# Patient Record
Sex: MALE | Race: WHITE | NOT HISPANIC OR LATINO
[De-identification: names, ages, dates, MRNs, and addresses within clinical notes are randomized per-mention and may not be internally consistent; named-entity substitution may affect disease eponyms.]

---

## 2017-01-03 ENCOUNTER — RECORD ABSTRACTING (OUTPATIENT)
Age: 47
End: 2017-01-03

## 2017-01-03 ENCOUNTER — APPOINTMENT (OUTPATIENT)
Dept: CARDIOTHORACIC SURGERY | Facility: CLINIC | Age: 47
End: 2017-01-03

## 2017-01-03 VITALS
HEART RATE: 104 BPM | OXYGEN SATURATION: 98 % | RESPIRATION RATE: 14 BRPM | DIASTOLIC BLOOD PRESSURE: 90 MMHG | TEMPERATURE: 98.1 F | BODY MASS INDEX: 27.2 KG/M2 | HEIGHT: 70 IN | SYSTOLIC BLOOD PRESSURE: 168 MMHG | WEIGHT: 190 LBS

## 2017-01-31 ENCOUNTER — APPOINTMENT (OUTPATIENT)
Dept: CARDIOTHORACIC SURGERY | Facility: CLINIC | Age: 47
End: 2017-01-31

## 2017-02-07 ENCOUNTER — APPOINTMENT (OUTPATIENT)
Dept: CARDIOTHORACIC SURGERY | Facility: CLINIC | Age: 47
End: 2017-02-07

## 2017-02-07 VITALS — WEIGHT: 230 LBS | BODY MASS INDEX: 33 KG/M2

## 2017-02-07 VITALS — BODY MASS INDEX: 33.59 KG/M2 | WEIGHT: 234.13 LBS

## 2017-02-07 VITALS
HEIGHT: 70 IN | HEART RATE: 93 BPM | DIASTOLIC BLOOD PRESSURE: 112 MMHG | RESPIRATION RATE: 15 BRPM | TEMPERATURE: 97.5 F | OXYGEN SATURATION: 100 % | SYSTOLIC BLOOD PRESSURE: 176 MMHG

## 2017-02-07 DIAGNOSIS — Z09 ENCOUNTER FOR FOLLOW-UP EXAMINATION AFTER COMPLETED TREATMENT FOR CONDITIONS OTHER THAN MALIGNANT NEOPLASM: ICD-10-CM

## 2017-02-07 RX ORDER — FUROSEMIDE 40 MG/1
40 TABLET ORAL DAILY
Qty: 30 | Refills: 5 | Status: COMPLETED | COMMUNITY
Start: 2017-01-03 | End: 2017-02-07

## 2017-02-07 RX ORDER — LOSARTAN POTASSIUM 25 MG/1
25 TABLET, FILM COATED ORAL DAILY
Refills: 0 | Status: COMPLETED | COMMUNITY
End: 2017-02-07

## 2017-04-26 ENCOUNTER — INPATIENT (INPATIENT)
Facility: HOSPITAL | Age: 47
LOS: 6 days | Discharge: HOME | DRG: 226 | End: 2017-05-03
Attending: INTERNAL MEDICINE | Admitting: INTERNAL MEDICINE
Payer: MEDICAID

## 2017-04-26 VITALS
HEART RATE: 90 BPM | RESPIRATION RATE: 20 BRPM | SYSTOLIC BLOOD PRESSURE: 177 MMHG | OXYGEN SATURATION: 98 % | TEMPERATURE: 98 F | DIASTOLIC BLOOD PRESSURE: 109 MMHG

## 2017-04-26 DIAGNOSIS — R07.9 CHEST PAIN, UNSPECIFIED: ICD-10-CM

## 2017-04-26 DIAGNOSIS — Z89.422 ACQUIRED ABSENCE OF OTHER LEFT TOE(S): Chronic | ICD-10-CM

## 2017-04-26 DIAGNOSIS — Z95.1 PRESENCE OF AORTOCORONARY BYPASS GRAFT: Chronic | ICD-10-CM

## 2017-04-26 DIAGNOSIS — N17.9 ACUTE KIDNEY FAILURE, UNSPECIFIED: ICD-10-CM

## 2017-04-26 DIAGNOSIS — E11.9 TYPE 2 DIABETES MELLITUS WITHOUT COMPLICATIONS: ICD-10-CM

## 2017-04-26 DIAGNOSIS — I50.9 HEART FAILURE, UNSPECIFIED: ICD-10-CM

## 2017-04-26 LAB
ALBUMIN SERPL ELPH-MCNC: 3.5 G/DL — SIGNIFICANT CHANGE UP (ref 3.3–5)
ALP SERPL-CCNC: 123 U/L — HIGH (ref 40–120)
ALT FLD-CCNC: 17 U/L RC — SIGNIFICANT CHANGE UP (ref 10–45)
ANION GAP SERPL CALC-SCNC: 18 MMOL/L — HIGH (ref 5–17)
APPEARANCE UR: ABNORMAL
AST SERPL-CCNC: 17 U/L — SIGNIFICANT CHANGE UP (ref 10–40)
BASOPHILS # BLD AUTO: 0 K/UL — SIGNIFICANT CHANGE UP (ref 0–0.2)
BASOPHILS NFR BLD AUTO: 0.5 % — SIGNIFICANT CHANGE UP (ref 0–2)
BILIRUB SERPL-MCNC: 0.5 MG/DL — SIGNIFICANT CHANGE UP (ref 0.2–1.2)
BILIRUB UR-MCNC: NEGATIVE — SIGNIFICANT CHANGE UP
BUN SERPL-MCNC: 94 MG/DL — HIGH (ref 7–23)
CALCIUM SERPL-MCNC: 8.9 MG/DL — SIGNIFICANT CHANGE UP (ref 8.4–10.5)
CHLORIDE SERPL-SCNC: 103 MMOL/L — SIGNIFICANT CHANGE UP (ref 96–108)
CK MB BLD-MCNC: 6.7 % — HIGH (ref 0–3.5)
CK MB CFR SERPL CALC: 6.9 NG/ML — HIGH (ref 0–6.7)
CK SERPL-CCNC: 103 U/L — SIGNIFICANT CHANGE UP (ref 30–200)
CO2 SERPL-SCNC: 18 MMOL/L — LOW (ref 22–31)
COLOR SPEC: YELLOW — SIGNIFICANT CHANGE UP
COMMENT - URINE: SIGNIFICANT CHANGE UP
CREAT SERPL-MCNC: 4.39 MG/DL — HIGH (ref 0.5–1.3)
DIFF PNL FLD: ABNORMAL
EOSINOPHIL # BLD AUTO: 0 K/UL — SIGNIFICANT CHANGE UP (ref 0–0.5)
EOSINOPHIL NFR BLD AUTO: 0.6 % — SIGNIFICANT CHANGE UP (ref 0–6)
EPI CELLS # UR: SIGNIFICANT CHANGE UP /HPF
GLUCOSE SERPL-MCNC: 88 MG/DL — SIGNIFICANT CHANGE UP (ref 70–99)
GLUCOSE UR QL: 50
HCT VFR BLD CALC: 35.8 % — LOW (ref 39–50)
HGB BLD-MCNC: 12.1 G/DL — LOW (ref 13–17)
HYALINE CASTS # UR AUTO: ABNORMAL
INR BLD: 1.21 RATIO — HIGH (ref 0.88–1.16)
KETONES UR-MCNC: NEGATIVE — SIGNIFICANT CHANGE UP
LEUKOCYTE ESTERASE UR-ACNC: ABNORMAL
LYMPHOCYTES # BLD AUTO: 1 K/UL — SIGNIFICANT CHANGE UP (ref 1–3.3)
LYMPHOCYTES # BLD AUTO: 17.2 % — SIGNIFICANT CHANGE UP (ref 13–44)
MCHC RBC-ENTMCNC: 28.3 PG — SIGNIFICANT CHANGE UP (ref 27–34)
MCHC RBC-ENTMCNC: 33.9 GM/DL — SIGNIFICANT CHANGE UP (ref 32–36)
MCV RBC AUTO: 83.5 FL — SIGNIFICANT CHANGE UP (ref 80–100)
MONOCYTES # BLD AUTO: 0.6 K/UL — SIGNIFICANT CHANGE UP (ref 0–0.9)
MONOCYTES NFR BLD AUTO: 10.8 % — SIGNIFICANT CHANGE UP (ref 2–14)
NEUTROPHILS # BLD AUTO: 4.1 K/UL — SIGNIFICANT CHANGE UP (ref 1.8–7.4)
NEUTROPHILS NFR BLD AUTO: 70.9 % — SIGNIFICANT CHANGE UP (ref 43–77)
NITRITE UR-MCNC: NEGATIVE — SIGNIFICANT CHANGE UP
NT-PROBNP SERPL-SCNC: HIGH PG/ML (ref 0–300)
PH UR: 6 — SIGNIFICANT CHANGE UP (ref 5–8)
PLATELET # BLD AUTO: 194 K/UL — SIGNIFICANT CHANGE UP (ref 150–400)
POTASSIUM SERPL-MCNC: 4.2 MMOL/L — SIGNIFICANT CHANGE UP (ref 3.5–5.3)
POTASSIUM SERPL-SCNC: 4.2 MMOL/L — SIGNIFICANT CHANGE UP (ref 3.5–5.3)
PROT SERPL-MCNC: 7.3 G/DL — SIGNIFICANT CHANGE UP (ref 6–8.3)
PROT UR-MCNC: 500 MG/DL
PROTHROM AB SERPL-ACNC: 13.2 SEC — HIGH (ref 9.8–12.7)
RBC # BLD: 4.29 M/UL — SIGNIFICANT CHANGE UP (ref 4.2–5.8)
RBC # FLD: 15.7 % — HIGH (ref 10.3–14.5)
RBC CASTS # UR COMP ASSIST: ABNORMAL /HPF (ref 0–2)
SODIUM SERPL-SCNC: 139 MMOL/L — SIGNIFICANT CHANGE UP (ref 135–145)
SP GR SPEC: 1.02 — SIGNIFICANT CHANGE UP (ref 1.01–1.02)
TROPONIN T SERPL-MCNC: 0.23 NG/ML — HIGH (ref 0–0.06)
TROPONIN T SERPL-MCNC: 0.25 NG/ML — HIGH (ref 0–0.06)
TROPONIN T SERPL-MCNC: 0.25 NG/ML — HIGH (ref 0–0.06)
UROBILINOGEN FLD QL: NEGATIVE — SIGNIFICANT CHANGE UP
WBC # BLD: 5.7 K/UL — SIGNIFICANT CHANGE UP (ref 3.8–10.5)
WBC # FLD AUTO: 5.7 K/UL — SIGNIFICANT CHANGE UP (ref 3.8–10.5)
WBC UR QL: >50 /HPF (ref 0–5)

## 2017-04-26 PROCEDURE — 99285 EMERGENCY DEPT VISIT HI MDM: CPT | Mod: 25

## 2017-04-26 PROCEDURE — 71020: CPT | Mod: 26

## 2017-04-26 PROCEDURE — 99223 1ST HOSP IP/OBS HIGH 75: CPT

## 2017-04-26 PROCEDURE — 93306 TTE W/DOPPLER COMPLETE: CPT | Mod: 26

## 2017-04-26 PROCEDURE — 93010 ELECTROCARDIOGRAM REPORT: CPT

## 2017-04-26 PROCEDURE — 99223 1ST HOSP IP/OBS HIGH 75: CPT | Mod: GC

## 2017-04-26 RX ORDER — SODIUM CHLORIDE 9 MG/ML
500 INJECTION INTRAMUSCULAR; INTRAVENOUS; SUBCUTANEOUS ONCE
Qty: 0 | Refills: 0 | Status: COMPLETED | OUTPATIENT
Start: 2017-04-26 | End: 2017-04-26

## 2017-04-26 RX ORDER — CARVEDILOL PHOSPHATE 80 MG/1
3.12 CAPSULE, EXTENDED RELEASE ORAL EVERY 12 HOURS
Qty: 0 | Refills: 0 | Status: DISCONTINUED | OUTPATIENT
Start: 2017-04-26 | End: 2017-05-03

## 2017-04-26 RX ORDER — DEXTROSE 50 % IN WATER 50 %
12.5 SYRINGE (ML) INTRAVENOUS ONCE
Qty: 0 | Refills: 0 | Status: DISCONTINUED | OUTPATIENT
Start: 2017-04-26 | End: 2017-05-03

## 2017-04-26 RX ORDER — SPIRONOLACTONE 25 MG/1
25 TABLET, FILM COATED ORAL DAILY
Qty: 0 | Refills: 0 | Status: DISCONTINUED | OUTPATIENT
Start: 2017-04-26 | End: 2017-04-27

## 2017-04-26 RX ORDER — SODIUM CHLORIDE 9 MG/ML
1000 INJECTION, SOLUTION INTRAVENOUS
Qty: 0 | Refills: 0 | Status: DISCONTINUED | OUTPATIENT
Start: 2017-04-26 | End: 2017-05-03

## 2017-04-26 RX ORDER — SODIUM CHLORIDE 9 MG/ML
3 INJECTION INTRAMUSCULAR; INTRAVENOUS; SUBCUTANEOUS ONCE
Qty: 0 | Refills: 0 | Status: COMPLETED | OUTPATIENT
Start: 2017-04-26 | End: 2017-04-26

## 2017-04-26 RX ORDER — CEFAZOLIN SODIUM 1 G
1000 VIAL (EA) INJECTION ONCE
Qty: 0 | Refills: 0 | Status: COMPLETED | OUTPATIENT
Start: 2017-04-26 | End: 2017-04-26

## 2017-04-26 RX ORDER — DEXTROSE 50 % IN WATER 50 %
25 SYRINGE (ML) INTRAVENOUS ONCE
Qty: 0 | Refills: 0 | Status: DISCONTINUED | OUTPATIENT
Start: 2017-04-26 | End: 2017-05-03

## 2017-04-26 RX ORDER — ATORVASTATIN CALCIUM 80 MG/1
40 TABLET, FILM COATED ORAL AT BEDTIME
Qty: 0 | Refills: 0 | Status: DISCONTINUED | OUTPATIENT
Start: 2017-04-26 | End: 2017-05-03

## 2017-04-26 RX ORDER — INSULIN LISPRO 100/ML
VIAL (ML) SUBCUTANEOUS AT BEDTIME
Qty: 0 | Refills: 0 | Status: DISCONTINUED | OUTPATIENT
Start: 2017-04-26 | End: 2017-04-27

## 2017-04-26 RX ORDER — CEFAZOLIN SODIUM 1 G
VIAL (EA) INJECTION
Qty: 0 | Refills: 0 | Status: DISCONTINUED | OUTPATIENT
Start: 2017-04-26 | End: 2017-04-29

## 2017-04-26 RX ORDER — ASPIRIN/CALCIUM CARB/MAGNESIUM 324 MG
81 TABLET ORAL DAILY
Qty: 0 | Refills: 0 | Status: DISCONTINUED | OUTPATIENT
Start: 2017-04-26 | End: 2017-05-03

## 2017-04-26 RX ORDER — FUROSEMIDE 40 MG
40 TABLET ORAL DAILY
Qty: 0 | Refills: 0 | Status: DISCONTINUED | OUTPATIENT
Start: 2017-04-26 | End: 2017-04-27

## 2017-04-26 RX ORDER — GLUCAGON INJECTION, SOLUTION 0.5 MG/.1ML
1 INJECTION, SOLUTION SUBCUTANEOUS ONCE
Qty: 0 | Refills: 0 | Status: DISCONTINUED | OUTPATIENT
Start: 2017-04-26 | End: 2017-05-03

## 2017-04-26 RX ORDER — HEPARIN SODIUM 5000 [USP'U]/ML
5000 INJECTION INTRAVENOUS; SUBCUTANEOUS EVERY 12 HOURS
Qty: 0 | Refills: 0 | Status: DISCONTINUED | OUTPATIENT
Start: 2017-04-26 | End: 2017-05-03

## 2017-04-26 RX ORDER — CEFAZOLIN SODIUM 1 G
1000 VIAL (EA) INJECTION EVERY 12 HOURS
Qty: 0 | Refills: 0 | Status: DISCONTINUED | OUTPATIENT
Start: 2017-04-27 | End: 2017-04-29

## 2017-04-26 RX ORDER — INSULIN LISPRO 100/ML
VIAL (ML) SUBCUTANEOUS
Qty: 0 | Refills: 0 | Status: DISCONTINUED | OUTPATIENT
Start: 2017-04-26 | End: 2017-04-27

## 2017-04-26 RX ORDER — DEXTROSE 50 % IN WATER 50 %
1 SYRINGE (ML) INTRAVENOUS ONCE
Qty: 0 | Refills: 0 | Status: DISCONTINUED | OUTPATIENT
Start: 2017-04-26 | End: 2017-05-03

## 2017-04-26 RX ADMIN — Medication 40 MILLIGRAM(S): at 21:30

## 2017-04-26 RX ADMIN — SODIUM CHLORIDE 500 MILLILITER(S): 9 INJECTION INTRAMUSCULAR; INTRAVENOUS; SUBCUTANEOUS at 14:16

## 2017-04-26 RX ADMIN — ATORVASTATIN CALCIUM 40 MILLIGRAM(S): 80 TABLET, FILM COATED ORAL at 21:31

## 2017-04-26 RX ADMIN — SODIUM CHLORIDE 3 MILLILITER(S): 9 INJECTION INTRAMUSCULAR; INTRAVENOUS; SUBCUTANEOUS at 12:35

## 2017-04-26 RX ADMIN — CARVEDILOL PHOSPHATE 3.12 MILLIGRAM(S): 80 CAPSULE, EXTENDED RELEASE ORAL at 19:04

## 2017-04-26 RX ADMIN — Medication 100 MILLIGRAM(S): at 18:46

## 2017-04-26 RX ADMIN — Medication 81 MILLIGRAM(S): at 14:15

## 2017-04-26 NOTE — ED ADULT NURSE REASSESSMENT NOTE - NS ED NURSE REASSESS COMMENT FT1
Recvd pt awake, alert and responsive to all stimuli.  no sob or respiratory distress noted at this time.  pt denies any pain at this time and is resting comfortably in bed with cardiac monitor in place in NSR.  pt was hypertensive with kh=932/118; coreg po 3.125mg administered, as per md's orders.  recheck ft=273/106.  Will continue to monitor.

## 2017-04-26 NOTE — ED PROVIDER NOTE - SKIN, MLM
Skin normal color for race, warm, dry and intact. No evidence of rash. Sternotomy scar. Open wound to the right leg.

## 2017-04-26 NOTE — ED PROVIDER NOTE - NS ED MD SCRIBE ATTENDING SCRIBE SECTIONS
PAST MEDICAL/SURGICAL/SOCIAL HISTORY/HISTORY OF PRESENT ILLNESS/PHYSICAL EXAM/PROGRESS NOTE/REVIEW OF SYSTEMS/DISPOSITION/HIV

## 2017-04-26 NOTE — H&P ADULT. - HISTORY OF PRESENT ILLNESS
46 year old male with PMHx Diabetes Type II with foot ulcers and CAD and presents to ED c/o chest pain onset yesterday. Had an EKG done today. Denies fever or abdominal pain.

## 2017-04-26 NOTE — ED PROVIDER NOTE - OBJECTIVE STATEMENT
46 year old male with PMHx Diabetes Type II with foot ulcers and CAD and presents to ED c/o chest pain onset yesterday. Had an EKG done today. Denies fever or abdominal pain.      Cardiologist- Dr. Mckeon   PCP- Dr. Alexandr Olson

## 2017-04-26 NOTE — ED ADULT NURSE NOTE - OBJECTIVE STATEMENT
46y male arrived to ED (ambulatory) complaining of chest pain and SOB. Per patient, pain began yesterday and is located in the mid-sternal region, patient went to cardiologist today and was sent to the ED for further evaluation. Patient Hx quadruple bypass (Nov 2016), HTN, DM2. Patient lower extremities appear to have weeping edema - swollen, red (began Thanksgiving 2016), wound on the right shin for 2 weeks, sensory on all extremities intact. Patient denies nausea, vomiting, and abdominal pain. 46y male arrived to ED (ambulatory) complaining of chest pain and SOB while at rest and exertion. Per patient, pain began yesterday and is located in the mid-sternal region- nonradiating, patient went to cardiologist today and was sent to the ED for further evaluation. Patient Hx "quadruple bypass" (Nov 2016), HTN, DM2. Patient lower extremities appear to have weeping edema with pitting - swollen, red (began Thanksgiving 2016), wound/ulcer with blisters on the right shin for 2 weeks, sensory on all extremities intact. Patient denies nausea, vomiting, and abdominal pain.

## 2017-04-26 NOTE — ED PROVIDER NOTE - PROGRESS NOTE DETAILS
Contacted PCP and cardiologist, admissions to [facility] Contacted Dr. Weiner for nephrology Edinson Pablo MD (attending)

## 2017-04-26 NOTE — ED PROVIDER NOTE - DETAILS:
intermittent chest pain at rest, referred to emergency department by cardiology for admission, on my exam:  clear lungs, soft abdomen, edema of legs

## 2017-04-26 NOTE — ED ADULT NURSE NOTE - CHPI ED SYMPTOMS NEG
no vomiting/no fever/no nausea/no back pain/no syncope/no cough/no dizziness/no chills/no diaphoresis

## 2017-04-27 LAB
ANION GAP SERPL CALC-SCNC: 16 MMOL/L — SIGNIFICANT CHANGE UP (ref 5–17)
BUN SERPL-MCNC: 92 MG/DL — HIGH (ref 7–23)
CALCIUM SERPL-MCNC: 8.7 MG/DL — SIGNIFICANT CHANGE UP (ref 8.4–10.5)
CHLORIDE SERPL-SCNC: 105 MMOL/L — SIGNIFICANT CHANGE UP (ref 96–108)
CO2 SERPL-SCNC: 17 MMOL/L — LOW (ref 22–31)
CREAT SERPL-MCNC: 4.32 MG/DL — HIGH (ref 0.5–1.3)
GLUCOSE SERPL-MCNC: 62 MG/DL — LOW (ref 70–99)
HBA1C BLD-MCNC: 6.4 % — HIGH (ref 4–5.6)
HCT VFR BLD CALC: 30.4 % — LOW (ref 39–50)
HGB BLD-MCNC: 10.3 G/DL — LOW (ref 13–17)
MAGNESIUM SERPL-MCNC: 2.1 MG/DL — SIGNIFICANT CHANGE UP (ref 1.6–2.6)
MCHC RBC-ENTMCNC: 27.3 PG — SIGNIFICANT CHANGE UP (ref 27–34)
MCHC RBC-ENTMCNC: 33.9 GM/DL — SIGNIFICANT CHANGE UP (ref 32–36)
MCV RBC AUTO: 80.6 FL — SIGNIFICANT CHANGE UP (ref 80–100)
PHOSPHATE SERPL-MCNC: 4.6 MG/DL — HIGH (ref 2.5–4.5)
PLATELET # BLD AUTO: 184 K/UL — SIGNIFICANT CHANGE UP (ref 150–400)
POTASSIUM SERPL-MCNC: 4 MMOL/L — SIGNIFICANT CHANGE UP (ref 3.5–5.3)
POTASSIUM SERPL-SCNC: 4 MMOL/L — SIGNIFICANT CHANGE UP (ref 3.5–5.3)
RBC # BLD: 3.77 M/UL — LOW (ref 4.2–5.8)
RBC # FLD: 16.4 % — HIGH (ref 10.3–14.5)
SODIUM SERPL-SCNC: 138 MMOL/L — SIGNIFICANT CHANGE UP (ref 135–145)
TROPONIN T SERPL-MCNC: 0.24 NG/ML — HIGH (ref 0–0.06)
WBC # BLD: 4.34 K/UL — SIGNIFICANT CHANGE UP (ref 3.8–10.5)
WBC # FLD AUTO: 4.34 K/UL — SIGNIFICANT CHANGE UP (ref 3.8–10.5)

## 2017-04-27 PROCEDURE — 99232 SBSQ HOSP IP/OBS MODERATE 35: CPT

## 2017-04-27 PROCEDURE — 99233 SBSQ HOSP IP/OBS HIGH 50: CPT | Mod: GC

## 2017-04-27 RX ORDER — INSULIN LISPRO 100/ML
VIAL (ML) SUBCUTANEOUS
Qty: 0 | Refills: 0 | Status: DISCONTINUED | OUTPATIENT
Start: 2017-04-27 | End: 2017-04-28

## 2017-04-27 RX ORDER — HYDRALAZINE HCL 50 MG
25 TABLET ORAL ONCE
Qty: 0 | Refills: 0 | Status: COMPLETED | OUTPATIENT
Start: 2017-04-27 | End: 2017-04-27

## 2017-04-27 RX ORDER — INSULIN LISPRO 100/ML
VIAL (ML) SUBCUTANEOUS
Qty: 0 | Refills: 0 | Status: DISCONTINUED | OUTPATIENT
Start: 2017-04-27 | End: 2017-04-27

## 2017-04-27 RX ORDER — HYDRALAZINE HCL 50 MG
25 TABLET ORAL THREE TIMES A DAY
Qty: 0 | Refills: 0 | Status: DISCONTINUED | OUTPATIENT
Start: 2017-04-27 | End: 2017-04-28

## 2017-04-27 RX ORDER — ISOSORBIDE DINITRATE 5 MG/1
10 TABLET ORAL THREE TIMES A DAY
Qty: 0 | Refills: 0 | Status: DISCONTINUED | OUTPATIENT
Start: 2017-04-27 | End: 2017-05-03

## 2017-04-27 RX ORDER — FUROSEMIDE 40 MG
80 TABLET ORAL EVERY 12 HOURS
Qty: 0 | Refills: 0 | Status: DISCONTINUED | OUTPATIENT
Start: 2017-04-27 | End: 2017-05-01

## 2017-04-27 RX ORDER — INSULIN LISPRO 100/ML
VIAL (ML) SUBCUTANEOUS AT BEDTIME
Qty: 0 | Refills: 0 | Status: DISCONTINUED | OUTPATIENT
Start: 2017-04-27 | End: 2017-04-28

## 2017-04-27 RX ADMIN — SPIRONOLACTONE 25 MILLIGRAM(S): 25 TABLET, FILM COATED ORAL at 05:34

## 2017-04-27 RX ADMIN — Medication 25 MILLIGRAM(S): at 00:42

## 2017-04-27 RX ADMIN — Medication 80 MILLIGRAM(S): at 18:41

## 2017-04-27 RX ADMIN — Medication 200 MILLIGRAM(S): at 18:42

## 2017-04-27 RX ADMIN — Medication 6: at 12:04

## 2017-04-27 RX ADMIN — ISOSORBIDE DINITRATE 10 MILLIGRAM(S): 5 TABLET ORAL at 22:16

## 2017-04-27 RX ADMIN — HEPARIN SODIUM 5000 UNIT(S): 5000 INJECTION INTRAVENOUS; SUBCUTANEOUS at 17:38

## 2017-04-27 RX ADMIN — Medication 25 MILLIGRAM(S): at 21:06

## 2017-04-27 RX ADMIN — Medication 100 MILLIGRAM(S): at 17:37

## 2017-04-27 RX ADMIN — Medication 81 MILLIGRAM(S): at 12:05

## 2017-04-27 RX ADMIN — CARVEDILOL PHOSPHATE 3.12 MILLIGRAM(S): 80 CAPSULE, EXTENDED RELEASE ORAL at 05:34

## 2017-04-27 RX ADMIN — ATORVASTATIN CALCIUM 40 MILLIGRAM(S): 80 TABLET, FILM COATED ORAL at 21:06

## 2017-04-27 RX ADMIN — HEPARIN SODIUM 5000 UNIT(S): 5000 INJECTION INTRAVENOUS; SUBCUTANEOUS at 05:35

## 2017-04-27 RX ADMIN — Medication 40 MILLIGRAM(S): at 05:35

## 2017-04-27 RX ADMIN — Medication 100 MILLIGRAM(S): at 05:35

## 2017-04-27 RX ADMIN — CARVEDILOL PHOSPHATE 3.12 MILLIGRAM(S): 80 CAPSULE, EXTENDED RELEASE ORAL at 17:38

## 2017-04-27 NOTE — PROVIDER CONTACT NOTE (OTHER) - ACTION/TREATMENT ORDERED:
PA made aware. Pt to assess pt at the bedside. Lasix 40mg IV push given. Repeat BP was 167/111, hydralazine 25mg given. Will continue to monitor, and reassess BP.

## 2017-04-27 NOTE — PROVIDER CONTACT NOTE (OTHER) - ACTION/TREATMENT ORDERED:
give carbs/juice repeat FS--FS after 2 portions of carbs/juice and dinner now 106, hold sliding as per parameters

## 2017-04-27 NOTE — PROVIDER CONTACT NOTE (OTHER) - ACTION/TREATMENT ORDERED:
PA made aware. Hypoglycemic protocol initiated, repeat FS will be done every 15 minutes until FS is 100 or greater

## 2017-04-28 LAB
ANION GAP SERPL CALC-SCNC: 18 MMOL/L — HIGH (ref 5–17)
BUN SERPL-MCNC: 88 MG/DL — HIGH (ref 7–23)
CALCIUM SERPL-MCNC: 8.6 MG/DL — SIGNIFICANT CHANGE UP (ref 8.4–10.5)
CHLORIDE SERPL-SCNC: 101 MMOL/L — SIGNIFICANT CHANGE UP (ref 96–108)
CO2 SERPL-SCNC: 18 MMOL/L — LOW (ref 22–31)
CREAT SERPL-MCNC: 4.56 MG/DL — HIGH (ref 0.5–1.3)
GLUCOSE SERPL-MCNC: 93 MG/DL — SIGNIFICANT CHANGE UP (ref 70–99)
HCT VFR BLD CALC: 31 % — LOW (ref 39–50)
HGB BLD-MCNC: 10.6 G/DL — LOW (ref 13–17)
MCHC RBC-ENTMCNC: 27.4 PG — SIGNIFICANT CHANGE UP (ref 27–34)
MCHC RBC-ENTMCNC: 34.2 GM/DL — SIGNIFICANT CHANGE UP (ref 32–36)
MCV RBC AUTO: 80.1 FL — SIGNIFICANT CHANGE UP (ref 80–100)
PLATELET # BLD AUTO: 193 K/UL — SIGNIFICANT CHANGE UP (ref 150–400)
POTASSIUM SERPL-MCNC: 4 MMOL/L — SIGNIFICANT CHANGE UP (ref 3.5–5.3)
POTASSIUM SERPL-SCNC: 4 MMOL/L — SIGNIFICANT CHANGE UP (ref 3.5–5.3)
RBC # BLD: 3.87 M/UL — LOW (ref 4.2–5.8)
RBC # FLD: 16.4 % — HIGH (ref 10.3–14.5)
SODIUM SERPL-SCNC: 137 MMOL/L — SIGNIFICANT CHANGE UP (ref 135–145)
WBC # BLD: 4.53 K/UL — SIGNIFICANT CHANGE UP (ref 3.8–10.5)
WBC # FLD AUTO: 4.53 K/UL — SIGNIFICANT CHANGE UP (ref 3.8–10.5)

## 2017-04-28 PROCEDURE — 99233 SBSQ HOSP IP/OBS HIGH 50: CPT | Mod: GC

## 2017-04-28 PROCEDURE — 99223 1ST HOSP IP/OBS HIGH 75: CPT

## 2017-04-28 RX ORDER — HYDRALAZINE HCL 50 MG
50 TABLET ORAL THREE TIMES A DAY
Qty: 0 | Refills: 0 | Status: DISCONTINUED | OUTPATIENT
Start: 2017-04-28 | End: 2017-04-29

## 2017-04-28 RX ORDER — AMLODIPINE BESYLATE 2.5 MG/1
5 TABLET ORAL DAILY
Qty: 0 | Refills: 0 | Status: DISCONTINUED | OUTPATIENT
Start: 2017-04-28 | End: 2017-04-30

## 2017-04-28 RX ORDER — INSULIN LISPRO 100/ML
VIAL (ML) SUBCUTANEOUS
Qty: 0 | Refills: 0 | Status: DISCONTINUED | OUTPATIENT
Start: 2017-04-28 | End: 2017-05-03

## 2017-04-28 RX ADMIN — ISOSORBIDE DINITRATE 10 MILLIGRAM(S): 5 TABLET ORAL at 21:02

## 2017-04-28 RX ADMIN — Medication 25 MILLIGRAM(S): at 14:53

## 2017-04-28 RX ADMIN — AMLODIPINE BESYLATE 5 MILLIGRAM(S): 2.5 TABLET ORAL at 12:19

## 2017-04-28 RX ADMIN — Medication 81 MILLIGRAM(S): at 12:19

## 2017-04-28 RX ADMIN — HEPARIN SODIUM 5000 UNIT(S): 5000 INJECTION INTRAVENOUS; SUBCUTANEOUS at 17:18

## 2017-04-28 RX ADMIN — Medication 25 MILLIGRAM(S): at 06:02

## 2017-04-28 RX ADMIN — HEPARIN SODIUM 5000 UNIT(S): 5000 INJECTION INTRAVENOUS; SUBCUTANEOUS at 06:05

## 2017-04-28 RX ADMIN — CARVEDILOL PHOSPHATE 3.12 MILLIGRAM(S): 80 CAPSULE, EXTENDED RELEASE ORAL at 17:18

## 2017-04-28 RX ADMIN — Medication 50 MILLIGRAM(S): at 21:02

## 2017-04-28 RX ADMIN — ISOSORBIDE DINITRATE 10 MILLIGRAM(S): 5 TABLET ORAL at 06:02

## 2017-04-28 RX ADMIN — Medication 100 MILLIGRAM(S): at 06:02

## 2017-04-28 RX ADMIN — ISOSORBIDE DINITRATE 10 MILLIGRAM(S): 5 TABLET ORAL at 14:53

## 2017-04-28 RX ADMIN — CARVEDILOL PHOSPHATE 3.12 MILLIGRAM(S): 80 CAPSULE, EXTENDED RELEASE ORAL at 06:02

## 2017-04-28 RX ADMIN — Medication 100 MILLIGRAM(S): at 17:18

## 2017-04-28 RX ADMIN — Medication 80 MILLIGRAM(S): at 17:18

## 2017-04-28 RX ADMIN — Medication 80 MILLIGRAM(S): at 06:02

## 2017-04-28 RX ADMIN — ATORVASTATIN CALCIUM 40 MILLIGRAM(S): 80 TABLET, FILM COATED ORAL at 21:01

## 2017-04-28 NOTE — ADVANCED PRACTICE NURSE CONSULT - REASON FOR CONSULT
Requested by nursing staff to assess right shin for treatment options.  Patient is a 46 year old male with PMHx Diabetes Type II with foot ulcers and CAD and presents to ED c/o chest pain onset yesterday. Had an EKG done today. Denies fever or abdominal pain.    Past Medical History:  Coronary artery disease    Diabetes mellitus  type 2  Foot ulcer due to secondary DM.    Past Surgical History:  Breast Reduction  at age 18  S/P CABG (coronary artery bypass graft)    Toe amputation status, left.  Patient is currently on 4 Dale.

## 2017-04-28 NOTE — ADVANCED PRACTICE NURSE CONSULT - ASSESSMENT
Patient is on a [employer] P 500 low airloss surface and is able to turn and position.    Patient has been seen by Infectious Disease and Cardiology .  As per ID notes wounds appear to be improving with current treatment of antibiotics.  On assessment patient has four areas of ruptured bullae that have developed a crusting over the wound bed.  One wound has a small open wound bed that appears to be clean and viable.  No odor is noted with any of the wounds.  Will recommend covering wounds with Adaptic to provide a nonadherent interface between the wounds and the gauze dressing.  Secured with a julius wrap.  Small amount of plaque areas noted on lower right and left legs.  Discussed gentle exfoliation at home each day with patient to keep bacteria levels down.

## 2017-04-28 NOTE — ADVANCED PRACTICE NURSE CONSULT - RECOMMEDATIONS
1.  Adaptic non adherent silicone sheet to blisters on right shin and cover with ABD pads and julius wrap.  May change daily or every other day and prn  Discussed with patient gentle exfoliation each day remove plaques that are forming on his lower extremities.    Remain available as requested by staff

## 2017-04-29 LAB
HCT VFR BLD CALC: 32.1 % — LOW (ref 39–50)
HGB BLD-MCNC: 10.8 G/DL — LOW (ref 13–17)
MCHC RBC-ENTMCNC: 26.9 PG — LOW (ref 27–34)
MCHC RBC-ENTMCNC: 33.6 GM/DL — SIGNIFICANT CHANGE UP (ref 32–36)
MCV RBC AUTO: 79.9 FL — LOW (ref 80–100)
PLATELET # BLD AUTO: 210 K/UL — SIGNIFICANT CHANGE UP (ref 150–400)
RBC # BLD: 4.02 M/UL — LOW (ref 4.2–5.8)
RBC # FLD: 16.1 % — HIGH (ref 10.3–14.5)
WBC # BLD: 5.26 K/UL — SIGNIFICANT CHANGE UP (ref 3.8–10.5)
WBC # FLD AUTO: 5.26 K/UL — SIGNIFICANT CHANGE UP (ref 3.8–10.5)

## 2017-04-29 PROCEDURE — 99233 SBSQ HOSP IP/OBS HIGH 50: CPT | Mod: GC

## 2017-04-29 RX ORDER — INSULIN LISPRO 100/ML
VIAL (ML) SUBCUTANEOUS AT BEDTIME
Qty: 0 | Refills: 0 | Status: DISCONTINUED | OUTPATIENT
Start: 2017-04-29 | End: 2017-05-03

## 2017-04-29 RX ORDER — HYDRALAZINE HCL 50 MG
75 TABLET ORAL THREE TIMES A DAY
Qty: 0 | Refills: 0 | Status: DISCONTINUED | OUTPATIENT
Start: 2017-04-29 | End: 2017-05-03

## 2017-04-29 RX ORDER — CEPHALEXIN 500 MG
250 CAPSULE ORAL EVERY 12 HOURS
Qty: 0 | Refills: 0 | Status: DISCONTINUED | OUTPATIENT
Start: 2017-04-29 | End: 2017-05-03

## 2017-04-29 RX ADMIN — ISOSORBIDE DINITRATE 10 MILLIGRAM(S): 5 TABLET ORAL at 22:33

## 2017-04-29 RX ADMIN — Medication 50 MILLIGRAM(S): at 05:01

## 2017-04-29 RX ADMIN — Medication 80 MILLIGRAM(S): at 18:50

## 2017-04-29 RX ADMIN — Medication 80 MILLIGRAM(S): at 05:00

## 2017-04-29 RX ADMIN — ATORVASTATIN CALCIUM 40 MILLIGRAM(S): 80 TABLET, FILM COATED ORAL at 22:33

## 2017-04-29 RX ADMIN — ISOSORBIDE DINITRATE 10 MILLIGRAM(S): 5 TABLET ORAL at 14:41

## 2017-04-29 RX ADMIN — HEPARIN SODIUM 5000 UNIT(S): 5000 INJECTION INTRAVENOUS; SUBCUTANEOUS at 18:50

## 2017-04-29 RX ADMIN — Medication 100 MILLIGRAM(S): at 05:09

## 2017-04-29 RX ADMIN — CARVEDILOL PHOSPHATE 3.12 MILLIGRAM(S): 80 CAPSULE, EXTENDED RELEASE ORAL at 05:01

## 2017-04-29 RX ADMIN — ISOSORBIDE DINITRATE 10 MILLIGRAM(S): 5 TABLET ORAL at 05:01

## 2017-04-29 RX ADMIN — AMLODIPINE BESYLATE 5 MILLIGRAM(S): 2.5 TABLET ORAL at 05:09

## 2017-04-29 RX ADMIN — Medication 81 MILLIGRAM(S): at 12:13

## 2017-04-29 RX ADMIN — Medication 1: at 12:13

## 2017-04-29 RX ADMIN — CARVEDILOL PHOSPHATE 3.12 MILLIGRAM(S): 80 CAPSULE, EXTENDED RELEASE ORAL at 18:50

## 2017-04-29 RX ADMIN — HEPARIN SODIUM 5000 UNIT(S): 5000 INJECTION INTRAVENOUS; SUBCUTANEOUS at 05:09

## 2017-04-29 RX ADMIN — Medication 75 MILLIGRAM(S): at 22:33

## 2017-04-29 RX ADMIN — Medication 75 MILLIGRAM(S): at 14:41

## 2017-04-29 RX ADMIN — Medication 250 MILLIGRAM(S): at 18:50

## 2017-04-30 LAB
HCT VFR BLD CALC: 29.5 % — LOW (ref 39–50)
HGB BLD-MCNC: 10.1 G/DL — LOW (ref 13–17)
MCHC RBC-ENTMCNC: 27 PG — SIGNIFICANT CHANGE UP (ref 27–34)
MCHC RBC-ENTMCNC: 34.2 GM/DL — SIGNIFICANT CHANGE UP (ref 32–36)
MCV RBC AUTO: 78.9 FL — LOW (ref 80–100)
PLATELET # BLD AUTO: 200 K/UL — SIGNIFICANT CHANGE UP (ref 150–400)
RBC # BLD: 3.74 M/UL — LOW (ref 4.2–5.8)
RBC # FLD: 15.9 % — HIGH (ref 10.3–14.5)
WBC # BLD: 5.33 K/UL — SIGNIFICANT CHANGE UP (ref 3.8–10.5)
WBC # FLD AUTO: 5.33 K/UL — SIGNIFICANT CHANGE UP (ref 3.8–10.5)

## 2017-04-30 PROCEDURE — 99233 SBSQ HOSP IP/OBS HIGH 50: CPT | Mod: GC

## 2017-04-30 RX ORDER — AMLODIPINE BESYLATE 2.5 MG/1
10 TABLET ORAL DAILY
Qty: 0 | Refills: 0 | Status: DISCONTINUED | OUTPATIENT
Start: 2017-04-30 | End: 2017-05-03

## 2017-04-30 RX ADMIN — ISOSORBIDE DINITRATE 10 MILLIGRAM(S): 5 TABLET ORAL at 14:20

## 2017-04-30 RX ADMIN — CARVEDILOL PHOSPHATE 3.12 MILLIGRAM(S): 80 CAPSULE, EXTENDED RELEASE ORAL at 06:05

## 2017-04-30 RX ADMIN — ISOSORBIDE DINITRATE 10 MILLIGRAM(S): 5 TABLET ORAL at 21:18

## 2017-04-30 RX ADMIN — ISOSORBIDE DINITRATE 10 MILLIGRAM(S): 5 TABLET ORAL at 06:06

## 2017-04-30 RX ADMIN — HEPARIN SODIUM 5000 UNIT(S): 5000 INJECTION INTRAVENOUS; SUBCUTANEOUS at 17:04

## 2017-04-30 RX ADMIN — Medication 80 MILLIGRAM(S): at 06:05

## 2017-04-30 RX ADMIN — Medication 75 MILLIGRAM(S): at 21:18

## 2017-04-30 RX ADMIN — Medication 1: at 17:03

## 2017-04-30 RX ADMIN — Medication 75 MILLIGRAM(S): at 06:05

## 2017-04-30 RX ADMIN — AMLODIPINE BESYLATE 10 MILLIGRAM(S): 2.5 TABLET ORAL at 12:04

## 2017-04-30 RX ADMIN — Medication 80 MILLIGRAM(S): at 17:04

## 2017-04-30 RX ADMIN — Medication 250 MILLIGRAM(S): at 06:05

## 2017-04-30 RX ADMIN — Medication 75 MILLIGRAM(S): at 14:20

## 2017-04-30 RX ADMIN — Medication 81 MILLIGRAM(S): at 12:04

## 2017-04-30 RX ADMIN — HEPARIN SODIUM 5000 UNIT(S): 5000 INJECTION INTRAVENOUS; SUBCUTANEOUS at 06:06

## 2017-04-30 RX ADMIN — CARVEDILOL PHOSPHATE 3.12 MILLIGRAM(S): 80 CAPSULE, EXTENDED RELEASE ORAL at 17:04

## 2017-04-30 RX ADMIN — Medication 250 MILLIGRAM(S): at 17:03

## 2017-04-30 RX ADMIN — ATORVASTATIN CALCIUM 40 MILLIGRAM(S): 80 TABLET, FILM COATED ORAL at 21:18

## 2017-05-01 ENCOUNTER — OUTPATIENT (OUTPATIENT)
Dept: OUTPATIENT SERVICES | Facility: HOSPITAL | Age: 47
LOS: 1 days | End: 2017-05-01
Payer: MEDICAID

## 2017-05-01 DIAGNOSIS — Z89.422 ACQUIRED ABSENCE OF OTHER LEFT TOE(S): Chronic | ICD-10-CM

## 2017-05-01 DIAGNOSIS — Z95.1 PRESENCE OF AORTOCORONARY BYPASS GRAFT: Chronic | ICD-10-CM

## 2017-05-01 LAB
CULTURE RESULTS: SIGNIFICANT CHANGE UP
CULTURE RESULTS: SIGNIFICANT CHANGE UP
SPECIMEN SOURCE: SIGNIFICANT CHANGE UP
SPECIMEN SOURCE: SIGNIFICANT CHANGE UP

## 2017-05-01 PROCEDURE — 99232 SBSQ HOSP IP/OBS MODERATE 35: CPT

## 2017-05-01 PROCEDURE — 99233 SBSQ HOSP IP/OBS HIGH 50: CPT | Mod: GC

## 2017-05-01 RX ORDER — OXYMETAZOLINE HYDROCHLORIDE 0.5 MG/ML
1 SPRAY NASAL EVERY 12 HOURS
Qty: 0 | Refills: 0 | Status: DISCONTINUED | OUTPATIENT
Start: 2017-05-01 | End: 2017-05-01

## 2017-05-01 RX ORDER — OXYMETAZOLINE HYDROCHLORIDE 0.5 MG/ML
1 SPRAY NASAL EVERY 12 HOURS
Qty: 0 | Refills: 0 | Status: DISCONTINUED | OUTPATIENT
Start: 2017-05-01 | End: 2017-05-03

## 2017-05-01 RX ORDER — OXYMETAZOLINE HYDROCHLORIDE 0.5 MG/ML
1 SPRAY NASAL EVERY 6 HOURS
Qty: 0 | Refills: 0 | Status: DISCONTINUED | OUTPATIENT
Start: 2017-05-01 | End: 2017-05-01

## 2017-05-01 RX ADMIN — CARVEDILOL PHOSPHATE 3.12 MILLIGRAM(S): 80 CAPSULE, EXTENDED RELEASE ORAL at 18:16

## 2017-05-01 RX ADMIN — Medication 75 MILLIGRAM(S): at 13:46

## 2017-05-01 RX ADMIN — ATORVASTATIN CALCIUM 40 MILLIGRAM(S): 80 TABLET, FILM COATED ORAL at 21:33

## 2017-05-01 RX ADMIN — CARVEDILOL PHOSPHATE 3.12 MILLIGRAM(S): 80 CAPSULE, EXTENDED RELEASE ORAL at 06:01

## 2017-05-01 RX ADMIN — Medication 80 MILLIGRAM(S): at 06:01

## 2017-05-01 RX ADMIN — Medication 250 MILLIGRAM(S): at 18:16

## 2017-05-01 RX ADMIN — Medication: at 13:38

## 2017-05-01 RX ADMIN — ISOSORBIDE DINITRATE 10 MILLIGRAM(S): 5 TABLET ORAL at 06:01

## 2017-05-01 RX ADMIN — Medication 81 MILLIGRAM(S): at 13:46

## 2017-05-01 RX ADMIN — ISOSORBIDE DINITRATE 10 MILLIGRAM(S): 5 TABLET ORAL at 21:33

## 2017-05-01 RX ADMIN — HEPARIN SODIUM 5000 UNIT(S): 5000 INJECTION INTRAVENOUS; SUBCUTANEOUS at 18:16

## 2017-05-01 RX ADMIN — Medication 250 MILLIGRAM(S): at 06:01

## 2017-05-01 RX ADMIN — Medication 75 MILLIGRAM(S): at 21:33

## 2017-05-01 RX ADMIN — Medication 75 MILLIGRAM(S): at 06:01

## 2017-05-01 RX ADMIN — AMLODIPINE BESYLATE 10 MILLIGRAM(S): 2.5 TABLET ORAL at 06:01

## 2017-05-01 RX ADMIN — ISOSORBIDE DINITRATE 10 MILLIGRAM(S): 5 TABLET ORAL at 18:15

## 2017-05-02 LAB
ANION GAP SERPL CALC-SCNC: 20 MMOL/L — HIGH (ref 5–17)
BUN SERPL-MCNC: 101 MG/DL — HIGH (ref 7–23)
CALCIUM SERPL-MCNC: 8.6 MG/DL — SIGNIFICANT CHANGE UP (ref 8.4–10.5)
CHLORIDE SERPL-SCNC: 96 MMOL/L — SIGNIFICANT CHANGE UP (ref 96–108)
CHLORIDE UR-SCNC: 28 MMOL/L — SIGNIFICANT CHANGE UP
CO2 SERPL-SCNC: 20 MMOL/L — LOW (ref 22–31)
CREAT SERPL-MCNC: 5.39 MG/DL — HIGH (ref 0.5–1.3)
GLUCOSE SERPL-MCNC: 141 MG/DL — HIGH (ref 70–99)
HCT VFR BLD CALC: 30.7 % — LOW (ref 39–50)
HCT VFR BLD CALC: 34.7 % — LOW (ref 39–50)
HGB BLD-MCNC: 10.2 G/DL — LOW (ref 13–17)
HGB BLD-MCNC: 11.6 G/DL — LOW (ref 13–17)
MCHC RBC-ENTMCNC: 26.8 PG — LOW (ref 27–34)
MCHC RBC-ENTMCNC: 28 PG — SIGNIFICANT CHANGE UP (ref 27–34)
MCHC RBC-ENTMCNC: 33.2 GM/DL — SIGNIFICANT CHANGE UP (ref 32–36)
MCHC RBC-ENTMCNC: 33.3 GM/DL — SIGNIFICANT CHANGE UP (ref 32–36)
MCV RBC AUTO: 80.8 FL — SIGNIFICANT CHANGE UP (ref 80–100)
MCV RBC AUTO: 84.3 FL — SIGNIFICANT CHANGE UP (ref 80–100)
PLATELET # BLD AUTO: 177 K/UL — SIGNIFICANT CHANGE UP (ref 150–400)
PLATELET # BLD AUTO: 190 K/UL — SIGNIFICANT CHANGE UP (ref 150–400)
POTASSIUM SERPL-MCNC: 3.4 MMOL/L — LOW (ref 3.5–5.3)
POTASSIUM SERPL-SCNC: 3.4 MMOL/L — LOW (ref 3.5–5.3)
RBC # BLD: 3.8 M/UL — LOW (ref 4.2–5.8)
RBC # BLD: 4.12 M/UL — LOW (ref 4.2–5.8)
RBC # FLD: 15.6 % — HIGH (ref 10.3–14.5)
RBC # FLD: 16.4 % — HIGH (ref 10.3–14.5)
SODIUM SERPL-SCNC: 136 MMOL/L — SIGNIFICANT CHANGE UP (ref 135–145)
SODIUM UR-SCNC: 29 MMOL/L — SIGNIFICANT CHANGE UP
WBC # BLD: 7.23 K/UL — SIGNIFICANT CHANGE UP (ref 3.8–10.5)
WBC # BLD: 8.6 K/UL — SIGNIFICANT CHANGE UP (ref 3.8–10.5)
WBC # FLD AUTO: 7.23 K/UL — SIGNIFICANT CHANGE UP (ref 3.8–10.5)
WBC # FLD AUTO: 8.6 K/UL — SIGNIFICANT CHANGE UP (ref 3.8–10.5)

## 2017-05-02 PROCEDURE — 99233 SBSQ HOSP IP/OBS HIGH 50: CPT | Mod: GC

## 2017-05-02 PROCEDURE — 33249 INSJ/RPLCMT DEFIB W/LEAD(S): CPT

## 2017-05-02 PROCEDURE — 99152 MOD SED SAME PHYS/QHP 5/>YRS: CPT

## 2017-05-02 PROCEDURE — 71010: CPT | Mod: 26

## 2017-05-02 RX ORDER — ACETAMINOPHEN 500 MG
650 TABLET ORAL EVERY 6 HOURS
Qty: 0 | Refills: 0 | Status: DISCONTINUED | OUTPATIENT
Start: 2017-05-02 | End: 2017-05-03

## 2017-05-02 RX ORDER — POTASSIUM CHLORIDE 20 MEQ
20 PACKET (EA) ORAL ONCE
Qty: 0 | Refills: 0 | Status: COMPLETED | OUTPATIENT
Start: 2017-05-02 | End: 2017-05-02

## 2017-05-02 RX ORDER — VANCOMYCIN HCL 1 G
1000 VIAL (EA) INTRAVENOUS ONCE
Qty: 0 | Refills: 0 | Status: COMPLETED | OUTPATIENT
Start: 2017-05-03 | End: 2017-05-03

## 2017-05-02 RX ADMIN — ISOSORBIDE DINITRATE 10 MILLIGRAM(S): 5 TABLET ORAL at 21:50

## 2017-05-02 RX ADMIN — Medication 75 MILLIGRAM(S): at 21:50

## 2017-05-02 RX ADMIN — Medication 250 MILLIGRAM(S): at 17:09

## 2017-05-02 RX ADMIN — ATORVASTATIN CALCIUM 40 MILLIGRAM(S): 80 TABLET, FILM COATED ORAL at 21:50

## 2017-05-02 RX ADMIN — Medication 250 MILLIGRAM(S): at 05:32

## 2017-05-02 RX ADMIN — Medication 20 MILLIEQUIVALENT(S): at 09:11

## 2017-05-02 RX ADMIN — Medication 81 MILLIGRAM(S): at 17:10

## 2017-05-02 RX ADMIN — Medication 650 MILLIGRAM(S): at 23:30

## 2017-05-02 RX ADMIN — Medication 75 MILLIGRAM(S): at 05:32

## 2017-05-02 RX ADMIN — ISOSORBIDE DINITRATE 10 MILLIGRAM(S): 5 TABLET ORAL at 05:32

## 2017-05-02 RX ADMIN — AMLODIPINE BESYLATE 10 MILLIGRAM(S): 2.5 TABLET ORAL at 05:32

## 2017-05-02 RX ADMIN — CARVEDILOL PHOSPHATE 3.12 MILLIGRAM(S): 80 CAPSULE, EXTENDED RELEASE ORAL at 17:09

## 2017-05-02 RX ADMIN — CARVEDILOL PHOSPHATE 3.12 MILLIGRAM(S): 80 CAPSULE, EXTENDED RELEASE ORAL at 05:32

## 2017-05-02 RX ADMIN — Medication 1: at 17:10

## 2017-05-02 RX ADMIN — Medication 650 MILLIGRAM(S): at 22:48

## 2017-05-02 RX ADMIN — HEPARIN SODIUM 5000 UNIT(S): 5000 INJECTION INTRAVENOUS; SUBCUTANEOUS at 17:10

## 2017-05-02 NOTE — DIETITIAN INITIAL EVALUATION ADULT. - NS AS NUTRI INTERV MEALS SNACK
When medically feasible, would recommend to change diet to Consistent CHO and DASH diet, 60 gm protein diet. Trend renal indices./General/healthful diet

## 2017-05-02 NOTE — DIETITIAN INITIAL EVALUATION ADULT. - ENERGY NEEDS
Ht:5'10",  Wt: 204lbs, BMI: 29.2kg/m2, IBW: 166lbs(+/-10%), 122%IBW  Pertinent information: Pt admitted for Chest pain. Pt with known CAD, CABG history, T2MD and HF. Per Chart with Right lower extremity cellulitis, acute on chronic HF with pulmonary edema and volume overload. Plan for AICD today.   +2 callie leg Edema, Right foot ulcer.

## 2017-05-02 NOTE — DIETITIAN INITIAL EVALUATION ADULT. - ORAL INTAKE PTA
Pt reports a good PO intake PTA. Breakfast: large coffee, and sometimes eggs. Lunch and Dinner and both some variations of protein and vegetables. Pt drinks water and seltzer./good

## 2017-05-02 NOTE — DIETITIAN INITIAL EVALUATION ADULT. - NS AS NUTRI INTERV ED CONTENT
einforced T2DM and HF nutrition therapy diet guidelines. Encouraged Pt to continue monitoring BG levels and preform daily Wt's. RD encouraged Pt to continue diet compliance. RD remains available to monitor PO intake, wt, labs and diet education review./Recommended modifications/Other (specify)/Nutrition relationship to health/disease/Purpose of the nutrition education

## 2017-05-02 NOTE — DIETITIAN INITIAL EVALUATION ADULT. - NS AS NUTRI INTERV FEED ASSISTANCE
1. Provide food preferences as requested by Pt/family within diet restrictions  2. Encourage PO intake during meal times./Other (specify)

## 2017-05-02 NOTE — DIETITIAN INITIAL EVALUATION ADULT. - OTHER INFO
Pt arcelia for LOS. Pt reports a good PO intake during admission, has been consuming 100% of all meals. Pt states he monitors BG levels 2-3x daily with usual results between 80-120mg/d and takes Januvia for BG control. Current HgbA1c is 6.4%. Pt states he monitoring his Wt weekly, not daily as previously recommended to him. RD reviewed HF nutrition therapy diet and daily Wt guidelines. Pt verbalized understanding and denies need for written education materials. Pt denies micronutrient supplementation. Pt denies chewing/swallowing difficulty. Pt denies GI distress. NKFA

## 2017-05-02 NOTE — DIETITIAN INITIAL EVALUATION ADULT. - SOURCE
patient/other (specify)/family/significant other/Pt's mother at bedside, RN, medical record, Previous RD note.

## 2017-05-02 NOTE — DIETITIAN INITIAL EVALUATION ADULT. - DIET TYPE
consistent carbohydrate (no snacks)/DASH/TLC (sodium and cholesterol restricted diet)/NPO after midnight

## 2017-05-02 NOTE — PROVIDER CONTACT NOTE (OTHER) - RECOMMENDATIONS
Follow hypoglycemic protocol.
Pt woken up and assessed.
cookies provided, repeat , hold sliding humalog as per parameters continue to monitor for s/s of hypoglycemia
give carbs/juice repeat FS--FS after 2 portions of carbs/juice and dinner now 106, hold sliding as per parameters
notify np
will draw stat cbc as ordered and continue to monitor
Pt already took losartan at home. Pt received coreg in ED. Additional BP med.

## 2017-05-03 ENCOUNTER — TRANSCRIPTION ENCOUNTER (OUTPATIENT)
Age: 47
End: 2017-05-03

## 2017-05-03 VITALS — WEIGHT: 203.05 LBS

## 2017-05-03 LAB
ANION GAP SERPL CALC-SCNC: 17 MMOL/L — SIGNIFICANT CHANGE UP (ref 5–17)
BASOPHILS # BLD AUTO: 0 K/UL — SIGNIFICANT CHANGE UP (ref 0–0.2)
BASOPHILS NFR BLD AUTO: 0.4 % — SIGNIFICANT CHANGE UP (ref 0–2)
BUN SERPL-MCNC: 100 MG/DL — HIGH (ref 7–23)
CALCIUM SERPL-MCNC: 8.7 MG/DL — SIGNIFICANT CHANGE UP (ref 8.4–10.5)
CHLORIDE SERPL-SCNC: 97 MMOL/L — SIGNIFICANT CHANGE UP (ref 96–108)
CO2 SERPL-SCNC: 22 MMOL/L — SIGNIFICANT CHANGE UP (ref 22–31)
CREAT SERPL-MCNC: 5.35 MG/DL — HIGH (ref 0.5–1.3)
EOSINOPHIL # BLD AUTO: 0 K/UL — SIGNIFICANT CHANGE UP (ref 0–0.5)
EOSINOPHIL NFR BLD AUTO: 0.3 % — SIGNIFICANT CHANGE UP (ref 0–6)
GLUCOSE SERPL-MCNC: 158 MG/DL — HIGH (ref 70–99)
HCT VFR BLD CALC: 31.7 % — LOW (ref 39–50)
HCT VFR BLD CALC: 32 % — LOW (ref 39–50)
HGB BLD-MCNC: 10.5 G/DL — LOW (ref 13–17)
HGB BLD-MCNC: 10.6 G/DL — LOW (ref 13–17)
LYMPHOCYTES # BLD AUTO: 0.8 K/UL — LOW (ref 1–3.3)
LYMPHOCYTES # BLD AUTO: 9.2 % — LOW (ref 13–44)
MCHC RBC-ENTMCNC: 27.7 PG — SIGNIFICANT CHANGE UP (ref 27–34)
MCHC RBC-ENTMCNC: 27.9 PG — SIGNIFICANT CHANGE UP (ref 27–34)
MCHC RBC-ENTMCNC: 33 GM/DL — SIGNIFICANT CHANGE UP (ref 32–36)
MCHC RBC-ENTMCNC: 33.2 GM/DL — SIGNIFICANT CHANGE UP (ref 32–36)
MCV RBC AUTO: 83.9 FL — SIGNIFICANT CHANGE UP (ref 80–100)
MCV RBC AUTO: 84.1 FL — SIGNIFICANT CHANGE UP (ref 80–100)
MONOCYTES # BLD AUTO: 1 K/UL — HIGH (ref 0–0.9)
MONOCYTES NFR BLD AUTO: 11.9 % — SIGNIFICANT CHANGE UP (ref 2–14)
NEUTROPHILS # BLD AUTO: 6.5 K/UL — SIGNIFICANT CHANGE UP (ref 1.8–7.4)
NEUTROPHILS NFR BLD AUTO: 78.3 % — HIGH (ref 43–77)
PLATELET # BLD AUTO: 162 K/UL — SIGNIFICANT CHANGE UP (ref 150–400)
PLATELET # BLD AUTO: 162 K/UL — SIGNIFICANT CHANGE UP (ref 150–400)
POTASSIUM SERPL-MCNC: 3.9 MMOL/L — SIGNIFICANT CHANGE UP (ref 3.5–5.3)
POTASSIUM SERPL-SCNC: 3.9 MMOL/L — SIGNIFICANT CHANGE UP (ref 3.5–5.3)
RBC # BLD: 3.77 M/UL — LOW (ref 4.2–5.8)
RBC # BLD: 3.81 M/UL — LOW (ref 4.2–5.8)
RBC # FLD: 15.3 % — HIGH (ref 10.3–14.5)
RBC # FLD: 15.4 % — HIGH (ref 10.3–14.5)
SODIUM SERPL-SCNC: 136 MMOL/L — SIGNIFICANT CHANGE UP (ref 135–145)
VANCOMYCIN TROUGH SERPL-MCNC: 10.1 UG/ML — SIGNIFICANT CHANGE UP (ref 10–20)
WBC # BLD: 8 K/UL — SIGNIFICANT CHANGE UP (ref 3.8–10.5)
WBC # BLD: 8.2 K/UL — SIGNIFICANT CHANGE UP (ref 3.8–10.5)
WBC # FLD AUTO: 8 K/UL — SIGNIFICANT CHANGE UP (ref 3.8–10.5)
WBC # FLD AUTO: 8.2 K/UL — SIGNIFICANT CHANGE UP (ref 3.8–10.5)

## 2017-05-03 PROCEDURE — 87040 BLOOD CULTURE FOR BACTERIA: CPT

## 2017-05-03 PROCEDURE — 80202 ASSAY OF VANCOMYCIN: CPT

## 2017-05-03 PROCEDURE — 82553 CREATINE MB FRACTION: CPT

## 2017-05-03 PROCEDURE — 93005 ELECTROCARDIOGRAM TRACING: CPT

## 2017-05-03 PROCEDURE — 99233 SBSQ HOSP IP/OBS HIGH 50: CPT | Mod: GC

## 2017-05-03 PROCEDURE — 82550 ASSAY OF CK (CPK): CPT

## 2017-05-03 PROCEDURE — 84300 ASSAY OF URINE SODIUM: CPT

## 2017-05-03 PROCEDURE — 83880 ASSAY OF NATRIURETIC PEPTIDE: CPT

## 2017-05-03 PROCEDURE — 99153 MOD SED SAME PHYS/QHP EA: CPT

## 2017-05-03 PROCEDURE — 99285 EMERGENCY DEPT VISIT HI MDM: CPT | Mod: 25

## 2017-05-03 PROCEDURE — C1722: CPT

## 2017-05-03 PROCEDURE — 80053 COMPREHEN METABOLIC PANEL: CPT

## 2017-05-03 PROCEDURE — 80048 BASIC METABOLIC PNL TOTAL CA: CPT

## 2017-05-03 PROCEDURE — 84100 ASSAY OF PHOSPHORUS: CPT

## 2017-05-03 PROCEDURE — 71046 X-RAY EXAM CHEST 2 VIEWS: CPT

## 2017-05-03 PROCEDURE — 81001 URINALYSIS AUTO W/SCOPE: CPT

## 2017-05-03 PROCEDURE — 82436 ASSAY OF URINE CHLORIDE: CPT

## 2017-05-03 PROCEDURE — 33249 INSJ/RPLCMT DEFIB W/LEAD(S): CPT | Mod: Q0

## 2017-05-03 PROCEDURE — 83735 ASSAY OF MAGNESIUM: CPT

## 2017-05-03 PROCEDURE — 85610 PROTHROMBIN TIME: CPT

## 2017-05-03 PROCEDURE — C1777: CPT

## 2017-05-03 PROCEDURE — 84484 ASSAY OF TROPONIN QUANT: CPT

## 2017-05-03 PROCEDURE — 83036 HEMOGLOBIN GLYCOSYLATED A1C: CPT

## 2017-05-03 PROCEDURE — 71045 X-RAY EXAM CHEST 1 VIEW: CPT

## 2017-05-03 PROCEDURE — C8929: CPT

## 2017-05-03 PROCEDURE — 99152 MOD SED SAME PHYS/QHP 5/>YRS: CPT

## 2017-05-03 PROCEDURE — 85027 COMPLETE CBC AUTOMATED: CPT

## 2017-05-03 PROCEDURE — C1892: CPT

## 2017-05-03 PROCEDURE — 93010 ELECTROCARDIOGRAM REPORT: CPT

## 2017-05-03 PROCEDURE — 71010: CPT | Mod: 26

## 2017-05-03 RX ORDER — CEPHALEXIN 500 MG
1 CAPSULE ORAL
Qty: 5 | Refills: 0 | OUTPATIENT
Start: 2017-05-03 | End: 2017-05-06

## 2017-05-03 RX ORDER — AMLODIPINE BESYLATE 2.5 MG/1
1 TABLET ORAL
Qty: 30 | Refills: 0 | OUTPATIENT
Start: 2017-05-03 | End: 2017-06-02

## 2017-05-03 RX ORDER — HYDRALAZINE HCL 50 MG
3 TABLET ORAL
Qty: 0 | Refills: 0 | COMMUNITY
Start: 2017-05-03

## 2017-05-03 RX ORDER — HYDRALAZINE HCL 50 MG
3 TABLET ORAL
Qty: 270 | Refills: 0 | OUTPATIENT
Start: 2017-05-03 | End: 2017-06-02

## 2017-05-03 RX ORDER — ASPIRIN/CALCIUM CARB/MAGNESIUM 324 MG
1 TABLET ORAL
Qty: 0 | Refills: 0 | COMMUNITY
Start: 2017-05-03

## 2017-05-03 RX ORDER — LOSARTAN POTASSIUM 100 MG/1
1 TABLET, FILM COATED ORAL
Qty: 0 | Refills: 0 | COMMUNITY

## 2017-05-03 RX ORDER — CARVEDILOL PHOSPHATE 80 MG/1
1 CAPSULE, EXTENDED RELEASE ORAL
Qty: 60 | Refills: 0 | OUTPATIENT
Start: 2017-05-03 | End: 2017-06-02

## 2017-05-03 RX ORDER — ASPIRIN/CALCIUM CARB/MAGNESIUM 324 MG
1 TABLET ORAL
Qty: 0 | Refills: 0 | COMMUNITY

## 2017-05-03 RX ORDER — SITAGLIPTIN 50 MG/1
1 TABLET, FILM COATED ORAL
Qty: 30 | Refills: 0 | OUTPATIENT
Start: 2017-05-03 | End: 2017-06-02

## 2017-05-03 RX ORDER — ISOSORBIDE DINITRATE 5 MG/1
1 TABLET ORAL
Qty: 0 | Refills: 0 | COMMUNITY
Start: 2017-05-03

## 2017-05-03 RX ORDER — FERROUS SULFATE 325(65) MG
1 TABLET ORAL
Qty: 0 | Refills: 0 | COMMUNITY

## 2017-05-03 RX ORDER — ATORVASTATIN CALCIUM 80 MG/1
1 TABLET, FILM COATED ORAL
Qty: 30 | Refills: 0
Start: 2017-05-03 | End: 2017-06-02

## 2017-05-03 RX ORDER — ASPIRIN/CALCIUM CARB/MAGNESIUM 324 MG
1 TABLET ORAL
Qty: 30 | Refills: 0
Start: 2017-05-03 | End: 2017-06-02

## 2017-05-03 RX ORDER — CEPHALEXIN 500 MG
1 CAPSULE ORAL
Qty: 0 | Refills: 0 | COMMUNITY
Start: 2017-05-03

## 2017-05-03 RX ORDER — AMLODIPINE BESYLATE 2.5 MG/1
1 TABLET ORAL
Qty: 0 | Refills: 0 | COMMUNITY
Start: 2017-05-03

## 2017-05-03 RX ORDER — CARVEDILOL PHOSPHATE 80 MG/1
1 CAPSULE, EXTENDED RELEASE ORAL
Qty: 0 | Refills: 0 | COMMUNITY
Start: 2017-05-03

## 2017-05-03 RX ORDER — SITAGLIPTIN 50 MG/1
1 TABLET, FILM COATED ORAL
Qty: 0 | Refills: 0 | COMMUNITY

## 2017-05-03 RX ORDER — FUROSEMIDE 40 MG
1 TABLET ORAL
Qty: 0 | Refills: 0 | COMMUNITY

## 2017-05-03 RX ORDER — ISOSORBIDE DINITRATE 5 MG/1
1 TABLET ORAL
Qty: 90 | Refills: 0 | OUTPATIENT
Start: 2017-05-03 | End: 2017-06-02

## 2017-05-03 RX ORDER — ASPIRIN/CALCIUM CARB/MAGNESIUM 324 MG
1 TABLET ORAL
Qty: 0 | Refills: 0 | DISCHARGE
Start: 2017-05-03 | End: 2017-06-02

## 2017-05-03 RX ORDER — ATORVASTATIN CALCIUM 80 MG/1
1 TABLET, FILM COATED ORAL
Qty: 0 | Refills: 0 | COMMUNITY
Start: 2017-05-03

## 2017-05-03 RX ORDER — FERROUS SULFATE 325(65) MG
1 TABLET ORAL
Qty: 60 | Refills: 0 | OUTPATIENT
Start: 2017-05-03 | End: 2017-06-02

## 2017-05-03 RX ADMIN — Medication 75 MILLIGRAM(S): at 13:29

## 2017-05-03 RX ADMIN — CARVEDILOL PHOSPHATE 3.12 MILLIGRAM(S): 80 CAPSULE, EXTENDED RELEASE ORAL at 05:19

## 2017-05-03 RX ADMIN — AMLODIPINE BESYLATE 10 MILLIGRAM(S): 2.5 TABLET ORAL at 05:24

## 2017-05-03 RX ADMIN — ISOSORBIDE DINITRATE 10 MILLIGRAM(S): 5 TABLET ORAL at 13:29

## 2017-05-03 RX ADMIN — Medication 250 MILLIGRAM(S): at 17:09

## 2017-05-03 RX ADMIN — Medication 1: at 12:21

## 2017-05-03 RX ADMIN — Medication 650 MILLIGRAM(S): at 06:18

## 2017-05-03 RX ADMIN — CARVEDILOL PHOSPHATE 3.12 MILLIGRAM(S): 80 CAPSULE, EXTENDED RELEASE ORAL at 17:09

## 2017-05-03 RX ADMIN — ISOSORBIDE DINITRATE 10 MILLIGRAM(S): 5 TABLET ORAL at 05:19

## 2017-05-03 RX ADMIN — Medication 650 MILLIGRAM(S): at 17:11

## 2017-05-03 RX ADMIN — Medication 650 MILLIGRAM(S): at 05:24

## 2017-05-03 RX ADMIN — Medication 81 MILLIGRAM(S): at 12:21

## 2017-05-03 RX ADMIN — Medication 250 MILLIGRAM(S): at 13:29

## 2017-05-03 RX ADMIN — Medication 2: at 17:09

## 2017-05-03 RX ADMIN — Medication 75 MILLIGRAM(S): at 05:19

## 2017-05-03 RX ADMIN — Medication 250 MILLIGRAM(S): at 05:19

## 2017-05-03 NOTE — DISCHARGE NOTE ADULT - HOSPITAL COURSE
to be completed by attending 46 year old male with PMHx Diabetes Type II with foot Ulcers and CAD.  P/w to ED c/o chest pain onset yesterday.  Denies fever or abdominal pain.  4/26	cxray: Left pleural effusion. Left lower lobe pneumonia and/or atelectasis. The 	right lung is clear.    	-Trop 0.25 x2 likely 2/2 chf / gin  4/26 Night: FS 57 & 63 asymptomatic - after hypoglycemic protocol 108. D/W Dr. Castro pt. DMT2 - no lantus for tonight will f/u in AM  	- pt. remains hypotensive will give hydralazine 25 mg PO x 1 now as recommended by CHF team note - D/W dr. Castro   	- 4 Beats WCT - asymptomatic, electrolytes added to AM labs   4/27     Cardio: Lasix IV 80 mg BID                          Hydralazinr 25 mg TID                          Isodil 10 mg TID                          ICD pending               ID:Continue Ancef IV 4/28 and then complete 7 days po abx w keflex 500 	mg bid  4/27 Night: Endocrine paged back at 7:42pm ? consult called - advised to fix sliding scale to pt. specific as done - team will F/U with pt in AM     4/28 Continue IV Lasix for CHF  .     04/28 Night PA: Cardio: - Hydralazine 25--->50mg tid to reduce afterload.   - c/w Amlodipine ,Isordil  and Coreg 3.125mg bid.   - Monitor BP.                     04/30 Night PA: EP: NPO@[place] for possible AICD on Monday.   	- Hold SC Heparin in am.   5/1: Creat 5.32 /  - renal - likely from diuretics - hold today  	- AICD: cancelled today by EP (? fluid overload)  5/2: AICD placed today - anticipate discharge tomorrow  5/3 swelling of AICD insertion site. Seen by CCU NP        follow up with cbc and chest x-ray.    Dx--	CHF : lasix iv  	CP :  	GIN    	r/o Cellulitis LE on Ancef IV  	Elev Trop likely 2/2 chf/ gin

## 2017-05-03 NOTE — DISCHARGE NOTE ADULT - PATIENT PORTAL LINK FT
“You can access the [employer] Patient Portal, offered by [facility], by registering with the following website: [email or web address]”

## 2017-05-03 NOTE — DISCHARGE NOTE ADULT - MEDICATION SUMMARY - MEDICATIONS TO TAKE
I will START or STAY ON the medications listed below when I get home from the hospital:    aspirin 81 mg oral tablet, chewable  -- 1 tab(s) by mouth once a day  -- Indication: For Coronary artery disease    isosorbide dinitrate 10 mg oral tablet  -- 1 tab(s) by mouth 3 times a day  -- Indication: For Coronary artery disease    Januvia 100 mg oral tablet  -- 1 tab(s) by mouth once a day  -- Indication: For Diabetes mellitus    atorvastatin 40 mg oral tablet  -- 1 tab(s) by mouth once a day (at bedtime)  -- Indication: For Cholesterol management    carvedilol 3.125 mg oral tablet  -- 1 tab(s) by mouth every 12 hours  -- Indication: For Acute on chronic heart failure    amLODIPine 10 mg oral tablet  -- 1 tab(s) by mouth once a day  -- Indication: For hypertension    Keflex 250 mg oral capsule  -- 1 cap(s) by mouth every 12 hours stop after last dose on May 5th  -- Indication: For Cellulitis of extremity    ferrous sulfate 325 mg (65 mg elemental iron) oral tablet  -- 1 tab(s) by mouth 2 times a day  -- Indication: For suppliment    hydrALAZINE 25 mg oral tablet  -- 3 tab(s) by mouth 3 times a day  -- Indication: For hypertension

## 2017-05-03 NOTE — PROVIDER CONTACT NOTE (OTHER) - ASSESSMENT
Pt AOx4. No c/o pain/discomfort. BP is 184/98 manually. Pt is asymptomatic.
AAOx4. VSS exc /87. Pain 6/10. Increased swelling since AICD placed on 5/2.
Pt A+Ox4, No c/o of pain or discomfort. Pt states had just spoken to MD and had gotten excited but is much calmer now.
alert, vss, site is NOT ecchymotic but is swollen. extremity  with no numbness or tingling
pt denies any fatigue/weakness, VSS, about to eat dinner
pt denies fatigue/weakness says he just needs to eat something aside from juice, VSS
pt is aox4, is having small nose bleed not on AC
Pt AOx4. No c/o pain/discomfort. BP is elevated. Pt is asymptomatic. Pt states he only ate a piece of chicken today.
Pt AOx4. No c/o pain/discomfort. VSS. Pt is asymptomatic.

## 2017-05-03 NOTE — DISCHARGE NOTE ADULT - FINDINGS/TREATMENT
4/26/17: Transthoracic Echocardiogram: Observations:  Mitral Valve: Normal mitral valve. Moderate mitral regurgitation.  Aortic Valve/Aorta: Normal trileaflet aortic valve. Minimal aortic regurgitation. Aortic Root: 3.2 cm.  Left Atrium: Mildly dilated left atrium.  LA volume index = 39 cc/m2.  Left Ventricle: Severe global left ventricular systolic dysfunction. Endocardial visualization enhanced with  intravenous injection of echo contrast (Definity). No left ventricular thrombus. Mild concentric left ventricular  hypertrophy. Severe  diastolic dysfunction (Stage III).  Right Heart: Normal right atrium. Decreased right ventricular systolic function. Normal tricuspid valve. Mild  tricuspid regurgitation. Normal pulmonic valve. Mild pulmonic regurgitation.  Pericardium/Pleura: Normal pericardium with no pericardial effusion.  Left pleural effusion.  Hemodynamic: Estimated right atrial pressure is 8 mm Hg.  Estimated right ventricular systolic pressure equals 37 mmHg, assuming right atrial pressure equals 8 mm Hg, consistent with borderline pulmonary hypertension

## 2017-05-03 NOTE — PROVIDER CONTACT NOTE (OTHER) - NAME OF MD/NP/PA/DO NOTIFIED:
Crow Pablo NP
Arianne Dawson NP
Arianne Dawson NP
SHERIE Chris
SHERIE Lora
SHERIE Lora
lobito fonseca np
michel fonseca np
SHERIE Lora

## 2017-05-03 NOTE — PROVIDER CONTACT NOTE (OTHER) - ACTION/TREATMENT ORDERED:
Samy BREAUX aware. Tylenol given for pain with partial relief. LCW site seen by Cardiology NP; site demarcated for size. STAT CBC and urgent portable CXR ordered. Will continue to monitor pt. Samy BREAUX aware. Tylenol given for pain with partial relief. LCW site seen by Ricki BREAUX (cardio NP); site demarcated for size. STAT CBC and urgent portable CXR ordered. Will continue to monitor pt.

## 2017-05-03 NOTE — DISCHARGE NOTE ADULT - PLAN OF CARE
symptom management Weigh yourself daily.  If you gain 3lbs in 3 days, or 5lbs in a week call your Health Care Provider.  Do not eat or drink foods containing more than 2000mg of salt (sodium) in your diet every day.  Call your Health Care Provider if you have any swelling or increased swelling in your feet, ankles, and/or stomach.  Take all of your medication as directed.  If you become dizzy call your Health Care Provider. Avoid taking (NSAIDs) - (ex: Ibuprofen, Advil, Celebrex, Naprosyn)  Avoid taking any nephrotoxic agents (can harm kidneys) - Intravenous contrast for diagnostic testing, combination cold medications.  Have all medications adjusted for your renal function by your Health Care Provider.  Blood pressure control is important.  Take all medication as prescribed. disease management Your AICD can sense and treat certain abnormal heart beats  If your AICD gives you a shock (you will probably feel it), let your doctor know so he/she can check the device & make changes in the device as needed or change your medication.  Check your device as directed on a regular basis  Talk to your doctor about driving permission  Avoid electric or magnetic equipment   If you are not able to use metal detectors at airports, ask for hand security search  Tell any doctors or nurses that you have an AICD  You cannot have an MRI  You may want to get a medical alert bracelet indicating that you have an AICD  Follow directions your doctor gave you regarding arm movement & exercises, lifting, sling use.  Always carry your AICD card in your wallet.  It is important to know what brand of AICD you have in case of emergency appropriate functioning resolved complete antibiotic course

## 2017-05-03 NOTE — DISCHARGE NOTE ADULT - CARE PLAN
Principal Discharge DX:	Acute on chronic heart failure  Goal:	symptom management  Instructions for follow-up, activity and diet:	Weigh yourself daily.  If you gain 3lbs in 3 days, or 5lbs in a week call your Health Care Provider.  Do not eat or drink foods containing more than 2000mg of salt (sodium) in your diet every day.  Call your Health Care Provider if you have any swelling or increased swelling in your feet, ankles, and/or stomach.  Take all of your medication as directed.  If you become dizzy call your Health Care Provider.  Secondary Diagnosis:	Acute on chronic kidney failure  Goal:	disease management  Instructions for follow-up, activity and diet:	Avoid taking (NSAIDs) - (ex: Ibuprofen, Advil, Celebrex, Naprosyn)  Avoid taking any nephrotoxic agents (can harm kidneys) - Intravenous contrast for diagnostic testing, combination cold medications.  Have all medications adjusted for your renal function by your Health Care Provider.  Blood pressure control is important.  Take all medication as prescribed.  Secondary Diagnosis:	AICD (automatic cardioverter/defibrillator) present  Goal:	appropriate functioning  Instructions for follow-up, activity and diet:	Your AICD can sense and treat certain abnormal heart beats  If your AICD gives you a shock (you will probably feel it), let your doctor know so he/she can check the device & make changes in the device as needed or change your medication.  Check your device as directed on a regular basis  Talk to your doctor about driving permission  Avoid electric or magnetic equipment   If you are not able to use metal detectors at airports, ask for hand security search  Tell any doctors or nurses that you have an AICD  You cannot have an MRI  You may want to get a medical alert bracelet indicating that you have an AICD  Follow directions your doctor gave you regarding arm movement & exercises, lifting, sling use.  Always carry your AICD card in your wallet.  It is important to know what brand of AICD you have in case of emergency  Secondary Diagnosis:	Cellulitis of extremity Principal Discharge DX:	Acute on chronic heart failure  Goal:	symptom management  Instructions for follow-up, activity and diet:	Weigh yourself daily.  If you gain 3lbs in 3 days, or 5lbs in a week call your Health Care Provider.  Do not eat or drink foods containing more than 2000mg of salt (sodium) in your diet every day.  Call your Health Care Provider if you have any swelling or increased swelling in your feet, ankles, and/or stomach.  Take all of your medication as directed.  If you become dizzy call your Health Care Provider.  Secondary Diagnosis:	Acute on chronic kidney failure  Goal:	disease management  Instructions for follow-up, activity and diet:	Avoid taking (NSAIDs) - (ex: Ibuprofen, Advil, Celebrex, Naprosyn)  Avoid taking any nephrotoxic agents (can harm kidneys) - Intravenous contrast for diagnostic testing, combination cold medications.  Have all medications adjusted for your renal function by your Health Care Provider.  Blood pressure control is important.  Take all medication as prescribed.  Secondary Diagnosis:	AICD (automatic cardioverter/defibrillator) present  Goal:	appropriate functioning  Instructions for follow-up, activity and diet:	Your AICD can sense and treat certain abnormal heart beats  If your AICD gives you a shock (you will probably feel it), let your doctor know so he/she can check the device & make changes in the device as needed or change your medication.  Check your device as directed on a regular basis  Talk to your doctor about driving permission  Avoid electric or magnetic equipment   If you are not able to use metal detectors at airports, ask for hand security search  Tell any doctors or nurses that you have an AICD  You cannot have an MRI  You may want to get a medical alert bracelet indicating that you have an AICD  Follow directions your doctor gave you regarding arm movement & exercises, lifting, sling use.  Always carry your AICD card in your wallet.  It is important to know what brand of AICD you have in case of emergency  Secondary Diagnosis:	Cellulitis of extremity  Goal:	resolved  Instructions for follow-up, activity and diet:	complete antibiotic course

## 2017-05-03 NOTE — PROVIDER CONTACT NOTE (OTHER) - BACKGROUND
Pt admitted for chest pain. Pt is being treated for CHF, GIN and cellulitis.
Pt admitted with chest pain. Pt is being treated for CHF, GIN and cellulitis.
Pt adm for chest pain.
Pt admitted with chest pain, elevated Trops 2/2 chf, joe
adm for chest pain
pt admitted for cellulitis/CHF
pt admitted for chest pain
pt has hz of DM2, has been hypoglycemic during this admission, A1C 6.4
Pt admitted for chest pain. Pt being treated for CHF, GIN and cellulitis.

## 2017-05-03 NOTE — DISCHARGE NOTE ADULT - ADDITIONAL INSTRUCTIONS
You have an appointment with the electrophysiologist (for AICD check) on 5/10/17 at 3:15pm  Make appointments to follow up with all other physicians including [facility] (Dr Lakhani)  Bring all discharge paperwork including discharge medication list to follow up appointments You have an appointment with the electrophysiologist (for AICD check) on 5/10/17 at 3:15pm  Make appointments to follow up with all other physicians including [facility] (Dr Lakhani)  and your renal (kidney) physician  Bring all discharge paperwork including discharge medication list to follow up appointments You have an appointment in the [facility] Failure Clinic at [facility] on Monday, May 8 - call to confirm the appointment  You have an appointment with the electrophysiologist (for AICD check) on 5/10/17 at 3:15pm  Make appointments to follow up with all other physicians including [facility] (Dr Lakhani)  and your renal (kidney) physician  Bring all discharge paperwork including discharge medication list to follow up appointments

## 2017-05-03 NOTE — DISCHARGE NOTE ADULT - MEDICATION SUMMARY - MEDICATIONS TO STOP TAKING
I will STOP taking the medications listed below when I get home from the hospital:    furosemide 40 mg oral tablet  -- 1 tab(s) by mouth 2 times a day    spironolactone 25 mg oral tablet  -- 1 tab(s) by mouth once a day    furosemide 40 mg oral tablet  -- 1 tab(s) by mouth once a day    losartan 25 mg oral tablet  -- 1 tab(s) by mouth once a day

## 2017-05-03 NOTE — PROVIDER CONTACT NOTE (OTHER) - DATE AND TIME:
26-Apr-2017 20:30
27-Apr-2017 21:28
03-May-2017 00:02
01-May-2017 16:38
02-May-2017 18:30
27-Apr-2017 03:20
27-Apr-2017 08:30
27-Apr-2017 17:00
28-Apr-2017 19:45

## 2017-05-03 NOTE — DISCHARGE NOTE ADULT - SECONDARY DIAGNOSIS.
Acute on chronic kidney failure AICD (automatic cardioverter/defibrillator) present Cellulitis of extremity

## 2017-05-03 NOTE — PROVIDER CONTACT NOTE (OTHER) - SITUATION
FS is 57, repeat is 63
Pt c/o "6/10 pain at left chest wall site and increased swelling" s/p AICD placement (5/2).
Pt had four beats of wide complex.
Pt hypertensive
Pt is complaining of nose bleed
pt FS 60
pt hypoglycemic this AM 69
pt with AICD placed today, site looks swollen
Pt is hypertensive.

## 2017-05-03 NOTE — DISCHARGE NOTE ADULT - CARE PROVIDER_API CALL
Jose Braga), Cardiovascular Disease; Internal Medicine  [address] [place] [address]  Phone: [phone]  Fax: [phone]    Edinson Lakhani (MD; MPH), Adv Heart Fail Trnsplnt Cardio; Cardiology; Internal Medicine  [address] [place] [address]  Phone: [phone]  Fax: [phone]    Junior Castro), Internal Medicine  [address] [place] [address]  Phone: [phone]  Fax: [phone]    Bharath Romo), Internal Medicine; Nephrology  [address]  [place] [address]  Phone: [phone]  Fax: [phone]

## 2017-05-03 NOTE — PROVIDER CONTACT NOTE (OTHER) - REASON
[facility] 61
Pt had cardiac arrhythmia
Pt hypertensive
Pt is complaining of nose bleed
pt hypoglycemic this AM 70
Pt is hypoglycemic
Pain and swelling at AICD site
Pt is hypertensive

## 2017-05-08 ENCOUNTER — APPOINTMENT (OUTPATIENT)
Dept: CARDIOLOGY | Facility: CLINIC | Age: 47
End: 2017-05-08

## 2017-05-08 VITALS
WEIGHT: 207 LBS | HEART RATE: 82 BPM | OXYGEN SATURATION: 95 % | HEIGHT: 70 IN | DIASTOLIC BLOOD PRESSURE: 78 MMHG | SYSTOLIC BLOOD PRESSURE: 152 MMHG | RESPIRATION RATE: 15 BRPM | BODY MASS INDEX: 29.63 KG/M2

## 2017-05-08 DIAGNOSIS — Z82.49 FAMILY HISTORY OF ISCHEMIC HEART DISEASE AND OTHER DISEASES OF THE CIRCULATORY SYSTEM: ICD-10-CM

## 2017-05-08 DIAGNOSIS — Z87.898 PERSONAL HISTORY OF OTHER SPECIFIED CONDITIONS: ICD-10-CM

## 2017-05-08 LAB
ALBUMIN SERPL ELPH-MCNC: 3.6 G/DL
ALP BLD-CCNC: 117 U/L
ALT SERPL-CCNC: 9 U/L RC
ANION GAP SERPL CALC-SCNC: 22 MMOL/L
AST SERPL-CCNC: 17 U/L
BILIRUB SERPL-MCNC: 0.8 MG/DL
BUN SERPL-MCNC: 140 MG/DL
CALCIUM SERPL-MCNC: 8.7 MG/DL
CHLORIDE SERPL-SCNC: 88 MMOL/L
CO2 SERPL-SCNC: 17 MMOL/L
CREAT SERPL-MCNC: 6.8 MG/DL
GLUCOSE SERPL-MCNC: 131 MG/DL
POTASSIUM SERPL-SCNC: 4.6 MMOL/L
PROT SERPL-MCNC: 7.8 G/DL
SODIUM SERPL-SCNC: 127 MMOL/L

## 2017-05-08 RX ORDER — LINAGLIPTIN 5 MG/1
5 TABLET, FILM COATED ORAL DAILY
Refills: 0 | Status: DISCONTINUED | COMMUNITY
End: 2017-05-08

## 2017-05-08 RX ORDER — FUROSEMIDE 40 MG/1
40 TABLET ORAL TWICE DAILY
Refills: 0 | Status: DISCONTINUED | COMMUNITY
End: 2017-05-08

## 2017-05-08 RX ORDER — SITAGLIPTIN 100 MG/1
100 TABLET, FILM COATED ORAL DAILY
Refills: 0 | Status: DISCONTINUED | COMMUNITY
Start: 2017-05-08 | End: 2017-05-08

## 2017-05-10 ENCOUNTER — APPOINTMENT (OUTPATIENT)
Dept: ELECTROPHYSIOLOGY | Facility: CLINIC | Age: 47
End: 2017-05-10

## 2017-05-10 VITALS
SYSTOLIC BLOOD PRESSURE: 149 MMHG | WEIGHT: 208 LBS | HEIGHT: 70 IN | HEART RATE: 83 BPM | DIASTOLIC BLOOD PRESSURE: 67 MMHG | BODY MASS INDEX: 29.78 KG/M2 | OXYGEN SATURATION: 96 %

## 2017-05-24 ENCOUNTER — APPOINTMENT (OUTPATIENT)
Dept: ELECTROPHYSIOLOGY | Facility: CLINIC | Age: 47
End: 2017-05-24

## 2017-05-29 ENCOUNTER — INPATIENT (INPATIENT)
Facility: HOSPITAL | Age: 47
LOS: 28 days | Discharge: HOME | DRG: 264 | End: 2017-06-27
Attending: INTERNAL MEDICINE | Admitting: INTERNAL MEDICINE
Payer: MEDICAID

## 2017-05-29 VITALS
DIASTOLIC BLOOD PRESSURE: 78 MMHG | RESPIRATION RATE: 14 BRPM | OXYGEN SATURATION: 98 % | HEART RATE: 50 BPM | SYSTOLIC BLOOD PRESSURE: 146 MMHG

## 2017-05-29 DIAGNOSIS — Z89.422 ACQUIRED ABSENCE OF OTHER LEFT TOE(S): Chronic | ICD-10-CM

## 2017-05-29 DIAGNOSIS — I50.23 ACUTE ON CHRONIC SYSTOLIC (CONGESTIVE) HEART FAILURE: ICD-10-CM

## 2017-05-29 DIAGNOSIS — Z95.1 PRESENCE OF AORTOCORONARY BYPASS GRAFT: Chronic | ICD-10-CM

## 2017-05-29 DIAGNOSIS — I10 ESSENTIAL (PRIMARY) HYPERTENSION: ICD-10-CM

## 2017-05-29 DIAGNOSIS — I25.810 ATHEROSCLEROSIS OF CORONARY ARTERY BYPASS GRAFT(S) WITHOUT ANGINA PECTORIS: ICD-10-CM

## 2017-05-29 DIAGNOSIS — N19 UNSPECIFIED KIDNEY FAILURE: ICD-10-CM

## 2017-05-29 DIAGNOSIS — N17.9 ACUTE KIDNEY FAILURE, UNSPECIFIED: ICD-10-CM

## 2017-05-29 DIAGNOSIS — E11.8 TYPE 2 DIABETES MELLITUS WITH UNSPECIFIED COMPLICATIONS: ICD-10-CM

## 2017-05-29 DIAGNOSIS — Z95.810 PRESENCE OF AUTOMATIC (IMPLANTABLE) CARDIAC DEFIBRILLATOR: Chronic | ICD-10-CM

## 2017-05-29 LAB
ALBUMIN SERPL ELPH-MCNC: 3.5 G/DL — SIGNIFICANT CHANGE UP (ref 3.3–5)
ALP SERPL-CCNC: 89 U/L — SIGNIFICANT CHANGE UP (ref 40–120)
ALT FLD-CCNC: 16 U/L RC — SIGNIFICANT CHANGE UP (ref 10–45)
ANION GAP SERPL CALC-SCNC: 28 MMOL/L — HIGH (ref 5–17)
ANION GAP SERPL CALC-SCNC: 29 MMOL/L — HIGH (ref 5–17)
APPEARANCE UR: CLEAR — SIGNIFICANT CHANGE UP
APTT BLD: 31.3 SEC — SIGNIFICANT CHANGE UP (ref 27.5–37.4)
AST SERPL-CCNC: 19 U/L — SIGNIFICANT CHANGE UP (ref 10–40)
B-OH-BUTYR SERPL-SCNC: 0.2 MMOL/L — SIGNIFICANT CHANGE UP
BILIRUB SERPL-MCNC: 0.6 MG/DL — SIGNIFICANT CHANGE UP (ref 0.2–1.2)
BILIRUB UR-MCNC: NEGATIVE — SIGNIFICANT CHANGE UP
BLD GP AB SCN SERPL QL: NEGATIVE — SIGNIFICANT CHANGE UP
BUN SERPL-MCNC: 222 MG/DL — HIGH (ref 7–23)
BUN SERPL-MCNC: 232 MG/DL — HIGH (ref 7–23)
CALCIUM SERPL-MCNC: 7.9 MG/DL — LOW (ref 8.4–10.5)
CALCIUM SERPL-MCNC: 8.4 MG/DL — SIGNIFICANT CHANGE UP (ref 8.4–10.5)
CHLORIDE SERPL-SCNC: 84 MMOL/L — LOW (ref 96–108)
CHLORIDE SERPL-SCNC: 84 MMOL/L — LOW (ref 96–108)
CK MB BLD-MCNC: 14.4 % — HIGH (ref 0–3.5)
CK MB CFR SERPL CALC: 15.7 NG/ML — HIGH (ref 0–6.7)
CK SERPL-CCNC: 109 U/L — SIGNIFICANT CHANGE UP (ref 30–200)
CO2 SERPL-SCNC: 10 MMOL/L — CRITICAL LOW (ref 22–31)
CO2 SERPL-SCNC: 9 MMOL/L — CRITICAL LOW (ref 22–31)
COLOR SPEC: YELLOW — SIGNIFICANT CHANGE UP
COMMENT - URINE: SIGNIFICANT CHANGE UP
CREAT SERPL-MCNC: 9.05 MG/DL — HIGH (ref 0.5–1.3)
CREAT SERPL-MCNC: 9.08 MG/DL — HIGH (ref 0.5–1.3)
DIFF PNL FLD: NEGATIVE — SIGNIFICANT CHANGE UP
EPI CELLS # UR: SIGNIFICANT CHANGE UP /HPF
GAS PNL BLDV: SIGNIFICANT CHANGE UP
GAS PNL BLDV: SIGNIFICANT CHANGE UP
GLUCOSE SERPL-MCNC: 81 MG/DL — SIGNIFICANT CHANGE UP (ref 70–99)
GLUCOSE SERPL-MCNC: 83 MG/DL — SIGNIFICANT CHANGE UP (ref 70–99)
GLUCOSE UR QL: NEGATIVE — SIGNIFICANT CHANGE UP
INR BLD: 1.16 RATIO — SIGNIFICANT CHANGE UP (ref 0.88–1.16)
KETONES UR-MCNC: NEGATIVE — SIGNIFICANT CHANGE UP
LEUKOCYTE ESTERASE UR-ACNC: ABNORMAL
MAGNESIUM SERPL-MCNC: 2.8 MG/DL — HIGH (ref 1.6–2.6)
NITRITE UR-MCNC: NEGATIVE — SIGNIFICANT CHANGE UP
NT-PROBNP SERPL-SCNC: HIGH PG/ML (ref 0–300)
PH UR: 5.5 — SIGNIFICANT CHANGE UP (ref 5–8)
PHOSPHATE SERPL-MCNC: 10.8 MG/DL — HIGH (ref 2.5–4.5)
POTASSIUM SERPL-MCNC: 6.3 MMOL/L — CRITICAL HIGH (ref 3.5–5.3)
POTASSIUM SERPL-MCNC: 6.3 MMOL/L — CRITICAL HIGH (ref 3.5–5.3)
POTASSIUM SERPL-SCNC: 6.3 MMOL/L — CRITICAL HIGH (ref 3.5–5.3)
POTASSIUM SERPL-SCNC: 6.3 MMOL/L — CRITICAL HIGH (ref 3.5–5.3)
PROT SERPL-MCNC: 7.4 G/DL — SIGNIFICANT CHANGE UP (ref 6–8.3)
PROT UR-MCNC: 300 MG/DL
PROTHROM AB SERPL-ACNC: 12.6 SEC — SIGNIFICANT CHANGE UP (ref 9.8–12.7)
RBC CASTS # UR COMP ASSIST: SIGNIFICANT CHANGE UP /HPF (ref 0–2)
RH IG SCN BLD-IMP: POSITIVE — SIGNIFICANT CHANGE UP
SODIUM SERPL-SCNC: 122 MMOL/L — LOW (ref 135–145)
SODIUM SERPL-SCNC: 122 MMOL/L — LOW (ref 135–145)
SP GR SPEC: 1.02 — SIGNIFICANT CHANGE UP (ref 1.01–1.02)
TROPONIN T SERPL-MCNC: 0.24 NG/ML — HIGH (ref 0–0.06)
TSH SERPL-MCNC: 6.86 UIU/ML — HIGH (ref 0.27–4.2)
UROBILINOGEN FLD QL: NEGATIVE — SIGNIFICANT CHANGE UP
WBC UR QL: SIGNIFICANT CHANGE UP /HPF (ref 0–5)

## 2017-05-29 PROCEDURE — 99291 CRITICAL CARE FIRST HOUR: CPT | Mod: 25

## 2017-05-29 PROCEDURE — 93010 ELECTROCARDIOGRAM REPORT: CPT

## 2017-05-29 PROCEDURE — 71010: CPT | Mod: 26

## 2017-05-29 PROCEDURE — 71010: CPT | Mod: 26,77

## 2017-05-29 PROCEDURE — 36556 INSERT NON-TUNNEL CV CATH: CPT

## 2017-05-29 RX ORDER — INSULIN LISPRO 100/ML
VIAL (ML) SUBCUTANEOUS
Qty: 0 | Refills: 0 | Status: DISCONTINUED | OUTPATIENT
Start: 2017-05-29 | End: 2017-05-29

## 2017-05-29 RX ORDER — INSULIN LISPRO 100/ML
VIAL (ML) SUBCUTANEOUS AT BEDTIME
Qty: 0 | Refills: 0 | Status: DISCONTINUED | OUTPATIENT
Start: 2017-05-29 | End: 2017-05-29

## 2017-05-29 RX ORDER — AMLODIPINE BESYLATE 2.5 MG/1
10 TABLET ORAL DAILY
Qty: 0 | Refills: 0 | Status: DISCONTINUED | OUTPATIENT
Start: 2017-05-29 | End: 2017-06-05

## 2017-05-29 RX ORDER — ASPIRIN/CALCIUM CARB/MAGNESIUM 324 MG
81 TABLET ORAL DAILY
Qty: 0 | Refills: 0 | Status: DISCONTINUED | OUTPATIENT
Start: 2017-05-30 | End: 2017-06-05

## 2017-05-29 RX ORDER — ACETAMINOPHEN 500 MG
650 TABLET ORAL EVERY 6 HOURS
Qty: 0 | Refills: 0 | Status: DISCONTINUED | OUTPATIENT
Start: 2017-05-29 | End: 2017-06-05

## 2017-05-29 RX ORDER — DEXTROSE 50 % IN WATER 50 %
25 SYRINGE (ML) INTRAVENOUS ONCE
Qty: 0 | Refills: 0 | Status: DISCONTINUED | OUTPATIENT
Start: 2017-05-29 | End: 2017-06-05

## 2017-05-29 RX ORDER — CALCIUM GLUCONATE 100 MG/ML
2 VIAL (ML) INTRAVENOUS ONCE
Qty: 0 | Refills: 0 | Status: COMPLETED | OUTPATIENT
Start: 2017-05-29 | End: 2017-05-29

## 2017-05-29 RX ORDER — DEXTROSE 50 % IN WATER 50 %
12.5 SYRINGE (ML) INTRAVENOUS ONCE
Qty: 0 | Refills: 0 | Status: DISCONTINUED | OUTPATIENT
Start: 2017-05-29 | End: 2017-06-05

## 2017-05-29 RX ORDER — FUROSEMIDE 40 MG
60 TABLET ORAL ONCE
Qty: 0 | Refills: 0 | Status: COMPLETED | OUTPATIENT
Start: 2017-05-29 | End: 2017-05-29

## 2017-05-29 RX ORDER — ATORVASTATIN CALCIUM 80 MG/1
40 TABLET, FILM COATED ORAL AT BEDTIME
Qty: 0 | Refills: 0 | Status: DISCONTINUED | OUTPATIENT
Start: 2017-05-29 | End: 2017-06-05

## 2017-05-29 RX ORDER — INSULIN LISPRO 100/ML
VIAL (ML) SUBCUTANEOUS EVERY 4 HOURS
Qty: 0 | Refills: 0 | Status: DISCONTINUED | OUTPATIENT
Start: 2017-05-29 | End: 2017-05-31

## 2017-05-29 RX ORDER — DESMOPRESSIN ACETATE 0.1 MG/1
20 TABLET ORAL ONCE
Qty: 0 | Refills: 0 | Status: COMPLETED | OUTPATIENT
Start: 2017-05-29 | End: 2017-05-29

## 2017-05-29 RX ORDER — DEXTROSE 50 % IN WATER 50 %
50 SYRINGE (ML) INTRAVENOUS ONCE
Qty: 0 | Refills: 0 | Status: COMPLETED | OUTPATIENT
Start: 2017-05-29 | End: 2017-05-29

## 2017-05-29 RX ORDER — SODIUM CHLORIDE 9 MG/ML
1000 INJECTION, SOLUTION INTRAVENOUS
Qty: 0 | Refills: 0 | Status: DISCONTINUED | OUTPATIENT
Start: 2017-05-29 | End: 2017-05-29

## 2017-05-29 RX ORDER — INSULIN HUMAN 100 [IU]/ML
10 INJECTION, SOLUTION SUBCUTANEOUS ONCE
Qty: 0 | Refills: 0 | Status: COMPLETED | OUTPATIENT
Start: 2017-05-29 | End: 2017-05-29

## 2017-05-29 RX ORDER — CARVEDILOL PHOSPHATE 80 MG/1
3.12 CAPSULE, EXTENDED RELEASE ORAL EVERY 12 HOURS
Qty: 0 | Refills: 0 | Status: DISCONTINUED | OUTPATIENT
Start: 2017-05-29 | End: 2017-06-05

## 2017-05-29 RX ORDER — DEXTROSE 50 % IN WATER 50 %
50 SYRINGE (ML) INTRAVENOUS ONCE
Qty: 0 | Refills: 0 | Status: DISCONTINUED | OUTPATIENT
Start: 2017-05-29 | End: 2017-06-05

## 2017-05-29 RX ORDER — ALBUTEROL 90 UG/1
2.5 AEROSOL, METERED ORAL ONCE
Qty: 0 | Refills: 0 | Status: DISCONTINUED | OUTPATIENT
Start: 2017-05-29 | End: 2017-05-29

## 2017-05-29 RX ORDER — HYDRALAZINE HCL 50 MG
75 TABLET ORAL THREE TIMES A DAY
Qty: 0 | Refills: 0 | Status: DISCONTINUED | OUTPATIENT
Start: 2017-05-29 | End: 2017-05-30

## 2017-05-29 RX ORDER — DEXTROSE 50 % IN WATER 50 %
1 SYRINGE (ML) INTRAVENOUS ONCE
Qty: 0 | Refills: 0 | Status: DISCONTINUED | OUTPATIENT
Start: 2017-05-29 | End: 2017-06-05

## 2017-05-29 RX ORDER — FERROUS SULFATE 325(65) MG
325 TABLET ORAL
Qty: 0 | Refills: 0 | Status: DISCONTINUED | OUTPATIENT
Start: 2017-05-29 | End: 2017-06-05

## 2017-05-29 RX ORDER — CARVEDILOL PHOSPHATE 80 MG/1
3.12 CAPSULE, EXTENDED RELEASE ORAL EVERY 12 HOURS
Qty: 0 | Refills: 0 | Status: DISCONTINUED | OUTPATIENT
Start: 2017-05-29 | End: 2017-05-29

## 2017-05-29 RX ORDER — ISOSORBIDE DINITRATE 5 MG/1
10 TABLET ORAL THREE TIMES A DAY
Qty: 0 | Refills: 0 | Status: DISCONTINUED | OUTPATIENT
Start: 2017-05-29 | End: 2017-05-31

## 2017-05-29 RX ORDER — DOPAMINE HYDROCHLORIDE 40 MG/ML
10 INJECTION, SOLUTION, CONCENTRATE INTRAVENOUS
Qty: 400 | Refills: 0 | Status: DISCONTINUED | OUTPATIENT
Start: 2017-05-29 | End: 2017-05-29

## 2017-05-29 RX ORDER — DESMOPRESSIN ACETATE 0.1 MG/1
20 TABLET ORAL ONCE
Qty: 0 | Refills: 0 | Status: DISCONTINUED | OUTPATIENT
Start: 2017-05-29 | End: 2017-05-29

## 2017-05-29 RX ORDER — SODIUM CHLORIDE 9 MG/ML
1000 INJECTION, SOLUTION INTRAVENOUS
Qty: 0 | Refills: 0 | Status: DISCONTINUED | OUTPATIENT
Start: 2017-05-29 | End: 2017-06-05

## 2017-05-29 RX ORDER — GLUCAGON INJECTION, SOLUTION 0.5 MG/.1ML
1 INJECTION, SOLUTION SUBCUTANEOUS ONCE
Qty: 0 | Refills: 0 | Status: DISCONTINUED | OUTPATIENT
Start: 2017-05-29 | End: 2017-06-05

## 2017-05-29 RX ORDER — HEPARIN SODIUM 5000 [USP'U]/ML
5000 INJECTION INTRAVENOUS; SUBCUTANEOUS EVERY 8 HOURS
Qty: 0 | Refills: 0 | Status: DISCONTINUED | OUTPATIENT
Start: 2017-05-29 | End: 2017-06-05

## 2017-05-29 RX ORDER — ALBUTEROL 90 UG/1
7.5 AEROSOL, METERED ORAL ONCE
Qty: 0 | Refills: 0 | Status: COMPLETED | OUTPATIENT
Start: 2017-05-29 | End: 2017-05-29

## 2017-05-29 RX ADMIN — ISOSORBIDE DINITRATE 10 MILLIGRAM(S): 5 TABLET ORAL at 23:48

## 2017-05-29 RX ADMIN — Medication 60 MILLIGRAM(S): at 14:34

## 2017-05-29 RX ADMIN — ALBUTEROL 7.5 MILLIGRAM(S): 90 AEROSOL, METERED ORAL at 18:50

## 2017-05-29 RX ADMIN — ATORVASTATIN CALCIUM 40 MILLIGRAM(S): 80 TABLET, FILM COATED ORAL at 21:30

## 2017-05-29 RX ADMIN — SODIUM CHLORIDE 40 MILLILITER(S): 9 INJECTION, SOLUTION INTRAVENOUS at 21:40

## 2017-05-29 RX ADMIN — HEPARIN SODIUM 5000 UNIT(S): 5000 INJECTION INTRAVENOUS; SUBCUTANEOUS at 21:29

## 2017-05-29 RX ADMIN — Medication 50 MILLILITER(S): at 20:41

## 2017-05-29 RX ADMIN — INSULIN HUMAN 10 UNIT(S): 100 INJECTION, SOLUTION SUBCUTANEOUS at 14:42

## 2017-05-29 RX ADMIN — Medication 50 MILLILITER(S): at 14:33

## 2017-05-29 RX ADMIN — Medication 75 MILLIGRAM(S): at 21:37

## 2017-05-29 RX ADMIN — INSULIN HUMAN 10 UNIT(S): 100 INJECTION, SOLUTION SUBCUTANEOUS at 20:41

## 2017-05-29 RX ADMIN — DESMOPRESSIN ACETATE 220 MICROGRAM(S): 0.1 TABLET ORAL at 18:53

## 2017-05-29 RX ADMIN — Medication 200 GRAM(S): at 14:34

## 2017-05-29 RX ADMIN — Medication 50 MILLILITER(S): at 21:52

## 2017-05-29 RX ADMIN — Medication 200 GRAM(S): at 20:40

## 2017-05-29 NOTE — ED PROVIDER NOTE - CARE PLAN
Principal Discharge DX:	Uremic syndrome  Secondary Diagnosis:	Acute on chronic systolic heart failure  Secondary Diagnosis:	Hyperkalemia

## 2017-05-29 NOTE — ED PROVIDER NOTE - MEDICAL DECISION MAKING DETAILS
46M with acute on chronic HFrEF complicated by Uremia with hyperkalemia. Patient is aware and will be admitted to ICU for urgent dialysis.

## 2017-05-29 NOTE — H&P ADULT. - PROBLEM SELECTOR PLAN 3
C/b sensory neuropathy, had been on januvia reports he recently stopped taking it. A1C controlled at 6.4 during previous admission  -DM diet, HISS  -A1C in AM

## 2017-05-29 NOTE — ED PROVIDER NOTE - CRITICAL CARE PROVIDED
additional history taking/documentation/consultation with other physicians/interpretation of diagnostic studies/direct patient care (not related to procedure)

## 2017-05-29 NOTE — ED ADULT NURSE NOTE - OBJECTIVE STATEMENT
Pt BIBA for evaluation of increasing fatigue and SOB.  Pt AXOX4, gross neuro intact.  Pt denies dizziness, N/V, F/C, pain/burning on urination, bleeding, dark stools, injury/falls.  Pt had AICD placed 1 month ago.  Pt 50s Sinus Oliverio on CM.  ECG completed and given to Attending.  Pt states decreased PO intake.  Pt also states increasing fatigue make walking more difficult. Pt Oliverio down to 40s, MD aware.  Pt has pronounced edema in b/l LE with redness.  Will continue to monitor.

## 2017-05-29 NOTE — H&P ADULT. - PROBLEM SELECTOR PLAN 2
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

## 2017-05-29 NOTE — H&P ADULT. - HISTORY OF PRESENT ILLNESS
Pt is a 45yo male w/ PMH HFrEF (17% 4/26/17), CAD s/p CABG x4 2016, ICM s/p recent AICD 5/2017, DM2 c/b toe ulcers presents with 6 days of progressive swelling, orthopnea, dyspnea in setting of 20lb weight gain since being discharged from the hospital 3 weeks prior. He had GIN at last admission so his diuretics were discontinued on discharge. He also is no longer taking Januvia. Last A1c was 6.4 at prior admission. He denies chest pain, palpitations, diarrhea/dysuria. Does make urine 4x per day. No cough, runny nose.  Cardiologist at last visit was Omi, follows oksana/ Jhonathan as outpatient. Pt is a 47yo male w/ PMH HFrEF (17% 4/26/17), CAD s/p CABG x4 2016, ICM s/p recent AICD 5/2017, DM2 c/b peripheral neuropathy w/ toe ulcers presents with 6 days of progressive swelling, orthopnea, dyspnea in setting of 20lb weight gain since being discharged from the hospital 3 weeks ago. Off diuretics since that admission as developed GIN during his hospital course. He also is no longer taking Januvia. Last A1c was 6.4 at prior admission. He denies chest pain, palpitations, diarrhea/dysuria. Does make urine 4x per day. No cough, runny nose. Cardiologist at last visit was Omi, follows w/ Jhonathan as outpatient.    In ED, VSS w/ bradycardia to high 40s. Na 122, K 6.3, HCO3 9 w/ AG 29. , Cr 9.05 from recent b/l 100/5.35 on 5/3.  INR 1.16, PTT 31.3. CBC remarkable only for mild anemia to 10.5 (@ baseline). Metabolic acidosis on VBG pH 7.25, pCO2 27, HCO3 11.  protein, small LE. CXR small L pleural effusion. Received Ca gluconate 2g, 10U regular insulin w/ 2 amps D50, lasix 60mg x1.

## 2017-05-29 NOTE — H&P ADULT. - PROBLEM SELECTOR PLAN 1
Pt w/ severe HFrEF and HFpEF w/ ICM s/p CABG x4 in 11/2016 w/ EF 17% and severe diastolic dysfunction on TTE (4/26)  -HD to optimize fluid status  -c/w medical management lipitor, coreg, isordil  -afterload reduction w/ amlodipine, hydralazine  -EP c/s to interrogate ICD

## 2017-05-29 NOTE — ED PROVIDER NOTE - ATTENDING CONTRIBUTION TO CARE
45yo M with recent AICD placement, CABG, CHF, DM present with sob and anasarca.  NAD, NCAT, PERRL, CN grossly intact, S1S2, CTAB, abd tense, nontender with no rebound/guarding/masses, anasarca, AICD site appears well with healing incision.  EKG, CXR, labs, reassess.

## 2017-05-29 NOTE — H&P ADULT. - RS GEN PE MLT RESP DETAILS PC
respirations non-labored/diminished breath sounds, L/breath sounds equal/diminished breath sounds, R

## 2017-05-29 NOTE — ED ADULT NURSE NOTE - PSH
AICD (automatic cardioverter/defibrillator) present    Breast Reduction  at age 18  S/P CABG (coronary artery bypass graft)    Toe amputation status, left

## 2017-05-29 NOTE — ED PROVIDER NOTE - OBJECTIVE STATEMENT
46M HFrEF 15%, CAD s/p CABG x4 '16, ICM s/p recent AICD 5/2017, DM2 with h/o toe ulcers presents with 6 days of progressive swelling, orthopnea, dysnea. He has gained 20lbs since being discharged from the hospital 3 weeks prior. He had GIN at last admission so his diuretics were discontinued on discharge. He also is no longer taking Januvia. Last A1c was 6.4 at prior admission. He denies chest pain, palpitations, diarrhea/dysuria. He makes urine 4x per day. No cough, runny nose.  Cardiologist at last visit was Omi.   [employer] implant date:5/2/17, Model # [id number]

## 2017-05-29 NOTE — H&P ADULT. - PROBLEM SELECTOR PROBLEM 4
Coronary artery disease involving coronary bypass graft of native heart, angina presence unspecified

## 2017-05-29 NOTE — H&P ADULT. - ASSESSMENT
45yo male w/ PMH HFrEF (17% 4/26/17), CAD s/p CABG x4 2016, ICM s/p recent AICD 5/2017, DM2 c/b toe ulcers presents with decompensated heart failure d/t volume overload in setting GIN on CKD 47yo male w/ PMH HFrEF (17% 4/26/17), CAD s/p CABG x4 2016, ICM s/p recent AICD 5/2017, DM2 c/b toe ulcers presents with decompensated heart failure and acute renal failure in setting weight gain and recently stopped diuretics for GIN

## 2017-05-29 NOTE — H&P ADULT. - PMH
Acute on chronic systolic heart failure    Coronary artery disease    Diabetes mellitus  type 2  Foot ulcer due to secondary DM

## 2017-05-30 LAB
ALBUMIN SERPL ELPH-MCNC: 3.4 G/DL — SIGNIFICANT CHANGE UP (ref 3.3–5)
ALP SERPL-CCNC: 85 U/L — SIGNIFICANT CHANGE UP (ref 40–120)
ALT FLD-CCNC: 17 U/L RC — SIGNIFICANT CHANGE UP (ref 10–45)
ANION GAP SERPL CALC-SCNC: 27 MMOL/L — HIGH (ref 5–17)
ANION GAP SERPL CALC-SCNC: 27 MMOL/L — HIGH (ref 5–17)
APTT BLD: 28.4 SEC — SIGNIFICANT CHANGE UP (ref 27.5–37.4)
APTT BLD: > 200 SEC (ref 27.5–37.4)
APTT BLD: > 200 SEC (ref 27.5–37.4)
AST SERPL-CCNC: 21 U/L — SIGNIFICANT CHANGE UP (ref 10–40)
BASOPHILS # BLD AUTO: 0.1 K/UL — SIGNIFICANT CHANGE UP (ref 0–0.2)
BASOPHILS NFR BLD AUTO: 0.7 % — SIGNIFICANT CHANGE UP (ref 0–2)
BILIRUB SERPL-MCNC: 0.8 MG/DL — SIGNIFICANT CHANGE UP (ref 0.2–1.2)
BUN SERPL-MCNC: 172 MG/DL — HIGH (ref 7–23)
BUN SERPL-MCNC: 183 MG/DL — HIGH (ref 7–23)
CALCIUM SERPL-MCNC: 7.9 MG/DL — LOW (ref 8.4–10.5)
CALCIUM SERPL-MCNC: 8.4 MG/DL — SIGNIFICANT CHANGE UP (ref 8.4–10.5)
CHLORIDE SERPL-SCNC: 85 MMOL/L — LOW (ref 96–108)
CHLORIDE SERPL-SCNC: 86 MMOL/L — LOW (ref 96–108)
CO2 SERPL-SCNC: 13 MMOL/L — LOW (ref 22–31)
CO2 SERPL-SCNC: 14 MMOL/L — LOW (ref 22–31)
CREAT SERPL-MCNC: 7.05 MG/DL — HIGH (ref 0.5–1.3)
CREAT SERPL-MCNC: 7.67 MG/DL — HIGH (ref 0.5–1.3)
EOSINOPHIL # BLD AUTO: 0 K/UL — SIGNIFICANT CHANGE UP (ref 0–0.5)
EOSINOPHIL NFR BLD AUTO: 0.1 % — SIGNIFICANT CHANGE UP (ref 0–6)
GLUCOSE SERPL-MCNC: 112 MG/DL — HIGH (ref 70–99)
GLUCOSE SERPL-MCNC: 117 MG/DL — HIGH (ref 70–99)
HBV CORE IGM SER-ACNC: SIGNIFICANT CHANGE UP
HBV SURFACE AB SER-ACNC: <3 MIU/ML — LOW
HBV SURFACE AG SER-ACNC: SIGNIFICANT CHANGE UP
HCT VFR BLD CALC: 29.2 % — LOW (ref 39–50)
HCV AB S/CO SERPL IA: 0.36 S/CO — SIGNIFICANT CHANGE UP
HCV AB SERPL-IMP: SIGNIFICANT CHANGE UP
HGB BLD-MCNC: 10.1 G/DL — LOW (ref 13–17)
INR BLD: 1.16 RATIO — SIGNIFICANT CHANGE UP (ref 0.88–1.16)
INR BLD: 1.31 RATIO — HIGH (ref 0.88–1.16)
INR BLD: 2.07 RATIO — HIGH (ref 0.88–1.16)
LYMPHOCYTES # BLD AUTO: 0.2 K/UL — LOW (ref 1–3.3)
LYMPHOCYTES # BLD AUTO: 3 % — LOW (ref 13–44)
MAGNESIUM SERPL-MCNC: 2.7 MG/DL — HIGH (ref 1.6–2.6)
MCHC RBC-ENTMCNC: 29.7 PG — SIGNIFICANT CHANGE UP (ref 27–34)
MCHC RBC-ENTMCNC: 34.6 GM/DL — SIGNIFICANT CHANGE UP (ref 32–36)
MCV RBC AUTO: 85.8 FL — SIGNIFICANT CHANGE UP (ref 80–100)
MONOCYTES # BLD AUTO: 0.4 K/UL — SIGNIFICANT CHANGE UP (ref 0–0.9)
MONOCYTES NFR BLD AUTO: 5.4 % — SIGNIFICANT CHANGE UP (ref 2–14)
NEUTROPHILS # BLD AUTO: 6.6 K/UL — SIGNIFICANT CHANGE UP (ref 1.8–7.4)
NEUTROPHILS NFR BLD AUTO: 90.7 % — HIGH (ref 43–77)
PHOSPHATE SERPL-MCNC: 8.3 MG/DL — HIGH (ref 2.5–4.5)
PLATELET # BLD AUTO: 143 K/UL — LOW (ref 150–400)
POTASSIUM SERPL-MCNC: 4.9 MMOL/L — SIGNIFICANT CHANGE UP (ref 3.5–5.3)
POTASSIUM SERPL-MCNC: 5 MMOL/L — SIGNIFICANT CHANGE UP (ref 3.5–5.3)
POTASSIUM SERPL-SCNC: 4.9 MMOL/L — SIGNIFICANT CHANGE UP (ref 3.5–5.3)
POTASSIUM SERPL-SCNC: 5 MMOL/L — SIGNIFICANT CHANGE UP (ref 3.5–5.3)
PROT SERPL-MCNC: 7.2 G/DL — SIGNIFICANT CHANGE UP (ref 6–8.3)
PROTHROM AB SERPL-ACNC: 12.7 SEC — SIGNIFICANT CHANGE UP (ref 9.8–12.7)
PROTHROM AB SERPL-ACNC: 14.2 SEC — HIGH (ref 9.8–12.7)
PROTHROM AB SERPL-ACNC: 22.9 SEC — HIGH (ref 9.8–12.7)
RBC # BLD: 3.4 M/UL — LOW (ref 4.2–5.8)
RBC # FLD: 15.1 % — HIGH (ref 10.3–14.5)
SODIUM SERPL-SCNC: 125 MMOL/L — LOW (ref 135–145)
SODIUM SERPL-SCNC: 127 MMOL/L — LOW (ref 135–145)
T3 SERPL-MCNC: 47 NG/DL — LOW (ref 80–200)
T4 AB SER-ACNC: 5.8 UG/DL — SIGNIFICANT CHANGE UP (ref 4.6–12)
TSH SERPL-MCNC: 4.71 UIU/ML — HIGH (ref 0.27–4.2)
WBC # BLD: 7.3 K/UL — SIGNIFICANT CHANGE UP (ref 3.8–10.5)
WBC # FLD AUTO: 7.3 K/UL — SIGNIFICANT CHANGE UP (ref 3.8–10.5)

## 2017-05-30 PROCEDURE — 99223 1ST HOSP IP/OBS HIGH 75: CPT | Mod: GC

## 2017-05-30 PROCEDURE — 99291 CRITICAL CARE FIRST HOUR: CPT

## 2017-05-30 RX ORDER — HYDRALAZINE HCL 50 MG
100 TABLET ORAL THREE TIMES A DAY
Qty: 0 | Refills: 0 | Status: DISCONTINUED | OUTPATIENT
Start: 2017-05-30 | End: 2017-06-05

## 2017-05-30 RX ADMIN — ISOSORBIDE DINITRATE 10 MILLIGRAM(S): 5 TABLET ORAL at 21:54

## 2017-05-30 RX ADMIN — ISOSORBIDE DINITRATE 10 MILLIGRAM(S): 5 TABLET ORAL at 14:36

## 2017-05-30 RX ADMIN — Medication 81 MILLIGRAM(S): at 11:58

## 2017-05-30 RX ADMIN — HEPARIN SODIUM 5000 UNIT(S): 5000 INJECTION INTRAVENOUS; SUBCUTANEOUS at 05:01

## 2017-05-30 RX ADMIN — HEPARIN SODIUM 5000 UNIT(S): 5000 INJECTION INTRAVENOUS; SUBCUTANEOUS at 14:56

## 2017-05-30 RX ADMIN — Medication 75 MILLIGRAM(S): at 05:01

## 2017-05-30 RX ADMIN — CARVEDILOL PHOSPHATE 3.12 MILLIGRAM(S): 80 CAPSULE, EXTENDED RELEASE ORAL at 17:21

## 2017-05-30 RX ADMIN — AMLODIPINE BESYLATE 10 MILLIGRAM(S): 2.5 TABLET ORAL at 11:59

## 2017-05-30 RX ADMIN — ISOSORBIDE DINITRATE 10 MILLIGRAM(S): 5 TABLET ORAL at 05:02

## 2017-05-30 RX ADMIN — Medication 100 MILLIGRAM(S): at 21:54

## 2017-05-30 RX ADMIN — CARVEDILOL PHOSPHATE 3.12 MILLIGRAM(S): 80 CAPSULE, EXTENDED RELEASE ORAL at 05:02

## 2017-05-30 RX ADMIN — HEPARIN SODIUM 5000 UNIT(S): 5000 INJECTION INTRAVENOUS; SUBCUTANEOUS at 21:54

## 2017-05-30 RX ADMIN — Medication 100 MILLIGRAM(S): at 14:37

## 2017-05-30 RX ADMIN — Medication 325 MILLIGRAM(S): at 08:09

## 2017-05-30 RX ADMIN — ATORVASTATIN CALCIUM 40 MILLIGRAM(S): 80 TABLET, FILM COATED ORAL at 21:54

## 2017-05-30 RX ADMIN — Medication 325 MILLIGRAM(S): at 17:21

## 2017-05-30 RX ADMIN — Medication 1: at 21:54

## 2017-05-31 ENCOUNTER — TRANSCRIPTION ENCOUNTER (OUTPATIENT)
Age: 47
End: 2017-05-31

## 2017-05-31 DIAGNOSIS — R69 ILLNESS, UNSPECIFIED: ICD-10-CM

## 2017-05-31 LAB
ALBUMIN SERPL ELPH-MCNC: 3.3 G/DL — SIGNIFICANT CHANGE UP (ref 3.3–5)
ALP SERPL-CCNC: 82 U/L — SIGNIFICANT CHANGE UP (ref 40–120)
ALT FLD-CCNC: 12 U/L — SIGNIFICANT CHANGE UP (ref 10–45)
ANION GAP SERPL CALC-SCNC: 20 MMOL/L — HIGH (ref 5–17)
APTT BLD: 30.5 SEC — SIGNIFICANT CHANGE UP (ref 27.5–37.4)
AST SERPL-CCNC: 22 U/L — SIGNIFICANT CHANGE UP (ref 10–40)
BASOPHILS # BLD AUTO: 0.01 K/UL — SIGNIFICANT CHANGE UP (ref 0–0.2)
BASOPHILS NFR BLD AUTO: 0.2 % — SIGNIFICANT CHANGE UP (ref 0–2)
BILIRUB SERPL-MCNC: 0.7 MG/DL — SIGNIFICANT CHANGE UP (ref 0.2–1.2)
BUN SERPL-MCNC: 114 MG/DL — HIGH (ref 7–23)
CALCIUM SERPL-MCNC: 7.6 MG/DL — LOW (ref 8.4–10.5)
CHLORIDE SERPL-SCNC: 89 MMOL/L — LOW (ref 96–108)
CO2 SERPL-SCNC: 20 MMOL/L — LOW (ref 22–31)
CREAT SERPL-MCNC: 5.65 MG/DL — HIGH (ref 0.5–1.3)
EOSINOPHIL # BLD AUTO: 0.02 K/UL — SIGNIFICANT CHANGE UP (ref 0–0.5)
EOSINOPHIL NFR BLD AUTO: 0.3 % — SIGNIFICANT CHANGE UP (ref 0–6)
GLUCOSE SERPL-MCNC: 112 MG/DL — HIGH (ref 70–99)
HCT VFR BLD CALC: 26.4 % — LOW (ref 39–50)
HGB BLD-MCNC: 9 G/DL — LOW (ref 13–17)
IMM GRANULOCYTES NFR BLD AUTO: 0.5 % — SIGNIFICANT CHANGE UP (ref 0–1.5)
INR BLD: 1.08 RATIO — SIGNIFICANT CHANGE UP (ref 0.88–1.16)
LYMPHOCYTES # BLD AUTO: 1.02 K/UL — SIGNIFICANT CHANGE UP (ref 1–3.3)
LYMPHOCYTES # BLD AUTO: 15.9 % — SIGNIFICANT CHANGE UP (ref 13–44)
MAGNESIUM SERPL-MCNC: 2.4 MG/DL — SIGNIFICANT CHANGE UP (ref 1.6–2.6)
MCHC RBC-ENTMCNC: 28 PG — SIGNIFICANT CHANGE UP (ref 27–34)
MCHC RBC-ENTMCNC: 34.1 GM/DL — SIGNIFICANT CHANGE UP (ref 32–36)
MCV RBC AUTO: 82 FL — SIGNIFICANT CHANGE UP (ref 80–100)
MONOCYTES # BLD AUTO: 0.33 K/UL — SIGNIFICANT CHANGE UP (ref 0–0.9)
MONOCYTES NFR BLD AUTO: 5.1 % — SIGNIFICANT CHANGE UP (ref 2–14)
NEUTROPHILS # BLD AUTO: 5.02 K/UL — SIGNIFICANT CHANGE UP (ref 1.8–7.4)
NEUTROPHILS NFR BLD AUTO: 78 % — HIGH (ref 43–77)
PHOSPHATE SERPL-MCNC: 7.3 MG/DL — HIGH (ref 2.5–4.5)
PLATELET # BLD AUTO: 145 K/UL — LOW (ref 150–400)
POTASSIUM SERPL-MCNC: 4.5 MMOL/L — SIGNIFICANT CHANGE UP (ref 3.5–5.3)
POTASSIUM SERPL-SCNC: 4.5 MMOL/L — SIGNIFICANT CHANGE UP (ref 3.5–5.3)
PROT SERPL-MCNC: 6.8 G/DL — SIGNIFICANT CHANGE UP (ref 6–8.3)
PROTHROM AB SERPL-ACNC: 12.2 SEC — SIGNIFICANT CHANGE UP (ref 10–13.1)
RBC # BLD: 3.22 M/UL — LOW (ref 4.2–5.8)
RBC # FLD: 17 % — HIGH (ref 10.3–14.5)
SODIUM SERPL-SCNC: 129 MMOL/L — LOW (ref 135–145)
WBC # BLD: 6.43 K/UL — SIGNIFICANT CHANGE UP (ref 3.8–10.5)
WBC # FLD AUTO: 6.43 K/UL — SIGNIFICANT CHANGE UP (ref 3.8–10.5)

## 2017-05-31 RX ORDER — INSULIN LISPRO 100/ML
VIAL (ML) SUBCUTANEOUS AT BEDTIME
Qty: 0 | Refills: 0 | Status: DISCONTINUED | OUTPATIENT
Start: 2017-05-31 | End: 2017-06-05

## 2017-05-31 RX ORDER — ISOSORBIDE DINITRATE 5 MG/1
20 TABLET ORAL THREE TIMES A DAY
Qty: 0 | Refills: 0 | Status: DISCONTINUED | OUTPATIENT
Start: 2017-05-31 | End: 2017-06-05

## 2017-05-31 RX ORDER — INSULIN LISPRO 100/ML
VIAL (ML) SUBCUTANEOUS
Qty: 0 | Refills: 0 | Status: DISCONTINUED | OUTPATIENT
Start: 2017-05-31 | End: 2017-06-05

## 2017-05-31 RX ADMIN — ISOSORBIDE DINITRATE 10 MILLIGRAM(S): 5 TABLET ORAL at 06:16

## 2017-05-31 RX ADMIN — AMLODIPINE BESYLATE 10 MILLIGRAM(S): 2.5 TABLET ORAL at 09:04

## 2017-05-31 RX ADMIN — CARVEDILOL PHOSPHATE 3.12 MILLIGRAM(S): 80 CAPSULE, EXTENDED RELEASE ORAL at 17:13

## 2017-05-31 RX ADMIN — HEPARIN SODIUM 5000 UNIT(S): 5000 INJECTION INTRAVENOUS; SUBCUTANEOUS at 06:16

## 2017-05-31 RX ADMIN — Medication 325 MILLIGRAM(S): at 09:04

## 2017-05-31 RX ADMIN — HEPARIN SODIUM 5000 UNIT(S): 5000 INJECTION INTRAVENOUS; SUBCUTANEOUS at 22:40

## 2017-05-31 RX ADMIN — Medication 81 MILLIGRAM(S): at 17:14

## 2017-05-31 RX ADMIN — Medication 100 MILLIGRAM(S): at 22:40

## 2017-05-31 RX ADMIN — ATORVASTATIN CALCIUM 40 MILLIGRAM(S): 80 TABLET, FILM COATED ORAL at 22:40

## 2017-05-31 RX ADMIN — Medication 325 MILLIGRAM(S): at 17:14

## 2017-05-31 RX ADMIN — Medication 2: at 12:39

## 2017-05-31 RX ADMIN — ISOSORBIDE DINITRATE 20 MILLIGRAM(S): 5 TABLET ORAL at 22:40

## 2017-05-31 NOTE — DISCHARGE NOTE ADULT - PATIENT PORTAL LINK FT
“You can access the FollowHealth Patient Portal, offered by [facility], by registering with the following website: [email or web address]”

## 2017-05-31 NOTE — DISCHARGE NOTE ADULT - NS AS DC HF EDUCATION INSTRUCTIONS
Report weight gain of 2 or more pounds in one day or 3 or more pounds in one week, worsening shortness of breath, fatigue, weakness, increased swelling of hands and feet to primary care provider/Call Primary Care Provider for follow-up after discharge/Low salt diet/Activities as tolerated/Monitor Weight Daily

## 2017-05-31 NOTE — DISCHARGE NOTE ADULT - SECONDARY DIAGNOSIS.
Anemia ESRD (end stage renal disease) on dialysis Essential hypertension Type 2 diabetes mellitus with chronic kidney disease on chronic dialysis, unspecified long term insulin use status

## 2017-05-31 NOTE — DISCHARGE NOTE ADULT - MEDICATION SUMMARY - MEDICATIONS TO TAKE
I will START or STAY ON the medications listed below when I get home from the hospital:    aspirin 81 mg oral tablet, chewable  -- 1 tab(s) by mouth once a day  -- Indication: For Coronary artery disease involving coronary bypass graft of native heart, angina presence unspecified    valsartan 160 mg oral tablet  -- 1 tab(s) by mouth every 12 hours  -- Indication: For Essential hypertension    isosorbide dinitrate 20 mg oral tablet  -- 1 tab(s) by mouth 3 times a day  -- Indication: For Coronary artery disease involving coronary bypass graft of native heart, angina presence unspecified    Januvia 100 mg oral tablet  -- 1 tab(s) by mouth once a day  -- Indication: For Diabetes mellitus    atorvastatin 40 mg oral tablet  -- 1 tab(s) by mouth once a day (at bedtime)  -- Indication: For Hyperlipidemia    carvedilol 3.125 mg oral tablet  -- 1 tab(s) by mouth every 12 hours  -- Indication: For Essential hypertension    petrolatum topical ointment  -- 1 application on skin 2 times a day  -- Indication: For xerosis    hydrALAZINE 100 mg oral tablet  -- 1 tab(s) by mouth 3 times a day  -- Indication: For Essential hypertension    Nephro-Dharmesh oral tablet  -- 1 tab(s) by mouth once a day  -- Indication: For Supplement I will START or STAY ON the medications listed below when I get home from the hospital:    aspirin 81 mg oral tablet, chewable  -- 1 tab(s) by mouth once a day  -- Indication: For Coronary artery disease involving coronary bypass graft of native heart, angina presence unspecified    valsartan 160 mg oral tablet  -- 1 tab(s) by mouth every 12 hours  -- Indication: For Essential hypertension    isosorbide dinitrate 20 mg oral tablet  -- 1 tab(s) by mouth 3 times a day  -- Indication: For Coronary artery disease involving coronary bypass graft of native heart, angina presence unspecified    Januvia 100 mg oral tablet  -- 1 tab(s) by mouth once a day  -- Indication: For Diabetes mellitus    atorvastatin 40 mg oral tablet  -- 1 tab(s) by mouth once a day (at bedtime)  -- Indication: For hyperlipidemia    carvedilol 3.125 mg oral tablet  -- 1 tab(s) by mouth every 12 hours  -- Indication: For Essential hypertension    petrolatum topical ointment  -- 1 application on skin 2 times a day  -- Indication: For xerosis    hydrALAZINE 100 mg oral tablet  -- 1 tab(s) by mouth 3 times a day  -- Indication: For Essential hypertension    Nephro-Dharmesh oral tablet  -- 1 tab(s) by mouth once a day  -- Indication: For Supplement

## 2017-05-31 NOTE — DISCHARGE NOTE ADULT - HOSPITAL COURSE
by attending Pt summary: 45yo male w/ PMH HFrEF (17% 4/26/17), CAD s/p CABG x4 2016, ICM s/p recent AICD 5/2017, DM2 c/b toe ulcers presents with decompensated heart failure and acute renal failure in setting weight gain and recently stopped diuretics for AKI5/31	HD today  6/1	started hydralazine/bumex  6/2	HD today; IR to place permacath on 6/5   	[facility] evlauting for AVF this admisison  6/5	HD completed; permacath today  6/7-	fluid overload, c/w dialysis  6/12:	Pt s/p Hemodialysis today.    06/12 night NP patient with bleeding from rt SC permacath, surgicel ,pressure dressing applied, cbc gradually trending down in last 10 days, stable at >8.0, pt/ptt in range,   if active bleed, vascular consult, heparin sq site on arm with continous oozing of blood, hep sq d/cd, sangita to [facility], spoke to Attending  6/13:	Pt to receive Ultrafiltration today.  During hemodialysis pt is to receive 1 unit of   	PRBC for H/H: 8.9/27.  6/14:	Pt schedulded to receive daily dialysis (Ultrafiltration) until achieve goal   	of euvolemia.   6/15 acute on chronic CHF-ESRD for daily dialysis to removed an additional 20 pound to          fluid. Renal Dr Seth, HF team also following, possible d/c Saturday 6/16:	Seen by Heart Failure Service.  Discontinue toresemide.    	Pt possible discharge to outpatient dialysis on Tuesday.   6/17:	Seen by HF service.  Aggressive diurese to take place.    6/19       Acute on chronic CHF- Pt at dialysis now.                Renal : volume overload improving with increased HD/ UF             - for dialysis again today/ UF as tolerated             -Anemia 8.1/ 25.8- epogen with HD  6/20    -Acute on chronic CHF, fluid overload- will probable need about 4 more daily HD               sessions. Daily dialysis for now.    6/21   CHF/ fluid overload- dialysis today at 4 pm  6/22   acute on chronic CHF-ESRD for daily dialysis to remove  additional fluid            Left AV fistular site- vascular removed sutures  6/23   - acute on chronic CHF EF 17%/AICD-ESRD for daily dialysis continued,                  possible d/c tomorrow, [place] [facility] is set up outpatient,                 Heart failure team: Valsartan increased           -Anemia 10/29.8 stable  6/24 As per Renal, patient should be ready for discharge early next week  	Lower extremity edema improved.  6/24 Night: consider psych consult for traumatic event in hospital while in dialysis other night - ambien x 1 ordered   6/26  CHF/ fluid overload- dialysis today , discharge planning  6/27  ESRD/ HD, CAD/ cardiomyopathy- volume overload significantly improved w/ UF                 discharge today w/ outpatient HD tonight , continue epogen with HD      Dx: Decompensated HF         GIN

## 2017-05-31 NOTE — DISCHARGE NOTE ADULT - NS AS DC FOLLOWUP STROKE INST
Influenza vaccination (VIS Pub Date: August 19, 2014)/Smoking Cessation/Heart Failure Heart Failure/Smoking Cessation

## 2017-05-31 NOTE — DISCHARGE NOTE ADULT - MEDICATION SUMMARY - MEDICATIONS TO STOP TAKING
I will STOP taking the medications listed below when I get home from the hospital:    Keflex 250 mg oral capsule  -- 1 cap(s) by mouth every 12 hours stop after last dose on May 5th    isosorbide dinitrate 10 mg oral tablet  -- 1 tab(s) by mouth 3 times a day    amLODIPine 10 mg oral tablet  -- 1 tab(s) by mouth once a day    Keflex 250 mg oral capsule  -- 1 cap(s) by mouth every 12 hours stop after last dose on May 5th    ferrous sulfate 325 mg (65 mg elemental iron) oral tablet  -- 1 tab(s) by mouth 2 times a day    hydrALAZINE 25 mg oral tablet  -- 3 tab(s) by mouth 3 times a day

## 2017-05-31 NOTE — DISCHARGE NOTE ADULT - PLAN OF CARE
no fluid overload To follow up with heart failure team in am  Daily weight, if increase by 3 pounds, then call cardiologist  return to emergency for difficulty breathing, chest pain, dizziness no decrease in hemoglobin To follow with Nephrologist   and transfuse during dialysis as needed  Epogen and Iron IV during dialysis [facility]  Plan as above /80 continue the above medications HA1C< 7 continue medications as ordered  Follow up with your physician To follow with Nephrologist   and transfuse during dialysis as needed  Epogen and Iron IV during dialysis  strick low salt diet continue medications as ordered  Follow up with your physician  diabetic diet

## 2017-05-31 NOTE — DISCHARGE NOTE ADULT - CARE PROVIDER_API CALL
Steve Seth), Internal Medicine; Nephrology  [address]  [place] [address]  Phone: [phone]  Fax: [phone]    Edinson Lakhani (MD; MPH), Adv Heart Fail Trnsplnt Cardio; Cardiology; Internal Medicine  [address] [place] [id number]  Phone: [phone]  Fax: [phone]

## 2017-05-31 NOTE — DISCHARGE NOTE ADULT - CARE PLAN
Principal Discharge DX:	Acute on chronic combined systolic and diastolic heart failure  Goal:	no fluid overload  Instructions for follow-up, activity and diet:	To follow up with heart failure team in am  Daily weight, if increase by 3 pounds, then call cardiologist  return to emergency for difficulty breathing, chest pain, dizziness  Secondary Diagnosis:	Anemia  Goal:	no decrease in hemoglobin  Instructions for follow-up, activity and diet:	To follow with Nephrologist   and transfuse during dialysis as needed  Epogen and Iron IV during dialysis  Secondary Diagnosis:	ESRD (end stage renal disease) on dialysis  Goal:	no fluid overload  Instructions for follow-up, activity and diet:	[facility]  Plan as above  Secondary Diagnosis:	Essential hypertension  Goal:	/80  Instructions for follow-up, activity and diet:	continue the above medications  Secondary Diagnosis:	Type 2 diabetes mellitus with chronic kidney disease on chronic dialysis, unspecified long term insulin use status  Goal:	HA1C< 7  Instructions for follow-up, activity and diet:	continue medications as ordered  Follow up with your physician Principal Discharge DX:	Acute on chronic combined systolic and diastolic heart failure  Goal:	no fluid overload  Instructions for follow-up, activity and diet:	To follow up with heart failure team in am  Daily weight, if increase by 3 pounds, then call cardiologist  return to emergency for difficulty breathing, chest pain, dizziness  Secondary Diagnosis:	Anemia  Goal:	no decrease in hemoglobin  Instructions for follow-up, activity and diet:	To follow with Nephrologist   and transfuse during dialysis as needed  Epogen and Iron IV during dialysis  strick low salt diet  Secondary Diagnosis:	ESRD (end stage renal disease) on dialysis  Goal:	no fluid overload  Instructions for follow-up, activity and diet:	[facility]  Plan as above  Secondary Diagnosis:	Essential hypertension  Goal:	/80  Instructions for follow-up, activity and diet:	continue the above medications  Secondary Diagnosis:	Type 2 diabetes mellitus with chronic kidney disease on chronic dialysis, unspecified long term insulin use status  Goal:	HA1C< 7  Instructions for follow-up, activity and diet:	continue medications as ordered  Follow up with your physician  diabetic diet Principal Discharge DX:	Acute on chronic combined systolic and diastolic heart failure  Goal:	no fluid overload  Instructions for follow-up, activity and diet:	To follow up with heart failure team in am  Daily weight, if increase by 3 pounds, then call cardiologist  return to emergency for difficulty breathing, chest pain, dizziness  Secondary Diagnosis:	Anemia  Goal:	no decrease in hemoglobin  Instructions for follow-up, activity and diet:	To follow with Nephrologist   and transfuse during dialysis as needed  Epogen and Iron IV during dialysis  strick low salt diet  Secondary Diagnosis:	ESRD (end stage renal disease) on dialysis  Goal:	no fluid overload  Instructions for follow-up, activity and diet:	[facility]  Plan as above  Secondary Diagnosis:	Essential hypertension  Goal:	/80  Instructions for follow-up, activity and diet:	continue the above medications  Secondary Diagnosis:	Type 2 diabetes mellitus with chronic kidney disease on chronic dialysis, unspecified long term insulin use status  Goal:	HA1C< 7  Instructions for follow-up, activity and diet:	continue medications as ordered  Follow up with your physician  diabetic diet Principal Discharge DX:	Acute on chronic combined systolic and diastolic heart failure  Goal:	no fluid overload  Instructions for follow-up, activity and diet:	To follow up with heart failure team in am  Daily weight, if increase by 3 pounds, then call cardiologist  return to emergency for difficulty breathing, chest pain, dizziness  Secondary Diagnosis:	Anemia  Goal:	no decrease in hemoglobin  Instructions for follow-up, activity and diet:	To follow with Nephrologist   and transfuse during dialysis as needed  Epogen and Iron IV during dialysis  strick low salt diet  Secondary Diagnosis:	ESRD (end stage renal disease) on dialysis  Goal:	no fluid overload  Instructions for follow-up, activity and diet:	[facility]  Plan as above  Secondary Diagnosis:	Essential hypertension  Goal:	/80  Instructions for follow-up, activity and diet:	continue the above medications  Secondary Diagnosis:	Type 2 diabetes mellitus with chronic kidney disease on chronic dialysis, unspecified long term insulin use status  Goal:	HA1C< 7  Instructions for follow-up, activity and diet:	continue medications as ordered  Follow up with your physician  diabetic diet Principal Discharge DX:	Acute on chronic combined systolic and diastolic heart failure  Goal:	no fluid overload  Instructions for follow-up, activity and diet:	To follow up with heart failure team in am  Daily weight, if increase by 3 pounds, then call cardiologist  return to emergency for difficulty breathing, chest pain, dizziness  Secondary Diagnosis:	Anemia  Goal:	no decrease in hemoglobin  Instructions for follow-up, activity and diet:	To follow with Nephrologist   and transfuse during dialysis as needed  Epogen and Iron IV during dialysis  strick low salt diet  Secondary Diagnosis:	ESRD (end stage renal disease) on dialysis  Goal:	no fluid overload  Instructions for follow-up, activity and diet:	[facility]  Plan as above  Secondary Diagnosis:	Essential hypertension  Goal:	/80  Instructions for follow-up, activity and diet:	continue the above medications  Secondary Diagnosis:	Type 2 diabetes mellitus with chronic kidney disease on chronic dialysis, unspecified long term insulin use status  Goal:	HA1C< 7  Instructions for follow-up, activity and diet:	continue medications as ordered  Follow up with your physician  diabetic diet Principal Discharge DX:	Acute on chronic combined systolic and diastolic heart failure  Goal:	no fluid overload  Instructions for follow-up, activity and diet:	To follow up with heart failure team in am  Daily weight, if increase by 3 pounds, then call cardiologist  return to emergency for difficulty breathing, chest pain, dizziness  Secondary Diagnosis:	Anemia  Goal:	no decrease in hemoglobin  Instructions for follow-up, activity and diet:	To follow with Nephrologist   and transfuse during dialysis as needed  Epogen and Iron IV during dialysis  strick low salt diet  Secondary Diagnosis:	ESRD (end stage renal disease) on dialysis  Goal:	no fluid overload  Instructions for follow-up, activity and diet:	[facility]  Plan as above  Secondary Diagnosis:	Essential hypertension  Goal:	/80  Instructions for follow-up, activity and diet:	continue the above medications  Secondary Diagnosis:	Type 2 diabetes mellitus with chronic kidney disease on chronic dialysis, unspecified long term insulin use status  Goal:	HA1C< 7  Instructions for follow-up, activity and diet:	continue medications as ordered  Follow up with your physician  diabetic diet Principal Discharge DX:	Acute on chronic combined systolic and diastolic heart failure  Goal:	no fluid overload  Instructions for follow-up, activity and diet:	To follow up with heart failure team in am  Daily weight, if increase by 3 pounds, then call cardiologist  return to emergency for difficulty breathing, chest pain, dizziness  Secondary Diagnosis:	Anemia  Goal:	no decrease in hemoglobin  Instructions for follow-up, activity and diet:	To follow with Nephrologist   and transfuse during dialysis as needed  Epogen and Iron IV during dialysis  strick low salt diet  Secondary Diagnosis:	ESRD (end stage renal disease) on dialysis  Goal:	no fluid overload  Instructions for follow-up, activity and diet:	[facility]  Plan as above  Secondary Diagnosis:	Essential hypertension  Goal:	/80  Instructions for follow-up, activity and diet:	continue the above medications  Secondary Diagnosis:	Type 2 diabetes mellitus with chronic kidney disease on chronic dialysis, unspecified long term insulin use status  Goal:	HA1C< 7  Instructions for follow-up, activity and diet:	continue medications as ordered  Follow up with your physician  diabetic diet

## 2017-06-01 LAB
ANION GAP SERPL CALC-SCNC: 19 MMOL/L — HIGH (ref 5–17)
BUN SERPL-MCNC: 82 MG/DL — HIGH (ref 7–23)
CALCIUM SERPL-MCNC: 7.6 MG/DL — LOW (ref 8.4–10.5)
CHLORIDE SERPL-SCNC: 90 MMOL/L — LOW (ref 96–108)
CO2 SERPL-SCNC: 21 MMOL/L — LOW (ref 22–31)
CREAT SERPL-MCNC: 4.63 MG/DL — HIGH (ref 0.5–1.3)
GLUCOSE SERPL-MCNC: 109 MG/DL — HIGH (ref 70–99)
HCT VFR BLD CALC: 26.4 % — LOW (ref 39–50)
HGB BLD-MCNC: 9.1 G/DL — LOW (ref 13–17)
MCHC RBC-ENTMCNC: 28.4 PG — SIGNIFICANT CHANGE UP (ref 27–34)
MCHC RBC-ENTMCNC: 34.5 GM/DL — SIGNIFICANT CHANGE UP (ref 32–36)
MCV RBC AUTO: 82.5 FL — SIGNIFICANT CHANGE UP (ref 80–100)
PLATELET # BLD AUTO: 140 K/UL — LOW (ref 150–400)
POTASSIUM SERPL-MCNC: 3.8 MMOL/L — SIGNIFICANT CHANGE UP (ref 3.5–5.3)
POTASSIUM SERPL-SCNC: 3.8 MMOL/L — SIGNIFICANT CHANGE UP (ref 3.5–5.3)
RBC # BLD: 3.2 M/UL — LOW (ref 4.2–5.8)
RBC # FLD: 16.9 % — HIGH (ref 10.3–14.5)
SODIUM SERPL-SCNC: 130 MMOL/L — LOW (ref 135–145)
WBC # BLD: 7.41 K/UL — SIGNIFICANT CHANGE UP (ref 3.8–10.5)
WBC # FLD AUTO: 7.41 K/UL — SIGNIFICANT CHANGE UP (ref 3.8–10.5)

## 2017-06-01 PROCEDURE — 99221 1ST HOSP IP/OBS SF/LOW 40: CPT | Mod: GC

## 2017-06-01 PROCEDURE — G9001: CPT

## 2017-06-01 PROCEDURE — 99233 SBSQ HOSP IP/OBS HIGH 50: CPT | Mod: GC

## 2017-06-01 RX ORDER — VALSARTAN 80 MG/1
40 TABLET ORAL
Qty: 0 | Refills: 0 | Status: DISCONTINUED | OUTPATIENT
Start: 2017-06-01 | End: 2017-06-05

## 2017-06-01 RX ADMIN — AMLODIPINE BESYLATE 10 MILLIGRAM(S): 2.5 TABLET ORAL at 12:10

## 2017-06-01 RX ADMIN — ATORVASTATIN CALCIUM 40 MILLIGRAM(S): 80 TABLET, FILM COATED ORAL at 22:31

## 2017-06-01 RX ADMIN — HEPARIN SODIUM 5000 UNIT(S): 5000 INJECTION INTRAVENOUS; SUBCUTANEOUS at 05:29

## 2017-06-01 RX ADMIN — CARVEDILOL PHOSPHATE 3.12 MILLIGRAM(S): 80 CAPSULE, EXTENDED RELEASE ORAL at 17:21

## 2017-06-01 RX ADMIN — Medication 1: at 12:09

## 2017-06-01 RX ADMIN — ISOSORBIDE DINITRATE 20 MILLIGRAM(S): 5 TABLET ORAL at 15:47

## 2017-06-01 RX ADMIN — Medication 100 MILLIGRAM(S): at 15:47

## 2017-06-01 RX ADMIN — ISOSORBIDE DINITRATE 20 MILLIGRAM(S): 5 TABLET ORAL at 07:03

## 2017-06-01 RX ADMIN — ISOSORBIDE DINITRATE 20 MILLIGRAM(S): 5 TABLET ORAL at 22:31

## 2017-06-01 RX ADMIN — HEPARIN SODIUM 5000 UNIT(S): 5000 INJECTION INTRAVENOUS; SUBCUTANEOUS at 22:31

## 2017-06-01 RX ADMIN — HEPARIN SODIUM 5000 UNIT(S): 5000 INJECTION INTRAVENOUS; SUBCUTANEOUS at 13:42

## 2017-06-01 RX ADMIN — CARVEDILOL PHOSPHATE 3.12 MILLIGRAM(S): 80 CAPSULE, EXTENDED RELEASE ORAL at 05:29

## 2017-06-01 RX ADMIN — Medication 100 MILLIGRAM(S): at 22:46

## 2017-06-01 RX ADMIN — VALSARTAN 40 MILLIGRAM(S): 80 TABLET ORAL at 17:21

## 2017-06-01 RX ADMIN — Medication 100 MILLIGRAM(S): at 05:29

## 2017-06-01 RX ADMIN — Medication 325 MILLIGRAM(S): at 17:21

## 2017-06-01 RX ADMIN — Medication 1: at 17:21

## 2017-06-01 RX ADMIN — Medication 325 MILLIGRAM(S): at 08:30

## 2017-06-01 RX ADMIN — Medication 81 MILLIGRAM(S): at 12:10

## 2017-06-01 NOTE — DIETITIAN INITIAL EVALUATION ADULT. - NS AS NUTRI INTERV ED CONTENT3
Educated pt on Renal HD diet. Identified food sources high in sodium, potassium and phosphorus. Encouraged pt to consume adequate amount of high biological value proteins, healthy balanced meals, nutrition label reading techniques, not skipping meals especially when going for HD, healthy snack options and cooking techniques discussed.

## 2017-06-01 NOTE — DIETITIAN INITIAL EVALUATION ADULT. - SOURCE
patient/Medical records, mother at bedside, previous RD notes, pt discussed during rounds this AM; Per chart, pt speaks Urdu,  phone not necessary as pt speaks English well

## 2017-06-01 NOTE — DIETITIAN INITIAL EVALUATION ADULT. - ORAL INTAKE PTA
good/Pt reports appetite and po intakes 'have been the same', declined to provide usual diet recall and states 'I eat my usual everyday'

## 2017-06-01 NOTE — DIETITIAN INITIAL EVALUATION ADULT. - OTHER INFO
Nutrition consult received for HgbA1c 6.4%, heart failure and ESRD on HD. Pt with limited interest in interview at time of visit, acknowledge unintended wt gain but not elaborating on how much wt has increased in what time frame. Per H&P, pt reported 20 pounds wt gain in 3 weeks. However, noted per previous RD note pt wt was 204 pounds and current dosing wt is 254 pounds. Suspect fluid for wt increase as pt denies any changes in po intakes PTA. Pt reports poor po intakes and appetite, flowsheet reviewed. Pt denies any GI distress, +BM 2 days ago. Pt denies chewing/swallowing difficulties. NKFA. PTA takes iron supplements. Pt with T2DM, used to take Januvia but has stopped recently. Pt monitors fingersticks regularly and had multiple exposure to Consistent Carbohydrate Heart Failure diet education in the past. Pt with ESRD on HD for the first time this admission, request RD to provide HD diet education.

## 2017-06-01 NOTE — DIETITIAN INITIAL EVALUATION ADULT. - PERTINENT LABORATORY DATA
(6/1) Sodium 1309, BUN 82, Creatinine 4.63, Glucose 109, GFR 14, Phos 7.3; HgbA1c 6.4% (4/27); Fingersticks (5/30-6/1)

## 2017-06-01 NOTE — DIETITIAN INITIAL EVALUATION ADULT. - ENERGY NEEDS
Height: 69 inches, Weight: 254 pounds  BMI: 37.5 kg/m2 IBW: 160 pounds (+/-10%), %IBW:159%  Pertinent Info: Per chart, 47 y/o male admitted with acute heart failures and renal failure requiring HD, plan for AV fistula and permacath placement, s/p HD yesterday. +2 generalized and +3 bilateral legs edema, no pressure ulcers noted at this time.

## 2017-06-02 LAB
ANION GAP SERPL CALC-SCNC: 18 MMOL/L — HIGH (ref 5–17)
BUN SERPL-MCNC: 95 MG/DL — HIGH (ref 7–23)
CALCIUM SERPL-MCNC: 7.4 MG/DL — LOW (ref 8.4–10.5)
CHLORIDE SERPL-SCNC: 91 MMOL/L — LOW (ref 96–108)
CO2 SERPL-SCNC: 21 MMOL/L — LOW (ref 22–31)
CREAT SERPL-MCNC: 4.86 MG/DL — HIGH (ref 0.5–1.3)
GLUCOSE SERPL-MCNC: 95 MG/DL — SIGNIFICANT CHANGE UP (ref 70–99)
HCT VFR BLD CALC: 25.1 % — LOW (ref 39–50)
HGB BLD-MCNC: 8.7 G/DL — LOW (ref 13–17)
MCHC RBC-ENTMCNC: 28.8 PG — SIGNIFICANT CHANGE UP (ref 27–34)
MCHC RBC-ENTMCNC: 34.7 GM/DL — SIGNIFICANT CHANGE UP (ref 32–36)
MCV RBC AUTO: 83.1 FL — SIGNIFICANT CHANGE UP (ref 80–100)
PLATELET # BLD AUTO: 134 K/UL — LOW (ref 150–400)
POTASSIUM SERPL-MCNC: 4.1 MMOL/L — SIGNIFICANT CHANGE UP (ref 3.5–5.3)
POTASSIUM SERPL-SCNC: 4.1 MMOL/L — SIGNIFICANT CHANGE UP (ref 3.5–5.3)
RBC # BLD: 3.02 M/UL — LOW (ref 4.2–5.8)
RBC # FLD: 16.8 % — HIGH (ref 10.3–14.5)
SODIUM SERPL-SCNC: 130 MMOL/L — LOW (ref 135–145)
WBC # BLD: 8.54 K/UL — SIGNIFICANT CHANGE UP (ref 3.8–10.5)
WBC # FLD AUTO: 8.54 K/UL — SIGNIFICANT CHANGE UP (ref 3.8–10.5)

## 2017-06-02 PROCEDURE — 99232 SBSQ HOSP IP/OBS MODERATE 35: CPT

## 2017-06-02 RX ADMIN — Medication 100 MILLIGRAM(S): at 05:37

## 2017-06-02 RX ADMIN — Medication 60 MILLIGRAM(S): at 05:38

## 2017-06-02 RX ADMIN — Medication 1: at 17:13

## 2017-06-02 RX ADMIN — HEPARIN SODIUM 5000 UNIT(S): 5000 INJECTION INTRAVENOUS; SUBCUTANEOUS at 14:36

## 2017-06-02 RX ADMIN — AMLODIPINE BESYLATE 10 MILLIGRAM(S): 2.5 TABLET ORAL at 11:47

## 2017-06-02 RX ADMIN — ISOSORBIDE DINITRATE 20 MILLIGRAM(S): 5 TABLET ORAL at 05:38

## 2017-06-02 RX ADMIN — Medication 81 MILLIGRAM(S): at 11:47

## 2017-06-02 RX ADMIN — Medication 325 MILLIGRAM(S): at 17:15

## 2017-06-02 RX ADMIN — ISOSORBIDE DINITRATE 20 MILLIGRAM(S): 5 TABLET ORAL at 23:39

## 2017-06-02 RX ADMIN — CARVEDILOL PHOSPHATE 3.12 MILLIGRAM(S): 80 CAPSULE, EXTENDED RELEASE ORAL at 05:38

## 2017-06-02 RX ADMIN — VALSARTAN 40 MILLIGRAM(S): 80 TABLET ORAL at 05:38

## 2017-06-02 RX ADMIN — HEPARIN SODIUM 5000 UNIT(S): 5000 INJECTION INTRAVENOUS; SUBCUTANEOUS at 23:40

## 2017-06-02 RX ADMIN — HEPARIN SODIUM 5000 UNIT(S): 5000 INJECTION INTRAVENOUS; SUBCUTANEOUS at 05:37

## 2017-06-02 RX ADMIN — Medication 100 MILLIGRAM(S): at 23:40

## 2017-06-02 RX ADMIN — CARVEDILOL PHOSPHATE 3.12 MILLIGRAM(S): 80 CAPSULE, EXTENDED RELEASE ORAL at 23:39

## 2017-06-02 RX ADMIN — Medication 325 MILLIGRAM(S): at 08:14

## 2017-06-02 RX ADMIN — ATORVASTATIN CALCIUM 40 MILLIGRAM(S): 80 TABLET, FILM COATED ORAL at 23:40

## 2017-06-03 LAB
ANION GAP SERPL CALC-SCNC: 17 MMOL/L — SIGNIFICANT CHANGE UP (ref 5–17)
BUN SERPL-MCNC: 69 MG/DL — HIGH (ref 7–23)
CALCIUM SERPL-MCNC: 7.5 MG/DL — LOW (ref 8.4–10.5)
CHLORIDE SERPL-SCNC: 93 MMOL/L — LOW (ref 96–108)
CO2 SERPL-SCNC: 23 MMOL/L — SIGNIFICANT CHANGE UP (ref 22–31)
CREAT SERPL-MCNC: 4.03 MG/DL — HIGH (ref 0.5–1.3)
CULTURE RESULTS: SIGNIFICANT CHANGE UP
GLUCOSE SERPL-MCNC: 96 MG/DL — SIGNIFICANT CHANGE UP (ref 70–99)
HCT VFR BLD CALC: 26.3 % — LOW (ref 39–50)
HGB BLD-MCNC: 8.7 G/DL — LOW (ref 13–17)
MCHC RBC-ENTMCNC: 28 PG — SIGNIFICANT CHANGE UP (ref 27–34)
MCHC RBC-ENTMCNC: 33.1 GM/DL — SIGNIFICANT CHANGE UP (ref 32–36)
MCV RBC AUTO: 84.6 FL — SIGNIFICANT CHANGE UP (ref 80–100)
PLATELET # BLD AUTO: 151 K/UL — SIGNIFICANT CHANGE UP (ref 150–400)
POTASSIUM SERPL-MCNC: 3.6 MMOL/L — SIGNIFICANT CHANGE UP (ref 3.5–5.3)
POTASSIUM SERPL-SCNC: 3.6 MMOL/L — SIGNIFICANT CHANGE UP (ref 3.5–5.3)
RBC # BLD: 3.11 M/UL — LOW (ref 4.2–5.8)
RBC # FLD: 16.8 % — HIGH (ref 10.3–14.5)
SODIUM SERPL-SCNC: 133 MMOL/L — LOW (ref 135–145)
SPECIMEN SOURCE: SIGNIFICANT CHANGE UP
WBC # BLD: 7 K/UL — SIGNIFICANT CHANGE UP (ref 3.8–10.5)
WBC # FLD AUTO: 7 K/UL — SIGNIFICANT CHANGE UP (ref 3.8–10.5)

## 2017-06-03 RX ORDER — PETROLATUM,WHITE
1 JELLY (GRAM) TOPICAL
Qty: 0 | Refills: 0 | Status: DISCONTINUED | OUTPATIENT
Start: 2017-06-03 | End: 2017-06-05

## 2017-06-03 RX ORDER — CALCIUM ACETATE 667 MG
667 TABLET ORAL
Qty: 0 | Refills: 0 | Status: DISCONTINUED | OUTPATIENT
Start: 2017-06-03 | End: 2017-06-05

## 2017-06-03 RX ADMIN — CARVEDILOL PHOSPHATE 3.12 MILLIGRAM(S): 80 CAPSULE, EXTENDED RELEASE ORAL at 08:24

## 2017-06-03 RX ADMIN — Medication 100 MILLIGRAM(S): at 15:03

## 2017-06-03 RX ADMIN — AMLODIPINE BESYLATE 10 MILLIGRAM(S): 2.5 TABLET ORAL at 12:41

## 2017-06-03 RX ADMIN — VALSARTAN 40 MILLIGRAM(S): 80 TABLET ORAL at 00:37

## 2017-06-03 RX ADMIN — Medication 100 MILLIGRAM(S): at 22:37

## 2017-06-03 RX ADMIN — Medication 325 MILLIGRAM(S): at 08:24

## 2017-06-03 RX ADMIN — ISOSORBIDE DINITRATE 20 MILLIGRAM(S): 5 TABLET ORAL at 16:04

## 2017-06-03 RX ADMIN — Medication 667 MILLIGRAM(S): at 08:27

## 2017-06-03 RX ADMIN — HEPARIN SODIUM 5000 UNIT(S): 5000 INJECTION INTRAVENOUS; SUBCUTANEOUS at 22:38

## 2017-06-03 RX ADMIN — Medication 667 MILLIGRAM(S): at 12:42

## 2017-06-03 RX ADMIN — HEPARIN SODIUM 5000 UNIT(S): 5000 INJECTION INTRAVENOUS; SUBCUTANEOUS at 15:02

## 2017-06-03 RX ADMIN — Medication 1 APPLICATION(S): at 23:21

## 2017-06-03 RX ADMIN — Medication 2: at 18:38

## 2017-06-03 RX ADMIN — Medication 667 MILLIGRAM(S): at 18:38

## 2017-06-03 RX ADMIN — CARVEDILOL PHOSPHATE 3.12 MILLIGRAM(S): 80 CAPSULE, EXTENDED RELEASE ORAL at 20:53

## 2017-06-03 RX ADMIN — ISOSORBIDE DINITRATE 20 MILLIGRAM(S): 5 TABLET ORAL at 22:37

## 2017-06-03 RX ADMIN — HEPARIN SODIUM 5000 UNIT(S): 5000 INJECTION INTRAVENOUS; SUBCUTANEOUS at 06:07

## 2017-06-03 RX ADMIN — Medication 81 MILLIGRAM(S): at 12:41

## 2017-06-03 RX ADMIN — VALSARTAN 40 MILLIGRAM(S): 80 TABLET ORAL at 10:01

## 2017-06-03 RX ADMIN — VALSARTAN 40 MILLIGRAM(S): 80 TABLET ORAL at 23:21

## 2017-06-03 RX ADMIN — Medication 100 MILLIGRAM(S): at 06:08

## 2017-06-03 RX ADMIN — Medication 325 MILLIGRAM(S): at 18:40

## 2017-06-03 RX ADMIN — ATORVASTATIN CALCIUM 40 MILLIGRAM(S): 80 TABLET, FILM COATED ORAL at 21:42

## 2017-06-03 RX ADMIN — ISOSORBIDE DINITRATE 20 MILLIGRAM(S): 5 TABLET ORAL at 06:08

## 2017-06-03 RX ADMIN — Medication 1 CAPSULE(S): at 12:42

## 2017-06-03 RX ADMIN — Medication 60 MILLIGRAM(S): at 06:08

## 2017-06-04 LAB
ANION GAP SERPL CALC-SCNC: 20 MMOL/L — HIGH (ref 5–17)
BUN SERPL-MCNC: 77 MG/DL — HIGH (ref 7–23)
CALCIUM SERPL-MCNC: 8 MG/DL — LOW (ref 8.4–10.5)
CHLORIDE SERPL-SCNC: 93 MMOL/L — LOW (ref 96–108)
CO2 SERPL-SCNC: 19 MMOL/L — LOW (ref 22–31)
CREAT SERPL-MCNC: 4.66 MG/DL — HIGH (ref 0.5–1.3)
FERRITIN SERPL-MCNC: 239 NG/ML — SIGNIFICANT CHANGE UP (ref 30–400)
GLUCOSE SERPL-MCNC: 96 MG/DL — SIGNIFICANT CHANGE UP (ref 70–99)
HCT VFR BLD CALC: 26.9 % — LOW (ref 39–50)
HGB BLD-MCNC: 8.6 G/DL — LOW (ref 13–17)
IRON SATN MFR SERPL: 28 % — SIGNIFICANT CHANGE UP (ref 16–55)
IRON SATN MFR SERPL: 62 UG/DL — SIGNIFICANT CHANGE UP (ref 45–165)
MCHC RBC-ENTMCNC: 27.8 PG — SIGNIFICANT CHANGE UP (ref 27–34)
MCHC RBC-ENTMCNC: 32 GM/DL — SIGNIFICANT CHANGE UP (ref 32–36)
MCV RBC AUTO: 87.1 FL — SIGNIFICANT CHANGE UP (ref 80–100)
PLATELET # BLD AUTO: 159 K/UL — SIGNIFICANT CHANGE UP (ref 150–400)
POTASSIUM SERPL-MCNC: 4.2 MMOL/L — SIGNIFICANT CHANGE UP (ref 3.5–5.3)
POTASSIUM SERPL-SCNC: 4.2 MMOL/L — SIGNIFICANT CHANGE UP (ref 3.5–5.3)
RBC # BLD: 3.09 M/UL — LOW (ref 4.2–5.8)
RBC # FLD: 16.8 % — HIGH (ref 10.3–14.5)
SODIUM SERPL-SCNC: 132 MMOL/L — LOW (ref 135–145)
TIBC SERPL-MCNC: 219 UG/DL — LOW (ref 220–430)
UIBC SERPL-MCNC: 157 UG/DL — SIGNIFICANT CHANGE UP (ref 110–370)
WBC # BLD: 7.02 K/UL — SIGNIFICANT CHANGE UP (ref 3.8–10.5)
WBC # FLD AUTO: 7.02 K/UL — SIGNIFICANT CHANGE UP (ref 3.8–10.5)

## 2017-06-04 RX ADMIN — HEPARIN SODIUM 5000 UNIT(S): 5000 INJECTION INTRAVENOUS; SUBCUTANEOUS at 22:12

## 2017-06-04 RX ADMIN — Medication 60 MILLIGRAM(S): at 06:08

## 2017-06-04 RX ADMIN — Medication 325 MILLIGRAM(S): at 17:23

## 2017-06-04 RX ADMIN — HEPARIN SODIUM 5000 UNIT(S): 5000 INJECTION INTRAVENOUS; SUBCUTANEOUS at 06:08

## 2017-06-04 RX ADMIN — Medication 100 MILLIGRAM(S): at 08:32

## 2017-06-04 RX ADMIN — ISOSORBIDE DINITRATE 20 MILLIGRAM(S): 5 TABLET ORAL at 22:13

## 2017-06-04 RX ADMIN — Medication 1 APPLICATION(S): at 12:13

## 2017-06-04 RX ADMIN — HEPARIN SODIUM 5000 UNIT(S): 5000 INJECTION INTRAVENOUS; SUBCUTANEOUS at 13:07

## 2017-06-04 RX ADMIN — VALSARTAN 40 MILLIGRAM(S): 80 TABLET ORAL at 17:23

## 2017-06-04 RX ADMIN — ISOSORBIDE DINITRATE 20 MILLIGRAM(S): 5 TABLET ORAL at 06:09

## 2017-06-04 RX ADMIN — Medication 667 MILLIGRAM(S): at 17:23

## 2017-06-04 RX ADMIN — Medication 325 MILLIGRAM(S): at 08:31

## 2017-06-04 RX ADMIN — Medication 100 MILLIGRAM(S): at 13:06

## 2017-06-04 RX ADMIN — Medication 1 CAPSULE(S): at 12:13

## 2017-06-04 RX ADMIN — ATORVASTATIN CALCIUM 40 MILLIGRAM(S): 80 TABLET, FILM COATED ORAL at 22:12

## 2017-06-04 RX ADMIN — Medication 81 MILLIGRAM(S): at 12:13

## 2017-06-04 RX ADMIN — ISOSORBIDE DINITRATE 20 MILLIGRAM(S): 5 TABLET ORAL at 13:06

## 2017-06-04 RX ADMIN — Medication 667 MILLIGRAM(S): at 08:32

## 2017-06-04 RX ADMIN — Medication 100 MILLIGRAM(S): at 22:13

## 2017-06-04 RX ADMIN — Medication 1 APPLICATION(S): at 22:12

## 2017-06-04 RX ADMIN — CARVEDILOL PHOSPHATE 3.12 MILLIGRAM(S): 80 CAPSULE, EXTENDED RELEASE ORAL at 17:23

## 2017-06-04 RX ADMIN — CARVEDILOL PHOSPHATE 3.12 MILLIGRAM(S): 80 CAPSULE, EXTENDED RELEASE ORAL at 08:32

## 2017-06-04 RX ADMIN — AMLODIPINE BESYLATE 10 MILLIGRAM(S): 2.5 TABLET ORAL at 12:13

## 2017-06-04 RX ADMIN — Medication 667 MILLIGRAM(S): at 12:13

## 2017-06-04 RX ADMIN — VALSARTAN 40 MILLIGRAM(S): 80 TABLET ORAL at 06:10

## 2017-06-05 LAB
ANION GAP SERPL CALC-SCNC: 20 MMOL/L — HIGH (ref 5–17)
BUN SERPL-MCNC: 85 MG/DL — HIGH (ref 7–23)
CALCIUM SERPL-MCNC: 7.9 MG/DL — LOW (ref 8.4–10.5)
CHLORIDE SERPL-SCNC: 89 MMOL/L — LOW (ref 96–108)
CO2 SERPL-SCNC: 20 MMOL/L — LOW (ref 22–31)
CREAT SERPL-MCNC: 5.1 MG/DL — HIGH (ref 0.5–1.3)
GLUCOSE SERPL-MCNC: 101 MG/DL — HIGH (ref 70–99)
HCT VFR BLD CALC: 25.2 % — LOW (ref 39–50)
HGB BLD-MCNC: 8.4 G/DL — LOW (ref 13–17)
INR BLD: 1.07 RATIO — SIGNIFICANT CHANGE UP (ref 0.88–1.16)
MCHC RBC-ENTMCNC: 28.7 PG — SIGNIFICANT CHANGE UP (ref 27–34)
MCHC RBC-ENTMCNC: 33.3 GM/DL — SIGNIFICANT CHANGE UP (ref 32–36)
MCV RBC AUTO: 86 FL — SIGNIFICANT CHANGE UP (ref 80–100)
PLATELET # BLD AUTO: 162 K/UL — SIGNIFICANT CHANGE UP (ref 150–400)
POTASSIUM SERPL-MCNC: 4.2 MMOL/L — SIGNIFICANT CHANGE UP (ref 3.5–5.3)
POTASSIUM SERPL-SCNC: 4.2 MMOL/L — SIGNIFICANT CHANGE UP (ref 3.5–5.3)
PROTHROM AB SERPL-ACNC: 11.6 SEC — SIGNIFICANT CHANGE UP (ref 9.8–12.7)
RBC # BLD: 2.93 M/UL — LOW (ref 4.2–5.8)
RBC # FLD: 16.9 % — HIGH (ref 10.3–14.5)
SODIUM SERPL-SCNC: 129 MMOL/L — LOW (ref 135–145)
WBC # BLD: 7.68 K/UL — SIGNIFICANT CHANGE UP (ref 3.8–10.5)
WBC # FLD AUTO: 7.68 K/UL — SIGNIFICANT CHANGE UP (ref 3.8–10.5)

## 2017-06-05 PROCEDURE — 36831 OPEN THROMBECT AV FISTULA: CPT

## 2017-06-05 PROCEDURE — 36556 INSERT NON-TUNNEL CV CATH: CPT

## 2017-06-05 PROCEDURE — 36821 AV FUSION DIRECT ANY SITE: CPT | Mod: 59

## 2017-06-05 PROCEDURE — 76937 US GUIDE VASCULAR ACCESS: CPT | Mod: 26

## 2017-06-05 PROCEDURE — 77001 FLUOROGUIDE FOR VEIN DEVICE: CPT | Mod: 26

## 2017-06-05 RX ORDER — ONDANSETRON 8 MG/1
4 TABLET, FILM COATED ORAL ONCE
Qty: 0 | Refills: 0 | Status: DISCONTINUED | OUTPATIENT
Start: 2017-06-05 | End: 2017-06-05

## 2017-06-05 RX ORDER — DEXTROSE 50 % IN WATER 50 %
25 SYRINGE (ML) INTRAVENOUS ONCE
Qty: 0 | Refills: 0 | Status: DISCONTINUED | OUTPATIENT
Start: 2017-06-05 | End: 2017-06-27

## 2017-06-05 RX ORDER — HEPARIN SODIUM 5000 [USP'U]/ML
5000 INJECTION INTRAVENOUS; SUBCUTANEOUS EVERY 8 HOURS
Qty: 0 | Refills: 0 | Status: DISCONTINUED | OUTPATIENT
Start: 2017-06-05 | End: 2017-06-12

## 2017-06-05 RX ORDER — PETROLATUM,WHITE
1 JELLY (GRAM) TOPICAL
Qty: 0 | Refills: 0 | Status: DISCONTINUED | OUTPATIENT
Start: 2017-06-05 | End: 2017-06-27

## 2017-06-05 RX ORDER — VALSARTAN 80 MG/1
40 TABLET ORAL
Qty: 0 | Refills: 0 | Status: DISCONTINUED | OUTPATIENT
Start: 2017-06-05 | End: 2017-06-22

## 2017-06-05 RX ORDER — ATORVASTATIN CALCIUM 80 MG/1
40 TABLET, FILM COATED ORAL AT BEDTIME
Qty: 0 | Refills: 0 | Status: DISCONTINUED | OUTPATIENT
Start: 2017-06-05 | End: 2017-06-27

## 2017-06-05 RX ORDER — HYDROMORPHONE HYDROCHLORIDE 2 MG/ML
0.5 INJECTION INTRAMUSCULAR; INTRAVENOUS; SUBCUTANEOUS
Qty: 0 | Refills: 0 | Status: DISCONTINUED | OUTPATIENT
Start: 2017-06-05 | End: 2017-06-05

## 2017-06-05 RX ORDER — FERROUS SULFATE 325(65) MG
325 TABLET ORAL
Qty: 0 | Refills: 0 | Status: DISCONTINUED | OUTPATIENT
Start: 2017-06-05 | End: 2017-06-27

## 2017-06-05 RX ORDER — AMLODIPINE BESYLATE 2.5 MG/1
10 TABLET ORAL DAILY
Qty: 0 | Refills: 0 | Status: DISCONTINUED | OUTPATIENT
Start: 2017-06-05 | End: 2017-06-08

## 2017-06-05 RX ORDER — ASPIRIN/CALCIUM CARB/MAGNESIUM 324 MG
81 TABLET ORAL DAILY
Qty: 0 | Refills: 0 | Status: DISCONTINUED | OUTPATIENT
Start: 2017-06-05 | End: 2017-06-27

## 2017-06-05 RX ORDER — CALCIUM ACETATE 667 MG
667 TABLET ORAL
Qty: 0 | Refills: 0 | Status: DISCONTINUED | OUTPATIENT
Start: 2017-06-05 | End: 2017-06-26

## 2017-06-05 RX ORDER — GLUCAGON INJECTION, SOLUTION 0.5 MG/.1ML
1 INJECTION, SOLUTION SUBCUTANEOUS ONCE
Qty: 0 | Refills: 0 | Status: DISCONTINUED | OUTPATIENT
Start: 2017-06-05 | End: 2017-06-27

## 2017-06-05 RX ORDER — ACETAMINOPHEN 500 MG
650 TABLET ORAL EVERY 6 HOURS
Qty: 0 | Refills: 0 | Status: DISCONTINUED | OUTPATIENT
Start: 2017-06-05 | End: 2017-06-27

## 2017-06-05 RX ORDER — ISOSORBIDE DINITRATE 5 MG/1
20 TABLET ORAL THREE TIMES A DAY
Qty: 0 | Refills: 0 | Status: DISCONTINUED | OUTPATIENT
Start: 2017-06-05 | End: 2017-06-27

## 2017-06-05 RX ORDER — INSULIN LISPRO 100/ML
VIAL (ML) SUBCUTANEOUS AT BEDTIME
Qty: 0 | Refills: 0 | Status: DISCONTINUED | OUTPATIENT
Start: 2017-06-05 | End: 2017-06-27

## 2017-06-05 RX ORDER — INSULIN LISPRO 100/ML
VIAL (ML) SUBCUTANEOUS
Qty: 0 | Refills: 0 | Status: DISCONTINUED | OUTPATIENT
Start: 2017-06-05 | End: 2017-06-27

## 2017-06-05 RX ORDER — DEXTROSE 50 % IN WATER 50 %
12.5 SYRINGE (ML) INTRAVENOUS ONCE
Qty: 0 | Refills: 0 | Status: DISCONTINUED | OUTPATIENT
Start: 2017-06-05 | End: 2017-06-27

## 2017-06-05 RX ORDER — DEXTROSE 50 % IN WATER 50 %
50 SYRINGE (ML) INTRAVENOUS ONCE
Qty: 0 | Refills: 0 | Status: DISCONTINUED | OUTPATIENT
Start: 2017-06-05 | End: 2017-06-27

## 2017-06-05 RX ORDER — CARVEDILOL PHOSPHATE 80 MG/1
3.12 CAPSULE, EXTENDED RELEASE ORAL EVERY 12 HOURS
Qty: 0 | Refills: 0 | Status: DISCONTINUED | OUTPATIENT
Start: 2017-06-05 | End: 2017-06-27

## 2017-06-05 RX ORDER — HYDRALAZINE HCL 50 MG
100 TABLET ORAL THREE TIMES A DAY
Qty: 0 | Refills: 0 | Status: DISCONTINUED | OUTPATIENT
Start: 2017-06-05 | End: 2017-06-27

## 2017-06-05 RX ORDER — DEXTROSE 50 % IN WATER 50 %
1 SYRINGE (ML) INTRAVENOUS ONCE
Qty: 0 | Refills: 0 | Status: DISCONTINUED | OUTPATIENT
Start: 2017-06-05 | End: 2017-06-27

## 2017-06-05 RX ADMIN — CARVEDILOL PHOSPHATE 3.12 MILLIGRAM(S): 80 CAPSULE, EXTENDED RELEASE ORAL at 05:33

## 2017-06-05 RX ADMIN — HEPARIN SODIUM 5000 UNIT(S): 5000 INJECTION INTRAVENOUS; SUBCUTANEOUS at 22:10

## 2017-06-05 RX ADMIN — ISOSORBIDE DINITRATE 20 MILLIGRAM(S): 5 TABLET ORAL at 05:34

## 2017-06-05 RX ADMIN — Medication 60 MILLIGRAM(S): at 05:33

## 2017-06-05 RX ADMIN — VALSARTAN 40 MILLIGRAM(S): 80 TABLET ORAL at 05:33

## 2017-06-05 RX ADMIN — Medication 100 MILLIGRAM(S): at 05:33

## 2017-06-06 ENCOUNTER — TRANSCRIPTION ENCOUNTER (OUTPATIENT)
Age: 47
End: 2017-06-06

## 2017-06-06 LAB
ANION GAP SERPL CALC-SCNC: 18 MMOL/L — HIGH (ref 5–17)
BUN SERPL-MCNC: 70 MG/DL — HIGH (ref 7–23)
CALCIUM SERPL-MCNC: 8.3 MG/DL — LOW (ref 8.4–10.5)
CHLORIDE SERPL-SCNC: 95 MMOL/L — LOW (ref 96–108)
CO2 SERPL-SCNC: 19 MMOL/L — LOW (ref 22–31)
CREAT SERPL-MCNC: 4.67 MG/DL — HIGH (ref 0.5–1.3)
GLUCOSE SERPL-MCNC: 104 MG/DL — HIGH (ref 70–99)
HCT VFR BLD CALC: 25 % — LOW (ref 39–50)
HGB BLD-MCNC: 8.1 G/DL — LOW (ref 13–17)
MCHC RBC-ENTMCNC: 28.6 PG — SIGNIFICANT CHANGE UP (ref 27–34)
MCHC RBC-ENTMCNC: 32.4 GM/DL — SIGNIFICANT CHANGE UP (ref 32–36)
MCV RBC AUTO: 88.3 FL — SIGNIFICANT CHANGE UP (ref 80–100)
PLATELET # BLD AUTO: 175 K/UL — SIGNIFICANT CHANGE UP (ref 150–400)
POTASSIUM SERPL-MCNC: 4.4 MMOL/L — SIGNIFICANT CHANGE UP (ref 3.5–5.3)
POTASSIUM SERPL-SCNC: 4.4 MMOL/L — SIGNIFICANT CHANGE UP (ref 3.5–5.3)
RBC # BLD: 2.83 M/UL — LOW (ref 4.2–5.8)
RBC # FLD: 16.8 % — HIGH (ref 10.3–14.5)
SODIUM SERPL-SCNC: 132 MMOL/L — LOW (ref 135–145)
WBC # BLD: 6.8 K/UL — SIGNIFICANT CHANGE UP (ref 3.8–10.5)
WBC # FLD AUTO: 6.8 K/UL — SIGNIFICANT CHANGE UP (ref 3.8–10.5)

## 2017-06-06 PROCEDURE — 93970 EXTREMITY STUDY: CPT | Mod: 26

## 2017-06-06 RX ORDER — ERYTHROPOIETIN 10000 [IU]/ML
10000 INJECTION, SOLUTION INTRAVENOUS; SUBCUTANEOUS
Qty: 0 | Refills: 0 | Status: DISCONTINUED | OUTPATIENT
Start: 2017-06-06 | End: 2017-06-27

## 2017-06-06 RX ADMIN — AMLODIPINE BESYLATE 10 MILLIGRAM(S): 2.5 TABLET ORAL at 05:40

## 2017-06-06 RX ADMIN — HEPARIN SODIUM 5000 UNIT(S): 5000 INJECTION INTRAVENOUS; SUBCUTANEOUS at 21:26

## 2017-06-06 RX ADMIN — ISOSORBIDE DINITRATE 20 MILLIGRAM(S): 5 TABLET ORAL at 05:40

## 2017-06-06 RX ADMIN — Medication 667 MILLIGRAM(S): at 12:25

## 2017-06-06 RX ADMIN — Medication 1 APPLICATION(S): at 16:40

## 2017-06-06 RX ADMIN — Medication 1: at 12:25

## 2017-06-06 RX ADMIN — Medication 325 MILLIGRAM(S): at 17:18

## 2017-06-06 RX ADMIN — VALSARTAN 40 MILLIGRAM(S): 80 TABLET ORAL at 05:40

## 2017-06-06 RX ADMIN — VALSARTAN 40 MILLIGRAM(S): 80 TABLET ORAL at 17:18

## 2017-06-06 RX ADMIN — Medication 1 CAPSULE(S): at 12:24

## 2017-06-06 RX ADMIN — Medication 100 MILLIGRAM(S): at 05:40

## 2017-06-06 RX ADMIN — Medication 325 MILLIGRAM(S): at 08:16

## 2017-06-06 RX ADMIN — Medication 1 APPLICATION(S): at 05:40

## 2017-06-06 RX ADMIN — ATORVASTATIN CALCIUM 40 MILLIGRAM(S): 80 TABLET, FILM COATED ORAL at 21:25

## 2017-06-06 RX ADMIN — Medication 100 MILLIGRAM(S): at 21:25

## 2017-06-06 RX ADMIN — HEPARIN SODIUM 5000 UNIT(S): 5000 INJECTION INTRAVENOUS; SUBCUTANEOUS at 05:40

## 2017-06-06 RX ADMIN — Medication 81 MILLIGRAM(S): at 12:24

## 2017-06-06 RX ADMIN — Medication 100 MILLIGRAM(S): at 13:34

## 2017-06-06 RX ADMIN — Medication 667 MILLIGRAM(S): at 08:16

## 2017-06-06 RX ADMIN — CARVEDILOL PHOSPHATE 3.12 MILLIGRAM(S): 80 CAPSULE, EXTENDED RELEASE ORAL at 05:40

## 2017-06-06 RX ADMIN — ISOSORBIDE DINITRATE 20 MILLIGRAM(S): 5 TABLET ORAL at 21:26

## 2017-06-06 RX ADMIN — HEPARIN SODIUM 5000 UNIT(S): 5000 INJECTION INTRAVENOUS; SUBCUTANEOUS at 13:34

## 2017-06-06 RX ADMIN — Medication 60 MILLIGRAM(S): at 05:40

## 2017-06-06 RX ADMIN — ISOSORBIDE DINITRATE 20 MILLIGRAM(S): 5 TABLET ORAL at 13:36

## 2017-06-06 RX ADMIN — Medication 667 MILLIGRAM(S): at 17:18

## 2017-06-06 RX ADMIN — CARVEDILOL PHOSPHATE 3.12 MILLIGRAM(S): 80 CAPSULE, EXTENDED RELEASE ORAL at 17:18

## 2017-06-06 NOTE — PHYSICAL THERAPY INITIAL EVALUATION ADULT - PERTINENT HX OF CURRENT PROBLEM, REHAB EVAL
46yoM, pmhx below. p/w 6 days of progressive swelling, orthopnea, dyspnea, 20lb weight gain since discharged 3 wks ago. Pt is off diuretics since developing GIN last admit. BLE duplex (-). CXR The heart is enlarged. Left pleural effusion. Left lower lobe pneumonia and/or atelectasis. Mild pulmonary vascular congestion. A pacer is in good position. A central line was placed on the right and tip is in superior vena cava. No pneumothorax. CXR 5/29 Small left pleural effusion, unchanged.

## 2017-06-06 NOTE — PHYSICAL THERAPY INITIAL EVALUATION ADULT - DISCHARGE DISPOSITION, PT EVAL
Home with PT for functional/safety assessment, gait/endurance training, general strengthening and fall risk prevention./home w/ home PT

## 2017-06-06 NOTE — PHYSICAL THERAPY INITIAL EVALUATION ADULT - ADDITIONAL COMMENTS
Pt lives with mother in a private house. Pt stays in basement, 7 steps to enter, B handrails present. PTA, pt independent with all ADLs and functional activities with no AD.

## 2017-06-07 DIAGNOSIS — N18.5 CHRONIC KIDNEY DISEASE, STAGE 5: ICD-10-CM

## 2017-06-07 DIAGNOSIS — E11.9 TYPE 2 DIABETES MELLITUS WITHOUT COMPLICATIONS: ICD-10-CM

## 2017-06-07 DIAGNOSIS — I50.43 ACUTE ON CHRONIC COMBINED SYSTOLIC (CONGESTIVE) AND DIASTOLIC (CONGESTIVE) HEART FAILURE: ICD-10-CM

## 2017-06-07 LAB
ANION GAP SERPL CALC-SCNC: 18 MMOL/L — HIGH (ref 5–17)
BUN SERPL-MCNC: 82 MG/DL — HIGH (ref 7–23)
CALCIUM SERPL-MCNC: 8.3 MG/DL — LOW (ref 8.4–10.5)
CHLORIDE SERPL-SCNC: 92 MMOL/L — LOW (ref 96–108)
CO2 SERPL-SCNC: 22 MMOL/L — SIGNIFICANT CHANGE UP (ref 22–31)
CREAT SERPL-MCNC: 5.45 MG/DL — HIGH (ref 0.5–1.3)
GLUCOSE SERPL-MCNC: 116 MG/DL — HIGH (ref 70–99)
HCT VFR BLD CALC: 27.6 % — LOW (ref 39–50)
HGB BLD-MCNC: 8.6 G/DL — LOW (ref 13–17)
MCHC RBC-ENTMCNC: 28.6 PG — SIGNIFICANT CHANGE UP (ref 27–34)
MCHC RBC-ENTMCNC: 31.3 GM/DL — LOW (ref 32–36)
MCV RBC AUTO: 91.5 FL — SIGNIFICANT CHANGE UP (ref 80–100)
PLATELET # BLD AUTO: 157 K/UL — SIGNIFICANT CHANGE UP (ref 150–400)
POTASSIUM SERPL-MCNC: 4.3 MMOL/L — SIGNIFICANT CHANGE UP (ref 3.5–5.3)
POTASSIUM SERPL-SCNC: 4.3 MMOL/L — SIGNIFICANT CHANGE UP (ref 3.5–5.3)
RBC # BLD: 3.01 M/UL — LOW (ref 4.2–5.8)
RBC # FLD: 15.3 % — HIGH (ref 10.3–14.5)
SODIUM SERPL-SCNC: 132 MMOL/L — LOW (ref 135–145)
WBC # BLD: 7.3 K/UL — SIGNIFICANT CHANGE UP (ref 3.8–10.5)
WBC # FLD AUTO: 7.3 K/UL — SIGNIFICANT CHANGE UP (ref 3.8–10.5)

## 2017-06-07 PROCEDURE — 93306 TTE W/DOPPLER COMPLETE: CPT | Mod: 26

## 2017-06-07 RX ADMIN — Medication 1 APPLICATION(S): at 13:15

## 2017-06-07 RX ADMIN — ATORVASTATIN CALCIUM 40 MILLIGRAM(S): 80 TABLET, FILM COATED ORAL at 21:10

## 2017-06-07 RX ADMIN — Medication 667 MILLIGRAM(S): at 09:32

## 2017-06-07 RX ADMIN — Medication 1 APPLICATION(S): at 17:52

## 2017-06-07 RX ADMIN — ISOSORBIDE DINITRATE 20 MILLIGRAM(S): 5 TABLET ORAL at 13:42

## 2017-06-07 RX ADMIN — Medication 100 MILLIGRAM(S): at 13:42

## 2017-06-07 RX ADMIN — Medication 1: at 18:39

## 2017-06-07 RX ADMIN — AMLODIPINE BESYLATE 10 MILLIGRAM(S): 2.5 TABLET ORAL at 13:15

## 2017-06-07 RX ADMIN — ERYTHROPOIETIN 10000 UNIT(S): 10000 INJECTION, SOLUTION INTRAVENOUS; SUBCUTANEOUS at 09:59

## 2017-06-07 RX ADMIN — HEPARIN SODIUM 5000 UNIT(S): 5000 INJECTION INTRAVENOUS; SUBCUTANEOUS at 21:10

## 2017-06-07 RX ADMIN — Medication 1 APPLICATION(S): at 05:37

## 2017-06-07 RX ADMIN — Medication 1 CAPSULE(S): at 17:52

## 2017-06-07 RX ADMIN — VALSARTAN 40 MILLIGRAM(S): 80 TABLET ORAL at 13:16

## 2017-06-07 RX ADMIN — CARVEDILOL PHOSPHATE 3.12 MILLIGRAM(S): 80 CAPSULE, EXTENDED RELEASE ORAL at 13:15

## 2017-06-07 RX ADMIN — Medication 100 MILLIGRAM(S): at 21:10

## 2017-06-07 RX ADMIN — Medication 81 MILLIGRAM(S): at 13:15

## 2017-06-07 RX ADMIN — Medication 60 MILLIGRAM(S): at 17:56

## 2017-06-07 RX ADMIN — HEPARIN SODIUM 5000 UNIT(S): 5000 INJECTION INTRAVENOUS; SUBCUTANEOUS at 05:37

## 2017-06-07 RX ADMIN — Medication 325 MILLIGRAM(S): at 17:52

## 2017-06-07 RX ADMIN — Medication 1: at 13:16

## 2017-06-07 RX ADMIN — ISOSORBIDE DINITRATE 20 MILLIGRAM(S): 5 TABLET ORAL at 21:10

## 2017-06-07 RX ADMIN — Medication 667 MILLIGRAM(S): at 17:52

## 2017-06-07 RX ADMIN — VALSARTAN 40 MILLIGRAM(S): 80 TABLET ORAL at 22:24

## 2017-06-07 RX ADMIN — Medication 667 MILLIGRAM(S): at 13:42

## 2017-06-07 RX ADMIN — CARVEDILOL PHOSPHATE 3.12 MILLIGRAM(S): 80 CAPSULE, EXTENDED RELEASE ORAL at 21:11

## 2017-06-07 NOTE — PROGRESS NOTE ADULT - SUBJECTIVE AND OBJECTIVE BOX
Patient is a 47y old  Male who presents with a chief complaint of acute on chronic heart failure, acute renal failure (31 May 2017 09:48)      SUBJECTIVE / OVERNIGHT EVENTS:  Review of Systems  chest pain no  palpitations no  sob no  nausea no  headache no    MEDICATIONS  (STANDING):  atorvastatin 40milliGRAM(s) Oral at bedtime  amLODIPine   Tablet 10milliGRAM(s) Oral daily  ferrous    sulfate 325milliGRAM(s) Oral two times a day with meals  heparin  Injectable 5000Unit(s) SubCutaneous every 8 hours  dextrose 50% Injectable 12.5Gram(s) IV Push once  dextrose 50% Injectable 25Gram(s) IV Push once  dextrose 50% Injectable 25Gram(s) IV Push once  carvedilol 3.125milliGRAM(s) Oral every 12 hours  dextrose 50% Injectable 50milliLiter(s) IV Push once  aspirin enteric coated 81milliGRAM(s) Oral daily  hydrALAZINE 100milliGRAM(s) Oral three times a day  isosorbide   dinitrate Tablet (ISORDIL) 20milliGRAM(s) Oral three times a day  insulin lispro (HumaLOG) corrective regimen sliding scale  SubCutaneous three times a day before meals  insulin lispro (HumaLOG) corrective regimen sliding scale  SubCutaneous at bedtime  valsartan 40milliGRAM(s) Oral two times a day  torsemide 60milliGRAM(s) Oral daily  calcium acetate 667milliGRAM(s) Oral three times a day with meals  Nephrocaps 1Capsule(s) Oral daily  AQUAPHOR (petrolatum Ointment) 1Application(s) Topical two times a day  epoetin arian Injectable [id number](s) IV Push <User Schedule>    MEDICATIONS  (PRN):  acetaminophen   Tablet 650milliGRAM(s) Oral every 6 hours PRN For Temp greater than 38.5 C (101.3 F)  acetaminophen   Tablet. 650milliGRAM(s) Oral every 6 hours PRN mild-moderate pain  dextrose Gel 1Dose(s) Oral once PRN Blood Glucose LESS THAN 70 milliGRAM(s)/deciliter  glucagon  Injectable 1milliGRAM(s) IntraMuscular once PRN Glucose LESS THAN 70 milligrams/deciliter      Vital Signs Last 24 Hrs  T(C): 37.1, Max: 37.1 (06-07 @ 12:53)  HR: 86 (86 - 98)  BP: 128/69 (117/72 - 141/70)  RR: 18 (18 - 19)  SpO2: 93% (92% - 96%)  Wt(kg): --  CAPILLARY BLOOD GLUCOSE  173 (07 Jun 2017 18:09)  165 (07 Jun 2017 12:53)  105 (07 Jun 2017 09:10)  181 (06 Jun 2017 21:33)    I&O's Summary  I & Os for 24h ending 07 Jun 2017 07:00  =============================================  IN: 1100 ml / OUT: 650 ml / NET: 450 ml    I & Os for current day (as of 07 Jun 2017 19:26)  =============================================  IN: 240 ml / OUT: 2200 ml / NET: -1960 ml      PHYSICAL EXAM:  GENERAL: NAD, well-developed  HEAD:  Atraumatic, Normocephalic  EYES: EOMI, PERRLA, conjunctiva and sclera clear  NECK: Supple, No JVD  CHEST/LUNG: Clear to auscultation bilaterally; No wheeze  HEART: Regular rate and rhythm; No murmurs, rubs, or gallops  ABDOMEN: Soft, Nontender, Nondistended; Bowel sounds present  EXTREMITIES:  2+ Peripheral Pulses, No clubbing, cyanosis, or edema  PSYCH: AAOx3  NEUROLOGY: non-focal  SKIN: No rashes or lesions    LABS:                        8.6    7.3   )-----------( 157      ( 07 Jun 2017 06:46 )             27.6     06-07    132<L>  |  92<L>  |  82<H>  ----------------------------<  116<H>  4.3   |  22  |  5.45<H>    Ca    8.3<L>      07 Jun 2017 06:46                RADIOLOGY & ADDITIONAL TESTS:    Imaging Personally Reviewed:    Consultant(s) Notes Reviewed:      Care Discussed with Consultants/Other Providers:

## 2017-06-07 NOTE — PROGRESS NOTE ADULT - SUBJECTIVE AND OBJECTIVE BOX
24 hour events:  tolerated AV fistula well, no acute events    Review Of Systems:  Constitutional: [ ] Fever [ ] Chills [ ] Fatigue [ ] Weight change   HEENT: [ ] Blurred vision [ ] Eye Pain [ ] Headache [ ] Runny nose [ ] Sore Throat   Respiratory: [ ] Cough [ ] Wheezing [ ] Shortness of breath  Cardiovascular: [ ] Chest Pain [ ] Palpitations [ ] BRUCE [ ] PND [ ] Orthopnea  Gastrointestinal: [ ] Abdominal Pain [ ] Diarrhea [ ] Constipation [ ] Hemorrhoids [ ] Nausea [ ] Vomiting  Genitourinary: [ ] Nocturia [ ] Dysuria [ ] Incontinence  Extremities: [ ] Swelling [ ] Joint Pain  Neurologic: [ ] Focal deficit [ ] Paresthesias [ ] Syncope  Lymphatic: [ ] Swelling [ ] Lymphadenopathy   Skin: [ ] Rash [ ] Ecchymoses [ ] Wounds [ ] Lesions  Psychiatry: [ ] Depression [ ] Suicidal/Homicidal Ideation [ ] Anxiety [ ] Sleep Disturbances  [x 10 point review of systems is otherwise negative except as mentioned above            [ ]Unable to obtain    Medications:  atorvastatin 40milliGRAM(s) Oral at bedtime  amLODIPine   Tablet 10milliGRAM(s) Oral daily  ferrous    sulfate 325milliGRAM(s) Oral two times a day with meals  heparin  Injectable 5000Unit(s) SubCutaneous every 8 hours  acetaminophen   Tablet 650milliGRAM(s) Oral every 6 hours PRN  acetaminophen   Tablet. 650milliGRAM(s) Oral every 6 hours PRN  dextrose Gel 1Dose(s) Oral once PRN  dextrose 50% Injectable 12.5Gram(s) IV Push once  dextrose 50% Injectable 25Gram(s) IV Push once  dextrose 50% Injectable 25Gram(s) IV Push once  glucagon  Injectable 1milliGRAM(s) IntraMuscular once PRN  carvedilol 3.125milliGRAM(s) Oral every 12 hours  dextrose 50% Injectable 50milliLiter(s) IV Push once  aspirin enteric coated 81milliGRAM(s) Oral daily  hydrALAZINE 100milliGRAM(s) Oral three times a day  isosorbide   dinitrate Tablet (ISORDIL) 20milliGRAM(s) Oral three times a day  insulin lispro (HumaLOG) corrective regimen sliding scale  SubCutaneous three times a day before meals  insulin lispro (HumaLOG) corrective regimen sliding scale  SubCutaneous at bedtime  valsartan 40milliGRAM(s) Oral two times a day  torsemide 60milliGRAM(s) Oral daily  calcium acetate 667milliGRAM(s) Oral three times a day with meals  Nephrocaps 1Capsule(s) Oral daily  AQUAPHOR (petrolatum Ointment) 1Application(s) Topical two times a day  epoetin arian Injectable 84222Kttk(s) IV Push <User Schedule>    Vitals:  T(C): 36.4, Max: 36.7 ( @ 04:00)  HR: 90 (83 - 92)  BP: 140/74 (115/70 - 141/70)  BP(mean): --  ABP: --  ABP(mean): --  RR: 18 (16 - 19)  SpO2: 95% (91% - 96%)  Wt(kg): --        Daily     Daily Weight in k.2 (2017 08:10)    I&O's Summary    I & Os for current day (as of 2017 12:15)  =============================================  IN: 1100 ml / OUT: 650 ml / NET: 450 ml      Physical Exam:  Appearance: [x ] Normal [x ] NAD  Eyes: [x ] PERRL [x ] EOMI  HENT: [x ] Normal oral muscosa [x ]NC/AT  Cardiovascular: [x ] S1 [x ] S2 [ x] RRR   Respiratory: [x ] Clear to auscultation bilaterally  Gastrointestinal: [ x Soft [ ] Non-tender [ x] Non-distended [ ] BS+  Musculoskeletal: 3+ B/L pitting edema  Neurologic: [ ] Non-focal  Lymphatic: [ ] No lymphadenopathy  Psychiatry: [x ] AAOx3 [ ] Mood & affect appropriate  Skin: [ ] No rashes [ ] No ecchymoses [ x] No cyanosis      Labs:                        8.6    7.3   )-----------( 157      ( 2017 06:46 )             27.6     06-07    132<L>  |  92<L>  |  82<H>  ----------------------------<  116<H>  4.3   |  22  |  5.45<H>    Ca    8.3<L>      2017 06:46                        -----------------------------------------------------------------  [ ] Congestive Heart Failure                  [ ] Acute                                                [ ] Acute on Chronic     [ ] Chronic  [ ] Diastolic (HFpEF)  [ ] Systolic (HFrEF)  [ ] Combined Systolic & Diastolic      [ ] ACC/AHA Stage: _____     [ ] NYHA Class: _____  -----------------------------------------------------------------  [ ] GIN                                                    [ ] CKD I  [ ] ATN                                                  [ ] CKD II  [ ] Other: _____                                  [ ] CKD III                                                                [ ] CKD IV                                                                [ ] CKD V                                                                [ ] ESRD  -----------------------------------------------------------------  Abnormal Nutritional Status:  [ ] Malnutrtion (see nutrition note)  [ ] Underweight: BMI < 19  [ ] Morbid Obeisty: BMI >/ 40  [ ] Cachexia  [ ] Other

## 2017-06-07 NOTE — PROGRESS NOTE ADULT - PROBLEM SELECTOR PLAN 1
HD for fluid removal  cont losartan for afterload reduction  cont hydralazine, isordil  cont coreg  cont torsemide for maintaining urine output

## 2017-06-07 NOTE — PROGRESS NOTE ADULT - SUBJECTIVE AND OBJECTIVE BOX
Patient is a 47y Male     CC: esrd    SUBJECTIVE:   feels ok  s/p permcath and avf creation      REVIEW OF SYSTEMS:  CONSTITUTIONAL: No weakness, fevers or chills  RESPIRATORY: No cough, wheezing, hemoptysis; No shortness of breath  CARDIOVASCULAR: No chest pain or palpitations  GI : no abd pains, nausea or vomiting  GENITOURINARY: No dysuria, frequency or hematuria  All other review of systems is negative unless indicated above  PHYSICAL EXAM:  Vitals:  T(F): 98, Max: 98.4 (06-06 @ 10:18)  HR: 90  BP: 141/70  BP(mean): --  RR: 18  SpO2: 96%  Wt(kg): --  Constitutional: no acute distress  HEENT:  permcath site clean and dry  Respiratory: cta/p bilat, no wheezes, rales, nl effort  Cardiovascular: old scar s1s2  Abd: : BS+, soft, NT , no rebound or guarding, no bruits  Extremities: bilat le edema  Neurological: A/O x 3, non focal    I and O's:    I & Os for current day (as of 06-07 @ 08:03)  =============================================  IN: 1100 ml / OUT: 650 ml / NET: 450 ml          .    Allergies    No Known Allergies    Intolerances        MEDICATIONS  (STANDING):  atorvastatin 40milliGRAM(s) Oral at bedtime  amLODIPine   Tablet 10milliGRAM(s) Oral daily  ferrous    sulfate 325milliGRAM(s) Oral two times a day with meals  heparin  Injectable 5000Unit(s) SubCutaneous every 8 hours  dextrose 50% Injectable 12.5Gram(s) IV Push once  dextrose 50% Injectable 25Gram(s) IV Push once  dextrose 50% Injectable 25Gram(s) IV Push once  carvedilol 3.125milliGRAM(s) Oral every 12 hours  dextrose 50% Injectable 50milliLiter(s) IV Push once  aspirin enteric coated 81milliGRAM(s) Oral daily  hydrALAZINE 100milliGRAM(s) Oral three times a day  isosorbide   dinitrate Tablet (ISORDIL) 20milliGRAM(s) Oral three times a day  insulin lispro (HumaLOG) corrective regimen sliding scale  SubCutaneous three times a day before meals  insulin lispro (HumaLOG) corrective regimen sliding scale  SubCutaneous at bedtime  valsartan 40milliGRAM(s) Oral two times a day  torsemide 60milliGRAM(s) Oral daily  calcium acetate 667milliGRAM(s) Oral three times a day with meals  Nephrocaps 1Capsule(s) Oral daily  AQUAPHOR (petrolatum Ointment) 1Application(s) Topical two times a day  epoetin arian Injectable [id number](s) IV Push <User Schedule>      LABS:  CBC Full  -  ( 07 Jun 2017 06:46 )  WBC Count : 7.3 K/uL  Hemoglobin : 8.6 g/dL  Hematocrit : 27.6 %  Platelet Count - Automated : 157 K/uL  Mean Cell Volume : 91.5 fl  Mean Cell Hemoglobin : 28.6 pg  Mean Cell Hemoglobin Concentration : 31.3 gm/dL  Auto Neutrophil # : x  Auto Lymphocyte # : x  Auto Monocyte # : x  Auto Eosinophil # : x  Auto Basophil # : x  Auto Neutrophil % : x  Auto Lymphocyte % : x  Auto Monocyte % : x  Auto Eosinophil % : x  Auto Basophil % : x    06-07    132<L>  |  92<L>  |  82<H>  ----------------------------<  116<H>  4.3   |  22  |  5.45<H>    Ca    8.3<L>      07 Jun 2017 06:46        Urine Studies:            Assessment/Plan/Suggestions:    esrd  continue hd as scheduled  outpt hd plans in progress  compliance stressed to pt        Bharath Lutz MD  Nephrology and Hypertension  [address] [place] [address]  [phone]

## 2017-06-07 NOTE — PROGRESS NOTE ADULT - ASSESSMENT
47yo male w/ PMH HFrEF (17% 4/26/17), CAD s/p CABG x4 2016, ICM s/p recent AICD 5/2017, DM2 c/b peripheral neuropathy w/ toe ulcers presents with 6 days of progressive swelling, orthopnea, dyspnea w GIN on CKD5 requiring initiation of HD and control of HTN

## 2017-06-07 NOTE — CHART NOTE - NSCHARTNOTEFT_GEN_A_CORE
Source: Patient [ x]    Family [ ]     other [ ]    Diet : RENAL, pt states understanding of RENAL diet, he is new to HD this admission, therefore expect pt to need continued reinforcement of diet.      Patient reports [ ] nausea  [ ] vomiting [ ] diarrhea [ ] constipation  [ ]chewing problems [ ] swallowing issues  [ ] other:      PO intake:  < 50% [ ] 50-75% [ x]   % [ ]  other :     Source for PO intake [ x] Patient [ ] family [ ] chart [ ] staff [ ] other     Enteral /Parenteral Nutrition:       Current Weight: 267.2pounds/121.2kg  % Weight Change 5% gain since 5/29    Pertinent Medications: MEDICATIONS   insulin lispro (HumaLOG) corrective regimen sliding scale  SubCutaneous three times a day before meals  insulin lispro (HumaLOG) corrective regimen sliding scale  SubCutaneous at bedtime  lipitor  PHOSLO  ferrous sulfate  nephrocaps  calcium acetate 667milliGRAM(s) Oral three times a day with meals  Nephrocaps 1Capsule(s) Oral daily    MEDICATIONS  (PRN):  dextrose Gel 1Dose(s) Oral once PRN Blood Glucose LESS THAN 70 milliGRAM(s)/deciliter  glucagon  Injectable 1milliGRAM(s) IntraMuscular once PRN Glucose LESS THAN 70 milligrams/deciliter    Pertinent Labs:  06-07 Na132 mmol/L<L> Glu 116 mg/dL<H> K+ 4.3 mmol/L Cr  5.45 mg/dL<H> BUN 82 mg/dL<H>       Skin: +4 right/left leg, weeping edema LE    Estimated Needs:   [ x] no change since previous assessment  [ ] recalculated:       Previous Nutrition Diagnosis:     [ ] Inadequate Energy Intake [ ]Inadequate Oral Intake [ ] Excessive Energy Intake     [ ] Underweight [ ] Increased Nutrient Needs [ ] Overweight/Obesity     [ ] Altered GI Function [ ] Unintended Weight Loss [x ] Food & Nutrition Related Knowledge Deficit [ ] Malnutrition          Nutrition Diagnosis is [ x] ongoing  [ ] resolved [ ] not applicable          New Nutrition Diagnosis: [ x] not applicable    [ ] Inadequate Protein Energy Intake [ ]Inadequate Oral Intake [ ] Excessive Energy Intake     [ ] Underweight [ ] Increased Nutrient Needs [ ] Overweight/Obesity     [ ] Altered GI Function [ ] Unintended Weight Loss [x ] Food & Nutrition Related Knowledge Deficit[ ] Limited Adherence to nutrition related recommendations [ ] Malnutrition  [ ] other: Free text       Related to:      As evidenced by:      Interventions:     Recommend    [ ] Change Diet To:    [x ] Nutrition Supplement: increase Nepro to 2 x daily as per pt request    [ ] Nutrition Support    [ ] Other:        Monitoring and Evaluation:     [x ] PO intake [ ] Tolerance to diet prescription [ ] weights [ ] follow up per protocol    [ ] other: Source: Patient [ x]    Family [ ]     other [ ]    Diet : CSTCHOSN/RENAL, pt states understanding of RENAL diet, he is new to HD this admission, therefore expect pt to need continued reinforcement of diet.      Patient reports [ ] nausea  [ ] vomiting [ ] diarrhea [ ] constipation  [ ]chewing problems [ ] swallowing issues  [ ] other:      PO intake:  < 50% [ ] 50-75% [ x]   % [ ]  other :     Source for PO intake [ x] Patient [ ] family [ ] chart [ ] staff [ ] other     Enteral /Parenteral Nutrition:       Current Weight: 267.2pounds/121.2kg  % Weight Change 5% gain since 5/29    Pertinent Medications: MEDICATIONS   insulin lispro (HumaLOG) corrective regimen sliding scale  SubCutaneous three times a day before meals  insulin lispro (HumaLOG) corrective regimen sliding scale  SubCutaneous at bedtime  lipitor  PHOSLO  ferrous sulfate  nephrocaps  calcium acetate 667milliGRAM(s) Oral three times a day with meals  Nephrocaps 1Capsule(s) Oral daily    MEDICATIONS  (PRN):  dextrose Gel 1Dose(s) Oral once PRN Blood Glucose LESS THAN 70 milliGRAM(s)/deciliter  glucagon  Injectable 1milliGRAM(s) IntraMuscular once PRN Glucose LESS THAN 70 milligrams/deciliter    Pertinent Labs:  06-07 Na132 mmol/L<L> Glu 116 mg/dL<H> K+ 4.3 mmol/L Cr  5.45 mg/dL<H> BUN 82 mg/dL<H>       Skin: +4 right/left leg, weeping edema LE    Estimated Needs:   [ x] no change since previous assessment  [ ] recalculated:       Previous Nutrition Diagnosis:     [ ] Inadequate Energy Intake [ ]Inadequate Oral Intake [ ] Excessive Energy Intake     [ ] Underweight [ ] Increased Nutrient Needs [ ] Overweight/Obesity     [ ] Altered GI Function [ ] Unintended Weight Loss [x ] Food & Nutrition Related Knowledge Deficit [ ] Malnutrition          Nutrition Diagnosis is [ x] ongoing  [ ] resolved [ ] not applicable          New Nutrition Diagnosis: [ x] not applicable    [ ] Inadequate Protein Energy Intake [ ]Inadequate Oral Intake [ ] Excessive Energy Intake     [ ] Underweight [ ] Increased Nutrient Needs [ ] Overweight/Obesity     [ ] Altered GI Function [ ] Unintended Weight Loss [x ] Food & Nutrition Related Knowledge Deficit[ ] Limited Adherence to nutrition related recommendations [ ] Malnutrition  [ ] other: Free text       Related to:      As evidenced by:      Interventions:     Recommend    [ ] Change Diet To:    [x ] Nutrition Supplement: increase Nepro to 2 x daily as per pt request    [ ] Nutrition Support    [ ] Other:        Monitoring and Evaluation:     [x ] PO intake [ ] Tolerance to diet prescription [ ] weights [ ] follow up per protocol    [ ] other:

## 2017-06-08 DIAGNOSIS — R42 DIZZINESS AND GIDDINESS: ICD-10-CM

## 2017-06-08 LAB
ANION GAP SERPL CALC-SCNC: 15 MMOL/L — SIGNIFICANT CHANGE UP (ref 5–17)
BUN SERPL-MCNC: 63 MG/DL — HIGH (ref 7–23)
CALCIUM SERPL-MCNC: 8.1 MG/DL — LOW (ref 8.4–10.5)
CHLORIDE SERPL-SCNC: 94 MMOL/L — LOW (ref 96–108)
CO2 SERPL-SCNC: 24 MMOL/L — SIGNIFICANT CHANGE UP (ref 22–31)
CREAT SERPL-MCNC: 4.84 MG/DL — HIGH (ref 0.5–1.3)
GLUCOSE SERPL-MCNC: 120 MG/DL — HIGH (ref 70–99)
HCT VFR BLD CALC: 24.3 % — LOW (ref 39–50)
HGB BLD-MCNC: 8.2 G/DL — LOW (ref 13–17)
MCHC RBC-ENTMCNC: 31.1 PG — SIGNIFICANT CHANGE UP (ref 27–34)
MCHC RBC-ENTMCNC: 33.6 GM/DL — SIGNIFICANT CHANGE UP (ref 32–36)
MCV RBC AUTO: 92.5 FL — SIGNIFICANT CHANGE UP (ref 80–100)
PLATELET # BLD AUTO: 154 K/UL — SIGNIFICANT CHANGE UP (ref 150–400)
POTASSIUM SERPL-MCNC: 4.4 MMOL/L — SIGNIFICANT CHANGE UP (ref 3.5–5.3)
POTASSIUM SERPL-SCNC: 4.4 MMOL/L — SIGNIFICANT CHANGE UP (ref 3.5–5.3)
RBC # BLD: 2.63 M/UL — LOW (ref 4.2–5.8)
RBC # FLD: 15.4 % — HIGH (ref 10.3–14.5)
SODIUM SERPL-SCNC: 133 MMOL/L — LOW (ref 135–145)
WBC # BLD: 5.4 K/UL — SIGNIFICANT CHANGE UP (ref 3.8–10.5)
WBC # FLD AUTO: 5.4 K/UL — SIGNIFICANT CHANGE UP (ref 3.8–10.5)

## 2017-06-08 RX ADMIN — VALSARTAN 40 MILLIGRAM(S): 80 TABLET ORAL at 05:51

## 2017-06-08 RX ADMIN — Medication 1: at 18:16

## 2017-06-08 RX ADMIN — Medication 325 MILLIGRAM(S): at 18:16

## 2017-06-08 RX ADMIN — AMLODIPINE BESYLATE 10 MILLIGRAM(S): 2.5 TABLET ORAL at 05:51

## 2017-06-08 RX ADMIN — ATORVASTATIN CALCIUM 40 MILLIGRAM(S): 80 TABLET, FILM COATED ORAL at 21:16

## 2017-06-08 RX ADMIN — Medication 60 MILLIGRAM(S): at 05:51

## 2017-06-08 RX ADMIN — Medication 1 APPLICATION(S): at 05:51

## 2017-06-08 RX ADMIN — Medication 100 MILLIGRAM(S): at 21:16

## 2017-06-08 RX ADMIN — CARVEDILOL PHOSPHATE 3.12 MILLIGRAM(S): 80 CAPSULE, EXTENDED RELEASE ORAL at 09:16

## 2017-06-08 RX ADMIN — Medication 1: at 13:57

## 2017-06-08 RX ADMIN — Medication 100 MILLIGRAM(S): at 13:57

## 2017-06-08 RX ADMIN — ISOSORBIDE DINITRATE 20 MILLIGRAM(S): 5 TABLET ORAL at 05:51

## 2017-06-08 RX ADMIN — HEPARIN SODIUM 5000 UNIT(S): 5000 INJECTION INTRAVENOUS; SUBCUTANEOUS at 05:51

## 2017-06-08 RX ADMIN — Medication 1 APPLICATION(S): at 18:16

## 2017-06-08 RX ADMIN — Medication 1 CAPSULE(S): at 13:57

## 2017-06-08 RX ADMIN — ISOSORBIDE DINITRATE 20 MILLIGRAM(S): 5 TABLET ORAL at 21:16

## 2017-06-08 RX ADMIN — Medication 81 MILLIGRAM(S): at 13:58

## 2017-06-08 RX ADMIN — HEPARIN SODIUM 5000 UNIT(S): 5000 INJECTION INTRAVENOUS; SUBCUTANEOUS at 13:58

## 2017-06-08 RX ADMIN — Medication 667 MILLIGRAM(S): at 13:58

## 2017-06-08 RX ADMIN — Medication 325 MILLIGRAM(S): at 09:16

## 2017-06-08 RX ADMIN — Medication 667 MILLIGRAM(S): at 09:15

## 2017-06-08 RX ADMIN — Medication 100 MILLIGRAM(S): at 05:51

## 2017-06-08 RX ADMIN — Medication 667 MILLIGRAM(S): at 18:16

## 2017-06-08 RX ADMIN — VALSARTAN 40 MILLIGRAM(S): 80 TABLET ORAL at 18:17

## 2017-06-08 RX ADMIN — CARVEDILOL PHOSPHATE 3.12 MILLIGRAM(S): 80 CAPSULE, EXTENDED RELEASE ORAL at 21:16

## 2017-06-08 RX ADMIN — HEPARIN SODIUM 5000 UNIT(S): 5000 INJECTION INTRAVENOUS; SUBCUTANEOUS at 21:16

## 2017-06-08 RX ADMIN — ISOSORBIDE DINITRATE 20 MILLIGRAM(S): 5 TABLET ORAL at 14:37

## 2017-06-08 NOTE — PROGRESS NOTE ADULT - ASSESSMENT
Imp-  47y Male with ESRD now on HD  systolic CMP  fluid overload  lightheaded with  this am  will stop amlodipine  cont phoslo  anemia of CKD - adequate iron stores  procrit with HD  next HD planned Fri

## 2017-06-08 NOTE — PROGRESS NOTE ADULT - PROBLEM SELECTOR PLAN 1
HD for fluid removal  cont losartan for afterload reduction, would not up titrate afterload Rx today until better understanding of why Pt is dizzy today  cont hydralazine, isordil  cont coreg  cont torsemide for maintaining urine output

## 2017-06-08 NOTE — PROGRESS NOTE ADULT - SUBJECTIVE AND OBJECTIVE BOX
HPI:  Feeling dizzy today, still volume overloaded    Review Of Systems:  See above, otherwise, complete 10 point review of systems negative.    [ ]Unable to obtain    Medications:  atorvastatin 40milliGRAM(s) Oral at bedtime  ferrous    sulfate 325milliGRAM(s) Oral two times a day with meals  heparin  Injectable 5000Unit(s) SubCutaneous every 8 hours  acetaminophen   Tablet 650milliGRAM(s) Oral every 6 hours PRN  acetaminophen   Tablet. 650milliGRAM(s) Oral every 6 hours PRN  dextrose Gel 1Dose(s) Oral once PRN  dextrose 50% Injectable 12.5Gram(s) IV Push once  dextrose 50% Injectable 25Gram(s) IV Push once  dextrose 50% Injectable 25Gram(s) IV Push once  glucagon  Injectable 1milliGRAM(s) IntraMuscular once PRN  carvedilol 3.125milliGRAM(s) Oral every 12 hours  dextrose 50% Injectable 50milliLiter(s) IV Push once  aspirin enteric coated 81milliGRAM(s) Oral daily  hydrALAZINE 100milliGRAM(s) Oral three times a day  isosorbide   dinitrate Tablet (ISORDIL) 20milliGRAM(s) Oral three times a day  insulin lispro (HumaLOG) corrective regimen sliding scale  SubCutaneous three times a day before meals  insulin lispro (HumaLOG) corrective regimen sliding scale  SubCutaneous at bedtime  valsartan 40milliGRAM(s) Oral two times a day  torsemide 60milliGRAM(s) Oral daily  calcium acetate 667milliGRAM(s) Oral three times a day with meals  Nephrocaps 1Capsule(s) Oral daily  AQUAPHOR (petrolatum Ointment) 1Application(s) Topical two times a day  epoetin arian Injectable 01907Swyg(s) IV Push <User Schedule>    Vitals:  T(C): 36.8, Max: 37.1 (06-07 @ 12:53)  HR: 90 (86 - 91)  BP: 130/66 (109/64 - 149/73)  BP(mean): --  ABP: --  ABP(mean): --  RR: 18 (18 - 18)  SpO2: 94% (92% - 94%)  Wt(kg): --  CVP(cm H2O): --  CO: --  CI: --  PA: --  PA(mean): --  PCWP: --  SVR: --  PVR: --    Daily     Daily Weight in k (2017 04:12)    I&O's Summary  I & Os for 24h ending 2017 07:00  =============================================  IN: 480 ml / OUT: 2200 ml / NET: -1720 ml    I & Os for current day (as of 2017 11:42)  =============================================  IN: 240 ml / OUT: 0 ml / NET: 240 ml      Physical Exam:  Appearance: No Acute Distress  HEENT:   Normal oral mucosa, PERRL, EOMI	  Cardiovascular: Normal S1 S2, No JVD, No murmurs/rubs/gallops  Respiratory: Clear to auscultation bilaterally  Gastrointestinal:  Soft, Non-tender	  Skin: No cyanosis	  Neurologic: Non-focal  Extremities: No clubbing, cyanosis or edema  Vascular: 2+ DP/PT pulses bilateral  Psychiatry: A & O x 3, Mood & affect appropriate    LVAD Interrogation:  Pump Flow:  Pump Speed:  Pulse Index:  Pump Power:  VAD Events:   Driveline evaluation:    Programming Changes: [ ] No changes made [ ] Changes made:  Labs:                        8.2    5.4   )-----------( 154      ( 2017 05:59 )             24.3     06-08    133<L>  |  94<L>  |  63<H>  ----------------------------<  120<H>  4.4   |  24  |  4.84<H>    Ca    8.1<L>      2017 05:59        TELEMETRY: 	    ECG:      [ ] Echocardiogram:    -----------------------------------------------------------------  [ ] Congestive Heart Failure                  [ ] Acute                                                [ ] Acute on Chronic     [ ] Chronic  [ ] Diastolic (HFpEF)  [ ] Systolic (HFrEF)  [ ] Combined Systolic & Diastolic      [ ] ACC/AHA Stage: _____     [ ] NYHA Class: _____  -----------------------------------------------------------------  [ ] GIN                                                    [ ] CKD I  [ ] ATN                                                  [ ] CKD II  [ ] Other: _____                                  [ ] CKD III                                                                [ ] CKD IV                                                                [ ] CKD V                                                                [ ] ESRD  -----------------------------------------------------------------  Abnormal Nutritional Status:  [ ] Malnutrtion (see nutrition note)  [ ] Underweight: BMI < 19  [ ] Morbid Obeisty: BMI >/ 40  [ ] Cachexia  [ ] Other

## 2017-06-08 NOTE — PROGRESS NOTE ADULT - ASSESSMENT
45yo male w/ PMH HFrEF (17% 4/26/17), CAD s/p CABG x4 2016, ICM s/p recent AICD 5/2017, DM2 c/b peripheral neuropathy w/ toe ulcers presents with 6 days of progressive swelling, orthopnea, dyspnea w GIN on CKD5 requiring initiation of HD and control of HTN

## 2017-06-08 NOTE — PROGRESS NOTE ADULT - SUBJECTIVE AND OBJECTIVE BOX
Patient is a 47y old  Male who presents with a chief complaint of acute on chronic heart failure, acute renal failure (31 May 2017 09:48)      SUBJECTIVE / OVERNIGHT EVENTS: c/o lightheadness, dizzy  Review of Systems  chest pain no  palpitations no  sob no  nausea no  headache no    MEDICATIONS  (STANDING):  atorvastatin 40milliGRAM(s) Oral at bedtime  ferrous    sulfate 325milliGRAM(s) Oral two times a day with meals  heparin  Injectable 5000Unit(s) SubCutaneous every 8 hours  dextrose 50% Injectable 12.5Gram(s) IV Push once  dextrose 50% Injectable 25Gram(s) IV Push once  dextrose 50% Injectable 25Gram(s) IV Push once  carvedilol 3.125milliGRAM(s) Oral every 12 hours  dextrose 50% Injectable 50milliLiter(s) IV Push once  aspirin enteric coated 81milliGRAM(s) Oral daily  hydrALAZINE 100milliGRAM(s) Oral three times a day  isosorbide   dinitrate Tablet (ISORDIL) 20milliGRAM(s) Oral three times a day  insulin lispro (HumaLOG) corrective regimen sliding scale  SubCutaneous three times a day before meals  insulin lispro (HumaLOG) corrective regimen sliding scale  SubCutaneous at bedtime  valsartan 40milliGRAM(s) Oral two times a day  torsemide 60milliGRAM(s) Oral daily  calcium acetate 667milliGRAM(s) Oral three times a day with meals  Nephrocaps 1Capsule(s) Oral daily  AQUAPHOR (petrolatum Ointment) 1Application(s) Topical two times a day  epoetin arian Injectable 44662Tass(s) IV Push <User Schedule>    MEDICATIONS  (PRN):  acetaminophen   Tablet 650milliGRAM(s) Oral every 6 hours PRN For Temp greater than 38.5 C (101.3 F)  acetaminophen   Tablet. 650milliGRAM(s) Oral every 6 hours PRN mild-moderate pain  dextrose Gel 1Dose(s) Oral once PRN Blood Glucose LESS THAN 70 milliGRAM(s)/deciliter  glucagon  Injectable 1milliGRAM(s) IntraMuscular once PRN Glucose LESS THAN 70 milligrams/deciliter      Vital Signs Last 24 Hrs  T(C): 36.9, Max: 36.9 (06-08 @ 13:55)  HR: 93 (88 - 93)  BP: 135/74 (109/64 - 149/73)  RR: 20 (18 - 20)  SpO2: 92% (92% - 94%)  Wt(kg): --  CAPILLARY BLOOD GLUCOSE  189 (08 Jun 2017 13:00)  107 (08 Jun 2017 08:29)  158 (07 Jun 2017 21:13)  173 (07 Jun 2017 18:09)    I&O's Summary  I & Os for 24h ending 08 Jun 2017 07:00  =============================================  IN: 480 ml / OUT: 2200 ml / NET: -1720 ml    I & Os for current day (as of 08 Jun 2017 15:40)  =============================================  IN: 720 ml / OUT: 0 ml / NET: 720 ml      PHYSICAL EXAM:  GENERAL: NAD, well-developed  HEAD:  Atraumatic, Normocephalic  EYES: EOMI, PERRLA, conjunctiva and sclera clear  NECK: Supple, No JVD  CHEST/LUNG: Clear to auscultation bilaterally; No wheeze  HEART: Regular rate and rhythm; No murmurs, rubs, or gallops  ABDOMEN: Soft, Nontender, Nondistended; Bowel sounds present  EXTREMITIES:  2+ Peripheral Pulses, No clubbing, cyanosis,3+ edema bilateral  AOx3  NEUROLOGY: non-focal  SKIN: No rashes or lesions    LABS:                        8.2    5.4   )-----------( 154      ( 08 Jun 2017 05:59 )             24.3     06-08    133<L>  |  94<L>  |  63<H>  ----------------------------<  120<H>  4.4   |  24  |  4.84<H>    Ca    8.1<L>      08 Jun 2017 05:59                RADIOLOGY & ADDITIONAL TESTS:    Imaging Personally Reviewed:    Consultant(s) Notes Reviewed:      Care Discussed with Consultants/Other Providers:

## 2017-06-08 NOTE — PROGRESS NOTE ADULT - SUBJECTIVE AND OBJECTIVE BOX
Patient is a 47y Male  being evaluated for   ESRD who reports feeling lightheaded this am.    PHYSICAL EXAM:  Vitals:T(C): 36.8, Max: 37.1 (06-07 @ 12:53)  HR: 90 (86 - 98)  BP: 109/64 (109/64 - 149/73)  RR: 18 (18 - 18)  SpO2: 94% (92% - 94%)  Wt(kg): --    Constitutional: no acute distress  HEENT:  NC, ext ears nl, oropharynx clear,  nose nl  Neck: No JVD, bruit, adenopathy or thyromegaly  Eyes: PERRL, no discharge, no icterus  Respiratory: cta/p bilat, no wheezes, rales, nl effort  Cardiovascular: regular rate, S1 and S2 no rub or gallop  Abd: : BS+, soft, NT , no rebound or guarding, no bruits  Extremities: + Rt permcath with some oozing from site -2+ edema,no  cyanosis, palpable pulses  Neurological: A/O x 3, nl coordination and tone    I and O's:  I & Os for current day (as of 06-08 @ 09:53)  =============================================  IN: 480 ml / OUT: 2200 ml / NET: -1720 ml            REVIEW OF SYSTEMS:  CONSTITUTIONAL: No weakness, fevers or chills  RESPIRATORY: No cough, wheezing, hemoptysis; No shortness of breath  CARDIOVASCULAR: No chest pain or palpitations  GI : no abd pain, nausea or vomiting  GENITOURINARY: No dysuria, frequency or hematuria  All other review of systems is negative unless indicated above.    Allergies    No Known Allergies    Intolerances          MEDICATIONS  (STANDING):  atorvastatin 40milliGRAM(s) Oral at bedtime  ferrous    sulfate 325milliGRAM(s) Oral two times a day with meals  heparin  Injectable 5000Unit(s) SubCutaneous every 8 hours  dextrose 50% Injectable 12.5Gram(s) IV Push once  dextrose 50% Injectable 25Gram(s) IV Push once  dextrose 50% Injectable 25Gram(s) IV Push once  carvedilol 3.125milliGRAM(s) Oral every 12 hours  dextrose 50% Injectable 50milliLiter(s) IV Push once  aspirin enteric coated 81milliGRAM(s) Oral daily  hydrALAZINE 100milliGRAM(s) Oral three times a day  isosorbide   dinitrate Tablet (ISORDIL) 20milliGRAM(s) Oral three times a day  insulin lispro (HumaLOG) corrective regimen sliding scale  SubCutaneous three times a day before meals  insulin lispro (HumaLOG) corrective regimen sliding scale  SubCutaneous at bedtime  valsartan 40milliGRAM(s) Oral two times a day  torsemide 60milliGRAM(s) Oral daily  calcium acetate 667milliGRAM(s) Oral three times a day with meals  Nephrocaps 1Capsule(s) Oral daily  AQUAPHOR (petrolatum Ointment) 1Application(s) Topical two times a day  epoetin arian Injectable 86268Zctr(s) IV Push <User Schedule>      Sodium,Serum 133    06-08 @ 05:59  Sodium,Serum 132    06-07 @ 06:46  Sodium,Serum 132    06-06 @ 07:40  Sodium,Serum 129    06-05 @ 07:18    Potassium,Serum 4.4    06-08 @ 05:59  Potassium,Serum 4.3    06-07 @ 06:46  Potassium,Serum 4.4    06-06 @ 07:40  Potassium,Serum 4.2    06-05 @ 07:18    Chloride,Serum 94    06-08 @ 05:59  Chloride,Serum 92    06-07 @ 06:46  Chloride,Serum 95    06-06 @ 07:40  Chloride,Serum 89    06-05 @ 07:18    CO2, Serum 24    06-08 @ 05:59  CO2, Serum 22    06-07 @ 06:46  CO2, Serum 19    06-06 @ 07:40  CO2, Serum 20    06-05 @ 07:18    BUN 63    06-08 @ 05:59  BUN 82    06-07 @ 06:46  BUN 70    06-06 @ 07:40  BUN 85    06-05 @ 07:18    Creatinine, Serum 4.84    06-08 @ 05:59  Creatinine, Serum 5.45    06-07 @ 06:46  Creatinine, Serum 4.67    06-06 @ 07:40  Creatinine, Serum 5.10    06-05 @ 07:18      06-08    133<L>  |  94<L>  |  63<H>  ----------------------------<  120<H>  4.4   |  24  |  4.84<H>    Ca    8.1<L>      08 Jun 2017 05:59                              8.2    5.4   )-----------( 154      ( 08 Jun 2017 05:59 )             24.3

## 2017-06-08 NOTE — PROVIDER CONTACT NOTE (OTHER) - ASSESSMENT
Patient alert and oriented x4. Patient complaining of dizziness since about 0800.  1111 vitals: HR = 90; BP = 130/66

## 2017-06-09 LAB
ANION GAP SERPL CALC-SCNC: 13 MMOL/L — SIGNIFICANT CHANGE UP (ref 5–17)
BUN SERPL-MCNC: 79 MG/DL — HIGH (ref 7–23)
CALCIUM SERPL-MCNC: 8.5 MG/DL — SIGNIFICANT CHANGE UP (ref 8.4–10.5)
CHLORIDE SERPL-SCNC: 96 MMOL/L — SIGNIFICANT CHANGE UP (ref 96–108)
CO2 SERPL-SCNC: 25 MMOL/L — SIGNIFICANT CHANGE UP (ref 22–31)
CREAT SERPL-MCNC: 5.89 MG/DL — HIGH (ref 0.5–1.3)
GLUCOSE SERPL-MCNC: 118 MG/DL — HIGH (ref 70–99)
HCT VFR BLD CALC: 26.2 % — LOW (ref 39–50)
HGB BLD-MCNC: 8.6 G/DL — LOW (ref 13–17)
MCHC RBC-ENTMCNC: 30.5 PG — SIGNIFICANT CHANGE UP (ref 27–34)
MCHC RBC-ENTMCNC: 32.9 GM/DL — SIGNIFICANT CHANGE UP (ref 32–36)
MCV RBC AUTO: 92.6 FL — SIGNIFICANT CHANGE UP (ref 80–100)
PHOSPHATE SERPL-MCNC: 2.8 MG/DL — SIGNIFICANT CHANGE UP (ref 2.5–4.5)
PLATELET # BLD AUTO: 175 K/UL — SIGNIFICANT CHANGE UP (ref 150–400)
POTASSIUM SERPL-MCNC: 4.5 MMOL/L — SIGNIFICANT CHANGE UP (ref 3.5–5.3)
POTASSIUM SERPL-SCNC: 4.5 MMOL/L — SIGNIFICANT CHANGE UP (ref 3.5–5.3)
RBC # BLD: 2.83 M/UL — LOW (ref 4.2–5.8)
RBC # FLD: 15.4 % — HIGH (ref 10.3–14.5)
SODIUM SERPL-SCNC: 134 MMOL/L — LOW (ref 135–145)
WBC # BLD: 5.5 K/UL — SIGNIFICANT CHANGE UP (ref 3.8–10.5)
WBC # FLD AUTO: 5.5 K/UL — SIGNIFICANT CHANGE UP (ref 3.8–10.5)

## 2017-06-09 RX ADMIN — Medication 1 APPLICATION(S): at 05:28

## 2017-06-09 RX ADMIN — Medication 667 MILLIGRAM(S): at 08:11

## 2017-06-09 RX ADMIN — Medication 60 MILLIGRAM(S): at 13:44

## 2017-06-09 RX ADMIN — Medication 1 APPLICATION(S): at 18:10

## 2017-06-09 RX ADMIN — HEPARIN SODIUM 5000 UNIT(S): 5000 INJECTION INTRAVENOUS; SUBCUTANEOUS at 05:28

## 2017-06-09 RX ADMIN — Medication 1: at 13:44

## 2017-06-09 RX ADMIN — Medication 1 CAPSULE(S): at 13:44

## 2017-06-09 RX ADMIN — Medication 667 MILLIGRAM(S): at 18:10

## 2017-06-09 RX ADMIN — Medication 325 MILLIGRAM(S): at 08:11

## 2017-06-09 RX ADMIN — HEPARIN SODIUM 5000 UNIT(S): 5000 INJECTION INTRAVENOUS; SUBCUTANEOUS at 13:44

## 2017-06-09 RX ADMIN — Medication 100 MILLIGRAM(S): at 13:44

## 2017-06-09 RX ADMIN — ISOSORBIDE DINITRATE 20 MILLIGRAM(S): 5 TABLET ORAL at 13:44

## 2017-06-09 RX ADMIN — Medication 1: at 18:10

## 2017-06-09 RX ADMIN — Medication 325 MILLIGRAM(S): at 18:10

## 2017-06-09 RX ADMIN — HEPARIN SODIUM 5000 UNIT(S): 5000 INJECTION INTRAVENOUS; SUBCUTANEOUS at 22:16

## 2017-06-09 RX ADMIN — ISOSORBIDE DINITRATE 20 MILLIGRAM(S): 5 TABLET ORAL at 22:16

## 2017-06-09 RX ADMIN — Medication 667 MILLIGRAM(S): at 13:45

## 2017-06-09 RX ADMIN — Medication 100 MILLIGRAM(S): at 22:16

## 2017-06-09 RX ADMIN — ATORVASTATIN CALCIUM 40 MILLIGRAM(S): 80 TABLET, FILM COATED ORAL at 22:16

## 2017-06-09 RX ADMIN — Medication 81 MILLIGRAM(S): at 13:45

## 2017-06-09 RX ADMIN — CARVEDILOL PHOSPHATE 3.12 MILLIGRAM(S): 80 CAPSULE, EXTENDED RELEASE ORAL at 18:11

## 2017-06-09 RX ADMIN — VALSARTAN 40 MILLIGRAM(S): 80 TABLET ORAL at 18:10

## 2017-06-09 RX ADMIN — ERYTHROPOIETIN 10000 UNIT(S): 10000 INJECTION, SOLUTION INTRAVENOUS; SUBCUTANEOUS at 10:45

## 2017-06-09 NOTE — PROGRESS NOTE ADULT - ASSESSMENT
Acute on chronic systolic HF with reduced Ef 17   Plan : PENDING View by HF Attending   continue present medications Acute on chronic systolic HF with reduced Ef 17   Plan : PENDING View by HF Attending   continue present medications   Daily weights

## 2017-06-09 NOTE — PROGRESS NOTE ADULT - SUBJECTIVE AND OBJECTIVE BOX
Patient is a 47y old  Male who presents with a chief complaint of acute on chronic heart failure, acute renal failure (31 May 2017 09:48)      SUBJECTIVE / OVERNIGHT EVENTS: Feels better with imroving sob.  Review of Systems  chest pain no  palpitations no  sob yes  nausea no  headache no    MEDICATIONS  (STANDING):  atorvastatin 40milliGRAM(s) Oral at bedtime  ferrous    sulfate 325milliGRAM(s) Oral two times a day with meals  heparin  Injectable 5000Unit(s) SubCutaneous every 8 hours  dextrose 50% Injectable 12.5Gram(s) IV Push once  dextrose 50% Injectable 25Gram(s) IV Push once  dextrose 50% Injectable 25Gram(s) IV Push once  carvedilol 3.125milliGRAM(s) Oral every 12 hours  dextrose 50% Injectable 50milliLiter(s) IV Push once  aspirin enteric coated 81milliGRAM(s) Oral daily  hydrALAZINE 100milliGRAM(s) Oral three times a day  isosorbide   dinitrate Tablet (ISORDIL) 20milliGRAM(s) Oral three times a day  insulin lispro (HumaLOG) corrective regimen sliding scale  SubCutaneous three times a day before meals  insulin lispro (HumaLOG) corrective regimen sliding scale  SubCutaneous at bedtime  valsartan 40milliGRAM(s) Oral two times a day  torsemide 60milliGRAM(s) Oral daily  calcium acetate 667milliGRAM(s) Oral three times a day with meals  Nephrocaps 1Capsule(s) Oral daily  AQUAPHOR (petrolatum Ointment) 1Application(s) Topical two times a day  epoetin arian Injectable 99171Zxax(s) IV Push <User Schedule>    MEDICATIONS  (PRN):  acetaminophen   Tablet 650milliGRAM(s) Oral every 6 hours PRN For Temp greater than 38.5 C (101.3 F)  acetaminophen   Tablet. 650milliGRAM(s) Oral every 6 hours PRN mild-moderate pain  dextrose Gel 1Dose(s) Oral once PRN Blood Glucose LESS THAN 70 milliGRAM(s)/deciliter  glucagon  Injectable 1milliGRAM(s) IntraMuscular once PRN Glucose LESS THAN 70 milligrams/deciliter      Vital Signs Last 24 Hrs  T(C): 36.4, Max: 37.1 (06-09 @ 04:47)  HR: 98 (71 - 100)  BP: 138/69 (121/74 - 154/80)  RR: 18 (18 - 19)  SpO2: 93% (91% - 94%)  Wt(kg): --  CAPILLARY BLOOD GLUCOSE  158 (09 Jun 2017 17:59)  155 (09 Jun 2017 13:39)  121 (09 Jun 2017 08:06)  179 (08 Jun 2017 21:16)    I&O's Summary  I & Os for 24h ending 09 Jun 2017 07:00  =============================================  IN: 1420 ml / OUT: 0 ml / NET: 1420 ml    I & Os for current day (as of 09 Jun 2017 19:26)  =============================================  IN: 780 ml / OUT: 0 ml / NET: 780 ml      PHYSICAL EXAM:  GENERAL: NAD, well-developed  HEAD:  Atraumatic, Normocephalic  EYES: EOMI, PERRLA, conjunctiva and sclera clear  NECK: Supple, No JVD  CHEST/LUNG:decreased basilar BS  HEART: Regular rate and rhythm; No murmurs, rubs, or gallops  ABDOMEN: Soft, Nontender, Nondistended; Bowel sounds present  EXTREMITIES:  2+ Peripheral Pulses, 3+edema bilateral  PSYCH: AAOx3  NEUROLOGY: non-focal  SKIN: No rashes or lesions    LABS:                        8.6    5.5   )-----------( 175      ( 09 Jun 2017 06:58 )             26.2     06-09    134<L>  |  96  |  79<H>  ----------------------------<  118<H>  4.5   |  25  |  5.89<H>    Ca    8.5      09 Jun 2017 06:58  Phos  2.8     06-09                RADIOLOGY & ADDITIONAL TESTS:    Imaging Personally Reviewed:    Consultant(s) Notes Reviewed:      Care Discussed with Consultants/Other Providers:

## 2017-06-09 NOTE — PROGRESS NOTE ADULT - ASSESSMENT
Imp-  47y Male with ESRD now on HD  systolic CMP  fluid overload  cont phoslo  anemia of CKD - adequate iron stores  procrit with HD  next HD planned today  outpd hd plans in progess

## 2017-06-09 NOTE — PROGRESS NOTE ADULT - SUBJECTIVE AND OBJECTIVE BOX
47yo male w/ PMH HFrEF (17% 17), CAD s/p CABG x4 2016, ICM s/p recent AICD 2017, DM2 c/b peripheral neuropathy w/ toe ulcers presents with 6 days of progressive swelling, orthopnea, dyspnea w GIN on CKD5 requiring initiation of HD and control of HTN      PAST MEDICAL & SURGICAL HISTORY:  Acute on chronic systolic heart failure  Coronary artery disease  Foot ulcer due to secondary DM  Diabetes mellitus: type 2  AICD (automatic cardioverter/defibrillator) present  S/P CABG (coronary artery bypass graft)  Toe amputation status, left  Breast Reduction: at age 18      MEDICATIONS  (STANDING):  atorvastatin 40milliGRAM(s) Oral at bedtime  ferrous    sulfate 325milliGRAM(s) Oral two times a day with meals  heparin  Injectable 5000Unit(s) SubCutaneous every 8 hours  dextrose 50% Injectable 12.5Gram(s) IV Push once  dextrose 50% Injectable 25Gram(s) IV Push once  dextrose 50% Injectable 25Gram(s) IV Push once  carvedilol 3.125milliGRAM(s) Oral every 12 hours  dextrose 50% Injectable 50milliLiter(s) IV Push once  aspirin enteric coated 81milliGRAM(s) Oral daily  hydrALAZINE 100milliGRAM(s) Oral three times a day  isosorbide   dinitrate Tablet (ISORDIL) 20milliGRAM(s) Oral three times a day  insulin lispro (HumaLOG) corrective regimen sliding scale  SubCutaneous three times a day before meals  insulin lispro (HumaLOG) corrective regimen sliding scale  SubCutaneous at bedtime  valsartan 40milliGRAM(s) Oral two times a day  torsemide 60milliGRAM(s) Oral daily  calcium acetate 667milliGRAM(s) Oral three times a day with meals  Nephrocaps 1Capsule(s) Oral daily  AQUAPHOR (petrolatum Ointment) 1Application(s) Topical two times a day  epoetin arian Injectable 29552Khmi(s) IV Push <User Schedule>      REVIEW OF SYSTEMS:    Vital Signs Last 24 Hrs  T(C): 37.1, Max: 37.1 ( @ 04:47)  T(F): 98.7, Max: 98.7 ( @ 04:47)  HR: 89 (89 - 96)  BP: 129/69 (129/69 - 145/75)  BP(mean): --  RR: 18 (18 - 20)  SpO2: 94% (91% - 94%)    PHYSICAL EXAM:  GENERAL: NAD, well-groomed, well-developed  HEAD:  Atraumatic, Normocephalic  EYES: EOMI, PERRLA, conjunctiva and sclera clear  ENMT: No tonsillar erythema, exudates, or enlargement; Moist mucous membranes, Good dentition, No lesions  NECK: Supple, No JVD, Normal thyroid  NERVOUS SYSTEM:  Alert & Oriented X3, Good concentration; Motor Strength 5/5 B/L upper and lower extremities; DTRs 2+ intact and symmetric  CHEST/LUNG: Clear to percussion bilaterally; No rales, rhonchi, wheezing, or rubs  HEART: Regular rate and rhythm; No murmurs, rubs, or gallops  ABDOMEN: Soft, Nontender, Nondistended; Bowel sounds present  EXTREMITIES:  2+ Peripheral Pulses, No clubbing, cyanosis, or edema  LYMPH: No lymphadenopathy noted  SKIN: No rashes or lesions    Daily     Daily Weight in k.6 (2017 04:47)    LABS:                        8.6    5.5   )-----------( 175      ( 2017 06:58 )             26.2         134<L>  |  96  |  79<H>  ----------------------------<  118<H>  4.5   |  25  |  5.89<H>    Ca    8.5      2017 06:58  Phos  2.8         I&O's Detail  I & Os for 24h ending 2017 07:00  =============================================  IN:    Oral Fluid: 1420 ml    Total IN: 1420 ml  ---------------------------------------------  OUT:    Total OUT: 0 ml  ---------------------------------------------  Total NET: 1420 ml    I & Os for current day (as of 2017 09:38)  =============================================  IN:    Oral Fluid: 100 ml    Total IN: 100 ml  ---------------------------------------------  OUT:    Total OUT: 0 ml  ---------------------------------------------  Total NET: 100 ml        Care Discussed with Consultants/Other Providers [ ] YES  [ ] NO 45yo male w/ PMH HFrEF (17% 17), CAD s/p CABG x4 2016, ICM s/p recent AICD 2017, DM2 c/b peripheral neuropathy w/ toe ulcers presents with 6 days of progressive swelling, orthopnea, dyspnea w GIN on CKD5 requiring initiation of HD and control of HTN  Presenting today post dialysis sitting in chair . Pt stated I feel 100% better pt mildly breathless with talking . remains vol overloaded       PAST MEDICAL & SURGICAL HISTORY:  Acute on chronic systolic heart failure  Coronary artery disease  Foot ulcer due to secondary DM  Diabetes mellitus: type 2  AICD (automatic cardioverter/defibrillator) present  S/P CABG (coronary artery bypass graft)  Toe amputation status, left  Breast Reduction: at age 18      MEDICATIONS  (STANDING):  atorvastatin 40milliGRAM(s) Oral at bedtime  ferrous    sulfate 325milliGRAM(s) Oral two times a day with meals  heparin  Injectable 5000Unit(s) SubCutaneous every 8 hours  dextrose 50% Injectable 12.5Gram(s) IV Push once  dextrose 50% Injectable 25Gram(s) IV Push once  dextrose 50% Injectable 25Gram(s) IV Push once  carvedilol 3.125milliGRAM(s) Oral every 12 hours  dextrose 50% Injectable 50milliLiter(s) IV Push once  aspirin enteric coated 81milliGRAM(s) Oral daily  hydrALAZINE 100milliGRAM(s) Oral three times a day  isosorbide   dinitrate Tablet (ISORDIL) 20milliGRAM(s) Oral three times a day  insulin lispro (HumaLOG) corrective regimen sliding scale  SubCutaneous three times a day before meals  insulin lispro (HumaLOG) corrective regimen sliding scale  SubCutaneous at bedtime  valsartan 40milliGRAM(s) Oral two times a day  torsemide 60milliGRAM(s) Oral daily  calcium acetate 667milliGRAM(s) Oral three times a day with meals  Nephrocaps 1Capsule(s) Oral daily  AQUAPHOR (petrolatum Ointment) 1Application(s) Topical two times a day  epoetin arian Injectable [id number](s) IV Push <User Schedule>      REVIEW OF SYSTEMS:    Vital Signs Last 24 Hrs  T(C): 37.1, Max: 37.1 ( @ 04:47)  T(F): 98.7, Max: 98.7 ( @ 04:47)  HR: 89 (89 - 96)  BP: 129/69 (129/69 - 145/75)  BP(mean): --  RR: 18 (18 - 20)  SpO2: 94% (91% - 94%)    PHYSICAL EXAM:  GENERAL: NAD, well-groomed, well-developed  HEAD:  Atraumatic, Normocephalic  EYES: EOMI, PERRLA, conjunctiva and sclera clear  ENMT: No tonsillar erythema, exudates, or enlargement; Moist mucous membranes, Good dentition, No lesions  NECK: Supple, moderate  JVD, Normal thyroid  NERVOUS SYSTEM:  Alert & Oriented X3, Good concentration; Motor Strength 5/5 B/L upper and lower extremities; DTRs 2+ intact and symmetric  CHEST/LUNG: Clear to percussion bilaterally; No rales, rhonchi, wheezing, or rubs  HEART: Regular rate and rhythm; No murmurs, rubs, or gallops  ABDOMEN: Soft, Nontender, + bm distended; Bowel sounds present  EXTREMITIES:  2+ Peripheral Pulses, 2+ pitting  Edema to thighs bilat   LYMPH: No lymphadenopathy noted  SKIN: No rashes or lesions    Daily     Daily Weight in k (2017 04:47) weight 6/8= 120 admit weight 115    LABS:                        8.6    5.5   )-----------( 175      ( 2017 06:58 )             26.2         134<L>  |  96  |  79<H>  ----------------------------<  118<H>  4.5   |  25  |  5.89<H>    Ca    8.5      2017 06:58  Phos  2.8     0609    I&O's Detail  I & Os for 24h ending 2017 07:00  =============================================  IN:    Oral Fluid: 1420 ml    Total IN: 1420 ml  ---------------------------------------------  OUT:    Total OUT: 0 ml  ---------------------------------------------  Total NET: 1420 ml    I & Os for current day (as of 2017 09:38)  =============================================  IN:    Oral Fluid: 100 ml    Total IN: 100 ml  ---------------------------------------------  OUT:    Total OUT: 0 ml  ---------------------------------------------  Total NET: 100 ml            Care Discussed with Consultants/Other Providers x 47yo male w/ PMH HFrEF (17% 17), CAD s/p CABG x4 2016, ICM s/p recent AICD 2017, DM2 c/b peripheral neuropathy w/ toe ulcers presents with 6 days of progressive swelling, orthopnea, dyspnea w GIN on CKD5 requiring initiation of HD and control of HTN  Presenting today post dialysis sitting in chair . Pt stated I feel 100% better pt mildly breathless with talking . remains vol overloaded       PAST MEDICAL & SURGICAL HISTORY:  Acute on chronic systolic heart failure  Coronary artery disease  Foot ulcer due to secondary DM  Diabetes mellitus: type 2  AICD (automatic cardioverter/defibrillator) present  S/P CABG (coronary artery bypass graft)  Toe amputation status, left  Breast Reduction: at age 18      MEDICATIONS  (STANDING):  atorvastatin 40milliGRAM(s) Oral at bedtime  ferrous    sulfate 325milliGRAM(s) Oral two times a day with meals  heparin  Injectable 5000Unit(s) SubCutaneous every 8 hours  dextrose 50% Injectable 12.5Gram(s) IV Push once  dextrose 50% Injectable 25Gram(s) IV Push once  dextrose 50% Injectable 25Gram(s) IV Push once  carvedilol 3.125milliGRAM(s) Oral every 12 hours  dextrose 50% Injectable 50milliLiter(s) IV Push once  aspirin enteric coated 81milliGRAM(s) Oral daily  hydrALAZINE 100milliGRAM(s) Oral three times a day  isosorbide   dinitrate Tablet (ISORDIL) 20milliGRAM(s) Oral three times a day  insulin lispro (HumaLOG) corrective regimen sliding scale  SubCutaneous three times a day before meals  insulin lispro (HumaLOG) corrective regimen sliding scale  SubCutaneous at bedtime  valsartan 40milliGRAM(s) Oral two times a day  torsemide 60milliGRAM(s) Oral daily  calcium acetate 667milliGRAM(s) Oral three times a day with meals  Nephrocaps 1Capsule(s) Oral daily  AQUAPHOR (petrolatum Ointment) 1Application(s) Topical two times a day  epoetin arian Injectable [id number](s) IV Push <User Schedule>    ome Medications:   * Patient Currently Takes Medications as of 03-May-2017 17:29 documented in Prescription Writer  · 	ferrous sulfate 325 mg (65 mg elemental iron) oral tablet: 1 tab(s) orally 2 times a day  · 	Januvia 100 mg oral tablet: 1 tab(s) orally once a day  · 	Keflex 250 mg oral capsule: 1 cap(s) orally every 12 hours stop after last dose on May 5th  · 	Keflex 250 mg oral capsule: 1 cap(s) orally every 12 hours stop after last dose on May 5th  · 	hydrALAZINE 25 mg oral tablet: 3 tab(s) orally 3 times a day  · 	isosorbide dinitrate 10 mg oral tablet: 1 tab(s) orally 3 times a day  · 	atorvastatin 40 mg oral tablet: 1 tab(s) orally once a day (at bedtime)  · 	aspirin 81 mg oral tablet, chewable: 1 tab(s) orally once a day  · 	carvedilol 3.125 mg oral tablet: 1 tab(s) orally every 12 hours  · 	amLODIPine 10 mg oral tablet: 1 tab(s) orally once a day        REVIEW OF SYSTEMS:    Vital Signs Last 24 Hrs  T(C): 37.1, Max: 37.1 ( @ 04:47)  T(F): 98.7, Max: 98.7 ( @ 04:47)  HR: 89 (89 - 96)  BP: 129/69 (129/69 - 145/75)  BP(mean): --  RR: 18 (18 - 20)  SpO2: 94% (91% - 94%)    PHYSICAL EXAM:  GENERAL: NAD, well-groomed, well-developed  HEAD:  Atraumatic, Normocephalic  EYES: EOMI, PERRLA, conjunctiva and sclera clear  ENMT: No tonsillar erythema, exudates, or enlargement; Moist mucous membranes, Good dentition, No lesions  NECK: Supple, moderate  JVD, Normal thyroid  NERVOUS SYSTEM:  Alert & Oriented X3, Good concentration; Motor Strength 5/5 B/L upper and lower extremities; DTRs 2+ intact and symmetric  CHEST/LUNG: Clear to percussion bilaterally; No rales, rhonchi, wheezing, or rubs  HEART: Regular rate and rhythm; No murmurs, rubs, or gallops  ABDOMEN: Soft, Nontender, + bm distended; Bowel sounds present  EXTREMITIES:  2+ Peripheral Pulses, 2+ pitting  Edema to thighs bilat   LYMPH: No lymphadenopathy noted  SKIN: No rashes or lesions    Daily     Daily Weight in k (2017 04:47) weight 6/8= 120 admit weight 115    LABS:                        8.6    5.5   )-----------( 175      ( 2017 06:58 )             26.2     -    134<L>  |  96  |  79<H>  ----------------------------<  118<H>  4.5   |  25  |  5.89<H>    Ca    8.5      2017 06:58  Phos  2.8     06-09    I&O's Detail  I & Os for 24h ending 2017 07:00  =============================================  IN:    Oral Fluid: 1420 ml    Total IN: 1420 ml  ---------------------------------------------  OUT:    Total OUT: 0 ml  ---------------------------------------------  Total NET: 1420 ml    I & Os for current day (as of 2017 09:38)  =============================================  IN:    Oral Fluid: 100 ml    Total IN: 100 ml  ---------------------------------------------  OUT:    Total OUT: 0 ml  ---------------------------------------------  Total NET: 100 ml            Care Discussed with Consultants/Other Providers x

## 2017-06-09 NOTE — PROGRESS NOTE ADULT - SUBJECTIVE AND OBJECTIVE BOX
Patient is a 47y Male  being evaluated for   ESRD  feels better  no more lightheadedness    PHYSICAL EXAM:  Vital Signs Last 24 Hrs  T(C): 37.1, Max: 37.1 (06-09 @ 04:47)  T(F): 98.7, Max: 98.7 (06-09 @ 04:47)  HR: 89 (89 - 96)  BP: 129/69 (109/64 - 145/75)  BP(mean): --  RR: 18 (18 - 20)  SpO2: 94% (91% - 94%)  Constitutional: no acute distress  HEENT:  NC, ext ears nl, oropharynx clear,  nose nl  Neck: No JVD, bruit, adenopathy or thyromegaly  Eyes: PERRL, no discharge, no icterus  Respiratory: cta/p bilat, no wheezes, rales, nl effort  Cardiovascular: regular rate, S1 and S2 no rub or gallop  Abd: : BS+, soft, NT , no rebound or guarding, no bruits  Extremities: + Rt permcath site clean and dry-2+ edema,no  cyanosis, palpable pulses  Neurological: A/O x 3, nl coordination and tone    I and O's:  I & Os for current day (as of 06-08 @ 09:53)  =============================================  IN: 480 ml / OUT: 2200 ml / NET: -1720 ml            REVIEW OF SYSTEMS:  CONSTITUTIONAL: No weakness, fevers or chills  RESPIRATORY: No cough, wheezing, hemoptysis; No shortness of breath  CARDIOVASCULAR: No chest pain or palpitations  GI : no abd pain, nausea or vomiting  GENITOURINARY: No dysuria, frequency or hematuria  All other review of systems is negative unless indicated above.    Allergies    No Known Allergies    Intolerances          MEDICATIONS  (STANDING):  atorvastatin 40milliGRAM(s) Oral at bedtime  ferrous    sulfate 325milliGRAM(s) Oral two times a day with meals  heparin  Injectable 5000Unit(s) SubCutaneous every 8 hours  dextrose 50% Injectable 12.5Gram(s) IV Push once  dextrose 50% Injectable 25Gram(s) IV Push once  dextrose 50% Injectable 25Gram(s) IV Push once  carvedilol 3.125milliGRAM(s) Oral every 12 hours  dextrose 50% Injectable 50milliLiter(s) IV Push once  aspirin enteric coated 81milliGRAM(s) Oral daily  hydrALAZINE 100milliGRAM(s) Oral three times a day  isosorbide   dinitrate Tablet (ISORDIL) 20milliGRAM(s) Oral three times a day  insulin lispro (HumaLOG) corrective regimen sliding scale  SubCutaneous three times a day before meals  insulin lispro (HumaLOG) corrective regimen sliding scale  SubCutaneous at bedtime  valsartan 40milliGRAM(s) Oral two times a day  torsemide 60milliGRAM(s) Oral daily  calcium acetate 667milliGRAM(s) Oral three times a day with meals  Nephrocaps 1Capsule(s) Oral daily  AQUAPHOR (petrolatum Ointment) 1Application(s) Topical two times a day  epoetin arian Injectable [id number](s) IV Push <User Schedule>      Sodium,Serum 133    06-08 @ 05:59  Sodium,Serum 132    06-07 @ 06:46  Sodium,Serum 132    06-06 @ 07:40  Sodium,Serum 129    06-05 @ 07:18    Potassium,Serum 4.4    06-08 @ 05:59  Potassium,Serum 4.3    06-07 @ 06:46  Potassium,Serum 4.4    06-06 @ 07:40  Potassium,Serum 4.2    06-05 @ 07:18    Chloride,Serum 94    06-08 @ 05:59  Chloride,Serum 92    06-07 @ 06:46  Chloride,Serum 95    06-06 @ 07:40  Chloride,Serum 89    06-05 @ 07:18    CO2, Serum 24    06-08 @ 05:59  CO2, Serum 22    06-07 @ 06:46  CO2, Serum 19    06-06 @ 07:40  CO2, Serum 20    06-05 @ 07:18    BUN 63    06-08 @ 05:59  BUN 82    06-07 @ 06:46  BUN 70    06-06 @ 07:40  BUN 85    06-05 @ 07:18    Creatinine, Serum 4.84    06-08 @ 05:59  Creatinine, Serum 5.45    06-07 @ 06:46  Creatinine, Serum 4.67    06-06 @ 07:40  Creatinine, Serum 5.10    06-05 @ 07:18      06-08    133<L>  |  94<L>  |  63<H>  ----------------------------<  120<H>  4.4   |  24  |  4.84<H>    Ca    8.1<L>      08 Jun 2017 05:59                              8.2    5.4   )-----------( 154      ( 08 Jun 2017 05:59 )             24.3

## 2017-06-10 LAB
ANION GAP SERPL CALC-SCNC: 13 MMOL/L — SIGNIFICANT CHANGE UP (ref 5–17)
BUN SERPL-MCNC: 57 MG/DL — HIGH (ref 7–23)
CALCIUM SERPL-MCNC: 8.4 MG/DL — SIGNIFICANT CHANGE UP (ref 8.4–10.5)
CHLORIDE SERPL-SCNC: 95 MMOL/L — LOW (ref 96–108)
CO2 SERPL-SCNC: 27 MMOL/L — SIGNIFICANT CHANGE UP (ref 22–31)
CREAT SERPL-MCNC: 4.84 MG/DL — HIGH (ref 0.5–1.3)
GLUCOSE SERPL-MCNC: 110 MG/DL — HIGH (ref 70–99)
HCT VFR BLD CALC: 25.5 % — LOW (ref 39–50)
HGB BLD-MCNC: 8.2 G/DL — LOW (ref 13–17)
MCHC RBC-ENTMCNC: 30.2 PG — SIGNIFICANT CHANGE UP (ref 27–34)
MCHC RBC-ENTMCNC: 32.2 GM/DL — SIGNIFICANT CHANGE UP (ref 32–36)
MCV RBC AUTO: 93.8 FL — SIGNIFICANT CHANGE UP (ref 80–100)
PLATELET # BLD AUTO: 182 K/UL — SIGNIFICANT CHANGE UP (ref 150–400)
POTASSIUM SERPL-MCNC: 4.2 MMOL/L — SIGNIFICANT CHANGE UP (ref 3.5–5.3)
POTASSIUM SERPL-SCNC: 4.2 MMOL/L — SIGNIFICANT CHANGE UP (ref 3.5–5.3)
RBC # BLD: 2.72 M/UL — LOW (ref 4.2–5.8)
RBC # FLD: 15.7 % — HIGH (ref 10.3–14.5)
SODIUM SERPL-SCNC: 135 MMOL/L — SIGNIFICANT CHANGE UP (ref 135–145)
WBC # BLD: 5.1 K/UL — SIGNIFICANT CHANGE UP (ref 3.8–10.5)
WBC # FLD AUTO: 5.1 K/UL — SIGNIFICANT CHANGE UP (ref 3.8–10.5)

## 2017-06-10 RX ADMIN — Medication 1: at 13:24

## 2017-06-10 RX ADMIN — Medication 60 MILLIGRAM(S): at 06:12

## 2017-06-10 RX ADMIN — Medication 325 MILLIGRAM(S): at 20:20

## 2017-06-10 RX ADMIN — VALSARTAN 40 MILLIGRAM(S): 80 TABLET ORAL at 06:11

## 2017-06-10 RX ADMIN — HEPARIN SODIUM 5000 UNIT(S): 5000 INJECTION INTRAVENOUS; SUBCUTANEOUS at 06:12

## 2017-06-10 RX ADMIN — Medication 1: at 20:21

## 2017-06-10 RX ADMIN — Medication 100 MILLIGRAM(S): at 13:24

## 2017-06-10 RX ADMIN — HEPARIN SODIUM 5000 UNIT(S): 5000 INJECTION INTRAVENOUS; SUBCUTANEOUS at 21:58

## 2017-06-10 RX ADMIN — Medication 81 MILLIGRAM(S): at 13:23

## 2017-06-10 RX ADMIN — ISOSORBIDE DINITRATE 20 MILLIGRAM(S): 5 TABLET ORAL at 21:58

## 2017-06-10 RX ADMIN — ISOSORBIDE DINITRATE 20 MILLIGRAM(S): 5 TABLET ORAL at 13:24

## 2017-06-10 RX ADMIN — VALSARTAN 40 MILLIGRAM(S): 80 TABLET ORAL at 20:20

## 2017-06-10 RX ADMIN — Medication 1 APPLICATION(S): at 06:12

## 2017-06-10 RX ADMIN — ATORVASTATIN CALCIUM 40 MILLIGRAM(S): 80 TABLET, FILM COATED ORAL at 21:58

## 2017-06-10 RX ADMIN — Medication 667 MILLIGRAM(S): at 13:23

## 2017-06-10 RX ADMIN — Medication 1 APPLICATION(S): at 20:20

## 2017-06-10 RX ADMIN — ISOSORBIDE DINITRATE 20 MILLIGRAM(S): 5 TABLET ORAL at 06:12

## 2017-06-10 RX ADMIN — Medication 1 CAPSULE(S): at 13:24

## 2017-06-10 RX ADMIN — Medication 667 MILLIGRAM(S): at 20:20

## 2017-06-10 RX ADMIN — Medication 667 MILLIGRAM(S): at 09:30

## 2017-06-10 RX ADMIN — Medication 100 MILLIGRAM(S): at 06:12

## 2017-06-10 RX ADMIN — HEPARIN SODIUM 5000 UNIT(S): 5000 INJECTION INTRAVENOUS; SUBCUTANEOUS at 13:24

## 2017-06-10 RX ADMIN — CARVEDILOL PHOSPHATE 3.12 MILLIGRAM(S): 80 CAPSULE, EXTENDED RELEASE ORAL at 06:12

## 2017-06-10 RX ADMIN — Medication 100 MILLIGRAM(S): at 21:57

## 2017-06-10 RX ADMIN — CARVEDILOL PHOSPHATE 3.12 MILLIGRAM(S): 80 CAPSULE, EXTENDED RELEASE ORAL at 20:20

## 2017-06-10 RX ADMIN — Medication 325 MILLIGRAM(S): at 09:30

## 2017-06-10 NOTE — PROGRESS NOTE ADULT - PROBLEM SELECTOR PLAN 1
-remains grossly volume up- minimal urine output, would discontinue torsemide. Would need coordination with nephrology for more aggressive HD/UF for fluid removal.   -cont' hydralazine 100 TID and Isordil 20 TID with valsartan 40mg  -cont' carvediolol 3.125mg BID- not in low flow state, but would not uptitrate betablocker for now as would leave room for adequate BP for aggressive fluid removal.   -cont' ASA 81mg and atorvastatin 40mg for secondary prevention  -patient with CKD, now ESRD, would need both heart + kidney transplant evaluation, blood type A. -remains grossly volume up- minimal urine output, would discontinue torsemide. Would need coordination with nephrology for more aggressive HD/UF for fluid removal, goal at least >2L net negative daily.   -cont' hydralazine 100 TID and Isordil 20 TID with valsartan 40mg  -cont' carvediolol 3.125mg BID- not in low flow state, but would not uptitrate betablocker for now as would leave room for adequate BP for aggressive fluid removal.   -cont' ASA 81mg and atorvastatin 40mg for secondary prevention  -patient with CKD, now ESRD, would need both heart + kidney transplant evaluation, blood type A.

## 2017-06-10 NOTE — PROGRESS NOTE ADULT - PROBLEM SELECTOR PLAN 1
continue Rx as per heart failure team  telemetry  emphasized need for continuation of treatment/more aggressive fluid removal

## 2017-06-10 NOTE — PROGRESS NOTE ADULT - ASSESSMENT
46M CAD/CABG x4 (2016), ICM/HFrEF (17%) s/p AICD, DM2, CKD p/w ADHF and worsening renal function on HD.

## 2017-06-10 NOTE — PROGRESS NOTE ADULT - SUBJECTIVE AND OBJECTIVE BOX
Patient is a 47y old  Male who presents with a chief complaint of acute on chronic heart failure, acute renal failure (31 May 2017 09:48)      SUBJECTIVE / OVERNIGHT EVENTS: Still with sob, fatigue. Anxious to be DC.  Review of Systems  chest pain no  palpitations no  sob yes  nausea no  headache no    MEDICATIONS  (STANDING):  atorvastatin 40milliGRAM(s) Oral at bedtime  ferrous    sulfate 325milliGRAM(s) Oral two times a day with meals  heparin  Injectable 5000Unit(s) SubCutaneous every 8 hours  dextrose 50% Injectable 12.5Gram(s) IV Push once  dextrose 50% Injectable 25Gram(s) IV Push once  dextrose 50% Injectable 25Gram(s) IV Push once  carvedilol 3.125milliGRAM(s) Oral every 12 hours  dextrose 50% Injectable 50milliLiter(s) IV Push once  aspirin enteric coated 81milliGRAM(s) Oral daily  hydrALAZINE 100milliGRAM(s) Oral three times a day  isosorbide   dinitrate Tablet (ISORDIL) 20milliGRAM(s) Oral three times a day  insulin lispro (HumaLOG) corrective regimen sliding scale  SubCutaneous three times a day before meals  insulin lispro (HumaLOG) corrective regimen sliding scale  SubCutaneous at bedtime  valsartan 40milliGRAM(s) Oral two times a day  torsemide 60milliGRAM(s) Oral daily  calcium acetate 667milliGRAM(s) Oral three times a day with meals  Nephrocaps 1Capsule(s) Oral daily  AQUAPHOR (petrolatum Ointment) 1Application(s) Topical two times a day  epoetin arian Injectable [id number](s) IV Push <User Schedule>    MEDICATIONS  (PRN):  acetaminophen   Tablet 650milliGRAM(s) Oral every 6 hours PRN For Temp greater than 38.5 C (101.3 F)  acetaminophen   Tablet. 650milliGRAM(s) Oral every 6 hours PRN mild-moderate pain  dextrose Gel 1Dose(s) Oral once PRN Blood Glucose LESS THAN 70 milliGRAM(s)/deciliter  glucagon  Injectable 1milliGRAM(s) IntraMuscular once PRN Glucose LESS THAN 70 milligrams/deciliter      Vital Signs Last 24 Hrs  T(C): 36.9, Max: 36.9 (06-10 @ 05:49)  HR: 89 (71 - 98)  BP: 122/72 (118/64 - 138/69)  RR: 18 (18 - 18)  SpO2: 98% (91% - 98%)  Wt(kg): --  CAPILLARY BLOOD GLUCOSE  115 (10 Thomas 2017 09:00)  160 (09 Jun 2017 21:57)  158 (09 Jun 2017 17:59)  155 (09 Jun 2017 13:39)    I&O's Summary    I & Os for current day (as of 10 Thomas 2017 10:19)  =============================================  IN: 1140 ml / OUT: 2500 ml / NET: -1360 ml      PHYSICAL EXAM:  GENERAL: NAD, well-developed  HEAD:  Atraumatic, Normocephalic  EYES: EOMI, PERRLA, conjunctiva and sclera clear  NECK: Supple, No JVD  CHEST/LUNG: Clear to auscultation bilaterally; No wheeze  HEART: Regular rate and rhythm; No murmurs, rubs, or gallops  ABDOMEN: Soft, Nontender, Nondistended; Bowel sounds present  EXTREMITIES:  2+ Peripheral Pulses, No clubbing, cyanosis, or edema  PSYCH: AAOx3  NEUROLOGY: non-focal  SKIN: No rashes or lesions    LABS:                        8.2    5.1   )-----------( 182      ( 10 Thomas 2017 08:29 )             25.5     06-10    135  |  95<L>  |  57<H>  ----------------------------<  110<H>  4.2   |  27  |  4.84<H>    Ca    8.4      10 Thomas 2017 08:29  Phos  2.8     06-09                RADIOLOGY & ADDITIONAL TESTS:    Imaging Personally Reviewed:    Consultant(s) Notes Reviewed:      Care Discussed with Consultants/Other Providers:

## 2017-06-10 NOTE — PROGRESS NOTE ADULT - ATTENDING COMMENTS
47ys HFrEF EF 17%, ESRD recently started on HD.   Currently receiving QOD HD but still grossly overloaded.   118kg (no significant weight loss)   MEDS: HDZN 100 tid, ISDN 20 TID, Torsemide 60, Coreg 3.125  -140 SR 80 47ys HFrEF EF 17%, ESRD recently started on HD.   Currently receiving QOD HD but still grossly overloaded.   118kg (no significant weight loss)   MEDS: HDZN 100 tid, ISDN 20 TID, Torsemide 60, Coreg 3.125  -140 SR 80 gross volume overload  No clear progress.  Needs daily HD for volume removal.  Please consider moving patient to CCU for CVHHD or other renal replacement strategy.  Patients family has reached out to [facility] for elective heart kidney tranplant evaluation.  Fuad Carrera

## 2017-06-10 NOTE — PROGRESS NOTE ADULT - SUBJECTIVE AND OBJECTIVE BOX
Cardiology fellow weekend coverage: Cash Rivera ([phone]), after hours please call [phone]     Interval history:    ROS:    Tele: sinus 80-90s      Review Of Systems:  Constitutional: [ ] Fever [ ] Chills [ ] Fatigue [ ] Weight change   HEENT: [ ] Blurred vision [ ] Eye Pain [ ] Headache [ ] Runny nose [ ] Sore Throat   Respiratory: [ ] Cough [ ] Wheezing [ ] Shortness of breath  Cardiovascular: [ ] Chest Pain [ ] Palpitations [ ] BRUCE [ ] PND [ ] Orthopnea  Gastrointestinal: [ ] Abdominal Pain [ ] Diarrhea [ ] Constipation [ ] Hemorrhoids [ ] Nausea [ ] Vomiting  Genitourinary: [ ] Nocturia [ ] Dysuria [ ] Incontinence  Extremities: [ ] Swelling [ ] Joint Pain  Neurologic: [ ] Focal deficit [ ] Paresthesias [ ] Syncope  Lymphatic: [ ] Swelling [ ] Lymphadenopathy   Skin: [ ] Rash [ ] Ecchymoses [ ] Wounds [ ] Lesions  Psychiatry: [ ] Depression [ ] Suicidal/Homicidal Ideation [ ] Anxiety [ ] Sleep Disturbances  [ ] 10 point review of systems is otherwise negative except as mentioned above            [ ]Unable to obtain    Medications:  atorvastatin 40milliGRAM(s) Oral at bedtime  ferrous    sulfate 325milliGRAM(s) Oral two times a day with meals  heparin  Injectable 5000Unit(s) SubCutaneous every 8 hours  acetaminophen   Tablet 650milliGRAM(s) Oral every 6 hours PRN  acetaminophen   Tablet. 650milliGRAM(s) Oral every 6 hours PRN  dextrose Gel 1Dose(s) Oral once PRN  dextrose 50% Injectable 12.5Gram(s) IV Push once  dextrose 50% Injectable 25Gram(s) IV Push once  dextrose 50% Injectable 25Gram(s) IV Push once  glucagon  Injectable 1milliGRAM(s) IntraMuscular once PRN  carvedilol 3.125milliGRAM(s) Oral every 12 hours  dextrose 50% Injectable 50milliLiter(s) IV Push once  aspirin enteric coated 81milliGRAM(s) Oral daily  hydrALAZINE 100milliGRAM(s) Oral three times a day  isosorbide   dinitrate Tablet (ISORDIL) 20milliGRAM(s) Oral three times a day  insulin lispro (HumaLOG) corrective regimen sliding scale  SubCutaneous three times a day before meals  insulin lispro (HumaLOG) corrective regimen sliding scale  SubCutaneous at bedtime  valsartan 40milliGRAM(s) Oral two times a day  torsemide 60milliGRAM(s) Oral daily  calcium acetate 667milliGRAM(s) Oral three times a day with meals  Nephrocaps 1Capsule(s) Oral daily  AQUAPHOR (petrolatum Ointment) 1Application(s) Topical two times a day  epoetin arian Injectable 35491Doxd(s) IV Push <User Schedule>    Vitals:  T(C): 36.9, Max: 36.9 (06-10 @ 05:49)  HR: 89 (71 - 98)  BP: 122/72 (118/64 - 138/69)    Daily     Daily Weight in k.4 (10 Thomas 2017 05:49)    I&O's Summary    I & Os for current day (as of 10 Thomas 2017 09:52)  =============================================  IN: 1140 ml / OUT: 2500 ml / NET: -1360 ml      Physical Exam:  Appearance: [ ] Normal [ ] NAD  Eyes: [ ] PERRL [ ] EOMI  HENT: [ ] Normal oral muscosa [ ]NC/AT  Cardiovascular: [ ] S1 [ ] S2 [ ] RRR [ ] No m/r/g [ ]No edema [ ] JVP  Procedural Access Site: [ ] No hematoma [ ] Non-tender to palpation [ ] 2+ pulse [ ] No bruit [ ] No Ecchymosis  Respiratory: [ ] Clear to auscultation bilaterally  Gastrointestinal: [ ] Soft [ ] Non-tender [ ] Non-distended [ ] BS+  Musculoskeletal: [ ] No clubbing [ ] No joint deformity   Neurologic: [ ] Non-focal  Lymphatic: [ ] No lymphadenopathy  Psychiatry: [ ] AAOx3 [ ] Mood & affect appropriate  Skin: [ ] No rashes [ ] No ecchymoses [ ] No cyanosis                          8.2    5.1   )-----------( 182      ( 10 Thomas 2017 08:29 )             25.5     06-10    135  |  95<L>  |  57<H>  ----------------------------<  110<H>  4.2   |  27  |  4.84<H>    Ca    8.4      10 Thomas 2017 08:29  Phos  2.8     06-09                        -----------------------------------------------------------------  [ ] Congestive Heart Failure                  [ ] Acute                                                [X] Acute on Chronic     [ ] Chronic  [ ] Diastolic (HFpEF)  [ ] Systolic (HFrEF)  [ ] Combined Systolic & Diastolic      [X] ACC/AHA Stage: __C___     [X] NYHA Class: __III___  -----------------------------------------------------------------  [ ] GIN                                                    [ ] CKD I  [ ] ATN                                                  [ ] CKD II  [ ] Other: _____                                  [ ] CKD III                                                                [ ] CKD IV                                                                [ ] CKD V                                                                [X] ESRD  -----------------------------------------------------------------  Abnormal Nutritional Status:  [ ] Malnutrtion (see nutrition note)  [ ] Underweight: BMI < 19  [ ] Morbid Obeisty: BMI >/ 40  [ ] Cachexia  [ ] Other Cardiology fellow weekend coverage: Cash Rivera ([phone]), after hours please call [phone]     Interval history: had 7th session of HD yesterday with 2500ml removal, last prior HD was , patient feeling frustrated for slow process in adequate fluid removal, ambulated earlier with walker with shortness of breath. Makes minimal amount of urine throughout the day.     ROS: dyspnea on exertion, no chest pain, denies dizzinesss    Tele: sinus 80-90s        Medications:  atorvastatin 40milliGRAM(s) Oral at bedtime  ferrous    sulfate 325milliGRAM(s) Oral two times a day with meals  heparin  Injectable 5000Unit(s) SubCutaneous every 8 hours  acetaminophen   Tablet 650milliGRAM(s) Oral every 6 hours PRN  acetaminophen   Tablet. 650milliGRAM(s) Oral every 6 hours PRN  dextrose Gel 1Dose(s) Oral once PRN  dextrose 50% Injectable 12.5Gram(s) IV Push once  dextrose 50% Injectable 25Gram(s) IV Push once  dextrose 50% Injectable 25Gram(s) IV Push once  glucagon  Injectable 1milliGRAM(s) IntraMuscular once PRN  carvedilol 3.125milliGRAM(s) Oral every 12 hours  dextrose 50% Injectable 50milliLiter(s) IV Push once  aspirin enteric coated 81milliGRAM(s) Oral daily  hydrALAZINE 100milliGRAM(s) Oral three times a day  isosorbide   dinitrate Tablet (ISORDIL) 20milliGRAM(s) Oral three times a day  insulin lispro (HumaLOG) corrective regimen sliding scale  SubCutaneous three times a day before meals  insulin lispro (HumaLOG) corrective regimen sliding scale  SubCutaneous at bedtime  valsartan 40milliGRAM(s) Oral two times a day  torsemide 60milliGRAM(s) Oral daily  calcium acetate 667milliGRAM(s) Oral three times a day with meals  Nephrocaps 1Capsule(s) Oral daily  AQUAPHOR (petrolatum Ointment) 1Application(s) Topical two times a day  epoetin arian Injectable 45542Lzqf(s) IV Push <User Schedule>    Vitals:  T(C): 36.9, Max: 36.9 (06-10 @ 05:49)  HR: 89 (71 - 98)  BP: 122/72 (118/64 - 138/69)    Daily     Daily Weight in k.4 (10 Thomas 2017 05:49)    I&O's Summary    I & Os for current day (as of 10 Thomas 2017 09:52)  =============================================  IN: 1140 ml / OUT: 2500 ml / NET: -1360 ml      Physical Exam:  Appearance: [ ] Normal [X] NAD  Eyes: [ ] PERRL [ ] EOMI  HENT: [ ] Normal oral muscosa [X]NC/AT  Cardiovascular: [X] S1 [X] S2 [X] RRR [ ] No m/r/g +3 pitting edema to thigh, +JVD >10cm  Procedural Access Site: [ ] No hematoma [ ] Non-tender to palpation [ ] 2+ pulse [ ] No bruit [ ] No Ecchymosis  Respiratory: [ ] Clear to auscultation bilaterally  Gastrointestinal: [ ] Soft [ ] Non-tender [ ] Non-distended [ ] BS+  Musculoskeletal:                             8.2    5.1   )-----------( 182      ( 10 Thomas 2017 08:29 )             25.5     06-10    135  |  95<L>  |  57<H>  ----------------------------<  110<H>  4.2   |  27  |  4.84<H>    Ca    8.4      10 Thomas 2017 08:29  Phos  2.8     06-09                        -----------------------------------------------------------------  [ ] Congestive Heart Failure                  [ ] Acute                                                [X] Acute on Chronic     [ ] Chronic  [ ] Diastolic (HFpEF)  [ ] Systolic (HFrEF)  [ ] Combined Systolic & Diastolic      [X] ACC/AHA Stage: __C___     [X] NYHA Class: __III___  -----------------------------------------------------------------  [ ] GIN                                                    [ ] CKD I  [ ] ATN                                                  [ ] CKD II  [ ] Other: _____                                  [ ] CKD III                                                                [ ] CKD IV                                                                [ ] CKD V                                                                [X] ESRD  -----------------------------------------------------------------  Abnormal Nutritional Status:  [ ] Malnutrtion (see nutrition note)  [ ] Underweight: BMI < 19  [ ] Morbid Obeisty: BMI >/ 40  [ ] Cachexia  [ ] Other

## 2017-06-10 NOTE — PROGRESS NOTE ADULT - SUBJECTIVE AND OBJECTIVE BOX
Subjective  Feels better but still with LE edema  Tolerated HD yesterday    PHYSICAL EXAM:  Vitals:  T(F): 98.4, Max: 98.4 (06-10 @ 05:49)  HR: 89  BP: 122/72  BP(mean): --  RR: 18  SpO2: 98%  Wt(kg): --  Constitutional: no acute distress  HEENT:  NC, ext ears nl, oropharynx clear,  nose nl  Neck: No JVD, bruit, adenopathy or thyromegaly  Eyes: PERRL, no discharge, no icterus  Respiratory: cta/p bilat, no wheezes, rales, nl effort  Cardiovascular: regular rate, S1 and S2 no rub or gallop  Abd: : BS+, soft, NT , no rebound or guarding, no bruits  Extremities: +2 edema or cyanosis, palpable pulses-+bruit AVF  Neurological: A/O x 3, nl coordination and tone    I and O's:    I & Os for current day (as of 06-10 @ 10:17)  =============================================  IN: 1140 ml / OUT: 2500 ml / NET: -1360 ml          REVIEW OF SYSTEMS:  CONSTITUTIONAL: No weakness, fevers or chills  RESPIRATORY: No cough, wheezing, hemoptysis; No shortness of breath  CARDIOVASCULAR: No chest pain or palpitations  GI : no abd pains, nausea or vomiting  GENITOURINARY: No dysuria, frequency or hematuria  All other review of systems is negative unless indicated above.    Allergies    No Known Allergies    Intolerances        MEDICATIONS  (STANDING):  atorvastatin 40milliGRAM(s) Oral at bedtime  ferrous    sulfate 325milliGRAM(s) Oral two times a day with meals  heparin  Injectable 5000Unit(s) SubCutaneous every 8 hours  dextrose 50% Injectable 12.5Gram(s) IV Push once  dextrose 50% Injectable 25Gram(s) IV Push once  dextrose 50% Injectable 25Gram(s) IV Push once  carvedilol 3.125milliGRAM(s) Oral every 12 hours  dextrose 50% Injectable 50milliLiter(s) IV Push once  aspirin enteric coated 81milliGRAM(s) Oral daily  hydrALAZINE 100milliGRAM(s) Oral three times a day  isosorbide   dinitrate Tablet (ISORDIL) 20milliGRAM(s) Oral three times a day  insulin lispro (HumaLOG) corrective regimen sliding scale  SubCutaneous three times a day before meals  insulin lispro (HumaLOG) corrective regimen sliding scale  SubCutaneous at bedtime  valsartan 40milliGRAM(s) Oral two times a day  torsemide 60milliGRAM(s) Oral daily  calcium acetate 667milliGRAM(s) Oral three times a day with meals  Nephrocaps 1Capsule(s) Oral daily  AQUAPHOR (petrolatum Ointment) 1Application(s) Topical two times a day  epoetin arian Injectable 90149Qkek(s) IV Push <User Schedule>      Sodium,Serum 135    06-10 @ 08:29  Sodium,Serum 134    06-09 @ 06:58  Sodium,Serum 133    06-08 @ 05:59  Sodium,Serum 132    06-07 @ 06:46    Potassium,Serum 4.2    06-10 @ 08:29  Potassium,Serum 4.5    06-09 @ 06:58  Potassium,Serum 4.4    06-08 @ 05:59  Potassium,Serum 4.3    06-07 @ 06:46    Chloride,Serum 95    06-10 @ 08:29  Chloride,Serum 96    06-09 @ 06:58  Chloride,Serum 94    06-08 @ 05:59  Chloride,Serum 92    06-07 @ 06:46    CO2, Serum 27    06-10 @ 08:29  CO2, Serum 25    06-09 @ 06:58  CO2, Serum 24    06-08 @ 05:59  CO2, Serum 22    06-07 @ 06:46    BUN 57    06-10 @ 08:29  BUN 79    06-09 @ 06:58  BUN 63    06-08 @ 05:59  BUN 82    06-07 @ 06:46    Creatinine, Serum 4.84    06-10 @ 08:29  Creatinine, Serum 5.89    06-09 @ 06:58  Creatinine, Serum 4.84    06-08 @ 05:59  Creatinine, Serum 5.45    06-07 @ 06:46      06-10    135  |  95<L>  |  57<H>  ----------------------------<  110<H>  4.2   |  27  |  4.84<H>    Ca    8.4      10 Jun 2017 08:29  Phos  2.8     06-09        Urine Studies:      Urine chemistry:   Urine Na:   Urine Creatinine:   Urine Protein/Cr ratio:  Urine K:   Urine Osm:   24 Hr urine studies:

## 2017-06-10 NOTE — PROGRESS NOTE ADULT - ASSESSMENT
ESRD-still total body fluid overloaded  PO fluid restriction stressed  HD Monday  Looking for outpatient HD slot

## 2017-06-11 LAB
ANION GAP SERPL CALC-SCNC: 16 MMOL/L — SIGNIFICANT CHANGE UP (ref 5–17)
BUN SERPL-MCNC: 71 MG/DL — HIGH (ref 7–23)
CALCIUM SERPL-MCNC: 8.2 MG/DL — LOW (ref 8.4–10.5)
CHLORIDE SERPL-SCNC: 95 MMOL/L — LOW (ref 96–108)
CO2 SERPL-SCNC: 24 MMOL/L — SIGNIFICANT CHANGE UP (ref 22–31)
CREAT SERPL-MCNC: 5.61 MG/DL — HIGH (ref 0.5–1.3)
GLUCOSE SERPL-MCNC: 133 MG/DL — HIGH (ref 70–99)
HCT VFR BLD CALC: 25.3 % — LOW (ref 39–50)
HGB BLD-MCNC: 8.2 G/DL — LOW (ref 13–17)
MCHC RBC-ENTMCNC: 30.2 PG — SIGNIFICANT CHANGE UP (ref 27–34)
MCHC RBC-ENTMCNC: 32.3 GM/DL — SIGNIFICANT CHANGE UP (ref 32–36)
MCV RBC AUTO: 93.6 FL — SIGNIFICANT CHANGE UP (ref 80–100)
PLATELET # BLD AUTO: 167 K/UL — SIGNIFICANT CHANGE UP (ref 150–400)
POTASSIUM SERPL-MCNC: 4.7 MMOL/L — SIGNIFICANT CHANGE UP (ref 3.5–5.3)
POTASSIUM SERPL-SCNC: 4.7 MMOL/L — SIGNIFICANT CHANGE UP (ref 3.5–5.3)
RBC # BLD: 2.71 M/UL — LOW (ref 4.2–5.8)
RBC # FLD: 15.8 % — HIGH (ref 10.3–14.5)
SODIUM SERPL-SCNC: 135 MMOL/L — SIGNIFICANT CHANGE UP (ref 135–145)
WBC # BLD: 5.7 K/UL — SIGNIFICANT CHANGE UP (ref 3.8–10.5)
WBC # FLD AUTO: 5.7 K/UL — SIGNIFICANT CHANGE UP (ref 3.8–10.5)

## 2017-06-11 RX ADMIN — HEPARIN SODIUM 5000 UNIT(S): 5000 INJECTION INTRAVENOUS; SUBCUTANEOUS at 05:58

## 2017-06-11 RX ADMIN — ATORVASTATIN CALCIUM 40 MILLIGRAM(S): 80 TABLET, FILM COATED ORAL at 22:29

## 2017-06-11 RX ADMIN — HEPARIN SODIUM 5000 UNIT(S): 5000 INJECTION INTRAVENOUS; SUBCUTANEOUS at 22:29

## 2017-06-11 RX ADMIN — Medication 100 MILLIGRAM(S): at 22:29

## 2017-06-11 RX ADMIN — CARVEDILOL PHOSPHATE 3.12 MILLIGRAM(S): 80 CAPSULE, EXTENDED RELEASE ORAL at 05:57

## 2017-06-11 RX ADMIN — Medication 1 APPLICATION(S): at 18:07

## 2017-06-11 RX ADMIN — Medication 325 MILLIGRAM(S): at 18:07

## 2017-06-11 RX ADMIN — Medication 325 MILLIGRAM(S): at 09:14

## 2017-06-11 RX ADMIN — VALSARTAN 40 MILLIGRAM(S): 80 TABLET ORAL at 05:58

## 2017-06-11 RX ADMIN — HEPARIN SODIUM 5000 UNIT(S): 5000 INJECTION INTRAVENOUS; SUBCUTANEOUS at 13:19

## 2017-06-11 RX ADMIN — Medication 667 MILLIGRAM(S): at 13:19

## 2017-06-11 RX ADMIN — Medication 667 MILLIGRAM(S): at 09:14

## 2017-06-11 RX ADMIN — Medication 2: at 13:26

## 2017-06-11 RX ADMIN — ISOSORBIDE DINITRATE 20 MILLIGRAM(S): 5 TABLET ORAL at 22:29

## 2017-06-11 RX ADMIN — VALSARTAN 40 MILLIGRAM(S): 80 TABLET ORAL at 18:09

## 2017-06-11 RX ADMIN — Medication 100 MILLIGRAM(S): at 13:19

## 2017-06-11 RX ADMIN — Medication 667 MILLIGRAM(S): at 18:08

## 2017-06-11 RX ADMIN — ISOSORBIDE DINITRATE 20 MILLIGRAM(S): 5 TABLET ORAL at 13:20

## 2017-06-11 RX ADMIN — ISOSORBIDE DINITRATE 20 MILLIGRAM(S): 5 TABLET ORAL at 05:57

## 2017-06-11 RX ADMIN — Medication 1 APPLICATION(S): at 06:02

## 2017-06-11 RX ADMIN — Medication 81 MILLIGRAM(S): at 11:36

## 2017-06-11 RX ADMIN — Medication 60 MILLIGRAM(S): at 05:58

## 2017-06-11 RX ADMIN — CARVEDILOL PHOSPHATE 3.12 MILLIGRAM(S): 80 CAPSULE, EXTENDED RELEASE ORAL at 18:08

## 2017-06-11 RX ADMIN — Medication 1 CAPSULE(S): at 11:36

## 2017-06-11 RX ADMIN — Medication 100 MILLIGRAM(S): at 05:57

## 2017-06-11 NOTE — PROGRESS NOTE ADULT - SUBJECTIVE AND OBJECTIVE BOX
Patient is a 47y old  Male who presents with a chief complaint of acute on chronic heart failure, acute renal failure (31 May 2017 09:48)      SUBJECTIVE / OVERNIGHT EVENTS: No new complaints.  Review of Systems  chest pain no  palpitations no  sob yes  nausea no  headache no    MEDICATIONS  (STANDING):  atorvastatin 40milliGRAM(s) Oral at bedtime  ferrous    sulfate 325milliGRAM(s) Oral two times a day with meals  heparin  Injectable 5000Unit(s) SubCutaneous every 8 hours  dextrose 50% Injectable 12.5Gram(s) IV Push once  dextrose 50% Injectable 25Gram(s) IV Push once  dextrose 50% Injectable 25Gram(s) IV Push once  carvedilol 3.125milliGRAM(s) Oral every 12 hours  dextrose 50% Injectable 50milliLiter(s) IV Push once  aspirin enteric coated 81milliGRAM(s) Oral daily  hydrALAZINE 100milliGRAM(s) Oral three times a day  isosorbide   dinitrate Tablet (ISORDIL) 20milliGRAM(s) Oral three times a day  insulin lispro (HumaLOG) corrective regimen sliding scale  SubCutaneous three times a day before meals  insulin lispro (HumaLOG) corrective regimen sliding scale  SubCutaneous at bedtime  valsartan 40milliGRAM(s) Oral two times a day  torsemide 60milliGRAM(s) Oral daily  calcium acetate 667milliGRAM(s) Oral three times a day with meals  Nephrocaps 1Capsule(s) Oral daily  AQUAPHOR (petrolatum Ointment) 1Application(s) Topical two times a day  epoetin arian Injectable 08000Fwml(s) IV Push <User Schedule>    MEDICATIONS  (PRN):  acetaminophen   Tablet 650milliGRAM(s) Oral every 6 hours PRN For Temp greater than 38.5 C (101.3 F)  acetaminophen   Tablet. 650milliGRAM(s) Oral every 6 hours PRN mild-moderate pain  dextrose Gel 1Dose(s) Oral once PRN Blood Glucose LESS THAN 70 milliGRAM(s)/deciliter  glucagon  Injectable 1milliGRAM(s) IntraMuscular once PRN Glucose LESS THAN 70 milligrams/deciliter      Vital Signs Last 24 Hrs  T(C): 36.8, Max: 36.8 (06-10 @ 17:30)  HR: 82 (82 - 100)  BP: 132/71 (118/69 - 138/76)  RR: 18 (18 - 18)  SpO2: 94% (94% - 98%)  Wt(kg): --  CAPILLARY BLOOD GLUCOSE  122 (11 Jun 2017 08:25)  174 (10 Thomas 2017 21:46)  160 (10 Thomas 2017 19:40)  192 (10 Thomas 2017 12:48)    I&O's Summary    I & Os for current day (as of 11 Jun 2017 12:18)  =============================================  IN: 1000 ml / OUT: 3400 ml / NET: -2400 ml      PHYSICAL EXAM:  GENERAL: NAD, well-developed  HEAD:  Atraumatic, Normocephalic  EYES: EOMI, PERRLA, conjunctiva and sclera clear  NECK: Supple, No JVD  CHEST/LUNG: Clear to auscultation bilaterally; No wheeze  HEART: Regular rate and rhythm; No murmurs, rubs, or gallops  ABDOMEN: Soft, Nontender, Nondistended; Bowel sounds present  EXTREMITIES:  2+ Peripheral Pulses, No clubbing, cyanosis, or edema  PSYCH: AAOx3  NEUROLOGY: non-focal  SKIN: No rashes or lesions    LABS:                        8.2    5.7   )-----------( 167      ( 11 Jun 2017 06:56 )             25.3     06-11    135  |  95<L>  |  71<H>  ----------------------------<  133<H>  4.7   |  24  |  5.61<H>    Ca    8.2<L>      11 Jun 2017 06:56                RADIOLOGY & ADDITIONAL TESTS:    Imaging Personally Reviewed:    Consultant(s) Notes Reviewed:      Care Discussed with Consultants/Other Providers:

## 2017-06-11 NOTE — PROGRESS NOTE ADULT - ASSESSMENT
ESRD-improved but still total body fluid overloaded  Dietary fluid restriction stressed  HD tomorrow- D/C planning in progress

## 2017-06-11 NOTE — PROGRESS NOTE ADULT - ASSESSMENT
47yo male w/ PMH HFrEF (17% 4/26/17), CAD s/p CABG x4 2016, ICM s/p recent AICD 5/2017, DM2 c/b peripheral neuropathy w/ toe ulcers presents with 6 days of progressive swelling, orthopnea, dyspnea w GIN on CKD5 requiring initiation of HD and control of HTN  Discharge planning in progress with possible follow in [facility] .

## 2017-06-11 NOTE — PROGRESS NOTE ADULT - SUBJECTIVE AND OBJECTIVE BOX
Subjective  3 liters of fluid removed with HD yesterday      PHYSICAL EXAM:  Vitals:  T(F): 98.3, Max: 98.3 (06-10 @ 17:30)  HR: 82  BP: 132/71  BP(mean): --  RR: 18  SpO2: 94%  Wt(kg): --  Constitutional: no acute distress  HEENT:  NC, ext ears nl, oropharynx clear,  nose nl  Neck: No JVD, bruit, adenopathy or thyromegaly  Eyes: PERRL, no discharge, no icterus  Respiratory: cta/p bilat, no wheezes, rales, nl effort  Cardiovascular: regular rate, S1 and S2 no rub or gallop  Abd: : BS+, soft, NT , no rebound or guarding, no bruits  Extremities: +3 edema or cyanosis, palpable pulses-+bruit  Neurological: A/O x 3, nl coordination and tone    I and O's:    I & Os for current day (as of 06-11 @ 10:35)  =============================================  IN: 1000 ml / OUT: 3400 ml / NET: -2400 ml          REVIEW OF SYSTEMS:  CONSTITUTIONAL: No weakness, fevers or chills  RESPIRATORY: No cough, wheezing, hemoptysis; No shortness of breath  CARDIOVASCULAR: No chest pain or palpitations  GI : no abd pains, nausea or vomiting  GENITOURINARY: No dysuria, frequency or hematuria  All other review of systems is negative unless indicated above.    Allergies    No Known Allergies    Intolerances        MEDICATIONS  (STANDING):  atorvastatin 40milliGRAM(s) Oral at bedtime  ferrous    sulfate 325milliGRAM(s) Oral two times a day with meals  heparin  Injectable 5000Unit(s) SubCutaneous every 8 hours  dextrose 50% Injectable 12.5Gram(s) IV Push once  dextrose 50% Injectable 25Gram(s) IV Push once  dextrose 50% Injectable 25Gram(s) IV Push once  carvedilol 3.125milliGRAM(s) Oral every 12 hours  dextrose 50% Injectable 50milliLiter(s) IV Push once  aspirin enteric coated 81milliGRAM(s) Oral daily  hydrALAZINE 100milliGRAM(s) Oral three times a day  isosorbide   dinitrate Tablet (ISORDIL) 20milliGRAM(s) Oral three times a day  insulin lispro (HumaLOG) corrective regimen sliding scale  SubCutaneous three times a day before meals  insulin lispro (HumaLOG) corrective regimen sliding scale  SubCutaneous at bedtime  valsartan 40milliGRAM(s) Oral two times a day  torsemide 60milliGRAM(s) Oral daily  calcium acetate 667milliGRAM(s) Oral three times a day with meals  Nephrocaps 1Capsule(s) Oral daily  AQUAPHOR (petrolatum Ointment) 1Application(s) Topical two times a day  epoetin arian Injectable 77679Xope(s) IV Push <User Schedule>      Sodium,Serum 135    06-11 @ 06:56  Sodium,Serum 135    06-10 @ 08:29  Sodium,Serum 134    06-09 @ 06:58  Sodium,Serum 133    06-08 @ 05:59    Potassium,Serum 4.7    06-11 @ 06:56  Potassium,Serum 4.2    06-10 @ 08:29  Potassium,Serum 4.5    06-09 @ 06:58  Potassium,Serum 4.4    06-08 @ 05:59    Chloride,Serum 95    06-11 @ 06:56  Chloride,Serum 95    06-10 @ 08:29  Chloride,Serum 96    06-09 @ 06:58  Chloride,Serum 94    06-08 @ 05:59    CO2, Serum 24    06-11 @ 06:56  CO2, Serum 27    06-10 @ 08:29  CO2, Serum 25    06-09 @ 06:58  CO2, Serum 24    06-08 @ 05:59    BUN 71    06-11 @ 06:56  BUN 57    06-10 @ 08:29  BUN 79    06-09 @ 06:58  BUN 63    06-08 @ 05:59    Creatinine, Serum 5.61    06-11 @ 06:56  Creatinine, Serum 4.84    06-10 @ 08:29  Creatinine, Serum 5.89    06-09 @ 06:58  Creatinine, Serum 4.84    06-08 @ 05:59      06-11    135  |  95<L>  |  71<H>  ----------------------------<  133<H>  4.7   |  24  |  5.61<H>    Ca    8.2<L>      11 Jun 2017 06:56        Urine Studies:      Urine chemistry:   Urine Na:   Urine Creatinine:   Urine Protein/Cr ratio:  Urine K:   Urine Osm:   24 Hr urine studies:

## 2017-06-12 DIAGNOSIS — N18.6 END STAGE RENAL DISEASE: ICD-10-CM

## 2017-06-12 DIAGNOSIS — I25.5 ISCHEMIC CARDIOMYOPATHY: ICD-10-CM

## 2017-06-12 LAB
ANION GAP SERPL CALC-SCNC: 18 MMOL/L — HIGH (ref 5–17)
APTT BLD: 29.1 SEC — SIGNIFICANT CHANGE UP (ref 27.5–37.4)
BUN SERPL-MCNC: 89 MG/DL — HIGH (ref 7–23)
CALCIUM SERPL-MCNC: 8.2 MG/DL — LOW (ref 8.4–10.5)
CHLORIDE SERPL-SCNC: 94 MMOL/L — LOW (ref 96–108)
CO2 SERPL-SCNC: 26 MMOL/L — SIGNIFICANT CHANGE UP (ref 22–31)
CREAT SERPL-MCNC: 6.5 MG/DL — HIGH (ref 0.5–1.3)
GLUCOSE SERPL-MCNC: 148 MG/DL — HIGH (ref 70–99)
HCT VFR BLD CALC: 24 % — LOW (ref 39–50)
HCT VFR BLD CALC: 25.2 % — LOW (ref 39–50)
HGB BLD-MCNC: 8 G/DL — LOW (ref 13–17)
HGB BLD-MCNC: 8.1 G/DL — LOW (ref 13–17)
INR BLD: 1.16 RATIO — SIGNIFICANT CHANGE UP (ref 0.88–1.16)
MAGNESIUM SERPL-MCNC: 2.4 MG/DL — SIGNIFICANT CHANGE UP (ref 1.6–2.6)
MCHC RBC-ENTMCNC: 30.5 PG — SIGNIFICANT CHANGE UP (ref 27–34)
MCHC RBC-ENTMCNC: 31.5 PG — SIGNIFICANT CHANGE UP (ref 27–34)
MCHC RBC-ENTMCNC: 32.2 GM/DL — SIGNIFICANT CHANGE UP (ref 32–36)
MCHC RBC-ENTMCNC: 33.4 GM/DL — SIGNIFICANT CHANGE UP (ref 32–36)
MCV RBC AUTO: 94.4 FL — SIGNIFICANT CHANGE UP (ref 80–100)
MCV RBC AUTO: 94.7 FL — SIGNIFICANT CHANGE UP (ref 80–100)
PHOSPHATE SERPL-MCNC: 2.8 MG/DL — SIGNIFICANT CHANGE UP (ref 2.5–4.5)
PLATELET # BLD AUTO: 152 K/UL — SIGNIFICANT CHANGE UP (ref 150–400)
PLATELET # BLD AUTO: 179 K/UL — SIGNIFICANT CHANGE UP (ref 150–400)
POTASSIUM SERPL-MCNC: 4.8 MMOL/L — SIGNIFICANT CHANGE UP (ref 3.5–5.3)
POTASSIUM SERPL-SCNC: 4.8 MMOL/L — SIGNIFICANT CHANGE UP (ref 3.5–5.3)
PROTHROM AB SERPL-ACNC: 12.7 SEC — SIGNIFICANT CHANGE UP (ref 9.8–12.7)
RBC # BLD: 2.55 M/UL — LOW (ref 4.2–5.8)
RBC # BLD: 2.66 M/UL — LOW (ref 4.2–5.8)
RBC # FLD: 16.4 % — HIGH (ref 10.3–14.5)
RBC # FLD: 16.4 % — HIGH (ref 10.3–14.5)
SODIUM SERPL-SCNC: 138 MMOL/L — SIGNIFICANT CHANGE UP (ref 135–145)
WBC # BLD: 5.1 K/UL — SIGNIFICANT CHANGE UP (ref 3.8–10.5)
WBC # BLD: 5.7 K/UL — SIGNIFICANT CHANGE UP (ref 3.8–10.5)
WBC # FLD AUTO: 5.1 K/UL — SIGNIFICANT CHANGE UP (ref 3.8–10.5)
WBC # FLD AUTO: 5.7 K/UL — SIGNIFICANT CHANGE UP (ref 3.8–10.5)

## 2017-06-12 PROCEDURE — 99233 SBSQ HOSP IP/OBS HIGH 50: CPT

## 2017-06-12 RX ORDER — IRON SUCROSE 20 MG/ML
100 INJECTION, SOLUTION INTRAVENOUS
Qty: 0 | Refills: 0 | Status: DISCONTINUED | OUTPATIENT
Start: 2017-06-12 | End: 2017-06-27

## 2017-06-12 RX ADMIN — HEPARIN SODIUM 5000 UNIT(S): 5000 INJECTION INTRAVENOUS; SUBCUTANEOUS at 13:06

## 2017-06-12 RX ADMIN — Medication 325 MILLIGRAM(S): at 07:39

## 2017-06-12 RX ADMIN — Medication 667 MILLIGRAM(S): at 13:06

## 2017-06-12 RX ADMIN — ERYTHROPOIETIN 10000 UNIT(S): 10000 INJECTION, SOLUTION INTRAVENOUS; SUBCUTANEOUS at 10:34

## 2017-06-12 RX ADMIN — ISOSORBIDE DINITRATE 20 MILLIGRAM(S): 5 TABLET ORAL at 13:06

## 2017-06-12 RX ADMIN — Medication 1 CAPSULE(S): at 13:06

## 2017-06-12 RX ADMIN — ATORVASTATIN CALCIUM 40 MILLIGRAM(S): 80 TABLET, FILM COATED ORAL at 21:39

## 2017-06-12 RX ADMIN — Medication 100 MILLIGRAM(S): at 21:39

## 2017-06-12 RX ADMIN — Medication 1 APPLICATION(S): at 18:27

## 2017-06-12 RX ADMIN — CARVEDILOL PHOSPHATE 3.12 MILLIGRAM(S): 80 CAPSULE, EXTENDED RELEASE ORAL at 18:28

## 2017-06-12 RX ADMIN — Medication 325 MILLIGRAM(S): at 18:28

## 2017-06-12 RX ADMIN — IRON SUCROSE 210 MILLIGRAM(S): 20 INJECTION, SOLUTION INTRAVENOUS at 10:35

## 2017-06-12 RX ADMIN — Medication 2: at 18:28

## 2017-06-12 RX ADMIN — Medication 81 MILLIGRAM(S): at 13:05

## 2017-06-12 RX ADMIN — VALSARTAN 40 MILLIGRAM(S): 80 TABLET ORAL at 18:28

## 2017-06-12 RX ADMIN — Medication 100 MILLIGRAM(S): at 13:06

## 2017-06-12 RX ADMIN — Medication 667 MILLIGRAM(S): at 18:28

## 2017-06-12 RX ADMIN — Medication 1 APPLICATION(S): at 06:32

## 2017-06-12 RX ADMIN — Medication 667 MILLIGRAM(S): at 07:39

## 2017-06-12 RX ADMIN — ISOSORBIDE DINITRATE 20 MILLIGRAM(S): 5 TABLET ORAL at 21:39

## 2017-06-12 RX ADMIN — HEPARIN SODIUM 5000 UNIT(S): 5000 INJECTION INTRAVENOUS; SUBCUTANEOUS at 06:22

## 2017-06-12 RX ADMIN — Medication 60 MILLIGRAM(S): at 13:05

## 2017-06-12 NOTE — PROGRESS NOTE ADULT - ASSESSMENT
47 year old man with ischemic cardiomyopathy in the context of poorly controlled hypertension and poorly controlled diabetes mellitus. He developed progressive acute on chronic stage V CKD leading to severe volume overload. He is hemodynamically stable, but continues to be hypertensive.

## 2017-06-12 NOTE — PROGRESS NOTE ADULT - PROBLEM SELECTOR PLAN 1
As he is not urinating to any significant degree, discontinue the torsemide.  Continue current vasodilators. If these are posing an issue with hypotension in dialysis, we can hold temporarily or reduce dose prior to HD.

## 2017-06-12 NOTE — PROGRESS NOTE ADULT - SUBJECTIVE AND OBJECTIVE BOX
45yo male w/ PMH HFrEF (17% 17), CAD s/p CABG x4 , ICM s/p recent AICD 2017, DM2 p/w ADHF and worsening renal function on HD. Presenting today remains serve vol overload . Pt stated "I still feel very bloated "       PAST MEDICAL & SURGICAL HISTORY:  Acute on chronic systolic heart failure  Coronary artery disease  Foot ulcer due to secondary DM  Diabetes mellitus: type 2  AICD (automatic cardioverter/defibrillator) present  S/P CABG (coronary artery bypass graft)  Toe amputation status, left  Breast Reduction: at age 18      MEDICATIONS  (STANDING):  atorvastatin 40milliGRAM(s) Oral at bedtime  ferrous    sulfate 325milliGRAM(s) Oral two times a day with meals  heparin  Injectable 5000Unit(s) SubCutaneous every 8 hours  dextrose 50% Injectable 12.5Gram(s) IV Push once  dextrose 50% Injectable 25Gram(s) IV Push once  dextrose 50% Injectable 25Gram(s) IV Push once  carvedilol 3.125milliGRAM(s) Oral every 12 hours  dextrose 50% Injectable 50milliLiter(s) IV Push once  aspirin enteric coated 81milliGRAM(s) Oral daily  hydrALAZINE 100milliGRAM(s) Oral three times a day  isosorbide   dinitrate Tablet (ISORDIL) 20milliGRAM(s) Oral three times a day  insulin lispro (HumaLOG) corrective regimen sliding scale  SubCutaneous three times a day before meals  insulin lispro (HumaLOG) corrective regimen sliding scale  SubCutaneous at bedtime  valsartan 40milliGRAM(s) Oral two times a day  torsemide 60milliGRAM(s) Oral daily  calcium acetate 667milliGRAM(s) Oral three times a day with meals  Nephrocaps 1Capsule(s) Oral daily  AQUAPHOR (petrolatum Ointment) 1Application(s) Topical two times a day  epoetin arian Injectable 34655Cick(s) IV Push <User Schedule>  iron sucrose IVPB 100milliGRAM(s) IV Intermittent <User Schedule>    MEDICATIONS  (PRN):  acetaminophen   Tablet 650milliGRAM(s) Oral every 6 hours PRN For Temp greater than 38.5 C (101.3 F)  acetaminophen   Tablet. 650milliGRAM(s) Oral every 6 hours PRN mild-moderate pain  dextrose Gel 1Dose(s) Oral once PRN Blood Glucose LESS THAN 70 milliGRAM(s)/deciliter  glucagon  Injectable 1milliGRAM(s) IntraMuscular once PRN Glucose LESS THAN 70 milligrams/deciliter      REVIEW OF SYSTEMS:    Vital Signs Last 24 Hrs  T(C): 36.6, Max: 37 ( @ 16:10)  T(F): 97.9, Max: 98.6 ( @ 16:10)  HR: 70 (70 - 99)  BP: 136/76 (126/76 - 136/76)  BP(mean): --  RR: 18 (18 - 18)  SpO2: 92% (92% - 97%)    PHYSICAL EXAM:  GENERAL: NAD, well-groomed, well-developed  HEAD:  Atraumatic, Normocephalic  EYES: EOMI, PERRLA, conjunctiva and sclera clear  ENMT: No tonsillar erythema, exudates, or enlargement; Moist mucous membranes, Good dentition, No lesions  NECK: Supple ,mod-. serve  JVD,   NERVOUS SYSTEM:  Alert & Oriented X3, Good concentration; Motor Strength 5/5 B/L upper and lower extremities; DTRs 2+ intact and symmetric  CHEST/LUNG: Clear to percussion bilaterally; No rales, rhonchi, wheezing, or rubs  HEART: Regular rate and rhythm; No murmurs, rubs, or gallops  ABDOMEN: Soft, Nontender,distended; Bowel sounds present  EXTREMITIES:  2+ Peripheral Pulses, 3 + pitting edema  bilateral extremities to thighs SKIN: No rashes or lesions      I & Os for current day (as of  @ 10:28)  =============================================  IN: 890 ml / OUT: 0 ml / NET: 890 ml      LABS:                          8.1    5.7   )-----------( 179      ( 2017 09:09 )             25.2     06-12    138  |  94<L>  |  89<H>  ----------------------------<  148<H>  4.8   |  26  |  6.50<H>    Ca    8.2<L>      2017 09:09  Phos  2.8     06-12  Mg     2.4     06-12    Daily     Daily Weight in k.2 (2017 08:30)    weight 6/10 121       RADIOLOGY & ADDITIONAL TESTS:    Assessment: 47ys HFrEF EF 17%, ESRD recently started on HD.    PENDING ATTENDING REVIEW    Plan: Dialysis daily if possible   increase isordil to 30 mg TID   continue present med regime       Care Discussed with Consultants/Other Providers [ ] YES  [ ] NO He remains volume overload. He does feel that he is making some progress.        PAST MEDICAL & SURGICAL HISTORY:  Acute on chronic systolic heart failure  Coronary artery disease  Foot ulcer due to secondary DM  Diabetes mellitus: type 2  AICD (automatic cardioverter/defibrillator) present  S/P CABG (coronary artery bypass graft)  Toe amputation status, left  Breast Reduction: at age 18      MEDICATIONS  (STANDING):  atorvastatin 40milliGRAM(s) Oral at bedtime  ferrous    sulfate 325milliGRAM(s) Oral two times a day with meals  heparin  Injectable 5000Unit(s) SubCutaneous every 8 hours  dextrose 50% Injectable 12.5Gram(s) IV Push once  dextrose 50% Injectable 25Gram(s) IV Push once  dextrose 50% Injectable 25Gram(s) IV Push once  carvedilol 3.125milliGRAM(s) Oral every 12 hours  dextrose 50% Injectable 50milliLiter(s) IV Push once  aspirin enteric coated 81milliGRAM(s) Oral daily  hydrALAZINE 100milliGRAM(s) Oral three times a day  isosorbide   dinitrate Tablet (ISORDIL) 20milliGRAM(s) Oral three times a day  insulin lispro (HumaLOG) corrective regimen sliding scale  SubCutaneous three times a day before meals  insulin lispro (HumaLOG) corrective regimen sliding scale  SubCutaneous at bedtime  valsartan 40milliGRAM(s) Oral two times a day  torsemide 60milliGRAM(s) Oral daily  calcium acetate 667milliGRAM(s) Oral three times a day with meals  Nephrocaps 1Capsule(s) Oral daily  AQUAPHOR (petrolatum Ointment) 1Application(s) Topical two times a day  epoetin arian Injectable [id number](s) IV Push <User Schedule>  iron sucrose IVPB 100milliGRAM(s) IV Intermittent <User Schedule>    MEDICATIONS  (PRN):  acetaminophen   Tablet 650milliGRAM(s) Oral every 6 hours PRN For Temp greater than 38.5 C (101.3 F)  acetaminophen   Tablet. 650milliGRAM(s) Oral every 6 hours PRN mild-moderate pain  dextrose Gel 1Dose(s) Oral once PRN Blood Glucose LESS THAN 70 milliGRAM(s)/deciliter  glucagon  Injectable 1milliGRAM(s) IntraMuscular once PRN Glucose LESS THAN 70 milligrams/deciliter      REVIEW OF SYSTEMS:    Vital Signs Last 24 Hrs  T(C): 36.6, Max: 37 ( @ 16:10)  T(F): 97.9, Max: 98.6 ( @ 16:10)  HR: 70 (70 - 99)  BP: 136/76 (126/76 - 136/76)  BP(mean): --  RR: 18 (18 - 18)  SpO2: 92% (92% - 97%)    PHYSICAL EXAM:  GENERAL: NAD, well-groomed, well-developed  HEAD:  Atraumatic, Normocephalic  EYES: EOMI, PERRLA, conjunctiva and sclera clear  ENMT: No tonsillar erythema, exudates, or enlargement; Moist mucous membranes, Good dentition, No lesions  NECK: Supple ,mod-. serve  JVD,   NERVOUS SYSTEM:  Alert & Oriented X3, Good concentration; Motor Strength 5/5 B/L upper and lower extremities; DTRs 2+ intact and symmetric  CHEST/LUNG: Clear to percussion bilaterally; No rales, rhonchi, wheezing, or rubs  HEART: Regular rate and rhythm; No murmurs, rubs, or gallops  ABDOMEN: Soft, Nontender,distended; Bowel sounds present  EXTREMITIES:  2+ Peripheral Pulses, 3 + pitting edema  bilateral extremities to thighs SKIN: No rashes or lesions      I & Os for current day (as of  @ 10:28)  =============================================  IN: 890 ml / OUT: 0 ml / NET: 890 ml      LABS:                          8.1    5.7   )-----------( 179      ( 2017 09:09 )             25.2     06-12    138  |  94<L>  |  89<H>  ----------------------------<  148<H>  4.8   |  26  |  6.50<H>    Ca    8.2<L>      2017 09:09  Phos  2.8     06-12  Mg     2.4     06-12    Daily     Daily Weight in k.2 (2017 08:30)    weight 6/10 121

## 2017-06-12 NOTE — PROGRESS NOTE ADULT - PROBLEM SELECTOR PLAN 3
He needs daily volume removal with ultrafiltration in order to make significant progress. We will discuss with his nephrologist.

## 2017-06-12 NOTE — PROGRESS NOTE ADULT - ASSESSMENT
Imp-  47y Male with ESRD  CMP  fluid overload  anemia of CKD - cont procrit  hyperphosphatemia improved  HD today with UF maximized as bp allows

## 2017-06-12 NOTE — PROGRESS NOTE ADULT - ASSESSMENT
47yo male w/ PMH HFrEF (17% 4/26/17), CAD s/p CABG x4 2016, ICM s/p recent AICD 5/2017, DM2 c/b peripheral neuropathy w/ toe ulcers presents with 6 days of progressive swelling, orthopnea, dyspnea w GIN on CKD5 requiring initiation of HD and control of HTN  Discharge planning in progress with possible follow in [facility] .

## 2017-06-12 NOTE — PROGRESS NOTE ADULT - SUBJECTIVE AND OBJECTIVE BOX
Patient is a 47y old  Male who presents with a chief complaint of acute on chronic heart failure, acute renal failure (31 May 2017 09:48)      INTERVAL HPI/OVERNIGHT EVENTS: weak, tired after HD  T(C): 36.3, Max: 36.8 (06-12 @ 04:05)  HR: 92 (70 - 92)  BP: 141/83 (124/79 - 141/83)  RR: 18 (16 - 18)  SpO2: 96% (92% - 97%)  Wt(kg): --  I&O's Summary  I & Os for 24h ending 12 Jun 2017 07:00  =============================================  IN: 890 ml / OUT: 0 ml / NET: 890 ml    I & Os for current day (as of 12 Jun 2017 17:23)  =============================================  IN: 540 ml / OUT: 3000 ml / NET: -2460 ml      PAST MEDICAL & SURGICAL HISTORY:  Acute on chronic systolic heart failure  Coronary artery disease  Foot ulcer due to secondary DM  Diabetes mellitus: type 2  AICD (automatic cardioverter/defibrillator) present  S/P CABG (coronary artery bypass graft)  Toe amputation status, left  Breast Reduction: at age 18      SOCIAL HISTORY  Alcohol:  Tobacco:  Illicit substance use:      FAMILY HISTORY:      LABS:                        8.1    5.7   )-----------( 179      ( 12 Jun 2017 09:09 )             25.2     06-12    138  |  94<L>  |  89<H>  ----------------------------<  148<H>  4.8   |  26  |  6.50<H>    Ca    8.2<L>      12 Jun 2017 09:09  Phos  2.8     06-12  Mg     2.4     06-12          CAPILLARY BLOOD GLUCOSE  124 (12 Jun 2017 12:49)  121 (12 Jun 2017 07:13)  131 (11 Jun 2017 22:20)  144 (11 Jun 2017 18:13)            MEDICATIONS  (STANDING):  atorvastatin 40milliGRAM(s) Oral at bedtime  ferrous    sulfate 325milliGRAM(s) Oral two times a day with meals  heparin  Injectable 5000Unit(s) SubCutaneous every 8 hours  dextrose 50% Injectable 12.5Gram(s) IV Push once  dextrose 50% Injectable 25Gram(s) IV Push once  dextrose 50% Injectable 25Gram(s) IV Push once  carvedilol 3.125milliGRAM(s) Oral every 12 hours  dextrose 50% Injectable 50milliLiter(s) IV Push once  aspirin enteric coated 81milliGRAM(s) Oral daily  hydrALAZINE 100milliGRAM(s) Oral three times a day  isosorbide   dinitrate Tablet (ISORDIL) 20milliGRAM(s) Oral three times a day  insulin lispro (HumaLOG) corrective regimen sliding scale  SubCutaneous three times a day before meals  insulin lispro (HumaLOG) corrective regimen sliding scale  SubCutaneous at bedtime  valsartan 40milliGRAM(s) Oral two times a day  torsemide 60milliGRAM(s) Oral daily  calcium acetate 667milliGRAM(s) Oral three times a day with meals  Nephrocaps 1Capsule(s) Oral daily  AQUAPHOR (petrolatum Ointment) 1Application(s) Topical two times a day  epoetin arian Injectable [id number](s) IV Push <User Schedule>  iron sucrose IVPB 100milliGRAM(s) IV Intermittent <User Schedule>    MEDICATIONS  (PRN):  acetaminophen   Tablet 650milliGRAM(s) Oral every 6 hours PRN For Temp greater than 38.5 C (101.3 F)  acetaminophen   Tablet. 650milliGRAM(s) Oral every 6 hours PRN mild-moderate pain  dextrose Gel 1Dose(s) Oral once PRN Blood Glucose LESS THAN 70 milliGRAM(s)/deciliter  glucagon  Injectable 1milliGRAM(s) IntraMuscular once PRN Glucose LESS THAN 70 milligrams/deciliter      REVIEW OF SYSTEMS:  CONSTITUTIONAL: No fever, weight loss, or fatigue  EYES: No eye pain, visual disturbances, or discharge  ENMT:  No difficulty hearing, tinnitus, vertigo; No sinus or throat pain  NECK: No pain or stiffness  RESPIRATORY: No cough, wheezing, chills or hemoptysis; No shortness of breath  CARDIOVASCULAR: No chest pain, palpitations, dizziness, or leg swelling  GASTROINTESTINAL: No abdominal or epigastric pain. No nausea, vomiting, or hematemesis; No diarrhea or constipation. No melena or hematochezia.  GENITOURINARY: No dysuria, frequency, hematuria, or incontinence  NEUROLOGICAL: No headaches, memory loss, loss of strength, numbness, or tremors  SKIN: No itching, burning, rashes, or lesions   LYMPH NODES: No enlarged glands  ENDOCRINE: No heat or cold intolerance; No hair loss  MUSCULOSKELETAL: No joint pain or swelling; No muscle, back, or extremity pain  PSYCHIATRIC: No depression, anxiety, mood swings, or difficulty sleeping  HEME/LYMPH: No easy bruising, or bleeding gums  ALLERY AND IMMUNOLOGIC: No hives or eczema    RADIOLOGY & ADDITIONAL TESTS:    Imaging Personally Reviewed:  [ ] YES  [ ] NO    Consultant(s) Notes Reviewed:  [ ] YES  [ ] NO    PHYSICAL EXAM:  GENERAL: NAD, well-groomed, well-developed  HEAD:  Atraumatic, Normocephalic  EYES: EOMI, PERRLA, conjunctiva and sclera clear  ENMT: No tonsillar erythema, exudates, or enlargement; Moist mucous membranes, Good dentition, No lesions  NECK: Supple, No JVD, Normal thyroid  NERVOUS SYSTEM:  Alert & Oriented X3, Good concentration; Motor Strength 5/5 B/L upper and lower extremities; DTRs 2+ intact and symmetric  CHEST/LUNG: Clear to percussion bilaterally; No rales, rhonchi, wheezing, or rubs  HEART: Regular rate and rhythm; No murmurs, rubs, or gallops  ABDOMEN: Soft, Nontender, Nondistended; Bowel sounds present  EXTREMITIES:  2+ Peripheral Pulses, No clubbing, cyanosis, or edema  LYMPH: No lymphadenopathy noted  SKIN: No rashes or lesions    Care Discussed with Consultants/Other Providers [ ] YES  [ ] NO

## 2017-06-12 NOTE — PROGRESS NOTE ADULT - SUBJECTIVE AND OBJECTIVE BOX
Patient is a 47y Male  being evaluated for   ESRD  who reports fatigue.  Mild sob  no dizziness . No n/v        PHYSICAL EXAM:  Vitals:T(C): 36.6, Max: 37 (06-11 @ 16:10)  HR: 70 (70 - 99)  BP: 136/76 (126/76 - 136/76)  RR: 18 (18 - 18)  SpO2: 92% (92% - 97%)  Wt(kg): --    General: no acute distress  HEENT:  NC, ext ears nl, oropharynx clear,  nose nl  Neck: No JVD, bruit, adenopathy or thyromegaly  Eyes: PERRL, no discharge, no icterus  Respiratory: dec bs bases, no wheezes, rales, nl effort  Cardiovascular: regular rate, S1 and S2 no rub or gallop  Abd: : BS+, soft, NT , no rebound or guarding, no bruits  Extremities: 2+ edema, no cyanosis, palpable pulses  AV fistula with good bruit and thrill  permcath site clean      I and O's:  I & Os for current day (as of 06-12 @ 09:05)  =============================================  IN: 790 ml / OUT: 0 ml / NET: 790 ml            REVIEW OF SYSTEMS:  CONSTITUTIONAL: No  fevers or chills  RESPIRATORY: No cough, wheezing, hemoptysis  CARDIOVASCULAR: No chest pain or palpitations  GI : no abd pain, nausea or vomiting  GENITOURINARY: No dysuria, frequency or hematuria  All other review of systems is negative unless indicated above.    Allergies    No Known Allergies    Intolerances          MEDICATIONS  (STANDING):  atorvastatin 40milliGRAM(s) Oral at bedtime  ferrous    sulfate 325milliGRAM(s) Oral two times a day with meals  heparin  Injectable 5000Unit(s) SubCutaneous every 8 hours  dextrose 50% Injectable 12.5Gram(s) IV Push once  dextrose 50% Injectable 25Gram(s) IV Push once  dextrose 50% Injectable 25Gram(s) IV Push once  carvedilol 3.125milliGRAM(s) Oral every 12 hours  dextrose 50% Injectable 50milliLiter(s) IV Push once  aspirin enteric coated 81milliGRAM(s) Oral daily  hydrALAZINE 100milliGRAM(s) Oral three times a day  isosorbide   dinitrate Tablet (ISORDIL) 20milliGRAM(s) Oral three times a day  insulin lispro (HumaLOG) corrective regimen sliding scale  SubCutaneous three times a day before meals  insulin lispro (HumaLOG) corrective regimen sliding scale  SubCutaneous at bedtime  valsartan 40milliGRAM(s) Oral two times a day  torsemide 60milliGRAM(s) Oral daily  calcium acetate 667milliGRAM(s) Oral three times a day with meals  Nephrocaps 1Capsule(s) Oral daily  AQUAPHOR (petrolatum Ointment) 1Application(s) Topical two times a day  epoetin arian Injectable 07683Vopy(s) IV Push <User Schedule>      Sodium,Serum 135    06-11 @ 06:56  Sodium,Serum 135    06-10 @ 08:29  Sodium,Serum 134    06-09 @ 06:58    Potassium,Serum 4.7    06-11 @ 06:56  Potassium,Serum 4.2    06-10 @ 08:29  Potassium,Serum 4.5    06-09 @ 06:58    Chloride,Serum 95    06-11 @ 06:56  Chloride,Serum 95    06-10 @ 08:29  Chloride,Serum 96    06-09 @ 06:58    CO2, Serum 24    06-11 @ 06:56  CO2, Serum 27    06-10 @ 08:29  CO2, Serum 25    06-09 @ 06:58    BUN 71    06-11 @ 06:56  BUN 57    06-10 @ 08:29  BUN 79    06-09 @ 06:58    Creatinine, Serum 5.61    06-11 @ 06:56  Creatinine, Serum 4.84    06-10 @ 08:29  Creatinine, Serum 5.89    06-09 @ 06:58      06-11    135  |  95<L>  |  71<H>  ----------------------------<  133<H>  4.7   |  24  |  5.61<H>    Ca    8.2<L>      11 Jun 2017 06:56                              8.2    5.7   )-----------( 167      ( 11 Jun 2017 06:56 )             25.3

## 2017-06-13 DIAGNOSIS — D64.9 ANEMIA, UNSPECIFIED: ICD-10-CM

## 2017-06-13 LAB
ANION GAP SERPL CALC-SCNC: 16 MMOL/L — SIGNIFICANT CHANGE UP (ref 5–17)
BLD GP AB SCN SERPL QL: NEGATIVE — SIGNIFICANT CHANGE UP
BUN SERPL-MCNC: 69 MG/DL — HIGH (ref 7–23)
CALCIUM SERPL-MCNC: 8.6 MG/DL — SIGNIFICANT CHANGE UP (ref 8.4–10.5)
CHLORIDE SERPL-SCNC: 95 MMOL/L — LOW (ref 96–108)
CO2 SERPL-SCNC: 26 MMOL/L — SIGNIFICANT CHANGE UP (ref 22–31)
CREAT SERPL-MCNC: 5.38 MG/DL — HIGH (ref 0.5–1.3)
GLUCOSE SERPL-MCNC: 153 MG/DL — HIGH (ref 70–99)
HCT VFR BLD CALC: 27 % — LOW (ref 39–50)
HGB BLD-MCNC: 8.9 G/DL — LOW (ref 13–17)
MCHC RBC-ENTMCNC: 31.3 PG — SIGNIFICANT CHANGE UP (ref 27–34)
MCHC RBC-ENTMCNC: 33 GM/DL — SIGNIFICANT CHANGE UP (ref 32–36)
MCV RBC AUTO: 95.1 FL — SIGNIFICANT CHANGE UP (ref 80–100)
PLATELET # BLD AUTO: 177 K/UL — SIGNIFICANT CHANGE UP (ref 150–400)
POTASSIUM SERPL-MCNC: 4.7 MMOL/L — SIGNIFICANT CHANGE UP (ref 3.5–5.3)
POTASSIUM SERPL-SCNC: 4.7 MMOL/L — SIGNIFICANT CHANGE UP (ref 3.5–5.3)
RBC # BLD: 2.84 M/UL — LOW (ref 4.2–5.8)
RBC # FLD: 16.8 % — HIGH (ref 10.3–14.5)
RH IG SCN BLD-IMP: POSITIVE — SIGNIFICANT CHANGE UP
SODIUM SERPL-SCNC: 137 MMOL/L — SIGNIFICANT CHANGE UP (ref 135–145)
WBC # BLD: 5.1 K/UL — SIGNIFICANT CHANGE UP (ref 3.8–10.5)
WBC # FLD AUTO: 5.1 K/UL — SIGNIFICANT CHANGE UP (ref 3.8–10.5)

## 2017-06-13 PROCEDURE — 99233 SBSQ HOSP IP/OBS HIGH 50: CPT

## 2017-06-13 RX ADMIN — Medication 667 MILLIGRAM(S): at 17:23

## 2017-06-13 RX ADMIN — Medication 667 MILLIGRAM(S): at 07:26

## 2017-06-13 RX ADMIN — Medication 325 MILLIGRAM(S): at 07:26

## 2017-06-13 RX ADMIN — Medication 100 MILLIGRAM(S): at 22:09

## 2017-06-13 RX ADMIN — ISOSORBIDE DINITRATE 20 MILLIGRAM(S): 5 TABLET ORAL at 22:10

## 2017-06-13 RX ADMIN — Medication 1 APPLICATION(S): at 17:24

## 2017-06-13 RX ADMIN — Medication 667 MILLIGRAM(S): at 13:13

## 2017-06-13 RX ADMIN — CARVEDILOL PHOSPHATE 3.12 MILLIGRAM(S): 80 CAPSULE, EXTENDED RELEASE ORAL at 10:32

## 2017-06-13 RX ADMIN — ATORVASTATIN CALCIUM 40 MILLIGRAM(S): 80 TABLET, FILM COATED ORAL at 22:09

## 2017-06-13 RX ADMIN — ISOSORBIDE DINITRATE 20 MILLIGRAM(S): 5 TABLET ORAL at 05:20

## 2017-06-13 RX ADMIN — Medication 325 MILLIGRAM(S): at 17:23

## 2017-06-13 RX ADMIN — Medication 1 CAPSULE(S): at 13:13

## 2017-06-13 RX ADMIN — Medication 1 APPLICATION(S): at 05:20

## 2017-06-13 RX ADMIN — Medication 100 MILLIGRAM(S): at 05:20

## 2017-06-13 RX ADMIN — CARVEDILOL PHOSPHATE 3.12 MILLIGRAM(S): 80 CAPSULE, EXTENDED RELEASE ORAL at 22:09

## 2017-06-13 RX ADMIN — Medication 81 MILLIGRAM(S): at 13:13

## 2017-06-13 RX ADMIN — Medication 60 MILLIGRAM(S): at 22:10

## 2017-06-13 RX ADMIN — VALSARTAN 40 MILLIGRAM(S): 80 TABLET ORAL at 22:09

## 2017-06-13 RX ADMIN — Medication 2: at 17:23

## 2017-06-13 NOTE — PROGRESS NOTE ADULT - SUBJECTIVE AND OBJECTIVE BOX
Patient is a 47y old  Male who presents with a chief complaint of acute on chronic heart failure, acute renal failure (31 May 2017 09:48)      SUBJECTIVE / OVERNIGHT EVENTS: Cofortable, improvinng sob.  Review of Systems  chest pain no  palpitations no  sob yes  nausea no  headache no    MEDICATIONS  (STANDING):  atorvastatin 40milliGRAM(s) Oral at bedtime  ferrous    sulfate 325milliGRAM(s) Oral two times a day with meals  dextrose 50% Injectable 12.5Gram(s) IV Push once  dextrose 50% Injectable 25Gram(s) IV Push once  dextrose 50% Injectable 25Gram(s) IV Push once  carvedilol 3.125milliGRAM(s) Oral every 12 hours  dextrose 50% Injectable 50milliLiter(s) IV Push once  aspirin enteric coated 81milliGRAM(s) Oral daily  hydrALAZINE 100milliGRAM(s) Oral three times a day  isosorbide   dinitrate Tablet (ISORDIL) 20milliGRAM(s) Oral three times a day  insulin lispro (HumaLOG) corrective regimen sliding scale  SubCutaneous three times a day before meals  insulin lispro (HumaLOG) corrective regimen sliding scale  SubCutaneous at bedtime  valsartan 40milliGRAM(s) Oral two times a day  torsemide 60milliGRAM(s) Oral daily  calcium acetate 667milliGRAM(s) Oral three times a day with meals  Nephrocaps 1Capsule(s) Oral daily  AQUAPHOR (petrolatum Ointment) 1Application(s) Topical two times a day  epoetin arian Injectable [id number](s) IV Push <User Schedule>  iron sucrose IVPB 100milliGRAM(s) IV Intermittent <User Schedule>    MEDICATIONS  (PRN):  acetaminophen   Tablet 650milliGRAM(s) Oral every 6 hours PRN For Temp greater than 38.5 C (101.3 F)  acetaminophen   Tablet. 650milliGRAM(s) Oral every 6 hours PRN mild-moderate pain  dextrose Gel 1Dose(s) Oral once PRN Blood Glucose LESS THAN 70 milliGRAM(s)/deciliter  glucagon  Injectable 1milliGRAM(s) IntraMuscular once PRN Glucose LESS THAN 70 milligrams/deciliter      Vital Signs Last 24 Hrs  T(C): 36.9, Max: 36.9 (06-13 @ 04:12)  HR: 92 (85 - 99)  BP: 157/76 (124/79 - 157/76)  RR: 19 (16 - 19)  SpO2: 98% (93% - 98%)  Wt(kg): --  CAPILLARY BLOOD GLUCOSE  109 (13 Jun 2017 07:24)  151 (12 Jun 2017 21:41)  228 (12 Jun 2017 18:07)  124 (12 Jun 2017 12:49)    I&O's Summary  I & Os for 24h ending 13 Jun 2017 07:00  =============================================  IN: 760 ml / OUT: 3000 ml / NET: -2240 ml    I & Os for current day (as of 13 Jun 2017 11:23)  =============================================  IN: 240 ml / OUT: 0 ml / NET: 240 ml      PHYSICAL EXAM:  GENERAL: NAD, well-developed  HEAD:  Atraumatic, Normocephalic  EYES: EOMI, PERRLA, conjunctiva and sclera clear  NECK: Supple, + JVD  CHEST/LUNG: Clear to auscultation bilaterally; No wheeze  HEART: Regular rate and rhythm; No murmurs, rubs, or gallops  ABDOMEN: Soft, Nontender, Nondistended; Bowel sounds present  EXTREMITIES:  2+ Peripheral Pulses, 3+ edema  PSYCH: AAOx3  NEUROLOGY: non-focal  SKIN: No rashes or lesions    LABS:                        8.0    5.1   )-----------( 152      ( 12 Jun 2017 22:36 )             24.0     06-12    138  |  94<L>  |  89<H>  ----------------------------<  148<H>  4.8   |  26  |  6.50<H>    Ca    8.2<L>      12 Jun 2017 09:09  Phos  2.8     06-12  Mg     2.4     06-12      PT/INR - ( 12 Jun 2017 22:36 )   PT: 12.7 sec;   INR: 1.16 ratio         PTT - ( 12 Jun 2017 22:36 )  PTT:29.1 sec          RADIOLOGY & ADDITIONAL TESTS:    Imaging Personally Reviewed:    Consultant(s) Notes Reviewed:      Care Discussed with Consultants/Other Providers:

## 2017-06-13 NOTE — PROGRESS NOTE ADULT - SUBJECTIVE AND OBJECTIVE BOX
Patient is a 47y Male  being evaluated for                 who reports no complaints overnight.        PHYSICAL EXAM:  Vitals:  T(F): 98.5, Max: 98.5 (06-13 @ 04:12)  HR: 85  BP: 134/74  BP(mean): --  RR: 19  SpO2: 98%  Wt(kg): --  Constitutional: no acute distress  HEENT:  NC, ext ears nl, oropharynx clear,  nose nl  Neck: No JVD, bruit, adenopathy or thyromegaly  Eyes: PERRL, no discharge, no icterus  Respiratory: cta/p bilat, no wheezes, rales, nl effort  Cardiovascular: regular rate, S1 and S2 no rub or gallop  Abd: : BS+, soft, NT , no rebound or guarding, no bruits  Extremities: no edema or cyanosis, palpable pulses  Neurological: A/O x 3, nl coordination and tone    I and O's:    I & Os for current day (as of 06-13 @ 08:23)  =============================================  IN: 760 ml / OUT: 3000 ml / NET: -2240 ml          REVIEW OF SYSTEMS:  CONSTITUTIONAL: No weakness, fevers or chills  RESPIRATORY: No cough, wheezing, hemoptysis; No shortness of breath  CARDIOVASCULAR: No chest pain or palpitations  GI : no abd pains, nausea or vomiting  GENITOURINARY: No dysuria, frequency or hematuria  All other review of systems is negative unless indicated above.    Allergies    No Known Allergies    Intolerances        MEDICATIONS  (STANDING):  atorvastatin 40milliGRAM(s) Oral at bedtime  ferrous    sulfate 325milliGRAM(s) Oral two times a day with meals  dextrose 50% Injectable 12.5Gram(s) IV Push once  dextrose 50% Injectable 25Gram(s) IV Push once  dextrose 50% Injectable 25Gram(s) IV Push once  carvedilol 3.125milliGRAM(s) Oral every 12 hours  dextrose 50% Injectable 50milliLiter(s) IV Push once  aspirin enteric coated 81milliGRAM(s) Oral daily  hydrALAZINE 100milliGRAM(s) Oral three times a day  isosorbide   dinitrate Tablet (ISORDIL) 20milliGRAM(s) Oral three times a day  insulin lispro (HumaLOG) corrective regimen sliding scale  SubCutaneous three times a day before meals  insulin lispro (HumaLOG) corrective regimen sliding scale  SubCutaneous at bedtime  valsartan 40milliGRAM(s) Oral two times a day  torsemide 60milliGRAM(s) Oral daily  calcium acetate 667milliGRAM(s) Oral three times a day with meals  Nephrocaps 1Capsule(s) Oral daily  AQUAPHOR (petrolatum Ointment) 1Application(s) Topical two times a day  epoetin arian Injectable [id number](s) IV Push <User Schedule>  iron sucrose IVPB 100milliGRAM(s) IV Intermittent <User Schedule>      LABS:  CBC Full  -  ( 12 Jun 2017 22:36 )  WBC Count : 5.1 K/uL  Hemoglobin : 8.0 g/dL  Hematocrit : 24.0 %  Platelet Count - Automated : 152 K/uL  Mean Cell Volume : 94.4 fl  Mean Cell Hemoglobin : 31.5 pg  Mean Cell Hemoglobin Concentration : 33.4 gm/dL  Auto Neutrophil # : x  Auto Lymphocyte # : x  Auto Monocyte # : x  Auto Eosinophil # : x  Auto Basophil # : x  Auto Neutrophil % : x  Auto Lymphocyte % : x  Auto Monocyte % : x  Auto Eosinophil % : x  Auto Basophil % : x    06-12    138  |  94<L>  |  89<H>  ----------------------------<  148<H>  4.8   |  26  |  6.50<H>    Ca    8.2<L>      12 Jun 2017 09:09  Phos  2.8     06-12  Mg     2.4     06-12        Urine Studies:      Urine chemistry:   Urine Na:   Urine Creatinine:   Urine Protein/Cr ratio:  Urine K:   Urine Osm:   24 Hr urine studies:       Imp:  47y Male Patient is a 47y Male  being evaluated for    ESRD          who reports no complaints overnight.  felt very tired after HD yesterday  not short of breath at rest        PHYSICAL EXAM:  Vitals:  T(F): 98.5, Max: 98.5 (06-13 @ 04:12)  HR: 85  BP: 134/74  BP(mean): --  RR: 19  SpO2: 98%  Wt(kg): --  Constitutional: no acute distress  HEENT:  NC, ext ears nl, oropharynx clear,  nose nl  Neck: No JVD, bruit, adenopathy or thyromegaly  Eyes: PERRL, no discharge, no icterus  Respiratory: cta/p bilat, no wheezes, rales, nl effort  Cardiovascular: regular rate, S1 and S2 no rub or gallop  Abd: : BS+, soft, NT , no rebound or guarding, no bruits  Extremities: ++ bilateral edema / chronic changes , + thrill AVf  Neurological: A/O x 3, nl coordination and tone    I and O's:    I & Os for current day (as of 06-13 @ 08:23)  =============================================  IN: 760 ml / OUT: 3000 ml / NET: -2240 ml          REVIEW OF SYSTEMS:  CONSTITUTIONAL: No weakness, fevers or chills  RESPIRATORY: No cough, wheezing, hemoptysis; No shortness of breath  CARDIOVASCULAR: No chest pain or palpitations  GI : no abd pains, nausea or vomiting  GENITOURINARY: No dysuria, frequency or hematuria  All other review of systems is negative unless indicated above.    Allergies    No Known Allergies    Intolerances        MEDICATIONS  (STANDING):  atorvastatin 40milliGRAM(s) Oral at bedtime  ferrous    sulfate 325milliGRAM(s) Oral two times a day with meals  dextrose 50% Injectable 12.5Gram(s) IV Push once  dextrose 50% Injectable 25Gram(s) IV Push once  dextrose 50% Injectable 25Gram(s) IV Push once  carvedilol 3.125milliGRAM(s) Oral every 12 hours  dextrose 50% Injectable 50milliLiter(s) IV Push once  aspirin enteric coated 81milliGRAM(s) Oral daily  hydrALAZINE 100milliGRAM(s) Oral three times a day  isosorbide   dinitrate Tablet (ISORDIL) 20milliGRAM(s) Oral three times a day  insulin lispro (HumaLOG) corrective regimen sliding scale  SubCutaneous three times a day before meals  insulin lispro (HumaLOG) corrective regimen sliding scale  SubCutaneous at bedtime  valsartan 40milliGRAM(s) Oral two times a day  torsemide 60milliGRAM(s) Oral daily  calcium acetate 667milliGRAM(s) Oral three times a day with meals  Nephrocaps 1Capsule(s) Oral daily  AQUAPHOR (petrolatum Ointment) 1Application(s) Topical two times a day  epoetin arian Injectable 44859Svfz(s) IV Push <User Schedule>  iron sucrose IVPB 100milliGRAM(s) IV Intermittent <User Schedule>      LABS:  CBC Full  -  ( 12 Jun 2017 22:36 )  WBC Count : 5.1 K/uL  Hemoglobin : 8.0 g/dL  Hematocrit : 24.0 %  Platelet Count - Automated : 152 K/uL  Mean Cell Volume : 94.4 fl  Mean Cell Hemoglobin : 31.5 pg  Mean Cell Hemoglobin Concentration : 33.4 gm/dL  Auto Neutrophil # : x  Auto Lymphocyte # : x  Auto Monocyte # : x  Auto Eosinophil # : x  Auto Basophil # : x  Auto Neutrophil % : x  Auto Lymphocyte % : x  Auto Monocyte % : x  Auto Eosinophil % : x  Auto Basophil % : x    06-12    138  |  94<L>  |  89<H>  ----------------------------<  148<H>  4.8   |  26  |  6.50<H>    Ca    8.2<L>      12 Jun 2017 09:09  Phos  2.8     06-12  Mg     2.4     06-12        Urine Studies:      Urine chemistry:   Urine Na:   Urine Creatinine:   Urine Protein/Cr ratio:  Urine K:   Urine Osm:   24 Hr urine studies:       Imp:  47y Male

## 2017-06-13 NOTE — PROGRESS NOTE ADULT - PROBLEM SELECTOR PLAN 1
Problem: Acute on chronic combined systolic and diastolic heart failure.  Plan: dialysis, daily We will continue daily UF per nephrology to achieve euvolemia  He needs improved vasodilation. If BP is an issue in HD, we can decrease the dose of vasodilators.

## 2017-06-13 NOTE — PROGRESS NOTE ADULT - SUBJECTIVE AND OBJECTIVE BOX
HPI:  Pt is a 47yo male w/ PMH HFrEF (17% 4/26/17), CAD s/p CABG x4 2016, ICM s/p recent AICD 5/2017, DM2 c/b peripheral neuropathy w/ toe ulcers presents with 6 days of progressive swelling, orthopnea, dyspnea in setting of 20lb weight gain since being discharged from the hospital 3 weeks ago. Off diuretics since that admission as developed GIN during his hospital course. He also is no longer taking Januvia. Last A1c was 6.4 at prior admission. He denies chest pain, palpitations, diarrhea/dysuria. Does make urine 4x per day. No cough, runny nose. Cardiologist at last visit was Omi, follows w/ Jhonathan as outpatient.    In ED, VSS w/ bradycardia to high 40s. Na 122, K 6.3, HCO3 9 w/ AG 29. , Cr 9.05 from recent b/l 100/5.35 on 5/3.  INR 1.16, PTT 31.3. CBC remarkable only for mild anemia to 10.5 (@ baseline). Metabolic acidosis on VBG pH 7.25, pCO2 27, HCO3 11.  protein, small LE. CXR small L pleural effusion. Received Ca gluconate 2g, 10U regular insulin w/ 2 amps D50, lasix 60mg x1. (29 May 2017 16:51)      PAST MEDICAL & SURGICAL HISTORY:  Acute on chronic systolic heart failure  Coronary artery disease  Foot ulcer due to secondary DM  Diabetes mellitus: type 2  AICD (automatic cardioverter/defibrillator) present  S/P CABG (coronary artery bypass graft)  Toe amputation status, left  Breast Reduction: at age 18      MEDICATIONS  (STANDING):  atorvastatin 40milliGRAM(s) Oral at bedtime  ferrous    sulfate 325milliGRAM(s) Oral two times a day with meals  dextrose 50% Injectable 12.5Gram(s) IV Push once  dextrose 50% Injectable 25Gram(s) IV Push once  dextrose 50% Injectable 25Gram(s) IV Push once  carvedilol 3.125milliGRAM(s) Oral every 12 hours  dextrose 50% Injectable 50milliLiter(s) IV Push once  aspirin enteric coated 81milliGRAM(s) Oral daily  hydrALAZINE 100milliGRAM(s) Oral three times a day  isosorbide   dinitrate Tablet (ISORDIL) 20milliGRAM(s) Oral three times a day  insulin lispro (HumaLOG) corrective regimen sliding scale  SubCutaneous three times a day before meals  insulin lispro (HumaLOG) corrective regimen sliding scale  SubCutaneous at bedtime  valsartan 40milliGRAM(s) Oral two times a day  torsemide 60milliGRAM(s) Oral daily  calcium acetate 667milliGRAM(s) Oral three times a day with meals  Nephrocaps 1Capsule(s) Oral daily  AQUAPHOR (petrolatum Ointment) 1Application(s) Topical two times a day  epoetin arian Injectable [id number](s) IV Push <User Schedule>  iron sucrose IVPB 100milliGRAM(s) IV Intermittent <User Schedule>    MEDICATIONS  (PRN):  acetaminophen   Tablet 650milliGRAM(s) Oral every 6 hours PRN For Temp greater than 38.5 C (101.3 F)  acetaminophen   Tablet. 650milliGRAM(s) Oral every 6 hours PRN mild-moderate pain  dextrose Gel 1Dose(s) Oral once PRN Blood Glucose LESS THAN 70 milliGRAM(s)/deciliter  glucagon  Injectable 1milliGRAM(s) IntraMuscular once PRN Glucose LESS THAN 70 milligrams/deciliter      REVIEW OF SYSTEMS:  Vital Signs Last 24 Hrs  T(C): 36.9, Max: 36.9 (06-13 @ 04:12)  T(F): 98.5, Max: 98.5 (06-13 @ 04:12)  HR: 92 (85 - 99)  BP: 157/76 (134/74 - 157/76)  BP(mean): --  RR: 19 (18 - 19)  SpO2: 98% (96% - 98%)    PHYSICAL EXAM:    Constitutional: NAD, well-groomed, well-developed  Neuro: Alert and oriented x 3 Gait steady Speech clear No focal deficits  Neck: No JVD No bruit  Respiratory: CTAB  Cardiovascular: S1 and S2, RRR, no M/G/R  Gastrointestinal: BS+, soft, NT/ND  Extremities: No clubbing cyanosis or edema No varicosities  Vascular: 2+ peripheral pulses  Psychiatric: Normal mood, normal affect  Musculoskeletal: 5/5 strength b/l upper and lower extremities    LABS:  cret                        8.0    5.1   )-----------( 152      ( 12 Jun 2017 22:36 )             24.0     06-12    138  |  94<L>  |  89<H>  ----------------------------<  148<H>  4.8   |  26  |  6.50<H>    Ca    8.2<L>      12 Jun 2017 09:09  Phos  2.8     06-12  Mg     2.4     06-12      PT/INR - ( 12 Jun 2017 22:36 )   PT: 12.7 sec;   INR: 1.16 ratio         PTT - ( 12 Jun 2017 22:36 )  PTT:29.1 sec    I & Os for 24h ending 06-13 @ 07:00  =============================================  IN: 760 ml / OUT: 3000 ml / NET: -2240 ml    Daily     Daily 47 year old man with ischemic cardiomyopathy in the context of poorly controlled hypertension and poorly controlled diabetes mellitus. He developed progressive acute on chronic stage V CKD leading to severe volume overload. He is hemodynamically stable, but continues to be         PAST MEDICAL & SURGICAL HISTORY:  Acute on chronic systolic heart failure  Coronary artery disease  Foot ulcer due to secondary DM  Diabetes mellitus: type 2  AICD (automatic cardioverter/defibrillator) present  S/P CABG (coronary artery bypass graft)  Toe amputation status, left  Breast Reduction: at age 18      MEDICATIONS  (STANDING):  atorvastatin 40milliGRAM(s) Oral at bedtime  ferrous    sulfate 325milliGRAM(s) Oral two times a day with meals  dextrose 50% Injectable 12.5Gram(s) IV Push once  dextrose 50% Injectable 25Gram(s) IV Push once  dextrose 50% Injectable 25Gram(s) IV Push once  carvedilol 3.125milliGRAM(s) Oral every 12 hours  dextrose 50% Injectable 50milliLiter(s) IV Push once  aspirin enteric coated 81milliGRAM(s) Oral daily  hydrALAZINE 100milliGRAM(s) Oral three times a day  isosorbide   dinitrate Tablet (ISORDIL) 20milliGRAM(s) Oral three times a day  insulin lispro (HumaLOG) corrective regimen sliding scale  SubCutaneous three times a day before meals  insulin lispro (HumaLOG) corrective regimen sliding scale  SubCutaneous at bedtime  valsartan 40milliGRAM(s) Oral two times a day  torsemide 60milliGRAM(s) Oral daily  calcium acetate 667milliGRAM(s) Oral three times a day with meals  Nephrocaps 1Capsule(s) Oral daily  AQUAPHOR (petrolatum Ointment) 1Application(s) Topical two times a day  epoetin arian Injectable [id number](s) IV Push <User Schedule>  iron sucrose IVPB 100milliGRAM(s) IV Intermittent <User Schedule>    MEDICATIONS  (PRN):  acetaminophen   Tablet 650milliGRAM(s) Oral every 6 hours PRN For Temp greater than 38.5 C (101.3 F)  acetaminophen   Tablet. 650milliGRAM(s) Oral every 6 hours PRN mild-moderate pain  dextrose Gel 1Dose(s) Oral once PRN Blood Glucose LESS THAN 70 milliGRAM(s)/deciliter  glucagon  Injectable 1milliGRAM(s) IntraMuscular once PRN Glucose LESS THAN 70 milligrams/deciliter      REVIEW OF SYSTEMS:  Vital Signs Last 24 Hrs  T(C): 36.9, Max: 36.9 (06-13 @ 04:12)  T(F): 98.5, Max: 98.5 (06-13 @ 04:12)  HR: 92 (85 - 99)  BP: 157/76 (134/74 - 157/76)  BP(mean): --  RR: 19 (18 - 19)  SpO2: 98% (96% - 98%)    PHYSICAL EXAM:    Constitutional: NAD, well-groomed, well-developed  Neuro: Alert and oriented x 3 Gait steady Speech clear No focal deficits  Neck Moderate JVD No bruit  Respiratory: CTAB  Cardiovascular: S1 and S2, RRR, no M/G/R  Gastrointestinal: BS+, soft, NT/ND   Extremities: 3 + bilateral edema of extremities   Vascular: 2+ peripheral pulses  Psychiatric: Normal mood, normal affect  Musculoskeletal: 5/5 strength b/l upper and lower extremities    LABS:  cret                        8.0    5.1   )-----------( 152      ( 12 Jun 2017 22:36 )             24.0     06-12    138  |  94<L>  |  89<H>  ----------------------------<  148<H>  4.8   |  26  |  6.50<H>    Ca    8.2<L>      12 Jun 2017 09:09  Phos  2.8     06-12  Mg     2.4     06-12      PT/INR - ( 12 Jun 2017 22:36 )   PT: 12.7 sec;   INR: 1.16 ratio         PTT - ( 12 Jun 2017 22:36 )  PTT:29.1 sec    I & Os for 24h ending 06-13 @ 07:00  =============================================  IN: 760 ml / OUT: 3000 ml / NET: -2240 ml      Daily Underwent UF today. Feels better today with less dyspnea.         PAST MEDICAL & SURGICAL HISTORY:  Acute on chronic systolic heart failure  Coronary artery disease  Foot ulcer due to secondary DM  Diabetes mellitus: type 2  AICD (automatic cardioverter/defibrillator) present  S/P CABG (coronary artery bypass graft)  Toe amputation status, left  Breast Reduction: at age 18      MEDICATIONS  (STANDING):  atorvastatin 40milliGRAM(s) Oral at bedtime  ferrous    sulfate 325milliGRAM(s) Oral two times a day with meals  dextrose 50% Injectable 12.5Gram(s) IV Push once  dextrose 50% Injectable 25Gram(s) IV Push once  dextrose 50% Injectable 25Gram(s) IV Push once  carvedilol 3.125milliGRAM(s) Oral every 12 hours  dextrose 50% Injectable 50milliLiter(s) IV Push once  aspirin enteric coated 81milliGRAM(s) Oral daily  hydrALAZINE 100milliGRAM(s) Oral three times a day  isosorbide   dinitrate Tablet (ISORDIL) 20milliGRAM(s) Oral three times a day  insulin lispro (HumaLOG) corrective regimen sliding scale  SubCutaneous three times a day before meals  insulin lispro (HumaLOG) corrective regimen sliding scale  SubCutaneous at bedtime  valsartan 40milliGRAM(s) Oral two times a day  torsemide 60milliGRAM(s) Oral daily  calcium acetate 667milliGRAM(s) Oral three times a day with meals  Nephrocaps 1Capsule(s) Oral daily  AQUAPHOR (petrolatum Ointment) 1Application(s) Topical two times a day  epoetin arian Injectable [id number](s) IV Push <User Schedule>  iron sucrose IVPB 100milliGRAM(s) IV Intermittent <User Schedule>    MEDICATIONS  (PRN):  acetaminophen   Tablet 650milliGRAM(s) Oral every 6 hours PRN For Temp greater than 38.5 C (101.3 F)  acetaminophen   Tablet. 650milliGRAM(s) Oral every 6 hours PRN mild-moderate pain  dextrose Gel 1Dose(s) Oral once PRN Blood Glucose LESS THAN 70 milliGRAM(s)/deciliter  glucagon  Injectable 1milliGRAM(s) IntraMuscular once PRN Glucose LESS THAN 70 milligrams/deciliter      REVIEW OF SYSTEMS:  Vital Signs Last 24 Hrs  T(C): 36.9, Max: 36.9 (06-13 @ 04:12)  T(F): 98.5, Max: 98.5 (06-13 @ 04:12)  HR: 92 (85 - 99)  BP: 157/76 (134/74 - 157/76)  BP(mean): --  RR: 19 (18 - 19)  SpO2: 98% (96% - 98%)    PHYSICAL EXAM:    Constitutional: NAD, well-groomed, well-developed  Neuro: Alert and oriented x 3. Speech clear No focal deficits  Neck Moderate JVD  Respiratory: CTAB  Cardiovascular: S1 and S2, RRR, no M/G/R  Gastrointestinal: BS+, soft, NT/ND   Extremities: 3 + bilateral edema of extremities   Vascular: 2+ peripheral pulses  Psychiatric: Normal mood, normal affect    LABS:  creat                        8.0    5.1   )-----------( 152      ( 12 Jun 2017 22:36 )             24.0     06-12    138  |  94<L>  |  89<H>  ----------------------------<  148<H>  4.8   |  26  |  6.50<H>    Ca    8.2<L>      12 Jun 2017 09:09  Phos  2.8     06-12  Mg     2.4     06-12      PT/INR - ( 12 Jun 2017 22:36 )   PT: 12.7 sec;   INR: 1.16 ratio         PTT - ( 12 Jun 2017 22:36 )  PTT:29.1 sec    I & Os for 24h ending 06-13 @ 07:00  =============================================  IN: 760 ml / OUT: 3000 ml / NET: -2240 ml      Daily

## 2017-06-13 NOTE — CHART NOTE - NSCHARTNOTEFT_GEN_A_CORE
Source: Patient [x ]    Family [ ]     other [x ]chart    Diet : CSTCHOSN/RENAL, 2 x Nepro daily  receives HD      Patient reports [ ] nausea  [ ] vomiting [ ] diarrhea [ ] constipation  [ ]chewing problems [ ] swallowing issues  [ ] other:      PO intake:  < 50% [ ] 50-75% [ X]   % [ ]  other :including 2 Nepro daily     Source for PO intake [X ] Patient [ ] family [ ] chart [ ] staff [ ] other     Enteral /Parenteral Nutrition:       Current Weight: 253.9pounds/117.2kg  % Weight Change 2% loss since 5/29    Pertinent Medications: atorvastatin 40  ferrous    sulfate 325  acetaminophen   Tablet 650 PRN  acetaminophen   Tablet. 650 PRN  dextrose Gel 1 PRN  dextrose 50% Injectable 12.5  dextrose 50% Injectable 25  dextrose 50% Injectable 25  glucagon  Injectable 1 PRN  carvedilol 3.125  dextrose 50% Injectable 50  aspirin enteric coated 81  hydrALAZINE 100  isosorbide   dinitrate Tablet (ISORDIL) 20  insulin lispro (HumaLOG) corrective regimen sliding scale   insulin lispro (HumaLOG) corrective regimen sliding scale   valsartan 40  torsemide 60  calcium acetate 667  Nephrocaps 1  AQUAPHOR (petrolatum Ointment) 1  epoetin arian Injectable 69005  iron sucrose IVPB 100        Pertinent Labs:  BUN 89,creatinine 6.50, Glucose 148  CAPILLARY BLOOD GLUCOSE  109 (13 Jun 2017 07:24)  151 (12 Jun 2017 21:41)  228 (12 Jun 2017 18:07)  124 (12 Jun 2017 12:49)    Hemoglobin A1C, Whole Blood: 6.4 % (04-27 @ 07:23)          Skin: +3 left/right arm,+4 left/right leg, bruised ecchymotic    Estimated Needs:   [x ] no change since previous assessment  [ ] recalculated:       Previous Nutrition Diagnosis:     [ ] Inadequate Energy Intake [ ]Inadequate Oral Intake [ ] Excessive Energy Intake     [ ] Underweight [ ] Increased Nutrient Needs [ ] Overweight/Obesity     [ ] Altered GI Function [ ] Unintended Weight Loss [ x] Food & Nutrition Related Knowledge Deficit [ ] Malnutrition          Nutrition Diagnosis is [ x] ongoing  [ ] resolved [ ] not applicable          New Nutrition Diagnosis: [ ] not applicable    [ ] Inadequate Protein Energy Intake [ ]Inadequate Oral Intake [ ] Excessive Energy Intake     [ ] Underweight [ ] Increased Nutrient Needs [ ] Overweight/Obesity     [ ] Altered GI Function [ ] Unintended Weight Loss [ ] Food & Nutrition Related Knowledge Deficit[ ] Limited Adherence to nutrition related recommendations [ ] Malnutrition  [ ] other: Free text       Related to:      As evidenced by:      Interventions:     Recommend    [ ] Change Diet To:    [ ] Nutrition Supplement    [ ] Nutrition Support    [ ] Other:        Monitoring and Evaluation:     [x ] PO intake [x ] Tolerance to diet prescription [x ] weights [ x] follow up per protocol    [ ] other:

## 2017-06-13 NOTE — CHART NOTE - NSCHARTNOTEFT_GEN_A_CORE
Pt scheduled for Ultrafiltration (Dialysis) this afternoon.  Pt received Coreg 3.125 mg at 1100 this morning.  Hydralazine, Valsartan and Isordil has been held.  BP: at 1400:  136/66 mmhg.  Pt will receive medications after Ultrafiltration.  Discussed with Dr. Castro.

## 2017-06-13 NOTE — PROGRESS NOTE ADULT - ASSESSMENT
ESRD/ HD  systolic/ diastolic CHF/ volume overload  discussed with CHF service today  will attempt additional UF as BP tolerates today  may need to decrease anti- hypertensive medications if BP low  usual HD tomorrow/ procrit with HD/ monitor H/H  discharge plans per medicine

## 2017-06-13 NOTE — PROGRESS NOTE ADULT - ASSESSMENT
45yo male w/ PMH HFrEF (17% 4/26/17), CAD s/p CABG x4 2016, ICM s/p recent AICD 5/2017, DM2 c/b peripheral neuropathy w/ toe ulcers presents with 6 days of progressive swelling, orthopnea, dyspnea w GIN on CKD5 requiring initiation of HD and control of HTN  Discharge planning in progress with possible follow in [facility] [place] .

## 2017-06-13 NOTE — PROGRESS NOTE ADULT - ASSESSMENT
47 year old man with ischemic cardiomyopathy in the context of poorly controlled hypertension and poorly controlled diabetes mellitus. He developed progressive acute on chronic stage V CKD leading to severe volume overload. He is hemodynamically stable, but continues to be 47 year old man with ischemic cardiomyopathy in the context of poorly controlled hypertension and poorly controlled diabetes mellitus. He developed progressive acute on chronic stage V CKD leading to severe volume overload. He is hemodynamically stable, but continues to be volume overloaded.

## 2017-06-14 LAB
ANION GAP SERPL CALC-SCNC: 13 MMOL/L — SIGNIFICANT CHANGE UP (ref 5–17)
BUN SERPL-MCNC: 78 MG/DL — HIGH (ref 7–23)
CALCIUM SERPL-MCNC: 8.6 MG/DL — SIGNIFICANT CHANGE UP (ref 8.4–10.5)
CHLORIDE SERPL-SCNC: 97 MMOL/L — SIGNIFICANT CHANGE UP (ref 96–108)
CO2 SERPL-SCNC: 26 MMOL/L — SIGNIFICANT CHANGE UP (ref 22–31)
CREAT SERPL-MCNC: 6.22 MG/DL — HIGH (ref 0.5–1.3)
GLUCOSE SERPL-MCNC: 125 MG/DL — HIGH (ref 70–99)
HCT VFR BLD CALC: 25.8 % — LOW (ref 39–50)
HGB BLD-MCNC: 8.5 G/DL — LOW (ref 13–17)
MCHC RBC-ENTMCNC: 31.4 PG — SIGNIFICANT CHANGE UP (ref 27–34)
MCHC RBC-ENTMCNC: 33.1 GM/DL — SIGNIFICANT CHANGE UP (ref 32–36)
MCV RBC AUTO: 94.8 FL — SIGNIFICANT CHANGE UP (ref 80–100)
PLATELET # BLD AUTO: 156 K/UL — SIGNIFICANT CHANGE UP (ref 150–400)
POTASSIUM SERPL-MCNC: 4.8 MMOL/L — SIGNIFICANT CHANGE UP (ref 3.5–5.3)
POTASSIUM SERPL-SCNC: 4.8 MMOL/L — SIGNIFICANT CHANGE UP (ref 3.5–5.3)
RBC # BLD: 2.72 M/UL — LOW (ref 4.2–5.8)
RBC # FLD: 16.4 % — HIGH (ref 10.3–14.5)
SODIUM SERPL-SCNC: 136 MMOL/L — SIGNIFICANT CHANGE UP (ref 135–145)
WBC # BLD: 4.8 K/UL — SIGNIFICANT CHANGE UP (ref 3.8–10.5)
WBC # FLD AUTO: 4.8 K/UL — SIGNIFICANT CHANGE UP (ref 3.8–10.5)

## 2017-06-14 RX ADMIN — VALSARTAN 40 MILLIGRAM(S): 80 TABLET ORAL at 17:07

## 2017-06-14 RX ADMIN — Medication 100 MILLIGRAM(S): at 05:55

## 2017-06-14 RX ADMIN — Medication 325 MILLIGRAM(S): at 09:32

## 2017-06-14 RX ADMIN — Medication 1 CAPSULE(S): at 11:45

## 2017-06-14 RX ADMIN — CARVEDILOL PHOSPHATE 3.12 MILLIGRAM(S): 80 CAPSULE, EXTENDED RELEASE ORAL at 09:32

## 2017-06-14 RX ADMIN — ERYTHROPOIETIN 10000 UNIT(S): 10000 INJECTION, SOLUTION INTRAVENOUS; SUBCUTANEOUS at 13:13

## 2017-06-14 RX ADMIN — Medication 100 MILLIGRAM(S): at 21:50

## 2017-06-14 RX ADMIN — Medication 325 MILLIGRAM(S): at 17:58

## 2017-06-14 RX ADMIN — CARVEDILOL PHOSPHATE 3.12 MILLIGRAM(S): 80 CAPSULE, EXTENDED RELEASE ORAL at 17:07

## 2017-06-14 RX ADMIN — VALSARTAN 40 MILLIGRAM(S): 80 TABLET ORAL at 05:55

## 2017-06-14 RX ADMIN — ATORVASTATIN CALCIUM 40 MILLIGRAM(S): 80 TABLET, FILM COATED ORAL at 21:50

## 2017-06-14 RX ADMIN — ISOSORBIDE DINITRATE 20 MILLIGRAM(S): 5 TABLET ORAL at 05:55

## 2017-06-14 RX ADMIN — ISOSORBIDE DINITRATE 20 MILLIGRAM(S): 5 TABLET ORAL at 21:50

## 2017-06-14 RX ADMIN — Medication 667 MILLIGRAM(S): at 17:58

## 2017-06-14 RX ADMIN — Medication 1: at 11:45

## 2017-06-14 RX ADMIN — Medication 60 MILLIGRAM(S): at 21:50

## 2017-06-14 RX ADMIN — Medication 1 APPLICATION(S): at 19:25

## 2017-06-14 RX ADMIN — IRON SUCROSE 210 MILLIGRAM(S): 20 INJECTION, SOLUTION INTRAVENOUS at 13:14

## 2017-06-14 RX ADMIN — Medication 667 MILLIGRAM(S): at 09:32

## 2017-06-14 RX ADMIN — Medication 667 MILLIGRAM(S): at 11:45

## 2017-06-14 RX ADMIN — Medication 1 APPLICATION(S): at 05:55

## 2017-06-14 RX ADMIN — Medication 81 MILLIGRAM(S): at 11:45

## 2017-06-14 NOTE — PROGRESS NOTE ADULT - ASSESSMENT
Imp-  47y Male with ESRD  CMP  fluid overload  anemia of CKD - cont procrit  daily hd with fluid removal as tolerated  dw pt  hyperphosphatemia improved  HD today with UF maximized as bp allows

## 2017-06-14 NOTE — PROGRESS NOTE ADULT - ASSESSMENT
45yo male w/ PMH HFrEF (17% 4/26/17), CAD s/p CABG x4 2016, ICM s/p recent AICD 5/2017, DM2 c/b peripheral neuropathy w/ toe ulcers presents with 6 days of progressive swelling, orthopnea, dyspnea w GIN on CKD5 requiring initiation of HD and control of HTN  Discharge planning in progress with possible follow in [facility] .

## 2017-06-14 NOTE — PROGRESS NOTE ADULT - PROBLEM SELECTOR PLAN 1
We will continue daily UF per nephrology to achieve euvolemia  He needs improved vasodilation. If BP is an issue in HD, we can decrease the dose of vasodilators.

## 2017-06-14 NOTE — PROGRESS NOTE ADULT - ASSESSMENT
47 year old man with ischemic cardiomyopathy in the context of poorly controlled hypertension and poorly controlled diabetes mellitus. He developed progressive acute on chronic stage V CKD leading to severe volume overload. He is hemodynamically stable, but continues to be volume overloaded.

## 2017-06-14 NOTE — PROGRESS NOTE ADULT - SUBJECTIVE AND OBJECTIVE BOX
Subjective:    PAST MEDICAL & SURGICAL HISTORY:  Acute on chronic systolic heart failure  Coronary artery disease  Foot ulcer due to secondary DM  Diabetes mellitus: type 2  AICD (automatic cardioverter/defibrillator) present  S/P CABG (coronary artery bypass graft)  Toe amputation status, left  Breast Reduction: at age 18      MEDICATIONS  (STANDING):  atorvastatin 40milliGRAM(s) Oral at bedtime  ferrous    sulfate 325milliGRAM(s) Oral two times a day with meals  dextrose 50% Injectable 12.5Gram(s) IV Push once  dextrose 50% Injectable 25Gram(s) IV Push once  dextrose 50% Injectable 25Gram(s) IV Push once  carvedilol 3.125milliGRAM(s) Oral every 12 hours  dextrose 50% Injectable 50milliLiter(s) IV Push once  aspirin enteric coated 81milliGRAM(s) Oral daily  hydrALAZINE 100milliGRAM(s) Oral three times a day  isosorbide   dinitrate Tablet (ISORDIL) 20milliGRAM(s) Oral three times a day  insulin lispro (HumaLOG) corrective regimen sliding scale  SubCutaneous three times a day before meals  insulin lispro (HumaLOG) corrective regimen sliding scale  SubCutaneous at bedtime  valsartan 40milliGRAM(s) Oral two times a day  torsemide 60milliGRAM(s) Oral daily  calcium acetate 667milliGRAM(s) Oral three times a day with meals  Nephrocaps 1Capsule(s) Oral daily  AQUAPHOR (petrolatum Ointment) 1Application(s) Topical two times a day  epoetin arian Injectable 82828Owew(s) IV Push <User Schedule>  iron sucrose IVPB 100milliGRAM(s) IV Intermittent <User Schedule>    MEDICATIONS  (PRN):  acetaminophen   Tablet 650milliGRAM(s) Oral every 6 hours PRN For Temp greater than 38.5 C (101.3 F)  acetaminophen   Tablet. 650milliGRAM(s) Oral every 6 hours PRN mild-moderate pain  dextrose Gel 1Dose(s) Oral once PRN Blood Glucose LESS THAN 70 milliGRAM(s)/deciliter  glucagon  Injectable 1milliGRAM(s) IntraMuscular once PRN Glucose LESS THAN 70 milligrams/deciliter      REVIEW OF SYSTEMS:  Vital Signs Last 24 Hrs  T(C): 36.8, Max: 37.1 (06-13 @ 21:39)  T(F): 98.2, Max: 98.7 ( @ 21:39)  HR: 83 (78 - 94)  BP: 128/69 (128/69 - 157/76)  BP(mean): --  RR: 19 (18 - 20)  SpO2: 98% (96% - 99%)    PHYSICAL EXAM:  General: A/ox 3, No acute Distress  Neck: Supple, JVD  Cardiac: S1 S2, No M/R/G  Pulmonary: CTAB, Breathing unlabored, No Rhonchi/Rales/Wheezing  Abdomen: Soft, Non -tender, +BS   Extremities: No Rashes, +  edema  Neuro: A/o x 3, No focal deficits  Psch: normal mood , normal affect  LABS:  cret                        8.5    4.8   )-----------( 156      ( 2017 06:39 )             25.8     06-14    136  |  97  |  78<H>  ----------------------------<  125<H>  4.8   |  26  |  6.22<H>    Ca    8.6      2017 06:39  Phos  2.8     06-12  Mg     2.4     06-12      PT/INR - ( 2017 22:36 )   PT: 12.7 sec;   INR: 1.16 ratio         PTT - ( 2017 22:36 )  PTT:29.1 sec  Daily     Daily Weight in k.6 (2017 21:39)  I&O's Detail    I & Os for current day (as of 2017 07:53)  =============================================  IN:    Oral Fluid: 830 ml    Total IN: 830 ml  ---------------------------------------------  OUT:    Total OUT: 0 ml  ---------------------------------------------  Total NET: 830 ml

## 2017-06-14 NOTE — PROGRESS NOTE ADULT - SUBJECTIVE AND OBJECTIVE BOX
Patient is a 47y Male  being evaluated for   ESRD    still with edema        PHYSICAL EXAM:  Vital Signs Last 24 Hrs  T(C): 36.8, Max: 37.1 (06-13 @ 21:39)  T(F): 98.2, Max: 98.7 (06-13 @ 21:39)  HR: 83 (78 - 94)  BP: 128/69 (128/69 - 157/76)  BP(mean): --  RR: 19 (18 - 20)  SpO2: 98% (96% - 99%)  General: no acute distress  HEENT:  NC, ext ears nl, oropharynx clear,  nose nl  Neck: No JVD, bruit, adenopathy or thyromegaly  Eyes: PERRL, no discharge, no icterus  Respiratory: dec bs bases, no wheezes, rales, nl effort  Cardiovascular: regular rate, S1 and S2 no rub or gallop  Abd: : BS+, soft, NT , no rebound or guarding, no bruits  Extremities: 2+ edema, no cyanosis, palpable pulses  AV fistula with good bruit and thrill  permcath site clean      I and O's:  I & Os for current day (as of 06-12 @ 09:05)  =============================================  IN: 790 ml / OUT: 0 ml / NET: 790 ml            REVIEW OF SYSTEMS:  CONSTITUTIONAL: No  fevers or chills  RESPIRATORY: No cough, wheezing, hemoptysis  CARDIOVASCULAR: No chest pain or palpitations  GI : no abd pain, nausea or vomiting  GENITOURINARY: No dysuria, frequency or hematuria  All other review of systems is negative unless indicated above.    Allergies    No Known Allergies    Intolerances          MEDICATIONS  (STANDING):  atorvastatin 40milliGRAM(s) Oral at bedtime  ferrous    sulfate 325milliGRAM(s) Oral two times a day with meals  heparin  Injectable 5000Unit(s) SubCutaneous every 8 hours  dextrose 50% Injectable 12.5Gram(s) IV Push once  dextrose 50% Injectable 25Gram(s) IV Push once  dextrose 50% Injectable 25Gram(s) IV Push once  carvedilol 3.125milliGRAM(s) Oral every 12 hours  dextrose 50% Injectable 50milliLiter(s) IV Push once  aspirin enteric coated 81milliGRAM(s) Oral daily  hydrALAZINE 100milliGRAM(s) Oral three times a day  isosorbide   dinitrate Tablet (ISORDIL) 20milliGRAM(s) Oral three times a day  insulin lispro (HumaLOG) corrective regimen sliding scale  SubCutaneous three times a day before meals  insulin lispro (HumaLOG) corrective regimen sliding scale  SubCutaneous at bedtime  valsartan 40milliGRAM(s) Oral two times a day  torsemide 60milliGRAM(s) Oral daily  calcium acetate 667milliGRAM(s) Oral three times a day with meals  Nephrocaps 1Capsule(s) Oral daily  AQUAPHOR (petrolatum Ointment) 1Application(s) Topical two times a day  epoetin arian Injectable 33442Stgp(s) IV Push <User Schedule>      Sodium,Serum 135    06-11 @ 06:56  Sodium,Serum 135    06-10 @ 08:29  Sodium,Serum 134    06-09 @ 06:58    Potassium,Serum 4.7    06-11 @ 06:56  Potassium,Serum 4.2    06-10 @ 08:29  Potassium,Serum 4.5    06-09 @ 06:58    Chloride,Serum 95    06-11 @ 06:56  Chloride,Serum 95    06-10 @ 08:29  Chloride,Serum 96    06-09 @ 06:58    CO2, Serum 24    06-11 @ 06:56  CO2, Serum 27    06-10 @ 08:29  CO2, Serum 25    06-09 @ 06:58    BUN 71    06-11 @ 06:56  BUN 57    06-10 @ 08:29  BUN 79    06-09 @ 06:58    Creatinine, Serum 5.61    06-11 @ 06:56  Creatinine, Serum 4.84    06-10 @ 08:29  Creatinine, Serum 5.89    06-09 @ 06:58      06-11    135  |  95<L>  |  71<H>  ----------------------------<  133<H>  4.7   |  24  |  5.61<H>    Ca    8.2<L>      11 Jun 2017 06:56                              8.2    5.7   )-----------( 167      ( 11 Jun 2017 06:56 )             25.3

## 2017-06-14 NOTE — PROVIDER CONTACT NOTE (MEDICATION) - ASSESSMENT
Patient off unit for dialysis, returned at approximately 1745. Patient was due for 100mg hydralazine and 20mg isordil at 1400. Due for valsartan and carvedilol at 1800.

## 2017-06-14 NOTE — PROGRESS NOTE ADULT - SUBJECTIVE AND OBJECTIVE BOX
Patient is a 47y old  Male who presents with a chief complaint of acute on chronic heart failure, acute renal failure (31 May 2017 09:48)      SUBJECTIVE / OVERNIGHT EVENTS: Feels better, asking for shower.  Review of Systems  chest pain no  palpitations no  sob no  nausea no  headache no    MEDICATIONS  (STANDING):  atorvastatin 40milliGRAM(s) Oral at bedtime  ferrous    sulfate 325milliGRAM(s) Oral two times a day with meals  dextrose 50% Injectable 12.5Gram(s) IV Push once  dextrose 50% Injectable 25Gram(s) IV Push once  dextrose 50% Injectable 25Gram(s) IV Push once  carvedilol 3.125milliGRAM(s) Oral every 12 hours  dextrose 50% Injectable 50milliLiter(s) IV Push once  aspirin enteric coated 81milliGRAM(s) Oral daily  hydrALAZINE 100milliGRAM(s) Oral three times a day  isosorbide   dinitrate Tablet (ISORDIL) 20milliGRAM(s) Oral three times a day  insulin lispro (HumaLOG) corrective regimen sliding scale  SubCutaneous three times a day before meals  insulin lispro (HumaLOG) corrective regimen sliding scale  SubCutaneous at bedtime  valsartan 40milliGRAM(s) Oral two times a day  torsemide 60milliGRAM(s) Oral daily  calcium acetate 667milliGRAM(s) Oral three times a day with meals  Nephrocaps 1Capsule(s) Oral daily  AQUAPHOR (petrolatum Ointment) 1Application(s) Topical two times a day  epoetin arian Injectable 00696Hxyr(s) IV Push <User Schedule>  iron sucrose IVPB 100milliGRAM(s) IV Intermittent <User Schedule>    MEDICATIONS  (PRN):  acetaminophen   Tablet 650milliGRAM(s) Oral every 6 hours PRN For Temp greater than 38.5 C (101.3 F)  acetaminophen   Tablet. 650milliGRAM(s) Oral every 6 hours PRN mild-moderate pain  dextrose Gel 1Dose(s) Oral once PRN Blood Glucose LESS THAN 70 milliGRAM(s)/deciliter  glucagon  Injectable 1milliGRAM(s) IntraMuscular once PRN Glucose LESS THAN 70 milligrams/deciliter      Vital Signs Last 24 Hrs  T(C): 36.6, Max: 37.1 (06-13 @ 21:39)  HR: 93 (78 - 93)  BP: 150/81 (115/64 - 154/82)  RR: 18 (18 - 20)  SpO2: 98% (96% - 99%)  Wt(kg): --  CAPILLARY BLOOD GLUCOSE  198 (14 Jun 2017 11:25)  114 (14 Jun 2017 08:57)  158 (13 Jun 2017 21:52)  241 (13 Jun 2017 18:06)  241 (13 Jun 2017 16:55)    I&O's Summary  I & Os for 24h ending 14 Jun 2017 07:00  =============================================  IN: 830 ml / OUT: 0 ml / NET: 830 ml    I & Os for current day (as of 14 Jun 2017 15:27)  =============================================  IN: 200 ml / OUT: 0 ml / NET: 200 ml      PHYSICAL EXAM:  GENERAL: NAD, well-developed  HEAD:  Atraumatic, Normocephalic  EYES: EOMI, PERRLA, conjunctiva and sclera clear  NECK: Supple, No JVD  CHEST/LUNG: Clear to auscultation bilaterally; No wheeze  HEART: Regular rate and rhythm; No murmurs, rubs, or gallops  ABDOMEN: Soft, Nontender, Nondistended; Bowel sounds present  EXTREMITIES:  2+ Peripheral Pulses, No clubbing, cyanosis, or edema  PSYCH: AAOx3  NEUROLOGY: non-focal  SKIN: No rashes or lesions    LABS:                        8.5    4.8   )-----------( 156      ( 14 Jun 2017 06:39 )             25.8     06-14    136  |  97  |  78<H>  ----------------------------<  125<H>  4.8   |  26  |  6.22<H>    Ca    8.6      14 Jun 2017 06:39      PT/INR - ( 12 Jun 2017 22:36 )   PT: 12.7 sec;   INR: 1.16 ratio         PTT - ( 12 Jun 2017 22:36 )  PTT:29.1 sec          RADIOLOGY & ADDITIONAL TESTS:    Imaging Personally Reviewed:    Consultant(s) Notes Reviewed:      Care Discussed with Consultants/Other Providers:

## 2017-06-15 LAB
ANION GAP SERPL CALC-SCNC: 14 MMOL/L — SIGNIFICANT CHANGE UP (ref 5–17)
BUN SERPL-MCNC: 54 MG/DL — HIGH (ref 7–23)
CALCIUM SERPL-MCNC: 8.5 MG/DL — SIGNIFICANT CHANGE UP (ref 8.4–10.5)
CHLORIDE SERPL-SCNC: 97 MMOL/L — SIGNIFICANT CHANGE UP (ref 96–108)
CO2 SERPL-SCNC: 27 MMOL/L — SIGNIFICANT CHANGE UP (ref 22–31)
CREAT SERPL-MCNC: 4.83 MG/DL — HIGH (ref 0.5–1.3)
GLUCOSE SERPL-MCNC: 125 MG/DL — HIGH (ref 70–99)
POTASSIUM SERPL-MCNC: 4.5 MMOL/L — SIGNIFICANT CHANGE UP (ref 3.5–5.3)
POTASSIUM SERPL-SCNC: 4.5 MMOL/L — SIGNIFICANT CHANGE UP (ref 3.5–5.3)
SODIUM SERPL-SCNC: 138 MMOL/L — SIGNIFICANT CHANGE UP (ref 135–145)

## 2017-06-15 PROCEDURE — 99233 SBSQ HOSP IP/OBS HIGH 50: CPT

## 2017-06-15 RX ADMIN — Medication 60 MILLIGRAM(S): at 22:01

## 2017-06-15 RX ADMIN — Medication 667 MILLIGRAM(S): at 09:10

## 2017-06-15 RX ADMIN — Medication 81 MILLIGRAM(S): at 18:12

## 2017-06-15 RX ADMIN — Medication 1 APPLICATION(S): at 18:12

## 2017-06-15 RX ADMIN — ERYTHROPOIETIN 10000 UNIT(S): 10000 INJECTION, SOLUTION INTRAVENOUS; SUBCUTANEOUS at 14:52

## 2017-06-15 RX ADMIN — Medication 100 MILLIGRAM(S): at 05:55

## 2017-06-15 RX ADMIN — Medication 667 MILLIGRAM(S): at 18:12

## 2017-06-15 RX ADMIN — VALSARTAN 40 MILLIGRAM(S): 80 TABLET ORAL at 05:55

## 2017-06-15 RX ADMIN — VALSARTAN 40 MILLIGRAM(S): 80 TABLET ORAL at 18:12

## 2017-06-15 RX ADMIN — ATORVASTATIN CALCIUM 40 MILLIGRAM(S): 80 TABLET, FILM COATED ORAL at 22:01

## 2017-06-15 RX ADMIN — ISOSORBIDE DINITRATE 20 MILLIGRAM(S): 5 TABLET ORAL at 05:55

## 2017-06-15 RX ADMIN — CARVEDILOL PHOSPHATE 3.12 MILLIGRAM(S): 80 CAPSULE, EXTENDED RELEASE ORAL at 05:55

## 2017-06-15 RX ADMIN — Medication 100 MILLIGRAM(S): at 22:01

## 2017-06-15 RX ADMIN — Medication 325 MILLIGRAM(S): at 09:10

## 2017-06-15 RX ADMIN — Medication 1 APPLICATION(S): at 05:57

## 2017-06-15 RX ADMIN — ISOSORBIDE DINITRATE 20 MILLIGRAM(S): 5 TABLET ORAL at 22:02

## 2017-06-15 RX ADMIN — Medication 325 MILLIGRAM(S): at 18:13

## 2017-06-15 RX ADMIN — Medication 1 CAPSULE(S): at 18:13

## 2017-06-15 RX ADMIN — CARVEDILOL PHOSPHATE 3.12 MILLIGRAM(S): 80 CAPSULE, EXTENDED RELEASE ORAL at 18:12

## 2017-06-15 NOTE — PROGRESS NOTE ADULT - SUBJECTIVE AND OBJECTIVE BOX
Patient is a 47y Male  being evaluated for   ESRD   who reports no new complaints overnight.  sob improving. Voiding sm amounts      PHYSICAL EXAM:  Vitals:T(C): 36.8, Max: 37.3 (06-14 @ 15:55)  HR: 91 (91 - 96)  BP: 149/65 (145/82 - 152/82)  RR: 19 (17 - 20)  SpO2: 95% (95% - 98%)  Wt(kg): --    General: no acute distress  HEENT:  NC, ext ears nl, oropharynx clear,  nose nl  Neck: No JVD, bruit, adenopathy or thyromegaly  Eyes: PERRL, no discharge, no icterus  Respiratory: dec bas bases bilat, no wheezes, rales, nl effort  Cardiovascular: regular rate, S1 and S2 no rub or gallop  Abd: : BS+, soft, NT , no rebound or guarding, no bruits  +permcath, AV fistula with good bruit and thrill  Extremities: 3+ edema, no cyanosis, palpable pulses      I and O's:  I & Os for current day (as of 06-15 @ 10:52)  =============================================  IN: 540 ml / OUT: 3500 ml / NET: -2960 ml            REVIEW OF SYSTEMS:  CONSTITUTIONAL: No weakness, fevers or chills  RESPIRATORY: No cough, wheezing, hemoptysis; No shortness of breath  CARDIOVASCULAR: No chest pain or palpitations  GI : no abd pain, nausea or vomiting  GENITOURINARY: No dysuria, frequency or hematuria  All other review of systems is negative unless indicated above.    Allergies    No Known Allergies    Intolerances          MEDICATIONS  (STANDING):  atorvastatin 40milliGRAM(s) Oral at bedtime  ferrous    sulfate 325milliGRAM(s) Oral two times a day with meals  dextrose 50% Injectable 12.5Gram(s) IV Push once  dextrose 50% Injectable 25Gram(s) IV Push once  dextrose 50% Injectable 25Gram(s) IV Push once  carvedilol 3.125milliGRAM(s) Oral every 12 hours  dextrose 50% Injectable 50milliLiter(s) IV Push once  aspirin enteric coated 81milliGRAM(s) Oral daily  hydrALAZINE 100milliGRAM(s) Oral three times a day  isosorbide   dinitrate Tablet (ISORDIL) 20milliGRAM(s) Oral three times a day  insulin lispro (HumaLOG) corrective regimen sliding scale  SubCutaneous three times a day before meals  insulin lispro (HumaLOG) corrective regimen sliding scale  SubCutaneous at bedtime  valsartan 40milliGRAM(s) Oral two times a day  torsemide 60milliGRAM(s) Oral daily  calcium acetate 667milliGRAM(s) Oral three times a day with meals  Nephrocaps 1Capsule(s) Oral daily  AQUAPHOR (petrolatum Ointment) 1Application(s) Topical two times a day  epoetin arian Injectable 13045Kfik(s) IV Push <User Schedule>  iron sucrose IVPB 100milliGRAM(s) IV Intermittent <User Schedule>      Sodium,Serum 138    06-15 @ 09:36  Sodium,Serum 136    06-14 @ 06:39  Sodium,Serum 137    06-13 @ 13:42  Sodium,Serum 138    06-12 @ 09:09    Potassium,Serum 4.5    06-15 @ 09:36  Potassium,Serum 4.8    06-14 @ 06:39  Potassium,Serum 4.7    06-13 @ 13:42  Potassium,Serum 4.8    06-12 @ 09:09    Chloride,Serum 97    06-15 @ 09:36  Chloride,Serum 97    06-14 @ 06:39  Chloride,Serum 95    06-13 @ 13:42  Chloride,Serum 94    06-12 @ 09:09    CO2, Serum 27    06-15 @ 09:36  CO2, Serum 26    06-14 @ 06:39  CO2, Serum 26    06-13 @ 13:42  CO2, Serum 26    06-12 @ 09:09    BUN 54    06-15 @ 09:36  BUN 78    06-14 @ 06:39  BUN 69    06-13 @ 13:42  BUN 89    06-12 @ 09:09    Creatinine, Serum 4.83    06-15 @ 09:36  Creatinine, Serum 6.22    06-14 @ 06:39  Creatinine, Serum 5.38    06-13 @ 13:42  Creatinine, Serum 6.50    06-12 @ 09:09      06-15    138  |  97  |  54<H>  ----------------------------<  125<H>  4.5   |  27  |  4.83<H>    Ca    8.5      15 Thomas 2017 09:36                              8.5    4.8   )-----------( 156      ( 14 Jun 2017 06:39 )             25.8

## 2017-06-15 NOTE — PROGRESS NOTE ADULT - ASSESSMENT
Imp-  47y Male with ESRD now on HD  systolic CMP  fluid overload improving slowly  cont phoslo  anemia of CKD   procrit with HD  HD again today for fluid removal  awaiting outpt HD arrangements for dc

## 2017-06-15 NOTE — PROGRESS NOTE ADULT - SUBJECTIVE AND OBJECTIVE BOX
Interval History:    Medications:  atorvastatin 40milliGRAM(s) Oral at bedtime  ferrous    sulfate 325milliGRAM(s) Oral two times a day with meals  acetaminophen   Tablet 650milliGRAM(s) Oral every 6 hours PRN  acetaminophen   Tablet. 650milliGRAM(s) Oral every 6 hours PRN  dextrose Gel 1Dose(s) Oral once PRN  dextrose 50% Injectable 12.5Gram(s) IV Push once  dextrose 50% Injectable 25Gram(s) IV Push once  dextrose 50% Injectable 25Gram(s) IV Push once  glucagon  Injectable 1milliGRAM(s) IntraMuscular once PRN  carvedilol 3.125milliGRAM(s) Oral every 12 hours  dextrose 50% Injectable 50milliLiter(s) IV Push once  aspirin enteric coated 81milliGRAM(s) Oral daily  hydrALAZINE 100milliGRAM(s) Oral three times a day  isosorbide   dinitrate Tablet (ISORDIL) 20milliGRAM(s) Oral three times a day  insulin lispro (HumaLOG) corrective regimen sliding scale  SubCutaneous three times a day before meals  insulin lispro (HumaLOG) corrective regimen sliding scale  SubCutaneous at bedtime  valsartan 40milliGRAM(s) Oral two times a day  torsemide 60milliGRAM(s) Oral daily  calcium acetate 667milliGRAM(s) Oral three times a day with meals  Nephrocaps 1Capsule(s) Oral daily  AQUAPHOR (petrolatum Ointment) 1Application(s) Topical two times a day  epoetin arian Injectable [id number](s) IV Push <User Schedule>  iron sucrose IVPB 100milliGRAM(s) IV Intermittent <User Schedule>    Vitals:  T(C): 36.8, Max: 37.3 (06-14 @ 15:55)  HR: 91 (91 - 96)  BP: 149/65 (145/82 - 152/82)  BP(mean): --  ABP: --  ABP(mean): --  RR: 19 (17 - 20)  SpO2: 95% (95% - 98%)  Wt(kg): --  CVP(cm H2O): --  CO: --  CI: --  PA: --  PA(mean): --  PCWP: --  SVR: --  PVR: --    Daily     Daily Weight in k.5 (2017 15:55)    I&O's Summary    I & Os for current day (as of 15 Thomas 2017 11:13)  =============================================  IN: 540 ml / OUT: 3500 ml / NET: -2960 ml      Physical Exam:  Appearance: No Acute Distress  HEENT:   Normal oral mucosa, PERRL, EOMI	  Cardiovascular: Normal S1 S2, No JVD, No murmurs/rubs/gallops  Respiratory: Clear to auscultation bilaterally  Gastrointestinal:  Soft, Non-tender	  Skin: No cyanosis	  Neurologic: Non-focal  Extremities: No clubbing, cyanosis or edema  Vascular: 2+ DP/PT pulses bilateral  Psychiatry: A & O x 3, Mood & affect appropriate    LVAD Interrogation:  Pump Flow:  Pump Speed:  Pulse Index:  Pump Power:  VAD Events:   Driveline evaluation:    Programming Changes: [ ] No changes made [ ] Changes made:  Labs:                        8.5    4.8   )-----------( 156      ( 2017 06:39 )             25.8     06-15    138  |  97  |  54<H>  ----------------------------<  125<H>  4.5   |  27  |  4.83<H>    Ca    8.5      15 Thomas 2017 09:36                        TELEMETRY: 	    ECG:      [ ] Echocardiogram: Interval History:    Medications:  atorvastatin 40milliGRAM(s) Oral at bedtime  ferrous    sulfate 325milliGRAM(s) Oral two times a day with meals  acetaminophen   Tablet 650milliGRAM(s) Oral every 6 hours PRN  acetaminophen   Tablet. 650milliGRAM(s) Oral every 6 hours PRN  dextrose Gel 1Dose(s) Oral once PRN  dextrose 50% Injectable 12.5Gram(s) IV Push once  dextrose 50% Injectable 25Gram(s) IV Push once  dextrose 50% Injectable 25Gram(s) IV Push once  glucagon  Injectable 1milliGRAM(s) IntraMuscular once PRN  carvedilol 3.125milliGRAM(s) Oral every 12 hours  dextrose 50% Injectable 50milliLiter(s) IV Push once  aspirin enteric coated 81milliGRAM(s) Oral daily  hydrALAZINE 100milliGRAM(s) Oral three times a day  isosorbide   dinitrate Tablet (ISORDIL) 20milliGRAM(s) Oral three times a day  insulin lispro (HumaLOG) corrective regimen sliding scale  SubCutaneous three times a day before meals  insulin lispro (HumaLOG) corrective regimen sliding scale  SubCutaneous at bedtime  valsartan 40milliGRAM(s) Oral two times a day  torsemide 60milliGRAM(s) Oral daily  calcium acetate 667milliGRAM(s) Oral three times a day with meals  Nephrocaps 1Capsule(s) Oral daily  AQUAPHOR (petrolatum Ointment) 1Application(s) Topical two times a day  epoetin arian Injectable [id number](s) IV Push <User Schedule>  iron sucrose IVPB 100milliGRAM(s) IV Intermittent <User Schedule>    Vitals:  T(C): 36.8, Max: 37.3 (-14 @ 15:55)  HR: 91 (91 - 96)  BP: 149/65 (145/82 - 152/82)  BP(mean): --  ABP: --  ABP(mean): --  RR: 19 (17 - 20)  SpO2: 95% (95% - 98%)  Daily Weight in k.5 (2017 15:55)    I&O's Summary    I & Os for current day (as of 15 Thomas 2017 11:13)  =============================================  IN: 540 ml / OUT: 3500 ml / NET: -2960 ml      Physical Exam:  Appearance: No Acute Distress  HEENT:   No oral cyanosis	  Cardiovascular: Normal S1 S2, mod JVD,   Respiratory: Clear to auscultation bilaterally  Gastrointestinal:  Soft, Non-tender	  Skin: No cyanosis	  Neurologic: Non-focal  Extremities: 3+  B/L LLE ankle through  thighs  Psychiatry: A & O x 3, Mood & affect appropriate    Labs:                        8.5    4.8   )-----------( 156      ( 2017 06:39 )             25.8     06-15    138  |  97  |  54<H>  ----------------------------<  125<H>  4.5   |  27  |  4.83<H>    Ca    8.5      15 Thomas 2017 09:36      TELEMETRY:  sinus 70's 80's Interval History:I'm doing ok.  My legs are a little better.    Medications:  atorvastatin 40milliGRAM(s) Oral at bedtime  ferrous    sulfate 325milliGRAM(s) Oral two times a day with meals  acetaminophen   Tablet 650milliGRAM(s) Oral every 6 hours PRN  acetaminophen   Tablet. 650milliGRAM(s) Oral every 6 hours PRN  dextrose Gel 1Dose(s) Oral once PRN  dextrose 50% Injectable 12.5Gram(s) IV Push once  dextrose 50% Injectable 25Gram(s) IV Push once  dextrose 50% Injectable 25Gram(s) IV Push once  glucagon  Injectable 1milliGRAM(s) IntraMuscular once PRN  carvedilol 3.125milliGRAM(s) Oral every 12 hours  dextrose 50% Injectable 50milliLiter(s) IV Push once  aspirin enteric coated 81milliGRAM(s) Oral daily  hydrALAZINE 100milliGRAM(s) Oral three times a day  isosorbide   dinitrate Tablet (ISORDIL) 20milliGRAM(s) Oral three times a day  insulin lispro (HumaLOG) corrective regimen sliding scale  SubCutaneous three times a day before meals  insulin lispro (HumaLOG) corrective regimen sliding scale  SubCutaneous at bedtime  valsartan 40milliGRAM(s) Oral two times a day  torsemide 60milliGRAM(s) Oral daily  calcium acetate 667milliGRAM(s) Oral three times a day with meals  Nephrocaps 1Capsule(s) Oral daily  AQUAPHOR (petrolatum Ointment) 1Application(s) Topical two times a day  epoetin arian Injectable [id number](s) IV Push <User Schedule>  iron sucrose IVPB 100milliGRAM(s) IV Intermittent <User Schedule>    Vitals:  T(C): 36.8, Max: 37.3 (-14 @ 15:55)  HR: 91 (91 - 96)  BP: 149/65 (145/82 - 152/82)  BP(mean): --  ABP: --  ABP(mean): --  RR: 19 (17 - 20)  SpO2: 95% (95% - 98%)  Daily Weight in k.5 (2017 15:55)    I&O's Summary    I & Os for current day (as of 15 Thomas 2017 11:13)  =============================================  IN: 540 ml / OUT: 3500 ml / NET: -2960 ml      Physical Exam:  Appearance: No Acute Distress  HEENT:   No oral cyanosis	  Cardiovascular: Normal S1 S2, mod JVD,   Respiratory: Clear to auscultation bilaterally  Gastrointestinal:  Soft, Non-tender	  Skin: No cyanosis	  Neurologic: Non-focal  Extremities: 3+  B/L LLE ankle through  thighs  Psychiatry: A & O x 3, Mood & affect appropriate    Labs:                        8.5    4.8   )-----------( 156      ( 2017 06:39 )             25.8     06-15    138  |  97  |  54<H>  ----------------------------<  125<H>  4.5   |  27  |  4.83<H>    Ca    8.5      15 Thomas 2017 09:36      TELEMETRY:  sinus 70's 80's Interval History:    He is slowly improving, but remains massively volume overloaded.    Medications:  atorvastatin 40milliGRAM(s) Oral at bedtime  ferrous    sulfate 325milliGRAM(s) Oral two times a day with meals  acetaminophen   Tablet 650milliGRAM(s) Oral every 6 hours PRN  acetaminophen   Tablet. 650milliGRAM(s) Oral every 6 hours PRN  dextrose Gel 1Dose(s) Oral once PRN  dextrose 50% Injectable 12.5Gram(s) IV Push once  dextrose 50% Injectable 25Gram(s) IV Push once  dextrose 50% Injectable 25Gram(s) IV Push once  glucagon  Injectable 1milliGRAM(s) IntraMuscular once PRN  carvedilol 3.125milliGRAM(s) Oral every 12 hours  dextrose 50% Injectable 50milliLiter(s) IV Push once  aspirin enteric coated 81milliGRAM(s) Oral daily  hydrALAZINE 100milliGRAM(s) Oral three times a day  isosorbide   dinitrate Tablet (ISORDIL) 20milliGRAM(s) Oral three times a day  insulin lispro (HumaLOG) corrective regimen sliding scale  SubCutaneous three times a day before meals  insulin lispro (HumaLOG) corrective regimen sliding scale  SubCutaneous at bedtime  valsartan 40milliGRAM(s) Oral two times a day  torsemide 60milliGRAM(s) Oral daily  calcium acetate 667milliGRAM(s) Oral three times a day with meals  Nephrocaps 1Capsule(s) Oral daily  AQUAPHOR (petrolatum Ointment) 1Application(s) Topical two times a day  epoetin arian Injectable 17583Lynz(s) IV Push <User Schedule>  iron sucrose IVPB 100milliGRAM(s) IV Intermittent <User Schedule>    Vitals:  T(C): 36.8, Max: 37.3 (06-14 @ 15:55)  HR: 91 (91 - 96)  BP: 149/65 (145/82 - 152/82)  BP(mean): --  ABP: --  ABP(mean): --  RR: 19 (17 - 20)  SpO2: 95% (95% - 98%)  Daily Weight in k.5 (2017 15:55)    I&O's Summary    I & Os for current day (as of 15 Thomas 2017 11:13)  =============================================  IN: 540 ml / OUT: 3500 ml / NET: -2960 ml      Physical Exam:  Appearance: No Acute Distress  HEENT:   No oral cyanosis	  Cardiovascular: Normal S1 S2, mod JVD,   Respiratory: Clear to auscultation bilaterally  Gastrointestinal:  Soft, Non-tender	  Skin: No cyanosis	  Neurologic: Non-focal  Extremities: 3+  B/L LLE ankle through  thighs  Psychiatry: A & O x 3, Mood & affect appropriate    Labs:                        8.5    4.8   )-----------( 156      ( 2017 06:39 )             25.8     06-15    138  |  97  |  54<H>  ----------------------------<  125<H>  4.5   |  27  |  4.83<H>    Ca    8.5      15 Thomas 2017 09:36      TELEMETRY:  sinus 70's 80's

## 2017-06-15 NOTE — PROGRESS NOTE ADULT - ATTENDING COMMENTS
He remains severely volume overloaded. He will need several more days of ultrafiltration prior to discharge.

## 2017-06-15 NOTE — PROGRESS NOTE ADULT - PROBLEM SELECTOR PLAN 1
We will continue daily UF per nephrology to achieve euvolemia  He needs improved vasodilation. If BP is an issue in HD, we can decrease the dose of vasodilators. We will continue daily UF per nephrology to achieve euvolemia

## 2017-06-15 NOTE — PROGRESS NOTE ADULT - ASSESSMENT
45yo male w/ PMH HFrEF (17% 4/26/17), CAD s/p CABG x4 2016, ICM s/p recent AICD 5/2017, DM2 c/b peripheral neuropathy w/ toe ulcers presents with 6 days of progressive swelling, orthopnea, dyspnea w GIN on CKD5 requiring initiation of HD and control of HTN  Discharge planning in progress with possible follow in [facility] .

## 2017-06-15 NOTE — PROCEDURE NOTE - ADDITIONAL PROCEDURE DETAILS
Battery 11.4 years  Pacing Mode: VVI at 40bpm   V Paced <0.1%   AF 0%   Optivol fluid index below threshold indicating euvolemia   Normal sensing and pacing thresholds. Normal lead impedance  Review of stored data did not reveal any episodes for review

## 2017-06-15 NOTE — PROGRESS NOTE ADULT - SUBJECTIVE AND OBJECTIVE BOX
Patient is a 47y old  Male who presents with a chief complaint of acute on chronic heart failure, acute renal failure (31 May 2017 09:48)      SUBJECTIVE / OVERNIGHT EVENTS: Comfortable  Review of Systems  chest pain no  palpitations no  sob yes but imroving  nausea no  headache no    MEDICATIONS  (STANDING):  atorvastatin 40milliGRAM(s) Oral at bedtime  ferrous    sulfate 325milliGRAM(s) Oral two times a day with meals  dextrose 50% Injectable 12.5Gram(s) IV Push once  dextrose 50% Injectable 25Gram(s) IV Push once  dextrose 50% Injectable 25Gram(s) IV Push once  carvedilol 3.125milliGRAM(s) Oral every 12 hours  dextrose 50% Injectable 50milliLiter(s) IV Push once  aspirin enteric coated 81milliGRAM(s) Oral daily  hydrALAZINE 100milliGRAM(s) Oral three times a day  isosorbide   dinitrate Tablet (ISORDIL) 20milliGRAM(s) Oral three times a day  insulin lispro (HumaLOG) corrective regimen sliding scale  SubCutaneous three times a day before meals  insulin lispro (HumaLOG) corrective regimen sliding scale  SubCutaneous at bedtime  valsartan 40milliGRAM(s) Oral two times a day  torsemide 60milliGRAM(s) Oral daily  calcium acetate 667milliGRAM(s) Oral three times a day with meals  Nephrocaps 1Capsule(s) Oral daily  AQUAPHOR (petrolatum Ointment) 1Application(s) Topical two times a day  epoetin arian Injectable 18100Vjil(s) IV Push <User Schedule>  iron sucrose IVPB 100milliGRAM(s) IV Intermittent <User Schedule>    MEDICATIONS  (PRN):  acetaminophen   Tablet 650milliGRAM(s) Oral every 6 hours PRN For Temp greater than 38.5 C (101.3 F)  acetaminophen   Tablet. 650milliGRAM(s) Oral every 6 hours PRN mild-moderate pain  dextrose Gel 1Dose(s) Oral once PRN Blood Glucose LESS THAN 70 milliGRAM(s)/deciliter  glucagon  Injectable 1milliGRAM(s) IntraMuscular once PRN Glucose LESS THAN 70 milligrams/deciliter      Vital Signs Last 24 Hrs  T(C): 36.8, Max: 37.1 (06-15 @ 12:10)  HR: 92 (91 - 95)  BP: 155/90 (148/85 - 155/90)  RR: 18 (18 - 20)  SpO2: 98% (95% - 98%)  Wt(kg): --  CAPILLARY BLOOD GLUCOSE  127 (15 Thomas 2017 08:30)  223 (14 Jun 2017 22:38)    I&O's Summary  I & Os for 24h ending 15 Thomas 2017 07:00  =============================================  IN: 540 ml / OUT: 3500 ml / NET: -2960 ml    I & Os for current day (as of 15 Thomas 2017 17:57)  =============================================  IN: 240 ml / OUT: 3400 ml / NET: -3160 ml      PHYSICAL EXAM:  GENERAL: NAD, well-developed  HEAD:  Atraumatic, Normocephalic  EYES: EOMI, PERRLA, conjunctiva and sclera clear  NECK: Supple, No JVD  CHEST/LUNG: Clear to auscultation bilaterally; No wheeze  HEART: Regular rate and rhythm; No murmurs, rubs, or gallops  ABDOMEN: Soft, Nontender, Nondistended; Bowel sounds present  EXTREMITIES:  2+ Peripheral Pulses, No clubbing, cyanosis 3+ edema bilateral  PSYCH: AAOx3  NEUROLOGY: non-focal  SKIN: No rashes or lesions    LABS:                        8.5    4.8   )-----------( 156      ( 14 Jun 2017 06:39 )             25.8     06-15    138  |  97  |  54<H>  ----------------------------<  125<H>  4.5   |  27  |  4.83<H>    Ca    8.5      15 Thomas 2017 09:36                RADIOLOGY & ADDITIONAL TESTS:    Imaging Personally Reviewed:    Consultant(s) Notes Reviewed:      Care Discussed with Consultants/Other Providers:

## 2017-06-16 PROCEDURE — 99233 SBSQ HOSP IP/OBS HIGH 50: CPT

## 2017-06-16 RX ADMIN — Medication 1 CAPSULE(S): at 13:39

## 2017-06-16 RX ADMIN — Medication 1 APPLICATION(S): at 17:38

## 2017-06-16 RX ADMIN — IRON SUCROSE 210 MILLIGRAM(S): 20 INJECTION, SOLUTION INTRAVENOUS at 22:30

## 2017-06-16 RX ADMIN — VALSARTAN 40 MILLIGRAM(S): 80 TABLET ORAL at 17:37

## 2017-06-16 RX ADMIN — Medication 81 MILLIGRAM(S): at 13:37

## 2017-06-16 RX ADMIN — Medication 100 MILLIGRAM(S): at 05:15

## 2017-06-16 RX ADMIN — Medication 1 APPLICATION(S): at 05:16

## 2017-06-16 RX ADMIN — Medication 100 MILLIGRAM(S): at 13:37

## 2017-06-16 RX ADMIN — ATORVASTATIN CALCIUM 40 MILLIGRAM(S): 80 TABLET, FILM COATED ORAL at 23:52

## 2017-06-16 RX ADMIN — CARVEDILOL PHOSPHATE 3.12 MILLIGRAM(S): 80 CAPSULE, EXTENDED RELEASE ORAL at 05:15

## 2017-06-16 RX ADMIN — Medication 325 MILLIGRAM(S): at 17:37

## 2017-06-16 RX ADMIN — ISOSORBIDE DINITRATE 20 MILLIGRAM(S): 5 TABLET ORAL at 13:37

## 2017-06-16 RX ADMIN — Medication 667 MILLIGRAM(S): at 17:37

## 2017-06-16 RX ADMIN — CARVEDILOL PHOSPHATE 3.12 MILLIGRAM(S): 80 CAPSULE, EXTENDED RELEASE ORAL at 17:37

## 2017-06-16 RX ADMIN — VALSARTAN 40 MILLIGRAM(S): 80 TABLET ORAL at 05:15

## 2017-06-16 RX ADMIN — Medication 667 MILLIGRAM(S): at 09:22

## 2017-06-16 RX ADMIN — Medication 667 MILLIGRAM(S): at 13:37

## 2017-06-16 RX ADMIN — Medication 325 MILLIGRAM(S): at 09:22

## 2017-06-16 RX ADMIN — Medication 60 MILLIGRAM(S): at 05:16

## 2017-06-16 RX ADMIN — Medication 2: at 13:37

## 2017-06-16 RX ADMIN — ISOSORBIDE DINITRATE 20 MILLIGRAM(S): 5 TABLET ORAL at 05:15

## 2017-06-16 NOTE — PROGRESS NOTE ADULT - PROBLEM SELECTOR PLAN 1
We will continue daily UF per nephrology to achieve euvolemia We will continue daily UF per nephrology to achieve euvolemia.  Discontinue torsemide as patient is anuric

## 2017-06-16 NOTE — PROGRESS NOTE ADULT - SUBJECTIVE AND OBJECTIVE BOX
Patient is a 47y Male  being evaluated for   ESRD   who reports no new complaints overnight.  sob improved      PHYSICAL EXAM:  Vital Signs Last 24 Hrs  T(C): 37.1, Max: 37.1 (06-15 @ 12:10)  T(F): 98.8, Max: 98.8 (06-15 @ 12:10)  HR: 86 (86 - 97)  BP: 133/70 (133/70 - 156/82)  BP(mean): --  RR: 18 (18 - 18)  SpO2: 98% (95% - 98%)  General: no acute distress  HEENT:  NC, ext ears nl, oropharynx clear,  nose nl  Neck: No JVD, bruit, adenopathy or thyromegaly  Eyes: PERRL, no discharge, no icterus  Respiratory: dec bas bases bilat, no wheezes, rales, nl effort  Cardiovascular: regular rate, S1 and S2 no rub or gallop  Abd: : BS+, soft, NT , no rebound or guarding, no bruits  +permcath, AV fistula with good bruit and thrill  Extremities: 3+ edema, no cyanosis, palpable pulses      I and O's:  I & Os for current day (as of 06-15 @ 10:52)  =============================================  IN: 540 ml / OUT: 3500 ml / NET: -2960 ml            REVIEW OF SYSTEMS:  CONSTITUTIONAL: No weakness, fevers or chills  RESPIRATORY: No cough, wheezing, hemoptysis; No shortness of breath  CARDIOVASCULAR: No chest pain or palpitations  GI : no abd pain, nausea or vomiting  GENITOURINARY: No dysuria, frequency or hematuria  All other review of systems is negative unless indicated above.    Allergies    No Known Allergies    Intolerances          MEDICATIONS  (STANDING):  atorvastatin 40milliGRAM(s) Oral at bedtime  ferrous    sulfate 325milliGRAM(s) Oral two times a day with meals  dextrose 50% Injectable 12.5Gram(s) IV Push once  dextrose 50% Injectable 25Gram(s) IV Push once  dextrose 50% Injectable 25Gram(s) IV Push once  carvedilol 3.125milliGRAM(s) Oral every 12 hours  dextrose 50% Injectable 50milliLiter(s) IV Push once  aspirin enteric coated 81milliGRAM(s) Oral daily  hydrALAZINE 100milliGRAM(s) Oral three times a day  isosorbide   dinitrate Tablet (ISORDIL) 20milliGRAM(s) Oral three times a day  insulin lispro (HumaLOG) corrective regimen sliding scale  SubCutaneous three times a day before meals  insulin lispro (HumaLOG) corrective regimen sliding scale  SubCutaneous at bedtime  valsartan 40milliGRAM(s) Oral two times a day  torsemide 60milliGRAM(s) Oral daily  calcium acetate 667milliGRAM(s) Oral three times a day with meals  Nephrocaps 1Capsule(s) Oral daily  AQUAPHOR (petrolatum Ointment) 1Application(s) Topical two times a day  epoetin arian Injectable [id number](s) IV Push <User Schedule>  iron sucrose IVPB 100milliGRAM(s) IV Intermittent <User Schedule>      Sodium,Serum 138    06-15 @ 09:36  Sodium,Serum 136    06-14 @ 06:39  Sodium,Serum 137    06-13 @ 13:42  Sodium,Serum 138    06-12 @ 09:09    Potassium,Serum 4.5    06-15 @ 09:36  Potassium,Serum 4.8    06-14 @ 06:39  Potassium,Serum 4.7    06-13 @ 13:42  Potassium,Serum 4.8    06-12 @ 09:09    Chloride,Serum 97    06-15 @ 09:36  Chloride,Serum 97    06-14 @ 06:39  Chloride,Serum 95    06-13 @ 13:42  Chloride,Serum 94    06-12 @ 09:09    CO2, Serum 27    06-15 @ 09:36  CO2, Serum 26    06-14 @ 06:39  CO2, Serum 26    06-13 @ 13:42  CO2, Serum 26    06-12 @ 09:09    BUN 54    06-15 @ 09:36  BUN 78    06-14 @ 06:39  BUN 69    06-13 @ 13:42  BUN 89    06-12 @ 09:09    Creatinine, Serum 4.83    06-15 @ 09:36  Creatinine, Serum 6.22    06-14 @ 06:39  Creatinine, Serum 5.38    06-13 @ 13:42  Creatinine, Serum 6.50    06-12 @ 09:09      06-15    138  |  97  |  54<H>  ----------------------------<  125<H>  4.5   |  27  |  4.83<H>    Ca    8.5      15 Thomas 2017 09:36                              8.5    4.8   )-----------( 156      ( 14 Jun 2017 06:39 )             25.8

## 2017-06-16 NOTE — PROGRESS NOTE ADULT - SUBJECTIVE AND OBJECTIVE BOX
Patient is a 47y old  Male who presents with a chief complaint of acute on chronic heart failure, acute renal failure (31 May 2017 09:48)      SUBJECTIVE / OVERNIGHT EVENTS: Weak, tired.  Review of Systems  chest pain no  palpitations no  sob no  nausea no  headache no    MEDICATIONS  (STANDING):  atorvastatin 40milliGRAM(s) Oral at bedtime  ferrous    sulfate 325milliGRAM(s) Oral two times a day with meals  dextrose 50% Injectable 12.5Gram(s) IV Push once  dextrose 50% Injectable 25Gram(s) IV Push once  dextrose 50% Injectable 25Gram(s) IV Push once  carvedilol 3.125milliGRAM(s) Oral every 12 hours  dextrose 50% Injectable 50milliLiter(s) IV Push once  aspirin enteric coated 81milliGRAM(s) Oral daily  hydrALAZINE 100milliGRAM(s) Oral three times a day  isosorbide   dinitrate Tablet (ISORDIL) 20milliGRAM(s) Oral three times a day  insulin lispro (HumaLOG) corrective regimen sliding scale  SubCutaneous three times a day before meals  insulin lispro (HumaLOG) corrective regimen sliding scale  SubCutaneous at bedtime  valsartan 40milliGRAM(s) Oral two times a day  calcium acetate 667milliGRAM(s) Oral three times a day with meals  Nephrocaps 1Capsule(s) Oral daily  AQUAPHOR (petrolatum Ointment) 1Application(s) Topical two times a day  epoetin arian Injectable 69015Guld(s) IV Push <User Schedule>  iron sucrose IVPB 100milliGRAM(s) IV Intermittent <User Schedule>    MEDICATIONS  (PRN):  acetaminophen   Tablet 650milliGRAM(s) Oral every 6 hours PRN For Temp greater than 38.5 C (101.3 F)  acetaminophen   Tablet. 650milliGRAM(s) Oral every 6 hours PRN mild-moderate pain  dextrose Gel 1Dose(s) Oral once PRN Blood Glucose LESS THAN 70 milliGRAM(s)/deciliter  glucagon  Injectable 1milliGRAM(s) IntraMuscular once PRN Glucose LESS THAN 70 milligrams/deciliter      Vital Signs Last 24 Hrs  T(C): 36.8, Max: 37.1 (06-16 @ 04:05)  HR: 77 (77 - 97)  BP: 157/76 (133/70 - 157/76)  RR: 18 (18 - 18)  SpO2: 95% (95% - 98%)  Wt(kg): --  CAPILLARY BLOOD GLUCOSE  201 (16 Jun 2017 13:00)  130 (16 Jun 2017 08:48)  210 (15 Thomas 2017 21:33)  137 (15 Thomas 2017 18:14)    I&O's Summary    I & Os for current day (as of 16 Jun 2017 17:36)  =============================================  IN: 940 ml / OUT: 3400 ml / NET: -2460 ml      PHYSICAL EXAM:  GENERAL: NAD, well-developed  HEAD:  Atraumatic, Normocephalic  EYES: EOMI, PERRLA, conjunctiva and sclera clear  NECK: Supple, No JVD  CHEST/LUNG: Clear to auscultation bilaterally; No wheeze  HEART: Regular rate and rhythm; No murmurs, rubs, or gallops  ABDOMEN: Soft, Nontender, Nondistended; Bowel sounds present Ascites  EXTREMITIES:  2+ Peripheral Pulses, No clubbing, cyanosis3+edema  PSYCH: AAOx3  NEUROLOGY: non-focal  SKIN: No rashes or lesions    LABS:    06-15    138  |  97  |  54<H>  ----------------------------<  125<H>  4.5   |  27  |  4.83<H>    Ca    8.5      15 Thomas 2017 09:36                RADIOLOGY & ADDITIONAL TESTS:    Imaging Personally Reviewed:    Consultant(s) Notes Reviewed:      Care Discussed with Consultants/Other Providers:

## 2017-06-16 NOTE — PROGRESS NOTE ADULT - ASSESSMENT
47yo male w/ PMH HFrEF (17% 4/26/17), CAD s/p CABG x4 2016, ICM s/p recent AICD 5/2017, DM2 c/b peripheral neuropathy w/ toe ulcers presents with 6 days of progressive swelling, orthopnea, dyspnea w GIN on CKD5 requiring initiation of HD and control of HTN  Discharge planning in progress with possible follow in [facility] .

## 2017-06-16 NOTE — PROGRESS NOTE ADULT - SUBJECTIVE AND OBJECTIVE BOX
Interval History:    Medications:  atorvastatin 40milliGRAM(s) Oral at bedtime  ferrous    sulfate 325milliGRAM(s) Oral two times a day with meals  acetaminophen   Tablet 650milliGRAM(s) Oral every 6 hours PRN  acetaminophen   Tablet. 650milliGRAM(s) Oral every 6 hours PRN  dextrose Gel 1Dose(s) Oral once PRN  dextrose 50% Injectable 12.5Gram(s) IV Push once  dextrose 50% Injectable 25Gram(s) IV Push once  dextrose 50% Injectable 25Gram(s) IV Push once  glucagon  Injectable 1milliGRAM(s) IntraMuscular once PRN  carvedilol 3.125milliGRAM(s) Oral every 12 hours  dextrose 50% Injectable 50milliLiter(s) IV Push once  aspirin enteric coated 81milliGRAM(s) Oral daily  hydrALAZINE 100milliGRAM(s) Oral three times a day  isosorbide   dinitrate Tablet (ISORDIL) 20milliGRAM(s) Oral three times a day  insulin lispro (HumaLOG) corrective regimen sliding scale  SubCutaneous three times a day before meals  insulin lispro (HumaLOG) corrective regimen sliding scale  SubCutaneous at bedtime  valsartan 40milliGRAM(s) Oral two times a day  torsemide 60milliGRAM(s) Oral daily  calcium acetate 667milliGRAM(s) Oral three times a day with meals  Nephrocaps 1Capsule(s) Oral daily  AQUAPHOR (petrolatum Ointment) 1Application(s) Topical two times a day  epoetin arian Injectable [id number](s) IV Push <User Schedule>  iron sucrose IVPB 100milliGRAM(s) IV Intermittent <User Schedule>    Vitals:  T(C): 37.1, Max: 37.1 (06-15 @ 12:10)  HR: 86 (86 - 97)  BP: 133/70 (133/70 - 156/82)  BP(mean): --  ABP: --  ABP(mean): --  RR: 18 (18 - 18)  SpO2: 98% (95% - 98%)  Wt(kg): --  CVP(cm H2O): --  CO: --  CI: --  PA: --  PA(mean): --  PCWP: --  SVR: --  PVR: --    Daily     Daily Weight in k.3 (2017 04:05)    I&O's Summary    I & Os for current day (as of 2017 11:52)  =============================================  IN: 940 ml / OUT: 3400 ml / NET: -2460 ml      Physical Exam:  Appearance: No Acute Distress  HEENT:   Normal oral mucosa, PERRL, EOMI	  Cardiovascular: Normal S1 S2, No JVD, No murmurs/rubs/gallops  Respiratory: Clear to auscultation bilaterally  Gastrointestinal:  Soft, Non-tender	  Skin: No cyanosis	  Neurologic: Non-focal  Extremities: No clubbing, cyanosis or edema  Vascular: 2+ DP/PT pulses bilateral  Psychiatry: A & O x 3, Mood & affect appropriate    LVAD Interrogation:  Pump Flow:  Pump Speed:  Pulse Index:  Pump Power:  VAD Events:   Driveline evaluation:    Programming Changes: [ ] No changes made [ ] Changes made:  Labs:    06-15    138  |  97  |  54<H>  ----------------------------<  125<H>  4.5   |  27  |  4.83<H>    Ca    8.5      15 Thomas 2017 09:36                        TELEMETRY: 	    ECG:      [ ] Echocardiogram: Interval History :'I'm feeling lighter" Ambulating in hallway using walker.    Medications:  atorvastatin 40milliGRAM(s) Oral at bedtime  ferrous    sulfate 325milliGRAM(s) Oral two times a day with meals  acetaminophen   Tablet 650milliGRAM(s) Oral every 6 hours PRN  acetaminophen   Tablet. 650milliGRAM(s) Oral every 6 hours PRN  dextrose Gel 1Dose(s) Oral once PRN  dextrose 50% Injectable 12.5Gram(s) IV Push once  dextrose 50% Injectable 25Gram(s) IV Push once  dextrose 50% Injectable 25Gram(s) IV Push once  glucagon  Injectable 1milliGRAM(s) IntraMuscular once PRN  carvedilol 3.125milliGRAM(s) Oral every 12 hours  dextrose 50% Injectable 50milliLiter(s) IV Push once  aspirin enteric coated 81milliGRAM(s) Oral daily  hydrALAZINE 100milliGRAM(s) Oral three times a day  isosorbide   dinitrate Tablet (ISORDIL) 20milliGRAM(s) Oral three times a day  insulin lispro (HumaLOG) corrective regimen sliding scale  SubCutaneous three times a day before meals  insulin lispro (HumaLOG) corrective regimen sliding scale  SubCutaneous at bedtime  valsartan 40milliGRAM(s) Oral two times a day  torsemide 60milliGRAM(s) Oral daily  calcium acetate 667milliGRAM(s) Oral three times a day with meals  Nephrocaps 1Capsule(s) Oral daily  AQUAPHOR (petrolatum Ointment) 1Application(s) Topical two times a day  epoetin arian Injectable 93494Oxhl(s) IV Push <User Schedule>  iron sucrose IVPB 100milliGRAM(s) IV Intermittent <User Schedule>    Vitals:  T(C): 37.1, Max: 37.1 (06-15 @ 12:10)  HR: 86 (86 - 97)  BP: 133/70 (133/70 - 156/82)  RR: 18 (18 - 18)  SpO2: 98% (95% - 98%     Daily Weight in k.3 243.1 lbs (2017 04:05)    I&O's Summary    I & Os for current day (as of 2017 11:52)  =============================================  IN: 940 ml / OUT: 3400 ml / NET: -2460 ml      Physical Exam:  Appearance: No Acute Distress  HEENT:   No cyanosis	  Cardiovascular: Normal S1 S2, Moderate  JVD  Respiratory: Clear to auscultation bilaterally  Gastrointestinal:  Soft, Non-tender	  Skin: No cyanosis	  Neurologic: Non-focal  Extremities: 3+ pitting/nonpitting edema with blistering ankles through thighs.     Vascular: 2+ DP/PT pulses bilateral  Psychiatry: A & O x 3, Mood & affect appropriate    Labs:    06-15    138  |  97  |  54<H>  ----------------------------<  125<H>  4.5   |  27  |  4.83<H>    Ca    8.5      15 Thomas 2017 09:36      TELEMETRY: Sinus 80's -90's , occ PVC's	  :

## 2017-06-16 NOTE — PROGRESS NOTE ADULT - ASSESSMENT
Imp-  47y Male with ESRD now on HD  systolic CMP  fluid overload improving slowly  cont phoslo  anemia of CKD   procrit with HD  HD again today for fluid removal  out pt hd plans in progress  dw pt

## 2017-06-17 LAB
ANION GAP SERPL CALC-SCNC: 12 MMOL/L — SIGNIFICANT CHANGE UP (ref 5–17)
BUN SERPL-MCNC: 32 MG/DL — HIGH (ref 7–23)
CALCIUM SERPL-MCNC: 8.2 MG/DL — LOW (ref 8.4–10.5)
CHLORIDE SERPL-SCNC: 99 MMOL/L — SIGNIFICANT CHANGE UP (ref 96–108)
CO2 SERPL-SCNC: 28 MMOL/L — SIGNIFICANT CHANGE UP (ref 22–31)
CREAT SERPL-MCNC: 3.38 MG/DL — HIGH (ref 0.5–1.3)
GLUCOSE SERPL-MCNC: 116 MG/DL — HIGH (ref 70–99)
POTASSIUM SERPL-MCNC: 4 MMOL/L — SIGNIFICANT CHANGE UP (ref 3.5–5.3)
POTASSIUM SERPL-SCNC: 4 MMOL/L — SIGNIFICANT CHANGE UP (ref 3.5–5.3)
SODIUM SERPL-SCNC: 139 MMOL/L — SIGNIFICANT CHANGE UP (ref 135–145)

## 2017-06-17 RX ADMIN — ISOSORBIDE DINITRATE 20 MILLIGRAM(S): 5 TABLET ORAL at 06:17

## 2017-06-17 RX ADMIN — Medication 667 MILLIGRAM(S): at 18:28

## 2017-06-17 RX ADMIN — Medication 1 CAPSULE(S): at 13:50

## 2017-06-17 RX ADMIN — Medication 667 MILLIGRAM(S): at 13:48

## 2017-06-17 RX ADMIN — Medication 1 APPLICATION(S): at 06:17

## 2017-06-17 RX ADMIN — Medication 100 MILLIGRAM(S): at 13:49

## 2017-06-17 RX ADMIN — Medication: at 18:31

## 2017-06-17 RX ADMIN — Medication 325 MILLIGRAM(S): at 18:28

## 2017-06-17 RX ADMIN — VALSARTAN 40 MILLIGRAM(S): 80 TABLET ORAL at 18:31

## 2017-06-17 RX ADMIN — CARVEDILOL PHOSPHATE 3.12 MILLIGRAM(S): 80 CAPSULE, EXTENDED RELEASE ORAL at 06:17

## 2017-06-17 RX ADMIN — Medication 667 MILLIGRAM(S): at 09:28

## 2017-06-17 RX ADMIN — CARVEDILOL PHOSPHATE 3.12 MILLIGRAM(S): 80 CAPSULE, EXTENDED RELEASE ORAL at 18:28

## 2017-06-17 RX ADMIN — Medication: at 13:51

## 2017-06-17 RX ADMIN — ISOSORBIDE DINITRATE 20 MILLIGRAM(S): 5 TABLET ORAL at 13:50

## 2017-06-17 RX ADMIN — Medication 100 MILLIGRAM(S): at 06:17

## 2017-06-17 RX ADMIN — Medication 325 MILLIGRAM(S): at 09:28

## 2017-06-17 RX ADMIN — Medication 1 APPLICATION(S): at 18:29

## 2017-06-17 RX ADMIN — VALSARTAN 40 MILLIGRAM(S): 80 TABLET ORAL at 06:17

## 2017-06-17 RX ADMIN — Medication 81 MILLIGRAM(S): at 13:49

## 2017-06-17 NOTE — PROGRESS NOTE ADULT - ASSESSMENT
Imp-  47y Male with ESRD now on HD  systolic CMP  fluid overload improving slowly  cont phoslo  hd today as scheduled   dw pt  anemia of CKD   procrit with HD  HD again today for fluid removal  out pt hd plans in progress  dw pt

## 2017-06-17 NOTE — PROGRESS NOTE ADULT - SUBJECTIVE AND OBJECTIVE BOX
Patient is a 47y old  Male who presents with a chief complaint of acute on chronic heart failure, acute renal failure (31 May 2017 09:48)      SUBJECTIVE / OVERNIGHT EVENTS:  Review of Systems  chest pain no  palpitations no  sob no  nausea no  headache no    MEDICATIONS  (STANDING):  atorvastatin 40milliGRAM(s) Oral at bedtime  ferrous    sulfate 325milliGRAM(s) Oral two times a day with meals  dextrose 50% Injectable 12.5Gram(s) IV Push once  dextrose 50% Injectable 25Gram(s) IV Push once  dextrose 50% Injectable 25Gram(s) IV Push once  carvedilol 3.125milliGRAM(s) Oral every 12 hours  dextrose 50% Injectable 50milliLiter(s) IV Push once  aspirin enteric coated 81milliGRAM(s) Oral daily  hydrALAZINE 100milliGRAM(s) Oral three times a day  isosorbide   dinitrate Tablet (ISORDIL) 20milliGRAM(s) Oral three times a day  insulin lispro (HumaLOG) corrective regimen sliding scale  SubCutaneous three times a day before meals  insulin lispro (HumaLOG) corrective regimen sliding scale  SubCutaneous at bedtime  valsartan 40milliGRAM(s) Oral two times a day  calcium acetate 667milliGRAM(s) Oral three times a day with meals  Nephrocaps 1Capsule(s) Oral daily  AQUAPHOR (petrolatum Ointment) 1Application(s) Topical two times a day  epoetin arian Injectable 97412Unlg(s) IV Push <User Schedule>  iron sucrose IVPB 100milliGRAM(s) IV Intermittent <User Schedule>    MEDICATIONS  (PRN):  acetaminophen   Tablet 650milliGRAM(s) Oral every 6 hours PRN For Temp greater than 38.5 C (101.3 F)  acetaminophen   Tablet. 650milliGRAM(s) Oral every 6 hours PRN mild-moderate pain  dextrose Gel 1Dose(s) Oral once PRN Blood Glucose LESS THAN 70 milliGRAM(s)/deciliter  glucagon  Injectable 1milliGRAM(s) IntraMuscular once PRN Glucose LESS THAN 70 milligrams/deciliter      Vital Signs Last 24 Hrs  T(C): 36.7, Max: 36.9 (06-16 @ 23:15)  HR: 85 (77 - 96)  BP: 127/73 (127/73 - 157/76)  RR: 18 (18 - 18)  SpO2: 100% (95% - 100%)  Wt(kg): --  CAPILLARY BLOOD GLUCOSE  132 (17 Jun 2017 08:51)  128 (16 Jun 2017 23:43)  155 (16 Jun 2017 18:00)  201 (16 Jun 2017 13:00)    I&O's Summary    I & Os for current day (as of 17 Jun 2017 10:52)  =============================================  IN: 700 ml / OUT: 4100 ml / NET: -3400 ml      PHYSICAL EXAM:  GENERAL: NAD, well-developed  HEAD:  Atraumatic, Normocephalic  EYES: EOMI, PERRLA, conjunctiva and sclera clear  NECK: Supple, No JVD  CHEST/LUNG: Clear to auscultation bilaterally; No wheeze  HEART: Regular rate and rhythm; No murmurs, rubs, or gallops  ABDOMEN: Soft, Nontender, Nondistended; Bowel sounds present  EXTREMITIES:  2+ Peripheral Pulses, No clubbing, cyanosis 3+bipedal edema  PSYCH: AAOx3  NEUROLOGY: non-focal  SKIN: No rashes or lesions    LABS:    06-17    139  |  99  |  32<H>  ----------------------------<  116<H>  4.0   |  28  |  3.38<H>    Ca    8.2<L>      17 Jun 2017 06:56                RADIOLOGY & ADDITIONAL TESTS:    Imaging Personally Reviewed:    Consultant(s) Notes Reviewed:      Care Discussed with Consultants/Other Providers: Patient is a 47y old  Male who presents with a chief complaint of acute on chronic heart failure, acute renal failure (31 May 2017 09:48)      SUBJECTIVE / OVERNIGHT EVENTS: comfortable, improving SOB  Review of Systems  chest pain no  palpitations no  sob yes  nausea no  headache no    MEDICATIONS  (STANDING):  atorvastatin 40milliGRAM(s) Oral at bedtime  ferrous    sulfate 325milliGRAM(s) Oral two times a day with meals  dextrose 50% Injectable 12.5Gram(s) IV Push once  dextrose 50% Injectable 25Gram(s) IV Push once  dextrose 50% Injectable 25Gram(s) IV Push once  carvedilol 3.125milliGRAM(s) Oral every 12 hours  dextrose 50% Injectable 50milliLiter(s) IV Push once  aspirin enteric coated 81milliGRAM(s) Oral daily  hydrALAZINE 100milliGRAM(s) Oral three times a day  isosorbide   dinitrate Tablet (ISORDIL) 20milliGRAM(s) Oral three times a day  insulin lispro (HumaLOG) corrective regimen sliding scale  SubCutaneous three times a day before meals  insulin lispro (HumaLOG) corrective regimen sliding scale  SubCutaneous at bedtime  valsartan 40milliGRAM(s) Oral two times a day  calcium acetate 667milliGRAM(s) Oral three times a day with meals  Nephrocaps 1Capsule(s) Oral daily  AQUAPHOR (petrolatum Ointment) 1Application(s) Topical two times a day  epoetin arian Injectable 61502Tasu(s) IV Push <User Schedule>  iron sucrose IVPB 100milliGRAM(s) IV Intermittent <User Schedule>    MEDICATIONS  (PRN):  acetaminophen   Tablet 650milliGRAM(s) Oral every 6 hours PRN For Temp greater than 38.5 C (101.3 F)  acetaminophen   Tablet. 650milliGRAM(s) Oral every 6 hours PRN mild-moderate pain  dextrose Gel 1Dose(s) Oral once PRN Blood Glucose LESS THAN 70 milliGRAM(s)/deciliter  glucagon  Injectable 1milliGRAM(s) IntraMuscular once PRN Glucose LESS THAN 70 milligrams/deciliter      Vital Signs Last 24 Hrs  T(C): 36.7, Max: 36.9 (06-16 @ 23:15)  HR: 85 (77 - 96)  BP: 127/73 (127/73 - 157/76)  RR: 18 (18 - 18)  SpO2: 100% (95% - 100%)  Wt(kg): --  CAPILLARY BLOOD GLUCOSE  132 (17 Jun 2017 08:51)  128 (16 Jun 2017 23:43)  155 (16 Jun 2017 18:00)  201 (16 Jun 2017 13:00)    I&O's Summary    I & Os for current day (as of 17 Jun 2017 10:52)  =============================================  IN: 700 ml / OUT: 4100 ml / NET: -3400 ml      PHYSICAL EXAM:  GENERAL: NAD, well-developed  HEAD:  Atraumatic, Normocephalic  EYES: EOMI, PERRLA, conjunctiva and sclera clear  NECK: Supple, No JVD  CHEST/LUNG: Clear to auscultation bilaterally; No wheeze  HEART: Regular rate and rhythm; No murmurs, rubs, or gallops  ABDOMEN: Soft, Nontender, Nondistended; Bowel sounds present  EXTREMITIES:  2+ Peripheral Pulses, No clubbing, cyanosis 3+bipedal edema  PSYCH: AAOx3  NEUROLOGY: non-focal  SKIN: No rashes or lesions    LABS:    06-17    139  |  99  |  32<H>  ----------------------------<  116<H>  4.0   |  28  |  3.38<H>    Ca    8.2<L>      17 Jun 2017 06:56                RADIOLOGY & ADDITIONAL TESTS:    Imaging Personally Reviewed:    Consultant(s) Notes Reviewed:      Care Discussed with Consultants/Other Providers:

## 2017-06-17 NOTE — PROGRESS NOTE ADULT - SUBJECTIVE AND OBJECTIVE BOX
Patient is a 47y Male  being evaluated for   ESRD   who reports no new complaints overnight.  sob improved      PHYSICAL EXAM:  Vital Signs Last 24 Hrs  T(C): 36.7, Max: 36.9 (06-16 @ 23:15)  T(F): 98.1, Max: 98.5 (06-16 @ 23:15)  HR: 85 (77 - 96)  BP: 127/73 (127/73 - 157/76)  BP(mean): --  RR: 18 (18 - 18)  SpO2: 100% (95% - 100%)  General: no acute distress  HEENT:  NC, ext ears nl, oropharynx clear,  nose nl  Neck: No JVD, bruit, adenopathy or thyromegaly  Eyes: PERRL, no discharge, no icterus  Respiratory: dec bas bases bilat, no wheezes, rales, nl effort  Cardiovascular: regular rate, S1 and S2 no rub or gallop  Abd: : BS+, soft, NT , no rebound or guarding, no bruits  +permcath, AV fistula with good bruit and thrill  Extremities: 3+ edema, no cyanosis, palpable pulses      I and O's:  I & Os for current day (as of 06-15 @ 10:52)  =============================================  IN: 540 ml / OUT: 3500 ml / NET: -2960 ml            REVIEW OF SYSTEMS:  CONSTITUTIONAL: No weakness, fevers or chills  RESPIRATORY: No cough, wheezing, hemoptysis; No shortness of breath  CARDIOVASCULAR: No chest pain or palpitations  GI : no abd pain, nausea or vomiting  GENITOURINARY: No dysuria, frequency or hematuria  All other review of systems is negative unless indicated above.    Allergies    No Known Allergies    Intolerances          MEDICATIONS  (STANDING):  atorvastatin 40milliGRAM(s) Oral at bedtime  ferrous    sulfate 325milliGRAM(s) Oral two times a day with meals  dextrose 50% Injectable 12.5Gram(s) IV Push once  dextrose 50% Injectable 25Gram(s) IV Push once  dextrose 50% Injectable 25Gram(s) IV Push once  carvedilol 3.125milliGRAM(s) Oral every 12 hours  dextrose 50% Injectable 50milliLiter(s) IV Push once  aspirin enteric coated 81milliGRAM(s) Oral daily  hydrALAZINE 100milliGRAM(s) Oral three times a day  isosorbide   dinitrate Tablet (ISORDIL) 20milliGRAM(s) Oral three times a day  insulin lispro (HumaLOG) corrective regimen sliding scale  SubCutaneous three times a day before meals  insulin lispro (HumaLOG) corrective regimen sliding scale  SubCutaneous at bedtime  valsartan 40milliGRAM(s) Oral two times a day  torsemide 60milliGRAM(s) Oral daily  calcium acetate 667milliGRAM(s) Oral three times a day with meals  Nephrocaps 1Capsule(s) Oral daily  AQUAPHOR (petrolatum Ointment) 1Application(s) Topical two times a day  epoetin arian Injectable 77336Hgzm(s) IV Push <User Schedule>  iron sucrose IVPB 100milliGRAM(s) IV Intermittent <User Schedule>      Sodium,Serum 138    06-15 @ 09:36  Sodium,Serum 136    06-14 @ 06:39  Sodium,Serum 137    06-13 @ 13:42  Sodium,Serum 138    06-12 @ 09:09    Potassium,Serum 4.5    06-15 @ 09:36  Potassium,Serum 4.8    06-14 @ 06:39  Potassium,Serum 4.7    06-13 @ 13:42  Potassium,Serum 4.8    06-12 @ 09:09    Chloride,Serum 97    06-15 @ 09:36  Chloride,Serum 97    06-14 @ 06:39  Chloride,Serum 95    06-13 @ 13:42  Chloride,Serum 94    06-12 @ 09:09    CO2, Serum 27    06-15 @ 09:36  CO2, Serum 26    06-14 @ 06:39  CO2, Serum 26    06-13 @ 13:42  CO2, Serum 26    06-12 @ 09:09    BUN 54    06-15 @ 09:36  BUN 78    06-14 @ 06:39  BUN 69    06-13 @ 13:42  BUN 89    06-12 @ 09:09    Creatinine, Serum 4.83    06-15 @ 09:36  Creatinine, Serum 6.22    06-14 @ 06:39  Creatinine, Serum 5.38    06-13 @ 13:42  Creatinine, Serum 6.50    06-12 @ 09:09      06-15    138  |  97  |  54<H>  ----------------------------<  125<H>  4.5   |  27  |  4.83<H>    Ca    8.5      15 Thomas 2017 09:36                              8.5    4.8   )-----------( 156      ( 14 Jun 2017 06:39 )             25.8

## 2017-06-18 LAB
ANION GAP SERPL CALC-SCNC: 11 MMOL/L — SIGNIFICANT CHANGE UP (ref 5–17)
BUN SERPL-MCNC: 29 MG/DL — HIGH (ref 7–23)
CALCIUM SERPL-MCNC: 8.2 MG/DL — LOW (ref 8.4–10.5)
CHLORIDE SERPL-SCNC: 100 MMOL/L — SIGNIFICANT CHANGE UP (ref 96–108)
CO2 SERPL-SCNC: 29 MMOL/L — SIGNIFICANT CHANGE UP (ref 22–31)
CREAT SERPL-MCNC: 3.24 MG/DL — HIGH (ref 0.5–1.3)
GLUCOSE SERPL-MCNC: 112 MG/DL — HIGH (ref 70–99)
POTASSIUM SERPL-MCNC: 3.9 MMOL/L — SIGNIFICANT CHANGE UP (ref 3.5–5.3)
POTASSIUM SERPL-SCNC: 3.9 MMOL/L — SIGNIFICANT CHANGE UP (ref 3.5–5.3)
SODIUM SERPL-SCNC: 140 MMOL/L — SIGNIFICANT CHANGE UP (ref 135–145)

## 2017-06-18 RX ADMIN — Medication 100 MILLIGRAM(S): at 13:29

## 2017-06-18 RX ADMIN — Medication 1 APPLICATION(S): at 05:16

## 2017-06-18 RX ADMIN — ATORVASTATIN CALCIUM 40 MILLIGRAM(S): 80 TABLET, FILM COATED ORAL at 01:20

## 2017-06-18 RX ADMIN — CARVEDILOL PHOSPHATE 3.12 MILLIGRAM(S): 80 CAPSULE, EXTENDED RELEASE ORAL at 18:14

## 2017-06-18 RX ADMIN — Medication: at 18:13

## 2017-06-18 RX ADMIN — Medication 100 MILLIGRAM(S): at 21:40

## 2017-06-18 RX ADMIN — Medication 1 APPLICATION(S): at 17:25

## 2017-06-18 RX ADMIN — Medication 100 MILLIGRAM(S): at 05:15

## 2017-06-18 RX ADMIN — Medication: at 13:29

## 2017-06-18 RX ADMIN — ISOSORBIDE DINITRATE 20 MILLIGRAM(S): 5 TABLET ORAL at 05:15

## 2017-06-18 RX ADMIN — Medication 325 MILLIGRAM(S): at 09:27

## 2017-06-18 RX ADMIN — ATORVASTATIN CALCIUM 40 MILLIGRAM(S): 80 TABLET, FILM COATED ORAL at 21:40

## 2017-06-18 RX ADMIN — ISOSORBIDE DINITRATE 20 MILLIGRAM(S): 5 TABLET ORAL at 21:40

## 2017-06-18 RX ADMIN — Medication 667 MILLIGRAM(S): at 09:27

## 2017-06-18 RX ADMIN — Medication 81 MILLIGRAM(S): at 13:26

## 2017-06-18 RX ADMIN — Medication 667 MILLIGRAM(S): at 18:14

## 2017-06-18 RX ADMIN — ISOSORBIDE DINITRATE 20 MILLIGRAM(S): 5 TABLET ORAL at 01:20

## 2017-06-18 RX ADMIN — ISOSORBIDE DINITRATE 20 MILLIGRAM(S): 5 TABLET ORAL at 13:28

## 2017-06-18 RX ADMIN — CARVEDILOL PHOSPHATE 3.12 MILLIGRAM(S): 80 CAPSULE, EXTENDED RELEASE ORAL at 05:15

## 2017-06-18 RX ADMIN — Medication 325 MILLIGRAM(S): at 18:15

## 2017-06-18 RX ADMIN — Medication 667 MILLIGRAM(S): at 13:27

## 2017-06-18 RX ADMIN — VALSARTAN 40 MILLIGRAM(S): 80 TABLET ORAL at 18:13

## 2017-06-18 RX ADMIN — VALSARTAN 40 MILLIGRAM(S): 80 TABLET ORAL at 05:15

## 2017-06-18 RX ADMIN — Medication 1 CAPSULE(S): at 13:28

## 2017-06-18 NOTE — PROGRESS NOTE ADULT - ASSESSMENT
Imp-  47y Male with ESRD now on HD  systolic CMP  fluid overload improving slowly  cont phoslo  hd tomorrow as scheduled   dw pt  anemia of CKD   procrit with HD  HD again today for fluid removal  out pt hd plans in progress  dw pt

## 2017-06-18 NOTE — PROGRESS NOTE ADULT - SUBJECTIVE AND OBJECTIVE BOX
Patient is a 47y old  Male who presents with a chief complaint of acute on chronic heart failure, acute renal failure (31 May 2017 09:48)      SUBJECTIVE / OVERNIGHT EVENTS: comfortable, improving  Review of Systems  chest pain no  palpitations no  sob no  nausea no  headache no    MEDICATIONS  (STANDING):  atorvastatin 40milliGRAM(s) Oral at bedtime  ferrous    sulfate 325milliGRAM(s) Oral two times a day with meals  dextrose 50% Injectable 12.5Gram(s) IV Push once  dextrose 50% Injectable 25Gram(s) IV Push once  dextrose 50% Injectable 25Gram(s) IV Push once  carvedilol 3.125milliGRAM(s) Oral every 12 hours  dextrose 50% Injectable 50milliLiter(s) IV Push once  aspirin enteric coated 81milliGRAM(s) Oral daily  hydrALAZINE 100milliGRAM(s) Oral three times a day  isosorbide   dinitrate Tablet (ISORDIL) 20milliGRAM(s) Oral three times a day  insulin lispro (HumaLOG) corrective regimen sliding scale  SubCutaneous three times a day before meals  insulin lispro (HumaLOG) corrective regimen sliding scale  SubCutaneous at bedtime  valsartan 40milliGRAM(s) Oral two times a day  calcium acetate 667milliGRAM(s) Oral three times a day with meals  Nephrocaps 1Capsule(s) Oral daily  AQUAPHOR (petrolatum Ointment) 1Application(s) Topical two times a day  epoetin arian Injectable 72748Tzrl(s) IV Push <User Schedule>  iron sucrose IVPB 100milliGRAM(s) IV Intermittent <User Schedule>    MEDICATIONS  (PRN):  acetaminophen   Tablet 650milliGRAM(s) Oral every 6 hours PRN For Temp greater than 38.5 C (101.3 F)  acetaminophen   Tablet. 650milliGRAM(s) Oral every 6 hours PRN mild-moderate pain  dextrose Gel 1Dose(s) Oral once PRN Blood Glucose LESS THAN 70 milliGRAM(s)/deciliter  glucagon  Injectable 1milliGRAM(s) IntraMuscular once PRN Glucose LESS THAN 70 milligrams/deciliter      Vital Signs Last 24 Hrs  T(C): 36.7, Max: 37.2 (06-17 @ 21:05)  HR: 92 (85 - 104)  BP: 149/75 (135/74 - 168/94)  RR: 18 (17 - 18)  SpO2: 96% (96% - 98%)  Wt(kg): --  CAPILLARY BLOOD GLUCOSE  218 (18 Jun 2017 12:57)  123 (18 Jun 2017 09:07)  142 (18 Jun 2017 01:08)  204 (17 Jun 2017 18:02)    I&O's Summary  I & Os for 24h ending 18 Jun 2017 07:00  =============================================  IN: 720 ml / OUT: 3000 ml / NET: -2280 ml    I & Os for current day (as of 18 Jun 2017 13:44)  =============================================  IN: 280 ml / OUT: 0 ml / NET: 280 ml      PHYSICAL EXAM:  GENERAL: NAD, well-developed  HEAD:  Atraumatic, Normocephalic  EYES: EOMI, PERRLA, conjunctiva and sclera clear  NECK: Supple, No JVD  CHEST/LUNG: Clear to auscultation bilaterally; No wheeze  HEART: Regular rate and rhythm; No murmurs, rubs, or gallops  ABDOMEN: Soft, Nontender, Nondistended; Bowel sounds present  EXTREMITIES: 3+edema  PSYCH: AAOx3  NEUROLOGY: non-focal  SKIN: No rashes or lesions    LABS:    06-18    140  |  100  |  29<H>  ----------------------------<  112<H>  3.9   |  29  |  3.24<H>    Ca    8.2<L>      18 Jun 2017 07:02                RADIOLOGY & ADDITIONAL TESTS:    Imaging Personally Reviewed:    Consultant(s) Notes Reviewed:      Care Discussed with Consultants/Other Providers:

## 2017-06-18 NOTE — PROGRESS NOTE ADULT - ASSESSMENT
45yo male w/ PMH HFrEF (17% 4/26/17), CAD s/p CABG x4 2016, ICM s/p recent AICD 5/2017, DM2 c/b peripheral neuropathy w/ toe ulcers presents with 6 days of progressive swelling, orthopnea, dyspnea w GIN on CKD5 requiring initiation of HD and control of HTN  Discharge planning in progress with possible follow in [facility] .

## 2017-06-18 NOTE — PROGRESS NOTE ADULT - SUBJECTIVE AND OBJECTIVE BOX
Patient is a 47y Male  being evaluated for   ESRD   who reports no new complaints overnight.  tired this am due to late hd last night      PHYSICAL EXAM:  Vital Signs Last 24 Hrs  T(C): 36.7, Max: 36.9 (06-16 @ 23:15)  T(F): 98.1, Max: 98.5 (06-16 @ 23:15)  HR: 85 (77 - 96)  BP: 127/73 (127/73 - 157/76)  BP(mean): --  RR: 18 (18 - 18)  SpO2: 100% (95% - 100%)  General: no acute distress  +permcath, AV fistula with good bruit and thrill  Extremities: 3+ edema, no cyanosis, palpable pulses      I and O's:  I & Os for current day (as of 06-15 @ 10:52)  =============================================  IN: 540 ml / OUT: 3500 ml / NET: -2960 ml            REVIEW OF SYSTEMS:  CONSTITUTIONAL: No weakness, fevers or chills  RESPIRATORY: No cough, wheezing, hemoptysis; No shortness of breath  CARDIOVASCULAR: No chest pain or palpitations  GI : no abd pain, nausea or vomiting  GENITOURINARY: No dysuria, frequency or hematuria  All other review of systems is negative unless indicated above.    Allergies    No Known Allergies    Intolerances          MEDICATIONS  (STANDING):  atorvastatin 40milliGRAM(s) Oral at bedtime  ferrous    sulfate 325milliGRAM(s) Oral two times a day with meals  dextrose 50% Injectable 12.5Gram(s) IV Push once  dextrose 50% Injectable 25Gram(s) IV Push once  dextrose 50% Injectable 25Gram(s) IV Push once  carvedilol 3.125milliGRAM(s) Oral every 12 hours  dextrose 50% Injectable 50milliLiter(s) IV Push once  aspirin enteric coated 81milliGRAM(s) Oral daily  hydrALAZINE 100milliGRAM(s) Oral three times a day  isosorbide   dinitrate Tablet (ISORDIL) 20milliGRAM(s) Oral three times a day  insulin lispro (HumaLOG) corrective regimen sliding scale  SubCutaneous three times a day before meals  insulin lispro (HumaLOG) corrective regimen sliding scale  SubCutaneous at bedtime  valsartan 40milliGRAM(s) Oral two times a day  torsemide 60milliGRAM(s) Oral daily  calcium acetate 667milliGRAM(s) Oral three times a day with meals  Nephrocaps 1Capsule(s) Oral daily  AQUAPHOR (petrolatum Ointment) 1Application(s) Topical two times a day  epoetin arian Injectable 62206Ixre(s) IV Push <User Schedule>  iron sucrose IVPB 100milliGRAM(s) IV Intermittent <User Schedule>      Sodium,Serum 138    06-15 @ 09:36  Sodium,Serum 136    06-14 @ 06:39  Sodium,Serum 137    06-13 @ 13:42  Sodium,Serum 138    06-12 @ 09:09    Potassium,Serum 4.5    06-15 @ 09:36  Potassium,Serum 4.8    06-14 @ 06:39  Potassium,Serum 4.7    06-13 @ 13:42  Potassium,Serum 4.8    06-12 @ 09:09    Chloride,Serum 97    06-15 @ 09:36  Chloride,Serum 97    06-14 @ 06:39  Chloride,Serum 95    06-13 @ 13:42  Chloride,Serum 94    06-12 @ 09:09    CO2, Serum 27    06-15 @ 09:36  CO2, Serum 26    06-14 @ 06:39  CO2, Serum 26    06-13 @ 13:42  CO2, Serum 26    06-12 @ 09:09    BUN 54    06-15 @ 09:36  BUN 78    06-14 @ 06:39  BUN 69    06-13 @ 13:42  BUN 89    06-12 @ 09:09    Creatinine, Serum 4.83    06-15 @ 09:36  Creatinine, Serum 6.22    06-14 @ 06:39  Creatinine, Serum 5.38    06-13 @ 13:42  Creatinine, Serum 6.50    06-12 @ 09:09      06-15    138  |  97  |  54<H>  ----------------------------<  125<H>  4.5   |  27  |  4.83<H>    Ca    8.5      15 Thomas 2017 09:36                              8.5    4.8   )-----------( 156      ( 14 Jun 2017 06:39 )             25.8

## 2017-06-19 LAB
ANION GAP SERPL CALC-SCNC: 13 MMOL/L — SIGNIFICANT CHANGE UP (ref 5–17)
BUN SERPL-MCNC: 54 MG/DL — HIGH (ref 7–23)
CALCIUM SERPL-MCNC: 8.2 MG/DL — LOW (ref 8.4–10.5)
CHLORIDE SERPL-SCNC: 99 MMOL/L — SIGNIFICANT CHANGE UP (ref 96–108)
CO2 SERPL-SCNC: 29 MMOL/L — SIGNIFICANT CHANGE UP (ref 22–31)
CREAT SERPL-MCNC: 4.93 MG/DL — HIGH (ref 0.5–1.3)
GLUCOSE SERPL-MCNC: 125 MG/DL — HIGH (ref 70–99)
HCT VFR BLD CALC: 25.8 % — LOW (ref 39–50)
HGB BLD-MCNC: 8.1 G/DL — LOW (ref 13–17)
MCHC RBC-ENTMCNC: 30.6 PG — SIGNIFICANT CHANGE UP (ref 27–34)
MCHC RBC-ENTMCNC: 31.4 GM/DL — LOW (ref 32–36)
MCV RBC AUTO: 97.5 FL — SIGNIFICANT CHANGE UP (ref 80–100)
PLATELET # BLD AUTO: 212 K/UL — SIGNIFICANT CHANGE UP (ref 150–400)
POTASSIUM SERPL-MCNC: 4.1 MMOL/L — SIGNIFICANT CHANGE UP (ref 3.5–5.3)
POTASSIUM SERPL-SCNC: 4.1 MMOL/L — SIGNIFICANT CHANGE UP (ref 3.5–5.3)
RBC # BLD: 2.65 M/UL — LOW (ref 4.2–5.8)
RBC # FLD: 17.1 % — HIGH (ref 10.3–14.5)
SODIUM SERPL-SCNC: 141 MMOL/L — SIGNIFICANT CHANGE UP (ref 135–145)
WBC # BLD: 4.7 K/UL — SIGNIFICANT CHANGE UP (ref 3.8–10.5)
WBC # FLD AUTO: 4.7 K/UL — SIGNIFICANT CHANGE UP (ref 3.8–10.5)

## 2017-06-19 PROCEDURE — 99233 SBSQ HOSP IP/OBS HIGH 50: CPT

## 2017-06-19 RX ADMIN — Medication 1 CAPSULE(S): at 09:20

## 2017-06-19 RX ADMIN — ISOSORBIDE DINITRATE 20 MILLIGRAM(S): 5 TABLET ORAL at 13:17

## 2017-06-19 RX ADMIN — Medication 325 MILLIGRAM(S): at 09:20

## 2017-06-19 RX ADMIN — ERYTHROPOIETIN 10000 UNIT(S): 10000 INJECTION, SOLUTION INTRAVENOUS; SUBCUTANEOUS at 18:27

## 2017-06-19 RX ADMIN — Medication: at 17:42

## 2017-06-19 RX ADMIN — CARVEDILOL PHOSPHATE 3.12 MILLIGRAM(S): 80 CAPSULE, EXTENDED RELEASE ORAL at 17:21

## 2017-06-19 RX ADMIN — Medication 325 MILLIGRAM(S): at 17:21

## 2017-06-19 RX ADMIN — Medication 667 MILLIGRAM(S): at 09:20

## 2017-06-19 RX ADMIN — Medication 100 MILLIGRAM(S): at 22:16

## 2017-06-19 RX ADMIN — Medication 100 MILLIGRAM(S): at 05:22

## 2017-06-19 RX ADMIN — Medication 667 MILLIGRAM(S): at 17:21

## 2017-06-19 RX ADMIN — VALSARTAN 40 MILLIGRAM(S): 80 TABLET ORAL at 17:21

## 2017-06-19 RX ADMIN — ATORVASTATIN CALCIUM 40 MILLIGRAM(S): 80 TABLET, FILM COATED ORAL at 22:16

## 2017-06-19 RX ADMIN — Medication 100 MILLIGRAM(S): at 13:17

## 2017-06-19 RX ADMIN — ISOSORBIDE DINITRATE 20 MILLIGRAM(S): 5 TABLET ORAL at 22:16

## 2017-06-19 RX ADMIN — VALSARTAN 40 MILLIGRAM(S): 80 TABLET ORAL at 05:22

## 2017-06-19 RX ADMIN — Medication 1: at 13:21

## 2017-06-19 RX ADMIN — Medication 667 MILLIGRAM(S): at 13:17

## 2017-06-19 RX ADMIN — CARVEDILOL PHOSPHATE 3.12 MILLIGRAM(S): 80 CAPSULE, EXTENDED RELEASE ORAL at 05:22

## 2017-06-19 RX ADMIN — Medication 1 APPLICATION(S): at 17:21

## 2017-06-19 RX ADMIN — Medication 1 APPLICATION(S): at 05:22

## 2017-06-19 RX ADMIN — Medication 81 MILLIGRAM(S): at 09:20

## 2017-06-19 RX ADMIN — IRON SUCROSE 210 MILLIGRAM(S): 20 INJECTION, SOLUTION INTRAVENOUS at 19:10

## 2017-06-19 RX ADMIN — ISOSORBIDE DINITRATE 20 MILLIGRAM(S): 5 TABLET ORAL at 05:22

## 2017-06-19 NOTE — PROGRESS NOTE ADULT - ATTENDING COMMENTS
He remains significantly volume overloaded. Recommend continuing daily UF. He remains significantly volume overloaded. Recommend continuing daily UF. When he is euvolemic, we will further augment vasodilators to promote LV recovery.

## 2017-06-19 NOTE — PROGRESS NOTE ADULT - ASSESSMENT
ESRD/ Hd  CAD/ cardiomyopathy  volume overload improving with increased HD/ UF  - for dialysis again today/ UF as tolerated  - epogen with HD  - discharge plans with outpatient HD

## 2017-06-19 NOTE — PROGRESS NOTE ADULT - PROBLEM SELECTOR PLAN 1
continue Rx as per heart failure team  telemetry  emphasized need for continuation of treatment/more aggressive fluid removal daily

## 2017-06-19 NOTE — PROGRESS NOTE ADULT - SUBJECTIVE AND OBJECTIVE BOX
Pt sitting on edge of bed off  o2  with noc/o  Reports  walking  in halls with no SOB or chest pain Pt remains with bilateral leg swelling and mild  to mod JVD greatly improved with less abdominal distention     PAST MEDICAL & SURGICAL HISTORY:  Acute on chronic systolic heart failure  Coronary artery disease  Foot ulcer due to secondary DM  Diabetes mellitus: type 2  AICD (automatic cardioverter/defibrillator) present  S/P CABG (coronary artery bypass graft)  Toe amputation status, left  Breast Reduction: at age 18      MEDICATIONS  (STANDING):  atorvastatin 40milliGRAM(s) Oral at bedtime  ferrous    sulfate 325milliGRAM(s) Oral two times a day with meals  dextrose 50% Injectable 12.5Gram(s) IV Push once  dextrose 50% Injectable 25Gram(s) IV Push once  dextrose 50% Injectable 25Gram(s) IV Push once  carvedilol 3.125milliGRAM(s) Oral every 12 hours  dextrose 50% Injectable 50milliLiter(s) IV Push once  aspirin enteric coated 81milliGRAM(s) Oral daily  hydrALAZINE 100milliGRAM(s) Oral three times a day  isosorbide   dinitrate Tablet (ISORDIL) 20milliGRAM(s) Oral three times a day  insulin lispro (HumaLOG) corrective regimen sliding scale  SubCutaneous three times a day before meals  insulin lispro (HumaLOG) corrective regimen sliding scale  SubCutaneous at bedtime  valsartan 40milliGRAM(s) Oral two times a day  calcium acetate 667milliGRAM(s) Oral three times a day with meals  Nephrocaps 1Capsule(s) Oral daily  AQUAPHOR (petrolatum Ointment) 1Application(s) Topical two times a day  epoetin arian Injectable [id number](s) IV Push <User Schedule>  iron sucrose IVPB 100milliGRAM(s) IV Intermittent <User Schedule>      REVIEW OF SYSTEMS:  Vital Signs Last 24 Hrs  T(C): 36.8, Max: 36.8 (- @ 05:00)  T(F): 98.3, Max: 98.3 (-19 @ 05:00)  HR: 83 (83 - 94)  BP: 129/74 (129/74 - 149/78)  BP(mean): --  RR: 18 (16 - 18)  SpO2: 99% (96% - 99%)    PHYSICAL EXAM:  General: A/ox 3, No acute Distress  Neck: Supple, mod  JVD  Cardiac: S1 S2, No M/R/G  Pulmonary: CTAB, Breathing unlabored, No Rhonchi/Rales/Wheezing  Abdomen: Soft, Non -tender, +BS   Extremities: No Rashes, 2 +  edema of lower extremities   Neuro: A/o x 3, No focal deficits  Psch: normal mood , normal affect    LABS:  cret                        8.1    4.7   )-----------( 212      ( 2017 06:52 )             25.8     06-19    141  |  99  |  54<H>  ----------------------------<  125<H>  4.1   |  29  |  4.93<H>    Ca    8.2<L>      2017 06:52        Daily     Daily Weight in k.8 (2017 05:00)  I&O's Detail  I & Os for 24h ending 2017 07:00  =============================================  IN:    Oral Fluid: 740 ml    Total IN: 740 ml  ---------------------------------------------  OUT:    Total OUT: 0 ml  ---------------------------------------------  Total NET: 740 ml    I & Os for current day (as of 2017 10:43)  =============================================  IN:    Oral Fluid: 180 ml    Total IN: 180 ml  ---------------------------------------------  OUT:    Total OUT: 0 ml  ---------------------------------------------  Total NET: 180 ml Pt sitting on edge of bed off o2 with no complaints. He reports walking in halls with no SOB or chest pain, although he is using a walker. Pt remains with bilateral leg swelling and mild  to mod JVD greatly improved with less abdominal distention     PAST MEDICAL & SURGICAL HISTORY:  Acute on chronic systolic heart failure  Coronary artery disease  Foot ulcer due to secondary DM  Diabetes mellitus: type 2  AICD (automatic cardioverter/defibrillator) present  S/P CABG (coronary artery bypass graft)  Toe amputation status, left  Breast Reduction: at age 18      MEDICATIONS  (STANDING):  atorvastatin 40milliGRAM(s) Oral at bedtime  ferrous    sulfate 325milliGRAM(s) Oral two times a day with meals  carvedilol 3.125milliGRAM(s) Oral every 12 hours  dextrose 50% Injectable 50milliLiter(s) IV Push once  aspirin enteric coated 81milliGRAM(s) Oral daily  hydrALAZINE 100milliGRAM(s) Oral three times a day  isosorbide   dinitrate Tablet (ISORDIL) 20milliGRAM(s) Oral three times a day  insulin lispro (HumaLOG) corrective regimen sliding scale  SubCutaneous three times a day before meals  insulin lispro (HumaLOG) corrective regimen sliding scale  SubCutaneous at bedtime  valsartan 40milliGRAM(s) Oral two times a day  calcium acetate 667milliGRAM(s) Oral three times a day with meals  Nephrocaps 1Capsule(s) Oral daily  AQUAPHOR (petrolatum Ointment) 1Application(s) Topical two times a day  epoetin arian Injectable [id number](s) IV Push <User Schedule>  iron sucrose IVPB 100milliGRAM(s) IV Intermittent <User Schedule>      REVIEW OF SYSTEMS:  Vital Signs Last 24 Hrs  T(C): 36.8, Max: 36.8 (-19 @ 05:00)  T(F): 98.3, Max: 98.3 (-19 @ 05:00)  HR: 83 (83 - 94)  BP: 129/74 (129/74 - 149/78)  BP(mean): --  RR: 18 (16 - 18)  SpO2: 99% (96% - 99%)    PHYSICAL EXAM:  General: A/ox 3, No acute Distress  Neck: Supple, mod JVD  Cardiac: S1 S2, No M/R/G  Pulmonary: CTAB, Breathing unlabored, No Rhonchi/Rales/Wheezing  Abdomen: Soft, Non -tender, +BS   Extremities: No Rashes, 2 +  edema of lower extremities to thighs  Neuro: A/o x 3, No focal deficits  Psch: normal mood , normal affect    LABS:  cret                        8.1    4.7   )-----------( 212      ( 2017 06:52 )             25.8     06-19    141  |  99  |  54<H>  ----------------------------<  125<H>  4.1   |  29  |  4.93<H>    Ca    8.2<L>      2017 06:52        Daily     Daily Weight in k.8 (2017 05:00)  I&O's Detail  I & Os for 24h ending 2017 07:00  =============================================  IN:    Oral Fluid: 740 ml    Total IN: 740 ml  ---------------------------------------------  OUT:    Total OUT: 0 ml  ---------------------------------------------  Total NET: 740 ml    I & Os for current day (as of 2017 10:43)  =============================================  IN:    Oral Fluid: 180 ml    Total IN: 180 ml  ---------------------------------------------  OUT:    Total OUT: 0 ml  ---------------------------------------------  Total NET: 180 ml

## 2017-06-19 NOTE — PROGRESS NOTE ADULT - SUBJECTIVE AND OBJECTIVE BOX
Patient is a 47y old  Male who presents with a chief complaint of acute on chronic heart failure, acute renal failure (31 May 2017 09:48)      SUBJECTIVE / OVERNIGHT EVENTS: no new complaints  Review of Systems  chest pain no  palpitations no  sob no  nausea no  headache no    MEDICATIONS  (STANDING):  atorvastatin 40milliGRAM(s) Oral at bedtime  ferrous    sulfate 325milliGRAM(s) Oral two times a day with meals  dextrose 50% Injectable 12.5Gram(s) IV Push once  dextrose 50% Injectable 25Gram(s) IV Push once  dextrose 50% Injectable 25Gram(s) IV Push once  carvedilol 3.125milliGRAM(s) Oral every 12 hours  dextrose 50% Injectable 50milliLiter(s) IV Push once  aspirin enteric coated 81milliGRAM(s) Oral daily  hydrALAZINE 100milliGRAM(s) Oral three times a day  isosorbide   dinitrate Tablet (ISORDIL) 20milliGRAM(s) Oral three times a day  insulin lispro (HumaLOG) corrective regimen sliding scale  SubCutaneous three times a day before meals  insulin lispro (HumaLOG) corrective regimen sliding scale  SubCutaneous at bedtime  valsartan 40milliGRAM(s) Oral two times a day  calcium acetate 667milliGRAM(s) Oral three times a day with meals  Nephrocaps 1Capsule(s) Oral daily  AQUAPHOR (petrolatum Ointment) 1Application(s) Topical two times a day  epoetin arian Injectable 59465Iipn(s) IV Push <User Schedule>  iron sucrose IVPB 100milliGRAM(s) IV Intermittent <User Schedule>    MEDICATIONS  (PRN):  acetaminophen   Tablet 650milliGRAM(s) Oral every 6 hours PRN For Temp greater than 38.5 C (101.3 F)  acetaminophen   Tablet. 650milliGRAM(s) Oral every 6 hours PRN mild-moderate pain  dextrose Gel 1Dose(s) Oral once PRN Blood Glucose LESS THAN 70 milliGRAM(s)/deciliter  glucagon  Injectable 1milliGRAM(s) IntraMuscular once PRN Glucose LESS THAN 70 milligrams/deciliter      Vital Signs Last 24 Hrs  T(C): 37, Max: 37 (06-19 @ 18:00)  HR: 102 (83 - 102)  BP: 170/94 (129/74 - 170/94)  RR: 18 (16 - 18)  SpO2: 95% (94% - 99%)  Wt(kg): --  CAPILLARY BLOOD GLUCOSE  145 (19 Jun 2017 18:40)  197 (19 Jun 2017 17:43)  196 (19 Jun 2017 13:22)  128 (19 Jun 2017 08:48)  141 (18 Jun 2017 21:36)    I&O's Summary  I & Os for 24h ending 19 Jun 2017 07:00  =============================================  IN: 740 ml / OUT: 0 ml / NET: 740 ml    I & Os for current day (as of 19 Jun 2017 18:57)  =============================================  IN: 360 ml / OUT: 95 ml / NET: 265 ml      PHYSICAL EXAM:  GENERAL: NAD, well-developed  HEAD:  Atraumatic, Normocephalic  EYES: EOMI, PERRLA, conjunctiva and sclera clear  NECK: Supple, No JVD  CHEST/LUNG: Clear to auscultation bilaterally; No wheeze  HEART: Regular rate and rhythm; No murmurs, rubs, or gallops  ABDOMEN: Soft, Nontender, Nondistended; Bowel sounds present  EXTREMITIES:  2+ Peripheral Pulses, No clubbing, cyanosis, or edema  PSYCH: AAOx3  NEUROLOGY: non-focal  SKIN: No rashes or lesions    LABS:                        8.1    4.7   )-----------( 212      ( 19 Jun 2017 06:52 )             25.8     06-19    141  |  99  |  54<H>  ----------------------------<  125<H>  4.1   |  29  |  4.93<H>    Ca    8.2<L>      19 Jun 2017 06:52                RADIOLOGY & ADDITIONAL TESTS:    Imaging Personally Reviewed:    Consultant(s) Notes Reviewed:      Care Discussed with Consultants/Other Providers:

## 2017-06-19 NOTE — PROGRESS NOTE ADULT - ASSESSMENT
45yo male w/ PMH HFrEF (17% 4/26/17), CAD s/p CABG x4 2016, ICM s/p recent AICD 5/2017, DM2 c/b peripheral neuropathy w/ toe ulcers presents with 6 days of progressive swelling, orthopnea, dyspnea w GIN on CKD5 requiring initiation of HD and control of HTN  Discharge planning in progress with possible follow in [facility] .

## 2017-06-19 NOTE — PROGRESS NOTE ADULT - PROBLEM SELECTOR PLAN 1
We will continue daily UF per nephrology to achieve euvolemia.  Discontinue torsemide as patient is anuric We will continue daily UF per nephrology to achieve euvolemia.

## 2017-06-19 NOTE — PROGRESS NOTE ADULT - ASSESSMENT
47 year old man with ischemic cardiomyopathy in the context of poorly controlled hypertension and poorly controlled diabetes mellitus. He developed progressive acute on chronic stage V CKD leading to severe volume overload. He is hemodynamically stable, but continues to be volume overloaded. 47 year old man with ischemic cardiomyopathy in the context of poorly controlled hypertension and poorly controlled diabetes mellitus. He developed progressive acute on chronic stage V CKD leading to severe volume overload. He is hemodynamically stable, but continues to be volume overloaded. He is undergoing daily UF given ongoing massive volume overload. The goal will be to normalize his filling pressures which will give his heart the best opportunity to recover. When he is euvolemic, we will continue to uptitrate his neurohormonal antagonists.

## 2017-06-19 NOTE — PROGRESS NOTE ADULT - SUBJECTIVE AND OBJECTIVE BOX
Patient is a 47y Male  being evaluated for ESRD               feels better, no shortness of breath         PHYSICAL EXAM:  Vitals:  T(F): 98.3, Max: 98.3 (06-19 @ 05:00)  HR: 83  BP: 129/74  BP(mean): --  RR: 18  SpO2: 99%  Wt(kg): --  Constitutional: no acute distress  HEENT:  NC, ext ears nl, oropharynx clear,  nose nl  Neck: No JVD, bruit, adenopathy or thyromegaly  Eyes: PERRL, no discharge, no icterus  Respiratory: cta/p bilat, no wheezes, rales, nl effort  Cardiovascular: regular rate, S1 and S2 no rub or gallop  Abd: : BS+, soft, NT , no rebound or guarding, no bruits  Extremities: + edema or cyanosis, palpable pulses  Neurological: A/O x 3, nl coordination and tone    I and O's:    I & Os for current day (as of 06-19 @ 08:09)  =============================================  IN: 740 ml / OUT: 0 ml / NET: 740 ml          REVIEW OF SYSTEMS:  CONSTITUTIONAL: No weakness, fevers or chills  RESPIRATORY: No cough, wheezing, hemoptysis; No shortness of breath  CARDIOVASCULAR: No chest pain or palpitations  GI : no abd pains, nausea or vomiting  GENITOURINARY: No dysuria, frequency or hematuria  All other review of systems is negative unless indicated above.    Allergies    No Known Allergies    Intolerances        MEDICATIONS  (STANDING):  atorvastatin 40milliGRAM(s) Oral at bedtime  ferrous    sulfate 325milliGRAM(s) Oral two times a day with meals  dextrose 50% Injectable 12.5Gram(s) IV Push once  dextrose 50% Injectable 25Gram(s) IV Push once  dextrose 50% Injectable 25Gram(s) IV Push once  carvedilol 3.125milliGRAM(s) Oral every 12 hours  dextrose 50% Injectable 50milliLiter(s) IV Push once  aspirin enteric coated 81milliGRAM(s) Oral daily  hydrALAZINE 100milliGRAM(s) Oral three times a day  isosorbide   dinitrate Tablet (ISORDIL) 20milliGRAM(s) Oral three times a day  insulin lispro (HumaLOG) corrective regimen sliding scale  SubCutaneous three times a day before meals  insulin lispro (HumaLOG) corrective regimen sliding scale  SubCutaneous at bedtime  valsartan 40milliGRAM(s) Oral two times a day  calcium acetate 667milliGRAM(s) Oral three times a day with meals  Nephrocaps 1Capsule(s) Oral daily  AQUAPHOR (petrolatum Ointment) 1Application(s) Topical two times a day  epoetin arian Injectable [id number](s) IV Push <User Schedule>  iron sucrose IVPB 100milliGRAM(s) IV Intermittent <User Schedule>      LABS:  CBC Full  -  ( 19 Jun 2017 06:52 )  WBC Count : 4.7 K/uL  Hemoglobin : 8.1 g/dL  Hematocrit : 25.8 %  Platelet Count - Automated : 212 K/uL  Mean Cell Volume : 97.5 fl  Mean Cell Hemoglobin : 30.6 pg  Mean Cell Hemoglobin Concentration : 31.4 gm/dL  Auto Neutrophil # : x  Auto Lymphocyte # : x  Auto Monocyte # : x  Auto Eosinophil # : x  Auto Basophil # : x  Auto Neutrophil % : x  Auto Lymphocyte % : x  Auto Monocyte % : x  Auto Eosinophil % : x  Auto Basophil % : x    06-19    141  |  99  |  54<H>  ----------------------------<  125<H>  4.1   |  29  |  4.93<H>    Ca    8.2<L>      19 Jun 2017 06:52        Urine Studies:      Urine chemistry:   Urine Na:   Urine Creatinine:   Urine Protein/Cr ratio:  Urine K:   Urine Osm:   24 Hr urine studies:       Imp:  47y Male

## 2017-06-20 LAB
ANION GAP SERPL CALC-SCNC: 14 MMOL/L — SIGNIFICANT CHANGE UP (ref 5–17)
BUN SERPL-MCNC: 42 MG/DL — HIGH (ref 7–23)
CALCIUM SERPL-MCNC: 8.2 MG/DL — LOW (ref 8.4–10.5)
CHLORIDE SERPL-SCNC: 98 MMOL/L — SIGNIFICANT CHANGE UP (ref 96–108)
CO2 SERPL-SCNC: 27 MMOL/L — SIGNIFICANT CHANGE UP (ref 22–31)
CREAT SERPL-MCNC: 4 MG/DL — HIGH (ref 0.5–1.3)
GLUCOSE SERPL-MCNC: 120 MG/DL — HIGH (ref 70–99)
HCT VFR BLD CALC: 24.3 % — LOW (ref 39–50)
HGB BLD-MCNC: 8.1 G/DL — LOW (ref 13–17)
MCHC RBC-ENTMCNC: 32.2 PG — SIGNIFICANT CHANGE UP (ref 27–34)
MCHC RBC-ENTMCNC: 33.3 GM/DL — SIGNIFICANT CHANGE UP (ref 32–36)
MCV RBC AUTO: 96.6 FL — SIGNIFICANT CHANGE UP (ref 80–100)
PLATELET # BLD AUTO: 205 K/UL — SIGNIFICANT CHANGE UP (ref 150–400)
POTASSIUM SERPL-MCNC: 3.6 MMOL/L — SIGNIFICANT CHANGE UP (ref 3.5–5.3)
POTASSIUM SERPL-SCNC: 3.6 MMOL/L — SIGNIFICANT CHANGE UP (ref 3.5–5.3)
RBC # BLD: 2.51 M/UL — LOW (ref 4.2–5.8)
RBC # FLD: 16.4 % — HIGH (ref 10.3–14.5)
SODIUM SERPL-SCNC: 139 MMOL/L — SIGNIFICANT CHANGE UP (ref 135–145)
WBC # BLD: 4.5 K/UL — SIGNIFICANT CHANGE UP (ref 3.8–10.5)
WBC # FLD AUTO: 4.5 K/UL — SIGNIFICANT CHANGE UP (ref 3.8–10.5)

## 2017-06-20 RX ADMIN — Medication 100 MILLIGRAM(S): at 21:34

## 2017-06-20 RX ADMIN — CARVEDILOL PHOSPHATE 3.12 MILLIGRAM(S): 80 CAPSULE, EXTENDED RELEASE ORAL at 05:26

## 2017-06-20 RX ADMIN — ISOSORBIDE DINITRATE 20 MILLIGRAM(S): 5 TABLET ORAL at 14:00

## 2017-06-20 RX ADMIN — Medication 100 MILLIGRAM(S): at 05:26

## 2017-06-20 RX ADMIN — Medication 1 APPLICATION(S): at 21:33

## 2017-06-20 RX ADMIN — Medication 325 MILLIGRAM(S): at 08:56

## 2017-06-20 RX ADMIN — ISOSORBIDE DINITRATE 20 MILLIGRAM(S): 5 TABLET ORAL at 05:26

## 2017-06-20 RX ADMIN — VALSARTAN 40 MILLIGRAM(S): 80 TABLET ORAL at 21:34

## 2017-06-20 RX ADMIN — ATORVASTATIN CALCIUM 40 MILLIGRAM(S): 80 TABLET, FILM COATED ORAL at 21:36

## 2017-06-20 RX ADMIN — Medication 100 MILLIGRAM(S): at 14:00

## 2017-06-20 RX ADMIN — Medication 1 CAPSULE(S): at 14:01

## 2017-06-20 RX ADMIN — Medication 2: at 14:00

## 2017-06-20 RX ADMIN — Medication 1 APPLICATION(S): at 05:28

## 2017-06-20 RX ADMIN — Medication 81 MILLIGRAM(S): at 14:00

## 2017-06-20 RX ADMIN — VALSARTAN 40 MILLIGRAM(S): 80 TABLET ORAL at 05:26

## 2017-06-20 RX ADMIN — Medication 667 MILLIGRAM(S): at 14:00

## 2017-06-20 RX ADMIN — CARVEDILOL PHOSPHATE 3.12 MILLIGRAM(S): 80 CAPSULE, EXTENDED RELEASE ORAL at 21:34

## 2017-06-20 RX ADMIN — ISOSORBIDE DINITRATE 20 MILLIGRAM(S): 5 TABLET ORAL at 21:35

## 2017-06-20 RX ADMIN — Medication 667 MILLIGRAM(S): at 08:56

## 2017-06-20 NOTE — PROGRESS NOTE ADULT - SUBJECTIVE AND OBJECTIVE BOX
Patient is a 47y old  Male who presents with a chief complaint of acute on chronic heart failure, acute renal failure (31 May 2017 09:48)      SUBJECTIVE / OVERNIGHT EVENTS: no new complaints  Review of Systems  chest pain no  palpitations no  sob no  nausea no  headache no    MEDICATIONS  (STANDING):  atorvastatin 40milliGRAM(s) Oral at bedtime  ferrous    sulfate 325milliGRAM(s) Oral two times a day with meals  dextrose 50% Injectable 12.5Gram(s) IV Push once  dextrose 50% Injectable 25Gram(s) IV Push once  dextrose 50% Injectable 25Gram(s) IV Push once  carvedilol 3.125milliGRAM(s) Oral every 12 hours  dextrose 50% Injectable 50milliLiter(s) IV Push once  aspirin enteric coated 81milliGRAM(s) Oral daily  hydrALAZINE 100milliGRAM(s) Oral three times a day  isosorbide   dinitrate Tablet (ISORDIL) 20milliGRAM(s) Oral three times a day  insulin lispro (HumaLOG) corrective regimen sliding scale  SubCutaneous three times a day before meals  insulin lispro (HumaLOG) corrective regimen sliding scale  SubCutaneous at bedtime  valsartan 40milliGRAM(s) Oral two times a day  calcium acetate 667milliGRAM(s) Oral three times a day with meals  Nephrocaps 1Capsule(s) Oral daily  AQUAPHOR (petrolatum Ointment) 1Application(s) Topical two times a day  epoetin arian Injectable [id number](s) IV Push <User Schedule>  iron sucrose IVPB 100milliGRAM(s) IV Intermittent <User Schedule>    MEDICATIONS  (PRN):  acetaminophen   Tablet 650milliGRAM(s) Oral every 6 hours PRN For Temp greater than 38.5 C (101.3 F)  acetaminophen   Tablet. 650milliGRAM(s) Oral every 6 hours PRN mild-moderate pain  dextrose Gel 1Dose(s) Oral once PRN Blood Glucose LESS THAN 70 milliGRAM(s)/deciliter  glucagon  Injectable 1milliGRAM(s) IntraMuscular once PRN Glucose LESS THAN 70 milligrams/deciliter      Vital Signs Last 24 Hrs  T(C): 36.3, Max: 36.8 (06-20 @ 04:53)  HR: 96 (89 - 99)  BP: 165/89 (141/75 - 168/98)  RR: 18 (18 - 18)  SpO2: 95% (95% - 99%)  Wt(kg): --  CAPILLARY BLOOD GLUCOSE  136 (20 Jun 2017 18:10)  228 (20 Jun 2017 13:19)  128 (20 Jun 2017 08:55)  129 (19 Jun 2017 22:18)  145 (19 Jun 2017 18:40)    I&O's Summary  I & Os for 24h ending 20 Jun 2017 07:00  =============================================  IN: 360 ml / OUT: 3595 ml / NET: -3235 ml    I & Os for current day (as of 20 Jun 2017 18:38)  =============================================  IN: 760 ml / OUT: 0 ml / NET: 760 ml      PHYSICAL EXAM:  GENERAL: NAD, well-developed  HEAD:  Atraumatic, Normocephalic  EYES: EOMI, PERRLA, conjunctiva and sclera clear  NECK: Supple, No JVD  CHEST/LUNG: Clear to auscultation bilaterally; No wheeze  HEART: Regular rate and rhythm; No murmurs, rubs, or gallops  ABDOMEN: Soft, Nontender, Nondistended; Bowel sounds present  EXTREMITIES: 3+ edema  PSYCH: AAOx3  NEUROLOGY: non-focal  SKIN: No rashes or lesions    LABS:                        8.1    4.5   )-----------( 205      ( 20 Jun 2017 06:58 )             24.3     06-20    139  |  98  |  42<H>  ----------------------------<  120<H>  3.6   |  27  |  4.00<H>    Ca    8.2<L>      20 Jun 2017 06:58                RADIOLOGY & ADDITIONAL TESTS:    Imaging Personally Reviewed:    Consultant(s) Notes Reviewed:      Care Discussed with Consultants/Other Providers:

## 2017-06-20 NOTE — PROGRESS NOTE ADULT - ASSESSMENT
ESRD/ Hd  CAD/ cardiomyopathy  volume overload improving with increased HD/ UF  - will attempt UF again today  - usual HD tomorrow   - epogen with HD  - discharge plans with outpatient HD / no renal objection to discharge

## 2017-06-20 NOTE — PROGRESS NOTE ADULT - SUBJECTIVE AND OBJECTIVE BOX
Patient is a 47y Male  being evaluated for  ESRD  seated in chair, feels better  less swelling/ no SOB at rest        PHYSICAL EXAM:  Vitals:  T(F): 98.2, Max: 98.6 (06-19 @ 18:00)  HR: 89  BP: 141/75  BP(mean): --  RR: 18  SpO2: 99%  Wt(kg): --  Constitutional: no acute distress  HEENT:  NC, ext ears nl, oropharynx clear,  nose nl  Neck: No JVD, bruit, adenopathy or thyromegaly  Eyes: PERRL, no discharge, no icterus  Respiratory: decreased at bases, no wheezes, rales, nl effort  Cardiovascular: regular rate, S1 and S2 no rub or gallop  Abd: : BS+, soft, NT , no rebound or guarding, no bruits  Extremities:++ edema or cyanosis, palpable pulses, + thrill left arm AVF  Neurological: A/O x 3, nl coordination and tone    I and O's:    I & Os for current day (as of 06-20 @ 10:14)  =============================================  IN: 360 ml / OUT: 3595 ml / NET: -3235 ml          REVIEW OF SYSTEMS:  CONSTITUTIONAL: No weakness, fevers or chills  RESPIRATORY: No cough, wheezing, hemoptysis; No shortness of breath  CARDIOVASCULAR: No chest pain or palpitations  GI : no abd pains, nausea or vomiting  GENITOURINARY: No dysuria, frequency or hematuria  All other review of systems is negative unless indicated above.    Allergies    No Known Allergies    Intolerances        MEDICATIONS  (STANDING):  atorvastatin 40milliGRAM(s) Oral at bedtime  ferrous    sulfate 325milliGRAM(s) Oral two times a day with meals  dextrose 50% Injectable 12.5Gram(s) IV Push once  dextrose 50% Injectable 25Gram(s) IV Push once  dextrose 50% Injectable 25Gram(s) IV Push once  carvedilol 3.125milliGRAM(s) Oral every 12 hours  dextrose 50% Injectable 50milliLiter(s) IV Push once  aspirin enteric coated 81milliGRAM(s) Oral daily  hydrALAZINE 100milliGRAM(s) Oral three times a day  isosorbide   dinitrate Tablet (ISORDIL) 20milliGRAM(s) Oral three times a day  insulin lispro (HumaLOG) corrective regimen sliding scale  SubCutaneous three times a day before meals  insulin lispro (HumaLOG) corrective regimen sliding scale  SubCutaneous at bedtime  valsartan 40milliGRAM(s) Oral two times a day  calcium acetate 667milliGRAM(s) Oral three times a day with meals  Nephrocaps 1Capsule(s) Oral daily  AQUAPHOR (petrolatum Ointment) 1Application(s) Topical two times a day  epoetin arian Injectable 35756Lrhj(s) IV Push <User Schedule>  iron sucrose IVPB 100milliGRAM(s) IV Intermittent <User Schedule>      LABS:  CBC Full  -  ( 20 Jun 2017 06:58 )  WBC Count : 4.5 K/uL  Hemoglobin : 8.1 g/dL  Hematocrit : 24.3 %  Platelet Count - Automated : 205 K/uL  Mean Cell Volume : 96.6 fl  Mean Cell Hemoglobin : 32.2 pg  Mean Cell Hemoglobin Concentration : 33.3 gm/dL  Auto Neutrophil # : x  Auto Lymphocyte # : x  Auto Monocyte # : x  Auto Eosinophil # : x  Auto Basophil # : x  Auto Neutrophil % : x  Auto Lymphocyte % : x  Auto Monocyte % : x  Auto Eosinophil % : x  Auto Basophil % : x    06-20    139  |  98  |  42<H>  ----------------------------<  120<H>  3.6   |  27  |  4.00<H>    Ca    8.2<L>      20 Jun 2017 06:58        Urine Studies:      Urine chemistry:   Urine Na:   Urine Creatinine:   Urine Protein/Cr ratio:  Urine K:   Urine Osm:   24 Hr urine studies:       Imp:  47y Male

## 2017-06-20 NOTE — PROGRESS NOTE ADULT - ASSESSMENT
45yo male w/ PMH HFrEF (17% 4/26/17), CAD s/p CABG x4 2016, ICM s/p recent AICD 5/2017, DM2 c/b peripheral neuropathy w/ toe ulcers presents with 6 days of progressive swelling, orthopnea, dyspnea w GIN on CKD5 requiring initiation of HD and control of HTN  Discharge planning in progress with possible follow in [facility] .

## 2017-06-21 ENCOUNTER — APPOINTMENT (OUTPATIENT)
Dept: CARDIOLOGY | Facility: CLINIC | Age: 47
End: 2017-06-21

## 2017-06-21 LAB
ANION GAP SERPL CALC-SCNC: 15 MMOL/L — SIGNIFICANT CHANGE UP (ref 5–17)
BUN SERPL-MCNC: 61 MG/DL — HIGH (ref 7–23)
CALCIUM SERPL-MCNC: 8.4 MG/DL — SIGNIFICANT CHANGE UP (ref 8.4–10.5)
CHLORIDE SERPL-SCNC: 98 MMOL/L — SIGNIFICANT CHANGE UP (ref 96–108)
CO2 SERPL-SCNC: 25 MMOL/L — SIGNIFICANT CHANGE UP (ref 22–31)
CREAT SERPL-MCNC: 5.55 MG/DL — HIGH (ref 0.5–1.3)
GLUCOSE SERPL-MCNC: 126 MG/DL — HIGH (ref 70–99)
HCT VFR BLD CALC: 26.2 % — LOW (ref 39–50)
HGB BLD-MCNC: 8.6 G/DL — LOW (ref 13–17)
MCHC RBC-ENTMCNC: 31.6 PG — SIGNIFICANT CHANGE UP (ref 27–34)
MCHC RBC-ENTMCNC: 32.8 GM/DL — SIGNIFICANT CHANGE UP (ref 32–36)
MCV RBC AUTO: 96.3 FL — SIGNIFICANT CHANGE UP (ref 80–100)
PLATELET # BLD AUTO: 210 K/UL — SIGNIFICANT CHANGE UP (ref 150–400)
POTASSIUM SERPL-MCNC: 4.2 MMOL/L — SIGNIFICANT CHANGE UP (ref 3.5–5.3)
POTASSIUM SERPL-SCNC: 4.2 MMOL/L — SIGNIFICANT CHANGE UP (ref 3.5–5.3)
RBC # BLD: 2.72 M/UL — LOW (ref 4.2–5.8)
RBC # FLD: 16.3 % — HIGH (ref 10.3–14.5)
SODIUM SERPL-SCNC: 138 MMOL/L — SIGNIFICANT CHANGE UP (ref 135–145)
WBC # BLD: 4.6 K/UL — SIGNIFICANT CHANGE UP (ref 3.8–10.5)
WBC # FLD AUTO: 4.6 K/UL — SIGNIFICANT CHANGE UP (ref 3.8–10.5)

## 2017-06-21 RX ADMIN — Medication 325 MILLIGRAM(S): at 17:28

## 2017-06-21 RX ADMIN — ISOSORBIDE DINITRATE 20 MILLIGRAM(S): 5 TABLET ORAL at 05:27

## 2017-06-21 RX ADMIN — Medication 325 MILLIGRAM(S): at 08:06

## 2017-06-21 RX ADMIN — ISOSORBIDE DINITRATE 20 MILLIGRAM(S): 5 TABLET ORAL at 13:14

## 2017-06-21 RX ADMIN — Medication 667 MILLIGRAM(S): at 17:28

## 2017-06-21 RX ADMIN — Medication 100 MILLIGRAM(S): at 05:27

## 2017-06-21 RX ADMIN — Medication 2: at 13:13

## 2017-06-21 RX ADMIN — Medication 1: at 18:12

## 2017-06-21 RX ADMIN — VALSARTAN 40 MILLIGRAM(S): 80 TABLET ORAL at 17:28

## 2017-06-21 RX ADMIN — Medication 667 MILLIGRAM(S): at 13:15

## 2017-06-21 RX ADMIN — Medication 1 APPLICATION(S): at 05:27

## 2017-06-21 RX ADMIN — Medication 1 CAPSULE(S): at 13:14

## 2017-06-21 RX ADMIN — Medication 1 APPLICATION(S): at 17:22

## 2017-06-21 RX ADMIN — VALSARTAN 40 MILLIGRAM(S): 80 TABLET ORAL at 05:27

## 2017-06-21 RX ADMIN — Medication 81 MILLIGRAM(S): at 13:14

## 2017-06-21 RX ADMIN — Medication 100 MILLIGRAM(S): at 13:14

## 2017-06-21 RX ADMIN — CARVEDILOL PHOSPHATE 3.12 MILLIGRAM(S): 80 CAPSULE, EXTENDED RELEASE ORAL at 17:28

## 2017-06-21 RX ADMIN — Medication 667 MILLIGRAM(S): at 08:06

## 2017-06-21 RX ADMIN — IRON SUCROSE 210 MILLIGRAM(S): 20 INJECTION, SOLUTION INTRAVENOUS at 09:23

## 2017-06-21 RX ADMIN — ERYTHROPOIETIN 10000 UNIT(S): 10000 INJECTION, SOLUTION INTRAVENOUS; SUBCUTANEOUS at 09:22

## 2017-06-21 RX ADMIN — CARVEDILOL PHOSPHATE 3.12 MILLIGRAM(S): 80 CAPSULE, EXTENDED RELEASE ORAL at 05:27

## 2017-06-21 NOTE — PROGRESS NOTE ADULT - ASSESSMENT
ESRD/ Hd  CAD/ cardiomyopathy  volume overload improving with increased HD/ UF  - hd today  - epogen with HD  - discharge plans with outpatient HD / no renal objection to discharge

## 2017-06-21 NOTE — PROGRESS NOTE ADULT - SUBJECTIVE AND OBJECTIVE BOX
Patient is a 47y old  Male who presents with a chief complaint of acute on chronic heart failure, acute renal failure (31 May 2017 09:48)      SUBJECTIVE / OVERNIGHT EVENTS:  Review of Systems  chest pain no  palpitations no  sob no  nausea no  headache no    MEDICATIONS  (STANDING):  atorvastatin 40milliGRAM(s) Oral at bedtime  ferrous    sulfate 325milliGRAM(s) Oral two times a day with meals  dextrose 50% Injectable 12.5Gram(s) IV Push once  dextrose 50% Injectable 25Gram(s) IV Push once  dextrose 50% Injectable 25Gram(s) IV Push once  carvedilol 3.125milliGRAM(s) Oral every 12 hours  dextrose 50% Injectable 50milliLiter(s) IV Push once  aspirin enteric coated 81milliGRAM(s) Oral daily  hydrALAZINE 100milliGRAM(s) Oral three times a day  isosorbide   dinitrate Tablet (ISORDIL) 20milliGRAM(s) Oral three times a day  insulin lispro (HumaLOG) corrective regimen sliding scale  SubCutaneous three times a day before meals  insulin lispro (HumaLOG) corrective regimen sliding scale  SubCutaneous at bedtime  valsartan 40milliGRAM(s) Oral two times a day  calcium acetate 667milliGRAM(s) Oral three times a day with meals  Nephrocaps 1Capsule(s) Oral daily  AQUAPHOR (petrolatum Ointment) 1Application(s) Topical two times a day  epoetin arian Injectable 47164Pqgp(s) IV Push <User Schedule>  iron sucrose IVPB 100milliGRAM(s) IV Intermittent <User Schedule>    MEDICATIONS  (PRN):  acetaminophen   Tablet 650milliGRAM(s) Oral every 6 hours PRN For Temp greater than 38.5 C (101.3 F)  acetaminophen   Tablet. 650milliGRAM(s) Oral every 6 hours PRN mild-moderate pain  dextrose Gel 1Dose(s) Oral once PRN Blood Glucose LESS THAN 70 milliGRAM(s)/deciliter  glucagon  Injectable 1milliGRAM(s) IntraMuscular once PRN Glucose LESS THAN 70 milligrams/deciliter      Vital Signs Last 24 Hrs  T(C): 36.7, Max: 37.2 (06-20 @ 20:30)  HR: 97 (88 - 98)  BP: 147/79 (142/81 - 170/91)  RR: 18 (17 - 18)  SpO2: 98% (95% - 98%)  Wt(kg): --  CAPILLARY BLOOD GLUCOSE  164 (21 Jun 2017 18:13)  213 (21 Jun 2017 13:13)  139 (21 Jun 2017 08:30)  141 (20 Jun 2017 21:18)    I&O's Summary  I & Os for 24h ending 21 Jun 2017 07:00  =============================================  IN: 760 ml / OUT: 3000 ml / NET: -2240 ml    I & Os for current day (as of 21 Jun 2017 18:58)  =============================================  IN: 780 ml / OUT: 0 ml / NET: 780 ml      PHYSICAL EXAM:  GENERAL: NAD, well-developed  HEAD:  Atraumatic, Normocephalic  EYES: EOMI, PERRLA, conjunctiva and sclera clear  NECK: Supple, No JVD  CHEST/LUNG: Clear to auscultation bilaterally; No wheeze  HEART: Regular rate and rhythm; No murmurs, rubs, or gallops  ABDOMEN: Soft, Nontender, Nondistended; Bowel sounds present  EXTREMITIES:  2+ Peripheral Pulses, No clubbing, cyanosis, or edema AVF Left arm+ sutures  PSYCH: AAOx3  NEUROLOGY: non-focal  SKIN: No rashes or lesions    LABS:                        8.6    4.6   )-----------( 210      ( 21 Jun 2017 06:39 )             26.2     06-21    138  |  98  |  61<H>  ----------------------------<  126<H>  4.2   |  25  |  5.55<H>    Ca    8.4      21 Jun 2017 06:39                RADIOLOGY & ADDITIONAL TESTS:    Imaging Personally Reviewed:    Consultant(s) Notes Reviewed:      Care Discussed with Consultants/Other Providers: Patient is a 47y old  Male who presents with a chief complaint of acute on chronic heart failure, acute renal failure (31 May 2017 09:48)      SUBJECTIVE / OVERNIGHT EVENTS: feels weak  Review of Systems  chest pain no  palpitations no  sob no  nausea no  headache no    MEDICATIONS  (STANDING):  atorvastatin 40milliGRAM(s) Oral at bedtime  ferrous    sulfate 325milliGRAM(s) Oral two times a day with meals  dextrose 50% Injectable 12.5Gram(s) IV Push once  dextrose 50% Injectable 25Gram(s) IV Push once  dextrose 50% Injectable 25Gram(s) IV Push once  carvedilol 3.125milliGRAM(s) Oral every 12 hours  dextrose 50% Injectable 50milliLiter(s) IV Push once  aspirin enteric coated 81milliGRAM(s) Oral daily  hydrALAZINE 100milliGRAM(s) Oral three times a day  isosorbide   dinitrate Tablet (ISORDIL) 20milliGRAM(s) Oral three times a day  insulin lispro (HumaLOG) corrective regimen sliding scale  SubCutaneous three times a day before meals  insulin lispro (HumaLOG) corrective regimen sliding scale  SubCutaneous at bedtime  valsartan 40milliGRAM(s) Oral two times a day  calcium acetate 667milliGRAM(s) Oral three times a day with meals  Nephrocaps 1Capsule(s) Oral daily  AQUAPHOR (petrolatum Ointment) 1Application(s) Topical two times a day  epoetin arian Injectable 15614Rubo(s) IV Push <User Schedule>  iron sucrose IVPB 100milliGRAM(s) IV Intermittent <User Schedule>    MEDICATIONS  (PRN):  acetaminophen   Tablet 650milliGRAM(s) Oral every 6 hours PRN For Temp greater than 38.5 C (101.3 F)  acetaminophen   Tablet. 650milliGRAM(s) Oral every 6 hours PRN mild-moderate pain  dextrose Gel 1Dose(s) Oral once PRN Blood Glucose LESS THAN 70 milliGRAM(s)/deciliter  glucagon  Injectable 1milliGRAM(s) IntraMuscular once PRN Glucose LESS THAN 70 milligrams/deciliter      Vital Signs Last 24 Hrs  T(C): 36.7, Max: 37.2 (06-20 @ 20:30)  HR: 97 (88 - 98)  BP: 147/79 (142/81 - 170/91)  RR: 18 (17 - 18)  SpO2: 98% (95% - 98%)  Wt(kg): --  CAPILLARY BLOOD GLUCOSE  164 (21 Jun 2017 18:13)  213 (21 Jun 2017 13:13)  139 (21 Jun 2017 08:30)  141 (20 Jun 2017 21:18)    I&O's Summary  I & Os for 24h ending 21 Jun 2017 07:00  =============================================  IN: 760 ml / OUT: 3000 ml / NET: -2240 ml    I & Os for current day (as of 21 Jun 2017 18:58)  =============================================  IN: 780 ml / OUT: 0 ml / NET: 780 ml      PHYSICAL EXAM:  GENERAL: NAD, well-developed  HEAD:  Atraumatic, Normocephalic  EYES: EOMI, PERRLA, conjunctiva and sclera clear  NECK: Supple, No JVD  CHEST/LUNG: Clear to auscultation bilaterally; No wheeze  HEART: Regular rate and rhythm; No murmurs, rubs, or gallops  ABDOMEN: Soft, Nontender, Nondistended; Bowel sounds present  EXTREMITIES:  2+ Peripheral Pulses, No clubbing, cyanosis, or edema AVF Left arm+ sutures  PSYCH: AAOx3  NEUROLOGY: non-focal  SKIN: No rashes or lesions    LABS:                        8.6    4.6   )-----------( 210      ( 21 Jun 2017 06:39 )             26.2     06-21    138  |  98  |  61<H>  ----------------------------<  126<H>  4.2   |  25  |  5.55<H>    Ca    8.4      21 Jun 2017 06:39                RADIOLOGY & ADDITIONAL TESTS:    Imaging Personally Reviewed:    Consultant(s) Notes Reviewed:      Care Discussed with Consultants/Other Providers:

## 2017-06-21 NOTE — PROGRESS NOTE ADULT - SUBJECTIVE AND OBJECTIVE BOX
Patient is a 47y Male  being evaluated for  ESRD  notes reviewed  sleeping comfortably      PHYSICAL EXAM:  Vital Signs Last 24 Hrs  T(C): 36.8, Max: 37.2 (06-20 @ 20:30)  T(F): 98.2, Max: 99 (06-20 @ 20:30)  HR: 88 (88 - 98)  BP: 147/79 (142/81 - 168/98)  BP(mean): --  RR: 18 (17 - 18)  SpO2: 98% (95% - 98%)  Wt(kg): --  C  I and O's:    I & Os for current day (as of 06-20 @ 10:14)  =============================================  IN: 360 ml / OUT: 3595 ml / NET: -3235 ml          No Known Allergies    Intolerances        MEDICATIONS  (STANDING):  atorvastatin 40milliGRAM(s) Oral at bedtime  ferrous    sulfate 325milliGRAM(s) Oral two times a day with meals  dextrose 50% Injectable 12.5Gram(s) IV Push once  dextrose 50% Injectable 25Gram(s) IV Push once  dextrose 50% Injectable 25Gram(s) IV Push once  carvedilol 3.125milliGRAM(s) Oral every 12 hours  dextrose 50% Injectable 50milliLiter(s) IV Push once  aspirin enteric coated 81milliGRAM(s) Oral daily  hydrALAZINE 100milliGRAM(s) Oral three times a day  isosorbide   dinitrate Tablet (ISORDIL) 20milliGRAM(s) Oral three times a day  insulin lispro (HumaLOG) corrective regimen sliding scale  SubCutaneous three times a day before meals  insulin lispro (HumaLOG) corrective regimen sliding scale  SubCutaneous at bedtime  valsartan 40milliGRAM(s) Oral two times a day  calcium acetate 667milliGRAM(s) Oral three times a day with meals  Nephrocaps 1Capsule(s) Oral daily  AQUAPHOR (petrolatum Ointment) 1Application(s) Topical two times a day  epoetin arian Injectable 83126Kmom(s) IV Push <User Schedule>  iron sucrose IVPB 100milliGRAM(s) IV Intermittent <User Schedule>      LABS:  CBC Full  -  ( 20 Jun 2017 06:58 )  WBC Count : 4.5 K/uL  Hemoglobin : 8.1 g/dL  Hematocrit : 24.3 %  Platelet Count - Automated : 205 K/uL  Mean Cell Volume : 96.6 fl  Mean Cell Hemoglobin : 32.2 pg  Mean Cell Hemoglobin Concentration : 33.3 gm/dL  Auto Neutrophil # : x  Auto Lymphocyte # : x  Auto Monocyte # : x  Auto Eosinophil # : x  Auto Basophil # : x  Auto Neutrophil % : x  Auto Lymphocyte % : x  Auto Monocyte % : x  Auto Eosinophil % : x  Auto Basophil % : x    06-20    139  |  98  |  42<H>  ----------------------------<  120<H>  3.6   |  27  |  4.00<H>    Ca    8.2<L>      20 Jun 2017 06:58        Urine Studies:      Urine chemistry:   Urine Na:   Urine Creatinine:   Urine Protein/Cr ratio:  Urine K:   Urine Osm:   24 Hr urine studies:                         8.6    4.6   )-----------( 210      ( 21 Jun 2017 06:39 )             26.2       Imp:  47y Male

## 2017-06-22 DIAGNOSIS — F32.9 MAJOR DEPRESSIVE DISORDER, SINGLE EPISODE, UNSPECIFIED: ICD-10-CM

## 2017-06-22 LAB
ANION GAP SERPL CALC-SCNC: 10 MMOL/L — SIGNIFICANT CHANGE UP (ref 5–17)
BUN SERPL-MCNC: 38 MG/DL — HIGH (ref 7–23)
CALCIUM SERPL-MCNC: 8.6 MG/DL — SIGNIFICANT CHANGE UP (ref 8.4–10.5)
CHLORIDE SERPL-SCNC: 100 MMOL/L — SIGNIFICANT CHANGE UP (ref 96–108)
CO2 SERPL-SCNC: 30 MMOL/L — SIGNIFICANT CHANGE UP (ref 22–31)
CREAT SERPL-MCNC: 4.12 MG/DL — HIGH (ref 0.5–1.3)
GLUCOSE SERPL-MCNC: 103 MG/DL — HIGH (ref 70–99)
HCT VFR BLD CALC: 26.3 % — LOW (ref 39–50)
HGB BLD-MCNC: 8.9 G/DL — LOW (ref 13–17)
MCHC RBC-ENTMCNC: 32.5 PG — SIGNIFICANT CHANGE UP (ref 27–34)
MCHC RBC-ENTMCNC: 33.9 GM/DL — SIGNIFICANT CHANGE UP (ref 32–36)
MCV RBC AUTO: 95.9 FL — SIGNIFICANT CHANGE UP (ref 80–100)
PLATELET # BLD AUTO: 221 K/UL — SIGNIFICANT CHANGE UP (ref 150–400)
POTASSIUM SERPL-MCNC: 3.6 MMOL/L — SIGNIFICANT CHANGE UP (ref 3.5–5.3)
POTASSIUM SERPL-SCNC: 3.6 MMOL/L — SIGNIFICANT CHANGE UP (ref 3.5–5.3)
RBC # BLD: 2.74 M/UL — LOW (ref 4.2–5.8)
RBC # FLD: 16 % — HIGH (ref 10.3–14.5)
SODIUM SERPL-SCNC: 140 MMOL/L — SIGNIFICANT CHANGE UP (ref 135–145)
WBC # BLD: 4.4 K/UL — SIGNIFICANT CHANGE UP (ref 3.8–10.5)
WBC # FLD AUTO: 4.4 K/UL — SIGNIFICANT CHANGE UP (ref 3.8–10.5)

## 2017-06-22 PROCEDURE — 99233 SBSQ HOSP IP/OBS HIGH 50: CPT

## 2017-06-22 RX ORDER — VALSARTAN 80 MG/1
80 TABLET ORAL DAILY
Qty: 0 | Refills: 0 | Status: DISCONTINUED | OUTPATIENT
Start: 2017-06-22 | End: 2017-06-23

## 2017-06-22 RX ADMIN — ATORVASTATIN CALCIUM 40 MILLIGRAM(S): 80 TABLET, FILM COATED ORAL at 21:11

## 2017-06-22 RX ADMIN — ISOSORBIDE DINITRATE 20 MILLIGRAM(S): 5 TABLET ORAL at 17:06

## 2017-06-22 RX ADMIN — Medication 667 MILLIGRAM(S): at 09:50

## 2017-06-22 RX ADMIN — VALSARTAN 40 MILLIGRAM(S): 80 TABLET ORAL at 05:42

## 2017-06-22 RX ADMIN — Medication 325 MILLIGRAM(S): at 09:50

## 2017-06-22 RX ADMIN — Medication 1 CAPSULE(S): at 17:06

## 2017-06-22 RX ADMIN — Medication 100 MILLIGRAM(S): at 05:43

## 2017-06-22 RX ADMIN — ISOSORBIDE DINITRATE 20 MILLIGRAM(S): 5 TABLET ORAL at 05:43

## 2017-06-22 RX ADMIN — Medication 81 MILLIGRAM(S): at 17:06

## 2017-06-22 RX ADMIN — Medication 100 MILLIGRAM(S): at 17:06

## 2017-06-22 RX ADMIN — Medication 100 MILLIGRAM(S): at 00:57

## 2017-06-22 RX ADMIN — CARVEDILOL PHOSPHATE 3.12 MILLIGRAM(S): 80 CAPSULE, EXTENDED RELEASE ORAL at 05:43

## 2017-06-22 RX ADMIN — ISOSORBIDE DINITRATE 20 MILLIGRAM(S): 5 TABLET ORAL at 00:57

## 2017-06-22 RX ADMIN — Medication 1 APPLICATION(S): at 05:43

## 2017-06-22 RX ADMIN — CARVEDILOL PHOSPHATE 3.12 MILLIGRAM(S): 80 CAPSULE, EXTENDED RELEASE ORAL at 17:06

## 2017-06-22 RX ADMIN — Medication 325 MILLIGRAM(S): at 18:12

## 2017-06-22 RX ADMIN — Medication 100 MILLIGRAM(S): at 21:11

## 2017-06-22 RX ADMIN — ATORVASTATIN CALCIUM 40 MILLIGRAM(S): 80 TABLET, FILM COATED ORAL at 00:57

## 2017-06-22 RX ADMIN — Medication 667 MILLIGRAM(S): at 18:11

## 2017-06-22 RX ADMIN — Medication 1 APPLICATION(S): at 17:07

## 2017-06-22 NOTE — PROGRESS NOTE ADULT - SUBJECTIVE AND OBJECTIVE BOX
PROCEDURE NOTE:    Procedure: Suture removal    Risks: Minimal     Location: LUE (left forearm)    Indication: S/p AVF    Description: Confirmed appropriate patient/encounter. Pt agreeable to suture removal. Area prepped and draped with sterile technique. Tolerated procedure well. No blood loss. Area was clean, dry and intact. Area was cleansed and covered with Cavilon, Steri-Strips. AV fistula with good thrill.    Thank you for this consult PROCEDURE NOTE:    Procedure: Suture removal    Risks: Minimal     Location: LUE (left forearm)    Indication: S/p AVF    Description: Confirmed appropriate patient/encounter. Pt agreeable to suture removal. Area prepped and draped with sterile technique. Tolerated procedure well. No blood loss. Area was clean, dry and intact. Area was cleansed and covered with Cavilon, Steri-Strips. Left radial pulse palpable. AV fistula with good thrill.    Thank you for this consult PROCEDURE NOTE:    Procedure: Suture removal    Risks: Minimal     Location: LUE (left forearm)    Indication: S/p AVF    Description: Confirmed appropriate patient/encounter. Pt agreeable to suture removal. Area prepped and draped with sterile technique. Tolerated procedure well. No blood loss. Area was clean, dry and intact. Area was cleansed and covered with Cavilon, Steri-Strips. Left radial pulse palpable. AV fistula with good thrill.    Thank you for this consult        Addendum: Patient seen at bedside with PA student. Agree with above note and physical exam findings; in addition strong bruit auscultated.    Clarissa Zacarias PA-C

## 2017-06-22 NOTE — PROGRESS NOTE ADULT - ASSESSMENT
47 year old man with ischemic cardiomyopathy in the context of poorly controlled hypertension and poorly controlled diabetes mellitus. He developed progressive acute on chronic stage V CKD leading to severe volume overload. He is hemodynamically stable, but continues to be volume overloaded. He is undergoing daily UF given ongoing massive volume overload. The goal will be to normalize his filling pressures which will give his heart the best opportunity to recover. When he is euvolemic, we will continue to uptitrate his neurohormonal antagonists. 47 year old man with ischemic cardiomyopathy in the context of poorly controlled hypertension and poorly controlled diabetes mellitus. He developed progressive acute on chronic stage V CKD leading to severe volume overload. He is hemodynamically stable, but continues to be volume overloaded. He is undergoing daily UF given ongoing massive volume overload. The goal will be to normalize his filling pressures which will give his heart the best opportunity to recover. When he is euvolemic, we will continue to uptitrate his neurohormonal antagonists. Pt feeling better JVD and edema improved 47 year old man with ischemic cardiomyopathy in the context of poorly controlled hypertension and poorly controlled diabetes mellitus. He developed progressive acute on chronic stage V CKD leading to severe volume overload. He is hemodynamically stable, but continues to be volume overloaded. He is undergoing daily UF given ongoing massive volume overload. The goal will be to normalize his filling pressures which will give his heart the best opportunity to recover. When he is euvolemic, we will continue to uptitrate his neurohormonal antagonists. Pt feeling better JVD and edema improved  but remain HTN 47 year old man with ischemic cardiomyopathy in the context of poorly controlled hypertension and poorly controlled diabetes mellitus. He developed progressive acute on chronic stage V CKD leading to severe volume overload. He is hemodynamically stable, but continues to be volume overloaded. He is undergoing daily UF. When he is euvolemic, we will continue to uptitrate his neurohormonal antagonists.

## 2017-06-22 NOTE — PROGRESS NOTE ADULT - ATTENDING COMMENTS
Addendum: Patient seen at bedside with PA student. Agree with above note and physical exam findings; in addition strong bruit auscultated.    Clarissa Zacarias PA-C

## 2017-06-22 NOTE — PROGRESS NOTE ADULT - ASSESSMENT
47 M with ESRD  CHF  anemia  fluid overload slowly improving with daily HD  HD again today  cont procrit and Fe with HD  ambulate

## 2017-06-22 NOTE — PROGRESS NOTE ADULT - SUBJECTIVE AND OBJECTIVE BOX
PAST MEDICAL & SURGICAL HISTORY:  Acute on chronic systolic heart failure  Coronary artery disease  Foot ulcer due to secondary DM  Diabetes mellitus: type 2  AICD (automatic cardioverter/defibrillator) present  S/P CABG (coronary artery bypass graft)  Toe amputation status, left  Breast Reduction: at age 18      MEDICATIONS  (STANDING):  atorvastatin 40milliGRAM(s) Oral at bedtime  ferrous    sulfate 325milliGRAM(s) Oral two times a day with meals  dextrose 50% Injectable 12.5Gram(s) IV Push once  dextrose 50% Injectable 25Gram(s) IV Push once  dextrose 50% Injectable 25Gram(s) IV Push once  carvedilol 3.125milliGRAM(s) Oral every 12 hours  dextrose 50% Injectable 50milliLiter(s) IV Push once  aspirin enteric coated 81milliGRAM(s) Oral daily  hydrALAZINE 100milliGRAM(s) Oral three times a day  isosorbide   dinitrate Tablet (ISORDIL) 20milliGRAM(s) Oral three times a day  insulin lispro (HumaLOG) corrective regimen sliding scale  SubCutaneous three times a day before meals  insulin lispro (HumaLOG) corrective regimen sliding scale  SubCutaneous at bedtime  valsartan 40milliGRAM(s) Oral two times a day  calcium acetate 667milliGRAM(s) Oral three times a day with meals  Nephrocaps 1Capsule(s) Oral daily  AQUAPHOR (petrolatum Ointment) 1Application(s) Topical two times a day  epoetin arian Injectable 32052Lhlr(s) IV Push <User Schedule>  iron sucrose IVPB 100milliGRAM(s) IV Intermittent <User Schedule>    MEDICATIONS  (PRN):  acetaminophen   Tablet 650milliGRAM(s) Oral every 6 hours PRN For Temp greater than 38.5 C (101.3 F)  acetaminophen   Tablet. 650milliGRAM(s) Oral every 6 hours PRN mild-moderate pain  dextrose Gel 1Dose(s) Oral once PRN Blood Glucose LESS THAN 70 milliGRAM(s)/deciliter  glucagon  Injectable 1milliGRAM(s) IntraMuscular once PRN Glucose LESS THAN 70 milligrams/deciliter      REVIEW OF SYSTEMS:  Vital Signs Last 24 Hrs  T(C): 36.9, Max: 37 ( @ 12:31)  T(F): 98.5, Max: 98.6 ( @ 12:31)  HR: 96 (93 - 97)  BP: 150/80 (147/79 - 170/91)  BP(mean): --  RR: 18 (17 - 18)  SpO2: 94% (93% - 98%)    PHYSICAL EXAM:  General: A/ox 3, No acute Distress  Neck: Supple, NO JVD  Cardiac: S1 S2, No M/R/G  Pulmonary: CTAB, Breathing unlabored, No Rhonchi/Rales/Wheezing  Abdomen: Soft, Non -tender, +BS   Extremities: No Rashes, No edema  Neuro: A/o x 3, No focal deficits  Psch: normal mood , normal affect    LABS:  cret                        8.9    4.4   )-----------( 221      ( 2017 06:57 )             26.3     -    140  |  100  |  38<H>  ----------------------------<  103<H>  3.6   |  30  |  4.12<H>    Ca    8.6      2017 06:57        Daily Weight in k.2 (2017 04:31)  Wt6 =98  I&O's Detail    I & Os for current day (as of 2017 08:11)  =============================================  IN:    Oral Fluid: 1060 ml    Other: 800 ml    Total IN: 1860 ml  ---------------------------------------------  OUT:    Other: 3800 ml    Total OUT: 3800 ml  ---------------------------------------------  Total NET: -1940 ml Pt sitting in chair denies SOB pt feeling physically better but feels emotionally traumatized b/c he witnessed a cardiac arrest in dialysis     PAST MEDICAL & SURGICAL HISTORY:  Acute on chronic systolic heart failure  Coronary artery disease  Foot ulcer due to secondary DM  Diabetes mellitus: type 2  AICD (automatic cardioverter/defibrillator) present  S/P CABG (coronary artery bypass graft)  Toe amputation status, left  Breast Reduction: at age 18      MEDICATIONS  (STANDING):  atorvastatin 40milliGRAM(s) Oral at bedtime  ferrous    sulfate 325milliGRAM(s) Oral two times a day with meals  dextrose 50% Injectable 12.5Gram(s) IV Push once  dextrose 50% Injectable 25Gram(s) IV Push once  dextrose 50% Injectable 25Gram(s) IV Push once  carvedilol 3.125milliGRAM(s) Oral every 12 hours  dextrose 50% Injectable 50milliLiter(s) IV Push once  aspirin enteric coated 81milliGRAM(s) Oral daily  hydrALAZINE 100milliGRAM(s) Oral three times a day  isosorbide   dinitrate Tablet (ISORDIL) 20milliGRAM(s) Oral three times a day  insulin lispro (HumaLOG) corrective regimen sliding scale  SubCutaneous three times a day before meals  insulin lispro (HumaLOG) corrective regimen sliding scale  SubCutaneous at bedtime  valsartan 40milliGRAM(s) Oral two times a day  calcium acetate 667milliGRAM(s) Oral three times a day with meals  Nephrocaps 1Capsule(s) Oral daily  AQUAPHOR (petrolatum Ointment) 1Application(s) Topical two times a day  epoetin arian Injectable 86640Wquh(s) IV Push <User Schedule>  iron sucrose IVPB 100milliGRAM(s) IV Intermittent <User Schedule>    MEDICATIONS  (PRN):  acetaminophen   Tablet 650milliGRAM(s) Oral every 6 hours PRN For Temp greater than 38.5 C (101.3 F)  acetaminophen   Tablet. 650milliGRAM(s) Oral every 6 hours PRN mild-moderate pain  dextrose Gel 1Dose(s) Oral once PRN Blood Glucose LESS THAN 70 milliGRAM(s)/deciliter  glucagon  Injectable 1milliGRAM(s) IntraMuscular once PRN Glucose LESS THAN 70 milligrams/deciliter      REVIEW OF SYSTEMS:  Vital Signs Last 24 Hrs  T(C): 36.9, Max: 37 ( @ 12:31)  T(F): 98.5, Max: 98.6 ( @ 12:31)  HR: 96 (93 - 97)  BP: 150/80 (147/79 - 170/91)  BP(mean): --  RR: 18 (17 - 18)  SpO2: 94% (93% - 98%)    PHYSICAL EXAM:  General: A/ox 3, No acute Distress  Neck: Supple, mod JVD  Cardiac: S1 S2, No M/R/G  Pulmonary: CTAB, Breathing unlabored, No Rhonchi/Rales/Wheezing  Abdomen: Soft, Non -tender, +BS   Extremities: No Rashes1+  edema bilateral   Neuro: A/o x 3, No focal deficits  Psch: normal mood , normal affect    LABS:  cret                        8.9    4.4   )-----------( 221      ( 2017 06:57 )             26.3     06-22    140  |  100  |  38<H>  ----------------------------<  103<H>  3.6   |  30  |  4.12<H>    Ca    8.6      2017 06:57        Daily Weight in k.2 (2017 04:31)  Wt621 =98  I&O's Detail    I & Os for current day (as of 2017 08:11)  =============================================  IN:    Oral Fluid: 1060 ml    Other: 800 ml    Total IN: 1860 ml  ---------------------------------------------  OUT:    Other: 3800 ml    Total OUT: 3800 ml  ---------------------------------------------  Total NET: -1940 ml Pt sitting in chair denies SOB pt feeling physically better but feels emotionally traumatized b/c he witnessed a cardiac arrest in dialysis       MEDICATIONS  (STANDING):  atorvastatin 40milliGRAM(s) Oral at bedtime  ferrous    sulfate 325milliGRAM(s) Oral two times a day with meals  aspirin enteric coated 81milliGRAM(s) Oral daily  hydrALAZINE 100milliGRAM(s) Oral three times a day  isosorbide   dinitrate Tablet (ISORDIL) 20milliGRAM(s) Oral three times a day  insulin lispro (HumaLOG) corrective regimen sliding scale  SubCutaneous three times a day before meals  insulin lispro (HumaLOG) corrective regimen sliding scale  SubCutaneous at bedtime  valsartan 40milliGRAM(s) Oral two times a day  calcium acetate 667milliGRAM(s) Oral three times a day with meals  Nephrocaps 1Capsule(s) Oral daily  AQUAPHOR (petrolatum Ointment) 1Application(s) Topical two times a day  epoetin arian Injectable 44350Tzbh(s) IV Push <User Schedule>  iron sucrose IVPB 100milliGRAM(s) IV Intermittent <User Schedule>      Vital Signs Last 24 Hrs  T(C): 36.9, Max: 37 (-21 @ 12:31)  T(F): 98.5, Max: 98.6 (- @ 12:31)  HR: 96 (93 - 97)  BP: 150/80 (147/79 - 170/91)  RR: 18 (17 - 18)  SpO2: 94% (93% - 98%)    PHYSICAL EXAM:  General: A/ox 3, No acute Distress  Neck: Supple, mod JVD  Cardiac: S1 S2, No M/R/G  Pulmonary: CTAB, Breathing unlabored, No Rhonchi/Rales/Wheezing  Abdomen: Soft, Non -tender, +BS   Extremities: No Rashes1+  edema bilateral   Neuro: A/o x 3, No focal deficits  Psch: normal mood , normal affect    LABS:                       8.9    4.4   )-----------( 221      ( 2017 06:57 )             26.3     06-22    140  |  100  |  38<H>  ----------------------------<  103<H>  3.6   |  30  |  4.12<H>    Ca    8.6      2017 06:57      Daily Weight in k.2 (2017 04:31)  Wt6/21 =98  I&O's Detail    I & Os for current day (as of 2017 08:11)  =============================================  IN:    Oral Fluid: 1060 ml    Other: 800 ml    Total IN: 1860 ml  ---------------------------------------------  OUT:    Other: 3800 ml    Total OUT: 3800 ml  ---------------------------------------------  Total NET: -1940 ml

## 2017-06-22 NOTE — PROGRESS NOTE ADULT - ATTENDING COMMENTS
He remains significantly volume overloaded. Recommend continuing daily UF. When he is euvolemic, we will further augment vasodilators to promote LV recovery.

## 2017-06-22 NOTE — PROGRESS NOTE ADULT - SUBJECTIVE AND OBJECTIVE BOX
Patient is a 47y Male  being evaluated for    ESRD   who reports no complaints overnight.  no sob at rest      PHYSICAL EXAM:  Vitals:T(C): 36.9, Max: 37 (06-21 @ 12:31)  HR: 96 (93 - 97)  BP: 150/80 (147/79 - 170/91)  RR: 18 (17 - 18)  SpO2: 94% (93% - 98%)  Wt(kg): --    General: no acute distress  HEENT:  NC, ext ears nl, oropharynx clear,  nose nl  Neck: No JVD, bruit, adenopathy or thyromegaly  Eyes: PERRL, no discharge, no icterus  Respiratory: cta/p bilat, no wheezes, rales, nl effort  Cardiovascular: regular rate, S1 and S2 no rub or gallop  Abd: : BS+, soft, NT , no rebound or guarding, no bruits  Extremities: 2+ edema, no cyanosis, palpable pulses  AV fistula with good bruit and thrill  Neurological: A/O x 3, nl coordination and tone    I and O's:  I & Os for current day (as of 06-22 @ 09:28)  =============================================  IN: 1860 ml / OUT: 3800 ml / NET: -1940 ml            REVIEW OF SYSTEMS:  CONSTITUTIONAL: No weakness, fevers or chills  RESPIRATORY: No cough, wheezing, hemoptysis; No shortness of breath  CARDIOVASCULAR: No chest pain or palpitations  GI : no abd pain, nausea or vomiting  GENITOURINARY: No dysuria, frequency or hematuria  All other review of systems is negative unless indicated above.    Allergies    No Known Allergies    Intolerances          MEDICATIONS  (STANDING):  atorvastatin 40milliGRAM(s) Oral at bedtime  ferrous    sulfate 325milliGRAM(s) Oral two times a day with meals  dextrose 50% Injectable 12.5Gram(s) IV Push once  dextrose 50% Injectable 25Gram(s) IV Push once  dextrose 50% Injectable 25Gram(s) IV Push once  carvedilol 3.125milliGRAM(s) Oral every 12 hours  dextrose 50% Injectable 50milliLiter(s) IV Push once  aspirin enteric coated 81milliGRAM(s) Oral daily  hydrALAZINE 100milliGRAM(s) Oral three times a day  isosorbide   dinitrate Tablet (ISORDIL) 20milliGRAM(s) Oral three times a day  insulin lispro (HumaLOG) corrective regimen sliding scale  SubCutaneous three times a day before meals  insulin lispro (HumaLOG) corrective regimen sliding scale  SubCutaneous at bedtime  valsartan 40milliGRAM(s) Oral two times a day  calcium acetate 667milliGRAM(s) Oral three times a day with meals  Nephrocaps 1Capsule(s) Oral daily  AQUAPHOR (petrolatum Ointment) 1Application(s) Topical two times a day  epoetin arian Injectable 16858Cfry(s) IV Push <User Schedule>  iron sucrose IVPB 100milliGRAM(s) IV Intermittent <User Schedule>      Sodium,Serum 140    06-22 @ 06:57  Sodium,Serum 138    06-21 @ 06:39  Sodium,Serum 139    06-20 @ 06:58  Sodium,Serum 141    06-19 @ 06:52    Potassium,Serum 3.6    06-22 @ 06:57  Potassium,Serum 4.2    06-21 @ 06:39  Potassium,Serum 3.6    06-20 @ 06:58  Potassium,Serum 4.1    06-19 @ 06:52    Chloride,Serum 100    06-22 @ 06:57  Chloride,Serum 98    06-21 @ 06:39  Chloride,Serum 98    06-20 @ 06:58  Chloride,Serum 99    06-19 @ 06:52    CO2, Serum 30    06-22 @ 06:57  CO2, Serum 25    06-21 @ 06:39  CO2, Serum 27    06-20 @ 06:58  CO2, Serum 29    06-19 @ 06:52    BUN 38    06-22 @ 06:57  BUN 61    06-21 @ 06:39  BUN 42    06-20 @ 06:58  BUN 54    06-19 @ 06:52    Creatinine, Serum 4.12    06-22 @ 06:57  Creatinine, Serum 5.55    06-21 @ 06:39  Creatinine, Serum 4.00    06-20 @ 06:58  Creatinine, Serum 4.93    06-19 @ 06:52      06-22    140  |  100  |  38<H>  ----------------------------<  103<H>  3.6   |  30  |  4.12<H>    Ca    8.6      22 Jun 2017 06:57                              8.9    4.4   )-----------( 221      ( 22 Jun 2017 06:57 )             26.3

## 2017-06-22 NOTE — PROGRESS NOTE ADULT - SUBJECTIVE AND OBJECTIVE BOX
Patient is a 47y old  Male who presents with a chief complaint of acute on chronic heart failure, acute renal failure (31 May 2017 09:48)      SUBJECTIVE / OVERNIGHT EVENTS: comfortable  Review of Systems  chest pain no  palpitations no  sob no  nausea no  headache no    MEDICATIONS  (STANDING):  atorvastatin 40milliGRAM(s) Oral at bedtime  ferrous    sulfate 325milliGRAM(s) Oral two times a day with meals  dextrose 50% Injectable 12.5Gram(s) IV Push once  dextrose 50% Injectable 25Gram(s) IV Push once  dextrose 50% Injectable 25Gram(s) IV Push once  carvedilol 3.125milliGRAM(s) Oral every 12 hours  dextrose 50% Injectable 50milliLiter(s) IV Push once  aspirin enteric coated 81milliGRAM(s) Oral daily  hydrALAZINE 100milliGRAM(s) Oral three times a day  isosorbide   dinitrate Tablet (ISORDIL) 20milliGRAM(s) Oral three times a day  insulin lispro (HumaLOG) corrective regimen sliding scale  SubCutaneous three times a day before meals  insulin lispro (HumaLOG) corrective regimen sliding scale  SubCutaneous at bedtime  calcium acetate 667milliGRAM(s) Oral three times a day with meals  Nephrocaps 1Capsule(s) Oral daily  AQUAPHOR (petrolatum Ointment) 1Application(s) Topical two times a day  epoetin arian Injectable 71557Ofvx(s) IV Push <User Schedule>  iron sucrose IVPB 100milliGRAM(s) IV Intermittent <User Schedule>  valsartan 80milliGRAM(s) Oral daily    MEDICATIONS  (PRN):  acetaminophen   Tablet 650milliGRAM(s) Oral every 6 hours PRN For Temp greater than 38.5 C (101.3 F)  acetaminophen   Tablet. 650milliGRAM(s) Oral every 6 hours PRN mild-moderate pain  dextrose Gel 1Dose(s) Oral once PRN Blood Glucose LESS THAN 70 milliGRAM(s)/deciliter  glucagon  Injectable 1milliGRAM(s) IntraMuscular once PRN Glucose LESS THAN 70 milligrams/deciliter      Vital Signs Last 24 Hrs  T(C): 36.8, Max: 36.9 (06-22 @ 00:58)  HR: 98 (90 - 98)  BP: 143/77 (143/77 - 167/83)  RR: 18 (17 - 18)  SpO2: 93% (93% - 96%)  Wt(kg): --  CAPILLARY BLOOD GLUCOSE  141 (22 Jun 2017 18:06)  214 (22 Jun 2017 12:21)  116 (22 Jun 2017 08:50)  102 (22 Jun 2017 01:01)    I&O's Summary  I & Os for 24h ending 22 Jun 2017 07:00  =============================================  IN: 1860 ml / OUT: 3800 ml / NET: -1940 ml    I & Os for current day (as of 22 Jun 2017 19:22)  =============================================  IN: 540 ml / OUT: 3500 ml / NET: -2960 ml      PHYSICAL EXAM:  GENERAL: NAD, well-developed  HEAD:  Atraumatic, Normocephalic  EYES: EOMI, PERRLA, conjunctiva and sclera clear  NECK: Supple, No JVD  CHEST/LUNG: Clear to auscultation bilaterally; No wheeze  HEART: Regular rate and rhythm; No murmurs, rubs, or gallops  ABDOMEN: Soft, Nontender, Nondistended; Bowel sounds present  EXTREMITIES:  2+ Peripheral Pulses, No clubbing, cyanosis,3+edema bilateral but improving L arm AVF with thrill  PSYCH: AAOx3  NEUROLOGY: non-focal  SKIN: No rashes or lesions    LABS:                        8.9    4.4   )-----------( 221      ( 22 Jun 2017 06:57 )             26.3     06-22    140  |  100  |  38<H>  ----------------------------<  103<H>  3.6   |  30  |  4.12<H>    Ca    8.6      22 Jun 2017 06:57                RADIOLOGY & ADDITIONAL TESTS:    Imaging Personally Reviewed:    Consultant(s) Notes Reviewed:      Care Discussed with Consultants/Other Providers:

## 2017-06-22 NOTE — PROGRESS NOTE ADULT - PROBLEM SELECTOR PLAN 1
We will continue daily UF per nephrology to achieve euvolemia. We will continue daily UF per nephrology to achieve euvolemia.  reviewed with NP on floor increase Valsartan to 80 mg BID We will continue daily UF per nephrology to achieve euvolemia.  Increase Valsartan to 80 mg BID

## 2017-06-23 LAB
ANION GAP SERPL CALC-SCNC: 11 MMOL/L — SIGNIFICANT CHANGE UP (ref 5–17)
BUN SERPL-MCNC: 36 MG/DL — HIGH (ref 7–23)
CALCIUM SERPL-MCNC: 8.9 MG/DL — SIGNIFICANT CHANGE UP (ref 8.4–10.5)
CHLORIDE SERPL-SCNC: 97 MMOL/L — SIGNIFICANT CHANGE UP (ref 96–108)
CO2 SERPL-SCNC: 30 MMOL/L — SIGNIFICANT CHANGE UP (ref 22–31)
CREAT SERPL-MCNC: 4.14 MG/DL — HIGH (ref 0.5–1.3)
GLUCOSE SERPL-MCNC: 161 MG/DL — HIGH (ref 70–99)
HCT VFR BLD CALC: 29.8 % — LOW (ref 39–50)
HGB BLD-MCNC: 10 G/DL — LOW (ref 13–17)
MCHC RBC-ENTMCNC: 32.1 PG — SIGNIFICANT CHANGE UP (ref 27–34)
MCHC RBC-ENTMCNC: 33.4 GM/DL — SIGNIFICANT CHANGE UP (ref 32–36)
MCV RBC AUTO: 96.2 FL — SIGNIFICANT CHANGE UP (ref 80–100)
PLATELET # BLD AUTO: 242 K/UL — SIGNIFICANT CHANGE UP (ref 150–400)
POTASSIUM SERPL-MCNC: 4.2 MMOL/L — SIGNIFICANT CHANGE UP (ref 3.5–5.3)
POTASSIUM SERPL-SCNC: 4.2 MMOL/L — SIGNIFICANT CHANGE UP (ref 3.5–5.3)
RBC # BLD: 3.1 M/UL — LOW (ref 4.2–5.8)
RBC # FLD: 16.1 % — HIGH (ref 10.3–14.5)
SODIUM SERPL-SCNC: 138 MMOL/L — SIGNIFICANT CHANGE UP (ref 135–145)
WBC # BLD: 5.2 K/UL — SIGNIFICANT CHANGE UP (ref 3.8–10.5)
WBC # FLD AUTO: 5.2 K/UL — SIGNIFICANT CHANGE UP (ref 3.8–10.5)

## 2017-06-23 PROCEDURE — 99233 SBSQ HOSP IP/OBS HIGH 50: CPT

## 2017-06-23 RX ORDER — VALSARTAN 80 MG/1
80 TABLET ORAL
Qty: 0 | Refills: 0 | Status: DISCONTINUED | OUTPATIENT
Start: 2017-06-23 | End: 2017-06-26

## 2017-06-23 RX ORDER — ZOLPIDEM TARTRATE 10 MG/1
5 TABLET ORAL ONCE
Qty: 0 | Refills: 0 | Status: DISCONTINUED | OUTPATIENT
Start: 2017-06-23 | End: 2017-06-23

## 2017-06-23 RX ADMIN — Medication 1 APPLICATION(S): at 21:57

## 2017-06-23 RX ADMIN — Medication 325 MILLIGRAM(S): at 09:41

## 2017-06-23 RX ADMIN — CARVEDILOL PHOSPHATE 3.12 MILLIGRAM(S): 80 CAPSULE, EXTENDED RELEASE ORAL at 21:51

## 2017-06-23 RX ADMIN — Medication 100 MILLIGRAM(S): at 21:52

## 2017-06-23 RX ADMIN — Medication 667 MILLIGRAM(S): at 09:40

## 2017-06-23 RX ADMIN — Medication 1 APPLICATION(S): at 05:24

## 2017-06-23 RX ADMIN — VALSARTAN 80 MILLIGRAM(S): 80 TABLET ORAL at 05:24

## 2017-06-23 RX ADMIN — IRON SUCROSE 210 MILLIGRAM(S): 20 INJECTION, SOLUTION INTRAVENOUS at 17:26

## 2017-06-23 RX ADMIN — ZOLPIDEM TARTRATE 5 MILLIGRAM(S): 10 TABLET ORAL at 01:08

## 2017-06-23 RX ADMIN — ERYTHROPOIETIN 10000 UNIT(S): 10000 INJECTION, SOLUTION INTRAVENOUS; SUBCUTANEOUS at 17:25

## 2017-06-23 RX ADMIN — Medication 100 MILLIGRAM(S): at 05:24

## 2017-06-23 RX ADMIN — Medication 667 MILLIGRAM(S): at 13:34

## 2017-06-23 RX ADMIN — VALSARTAN 80 MILLIGRAM(S): 80 TABLET ORAL at 21:53

## 2017-06-23 RX ADMIN — Medication 2: at 13:32

## 2017-06-23 RX ADMIN — ISOSORBIDE DINITRATE 20 MILLIGRAM(S): 5 TABLET ORAL at 05:24

## 2017-06-23 RX ADMIN — Medication 100 MILLIGRAM(S): at 13:34

## 2017-06-23 RX ADMIN — CARVEDILOL PHOSPHATE 3.12 MILLIGRAM(S): 80 CAPSULE, EXTENDED RELEASE ORAL at 05:24

## 2017-06-23 RX ADMIN — ISOSORBIDE DINITRATE 20 MILLIGRAM(S): 5 TABLET ORAL at 00:07

## 2017-06-23 RX ADMIN — ATORVASTATIN CALCIUM 40 MILLIGRAM(S): 80 TABLET, FILM COATED ORAL at 21:50

## 2017-06-23 RX ADMIN — Medication 1 CAPSULE(S): at 13:33

## 2017-06-23 RX ADMIN — Medication 81 MILLIGRAM(S): at 13:34

## 2017-06-23 RX ADMIN — ISOSORBIDE DINITRATE 20 MILLIGRAM(S): 5 TABLET ORAL at 13:34

## 2017-06-23 RX ADMIN — ISOSORBIDE DINITRATE 20 MILLIGRAM(S): 5 TABLET ORAL at 21:52

## 2017-06-23 NOTE — PROGRESS NOTE ADULT - ASSESSMENT
47yo male w/ PMH HFrEF (17% 4/26/17), CAD s/p CABG x4 2016, ICM s/p recent AICD 5/2017, DM2 c/b peripheral neuropathy w/ toe ulcers presents with 6 days of progressive swelling, orthopnea, dyspnea w GIN on CKD5 requiring initiation of HD and control of HTN  Discharge planning in progress.

## 2017-06-23 NOTE — PROGRESS NOTE ADULT - SUBJECTIVE AND OBJECTIVE BOX
Patient is a 47y Male  being evaluated for    ESRD   who reports no complaints overnight.  no sob at rest      PHYSICAL EXAM:  Vital Signs Last 24 Hrs  T(C): 36.8, Max: 36.9 (06-22 @ 16:18)  T(F): 98.2, Max: 98.4 (06-22 @ 16:18)  HR: 85 (85 - 98)  BP: 146/75 (143/77 - 162/78)  BP(mean): --  RR: 18 (17 - 18)  SpO2: 96% (93% - 96%)  General: no acute distress  HEENT:  NC, ext ears nl, oropharynx clear,  nose nl  Neck: No JVD, bruit, adenopathy or thyromegaly  Eyes: PERRL, no discharge, no icterus  Respiratory: cta/p bilat, no wheezes, rales, nl effort  Cardiovascular: regular rate, S1 and S2 no rub or gallop  Abd: : BS+, soft, NT , no rebound or guarding, no bruits  Extremities: 2+ edema, no cyanosis, palpable pulses  AV fistula with good bruit and thrill  Neurological: A/O x 3, nl coordination and tone    I and O's:  I & Os for current day (as of 06-22 @ 09:28)  =============================================  IN: 1860 ml / OUT: 3800 ml / NET: -1940 ml            REVIEW OF SYSTEMS:  CONSTITUTIONAL: No weakness, fevers or chills  RESPIRATORY: No cough, wheezing, hemoptysis; No shortness of breath  CARDIOVASCULAR: No chest pain or palpitations  GI : no abd pain, nausea or vomiting  GENITOURINARY: No dysuria, frequency or hematuria  All other review of systems is negative unless indicated above.    Allergies    No Known Allergies    Intolerances          MEDICATIONS  (STANDING):  atorvastatin 40milliGRAM(s) Oral at bedtime  ferrous    sulfate 325milliGRAM(s) Oral two times a day with meals  dextrose 50% Injectable 12.5Gram(s) IV Push once  dextrose 50% Injectable 25Gram(s) IV Push once  dextrose 50% Injectable 25Gram(s) IV Push once  carvedilol 3.125milliGRAM(s) Oral every 12 hours  dextrose 50% Injectable 50milliLiter(s) IV Push once  aspirin enteric coated 81milliGRAM(s) Oral daily  hydrALAZINE 100milliGRAM(s) Oral three times a day  isosorbide   dinitrate Tablet (ISORDIL) 20milliGRAM(s) Oral three times a day  insulin lispro (HumaLOG) corrective regimen sliding scale  SubCutaneous three times a day before meals  insulin lispro (HumaLOG) corrective regimen sliding scale  SubCutaneous at bedtime  valsartan 40milliGRAM(s) Oral two times a day  calcium acetate 667milliGRAM(s) Oral three times a day with meals  Nephrocaps 1Capsule(s) Oral daily  AQUAPHOR (petrolatum Ointment) 1Application(s) Topical two times a day  epoetin arian Injectable 77596Midk(s) IV Push <User Schedule>  iron sucrose IVPB 100milliGRAM(s) IV Intermittent <User Schedule>      Sodium,Serum 140    06-22 @ 06:57  Sodium,Serum 138    06-21 @ 06:39  Sodium,Serum 139    06-20 @ 06:58  Sodium,Serum 141    06-19 @ 06:52    Potassium,Serum 3.6    06-22 @ 06:57  Potassium,Serum 4.2    06-21 @ 06:39  Potassium,Serum 3.6    06-20 @ 06:58  Potassium,Serum 4.1    06-19 @ 06:52    Chloride,Serum 100    06-22 @ 06:57  Chloride,Serum 98    06-21 @ 06:39  Chloride,Serum 98    06-20 @ 06:58  Chloride,Serum 99    06-19 @ 06:52    CO2, Serum 30    06-22 @ 06:57  CO2, Serum 25    06-21 @ 06:39  CO2, Serum 27    06-20 @ 06:58  CO2, Serum 29    06-19 @ 06:52    BUN 38    06-22 @ 06:57  BUN 61    06-21 @ 06:39  BUN 42    06-20 @ 06:58  BUN 54    06-19 @ 06:52    Creatinine, Serum 4.12    06-22 @ 06:57  Creatinine, Serum 5.55    06-21 @ 06:39  Creatinine, Serum 4.00    06-20 @ 06:58  Creatinine, Serum 4.93    06-19 @ 06:52      06-22    140  |  100  |  38<H>  ----------------------------<  103<H>  3.6   |  30  |  4.12<H>    Ca    8.6      22 Jun 2017 06:57                              8.9    4.4   )-----------( 221      ( 22 Jun 2017 06:57 )             26.3

## 2017-06-23 NOTE — PROGRESS NOTE ADULT - PROBLEM SELECTOR PLAN 3
reviewed with NP on floor need for emotional support   prn anxiety medications and sleep aids Reviewed with NP on floor need for emotional support   prn anxiety medications and sleep aids

## 2017-06-23 NOTE — PROGRESS NOTE ADULT - SUBJECTIVE AND OBJECTIVE BOX
PAST MEDICAL & SURGICAL HISTORY:  Acute on chronic systolic heart failure  Coronary artery disease  Foot ulcer due to secondary DM  Diabetes mellitus: type 2  AICD (automatic cardioverter/defibrillator) present  S/P CABG (coronary artery bypass graft)  Toe amputation status, left  Breast Reduction: at age 18      MEDICATIONS  (STANDING):  atorvastatin 40milliGRAM(s) Oral at bedtime  ferrous    sulfate 325milliGRAM(s) Oral two times a day with meals  dextrose 50% Injectable 12.5Gram(s) IV Push once  dextrose 50% Injectable 25Gram(s) IV Push once  dextrose 50% Injectable 25Gram(s) IV Push once  carvedilol 3.125milliGRAM(s) Oral every 12 hours  dextrose 50% Injectable 50milliLiter(s) IV Push once  aspirin enteric coated 81milliGRAM(s) Oral daily  hydrALAZINE 100milliGRAM(s) Oral three times a day  isosorbide   dinitrate Tablet (ISORDIL) 20milliGRAM(s) Oral three times a day  insulin lispro (HumaLOG) corrective regimen sliding scale  SubCutaneous three times a day before meals  insulin lispro (HumaLOG) corrective regimen sliding scale  SubCutaneous at bedtime  calcium acetate 667milliGRAM(s) Oral three times a day with meals  Nephrocaps 1Capsule(s) Oral daily  AQUAPHOR (petrolatum Ointment) 1Application(s) Topical two times a day  epoetin arian Injectable 65227Atpf(s) IV Push <User Schedule>  iron sucrose IVPB 100milliGRAM(s) IV Intermittent <User Schedule>  valsartan 80milliGRAM(s) Oral two times a day    MEDICATIONS  (PRN):  acetaminophen   Tablet 650milliGRAM(s) Oral every 6 hours PRN For Temp greater than 38.5 C (101.3 F)  acetaminophen   Tablet. 650milliGRAM(s) Oral every 6 hours PRN mild-moderate pain  dextrose Gel 1Dose(s) Oral once PRN Blood Glucose LESS THAN 70 milliGRAM(s)/deciliter  glucagon  Injectable 1milliGRAM(s) IntraMuscular once PRN Glucose LESS THAN 70 milligrams/deciliter      REVIEW OF SYSTEMS:  Vital Signs Last 24 Hrs  T(C): 36.8, Max: 36.9 ( @ 16:18)  T(F): 98.2, Max: 98.4 ( @ 16:18)  HR: 85 (85 - 98)  BP: 146/75 (143/77 - 162/78)  BP(mean): --  RR: 18 (17 - 18)  SpO2: 96% (93% - 96%)    PHYSICAL EXAM:  General: A/ox 3, No acute Distress  Neck: Supple, JVD  Cardiac: S1 S2, No M/R/G  Pulmonary: CTAB, Breathing unlabored, No Rhonchi/Rales/Wheezing  Abdomen: Soft, Non -tender, +BS   Extremities: No Rashes, edema  Neuro: A/o x 3, No focal deficits  Psch: normal mood , normal affect    LABS:  cret                4.12<H>    Ca    8.6      2017 06:57        Daily     Daily Weight in k.5 (2017 16:18)  I&O's Detail    I & Os for current day (as of 2017 07:19)  =============================================  IN:    Oral Fluid: 540 ml    Total IN: 540 ml  ---------------------------------------------  OUT:    Other: 3500 ml    Total OUT: 3500 ml  ---------------------------------------------  Total NET: -2960 ml Pt sitting in bed feeling better less emotional today with  mod JVD improving bilateral leg rosa improving with weight decreasing , Pt off O2 and walking  in halls     PAST MEDICAL & SURGICAL HISTORY:  Acute on chronic systolic heart failure  Coronary artery disease  Foot ulcer due to secondary DM  Diabetes mellitus: type 2  AICD (automatic cardioverter/defibrillator) present  S/P CABG (coronary artery bypass graft)  Toe amputation status, left  Breast Reduction: at age 18      MEDICATIONS  (STANDING):  atorvastatin 40milliGRAM(s) Oral at bedtime  ferrous    sulfate 325milliGRAM(s) Oral two times a day with meals  dextrose 50% Injectable 12.5Gram(s) IV Push once  dextrose 50% Injectable 25Gram(s) IV Push once  dextrose 50% Injectable 25Gram(s) IV Push once  carvedilol 3.125milliGRAM(s) Oral every 12 hours  dextrose 50% Injectable 50milliLiter(s) IV Push once  aspirin enteric coated 81milliGRAM(s) Oral daily  hydrALAZINE 100milliGRAM(s) Oral three times a day  isosorbide   dinitrate Tablet (ISORDIL) 20milliGRAM(s) Oral three times a day  insulin lispro (HumaLOG) corrective regimen sliding scale  SubCutaneous three times a day before meals  insulin lispro (HumaLOG) corrective regimen sliding scale  SubCutaneous at bedtime  calcium acetate 667milliGRAM(s) Oral three times a day with meals  Nephrocaps 1Capsule(s) Oral daily  AQUAPHOR (petrolatum Ointment) 1Application(s) Topical two times a day  epoetin arian Injectable 11701Asgr(s) IV Push <User Schedule>  iron sucrose IVPB 100milliGRAM(s) IV Intermittent <User Schedule>  valsartan 80milliGRAM(s) Oral two times a day      REVIEW OF SYSTEMS:  Vital Signs Last 24 Hrs  T(C): 36.8, Max: 36.9 (- @ 16:18)  T(F): 98.2, Max: 98.4 (- @ 16:18)  HR: 85 (85 - 98)  BP: 146/75 (143/77 - 162/78)  BP(mean): --  RR: 18 (17 - 18)  SpO2: 96% (93% - 96%)    PHYSICAL EXAM:  General: A/ox 3, No acute Distress  Neck: Supple,  mod JVD  Cardiac: S1 S2, No M/R/G  Pulmonary: CTAB, Breathing unlabored, No Rhonchi/Rales/Wheezing  Abdomen: Soft, Non -tender, +BS   Extremities: No Rashes,  bilateral leg +1 edema  Neuro: A/o x 3, No focal deficits  Psch: normal mood , normal affect    LABS:                             10.0   5.2   )-----------( 242      ( 2017 10:02 )             29.8         -    138  |  97  |  36<H>  ----------------------------<  161<H>  4.2   |  30  |  4.14<H>    Ca    8.9      2017 10:02  WT  = 93 .5  Daily Weight in k.5 (2017 16:18)  I&O's Detail  I & Os for current day (as of 2017 07:19)  =============================================  IN:    Oral Fluid: 540 ml    Total IN: 540 ml  ---------------------------------------------  OUT:    Other: 3500 ml    Total OUT: 3500 ml  ---------------------------------------------  Total NET: -2960 ml Pt sitting in bed feeling better less emotional today with improving bilateral leg edema. Pt off O2 and walking in halls     MEDICATIONS  (STANDING):  atorvastatin 40milliGRAM(s) Oral at bedtime  ferrous    sulfate 325milliGRAM(s) Oral two times a day with meals  carvedilol 3.125milliGRAM(s) Oral every 12 hours  aspirin enteric coated 81milliGRAM(s) Oral daily  hydrALAZINE 100milliGRAM(s) Oral three times a day  isosorbide   dinitrate Tablet (ISORDIL) 20milliGRAM(s) Oral three times a day  insulin lispro (HumaLOG) corrective regimen sliding scale  SubCutaneous three times a day before meals  insulin lispro (HumaLOG) corrective regimen sliding scale  SubCutaneous at bedtime  calcium acetate 667milliGRAM(s) Oral three times a day with meals  Nephrocaps 1Capsule(s) Oral daily  AQUAPHOR (petrolatum Ointment) 1Application(s) Topical two times a day  epoetin arian Injectable 73172Innq(s) IV Push <User Schedule>  iron sucrose IVPB 100milliGRAM(s) IV Intermittent <User Schedule>  valsartan 80milliGRAM(s) Oral two times a day    REVIEW OF SYSTEMS:  Vital Signs Last 24 Hrs  T(C): 36.8, Max: 36.9 (- @ 16:18)  T(F): 98.2, Max: 98.4 (- @ 16:18)  HR: 85 (85 - 98)  BP: 146/75 (143/77 - 162/78)  RR: 18 (17 - 18)  SpO2: 96% (93% - 96%)    PHYSICAL EXAM:  General: A/ox 3, No acute Distress  Neck: Supple,  mod JVD  Cardiac: S1 S2, No M/R/G  Pulmonary: CTAB, Breathing unlabored, No Rhonchi/Rales/Wheezing  Abdomen: Soft, Non -tender, +BS   Extremities: No Rashes,  bilateral leg +1 edema  Neuro: A/o x 3, No focal deficits  Psch: normal mood , normal affect    LABS:                             10.0   5.2   )-----------( 242      ( 2017 10:02 )             29.8         -    138  |  97  |  36<H>  ----------------------------<  161<H>  4.2   |  30  |  4.14<H>    Ca    8.9      2017 10:02  WT  = 93 .5  Daily Weight in k.5 (2017 16:18)  I&O's Detail  I & Os for current day (as of 2017 07:19)  =============================================  IN:    Oral Fluid: 540 ml    Total IN: 540 ml  ---------------------------------------------  OUT:    Other: 3500 ml    Total OUT: 3500 ml  ---------------------------------------------  Total NET: -2960 ml

## 2017-06-23 NOTE — PROGRESS NOTE ADULT - PROBLEM SELECTOR PLAN 1
We will continue daily UF per nephrology to achieve euvolemia. We will continue daily UF per nephrology to achieve euvolemia.  Valsartan increased to 80mg BID   Daily standing weights

## 2017-06-23 NOTE — PROGRESS NOTE ADULT - ASSESSMENT
47 year old man with ischemic cardiomyopathy in the context of poorly controlled hypertension and poorly controlled diabetes mellitus. He developed progressive acute on chronic stage V CKD leading to severe volume overload. He is hemodynamically stable, but continues to be volume overloaded. He is undergoing daily UF. When he is euvolemic, we will continue to uptitrate his neurohormonal antagonists. 47 year old man with ischemic cardiomyopathy in the context of poorly controlled hypertension and poorly controlled diabetes mellitus. He developed progressive acute on chronic stage V CKD leading to severe volume overload. He is hemodynamically stable, but continues to be volume overloaded. He is undergoing daily UF. When he is euvolemic, we will continue to uptitrate his neurohormonal antagonists. Pt progressing and doing well 47 year old man with ischemic cardiomyopathy in the context of poorly controlled hypertension and poorly controlled diabetes mellitus. He developed progressive acute on chronic stage V CKD leading to severe volume overload. He is hemodynamically stable, but continues to be volume overloaded, although significantly improved and approaching euvolemia. He remains hypertensive.

## 2017-06-23 NOTE — PROGRESS NOTE ADULT - SUBJECTIVE AND OBJECTIVE BOX
Patient is a 47y old  Male who presents with a chief complaint of acute on chronic heart failure, acute renal failure (31 May 2017 09:48)      SUBJECTIVE / OVERNIGHT EVENTS: comfortable, no  new complaints  Review of Systems  chest pain no  palpitations no  sob no  nausea no  headache no    MEDICATIONS  (STANDING):  atorvastatin 40milliGRAM(s) Oral at bedtime  ferrous    sulfate 325milliGRAM(s) Oral two times a day with meals  dextrose 50% Injectable 12.5Gram(s) IV Push once  dextrose 50% Injectable 25Gram(s) IV Push once  dextrose 50% Injectable 25Gram(s) IV Push once  carvedilol 3.125milliGRAM(s) Oral every 12 hours  dextrose 50% Injectable 50milliLiter(s) IV Push once  aspirin enteric coated 81milliGRAM(s) Oral daily  hydrALAZINE 100milliGRAM(s) Oral three times a day  isosorbide   dinitrate Tablet (ISORDIL) 20milliGRAM(s) Oral three times a day  insulin lispro (HumaLOG) corrective regimen sliding scale  SubCutaneous three times a day before meals  insulin lispro (HumaLOG) corrective regimen sliding scale  SubCutaneous at bedtime  calcium acetate 667milliGRAM(s) Oral three times a day with meals  Nephrocaps 1Capsule(s) Oral daily  AQUAPHOR (petrolatum Ointment) 1Application(s) Topical two times a day  epoetin arian Injectable 79783Qull(s) IV Push <User Schedule>  iron sucrose IVPB 100milliGRAM(s) IV Intermittent <User Schedule>  valsartan 80milliGRAM(s) Oral two times a day    MEDICATIONS  (PRN):  acetaminophen   Tablet 650milliGRAM(s) Oral every 6 hours PRN For Temp greater than 38.5 C (101.3 F)  acetaminophen   Tablet. 650milliGRAM(s) Oral every 6 hours PRN mild-moderate pain  dextrose Gel 1Dose(s) Oral once PRN Blood Glucose LESS THAN 70 milliGRAM(s)/deciliter  glucagon  Injectable 1milliGRAM(s) IntraMuscular once PRN Glucose LESS THAN 70 milligrams/deciliter      Vital Signs Last 24 Hrs  T(C): 36.7, Max: 36.9 (06-22 @ 21:02)  HR: 97 (85 - 99)  BP: 156/88 (143/77 - 165/87)  RR: 18 (16 - 18)  SpO2: 96% (95% - 96%)  Wt(kg): --  CAPILLARY BLOOD GLUCOSE  201 (23 Jun 2017 12:34)  132 (23 Jun 2017 09:14)  184 (22 Jun 2017 21:30)    I&O's Summary  I & Os for 24h ending 23 Jun 2017 07:00  =============================================  IN: 540 ml / OUT: 3500 ml / NET: -2960 ml    I & Os for current day (as of 23 Jun 2017 18:27)  =============================================  IN: 600 ml / OUT: 0 ml / NET: 600 ml      PHYSICAL EXAM:  GENERAL: NAD, well-developed  HEAD:  Atraumatic, Normocephalic  EYES: EOMI, PERRLA, conjunctiva and sclera clear  NECK: Supple, No JVD  CHEST/LUNG: Clear to auscultation bilaterally; No wheeze  HEART: Regular rate and rhythm; No murmurs, rubs, or gallops  ABDOMEN: Soft, Nontender, Nondistended; Bowel sounds present  EXTREMITIES:  2+ Peripheral Pulses, No clubbing, cyanosis,3+edema  PSYCH: AAOx3  NEUROLOGY: non-focal  SKIN: No rashes or lesions    LABS:                        10.0   5.2   )-----------( 242      ( 23 Jun 2017 10:02 )             29.8     06-23    138  |  97  |  36<H>  ----------------------------<  161<H>  4.2   |  30  |  4.14<H>    Ca    8.9      23 Jun 2017 10:02                RADIOLOGY & ADDITIONAL TESTS:    Imaging Personally Reviewed:    Consultant(s) Notes Reviewed:      Care Discussed with Consultants/Other Providers:

## 2017-06-23 NOTE — CHART NOTE - NSCHARTNOTEFT_GEN_A_CORE
Source: Patient [x ]    Family [ ]     other [x ]chart    Diet : CSTCHOSN/RENAL  Nepro x 2 daily  pt requesting Renal diet ed reinforcement         PO intake:  < 50% [ ] 50-75% [ x]   % [ ]  other :100% of Nepro     Source for PO intake [ x] Patient [ ] family [ ] chart [ ] staff [ ] other          Current Weight: 93.5pounds/206.1pounds  % Weight Change-19% loss since 5/29-some likely related to fluid    Pertinent Medications: MEDICATIONS  (STANDING):  atorvastatin 40milliGRAM(s) Oral at bedtime  ferrous    sulfate 325milliGRAM(s) Oral two times a day with meals  dextrose 50% Injectable 12.5Gram(s) IV Push once  dextrose 50% Injectable 25Gram(s) IV Push once  dextrose 50% Injectable 25Gram(s) IV Push once  carvedilol 3.125milliGRAM(s) Oral every 12 hours  dextrose 50% Injectable 50milliLiter(s) IV Push once  aspirin enteric coated 81milliGRAM(s) Oral daily  hydrALAZINE 100milliGRAM(s) Oral three times a day  isosorbide   dinitrate Tablet (ISORDIL) 20milliGRAM(s) Oral three times a day  insulin lispro (HumaLOG) corrective regimen sliding scale  SubCutaneous three times a day before meals  insulin lispro (HumaLOG) corrective regimen sliding scale  SubCutaneous at bedtime  calcium acetate 667milliGRAM(s) Oral three times a day with meals  Nephrocaps 1Capsule(s) Oral daily  AQUAPHOR (petrolatum Ointment) 1Application(s) Topical two times a day  epoetin arian Injectable 52050Bioa(s) IV Push <User Schedule>  iron sucrose IVPB 100milliGRAM(s) IV Intermittent <User Schedule>  valsartan 80milliGRAM(s) Oral two times a day    MEDICATIONS  (PRN):  acetaminophen   Tablet 650milliGRAM(s) Oral every 6 hours PRN For Temp greater than 38.5 C (101.3 F)  acetaminophen   Tablet. 650milliGRAM(s) Oral every 6 hours PRN mild-moderate pain  dextrose Gel 1Dose(s) Oral once PRN Blood Glucose LESS THAN 70 milliGRAM(s)/deciliter  glucagon  Injectable 1milliGRAM(s) IntraMuscular once PRN Glucose LESS THAN 70 milligrams/deciliter    Pertinent Labs:  06-23 Na138 mmol/L Glu 161 mg/dL<H> K+ 4.2 mmol/L Cr  4.14 mg/dL<H> BUN 36 mg/dL<H> Phos n/a   Alb n/a   PAB n/a         Skin: +2 left/right ankle, surgical incision    Estimated Needs:   [ x] no change since previous assessment         Previous Nutrition Diagnosis:    [x ] Food & Nutrition Related Knowledge Deficit          Nutrition Diagnosis is [x ] ongoing           New Nutrition Diagnosis: [x ] not applicable             Monitoring and Evaluation:     [x ] PO intake [x ] Tolerance to diet prescription [ x] weights

## 2017-06-24 LAB
ANION GAP SERPL CALC-SCNC: 13 MMOL/L — SIGNIFICANT CHANGE UP (ref 5–17)
BUN SERPL-MCNC: 29 MG/DL — HIGH (ref 7–23)
CALCIUM SERPL-MCNC: 8.8 MG/DL — SIGNIFICANT CHANGE UP (ref 8.4–10.5)
CHLORIDE SERPL-SCNC: 99 MMOL/L — SIGNIFICANT CHANGE UP (ref 96–108)
CO2 SERPL-SCNC: 28 MMOL/L — SIGNIFICANT CHANGE UP (ref 22–31)
CREAT SERPL-MCNC: 3.63 MG/DL — HIGH (ref 0.5–1.3)
GLUCOSE SERPL-MCNC: 127 MG/DL — HIGH (ref 70–99)
HCT VFR BLD CALC: 30.7 % — LOW (ref 39–50)
HGB BLD-MCNC: 9.5 G/DL — LOW (ref 13–17)
MCHC RBC-ENTMCNC: 30.2 PG — SIGNIFICANT CHANGE UP (ref 27–34)
MCHC RBC-ENTMCNC: 31 GM/DL — LOW (ref 32–36)
MCV RBC AUTO: 97.3 FL — SIGNIFICANT CHANGE UP (ref 80–100)
PLATELET # BLD AUTO: 255 K/UL — SIGNIFICANT CHANGE UP (ref 150–400)
POTASSIUM SERPL-MCNC: 4.1 MMOL/L — SIGNIFICANT CHANGE UP (ref 3.5–5.3)
POTASSIUM SERPL-SCNC: 4.1 MMOL/L — SIGNIFICANT CHANGE UP (ref 3.5–5.3)
RBC # BLD: 3.15 M/UL — LOW (ref 4.2–5.8)
RBC # FLD: 16.6 % — HIGH (ref 10.3–14.5)
SODIUM SERPL-SCNC: 140 MMOL/L — SIGNIFICANT CHANGE UP (ref 135–145)
WBC # BLD: 4.7 K/UL — SIGNIFICANT CHANGE UP (ref 3.8–10.5)
WBC # FLD AUTO: 4.7 K/UL — SIGNIFICANT CHANGE UP (ref 3.8–10.5)

## 2017-06-24 RX ADMIN — VALSARTAN 80 MILLIGRAM(S): 80 TABLET ORAL at 05:25

## 2017-06-24 RX ADMIN — Medication 1 CAPSULE(S): at 13:17

## 2017-06-24 RX ADMIN — Medication 100 MILLIGRAM(S): at 05:29

## 2017-06-24 RX ADMIN — ISOSORBIDE DINITRATE 20 MILLIGRAM(S): 5 TABLET ORAL at 05:26

## 2017-06-24 RX ADMIN — CARVEDILOL PHOSPHATE 3.12 MILLIGRAM(S): 80 CAPSULE, EXTENDED RELEASE ORAL at 21:06

## 2017-06-24 RX ADMIN — Medication 667 MILLIGRAM(S): at 09:41

## 2017-06-24 RX ADMIN — Medication 100 MILLIGRAM(S): at 17:39

## 2017-06-24 RX ADMIN — ISOSORBIDE DINITRATE 20 MILLIGRAM(S): 5 TABLET ORAL at 17:40

## 2017-06-24 RX ADMIN — Medication 1 APPLICATION(S): at 17:41

## 2017-06-24 RX ADMIN — ATORVASTATIN CALCIUM 40 MILLIGRAM(S): 80 TABLET, FILM COATED ORAL at 21:06

## 2017-06-24 RX ADMIN — ISOSORBIDE DINITRATE 20 MILLIGRAM(S): 5 TABLET ORAL at 21:06

## 2017-06-24 RX ADMIN — Medication 667 MILLIGRAM(S): at 13:16

## 2017-06-24 RX ADMIN — Medication 667 MILLIGRAM(S): at 17:42

## 2017-06-24 RX ADMIN — Medication 3: at 13:18

## 2017-06-24 RX ADMIN — Medication 1 APPLICATION(S): at 05:25

## 2017-06-24 RX ADMIN — Medication 325 MILLIGRAM(S): at 17:42

## 2017-06-24 RX ADMIN — Medication 100 MILLIGRAM(S): at 21:06

## 2017-06-24 RX ADMIN — VALSARTAN 80 MILLIGRAM(S): 80 TABLET ORAL at 21:06

## 2017-06-24 RX ADMIN — Medication 81 MILLIGRAM(S): at 17:39

## 2017-06-24 RX ADMIN — CARVEDILOL PHOSPHATE 3.12 MILLIGRAM(S): 80 CAPSULE, EXTENDED RELEASE ORAL at 05:25

## 2017-06-24 RX ADMIN — Medication 325 MILLIGRAM(S): at 09:42

## 2017-06-24 NOTE — PROGRESS NOTE ADULT - SUBJECTIVE AND OBJECTIVE BOX
Patient is a 47y Male  being evaluated for   ESRD/ Hd              who reports no complaints overnight.  swelling in legs better ,no SOB      PHYSICAL EXAM:  Vitals:  T(F): 98.1, Max: 99 (06-23 @ 21:57)  HR: 91  BP: 141/69  BP(mean): --  RR: 18  SpO2: 97%  Wt(kg): --  Constitutional: no acute distress  HEENT:  NC, ext ears nl, oropharynx clear,  nose nl  Neck: No JVD, bruit, adenopathy or thyromegaly  Eyes: PERRL, no discharge, no icterus  Respiratory: cta/p bilat, no wheezes, rales, nl effort  Cardiovascular: regular rate, S1 and S2 no rub or gallop  Abd: : BS+, soft, NT , no rebound or guarding, no bruits  Extremities: + edema or cyanosis, palpable pulses, + thill avf left arm   Neurological: A/O x 3, nl coordination and tone    I and O's:    I & Os for current day (as of 06-24 @ 07:00)  =============================================  IN: 1500 ml / OUT: 4400 ml / NET: -2900 ml          REVIEW OF SYSTEMS:  CONSTITUTIONAL: No weakness, fevers or chills  RESPIRATORY: No cough, wheezing, hemoptysis; No shortness of breath  CARDIOVASCULAR: No chest pain or palpitations  GI : no abd pains, nausea or vomiting  GENITOURINARY: No dysuria, frequency or hematuria  All other review of systems is negative unless indicated above.    Allergies    No Known Allergies    Intolerances        MEDICATIONS  (STANDING):  atorvastatin 40milliGRAM(s) Oral at bedtime  ferrous    sulfate 325milliGRAM(s) Oral two times a day with meals  dextrose 50% Injectable 12.5Gram(s) IV Push once  dextrose 50% Injectable 25Gram(s) IV Push once  dextrose 50% Injectable 25Gram(s) IV Push once  carvedilol 3.125milliGRAM(s) Oral every 12 hours  dextrose 50% Injectable 50milliLiter(s) IV Push once  aspirin enteric coated 81milliGRAM(s) Oral daily  hydrALAZINE 100milliGRAM(s) Oral three times a day  isosorbide   dinitrate Tablet (ISORDIL) 20milliGRAM(s) Oral three times a day  insulin lispro (HumaLOG) corrective regimen sliding scale  SubCutaneous three times a day before meals  insulin lispro (HumaLOG) corrective regimen sliding scale  SubCutaneous at bedtime  calcium acetate 667milliGRAM(s) Oral three times a day with meals  Nephrocaps 1Capsule(s) Oral daily  AQUAPHOR (petrolatum Ointment) 1Application(s) Topical two times a day  epoetin arian Injectable 91150Djal(s) IV Push <User Schedule>  iron sucrose IVPB 100milliGRAM(s) IV Intermittent <User Schedule>  valsartan 80milliGRAM(s) Oral two times a day      LABS:  CBC Full  -  ( 23 Jun 2017 10:02 )  WBC Count : 5.2 K/uL  Hemoglobin : 10.0 g/dL  Hematocrit : 29.8 %  Platelet Count - Automated : 242 K/uL  Mean Cell Volume : 96.2 fl  Mean Cell Hemoglobin : 32.1 pg  Mean Cell Hemoglobin Concentration : 33.4 gm/dL  Auto Neutrophil # : x  Auto Lymphocyte # : x  Auto Monocyte # : x  Auto Eosinophil # : x  Auto Basophil # : x  Auto Neutrophil % : x  Auto Lymphocyte % : x  Auto Monocyte % : x  Auto Eosinophil % : x  Auto Basophil % : x    06-23    138  |  97  |  36<H>  ----------------------------<  161<H>  4.2   |  30  |  4.14<H>    Ca    8.9      23 Jun 2017 10:02        Urine Studies:      Urine chemistry:   Urine Na:   Urine Creatinine:   Urine Protein/Cr ratio:  Urine K:   Urine Osm:   24 Hr urine studies:       Imp:  47y Male

## 2017-06-24 NOTE — PROGRESS NOTE ADULT - SUBJECTIVE AND OBJECTIVE BOX
Patient is a 47y old  Male who presents with a chief complaint of acute on chronic heart failure, acute renal failure (31 May 2017 09:48)      SUBJECTIVE / OVERNIGHT EVENTS: comfortable  Review of Systems  chest pain no  palpitations no  sob no  nausea no  headache no    MEDICATIONS  (STANDING):  atorvastatin 40milliGRAM(s) Oral at bedtime  ferrous    sulfate 325milliGRAM(s) Oral two times a day with meals  dextrose 50% Injectable 12.5Gram(s) IV Push once  dextrose 50% Injectable 25Gram(s) IV Push once  dextrose 50% Injectable 25Gram(s) IV Push once  carvedilol 3.125milliGRAM(s) Oral every 12 hours  dextrose 50% Injectable 50milliLiter(s) IV Push once  aspirin enteric coated 81milliGRAM(s) Oral daily  hydrALAZINE 100milliGRAM(s) Oral three times a day  isosorbide   dinitrate Tablet (ISORDIL) 20milliGRAM(s) Oral three times a day  insulin lispro (HumaLOG) corrective regimen sliding scale  SubCutaneous three times a day before meals  insulin lispro (HumaLOG) corrective regimen sliding scale  SubCutaneous at bedtime  calcium acetate 667milliGRAM(s) Oral three times a day with meals  Nephrocaps 1Capsule(s) Oral daily  AQUAPHOR (petrolatum Ointment) 1Application(s) Topical two times a day  epoetin arian Injectable 15559Yfnz(s) IV Push <User Schedule>  iron sucrose IVPB 100milliGRAM(s) IV Intermittent <User Schedule>  valsartan 80milliGRAM(s) Oral two times a day    MEDICATIONS  (PRN):  acetaminophen   Tablet 650milliGRAM(s) Oral every 6 hours PRN For Temp greater than 38.5 C (101.3 F)  acetaminophen   Tablet. 650milliGRAM(s) Oral every 6 hours PRN mild-moderate pain  dextrose Gel 1Dose(s) Oral once PRN Blood Glucose LESS THAN 70 milliGRAM(s)/deciliter  glucagon  Injectable 1milliGRAM(s) IntraMuscular once PRN Glucose LESS THAN 70 milligrams/deciliter      Vital Signs Last 24 Hrs  T(C): 36.8, Max: 37.2 (06-23 @ 21:57)  HR: 98 (91 - 109)  BP: 157/89 (141/69 - 163/93)  RR: 16 (16 - 18)  SpO2: 97% (93% - 97%)  Wt(kg): --  CAPILLARY BLOOD GLUCOSE  253 (24 Jun 2017 12:38)  145 (24 Jun 2017 08:40)  206 (23 Jun 2017 21:57)  135 (23 Jun 2017 18:45)    I&O's Summary  I & Os for 24h ending 24 Jun 2017 07:00  =============================================  IN: 1500 ml / OUT: 4400 ml / NET: -2900 ml    I & Os for current day (as of 24 Jun 2017 17:33)  =============================================  IN: 360 ml / OUT: 3500 ml / NET: -3140 ml      PHYSICAL EXAM:  GENERAL: NAD, well-developed  HEAD:  Atraumatic, Normocephalic  EYES: EOMI, PERRLA, conjunctiva and sclera clear  NECK: Supple, No JVD  CHEST/LUNG: Clear to auscultation bilaterally; No wheeze  HEART: Regular rate and rhythm; No murmurs, rubs, or gallops  ABDOMEN: Soft, Nontender, Nondistended; Bowel sounds present   EXTREMITIES:  2+ Peripheral Pulses, No clubbing, cyanosis, 3+edema  PSYCH: AAOx3  NEUROLOGY: non-focal  SKIN: No rashes or lesions    LABS:                        9.5    4.7   )-----------( 255      ( 24 Jun 2017 06:50 )             30.7     06-24    140  |  99  |  29<H>  ----------------------------<  127<H>  4.1   |  28  |  3.63<H>    Ca    8.8      24 Jun 2017 06:50                RADIOLOGY & ADDITIONAL TESTS:    Imaging Personally Reviewed:    Consultant(s) Notes Reviewed:      Care Discussed with Consultants/Other Providers:

## 2017-06-24 NOTE — PROGRESS NOTE ADULT - ASSESSMENT
ESRD/ Hd  CAD/ cardiomyopathy  volume overload improving with increased HD/ UF  -will attempt UF today   - Hd again on monday   - epogen with HD  - should be ready for discharge from renal perspective early next week     Electronic Signatures:

## 2017-06-25 LAB
ANION GAP SERPL CALC-SCNC: 14 MMOL/L — SIGNIFICANT CHANGE UP (ref 5–17)
BUN SERPL-MCNC: 53 MG/DL — HIGH (ref 7–23)
CALCIUM SERPL-MCNC: 9 MG/DL — SIGNIFICANT CHANGE UP (ref 8.4–10.5)
CHLORIDE SERPL-SCNC: 98 MMOL/L — SIGNIFICANT CHANGE UP (ref 96–108)
CO2 SERPL-SCNC: 26 MMOL/L — SIGNIFICANT CHANGE UP (ref 22–31)
CREAT SERPL-MCNC: 5.41 MG/DL — HIGH (ref 0.5–1.3)
GLUCOSE SERPL-MCNC: 126 MG/DL — HIGH (ref 70–99)
HCT VFR BLD CALC: 28.6 % — LOW (ref 39–50)
HGB BLD-MCNC: 9.6 G/DL — LOW (ref 13–17)
MAGNESIUM SERPL-MCNC: 2.4 MG/DL — SIGNIFICANT CHANGE UP (ref 1.6–2.6)
MCHC RBC-ENTMCNC: 32.5 PG — SIGNIFICANT CHANGE UP (ref 27–34)
MCHC RBC-ENTMCNC: 33.5 GM/DL — SIGNIFICANT CHANGE UP (ref 32–36)
MCV RBC AUTO: 97.2 FL — SIGNIFICANT CHANGE UP (ref 80–100)
PHOSPHATE SERPL-MCNC: 1.8 MG/DL — LOW (ref 2.5–4.5)
PLATELET # BLD AUTO: 226 K/UL — SIGNIFICANT CHANGE UP (ref 150–400)
POTASSIUM SERPL-MCNC: 4.1 MMOL/L — SIGNIFICANT CHANGE UP (ref 3.5–5.3)
POTASSIUM SERPL-SCNC: 4.1 MMOL/L — SIGNIFICANT CHANGE UP (ref 3.5–5.3)
RBC # BLD: 2.95 M/UL — LOW (ref 4.2–5.8)
RBC # FLD: 16.2 % — HIGH (ref 10.3–14.5)
SODIUM SERPL-SCNC: 138 MMOL/L — SIGNIFICANT CHANGE UP (ref 135–145)
WBC # BLD: 5.7 K/UL — SIGNIFICANT CHANGE UP (ref 3.8–10.5)
WBC # FLD AUTO: 5.7 K/UL — SIGNIFICANT CHANGE UP (ref 3.8–10.5)

## 2017-06-25 PROCEDURE — 99233 SBSQ HOSP IP/OBS HIGH 50: CPT

## 2017-06-25 RX ORDER — ZOLPIDEM TARTRATE 10 MG/1
5 TABLET ORAL ONCE
Qty: 0 | Refills: 0 | Status: DISCONTINUED | OUTPATIENT
Start: 2017-06-25 | End: 2017-06-25

## 2017-06-25 RX ADMIN — Medication 1 CAPSULE(S): at 13:33

## 2017-06-25 RX ADMIN — ISOSORBIDE DINITRATE 20 MILLIGRAM(S): 5 TABLET ORAL at 13:32

## 2017-06-25 RX ADMIN — ISOSORBIDE DINITRATE 20 MILLIGRAM(S): 5 TABLET ORAL at 21:09

## 2017-06-25 RX ADMIN — Medication 2: at 13:33

## 2017-06-25 RX ADMIN — Medication 81 MILLIGRAM(S): at 13:31

## 2017-06-25 RX ADMIN — Medication 667 MILLIGRAM(S): at 13:31

## 2017-06-25 RX ADMIN — ATORVASTATIN CALCIUM 40 MILLIGRAM(S): 80 TABLET, FILM COATED ORAL at 21:09

## 2017-06-25 RX ADMIN — Medication 667 MILLIGRAM(S): at 10:01

## 2017-06-25 RX ADMIN — VALSARTAN 80 MILLIGRAM(S): 80 TABLET ORAL at 06:35

## 2017-06-25 RX ADMIN — Medication 1 APPLICATION(S): at 18:34

## 2017-06-25 RX ADMIN — Medication 100 MILLIGRAM(S): at 21:09

## 2017-06-25 RX ADMIN — Medication 100 MILLIGRAM(S): at 06:35

## 2017-06-25 RX ADMIN — Medication 667 MILLIGRAM(S): at 18:35

## 2017-06-25 RX ADMIN — Medication 325 MILLIGRAM(S): at 18:35

## 2017-06-25 RX ADMIN — ISOSORBIDE DINITRATE 20 MILLIGRAM(S): 5 TABLET ORAL at 06:35

## 2017-06-25 RX ADMIN — Medication 100 MILLIGRAM(S): at 13:32

## 2017-06-25 RX ADMIN — ZOLPIDEM TARTRATE 5 MILLIGRAM(S): 10 TABLET ORAL at 01:08

## 2017-06-25 RX ADMIN — VALSARTAN 80 MILLIGRAM(S): 80 TABLET ORAL at 18:37

## 2017-06-25 RX ADMIN — CARVEDILOL PHOSPHATE 3.12 MILLIGRAM(S): 80 CAPSULE, EXTENDED RELEASE ORAL at 18:35

## 2017-06-25 RX ADMIN — Medication 1 APPLICATION(S): at 06:35

## 2017-06-25 RX ADMIN — CARVEDILOL PHOSPHATE 3.12 MILLIGRAM(S): 80 CAPSULE, EXTENDED RELEASE ORAL at 06:35

## 2017-06-25 RX ADMIN — Medication 325 MILLIGRAM(S): at 10:02

## 2017-06-25 NOTE — PROGRESS NOTE ADULT - ASSESSMENT
47 year old man with ischemic cardiomyopathy in the context of poorly controlled hypertension and poorly controlled diabetes mellitus. He developed progressive acute on chronic stage V CKD leading to severe volume overload. He is hemodynamically stable, but continues to be volume overloaded, although significantly improved and approaching euvolemia. He remains hypertensive.

## 2017-06-25 NOTE — PROGRESS NOTE ADULT - SUBJECTIVE AND OBJECTIVE BOX
Patient is a 47y old  Male who presents with a chief complaint of acute on chronic heart failure, acute renal failure (31 May 2017 09:48)      SUBJECTIVE / OVERNIGHT EVENTS: comfortable  Review of Systems  chest pain no  palpitations no  sob no  nausea no  headache no    MEDICATIONS  (STANDING):  atorvastatin 40milliGRAM(s) Oral at bedtime  ferrous    sulfate 325milliGRAM(s) Oral two times a day with meals  dextrose 50% Injectable 12.5Gram(s) IV Push once  dextrose 50% Injectable 25Gram(s) IV Push once  dextrose 50% Injectable 25Gram(s) IV Push once  carvedilol 3.125milliGRAM(s) Oral every 12 hours  dextrose 50% Injectable 50milliLiter(s) IV Push once  aspirin enteric coated 81milliGRAM(s) Oral daily  hydrALAZINE 100milliGRAM(s) Oral three times a day  isosorbide   dinitrate Tablet (ISORDIL) 20milliGRAM(s) Oral three times a day  insulin lispro (HumaLOG) corrective regimen sliding scale  SubCutaneous three times a day before meals  insulin lispro (HumaLOG) corrective regimen sliding scale  SubCutaneous at bedtime  calcium acetate 667milliGRAM(s) Oral three times a day with meals  Nephrocaps 1Capsule(s) Oral daily  AQUAPHOR (petrolatum Ointment) 1Application(s) Topical two times a day  epoetin arian Injectable 43289Cadm(s) IV Push <User Schedule>  iron sucrose IVPB 100milliGRAM(s) IV Intermittent <User Schedule>  valsartan 80milliGRAM(s) Oral two times a day    MEDICATIONS  (PRN):  acetaminophen   Tablet 650milliGRAM(s) Oral every 6 hours PRN For Temp greater than 38.5 C (101.3 F)  acetaminophen   Tablet. 650milliGRAM(s) Oral every 6 hours PRN mild-moderate pain  dextrose Gel 1Dose(s) Oral once PRN Blood Glucose LESS THAN 70 milliGRAM(s)/deciliter  glucagon  Injectable 1milliGRAM(s) IntraMuscular once PRN Glucose LESS THAN 70 milligrams/deciliter      Vital Signs Last 24 Hrs  T(C): 36.4, Max: 36.7 (06-24 @ 21:06)  HR: 92 (92 - 101)  BP: 138/75 (138/75 - 150/77)  RR: 18 (17 - 18)  SpO2: 96% (96% - 99%)  Wt(kg): --  CAPILLARY BLOOD GLUCOSE  225 (25 Jun 2017 13:27)  124 (25 Jun 2017 08:36)  209 (24 Jun 2017 21:06)  140 (24 Jun 2017 18:18)    I&O's Summary  I & Os for 24h ending 25 Jun 2017 07:00  =============================================  IN: 660 ml / OUT: 3500 ml / NET: -2840 ml    I & Os for current day (as of 25 Jun 2017 17:17)  =============================================  IN: 240 ml / OUT: 0 ml / NET: 240 ml      PHYSICAL EXAM:  GENERAL: NAD, well-developed  HEAD:  Atraumatic, Normocephalic  EYES: EOMI, PERRLA, conjunctiva and sclera clear  NECK: Supple, No JVD  CHEST/LUNG: Clear to auscultation bilaterally; No wheeze  HEART: Regular rate and rhythm; No murmurs, rubs, or gallops  ABDOMEN: Soft, Nontender, Nondistended; Bowel sounds present  EXTREMITIES:  2+ Peripheral Pulses, No clubbing, cyanosis, 3+ edema  PSYCH: AAOx3  NEUROLOGY: non-focal  SKIN: No rashes or lesions    LABS:                        9.6    5.7   )-----------( 226      ( 25 Jun 2017 06:51 )             28.6     06-25    138  |  98  |  53<H>  ----------------------------<  126<H>  4.1   |  26  |  5.41<H>    Ca    9.0      25 Jun 2017 06:51  Phos  1.8     06-25  Mg     2.4     06-25                RADIOLOGY & ADDITIONAL TESTS:    Imaging Personally Reviewed:    Consultant(s) Notes Reviewed:      Care Discussed with Consultants/Other Providers:

## 2017-06-25 NOTE — PROGRESS NOTE ADULT - SUBJECTIVE AND OBJECTIVE BOX
Patient is a 47y Male  being evaluated for  ESRD                 no complaints  had UF yesterday         PHYSICAL EXAM:  Vitals:  T(F): 98, Max: 98.5 (06-24 @ 13:30)  HR: 101  BP: 150/77  BP(mean): --  RR: 17  SpO2: 99%  Wt(kg): --  Constitutional: no acute distress  HEENT:  NC, ext ears nl, oropharynx clear,  nose nl  Neck: No JVD, bruit, adenopathy or thyromegaly  Eyes: PERRL, no discharge, no icterus  Respiratory: cta/p bilat, no wheezes, rales, nl effort  Cardiovascular: regular rate, S1 and S2 no rub or gallop  Abd: : BS+, soft, NT , no rebound or guarding, no bruits  Extremities: + edema no cyanosis, palpable pulses  Neurological: A/O x 3, nl coordination and tone    I and O's:    I & Os for current day (as of 06-25 @ 07:55)  =============================================  IN: 660 ml / OUT: 3500 ml / NET: -2840 ml          REVIEW OF SYSTEMS:  CONSTITUTIONAL: No weakness, fevers or chills  RESPIRATORY: No cough, wheezing, hemoptysis; No shortness of breath  CARDIOVASCULAR: No chest pain or palpitations  GI : no abd pains, nausea or vomiting  GENITOURINARY: No dysuria, frequency or hematuria  All other review of systems is negative unless indicated above.    Allergies    No Known Allergies    Intolerances        MEDICATIONS  (STANDING):  atorvastatin 40milliGRAM(s) Oral at bedtime  ferrous    sulfate 325milliGRAM(s) Oral two times a day with meals  dextrose 50% Injectable 12.5Gram(s) IV Push once  dextrose 50% Injectable 25Gram(s) IV Push once  dextrose 50% Injectable 25Gram(s) IV Push once  carvedilol 3.125milliGRAM(s) Oral every 12 hours  dextrose 50% Injectable 50milliLiter(s) IV Push once  aspirin enteric coated 81milliGRAM(s) Oral daily  hydrALAZINE 100milliGRAM(s) Oral three times a day  isosorbide   dinitrate Tablet (ISORDIL) 20milliGRAM(s) Oral three times a day  insulin lispro (HumaLOG) corrective regimen sliding scale  SubCutaneous three times a day before meals  insulin lispro (HumaLOG) corrective regimen sliding scale  SubCutaneous at bedtime  calcium acetate 667milliGRAM(s) Oral three times a day with meals  Nephrocaps 1Capsule(s) Oral daily  AQUAPHOR (petrolatum Ointment) 1Application(s) Topical two times a day  epoetin arian Injectable 03477Eduq(s) IV Push <User Schedule>  iron sucrose IVPB 100milliGRAM(s) IV Intermittent <User Schedule>  valsartan 80milliGRAM(s) Oral two times a day      LABS:  CBC Full  -  ( 25 Jun 2017 06:51 )  WBC Count : 5.7 K/uL  Hemoglobin : 9.6 g/dL  Hematocrit : 28.6 %  Platelet Count - Automated : 226 K/uL  Mean Cell Volume : 97.2 fl  Mean Cell Hemoglobin : 32.5 pg  Mean Cell Hemoglobin Concentration : 33.5 gm/dL  Auto Neutrophil # : x  Auto Lymphocyte # : x  Auto Monocyte # : x  Auto Eosinophil # : x  Auto Basophil # : x  Auto Neutrophil % : x  Auto Lymphocyte % : x  Auto Monocyte % : x  Auto Eosinophil % : x  Auto Basophil % : x    06-25    138  |  98  |  53<H>  ----------------------------<  126<H>  4.1   |  26  |  5.41<H>    Ca    9.0      25 Jun 2017 06:51  Phos  1.8     06-25  Mg     2.4     06-25        Urine Studies:      Urine chemistry:   Urine Na:   Urine Creatinine:   Urine Protein/Cr ratio:  Urine K:   Urine Osm:   24 Hr urine studies:       Imp:  47y Male

## 2017-06-25 NOTE — PROVIDER CONTACT NOTE (OTHER) - BACKGROUND
Pt was admitted w/ ARF and decompensated HF; Pt c/o of insomnia after witnessing an expiration during his dialysis procedure on 06/22/2017. Pt had Ambien on 6/23/17 for insomnia.

## 2017-06-25 NOTE — PROGRESS NOTE ADULT - ASSESSMENT
ESRD/ Hd  CAD/ cardiomyopathy  volume overload improving with increased HD/ UF  tolerated UF yesterday   - Hd again Tomorrow  - epogen with HD  - no renal objection to discharge if HD arranged as outpatient

## 2017-06-25 NOTE — PROGRESS NOTE ADULT - SUBJECTIVE AND OBJECTIVE BOX
He feels well today with more energy. Walking with assistance. He has ongoing edema in his legs, but improved. No chest pressure or dyspnea at rest. No dizziness.       Medications:  atorvastatin 40milliGRAM(s) Oral at bedtime  ferrous    sulfate 325milliGRAM(s) Oral two times a day with meals  acetaminophen   Tablet 650milliGRAM(s) Oral every 6 hours PRN  acetaminophen   Tablet. 650milliGRAM(s) Oral every 6 hours PRN  carvedilol 3.125milliGRAM(s) Oral every 12 hours  dextrose 50% Injectable 50milliLiter(s) IV Push once  aspirin enteric coated 81milliGRAM(s) Oral daily  hydrALAZINE 100milliGRAM(s) Oral three times a day  isosorbide   dinitrate Tablet (ISORDIL) 20milliGRAM(s) Oral three times a day  insulin lispro (HumaLOG) corrective regimen sliding scale  SubCutaneous three times a day before meals  insulin lispro (HumaLOG) corrective regimen sliding scale  SubCutaneous at bedtime  calcium acetate 667milliGRAM(s) Oral three times a day with meals  Nephrocaps 1Capsule(s) Oral daily  AQUAPHOR (petrolatum Ointment) 1Application(s) Topical two times a day  epoetin arian Injectable 04029Apul(s) IV Push <User Schedule>  iron sucrose IVPB 100milliGRAM(s) IV Intermittent <User Schedule>  valsartan 80milliGRAM(s) Oral two times a day      Vital Signs Last 24 Hrs  T(C): 36.7, Max: 36.9 (-24 @ 13:30)  T(F): 98, Max: 98.5 (-24 @ 13:30)  HR: 101 (96 - 102)  BP: 150/77 (144/77 - 158/82)  RR: 17 (16 - 17)  SpO2: 99% (95% - 99%)       Daily Weight in k.9 (2017 08:36)    I&O's Summary    I & Os for current day (as of 2017 10:30)  =============================================  IN: 660 ml / OUT: 3500 ml / NET: -2840 ml    I&O's Detail    I & Os for current day (as of 2017 10:30)  =============================================  IN:    Oral Fluid: 660 ml    Total IN: 660 ml  ---------------------------------------------  OUT:    Other: 3500 ml    Total OUT: 3500 ml  ---------------------------------------------  Total NET: -2840 ml      Physical Exam:     General: No distress. Comfortable.  HEENT: EOM intact.  Neck: Neck supple. JVP mildly elevated. No masses  Chest: Clear to auscultation bilaterally  CV: Normal S1 and S2. No murmurs, rub, or gallops. Radial pulses normal. 1+ edema in lower legs  Abdomen: Soft, non-distended, non-tender  Skin: Chronic venous stasis changes in legs bilaterally  Neurology: Alert and oriented times three. Sensation intact  Psych: Affect normal    Labs:                        9.6    5.7   )-----------( 226      ( 2017 06:51 )             28.6         138  |  98  |  53<H>  ----------------------------<  126<H>  4.1   |  26  |  5.41<H>    Ca    9.0      2017 06:51  Phos  1.8       Mg     2.4

## 2017-06-25 NOTE — PROVIDER CONTACT NOTE (OTHER) - ACTION/TREATMENT ORDERED:
SHERIE Lora agreed with above recommendation and ordered Ambien 5mg x1 dose PRN; RN administered Ambien 5mg as ordered. Will continue to monitor pt.

## 2017-06-25 NOTE — PROVIDER CONTACT NOTE (OTHER) - ASSESSMENT
A&&Ox4, VSS. Pt states " I am unable to sleep since the day I witnessed a patient who  during dialysis treatment".

## 2017-06-25 NOTE — PROGRESS NOTE ADULT - PROBLEM SELECTOR PLAN 1
We will continue daily UF per nephrology to achieve euvolemia.  Increase valsartan to 160mg BID  Continue other neurohormonal antagonists at current dose   Daily standing weights

## 2017-06-25 NOTE — PROVIDER CONTACT NOTE (OTHER) - REASON
B/L LE redness
Patient with complaint of dizziness
Pt asked for sleeping pill due to Insomnia
refusing orthostatics at this time

## 2017-06-26 LAB
ANION GAP SERPL CALC-SCNC: 16 MMOL/L — SIGNIFICANT CHANGE UP (ref 5–17)
BUN SERPL-MCNC: 81 MG/DL — HIGH (ref 7–23)
CALCIUM SERPL-MCNC: 8.9 MG/DL — SIGNIFICANT CHANGE UP (ref 8.4–10.5)
CHLORIDE SERPL-SCNC: 96 MMOL/L — SIGNIFICANT CHANGE UP (ref 96–108)
CO2 SERPL-SCNC: 24 MMOL/L — SIGNIFICANT CHANGE UP (ref 22–31)
CREAT SERPL-MCNC: 6.91 MG/DL — HIGH (ref 0.5–1.3)
GLUCOSE SERPL-MCNC: 132 MG/DL — HIGH (ref 70–99)
HCT VFR BLD CALC: 28.9 % — LOW (ref 39–50)
HGB BLD-MCNC: 9.5 G/DL — LOW (ref 13–17)
MAGNESIUM SERPL-MCNC: 2.6 MG/DL — SIGNIFICANT CHANGE UP (ref 1.6–2.6)
MCHC RBC-ENTMCNC: 31.9 PG — SIGNIFICANT CHANGE UP (ref 27–34)
MCHC RBC-ENTMCNC: 33 GM/DL — SIGNIFICANT CHANGE UP (ref 32–36)
MCV RBC AUTO: 96.6 FL — SIGNIFICANT CHANGE UP (ref 80–100)
PHOSPHATE SERPL-MCNC: 2.1 MG/DL — LOW (ref 2.5–4.5)
PLATELET # BLD AUTO: 228 K/UL — SIGNIFICANT CHANGE UP (ref 150–400)
POTASSIUM SERPL-MCNC: 4.5 MMOL/L — SIGNIFICANT CHANGE UP (ref 3.5–5.3)
POTASSIUM SERPL-SCNC: 4.5 MMOL/L — SIGNIFICANT CHANGE UP (ref 3.5–5.3)
RBC # BLD: 2.99 M/UL — LOW (ref 4.2–5.8)
RBC # FLD: 16 % — HIGH (ref 10.3–14.5)
SODIUM SERPL-SCNC: 136 MMOL/L — SIGNIFICANT CHANGE UP (ref 135–145)
WBC # BLD: 7.1 K/UL — SIGNIFICANT CHANGE UP (ref 3.8–10.5)
WBC # FLD AUTO: 7.1 K/UL — SIGNIFICANT CHANGE UP (ref 3.8–10.5)

## 2017-06-26 PROCEDURE — 99233 SBSQ HOSP IP/OBS HIGH 50: CPT

## 2017-06-26 RX ORDER — CARVEDILOL PHOSPHATE 80 MG/1
1 CAPSULE, EXTENDED RELEASE ORAL
Qty: 60 | Refills: 0
Start: 2017-06-26 | End: 2017-07-26

## 2017-06-26 RX ORDER — VALSARTAN 80 MG/1
1 TABLET ORAL
Qty: 60 | Refills: 0
Start: 2017-06-26 | End: 2017-07-26

## 2017-06-26 RX ORDER — PETROLATUM,WHITE
1 JELLY (GRAM) TOPICAL
Qty: 1 | Refills: 0
Start: 2017-06-26 | End: 2017-07-26

## 2017-06-26 RX ORDER — ISOSORBIDE DINITRATE 5 MG/1
1 TABLET ORAL
Qty: 90 | Refills: 0
Start: 2017-06-26 | End: 2017-07-26

## 2017-06-26 RX ORDER — HYDRALAZINE HCL 50 MG
1 TABLET ORAL
Qty: 90 | Refills: 0
Start: 2017-06-26 | End: 2017-07-26

## 2017-06-26 RX ORDER — VALSARTAN 80 MG/1
160 TABLET ORAL EVERY 12 HOURS
Qty: 0 | Refills: 0 | Status: DISCONTINUED | OUTPATIENT
Start: 2017-06-26 | End: 2017-06-27

## 2017-06-26 RX ORDER — VALSARTAN 80 MG/1
160 TABLET ORAL ONCE
Qty: 0 | Refills: 0 | Status: COMPLETED | OUTPATIENT
Start: 2017-06-26 | End: 2017-06-26

## 2017-06-26 RX ORDER — VALSARTAN 80 MG/1
1 TABLET ORAL
Qty: 60 | Refills: 0 | OUTPATIENT
Start: 2017-06-26 | End: 2017-07-26

## 2017-06-26 RX ADMIN — Medication 81 MILLIGRAM(S): at 13:28

## 2017-06-26 RX ADMIN — CARVEDILOL PHOSPHATE 3.12 MILLIGRAM(S): 80 CAPSULE, EXTENDED RELEASE ORAL at 06:45

## 2017-06-26 RX ADMIN — VALSARTAN 80 MILLIGRAM(S): 80 TABLET ORAL at 06:45

## 2017-06-26 RX ADMIN — Medication 325 MILLIGRAM(S): at 22:41

## 2017-06-26 RX ADMIN — Medication 1 APPLICATION(S): at 18:04

## 2017-06-26 RX ADMIN — ISOSORBIDE DINITRATE 20 MILLIGRAM(S): 5 TABLET ORAL at 22:30

## 2017-06-26 RX ADMIN — ATORVASTATIN CALCIUM 40 MILLIGRAM(S): 80 TABLET, FILM COATED ORAL at 22:28

## 2017-06-26 RX ADMIN — ERYTHROPOIETIN 10000 UNIT(S): 10000 INJECTION, SOLUTION INTRAVENOUS; SUBCUTANEOUS at 19:36

## 2017-06-26 RX ADMIN — VALSARTAN 160 MILLIGRAM(S): 80 TABLET ORAL at 22:38

## 2017-06-26 RX ADMIN — Medication 1 APPLICATION(S): at 06:44

## 2017-06-26 RX ADMIN — ISOSORBIDE DINITRATE 20 MILLIGRAM(S): 5 TABLET ORAL at 06:45

## 2017-06-26 RX ADMIN — CARVEDILOL PHOSPHATE 3.12 MILLIGRAM(S): 80 CAPSULE, EXTENDED RELEASE ORAL at 22:29

## 2017-06-26 RX ADMIN — Medication 100 MILLIGRAM(S): at 06:45

## 2017-06-26 RX ADMIN — Medication 325 MILLIGRAM(S): at 09:05

## 2017-06-26 RX ADMIN — Medication 1 CAPSULE(S): at 13:28

## 2017-06-26 RX ADMIN — Medication 100 MILLIGRAM(S): at 22:29

## 2017-06-26 RX ADMIN — IRON SUCROSE 210 MILLIGRAM(S): 20 INJECTION, SOLUTION INTRAVENOUS at 19:37

## 2017-06-26 NOTE — PROGRESS NOTE ADULT - NSHPATTENDINGPLANDISCUSS_GEN_ALL_CORE
3 [facility] NP team
Nephrology team.
3 [facility] floor NP Team
3 [facility] NP team
Nephrology team.
3 [facility] floor NP Team

## 2017-06-26 NOTE — PROGRESS NOTE ADULT - ASSESSMENT
47M   ESRD  CMP  fluid overload improving  anemia of CKD  cont procrit  HD again today  msg left for care mgt/SW to confirm outpt HD at [place] available for Tuesday - if so, pt can be dc after HD today

## 2017-06-26 NOTE — PROGRESS NOTE ADULT - SUBJECTIVE AND OBJECTIVE BOX
Patient is a 47y Male  being evaluated for ESRD   who reports no complaints overnight.  no sob  ambulating      PHYSICAL EXAM:  Vitals:T(C): 36.7, Max: 36.9 (06-25 @ 21:12)  HR: 94 (92 - 101)  BP: 151/84 (138/75 - 170/90)  RR: 18 (17 - 18)  SpO2: 99% (96% - 99%)  Wt(kg): --    General: no acute distress  HEENT:  NC, ext ears nl, oropharynx clear,  nose nl  Neck: No JVD, bruit, adenopathy or thyromegaly  Eyes: PERRL, no discharge, no icterus  Respiratory: cta/p bilat, no wheezes, rales, nl effort  Cardiovascular: regular rate, S1 and S2 no rub or gallop  Abd: : BS+, soft, NT , no rebound or guarding, no bruits  Extremities: 2+ edema, no cyanosis, palpable pulses  AV fistula with good bruit and thrill  Neurological: A/O x 3, nl coordination and tone    I and O's:  I & Os for current day (as of 06-26 @ 10:38)  =============================================  IN: 480 ml / OUT: 0 ml / NET: 480 ml            REVIEW OF SYSTEMS:  CONSTITUTIONAL: No weakness, fevers or chills  RESPIRATORY: No cough, wheezing, hemoptysis; No shortness of breath  CARDIOVASCULAR: No chest pain or palpitations  GI : no abd pain, nausea or vomiting  GENITOURINARY: No dysuria, frequency or hematuria  All other review of systems is negative unless indicated above.    Allergies    No Known Allergies    Intolerances          MEDICATIONS  (STANDING):  atorvastatin 40milliGRAM(s) Oral at bedtime  ferrous    sulfate 325milliGRAM(s) Oral two times a day with meals  dextrose 50% Injectable 12.5Gram(s) IV Push once  dextrose 50% Injectable 25Gram(s) IV Push once  dextrose 50% Injectable 25Gram(s) IV Push once  carvedilol 3.125milliGRAM(s) Oral every 12 hours  dextrose 50% Injectable 50milliLiter(s) IV Push once  aspirin enteric coated 81milliGRAM(s) Oral daily  hydrALAZINE 100milliGRAM(s) Oral three times a day  isosorbide   dinitrate Tablet (ISORDIL) 20milliGRAM(s) Oral three times a day  insulin lispro (HumaLOG) corrective regimen sliding scale  SubCutaneous three times a day before meals  insulin lispro (HumaLOG) corrective regimen sliding scale  SubCutaneous at bedtime  Nephrocaps 1Capsule(s) Oral daily  AQUAPHOR (petrolatum Ointment) 1Application(s) Topical two times a day  epoetin arian Injectable 48944Lnca(s) IV Push <User Schedule>  iron sucrose IVPB 100milliGRAM(s) IV Intermittent <User Schedule>  valsartan 80milliGRAM(s) Oral two times a day      Sodium,Serum 136    06-26 @ 07:09  Sodium,Serum 138    06-25 @ 06:51  Sodium,Serum 140    06-24 @ 06:50  Sodium,Serum 138    06-23 @ 10:02    Potassium,Serum 4.5    06-26 @ 07:09  Potassium,Serum 4.1    06-25 @ 06:51  Potassium,Serum 4.1    06-24 @ 06:50  Potassium,Serum 4.2    06-23 @ 10:02    Chloride,Serum 96    06-26 @ 07:09  Chloride,Serum 98    06-25 @ 06:51  Chloride,Serum 99    06-24 @ 06:50  Chloride,Serum 97    06-23 @ 10:02    CO2, Serum 24    06-26 @ 07:09  CO2, Serum 26    06-25 @ 06:51  CO2, Serum 28    06-24 @ 06:50  CO2, Serum 30    06-23 @ 10:02    BUN 81    06-26 @ 07:09  BUN 53    06-25 @ 06:51  BUN 29    06-24 @ 06:50  BUN 36    06-23 @ 10:02    Creatinine, Serum 6.91    06-26 @ 07:09  Creatinine, Serum 5.41    06-25 @ 06:51  Creatinine, Serum 3.63    06-24 @ 06:50  Creatinine, Serum 4.14    06-23 @ 10:02      06-26    136  |  96  |  81<H>  ----------------------------<  132<H>  4.5   |  24  |  6.91<H>    Ca    8.9      26 Jun 2017 07:09  Phos  2.1     06-26  Mg     2.6     06-26                              9.5    7.1   )-----------( 228      ( 26 Jun 2017 07:09 )             28.9

## 2017-06-26 NOTE — PROGRESS NOTE ADULT - SUBJECTIVE AND OBJECTIVE BOX
Patient is a 47y old  Male who presents with a chief complaint of acute on chronic heart failure, acute renal failure (31 May 2017 09:48)      SUBJECTIVE / OVERNIGHT EVENTS:feels better. Ambulating .  chest pain no  palpitations no  sob no  nausea no  headache no    MEDICATIONS  (STANDING):  atorvastatin 40milliGRAM(s) Oral at bedtime  ferrous    sulfate 325milliGRAM(s) Oral two times a day with meals  dextrose 50% Injectable 12.5Gram(s) IV Push once  dextrose 50% Injectable 25Gram(s) IV Push once  dextrose 50% Injectable 25Gram(s) IV Push once  carvedilol 3.125milliGRAM(s) Oral every 12 hours  dextrose 50% Injectable 50milliLiter(s) IV Push once  aspirin enteric coated 81milliGRAM(s) Oral daily  hydrALAZINE 100milliGRAM(s) Oral three times a day  isosorbide   dinitrate Tablet (ISORDIL) 20milliGRAM(s) Oral three times a day  insulin lispro (HumaLOG) corrective regimen sliding scale  SubCutaneous three times a day before meals  insulin lispro (HumaLOG) corrective regimen sliding scale  SubCutaneous at bedtime  Nephrocaps 1Capsule(s) Oral daily  AQUAPHOR (petrolatum Ointment) 1Application(s) Topical two times a day  epoetin arian Injectable [id number](s) IV Push <User Schedule>  iron sucrose IVPB 100milliGRAM(s) IV Intermittent <User Schedule>  valsartan 80milliGRAM(s) Oral two times a day    MEDICATIONS  (PRN):  acetaminophen   Tablet 650milliGRAM(s) Oral every 6 hours PRN For Temp greater than 38.5 C (101.3 F)  acetaminophen   Tablet. 650milliGRAM(s) Oral every 6 hours PRN mild-moderate pain  dextrose Gel 1Dose(s) Oral once PRN Blood Glucose LESS THAN 70 milliGRAM(s)/deciliter  glucagon  Injectable 1milliGRAM(s) IntraMuscular once PRN Glucose LESS THAN 70 milligrams/deciliter      Vital Signs Last 24 Hrs  T(C): 36.5, Max: 36.9 (06-25 @ 21:12)  HR: 93 (93 - 101)  BP: 161/86 (151/84 - 170/90)  RR: 18 (17 - 18)  SpO2: 97% (96% - 99%)  Wt(kg): --  CAPILLARY BLOOD GLUCOSE  137 (26 Jun 2017 07:48)  183 (25 Jun 2017 21:57)  148 (25 Jun 2017 18:25)    I&O's Summary  I & Os for 24h ending 26 Jun 2017 07:00  =============================================  IN: 480 ml / OUT: 0 ml / NET: 480 ml    I & Os for current day (as of 26 Jun 2017 16:02)  =============================================  IN: 480 ml / OUT: 0 ml / NET: 480 ml      PHYSICAL EXAM:  GENERAL: NAD, well-developed  HEAD:  Atraumatic, Normocephalic  EYES: EOMI, PERRLA, conjunctiva and sclera clear  NECK: Supple, No JVD  CHEST/LUNG: Clear to auscultation bilaterally; No wheeze  HEART: Regular rate and rhythm; No murmurs, rubs, or gallops  ABDOMEN: Soft, Nontender, Nondistended; Bowel sounds present  EXTREMITIES:  2+ Peripheral Pulses, No clubbing, cyanosis,3+ edema  PSYCH: AAOx3  NEUROLOGY: non-focal  SKIN: No rashes or lesions    LABS:                        9.5    7.1   )-----------( 228      ( 26 Jun 2017 07:09 )             28.9     06-26    136  |  96  |  81<H>  ----------------------------<  132<H>  4.5   |  24  |  6.91<H>    Ca    8.9      26 Jun 2017 07:09  Phos  2.1     06-26  Mg     2.6     06-26                RADIOLOGY & ADDITIONAL TESTS:    Imaging Personally Reviewed:    Consultant(s) Notes Reviewed:      Care Discussed with Consultants/Other Providers:

## 2017-06-26 NOTE — PROGRESS NOTE ADULT - SUBJECTIVE AND OBJECTIVE BOX
I feel good  walking in [place] . Pt remains with some bilateral leg edema and BP remains  elevated     PAST MEDICAL & SURGICAL HISTORY:  Acute on chronic systolic heart failure  Coronary artery disease  Foot ulcer due to secondary DM  Diabetes mellitus: type 2  AICD (automatic cardioverter/defibrillator) present  S/P CABG (coronary artery bypass graft)  Toe amputation status, left  Breast Reduction: at age 18      MEDICATIONS  (STANDING):  atorvastatin 40milliGRAM(s) Oral at bedtime  ferrous    sulfate 325milliGRAM(s) Oral two times a day with meals  dextrose 50% Injectable 12.5Gram(s) IV Push once  dextrose 50% Injectable 25Gram(s) IV Push once  dextrose 50% Injectable 25Gram(s) IV Push once  carvedilol 3.125milliGRAM(s) Oral every 12 hours  dextrose 50% Injectable 50milliLiter(s) IV Push once  aspirin enteric coated 81milliGRAM(s) Oral daily  hydrALAZINE 100milliGRAM(s) Oral three times a day  isosorbide   dinitrate Tablet (ISORDIL) 20milliGRAM(s) Oral three times a day  insulin lispro (HumaLOG) corrective regimen sliding scale  SubCutaneous three times a day before meals  insulin lispro (HumaLOG) corrective regimen sliding scale  SubCutaneous at bedtime  Nephrocaps 1Capsule(s) Oral daily  AQUAPHOR (petrolatum Ointment) 1Application(s) Topical two times a day  epoetin arian Injectable 97760Igta(s) IV Push <User Schedule>  iron sucrose IVPB 100milliGRAM(s) IV Intermittent <User Schedule>  valsartan 80milliGRAM(s) Oral two times a day    MEDICATIONS  (PRN):  acetaminophen   Tablet 650milliGRAM(s) Oral every 6 hours PRN For Temp greater than 38.5 C (101.3 F)  acetaminophen   Tablet. 650milliGRAM(s) Oral every 6 hours PRN mild-moderate pain  dextrose Gel 1Dose(s) Oral once PRN Blood Glucose LESS THAN 70 milliGRAM(s)/deciliter  glucagon  Injectable 1milliGRAM(s) IntraMuscular once PRN Glucose LESS THAN 70 milligrams/deciliter      REVIEW OF SYSTEMS:  Vital Signs Last 24 Hrs  T(C): 36.7, Max: 36.9 (06-25 @ 21:12)  T(F): 98.1, Max: 98.5 (06-25 @ 21:12)  HR: 94 (92 - 101)  BP: 151/84 (138/75 - 170/90)  BP(mean): --  RR: 18 (17 - 18)  SpO2: 99% (96% - 99%)    PHYSICAL EXAM:  General: A/ox 3, No acute Distress  Neck: Supple, MILD  JVD  Cardiac: S1 S2, No M/R/G  Pulmonary: CTAB, Breathing unlabored, No Rhonchi/Rales/Wheezing  Abdomen: Soft, Non -tender, +BS   Extremities: No Rashes,1+ bilateral leg  edema  Neuro: A/o x 3, No focal deficits  Psch: normal mood , normal affect    LABS:  cret                        9.5    7.1   )-----------( 228      ( 26 Jun 2017 07:09 )             28.9     06-26    136  |  96  |  81<H>  ----------------------------<  132<H>  4.5   |  24  |  6.91<H>    Ca    8.9      26 Jun 2017 07:09  Phos  2.1     06-26  Mg     2.6     06-26        Daily     Daily   I&O's Detail    I & Os for current day (as of 26 Jun 2017 11:42)  =============================================  IN:    Oral Fluid: 480 ml    Total IN: 480 ml  ---------------------------------------------  OUT:    Total OUT: 0 ml  ---------------------------------------------  Total NET: 480 ml

## 2017-06-26 NOTE — PROGRESS NOTE ADULT - ASSESSMENT
45yo male w/ PMH HFrEF (17% 4/26/17), CAD s/p CABG x4 2016, ICM s/p recent AICD 5/2017, DM2 c/b peripheral neuropathy w/ toe ulcers presents with 6 days of progressive swelling, orthopnea, dyspnea w GIN on CKD5 requiring initiation of HD and control of HTN  DC home today with dyalisis as OTP. Follow with cardiology and nephrology in 3 days. QA

## 2017-06-26 NOTE — PROGRESS NOTE ADULT - PROBLEM SELECTOR PLAN 1
We will continue daily UF per nephrology to achieve euvolemia.  Increase valsartan to 160mg BID      Daily standing weights

## 2017-06-27 VITALS
SYSTOLIC BLOOD PRESSURE: 123 MMHG | OXYGEN SATURATION: 97 % | TEMPERATURE: 98 F | HEART RATE: 87 BPM | DIASTOLIC BLOOD PRESSURE: 62 MMHG | RESPIRATION RATE: 18 BRPM

## 2017-06-27 RX ADMIN — ISOSORBIDE DINITRATE 20 MILLIGRAM(S): 5 TABLET ORAL at 06:14

## 2017-06-27 RX ADMIN — VALSARTAN 160 MILLIGRAM(S): 80 TABLET ORAL at 06:16

## 2017-06-27 RX ADMIN — Medication 325 MILLIGRAM(S): at 10:00

## 2017-06-27 RX ADMIN — Medication 1 APPLICATION(S): at 06:14

## 2017-06-27 RX ADMIN — Medication 100 MILLIGRAM(S): at 06:14

## 2017-06-27 RX ADMIN — CARVEDILOL PHOSPHATE 3.12 MILLIGRAM(S): 80 CAPSULE, EXTENDED RELEASE ORAL at 06:14

## 2017-06-27 NOTE — PROGRESS NOTE ADULT - PROBLEM SELECTOR PLAN 5
BS control

## 2017-06-27 NOTE — PROGRESS NOTE ADULT - PROBLEM SELECTOR PLAN 7
discussed with patient re transfusion/ will think about it  follow Hct
follow Hct
discussed with patient re transfusion/ will think about it

## 2017-06-27 NOTE — PROGRESS NOTE ADULT - PROBLEM SELECTOR PROBLEM 2
ESRD (end stage renal disease) on dialysis
Essential hypertension
Stage 5 chronic kidney disease
ESRD (end stage renal disease) on dialysis
Essential hypertension
Ischemic cardiomyopathy
Stage 5 chronic kidney disease

## 2017-06-27 NOTE — PROGRESS NOTE ADULT - PROBLEM SELECTOR PROBLEM 5
Diabetes mellitus

## 2017-06-27 NOTE — PROGRESS NOTE ADULT - PROBLEM SELECTOR PROBLEM 4
Acute renal failure, unspecified acute renal failure type

## 2017-06-27 NOTE — PROGRESS NOTE ADULT - PROBLEM SELECTOR PROBLEM 3
Coronary artery disease involving coronary bypass graft of native heart, angina presence unspecified
Depression (emotion)
Coronary artery disease involving coronary bypass graft of native heart, angina presence unspecified
ESRD (end stage renal disease) on dialysis
Depression (emotion)

## 2017-06-27 NOTE — PROGRESS NOTE ADULT - PROBLEM SELECTOR PROBLEM 1
Acute on chronic combined systolic and diastolic heart failure
Acute on chronic systolic heart failure

## 2017-06-27 NOTE — PROGRESS NOTE ADULT - PROBLEM SELECTOR PROBLEM 7
Anemia

## 2017-06-27 NOTE — PROGRESS NOTE ADULT - ASSESSMENT
ESRD/ HD  CAD/ cardiomyopathy  volume overload significantly improved w/ UF  will plan UF today  - epogen with HD  - no renal objection to discharge if HD arranged as outpatient ESRD/ HD  CAD/ cardiomyopathy  volume overload significantly improved w/ UF  no renal objection to discharge today w/ outpatient HD tonight   -  continue epogen with HD  - enforced fluid/ sodium restriction as outpatient with patient

## 2017-06-27 NOTE — PROGRESS NOTE ADULT - ASSESSMENT
47yo male w/ PMH HFrEF (17% 4/26/17), CAD s/p CABG x4 2016, ICM s/p recent AICD 5/2017, DM2 c/b peripheral neuropathy w/ toe ulcers presents with 6 days of progressive swelling, orthopnea, dyspnea w GIN on CKD5 requiring initiation of HD and control of HTN  DC home today. Will have dyalisis as OTP. Follow with cardiology and nephrology in 3 days. QA

## 2017-06-27 NOTE — PROGRESS NOTE ADULT - PROBLEM SELECTOR PLAN 4
HD
HD  arrangements for HD OTP done.  s/p avf  nephrology eval noted
HD  arrangements for HD OTP done.  s/p avf  nephrology eval noted
HD  arrangements for HD OTP in progress
HD  arrangements for HD OTP in progress  s/p avf
HD  arrangements for HD OTP in progress  s/p avf  nephrology eval noted
HD  arrangements for HD OTP in progress  s/p avf  nephrology eval noted
HD  arrangements for HD OTP in progress  s/p avf - vascular reeval  noted
HD  arrangements for HD OTP in progress  s/p avf - vascular reeval called re sutures removal
HD

## 2017-06-27 NOTE — PROGRESS NOTE ADULT - SUBJECTIVE AND OBJECTIVE BOX
Patient is a 47y Male  being evaluated for                 who reports no complaints overnight.        PHYSICAL EXAM:  Vitals:  T(F): 98.3 (06-27-17 @ 04:08), Max: 99.6 (06-26-17 @ 22:08)  HR: 81 (06-27-17 @ 04:08)  BP: 122/71 (06-27-17 @ 04:08)  BP(mean): --  RR: 16 (06-27-17 @ 04:08)  SpO2: 97% (06-27-17 @ 04:08)  Wt(kg): --  Constitutional: no acute distress  HEENT:  NC, ext ears nl, oropharynx clear,  nose nl  Neck: No JVD, bruit, adenopathy or thyromegaly  Eyes: PERRL, no discharge, no icterus  Respiratory: cta/p bilat, no wheezes, rales, nl effort  Cardiovascular: regular rate, S1 and S2 no rub or gallop  Abd: : BS+, soft, NT , no rebound or guarding, no bruits  Extremities: no edema or cyanosis, palpable pulses  Neurological: A/O x 3, nl coordination and tone    I and O's:    06-26 @ 07:01  -  06-27 @ 07:00  --------------------------------------------------------  IN: 480 mL / OUT: 3500 mL / NET: -3020 mL            REVIEW OF SYSTEMS:  CONSTITUTIONAL: No weakness, fevers or chills  RESPIRATORY: No cough, wheezing, hemoptysis; No shortness of breath  CARDIOVASCULAR: No chest pain or palpitations  GI : no abd pains, nausea or vomiting  GENITOURINARY: No dysuria, frequency or hematuria  All other review of systems is negative unless indicated above.    Allergies    No Known Allergies    Intolerances        MEDICATIONS  (STANDING):  atorvastatin 40 milliGRAM(s) Oral at bedtime  ferrous    sulfate 325 milliGRAM(s) Oral two times a day with meals  dextrose 50% Injectable 12.5 Gram(s) IV Push once  dextrose 50% Injectable 25 Gram(s) IV Push once  dextrose 50% Injectable 25 Gram(s) IV Push once  carvedilol 3.125 milliGRAM(s) Oral every 12 hours  dextrose 50% Injectable 50 milliLiter(s) IV Push once  aspirin enteric coated 81 milliGRAM(s) Oral daily  hydrALAZINE 100 milliGRAM(s) Oral three times a day  isosorbide   dinitrate Tablet (ISORDIL) 20 milliGRAM(s) Oral three times a day  insulin lispro (HumaLOG) corrective regimen sliding scale   SubCutaneous three times a day before meals  insulin lispro (HumaLOG) corrective regimen sliding scale   SubCutaneous at bedtime  Nephrocaps 1 Capsule(s) Oral daily  AQUAPHOR (petrolatum Ointment) 1 Application(s) Topical two times a day  epoetin arian Injectable 39028 Unit(s) IV Push <User Schedule>  iron sucrose IVPB 100 milliGRAM(s) IV Intermittent <User Schedule>  valsartan 160 milliGRAM(s) Oral every 12 hours      LABS:  CBC Full  -  ( 26 Jun 2017 07:09 )  WBC Count : 7.1 K/uL  Hemoglobin : 9.5 g/dL  Hematocrit : 28.9 %  Platelet Count - Automated : 228 K/uL  Mean Cell Volume : 96.6 fl  Mean Cell Hemoglobin : 31.9 pg  Mean Cell Hemoglobin Concentration : 33.0 gm/dL  Auto Neutrophil # : x  Auto Lymphocyte # : x  Auto Monocyte # : x  Auto Eosinophil # : x  Auto Basophil # : x  Auto Neutrophil % : x  Auto Lymphocyte % : x  Auto Monocyte % : x  Auto Eosinophil % : x  Auto Basophil % : x    06-26    136  |  96  |  81<H>  ----------------------------<  132<H>  4.5   |  24  |  6.91<H>    Ca    8.9      26 Jun 2017 07:09  Phos  2.1     06-26  Mg     2.6     06-26        Urine Studies:      Urine chemistry:   Urine Na:   Urine Creatinine:   Urine Protein/Cr ratio:  Urine K:   Urine Osm:   24 Hr urine studies:       Imp:  47y Male Patient is a 47y Male  being evaluated for  ESRD                 who reports no complaints overnight.        PHYSICAL EXAM:  Vitals:  T(F): 98.3 (06-27-17 @ 04:08), Max: 99.6 (06-26-17 @ 22:08)  HR: 81 (06-27-17 @ 04:08)  BP: 122/71 (06-27-17 @ 04:08)  BP(mean): --  RR: 16 (06-27-17 @ 04:08)  SpO2: 97% (06-27-17 @ 04:08)  Wt(kg): --  Constitutional: no acute distress  HEENT:  NC, ext ears nl, oropharynx clear,  nose nl  Neck: No JVD, bruit, adenopathy or thyromegaly  Eyes: PERRL, no discharge, no icterus  Respiratory: cta/p bilat, no wheezes, rales, nl effort  Cardiovascular: regular rate, S1 and S2 no rub or gallop  Abd: : BS+, soft, NT , no rebound or guarding, no bruits  Extremities:+ edema / no cyanosis, palpable pulses, + thrill AVF   Neurological: A/O x 3, nl coordination and tone    I and O's:    06-26 @ 07:01  -  06-27 @ 07:00  --------------------------------------------------------  IN: 480 mL / OUT: 3500 mL / NET: -3020 mL            REVIEW OF SYSTEMS:  CONSTITUTIONAL: No weakness, fevers or chills  RESPIRATORY: No cough, wheezing, hemoptysis; No shortness of breath  CARDIOVASCULAR: No chest pain or palpitations  GI : no abd pains, nausea or vomiting  GENITOURINARY: No dysuria, frequency or hematuria  All other review of systems is negative unless indicated above.    Allergies    No Known Allergies    Intolerances        MEDICATIONS  (STANDING):  atorvastatin 40 milliGRAM(s) Oral at bedtime  ferrous    sulfate 325 milliGRAM(s) Oral two times a day with meals  dextrose 50% Injectable 12.5 Gram(s) IV Push once  dextrose 50% Injectable 25 Gram(s) IV Push once  dextrose 50% Injectable 25 Gram(s) IV Push once  carvedilol 3.125 milliGRAM(s) Oral every 12 hours  dextrose 50% Injectable 50 milliLiter(s) IV Push once  aspirin enteric coated 81 milliGRAM(s) Oral daily  hydrALAZINE 100 milliGRAM(s) Oral three times a day  isosorbide   dinitrate Tablet (ISORDIL) 20 milliGRAM(s) Oral three times a day  insulin lispro (HumaLOG) corrective regimen sliding scale   SubCutaneous three times a day before meals  insulin lispro (HumaLOG) corrective regimen sliding scale   SubCutaneous at bedtime  Nephrocaps 1 Capsule(s) Oral daily  AQUAPHOR (petrolatum Ointment) 1 Application(s) Topical two times a day  epoetin arian Injectable 48284 Unit(s) IV Push <User Schedule>  iron sucrose IVPB 100 milliGRAM(s) IV Intermittent <User Schedule>  valsartan 160 milliGRAM(s) Oral every 12 hours      LABS:  CBC Full  -  ( 26 Jun 2017 07:09 )  WBC Count : 7.1 K/uL  Hemoglobin : 9.5 g/dL  Hematocrit : 28.9 %  Platelet Count - Automated : 228 K/uL  Mean Cell Volume : 96.6 fl  Mean Cell Hemoglobin : 31.9 pg  Mean Cell Hemoglobin Concentration : 33.0 gm/dL  Auto Neutrophil # : x  Auto Lymphocyte # : x  Auto Monocyte # : x  Auto Eosinophil # : x  Auto Basophil # : x  Auto Neutrophil % : x  Auto Lymphocyte % : x  Auto Monocyte % : x  Auto Eosinophil % : x  Auto Basophil % : x    06-26    136  |  96  |  81<H>  ----------------------------<  132<H>  4.5   |  24  |  6.91<H>    Ca    8.9      26 Jun 2017 07:09  Phos  2.1     06-26  Mg     2.6     06-26        Urine Studies:      Urine chemistry:   Urine Na:   Urine Creatinine:   Urine Protein/Cr ratio:  Urine K:   Urine Osm:   24 Hr urine studies:       Imp:  47y Male

## 2017-06-27 NOTE — PROGRESS NOTE ADULT - SUBJECTIVE AND OBJECTIVE BOX
Patient is a 47y old  Male who presents with a chief complaint of acute on chronic heart failure, acute renal failure (31 May 2017 09:48)      SUBJECTIVE / OVERNIGHT EVENTS: comfortable  Review of Systems  chest pain no  palpitations no  sob no  nausea no  headache no    MEDICATIONS  (STANDING):  atorvastatin 40 milliGRAM(s) Oral at bedtime  ferrous    sulfate 325 milliGRAM(s) Oral two times a day with meals  dextrose 50% Injectable 12.5 Gram(s) IV Push once  dextrose 50% Injectable 25 Gram(s) IV Push once  dextrose 50% Injectable 25 Gram(s) IV Push once  carvedilol 3.125 milliGRAM(s) Oral every 12 hours  dextrose 50% Injectable 50 milliLiter(s) IV Push once  aspirin enteric coated 81 milliGRAM(s) Oral daily  hydrALAZINE 100 milliGRAM(s) Oral three times a day  isosorbide   dinitrate Tablet (ISORDIL) 20 milliGRAM(s) Oral three times a day  insulin lispro (HumaLOG) corrective regimen sliding scale   SubCutaneous three times a day before meals  insulin lispro (HumaLOG) corrective regimen sliding scale   SubCutaneous at bedtime  Nephrocaps 1 Capsule(s) Oral daily  AQUAPHOR (petrolatum Ointment) 1 Application(s) Topical two times a day  epoetin arian Injectable 34533 Unit(s) IV Push <User Schedule>  iron sucrose IVPB 100 milliGRAM(s) IV Intermittent <User Schedule>  valsartan 160 milliGRAM(s) Oral every 12 hours    MEDICATIONS  (PRN):  acetaminophen   Tablet 650 milliGRAM(s) Oral every 6 hours PRN For Temp greater than 38.5 C (101.3 F)  acetaminophen   Tablet. 650 milliGRAM(s) Oral every 6 hours PRN mild-moderate pain  dextrose Gel 1 Dose(s) Oral once PRN Blood Glucose LESS THAN 70 milliGRAM(s)/deciliter  glucagon  Injectable 1 milliGRAM(s) IntraMuscular once PRN Glucose LESS THAN 70 milligrams/deciliter      Vital Signs Last 24 Hrs  T(C): 36.8 (06-27-17 @ 04:08), Max: 37.6 (06-26-17 @ 22:08)  HR: 81 (06-27-17 @ 04:08) (81 - 100)  BP: 122/71 (06-27-17 @ 04:08) (122/71 - 164/93)  RR: 16 (06-27-17 @ 04:08) (16 - 18)  SpO2: 97% (06-27-17 @ 04:08) (95% - 97%)  CAPILLARY BLOOD GLUCOSE  112 (27 Jun 2017 08:39)  139 (26 Jun 2017 22:33)  123 (26 Jun 2017 18:03)        I&O's Summary    26 Jun 2017 07:01  -  27 Jun 2017 07:00  --------------------------------------------------------  IN: 480 mL / OUT: 3500 mL / NET: -3020 mL        PHYSICAL EXAM:  GENERAL: NAD, well-developed  HEAD:  Atraumatic, Normocephalic  EYES: EOMI, PERRLA, conjunctiva and sclera clear  NECK: Supple, No JVD  CHEST/LUNG: Clear to auscultation bilaterally; No wheeze  HEART: Regular rate and rhythm; No murmurs, rubs, or gallops  ABDOMEN: Soft, Nontender, Nondistended; Bowel sounds present  EXTREMITIES:  2+ Peripheral Pulses, No clubbing, cyanosis,3+edema  PSYCH: AAOx3  NEUROLOGY: non-focal  SKIN: No rashes or lesions    LABS:                        9.5    7.1   )-----------( 228      ( 26 Jun 2017 07:09 )             28.9     06-26    136  |  96  |  81<H>  ----------------------------<  132<H>  4.5   |  24  |  6.91<H>    Ca    8.9      26 Jun 2017 07:09  Phos  2.1     06-26  Mg     2.6     06-26                RADIOLOGY & ADDITIONAL TESTS:    Imaging Personally Reviewed:    Consultant(s) Notes Reviewed:      Care Discussed with Consultants/Other Providers:

## 2017-06-27 NOTE — PROGRESS NOTE ADULT - PROBLEM SELECTOR PLAN 2
Daily UF until euvolemic
Daily UF until euvolemic. Hopefully will achieve this goal this week
Daily UF until euvolemic
Daily UF until euvolemic. Hopefully will achieve this goal this week
bp control
Continue atorvastatin and aspirin.
bp control
Daily UF until euvolemic

## 2017-06-27 NOTE — PROGRESS NOTE ADULT - PROBLEM SELECTOR PLAN 6
observe  orthostatics  dc amlodipine
observe  orthostatics  dc amlodipine  discontinue telemetry
observe  orthostatics  dc amlodipine

## 2017-07-27 ENCOUNTER — APPOINTMENT (OUTPATIENT)
Dept: VASCULAR SURGERY | Facility: CLINIC | Age: 47
End: 2017-07-27

## 2017-08-07 ENCOUNTER — APPOINTMENT (OUTPATIENT)
Dept: CARDIOLOGY | Facility: CLINIC | Age: 47
End: 2017-08-07

## 2017-12-09 ENCOUNTER — EMERGENCY (EMERGENCY)
Facility: HOSPITAL | Age: 47
LOS: 1 days | Discharge: HOME | End: 2017-12-09
Attending: EMERGENCY MEDICINE | Admitting: EMERGENCY MEDICINE
Payer: MEDICARE

## 2017-12-09 VITALS
WEIGHT: 189.6 LBS | HEART RATE: 106 BPM | SYSTOLIC BLOOD PRESSURE: 212 MMHG | TEMPERATURE: 98 F | OXYGEN SATURATION: 99 % | DIASTOLIC BLOOD PRESSURE: 124 MMHG | RESPIRATION RATE: 18 BRPM

## 2017-12-09 VITALS — RESPIRATION RATE: 20 BRPM | TEMPERATURE: 98 F | OXYGEN SATURATION: 98 % | HEART RATE: 106 BPM

## 2017-12-09 DIAGNOSIS — Z95.810 PRESENCE OF AUTOMATIC (IMPLANTABLE) CARDIAC DEFIBRILLATOR: Chronic | ICD-10-CM

## 2017-12-09 DIAGNOSIS — Z95.1 PRESENCE OF AORTOCORONARY BYPASS GRAFT: Chronic | ICD-10-CM

## 2017-12-09 DIAGNOSIS — Z89.422 ACQUIRED ABSENCE OF OTHER LEFT TOE(S): Chronic | ICD-10-CM

## 2017-12-09 LAB
ALBUMIN SERPL ELPH-MCNC: 3.9 G/DL — SIGNIFICANT CHANGE UP (ref 3.3–5)
ALP SERPL-CCNC: 125 U/L — HIGH (ref 40–120)
ALT FLD-CCNC: 26 U/L RC — SIGNIFICANT CHANGE UP (ref 10–45)
ANION GAP SERPL CALC-SCNC: 17 MMOL/L — SIGNIFICANT CHANGE UP (ref 5–17)
APTT BLD: 33 SEC — SIGNIFICANT CHANGE UP (ref 27.5–37.4)
AST SERPL-CCNC: 16 U/L — SIGNIFICANT CHANGE UP (ref 10–40)
BASE EXCESS BLDV CALC-SCNC: 0.4 MMOL/L — SIGNIFICANT CHANGE UP (ref -2–2)
BASOPHILS # BLD AUTO: 0 K/UL — SIGNIFICANT CHANGE UP (ref 0–0.2)
BASOPHILS NFR BLD AUTO: 0.5 % — SIGNIFICANT CHANGE UP (ref 0–2)
BILIRUB SERPL-MCNC: 0.4 MG/DL — SIGNIFICANT CHANGE UP (ref 0.2–1.2)
BLD GP AB SCN SERPL QL: NEGATIVE — SIGNIFICANT CHANGE UP
BUN SERPL-MCNC: 73 MG/DL — HIGH (ref 7–23)
CA-I SERPL-SCNC: 1.21 MMOL/L — SIGNIFICANT CHANGE UP (ref 1.12–1.3)
CALCIUM SERPL-MCNC: 9.4 MG/DL — SIGNIFICANT CHANGE UP (ref 8.4–10.5)
CHLORIDE BLDV-SCNC: 95 MMOL/L — LOW (ref 96–108)
CHLORIDE SERPL-SCNC: 92 MMOL/L — LOW (ref 96–108)
CO2 BLDV-SCNC: 28 MMOL/L — SIGNIFICANT CHANGE UP (ref 22–30)
CO2 SERPL-SCNC: 23 MMOL/L — SIGNIFICANT CHANGE UP (ref 22–31)
CREAT SERPL-MCNC: 6.85 MG/DL — HIGH (ref 0.5–1.3)
EOSINOPHIL # BLD AUTO: 0 K/UL — SIGNIFICANT CHANGE UP (ref 0–0.5)
EOSINOPHIL NFR BLD AUTO: 0.1 % — SIGNIFICANT CHANGE UP (ref 0–6)
GAS PNL BLDV: 135 MMOL/L — LOW (ref 136–145)
GAS PNL BLDV: SIGNIFICANT CHANGE UP
GAS PNL BLDV: SIGNIFICANT CHANGE UP
GLUCOSE BLDV-MCNC: 196 MG/DL — HIGH (ref 70–99)
GLUCOSE SERPL-MCNC: 192 MG/DL — HIGH (ref 70–99)
HCO3 BLDV-SCNC: 27 MMOL/L — SIGNIFICANT CHANGE UP (ref 21–29)
HCT VFR BLD CALC: 32.4 % — LOW (ref 39–50)
HCT VFR BLDA CALC: 36 % — LOW (ref 39–50)
HGB BLD CALC-MCNC: 11.8 G/DL — LOW (ref 13–17)
HGB BLD-MCNC: 11.5 G/DL — LOW (ref 13–17)
INR BLD: 1.07 RATIO — SIGNIFICANT CHANGE UP (ref 0.88–1.16)
LACTATE BLDV-MCNC: 1.7 MMOL/L — SIGNIFICANT CHANGE UP (ref 0.7–2)
LYMPHOCYTES # BLD AUTO: 1.6 K/UL — SIGNIFICANT CHANGE UP (ref 1–3.3)
LYMPHOCYTES # BLD AUTO: 17.8 % — SIGNIFICANT CHANGE UP (ref 13–44)
MCHC RBC-ENTMCNC: 33.6 PG — SIGNIFICANT CHANGE UP (ref 27–34)
MCHC RBC-ENTMCNC: 35.3 GM/DL — SIGNIFICANT CHANGE UP (ref 32–36)
MCV RBC AUTO: 95.3 FL — SIGNIFICANT CHANGE UP (ref 80–100)
MONOCYTES # BLD AUTO: 1.2 K/UL — HIGH (ref 0–0.9)
MONOCYTES NFR BLD AUTO: 13.2 % — SIGNIFICANT CHANGE UP (ref 2–14)
NEUTROPHILS # BLD AUTO: 6 K/UL — SIGNIFICANT CHANGE UP (ref 1.8–7.4)
NEUTROPHILS NFR BLD AUTO: 68.5 % — SIGNIFICANT CHANGE UP (ref 43–77)
PCO2 BLDV: 53 MMHG — HIGH (ref 35–50)
PH BLDV: 7.32 — LOW (ref 7.35–7.45)
PLATELET # BLD AUTO: 214 K/UL — SIGNIFICANT CHANGE UP (ref 150–400)
PO2 BLDV: 26 MMHG — SIGNIFICANT CHANGE UP (ref 25–45)
POTASSIUM BLDV-SCNC: 4.5 MMOL/L — SIGNIFICANT CHANGE UP (ref 3.5–5)
POTASSIUM SERPL-MCNC: 4.3 MMOL/L — SIGNIFICANT CHANGE UP (ref 3.5–5.3)
POTASSIUM SERPL-SCNC: 4.3 MMOL/L — SIGNIFICANT CHANGE UP (ref 3.5–5.3)
PROT SERPL-MCNC: 8.4 G/DL — HIGH (ref 6–8.3)
PROTHROM AB SERPL-ACNC: 11.7 SEC — SIGNIFICANT CHANGE UP (ref 9.8–12.7)
RBC # BLD: 3.41 M/UL — LOW (ref 4.2–5.8)
RBC # FLD: 13.6 % — SIGNIFICANT CHANGE UP (ref 10.3–14.5)
RH IG SCN BLD-IMP: POSITIVE — SIGNIFICANT CHANGE UP
SAO2 % BLDV: 38 % — LOW (ref 67–88)
SODIUM SERPL-SCNC: 132 MMOL/L — LOW (ref 135–145)
WBC # BLD: 8.7 K/UL — SIGNIFICANT CHANGE UP (ref 3.8–10.5)
WBC # FLD AUTO: 8.7 K/UL — SIGNIFICANT CHANGE UP (ref 3.8–10.5)

## 2017-12-09 PROCEDURE — 99284 EMERGENCY DEPT VISIT MOD MDM: CPT | Mod: 25

## 2017-12-09 PROCEDURE — 86850 RBC ANTIBODY SCREEN: CPT

## 2017-12-09 PROCEDURE — 82435 ASSAY OF BLOOD CHLORIDE: CPT

## 2017-12-09 PROCEDURE — 93005 ELECTROCARDIOGRAM TRACING: CPT

## 2017-12-09 PROCEDURE — 86900 BLOOD TYPING SEROLOGIC ABO: CPT

## 2017-12-09 PROCEDURE — 80053 COMPREHEN METABOLIC PANEL: CPT

## 2017-12-09 PROCEDURE — 85014 HEMATOCRIT: CPT

## 2017-12-09 PROCEDURE — 82947 ASSAY GLUCOSE BLOOD QUANT: CPT

## 2017-12-09 PROCEDURE — 85610 PROTHROMBIN TIME: CPT

## 2017-12-09 PROCEDURE — 84295 ASSAY OF SERUM SODIUM: CPT

## 2017-12-09 PROCEDURE — 84132 ASSAY OF SERUM POTASSIUM: CPT

## 2017-12-09 PROCEDURE — 93010 ELECTROCARDIOGRAM REPORT: CPT

## 2017-12-09 PROCEDURE — 85027 COMPLETE CBC AUTOMATED: CPT

## 2017-12-09 PROCEDURE — 82330 ASSAY OF CALCIUM: CPT

## 2017-12-09 PROCEDURE — 82803 BLOOD GASES ANY COMBINATION: CPT

## 2017-12-09 PROCEDURE — 83605 ASSAY OF LACTIC ACID: CPT

## 2017-12-09 PROCEDURE — 86901 BLOOD TYPING SEROLOGIC RH(D): CPT

## 2017-12-09 PROCEDURE — 85730 THROMBOPLASTIN TIME PARTIAL: CPT

## 2017-12-09 NOTE — ED ADULT NURSE REASSESSMENT NOTE - NS ED NURSE REASSESS COMMENT FT1
MD Bubba Valerio present in room at time of pt discharge states do not take blood pressure, pt needs to go directly to dialysis, pt aware

## 2017-12-09 NOTE — ED PROVIDER NOTE - OBJECTIVE STATEMENT
45yo male w/ PMH HFrEF (17% 4/26/17), CAD s/p CABG x4 2016, ICM s/p recent AICD 5/2017, DM2 c/b peripheral neuropathy who presents to the ED for low h/h 47yo male w/ PMH HFrEF (17% 4/26/17), CAD s/p CABG x4 2016, ICM s/p recent AICD 5/2017, DM2 c/b peripheral neuropathy who presents to the ED for low h/h,. scheduled for dialysis  today and states he is feeling fine but was told his h/h was to low to proceed. no chest pain/sob/le edema.

## 2017-12-09 NOTE — ED ADULT NURSE NOTE - OBJECTIVE STATEMENT
Pt is an ambulatory 47 yr old male a/oX 3 sent from dialysis for low H/H prior to dialysis.  Perrl wnl, benitez with equal strength.  LUNGS CTA no chest pain or sob.  NSR on cm at 89 bpm.  Denies syncope, weakness, dizziness.  Normal skin tone.  No Fevers, chills or N/V.  Abdomen NT Nd witH BS+ 4q.  SKIN WDI.   No signs of bleeding.

## 2017-12-09 NOTE — ED PROVIDER NOTE - CARE PLAN
Principal Discharge DX:	Anemia of chronic disease  Instructions for follow-up, activity and diet:	Follow up with your Primary Care Physician within the next 2-3 days  Bring a copy of your test results with you to your appointment  Continue your current medication regimen  Return to the Emergency Room if you experience new or worsening symptoms  GO STRAIGHT TO DIALYSIS  bring results so they are aware of your h/h Principal Discharge DX:	Hypertension  Instructions for follow-up, activity and diet:	Follow up with your Primary Care Physician within the next 2-3 days  Bring a copy of your test results with you to your appointment  Continue your current medication regimen  Return to the Emergency Room if you experience new or worsening symptoms  GO STRAIGHT TO DIALYSIS  bring results so they are aware of your h/h  Secondary Diagnosis:	End stage renal disease

## 2017-12-09 NOTE — ED PROVIDER NOTE - ATTENDING CONTRIBUTION TO CARE
Patient sent by dialysis center for anemia on labs done at last session.  Patient offering no complaints at this time.  Planned for dialysis later this afternoon.  Hypertensive in ED (but did not take antihypertensives today and usually is hypertensive prior to dialysis per patient), otherwise unremarkable exam.  Will repeat labs, discuss with his nephrologist, possible discharge for outpatient dialysis today.

## 2017-12-09 NOTE — ED PROVIDER NOTE - PROGRESS NOTE DETAILS
spoke with Dr. Romo who is patients nephrologist regarding being sent for transfusion. patient with stable h/h. went over vitals and he states that the patient is always 200's/120s prior to the dialysis and he recommends that unless his potassium is critically high that we discharge straight to dialysis. - Eve King PA-C

## 2017-12-09 NOTE — ED PROVIDER NOTE - PLAN OF CARE
Follow up with your Primary Care Physician within the next 2-3 days  Bring a copy of your test results with you to your appointment  Continue your current medication regimen  Return to the Emergency Room if you experience new or worsening symptoms  GO STRAIGHT TO DIALYSIS  bring results so they are aware of your h/h

## 2017-12-18 PROCEDURE — 93005 ELECTROCARDIOGRAM TRACING: CPT

## 2017-12-18 PROCEDURE — 83880 ASSAY OF NATRIURETIC PEPTIDE: CPT

## 2017-12-18 PROCEDURE — 83735 ASSAY OF MAGNESIUM: CPT

## 2017-12-18 PROCEDURE — 84480 ASSAY TRIIODOTHYRONINE (T3): CPT

## 2017-12-18 PROCEDURE — 97116 GAIT TRAINING THERAPY: CPT

## 2017-12-18 PROCEDURE — C1750: CPT

## 2017-12-18 PROCEDURE — 36556 INSERT NON-TUNNEL CV CATH: CPT

## 2017-12-18 PROCEDURE — 80053 COMPREHEN METABOLIC PANEL: CPT

## 2017-12-18 PROCEDURE — 86706 HEP B SURFACE ANTIBODY: CPT

## 2017-12-18 PROCEDURE — 80048 BASIC METABOLIC PNL TOTAL CA: CPT

## 2017-12-18 PROCEDURE — 86901 BLOOD TYPING SEROLOGIC RH(D): CPT

## 2017-12-18 PROCEDURE — 85610 PROTHROMBIN TIME: CPT

## 2017-12-18 PROCEDURE — 82947 ASSAY GLUCOSE BLOOD QUANT: CPT

## 2017-12-18 PROCEDURE — 97110 THERAPEUTIC EXERCISES: CPT

## 2017-12-18 PROCEDURE — 82550 ASSAY OF CK (CPK): CPT

## 2017-12-18 PROCEDURE — 87340 HEPATITIS B SURFACE AG IA: CPT

## 2017-12-18 PROCEDURE — 82435 ASSAY OF BLOOD CHLORIDE: CPT

## 2017-12-18 PROCEDURE — 86900 BLOOD TYPING SEROLOGIC ABO: CPT

## 2017-12-18 PROCEDURE — 83605 ASSAY OF LACTIC ACID: CPT

## 2017-12-18 PROCEDURE — 84443 ASSAY THYROID STIM HORMONE: CPT

## 2017-12-18 PROCEDURE — 85730 THROMBOPLASTIN TIME PARTIAL: CPT

## 2017-12-18 PROCEDURE — 83550 IRON BINDING TEST: CPT

## 2017-12-18 PROCEDURE — C1769: CPT

## 2017-12-18 PROCEDURE — 96374 THER/PROPH/DIAG INJ IV PUSH: CPT

## 2017-12-18 PROCEDURE — 99285 EMERGENCY DEPT VISIT HI MDM: CPT | Mod: 25

## 2017-12-18 PROCEDURE — 85027 COMPLETE CBC AUTOMATED: CPT

## 2017-12-18 PROCEDURE — 82553 CREATINE MB FRACTION: CPT

## 2017-12-18 PROCEDURE — 94640 AIRWAY INHALATION TREATMENT: CPT

## 2017-12-18 PROCEDURE — 84484 ASSAY OF TROPONIN QUANT: CPT

## 2017-12-18 PROCEDURE — 97163 PT EVAL HIGH COMPLEX 45 MIN: CPT

## 2017-12-18 PROCEDURE — 77001 FLUOROGUIDE FOR VEIN DEVICE: CPT

## 2017-12-18 PROCEDURE — 84100 ASSAY OF PHOSPHORUS: CPT

## 2017-12-18 PROCEDURE — 86803 HEPATITIS C AB TEST: CPT

## 2017-12-18 PROCEDURE — 84132 ASSAY OF SERUM POTASSIUM: CPT

## 2017-12-18 PROCEDURE — 84436 ASSAY OF TOTAL THYROXINE: CPT

## 2017-12-18 PROCEDURE — C1894: CPT

## 2017-12-18 PROCEDURE — 71045 X-RAY EXAM CHEST 1 VIEW: CPT

## 2017-12-18 PROCEDURE — 86705 HEP B CORE ANTIBODY IGM: CPT

## 2017-12-18 PROCEDURE — 82010 KETONE BODYS QUAN: CPT

## 2017-12-18 PROCEDURE — 93306 TTE W/DOPPLER COMPLETE: CPT

## 2017-12-18 PROCEDURE — 84295 ASSAY OF SERUM SODIUM: CPT

## 2017-12-18 PROCEDURE — 82728 ASSAY OF FERRITIN: CPT

## 2017-12-18 PROCEDURE — 99261: CPT

## 2017-12-18 PROCEDURE — 82330 ASSAY OF CALCIUM: CPT

## 2017-12-18 PROCEDURE — 76937 US GUIDE VASCULAR ACCESS: CPT

## 2017-12-18 PROCEDURE — 87040 BLOOD CULTURE FOR BACTERIA: CPT

## 2017-12-18 PROCEDURE — 85014 HEMATOCRIT: CPT

## 2017-12-18 PROCEDURE — C1757: CPT

## 2017-12-18 PROCEDURE — 96375 TX/PRO/DX INJ NEW DRUG ADDON: CPT

## 2017-12-18 PROCEDURE — 97530 THERAPEUTIC ACTIVITIES: CPT

## 2017-12-18 PROCEDURE — 86850 RBC ANTIBODY SCREEN: CPT

## 2017-12-18 PROCEDURE — 93970 EXTREMITY STUDY: CPT

## 2017-12-18 PROCEDURE — 82803 BLOOD GASES ANY COMBINATION: CPT

## 2017-12-18 PROCEDURE — 81001 URINALYSIS AUTO W/SCOPE: CPT

## 2018-02-01 ENCOUNTER — MEDICATION RENEWAL (OUTPATIENT)
Age: 48
End: 2018-02-01

## 2019-01-01 NOTE — PROCEDURE NOTE - INTERROGATION NOTE: RIGHT VENTRICULAR LEAD
Problem: PAIN -   Goal: Displays adequate comfort level or baseline comfort level  Description  INTERVENTIONS:  - Perform pain scoring using age-appropriate tool with hands-on care as needed  Notify physician/AP of high pain scores not responsive to comfort measures  - Administer analgesics based on type and severity of pain and evaluate response  - Sucrose analgesia per protocol for brief minor painful procedures  - Teach parents interventions for comforting infant  Outcome: Progressing     Problem: THERMOREGULATION - /PEDIATRICS  Goal: Maintains normal body temperature  Description  Interventions:  - Monitor temperature (axillary for Newborns) as ordered  - Monitor for signs of hypothermia or hyperthermia  - Provide thermal support measures  - Wean to open crib when appropriate  Outcome: Progressing     Problem: INFECTION -   Goal: No evidence of infection  Description  INTERVENTIONS:  - Instruct family/visitors to use good hand hygiene technique  - Identify and instruct in appropriate isolation precautions for identified infection/condition  - Change incubator every 2 weeks or as needed  - Monitor for symptoms of infection  - Monitor surgical sites and insertion sites for all indwelling lines, tubes, and drains for drainage, redness, or edema   - Monitor endotracheal and nasal secretions for changes in amount and color  - Monitor culture and CBC results  - Administer antibiotics as ordered  Monitor drug levels  Outcome: Progressing     Problem: RISK FOR INFECTION (RISK FACTORS FOR MATERNAL CHORIOAMNIOITIS - )  Goal: No evidence of infection  Description  INTERVENTIONS:  - Instruct family/visitors to use good hand hygiene technique  - Monitor for symptoms of infection  - Monitor culture and CBC results  - Administer antibiotics as ordered    Monitor drug levels  Outcome: Progressing     Problem: SAFETY -   Goal: Patient will remain free from falls  Description  INTERVENTIONS:  - Instruct family/caregiver on patient safety  - Keep incubator doors and portholes closed when unattended  - Keep radiant warmer side rails and crib rails up when unattended  - Based on caregiver fall risk screen, instruct family/caregiver to ask for assistance with transferring infant if caregiver noted to have fall risk factors  Outcome: Progressing     Problem: Knowledge Deficit  Goal: Patient/family/caregiver demonstrates understanding of disease process, treatment plan, medications, and discharge instructions  Description  Complete learning assessment and assess knowledge base    Interventions:  - Provide teaching at level of understanding  - Provide teaching via preferred learning methods  Outcome: Progressing  Goal: Infant caregiver verbalizes understanding of benefits of skin-to-skin with healthy   Description  Prior to delivery, educate patient regarding skin-to-skin practice and its benefits  Initiate immediate and uninterrupted skin-to-skin contact after birth until breastfeeding is initiated or a minimum of one hour  Encourage continued skin-to-skin contact throughout the post partum stay    Outcome: Progressing  Goal: Infant caregiver verbalizes understanding of benefits and management of breastfeeding their healthy   Description  Help initiate breastfeeding within one hour of birth  Educate/assist with breastfeeding positioning and latch  Educate on safe positioning and to monitor their  for safety  Educate on how to maintain lactation even if they are  from their   Educate/initiate pumping for a mom with a baby in the NICU within 6 hours after birth  Give infants no food or drink other than breast milk unless medically indicated  Educate on feeding cues and encourage breastfeeding on demand    Outcome: Progressing  Goal: Infant caregiver verbalizes understanding of benefits to rooming-in with their healthy   Description  Promote rooming in 21 out of 24 hours per day  Educate on benefits to rooming-in  Provide  care in room with parents as long as infant and mother condition allow    Outcome: Progressing  Goal: Provide formula feeding instructions and preparation information to caregivers who do not wish to breastfeed their   Description  Provide one on one information on frequency, amount, and burping for formula feeding caregivers throughout their stay and at discharge  Provide written information/video on formula preparation  Outcome: Progressing  Goal: Infant caregiver verbalizes understanding of support and resources for follow up after discharge  Description  Provide individual discharge education on when to call the doctor  Provide resources and contact information for post-discharge support      Outcome: Progressing     Problem: DISCHARGE PLANNING  Goal: Discharge to home or other facility with appropriate resources  Description  INTERVENTIONS:  - Identify barriers to discharge w/patient and caregiver  - Arrange for needed discharge resources and transportation as appropriate  - Identify discharge learning needs (meds, wound care, etc )  - Arrange for interpretive services to assist at discharge as needed  - Refer to Case Management Department for coordinating discharge planning if the patient needs post-hospital services based on physician/advanced practitioner order or complex needs related to functional status, cognitive ability, or social support system  Outcome: Progressing Yes

## 2020-02-17 NOTE — DISCHARGE NOTE ADULT - NS AS DC FU [facility] LV FUNCTION ASSESSMENT
Patient ID:  Diego Antunez  [id number]  1960 59 year old    Admit date: 2/14/2020    Discharge date and time:  2/16/2020     Admitting Physician: Vasyl Mancini MD     Discharge Physician: Augustin Xiong MD    Consultants:   IP Consult Orders (From admission, onward)     Start     Ordered    02/15/20 0826  Inpatient consult to General Surgery  ONE TIME     Provider:  Elizabeth Teresa MD    02/15/20 0826                Discharge Diagnoses:   Perineal abscess  Status post I&D on February 15, 2020.     Chronic Diagnoses:  Patient Active Problem List   Diagnosis   • Anxiety state, unspecified   • Essential hypertension   • Type II or unspecified type diabetes mellitus without mention of complication, not stated as uncontrolled   • Pure hypercholesterolemia   • Diverticulitis of colon   • Essential hypertension, benign   • Chronic low back pain   • Chronic back pain greater than 3 months duration   • Anxiety and depression   • Carpal tunnel syndrome of right wrist   • Pain of right lower extremity   • Blind left eye   • Corneal scar, left eye   • Anterior synechiae (iris), left eye   • Iridodialysis of left eye   • Insomnia   • Nonhealing surgical wound, subsequent encounter   • Perianal abscess         Hospital Course:   59-year-old male who was recently admitted to [facility] with perineal abscess status post I and D in the ER.  CT scan during previous visit did not show any fluid collection.  Patient was discharged home with p.o. Augmentin.  At home patient symptoms continued to worsen and continued to have worsening perineal pain.  Decided to come to ER due to worsening pain and inflammation.  During this visit patient did not have any fever chills or night sweats but had generalized weakness.  He had a repeat CT scan of the abdomen pelvis which showed complex fluid collection.  He was re-evaluated by surgical team during this hospital course and underwent through incision and drainage in the OR.   He was evaluated by surgical team on February 16, 2020 and cleared to be discharged home.  Patient will be discharged home with p.o. Augmentin for 7 more days with plan to outpatient follow-up with surgery[facility].  On the day of discharge patient was comfortable to be discharged home with family.      Discharge Medications:     Summary of your Discharge Medications      Take these Medications      Details   ALPRAZolam 1 MG tablet  Commonly known as:  XANAX   Take 1 tablet by mouth 2 times daily as needed for Anxiety.     amoxicillin-clavulanate 875-125 MG per tablet  Commonly known as:  AUGMENTIN   Take 1 tablet by mouth every 12 hours for 10 days. Take with food.     blood glucose test strip   Indications: diabetes Test blood sugar 1 time daily as directed. Diagnosis:E11.9 Meter: One touch ultra  Comment:  One Touch Ultra Blue test strips. Keep on file until needed please     carisoprodol 350 MG tablet  Commonly known as:  SOMA   Take 1 tablet by mouth 3 times daily as needed for Muscle spasms.     carvedilol 12.5 MG tablet  Commonly known as:  COREG   Take 1 tablet by mouth 2 times daily.     gabapentin 300 MG capsule  Commonly known as:  NEURONTIN   Take 2 capsules by mouth 3 times daily.     glipiZIDE 10 MG 24 hr tablet  Commonly known as:  GLUCOTROL ER   Take 1 tablet by mouth daily.     hydrochlorothiazide 25 MG tablet  Commonly known as:  HYDRODIURIL   Take 1 tablet by mouth daily.     ibuprofen 200 MG tablet  Commonly known as:  MOTRIN   Take 800 mg by mouth every 6 hours as needed for Pain.     losartan 100 MG tablet  Commonly known as:  COZAAR   Take 1 tablet by mouth daily.     metFORMIN 500 MG 24 hr tablet  Commonly known as:  GLUCOPHAGE-XR  Notes to patient:  Do not take metformin before 2/17.    Take 2 tablets by mouth 2 times daily.     onetouch ultrasoft Misc   Use to check blood glucose 3 times each day.  Diagnosis:  Diabetes mellitus (250.00)     pravastatin 40  MG tablet  Commonly known as:  PRAVACHOL   Take 1 tablet by mouth daily.     PROAIR  (90 Base) MCG/ACT inhaler   Generic drug:  albuterol  Inhale 2 puffs into the lungs every 4 hours as needed for Shortness of Breath or Wheezing.     traMADol 50 MG tablet  Commonly known as:  ULTRAM   Take 1-2 tablets by mouth every 6 hours as needed for Pain.     trazodone 150 MG tablet  Commonly known as:  DESYREL   Take 1 tablet by mouth nightly.     TYLENOL 325 MG tablet   Generic drug:  acetaminophen  Take 650 mg by mouth every 4 hours as needed.     vitamin D 50 mcg (2,000 units) capsule   Take 2,000 Units by mouth daily.              Discharge Labs:  Recent Labs   Lab 02/16/20  0532 02/15/20  2027 02/15/20  0421 02/14/20  2310   SODIUM 141  --  141 139   POTASSIUM 3.8 4.4 3.5 3.8   CHLORIDE 105  --  104 102   CO2 30  --  30 31   BUN 19  --  26* 23*   CREATININE 0.92  --  0.96 1.01   GLUCOSE 170*  --  152* 188*   WBC 10.4  --  14.0* 14.7*   HGB 10.8*  --  11.2* 12.5*   HCT 32.3*  --  33.8* 36.8*     --  211 222         Discharge Exam:   Blood pressure (!) 157/82, pulse 83, temperature 98 °F (36.7 °C), temperature source Temporal, resp. rate 16, height 5' 11\" (1.803 m), weight 113.3 kg, SpO2 96 %.  GEN: No acute distress.  HEART: S1, S2 auscultated.  LUNGS: Clear to ausculation bilaterally.  ABD: Soft, non-tender, active bowel sounds.  EXT: No edema.    Disposition: Home    Patient Instructions:   Activity: activity as tolerated  Diet: resume prior diet  Wound Care: keep wound clean and dry    Code status: Pt in hospital opted for Prior    Follow up/  Instructions:   Follow-up with general surgery in 1-2 days  Primary care physician in 1 week  Infectious Disease in 1 week  Wound care clinic as previously scheduled.    The above plan was discussed in detail with the patient.    More than 30 minutes spent on the care, counselling and discharge planning of the patient.    Signed:  Augustin Xiong MD  2/16/2020  6:49  PM       yes

## 2020-08-27 ENCOUNTER — EMERGENCY (EMERGENCY)
Facility: HOSPITAL | Age: 50
LOS: 1 days | Discharge: HOME | End: 2020-08-27
Attending: STUDENT IN AN ORGANIZED HEALTH CARE EDUCATION/TRAINING PROGRAM
Payer: MEDICARE

## 2020-08-27 VITALS
WEIGHT: 207.01 LBS | HEIGHT: 69 IN | TEMPERATURE: 98 F | DIASTOLIC BLOOD PRESSURE: 73 MMHG | OXYGEN SATURATION: 97 % | HEART RATE: 97 BPM | SYSTOLIC BLOOD PRESSURE: 128 MMHG | RESPIRATION RATE: 18 BRPM

## 2020-08-27 DIAGNOSIS — Z89.422 ACQUIRED ABSENCE OF OTHER LEFT TOE(S): Chronic | ICD-10-CM

## 2020-08-27 DIAGNOSIS — Z95.1 PRESENCE OF AORTOCORONARY BYPASS GRAFT: Chronic | ICD-10-CM

## 2020-08-27 DIAGNOSIS — Z95.810 PRESENCE OF AUTOMATIC (IMPLANTABLE) CARDIAC DEFIBRILLATOR: Chronic | ICD-10-CM

## 2020-08-27 PROCEDURE — 99282 EMERGENCY DEPT VISIT SF MDM: CPT

## 2020-08-27 NOTE — ED PROVIDER NOTE - OBJECTIVE STATEMENT
50 M w/ PMH of ESRD TThuSat, presents to the ED w/ pain in the LLE, w/ swelling of the lower limb along the tibia fibula no focal abrasions, unknown what caused incident reports that he was cleaning in basement today. Pt reports he has been ambulating and in the lower limb but became afraid because of the swelling

## 2020-08-27 NOTE — ED PROVIDER NOTE - PATIENT PORTAL LINK FT
You can access the FollowMyHealth Patient Portal offered by [facility] by registering at the following website: [email or web address]. By joining [facility] [employer]’s FollowMyHealth portal, you will also be able to view your health information using other applications (apps) compatible with our system.

## 2020-08-27 NOTE — ED ADULT NURSE NOTE - OBJECTIVE STATEMENT
51y/o male history of CAD, DM, CHF presents to the ED from home c/o of left lower leg swelling after hitting in on something, brusing/swelling noted but no active bleeding. Pt stated having dialysis this morning. Patient is Aox4, patent airway, clear lung sounds, soft non tender abdomen, strong peripheral pulses, no changes in bowel/bladder patterns. Patient denies fever, chills, n/v, weakness, abd pain, diarrhea/constipation, numbness/tingling, urinary s/s, in no respiratory distress, no chest pain, Patient safety provided with call bell within reach and bed in the lowest position.

## 2020-08-27 NOTE — ED PROVIDER NOTE - CLINICAL SUMMARY MEDICAL DECISION MAKING FREE TEXT BOX
brusing to the lateral aspect of the lower limb concerning for possible hematoma recommending ice, compression and dc home w/ outpt follow up.

## 2020-08-27 NOTE — ED ADULT NURSE NOTE - NSIMPLEMENTINTERV_GEN_ALL_ED
Implemented All Universal Safety Interventions:  [place] [employer] to call system. Call bell, personal items and telephone within reach. Instruct patient to call for assistance. Room bathroom lighting operational. Non-slip footwear when patient is off stretcher. Physically safe environment: no spills, clutter or unnecessary equipment. Stretcher in lowest position, wheels locked, appropriate side rails in place.

## 2020-08-27 NOTE — ED PROVIDER NOTE - NSFOLLOWUPINSTRUCTIONS_ED_ALL_ED_FT
We evaluated you in the emergency room and it appears that you have a hematoma    We recommend that you rest, ice and take tylenol(650 mg up to three times a day) for your symptoms.     After 48 hours please use heat on the area that is hurting.     If you still have persistent pain after 5-7 days after the injury please be re-evaluated as there could be a more severe diagnosis than just a hematoma. If you develop numbness, weakness, change in color of your extremities (turn red, blue or white), fevers, vomiting, worsening pain please seek immediate medical care for repeat evaluation.

## 2020-08-27 NOTE — ED PROVIDER NOTE - PHYSICAL EXAMINATION
I have reviewed the triage vital signs.  Const: Well-nourished, Well-developed, appearing stated age  ENMT: Atraumatic external nose and ears, Moist mucus membranes  Neck: Symmetric, trachea midline,  CVS: +S1/S2, dorsalis pedis 2+ bilaterally  RESP: Unlabored respiratory effort, Clear to auscultation bilaterally  GI: Nontender/Nondistended, soft abdomen  MSK: brusing on the lateral aspect of the LLE, and fistula in the LUE, intact flexion and extension in the bilateral ankles and knees  Psych: Awake, Alert, & Orientedx3;  Appropriate mood and affect, cooperative

## 2020-08-27 NOTE — ED PROVIDER NOTE - NS ED ROS FT
Constitutional: no fevers no chills.   CV: no chest pain, no palpitations   Respiratory: no shortness of breath, no cough   GI: no abdominal pain, no nausea no vomiting   Neuro: no headache, no weakness, no numbness

## 2020-08-28 PROBLEM — I50.23 ACUTE ON CHRONIC SYSTOLIC (CONGESTIVE) HEART FAILURE: Chronic | Status: ACTIVE | Noted: 2017-05-29

## 2021-01-21 NOTE — ED PROVIDER NOTE - CRTICAL CARE TIME SPENT (MIN)
Was seen on L&D 2 days ago for bleeding, has stopped except brown discharge with wiping  Good fetal movement  Discuss Tdap next visit   labor precautions  
30

## 2022-01-13 ENCOUNTER — NON-APPOINTMENT (OUTPATIENT)
Age: 52
End: 2022-01-13

## 2022-01-13 ENCOUNTER — INPATIENT (INPATIENT)
Facility: HOSPITAL | Age: 52
LOS: 5 days | Discharge: HOME | DRG: 616 | End: 2022-01-19
Attending: SURGERY | Admitting: INTERNAL MEDICINE
Payer: MEDICARE

## 2022-01-13 VITALS
DIASTOLIC BLOOD PRESSURE: 80 MMHG | SYSTOLIC BLOOD PRESSURE: 136 MMHG | HEIGHT: 69 IN | HEART RATE: 94 BPM | OXYGEN SATURATION: 98 % | RESPIRATION RATE: 16 BRPM | TEMPERATURE: 99 F | WEIGHT: 199.96 LBS

## 2022-01-13 DIAGNOSIS — Z89.422 ACQUIRED ABSENCE OF OTHER LEFT TOE(S): Chronic | ICD-10-CM

## 2022-01-13 DIAGNOSIS — E11.621 TYPE 2 DIABETES MELLITUS WITH FOOT ULCER: ICD-10-CM

## 2022-01-13 DIAGNOSIS — Z95.810 PRESENCE OF AUTOMATIC (IMPLANTABLE) CARDIAC DEFIBRILLATOR: Chronic | ICD-10-CM

## 2022-01-13 DIAGNOSIS — Z95.1 PRESENCE OF AORTOCORONARY BYPASS GRAFT: Chronic | ICD-10-CM

## 2022-01-13 LAB
ALBUMIN SERPL ELPH-MCNC: 4.4 G/DL — SIGNIFICANT CHANGE UP (ref 3.3–5)
ALP SERPL-CCNC: 112 U/L — SIGNIFICANT CHANGE UP (ref 40–120)
ALT FLD-CCNC: 12 U/L — SIGNIFICANT CHANGE UP (ref 10–45)
ANION GAP SERPL CALC-SCNC: 14 MMOL/L — SIGNIFICANT CHANGE UP (ref 5–17)
APTT BLD: 34.1 SEC — SIGNIFICANT CHANGE UP (ref 27.5–35.5)
AST SERPL-CCNC: 11 U/L — SIGNIFICANT CHANGE UP (ref 10–40)
BASOPHILS # BLD AUTO: 0.03 K/UL — SIGNIFICANT CHANGE UP (ref 0–0.2)
BASOPHILS NFR BLD AUTO: 0.5 % — SIGNIFICANT CHANGE UP (ref 0–2)
BILIRUB SERPL-MCNC: 0.4 MG/DL — SIGNIFICANT CHANGE UP (ref 0.2–1.2)
BUN SERPL-MCNC: 24 MG/DL — HIGH (ref 7–23)
CALCIUM SERPL-MCNC: 9.9 MG/DL — SIGNIFICANT CHANGE UP (ref 8.4–10.5)
CHLORIDE SERPL-SCNC: 93 MMOL/L — LOW (ref 96–108)
CK SERPL-CCNC: 39 U/L — SIGNIFICANT CHANGE UP (ref 30–200)
CO2 SERPL-SCNC: 30 MMOL/L — SIGNIFICANT CHANGE UP (ref 22–31)
CREAT SERPL-MCNC: 4.74 MG/DL — HIGH (ref 0.5–1.3)
EOSINOPHIL # BLD AUTO: 0 K/UL — SIGNIFICANT CHANGE UP (ref 0–0.5)
EOSINOPHIL NFR BLD AUTO: 0 % — SIGNIFICANT CHANGE UP (ref 0–6)
GAS PNL BLDV: SIGNIFICANT CHANGE UP
GLUCOSE BLDC GLUCOMTR-MCNC: 140 MG/DL — HIGH (ref 70–99)
GLUCOSE BLDC GLUCOMTR-MCNC: 212 MG/DL — HIGH (ref 70–99)
GLUCOSE SERPL-MCNC: 208 MG/DL — HIGH (ref 70–99)
HCT VFR BLD CALC: 36.8 % — LOW (ref 39–50)
HGB BLD-MCNC: 11.2 G/DL — LOW (ref 13–17)
IMM GRANULOCYTES NFR BLD AUTO: 0.5 % — SIGNIFICANT CHANGE UP (ref 0–1.5)
INR BLD: 1.12 RATIO — SIGNIFICANT CHANGE UP (ref 0.88–1.16)
LYMPHOCYTES # BLD AUTO: 0.95 K/UL — LOW (ref 1–3.3)
LYMPHOCYTES # BLD AUTO: 15.9 % — SIGNIFICANT CHANGE UP (ref 13–44)
MCHC RBC-ENTMCNC: 29.2 PG — SIGNIFICANT CHANGE UP (ref 27–34)
MCHC RBC-ENTMCNC: 30.4 GM/DL — LOW (ref 32–36)
MCV RBC AUTO: 95.8 FL — SIGNIFICANT CHANGE UP (ref 80–100)
MONOCYTES # BLD AUTO: 0.58 K/UL — SIGNIFICANT CHANGE UP (ref 0–0.9)
MONOCYTES NFR BLD AUTO: 9.7 % — SIGNIFICANT CHANGE UP (ref 2–14)
NEUTROPHILS # BLD AUTO: 4.39 K/UL — SIGNIFICANT CHANGE UP (ref 1.8–7.4)
NEUTROPHILS NFR BLD AUTO: 73.4 % — SIGNIFICANT CHANGE UP (ref 43–77)
NRBC # BLD: 0 /100 WBCS — SIGNIFICANT CHANGE UP (ref 0–0)
PLATELET # BLD AUTO: 209 K/UL — SIGNIFICANT CHANGE UP (ref 150–400)
POTASSIUM SERPL-MCNC: 3.9 MMOL/L — SIGNIFICANT CHANGE UP (ref 3.5–5.3)
POTASSIUM SERPL-SCNC: 3.9 MMOL/L — SIGNIFICANT CHANGE UP (ref 3.5–5.3)
PROT SERPL-MCNC: 8.4 G/DL — HIGH (ref 6–8.3)
PROTHROM AB SERPL-ACNC: 13.4 SEC — SIGNIFICANT CHANGE UP (ref 10.6–13.6)
RBC # BLD: 3.84 M/UL — LOW (ref 4.2–5.8)
RBC # FLD: 18.6 % — HIGH (ref 10.3–14.5)
SODIUM SERPL-SCNC: 137 MMOL/L — SIGNIFICANT CHANGE UP (ref 135–145)
WBC # BLD: 5.98 K/UL — SIGNIFICANT CHANGE UP (ref 3.8–10.5)
WBC # FLD AUTO: 5.98 K/UL — SIGNIFICANT CHANGE UP (ref 3.8–10.5)

## 2022-01-13 PROCEDURE — 73630 X-RAY EXAM OF FOOT: CPT | Mod: 26,RT

## 2022-01-13 PROCEDURE — 93010 ELECTROCARDIOGRAM REPORT: CPT | Mod: GC

## 2022-01-13 PROCEDURE — 99222 1ST HOSP IP/OBS MODERATE 55: CPT

## 2022-01-13 PROCEDURE — 99285 EMERGENCY DEPT VISIT HI MDM: CPT | Mod: 25,GC

## 2022-01-13 RX ORDER — INSULIN LISPRO 100/ML
VIAL (ML) SUBCUTANEOUS
Refills: 0 | Status: DISCONTINUED | OUTPATIENT
Start: 2022-01-13 | End: 2022-01-17

## 2022-01-13 RX ORDER — PIPERACILLIN AND TAZOBACTAM 4; .5 G/20ML; G/20ML
3.38 INJECTION, POWDER, LYOPHILIZED, FOR SOLUTION INTRAVENOUS EVERY 12 HOURS
Refills: 0 | Status: DISCONTINUED | OUTPATIENT
Start: 2022-01-14 | End: 2022-01-17

## 2022-01-13 RX ORDER — DEXTROSE 50 % IN WATER 50 %
25 SYRINGE (ML) INTRAVENOUS ONCE
Refills: 0 | Status: DISCONTINUED | OUTPATIENT
Start: 2022-01-13 | End: 2022-01-17

## 2022-01-13 RX ORDER — MIDODRINE HYDROCHLORIDE 2.5 MG/1
5 TABLET ORAL
Refills: 0 | Status: DISCONTINUED | OUTPATIENT
Start: 2022-01-13 | End: 2022-01-17

## 2022-01-13 RX ORDER — DEXTROSE 50 % IN WATER 50 %
15 SYRINGE (ML) INTRAVENOUS ONCE
Refills: 0 | Status: DISCONTINUED | OUTPATIENT
Start: 2022-01-13 | End: 2022-01-17

## 2022-01-13 RX ORDER — HEPARIN SODIUM 5000 [USP'U]/ML
5000 INJECTION INTRAVENOUS; SUBCUTANEOUS EVERY 8 HOURS
Refills: 0 | Status: DISCONTINUED | OUTPATIENT
Start: 2022-01-13 | End: 2022-01-17

## 2022-01-13 RX ORDER — VANCOMYCIN HCL 1 G
1000 VIAL (EA) INTRAVENOUS ONCE
Refills: 0 | Status: COMPLETED | OUTPATIENT
Start: 2022-01-13 | End: 2022-01-14

## 2022-01-13 RX ORDER — ATORVASTATIN CALCIUM 80 MG/1
40 TABLET, FILM COATED ORAL AT BEDTIME
Refills: 0 | Status: DISCONTINUED | OUTPATIENT
Start: 2022-01-13 | End: 2022-01-17

## 2022-01-13 RX ORDER — SODIUM CHLORIDE 9 MG/ML
1000 INJECTION, SOLUTION INTRAVENOUS
Refills: 0 | Status: DISCONTINUED | OUTPATIENT
Start: 2022-01-13 | End: 2022-01-17

## 2022-01-13 RX ORDER — LISINOPRIL 2.5 MG/1
2.5 TABLET ORAL DAILY
Refills: 0 | Status: DISCONTINUED | OUTPATIENT
Start: 2022-01-13 | End: 2022-01-17

## 2022-01-13 RX ORDER — GLUCAGON INJECTION, SOLUTION 0.5 MG/.1ML
1 INJECTION, SOLUTION SUBCUTANEOUS ONCE
Refills: 0 | Status: DISCONTINUED | OUTPATIENT
Start: 2022-01-13 | End: 2022-01-17

## 2022-01-13 RX ORDER — ASPIRIN/CALCIUM CARB/MAGNESIUM 324 MG
81 TABLET ORAL DAILY
Refills: 0 | Status: DISCONTINUED | OUTPATIENT
Start: 2022-01-13 | End: 2022-01-14

## 2022-01-13 RX ORDER — CARVEDILOL PHOSPHATE 80 MG/1
6.25 CAPSULE, EXTENDED RELEASE ORAL EVERY 12 HOURS
Refills: 0 | Status: DISCONTINUED | OUTPATIENT
Start: 2022-01-13 | End: 2022-01-17

## 2022-01-13 RX ORDER — INSULIN GLARGINE 100 [IU]/ML
10 INJECTION, SOLUTION SUBCUTANEOUS AT BEDTIME
Refills: 0 | Status: DISCONTINUED | OUTPATIENT
Start: 2022-01-13 | End: 2022-01-17

## 2022-01-13 RX ORDER — PIPERACILLIN AND TAZOBACTAM 4; .5 G/20ML; G/20ML
3.38 INJECTION, POWDER, LYOPHILIZED, FOR SOLUTION INTRAVENOUS ONCE
Refills: 0 | Status: COMPLETED | OUTPATIENT
Start: 2022-01-13 | End: 2022-01-13

## 2022-01-13 RX ORDER — DEXTROSE 50 % IN WATER 50 %
12.5 SYRINGE (ML) INTRAVENOUS ONCE
Refills: 0 | Status: DISCONTINUED | OUTPATIENT
Start: 2022-01-13 | End: 2022-01-17

## 2022-01-13 RX ADMIN — ATORVASTATIN CALCIUM 40 MILLIGRAM(S): 80 TABLET, FILM COATED ORAL at 22:25

## 2022-01-13 RX ADMIN — Medication 81 MILLIGRAM(S): at 22:25

## 2022-01-13 RX ADMIN — PIPERACILLIN AND TAZOBACTAM 200 GRAM(S): 4; .5 INJECTION, POWDER, LYOPHILIZED, FOR SOLUTION INTRAVENOUS at 23:25

## 2022-01-13 RX ADMIN — HEPARIN SODIUM 5000 UNIT(S): 5000 INJECTION INTRAVENOUS; SUBCUTANEOUS at 22:27

## 2022-01-13 NOTE — ED PROVIDER NOTE - NS ED ROS FT
Constitutional:  See HPI  ENMT: No neck pain or stiffness  Cardiac:  No chest pain  Respiratory:  No cough or respiratory distress.   GI:  No nausea, vomiting, diarrhea or abdominal pain.  MS:  R 1st toe ulcer ; no pain as pt has no sensation; no other ulcers  Neuro:  No headache   Skin:  No skin rash  Except as documented in the HPI,  all other systems are negative

## 2022-01-13 NOTE — CONSULT NOTE ADULT - EXTREMITIES COMMENTS
mod art insuff w mod trophic skin changes  rt toe 1 wound  probing to bone  left toe 5 met head resection wound well healed

## 2022-01-13 NOTE — H&P ADULT - HISTORY OF PRESENT ILLNESS
50yo M with PMH of DM, ESRD TTS presents to ED from Dr Myers podiatry office for eval of R 1st toe gangrene.   Started with ulcer 3 wks ago, initially placed on abx for last 10d which he completed. Today went to podiatry and found that ulcer was still open and when probed, went to bone. Painless as pt has no sensation in toes due to diabetes. No fever but did have chills. No streaking up leg. Had previous bone removal in L 5th toe 2/2 OM. Otherwise feels well. No CP, SOB, cough, abd pain,fever, chills

## 2022-01-13 NOTE — ED PROVIDER NOTE - NSICDXPASTMEDICALHX_GEN_ALL_CORE_FT
PAST MEDICAL HISTORY:  Acute on chronic systolic heart failure     Coronary artery disease     Diabetes mellitus type 2    Foot ulcer due to secondary DM

## 2022-01-13 NOTE — CONSULT NOTE ADULT - SUBJECTIVE AND OBJECTIVE BOX
VASCULAR SURGERY CONSULT NOTE  ROSA CONDE  |  [id number]  |  01-13-22 @ 18:32    Patient was seen and examined at: 19:00    CC: Patient is a 51y old  Male who presents with a chief complaint of R foot hallux wound to bone  HPI:  50yo M with PMH of DM, ESRD TTS presents to ED from Dr Myers podiatry office for eval of R 1st toe gangrene.   Started with ulcer 3 wks ago, initially placed on abx for last 10d which he completed. Today went to podiatry and found that ulcer was still open and when probed, went to bone. Painless as pt has no sensation in toes due to diabetes. No fever but did have chills. No streaking up leg. Had previous bone removal in L 5th toe 2/2 OM. Otherwise feels well. No CP, SOB, cough, abd pain,fever, chills (13 Jan 2022 20:10)    Vascular Surgery consulted for non-healing wound of R hallux that probes to bone. Patient reports acute onset of swelling first noticed a few weeks ago, no associated pain, paresthesia, weakness. He presented to Podiatrist who took XR that were negative, placed on oral antibiotics. He continued to have symptoms and presented again to podiatry today who found wound was open, draining pus and probing to bone, thus sent to ED. Patient denies trauma to foot, unclear why swelling happened in the beginning. Has h/o L 5th toe OM that led to bone removal. Ambulates at baseline without assistance. Endorses neuropathic pain. Denies f/c, CP/SOB.     REVIEW OF SYSTEMS:  General: denies weight change, fever or fatigue  HEENT: denies sore throat, hoarseness  Respiratory: denies cough, shortness of breath at rest and on exertion, wheezing  Cardiovascular: denies chest pain, abnormal heart rhythm, PND, palpitations  Gastrointestinal: denies nausea, vomiting, diarrhea, bloody or black bowel movements  Genitourinary: denies frequent urination, painful urination, kidney disease  Neurological: denies seizures, headaches  Muscoloskeletal: denies any joint pains  Psychiatric: denies depression, anxiety    PAST MEDICAL & SURGICAL HISTORY:  Diabetes mellitus  type 2    Foot ulcer due to secondary DM    Coronary artery disease    Acute on chronic systolic heart failure    Breast Reduction  at age 18    Toe amputation status, left    S/P CABG (coronary artery bypass graft)    AICD (automatic cardioverter/defibrillator) present      MEDICATIONS  (STANDING):  aspirin  chewable 81 milliGRAM(s) Oral daily  atorvastatin 40 milliGRAM(s) Oral at bedtime  carvedilol 6.25 milliGRAM(s) Oral every 12 hours  dextrose 40% Gel 15 Gram(s) Oral once  dextrose 5%. 1000 milliLiter(s) (50 mL/Hr) IV Continuous <Continuous>  dextrose 5%. 1000 milliLiter(s) (100 mL/Hr) IV Continuous <Continuous>  dextrose 50% Injectable 25 Gram(s) IV Push once  dextrose 50% Injectable 12.5 Gram(s) IV Push once  dextrose 50% Injectable 25 Gram(s) IV Push once  glucagon  Injectable 1 milliGRAM(s) IntraMuscular once  heparin   Injectable 5000 Unit(s) SubCutaneous every 8 hours  insulin glargine Injectable (LANTUS) 10 Unit(s) SubCutaneous at bedtime  insulin lispro (ADMELOG) corrective regimen sliding scale   SubCutaneous three times a day before meals  lisinopril 2.5 milliGRAM(s) Oral daily  midodrine. 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    Occupation:  Smoking Hx: denies  Etoh Hx: denies  IVDA Hx: denies    FAMILY HISTORY:  Family history of diabetes mellitus (Father)  - Unless noted, no significant family hx with Mother, Father, Siblings    Objective:   Vital Signs Last 24 Hrs  T(C): 36.4 (13 Jan 2022 16:56), Max: 37.1 (13 Jan 2022 12:54)  T(F): 97.5 (13 Jan 2022 16:56), Max: 98.7 (13 Jan 2022 12:54)  HR: 80 (13 Jan 2022 16:56) (80 - 94)  BP: 121/73 (13 Jan 2022 16:56) (121/73 - 136/80)  BP(mean): --  RR: 16 (13 Jan 2022 16:56) (16 - 16)  SpO2: 97% (13 Jan 2022 16:56) (97% - 98%)  CAPILLARY BLOOD GLUCOSE      POCT Blood Glucose.: 212 mg/dL (13 Jan 2022 19:35)      Physical Exam:  General: alert and cooperative, and appears to be in no acute distress.  Chest: non-labored breathing  Cardiac: Regular rhythm, rate  Extremities: All 4 extremities warm. B/l palpable fem and pop. B/l dopplerable DP/PT  Neurological: no focal deficits, strength and sensation symmetric and intact throughout  Skin: Right foot hallux dressing c/d/i    LABS:                        11.2   5.98  )-----------( 209      ( 13 Jan 2022 18:45 )             36.8     01-13    137  |  93<L>  |  24<H>  ----------------------------<  208<H>  3.9   |  30  |  4.74<H>    Ca    9.9      13 Jan 2022 18:45    TPro  8.4<H>  /  Alb  4.4  /  TBili  0.4  /  DBili  x   /  AST  11  /  ALT  12  /  AlkPhos  112  01-13    PT/INR - ( 13 Jan 2022 18:45 )   PT: 13.4 sec;   INR: 1.12 ratio         PTT - ( 13 Jan 2022 18:45 )  PTT:34.1 sec  LIVER FUNCTIONS - ( 13 Jan 2022 18:45 )  Alb: 4.4 g/dL / Pro: 8.4 g/dL / ALK PHOS: 112 U/L / ALT: 12 U/L / AST: 11 U/L / GGT: x               CARDIAC MARKERS ( 13 Jan 2022 18:45 )  x     / x     / 39 U/L / x     / 2.8 ng/mL  High Sensitivity Troponin:x      Serum Pro-BNP: x      ----------    RADIOLOGY & ADDITIONAL STUDIES:     VASCULAR SURGERY CONSULT NOTE  ROSA CONDE  |  [id number]  |  01-13-22 @ 18:32    Patient was seen and examined at: 19:00    CC: Patient is a 51y old  Male who presents with a chief complaint of R foot hallux wound to bone  HPI: 50yo M with PMH of DM, ESRD TTS presents to ED from Dr Myers podiatry office for eval of R 1st toe gangrene.   Started with ulcer 3 wks ago, initially placed on abx for last 10d which he completed. Today went to podiatry and found that ulcer was still open and when probed, went to bone. Painless as pt has no sensation in toes due to diabetes. No fever but did have chills. No streaking up leg. Had previous bone removal in L 5th toe 2/2 OM. Otherwise feels well. No CP, SOB, cough, abd pain,fever, chills (13 Jan 2022 20:10)    Vascular Surgery consulted for non-healing wound of R hallux that probes to bone. Patient reports acute onset of swelling first noticed a few weeks ago, no associated pain, paresthesia, weakness. He presented to Podiatrist who took XR that were negative, placed on oral antibiotics. He continued to have symptoms and presented again to podiatry today who found wound was open, draining pus and probing to bone, thus sent to ED. Patient denies trauma to foot, unclear why swelling happened in the beginning. Has h/o L 5th toe OM that led to bone removal. Ambulates at baseline without assistance. Endorses neuropathic pain. Underwent HD session earlier today. Denies f/c, CP/SOB.     REVIEW OF SYSTEMS:  General: denies weight change, fever or fatigue  HEENT: denies sore throat, hoarseness  Respiratory: denies cough, shortness of breath at rest and on exertion, wheezing  Cardiovascular: denies chest pain, abnormal heart rhythm, PND, palpitations  Gastrointestinal: denies nausea, vomiting, diarrhea, bloody or black bowel movements  Genitourinary: denies frequent urination, painful urination, kidney disease  Neurological: denies seizures, headaches  Muscoloskeletal: denies any joint pains  Psychiatric: denies depression, anxiety    PAST MEDICAL & SURGICAL HISTORY:  Diabetes mellitus  type 2    Foot ulcer due to secondary DM    Coronary artery disease    Acute on chronic systolic heart failure    Breast Reduction  at age 18    Toe amputation status, left    S/P CABG (coronary artery bypass graft)    AICD (automatic cardioverter/defibrillator) present      MEDICATIONS  (STANDING):  aspirin  chewable 81 milliGRAM(s) Oral daily  atorvastatin 40 milliGRAM(s) Oral at bedtime  carvedilol 6.25 milliGRAM(s) Oral every 12 hours  dextrose 40% Gel 15 Gram(s) Oral once  dextrose 5%. 1000 milliLiter(s) (50 mL/Hr) IV Continuous <Continuous>  dextrose 5%. 1000 milliLiter(s) (100 mL/Hr) IV Continuous <Continuous>  dextrose 50% Injectable 25 Gram(s) IV Push once  dextrose 50% Injectable 12.5 Gram(s) IV Push once  dextrose 50% Injectable 25 Gram(s) IV Push once  glucagon  Injectable 1 milliGRAM(s) IntraMuscular once  heparin   Injectable 5000 Unit(s) SubCutaneous every 8 hours  insulin glargine Injectable (LANTUS) 10 Unit(s) SubCutaneous at bedtime  insulin lispro (ADMELOG) corrective regimen sliding scale   SubCutaneous three times a day before meals  lisinopril 2.5 milliGRAM(s) Oral daily  midodrine. 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    Occupation:  Smoking Hx: denies  Etoh Hx: denies  IVDA Hx: denies    FAMILY HISTORY:  Family history of diabetes mellitus (Father)  - Unless noted, no significant family hx with Mother, Father, Siblings    Objective:   Vital Signs Last 24 Hrs  T(C): 36.4 (13 Jan 2022 16:56), Max: 37.1 (13 Jan 2022 12:54)  T(F): 97.5 (13 Jan 2022 16:56), Max: 98.7 (13 Jan 2022 12:54)  HR: 80 (13 Jan 2022 16:56) (80 - 94)  BP: 121/73 (13 Jan 2022 16:56) (121/73 - 136/80)  BP(mean): --  RR: 16 (13 Jan 2022 16:56) (16 - 16)  SpO2: 97% (13 Jan 2022 16:56) (97% - 98%)  CAPILLARY BLOOD GLUCOSE      POCT Blood Glucose.: 212 mg/dL (13 Jan 2022 19:35)      Physical Exam:  General: alert and cooperative, and appears to be in no acute distress.  Chest: non-labored breathing  Cardiac: Regular rhythm, rate  Extremities: All 4 extremities warm. B/l palpable fem and pop. B/l dopplerable DP/PT  Neurological: no focal deficits, strength and sensation symmetric and intact throughout  Skin: Right foot hallux dressing c/d/i    LABS:                        11.2   5.98  )-----------( 209      ( 13 Jan 2022 18:45 )             36.8     01-13    137  |  93<L>  |  24<H>  ----------------------------<  208<H>  3.9   |  30  |  4.74<H>    Ca    9.9      13 Jan 2022 18:45    TPro  8.4<H>  /  Alb  4.4  /  TBili  0.4  /  DBili  x   /  AST  11  /  ALT  12  /  AlkPhos  112  01-13    PT/INR - ( 13 Jan 2022 18:45 )   PT: 13.4 sec;   INR: 1.12 ratio         PTT - ( 13 Jan 2022 18:45 )  PTT:34.1 sec  LIVER FUNCTIONS - ( 13 Jan 2022 18:45 )  Alb: 4.4 g/dL / Pro: 8.4 g/dL / ALK PHOS: 112 U/L / ALT: 12 U/L / AST: 11 U/L / GGT: x               CARDIAC MARKERS ( 13 Jan 2022 18:45 )  x     / x     / 39 U/L / x     / 2.8 ng/mL  High Sensitivity Troponin:x      Serum Pro-BNP: x      ----------    RADIOLOGY & ADDITIONAL STUDIES:     VASCULAR SURGERY CONSULT NOTE  ROSA CONDE  |  [id number]  |  01-13-22 @ 18:32    Patient was seen and examined at: 19:00    CC: Patient is a 51y old  Male who presents with a chief complaint of R foot hallux wound to bone  HPI: 50yo M with PMH of DM, ESRD TTS presents to ED from Dr Myers podiatry office for eval of R 1st toe gangrene.   Started with ulcer 3 wks ago, initially placed on abx for last 10d which he completed. Today went to podiatry and found that ulcer was still open and when probed, went to bone. Painless as pt has no sensation in toes due to diabetes. No fever but did have chills. No streaking up leg. Had previous bone removal in L 5th toe 2/2 OM. Otherwise feels well. No CP, SOB, cough, abd pain,fever, chills (13 Jan 2022 20:10)    Vascular Surgery consulted for non-healing wound of R hallux that probes to bone. Patient reports acute onset of swelling first noticed a few weeks ago, no associated pain, paresthesia, weakness. He presented to Podiatrist who took XR that were negative, placed on oral antibiotics. He continued to have symptoms and presented again to podiatry today who found wound was open, draining pus and probing to bone, thus sent to ED. Patient denies trauma to foot, unclear why swelling happened in the beginning. Has h/o L 5th toe OM that led to bone removal. Ambulates at baseline without assistance. Endorses neuropathic pain. Underwent HD session earlier today. Denies f/c, CP/SOB.     VASCULAR SURG ATT ADDENDUM  Pt denies intermittent claudication or nocturnal leg or foot cramps     REVIEW OF SYSTEMS:  General: denies weight change, fever or fatigue  HEENT: denies sore throat, hoarseness  Respiratory: denies cough, shortness of breath at rest and on exertion, wheezing  Cardiovascular: denies chest pain, abnormal heart rhythm, PND, palpitations  Gastrointestinal: denies nausea, vomiting, diarrhea, bloody or black bowel movements  Genitourinary: denies frequent urination, painful urination, kidney disease  Neurological: denies seizures, headaches  Muscoloskeletal: denies any joint pains  Psychiatric: denies depression, anxiety    PAST MEDICAL & SURGICAL HISTORY:  Diabetes mellitus  type 2    Foot ulcer due to secondary DM    Coronary artery disease    Acute on chronic systolic heart failure    Breast Reduction  at age 18    Toe amputation status, left    S/P CABG (coronary artery bypass graft)    AICD (automatic cardioverter/defibrillator) present      MEDICATIONS  (STANDING):  aspirin  chewable 81 milliGRAM(s) Oral daily  atorvastatin 40 milliGRAM(s) Oral at bedtime  carvedilol 6.25 milliGRAM(s) Oral every 12 hours  dextrose 40% Gel 15 Gram(s) Oral once  dextrose 5%. 1000 milliLiter(s) (50 mL/Hr) IV Continuous <Continuous>  dextrose 5%. 1000 milliLiter(s) (100 mL/Hr) IV Continuous <Continuous>  dextrose 50% Injectable 25 Gram(s) IV Push once  dextrose 50% Injectable 12.5 Gram(s) IV Push once  dextrose 50% Injectable 25 Gram(s) IV Push once  glucagon  Injectable 1 milliGRAM(s) IntraMuscular once  heparin   Injectable 5000 Unit(s) SubCutaneous every 8 hours  insulin glargine Injectable (LANTUS) 10 Unit(s) SubCutaneous at bedtime  insulin lispro (ADMELOG) corrective regimen sliding scale   SubCutaneous three times a day before meals  lisinopril 2.5 milliGRAM(s) Oral daily  midodrine. 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    Occupation:  Smoking Hx: denies  Etoh Hx: denies  IVDA Hx: denies    FAMILY HISTORY:  Family history of diabetes mellitus (Father)  - Unless noted, no significant family hx with Mother, Father, Siblings    Objective:   Vital Signs Last 24 Hrs  T(C): 36.4 (13 Jan 2022 16:56), Max: 37.1 (13 Jan 2022 12:54)  T(F): 97.5 (13 Jan 2022 16:56), Max: 98.7 (13 Jan 2022 12:54)  HR: 80 (13 Jan 2022 16:56) (80 - 94)  BP: 121/73 (13 Jan 2022 16:56) (121/73 - 136/80)  BP(mean): --  RR: 16 (13 Jan 2022 16:56) (16 - 16)  SpO2: 97% (13 Jan 2022 16:56) (97% - 98%)  CAPILLARY BLOOD GLUCOSE      POCT Blood Glucose.: 212 mg/dL (13 Jan 2022 19:35)      Physical Exam:  General: alert and cooperative, and appears to be in no acute distress.  Chest: non-labored breathing  Cardiac: Regular rhythm, rate  Extremities: All 4 extremities warm. B/l palpable fem and pop. B/l dopplerable DP/PT  Neurological: no focal deficits, strength and sensation symmetric and intact throughout  Skin: Right foot hallux dressing c/d/i    LABS:                        11.2   5.98  )-----------( 209      ( 13 Jan 2022 18:45 )             36.8     01-13    137  |  93<L>  |  24<H>  ----------------------------<  208<H>  3.9   |  30  |  4.74<H>    Ca    9.9      13 Jan 2022 18:45    TPro  8.4<H>  /  Alb  4.4  /  TBili  0.4  /  DBili  x   /  AST  11  /  ALT  12  /  AlkPhos  112  01-13    PT/INR - ( 13 Jan 2022 18:45 )   PT: 13.4 sec;   INR: 1.12 ratio         PTT - ( 13 Jan 2022 18:45 )  PTT:34.1 sec  LIVER FUNCTIONS - ( 13 Jan 2022 18:45 )  Alb: 4.4 g/dL / Pro: 8.4 g/dL / ALK PHOS: 112 U/L / ALT: 12 U/L / AST: 11 U/L / GGT: x               CARDIAC MARKERS ( 13 Jan 2022 18:45 )  x     / x     / 39 U/L / x     / 2.8 ng/mL  High Sensitivity Troponin:x      Serum Pro-BNP: x      ----------

## 2022-01-13 NOTE — ED ADULT TRIAGE NOTE - CHIEF COMPLAINT QUOTE
right big toe infection  sent in by Podiatry for possible toe amputation  "infection goes to the bone"

## 2022-01-13 NOTE — CHART NOTE - NSCHARTNOTEFT_GEN_A_CORE
Chart reviewed.  Full ID consult to follow tomorrow.  Pt seen by podiatry.  Wound (+) probe to bone, cultures takes. Planned for OR.  Recommend coverage to with broad spectrum abx pending culture data.  Start vanco/zosyn.  Discussed case with Medical attending.    Symone Freire  [phone]

## 2022-01-13 NOTE — ED PROVIDER NOTE - CLINICAL SUMMARY MEDICAL DECISION MAKING FREE TEXT BOX
joel pgy1: 50yo M with DM ESRD presents for R 1st toe infection likely OM possibly needing amputation without signs/sxs initially of sepsis. Check labs, cultures, ESR CRP. Patient EKG shows new TWI but last EKG in system 2017, no chest pain. Check trop. need admit. Pod /vasc/ID to see. 52yo M with DM ESRD presents for R 1st toe infection likely OM possibly needing amputation without signs/sxs initially of sepsis. Check labs, cultures, ESR CRP. Patient EKG shows new TWI but last EKG in system 2017, no chest pain. Check trop. need admit. Pod /vasc/ID to see. ZR

## 2022-01-13 NOTE — CONSULT NOTE ADULT - ASSESSMENT
52yo M w/ right foot hallux wound to bone  - pt seen and evaluated  - afebrile, WBC 5.98, ESR pending, CRP 1.1  - Right foot hallux nailbed wound probing to bone, erythema to MPJ, dactylitis of hallux, no purulence, no malodor, no tracking, no fluctuance   - Right foot xray ordered r/o OM   - Right foot wound culture taken  - Pt seen w/ Dr. Malone at bedside, [facility] recs appreciated  - Recommend nephro consult  - Recommend admission to medicine   - Pod plan for right foot partial 1st ray resection vs. partial hallux amputation pending imaging results  - please document medical optimization for surgery under light sedation w/ local anesthesia   - discussed w/ attending

## 2022-01-13 NOTE — ED ADULT NURSE NOTE - OBJECTIVE STATEMENT
patient referred to ED by podiatrist for R toe infection, patient reports no pain to toe, denies any fevers chills.

## 2022-01-13 NOTE — ED ADULT NURSE NOTE - NSFALLRSKUNASSIST_ED_ALL_ED
Summary:    Patient is due/failing the following:   PAP and PHQ9    Action needed:   Patient needs office visit for PAP. and Patient needs to do PHQ9.    Type of outreach:    Sent letter.  Phone number listed no longer in service   Questions for provider review:    None                                                                                                                                    Tresa Kelly       Chart routed to Care Team .          Panel Management Review      Patient has the following on her problem list:     Depression / Dysthymia review  PHQ-9 SCORE 4/3/2014 10/30/2014 12/15/2014   Total Score 10 15 5      Patient is due for:  PHQ9 and DAP      Composite cancer screening  Chart review shows that this patient is due/due soon for the following Pap Smear     no

## 2022-01-13 NOTE — ED PROVIDER NOTE - NSICDXPASTSURGICALHX_GEN_ALL_CORE_FT
PAST SURGICAL HISTORY:  AICD (automatic cardioverter/defibrillator) present     Breast Reduction at age 18    S/P CABG (coronary artery bypass graft)     Toe amputation status, left

## 2022-01-13 NOTE — H&P ADULT - ASSESSMENT
52yo M w/ h/o DM, ESRD on HD, DIabetic neuropathy, doming in w diabetic foot ulcer with OM of right foot hallux wound to bone  - pt seen and evaluated by podiatry  - ptn is WBC 5.98, ESR pending, CRP 1.1  - Right foot hallux nailbed wound probing to bone, erythema to MPJ, dactylitis of hallux, no purulence, no malodor, no tracking, no fluctuance   - Right foot xray ordered r/o OM   - Right foot wound culture taken by podiatry  - Pt seen by vascular as well:  Dr. Malone at bedside  - house renal called  - card consult, may need clearance for OR  - Pod plan for right foot partial 1st ray resection vs. partial hallux amputation pending imaging results  - ptn is medically cleared  for surgery under light sedation w/ local anesthesia   - dvt ppx w [facility]  - cont outptn meds

## 2022-01-13 NOTE — ED PROVIDER NOTE - PROGRESS NOTE DETAILS
joel pgy1: podiatry paged and are aware and will see pt.    Vascular Dr Malone and ID Dr Boyd - teams already aware. ID saw in WR and called [facility] sx resident and they will see pt.

## 2022-01-13 NOTE — ED PROVIDER NOTE - PHYSICAL EXAMINATION
CONSTITUTIONAL: NAD, well appearing M in stretcher  HEAD: NCAT  EYES: NL inspection  ENT: MMM  NECK: Supple; non tender.  CARD: RRR  RESP: CTAB  ABD: S/NT no R/G  EXT: R 1st toe - unwrapped and visualized, nail missing, ulcer at medial edge of nail bed , no expressed pus but has surrounding erythema and swelling, skin tear on plantar surface  L 5th toe only tissue maintained, distal phalanx removed   NEURO: Grossly unremarkable  PSYCH: Cooperative, appropriate.

## 2022-01-13 NOTE — H&P ADULT - NSHPPHYSICALEXAM_GEN_ALL_CORE
T(F): 97.5 (01-13-22 @ 16:56), Max: 98.7 (01-13-22 @ 12:54)  HR: 80 (01-13-22 @ 16:56) (80 - 94)  BP: 121/73 (01-13-22 @ 16:56) (121/73 - 136/80)  RR: 16 (01-13-22 @ 16:56) (16 - 16)  SpO2: 97% (01-13-22 @ 16:56) (97% - 98%)    PHYSICAL EXAM:  GENERAL: NAD, well-developed  HEAD:  Atraumatic, Normocephalic  EYES: EOMI, PERRLA, conjunctiva and sclera clear  NECK: Supple, No JVD  CHEST/LUNG: Clear to auscultation bilaterally; No wheeze  HEART: Regular rate and rhythm; No murmurs, rubs, or gallops  ABDOMEN: Soft, Nontender, Nondistended; Bowel sounds present  EXTREMITIES:  2+ Peripheral Pulses, No clubbing, cyanosis, or edema  PSYCH: AAOx3  NEUROLOGY: non-focal  SKIN: No rashes or lesions

## 2022-01-13 NOTE — ED PROVIDER NOTE - RAPID ASSESSMENT
51 M with PMHx of DM, ESRD on HD (TTHS) presents with worsening R big toe infection. Was on a 10 day abx course which he finished last week. Was sent by podiatrist concerned for possible amputation. Pt is well appearing, nontoxic, wearing shoes, speaking in full sentences.     Scribe Statement: Irish MDADOX Tiffany, attest that this documentation has been prepared under the direction and in the presence of Manuel Reddy) 51 M with PMHx of DM, ESRD on HD (TTHS) presents with worsening R big toe infection. Was on a 10 day abx course which he finished last week. Was sent by podiatrist concerned for possible amputation. Pt is well appearing, nontoxic, wearing shoes, speaking in full sentences.     Scribe Statement: I, Aleksandra Coburn, attest that this documentation has been prepared under the direction and in the presence of Manuel Reddy)    Barry REEVES: The scribe's documentation has been prepared under my direction and personally reviewed by me in its entirety. I confirm that the note above accurately reflects all work, treatment, procedures, and medical decision making performed by me (Dr. Reddy).  Attending Note --     I saw the patient waiting area via televideo connection; a brief history was taken and a thorough physical exam was not performed as there is no physical exam room available.  The patient will be seen and further worked up in the main emergency department and their care will be completed by the main emergency department team.  I was not involved in this patient's care during the QDOC process, and unless otherwise noted in the ED provider note, was not involved in their care during their ED course.  --ALLEGRA

## 2022-01-13 NOTE — CONSULT NOTE ADULT - ASSESSMENT
51M with PMH of DM, ESRD TTS presents with right foot hallux wound to bone. Vascular Surgery consulted for revascularization work-up.    Recommendations:  - Plan for angiogram (cath lab) tomorrow, 1/14 with Dr. Malone  - NPO after midnight  - COVID neg  - Pre-op labs at 4am  - No A/c indicated   - Consented in ED  - Rest of care per primary team    Discussed and seen with Dr. Malone  Vascular Surgery team  [id number] 51M with PMH of DM, ESRD TTS presents with right foot hallux wound to bone. Vascular Surgery consulted for revascularization work-up.    Recommendations:  - Plan for angiogram (cath lab) tomorrow, 1/14 with Dr. Malone  - NPO after midnight  - COVID neg  - Pre-op labs at 4am  - No A/c indicated   - Consented in ED  - Medicine and cardiology clearance  - Nephrology c/s for ESRD and HD management   - Rest of care per primary team    Discussed and seen with Dr. Malone  Vascular Surgery team  [id number] 51M with PMH of DM, ESRD TTS (s/p L AVF by Dr. Malone in 2017) presents with right foot hallux wound to bone. Vascular Surgery consulted for revascularization work-up.    Recommendations:  - Plan for angiogram (cath lab) tomorrow, 1/14 with Dr. Malone  - NPO after midnight  - COVID neg  - Pre-op labs at 4am  - No A/c indicated   - Consented in ED  - Medicine and cardiology clearance  - Nephrology c/s for ESRD and HD management (Dr. Bharath Romo)  - Rest of care per primary team    Discussed and seen with Dr. Malone  Vascular Surgery team  [id number]

## 2022-01-13 NOTE — ED ADULT NURSE NOTE - NSIMPLEMENTINTERV_GEN_ALL_ED
Implemented All Universal Safety Interventions:  [place] to call system. Call bell, personal items and telephone within reach. Instruct patient to call for assistance. Room bathroom lighting operational. Non-slip footwear when patient is off stretcher. Physically safe environment: no spills, clutter or unnecessary equipment. Stretcher in lowest position, wheels locked, appropriate side rails in place.

## 2022-01-13 NOTE — ED PROVIDER NOTE - OBJECTIVE STATEMENT
50yo M with PMH of DM, ESRD TTS presents to ED from Dr Myers podiatry office for eval of R 1st toe. Started with ulcer 3 wks ago, initially placed on abbx for last 10d which he completed. Today went to podiatry and found that ulcer was still open and when probed, went to bone. Painless as pt has no sensation in toes due to diabetes. No fever but did have chills. No streaking up leg. Had previous bone removal in L 5th toe for OM. Otherwise feels well. No CP, SOB, cough, abd pain, NVDC.

## 2022-01-13 NOTE — CHART NOTE - NSCHARTNOTEFT_GEN_A_CORE
Vascular Surgery Pre-Op Checklist    Patient is a 51y old  Male who presents with a chief complaint of R hallux wound   Diagnosis: Peripheral vascular disease   Procedure: Right lower extremity angiogram   Surgeon: Dr. Malone    Labs:                         11.2   5.98  )-----------( 209      ( 13 Jan 2022 18:45 )             36.8     01-13    137  |  93<L>  |  24<H>  ----------------------------<  208<H>  3.9   |  30  |  4.74<H>    Ca    9.9      13 Jan 2022 18:45    TPro  8.4<H>  /  Alb  4.4  /  TBili  0.4  /  DBili  x   /  AST  11  /  ALT  12  /  AlkPhos  112  01-13    PT/INR - ( 13 Jan 2022 18:45 )   PT: 13.4 sec;   INR: 1.12 ratio         PTT - ( 13 Jan 2022 18:45 )  PTT:34.1 sec         Medications:  MEDICATIONS  (STANDING):  aspirin  chewable 81 milliGRAM(s) Oral daily  atorvastatin 40 milliGRAM(s) Oral at bedtime  carvedilol 6.25 milliGRAM(s) Oral every 12 hours  dextrose 40% Gel 15 Gram(s) Oral once  dextrose 5%. 1000 milliLiter(s) (50 mL/Hr) IV Continuous <Continuous>  dextrose 5%. 1000 milliLiter(s) (100 mL/Hr) IV Continuous <Continuous>  dextrose 50% Injectable 25 Gram(s) IV Push once  dextrose 50% Injectable 12.5 Gram(s) IV Push once  dextrose 50% Injectable 25 Gram(s) IV Push once  glucagon  Injectable 1 milliGRAM(s) IntraMuscular once  heparin   Injectable 5000 Unit(s) SubCutaneous every 8 hours  insulin glargine Injectable (LANTUS) 10 Unit(s) SubCutaneous at bedtime  insulin lispro (ADMELOG) corrective regimen sliding scale   SubCutaneous three times a day before meals  lisinopril 2.5 milliGRAM(s) Oral daily  midodrine. 5 milliGRAM(s) Oral <User Schedule>  Nephro-bridger 1 Tablet(s) Oral daily    MEDICATIONS  (PRN):      Pre-Op Checklist  [x] NPO after midnight  [ ] IVF -- per Nephrology  [ ] CXR -- done   [ ] EKG -- done   [x] T&S -- 2 in system, done   [x] AM CBC -- ordered  [x] AM BMP -- ordered  [x] AM PT, PTT, INR -- ordered  [x] COVID -- negative   [ ] Documentation of medical optimization -- IM note   [ ] Documentation of cardiac optimization -- cardiology note   [x] Consent     Vascular Surgery  [id number]

## 2022-01-13 NOTE — CONSULT NOTE ADULT - SUBJECTIVE AND OBJECTIVE BOX
Podiatry pager #: [phone] ([place])/ [phone] ([facility])    Patient is a 51y old  Male who presents with a chief complaint of right foot hallux wound to bone     HPI:  50yo M with PMH of DM, ESRD TTS presents to ED from Dr Myers podiatry office for eval of R 1st toe. Started with ulcer 3 wks ago, initially placed on abbx for last 10d which he completed. Today went to podiatry and found that ulcer was still open and when probed, went to bone. Painless as pt has no sensation in toes due to diabetes. No fever but did have chills. No streaking up leg. Had previous bone removal in L 5th toe for OM. Otherwise feels well. No CP, SOB, cough, abd pain, NVDC.    PAST MEDICAL & SURGICAL HISTORY:  Diabetes mellitus  type 2    Foot ulcer due to secondary DM    Coronary artery disease    Acute on chronic systolic heart failure    Breast Reduction  at age 18    Toe amputation status, left    S/P CABG (coronary artery bypass graft)    AICD (automatic cardioverter/defibrillator) present        MEDICATIONS  (STANDING):    MEDICATIONS  (PRN):      Allergies    No Known Allergies    Intolerances        VITALS:    Vital Signs Last 24 Hrs  T(C): 36.4 (13 Jan 2022 16:56), Max: 37.1 (13 Jan 2022 12:54)  T(F): 97.5 (13 Jan 2022 16:56), Max: 98.7 (13 Jan 2022 12:54)  HR: 80 (13 Jan 2022 16:56) (80 - 94)  BP: 121/73 (13 Jan 2022 16:56) (121/73 - 136/80)  BP(mean): --  RR: 16 (13 Jan 2022 16:56) (16 - 16)  SpO2: 97% (13 Jan 2022 16:56) (97% - 98%)    LABS:                          11.2   5.98  )-----------( 209      ( 13 Jan 2022 18:45 )             36.8       01-13    137  |  93<L>  |  24<H>  ----------------------------<  208<H>  3.9   |  30  |  4.74<H>    Ca    9.9      13 Jan 2022 18:45    TPro  8.4<H>  /  Alb  4.4  /  TBili  0.4  /  DBili  x   /  AST  11  /  ALT  12  /  AlkPhos  112  01-13      CAPILLARY BLOOD GLUCOSE          PT/INR - ( 13 Jan 2022 18:45 )   PT: 13.4 sec;   INR: 1.12 ratio         PTT - ( 13 Jan 2022 18:45 )  PTT:34.1 sec    LOWER EXTREMITY PHYSICAL EXAM:    Vascular: DP0/4, PT 1/4 B/L, CFT <3 seconds B/L, Temperature gradient warm to cool B/L  Neuro: Epicritic sensation absent to the level of digits B/L  Musculoskeletal/Ortho: unremarkable   Skin: Right foot hallux nailbed wound probing to bone, erythema to MPJ, dactylitis of hallux, no purulence, no malodor, no tracking, no fluctuance     RADIOLOGY & ADDITIONAL STUDIES:

## 2022-01-14 DIAGNOSIS — E88.9 METABOLIC DISORDER, UNSPECIFIED: ICD-10-CM

## 2022-01-14 DIAGNOSIS — I77.1 STRICTURE OF ARTERY: ICD-10-CM

## 2022-01-14 DIAGNOSIS — N18.9 CHRONIC KIDNEY DISEASE, UNSPECIFIED: ICD-10-CM

## 2022-01-14 DIAGNOSIS — E11.9 TYPE 2 DIABETES MELLITUS WITHOUT COMPLICATIONS: ICD-10-CM

## 2022-01-14 DIAGNOSIS — I73.9 PERIPHERAL VASCULAR DISEASE, UNSPECIFIED: ICD-10-CM

## 2022-01-14 DIAGNOSIS — I10 ESSENTIAL (PRIMARY) HYPERTENSION: ICD-10-CM

## 2022-01-14 DIAGNOSIS — N18.6 END STAGE RENAL DISEASE: ICD-10-CM

## 2022-01-14 LAB
A1C WITH ESTIMATED AVERAGE GLUCOSE RESULT: 5.9 % — HIGH (ref 4–5.6)
ANION GAP SERPL CALC-SCNC: 14 MMOL/L — SIGNIFICANT CHANGE UP (ref 5–17)
APTT BLD: 34.5 SEC — SIGNIFICANT CHANGE UP (ref 27.5–35.5)
BLD GP AB SCN SERPL QL: NEGATIVE — SIGNIFICANT CHANGE UP
BUN SERPL-MCNC: 30 MG/DL — HIGH (ref 7–23)
CALCIUM SERPL-MCNC: 9.4 MG/DL — SIGNIFICANT CHANGE UP (ref 8.4–10.5)
CHLORIDE SERPL-SCNC: 96 MMOL/L — SIGNIFICANT CHANGE UP (ref 96–108)
CO2 SERPL-SCNC: 29 MMOL/L — SIGNIFICANT CHANGE UP (ref 22–31)
CREAT SERPL-MCNC: 5.59 MG/DL — HIGH (ref 0.5–1.3)
ERYTHROCYTE [SEDIMENTATION RATE] IN BLOOD: 75 MM/HR — HIGH (ref 0–20)
ESTIMATED AVERAGE GLUCOSE: 123 MG/DL — HIGH (ref 68–114)
GLUCOSE BLDC GLUCOMTR-MCNC: 104 MG/DL — HIGH (ref 70–99)
GLUCOSE BLDC GLUCOMTR-MCNC: 107 MG/DL — HIGH (ref 70–99)
GLUCOSE BLDC GLUCOMTR-MCNC: 109 MG/DL — HIGH (ref 70–99)
GLUCOSE BLDC GLUCOMTR-MCNC: 119 MG/DL — HIGH (ref 70–99)
GLUCOSE BLDC GLUCOMTR-MCNC: 121 MG/DL — HIGH (ref 70–99)
GLUCOSE BLDC GLUCOMTR-MCNC: 95 MG/DL — SIGNIFICANT CHANGE UP (ref 70–99)
GLUCOSE SERPL-MCNC: 131 MG/DL — HIGH (ref 70–99)
HCT VFR BLD CALC: 31.1 % — LOW (ref 39–50)
HGB BLD-MCNC: 9.7 G/DL — LOW (ref 13–17)
INR BLD: 1.17 RATIO — HIGH (ref 0.88–1.16)
MCHC RBC-ENTMCNC: 29.6 PG — SIGNIFICANT CHANGE UP (ref 27–34)
MCHC RBC-ENTMCNC: 31.2 GM/DL — LOW (ref 32–36)
MCV RBC AUTO: 94.8 FL — SIGNIFICANT CHANGE UP (ref 80–100)
NRBC # BLD: 0 /100 WBCS — SIGNIFICANT CHANGE UP (ref 0–0)
PLATELET # BLD AUTO: 182 K/UL — SIGNIFICANT CHANGE UP (ref 150–400)
POTASSIUM SERPL-MCNC: 3.7 MMOL/L — SIGNIFICANT CHANGE UP (ref 3.5–5.3)
POTASSIUM SERPL-SCNC: 3.7 MMOL/L — SIGNIFICANT CHANGE UP (ref 3.5–5.3)
PROTHROM AB SERPL-ACNC: 13.9 SEC — HIGH (ref 10.6–13.6)
RBC # BLD: 3.28 M/UL — LOW (ref 4.2–5.8)
RBC # FLD: 18.2 % — HIGH (ref 10.3–14.5)
RH IG SCN BLD-IMP: POSITIVE — SIGNIFICANT CHANGE UP
SARS-COV-2 RNA SPEC QL NAA+PROBE: SIGNIFICANT CHANGE UP
SODIUM SERPL-SCNC: 139 MMOL/L — SIGNIFICANT CHANGE UP (ref 135–145)
WBC # BLD: 5.91 K/UL — SIGNIFICANT CHANGE UP (ref 3.8–10.5)
WBC # FLD AUTO: 5.91 K/UL — SIGNIFICANT CHANGE UP (ref 3.8–10.5)

## 2022-01-14 PROCEDURE — 99223 1ST HOSP IP/OBS HIGH 75: CPT | Mod: GC

## 2022-01-14 PROCEDURE — 93306 TTE W/DOPPLER COMPLETE: CPT | Mod: 26

## 2022-01-14 PROCEDURE — 36247 INS CATH ABD/L-EXT ART 3RD: CPT | Mod: RT,59

## 2022-01-14 PROCEDURE — 75625 CONTRAST EXAM ABDOMINL AORTA: CPT | Mod: 26

## 2022-01-14 PROCEDURE — 75710 ARTERY X-RAYS ARM/LEG: CPT | Mod: 26,59

## 2022-01-14 PROCEDURE — 37226: CPT | Mod: RT

## 2022-01-14 RX ORDER — MUPIROCIN 20 MG/G
1 OINTMENT TOPICAL
Refills: 0 | Status: DISCONTINUED | OUTPATIENT
Start: 2022-01-14 | End: 2022-01-17

## 2022-01-14 RX ORDER — ACETAMINOPHEN 500 MG
650 TABLET ORAL EVERY 6 HOURS
Refills: 0 | Status: DISCONTINUED | OUTPATIENT
Start: 2022-01-14 | End: 2022-01-17

## 2022-01-14 RX ORDER — OXYCODONE HYDROCHLORIDE 5 MG/1
5 TABLET ORAL EVERY 6 HOURS
Refills: 0 | Status: DISCONTINUED | OUTPATIENT
Start: 2022-01-14 | End: 2022-01-17

## 2022-01-14 RX ORDER — CILOSTAZOL 100 MG/1
50 TABLET ORAL EVERY 12 HOURS
Refills: 0 | Status: DISCONTINUED | OUTPATIENT
Start: 2022-01-14 | End: 2022-01-17

## 2022-01-14 RX ORDER — CLOPIDOGREL BISULFATE 75 MG/1
75 TABLET, FILM COATED ORAL DAILY
Refills: 0 | Status: DISCONTINUED | OUTPATIENT
Start: 2022-01-14 | End: 2022-01-17

## 2022-01-14 RX ADMIN — HEPARIN SODIUM 5000 UNIT(S): 5000 INJECTION INTRAVENOUS; SUBCUTANEOUS at 05:36

## 2022-01-14 RX ADMIN — LISINOPRIL 2.5 MILLIGRAM(S): 2.5 TABLET ORAL at 05:35

## 2022-01-14 RX ADMIN — ATORVASTATIN CALCIUM 40 MILLIGRAM(S): 80 TABLET, FILM COATED ORAL at 22:51

## 2022-01-14 RX ADMIN — PIPERACILLIN AND TAZOBACTAM 25 GRAM(S): 4; .5 INJECTION, POWDER, LYOPHILIZED, FOR SOLUTION INTRAVENOUS at 11:00

## 2022-01-14 RX ADMIN — HEPARIN SODIUM 5000 UNIT(S): 5000 INJECTION INTRAVENOUS; SUBCUTANEOUS at 22:51

## 2022-01-14 RX ADMIN — CARVEDILOL PHOSPHATE 6.25 MILLIGRAM(S): 80 CAPSULE, EXTENDED RELEASE ORAL at 05:35

## 2022-01-14 RX ADMIN — MUPIROCIN 1 APPLICATION(S): 20 OINTMENT TOPICAL at 22:51

## 2022-01-14 RX ADMIN — Medication 250 MILLIGRAM(S): at 03:34

## 2022-01-14 RX ADMIN — PIPERACILLIN AND TAZOBACTAM 25 GRAM(S): 4; .5 INJECTION, POWDER, LYOPHILIZED, FOR SOLUTION INTRAVENOUS at 22:50

## 2022-01-14 RX ADMIN — CARVEDILOL PHOSPHATE 6.25 MILLIGRAM(S): 80 CAPSULE, EXTENDED RELEASE ORAL at 22:51

## 2022-01-14 NOTE — CONSULT NOTE ADULT - SUBJECTIVE AND OBJECTIVE BOX
CHIEF COMPLAINT:Patient is a 51y old  Male who presents with a chief complaint of     HISTORY OF PRESENT ILLNESS:    51 year old male with HTN, DM II, ESRD on HD, CAD s/p CABG, Ischemic CM with improved systolic function   presents with right foot ulcer planned for lower ext angio  pt denies any chest pain, sob, palpitation, dizziness or syncope.     PAST MEDICAL & SURGICAL HISTORY:  Diabetes mellitus  type 2    Foot ulcer due to secondary DM    Coronary artery disease    Acute on chronic systolic heart failure    Breast Reduction  at age 18    Toe amputation status, left    S/P CABG (coronary artery bypass graft)    AICD (automatic cardioverter/defibrillator) present            MEDICATIONS:  aspirin  chewable 81 milliGRAM(s) Oral daily  carvedilol 6.25 milliGRAM(s) Oral every 12 hours  heparin   Injectable 5000 Unit(s) SubCutaneous every 8 hours  lisinopril 2.5 milliGRAM(s) Oral daily  midodrine. 5 milliGRAM(s) Oral <User Schedule>    piperacillin/tazobactam IVPB.. 3.375 Gram(s) IV Intermittent every 12 hours          atorvastatin 40 milliGRAM(s) Oral at bedtime  dextrose 40% Gel 15 Gram(s) Oral once  dextrose 50% Injectable 25 Gram(s) IV Push once  dextrose 50% Injectable 12.5 Gram(s) IV Push once  dextrose 50% Injectable 25 Gram(s) IV Push once  glucagon  Injectable 1 milliGRAM(s) IntraMuscular once  insulin glargine Injectable (LANTUS) 10 Unit(s) SubCutaneous at bedtime  insulin lispro (ADMELOG) corrective regimen sliding scale   SubCutaneous three times a day before meals    dextrose 5%. 1000 milliLiter(s) IV Continuous <Continuous>  dextrose 5%. 1000 milliLiter(s) IV Continuous <Continuous>  Nephro-bridger 1 Tablet(s) Oral daily      FAMILY HISTORY:  Family history of diabetes mellitus (Father)        Non-contributory    SOCIAL HISTORY:    No tobacco, drugs or etoh    Allergies    No Known Allergies    Intolerances    	    REVIEW OF SYSTEMS:  as above  The rest of the 14 points ROS reviewed and except above they are unremarkable.        PHYSICAL EXAM:  T(C): 36.8 (01-14-22 @ 13:55), Max: 36.8 (01-14-22 @ 12:02)  HR: 91 (01-14-22 @ 13:55) (65 - 91)  BP: 116/62 (01-14-22 @ 13:55) (110/60 - 137/68)  RR: 18 (01-14-22 @ 13:55) (16 - 19)  SpO2: 98% (01-14-22 @ 13:55) (97% - 99%)  Wt(kg): --  I&O's Summary      JVP: Normal  Neck: supple  Lung: clear   CV: S1 S2 , Murmur:  Abd: soft  Ext: No edema  neuro: Awake / alert  Psych: flat affect  Skin: right foot ulcer       LABS/DATA:    TELEMETRY: 	    ECG:  	   	< from: 12 Lead ECG (01.13.22 @ 18:27) >  Diagnosis Line NORMAL SINUS RHYTHM  LEFT AXIS DEVIATION  INCOMPLETE RIGHT BUNDLE BRANCH BLOCK  ANTEROSEPTAL INFARCT (CITED ON OR BEFORE 15-NOV-2016)  T WAVE ABNORMALITY, CONSIDER LATERAL ISCHEMIA -new  ABNORMAL ECG  WHEN COMPARED WITH ECG OF 09-DEC-2017 12:23,  SIGNIFICANT CHANGES HAVE OCCURRED  Confirmed by ANGIE BRANNON MD ([id number]) on 1/14/2022 8:44:34 AM    < end of copied text >    CARDIAC MARKERS:                        116 <<== 01-13-22 @ 18:45                              9.7    5.91  )-----------( 182      ( 14 Jan 2022 04:17 )             31.1     01-14    139  |  96  |  30<H>  ----------------------------<  131<H>  3.7   |  29  |  5.59<H>    Ca    9.4      14 Jan 2022 04:17    TPro  8.4<H>  /  Alb  4.4  /  TBili  0.4  /  DBili  x   /  AST  11  /  ALT  12  /  AlkPhos  112  01-13    proBNP:   Lipid Profile:   HgA1c:   TSH:

## 2022-01-14 NOTE — CONSULT NOTE ADULT - ASSESSMENT
left foot ulcer   on anbx  plan for angio  fu with podiatry and vascular surgery     CAD s/p cabg  stable   asa, statin    chronic systolic chf  stable  improved LV function   cont BB / ace    ESRD  On HD. follow up with renal. Monitor electrolytes, K, ca. Avoid significant nephrotoxic medications.    DM II  Monitor finger stick. Insulin coverage. Diabetic education and Diabetic diet. Consider nutrition consultation.    Pre-Operative Cardiac Risk Stratification and Optimization  Based on patient history and physical exam, the patient is considered to have elevated risk   recent echo in my office last week showed EF 50 percent   pt has no active or symptomatic cardiac issues to preclude lower ext angio  can proceed with acceptable elevated risk for this urgent procedure

## 2022-01-14 NOTE — PROGRESS NOTE ADULT - ASSESSMENT
50yo M w/ h/o DM, ESRD on HD, DIabetic neuropathy, doming in w diabetic foot ulcer with OM of right foot hallux wound to bone  - pt seen and evaluated by podiatry and vascular  -  ESR elevated   - Right foot hallux nailbed wound probing to bone, erythema to MPJ, dactylitis of hallux, no purulence, no malodor, no tracking, no fluctuance   - Right foot wound culture taken by podiatry  - Pt seen by vascular as well:  Dr. Malone at bedside, awaiting angiogram today  - house renal called, next HD due 1/15  - card consult called, TTE ordered, needs clearance for angiogram  - Pod plan for right foot partial 1st ray resection vs. partial hallux amputation pending imaging results  - ptn is medically cleared  for angiogram and  surgery under light sedation w/ local anesthesia   - dvt ppx w [facility]  - cont outptn meds

## 2022-01-14 NOTE — ED ADULT NURSE REASSESSMENT NOTE - NS ED NURSE REASSESS COMMENT FT1
Received report from JARROD Lau. Pt A&OX4, VSS, NAD noted. No complaints offered at this time. Respirations non-labored and easy. Abdomen soft, non-distended, non-tender. Skin warm, dry, color normal for race. Pt remains NPO. Plan of care discussed. Safety maintained.

## 2022-01-14 NOTE — CONSULT NOTE ADULT - PROBLEM SELECTOR RECOMMENDATION 4
Obtain serum phosphorus.    If any questions, please feel free to contact me     Sean Flores  Nephrology Fellow  [facility] Pager: [phone]  [facility] Pager: [phone]

## 2022-01-14 NOTE — CHART NOTE - NSCHARTNOTEFT_GEN_A_CORE
POST-OPERATIVE NOTE    Subjective:  Patient is s/p RLE angio via L CFA access, stenting of mid and distal SFA as well as popliteal and PT trunk, with mynx closure. Recovering appropriately.     Vital Signs Last 24 Hrs  T(C): 36.6 (14 Jan 2022 20:31), Max: 36.8 (14 Jan 2022 12:02)  T(F): 97.8 (14 Jan 2022 20:31), Max: 98.2 (14 Jan 2022 12:02)  HR: 82 (14 Jan 2022 21:30) (65 - 91)  BP: 142/85 (14 Jan 2022 21:30) (113/63 - 167/77)  BP(mean): 80 (14 Jan 2022 04:15) (80 - 80)  RR: 16 (14 Jan 2022 21:30) (15 - 19)  SpO2: 98% (14 Jan 2022 21:30) (96% - 99%)  I&O's Detail    piperacillin/tazobactam IVPB.. 3.375  carvedilol 6.25  cilostazol 50  clopidogrel Tablet 75  heparin   Injectable 5000  lisinopril 2.5  midodrine. 5  piperacillin/tazobactam IVPB.. 3.375    PAST MEDICAL & SURGICAL HISTORY:  Diabetes mellitus  type 2    Foot ulcer due to secondary DM    Coronary artery disease    Acute on chronic systolic heart failure    Breast Reduction  at age 18    Toe amputation status, left    S/P CABG (coronary artery bypass graft)    AICD (automatic cardioverter/defibrillator) present          Physical Exam:  General: NAD, resting comfortably in bed  Pulmonary: Nonlabored breathing, no respiratory distress  Abdominal: soft, NT/ND, L groin site soft, no collections or ecchymoses   Extremities: RLE:     LABS:                        9.7    5.91  )-----------( 182      ( 14 Jan 2022 04:17 )             31.1     01-14    139  |  96  |  30<H>  ----------------------------<  131<H>  3.7   |  29  |  5.59<H>    Ca    9.4      14 Jan 2022 04:17    TPro  8.4<H>  /  Alb  4.4  /  TBili  0.4  /  DBili  x   /  AST  11  /  ALT  12  /  AlkPhos  112  01-13    PT/INR - ( 14 Jan 2022 04:17 )   PT: 13.9 sec;   INR: 1.17 ratio         PTT - ( 14 Jan 2022 04:17 )  PTT:34.5 sec  CAPILLARY BLOOD GLUCOSE      POCT Blood Glucose.: 95 mg/dL (14 Jan 2022 21:15)  POCT Blood Glucose.: 104 mg/dL (14 Jan 2022 18:04)  POCT Blood Glucose.: 121 mg/dL (14 Jan 2022 14:13)  POCT Blood Glucose.: 119 mg/dL (14 Jan 2022 10:58)  POCT Blood Glucose.: 109 mg/dL (14 Jan 2022 07:55)      Radiology and Additional Studies:    Assessment:  The patient is a 51y Male who is now several hours post-op from a RLE angio via L CFA access, stenting of mid and distal SFA as well as popliteal and PT trunk, with mynx closure.     Plan:  - Pain control as needed  - DVT ppx, started on pletal and plavix  - Scheduled for hemodialysis tomorrow   - Podiatry for R foot partial 1st ray resection tentatively for Monday at 3pm   - F/u AM labs    Vascular Surgery  [id number] POST-OPERATIVE NOTE    Subjective:  Patient is s/p RLE angio via L CFA access, stenting of mid and distal SFA as well as popliteal and PT trunk, with mynx closure. Recovering appropriately. Denies chest pain, SOB, palpitations, nausea. Endorses pain in groin.     Vital Signs Last 24 Hrs  T(C): 36.6 (14 Jan 2022 20:31), Max: 36.8 (14 Jan 2022 12:02)  T(F): 97.8 (14 Jan 2022 20:31), Max: 98.2 (14 Jan 2022 12:02)  HR: 82 (14 Jan 2022 21:30) (65 - 91)  BP: 142/85 (14 Jan 2022 21:30) (113/63 - 167/77)  BP(mean): 80 (14 Jan 2022 04:15) (80 - 80)  RR: 16 (14 Jan 2022 21:30) (15 - 19)  SpO2: 98% (14 Jan 2022 21:30) (96% - 99%)  I&O's Detail    piperacillin/tazobactam IVPB.. 3.375  carvedilol 6.25  cilostazol 50  clopidogrel Tablet 75  heparin   Injectable 5000  lisinopril 2.5  midodrine. 5  piperacillin/tazobactam IVPB.. 3.375    PAST MEDICAL & SURGICAL HISTORY:  Diabetes mellitus  type 2    Foot ulcer due to secondary DM    Coronary artery disease    Acute on chronic systolic heart failure    Breast Reduction  at age 18    Toe amputation status, left    S/P CABG (coronary artery bypass graft)    AICD (automatic cardioverter/defibrillator) present          Physical Exam:  General: NAD, resting comfortably in bed  Pulmonary: Nonlabored breathing, no respiratory distress  Abdominal: soft, NT/ND, L groin site soft, no collections or ecchymoses, pressure dressing in place   Extremities: RLE: WWP, dopplerable DP/PT     LABS:                        9.7    5.91  )-----------( 182      ( 14 Jan 2022 04:17 )             31.1     01-14    139  |  96  |  30<H>  ----------------------------<  131<H>  3.7   |  29  |  5.59<H>    Ca    9.4      14 Jan 2022 04:17    TPro  8.4<H>  /  Alb  4.4  /  TBili  0.4  /  DBili  x   /  AST  11  /  ALT  12  /  AlkPhos  112  01-13    PT/INR - ( 14 Jan 2022 04:17 )   PT: 13.9 sec;   INR: 1.17 ratio         PTT - ( 14 Jan 2022 04:17 )  PTT:34.5 sec  CAPILLARY BLOOD GLUCOSE      POCT Blood Glucose.: 95 mg/dL (14 Jan 2022 21:15)  POCT Blood Glucose.: 104 mg/dL (14 Jan 2022 18:04)  POCT Blood Glucose.: 121 mg/dL (14 Jan 2022 14:13)  POCT Blood Glucose.: 119 mg/dL (14 Jan 2022 10:58)  POCT Blood Glucose.: 109 mg/dL (14 Jan 2022 07:55)      Radiology and Additional Studies:    Assessment:  The patient is a 51y Male who is now several hours post-op from a RLE angio via L CFA access, stenting of mid and distal SFA as well as popliteal and PT trunk, with mynx closure.     Plan:  - Pain control as needed  - DVT ppx, started on pletal and plavix  - Scheduled for hemodialysis tomorrow   - Podiatry for R foot partial 1st ray resection tentatively for Monday at 3pm   - F/u AM labs    Vascular Surgery  [id number] POST-OPERATIVE NOTE    Subjective:  Patient is s/p RLE angio via L CFA access, stenting of mid and distal SFA as well as popliteal and PT trunk, with mynx closure. Recovering appropriately. Denies chest pain, SOB, palpitations, nausea. Endorses pain in groin.     Vital Signs Last 24 Hrs  T(C): 36.6 (14 Jan 2022 20:31), Max: 36.8 (14 Jan 2022 12:02)  T(F): 97.8 (14 Jan 2022 20:31), Max: 98.2 (14 Jan 2022 12:02)  HR: 82 (14 Jan 2022 21:30) (65 - 91)  BP: 142/85 (14 Jan 2022 21:30) (113/63 - 167/77)  BP(mean): 80 (14 Jan 2022 04:15) (80 - 80)  RR: 16 (14 Jan 2022 21:30) (15 - 19)  SpO2: 98% (14 Jan 2022 21:30) (96% - 99%)  I&O's Detail    piperacillin/tazobactam IVPB.. 3.375  carvedilol 6.25  cilostazol 50  clopidogrel Tablet 75  heparin   Injectable 5000  lisinopril 2.5  midodrine. 5  piperacillin/tazobactam IVPB.. 3.375    PAST MEDICAL & SURGICAL HISTORY:  Diabetes mellitus  type 2    Foot ulcer due to secondary DM    Coronary artery disease    Acute on chronic systolic heart failure    Breast Reduction  at age 18    Toe amputation status, left    S/P CABG (coronary artery bypass graft)    AICD (automatic cardioverter/defibrillator) present          Physical Exam:  General: NAD, resting comfortably in bed  Pulmonary: Nonlabored breathing, no respiratory distress  Abdominal: soft, NT/ND, L groin site soft, no collections or ecchymoses, pressure dressing in place   Extremities: RLE: WWP, dopplerable DP/PT     LABS:                        9.7    5.91  )-----------( 182      ( 14 Jan 2022 04:17 )             31.1     01-14    139  |  96  |  30<H>  ----------------------------<  131<H>  3.7   |  29  |  5.59<H>    Ca    9.4      14 Jan 2022 04:17    TPro  8.4<H>  /  Alb  4.4  /  TBili  0.4  /  DBili  x   /  AST  11  /  ALT  12  /  AlkPhos  112  01-13    PT/INR - ( 14 Jan 2022 04:17 )   PT: 13.9 sec;   INR: 1.17 ratio         PTT - ( 14 Jan 2022 04:17 )  PTT:34.5 sec  CAPILLARY BLOOD GLUCOSE      POCT Blood Glucose.: 95 mg/dL (14 Jan 2022 21:15)  POCT Blood Glucose.: 104 mg/dL (14 Jan 2022 18:04)  POCT Blood Glucose.: 121 mg/dL (14 Jan 2022 14:13)  POCT Blood Glucose.: 119 mg/dL (14 Jan 2022 10:58)  POCT Blood Glucose.: 109 mg/dL (14 Jan 2022 07:55)      Radiology and Additional Studies:    Assessment:  The patient is a 51y Male who is now several hours post-op from a RLE angio via L CFA access, stenting of mid and distal SFA as well as popliteal and PT trunk, with mynx closure.     Plan:  - Pain control as needed  - DVT ppx, started on pletal and plavix  - Scheduled for hemodialysis tomorrow   - Podiatry for R foot partial 1st ray resection tentatively for Monday at 3pm   - F/u AM labs    Vascular Surgery  [id number] POST-OPERATIVE NOTE    Subjective:  Patient is s/p RLE angio via L CFA access, stenting of mid and distal SFA as well as popliteal and PT trunk, with mynx closure. Recovering appropriately. Denies chest pain, SOB, palpitations, nausea. Endorses pain in groin.     Vital Signs Last 24 Hrs  T(C): 36.6 (14 Jan 2022 20:31), Max: 36.8 (14 Jan 2022 12:02)  T(F): 97.8 (14 Jan 2022 20:31), Max: 98.2 (14 Jan 2022 12:02)  HR: 82 (14 Jan 2022 21:30) (65 - 91)  BP: 142/85 (14 Jan 2022 21:30) (113/63 - 167/77)  BP(mean): 80 (14 Jan 2022 04:15) (80 - 80)  RR: 16 (14 Jan 2022 21:30) (15 - 19)  SpO2: 98% (14 Jan 2022 21:30) (96% - 99%)  I&O's Detail    piperacillin/tazobactam IVPB.. 3.375  carvedilol 6.25  cilostazol 50  clopidogrel Tablet 75  heparin   Injectable 5000  lisinopril 2.5  midodrine. 5  piperacillin/tazobactam IVPB.. 3.375    PAST MEDICAL & SURGICAL HISTORY:  Diabetes mellitus  type 2    Foot ulcer due to secondary DM    Coronary artery disease    Acute on chronic systolic heart failure    Breast Reduction  at age 18    Toe amputation status, left    S/P CABG (coronary artery bypass graft)    AICD (automatic cardioverter/defibrillator) present          Physical Exam:  General: NAD, resting comfortably in bed  Pulmonary: Nonlabored breathing, no respiratory distress  Abdominal: soft, NT/ND, L groin site soft, no collections or ecchymoses, pressure dressing in place   Extremities: RLE: R 1st toe wound, dopplerable DP/PT     LABS:                        9.7    5.91  )-----------( 182      ( 14 Jan 2022 04:17 )             31.1     01-14    139  |  96  |  30<H>  ----------------------------<  131<H>  3.7   |  29  |  5.59<H>    Ca    9.4      14 Jan 2022 04:17    TPro  8.4<H>  /  Alb  4.4  /  TBili  0.4  /  DBili  x   /  AST  11  /  ALT  12  /  AlkPhos  112  01-13    PT/INR - ( 14 Jan 2022 04:17 )   PT: 13.9 sec;   INR: 1.17 ratio         PTT - ( 14 Jan 2022 04:17 )  PTT:34.5 sec  CAPILLARY BLOOD GLUCOSE      POCT Blood Glucose.: 95 mg/dL (14 Jan 2022 21:15)  POCT Blood Glucose.: 104 mg/dL (14 Jan 2022 18:04)  POCT Blood Glucose.: 121 mg/dL (14 Jan 2022 14:13)  POCT Blood Glucose.: 119 mg/dL (14 Jan 2022 10:58)  POCT Blood Glucose.: 109 mg/dL (14 Jan 2022 07:55)      Radiology and Additional Studies:    Assessment:  The patient is a 51y Male who is now several hours post-op from a RLE angio via L CFA access, stenting of mid and distal SFA as well as popliteal and PT trunk, with mynx closure.     Plan:  - Pain control as needed  - DVT ppx, started on pletal and plavix  - Scheduled for hemodialysis tomorrow   - Podiatry for R foot partial 1st ray resection tentatively for Monday at 3pm   - F/u AM labs    Vascular Surgery  [id number]

## 2022-01-14 NOTE — PROGRESS NOTE ADULT - ASSESSMENT
51M with PMH of DM, ESRD TTS (s/p L AVF by Dr. Malone in 2017) presents with right foot hallux wound to bone. Plan for RLE angiogram today.     PLAN:   - Cath lab today for RLE angio  - NPO for OR  - Nephro c/s for HD/ESRD mgmt  - IM clearance noted and appreciated  - Please optimize patient from CARDIAC perspective for angiogram and document clearance/optimization when appropriate       Minnie Crabtree, PGY-2  Vascular Surgery  #[id number]

## 2022-01-14 NOTE — CONSULT NOTE ADULT - ASSESSMENT
51M with PMH of DM, ESRD TTS (s/p L AVF by Dr. Malone in 2017) presents with right foot hallux wound to bone. Plan for RLE angiogram today.

## 2022-01-14 NOTE — CONSULT NOTE ADULT - SUBJECTIVE AND OBJECTIVE BOX
CHIEF COMPLAINT:    HISTORY OF PRESENT ILLNESS:  52yo M w/ h/o DM, ESRD on HD, DIabetic neuropathy, doming in w diabetic foot ulcer with OM of right foot hallux wound to bone  No chest pain or shortness of breath       PAST MEDICAL & SURGICAL HISTORY:  Diabetes mellitus  type 2    Foot ulcer due to secondary DM    Coronary artery disease    Acute on chronic systolic heart failure    Breast Reduction  at age 18    Toe amputation status, left    S/P CABG (coronary artery bypass graft)    AICD (automatic cardioverter/defibrillator) present            MEDICATIONS:  aspirin  chewable 81 milliGRAM(s) Oral daily  carvedilol 6.25 milliGRAM(s) Oral every 12 hours  heparin   Injectable 5000 Unit(s) SubCutaneous every 8 hours  lisinopril 2.5 milliGRAM(s) Oral daily  midodrine. 5 milliGRAM(s) Oral <User Schedule>    piperacillin/tazobactam IVPB.. 3.375 Gram(s) IV Intermittent every 12 hours          atorvastatin 40 milliGRAM(s) Oral at bedtime  dextrose 40% Gel 15 Gram(s) Oral once  dextrose 50% Injectable 25 Gram(s) IV Push once  dextrose 50% Injectable 12.5 Gram(s) IV Push once  dextrose 50% Injectable 25 Gram(s) IV Push once  glucagon  Injectable 1 milliGRAM(s) IntraMuscular once  insulin glargine Injectable (LANTUS) 10 Unit(s) SubCutaneous at bedtime  insulin lispro (ADMELOG) corrective regimen sliding scale   SubCutaneous three times a day before meals    dextrose 5%. 1000 milliLiter(s) IV Continuous <Continuous>  dextrose 5%. 1000 milliLiter(s) IV Continuous <Continuous>  Nephro-bridger 1 Tablet(s) Oral daily      FAMILY HISTORY:  Family history of diabetes mellitus (Father)        SOCIAL HISTORY:    [ ] Non-smoker  [ ] Smoker  [ ] Alcohol    Allergies    No Known Allergies    Intolerances    	    REVIEW OF SYSTEMS:  CONSTITUTIONAL: No fever, weight loss, + fatigue  EYES: No eye pain, visual disturbances, or discharge  ENMT:  No difficulty hearing, tinnitus, vertigo; No sinus or throat pain  NECK: No pain or stiffness  RESPIRATORY: No cough, wheezing, chills or hemoptysis; No Shortness of Breath  CARDIOVASCULAR: No chest pain, palpitations, passing out, dizziness, or leg swelling  GASTROINTESTINAL: No abdominal or epigastric pain. No nausea, vomiting, or hematemesis; No diarrhea or constipation. No melena or hematochezia.  GENITOURINARY: No dysuria, frequency, hematuria, or incontinence  NEUROLOGICAL: No headaches, memory loss, loss of strength, numbness, or tremors  SKIN: No itching, burning, rashes, or lesions   LYMPH Nodes: No enlarged glands  ENDOCRINE: No heat or cold intolerance; No hair loss  MUSCULOSKELETAL: + joint pain or swelling; No muscle, back, or extremity pain  PSYCHIATRIC: No depression, anxiety, mood swings, or difficulty sleeping  HEME/LYMPH: No easy bruising, or bleeding gums  ALLERY AND IMMUNOLOGIC: No hives or eczema	    [ ] All others negative	  [ ] Unable to obtain    PHYSICAL EXAM:  T(C): 36.6 (22 @ 07:49), Max: 37.1 (22 @ 12:54)  HR: 67 (22 @ 07:49) (65 - 94)  BP: 119/67 (22 @ 07:49) (110/60 - 136/80)  RR: 16 (22 @ 07:49) (16 - 18)  SpO2: 97% (22 @ 07:49) (97% - 99%)  Wt(kg): --  I&O's Summary      Appearance: Normal	  HEENT:   Normal oral mucosa, PERRL, EOMI	  Lymphatic: No lymphadenopathy  Cardiovascular: Normal S1 S2, No JVD, No murmurs, No edema  Respiratory: Lungs clear to auscultation	  Psychiatry: A & O x 3, Mood & affect appropriate  Gastrointestinal:  Soft, Non-tender, + BS	  Skin: No rashes, No ecchymoses, No cyanosis	  Neurologic: Non-focal  Extremities: R 1st toe with gangrenous wound on distal aspect of toe, no malodor or purulence, no s/s of wet conversion  Vascular: Peripheral pulses palpable 2+ bilaterally    TELEMETRY: 	    ECG:  	NSR Anteroseptal infarct, LAFB, Lateral TWI  RADIOLOGY:  < from: Xray Foot AP + Lateral + Oblique, Right (22 @ 20:30) >    ACC: [id number] EXAM:  XR FOOT COMP MIN 3 VIEWS RT                          PROCEDURE DATE:  2022          INTERPRETATION:  CLINICAL INDICATION: right foot hallux wound; evaluate   for osteomyelitis    EXAM:  Frontal, oblique, and lateral rightfoot from 2022 at 2030. Compared   to prior study from 2011.    IMPRESSION:  Hallux soft tissue swelling with tip ulceration. Eroded indistinctly   marginated underlying hallux distal phalanx tuft to mid shaft level   consistent with changesof osteomyelitis. No tracking gas collections or   additional areas of osteomyelitis.    No fractures or dislocations.    Tarsometatarsal alignment maintained without evidence for a Lisfranc   injury.    Preserved visualized joint spaces and no jointmargin erosions.    No lytic or blastic lesions.    Vascular calcifications.    --- End of Report ---      < end of copied text >    OTHER: 	  	  LABS:	 	    CARDIAC MARKERS:                                  9.7    5.91  )-----------( 182      ( 2022 04:17 )             31.1     -14    139  |  96  |  30<H>  ----------------------------<  131<H>  3.7   |  29  |  5.59<H>    Ca    9.4      2022 04:17    TPro  8.4<H>  /  Alb  4.4  /  TBili  0.4  /  DBili  x   /  AST  11  /  ALT  12  /  AlkPhos  112  -    proBNP:   Lipid Profile:   HgA1c:   TSH:   < from: Transthoracic Echocardiogram (17 @ 16:26) >    Patient name: ROSA CONDE  YOB: 1970   Age: 47 (M)   MR#: [id number]  Study Date: 2017  Location: [address] [place], [place] [id number]: Matilde [place] RDCS  Study quality: Technically difficult  Referring Physician: EVANGELIST GREEN MD  Blood Pressure: 129/71 mmHg  Height: 178 cm  Weight: 115 kg  BSA: 2.3 m2  ------------------------------------------------------------------------  PROCEDURE: Transthoracic echocardiogram with 2-D, M-Mode  and complete spectral and color flow Doppler.  INDICATION: Dyspnea, unspecified (R06.00)  ------------------------------------------------------------------------  Dimensions:    Normal Values:  LA:     5.7    2.0 - 4.0 cm  Ao:     3.6    2.0 - 3.8 cm  SEPTUM: 0.9    0.6 - 1.2 cm  PWT:    0.8 0.6 - 1.1 cm  LVIDd:  5.4    3.0 - 5.6 cm  LVIDs:  4.1    1.8 - 4.0 cm  Derived variables:  LVMI: 72 g/m2  RWT: 0.29  ------------------------------------------------------------------------  Observations:  Mitral Valve: Normal mitral valve. Mild mitral  regurgitation.  Aortic Valve/Aorta: Aortic valve not well visualized'  appears trileaflet with normal opening. No aortic valve  regurgitation seen.  Aortic Root: 3.6 cm.  Left Atrium: Left atrium not well visualized, probably  normal. Mobile interatrial septum.  Left Ventricle: Endocardium not well visualized; grossly  moderate left ventricular systolic dysfunction. Abnormal  septal motion consistent with conduction defect/paced  rhythm.  Right Heart: Right atrium not well visualized, probably  normal. The right ventricle is not well visualized; grossly  preserved right ventricular systolic function. A device  wire is noted in the right heart. Normal tricuspid valve.  Mild tricuspid regurgitation. Normal pulmonic valve.  Minimal pulmonicregurgitation.  Pericardium/Pleura: Echo-free space seen adjacent to the  apex and posterior to the LV may represent small-moderate  pericardial effusion vs. extension of left pleural  effusion. No echocardiographic evidence of pericardial  tamponade. Consider CT scan of the chest for further  evaluation of the pleural and pericardial spaces if  clinically indicated.  Bilateral pleural effusions.  Hemodynamic: Estimated right atrial pressure is 15 mm Hg.  Estimated right ventricular systolic pressure equals 49 mm  Hg, assuming right atrial pressure equals 15 mm Hg,  consistent with mild pulmonary hypertension.  ------------------------------------------------------------------------  Conclusions:  1. Normal mitral valve. Mild mitral regurgitation.  2. Aortic valve not well visualized' appears trileaflet  with normal opening. No aortic valve regurgitation seen.  3. Endocardium not well visualized; grossly moderate left  ventricular systolic dysfunction. Abnormal septal motion  consistent with conduction defect/paced rhythm.  4. The right ventricle is not well visualized; grossly  preserved right ventricular systolic function. A device  wire is noted in the right heart.  5. Echo-free space seen adjacent to the apex and posterior  to theLV may represent small-moderate pericardial effusion  vs. extension of left pleural effusion. No  echocardiographic evidence of pericardial tamponade.  Consider CT scan of the chest for further evaluation of the  pleural and pericardial spaces if clinically indicated.  6. Bilateral pleural effusions.  7. Ascites seen.  ------------------------------------------------------------------------  Confirmed on  2017 - 18:37:15 by Idania Guillaume M.D.  ------------------------------------------------------------------------    < end of copied text >  < from: Cardiac Cath Lab - Adult (16 @ 09:47) >    [facility]  Department of Cardiology  [address] [place] [address] [phone]  Cath Lab Report -- Comprehensive Report  Patient: ROSA CONDE  Study date: 2016  Account number: [id number]  MR number: [id number]  : 1970  Gender: Male  Race: W  Case Physician(s):  Edinson Fontenot M.D.  Referring Physician:  Alexandr Cuellar M.D.  INDICATIONS: Severe cardiomyopathy with congestive heart failure and severe  volume overload.  HISTORY: There was no prior cardiac history. The patient has hypertension,  oral hypoglycemic-treated diabetes, renal failure (not requiring  dialysis), medication-treated dyslipidemia, and a family history of  coronary artery disease. PRIOR CARDIOVASCULAR PROCEDURES: None.  PROCEDURE:  --  Right heart catheterization.  --  Left heart catheterization.  --  Left coronary angiography.  --  Right coronary angiography.  TECHNIQUE: The risks and alternatives of the procedures and conscious  sedation were explained to the patient and informed consent was obtained.  Cardiac catheterization performed electively.  Local anesthetic given. Right femoral artery access. Right femoral vein  access. Right heart catheterization. The procedure was performed utilizing  a catheter.Left heart catheterization. Left coronary artery angiography.  The vessel was injected utilizing a catheter. Right coronary artery  angiography. The vessel was injected utilizing a catheter. RADIATION  EXPOSURE: 6.1 min.  CONTRAST GIVEN: Omnipaque 47ml.  MEDICATIONS GIVEN: Midazolam, 1 mg, IV. Fentanyl, 25 mcg, IV.  HEMODYNAMICS: Hemodynamic assessment demonstrates moderately elevated  LVEDP, normal cardiac output, and mildly elevated pulmonary capillary  wedge pressure. There is moderate to severe pulmonary hypertension.  VENTRICLES: No left ventriculogram was performed.  CORONARY VESSELS: The coronary circulation is co-dominant.  LM:   --  LM: Normal.  LAD:   --  Ostial LAD: There was a 100 % stenosis. Distal vessel is filled  via right toleft collaterals.  --  D1: There was a tubular 70 % stenosis.  CX:   --  Circumflex: Angiography showed mild atherosclerosis with no flow  limiting lesions.  --  OM1: Angiography showed minor luminal irregularities with no flow  limiting lesions.  -- LPL1: Angiography showed minor luminal irregularities with no flow  limiting lesions.  --  LPDA: Angiography showed minor luminal irregularities with no flow  limiting lesions.  RI:   --  Ostial ramus intermedius: There was a tubular 70 % stenosis.  RCA:   --  Proximal RCA: There was a 85 % stenosis.  --  Distal RCA: There was a 70 % stenosis.  --  RPDA: Angiography showed minor luminal irregularities with no flow  limiting lesions.  --  RPL1: Angiography showed minor luminal irregularities withno flow  limiting lesions.  COMPLICATIONS: There were no complications.  SUMMARY:  HEMODYNAMICS: Hemodynamic assessment demonstrates moderately elevated  LVEDP, normal cardiac output, and mildly elevated pulmonary capillary  wedge pressure.  DIAGNOSTIC IMPRESSIONS: Severe cardiomyopathy with severe volume overload.  Cath with severe 4 vessel disease with ostial  of LAD and severe  proximal Ramus, Diagonal, and RCA lesions. EF known to be 30-35% on recent  TTE.  DIAGNOSTIC RECOMMENDATIONS: CTA evaluation for CABG in light of multivessel  disease, ostial LAD  at a trifurcation of a large ramus and diagonal,  diabetes, severe cardiomyopathy, and CKD.  INTERVENTIONAL IMPRESSIONS: Severe cardiomyopathy with severe volume  overload. Cath withsevere 4 vessel disease with ostial  of LAD and  severe proximal Ramus, Diagonal, and RCA lesions. EF known to be 30-35% on  recent TTE.  INTERVENTIONAL RECOMMENDATIONS: CTA evaluation for CABG in light of  multivessel disease, ostial LAD  at atrifurcation of a large ramus and  diagonal, diabetes, severe cardiomyopathy, and CKD.  Prepared and signed by  Edinson Fontenot M.D.  Signed 2016 11:13:27  HEMODYNAMIC TABLES  Pressures:  Baseline/ Room Air  Pressures:  - HR: 76  Pressures:  -Rhythm:  Pressures:  -- Aortic Pressure (S/D/M): 154/83/113  Pressures:  -- Left Ventricle (s/edp): 150/35/--  Pressures:  -- Pulmonary Artery (S/D/M): 66/27/42  Pressures:  -- Pulmonary Capillary Wedge: 21/23/18  Pressures:  -- Right Atrium (a/v/M):22/19/15  Pressures:  -- Right Ventricle (s/edp): 64/21/--  O2 Sats:  Baseline/ Room Air  O2 Sats:  - HR: 76  O2 Sats:  - Rhythm:  O2 Sats:  -- AO: 12.4/86.8/14.64  O2 Sats:  -- AO: 12.5/91.7/15.59  O2 Sats:  -- PA: 12.4/62.4/10.52    < end of copied text >

## 2022-01-14 NOTE — CONSULT NOTE ADULT - ASSESSMENT
52yo M with PMH of DM, ESRD TTS presents to ED from Dr Myers podiatry office for eval of R 1st toe gangrene.   Started with ulcer 3 wks ago, initially placed on abx for last 10d which he completed. Today went to podiatry and found that ulcer was still open and when probed, went to bone. Painless as pt has no sensation in toes due to diabetes. No fever but did have chills. No streaking up leg. Had previous bone removal in L 5th toe 2/2 OM. Otherwise feels well. No CP, SOB, cough, abd pain,fever, chills (13 Jan 2022 20:10)  Pt at cardiac angiogram at time of bedside visit.     R ft OM:    - Pt afebrile.  Xray shows ulcerated tip of R hallux.  Pt seen by Podiatry, found to have PTB.  ESR 75, CRP 11.   No purulence/tracking/malodor/fluctuance reported.    - f/u wound cx.  Pt started on vanco x 1 and zosyn.  Check next vanco level on 1/16 AM and redose if <20    - Planned for partial hallux amputation.      - Vascular following, getting RLE angiogram. (1/14)      ESRD on dialysis:    - Renally dose meds.  Vanco dosed by level  - Nephrology f/u appreciated.  On HD      Hypertension:    - Cont home BP meds with hold parameters    DM II:    - HgA1c, monitor FS, Cont insulin regimen    Dr. Carolina Pittman covering 1/15 and 1/16.  [phone]

## 2022-01-14 NOTE — CONSULT NOTE ADULT - CONSULT REQUESTED DATE/TIME
13-Jan-2022 19:21
14-Jan-2022 13:31
14-Jan-2022 14:12
14-Jan-2022 17:00
13-Jan-2022 18:32
14-Jan-2022 12:01

## 2022-01-14 NOTE — CONSULT NOTE ADULT - ATTENDING COMMENTS
a/w right foot OM  #ESKD TThSat  -last hd yesterday at [facility]  -no urgent indication for HD today; plan HD 1/15/22  #access: lavf  #anemia in ckd- confirm outpt LUX dosing; monitor hb level  #hyperphosphatemia- check phos; phos binders  #HTN-cont BP meds  #PVD- FOr angiogram by vascular; podiatry follow up
VARSHA Malone MD performed a history and physical exam of the patient and discussed  the findings and plan with the house officer. I reviewed the resident note and agree with the findings and plan   I Leonardo Malone MD have personally seen and examined the patient at bedside today at  7 pm

## 2022-01-14 NOTE — PROGRESS NOTE ADULT - SUBJECTIVE AND OBJECTIVE BOX
Subjective:  Patient seen at bedside this AM. Reports feeling well, without complaints. Denies chest pain, SOB.      24h Events:   - Overnight, no acute events    Objective:  Vital Signs  T(C): 36.6 (01-14 @ 07:49), Max: 37.1 (01-13 @ 12:54)  HR: 67 (01-14 @ 07:49) (65 - 94)  BP: 119/67 (01-14 @ 07:49) (110/60 - 136/80)  RR: 16 (01-14 @ 07:49) (16 - 18)  SpO2: 97% (01-14 @ 07:49) (97% - 99%)    Physical Exam:  GEN: resting in bed comfortably in NAD  NEURO: awake, alert  RESP: no increased WOB  EXTR: R 1st toe with gangrenous wound on distal aspect of toe, no malodor or purulence, no s/s of wet conversion      Labs:             9.7    5.91  )-----------( 182      ( 14 Jan 2022 04:17 )             31.1     139  |  96  |  30<H>  ----------------------------<  131<H>  3.7   |  29  |  5.59<H>    Ca    9.4      14 Jan 2022 04:17    TPro  8.4<H>  /  Alb  4.4  /  TBili  0.4  /  DBili  x   /  AST  11  /  ALT  12  /  AlkPhos  112  01-13    POCT Blood Glucose.: 109 mg/dL (14 Jan 2022 07:55)  POCT Blood Glucose.: 140 mg/dL (13 Jan 2022 22:21)  POCT Blood Glucose.: 212 mg/dL (13 Jan 2022 19:35)      Medications:   MEDICATIONS  (STANDING):  aspirin  chewable 81 milliGRAM(s) Oral daily  atorvastatin 40 milliGRAM(s) Oral at bedtime  carvedilol 6.25 milliGRAM(s) Oral every 12 hours  dextrose 40% Gel 15 Gram(s) Oral once  dextrose 5%. 1000 milliLiter(s) (50 mL/Hr) IV Continuous <Continuous>  dextrose 5%. 1000 milliLiter(s) (100 mL/Hr) IV Continuous <Continuous>  dextrose 50% Injectable 25 Gram(s) IV Push once  dextrose 50% Injectable 12.5 Gram(s) IV Push once  dextrose 50% Injectable 25 Gram(s) IV Push once  glucagon  Injectable 1 milliGRAM(s) IntraMuscular once  heparin   Injectable 5000 Unit(s) SubCutaneous every 8 hours  insulin glargine Injectable (LANTUS) 10 Unit(s) SubCutaneous at bedtime  insulin lispro (ADMELOG) corrective regimen sliding scale   SubCutaneous three times a day before meals  lisinopril 2.5 milliGRAM(s) Oral daily  midodrine. 5 milliGRAM(s) Oral <User Schedule>  Nephro-bridger 1 Tablet(s) Oral daily  piperacillin/tazobactam IVPB.. 3.375 Gram(s) IV Intermittent every 12 hours

## 2022-01-14 NOTE — CONSULT NOTE ADULT - PROBLEM SELECTOR RECOMMENDATION 9
Pt. with ESRD on HD three times a week (TTS) presented to [facility] for R toe gangrene. Last HD was on Tuesday via LUE AVF. Pt. clinically stable. No CP, SOB or palpitations during evaluation. HD consent obtained from pt, placed in chart. Labs reviewed. Will arrange for HD tomorrow. Dose meds as per HD.
VARSHA Malone MD performed a history and physical exam of the patient and discussed  the findings and plan with the house officer. I reviewed the resident note and agree with the findings and plan   I Leonardo Malone MD have personally seen and examined the patient at bedside today at  7 pm

## 2022-01-14 NOTE — CONSULT NOTE ADULT - PROBLEM SELECTOR RECOMMENDATION 3
Patient with anemia in the setting of ESRD. Hemoglobin mildly below target range (9.7). Will need to determine if patient receives LUX. Monitor hemoglobin.

## 2022-01-14 NOTE — PROGRESS NOTE ADULT - SUBJECTIVE AND OBJECTIVE BOX
Patient is a 51y old  Male who presents with a chief complaint of     SUBJECTIVE / OVERNIGHT EVENTS: day 2 ZOSYN, awaiting angiogram in cath lab today    MEDICATIONS  (STANDING):  aspirin  chewable 81 milliGRAM(s) Oral daily  atorvastatin 40 milliGRAM(s) Oral at bedtime  carvedilol 6.25 milliGRAM(s) Oral every 12 hours  dextrose 40% Gel 15 Gram(s) Oral once  dextrose 5%. 1000 milliLiter(s) (50 mL/Hr) IV Continuous <Continuous>  dextrose 5%. 1000 milliLiter(s) (100 mL/Hr) IV Continuous <Continuous>  dextrose 50% Injectable 25 Gram(s) IV Push once  dextrose 50% Injectable 12.5 Gram(s) IV Push once  dextrose 50% Injectable 25 Gram(s) IV Push once  glucagon  Injectable 1 milliGRAM(s) IntraMuscular once  heparin   Injectable 5000 Unit(s) SubCutaneous every 8 hours  insulin glargine Injectable (LANTUS) 10 Unit(s) SubCutaneous at bedtime  insulin lispro (ADMELOG) corrective regimen sliding scale   SubCutaneous three times a day before meals  lisinopril 2.5 milliGRAM(s) Oral daily  midodrine. 5 milliGRAM(s) Oral <User Schedule>  Nephro-bridger 1 Tablet(s) Oral daily  piperacillin/tazobactam IVPB.. 3.375 Gram(s) IV Intermittent every 12 hours    MEDICATIONS  (PRN):      Vital Signs Last 24 Hrs  T(F): 97.9 (01-14-22 @ 07:49), Max: 98.7 (01-13-22 @ 12:54)  HR: 67 (01-14-22 @ 07:49) (65 - 94)  BP: 119/67 (01-14-22 @ 07:49) (110/60 - 136/80)  RR: 16 (01-14-22 @ 07:49) (16 - 18)  SpO2: 97% (01-14-22 @ 07:49) (97% - 99%)  Telemetry:   CAPILLARY BLOOD GLUCOSE      POCT Blood Glucose.: 119 mg/dL (14 Jan 2022 10:58)  POCT Blood Glucose.: 109 mg/dL (14 Jan 2022 07:55)  POCT Blood Glucose.: 140 mg/dL (13 Jan 2022 22:21)  POCT Blood Glucose.: 212 mg/dL (13 Jan 2022 19:35)    I&O's Summary      PHYSICAL EXAM:  GENERAL: NAD, well-developed  HEAD:  Atraumatic, Normocephalic  EYES: EOMI, PERRLA, conjunctiva and sclera clear  NECK: Supple, No JVD  CHEST/LUNG: Clear to auscultation bilaterally; No wheeze  HEART: Regular rate and rhythm; No murmurs, rubs, or gallops  ABDOMEN: Soft, Nontender, Nondistended; Bowel sounds present  EXTREMITIES:  2+ Peripheral Pulses, No clubbing, cyanosis, or edema  PSYCH: AAOx3  NEUROLOGY: non-focal  SKIN: No rashes or lesions    LABS:                        9.7    5.91  )-----------( 182      ( 14 Jan 2022 04:17 )             31.1     01-14    139  |  96  |  30<H>  ----------------------------<  131<H>  3.7   |  29  |  5.59<H>    Ca    9.4      14 Jan 2022 04:17    TPro  8.4<H>  /  Alb  4.4  /  TBili  0.4  /  DBili  x   /  AST  11  /  ALT  12  /  AlkPhos  112  01-13    PT/INR - ( 14 Jan 2022 04:17 )   PT: 13.9 sec;   INR: 1.17 ratio         PTT - ( 14 Jan 2022 04:17 )  PTT:34.5 sec  CARDIAC MARKERS ( 13 Jan 2022 18:45 )  x     / x     / 39 U/L / x     / 2.8 ng/mL          RADIOLOGY & ADDITIONAL TESTS:    Imaging Personally Reviewed:    Consultant(s) Notes Reviewed:      Care Discussed with Consultants/Other Providers:

## 2022-01-14 NOTE — PROGRESS NOTE ADULT - ASSESSMENT
52yo M w/ right foot hallux wound to bone  - pt seen and evaluated  - afebrile, WBC 5.98, ESR 75, CRP 1.1  - Right foot hallux nailbed wound probing to bone, improved erythema to MPJ, dactylitis of hallux, no purulence, no malodor, no tracking, no fluctuance   - Right foot xray showing hallux distal phalanx OM, no gas   - Right foot wound culture taken  - Vasc planning RLE angio today, recs appreciated   - Recommend nephro consult  - Pod plan for right foot partial 1st ray resection tentatively booked for Monday 1/17 at 3pm w/ Dr. Nixon pending [facility] recs   - please document medical optimization for surgery under light sedation w/ local anesthesia   - discussed w/ attending

## 2022-01-14 NOTE — CONSULT NOTE ADULT - SUBJECTIVE AND OBJECTIVE BOX
2017      RE: Jerica Blas  [address] [facility] [address]       Diagnosis/Summary/Recommendations:    PATIENT: Jerica Blas    : 1945    PANKAJ: May 30, 2017    Parkinsonism  Sinemet 50/200 1/2 tab at midnight  Sinemet 25/100  1.5 TABS @9-10AM, 1.5 TABS @1-2PM AND 1.5 TABS @5-6PM AND 1 TAB @ 9-10PM   Selegiline 5MG - 2 TABS @ 9-10AM   mirapex 1mg WEANED OFF THIS.     Cognitive impairment  Donepezil 10mg  AT 9-10PM    Orthostatic hypotension  Proamatine/midodrine 2.5mg 3/day @ 9AM, 5PM AND 12 MIDNIGHT.     Hallucinations  seroquel 25mg 1 /2 TAB @ 1-2PM AND 1 TAB @ MIDNIGHT.     Cholesterol  Simvastatin 1/2 TAB AT BEDTIME    MERCAPTOPURINE 50MG IN THE MORNING.     She has had spells where she stops with swallowing beverages.     Plan  Change the midodrine at midnight to 1-2pm so it will be 1 tab @ 9-10am, 1 tab @ 1-2pm and 1 tab @ 5-6pm    Secondly change the selegiline from 2 tabs @ 9-10am to 1 tab @ 9-10am and 1 tab @ 1-2pm    No other changes  Remain off the mirapex  Continue the other medications as written.     Return back in 6 months.    Probably has a lipoma on the back of her neck.     Ray garcia MD     ______________________________________    Cc: 71 year old female   Issues discussed today May 30, 2017       -     Answers for HPI/ROS submitted by the patient on 2017   General Symptoms: No  Skin Symptoms: No  HENT Symptoms: No  EYE SYMPTOMS: No  HEART SYMPTOMS: No  LUNG SYMPTOMS: No  INTESTINAL SYMPTOMS: No  URINARY SYMPTOMS: No  GYNECOLOGIC SYMPTOMS: No  BREAST SYMPTOMS: No  SKELETAL SYMPTOMS: No  BLOOD SYMPTOMS: No  NERVOUS SYSTEM SYMPTOMS: No  MENTAL HEALTH SYMPTOMS: No          Over 50% of this visit was spent in patient care and care coordination.     History obtained from patient and family    Total visit time was 25 minutes      Ray Garcia MD     ______________________________________    Last visit date and details:      Parkinsonism  Sinemet : 1.5 tbs @ 7a,  11a, 4pm, and 1-1.5 tabs @7pm  Sinemet 50/200:  1/2 at bedtime   mirapex 1mg 1/2 tab @ 7a, 11 and 4pm  Selegiline 5mg in am and noon.     Cognitive disorder  Donepezil 10mg /day     Hallucinations  seroquel 25mg 1/2 tab @ 11a and 1 tab @ 10pm     On proamatine/midodrine  She is on 2.5mg three times per day   She had a very low  bp today when seated  She ha had orthostatic drops when seen by Dr. Villagomez  122/70 seated and 70/50 standing.                 Fabby Logan, [facility] at 10/31/2016  2:26 PM       Status: Signed              Expand All Collapse All    Social Work:  D/I: Request to contact Pt's dtr Lily re home care services. Lily indicated that her father was paying for [facility] home care ([facility]) to provide respite care while he went fishing or golfing during the summer. It is too costly for him to continue it thru the winter. She wondered if Medicare would pay for this. Discussed skilled home care criteria under Medicare coverage. She has not had this before and may benefit from it at some time, [facility] home PT/OT. Discussed the limitations of Medicare coverage home care and that most people who need [facility] care (bathing dressing grooming, mobility, etc) either need to pay for it from their own savings or apply to services thru their county to have it funded thru the county. Discussed getting a Long Term Care consultation visit at home. Discussed [facility] and they are over assets for that program. She inquired about whether I could contact her father to discuss this. Dtr reported that she and her sibling both have young children and it's difficult for them to help in the care of Jerica.  I contacted Reinaldo. Acknowledged the challenges in 24/7 caregiving. We discussed home care funding and he was aware of not qualifying for funding and is trying to preserve their finances for the future. They have some services from [facility] home care that he used this summer and it came with a nurse who visited and did check  Jerica's blood pressure. Discussed the LTC assessment thru [facility] to get the asset assessment done and clarity on qualifications for any programs. He plans to wait for now. Discussed Medicare home care and that they could use it when needed in the future for skilled visits.  A/P: Caregiver fatigue. He has to determine how best to use their funds.  He has my contact info and encouraged him to contact me as needed in the future.                SUMMARY  She continues to be relatively stable in regards to her mobility     Her blood pressure seems to be better managed with her proamatine 2.5mg 3/day  Discussed other options for blood pressure if needed  A. Fluid bolus of 8oz or 16oz before activities  B. Extra proamatine if needed in the morning or mid day  C. Mestinon add on  D. Florinef/fludrocortisone  Her  will decide if he will see nephrologist Dr. Yuan Mcdonald about her BP/renal issues.      At this point she and her  seem okay with the present medication regimen and is not wanting to change thins at this poin     Her regards to mobility she will remain on her sinemet, sinemet CR, mirapex and selegiline  If there are significant changes in function we may consider backing down off the mirapex in the future. And consider having her go off the selegiline. But we both agreed to keep the regimen the same     In regards to her hallucinations these continue but are not troubling and she is on seroquel and not needing to have a dose increase. We discussed nuplazid/pimavanserin as a recently approved medication for these issues.      There is some respite one day a week during the summer so he can go fishing.     Return back in 6 months.      Ray Bravo MD        ______________________________________      Patient was asked about 14 Review of systems including changes in vision (dry eyes, double vision), hearing, heart, lungs, musculoskeletal, depression, anxiety, snoring, RBD, insomnia, urinary  frequency, urinary urgency, constipation, swallowing problems, hematological, ID, allergies, skin problems: seborrhea, endocrinological: thyroid, diabetes, cholesterol; balance, weight changes, and other neurological problems and these were not significant at this time except for   Patient Active Problem List   Diagnosis     Parkinsonism (H)     Dementia     Crohn disease (H)     Fistula     Nocturia     REM sleep behavior disorder     Over weight     Vitamin D deficiency     Wears glasses     Glaucoma     Hypercholesteremia     Constipation     Finger fracture     Foot fracture     Confusion     Bunion     Cracked skin     Hand pain     Macrocytosis     Chronic renal disease          Allergies   Allergen Reactions     Demerol      Hay Fever & [A.R.M.]      Namenda [Memantine Hydrochloride]      No benefit       Requip [Ropinirole Hydrochloride]      ? confusion     Past Surgical History:   Procedure Laterality Date     BIOPSY OF SKIN LESION  october 2014    seborrheic keratosis of left face     EYE SURGERY  jan 2012    left cataract     EYE SURGERY  march 2012    right cataract     fistula repair?       Past Medical History:   Diagnosis Date     Bunion 3/27/2012     Chronic renal disease 9/24/2013     Confusion 9/6/2011     Constipation     1/week     Constipation 9/6/2011     Cracked skin 3/27/2012     Crohn disease (H) 9/2/2011     Crohn's     mercaptopurine     Disorientation     denies hallucinations. Has memory problems     Entero-colonic fistula 2000    related to crohns; [facility]; had a prior one      Falls     once every 3 months     Finger fracture 9/6/2011     Finger fracture, right      Fistula 9/2/2011     Foot fracture      Freezing      Glaucoma     uses travatan and timolol     Glaucoma 9/6/2011     Hand pain 3/27/2012     Hypercholesteremia 9/6/2011     Hypercholesterolaemia     on simvastatin     Macrocytosis 9/24/2013     Nocturia     1-3/noc     Over weight     with pd has gained 55 lbs      Parkinson's disease 1998    left side onset; left handed. dyskinesias     REM sleep behavior disorder     not frequent     Screening colonoscopy      Vitamin D deficiency      Wears glasses      Social History     Social History     Marital status:      Spouse name: N/A     Number of children: N/A     Years of education: N/A     Occupational History     Not on file.     Social History Main Topics     Smoking status: Never Smoker     Smokeless tobacco: Never Used     Alcohol use No     Drug use: Not on file     Sexual activity: Not on file     Other Topics Concern     Not on file     Social History Narrative    Living in [place]    Not exercising    No tobacco    No alcohol        FAMILY HISTORY:  Her father  in his 70s from alcoholism.  Her mother is alive and age 94.  She has two sisters and one brother who are healthy.  She has two daughters who are 33 and 35.  She is of [place] background.        SOCIAL HISTORY:  She was born in [place] and grew up in the [place] and then [place].  She came to [place] after college.  She has been  since , i.e., 39 years.  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       Drug and lactation database from the [place] National Library of Medicine:  http://toxnet.nlm.nih.gov/cgi-bin/sis/htmlgen?LACT      B/P: Data Unavailable, T: Data Unavailable, P: Data Unavailable, R: Data Unavailable 0 lbs 0 oz  There were no vitals taken for this visit., There is no height or weight on file to calculate BMI.  Medications and Vitals not listed are documented in the cart and reviewed by me.     Current Outpatient Prescriptions   Medication Sig Dispense Refill     pramipexole (MIRAPEX) 1 MG tablet Take 1/2 tab at 9 am and 1 pm for 5 days, then 1/2 tab at 9 am for 5 days, then stop. 1 tablet 0     midodrine (PROAMATINE) 2.5 MG tablet 1 tab @ 9am, 1pm and 5pm 270 tablet 3     melatonin 3 MG tablet 1 tablet  at 9pm 90 tablet 3     carbidopa-levodopa (SINEMET CR)  MG per tablet Take 1/2 tab at midnight 45 tablet 3     carbidopa-levodopa (SINEMET)  MG per tablet 1.5 TAB at 9AM, 1pm, and 5PM and  Take 1 to 1.5 tablets @ 9pm 540 tablet 3     QUEtiapine (SEROQUEL) 25 MG tablet 1/2 tab @ 1pm and 1 tablet at 12 midnight 135 tablet 3     donepezil (ARICEPT) 10 MG tablet Take  by mouth. 1 tab at 9pm 90 tablet 11     selegiline (ELDEPRYL) 5 MG capsule 1 tab @ 7am, 1 tab @ 1pm 180 capsule 3     Urea (CARMOL 40) 40 % CREA Externally apply  topically. Apply on cracked skin as directed - 1 Tube 11     sennosides (SENNA) 8.6 MG tablet Take 1 tablet by mouth daily as needed for constipation. 100 tablet 3     Calcium Carb-Cholecalciferol (CALCIUM 500 +D PO) Take 1 tablet by mouth daily.       simvastatin (ZOCOR) 20 MG tablet Take 0.5 tablets by mouth At Bedtime.       cholecalciferol (VITAMIN D) 1000 UNIT tablet Take 3 tablets by mouth daily.       mercaptopurine (PURINETHOL) 50 MG tablet Take 1 tablet by mouth daily. 90 tablet 3         Ray Bravo MD       [facility] DIVISION OF KIDNEY DISEASES AND HYPERTENSION -- [phone]  -- INITIAL CONSULT NOTE  --------------------------------------------------------------------------------  HPI: Patient is a 51 year old female with past medical history of ESRD on HD TTS, DM2, CAD, CHF, foot ulcer, CABG, AICD presented to [facility] from podiatry office for R 1st toe gangrene eval. Admitted for further management. Nephrology consulted for ESRD management. Pt. receives his outpatient maintenance HD treatments via LUE AVF at [facility] and [facility], and his outpatient nephrologist is Tom. Last HD was Tuesday.     Patient was seen and examined at bedside. Reported feeling well. Denies CP or SOB.     PAST HISTORY  --------------------------------------------------------------------------------  PAST MEDICAL & SURGICAL HISTORY:  Diabetes mellitus  type 2    Foot ulcer due to secondary DM    Coronary artery disease    Acute on chronic systolic heart failure    Breast Reduction  at age 18    Toe amputation status, left    S/P CABG (coronary artery bypass graft)    AICD (automatic cardioverter/defibrillator) present    FAMILY HISTORY:  Family history of diabetes mellitus (Father)      PAST SOCIAL HISTORY:    ALLERGIES & MEDICATIONS  --------------------------------------------------------------------------------  Allergies    No Known Allergies    Intolerances    Standing Inpatient Medications  aspirin  chewable 81 milliGRAM(s) Oral daily  atorvastatin 40 milliGRAM(s) Oral at bedtime  carvedilol 6.25 milliGRAM(s) Oral every 12 hours  dextrose 40% Gel 15 Gram(s) Oral once  dextrose 5%. 1000 milliLiter(s) IV Continuous <Continuous>  dextrose 5%. 1000 milliLiter(s) IV Continuous <Continuous>  dextrose 50% Injectable 25 Gram(s) IV Push once  dextrose 50% Injectable 12.5 Gram(s) IV Push once  dextrose 50% Injectable 25 Gram(s) IV Push once  glucagon  Injectable 1 milliGRAM(s) IntraMuscular once  heparin   Injectable 5000 Unit(s) SubCutaneous every 8 hours  insulin glargine Injectable (LANTUS) 10 Unit(s) SubCutaneous at bedtime  insulin lispro (ADMELOG) corrective regimen sliding scale   SubCutaneous three times a day before meals  lisinopril 2.5 milliGRAM(s) Oral daily  midodrine. 5 milliGRAM(s) Oral <User Schedule>  Nephro-bridger 1 Tablet(s) Oral daily  piperacillin/tazobactam IVPB.. 3.375 Gram(s) IV Intermittent every 12 hours    PRN Inpatient Medications    REVIEW OF SYSTEMS  --------------------------------------------------------------------------------  Gen: No fevers/chills  Respiratory: No dyspnea, cough  CV: No chest pain  GI: No abdominal pain, diarrhea  MSK: No  edema    All other systems were reviewed and are negative, except as noted.    VITALS/PHYSICAL EXAM  --------------------------------------------------------------------------------  T(C): 36.7 (01-14-22 @ 12:12), Max: 36.8 (01-14-22 @ 12:02)  HR: 70 (01-14-22 @ 12:12) (65 - 80)  BP: 137/68 (01-14-22 @ 12:12) (110/60 - 137/68)  RR: 19 (01-14-22 @ 12:12) (16 - 19)  SpO2: 98% (01-14-22 @ 12:12) (97% - 99%)  Wt(kg): --  Height (cm): 175.3 (01-13-22 @ 12:54)  Weight (kg): 90.7 (01-13-22 @ 12:54)  BMI (kg/m2): 29.5 (01-13-22 @ 12:54)  BSA (m2): 2.07 (01-13-22 @ 12:54)    Physical Exam:  	Gen: NAD  	HEENT: MMM  	Pulm: CTA B/L, no crackles   	CV: S1S2  	Abd: Soft, +BS   	Ext: No LE edema B/L  	Neuro: Awake  	Skin: Warm and dry  	Vascular access: LUE AVF with good bruit and palpable thrills     LABS/STUDIES  --------------------------------------------------------------------------------              9.7    5.91  >-----------<  182      [01-14-22 @ 04:17]              31.1     139  |  96  |  30  ----------------------------<  131      [01-14-22 @ 04:17]  3.7   |  29  |  5.59        Ca     9.4     [01-14-22 @ 04:17]    TPro  8.4  /  Alb  4.4  /  TBili  0.4  /  DBili  x   /  AST  11  /  ALT  12  /  AlkPhos  112  [01-13-22 @ 18:45]    PT/INR: PT 13.9 , INR 1.17       [01-14-22 @ 04:17]  PTT: 34.5       [01-14-22 @ 04:17]    CK 39      [01-13-22 @ 18:45]    Creatinine Trend:  SCr 5.59 [01-14 @ 04:17]  SCr 4.74 [01-13 @ 18:45]    Urinalysis - [05-29-17 @ 15:30]      Color Yellow / Appearance Clear / SG 1.016 / pH 5.5      Gluc Negative / Ketone Negative  / Bili Negative / Urobili Negative       Blood Negative / Protein 300 / Leuk Est Small / Nitrite Negative      RBC 0-2 / WBC 3-5 / Hyaline  / Gran  / Sq Epi  / Non Sq Epi OCC / Bacteria       HbA1c 6.4      [04-27-17 @ 07:23]    HBsAb <3.0      [05-30-17 @ 08:37]  HBsAg Nonreact      [05-30-17 @ 08:37]  HCV 0.36, Nonreact      [05-30-17 @ 08:37]

## 2022-01-14 NOTE — PROGRESS NOTE ADULT - SUBJECTIVE AND OBJECTIVE BOX
Podiatry pager #: [phone] ([place])/ [phone] ([facility])    Patient is a 51y old  Male who presents with a chief complaint of      INTERVAL HPI/OVERNIGHT EVENTS:  Patient seen and evaluated at bedside.  Pt is resting comfortable in NAD. Denies N/V/F/C.     Allergies    No Known Allergies    Intolerances        Vital Signs Last 24 Hrs  T(C): 36.7 (14 Jan 2022 12:12), Max: 36.8 (14 Jan 2022 12:02)  T(F): 98 (14 Jan 2022 12:12), Max: 98.2 (14 Jan 2022 12:02)  HR: 70 (14 Jan 2022 12:12) (65 - 80)  BP: 137/68 (14 Jan 2022 12:12) (110/60 - 137/68)  BP(mean): 80 (14 Jan 2022 04:15) (80 - 80)  RR: 19 (14 Jan 2022 12:12) (16 - 19)  SpO2: 98% (14 Jan 2022 12:12) (97% - 99%)    LABS:                        9.7    5.91  )-----------( 182      ( 14 Jan 2022 04:17 )             31.1     01-14    139  |  96  |  30<H>  ----------------------------<  131<H>  3.7   |  29  |  5.59<H>    Ca    9.4      14 Jan 2022 04:17    TPro  8.4<H>  /  Alb  4.4  /  TBili  0.4  /  DBili  x   /  AST  11  /  ALT  12  /  AlkPhos  112  01-13    PT/INR - ( 14 Jan 2022 04:17 )   PT: 13.9 sec;   INR: 1.17 ratio         PTT - ( 14 Jan 2022 04:17 )  PTT:34.5 sec    CAPILLARY BLOOD GLUCOSE      POCT Blood Glucose.: 119 mg/dL (14 Jan 2022 10:58)  POCT Blood Glucose.: 109 mg/dL (14 Jan 2022 07:55)  POCT Blood Glucose.: 140 mg/dL (13 Jan 2022 22:21)  POCT Blood Glucose.: 212 mg/dL (13 Jan 2022 19:35)      Lower Extremity Physical Exam:    Vascular: DP0/4, PT 1/4 B/L, CFT <3 seconds B/L, Temperature gradient warm to cool B/L  Neuro: Epicritic sensation absent to the level of digits B/L  Musculoskeletal/Ortho: unremarkable   Skin: Right foot hallux nailbed wound probing to bone, improved erythema to MPJ, dactylitis of hallux, no purulence, no malodor, no tracking, no fluctuance     RADIOLOGY & ADDITIONAL TESTS:  < from: Xray Foot AP + Lateral + Oblique, Right (01.13.22 @ 20:30) >    ACC: [id number] EXAM:  XR FOOT COMP MIN 3 VIEWS RT                          PROCEDURE DATE:  01/13/2022          INTERPRETATION:  CLINICAL INDICATION: right foot hallux wound; evaluate   for osteomyelitis    EXAM:  Frontal, oblique, and lateral rightfoot from 1/13/2022 at 2030. Compared   to prior study from 6/1/2011.    IMPRESSION:  Hallux soft tissue swelling with tip ulceration. Eroded indistinctly   marginated underlying hallux distal phalanx tuft to mid shaft level   consistent with changesof osteomyelitis. No tracking gas collections or   additional areas of osteomyelitis.    No fractures or dislocations.    Tarsometatarsal alignment maintained without evidence for a Lisfranc   injury.    Preserved visualized joint spaces and no jointmargin erosions.    No lytic or blastic lesions.    Vascular calcifications.    --- End of Report ---            BLAKE RUST MD; Attending Radiologist  This document has been electronically signed. Jan 14 2022  8:56AM    < end of copied text >

## 2022-01-14 NOTE — CONSULT NOTE ADULT - SUBJECTIVE AND OBJECTIVE BOX
Patient is a 51y old  Male who presents with a chief complaint of     HPI:  52yo M with PMH of DM, ESRD TTS presents to ED from Dr Myers podiatry office for eval of R 1st toe gangrene.   Started with ulcer 3 wks ago, initially placed on abx for last 10d which he completed. Today went to podiatry and found that ulcer was still open and when probed, went to bone. Painless as pt has no sensation in toes due to diabetes. No fever but did have chills. No streaking up leg. Had previous bone removal in L 5th toe 2/2 OM. Otherwise feels well. No CP, SOB, cough, abd pain,fever, chills (13 Jan 2022 20:10)  Pt at cardiac angiogram at time of bedside visit.       REVIEW OF SYSTEMS:    u/a      PAST MEDICAL & SURGICAL HISTORY:  Diabetes mellitus  type 2    Foot ulcer due to secondary DM    Coronary artery disease    Acute on chronic systolic heart failure    Breast Reduction  at age 18    Toe amputation status, left    S/P CABG (coronary artery bypass graft)    AICD (automatic cardioverter/defibrillator) present        Allergies    No Known Allergies    Intolerances        FAMILY HISTORY:  Family history of diabetes mellitus (Father)        SOCIAL HISTORY:    u/a    MEDICATIONS  (STANDING):  aspirin  chewable 81 milliGRAM(s) Oral daily  atorvastatin 40 milliGRAM(s) Oral at bedtime  carvedilol 6.25 milliGRAM(s) Oral every 12 hours  dextrose 40% Gel 15 Gram(s) Oral once  dextrose 5%. 1000 milliLiter(s) (50 mL/Hr) IV Continuous <Continuous>  dextrose 5%. 1000 milliLiter(s) (100 mL/Hr) IV Continuous <Continuous>  dextrose 50% Injectable 25 Gram(s) IV Push once  dextrose 50% Injectable 12.5 Gram(s) IV Push once  dextrose 50% Injectable 25 Gram(s) IV Push once  glucagon  Injectable 1 milliGRAM(s) IntraMuscular once  heparin   Injectable 5000 Unit(s) SubCutaneous every 8 hours  insulin glargine Injectable (LANTUS) 10 Unit(s) SubCutaneous at bedtime  insulin lispro (ADMELOG) corrective regimen sliding scale   SubCutaneous three times a day before meals  lisinopril 2.5 milliGRAM(s) Oral daily  midodrine. 5 milliGRAM(s) Oral <User Schedule>  mupirocin 2% Ointment 1 Application(s) Topical two times a day  Nephro-bridger 1 Tablet(s) Oral daily  piperacillin/tazobactam IVPB.. 3.375 Gram(s) IV Intermittent every 12 hours    MEDICATIONS  (PRN):      Vital Signs Last 24 Hrs  T(C): 36.8 (14 Jan 2022 14:35), Max: 36.8 (14 Jan 2022 12:02)  T(F): 98.2 (14 Jan 2022 14:35), Max: 98.2 (14 Jan 2022 12:02)  HR: 66 (14 Jan 2022 14:35) (65 - 91)  BP: 144/60 (14 Jan 2022 14:35) (110/60 - 144/60)  BP(mean): 80 (14 Jan 2022 04:15) (80 - 80)  RR: 16 (14 Jan 2022 14:35) (16 - 19)  SpO2: 99% (14 Jan 2022 14:35) (97% - 99%)    PHYSICAL EXAM:    u/a    LABS:  CBC Full  -  ( 14 Jan 2022 04:17 )  WBC Count : 5.91 K/uL  RBC Count : 3.28 M/uL  Hemoglobin : 9.7 g/dL  Hematocrit : 31.1 %  Platelet Count - Automated : 182 K/uL  Mean Cell Volume : 94.8 fl  Mean Cell Hemoglobin : 29.6 pg  Mean Cell Hemoglobin Concentration : 31.2 gm/dL  Auto Neutrophil # : x  Auto Lymphocyte # : x  Auto Monocyte # : x  Auto Eosinophil # : x  Auto Basophil # : x  Auto Neutrophil % : x  Auto Lymphocyte % : x  Auto Monocyte % : x  Auto Eosinophil % : x  Auto Basophil % : x      01-14    139  |  96  |  30<H>  ----------------------------<  131<H>  3.7   |  29  |  5.59<H>    Ca    9.4      14 Jan 2022 04:17    TPro  8.4<H>  /  Alb  4.4  /  TBili  0.4  /  DBili  x   /  AST  11  /  ALT  12  /  AlkPhos  112  01-13      LIVER FUNCTIONS - ( 13 Jan 2022 18:45 )  Alb: 4.4 g/dL / Pro: 8.4 g/dL / ALK PHOS: 112 U/L / ALT: 12 U/L / AST: 11 U/L / GGT: x             MICROBIOLOGY:    COVID-19 PCR (01.13.22 @ 19:41)   COVID-19 PCR: NotDetec: You can help in the fight against COVID-19. [facility] may contact   you to see if you are interested in voluntarily participating in one of   our clinical trials.     RADIOLOGY:    < from: Xray Foot AP + Lateral + Oblique, Right (01.13.22 @ 20:30) >    IMPRESSION:  Hallux soft tissue swelling with tip ulceration. Eroded indistinctly   marginated underlying hallux distal phalanx tuft to mid shaft level   consistent with changesof osteomyelitis. No tracking gas collections or   additional areas of osteomyelitis.    No fractures or dislocations.    Tarsometatarsal alignment maintained without evidence for a Lisfranc   injury.    Preserved visualized joint spaces and no jointmargin erosions.    No lytic or blastic lesions.    Vascular calcifications.    < end of copied text >

## 2022-01-14 NOTE — CONSULT NOTE ADULT - PROBLEM SELECTOR RECOMMENDATION 2
BP at target range. Monitor BP on current BP medication. Low salt diet.
VARSHA Malone MD performed a history and physical exam of the patient and discussed  the findings and plan with the house officer. I reviewed the resident note and agree with the findings and plan   I Leonardo Malone MD have personally seen and examined the patient at bedside today at  7 pm

## 2022-01-14 NOTE — CONSULT NOTE ADULT - CONSULT REASON
Abx managment
ESRD management
R foot hallux wound to bone
right foot wound
Cardiac Clearance
Pre-Operative Cardiac Risk Stratification and Optimization

## 2022-01-15 LAB
ANION GAP SERPL CALC-SCNC: 19 MMOL/L — HIGH (ref 5–17)
BUN SERPL-MCNC: 42 MG/DL — HIGH (ref 7–23)
CALCIUM SERPL-MCNC: 9.9 MG/DL — SIGNIFICANT CHANGE UP (ref 8.4–10.5)
CHLORIDE SERPL-SCNC: 94 MMOL/L — LOW (ref 96–108)
CO2 SERPL-SCNC: 24 MMOL/L — SIGNIFICANT CHANGE UP (ref 22–31)
CREAT SERPL-MCNC: 7.63 MG/DL — HIGH (ref 0.5–1.3)
GLUCOSE BLDC GLUCOMTR-MCNC: 103 MG/DL — HIGH (ref 70–99)
GLUCOSE BLDC GLUCOMTR-MCNC: 108 MG/DL — HIGH (ref 70–99)
GLUCOSE BLDC GLUCOMTR-MCNC: 136 MG/DL — HIGH (ref 70–99)
GLUCOSE BLDC GLUCOMTR-MCNC: 138 MG/DL — HIGH (ref 70–99)
GLUCOSE BLDC GLUCOMTR-MCNC: 177 MG/DL — HIGH (ref 70–99)
GLUCOSE SERPL-MCNC: 107 MG/DL — HIGH (ref 70–99)
HCT VFR BLD CALC: 35.1 % — LOW (ref 39–50)
HGB BLD-MCNC: 10.9 G/DL — LOW (ref 13–17)
MAGNESIUM SERPL-MCNC: 2.5 MG/DL — SIGNIFICANT CHANGE UP (ref 1.6–2.6)
MCHC RBC-ENTMCNC: 29.7 PG — SIGNIFICANT CHANGE UP (ref 27–34)
MCHC RBC-ENTMCNC: 31.1 GM/DL — LOW (ref 32–36)
MCV RBC AUTO: 95.6 FL — SIGNIFICANT CHANGE UP (ref 80–100)
NRBC # BLD: 0 /100 WBCS — SIGNIFICANT CHANGE UP (ref 0–0)
PHOSPHATE SERPL-MCNC: 7.3 MG/DL — HIGH (ref 2.5–4.5)
PLATELET # BLD AUTO: 207 K/UL — SIGNIFICANT CHANGE UP (ref 150–400)
POTASSIUM SERPL-MCNC: 5 MMOL/L — SIGNIFICANT CHANGE UP (ref 3.5–5.3)
POTASSIUM SERPL-SCNC: 5 MMOL/L — SIGNIFICANT CHANGE UP (ref 3.5–5.3)
RBC # BLD: 3.67 M/UL — LOW (ref 4.2–5.8)
RBC # FLD: 17.8 % — HIGH (ref 10.3–14.5)
SODIUM SERPL-SCNC: 137 MMOL/L — SIGNIFICANT CHANGE UP (ref 135–145)
VANCOMYCIN FLD-MCNC: 13.8 UG/ML — SIGNIFICANT CHANGE UP
WBC # BLD: 7.72 K/UL — SIGNIFICANT CHANGE UP (ref 3.8–10.5)
WBC # FLD AUTO: 7.72 K/UL — SIGNIFICANT CHANGE UP (ref 3.8–10.5)

## 2022-01-15 RX ADMIN — CLOPIDOGREL BISULFATE 75 MILLIGRAM(S): 75 TABLET, FILM COATED ORAL at 12:26

## 2022-01-15 RX ADMIN — HEPARIN SODIUM 5000 UNIT(S): 5000 INJECTION INTRAVENOUS; SUBCUTANEOUS at 22:29

## 2022-01-15 RX ADMIN — MIDODRINE HYDROCHLORIDE 5 MILLIGRAM(S): 2.5 TABLET ORAL at 22:28

## 2022-01-15 RX ADMIN — ATORVASTATIN CALCIUM 40 MILLIGRAM(S): 80 TABLET, FILM COATED ORAL at 22:28

## 2022-01-15 RX ADMIN — LISINOPRIL 2.5 MILLIGRAM(S): 2.5 TABLET ORAL at 06:13

## 2022-01-15 RX ADMIN — HEPARIN SODIUM 5000 UNIT(S): 5000 INJECTION INTRAVENOUS; SUBCUTANEOUS at 13:54

## 2022-01-15 RX ADMIN — Medication 1 TABLET(S): at 12:26

## 2022-01-15 RX ADMIN — HEPARIN SODIUM 5000 UNIT(S): 5000 INJECTION INTRAVENOUS; SUBCUTANEOUS at 06:13

## 2022-01-15 RX ADMIN — PIPERACILLIN AND TAZOBACTAM 25 GRAM(S): 4; .5 INJECTION, POWDER, LYOPHILIZED, FOR SOLUTION INTRAVENOUS at 22:29

## 2022-01-15 RX ADMIN — CILOSTAZOL 50 MILLIGRAM(S): 100 TABLET ORAL at 22:29

## 2022-01-15 RX ADMIN — INSULIN GLARGINE 10 UNIT(S): 100 INJECTION, SOLUTION SUBCUTANEOUS at 22:28

## 2022-01-15 RX ADMIN — CARVEDILOL PHOSPHATE 6.25 MILLIGRAM(S): 80 CAPSULE, EXTENDED RELEASE ORAL at 22:29

## 2022-01-15 RX ADMIN — CILOSTAZOL 50 MILLIGRAM(S): 100 TABLET ORAL at 06:12

## 2022-01-15 RX ADMIN — PIPERACILLIN AND TAZOBACTAM 25 GRAM(S): 4; .5 INJECTION, POWDER, LYOPHILIZED, FOR SOLUTION INTRAVENOUS at 09:26

## 2022-01-15 NOTE — PROGRESS NOTE ADULT - ASSESSMENT
left foot ulcer   on anbx  RLE angio via L CFA access, stenting of mid and distal SFA as well as popliteal and PT trunk, with mynx closure.  plan for partial foot amp by podiatry     CAD s/p cabg  stable   asa, statin    chronic systolic chf  stable  improved LV function   cont BB / ace    ESRD  On HD. follow up with renal. Monitor electrolytes, K, ca. Avoid significant nephrotoxic medications.    DM II  Monitor finger stick. Insulin coverage. Diabetic education and Diabetic diet. Consider nutrition consultation.    Pre-Operative Cardiac Risk Stratification and Optimization  Based on patient history and physical exam, the patient is considered to have elevated risk   recent echo in my office last week showed EF 50 percent   pt has no active or symptomatic cardiac issues to preclude planned podiatry surgery   can proceed with acceptable elevated risk for this low risk procedure

## 2022-01-15 NOTE — PROGRESS NOTE ADULT - SUBJECTIVE AND OBJECTIVE BOX
SUBJECTIVE/ROS:  Pt seen and examined early this am       MEDICATIONS:  MEDICATIONS  (STANDING):  atorvastatin 40 milliGRAM(s) Oral at bedtime  carvedilol 6.25 milliGRAM(s) Oral every 12 hours  cilostazol 50 milliGRAM(s) Oral every 12 hours  clopidogrel Tablet 75 milliGRAM(s) Oral daily  dextrose 40% Gel 15 Gram(s) Oral once  dextrose 5%. 1000 milliLiter(s) (50 mL/Hr) IV Continuous <Continuous>  dextrose 5%. 1000 milliLiter(s) (100 mL/Hr) IV Continuous <Continuous>  dextrose 50% Injectable 25 Gram(s) IV Push once  dextrose 50% Injectable 12.5 Gram(s) IV Push once  dextrose 50% Injectable 25 Gram(s) IV Push once  glucagon  Injectable 1 milliGRAM(s) IntraMuscular once  heparin   Injectable 5000 Unit(s) SubCutaneous every 8 hours  insulin glargine Injectable (LANTUS) 10 Unit(s) SubCutaneous at bedtime  insulin lispro (ADMELOG) corrective regimen sliding scale   SubCutaneous three times a day before meals  lisinopril 2.5 milliGRAM(s) Oral daily  midodrine. 5 milliGRAM(s) Oral <User Schedule>  mupirocin 2% Ointment 1 Application(s) Topical two times a day  Nephro-bridger 1 Tablet(s) Oral daily  piperacillin/tazobactam IVPB.. 3.375 Gram(s) IV Intermittent every 12 hours      PHYSICAL EXAM:  T(C): 37 (01-15-22 @ 06:09), Max: 37 (01-15-22 @ 06:09)  HR: 88 (01-15-22 @ 06:09) (66 - 89)  BP: 132/80 (01-15-22 @ 06:09) (122/72 - 167/77)  RR: 18 (01-15-22 @ 06:09) (15 - 19)  SpO2: 99% (01-15-22 @ 06:09) (95% - 99%)  Wt(kg): --  I&O's Summary    Height (cm): 175.3 (01-14 @ 14:35)  Weight (kg): 90.7 (01-14 @ 14:35)  BMI (kg/m2): 29.5 (01-14 @ 14:35)  BSA (m2): 2.07 (01-14 @ 14:35)      JVP: Normal  Neck: supple  Lung: clear   CV: S1 S2 , Murmur:  Abd: soft  Ext: No edema  neuro: Awake / alert  Psych: flat affect                              10.9   7.72  )-----------( 207      ( 15 Arnold 2022 07:02 )             35.1     01-15    137  |  94<L>  |  42<H>  ----------------------------<  107<H>  5.0   |  24  |  7.63<H>    Ca    9.9      15 Arnold 2022 07:02  Phos  7.3     01-15  Mg     2.5     01-15    TPro  8.4<H>  /  Alb  4.4  /  TBili  0.4  /  DBili  x   /  AST  11  /  ALT  12  /  AlkPhos  112  01-13

## 2022-01-15 NOTE — PATIENT PROFILE ADULT - FALL HARM RISK - RISK INTERVENTIONS
Assistance OOB with selected safe patient handling equipment/Assistance with ambulation/Communicate Fall Risk and Risk Factors to all staff, patient, and family/Monitor gait and stability/Reinforce activity limits and safety measures with patient and family/Sit up slowly, dangle for a short time, stand at bedside before walking/Use of alarms - bed, chair and/or voice tab/Visual Cue: Yellow wristband/Bed in lowest position, wheels locked, appropriate side rails in place/Call bell, personal items and telephone in reach/Instruct patient to call for assistance before getting out of bed or chair/Non-slip footwear when patient is out of bed/[place] to call system/Physically safe environment - no spills, clutter or unnecessary equipment/Purposeful Proactive Rounding/Room/bathroom lighting operational, light cord in reach

## 2022-01-15 NOTE — PROGRESS NOTE ADULT - ASSESSMENT
51M with PMH of DM, ESRD TTS (s/p L AVF by Dr. Malone in 2017) presents with right foot hallux wound to bone, s/p RLE angio via L CFA access, stenting of mid and distal SFA as well as popliteal and PT trunk, with mynx closure.    Plan:  - Pain control as needed  - DVT ppx, started on pletal and plavix  - Scheduled for hemodialysis tomorrow   - Podiatry for R foot partial 1st ray resection tentatively for Monday at 3pm   - F/u AM labs    Vascular Surgery  #[id number]    51M with PMH of DM, ESRD TTS (s/p L AVF by Dr. Malone in 2017) presents with right foot hallux wound to bone, s/p RLE angio via L CFA access, stenting of mid and distal SFA as well as popliteal and PT trunk, with mynx closure.    Plan:  - Pain control as needed  - DVT ppx, started on pletal and plavix  - Scheduled for hemodialysis tomorrow   - Podiatry for R foot partial 1st ray resection tentatively for Monday at 3pm.   - F/u AM labs    Vascular Surgery  #[id number]    51M with PMH of DM, ESRD TTS (s/p L AVF by Dr. Malone in 2017) presents with right foot hallux wound to bone, s/p RLE angio via L CFA access, stenting of mid and distal SFA as well as popliteal and PT trunk, with mynx closure.    Plan:  - Pain control as needed  - DVT ppx, started on pletal and plavix  - Scheduled for hemodialysis today  - Podiatry for R foot partial 1st ray resection tentatively for Monday at 3pm.   - F/u AM labs    Vascular Surgery  #[id number]

## 2022-01-15 NOTE — PROGRESS NOTE ADULT - ASSESSMENT
52yo M w/ h/o DM, CAD s/p CABG, h/o chronic systolic CHF but latest outptn EF improved to 50% here its 40% on this admission,  ESRD on HD, DIabetic neuropathy, doming in w diabetic foot ulcer with OM of right foot hallux wound to bone  - pt seen and evaluated by podiatry and vascular  -  ESR elevated   - Right foot hallux nailbed wound probing to bone, erythema to MPJ, dactylitis of hallux, no purulence, no malodor, no tracking, no fluctuance   - Right foot wound culture taken by podiatry. but not resulted in sunrise, blood Cx NTD. ID following, on Zosyn, s/p Vanco  - Pt seen by vascular as well:  Dr. Malone at bedside, s/p RLE angio via L CFA access, stenting of mid and distal SFA as well as popliteal and PT trunk, with mynx closure. placed on Pletal, Plavix, off ASA  - plan for partial foot amp by podiatry   - HD as per renal  - CAD/ systolic CHF stable, card following, on BB, ACE-I, ASA  - TTE reviewed EF 40%  - Pod plan for right foot partial 1st ray resection vs. partial hallux amputation   - DM, on Insulin on a sliding scale,numbers are stable  - dvt ppx w HSC  - cont outptn meds

## 2022-01-15 NOTE — PROGRESS NOTE ADULT - SUBJECTIVE AND OBJECTIVE BOX
SURGERY  Pager: [phone]    STATUS POST: RLE angio via L CFA access, stenting of mid and distal SFA as well as popliteal and PT trunk, with mynx closure    POST OPERATIVE DAY #1    INTERVAL EVENTS/SUBJECTIVE: No acute events overnight, recovering appropriately from procedure.     ______________________________________________  OBJECTIVE:   T(C): 36.8 (01-15-22 @ 01:00), Max: 36.9 (01-15-22 @ 00:00)  HR: 80 (01-15-22 @ 01:00) (65 - 91)  BP: 122/- (01-15-22 @ 01:00) (113/63 - 167/77)  RR: 18 (01-15-22 @ 01:00) (15 - 19)  SpO2: 97% (01-15-22 @ 01:00) (95% - 99%)  Wt(kg): --  CAPILLARY BLOOD GLUCOSE      POCT Blood Glucose.: 107 mg/dL (14 Jan 2022 23:25)  POCT Blood Glucose.: 95 mg/dL (14 Jan 2022 21:15)  POCT Blood Glucose.: 104 mg/dL (14 Jan 2022 18:04)  POCT Blood Glucose.: 121 mg/dL (14 Jan 2022 14:13)  POCT Blood Glucose.: 119 mg/dL (14 Jan 2022 10:58)  POCT Blood Glucose.: 109 mg/dL (14 Jan 2022 07:55)    I&O's Detail      Physical Exam:  GEN: resting in bed comfortably in NAD  NEURO: awake, alert  RESP: no increased WOB  EXTR: R 1st toe with gangrenous wound on distal aspect of toe, no malodor or purulence, no s/s of wet conversion  ______________________________________________  LABS:  CBC Full  -  ( 14 Jan 2022 04:17 )  WBC Count : 5.91 K/uL  RBC Count : 3.28 M/uL  Hemoglobin : 9.7 g/dL  Hematocrit : 31.1 %  Platelet Count - Automated : 182 K/uL  Mean Cell Volume : 94.8 fl  Mean Cell Hemoglobin : 29.6 pg  Mean Cell Hemoglobin Concentration : 31.2 gm/dL  Auto Neutrophil # : x  Auto Lymphocyte # : x  Auto Monocyte # : x  Auto Eosinophil # : x  Auto Basophil # : x  Auto Neutrophil % : x  Auto Lymphocyte % : x  Auto Monocyte % : x  Auto Eosinophil % : x  Auto Basophil % : x    01-14    139  |  96  |  30<H>  ----------------------------<  131<H>  3.7   |  29  |  5.59<H>    Ca    9.4      14 Jan 2022 04:17    TPro  8.4<H>  /  Alb  4.4  /  TBili  0.4  /  DBili  x   /  AST  11  /  ALT  12  /  AlkPhos  112  01-13    _____________________________________________  RADIOLOGY:     SURGERY  Pager: [phone]    STATUS POST: RLE angio via L CFA access, stenting of mid and distal SFA as well as popliteal and PT trunk, with mynx closure    POST OPERATIVE DAY #1    INTERVAL EVENTS/SUBJECTIVE: No acute events overnight, recovering appropriately from procedure. Patient denies pain. Denies CP/SOB.     ______________________________________________  OBJECTIVE:   T(C): 36.8 (01-15-22 @ 01:00), Max: 36.9 (01-15-22 @ 00:00)  HR: 80 (01-15-22 @ 01:00) (65 - 91)  BP: 122/- (01-15-22 @ 01:00) (113/63 - 167/77)  RR: 18 (01-15-22 @ 01:00) (15 - 19)  SpO2: 97% (01-15-22 @ 01:00) (95% - 99%)  Wt(kg): --    CAPILLARY BLOOD GLUCOSE      POCT Blood Glucose.: 107 mg/dL (14 Jan 2022 23:25)  POCT Blood Glucose.: 95 mg/dL (14 Jan 2022 21:15)  POCT Blood Glucose.: 104 mg/dL (14 Jan 2022 18:04)  POCT Blood Glucose.: 121 mg/dL (14 Jan 2022 14:13)  POCT Blood Glucose.: 119 mg/dL (14 Jan 2022 10:58)  POCT Blood Glucose.: 109 mg/dL (14 Jan 2022 07:55)    I&O's Detail      Physical Exam:  GEN: resting in bed comfortably in NAD  NEURO: awake, alert  RESP: no increased WOB  EXTR: R 1st toe with gangrenous wound on distal aspect of toe, no malodor or purulence, no s/s of wet conversion  Left groin dressing dry, mildly tender, no hematoma/PSA, no thrill appreciated.   Right DP and PT doppler+.     ______________________________________________                        10.9   7.72  )-----------( 207      ( 15 Arnold 2022 07:02 )             35.1       01-15    137  |  94<L>  |  42<H>  ----------------------------<  107<H>  5.0   |  24  |  7.63<H>    Ca    9.9      15 Arnold 2022 07:02  Phos  7.3     01-15  Mg     2.5     01-15    TPro  8.4<H>  /  Alb  4.4  /  TBili  0.4  /  DBili  x   /  AST  11  /  ALT  12  /  AlkPhos  112  01-13          PT/INR - ( 14 Jan 2022 04:17 )   PT: 13.9 sec;   INR: 1.17 ratio         PTT - ( 14 Jan 2022 04:17 )  PTT:34.5 sec    CARDIAC MARKERS ( 13 Jan 2022 18:45 )  x     / x     / 39 U/L / x     / 2.8 ng/mL      CAPILLARY BLOOD GLUCOSE  POCT Blood Glucose.: 103 mg/dL (15 Arnold 2022 08:40)        01-14    139  |  96  |  30<H>  ----------------------------<  131<H>  3.7   |  29  |  5.59<H>    Ca    9.4      14 Jan 2022 04:17    TPro  8.4<H>  /  Alb  4.4  /  TBili  0.4  /  DBili  x   /  AST  11  /  ALT  12  /  AlkPhos  112  01-13    _____________________________________________  RADIOLOGY:

## 2022-01-15 NOTE — PROGRESS NOTE ADULT - SUBJECTIVE AND OBJECTIVE BOX
DATE OF SERVICE: 01-15-22 @ 14:06    Subjective: Patient seen and examined. No new events except as noted.     SUBJECTIVE/ROS:  Pt seen and examined early this am       MEDICATIONS:  MEDICATIONS  (STANDING):  atorvastatin 40 milliGRAM(s) Oral at bedtime  carvedilol 6.25 milliGRAM(s) Oral every 12 hours  cilostazol 50 milliGRAM(s) Oral every 12 hours  clopidogrel Tablet 75 milliGRAM(s) Oral daily  dextrose 40% Gel 15 Gram(s) Oral once  dextrose 5%. 1000 milliLiter(s) (50 mL/Hr) IV Continuous <Continuous>  dextrose 5%. 1000 milliLiter(s) (100 mL/Hr) IV Continuous <Continuous>  dextrose 50% Injectable 25 Gram(s) IV Push once  dextrose 50% Injectable 12.5 Gram(s) IV Push once  dextrose 50% Injectable 25 Gram(s) IV Push once  glucagon  Injectable 1 milliGRAM(s) IntraMuscular once  heparin   Injectable 5000 Unit(s) SubCutaneous every 8 hours  insulin glargine Injectable (LANTUS) 10 Unit(s) SubCutaneous at bedtime  insulin lispro (ADMELOG) corrective regimen sliding scale   SubCutaneous three times a day before meals  lisinopril 2.5 milliGRAM(s) Oral daily  midodrine. 5 milliGRAM(s) Oral <User Schedule>  mupirocin 2% Ointment 1 Application(s) Topical two times a day  Nephro-bridger 1 Tablet(s) Oral daily  piperacillin/tazobactam IVPB.. 3.375 Gram(s) IV Intermittent every 12 hours      PHYSICAL EXAM:  T(C): 37 (01-15-22 @ 06:09), Max: 37 (01-15-22 @ 06:09)  HR: 88 (01-15-22 @ 06:09) (66 - 89)  BP: 132/80 (01-15-22 @ 06:09) (122/72 - 167/77)  RR: 18 (01-15-22 @ 06:09) (15 - 19)  SpO2: 99% (01-15-22 @ 06:09) (95% - 99%)  Wt(kg): --  I&O's Summary    Height (cm): 175.3 (01-14 @ 14:35)  Weight (kg): 90.7 (01-14 @ 14:35)  BMI (kg/m2): 29.5 (01-14 @ 14:35)  BSA (m2): 2.07 (01-14 @ 14:35)      JVP: Normal  Neck: supple  Lung: clear   CV: S1 S2 , Murmur:  Abd: soft  Ext: No edema  neuro: Awake / alert  Psych: flat affect  =    LABS/DATA:    CARDIAC MARKERS:  CARDIAC MARKERS ( 13 Jan 2022 18:45 )  x     / x     / 39 U/L / x     / 2.8 ng/mL                                10.9   7.72  )-----------( 207      ( 15 Arnold 2022 07:02 )             35.1     01-15    137  |  94<L>  |  42<H>  ----------------------------<  107<H>  5.0   |  24  |  7.63<H>    Ca    9.9      15 Arnold 2022 07:02  Phos  7.3     01-15  Mg     2.5     01-15    TPro  8.4<H>  /  Alb  4.4  /  TBili  0.4  /  DBili  x   /  AST  11  /  ALT  12  /  AlkPhos  112  01-13    proBNP:   Lipid Profile:   HgA1c:   TSH:     TELE:  EKG:

## 2022-01-16 ENCOUNTER — TRANSCRIPTION ENCOUNTER (OUTPATIENT)
Age: 52
End: 2022-01-16

## 2022-01-16 LAB
ANION GAP SERPL CALC-SCNC: 17 MMOL/L — SIGNIFICANT CHANGE UP (ref 5–17)
BUN SERPL-MCNC: 27 MG/DL — HIGH (ref 7–23)
CALCIUM SERPL-MCNC: 9.8 MG/DL — SIGNIFICANT CHANGE UP (ref 8.4–10.5)
CHLORIDE SERPL-SCNC: 95 MMOL/L — LOW (ref 96–108)
CO2 SERPL-SCNC: 27 MMOL/L — SIGNIFICANT CHANGE UP (ref 22–31)
CREAT SERPL-MCNC: 6.11 MG/DL — HIGH (ref 0.5–1.3)
GLUCOSE BLDC GLUCOMTR-MCNC: 122 MG/DL — HIGH (ref 70–99)
GLUCOSE BLDC GLUCOMTR-MCNC: 135 MG/DL — HIGH (ref 70–99)
GLUCOSE BLDC GLUCOMTR-MCNC: 145 MG/DL — HIGH (ref 70–99)
GLUCOSE BLDC GLUCOMTR-MCNC: 157 MG/DL — HIGH (ref 70–99)
GLUCOSE SERPL-MCNC: 106 MG/DL — HIGH (ref 70–99)
HAV IGM SER-ACNC: SIGNIFICANT CHANGE UP
HBV CORE IGM SER-ACNC: SIGNIFICANT CHANGE UP
HBV SURFACE AG SER-ACNC: SIGNIFICANT CHANGE UP
HCT VFR BLD CALC: 38.2 % — LOW (ref 39–50)
HCV AB S/CO SERPL IA: 0.25 S/CO — SIGNIFICANT CHANGE UP (ref 0–0.99)
HCV AB SERPL-IMP: SIGNIFICANT CHANGE UP
HGB BLD-MCNC: 11.7 G/DL — LOW (ref 13–17)
MAGNESIUM SERPL-MCNC: 2.3 MG/DL — SIGNIFICANT CHANGE UP (ref 1.6–2.6)
MCHC RBC-ENTMCNC: 29.1 PG — SIGNIFICANT CHANGE UP (ref 27–34)
MCHC RBC-ENTMCNC: 30.6 GM/DL — LOW (ref 32–36)
MCV RBC AUTO: 95 FL — SIGNIFICANT CHANGE UP (ref 80–100)
NRBC # BLD: 0 /100 WBCS — SIGNIFICANT CHANGE UP (ref 0–0)
PHOSPHATE SERPL-MCNC: 5.1 MG/DL — HIGH (ref 2.5–4.5)
PLATELET # BLD AUTO: 175 K/UL — SIGNIFICANT CHANGE UP (ref 150–400)
POTASSIUM SERPL-MCNC: 4.2 MMOL/L — SIGNIFICANT CHANGE UP (ref 3.5–5.3)
POTASSIUM SERPL-SCNC: 4.2 MMOL/L — SIGNIFICANT CHANGE UP (ref 3.5–5.3)
RBC # BLD: 4.02 M/UL — LOW (ref 4.2–5.8)
RBC # FLD: 18 % — HIGH (ref 10.3–14.5)
SARS-COV-2 RNA SPEC QL NAA+PROBE: DETECTED
SARS-COV-2 RNA SPEC QL NAA+PROBE: DETECTED
SODIUM SERPL-SCNC: 139 MMOL/L — SIGNIFICANT CHANGE UP (ref 135–145)
WBC # BLD: 4.62 K/UL — SIGNIFICANT CHANGE UP (ref 3.8–10.5)
WBC # FLD AUTO: 4.62 K/UL — SIGNIFICANT CHANGE UP (ref 3.8–10.5)

## 2022-01-16 PROCEDURE — 71045 X-RAY EXAM CHEST 1 VIEW: CPT | Mod: 26

## 2022-01-16 RX ADMIN — CLOPIDOGREL BISULFATE 75 MILLIGRAM(S): 75 TABLET, FILM COATED ORAL at 11:08

## 2022-01-16 RX ADMIN — LISINOPRIL 2.5 MILLIGRAM(S): 2.5 TABLET ORAL at 06:11

## 2022-01-16 RX ADMIN — MUPIROCIN 1 APPLICATION(S): 20 OINTMENT TOPICAL at 09:06

## 2022-01-16 RX ADMIN — Medication 1 TABLET(S): at 11:08

## 2022-01-16 RX ADMIN — Medication 1: at 13:09

## 2022-01-16 RX ADMIN — CILOSTAZOL 50 MILLIGRAM(S): 100 TABLET ORAL at 19:07

## 2022-01-16 RX ADMIN — CARVEDILOL PHOSPHATE 6.25 MILLIGRAM(S): 80 CAPSULE, EXTENDED RELEASE ORAL at 10:56

## 2022-01-16 RX ADMIN — HEPARIN SODIUM 5000 UNIT(S): 5000 INJECTION INTRAVENOUS; SUBCUTANEOUS at 13:09

## 2022-01-16 RX ADMIN — HEPARIN SODIUM 5000 UNIT(S): 5000 INJECTION INTRAVENOUS; SUBCUTANEOUS at 23:28

## 2022-01-16 RX ADMIN — CILOSTAZOL 50 MILLIGRAM(S): 100 TABLET ORAL at 10:56

## 2022-01-16 RX ADMIN — INSULIN GLARGINE 10 UNIT(S): 100 INJECTION, SOLUTION SUBCUTANEOUS at 23:28

## 2022-01-16 RX ADMIN — MUPIROCIN 1 APPLICATION(S): 20 OINTMENT TOPICAL at 23:29

## 2022-01-16 RX ADMIN — PIPERACILLIN AND TAZOBACTAM 25 GRAM(S): 4; .5 INJECTION, POWDER, LYOPHILIZED, FOR SOLUTION INTRAVENOUS at 10:56

## 2022-01-16 RX ADMIN — PIPERACILLIN AND TAZOBACTAM 25 GRAM(S): 4; .5 INJECTION, POWDER, LYOPHILIZED, FOR SOLUTION INTRAVENOUS at 23:32

## 2022-01-16 RX ADMIN — ATORVASTATIN CALCIUM 40 MILLIGRAM(S): 80 TABLET, FILM COATED ORAL at 23:28

## 2022-01-16 RX ADMIN — HEPARIN SODIUM 5000 UNIT(S): 5000 INJECTION INTRAVENOUS; SUBCUTANEOUS at 06:12

## 2022-01-16 NOTE — PROGRESS NOTE ADULT - ASSESSMENT
50yo M w/ right foot hallux wound to bone  - pt seen and evaluated  - afebrile, WBC 4.6  - Right foot hallux nailbed wound probing to bone, improved erythema to MPJ, dactylitis of hallux, no purulence, no malodor, no tracking, no fluctuance   - Right foot xray showing hallux distal phalanx OM, no gas   - Right foot wound culture taken  - s/p 1/14 RLE Angio , vasc recs appreciated  - Recommend nephro consult  - Pod plan for right foot partial 1st ray resection tentatively booked for Monday 1/17 at 3pm   - medical/ cardiac clearance documented and appreciated  - please document recent pacemaker interrogation prior to procedure  - please repeat COVID prior to procedure  - NPO after midnight   - discussed w/ attending  52yo M w/ right foot hallux wound to bone  - pt seen and evaluated  - afebrile, WBC 4.6  - Right foot hallux nailbed wound probing to bone, improved erythema to MPJ, dactylitis of hallux, no purulence, no malodor, no tracking, no fluctuance   - Right foot xray showing hallux distal phalanx OM, no gas   - Right foot wound culture taken  - s/p 1/14 RLE Angio , vasc recs appreciated  - Recommend nephro consult  - Pod plan for right foot partial 1st ray resection tentatively booked for Monday 1/17 at 3pm   - medical/ cardiac clearance documented and appreciated  - please document recent pacemaker interrogation prior to procedure  - please repeat COVID prior to procedure  - NPO after 6 am  - discussed w/ attending

## 2022-01-16 NOTE — PROGRESS NOTE ADULT - SUBJECTIVE AND OBJECTIVE BOX
DATE OF SERVICE: 01-16-22 @ 06:53    Subjective: Patient seen and examined. No new events except as noted.     SUBJECTIVE/ROS:  No chest pain, dyspnea, palpitation, or dizziness.       MEDICATIONS:  MEDICATIONS  (STANDING):  atorvastatin 40 milliGRAM(s) Oral at bedtime  carvedilol 6.25 milliGRAM(s) Oral every 12 hours  cilostazol 50 milliGRAM(s) Oral every 12 hours  clopidogrel Tablet 75 milliGRAM(s) Oral daily  dextrose 40% Gel 15 Gram(s) Oral once  dextrose 5%. 1000 milliLiter(s) (50 mL/Hr) IV Continuous <Continuous>  dextrose 5%. 1000 milliLiter(s) (100 mL/Hr) IV Continuous <Continuous>  dextrose 50% Injectable 25 Gram(s) IV Push once  dextrose 50% Injectable 12.5 Gram(s) IV Push once  dextrose 50% Injectable 25 Gram(s) IV Push once  glucagon  Injectable 1 milliGRAM(s) IntraMuscular once  heparin   Injectable 5000 Unit(s) SubCutaneous every 8 hours  insulin glargine Injectable (LANTUS) 10 Unit(s) SubCutaneous at bedtime  insulin lispro (ADMELOG) corrective regimen sliding scale   SubCutaneous three times a day before meals  lisinopril 2.5 milliGRAM(s) Oral daily  midodrine. 5 milliGRAM(s) Oral <User Schedule>  mupirocin 2% Ointment 1 Application(s) Topical two times a day  Nephro-bridger 1 Tablet(s) Oral daily  piperacillin/tazobactam IVPB.. 3.375 Gram(s) IV Intermittent every 12 hours      PHYSICAL EXAM:  T(C): 36.9 (01-16-22 @ 05:08), Max: 37.1 (01-15-22 @ 22:11)  HR: 84 (01-16-22 @ 05:08) (84 - 99)  BP: 112/67 (01-16-22 @ 05:08) (106/56 - 152/71)  RR: 18 (01-16-22 @ 05:08) (17 - 18)  SpO2: 97% (01-16-22 @ 05:08) (96% - 100%)  Wt(kg): --  I&O's Summary    15 Arnold 2022 07:01  -  16 Jan 2022 06:53  --------------------------------------------------------  IN: 360 mL / OUT: 2500 mL / NET: -2140 mL            JVP: Normal  Neck: supple  Lung: clear   CV: S1 S2 , Murmur:  Abd: soft  Ext: No edema  neuro: Awake / alert  Psych: flat affect      LABS/DATA:    CARDIAC MARKERS:  CARDIAC MARKERS ( 13 Jan 2022 18:45 )  x     / x     / 39 U/L / x     / 2.8 ng/mL                                10.9   7.72  )-----------( 207      ( 15 Arnold 2022 07:02 )             35.1     01-15    137  |  94<L>  |  42<H>  ----------------------------<  107<H>  5.0   |  24  |  7.63<H>    Ca    9.9      15 Arnold 2022 07:02  Phos  7.3     01-15  Mg     2.5     01-15      proBNP:   Lipid Profile:   HgA1c:   TSH:     TELE:  EKG:

## 2022-01-16 NOTE — PROGRESS NOTE ADULT - ASSESSMENT
51M with PMH of DM, ESRD TTS (s/p L AVF by Dr. Malone in 2017) presents with right foot hallux wound to bone, s/p RLE angio via L CFA access, stenting of mid and distal SFA as well as popliteal and PT trunk, with mynx closure.    Plan:  - Pain control as needed  - DVT ppx, started on pletal and plavix  - OR tomorrow ~3pm with Podiatry for R foot partial 1st ray resection   - F/u AM labs  - NPO tomorrow AM      Vascular Surgery  #[id number]    51M with PMH of DM, ESRD TTS (s/p L AVF by Dr. Malone in 2017) presents with right foot hallux wound to bone, s/p RLE angio via L CFA access, stenting of mid and distal SFA as well as popliteal and PT trunk, with mynx closure.    Plan:  - Pain control as needed  - DVT ppx, started on pletal and plavix  - OR tomorrow ~3pm with Podiatry for R foot partial 1st ray resection   - F/u AM labs  - NPO tomorrow AM  - Pre op Labs ordered + Covid [facility]      Vascular Surgery  #[id number]

## 2022-01-16 NOTE — PROGRESS NOTE ADULT - SUBJECTIVE AND OBJECTIVE BOX
Podiatry pager #: [phone] ([place])/ [phone] ([facility])    Patient is a 51y old  Male who presents with a chief complaint of      INTERVAL HPI/OVERNIGHT EVENTS:  Patient seen and evaluated at bedside.  Pt is resting comfortable in NAD. Denies N/V/F/C.     Allergies    No Known Allergies    Intolerances        Vital Signs Last 24 Hrs  T(C): 36.9 (16 Jan 2022 05:08), Max: 37.1 (15 Arnold 2022 22:11)  T(F): 98.4 (16 Jan 2022 05:08), Max: 98.7 (15 Arnold 2022 22:11)  HR: 84 (16 Jan 2022 05:08) (84 - 99)  BP: 112/67 (16 Jan 2022 05:08) (106/56 - 152/71)  BP(mean): --  RR: 18 (16 Jan 2022 05:08) (17 - 18)  SpO2: 97% (16 Jan 2022 05:08) (96% - 100%)    LABS:                        11.7   4.62  )-----------( 175      ( 16 Jan 2022 07:13 )             38.2     01-16    139  |  95<L>  |  27<H>  ----------------------------<  106<H>  4.2   |  27  |  6.11<H>    Ca    9.8      16 Jan 2022 07:17  Phos  5.1     01-16  Mg     2.3     01-16          CAPILLARY BLOOD GLUCOSE      POCT Blood Glucose.: 122 mg/dL (16 Jan 2022 08:36)  POCT Blood Glucose.: 136 mg/dL (15 Arnold 2022 22:07)  POCT Blood Glucose.: 108 mg/dL (15 Arnold 2022 17:25)  POCT Blood Glucose.: 138 mg/dL (15 Arnold 2022 14:05)  POCT Blood Glucose.: 177 mg/dL (15 Arnold 2022 12:36)      Lower Extremity Physical Exam:  Vascular: DP 0/4, PT 1/4 B/L, CFT <3 seconds B/L, Temperature gradient warm to cool B/L  Neuro: Epicritic sensation absent to the level of digits B/L  Musculoskeletal/Ortho: unremarkable   Skin: Right foot hallux nailbed wound probing to bone, erythema resolved, dactylitis of hallux, no purulence, no malodor, no tracking, no fluctuance     RADIOLOGY & ADDITIONAL TESTS:

## 2022-01-16 NOTE — PROGRESS NOTE ADULT - SUBJECTIVE AND OBJECTIVE BOX
Patient is a 51y old  Male who presents with a chief complaint of     SUBJECTIVE / OVERNIGHT EVENTS: ptn is coughing and has the sniffles since yesterday, covid PCR is positive.     MEDICATIONS  (STANDING):  atorvastatin 40 milliGRAM(s) Oral at bedtime  carvedilol 6.25 milliGRAM(s) Oral every 12 hours  cilostazol 50 milliGRAM(s) Oral every 12 hours  clopidogrel Tablet 75 milliGRAM(s) Oral daily  dextrose 40% Gel 15 Gram(s) Oral once  dextrose 5%. 1000 milliLiter(s) (50 mL/Hr) IV Continuous <Continuous>  dextrose 5%. 1000 milliLiter(s) (100 mL/Hr) IV Continuous <Continuous>  dextrose 50% Injectable 25 Gram(s) IV Push once  dextrose 50% Injectable 12.5 Gram(s) IV Push once  dextrose 50% Injectable 25 Gram(s) IV Push once  glucagon  Injectable 1 milliGRAM(s) IntraMuscular once  heparin   Injectable 5000 Unit(s) SubCutaneous every 8 hours  insulin glargine Injectable (LANTUS) 10 Unit(s) SubCutaneous at bedtime  insulin lispro (ADMELOG) corrective regimen sliding scale   SubCutaneous three times a day before meals  lisinopril 2.5 milliGRAM(s) Oral daily  midodrine. 5 milliGRAM(s) Oral <User Schedule>  mupirocin 2% Ointment 1 Application(s) Topical two times a day  Nephro-bridger 1 Tablet(s) Oral daily  piperacillin/tazobactam IVPB.. 3.375 Gram(s) IV Intermittent every 12 hours    MEDICATIONS  (PRN):  acetaminophen     Tablet .. 650 milliGRAM(s) Oral every 6 hours PRN Mild Pain (1 - 3)  oxyCODONE    IR 5 milliGRAM(s) Oral every 6 hours PRN Moderate Pain (4 - 6)      Vital Signs Last 24 Hrs  T(F): 98.4 (01-16-22 @ 05:08), Max: 98.7 (01-15-22 @ 22:11)  HR: 84 (01-16-22 @ 05:08) (84 - 99)  BP: 112/67 (01-16-22 @ 05:08) (106/56 - 122/64)  RR: 18 (01-16-22 @ 05:08) (17 - 18)  SpO2: 97% (01-16-22 @ 05:08) (97% - 100%)  Telemetry:   CAPILLARY BLOOD GLUCOSE      POCT Blood Glucose.: 145 mg/dL (16 Jan 2022 17:35)  POCT Blood Glucose.: 157 mg/dL (16 Jan 2022 12:31)  POCT Blood Glucose.: 122 mg/dL (16 Jan 2022 08:36)  POCT Blood Glucose.: 136 mg/dL (15 Arnold 2022 22:07)    I&O's Summary    15 Arnold 2022 07:01  -  16 Jan 2022 07:00  --------------------------------------------------------  IN: 360 mL / OUT: 2500 mL / NET: -2140 mL    16 Jan 2022 07:01  -  16 Jan 2022 17:42  --------------------------------------------------------  IN: 480 mL / OUT: 0 mL / NET: 480 mL        PHYSICAL EXAM:  GENERAL: NAD, well-developed  HEAD:  Atraumatic, Normocephalic  EYES: EOMI, PERRLA, conjunctiva and sclera clear  NECK: Supple, No JVD  CHEST/LUNG: Clear to auscultation bilaterally; No wheeze  HEART: Regular rate and rhythm; No murmurs, rubs, or gallops  ABDOMEN: Soft, Nontender, Nondistended; Bowel sounds present  EXTREMITIES:  2+ Peripheral Pulses, No clubbing, cyanosis, or edema  PSYCH: AAOx3  NEUROLOGY: non-focal  SKIN: No rashes or lesions    LABS:                        11.7   4.62  )-----------( 175      ( 16 Jan 2022 07:13 )             38.2     01-16    139  |  95<L>  |  27<H>  ----------------------------<  106<H>  4.2   |  27  |  6.11<H>    Ca    9.8      16 Jan 2022 07:17  Phos  5.1     01-16  Mg     2.3     01-16                RADIOLOGY & ADDITIONAL TESTS:    Imaging Personally Reviewed:    Consultant(s) Notes Reviewed:      Care Discussed with Consultants/Other Providers:

## 2022-01-16 NOTE — CHART NOTE - NSCHARTNOTEFT_GEN_A_CORE
Discussion held with EP w.r.t ICD and indication for interrogation pre-op.  As per EP, no need for interrogation as planned procedure is below naval.     Tatiana James  Vascular Surgery [phone]

## 2022-01-16 NOTE — PROGRESS NOTE ADULT - ASSESSMENT
50yo M w/ h/o DM, CAD s/p CABG, h/o chronic systolic CHF but latest outptn EF improved to 50% here its 40% on this admission,  ESRD on HD, DIabetic neuropathy, doming in w diabetic foot ulcer with OM of right foot hallux wound to bone  - pt seen and evaluated by podiatry and vascular  -  ESR elevated   - Right foot hallux nailbed wound probing to bone, erythema to MPJ, dactylitis of hallux, no purulence, no malodor, no tracking, no fluctuance   - Right foot wound culture taken by podiatry. but not resulted in sunrise, blood Cx NTD. ID following, on Zosyn, s/p Vanco  - Pt seen by vascular as well:  Dr. Malone at bedside, s/p RLE angio via L CFA access, stenting of mid and distal SFA as well as popliteal and PT trunk, with mynx closure. placed on Pletal, Plavix, off ASA  - plan for partial foot amp by podiatry on 1/17, but due to POS COVID PCR may need to be delayed. ID made aware, will INQUIRE RE GETTING MONOCLONAL Abx treatment  - HD as per renal  - CAD/ systolic CHF stable, card following, on BB, ACE-I, on pletal and Plavix, taken off ASA  - TTE reviewed EF 40%  - Pod plan for right foot partial 1st ray resection vs. partial hallux amputation for 1/17, may need to be delayed 2/2 pos covid PCR on 1/16, family members at home also positive. on admission 1/13 ptn was negative  - DM, on Insulin on a sliding scale,numbers are stable  - dvt ppx w HSC  - cont outptn meds

## 2022-01-16 NOTE — PROGRESS NOTE ADULT - SUBJECTIVE AND OBJECTIVE BOX
VASCULAR SURGERY  Pager: [phone]    STATUS POST: RLE angio via L CFA access, stenting of mid and distal SFA as well as popliteal and PT trunk, with mynx closure (1/14)    POST OPERATIVE DAY #2    INTERVAL EVENTS/SUBJECTIVE: No acute events overnight, recovering appropriately from procedure. Patient denies pain. Denies CP/SOB. Tolerating his diet.     ______________________________________________  OBJECTIVE:   Vital Signs Last 24 Hrs  T(C): 36.9 (16 Jan 2022 05:08), Max: 37.1 (15 Arnold 2022 22:11)  T(F): 98.4 (16 Jan 2022 05:08), Max: 98.7 (15 Arnold 2022 22:11)  HR: 84 (16 Jan 2022 05:08) (84 - 99)  BP: 112/67 (16 Jan 2022 05:08) (106/56 - 152/71)  BP(mean): --  RR: 18 (16 Jan 2022 05:08) (17 - 18)  SpO2: 97% (16 Jan 2022 05:08) (96% - 100%)  I&O's Detail    15 Arnold 2022 07:01  -  16 Jan 2022 07:00  --------------------------------------------------------  IN:    Oral Fluid: 360 mL  Total IN: 360 mL    OUT:    Other (mL): 2500 mL  Total OUT: 2500 mL    Total NET: -2140 mL        Physical Exam:  GEN: resting in bed comfortably in NAD  NEURO: awake, alert  RESP: no increased WOB  EXTR: R 1st toe with gangrenous wound on distal aspect of toe, no malodor or purulence, no s/s of wet conversion  Left groin dressing dry, mildly tender, no hematoma/PSA, no thrill appreciated.   Right DP and PT doppler+.   ______________________________________________                             11.7   4.62  )-----------( 175      ( 16 Jan 2022 07:13 )             38.2     01-16    139  |  95<L>  |  27<H>  ----------------------------<  106<H>  4.2   |  27  |  6.11<H>    Ca    9.8      16 Jan 2022 07:17  Phos  5.1     01-16  Mg     2.3     01-16              CAPILLARY BLOOD GLUCOSE      POCT Blood Glucose.: 122 mg/dL (16 Jan 2022 08:36)

## 2022-01-17 ENCOUNTER — TRANSCRIPTION ENCOUNTER (OUTPATIENT)
Age: 52
End: 2022-01-17

## 2022-01-17 ENCOUNTER — RESULT REVIEW (OUTPATIENT)
Age: 52
End: 2022-01-17

## 2022-01-17 LAB
ANION GAP SERPL CALC-SCNC: 18 MMOL/L — HIGH (ref 5–17)
ANION GAP SERPL CALC-SCNC: 18 MMOL/L — HIGH (ref 5–17)
APTT BLD: 36 SEC — HIGH (ref 27.5–35.5)
BLD GP AB SCN SERPL QL: NEGATIVE — SIGNIFICANT CHANGE UP
BUN SERPL-MCNC: 43 MG/DL — HIGH (ref 7–23)
BUN SERPL-MCNC: 48 MG/DL — HIGH (ref 7–23)
CALCIUM SERPL-MCNC: 9.3 MG/DL — SIGNIFICANT CHANGE UP (ref 8.4–10.5)
CALCIUM SERPL-MCNC: 9.4 MG/DL — SIGNIFICANT CHANGE UP (ref 8.4–10.5)
CHLORIDE SERPL-SCNC: 94 MMOL/L — LOW (ref 96–108)
CHLORIDE SERPL-SCNC: 95 MMOL/L — LOW (ref 96–108)
CK MB CFR SERPL CALC: 1.4 NG/ML — SIGNIFICANT CHANGE UP (ref 0–6.7)
CK SERPL-CCNC: 31 U/L — SIGNIFICANT CHANGE UP (ref 30–200)
CO2 SERPL-SCNC: 22 MMOL/L — SIGNIFICANT CHANGE UP (ref 22–31)
CO2 SERPL-SCNC: 23 MMOL/L — SIGNIFICANT CHANGE UP (ref 22–31)
CREAT SERPL-MCNC: 8.36 MG/DL — HIGH (ref 0.5–1.3)
CREAT SERPL-MCNC: 8.87 MG/DL — HIGH (ref 0.5–1.3)
GLUCOSE BLDC GLUCOMTR-MCNC: 128 MG/DL — HIGH (ref 70–99)
GLUCOSE BLDC GLUCOMTR-MCNC: 135 MG/DL — HIGH (ref 70–99)
GLUCOSE BLDC GLUCOMTR-MCNC: 97 MG/DL — SIGNIFICANT CHANGE UP (ref 70–99)
GLUCOSE SERPL-MCNC: 104 MG/DL — HIGH (ref 70–99)
GLUCOSE SERPL-MCNC: 137 MG/DL — HIGH (ref 70–99)
GRAM STN FLD: SIGNIFICANT CHANGE UP
GRAM STN FLD: SIGNIFICANT CHANGE UP
HCT VFR BLD CALC: 31.1 % — LOW (ref 39–50)
HCT VFR BLD CALC: 32.4 % — LOW (ref 39–50)
HGB BLD-MCNC: 10 G/DL — LOW (ref 13–17)
HGB BLD-MCNC: 9.8 G/DL — LOW (ref 13–17)
INR BLD: 1.15 RATIO — SIGNIFICANT CHANGE UP (ref 0.88–1.16)
MAGNESIUM SERPL-MCNC: 2.3 MG/DL — SIGNIFICANT CHANGE UP (ref 1.6–2.6)
MAGNESIUM SERPL-MCNC: 2.4 MG/DL — SIGNIFICANT CHANGE UP (ref 1.6–2.6)
MCHC RBC-ENTMCNC: 29.4 PG — SIGNIFICANT CHANGE UP (ref 27–34)
MCHC RBC-ENTMCNC: 30 PG — SIGNIFICANT CHANGE UP (ref 27–34)
MCHC RBC-ENTMCNC: 30.9 GM/DL — LOW (ref 32–36)
MCHC RBC-ENTMCNC: 31.5 GM/DL — LOW (ref 32–36)
MCV RBC AUTO: 95.1 FL — SIGNIFICANT CHANGE UP (ref 80–100)
MCV RBC AUTO: 95.3 FL — SIGNIFICANT CHANGE UP (ref 80–100)
NRBC # BLD: 0 /100 WBCS — SIGNIFICANT CHANGE UP (ref 0–0)
NRBC # BLD: 0 /100 WBCS — SIGNIFICANT CHANGE UP (ref 0–0)
PHOSPHATE SERPL-MCNC: 6.1 MG/DL — HIGH (ref 2.5–4.5)
PHOSPHATE SERPL-MCNC: 7 MG/DL — HIGH (ref 2.5–4.5)
PLATELET # BLD AUTO: 151 K/UL — SIGNIFICANT CHANGE UP (ref 150–400)
PLATELET # BLD AUTO: 159 K/UL — SIGNIFICANT CHANGE UP (ref 150–400)
POTASSIUM SERPL-MCNC: 4.1 MMOL/L — SIGNIFICANT CHANGE UP (ref 3.5–5.3)
POTASSIUM SERPL-MCNC: 4.2 MMOL/L — SIGNIFICANT CHANGE UP (ref 3.5–5.3)
POTASSIUM SERPL-SCNC: 4.1 MMOL/L — SIGNIFICANT CHANGE UP (ref 3.5–5.3)
POTASSIUM SERPL-SCNC: 4.2 MMOL/L — SIGNIFICANT CHANGE UP (ref 3.5–5.3)
PROTHROM AB SERPL-ACNC: 13.7 SEC — HIGH (ref 10.6–13.6)
RBC # BLD: 3.27 M/UL — LOW (ref 4.2–5.8)
RBC # BLD: 3.4 M/UL — LOW (ref 4.2–5.8)
RBC # FLD: 17.9 % — HIGH (ref 10.3–14.5)
RBC # FLD: 18.1 % — HIGH (ref 10.3–14.5)
RH IG SCN BLD-IMP: POSITIVE — SIGNIFICANT CHANGE UP
SODIUM SERPL-SCNC: 135 MMOL/L — SIGNIFICANT CHANGE UP (ref 135–145)
SODIUM SERPL-SCNC: 135 MMOL/L — SIGNIFICANT CHANGE UP (ref 135–145)
SPECIMEN SOURCE: SIGNIFICANT CHANGE UP
SPECIMEN SOURCE: SIGNIFICANT CHANGE UP
TROPONIN T, HIGH SENSITIVITY RESULT: 146 NG/L — HIGH (ref 0–51)
TROPONIN T, HIGH SENSITIVITY RESULT: 148 NG/L — HIGH (ref 0–51)
WBC # BLD: 6.11 K/UL — SIGNIFICANT CHANGE UP (ref 3.8–10.5)
WBC # BLD: 7.04 K/UL — SIGNIFICANT CHANGE UP (ref 3.8–10.5)
WBC # FLD AUTO: 6.11 K/UL — SIGNIFICANT CHANGE UP (ref 3.8–10.5)
WBC # FLD AUTO: 7.04 K/UL — SIGNIFICANT CHANGE UP (ref 3.8–10.5)

## 2022-01-17 PROCEDURE — 88304 TISSUE EXAM BY PATHOLOGIST: CPT | Mod: 26

## 2022-01-17 PROCEDURE — 88311 DECALCIFY TISSUE: CPT | Mod: 26

## 2022-01-17 PROCEDURE — 73630 X-RAY EXAM OF FOOT: CPT | Mod: 26,RT

## 2022-01-17 RX ORDER — ATORVASTATIN CALCIUM 80 MG/1
40 TABLET, FILM COATED ORAL AT BEDTIME
Refills: 0 | Status: DISCONTINUED | OUTPATIENT
Start: 2022-01-17 | End: 2022-01-19

## 2022-01-17 RX ORDER — LISINOPRIL 2.5 MG/1
2.5 TABLET ORAL DAILY
Refills: 0 | Status: DISCONTINUED | OUTPATIENT
Start: 2022-01-17 | End: 2022-01-19

## 2022-01-17 RX ORDER — CLOPIDOGREL BISULFATE 75 MG/1
75 TABLET, FILM COATED ORAL DAILY
Refills: 0 | Status: DISCONTINUED | OUTPATIENT
Start: 2022-01-17 | End: 2022-01-19

## 2022-01-17 RX ORDER — DEXTROSE 50 % IN WATER 50 %
12.5 SYRINGE (ML) INTRAVENOUS ONCE
Refills: 0 | Status: DISCONTINUED | OUTPATIENT
Start: 2022-01-17 | End: 2022-01-19

## 2022-01-17 RX ORDER — MIDODRINE HYDROCHLORIDE 2.5 MG/1
5 TABLET ORAL
Refills: 0 | Status: DISCONTINUED | OUTPATIENT
Start: 2022-01-17 | End: 2022-01-19

## 2022-01-17 RX ORDER — INSULIN LISPRO 100/ML
VIAL (ML) SUBCUTANEOUS
Refills: 0 | Status: DISCONTINUED | OUTPATIENT
Start: 2022-01-17 | End: 2022-01-19

## 2022-01-17 RX ORDER — CILOSTAZOL 100 MG/1
50 TABLET ORAL EVERY 12 HOURS
Refills: 0 | Status: DISCONTINUED | OUTPATIENT
Start: 2022-01-17 | End: 2022-01-19

## 2022-01-17 RX ORDER — SODIUM CHLORIDE 9 MG/ML
1000 INJECTION, SOLUTION INTRAVENOUS
Refills: 0 | Status: DISCONTINUED | OUTPATIENT
Start: 2022-01-17 | End: 2022-01-19

## 2022-01-17 RX ORDER — INSULIN LISPRO 100/ML
VIAL (ML) SUBCUTANEOUS AT BEDTIME
Refills: 0 | Status: DISCONTINUED | OUTPATIENT
Start: 2022-01-17 | End: 2022-01-19

## 2022-01-17 RX ORDER — INSULIN GLARGINE 100 [IU]/ML
10 INJECTION, SOLUTION SUBCUTANEOUS AT BEDTIME
Refills: 0 | Status: DISCONTINUED | OUTPATIENT
Start: 2022-01-17 | End: 2022-01-19

## 2022-01-17 RX ORDER — DEXTROSE 50 % IN WATER 50 %
25 SYRINGE (ML) INTRAVENOUS ONCE
Refills: 0 | Status: DISCONTINUED | OUTPATIENT
Start: 2022-01-17 | End: 2022-01-19

## 2022-01-17 RX ORDER — HEPARIN SODIUM 5000 [USP'U]/ML
5000 INJECTION INTRAVENOUS; SUBCUTANEOUS EVERY 8 HOURS
Refills: 0 | Status: DISCONTINUED | OUTPATIENT
Start: 2022-01-17 | End: 2022-01-19

## 2022-01-17 RX ORDER — PIPERACILLIN AND TAZOBACTAM 4; .5 G/20ML; G/20ML
3.38 INJECTION, POWDER, LYOPHILIZED, FOR SOLUTION INTRAVENOUS EVERY 12 HOURS
Refills: 0 | Status: DISCONTINUED | OUTPATIENT
Start: 2022-01-17 | End: 2022-01-19

## 2022-01-17 RX ORDER — GLUCAGON INJECTION, SOLUTION 0.5 MG/.1ML
1 INJECTION, SOLUTION SUBCUTANEOUS ONCE
Refills: 0 | Status: DISCONTINUED | OUTPATIENT
Start: 2022-01-17 | End: 2022-01-19

## 2022-01-17 RX ORDER — CARVEDILOL PHOSPHATE 80 MG/1
6.25 CAPSULE, EXTENDED RELEASE ORAL EVERY 12 HOURS
Refills: 0 | Status: DISCONTINUED | OUTPATIENT
Start: 2022-01-17 | End: 2022-01-19

## 2022-01-17 RX ORDER — HYDROMORPHONE HYDROCHLORIDE 2 MG/ML
2 INJECTION INTRAMUSCULAR; INTRAVENOUS; SUBCUTANEOUS EVERY 4 HOURS
Refills: 0 | Status: DISCONTINUED | OUTPATIENT
Start: 2022-01-17 | End: 2022-01-19

## 2022-01-17 RX ORDER — OXYCODONE AND ACETAMINOPHEN 5; 325 MG/1; MG/1
1 TABLET ORAL EVERY 4 HOURS
Refills: 0 | Status: DISCONTINUED | OUTPATIENT
Start: 2022-01-17 | End: 2022-01-19

## 2022-01-17 RX ORDER — DEXTROSE 50 % IN WATER 50 %
15 SYRINGE (ML) INTRAVENOUS ONCE
Refills: 0 | Status: DISCONTINUED | OUTPATIENT
Start: 2022-01-17 | End: 2022-01-19

## 2022-01-17 RX ORDER — SODIUM CHLORIDE 9 MG/ML
500 INJECTION INTRAMUSCULAR; INTRAVENOUS; SUBCUTANEOUS ONCE
Refills: 0 | Status: COMPLETED | OUTPATIENT
Start: 2022-01-17 | End: 2022-01-17

## 2022-01-17 RX ADMIN — HEPARIN SODIUM 5000 UNIT(S): 5000 INJECTION INTRAVENOUS; SUBCUTANEOUS at 22:45

## 2022-01-17 RX ADMIN — MUPIROCIN 1 APPLICATION(S): 20 OINTMENT TOPICAL at 08:22

## 2022-01-17 RX ADMIN — CARVEDILOL PHOSPHATE 6.25 MILLIGRAM(S): 80 CAPSULE, EXTENDED RELEASE ORAL at 10:38

## 2022-01-17 RX ADMIN — ATORVASTATIN CALCIUM 40 MILLIGRAM(S): 80 TABLET, FILM COATED ORAL at 22:45

## 2022-01-17 RX ADMIN — CLOPIDOGREL BISULFATE 75 MILLIGRAM(S): 75 TABLET, FILM COATED ORAL at 10:38

## 2022-01-17 RX ADMIN — HEPARIN SODIUM 5000 UNIT(S): 5000 INJECTION INTRAVENOUS; SUBCUTANEOUS at 15:05

## 2022-01-17 RX ADMIN — CILOSTAZOL 50 MILLIGRAM(S): 100 TABLET ORAL at 05:28

## 2022-01-17 RX ADMIN — PIPERACILLIN AND TAZOBACTAM 25 GRAM(S): 4; .5 INJECTION, POWDER, LYOPHILIZED, FOR SOLUTION INTRAVENOUS at 10:38

## 2022-01-17 RX ADMIN — Medication 1 TABLET(S): at 10:38

## 2022-01-17 RX ADMIN — LISINOPRIL 2.5 MILLIGRAM(S): 2.5 TABLET ORAL at 05:28

## 2022-01-17 RX ADMIN — SODIUM CHLORIDE 1000 MILLILITER(S): 9 INJECTION INTRAMUSCULAR; INTRAVENOUS; SUBCUTANEOUS at 02:44

## 2022-01-17 RX ADMIN — HEPARIN SODIUM 5000 UNIT(S): 5000 INJECTION INTRAVENOUS; SUBCUTANEOUS at 05:28

## 2022-01-17 RX ADMIN — INSULIN GLARGINE 10 UNIT(S): 100 INJECTION, SOLUTION SUBCUTANEOUS at 22:44

## 2022-01-17 NOTE — BRIEF OPERATIVE NOTE - NSICDXBRIEFPOSTOP_GEN_ALL_CORE_FT
POST-OP DIAGNOSIS:  PAD (peripheral artery disease) 14-Jan-2022 18:09:48  Hasmukh Santos  
POST-OP DIAGNOSIS:  Foot osteomyelitis, right 17-Jan-2022 18:13:21  Yvonne Peres

## 2022-01-17 NOTE — PROGRESS NOTE ADULT - ASSESSMENT
Plan:   To OR today at 3pm with Dr. Myers for R foot partial 1st ray resection.   CXR on sunrise.  EKG on sunrise.  Medical/Cardiac clearance since 1/15 and documented in chart.  No interrogation of pacemaker needed for procedure per EP  Consent signed and in chart.  Procedure was explained to patient in detail. All alternatives, risks and complications were discussed. All questions answered.

## 2022-01-17 NOTE — PROGRESS NOTE ADULT - SUBJECTIVE AND OBJECTIVE BOX
Patient is a 51y old  Male who presents with a chief complaint of LLE angiogram (17 Jan 2022 02:59)      INTERVAL HPI/OVERNIGHT EVENTS:   Pt is scheduled for R foot partial 1st ray resection with Dr. Myers at 3pm. Patient is aware of procedure and is NPO since midnight.    MEDICATIONS  (STANDING):  atorvastatin 40 milliGRAM(s) Oral at bedtime  carvedilol 6.25 milliGRAM(s) Oral every 12 hours  cilostazol 50 milliGRAM(s) Oral every 12 hours  clopidogrel Tablet 75 milliGRAM(s) Oral daily  dextrose 40% Gel 15 Gram(s) Oral once  dextrose 5%. 1000 milliLiter(s) (50 mL/Hr) IV Continuous <Continuous>  dextrose 5%. 1000 milliLiter(s) (100 mL/Hr) IV Continuous <Continuous>  dextrose 50% Injectable 25 Gram(s) IV Push once  dextrose 50% Injectable 12.5 Gram(s) IV Push once  dextrose 50% Injectable 25 Gram(s) IV Push once  glucagon  Injectable 1 milliGRAM(s) IntraMuscular once  heparin   Injectable 5000 Unit(s) SubCutaneous every 8 hours  insulin glargine Injectable (LANTUS) 10 Unit(s) SubCutaneous at bedtime  insulin lispro (ADMELOG) corrective regimen sliding scale   SubCutaneous three times a day before meals  lisinopril 2.5 milliGRAM(s) Oral daily  midodrine. 5 milliGRAM(s) Oral <User Schedule>  mupirocin 2% Ointment 1 Application(s) Topical two times a day  Nephro-bridger 1 Tablet(s) Oral daily  piperacillin/tazobactam IVPB.. 3.375 Gram(s) IV Intermittent every 12 hours    MEDICATIONS  (PRN):  acetaminophen     Tablet .. 650 milliGRAM(s) Oral every 6 hours PRN Mild Pain (1 - 3)  oxyCODONE    IR 5 milliGRAM(s) Oral every 6 hours PRN Moderate Pain (4 - 6)      Allergies    No Known Allergies    Intolerances        Vital Signs Last 24 Hrs  T(C): 36.9 (17 Jan 2022 05:45), Max: 37.1 (16 Jan 2022 21:22)  T(F): 98.5 (17 Jan 2022 05:45), Max: 98.7 (16 Jan 2022 21:22)  HR: 100 (17 Jan 2022 05:45) (85 - 110)  BP: 100/62 (17 Jan 2022 05:45) (86/52 - 117/75)  BP(mean): --  RR: 18 (17 Jan 2022 05:45) (18 - 18)  SpO2: 98% (17 Jan 2022 05:45) (95% - 98%)    LABS:                        9.8    6.11  )-----------( 151      ( 17 Jan 2022 06:53 )             31.1     01-17    135  |  95<L>  |  48<H>  ----------------------------<  104<H>  4.1   |  22  |  8.87<H>    Ca    9.3      17 Jan 2022 06:53  Phos  7.0     01-17  Mg     2.4     01-17      PT/INR - ( 17 Jan 2022 06:53 )   PT: 13.7 sec;   INR: 1.15 ratio         PTT - ( 17 Jan 2022 06:53 )  PTT:36.0 sec    CAPILLARY BLOOD GLUCOSE      POCT Blood Glucose.: 135 mg/dL (16 Jan 2022 21:49)  POCT Blood Glucose.: 145 mg/dL (16 Jan 2022 17:35)  POCT Blood Glucose.: 157 mg/dL (16 Jan 2022 12:31)      RADIOLOGY & ADDITIONAL TESTS:

## 2022-01-17 NOTE — DISCHARGE NOTE PROVIDER - CARE PROVIDERS DIRECT ADDRESSES
[email or web address]

## 2022-01-17 NOTE — PROGRESS NOTE ADULT - SUBJECTIVE AND OBJECTIVE BOX
ACS DAILY PROGRESS NOTE:       SUBJECTIVE/ROS: Hypotensive and tachycardic overnight. asymptomatic. denies dizziness, SOB, CP or palpitations         MEDICATIONS  (STANDING):  atorvastatin 40 milliGRAM(s) Oral at bedtime  carvedilol 6.25 milliGRAM(s) Oral every 12 hours  cilostazol 50 milliGRAM(s) Oral every 12 hours  clopidogrel Tablet 75 milliGRAM(s) Oral daily  dextrose 40% Gel 15 Gram(s) Oral once  dextrose 5%. 1000 milliLiter(s) (50 mL/Hr) IV Continuous <Continuous>  dextrose 5%. 1000 milliLiter(s) (100 mL/Hr) IV Continuous <Continuous>  dextrose 50% Injectable 25 Gram(s) IV Push once  dextrose 50% Injectable 12.5 Gram(s) IV Push once  dextrose 50% Injectable 25 Gram(s) IV Push once  glucagon  Injectable 1 milliGRAM(s) IntraMuscular once  heparin   Injectable 5000 Unit(s) SubCutaneous every 8 hours  insulin glargine Injectable (LANTUS) 10 Unit(s) SubCutaneous at bedtime  insulin lispro (ADMELOG) corrective regimen sliding scale   SubCutaneous three times a day before meals  lisinopril 2.5 milliGRAM(s) Oral daily  midodrine. 5 milliGRAM(s) Oral <User Schedule>  mupirocin 2% Ointment 1 Application(s) Topical two times a day  Nephro-bridger 1 Tablet(s) Oral daily  piperacillin/tazobactam IVPB.. 3.375 Gram(s) IV Intermittent every 12 hours    MEDICATIONS  (PRN):  acetaminophen     Tablet .. 650 milliGRAM(s) Oral every 6 hours PRN Mild Pain (1 - 3)  oxyCODONE    IR 5 milliGRAM(s) Oral every 6 hours PRN Moderate Pain (4 - 6)      OBJECTIVE:    Vital Signs Last 24 Hrs  T(C): 37 (17 Jan 2022 02:03), Max: 37.1 (16 Jan 2022 21:22)  T(F): 98.6 (17 Jan 2022 02:03), Max: 98.7 (16 Jan 2022 21:22)  HR: 100 (17 Jan 2022 02:03) (84 - 110)  BP: 86/52 (17 Jan 2022 02:03) (86/52 - 112/67)  BP(mean): --  RR: 18 (17 Jan 2022 02:03) (18 - 18)  SpO2: 97% (17 Jan 2022 02:03) (95% - 97%)    Physical Exam:  GEN: resting in bed comfortably in NAD  NEURO: awake, alert  RESP: no increased WOB  EXTR: R 1st toe with gangrenous wound on distal aspect of toe, no malodor or purulence, no s/s of wet conversion  Left groin dressing dry, mildly tender, no hematoma/PSA, no thrill appreciated.   Right DP and PT doppler+.       I&O's Detail    15 Arnold 2022 07:01  -  16 Jan 2022 07:00  --------------------------------------------------------  IN:    Oral Fluid: 360 mL  Total IN: 360 mL    OUT:    Other (mL): 2500 mL  Total OUT: 2500 mL    Total NET: -2140 mL      16 Jan 2022 07:01  -  17 Jan 2022 03:00  --------------------------------------------------------  IN:    Oral Fluid: 480 mL  Total IN: 480 mL    OUT:    Voided (mL): 0 mL  Total OUT: 0 mL    Total NET: 480 mL          Daily     Daily     LABS:                        10.0   7.04  )-----------( 159      ( 17 Jan 2022 00:06 )             32.4     01-17    135  |  94<L>  |  43<H>  ----------------------------<  137<H>  4.2   |  23  |  8.36<H>    Ca    9.4      17 Jan 2022 00:06  Phos  6.1     01-17  Mg     2.3     01-17                         VASCULAR PROGRESS NOTE:     SUBJECTIVE/ROS: Hypotensive and tachycardic overnight. Asymptomatic. Denies dizziness, SOB, CP or palpitations. Potentially from HD. Responded 500cc bolus.       MEDICATIONS  (STANDING):  atorvastatin 40 milliGRAM(s) Oral at bedtime  carvedilol 6.25 milliGRAM(s) Oral every 12 hours  cilostazol 50 milliGRAM(s) Oral every 12 hours  clopidogrel Tablet 75 milliGRAM(s) Oral daily  dextrose 40% Gel 15 Gram(s) Oral once  dextrose 5%. 1000 milliLiter(s) (50 mL/Hr) IV Continuous <Continuous>  dextrose 5%. 1000 milliLiter(s) (100 mL/Hr) IV Continuous <Continuous>  dextrose 50% Injectable 25 Gram(s) IV Push once  dextrose 50% Injectable 12.5 Gram(s) IV Push once  dextrose 50% Injectable 25 Gram(s) IV Push once  glucagon  Injectable 1 milliGRAM(s) IntraMuscular once  heparin   Injectable 5000 Unit(s) SubCutaneous every 8 hours  insulin glargine Injectable (LANTUS) 10 Unit(s) SubCutaneous at bedtime  insulin lispro (ADMELOG) corrective regimen sliding scale   SubCutaneous three times a day before meals  lisinopril 2.5 milliGRAM(s) Oral daily  midodrine. 5 milliGRAM(s) Oral <User Schedule>  mupirocin 2% Ointment 1 Application(s) Topical two times a day  Nephro-bridger 1 Tablet(s) Oral daily  piperacillin/tazobactam IVPB.. 3.375 Gram(s) IV Intermittent every 12 hours    MEDICATIONS  (PRN):  acetaminophen     Tablet .. 650 milliGRAM(s) Oral every 6 hours PRN Mild Pain (1 - 3)  oxyCODONE    IR 5 milliGRAM(s) Oral every 6 hours PRN Moderate Pain (4 - 6)      OBJECTIVE:    Vital Signs Last 24 Hrs  T(C): 37 (17 Jan 2022 02:03), Max: 37.1 (16 Jan 2022 21:22)  T(F): 98.6 (17 Jan 2022 02:03), Max: 98.7 (16 Jan 2022 21:22)  HR: 100 (17 Jan 2022 02:03) (84 - 110)  BP: 86/52 (17 Jan 2022 02:03) (86/52 - 112/67)  BP(mean): --  RR: 18 (17 Jan 2022 02:03) (18 - 18)  SpO2: 97% (17 Jan 2022 02:03) (95% - 97%)    Physical Exam:  GEN: resting in bed comfortably in NAD  NEURO: awake, alert  RESP: no increased WOB  EXTR: R 1st toe with gangrenous wound on distal aspect of toe, no malodor or purulence, no s/s of wet conversion  Left groin dressing dry, mildly tender, no hematoma/PSA, no thrill appreciated.   Right DP and PT doppler+.       I&O's Detail    15 Arnold 2022 07:01  -  16 Jan 2022 07:00  --------------------------------------------------------  IN:    Oral Fluid: 360 mL  Total IN: 360 mL    OUT:    Other (mL): 2500 mL  Total OUT: 2500 mL    Total NET: -2140 mL      16 Jan 2022 07:01  -  17 Jan 2022 03:00  --------------------------------------------------------  IN:    Oral Fluid: 480 mL  Total IN: 480 mL    OUT:    Voided (mL): 0 mL  Total OUT: 0 mL    Total NET: 480 mL          Daily     Daily     LABS:                        10.0   7.04  )-----------( 159      ( 17 Jan 2022 00:06 )             32.4     01-17    135  |  94<L>  |  43<H>  ----------------------------<  137<H>  4.2   |  23  |  8.36<H>    Ca    9.4      17 Jan 2022 00:06  Phos  6.1     01-17  Mg     2.3     01-17

## 2022-01-17 NOTE — PROGRESS NOTE ADULT - SUBJECTIVE AND OBJECTIVE BOX
DATE OF SERVICE: 01-17-22 @ 07:13    Subjective: Patient seen and examined. No new events except as noted.     SUBJECTIVE/ROS:  feels ok   had covid   No chest pain, dyspnea, palpitation, or dizziness.       MEDICATIONS:  MEDICATIONS  (STANDING):  atorvastatin 40 milliGRAM(s) Oral at bedtime  carvedilol 6.25 milliGRAM(s) Oral every 12 hours  cilostazol 50 milliGRAM(s) Oral every 12 hours  clopidogrel Tablet 75 milliGRAM(s) Oral daily  dextrose 40% Gel 15 Gram(s) Oral once  dextrose 5%. 1000 milliLiter(s) (50 mL/Hr) IV Continuous <Continuous>  dextrose 5%. 1000 milliLiter(s) (100 mL/Hr) IV Continuous <Continuous>  dextrose 50% Injectable 25 Gram(s) IV Push once  dextrose 50% Injectable 12.5 Gram(s) IV Push once  dextrose 50% Injectable 25 Gram(s) IV Push once  glucagon  Injectable 1 milliGRAM(s) IntraMuscular once  heparin   Injectable 5000 Unit(s) SubCutaneous every 8 hours  insulin glargine Injectable (LANTUS) 10 Unit(s) SubCutaneous at bedtime  insulin lispro (ADMELOG) corrective regimen sliding scale   SubCutaneous three times a day before meals  lisinopril 2.5 milliGRAM(s) Oral daily  midodrine. 5 milliGRAM(s) Oral <User Schedule>  mupirocin 2% Ointment 1 Application(s) Topical two times a day  Nephro-bridger 1 Tablet(s) Oral daily  piperacillin/tazobactam IVPB.. 3.375 Gram(s) IV Intermittent every 12 hours      PHYSICAL EXAM:  T(C): 36.9 (01-17-22 @ 05:45), Max: 37.1 (01-16-22 @ 21:22)  HR: 100 (01-17-22 @ 05:45) (85 - 110)  BP: 100/62 (01-17-22 @ 05:45) (86/52 - 117/75)  RR: 18 (01-17-22 @ 05:45) (18 - 18)  SpO2: 98% (01-17-22 @ 05:45) (95% - 98%)  Wt(kg): --  I&O's Summary    16 Jan 2022 07:01  -  17 Jan 2022 07:00  --------------------------------------------------------  IN: 600 mL / OUT: 0 mL / NET: 600 mL            JVP: Normal  Neck: supple  Lung: clear   CV: S1 S2 , Murmur:  Abd: soft  Ext: No edema  neuro: Awake / alert  Psych: flat affect  Skin: normal``    LABS/DATA:    CARDIAC MARKERS:  CARDIAC MARKERS ( 17 Jan 2022 00:06 )  x     / x     / 31 U/L / x     / 1.4 ng/mL                                10.0   7.04  )-----------( 159      ( 17 Jan 2022 00:06 )             32.4     01-17    135  |  94<L>  |  43<H>  ----------------------------<  137<H>  4.2   |  23  |  8.36<H>    Ca    9.4      17 Jan 2022 00:06  Phos  6.1     01-17  Mg     2.3     01-17      proBNP:   Lipid Profile:   HgA1c:   TSH:     TELE:  EKG:

## 2022-01-17 NOTE — PROGRESS NOTE ADULT - ASSESSMENT
50yo M w/ h/o DM, CAD s/p CABG, h/o chronic systolic CHF but latest outptn EF improved to 50% here its 40% on this admission,  ESRD on HD, DIabetic neuropathy, doming in w diabetic foot ulcer with OM of right foot hallux wound to bone  - pt seen and evaluated by podiatry and vascular  -  ESR elevated   - Right foot hallux nailbed wound probing to bone, erythema to MPJ, dactylitis of hallux, no purulence, no malodor, no tracking, no fluctuance   - Right foot wound culture taken by podiatry. but not resulted in sunrise, blood Cx NTD. ID following, on Zosyn, s/p Vanco  - Pt seen by vascular as well:  Dr. Malone at bedside, s/p RLE angio via L CFA access, stenting of mid and distal SFA as well as popliteal and PT trunk, with mynx closure. placed on Pletal, Plavix, off ASA  - covid positive on 1/16  - plan for partial foot amp by podiatry today, medically cleared, not a candidate for monoclonal Abx for treatment of acute covid viral illness  - HD as per renal  - CAD/ systolic CHF stable, card following, on BB, ACE-I, on pletal and Plavix, taken off ASA  - TTE reviewed EF 40%  - Pod plan for right foot partial 1st ray resection today,  - DM, on Insulin on a sliding scale, numbers are stable  - dvt ppx w HSC  - cont outptn meds

## 2022-01-17 NOTE — BRIEF OPERATIVE NOTE - COMMENTS
s/p right foot resection of osteomyelitis hallux, good intraop bleeding, gone bone quality at the level of resection, low concern for viability, low concern for soft tissue/bone infection, discharge pending clean bone no growth x 2 days

## 2022-01-17 NOTE — PROGRESS NOTE ADULT - SUBJECTIVE AND OBJECTIVE BOX
Infectious Diseases progress note:    Subjective: Events noted.  Denies fever/cough/sob/cp.   Tested covid (+) on repeat pcr 1/16.  Pt satting well on RA.  Pt states he has been fully vaccinated and boosted.     ROS:  CONSTITUTIONAL:  No fever, chills, rigors  CARDIOVASCULAR:  No chest pain or palpitations  RESPIRATORY:   No SOB, cough, dyspnea on exertion.  No wheezing  GASTROINTESTINAL:  No abd pain, N/V, diarrhea/constipation  EXTREMITIES:  No swelling or joint pain  GENITOURINARY:  No burning on urination, increased frequency or urgency.  No flank pain  NEUROLOGIC:  No HA, visual disturbances  SKIN: No rashes    Allergies    No Known Allergies    Intolerances        ANTIBIOTICS/RELEVANT:  antimicrobials  piperacillin/tazobactam IVPB.. 3.375 Gram(s) IV Intermittent every 12 hours    immunologic:    OTHER:  acetaminophen     Tablet .. 650 milliGRAM(s) Oral every 6 hours PRN  atorvastatin 40 milliGRAM(s) Oral at bedtime  carvedilol 6.25 milliGRAM(s) Oral every 12 hours  cilostazol 50 milliGRAM(s) Oral every 12 hours  clopidogrel Tablet 75 milliGRAM(s) Oral daily  dextrose 40% Gel 15 Gram(s) Oral once  dextrose 5%. 1000 milliLiter(s) IV Continuous <Continuous>  dextrose 5%. 1000 milliLiter(s) IV Continuous <Continuous>  dextrose 50% Injectable 25 Gram(s) IV Push once  dextrose 50% Injectable 12.5 Gram(s) IV Push once  dextrose 50% Injectable 25 Gram(s) IV Push once  glucagon  Injectable 1 milliGRAM(s) IntraMuscular once  heparin   Injectable 5000 Unit(s) SubCutaneous every 8 hours  insulin glargine Injectable (LANTUS) 10 Unit(s) SubCutaneous at bedtime  insulin lispro (ADMELOG) corrective regimen sliding scale   SubCutaneous three times a day before meals  lisinopril 2.5 milliGRAM(s) Oral daily  midodrine. 5 milliGRAM(s) Oral <User Schedule>  mupirocin 2% Ointment 1 Application(s) Topical two times a day  Nephro-bridger 1 Tablet(s) Oral daily  oxyCODONE    IR 5 milliGRAM(s) Oral every 6 hours PRN      Objective:  Vital Signs Last 24 Hrs  T(C): 36.8 (17 Jan 2022 15:37), Max: 37.1 (16 Jan 2022 21:22)  T(F): 98.2 (17 Jan 2022 12:43), Max: 98.7 (16 Jan 2022 21:22)  HR: 92 (17 Jan 2022 15:37) (85 - 110)  BP: 123/71 (17 Jan 2022 15:37) (86/52 - 123/71)  BP(mean): --  RR: 18 (17 Jan 2022 15:37) (18 - 18)  SpO2: 99% (17 Jan 2022 15:37) (95% - 99%)    PHYSICAL EXAM:  Constitutional:NAD  Eyes:THOM, EOMI  Ear/Nose/Throat: no thrush, mucositis.  Moist mucous membranes	  Neck:no JVD, no lymphadenopathy, supple  Respiratory: CTA callie  Cardiovascular: S1S2 RRR, no murmurs  Gastrointestinal:soft, nontender,  nondistended (+) BS  Extremities:no e/e/c  Skin:  rt ft dsg c/d/i        LABS:                        9.8    6.11  )-----------( 151      ( 17 Jan 2022 06:53 )             31.1     01-17    135  |  95<L>  |  48<H>  ----------------------------<  104<H>  4.1   |  22  |  8.87<H>    Ca    9.3      17 Jan 2022 06:53  Phos  7.0     01-17  Mg     2.4     01-17      PT/INR - ( 17 Jan 2022 06:53 )   PT: 13.7 sec;   INR: 1.15 ratio         PTT - ( 17 Jan 2022 06:53 )  PTT:36.0 sec        Vancomycin Level, Random:  ug/mL (01-15 @ 08:31)                  MICROBIOLOGY:    Culture - Blood (01.13.22 @ 22:53)   Specimen Source: .Blood Blood   Culture Results:   No growth to date.     Culture - Blood (01.13.22 @ 22:53)   Specimen Source: .Blood Blood   Culture Results:   No growth to date.     COVID-19 PCR . (01.16.22 @ 18:34)   COVID-19 PCR: Detected: You can help in the fight against COVID-19. [facility] may contact   you to see if you are interested in voluntarily participating in one of   our clinical trials.     COVID-19 PCR . (01.16.22 @ 11:41)   COVID-19 PCR: Detected: You can help in the fight against COVID-19. [facility] may contact   you to see if you are interested in voluntarily participating in one of   our clinical trials.       RADIOLOGY & ADDITIONAL STUDIES:    < from: Xray Chest 1 View AP/PA (01.16.22 @ 11:59) >   XR CHEST AP OR PA 1V                          PROCEDURE DATE:  01/16/2022          INTERPRETATION:  Clinical Information: Dyspnea    Technique: AP chest x-ray(s).    Comparison: None    Findings/  Impression: Status post CABG. Status post defibrillator. Cardiomegaly.   Small left pleural effusion.    < end of copied text >

## 2022-01-17 NOTE — PROGRESS NOTE ADULT - ASSESSMENT
left foot ulcer   on anbx  RLE angio via L CFA access, stenting of mid and distal SFA as well as popliteal and PT trunk, with mynx closure.  plan for partial foot amp by podiatry     CAD s/p cabg  stable   asa, statin    chronic systolic chf  stable  improved LV function   cont BB / ace    ESRD  On HD. follow up with renal. Monitor electrolytes, K, ca. Avoid significant nephrotoxic medications.    DM II  Monitor finger stick. Insulin coverage. Diabetic education and Diabetic diet. Consider nutrition consultation.    mild tachycardia   asymptomatic  likely from Covid    Pre-Operative Cardiac Risk Stratification and Optimization  Based on patient history and physical exam, the patient is considered to have elevated risk   recent echo in my office last week showed EF 50 percent   pt has no active or symptomatic cardiac issues to preclude planned podiatry surgery   can proceed with acceptable elevated risk for this low risk procedure

## 2022-01-17 NOTE — DISCHARGE NOTE PROVIDER - HOSPITAL COURSE
52y/o Male who presented on 1/13 with a chief complaint of R foot hallux wound to bone  Started with ulcer 3 wks ago, initially placed on abx for last 10d which he completed. Pt finished antibx and presented to Podiatrist who found wound was open, draining pus and probing to bone, thus sent to ED. Patient denies trauma to foot, unclear why swelling happened in the beginning. Has h/o L 5th toe OM that led to bone removal. Ambulates at baseline without assistance. Patient started on IV antibx, admitted to vascular surgery and podiatry was consulted. Clearance was obtained for an angiogram by medicine. On 1/14 patient underwent RLE angio via L CFA access. Stenosis of mid and distal SFA as well as Pop and PT trunk. All 4 lesions stented. Single vessel run off to foot via peroneal. Post stent angio sig for improved flow to foot. No distal embolization. Mynx closure. Patient was cleared for podiatry procedure after and underwent right foot resection of osteomyelitis hallux on 1/17. He tolerated the procedure and was seen by Pt.   Infectious disease was consulted during stay and all recs were followed. Dialysis was reinstated. Patient clear for d/c from Podiatry standpoint with 10 days of PO Augmentin 875mg and OP follow up.  On day of discharge, the patient was tolerating diet, ambulating well and pain controlled. All questions were answered and patient safely discharged home.    52y/o Male who presented on 1/13 with a chief complaint of R foot hallux wound to bone  Started with ulcer 3 wks ago, initially placed on abx for last 10d which he completed. Pt finished antibx and presented to Podiatrist who found wound was open, draining pus and probing to bone, thus sent to ED. Patient denies trauma to foot, unclear why swelling happened in the beginning. Has h/o L 5th toe OM that led to bone removal. Ambulates at baseline without assistance. Patient started on IV antibx, admitted to vascular surgery and podiatry was consulted. Clearance was obtained for an angiogram by medicine. On 1/14 patient underwent RLE angio via L CFA access. Stenosis of mid and distal SFA as well as Pop and PT trunk. All 4 lesions stented. Single vessel run off to foot via peroneal. Post stent angio sig for improved flow to foot. No distal embolization. Mynx closure. Patient was cleared for podiatry procedure after and underwent right foot resection of osteomyelitis hallux on 1/17. He tolerated the procedure and was seen by Pt.   Infectious disease was consulted during stay and all recs were followed. Dialysis was reinstated. Patient clear for d/c from Podiatry standpoint with 10 days of PO Augmentin 875mg and OP follow up.  On day of discharge, the patient was tolerating diet, ambulating well and pain controlled. All questions were answered and patient safely discharged home. 50y/o Male who presented on 1/13 with a chief complaint of R foot hallux wound to bone.  Started with ulcer 3 wks ago, initially placed on abx for last 10d which he completed. Pt finished antibx and presented to Podiatrist who found wound was open, draining pus and probing to bone, thus sent to ED. Patient denies trauma to foot, unclear why swelling happened in the beginning. Has h/o L 5th toe OM that led to bone removal. Ambulates at baseline without assistance. Patient started on IV antibx, admitted to vascular surgery and podiatry was consulted. Clearance was obtained for an angiogram by medicine. On 1/14 patient underwent RLE angio via L CFA access. Stenosis of mid and distal SFA as well as Pop and PT trunk. All 4 lesions stented. Single vessel run off to foot via peroneal. Post stent angio sig for improved flow to foot. No distal embolization. Mynx closure. Patient was cleared for podiatry procedure after and underwent right foot resection of osteomyelitis hallux on 1/17. He tolerated the procedure and was seen by PT, who recommended outpatient PT.    Infectious disease was consulted during stay and all recs were followed. Dialysis was reinstated. Patient clear for d/c from Podiatry standpoint with 10 days of PO Augmentin 875mg and OP follow up.  On day of discharge, the patient was tolerating diet, ambulating well and pain controlled. All questions were answered and patient safely discharged home.

## 2022-01-17 NOTE — PROGRESS NOTE ADULT - ASSESSMENT
50yo M with PMH of DM, ESRD TTS presents to ED from Dr Myers podiatry office for eval of R 1st toe gangrene.   Started with ulcer 3 wks ago, initially placed on abx for last 10d which he completed. Today went to podiatry and found that ulcer was still open and when probed, went to bone. Painless as pt has no sensation in toes due to diabetes. No fever but did have chills. No streaking up leg. Had previous bone removal in L 5th toe 2/2 OM. Otherwise feels well. No CP, SOB, cough, abd pain,fever, chills (13 Jan 2022 20:10)  Pt at cardiac angiogram at time of bedside visit.     R ft OM:    - Pt afebrile.  Xray shows ulcerated tip of R hallux.  Pt seen by Podiatry, found to have PTB.  ESR 75, CRP 11.   No purulence/tracking/malodor/fluctuance reported.    - f/u wound cx.  Pt started on vanco x 1 and zosyn.  Check next vanco level on 1/16 AM and redose if <20    - Planned for partial hallux amputation.  For OR on 1/17     - Vascular following, s/p RLE angiogram. (1/14) s/p stenting of mid and distal SFA as well as popliteal and PT trunk      ESRD on dialysis:    - Renally dose meds.  Vanco dosed by level.    - Nephrology f/u appreciated.  On HD      Hypertension:    - Cont home BP meds with hold parameters    DM II:    - HgA1c, monitor FS, Cont insulin regimen    Covid (+):    - Repeat covid (+) on 1/16.  d/w Pharmacy yesterday, pt not eligible for Sotrovimab given pt under 65 yrs and has been fully vaccinated, despite h/o ESRD and DM.      - Currently not hypoxic.  No role to start remdesivir/dexamethasone.      * f/u OR cultures, will adjust abx according.  Currently on zosyn.  Can given vanco 1gm IV x 1 after OR.       Symone Freire  [phone]

## 2022-01-17 NOTE — PRE-ANESTHESIA EVALUATION ADULT - NSANTHPMHFT_GEN_ALL_CORE
50 yo m.  w/ left foot ulcer. currently on abx, s/p rle angio via L CFA access, stenting of mid and distal SFA as well as popliteal and PT trunk, with mynx closure. Now planned for partial foot amp by podiatry   Pmhx CAD s/p cabg, schf ef 50% per cardiology note last echo and cleared by cardiology, ESRD on hd, DM II, covid +

## 2022-01-17 NOTE — DISCHARGE NOTE PROVIDER - PROVIDER TOKENS
PROVIDER:[TOKEN:[[id number]],FOLLOWUP:[1 week]],PROVIDER:[TOKEN:[[id number]],FOLLOWUP:[2 weeks]]

## 2022-01-17 NOTE — BRIEF OPERATIVE NOTE - OPERATION/FINDINGS
s/p right foot resection of osteomyelitis hallux, good intraop bleeding, gone bone quality at the level of resection, low concern for viability, low concern for soft tissue/bone infection
RLE angio via L CFA access. Stenosis of mid and distal SFA as well as Pop and PT trunk. All 4 lesions stented. Single vessel run off to foot via peroneal. Post stent angio sig for improved flow to foot. No distal embolization. Mynx closure. Pressure held for 25 min.

## 2022-01-17 NOTE — DISCHARGE NOTE PROVIDER - NSDCMRMEDTOKEN_GEN_ALL_CORE_FT
aspirin 81 mg oral tablet, chewable: 1 tab(s) orally once a day  atorvastatin 40 mg oral tablet: 1 tab(s) orally once a day (at bedtime)  carvedilol 6.25 mg oral tablet: 1 tab(s) orally 2 times a day  Januvia 100 mg oral tablet: 1 tab(s) orally once a day  lisinopril 2.5 mg oral tablet: 1 tab(s) orally once a day  midodrine 5 mg oral tablet: 1 tab(s) orally 3 times a week on dialysis days ( Tuesday, Thursday, Saturday)   aspirin 81 mg oral tablet, chewable: 1 tab(s) orally once a day  atorvastatin 40 mg oral tablet: 1 tab(s) orally once a day (at bedtime)  carvedilol 6.25 mg oral tablet: 1 tab(s) orally every 12 hours  cilostazol 50 mg oral tablet: 1 tab(s) orally every 12 hours  clopidogrel 75 mg oral tablet: 1 tab(s) orally once a day  Januvia 100 mg oral tablet: 1 tab(s) orally once a day  lisinopril 2.5 mg oral tablet: 1 tab(s) orally once a day  midodrine 5 mg oral tablet: 1 tab(s) orally   multivitamin: 1  Outpatient Physical Therapy :    amoxicillin-clavulanate 875 mg-125 mg oral tablet: 875 milligram(s) orally 2 times a day   aspirin 81 mg oral tablet, chewable: 1 tab(s) orally once a day  atorvastatin 40 mg oral tablet: 1 tab(s) orally once a day (at bedtime)  carvedilol 6.25 mg oral tablet: 1 tab(s) orally every 12 hours  cilostazol 50 mg oral tablet: 1 tab(s) orally every 12 hours  clopidogrel 75 mg oral tablet: 1 tab(s) orally once a day  Januvia 100 mg oral tablet: 1 tab(s) orally once a day  lisinopril 2.5 mg oral tablet: 1 tab(s) orally once a day  midodrine 5 mg oral tablet: 1 tab(s) orally 3 times a week  WITH DIALYSIS*  multivitamin: Nephrovite 1    tab(s) orally once a day  Outpatient Physical Therapy :   oxycodone-acetaminophen 5 mg-325 mg oral tablet: 1 tab(s) orally every 6 hours, As Needed -Moderate Pain (4 - 6) MDD:4

## 2022-01-17 NOTE — DISCHARGE NOTE PROVIDER - NSDCCPCAREPLAN_GEN_ALL_CORE_FT
PRINCIPAL DISCHARGE DIAGNOSIS  Diagnosis: Diabetic foot ulcer  Assessment and Plan of Treatment: You have had a procedure done by Podiatry in the hospital.  Please follow up in one week with podiatry.  Please continue Augmentin 875mg once daily for 10 days.  Wound Care: Please leave your dressing clean dry intact until your follow up appointment.  Weight bearing: Please weight bear as tolerated to the right heel in a surgical shoe.

## 2022-01-17 NOTE — BRIEF OPERATIVE NOTE - SPECIMENS
Micro 1 - right hallux dirty bone culture, Micro 2- right hallux clean bone culture, Path 1 - right hallux dirty bone and soft tissue, Path 2- right hallux clean bone margin
None

## 2022-01-17 NOTE — BRIEF OPERATIVE NOTE - NSICDXBRIEFPROCEDURE_GEN_ALL_CORE_FT
PROCEDURES:  Angio fluoro arteries of extremity lower right 14-Jan-2022 18:09:29  Hasmukh Santos  
PROCEDURES:  Excision, bone, foot, for osteomyelitis 17-Jan-2022 18:12:49  Yvonne Peres

## 2022-01-17 NOTE — DISCHARGE NOTE PROVIDER - CARE PROVIDER_API CALL
Gene Myers (DPMIKHAIL)  Podiatric Medicine and Surgery  [address], [place] [address]  Phone: [phone]  Fax: [phone]  Follow Up Time: 1 week    Leonardo Malone)  Vascular Surgery  [address], [address] [address]  Phone: [phone]  Fax: [phone]  Follow Up Time: 2 weeks

## 2022-01-17 NOTE — DISCHARGE NOTE PROVIDER - NSDCFUADDINST_GEN_ALL_CORE_FT
Podiatry Discharge Instructions:  Follow up: Please follow up with Dr. Myers within 1 week of discharge from the hospital, please call [phone] for appointment and discuss that you recently were seen in the hospital.  Wound Care: Please leave your dressing clean dry intact until your follow up appointment.  Weight bearing: Please weight bear as tolerated to the right heel in a surgical shoe.  Antibiotics: Please continue as instructed.

## 2022-01-17 NOTE — BRIEF OPERATIVE NOTE - NSICDXBRIEFPREOP_GEN_ALL_CORE_FT
PRE-OP DIAGNOSIS:  Foot osteomyelitis, right 17-Jan-2022 18:13:10  Yvonne Peres  
PRE-OP DIAGNOSIS:  PAD (peripheral artery disease) 14-Jan-2022 18:09:38  Hasmukh Santos

## 2022-01-17 NOTE — PROGRESS NOTE ADULT - SUBJECTIVE AND OBJECTIVE BOX
Patient is a 51y old  Male who presents with a chief complaint of LLE angiogram (17 Jan 2022 02:59)      SUBJECTIVE / OVERNIGHT EVENTS: awaiting partial foot amputation today    MEDICATIONS  (STANDING):  atorvastatin 40 milliGRAM(s) Oral at bedtime  carvedilol 6.25 milliGRAM(s) Oral every 12 hours  cilostazol 50 milliGRAM(s) Oral every 12 hours  clopidogrel Tablet 75 milliGRAM(s) Oral daily  dextrose 40% Gel 15 Gram(s) Oral once  dextrose 5%. 1000 milliLiter(s) (50 mL/Hr) IV Continuous <Continuous>  dextrose 5%. 1000 milliLiter(s) (100 mL/Hr) IV Continuous <Continuous>  dextrose 50% Injectable 25 Gram(s) IV Push once  dextrose 50% Injectable 12.5 Gram(s) IV Push once  dextrose 50% Injectable 25 Gram(s) IV Push once  glucagon  Injectable 1 milliGRAM(s) IntraMuscular once  heparin   Injectable 5000 Unit(s) SubCutaneous every 8 hours  insulin glargine Injectable (LANTUS) 10 Unit(s) SubCutaneous at bedtime  insulin lispro (ADMELOG) corrective regimen sliding scale   SubCutaneous three times a day before meals  lisinopril 2.5 milliGRAM(s) Oral daily  midodrine. 5 milliGRAM(s) Oral <User Schedule>  mupirocin 2% Ointment 1 Application(s) Topical two times a day  Nephro-bridger 1 Tablet(s) Oral daily  piperacillin/tazobactam IVPB.. 3.375 Gram(s) IV Intermittent every 12 hours    MEDICATIONS  (PRN):  acetaminophen     Tablet .. 650 milliGRAM(s) Oral every 6 hours PRN Mild Pain (1 - 3)  oxyCODONE    IR 5 milliGRAM(s) Oral every 6 hours PRN Moderate Pain (4 - 6)      Vital Signs Last 24 Hrs  T(F): 98.2 (01-17-22 @ 12:43), Max: 98.7 (01-16-22 @ 21:22)  HR: 92 (01-17-22 @ 15:37) (85 - 110)  BP: 123/71 (01-17-22 @ 15:37) (86/52 - 123/71)  RR: 18 (01-17-22 @ 15:37) (18 - 18)  SpO2: 99% (01-17-22 @ 15:37) (95% - 99%)  Telemetry:   CAPILLARY BLOOD GLUCOSE      POCT Blood Glucose.: 135 mg/dL (17 Jan 2022 12:08)  POCT Blood Glucose.: 135 mg/dL (16 Jan 2022 21:49)  POCT Blood Glucose.: 145 mg/dL (16 Jan 2022 17:35)    I&O's Summary    16 Jan 2022 07:01  -  17 Jan 2022 07:00  --------------------------------------------------------  IN: 600 mL / OUT: 0 mL / NET: 600 mL    17 Jan 2022 07:01  -  17 Jan 2022 16:49  --------------------------------------------------------  IN: 0 mL / OUT: 0 mL / NET: 0 mL        PHYSICAL EXAM:  GENERAL: NAD, well-developed  HEAD:  Atraumatic, Normocephalic  EYES: EOMI, PERRLA, conjunctiva and sclera clear  NECK: Supple, No JVD  CHEST/LUNG: Clear to auscultation bilaterally; No wheeze  HEART: Regular rate and rhythm; No murmurs, rubs, or gallops  ABDOMEN: Soft, Nontender, Nondistended; Bowel sounds present  EXTREMITIES:  2+ Peripheral Pulses, No clubbing, cyanosis, or edema  PSYCH: AAOx3  NEUROLOGY: non-focal  SKIN: No rashes or lesions    LABS:                        9.8    6.11  )-----------( 151      ( 17 Jan 2022 06:53 )             31.1     01-17    135  |  95<L>  |  48<H>  ----------------------------<  104<H>  4.1   |  22  |  8.87<H>    Ca    9.3      17 Jan 2022 06:53  Phos  7.0     01-17  Mg     2.4     01-17      PT/INR - ( 17 Jan 2022 06:53 )   PT: 13.7 sec;   INR: 1.15 ratio         PTT - ( 17 Jan 2022 06:53 )  PTT:36.0 sec  CARDIAC MARKERS ( 17 Jan 2022 00:06 )  x     / x     / 31 U/L / x     / 1.4 ng/mL          RADIOLOGY & ADDITIONAL TESTS:    Imaging Personally Reviewed:    Consultant(s) Notes Reviewed:      Care Discussed with Consultants/Other Providers:

## 2022-01-17 NOTE — DISCHARGE NOTE PROVIDER - NSDCCPTREATMENT_GEN_ALL_CORE_FT
PRINCIPAL PROCEDURE  Procedure: Angio fluoro arteries of extremity lower right  Findings and Treatment:       SECONDARY PROCEDURE  Procedure: Excision, bone, foot, for osteomyelitis  Findings and Treatment:

## 2022-01-17 NOTE — PROGRESS NOTE ADULT - ASSESSMENT
a/p 51M with PMH of DM, ESRD TTS (s/p L AVF by Dr. Malone in 2017) presents with right foot hallux wound to bone, s/p RLE angio via L CFA access, stenting of mid and distal SFA as well as popliteal and PT trunk, with mynx closure (1/14/22)    - H/H 10/32.4. low suspicion for bleeding. will give  cc bolus. repeat labs in 4 hours  - ECG showed sinus tachycardia with ?septal infart, similar to previous ECG. troponin 148, CK31, CKMB 1.4. will repeat trop in 4 hours  - NPO after 6AM  - OR for right partial 1st ray amputation with Podiatry 3 PM today  - Pain control  - DVT ppx    Vascular Surgery   [id number]   51M with PMH of DM, ESRD TTS (s/p L AVF by Dr. Malone in 2017) presents with right foot hallux wound to bone, s/p RLE angio via L CFA access, stenting of mid and distal SFA as well as popliteal and PT trunk, with mynx closure (1/14/22)    PLAN  - ECG showed sinus tachycardia with ?septal infart, similar to previous ECG. troponin 148, CK31, CKMB 1.4  - Low suspicion of cardiac etiology - Trop trend flat  - NPO after 6AM  - OR for right partial 1st ray amputation with Podiatry 3PM today  - Pain control  - DVT ppx    Vascular Surgery   [id number]

## 2022-01-18 LAB
ANION GAP SERPL CALC-SCNC: 20 MMOL/L — HIGH (ref 5–17)
BUN SERPL-MCNC: 61 MG/DL — HIGH (ref 7–23)
CALCIUM SERPL-MCNC: 9.2 MG/DL — SIGNIFICANT CHANGE UP (ref 8.4–10.5)
CHLORIDE SERPL-SCNC: 96 MMOL/L — SIGNIFICANT CHANGE UP (ref 96–108)
CO2 SERPL-SCNC: 22 MMOL/L — SIGNIFICANT CHANGE UP (ref 22–31)
CREAT SERPL-MCNC: 11.31 MG/DL — HIGH (ref 0.5–1.3)
CULTURE RESULTS: SIGNIFICANT CHANGE UP
CULTURE RESULTS: SIGNIFICANT CHANGE UP
GLUCOSE BLDC GLUCOMTR-MCNC: 175 MG/DL — HIGH (ref 70–99)
GLUCOSE BLDC GLUCOMTR-MCNC: 82 MG/DL — SIGNIFICANT CHANGE UP (ref 70–99)
GLUCOSE BLDC GLUCOMTR-MCNC: 92 MG/DL — SIGNIFICANT CHANGE UP (ref 70–99)
GLUCOSE SERPL-MCNC: 68 MG/DL — LOW (ref 70–99)
HCT VFR BLD CALC: 30.3 % — LOW (ref 39–50)
HGB BLD-MCNC: 9.4 G/DL — LOW (ref 13–17)
MAGNESIUM SERPL-MCNC: 2.5 MG/DL — SIGNIFICANT CHANGE UP (ref 1.6–2.6)
MCHC RBC-ENTMCNC: 29.5 PG — SIGNIFICANT CHANGE UP (ref 27–34)
MCHC RBC-ENTMCNC: 31 GM/DL — LOW (ref 32–36)
MCV RBC AUTO: 95 FL — SIGNIFICANT CHANGE UP (ref 80–100)
NRBC # BLD: 0 /100 WBCS — SIGNIFICANT CHANGE UP (ref 0–0)
PHOSPHATE SERPL-MCNC: 7.9 MG/DL — HIGH (ref 2.5–4.5)
PLATELET # BLD AUTO: 155 K/UL — SIGNIFICANT CHANGE UP (ref 150–400)
POTASSIUM SERPL-MCNC: 4 MMOL/L — SIGNIFICANT CHANGE UP (ref 3.5–5.3)
POTASSIUM SERPL-SCNC: 4 MMOL/L — SIGNIFICANT CHANGE UP (ref 3.5–5.3)
RBC # BLD: 3.19 M/UL — LOW (ref 4.2–5.8)
RBC # FLD: 17.3 % — HIGH (ref 10.3–14.5)
SODIUM SERPL-SCNC: 138 MMOL/L — SIGNIFICANT CHANGE UP (ref 135–145)
SPECIMEN SOURCE: SIGNIFICANT CHANGE UP
SPECIMEN SOURCE: SIGNIFICANT CHANGE UP
WBC # BLD: 7.06 K/UL — SIGNIFICANT CHANGE UP (ref 3.8–10.5)
WBC # FLD AUTO: 7.06 K/UL — SIGNIFICANT CHANGE UP (ref 3.8–10.5)

## 2022-01-18 PROCEDURE — 99232 SBSQ HOSP IP/OBS MODERATE 35: CPT

## 2022-01-18 PROCEDURE — 99232 SBSQ HOSP IP/OBS MODERATE 35: CPT | Mod: GC

## 2022-01-18 RX ADMIN — MIDODRINE HYDROCHLORIDE 5 MILLIGRAM(S): 2.5 TABLET ORAL at 11:23

## 2022-01-18 RX ADMIN — CILOSTAZOL 50 MILLIGRAM(S): 100 TABLET ORAL at 17:46

## 2022-01-18 RX ADMIN — CILOSTAZOL 50 MILLIGRAM(S): 100 TABLET ORAL at 06:53

## 2022-01-18 RX ADMIN — ATORVASTATIN CALCIUM 40 MILLIGRAM(S): 80 TABLET, FILM COATED ORAL at 21:53

## 2022-01-18 RX ADMIN — HEPARIN SODIUM 5000 UNIT(S): 5000 INJECTION INTRAVENOUS; SUBCUTANEOUS at 06:28

## 2022-01-18 RX ADMIN — Medication 1 TABLET(S): at 11:23

## 2022-01-18 RX ADMIN — PIPERACILLIN AND TAZOBACTAM 25 GRAM(S): 4; .5 INJECTION, POWDER, LYOPHILIZED, FOR SOLUTION INTRAVENOUS at 17:46

## 2022-01-18 RX ADMIN — LISINOPRIL 2.5 MILLIGRAM(S): 2.5 TABLET ORAL at 06:29

## 2022-01-18 RX ADMIN — INSULIN GLARGINE 10 UNIT(S): 100 INJECTION, SOLUTION SUBCUTANEOUS at 21:53

## 2022-01-18 RX ADMIN — CARVEDILOL PHOSPHATE 6.25 MILLIGRAM(S): 80 CAPSULE, EXTENDED RELEASE ORAL at 06:29

## 2022-01-18 RX ADMIN — HEPARIN SODIUM 5000 UNIT(S): 5000 INJECTION INTRAVENOUS; SUBCUTANEOUS at 21:53

## 2022-01-18 RX ADMIN — PIPERACILLIN AND TAZOBACTAM 25 GRAM(S): 4; .5 INJECTION, POWDER, LYOPHILIZED, FOR SOLUTION INTRAVENOUS at 06:29

## 2022-01-18 RX ADMIN — CLOPIDOGREL BISULFATE 75 MILLIGRAM(S): 75 TABLET, FILM COATED ORAL at 11:23

## 2022-01-18 NOTE — PROGRESS NOTE ADULT - PROBLEM SELECTOR PLAN 2
Patient with anemia in the setting of ESRD.   Hemoglobin mildly below target range (9.7).   Monitor hemoglobin.

## 2022-01-18 NOTE — PROGRESS NOTE ADULT - ASSESSMENT
left foot ulcer   on anbx  RLE angio via L CFA access, stenting of mid and distal SFA as well as popliteal and PT trunk, with mynx closure.    CAD s/p cabg  stable   on plavix     chronic systolic chf  stable  improved LV function   cont BB / ace    ESRD  On HD. follow up with renal. Monitor electrolytes, K, ca. Avoid significant nephrotoxic medications.    DM II  Monitor finger stick. Insulin coverage. Diabetic education and Diabetic diet. Consider nutrition consultation.

## 2022-01-18 NOTE — PROGRESS NOTE ADULT - PROBLEM SELECTOR PLAN 1
Pt. with ESRD on HD three times a week (TTS) presented to [facility] for R toe gangrene.   Last outpatient HD was on Tuesday via [facility].   Will arrange for HD today.   Dose meds as per HD.

## 2022-01-18 NOTE — PHYSICAL THERAPY INITIAL EVALUATION ADULT - PERTINENT HX OF CURRENT PROBLEM, REHAB EVAL
50yo M with PMHx of DM, ESRD TTS presents to ED from Dr Myers podiatry office for eval of R 1st toe gangrene. CONTINUED:

## 2022-01-18 NOTE — PROGRESS NOTE ADULT - PROBLEM SELECTOR PLAN 3
phos above goal at 7.9   not on any phso binders   please start Renvela 800 TID with meals   monitor phos levels    If any questions, please feel free to contact me     Kris Vail  Nephrology Fellow  [facility] Pager: [phone]

## 2022-01-18 NOTE — PROGRESS NOTE ADULT - SUBJECTIVE AND OBJECTIVE BOX
Infectious Diseases progress note:    Subjective: POD #1 Rt ft 1st ray amputation.  Afebrile.  No acute o/n events.  Satting well on RA    ROS:  CONSTITUTIONAL:  No fever, chills, rigors  CARDIOVASCULAR:  No chest pain or palpitations  RESPIRATORY:   No SOB, cough, dyspnea on exertion.  No wheezing  GASTROINTESTINAL:  No abd pain, N/V, diarrhea/constipation  EXTREMITIES:  No swelling or joint pain  GENITOURINARY:  No burning on urination, increased frequency or urgency.  No flank pain  NEUROLOGIC:  No HA, visual disturbances  SKIN: No rashes    Allergies    No Known Allergies    Intolerances        ANTIBIOTICS/RELEVANT:  antimicrobials  piperacillin/tazobactam IVPB.. 3.375 Gram(s) IV Intermittent every 12 hours    immunologic:    OTHER:  atorvastatin 40 milliGRAM(s) Oral at bedtime  carvedilol 6.25 milliGRAM(s) Oral every 12 hours  cilostazol 50 milliGRAM(s) Oral every 12 hours  clopidogrel Tablet 75 milliGRAM(s) Oral daily  dextrose 40% Gel 15 Gram(s) Oral once  dextrose 5%. 1000 milliLiter(s) IV Continuous <Continuous>  dextrose 5%. 1000 milliLiter(s) IV Continuous <Continuous>  dextrose 50% Injectable 25 Gram(s) IV Push once  dextrose 50% Injectable 12.5 Gram(s) IV Push once  dextrose 50% Injectable 25 Gram(s) IV Push once  glucagon  Injectable 1 milliGRAM(s) IntraMuscular once  heparin   Injectable 5000 Unit(s) SubCutaneous every 8 hours  HYDROmorphone  Injectable 2 milliGRAM(s) IV Push every 4 hours PRN  insulin glargine Injectable (LANTUS) 10 Unit(s) SubCutaneous at bedtime  insulin lispro (ADMELOG) corrective regimen sliding scale   SubCutaneous three times a day before meals  insulin lispro (ADMELOG) corrective regimen sliding scale   SubCutaneous at bedtime  lisinopril 2.5 milliGRAM(s) Oral daily  midodrine. 5 milliGRAM(s) Oral <User Schedule>  Nephro-bridger 1 Tablet(s) Oral daily  oxycodone    5 mG/acetaminophen 325 mG 1 Tablet(s) Oral every 4 hours PRN      Objective:  Vital Signs Last 24 Hrs  T(C): 37.2 (18 Jan 2022 21:48), Max: 37.6 (18 Jan 2022 01:14)  T(F): 99 (18 Jan 2022 21:48), Max: 99.6 (18 Jan 2022 01:14)  HR: 100 (18 Jan 2022 21:48) (92 - 106)  BP: 126/69 (18 Jan 2022 21:48) (95/54 - 138/72)  BP(mean): --  RR: 16 (18 Jan 2022 21:48) (16 - 16)  SpO2: 94% (18 Jan 2022 21:48) (94% - 98%)    PHYSICAL EXAM:  Constitutional:NAD  Eyes:THOM, EOMI  Ear/Nose/Throat: no thrush, mucositis.  Moist mucous membranes	  Neck:no JVD, no lymphadenopathy, supple  Respiratory: CTA callie  Cardiovascular: S1S2 RRR, no murmurs  Gastrointestinal:soft, nontender,  nondistended (+) BS  Extremities:no e/e/c  Skin:  rt ft dsg c/d/i        LABS:                        9.4    7.06  )-----------( 155      ( 18 Jan 2022 07:58 )             30.3     01-18    138  |  96  |  61<H>  ----------------------------<  68<L>  4.0   |  22  |  11.31<H>    Ca    9.2      18 Jan 2022 07:58  Phos  7.9     01-18  Mg     2.5     01-18      PT/INR - ( 17 Jan 2022 06:53 )   PT: 13.7 sec;   INR: 1.15 ratio         PTT - ( 17 Jan 2022 06:53 )  PTT:36.0 sec        Vancomycin Level, Random:  ug/mL (01-15 @ 08:31)      MICROBIOLOGY:      Culture - Tissue with Gram Stain (01.17.22 @ 21:55)   Gram Stain:   No polymorphonuclear cells seen per low power field   No organisms seen per oil power field   Specimen Source: .Tissue Other   Culture Results:   No growth     Culture - Tissue with Gram Stain (01.17.22 @ 21:55)   Gram Stain:   No polymorphonuclear cells seen per low power field   No organisms seen per oil power field   Specimen Source: .Tissue Other   Culture Results:   No growth     RADIOLOGY & ADDITIONAL STUDIES:    < from: Xray Foot AP + Lateral + Oblique, Right (01.17.22 @ 19:57) >  IMPRESSION:  Status post hallux amputation through MTP joint level with post surgical   changes in the overlying soft tissues.    No proximally tracking gas collections beyond the amputation margins and   no focal areas of osteomyelitis.    Remainder of foot unchanged.    < end of copied text >        < from: Xray Chest 1 View AP/PA (01.16.22 @ 11:59) >  Findings/  Impression: Status post CABG. Status post defibrillator. Cardiomegaly.   Small left pleural effusion.    < end of copied text >

## 2022-01-18 NOTE — PROGRESS NOTE ADULT - ASSESSMENT
a/p  51M with PMH of DM, ESRD TTS (s/p L AVF by Dr. Malone in 2017) presents with right foot hallux wound to bone, s/p RLE angio via L CFA access, stenting of mid and distal SFA as well as popliteal and PT trunk, with mynx closure (1/14/22). POD #1 s/p resection of right hallux by Podiatry (1/17)      - Diet: diabetic diet  - Activity: OOB as tolerated, NWB RLE   - Labs: f/u AM labs   - Pain medication: tylenol, oxycodone prn   - DVT ppx: [facility]    Vascular Surgery   [id number] a/p  51M with PMH of DM, ESRD TTS (s/p L AVF by Dr. Malone in 2017) presents with right foot hallux wound to bone, s/p RLE angio via L CFA access, stenting of mid and distal SFA as well as popliteal and PT trunk, with mynx closure (1/14/22). POD #1 s/p resection of right hallux by Podiatry (1/17)      - Diet: diabetic diet  - Activity: OOB as tolerated, weight bearing as tolerated with surgical shoe on right heel  - Labs: f/u AM labs   - Pain medication: tylenol, oxycodone prn   - DVT ppx: [facility]    Vascular Surgery   [id number] a/p  51M with PMH of DM, ESRD TTS (s/p L AVF by Dr. Malone in 2017) presents with right foot hallux wound to bone, s/p RLE angio via L CFA access, stenting of mid and distal SFA as well as popliteal and PT trunk, with mynx closure (1/14/22). POD #1 s/p resection of right hallux by Podiatry (1/17)      - Diet: diabetic diet  - Activity: OOB as tolerated, weight bearing as tolerated with surgical shoe on right heel  - Labs: f/u AM labs   - Pain medication: tylenol, oxycodone prn   - DVT ppx: [facility]  - f/u OR bone cultures     Vascular Surgery   [id number] a/p  51M with PMH of DM, ESRD TTS (s/p L AVF by Dr. Malone in 2017) presents with right foot hallux wound to bone, s/p RLE angio via L CFA access, stenting of mid and distal SFA as well as popliteal and PT trunk, with mynx closure (1/14/22). POD #1 s/p resection of right hallux by Podiatry (1/17)      - Diet: diabetic diet  - Activity: OOB as tolerated, weight bearing as tolerated with surgical shoe on right heel  continue plavix 75 daily  continue pletal 50 bid  - Labs: f/u AM labs   - Pain medication: tylenol, oxycodone prn   - DVT ppx: [facility]  - f/u OR bone cultures     Vascular Surgery   [id number]

## 2022-01-18 NOTE — PHYSICAL THERAPY INITIAL EVALUATION ADULT - DID THE PATIENT HAVE SURGERY?
yes s/p RLE angio via L CFA access, stenting of mid and distal SFA as well as popliteal and PT trunk, with mynx closure (1/14/22). Pt s/p R foot resection of osteomyelitis hallux on 1/17/22/yes

## 2022-01-18 NOTE — PHYSICAL THERAPY INITIAL EVALUATION ADULT - PRECAUTIONS/LIMITATIONS, REHAB EVAL
Painless as pt has no sensation in toes due to diabetes. No fever but did have chills. No streaking up leg. Had previous bone removal in L 5th toe 2/2 OM. Otherwise feels well. No CP, SOB, cough, abd pain,fever, chills. Pt now presents s/p RLE angio via L CFA access, stenting of mid and distal SFA as well as popliteal and PT trunk, with mynx closure (1/14/22). Pt s/p R foot resection of osteomyelitis hallux on 1/17/22; WBAT through R heel in surgical shoe per MD orders./fall precautions

## 2022-01-18 NOTE — PROGRESS NOTE ADULT - SUBJECTIVE AND OBJECTIVE BOX
Podiatry pager #: [phone] ([facility])/ [phone] ([facility])    Patient is a 51y old  Male who presents with a chief complaint of Diabetic foot ulcer (18 Jan 2022 00:39)       INTERVAL HPI/OVERNIGHT EVENTS:  Patient seen and evaluated at bedside.  Pt is resting comfortable in NAD. Denies N/V/F/C.     Allergies    No Known Allergies    Intolerances        Vital Signs Last 24 Hrs  T(C): 36.8 (18 Jan 2022 09:22), Max: 37.6 (18 Jan 2022 01:14)  T(F): 98.3 (18 Jan 2022 09:22), Max: 99.6 (18 Jan 2022 01:14)  HR: 94 (18 Jan 2022 09:22) (86 - 106)  BP: 104/67 (18 Jan 2022 09:22) (102/54 - 144/72)  BP(mean): --  RR: 16 (18 Jan 2022 09:22) (16 - 18)  SpO2: 97% (18 Jan 2022 09:22) (95% - 99%)    LABS:                        9.4    7.06  )-----------( 155      ( 18 Jan 2022 07:58 )             30.3     01-18    138  |  96  |  61<H>  ----------------------------<  68<L>  4.0   |  22  |  11.31<H>    Ca    9.2      18 Jan 2022 07:58  Phos  7.9     01-18  Mg     2.5     01-18      PT/INR - ( 17 Jan 2022 06:53 )   PT: 13.7 sec;   INR: 1.15 ratio         PTT - ( 17 Jan 2022 06:53 )  PTT:36.0 sec    CAPILLARY BLOOD GLUCOSE      POCT Blood Glucose.: 92 mg/dL (18 Jan 2022 08:42)  POCT Blood Glucose.: 128 mg/dL (17 Jan 2022 22:38)  POCT Blood Glucose.: 97 mg/dL (17 Jan 2022 20:20)  POCT Blood Glucose.: 135 mg/dL (17 Jan 2022 12:08)      Lower Extremity Physical Exam:  Vascular: DP 0/4, PT 1/4 B/L, CFT <3 seconds B/L, Temperature gradient warm to cool B/L  Neuro: Epicritic sensation absent to the level of digits B/L  Musculoskeletal/Ortho: unremarkable   Skin:     s/p 1/17 R foot partial first ray resection closed, sutures intact, no acute dehiscence, no acute SOI, flaps warm and viable    RADIOLOGY & ADDITIONAL TESTS:

## 2022-01-18 NOTE — PROGRESS NOTE ADULT - ASSESSMENT
52yo M w/ h/o DM, CAD s/p CABG, h/o chronic systolic CHF but latest outptn EF improved to 50% here its 40% on this admission,  ESRD on HD, DIabetic neuropathy, doming in w diabetic foot ulcer with OM of right foot hallux wound to bone  - pt seen and evaluated by podiatry and vascular  -  ESR elevated   - Right foot hallux nailbed wound probing to bone, erythema to MPJ, dactylitis of hallux, no purulence, no malodor, no tracking, no fluctuance   - Right foot wound culture taken by podiatry. but not resulted in sunrise, blood Cx NTD. ID following, on Zosyn, s/p Vanco  - Pt seen by vascular as well:  Dr. Malone at bedside, s/p RLE angio via L CFA access, stenting of mid and distal SFA as well as popliteal and PT trunk, with mynx closure. placed on Pletal, Plavix, off ASA  - covid positive on 1/16  - POD1, post partial foot amp by podiatry   - HD as per renal  - CAD/ systolic CHF stable, card following, on BB, ACE-I, on pletal and Plavix, taken off ASA  - TTE reviewed EF 40%  - DM, on Insulin on a sliding scale, numbers are stable  - dvt ppx w [facility]  - cont outptn meds  - dc PLANNING

## 2022-01-18 NOTE — PHYSICAL THERAPY INITIAL EVALUATION ADULT - LIVES WITH, PROFILE
Pt resides with sister and elderly mother in apartment with 5-6 steps to enter, +10 steps inside to basement with handrail assist. Pt reports being functionally independent in all aspects of functional mobility and self care PTA without DME/AD.

## 2022-01-18 NOTE — PROGRESS NOTE ADULT - SUBJECTIVE AND OBJECTIVE BOX
DATE OF SERVICE: 01-18-22 @ 11:46    Subjective: Patient seen and examined. No new events except as noted.     SUBJECTIVE/ROS:  No chest pain, dyspnea, palpitation, or dizziness.   feels well       MEDICATIONS:  MEDICATIONS  (STANDING):  atorvastatin 40 milliGRAM(s) Oral at bedtime  carvedilol 6.25 milliGRAM(s) Oral every 12 hours  cilostazol 50 milliGRAM(s) Oral every 12 hours  clopidogrel Tablet 75 milliGRAM(s) Oral daily  dextrose 40% Gel 15 Gram(s) Oral once  dextrose 5%. 1000 milliLiter(s) (50 mL/Hr) IV Continuous <Continuous>  dextrose 5%. 1000 milliLiter(s) (100 mL/Hr) IV Continuous <Continuous>  dextrose 50% Injectable 25 Gram(s) IV Push once  dextrose 50% Injectable 12.5 Gram(s) IV Push once  dextrose 50% Injectable 25 Gram(s) IV Push once  glucagon  Injectable 1 milliGRAM(s) IntraMuscular once  heparin   Injectable 5000 Unit(s) SubCutaneous every 8 hours  insulin glargine Injectable (LANTUS) 10 Unit(s) SubCutaneous at bedtime  insulin lispro (ADMELOG) corrective regimen sliding scale   SubCutaneous three times a day before meals  insulin lispro (ADMELOG) corrective regimen sliding scale   SubCutaneous at bedtime  lisinopril 2.5 milliGRAM(s) Oral daily  midodrine. 5 milliGRAM(s) Oral <User Schedule>  Nephro-bridger 1 Tablet(s) Oral daily  piperacillin/tazobactam IVPB.. 3.375 Gram(s) IV Intermittent every 12 hours      PHYSICAL EXAM:  T(C): 36.8 (01-18-22 @ 09:22), Max: 37.6 (01-18-22 @ 01:14)  HR: 94 (01-18-22 @ 09:22) (86 - 106)  BP: 104/67 (01-18-22 @ 09:22) (102/54 - 144/72)  RR: 16 (01-18-22 @ 09:22) (16 - 18)  SpO2: 97% (01-18-22 @ 09:22) (95% - 99%)  Wt(kg): --  I&O's Summary    17 Jan 2022 07:01  -  18 Jan 2022 07:00  --------------------------------------------------------  IN: 720 mL / OUT: 300 mL / NET: 420 mL      Height (cm): 175.3 (01-17 @ 15:37)  Weight (kg): 90.7 (01-17 @ 15:37)  BMI (kg/m2): 29.5 (01-17 @ 15:37)  BSA (m2): 2.07 (01-17 @ 15:37)      JVP: Normal  Neck: supple  Lung: clear   CV: S1 S2 , Murmur:  Abd: soft  Ext: No edema  neuro: Awake / alert  Psych: flat affect      LABS/DATA:    CARDIAC MARKERS:  CARDIAC MARKERS ( 17 Jan 2022 00:06 )  x     / x     / 31 U/L / x     / 1.4 ng/mL                                9.4    7.06  )-----------( 155      ( 18 Jan 2022 07:58 )             30.3     01-18    138  |  96  |  61<H>  ----------------------------<  68<L>  4.0   |  22  |  11.31<H>    Ca    9.2      18 Jan 2022 07:58  Phos  7.9     01-18  Mg     2.5     01-18      proBNP:   Lipid Profile:   HgA1c:   TSH:     TELE:  EKG:

## 2022-01-18 NOTE — PROGRESS NOTE ADULT - ASSESSMENT
50yo M with PMH of DM, ESRD TTS presents to ED from Dr Myers podiatry office for eval of R 1st toe gangrene.   Started with ulcer 3 wks ago, initially placed on abx for last 10d which he completed. Today went to podiatry and found that ulcer was still open and when probed, went to bone. Painless as pt has no sensation in toes due to diabetes. No fever but did have chills. No streaking up leg. Had previous bone removal in L 5th toe 2/2 OM. Otherwise feels well. No CP, SOB, cough, abd pain,fever, chills (13 Jan 2022 20:10)  Pt at cardiac angiogram at time of bedside visit.     R ft OM:    - Pt afebrile.  Xray shows ulcerated tip of R hallux.  Pt seen by Podiatry, found to have PTB.  ESR 75, CRP 11.   No purulence/tracking/malodor/fluctuance reported.    - f/u wound cx.  Pt started on vanco x 1 and zosyn.  Check next vanco level on 1/16 AM and redose if <20    - Planned for partial hallux amputation.  For OR on 1/17     - Vascular following, s/p RLE angiogram. (1/14) s/p stenting of mid and distal SFA as well as popliteal and PT trunk      ESRD on dialysis:    - Renally dose meds.  Vanco dosed by level.    - Nephrology f/u appreciated.  On HD      Hypertension:    - Cont home BP meds with hold parameters    DM II:    - HgA1c, monitor FS, Cont insulin regimen    Covid (+):    - Repeat covid (+) on 1/16.  d/w Pharmacy yesterday, pt not eligible for Sotrovimab given pt under 65 yrs and has been fully vaccinated, despite h/o ESRD and DM.      - Currently not hypoxic.  No role to start remdesivir/dexamethasone.      * f/u OR cultures, will adjust abx according.  Currently on zosyn.  Surg cx ngtd.  f/u bone cx.  Podiatry f/u noted, low concern for residual infection/OM.  Hope to de-escalate to PO if bone cx neg.     Symone [facility]  [phone]

## 2022-01-18 NOTE — PHYSICAL THERAPY INITIAL EVALUATION ADULT - DIAGNOSIS, PT EVAL
Pt presents with mild impairments in balance and endurance impacting ability to perform ADLs and functional mobiltiy

## 2022-01-18 NOTE — PROGRESS NOTE ADULT - SUBJECTIVE AND OBJECTIVE BOX
STATUS POST: right foot resection of osteomyelitis hallux,  POST OPERATIVE DAY #: 0    Patient seen and examined at bedside. Pain controlled. denies dizziness, SOB, CP or palpitations    Vital Signs Last 24 Hrs  T(C): 37.6 (18 Jan 2022 01:14), Max: 37.6 (18 Jan 2022 01:14)  T(F): 99.6 (18 Jan 2022 01:14), Max: 99.6 (18 Jan 2022 01:14)  HR: 100 (18 Jan 2022 01:14) (86 - 104)  BP: 126/67 (18 Jan 2022 01:14) (86/52 - 144/72)  BP(mean): --  RR: 16 (18 Jan 2022 01:14) (16 - 18)  SpO2: 97% (18 Jan 2022 01:14) (95% - 99%)  I&O's Summary    16 Jan 2022 07:01  -  17 Jan 2022 07:00  --------------------------------------------------------  IN: 600 mL / OUT: 0 mL / NET: 600 mL    17 Jan 2022 07:01  -  18 Jan 2022 01:48  --------------------------------------------------------  IN: 600 mL / OUT: 300 mL / NET: 300 mL      I&O's Detail    16 Jan 2022 07:01  -  17 Jan 2022 07:00  --------------------------------------------------------  IN:    Oral Fluid: 600 mL  Total IN: 600 mL    OUT:    Voided (mL): 0 mL  Total OUT: 0 mL    Total NET: 600 mL      17 Jan 2022 07:01  -  18 Jan 2022 01:48  --------------------------------------------------------  IN:    Oral Fluid: 600 mL  Total IN: 600 mL    OUT:    Voided (mL): 300 mL  Total OUT: 300 mL    Total NET: 300 mL          MEDICATIONS  (STANDING):  atorvastatin 40 milliGRAM(s) Oral at bedtime  carvedilol 6.25 milliGRAM(s) Oral every 12 hours  cilostazol 50 milliGRAM(s) Oral every 12 hours  clopidogrel Tablet 75 milliGRAM(s) Oral daily  dextrose 40% Gel 15 Gram(s) Oral once  dextrose 5%. 1000 milliLiter(s) (50 mL/Hr) IV Continuous <Continuous>  dextrose 5%. 1000 milliLiter(s) (100 mL/Hr) IV Continuous <Continuous>  dextrose 50% Injectable 25 Gram(s) IV Push once  dextrose 50% Injectable 12.5 Gram(s) IV Push once  dextrose 50% Injectable 25 Gram(s) IV Push once  glucagon  Injectable 1 milliGRAM(s) IntraMuscular once  heparin   Injectable 5000 Unit(s) SubCutaneous every 8 hours  insulin glargine Injectable (LANTUS) 10 Unit(s) SubCutaneous at bedtime  insulin lispro (ADMELOG) corrective regimen sliding scale   SubCutaneous three times a day before meals  insulin lispro (ADMELOG) corrective regimen sliding scale   SubCutaneous at bedtime  lisinopril 2.5 milliGRAM(s) Oral daily  midodrine. 5 milliGRAM(s) Oral <User Schedule>  Nephro-bridger 1 Tablet(s) Oral daily  piperacillin/tazobactam IVPB.. 3.375 Gram(s) IV Intermittent every 12 hours    MEDICATIONS  (PRN):  HYDROmorphone  Injectable 2 milliGRAM(s) IV Push every 4 hours PRN Severe Pain (7 - 10)  oxycodone    5 mG/acetaminophen 325 mG 1 Tablet(s) Oral every 4 hours PRN Moderate Pain (4 - 6)      LABS:                        9.8    6.11  )-----------( 151      ( 17 Jan 2022 06:53 )             31.1     01-17    135  |  95<L>  |  48<H>  ----------------------------<  104<H>  4.1   |  22  |  8.87<H>    Ca    9.3      17 Jan 2022 06:53  Phos  7.0     01-17  Mg     2.4     01-17      PT/INR - ( 17 Jan 2022 06:53 )   PT: 13.7 sec;   INR: 1.15 ratio         PTT - ( 17 Jan 2022 06:53 )  PTT:36.0 sec      RADIOLOGY & ADDITIONAL STUDIES:    PHYSICAL EXAM:      Constitutional: NAD, A&O x 4  Cardiovascular: RRR  Gastrointestinal: abd soft, ND/NT  Extremities: right foot with dressing in place, no strikethrough bleeding               STATUS POST: right foot resection of osteomyelitis hallux,  POST OPERATIVE DAY #: 0    Patient seen and examined at bedside. Pain controlled. denies dizziness, SOB, CP or palpitations    Vital Signs Last 24 Hrs  T(C): 37.6 (18 Jan 2022 01:14), Max: 37.6 (18 Jan 2022 01:14)  T(F): 99.6 (18 Jan 2022 01:14), Max: 99.6 (18 Jan 2022 01:14)  HR: 100 (18 Jan 2022 01:14) (86 - 104)  BP: 126/67 (18 Jan 2022 01:14) (86/52 - 144/72)  BP(mean): --  RR: 16 (18 Jan 2022 01:14) (16 - 18)  SpO2: 97% (18 Jan 2022 01:14) (95% - 99%)  I&O's Summary    16 Jan 2022 07:01  -  17 Jan 2022 07:00  --------------------------------------------------------  IN: 600 mL / OUT: 0 mL / NET: 600 mL    17 Jan 2022 07:01  -  18 Jan 2022 01:48  --------------------------------------------------------  IN: 600 mL / OUT: 300 mL / NET: 300 mL      I&O's Detail    16 Jan 2022 07:01  -  17 Jan 2022 07:00  --------------------------------------------------------  IN:    Oral Fluid: 600 mL  Total IN: 600 mL    OUT:    Voided (mL): 0 mL  Total OUT: 0 mL    Total NET: 600 mL      17 Jan 2022 07:01  -  18 Jan 2022 01:48  --------------------------------------------------------  IN:    Oral Fluid: 600 mL  Total IN: 600 mL    OUT:    Voided (mL): 300 mL  Total OUT: 300 mL    Total NET: 300 mL          MEDICATIONS  (STANDING):  atorvastatin 40 milliGRAM(s) Oral at bedtime  carvedilol 6.25 milliGRAM(s) Oral every 12 hours  cilostazol 50 milliGRAM(s) Oral every 12 hours  clopidogrel Tablet 75 milliGRAM(s) Oral daily  dextrose 40% Gel 15 Gram(s) Oral once  dextrose 5%. 1000 milliLiter(s) (50 mL/Hr) IV Continuous <Continuous>  dextrose 5%. 1000 milliLiter(s) (100 mL/Hr) IV Continuous <Continuous>  dextrose 50% Injectable 25 Gram(s) IV Push once  dextrose 50% Injectable 12.5 Gram(s) IV Push once  dextrose 50% Injectable 25 Gram(s) IV Push once  glucagon  Injectable 1 milliGRAM(s) IntraMuscular once  heparin   Injectable 5000 Unit(s) SubCutaneous every 8 hours  insulin glargine Injectable (LANTUS) 10 Unit(s) SubCutaneous at bedtime  insulin lispro (ADMELOG) corrective regimen sliding scale   SubCutaneous three times a day before meals  insulin lispro (ADMELOG) corrective regimen sliding scale   SubCutaneous at bedtime  lisinopril 2.5 milliGRAM(s) Oral daily  midodrine. 5 milliGRAM(s) Oral <User Schedule>  Nephro-bridger 1 Tablet(s) Oral daily  piperacillin/tazobactam IVPB.. 3.375 Gram(s) IV Intermittent every 12 hours    MEDICATIONS  (PRN):  HYDROmorphone  Injectable 2 milliGRAM(s) IV Push every 4 hours PRN Severe Pain (7 - 10)  oxycodone    5 mG/acetaminophen 325 mG 1 Tablet(s) Oral every 4 hours PRN Moderate Pain (4 - 6)      LABS:                        9.8    6.11  )-----------( 151      ( 17 Jan 2022 06:53 )             31.1     01-17    135  |  95<L>  |  48<H>  ----------------------------<  104<H>  4.1   |  22  |  8.87<H>    Ca    9.3      17 Jan 2022 06:53  Phos  7.0     01-17  Mg     2.4     01-17      PT/INR - ( 17 Jan 2022 06:53 )   PT: 13.7 sec;   INR: 1.15 ratio         PTT - ( 17 Jan 2022 06:53 )  PTT:36.0 sec      RADIOLOGY & ADDITIONAL STUDIES:    PHYSICAL EXAM:      Constitutional: NAD, A&O x 4  Cardiovascular: RRR  Gastrointestinal: abd soft, ND/NT  Extremities: right foot with dressing in place, no strikethrough bleeding, left groin soft, no palpable hematoma                STATUS POST: right foot resection of osteomyelitis hallux,  POST OPERATIVE DAY #: 0    Patient seen and examined at bedside. Pain controlled. denies dizziness, SOB, CP or palpitations    Vital Signs Last 24 Hrs  T(C): 37.6 (18 Jan 2022 01:14), Max: 37.6 (18 Jan 2022 01:14)  T(F): 99.6 (18 Jan 2022 01:14), Max: 99.6 (18 Jan 2022 01:14)  HR: 100 (18 Jan 2022 01:14) (86 - 104)  BP: 126/67 (18 Jan 2022 01:14) (86/52 - 144/72)  BP(mean): --  RR: 16 (18 Jan 2022 01:14) (16 - 18)  SpO2: 97% (18 Jan 2022 01:14) (95% - 99%)  I&O's Summary    16 Jan 2022 07:01  -  17 Jan 2022 07:00  --------------------------------------------------------  IN: 600 mL / OUT: 0 mL / NET: 600 mL    17 Jan 2022 07:01  -  18 Jan 2022 01:48  --------------------------------------------------------  IN: 600 mL / OUT: 300 mL / NET: 300 mL      I&O's Detail    16 Jan 2022 07:01  -  17 Jan 2022 07:00  --------------------------------------------------------  IN:    Oral Fluid: 600 mL  Total IN: 600 mL    OUT:    Voided (mL): 0 mL  Total OUT: 0 mL    Total NET: 600 mL      17 Jan 2022 07:01  -  18 Jan 2022 01:48  --------------------------------------------------------  IN:    Oral Fluid: 600 mL  Total IN: 600 mL    OUT:    Voided (mL): 300 mL  Total OUT: 300 mL    Total NET: 300 mL      MEDICATIONS  (STANDING):  atorvastatin 40 milliGRAM(s) Oral at bedtime  carvedilol 6.25 milliGRAM(s) Oral every 12 hours  cilostazol 50 milliGRAM(s) Oral every 12 hours  clopidogrel Tablet 75 milliGRAM(s) Oral daily  dextrose 40% Gel 15 Gram(s) Oral once  dextrose 5%. 1000 milliLiter(s) (50 mL/Hr) IV Continuous <Continuous>  dextrose 5%. 1000 milliLiter(s) (100 mL/Hr) IV Continuous <Continuous>  dextrose 50% Injectable 25 Gram(s) IV Push once  dextrose 50% Injectable 12.5 Gram(s) IV Push once  dextrose 50% Injectable 25 Gram(s) IV Push once  glucagon  Injectable 1 milliGRAM(s) IntraMuscular once  heparin   Injectable 5000 Unit(s) SubCutaneous every 8 hours  insulin glargine Injectable (LANTUS) 10 Unit(s) SubCutaneous at bedtime  insulin lispro (ADMELOG) corrective regimen sliding scale   SubCutaneous three times a day before meals  insulin lispro (ADMELOG) corrective regimen sliding scale   SubCutaneous at bedtime  lisinopril 2.5 milliGRAM(s) Oral daily  midodrine. 5 milliGRAM(s) Oral <User Schedule>  Nephro-bridger 1 Tablet(s) Oral daily  piperacillin/tazobactam IVPB.. 3.375 Gram(s) IV Intermittent every 12 hours    MEDICATIONS  (PRN):  HYDROmorphone  Injectable 2 milliGRAM(s) IV Push every 4 hours PRN Severe Pain (7 - 10)  oxycodone    5 mG/acetaminophen 325 mG 1 Tablet(s) Oral every 4 hours PRN Moderate Pain (4 - 6)      LABS:                        9.8    6.11  )-----------( 151      ( 17 Jan 2022 06:53 )             31.1     01-17    135  |  95<L>  |  48<H>  ----------------------------<  104<H>  4.1   |  22  |  8.87<H>    Ca    9.3      17 Jan 2022 06:53  Phos  7.0     01-17  Mg     2.4     01-17      PT/INR - ( 17 Jan 2022 06:53 )   PT: 13.7 sec;   INR: 1.15 ratio         PTT - ( 17 Jan 2022 06:53 )  PTT:36.0 sec      RADIOLOGY & ADDITIONAL STUDIES:    PHYSICAL EXAM:      Constitutional: NAD, A&O x 4  Cardiovascular: RRR  Gastrointestinal: abd soft, ND/NT  Extremities: right foot with dressing in place, no strikethrough bleeding, left groin soft, no palpable hematoma  warm well perfused

## 2022-01-18 NOTE — PHYSICAL THERAPY INITIAL EVALUATION ADULT - IMPAIRED TRANSFERS: SIT/STAND, REHAB EVAL
Assessment    1  Sciatica of right side (724 3) (M54 31)   2  Abnormal neurological exam (781 99) (R29 90)    Plan  Abnormal neurological exam, Low back pain    · * MRI LUMBAR SPINE WO CONTRAST; Status:Need Information - Financial  Authorization; Requested for:[id number]; Abnormal neurological exam, Sciatica of right side    · Meloxicam 7 5 MG Oral Tablet; Take one after morning meal daily, may take one  after evening meal as needed  Sciatica of right side    · Follow Up After Tests Complete Evaluation and Treatment  Follow-up  Status: Hold For -  Scheduling  Requested for: 14LVR3511    Discussion/Summary  Impression: low back pain  Currently, the condition is unchanged  The diagnostic plan includes lumbar spine MRI  Medication changes are as documented in orders  Patient discussion: discussed with the patient  Chief Complaint  Sub acute LBP consult from PCP  History of Present Illness  55 yo female with non-traumatic onset of LBP about mid December afetr riding trip to [place]  Noticed next day   pattern is diffuse LBP, right buttock, and lateral leg to ankle not foot  Usually better with siting, worse with standing  Had tried muscle relaxant  Throbbing  No bowel or bladder incontinence, but increased pain with BM  Had prior episode in July 2016 and resolved with PTx  This is worse episode   missed some work  Some thoracic soreness and neck soreness with long procedures at work (dental assistant)  Had PT at [facility] AT THE [facility] INTO August 2016  Jesus Soliz  (last sih=gn off by Dr Delvis España 7/29)  Review of Systems    Gastrointestinal: GERD  Genitourinary: no complaints of dysuria, no incontinence  Psychiatric: No suicidal thoughts, no anxiety, no feelings of depression  Active Problems    1  Colonoscopy (Fiberoptic) Screening   2  Constipation (564 00) (K59 00)   3  Encounter for routine gynecological examination (V72 31) (Z01 419)   4  Encounter for screening colonoscopy (V76 51) (Z12 11)   5   Encounter for screening mammogram for malignant neoplasm of breast (V76 12)   (Z12 31)   6  Esophageal reflux (530 81) (K21 9)   7  Hyperlipidemia (272 4) (E78 5)   8  Internal hemorrhoids (455 0) (K64 8)   9  Low back pain (724 2) (M54 5)   10  Need for prophylactic vaccination and inoculation against influenza (V04 81) (Z23)   11  Never a smoker   12  Screening examination for pulmonary tuberculosis (V74 1) (Z11 1)   13  Strain of thoracic region (847 1) (S29 019A)   14  Vitamin D deficiency (268 9) (E55 9)    Past Medical History    1  History of Abdominal pain, RUQ (789 01) (R10 11)   2  History of Encounter for routine gynecological examination (V72 31) (Z01 419)   3  History of abdominal pain (V13 89) (Z87 898)   4  History of asthma (V12 69) (Z87 09)   5  History of constipation (V12 79) (Z87 19)   6  History of esophageal reflux (V12 79) (Z87 19)   7  History of neoplasm of uncertain behavior of skin (V13 3) (Z86 03)   8  Need for prophylactic vaccination and inoculation against influenza (V04 81) (Z23)   9  History of Well adult on routine health check (V70 0) (Z00 00)    The active problems and past medical history were reviewed and updated today  Surgical History    1  History of Complete Colonoscopy   2  History of Dental Surgery   3  History of Dilation And Curettage   4  History of Tonsillectomy   5  History of Tubal Ligation    The surgical history was reviewed and updated today  Family History  Mother    1  Family history of Heart Disease (V17 49)   2  Family history of Mother  At Age 62  Father    3  Family history of Heart Disease (V17 49)  Maternal Grandfather    4  Family history of Prostate Cancer (V16 42)  Paternal Grandfather    5  Family history of Gastric Cancer (V16 0)  Maternal Uncle    6  Family history of colon cancer (V16 0) (Z80 0)   7  Family history of malignant neoplasm of stomach (V16 0) (Z80 0)    The family history was reviewed and updated today         Social History    · Denied: History of Alcohol Use (History)   · Always uses seat belt   · Caffeine Use   · Denied: History of Drug Use   · Never a smoker  The social history was reviewed and is unchanged  Current Meds   1  Fiber CAPS; USE AS DIRECTED; Therapy: (Crow Kruse) to Recorded   2  Ibuprofen 200 MG Oral Tablet; Therapy: (Recorded:02Feb2017) to Recorded   3  Milk of Magnesia 400 MG/5ML Oral Suspension; 15 ml by mouth x 1 dose, may repeat in   4 hours if no BM; Therapy: 41FLU1596 to (Last Rx:21Oct2016) Ordered   4  Omeprazole 20 MG Oral Capsule Delayed Release; Take one capsule once daily before   eating  Requested for: 09FGF2982; Last Rx:[id number] Ordered   5  Orphenadrine Citrate  MG Oral Tablet Extended Release 12 Hour; TAKE 1   TABLET TWICE DAILY AS NEEDED; Therapy: 25Xqh2729 to (Evaluate:18Jan2017)  Requested for: 80HSZ2720; Last   Rx:[id number] Ordered   6  Polyethylene Glycol 3350 Oral Powder (MiraLax); MIX 17 GRAMS IN 8 OUNCES OF   WATER AND DRINK ONCE DAILY as neded for constipation; Therapy: 14VBF0154 to (Last Charmaine Gain)  Requested for: 21Oct2016 Ordered   7  Tylenol 500 MG CAPS; Therapy: (Recorded:02Feb2017) to Recorded   8  Ventolin  (90 Base) MCG/ACT Inhalation Aerosol Solution; INHALE 2 PUFFS   EVERY 4-6 HOURS AS NEEDED  Requested for: 23BRM0420; Last RJ:[id number]   Ordered   9  Zantac 150 MG Oral Tablet (RaNITidine HCl); TAKE 1 TABLET DAILY AS NEEDED; Therapy: (Recorded:20Jun2016) to Recorded    The medication list was reviewed and updated today  Allergies    1  No Known Drug Allergies    2  Other    Vitals  Signs   Recorded: 02Feb2017 03:46PM   Heart Rate: 68  Systolic: 759  Diastolic: 78  Height: 5 ft   Weight: 146 lb   BMI Calculated: 28 51  BSA Calculated: 1 63    Physical Exam  unable to reproduce pain with palpation over lumbarm SIJ, piriformis or ITB / GTB     Lumbosacral Spine: Special Tests: positive Straight Leg Raise and + root tension signs on the right , but negative Loki test    Neurologic - Reflexes: Normal  Deep tendon reflexes: 1+ right biceps, 1+ left biceps, 1+ right triceps, 1+ left triceps, 1+ right brachioradialis, 1+ left brachioradialis, 1+ right patella, 1+ left patella, 0 right ankle jerk, 1+ left ankle jerk and required facilitationno ankle clonus on the right and no ankle clonus on the left  Future Appointments    Date/Time Provider Specialty Site   06/23/2017 10:00 AM VISHAL Bentley [facility]     Signatures   Electronically signed by : Carri Danielle DO; Feb 2 2017  4:16PM EST                       (Author) impaired balance

## 2022-01-18 NOTE — PHYSICAL THERAPY INITIAL EVALUATION ADULT - GENERAL OBSERVATIONS, REHAB EVAL
Pt received sitting EOB in NAD, +IVF, +julius bandages R foot, VSS, agreeable to participate in therapy at this time

## 2022-01-18 NOTE — PROGRESS NOTE ADULT - SUBJECTIVE AND OBJECTIVE BOX
Patient is a 51y old  Male who presents with a chief complaint of Diabetic foot ulcer (18 Jan 2022 00:39)      SUBJECTIVE / OVERNIGHT EVENTS: POD1 post R foot 1st ray amputation    MEDICATIONS  (STANDING):  atorvastatin 40 milliGRAM(s) Oral at bedtime  carvedilol 6.25 milliGRAM(s) Oral every 12 hours  cilostazol 50 milliGRAM(s) Oral every 12 hours  clopidogrel Tablet 75 milliGRAM(s) Oral daily  dextrose 40% Gel 15 Gram(s) Oral once  dextrose 5%. 1000 milliLiter(s) (50 mL/Hr) IV Continuous <Continuous>  dextrose 5%. 1000 milliLiter(s) (100 mL/Hr) IV Continuous <Continuous>  dextrose 50% Injectable 25 Gram(s) IV Push once  dextrose 50% Injectable 12.5 Gram(s) IV Push once  dextrose 50% Injectable 25 Gram(s) IV Push once  glucagon  Injectable 1 milliGRAM(s) IntraMuscular once  heparin   Injectable 5000 Unit(s) SubCutaneous every 8 hours  insulin glargine Injectable (LANTUS) 10 Unit(s) SubCutaneous at bedtime  insulin lispro (ADMELOG) corrective regimen sliding scale   SubCutaneous three times a day before meals  insulin lispro (ADMELOG) corrective regimen sliding scale   SubCutaneous at bedtime  lisinopril 2.5 milliGRAM(s) Oral daily  midodrine. 5 milliGRAM(s) Oral <User Schedule>  Nephro-bridger 1 Tablet(s) Oral daily  piperacillin/tazobactam IVPB.. 3.375 Gram(s) IV Intermittent every 12 hours    MEDICATIONS  (PRN):  HYDROmorphone  Injectable 2 milliGRAM(s) IV Push every 4 hours PRN Severe Pain (7 - 10)  oxycodone    5 mG/acetaminophen 325 mG 1 Tablet(s) Oral every 4 hours PRN Moderate Pain (4 - 6)      Vital Signs Last 24 Hrs  T(F): 98.6 (01-18-22 @ 16:39), Max: 99.6 (01-18-22 @ 01:14)  HR: 101 (01-18-22 @ 16:39) (86 - 106)  BP: 95/54 (01-18-22 @ 16:39) (95/54 - 144/72)  RR: 16 (01-18-22 @ 16:39) (16 - 18)  SpO2: 98% (01-18-22 @ 16:39) (95% - 99%)  Telemetry:   CAPILLARY BLOOD GLUCOSE      POCT Blood Glucose.: 82 mg/dL (18 Jan 2022 16:36)  POCT Blood Glucose.: 92 mg/dL (18 Jan 2022 08:42)  POCT Blood Glucose.: 128 mg/dL (17 Jan 2022 22:38)  POCT Blood Glucose.: 97 mg/dL (17 Jan 2022 20:20)    I&O's Summary    17 Jan 2022 07:01  -  18 Jan 2022 07:00  --------------------------------------------------------  IN: 720 mL / OUT: 300 mL / NET: 420 mL    18 Jan 2022 07:01  -  18 Jan 2022 18:46  --------------------------------------------------------  IN: 0 mL / OUT: 3000 mL / NET: -3000 mL        PHYSICAL EXAM:  GENERAL: NAD, well-developed  HEAD:  Atraumatic, Normocephalic  EYES: EOMI, PERRLA, conjunctiva and sclera clear  NECK: Supple, No JVD  CHEST/LUNG: Clear to auscultation bilaterally; No wheeze  HEART: Regular rate and rhythm; No murmurs, rubs, or gallops  ABDOMEN: Soft, Nontender, Nondistended; Bowel sounds present  EXTREMITIES:  2+ Peripheral Pulses, No clubbing, cyanosis, or edema  PSYCH: AAOx3  NEUROLOGY: non-focal  SKIN: No rashes or lesions    LABS:                        9.4    7.06  )-----------( 155      ( 18 Jan 2022 07:58 )             30.3     01-18    138  |  96  |  61<H>  ----------------------------<  68<L>  4.0   |  22  |  11.31<H>    Ca    9.2      18 Jan 2022 07:58  Phos  7.9     01-18  Mg     2.5     01-18      PT/INR - ( 17 Jan 2022 06:53 )   PT: 13.7 sec;   INR: 1.15 ratio         PTT - ( 17 Jan 2022 06:53 )  PTT:36.0 sec  CARDIAC MARKERS ( 17 Jan 2022 00:06 )  x     / x     / 31 U/L / x     / 1.4 ng/mL          RADIOLOGY & ADDITIONAL TESTS:    Imaging Personally Reviewed:    Consultant(s) Notes Reviewed:      Care Discussed with Consultants/Other Providers:

## 2022-01-18 NOTE — PROGRESS NOTE ADULT - PROBLEM SELECTOR PLAN 2
I Leonardo Malone MD have seen and examined the patient today and agree with  the  evaluation, assessment and plan of the surgical house officer  VARSHA Malone MD have personally seen and examined the patient at bedside today at  8am

## 2022-01-18 NOTE — PHYSICAL THERAPY INITIAL EVALUATION ADULT - ADDITIONAL COMMENTS
X-Ray R Foot 1/17/22: Status post hallux amputation through MTP joint level with post surgical changes in the overlying soft tissues.  Chest X-Ray 1/16/22 Impression: Status post CABG. Status post defibrillator. Cardiomegaly.   Small left pleural effusion.

## 2022-01-18 NOTE — PROGRESS NOTE ADULT - ASSESSMENT
51 M s/p R foot partial 1st ray resection closed  - pt seen and evaluated   - afebrile, no leukocytosis  - s/p 1/17 R foot partial first ray resection closed, sutures intact, no acute dehiscence, no acute SOI, flaps warm and viable  - low concern for residual infection/viability  - podiatry stable for d/c pending two days of clean bone margin   - follow up information & instructions in the fu section of the provider d/c note  - seen w/ attending    51 M s/p R foot partial 1st ray resection closed  - pt seen and evaluated   - afebrile, no leukocytosis  - s/p 1/17 R foot partial first ray resection closed, sutures intact, no acute dehiscence, no acute SOI, flaps warm and viable  - low concern for residual infection/viability  - podiatry stable for d/c pending two days of clean bone margin   - follow up information & instructions in the f/u section of the provider d/c note  - seen w/ attending

## 2022-01-18 NOTE — PHYSICAL THERAPY INITIAL EVALUATION ADULT - PLANNED THERAPY INTERVENTIONS, PT EVAL
STAIR GOAL: Pt will negotiate 1 FOS independently with HR assist as needed within 2 weeks./balance training/gait training

## 2022-01-18 NOTE — PROGRESS NOTE ADULT - SUBJECTIVE AND OBJECTIVE BOX
[facility] DIVISION OF KIDNEY DISEASES AND HYPERTENSION -- FOLLOW UP NOTE  --------------------------------------------------------------------------------  Chief Complaint: ESRD on HD    24 hour events/subjective:  seen and examined this morning  reports feeling okay but did complain of mild discomfort in his right foot   pain well controlled   no acute events noted overnight      PAST HISTORY  --------------------------------------------------------------------------------  No significant changes to PMH, PSH, FHx, SHx, unless otherwise noted    ALLERGIES & MEDICATIONS  --------------------------------------------------------------------------------  Allergies    No Known Allergies    Intolerances      Standing Inpatient Medications  atorvastatin 40 milliGRAM(s) Oral at bedtime  carvedilol 6.25 milliGRAM(s) Oral every 12 hours  cilostazol 50 milliGRAM(s) Oral every 12 hours  clopidogrel Tablet 75 milliGRAM(s) Oral daily  dextrose 40% Gel 15 Gram(s) Oral once  dextrose 5%. 1000 milliLiter(s) IV Continuous <Continuous>  dextrose 5%. 1000 milliLiter(s) IV Continuous <Continuous>  dextrose 50% Injectable 25 Gram(s) IV Push once  dextrose 50% Injectable 12.5 Gram(s) IV Push once  dextrose 50% Injectable 25 Gram(s) IV Push once  glucagon  Injectable 1 milliGRAM(s) IntraMuscular once  heparin   Injectable 5000 Unit(s) SubCutaneous every 8 hours  insulin glargine Injectable (LANTUS) 10 Unit(s) SubCutaneous at bedtime  insulin lispro (ADMELOG) corrective regimen sliding scale   SubCutaneous three times a day before meals  insulin lispro (ADMELOG) corrective regimen sliding scale   SubCutaneous at bedtime  lisinopril 2.5 milliGRAM(s) Oral daily  midodrine. 5 milliGRAM(s) Oral <User Schedule>  Nephro-bridger 1 Tablet(s) Oral daily  piperacillin/tazobactam IVPB.. 3.375 Gram(s) IV Intermittent every 12 hours    PRN Inpatient Medications  HYDROmorphone  Injectable 2 milliGRAM(s) IV Push every 4 hours PRN  oxycodone    5 mG/acetaminophen 325 mG 1 Tablet(s) Oral every 4 hours PRN      REVIEW OF SYSTEMS      All other systems were reviewed and are negative, except as noted.    VITALS/PHYSICAL EXAM  --------------------------------------------------------------------------------  T(C): 36.8 (01-18-22 @ 09:22), Max: 37.6 (01-18-22 @ 01:14)  HR: 94 (01-18-22 @ 09:22) (86 - 106)  BP: 104/67 (01-18-22 @ 09:22) (102/54 - 144/72)  RR: 16 (01-18-22 @ 09:22) (16 - 18)  SpO2: 97% (01-18-22 @ 09:22) (95% - 99%)  Wt(kg): --  Height (cm): 175.3 (01-17-22 @ 15:37)  Weight (kg): 90.7 (01-17-22 @ 15:37)  BMI (kg/m2): 29.5 (01-17-22 @ 15:37)  BSA (m2): 2.07 (01-17-22 @ 15:37)      01-17-22 @ 07:01  -  01-18-22 @ 07:00  --------------------------------------------------------  IN: 720 mL / OUT: 300 mL / NET: 420 mL        Physical Exam:  	Gen: NAD  	HEENT: MMM  	Pulm: CTA B/L, no crackles   	CV: S1S2  	Abd: Soft, +BS   	Ext: No LE edema B/L, r foot dressing in place  	Neuro: Awake  	Skin: Warm and dry  	Vascular access: LUE AVF with good bruit and palpable thrills     LABS/STUDIES  --------------------------------------------------------------------------------              9.4    7.06  >-----------<  155      [01-18-22 @ 07:58]              30.3     138  |  96  |  61  ----------------------------<  68      [01-18-22 @ 07:58]  4.0   |  22  |  11.31        Ca     9.2     [01-18-22 @ 07:58]      Mg     2.5     [01-18-22 @ 07:58]      Phos  7.9     [01-18-22 @ 07:58]      PT/INR: PT 13.7 , INR 1.15       [01-17-22 @ 06:53]  PTT: 36.0       [01-17-22 @ 06:53]    CK 31      [01-17-22 @ 00:06]    Creatinine Trend:  SCr 11.31 [01-18 @ 07:58]  SCr 8.87 [01-17 @ 06:53]  SCr 8.36 [01-17 @ 00:06]  SCr 6.11 [01-16 @ 07:17]  SCr 7.63 [01-15 @ 07:02]        HbA1c 6.4      [04-27-17 @ 07:23]    HBsAg Nonreact      [01-16-22 @ 01:31]  HCV 0.25, Nonreact      [01-16-22 @ 01:31]

## 2022-01-19 ENCOUNTER — TRANSCRIPTION ENCOUNTER (OUTPATIENT)
Age: 52
End: 2022-01-19

## 2022-01-19 VITALS
DIASTOLIC BLOOD PRESSURE: 63 MMHG | OXYGEN SATURATION: 98 % | RESPIRATION RATE: 18 BRPM | TEMPERATURE: 99 F | HEART RATE: 100 BPM | SYSTOLIC BLOOD PRESSURE: 98 MMHG

## 2022-01-19 LAB
ANION GAP SERPL CALC-SCNC: 17 MMOL/L — SIGNIFICANT CHANGE UP (ref 5–17)
BUN SERPL-MCNC: 39 MG/DL — HIGH (ref 7–23)
CALCIUM SERPL-MCNC: 9.6 MG/DL — SIGNIFICANT CHANGE UP (ref 8.4–10.5)
CHLORIDE SERPL-SCNC: 96 MMOL/L — SIGNIFICANT CHANGE UP (ref 96–108)
CO2 SERPL-SCNC: 25 MMOL/L — SIGNIFICANT CHANGE UP (ref 22–31)
CREAT SERPL-MCNC: 8.13 MG/DL — HIGH (ref 0.5–1.3)
GLUCOSE BLDC GLUCOMTR-MCNC: 130 MG/DL — HIGH (ref 70–99)
GLUCOSE BLDC GLUCOMTR-MCNC: 178 MG/DL — HIGH (ref 70–99)
GLUCOSE SERPL-MCNC: 118 MG/DL — HIGH (ref 70–99)
HCT VFR BLD CALC: 30.6 % — LOW (ref 39–50)
HGB BLD-MCNC: 9.5 G/DL — LOW (ref 13–17)
MAGNESIUM SERPL-MCNC: 2.3 MG/DL — SIGNIFICANT CHANGE UP (ref 1.6–2.6)
MCHC RBC-ENTMCNC: 29.3 PG — SIGNIFICANT CHANGE UP (ref 27–34)
MCHC RBC-ENTMCNC: 31 GM/DL — LOW (ref 32–36)
MCV RBC AUTO: 94.4 FL — SIGNIFICANT CHANGE UP (ref 80–100)
NRBC # BLD: 0 /100 WBCS — SIGNIFICANT CHANGE UP (ref 0–0)
PHOSPHATE SERPL-MCNC: 5.8 MG/DL — HIGH (ref 2.5–4.5)
PLATELET # BLD AUTO: 165 K/UL — SIGNIFICANT CHANGE UP (ref 150–400)
POTASSIUM SERPL-MCNC: 4.1 MMOL/L — SIGNIFICANT CHANGE UP (ref 3.5–5.3)
POTASSIUM SERPL-SCNC: 4.1 MMOL/L — SIGNIFICANT CHANGE UP (ref 3.5–5.3)
RBC # BLD: 3.24 M/UL — LOW (ref 4.2–5.8)
RBC # FLD: 17.2 % — HIGH (ref 10.3–14.5)
SARS-COV-2 RNA SPEC QL NAA+PROBE: DETECTED
SODIUM SERPL-SCNC: 138 MMOL/L — SIGNIFICANT CHANGE UP (ref 135–145)
WBC # BLD: 5.9 K/UL — SIGNIFICANT CHANGE UP (ref 3.8–10.5)
WBC # FLD AUTO: 5.9 K/UL — SIGNIFICANT CHANGE UP (ref 3.8–10.5)

## 2022-01-19 PROCEDURE — 99232 SBSQ HOSP IP/OBS MODERATE 35: CPT

## 2022-01-19 PROCEDURE — 80202 ASSAY OF VANCOMYCIN: CPT

## 2022-01-19 PROCEDURE — 83735 ASSAY OF MAGNESIUM: CPT

## 2022-01-19 PROCEDURE — 88304 TISSUE EXAM BY PATHOLOGIST: CPT

## 2022-01-19 PROCEDURE — 86901 BLOOD TYPING SEROLOGIC RH(D): CPT

## 2022-01-19 PROCEDURE — 84295 ASSAY OF SERUM SODIUM: CPT

## 2022-01-19 PROCEDURE — 36415 COLL VENOUS BLD VENIPUNCTURE: CPT

## 2022-01-19 PROCEDURE — 73630 X-RAY EXAM OF FOOT: CPT

## 2022-01-19 PROCEDURE — 82553 CREATINE MB FRACTION: CPT

## 2022-01-19 PROCEDURE — 93005 ELECTROCARDIOGRAM TRACING: CPT

## 2022-01-19 PROCEDURE — 86850 RBC ANTIBODY SCREEN: CPT

## 2022-01-19 PROCEDURE — 85025 COMPLETE CBC W/AUTO DIFF WBC: CPT

## 2022-01-19 PROCEDURE — 82962 GLUCOSE BLOOD TEST: CPT

## 2022-01-19 PROCEDURE — 84484 ASSAY OF TROPONIN QUANT: CPT

## 2022-01-19 PROCEDURE — 84132 ASSAY OF SERUM POTASSIUM: CPT

## 2022-01-19 PROCEDURE — 87040 BLOOD CULTURE FOR BACTERIA: CPT

## 2022-01-19 PROCEDURE — 80048 BASIC METABOLIC PNL TOTAL CA: CPT

## 2022-01-19 PROCEDURE — 86900 BLOOD TYPING SEROLOGIC ABO: CPT

## 2022-01-19 PROCEDURE — 85730 THROMBOPLASTIN TIME PARTIAL: CPT

## 2022-01-19 PROCEDURE — 86140 C-REACTIVE PROTEIN: CPT

## 2022-01-19 PROCEDURE — C1769: CPT

## 2022-01-19 PROCEDURE — 75710 ARTERY X-RAYS ARM/LEG: CPT | Mod: 59

## 2022-01-19 PROCEDURE — 85610 PROTHROMBIN TIME: CPT

## 2022-01-19 PROCEDURE — 99261: CPT

## 2022-01-19 PROCEDURE — 85027 COMPLETE CBC AUTOMATED: CPT

## 2022-01-19 PROCEDURE — 85018 HEMOGLOBIN: CPT

## 2022-01-19 PROCEDURE — 85014 HEMATOCRIT: CPT

## 2022-01-19 PROCEDURE — 82435 ASSAY OF BLOOD CHLORIDE: CPT

## 2022-01-19 PROCEDURE — 97161 PT EVAL LOW COMPLEX 20 MIN: CPT

## 2022-01-19 PROCEDURE — C1887: CPT

## 2022-01-19 PROCEDURE — 87075 CULTR BACTERIA EXCEPT BLOOD: CPT

## 2022-01-19 PROCEDURE — U0003: CPT

## 2022-01-19 PROCEDURE — C1876: CPT

## 2022-01-19 PROCEDURE — C8929: CPT

## 2022-01-19 PROCEDURE — 80053 COMPREHEN METABOLIC PANEL: CPT

## 2022-01-19 PROCEDURE — 85652 RBC SED RATE AUTOMATED: CPT

## 2022-01-19 PROCEDURE — C1894: CPT

## 2022-01-19 PROCEDURE — 82565 ASSAY OF CREATININE: CPT

## 2022-01-19 PROCEDURE — 87070 CULTURE OTHR SPECIMN AEROBIC: CPT

## 2022-01-19 PROCEDURE — 88311 DECALCIFY TISSUE: CPT

## 2022-01-19 PROCEDURE — 99153 MOD SED SAME PHYS/QHP EA: CPT

## 2022-01-19 PROCEDURE — 71045 X-RAY EXAM CHEST 1 VIEW: CPT

## 2022-01-19 PROCEDURE — 83605 ASSAY OF LACTIC ACID: CPT

## 2022-01-19 PROCEDURE — 82330 ASSAY OF CALCIUM: CPT

## 2022-01-19 PROCEDURE — C1760: CPT

## 2022-01-19 PROCEDURE — 80074 ACUTE HEPATITIS PANEL: CPT

## 2022-01-19 PROCEDURE — 82550 ASSAY OF CK (CPK): CPT

## 2022-01-19 PROCEDURE — 84100 ASSAY OF PHOSPHORUS: CPT

## 2022-01-19 PROCEDURE — 37230: CPT

## 2022-01-19 PROCEDURE — 82947 ASSAY GLUCOSE BLOOD QUANT: CPT

## 2022-01-19 PROCEDURE — 37226: CPT

## 2022-01-19 PROCEDURE — U0005: CPT

## 2022-01-19 PROCEDURE — 99152 MOD SED SAME PHYS/QHP 5/>YRS: CPT

## 2022-01-19 PROCEDURE — 83036 HEMOGLOBIN GLYCOSYLATED A1C: CPT

## 2022-01-19 PROCEDURE — 99285 EMERGENCY DEPT VISIT HI MDM: CPT | Mod: 25

## 2022-01-19 PROCEDURE — 82803 BLOOD GASES ANY COMBINATION: CPT

## 2022-01-19 RX ORDER — ATORVASTATIN CALCIUM 80 MG/1
1 TABLET, FILM COATED ORAL
Qty: 0 | Refills: 0 | DISCHARGE
Start: 2022-01-19

## 2022-01-19 RX ORDER — MIDODRINE HYDROCHLORIDE 2.5 MG/1
1 TABLET ORAL
Qty: 0 | Refills: 0 | DISCHARGE
Start: 2022-01-19

## 2022-01-19 RX ORDER — CARVEDILOL PHOSPHATE 80 MG/1
1 CAPSULE, EXTENDED RELEASE ORAL
Qty: 0 | Refills: 0 | DISCHARGE

## 2022-01-19 RX ORDER — CILOSTAZOL 100 MG/1
1 TABLET ORAL
Qty: 60 | Refills: 0
Start: 2022-01-19 | End: 2022-02-17

## 2022-01-19 RX ORDER — LISINOPRIL 2.5 MG/1
1 TABLET ORAL
Qty: 0 | Refills: 0 | DISCHARGE

## 2022-01-19 RX ORDER — MIDODRINE HYDROCHLORIDE 2.5 MG/1
1 TABLET ORAL
Qty: 0 | Refills: 0 | DISCHARGE

## 2022-01-19 RX ADMIN — HEPARIN SODIUM 5000 UNIT(S): 5000 INJECTION INTRAVENOUS; SUBCUTANEOUS at 06:05

## 2022-01-19 RX ADMIN — Medication 1: at 13:24

## 2022-01-19 RX ADMIN — HEPARIN SODIUM 5000 UNIT(S): 5000 INJECTION INTRAVENOUS; SUBCUTANEOUS at 13:28

## 2022-01-19 RX ADMIN — CILOSTAZOL 50 MILLIGRAM(S): 100 TABLET ORAL at 06:05

## 2022-01-19 RX ADMIN — LISINOPRIL 2.5 MILLIGRAM(S): 2.5 TABLET ORAL at 06:05

## 2022-01-19 RX ADMIN — PIPERACILLIN AND TAZOBACTAM 25 GRAM(S): 4; .5 INJECTION, POWDER, LYOPHILIZED, FOR SOLUTION INTRAVENOUS at 06:06

## 2022-01-19 RX ADMIN — Medication 1 TABLET(S): at 11:25

## 2022-01-19 RX ADMIN — CARVEDILOL PHOSPHATE 6.25 MILLIGRAM(S): 80 CAPSULE, EXTENDED RELEASE ORAL at 06:05

## 2022-01-19 RX ADMIN — CLOPIDOGREL BISULFATE 75 MILLIGRAM(S): 75 TABLET, FILM COATED ORAL at 11:25

## 2022-01-19 NOTE — DISCHARGE NOTE NURSING/CASE MANAGEMENT/SOCIAL WORK - PATIENT PORTAL LINK FT
You can access the FollowMyHealth Patient Portal offered by [facility] by registering at the following website: [email or web address]. By joining [facility]’s FollowMyHealth portal, you will also be able to view your health information using other applications (apps) compatible with our system.

## 2022-01-19 NOTE — PROGRESS NOTE ADULT - PROVIDER SPECIALTY LIST ADULT
Internal Medicine
Internal Medicine
Vascular Surgery
Cardiology
Infectious Disease
Internal Medicine
Internal Medicine
Podiatry
Vascular Surgery
Vascular Surgery
Infectious Disease
Internal Medicine
Internal Medicine
Podiatry
Vascular Surgery
Nephrology

## 2022-01-19 NOTE — PROGRESS NOTE ADULT - SUBJECTIVE AND OBJECTIVE BOX
DATE OF SERVICE: 01-19-22 @ 10:52    Subjective: Patient seen and examined. No new events except as noted.     SUBJECTIVE/ROS:  feels ok   No chest pain, dyspnea, palpitation, or dizziness.       MEDICATIONS:  MEDICATIONS  (STANDING):  atorvastatin 40 milliGRAM(s) Oral at bedtime  carvedilol 6.25 milliGRAM(s) Oral every 12 hours  cilostazol 50 milliGRAM(s) Oral every 12 hours  clopidogrel Tablet 75 milliGRAM(s) Oral daily  dextrose 40% Gel 15 Gram(s) Oral once  dextrose 5%. 1000 milliLiter(s) (50 mL/Hr) IV Continuous <Continuous>  dextrose 5%. 1000 milliLiter(s) (100 mL/Hr) IV Continuous <Continuous>  dextrose 50% Injectable 25 Gram(s) IV Push once  dextrose 50% Injectable 12.5 Gram(s) IV Push once  dextrose 50% Injectable 25 Gram(s) IV Push once  glucagon  Injectable 1 milliGRAM(s) IntraMuscular once  heparin   Injectable 5000 Unit(s) SubCutaneous every 8 hours  insulin glargine Injectable (LANTUS) 10 Unit(s) SubCutaneous at bedtime  insulin lispro (ADMELOG) corrective regimen sliding scale   SubCutaneous three times a day before meals  insulin lispro (ADMELOG) corrective regimen sliding scale   SubCutaneous at bedtime  lisinopril 2.5 milliGRAM(s) Oral daily  midodrine. 5 milliGRAM(s) Oral <User Schedule>  Nephro-bridger 1 Tablet(s) Oral daily  piperacillin/tazobactam IVPB.. 3.375 Gram(s) IV Intermittent every 12 hours      PHYSICAL EXAM:  T(C): 36.8 (01-19-22 @ 10:03), Max: 37.2 (01-18-22 @ 21:48)  HR: 95 (01-19-22 @ 10:03) (92 - 101)  BP: 97/61 (01-19-22 @ 10:03) (93/58 - 126/69)  RR: 18 (01-19-22 @ 10:03) (16 - 18)  SpO2: 97% (01-19-22 @ 10:03) (94% - 98%)  Wt(kg): --  I&O's Summary    18 Jan 2022 07:01  -  19 Jan 2022 07:00  --------------------------------------------------------  IN: 480 mL / OUT: 3000 mL / NET: -2520 mL    19 Jan 2022 07:01  -  19 Jan 2022 10:52  --------------------------------------------------------  IN: 320 mL / OUT: 0 mL / NET: 320 mL            JVP: Normal  Neck: supple  Lung: clear   CV: S1 S2 , Murmur:  Abd: soft  Ext: No edema  neuro: Awake / alert  Psych: flat affect  Skin: normal``    LABS/DATA:    CARDIAC MARKERS:  CARDIAC MARKERS ( 17 Jan 2022 00:06 )  x     / x     / 31 U/L / x     / 1.4 ng/mL                                9.5    5.90  )-----------( 165      ( 19 Jan 2022 07:16 )             30.6     01-19    138  |  96  |  39<H>  ----------------------------<  118<H>  4.1   |  25  |  8.13<H>    Ca    9.6      19 Jan 2022 07:16  Phos  5.8     01-19  Mg     2.3     01-19      proBNP:   Lipid Profile:   HgA1c:   TSH:     TELE:  EKG:

## 2022-01-19 NOTE — PROGRESS NOTE ADULT - ASSESSMENT
51M with PMH of DM, ESRD TTS (s/p L AVF by Dr. Malone in 2017) presents with right foot hallux wound to bone, s/p RLE angio via L CFA access, stenting of mid and distal SFA as well as popliteal and PT trunk, with mynx closure (1/14/22). POD #1 s/p resection of right hallux by Podiatry (1/17).     - Diet: diabetic diet  - Activity: OOB as tolerated, weight bearing as tolerated with surgical shoe on right heel  - continue plavix 75 daily  - continue pletal 50 bid  - Labs: f/u AM labs   - Pain medication: tylenol, oxycodone prn   - DVT ppx: SQH  - f/u OR bone cultures   - Patient clear for d/c from Podiatry standpoint with 10 days of PO Augmentin 875mg and OP follow up.     Vascular Surgery   [id number]

## 2022-01-19 NOTE — PROGRESS NOTE ADULT - ASSESSMENT
51 M s/p R foot partial 1st ray resection closed  - pt seen and evaluated   - afebrile, no leukocytosis  - s/p 1/17 R foot partial first ray resection closed, sutures intact, no acute dehiscence, no acute SOI, flaps warm and viable  - low concern for residual infection/viability  - preliminary clean bone culture margin currently no growth  - podiatry stable for d/c, rec d/c on 10 days of PO Augmentin 875mg  - stable for discharge from podiatry standpoint  - follow up information & instructions in the f/u section of the provider d/c note  - discussed w/ attending  51 M s/p R foot partial 1st ray resection closed  - pt seen and evaluated   - afebrile, no leukocytosis  - s/p 1/17 R foot partial first ray resection closed, sutures intact, no acute dehiscence, no acute SOI, flaps warm and viable  - low concern for residual infection/viability  - preliminary clean bone culture margin currently no growth  - podiatry stable for d/c, rec d/c on 10 days of PO Augmentin 500 mg once a day  - stable for discharge from podiatry standpoint  - follow up information & instructions in the f/u section of the provider d/c note  - discussed w/ attending

## 2022-01-19 NOTE — PROGRESS NOTE ADULT - ATTENDING COMMENTS
I Leonardo Malone MD have seen and examined the patient today and agree with  the  evaluation, assessment and plan of the surgical house officer  VARSHA Malone MD have personally seen and examined the patient at bedside today at  8am
I Leonardo Malone MD agree with  the  evaluation, assessment and plan of the surgical house officer
I have seen this patient with the fellow and agree with their assessment and plan. I was physically present for significant portions of the evaluation and management (E/M) service provided.  I agree with the above history, physical, and plan which I have reviewed and edited where appropriate.    Plan for HD today  Overall stable  Asymptomatic BRITANY Hyde MD  Cell   Pager   Office   (After 5 pm or on weekends please page the on-call fellow/attending, can check [email or web address] for schedule. Login is lacey [email or web address], schedule under [facility] medicine, psych, derm)

## 2022-01-19 NOTE — PROGRESS NOTE ADULT - SUBJECTIVE AND OBJECTIVE BOX
Vascular Surgery Progress Note      STATUS POST:   1/14/22: RLE angio via L CFA access, stenting of mid and distal SFA as well as popliteal and PT trunk, with mynx closure  1/17/22: R foot partial first ray resection with Podiatry.     Patient seen and examined at bedside. Pain controlled. denies dizziness, SOB, CP or palpitations.     Vital Signs Last 24 Hrs  T(C): 36.7 (19 Jan 2022 06:18), Max: 37.2 (18 Jan 2022 21:48)  T(F): 98 (19 Jan 2022 06:18), Max: 99 (18 Jan 2022 21:48)  HR: 98 (19 Jan 2022 06:18) (92 - 106)  BP: 108/68 (19 Jan 2022 06:18) (93/58 - 126/69)  RR: 18 (19 Jan 2022 06:18) (16 - 18)  SpO2: 96% (19 Jan 2022 06:18) (94% - 98%)      I&O's Detail    18 Jan 2022 07:01  -  19 Jan 2022 07:00  --------------------------------------------------------  IN:    Oral Fluid: 480 mL  Total IN: 480 mL    OUT:    Other (mL): 3000 mL  Total OUT: 3000 mL    Total NET: -2520 mL                              9.5    5.90  )-----------( 165      ( 19 Jan 2022 07:16 )             30.6       01-19    138  |  96  |  39<H>  ----------------------------<  118<H>  4.1   |  25  |  8.13<H>    Ca    9.6      19 Jan 2022 07:16  Phos  5.8     01-19  Mg     2.3     01-19          CAPILLARY BLOOD GLUCOSE  POCT Blood Glucose.: 130 mg/dL (19 Jan 2022 08:44)        PHYSICAL EXAM:  Constitutional: NAD, A&O x 4  Cardiovascular: RRR  Gastrointestinal: abd soft, ND/NT  Extremities: right foot with dressing in place, no strikethrough bleeding, left groin soft, no palpable hematoma, warm well perfused.                Vascular Surgery Progress Note      STATUS POST:   1/14/22: RLE angio via L CFA access, stenting of mid and distal SFA as well as popliteal and PT trunk, with mynx closure  1/17/22: R foot partial first ray resection with Podiatry.     Patient seen and examined at bedside. Pain controlled. denies dizziness, SOB, CP or palpitations.     Vital Signs Last 24 Hrs  T(C): 36.7 (19 Jan 2022 06:18), Max: 37.2 (18 Jan 2022 21:48)  T(F): 98 (19 Jan 2022 06:18), Max: 99 (18 Jan 2022 21:48)  HR: 98 (19 Jan 2022 06:18) (92 - 106)  BP: 108/68 (19 Jan 2022 06:18) (93/58 - 126/69)  RR: 18 (19 Jan 2022 06:18) (16 - 18)  SpO2: 96% (19 Jan 2022 06:18) (94% - 98%)      I&O's Detail    18 Jan 2022 07:01  -  19 Jan 2022 07:00  --------------------------------------------------------  IN:    Oral Fluid: 480 mL  Total IN: 480 mL    OUT:    Other (mL): 3000 mL  Total OUT: 3000 mL    Total NET: -2520 mL                              9.5    5.90  )-----------( 165      ( 19 Jan 2022 07:16 )             30.6       01-19    138  |  96  |  39<H>  ----------------------------<  118<H>  4.1   |  25  |  8.13<H>    Ca    9.6      19 Jan 2022 07:16  Phos  5.8     01-19  Mg     2.3     01-19          CAPILLARY BLOOD GLUCOSE  POCT Blood Glucose.: 130 mg/dL (19 Jan 2022 08:44)        PHYSICAL EXAM:  Constitutional: NAD, A&O x 4  Cardiovascular: RRR  Gastrointestinal: abd soft, ND/NT  Extremities: right foot with dressing in place, no strikethrough bleeding, left groin soft, no palpable hematoma, warm well perfused.   incision healing well

## 2022-01-19 NOTE — PROGRESS NOTE ADULT - PROBLEM SELECTOR PLAN 2
I Leonardo Malone MD have seen and examined the patient today and agree with  the  evaluation, assessment and plan of the surgical house officer  VARSHA Malone MD have personally seen and examined the patient at bedside today at  8am I Leonardo Malone MD agree with  the  evaluation, assessment and plan of the surgical house officer

## 2022-01-19 NOTE — PROGRESS NOTE ADULT - ASSESSMENT
left foot ulcer   on anbx  RLE angio via L CFA access, stenting of mid and distal SFA as well as popliteal and PT trunk, with mynx closure.    CAD s/p cabg  stable   on plavix     chronic systolic chf  stable  improved LV function   cont BB / ace    ESRD  On HD. follow up with renal. Monitor electrolytes, K, ca. Avoid significant nephrotoxic medications.    DM II  Monitor finger stick. Insulin coverage.

## 2022-01-19 NOTE — PROGRESS NOTE ADULT - SUBJECTIVE AND OBJECTIVE BOX
Podiatry pager #: [phone] ([place])/ [phone] ([facility])    Patient is a 51y old  Male who presents with a chief complaint of Diabetic foot ulcer (18 Jan 2022 00:39)       INTERVAL HPI/OVERNIGHT EVENTS:  Patient seen and evaluated at bedside.  Pt is resting comfortable in NAD. Denies N/V/F/C.     Allergies    No Known Allergies    Intolerances        Vital Signs Last 24 Hrs  T(C): 36.7 (19 Jan 2022 06:18), Max: 37.2 (18 Jan 2022 21:48)  T(F): 98 (19 Jan 2022 06:18), Max: 99 (18 Jan 2022 21:48)  HR: 98 (19 Jan 2022 06:18) (92 - 106)  BP: 108/68 (19 Jan 2022 06:18) (93/58 - 126/69)  BP(mean): --  RR: 18 (19 Jan 2022 06:18) (16 - 18)  SpO2: 96% (19 Jan 2022 06:18) (94% - 98%)    LABS:                        9.5    5.90  )-----------( 165      ( 19 Jan 2022 07:16 )             30.6     01-18    138  |  96  |  61<H>  ----------------------------<  68<L>  4.0   |  22  |  11.31<H>    Ca    9.2      18 Jan 2022 07:58  Phos  7.9     01-18  Mg     2.5     01-18          CAPILLARY BLOOD GLUCOSE      POCT Blood Glucose.: 175 mg/dL (18 Jan 2022 21:44)  POCT Blood Glucose.: 82 mg/dL (18 Jan 2022 16:36)  POCT Blood Glucose.: 92 mg/dL (18 Jan 2022 08:42)      Lower Extremity Physical Exam:  Vascular: DP 0/4, PT 1/4 B/L, CFT <3 seconds B/L, Temperature gradient warm to cool B/L  Neuro: Epicritic sensation absent to the level of digits B/L  Musculoskeletal/Ortho: unremarkable   Skin:     s/p 1/17 R foot partial first ray resection closed, sutures intact, no acute dehiscence, no acute SOI, flaps warm and viable    RADIOLOGY & ADDITIONAL TESTS:

## 2022-01-19 NOTE — DISCHARGE NOTE NURSING/CASE MANAGEMENT/SOCIAL WORK - NSDCPEFALRISK_GEN_ALL_CORE
For information on Fall & Injury Prevention, visit: [email or web address] OR  [email or web address] OR  https://www.cdc.gov/steadi/patient.html

## 2022-01-19 NOTE — PROGRESS NOTE ADULT - PROBLEM SELECTOR PROBLEM 2
Arterial insufficiency with ischemic ulcer
Anemia secondary to renal failure
Arterial insufficiency with ischemic ulcer

## 2022-01-19 NOTE — PROGRESS NOTE ADULT - SUBJECTIVE AND OBJECTIVE BOX
Patient is a 51y old  Male who presents with a chief complaint of Diabetic foot ulcer (19 Jan 2022 08:51)      SUBJECTIVE / OVERNIGHT EVENTS: no new events    MEDICATIONS  (STANDING):  atorvastatin 40 milliGRAM(s) Oral at bedtime  carvedilol 6.25 milliGRAM(s) Oral every 12 hours  cilostazol 50 milliGRAM(s) Oral every 12 hours  clopidogrel Tablet 75 milliGRAM(s) Oral daily  dextrose 40% Gel 15 Gram(s) Oral once  dextrose 5%. 1000 milliLiter(s) (50 mL/Hr) IV Continuous <Continuous>  dextrose 5%. 1000 milliLiter(s) (100 mL/Hr) IV Continuous <Continuous>  dextrose 50% Injectable 25 Gram(s) IV Push once  dextrose 50% Injectable 12.5 Gram(s) IV Push once  dextrose 50% Injectable 25 Gram(s) IV Push once  glucagon  Injectable 1 milliGRAM(s) IntraMuscular once  heparin   Injectable 5000 Unit(s) SubCutaneous every 8 hours  insulin glargine Injectable (LANTUS) 10 Unit(s) SubCutaneous at bedtime  insulin lispro (ADMELOG) corrective regimen sliding scale   SubCutaneous three times a day before meals  insulin lispro (ADMELOG) corrective regimen sliding scale   SubCutaneous at bedtime  lisinopril 2.5 milliGRAM(s) Oral daily  midodrine. 5 milliGRAM(s) Oral <User Schedule>  Nephro-bridger 1 Tablet(s) Oral daily  piperacillin/tazobactam IVPB.. 3.375 Gram(s) IV Intermittent every 12 hours    MEDICATIONS  (PRN):  HYDROmorphone  Injectable 2 milliGRAM(s) IV Push every 4 hours PRN Severe Pain (7 - 10)  oxycodone    5 mG/acetaminophen 325 mG 1 Tablet(s) Oral every 4 hours PRN Moderate Pain (4 - 6)      Vital Signs Last 24 Hrs  T(F): 98.8 (01-19-22 @ 13:31), Max: 98.8 (01-19-22 @ 13:31)  HR: 100 (01-19-22 @ 13:31) (95 - 100)  BP: 98/63 (01-19-22 @ 13:31) (93/58 - 108/68)  RR: 18 (01-19-22 @ 13:31) (17 - 18)  SpO2: 98% (01-19-22 @ 13:31) (96% - 98%)  Telemetry:   CAPILLARY BLOOD GLUCOSE      POCT Blood Glucose.: 178 mg/dL (19 Jan 2022 12:50)  POCT Blood Glucose.: 130 mg/dL (19 Jan 2022 08:44)    I&O's Summary    18 Jan 2022 07:01  -  19 Jan 2022 07:00  --------------------------------------------------------  IN: 480 mL / OUT: 3000 mL / NET: -2520 mL    19 Jan 2022 07:01  -  19 Jan 2022 22:39  --------------------------------------------------------  IN: 560 mL / OUT: 0 mL / NET: 560 mL        PHYSICAL EXAM:  GENERAL: NAD, well-developed  HEAD:  Atraumatic, Normocephalic  EYES: EOMI, PERRLA, conjunctiva and sclera clear  NECK: Supple, No JVD  CHEST/LUNG: Clear to auscultation bilaterally; No wheeze  HEART: Regular rate and rhythm; No murmurs, rubs, or gallops  ABDOMEN: Soft, Nontender, Nondistended; Bowel sounds present  EXTREMITIES:  2+ Peripheral Pulses, No clubbing, cyanosis, or edema  PSYCH: AAOx3  NEUROLOGY: non-focal  SKIN: No rashes or lesions    LABS:                        9.5    5.90  )-----------( 165      ( 19 Jan 2022 07:16 )             30.6     01-19    138  |  96  |  39<H>  ----------------------------<  118<H>  4.1   |  25  |  8.13<H>    Ca    9.6      19 Jan 2022 07:16  Phos  5.8     01-19  Mg     2.3     01-19                RADIOLOGY & ADDITIONAL TESTS:    Imaging Personally Reviewed:    Consultant(s) Notes Reviewed:      Care Discussed with Consultants/Other Providers:

## 2022-01-21 ENCOUNTER — TRANSCRIPTION ENCOUNTER (OUTPATIENT)
Age: 52
End: 2022-01-21

## 2022-01-22 LAB
CULTURE RESULTS: NO GROWTH — SIGNIFICANT CHANGE UP
CULTURE RESULTS: NO GROWTH — SIGNIFICANT CHANGE UP
SPECIMEN SOURCE: SIGNIFICANT CHANGE UP
SPECIMEN SOURCE: SIGNIFICANT CHANGE UP

## 2022-02-08 ENCOUNTER — APPOINTMENT (OUTPATIENT)
Dept: VASCULAR SURGERY | Facility: CLINIC | Age: 52
End: 2022-02-08

## 2022-02-14 LAB — SURGICAL PATHOLOGY STUDY: SIGNIFICANT CHANGE UP

## 2022-02-17 ENCOUNTER — APPOINTMENT (OUTPATIENT)
Dept: VASCULAR SURGERY | Facility: CLINIC | Age: 52
End: 2022-02-17
Payer: MEDICARE

## 2022-02-17 VITALS
DIASTOLIC BLOOD PRESSURE: 69 MMHG | BODY MASS INDEX: 29.33 KG/M2 | WEIGHT: 198 LBS | SYSTOLIC BLOOD PRESSURE: 124 MMHG | HEART RATE: 94 BPM | HEIGHT: 69 IN

## 2022-02-17 VITALS — TEMPERATURE: 97.16 F

## 2022-02-17 PROCEDURE — 93922 UPR/L XTREMITY ART 2 LEVELS: CPT

## 2022-02-17 PROCEDURE — 99213 OFFICE O/P EST LOW 20 MIN: CPT

## 2022-02-17 NOTE — DATA REVIEWED
[FreeTextEntry1] : 2/17/2022 JAN: Right JAN 1.22 Left JAN 0.71 consistent w/ moderate arterial disease on the left

## 2022-02-17 NOTE — PHYSICAL EXAM
[2+] : left 2+ [Alert] : alert [Oriented to Person] : oriented to person [Oriented to Place] : oriented to place [Oriented to Time] : oriented to time [Calm] : calm [1+] : left 1+ [Ankle Swelling (On Exam)] : not present [de-identified] : well  [FreeTextEntry1] : Difficult to appreciate distal pulses\par Bilateral lower extremities appear warm and perfused w/ moderate arterial insufficiency and moderate trophic skin changes.\par Right foot hallux incision healing, no s/s of infection. LLE no open wounds\par LUE AVF good bruit and thrill  [de-identified] : soft, NT,ND, No palpable mass [de-identified] : VALDEMARL

## 2022-02-17 NOTE — ASSESSMENT
[Arterial/Venous Disease] : arterial/venous disease [Medication Management] : medication management [Foot care/Footwear] : foot care/footwear [FreeTextEntry1] : Impression arterial insufficiency stable s/p right leg stents x 4, right foot incision healing well, ESRD on HD LUE AVF working well \par \par Medical Conservative Management skin/foot protection measures, walking/exercise as tolerated\par Continue ASA, Pletal and Plavix\par Pt cleared by cards for Pletal, spoke w/ Dr Braga \par f/u w/ pods Dr Myers \par RTO in 1-2 months for f/u visit and right leg arterial duplex s/o stent stenosis. Pt prefers in office visit w/ MD

## 2022-02-17 NOTE — HISTORY OF PRESENT ILLNESS
[FreeTextEntry1] : 52 y/o m w/ multiple comorbidities including but not limited to CHF, AICD, CAD, CABG, DM and ESRD on HD via LUE AVF (by Dr Malone in 2017) seen in hospital for right foot hallux OM. He is s/p right leg angio w/ Dr Malone on 1/14/22 and stents were placed to the mid and distal SFA, Pop and PT Trunk. Then underwent right foot hallux resection w/ Dr Myers on 1/17/22. He denies any new complaints. Denies claudication or rest pain. Denies fever or chills. He completed course of ABx. He is taking ASA 81, Statin, Plavix and Pletal 50mg BID.

## 2022-02-23 ENCOUNTER — APPOINTMENT (OUTPATIENT)
Dept: TRANSPLANT | Facility: CLINIC | Age: 52
End: 2022-02-23
Payer: COMMERCIAL

## 2022-02-23 ENCOUNTER — NON-APPOINTMENT (OUTPATIENT)
Age: 52
End: 2022-02-23

## 2022-02-23 ENCOUNTER — APPOINTMENT (OUTPATIENT)
Dept: NEPHROLOGY | Facility: CLINIC | Age: 52
End: 2022-02-23
Payer: COMMERCIAL

## 2022-02-23 VITALS
OXYGEN SATURATION: 100 % | WEIGHT: 198 LBS | HEIGHT: 69 IN | HEART RATE: 93 BPM | RESPIRATION RATE: 15 BRPM | TEMPERATURE: 208.4 F | DIASTOLIC BLOOD PRESSURE: 76 MMHG | BODY MASS INDEX: 29.33 KG/M2 | SYSTOLIC BLOOD PRESSURE: 134 MMHG

## 2022-02-23 DIAGNOSIS — Z95.1 PRESENCE OF AORTOCORONARY BYPASS GRAFT: ICD-10-CM

## 2022-02-23 PROCEDURE — 99072 ADDL SUPL MATRL&STAF TM PHE: CPT

## 2022-02-23 PROCEDURE — 99204 OFFICE O/P NEW MOD 45 MIN: CPT

## 2022-02-23 PROCEDURE — 99205 OFFICE O/P NEW HI 60 MIN: CPT

## 2022-02-23 RX ORDER — CARVEDILOL 3.12 MG/1
3.12 TABLET, FILM COATED ORAL
Qty: 60 | Refills: 3 | Status: DISCONTINUED | COMMUNITY
Start: 2017-05-08 | End: 2022-02-23

## 2022-02-23 RX ORDER — CHLORHEXIDINE GLUCONATE 4 %
325 (65 FE) LIQUID (ML) TOPICAL TWICE DAILY
Refills: 0 | Status: DISCONTINUED | COMMUNITY
End: 2022-02-23

## 2022-02-23 RX ORDER — AMLODIPINE BESYLATE 10 MG/1
10 TABLET ORAL DAILY
Refills: 0 | Status: DISCONTINUED | COMMUNITY
Start: 2017-05-08 | End: 2022-02-23

## 2022-02-23 RX ORDER — HYDRALAZINE HYDROCHLORIDE 25 MG/1
25 TABLET ORAL 3 TIMES DAILY
Qty: 90 | Refills: 3 | Status: DISCONTINUED | COMMUNITY
Start: 2017-05-08 | End: 2022-02-23

## 2022-02-23 RX ORDER — ISOSORBIDE DINITRATE 10 MG/1
10 TABLET ORAL
Qty: 90 | Refills: 3 | Status: DISCONTINUED | COMMUNITY
Start: 2017-05-08 | End: 2022-02-23

## 2022-02-23 NOTE — ASSESSMENT
[FreeTextEntry1] : 50yo man with esrd for kidney transplant evaluation\par borderline candidate\par 1) cardiac evaluation and clearance: 40% EF, aicd\par 2) ct chest abdo pelvis with run-offs\par 3) questions about compliance - pt is a month from right leg angio and 4 arterial stents, not taking plavix because "maybe ran out" and "maybe needs to get another script from Dr Malone"\par 4) need to determine how long he is on plavix for \par 5) colonoscopy\par

## 2022-02-23 NOTE — PHYSICAL EXAM
[Normal] : normal [Soft] : soft [Non-tender] : non-tender [] : right dorsalis pedis non-palpable [TextBox_16] : sternotomy scar, palpable defibrillator [TextBox_34] : no edema, right hallux amputation, skin intact

## 2022-02-23 NOTE — PLAN
[FreeTextEntry1] : 1.  ESRD on dialysis - Mr. Salvador is a fair candidate for kidney transplantation.  Though young he has several macrovascular complications of diabetes and ESRD including PVD, CAD s/p CABG and right hallux resection.  He has no living donors but does have 5 years of dialysis time.  \par 2.  Diabetes - multiple complications.  Check A1c\par 3.  CAD - pt s/p CABG, has AICD and EF 40%.  Will need cardiology clearance\par 4.  PVD - s/p stents in right leg on plavix, aspirin and pletal.  Will need to hold plavix before surgery.  Need to discuss with vascular and will need CTA of iliac arteries with run off.  Foot needs to be healed before he can be transplanted. \par 5.  Cancer screening - will need colonoscopy.\par \par I have personally discussed the risks and benefits of transplantation and patient attended transplant education class where the following was disclosed:\par  \par Reviewed factors affecting survival and morbidity while on dialysis, the transplant wait list and reviewed carmen-operative and long-term risk factors affecting outcome in kidney transplantation.  \par  \par One year SRTR outcomes for national and [facility] were discussed in regards to patient survival and graft survival after transplantation.  \par  \par Details of transplant surgery, including complications were discussed.\par Immunosuppression and complications including infection including life threatening sepsis and opportunistic infections, malignancy and new onset diabetes were discussed.  \par  \par Benefits of live donor transplantation as well as variability in wait times across regions and multiple listing were discussed. \par KDPI >85% and PHS high risk criteria donors were discussed. \par HCV kidney transplantation was discussed.\par  \par Will proceed with completing/ updating work up and listing for transplant/ live donor transplant once work up is reviewed and found to be acceptable by multidisciplinary listing committee.\par \par

## 2022-02-23 NOTE — CONSULT LETTER
[Dear  ___] : Dear  [unfilled], [Consult Letter:] : I had the pleasure of evaluating your patient, [unfilled]. [Please see my note below.] : Please see my note below. [Consult Closing:] : Thank you very much for allowing me to participate in the care of this patient.  If you have any questions, please do not hesitate to contact me. [Sincerely,] : Sincerely, [FreeTextEntry3] : Charlie Corado, DO

## 2022-02-23 NOTE — REASON FOR VISIT
[Initial] : an initial visit for [End-Stage Renal Disease] : end-stage renal disease [Kidney Transplant Evaluation] : kidney transplant evaluation [FreeTextEntry2] : Sadaf

## 2022-02-23 NOTE — HISTORY OF PRESENT ILLNESS
[Diabetes Mellitus] : Diabetes Mellitus [Other: ___] : [unfilled] [Cardiac History] : cardiac history [Claudication/Angina] : claudication/angina [Diabetes] : diabetes [Lower Extremity Ulcers] : lower extremity ulcers [Not Working] : Not working [70: Cares for self; unalbe to carry on normal activity or do active work.] : 70: Cares for self; unable to carry on normal activity or do active work. [Previous Kidney Transplant] : no previous kidney transplant [Blood Transfusion] : no prior blood transfusion [Hx of DVT/Thrombosis/Miscarriage] : no history of dvt/thrombosis/miscarriage [Retinopathy] : no retinopathy [Neuropathy] : no neuropathy [Insulin] : no insulin treatment [TextBox_16] : 01/16 [TextBox_28] : 01/10 [TextBox_30] : 1/2 mile [TextBox_42] : 52yo with ESRD for kidney transplant evaluation\par IHD via left forearm avf TTS\par minimal urine output\par \par PMHx\par cardiac - CHF, CABGx4 and ICD 2016 - no chest pain\par Echo 1/14/22 - EF 40%, normal pulmonary pressures\par T2DM - januvia controlled\par denies respiratory, GI, psychiatric issues\par denies TB, hepatitis, hiv\par covid infection - mild infection, vaccinated\par \par PSurg Hx\par PVD - 1/14/22 - 4 stents in right leg: mid and distal right superficial femoral artery, right popliteal x2\par right hallux resection - osteomyelitis 1/17/22\par \par colonoscopy - no prev\par \par Medications reviewed\par includes plavix and aspirin\par \par SHx\par lives with sister (suffers from MS) and mother (79yo)\par self-caring, does own cooking, cleaning\par drives\par ex-smoker stopped 5y ago\par stopped etoh 5y ago\par

## 2022-02-23 NOTE — HISTORY OF PRESENT ILLNESS
[Diabetes Mellitus] : Diabetes Mellitus [TextBox_42] : Pt is a 51 y.o male with ESRD on HD who presents for kidney transplant evaluation.  His nephrologist is Dr. Bharath Romo.  \par HD via left forearm avf TTS since 2016.  Developed ESRD about 6 months after CABGx4 (surgery in [facility]).  \par He has minimal urine output.  \par ESRD is secondary to diabetes.  Pt has had diabetes diagnosed in 2016 but was told he had it much longer.  He is currently on Januvia, never insulin.  He has mild neuropathy.  Denies retinopathy.  He had a right hallux resection - osteomyelitis 1/17/22.  He has PVD and is s/p 4 stents in right leg: mid and distal right superficial femoral artery, right popliteal x2 on  1/14/22.\par covid infection - mild infection, vaccinated\par Passed a kidney stone once. \par No cancer history.  \par He has never had a transplant evaluation.  \par \par PMHx\par cardiac - CHF, CABGx4 and ICD 2016 - no chest pain\par Echo 1/14/22 - EF 40%, normal pulmonary pressures\par \par PSurg Hx\par PVD - 1/14/22 - 4 stents in right leg: mid and distal right superficial femoral artery, right popliteal x2\par right hallux resection - osteomyelitis 1/17/22.  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  \par \par colonoscopy - no prev\par \par Medications reviewed\par includes plavix and aspirin\par \par SHx\par lives with sister (suffers from MS) and mother (81yo)\par self-caring, does own cooking, cleaning\par drives\par ex-smoker stopped 5y ago\par stopped etoh 5y ago

## 2022-02-23 NOTE — REVIEW OF SYSTEMS
[Joint Pain] : joint pain [Negative] : Heme/Lymph [FreeTextEntry5] : stents in right leg [FreeTextEntry8] : does not urinate [de-identified] : neuropathy of feet [FreeTextEntry9] : chronic back pain. right foot amputation.

## 2022-02-23 NOTE — PHYSICAL EXAM
[General Appearance - Alert] : alert [General Appearance - In No Acute Distress] : in no acute distress [General Appearance - Well Nourished] : well nourished [Extraocular Movements] : extraocular movements were intact [Sclera] : the sclera and conjunctiva were normal [Outer Ear] : the ears and nose were normal in appearance [Neck Appearance] : the appearance of the neck was normal [Neck Cervical Mass (___cm)] : no neck mass was observed [Respiration, Rhythm And Depth] : normal respiratory rhythm and effort [Exaggerated Use Of Accessory Muscles For Inspiration] : no accessory muscle use [Auscultation Breath Sounds / Voice Sounds] : lungs were clear to auscultation bilaterally [Heart Rate And Rhythm] : heart rate was normal and rhythm regular [Heart Sounds] : normal S1 and S2 [Edema] : there was no peripheral edema [Bowel Sounds] : normal bowel sounds [Abdomen Soft] : soft [Abdomen Tenderness] : non-tender [Cervical Lymph Nodes Enlarged Posterior Bilaterally] : posterior cervical [Cervical Lymph Nodes Enlarged Anterior Bilaterally] : anterior cervical [Supraclavicular Lymph Nodes Enlarged Bilaterally] : supraclavicular [Musculoskeletal - Swelling] : no joint swelling seen [FreeTextEntry1] : walks stooped with limp.  Right foot amputation in boot.  [Skin Color & Pigmentation] : normal skin color and pigmentation [Skin Turgor] : normal skin turgor [] : no rash [Oriented To Time, Place, And Person] : oriented to person, place, and time [Impaired Insight] : insight and judgment were intact [Affect] : the affect was normal

## 2022-03-03 LAB
ABO + RH PNL BLD: NORMAL
ABO + RH PNL BLD: NORMAL
ALBUMIN SERPL ELPH-MCNC: 4.1 G/DL
ALP BLD-CCNC: 109 U/L
ALT SERPL-CCNC: 11 U/L
ANION GAP SERPL CALC-SCNC: 21 MMOL/L
AST SERPL-CCNC: 11 U/L
BASOPHILS # BLD AUTO: 0.03 K/UL
BASOPHILS NFR BLD AUTO: 0.4 %
BILIRUB SERPL-MCNC: 0.2 MG/DL
BUN SERPL-MCNC: 69 MG/DL
C PEPTIDE SERPL-MCNC: 14 NG/ML
CALCIUM SERPL-MCNC: 9.2 MG/DL
CHLORIDE SERPL-SCNC: 99 MMOL/L
CHOLEST SERPL-MCNC: 100 MG/DL
CMV IGG SERPL QL: >10 U/ML
CMV IGG SERPL-IMP: POSITIVE
CO2 SERPL-SCNC: 22 MMOL/L
COVID-19 NUCLEOCAPSID  GAM ANTIBODY INTERPRETATION: POSITIVE
COVID-19 SPIKE DOMAIN ANTIBODY INTERPRETATION: POSITIVE
CREAT SERPL-MCNC: 10.86 MG/DL
EBV DNA SERPL NAA+PROBE-ACNC: NOT DETECTED IU/ML
EBV EA AB SER IA-ACNC: <5 U/ML
EBV EA AB TITR SER IF: POSITIVE
EBV EA IGG SER QL IA: >600 U/ML
EBV EA IGG SER-ACNC: NEGATIVE
EBV EA IGM SER IA-ACNC: NEGATIVE
EBV PATRN SPEC IB-IMP: NORMAL
EBV VCA IGG SER IA-ACNC: >750 U/ML
EBV VCA IGM SER QL IA: 31.1 U/ML
EOSINOPHIL # BLD AUTO: 0 K/UL
EOSINOPHIL NFR BLD AUTO: 0 %
EPSTEIN-BARR VIRUS CAPSID ANTIGEN IGG: POSITIVE
ESTIMATED AVERAGE GLUCOSE: 111 MG/DL
GLUCOSE SERPL-MCNC: 139 MG/DL
HAV IGM SER QL: NONREACTIVE
HBA1C MFR BLD HPLC: 5.5 %
HBV CORE IGG+IGM SER QL: NONREACTIVE
HBV SURFACE AB SER QL: NONREACTIVE
HBV SURFACE AB SERPL IA-ACNC: 4.4 MIU/ML
HBV SURFACE AG SER QL: NONREACTIVE
HCT VFR BLD CALC: 39.3 %
HCV AB SER QL: NONREACTIVE
HCV S/CO RATIO: 0.2 S/CO
HDLC SERPL-MCNC: 40 MG/DL
HEPATITIS A IGG ANTIBODY: NONREACTIVE
HGB BLD-MCNC: 11.5 G/DL
HIV1+2 AB SPEC QL IA.RAPID: NONREACTIVE
HSV 1+2 IGG SER IA-IMP: NEGATIVE
HSV 1+2 IGG SER IA-IMP: POSITIVE
HSV1 IGG SER QL: >62.2 INDEX
HSV2 IGG SER QL: 0.13 INDEX
IMM GRANULOCYTES NFR BLD AUTO: 0.4 %
LDLC SERPL CALC-MCNC: 49 MG/DL
LYMPHOCYTES # BLD AUTO: 0.92 K/UL
LYMPHOCYTES NFR BLD AUTO: 13.6 %
M TB IFN-G BLD-IMP: NEGATIVE
MAGNESIUM SERPL-MCNC: 2.6 MG/DL
MAN DIFF?: NORMAL
MCHC RBC-ENTMCNC: 29.3 GM/DL
MCHC RBC-ENTMCNC: 29.4 PG
MCV RBC AUTO: 100.5 FL
MONOCYTES # BLD AUTO: 0.48 K/UL
MONOCYTES NFR BLD AUTO: 7.1 %
NEUTROPHILS # BLD AUTO: 5.31 K/UL
NEUTROPHILS NFR BLD AUTO: 78.5 %
NONHDLC SERPL-MCNC: 60 MG/DL
PHOSPHATE SERPL-MCNC: 7.3 MG/DL
PLATELET # BLD AUTO: 225 K/UL
POTASSIUM SERPL-SCNC: 5.5 MMOL/L
PROT SERPL-MCNC: 7.2 G/DL
PSA SERPL-MCNC: 0.65 NG/ML
QUANTIFERON TB PLUS MITOGEN MINUS NIL: 9.98 IU/ML
QUANTIFERON TB PLUS NIL: 0.02 IU/ML
QUANTIFERON TB PLUS TB1 MINUS NIL: 0.01 IU/ML
QUANTIFERON TB PLUS TB2 MINUS NIL: 0.01 IU/ML
RBC # BLD: 3.91 M/UL
RBC # FLD: 18.7 %
RUBV IGG FLD-ACNC: 5.4 INDEX
RUBV IGG SER-IMP: POSITIVE
SARS-COV-2 AB SERPL IA-ACNC: >250 U/ML
SARS-COV-2 AB SERPL QL IA: 2.46 INDEX
SODIUM SERPL-SCNC: 142 MMOL/L
T GONDII AB SER-IMP: NEGATIVE
T GONDII IGG SER QL: <3 IU/ML
T PALLIDUM AB SER QL IA: NEGATIVE
TRIGL SERPL-MCNC: 58 MG/DL
VZV AB TITR SER: POSITIVE
VZV IGG SER IF-ACNC: 2501 INDEX
WBC # FLD AUTO: 6.77 K/UL

## 2022-04-11 NOTE — ED ADULT NURSE NOTE - NS ED NURSE LEVEL OF CONSCIOUSNESS MENTAL STATUS
I have examined and spoken with the patient this morning. She has no new medical issues or complaints. She reports no new medicines since H&P. She again understands procedure, risks/benefits and agrees to proceed.   Gabrielle Wall MD  7:46 AM  04/11/22
Cooperative/Awake/Alert

## 2022-04-21 ENCOUNTER — INPATIENT (INPATIENT)
Facility: HOSPITAL | Age: 52
LOS: 4 days | Discharge: HOME | DRG: 617 | End: 2022-04-26
Attending: INTERNAL MEDICINE | Admitting: INTERNAL MEDICINE
Payer: MEDICARE

## 2022-04-21 VITALS
DIASTOLIC BLOOD PRESSURE: 79 MMHG | HEIGHT: 69 IN | OXYGEN SATURATION: 98 % | HEART RATE: 96 BPM | TEMPERATURE: 98 F | WEIGHT: 199.96 LBS | RESPIRATION RATE: 18 BRPM | SYSTOLIC BLOOD PRESSURE: 144 MMHG

## 2022-04-21 DIAGNOSIS — M86.9 OSTEOMYELITIS, UNSPECIFIED: ICD-10-CM

## 2022-04-21 DIAGNOSIS — Z89.422 ACQUIRED ABSENCE OF OTHER LEFT TOE(S): Chronic | ICD-10-CM

## 2022-04-21 DIAGNOSIS — Z95.1 PRESENCE OF AORTOCORONARY BYPASS GRAFT: Chronic | ICD-10-CM

## 2022-04-21 DIAGNOSIS — N18.6 END STAGE RENAL DISEASE: ICD-10-CM

## 2022-04-21 DIAGNOSIS — N18.9 CHRONIC KIDNEY DISEASE, UNSPECIFIED: ICD-10-CM

## 2022-04-21 DIAGNOSIS — Z95.810 PRESENCE OF AUTOMATIC (IMPLANTABLE) CARDIAC DEFIBRILLATOR: Chronic | ICD-10-CM

## 2022-04-21 LAB
ALBUMIN SERPL ELPH-MCNC: 3.8 G/DL — SIGNIFICANT CHANGE UP (ref 3.3–5)
ALP SERPL-CCNC: 97 U/L — SIGNIFICANT CHANGE UP (ref 40–120)
ALT FLD-CCNC: 8 U/L — LOW (ref 10–45)
ANION GAP SERPL CALC-SCNC: 18 MMOL/L — HIGH (ref 5–17)
APTT BLD: 31.8 SEC — SIGNIFICANT CHANGE UP (ref 27.5–35.5)
AST SERPL-CCNC: 11 U/L — SIGNIFICANT CHANGE UP (ref 10–40)
BASOPHILS # BLD AUTO: 0.03 K/UL — SIGNIFICANT CHANGE UP (ref 0–0.2)
BASOPHILS NFR BLD AUTO: 0.3 % — SIGNIFICANT CHANGE UP (ref 0–2)
BILIRUB SERPL-MCNC: 0.3 MG/DL — SIGNIFICANT CHANGE UP (ref 0.2–1.2)
BUN SERPL-MCNC: 66 MG/DL — HIGH (ref 7–23)
CALCIUM SERPL-MCNC: 9.3 MG/DL — SIGNIFICANT CHANGE UP (ref 8.4–10.5)
CHLORIDE SERPL-SCNC: 95 MMOL/L — LOW (ref 96–108)
CO2 SERPL-SCNC: 24 MMOL/L — SIGNIFICANT CHANGE UP (ref 22–31)
CREAT SERPL-MCNC: 7.86 MG/DL — HIGH (ref 0.5–1.3)
CRP SERPL-MCNC: 50 MG/L — HIGH (ref 0–4)
EGFR: 8 ML/MIN/1.73M2 — LOW
EOSINOPHIL # BLD AUTO: 0 K/UL — SIGNIFICANT CHANGE UP (ref 0–0.5)
EOSINOPHIL NFR BLD AUTO: 0 % — SIGNIFICANT CHANGE UP (ref 0–6)
ERYTHROCYTE [SEDIMENTATION RATE] IN BLOOD: 69 MM/HR — HIGH (ref 0–20)
GLUCOSE BLDC GLUCOMTR-MCNC: 94 MG/DL — SIGNIFICANT CHANGE UP (ref 70–99)
GLUCOSE SERPL-MCNC: 144 MG/DL — HIGH (ref 70–99)
HCT VFR BLD CALC: 29.3 % — LOW (ref 39–50)
HGB BLD-MCNC: 9.4 G/DL — LOW (ref 13–17)
IMM GRANULOCYTES NFR BLD AUTO: 0.6 % — SIGNIFICANT CHANGE UP (ref 0–1.5)
INR BLD: 1.18 RATIO — HIGH (ref 0.88–1.16)
LYMPHOCYTES # BLD AUTO: 0.64 K/UL — LOW (ref 1–3.3)
LYMPHOCYTES # BLD AUTO: 6.5 % — LOW (ref 13–44)
MCHC RBC-ENTMCNC: 30.5 PG — SIGNIFICANT CHANGE UP (ref 27–34)
MCHC RBC-ENTMCNC: 32.1 GM/DL — SIGNIFICANT CHANGE UP (ref 32–36)
MCV RBC AUTO: 95.1 FL — SIGNIFICANT CHANGE UP (ref 80–100)
MONOCYTES # BLD AUTO: 0.78 K/UL — SIGNIFICANT CHANGE UP (ref 0–0.9)
MONOCYTES NFR BLD AUTO: 7.9 % — SIGNIFICANT CHANGE UP (ref 2–14)
MRSA PCR RESULT.: SIGNIFICANT CHANGE UP
NEUTROPHILS # BLD AUTO: 8.39 K/UL — HIGH (ref 1.8–7.4)
NEUTROPHILS NFR BLD AUTO: 84.7 % — HIGH (ref 43–77)
NRBC # BLD: 0 /100 WBCS — SIGNIFICANT CHANGE UP (ref 0–0)
PLATELET # BLD AUTO: 176 K/UL — SIGNIFICANT CHANGE UP (ref 150–400)
POTASSIUM SERPL-MCNC: 5.3 MMOL/L — SIGNIFICANT CHANGE UP (ref 3.5–5.3)
POTASSIUM SERPL-SCNC: 5.3 MMOL/L — SIGNIFICANT CHANGE UP (ref 3.5–5.3)
PROT SERPL-MCNC: 7.1 G/DL — SIGNIFICANT CHANGE UP (ref 6–8.3)
PROTHROM AB SERPL-ACNC: 13.7 SEC — HIGH (ref 10.5–13.4)
RBC # BLD: 3.08 M/UL — LOW (ref 4.2–5.8)
RBC # FLD: 15.3 % — HIGH (ref 10.3–14.5)
S AUREUS DNA NOSE QL NAA+PROBE: SIGNIFICANT CHANGE UP
SARS-COV-2 RNA SPEC QL NAA+PROBE: SIGNIFICANT CHANGE UP
SODIUM SERPL-SCNC: 137 MMOL/L — SIGNIFICANT CHANGE UP (ref 135–145)
WBC # BLD: 9.9 K/UL — SIGNIFICANT CHANGE UP (ref 3.8–10.5)
WBC # FLD AUTO: 9.9 K/UL — SIGNIFICANT CHANGE UP (ref 3.8–10.5)

## 2022-04-21 PROCEDURE — 73620 X-RAY EXAM OF FOOT: CPT | Mod: 26,RT

## 2022-04-21 PROCEDURE — 99221 1ST HOSP IP/OBS SF/LOW 40: CPT

## 2022-04-21 PROCEDURE — 99284 EMERGENCY DEPT VISIT MOD MDM: CPT

## 2022-04-21 PROCEDURE — 99223 1ST HOSP IP/OBS HIGH 75: CPT | Mod: GC

## 2022-04-21 RX ORDER — ERTAPENEM SODIUM 1 G/1
500 INJECTION, POWDER, LYOPHILIZED, FOR SOLUTION INTRAMUSCULAR; INTRAVENOUS ONCE
Refills: 0 | Status: COMPLETED | OUTPATIENT
Start: 2022-04-21 | End: 2022-04-21

## 2022-04-21 RX ORDER — DEXTROSE 50 % IN WATER 50 %
25 SYRINGE (ML) INTRAVENOUS ONCE
Refills: 0 | Status: DISCONTINUED | OUTPATIENT
Start: 2022-04-21 | End: 2022-04-26

## 2022-04-21 RX ORDER — PIPERACILLIN AND TAZOBACTAM 4; .5 G/20ML; G/20ML
3.38 INJECTION, POWDER, LYOPHILIZED, FOR SOLUTION INTRAVENOUS ONCE
Refills: 0 | Status: DISCONTINUED | OUTPATIENT
Start: 2022-04-21 | End: 2022-04-21

## 2022-04-21 RX ORDER — INSULIN LISPRO 100/ML
VIAL (ML) SUBCUTANEOUS
Refills: 0 | Status: DISCONTINUED | OUTPATIENT
Start: 2022-04-21 | End: 2022-04-26

## 2022-04-21 RX ORDER — ATORVASTATIN CALCIUM 80 MG/1
40 TABLET, FILM COATED ORAL AT BEDTIME
Refills: 0 | Status: DISCONTINUED | OUTPATIENT
Start: 2022-04-21 | End: 2022-04-26

## 2022-04-21 RX ORDER — SODIUM CHLORIDE 9 MG/ML
1000 INJECTION, SOLUTION INTRAVENOUS
Refills: 0 | Status: DISCONTINUED | OUTPATIENT
Start: 2022-04-21 | End: 2022-04-26

## 2022-04-21 RX ORDER — HEPARIN SODIUM 5000 [USP'U]/ML
5000 INJECTION INTRAVENOUS; SUBCUTANEOUS EVERY 12 HOURS
Refills: 0 | Status: DISCONTINUED | OUTPATIENT
Start: 2022-04-21 | End: 2022-04-26

## 2022-04-21 RX ORDER — ASPIRIN/CALCIUM CARB/MAGNESIUM 324 MG
81 TABLET ORAL DAILY
Refills: 0 | Status: DISCONTINUED | OUTPATIENT
Start: 2022-04-21 | End: 2022-04-26

## 2022-04-21 RX ORDER — ERTAPENEM SODIUM 1 G/1
500 INJECTION, POWDER, LYOPHILIZED, FOR SOLUTION INTRAMUSCULAR; INTRAVENOUS EVERY 24 HOURS
Refills: 0 | Status: DISCONTINUED | OUTPATIENT
Start: 2022-04-22 | End: 2022-04-26

## 2022-04-21 RX ORDER — ERTAPENEM SODIUM 1 G/1
INJECTION, POWDER, LYOPHILIZED, FOR SOLUTION INTRAMUSCULAR; INTRAVENOUS
Refills: 0 | Status: DISCONTINUED | OUTPATIENT
Start: 2022-04-21 | End: 2022-04-26

## 2022-04-21 RX ORDER — GLUCAGON INJECTION, SOLUTION 0.5 MG/.1ML
1 INJECTION, SOLUTION SUBCUTANEOUS ONCE
Refills: 0 | Status: DISCONTINUED | OUTPATIENT
Start: 2022-04-21 | End: 2022-04-26

## 2022-04-21 RX ORDER — CILOSTAZOL 100 MG/1
50 TABLET ORAL EVERY 12 HOURS
Refills: 0 | Status: DISCONTINUED | OUTPATIENT
Start: 2022-04-21 | End: 2022-04-26

## 2022-04-21 RX ORDER — DEXTROSE 50 % IN WATER 50 %
12.5 SYRINGE (ML) INTRAVENOUS ONCE
Refills: 0 | Status: DISCONTINUED | OUTPATIENT
Start: 2022-04-21 | End: 2022-04-26

## 2022-04-21 RX ORDER — VANCOMYCIN HCL 1 G
1000 VIAL (EA) INTRAVENOUS ONCE
Refills: 0 | Status: COMPLETED | OUTPATIENT
Start: 2022-04-21 | End: 2022-04-21

## 2022-04-21 RX ORDER — CLOPIDOGREL BISULFATE 75 MG/1
75 TABLET, FILM COATED ORAL DAILY
Refills: 0 | Status: DISCONTINUED | OUTPATIENT
Start: 2022-04-21 | End: 2022-04-26

## 2022-04-21 RX ORDER — CARVEDILOL PHOSPHATE 80 MG/1
6.25 CAPSULE, EXTENDED RELEASE ORAL EVERY 12 HOURS
Refills: 0 | Status: DISCONTINUED | OUTPATIENT
Start: 2022-04-21 | End: 2022-04-26

## 2022-04-21 RX ORDER — DEXTROSE 50 % IN WATER 50 %
15 SYRINGE (ML) INTRAVENOUS ONCE
Refills: 0 | Status: DISCONTINUED | OUTPATIENT
Start: 2022-04-21 | End: 2022-04-26

## 2022-04-21 RX ORDER — LISINOPRIL 2.5 MG/1
2.5 TABLET ORAL DAILY
Refills: 0 | Status: DISCONTINUED | OUTPATIENT
Start: 2022-04-21 | End: 2022-04-26

## 2022-04-21 RX ADMIN — CILOSTAZOL 50 MILLIGRAM(S): 100 TABLET ORAL at 21:31

## 2022-04-21 RX ADMIN — ATORVASTATIN CALCIUM 40 MILLIGRAM(S): 80 TABLET, FILM COATED ORAL at 21:13

## 2022-04-21 RX ADMIN — CARVEDILOL PHOSPHATE 6.25 MILLIGRAM(S): 80 CAPSULE, EXTENDED RELEASE ORAL at 21:13

## 2022-04-21 RX ADMIN — ERTAPENEM SODIUM 100 MILLIGRAM(S): 1 INJECTION, POWDER, LYOPHILIZED, FOR SOLUTION INTRAMUSCULAR; INTRAVENOUS at 14:24

## 2022-04-21 RX ADMIN — HEPARIN SODIUM 5000 UNIT(S): 5000 INJECTION INTRAVENOUS; SUBCUTANEOUS at 21:12

## 2022-04-21 RX ADMIN — Medication 250 MILLIGRAM(S): at 11:28

## 2022-04-21 NOTE — H&P ADULT - HISTORY OF PRESENT ILLNESS
50yo M with PMH of DM, ESRD on hd  cad/ cabg  chf, , presents to ED from Dr Myers podiatry office for eval of R 2nd toe.   Patient states has developed a wound and pain at the second toe for past week.   Patient denies fever, chills. saw Dr. Myers today who noted bon exposed at 2nd toe distal phalanx and referred to ER for vascular/ID clearance for toe amputation.

## 2022-04-21 NOTE — H&P ADULT - NSHPLABSRESULTS_GEN_ALL_CORE
9.4    9.90  )-----------( 176      ( 21 Apr 2022 10:53 )             29.3       04-21    137  |  95<L>  |  66<H>  ----------------------------<  144<H>  5.3   |  24  |  7.86<H>    Ca    9.3      21 Apr 2022 10:53    TPro  7.1  /  Alb  3.8  /  TBili  0.3  /  DBili  x   /  AST  11  /  ALT  8<L>  /  AlkPhos  97  04-21                  PT/INR - ( 21 Apr 2022 10:53 )   PT: 13.7 sec;   INR: 1.18 ratio         PTT - ( 21 Apr 2022 10:53 )  PTT:31.8 sec    Lactate Trend            CAPILLARY BLOOD GLUCOSE

## 2022-04-21 NOTE — ED PROVIDER NOTE - OBJECTIVE STATEMENT
50yo M with PMH of DM, ESRD TTS, chf, aicd, presents to ED from Dr Myers podiatry office for eval of R 2nd toe. Patient states has developed a wound and pain at the second toe for past week. Patient denies fever, chills. saw Dr. Myers today who noted bon exposed at 2nd toe distal phalanx and referred to ER for vascular/ID clearance for toe amputation.

## 2022-04-21 NOTE — H&P ADULT - NEUROLOGICAL
negative Statement Selected Alert & oriented; no sensory, motor or coordination deficits, normal reflexes

## 2022-04-21 NOTE — PATIENT PROFILE ADULT - FALL HARM RISK - HARM RISK INTERVENTIONS

## 2022-04-21 NOTE — ED PROVIDER NOTE - CLINICAL SUMMARY MEDICAL DECISION MAKING FREE TEXT BOX
Mckayla: Patient with right second toe wound with distal bone exposed and gangrenous. no sensation. + DM. history of 1st toe amputation on right foot. will get imaging, podiatry consult, labs, admit for amputation.

## 2022-04-21 NOTE — ED PROVIDER NOTE - PHYSICAL EXAMINATION
RIGHT: warm foot, 1st toe amputation.   2nd toe wound, erythematous with gangrene at distal tip and exposed bone with no sensation. RIGHT: warm foot, 1st toe amputation.   2nd toe wound, erythematous with gangrene at distal tip and exposed bone with no sensation, + malodorous. RIGHT: warm foot, 1st toe amputation.   2nd toe wound, erythematous with gangrene at distal tip and exposed bone with no sensation, + malodorous.  dp pulse not palpable. < 3 sec cap refill.

## 2022-04-21 NOTE — CONSULT NOTE ADULT - ASSESSMENT
pt know to me;   6o years old with DM, CAD, PVD, CABG, ESRD, history of left great toe amputation (1/22)  presents with right toe ulcer present for about a week. Might be related to him peeling some skin off the tow.  no fever or chills  no systemic signs  the wd is clean but does probe down to bone     needs vascular revaluation  as I cant barely feel pulses.  Await cultures but will  start Invanz

## 2022-04-21 NOTE — CONSULT NOTE ADULT - PROBLEM SELECTOR RECOMMENDATION 2
Hb currently within goal. Will need to verify outpatient LUX prescription and resume with HD today. Monitor Hb    Torey Rosas  Nephrology Fellow    (After 5pm or on weekends please page the on-call fellow)
VARSHA Malone MD performed a history and physical exam of the patient and discussed  the findings and plan with the house officer. I reviewed the resident note and agree with the findings and plan   I Leonardo Malone MD have personally seen and examined the patient at bedside today 4/21/2022 at  4 pm

## 2022-04-21 NOTE — ED ADULT NURSE NOTE - OBJECTIVE STATEMENT
1037 pt 51ym aox4 on dialysis tues thurs sat/states sent from dr hameed of[facility] for poss amputations of rt toes, noted pt has hx toe amputations, rt fistula for hd use/pt in no acute distress pending eval/dispo admission

## 2022-04-21 NOTE — CONSULT NOTE ADULT - ATTENDING COMMENTS
Selina Millan MD  Office : [phone]
VARSHA Malone MD performed a history and physical exam of the patient and discussed  the findings and plan with the house officer. I reviewed the resident note and agree with the findings and plan   I Leonardo Malone MD have personally seen and examined the patient at bedside today 4/21/2022 at  4 pm

## 2022-04-21 NOTE — CONSULT NOTE ADULT - ASSESSMENT
51yM w/ PAD and R 2nd toe wound    - Recommend Arterial duplex of LLE to assess stent patency.  - C/W ASA 81 and Pletal 50 BID for PAD  - Pt discussed w/ Dr. Malone  - Please page [phone] w/ any questions    MILES Santos PGY-3 51yM w/ PAD and R 2nd toe wound s/p rle angio and vasc endo intervention s/p rt leg  4 ba/stents  w single rle runoff via peroneal artery into the foot     - Recommend Arterial duplex of RLE to assess stent patency.  - C/W ASA 81 and Pletal 50 BID for PAD  - rt toe 2  care and planning as per podiatry   will follow

## 2022-04-21 NOTE — CONSULT NOTE ADULT - ASSESSMENT
50yo M w/ right foot 2nd digit wound to bone   - pt seen and evaluated  - afebrile, WBC 9.9, ESR/CRP pending   - Right foot distal 2nd digit wound w/ bone exposed, necrotic tissue bed, malodorous, no purulence, no tracking proximal  - Excisional debridement of necrotic tissue from distal 2nd toe to level of bone using sterile suture removal kit & #15 blade   - Right foot wound culture taken  - Recommend admission to medicine under Dr. Alexandr Cuellar  - Recommend vascular consult, pt of Dr. Malone  - Recommend ID consult w/ Dr. Boyd   - Recommend IV Vanco, Zosyn  - Please interrogate AICD for pre-op clearance   - Pod plan for Right foot partial 2nd ray resection on Monday 4/25 at 1230pm w/ Dr. Golden  - Please document medical optimization for surgery under light sedation w/ local anesthesia   - discussed w/ attending  50yo M w/ right foot 2nd digit wound to bone   - pt seen and evaluated  - afebrile, WBC 9.9, ESR/CRP pending   - Right foot distal 2nd digit wound w/ bone exposed, necrotic tissue bed, malodorous, no purulence, no tracking proximal  - Excisional debridement of necrotic tissue from distal 2nd toe to level of bone using sterile suture removal kit & #15 blade   - Right foot wound culture taken  - Recommend admission to medicine under Dr. Alexandr Cuellar  - Recommend vascular consult, pt of Dr. Malone  - Recommend ID consult w/ Dr. Boyd   - Recommend IV Vanco, Zosyn  - Please interrogate AICD for pre-op clearance   - Recommend R foot MR w/ and w/o contrast if AICD compatible   - Pod plan for Right foot partial 2nd ray resection on Monday 4/25 at 1230pm w/ Dr. Golden  - Please document medical optimization for surgery under light sedation w/ local anesthesia   - discussed w/ attending

## 2022-04-21 NOTE — CONSULT NOTE ADULT - PROBLEM SELECTOR RECOMMENDATION 9
Pt. with ESRD on HD three times a week (TTS). Last HD was on 4/19/22 via [facility]. Pt. clinically stable. Labs reviewed. Will arrange for maintenance HD today. Monitor labs. Consent for HD obtained and placed in chart.
VARSHA Malone MD performed a history and physical exam of the patient and discussed  the findings and plan with the house officer. I reviewed the resident note and agree with the findings and plan   I Leonardo Malone MD have personally seen and examined the patient at bedside today 4/21/2022 at  4 pm

## 2022-04-21 NOTE — H&P ADULT - ASSESSMENT
pt w/ infected r second toe likely needing amputation  vasc studies  vasc/ pod eval  cards clearance  hd as per renal  dm  fsg riss  c/w outp meds  gi dvt proph

## 2022-04-21 NOTE — CONSULT NOTE ADULT - EXTREMITIES COMMENTS
moderate art insuff w moderate trophic skin changes  rt toe 2 tip  wound ischemic ulcer w exposed bone

## 2022-04-22 DIAGNOSIS — I77.1 STRICTURE OF ARTERY: ICD-10-CM

## 2022-04-22 LAB
A1C WITH ESTIMATED AVERAGE GLUCOSE RESULT: 6.2 % — HIGH (ref 4–5.6)
ALBUMIN SERPL ELPH-MCNC: 3.6 G/DL — SIGNIFICANT CHANGE UP (ref 3.3–5)
ALP SERPL-CCNC: 92 U/L — SIGNIFICANT CHANGE UP (ref 40–120)
ALT FLD-CCNC: 10 U/L — SIGNIFICANT CHANGE UP (ref 10–45)
ANION GAP SERPL CALC-SCNC: 14 MMOL/L — SIGNIFICANT CHANGE UP (ref 5–17)
AST SERPL-CCNC: 10 U/L — SIGNIFICANT CHANGE UP (ref 10–40)
BILIRUB SERPL-MCNC: 0.3 MG/DL — SIGNIFICANT CHANGE UP (ref 0.2–1.2)
BUN SERPL-MCNC: 34 MG/DL — HIGH (ref 7–23)
CALCIUM SERPL-MCNC: 9.4 MG/DL — SIGNIFICANT CHANGE UP (ref 8.4–10.5)
CHLORIDE SERPL-SCNC: 99 MMOL/L — SIGNIFICANT CHANGE UP (ref 96–108)
CO2 SERPL-SCNC: 26 MMOL/L — SIGNIFICANT CHANGE UP (ref 22–31)
CREAT SERPL-MCNC: 5.25 MG/DL — HIGH (ref 0.5–1.3)
EGFR: 13 ML/MIN/1.73M2 — LOW
ESTIMATED AVERAGE GLUCOSE: 131 MG/DL — HIGH (ref 68–114)
GLUCOSE BLDC GLUCOMTR-MCNC: 109 MG/DL — HIGH (ref 70–99)
GLUCOSE BLDC GLUCOMTR-MCNC: 121 MG/DL — HIGH (ref 70–99)
GLUCOSE BLDC GLUCOMTR-MCNC: 133 MG/DL — HIGH (ref 70–99)
GLUCOSE BLDC GLUCOMTR-MCNC: 138 MG/DL — HIGH (ref 70–99)
GLUCOSE SERPL-MCNC: 119 MG/DL — HIGH (ref 70–99)
HCT VFR BLD CALC: 29.8 % — LOW (ref 39–50)
HGB BLD-MCNC: 9.3 G/DL — LOW (ref 13–17)
MCHC RBC-ENTMCNC: 29.8 PG — SIGNIFICANT CHANGE UP (ref 27–34)
MCHC RBC-ENTMCNC: 31.2 GM/DL — LOW (ref 32–36)
MCV RBC AUTO: 95.5 FL — SIGNIFICANT CHANGE UP (ref 80–100)
NRBC # BLD: 0 /100 WBCS — SIGNIFICANT CHANGE UP (ref 0–0)
PLATELET # BLD AUTO: 197 K/UL — SIGNIFICANT CHANGE UP (ref 150–400)
POTASSIUM SERPL-MCNC: 4.5 MMOL/L — SIGNIFICANT CHANGE UP (ref 3.5–5.3)
POTASSIUM SERPL-SCNC: 4.5 MMOL/L — SIGNIFICANT CHANGE UP (ref 3.5–5.3)
PROT SERPL-MCNC: 7.3 G/DL — SIGNIFICANT CHANGE UP (ref 6–8.3)
RBC # BLD: 3.12 M/UL — LOW (ref 4.2–5.8)
RBC # FLD: 15.3 % — HIGH (ref 10.3–14.5)
SODIUM SERPL-SCNC: 139 MMOL/L — SIGNIFICANT CHANGE UP (ref 135–145)
WBC # BLD: 6.7 K/UL — SIGNIFICANT CHANGE UP (ref 3.8–10.5)
WBC # FLD AUTO: 6.7 K/UL — SIGNIFICANT CHANGE UP (ref 3.8–10.5)

## 2022-04-22 PROCEDURE — 93971 EXTREMITY STUDY: CPT | Mod: 26,RT

## 2022-04-22 PROCEDURE — 99232 SBSQ HOSP IP/OBS MODERATE 35: CPT

## 2022-04-22 PROCEDURE — 93926 LOWER EXTREMITY STUDY: CPT | Mod: 26,RT

## 2022-04-22 PROCEDURE — 93282 PRGRMG EVAL IMPLANTABLE DFB: CPT | Mod: 26

## 2022-04-22 PROCEDURE — 93923 UPR/LXTR ART STDY 3+ LVLS: CPT | Mod: 26

## 2022-04-22 RX ADMIN — HEPARIN SODIUM 5000 UNIT(S): 5000 INJECTION INTRAVENOUS; SUBCUTANEOUS at 21:29

## 2022-04-22 RX ADMIN — Medication 81 MILLIGRAM(S): at 13:47

## 2022-04-22 RX ADMIN — CLOPIDOGREL BISULFATE 75 MILLIGRAM(S): 75 TABLET, FILM COATED ORAL at 13:47

## 2022-04-22 RX ADMIN — CARVEDILOL PHOSPHATE 6.25 MILLIGRAM(S): 80 CAPSULE, EXTENDED RELEASE ORAL at 21:29

## 2022-04-22 RX ADMIN — CILOSTAZOL 50 MILLIGRAM(S): 100 TABLET ORAL at 10:22

## 2022-04-22 RX ADMIN — CILOSTAZOL 50 MILLIGRAM(S): 100 TABLET ORAL at 21:30

## 2022-04-22 RX ADMIN — ATORVASTATIN CALCIUM 40 MILLIGRAM(S): 80 TABLET, FILM COATED ORAL at 21:29

## 2022-04-22 RX ADMIN — CARVEDILOL PHOSPHATE 6.25 MILLIGRAM(S): 80 CAPSULE, EXTENDED RELEASE ORAL at 10:22

## 2022-04-22 RX ADMIN — LISINOPRIL 2.5 MILLIGRAM(S): 2.5 TABLET ORAL at 05:22

## 2022-04-22 RX ADMIN — ERTAPENEM SODIUM 100 MILLIGRAM(S): 1 INJECTION, POWDER, LYOPHILIZED, FOR SOLUTION INTRAMUSCULAR; INTRAVENOUS at 14:45

## 2022-04-22 RX ADMIN — HEPARIN SODIUM 5000 UNIT(S): 5000 INJECTION INTRAVENOUS; SUBCUTANEOUS at 10:23

## 2022-04-22 NOTE — PROGRESS NOTE ADULT - ASSESSMENT
pt w/ infected r second toe likely needing amputation  vasc studies  vasc/ pod eval appreciated   cards clearance noted  MR foot if compatible  HD as per renal    DM  continue finger sticks with short acting insulin sliding scale  no oral meds    gi dvt proph

## 2022-04-22 NOTE — CONSULT NOTE ADULT - ASSESSMENT
left foot ulcer   on anbx  plan for toe amputation   fu with podiatry and vascular surgery     CAD s/p cabg  stable   asa, statin    chronic systolic chf  stable  improving LV function   cont BB / ace    ESRD  On HD. follow up with renal. Monitor electrolytes, K, ca. Avoid significant nephrotoxic medications.    DM II  Monitor finger stick. Insulin coverage. Diabetic education and Diabetic diet. Consider nutrition consultation.    Pre-Operative Cardiac Risk Stratification and Optimization  Based on patient history and physical exam, the patient is considered to have elevated risk   recent echo in my office last week showed EF 50 percent   pt has no active or symptomatic cardiac issues to preclude low risk procedures   can proceed with acceptable elevated risk for this urgent procedure

## 2022-04-22 NOTE — PROGRESS NOTE ADULT - SUBJECTIVE AND OBJECTIVE BOX
Patient is a 51y old  Male who presents with a chief complaint of R 2nd toe wound (21 Apr 2022 18:11)      DATE OF SERVICE: 04-22-22 @ 16:09    SUBJECTIVE / OVERNIGHT EVENTS: overnight events noted    ROS:  Resp: No cough no sputum production  CVS: No chest pain no palpitations no orthopnea  GI: no N/V/D  : no dysuria, no hematuria  Neuro: no weakness no paresthesias  Heme: No petechiae no easy bruising  Msk: No joint pain no swelling  Skin: No rash no itching        MEDICATIONS  (STANDING):  aspirin  chewable 81 milliGRAM(s) Oral daily  atorvastatin 40 milliGRAM(s) Oral at bedtime  carvedilol 6.25 milliGRAM(s) Oral every 12 hours  cilostazol 50 milliGRAM(s) Oral every 12 hours  clopidogrel Tablet 75 milliGRAM(s) Oral daily  dextrose 5%. 1000 milliLiter(s) (50 mL/Hr) IV Continuous <Continuous>  dextrose 5%. 1000 milliLiter(s) (100 mL/Hr) IV Continuous <Continuous>  dextrose 50% Injectable 25 Gram(s) IV Push once  dextrose 50% Injectable 12.5 Gram(s) IV Push once  dextrose 50% Injectable 25 Gram(s) IV Push once  ertapenem  IVPB      ertapenem  IVPB 500 milliGRAM(s) IV Intermittent every 24 hours  glucagon  Injectable 1 milliGRAM(s) IntraMuscular once  heparin   Injectable 5000 Unit(s) SubCutaneous every 12 hours  insulin lispro (ADMELOG) corrective regimen sliding scale   SubCutaneous three times a day before meals  lisinopril 2.5 milliGRAM(s) Oral daily    MEDICATIONS  (PRN):  dextrose Oral Gel 15 Gram(s) Oral once PRN Blood Glucose LESS THAN 70 milliGRAM(s)/deciliter        CAPILLARY BLOOD GLUCOSE      POCT Blood Glucose.: 133 mg/dL (22 Apr 2022 13:13)  POCT Blood Glucose.: 121 mg/dL (22 Apr 2022 08:14)  POCT Blood Glucose.: 94 mg/dL (21 Apr 2022 21:06)    I&O's Summary    21 Apr 2022 07:01  -  22 Apr 2022 07:00  --------------------------------------------------------  IN: 0 mL / OUT: 3000 mL / NET: -3000 mL        Vital Signs Last 24 Hrs  T(C): 37.1 (22 Apr 2022 13:53), Max: 37.1 (22 Apr 2022 13:53)  T(F): 98.8 (22 Apr 2022 13:53), Max: 98.8 (22 Apr 2022 13:53)  HR: 88 (22 Apr 2022 13:53) (60 - 94)  BP: 122/68 (22 Apr 2022 13:53) (121/62 - 150/60)  BP(mean): --  RR: 18 (22 Apr 2022 13:53) (18 - 20)  SpO2: 97% (22 Apr 2022 13:53) (96% - 99%)    PHYSICAL EXAM:  GENERAL: in no apparent distress  HEAD:  Atraumatic, Normocephalic  EYES: EOMI, PERRLA, sclera clear  NECK: Supple, No JVD  CHEST/LUNG: clear b/l, no wheeze  HEART: S1 S2; no murmurs appreciated  ABDOMEN: Soft, Nontender, Bowel sounds present  EXTREMITIES:  No clubbing or cyanosis,  no edema  NEUROLOGY: AO x 3 non-focal  SKIN: No rashes or lesions    LABS:                        9.3    6.70  )-----------( 197      ( 22 Apr 2022 07:02 )             29.8     04-22    139  |  99  |  34<H>  ----------------------------<  119<H>  4.5   |  26  |  5.25<H>    Ca    9.4      22 Apr 2022 06:58    TPro  7.3  /  Alb  3.6  /  TBili  0.3  /  DBili  x   /  AST  10  /  ALT  10  /  AlkPhos  92  04-22    PT/INR - ( 21 Apr 2022 10:53 )   PT: 13.7 sec;   INR: 1.18 ratio         PTT - ( 21 Apr 2022 10:53 )  PTT:31.8 sec            All consultant(s) notes reviewed and care discussed with other providers        Contact Number, Dr Murphy [phone]

## 2022-04-22 NOTE — PROCEDURE NOTE - ADDITIONAL PROCEDURE DETAILS
Indication for interrogation: Possible MRI/Going to OR  Presenting rhythm: SR 90s  Measured data WNL, normal ICD function, Pt is NOT pacemaker dependent  Stored data revealed episodes recorded as VT/NSVT consistent with T wave oversensing, no therapies delivered  RV sense polarity changed to Tip to Coil from Bipolar and RV sensitivity increased to 0.6 mV from 0.3 mV to avoid oversensing  Patients device is MRI conditional and form left in chart    JANETTE Silveira PA-C  [id number] Indication for interrogation: Possible MRI/Going to OR  Presenting rhythm: SR 90s  Measured data WNL, normal ICD function, Pt is NOT pacemaker dependent  Optivol elevated 3/16/22-4/8/22, within alex limits now but trending upward  Stored data revealed episodes recorded as VT/NSVT consistent with T wave oversensing, no therapies delivered  RV sense polarity changed to Tip to Coil from Bipolar and RV sensitivity increased to 0.6 mV from 0.3 mV to avoid oversensing  Patients device is MRI conditional and form left in chart    JANETTE Silveira PA-C  [id number]

## 2022-04-22 NOTE — PROGRESS NOTE ADULT - SUBJECTIVE AND OBJECTIVE BOX
Patient is a 51y old  Male who presents with a chief complaint of R 2nd toe wound (21 Apr 2022 18:11)       INTERVAL HPI/OVERNIGHT EVENTS:  Patient seen and evaluated at bedside.  Pt is resting comfortable in NAD. Denies N/V/F/C.     Allergies    No Known Allergies    Intolerances        Vital Signs Last 24 Hrs  T(C): 36.7 (22 Apr 2022 05:17), Max: 36.9 (21 Apr 2022 20:14)  T(F): 98.1 (22 Apr 2022 05:17), Max: 98.5 (21 Apr 2022 20:14)  HR: 92 (22 Apr 2022 05:17) (60 - 96)  BP: 129/61 (22 Apr 2022 05:17) (121/62 - 150/60)  BP(mean): --  RR: 18 (22 Apr 2022 05:17) (18 - 20)  SpO2: 96% (22 Apr 2022 05:17) (96% - 99%)    LABS:                        9.3    6.70  )-----------( 197      ( 22 Apr 2022 07:02 )             29.8     04-22    139  |  99  |  34<H>  ----------------------------<  119<H>  4.5   |  26  |  5.25<H>    Ca    9.4      22 Apr 2022 06:58    TPro  7.3  /  Alb  3.6  /  TBili  0.3  /  DBili  x   /  AST  10  /  ALT  10  /  AlkPhos  92  04-22    PT/INR - ( 21 Apr 2022 10:53 )   PT: 13.7 sec;   INR: 1.18 ratio         PTT - ( 21 Apr 2022 10:53 )  PTT:31.8 sec    CAPILLARY BLOOD GLUCOSE      POCT Blood Glucose.: 121 mg/dL (22 Apr 2022 08:14)  POCT Blood Glucose.: 94 mg/dL (21 Apr 2022 21:06)      Lower Extremity Physical Exam:  Vascular: DP/PT 0/4 B/L, CFT <3 seconds B/L, Temperature gradient warm to cool b/l  Neuro: Epicritic sensation absent to the level of digits B/L  Musculoskeletal/Ortho: s/p right foot partial 1st ray resection closed   Derm: Right foot distal 2nd digit wound w/ bone exposed, necrotic tissue bed, malodorous, no purulence, no tracking proximal

## 2022-04-22 NOTE — CONSULT NOTE ADULT - SUBJECTIVE AND OBJECTIVE BOX
CHIEF COMPLAINT:Patient is a 51y old  Male who presents with a chief complaint of R 2nd toe wound (21 Apr 2022 18:11)      HISTORY OF PRESENT ILLNESS:  51 year old male with HTN, DM II, ESRD on HD, CAD s/p CABG, Ischemic CM with improved systolic function   presents with right foot ulcer   pt denies any chest pain, sob, palpitation, dizziness or syncope.       PAST MEDICAL & SURGICAL HISTORY:  Diabetes mellitus  type 2    Foot ulcer due to secondary DM    Coronary artery disease    Acute on chronic systolic heart failure    Breast Reduction  at age 18    Toe amputation status, left    S/P CABG (coronary artery bypass graft)    AICD (automatic cardioverter/defibrillator) present            MEDICATIONS:  aspirin  chewable 81 milliGRAM(s) Oral daily  carvedilol 6.25 milliGRAM(s) Oral every 12 hours  cilostazol 50 milliGRAM(s) Oral every 12 hours  clopidogrel Tablet 75 milliGRAM(s) Oral daily  heparin   Injectable 5000 Unit(s) SubCutaneous every 12 hours  lisinopril 2.5 milliGRAM(s) Oral daily    ertapenem  IVPB      ertapenem  IVPB 500 milliGRAM(s) IV Intermittent every 24 hours          atorvastatin 40 milliGRAM(s) Oral at bedtime  dextrose 50% Injectable 25 Gram(s) IV Push once  dextrose 50% Injectable 12.5 Gram(s) IV Push once  dextrose 50% Injectable 25 Gram(s) IV Push once  dextrose Oral Gel 15 Gram(s) Oral once PRN  glucagon  Injectable 1 milliGRAM(s) IntraMuscular once  insulin lispro (ADMELOG) corrective regimen sliding scale   SubCutaneous three times a day before meals    dextrose 5%. 1000 milliLiter(s) IV Continuous <Continuous>  dextrose 5%. 1000 milliLiter(s) IV Continuous <Continuous>      FAMILY HISTORY:  Family history of diabetes mellitus (Father)        Non-contributory    SOCIAL HISTORY:    No tobacco, drugs or etoh    Allergies    No Known Allergies    Intolerances    	    REVIEW OF SYSTEMS:  as above  The rest of the 14 points ROS reviewed and except above they are unremarkable.        PHYSICAL EXAM:  T(C): 36.7 (04-22-22 @ 05:17), Max: 36.9 (04-21-22 @ 20:14)  HR: 92 (04-22-22 @ 05:17) (60 - 96)  BP: 129/61 (04-22-22 @ 05:17) (121/62 - 150/60)  RR: 18 (04-22-22 @ 05:17) (18 - 20)  SpO2: 96% (04-22-22 @ 05:17) (96% - 99%)  Wt(kg): --  I&O's Summary    21 Apr 2022 07:01  -  22 Apr 2022 06:57  --------------------------------------------------------  IN: 0 mL / OUT: 3000 mL / NET: -3000 mL        JVP: Normal  Neck: supple  Lung: clear   CV: S1 S2 , Murmur:  Abd: soft  Ext: No edema  neuro: Awake / alert  Psych: flat affect  Skin: left foot ulcer     LABS/DATA:    TELEMETRY: 	    ECG:  	   	< from: TTE with Doppler (w/Cont) (01.14.22 @ 08:10) >  Conclusions:  1. Endocardial visualization enhanced with intravenous  injection of echo contrast (Definity).  Despite the use of  echo contrast the visualization endocardium was still  suboptimal.  Moderate global left ventricular systolic  dysfunction.  2. Mild diastolic dysfunction (Stage I).  3. A device wire is noted in the right heart.  4. The right ventricle is not well visualized; grossly  reduced  right ventricular systolic function.  *** Compared with echocardiogram of 6/7/2017, EF similiar.  ------------------------------------------------------------------------  Confirmed on  1/14/2022 - 16:52:33 by JINNY Timmons  ------------------------------------------------------------------------    < end of copied text >    CARDIAC MARKERS:                                      9.4    9.90  )-----------( 176      ( 21 Apr 2022 10:53 )             29.3     04-21    137  |  95<L>  |  66<H>  ----------------------------<  144<H>  5.3   |  24  |  7.86<H>    Ca    9.3      21 Apr 2022 10:53    TPro  7.1  /  Alb  3.8  /  TBili  0.3  /  DBili  x   /  AST  11  /  ALT  8<L>  /  AlkPhos  97  04-21    proBNP:   Lipid Profile:   HgA1c:   TSH:           
Patient is a 51y old  Male who presents with a chief complaint of   HPI:      PAST MEDICAL & SURGICAL HISTORY:  Diabetes mellitus  type 2    Foot ulcer due to secondary DM    Coronary artery disease    Acute on chronic systolic heart failure    Breast Reduction  at age 18    Toe amputation status, left    S/P CABG (coronary artery bypass graft)    AICD (automatic cardioverter/defibrillator) present        Social history:    FAMILY HISTORY:  Family history of diabetes mellitus (Father)      REVIEW OF SYSTEMS  General:	Denies any malaise fatigue or chills. Fevers absent    Skin:No rash  	  Ophthalmologic:Denies any visual complaints,discharge redness or photophobia   nce    Allergic/Immunologic:	No hives or rash   Allergies    No Known Allergies    Intolerances        Antimicrobials:    piperacillin/tazobactam IVPB... 3.375 Gram(s) IV Intermittent once        Vital Signs Last 24 Hrs  T(C): 36.8 (21 Apr 2022 09:51), Max: 36.8 (21 Apr 2022 09:51)  T(F): 98.3 (21 Apr 2022 09:51), Max: 98.3 (21 Apr 2022 09:51)  HR: 96 (21 Apr 2022 09:51) (96 - 96)  BP: 144/79 (21 Apr 2022 09:51) (144/79 - 144/79)  BP(mean): --  RR: 18 (21 Apr 2022 09:51) (18 - 18)  SpO2: 98% (21 Apr 2022 09:51) (98% - 98%)    PHYSICAL EXAM:Pleasant patient in no acute distress.      Constitutional:Comfortable.Awake and alert  No cachexia     Eyes:PERRL EOMI.NO discharge or conjunctival injection    ENMT:No sinus tenderness.No thrush.No pharyngeal exudate or erythema.Fair dental hygiene    Neck:Supple,No LN,no JVD      Respiratory:Good air entry bilaterally,CTA    Cardiovascular:S1 S2 wnl, No murmurs,rub or gallops    Gastrointestinal:Soft BS(+) no tenderness no masses ,No rebound or guarding    Genitourinary:No CVA tendereness     Rectal:    Extremities: open ulcer on right with wound down to bone   Vascular no :peripheral pulses felt                                   9.4    9.90  )-----------( 176      ( 21 Apr 2022 10:53 )             29.3         04-21    137  |  95<L>  |  66<H>  ----------------------------<  144<H>  5.3   |  24  |  7.86<H>    Ca    9.3      21 Apr 2022 10:53    TPro  7.1  /  Alb  3.8  /  TBili  0.3  /  DBili  x   /  AST  11  /  ALT  8<L>  /  AlkPhos  97  04-21      RECENT CULTURES:      MICROBIOLOGY:          Radiology:      Assessment:        Recommendations and Plan:    Pager [phone]  After 5 pm/weekends or if no response :[phone]      
Podiatry pager #: [phone] ([facility])/ [phone] ([facility])    Patient is a 51y old  Male who presents with a chief complaint of right foot 2nd toe wound     HPI:   50yo M with PMH of DM, ESRD TTS, chf, aicd, presents to ED from Dr Myers podiatry office for eval of R 2nd toe. Patient states has developed a wound and pain at the second toe for past week. Patient denies fever, chills. saw Dr. Myers today who noted bon exposed at 2nd toe distal phalanx and referred to ER for vascular/ID clearance for toe amputation.      PAST MEDICAL & SURGICAL HISTORY:  Diabetes mellitus  type 2    Foot ulcer due to secondary DM    Coronary artery disease    Acute on chronic systolic heart failure    Breast Reduction  at age 18    Toe amputation status, left    S/P CABG (coronary artery bypass graft)    AICD (automatic cardioverter/defibrillator) present        MEDICATIONS  (STANDING):  piperacillin/tazobactam IVPB... 3.375 Gram(s) IV Intermittent once  vancomycin  IVPB. 1000 milliGRAM(s) IV Intermittent once    MEDICATIONS  (PRN):      Allergies    No Known Allergies    Intolerances        VITALS:    Vital Signs Last 24 Hrs  T(C): 36.8 (21 Apr 2022 09:51), Max: 36.8 (21 Apr 2022 09:51)  T(F): 98.3 (21 Apr 2022 09:51), Max: 98.3 (21 Apr 2022 09:51)  HR: 96 (21 Apr 2022 09:51) (96 - 96)  BP: 144/79 (21 Apr 2022 09:51) (144/79 - 144/79)  BP(mean): --  RR: 18 (21 Apr 2022 09:51) (18 - 18)  SpO2: 98% (21 Apr 2022 09:51) (98% - 98%)    LABS:                          9.4    9.90  )-----------( 176      ( 21 Apr 2022 10:53 )             29.3               CAPILLARY BLOOD GLUCOSE              LOWER EXTREMITY PHYSICAL EXAM:    Vascular: DP/PT 0/4 B/L, CFT <3 seconds B/L, Temperature gradient warm to cool b/l  Neuro: Epicritic sensation absent to the level of digits B/L  Musculoskeletal/Ortho: s/p right foot partial 1st ray resection closed   Skin: Right foot distal 2nd digit wound w/ bone exposed, necrotic tissue bed, malodorous, no purulence, no tracking proximal    RADIOLOGY & ADDITIONAL STUDIES:    
                                                                                           Vascular Surgery Consult  Consulting surgical team: Vascular Surgery (Pager [phone])  Consulting attending: Dr. Malone    HPI:  52yo M with PMH of DM, ESRD on hd  cad/ cabg  chf, , presents to ED from Dr Myers podiatry office for eval of R 2nd toe.   Patient states has developed a wound and pain at the second toe for past week.   Patient denies fever, chills. saw Dr. Myers today who noted bon exposed at 2nd toe distal phalanx and referred to ER for vascular/ID clearance for toe amputation. (21 Apr 2022 12:58)    Vascular consulted for R 2nd toe wound. Pt had wound on toe for about 1 month, but unroofed scab from wound in shower yesterday and went to see his podiatrist, Dr. Myers, t/d. His podiatrist instructed him to come to the ED for evaluation. He denies fevers or chills. He is ambulatory w/o assistance. He has chronically decreased sensation on the soles of b/l feet. He had an RLE angio January 2022 that was sig for stenosis of the mid and distal SFA as well as the Pop and TP trunk. All 4 lesions were stented. Pt reports improvement in RLE pain after stents. He is currently taking ASA 81mg QD and Pletal 50mg BID.      PAST MEDICAL HISTORY:  Diabetes mellitus    Foot ulcer due to secondary DM    Coronary artery disease    Acute on chronic systolic heart failure        PAST SURGICAL HISTORY:  Breast Reduction    Toe amputation status, left    S/P CABG (coronary artery bypass graft)    AICD (automatic cardioverter/defibrillator) present        MEDICATIONS:  aspirin  chewable 81 milliGRAM(s) Oral daily  atorvastatin 40 milliGRAM(s) Oral at bedtime  carvedilol 6.25 milliGRAM(s) Oral every 12 hours  cilostazol 50 milliGRAM(s) Oral every 12 hours  clopidogrel Tablet 75 milliGRAM(s) Oral daily  dextrose 5%. 1000 milliLiter(s) IV Continuous <Continuous>  dextrose 5%. 1000 milliLiter(s) IV Continuous <Continuous>  dextrose 50% Injectable 25 Gram(s) IV Push once  dextrose 50% Injectable 12.5 Gram(s) IV Push once  dextrose 50% Injectable 25 Gram(s) IV Push once  dextrose Oral Gel 15 Gram(s) Oral once PRN  ertapenem  IVPB      glucagon  Injectable 1 milliGRAM(s) IntraMuscular once  heparin   Injectable 5000 Unit(s) SubCutaneous every 12 hours  insulin lispro (ADMELOG) corrective regimen sliding scale   SubCutaneous three times a day before meals  lisinopril 2.5 milliGRAM(s) Oral daily      ALLERGIES:  No Known Allergies      VITALS & I/Os:  Vital Signs Last 24 Hrs  T(C): 36.3 (21 Apr 2022 16:35), Max: 36.8 (21 Apr 2022 09:51)  T(F): 97.3 (21 Apr 2022 16:35), Max: 98.3 (21 Apr 2022 09:51)  HR: 66 (21 Apr 2022 16:35) (66 - 96)  BP: 131/71 (21 Apr 2022 16:35) (131/71 - 144/79)  BP(mean): --  RR: 18 (21 Apr 2022 16:35) (18 - 18)  SpO2: 96% (21 Apr 2022 16:35) (96% - 98%)    I&O's Summary      PHYSICAL EXAM:  General: No acute distress  Respiratory: Nonlabored  Cardiovascular: RRR  Abdominal: Soft, nondistended, nontender. No rebound or guarding. No organomegaly, no palpable mass.  Extremities: s/p R 1st Hallux amp. R 2nd to w/ exposed bone at tip and some necrotic tissue at wound edges. DP and PT signals in R foot.    LABS:                        9.4    9.90  )-----------( 176      ( 21 Apr 2022 10:53 )             29.3     04-21    137  |  95<L>  |  66<H>  ----------------------------<  144<H>  5.3   |  24  |  7.86<H>    Ca    9.3      21 Apr 2022 10:53    TPro  7.1  /  Alb  3.8  /  TBili  0.3  /  DBili  x   /  AST  11  /  ALT  8<L>  /  AlkPhos  97  04-21    Lactate:    PT/INR - ( 21 Apr 2022 10:53 )   PT: 13.7 sec;   INR: 1.18 ratio         PTT - ( 21 Apr 2022 10:53 )  PTT:31.8 sec              IMAGING:  < from: Xray Foot AP + Lateral, Right (04.21.22 @ 13:10) >  IMPRESSION:  Redemonstrated hallux amputation defect with stable intact 1st metatarsal   stump remnant.  C/  New ulcerated 2nd toe soft tissues with exposed eroded underlying 2nd   distal phalanx to base level consistent with osteomyelitis. No tracking   gas collections beyond this region and no additional focal areas of   osteomyelitis.    No acute fractures or dislocations.    Tarsometatarsal alignment maintained without evidence for a Lisfranc   injury. Preserved remaining joint spaces and no joint margin erosions.    Slightly osteopenic appearing osseous structures for age otherwise no   discrete suspicious lytic or blastic lesions.    Vascular calcifications again noted.    < end of copied text >  
[facility] DIVISION OF KIDNEY DISEASES AND HYPERTENSION -- [phone]  -- INITIAL CONSULT NOTE  --------------------------------------------------------------------------------  HPI: 51 year old male with ESRD on HD, CAD s/p CABG, CHF s/p AICD admitted to [facility] from Podiatrist's office for evaluation of chronic right foot wound. Nephrology consultation requested for ESRD management. Pt. recently moved to a new HD unit and does not know his outpatient Nephrologist. Pt undergoes maintenance HD on a TTS schedule with last HD on 4/19/22. Pt. usually tolerates 3L UF.     Pt. currently feels well and denied any chest pain, fever, chills, N/V or diarrhea.    PAST HISTORY  --------------------------------------------------------------------------------  PAST MEDICAL & SURGICAL HISTORY:  Diabetes mellitus  Foot ulcer due to secondary DM  Coronary artery disease  Acute on chronic systolic heart failure  Breast Reduction  Toe amputation status, left  S/P CABG (coronary artery bypass graft)  AICD (automatic cardioverter/defibrillator) present    FAMILY HISTORY:  Family history of diabetes mellitus (Father)    PAST SOCIAL HISTORY: Denies recreational drug use    ALLERGIES & MEDICATIONS  --------------------------------------------------------------------------------  Allergies    No Known Allergies    Intolerances    Standing Inpatient Medications  aspirin  chewable 81 milliGRAM(s) Oral daily  atorvastatin 40 milliGRAM(s) Oral at bedtime  carvedilol 6.25 milliGRAM(s) Oral every 12 hours  cilostazol 50 milliGRAM(s) Oral every 12 hours  clopidogrel Tablet 75 milliGRAM(s) Oral daily  dextrose 5%. 1000 milliLiter(s) IV Continuous <Continuous>  dextrose 5%. 1000 milliLiter(s) IV Continuous <Continuous>  dextrose 50% Injectable 25 Gram(s) IV Push once  dextrose 50% Injectable 12.5 Gram(s) IV Push once  dextrose 50% Injectable 25 Gram(s) IV Push once  ertapenem  IVPB      glucagon  Injectable 1 milliGRAM(s) IntraMuscular once  heparin   Injectable 5000 Unit(s) SubCutaneous every 12 hours  insulin lispro (ADMELOG) corrective regimen sliding scale   SubCutaneous three times a day before meals  lisinopril 2.5 milliGRAM(s) Oral daily    REVIEW OF SYSTEMS  --------------------------------------------------------------------------------  Gen: Normal appetite  Respiratory: No dyspnea  CV: No chest pain  GI: No abdominal pain  MSK: See HPI  Neuro: No dizziness    VITALS/PHYSICAL EXAM  --------------------------------------------------------------------------------  T(C): 36.8 (04-21-22 @ 09:51), Max: 36.8 (04-21-22 @ 09:51)  HR: 96 (04-21-22 @ 09:51) (96 - 96)  BP: 144/79 (04-21-22 @ 09:51) (144/79 - 144/79)  RR: 18 (04-21-22 @ 09:51) (18 - 18)  SpO2: 98% (04-21-22 @ 09:51) (98% - 98%)  Wt(kg): --  Height (cm): 175.3 (04-21-22 @ 09:51)  Weight (kg): 90.7 (04-21-22 @ 09:51)  BMI (kg/m2): 29.5 (04-21-22 @ 09:51)  BSA (m2): 2.07 (04-21-22 @ 09:51)    Physical Exam:  	Gen: Well appearing male in NAD  	HEENT: Anicteric  	Pulm: CTA B/L  	CV: S1S2  	Abd: Soft, +BS   	Ext: No LE edema B/L, Left foot in clean dressing noted  	Neuro: Awake  	Skin: Warm and dry  	Vascular access: LUE AVF with palpable thrill    LABS/STUDIES  --------------------------------------------------------------------------------              9.4    9.90  >-----------<  176      [04-21-22 @ 10:53]              29.3     137  |  95  |  66  ----------------------------<  144      [04-21-22 @ 10:53]  5.3   |  24  |  7.86        Ca     9.3     [04-21-22 @ 10:53]    TPro  7.1  /  Alb  3.8  /  TBili  0.3  /  DBili  x   /  AST  11  /  ALT  8   /  AlkPhos  97  [04-21-22 @ 10:53]    Creatinine Trend:  SCr 7.86 [04-21 @ 10:53]    Urinalysis - [05-29-17 @ 15:30]      Color Yellow / Appearance Clear / SG 1.016 / pH 5.5      Gluc Negative / Ketone Negative  / Bili Negative / Urobili Negative       Blood Negative / Protein 300 / Leuk Est Small / Nitrite Negative      RBC 0-2 / WBC 3-5 / Hyaline  / Gran  / Sq Epi  / Non Sq Epi OCC / Bacteria     HbA1c 6.4      [04-27-17 @ 07:23]    HBsAb <3.0      [05-30-17 @ 08:37]  HBsAg Nonreact      [01-16-22 @ 01:31]  HCV 0.25, Nonreact      [01-16-22 @ 01:31]

## 2022-04-22 NOTE — PROGRESS NOTE ADULT - ASSESSMENT
51yM w/ PAD and R 2nd toe wound    - Recommend Arterial duplex of RLE to assess stent patency.  - C/W ASA 81 and Pletal 50 BID for PAD  - will follow

## 2022-04-22 NOTE — PROGRESS NOTE ADULT - SUBJECTIVE AND OBJECTIVE BOX
Patient is a 51y old  Male who presents with a chief complaint of R 2nd toe wound (21 Apr 2022 18:11)      Vascular Surgery Attending Progress Note    Interval HPI: pt w/o new c/o     Medications:  aspirin  chewable 81 milliGRAM(s) Oral daily  atorvastatin 40 milliGRAM(s) Oral at bedtime  carvedilol 6.25 milliGRAM(s) Oral every 12 hours  cilostazol 50 milliGRAM(s) Oral every 12 hours  clopidogrel Tablet 75 milliGRAM(s) Oral daily  dextrose 5%. 1000 milliLiter(s) IV Continuous <Continuous>  dextrose 5%. 1000 milliLiter(s) IV Continuous <Continuous>  dextrose 50% Injectable 25 Gram(s) IV Push once  dextrose 50% Injectable 12.5 Gram(s) IV Push once  dextrose 50% Injectable 25 Gram(s) IV Push once  dextrose Oral Gel 15 Gram(s) Oral once PRN  ertapenem  IVPB      ertapenem  IVPB 500 milliGRAM(s) IV Intermittent every 24 hours  glucagon  Injectable 1 milliGRAM(s) IntraMuscular once  heparin   Injectable 5000 Unit(s) SubCutaneous every 12 hours  insulin lispro (ADMELOG) corrective regimen sliding scale   SubCutaneous three times a day before meals  lisinopril 2.5 milliGRAM(s) Oral daily      Vital Signs Last 24 Hrs  T(C): 36.7 (22 Apr 2022 05:17), Max: 36.9 (21 Apr 2022 20:14)  T(F): 98.1 (22 Apr 2022 05:17), Max: 98.5 (21 Apr 2022 20:14)  HR: 92 (22 Apr 2022 05:17) (60 - 96)  BP: 129/61 (22 Apr 2022 05:17) (121/62 - 150/60)  BP(mean): --  RR: 18 (22 Apr 2022 05:17) (18 - 20)  SpO2: 96% (22 Apr 2022 05:17) (96% - 99%)  I&O's Summary    21 Apr 2022 07:01  -  22 Apr 2022 07:00  --------------------------------------------------------  IN: 0 mL / OUT: 3000 mL / NET: -3000 mL        Physical Exam:  Neuro  A&Ox3 VSS  Vascular:  rle  calf and foot warm well perfused  rt toe 2 wound stable  dressing c/d/i    LABS:                        9.3    6.70  )-----------( 197      ( 22 Apr 2022 07:02 )             29.8     04-22    139  |  99  |  34<H>  ----------------------------<  119<H>  4.5   |  26  |  5.25<H>    Ca    9.4      22 Apr 2022 06:58    TPro  7.3  /  Alb  3.6  /  TBili  0.3  /  DBili  x   /  AST  10  /  ALT  10  /  AlkPhos  92  04-22    PT/INR - ( 21 Apr 2022 10:53 )   PT: 13.7 sec;   INR: 1.18 ratio         PTT - ( 21 Apr 2022 10:53 )  PTT:31.8 sec    STEVE DYER MD  [phone] Cell [phone]

## 2022-04-22 NOTE — PROGRESS NOTE ADULT - ASSESSMENT
52yo M w/ right foot 2nd digit wound to bone     - Pt seen and evaluated  - Afebrile, WBC 9.9, ESR 69, CRP 50  - Right foot distal 2nd digit wound w/ bone exposed to level of IPJ, necrotic wound bed w/ no wet conversion   - Ordered R foot MR w/ and w/o contrast for surgical planning- if AICD compatible   - Right foot wound culture pending, cont IV abx, ID recs appreciated   - Vasc appreciated, pending RLE arterial duplex to assess stent patency   - Pod plan for Right foot partial 2nd ray resection on Monday 4/25 at 1230pm w/ Dr. Golden pending final vasc recs/ clearance  - Cardiac clearance documented, appreciated  - Please interrogate AICD for pre-op clearance   - Seen w/ attending

## 2022-04-23 LAB
-  AMIKACIN: SIGNIFICANT CHANGE UP
-  AMOXICILLIN/CLAVULANIC ACID: SIGNIFICANT CHANGE UP
-  AMPICILLIN/SULBACTAM: SIGNIFICANT CHANGE UP
-  AMPICILLIN: SIGNIFICANT CHANGE UP
-  AMPICILLIN: SIGNIFICANT CHANGE UP
-  AZTREONAM: SIGNIFICANT CHANGE UP
-  CEFAZOLIN: SIGNIFICANT CHANGE UP
-  CEFEPIME: SIGNIFICANT CHANGE UP
-  CEFOXITIN: SIGNIFICANT CHANGE UP
-  CEFTRIAXONE: SIGNIFICANT CHANGE UP
-  CIPROFLOXACIN: SIGNIFICANT CHANGE UP
-  ERTAPENEM: SIGNIFICANT CHANGE UP
-  GENTAMICIN: SIGNIFICANT CHANGE UP
-  LEVOFLOXACIN: SIGNIFICANT CHANGE UP
-  MEROPENEM: SIGNIFICANT CHANGE UP
-  PIPERACILLIN/TAZOBACTAM: SIGNIFICANT CHANGE UP
-  TETRACYCLINE: SIGNIFICANT CHANGE UP
-  TOBRAMYCIN: SIGNIFICANT CHANGE UP
-  TRIMETHOPRIM/SULFAMETHOXAZOLE: SIGNIFICANT CHANGE UP
-  VANCOMYCIN: SIGNIFICANT CHANGE UP
ANION GAP SERPL CALC-SCNC: 20 MMOL/L — HIGH (ref 5–17)
BUN SERPL-MCNC: 59 MG/DL — HIGH (ref 7–23)
CALCIUM SERPL-MCNC: 9.4 MG/DL — SIGNIFICANT CHANGE UP (ref 8.4–10.5)
CHLORIDE SERPL-SCNC: 96 MMOL/L — SIGNIFICANT CHANGE UP (ref 96–108)
CO2 SERPL-SCNC: 22 MMOL/L — SIGNIFICANT CHANGE UP (ref 22–31)
CREAT SERPL-MCNC: 7.12 MG/DL — HIGH (ref 0.5–1.3)
EGFR: 9 ML/MIN/1.73M2 — LOW
GLUCOSE BLDC GLUCOMTR-MCNC: 108 MG/DL — HIGH (ref 70–99)
GLUCOSE BLDC GLUCOMTR-MCNC: 126 MG/DL — HIGH (ref 70–99)
GLUCOSE BLDC GLUCOMTR-MCNC: 149 MG/DL — HIGH (ref 70–99)
GLUCOSE BLDC GLUCOMTR-MCNC: 187 MG/DL — HIGH (ref 70–99)
GLUCOSE SERPL-MCNC: 97 MG/DL — SIGNIFICANT CHANGE UP (ref 70–99)
HCT VFR BLD CALC: 30 % — LOW (ref 39–50)
HGB BLD-MCNC: 9.4 G/DL — LOW (ref 13–17)
MCHC RBC-ENTMCNC: 30 PG — SIGNIFICANT CHANGE UP (ref 27–34)
MCHC RBC-ENTMCNC: 31.3 GM/DL — LOW (ref 32–36)
MCV RBC AUTO: 95.8 FL — SIGNIFICANT CHANGE UP (ref 80–100)
METHOD TYPE: SIGNIFICANT CHANGE UP
METHOD TYPE: SIGNIFICANT CHANGE UP
NRBC # BLD: 0 /100 WBCS — SIGNIFICANT CHANGE UP (ref 0–0)
PLATELET # BLD AUTO: 199 K/UL — SIGNIFICANT CHANGE UP (ref 150–400)
POTASSIUM SERPL-MCNC: 4.7 MMOL/L — SIGNIFICANT CHANGE UP (ref 3.5–5.3)
POTASSIUM SERPL-SCNC: 4.7 MMOL/L — SIGNIFICANT CHANGE UP (ref 3.5–5.3)
RBC # BLD: 3.13 M/UL — LOW (ref 4.2–5.8)
RBC # FLD: 14.9 % — HIGH (ref 10.3–14.5)
SODIUM SERPL-SCNC: 138 MMOL/L — SIGNIFICANT CHANGE UP (ref 135–145)
WBC # BLD: 5.92 K/UL — SIGNIFICANT CHANGE UP (ref 3.8–10.5)
WBC # FLD AUTO: 5.92 K/UL — SIGNIFICANT CHANGE UP (ref 3.8–10.5)

## 2022-04-23 PROCEDURE — 99232 SBSQ HOSP IP/OBS MODERATE 35: CPT | Mod: GC

## 2022-04-23 RX ORDER — ERYTHROPOIETIN 10000 [IU]/ML
3000 INJECTION, SOLUTION INTRAVENOUS; SUBCUTANEOUS
Refills: 0 | Status: DISCONTINUED | OUTPATIENT
Start: 2022-04-23 | End: 2022-04-26

## 2022-04-23 RX ADMIN — CLOPIDOGREL BISULFATE 75 MILLIGRAM(S): 75 TABLET, FILM COATED ORAL at 11:43

## 2022-04-23 RX ADMIN — LISINOPRIL 2.5 MILLIGRAM(S): 2.5 TABLET ORAL at 05:14

## 2022-04-23 RX ADMIN — ATORVASTATIN CALCIUM 40 MILLIGRAM(S): 80 TABLET, FILM COATED ORAL at 21:48

## 2022-04-23 RX ADMIN — HEPARIN SODIUM 5000 UNIT(S): 5000 INJECTION INTRAVENOUS; SUBCUTANEOUS at 21:50

## 2022-04-23 RX ADMIN — HEPARIN SODIUM 5000 UNIT(S): 5000 INJECTION INTRAVENOUS; SUBCUTANEOUS at 08:59

## 2022-04-23 RX ADMIN — CILOSTAZOL 50 MILLIGRAM(S): 100 TABLET ORAL at 08:58

## 2022-04-23 RX ADMIN — ERTAPENEM SODIUM 100 MILLIGRAM(S): 1 INJECTION, POWDER, LYOPHILIZED, FOR SOLUTION INTRAMUSCULAR; INTRAVENOUS at 11:44

## 2022-04-23 RX ADMIN — ERYTHROPOIETIN 3000 UNIT(S): 10000 INJECTION, SOLUTION INTRAVENOUS; SUBCUTANEOUS at 14:19

## 2022-04-23 RX ADMIN — CARVEDILOL PHOSPHATE 6.25 MILLIGRAM(S): 80 CAPSULE, EXTENDED RELEASE ORAL at 08:58

## 2022-04-23 RX ADMIN — Medication 81 MILLIGRAM(S): at 11:44

## 2022-04-23 RX ADMIN — CARVEDILOL PHOSPHATE 6.25 MILLIGRAM(S): 80 CAPSULE, EXTENDED RELEASE ORAL at 21:48

## 2022-04-23 RX ADMIN — CILOSTAZOL 50 MILLIGRAM(S): 100 TABLET ORAL at 21:48

## 2022-04-23 NOTE — PROGRESS NOTE ADULT - ASSESSMENT
50yo M w/ right foot 2nd digit wound to bone   - Pt seen and evaluated  - Afebrile, WBC 9.9, ESR 69, CRP 50  - Right foot distal 2nd digit wound w/ bone exposed to level of IPJ, necrotic wound bed w/ no wet conversion   - Ordered R foot MR w/ and w/o contrast for surgical planning- if AICD compatible   - Right foot wound culture pending, cont IV abx, ID recs appreciated   - Vasc appreciated, RLE arterial duplex performed to assess stent patency , awaiting recs  - Pod plan for Right foot partial 2nd ray resection on Monday 4/25 at 1230pm w/ Dr. Golden pending final vasc recs/ clearance  - Cardiac clearance documented, appreciated  -  AICD interrogated, appreciated  - discussed w/ attending

## 2022-04-23 NOTE — PROGRESS NOTE ADULT - ASSESSMENT
pt w/ infected r second toe likely needing amputation on monday  vasc studies noted  vasc/ pod eval appreciated   cards clearance noted    HD as per renal    DM  continue finger sticks with short acting insulin sliding scale  no oral meds    gi dvt proph

## 2022-04-23 NOTE — PROGRESS NOTE ADULT - ASSESSMENT
left foot ulcer   on anbx  plan for toe amputation   fu with podiatry and vascular surgery     CAD s/p cabg  stable   asa, statin    chronic systolic chf  stable  improving LV function   cont BB / ace  s/p ICD  interrogation noted     ESRD  On HD. follow up with renal. Monitor electrolytes, K, ca. Avoid significant nephrotoxic medications.    DM II  Monitor finger stick. Insulin coverage. Diabetic education and Diabetic diet. Consider nutrition consultation.    Pre-Operative Cardiac Risk Stratification and Optimization  Based on patient history and physical exam, the patient is considered to have elevated risk   recent echo in my office last week showed EF 50 percent   pt has no active or symptomatic cardiac issues to preclude low risk procedures   can proceed with acceptable elevated risk for this urgent procedure   ICD is MRI compatible

## 2022-04-23 NOTE — PROGRESS NOTE ADULT - ATTENDING COMMENTS
Lola Madrid MD  Off: [phone]  contact me on teams    (After 5 pm or on weekends please page the on-call fellow/attending, can check [email or web address] for schedule. Login is lacey cancino, schedule under [facility] medicine, psych, derm)

## 2022-04-23 NOTE — PROGRESS NOTE ADULT - SUBJECTIVE AND OBJECTIVE BOX
DATE OF SERVICE: 04-23-22 @ 13:06  CHIEF COMPLAINT:Patient is a 51y old  Male who presents with a chief complaint of R 2nd toe wound (21 Apr 2022 18:11)    	        PAST MEDICAL & SURGICAL HISTORY:  Diabetes mellitus  type 2    Foot ulcer due to secondary DM    Coronary artery disease    Acute on chronic systolic heart failure    Breast Reduction  at age 18    Toe amputation status, left    S/P CABG (coronary artery bypass graft)    AICD (automatic cardioverter/defibrillator) present            REVIEW OF SYSTEMS:    RESPIRATORY: No cough, wheezing, chills or hemoptysis; No Shortness of Breath  CARDIOVASCULAR: No chest pain, palpitations, passing out, dizziness, or leg swelling  GASTROINTESTINAL: No abdominal or epigastric pain. No nausea, vomiting, or hematemesis; No diarrhea or constipation. No melena or hematochezia.  GENITOURINARY: No dysuria, frequency, hematuria, or incontinence  NEUROLOGICAL: No headaches, memory loss, loss of strength, numbness, or tremors    MUSCULOSKELETAL: No joint pain or swelling; No muscle, back, or extremity pain    Medications:  MEDICATIONS  (STANDING):  aspirin  chewable 81 milliGRAM(s) Oral daily  atorvastatin 40 milliGRAM(s) Oral at bedtime  carvedilol 6.25 milliGRAM(s) Oral every 12 hours  cilostazol 50 milliGRAM(s) Oral every 12 hours  clopidogrel Tablet 75 milliGRAM(s) Oral daily  dextrose 5%. 1000 milliLiter(s) (50 mL/Hr) IV Continuous <Continuous>  dextrose 5%. 1000 milliLiter(s) (100 mL/Hr) IV Continuous <Continuous>  dextrose 50% Injectable 25 Gram(s) IV Push once  dextrose 50% Injectable 12.5 Gram(s) IV Push once  dextrose 50% Injectable 25 Gram(s) IV Push once  epoetin arian-epbx (RETACRIT) Injectable 3000 Unit(s) IV Push <User Schedule>  ertapenem  IVPB      ertapenem  IVPB 500 milliGRAM(s) IV Intermittent every 24 hours  glucagon  Injectable 1 milliGRAM(s) IntraMuscular once  heparin   Injectable 5000 Unit(s) SubCutaneous every 12 hours  insulin lispro (ADMELOG) corrective regimen sliding scale   SubCutaneous three times a day before meals  lisinopril 2.5 milliGRAM(s) Oral daily    MEDICATIONS  (PRN):  dextrose Oral Gel 15 Gram(s) Oral once PRN Blood Glucose LESS THAN 70 milliGRAM(s)/deciliter    	    PHYSICAL EXAM:  T(C): 36.3 (04-23-22 @ 12:39), Max: 37.1 (04-22-22 @ 13:53)  HR: 79 (04-23-22 @ 12:39) (79 - 93)  BP: 119/58 (04-23-22 @ 12:39) (101/59 - 122/68)  RR: 18 (04-23-22 @ 12:39) (18 - 18)  SpO2: 98% (04-23-22 @ 12:39) (97% - 98%)  Wt(kg): --  I&O's Summary    22 Apr 2022 07:01  -  23 Apr 2022 07:00  --------------------------------------------------------  IN: 650 mL / OUT: 0 mL / NET: 650 mL        Appearance: Normal	  HEENT:   Normal oral mucosa, PERRL, EOMI	  Lymphatic: No lymphadenopathy  Cardiovascular: Normal S1 S2, No JVD,  Respiratory: Lungs clear to auscultation	  Psychiatry: A & O x 3, Mood & affect appropriate  Gastrointestinal:  Soft, Non-tender, + BS	    Neurologic: Non-focal  Extremities: foot dressed    TELEMETRY: 	    ECG:  	  RADIOLOGY:  OTHER: 	  	  LABS:	 	    CARDIAC MARKERS:                                9.4    5.92  )-----------( 199      ( 23 Apr 2022 07:15 )             30.0     04-23    138  |  96  |  59<H>  ----------------------------<  97  4.7   |  22  |  7.12<H>    Ca    9.4      23 Apr 2022 07:05    TPro  7.3  /  Alb  3.6  /  TBili  0.3  /  DBili  x   /  AST  10  /  ALT  10  /  AlkPhos  92  04-22    proBNP:   Lipid Profile:   HgA1c:   TSH:

## 2022-04-23 NOTE — PROGRESS NOTE ADULT - SUBJECTIVE AND OBJECTIVE BOX
DATE OF SERVICE: 04-23-22 @ 08:39    Subjective: Patient seen and examined. No new events except as noted.     SUBJECTIVE/ROS:  No chest pain, dyspnea, palpitation, or dizziness.       MEDICATIONS:  MEDICATIONS  (STANDING):  aspirin  chewable 81 milliGRAM(s) Oral daily  atorvastatin 40 milliGRAM(s) Oral at bedtime  carvedilol 6.25 milliGRAM(s) Oral every 12 hours  cilostazol 50 milliGRAM(s) Oral every 12 hours  clopidogrel Tablet 75 milliGRAM(s) Oral daily  dextrose 5%. 1000 milliLiter(s) (50 mL/Hr) IV Continuous <Continuous>  dextrose 5%. 1000 milliLiter(s) (100 mL/Hr) IV Continuous <Continuous>  dextrose 50% Injectable 25 Gram(s) IV Push once  dextrose 50% Injectable 12.5 Gram(s) IV Push once  dextrose 50% Injectable 25 Gram(s) IV Push once  ertapenem  IVPB      ertapenem  IVPB 500 milliGRAM(s) IV Intermittent every 24 hours  glucagon  Injectable 1 milliGRAM(s) IntraMuscular once  heparin   Injectable 5000 Unit(s) SubCutaneous every 12 hours  insulin lispro (ADMELOG) corrective regimen sliding scale   SubCutaneous three times a day before meals  lisinopril 2.5 milliGRAM(s) Oral daily      PHYSICAL EXAM:  T(C): 36.6 (04-23-22 @ 04:46), Max: 37.1 (04-22-22 @ 13:53)  HR: 85 (04-23-22 @ 04:46) (85 - 93)  BP: 101/59 (04-23-22 @ 04:46) (101/59 - 122/68)  RR: 18 (04-23-22 @ 04:46) (18 - 18)  SpO2: 97% (04-23-22 @ 04:46) (97% - 97%)  Wt(kg): --  I&O's Summary    22 Apr 2022 07:01  -  23 Apr 2022 07:00  --------------------------------------------------------  IN: 650 mL / OUT: 0 mL / NET: 650 mL            JVP: Normal  Neck: supple  Lung: clear   CV: S1 S2 , Murmur:  Abd: soft  Ext: No edema  neuro: Awake / alert  Psych: flat affect  Skin: normal``    LABS/DATA:    CARDIAC MARKERS:                                9.4    5.92  )-----------( 199      ( 23 Apr 2022 07:15 )             30.0     04-23    138  |  96  |  59<H>  ----------------------------<  97  4.7   |  22  |  7.12<H>    Ca    9.4      23 Apr 2022 07:05    TPro  7.3  /  Alb  3.6  /  TBili  0.3  /  DBili  x   /  AST  10  /  ALT  10  /  AlkPhos  92  04-22    proBNP:   Lipid Profile:   HgA1c:   TSH:     TELE:  EKG:

## 2022-04-23 NOTE — PROGRESS NOTE ADULT - SUBJECTIVE AND OBJECTIVE BOX
[facility] Division of Kidney Diseases & Hypertension  HEMODIALYSIS NOTE  --------------------------------------------------------------------------------  Chief Complaint: ESRD/Ongoing hemodialysis requirement    24 hour events/subjective:    pt denies new complaints    PAST HISTORY  --------------------------------------------------------------------------------  No significant changes to PMH, PSH, FHx, SHx, unless otherwise noted    ALLERGIES & MEDICATIONS  --------------------------------------------------------------------------------  Allergies    No Known Allergies    Intolerances      Standing Inpatient Medications  aspirin  chewable 81 milliGRAM(s) Oral daily  atorvastatin 40 milliGRAM(s) Oral at bedtime  carvedilol 6.25 milliGRAM(s) Oral every 12 hours  cilostazol 50 milliGRAM(s) Oral every 12 hours  clopidogrel Tablet 75 milliGRAM(s) Oral daily  dextrose 5%. 1000 milliLiter(s) IV Continuous <Continuous>  dextrose 5%. 1000 milliLiter(s) IV Continuous <Continuous>  dextrose 50% Injectable 25 Gram(s) IV Push once  dextrose 50% Injectable 12.5 Gram(s) IV Push once  dextrose 50% Injectable 25 Gram(s) IV Push once  ertapenem  IVPB      ertapenem  IVPB 500 milliGRAM(s) IV Intermittent every 24 hours  glucagon  Injectable 1 milliGRAM(s) IntraMuscular once  heparin   Injectable 5000 Unit(s) SubCutaneous every 12 hours  insulin lispro (ADMELOG) corrective regimen sliding scale   SubCutaneous three times a day before meals  lisinopril 2.5 milliGRAM(s) Oral daily    PRN Inpatient Medications  dextrose Oral Gel 15 Gram(s) Oral once PRN      REVIEW OF SYSTEMS  --------------------------------------------------------------------------------    All other systems were reviewed and are negative, except as noted.    VITALS/PHYSICAL EXAM  --------------------------------------------------------------------------------  T(C): 36.6 (04-23-22 @ 04:46), Max: 37.1 (04-22-22 @ 13:53)  HR: 85 (04-23-22 @ 04:46) (85 - 93)  BP: 101/59 (04-23-22 @ 04:46) (101/59 - 122/68)  RR: 18 (04-23-22 @ 04:46) (18 - 18)  SpO2: 97% (04-23-22 @ 04:46) (97% - 97%)  Wt(kg): --  Height (cm): 175.3 (04-21-22 @ 20:45)      04-22-22 @ 07:01  -  04-23-22 @ 07:00  --------------------------------------------------------  IN: 650 mL / OUT: 0 mL / NET: 650 mL      Physical Exam:  	Gen: NAD  	Pulm: CTA B/L  	CV: RRR, S1S2  	Abd: +BS, soft,  	UE: Warm  	LE: Warm, edema  	Neuro: No focal deficits,   	Psych: Normal affect and mood  	Skin: Warm, without rashes  	Vascular access: left avf     LABS/STUDIES  --------------------------------------------------------------------------------              9.4    5.92  >-----------<  199      [04-23-22 @ 07:15]              30.0     138  |  96  |  59  ----------------------------<  97      [04-23-22 @ 07:05]  4.7   |  22  |  7.12        Ca     9.4     [04-23-22 @ 07:05]    TPro  7.3  /  Alb  3.6  /  TBili  0.3  /  DBili  x   /  AST  10  /  ALT  10  /  AlkPhos  92  [04-22-22 @ 06:58]

## 2022-04-24 ENCOUNTER — TRANSCRIPTION ENCOUNTER (OUTPATIENT)
Age: 52
End: 2022-04-24

## 2022-04-24 LAB
ANION GAP SERPL CALC-SCNC: 17 MMOL/L — SIGNIFICANT CHANGE UP (ref 5–17)
BUN SERPL-MCNC: 39 MG/DL — HIGH (ref 7–23)
CALCIUM SERPL-MCNC: 9.3 MG/DL — SIGNIFICANT CHANGE UP (ref 8.4–10.5)
CHLORIDE SERPL-SCNC: 97 MMOL/L — SIGNIFICANT CHANGE UP (ref 96–108)
CO2 SERPL-SCNC: 25 MMOL/L — SIGNIFICANT CHANGE UP (ref 22–31)
CREAT SERPL-MCNC: 5.36 MG/DL — HIGH (ref 0.5–1.3)
EGFR: 12 ML/MIN/1.73M2 — LOW
GLUCOSE BLDC GLUCOMTR-MCNC: 124 MG/DL — HIGH (ref 70–99)
GLUCOSE BLDC GLUCOMTR-MCNC: 137 MG/DL — HIGH (ref 70–99)
GLUCOSE BLDC GLUCOMTR-MCNC: 137 MG/DL — HIGH (ref 70–99)
GLUCOSE BLDC GLUCOMTR-MCNC: 139 MG/DL — HIGH (ref 70–99)
GLUCOSE SERPL-MCNC: 149 MG/DL — HIGH (ref 70–99)
HCT VFR BLD CALC: 29.7 % — LOW (ref 39–50)
HGB BLD-MCNC: 9.4 G/DL — LOW (ref 13–17)
MCHC RBC-ENTMCNC: 29.8 PG — SIGNIFICANT CHANGE UP (ref 27–34)
MCHC RBC-ENTMCNC: 31.6 GM/DL — LOW (ref 32–36)
MCV RBC AUTO: 94.3 FL — SIGNIFICANT CHANGE UP (ref 80–100)
NRBC # BLD: 0 /100 WBCS — SIGNIFICANT CHANGE UP (ref 0–0)
PLATELET # BLD AUTO: 201 K/UL — SIGNIFICANT CHANGE UP (ref 150–400)
POTASSIUM SERPL-MCNC: 4.3 MMOL/L — SIGNIFICANT CHANGE UP (ref 3.5–5.3)
POTASSIUM SERPL-SCNC: 4.3 MMOL/L — SIGNIFICANT CHANGE UP (ref 3.5–5.3)
RAPID RVP RESULT: SIGNIFICANT CHANGE UP
RBC # BLD: 3.15 M/UL — LOW (ref 4.2–5.8)
RBC # FLD: 15.2 % — HIGH (ref 10.3–14.5)
SARS-COV-2 RNA SPEC QL NAA+PROBE: SIGNIFICANT CHANGE UP
SODIUM SERPL-SCNC: 139 MMOL/L — SIGNIFICANT CHANGE UP (ref 135–145)
WBC # BLD: 5.5 K/UL — SIGNIFICANT CHANGE UP (ref 3.8–10.5)
WBC # FLD AUTO: 5.5 K/UL — SIGNIFICANT CHANGE UP (ref 3.8–10.5)

## 2022-04-24 PROCEDURE — 71046 X-RAY EXAM CHEST 2 VIEWS: CPT | Mod: 26

## 2022-04-24 PROCEDURE — 99232 SBSQ HOSP IP/OBS MODERATE 35: CPT

## 2022-04-24 RX ORDER — VANCOMYCIN HCL 1 G
500 VIAL (EA) INTRAVENOUS ONCE
Refills: 0 | Status: COMPLETED | OUTPATIENT
Start: 2022-04-24 | End: 2022-04-24

## 2022-04-24 RX ADMIN — CILOSTAZOL 50 MILLIGRAM(S): 100 TABLET ORAL at 09:59

## 2022-04-24 RX ADMIN — LISINOPRIL 2.5 MILLIGRAM(S): 2.5 TABLET ORAL at 09:59

## 2022-04-24 RX ADMIN — CILOSTAZOL 50 MILLIGRAM(S): 100 TABLET ORAL at 21:23

## 2022-04-24 RX ADMIN — Medication 81 MILLIGRAM(S): at 13:17

## 2022-04-24 RX ADMIN — ATORVASTATIN CALCIUM 40 MILLIGRAM(S): 80 TABLET, FILM COATED ORAL at 21:22

## 2022-04-24 RX ADMIN — CLOPIDOGREL BISULFATE 75 MILLIGRAM(S): 75 TABLET, FILM COATED ORAL at 13:16

## 2022-04-24 RX ADMIN — Medication 100 MILLIGRAM(S): at 13:17

## 2022-04-24 RX ADMIN — CARVEDILOL PHOSPHATE 6.25 MILLIGRAM(S): 80 CAPSULE, EXTENDED RELEASE ORAL at 09:59

## 2022-04-24 RX ADMIN — HEPARIN SODIUM 5000 UNIT(S): 5000 INJECTION INTRAVENOUS; SUBCUTANEOUS at 10:00

## 2022-04-24 RX ADMIN — HEPARIN SODIUM 5000 UNIT(S): 5000 INJECTION INTRAVENOUS; SUBCUTANEOUS at 21:23

## 2022-04-24 RX ADMIN — ERTAPENEM SODIUM 100 MILLIGRAM(S): 1 INJECTION, POWDER, LYOPHILIZED, FOR SOLUTION INTRAMUSCULAR; INTRAVENOUS at 13:17

## 2022-04-24 NOTE — PROGRESS NOTE ADULT - SUBJECTIVE AND OBJECTIVE BOX
DATE OF SERVICE: 04-24-22 @ 09:42    Subjective: Patient seen and examined. No new events except as noted.     SUBJECTIVE/ROS:  feels ok     MEDICATIONS:  MEDICATIONS  (STANDING):  aspirin  chewable 81 milliGRAM(s) Oral daily  atorvastatin 40 milliGRAM(s) Oral at bedtime  carvedilol 6.25 milliGRAM(s) Oral every 12 hours  cilostazol 50 milliGRAM(s) Oral every 12 hours  clopidogrel Tablet 75 milliGRAM(s) Oral daily  dextrose 5%. 1000 milliLiter(s) (50 mL/Hr) IV Continuous <Continuous>  dextrose 5%. 1000 milliLiter(s) (100 mL/Hr) IV Continuous <Continuous>  dextrose 50% Injectable 25 Gram(s) IV Push once  dextrose 50% Injectable 12.5 Gram(s) IV Push once  dextrose 50% Injectable 25 Gram(s) IV Push once  epoetin arian-epbx (RETACRIT) Injectable 3000 Unit(s) IV Push <User Schedule>  ertapenem  IVPB      ertapenem  IVPB 500 milliGRAM(s) IV Intermittent every 24 hours  glucagon  Injectable 1 milliGRAM(s) IntraMuscular once  heparin   Injectable 5000 Unit(s) SubCutaneous every 12 hours  insulin lispro (ADMELOG) corrective regimen sliding scale   SubCutaneous three times a day before meals  lisinopril 2.5 milliGRAM(s) Oral daily      PHYSICAL EXAM:  T(C): 36.7 (04-24-22 @ 07:56), Max: 37.2 (04-23-22 @ 23:57)  HR: 61 (04-24-22 @ 07:56) (61 - 98)  BP: 109/66 (04-24-22 @ 07:56) (92/54 - 119/58)  RR: 18 (04-24-22 @ 07:56) (18 - 18)  SpO2: 96% (04-24-22 @ 07:56) (96% - 99%)  Wt(kg): --  I&O's Summary    23 Apr 2022 07:01  -  24 Apr 2022 07:00  --------------------------------------------------------  IN: 0 mL / OUT: 3000 mL / NET: -3000 mL            JVP: Normal  Neck: supple  Lung: clear   CV: S1 S2 , Murmur:  Abd: soft  Ext: No edema  neuro: Awake  Psych: flat affect  Skin: normal``    LABS/DATA:    CARDIAC MARKERS:                                9.4    5.50  )-----------( 201      ( 24 Apr 2022 07:13 )             29.7     04-24    139  |  97  |  39<H>  ----------------------------<  149<H>  4.3   |  25  |  5.36<H>    Ca    9.3      24 Apr 2022 07:09      proBNP:   Lipid Profile:   HgA1c:   TSH:     TELE:  EKG:

## 2022-04-24 NOTE — PROGRESS NOTE ADULT - ASSESSMENT
50yo M w/ right foot 2nd digit wound to bone   - Pt seen and evaluated  - Afebrile, WBC 5.5  - Right foot distal 2nd digit wound w/ bone exposed to level of IPJ, necrotic wound bed w/ no wet conversion   - Ordered R foot MR w/ and w/o contrast for surgical planning- if AICD compatible   - Right foot wound culture pending, cont IV abx, ID recs appreciated   - Pod plan for Right foot partial 2nd ray resection on Monday 4/25 at 1230pm w/ Dr. Golden  - NPO after midnight  - please repeat COVID swab prior to procedure   - Vasc recs appreciated, no intervention planned  - Cardiac/Med clearance documented, appreciated  -  AICD interrogated, appreciated  - discussed w/ attending

## 2022-04-24 NOTE — PROGRESS NOTE ADULT - SUBJECTIVE AND OBJECTIVE BOX
Patient is a 51y old  Male who presents with a chief complaint of R 2nd toe wound (21 Apr 2022 18:11)    Being followed by ID for        Interval history:  No other acute events      ROS:  No cough,SOB,CP  No N/V/D  No abd pain  No urinary complaints  No HA  No joint or limb pain  No other complaints    PAST MEDICAL & SURGICAL HISTORY:  Diabetes mellitus  type 2    Foot ulcer due to secondary DM    Coronary artery disease    Acute on chronic systolic heart failure    Breast Reduction  at age 18    Toe amputation status, left    S/P CABG (coronary artery bypass graft)    AICD (automatic cardioverter/defibrillator) present      Allergies    No Known Allergies    Intolerances      Antimicrobials:    ertapenem  IVPB      ertapenem  IVPB 500 milliGRAM(s) IV Intermittent every 24 hours    MEDICATIONS  (STANDING):  aspirin  chewable 81 milliGRAM(s) Oral daily  atorvastatin 40 milliGRAM(s) Oral at bedtime  carvedilol 6.25 milliGRAM(s) Oral every 12 hours  cilostazol 50 milliGRAM(s) Oral every 12 hours  clopidogrel Tablet 75 milliGRAM(s) Oral daily  dextrose 5%. 1000 milliLiter(s) (50 mL/Hr) IV Continuous <Continuous>  dextrose 5%. 1000 milliLiter(s) (100 mL/Hr) IV Continuous <Continuous>  dextrose 50% Injectable 25 Gram(s) IV Push once  dextrose 50% Injectable 12.5 Gram(s) IV Push once  dextrose 50% Injectable 25 Gram(s) IV Push once  epoetin arian-epbx (RETACRIT) Injectable 3000 Unit(s) IV Push <User Schedule>  ertapenem  IVPB      ertapenem  IVPB 500 milliGRAM(s) IV Intermittent every 24 hours  glucagon  Injectable 1 milliGRAM(s) IntraMuscular once  heparin   Injectable 5000 Unit(s) SubCutaneous every 12 hours  insulin lispro (ADMELOG) corrective regimen sliding scale   SubCutaneous three times a day before meals  lisinopril 2.5 milliGRAM(s) Oral daily      Vital Signs Last 24 Hrs  T(C): 36.7 (04-24-22 @ 07:56), Max: 37.2 (04-23-22 @ 23:57)  T(F): 98 (04-24-22 @ 07:56), Max: 98.9 (04-23-22 @ 23:57)  HR: 61 (04-24-22 @ 07:56) (61 - 98)  BP: 109/66 (04-24-22 @ 07:56) (92/54 - 119/58)  BP(mean): --  RR: 18 (04-24-22 @ 07:56) (18 - 18)  SpO2: 96% (04-24-22 @ 07:56) (96% - 99%)    Physical Exam:    Constitutional well preserved,comfortable,pleasant    HEENT PERRLA EOMI,No pallor or icterus    No oral exudate or erythema    Neck supple no JVD or LN    Chest Good AE,CTA    CVS RRR S1 S2 WNl No murmur or rub or gallop    Abd soft BS normal No tenderness no masses    Ext No cyanosis clubbing or edema    IV site no erythema tenderness or discharge    Joints no swelling or LOM    CNS AAO X 3 no focal    Lab Data:                          9.4    5.50  )-----------( 201      ( 24 Apr 2022 07:13 )             29.7       04-24    139  |  97  |  39<H>  ----------------------------<  149<H>  4.3   |  25  |  5.36<H>    Ca    9.3      24 Apr 2022 07:09            .Abscess R foot wound  04-21-22   Few Leclercia adecarboxylata  Few Enterococcus faecalis  Few Corynebacterium species "Susceptibilities not performed"  --  Leclercia adecarboxylata  Enterococcus faecalis    Culture - Abscess with Gram Stain (04.21.22 @ 16:34)    -  Amikacin: S <=16    -  Amoxicillin/Clavulanic Acid: S <=8/4    -  Ampicillin: S <=8 These ampicillin results predict results for amoxicillin    -  Ampicillin: S <=2 Predicts results to ampicillin/sulbactam, amoxacillin-clavulanate and  piperacillin-tazobactam.    -  Ampicillin/Sulbactam: S <=4/2 Enterobacter, Klebsiella aerogenes, Citrobacter, and Serratia may develop resistance during prolonged therapy (3-4 days)    -  Aztreonam: S <=4    -  Cefazolin: S <=2 Enterobacter, Klebsiella aerogenes, Citrobacter, and Serratia may develop resistance during prolonged therapy (3-4 days)    -  Cefepime: S <=2    -  Cefoxitin: S <=8    -  Ceftriaxone: S <=1 Enterobacter, Klebsiella aerogenes, Citrobacter, and Serratia may develop resistance during prolonged therapy    -  Ciprofloxacin: S <=0.25    -  Ertapenem: S <=0.5    -  Gentamicin: S <=2    -  Levofloxacin: S <=0.5    -  Meropenem: S <=1    -  Piperacillin/Tazobactam: S <=8    -  Tetra/Doxy: S <=1    -  Tobramycin: S <=2    -  Trimethoprim/Sulfamethoxazole: S <=0.5/9.5    -  Vancomycin: S 2    Specimen Source: .Abscess R foot wound    Culture Results:   Few Leclercia adeandreaylata  Few Enterococcus faecalis  Few Corynebacterium species "Susceptibilities not performed"    Organism Identification: Arina howard  Enterococcus faecalis    Organism: Enterococcus faecalis    Organism: Arina howard    Method Type: RUBY    Method Type: RUBY                    WBC Count: 5.50 (04-24-22 @ 07:13)  WBC Count: 5.92 (04-23-22 @ 07:15)  WBC Count: 6.70 (04-22-22 @ 07:02)  WBC Count: 9.90 (04-21-22 @ 10:53)             Patient is a 51y old  Male who presents with a chief complaint of R 2nd toe wound (21 Apr 2022 18:11)    Being followed by ID for right second toe wound        Interval history:  pt resting quietly  tolerating the antibiotics  No other acute events        PAST MEDICAL & SURGICAL HISTORY:  Diabetes mellitus  type 2    Foot ulcer due to secondary DM    Coronary artery disease    Acute on chronic systolic heart failure    Breast Reduction  at age 18    Toe amputation status, left    S/P CABG (coronary artery bypass graft)    AICD (automatic cardioverter/defibrillator) present      Allergies    No Known Allergies    Intolerances      Antimicrobials:    ertapenem  IVPB      ertapenem  IVPB 500 milliGRAM(s) IV Intermittent every 24 hours    MEDICATIONS  (STANDING):  aspirin  chewable 81 milliGRAM(s) Oral daily  atorvastatin 40 milliGRAM(s) Oral at bedtime  carvedilol 6.25 milliGRAM(s) Oral every 12 hours  cilostazol 50 milliGRAM(s) Oral every 12 hours  clopidogrel Tablet 75 milliGRAM(s) Oral daily  dextrose 5%. 1000 milliLiter(s) (50 mL/Hr) IV Continuous <Continuous>  dextrose 5%. 1000 milliLiter(s) (100 mL/Hr) IV Continuous <Continuous>  dextrose 50% Injectable 25 Gram(s) IV Push once  dextrose 50% Injectable 12.5 Gram(s) IV Push once  dextrose 50% Injectable 25 Gram(s) IV Push once  epoetin arian-epbx (RETACRIT) Injectable 3000 Unit(s) IV Push <User Schedule>  ertapenem  IVPB      ertapenem  IVPB 500 milliGRAM(s) IV Intermittent every 24 hours  glucagon  Injectable 1 milliGRAM(s) IntraMuscular once  heparin   Injectable 5000 Unit(s) SubCutaneous every 12 hours  insulin lispro (ADMELOG) corrective regimen sliding scale   SubCutaneous three times a day before meals  lisinopril 2.5 milliGRAM(s) Oral daily      Vital Signs Last 24 Hrs  T(C): 36.7 (04-24-22 @ 07:56), Max: 37.2 (04-23-22 @ 23:57)  T(F): 98 (04-24-22 @ 07:56), Max: 98.9 (04-23-22 @ 23:57)  HR: 61 (04-24-22 @ 07:56) (61 - 98)  BP: 109/66 (04-24-22 @ 07:56) (92/54 - 119/58)  BP(mean): --  RR: 18 (04-24-22 @ 07:56) (18 - 18)  SpO2: 96% (04-24-22 @ 07:56) (96% - 99%)    Physical Exam:    Constitutional well preserved,comfortable,pleasant    HEENT PERRLA EOMI,No pallor or icterus    No oral exudate or erythema    Neck supple no JVD or LN    Chest Good AE,CTA    CVS  S1 S2     Abd soft BS normal No tenderness     Ext right foot dressed     IV site no erythema tenderness or discharge    Joints no swelling or LOM    CNS AAO X 3 no focal    Lab Data:                          9.4    5.50  )-----------( 201      ( 24 Apr 2022 07:13 )             29.7       04-24    139  |  97  |  39<H>  ----------------------------<  149<H>  4.3   |  25  |  5.36<H>    Ca    9.3      24 Apr 2022 07:09            .Abscess R foot wound  04-21-22   Few Leclercia adecarboxylata  Few Enterococcus faecalis  Few Corynebacterium species "Susceptibilities not performed"  --  Leclercia adecarboxylata  Enterococcus faecalis    Culture - Abscess with Gram Stain (04.21.22 @ 16:34)    -  Amikacin: S <=16    -  Amoxicillin/Clavulanic Acid: S <=8/4    -  Ampicillin: S <=8 These ampicillin results predict results for amoxicillin    -  Ampicillin: S <=2 Predicts results to ampicillin/sulbactam, amoxacillin-clavulanate and  piperacillin-tazobactam.    -  Ampicillin/Sulbactam: S <=4/2 Enterobacter, Klebsiella aerogenes, Citrobacter, and Serratia may develop resistance during prolonged therapy (3-4 days)    -  Aztreonam: S <=4    -  Cefazolin: S <=2 Enterobacter, Klebsiella aerogenes, Citrobacter, and Serratia may develop resistance during prolonged therapy (3-4 days)    -  Cefepime: S <=2    -  Cefoxitin: S <=8    -  Ceftriaxone: S <=1 Enterobacter, Klebsiella aerogenes, Citrobacter, and Serratia may develop resistance during prolonged therapy    -  Ciprofloxacin: S <=0.25    -  Ertapenem: S <=0.5    -  Gentamicin: S <=2    -  Levofloxacin: S <=0.5    -  Meropenem: S <=1    -  Piperacillin/Tazobactam: S <=8    -  Tetra/Doxy: S <=1    -  Tobramycin: S <=2    -  Trimethoprim/Sulfamethoxazole: S <=0.5/9.5    -  Vancomycin: S 2    Specimen Source: .Abscess R foot wound    Culture Results:   Few Jeromecia kevinylata  Few Enterococcus faecalis  Few Corynebacterium species "Susceptibilities not performed"    Organism Identification: Arina howard  Enterococcus faecalis    Organism: Enterococcus faecalis    Organism: Arina howard    Method Type: RUBY    Method Type: RUBY                    WBC Count: 5.50 (04-24-22 @ 07:13)  WBC Count: 5.92 (04-23-22 @ 07:15)  WBC Count: 6.70 (04-22-22 @ 07:02)  WBC Count: 9.90 (04-21-22 @ 10:53)

## 2022-04-24 NOTE — PROGRESS NOTE ADULT - SUBJECTIVE AND OBJECTIVE BOX
Podiatry pager #: [phone] ([place])/ [phone] ([facility]    Patient is a 51y old  Male who presents with a chief complaint of R 2nd toe wound (21 Apr 2022 18:11)       INTERVAL HPI/OVERNIGHT EVENTS:  Patient seen and evaluated at bedside.  Pt is resting comfortable in NAD. Denies N/V/F/C.     Allergies    No Known Allergies    Intolerances        Vital Signs Last 24 Hrs  T(C): 36.7 (24 Apr 2022 07:56), Max: 37.2 (23 Apr 2022 23:57)  T(F): 98 (24 Apr 2022 07:56), Max: 98.9 (23 Apr 2022 23:57)  HR: 61 (24 Apr 2022 07:56) (61 - 98)  BP: 109/66 (24 Apr 2022 07:56) (92/54 - 119/58)  BP(mean): --  RR: 18 (24 Apr 2022 07:56) (18 - 18)  SpO2: 96% (24 Apr 2022 07:56) (96% - 99%)    LABS:                        9.4    5.50  )-----------( 201      ( 24 Apr 2022 07:13 )             29.7     04-24    139  |  97  |  39<H>  ----------------------------<  149<H>  4.3   |  25  |  5.36<H>    Ca    9.3      24 Apr 2022 07:09          CAPILLARY BLOOD GLUCOSE      POCT Blood Glucose.: 187 mg/dL (23 Apr 2022 21:42)  POCT Blood Glucose.: 126 mg/dL (23 Apr 2022 17:41)  POCT Blood Glucose.: 149 mg/dL (23 Apr 2022 12:52)      Lower Extremity Physical Exam:  Vascular: DP/PT 0/4 B/L, CFT <3 seconds B/L, Temperature gradient warm to cool b/l  Neuro: Epicritic sensation absent to the level of digits B/L  Musculoskeletal/Ortho: s/p right foot partial 1st ray resection closed   Derm: Right foot distal 2nd digit wound w/ bone exposed, necrotic tissue bed, malodorous, no purulence, no tracking proximal    RADIOLOGY & ADDITIONAL TESTS:

## 2022-04-24 NOTE — PROVIDER CONTACT NOTE (MEDICATION) - ASSESSMENT
Pt A&Ox4, not complaining of dizziness or faintness, pt having surgery tomorrow
Pt has no complaints of dizziness or faintness. Pt bp is 99/58, has been trending low

## 2022-04-24 NOTE — PROGRESS NOTE ADULT - SUBJECTIVE AND OBJECTIVE BOX
DATE OF SERVICE: 04-24-22 @ 11:27  CHIEF COMPLAINT:Patient is a 51y old  Male who presents with a chief complaint of R 2nd toe wound (21 Apr 2022 18:11)    	        PAST MEDICAL & SURGICAL HISTORY:  Diabetes mellitus  type 2    Foot ulcer due to secondary DM    Coronary artery disease    Acute on chronic systolic heart failure    Breast Reduction  at age 18    Toe amputation status, left    S/P CABG (coronary artery bypass graft)    AICD (automatic cardioverter/defibrillator) present            REVIEW OF SYSTEMS:  CONSTITUTIONAL: No fever, weight loss, or fatigue  EYES: No eye pain, visual disturbances, or discharge  NECK: No pain or stiffness  RESPIRATORY: No cough, wheezing, chills or hemoptysis; No Shortness of Breath  CARDIOVASCULAR: No chest pain, palpitations, passing out,   GASTROINTESTINAL: No abdominal or epigastric pain. No nausea, vomiting, or hematemesis; No diarrhea or constipation. No melena or hematochezia.  GENITOURINARY: No dysuria, frequency, hematuria, or incontinence  NEUROLOGICAL: No headaches,     Medications:  MEDICATIONS  (STANDING):  aspirin  chewable 81 milliGRAM(s) Oral daily  atorvastatin 40 milliGRAM(s) Oral at bedtime  carvedilol 6.25 milliGRAM(s) Oral every 12 hours  cilostazol 50 milliGRAM(s) Oral every 12 hours  clopidogrel Tablet 75 milliGRAM(s) Oral daily  dextrose 5%. 1000 milliLiter(s) (50 mL/Hr) IV Continuous <Continuous>  dextrose 5%. 1000 milliLiter(s) (100 mL/Hr) IV Continuous <Continuous>  dextrose 50% Injectable 25 Gram(s) IV Push once  dextrose 50% Injectable 12.5 Gram(s) IV Push once  dextrose 50% Injectable 25 Gram(s) IV Push once  epoetin arian-epbx (RETACRIT) Injectable 3000 Unit(s) IV Push <User Schedule>  ertapenem  IVPB      ertapenem  IVPB 500 milliGRAM(s) IV Intermittent every 24 hours  glucagon  Injectable 1 milliGRAM(s) IntraMuscular once  heparin   Injectable 5000 Unit(s) SubCutaneous every 12 hours  insulin lispro (ADMELOG) corrective regimen sliding scale   SubCutaneous three times a day before meals  lisinopril 2.5 milliGRAM(s) Oral daily  vancomycin  IVPB 500 milliGRAM(s) IV Intermittent once    MEDICATIONS  (PRN):  dextrose Oral Gel 15 Gram(s) Oral once PRN Blood Glucose LESS THAN 70 milliGRAM(s)/deciliter    	    PHYSICAL EXAM:  T(C): 36.7 (04-24-22 @ 07:56), Max: 37.2 (04-23-22 @ 23:57)  HR: 61 (04-24-22 @ 07:56) (61 - 98)  BP: 109/66 (04-24-22 @ 07:56) (92/54 - 119/58)  RR: 18 (04-24-22 @ 07:56) (18 - 18)  SpO2: 96% (04-24-22 @ 07:56) (96% - 99%)  Wt(kg): --  I&O's Summary    23 Apr 2022 07:01  -  24 Apr 2022 07:00  --------------------------------------------------------  IN: 0 mL / OUT: 3000 mL / NET: -3000 mL        Appearance: Normal	  HEENT:   Normal oral mucosa, PERRL, EOMI	  Lymphatic: No lymphadenopathy  Cardiovascular: Normal S1 S2, No JVD,  Respiratory: Lungs clear to auscultation	  Psychiatry: A & O x 3,  Gastrointestinal:  Soft, Non-tender, + BS	    Neurologic: Non-focal  Extremities: foot dressed  pvd  TELEMETRY: 	    ECG:  	  RADIOLOGY:  OTHER: 	  	  LABS:	 	    CARDIAC MARKERS:                                9.4    5.50  )-----------( 201      ( 24 Apr 2022 07:13 )             29.7     04-24    139  |  97  |  39<H>  ----------------------------<  149<H>  4.3   |  25  |  5.36<H>    Ca    9.3      24 Apr 2022 07:09      proBNP:   Lipid Profile:   HgA1c:   TSH:

## 2022-04-24 NOTE — PROGRESS NOTE ADULT - ASSESSMENT
6o years old with DM, CAD, PVD, CABG, ESRD, history of left great toe amputation (1/22)  presents with right toe ulcer present for about a week. Might be related to him peeling some skin off the toe.  no fever or chills  no systemic signs  the wd is clean but does probe down to bone     pt is growing Few Leclercia adecarboxylata, Few Enterococcus faecalis and  Few Corynebacterium species        6o years old with DM, CAD, PVD, CABG, ESRD, history of left great toe amputation (1/22)  presents with right toe ulcer present for about a week. Might be related to him peeling some skin off the toe.  no fever or chills  no systemic signs  the wd is clean but does probe down to bone     pt is growing Few Leclercia adecarboxylata, Few Enterococcus faecalis and  Few Corynebacterium species   pt is on ertapenem. Will add vancomycin for now and streamline the antibiotics after the surgery     Margarita Gomez M.D. ,   please reach via teams   If no answer, or after 5PM/ weekends,  then please call  [phone]    Assessment and plan discussed with the primary team .

## 2022-04-24 NOTE — PROGRESS NOTE ADULT - ASSESSMENT
pt w/ infected r second toe likely needing amputation on monday  vasc studies noted  vasc/ pod eval appreciated   cards clearance noted    HD as per renal    DM  continue finger sticks with short acting insulin sliding scale  no oral meds    gi dvt proph   medically stable for sx   mri today

## 2022-04-25 ENCOUNTER — RESULT REVIEW (OUTPATIENT)
Age: 52
End: 2022-04-25

## 2022-04-25 ENCOUNTER — TRANSCRIPTION ENCOUNTER (OUTPATIENT)
Age: 52
End: 2022-04-25

## 2022-04-25 LAB
ANION GAP SERPL CALC-SCNC: 19 MMOL/L — HIGH (ref 5–17)
APTT BLD: 33.1 SEC — SIGNIFICANT CHANGE UP (ref 27.5–35.5)
BLD GP AB SCN SERPL QL: NEGATIVE — SIGNIFICANT CHANGE UP
BUN SERPL-MCNC: 66 MG/DL — HIGH (ref 7–23)
CALCIUM SERPL-MCNC: 9.2 MG/DL — SIGNIFICANT CHANGE UP (ref 8.4–10.5)
CHLORIDE SERPL-SCNC: 96 MMOL/L — SIGNIFICANT CHANGE UP (ref 96–108)
CO2 SERPL-SCNC: 23 MMOL/L — SIGNIFICANT CHANGE UP (ref 22–31)
CREAT SERPL-MCNC: 8.44 MG/DL — HIGH (ref 0.5–1.3)
EGFR: 7 ML/MIN/1.73M2 — LOW
GLUCOSE BLDC GLUCOMTR-MCNC: 103 MG/DL — HIGH (ref 70–99)
GLUCOSE BLDC GLUCOMTR-MCNC: 123 MG/DL — HIGH (ref 70–99)
GLUCOSE BLDC GLUCOMTR-MCNC: 131 MG/DL — HIGH (ref 70–99)
GLUCOSE BLDC GLUCOMTR-MCNC: 139 MG/DL — HIGH (ref 70–99)
GLUCOSE BLDC GLUCOMTR-MCNC: 154 MG/DL — HIGH (ref 70–99)
GLUCOSE BLDC GLUCOMTR-MCNC: 154 MG/DL — HIGH (ref 70–99)
GLUCOSE SERPL-MCNC: 125 MG/DL — HIGH (ref 70–99)
GRAM STN FLD: SIGNIFICANT CHANGE UP
HCT VFR BLD CALC: 28 % — LOW (ref 39–50)
HGB BLD-MCNC: 8.8 G/DL — LOW (ref 13–17)
INR BLD: 1.21 RATIO — HIGH (ref 0.88–1.16)
MCHC RBC-ENTMCNC: 29.7 PG — SIGNIFICANT CHANGE UP (ref 27–34)
MCHC RBC-ENTMCNC: 31.4 GM/DL — LOW (ref 32–36)
MCV RBC AUTO: 94.6 FL — SIGNIFICANT CHANGE UP (ref 80–100)
NRBC # BLD: 0 /100 WBCS — SIGNIFICANT CHANGE UP (ref 0–0)
PLATELET # BLD AUTO: 188 K/UL — SIGNIFICANT CHANGE UP (ref 150–400)
POTASSIUM SERPL-MCNC: 4.7 MMOL/L — SIGNIFICANT CHANGE UP (ref 3.5–5.3)
POTASSIUM SERPL-SCNC: 4.7 MMOL/L — SIGNIFICANT CHANGE UP (ref 3.5–5.3)
PROTHROM AB SERPL-ACNC: 13.9 SEC — HIGH (ref 10.5–13.4)
RBC # BLD: 2.96 M/UL — LOW (ref 4.2–5.8)
RBC # FLD: 14.7 % — HIGH (ref 10.3–14.5)
RH IG SCN BLD-IMP: POSITIVE — SIGNIFICANT CHANGE UP
SODIUM SERPL-SCNC: 138 MMOL/L — SIGNIFICANT CHANGE UP (ref 135–145)
SPECIMEN SOURCE: SIGNIFICANT CHANGE UP
WBC # BLD: 5.93 K/UL — SIGNIFICANT CHANGE UP (ref 3.8–10.5)
WBC # FLD AUTO: 5.93 K/UL — SIGNIFICANT CHANGE UP (ref 3.8–10.5)

## 2022-04-25 PROCEDURE — 88305 TISSUE EXAM BY PATHOLOGIST: CPT | Mod: 26

## 2022-04-25 PROCEDURE — 99232 SBSQ HOSP IP/OBS MODERATE 35: CPT

## 2022-04-25 PROCEDURE — 73630 X-RAY EXAM OF FOOT: CPT | Mod: 26,RT

## 2022-04-25 PROCEDURE — 88304 TISSUE EXAM BY PATHOLOGIST: CPT | Mod: 26

## 2022-04-25 PROCEDURE — 88311 DECALCIFY TISSUE: CPT | Mod: 26

## 2022-04-25 RX ORDER — ONDANSETRON 8 MG/1
4 TABLET, FILM COATED ORAL ONCE
Refills: 0 | Status: DISCONTINUED | OUTPATIENT
Start: 2022-04-25 | End: 2022-04-25

## 2022-04-25 RX ORDER — SODIUM CHLORIDE 9 MG/ML
1000 INJECTION, SOLUTION INTRAVENOUS
Refills: 0 | Status: DISCONTINUED | OUTPATIENT
Start: 2022-04-25 | End: 2022-04-25

## 2022-04-25 RX ORDER — OXYCODONE AND ACETAMINOPHEN 5; 325 MG/1; MG/1
1 TABLET ORAL EVERY 4 HOURS
Refills: 0 | Status: DISCONTINUED | OUTPATIENT
Start: 2022-04-25 | End: 2022-04-26

## 2022-04-25 RX ORDER — ACETAMINOPHEN 500 MG
650 TABLET ORAL EVERY 6 HOURS
Refills: 0 | Status: DISCONTINUED | OUTPATIENT
Start: 2022-04-25 | End: 2022-04-26

## 2022-04-25 RX ORDER — HYDROMORPHONE HYDROCHLORIDE 2 MG/ML
0.5 INJECTION INTRAMUSCULAR; INTRAVENOUS; SUBCUTANEOUS
Refills: 0 | Status: DISCONTINUED | OUTPATIENT
Start: 2022-04-25 | End: 2022-04-25

## 2022-04-25 RX ORDER — CHLORHEXIDINE GLUCONATE 213 G/1000ML
1 SOLUTION TOPICAL
Refills: 0 | Status: DISCONTINUED | OUTPATIENT
Start: 2022-04-25 | End: 2022-04-26

## 2022-04-25 RX ADMIN — SODIUM CHLORIDE 75 MILLILITER(S): 9 INJECTION, SOLUTION INTRAVENOUS at 18:07

## 2022-04-25 RX ADMIN — ERTAPENEM SODIUM 100 MILLIGRAM(S): 1 INJECTION, POWDER, LYOPHILIZED, FOR SOLUTION INTRAMUSCULAR; INTRAVENOUS at 17:42

## 2022-04-25 RX ADMIN — HEPARIN SODIUM 5000 UNIT(S): 5000 INJECTION INTRAVENOUS; SUBCUTANEOUS at 21:24

## 2022-04-25 RX ADMIN — CHLORHEXIDINE GLUCONATE 1 APPLICATION(S): 213 SOLUTION TOPICAL at 14:37

## 2022-04-25 RX ADMIN — CILOSTAZOL 50 MILLIGRAM(S): 100 TABLET ORAL at 21:26

## 2022-04-25 RX ADMIN — LISINOPRIL 2.5 MILLIGRAM(S): 2.5 TABLET ORAL at 05:13

## 2022-04-25 RX ADMIN — CARVEDILOL PHOSPHATE 6.25 MILLIGRAM(S): 80 CAPSULE, EXTENDED RELEASE ORAL at 21:25

## 2022-04-25 RX ADMIN — CARVEDILOL PHOSPHATE 6.25 MILLIGRAM(S): 80 CAPSULE, EXTENDED RELEASE ORAL at 08:43

## 2022-04-25 RX ADMIN — ATORVASTATIN CALCIUM 40 MILLIGRAM(S): 80 TABLET, FILM COATED ORAL at 21:24

## 2022-04-25 RX ADMIN — Medication 2: at 12:35

## 2022-04-25 NOTE — PROGRESS NOTE ADULT - SUBJECTIVE AND OBJECTIVE BOX
infectious diseases progress note:    Patient is a 51y old  Male who presents with a chief complaint of R 2nd toe wound (21 Apr 2022 18:11)        Osteomyelitis           s    Allergies    No Known Allergies    Intolerances        ANTIBIOTICS/RELEVANT:  antimicrobials  ertapenem  IVPB      ertapenem  IVPB 500 milliGRAM(s) IV Intermittent every 24 hours    immunologic:  epoetin arian-epbx (RETACRIT) Injectable 3000 Unit(s) IV Push <User Schedule>    OTHER:  aspirin  chewable 81 milliGRAM(s) Oral daily  atorvastatin 40 milliGRAM(s) Oral at bedtime  carvedilol 6.25 milliGRAM(s) Oral every 12 hours  cilostazol 50 milliGRAM(s) Oral every 12 hours  clopidogrel Tablet 75 milliGRAM(s) Oral daily  dextrose 5%. 1000 milliLiter(s) IV Continuous <Continuous>  dextrose 5%. 1000 milliLiter(s) IV Continuous <Continuous>  dextrose 50% Injectable 25 Gram(s) IV Push once  dextrose 50% Injectable 12.5 Gram(s) IV Push once  dextrose 50% Injectable 25 Gram(s) IV Push once  dextrose Oral Gel 15 Gram(s) Oral once PRN  glucagon  Injectable 1 milliGRAM(s) IntraMuscular once  heparin   Injectable 5000 Unit(s) SubCutaneous every 12 hours  insulin lispro (ADMELOG) corrective regimen sliding scale   SubCutaneous three times a day before meals  lisinopril 2.5 milliGRAM(s) Oral daily      Objective:  Vital Signs Last 24 Hrs  T(C): 36.7 (25 Apr 2022 05:18), Max: 36.7 (24 Apr 2022 20:59)  T(F): 98.1 (25 Apr 2022 05:18), Max: 98.1 (24 Apr 2022 20:59)  HR: 94 (25 Apr 2022 08:41) (75 - 94)  BP: 129/69 (25 Apr 2022 08:41) (94/56 - 129/69)  BP(mean): --  RR: 18 (25 Apr 2022 05:18) (18 - 18)  SpO2: 97% (25 Apr 2022 05:18) (97% - 98%)       Eyes:THOM, EOMI  Ear/Nose/Throat: no oral lesion, no sinus tenderness on percussion	  Neck:no JVD, no lymphadenopathy, supple  Respiratory: CTA callie  Cardiovascular: S1S2 RRR, no murmurs  Gastrointestinal:soft, (+) BS, no HSM  Extremities:no e/e/c        LABS:                        8.8    5.93  )-----------( 188      ( 25 Apr 2022 05:27 )             28.0     04-25    138  |  96  |  66<H>  ----------------------------<  125<H>  4.7   |  23  |  8.44<H>    Ca    9.2      25 Apr 2022 05:27      PT/INR - ( 25 Apr 2022 05:27 )   PT: 13.9 sec;   INR: 1.21 ratio         PTT - ( 25 Apr 2022 05:27 )  PTT:33.1 sec        MICROBIOLOGY:    RECENT CULTURES:  04-21 @ 16:34 .Abscess R foot wound   RUBY      Leclercia adecarboxylata  Enterococcus faecalis  Enterococcus faecalis     Few Leclercia adecarboxylata  Few Enterococcus faecalis  Few Corynebacterium species "Susceptibilities not performed"          RESPIRATORY CULTURES:              RADIOLOGY & ADDITIONAL STUDIES:        Pager [phone]  After 5 pm/weekends or if no response :[phone]

## 2022-04-25 NOTE — PROGRESS NOTE ADULT - SUBJECTIVE AND OBJECTIVE BOX
Patient is a 51y old  Male who presents with a chief complaint of R 2nd toe wound (21 Apr 2022 18:11)      INTERVAL HPI/OVERNIGHT EVENTS:   Pt is scheduled for R foot 2nd ray resection with Dr. Golden at 1230pm. Patient is aware of procedure and is NPO since midnight.    MEDICATIONS  (STANDING):  aspirin  chewable 81 milliGRAM(s) Oral daily  atorvastatin 40 milliGRAM(s) Oral at bedtime  carvedilol 6.25 milliGRAM(s) Oral every 12 hours  cilostazol 50 milliGRAM(s) Oral every 12 hours  clopidogrel Tablet 75 milliGRAM(s) Oral daily  dextrose 5%. 1000 milliLiter(s) (100 mL/Hr) IV Continuous <Continuous>  dextrose 5%. 1000 milliLiter(s) (50 mL/Hr) IV Continuous <Continuous>  dextrose 50% Injectable 25 Gram(s) IV Push once  dextrose 50% Injectable 12.5 Gram(s) IV Push once  dextrose 50% Injectable 25 Gram(s) IV Push once  epoetin arian-epbx (RETACRIT) Injectable 3000 Unit(s) IV Push <User Schedule>  ertapenem  IVPB      ertapenem  IVPB 500 milliGRAM(s) IV Intermittent every 24 hours  glucagon  Injectable 1 milliGRAM(s) IntraMuscular once  heparin   Injectable 5000 Unit(s) SubCutaneous every 12 hours  insulin lispro (ADMELOG) corrective regimen sliding scale   SubCutaneous three times a day before meals  lisinopril 2.5 milliGRAM(s) Oral daily    MEDICATIONS  (PRN):  dextrose Oral Gel 15 Gram(s) Oral once PRN Blood Glucose LESS THAN 70 milliGRAM(s)/deciliter      Allergies    No Known Allergies    Intolerances        Vital Signs Last 24 Hrs  T(C): 36.7 (25 Apr 2022 05:18), Max: 36.7 (24 Apr 2022 07:56)  T(F): 98.1 (25 Apr 2022 05:18), Max: 98.1 (24 Apr 2022 20:59)  HR: 85 (25 Apr 2022 05:18) (61 - 85)  BP: 110/65 (25 Apr 2022 05:18) (94/56 - 117/69)  BP(mean): --  RR: 18 (25 Apr 2022 05:18) (18 - 18)  SpO2: 97% (25 Apr 2022 05:18) (96% - 98%)    LABS:                        8.8    5.93  )-----------( 188      ( 25 Apr 2022 05:27 )             28.0     04-25    138  |  96  |  66<H>  ----------------------------<  125<H>  4.7   |  23  |  8.44<H>    Ca    9.2      25 Apr 2022 05:27      PT/INR - ( 25 Apr 2022 05:27 )   PT: 13.9 sec;   INR: 1.21 ratio         PTT - ( 25 Apr 2022 05:27 )  PTT:33.1 sec    CAPILLARY BLOOD GLUCOSE      POCT Blood Glucose.: 131 mg/dL (25 Apr 2022 05:09)  POCT Blood Glucose.: 137 mg/dL (24 Apr 2022 21:54)  POCT Blood Glucose.: 139 mg/dL (24 Apr 2022 17:25)  POCT Blood Glucose.: 124 mg/dL (24 Apr 2022 12:36)  POCT Blood Glucose.: 137 mg/dL (24 Apr 2022 08:33)      RADIOLOGY & ADDITIONAL TESTS:

## 2022-04-25 NOTE — PROGRESS NOTE ADULT - SUBJECTIVE AND OBJECTIVE BOX
Patient is a 51y old  Male who presents with a chief complaint of toe (25 Apr 2022 09:48)      Vascular Surgery Attending Progress Note    Interval HPI: pt w/o new c/o     Medications:  acetaminophen     Tablet .. 650 milliGRAM(s) Oral every 6 hours PRN  aspirin  chewable 81 milliGRAM(s) Oral daily  atorvastatin 40 milliGRAM(s) Oral at bedtime  carvedilol 6.25 milliGRAM(s) Oral every 12 hours  chlorhexidine 2% Cloths 1 Application(s) Topical <User Schedule>  cilostazol 50 milliGRAM(s) Oral every 12 hours  clopidogrel Tablet 75 milliGRAM(s) Oral daily  dextrose 5%. 1000 milliLiter(s) IV Continuous <Continuous>  dextrose 5%. 1000 milliLiter(s) IV Continuous <Continuous>  dextrose 50% Injectable 25 Gram(s) IV Push once  dextrose 50% Injectable 12.5 Gram(s) IV Push once  dextrose 50% Injectable 25 Gram(s) IV Push once  dextrose Oral Gel 15 Gram(s) Oral once PRN  epoetin arian-epbx (RETACRIT) Injectable 3000 Unit(s) IV Push <User Schedule>  ertapenem  IVPB      ertapenem  IVPB 500 milliGRAM(s) IV Intermittent every 24 hours  glucagon  Injectable 1 milliGRAM(s) IntraMuscular once  heparin   Injectable 5000 Unit(s) SubCutaneous every 12 hours  HYDROmorphone  Injectable 0.5 milliGRAM(s) IV Push every 10 minutes PRN  insulin lispro (ADMELOG) corrective regimen sliding scale   SubCutaneous three times a day before meals  lactated ringers. 1000 milliLiter(s) IV Continuous <Continuous>  lisinopril 2.5 milliGRAM(s) Oral daily  ondansetron Injectable 4 milliGRAM(s) IV Push once PRN  oxycodone    5 mG/acetaminophen 325 mG 1 Tablet(s) Oral every 4 hours PRN      Vital Signs Last 24 Hrs  T(C): 36.7 (25 Apr 2022 21:12), Max: 36.7 (24 Apr 2022 23:58)  T(F): 98 (25 Apr 2022 21:12), Max: 98.1 (24 Apr 2022 23:58)  HR: 74 (25 Apr 2022 21:12) (58 - 94)  BP: 112/70 (25 Apr 2022 21:12) (107/55 - 138/60)  BP(mean): 83 (25 Apr 2022 15:30) (79 - 87)  RR: 17 (25 Apr 2022 21:12) (15 - 18)  SpO2: 98% (25 Apr 2022 21:12) (97% - 100%)  I&O's Summary    24 Apr 2022 07:01  -  25 Apr 2022 07:00  --------------------------------------------------------  IN: 360 mL / OUT: 0 mL / NET: 360 mL    25 Apr 2022 07:01  -  25 Apr 2022 21:34  --------------------------------------------------------  IN: 200 mL / OUT: 0 mL / NET: 200 mL        Physical Exam:  Neuro  A&Ox3 VSS  Vascular:  rle warm well perfused   dressing c/d/i    LABS:                        8.8    5.93  )-----------( 188      ( 25 Apr 2022 05:27 )             28.0     04-25    138  |  96  |  66<H>  ----------------------------<  125<H>  4.7   |  23  |  8.44<H>    Ca    9.2      25 Apr 2022 05:27      PT/INR - ( 25 Apr 2022 05:27 )   PT: 13.9 sec;   INR: 1.21 ratio         PTT - ( 25 Apr 2022 05:27 )  PTT:33.1 sec    STEVE DYER MD  [phone] [phone]

## 2022-04-25 NOTE — DISCHARGE NOTE PROVIDER - NSDCCPGOAL_GEN_ALL_CORE_FT
04/02/20 0859   Discharge Reassessment   Assessment Type Discharge Planning Reassessment   Provided patient/caregiver education on the expected discharge date and the discharge plan Yes   Do you have any problems affording any of your prescribed medications? No   Discharge Plan A Home   Discharge Plan B Home   DME Needed Upon Discharge  medication pump   Anticipated Discharge Disposition Home     Julie Haase RN  Case Management [phone]     To get better and follow your care plan as instructed.

## 2022-04-25 NOTE — BRIEF OPERATIVE NOTE - OPERATION/FINDINGS
s/p right foot partial 2nd digit amputation, good Intraop bleeding, good bone quality at the level of resection, low concern for residual infection or viability

## 2022-04-25 NOTE — DISCHARGE NOTE PROVIDER - NSDCCPCAREPLAN_GEN_ALL_CORE_FT
PRINCIPAL DISCHARGE DIAGNOSIS  Diagnosis: Infection of phalanx of toe  Assessment and Plan of Treatment: a/p amputaion  f/u w/ podiatry as outpt for care      SECONDARY DISCHARGE DIAGNOSES  Diagnosis: ESRD on dialysis  Assessment and Plan of Treatment: hd as per renal

## 2022-04-25 NOTE — DISCHARGE NOTE PROVIDER - NSDCMRMEDTOKEN_GEN_ALL_CORE_FT
aspirin 81 mg oral tablet, chewable: 1 tab(s) orally 2 times a day    Note:Pt is also unsure of most meds n directions.  atorvastatin 40 mg oral tablet: 1 tab(s) orally once a day (at bedtime)  carvedilol 6.25 mg oral tablet: 1 tab(s) orally every 12 hours    Note:Pt unsure of directions. Pt thinks 1 tab once a day.  cilostazol 50 mg oral tablet: 1 tab(s) orally every 12 hours    Note:Pt unsure of directions  clopidogrel 75 mg oral tablet: 1 tab(s) orally once a day  Januvia 100 mg oral tablet: 1 tab(s) orally once a day  lisinopril 2.5 mg oral tablet: 1 tab(s) orally once a day  midodrine 5 mg oral tablet: 1 tab(s) orally 3 times a week  WITH DIALYSIS*  Nanette-Dharmesh oral tablet: 1 tab(s) orally once a day   amoxicillin-clavulanate 875 mg-125 mg oral tablet: 1 tab(s) orally every 12 hours   aspirin 81 mg oral tablet, chewable: 1 tab(s) orally 2 times a day    Note:Pt is also unsure of most meds n directions.  atorvastatin 40 mg oral tablet: 1 tab(s) orally once a day (at bedtime)  carvedilol 6.25 mg oral tablet: 1 tab(s) orally every 12 hours    Note:Pt unsure of directions. Pt thinks 1 tab once a day.  cilostazol 50 mg oral tablet: 1 tab(s) orally every 12 hours    Note:Pt unsure of directions  clopidogrel 75 mg oral tablet: 1 tab(s) orally once a day  Januvia 100 mg oral tablet: 1 tab(s) orally once a day  lisinopril 2.5 mg oral tablet: 1 tab(s) orally once a day  midodrine 5 mg oral tablet: 1 tab(s) orally 3 times a week  WITH DIALYSIS*  Nanette-Dharmesh oral tablet: 1 tab(s) orally once a day

## 2022-04-25 NOTE — PROGRESS NOTE ADULT - SUBJECTIVE AND OBJECTIVE BOX
DATE OF SERVICE: 04-25-22 @ 13:47  CHIEF COMPLAINT:Patient is a 51y old  Male who presents with a chief complaint of toe (25 Apr 2022 09:48)    	        PAST MEDICAL & SURGICAL HISTORY:  Diabetes mellitus  type 2    Foot ulcer due to secondary DM    Coronary artery disease    Acute on chronic systolic heart failure    Breast Reduction  at age 18    Toe amputation status, left    S/P CABG (coronary artery bypass graft)    AICD (automatic cardioverter/defibrillator) present            REVIEW OF SYSTEMS:    EYES: No eye pain, visual disturbances, or discharge  NECK: No pain or stiffness  RESPIRATORY: No cough, wheezing, chills or hemoptysis; No Shortness of Breath  CARDIOVASCULAR: No chest pain, palpitations, passing out,  GASTROINTESTINAL: No abdominal or epigastric pain. No nausea, vomiting, or hematemesis; No diarrhea or constipation. No melena or hematochezia.  GENITOURINARY: No dysuria, frequency, hematuria, or incontinence  NEUROLOGICAL: No headaches,   Medications:  MEDICATIONS  (STANDING):  aspirin  chewable 81 milliGRAM(s) Oral daily  atorvastatin 40 milliGRAM(s) Oral at bedtime  carvedilol 6.25 milliGRAM(s) Oral every 12 hours  chlorhexidine 2% Cloths 1 Application(s) Topical <User Schedule>  cilostazol 50 milliGRAM(s) Oral every 12 hours  clopidogrel Tablet 75 milliGRAM(s) Oral daily  dextrose 5%. 1000 milliLiter(s) (50 mL/Hr) IV Continuous <Continuous>  dextrose 5%. 1000 milliLiter(s) (100 mL/Hr) IV Continuous <Continuous>  dextrose 50% Injectable 25 Gram(s) IV Push once  dextrose 50% Injectable 12.5 Gram(s) IV Push once  dextrose 50% Injectable 25 Gram(s) IV Push once  epoetin arian-epbx (RETACRIT) Injectable 3000 Unit(s) IV Push <User Schedule>  ertapenem  IVPB      ertapenem  IVPB 500 milliGRAM(s) IV Intermittent every 24 hours  glucagon  Injectable 1 milliGRAM(s) IntraMuscular once  heparin   Injectable 5000 Unit(s) SubCutaneous every 12 hours  insulin lispro (ADMELOG) corrective regimen sliding scale   SubCutaneous three times a day before meals  lisinopril 2.5 milliGRAM(s) Oral daily    MEDICATIONS  (PRN):  dextrose Oral Gel 15 Gram(s) Oral once PRN Blood Glucose LESS THAN 70 milliGRAM(s)/deciliter    	    PHYSICAL EXAM:  T(C): 36.3 (04-25-22 @ 13:07), Max: 36.7 (04-24-22 @ 20:59)  HR: 79 (04-25-22 @ 13:07) (75 - 94)  BP: 135/79 (04-25-22 @ 13:07) (94/56 - 135/79)  RR: 16 (04-25-22 @ 13:07) (16 - 18)  SpO2: 97% (04-25-22 @ 13:07) (97% - 98%)  Wt(kg): --  I&O's Summary    24 Apr 2022 07:01  -  25 Apr 2022 07:00  --------------------------------------------------------  IN: 360 mL / OUT: 0 mL / NET: 360 mL        Appearance: Normal	  HEENT:   Normal oral mucosa, PERRL, EOMI	    Cardiovascular: Normal S1 S2, No JVD,  Respiratory: Lungs clear to auscultation	  Psychiatry: A & O   Gastrointestinal:  Soft, Non-tender, + BS	    Neurologic: Non-focal  Extremities: foot dressed  pvd     TELEMETRY: 	    ECG:  	  RADIOLOGY:  OTHER: 	  	  LABS:	 	    CARDIAC MARKERS:                                8.8    5.93  )-----------( 188      ( 25 Apr 2022 05:27 )             28.0     04-25    138  |  96  |  66<H>  ----------------------------<  125<H>  4.7   |  23  |  8.44<H>    Ca    9.2      25 Apr 2022 05:27      proBNP:   Lipid Profile:   HgA1c:   TSH:

## 2022-04-25 NOTE — BRIEF OPERATIVE NOTE - SPECIMENS
Micro 1 - right foot clean bone margin, Path 1 - right foot dirty bone and soft tissue, Path 2 - right foot clean bone margin

## 2022-04-25 NOTE — PROGRESS NOTE ADULT - SUBJECTIVE AND OBJECTIVE BOX
DATE OF SERVICE: 04-25-22 @ 09:30    Subjective: Patient seen and examined. No new events except as noted.     SUBJECTIVE/ROS:  feels ok       MEDICATIONS:  MEDICATIONS  (STANDING):  aspirin  chewable 81 milliGRAM(s) Oral daily  atorvastatin 40 milliGRAM(s) Oral at bedtime  carvedilol 6.25 milliGRAM(s) Oral every 12 hours  cilostazol 50 milliGRAM(s) Oral every 12 hours  clopidogrel Tablet 75 milliGRAM(s) Oral daily  dextrose 5%. 1000 milliLiter(s) (50 mL/Hr) IV Continuous <Continuous>  dextrose 5%. 1000 milliLiter(s) (100 mL/Hr) IV Continuous <Continuous>  dextrose 50% Injectable 25 Gram(s) IV Push once  dextrose 50% Injectable 12.5 Gram(s) IV Push once  dextrose 50% Injectable 25 Gram(s) IV Push once  epoetin arian-epbx (RETACRIT) Injectable 3000 Unit(s) IV Push <User Schedule>  ertapenem  IVPB      ertapenem  IVPB 500 milliGRAM(s) IV Intermittent every 24 hours  glucagon  Injectable 1 milliGRAM(s) IntraMuscular once  heparin   Injectable 5000 Unit(s) SubCutaneous every 12 hours  insulin lispro (ADMELOG) corrective regimen sliding scale   SubCutaneous three times a day before meals  lisinopril 2.5 milliGRAM(s) Oral daily      PHYSICAL EXAM:  T(C): 36.7 (04-25-22 @ 05:18), Max: 36.7 (04-24-22 @ 20:59)  HR: 94 (04-25-22 @ 08:41) (75 - 94)  BP: 129/69 (04-25-22 @ 08:41) (94/56 - 129/69)  RR: 18 (04-25-22 @ 05:18) (18 - 18)  SpO2: 97% (04-25-22 @ 05:18) (97% - 98%)  Wt(kg): --  I&O's Summary    24 Apr 2022 07:01  -  25 Apr 2022 07:00  --------------------------------------------------------  IN: 360 mL / OUT: 0 mL / NET: 360 mL      Height (cm): 175.3 (04-25 @ 05:52)  Weight (kg): 90.7 (04-25 @ 05:52)  BMI (kg/m2): 29.5 (04-25 @ 05:52)  BSA (m2): 2.07 (04-25 @ 05:52)      JVP: Normal  Neck: supple  Lung: clear   CV: S1 S2 , Murmur:  Abd: soft  Ext: No edema  neuro: Awake / alert  Psych: flat affect  Skin: normal``    LABS/DATA:    CARDIAC MARKERS:                                8.8    5.93  )-----------( 188      ( 25 Apr 2022 05:27 )             28.0     04-25    138  |  96  |  66<H>  ----------------------------<  125<H>  4.7   |  23  |  8.44<H>    Ca    9.2      25 Apr 2022 05:27      proBNP:   Lipid Profile:   HgA1c:   TSH:     TELE:  EKG:         From: Sb Packer  To: Dori Conrad MD  Sent: 2019 6:55 PM CDT  Subject: Non-Urgent Medical Question    This message is being sent by Radha Packer on behalf of Sb Suggs,    Sb has developed a fever lasting from Saturday night and into  evening. It hasn't gone above 102 degrees, he definetely seems more tired, sleeps much longer (almost a 5hour nap) but otherwise seems okay. Is drinking and eating well. Do I need to bring him in if he's not better by tomorrow ()?    My other concern is that I'm being induced with a  on  (if she doesn't come early), do I need to worry about his fever impacting a ? If he's feeling better by then, so I still need to keep them apart?

## 2022-04-25 NOTE — PROGRESS NOTE ADULT - ASSESSMENT
Plan:   To OR today at 1230pm with Dr. Golden for R foot partial 2nd ray resection.   CXR on sunrise.  EKG on sunrise.  Medical/Cardiac clearance since 4/22 and documented in chart.  Consent signed and in chart.  Procedure was explained to patient in detail. All alternatives, risks and complications were discussed. All questions answered.

## 2022-04-25 NOTE — PROGRESS NOTE ADULT - ASSESSMENT
6o years old with DM, CAD, PVD, CABG, ESRD, history of left great toe amputation (1/22)  presents with right toe ulcer present for about a week. Might be related to him peeling some skin off the toe.  no fever or chills  no systemic signs  the wd is clean but does probe down to bone     pt is growing Few Leclercia adecarboxylata, Few Enterococcus faecalis and  Few Corynebacterium species   pt is on ertapenem.    i dont thinkk that cultures are significant and will stop the vanco  length of post op therapy will be dependent upon the margins and wound

## 2022-04-25 NOTE — PROGRESS NOTE ADULT - ASSESSMENT
51yM w/ PAD and R 2nd toe wound    - Recommend Arterial duplex of RLE to assess stent patency.  - C/W ASA 81 and Pletal 50 BID for PAD  - f/u as outpt   reconsult prn

## 2022-04-25 NOTE — PROGRESS NOTE ADULT - ASSESSMENT
pt w/ infected r second toe /for amputation today  abs as per id   vasc studies noted  vasc/ pod eval appreciated   cards clearance noted    HD as per renal    DM  continue finger sticks with short acting insulin sliding scale  no oral meds    gi dvt proph   medically stable for sx

## 2022-04-25 NOTE — DISCHARGE NOTE PROVIDER - HOSPITAL COURSE
DATE OF SERVICE: 04-26-22 @ 12:27        PAST MEDICAL & SURGICAL HISTORY:  reviewed      REVIEW OF SYSTEMS:  CONSTITUTIONAL: No fever, weight loss, or fatigue  EYES: No eye pain, visual disturbances, or discharge  NECK: No pain or stiffness  RESPIRATORY: No cough, wheezing, chills or hemoptysis; No Shortness of Breath  CARDIOVASCULAR: No chest pain, palpitations, passing out,   GASTROINTESTINAL: No abdominal or epigastric pain. No nausea, vomiting, or hematemesis; No diarrhea or constipation. No melena or hematochezia.  GENITOURINARY: No dysuria, frequency, hematuria, or incontinence  NEUROLOGICAL: No headaches,     Medications:  MEDICATIONS  (STANDING):  aspirin  chewable 81 milliGRAM(s) Oral daily  atorvastatin 40 milliGRAM(s) Oral at bedtime  carvedilol 6.25 milliGRAM(s) Oral every 12 hours  chlorhexidine 2% Cloths 1 Application(s) Topical <User Schedule>  cilostazol 50 milliGRAM(s) Oral every 12 hours  clopidogrel Tablet 75 milliGRAM(s) Oral daily  dextrose 5%. 1000 milliLiter(s) (50 mL/Hr) IV Continuous <Continuous>  dextrose 5%. 1000 milliLiter(s) (100 mL/Hr) IV Continuous <Continuous>  dextrose 50% Injectable 25 Gram(s) IV Push once  dextrose 50% Injectable 12.5 Gram(s) IV Push once  dextrose 50% Injectable 25 Gram(s) IV Push once  epoetin arian-epbx (RETACRIT) Injectable 3000 Unit(s) IV Push <User Schedule>  ertapenem  IVPB      ertapenem  IVPB 500 milliGRAM(s) IV Intermittent every 24 hours  glucagon  Injectable 1 milliGRAM(s) IntraMuscular once  heparin   Injectable 5000 Unit(s) SubCutaneous every 12 hours  insulin lispro (ADMELOG) corrective regimen sliding scale   SubCutaneous three times a day before meals  lisinopril 2.5 milliGRAM(s) Oral daily    MEDICATIONS  (PRN):  acetaminophen     Tablet .. 650 milliGRAM(s) Oral every 6 hours PRN Mild Pain (1 - 3)  dextrose Oral Gel 15 Gram(s) Oral once PRN Blood Glucose LESS THAN 70 milliGRAM(s)/deciliter  oxycodone    5 mG/acetaminophen 325 mG 1 Tablet(s) Oral every 4 hours PRN Moderate Pain (4 - 6)    	  vss        Appearance: Normal	  HEENT:   Normal oral mucosa, PERRL, EOMI	    Cardiovascular: Normal S1 S2, No JVD,   Respiratory: Lungs clear to auscultation	  Psychiatry: A & O x 3, Mood & affect appropriate  Gastrointestinal:  Soft, Non-tender, + BS	  r foot dressed	    pt w/ infected r second toe /  s/p amputation  abs as per id /oral x 4 days  vasc studies noted  vasc/ pod eval appreciated   cards clearance noted    HD as per renal    DM  continue finger sticks with short acting insulin sliding scale  no oral meds    gi dvt proph   medically stable for sx for d/c

## 2022-04-25 NOTE — PROGRESS NOTE ADULT - PROBLEM SELECTOR PLAN 1
I Leonardo Malone MD have personally  seen and examined the patient today and have noted the findings and formulated the plan of care.  I Leonardo Malone MD have personally seen and examined the patient at bedside today at  8am
Plan for HD today  Maintenance TTS  Goes to [facility]
I Leonardo Malone MD have personally  seen and examined the patient today and have noted the findings and formulated the plan of care.  I Leonardo Malone MD have personally seen and examined the patient at bedside today at  8pm

## 2022-04-25 NOTE — DISCHARGE NOTE PROVIDER - NSDCFUADDINST_GEN_ALL_CORE_FT
Podiatry Discharge Instructions:  Follow up: Please follow up with Dr. Golden within 1 week of discharge from the hospital, please call [phone] for appointment and discuss that you recently were seen in the hospital.  Wound Care: Please leave your dressing clean dry intact until your follow up appointment.  Weight bearing: Please weight bear as tolerated to the right heel in a surgical shoe.  Antibiotics: Please continue as instructed.

## 2022-04-25 NOTE — BRIEF OPERATIVE NOTE - COMMENTS
s/p right foot partial 2nd digit amputation, good Intraop bleeding, good bone quality at the level of resection, low concern for residual infection or viability, discharge pending appearance

## 2022-04-25 NOTE — PROGRESS NOTE ADULT - PROBLEM SELECTOR PLAN 2
I Leonardo Malone MD have personally  seen and examined the patient today and have noted the findings and formulated the plan of care.  I Leonardo Malone MD have personally seen and examined the patient at bedside today at  8am
I Leonardo Malone MD have personally  seen and examined the patient today and have noted the findings and formulated the plan of care.  I Leonardo Malone MD have personally seen and examined the patient at bedside today at  8pm
Start epo 3000 with each hd  monitor h/h

## 2022-04-25 NOTE — DISCHARGE NOTE PROVIDER - CARE PROVIDER_API CALL
Gene Myers (DPM)  Podiatric Medicine and Surgery  [address] [place] [address]  Phone: [phone]  Fax: [phone]  Follow Up Time:

## 2022-04-26 ENCOUNTER — TRANSCRIPTION ENCOUNTER (OUTPATIENT)
Age: 52
End: 2022-04-26

## 2022-04-26 ENCOUNTER — APPOINTMENT (OUTPATIENT)
Dept: VASCULAR SURGERY | Facility: CLINIC | Age: 52
End: 2022-04-26

## 2022-04-26 VITALS
RESPIRATION RATE: 18 BRPM | SYSTOLIC BLOOD PRESSURE: 116 MMHG | HEART RATE: 78 BPM | TEMPERATURE: 98 F | OXYGEN SATURATION: 99 %

## 2022-04-26 LAB
ANION GAP SERPL CALC-SCNC: 24 MMOL/L — HIGH (ref 5–17)
BUN SERPL-MCNC: 89 MG/DL — HIGH (ref 7–23)
CALCIUM SERPL-MCNC: 9.3 MG/DL — SIGNIFICANT CHANGE UP (ref 8.4–10.5)
CHLORIDE SERPL-SCNC: 95 MMOL/L — LOW (ref 96–108)
CO2 SERPL-SCNC: 20 MMOL/L — LOW (ref 22–31)
CREAT SERPL-MCNC: 9.92 MG/DL — HIGH (ref 0.5–1.3)
CULTURE RESULTS: SIGNIFICANT CHANGE UP
EGFR: 6 ML/MIN/1.73M2 — LOW
GLUCOSE BLDC GLUCOMTR-MCNC: 123 MG/DL — HIGH (ref 70–99)
GLUCOSE BLDC GLUCOMTR-MCNC: 138 MG/DL — HIGH (ref 70–99)
GLUCOSE BLDC GLUCOMTR-MCNC: 149 MG/DL — HIGH (ref 70–99)
GLUCOSE SERPL-MCNC: 99 MG/DL — SIGNIFICANT CHANGE UP (ref 70–99)
HCT VFR BLD CALC: 26.7 % — LOW (ref 39–50)
HGB BLD-MCNC: 9 G/DL — LOW (ref 13–17)
MCHC RBC-ENTMCNC: 30.5 PG — SIGNIFICANT CHANGE UP (ref 27–34)
MCHC RBC-ENTMCNC: 33.7 GM/DL — SIGNIFICANT CHANGE UP (ref 32–36)
MCV RBC AUTO: 90.5 FL — SIGNIFICANT CHANGE UP (ref 80–100)
NRBC # BLD: 0 /100 WBCS — SIGNIFICANT CHANGE UP (ref 0–0)
ORGANISM # SPEC MICROSCOPIC CNT: SIGNIFICANT CHANGE UP
PLATELET # BLD AUTO: 194 K/UL — SIGNIFICANT CHANGE UP (ref 150–400)
POTASSIUM SERPL-MCNC: 4.9 MMOL/L — SIGNIFICANT CHANGE UP (ref 3.5–5.3)
POTASSIUM SERPL-SCNC: 4.9 MMOL/L — SIGNIFICANT CHANGE UP (ref 3.5–5.3)
RBC # BLD: 2.95 M/UL — LOW (ref 4.2–5.8)
RBC # FLD: 14.8 % — HIGH (ref 10.3–14.5)
SODIUM SERPL-SCNC: 139 MMOL/L — SIGNIFICANT CHANGE UP (ref 135–145)
SPECIMEN SOURCE: SIGNIFICANT CHANGE UP
WBC # BLD: 5.57 K/UL — SIGNIFICANT CHANGE UP (ref 3.8–10.5)
WBC # FLD AUTO: 5.57 K/UL — SIGNIFICANT CHANGE UP (ref 3.8–10.5)

## 2022-04-26 PROCEDURE — 86901 BLOOD TYPING SEROLOGIC RH(D): CPT

## 2022-04-26 PROCEDURE — 83036 HEMOGLOBIN GLYCOSYLATED A1C: CPT

## 2022-04-26 PROCEDURE — 73630 X-RAY EXAM OF FOOT: CPT

## 2022-04-26 PROCEDURE — 71046 X-RAY EXAM CHEST 2 VIEWS: CPT

## 2022-04-26 PROCEDURE — 86900 BLOOD TYPING SEROLOGIC ABO: CPT

## 2022-04-26 PROCEDURE — 93971 EXTREMITY STUDY: CPT

## 2022-04-26 PROCEDURE — U0003: CPT

## 2022-04-26 PROCEDURE — 73620 X-RAY EXAM OF FOOT: CPT

## 2022-04-26 PROCEDURE — 87640 STAPH A DNA AMP PROBE: CPT

## 2022-04-26 PROCEDURE — 36415 COLL VENOUS BLD VENIPUNCTURE: CPT

## 2022-04-26 PROCEDURE — 93926 LOWER EXTREMITY STUDY: CPT

## 2022-04-26 PROCEDURE — 85730 THROMBOPLASTIN TIME PARTIAL: CPT

## 2022-04-26 PROCEDURE — 85025 COMPLETE CBC W/AUTO DIFF WBC: CPT

## 2022-04-26 PROCEDURE — 99233 SBSQ HOSP IP/OBS HIGH 50: CPT

## 2022-04-26 PROCEDURE — 82962 GLUCOSE BLOOD TEST: CPT

## 2022-04-26 PROCEDURE — 88305 TISSUE EXAM BY PATHOLOGIST: CPT

## 2022-04-26 PROCEDURE — 88304 TISSUE EXAM BY PATHOLOGIST: CPT

## 2022-04-26 PROCEDURE — 85027 COMPLETE CBC AUTOMATED: CPT

## 2022-04-26 PROCEDURE — 0225U NFCT DS DNA&RNA 21 SARSCOV2: CPT

## 2022-04-26 PROCEDURE — 80053 COMPREHEN METABOLIC PANEL: CPT

## 2022-04-26 PROCEDURE — U0005: CPT

## 2022-04-26 PROCEDURE — 87077 CULTURE AEROBIC IDENTIFY: CPT

## 2022-04-26 PROCEDURE — 93923 UPR/LXTR ART STDY 3+ LVLS: CPT

## 2022-04-26 PROCEDURE — 99232 SBSQ HOSP IP/OBS MODERATE 35: CPT

## 2022-04-26 PROCEDURE — 86850 RBC ANTIBODY SCREEN: CPT

## 2022-04-26 PROCEDURE — 97161 PT EVAL LOW COMPLEX 20 MIN: CPT

## 2022-04-26 PROCEDURE — 87070 CULTURE OTHR SPECIMN AEROBIC: CPT

## 2022-04-26 PROCEDURE — 85610 PROTHROMBIN TIME: CPT

## 2022-04-26 PROCEDURE — 87641 MR-STAPH DNA AMP PROBE: CPT

## 2022-04-26 PROCEDURE — 87186 SC STD MICRODIL/AGAR DIL: CPT

## 2022-04-26 PROCEDURE — 88311 DECALCIFY TISSUE: CPT

## 2022-04-26 PROCEDURE — 99285 EMERGENCY DEPT VISIT HI MDM: CPT | Mod: 25

## 2022-04-26 PROCEDURE — 86140 C-REACTIVE PROTEIN: CPT

## 2022-04-26 PROCEDURE — 96374 THER/PROPH/DIAG INJ IV PUSH: CPT

## 2022-04-26 PROCEDURE — 87075 CULTR BACTERIA EXCEPT BLOOD: CPT

## 2022-04-26 PROCEDURE — 80048 BASIC METABOLIC PNL TOTAL CA: CPT

## 2022-04-26 PROCEDURE — 85652 RBC SED RATE AUTOMATED: CPT

## 2022-04-26 PROCEDURE — 96375 TX/PRO/DX INJ NEW DRUG ADDON: CPT

## 2022-04-26 PROCEDURE — 87205 SMEAR GRAM STAIN: CPT

## 2022-04-26 PROCEDURE — 99261: CPT

## 2022-04-26 RX ORDER — ATORVASTATIN CALCIUM 80 MG/1
1 TABLET, FILM COATED ORAL
Qty: 0 | Refills: 0 | DISCHARGE
Start: 2022-04-26

## 2022-04-26 RX ADMIN — CILOSTAZOL 50 MILLIGRAM(S): 100 TABLET ORAL at 09:10

## 2022-04-26 RX ADMIN — ERYTHROPOIETIN 3000 UNIT(S): 10000 INJECTION, SOLUTION INTRAVENOUS; SUBCUTANEOUS at 14:28

## 2022-04-26 RX ADMIN — HEPARIN SODIUM 5000 UNIT(S): 5000 INJECTION INTRAVENOUS; SUBCUTANEOUS at 09:10

## 2022-04-26 RX ADMIN — ERTAPENEM SODIUM 100 MILLIGRAM(S): 1 INJECTION, POWDER, LYOPHILIZED, FOR SOLUTION INTRAMUSCULAR; INTRAVENOUS at 17:05

## 2022-04-26 RX ADMIN — Medication 81 MILLIGRAM(S): at 11:52

## 2022-04-26 RX ADMIN — CLOPIDOGREL BISULFATE 75 MILLIGRAM(S): 75 TABLET, FILM COATED ORAL at 11:52

## 2022-04-26 RX ADMIN — CHLORHEXIDINE GLUCONATE 1 APPLICATION(S): 213 SOLUTION TOPICAL at 06:09

## 2022-04-26 NOTE — PROGRESS NOTE ADULT - SUBJECTIVE AND OBJECTIVE BOX
infectious diseases progress note:    Patient is a 51y old  Male who presents with a chief complaint of toe (25 Apr 2022 09:48)        Osteomyelitis           Allergies    No Known Allergies    Intolerances        ANTIBIOTICS/RELEVANT:  antimicrobials  ertapenem  IVPB      ertapenem  IVPB 500 milliGRAM(s) IV Intermittent every 24 hours    immunologic:  epoetin arian-epbx (RETACRIT) Injectable 3000 Unit(s) IV Push <User Schedule>    OTHER:  acetaminophen     Tablet .. 650 milliGRAM(s) Oral every 6 hours PRN  aspirin  chewable 81 milliGRAM(s) Oral daily  atorvastatin 40 milliGRAM(s) Oral at bedtime  carvedilol 6.25 milliGRAM(s) Oral every 12 hours  chlorhexidine 2% Cloths 1 Application(s) Topical <User Schedule>  cilostazol 50 milliGRAM(s) Oral every 12 hours  clopidogrel Tablet 75 milliGRAM(s) Oral daily  dextrose 5%. 1000 milliLiter(s) IV Continuous <Continuous>  dextrose 5%. 1000 milliLiter(s) IV Continuous <Continuous>  dextrose 50% Injectable 25 Gram(s) IV Push once  dextrose 50% Injectable 12.5 Gram(s) IV Push once  dextrose 50% Injectable 25 Gram(s) IV Push once  dextrose Oral Gel 15 Gram(s) Oral once PRN  glucagon  Injectable 1 milliGRAM(s) IntraMuscular once  heparin   Injectable 5000 Unit(s) SubCutaneous every 12 hours  insulin lispro (ADMELOG) corrective regimen sliding scale   SubCutaneous three times a day before meals  lisinopril 2.5 milliGRAM(s) Oral daily  oxycodone    5 mG/acetaminophen 325 mG 1 Tablet(s) Oral every 4 hours PRN      Objective:  Vital Signs Last 24 Hrs  T(C): 36.6 (26 Apr 2022 05:30), Max: 36.7 (25 Apr 2022 21:12)  T(F): 97.9 (26 Apr 2022 05:30), Max: 98 (25 Apr 2022 21:12)  HR: 78 (26 Apr 2022 05:30) (58 - 94)  BP: 144/71 (26 Apr 2022 05:30) (107/55 - 144/71)  BP(mean): 83 (25 Apr 2022 15:30) (79 - 87)  RR: 17 (26 Apr 2022 05:30) (15 - 18)  SpO2: 97% (26 Apr 2022 05:30) (97% - 100%)       Eyes:THOM, EOMI  Ear/Nose/Throat: no oral lesion, no sinus tenderness on percussion	  Neck:no JVD, no lymphadenopathy, supple  Respiratory: CTA callie  Cardiovascular: S1S2 RRR, no murmurs  Gastrointestinal:soft, (+) BS, no HSM  Extremities:no e/e/c        LABS:                        9.0    5.57  )-----------( 194      ( 26 Apr 2022 07:20 )             26.7     04-26    139  |  95<L>  |  89<H>  ----------------------------<  99  4.9   |  20<L>  |  9.92<H>    Ca    9.3      26 Apr 2022 07:18      PT/INR - ( 25 Apr 2022 05:27 )   PT: 13.9 sec;   INR: 1.21 ratio         PTT - ( 25 Apr 2022 05:27 )  PTT:33.1 sec        MICROBIOLOGY:    RECENT CULTURES:  04-25 @ 19:44 .Tissue Other       No polymorphonuclear leukocytes per low power field  No organisms seen per oil power field             04-21 @ 16:34 .Abscess R foot wound   RUBY      Leclercia adecarboxylata  Enterococcus faecalis  Enterococcus faecalis     Few Leclercia adecarboxylata  Few Enterococcus faecalis  Few Corynebacterium species "Susceptibilities not performed"          RESPIRATORY CULTURES:              RADIOLOGY & ADDITIONAL STUDIES:        Pager [phone]  After 5 pm/weekends or if no response :[phone]

## 2022-04-26 NOTE — PROGRESS NOTE ADULT - SUBJECTIVE AND OBJECTIVE BOX
DATE OF SERVICE: 04-26-22 @ 07:53    Subjective: Patient seen and examined. No new events except as noted.     SUBJECTIVE/ROS:  feels ok   No chest pain, dyspnea, palpitation, or dizziness.       MEDICATIONS:  MEDICATIONS  (STANDING):  aspirin  chewable 81 milliGRAM(s) Oral daily  atorvastatin 40 milliGRAM(s) Oral at bedtime  carvedilol 6.25 milliGRAM(s) Oral every 12 hours  chlorhexidine 2% Cloths 1 Application(s) Topical <User Schedule>  cilostazol 50 milliGRAM(s) Oral every 12 hours  clopidogrel Tablet 75 milliGRAM(s) Oral daily  dextrose 5%. 1000 milliLiter(s) (50 mL/Hr) IV Continuous <Continuous>  dextrose 5%. 1000 milliLiter(s) (100 mL/Hr) IV Continuous <Continuous>  dextrose 50% Injectable 25 Gram(s) IV Push once  dextrose 50% Injectable 12.5 Gram(s) IV Push once  dextrose 50% Injectable 25 Gram(s) IV Push once  epoetin arian-epbx (RETACRIT) Injectable 3000 Unit(s) IV Push <User Schedule>  ertapenem  IVPB      ertapenem  IVPB 500 milliGRAM(s) IV Intermittent every 24 hours  glucagon  Injectable 1 milliGRAM(s) IntraMuscular once  heparin   Injectable 5000 Unit(s) SubCutaneous every 12 hours  insulin lispro (ADMELOG) corrective regimen sliding scale   SubCutaneous three times a day before meals  lisinopril 2.5 milliGRAM(s) Oral daily      PHYSICAL EXAM:  T(C): 36.6 (04-26-22 @ 05:30), Max: 36.7 (04-25-22 @ 21:12)  HR: 78 (04-26-22 @ 05:30) (58 - 94)  BP: 144/71 (04-26-22 @ 05:30) (107/55 - 144/71)  RR: 17 (04-26-22 @ 05:30) (15 - 18)  SpO2: 97% (04-26-22 @ 05:30) (97% - 100%)  Wt(kg): --  I&O's Summary    25 Apr 2022 07:01  -  26 Apr 2022 07:00  --------------------------------------------------------  IN: 200 mL / OUT: 0 mL / NET: 200 mL      Height (cm): 175.3 (04-25 @ 13:02)  Weight (kg): 90.7 (04-25 @ 13:02)  BMI (kg/m2): 29.5 (04-25 @ 13:02)  BSA (m2): 2.07 (04-25 @ 13:02)      JVP: Normal  Neck: supple  Lung: clear   CV: S1 S2 , Murmur:  Abd: soft  Ext: No edema  neuro: Awake / alert  Psych: flat affect      LABS/DATA:    CARDIAC MARKERS:                                9.0    5.57  )-----------( 194      ( 26 Apr 2022 07:20 )             26.7     04-25    138  |  96  |  66<H>  ----------------------------<  125<H>  4.7   |  23  |  8.44<H>    Ca    9.2      25 Apr 2022 05:27      proBNP:   Lipid Profile:   HgA1c:   TSH:     TELE:  EKG:

## 2022-04-26 NOTE — DISCHARGE NOTE NURSING/CASE MANAGEMENT/SOCIAL WORK - NSDCPEFALRISK_GEN_ALL_CORE
For information on Fall & Injury Prevention, visit: [email or web address] OR  [email or web address] OR  https://www.cdc.gov/steadi/patient.html

## 2022-04-26 NOTE — PROGRESS NOTE ADULT - ASSESSMENT
6o years old with DM, CAD, PVD, CABG, ESRD, history of left great toe amputation (1/22)  presents with right toe ulcer present for about a week. Might be related to him peeling some skin off the toe.  no fever or chills  no systemic signs  the wd is clean but does probe down to bone     pt is growing Few Leclercia adecarboxylata, Few Enterococcus faecalis and  Few Corynebacterium species   pt is on ertapenem.    i dont think that the preop  cultures are significant and will stop the vanco    OR cultures are negative  if wd looks good, we will only need about 5 days of post op ab  pathology pending

## 2022-04-26 NOTE — PROGRESS NOTE ADULT - ASSESSMENT
pt w/ infected r second toe /s/p amputation  abs as per id /changed to po for 4 more days  vasc studies noted  vasc/ pod eval appreciated       HD as per renal    DM  continue finger sticks with short acting insulin sliding scale  no oral meds    gi dvt proph   medically stable for d/c

## 2022-04-26 NOTE — DISCHARGE NOTE NURSING/CASE MANAGEMENT/SOCIAL WORK - NURSING SECTION COMPLETE
Patient/Caregiver provided printed discharge information.
Epi Fuller  CRITICAL CARE MEDICINE  [address] [facility], [address]  [place] [address]  Phone: [phone]  Fax: [phone]  Follow Up Time: 1 week    Venkatesh Kern  FAMILY MEDICINE  [address] [place] [address]  Phone: [phone]  Fax: [phone]  Follow Up Time: 1 week    Yoni Rodarte  INFECTIOUS DISEASE  [address] [facility], [address]  [place] [address]  Phone: [phone]  Fax: [phone]  Follow Up Time: 1 week    Hakeem Saxena  CARDIOVASCULAR DISEASE  [address] [place] [address]  Phone: [phone]  Fax: [phone]  Follow Up Time: 1 week

## 2022-04-26 NOTE — PHYSICAL THERAPY INITIAL EVALUATION ADULT - ADDITIONAL COMMENTS
Pt lives in private home, steps +hand rail to reach his apt. Prior to admission, pt was I with all functional mobility and ADLs without AD. Pt does own a rolling walker.

## 2022-04-26 NOTE — PROGRESS NOTE ADULT - PROVIDER SPECIALTY LIST ADULT
Internal Medicine
Podiatry
Cardiology
Infectious Disease
Internal Medicine
Podiatry
Cardiology
Internal Medicine
Nephrology
Podiatry
Vascular Surgery
Nephrology
Vascular Surgery

## 2022-04-26 NOTE — PROGRESS NOTE ADULT - ASSESSMENT
Pt. with ESRD on HD, admitted with right toe infection, s/p toe amputation.     ESRD on HD: Pt. has been tolerating HD well, AVF functioning well. Clinically stable, plan for maintenance HD today. Monitor BMP.   Anemia: in the setting of ESRD. Continue LUX with HD. Monitor CBC.   HTN: BP stable, Plan for HD with UF today, monitor on current BP meds.

## 2022-04-26 NOTE — PROGRESS NOTE ADULT - SUBJECTIVE AND OBJECTIVE BOX
Podiatry pager #: [phone] ([place])/ [phone] ([facility])    Patient is a 51y old  Male who presents with a chief complaint of wd (26 Apr 2022 08:23)       INTERVAL HPI/OVERNIGHT EVENTS:  Patient seen and evaluated at bedside.  Pt is resting comfortable in NAD. Denies N/V/F/C.     Allergies    No Known Allergies    Intolerances        Vital Signs Last 24 Hrs  T(C): 36.6 (26 Apr 2022 05:30), Max: 36.7 (25 Apr 2022 21:12)  T(F): 97.9 (26 Apr 2022 05:30), Max: 98 (25 Apr 2022 21:12)  HR: 78 (26 Apr 2022 05:30) (58 - 80)  BP: 144/71 (26 Apr 2022 05:30) (107/55 - 144/71)  BP(mean): 83 (25 Apr 2022 15:30) (79 - 87)  RR: 17 (26 Apr 2022 05:30) (15 - 18)  SpO2: 97% (26 Apr 2022 05:30) (97% - 100%)    LABS:                        9.0    5.57  )-----------( 194      ( 26 Apr 2022 07:20 )             26.7     04-26    139  |  95<L>  |  89<H>  ----------------------------<  99  4.9   |  20<L>  |  9.92<H>    Ca    9.3      26 Apr 2022 07:18      PT/INR - ( 25 Apr 2022 05:27 )   PT: 13.9 sec;   INR: 1.21 ratio         PTT - ( 25 Apr 2022 05:27 )  PTT:33.1 sec    CAPILLARY BLOOD GLUCOSE      POCT Blood Glucose.: 149 mg/dL (26 Apr 2022 09:01)  POCT Blood Glucose.: 154 mg/dL (25 Apr 2022 21:47)  POCT Blood Glucose.: 103 mg/dL (25 Apr 2022 18:04)  POCT Blood Glucose.: 123 mg/dL (25 Apr 2022 14:47)  POCT Blood Glucose.: 154 mg/dL (25 Apr 2022 12:09)      Lower Extremity Physical Exam:  Vascular: DP/PT 0/4 B/L, CFT <3 seconds B/L, Temperature gradient warm to cool b/l  Neuro: Epicritic sensation absent to the level of digits B/L  Musculoskeletal/Ortho: s/p right foot partial 1st ray resection closed   Derm: s/p 4/25 R foot partial 2nd toe amputation, closed, sutures intact, no necrosis, no acute SOI    RADIOLOGY & ADDITIONAL TESTS:

## 2022-04-26 NOTE — PROGRESS NOTE ADULT - SUBJECTIVE AND OBJECTIVE BOX
DATE OF SERVICE: 04-26-22 @ 13:03  CHIEF COMPLAINT:Patient is a 51y old  Male who presents with a chief complaint of wd (26 Apr 2022 08:23)    	        PAST MEDICAL & SURGICAL HISTORY:  Diabetes mellitus  type 2    Foot ulcer due to secondary DM    Coronary artery disease    Acute on chronic systolic heart failure    Breast Reduction  at age 18    Toe amputation status, left    S/P CABG (coronary artery bypass graft)    AICD (automatic cardioverter/defibrillator) present            REVIEW OF SYSTEMS:  CONSTITUTIONAL: No fever, weight loss, or fatigue  EYES: No eye pain, visual disturbances, or discharge  NECK: No pain or stiffness  RESPIRATORY: No cough, wheezing, chills or hemoptysis; No Shortness of Breath  CARDIOVASCULAR: No chest pain, palpitations, passing out, dizziness, or leg swelling  GASTROINTESTINAL: No abdominal or epigastric pain. No nausea, vomiting, or hematemesis; No diarrhea or constipation. No melena or hematochezia.  GENITOURINARY: No dysuria, frequency, hematuria, or incontinence  NEUROLOGICAL: No headaches,     Medications:  MEDICATIONS  (STANDING):  aspirin  chewable 81 milliGRAM(s) Oral daily  atorvastatin 40 milliGRAM(s) Oral at bedtime  carvedilol 6.25 milliGRAM(s) Oral every 12 hours  chlorhexidine 2% Cloths 1 Application(s) Topical <User Schedule>  cilostazol 50 milliGRAM(s) Oral every 12 hours  clopidogrel Tablet 75 milliGRAM(s) Oral daily  dextrose 5%. 1000 milliLiter(s) (50 mL/Hr) IV Continuous <Continuous>  dextrose 5%. 1000 milliLiter(s) (100 mL/Hr) IV Continuous <Continuous>  dextrose 50% Injectable 25 Gram(s) IV Push once  dextrose 50% Injectable 12.5 Gram(s) IV Push once  dextrose 50% Injectable 25 Gram(s) IV Push once  epoetin arian-epbx (RETACRIT) Injectable 3000 Unit(s) IV Push <User Schedule>  ertapenem  IVPB      ertapenem  IVPB 500 milliGRAM(s) IV Intermittent every 24 hours  glucagon  Injectable 1 milliGRAM(s) IntraMuscular once  heparin   Injectable 5000 Unit(s) SubCutaneous every 12 hours  insulin lispro (ADMELOG) corrective regimen sliding scale   SubCutaneous three times a day before meals  lisinopril 2.5 milliGRAM(s) Oral daily    MEDICATIONS  (PRN):  acetaminophen     Tablet .. 650 milliGRAM(s) Oral every 6 hours PRN Mild Pain (1 - 3)  dextrose Oral Gel 15 Gram(s) Oral once PRN Blood Glucose LESS THAN 70 milliGRAM(s)/deciliter  oxycodone    5 mG/acetaminophen 325 mG 1 Tablet(s) Oral every 4 hours PRN Moderate Pain (4 - 6)    	    PHYSICAL EXAM:  T(C): 36.4 (04-26-22 @ 12:27), Max: 36.7 (04-25-22 @ 21:12)  HR: 71 (04-26-22 @ 12:27) (58 - 80)  BP: 110/65 (04-26-22 @ 12:27) (107/55 - 144/71)  RR: 18 (04-26-22 @ 12:27) (15 - 18)  SpO2: 98% (04-26-22 @ 12:27) (97% - 100%)  Wt(kg): --  I&O's Summary    25 Apr 2022 07:01  -  26 Apr 2022 07:00  --------------------------------------------------------  IN: 200 mL / OUT: 0 mL / NET: 200 mL        Appearance: Normal	  HEENT:   Normal oral mucosa, PERRL, EOMI	  Lymphatic: No lymphadenopathy  Cardiovascular: Normal S1 S2, No JVD, No murmurs, No edema  Respiratory: Lungs clear to auscultation	  Psychiatry: A & O x 3, Mood & affect appropriate  Gastrointestinal:  Soft, Non-tender, + BS	    Neurologic: Non-focal  Extremities: foot dressed    TELEMETRY: 	    ECG:  	  RADIOLOGY:  OTHER: 	  	  LABS:	 	    CARDIAC MARKERS:                                9.0    5.57  )-----------( 194      ( 26 Apr 2022 07:20 )             26.7     04-26    139  |  95<L>  |  89<H>  ----------------------------<  99  4.9   |  20<L>  |  9.92<H>    Ca    9.3      26 Apr 2022 07:18      proBNP:   Lipid Profile:   HgA1c:   TSH:

## 2022-04-26 NOTE — PHYSICAL THERAPY INITIAL EVALUATION ADULT - GAIT DEVIATIONS NOTED, PT EVAL
decreased girish/increased time in double stance/decreased velocity of limb motion/decreased stride length/decreased weight-shifting ability

## 2022-04-26 NOTE — PROGRESS NOTE ADULT - ASSESSMENT
51 > s/p R foot partial 2nd toe amp  - pt seen and evaluated  - afebrile, no leukocytosis  -  s/p 4/25 R foot partial 2nd toe amputation, closed, sutures intact, no necrosis, no acute SOI  - low concern for residual infection, low concern for viability  - stable for discharge from podiatry standpoint  - follow up information and instructions can be found in the follow up section of the provider d/c note   - seen w/ attending

## 2022-04-26 NOTE — DISCHARGE NOTE NURSING/CASE MANAGEMENT/SOCIAL WORK - NSDCPNINST_GEN_ALL_CORE
call md for any discomforts felt-- cast shoe on right foot-- keep dressing on surgical site clean and dry

## 2022-04-26 NOTE — PHYSICAL THERAPY INITIAL EVALUATION ADULT - PRECAUTIONS/LIMITATIONS, REHAB EVAL
CONT: XRay R foot 4/21: Redemonstrated hallux amputation defect with stable intact 1st metatarsal stump remnant.New ulcerated 2nd toe soft tissues with exposed eroded underlying 2nd distal phalanx to base level consistent with osteomyelitis. No tracking gas collections beyond this region and no additional focal areas of osteomyelitis. No acute fractures or dislocations. Tarsometatarsal alignment maintained without evidence for a Lisfranc injury. Preserved remaining joint spaces and no joint margin erosions. Slightly osteopenic appearing osseous structures for age otherwise no discrete suspicious lytic or blastic lesions. Vascular calcifications again noted.

## 2022-04-26 NOTE — DISCHARGE NOTE NURSING/CASE MANAGEMENT/SOCIAL WORK - PATIENT PORTAL LINK FT
You can access the FollowMyHealth Patient Portal offered by [facility] by registering at the following website: [email or web address]. By joining [facility]’s FollowMyHealth portal, you will also be able to view your health information using other applications (apps) compatible with our system.

## 2022-04-26 NOTE — PROGRESS NOTE ADULT - ASSESSMENT
left foot ulcer   on anbx  s/p toe amputation   fu with podiatry , ID     CAD s/p cabg  stable   asa, statin    chronic systolic chf  stable  improving LV function   cont BB / ace  s/p ICD  interrogation noted     ESRD  On HD. follow up with renal. Monitor electrolytes, K, ca. Avoid significant nephrotoxic medications.    DM II  Monitor finger stick. Insulin coverage. Diabetic education and Diabetic diet. Consider nutrition consultation.

## 2022-04-26 NOTE — PROGRESS NOTE ADULT - SUBJECTIVE AND OBJECTIVE BOX
[facility] DIVISION OF KIDNEY DISEASES AND HYPERTENSION -- HEMODIALYSIS NOTE  --------------------------------------------------------------------------------  Chief Complaint: ESRD/Ongoing hemodialysis requirement    24 hour events/subjective: had right 2nd toe amputation yesterday        PAST HISTORY  --------------------------------------------------------------------------------  No significant changes to PMH, PSH, FHx, SHx, unless otherwise noted    ALLERGIES & MEDICATIONS  --------------------------------------------------------------------------------  Allergies    No Known Allergies    Intolerances      Standing Inpatient Medications  aspirin  chewable 81 milliGRAM(s) Oral daily  atorvastatin 40 milliGRAM(s) Oral at bedtime  carvedilol 6.25 milliGRAM(s) Oral every 12 hours  chlorhexidine 2% Cloths 1 Application(s) Topical <User Schedule>  cilostazol 50 milliGRAM(s) Oral every 12 hours  clopidogrel Tablet 75 milliGRAM(s) Oral daily  dextrose 5%. 1000 milliLiter(s) IV Continuous <Continuous>  dextrose 5%. 1000 milliLiter(s) IV Continuous <Continuous>  dextrose 50% Injectable 25 Gram(s) IV Push once  dextrose 50% Injectable 12.5 Gram(s) IV Push once  dextrose 50% Injectable 25 Gram(s) IV Push once  epoetin arian-epbx (RETACRIT) Injectable 3000 Unit(s) IV Push <User Schedule>  ertapenem  IVPB      ertapenem  IVPB 500 milliGRAM(s) IV Intermittent every 24 hours  glucagon  Injectable 1 milliGRAM(s) IntraMuscular once  heparin   Injectable 5000 Unit(s) SubCutaneous every 12 hours  insulin lispro (ADMELOG) corrective regimen sliding scale   SubCutaneous three times a day before meals  lisinopril 2.5 milliGRAM(s) Oral daily    PRN Inpatient Medications  acetaminophen     Tablet .. 650 milliGRAM(s) Oral every 6 hours PRN  dextrose Oral Gel 15 Gram(s) Oral once PRN  oxycodone    5 mG/acetaminophen 325 mG 1 Tablet(s) Oral every 4 hours PRN      REVIEW OF SYSTEMS  --------------------------------------------------------------------------------  Constitutional: [ ] Fever [ ] Chills [ ] Fatigue [ ] Weight change   HEENT: [ ] Blurred vision [ ] Eye Pain [ ] Headache [ ] Runny nose [ ] Sore Throat   Respiratory: [ ] Cough [ ] Wheezing [ ] Shortness of breath  Cardiovascular: [ ] Chest Pain [ ] Palpitations [ ] BRUCE [ ] PND [ ] Orthopnea  Gastrointestinal: [ ] Abdominal Pain [ ] Diarrhea [ ] Constipation [ ] Hemorrhoids [ ] Nausea [ ] Vomiting  Genitourinary: [ ] Nocturia [ ] Dysuria [ ] Incontinence  Extremities: [ ] Swelling [ ] Joint Pain  Neurologic: [ ] Focal deficit [ ] Paresthesias [ ] Syncope  Lymphatic: [ ] Swelling [ ] Lymphadenopathy   Skin: [ ] Rash [ ] Ecchymoses [ ] Wounds [ ] Lesions  Psychiatry: [ ] Depression [ ] Suicidal/Homicidal Ideation [ ] Anxiety [ ] Sleep Disturbances  [x ] 10 point review of systems is otherwise negative except as mentioned above              [ ]Unable to obtain due to   All other systems were reviewed and are negative, except as noted.      VITALS/PHYSICAL EXAM  --------------------------------------------------------------------------------  T(C): 36.4 (04-26-22 @ 12:27), Max: 36.7 (04-25-22 @ 21:12)  HR: 71 (04-26-22 @ 12:27) (58 - 80)  BP: 110/65 (04-26-22 @ 12:27) (107/55 - 144/71)  RR: 18 (04-26-22 @ 12:27) (15 - 18)  SpO2: 98% (04-26-22 @ 12:27) (97% - 100%)  Wt(kg): --  Height (cm): 175.3 (04-25-22 @ 13:02)  Weight (kg): 90.7 (04-25-22 @ 13:02)  BMI (kg/m2): 29.5 (04-25-22 @ 13:02)  BSA (m2): 2.07 (04-25-22 @ 13:02)      04-25-22 @ 07:01  -  04-26-22 @ 07:00  --------------------------------------------------------  IN: 200 mL / OUT: 0 mL / NET: 200 mL      Physical Exam:  	Gen: NAD, well-appearing  	HEENT: on room air  	Pulm: CTA B/L  	CV: normal S1S2; no rub  	Abd: soft                      Back : No sacral edema  	: No kalee  	LE: no edema  	Skin: Warm, without rashes  	Vascular access: CEDRICK JENNINGS in situ    LABS/STUDIES  --------------------------------------------------------------------------------              9.0    5.57  >-----------<  194      [04-26-22 @ 07:20]              26.7     139  |  95  |  89  ----------------------------<  99      [04-26-22 @ 07:18]  4.9   |  20  |  9.92        Ca     9.3     [04-26-22 @ 07:18]      PT/INR: PT 13.9 , INR 1.21       [04-25-22 @ 05:27]  PTT: 33.1       [04-25-22 @ 05:27]

## 2022-04-26 NOTE — PHYSICAL THERAPY INITIAL EVALUATION ADULT - PERTINENT HX OF CURRENT PROBLEM, REHAB EVAL
50 yo M  PMH of DM, ESRD on hd  cad/ cabg  chf, , presents to ED from podiatry office for eval of R 2nd toe. Pt states has developed a wound and pain at the second toe x1 week. Denies fever, chills. saw Dr. Myers today who noted bon exposed at 2nd toe distal phalanx and referred to ER for vascular/ID clearance for toe amputation. Now s/p R foot partial 2nd digit amputation 4/25, good Intraop bleeding, good bone quality at the level of resection, low concern for residual infection or viability

## 2022-04-30 LAB
-  AMPICILLIN: SIGNIFICANT CHANGE UP
-  TETRACYCLINE: SIGNIFICANT CHANGE UP
-  VANCOMYCIN: SIGNIFICANT CHANGE UP
CULTURE RESULTS: SIGNIFICANT CHANGE UP
METHOD TYPE: SIGNIFICANT CHANGE UP
ORGANISM # SPEC MICROSCOPIC CNT: SIGNIFICANT CHANGE UP
ORGANISM # SPEC MICROSCOPIC CNT: SIGNIFICANT CHANGE UP
SPECIMEN SOURCE: SIGNIFICANT CHANGE UP

## 2022-05-02 LAB — SURGICAL PATHOLOGY STUDY: SIGNIFICANT CHANGE UP

## 2022-06-20 ENCOUNTER — APPOINTMENT (OUTPATIENT)
Dept: TRANSPLANT | Facility: CLINIC | Age: 52
End: 2022-06-20
Payer: MEDICARE

## 2022-06-20 ENCOUNTER — NON-APPOINTMENT (OUTPATIENT)
Age: 52
End: 2022-06-20

## 2022-06-20 ENCOUNTER — APPOINTMENT (OUTPATIENT)
Dept: NEPHROLOGY | Facility: CLINIC | Age: 52
End: 2022-06-20
Payer: COMMERCIAL

## 2022-06-20 VITALS
HEART RATE: 82 BPM | SYSTOLIC BLOOD PRESSURE: 127 MMHG | TEMPERATURE: 206.6 F | WEIGHT: 196 LBS | RESPIRATION RATE: 15 BRPM | DIASTOLIC BLOOD PRESSURE: 74 MMHG | OXYGEN SATURATION: 98 % | BODY MASS INDEX: 29.03 KG/M2 | HEIGHT: 69 IN

## 2022-06-20 PROCEDURE — 99072 ADDL SUPL MATRL&STAF TM PHE: CPT

## 2022-06-20 PROCEDURE — 99215 OFFICE O/P EST HI 40 MIN: CPT

## 2022-06-20 PROCEDURE — 99204 OFFICE O/P NEW MOD 45 MIN: CPT

## 2022-06-20 NOTE — PHYSICAL EXAM
[General Appearance - Alert] : alert [General Appearance - In No Acute Distress] : in no acute distress [General Appearance - Well Nourished] : well nourished [Sclera] : the sclera and conjunctiva were normal [Extraocular Movements] : extraocular movements were intact [Outer Ear] : the ears and nose were normal in appearance [Neck Appearance] : the appearance of the neck was normal [Neck Cervical Mass (___cm)] : no neck mass was observed [Respiration, Rhythm And Depth] : normal respiratory rhythm and effort [Exaggerated Use Of Accessory Muscles For Inspiration] : no accessory muscle use [Auscultation Breath Sounds / Voice Sounds] : lungs were clear to auscultation bilaterally [Heart Rate And Rhythm] : heart rate was normal and rhythm regular [Heart Sounds] : normal S1 and S2 [Edema] : there was no peripheral edema [Bowel Sounds] : normal bowel sounds [Abdomen Soft] : soft [Abdomen Tenderness] : non-tender [Cervical Lymph Nodes Enlarged Posterior Bilaterally] : posterior cervical [Cervical Lymph Nodes Enlarged Anterior Bilaterally] : anterior cervical [Supraclavicular Lymph Nodes Enlarged Bilaterally] : supraclavicular [Musculoskeletal - Swelling] : no joint swelling seen [FreeTextEntry1] : walks stooped with limp.  Right foot amputation in boot.  [Skin Color & Pigmentation] : normal skin color and pigmentation [Skin Turgor] : normal skin turgor [] : no rash [Oriented To Time, Place, And Person] : oriented to person, place, and time [Impaired Insight] : insight and judgment were intact [Affect] : the affect was normal

## 2022-06-20 NOTE — PLAN
[FreeTextEntry1] : 1.  ESRD on dialysis - Mr. Salvador is a fair candidate for kidney transplantation.  Though young he has several macrovascular complications of diabetes and ESRD including PVD, CAD s/p CABG and right hallux and right second toe partial resection.  He has no living donors but does have 5 years of dialysis time.  \par 2.  Diabetes - multiple complications.  Check A1c\par 3.  CAD - pt s/p CABG, has AICD and EF 40%.  Will need cardiology clearance\par 4.  PVD - s/p stents in right leg on plavix, aspirin and pletal.  Will need to hold plavix before surgery.  Need to discuss with vascular and will need CTA of iliac arteries with run off. \par 5.  Cancer screening - will need colonoscopy.\par \par I have personally discussed the risks and benefits of transplantation and patient attended transplant education class where the following was disclosed:\par  \par Reviewed factors affecting survival and morbidity while on dialysis, the transplant wait list and reviewed carmen-operative and long-term risk factors affecting outcome in kidney transplantation.  \par  \par One year SRTR outcomes for national and [facility] were discussed in regards to patient survival and graft survival after transplantation.  \par  \par Details of transplant surgery, including complications were discussed.\par Immunosuppression and complications including infection including life threatening sepsis and opportunistic infections, malignancy and new onset diabetes were discussed.  \par  \par Benefits of live donor transplantation as well as variability in wait times across regions and multiple listing were discussed. \par KDPI >85% and PHS high risk criteria donors were discussed. \par HCV kidney transplantation was discussed.\par  \par Will proceed with completing/ updating work up and listing for transplant/ live donor transplant once work up is reviewed and found to be acceptable by multidisciplinary listing committee.\par \par

## 2022-06-20 NOTE — HISTORY OF PRESENT ILLNESS
[Diabetes Mellitus] : Diabetes Mellitus [TextBox_42] : 52 year old  male with ESRD on HD who presents for kidney transplant evaluation.  His nephrologist is Dr. Bharath Romo.  \par HD via left forearm avf TTS since 2016.  Developed ESRD about 6 months after CABGx4 (surgery in [facility]).  \par He has minimal urine output.  \par ESRD is secondary to diabetes.  Pt has had diabetes diagnosed in 2016 but was told he had it much longer.  He is currently on Januvia, never insulin.  He has mild neuropathy.  Denies retinopathy.  He had a right hallux resection - osteomyelitis 1/17/22.  He has PVD and is s/p 4 stents in right leg: mid and distal right superficial femoral artery, right popliteal x2 on  1/14/22.\par covid infection - mild infection, vaccinated\par Passed a kidney stone once. \par No cancer history.  \par He has never had a transplant evaluation.  \par \par PMHx\par cardiac - CHF, CABGx4 and ICD 2016 - no chest pain\par Echo 1/14/22 - EF 40%, normal pulmonary pressures\par \par PSH\par PVD - 1/14/22 - 4 stents in right leg: mid and distal right superficial femoral artery, right popliteal x2\par Right Hallux resection - osteomyelitis 1/17/22.  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  \par \par Medications reviewed\par includes plavix and aspirin\par \par SHx\par lives with sister (suffers from MS) and mother (81yo)\par self-caring, does own cooking, cleaning\par drives\par ex-smoker stopped 5y ago\par stopped etoh 5y ago

## 2022-06-20 NOTE — REVIEW OF SYSTEMS
[Joint Pain] : joint pain [Negative] : Heme/Lymph [FreeTextEntry5] : stents in right leg [FreeTextEntry8] : does not urinate [FreeTextEntry9] : chronic back pain. right foot amputation.  [de-identified] : neuropathy of feet

## 2022-06-21 ENCOUNTER — NON-APPOINTMENT (OUTPATIENT)
Age: 52
End: 2022-06-21

## 2022-06-21 LAB
ABO + RH PNL BLD: NORMAL
ABO + RH PNL BLD: NORMAL
ALBUMIN SERPL ELPH-MCNC: 4.5 G/DL
ALP BLD-CCNC: 93 U/L
ALT SERPL-CCNC: 8 U/L
ANION GAP SERPL CALC-SCNC: 18 MMOL/L
AST SERPL-CCNC: 12 U/L
BASOPHILS # BLD AUTO: 0.03 K/UL
BASOPHILS NFR BLD AUTO: 0.5 %
BILIRUB SERPL-MCNC: 0.2 MG/DL
BUN SERPL-MCNC: 70 MG/DL
CALCIUM SERPL-MCNC: 9.6 MG/DL
CHLORIDE SERPL-SCNC: 97 MMOL/L
CHOLEST SERPL-MCNC: 111 MG/DL
CMV IGG SERPL QL: 7.8 U/ML
CMV IGG SERPL-IMP: POSITIVE
CO2 SERPL-SCNC: 27 MMOL/L
COVID-19 NUCLEOCAPSID  GAM ANTIBODY INTERPRETATION: POSITIVE
COVID-19 SPIKE DOMAIN ANTIBODY INTERPRETATION: POSITIVE
CREAT SERPL-MCNC: 8.73 MG/DL
EBV EA AB SER IA-ACNC: <5 U/ML
EBV EA AB TITR SER IF: POSITIVE
EBV EA IGG SER QL IA: >600 U/ML
EBV EA IGG SER-ACNC: NEGATIVE
EBV EA IGM SER IA-ACNC: NEGATIVE
EBV PATRN SPEC IB-IMP: NORMAL
EBV VCA IGG SER IA-ACNC: >750 U/ML
EBV VCA IGM SER QL IA: <10 U/ML
EGFR: 7 ML/MIN/1.73M2
EOSINOPHIL # BLD AUTO: 0.19 K/UL
EOSINOPHIL NFR BLD AUTO: 3.1 %
EPSTEIN-BARR VIRUS CAPSID ANTIGEN IGG: POSITIVE
ESTIMATED AVERAGE GLUCOSE: 100 MG/DL
GLUCOSE SERPL-MCNC: 95 MG/DL
HAV IGM SER QL: NONREACTIVE
HBA1C MFR BLD HPLC: 5.1 %
HBV CORE IGG+IGM SER QL: NONREACTIVE
HBV SURFACE AB SER QL: REACTIVE
HBV SURFACE AB SERPL IA-ACNC: 34.7 MIU/ML
HBV SURFACE AG SER QL: NONREACTIVE
HCT VFR BLD CALC: 30.4 %
HCV AB SER QL: NONREACTIVE
HCV S/CO RATIO: 0.2 S/CO
HDLC SERPL-MCNC: 43 MG/DL
HEPATITIS A IGG ANTIBODY: NONREACTIVE
HGB BLD-MCNC: 9.5 G/DL
HIV1+2 AB SPEC QL IA.RAPID: NONREACTIVE
HSV 1+2 IGG SER IA-IMP: NEGATIVE
HSV 1+2 IGG SER IA-IMP: POSITIVE
HSV1 IGG SER QL: 47.8 INDEX
HSV2 IGG SER QL: 0.11 INDEX
IMM GRANULOCYTES NFR BLD AUTO: 0.5 %
LDLC SERPL CALC-MCNC: 53 MG/DL
LYMPHOCYTES # BLD AUTO: 0.67 K/UL
LYMPHOCYTES NFR BLD AUTO: 10.8 %
MAGNESIUM SERPL-MCNC: 2.7 MG/DL
MAN DIFF?: NORMAL
MCHC RBC-ENTMCNC: 31.3 GM/DL
MCHC RBC-ENTMCNC: 31.4 PG
MCV RBC AUTO: 100.3 FL
MONOCYTES # BLD AUTO: 0.57 K/UL
MONOCYTES NFR BLD AUTO: 9.2 %
NEUTROPHILS # BLD AUTO: 4.7 K/UL
NEUTROPHILS NFR BLD AUTO: 75.9 %
NONHDLC SERPL-MCNC: 68 MG/DL
PHOSPHATE SERPL-MCNC: 6.7 MG/DL
PLATELET # BLD AUTO: 217 K/UL
POTASSIUM SERPL-SCNC: 4.5 MMOL/L
PROT SERPL-MCNC: 7.3 G/DL
PSA SERPL-MCNC: 0.77 NG/ML
RBC # BLD: 3.03 M/UL
RBC # FLD: 14.2 %
RUBV IGG FLD-ACNC: 4.5 INDEX
RUBV IGG SER-IMP: POSITIVE
SARS-COV-2 AB SERPL IA-ACNC: >250 U/ML
SARS-COV-2 AB SERPL QL IA: 1.46 INDEX
SARS-COV-2 N GENE NPH QL NAA+PROBE: NOT DETECTED
SODIUM SERPL-SCNC: 142 MMOL/L
T GONDII AB SER-IMP: NEGATIVE
T GONDII IGG SER QL: <3 IU/ML
T PALLIDUM AB SER QL IA: NEGATIVE
TRIGL SERPL-MCNC: 73 MG/DL
VZV AB TITR SER: POSITIVE
VZV IGG SER IF-ACNC: 2361 INDEX
WBC # FLD AUTO: 6.19 K/UL

## 2022-06-22 LAB
CMV DNA SPEC QL NAA+PROBE: NOT DETECTED IU/ML
CMVPCR LOG: NOT DETECTED LOG10IU/ML

## 2022-06-22 NOTE — PHYSICAL EXAM
[No Acute Distress] : no acute distress [Sclera Anicteric] : sclera anicteric [Clear to Auscultation] : clear to auscultation [Breathing Comfortably on RA] : breathing comfortably on room air [Normal Rate] : normal rate [Soft] : soft [Non-tender] : non-tender [In Left Arm] : fistula/graft in left arm [] : right dorsalis pedis non-palpable [Normal] : normal [FreeTextEntry1] : No oral thrush [TextBox_34] : Right 1st and 2nd toe amputations, No ulceration or wound breakdown. Healing well. Left 5th toe amputaiton [TextBox_86] : No lymphadenopathy

## 2022-06-22 NOTE — END OF VISIT
[Time Spent: ___ minutes] : I have spent [unfilled] minutes of time on the encounter. [>50% of the face to face encounter time was spent on counseling and/or coordination of care for ___] : Greater than 50% of the face to face encounter time was spent on counseling and/or coordination of care for [unfilled] [Attestation] : We have discussed with the patient at length the risks and benefits of transplantation. The patient is aware that transplantation is a process that requires a life-long commitment, and that it is a personal choice to proceed with it rather than to remain with alternative treatments such as dialysis.  We discussed the differences in quality of life and patient survival between remaining on dialysis versus receiving a kidney transplant. We also discussed increased benefits of receiving a live donor kidney transplant versus a  donor kidney transplant. We discussed the risks of kidney transplantation in depth. Surgical risks included but were not limited to bleeding, infection, graft thrombosis, ureteral and vascular strictures, delayed graft function, urine leak, the development of fluid collections, cardiac events, damage to native blood vessels and potential need for re operation postoperatively. We also discussed the risks of postoperative immunosuppression including but not limited to increased risk of infection, cancer, weight gain, new onset or worsening of diabetes or hypertension on a temporary or permanent basis, water retention, back pain, constipation, diarrhea, dizziness, headache, joint pain, loss of appetite, nausea, stomach pain or upset, trouble sleeping, vomiting, and/or mental or mood changes were.  The patient also understands that there is a risk of recurrent primary kidney disease in the transplanted organ. We also discussed the risks and benefits of receiving a kidney from a donor with a Kidney Donor Profile Index (KDPI) score greater than 85% including a greater risk of delayed graft function, increased need for dialysis within the first 2-3 weeks after transplant, and statistically lower graft survival at 3 years. We discussed the one-year observed and expected patient and graft survival rates in comparison to the national one-year averages as described in the evaluation consent forms. Patient consent is in the chart. Patient is aware that they may withdraw their consent for transplantation at any time. I have answered all of the patient's questions as well as all questions of those present with the patient.  At the culmination of the patient’s transplant candidacy workup, the patient will be presented to the Multidisciplinary Transplant Selection Committee for a decision whether to proceed with listing and transplantation.

## 2022-06-22 NOTE — HISTORY OF PRESENT ILLNESS
[Diabetes Mellitus] : Diabetes Mellitus [Hypertension] : Hypertension [Cardiac History] : cardiac history [Diabetes] : diabetes [Previous Kidney Transplant] : no previous kidney transplant [Hx of DVT/Thrombosis/Miscarriage] : no history of dvt/thrombosis/miscarriage [Lower Extremity Ulcers] : no lower extremity ulcers [TextBox_30] : 1/2 block [de-identified] : 52 year old male with ESRD on HD, previously seen in February for transplant evaluation but not listed; here for followup. Nephrologist - Dr. Bharath Romo. \par HD via left forearm AVF TTS at [facility] since 2016. Developed ESRD about 6 months after CABGx4 \par He had angio of LUE AVF last week and had an allergic reaction to the contrast; currently improving\par \par ESRD is secondary to diabetes. Pt has had diabetes diagnosed in 2016 but was told he had it much longer. He is currently on Januvia, never insulin. He has mild neuropathy. Denies retinopathy. He had a right hallux resection for osteomyelitis 1/17/22. He has PVD and is s/p 4 stents in right leg, most recently a few months ago: mid and distal right superficial femoral artery, right popliteal x2 on 1/14/22.\par \par \par PMHx\par DM\par cardiac - CHF, CABGx4 and ICD 2016 - no chest pain\par Echo 1/14/22 - EF 40%, normal pulmonary pressures\par \par PSH\par PVD - 1/14/22 - 4 stents in right leg: mid and distal right superficial femoral artery, right popliteal x2\par Right Hallux resection - osteomyelitis 1/17/22. 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 \par \par Medications reviewed\par includes plavix and aspirin\par \par SHx\par lives with sister (suffers from MS) and mother (79yo)\par self-caring, does own cooking, cleaning\par drives\par ex-smoker stopped 5y ago\par stopped etoh 5y ago \par \par Makes minimal amount of urine. Passed kidney stone several years ago, none recent

## 2022-06-22 NOTE — ASSESSMENT
[Fair candidate] : a fair candidate. We should proceed with our protocol for evaluation for kidney transplantation. [FreeTextEntry1] : PAD on ASA and plavix. 4 stents in RLE starting in mid-femoral artery\par Concern about bilateral LE blood flow and risks with placing kidney  causing steal\par Needs CT angio b/l LE\par cardiac eval

## 2022-06-23 LAB
EBV DNA SERPL NAA+PROBE-ACNC: NOT DETECTED IU/ML
EBVPCR LOG: NOT DETECTED LOG10IU/ML

## 2022-06-27 ENCOUNTER — APPOINTMENT (OUTPATIENT)
Dept: CT IMAGING | Facility: IMAGING CENTER | Age: 52
End: 2022-06-27

## 2022-07-06 ENCOUNTER — NON-APPOINTMENT (OUTPATIENT)
Age: 52
End: 2022-07-06

## 2022-07-07 ENCOUNTER — NON-APPOINTMENT (OUTPATIENT)
Age: 52
End: 2022-07-07

## 2022-07-15 ENCOUNTER — APPOINTMENT (OUTPATIENT)
Dept: CT IMAGING | Facility: IMAGING CENTER | Age: 52
End: 2022-07-15

## 2022-07-15 ENCOUNTER — OUTPATIENT (OUTPATIENT)
Dept: OUTPATIENT SERVICES | Facility: HOSPITAL | Age: 52
LOS: 1 days | End: 2022-07-15
Payer: COMMERCIAL

## 2022-07-15 DIAGNOSIS — Z95.810 PRESENCE OF AUTOMATIC (IMPLANTABLE) CARDIAC DEFIBRILLATOR: Chronic | ICD-10-CM

## 2022-07-15 DIAGNOSIS — Z01.818 ENCOUNTER FOR OTHER PREPROCEDURAL EXAMINATION: ICD-10-CM

## 2022-07-15 DIAGNOSIS — Z89.422 ACQUIRED ABSENCE OF OTHER LEFT TOE(S): Chronic | ICD-10-CM

## 2022-07-15 DIAGNOSIS — Z95.1 PRESENCE OF AORTOCORONARY BYPASS GRAFT: Chronic | ICD-10-CM

## 2022-07-15 PROCEDURE — 74176 CT ABD & PELVIS W/O CONTRAST: CPT | Mod: 26

## 2022-07-15 PROCEDURE — 71250 CT THORAX DX C-: CPT

## 2022-07-15 PROCEDURE — 71250 CT THORAX DX C-: CPT | Mod: 26

## 2022-07-15 PROCEDURE — 74176 CT ABD & PELVIS W/O CONTRAST: CPT

## 2022-07-20 ENCOUNTER — APPOINTMENT (OUTPATIENT)
Dept: CARDIOLOGY | Facility: CLINIC | Age: 52
End: 2022-07-20

## 2022-07-20 ENCOUNTER — NON-APPOINTMENT (OUTPATIENT)
Age: 52
End: 2022-07-20

## 2022-07-20 VITALS
HEART RATE: 99 BPM | WEIGHT: 202 LBS | OXYGEN SATURATION: 96 % | BODY MASS INDEX: 29.92 KG/M2 | SYSTOLIC BLOOD PRESSURE: 120 MMHG | HEIGHT: 69 IN | DIASTOLIC BLOOD PRESSURE: 70 MMHG

## 2022-07-20 PROCEDURE — 93000 ELECTROCARDIOGRAM COMPLETE: CPT

## 2022-07-20 PROCEDURE — 99072 ADDL SUPL MATRL&STAF TM PHE: CPT

## 2022-07-20 PROCEDURE — 99205 OFFICE O/P NEW HI 60 MIN: CPT

## 2022-07-20 RX ORDER — BETAMETHASONE DIPROPIONATE 0.5 MG/G
0.05 CREAM TOPICAL
Qty: 45 | Refills: 0 | Status: COMPLETED | COMMUNITY
Start: 2022-07-13

## 2022-07-20 RX ORDER — PREDNISONE 50 MG/1
50 TABLET ORAL
Qty: 3 | Refills: 0 | Status: COMPLETED | COMMUNITY
Start: 2022-06-21

## 2022-07-20 RX ORDER — MUPIROCIN 20 MG/G
2 OINTMENT TOPICAL
Qty: 22 | Refills: 0 | Status: COMPLETED | COMMUNITY
Start: 2022-05-26

## 2022-07-20 RX ORDER — AMOXICILLIN AND CLAVULANATE POTASSIUM 875; 125 MG/1; MG/1
875-125 TABLET, COATED ORAL
Qty: 8 | Refills: 0 | Status: COMPLETED | COMMUNITY
Start: 2022-04-26

## 2022-07-20 RX ORDER — TRIAMCINOLONE ACETONIDE 1 MG/G
0.1 CREAM TOPICAL
Qty: 30 | Refills: 0 | Status: COMPLETED | COMMUNITY
Start: 2022-07-02

## 2022-07-20 RX ORDER — CLOTRIMAZOLE 10 MG/G
1 CREAM TOPICAL
Qty: 30 | Refills: 0 | Status: COMPLETED | COMMUNITY
Start: 2022-07-13

## 2022-07-20 NOTE — REASON FOR VISIT
[Coronary Artery Disease] : coronary artery disease [Carotid, Aortic and Peripheral Vascular Disease] : carotid, aortic and peripheral vascular disease [Other: ____] : [unfilled]

## 2022-07-20 NOTE — PHYSICAL EXAM
[Not Palpable] : not palpable [Normal Rate] : normal [Rhythm Regular] : regular [Normal S1] : normal S1 [Normal S2] : normal S2 [No Murmur] : no murmurs heard [No Pitting Edema] : no pitting edema present [2+] : left 2+ [0] : left 0 [Rash] : rash [Normal] : alert and oriented, normal memory [Right Carotid Bruit] : no bruit heard over the right carotid [Left Carotid Bruit] : no bruit heard over the left carotid [de-identified] : right infraclavicular ICD pocket and midline sternotomy with cheloid [de-identified] : right first toe amputation [de-identified] : chronic stasis changes of lower legs

## 2022-07-20 NOTE — HISTORY OF PRESENT ILLNESS
[FreeTextEntry1] : Mr. Tee Salvador is a 52 year old man presenting for cardiovascular evaluation in anticipation of kidney transplant. End stage renal disease is attributed to diabetes. He had coronary revascularization surgyer in 2016 and required dialysis shortly thereafter. He also has peripheral vascular disease with peripheral arterial stents. He has a defibrillator that was placed around time of heart surgery. Can walk few blocks before stopping for fatigue and can climb flights of stairs. His cardiologist is Dr. Jose Braga.

## 2022-07-20 NOTE — CARDIOLOGY SUMMARY
[de-identified] : 7/20/2022 sinus rhythm, LAHB, old anterior wall MI. [de-identified] : 1/14/2022 moderate global left ventricular systolic dysfunction, EF 40%. Normal valves and normal right heart. [de-identified] : 4/26/2017 ICD [de-identified] : 11/23/2016 CARVAJAL to LAD, right ANÍBAL to RCA, SVGs to diagonal and ramus.

## 2022-07-21 ENCOUNTER — INPATIENT (INPATIENT)
Facility: HOSPITAL | Age: 52
LOS: 1 days | Discharge: HOME | DRG: 673 | End: 2022-07-23
Attending: INTERNAL MEDICINE | Admitting: INTERNAL MEDICINE
Payer: MEDICARE

## 2022-07-21 VITALS
HEIGHT: 69 IN | SYSTOLIC BLOOD PRESSURE: 117 MMHG | TEMPERATURE: 98 F | OXYGEN SATURATION: 99 % | HEART RATE: 75 BPM | RESPIRATION RATE: 18 BRPM | DIASTOLIC BLOOD PRESSURE: 68 MMHG | WEIGHT: 199.96 LBS

## 2022-07-21 DIAGNOSIS — Z95.810 PRESENCE OF AUTOMATIC (IMPLANTABLE) CARDIAC DEFIBRILLATOR: Chronic | ICD-10-CM

## 2022-07-21 DIAGNOSIS — I50.23 ACUTE ON CHRONIC SYSTOLIC (CONGESTIVE) HEART FAILURE: ICD-10-CM

## 2022-07-21 DIAGNOSIS — Z95.1 PRESENCE OF AORTOCORONARY BYPASS GRAFT: Chronic | ICD-10-CM

## 2022-07-21 DIAGNOSIS — N18.6 END STAGE RENAL DISEASE: ICD-10-CM

## 2022-07-21 DIAGNOSIS — I25.10 ATHEROSCLEROTIC HEART DISEASE OF NATIVE CORONARY ARTERY WITHOUT ANGINA PECTORIS: ICD-10-CM

## 2022-07-21 DIAGNOSIS — E87.5 HYPERKALEMIA: ICD-10-CM

## 2022-07-21 DIAGNOSIS — E11.9 TYPE 2 DIABETES MELLITUS WITHOUT COMPLICATIONS: ICD-10-CM

## 2022-07-21 DIAGNOSIS — I73.9 PERIPHERAL VASCULAR DISEASE, UNSPECIFIED: ICD-10-CM

## 2022-07-21 DIAGNOSIS — Z89.422 ACQUIRED ABSENCE OF OTHER LEFT TOE(S): Chronic | ICD-10-CM

## 2022-07-21 DIAGNOSIS — T82.590A OTHER MECHANICAL COMPLICATION OF SURGICALLY CREATED ARTERIOVENOUS FISTULA, INITIAL ENCOUNTER: ICD-10-CM

## 2022-07-21 LAB
ALBUMIN SERPL ELPH-MCNC: 4.3 G/DL — SIGNIFICANT CHANGE UP (ref 3.3–5)
ALP SERPL-CCNC: 96 U/L — SIGNIFICANT CHANGE UP (ref 40–120)
ALT FLD-CCNC: 8 U/L — LOW (ref 10–45)
ANION GAP SERPL CALC-SCNC: 18 MMOL/L — HIGH (ref 5–17)
ANION GAP SERPL CALC-SCNC: 20 MMOL/L — HIGH (ref 5–17)
APTT BLD: 30.8 SEC — SIGNIFICANT CHANGE UP (ref 27.5–35.5)
AST SERPL-CCNC: 10 U/L — SIGNIFICANT CHANGE UP (ref 10–40)
BASE EXCESS BLDV CALC-SCNC: -2 MMOL/L — SIGNIFICANT CHANGE UP (ref -2–2)
BASOPHILS # BLD AUTO: 0.03 K/UL — SIGNIFICANT CHANGE UP (ref 0–0.2)
BASOPHILS NFR BLD AUTO: 0.4 % — SIGNIFICANT CHANGE UP (ref 0–2)
BILIRUB SERPL-MCNC: 0.3 MG/DL — SIGNIFICANT CHANGE UP (ref 0.2–1.2)
BLD GP AB SCN SERPL QL: NEGATIVE — SIGNIFICANT CHANGE UP
BUN SERPL-MCNC: 102 MG/DL — HIGH (ref 7–23)
BUN SERPL-MCNC: 99 MG/DL — HIGH (ref 7–23)
CA-I SERPL-SCNC: 1.13 MMOL/L — LOW (ref 1.15–1.33)
CALCIUM SERPL-MCNC: 8.3 MG/DL — LOW (ref 8.4–10.5)
CALCIUM SERPL-MCNC: 9.4 MG/DL — SIGNIFICANT CHANGE UP (ref 8.4–10.5)
CHLORIDE BLDV-SCNC: 101 MMOL/L — SIGNIFICANT CHANGE UP (ref 96–108)
CHLORIDE SERPL-SCNC: 100 MMOL/L — SIGNIFICANT CHANGE UP (ref 96–108)
CHLORIDE SERPL-SCNC: 98 MMOL/L — SIGNIFICANT CHANGE UP (ref 96–108)
CO2 BLDV-SCNC: 27 MMOL/L — HIGH (ref 22–26)
CO2 SERPL-SCNC: 22 MMOL/L — SIGNIFICANT CHANGE UP (ref 22–31)
CO2 SERPL-SCNC: 23 MMOL/L — SIGNIFICANT CHANGE UP (ref 22–31)
CREAT SERPL-MCNC: 11.68 MG/DL — HIGH (ref 0.5–1.3)
CREAT SERPL-MCNC: 11.72 MG/DL — HIGH (ref 0.5–1.3)
EGFR: 5 ML/MIN/1.73M2 — LOW
EGFR: 5 ML/MIN/1.73M2 — LOW
EOSINOPHIL # BLD AUTO: 0 K/UL — SIGNIFICANT CHANGE UP (ref 0–0.5)
EOSINOPHIL NFR BLD AUTO: 0 % — SIGNIFICANT CHANGE UP (ref 0–6)
FLUAV AG NPH QL: SIGNIFICANT CHANGE UP
FLUBV AG NPH QL: SIGNIFICANT CHANGE UP
GAS PNL BLDV: 137 MMOL/L — SIGNIFICANT CHANGE UP (ref 136–145)
GAS PNL BLDV: SIGNIFICANT CHANGE UP
GAS PNL BLDV: SIGNIFICANT CHANGE UP
GLUCOSE BLDC GLUCOMTR-MCNC: 119 MG/DL — HIGH (ref 70–99)
GLUCOSE BLDC GLUCOMTR-MCNC: 159 MG/DL — HIGH (ref 70–99)
GLUCOSE BLDV-MCNC: 115 MG/DL — HIGH (ref 70–99)
GLUCOSE SERPL-MCNC: 105 MG/DL — HIGH (ref 70–99)
GLUCOSE SERPL-MCNC: 129 MG/DL — HIGH (ref 70–99)
HCO3 BLDV-SCNC: 25 MMOL/L — SIGNIFICANT CHANGE UP (ref 22–29)
HCT VFR BLD CALC: 33.5 % — LOW (ref 39–50)
HCT VFR BLDA CALC: 32 % — LOW (ref 39–51)
HGB BLD CALC-MCNC: 10.7 G/DL — LOW (ref 12.6–17.4)
HGB BLD-MCNC: 10.6 G/DL — LOW (ref 13–17)
IMM GRANULOCYTES NFR BLD AUTO: 0.8 % — SIGNIFICANT CHANGE UP (ref 0–1.5)
INR BLD: 1.05 RATIO — SIGNIFICANT CHANGE UP (ref 0.88–1.16)
LACTATE BLDV-MCNC: 1.3 MMOL/L — SIGNIFICANT CHANGE UP (ref 0.7–2)
LYMPHOCYTES # BLD AUTO: 0.81 K/UL — LOW (ref 1–3.3)
LYMPHOCYTES # BLD AUTO: 11.1 % — LOW (ref 13–44)
MCHC RBC-ENTMCNC: 31.2 PG — SIGNIFICANT CHANGE UP (ref 27–34)
MCHC RBC-ENTMCNC: 31.6 GM/DL — LOW (ref 32–36)
MCV RBC AUTO: 98.5 FL — SIGNIFICANT CHANGE UP (ref 80–100)
MONOCYTES # BLD AUTO: 0.53 K/UL — SIGNIFICANT CHANGE UP (ref 0–0.9)
MONOCYTES NFR BLD AUTO: 7.3 % — SIGNIFICANT CHANGE UP (ref 2–14)
NEUTROPHILS # BLD AUTO: 5.85 K/UL — SIGNIFICANT CHANGE UP (ref 1.8–7.4)
NEUTROPHILS NFR BLD AUTO: 80.4 % — HIGH (ref 43–77)
NRBC # BLD: 0 /100 WBCS — SIGNIFICANT CHANGE UP (ref 0–0)
PCO2 BLDV: 52 MMHG — SIGNIFICANT CHANGE UP (ref 42–55)
PH BLDV: 7.29 — LOW (ref 7.32–7.43)
PLATELET # BLD AUTO: 174 K/UL — SIGNIFICANT CHANGE UP (ref 150–400)
PO2 BLDV: 24 MMHG — LOW (ref 25–45)
POTASSIUM BLDV-SCNC: 5.8 MMOL/L — HIGH (ref 3.5–5.1)
POTASSIUM SERPL-MCNC: 5.1 MMOL/L — SIGNIFICANT CHANGE UP (ref 3.5–5.3)
POTASSIUM SERPL-MCNC: 5.9 MMOL/L — HIGH (ref 3.5–5.3)
POTASSIUM SERPL-SCNC: 5.1 MMOL/L — SIGNIFICANT CHANGE UP (ref 3.5–5.3)
POTASSIUM SERPL-SCNC: 5.9 MMOL/L — HIGH (ref 3.5–5.3)
PROT SERPL-MCNC: 7.4 G/DL — SIGNIFICANT CHANGE UP (ref 6–8.3)
PROTHROM AB SERPL-ACNC: 12.2 SEC — SIGNIFICANT CHANGE UP (ref 10.5–13.4)
RBC # BLD: 3.4 M/UL — LOW (ref 4.2–5.8)
RBC # FLD: 13.9 % — SIGNIFICANT CHANGE UP (ref 10.3–14.5)
RH IG SCN BLD-IMP: POSITIVE — SIGNIFICANT CHANGE UP
RSV RNA NPH QL NAA+NON-PROBE: SIGNIFICANT CHANGE UP
SAO2 % BLDV: 24.4 % — LOW (ref 67–88)
SARS-COV-2 RNA SPEC QL NAA+PROBE: SIGNIFICANT CHANGE UP
SODIUM SERPL-SCNC: 140 MMOL/L — SIGNIFICANT CHANGE UP (ref 135–145)
SODIUM SERPL-SCNC: 141 MMOL/L — SIGNIFICANT CHANGE UP (ref 135–145)
WBC # BLD: 7.28 K/UL — SIGNIFICANT CHANGE UP (ref 3.8–10.5)
WBC # FLD AUTO: 7.28 K/UL — SIGNIFICANT CHANGE UP (ref 3.8–10.5)

## 2022-07-21 PROCEDURE — 99285 EMERGENCY DEPT VISIT HI MDM: CPT

## 2022-07-21 RX ORDER — GLUCAGON INJECTION, SOLUTION 0.5 MG/.1ML
1 INJECTION, SOLUTION SUBCUTANEOUS ONCE
Refills: 0 | Status: DISCONTINUED | OUTPATIENT
Start: 2022-07-21 | End: 2022-07-23

## 2022-07-21 RX ORDER — SODIUM ZIRCONIUM CYCLOSILICATE 10 G/10G
15 POWDER, FOR SUSPENSION ORAL ONCE
Refills: 0 | Status: COMPLETED | OUTPATIENT
Start: 2022-07-21 | End: 2022-07-21

## 2022-07-21 RX ORDER — SODIUM CHLORIDE 9 MG/ML
1000 INJECTION, SOLUTION INTRAVENOUS
Refills: 0 | Status: DISCONTINUED | OUTPATIENT
Start: 2022-07-21 | End: 2022-07-23

## 2022-07-21 RX ORDER — INSULIN LISPRO 100/ML
VIAL (ML) SUBCUTANEOUS
Refills: 0 | Status: DISCONTINUED | OUTPATIENT
Start: 2022-07-21 | End: 2022-07-23

## 2022-07-21 RX ORDER — INSULIN HUMAN 100 [IU]/ML
5 INJECTION, SOLUTION SUBCUTANEOUS ONCE
Refills: 0 | Status: COMPLETED | OUTPATIENT
Start: 2022-07-21 | End: 2022-07-21

## 2022-07-21 RX ORDER — DEXTROSE 50 % IN WATER 50 %
12.5 SYRINGE (ML) INTRAVENOUS ONCE
Refills: 0 | Status: DISCONTINUED | OUTPATIENT
Start: 2022-07-21 | End: 2022-07-23

## 2022-07-21 RX ORDER — DEXTROSE 50 % IN WATER 50 %
25 SYRINGE (ML) INTRAVENOUS ONCE
Refills: 0 | Status: DISCONTINUED | OUTPATIENT
Start: 2022-07-21 | End: 2022-07-23

## 2022-07-21 RX ORDER — DEXTROSE 50 % IN WATER 50 %
15 SYRINGE (ML) INTRAVENOUS ONCE
Refills: 0 | Status: DISCONTINUED | OUTPATIENT
Start: 2022-07-21 | End: 2022-07-23

## 2022-07-21 RX ORDER — DEXTROSE 50 % IN WATER 50 %
50 SYRINGE (ML) INTRAVENOUS ONCE
Refills: 0 | Status: COMPLETED | OUTPATIENT
Start: 2022-07-21 | End: 2022-07-21

## 2022-07-21 RX ADMIN — INSULIN HUMAN 5 UNIT(S): 100 INJECTION, SOLUTION SUBCUTANEOUS at 18:52

## 2022-07-21 RX ADMIN — SODIUM ZIRCONIUM CYCLOSILICATE 15 GRAM(S): 10 POWDER, FOR SUSPENSION ORAL at 18:52

## 2022-07-21 RX ADMIN — Medication 50 MILLILITER(S): at 18:51

## 2022-07-21 NOTE — CONSULT NOTE ADULT - SUBJECTIVE AND OBJECTIVE BOX
Interventional Radiology    Evaluate for Procedure:  urgent shiley for hd . avf not working . missed last 3 hd sessions    HPI: 52M with hx of PMH of DM, ESRD on HD T/Th/Sa here for missed dialysis x 2 associated with generalized malaise. IR consulted for urgent shiley placement 7/21      Allergies:  nkda  Medications (Abx/Cardiac/Anticoagulation/Blood Products)      Data:  175.3  90.7  T(C): 36.7  HR: 75  BP: 117/68  RR: 18  SpO2: 99%    -WBC 5.57 / HgB 9.0 / Hct 26.7 / Plt 194  -Na 139 / Cl 95 / BUN 89 / Glucose 99  -K 4.9 / CO2 20 / Cr 9.92  -ALT -- / Alk Phos -- / T.Bili --  -INR 1.21 / PTT 33.1    Radiology:  reviewed     Assessment/Plan:    52M with hx of PMH of DM, ESRD on HD T/Th/Sa here for missed dialysis x 2 associated with generalized malaise. IR consulted for urgent shiley placement 7/21    - regarding acuity of urgent shiley placement, IR recommendation for urgent placement w/vascular at bedside  - d/w above w/ ED Dr. Cardenas   - IR will sign off, reconsult prn  - case d/w IR Attending Dr. Medina

## 2022-07-21 NOTE — H&P ADULT - PROBLEM SELECTOR PLAN 4
missed   see above  plan for access tomorrow HbA1C  recent HbA1C 6.2  finger sticks with short acting insulin sliding scale  no oral meds

## 2022-07-21 NOTE — H&P ADULT - NSHPPHYSICALEXAM_GEN_ALL_CORE
PHYSICAL EXAM: vital signs noted on Sunrise  in no apparent distress  HEENT: THOM EOMI  Neck: Supple, no JVD, no thyromegaly  Lungs: no wheeze, no crackles  CVS: S1 S2 ejection systolic murmur +   Abdomen: no tenderness, no organomegaly, BS present  Neuro: AO x 3 no focal weakness, no sensory abnormalities  Psych: appropriate affect  Skin: warm, dry  Ext: no cyanosis or clubbing, 1 + edema LE  right foot second digit partial amputation   left arm AVF with oval circumscribed papules with some excoriation, no foul smell or discharge   Msk: no joint swelling or deformities  Back: no CVA tenderness, no kyphosis/scoliosis

## 2022-07-21 NOTE — H&P ADULT - PROBLEM SELECTOR PLAN 6
s/p recent right foot digit amputation  no intervention at this time  outpatient follow up with vascular

## 2022-07-21 NOTE — ED PROVIDER NOTE - CLINICAL SUMMARY MEDICAL DECISION MAKING FREE TEXT BOX
Norma Fairbanks, Attending Physician: 52M here for missed dialysis (2 sessions contrary to triage note) relatively asymptomatic here fistula issue warranting admission for dialysis. Low clinical suspicion for need for emergent dialysis warranting emergent access. Nephro saw and evaluated patient requesting IR access however IR unable to perform tonight - labs will determine disposition and procedural need for level of care.

## 2022-07-21 NOTE — ED ADULT TRIAGE NOTE - WEIGHT IN LBS
[FreeTextEntry1] : Patient here for follow up for Botox injection, and interested in lower eyelid surgery
199.9

## 2022-07-21 NOTE — ED ADULT NURSE NOTE - OBJECTIVE STATEMENT
52 year old male presenting ambulatory to ED complaining of fistula not working. PMH of dialysis, ESRD, DM, and triple bypass surgery. Patient receives hemodialysis on Tuesday Thursday and Saturdays. Patient last hemodialysis was on Saturday 7/16 and has missed two treatments before presenting today. Patient states his fistula has been irritated for the past 1-2 months. Patient has been using topicals prescribed by dialysis clinic that have not been helpful. Patient also had a procedure last Wednesday to "widen" the vein around his fistula last Wednesday. Patient is complaining of generalized feelings of weakness and heaviness over the past few days since not having hemodialysis. Patient denies chest pain, SOB, palpitations, abdominal pain, N/V/D. Fistula on the left forearm appears red, swollen and inflamed. Stiches from last weeks procedure in place surrounding the vein near fistula. Heart rate and rhythm regular. Cardiac surgery scars noted on the chest. Lungs are clear and equal bilaterally. Abdomen is soft, nontender and nondistended. Peripheral pulses are strong and equal bilaterally. No edema noted in the lower extremities. IV placed and labs drawn. Dressing surrounding the fistula and procedure site changed. Comfort and safety measures maintained.

## 2022-07-21 NOTE — ED PROVIDER NOTE - NS ED ROS FT
Constitutional: No fever   Eyes: No visual changes  HEENT: No throat pain  CV: No chest pain  : No dysuria  MSK: +LE swelling  Skin: No rash  Neuro: No headache     Otherwise see HPI Patient

## 2022-07-21 NOTE — H&P ADULT - PROBLEM SELECTOR PLAN 2
mild  likely secondary to missed hemodialysis   no emergent need for ultrafiltration  discussed with renal  likely hemodialysis after Luis vs Farida tomorrow

## 2022-07-21 NOTE — ED PROVIDER NOTE - OBJECTIVE STATEMENT
Norma Fairbanks, Attending Physician: 51M with hx of PMH of DM, ESRD on HD T/Th/Sa here for missed dialysis x 2 associated with generalized malaise. No fevers, cough, SOB, abdominal pain or distention, nausea/vomiting, diarrhea.     PCP: Dr. Weiner

## 2022-07-21 NOTE — H&P ADULT - ASSESSMENT
This is a 53 yo M with hx of DM CAD, s/p CABG,  AICD on hemodialysis for approximately 5 years who has had AVF malfunction for the past few treatments admitted for management and evaluation

## 2022-07-21 NOTE — CONSULT NOTE ADULT - SUBJECTIVE AND OBJECTIVE BOX
[facility]  [phone]  NEPHROLOGY      INITIAL CONSULT NOTE  --------------------------------------------------------------------------------  HPI:    asked to consult on this pt by family request for ESRD. This is 53 yo M with hx of DM  cad  cabg AICD on hd for approx 5 years who has had avf malfunction for the past few treatments. he has had a rash over avf site. attempt by [facility] was made to angioplasty the proximal access site for use however, at the hd center has not been able to access avf for hd. pt last hd was 5 days PTA. renal consult called     PAST HISTORY  --------------------------------------------------------------------------------  PAST MEDICAL & SURGICAL HISTORY:  Diabetes mellitus  type 2      Foot ulcer due to secondary DM      Coronary artery disease      Acute on chronic systolic heart failure      Breast Reduction  at age 18      Toe amputation status, left      S/P CABG (coronary artery bypass graft)      AICD (automatic cardioverter/defibrillator) present        FAMILY HISTORY:  Family history of diabetes mellitus (Father)      PAST SOCIAL HISTORY:    ALLERGIES & MEDICATIONS  --------------------------------------------------------------------------------  Allergies    No Known Allergies    Intolerances      Standing Inpatient Medications    PRN Inpatient Medications      REVIEW OF SYSTEMS  --------------------------------------------------------------------------------  Gen: No  fevers/chills   Skin:  + rash   Head/Eyes/Ears/Mouth: No headache; Normal hearing;  No sinus pain/discomfort, sore throat  Respiratory: No dyspnea, cough, wheezing  CV: No chest pain, orthopnea  GI: No abdominal pain, diarrhea, nausea, vomiting, melena,   : No dysuria  hematuria, nocturia  MSK: No joint pain/swelling; no back pain  Neuro: No dizziness/lightheadedness  also with no edema       VITALS/PHYSICAL EXAM  --------------------------------------------------------------------------------  T(C): 36.3 (07-21-22 @ 16:45), Max: 36.7 (07-21-22 @ 13:56)  HR: 73 (07-21-22 @ 16:45) (73 - 75)  BP: 126/70 (07-21-22 @ 16:45) (117/68 - 126/70)  RR: 18 (07-21-22 @ 16:45) (18 - 18)  SpO2: 99% (07-21-22 @ 16:45) (99% - 99%)  Wt(kg): --  Height (cm): 175.3 (07-21-22 @ 13:56)  Weight (kg): 90.7 (07-21-22 @ 13:56)  BMI (kg/m2): 29.5 (07-21-22 @ 13:56)  BSA (m2): 2.07 (07-21-22 @ 13:56)      Physical Exam:  	Gen: Non toxic comfortable appearing   	no jvd  	Pulm: decrease bs  no rales or ronchi or wheezing  	CV: RRR, S1S2; no rub  	Back: No CVA tenderness  	Abd: +BS, soft, nontender/nondistended  	: No suprapubic tenderness  	UE: Warm, no cyanosis  no clubbing,  no edema; no asterixis  	LE: Warm, no edema  	Neuro: alert and oriented. speech coherent   	Psych: Normal affect and mood  	Skin: over avf site with excoriated skin more medial to avf and some over the avf site   	Vascular access: L avf with proximal site near elbow with infiltration as well     LABS/STUDIES  --------------------------------------------------------------------------------              10.6   7.28  >-----------<  174      [07-21-22 @ 17:23]              33.5                 Creatinine Trend:          HBsAg Nonreact      [01-16-22 @ 01:31]  HCV 0.25, Nonreact      [01-16-22 @ 01:31]

## 2022-07-21 NOTE — ED PROVIDER NOTE - PHYSICAL EXAMINATION
PE:  Gen: NAD  Head: NCAT  ENT: MMM, Normal conjunctiva  Chest: RRR, normal perfusion, 1+ edema bilaterally - pitting  Lungs: Symmetrical chest rise, lungs CTAB  Abdomen: soft, NTND, No rebound/guarding  Ext: No gross deformities, +palpable thrill of LUE fistula, 4 stitches on radial aspect of fistula site c/d/i  Neuro: awake and alert, Moving all extremities equally  Skin: no rashes

## 2022-07-21 NOTE — H&P ADULT - PROBLEM SELECTOR PLAN 5
s/p recent right foot digit amputation  no intervention at this time  outpatient follow up with vascular missed   see above  plan for access tomorrow

## 2022-07-21 NOTE — H&P ADULT - PROBLEM SELECTOR PLAN 1
detailed discussed with renal   IR note appreciated  will likely need permcath and definitive intervention at this time on left AVF  vascular Dr Malone to see detailed discussed with renal   IR note appreciated  will likely need permcath and definitive intervention at this time on left AVF  vascular Dr Malone to see  hyperkalemia 5.9  Lokelma 15 grams x 1   repeat in AM  no EKG abnormality

## 2022-07-21 NOTE — CONSULT NOTE ADULT - SUBJECTIVE AND OBJECTIVE BOX
Vascular Surgery Consult  Consulting surgical team: Vascular surgery  Consulting attending: Kira REEVES    HPI:  52M with hx of DM2 CAD, s/p CABG,  AICD on hemodialysis for approximately 5 years who has had AVF malfunction for the past few treatments. He states that he has had a rash over the AVF site. The last attempt by access was made to angioplasty the proximal access site for use however, at the hemodialysis center has not been able to access the AVF for hemodialysis. The last hemodialysis was 5 days prior and the patient is concerned that he is getting volume overloaded with some swelling in his legs. No chest pain or shortness of breath or abdominal swelling . No fever or chills     PAST MEDICAL HISTORY:  Diabetes mellitus    Foot ulcer due to secondary DM    Coronary artery disease    Acute on chronic systolic heart failure        PAST SURGICAL HISTORY:  Breast Reduction    Toe amputation status, left    S/P CABG (coronary artery bypass graft)    AICD (automatic cardioverter/defibrillator) present        MEDICATIONS:  dextrose 5%. 1000 milliLiter(s) IV Continuous <Continuous>  dextrose 5%. 1000 milliLiter(s) IV Continuous <Continuous>  dextrose 50% Injectable 25 Gram(s) IV Push once  dextrose 50% Injectable 12.5 Gram(s) IV Push once  dextrose 50% Injectable 25 Gram(s) IV Push once  dextrose Oral Gel 15 Gram(s) Oral once PRN  glucagon  Injectable 1 milliGRAM(s) IntraMuscular once  insulin lispro (ADMELOG) corrective regimen sliding scale   SubCutaneous three times a day before meals      ALLERGIES:  No Known Allergies      VITALS & I/Os:  Vital Signs Last 24 Hrs  T(C): 36.4 (21 Jul 2022 22:30), Max: 36.7 (21 Jul 2022 13:56)  T(F): 97.5 (21 Jul 2022 22:30), Max: 98 (21 Jul 2022 13:56)  HR: 72 (21 Jul 2022 22:30) (71 - 76)  BP: 147/73 (21 Jul 2022 22:30) (116/66 - 147/73)  BP(mean): --  RR: 18 (21 Jul 2022 22:30) (16 - 18)  SpO2: 97% (21 Jul 2022 22:30) (97% - 99%)    Parameters below as of 21 Jul 2022 22:30  Patient On (Oxygen Delivery Method): room air        I&O's Summary      PHYSICAL EXAM:  General: No acute distress  Respiratory: Nonlabored  Cardiovascular: RRR  Abdominal: Soft, nondistended, nontender.   Extremities: left arm AVF with round circumscribed raised lesion at previous access site; no thrill or bruit appreciated; palpable radial and ulnar pulses b/l UE    LABS:                        10.6   7.28  )-----------( 174      ( 21 Jul 2022 17:23 )             33.5     07-21    141  |  100  |  99<H>  ----------------------------<  105<H>  5.1   |  23  |  11.72<H>    Ca    8.3<L>      21 Jul 2022 20:09  Phos  7.9     07-21    TPro  7.4  /  Alb  4.3  /  TBili  0.3  /  DBili  x   /  AST  10  /  ALT  8<L>  /  AlkPhos  96  07-21    Lactate:  07-21 @ 16:40  1.3    PT/INR - ( 21 Jul 2022 17:23 )   PT: 12.2 sec;   INR: 1.05 ratio         PTT - ( 21 Jul 2022 17:23 )  PTT:30.8 sec              IMAGING:

## 2022-07-21 NOTE — H&P ADULT - HISTORY OF PRESENT ILLNESS
This is a 51 yo M with hx of DM CAD, s/p CABG,  AICD on hemodialysis for approximately 5 years who has had AVF malfunction for the past few treatments. He states that he has had a rash over the AVF site. The last attempt by access was made to angioplasty the proximal access site for use however, at the hemodialysis center has not been able to access the AVF for hemodialysis. The last hemodialysis was 5 days prior and the patient is concerned that he is getting volume overloaded with some swelling in his legs. No chest pain or shortness of breath or abdominal swelling . No fever or chills This is a 53 yo M with hx of DM2 CAD, s/p CABG,  AICD on hemodialysis for approximately 5 years who has had AVF malfunction for the past few treatments. He states that he has had a rash over the AVF site. The last attempt by access was made to angioplasty the proximal access site for use however, at the hemodialysis center has not been able to access the AVF for hemodialysis. The last hemodialysis was 5 days prior and the patient is concerned that he is getting volume overloaded with some swelling in his legs. No chest pain or shortness of breath or abdominal swelling . No fever or chills

## 2022-07-22 ENCOUNTER — NON-APPOINTMENT (OUTPATIENT)
Age: 52
End: 2022-07-22

## 2022-07-22 LAB
A1C WITH ESTIMATED AVERAGE GLUCOSE RESULT: 5.8 % — HIGH (ref 4–5.6)
ANION GAP SERPL CALC-SCNC: 18 MMOL/L — HIGH (ref 5–17)
BUN SERPL-MCNC: 100 MG/DL — HIGH (ref 7–23)
CALCIUM SERPL-MCNC: 8.3 MG/DL — LOW (ref 8.4–10.5)
CHLORIDE SERPL-SCNC: 100 MMOL/L — SIGNIFICANT CHANGE UP (ref 96–108)
CO2 SERPL-SCNC: 22 MMOL/L — SIGNIFICANT CHANGE UP (ref 22–31)
CREAT SERPL-MCNC: 11.59 MG/DL — HIGH (ref 0.5–1.3)
EGFR: 5 ML/MIN/1.73M2 — LOW
ESTIMATED AVERAGE GLUCOSE: 120 MG/DL — HIGH (ref 68–114)
GLUCOSE BLDC GLUCOMTR-MCNC: 109 MG/DL — HIGH (ref 70–99)
GLUCOSE BLDC GLUCOMTR-MCNC: 99 MG/DL — SIGNIFICANT CHANGE UP (ref 70–99)
GLUCOSE BLDC GLUCOMTR-MCNC: 99 MG/DL — SIGNIFICANT CHANGE UP (ref 70–99)
GLUCOSE SERPL-MCNC: 172 MG/DL — HIGH (ref 70–99)
HAV IGM SER-ACNC: SIGNIFICANT CHANGE UP
HBV CORE AB SER-ACNC: SIGNIFICANT CHANGE UP
HBV CORE IGM SER-ACNC: SIGNIFICANT CHANGE UP
HBV SURFACE AB SER-ACNC: 23.8 MIU/ML — SIGNIFICANT CHANGE UP
HBV SURFACE AB SER-ACNC: REACTIVE
HBV SURFACE AG SER-ACNC: SIGNIFICANT CHANGE UP
HBV SURFACE AG SER-ACNC: SIGNIFICANT CHANGE UP
HCT VFR BLD CALC: 30.3 % — LOW (ref 39–50)
HCV AB S/CO SERPL IA: 0.17 S/CO — SIGNIFICANT CHANGE UP (ref 0–0.99)
HCV AB S/CO SERPL IA: 0.18 S/CO — SIGNIFICANT CHANGE UP (ref 0–0.99)
HCV AB SERPL-IMP: SIGNIFICANT CHANGE UP
HCV AB SERPL-IMP: SIGNIFICANT CHANGE UP
HGB BLD-MCNC: 9.7 G/DL — LOW (ref 13–17)
INR BLD: 1.06 RATIO — SIGNIFICANT CHANGE UP (ref 0.88–1.16)
MCHC RBC-ENTMCNC: 31.1 PG — SIGNIFICANT CHANGE UP (ref 27–34)
MCHC RBC-ENTMCNC: 32 GM/DL — SIGNIFICANT CHANGE UP (ref 32–36)
MCV RBC AUTO: 97.1 FL — SIGNIFICANT CHANGE UP (ref 80–100)
NRBC # BLD: 0 /100 WBCS — SIGNIFICANT CHANGE UP (ref 0–0)
PLATELET # BLD AUTO: 153 K/UL — SIGNIFICANT CHANGE UP (ref 150–400)
POTASSIUM SERPL-MCNC: 5.1 MMOL/L — SIGNIFICANT CHANGE UP (ref 3.5–5.3)
POTASSIUM SERPL-SCNC: 5.1 MMOL/L — SIGNIFICANT CHANGE UP (ref 3.5–5.3)
PROTHROM AB SERPL-ACNC: 12.3 SEC — SIGNIFICANT CHANGE UP (ref 10.5–13.4)
RBC # BLD: 3.12 M/UL — LOW (ref 4.2–5.8)
RBC # FLD: 13.7 % — SIGNIFICANT CHANGE UP (ref 10.3–14.5)
SODIUM SERPL-SCNC: 140 MMOL/L — SIGNIFICANT CHANGE UP (ref 135–145)
TSH SERPL-MCNC: 5.16 UIU/ML — HIGH (ref 0.27–4.2)
WBC # BLD: 6.28 K/UL — SIGNIFICANT CHANGE UP (ref 3.8–10.5)
WBC # FLD AUTO: 6.28 K/UL — SIGNIFICANT CHANGE UP (ref 3.8–10.5)

## 2022-07-22 PROCEDURE — 77001 FLUOROGUIDE FOR VEIN DEVICE: CPT | Mod: 26

## 2022-07-22 PROCEDURE — 76937 US GUIDE VASCULAR ACCESS: CPT | Mod: 26

## 2022-07-22 PROCEDURE — 93990 DOPPLER FLOW TESTING: CPT | Mod: 26

## 2022-07-22 PROCEDURE — 99232 SBSQ HOSP IP/OBS MODERATE 35: CPT

## 2022-07-22 PROCEDURE — 99222 1ST HOSP IP/OBS MODERATE 55: CPT

## 2022-07-22 PROCEDURE — 36558 INSERT TUNNELED CV CATH: CPT

## 2022-07-22 RX ORDER — CHLORHEXIDINE GLUCONATE 213 G/1000ML
1 SOLUTION TOPICAL
Refills: 0 | Status: DISCONTINUED | OUTPATIENT
Start: 2022-07-22 | End: 2022-07-23

## 2022-07-22 RX ORDER — SODIUM CHLORIDE 9 MG/ML
1000 INJECTION, SOLUTION INTRAVENOUS
Refills: 0 | Status: DISCONTINUED | OUTPATIENT
Start: 2022-07-22 | End: 2022-07-23

## 2022-07-22 RX ORDER — CLOPIDOGREL BISULFATE 75 MG/1
75 TABLET, FILM COATED ORAL DAILY
Refills: 0 | Status: DISCONTINUED | OUTPATIENT
Start: 2022-07-22 | End: 2022-07-23

## 2022-07-22 RX ORDER — CARVEDILOL PHOSPHATE 80 MG/1
6.25 CAPSULE, EXTENDED RELEASE ORAL EVERY 12 HOURS
Refills: 0 | Status: DISCONTINUED | OUTPATIENT
Start: 2022-07-22 | End: 2022-07-23

## 2022-07-22 RX ORDER — LISINOPRIL 2.5 MG/1
2.5 TABLET ORAL DAILY
Refills: 0 | Status: DISCONTINUED | OUTPATIENT
Start: 2022-07-22 | End: 2022-07-23

## 2022-07-22 RX ORDER — ASCORBIC ACID 60 MG
500 TABLET,CHEWABLE ORAL DAILY
Refills: 0 | Status: DISCONTINUED | OUTPATIENT
Start: 2022-07-22 | End: 2022-07-23

## 2022-07-22 RX ORDER — ATORVASTATIN CALCIUM 80 MG/1
40 TABLET, FILM COATED ORAL AT BEDTIME
Refills: 0 | Status: DISCONTINUED | OUTPATIENT
Start: 2022-07-22 | End: 2022-07-23

## 2022-07-22 RX ORDER — SODIUM CHLORIDE 9 MG/ML
10 INJECTION INTRAMUSCULAR; INTRAVENOUS; SUBCUTANEOUS
Refills: 0 | Status: DISCONTINUED | OUTPATIENT
Start: 2022-07-22 | End: 2022-07-23

## 2022-07-22 RX ORDER — ASPIRIN/CALCIUM CARB/MAGNESIUM 324 MG
81 TABLET ORAL DAILY
Refills: 0 | Status: DISCONTINUED | OUTPATIENT
Start: 2022-07-23 | End: 2022-07-23

## 2022-07-22 RX ADMIN — ATORVASTATIN CALCIUM 40 MILLIGRAM(S): 80 TABLET, FILM COATED ORAL at 21:42

## 2022-07-22 RX ADMIN — CARVEDILOL PHOSPHATE 6.25 MILLIGRAM(S): 80 CAPSULE, EXTENDED RELEASE ORAL at 21:43

## 2022-07-22 RX ADMIN — SODIUM CHLORIDE 75 MILLILITER(S): 9 INJECTION, SOLUTION INTRAVENOUS at 20:39

## 2022-07-22 NOTE — CONSULT NOTE ADULT - CONSULT REQUESTED DATE/TIME
21-Jul-2022 17:58
21-Jul-2022 17:23
22-Jul-2022 09:00
22-Jul-2022 09:52
22-Jul-2022
21-Jul-2022 20:57
22-Jul-2022 13:44

## 2022-07-22 NOTE — CONSULT NOTE ADULT - ASSESSMENT
52 year old with DM, CAD, CABG, AICD has had difficultly with AVF for 5 days prior to admission  Denies fever chills, sweats , also had chronic foot ulcer.    His AVF site is slightly discolored and there are multiple bruises on the surrounding area from failed attempts at placing  catheters.    I don't think there is evidence of a fungal infection at present. 
52M with hx of DM2 CAD, s/p CABG,  AICD on hemodialysis for approximately 5 years who has had AVF malfunction for the past few treatments    Plan:   Admitted to medicine  agree with primary team, no urgent need for ultrafiltration  re-consult IR for shiley placement tomorrow  hemodialysis tomorrow after shiley placement     Plan discussed with fellow on behalf of attending  Vascular Surgery  [id number]
Assessment and Plan  Eczematous dermatitis, favor contact dermatitis, possibly 2/2 adhesives  At this time recommend:  -triamcinolone acetonide 0.1% BID. Avoid prolonged use (>2 weeks without breaks) to prevent unwanted side effects such as atrophy, striae and telangiectasias  -Recommend gentle soaps to cleanse the area (cetaphil, cerave, would avoid palmolive) and daily use of creams (cetaphil, cerave) or ointments (aquaphor, plain vaseline) for routine moisturization  -dermatology can follow if patient admitted over the weekend.    The patient's chart was reviewed in addition to being seen and examined at bedside with the dermatology attending Dr. Busch. Recommendations were communicated with the primary team.  Please page [phone] w/10 digit call back number for further related questions.    Lucretia Caba MD  Resident Physician, PGY2  [facility]  Pager: [phone]  Office: [phone]  
This is 53 yo M with hx of DM  cad  cabg AICD on hd for approx 5 years who has had avf malfunction for the past few treatments. he has had a rash over avf site. attempt by [facility] IR was made to angioplasty the proximal access site for use however, at the hd center has not been able to access avf for hd. pt last hd was 5 days PTA    1- ESRD  2- DM   3- avf site rash   4- cad    hd consent obtained witnessed and placed in chart   to have blood work done if k is high then vascular to place temp hd cath   if k in range and lytes in range then permcath placement by IR in am and keep pt npo after MN   ID to evaluate pt avf for his ? fungal rash    d/w ER team 
Cardiac Cath Lab - Adult (11.21.16 @ 09:47) >  INTERVENTIONAL RECOMMENDATIONS: CTA evaluation for CABG in light of  multivessel disease, ostial LAD  at atrifurcation of a large ramus and  diagonal, diabetes, severe cardiomyopathy, and CKD.  Prepared and signed by  Edinson Fontenot M.D.  TTE with Doppler (w/Cont) (01.14.22 @ 08:10) >  EF (Visual Estimate): 40 % Mild-moderate mitral regurgitation.  Moderate global left ventricular systolic dysfunction. Normal left ventricular internal dimensions and wall thicknesses. Mild diastolic dysfunction (Stage I).; grossly reduced right ventricular systolic function. Normal tricuspid valve.     a/p   51 yo M with hx of DM2 CAD, s/p CABG,  SYS CHF sp  AICD , ESRD on hemodialysis   presents with co of missed hemodialysis, malfunctioning AVF     #malfx AVF  -plan for  IR procedure order for Tunneled HD catheter placement- pt can proceed from a cv standpoint   -vascular fu     #chronic Systolic CHF sp AICD  -volume status appears stable  -fluid removal with HD   -resume BB. acei     #CAD sp CABG  -cv stable, resume asa post IR procedure   -resume statin, BB

## 2022-07-22 NOTE — PROCEDURE NOTE - PROCEDURE FINDINGS AND DETAILS
Successful Tunneled Hemodialysis Catheter Placement with placement of a 14.5 F 19 cm tunneled hemodialysis catheter via the right IJ.

## 2022-07-22 NOTE — CONSULT NOTE ADULT - SUBJECTIVE AND OBJECTIVE BOX
CARDIOLOGY CONSULT - Dr. Fontenot         HPI:  This is a 53 yo M with hx of DM2 CAD, s/p CABG,  AICD on hemodialysis for approximately 5 years who has had AVF malfunction for the past few treatments. He states that he has had a rash over the AVF site. The last attempt by access was made to angioplasty the proximal access site for use however, at the hemodialysis center has not been able to access the AVF for hemodialysis. The last hemodialysis was 5 days prior and the patient is concerned that he is getting volume overloaded with some swelling in his legs. No chest pain or shortness of breath or abdominal swelling . No fever or chills (21 Jul 2022 18:42)      PAST MEDICAL & SURGICAL HISTORY:  Diabetes mellitus  type 2      Foot ulcer due to secondary DM      Coronary artery disease      Acute on chronic systolic heart failure      Breast Reduction  at age 18      Toe amputation status, left      S/P CABG (coronary artery bypass graft)      AICD (automatic cardioverter/defibrillator) present              PREVIOUS DIAGNOSTIC TESTING:    [ ] Echocardiogram:  [ ]  Catheterization:  [ ] Stress Test:  	    MEDICATIONS:  Home Medications:  aspirin 81 mg oral delayed release tablet: 1 tab(s) orally once a day (21 Jul 2022 19:07)  atorvastatin 40 mg oral tablet: 1 tab(s) orally once a day (at bedtime) (21 Jul 2022 19:07)  betamethasone dipropionate 0.05% topical cream: Apply topically to affected area 2 times a day **Non-dialysis days** (21 Jul 2022 19:07)  carvedilol 6.25 mg oral tablet: 1 tab(s) orally every 12 hours (21 Jul 2022 19:07)  cilostazol 50 mg oral tablet: 1 tab(s) orally 2 times a day (21 Jul 2022 19:07)  clotrimazole 1% topical cream: Apply topically to affected area 2 times a day.**Non-dialysis days** (21 Jul 2022 19:07)  lisinopril 2.5 mg oral tablet: 1 tab(s) orally once a day (21 Jul 2022 19:07)  Nanette-Dharmesh oral tablet: 1 tab(s) orally once a day (21 Jul 2022 19:07)  Vitamin C 500 mg oral tablet: 1 tab(s) orally once a day (21 Jul 2022 19:07)      MEDICATIONS  (STANDING):  dextrose 5%. 1000 milliLiter(s) (50 mL/Hr) IV Continuous <Continuous>  dextrose 5%. 1000 milliLiter(s) (100 mL/Hr) IV Continuous <Continuous>  dextrose 50% Injectable 25 Gram(s) IV Push once  dextrose 50% Injectable 12.5 Gram(s) IV Push once  dextrose 50% Injectable 25 Gram(s) IV Push once  glucagon  Injectable 1 milliGRAM(s) IntraMuscular once  insulin lispro (ADMELOG) corrective regimen sliding scale   SubCutaneous three times a day before meals      FAMILY HISTORY:  Family history of diabetes mellitus (Father)        SOCIAL HISTORY:    [ ] Non-smoker  [ ] Smoker  [ ] Alcohol    Allergies    No Known Allergies    Intolerances    	    REVIEW OF SYSTEMS:  CONSTITUTIONAL: No fever, weight loss, or fatigue  EYES: No eye pain, visual disturbances, or discharge  ENMT:  No difficulty hearing, tinnitus, vertigo; No sinus or throat pain  NECK: No pain or stiffness  RESPIRATORY: No cough, wheezing, chills or hemoptysis; No Shortness of Breath  CARDIOVASCULAR: No chest pain, palpitations, passing out, dizziness, or leg swelling  GASTROINTESTINAL: No abdominal or epigastric pain. No nausea, vomiting, or hematemesis; No diarrhea or constipation. No melena or hematochezia.  GENITOURINARY: No dysuria, frequency, hematuria, or incontinence  NEUROLOGICAL: No headaches, memory loss, loss of strength, numbness, or tremors  SKIN: No itching, burning, rashes, or lesions   	    [ ] All others negative	  [ ] Unable to obtain    PHYSICAL EXAM:  T(C): 36.7 (07-22-22 @ 04:25), Max: 36.7 (07-21-22 @ 13:56)  HR: 73 (07-22-22 @ 04:25) (71 - 76)  BP: 134/73 (07-22-22 @ 04:25) (116/66 - 147/73)  RR: 18 (07-22-22 @ 04:25) (16 - 18)  SpO2: 97% (07-22-22 @ 04:25) (97% - 99%)  Wt(kg): --  I&O's Summary      Appearance: Normal	  Psychiatry: A & O x 3, Mood & affect appropriate  HEENT:   Normal oral mucosa, PERRL, EOMI	  Lymphatic: No lymphadenopathy  Cardiovascular: Normal S1 S2,RRR, No JVD, No murmurs  Respiratory: Lungs clear to auscultation	  Gastrointestinal:  Soft, Non-tender, + BS	  Skin: No rashes, No ecchymoses, No cyanosis	  Neurologic: Non-focal  Extremities: Normal range of motion, No clubbing, cyanosis or edema  Vascular: Peripheral pulses palpable 2+ bilaterally    TELEMETRY: 	    ECG:  	  RADIOLOGY:  OTHER: 	  	  LABS:	 	    CARDIAC MARKERS:                                  9.7    6.28  )-----------( 153      ( 22 Jul 2022 04:43 )             30.3     07-22    140  |  100  |  100<H>  ----------------------------<  172<H>  5.1   |  22  |  11.59<H>    Ca    8.3<L>      22 Jul 2022 04:43  Phos  7.9     07-21    TPro  7.4  /  Alb  4.3  /  TBili  0.3  /  DBili  x   /  AST  10  /  ALT  8<L>  /  AlkPhos  96  07-21    PT/INR - ( 22 Jul 2022 04:43 )   PT: 12.3 sec;   INR: 1.06 ratio         PTT - ( 21 Jul 2022 17:23 )  PTT:30.8 sec  proBNP:   Lipid Profile:   HgA1c:   TSH: Thyroid Stimulating Hormone, Serum: 5.16 uIU/mL (07-22 @ 04:43)         CARDIOLOGY CONSULT - Dr. Fontenot -- COVERAGE FOR DR FERNANDEZ         HPI:  This is a 53 yo M with hx of DM2 CAD, s/p CABG,  SYS CHF sp  AICD on hemodialysis for approximately 5 years who has had AVF malfunction for the past few treatments. He states that he has had a rash over the AVF site. The last attempt by access was made to angioplasty the proximal access site for use however, at the hemodialysis center has not been able to access the AVF for hemodialysis. The last hemodialysis was 5 days prior and the patient is concerned that he is getting volume overloaded with some swelling in his legs. No chest pain or shortness of breath or abdominal swelling . pt is known to Dr fernandez. he denies any cp sob. last echo from jan 2022 with EF (Visual Estimate): 40 % Mild-moderate mitral regurgitation.  Moderate global left ventricular systolic dysfunction. Normal left ventricular internal dimensions and wall thicknesses. Mild diastolic dysfunction (Stage I).; grossly reduced right ventricular systolic function. Normal tricuspid valve.     ROS otherwise negative       PAST MEDICAL & SURGICAL HISTORY:  Diabetes mellitus  type 2      Foot ulcer due to secondary DM      Coronary artery disease      Acute on chronic systolic heart failure      Breast Reduction  at age 18      Toe amputation status, left      S/P CABG (coronary artery bypass graft)      AICD (automatic cardioverter/defibrillator) present              PREVIOUS DIAGNOSTIC TESTING:    Cardiac Cath Lab - Adult (11.21.16 @ 09:47) >  INTERVENTIONAL RECOMMENDATIONS: CTA evaluation for CABG in light of  multivessel disease, ostial LAD  at atrifurcation of a large ramus and  diagonal, diabetes, severe cardiomyopathy, and CKD.  Prepared and signed by  Edinson Fontenot M.D.  TTE with Doppler (w/Cont) (01.14.22 @ 08:10) >  EF (Visual Estimate): 40 % Mild-moderate mitral regurgitation.  Moderate global left ventricular systolic dysfunction. Normal left ventricular internal dimensions and wall thicknesses. Mild diastolic dysfunction (Stage I).; grossly reduced right ventricular systolic function. Normal tricuspid valve.       MEDICATIONS:  Home Medications:  aspirin 81 mg oral delayed release tablet: 1 tab(s) orally once a day (21 Jul 2022 19:07)  atorvastatin 40 mg oral tablet: 1 tab(s) orally once a day (at bedtime) (21 Jul 2022 19:07)  betamethasone dipropionate 0.05% topical cream: Apply topically to affected area 2 times a day **Non-dialysis days** (21 Jul 2022 19:07)  carvedilol 6.25 mg oral tablet: 1 tab(s) orally every 12 hours (21 Jul 2022 19:07)  cilostazol 50 mg oral tablet: 1 tab(s) orally 2 times a day (21 Jul 2022 19:07)  clotrimazole 1% topical cream: Apply topically to affected area 2 times a day.**Non-dialysis days** (21 Jul 2022 19:07)  lisinopril 2.5 mg oral tablet: 1 tab(s) orally once a day (21 Jul 2022 19:07)  Nanette-Dharmesh oral tablet: 1 tab(s) orally once a day (21 Jul 2022 19:07)  Vitamin C 500 mg oral tablet: 1 tab(s) orally once a day (21 Jul 2022 19:07)      MEDICATIONS  (STANDING):  dextrose 5%. 1000 milliLiter(s) (50 mL/Hr) IV Continuous <Continuous>  dextrose 5%. 1000 milliLiter(s) (100 mL/Hr) IV Continuous <Continuous>  dextrose 50% Injectable 25 Gram(s) IV Push once  dextrose 50% Injectable 12.5 Gram(s) IV Push once  dextrose 50% Injectable 25 Gram(s) IV Push once  glucagon  Injectable 1 milliGRAM(s) IntraMuscular once  insulin lispro (ADMELOG) corrective regimen sliding scale   SubCutaneous three times a day before meals      FAMILY HISTORY:  Family history of diabetes mellitus (Father)        SOCIAL HISTORY:    [x ] Non-smoker  [ ] Smoker  [ ] Alcohol    Allergies    No Known Allergies    Intolerances    	    REVIEW OF SYSTEMS:  CONSTITUTIONAL: No fever, weight loss, or fatigue  EYES: No eye pain, visual disturbances, or discharge  ENMT:  No difficulty hearing, tinnitus, vertigo; No sinus or throat pain  NECK: No pain or stiffness  RESPIRATORY: No cough, wheezing, chills or hemoptysis; No Shortness of Breath  CARDIOVASCULAR: No chest pain, palpitations, passing out, dizziness, or leg swelling  GASTROINTESTINAL: No abdominal or epigastric pain. No nausea, vomiting, or hematemesis; No diarrhea or constipation. No melena or hematochezia.  GENITOURINARY: No dysuria, frequency, hematuria, or incontinence  NEUROLOGICAL: No headaches, memory loss, loss of strength, numbness, or tremors  SKIN: No itching, burning, rashes, or lesions   	    [x ] All others negative	  [ ] Unable to obtain    PHYSICAL EXAM:  T(C): 36.7 (07-22-22 @ 04:25), Max: 36.7 (07-21-22 @ 13:56)  HR: 73 (07-22-22 @ 04:25) (71 - 76)  BP: 134/73 (07-22-22 @ 04:25) (116/66 - 147/73)  RR: 18 (07-22-22 @ 04:25) (16 - 18)  SpO2: 97% (07-22-22 @ 04:25) (97% - 99%)  Wt(kg): --  I&O's Summary      Appearance: Normal	  Psychiatry: A & O x 3, Mood & affect appropriate  HEENT:   Normal oral mucosa, PERRL, EOMI	  Lymphatic: No lymphadenopathy  Cardiovascular: Normal S1 S2,RRR, No JVD, No murmurs  Respiratory: Lungs clear to auscultation	  Gastrointestinal:  Soft, Non-tender, + BS	  Skin: No rashes, No ecchymoses, No cyanosis	  Neurologic: Non-focal  Extremities: Normal range of motion, No clubbing, cyanosis or edema  Vascular: Peripheral pulses palpable 2+ bilaterally    TELEMETRY: 	    ECG:  	nsr with old anterolateral infarct   RADIOLOGY:  < from: CT Chest No Cont (07.15.22 @ 13:08) >  FINDINGS:    Evaluation of the abdominal and pelvic viscera is limited without   intravenous contrast.    CHEST:  LUNGS AND LARGE AIRWAYS: Patent central airways. Left subpleural mass   within the left lower lobe measuring approximately 5.9 x 1.9 and by 4.9   cm with internal calcifications and consistent with round atelectasis.   Additional areas of linear atelectasis and/or scarring in the left lung.  PLEURA: Small loculated left pleural effusion. Parietal and visceral   pleural calcifications noted.  VESSELS: Aortic calcifications.  HEART: Heart size is normal. ICD leads in place. Coronary artery   calcifications. No pericardial effusion.  MEDIASTINUM AND AG: No lymphadenopathy.  CHEST WALL AND LOWER NECK: Bilateral gynecomastia. ICD device in place   within the anterior left subcutaneous tissues.    ABDOMEN AND PELVIS:  LIVER: Within normal limits.  BILE DUCTS: Normal caliber.  GALLBLADDER: Within normal limits.  SPLEEN: Enlarged, measuring 14.0 cm in craniocaudal diameter.  PANCREAS: Within normal limits.  ADRENALS: Within normal limits.  KIDNEYS/URETERS: Bilateral atrophic kidneys. No hydronephrosis. Bilateral   renal hypodensities, likely cysts.    BLADDER: Within normal limits.  REPRODUCTIVE ORGANS: Prostate within normal limits.    BOWEL: Colonic diverticulosis without diverticulitis. No bowel   obstruction. Appendix is normal.  PERITONEUM: No ascites.  VESSELS: Extensive atherosclerotic calcifications with calcification of   the aorta, celiac axis, SMA, and bilateral renal arteries. Moderate   atherosclerotic calcification in the bilateral common iliac artery. Dense   calcifications in the bilateral internal iliac arteries. Minimal   calcifications in the bilateral external iliac arteries  RETROPERITONEUM/LYMPH NODES: No lymphadenopathy.  ABDOMINAL WALL: Fat-containing umbilical hernia.  BONES: Degenerative changes of the spine.    IMPRESSION:    CHEST:  Left lower lobe round atelectasis.  Small loculated left pleural effusion with left pleural calcifications.    ABDOMEN and PELVIS:  Bilateral renal atrophy and low-attenuation renal lesions, likely cysts.  Extensive atherosclerotic changes, as described.    --- End of Report ---      < end of copied text >    OTHER: 	  	  LABS:	 	    CARDIAC MARKERS:                                  9.7    6.28  )-----------( 153      ( 22 Jul 2022 04:43 )             30.3     07-22    140  |  100  |  100<H>  ----------------------------<  172<H>  5.1   |  22  |  11.59<H>    Ca    8.3<L>      22 Jul 2022 04:43  Phos  7.9     07-21    TPro  7.4  /  Alb  4.3  /  TBili  0.3  /  DBili  x   /  AST  10  /  ALT  8<L>  /  AlkPhos  96  07-21    PT/INR - ( 22 Jul 2022 04:43 )   PT: 12.3 sec;   INR: 1.06 ratio         PTT - ( 21 Jul 2022 17:23 )  PTT:30.8 sec  proBNP:   Lipid Profile:   HgA1c:   TSH: Thyroid Stimulating Hormone, Serum: 5.16 uIU/mL (07-22 @ 04:43)

## 2022-07-22 NOTE — CONSULT NOTE ADULT - SUBJECTIVE AND OBJECTIVE BOX
Patient is a 52y old  Male who presents with a chief complaint of missed hemodialysis, malfunctioning AVF (21 Jul 2022 20:53)    HPI:  This is a 53 yo M with hx of DM2 CAD, s/p CABG,  AICD on hemodialysis for approximately 5 years who has had AVF malfunction for the past few treatments. He states that he has had a rash over the AVF site. The last attempt by access was made to angioplasty the proximal access site for use however, at the hemodialysis center has not been able to access the AVF for hemodialysis. The last hemodialysis was 5 days prior and the patient is concerned that he is getting volume overloaded with some swelling in his legs. No chest pain or shortness of breath or abdominal swelling . No fever or chills (21 Jul 2022 18:42)      PAST MEDICAL & SURGICAL HISTORY:  Diabetes mellitus  type 2      Foot ulcer due to secondary DM      Coronary artery disease      Acute on chronic systolic heart failure      Breast Reduction  at age 18      Toe amputation status, left      S/P CABG (coronary artery bypass graft)      AICD (automatic cardioverter/defibrillator) present          Social history:    FAMILY HISTORY:  Family history of diabetes mellitus (Father)                Allergic/Immunologic:	No hives or rash   Allergies    No Known Allergies    Intolerances        Antimicrobials:          Vital Signs Last 24 Hrs  T(C): 36.7 (22 Jul 2022 04:25), Max: 36.7 (21 Jul 2022 13:56)  T(F): 98.1 (22 Jul 2022 04:25), Max: 98.1 (22 Jul 2022 04:25)  HR: 73 (22 Jul 2022 04:25) (71 - 76)  BP: 134/73 (22 Jul 2022 04:25) (116/66 - 147/73)  BP(mean): --  RR: 18 (22 Jul 2022 04:25) (16 - 18)  SpO2: 97% (22 Jul 2022 04:25) (97% - 99%)    Parameters below as of 22 Jul 2022 04:25  Patient On (Oxygen Delivery Method): room air                No cachexia     Eyes:PERRL EOMI.NO discharge or conjunctival injection    ENMT:No sinus tenderness.No thrush.No pharyngeal exudate or erythema.Fair dental hygiene    Neck:Supple,No LN,no JVD      Respiratory:Good air entry bilaterally,CTA    Cardiovascular:S1 S2 wnl, No murmurs,rub or gallops    Gastrointestinal:Soft BS(+) no tenderness no masses ,No rebound or guarding    Genitourinary:No CVA tendereness     Rectal:    Extremities: avd site eccymotic  no open wd                                    9.7    6.28  )-----------( 153      ( 22 Jul 2022 04:43 )             30.3         07-22    140  |  100  |  100<H>  ----------------------------<  172<H>  5.1   |  22  |  11.59<H>    Ca    8.3<L>      22 Jul 2022 04:43  Phos  7.9     07-21    TPro  7.4  /  Alb  4.3  /  TBili  0.3  /  DBili  x   /  AST  10  /  ALT  8<L>  /  AlkPhos  96  07-21      RECENT CULTURES:      MICROBIOLOGY:          Radiology:      Assessment:        Recommendations and Plan:    Pager [phone]  After 5 pm/weekends or if no response :[phone]

## 2022-07-22 NOTE — PRE PROCEDURE NOTE - PRE PROCEDURE EVALUATION
Procedure: Tunneled Hemodialysis Catheter Placement    Indication: ESRD      Clinical History: 52M with ESRD requiring long term dialysis. Now planned for Tunneled Hemodialysis Catheter Placement.      Meds:ascorbic acid 500 milliGRAM(s) Oral daily  atorvastatin 40 milliGRAM(s) Oral at bedtime  carvedilol 6.25 milliGRAM(s) Oral every 12 hours  clopidogrel Tablet 75 milliGRAM(s) Oral daily  dextrose 5%. 1000 milliLiter(s) IV Continuous <Continuous>  dextrose 5%. 1000 milliLiter(s) IV Continuous <Continuous>  dextrose 50% Injectable 25 Gram(s) IV Push once  dextrose 50% Injectable 12.5 Gram(s) IV Push once  dextrose 50% Injectable 25 Gram(s) IV Push once  dextrose Oral Gel 15 Gram(s) Oral once PRN  glucagon  Injectable 1 milliGRAM(s) IntraMuscular once  insulin lispro (ADMELOG) corrective regimen sliding scale   SubCutaneous three times a day before meals  lisinopril 2.5 milliGRAM(s) Oral daily      Allergies:No Known Allergies        Labs:                           9.7    6.28  )-----------( 153      ( 22 Jul 2022 04:43 )             30.3     PT/INR - ( 22 Jul 2022 04:43 )   PT: 12.3 sec;   INR: 1.06 ratio         PTT - ( 21 Jul 2022 17:23 )  PTT:30.8 sec  07-22    140  |  100  |  100<H>  ----------------------------<  172<H>  5.1   |  22  |  11.59<H>    Ca    8.3<L>      22 Jul 2022 04:43  Phos  7.9     07-21    TPro  7.4  /  Alb  4.3  /  TBili  0.3  /  DBili  x   /  AST  10  /  ALT  8<L>  /  AlkPhos  96  07-21        Physical Exam: NAD      Anesthesia: Sedation by Anesthesiologist

## 2022-07-22 NOTE — CONSULT NOTE ADULT - SUBJECTIVE AND OBJECTIVE BOX
Interventional Radiology    Evaluate for Procedure: Tunneled HD catheter    HPI: 52 year old male with a PMH of DM2 CAD, s/p CABG,  AICD on hemodialysis for approximately 5 years who has had AVF malfunction for the past few treatments. He states that he has had a rash over the AVF site. The last attempt by access was made to angioplasty the proximal access site for use however, at the hemodialysis center has not been able to access the AVF for hemodialysis. The last hemodialysis was 5 days prior and the patient is concerned that he is getting volume overloaded with some swelling in his legs.    Allergies:   Medications (Abx/Cardiac/Anticoagulation/Blood Products)      Data:  175.3  90.7  T(C): 36.7  HR: 73  BP: 134/73  RR: 18  SpO2: 97%    -WBC 6.28 / HgB 9.7 / Hct 30.3 / Plt 153  -Na 140 / Cl 100 /  / Glucose 172  -K 5.1 / CO2 22 / Cr 11.59  -ALT -- / Alk Phos -- / T.Bili --  -INR 1.06 / PTT --      Assessment/Plan: 52 year old male with a PMH of DM2 CAD, s/p CABG, AICD on hemodialysis for approximately 5 years who has had AVF malfunction for the past few treatments. IR consulted for tunneled HD Catheter placement.    - Place IR procedure order for Tunneled HD catheter placement. Will add patient on schedule for today  - Keep patient NPO for procedure, patient will need anesthesia  - Hold anticoagulation for procedure

## 2022-07-22 NOTE — PATIENT PROFILE ADULT - FALL HARM RISK - HARM RISK INTERVENTIONS

## 2022-07-22 NOTE — CONSULT NOTE ADULT - PROVIDER SPECIALTY LIST ADULT
Vascular Surgery
Cardiology
Nephrology
Intervent Radiology
Intervent Radiology
Dermatology
Infectious Disease

## 2022-07-22 NOTE — CONSULT NOTE ADULT - CONSULT REASON
avf
ESRD
ganesh for urgent HD
rash on arm over AVF
cards eval
Malfunction of AVF
Tunneled HD catheter

## 2022-07-22 NOTE — PROGRESS NOTE ADULT - NS ATTEND AMEND GEN_ALL_CORE FT
Seen, examined with, formulated plan with and  agree with above as scribed by NP Finesse [William]     hyperkalemia overnight treated   low k diet   arranged with IR to place permcath   will need to rest and re evaluate avf use in near future   for now to have permcath and hd  today as well as am   d/w primary team

## 2022-07-22 NOTE — CONSULT NOTE ADULT - REASON FOR ADMISSION
missed hemodialysis, malfunctioning AVF

## 2022-07-22 NOTE — CONSULT NOTE ADULT - TIME BILLING
Patient seen and examined.  Agree with above NP note.  a/p   53 yo M with hx of DM2 CAD, s/p CABG,  SYS CHF sp  AICD , ESRD on hemodialysis   presents with co of missed hemodialysis, malfunctioning AVF     #malfxn of AVF  -await IR procedure order for Tunneled HD catheter placement  -pt can proceed from a cv standpoint   -vascular fu     #chronic Systolic CHF sp AICD  -volume status appears stable  -fluid removal with HD   -BB/acei     #CAD sp CABG  -cv stable, resume asa post IR procedure   -resume statin, BB

## 2022-07-22 NOTE — CONSULT NOTE ADULT - SUBJECTIVE AND OBJECTIVE BOX
HPI:  52-year-old male with a history of DM2, CAD s/p CABG and AICD, on hemodialysis for approximately five years with AVF malfunction over the last few months. Dermatology consulted for a rash over his AVF site for the last 2 to 3 weeks. It is very itchy. He denies having a prosthetic graft for his AVF. He was given a topical steroid cream by his access center with some improvement and that he thinks it gets worse with the different tapes and dressings that have been applied to the area. Washes area with palmolive soap He denies rash elsewhere. He said that in the past when he got contrast he had a rash all over his body, however this has improved. Denies fevers, chills, pain, abdominal sx    PAST MEDICAL & SURGICAL HISTORY:  Diabetes mellitus  type 2  Foot ulcer due to secondary DM  Coronary artery disease  Acute on chronic systolic heart failure  Breast Reduction  at age 18  Toe amputation status, left  S/P CABG (coronary artery bypass graft)  AICD (automatic cardioverter/defibrillator) present    REVIEW OF SYSTEMS  per HPI    MEDICATIONS  (STANDING):  ascorbic acid 500 milliGRAM(s) Oral daily  atorvastatin 40 milliGRAM(s) Oral at bedtime  carvedilol 6.25 milliGRAM(s) Oral every 12 hours  clopidogrel Tablet 75 milliGRAM(s) Oral daily  dextrose 5%. 1000 milliLiter(s) (100 mL/Hr) IV Continuous <Continuous>  dextrose 5%. 1000 milliLiter(s) (50 mL/Hr) IV Continuous <Continuous>  dextrose 50% Injectable 25 Gram(s) IV Push once  dextrose 50% Injectable 12.5 Gram(s) IV Push once  dextrose 50% Injectable 25 Gram(s) IV Push once  glucagon  Injectable 1 milliGRAM(s) IntraMuscular once  insulin lispro (ADMELOG) corrective regimen sliding scale   SubCutaneous three times a day before meals  lisinopril 2.5 milliGRAM(s) Oral daily    MEDICATIONS  (PRN):  dextrose Oral Gel 15 Gram(s) Oral once PRN Blood Glucose LESS THAN 70 milliGRAM(s)/deciliter      Allergies  No Known Allergies  Intolerances    SOCIAL HISTORY:  non smoker  denies IVDA or etoh abuse  lives with family    FAMILY HISTORY:  Family history of diabetes mellitus (Father)    Vital Signs Last 24 Hrs  T(C): 36.6 (22 Jul 2022 12:20), Max: 36.7 (22 Jul 2022 04:25)  T(F): 97.9 (22 Jul 2022 12:20), Max: 98.1 (22 Jul 2022 04:25)  HR: 76 (22 Jul 2022 12:20) (71 - 76)  BP: 137/79 (22 Jul 2022 12:20) (116/66 - 147/73)  BP(mean): --  RR: 17 (22 Jul 2022 12:20) (16 - 18)  SpO2: 97% (22 Jul 2022 12:20) (97% - 99%)    Parameters below as of 22 Jul 2022 12:20  Patient On (Oxygen Delivery Method): room air    PHYSICAL EXAM:  The patient was alert and oriented X 3, well nourished, and in no apparent distress. Oropharynx showed no ulcerations. There was no visible lymphadenopathy. Conjunctiva were non-injected. There was no clubbing or edema of extremities. The scalp, hair, face, eyebrows, lips, oropharynx , neck, chest, back, buttocks, extremities X 4, hands, feet, nails were examined. There was no hyperhidrosis or bromhidrosis.   The following lesions are noted:  ill-defined thin pink scaly plaques on the L forearm with pin pinpoint excoriations  scattered ecchymoses    LABS:                        9.7    6.28  )-----------( 153      ( 22 Jul 2022 04:43 )             30.3     07-22    140  |  100  |  100<H>  ----------------------------<  172<H>  5.1   |  22  |  11.59<H>    Ca    8.3<L>      22 Jul 2022 04:43  Phos  7.9     07-21    TPro  7.4  /  Alb  4.3  /  TBili  0.3  /  DBili  x   /  AST  10  /  ALT  8<L>  /  AlkPhos  96  07-21    PT/INR - ( 22 Jul 2022 04:43 )   PT: 12.3 sec;   INR: 1.06 ratio         PTT - ( 21 Jul 2022 17:23 )  PTT:30.8 sec    IMAGING:  Duplex US on radiocephalic AVF showed thrombosed branch of cephalic outflow vein. HPI:  52-year-old male with a history of DM2, CAD s/p CABG and AICD, on hemodialysis for approximately five years with AVF malfunction over the last few months. Dermatology consulted for a rash over his AVF site for the last 2 to 3 weeks. It is very itchy. He denies having a prosthetic graft for his AVF. He was given a topical steroid cream by his access center with some improvement and that he thinks it gets worse with the different tapes and dressings that have been applied to the area. Washes area with palmolive soap He denies rash elsewhere. He said that in the past when he got contrast he had a rash all over his body, however this has improved. Denies fevers, chills, pain, abdominal sx    PAST MEDICAL & SURGICAL HISTORY:  Diabetes mellitus  type 2  Foot ulcer due to secondary DM  Coronary artery disease  Acute on chronic systolic heart failure  Breast Reduction  at age 18  Toe amputation status, left  S/P CABG (coronary artery bypass graft)  AICD (automatic cardioverter/defibrillator) present    REVIEW OF SYSTEMS  per HPI    MEDICATIONS  (STANDING):  ascorbic acid 500 milliGRAM(s) Oral daily  atorvastatin 40 milliGRAM(s) Oral at bedtime  carvedilol 6.25 milliGRAM(s) Oral every 12 hours  clopidogrel Tablet 75 milliGRAM(s) Oral daily  dextrose 5%. 1000 milliLiter(s) (100 mL/Hr) IV Continuous <Continuous>  dextrose 5%. 1000 milliLiter(s) (50 mL/Hr) IV Continuous <Continuous>  dextrose 50% Injectable 25 Gram(s) IV Push once  dextrose 50% Injectable 12.5 Gram(s) IV Push once  dextrose 50% Injectable 25 Gram(s) IV Push once  glucagon  Injectable 1 milliGRAM(s) IntraMuscular once  insulin lispro (ADMELOG) corrective regimen sliding scale   SubCutaneous three times a day before meals  lisinopril 2.5 milliGRAM(s) Oral daily    MEDICATIONS  (PRN):  dextrose Oral Gel 15 Gram(s) Oral once PRN Blood Glucose LESS THAN 70 milliGRAM(s)/deciliter      Allergies  No Known Allergies  Intolerances    SOCIAL HISTORY:  non smoker  denies IVDA or etoh abuse  lives with family    FAMILY HISTORY:  Family history of diabetes mellitus (Father)    Vital Signs Last 24 Hrs  T(C): 36.6 (22 Jul 2022 12:20), Max: 36.7 (22 Jul 2022 04:25)  T(F): 97.9 (22 Jul 2022 12:20), Max: 98.1 (22 Jul 2022 04:25)  HR: 76 (22 Jul 2022 12:20) (71 - 76)  BP: 137/79 (22 Jul 2022 12:20) (116/66 - 147/73)  BP(mean): --  RR: 17 (22 Jul 2022 12:20) (16 - 18)  SpO2: 97% (22 Jul 2022 12:20) (97% - 99%)    Parameters below as of 22 Jul 2022 12:20  Patient On (Oxygen Delivery Method): room air    PHYSICAL EXAM:  The patient was alert and oriented X 3, well nourished, and in no apparent distress. Oropharynx showed no ulcerations. There was no visible lymphadenopathy. Conjunctiva were non-injected. There was no clubbing or edema of extremities. The scalp, hair, face, eyebrows, lips, oropharynx , neck, chest, back,  extremities X 4, hands, feet, nails were examined. There was no hyperhidrosis or bromhidrosis.   The following lesions are noted:  ill-defined thin pink scaly plaques on the L forearm with pin pinpoint excoriations  scattered ecchymoses    LABS:                        9.7    6.28  )-----------( 153      ( 22 Jul 2022 04:43 )             30.3     07-22    140  |  100  |  100<H>  ----------------------------<  172<H>  5.1   |  22  |  11.59<H>    Ca    8.3<L>      22 Jul 2022 04:43  Phos  7.9     07-21    TPro  7.4  /  Alb  4.3  /  TBili  0.3  /  DBili  x   /  AST  10  /  ALT  8<L>  /  AlkPhos  96  07-21    PT/INR - ( 22 Jul 2022 04:43 )   PT: 12.3 sec;   INR: 1.06 ratio         PTT - ( 21 Jul 2022 17:23 )  PTT:30.8 sec    IMAGING:  Duplex US on radiocephalic AVF showed thrombosed branch of cephalic outflow vein.

## 2022-07-23 ENCOUNTER — TRANSCRIPTION ENCOUNTER (OUTPATIENT)
Age: 52
End: 2022-07-23

## 2022-07-23 VITALS
HEART RATE: 78 BPM | RESPIRATION RATE: 18 BRPM | TEMPERATURE: 98 F | OXYGEN SATURATION: 99 % | SYSTOLIC BLOOD PRESSURE: 137 MMHG | DIASTOLIC BLOOD PRESSURE: 84 MMHG

## 2022-07-23 LAB
ANION GAP SERPL CALC-SCNC: 15 MMOL/L — SIGNIFICANT CHANGE UP (ref 5–17)
BUN SERPL-MCNC: 38 MG/DL — HIGH (ref 7–23)
CALCIUM SERPL-MCNC: 8.9 MG/DL — SIGNIFICANT CHANGE UP (ref 8.4–10.5)
CHLORIDE SERPL-SCNC: 96 MMOL/L — SIGNIFICANT CHANGE UP (ref 96–108)
CO2 SERPL-SCNC: 25 MMOL/L — SIGNIFICANT CHANGE UP (ref 22–31)
CREAT SERPL-MCNC: 6.21 MG/DL — HIGH (ref 0.5–1.3)
EGFR: 10 ML/MIN/1.73M2 — LOW
GLUCOSE BLDC GLUCOMTR-MCNC: 117 MG/DL — HIGH (ref 70–99)
GLUCOSE BLDC GLUCOMTR-MCNC: 165 MG/DL — HIGH (ref 70–99)
GLUCOSE SERPL-MCNC: 101 MG/DL — HIGH (ref 70–99)
MAGNESIUM SERPL-MCNC: 2.3 MG/DL — SIGNIFICANT CHANGE UP (ref 1.6–2.6)
POTASSIUM SERPL-MCNC: 4 MMOL/L — SIGNIFICANT CHANGE UP (ref 3.5–5.3)
POTASSIUM SERPL-SCNC: 4 MMOL/L — SIGNIFICANT CHANGE UP (ref 3.5–5.3)
SODIUM SERPL-SCNC: 136 MMOL/L — SIGNIFICANT CHANGE UP (ref 135–145)

## 2022-07-23 PROCEDURE — 82435 ASSAY OF BLOOD CHLORIDE: CPT

## 2022-07-23 PROCEDURE — 82962 GLUCOSE BLOOD TEST: CPT

## 2022-07-23 PROCEDURE — 84443 ASSAY THYROID STIM HORMONE: CPT

## 2022-07-23 PROCEDURE — 36415 COLL VENOUS BLD VENIPUNCTURE: CPT

## 2022-07-23 PROCEDURE — 85610 PROTHROMBIN TIME: CPT

## 2022-07-23 PROCEDURE — 80074 ACUTE HEPATITIS PANEL: CPT

## 2022-07-23 PROCEDURE — 83735 ASSAY OF MAGNESIUM: CPT

## 2022-07-23 PROCEDURE — 83605 ASSAY OF LACTIC ACID: CPT

## 2022-07-23 PROCEDURE — 99285 EMERGENCY DEPT VISIT HI MDM: CPT | Mod: 25

## 2022-07-23 PROCEDURE — 87340 HEPATITIS B SURFACE AG IA: CPT

## 2022-07-23 PROCEDURE — 86803 HEPATITIS C AB TEST: CPT

## 2022-07-23 PROCEDURE — 86706 HEP B SURFACE ANTIBODY: CPT

## 2022-07-23 PROCEDURE — 84100 ASSAY OF PHOSPHORUS: CPT

## 2022-07-23 PROCEDURE — 84295 ASSAY OF SERUM SODIUM: CPT

## 2022-07-23 PROCEDURE — C1750: CPT

## 2022-07-23 PROCEDURE — 80048 BASIC METABOLIC PNL TOTAL CA: CPT

## 2022-07-23 PROCEDURE — 83036 HEMOGLOBIN GLYCOSYLATED A1C: CPT

## 2022-07-23 PROCEDURE — 87637 SARSCOV2&INF A&B&RSV AMP PRB: CPT

## 2022-07-23 PROCEDURE — 85018 HEMOGLOBIN: CPT

## 2022-07-23 PROCEDURE — 82947 ASSAY GLUCOSE BLOOD QUANT: CPT

## 2022-07-23 PROCEDURE — C1894: CPT

## 2022-07-23 PROCEDURE — 77001 FLUOROGUIDE FOR VEIN DEVICE: CPT

## 2022-07-23 PROCEDURE — 99232 SBSQ HOSP IP/OBS MODERATE 35: CPT

## 2022-07-23 PROCEDURE — 85014 HEMATOCRIT: CPT

## 2022-07-23 PROCEDURE — 82330 ASSAY OF CALCIUM: CPT

## 2022-07-23 PROCEDURE — 86901 BLOOD TYPING SEROLOGIC RH(D): CPT

## 2022-07-23 PROCEDURE — 99261: CPT

## 2022-07-23 PROCEDURE — 80053 COMPREHEN METABOLIC PANEL: CPT

## 2022-07-23 PROCEDURE — 82803 BLOOD GASES ANY COMBINATION: CPT

## 2022-07-23 PROCEDURE — C1769: CPT

## 2022-07-23 PROCEDURE — 85027 COMPLETE CBC AUTOMATED: CPT

## 2022-07-23 PROCEDURE — 86704 HEP B CORE ANTIBODY TOTAL: CPT

## 2022-07-23 PROCEDURE — 85025 COMPLETE CBC W/AUTO DIFF WBC: CPT

## 2022-07-23 PROCEDURE — 84132 ASSAY OF SERUM POTASSIUM: CPT

## 2022-07-23 PROCEDURE — 85730 THROMBOPLASTIN TIME PARTIAL: CPT

## 2022-07-23 PROCEDURE — 36558 INSERT TUNNELED CV CATH: CPT

## 2022-07-23 PROCEDURE — 86900 BLOOD TYPING SEROLOGIC ABO: CPT

## 2022-07-23 PROCEDURE — C1887: CPT

## 2022-07-23 PROCEDURE — 76937 US GUIDE VASCULAR ACCESS: CPT

## 2022-07-23 PROCEDURE — 93990 DOPPLER FLOW TESTING: CPT

## 2022-07-23 PROCEDURE — 86850 RBC ANTIBODY SCREEN: CPT

## 2022-07-23 RX ORDER — ACETAMINOPHEN 500 MG
1000 TABLET ORAL ONCE
Refills: 0 | Status: COMPLETED | OUTPATIENT
Start: 2022-07-23 | End: 2022-07-23

## 2022-07-23 RX ORDER — CILOSTAZOL 100 MG/1
1 TABLET ORAL
Qty: 0 | Refills: 0 | DISCHARGE

## 2022-07-23 RX ADMIN — Medication 500 MILLIGRAM(S): at 17:02

## 2022-07-23 RX ADMIN — Medication 1: at 12:25

## 2022-07-23 RX ADMIN — Medication 81 MILLIGRAM(S): at 17:02

## 2022-07-23 RX ADMIN — Medication 1 APPLICATION(S): at 17:02

## 2022-07-23 RX ADMIN — CARVEDILOL PHOSPHATE 6.25 MILLIGRAM(S): 80 CAPSULE, EXTENDED RELEASE ORAL at 17:02

## 2022-07-23 RX ADMIN — LISINOPRIL 2.5 MILLIGRAM(S): 2.5 TABLET ORAL at 05:24

## 2022-07-23 RX ADMIN — SODIUM CHLORIDE 75 MILLILITER(S): 9 INJECTION, SOLUTION INTRAVENOUS at 07:24

## 2022-07-23 RX ADMIN — Medication 1000 MILLIGRAM(S): at 10:00

## 2022-07-23 RX ADMIN — CARVEDILOL PHOSPHATE 6.25 MILLIGRAM(S): 80 CAPSULE, EXTENDED RELEASE ORAL at 05:25

## 2022-07-23 RX ADMIN — CLOPIDOGREL BISULFATE 75 MILLIGRAM(S): 75 TABLET, FILM COATED ORAL at 17:02

## 2022-07-23 RX ADMIN — Medication 400 MILLIGRAM(S): at 09:11

## 2022-07-23 NOTE — DISCHARGE NOTE PROVIDER - NSDCMRMEDTOKEN_GEN_ALL_CORE_FT
aspirin 81 mg oral delayed release tablet: 1 tab(s) orally once a day  atorvastatin 40 mg oral tablet: 1 tab(s) orally once a day (at bedtime)  betamethasone dipropionate 0.05% topical cream: Apply topically to affected area 2 times a day **Non-dialysis days**  carvedilol 6.25 mg oral tablet: 1 tab(s) orally every 12 hours  cilostazol 50 mg oral tablet: 1 tab(s) orally 2 times a day  clopidogrel 75 mg oral tablet: 1 tab(s) orally once a day  clotrimazole 1% topical cream: Apply topically to affected area 2 times a day.**Non-dialysis days**  Januvia 100 mg oral tablet: 1 tab(s) orally once a day  lisinopril 2.5 mg oral tablet: 1 tab(s) orally once a day  Nanette-Dharmesh oral tablet: 1 tab(s) orally once a day  Vitamin C 500 mg oral tablet: 1 tab(s) orally once a day   aspirin 81 mg oral delayed release tablet: 1 tab(s) orally once a day  atorvastatin 40 mg oral tablet: 1 tab(s) orally once a day (at bedtime)  betamethasone dipropionate 0.05% topical cream: Apply topically to affected area 2 times a day **Non-dialysis days**  carvedilol 6.25 mg oral tablet: 1 tab(s) orally every 12 hours  clopidogrel 75 mg oral tablet: 1 tab(s) orally once a day  clotrimazole 1% topical cream: Apply topically to affected area 2 times a day.**Non-dialysis days**  Januvia 100 mg oral tablet: 1 tab(s) orally once a day  lisinopril 2.5 mg oral tablet: 1 tab(s) orally once a day  Nanette-Dharmesh oral tablet: 1 tab(s) orally once a day  Vitamin C 500 mg oral tablet: 1 tab(s) orally once a day

## 2022-07-23 NOTE — PROGRESS NOTE ADULT - PROBLEM SELECTOR PLAN 4
HbA1C 5.8    finger sticks with short acting insulin sliding scale  no oral meds
HbA1C 5.8  finger sticks with short acting insulin sliding scale  no oral meds

## 2022-07-23 NOTE — PROGRESS NOTE ADULT - NSPROGADDITIONALINFOA_GEN_ALL_CORE
discharge planning after hemodialysis today
discussed with patient in detail, expresses understanding of treatment plans.  discussed with renal  likely discharge tomorrow after hemodialysis session

## 2022-07-23 NOTE — DISCHARGE NOTE PROVIDER - NSDCFUSCHEDAPPT_GEN_ALL_CORE_FT
[facility]ician [facility]  CARDIOLOGY [address] [facility]   Scheduled Appointment: 08/17/2022

## 2022-07-23 NOTE — PROGRESS NOTE ADULT - SUBJECTIVE AND OBJECTIVE BOX
CARDIOLOGY FOLLOW UP - Dr. Fontenot  Date of Service: 7/23/2022  CC: s/p permacath, no events    Review of Systems:  Constitutional: No fever, weight loss, or fatigue  Respiratory: No cough, wheezing, or hemoptysis, no shortness of breath  Cardiovascular: No chest pain, palpitations, passing out, dizziness, or leg swelling  Gastrointestinal: No abd or epigastric pain. No nausea, vomiting, or hematemesis; no diarrhea or consiptaiton, no melena or hematochezia  Vascular: No edema     TELEMETRY:    PHYSICAL EXAM:  T(C): 36.8 (07-23-22 @ 05:02), Max: 36.8 (07-23-22 @ 05:02)  HR: 73 (07-23-22 @ 07:25) (64 - 79)  BP: 124/55 (07-23-22 @ 07:25) (104/68 - 175/85)  RR: 19 (07-23-22 @ 07:25) (14 - 19)  SpO2: 96% (07-23-22 @ 07:25) (94% - 99%)  Wt(kg): --  I&O's Summary    22 Jul 2022 07:01  -  23 Jul 2022 07:00  --------------------------------------------------------  IN: 1220 mL / OUT: 2650 mL / NET: -1430 mL        Appearance: Normal	  Cardiovascular: Normal S1 S2,RRR, No JVD, No murmurs  Respiratory: Lungs clear to auscultation	  Gastrointestinal:  Soft, Non-tender, + BS	  Extremities: Normal range of motion, No clubbing, cyanosis or edema  Vascular: Peripheral pulses palpable 2+ bilaterally       Home Medications:  aspirin 81 mg oral delayed release tablet: 1 tab(s) orally once a day (21 Jul 2022 19:07)  atorvastatin 40 mg oral tablet: 1 tab(s) orally once a day (at bedtime) (21 Jul 2022 19:07)  betamethasone dipropionate 0.05% topical cream: Apply topically to affected area 2 times a day **Non-dialysis days** (21 Jul 2022 19:07)  carvedilol 6.25 mg oral tablet: 1 tab(s) orally every 12 hours (21 Jul 2022 19:07)  cilostazol 50 mg oral tablet: 1 tab(s) orally 2 times a day (21 Jul 2022 19:07)  clotrimazole 1% topical cream: Apply topically to affected area 2 times a day.**Non-dialysis days** (21 Jul 2022 19:07)  lisinopril 2.5 mg oral tablet: 1 tab(s) orally once a day (21 Jul 2022 19:07)  Annette-Dharmesh oral tablet: 1 tab(s) orally once a day (21 Jul 2022 19:07)  Vitamin C 500 mg oral tablet: 1 tab(s) orally once a day (21 Jul 2022 19:07)        MEDICATIONS  (STANDING):  ascorbic acid 500 milliGRAM(s) Oral daily  aspirin enteric coated 81 milliGRAM(s) Oral daily  atorvastatin 40 milliGRAM(s) Oral at bedtime  carvedilol 6.25 milliGRAM(s) Oral every 12 hours  chlorhexidine 4% Liquid 1 Application(s) Topical <User Schedule>  clopidogrel Tablet 75 milliGRAM(s) Oral daily  dextrose 5%. 1000 milliLiter(s) (100 mL/Hr) IV Continuous <Continuous>  dextrose 5%. 1000 milliLiter(s) (50 mL/Hr) IV Continuous <Continuous>  dextrose 50% Injectable 25 Gram(s) IV Push once  dextrose 50% Injectable 12.5 Gram(s) IV Push once  dextrose 50% Injectable 25 Gram(s) IV Push once  glucagon  Injectable 1 milliGRAM(s) IntraMuscular once  insulin lispro (ADMELOG) corrective regimen sliding scale   SubCutaneous three times a day before meals  lactated ringers. 1000 milliLiter(s) (75 mL/Hr) IV Continuous <Continuous>  lisinopril 2.5 milliGRAM(s) Oral daily  triamcinolone 0.1% Ointment 1 Application(s) Topical two times a day        EKG:  RADIOLOGY:  DIAGNOSTIC TESTING:  [ ] Echocardiogram:  [ ] Catherterization:  [ ] Stress Test:  OTHER:     LABS:	 	                          9.7    6.28  )-----------( 153      ( 22 Jul 2022 04:43 )             30.3     07-23    136  |  96  |  38<H>  ----------------------------<  101<H>  4.0   |  25  |  6.21<H>    Ca    8.9      23 Jul 2022 06:43  Phos  7.9     07-21  Mg     2.3     07-23    TPro  7.4  /  Alb  4.3  /  TBili  0.3  /  DBili  x   /  AST  10  /  ALT  8<L>  /  AlkPhos  96  07-21      PT/INR - ( 22 Jul 2022 04:43 )   PT: 12.3 sec;   INR: 1.06 ratio         PTT - ( 21 Jul 2022 17:23 )  PTT:30.8 sec    CARDIAC MARKERS:                  
[facility]   [phone]  RENAL FOLLOW UP NOTE  --------------------------------------------------------------------------------  Chief Complaint:    24 hour events/subjective:    seen earlier   states feels better   o/n had hd cath site bleed none when seen     PAST HISTORY  --------------------------------------------------------------------------------  No significant changes to PMH, PSH, FHx, SHx, unless otherwise noted    ALLERGIES & MEDICATIONS  --------------------------------------------------------------------------------  Allergies    No Known Allergies    Intolerances      Standing Inpatient Medications  ascorbic acid 500 milliGRAM(s) Oral daily  aspirin enteric coated 81 milliGRAM(s) Oral daily  atorvastatin 40 milliGRAM(s) Oral at bedtime  carvedilol 6.25 milliGRAM(s) Oral every 12 hours  chlorhexidine 4% Liquid 1 Application(s) Topical <User Schedule>  clopidogrel Tablet 75 milliGRAM(s) Oral daily  dextrose 5%. 1000 milliLiter(s) IV Continuous <Continuous>  dextrose 5%. 1000 milliLiter(s) IV Continuous <Continuous>  dextrose 50% Injectable 25 Gram(s) IV Push once  dextrose 50% Injectable 12.5 Gram(s) IV Push once  dextrose 50% Injectable 25 Gram(s) IV Push once  glucagon  Injectable 1 milliGRAM(s) IntraMuscular once  insulin lispro (ADMELOG) corrective regimen sliding scale   SubCutaneous three times a day before meals  lisinopril 2.5 milliGRAM(s) Oral daily  triamcinolone 0.1% Ointment 1 Application(s) Topical two times a day    PRN Inpatient Medications  dextrose Oral Gel 15 Gram(s) Oral once PRN  sodium chloride 0.9% lock flush 10 milliLiter(s) IV Push every 1 hour PRN      REVIEW OF SYSTEMS  --------------------------------------------------------------------------------    Gen: denies  fevers/chills,  CVS: denies chest pain/palpitations  Resp: denies SOB/Cough  GI: Denies N/V/Abd pain  : Denies dysuria      VITALS/PHYSICAL EXAM  --------------------------------------------------------------------------------  T(C): 36.7 (07-23-22 @ 17:00), Max: 36.8 (07-23-22 @ 05:02)  HR: 78 (07-23-22 @ 17:00) (73 - 79)  BP: 137/84 (07-23-22 @ 17:00) (114/59 - 153/75)  RR: 18 (07-23-22 @ 17:00) (17 - 19)  SpO2: 99% (07-23-22 @ 17:00) (96% - 99%)  Wt(kg): --  Height (cm): 175.3 (07-22-22 @ 17:30)  Weight (kg): 90.7 (07-22-22 @ 17:30)  BMI (kg/m2): 29.5 (07-22-22 @ 17:30)  BSA (m2): 2.07 (07-22-22 @ 17:30)      07-22-22 @ 07:01  -  07-23-22 @ 07:00  --------------------------------------------------------  IN: 1220 mL / OUT: 2650 mL / NET: -1430 mL    07-23-22 @ 07:01  -  07-23-22 @ 23:23  --------------------------------------------------------  IN: 0 mL / OUT: 2500 mL / NET: -2500 mL      Physical Exam:  	    Physical Exam:  	Gen: alert oriented place person and date  IJ cath site dry blood   	Pulm: Decreased breath sounds b/l bases. no rales or ronchi or wheezing  	CV: RRR, S1/S2. no rub  	Back: No CVA tenderness  	Abd: +BS, soft, nontender/nondistended  	: No suprapubic tenderness.               Extremity: No cyanosis, no edema no clubbing  AVF site skin eczema lesion         LABS/STUDIES  --------------------------------------------------------------------------------              9.7    6.28  >-----------<  153      [07-22-22 @ 04:43]              30.3     136  |  96  |  38  ----------------------------<  101      [07-23-22 @ 06:43]  4.0   |  25  |  6.21        Ca     8.9     [07-23-22 @ 06:43]      Mg     2.3     [07-23-22 @ 06:43]      PT/INR: PT 12.3 , INR 1.06       [07-22-22 @ 04:43]      Creatinine Trend:  SCr 6.21 [07-23 @ 06:43]  SCr 11.59 [07-22 @ 04:43]  SCr 11.72 [07-21 @ 20:09]  SCr 11.68 [07-21 @ 17:23]                  TSH 5.16      [07-22-22 @ 04:43]      
INTERVAL HPI/OVERNIGHT EVENTS:  - responding well to TAC ointment; pt states rash is less red and irritated  - no new lesions/rashes on body  - no new occular symptoms or dysuria    MEDICATIONS  (STANDING):  ascorbic acid 500 milliGRAM(s) Oral daily  aspirin enteric coated 81 milliGRAM(s) Oral daily  atorvastatin 40 milliGRAM(s) Oral at bedtime  carvedilol 6.25 milliGRAM(s) Oral every 12 hours  chlorhexidine 4% Liquid 1 Application(s) Topical <User Schedule>  clopidogrel Tablet 75 milliGRAM(s) Oral daily  dextrose 5%. 1000 milliLiter(s) (100 mL/Hr) IV Continuous <Continuous>  dextrose 5%. 1000 milliLiter(s) (50 mL/Hr) IV Continuous <Continuous>  dextrose 50% Injectable 25 Gram(s) IV Push once  dextrose 50% Injectable 12.5 Gram(s) IV Push once  dextrose 50% Injectable 25 Gram(s) IV Push once  glucagon  Injectable 1 milliGRAM(s) IntraMuscular once  insulin lispro (ADMELOG) corrective regimen sliding scale   SubCutaneous three times a day before meals  lisinopril 2.5 milliGRAM(s) Oral daily  triamcinolone 0.1% Ointment 1 Application(s) Topical two times a day    MEDICATIONS  (PRN):  dextrose Oral Gel 15 Gram(s) Oral once PRN Blood Glucose LESS THAN 70 milliGRAM(s)/deciliter  sodium chloride 0.9% lock flush 10 milliLiter(s) IV Push every 1 hour PRN Pre/post blood products, medications, blood draw, and to maintain line patency      Allergies    No Known Allergies    Intolerances        REVIEW OF SYSTEMS  General: no fevers/chills, no lethargy  Skin/Breast: see HPI	  Ophthalmologic: no eye pain or change in vision	  ENMT: no dysphagia or change in hearing  Respiratory and Thorax: no SOB or cough  Cardiovascular: no palpitations or chest pain  Gastrointestinal: no abdominal pain or blood in stool   Genitourinary: no dysuria or frequency  Musculoskeletal: no joint pains or weakness	  Neurological: no weakness, numbness, or tingling      Vital Signs Last 24 Hrs  T(C): 36.7 (23 Jul 2022 17:00), Max: 36.8 (23 Jul 2022 05:02)  T(F): 98 (23 Jul 2022 17:00), Max: 98.3 (23 Jul 2022 05:02)  HR: 78 (23 Jul 2022 17:00) (73 - 79)  BP: 137/84 (23 Jul 2022 17:00) (114/59 - 164/95)  BP(mean): --  RR: 18 (23 Jul 2022 17:00) (17 - 19)  SpO2: 99% (23 Jul 2022 17:00) (94% - 99%)    Parameters below as of 23 Jul 2022 17:00  Patient On (Oxygen Delivery Method): room air          PHYSICAL EXAM:    General: Well appearing, well nourished, in no apparent distress  HEENT: Normocephalic, thyroid not visibly enlarged, conjunctiva not injected, oropharynx clear without ulcerations  CV: No lower extremity edema, extremities warm and well perfused, +dorsalis pedis pulses  Resp: Non labored breathing, no clubbing of extremities  GI: Non distended abdomen, no palpable hepatosplenomegaly  Lymph: No visible lymphadenopathy  Neuro: Alert and oriented x3  Skin: The scalp/hair, head/face, conjunctivae/lids, lips/teeth/gums, neck, digits/nails, right and left axilla, right and left upper and lower extremities, chest, abdomen, back, buttocks and groin area. No bromhidrosis or hyperhidrosis.    Of note on skin exam:  Poorly defined pink to red plaques on L forearm w overlying excoriations    LABS:                        9.7    6.28  )-----------( 153      ( 22 Jul 2022 04:43 )             30.3     07-23    136  |  96  |  38<H>  ----------------------------<  101<H>  4.0   |  25  |  6.21<H>    Ca    8.9      23 Jul 2022 06:43  Mg     2.3     07-23      PT/INR - ( 22 Jul 2022 04:43 )   PT: 12.3 sec;   INR: 1.06 ratio               RADIOLOGY & ADDITIONAL TESTS:
[facility]   [phone]  RENAL FOLLOW UP NOTE  --------------------------------------------------------------------------------  Chief Complaint:    24 hour events/subjective:    patient seen and examined with Dr. Muñoz  appears comfortable    PAST HISTORY  --------------------------------------------------------------------------------  No significant changes to PMH, PSH, FHx, SHx, unless otherwise noted    ALLERGIES & MEDICATIONS  --------------------------------------------------------------------------------  Allergies    No Known Allergies    Intolerances      Standing Inpatient Medications  dextrose 5%. 1000 milliLiter(s) IV Continuous <Continuous>  dextrose 5%. 1000 milliLiter(s) IV Continuous <Continuous>  dextrose 50% Injectable 25 Gram(s) IV Push once  dextrose 50% Injectable 12.5 Gram(s) IV Push once  dextrose 50% Injectable 25 Gram(s) IV Push once  glucagon  Injectable 1 milliGRAM(s) IntraMuscular once  insulin lispro (ADMELOG) corrective regimen sliding scale   SubCutaneous three times a day before meals    PRN Inpatient Medications  dextrose Oral Gel 15 Gram(s) Oral once PRN      REVIEW OF SYSTEMS  --------------------------------------------------------------------------------    Gen: +fatigue, denies fevers/chills,  CVS: denies chest pain/palpitations  Resp: denies SOB/Cough  GI: Denies N/V/Abd pain  : Denies dysuria    VITALS/PHYSICAL EXAM  --------------------------------------------------------------------------------  T(C): 36.7 (07-22-22 @ 04:25), Max: 36.7 (07-21-22 @ 13:56)  HR: 73 (07-22-22 @ 04:25) (71 - 76)  BP: 134/73 (07-22-22 @ 04:25) (116/66 - 147/73)  RR: 18 (07-22-22 @ 04:25) (16 - 18)  SpO2: 97% (07-22-22 @ 04:25) (97% - 99%)  Wt(kg): --  Height (cm): 175.3 (07-21-22 @ 13:56)  Weight (kg): 90.7 (07-21-22 @ 13:56)  BMI (kg/m2): 29.5 (07-21-22 @ 13:56)  BSA (m2): 2.07 (07-21-22 @ 13:56)      Physical Exam:  	  	Gen: appears comfortable, alert  	Pulm: decrease bs  no rales or ronchi or wheezing  	CV: RRR, S1S2; no rub  	Abd: +BS, soft, nontender/nondistended  	: No suprapubic tenderness  	UE: Warm, no cyanosis  no clubbing,  no edema;  	LE: Warm, no edema  	Skin: over avf site with excoriated skin more medial to avf and some over the avf site,   	Vascular access: L avf with proximal site near elbow with infiltration as well +bruit/thrill    LABS/STUDIES  --------------------------------------------------------------------------------              9.7    6.28  >-----------<  153      [07-22-22 @ 04:43]              30.3     140  |  100  |  100  ----------------------------<  172      [07-22-22 @ 04:43]  5.1   |  22  |  11.59        Ca     8.3     [07-22-22 @ 04:43]      Phos  7.9     [07-21-22 @ 17:23]    TPro  7.4  /  Alb  4.3  /  TBili  0.3  /  DBili  x   /  AST  10  /  ALT  8   /  AlkPhos  96  [07-21-22 @ 17:23]    PT/INR: PT 12.3 , INR 1.06       [07-22-22 @ 04:43]  PTT: 30.8       [07-21-22 @ 17:23]      Creatinine Trend:  SCr 11.59 [07-22 @ 04:43]  SCr 11.72 [07-21 @ 20:09]  SCr 11.68 [07-21 @ 17:23]                      
Patient is a 52y old  Male who presents with a chief complaint of missed hemodialysis, malfunctioning AVF (23 Jul 2022 09:35)      DATE OF SERVICE: 07-23-22 @ 12:14    SUBJECTIVE / OVERNIGHT EVENTS: overnight events noted  "I feel fine'     ROS:  Resp: No cough no sputum production  CVS: No chest pain no palpitations no orthopnea  GI: no N/V/D  : no dysuria, no hematuria  Neuro: no weakness no paresthesias  Heme: No petechiae no easy bruising  Msk: No joint pain no swelling  Skin: No rash no itching        MEDICATIONS  (STANDING):  ascorbic acid 500 milliGRAM(s) Oral daily  aspirin enteric coated 81 milliGRAM(s) Oral daily  atorvastatin 40 milliGRAM(s) Oral at bedtime  carvedilol 6.25 milliGRAM(s) Oral every 12 hours  chlorhexidine 4% Liquid 1 Application(s) Topical <User Schedule>  clopidogrel Tablet 75 milliGRAM(s) Oral daily  dextrose 5%. 1000 milliLiter(s) (100 mL/Hr) IV Continuous <Continuous>  dextrose 5%. 1000 milliLiter(s) (50 mL/Hr) IV Continuous <Continuous>  dextrose 50% Injectable 25 Gram(s) IV Push once  dextrose 50% Injectable 12.5 Gram(s) IV Push once  dextrose 50% Injectable 25 Gram(s) IV Push once  glucagon  Injectable 1 milliGRAM(s) IntraMuscular once  insulin lispro (ADMELOG) corrective regimen sliding scale   SubCutaneous three times a day before meals  lisinopril 2.5 milliGRAM(s) Oral daily  triamcinolone 0.1% Ointment 1 Application(s) Topical two times a day    MEDICATIONS  (PRN):  dextrose Oral Gel 15 Gram(s) Oral once PRN Blood Glucose LESS THAN 70 milliGRAM(s)/deciliter  sodium chloride 0.9% lock flush 10 milliLiter(s) IV Push every 1 hour PRN Pre/post blood products, medications, blood draw, and to maintain line patency        CAPILLARY BLOOD GLUCOSE      POCT Blood Glucose.: 117 mg/dL (23 Jul 2022 08:53)  POCT Blood Glucose.: 109 mg/dL (22 Jul 2022 20:55)  POCT Blood Glucose.: 99 mg/dL (22 Jul 2022 13:15)    I&O's Summary    22 Jul 2022 07:01  -  23 Jul 2022 07:00  --------------------------------------------------------  IN: 1220 mL / OUT: 2650 mL / NET: -1430 mL        Vital Signs Last 24 Hrs  T(C): 36.8 (23 Jul 2022 05:02), Max: 36.8 (23 Jul 2022 05:02)  T(F): 98.3 (23 Jul 2022 05:02), Max: 98.3 (23 Jul 2022 05:02)  HR: 73 (23 Jul 2022 07:25) (64 - 79)  BP: 124/55 (23 Jul 2022 07:25) (104/68 - 175/85)  BP(mean): 80 (22 Jul 2022 19:05) (80 - 80)  RR: 19 (23 Jul 2022 07:25) (14 - 19)  SpO2: 96% (23 Jul 2022 07:25) (94% - 99%)    PHYSICAL EXAM:   Lungs: no wheeze, no crackles  CVS: S1 S2 systolic murmur +   Abdomen: no tenderness  Neuro: AO x 3 nonfocal  Skin: warm, dry  Ext:  no edema LE  left arm AVF with oval circumscribed papules crusting over  Msk: no joint swelling or deformities  Back: no CVA tenderness    LABS:                        9.7    6.28  )-----------( 153      ( 22 Jul 2022 04:43 )             30.3     07-23    136  |  96  |  38<H>  ----------------------------<  101<H>  4.0   |  25  |  6.21<H>    Ca    8.9      23 Jul 2022 06:43  Phos  7.9     07-21  Mg     2.3     07-23    TPro  7.4  /  Alb  4.3  /  TBili  0.3  /  DBili  x   /  AST  10  /  ALT  8<L>  /  AlkPhos  96  07-21    PT/INR - ( 22 Jul 2022 04:43 )   PT: 12.3 sec;   INR: 1.06 ratio         PTT - ( 21 Jul 2022 17:23 )  PTT:30.8 sec            All consultant(s) notes reviewed and care discussed with other providers        Contact Number, Dr Murphy [phone]
Patient is a 52y old  Male who presents with a chief complaint of missed hemodialysis, malfunctioning AVF (22 Jul 2022 12:34)      DATE OF SERVICE: 07-22-22 @ 12:54    SUBJECTIVE / OVERNIGHT EVENTS: overnight events noted  "I feel better"     ROS:  Resp: No cough no sputum production  CVS: No chest pain no palpitations no orthopnea  GI: no N/V/D  : no dysuria, no hematuria  Neuro: no weakness no paresthesias  Heme: No petechiae no easy bruising  Msk: No joint pain no swelling  Skin: No rash no itching        MEDICATIONS  (STANDING):  dextrose 5%. 1000 milliLiter(s) (50 mL/Hr) IV Continuous <Continuous>  dextrose 5%. 1000 milliLiter(s) (100 mL/Hr) IV Continuous <Continuous>  dextrose 50% Injectable 25 Gram(s) IV Push once  dextrose 50% Injectable 12.5 Gram(s) IV Push once  dextrose 50% Injectable 25 Gram(s) IV Push once  glucagon  Injectable 1 milliGRAM(s) IntraMuscular once  insulin lispro (ADMELOG) corrective regimen sliding scale   SubCutaneous three times a day before meals    MEDICATIONS  (PRN):  dextrose Oral Gel 15 Gram(s) Oral once PRN Blood Glucose LESS THAN 70 milliGRAM(s)/deciliter        CAPILLARY BLOOD GLUCOSE      POCT Blood Glucose.: 99 mg/dL (22 Jul 2022 08:50)  POCT Blood Glucose.: 159 mg/dL (21 Jul 2022 19:26)  POCT Blood Glucose.: 119 mg/dL (21 Jul 2022 18:49)    I&O's Summary      Vital Signs Last 24 Hrs  T(C): 36.7 (22 Jul 2022 04:25), Max: 36.7 (21 Jul 2022 13:56)  T(F): 98.1 (22 Jul 2022 04:25), Max: 98.1 (22 Jul 2022 04:25)  HR: 73 (22 Jul 2022 04:25) (71 - 76)  BP: 134/73 (22 Jul 2022 04:25) (116/66 - 147/73)  BP(mean): --  RR: 18 (22 Jul 2022 04:25) (16 - 18)  SpO2: 97% (22 Jul 2022 04:25) (97% - 99%)    PHYSICAL EXAM:   HEENT: THOM EOMI  Neck: Supple  Lungs: no wheeze, no crackles  CVS: S1 S2 ejection systolic murmur +   Abdomen: no tenderness  Neuro: AO x 3 nonfocal  Skin: warm, dry  Ext: no cyanosis or clubbing, no edema LE  right foot second digit partial amputation   left arm AVF with oval circumscribed papules no change   Msk: no joint swelling or deformities  Back: no CVA tenderness, no kyphosis/scoliosis    LABS:                        9.7    6.28  )-----------( 153      ( 22 Jul 2022 04:43 )             30.3     07-22    140  |  100  |  100<H>  ----------------------------<  172<H>  5.1   |  22  |  11.59<H>    Ca    8.3<L>      22 Jul 2022 04:43  Phos  7.9     07-21    TPro  7.4  /  Alb  4.3  /  TBili  0.3  /  DBili  x   /  AST  10  /  ALT  8<L>  /  AlkPhos  96  07-21    PT/INR - ( 22 Jul 2022 04:43 )   PT: 12.3 sec;   INR: 1.06 ratio         PTT - ( 21 Jul 2022 17:23 )  PTT:30.8 sec            All consultant(s) notes reviewed and care discussed with other providers        Contact Number, Dr Murphy [phone]

## 2022-07-23 NOTE — DISCHARGE NOTE PROVIDER - HOSPITAL COURSE
This is a 51 yo M with hx of DM CAD, s/p CABG,  AICD on hemodialysis for approximately 5 years who has had AVF malfunction for the past few treatments admitted for management and evaluation      Malfunction of arteriovenous dialysis fistula.   s/p PermCath 7/22 followed by hemodialysis   hyperkalemia resolved s/p Lokelma 15 grams x 1.     Acute on chronic systolic heart failure.   -mild  -likely secondary to missed hemodialysis   -resolved with hemodialysis today.    Coronary artery disease.   - chest pain free  -continue home meds.     Diabetes mellitus.   -HbA1C 5.8  -finger sticks with short acting insulin sliding scale       ESRD on dialysis.   -s/p access   -Continue with HD.    PVD (peripheral vascular disease).   -s/p recent right foot digit amputation  no intervention at this time  outpatient follow up with vascular.     This is a 53 yo M with hx of DM CAD, s/p CABG,  AICD on hemodialysis for approximately 5 years who has had AVF malfunction for the past few treatments admitted for management and evaluation      Malfunction of arteriovenous dialysis fistula.   s/p PermCath 7/22 followed by hemodialysis   hyperkalemia resolved s/p Lokelma 15 grams x 1  -outpatient follow up with vascular for left arm AVF.     Acute on chronic systolic heart failure.   -mild  -likely secondary to missed hemodialysis   -resolved with hemodialysis today.    Coronary artery disease.   - chest pain free  -continue home meds.     Diabetes mellitus.   -HbA1C 5.8  -finger sticks with short acting insulin sliding scale       ESRD on dialysis.   -s/p access   -Continue with HD.    PVD (peripheral vascular disease).   -s/p recent right foot digit amputation  no intervention at this time  outpatient follow up with vascular.     This is a 53 yo M with hx of DM CAD, s/p CABG,  AICD on hemodialysis for approximately 5 years who has had AVF malfunction for the past few treatments admitted for management and evaluation      Malfunction of arteriovenous dialysis fistula.   s/p PermCath 7/22 followed by hemodialysis   hyperkalemia resolved s/p Lokelma 15 grams x 1  -outpatient follow up with vascular for left arm AVF.     Acute on chronic systolic heart failure.   -mild  -likely secondary to missed hemodialysis   -resolved with hemodialysis today.    Coronary artery disease.   - chest pain free  -continue home meds.     Diabetes mellitus.   -HbA1C 5.8  -finger sticks with short acting insulin sliding scale       ESRD on dialysis.   -s/p access   -Continue with HD.    PVD (peripheral vascular disease).   -s/p recent right foot digit amputation  no intervention at this time  outpatient follow up with vascular.    Medically cleared for d/c home.

## 2022-07-23 NOTE — DISCHARGE NOTE NURSING/CASE MANAGEMENT/SOCIAL WORK - [id number]_ALL_CORE
For information on Fall & Injury Prevention, visit: [email or web address] OR  [email or web address] OR  https://www.cdc.gov/steadi/patient.html

## 2022-07-23 NOTE — PROGRESS NOTE ADULT - PROBLEM SELECTOR PLAN 6
outpatient follow up with vascular
s/p recent right foot digit amputation  no intervention at this time  outpatient follow up with vascular

## 2022-07-23 NOTE — DISCHARGE NOTE PROVIDER - NSDCCPCAREPLAN_GEN_ALL_CORE_FT
PRINCIPAL DISCHARGE DIAGNOSIS  Diagnosis: Hyperkalemia  Assessment and Plan of Treatment: Resolved.  Due to malfunctioning AVF.      SECONDARY DISCHARGE DIAGNOSES  Diagnosis: Malfunction of arteriovenous dialysis fistula  Assessment and Plan of Treatment: Follow up as outpatient.    Diagnosis: Coronary artery disease  Assessment and Plan of Treatment: Continue to take your home medications as prescribed.    Diagnosis: Diabetes mellitus  Assessment and Plan of Treatment: Continue to take your home medications as prescribed.    Diagnosis: Acute on chronic systolic heart failure  Assessment and Plan of Treatment: Resolved.     PRINCIPAL DISCHARGE DIAGNOSIS  Diagnosis: Hyperkalemia  Assessment and Plan of Treatment: Resolved.  Due to malfunctioning AVF.      SECONDARY DISCHARGE DIAGNOSES  Diagnosis: Malfunction of arteriovenous dialysis fistula  Assessment and Plan of Treatment: Follow up with vascular as outpatient.    Diagnosis: Coronary artery disease  Assessment and Plan of Treatment: Continue to take your home medications as prescribed.    Diagnosis: Diabetes mellitus  Assessment and Plan of Treatment: Continue to take your home medications as prescribed.    Diagnosis: Acute on chronic systolic heart failure  Assessment and Plan of Treatment: Resolved.     PRINCIPAL DISCHARGE DIAGNOSIS  Diagnosis: Hyperkalemia  Assessment and Plan of Treatment: Resolved.  Due to malfunctioning AVF.      SECONDARY DISCHARGE DIAGNOSES  Diagnosis: Malfunction of arteriovenous dialysis fistula  Assessment and Plan of Treatment: Follow up with vascular as outpatient.    Diagnosis: Coronary artery disease  Assessment and Plan of Treatment: Continue to take your home medications as prescribed.    Diagnosis: Diabetes mellitus  Assessment and Plan of Treatment: Continue to take your home medications as prescribed.    Diagnosis: ESRD on dialysis  Assessment and Plan of Treatment: Continue dialysis as scheduled via permcath.    Diagnosis: Acute on chronic systolic heart failure  Assessment and Plan of Treatment: Resolved.     PRINCIPAL DISCHARGE DIAGNOSIS  Diagnosis: Hyperkalemia  Assessment and Plan of Treatment: Resolved.  Due to malfunctioning AVF.      SECONDARY DISCHARGE DIAGNOSES  Diagnosis: Malfunction of arteriovenous dialysis fistula  Assessment and Plan of Treatment: Follow up with vascular as outpatient.  Please make an appointment to see Dr. Brasher (vasc surg) within one week of discharge for further work up of fistula.    Diagnosis: Coronary artery disease  Assessment and Plan of Treatment: Continue to take your home medications as prescribed.    Diagnosis: Diabetes mellitus  Assessment and Plan of Treatment: Continue to take your home medications as prescribed.    Diagnosis: ESRD on dialysis  Assessment and Plan of Treatment: Continue dialysis as scheduled via permcat.    Diagnosis: Acute on chronic systolic heart failure  Assessment and Plan of Treatment: Resolved.

## 2022-07-23 NOTE — PROGRESS NOTE ADULT - PROBLEM SELECTOR PLAN 1
PermCath today followed by hemodialysis   hyperkalemia resolved  s/p Lokelma 15 grams x 1
s/p Perm Cath and hemodialysis yesterday  another session today  set up for outpatient hemodialysis as before   outpatient follow up vascular for left arm AVF

## 2022-07-23 NOTE — DISCHARGE NOTE NURSING/CASE MANAGEMENT/SOCIAL WORK - PATIENT PORTAL LINK FT
You can access the FollowMyHealth Patient Portal offered by [facility] by registering at the following website: [email or web address]. By joining [employer]’s FollowMyHealth portal, you will also be able to view your health information using other applications (apps) compatible with our system.

## 2022-07-23 NOTE — PROGRESS NOTE ADULT - PROBLEM SELECTOR PLAN 2
mild  likely secondary to missed hemodialysis   no emergent need for ultrafiltration  discussed with renal  will resolve with hemodialysis today
mild on admission  now euvolemic

## 2022-07-23 NOTE — DISCHARGE NOTE PROVIDER - CARE PROVIDERS DIRECT ADDRESSES
,DirectAddress_Unknown ,DirectAddress_Unknown,[email or web address]

## 2022-07-23 NOTE — DISCHARGE NOTE PROVIDER - CARE PROVIDER_API CALL
Bharath Romo  INTERNAL MEDICINE  [address], [address]  [place] [address]  Phone: [phone]  Fax: [phone]  Follow Up Time: 1 week   Bharath Romo  INTERNAL MEDICINE  [address], [address]  [place] [address]  Phone: [phone]  Fax: [phone]  Follow Up Time: 1 week    Eli Brasher)  Surgery  [address], [address]  [place] [address]  Phone: [phone]  Fax: [phone]  Follow Up Time: 1 week

## 2022-07-23 NOTE — PROGRESS NOTE ADULT - PROBLEM SELECTOR PLAN 3
chest pain free  continue home meds with hold parameters
chest pain free  continue home meds with hold parameters

## 2022-07-23 NOTE — CHART NOTE - NSCHARTNOTEFT_GEN_A_CORE
was notified that primary team is discharging patient  patient likely needs fistulogram of AVF d/t malfunction  please have patient follow up with Dr. Malone or Dr. Brasher next week    VASCULAR SURGERY  [id number]
Called regarding bleeding around permcath site. Patient seen and examined at bedside. No bleeding noted today. Dry dressing.

## 2022-07-23 NOTE — PROGRESS NOTE ADULT - REASON FOR ADMISSION
missed hemodialysis, malfunctioning AVF

## 2022-07-23 NOTE — PROGRESS NOTE ADULT - ASSESSMENT
Assessment and Plan  Eczematous dermatitis, favor contact dermatitis, possibly 2/2 adhesives  At this time recommend:  -c/w triamcinolone acetonide 0.1% BID. Avoid prolonged use (>2 weeks without breaks) to prevent unwanted side effects such as atrophy, striae and telangiectasias  -Recommend gentle soaps to cleanse the area (cetaphil, cerave, would avoid palmolive) and daily use of creams (cetaphil, cerave) or ointments (aquaphor, plain vaseline) for routine moisturization      The patient's chart was reviewed in addition to being seen and examined at bedside with the dermatology attending Dr. Ezio Busch  Please reconsult dermatology as needed at pager [phone] w/10 digit call back number for further related questions.    Slava Byrd MD  Resident Physician, PGY2  [facility]  Pager: [phone]
This is 51 yo M with hx of DM  cad  cabg AICD on hd for approx 5 years who has had avf malfunction for the past few treatments. he has had a rash over avf site. attempt by [facility] was made to angioplasty the proximal access site for use however, at the hd center has not been able to access avf for hd. pt last hd was 5 days PTA    1- ESRD  2- DM   3- avf site rash   4- cad      IR to place tunneled HD catheter today  HD today  F160, 210 min, , , 2L fluid removal  see HD flowsheet  K+ in range today  Low K diet  duplex of LUE avf  ID and derm to evaluate pt avf for his ? fungal rash   d/w IR
This is 53 yo M with hx of DM  cad  cabg AICD on hd for approx 5 years who has had avf malfunction for the past few treatments. he has had a rash over avf site. attempt by [facility] was made to angioplasty the proximal access site for use however, at the hd center has not been able to access avf for hd. pt last hd was 5 days PTA    1- ESRD  2- DM   3- avf site rash   4- cad        HD today  F160, 210 min, , , 2L fluid removal  see HD flowsheet  to follow up with vascular outpt   
Cardiac Cath Lab - Adult (11.21.16 @ 09:47) >  INTERVENTIONAL RECOMMENDATIONS: CTA evaluation for CABG in light of  multivessel disease, ostial LAD  at atrifurcation of a large ramus and  diagonal, diabetes, severe cardiomyopathy, and CKD.  Prepared and signed by  Edinson Fontenot M.D.  TTE with Doppler (w/Cont) (01.14.22 @ 08:10) >  EF (Visual Estimate): 40 % Mild-moderate mitral regurgitation.  Moderate global left ventricular systolic dysfunction. Normal left ventricular internal dimensions and wall thicknesses. Mild diastolic dysfunction (Stage I).; grossly reduced right ventricular systolic function. Normal tricuspid valve.     a/p   51 yo M with hx of DM2 CAD, s/p CABG,  SYS CHF sp  AICD , ESRD on hemodialysis   presents with co of missed hemodialysis, malfunctioning AVF     #malfx AVF  -s/p permacath   -vascular fu     #chronic Systolic CHF sp AICD  -volume status appears stable  -fluid removal with HD   -cont BB. acei     #CAD sp CABG  -cv stable, resume asa post IR procedure   -cont statin, BB       35 minutes spent on total encounter; more than 50% of the visit was spent counseling and/or coordinating care by the attending physician.    
This is a 51 yo M with hx of DM CAD, s/p CABG,  AICD on hemodialysis for approximately 5 years who has had AVF malfunction for the past few treatments admitted for management and evaluation 
This is a 51 yo M with hx of DM CAD, s/p CABG,  AICD on hemodialysis for approximately 5 years who has had AVF malfunction for the past few treatments admitted for management and evaluation

## 2022-07-23 NOTE — DISCHARGE NOTE PROVIDER - PROVIDER TOKENS
PROVIDER:[TOKEN:[[id number]],FOLLOWUP:[1 week]] PROVIDER:[TOKEN:[[id number]],FOLLOWUP:[1 week]],PROVIDER:[TOKEN:[[id number]],FOLLOWUP:[1 week]]

## 2022-07-29 ENCOUNTER — NON-APPOINTMENT (OUTPATIENT)
Age: 52
End: 2022-07-29

## 2022-07-29 NOTE — PHYSICAL THERAPY INITIAL EVALUATION ADULT - THERAPY FREQUENCY, PT EVAL
1-2x/week Dupixent Pregnancy And Lactation Text: This medication likely crosses the placenta but the risk for the fetus is uncertain. This medication is excreted in breast milk.

## 2022-08-01 DIAGNOSIS — Z01.812 ENCOUNTER FOR PREPROCEDURAL LABORATORY EXAMINATION: ICD-10-CM

## 2022-08-16 NOTE — PROGRESS NOTE ADULT - PROBLEM/PLAN-5
DISPLAY PLAN FREE TEXT
[id number]

## 2022-08-17 ENCOUNTER — APPOINTMENT (OUTPATIENT)
Dept: CARDIOLOGY | Facility: CLINIC | Age: 52
End: 2022-08-17

## 2022-08-17 PROCEDURE — 99072 ADDL SUPL MATRL&STAF TM PHE: CPT

## 2022-08-17 PROCEDURE — 93015 CV STRESS TEST SUPVJ I&R: CPT

## 2022-08-17 PROCEDURE — 78452 HT MUSCLE IMAGE SPECT MULT: CPT

## 2022-08-17 PROCEDURE — A9500: CPT

## 2022-08-30 ENCOUNTER — APPOINTMENT (OUTPATIENT)
Dept: VASCULAR SURGERY | Facility: CLINIC | Age: 52
End: 2022-08-30

## 2022-09-22 ENCOUNTER — APPOINTMENT (OUTPATIENT)
Dept: GASTROENTEROLOGY | Facility: CLINIC | Age: 52
End: 2022-09-22

## 2022-09-22 VITALS
DIASTOLIC BLOOD PRESSURE: 72 MMHG | TEMPERATURE: 206.96 F | WEIGHT: 205 LBS | BODY MASS INDEX: 30.36 KG/M2 | OXYGEN SATURATION: 98 % | RESPIRATION RATE: 12 BRPM | SYSTOLIC BLOOD PRESSURE: 120 MMHG | HEIGHT: 68.98 IN | HEART RATE: 88 BPM

## 2022-09-22 PROCEDURE — 99204 OFFICE O/P NEW MOD 45 MIN: CPT

## 2022-09-22 PROCEDURE — 99072 ADDL SUPL MATRL&STAF TM PHE: CPT

## 2022-09-22 NOTE — ASSESSMENT
[FreeTextEntry1] : ROSA CONDE was advised to undergo colonoscopy to which he agreed. The procedure will be performed in [facility] with the assistance of an anesthesiologist. The patient was given a Golytely preparation prescription and understood the \par procedure as it was explained to his. He was given a booklet distributed by the [employer] Society of Gastrointestinal\par  Endoscopy explaining the procedure in detail and he understood the risks of the procedure not limited to infection, bleeding,\par perforation or non- diagnosis of colorectal cancer. He was advised that he could not drive home, if he chooses to\par  receive sedation.\par \par Further diagnostic and treatment recommendations will be based upon the procedure and any biopsies, if they are taken.\par \par Thank you for allowing me to participate in this [facility] health care.\par \par , Best personal regards -- Don\par

## 2022-09-22 NOTE — CONSULT LETTER
[Dear  ___] : Dear  [unfilled], [Consult Letter:] : I had the pleasure of evaluating your patient, [unfilled]. [( Thank you for referring [unfilled] for consultation for _____ )] : Thank you for referring [unfilled] for consultation for [unfilled] [Please see my note below.] : Please see my note below. [Consult Closing:] : Thank you very much for allowing me to participate in the care of this patient.  If you have any questions, please do not hesitate to contact me. [Sincerely,] : Sincerely, [FreeTextEntry3] : Don\par \par Nguyễn Kearns MD\par \par Gastroenterology\par [facility]\par [facility]\par \par

## 2022-09-22 NOTE — HISTORY OF PRESENT ILLNESS
[FreeTextEntry1] : He is a 52-year-old asymptomatic male referred for a prekidney transplant evaluation of his colon.  He has never had a screening colonoscopy. He denies a family history of colon cancer.  He is on dialysis Monday, Wednesday and Friday

## 2022-10-06 ENCOUNTER — APPOINTMENT (OUTPATIENT)
Dept: PULMONOLOGY | Facility: CLINIC | Age: 52
End: 2022-10-06

## 2022-10-06 VITALS
TEMPERATURE: 98.4 F | WEIGHT: 210 LBS | BODY MASS INDEX: 31.1 KG/M2 | HEIGHT: 69 IN | SYSTOLIC BLOOD PRESSURE: 150 MMHG | OXYGEN SATURATION: 99 % | HEART RATE: 80 BPM | DIASTOLIC BLOOD PRESSURE: 89 MMHG

## 2022-10-06 DIAGNOSIS — Z81.8 FAMILY HISTORY OF OTHER MENTAL AND BEHAVIORAL DISORDERS: ICD-10-CM

## 2022-10-06 DIAGNOSIS — N18.4 CHRONIC KIDNEY DISEASE, STAGE 4 (SEVERE): ICD-10-CM

## 2022-10-06 DIAGNOSIS — I50.22 CHRONIC SYSTOLIC (CONGESTIVE) HEART FAILURE: ICD-10-CM

## 2022-10-06 PROCEDURE — ZZZZZ: CPT

## 2022-10-06 PROCEDURE — 94729 DIFFUSING CAPACITY: CPT

## 2022-10-06 PROCEDURE — 94010 BREATHING CAPACITY TEST: CPT

## 2022-10-06 PROCEDURE — 99204 OFFICE O/P NEW MOD 45 MIN: CPT | Mod: 25

## 2022-10-06 PROCEDURE — 94726 PLETHYSMOGRAPHY LUNG VOLUMES: CPT

## 2022-10-06 RX ORDER — CILOSTAZOL 50 MG/1
50 TABLET ORAL
Qty: 60 | Refills: 6 | Status: COMPLETED | COMMUNITY
Start: 2022-02-23 | End: 2022-10-06

## 2022-10-06 RX ORDER — CLOPIDOGREL BISULFATE 75 MG/1
75 TABLET, FILM COATED ORAL DAILY
Refills: 6 | Status: COMPLETED | COMMUNITY
Start: 2022-02-17 | End: 2022-10-06

## 2022-10-07 PROBLEM — N18.4 CKD (CHRONIC KIDNEY DISEASE), STAGE IV: Status: ACTIVE | Noted: 2017-05-09

## 2022-10-07 PROBLEM — I50.22 CHRONIC SYSTOLIC CHF (CONGESTIVE HEART FAILURE), NYHA CLASS 3: Status: ACTIVE | Noted: 2017-05-08

## 2022-10-07 PROBLEM — Z81.8 FAMILY HISTORY OF DEMENTIA: Status: ACTIVE | Noted: 2022-10-06

## 2022-10-07 NOTE — ASSESSMENT
[FreeTextEntry1] : Mr. Salvador is a 52 year-old male with a history of HTN, HLD, DM2, CAD s/p CABG, ischemic cardiomyopathy with chronic systolic heart failure s/p AICD, ESRD on dialysis, PVD s/p stents (mid and distal SFA, pop and PT trunk), s/p R foot hallux amputation, and left lower lobe rounded atelectasis with pleural effusion who presents for pulmonary evaluation prior to kidney transplant. Of note, he was recently hospitalized at [facility] in July for AVF malfunction. S/p permacath placement by IR. He denies any chest pain, cough, wheezing, or dyspnea at rest. Has mild dyspnea/fatigue with exertion, but denies limitation in his daily activities. No fevers, chills, sputum production. Has rounded atelectasis and chronic left pleural effusions since his CABG surgery Nov 2016.\par \par CT Chest (July 2022) with left subpleural mass within the left lower lobe measuring approximately 5.9 x 1.9 and by 4.9 cm with internal calcifications and consistent with round atelectasis. Additional areas of linear atelectasis and/or scarring in the left lung. Small loculated left pleural effusion. Parietal and visceral pleural calcifications noted. No thoracic lymphadenopathy. Bilateral atrophic kidneys. No hydronephrosis. Extensive atherosclerotic calcifications with calcification of the aorta, celiac axis, SMA, and bilateral renal arteries. Moderate atherosclerotic calcification in the bilateral common iliac artery. Dense calcifications in the bilateral internal iliac arteries. Minimal calcifications in the bilateral external iliac arteries.\par \par PFTs (Oct 2022) with moderate restriction, low lung volumes, and mild loss of functioning alveolar units (corrected DLCO after adjusting for his anemia with HGB 9.5 is 62%).\par \par During exam, patient received call from home that mother was sick. He left urgently, but I checked on him later in the day. Mother is stable, although chronically sick. He will schedule follow-up soon.\par \par I asked him to repeat CT chest in December to assess for stability of the left pleural effusion and rounded atelectasis, which appears to be chronic since cardiac surgery in Nov 2016 (based on previous CXR's). He denies any history of asbestos exposure. Quantiferon Gold TB was checked in July 2022 and is negative. \par \par He has moderate restriction on PFTs today with mildly reduced diffusing capacity (after correcting for anemia). The restriction is likely due to pleural effusion + atelectasis + kyphosis. No parenchymal lung disease. I asked him to repeat PFTs in 6 months to assess for stability of pulmonary restriction.

## 2022-10-07 NOTE — HISTORY OF PRESENT ILLNESS
[TextBox_4] : Mr. Salvador is a 52 year-old male with a history of HTN, HLD, DM2, CAD s/p CABG, ischemic cardiomyopathy with chronic systolic heart failure s/p AICD, ESRD on dialysis, PVD s/p stents (mid and distal SFA, pop and PT trunk), s/p R foot hallux amputation, and left lower lobe rounded atelectasis with pleural effusion who presents for pulmonary evaluation prior to kidney transplant. Of note, he was recently hospitalized at [facility] in July for AVF malfunction. S/p permacath placement by IR. \par \par He denies any chest pain, cough, wheezing, or dyspnea at rest. Has mild dyspnea/fatigue with exertion, but denies limitation in his daily activities. No fevers, chills, sputum production. Has rounded atelectasis and chronic left pleural effusions since his CABG surgery Nov 2016.\par \par CT Chest (July 2022) with left subpleural mass within the left lower lobe measuring approximately 5.9 x 1.9 and by 4.9 cm with internal calcifications and consistent with round atelectasis. Additional areas of linear atelectasis and/or scarring in the left lung. Small loculated left pleural effusion. Parietal and visceral pleural calcifications noted. No thoracic lymphadenopathy. Bilateral atrophic kidneys. No hydronephrosis. Extensive atherosclerotic calcifications with calcification of the aorta, celiac axis, SMA, and bilateral renal arteries. Moderate atherosclerotic calcification in the bilateral common iliac artery. Dense calcifications in the bilateral internal iliac arteries. Minimal calcifications in the bilateral external iliac arteries.\par \par PFTs (Oct 2022) with moderate restriction, low lung volumes, and mildly reduced diffusing capacity (corrected DLCO after adjusting for his anemia with HGB 9.5 is 62%).\par \par During exam, patient received call from home that mother was sick. He left urgently, but I checked on him later in the day. Mother is stable, although chronically sick. He will schedule follow-up soon.

## 2022-10-07 NOTE — PHYSICAL EXAM
[No Acute Distress] : no acute distress [Well Nourished] : well nourished [Well Developed] : well developed [Normal Oropharynx] : normal oropharynx [Normal Appearance] : normal appearance [Supple] : supple [No Neck Mass] : no neck mass [No JVD] : no jvd [Normal Rate/Rhythm] : normal rate/rhythm [Normal Pulses] : normal pulses [Normal S1, S2] : normal s1, s2 [No Murmurs] : no murmurs [No Resp Distress] : no resp distress [No Acc Muscle Use] : no acc muscle use [Clear to Auscultation Bilaterally] : clear to auscultation bilaterally [Kyphosis] : kyphosis [Benign] : benign [Soft] : soft [No HSM] : no hsm [Normal Gait] : normal gait [No Clubbing] : no clubbing [No Cyanosis] : no cyanosis [No Edema] : no edema [FROM] : FROM [Normal Color/ Pigmentation] : normal color/ pigmentation [No Focal Deficits] : no focal deficits [Cranial Nerves Intact] : cranial nerves intact [No Motor Deficits] : no motor deficits [Oriented x3] : oriented x3 [Normal Affect] : normal affect [TextBox_105] : CEDRICK JENNINGS

## 2022-10-07 NOTE — CONSULT LETTER
[Dear  ___] : Dear  [unfilled], [Consult Letter:] : I had the pleasure of evaluating your patient, [unfilled]. [Please see my note below.] : Please see my note below. [Consult Closing:] : Thank you very much for allowing me to participate in the care of this patient.  If you have any questions, please do not hesitate to contact me. [Sincerely,] : Sincerely, [DrLenin  ___] : Dr. ALMANZA [FreeTextEntry2] : Dr. Joanne Corado MD [FreeTextEntry3] : Kenton Hackett MD\par Attending Physician in Pulmonary & Critical Care Medicine\par  of Medicine\par [employer] [facility]

## 2022-11-03 ENCOUNTER — NON-APPOINTMENT (OUTPATIENT)
Age: 52
End: 2022-11-03

## 2022-12-19 ENCOUNTER — RESULT REVIEW (OUTPATIENT)
Age: 52
End: 2022-12-19

## 2022-12-20 ENCOUNTER — APPOINTMENT (OUTPATIENT)
Dept: GASTROENTEROLOGY | Facility: AMBULATORY SURGERY CENTER | Age: 52
End: 2022-12-20

## 2022-12-20 DIAGNOSIS — Z12.11 ENCOUNTER FOR SCREENING FOR MALIGNANT NEOPLASM OF COLON: ICD-10-CM

## 2022-12-20 PROCEDURE — 45385 COLONOSCOPY W/LESION REMOVAL: CPT

## 2023-01-17 ENCOUNTER — NON-APPOINTMENT (OUTPATIENT)
Age: 53
End: 2023-01-17

## 2023-02-04 NOTE — PRE-OP CHECKLIST - SELECT TESTS ORDERED
Results in MD note
131/BMP/CBC/PT/PTT/INR/Type and Screen/POCT Blood Glucose/COVID-19
oxygen/IV pump

## 2023-02-17 ENCOUNTER — NON-APPOINTMENT (OUTPATIENT)
Age: 53
End: 2023-02-17

## 2023-04-04 ENCOUNTER — APPOINTMENT (OUTPATIENT)
Dept: CT IMAGING | Facility: IMAGING CENTER | Age: 53
End: 2023-04-04

## 2023-04-06 NOTE — PROGRESS NOTE ADULT - SUBJECTIVE AND OBJECTIVE BOX
Podiatry pager #: [phone] ([place])/ [phone] ([facility])    Patient is a 51y old  Male who presents with a chief complaint of R 2nd toe wound (21 Apr 2022 18:11)       INTERVAL HPI/OVERNIGHT EVENTS:  Patient seen and evaluated at bedside.  Pt is resting comfortable in NAD. Denies N/V/F/C.     Allergies    No Known Allergies    Intolerances        Vital Signs Last 24 Hrs  T(C): 36.6 (23 Apr 2022 04:46), Max: 37.1 (22 Apr 2022 13:53)  T(F): 97.9 (23 Apr 2022 04:46), Max: 98.8 (22 Apr 2022 13:53)  HR: 85 (23 Apr 2022 04:46) (85 - 93)  BP: 101/59 (23 Apr 2022 04:46) (101/59 - 122/68)  BP(mean): --  RR: 18 (23 Apr 2022 04:46) (18 - 18)  SpO2: 97% (23 Apr 2022 04:46) (97% - 97%)    LABS:                        9.4    5.92  )-----------( 199      ( 23 Apr 2022 07:15 )             30.0     04-23    138  |  96  |  59<H>  ----------------------------<  97  4.7   |  22  |  7.12<H>    Ca    9.4      23 Apr 2022 07:05    TPro  7.3  /  Alb  3.6  /  TBili  0.3  /  DBili  x   /  AST  10  /  ALT  10  /  AlkPhos  92  04-22    PT/INR - ( 21 Apr 2022 10:53 )   PT: 13.7 sec;   INR: 1.18 ratio         PTT - ( 21 Apr 2022 10:53 )  PTT:31.8 sec    CAPILLARY BLOOD GLUCOSE      POCT Blood Glucose.: 108 mg/dL (23 Apr 2022 08:14)  POCT Blood Glucose.: 109 mg/dL (22 Apr 2022 21:42)  POCT Blood Glucose.: 138 mg/dL (22 Apr 2022 17:27)  POCT Blood Glucose.: 133 mg/dL (22 Apr 2022 13:13)      Lower Extremity Physical Exam:  Vascular: DP/PT 0/4 B/L, CFT <3 seconds B/L, Temperature gradient warm to cool b/l  Neuro: Epicritic sensation absent to the level of digits B/L  Musculoskeletal/Ortho: s/p right foot partial 1st ray resection closed   Derm: Right foot distal 2nd digit wound w/ bone exposed, necrotic tissue bed, malodorous, no purulence, no tracking proximal    RADIOLOGY & ADDITIONAL TESTS:   START taking these medications      ibuprofen 800 MG tablet  Commonly known as: ADVIL;MOTRIN  Take 1 tablet by mouth every 8 hours as needed for Pain     oxyCODONE 5 MG immediate release tablet  Commonly known as: ROXICODONE  Take 1 tablet by mouth every 4 hours as needed for Pain for up to 5 days. Max Daily Amount: 30 mg            CONTINUE taking these medications      blood glucose monitor kit and supplies  Dispense 1 monitor for daily monitoring of blood glucose; supply brand as covered by insurance. famotidine 20 MG tablet  Commonly known as: PEPCID  Take 1 tablet by mouth 2 times daily     Lancets Misc  Test 5 times a day; Dispense sufficient amount as indicated for testing plus brand as covered by insurance.      PRENATAL PO            STOP taking these medications      promethazine 25 MG tablet  Commonly known as: PHENERGAN               Where to Get Your Medications        These medications were sent to [facility]  [address] [phone]  [address], [address] [facility] [address]      Phone: [phone]   ibuprofen 800 MG tablet  oxyCODONE 5 MG immediate release tablet         * Follow-up Care/Patient Instructions:    Dennis Reyes MD  [facility] [address]  [phone]    Follow up in 2 week(s)  Routine Post-op Check       Activity: no lifting, Driving, or Strenuous exercise for at least 2-3 weeks and no sex for at least 6 weeks  Diet: regular diet  Wound Care: keep wound clean and dry Not applicable as gestational age is greater than or equal to 34 weeks.

## 2023-04-11 ENCOUNTER — APPOINTMENT (OUTPATIENT)
Dept: VASCULAR SURGERY | Facility: CLINIC | Age: 53
End: 2023-04-11
Payer: MEDICARE

## 2023-04-11 VITALS
TEMPERATURE: 97.7 F | DIASTOLIC BLOOD PRESSURE: 71 MMHG | SYSTOLIC BLOOD PRESSURE: 126 MMHG | WEIGHT: 210 LBS | HEIGHT: 68 IN | BODY MASS INDEX: 31.83 KG/M2 | HEART RATE: 76 BPM

## 2023-04-11 PROCEDURE — 99214 OFFICE O/P EST MOD 30 MIN: CPT

## 2023-04-11 PROCEDURE — 93978 VASCULAR STUDY: CPT

## 2023-04-11 PROCEDURE — 93923 UPR/LXTR ART STDY 3+ LVLS: CPT

## 2023-04-11 NOTE — HISTORY OF PRESENT ILLNESS
[FreeTextEntry1] : 50 y/o m w/ multiple comorbidities including but not limited to CHF, AICD, CAD, CABG, DM and ESRD on HD via LUE AVF (by Dr Malone in 2017) seen in hospital for right foot hallux OM. He is s/p right leg angio w/ Dr Malone on 1/14/22 and stents were placed to the mid and distal SFA, Pop and PT Trunk. Then underwent right foot hallux resection w/ Dr Myers on 1/17/22. He denies any new complaints. Denies claudication or rest pain. Denies fever or chills. He completed course of ABx. He is taking ASA 81, Statin, Plavix and Pletal 50mg BID.  [de-identified] : pt is on pletal 50 bid\par pt  needs vasc cl for renal transplant

## 2023-04-11 NOTE — PHYSICAL EXAM
[1+] : left 1+ [Alert] : alert [Oriented to Person] : oriented to person [Oriented to Place] : oriented to place [Oriented to Time] : oriented to time [Calm] : calm [2+] : left 2+ [Right Carotid Bruit] : right carotid bruit heard [Left Carotid Bruit] : left carotid bruit heard [de-identified] : nad [de-identified] : wnl [de-identified] : no resp distress [FreeTextEntry1] : Difficult to appreciate distal pulses\par Bilateral lower extremities appear warm and perfused w/ moderate arterial insufficiency and moderate trophic skin changes.\par Right foot hallux incision healing, no s/s of infection. LLE no open wounds\par LUE AVF good bruit and thrill  [de-identified] : soft, NT,ND, No palpable mass [de-identified] : VALDEMARL [de-identified] : Luis Cranial nerves 2-12 luis grossly intact [de-identified] : cooperative

## 2023-04-11 NOTE — DATA REVIEWED
[FreeTextEntry1] : 2/17/2022 JAN: Right JAN 1.22 Left JAN 0.71 consistent w/ moderate arterial disease on the left\par \par 4/11/2023 JAN/PVR RLE mod infra geniculate and LLE mod  infra geniculate arterial insuff \par                                   w vessel calcification\par                                  Rt JAN  nc Lt JAN  NC\par                              \par 4/11/2023 Aorto Iliac Duplex  AA patent,   patent callie iliac arterial system \par                               \par

## 2023-04-11 NOTE — REASON FOR VISIT
[Follow-Up: _____] : a [unfilled] follow-up visit [FreeTextEntry1] : i need vascular clearance for renal transplant

## 2023-04-11 NOTE — ASSESSMENT
[Arterial/Venous Disease] : arterial/venous disease [Medication Management] : medication management [Foot care/Footwear] : foot care/footwear [FreeTextEntry1] : Impression arterial insufficiency stable s/p right leg stents x 4, ESRD on HD LUE AVF working well \par \par Medical Conservative Management skin/foot protection measures, walking/exercise as tolerated\par Continue ASA, Pletal and Plavix\par Pt cleared by cards for Pletal, spoke w/ Dr Braga \par f/u w/ pods Dr Myers \par rto next avail  for carotid duplex s/o stenosis  to complete vascular clearance for  renal transplant \par ov w natalia/pvr s/o art insuff 12 mo april 2024

## 2023-04-18 ENCOUNTER — APPOINTMENT (OUTPATIENT)
Dept: VASCULAR SURGERY | Facility: CLINIC | Age: 53
End: 2023-04-18
Payer: MEDICARE

## 2023-04-18 PROCEDURE — 93880 EXTRACRANIAL BILAT STUDY: CPT

## 2023-05-03 ENCOUNTER — APPOINTMENT (OUTPATIENT)
Dept: VASCULAR SURGERY | Facility: CLINIC | Age: 53
End: 2023-05-03
Payer: MEDICARE

## 2023-05-03 DIAGNOSIS — I65.23 OCCLUSION AND STENOSIS OF BILATERAL CAROTID ARTERIES: ICD-10-CM

## 2023-05-03 PROCEDURE — 99441: CPT | Mod: 95

## 2023-05-03 NOTE — PHYSICAL EXAM
[Alert] : alert [Oriented to Person] : oriented to person [Oriented to Place] : oriented to place [Oriented to Time] : oriented to time [Calm] : calm [de-identified] : no resp distress [FreeTextEntry1] : Physical exam findings via telephonic review with patient \par \par  [de-identified] : cooperative

## 2023-05-03 NOTE — ASSESSMENT
[Arterial/Venous Disease] : arterial/venous disease [Medication Management] : medication management [Foot care/Footwear] : foot care/footwear [FreeTextEntry1] : Impression arterial insufficiency stable s/p right leg stents x 4, ESRD on HD LUE AVF working well \par carotid stenosis stable \par \par Medical Conservative Management skin/foot protection measures, walking/exercise as tolerated\par Continue ASA, Pletal and Plavix\par Pt cleared by cards for Pletal, spoke w/ Dr Braga \par f/u w/ pods Dr Myers \par pt is cleared from vasc surg standpoint for renal transplant\par may d/c pletal  pre transplant and restart post surgery as per transplant service \par ov w natalia/pvr s/o art insuff 12 mo april 2024\par Telephonic visit  time duration 7 min\par \par

## 2023-05-03 NOTE — DATA REVIEWED
[FreeTextEntry1] : 2/17/2022 JAN: Right JAN 1.22 Left JAN 0.71 consistent w/ moderate arterial disease on the left\par \par 4/11/2023 JAN/PVR RLE mod infra geniculate and LLE mod  infra geniculate arterial insuff \par                                   w vessel calcification\par                                  Rt JAN  nc Lt JAN  NC\par                              \par 4/11/2023 Aorto Iliac Duplex  AA patent,   patent luis iliac arterial system \par                               \par 4/18/2023 Carotid Duplex  Rt ICA  less 50% stenosis  by velocity criteria\par                          Lt  ICA  less 50% stenosis  by velocity criteria\par                          Luis Ant Vertebral Arterial Flow \par \par

## 2023-05-03 NOTE — HISTORY OF PRESENT ILLNESS
[FreeTextEntry1] : 50 y/o m w/ multiple comorbidities including but not limited to CHF, AICD, CAD, CABG, DM and ESRD on HD via LUE AVF (by Dr Malone in 2017) seen in hospital for right foot hallux OM. He is s/p right leg angio w/ Dr Malone on 1/14/22 and stents were placed to the mid and distal SFA, Pop and PT Trunk. Then underwent right foot hallux resection w/ Dr Myers on 1/17/22. He denies any new complaints. Denies claudication or rest pain. Denies fever or chills. He completed course of ABx. He is taking ASA 81, Statin, Plavix and Pletal 50mg BID.  [de-identified] : pt is on pletal 50 bid\par pt  needs vasc cl for renal transplant \par pt states no cerebrovasc or   le sx

## 2023-05-09 NOTE — PROGRESS NOTE ADULT - ASSESSMENT
50yo M w/ h/o DM, CAD s/p CABG, h/o chronic systolic CHF but latest outptn EF improved to 50% here its 40% on this admission,  ESRD on HD, DIabetic neuropathy, doming in w diabetic foot ulcer with OM of right foot hallux wound to bone  - pt seen and evaluated by podiatry and vascular  -  ESR elevated   - Right foot hallux nailbed wound probing to bone, erythema to MPJ, dactylitis of hallux, no purulence, no malodor, no tracking, no fluctuance   - Right foot wound culture taken by podiatry. but not resulted in sunrise, blood Cx NTD. ID following, on Zosyn, s/p Vanco  - Pt seen by vascular as well:  Dr. Malone at bedside, s/p RLE angio via L CFA access, stenting of mid and distal SFA as well as popliteal and PT trunk, with mynx closure. placed on Pletal, Plavix, off ASA  - covid positive on 1/16  - POD2, post partial foot amp by podiatry   - HD as per renal  - CAD/ systolic CHF stable, card following, on BB, ACE-I, on pletal and Plavix, taken off ASA  - TTE reviewed EF 40%  - DM, on Insulin on a sliding scale, numbers are stable  - dvt ppx w [facility]  - cont outptn meds  - dc home on 10 days po Augmentin w outptn F/U             See Flowsheet for immunotherapy administration. Patient waited in clinic 30 min for observation. Pt had epi pen on hand.

## 2023-05-16 ENCOUNTER — OUTPATIENT (OUTPATIENT)
Dept: OUTPATIENT SERVICES | Facility: HOSPITAL | Age: 53
LOS: 1 days | End: 2023-05-16
Payer: MEDICARE

## 2023-05-16 ENCOUNTER — APPOINTMENT (OUTPATIENT)
Dept: CT IMAGING | Facility: IMAGING CENTER | Age: 53
End: 2023-05-16
Payer: MEDICARE

## 2023-05-16 DIAGNOSIS — Z01.818 ENCOUNTER FOR OTHER PREPROCEDURAL EXAMINATION: ICD-10-CM

## 2023-05-16 DIAGNOSIS — Z95.810 PRESENCE OF AUTOMATIC (IMPLANTABLE) CARDIAC DEFIBRILLATOR: Chronic | ICD-10-CM

## 2023-05-16 DIAGNOSIS — Z95.1 PRESENCE OF AORTOCORONARY BYPASS GRAFT: Chronic | ICD-10-CM

## 2023-05-16 DIAGNOSIS — Z89.422 ACQUIRED ABSENCE OF OTHER LEFT TOE(S): Chronic | ICD-10-CM

## 2023-05-16 PROCEDURE — 71250 CT THORAX DX C-: CPT

## 2023-05-16 PROCEDURE — 71250 CT THORAX DX C-: CPT | Mod: 26,MH

## 2023-05-23 ENCOUNTER — NON-APPOINTMENT (OUTPATIENT)
Age: 53
End: 2023-05-23

## 2023-05-25 ENCOUNTER — APPOINTMENT (OUTPATIENT)
Dept: PULMONOLOGY | Facility: CLINIC | Age: 53
End: 2023-05-25
Payer: MEDICARE

## 2023-05-25 ENCOUNTER — NON-APPOINTMENT (OUTPATIENT)
Age: 53
End: 2023-05-25

## 2023-05-25 VITALS
DIASTOLIC BLOOD PRESSURE: 73 MMHG | SYSTOLIC BLOOD PRESSURE: 129 MMHG | HEIGHT: 68 IN | HEART RATE: 76 BPM | WEIGHT: 210 LBS | OXYGEN SATURATION: 100 % | BODY MASS INDEX: 31.83 KG/M2

## 2023-05-25 DIAGNOSIS — N18.6 END STAGE RENAL DISEASE: ICD-10-CM

## 2023-05-25 DIAGNOSIS — J98.4 OTHER DISORDERS OF LUNG: ICD-10-CM

## 2023-05-25 DIAGNOSIS — J90 PLEURAL EFFUSION, NOT ELSEWHERE CLASSIFIED: ICD-10-CM

## 2023-05-25 DIAGNOSIS — Z99.2 END STAGE RENAL DISEASE: ICD-10-CM

## 2023-05-25 PROCEDURE — 99215 OFFICE O/P EST HI 40 MIN: CPT | Mod: 25

## 2023-05-25 PROCEDURE — 94729 DIFFUSING CAPACITY: CPT

## 2023-05-25 PROCEDURE — ZZZZZ: CPT

## 2023-05-25 PROCEDURE — 94726 PLETHYSMOGRAPHY LUNG VOLUMES: CPT

## 2023-05-25 PROCEDURE — 94010 BREATHING CAPACITY TEST: CPT

## 2023-05-30 PROBLEM — J90 PLEURAL EFFUSION, LEFT: Status: ACTIVE | Noted: 2022-10-06

## 2023-05-30 PROBLEM — J98.4 RESTRICTIVE LUNG DISEASE: Status: ACTIVE | Noted: 2022-10-07

## 2023-05-30 PROBLEM — N18.6 ESRD ON HEMODIALYSIS: Status: ACTIVE | Noted: 2022-02-17

## 2023-05-30 NOTE — HISTORY OF PRESENT ILLNESS
[TextBox_4] : Mr. Salvador is a 52 year-old male with a history of HTN, HLD, DM2, CAD s/p CABG, ischemic cardiomyopathy with chronic systolic heart failure s/p AICD, ESRD on dialysis, PVD s/p stents (mid and distal SFA, pop and PT trunk), s/p R foot hallux amputation, and left lower lobe rounded atelectasis with pleural effusion who presents for pulmonary evaluation prior to kidney transplant. \par \par He denies any chest pain, cough, wheezing, or dyspnea at rest or with regular exertion. He can develop dyspnea after going up 2-3 flights of stairs. He has no limitation in his daily activities. No fevers, chills, sputum production. Has rounded atelectasis and chronic left pleural effusions since his CABG surgery Nov 2016. Otherwise he reports doing well. Unfortunately his mother passed away recently, but he is coping well.\par \par CT Chest (May 2023) has not been read by radiology yet. My preliminary read shows that the subpleural mass within the left lower lobe is stable in size and contains internal calcifications with appearance consistent with rounded atelectasis. Additional areas of linear atelectasis and/or scarring in the AVA. Small unchanged loculated left pleural effusion. No significant thoracic lymphadenopathy.\par \par PFTs (May 2023) with mild restriction, reduced TLC with normal RV, and moderately reduced diffusing capacity. However, note that the DLCO may be falsely low given that the IVC during DLCO maneuver is less than 85% of the SVC during body plethysmography.\par \par Up to date with influenza, pneumococcal, and COVID-19 vaccinations.

## 2023-05-30 NOTE — ASSESSMENT
[FreeTextEntry1] : Mr. Salvador is a 52 year-old male with a history of HTN, HLD, DM2, CAD s/p CABG, ischemic cardiomyopathy with chronic systolic heart failure s/p AICD, ESRD on dialysis, PVD s/p stents (mid and distal SFA, pop and PT trunk), s/p R foot hallux amputation, and left lower lobe rounded atelectasis with pleural effusion who presents for pulmonary evaluation prior to kidney transplant. He denies any chest pain, cough, wheezing, or dyspnea at rest or with regular exertion. He can develop dyspnea after going up 2-3 flights of stairs. He has no limitation in his daily activities. No fevers, chills, sputum production. He has rounded atelectasis and chronic left pleural effusions since his CABG surgery Nov 2016. \par \par CT Chest (May 2023) with small chronic loculated left pleural effusion with pleural thickening calcification with adjacent left lower lobe volume loss from rounded atelectasis. Additional areas of linear atelectasis and/or scarring in the AVA, unchanged. No significant thoracic lymphadenopathy.\par \par PFTs (May 2023) with mild restriction, reduced TLC with normal RV, and moderately reduced diffusing capacity. However, note that the DLCO may be falsely low given that the IVC during DLCO maneuver is less than 85% of the SVC during body plethysmography.\par \par He denies any history of asbestos exposure. Quantiferon Gold TB in July 2022 was negative. The chronic loculated small left pleural effusion with adjacent rounded atelectasis is likely related to prior sternotomy and CABG in 2016. \par \par He has mild restriction and low lung volumes on PFTs with moderately reduced diffusing capacity, although this may be underestimated. He is asymptomatic from a pulmonary perspective. The mild restriction and low lung volumes is likely due to his obesity as well as the left lower lobe rounded atelectasis. Given that he is asymptomatic and findings are stable, he does not require further workup or intervention at this time.\par \par There are no contraindications from a pulmonary perspective to proceed with renal transplant. Needs close cardiac and respiratory monitoring during and after surgery. Consider CPAP therapy post-procedure if he develops respiratory distress. No contraindications to proceeding with procedure from pulmonary perspective with above precautions. Please call pulmonary if any issues ([phone]).\par \par Up to date with influenza, pneumococcal, and COVID-19 vaccinations.

## 2023-05-31 ENCOUNTER — APPOINTMENT (OUTPATIENT)
Dept: NEPHROLOGY | Facility: CLINIC | Age: 53
End: 2023-05-31
Payer: COMMERCIAL

## 2023-05-31 PROCEDURE — 99215 OFFICE O/P EST HI 40 MIN: CPT

## 2023-06-01 NOTE — PHYSICAL EXAM
[General Appearance - Alert] : alert [General Appearance - In No Acute Distress] : in no acute distress [General Appearance - Well Nourished] : well nourished [Sclera] : the sclera and conjunctiva were normal [Extraocular Movements] : extraocular movements were intact [Outer Ear] : the ears and nose were normal in appearance [Neck Appearance] : the appearance of the neck was normal [Neck Cervical Mass (___cm)] : no neck mass was observed [Respiration, Rhythm And Depth] : normal respiratory rhythm and effort [Exaggerated Use Of Accessory Muscles For Inspiration] : no accessory muscle use [Auscultation Breath Sounds / Voice Sounds] : lungs were clear to auscultation bilaterally [Heart Rate And Rhythm] : heart rate was normal and rhythm regular [Heart Sounds] : normal S1 and S2 [Edema] : there was no peripheral edema [Bowel Sounds] : normal bowel sounds [Abdomen Soft] : soft [Abdomen Tenderness] : non-tender [Cervical Lymph Nodes Enlarged Posterior Bilaterally] : posterior cervical [Cervical Lymph Nodes Enlarged Anterior Bilaterally] : anterior cervical [Supraclavicular Lymph Nodes Enlarged Bilaterally] : supraclavicular [Musculoskeletal - Swelling] : no joint swelling seen [Skin Color & Pigmentation] : normal skin color and pigmentation [Skin Turgor] : normal skin turgor [] : no rash [Oriented To Time, Place, And Person] : oriented to person, place, and time [Impaired Insight] : insight and judgment were intact [Affect] : the affect was normal [FreeTextEntry1] : walks stooped with limp.  Right foot amputation in boot.

## 2023-06-01 NOTE — ASSESSMENT
[FreeTextEntry1] : 1. CAD s/p CABG in 2016, s/p AICD in 2017 - seen and cleared by cardiology in June 2022. stress test is from August 2022 and was wnl. Echo: 1/14/2022 moderate global left ventricular systolic dysfunction, EF 40%. Normal valves and normal right heart. \par would obtain a repeat echo and f/u with cardiology. \par \par 2.CT Chest (May 2023) with small chronic loculated left pleural effusion with pleural thickening calcification with adjacent left lower lobe volume loss from rounded atelectasis. Additional areas of linear atelectasis and/or scarring in the AVA, unchanged. No significant thoracic lymphadenopathy.\par \par PFTs (May 2023) with mild restriction, reduced TLC with normal RV, and moderately reduced diffusing capacity. However, note that the DLCO may be falsely low given that the IVC during DLCO maneuver is less than 85% of the SVC during body plethysmography.\par \par He was seen and cleared by pulmonary on May 25th , 2023. "There are no contraindications from a pulmonary perspective to proceed with renal transplant. Needs close cardiac and respiratory monitoring during and after surgery. Consider CPAP therapy post-procedure if he develops respiratory distress. No contraindications to proceeding with procedure from pulmonary perspective with above precautions."\par \par \par 3. Arterial insufficiency stable s/p right leg stents x 4- Seen and cleared by Vascular\par \par

## 2023-06-01 NOTE — PLAN
[FreeTextEntry1] : 1.  ESRD on dialysis - Mr. Salvador is  an increased risk recipient given his comorbidities .  Though young he has several macrovascular complications of diabetes and ESRD including PVD, CAD s/p CABG and right hallux and right second toe partial resection.  He has no living donors but does have 5 years of dialysis time.  \par 2.  CAD s/p CABG, and has an AICD - seen and cleared by cardiology in June 2022. stress test is from August 2022 and was wnl. Echo is from Jan 2022 , EF was 40%. would obtain a repeat echo and f/u with cardiology. \par 3. Arterial insufficiency stable s/p right leg stents x 4- Seen and cleared by Vascular. On Plavix , aspirin and pletal.\par "may d/c pletal pre transplant and restart post surgery as per transplant service"  \par 4.CT Chest (May 2023) with small chronic loculated left pleural effusion with pleural thickening calcification with adjacent left lower lobe volume loss from rounded atelectasis. Additional areas of linear atelectasis and/or scarring in the AVA, unchanged. No significant thoracic lymphadenopathy.\par PFTs (May 2023) with mild restriction, reduced TLC with normal RV, and moderately reduced diffusing capacity. However, note that the DLCO may be falsely low given that the IVC during DLCO maneuver is less than 85% of the SVC during body plethysmography. \par He was seen and cleared by pulmonary on May 25th , 2023. "There are no contraindications from a pulmonary perspective to proceed with renal transplant. Needs close cardiac and respiratory monitoring during and after surgery. Consider CPAP therapy post-procedure if he develops respiratory distress. No contraindications to proceeding with procedure from pulmonary perspective with above precautions."\par \par \par \par \par \par \par I have personally discussed the risks and benefits of transplantation and patient attended transplant education class where the following was disclosed:\par  \par Reviewed factors affecting survival and morbidity while on dialysis, the transplant wait list and reviewed carmen-operative and long-term risk factors affecting outcome in kidney transplantation.  \par  \par One year SRTR outcomes for national and [facility] were discussed in regards to patient survival and graft survival after transplantation.  \par  \par Details of transplant surgery, including complications were discussed.\par Immunosuppression and complications including infection including life threatening sepsis and opportunistic infections, malignancy and new onset diabetes were discussed.  \par  \par Benefits of live donor transplantation as well as variability in wait times across regions and multiple listing were discussed. \par KDPI >85% and PHS high risk criteria donors were discussed. \par HCV kidney transplantation was discussed.\par  \par Will proceed with completing/ updating work up and listing for transplant/ live donor transplant once work up is reviewed and found to be acceptable by multidisciplinary listing committee.\par \par

## 2023-06-01 NOTE — REVIEW OF SYSTEMS
[Joint Pain] : joint pain [Negative] : Heme/Lymph [FreeTextEntry5] : stents in right leg [FreeTextEntry8] : does not urinate [FreeTextEntry9] : chronic back pain. right foot amputation.  [de-identified] : neuropathy of feet

## 2023-06-01 NOTE — HISTORY OF PRESENT ILLNESS
[Diabetes Mellitus] : Diabetes Mellitus [TextBox_42] : 52 year old  male with ESRD on HD since who presents for follow prior to kidney transplant listing \par \par Last seen 6/20/2022 \par INTERVAL EVENTS - none \par No change in baseline functional status- Independent for ADLs, ambulates independently, can walk 3-4 blocks without stopping.Drives himself to dialysis. lives alone, has his sister nearby if he needs any help. \par \par \par His nephrologist is Dr. Bharath Romo.  \par HD via left forearm avf TTS since 2016.  Developed ESRD about 6 months after CABGx4 (surgery in [facility]).  \par He has minimal urine output.  \par ESRD is secondary to diabetes.  Pt has had diabetes diagnosed in 2016 but was told he had it much longer.  He is currently on Januvia, never insulin.  He has mild neuropathy.  Denies retinopathy.  He had a right hallux resection - osteomyelitis 1/17/22.  He has PVD and is s/p 4 stents in right leg: mid and distal right superficial femoral artery, right popliteal x2 on  1/14/22.\par covid infection - mild infection, vaccinated\par Passed a kidney stone once. \par No cancer history.  \par He has never had a transplant evaluation.  \par \par PMHx\par cardiac - CHF, CABGx4 and ICD 2016 - no chest pain\par Echo 1/14/22 - EF 40%, normal pulmonary pressures\par \par PSH\par PVD - 1/14/22 - 4 stents in right leg: mid and distal right superficial femoral artery, right popliteal x2\par Right Hallux resection - osteomyelitis 1/17/22.  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  \par \par Medications reviewed\par includes plavix and aspirin\par \par SHx\par lives with sister (suffers from MS) and mother (79yo)\par self-caring, does own cooking, cleaning\par drives\par ex-smoker stopped 5y ago\par stopped etoh 5y ago\par \par \par

## 2023-06-22 ENCOUNTER — NON-APPOINTMENT (OUTPATIENT)
Age: 53
End: 2023-06-22

## 2023-07-05 NOTE — DIETITIAN INITIAL EVALUATION ADULT. - NS AS NUTRI INTERV MEALS SNACK
Encouraged pt to increase po intake of meals as tolerated and discussed importance of adequate nutrition intake. Reviewed alternate menu options and menu ordering procedure. Provide food preferences within therapeutic diet when requested. Estlander Flap (Upper To Lower Lip) Text: The defect of the lower lip was assessed and measured.  Given the location and size of the defect, an Estlander flap was deemed most appropriate. Using a sterile surgical marker, an appropriate Estlander flap was measured and drawn on the upper lip. Local anesthesia was then infiltrated. A scalpel was then used to incise the lateral aspect of the flap, through skin, muscle and mucosa, leaving the flap pedicled medially.  The flap was then rotated and positioned to fill the lower lip defect.  The flap was then sutured into place with a three layer technique, closing the orbicularis oris muscle layer with subcutaneous buried sutures, followed by a mucosal layer and an epidermal layer.

## 2023-07-09 ENCOUNTER — NON-APPOINTMENT (OUTPATIENT)
Age: 53
End: 2023-07-09

## 2023-07-10 ENCOUNTER — APPOINTMENT (OUTPATIENT)
Dept: TRANSPLANT | Facility: CLINIC | Age: 53
End: 2023-07-10

## 2023-07-10 DIAGNOSIS — Z01.818 ENCOUNTER FOR OTHER PREPROCEDURAL EXAMINATION: ICD-10-CM

## 2023-07-15 ENCOUNTER — APPOINTMENT (OUTPATIENT)
Dept: TRANSPLANT | Facility: HOSPITAL | Age: 53
End: 2023-07-15

## 2023-07-15 ENCOUNTER — TRANSCRIPTION ENCOUNTER (OUTPATIENT)
Age: 53
End: 2023-07-15

## 2023-07-15 ENCOUNTER — INPATIENT (INPATIENT)
Facility: HOSPITAL | Age: 53
LOS: 12 days | Discharge: HOME HEALTH | DRG: 650 | End: 2023-07-28
Attending: SURGERY | Admitting: SURGERY
Payer: MEDICARE

## 2023-07-15 VITALS
TEMPERATURE: 99 F | OXYGEN SATURATION: 99 % | HEART RATE: 70 BPM | DIASTOLIC BLOOD PRESSURE: 75 MMHG | SYSTOLIC BLOOD PRESSURE: 172 MMHG | WEIGHT: 212.97 LBS | RESPIRATION RATE: 18 BRPM | HEIGHT: 69 IN

## 2023-07-15 DIAGNOSIS — Z95.1 PRESENCE OF AORTOCORONARY BYPASS GRAFT: Chronic | ICD-10-CM

## 2023-07-15 DIAGNOSIS — Z94.0 KIDNEY TRANSPLANT STATUS: ICD-10-CM

## 2023-07-15 DIAGNOSIS — Z89.422 ACQUIRED ABSENCE OF OTHER LEFT TOE(S): Chronic | ICD-10-CM

## 2023-07-15 DIAGNOSIS — Z95.810 PRESENCE OF AUTOMATIC (IMPLANTABLE) CARDIAC DEFIBRILLATOR: Chronic | ICD-10-CM

## 2023-07-15 LAB
ALBUMIN SERPL ELPH-MCNC: 4.3 G/DL — SIGNIFICANT CHANGE UP (ref 3.3–5)
ALP SERPL-CCNC: 69 U/L — SIGNIFICANT CHANGE UP (ref 40–120)
ALT FLD-CCNC: 9 U/L — LOW (ref 10–45)
ANION GAP SERPL CALC-SCNC: 18 MMOL/L — HIGH (ref 5–17)
APTT BLD: 34.8 SEC — SIGNIFICANT CHANGE UP (ref 27.5–35.5)
AST SERPL-CCNC: 15 U/L — SIGNIFICANT CHANGE UP (ref 10–40)
BASE EXCESS BLDV CALC-SCNC: 5.8 MMOL/L — HIGH (ref -2–3)
BASOPHILS # BLD AUTO: 0.02 K/UL — SIGNIFICANT CHANGE UP (ref 0–0.2)
BASOPHILS NFR BLD AUTO: 0.3 % — SIGNIFICANT CHANGE UP (ref 0–2)
BILIRUB SERPL-MCNC: 0.3 MG/DL — SIGNIFICANT CHANGE UP (ref 0.2–1.2)
BLD GP AB SCN SERPL QL: NEGATIVE — SIGNIFICANT CHANGE UP
BUN SERPL-MCNC: 46 MG/DL — HIGH (ref 7–23)
CA-I SERPL-SCNC: 1.22 MMOL/L — SIGNIFICANT CHANGE UP (ref 1.15–1.33)
CALCIUM SERPL-MCNC: 10.2 MG/DL — SIGNIFICANT CHANGE UP (ref 8.4–10.5)
CHLORIDE BLDV-SCNC: 97 MMOL/L — SIGNIFICANT CHANGE UP (ref 96–108)
CHLORIDE SERPL-SCNC: 94 MMOL/L — LOW (ref 96–108)
CO2 BLDV-SCNC: 33 MMOL/L — HIGH (ref 22–26)
CO2 SERPL-SCNC: 28 MMOL/L — SIGNIFICANT CHANGE UP (ref 22–31)
CREAT SERPL-MCNC: 7.14 MG/DL — HIGH (ref 0.5–1.3)
EGFR: 9 ML/MIN/1.73M2 — LOW
EOSINOPHIL # BLD AUTO: 0.18 K/UL — SIGNIFICANT CHANGE UP (ref 0–0.5)
EOSINOPHIL NFR BLD AUTO: 2.8 % — SIGNIFICANT CHANGE UP (ref 0–6)
GAS PNL BLDV: 131 MMOL/L — LOW (ref 136–145)
GAS PNL BLDV: SIGNIFICANT CHANGE UP
GLUCOSE BLDC GLUCOMTR-MCNC: 108 MG/DL — HIGH (ref 70–99)
GLUCOSE BLDV-MCNC: 97 MG/DL — SIGNIFICANT CHANGE UP (ref 70–99)
GLUCOSE SERPL-MCNC: 103 MG/DL — HIGH (ref 70–99)
HBV SURFACE AG SER-ACNC: SIGNIFICANT CHANGE UP
HCO3 BLDV-SCNC: 31 MMOL/L — HIGH (ref 22–29)
HCT VFR BLD CALC: 31.2 % — LOW (ref 39–50)
HCT VFR BLDA CALC: 31 % — LOW (ref 39–51)
HGB BLD CALC-MCNC: 10.4 G/DL — LOW (ref 12.6–17.4)
HGB BLD-MCNC: 10 G/DL — LOW (ref 13–17)
IMM GRANULOCYTES NFR BLD AUTO: 0.6 % — SIGNIFICANT CHANGE UP (ref 0–0.9)
INR BLD: 1.09 RATIO — SIGNIFICANT CHANGE UP (ref 0.88–1.16)
LACTATE BLDV-MCNC: 1.7 MMOL/L — SIGNIFICANT CHANGE UP (ref 0.5–2)
LYMPHOCYTES # BLD AUTO: 1 K/UL — SIGNIFICANT CHANGE UP (ref 1–3.3)
LYMPHOCYTES # BLD AUTO: 15.4 % — SIGNIFICANT CHANGE UP (ref 13–44)
MAGNESIUM SERPL-MCNC: 2.6 MG/DL — SIGNIFICANT CHANGE UP (ref 1.6–2.6)
MCHC RBC-ENTMCNC: 31.7 PG — SIGNIFICANT CHANGE UP (ref 27–34)
MCHC RBC-ENTMCNC: 32.1 GM/DL — SIGNIFICANT CHANGE UP (ref 32–36)
MCV RBC AUTO: 99 FL — SIGNIFICANT CHANGE UP (ref 80–100)
MONOCYTES # BLD AUTO: 0.84 K/UL — SIGNIFICANT CHANGE UP (ref 0–0.9)
MONOCYTES NFR BLD AUTO: 12.9 % — SIGNIFICANT CHANGE UP (ref 2–14)
NEUTROPHILS # BLD AUTO: 4.42 K/UL — SIGNIFICANT CHANGE UP (ref 1.8–7.4)
NEUTROPHILS NFR BLD AUTO: 68 % — SIGNIFICANT CHANGE UP (ref 43–77)
NRBC # BLD: 0 /100 WBCS — SIGNIFICANT CHANGE UP (ref 0–0)
PCO2 BLDV: 48 MMHG — SIGNIFICANT CHANGE UP (ref 42–55)
PH BLDV: 7.42 — SIGNIFICANT CHANGE UP (ref 7.32–7.43)
PHOSPHATE SERPL-MCNC: 5.5 MG/DL — HIGH (ref 2.5–4.5)
PLATELET # BLD AUTO: 213 K/UL — SIGNIFICANT CHANGE UP (ref 150–400)
PO2 BLDV: 27 MMHG — SIGNIFICANT CHANGE UP (ref 25–45)
POTASSIUM BLDV-SCNC: 6.2 MMOL/L — CRITICAL HIGH (ref 3.5–5.1)
POTASSIUM SERPL-MCNC: 4.8 MMOL/L — SIGNIFICANT CHANGE UP (ref 3.5–5.3)
POTASSIUM SERPL-SCNC: 4.8 MMOL/L — SIGNIFICANT CHANGE UP (ref 3.5–5.3)
PROT SERPL-MCNC: 7.4 G/DL — SIGNIFICANT CHANGE UP (ref 6–8.3)
PROTHROM AB SERPL-ACNC: 12.7 SEC — SIGNIFICANT CHANGE UP (ref 10.5–13.4)
RBC # BLD: 3.15 M/UL — LOW (ref 4.2–5.8)
RBC # FLD: 15.3 % — HIGH (ref 10.3–14.5)
RH IG SCN BLD-IMP: POSITIVE — SIGNIFICANT CHANGE UP
SAO2 % BLDV: 44.8 % — LOW (ref 67–88)
SARS-COV-2 RNA SPEC QL NAA+PROBE: SIGNIFICANT CHANGE UP
SODIUM SERPL-SCNC: 140 MMOL/L — SIGNIFICANT CHANGE UP (ref 135–145)
WBC # BLD: 6.5 K/UL — SIGNIFICANT CHANGE UP (ref 3.8–10.5)
WBC # FLD AUTO: 6.5 K/UL — SIGNIFICANT CHANGE UP (ref 3.8–10.5)

## 2023-07-15 PROCEDURE — 93010 ELECTROCARDIOGRAM REPORT: CPT

## 2023-07-15 PROCEDURE — 74018 RADEX ABDOMEN 1 VIEW: CPT | Mod: 26

## 2023-07-15 PROCEDURE — 71045 X-RAY EXAM CHEST 1 VIEW: CPT | Mod: 26

## 2023-07-15 PROCEDURE — 93289 INTERROG DEVICE EVAL HEART: CPT | Mod: 26,GC

## 2023-07-15 RX ORDER — BASILIXIMAB 20 MG/5ML
20 INJECTION, POWDER, FOR SOLUTION INTRAVENOUS ONCE
Refills: 0 | Status: DISCONTINUED | OUTPATIENT
Start: 2023-07-15 | End: 2023-07-16

## 2023-07-15 RX ORDER — CEFAZOLIN SODIUM 1 G
2000 VIAL (EA) INJECTION ONCE
Refills: 0 | Status: COMPLETED | OUTPATIENT
Start: 2023-07-15 | End: 2023-07-15

## 2023-07-15 NOTE — H&P ADULT - ASSESSMENT
This is a 52 yo M with hx of DM2 CAD, CHF, s/p CABG 2015, AICD, on hemodialysis for approximately 6 years via L AVF , S/P RT big toe and second toe amputation 2021, on asa and plavix for severe PVD (per pt). Now here for possible DDRT.      Plan:  Admit to transplant surgery  NPO  Labs including PHS and T&S  PIVL  OR meds: Ancef/medrol/simulect  Consents   This is a 54 yo M with hx of DM2 CAD, CHF, s/p CABG 2015, AICD, on hemodialysis for approximately 6 years via L AVF , S/P RT big toe and second toe amputation 2021, on asa and plavix for severe PVD (per pt). Now here for possible DDRT.      Plan:  -Admit to transplant surgery  -NPO  -Labs including PHS and T&S  -EP consulted for AICD interrogation   -OR meds: Ancef/medrol/simulect ordered  -Will obtain consent

## 2023-07-15 NOTE — PRE-ANESTHESIA EVALUATION ADULT - NSANTHPMHFT_GEN_ALL_CORE
54 y/o male; ESRD on hemodialysis x6 yrs---last 7/14/23 ; left AVF; CAD; CABG x4 2015; ischemic cardiomyopathy with improved function;  AICD 2016; PVD  on asa and plavix; s/p 4 stents right leg (mid and distal right superficial femoral artery, right popliteal x2 on 1/14/22); RT big toe and second toe amputation 2021; h/o right tunneled hemodialysis catheter 7/2022; chest CT may 2023: small chronic left pleural effusion  K 6.2 today....to be hemodialyzed prior to transplant 52 y/o male; ESRD on hemodialysis x6 yrs---last 7/14/23 ; left AVF; CAD; CABG x4 2015; ischemic cardiomyopathy with improved function;  AICD 2016; PVD  on asa and plavix; s/p 4 stents right leg (mid and distal right superficial femoral artery, right popliteal x2 on 1/14/22); RT big toe and second toe amputation 2021; h/o right tunneled hemodialysis catheter 7/2022; chest CT may 2023: small chronic left pleural effusion  K 4.8 today preop

## 2023-07-15 NOTE — H&P ADULT - HISTORY OF PRESENT ILLNESS
This is a 54 yo M with hx of DM2 CAD, CHF, s/p CABG 2015, AICD, on hemodialysis for approximately 6 years via L AVF , S/P RT big toe and second toe amputation 2021, on asa and plavix for severe PVD (per pt). Now here for possible DDRT. Pt reports dose not make urine only few drops occasionally. Pt denies N/V/D. Pt reports last ate at 4pm today and had HD yesterday 7/14/23.   54 yo M with hx of DM2 CAD, CHF, s/p CABG 2015, AICD (2016), on hemodialysis for approximately 6 years via L AVF (nephrologist Dr. Bharath Romo), He has hx of PVD, is s/p 4 stents in R leg (mid and distal right superficial femoral artery, right popliteal x2 on 1/14/22).; is sp RT big toe and second toe amputation 2021, on asa and plavix for severe PVD (per pt).     Now here for possible DDRT. Pt reports dose not make urine only few drops occasionally. Pt denies N/V/D, fevers/chills, CP, SOB. Pt reports last ate at 4pm today and had HD yesterday 7/14/23.     52 yo M with hx of DM2 CAD, CHF, s/p CABG 2015, AICD (2016), on hemodialysis for approximately 6 years via L AVF (nephrologist Dr. Bharath Romo), He has hx of PVD, is s/p 4 stents in R leg (mid and distal right superficial femoral artery, right popliteal x2 on 1/14/22).; is sp RT big toe and second toe amputation 2021, on asa and plavix for severe PVD (per pt).     Now here for possible DDRT. Pt reports dose not make urine only few drops occasionally. Pt denies N/V/D, fevers/chills, CP, SOB. Pt reports last ate at 4pm today and had HD yesterday 7/14/23.        Home meds:  -Atorvastatin 40mg QD  -[id number]  -Januvia 100mg QD  -Coreg 6.25 BID  -Lisinopril 2.5 QD  -Nanette-bridger 1 tab QD  -Plavix 75 mg QD

## 2023-07-15 NOTE — H&P ADULT - NSHPOUTPATIENTPROVIDERS_GEN_ALL_CORE
Dr Romo PCP/Renal. Cardiologist Dr. Braga  Transplant Renal [facility] [place], Podiatry Dr. Christy, Vascular Dr. Fallon

## 2023-07-15 NOTE — PROCEDURE NOTE - ADDITIONAL PROCEDURE DETAILS
In addition to above:  - device interrogation requested by primary team as pt is going to OR at midnight for kidney transplant  - Presenting rhythm: V-sense rates 80's. Pt is not dependent on pacemaker.  - Tachy therapy parameters: VF On 222bpm ATP during charging, 35J x6. VT On 188bpm Burst (2), 35J x5  - Events: since last session 4/22/2022, 2 AF events in 8/11/2022 longest lasting 1hr 16min, no events since 8/2022. No VT/VF episodes recorded.  - Overall device/leads working well. Discussed with primary team. Recommend placing magnet over device while in OR if surgery involving above umbilicus to prevent inappropriate tachyarrhythmia therapies.

## 2023-07-15 NOTE — H&P ADULT - NSHPLABSRESULTS_GEN_ALL_CORE
Vital Signs Last 24 Hrs  T(C): 37 (15 Jul 2023 18:32), Max: 37 (15 Jul 2023 18:32)  T(F): 98.6 (15 Jul 2023 18:32), Max: 98.6 (15 Jul 2023 18:32)  HR: 70 (15 Jul 2023 18:32) (70 - 70)  BP: 172/75 (15 Jul 2023 18:32) (172/75 - 172/75)  BP(mean): --  RR: 18 (15 Jul 2023 18:32) (18 - 18)  SpO2: 99% (15 Jul 2023 18:32) (99% - 99%)        I&O's Summary Labs pending

## 2023-07-15 NOTE — PRE-ANESTHESIA EVALUATION ADULT - NSRADCARDRESULTSFT_GEN_ALL_CORE
[place] has been awake, alert, and visible intermittently out in the milieu  Pt went out on deck with staff and peers for fresh air  Pt ate 100% supper  Pt denies any depression, anxiety, a/v hallucinations, and has not verbalized any delusions  Pleasant on approach, polite, and cooperative  Pt sits in dining room and walks around unit at intervals  Pt attended and participated in evening nursing group, wrap up group, and had evening snack with peers  Pt compliant with scheduled meds  Pt requested prn Artificial tears for eye dryness and 1 gtt to each eye at [id number]  Continue to monitor/assess for any changes  TTE 1/2022 : EF (Visual Estimate): 40 %  ------------------------------------------------------------------------  Observations:  Mitral Valve: Normal mitral valve. Mild-moderate mitral  regurgitation.  Aortic Valve/Aorta: Normal trileaflet aortic valve.  Aortic Root: 3 cm.  Left Atrium: Normal left atrium.  LA volume index = 28  cc/m2.  Left Ventricle: Endocardial visualization enhanced with  intravenous injection of echo contrast (Definity).  Despite  the use of echo contrast the visualization endocardium was  still suboptimal.  Moderate global left ventricular  systolic dysfunction. Normal left ventricular internal  dimensions and wall thicknesses. Mild diastolic dysfunction  (Stage I).  Right Heart: A device wire is noted in the right heart. The  right ventricle is not well visualized; grossly reduced  right ventricular systolic function. Normal tricuspid  valve. Minimal tricuspid regurgitation. Normal pulmonic  valve.  Pericardium/Pleura: Normal pericardium with no pericardial  effusion.  Hemodynamic: Estimated right atrial pressure is 8 mm Hg.  Estimated right ventricular systolic pressure equals 24 mm  Hg, assuming right atrial pressure equals 8 mm Hg,  consistent with normal pulmonary pressures.  ------------------------------------------------------------------------  Conclusions:  1. Endocardial visualization enhanced with intravenous  injection of echo contrast (Definity).  Despite the use of  echo contrast the visualization endocardium was still  suboptimal.  Moderate global left ventricular systolic  dysfunction.  2. Mild diastolic dysfunction (Stage I).  3. A device wire is noted in the right heart.  4. The right ventricle is not well visualized; grossly  reduced  right ventricular systolic function.  *** Compared with echocardiogram of 6/7/2017, EF similiar.  ------------------------------------------------------------------------  Confirmed on  1/14/2022 - 16:52:33 by JINNY Timmons

## 2023-07-15 NOTE — H&P ADULT - NS ATTEND AMEND GEN_ALL_CORE FT
53M with CAD, DM, ESRD on HD x 6 years for DDRT  UNOS   Donor 59 ICH, ABO A  Recipient ABO A  Donor CMV +, EBV +  Recipient CMV +, EBV +  KDPI 83%  Terminal Cr 0.8  xmatch negative    Patient examined at bedside and ha palpable femoral pulses b/l and dopplerable signals for b/l DP    Details of surgery discussed including risks of graft nonfunction, delayed function requiring dialysis, rejection, bleeding, infection, and anastomotic leak

## 2023-07-16 ENCOUNTER — TRANSCRIPTION ENCOUNTER (OUTPATIENT)
Age: 53
End: 2023-07-16

## 2023-07-16 DIAGNOSIS — D84.9 IMMUNODEFICIENCY, UNSPECIFIED: ICD-10-CM

## 2023-07-16 DIAGNOSIS — N17.9 ACUTE KIDNEY FAILURE, UNSPECIFIED: ICD-10-CM

## 2023-07-16 DIAGNOSIS — Z94.0 KIDNEY TRANSPLANT STATUS: ICD-10-CM

## 2023-07-16 DIAGNOSIS — I95.9 HYPOTENSION, UNSPECIFIED: ICD-10-CM

## 2023-07-16 LAB
ALBUMIN SERPL ELPH-MCNC: 3 G/DL — LOW (ref 3.3–5)
ALP SERPL-CCNC: 56 U/L — SIGNIFICANT CHANGE UP (ref 40–120)
ALT FLD-CCNC: 12 U/L — SIGNIFICANT CHANGE UP (ref 10–45)
ANION GAP SERPL CALC-SCNC: 16 MMOL/L — SIGNIFICANT CHANGE UP (ref 5–17)
ANION GAP SERPL CALC-SCNC: 17 MMOL/L — SIGNIFICANT CHANGE UP (ref 5–17)
ANION GAP SERPL CALC-SCNC: 18 MMOL/L — HIGH (ref 5–17)
ANION GAP SERPL CALC-SCNC: 18 MMOL/L — HIGH (ref 5–17)
APTT BLD: 26.1 SEC — LOW (ref 27.5–35.5)
APTT BLD: 26.8 SEC — LOW (ref 27.5–35.5)
AST SERPL-CCNC: 30 U/L — SIGNIFICANT CHANGE UP (ref 10–40)
BILIRUB SERPL-MCNC: 0.6 MG/DL — SIGNIFICANT CHANGE UP (ref 0.2–1.2)
BUN SERPL-MCNC: 45 MG/DL — HIGH (ref 7–23)
BUN SERPL-MCNC: 48 MG/DL — HIGH (ref 7–23)
BUN SERPL-MCNC: 50 MG/DL — HIGH (ref 7–23)
BUN SERPL-MCNC: 55 MG/DL — HIGH (ref 7–23)
CALCIUM SERPL-MCNC: 10.1 MG/DL — SIGNIFICANT CHANGE UP (ref 8.4–10.5)
CALCIUM SERPL-MCNC: 10.3 MG/DL — SIGNIFICANT CHANGE UP (ref 8.4–10.5)
CALCIUM SERPL-MCNC: 10.4 MG/DL — SIGNIFICANT CHANGE UP (ref 8.4–10.5)
CALCIUM SERPL-MCNC: 10.6 MG/DL — HIGH (ref 8.4–10.5)
CHLORIDE SERPL-SCNC: 100 MMOL/L — SIGNIFICANT CHANGE UP (ref 96–108)
CHLORIDE SERPL-SCNC: 98 MMOL/L — SIGNIFICANT CHANGE UP (ref 96–108)
CHLORIDE SERPL-SCNC: 98 MMOL/L — SIGNIFICANT CHANGE UP (ref 96–108)
CHLORIDE SERPL-SCNC: 99 MMOL/L — SIGNIFICANT CHANGE UP (ref 96–108)
CO2 SERPL-SCNC: 19 MMOL/L — LOW (ref 22–31)
CO2 SERPL-SCNC: 20 MMOL/L — LOW (ref 22–31)
CO2 SERPL-SCNC: 20 MMOL/L — LOW (ref 22–31)
CO2 SERPL-SCNC: 22 MMOL/L — SIGNIFICANT CHANGE UP (ref 22–31)
CREAT SERPL-MCNC: 6.55 MG/DL — HIGH (ref 0.5–1.3)
CREAT SERPL-MCNC: 6.97 MG/DL — HIGH (ref 0.5–1.3)
CREAT SERPL-MCNC: 7.03 MG/DL — HIGH (ref 0.5–1.3)
CREAT SERPL-MCNC: 7.73 MG/DL — HIGH (ref 0.5–1.3)
EGFR: 8 ML/MIN/1.73M2 — LOW
EGFR: 9 ML/MIN/1.73M2 — LOW
GAS PNL BLDA: SIGNIFICANT CHANGE UP
GLUCOSE BLDC GLUCOMTR-MCNC: 114 MG/DL — HIGH (ref 70–99)
GLUCOSE BLDC GLUCOMTR-MCNC: 136 MG/DL — HIGH (ref 70–99)
GLUCOSE BLDC GLUCOMTR-MCNC: 139 MG/DL — HIGH (ref 70–99)
GLUCOSE BLDC GLUCOMTR-MCNC: 145 MG/DL — HIGH (ref 70–99)
GLUCOSE BLDC GLUCOMTR-MCNC: 159 MG/DL — HIGH (ref 70–99)
GLUCOSE BLDC GLUCOMTR-MCNC: 159 MG/DL — HIGH (ref 70–99)
GLUCOSE BLDC GLUCOMTR-MCNC: 163 MG/DL — HIGH (ref 70–99)
GLUCOSE BLDC GLUCOMTR-MCNC: 170 MG/DL — HIGH (ref 70–99)
GLUCOSE BLDC GLUCOMTR-MCNC: 175 MG/DL — HIGH (ref 70–99)
GLUCOSE BLDC GLUCOMTR-MCNC: 187 MG/DL — HIGH (ref 70–99)
GLUCOSE BLDC GLUCOMTR-MCNC: 191 MG/DL — HIGH (ref 70–99)
GLUCOSE BLDC GLUCOMTR-MCNC: 192 MG/DL — HIGH (ref 70–99)
GLUCOSE BLDC GLUCOMTR-MCNC: 200 MG/DL — HIGH (ref 70–99)
GLUCOSE BLDC GLUCOMTR-MCNC: 204 MG/DL — HIGH (ref 70–99)
GLUCOSE BLDC GLUCOMTR-MCNC: 218 MG/DL — HIGH (ref 70–99)
GLUCOSE SERPL-MCNC: 143 MG/DL — HIGH (ref 70–99)
GLUCOSE SERPL-MCNC: 158 MG/DL — HIGH (ref 70–99)
GLUCOSE SERPL-MCNC: 189 MG/DL — HIGH (ref 70–99)
GLUCOSE SERPL-MCNC: 231 MG/DL — HIGH (ref 70–99)
HBV CORE AB SER-ACNC: SIGNIFICANT CHANGE UP
HBV SURFACE AB SER-ACNC: 5.7 MIU/ML — LOW
HCT VFR BLD CALC: 26.7 % — LOW (ref 39–50)
HCT VFR BLD CALC: 31.8 % — LOW (ref 39–50)
HCT VFR BLD CALC: 31.9 % — LOW (ref 39–50)
HCV AB S/CO SERPL IA: 0.16 S/CO — SIGNIFICANT CHANGE UP (ref 0–0.99)
HCV AB SERPL-IMP: SIGNIFICANT CHANGE UP
HGB BLD-MCNC: 10.3 G/DL — LOW (ref 13–17)
HGB BLD-MCNC: 10.7 G/DL — LOW (ref 13–17)
HGB BLD-MCNC: 9 G/DL — LOW (ref 13–17)
HIV 1+2 AB+HIV1 P24 AG SERPL QL IA: SIGNIFICANT CHANGE UP
INR BLD: 1.21 RATIO — HIGH (ref 0.88–1.16)
INR BLD: 1.28 RATIO — HIGH (ref 0.88–1.16)
MAGNESIUM SERPL-MCNC: 2.1 MG/DL — SIGNIFICANT CHANGE UP (ref 1.6–2.6)
MAGNESIUM SERPL-MCNC: 2.3 MG/DL — SIGNIFICANT CHANGE UP (ref 1.6–2.6)
MCHC RBC-ENTMCNC: 30.2 PG — SIGNIFICANT CHANGE UP (ref 27–34)
MCHC RBC-ENTMCNC: 30.6 PG — SIGNIFICANT CHANGE UP (ref 27–34)
MCHC RBC-ENTMCNC: 30.7 PG — SIGNIFICANT CHANGE UP (ref 27–34)
MCHC RBC-ENTMCNC: 32.4 GM/DL — SIGNIFICANT CHANGE UP (ref 32–36)
MCHC RBC-ENTMCNC: 33.5 GM/DL — SIGNIFICANT CHANGE UP (ref 32–36)
MCHC RBC-ENTMCNC: 33.7 GM/DL — SIGNIFICANT CHANGE UP (ref 32–36)
MCV RBC AUTO: 91.1 FL — SIGNIFICANT CHANGE UP (ref 80–100)
MCV RBC AUTO: 91.1 FL — SIGNIFICANT CHANGE UP (ref 80–100)
MCV RBC AUTO: 93.3 FL — SIGNIFICANT CHANGE UP (ref 80–100)
NRBC # BLD: 0 /100 WBCS — SIGNIFICANT CHANGE UP (ref 0–0)
PHOSPHATE SERPL-MCNC: 4.5 MG/DL — SIGNIFICANT CHANGE UP (ref 2.5–4.5)
PHOSPHATE SERPL-MCNC: 5.7 MG/DL — HIGH (ref 2.5–4.5)
PHOSPHATE SERPL-MCNC: 5.7 MG/DL — HIGH (ref 2.5–4.5)
PHOSPHATE SERPL-MCNC: 5.8 MG/DL — HIGH (ref 2.5–4.5)
PLATELET # BLD AUTO: 214 K/UL — SIGNIFICANT CHANGE UP (ref 150–400)
PLATELET # BLD AUTO: 286 K/UL — SIGNIFICANT CHANGE UP (ref 150–400)
PLATELET # BLD AUTO: 325 K/UL — SIGNIFICANT CHANGE UP (ref 150–400)
POTASSIUM SERPL-MCNC: 4.7 MMOL/L — SIGNIFICANT CHANGE UP (ref 3.5–5.3)
POTASSIUM SERPL-MCNC: 5.2 MMOL/L — SIGNIFICANT CHANGE UP (ref 3.5–5.3)
POTASSIUM SERPL-MCNC: 5.9 MMOL/L — HIGH (ref 3.5–5.3)
POTASSIUM SERPL-MCNC: 6 MMOL/L — HIGH (ref 3.5–5.3)
POTASSIUM SERPL-SCNC: 4.7 MMOL/L — SIGNIFICANT CHANGE UP (ref 3.5–5.3)
POTASSIUM SERPL-SCNC: 5.2 MMOL/L — SIGNIFICANT CHANGE UP (ref 3.5–5.3)
POTASSIUM SERPL-SCNC: 5.9 MMOL/L — HIGH (ref 3.5–5.3)
POTASSIUM SERPL-SCNC: 6 MMOL/L — HIGH (ref 3.5–5.3)
PROT SERPL-MCNC: 5.5 G/DL — LOW (ref 6–8.3)
PROTHROM AB SERPL-ACNC: 14 SEC — HIGH (ref 10.5–13.4)
PROTHROM AB SERPL-ACNC: 14.8 SEC — HIGH (ref 10.5–13.4)
RBC # BLD: 2.93 M/UL — LOW (ref 4.2–5.8)
RBC # BLD: 3.41 M/UL — LOW (ref 4.2–5.8)
RBC # BLD: 3.5 M/UL — LOW (ref 4.2–5.8)
RBC # FLD: 17.2 % — HIGH (ref 10.3–14.5)
RBC # FLD: 17.9 % — HIGH (ref 10.3–14.5)
RBC # FLD: 18.6 % — HIGH (ref 10.3–14.5)
SODIUM SERPL-SCNC: 136 MMOL/L — SIGNIFICANT CHANGE UP (ref 135–145)
SODIUM SERPL-SCNC: 137 MMOL/L — SIGNIFICANT CHANGE UP (ref 135–145)
WBC # BLD: 15.34 K/UL — HIGH (ref 3.8–10.5)
WBC # BLD: 15.78 K/UL — HIGH (ref 3.8–10.5)
WBC # BLD: 28.88 K/UL — HIGH (ref 3.8–10.5)
WBC # FLD AUTO: 15.34 K/UL — HIGH (ref 3.8–10.5)
WBC # FLD AUTO: 15.78 K/UL — HIGH (ref 3.8–10.5)
WBC # FLD AUTO: 28.88 K/UL — HIGH (ref 3.8–10.5)

## 2023-07-16 PROCEDURE — 93306 TTE W/DOPPLER COMPLETE: CPT | Mod: 26

## 2023-07-16 PROCEDURE — 71045 X-RAY EXAM CHEST 1 VIEW: CPT | Mod: 26

## 2023-07-16 PROCEDURE — 99223 1ST HOSP IP/OBS HIGH 75: CPT

## 2023-07-16 PROCEDURE — 50360 RNL ALTRNSPLJ W/O RCP NFRCT: CPT | Mod: GC

## 2023-07-16 PROCEDURE — 99222 1ST HOSP IP/OBS MODERATE 55: CPT

## 2023-07-16 PROCEDURE — 50605 INSERT URETERAL SUPPORT: CPT | Mod: LT

## 2023-07-16 PROCEDURE — 76776 US EXAM K TRANSPL W/DOPPLER: CPT | Mod: 26,LT

## 2023-07-16 PROCEDURE — 76998 US GUIDE INTRAOP: CPT | Mod: 26

## 2023-07-16 DEVICE — GUIDEWIRE SENSOR DUAL-FLEX NITINOL STRAIGHT .035" X 150CM: Type: IMPLANTABLE DEVICE | Site: RIGHT | Status: FUNCTIONAL

## 2023-07-16 DEVICE — SURGICEL 2 X 14": Type: IMPLANTABLE DEVICE | Site: RIGHT | Status: FUNCTIONAL

## 2023-07-16 DEVICE — STENT URET LUBRIFLEX 4.5X12CM: Type: IMPLANTABLE DEVICE | Site: RIGHT | Status: FUNCTIONAL

## 2023-07-16 DEVICE — VISTASEAL FIBRIN HUMAN 4ML: Type: IMPLANTABLE DEVICE | Site: RIGHT | Status: FUNCTIONAL

## 2023-07-16 DEVICE — KIT CVC 2LUM 7FRX16CM BLU FLX TIP: Type: IMPLANTABLE DEVICE | Site: RIGHT | Status: FUNCTIONAL

## 2023-07-16 DEVICE — KIT A-LINE 1LUM 20G X 12CM SAFE KIT: Type: IMPLANTABLE DEVICE | Site: RIGHT | Status: FUNCTIONAL

## 2023-07-16 RX ORDER — NYSTATIN 500MM UNIT
500000 POWDER (EA) MISCELLANEOUS
Refills: 0 | Status: DISCONTINUED | OUTPATIENT
Start: 2023-07-17 | End: 2023-07-24

## 2023-07-16 RX ORDER — SODIUM CHLORIDE 9 MG/ML
10 INJECTION INTRAMUSCULAR; INTRAVENOUS; SUBCUTANEOUS
Refills: 0 | Status: DISCONTINUED | OUTPATIENT
Start: 2023-07-16 | End: 2023-07-24

## 2023-07-16 RX ORDER — INSULIN HUMAN 100 [IU]/ML
10 INJECTION, SOLUTION SUBCUTANEOUS ONCE
Refills: 0 | Status: DISCONTINUED | OUTPATIENT
Start: 2023-07-16 | End: 2023-07-16

## 2023-07-16 RX ORDER — INSULIN HUMAN 100 [IU]/ML
5 INJECTION, SOLUTION SUBCUTANEOUS ONCE
Refills: 0 | Status: COMPLETED | OUTPATIENT
Start: 2023-07-16 | End: 2023-07-16

## 2023-07-16 RX ORDER — MYCOPHENOLATE MOFETIL 250 MG/1
1 CAPSULE ORAL
Refills: 0 | Status: DISCONTINUED | OUTPATIENT
Start: 2023-07-16 | End: 2023-07-24

## 2023-07-16 RX ORDER — INSULIN HUMAN 100 [IU]/ML
6 INJECTION, SOLUTION SUBCUTANEOUS
Qty: 100 | Refills: 0 | Status: DISCONTINUED | OUTPATIENT
Start: 2023-07-16 | End: 2023-07-17

## 2023-07-16 RX ORDER — DEXTROSE 50 % IN WATER 50 %
50 SYRINGE (ML) INTRAVENOUS ONCE
Refills: 0 | Status: COMPLETED | OUTPATIENT
Start: 2023-07-16 | End: 2023-07-16

## 2023-07-16 RX ORDER — CHLORHEXIDINE GLUCONATE 213 G/1000ML
1 SOLUTION TOPICAL
Refills: 0 | Status: DISCONTINUED | OUTPATIENT
Start: 2023-07-16 | End: 2023-07-24

## 2023-07-16 RX ORDER — SODIUM ZIRCONIUM CYCLOSILICATE 10 G/10G
10 POWDER, FOR SUSPENSION ORAL ONCE
Refills: 0 | Status: COMPLETED | OUTPATIENT
Start: 2023-07-16 | End: 2023-07-16

## 2023-07-16 RX ORDER — HYDROMORPHONE HYDROCHLORIDE 2 MG/ML
0.5 INJECTION INTRAMUSCULAR; INTRAVENOUS; SUBCUTANEOUS
Refills: 0 | Status: DISCONTINUED | OUTPATIENT
Start: 2023-07-16 | End: 2023-07-21

## 2023-07-16 RX ORDER — OXYCODONE HYDROCHLORIDE 5 MG/1
2.5 TABLET ORAL EVERY 4 HOURS
Refills: 0 | Status: DISCONTINUED | OUTPATIENT
Start: 2023-07-16 | End: 2023-07-17

## 2023-07-16 RX ORDER — SODIUM BICARBONATE 1 MEQ/ML
50 SYRINGE (ML) INTRAVENOUS ONCE
Refills: 0 | Status: COMPLETED | OUTPATIENT
Start: 2023-07-16 | End: 2023-07-16

## 2023-07-16 RX ORDER — MIDODRINE HYDROCHLORIDE 2.5 MG/1
10 TABLET ORAL EVERY 8 HOURS
Refills: 0 | Status: DISCONTINUED | OUTPATIENT
Start: 2023-07-16 | End: 2023-07-18

## 2023-07-16 RX ORDER — OXYCODONE HYDROCHLORIDE 5 MG/1
5 TABLET ORAL EVERY 4 HOURS
Refills: 0 | Status: DISCONTINUED | OUTPATIENT
Start: 2023-07-16 | End: 2023-07-17

## 2023-07-16 RX ORDER — DEXTROSE 50 % IN WATER 50 %
25 SYRINGE (ML) INTRAVENOUS ONCE
Refills: 0 | Status: COMPLETED | OUTPATIENT
Start: 2023-07-16 | End: 2023-07-16

## 2023-07-16 RX ORDER — VALGANCICLOVIR 450 MG/1
450 TABLET, FILM COATED ORAL DAILY
Refills: 0 | Status: DISCONTINUED | OUTPATIENT
Start: 2023-07-17 | End: 2023-07-19

## 2023-07-16 RX ORDER — ACETAMINOPHEN 500 MG
975 TABLET ORAL EVERY 6 HOURS
Refills: 0 | Status: COMPLETED | OUTPATIENT
Start: 2023-07-16 | End: 2023-07-18

## 2023-07-16 RX ORDER — NOREPINEPHRINE BITARTRATE/D5W 8 MG/250ML
0.05 PLASTIC BAG, INJECTION (ML) INTRAVENOUS
Qty: 8 | Refills: 0 | Status: DISCONTINUED | OUTPATIENT
Start: 2023-07-16 | End: 2023-07-16

## 2023-07-16 RX ORDER — FAMOTIDINE 10 MG/ML
20 INJECTION INTRAVENOUS DAILY
Refills: 0 | Status: DISCONTINUED | OUTPATIENT
Start: 2023-07-17 | End: 2023-07-24

## 2023-07-16 RX ORDER — SODIUM CHLORIDE 9 MG/ML
500 INJECTION, SOLUTION INTRAVENOUS
Refills: 0 | Status: DISCONTINUED | OUTPATIENT
Start: 2023-07-16 | End: 2023-07-17

## 2023-07-16 RX ORDER — DEXTROSE 50 % IN WATER 50 %
5 SYRINGE (ML) INTRAVENOUS ONCE
Refills: 0 | Status: DISCONTINUED | OUTPATIENT
Start: 2023-07-16 | End: 2023-07-16

## 2023-07-16 RX ORDER — TACROLIMUS 5 MG/1
8 CAPSULE ORAL ONCE
Refills: 0 | Status: COMPLETED | OUTPATIENT
Start: 2023-07-16 | End: 2023-07-16

## 2023-07-16 RX ORDER — INSULIN LISPRO 100/ML
5 VIAL (ML) SUBCUTANEOUS ONCE
Refills: 0 | Status: DISCONTINUED | OUTPATIENT
Start: 2023-07-16 | End: 2023-07-16

## 2023-07-16 RX ORDER — CALCIUM GLUCONATE 100 MG/ML
2 VIAL (ML) INTRAVENOUS ONCE
Refills: 0 | Status: COMPLETED | OUTPATIENT
Start: 2023-07-16 | End: 2023-07-16

## 2023-07-16 RX ORDER — SODIUM CHLORIDE 9 MG/ML
1000 INJECTION INTRAMUSCULAR; INTRAVENOUS; SUBCUTANEOUS
Refills: 0 | Status: DISCONTINUED | OUTPATIENT
Start: 2023-07-16 | End: 2023-07-17

## 2023-07-16 RX ADMIN — INSULIN HUMAN 5 UNIT(S): 100 INJECTION, SOLUTION SUBCUTANEOUS at 23:20

## 2023-07-16 RX ADMIN — OXYCODONE HYDROCHLORIDE 5 MILLIGRAM(S): 5 TABLET ORAL at 20:30

## 2023-07-16 RX ADMIN — Medication 200 GRAM(S): at 15:38

## 2023-07-16 RX ADMIN — MIDODRINE HYDROCHLORIDE 10 MILLIGRAM(S): 2.5 TABLET ORAL at 13:20

## 2023-07-16 RX ADMIN — OXYCODONE HYDROCHLORIDE 5 MILLIGRAM(S): 5 TABLET ORAL at 19:49

## 2023-07-16 RX ADMIN — Medication 9.06 MICROGRAM(S)/KG/MIN: at 13:20

## 2023-07-16 RX ADMIN — Medication 975 MILLIGRAM(S): at 23:21

## 2023-07-16 RX ADMIN — MIDODRINE HYDROCHLORIDE 10 MILLIGRAM(S): 2.5 TABLET ORAL at 21:08

## 2023-07-16 RX ADMIN — INSULIN HUMAN 5 UNIT(S): 100 INJECTION, SOLUTION SUBCUTANEOUS at 16:06

## 2023-07-16 RX ADMIN — CHLORHEXIDINE GLUCONATE 1 APPLICATION(S): 213 SOLUTION TOPICAL at 13:21

## 2023-07-16 RX ADMIN — SODIUM CHLORIDE 70 MILLILITER(S): 9 INJECTION INTRAMUSCULAR; INTRAVENOUS; SUBCUTANEOUS at 09:32

## 2023-07-16 RX ADMIN — TACROLIMUS 8 MILLIGRAM(S): 5 CAPSULE ORAL at 09:32

## 2023-07-16 RX ADMIN — Medication 975 MILLIGRAM(S): at 13:19

## 2023-07-16 RX ADMIN — Medication 975 MILLIGRAM(S): at 18:50

## 2023-07-16 RX ADMIN — Medication 500000 UNIT(S): at 23:21

## 2023-07-16 RX ADMIN — Medication 975 MILLIGRAM(S): at 14:04

## 2023-07-16 RX ADMIN — SODIUM CHLORIDE 70 MILLILITER(S): 9 INJECTION INTRAMUSCULAR; INTRAVENOUS; SUBCUTANEOUS at 13:20

## 2023-07-16 RX ADMIN — SODIUM ZIRCONIUM CYCLOSILICATE 10 GRAM(S): 10 POWDER, FOR SUSPENSION ORAL at 23:20

## 2023-07-16 RX ADMIN — SODIUM ZIRCONIUM CYCLOSILICATE 10 GRAM(S): 10 POWDER, FOR SUSPENSION ORAL at 17:33

## 2023-07-16 RX ADMIN — MYCOPHENOLATE MOFETIL 1 GRAM(S): 250 CAPSULE ORAL at 09:32

## 2023-07-16 RX ADMIN — Medication 25 MILLILITER(S): at 23:20

## 2023-07-16 RX ADMIN — Medication 975 MILLIGRAM(S): at 17:32

## 2023-07-16 RX ADMIN — INSULIN HUMAN 6 UNIT(S)/HR: 100 INJECTION, SOLUTION SUBCUTANEOUS at 13:20

## 2023-07-16 RX ADMIN — SODIUM CHLORIDE 70 MILLILITER(S): 9 INJECTION INTRAMUSCULAR; INTRAVENOUS; SUBCUTANEOUS at 19:49

## 2023-07-16 RX ADMIN — MYCOPHENOLATE MOFETIL 1 GRAM(S): 250 CAPSULE ORAL at 19:49

## 2023-07-16 RX ADMIN — Medication 50 MILLILITER(S): at 15:39

## 2023-07-16 RX ADMIN — Medication 50 MILLIEQUIVALENT(S): at 15:39

## 2023-07-16 NOTE — OCCUPATIONAL THERAPY INITIAL EVALUATION ADULT - PERTINENT HX OF CURRENT PROBLEM, REHAB EVAL
Pt is a 54 y/o M with hx of DM2 CAD, CHF, s/p CABG 2015, AICD (2016), on hemodialysis for approximately 6 years via L AVF (nephrologist Dr. Bharath Romo). He has hx of PVD, is s/p 4 stents in R leg (mid and distal right superficial femoral artery, right popliteal x2 on 1/14/22). Pt is s/p R big toe and second toe amputation 2021, on asa and Plavix for severe PVD (per pt). Pt admit to [facility] for possible DDRT. Pt reports dose not make urine only few drops occasionally. Pt denies N/V/D, fevers/chills, CP, SOB. Pt now s/p L DDRT on 7/16.

## 2023-07-16 NOTE — CONSULT NOTE ADULT - SUBJECTIVE AND OBJECTIVE BOX
HISTORY OF PRESENT ILLNESS:  ROSA CONDE is a 53y Male     PAST MEDICAL HISTORY: Diabetes mellitus    Foot ulcer due to secondary DM    Coronary artery disease    Acute on chronic systolic heart failure        PAST SURGICAL HISTORY: Breast Reduction    Toe amputation status, left    S/P CABG (coronary artery bypass graft)    AICD (automatic cardioverter/defibrillator) present        FAMILY HISTORY: Family history of diabetes mellitus (Father)        SOCIAL HISTORY:    CODE STATUS:     HOME MEDICATIONS:    ALLERGIES: Contrast. (Unknown)      VITAL SIGNS:  ICU Vital Signs Last 24 Hrs  T(C): 36.8 (16 Jul 2023 07:10), Max: 37 (15 Jul 2023 18:32)  T(F): 98.2 (16 Jul 2023 07:10), Max: 98.6 (15 Jul 2023 18:32)  HR: 86 (16 Jul 2023 07:30) (70 - 96)  BP: 129/62 (16 Jul 2023 07:15) (129/62 - 172/75)  BP(mean): 89 (16 Jul 2023 07:15) (89 - 89)  ABP: 139/68 (16 Jul 2023 07:30) (138/53 - 153/54)  ABP(mean): 89 (16 Jul 2023 07:30) (78 - 89)  RR: 22 (16 Jul 2023 07:30) (18 - 23)  SpO2: 100% (16 Jul 2023 07:30) (99% - 100%)    O2 Parameters below as of 16 Jul 2023 07:10  Patient On (Oxygen Delivery Method): nasal cannula  O2 Flow (L/min): 2          NEURO  Exam:      RESPIRATORY  Mechanical Ventilation:   ABG - ( 16 Jul 2023 07:14 )  pH: 7.31  /  pCO2: 34    /  pO2: 128   / HCO3: 17    / Base Excess: -8.3  /  SaO2: 97.9    Lactate: x                Exam:      CARDIOVASCULAR  VBG - ( 15 Jul 2023 21:20 )  pH: 7.42  /  pCO2: 48    /  pO2: 27    / HCO3: 31    / Base Excess: 5.8   /  SaO2: 44.8   Lactate: 1.7              Exam:  Cardiac Rhythm:      GI/NUTRITION  Exam:  Diet:      GENITOURINARY/RENAL  sodium chloride 0.45%. 500 milliLiter(s) IV Continuous <Continuous>  sodium chloride 0.9%. 1000 milliLiter(s) IV Continuous <Continuous>      Weight (kg): 96.6 (07-15 @ 22:39)  07-15    140  |  94<L>  |  46<H>  ----------------------------<  103<H>  4.8   |  28  |  7.14<H>    Ca    10.2      15 Jul 2023 21:22  Phos  5.5     07-15  Mg     2.6     07-15    TPro  7.4  /  Alb  4.3  /  TBili  0.3  /  DBili  x   /  AST  15  /  ALT  9<L>  /  AlkPhos  69  07-15    [ ] Samuels catheter, indication: urine output monitoring in critically ill patient    HEMATOLOGIC  [ ] VTE Prophylaxis:                          10.0   6.50  )-----------( 213      ( 15 Jul 2023 21:21 )             31.2     PT/INR - ( 15 Jul 2023 21:22 )   PT: 12.7 sec;   INR: 1.09 ratio         PTT - ( 15 Jul 2023 21:22 )  PTT:34.8 sec  Transfusion: [ ] PRBC	[ ] Platelets	[ ] FFP	[ ] Cryoprecipitate      INFECTIOUS DISEASES  mycophenolate mofetil 1 Gram(s) Oral <User Schedule>  tacrolimus ER Tablet (ENVARSUS XR) 8 milliGRAM(s) Oral once    RECENT CULTURES:      ENDOCRINE    CAPILLARY BLOOD GLUCOSE      POCT Blood Glucose.: 108 mg/dL (15 Jul 2023 21:15)      PATIENT CARE ACCESS DEVICES:  [ ] Peripheral IV  [ ] Central Venous Line	[ ] R	[ ] L	[ ] IJ	[ ] Fem	[ ] SC	Placed:   [ ] Arterial Line		[ ] R	[ ] L	[ ] Fem	[ ] Rad	[ ] Ax	Placed:   [ ] PICC:					[ ] Mediport  [ ] Urinary Catheter, Date Placed:   [x] Necessity of urinary, arterial, and venous catheters discussed    OTHER MEDICATIONS:     IMAGING STUDIES: HISTORY OF PRESENT ILLNESS:  Patient is a 53y Male  with hx of DM2 CAD, CHF, s/p CABG 2015, AICD (2016), on hemodialysis for approximately 6 years via L AVF (nephrologist Dr. Bharath Romo), He has hx of PVD, is s/p 4 stents in R leg (mid and distal right superficial femoral artery, right popliteal x2 on 1/14/22).  s/p  RT big toe and second toe amputation 2021, on asa and plavix for severe PVD (per pt).   Patient is s/p DDRT 7/16, requiring levophed drip and insulin drip post op with hyperkalemia, transferred to SICU for further management.  Patient received total of 2 units of blood intra op, with 3.5L fluids, and 25g mannitol, with 80mg of lasix and 500mg of solumedrol along with simulect. POst op extubated and transferred to SICU.     PAST MEDICAL HISTORY:   Diabetes mellitus  Foot ulcer due to secondary DM  Coronary artery disease  Acute on chronic systolic heart failure        PAST SURGICAL HISTORY:   Breast Reduction  Toe amputation status, left  S/P CABG (coronary artery bypass graft)  AICD (automatic cardioverter/defibrillator) present      FAMILY HISTORY:  Family history of diabetes mellitus (Father)    SOCIAL HISTORY:    CODE STATUS:     HOME MEDICATIONS:    ALLERGIES: Contrast. (Unknown)      VITAL SIGNS:  T(C): 36.8 (16 Jul 2023 07:10), Max: 37 (15 Jul 2023 18:32)  T(F): 98.2 (16 Jul 2023 07:10), Max: 98.6 (15 Jul 2023 18:32)  HR: 86 (16 Jul 2023 07:30) (70 - 96)  BP: 129/62 (16 Jul 2023 07:15) (129/62 - 172/75)  BP(mean): 89 (16 Jul 2023 07:15) (89 - 89)  ABP: 139/68 (16 Jul 2023 07:30) (138/53 - 153/54)  ABP(mean): 89 (16 Jul 2023 07:30) (78 - 89)  RR: 22 (16 Jul 2023 07:30) (18 - 23)  SpO2: 100% (16 Jul 2023 07:30) (99% - 100%)    O2 Parameters below as of 16 Jul 2023 07:10  Patient On (Oxygen Delivery Method): nasal cannula  O2 Flow (L/min): 2      NEURO  Exam: awake and alert x3, no focal deficit      RESPIRATORY  ABG - ( 16 Jul 2023 07:14 )  pH: 7.31  /  pCO2: 34    /  pO2: 128   / HCO3: 17    / Base Excess: -8.3  /  SaO2: 97.9    Exam: no audible wheezing      CARDIOVASCULAR  VBG - ( 15 Jul 2023 21:20 )  pH: 7.42  /  pCO2: 48    /  pO2: 27    / HCO3: 31    / Base Excess: 5.8   /  SaO2: 44.8   Lactate: 1.7   Exam: S1S2 RRR  Cardiac Rhythm: sinus      GI/NUTRITION  Exam: incicion intact with LIANA drain +sanguinous fluids  Diet: as tolerated      GENITOURINARY/RENAL  sodium chloride 0.45%. 500 milliLiter(s) IV Continuous <Continuous>  sodium chloride 0.9%. 1000 milliLiter(s) IV Continuous <Continuous>      Weight (kg): 96.6 (07-15 @ 22:39)  07-15    140  |  94<L>  |  46<H>  ----------------------------<  103<H>  4.8   |  28  |  7.14<H>    Ca    10.2      15 Jul 2023 21:22  Phos  5.5     07-15  Mg     2.6     07-15    TPro  7.4  /  Alb  4.3  /  TBili  0.3  /  DBili  x   /  AST  15  /  ALT  9<L>  /  AlkPhos  69  07-15    [ ] Samuels catheter, indication: urine output monitoring in critically ill patient    HEMATOLOGIC  [ ] VTE Prophylaxis:                          10.0   6.50  )-----------( 213      ( 15 Jul 2023 21:21 )             31.2     PT/INR - ( 15 Jul 2023 21:22 )   PT: 12.7 sec;   INR: 1.09 ratio         PTT - ( 15 Jul 2023 21:22 )  PTT:34.8 sec  Transfusion: [ ] PRBC	[ ] Platelets	[ ] FFP	[ ] Cryoprecipitate      INFECTIOUS DISEASES  mycophenolate mofetil 1 Gram(s) Oral <User Schedule>  tacrolimus ER Tablet (ENVARSUS XR) 8 milliGRAM(s) Oral once          ENDOCRINE    CAPILLARY BLOOD GLUCOSE      POCT Blood Glucose.: 108 mg/dL (15 Jul 2023 21:15)      PATIENT CARE ACCESS DEVICES:  [ ] Peripheral IV  [ ] Central Venous Line	[ ] R	[ ] L	[ ] IJ	[ ] Fem	[ ] SC	Placed:   [ ] Arterial Line		[ ] R	[ ] L	[ ] Fem	[ ] Rad	[ ] Ax	Placed:   [ ] PICC:					[ ] Mediport  [ ] Urinary Catheter, Date Placed:   [x] Necessity of urinary, arterial, and venous catheters discussed    OTHER MEDICATIONS:     IMAGING STUDIES:

## 2023-07-16 NOTE — CONSULT NOTE ADULT - SUBJECTIVE AND OBJECTIVE BOX
Patient is a 53y old  Male who presents with a chief complaint of DDRT (16 Jul 2023 12:58)      INTERVAL HPI/OVERNIGHT EVENTS:    Medications:MEDICATIONS  (STANDING):  acetaminophen     Tablet .. 975 milliGRAM(s) Oral every 6 hours  chlorhexidine 4% Liquid 1 Application(s) Topical <User Schedule>  insulin regular Infusion 6 Unit(s)/Hr (6 mL/Hr) IV Continuous <Continuous>  midodrine. 10 milliGRAM(s) Oral every 8 hours  mycophenolate mofetil 1 Gram(s) Oral <User Schedule>  norepinephrine Infusion 0.05 MICROgram(s)/kG/Min (9.06 mL/Hr) IV Continuous <Continuous>  sodium chloride 0.45%. 500 milliLiter(s) (50 mL/Hr) IV Continuous <Continuous>  sodium chloride 0.9%. 1000 milliLiter(s) (70 mL/Hr) IV Continuous <Continuous>    MEDICATIONS  (PRN):  HYDROmorphone  Injectable 0.5 milliGRAM(s) IV Push every 3 hours PRN breakthrough pain  oxyCODONE    IR 2.5 milliGRAM(s) Oral every 4 hours PRN Moderate Pain (4 - 6)  oxyCODONE    IR 5 milliGRAM(s) Oral every 4 hours PRN Severe Pain (7 - 10)  sodium chloride 0.9% lock flush 10 milliLiter(s) IV Push every 1 hour PRN Pre/post blood products, medications, blood draw, and to maintain line patency      Allergies: Allergies    Contrast. (Unknown)    Intolerances          FAMILY HISTORY:  Family history of diabetes mellitus (Father)          PAST MEDICAL & SURGICAL HISTORY:  Diabetes mellitus  type 2      Foot ulcer due to secondary DM      Coronary artery disease      Acute on chronic systolic heart failure      Breast Reduction  at age 18      Toe amputation status, left      S/P CABG (coronary artery bypass graft)      AICD (automatic cardioverter/defibrillator) present          REVIEW OF SYSTEMS:  CONSTITUTIONAL: No fever, weight loss, or fatigue  EYES: No eye pain, visual disturbances, or discharge  ENMT:  No difficulty hearing, tinnitus, vertigo; No sinus or throat pain  NECK: No pain or stiffness  BREASTS: No pain, masses, or nipple discharge  RESPIRATORY: No cough, wheezing, chills or hemoptysis; No shortness of breath  CARDIOVASCULAR: No chest pain, palpitations, dizziness, or leg swelling  GASTROINTESTINAL: No abdominal or epigastric pain. No nausea, vomiting, or hematemesis; No diarrhea or constipation. No melena or hematochezia.  GENITOURINARY: No dysuria, frequency, hematuria, or incontinence  NEUROLOGICAL: No headaches, memory loss, loss of strength, numbness, or tremors  SKIN: No itching, burning, rashes, or lesions   LYMPH NODES: No enlarged glands  ENDOCRINE: No heat or cold intolerance; No hair loss  MUSCULOSKELETAL: No joint pain or swelling; No muscle, back, or extremity pain  PSYCHIATRIC: No depression, anxiety, mood swings, or difficulty sleeping  HEME/LYMPH: No easy bruising, or bleeding gums  ALLERY AND IMMUNOLOGIC: No hives or eczema    T(C): 36.8 (07-16-23 @ 11:00), Max: 37 (07-15-23 @ 18:32)  HR: 85 (07-16-23 @ 13:00) (70 - 100)  BP: 153/70 (07-16-23 @ 13:00) (107/49 - 172/75)  RR: 13 (07-16-23 @ 13:00) (13 - 39)  SpO2: 97% (07-16-23 @ 13:00) (94% - 100%)  Wt(kg): --Vital Signs Last 24 Hrs  T(C): 36.8 (16 Jul 2023 11:00), Max: 37 (15 Jul 2023 18:32)  T(F): 98.2 (16 Jul 2023 11:00), Max: 98.6 (15 Jul 2023 18:32)  HR: 85 (16 Jul 2023 13:00) (70 - 100)  BP: 153/70 (16 Jul 2023 13:00) (107/49 - 172/75)  BP(mean): 100 (16 Jul 2023 13:00) (71 - 100)  RR: 13 (16 Jul 2023 13:00) (13 - 39)  SpO2: 97% (16 Jul 2023 13:00) (94% - 100%)    Parameters below as of 16 Jul 2023 11:00  Patient On (Oxygen Delivery Method): nasal cannula  O2 Flow (L/min): 2    I&O's Summary    15 Jul 2023 07:01  -  16 Jul 2023 07:00  --------------------------------------------------------  IN: 94.1 mL / OUT: 10 mL / NET: 84.1 mL    16 Jul 2023 07:01  -  16 Jul 2023 13:28  --------------------------------------------------------  IN: 716.7 mL / OUT: 180 mL / NET: 536.7 mL        PHYSICAL EXAM:  GENERAL: NAD, well-groomed, well-developed  HEAD:  Atraumatic, Normocephalic  EYES: EOMI, PERRLA, conjunctiva and sclera clear  ENMT: No tonsillar erythema, exudates, or enlargement; Moist mucous membranes, Good dentition, No lesions  NECK: Supple, No JVD, Normal thyroid  NERVOUS SYSTEM:  Alert & Oriented X3, Good concentration; Motor Strength 5/5 B/L upper and lower extremities; DTRs 2+ intact and symmetric  CHEST/LUNG: Clear to percussion bilaterally; No rales, rhonchi, wheezing, or rubs  HEART: Regular rate and rhythm; No murmurs, rubs, or gallops  ABDOMEN: Soft, Nontender, Nondistended; Bowel sounds present  EXTREMITIES:  2+ Peripheral Pulses, No clubbing, cyanosis, or edema  LYMPH: No lymphadenopathy noted  SKIN: No rashes or lesions    Consultant(s) Notes Reviewed:  [x ] YES  [ ] NO  Care Discussed with Consultants/Other Providerscpk [ x] YES  [ ] NO    LABS:                    CBC Full  -  ( 16 Jul 2023 10:05 )  WBC Count : 28.88 K/uL  RBC Count : 3.50 M/uL  Hemoglobin : 10.7 g/dL  Hematocrit : 31.9 %  Platelet Count - Automated : 325 K/uL  Mean Cell Volume : 91.1 fl  Mean Cell Hemoglobin : 30.6 pg  Mean Cell Hemoglobin Concentration : 33.5 gm/dL  Auto Neutrophil # : x  Auto Lymphocyte # : x  Auto Monocyte # : x  Auto Eosinophil # : x  Auto Basophil # : x  Auto Neutrophil % : x  Auto Lymphocyte % : x  Auto Monocyte % : x  Auto Eosinophil % : x  Auto Basophil % : x      07-16    136  |  98  |  48<H>  ----------------------------<  158<H>  5.2   |  20<L>  |  7.03<H>    Ca    10.4      16 Jul 2023 10:05  Phos  5.7     07-16  Mg     2.3     07-16    TPro  5.5<L>  /  Alb  3.0<L>  /  TBili  0.6  /  DBili  x   /  AST  30  /  ALT  12  /  AlkPhos  56  07-16      Urinalysis Basic - ( 16 Jul 2023 10:05 )    Color: x / Appearance: x / SG: x / pH: x  Gluc: 158 mg/dL / Ketone: x  / Bili: x / Urobili: x   Blood: x / Protein: x / Nitrite: x   Leuk Esterase: x / RBC: x / WBC x   Sq Epi: x / Non Sq Epi: x / Bacteria: x        PT/INR - ( 16 Jul 2023 10:05 )   PT: 14.0 sec;   INR: 1.21 ratio         PTT - ( 16 Jul 2023 10:05 )  PTT:26.1 sec  RADIOLOGY & ADDITIONAL TESTS:    Imaging Personally Reviewed:  [ ] YES  [ ] NO   Patient is a 53y old  Male who presents with a chief complaint of DDRT (16 Jul 2023 12:58)  hpi reviewed    INTERVAL HPI/OVERNIGHT EVENTS:    Medications:MEDICATIONS  (STANDING):  acetaminophen     Tablet .. 975 milliGRAM(s) Oral every 6 hours  chlorhexidine 4% Liquid 1 Application(s) Topical <User Schedule>  insulin regular Infusion 6 Unit(s)/Hr (6 mL/Hr) IV Continuous <Continuous>  midodrine. 10 milliGRAM(s) Oral every 8 hours  mycophenolate mofetil 1 Gram(s) Oral <User Schedule>  norepinephrine Infusion 0.05 MICROgram(s)/kG/Min (9.06 mL/Hr) IV Continuous <Continuous>  sodium chloride 0.45%. 500 milliLiter(s) (50 mL/Hr) IV Continuous <Continuous>  sodium chloride 0.9%. 1000 milliLiter(s) (70 mL/Hr) IV Continuous <Continuous>    MEDICATIONS  (PRN):  HYDROmorphone  Injectable 0.5 milliGRAM(s) IV Push every 3 hours PRN breakthrough pain  oxyCODONE    IR 2.5 milliGRAM(s) Oral every 4 hours PRN Moderate Pain (4 - 6)  oxyCODONE    IR 5 milliGRAM(s) Oral every 4 hours PRN Severe Pain (7 - 10)  sodium chloride 0.9% lock flush 10 milliLiter(s) IV Push every 1 hour PRN Pre/post blood products, medications, blood draw, and to maintain line patency      Allergies: Allergies    Contrast. (Unknown)    Intolerances          FAMILY HISTORY:  Family history of diabetes mellitus (Father)          PAST MEDICAL & SURGICAL HISTORY:  Diabetes mellitus  type 2      Foot ulcer due to secondary DM      Coronary artery disease      Acute on chronic systolic heart failure      Breast Reduction  at age 18      Toe amputation status, left      S/P CABG (coronary artery bypass graft)      AICD (automatic cardioverter/defibrillator) present          REVIEW OF SYSTEMS:  CONSTITUTIONAL: No fever, weight loss, or fatigue  EYES: No eye pain, visual disturbances, or discharge    RESPIRATORY: No cough, wheezing, chills or hemoptysis;  CARDIOVASCULAR: No chest pain, palpitations, dizziness,   GASTROINTESTINAL: abd pain   G  NEUROLOGICAL: No headaches,    T(C): 36.8 (07-16-23 @ 11:00), Max: 37 (07-15-23 @ 18:32)  HR: 85 (07-16-23 @ 13:00) (70 - 100)  BP: 153/70 (07-16-23 @ 13:00) (107/49 - 172/75)  RR: 13 (07-16-23 @ 13:00) (13 - 39)  SpO2: 97% (07-16-23 @ 13:00) (94% - 100%)  Wt(kg): --Vital Signs Last 24 Hrs  T(C): 36.8 (16 Jul 2023 11:00), Max: 37 (15 Jul 2023 18:32)  T(F): 98.2 (16 Jul 2023 11:00), Max: 98.6 (15 Jul 2023 18:32)  HR: 85 (16 Jul 2023 13:00) (70 - 100)  BP: 153/70 (16 Jul 2023 13:00) (107/49 - 172/75)  BP(mean): 100 (16 Jul 2023 13:00) (71 - 100)  RR: 13 (16 Jul 2023 13:00) (13 - 39)  SpO2: 97% (16 Jul 2023 13:00) (94% - 100%)    Parameters below as of 16 Jul 2023 11:00  Patient On (Oxygen Delivery Method): nasal cannula  O2 Flow (L/min): 2    I&O's Summary    15 Jul 2023 07:01  -  16 Jul 2023 07:00  --------------------------------------------------------  IN: 94.1 mL / OUT: 10 mL / NET: 84.1 mL    16 Jul 2023 07:01  -  16 Jul 2023 13:28  --------------------------------------------------------  IN: 716.7 mL / OUT: 180 mL / NET: 536.7 mL        PHYSICAL EXAM:  GENERAL: NAD  HEAD:  Atraumatic, Normocephalic  EYES: EOMI, PERRLA, conjunctiva and sclera clear  ENMT: No tonsillar erythema, exudates, or enlargement;   NECK: Supple, No JVD, Normal thyroid  NERVOUS SYSTEM:  Alert & Oriented    non focal  CHEST/LUNG: Clear to percussion bilaterally; No rales, rhonchi, wheezing, or rubs  HEART: Regular rate and rhythm; No murmurs, rubs, or gallops  ABDOMEN: Soft, tender        Consultant(s) Notes Reviewed:  [x ] YES  [ ] NO  Care Discussed with Consultants/Other Providerscpk [ x] YES  [ ] NO    LABS:                    CBC Full  -  ( 16 Jul 2023 10:05 )  WBC Count : 28.88 K/uL  RBC Count : 3.50 M/uL  Hemoglobin : 10.7 g/dL  Hematocrit : 31.9 %  Platelet Count - Automated : 325 K/uL  Mean Cell Volume : 91.1 fl  Mean Cell Hemoglobin : 30.6 pg  Mean Cell Hemoglobin Concentration : 33.5 gm/dL  Auto Neutrophil # : x  Auto Lymphocyte # : x  Auto Monocyte # : x  Auto Eosinophil # : x  Auto Basophil # : x  Auto Neutrophil % : x  Auto Lymphocyte % : x  Auto Monocyte % : x  Auto Eosinophil % : x  Auto Basophil % : x      07-16    136  |  98  |  48<H>  ----------------------------<  158<H>  5.2   |  20<L>  |  7.03<H>    Ca    10.4      16 Jul 2023 10:05  Phos  5.7     07-16  Mg     2.3     07-16    TPro  5.5<L>  /  Alb  3.0<L>  /  TBili  0.6  /  DBili  x   /  AST  30  /  ALT  12  /  AlkPhos  56  07-16      Urinalysis Basic - ( 16 Jul 2023 10:05 )    Color: x / Appearance: x / SG: x / pH: x  Gluc: 158 mg/dL / Ketone: x  / Bili: x / Urobili: x   Blood: x / Protein: x / Nitrite: x   Leuk Esterase: x / RBC: x / WBC x   Sq Epi: x / Non Sq Epi: x / Bacteria: x        PT/INR - ( 16 Jul 2023 10:05 )   PT: 14.0 sec;   INR: 1.21 ratio         PTT - ( 16 Jul 2023 10:05 )  PTT:26.1 sec  RADIOLOGY & ADDITIONAL TESTS:    Imaging Personally Reviewed:  [ ] YES  [ ] NO

## 2023-07-16 NOTE — BRIEF OPERATIVE NOTE - OPERATION/FINDINGS
Left DDRT to Left iliac fossa.  Donor kidney noted to be large and fatty with 1 vein, 1 artery, and 1 ureter.  Induction with 500mg Solumedrol and Simulect.   Spermatic cord identified and preserved  End to side renal vein to Left common iliac vein anastomosis without leak.  End to side renal artery to left common iliac artery anastomosis without leak. Cold ischemia time 16 hours.   Reperfusion with some oozing from hilum and kidney fat, controlled.  Left inferior epigastric artery and vein identified, ligated and transected  End to side ureteroneocystostomy over stent. Ureter under cord structures.  19 fr soraya drain placed behind the kidney.  Hemostasis achieved  Closed in layers.

## 2023-07-16 NOTE — CONSULT NOTE ADULT - SUBJECTIVE AND OBJECTIVE BOX
CONSULT NOTE  --------------------------------------------------------------------------------  HPI:        PAST HISTORY  --------------------------------------------------------------------------------  PAST MEDICAL & SURGICAL HISTORY:  Diabetes mellitus  type 2      Foot ulcer due to secondary DM      Coronary artery disease      Acute on chronic systolic heart failure      Breast Reduction  at age 18      Toe amputation status, left      S/P CABG (coronary artery bypass graft)      AICD (automatic cardioverter/defibrillator) present        FAMILY HISTORY:  Family history of diabetes mellitus (Father)      PAST SOCIAL HISTORY:    ALLERGIES & MEDICATIONS  --------------------------------------------------------------------------------  Allergies    Contrast. (Unknown)    Intolerances      Standing Inpatient Medications  acetaminophen     Tablet .. 975 milliGRAM(s) Oral every 6 hours  chlorhexidine 4% Liquid 1 Application(s) Topical <User Schedule>  insulin regular Infusion 6 Unit(s)/Hr IV Continuous <Continuous>  midodrine. 10 milliGRAM(s) Oral every 8 hours  mycophenolate mofetil 1 Gram(s) Oral <User Schedule>  norepinephrine Infusion 0.05 MICROgram(s)/kG/Min IV Continuous <Continuous>  sodium chloride 0.45%. 500 milliLiter(s) IV Continuous <Continuous>  sodium chloride 0.9%. 1000 milliLiter(s) IV Continuous <Continuous>    PRN Inpatient Medications  HYDROmorphone  Injectable 0.5 milliGRAM(s) IV Push every 3 hours PRN  oxyCODONE    IR 2.5 milliGRAM(s) Oral every 4 hours PRN  oxyCODONE    IR 5 milliGRAM(s) Oral every 4 hours PRN  sodium chloride 0.9% lock flush 10 milliLiter(s) IV Push every 1 hour PRN      REVIEW OF SYSTEMS  --------------------------------------------------------------------------------  Gen: No weight changes, fatigue, fevers/chills, weakness  Skin: No rashes  Head/Eyes/Ears/Mouth: No headache; Normal hearing; Normal vision w/o blurriness; No sinus pain/discomfort, sore throat  Respiratory: No dyspnea, cough, wheezing, hemoptysis  CV: No chest pain, PND, orthopnea  GI: No abdominal pain, diarrhea, constipation, nausea, vomiting, melena, hematochezia  : No increased frequency, dysuria, hematuria, nocturia  MSK: No joint pain/swelling; no back pain; no edema  Neuro: No dizziness/lightheadedness, weakness, seizures, numbness, tingling  Heme: No easy bruising or bleeding  Endo: No heat/cold intolerance  Psych: No significant nervousness, anxiety, stress, depression    All other systems were reviewed and are negative, except as noted.    VITALS/PHYSICAL EXAM  --------------------------------------------------------------------------------  T(C): 36.8 (07-16-23 @ 11:00), Max: 37 (07-15-23 @ 18:32)  HR: 102 (07-16-23 @ 14:00) (70 - 102)  BP: 135/60 (07-16-23 @ 14:00) (107/49 - 172/75)  RR: 17 (07-16-23 @ 14:00) (12 - 39)  SpO2: 99% (07-16-23 @ 14:00) (94% - 100%)  Wt(kg): --  Height (cm): 175.3 (07-15-23 @ 22:39)  Weight (kg): 96.6 (07-15-23 @ 22:39)  BMI (kg/m2): 31.4 (07-15-23 @ 22:39)  BSA (m2): 2.12 (07-15-23 @ 22:39)      07-15-23 @ 07:01  -  07-16-23 @ 07:00  --------------------------------------------------------  IN: 94.1 mL / OUT: 10 mL / NET: 84.1 mL    07-16-23 @ 07:01  -  07-16-23 @ 14:05  --------------------------------------------------------  IN: 793.3 mL / OUT: 190 mL / NET: 603.3 mL      Physical Exam:  	Gen: no distress  	HEENT: no acute signs  	Pulm: CTA B/L  	CV:  no rub  	Abd:  nondistended                      Transplant: LLQ, noted drain  	:   Samuels+  	UE: no acute signs  	LE: no acute signs, Toe amputations noted  	Neuro: No focal deficits, A, O X3, No asterixis  	Psych: Normal affect and mood  	Skin: surgical incision  	Vascular access: Left UE AVF +    LABS/STUDIES  --------------------------------------------------------------------------------              10.7   28.88 >-----------<  325      [07-16-23 @ 10:05]              31.9     136  |  98  |  48  ----------------------------<  158      [07-16-23 @ 10:05]  5.2   |  20  |  7.03        Ca     10.4     [07-16-23 @ 10:05]      Mg     2.3     [07-16-23 @ 10:05]      Phos  5.7     [07-16-23 @ 10:05]    TPro  5.5  /  Alb  3.0  /  TBili  0.6  /  DBili  x   /  AST  30  /  ALT  12  /  AlkPhos  56  [07-16-23 @ 07:29]    PT/INR: PT 14.0 , INR 1.21       [07-16-23 @ 10:05]  PTT: 26.1       [07-16-23 @ 10:05]      Creatinine Trend:  SCr 7.03 [07-16 @ 10:05]  SCr 6.55 [07-16 @ 07:29]  SCr 7.14 [07-15 @ 21:22]    Urinalysis - [07-16-23 @ 10:05]      Color  / Appearance  / SG  / pH       Gluc 158 / Ketone   / Bili  / Urobili        Blood  / Protein  / Leuk Est  / Nitrite       RBC  / WBC  / Hyaline  / Gran  / Sq Epi  / Non Sq Epi  / Bacteria       TSH 5.16      [07-22-22 @ 04:43]    HBsAb 5.7      [07-15-23 @ 21:22]  HBsAb Reactive      [07-21-22 @ 17:23]  HBsAg Nonreact      [07-15-23 @ 21:21]  HBcAb Nonreact      [07-15-23 @ 21:22]  HCV 0.16, Nonreact      [07-15-23 @ 21:22]  HIV Nonreact      [07-15-23 @ 21:21]

## 2023-07-16 NOTE — PROGRESS NOTE ADULT - ASSESSMENT
52 yo M with hx of DM2 CAD, CHF, s/p CABG 2015, AICD (2016), on hemodialysis for approximately 6 years via L AVF (nephrologist Dr. Bharath Romo), He has hx of PVD, is s/p 4 stents in R leg (mid and distal right superficial femoral artery, right popliteal x2 on 1/14/22).; is sp RT big toe and second toe amputation 2021, on asa and plavix for severe PVD (per pt).     Now here for DDRT. Pt reports dose not make urine only few drops occasionally. Pt denies N/V/D, fevers/chills, CP, SOB. Pt reports last ate at 4pm today and had HD yesterday 7/14/23.      Underwent L DDRT with CIT 16hrs, 1A,1V,1U. Started on insulin drip intraoperatively for hyperkalemia after reperfusion. He was started on pressors (Levo - 0.004) intraoperatively and received 2U PRBC. Got Simulect and solumedrol for induction.     Plan:    POD#0 - L DDRT  Maintain Blood pressure with pressor support  Monitor urine output, creatinine  F/u final read on US transplanted kidney  Monitor potassium levels  Repeat labs in 6hrs  Monitor LIANA output, keep bulb suction     Immunosuppression:   Simulect, solumedrol given   MMF, Methylpred taper, Env   Switch Valcyte to every other day (MWF)     Cardiovascular:  F/u EKG,   ECHOcardiogram performed, follow up report  Pressors for cardiovascular support    DM:   Insulin drip , monitor Glucose per protocol    PAD:   Aspirin and plavix held

## 2023-07-16 NOTE — OCCUPATIONAL THERAPY INITIAL EVALUATION ADULT - ADL RETRAINING, OT EVAL
Pt will perform LB dressing with independence within 2 weeks. Pt will perform toileting with independence within 2 weeks.

## 2023-07-16 NOTE — OCCUPATIONAL THERAPY INITIAL EVALUATION ADULT - LEVEL OF INDEPENDENCE: SIT/STAND, REHAB EVAL
pt unable, dizzy and low BP while EOB pt unable, dizzy and low BP while EOB/contact guard/minimum assist (75% patients effort)

## 2023-07-16 NOTE — OCCUPATIONAL THERAPY INITIAL EVALUATION ADULT - GENERAL OBSERVATIONS, REHAB EVAL
Pt received supine in bed with +LIANA drain, +cardiac monitor, +NC, +Samuels, +PIV, +A line, +IJ Pt received supine in bed with +LIANA drain, +cardiac monitor, +NC, +Gradna, +PIV, +A line, +IJ / pt received sitting in chair +vitals monitoring, LIANA, granda, IV

## 2023-07-16 NOTE — PHYSICAL THERAPY INITIAL EVALUATION ADULT - PERTINENT HX OF CURRENT PROBLEM, REHAB EVAL
Pt is a 54 y/o M with hx of DM2 CAD, CHF, s/p CABG 2015, AICD (2016), on hemodialysis for approximately 6 years via L AVF (nephrologist Dr. Bharath Romo). He has hx of PVD, is s/p 4 stents in R leg (mid and distal right superficial femoral artery, right popliteal x2 on 1/14/22). Pt is s/p R big toe and second toe amputation 2021, on asa and plavix for severe PVD (per pt). Pt admtit to [facility] for possible DDRT. Pt reports dose not make urine only few drops occasionally. Pt denies N/V/D, fevers/chills, CP, SOB. Pt now s/p L DDRT on 7/16.   Preliminary report of XR abd/chest: Nonobstructive bowel gas pattern. No free air on this   single supine radiograph.   Decreased small left pleural effusion.

## 2023-07-16 NOTE — BRIEF OPERATIVE NOTE - COMMENTS
Wound class 2  Patient required Levophed intraop  After reperfusion, patient had hyperkalemia requiring insulin gtt. UNOS [id number]  Donor 59 COD CVA  Donor ABO A  Recipient ABO A  Donor CMV +, EBV +  Recipient CMV +, EBV +  KDPI 83%  Terminal Cr 0.8   Anatomy: single artery, single vein, single ureter. Standard anastomoses  Left kidney to leftt external iliac vessels.   Total ischemia time:  16 hours  Anastomosis time: 71 min  Induction: Simulect and Solumedrol   +ureteral stent 19

## 2023-07-16 NOTE — PHYSICAL THERAPY INITIAL EVALUATION ADULT - GENERAL OBSERVATIONS, REHAB EVAL
Pt received supine in NAD, (+)ICU monitoring, tele/pulse ox, L LIANA, R IJ CVC (noted bleeding but stable, per RN Beryl RN/team is aware okay to mobilize pt) granda, PIV, dressings intact, A&Ox4, VS screened and stable.

## 2023-07-16 NOTE — PROGRESS NOTE ADULT - SUBJECTIVE AND OBJECTIVE BOX
54 yo M with hx of DM2 CAD, CHF, s/p CABG 2015, AICD (2016), on hemodialysis for approximately 6 years via L AVF (nephrologist Dr. Bharath Romo), He has hx of PVD, is s/p 4 stents in R leg (mid and distal right superficial femoral artery, right popliteal x2 on 1/14/22).; is sp RT big toe and second toe amputation 2021, on asa and plavix for severe PVD (per pt).     Now here for DDRT. Pt reports dose not make urine only few drops occasionally. Pt denies N/V/D, fevers/chills, CP, SOB. Pt reports last ate at 4pm today and had HD yesterday 7/14/23.      Underwent L DDRT with CIT 16hrs, 1A,1V,1U. Started on insulin drip intraoperatively for hyperkalemia after reperfusion. He was started on pressors (Levo - 0.004) intraoperatively and received 2U PRBC. Got Simulect and solumedrol for induction.     Interval events:   POD#0, brought to SICU extubated for further management.  Remains on pressors - 0.08 levophed for maintaining MAP  Urine output ~40cc/hr, mild blood tinged urine  LIANA output remains serosanguinous   Graft ultrasound - well perfused kidney with no obvious renal artery stenosis. High normal velocities.   ECHOcardiogram performed, pending report.   Continued on insulin drip.   Hb remains stable, K elevated to 5.2 from 4.7.     I&O's Summary    15 Jul 2023 07:01  -  16 Jul 2023 07:00  --------------------------------------------------------  IN: 94.1 mL / OUT: 0 mL / NET: 94.1 mL    16 Jul 2023 07:01  -  16 Jul 2023 13:04  --------------------------------------------------------  IN: 456.2 mL / OUT: 140 mL / NET: 316.2 mL    ICU Vital Signs Last 24 Hrs  T(C): 36.8 (16 Jul 2023 11:00), Max: 37 (15 Jul 2023 18:32)  T(F): 98.2 (16 Jul 2023 11:00), Max: 98.6 (15 Jul 2023 18:32)  HR: 94 (16 Jul 2023 11:45) (70 - 98)  BP: 151/68 (16 Jul 2023 11:00) (107/49 - 172/75)  BP(mean): 98 (16 Jul 2023 11:00) (71 - 98)  ABP: 156/59 (16 Jul 2023 11:45) (89/47 - 156/59)  ABP(mean): 86 (16 Jul 2023 11:45) (60 - 89)  RR: 22 (16 Jul 2023 11:45) (13 - 39)  SpO2: 96% (16 Jul 2023 11:45) (94% - 100%)    O2 Parameters below as of 16 Jul 2023 11:00  Patient On (Oxygen Delivery Method): nasal cannula  O2 Flow (L/min): 2      CBC Full  -  ( 16 Jul 2023 10:05 )  WBC Count : 28.88 K/uL  RBC Count : 3.50 M/uL  Hemoglobin : 10.7 g/dL  Hematocrit : 31.9 %  Platelet Count - Automated : 325 K/uL  Mean Cell Volume : 91.1 fl  Mean Cell Hemoglobin : 30.6 pg  Mean Cell Hemoglobin Concentration : 33.5 gm/dL  Auto Neutrophil # : x  Auto Lymphocyte # : x  Auto Monocyte # : x  Auto Eosinophil # : x  Auto Basophil # : x  Auto Neutrophil % : x  Auto Lymphocyte % : x  Auto Monocyte % : x  Auto Eosinophil % : x  Auto Basophil % : x    BMI: BMI (kg/m2): 31.4 (07-15-23 @ 22:39)  HbA1c: A1C with Estimated Average Glucose Result: 5.8 % (07-22-22 @ 04:43)    Glucose: POCT Blood Glucose.: 114 mg/dL (07-16-23 @ 12:01)    BP: 151/68 (07-16-23 @ 11:00) (107/49 - 172/75)

## 2023-07-16 NOTE — CONSULT NOTE ADULT - NS ATTEND AMEND GEN_ALL_CORE FT
Patient is a 53y Male  s/p DDRT PODX 0. On vasopressor support with Levophed and Insulin drip gtt  PMX:  DM2 CAD, CHF, s/p CABG 2015, AICD (2016), ESRD,  PVD s/p 4 stents in R leg, s/p  RT big toe and second toe amputation 2021, on asa and plavix for severe PVD   Awake and alert, main control IC Tylenol and opoids  Saturating well, CXR minimal pul vascular congestion  Wean off Levophed, requirement decreasing. Add Midodrine, pt was on Midodrine preop  Clears  IVF NS as per protocol, hyperkalemia shifted. Will shift again for K 5.2.Urine output 10cc per hour  range . HD as needed  PPX abx: Bactrim, Valcyte, Nystatin susp  Anti rej: Pred, Cellcept, Simulect  Pt was oozy in OR, HCT > 10, will continue to monitor. On [facility] DVT ppx    Discussed with transplant team and nephrology Patient is a 53y Male  s/p DDRT PODX 0. On vasopressor support with Levophed and Insulin drip gtt  PMX:  DM2 CAD, CHF, s/p CABG 2015, AICD (2016), ESRD,  PVD s/p 4 stents in R leg, s/p  RT big toe and second toe amputation 2021, on asa and plavix for severe PVD   Awake and alert, main control IC Tylenol and opoids  Saturating well, CXR minimal pul vascular congestion  Wean off Levophed, requirement decreasing. Add Midodrine, pt was on Midodrine preop  Clears  IVF NS as per protocol, hyperkalemia shifted. Will shift again for K 5.2.Urine output 10cc per hour  range . HD as needed  Insulin drp and weaning as per protocol    PPX abx: Bactrim, Valcyte, Nystatin susp  Anti rej: Pred, Cellcept, Simulect  Pt was oozy in OR, HCT > 10, will continue to monitor. On [facility] DVT ppx    Discussed with transplant team and nephrology Patient is a 53y Male  s/p DDRT PODX 0. On vasopressor support with Levophed and Insulin drip gtt  PMX:  DM2 CAD, CHF, s/p CABG 2015, AICD (2016), ESRD,  PVD s/p 4 stents in R leg, s/p  RT big toe and second toe amputation 2021, on asa and plavix for severe PVD     Awake and alert, main control IC Tylenol and opoids  Saturating well, CXR minimal pul vascular congestion  Etiology of hypotension not clear to me. Wean off Levophed, requirement decreasing. Add Midodrine, pt was on Midodrine preop  Clears  IVF NS as per protocol, hyperkalemia shifted. Will shift again for K 5.2.Urine output 10cc per hour  range . HD as needed  Insulin drp and weaning as per protocol    PPX abx: Bactrim, Valcyte, Nystatin susp  Anti rej: Pred, Cellcept, Simulect  Pt was oozy in OR, HCT > 10, will continue to monitor. On [facility] DVT ppx    Discussed with transplant team and nephrology

## 2023-07-16 NOTE — CONSULT NOTE ADULT - TIME BILLING
Kidney Transplant recipient with GIN, oliguric, low blood pressure  Creatinine trend noted  Comorbidities reviewed. DM, CAD, low blood pressure on Midodrine  Patient seen, examined and reviewed available clinical and lab data including history,  progress notes and consult notes.  Reviewed immunosuppression and allograft function including urine out put and allograft   imaging.  Reviewed medication regimen for glycemic control and hemodynamic support  Suggestions:  Tacrolimus target 8-10 ng/ml  Consider starting Midodrine 5 mg/d  Monitor urine output, K, creatinine  Will follow and monitor for dialysis need  I was present during and reviewed clinical and lab data as well as assessment and plan as documented . Please contact if any additional questions with any change in clinical condition or on availability of any additional information or reports.

## 2023-07-16 NOTE — PHYSICAL THERAPY INITIAL EVALUATION ADULT - ADDITIONAL COMMENTS
Pt lives in private house with his sister, 4 steps to enter +handrail, 1/2 bath downstairs, 1 flight of stairs to bedroom and full bath.  Pt independently performs ADLs and ambulates without AD.

## 2023-07-16 NOTE — CONSULT NOTE ADULT - ASSESSMENT
Patient is a 53y Male  with hx of DM2 CAD, CHF, s/p CABG 2015, AICD (2016), on hemodialysis for approximately 6 years via L AVF (nephrologist Dr. Bharath Romo), He has hx of PVD, is s/p 4 stents in R leg (mid and distal right superficial femoral artery, right popliteal x2 on 1/14/22).  s/p  RT big toe and second toe amputation 2021, on asa and plavix for severe PVD (per pt).   Patient is s/p DDRT 7/16, requiring levophed drip and insulin drip post op with hyperkalemia, transferred to SICU for further management.      Neuro: post op acute pain  -MS: awake and alert, no focal deficit  - Multimodal pain control w/ tylenol ,dilaudid and oxy prn    Resp:  - Out of bed to chair, ambulate as tolerated, and incentive spirometry to prevent atelectasis      CVS: Post op hypotension  - Will wean vasopressor support w/ goal SBP >100 for perfusion    GI:   - diet as tolerated  - Bowel regimen with senna & Miralax    Renal: s/p DDRT for ESRD 2/2 DM  -has left arm fistula, recent HD  -post op hyperkalemia, shift, monitor  -follow up urine output strictly, NS @70cc/hr with 1/2NS for repletement UOP  - care per renal transplant regarding: cellcept, and tacro  - Monitor I&Os and electrolytes  - replete electrolytes as needed     Heme: stable h/h  - Monitor CBC and coags  -  VTE prophylaxis: hold  -SCD's    ID:   - Monitor for clinical evidence of active infection  - Monitor WBC, temperature, and procalcitonin  - Antibiotcis: ppx for renal tx    Endo: hx of DM,   - Hyperglycemia requring insulin drip and fs q1h  -        Lines/Tubes:  -right IJ and right radial A line, CXR to confirm  case d/w Dr James

## 2023-07-16 NOTE — OCCUPATIONAL THERAPY INITIAL EVALUATION ADULT - THERAPY FREQUENCY, OT EVAL
[FreeTextEntry1] : Impression: \par \par 11. Sick sinus syndrome: EKG performed today to assess for presence of conduction disease and reveals NSR. Review of past hospitalization with junctional bradycardia in setting of hyperkalemia. Has prior history of symptomatic bradycardia as well. Remains off AV nodals without symptomatic bradycardia/symptoms. Now off losartan with hopes to avoid recurrent hyperkalemia. Recommend 30 day CardioNet to assess for presence of conduction disease. If continues to have recurrent symptomatic bradycardia, consider PPM placement at that time. \par \par 2. Paroxysmal afib: not on AV nodals, symptoms of palpitations may be secondary to afib. Recommend 30 day CardioNet to assess for presence of recurrent afib. If symptomatic afib noted, consider ablation in future given inability to tolerate rate control management secondary to TBS unless PPM pursued. For now,  resume Eliquis for thromboembolic prophylaxis. \par \par 3. HTN: resume oral antihypertensives as prescribed. Encouraged heart healthy diet, sodium restriction, and weight loss. Continue regular f/u with Cardiologist for further HTN management.\par \par 4. HLD: resume statin therapy as prescribed and regular f/u with Cardiologist for routine lipid monitoring and management.\par \par 5. Hypothyroid: resume levothyroxine as prescribed and regular f/u with PCP for routine TFT monitoring and management.\par \par Plan for 30 day CardioNet.  1-2x/week

## 2023-07-16 NOTE — CONSULT NOTE ADULT - ASSESSMENT
Patient is a 53y Male  with hx of DM2 CAD, CHF, s/p CABG 2015, AICD (2016), on hemodialysis for approximately 6 years via L AVF (nephrologist Dr. Bharath Romo), He has hx of PVD, is s/p 4 stents in R leg (mid and distal right superficial femoral artery, right popliteal x2 on 1/14/22).  s/p  RT big toe and second toe amputation 2021, on asa and plavix for severe PVD (per pt).   Patient is s/p DDRT 7/16, requiring levophed drip and insulin drip post op with hyperkalemia, transferred to SICU         CVS: Post op hypotension  l wean vasopressor support w/ goal SBP >100 for perfusion    GI:   - diet as tolerated  - Bowel regimen w    Renal: s/p DDRT for ESRD 2/2 DM  -has left arm fistula, recent HD  -post op hyperkalemia,   fluids as per sicu  - care per renal transplant regarding: cellcept, and tacro  - Monitor I&Os and electrolytes  - replete electrolytes as needed     Heme: stable h/h  - Monitor CBC and coags  -  VTE prophylaxis: hold  -SCD's        Endo: hx of DM,   - Hyperglycemia requring insulin drip  fsg as per sicu  -

## 2023-07-17 DIAGNOSIS — E87.5 HYPERKALEMIA: ICD-10-CM

## 2023-07-17 LAB
ANION GAP SERPL CALC-SCNC: 16 MMOL/L — SIGNIFICANT CHANGE UP (ref 5–17)
ANION GAP SERPL CALC-SCNC: 17 MMOL/L — SIGNIFICANT CHANGE UP (ref 5–17)
ANION GAP SERPL CALC-SCNC: 19 MMOL/L — HIGH (ref 5–17)
ANION GAP SERPL CALC-SCNC: 19 MMOL/L — HIGH (ref 5–17)
ANION GAP SERPL CALC-SCNC: 20 MMOL/L — HIGH (ref 5–17)
ANION GAP SERPL CALC-SCNC: 21 MMOL/L — HIGH (ref 5–17)
APTT BLD: 27.3 SEC — LOW (ref 27.5–35.5)
BUN SERPL-MCNC: 33 MG/DL — HIGH (ref 7–23)
BUN SERPL-MCNC: 57 MG/DL — HIGH (ref 7–23)
BUN SERPL-MCNC: 58 MG/DL — HIGH (ref 7–23)
BUN SERPL-MCNC: 58 MG/DL — HIGH (ref 7–23)
BUN SERPL-MCNC: 61 MG/DL — HIGH (ref 7–23)
BUN SERPL-MCNC: 63 MG/DL — HIGH (ref 7–23)
CALCIUM SERPL-MCNC: 10 MG/DL — SIGNIFICANT CHANGE UP (ref 8.4–10.5)
CALCIUM SERPL-MCNC: 10.1 MG/DL — SIGNIFICANT CHANGE UP (ref 8.4–10.5)
CALCIUM SERPL-MCNC: 10.2 MG/DL — SIGNIFICANT CHANGE UP (ref 8.4–10.5)
CALCIUM SERPL-MCNC: 9.9 MG/DL — SIGNIFICANT CHANGE UP (ref 8.4–10.5)
CHLORIDE SERPL-SCNC: 100 MMOL/L — SIGNIFICANT CHANGE UP (ref 96–108)
CHLORIDE SERPL-SCNC: 102 MMOL/L — SIGNIFICANT CHANGE UP (ref 96–108)
CHLORIDE SERPL-SCNC: 97 MMOL/L — SIGNIFICANT CHANGE UP (ref 96–108)
CHLORIDE SERPL-SCNC: 99 MMOL/L — SIGNIFICANT CHANGE UP (ref 96–108)
CO2 SERPL-SCNC: 18 MMOL/L — LOW (ref 22–31)
CO2 SERPL-SCNC: 18 MMOL/L — LOW (ref 22–31)
CO2 SERPL-SCNC: 19 MMOL/L — LOW (ref 22–31)
CO2 SERPL-SCNC: 20 MMOL/L — LOW (ref 22–31)
CO2 SERPL-SCNC: 23 MMOL/L — SIGNIFICANT CHANGE UP (ref 22–31)
CO2 SERPL-SCNC: 23 MMOL/L — SIGNIFICANT CHANGE UP (ref 22–31)
CREAT SERPL-MCNC: 4.87 MG/DL — HIGH (ref 0.5–1.3)
CREAT SERPL-MCNC: 7.87 MG/DL — HIGH (ref 0.5–1.3)
CREAT SERPL-MCNC: 7.92 MG/DL — HIGH (ref 0.5–1.3)
CREAT SERPL-MCNC: 8.05 MG/DL — HIGH (ref 0.5–1.3)
CREAT SERPL-MCNC: 8.18 MG/DL — HIGH (ref 0.5–1.3)
CREAT SERPL-MCNC: 8.23 MG/DL — HIGH (ref 0.5–1.3)
EGFR: 14 ML/MIN/1.73M2 — LOW
EGFR: 7 ML/MIN/1.73M2 — LOW
EGFR: 8 ML/MIN/1.73M2 — LOW
EGFR: 8 ML/MIN/1.73M2 — LOW
GAS PNL BLDA: SIGNIFICANT CHANGE UP
GLUCOSE BLDC GLUCOMTR-MCNC: 109 MG/DL — HIGH (ref 70–99)
GLUCOSE BLDC GLUCOMTR-MCNC: 112 MG/DL — HIGH (ref 70–99)
GLUCOSE BLDC GLUCOMTR-MCNC: 113 MG/DL — HIGH (ref 70–99)
GLUCOSE BLDC GLUCOMTR-MCNC: 126 MG/DL — HIGH (ref 70–99)
GLUCOSE BLDC GLUCOMTR-MCNC: 129 MG/DL — HIGH (ref 70–99)
GLUCOSE BLDC GLUCOMTR-MCNC: 135 MG/DL — HIGH (ref 70–99)
GLUCOSE BLDC GLUCOMTR-MCNC: 148 MG/DL — HIGH (ref 70–99)
GLUCOSE BLDC GLUCOMTR-MCNC: 160 MG/DL — HIGH (ref 70–99)
GLUCOSE BLDC GLUCOMTR-MCNC: 182 MG/DL — HIGH (ref 70–99)
GLUCOSE BLDC GLUCOMTR-MCNC: 205 MG/DL — HIGH (ref 70–99)
GLUCOSE BLDC GLUCOMTR-MCNC: 210 MG/DL — HIGH (ref 70–99)
GLUCOSE BLDC GLUCOMTR-MCNC: 46 MG/DL — CRITICAL LOW (ref 70–99)
GLUCOSE BLDC GLUCOMTR-MCNC: 92 MG/DL — SIGNIFICANT CHANGE UP (ref 70–99)
GLUCOSE BLDC GLUCOMTR-MCNC: 92 MG/DL — SIGNIFICANT CHANGE UP (ref 70–99)
GLUCOSE BLDC GLUCOMTR-MCNC: 99 MG/DL — SIGNIFICANT CHANGE UP (ref 70–99)
GLUCOSE SERPL-MCNC: 100 MG/DL — HIGH (ref 70–99)
GLUCOSE SERPL-MCNC: 105 MG/DL — HIGH (ref 70–99)
GLUCOSE SERPL-MCNC: 105 MG/DL — HIGH (ref 70–99)
GLUCOSE SERPL-MCNC: 106 MG/DL — HIGH (ref 70–99)
GLUCOSE SERPL-MCNC: 130 MG/DL — HIGH (ref 70–99)
GLUCOSE SERPL-MCNC: 171 MG/DL — HIGH (ref 70–99)
HCT VFR BLD CALC: 25.2 % — LOW (ref 39–50)
HCT VFR BLD CALC: 27.4 % — LOW (ref 39–50)
HCT VFR BLD CALC: 30.1 % — LOW (ref 39–50)
HCV RNA SPEC NAA+PROBE-LOG IU: SIGNIFICANT CHANGE UP
HCV RNA SPEC NAA+PROBE-LOG IU: SIGNIFICANT CHANGE UP LOGIU/ML
HGB BLD-MCNC: 8.3 G/DL — LOW (ref 13–17)
HGB BLD-MCNC: 9.3 G/DL — LOW (ref 13–17)
HGB BLD-MCNC: 9.8 G/DL — LOW (ref 13–17)
INR BLD: 1.29 RATIO — HIGH (ref 0.88–1.16)
MAGNESIUM SERPL-MCNC: 2.2 MG/DL — SIGNIFICANT CHANGE UP (ref 1.6–2.6)
MAGNESIUM SERPL-MCNC: 2.4 MG/DL — SIGNIFICANT CHANGE UP (ref 1.6–2.6)
MAGNESIUM SERPL-MCNC: 2.4 MG/DL — SIGNIFICANT CHANGE UP (ref 1.6–2.6)
MAGNESIUM SERPL-MCNC: 2.5 MG/DL — SIGNIFICANT CHANGE UP (ref 1.6–2.6)
MCHC RBC-ENTMCNC: 30 PG — SIGNIFICANT CHANGE UP (ref 27–34)
MCHC RBC-ENTMCNC: 30.1 PG — SIGNIFICANT CHANGE UP (ref 27–34)
MCHC RBC-ENTMCNC: 30.2 PG — SIGNIFICANT CHANGE UP (ref 27–34)
MCHC RBC-ENTMCNC: 32.6 GM/DL — SIGNIFICANT CHANGE UP (ref 32–36)
MCHC RBC-ENTMCNC: 32.9 GM/DL — SIGNIFICANT CHANGE UP (ref 32–36)
MCHC RBC-ENTMCNC: 33.9 GM/DL — SIGNIFICANT CHANGE UP (ref 32–36)
MCV RBC AUTO: 89 FL — SIGNIFICANT CHANGE UP (ref 80–100)
MCV RBC AUTO: 91.3 FL — SIGNIFICANT CHANGE UP (ref 80–100)
MCV RBC AUTO: 92 FL — SIGNIFICANT CHANGE UP (ref 80–100)
NRBC # BLD: 0 /100 WBCS — SIGNIFICANT CHANGE UP (ref 0–0)
PHOSPHATE SERPL-MCNC: 5.5 MG/DL — HIGH (ref 2.5–4.5)
PHOSPHATE SERPL-MCNC: 6.8 MG/DL — HIGH (ref 2.5–4.5)
PHOSPHATE SERPL-MCNC: 7.2 MG/DL — HIGH (ref 2.5–4.5)
PHOSPHATE SERPL-MCNC: 7.2 MG/DL — HIGH (ref 2.5–4.5)
PHOSPHATE SERPL-MCNC: 7.3 MG/DL — HIGH (ref 2.5–4.5)
PHOSPHATE SERPL-MCNC: 7.7 MG/DL — HIGH (ref 2.5–4.5)
PLATELET # BLD AUTO: 214 K/UL — SIGNIFICANT CHANGE UP (ref 150–400)
PLATELET # BLD AUTO: 214 K/UL — SIGNIFICANT CHANGE UP (ref 150–400)
PLATELET # BLD AUTO: 220 K/UL — SIGNIFICANT CHANGE UP (ref 150–400)
POTASSIUM SERPL-MCNC: 4.5 MMOL/L — SIGNIFICANT CHANGE UP (ref 3.5–5.3)
POTASSIUM SERPL-MCNC: 5.1 MMOL/L — SIGNIFICANT CHANGE UP (ref 3.5–5.3)
POTASSIUM SERPL-MCNC: 5.4 MMOL/L — HIGH (ref 3.5–5.3)
POTASSIUM SERPL-MCNC: 5.4 MMOL/L — HIGH (ref 3.5–5.3)
POTASSIUM SERPL-MCNC: 5.6 MMOL/L — HIGH (ref 3.5–5.3)
POTASSIUM SERPL-MCNC: 5.8 MMOL/L — HIGH (ref 3.5–5.3)
POTASSIUM SERPL-SCNC: 4.5 MMOL/L — SIGNIFICANT CHANGE UP (ref 3.5–5.3)
POTASSIUM SERPL-SCNC: 5.1 MMOL/L — SIGNIFICANT CHANGE UP (ref 3.5–5.3)
POTASSIUM SERPL-SCNC: 5.4 MMOL/L — HIGH (ref 3.5–5.3)
POTASSIUM SERPL-SCNC: 5.4 MMOL/L — HIGH (ref 3.5–5.3)
POTASSIUM SERPL-SCNC: 5.6 MMOL/L — HIGH (ref 3.5–5.3)
POTASSIUM SERPL-SCNC: 5.8 MMOL/L — HIGH (ref 3.5–5.3)
PROTHROM AB SERPL-ACNC: 14.9 SEC — HIGH (ref 10.5–13.4)
RBC # BLD: 2.76 M/UL — LOW (ref 4.2–5.8)
RBC # BLD: 3.08 M/UL — LOW (ref 4.2–5.8)
RBC # BLD: 3.27 M/UL — LOW (ref 4.2–5.8)
RBC # FLD: 18.6 % — HIGH (ref 10.3–14.5)
RBC # FLD: 18.8 % — HIGH (ref 10.3–14.5)
RBC # FLD: 19 % — HIGH (ref 10.3–14.5)
SODIUM SERPL-SCNC: 137 MMOL/L — SIGNIFICANT CHANGE UP (ref 135–145)
SODIUM SERPL-SCNC: 137 MMOL/L — SIGNIFICANT CHANGE UP (ref 135–145)
SODIUM SERPL-SCNC: 138 MMOL/L — SIGNIFICANT CHANGE UP (ref 135–145)
SODIUM SERPL-SCNC: 138 MMOL/L — SIGNIFICANT CHANGE UP (ref 135–145)
SODIUM SERPL-SCNC: 139 MMOL/L — SIGNIFICANT CHANGE UP (ref 135–145)
SODIUM SERPL-SCNC: 140 MMOL/L — SIGNIFICANT CHANGE UP (ref 135–145)
TACROLIMUS SERPL-MCNC: 5.4 NG/ML — SIGNIFICANT CHANGE UP
WBC # BLD: 16.43 K/UL — HIGH (ref 3.8–10.5)
WBC # BLD: 17.32 K/UL — HIGH (ref 3.8–10.5)
WBC # BLD: 18.41 K/UL — HIGH (ref 3.8–10.5)
WBC # FLD AUTO: 16.43 K/UL — HIGH (ref 3.8–10.5)
WBC # FLD AUTO: 17.32 K/UL — HIGH (ref 3.8–10.5)
WBC # FLD AUTO: 18.41 K/UL — HIGH (ref 3.8–10.5)

## 2023-07-17 PROCEDURE — 76776 US EXAM K TRANSPL W/DOPPLER: CPT | Mod: 26,LT

## 2023-07-17 PROCEDURE — 99232 SBSQ HOSP IP/OBS MODERATE 35: CPT | Mod: GC,24

## 2023-07-17 PROCEDURE — 90935 HEMODIALYSIS ONE EVALUATION: CPT | Mod: GC

## 2023-07-17 RX ORDER — MIDODRINE HYDROCHLORIDE 2.5 MG/1
10 TABLET ORAL ONCE
Refills: 0 | Status: COMPLETED | OUTPATIENT
Start: 2023-07-17 | End: 2023-07-17

## 2023-07-17 RX ORDER — CALCIUM ACETATE 667 MG
667 TABLET ORAL
Refills: 0 | Status: DISCONTINUED | OUTPATIENT
Start: 2023-07-17 | End: 2023-07-18

## 2023-07-17 RX ORDER — ISOPROPYL ALCOHOL 70 ML/100ML
SWAB TOPICAL
Qty: 30 | Refills: 0 | Status: ACTIVE | COMMUNITY
Start: 2023-07-17 | End: 1900-01-01

## 2023-07-17 RX ORDER — INSULIN HUMAN 100 [IU]/ML
5 INJECTION, SOLUTION SUBCUTANEOUS ONCE
Refills: 0 | Status: COMPLETED | OUTPATIENT
Start: 2023-07-17 | End: 2023-07-17

## 2023-07-17 RX ORDER — BLOOD SUGAR DIAGNOSTIC
STRIP MISCELLANEOUS 4 TIMES DAILY
Qty: 360 | Refills: 3 | Status: ACTIVE | COMMUNITY
Start: 2023-07-17 | End: 1900-01-01

## 2023-07-17 RX ORDER — PEN NEEDLE, DIABETIC 29 G X1/2"
32G X 4 MM NEEDLE, DISPOSABLE MISCELLANEOUS
Qty: 2 | Refills: 3 | Status: ACTIVE | COMMUNITY
Start: 2023-07-17 | End: 1900-01-01

## 2023-07-17 RX ORDER — NOREPINEPHRINE BITARTRATE/D5W 8 MG/250ML
0.05 PLASTIC BAG, INJECTION (ML) INTRAVENOUS
Qty: 8 | Refills: 0 | Status: DISCONTINUED | OUTPATIENT
Start: 2023-07-17 | End: 2023-07-17

## 2023-07-17 RX ORDER — TRAMADOL HYDROCHLORIDE 50 MG/1
50 TABLET ORAL EVERY 6 HOURS
Refills: 0 | Status: DISCONTINUED | OUTPATIENT
Start: 2023-07-17 | End: 2023-07-24

## 2023-07-17 RX ORDER — FUROSEMIDE 40 MG
80 TABLET ORAL ONCE
Refills: 0 | Status: COMPLETED | OUTPATIENT
Start: 2023-07-17 | End: 2023-07-17

## 2023-07-17 RX ORDER — INSULIN LISPRO 100/ML
VIAL (ML) SUBCUTANEOUS
Refills: 0 | Status: DISCONTINUED | OUTPATIENT
Start: 2023-07-17 | End: 2023-07-24

## 2023-07-17 RX ORDER — BLOOD-GLUCOSE METER
W/DEVICE KIT MISCELLANEOUS
Qty: 1 | Refills: 0 | Status: ACTIVE | COMMUNITY
Start: 2023-07-17 | End: 1900-01-01

## 2023-07-17 RX ORDER — TRAMADOL HYDROCHLORIDE 50 MG/1
25 TABLET ORAL EVERY 6 HOURS
Refills: 0 | Status: DISCONTINUED | OUTPATIENT
Start: 2023-07-17 | End: 2023-07-24

## 2023-07-17 RX ORDER — ATORVASTATIN CALCIUM 80 MG/1
40 TABLET, FILM COATED ORAL AT BEDTIME
Refills: 0 | Status: DISCONTINUED | OUTPATIENT
Start: 2023-07-17 | End: 2023-07-24

## 2023-07-17 RX ORDER — TACROLIMUS 5 MG/1
8 CAPSULE ORAL ONCE
Refills: 0 | Status: COMPLETED | OUTPATIENT
Start: 2023-07-17 | End: 2023-07-17

## 2023-07-17 RX ORDER — POLYETHYLENE GLYCOL 3350 17 G/17G
17 POWDER, FOR SOLUTION ORAL DAILY
Refills: 0 | Status: DISCONTINUED | OUTPATIENT
Start: 2023-07-17 | End: 2023-07-18

## 2023-07-17 RX ORDER — TACROLIMUS 5 MG/1
8 CAPSULE ORAL
Refills: 0 | Status: DISCONTINUED | OUTPATIENT
Start: 2023-07-18 | End: 2023-07-18

## 2023-07-17 RX ORDER — SENNA PLUS 8.6 MG/1
2 TABLET ORAL AT BEDTIME
Refills: 0 | Status: DISCONTINUED | OUTPATIENT
Start: 2023-07-17 | End: 2023-07-24

## 2023-07-17 RX ORDER — ASPIRIN/CALCIUM CARB/MAGNESIUM 324 MG
81 TABLET ORAL DAILY
Refills: 0 | Status: DISCONTINUED | OUTPATIENT
Start: 2023-07-17 | End: 2023-07-24

## 2023-07-17 RX ORDER — DEXTROSE 50 % IN WATER 50 %
25 SYRINGE (ML) INTRAVENOUS ONCE
Refills: 0 | Status: COMPLETED | OUTPATIENT
Start: 2023-07-17 | End: 2023-07-17

## 2023-07-17 RX ADMIN — Medication 81 MILLIGRAM(S): at 09:03

## 2023-07-17 RX ADMIN — MIDODRINE HYDROCHLORIDE 10 MILLIGRAM(S): 2.5 TABLET ORAL at 22:13

## 2023-07-17 RX ADMIN — Medication 667 MILLIGRAM(S): at 22:13

## 2023-07-17 RX ADMIN — Medication 80 MILLIGRAM(S): at 09:11

## 2023-07-17 RX ADMIN — Medication 500000 UNIT(S): at 06:05

## 2023-07-17 RX ADMIN — SODIUM CHLORIDE 70 MILLILITER(S): 9 INJECTION INTRAMUSCULAR; INTRAVENOUS; SUBCUTANEOUS at 08:32

## 2023-07-17 RX ADMIN — MIDODRINE HYDROCHLORIDE 10 MILLIGRAM(S): 2.5 TABLET ORAL at 06:05

## 2023-07-17 RX ADMIN — Medication 975 MILLIGRAM(S): at 13:29

## 2023-07-17 RX ADMIN — FAMOTIDINE 20 MILLIGRAM(S): 10 INJECTION INTRAVENOUS at 12:57

## 2023-07-17 RX ADMIN — MIDODRINE HYDROCHLORIDE 10 MILLIGRAM(S): 2.5 TABLET ORAL at 17:10

## 2023-07-17 RX ADMIN — MYCOPHENOLATE MOFETIL 1 GRAM(S): 250 CAPSULE ORAL at 08:33

## 2023-07-17 RX ADMIN — INSULIN HUMAN 6 UNIT(S)/HR: 100 INJECTION, SOLUTION SUBCUTANEOUS at 08:33

## 2023-07-17 RX ADMIN — Medication 975 MILLIGRAM(S): at 12:59

## 2023-07-17 RX ADMIN — CHLORHEXIDINE GLUCONATE 1 APPLICATION(S): 213 SOLUTION TOPICAL at 06:06

## 2023-07-17 RX ADMIN — Medication 975 MILLIGRAM(S): at 00:21

## 2023-07-17 RX ADMIN — Medication 1 TABLET(S): at 12:58

## 2023-07-17 RX ADMIN — Medication 667 MILLIGRAM(S): at 12:57

## 2023-07-17 RX ADMIN — SODIUM CHLORIDE 70 MILLILITER(S): 9 INJECTION INTRAMUSCULAR; INTRAVENOUS; SUBCUTANEOUS at 06:00

## 2023-07-17 RX ADMIN — Medication 975 MILLIGRAM(S): at 23:19

## 2023-07-17 RX ADMIN — Medication 667 MILLIGRAM(S): at 16:35

## 2023-07-17 RX ADMIN — Medication 975 MILLIGRAM(S): at 07:00

## 2023-07-17 RX ADMIN — TACROLIMUS 8 MILLIGRAM(S): 5 CAPSULE ORAL at 12:58

## 2023-07-17 RX ADMIN — Medication 500000 UNIT(S): at 12:57

## 2023-07-17 RX ADMIN — Medication 9.06 MICROGRAM(S)/KG/MIN: at 05:59

## 2023-07-17 RX ADMIN — MIDODRINE HYDROCHLORIDE 10 MILLIGRAM(S): 2.5 TABLET ORAL at 15:06

## 2023-07-17 RX ADMIN — SENNA PLUS 2 TABLET(S): 8.6 TABLET ORAL at 22:13

## 2023-07-17 RX ADMIN — INSULIN HUMAN 5 UNIT(S): 100 INJECTION, SOLUTION SUBCUTANEOUS at 02:35

## 2023-07-17 RX ADMIN — Medication 500000 UNIT(S): at 17:21

## 2023-07-17 RX ADMIN — Medication 500000 UNIT(S): at 23:21

## 2023-07-17 RX ADMIN — Medication 125 MILLIGRAM(S): at 17:21

## 2023-07-17 RX ADMIN — MYCOPHENOLATE MOFETIL 1 GRAM(S): 250 CAPSULE ORAL at 20:58

## 2023-07-17 RX ADMIN — INSULIN HUMAN 6 UNIT(S)/HR: 100 INJECTION, SOLUTION SUBCUTANEOUS at 05:59

## 2023-07-17 RX ADMIN — Medication 125 MILLIGRAM(S): at 06:05

## 2023-07-17 RX ADMIN — Medication 25 MILLILITER(S): at 02:35

## 2023-07-17 RX ADMIN — VALGANCICLOVIR 450 MILLIGRAM(S): 450 TABLET, FILM COATED ORAL at 12:59

## 2023-07-17 RX ADMIN — ATORVASTATIN CALCIUM 40 MILLIGRAM(S): 80 TABLET, FILM COATED ORAL at 22:13

## 2023-07-17 RX ADMIN — Medication 975 MILLIGRAM(S): at 06:00

## 2023-07-17 NOTE — CONSULT NOTE ADULT - ASSESSMENT
52 yo M with hx of DM2 CAD, CHF, s/p CABG 2015, AICD (2016), on hemodialysis for approximately 6 years via L AVF (nephrologist Dr. Bharath Romo), He has hx of PVD, is s/p 4 stents in R leg (mid and distal right superficial femoral artery, right popliteal x2 on 1/14/22).; is sp RT big toe and second toe amputation 2021, on asa and plavix for severe PVD (per pt). Now here for possible DDRT. Pt reports does not make urine only few drops occasionally. Pt denies N/V/D, fevers/chills, CP, SOB. Pt reports last ate at 4pm today and had HD yesterday 7/14/23.  Nephrology consulted for ESRD s/p DDRT.      ESRD on HD [facility]  Nephrologist : Dr. William Chávez [facility]   Last HD 7/14/2023   s/p DDRT 7/16   HD consent in chart.   HD per Transplant team  Renal diet   Monitor     s/p DDRT @ [facility] 7/16/2023   Underwent L DDRT 7/13/2023 s/p  Simulect and solumedrol for induction.   Started on insulin drip intraoperatively for hyperkalemia after reperfusion and transferred to SICU   Monitor BMP, urine output   Pt oliguric ~ 180 cc urine output over 24 hrs   s/p Lasix 80 mg IVP today per Transplant   Management per Transplant team - no urgent indication for HD Currently.   Continue immunosuppressants per transplant : Envarsus (by trough), Cellcept 1 gm BID, and prednisone taper. Tacrolimus trough is below goal at 5.4 today. Recommend to continue with current regimen.  Check tacrolimus trough 30 minutes prior to AM dose, goal: 8-11.    Hyperkalemia   in setting of ESRD and recent DDRT with sub-optimally functioning allograft.   Serum potassium is mildly elevated at 5.5 today.  Pt. received  IV insulin/D50 and IV Lasix 80 mg   Mgmt per Transplant/ICU  Monitor     HTN:   BP optimal   Monitor     Hyperphosphatemia  Low PO4 diet  Continue Phoslo   Monitor PO4 daily 54 yo M with hx of DM2 CAD, CHF, s/p CABG 2015, AICD (2016), on hemodialysis for approximately 6 years via L AVF (nephrologist Dr. Bharath Romo), He has hx of PVD, is s/p 4 stents in R leg (mid and distal right superficial femoral artery, right popliteal x2 on 1/14/22).; is sp RT big toe and second toe amputation 2021, on asa and plavix for severe PVD (per pt). Now here for possible DDRT. Pt reports does not make urine only few drops occasionally. Pt denies N/V/D, fevers/chills, CP, SOB. Pt reports last ate at 4pm today and had HD yesterday 7/14/23.  Nephrology consulted for ESRD s/p DDRT.      ESRD on HD [facility]  Nephrologist : Dr. Thomas / DR Hooper at   [facility]   Last HD 7/14/2023   s/p DDRT 7/16   HD consent in chart.   HD per Transplant team  Renal diet   Monitor     s/p DDRT @ [facility] 7/16/2023   Underwent L DDRT 7/13/2023 s/p  Simulect and solumedrol for induction.   Started on insulin drip intraoperatively for hyperkalemia after reperfusion and transferred to SICU   Monitor BMP, urine output   Pt oliguric ~ 180 cc urine output over 24 hrs   s/p Lasix 80 mg IVP today per Transplant   Management per Transplant team - no urgent indication for HD Currently.   Continue immunosuppressants per transplant : Envarsus (by trough), Cellcept 1 gm BID, and prednisone taper. Tacrolimus trough is below goal at 5.4 today. Recommend to continue with current regimen.  Check tacrolimus trough 30 minutes prior to AM dose, goal: 8-11.    Hyperkalemia   in setting of ESRD and recent DDRT with sub-optimally functioning allograft.   Serum potassium is mildly elevated at 5.5 today.  Pt. received  IV insulin/D50 and IV Lasix 80 mg   Mgmt per Transplant/ICU  Monitor     HTN:   BP optimal   Monitor     Hyperphosphatemia  Low PO4 diet  Continue Phoslo   Monitor PO4 daily

## 2023-07-17 NOTE — DIETITIAN INITIAL EVALUATION ADULT - ADD RECOMMEND
1) Continue consistent carbohydrate liquid diet  2)  Medical team to advance diet when medically feasible via tolerated route. Consider advancing to Consistent Carbohydrate Renal  3) Reinforce post-transplant nutrition therapy and food safety guidelines in-house and prior to discharge.   4) Discharge diet: Continue as above. Recommend follow up visit with Transplant MD and outpatient RD for dietary modifications as warranted.  5) Monitor PO intake,  Urine Output, GI tolerance, skin integrity,and labs. RD remains available if needed, pt is aware.

## 2023-07-17 NOTE — CHART NOTE - NSCHARTNOTEFT_GEN_A_CORE
I have seen the patient and reviewed dialysis prescription and flow sheet.   Vital Signs Last 24 Hrs  T(C): 36.7 (07-17-23 @ 17:35)  T(F): 98 (07-17-23 @ 17:35), Max: 98.3 (07-16-23 @ 19:00)  HR: 76 (07-17-23 @ 18:00) (75 - 101)  BP: 147/65 (07-17-23 @ 18:00)  BP(mean): 94 (07-17-23 @ 18:00) (70 - 106)  RR: 17 (07-17-23 @ 18:00) (11 - 24)  SpO2: 99% (07-17-23 @ 18:00) (93% - 100%)  Wt(kg): --    07-16 @ 07:01  -  07-17 @ 07:00  --------------------------------------------------------  IN: 3259.2 mL / OUT: 545 mL / NET: 2714.2 mL    07-17 @ 07:01  -  07-17 @ 17:58  --------------------------------------------------------  IN: 1033 mL / OUT: 200 mL / NET: 833 mL    Dialysis access is functioning well.  Dialysis prescription has been adjusted for optimized control of volume status, uremia and electrolytes. Management of additional metabolic abnormalities/anemia will continue to be addressed on follow up.

## 2023-07-17 NOTE — CONSULT NOTE ADULT - ASSESSMENT
CAD s/p cabg  stable   asa, statin    chronic systolic chf  stable  improving LV function   resume BB once deemed safe   off ace/arb/arni for now   s/p ICD  interrogation noted     ESRD  s/p transplant   fu with transplant team

## 2023-07-17 NOTE — DIETITIAN INITIAL EVALUATION ADULT - PERTINENT MEDS FT
MEDICATIONS  (STANDING):  acetaminophen     Tablet .. 975 milliGRAM(s) Oral every 6 hours  aspirin  chewable 81 milliGRAM(s) Oral daily  atorvastatin 40 milliGRAM(s) Oral at bedtime  calcium acetate 667 milliGRAM(s) Oral four times a day with meals  chlorhexidine 4% Liquid 1 Application(s) Topical <User Schedule>  famotidine    Tablet 20 milliGRAM(s) Oral daily  insulin lispro (ADMELOG) corrective regimen sliding scale   SubCutaneous Before meals and at bedtime  methylPREDNISolone sodium succinate Injectable   IV Push   midodrine. 10 milliGRAM(s) Oral every 8 hours  mycophenolate mofetil 1 Gram(s) Oral <User Schedule>  nystatin    Suspension 304779 Unit(s) Swish and Swallow four times a day  polyethylene glycol 3350 17 Gram(s) Oral daily  senna 2 Tablet(s) Oral at bedtime  trimethoprim   80 mG/sulfamethoxazole 400 mG 1 Tablet(s) Oral daily  valGANciclovir 450 milliGRAM(s) Oral daily    MEDICATIONS  (PRN):  HYDROmorphone  Injectable 0.5 milliGRAM(s) IV Push every 3 hours PRN breakthrough pain  sodium chloride 0.9% lock flush 10 milliLiter(s) IV Push every 1 hour PRN Pre/post blood products, medications, blood draw, and to maintain line patency  traMADol 25 milliGRAM(s) Oral every 6 hours PRN Moderate Pain (4 - 6)  traMADol 50 milliGRAM(s) Oral every 6 hours PRN Severe Pain (7 - 10)

## 2023-07-17 NOTE — DIETITIAN INITIAL EVALUATION ADULT - ORAL INTAKE PTA/DIET HISTORY
Pt confirms no known food allergies/intolerances. Reports having a good appetite and PO intakes at home. Reports following a renal diet. Pt denies nausea, vomiting, diarrhea, or constipation. Denies difficulty chewing/swallowing. Reports taking a Multivitamin at home, and would receive either Nepro Shakes or Protein Bars at dialysis

## 2023-07-17 NOTE — DIETITIAN INITIAL EVALUATION ADULT - REASON INDICATOR FOR ASSESSMENT
Pt seen for post kidney transplant recipient nutrition evaluation per department protocol.   Information obtained from: Review of pt's current medical record, interview with pt in his assigned room on SICU

## 2023-07-17 NOTE — DIETITIAN INITIAL EVALUATION ADULT - OTHER CALCULATIONS
-- Defer fluid needs to medical team  -- Estimated calorie/protein needs are based on IBW of 160 pounds

## 2023-07-17 NOTE — PROGRESS NOTE ADULT - ASSESSMENT
Patient is a 53y Male  with hx of DM2 CAD, CHF, s/p CABG 2015, AICD (2016), on hemodialysis for approximately 6 years via L AVF (nephrologist Dr. Bharath Romo), He has hx of PVD, is s/p 4 stents in R leg (mid and distal right superficial femoral artery, right popliteal x2 on 1/14/22).  s/p  RT big toe and second toe amputation 2021, on asa and plavix for severe PVD (per pt).   Patient is s/p DDRT 7/16, requiring levophed drip and insulin drip post op with hyperkalemia, transferred to SICU         CVS: Post op hypotension  improved    - diet as tolerated  - Bowel regimen     Renal: s/p DDRT for ESRD 2/2 DM  -has left arm fistula,   -post op hyperkalemia,   fluids as per sicu/renal  - care per renal transplant regarding: cellcept, and tacro  - Monitor I&Os and electrolytes  - replete electrolytes as needed     Heme: stable h/h  - Monitor CBC and coags  -  VTE prophylaxis: hold  -SCD's        Endo: hx of DM,   - Hyperglycemia requring insulin drip  better  now fsg riss    steroid taper     -

## 2023-07-17 NOTE — DIETITIAN INITIAL EVALUATION ADULT - PERTINENT LABORATORY DATA
07-17    137  |  99  |  63<H>  ----------------------------<  106<H>  5.8<H>   |  18<L>  |  8.23<H>    Ca    10.1      17 Jul 2023 15:34  Phos  7.7     07-17  Mg     2.5     07-17    TPro  5.5<L>  /  Alb  3.0<L>  /  TBili  0.6  /  DBili  x   /  AST  30  /  ALT  12  /  AlkPhos  56  07-16    POCT Blood Glucose.: 113 mg/dL (07-17-23 @ 15:10)  POCT Blood Glucose.: 92 mg/dL (07-17-23 @ 11:04)  POCT Blood Glucose.: 92 mg/dL (07-17-23 @ 09:55)  POCT Blood Glucose.: 46 mg/dL (07-17-23 @ 09:54)  POCT Blood Glucose.: 99 mg/dL (07-17-23 @ 09:04)  POCT Blood Glucose.: 109 mg/dL (07-17-23 @ 08:20)  POCT Blood Glucose.: 129 mg/dL (07-17-23 @ 07:01)  POCT Blood Glucose.: 126 mg/dL (07-17-23 @ 06:04)  POCT Blood Glucose.: 135 mg/dL (07-17-23 @ 05:02)  POCT Blood Glucose.: 160 mg/dL (07-17-23 @ 04:05)  POCT Blood Glucose.: 205 mg/dL (07-17-23 @ 02:54)  POCT Blood Glucose.: 148 mg/dL (07-17-23 @ 02:06)  POCT Blood Glucose.: 182 mg/dL (07-17-23 @ 01:04)  POCT Blood Glucose.: 210 mg/dL (07-17-23 @ 00:02)  POCT Blood Glucose.: 170 mg/dL (07-16-23 @ 23:07)  POCT Blood Glucose.: 175 mg/dL (07-16-23 @ 21:56)  POCT Blood Glucose.: 191 mg/dL (07-16-23 @ 21:04)  POCT Blood Glucose.: 204 mg/dL (07-16-23 @ 19:52)  POCT Blood Glucose.: 187 mg/dL (07-16-23 @ 19:00)  POCT Blood Glucose.: 192 mg/dL (07-16-23 @ 18:21)    A1C with Estimated Average Glucose Result: 5.8 % (07-22-22 @ 04:43)

## 2023-07-17 NOTE — PROGRESS NOTE ADULT - SUBJECTIVE AND OBJECTIVE BOX
ROSA CONDE is a 53y Male s/p DDRT on 7/16/23. PMH is significant for DM2, CAD, CHF s/p CABG, AICD    Allergies    Contrast. (Unknown)    CMV +/+    Transplant Medications  Induction  -Basiliximab 20 mg POD 0 (given in OR) and POD 4  -Methyprednisolone taper (switch to PO prednisone on POD 4)            POD 0: 500 mg IV in OR            POD 1: 125 mg IV Q12H            POD 2: 60 mg IV Q12H            POD 3: 30 mg IV Q12H        Maintenance Immunosuppression  -Tacrolimus (Envarsus) 0.14 mg/kg daily (Adjust for goal trough: 8-10)  -Mycophenolate 1,000 mg PO Q12H  -Prednisone             POD 4: 20 mg PO Q12H            POD 5: 10 mg PO Q12H            POD 6: 5 mg PO Q12H            POD 7-: 5 mg daily     Anti-infection   -Bactrim SS tablet (frequency based on renal function)  -Valganciclovir (dose based on CMV serostatus and frequency based on renal function)  -Nystatin swish and swallow 5 mL four times daily    Surgical prophylaxis pre- and intra-operative dosing  -**Cefazolin  -**Vancomycin (PCN allergy)    Prophylaxis  -GI ppx: famotidine 20 mg daily  -Bowel ppx: senna  -DVT: sequential compression device  -Pain:            Mild: Acetaminophen 650 mg every 6 hours PRN           Moderate: Tramadol 25 mg every 4 hours PRN (adjust for renal function)           Severe: Tramadol 50 mg every 4 hours PRN (adjust for renal function)    Home Medications:  aspirin 81 mg oral delayed release tablet: 1 tab(s) orally once a day (16 Jul 2023 14:35)  atorvastatin 40 mg oral tablet: 1 tab(s) orally once a day (at bedtime) (16 Jul 2023 14:35)  betamethasone dipropionate 0.05% topical cream: Apply topically to affected area 2 times a day **Non-dialysis days** (16 Jul 2023 14:35)  carvedilol 6.25 mg oral tablet: 1 tab(s) orally every 12 hours (16 Jul 2023 14:35)  clotrimazole 1% topical cream: Apply topically to affected area 2 times a day.**Non-dialysis days** (16 Jul 2023 14:35)  lisinopril 2.5 mg oral tablet: 1 tab(s) orally once a day (16 Jul 2023 14:35)  Nanette-Dharmesh oral tablet: 1 tab(s) orally once a day (16 Jul 2023 14:35)  Vitamin C 500 mg oral tablet: 1 tab(s) orally once a day (16 Jul 2023 14:35)      Outpatient medication reconciliation reviewed and will be re-started appropriately.  Plan discussed with multidisciplinary team.

## 2023-07-17 NOTE — DIETITIAN INITIAL EVALUATION ADULT - EDUCATION DIETARY MODIFICATIONS
Provided education on post transplant nutrition therapy and food safety guidelines for transplant recipients. Discussed importance of thoroughly washing all fresh fruits/vegetables, importance of avoiding uncooked/raw/unpasteurized foods, avoiding pre-made deli/buffet/salad bar meals. Foods recommended as healthy well balanced diet and importance of adequate protein intakes for proper post-surgical healing discussed. Reviewed recommendations to avoid grapefruit, pomegranate and star fruit while taking immunosuppressant medication. Reviewed recommendations for moderate intake of sodium and carbohydrates with transplant medications. Reviewed effect of steroids on BG levels and importance of limiting concentrated sweets. Pt was receptive and expressed understanding.All questions answered./(1) partially meets; needs review/practice/verbalization

## 2023-07-17 NOTE — DIETITIAN INITIAL EVALUATION ADULT - LITERATURE/VIDEOS GIVEN
Provided nutrition package including: USDA Food Safety for Transplant Recipients booklet; Type 2 Diabetes Nutrition Therapy handout;   food safety and BG control handouts, fridge magnet with cooking temperatures, and RD information card.

## 2023-07-17 NOTE — PROGRESS NOTE ADULT - PROBLEM SELECTOR PLAN 3
Pt. with hyperkalemia in the setting of ESRD and recent DDRT with sub-optimally finction ing allograft. Serum potassium is mildly elevated at 5.5 today. Pt. received IV insulin/D50. Recommend IV lasix, as above. Monitor serum potassium.    If you have any questions, please feel free to contact me  Enrique Nagel  Nephrology Fellow  [phone] / [employer] Teams(Preferred)  (After 5pm or on weekends please page the on-call fellow)

## 2023-07-17 NOTE — DIETITIAN INITIAL EVALUATION ADULT - REASON FOR ADMISSION
Chart Reviewed, Events Noted  "53y Male  with hx of DM2 CAD, CHF, s/p CABG 2015, AICD (2016), on hemodialysis for approximately 6 years via L AVF (nephrologist Dr. Bharath Romo), He has hx of PVD, is s/p 4 stents in R leg (mid and distal right superficial femoral artery, right popliteal x2 on 1/14/22).  s/p  RT big toe and second toe amputation 2021, on asa and plavix for severe PVD (per pt). Patient is s/p DDRT 7/16, requiring levophed drip and insulin drip post op with hyperkalemia, transferred to SICU."

## 2023-07-17 NOTE — CONSULT NOTE ADULT - SUBJECTIVE AND OBJECTIVE BOX
[facility]   MD DANIEL WALDEN MD BRIANA PETITO, NP    TEL:  FROM 9 AM to 5 PM ---OFFICE: [phone]  FROM 5 PM - 9 AM PLEASE CALL ANSWERING SERVICE: [phone]     RENAL INITIAL CONSULT NOTE: DATE OF SERVICE 07-17-23 @ 16:43    HPI:  52 yo M with hx of DM2 CAD, CHF, s/p CABG 2015, AICD (2016), on hemodialysis for approximately 6 years via L AVF (nephrologist Dr. Bharath Romo), He has hx of PVD, is s/p 4 stents in R leg (mid and distal right superficial femoral artery, right popliteal x2 on 1/14/22).; is sp RT big toe and second toe amputation 2021, on asa and plavix for severe PVD (per pt). Now here for possible DDRT. Pt reports does not make urine only few drops occasionally. Pt denies N/V/D, fevers/chills, CP, SOB. Pt reports last ate at 4pm today and had HD yesterday 7/14/23.  Nephrology consulted for ESRD s/p DDRT.        Home meds:  -Atorvastatin 40mg QD  -ASA81  -Januvia 100mg QD  -Coreg 6.25 BID  -Lisinopril 2.5 QD  -Nanette-bridger 1 tab QD  -Plavix 75 mg QD   (15 Jul 2023 19:08)      Allergies:  Contrast. (Unknown)      PAST MEDICAL & SURGICAL HISTORY:  Diabetes mellitus  type 2      Foot ulcer due to secondary DM      Coronary artery disease      Acute on chronic systolic heart failure      Breast Reduction  at age 18      Toe amputation status, left      S/P CABG (coronary artery bypass graft)      AICD (automatic cardioverter/defibrillator) present          Home Medications Reviewed    Hospital Medications:   MEDICATIONS  (STANDING):  acetaminophen     Tablet .. 975 milliGRAM(s) Oral every 6 hours  aspirin  chewable 81 milliGRAM(s) Oral daily  atorvastatin 40 milliGRAM(s) Oral at bedtime  calcium acetate 667 milliGRAM(s) Oral four times a day with meals  chlorhexidine 4% Liquid 1 Application(s) Topical <User Schedule>  famotidine    Tablet 20 milliGRAM(s) Oral daily  insulin lispro (ADMELOG) corrective regimen sliding scale   SubCutaneous Before meals and at bedtime  methylPREDNISolone sodium succinate Injectable 125 milliGRAM(s) IV Push two times a day  methylPREDNISolone sodium succinate Injectable   IV Push   midodrine. 10 milliGRAM(s) Oral every 8 hours  midodrine. 10 milliGRAM(s) Oral once  mycophenolate mofetil 1 Gram(s) Oral <User Schedule>  nystatin    Suspension 318782 Unit(s) Swish and Swallow four times a day  polyethylene glycol 3350 17 Gram(s) Oral daily  senna 2 Tablet(s) Oral at bedtime  trimethoprim   80 mG/sulfamethoxazole 400 mG 1 Tablet(s) Oral daily  valGANciclovir 450 milliGRAM(s) Oral daily      SOCIAL HISTORY:  Denies ETOh, Smoking,     FAMILY HISTORY:  Family history of diabetes mellitus (Father)        REVIEW OF SYSTEMS:  CONSTITUTIONAL: No weakness, fevers or chills  EYES/ENT: No visual changes;  No vertigo or throat pain   NECK: No pain or stiffness  RESPIRATORY: No cough, wheezing, hemoptysis; No shortness of breath  CARDIOVASCULAR: No chest pain or palpitations.  GASTROINTESTINAL: No abdominal or epigastric pain. No nausea, vomiting, or hematemesis; No diarrhea or constipation. No melena or hematochezia.  GENITOURINARY: SEE HPI   NEUROLOGICAL: No numbness or weakness  SKIN: No itching, burning, rashes, or lesions   VASCULAR: No bilateral lower extremity edema.   All other review of systems is negative unless indicated above.    VITALS:  T(F): 97.3 (07-17-23 @ 15:00), Max: 98.3 (07-16-23 @ 19:00)  HR: 80 (07-17-23 @ 15:00)  BP: 124/59 (07-17-23 @ 15:00)  RR: 19 (07-17-23 @ 15:00)  SpO2: 98% (07-17-23 @ 15:00)  Wt(kg): --    07-16 @ 07:01  -  07-17 @ 07:00  --------------------------------------------------------  IN: 3259.2 mL / OUT: 545 mL / NET: 2714.2 mL    07-17 @ 07:01 - 07-17 @ 16:43  --------------------------------------------------------  IN: 933 mL / OUT: 140 mL / NET: 793 mL      Pt seen and examined at bedside in ICU.    PHYSICAL EXAM:  Constitutional: NAD  HEENT: anicteric sclera, oropharynx clear, MMM  Neck: No JVD  Respiratory: CTAB, no wheezes, rales or rhonchi  Cardiovascular: S1, S2, RRR  Gastrointestinal: BS+, soft, NT/ ND. LIANA drain in place. s/p Surgical incision dressing c/d/i.   Genitourinary Granda   Extremities: No cyanosis or clubbing. No peripheral edema  Neurological: A/O x 3, no focal deficits  Psychiatric: Normal mood, normal affect  : No CVA tenderness. No granda.   Skin: No rashes  Vascular Access: L AVF     LABS:  07-17    137  |  99  |  63<H>  ----------------------------<  106<H>  5.8<H>   |  18<L>  |  8.23<H>    Ca    10.1      17 Jul 2023 15:34  Phos  7.7     07-17  Mg     2.5     07-17    TPro  5.5<L>  /  Alb  3.0<L>  /  TBili  0.6  /  DBili      /  AST  30  /  ALT  12  /  AlkPhos  56  07-16    Creatinine Trend: 8.23 <--, 8.18 <--, 7.87 <--, 7.92 <--, 8.05 <--, 7.73 <--, 6.97 <--, 7.03 <--, 6.55 <--, 7.14 <--                        9.8    17.32 )-----------( 220      ( 17 Jul 2023 08:32 )             30.1     Urine Studies:  Urinalysis Basic - ( 17 Jul 2023 15:34 )    Color:  / Appearance:  / SG:  / pH:   Gluc: 106 mg/dL / Ketone:   / Bili:  / Urobili:    Blood:  / Protein:  / Nitrite:    Leuk Esterase:  / RBC:  / WBC    Sq Epi:  / Non Sq Epi:  / Bacteria:           RADIOLOGY & ADDITIONAL STUDIES:

## 2023-07-17 NOTE — PROGRESS NOTE ADULT - SUBJECTIVE AND OBJECTIVE BOX
DATE OF SERVICE: 07-17-23 @ 14:28  CHIEF COMPLAINT:Patient is a 53y old  Male who presents with a chief complaint of Possible DDRT (17 Jul 2023 11:46)    	        PAST MEDICAL & SURGICAL HISTORY:  Diabetes mellitus  type 2      Foot ulcer due to secondary DM      Coronary artery disease      Acute on chronic systolic heart failure      Breast Reduction  at age 18      Toe amputation status, left      S/P CABG (coronary artery bypass graft)      AICD (automatic cardioverter/defibrillator) present              REVIEW OF SYSTEMS:    RESPIRATORY: No cough, wheezing, chills or hemoptysis; No Shortness of Breath  CARDIOVASCULAR: No chest pain, palpitations, passing out,   GASTROINTESTINAL:less abd pain    NEUROLOGICAL: No headaches,     Medications:  MEDICATIONS  (STANDING):  acetaminophen     Tablet .. 975 milliGRAM(s) Oral every 6 hours  aspirin  chewable 81 milliGRAM(s) Oral daily  atorvastatin 40 milliGRAM(s) Oral at bedtime  calcium acetate 667 milliGRAM(s) Oral four times a day with meals  chlorhexidine 4% Liquid 1 Application(s) Topical <User Schedule>  famotidine    Tablet 20 milliGRAM(s) Oral daily  insulin lispro (ADMELOG) corrective regimen sliding scale   SubCutaneous Before meals and at bedtime  methylPREDNISolone sodium succinate Injectable 125 milliGRAM(s) IV Push two times a day  methylPREDNISolone sodium succinate Injectable   IV Push   midodrine. 10 milliGRAM(s) Oral every 8 hours  midodrine. 10 milliGRAM(s) Oral once  mycophenolate mofetil 1 Gram(s) Oral <User Schedule>  nystatin    Suspension 177790 Unit(s) Swish and Swallow four times a day  polyethylene glycol 3350 17 Gram(s) Oral daily  senna 2 Tablet(s) Oral at bedtime  trimethoprim   80 mG/sulfamethoxazole 400 mG 1 Tablet(s) Oral daily  valGANciclovir 450 milliGRAM(s) Oral daily    MEDICATIONS  (PRN):  HYDROmorphone  Injectable 0.5 milliGRAM(s) IV Push every 3 hours PRN breakthrough pain  sodium chloride 0.9% lock flush 10 milliLiter(s) IV Push every 1 hour PRN Pre/post blood products, medications, blood draw, and to maintain line patency  traMADol 25 milliGRAM(s) Oral every 6 hours PRN Moderate Pain (4 - 6)  traMADol 50 milliGRAM(s) Oral every 6 hours PRN Severe Pain (7 - 10)    	    PHYSICAL EXAM:  T(C): 36.3 (07-17-23 @ 11:00), Max: 36.9 (07-16-23 @ 15:00)  HR: 81 (07-17-23 @ 13:00) (75 - 101)  BP: 120/53 (07-17-23 @ 13:00) (100/51 - 166/74)  RR: 19 (07-17-23 @ 13:00) (11 - 26)  SpO2: 98% (07-17-23 @ 13:00) (93% - 100%)  Wt(kg): --  I&O's Summary    16 Jul 2023 07:01  -  17 Jul 2023 07:00  --------------------------------------------------------  IN: 3259.2 mL / OUT: 545 mL / NET: 2714.2 mL    17 Jul 2023 07:01  -  17 Jul 2023 14:28  --------------------------------------------------------  IN: 833 mL / OUT: 130 mL / NET: 703 mL        HEENT:   Normal oral mucosa,   Cardiovascular: Normal S1 S2, No JVD,  Respiratory: Lungs clear to auscultation	  Psychiatry: A & O   Gastrointestinal:  Soft, Non-tender, + BS	    Neurologic: Non-focal  Extremities: Normal range of motion,     TELEMETRY: 	    ECG:  	  RADIOLOGY:  OTHER: 	  	  LABS:	 	    CARDIAC MARKERS:                                9.8    17.32 )-----------( 220      ( 17 Jul 2023 08:32 )             30.1     07-17    138  |  99  |  61<H>  ----------------------------<  100<H>  5.4<H>   |  18<L>  |  8.18<H>    Ca    10.0      17 Jul 2023 10:38  Phos  7.2     07-17  Mg     2.4     07-17    TPro  5.5<L>  /  Alb  3.0<L>  /  TBili  0.6  /  DBili  x   /  AST  30  /  ALT  12  /  AlkPhos  56  07-16    proBNP:   Lipid Profile:   HgA1c:   TSH:

## 2023-07-17 NOTE — CONSULT NOTE ADULT - SUBJECTIVE AND OBJECTIVE BOX
CHIEF COMPLAINT:Patient is a 53y old  Male who presents with a chief complaint of Possible DDRT (17 Jul 2023 01:49)      HISTORY OF PRESENT ILLNESS:    53 male with history as below known to me from office is now s/p kidney transplant  denies any chest pain, sob, palpitation, dizziness or syncope.     PAST MEDICAL & SURGICAL HISTORY:  Diabetes mellitus  type 2      Foot ulcer due to secondary DM      Coronary artery disease      Acute on chronic systolic heart failure      Breast Reduction  at age 18      Toe amputation status, left      S/P CABG (coronary artery bypass graft)      AICD (automatic cardioverter/defibrillator) present              MEDICATIONS:  aspirin  chewable 81 milliGRAM(s) Oral daily  midodrine. 10 milliGRAM(s) Oral every 8 hours    nystatin    Suspension 094675 Unit(s) Swish and Swallow four times a day  trimethoprim   80 mG/sulfamethoxazole 400 mG 1 Tablet(s) Oral daily  valGANciclovir 450 milliGRAM(s) Oral daily      acetaminophen     Tablet .. 975 milliGRAM(s) Oral every 6 hours  HYDROmorphone  Injectable 0.5 milliGRAM(s) IV Push every 3 hours PRN  oxyCODONE    IR 2.5 milliGRAM(s) Oral every 4 hours PRN  oxyCODONE    IR 5 milliGRAM(s) Oral every 4 hours PRN    famotidine    Tablet 20 milliGRAM(s) Oral daily    atorvastatin 40 milliGRAM(s) Oral at bedtime  insulin lispro (ADMELOG) corrective regimen sliding scale   SubCutaneous Before meals and at bedtime  methylPREDNISolone sodium succinate Injectable 125 milliGRAM(s) IV Push two times a day  methylPREDNISolone sodium succinate Injectable   IV Push     calcium acetate 667 milliGRAM(s) Oral four times a day with meals  chlorhexidine 4% Liquid 1 Application(s) Topical <User Schedule>  mycophenolate mofetil 1 Gram(s) Oral <User Schedule>  sodium chloride 0.9% lock flush 10 milliLiter(s) IV Push every 1 hour PRN  sodium chloride 0.9%. 1000 milliLiter(s) IV Continuous <Continuous>      FAMILY HISTORY:  Family history of diabetes mellitus (Father)        Non-contributory    SOCIAL HISTORY:    No tobacco, drugs or etoh    Allergies    Contrast. (Unknown)    Intolerances    	    REVIEW OF SYSTEMS:  as above  The rest of the 14 points ROS reviewed and except above they are unremarkable.        PHYSICAL EXAM:  T(C): 36.7 (07-17-23 @ 03:00), Max: 36.9 (07-16-23 @ 15:00)  HR: 82 (07-17-23 @ 07:00) (76 - 108)  BP: 139/62 (07-17-23 @ 07:00) (100/51 - 166/74)  RR: 21 (07-17-23 @ 07:00) (11 - 39)  SpO2: 97% (07-17-23 @ 07:00) (93% - 100%)  Wt(kg): --  I&O's Summary    16 Jul 2023 07:01  -  17 Jul 2023 07:00  --------------------------------------------------------  IN: 3259.2 mL / OUT: 540 mL / NET: 2719.2 mL      JVP: Normal  Neck: supple  Lung: clear   CV: S1 S2 , Murmur:  Abd: soft  Ext: No edema  neuro: Awake / alert  Psych: flat affect  Skin: normal      LABS/DATA:    TELEMETRY: 	    ECG:  	   	  CARDIAC MARKERS:                                      9.8    17.32 )-----------( 220      ( 17 Jul 2023 08:32 )             30.1     07-17    140  |  102  |  58<H>  ----------------------------<  105<H>  5.4<H>   |  19<L>  |  7.87<H>    Ca    9.9      17 Jul 2023 08:32  Phos  7.2     07-17  Mg     2.5     07-17    TPro  5.5<L>  /  Alb  3.0<L>  /  TBili  0.6  /  DBili  x   /  AST  30  /  ALT  12  /  AlkPhos  56  07-16    proBNP:   Lipid Profile:   HgA1c:   TSH:

## 2023-07-17 NOTE — PROGRESS NOTE ADULT - PROBLEM SELECTOR PLAN 1
Pt. with h/o ESRD on HD. Underwent DDRT on 7/16/23. Scr increased to 8.18 today. Pt. is oliguric, documented urine output is 180 cc over the past 24 hours. Pt. clinically stable. Labs reviewed. No urgent indication for HD currently. Recommend IV Lasix 80 mg BID today as pt. remains oliguric. Continue with immunosuppression, as outlined below. Monitor labs and urine output. Avoid nephrotoxins. Dose medications as per eGFR.

## 2023-07-17 NOTE — PROGRESS NOTE ADULT - SUBJECTIVE AND OBJECTIVE BOX
HISTORY  Patient is a 53y Male  with hx of DM2 CAD, CHF, s/p CABG 2015, AICD (2016), on hemodialysis for approximately 6 years via L AVF (nephrologist Dr. Bharath Romo), He has hx of PVD, is s/p 4 stents in R leg (mid and distal right superficial femoral artery, right popliteal x2 on 1/14/22).  s/p  RT big toe and second toe amputation 2021, on asa and plavix for severe PVD (per pt).  Patient is s/p DDRT 7/16, requiring levophed drip and insulin drip post op with hyperkalemia, transferred to SICU for further management.    24 HOUR EVENTS:  -Hyperkalemic, requiring potassium shifts and lokelma    Meds: acetaminophen     Tablet .. 975 milliGRAM(s) Oral every 6 hours  HYDROmorphone  Injectable 0.5 milliGRAM(s) IV Push every 3 hours PRN breakthrough pain  oxyCODONE    IR 2.5 milliGRAM(s) Oral every 4 hours PRN Moderate Pain (4 - 6)  oxyCODONE    IR 5 milliGRAM(s) Oral every 4 hours PRN Severe Pain (7 - 10)    RESPIRATORY  RR: 14 (07-17-23 @ 01:00) (12 - 39)  SpO2: 97% (07-17-23 @ 01:00) (93% - 100%)  Exam: unlabored, clear to auscultation bilaterally  Mechanical Ventilation:   ABG - ( 17 Jul 2023 01:31 )  pH: 7.38  /  pCO2: 40    /  pO2: 94    / HCO3: 24    / Base Excess: -1.3  /  SaO2: 97.1      CARDIOVASCULAR  HR: 77 (07-17-23 @ 01:00) (77 - 108)  BP: 124/60 (07-17-23 @ 01:00) (100/51 - 166/74)  BP(mean): 86 (07-17-23 @ 01:00) (70 - 106)  ABP: 129/49 (07-17-23 @ 00:00) (89/47 - 162/62)  ABP(mean): 75 (07-17-23 @ 00:00) (57 - 93)  VBG - ( 15 Jul 2023 21:20 )  pH: 7.42  /  pCO2: 48    /  pO2: 27    / HCO3: 31    / Base Excess: 5.8   /  SaO2: 44.8   Lactate: 1.7      Meds: midodrine. 10 milliGRAM(s) Oral every 8 hours    I&O's Digna    07-15 @ 07:01  -  07-16 @ 07:00  --------------------------------------------------------  IN:    Insulin: 6 mL    Norepinephrine: 18.1 mL    sodium chloride 0.9%: 70 mL  Total IN: 94.1 mL    OUT:    Indwelling Catheter - Urethral (mL): 10 mL  Total OUT: 10 mL    Total NET: 84.1 mL    07-16 @ 07:01  -  07-17 @ 01:49  --------------------------------------------------------  IN:    Insulin: 92 mL    IV PiggyBack: 100 mL    Norepinephrine: 74.3 mL    Oral Fluid: 500 mL    sodium chloride 0.9%: 1330 mL  Total IN: 2096.3 mL    OUT:    Bulb (mL): 320 mL    Indwelling Catheter - Urethral (mL): 170 mL  Total OUT: 490 mL    Total NET: 1606.3 mL    07-16    137  |  98  |  55<H>  ----------------------------<  189<H>  6.0<H>   |  22  |  7.73<H>    Ca    10.1      16 Jul 2023 21:17  Phos  5.8     07-16  Mg     2.3     07-16    TPro  5.5<L>  /  Alb  3.0<L>  /  TBili  0.6  /  DBili  x   /  AST  30  /  ALT  12  /  AlkPhos  56  07-16    Meds: sodium chloride 0.45%. 500 milliLiter(s) IV Continuous <Continuous>  sodium chloride 0.9% lock flush 10 milliLiter(s) IV Push every 1 hour PRN Pre/post blood products, medications, blood draw, and to maintain line patency  sodium chloride 0.9%. 1000 milliLiter(s) IV Continuous <Continuous>    HEMATOLOGIC  Meds:                         9.0    15.34 )-----------( 214      ( 16 Jul 2023 21:17 )             26.7     PT/INR - ( 16 Jul 2023 10:05 )   PT: 14.0 sec;   INR: 1.21 ratio         PTT - ( 16 Jul 2023 10:05 )  PTT:26.1 sec  Transfusion     [ ] PRBC   [ ] Platelets   [ ] FFP   [ ] Cryoprecipitate      INFECTIOUS DISEASES  WBC Count: 15.34 K/uL (07-16 @ 21:17)  WBC Count: 28.88 K/uL (07-16 @ 10:05)  WBC Count: 15.78 K/uL (07-16 @ 07:28)    RECENT CULTURES:    Meds: mycophenolate mofetil 1 Gram(s) Oral <User Schedule>  nystatin    Suspension 862276 Unit(s) Swish and Swallow four times a day  trimethoprim   80 mG/sulfamethoxazole 400 mG 1 Tablet(s) Oral daily  valGANciclovir 450 milliGRAM(s) Oral daily    ENDOCRINE  CAPILLARY BLOOD GLUCOSE    POCT Blood Glucose.: 182 mg/dL (17 Jul 2023 01:04)  POCT Blood Glucose.: 210 mg/dL (17 Jul 2023 00:02)  POCT Blood Glucose.: 170 mg/dL (16 Jul 2023 23:07)  POCT Blood Glucose.: 175 mg/dL (16 Jul 2023 21:56)  POCT Blood Glucose.: 191 mg/dL (16 Jul 2023 21:04)  POCT Blood Glucose.: 204 mg/dL (16 Jul 2023 19:52)  POCT Blood Glucose.: 187 mg/dL (16 Jul 2023 19:00)  POCT Blood Glucose.: 192 mg/dL (16 Jul 2023 18:21)  POCT Blood Glucose.: 159 mg/dL (16 Jul 2023 17:20)  POCT Blood Glucose.: 218 mg/dL (16 Jul 2023 16:18)  POCT Blood Glucose.: 139 mg/dL (16 Jul 2023 15:15)  POCT Blood Glucose.: 145 mg/dL (16 Jul 2023 13:58)  POCT Blood Glucose.: 136 mg/dL (16 Jul 2023 13:05)  POCT Blood Glucose.: 114 mg/dL (16 Jul 2023 12:01)  POCT Blood Glucose.: 163 mg/dL (16 Jul 2023 09:56)  POCT Blood Glucose.: 159 mg/dL (16 Jul 2023 08:58)  POCT Blood Glucose.: 200 mg/dL (16 Jul 2023 08:09)    Meds: insulin regular Infusion 6 Unit(s)/Hr IV Continuous <Continuous>  methylPREDNISolone sodium succinate Injectable 125 milliGRAM(s) IV Push two times a day  methylPREDNISolone sodium succinate Injectable   IV Push     OTHER MEDICATIONS:  chlorhexidine 4% Liquid 1 Application(s) Topical <User Schedule>

## 2023-07-17 NOTE — PROGRESS NOTE ADULT - SUBJECTIVE AND OBJECTIVE BOX
52 yo M with hx of DM2 CAD, CHF, s/p CABG 2015, AICD (2016), on hemodialysis for approximately 6 years via L AVF (nephrologist Dr. Bharath Romo), He has hx of PVD, is s/p 4 stents in R leg (mid and distal right superficial femoral artery, right popliteal x2 on 1/14/22).; is sp RT big toe and second toe amputation 2021, on asa and plavix for severe PVD (per pt).     Now here for DDRT. Pt reports dose not make urine only few drops occasionally. Pt denies N/V/D, fevers/chills, CP, SOB. Pt reports last ate at 4pm today and had HD yesterday 7/14/23.      Underwent L DDRT with CIT 16hrs, 1A,1V,1U. Started on insulin drip intraoperatively for hyperkalemia after reperfusion. He was started on pressors (Levo - 0.04) intraoperatively and received 2U PRBC. Got Simulect and solumedrol for induction.     Interval events:   POD#1, remains in SICU with improved BP and weaned off pressors since 4 am.   Got 1 unit of PRBC this morning with good response from 8.3 to 9.8  Remains on pressors - 0.08 levophed for maintaining MAP  Urine output 0-10cc hr. 175cc in 24 hrs.   LIANA output remains serosanguinous, 365 cc in 24 hrs  Graft ultrasound - well perfused kidney with no obvious renal artery stenosis. High normal velocities. Repeat US this morning with lower resistive index and small perinephric collection, no hydronephrosis.   ECHOcardiogram without major abnormalities.   Liberated from insulin gtt, on ISS   Potassium this morning 5.1 and repeat 5.4  No need for HD at this moment.   Aspirin restarted      Vital Signs Last 24 Hrs  T(C): 36.1 (17 Jul 2023 08:00), Max: 36.9 (16 Jul 2023 15:00)  T(F): 97 (17 Jul 2023 08:00), Max: 98.4 (16 Jul 2023 15:00)  HR: 88 (17 Jul 2023 10:00) (76 - 108)  BP: 164/73 (17 Jul 2023 10:00) (100/51 - 166/74)  BP(mean): 105 (17 Jul 2023 10:00) (70 - 106)  RR: 19 (17 Jul 2023 10:00) (11 - 39)  SpO2: 99% (17 Jul 2023 10:00) (93% - 100%)    Parameters below as of 17 Jul 2023 08:00  Patient On (Oxygen Delivery Method): room air      I&O's Detail    16 Jul 2023 07:01  -  17 Jul 2023 07:00  --------------------------------------------------------  IN:    Insulin: 121.3 mL    IV PiggyBack: 100 mL    Norepinephrine: 74.3 mL    Norepinephrine: 3.6 mL    Oral Fluid: 980 mL    PRBCs (Packed Red Blood Cells): 300 mL    sodium chloride 0.9%: 1680 mL  Total IN: 3259.2 mL    OUT:    Bulb (mL): 365 mL    Indwelling Catheter - Urethral (mL): 180 mL  Total OUT: 545 mL    Total NET: 2714.2 mL      17 Jul 2023 07:01  -  17 Jul 2023 10:05  --------------------------------------------------------  IN:    Insulin: 3 mL    Oral Fluid: 250 mL    sodium chloride 0.9%: 210 mL  Total IN: 463 mL    OUT:    Bulb (mL): 40 mL    Indwelling Catheter - Urethral (mL): 20 mL  Total OUT: 60 mL    Total NET: 403 mL                            9.8    17.32 )-----------( 220      ( 17 Jul 2023 08:32 )             30.1     07-17    140  |  102  |  58<H>  ----------------------------<  105<H>  5.4<H>   |  19<L>  |  7.87<H>    Ca    9.9      17 Jul 2023 08:32  Phos  7.2     07-17  Mg     2.5     07-17    TPro  5.5<L>  /  Alb  3.0<L>  /  TBili  0.6  /  DBili  x   /  AST  30  /  ALT  12  /  AlkPhos  56  07-16

## 2023-07-17 NOTE — PROGRESS NOTE ADULT - ASSESSMENT
52 yo M with hx of DM2 CAD, CHF, s/p CABG 2015, AICD (2016), on hemodialysis for approximately 6 years via L AVF (nephrologist Dr. Bharath Romo), He has hx of PVD, is s/p 4 stents in R leg (mid and distal right superficial femoral artery, right popliteal x2 on 1/14/22).; is sp RT big toe and second toe amputation 2021, on asa and plavix for severe PVD (per pt).     Now here for DDRT. Pt reports dose not make urine only few drops occasionally. Pt denies N/V/D, fevers/chills, CP, SOB. Pt reports last ate at 4pm today and had HD yesterday 7/14/23.      Underwent L DDRT with CIT 16hrs, 1A,1V,1U. Started on insulin drip intraoperatively for hyperkalemia after reperfusion. He was started on pressors (Levo - 0.004) intraoperatively and received 2U PRBC. Got Simulect and solumedrol for induction.     Plan:    POD#1- L DDRT  Maintain Blood pressure with SBP goal >110, Liberated off pressors now, a-line likely can come out  Monitor urine output, creatinine  Renal US without major changes, will continue to monitor urine output. Recommend 80mg of lasix this morning.  Monitor potassium levels  Monitor LIANA output, keep bulb suction, currently serosanguineous  Restarted on home aspirin, would hold of on restarting plavix for now  Continue diabetic diet      Immunosuppression:   Simulect, solumedrol given   MMF, Methylpred taper, Env   Valcyte to every other day (MWF)     Cardiovascular:  TTE without major changes  Cardiology note appreciated    DM:   Monitor FS, patient now on ISS  Get A1C, patient was not on insulin as outpatient and might need to start insulin on DC    PAD:   Aspirin restarted  Plavix held

## 2023-07-17 NOTE — DIETITIAN INITIAL EVALUATION ADULT - PHYSCIAL ASSESSMENT
Weights:  --Drug Dosing Weight: 96.6 kg/ 213 pounds  (7/15/23)  -- Daily Weights: 104.7 kg/230.8 pounds  (7/17/23)  -- Pt reports his UBW/ Dry Weight was 96 kg  -- Weight History per [facility]: 95.3 kg/210.1 pounds (4/11/23)  -- Weight fluctuations in setting of fluid shifts (IVF, intraoperative fluids). Will continue to monitor weight trends as available/able.   -- IBW: 160 pounds %IBW: 133 %

## 2023-07-17 NOTE — PROGRESS NOTE ADULT - SUBJECTIVE AND OBJECTIVE BOX
[facility] Division of Kidney Diseases & Hypertension  FOLLOW UP NOTE  [phone]--------------------------------------------------------------------------------    HPI: 54 yo M with h/o ESRD on HD x6 years via LUE AVF (outpatient nephrologist: Dr. Bharath Romo), DM2 CAD, CHF, underwent CABG 2015, AICD (2016), and severe PVD (underwent R big toe and second toe amputation in 2021) initially presented for kidney transplant. Pt. underwent DDRT on 7/16. Pt. was transferred to SICU for IV vasopressor support.     24 hour events/subjective: Pt. was seen and evaluated at bedside this morning. Pt. reports pain at incision site. Otherwise feels well. Denies any headaches, fevers/chills, chest pain, SOB, and LE edema.    PAST HISTORY  --------------------------------------------------------------------------------  No significant changes to PMH, PSH, FHx, SHx, unless otherwise noted    ALLERGIES & MEDICATIONS  --------------------------------------------------------------------------------  Allergies    Contrast. (Unknown)    Intolerances      Standing Inpatient Medications  acetaminophen     Tablet .. 975 milliGRAM(s) Oral every 6 hours  aspirin  chewable 81 milliGRAM(s) Oral daily  atorvastatin 40 milliGRAM(s) Oral at bedtime  calcium acetate 667 milliGRAM(s) Oral four times a day with meals  chlorhexidine 4% Liquid 1 Application(s) Topical <User Schedule>  famotidine    Tablet 20 milliGRAM(s) Oral daily  insulin lispro (ADMELOG) corrective regimen sliding scale   SubCutaneous Before meals and at bedtime  methylPREDNISolone sodium succinate Injectable 125 milliGRAM(s) IV Push two times a day  methylPREDNISolone sodium succinate Injectable   IV Push   midodrine. 10 milliGRAM(s) Oral every 8 hours  mycophenolate mofetil 1 Gram(s) Oral <User Schedule>  nystatin    Suspension [id number] Unit(s) Swish and Swallow four times a day  polyethylene glycol 3350 17 Gram(s) Oral daily  senna 2 Tablet(s) Oral at bedtime  trimethoprim   80 mG/sulfamethoxazole 400 mG 1 Tablet(s) Oral daily  valGANciclovir 450 milliGRAM(s) Oral daily    PRN Inpatient Medications  HYDROmorphone  Injectable 0.5 milliGRAM(s) IV Push every 3 hours PRN  sodium chloride 0.9% lock flush 10 milliLiter(s) IV Push every 1 hour PRN  traMADol 25 milliGRAM(s) Oral every 6 hours PRN  traMADol 50 milliGRAM(s) Oral every 6 hours PRN      REVIEW OF SYSTEMS  --------------------------------------------------------------------------------  See HPI    VITALS/PHYSICAL EXAM  --------------------------------------------------------------------------------  T(C): 36.3 (07-17-23 @ 11:00), Max: 36.9 (07-16-23 @ 15:00)  HR: 75 (07-17-23 @ 11:00) (75 - 102)  BP: 112/54 (07-17-23 @ 11:00) (100/51 - 166/74)  RR: 14 (07-17-23 @ 11:00) (11 - 31)  SpO2: 98% (07-17-23 @ 11:00) (93% - 100%)  Wt(kg): --  Height (cm): 175.3 (07-15-23 @ 22:39)  Weight (kg): 96.6 (07-15-23 @ 22:39)  BMI (kg/m2): 31.4 (07-15-23 @ 22:39)  BSA (m2): 2.12 (07-15-23 @ 22:39)      07-16-23 @ 07:01  -  07-17-23 @ 07:00  --------------------------------------------------------  IN: 3259.2 mL / OUT: 545 mL / NET: 2714.2 mL    07-17-23 @ 07:01  -  07-17-23 @ 11:46  --------------------------------------------------------  IN: 633 mL / OUT: 115 mL / NET: 518 mL    Physical Exam:  Gen: NAD  HEENT: PERRL, supple neck, clear oropharynx  Pulm: CTA B/L  CV: RRR, S1S2;  Abd: +BS, soft, nontender/nondistended  : No suprapubic tenderness  Transplant: Mild incisional tenderness. Surgical site is c/d/i  Extremities: no bilateral LE edema noted.   Skin: Warm, without rashes  Vascular access: +LUE AVF with thrill and bruit appreciated    LABS/STUDIES  --------------------------------------------------------------------------------              9.8    17.32 >-----------<  220      [07-17-23 @ 08:32]              30.1     138  |  99  |  61  ----------------------------<  100      [07-17-23 @ 10:38]  5.4   |  18  |  8.18        Ca     10.0     [07-17-23 @ 10:38]      Mg     2.4     [07-17-23 @ 10:38]      Phos  7.2     [07-17-23 @ 10:38]    TPro  5.5  /  Alb  3.0  /  TBili  0.6  /  DBili  x   /  AST  30  /  ALT  12  /  AlkPhos  56  [07-16-23 @ 07:29]    PT/INR: PT 14.9 , INR 1.29       [07-17-23 @ 01:34]  PTT: 27.3       [07-17-23 @ 01:34]    Creatinine Trend:  SCr 8.18 [07-17 @ 10:38]  SCr 7.87 [07-17 @ 08:32]  SCr 7.92 [07-17 @ 05:11]  SCr 8.05 [07-17 @ 01:34]  SCr 7.73 [07-16 @ 21:17]    HBsAb 5.7      [07-15-23 @ 21:22]  HBsAg Nonreact      [07-15-23 @ 21:21]  HBcAb Nonreact      [07-15-23 @ 21:22]  HCV 0.16, Nonreact      [07-15-23 @ 21:22]  HIV Nonreact      [07-15-23 @ 21:21]

## 2023-07-17 NOTE — DIETITIAN INITIAL EVALUATION ADULT - OTHER INFO
Pt is s/p DDRT 7/16/23 POD1:  -- Deltasone,  Solu-medrol, Tacrolimus, Cellcept transplant meds ordered  -- BG management: SSI  -- Urine output per flow sheets 55 ml thus far (7/17), 180 ml (7/16), 10 ml (7/15)   -- Hyperkalemia post transplant; receiving iHD on 7/17 during RD visit  -- Hyperphosphatemia; ordered for Phoslo 3x/day with meals.

## 2023-07-17 NOTE — PROGRESS NOTE ADULT - ASSESSMENT
Patient is a 53y Male  with hx of DM2 CAD, CHF, s/p CABG 2015, AICD (2016), on hemodialysis for approximately 6 years via L AVF (nephrologist Dr. Bharath Romo), He has hx of PVD, is s/p 4 stents in R leg (mid and distal right superficial femoral artery, right popliteal x2 on 1/14/22).  s/p  RT big toe and second toe amputation 2021, on asa and plavix for severe PVD (per pt).   Patient is s/p DDRT 7/16, requiring levophed drip and insulin drip post op with hyperkalemia, transferred to SICU for further management.      Neuro: post op acute pain  -MS: awake and alert, no focal deficit  - Multimodal pain control w/ tylenol ,dilaudid and oxy prn    Resp:  - Out of bed to chair, ambulate as tolerated, and incentive spirometry to prevent atelectasis      CVS: Post op hypotension  - Will wean vasopressor support w/ goal SBP >100 for perfusion    GI:   - diet as tolerated  - Bowel regimen with senna & Miralax    Renal: s/p DDRT for ESRD 2/2 DM  -has left arm fistula, recent HD  -post op hyperkalemia, shift, monitor  -follow up urine output strictly  - care per renal transplant regarding: cellcept, and tacro  - Monitor I&Os and electrolytes  - replete electrolytes as needed     Heme: stable h/h  - Monitor CBC and coags  - VTE prophylaxis: hold  -SCD's    ID:   - Monitor for clinical evidence of active infection  - Monitor WBC, temperature, and procalcitonin  - Antibiotcis: ppx for renal tx    Endo: hx of DM,   - Hyperglycemia requring insulin drip and fs q1h

## 2023-07-17 NOTE — DIETITIAN INITIAL EVALUATION ADULT - NSFNSADHERENCEPTAFT_GEN_A_CORE
-- Pt confirms history of diabetes, Most recent A1c=5.8% (7/22/22), indicates good glycemic control. Pt took Januvia at home for medication management.

## 2023-07-17 NOTE — PROGRESS NOTE ADULT - TIME BILLING
Kidney Transplant recipient with delayed functioning allograft  DDRT post op day 1   DM, Low blood pressure, CAD, PAD, HLD  Creatinine trend noted GIN, likely ATN oliguric, elevated  creatinine, phosphorus  Comorbidities reviewed.    Patient seen, examined and reviewed available clinical and lab data including history,  progress notes and consult notes.  Reviewed immunosuppression - On    Reviewed medication regimen for comorbidities  Suggestions:  No response to diuretic challenge.  Hemodialysis  I have seen the patient and reviewed dialysis prescription  . Dialysis access is noted and is functioning well.  Dialysis prescription has been adjusted for optimized control of volume status, uremia and electrolytes. Management of additional metabolic abnormalities/anemia will continue to be addressed on follow up.  D/w Transplant team  I was present during and reviewed clinical and lab data as well as assessment and plan as documented . Please contact if any additional questions with any change in clinical condition or on availability of any additional information or reports.

## 2023-07-17 NOTE — PROGRESS NOTE ADULT - ASSESSMENT
52 yo M with h/o ESRD on HD x6 years via LUE AVF (outpatient nephrologist: Dr. Bharath Romo), DM2 CAD, CHF, underwent CABG 2015, AICD (2016), and severe PVD (underwent R big toe and second toe amputation in 2021) initially presented for kidney transplant. Pt. underwent DDRT on 7/16. Pt. was transferred to SICU for IV vasopressor support.

## 2023-07-18 LAB
ANION GAP SERPL CALC-SCNC: 16 MMOL/L — SIGNIFICANT CHANGE UP (ref 5–17)
ANION GAP SERPL CALC-SCNC: 17 MMOL/L — SIGNIFICANT CHANGE UP (ref 5–17)
ANION GAP SERPL CALC-SCNC: 18 MMOL/L — HIGH (ref 5–17)
ANION GAP SERPL CALC-SCNC: 18 MMOL/L — HIGH (ref 5–17)
APTT BLD: 24.7 SEC — LOW (ref 27.5–35.5)
APTT BLD: 25.1 SEC — LOW (ref 27.5–35.5)
BASOPHILS # BLD AUTO: 0.02 K/UL — SIGNIFICANT CHANGE UP (ref 0–0.2)
BASOPHILS NFR BLD AUTO: 0.1 % — SIGNIFICANT CHANGE UP (ref 0–2)
BUN SERPL-MCNC: 38 MG/DL — HIGH (ref 7–23)
BUN SERPL-MCNC: 40 MG/DL — HIGH (ref 7–23)
BUN SERPL-MCNC: 48 MG/DL — HIGH (ref 7–23)
BUN SERPL-MCNC: 56 MG/DL — HIGH (ref 7–23)
CALCIUM SERPL-MCNC: 10.1 MG/DL — SIGNIFICANT CHANGE UP (ref 8.4–10.5)
CALCIUM SERPL-MCNC: 9.8 MG/DL — SIGNIFICANT CHANGE UP (ref 8.4–10.5)
CALCIUM SERPL-MCNC: 9.9 MG/DL — SIGNIFICANT CHANGE UP (ref 8.4–10.5)
CALCIUM SERPL-MCNC: 9.9 MG/DL — SIGNIFICANT CHANGE UP (ref 8.4–10.5)
CHLORIDE SERPL-SCNC: 100 MMOL/L — SIGNIFICANT CHANGE UP (ref 96–108)
CHLORIDE SERPL-SCNC: 95 MMOL/L — LOW (ref 96–108)
CHLORIDE SERPL-SCNC: 96 MMOL/L — SIGNIFICANT CHANGE UP (ref 96–108)
CHLORIDE SERPL-SCNC: 97 MMOL/L — SIGNIFICANT CHANGE UP (ref 96–108)
CO2 SERPL-SCNC: 23 MMOL/L — SIGNIFICANT CHANGE UP (ref 22–31)
CO2 SERPL-SCNC: 24 MMOL/L — SIGNIFICANT CHANGE UP (ref 22–31)
CREAT FLD-MCNC: 5.72 MG/DL — SIGNIFICANT CHANGE UP
CREAT SERPL-MCNC: 5.33 MG/DL — HIGH (ref 0.5–1.3)
CREAT SERPL-MCNC: 5.64 MG/DL — HIGH (ref 0.5–1.3)
CREAT SERPL-MCNC: 5.92 MG/DL — HIGH (ref 0.5–1.3)
CREAT SERPL-MCNC: 6.45 MG/DL — HIGH (ref 0.5–1.3)
EGFR: 10 ML/MIN/1.73M2 — LOW
EGFR: 11 ML/MIN/1.73M2 — LOW
EGFR: 11 ML/MIN/1.73M2 — LOW
EGFR: 12 ML/MIN/1.73M2 — LOW
EOSINOPHIL # BLD AUTO: 0 K/UL — SIGNIFICANT CHANGE UP (ref 0–0.5)
EOSINOPHIL NFR BLD AUTO: 0 % — SIGNIFICANT CHANGE UP (ref 0–6)
GLUCOSE BLDC GLUCOMTR-MCNC: 106 MG/DL — HIGH (ref 70–99)
GLUCOSE BLDC GLUCOMTR-MCNC: 142 MG/DL — HIGH (ref 70–99)
GLUCOSE BLDC GLUCOMTR-MCNC: 260 MG/DL — HIGH (ref 70–99)
GLUCOSE BLDC GLUCOMTR-MCNC: 266 MG/DL — HIGH (ref 70–99)
GLUCOSE SERPL-MCNC: 101 MG/DL — HIGH (ref 70–99)
GLUCOSE SERPL-MCNC: 101 MG/DL — HIGH (ref 70–99)
GLUCOSE SERPL-MCNC: 160 MG/DL — HIGH (ref 70–99)
GLUCOSE SERPL-MCNC: 258 MG/DL — HIGH (ref 70–99)
HCT VFR BLD CALC: 27.1 % — LOW (ref 39–50)
HCT VFR BLD CALC: 28.6 % — LOW (ref 39–50)
HGB BLD-MCNC: 8.8 G/DL — LOW (ref 13–17)
HGB BLD-MCNC: 9.3 G/DL — LOW (ref 13–17)
IMM GRANULOCYTES NFR BLD AUTO: 1.1 % — HIGH (ref 0–0.9)
INR BLD: 1.27 RATIO — HIGH (ref 0.88–1.16)
INR BLD: 1.3 RATIO — HIGH (ref 0.88–1.16)
LYMPHOCYTES # BLD AUTO: 0.46 K/UL — LOW (ref 1–3.3)
LYMPHOCYTES # BLD AUTO: 2.8 % — LOW (ref 13–44)
MAGNESIUM SERPL-MCNC: 2.2 MG/DL — SIGNIFICANT CHANGE UP (ref 1.6–2.6)
MAGNESIUM SERPL-MCNC: 2.4 MG/DL — SIGNIFICANT CHANGE UP (ref 1.6–2.6)
MCHC RBC-ENTMCNC: 29.8 PG — SIGNIFICANT CHANGE UP (ref 27–34)
MCHC RBC-ENTMCNC: 30.3 PG — SIGNIFICANT CHANGE UP (ref 27–34)
MCHC RBC-ENTMCNC: 32.5 GM/DL — SIGNIFICANT CHANGE UP (ref 32–36)
MCHC RBC-ENTMCNC: 32.5 GM/DL — SIGNIFICANT CHANGE UP (ref 32–36)
MCV RBC AUTO: 91.9 FL — SIGNIFICANT CHANGE UP (ref 80–100)
MCV RBC AUTO: 93.2 FL — SIGNIFICANT CHANGE UP (ref 80–100)
MONOCYTES # BLD AUTO: 0.45 K/UL — SIGNIFICANT CHANGE UP (ref 0–0.9)
MONOCYTES NFR BLD AUTO: 2.7 % — SIGNIFICANT CHANGE UP (ref 2–14)
NEUTROPHILS # BLD AUTO: 15.6 K/UL — HIGH (ref 1.8–7.4)
NEUTROPHILS NFR BLD AUTO: 93.3 % — HIGH (ref 43–77)
NRBC # BLD: 0 /100 WBCS — SIGNIFICANT CHANGE UP (ref 0–0)
NRBC # BLD: 0 /100 WBCS — SIGNIFICANT CHANGE UP (ref 0–0)
PHOSPHATE SERPL-MCNC: 6.7 MG/DL — HIGH (ref 2.5–4.5)
PHOSPHATE SERPL-MCNC: 7.3 MG/DL — HIGH (ref 2.5–4.5)
PHOSPHATE SERPL-MCNC: 8 MG/DL — HIGH (ref 2.5–4.5)
PHOSPHATE SERPL-MCNC: 8.2 MG/DL — HIGH (ref 2.5–4.5)
PLATELET # BLD AUTO: 198 K/UL — SIGNIFICANT CHANGE UP (ref 150–400)
PLATELET # BLD AUTO: 201 K/UL — SIGNIFICANT CHANGE UP (ref 150–400)
POTASSIUM SERPL-MCNC: 5 MMOL/L — SIGNIFICANT CHANGE UP (ref 3.5–5.3)
POTASSIUM SERPL-MCNC: 5.1 MMOL/L — SIGNIFICANT CHANGE UP (ref 3.5–5.3)
POTASSIUM SERPL-MCNC: 5.4 MMOL/L — HIGH (ref 3.5–5.3)
POTASSIUM SERPL-MCNC: 5.4 MMOL/L — HIGH (ref 3.5–5.3)
POTASSIUM SERPL-SCNC: 5 MMOL/L — SIGNIFICANT CHANGE UP (ref 3.5–5.3)
POTASSIUM SERPL-SCNC: 5.1 MMOL/L — SIGNIFICANT CHANGE UP (ref 3.5–5.3)
POTASSIUM SERPL-SCNC: 5.4 MMOL/L — HIGH (ref 3.5–5.3)
POTASSIUM SERPL-SCNC: 5.4 MMOL/L — HIGH (ref 3.5–5.3)
PROTHROM AB SERPL-ACNC: 14.8 SEC — HIGH (ref 10.5–13.4)
PROTHROM AB SERPL-ACNC: 15 SEC — HIGH (ref 10.5–13.4)
RBC # BLD: 2.95 M/UL — LOW (ref 4.2–5.8)
RBC # BLD: 3.07 M/UL — LOW (ref 4.2–5.8)
RBC # FLD: 18.6 % — HIGH (ref 10.3–14.5)
RBC # FLD: 18.6 % — HIGH (ref 10.3–14.5)
SODIUM SERPL-SCNC: 136 MMOL/L — SIGNIFICANT CHANGE UP (ref 135–145)
SODIUM SERPL-SCNC: 137 MMOL/L — SIGNIFICANT CHANGE UP (ref 135–145)
SODIUM SERPL-SCNC: 137 MMOL/L — SIGNIFICANT CHANGE UP (ref 135–145)
SODIUM SERPL-SCNC: 140 MMOL/L — SIGNIFICANT CHANGE UP (ref 135–145)
TACROLIMUS SERPL-MCNC: 13.6 NG/ML — SIGNIFICANT CHANGE UP
WBC # BLD: 16.16 K/UL — HIGH (ref 3.8–10.5)
WBC # BLD: 16.72 K/UL — HIGH (ref 3.8–10.5)
WBC # FLD AUTO: 16.16 K/UL — HIGH (ref 3.8–10.5)
WBC # FLD AUTO: 16.72 K/UL — HIGH (ref 3.8–10.5)

## 2023-07-18 PROCEDURE — 99231 SBSQ HOSP IP/OBS SF/LOW 25: CPT

## 2023-07-18 PROCEDURE — 99232 SBSQ HOSP IP/OBS MODERATE 35: CPT | Mod: GC,24

## 2023-07-18 PROCEDURE — 93990 DOPPLER FLOW TESTING: CPT | Mod: 26

## 2023-07-18 PROCEDURE — 99233 SBSQ HOSP IP/OBS HIGH 50: CPT | Mod: GC

## 2023-07-18 RX ORDER — CARVEDILOL PHOSPHATE 80 MG/1
6.25 CAPSULE, EXTENDED RELEASE ORAL ONCE
Refills: 0 | Status: COMPLETED | OUTPATIENT
Start: 2023-07-18 | End: 2023-07-18

## 2023-07-18 RX ORDER — CARVEDILOL PHOSPHATE 80 MG/1
6.25 CAPSULE, EXTENDED RELEASE ORAL EVERY 12 HOURS
Refills: 0 | Status: DISCONTINUED | OUTPATIENT
Start: 2023-07-18 | End: 2023-07-20

## 2023-07-18 RX ORDER — SEVELAMER CARBONATE 2400 MG/1
1600 POWDER, FOR SUSPENSION ORAL
Refills: 0 | Status: DISCONTINUED | OUTPATIENT
Start: 2023-07-18 | End: 2023-07-18

## 2023-07-18 RX ORDER — POLYETHYLENE GLYCOL 3350 17 G/17G
17 POWDER, FOR SOLUTION ORAL
Refills: 0 | Status: DISCONTINUED | OUTPATIENT
Start: 2023-07-18 | End: 2023-07-24

## 2023-07-18 RX ORDER — INSULIN GLARGINE 100 [IU]/ML
12 INJECTION, SOLUTION SUBCUTANEOUS AT BEDTIME
Refills: 0 | Status: DISCONTINUED | OUTPATIENT
Start: 2023-07-18 | End: 2023-07-19

## 2023-07-18 RX ORDER — SEVELAMER CARBONATE 2400 MG/1
1600 POWDER, FOR SUSPENSION ORAL
Refills: 0 | Status: DISCONTINUED | OUTPATIENT
Start: 2023-07-18 | End: 2023-07-24

## 2023-07-18 RX ORDER — SODIUM ZIRCONIUM CYCLOSILICATE 10 G/10G
10 POWDER, FOR SUSPENSION ORAL ONCE
Refills: 0 | Status: COMPLETED | OUTPATIENT
Start: 2023-07-18 | End: 2023-07-18

## 2023-07-18 RX ADMIN — ATORVASTATIN CALCIUM 40 MILLIGRAM(S): 80 TABLET, FILM COATED ORAL at 21:14

## 2023-07-18 RX ADMIN — Medication 500000 UNIT(S): at 05:51

## 2023-07-18 RX ADMIN — MIDODRINE HYDROCHLORIDE 10 MILLIGRAM(S): 2.5 TABLET ORAL at 16:52

## 2023-07-18 RX ADMIN — MYCOPHENOLATE MOFETIL 1 GRAM(S): 250 CAPSULE ORAL at 08:08

## 2023-07-18 RX ADMIN — VALGANCICLOVIR 450 MILLIGRAM(S): 450 TABLET, FILM COATED ORAL at 11:46

## 2023-07-18 RX ADMIN — Medication 60 MILLIGRAM(S): at 17:17

## 2023-07-18 RX ADMIN — SODIUM ZIRCONIUM CYCLOSILICATE 10 GRAM(S): 10 POWDER, FOR SUSPENSION ORAL at 16:52

## 2023-07-18 RX ADMIN — Medication 6: at 21:18

## 2023-07-18 RX ADMIN — SENNA PLUS 2 TABLET(S): 8.6 TABLET ORAL at 21:15

## 2023-07-18 RX ADMIN — CARVEDILOL PHOSPHATE 6.25 MILLIGRAM(S): 80 CAPSULE, EXTENDED RELEASE ORAL at 17:17

## 2023-07-18 RX ADMIN — Medication 6: at 17:15

## 2023-07-18 RX ADMIN — SEVELAMER CARBONATE 1600 MILLIGRAM(S): 2400 POWDER, FOR SUSPENSION ORAL at 16:52

## 2023-07-18 RX ADMIN — Medication 1 TABLET(S): at 11:46

## 2023-07-18 RX ADMIN — POLYETHYLENE GLYCOL 3350 17 GRAM(S): 17 POWDER, FOR SOLUTION ORAL at 17:17

## 2023-07-18 RX ADMIN — TACROLIMUS 8 MILLIGRAM(S): 5 CAPSULE ORAL at 08:08

## 2023-07-18 RX ADMIN — Medication 975 MILLIGRAM(S): at 00:19

## 2023-07-18 RX ADMIN — FAMOTIDINE 20 MILLIGRAM(S): 10 INJECTION INTRAVENOUS at 11:45

## 2023-07-18 RX ADMIN — Medication 500000 UNIT(S): at 17:17

## 2023-07-18 RX ADMIN — Medication 81 MILLIGRAM(S): at 11:46

## 2023-07-18 RX ADMIN — MIDODRINE HYDROCHLORIDE 10 MILLIGRAM(S): 2.5 TABLET ORAL at 05:52

## 2023-07-18 RX ADMIN — CARVEDILOL PHOSPHATE 6.25 MILLIGRAM(S): 80 CAPSULE, EXTENDED RELEASE ORAL at 11:45

## 2023-07-18 RX ADMIN — CHLORHEXIDINE GLUCONATE 1 APPLICATION(S): 213 SOLUTION TOPICAL at 05:52

## 2023-07-18 RX ADMIN — Medication 975 MILLIGRAM(S): at 05:51

## 2023-07-18 RX ADMIN — SEVELAMER CARBONATE 1600 MILLIGRAM(S): 2400 POWDER, FOR SUSPENSION ORAL at 12:04

## 2023-07-18 RX ADMIN — Medication 975 MILLIGRAM(S): at 06:52

## 2023-07-18 RX ADMIN — Medication 667 MILLIGRAM(S): at 08:08

## 2023-07-18 RX ADMIN — Medication 60 MILLIGRAM(S): at 05:51

## 2023-07-18 RX ADMIN — MYCOPHENOLATE MOFETIL 1 GRAM(S): 250 CAPSULE ORAL at 21:14

## 2023-07-18 RX ADMIN — Medication 500000 UNIT(S): at 11:46

## 2023-07-18 NOTE — PROGRESS NOTE ADULT - SUBJECTIVE AND OBJECTIVE BOX
[facility] DIVISION OF KIDNEY DISEASES AND HYPERTENSION -- FOLLOW UP NOTE  --------------------------------------------------------------------------------    HPI: 52 yo M with h/o ESRD on HD x6 years via LUE AVF (outpatient nephrologist: Dr. Bharath Romo), DM2 CAD, CHF, underwent CABG 2015, AICD (2016), and severe PVD (underwent R big toe and second toe amputation in 2021) initially presented for kidney transplant. Pt. underwent DDRT on 7/16. Pt. was transferred to SICU for IV vasopressor support, now off. Post-transplant course significant for DGF. Pt. received first HD treatment yesterday (7/17).     24 hour events/subjective: Pt. was seen and evaluated at bedside this morning. Pt. reports mild pain at incision site. Also endorses LUE swelling following HD treatment. Otherwise feels well. Denies any headaches, fevers/chills, chest pain, SOB, and LE edema.      PAST HISTORY  --------------------------------------------------------------------------------  No significant changes to PMH, PSH, FHx, SHx, unless otherwise noted    ALLERGIES & MEDICATIONS  --------------------------------------------------------------------------------  Allergies    Contrast. (Unknown)    Intolerances    Standing Inpatient Medications  aspirin  chewable 81 milliGRAM(s) Oral daily  atorvastatin 40 milliGRAM(s) Oral at bedtime  calcium acetate 667 milliGRAM(s) Oral four times a day with meals  chlorhexidine 4% Liquid 1 Application(s) Topical <User Schedule>  famotidine    Tablet 20 milliGRAM(s) Oral daily  insulin lispro (ADMELOG) corrective regimen sliding scale   SubCutaneous Before meals and at bedtime  methylPREDNISolone sodium succinate Injectable 60 milliGRAM(s) IV Push two times a day  methylPREDNISolone sodium succinate Injectable   IV Push   midodrine. 10 milliGRAM(s) Oral every 8 hours  mycophenolate mofetil 1 Gram(s) Oral <User Schedule>  nystatin    Suspension 590572 Unit(s) Swish and Swallow four times a day  polyethylene glycol 3350 17 Gram(s) Oral daily  senna 2 Tablet(s) Oral at bedtime  tacrolimus ER Tablet (ENVARSUS XR) 8 milliGRAM(s) Oral <User Schedule>  trimethoprim   80 mG/sulfamethoxazole 400 mG 1 Tablet(s) Oral daily  valGANciclovir 450 milliGRAM(s) Oral daily    PRN Inpatient Medications  HYDROmorphone  Injectable 0.5 milliGRAM(s) IV Push every 3 hours PRN  sodium chloride 0.9% lock flush 10 milliLiter(s) IV Push every 1 hour PRN  traMADol 25 milliGRAM(s) Oral every 6 hours PRN  traMADol 50 milliGRAM(s) Oral every 6 hours PRN      REVIEW OF SYSTEMS  --------------------------------------------------------------------------------  See HPI    VITALS/PHYSICAL EXAM  --------------------------------------------------------------------------------  T(C): 36.8 (07-18-23 @ 03:00), Max: 37.1 (07-17-23 @ 19:00)  HR: 81 (07-18-23 @ 07:00) (67 - 95)  BP: 142/65 (07-18-23 @ 07:00) (112/54 - 164/73)  RR: 17 (07-18-23 @ 07:00) (13 - 22)  SpO2: 100% (07-18-23 @ 07:00) (97% - 100%)  Wt(kg): --    07-17-23 @ 07:01  -  07-18-23 @ 07:00  --------------------------------------------------------  IN: 2073 mL / OUT: 1145 mL / NET: 928 mL    Physical Exam:  Gen: NAD  HEENT: PERRL, supple neck, clear oropharynx  Pulm: CTA B/L  CV: RRR, S1S2;  Abd: +BS, soft, nontender/nondistended  : No suprapubic tenderness  Transplant: Mild incisional tenderness. Surgical site is c/d/i  Extremities: no bilateral LE edema noted. +LUE swelling  Skin: Warm, without rashes  Vascular access: +LUE AVF with thrill and bruit appreciated    LABS/STUDIES  --------------------------------------------------------------------------------              9.3    18.41 >-----------<  214      [07-17-23 @ 23:17]              27.4     137  |  97  |  40  ----------------------------<  101      [07-18-23 @ 05:48]  5.1   |  23  |  5.64        Ca     9.9     [07-18-23 @ 05:48]      Mg     2.4     [07-18-23 @ 05:48]      Phos  7.3     [07-18-23 @ 05:48]      PT/INR: PT 14.8 , INR 1.27       [07-17-23 @ 23:17]  PTT: 24.7       [07-17-23 @ 23:17]    Creatinine Trend:  SCr 5.64 [07-18 @ 05:48]  SCr 5.33 [07-18 @ 03:05]  SCr 4.87 [07-17 @ 23:17]  SCr 8.23 [07-17 @ 15:34]  SCr 8.18 [07-17 @ 10:38]    Tacrolimus (), Serum: 5.4 ng/mL (07-17 @ 05:11)    HBsAb 5.7      [07-15-23 @ 21:22]  HBsAg Nonreact      [07-15-23 @ 21:21]  HBcAb Nonreact      [07-15-23 @ 21:22]  HCV 0.16, Nonreact      [07-15-23 @ 21:22]  HIV Nonreact      [07-15-23 @ 21:21]

## 2023-07-18 NOTE — PROGRESS NOTE ADULT - ASSESSMENT
54 yo M with hx of DM2 CAD, CHF, s/p CABG 2015, AICD (2016), on hemodialysis for approximately 6 years via L AVF (nephrologist Dr. Bharath Romo), He has hx of PVD, is s/p 4 stents in R leg (mid and distal right superficial femoral artery, right popliteal x2 on 1/14/22).; is sp RT big toe and second toe amputation 2021, on asa and plavix for severe PVD (per pt). Now here for possible DDRT. Pt reports does not make urine only few drops occasionally. Pt denies N/V/D, fevers/chills, CP, SOB. Pt reports last ate at 4pm today and had HD yesterday 7/14/23.  Nephrology consulted for ESRD s/p DDRT.      ESRD on HD [facility]  Nephrologist : Dr. Thomas / DR Hooper at   [facility]   s/p DDRT 7/16   s/p HD 7/17   HD consent in chart.   HD per Transplant team  Renal diet   Monitor     s/p DDRT @ [facility] 7/16/2023   Underwent L DDRT 7/13/2023 s/p  Simulect and solumedrol for induction.   Started on insulin drip intraoperatively for hyperkalemia after reperfusion and transferred to SICU   Monitor BMP, urine output   Pt oliguric ~ 115 cc urine output over 24 hrs   s/p Lasix 80 mg IVP today per Transplant   s/p HD 7/17   Management per Transplant team - no urgent indication for HD Currently.   Continue immunosuppressants per transplant : Envarsus 8 mg qd, Cellcept 1 gm BID, and prednisone taper. FK level 13.6  Check tacrolimus trough 30 minutes prior to AM dose, goal: 8-11.      Hyperkalemia   in setting of ESRD and recent DDRT with sub-optimally functioning allograft.   s/p HD 7/17   Mgmt per Transplant/ICU  Monitor     HTN:   BP acceptable    Monitor     Hyperphosphatemia  Low PO4 diet  Continue Phoslo   Monitor PO4 daily

## 2023-07-18 NOTE — PROGRESS NOTE ADULT - SUBJECTIVE AND OBJECTIVE BOX
DATE OF SERVICE: 07-18-23 @ 12:24  CHIEF COMPLAINT:Patient is a 53y old  Male who presents with a chief complaint of Possible DDRT (18 Jul 2023 09:43)    	        PAST MEDICAL & SURGICAL HISTORY:  Diabetes mellitus  type 2      Foot ulcer due to secondary DM      Coronary artery disease      Acute on chronic systolic heart failure      Breast Reduction  at age 18      Toe amputation status, left      S/P CABG (coronary artery bypass graft)      AICD (automatic cardioverter/defibrillator) present              REVIEW OF SYSTEMS:    RESPIRATORY: No cough, wheezing, chills or hemoptysis; No Shortness of Breath  CARDIOVASCULAR: No chest pain, palpitations,   GASTROINTESTINAL: No abdominal or epigastric pain. No nausea, vomiting,  GENITOURINARY: No dysuria, frequency, hematuria, or incontinence  NEUROLOGICAL: No headaches,   some l hand / arm swelling    Medications:  MEDICATIONS  (STANDING):  aspirin  chewable 81 milliGRAM(s) Oral daily  atorvastatin 40 milliGRAM(s) Oral at bedtime  carvedilol 6.25 milliGRAM(s) Oral every 12 hours  chlorhexidine 4% Liquid 1 Application(s) Topical <User Schedule>  famotidine    Tablet 20 milliGRAM(s) Oral daily  insulin lispro (ADMELOG) corrective regimen sliding scale   SubCutaneous Before meals and at bedtime  methylPREDNISolone sodium succinate Injectable 60 milliGRAM(s) IV Push two times a day  methylPREDNISolone sodium succinate Injectable   IV Push   midodrine. 10 milliGRAM(s) Oral every 8 hours  mycophenolate mofetil 1 Gram(s) Oral <User Schedule>  nystatin    Suspension 825195 Unit(s) Swish and Swallow four times a day  polyethylene glycol 3350 17 Gram(s) Oral two times a day  senna 2 Tablet(s) Oral at bedtime  sevelamer carbonate 1600 milliGRAM(s) Oral three times a day with meals  trimethoprim   80 mG/sulfamethoxazole 400 mG 1 Tablet(s) Oral daily  valGANciclovir 450 milliGRAM(s) Oral daily    MEDICATIONS  (PRN):  HYDROmorphone  Injectable 0.5 milliGRAM(s) IV Push every 3 hours PRN breakthrough pain  sodium chloride 0.9% lock flush 10 milliLiter(s) IV Push every 1 hour PRN Pre/post blood products, medications, blood draw, and to maintain line patency  traMADol 25 milliGRAM(s) Oral every 6 hours PRN Moderate Pain (4 - 6)  traMADol 50 milliGRAM(s) Oral every 6 hours PRN Severe Pain (7 - 10)    	    PHYSICAL EXAM:  T(C): 36.5 (07-18-23 @ 11:00), Max: 37.1 (07-17-23 @ 19:00)  HR: 79 (07-18-23 @ 12:00) (67 - 95)  BP: 159/75 (07-18-23 @ 12:00) (120/53 - 164/72)  RR: 33 (07-18-23 @ 12:00) (13 - 33)  SpO2: 99% (07-18-23 @ 12:00) (98% - 100%)  Wt(kg): --  I&O's Summary    17 Jul 2023 07:01  -  18 Jul 2023 07:00  --------------------------------------------------------  IN: 2073 mL / OUT: 1145 mL / NET: 928 mL    18 Jul 2023 07:01  -  18 Jul 2023 12:24  --------------------------------------------------------  IN: 350 mL / OUT: 55 mL / NET: 295 mL        Appearance: Normal	  HEENT:   Normal oral mucosa, PERRL, EOMI	  Cardiovascular: Normal S1 S2, No JVD,   Respiratory: Lungs clear to auscultation	  Psychiatry: A & O   Gastrointestinal:  Soft, Non-tender, + BS	    Neurologic: Non-focal  Extremities: Normal range of motion,  l arm swelling   TELEMETRY: 	    ECG:  	  RADIOLOGY:  OTHER: 	  	  LABS:	 	    CARDIAC MARKERS:                                8.8    16.72 )-----------( 201      ( 18 Jul 2023 12:10 )             27.1     07-18    137  |  97  |  40<H>  ----------------------------<  101<H>  5.1   |  23  |  5.64<H>    Ca    9.9      18 Jul 2023 05:48  Phos  7.3     07-18  Mg     2.4     07-18      proBNP:   Lipid Profile:   HgA1c:   TSH:

## 2023-07-18 NOTE — PROGRESS NOTE ADULT - PROBLEM SELECTOR PLAN 1
Pt. with h/o ESRD on HD. Underwent DDRT on 7/16/23. Post-transplant course significant for DGF, pt. received first HD treatment yesterday (7/17). Pt. is oliguric, documented urine output is 115 cc over the past 24 hours. Pt. clinically stable. Labs reviewed. No urgent indication for HD currently. Recommend IV Lasix 80 mg BID today as pt. remains oliguric. Continue with immunosuppression, as outlined below. Monitor labs and urine output. Avoid nephrotoxins. Dose medications as per eGFR.

## 2023-07-18 NOTE — PROGRESS NOTE ADULT - SUBJECTIVE AND OBJECTIVE BOX
54 yo M with hx of DM2 CAD, CHF, s/p CABG 2015, AICD (2016), on hemodialysis for approximately 6 years via L AVF (nephrologist Dr. Bharath Romo), He has hx of PVD, is s/p 4 stents in R leg (mid and distal right superficial femoral artery, right popliteal x2 on 1/14/22).; is sp RT big toe and second toe amputation 2021, on asa and plavix for severe PVD (per pt).     Now here for DDRT. Pt reports dose not make urine only few drops occasionally. Pt denies N/V/D, fevers/chills, CP, SOB. Pt reports last ate at 4pm today and had HD yesterday 7/14/23.      Underwent L DDRT with CIT 16hrs, 1A,1V,1U. Started on insulin drip intraoperatively for hyperkalemia after reperfusion. He was started on pressors (Levo - 0.04) intraoperatively and received 2U PRBC. Got Simulect and solumedrol for induction.     Interval events:   POD#2, remains in SICU, HDN stable without pressor requirements. Off insulin gtt.   Got 1 unit of PRBC yesterday 7/17 with good response.  Urine output still minimal hematuria  LIANA output remains serosanguinous  Graft ultrasound - well perfused kidney with no obvious renal artery stenosis. High normal velocities. Repeat US this morning with lower resistive index and small perinephric collection, no hydronephrosis.   ECHOcardiogram without major abnormalities.   S/P sesion of hemodialisis 7/17 net 0. Left hand edema after HD without numbness/tingling.  Aspirin restarted    Vital Signs Last 24 Hrs  T(C): 36.6 (18 Jul 2023 07:00), Max: 37.1 (17 Jul 2023 19:00)  T(F): 97.9 (18 Jul 2023 07:00), Max: 98.8 (17 Jul 2023 19:00)  HR: 74 (18 Jul 2023 09:00) (67 - 95)  BP: 144/64 (18 Jul 2023 09:00) (112/54 - 164/73)  BP(mean): 92 (18 Jul 2023 09:00) (76 - 106)  RR: 16 (18 Jul 2023 09:00) (13 - 22)  SpO2: 100% (18 Jul 2023 09:00) (98% - 100%)    Parameters below as of 18 Jul 2023 07:00  Patient On (Oxygen Delivery Method): room air    I&O's Detail    17 Jul 2023 07:01  -  18 Jul 2023 07:00  --------------------------------------------------------  IN:    Insulin: 3 mL    Oral Fluid: 990 mL    Other (mL): 800 mL    sodium chloride 0.9%: 280 mL  Total IN: 2073 mL    OUT:    Bulb (mL): 230 mL    Indwelling Catheter - Urethral (mL): 115 mL    Other (mL): 800 mL  Total OUT: 1145 mL    Total NET: 928 mL      18 Jul 2023 07:01  -  18 Jul 2023 09:47  --------------------------------------------------------  IN:    Oral Fluid: 150 mL  Total IN: 150 mL    OUT:    Indwelling Catheter - Urethral (mL): 0 mL  Total OUT: 0 mL    Total NET: 150 mL                            9.3    18.41 )-----------( 214      ( 17 Jul 2023 23:17 )             27.4     07-18    137  |  97  |  40<H>  ----------------------------<  101<H>  5.1   |  23  |  5.64<H>    Ca    9.9      18 Jul 2023 05:48  Phos  7.3     07-18  Mg     2.4     07-18        PHYSICAL EXAM:  GENERAL: NAD, well-groomed, well-developed  HEAD:  Atraumatic, Normocephalic  EYES: EOMI, PERRLA, conjunctiva and sclera clear  NECK: Supple, No JVD, L IJ TLC CDI  CHEST/LUNG: non-labored breathing on room air.   HEART: Regular rate and rhythm; S1/S2  ABDOMEN: Soft, Non-distended, LLQ incision with staples in place healing well. Miles drain with bulb suction SS output, no signs of infection.  : granda in place with hematuria.  VASCULAR: Normal pulses, Normal capillary refill  EXTREMITIES:  2+ Peripheral Pulses, No cyanosis, L hand edema  SKIN: Warm, Intact

## 2023-07-18 NOTE — PROGRESS NOTE ADULT - PROBLEM SELECTOR PLAN 3
Pt. with hyperkalemia in the setting of ESRD and recent DDRT with sub-optimally functioning allograft. Serum potassium improved to 5.1 today following medical management and HD yesterday. Monitor serum potassium.    If you have any questions, please feel free to contact me  Enrique Nagel  Nephrology Fellow  [phone] / [employer] Teams(Preferred)  (After 5pm or on weekends please page the on-call fellow)

## 2023-07-18 NOTE — PROGRESS NOTE ADULT - SUBJECTIVE AND OBJECTIVE BOX
Subjective: Patient seen and examined. No new events except as noted.     SUBJECTIVE/ROS:  No chest pain, dyspnea, palpitation, or dizziness.       MEDICATIONS:  MEDICATIONS  (STANDING):  aspirin  chewable 81 milliGRAM(s) Oral daily  atorvastatin 40 milliGRAM(s) Oral at bedtime  calcium acetate 667 milliGRAM(s) Oral four times a day with meals  chlorhexidine 4% Liquid 1 Application(s) Topical <User Schedule>  famotidine    Tablet 20 milliGRAM(s) Oral daily  insulin lispro (ADMELOG) corrective regimen sliding scale   SubCutaneous Before meals and at bedtime  methylPREDNISolone sodium succinate Injectable 60 milliGRAM(s) IV Push two times a day  methylPREDNISolone sodium succinate Injectable   IV Push   midodrine. 10 milliGRAM(s) Oral every 8 hours  mycophenolate mofetil 1 Gram(s) Oral <User Schedule>  nystatin    Suspension 245460 Unit(s) Swish and Swallow four times a day  polyethylene glycol 3350 17 Gram(s) Oral daily  senna 2 Tablet(s) Oral at bedtime  tacrolimus ER Tablet (ENVARSUS XR) 8 milliGRAM(s) Oral <User Schedule>  trimethoprim   80 mG/sulfamethoxazole 400 mG 1 Tablet(s) Oral daily  valGANciclovir 450 milliGRAM(s) Oral daily      PHYSICAL EXAM:  T(C): 36.8 (07-18-23 @ 03:00), Max: 37.1 (07-17-23 @ 19:00)  HR: 81 (07-18-23 @ 07:00) (67 - 95)  BP: 142/65 (07-18-23 @ 07:00) (112/54 - 164/73)  RR: 17 (07-18-23 @ 07:00) (13 - 22)  SpO2: 100% (07-18-23 @ 07:00) (97% - 100%)  Wt(kg): --  I&O's Summary    17 Jul 2023 07:01  -  18 Jul 2023 07:00  --------------------------------------------------------  IN: 2073 mL / OUT: 1145 mL / NET: 928 mL            JVP: Normal  Neck: supple  Lung: clear   CV: S1 S2 , Murmur:  Abd: soft  Ext: No edema  neuro: Awake / alert  Psych: flat affect  Skin: normal``    LABS/DATA:    CARDIAC MARKERS:                                9.3    18.41 )-----------( 214      ( 17 Jul 2023 23:17 )             27.4     07-18    137  |  97  |  40<H>  ----------------------------<  101<H>  5.1   |  23  |  5.64<H>    Ca    9.9      18 Jul 2023 05:48  Phos  7.3     07-18  Mg     2.4     07-18      proBNP:   Lipid Profile:   HgA1c:   TSH:     TELE:  EKG:

## 2023-07-18 NOTE — PROGRESS NOTE ADULT - SUBJECTIVE AND OBJECTIVE BOX
[facility]   MD DANIEL WALDEN MD BRIANA PETITO, NP    TEL:  FROM 9 AM to 5 PM ---OFFICE: [phone]  FROM 5 PM - 9 AM PLEASE CALL ANSWERING SERVICE: [phone]     RENAL PROGRESS NOTE: DATE OF SERVICE 07-18-23 @ 15:43    Patient is a 53y old  Male who presents with a chief complaint of Possible DDRT     Patient seen and examined at bedside in ICU. No chest pain/sob    VITALS:  T(F): 97.7 (07-18-23 @ 11:00), Max: 98.8 (07-17-23 @ 19:00)  HR: 76 (07-18-23 @ 13:00)  BP: 153/67 (07-18-23 @ 13:00)  RR: 15 (07-18-23 @ 13:00)  SpO2: 97% (07-18-23 @ 13:00)  Wt(kg): --    07-17 @ 07:01  -  07-18 @ 07:00  --------------------------------------------------------  IN: 2073 mL / OUT: 1145 mL / NET: 928 mL    07-18 @ 07:01  -  07-18 @ 15:43  --------------------------------------------------------  IN: 350 mL / OUT: 55 mL / NET: 295 mL      PHYSICAL EXAM:  Constitutional: NAD  Neck: No JVD  Respiratory: CTAB, no wheezes, rales or rhonchi  Cardiovascular: S1, S2, RRR  Gastrointestinal: BS+, soft, NT/ND. Surgical incision c/d/i, LIANA drain in place   Extremities: No peripheral edema  Access: L AVF thrill palpable,  + bruit     Hospital Medications:   MEDICATIONS  (STANDING):  aspirin  chewable 81 milliGRAM(s) Oral daily  atorvastatin 40 milliGRAM(s) Oral at bedtime  carvedilol 6.25 milliGRAM(s) Oral every 12 hours  chlorhexidine 4% Liquid 1 Application(s) Topical <User Schedule>  famotidine    Tablet 20 milliGRAM(s) Oral daily  insulin lispro (ADMELOG) corrective regimen sliding scale   SubCutaneous Before meals and at bedtime  methylPREDNISolone sodium succinate Injectable   IV Push   methylPREDNISolone sodium succinate Injectable 60 milliGRAM(s) IV Push two times a day  midodrine. 10 milliGRAM(s) Oral every 8 hours  mycophenolate mofetil 1 Gram(s) Oral <User Schedule>  nystatin    Suspension 928405 Unit(s) Swish and Swallow four times a day  polyethylene glycol 3350 17 Gram(s) Oral two times a day  senna 2 Tablet(s) Oral at bedtime  sevelamer carbonate 1600 milliGRAM(s) Oral three times a day with meals  sodium zirconium cyclosilicate 10 Gram(s) Oral once  trimethoprim   80 mG/sulfamethoxazole 400 mG 1 Tablet(s) Oral daily  valGANciclovir 450 milliGRAM(s) Oral daily    Tacrolimus (), Serum: 13.6 ng/mL (07-18 @ 05:48)    LABS:  07-18    137  |  96  |  48<H>  ----------------------------<  160<H>  5.4<H>   |  23  |  5.92<H>    Ca    10.1      18 Jul 2023 12:10  Phos  8.0     07-18  Mg     2.4     07-18      Creatinine Trend: 5.92 <--, 5.64 <--, 5.33 <--, 4.87 <--, 8.23 <--, 8.18 <--, 7.87 <--, 7.92 <--, 8.05 <--, 7.73 <--, 6.97 <--, 7.03 <--, 6.55 <--, 7.14 <--    Phosphorus: 8.0 mg/dL (07-18 @ 12:10)  Phosphorus: 7.3 mg/dL (07-18 @ 05:48)  Phosphorus: 6.7 mg/dL (07-18 @ 03:05)  Phosphorus: 5.5 mg/dL (07-17 @ 23:17)                              8.8    16.72 )-----------( 201      ( 18 Jul 2023 12:10 )             27.1     Urine Studies:  Urinalysis - [07-18-23 @ 12:10]      Color  / Appearance  / SG  / pH       Gluc 160 / Ketone   / Bili  / Urobili        Blood  / Protein  / Leuk Est  / Nitrite       RBC  / WBC  / Hyaline  / Gran  / Sq Epi  / Non Sq Epi  / Bacteria       TSH 5.16      [07-22-22 @ 04:43]    HBsAb 5.7      [07-15-23 @ 21:22]  HBsAg Nonreact      [07-15-23 @ 21:21]  HBcAb Nonreact      [07-15-23 @ 21:22]  HCV 0.16, Nonreact      [07-15-23 @ 21:22]  HIV Nonreact      [07-15-23 @ 21:21]      RADIOLOGY & ADDITIONAL STUDIES:

## 2023-07-18 NOTE — PROGRESS NOTE ADULT - ASSESSMENT
52 yo M with h/o ESRD on HD x6 years via LUE AVF (outpatient nephrologist: Dr. Bharath Romo), DM2 CAD, CHF, underwent CABG 2015, AICD (2016), and severe PVD (underwent R big toe and second toe amputation in 2021) initially presented for kidney transplant. Pt. underwent DDRT on 7/16. Pt. was transferred to SICU for IV vasopressor support. Post-transplant course significant for DGF. Pt. received first HD treatment yesterday (7/17).

## 2023-07-18 NOTE — PROGRESS NOTE ADULT - ASSESSMENT
Patient is a 53y Male  with hx of DM2 CAD, CHF, s/p CABG 2015, AICD (2016), on hemodialysis for approximately 6 years via L AVF (nephrologist Dr. Bharath Romo), He has hx of PVD, is s/p 4 stents in R leg (mid and distal right superficial femoral artery, right popliteal x2 on 1/14/22).  s/p  RT big toe and second toe amputation 2021, on asa and plavix for severe PVD (per pt).   Patient is s/p DDRT 7/16, requiring levophed drip and insulin drip post op with hyperkalemia, transferred to SICU         CVS: Post op hypotension  improved  no pressors     - diet as tolerated  - Bowel regimen     Renal: s/p DDRT for ESRD 2/2 DM  -has left arm fistula,   -post op hyperkalemia better  fluids as per sicu/renal  - care per renal transplant regarding: cellcept, and tacro  - Monitor I&Os and electrolytes  - replete electrolytes as needed   s/p hd on 7/17   may need a few hd treatments     Heme: stable h/h  - Monitor CBC and coags  -  VTE prophylaxis: hold  -SCD's        Endo: hx of DM,   - Hyperglycemia requring insulin drip d/c d    now fsg riss    steroid taper     -oob

## 2023-07-18 NOTE — PROGRESS NOTE ADULT - ASSESSMENT
52 yo M with hx of DM2 CAD, CHF, s/p CABG 2015, AICD (2016), on hemodialysis for approximately 6 years via L AVF (nephrologist Dr. Bharath Romo), He has hx of PVD, is s/p 4 stents in R leg (mid and distal right superficial femoral artery, right popliteal x2 on 1/14/22).; is sp RT big toe and second toe amputation 2021, on asa and plavix for severe PVD (per pt).     Now here for DDRT. Pt reports dose not make urine only few drops occasionally. Pt denies N/V/D, fevers/chills, CP, SOB. Pt reports last ate at 4pm today and had HD yesterday 7/14/23.      Underwent L DDRT with CIT 16hrs, 1A,1V,1U. Started on insulin drip intraoperatively for hyperkalemia after reperfusion. He was started on pressors (Levo - 0.004) intraoperatively and received 2U PRBC. Got Simulect and solumedrol for induction.     Plan:    POD#2- L DDRT  Remains HDN stable, A line can be dc  Monitor urine output, creatinine  Renal US without major changes, will continue to monitor urine output.   Monitor potassium levels  Monitor LIANA output, keep bulb suction, currently serosanguineous  Restarted on home aspirin, would hold of on restarting plavix for now until 24 hr of stable hgb. Last transfusion 7/17  Continue diabetic diet, can advance to solids.  F/U L UE US  Can be downgraded to 6 carolee      Immunosuppression:   Simulect, solumedrol given   MMF, Methylpred taper, Env by level  Valcyte to every other day (MWF)     Cardiovascular:  TTE without major changes  Cardiology note appreciated    DM:   Monitor FS, patient now on ISS  Get A1C, patient was not on insulin as outpatient and might need to start insulin on DC    PAD:   Aspirin restarted  Plavix held

## 2023-07-18 NOTE — PROGRESS NOTE ADULT - ASSESSMENT
Patient is a 53y Male  with hx of DM2 CAD, CHF, s/p CABG 2015, AICD (2016), on hemodialysis for approximately 6 years via L AVF (nephrologist Dr. Bharath Romo), He has hx of PVD, is s/p 4 stents in R leg (mid and distal right superficial femoral artery, right popliteal x2 on 1/14/22).  s/p RT big toe and second toe amputation 2021, on asa and plavix for severe PVD (per pt).   Patient is s/p DDRT 7/16, requiring levophed drip and insulin drip post op with hyperkalemia, transferred to SICU for further management.      Neuro: post op acute pain  -MS: awake and alert, no focal deficit  - Multimodal pain control w/ tylenol ,dilaudid, and oxy prn    Resp:  - Out of bed to chair, ambulate as tolerated, and incentive spirometry to prevent atelectasis      CVS: Post op hypotension  -Resolved  - Goal SBP >100 for perfusion    GI:   - Diet as tolerated  - Bowel regimen with senna & miralax    Renal: s/p DDRT for ESRD 2/2 DM  -Has left arm fistula, recent HD  -Continue to trend potassium  -Follow up urine output strictly  -Care per renal transplant regarding: cellcept, and tacro  -Monitor I&Os and electrolytes  -Replete electrolytes as needed     Heme: stable h/h  -monitor CBC and coags  -VTE prophylaxis: hold  -SCD's    ID:   - Monitor for clinical evidence of active infection  - Monitor WBC, temperature, and procalcitonin  - Antibiotics ppx for renal tx    Endo: hx of DM  -Insulin sliding scale  Patient is a 53y Male  with hx of DM2 CAD, CHF, s/p CABG 2015, AICD (2016), on hemodialysis for approximately 6 years via L AVF (nephrologist Dr. Bharath Romo), He has hx of PVD, is s/p 4 stents in R leg (mid and distal right superficial femoral artery, right popliteal x2 on 1/14/22).  s/p RT big toe and second toe amputation 2021, on asa and Plavix for severe PVD (per pt).   Patient is s/p DDRT 7/16, requiring levophed drip and insulin drip post op with hyperkalemia, transferred to SICU for further management.      Neuro: post op acute pain  -MS: awake and alert, no focal deficit  - Multimodal pain control w/ tylenol, dilaudid, and oxy prn    Resp:  - Out of bed to chair, ambulate as tolerated, and incentive spirometry to prevent atelectasis      CVS: Post op hypotension  -Resolved  - Goal SBP >100 for perfusion    GI:   - Diet as tolerated  - Bowel regimen with senna & miralax    Renal: s/p DDRT for ESRD 2/2 DM  -Has left arm fistula, recent HD  -Continue to trend potassium  -Follow up urine output strictly  -Care per renal transplant regarding: cellcept, and tacro  -Monitor I&Os and electrolytes  -Replete electrolytes as needed     Heme: stable h/h  -monitor CBC and coags  -VTE prophylaxis: hold  -SCD's    ID:   - Monitor for clinical evidence of active infection  - Monitor WBC, temperature, and procalcitonin  - Antibiotics ppx for renal tx    Endo: hx of DM  -Insulin sliding scale

## 2023-07-18 NOTE — PROGRESS NOTE ADULT - SUBJECTIVE AND OBJECTIVE BOX
HISTORY    Patient is a 53y Male  with hx of DM2 CAD, CHF, s/p CABG 2015, AICD (2016), on hemodialysis for approximately 6 years via L AVF (nephrologist Dr. Bharath Romo), He has hx of PVD, is s/p 4 stents in R leg (mid and distal right superficial femoral artery, right popliteal x2 on 1/14/22).  s/p  RT big toe and second toe amputation 2021, on asa and plavix for severe PVD (per pt).  Patient is s/p DDRT 7/16, requiring levophed drip and insulin drip post op with hyperkalemia, transferred to SICU for further management.    24 HOUR EVENTS:  -Pt transitioned off insulin drip and started on a insulin sliding scale  -Pt received 80 mg furosemide IVP  -Pt had a HD session 7/17    Meds: acetaminophen Tablet .. 975 milliGRAM(s) Oral every 6 hours  HYDROmorphone  Injectable 0.5 milliGRAM(s) IV Push every 3 hours PRN breakthrough pain  traMADol 25 milliGRAM(s) Oral every 6 hours PRN Moderate Pain (4 - 6)  traMADol 50 milliGRAM(s) Oral every 6 hours PRN Severe Pain (7 - 10)    RESPIRATORY  RR: 20 (07-17-23 @ 23:00) (11 - 21)  SpO2: 100% (07-17-23 @ 23:00) (97% - 100%)  ABG - ( 17 Jul 2023 01:31 )  pH: 7.38  /  pCO2: 40    /  pO2: 94    / HCO3: 24    / Base Excess: -1.3  /  SaO2: 97.1      CARDIOVASCULAR  HR: 83 (07-17-23 @ 23:00) (75 - 95)  BP: 144/70 (07-17-23 @ 23:00) (104/51 - 164/73)  BP(mean): 101 (07-17-23 @ 23:00) (74 - 106)  ABP: 152/61 (07-17-23 @ 23:00) (87/37 - 152/61)  ABP(mean): 91 (07-17-23 @ 23:00) (52 - 95)    GI/NUTRITION  Meds: famotidine    Tablet 20 milliGRAM(s) Oral daily  polyethylene glycol 3350 17 Gram(s) Oral daily  senna 2 Tablet(s) Oral at bedtime    GENITOURINARY  I&O's Detail    07-16 @ 07:01 - 07-17 @ 07:00  --------------------------------------------------------  IN:    Insulin: 121.3 mL    IV PiggyBack: 100 mL    Norepinephrine: 74.3 mL    Norepinephrine: 3.6 mL    Oral Fluid: 980 mL    PRBCs (Packed Red Blood Cells): 300 mL    sodium chloride 0.9%: 1680 mL    Total IN: 3259.2 mL    OUT:    Bulb (mL): 365 mL    Indwelling Catheter - Urethral (mL): 180 mL  Total OUT: 545 mL    Total NET: 2714.2 mL    07-17 @ 07:01 - 07-18 @ 00:41  --------------------------------------------------------  IN:    Insulin: 3 mL    Oral Fluid: 750 mL    Other (mL): 800 mL    sodium chloride 0.9%: 280 mL  Total IN: 1833 mL    OUT:    Bulb (mL): 190 mL    Indwelling Catheter - Urethral (mL): 85 mL    Other (mL): 800 mL  Total OUT: 1075 mL    Total NET: 758 mL    07-17    137  |  97  |  33<H>  ----------------------------<  105<H>  4.5   |  23  |  4.87<H>    Ca    9.9      17 Jul 2023 23:17  Phos  5.5     07-17  Mg     2.2     07-17    TPro  5.5<L>  /  Alb  3.0<L>  /  TBili  0.6  /  DBili  x   /  AST  30  /  ALT  12  /  AlkPhos  56  07-16    Meds: calcium acetate 667 milliGRAM(s) Oral four times a day with meals  sodium chloride 0.9% lock flush 10 milliLiter(s) IV Push every 1 hour PRN Pre/post blood products, medications, blood draw, and to maintain line patency    HEMATOLOGIC  Meds: aspirin  chewable 81 milliGRAM(s) Oral daily    [x] VTE Prophylaxis                        9.3    18.41 )-----------( 214      ( 17 Jul 2023 23:17 )             27.4     PT/INR - ( 17 Jul 2023 23:17 )   PT: 14.8 sec;   INR: 1.27 ratio         PTT - ( 17 Jul 2023 23:17 )  PTT:24.7 sec  Transfusion     [ ] PRBC   [ ] Platelets   [ ] FFP   [ ] Cryoprecipitate    INFECTIOUS DISEASES  WBC Count: 18.41 K/uL (07-17 @ 23:17)  WBC Count: 17.32 K/uL (07-17 @ 08:32)  WBC Count: 16.43 K/uL (07-17 @ 05:11)    RECENT CULTURES:    Meds: mycophenolate mofetil 1 Gram(s) Oral <User Schedule>  nystatin    Suspension 623807 Unit(s) Swish and Swallow four times a day  tacrolimus ER Tablet (ENVARSUS XR) 8 milliGRAM(s) Oral <User Schedule>  trimethoprim   80 mG/sulfamethoxazole 400 mG 1 Tablet(s) Oral daily  valGANciclovir 450 milliGRAM(s) Oral daily    ENDOCRINE  CAPILLARY BLOOD GLUCOSE    POCT Blood Glucose.: 112 mg/dL (17 Jul 2023 22:18)  POCT Blood Glucose.: 113 mg/dL (17 Jul 2023 15:10)  POCT Blood Glucose.: 92 mg/dL (17 Jul 2023 11:04)  POCT Blood Glucose.: 92 mg/dL (17 Jul 2023 09:55)  POCT Blood Glucose.: 46 mg/dL (17 Jul 2023 09:54)  POCT Blood Glucose.: 99 mg/dL (17 Jul 2023 09:04)  POCT Blood Glucose.: 109 mg/dL (17 Jul 2023 08:20)  POCT Blood Glucose.: 129 mg/dL (17 Jul 2023 07:01)  POCT Blood Glucose.: 126 mg/dL (17 Jul 2023 06:04)  POCT Blood Glucose.: 135 mg/dL (17 Jul 2023 05:02)  POCT Blood Glucose.: 160 mg/dL (17 Jul 2023 04:05)  POCT Blood Glucose.: 205 mg/dL (17 Jul 2023 02:54)  POCT Blood Glucose.: 148 mg/dL (17 Jul 2023 02:06)  POCT Blood Glucose.: 182 mg/dL (17 Jul 2023 01:04)    Meds: atorvastatin 40 milliGRAM(s) Oral at bedtime  insulin lispro (ADMELOG) corrective regimen sliding scale   SubCutaneous Before meals and at bedtime  methylPREDNISolone sodium succinate Injectable   IV Push   methylPREDNISolone sodium succinate Injectable 60 milliGRAM(s) IV Push two times a day    OTHER MEDICATIONS:  chlorhexidine 4% Liquid 1 Application(s) Topical <User Schedule>

## 2023-07-19 DIAGNOSIS — D64.9 ANEMIA, UNSPECIFIED: ICD-10-CM

## 2023-07-19 LAB
ANION GAP SERPL CALC-SCNC: 17 MMOL/L — SIGNIFICANT CHANGE UP (ref 5–17)
ANION GAP SERPL CALC-SCNC: 19 MMOL/L — HIGH (ref 5–17)
APTT BLD: 27.2 SEC — LOW (ref 27.5–35.5)
BUN SERPL-MCNC: 62 MG/DL — HIGH (ref 7–23)
BUN SERPL-MCNC: 66 MG/DL — HIGH (ref 7–23)
CALCIUM SERPL-MCNC: 10.1 MG/DL — SIGNIFICANT CHANGE UP (ref 8.4–10.5)
CALCIUM SERPL-MCNC: 10.1 MG/DL — SIGNIFICANT CHANGE UP (ref 8.4–10.5)
CHLORIDE SERPL-SCNC: 95 MMOL/L — LOW (ref 96–108)
CHLORIDE SERPL-SCNC: 96 MMOL/L — SIGNIFICANT CHANGE UP (ref 96–108)
CO2 SERPL-SCNC: 22 MMOL/L — SIGNIFICANT CHANGE UP (ref 22–31)
CO2 SERPL-SCNC: 26 MMOL/L — SIGNIFICANT CHANGE UP (ref 22–31)
CREAT SERPL-MCNC: 6.8 MG/DL — HIGH (ref 0.5–1.3)
CREAT SERPL-MCNC: 7.32 MG/DL — HIGH (ref 0.5–1.3)
EGFR: 8 ML/MIN/1.73M2 — LOW
EGFR: 9 ML/MIN/1.73M2 — LOW
GLUCOSE BLDC GLUCOMTR-MCNC: 132 MG/DL — HIGH (ref 70–99)
GLUCOSE BLDC GLUCOMTR-MCNC: 213 MG/DL — HIGH (ref 70–99)
GLUCOSE BLDC GLUCOMTR-MCNC: 230 MG/DL — HIGH (ref 70–99)
GLUCOSE BLDC GLUCOMTR-MCNC: 259 MG/DL — HIGH (ref 70–99)
GLUCOSE SERPL-MCNC: 196 MG/DL — HIGH (ref 70–99)
GLUCOSE SERPL-MCNC: 237 MG/DL — HIGH (ref 70–99)
HCT VFR BLD CALC: 27.4 % — LOW (ref 39–50)
HGB BLD-MCNC: 8.8 G/DL — LOW (ref 13–17)
INR BLD: 1.09 RATIO — SIGNIFICANT CHANGE UP (ref 0.88–1.16)
MAGNESIUM SERPL-MCNC: 2.4 MG/DL — SIGNIFICANT CHANGE UP (ref 1.6–2.6)
MAGNESIUM SERPL-MCNC: 2.6 MG/DL — SIGNIFICANT CHANGE UP (ref 1.6–2.6)
MCHC RBC-ENTMCNC: 30.2 PG — SIGNIFICANT CHANGE UP (ref 27–34)
MCHC RBC-ENTMCNC: 32.1 GM/DL — SIGNIFICANT CHANGE UP (ref 32–36)
MCV RBC AUTO: 94.2 FL — SIGNIFICANT CHANGE UP (ref 80–100)
NRBC # BLD: 0 /100 WBCS — SIGNIFICANT CHANGE UP (ref 0–0)
PHOSPHATE SERPL-MCNC: 8.2 MG/DL — HIGH (ref 2.5–4.5)
PHOSPHATE SERPL-MCNC: 8.7 MG/DL — HIGH (ref 2.5–4.5)
PLATELET # BLD AUTO: 182 K/UL — SIGNIFICANT CHANGE UP (ref 150–400)
POTASSIUM SERPL-MCNC: 5.1 MMOL/L — SIGNIFICANT CHANGE UP (ref 3.5–5.3)
POTASSIUM SERPL-MCNC: 5.8 MMOL/L — HIGH (ref 3.5–5.3)
POTASSIUM SERPL-SCNC: 5.1 MMOL/L — SIGNIFICANT CHANGE UP (ref 3.5–5.3)
POTASSIUM SERPL-SCNC: 5.8 MMOL/L — HIGH (ref 3.5–5.3)
PROTHROM AB SERPL-ACNC: 12.7 SEC — SIGNIFICANT CHANGE UP (ref 10.5–13.4)
RBC # BLD: 2.91 M/UL — LOW (ref 4.2–5.8)
RBC # FLD: 18 % — HIGH (ref 10.3–14.5)
SODIUM SERPL-SCNC: 137 MMOL/L — SIGNIFICANT CHANGE UP (ref 135–145)
SODIUM SERPL-SCNC: 138 MMOL/L — SIGNIFICANT CHANGE UP (ref 135–145)
TACROLIMUS SERPL-MCNC: 14.9 NG/ML — SIGNIFICANT CHANGE UP
WBC # BLD: 13.22 K/UL — HIGH (ref 3.8–10.5)
WBC # FLD AUTO: 13.22 K/UL — HIGH (ref 3.8–10.5)

## 2023-07-19 PROCEDURE — 76776 US EXAM K TRANSPL W/DOPPLER: CPT | Mod: 26,LT

## 2023-07-19 PROCEDURE — 99223 1ST HOSP IP/OBS HIGH 75: CPT

## 2023-07-19 PROCEDURE — 99232 SBSQ HOSP IP/OBS MODERATE 35: CPT | Mod: 24

## 2023-07-19 PROCEDURE — 99233 SBSQ HOSP IP/OBS HIGH 50: CPT | Mod: GC

## 2023-07-19 RX ORDER — DIPHENHYDRAMINE HCL 50 MG
50 CAPSULE ORAL ONCE
Refills: 0 | Status: COMPLETED | OUTPATIENT
Start: 2023-07-19 | End: 2023-07-19

## 2023-07-19 RX ORDER — VALGANCICLOVIR 450 MG/1
450 TABLET, FILM COATED ORAL
Refills: 0 | Status: DISCONTINUED | OUTPATIENT
Start: 2023-07-19 | End: 2023-07-24

## 2023-07-19 RX ORDER — ERYTHROPOIETIN 10000 [IU]/ML
10000 INJECTION, SOLUTION INTRAVENOUS; SUBCUTANEOUS ONCE
Refills: 0 | Status: COMPLETED | OUTPATIENT
Start: 2023-07-19 | End: 2023-07-19

## 2023-07-19 RX ORDER — ACETAMINOPHEN 500 MG
650 TABLET ORAL ONCE
Refills: 0 | Status: COMPLETED | OUTPATIENT
Start: 2023-07-19 | End: 2023-07-19

## 2023-07-19 RX ORDER — LACTULOSE 10 G/15ML
20 SOLUTION ORAL ONCE
Refills: 0 | Status: COMPLETED | OUTPATIENT
Start: 2023-07-19 | End: 2023-07-19

## 2023-07-19 RX ORDER — INSULIN LISPRO 100/ML
5 VIAL (ML) SUBCUTANEOUS
Refills: 0 | Status: DISCONTINUED | OUTPATIENT
Start: 2023-07-19 | End: 2023-07-21

## 2023-07-19 RX ORDER — INSULIN GLARGINE 100 [IU]/ML
15 INJECTION, SOLUTION SUBCUTANEOUS AT BEDTIME
Refills: 0 | Status: DISCONTINUED | OUTPATIENT
Start: 2023-07-19 | End: 2023-07-21

## 2023-07-19 RX ADMIN — ERYTHROPOIETIN 10000 UNIT(S): 10000 INJECTION, SOLUTION INTRAVENOUS; SUBCUTANEOUS at 19:29

## 2023-07-19 RX ADMIN — POLYETHYLENE GLYCOL 3350 17 GRAM(S): 17 POWDER, FOR SOLUTION ORAL at 05:11

## 2023-07-19 RX ADMIN — Medication 5 UNIT(S): at 18:25

## 2023-07-19 RX ADMIN — Medication 650 MILLIGRAM(S): at 14:54

## 2023-07-19 RX ADMIN — Medication 500000 UNIT(S): at 00:27

## 2023-07-19 RX ADMIN — Medication 125 MILLIGRAM(S): at 13:54

## 2023-07-19 RX ADMIN — Medication 650 MILLIGRAM(S): at 13:54

## 2023-07-19 RX ADMIN — Medication 30 MILLIGRAM(S): at 05:11

## 2023-07-19 RX ADMIN — SEVELAMER CARBONATE 1600 MILLIGRAM(S): 2400 POWDER, FOR SUSPENSION ORAL at 12:53

## 2023-07-19 RX ADMIN — VALGANCICLOVIR 450 MILLIGRAM(S): 450 TABLET, FILM COATED ORAL at 12:52

## 2023-07-19 RX ADMIN — MYCOPHENOLATE MOFETIL 1 GRAM(S): 250 CAPSULE ORAL at 20:25

## 2023-07-19 RX ADMIN — Medication 4: at 18:25

## 2023-07-19 RX ADMIN — Medication 1 TABLET(S): at 12:52

## 2023-07-19 RX ADMIN — ATORVASTATIN CALCIUM 40 MILLIGRAM(S): 80 TABLET, FILM COATED ORAL at 22:32

## 2023-07-19 RX ADMIN — CARVEDILOL PHOSPHATE 6.25 MILLIGRAM(S): 80 CAPSULE, EXTENDED RELEASE ORAL at 05:10

## 2023-07-19 RX ADMIN — Medication 81 MILLIGRAM(S): at 12:52

## 2023-07-19 RX ADMIN — Medication 50 MILLIGRAM(S): at 13:54

## 2023-07-19 RX ADMIN — CARVEDILOL PHOSPHATE 6.25 MILLIGRAM(S): 80 CAPSULE, EXTENDED RELEASE ORAL at 17:24

## 2023-07-19 RX ADMIN — Medication 6: at 13:20

## 2023-07-19 RX ADMIN — Medication 500000 UNIT(S): at 05:10

## 2023-07-19 RX ADMIN — SEVELAMER CARBONATE 1600 MILLIGRAM(S): 2400 POWDER, FOR SUSPENSION ORAL at 17:24

## 2023-07-19 RX ADMIN — SENNA PLUS 2 TABLET(S): 8.6 TABLET ORAL at 22:32

## 2023-07-19 RX ADMIN — INSULIN GLARGINE 15 UNIT(S): 100 INJECTION, SOLUTION SUBCUTANEOUS at 22:32

## 2023-07-19 RX ADMIN — Medication 500000 UNIT(S): at 17:24

## 2023-07-19 RX ADMIN — Medication 4: at 08:21

## 2023-07-19 RX ADMIN — SEVELAMER CARBONATE 1600 MILLIGRAM(S): 2400 POWDER, FOR SUSPENSION ORAL at 08:11

## 2023-07-19 RX ADMIN — Medication 500000 UNIT(S): at 22:32

## 2023-07-19 RX ADMIN — Medication 500000 UNIT(S): at 12:51

## 2023-07-19 RX ADMIN — MYCOPHENOLATE MOFETIL 1 GRAM(S): 250 CAPSULE ORAL at 08:11

## 2023-07-19 RX ADMIN — FAMOTIDINE 20 MILLIGRAM(S): 10 INJECTION INTRAVENOUS at 12:52

## 2023-07-19 NOTE — PROGRESS NOTE ADULT - PROBLEM SELECTOR PLAN 4
Pt. with anemia in the setting of ESRD/DGF. Hgb is low at 8.8 today. Recommend EPO qweekly. Monitor hgb.    If you have any questions, please feel free to contact me  Enrique Nagel  Nephrology Fellow  [phone] / [employer] Teams(Preferred)  (After 5pm or on weekends please page the on-call fellow)

## 2023-07-19 NOTE — PROGRESS NOTE ADULT - SUBJECTIVE AND OBJECTIVE BOX
Subjective: Patient seen and examined. No new events except as noted.     SUBJECTIVE/ROS:    No chest pain, dyspnea, palpitation, or dizziness.     MEDICATIONS:  MEDICATIONS  (STANDING):  aspirin  chewable 81 milliGRAM(s) Oral daily  atorvastatin 40 milliGRAM(s) Oral at bedtime  carvedilol 6.25 milliGRAM(s) Oral every 12 hours  chlorhexidine 4% Liquid 1 Application(s) Topical <User Schedule>  epoetin arian (EPOGEN) Injectable 67139 Unit(s) SubCutaneous once  famotidine    Tablet 20 milliGRAM(s) Oral daily  insulin glargine Injectable (LANTUS) 12 Unit(s) SubCutaneous at bedtime  insulin lispro (ADMELOG) corrective regimen sliding scale   SubCutaneous Before meals and at bedtime  methylPREDNISolone sodium succinate Injectable   IV Push   methylPREDNISolone sodium succinate Injectable 30 milliGRAM(s) IV Push two times a day  mycophenolate mofetil 1 Gram(s) Oral <User Schedule>  nystatin    Suspension 921855 Unit(s) Swish and Swallow four times a day  polyethylene glycol 3350 17 Gram(s) Oral two times a day  senna 2 Tablet(s) Oral at bedtime  sevelamer carbonate 1600 milliGRAM(s) Oral three times a day with meals  trimethoprim   80 mG/sulfamethoxazole 400 mG 1 Tablet(s) Oral daily  valGANciclovir 450 milliGRAM(s) Oral <User Schedule>      PHYSICAL EXAM:  T(C): 36.9 (07-19-23 @ 05:00), Max: 36.9 (07-19-23 @ 05:00)  HR: 94 (07-19-23 @ 05:00) (76 - 99)  BP: 145/70 (07-19-23 @ 05:00) (128/60 - 186/78)  RR: 18 (07-19-23 @ 05:00) (13 - 43)  SpO2: 98% (07-19-23 @ 05:00) (96% - 100%)  Wt(kg): --  I&O's Summary    18 Jul 2023 07:01  -  19 Jul 2023 07:00  --------------------------------------------------------  IN: 1360 mL / OUT: 240 mL / NET: 1120 mL            JVP: Normal  Neck: supple  Lung: clear   CV: S1 S2 , Murmur:  Abd: soft  Ext: No edema  neuro: Awake / alert  Psych: flat affect  Skin: normal``    LABS/DATA:    CARDIAC MARKERS:                                8.8    13.22 )-----------( 182      ( 19 Jul 2023 07:06 )             27.4     07-19    138  |  95<L>  |  66<H>  ----------------------------<  196<H>  5.8<H>   |  26  |  7.32<H>    Ca    10.1      19 Jul 2023 07:03  Phos  8.7     07-19  Mg     2.6     07-19      proBNP:   Lipid Profile:   HgA1c:   TSH:     TELE:  EKG:

## 2023-07-19 NOTE — PROGRESS NOTE ADULT - ASSESSMENT
52 yo M with h/o ESRD on HD x6 years via LUE AVF (outpatient nephrologist: Dr. Bharath Romo), DM2 CAD, CHF, underwent CABG 2015, AICD (2016), and severe PVD (underwent R big toe and second toe amputation in 2021) initially presented for kidney transplant. Pt. underwent DDRT on 7/16. Pt. was transferred to SICU for IV vasopressor support, now off and transferred to medical floor. Post-transplant course significant for DGF. Pt. received first HD treatment on 7/17.

## 2023-07-19 NOTE — PROGRESS NOTE ADULT - SUBJECTIVE AND OBJECTIVE BOX
54 yo M with hx of DM2 CAD, CHF, s/p CABG 2015, AICD (2016), on hemodialysis for approximately 6 years via L AVF (nephrologist Dr. Bharath Romo), He has hx of PVD, is s/p 4 stents in R leg (mid and distal right superficial femoral artery, right popliteal x2 on 1/14/22).; is sp RT big toe and second toe amputation 2021, on asa and plavix for severe PVD (per pt).   Now here for DDRT. Pt reports dose not make urine only few drops occasionally. Pt denies N/V/D, fevers/chills, CP, SOB. Pt reports last ate at 4pm today and had HD yesterday 7/14/23.    Underwent L DDRT with CIT 16hrs, 1A,1V,1U. Started on insulin drip intraoperatively for hyperkalemia after reperfusion. He was started on pressors (Levo - 0.04) intraoperatively and received 2U PRBC. Got Simulect and solumedrol for induction.     Interval events:   POD#2, remains in SICU, HDN stable without pressor requirements. Off insulin gtt.   Got 1 unit of PRBC yesterday 7/17 with good response.  Urine output still minimal hematuria  LIANA output remains serosanguinous  Graft ultrasound - well perfused kidney with no obvious renal artery stenosis. High normal velocities. Repeat US this morning with lower resistive index and small perinephric collection, no hydronephrosis.   ECHOcardiogram without major abnormalities.   S/P sesion of hemodialisis 7/17 net 0. Left hand edema after HD without numbness/tingling.  Aspirin restarted      MEDICATIONS  (STANDING):  acetaminophen     Tablet .. 650 milliGRAM(s) Oral once  antithymocyte globulin rabbit (peripheral) IVPB w/additives 150 milliGRAM(s) IV Intermittent once  aspirin  chewable 81 milliGRAM(s) Oral daily  atorvastatin 40 milliGRAM(s) Oral at bedtime  carvedilol 6.25 milliGRAM(s) Oral every 12 hours  chlorhexidine 4% Liquid 1 Application(s) Topical <User Schedule>  diphenhydrAMINE 50 milliGRAM(s) Oral once  epoetin arian (EPOGEN) Injectable 16284 Unit(s) SubCutaneous once  famotidine    Tablet 20 milliGRAM(s) Oral daily  insulin glargine Injectable (LANTUS) 15 Unit(s) SubCutaneous at bedtime  insulin lispro (ADMELOG) corrective regimen sliding scale   SubCutaneous Before meals and at bedtime  insulin lispro Injectable (ADMELOG) 5 Unit(s) SubCutaneous three times a day before meals  methylPREDNISolone sodium succinate Injectable 125 milliGRAM(s) IV Push once  mycophenolate mofetil 1 Gram(s) Oral <User Schedule>  nystatin    Suspension 242763 Unit(s) Swish and Swallow four times a day  polyethylene glycol 3350 17 Gram(s) Oral two times a day  senna 2 Tablet(s) Oral at bedtime  sevelamer carbonate 1600 milliGRAM(s) Oral three times a day with meals  trimethoprim   80 mG/sulfamethoxazole 400 mG 1 Tablet(s) Oral daily  valGANciclovir 450 milliGRAM(s) Oral <User Schedule>    MEDICATIONS  (PRN):  HYDROmorphone  Injectable 0.5 milliGRAM(s) IV Push every 3 hours PRN breakthrough pain  sodium chloride 0.9% lock flush 10 milliLiter(s) IV Push every 1 hour PRN Pre/post blood products, medications, blood draw, and to maintain line patency  traMADol 25 milliGRAM(s) Oral every 6 hours PRN Moderate Pain (4 - 6)  traMADol 50 milliGRAM(s) Oral every 6 hours PRN Severe Pain (7 - 10)    PAST MEDICAL & SURGICAL HISTORY:  Diabetes mellitus  type 2  Foot ulcer due to secondary DM  Coronary artery disease  Acute on chronic systolic heart failure  Breast Reduction  at age 18  Toe amputation status, left  S/P CABG (coronary artery bypass graft)  AICD (automatic cardioverter/defibrillator) present    Vital Signs Last 24 Hrs  T(C): 36.4 (19 Jul 2023 09:45), Max: 36.9 (19 Jul 2023 05:00)  T(F): 97.6 (19 Jul 2023 09:45), Max: 98.5 (19 Jul 2023 05:00)  HR: 87 (19 Jul 2023 09:45) (76 - 99)  BP: 143/79 (19 Jul 2023 09:45) (128/60 - 186/78)  BP(mean): 100 (19 Jul 2023 05:00) (82 - 112)  RR: 20 (19 Jul 2023 09:45) (13 - 43)  SpO2: 97% (19 Jul 2023 09:45) (96% - 100%)  Parameters below as of 19 Jul 2023 09:45  Patient On (Oxygen Delivery Method): room air    I&O's Summary  18 Jul 2023 07:01  -  19 Jul 2023 07:00  --------------------------------------------------------  IN: 1360 mL / OUT: 240 mL / NET: 1120 mL    19 Jul 2023 07:01  -  19 Jul 2023 13:29  --------------------------------------------------------  IN: 0 mL / OUT: 5 mL / NET: -5 mL                        8.8    13.22 )-----------( 182      ( 19 Jul 2023 07:06 )             27.4     07-19    138  |  95<L>  |  66<H>  ----------------------------<  196<H>  5.8<H>   |  26  |  7.32<H>    Ca    10.1      19 Jul 2023 07:03  Phos  8.7     07-19  Mg     2.6     07-19    Tacrolimus (), Serum: 14.9 ng/mL (07-19 @ 07:06)    PHYSICAL EXAM:  GENERAL: NAD, well-groomed, well-developed  HEAD:  Atraumatic, Normocephalic  EYES: EOMI, PERRLA, conjunctiva and sclera clear  NECK: Supple, No JVD, L IJ TLC CDI  CHEST/LUNG: non-labored breathing on room air.   HEART: Regular rate and rhythm; S1/S2  ABDOMEN: Soft, Non-distended, LLQ incision with staples in place healing well. Miles drain with bulb suction SS output, no signs of infection.  : granda in place with hematuria.  VASCULAR: Normal pulses, Normal capillary refill  EXTREMITIES:  2+ Peripheral Pulses, No cyanosis, L hand edema  SKIN: Warm, Intact 52 yo M with hx of DM2 CAD, CHF, s/p CABG 2015, AICD (2016), on hemodialysis for approximately 6 years via L AVF (nephrologist Dr. Bharath Romo), He has hx of PVD, is s/p 4 stents in R leg (mid and distal right superficial femoral artery, right popliteal x2 on 1/14/22).; is sp RT big toe and second toe amputation 2021, on asa and plavix for severe PVD (per pt).   Now here for DDRT. Pt reports dose not make urine only few drops occasionally. Pt denies N/V/D, fevers/chills, CP, SOB. Pt reports last ate at 4pm today and had HD yesterday 7/14/23.    Underwent L DDRT with CIT 16hrs, 1A,1V,1U. Started on insulin drip intraoperatively for hyperkalemia after reperfusion. He was started on pressors (Levo - 0.04) intraoperatively and received 2U PRBC. Got Simulect and solumedrol for induction.     Interval events:   - LUE swelling. Vascular evaluation pending,.   - Pt. is oliguric, documented urine output is 80 cc over the past 24 hours.  - Plan for HD.   - Hb 8.8 on am labs.   - VSS stable, Afebrile.   - LIANA output remains serosanguinous  - Graft ultrasound - well perfused kidney with no obvious renal artery stenosis. High normal velocities. Repeat US this morning with lower resistive index and small perinephric collection, no hydronephrosis.     MEDICATIONS  (STANDING):  acetaminophen     Tablet .. 650 milliGRAM(s) Oral once  antithymocyte globulin rabbit (peripheral) IVPB w/additives 150 milliGRAM(s) IV Intermittent once  aspirin  chewable 81 milliGRAM(s) Oral daily  atorvastatin 40 milliGRAM(s) Oral at bedtime  carvedilol 6.25 milliGRAM(s) Oral every 12 hours  chlorhexidine 4% Liquid 1 Application(s) Topical <User Schedule>  diphenhydrAMINE 50 milliGRAM(s) Oral once  epoetin arian (EPOGEN) Injectable 45736 Unit(s) SubCutaneous once  famotidine    Tablet 20 milliGRAM(s) Oral daily  insulin glargine Injectable (LANTUS) 15 Unit(s) SubCutaneous at bedtime  insulin lispro (ADMELOG) corrective regimen sliding scale   SubCutaneous Before meals and at bedtime  insulin lispro Injectable (ADMELOG) 5 Unit(s) SubCutaneous three times a day before meals  methylPREDNISolone sodium succinate Injectable 125 milliGRAM(s) IV Push once  mycophenolate mofetil 1 Gram(s) Oral <User Schedule>  nystatin    Suspension 318042 Unit(s) Swish and Swallow four times a day  polyethylene glycol 3350 17 Gram(s) Oral two times a day  senna 2 Tablet(s) Oral at bedtime  sevelamer carbonate 1600 milliGRAM(s) Oral three times a day with meals  trimethoprim   80 mG/sulfamethoxazole 400 mG 1 Tablet(s) Oral daily  valGANciclovir 450 milliGRAM(s) Oral <User Schedule>    MEDICATIONS  (PRN):  HYDROmorphone  Injectable 0.5 milliGRAM(s) IV Push every 3 hours PRN breakthrough pain  sodium chloride 0.9% lock flush 10 milliLiter(s) IV Push every 1 hour PRN Pre/post blood products, medications, blood draw, and to maintain line patency  traMADol 25 milliGRAM(s) Oral every 6 hours PRN Moderate Pain (4 - 6)  traMADol 50 milliGRAM(s) Oral every 6 hours PRN Severe Pain (7 - 10)    PAST MEDICAL & SURGICAL HISTORY:  Diabetes mellitus  type 2  Foot ulcer due to secondary DM  Coronary artery disease  Acute on chronic systolic heart failure  Breast Reduction  at age 18  Toe amputation status, left  S/P CABG (coronary artery bypass graft)  AICD (automatic cardioverter/defibrillator) present    Vital Signs Last 24 Hrs  T(C): 36.4 (19 Jul 2023 09:45), Max: 36.9 (19 Jul 2023 05:00)  T(F): 97.6 (19 Jul 2023 09:45), Max: 98.5 (19 Jul 2023 05:00)  HR: 87 (19 Jul 2023 09:45) (76 - 99)  BP: 143/79 (19 Jul 2023 09:45) (128/60 - 186/78)  BP(mean): 100 (19 Jul 2023 05:00) (82 - 112)  RR: 20 (19 Jul 2023 09:45) (13 - 43)  SpO2: 97% (19 Jul 2023 09:45) (96% - 100%)  Parameters below as of 19 Jul 2023 09:45  Patient On (Oxygen Delivery Method): room air    I&O's Summary  18 Jul 2023 07:01  -  19 Jul 2023 07:00  --------------------------------------------------------  IN: 1360 mL / OUT: 240 mL / NET: 1120 mL    19 Jul 2023 07:01  -  19 Jul 2023 13:29  --------------------------------------------------------  IN: 0 mL / OUT: 5 mL / NET: -5 mL                        8.8    13.22 )-----------( 182      ( 19 Jul 2023 07:06 )             27.4     07-19    138  |  95<L>  |  66<H>  ----------------------------<  196<H>  5.8<H>   |  26  |  7.32<H>    Ca    10.1      19 Jul 2023 07:03  Phos  8.7     07-19  Mg     2.6     07-19    Tacrolimus (), Serum: 14.9 ng/mL (07-19 @ 07:06)    PHYSICAL EXAM:  GENERAL: NAD, well-groomed, well-developed  HEAD:  Atraumatic, Normocephalic  EYES: EOMI, PERRLA, conjunctiva and sclera clear  NECK: Supple, No JVD, L IJ TLC CDI  CHEST/LUNG: non-labored breathing on room air.   HEART: Regular rate and rhythm; S1/S2  ABDOMEN: Soft, Non-distended, LLQ incision with staples in place healing well. Miles drain with bulb suction SS output, no signs of infection.  : granda in place with hematuria.  VASCULAR: Normal pulses, Normal capillary refill  EXTREMITIES:  2+ Peripheral Pulses, No cyanosis, L hand edema  SKIN: Warm, Intact Transplant Surgery - Multidisciplinary Progress Note  --------------------------------------------------------------  DDRT 7/16/23 POD#3    Pt seen and examined by the multidisciplinary team: Surgery: Dr. Muñoz   and Nephrology: Dr. Aguilar and  SHERIE Chris/Vinod/Julissa unit RN. Rest of disciplines not in attendance to be notified of plan    52 yo M with hx of DM2 CAD, CHF, s/p CABG 2015, AICD (2016), on hemodialysis for approximately 6 years via L AVF (nephrologist Dr. Bharath Romo), He has hx of PVD, is s/p 4 stents in R leg (mid and distal right superficial femoral artery, right popliteal x2 on 1/14/22).; is sp RT big toe and second toe amputation 2021, on asa and plavix for severe PVD (per pt).   Now here for DDRT. Pt reports dose not make urine only few drops occasionally. Pt denies N/V/D, fevers/chills, CP, SOB. Pt reports last ate at 4pm today and had HD yesterday 7/14/23.    Underwent L DDRT with CIT 16hrs, 1A,1V,1U. Started on insulin drip intraoperatively for hyperkalemia after reperfusion. He was started on pressors (Levo - 0.04) intraoperatively and received 2U PRBC. Got Simulect and solumedrol for induction.     Interval events:   - LUE swelling. Vascular evaluation pending,.   - Pt. is oliguric, documented urine output is 80 cc over the past 24 hours.  - Plan for HD.   - VSS stable, Afebrile.   - LIANA output remains serosanguinous  - Graft ultrasound pending.     MEDICATIONS  (STANDING):  acetaminophen     Tablet .. 650 milliGRAM(s) Oral once  antithymocyte globulin rabbit (peripheral) IVPB w/additives 150 milliGRAM(s) IV Intermittent once  aspirin  chewable 81 milliGRAM(s) Oral daily  atorvastatin 40 milliGRAM(s) Oral at bedtime  carvedilol 6.25 milliGRAM(s) Oral every 12 hours  chlorhexidine 4% Liquid 1 Application(s) Topical <User Schedule>  diphenhydrAMINE 50 milliGRAM(s) Oral once  epoetin arian (EPOGEN) Injectable 75119 Unit(s) SubCutaneous once  famotidine    Tablet 20 milliGRAM(s) Oral daily  insulin glargine Injectable (LANTUS) 15 Unit(s) SubCutaneous at bedtime  insulin lispro (ADMELOG) corrective regimen sliding scale   SubCutaneous Before meals and at bedtime  insulin lispro Injectable (ADMELOG) 5 Unit(s) SubCutaneous three times a day before meals  methylPREDNISolone sodium succinate Injectable 125 milliGRAM(s) IV Push once  mycophenolate mofetil 1 Gram(s) Oral <User Schedule>  nystatin    Suspension 258322 Unit(s) Swish and Swallow four times a day  polyethylene glycol 3350 17 Gram(s) Oral two times a day  senna 2 Tablet(s) Oral at bedtime  sevelamer carbonate 1600 milliGRAM(s) Oral three times a day with meals  trimethoprim   80 mG/sulfamethoxazole 400 mG 1 Tablet(s) Oral daily  valGANciclovir 450 milliGRAM(s) Oral <User Schedule>    MEDICATIONS  (PRN):  HYDROmorphone  Injectable 0.5 milliGRAM(s) IV Push every 3 hours PRN breakthrough pain  sodium chloride 0.9% lock flush 10 milliLiter(s) IV Push every 1 hour PRN Pre/post blood products, medications, blood draw, and to maintain line patency  traMADol 25 milliGRAM(s) Oral every 6 hours PRN Moderate Pain (4 - 6)  traMADol 50 milliGRAM(s) Oral every 6 hours PRN Severe Pain (7 - 10)    PAST MEDICAL & SURGICAL HISTORY:  Diabetes mellitus  type 2  Foot ulcer due to secondary DM  Coronary artery disease  Acute on chronic systolic heart failure  Breast Reduction  at age 18  Toe amputation status, left  S/P CABG (coronary artery bypass graft)  AICD (automatic cardioverter/defibrillator) present    Vital Signs Last 24 Hrs  T(C): 36.4 (19 Jul 2023 09:45), Max: 36.9 (19 Jul 2023 05:00)  T(F): 97.6 (19 Jul 2023 09:45), Max: 98.5 (19 Jul 2023 05:00)  HR: 87 (19 Jul 2023 09:45) (76 - 99)  BP: 143/79 (19 Jul 2023 09:45) (128/60 - 186/78)  BP(mean): 100 (19 Jul 2023 05:00) (82 - 112)  RR: 20 (19 Jul 2023 09:45) (13 - 43)  SpO2: 97% (19 Jul 2023 09:45) (96% - 100%)  Parameters below as of 19 Jul 2023 09:45  Patient On (Oxygen Delivery Method): room air    I&O's Summary  18 Jul 2023 07:01  -  19 Jul 2023 07:00  --------------------------------------------------------  IN: 1360 mL / OUT: 240 mL / NET: 1120 mL    19 Jul 2023 07:01  -  19 Jul 2023 13:29  --------------------------------------------------------  IN: 0 mL / OUT: 5 mL / NET: -5 mL                        8.8    13.22 )-----------( 182      ( 19 Jul 2023 07:06 )             27.4     07-19    138  |  95<L>  |  66<H>  ----------------------------<  196<H>  5.8<H>   |  26  |  7.32<H>    Ca    10.1      19 Jul 2023 07:03  Phos  8.7     07-19  Mg     2.6     07-19    Tacrolimus (), Serum: 14.9 ng/mL (07-19 @ 07:06)    PHYSICAL EXAM:  GENERAL: NAD, well-groomed, well-developed  HEAD:  Atraumatic, Normocephalic  EYES: EOMI, PERRLA, conjunctiva and sclera clear  NECK: Supple, No JVD, L IJ TLC CDI  CHEST/LUNG: non-labored breathing on room air.   HEART: Regular rate and rhythm; S1/S2  ABDOMEN: Soft, Non-distended, LLQ incision with staples in place healing well. Miles drain with bulb suction SS output, no signs of infection.  : granda in place.   VASCULAR: Normal pulses, Normal capillary refill  EXTREMITIES:  2+ Peripheral Pulses, No cyanosis, L hand edema, LE edema b/l. AVF with + thrill.   SKIN: Warm, Intact

## 2023-07-19 NOTE — CONSULT NOTE ADULT - ASSESSMENT
A: Patient now s/p renal transplant on 7/16. Patient reports LUE swelling since monday 7/17. Recent duplex showed LUE subclavian thombosis in the setting of this new LUE swelling. Patient with distal LUE fistula. Vascular Surgery consulted for management of subclavian thrombosis.     Plan:   -Recommend elevation  -Recommend therapeutic AC  -patient may need CTV to further eval swelling, but in setting of recent kidney transplant, would trial AC and elevation. patient does not need compression as it may affect fistula. Will reconsider/weigh risks/benefits if patient fails to improve    Patient and plan discussed with fellow on behalf of attending    Whatley Donaldson  Vascular Surgery  [id number]

## 2023-07-19 NOTE — PROGRESS NOTE ADULT - ASSESSMENT
54 yo M with hx of DM2 CAD, CHF, s/p CABG 2015, AICD (2016), on hemodialysis for approximately 6 years via L AVF (nephrologist Dr. Bharath Romo), He has hx of PVD, is s/p 4 stents in R leg (mid and distal right superficial femoral artery, right popliteal x2 on 1/14/22).; is sp RT big toe and second toe amputation 2021, on asa and plavix for severe PVD (per pt).   Now here for DDRT. Pt reports dose not make urine only few drops occasionally. Pt denies N/V/D, fevers/chills, CP, SOB. Pt reports last ate at 4pm today and had HD yesterday 7/14/23.    Underwent L DDRT with CIT 16hrs, 1A,1V,1U. Started on insulin drip intraoperatively for hyperkalemia after reperfusion. He was started on pressors (Levo - 0.004) intraoperatively and received 2U PRBC. Got Simulect and solumedrol for induction.     Plan:    POD#3- L DDRT  Remains HDN stable,   Monitor urine output, creatinine  Renal US without major changes, will continue to monitor urine output.   Monitor LIANA output, keep bulb suction, currently serosanguineous  Restarted on home aspirin, would hold of on restarting plavix for now until 24 hr of stable hgb. Last transfusion 7/17  Continue diabetic diet.   L UE US:   Pt. is oliguric, documented urine output is 80 cc over the past 24 hours.  Plan for HD treatment today with 2.5L UF  Hgb is low at 8.8 today. Recommend EPO qweekly    Immunosuppression:   - Switched from Simulect to Thymoglobulin.   - With SoluMedrol Taper.   - MMF, Methylpred taper, Env by level  - Valcyte to every other day (MWF)     Cardiovascular:  - TTE without major changes  - ardiology note appreciated    DM:   - Monitor FS, patient now on ISS  - Get A1C, patient was not on insulin as outpatient and might need to start insulin on DC    PAD:   - Aspirin restarted  - Plavix held     LUE swelling.   - LUE US --> oozed from LUE AVF HD puncture site- dressing applied, prelime with SC stenosis vs thrombus (known per patient)   - Vascular evaluation of AVF.               54 yo M with hx of DM2 CAD, CHF, s/p CABG 2015, AICD (2016), on hemodialysis for approximately 6 years via L AVF (nephrologist Dr. Bharath Romo), He has hx of PVD, is s/p 4 stents in R leg (mid and distal right superficial femoral artery, right popliteal x2 on 1/14/22).; is sp RT big toe and second toe amputation 2021, on asa and plavix for severe PVD (per pt).   Now here for DDRT. Pt reports dose not make urine only few drops occasionally. Pt denies N/V/D, fevers/chills, CP, SOB. Pt reports last ate at 4pm today and had HD yesterday 7/14/23.    Underwent L DDRT with CIT 16hrs, 1A,1V,1U. Started on insulin drip intraoperatively for hyperkalemia after reperfusion. He was started on pressors (Levo - 0.004) intraoperatively and received 2U PRBC. Got Simulect and solumedrol for induction.     Plan:    POD#3- L DDRT  DGF---> Plan for HD treatment today with 2.5L UF+  Check repeat graft US.   Monitor urine output, creatinine, LIANA.   Restarted on home aspirin, would hold of on restarting plavix for now until 24 hr of stable hgb. Last transfusion 7/17, Start EPO.  Continue diabetic diet.    Immunosuppression:   - Switched from Simulect to Thymoglobulin.   - MMF, Methylpred taper, Env by level  - Valcyte to every other day (MWF)     DM:   - Monitor FS, patient now on ISS  - Started pre meal and Lantus.     PAD:   - Aspirin restarted  - Plavix held     LUE swelling.   - LUE US : prelim with SC stenosis vs thrombus (known per patient)   - Vascular evaluation of AVF.

## 2023-07-19 NOTE — PROGRESS NOTE ADULT - PROBLEM SELECTOR PLAN 3
Pt. with hyperkalemia in the setting of ESRD and recent DDRT with DGF. Serum potassium increased to 5.8 today. Recommend Lokelma 10 gm once. Plan for HD today with 2K bath. Monitor serum potassium.    If you have any questions, please feel free to contact me  Enrique Nagel  Nephrology Fellow  [phone] / [employer] Teams(Preferred)  (After 5pm or on weekends please page the on-call fellow) Pt. with hyperkalemia in the setting of ESRD and recent DDRT with DGF. Serum potassium increased to 5.8 today. Recommend Lokelma 10 gm once. Plan for HD today with 2K bath. Monitor serum potassium.

## 2023-07-19 NOTE — PROGRESS NOTE ADULT - ASSESSMENT
52 yo M with hx of DM2 CAD, CHF, s/p CABG 2015, AICD (2016), on hemodialysis for approximately 6 years via L AVF (nephrologist Dr. Bharath Romo), He has hx of PVD, is s/p 4 stents in R leg (mid and distal right superficial femoral artery, right popliteal x2 on 1/14/22).; is sp RT big toe and second toe amputation 2021, on asa and plavix for severe PVD (per pt). Now here for possible DDRT. Pt reports does not make urine only few drops occasionally. Pt denies N/V/D, fevers/chills, CP, SOB. Pt reports last ate at 4pm today and had HD yesterday 7/14/23.  Nephrology consulted for ESRD s/p DDRT.      ESRD on HD [facility]  Nephrologist : Dr. Thomas / DR Brooke  at [facility]   s/p DDRT 7/16   s/p HD 7/17   HD again today per Transplant team   HD consent in chart.   Renal diet   Monitor     s/p DDRT @ [facility] 7/16/2023   Underwent L DDRT 7/13/2023 s/p  Simulect and solumedrol for induction.   Started on insulin drip intraoperatively for hyperkalemia after reperfusion and transferred to SICU   Monitor BMP, urine output   Pt oliguric ~ 25 cc urine output over 24 hrs   s/p Lasix 80 mg IVP today per Transplant   s/p HD 7/17   Management per Transplant  HD again today sec to DGF, plan for 2.5 L UF   repeat graft US pending   Continue immunosuppressants per transplant : Envarsus by level, Cellcept 1 gm BID, and prednisone taper. FK level 14.9 7/19  Valcyte to every other day (MWF)   Check tacrolimus trough 30 minutes prior to AM dose    Hyperkalemia   in setting of ESRD and recent DDRT with sub-optimally functioning allograft.   s/p HD 7/17   K elevated today, low K diet   HD today   Mgmt per Transplant  Monitor     HTN:   BP fluctuates   Monitor     Hyperphosphatemia  Low PO4 diet  Continue Phoslo   Monitor PO4 daily

## 2023-07-19 NOTE — PROGRESS NOTE ADULT - ASSESSMENT
CAD s/p cabg  stable   asa, statin    chronic systolic chf  stable  improving LV function   cont coreg   off ace/arb/arni for now   s/p ICD  interrogation noted     ESRD  s/p transplant   fu with transplant team

## 2023-07-19 NOTE — CONSULT NOTE ADULT - ATTENDING COMMENTS
Patient seen/examined. S/p renal transplant with history of ESRD on HD via left radiocephalic AVF. Patient reports history of prior LUE fistulograms at [facility]. Patient developed new LUE edema on 7/17/23. Duplex showed patent fistula with left SCV thrombosis and collateral chest outflow. On exam, LUE edematous, fistula with thrill and palpable radial and brachial arteries.   -Patient in need of HD access at this time as transplant kidney not yet functional  -D/w Dr. Muñoz and patient may receive contrast at this time for fistulogram  -Will require pre-medication for contrast allergy  -Patient scheduled for fistulogram on 7/24/23 with Dr. Daly

## 2023-07-19 NOTE — PROGRESS NOTE ADULT - ASSESSMENT
Patient is a 53y Male  with hx of DM2 CAD, CHF, s/p CABG 2015, AICD (2016), on hemodialysis for approximately 6 years via L AVF (nephrologist Dr. Bharath Romo), He has hx of PVD, is s/p 4 stents in R leg (mid and distal right superficial femoral artery, right popliteal x2 on 1/14/22).  s/p  RT big toe and second toe amputation 2021, on asa and plavix for severe PVD (per pt).   Patient is s/p DDRT 7/16, requiring levophed drip and insulin drip post op with hyperkalemia, transferred to SICU         CVS: Post op hypotension  improved  no pressors   on regular floor    - diet as tolerated  - Bowel regimen     Renal: s/p DDRT for ESRD 2/2 DM  -has left arm fistula,   hyperkalemia  hd as per renal  - care per renal transplant regarding: tc / meds  - Monitor I&Os and electrolytes  - replete electrolytes as needed   s/p hd on 7/17 /for hd today       Heme: stable h/h  - Monitor CBC and coags  -  VTE prophylaxis: hold  -SCD's        Endo: hx of DM,   - Hyperglycemia requring insulin drip d/c d    now fsg riss    steroid taper     -oob

## 2023-07-19 NOTE — PROGRESS NOTE ADULT - SUBJECTIVE AND OBJECTIVE BOX
[facility] [place] [facility]PHROLOGY PRACT[facility]   MD DANIEL WALDEN MD BRIANA PETITO, NP    TEL:  FROM 9 AM to 5 PM ---OFFICE: [phone]  FROM 5 PM - 9 AM PLEASE CALL ANSWERING SERVICE: [phone]      RENAL PROGRESS NOTE: DATE OF SERVICE 07-19-23 @ 15:29  Patient is a 53y old  Male who presents with a chief complaint of Possible DDRT     Patient seen and examined at bedside. No chest pain/sob    VITALS:  T(F): 98 (07-19-23 @ 14:30), Max: 98.5 (07-19-23 @ 05:00)  HR: 80 (07-19-23 @ 14:30)  BP: 152/70 (07-19-23 @ 14:30)  RR: 20 (07-19-23 @ 14:30)  SpO2: 98% (07-19-23 @ 14:30)  Wt(kg): --    07-18 @ 07:01  -  07-19 @ 07:00  --------------------------------------------------------  IN: 1360 mL / OUT: 240 mL / NET: 1120 mL    07-19 @ 07:01  -  07-19 @ 15:29  --------------------------------------------------------  IN: 0 mL / OUT: 25 mL / NET: -25 mL        PHYSICAL EXAM:  Constitutional: NAD  Neck: No JVD  Respiratory: CTAB, no wheezes, rales or rhonchi  Cardiovascular: S1, S2, RRR  Gastrointestinal: BS+, soft, NT/ND. LIANA drain. surgical incision c/d/i   Extremities: No peripheral edema  Access: AVF    Hospital Medications:   MEDICATIONS  (STANDING):  aspirin  chewable 81 milliGRAM(s) Oral daily  atorvastatin 40 milliGRAM(s) Oral at bedtime  carvedilol 6.25 milliGRAM(s) Oral every 12 hours  chlorhexidine 4% Liquid 1 Application(s) Topical <User Schedule>  epoetin arian (EPOGEN) Injectable 63228 Unit(s) SubCutaneous once  famotidine    Tablet 20 milliGRAM(s) Oral daily  insulin glargine Injectable (LANTUS) 15 Unit(s) SubCutaneous at bedtime  insulin lispro (ADMELOG) corrective regimen sliding scale   SubCutaneous Before meals and at bedtime  insulin lispro Injectable (ADMELOG) 5 Unit(s) SubCutaneous three times a day before meals  mycophenolate mofetil 1 Gram(s) Oral <User Schedule>  nystatin    Suspension 425744 Unit(s) Swish and Swallow four times a day  polyethylene glycol 3350 17 Gram(s) Oral two times a day  senna 2 Tablet(s) Oral at bedtime  sevelamer carbonate 1600 milliGRAM(s) Oral three times a day with meals  trimethoprim   80 mG/sulfamethoxazole 400 mG 1 Tablet(s) Oral daily  valGANciclovir 450 milliGRAM(s) Oral <User Schedule>    Tacrolimus (), Serum: 14.9 ng/mL (07-19 @ 07:06)    LABS:  07-19    138  |  95<L>  |  66<H>  ----------------------------<  196<H>  5.8<H>   |  26  |  7.32<H>    Ca    10.1      19 Jul 2023 07:03  Phos  8.7     07-19  Mg     2.6     07-19      Creatinine Trend: 7.32 <--, 6.80 <--, 6.45 <--, 5.92 <--, 5.64 <--, 5.33 <--, 4.87 <--, 8.23 <--, 8.18 <--, 7.87 <--, 7.92 <--, 8.05 <--, 7.73 <--, 6.97 <--, 7.03 <--, 6.55 <--, 7.14 <--    Phosphorus: 8.7 mg/dL (07-19 @ 07:03)  Phosphorus: 8.2 mg/dL (07-19 @ 00:09)  Phosphorus: 8.2 mg/dL (07-18 @ 18:35)                              8.8    13.22 )-----------( 182       19 Jul 2023 07:06 )             27.4     Urine Studies:  Urinalysis - [07-19-23 @ 07:03]      Color  / Appearance  / SG  / pH       Gluc 196 / Ketone   / Bili  / Urobili        Blood  / Protein  / Leuk Est  / Nitrite       RBC  / WBC  / Hyaline  / Gran  / Sq Epi  / Non Sq Epi  / Bacteria       TSH 5.16      [07-22-22 @ 04:43]    HBsAb 5.7      [07-15-23 @ 21:22]  HBsAg Nonreact      [07-15-23 @ 21:21]  HBcAb Nonreact      [07-15-23 @ 21:22]  HCV 0.16, Nonreact      [07-15-23 @ 21:22]  HIV Nonreact      [07-15-23 @ 21:21]      RADIOLOGY & ADDITIONAL STUDIES:   [facility] [place] [facility]ACT[facility]   MD DANIEL WALDEN MD BRIANA PETITO, NP    TEL:  FROM 9 AM to 5 PM ---OFFICE: [phone]  FROM 5 PM - 9 AM PLEASE CALL ANSWERING SERVICE: [phone]      RENAL PROGRESS NOTE: DATE OF SERVICE 07-19-23 @ 15:29  Patient is a 53y old  Male who presents with a chief complaint of Possible DDRT     Patient seen and examined at bedside. No chest pain/sob    VITALS:  T(F): 98 (07-19-23 @ 14:30), Max: 98.5 (07-19-23 @ 05:00)  HR: 80 (07-19-23 @ 14:30)  BP: 152/70 (07-19-23 @ 14:30)  RR: 20 (07-19-23 @ 14:30)  SpO2: 98% (07-19-23 @ 14:30)  Wt(kg): --    07-18 @ 07:01  -  07-19 @ 07:00  --------------------------------------------------------  IN: 1360 mL / OUT: 240 mL / NET: 1120 mL    07-19 @ 07:01  -  07-19 @ 15:29  --------------------------------------------------------  IN: 0 mL / OUT: 25 mL / NET: -25 mL        PHYSICAL EXAM:  Constitutional: NAD  Neck: No JVD  Respiratory: CTAB, no wheezes, rales or rhonchi  Cardiovascular: S1, S2, RRR  Gastrointestinal: BS+, soft, NT/ND. LIANA drain. surgical incision c/d/i   Genitourinary: Samuels   Extremities: No peripheral edema  Access: [facility] Medications:   MEDICATIONS  (STANDING):  aspirin  chewable 81 milliGRAM(s) Oral daily  atorvastatin 40 milliGRAM(s) Oral at bedtime  carvedilol 6.25 milliGRAM(s) Oral every 12 hours  chlorhexidine 4% Liquid 1 Application(s) Topical <User Schedule>  epoetin arian (EPOGEN) Injectable 57231 Unit(s) SubCutaneous once  famotidine    Tablet 20 milliGRAM(s) Oral daily  insulin glargine Injectable (LANTUS) 15 Unit(s) SubCutaneous at bedtime  insulin lispro (ADMELOG) corrective regimen sliding scale   SubCutaneous Before meals and at bedtime  insulin lispro Injectable (ADMELOG) 5 Unit(s) SubCutaneous three times a day before meals  mycophenolate mofetil 1 Gram(s) Oral <User Schedule>  nystatin    Suspension 498767 Unit(s) Swish and Swallow four times a day  polyethylene glycol 3350 17 Gram(s) Oral two times a day  senna 2 Tablet(s) Oral at bedtime  sevelamer carbonate 1600 milliGRAM(s) Oral three times a day with meals  trimethoprim   80 mG/sulfamethoxazole 400 mG 1 Tablet(s) Oral daily  valGANciclovir 450 milliGRAM(s) Oral <User Schedule>    Tacrolimus (), Serum: 14.9 ng/mL (07-19 @ 07:06)    LABS:  07-19    138  |  95<L>  |  66<H>  ----------------------------<  196<H>  5.8<H>   |  26  |  7.32<H>    Ca    10.1      19 Jul 2023 07:03  Phos  8.7     07-19  Mg     2.6     07-19      Creatinine Trend: 7.32 <--, 6.80 <--, 6.45 <--, 5.92 <--, 5.64 <--, 5.33 <--, 4.87 <--, 8.23 <--, 8.18 <--, 7.87 <--, 7.92 <--, 8.05 <--, 7.73 <--, 6.97 <--, 7.03 <--, 6.55 <--, 7.14 <--    Phosphorus: 8.7 mg/dL (07-19 @ 07:03)  Phosphorus: 8.2 mg/dL (07-19 @ 00:09)  Phosphorus: 8.2 mg/dL (07-18 @ 18:35)                              8.8    13.22 )-----------( 182      ( 19 Jul 2023 07:06 )             27.4     Urine Studies:  Urinalysis - [07-19-23 @ 07:03]      Color  / Appearance  / SG  / pH       Gluc 196 / Ketone   / Bili  / Urobili        Blood  / Protein  / Leuk Est  / Nitrite       RBC  / WBC  / Hyaline  / Gran  / Sq Epi  / Non Sq Epi  / Bacteria       TSH 5.16      [07-22-22 @ 04:43]    HBsAb 5.7      [07-15-23 @ 21:22]  HBsAg Nonreact      [07-15-23 @ 21:21]  HBcAb Nonreact      [07-15-23 @ 21:22]  HCV 0.16, Nonreact      [07-15-23 @ 21:22]  HIV Nonreact      [07-15-23 @ 21:21]      RADIOLOGY & ADDITIONAL STUDIES:   no

## 2023-07-19 NOTE — PROGRESS NOTE ADULT - PROBLEM SELECTOR PLAN 1
Pt. with h/o ESRD on HD. Underwent DDRT on 7/16/23. Post-transplant course significant for DGF, pt. received first HD treatment on 7/17. Pt. is oliguric, documented urine output is 80 cc over the past 24 hours. Pt. clinically stable. Labs reviewed. Plan for HD treatment today with 2.5L UF. Continue with immunosuppression, as outlined below. Monitor labs and urine output. Avoid nephrotoxins. Dose medications as per eGFR. Pt. with h/o ESRD on HD. Underwent DDRT on 7/16/23. Post-transplant course significant for DGF, pt. received first HD treatment on 7/17. Pt. is oliguric, documented urine output is 80 cc over the past 24 hours. Pt. clinically stable. Labs reviewed. Plan for HD treatment today with 2.5L UF. Vascular evaluation of AVF. Continue with immunosuppression, as outlined below. Monitor labs and urine output. Avoid nephrotoxins. Dose medications as per eGFR.

## 2023-07-19 NOTE — PROGRESS NOTE ADULT - SUBJECTIVE AND OBJECTIVE BOX
DATE OF SERVICE: 07-19-23 @ 16:25  CHIEF COMPLAINT:Patient is a 53y old  Male who presents with a chief complaint of Possible DDRT (19 Jul 2023 15:29)    	        PAST MEDICAL & SURGICAL HISTORY:  Diabetes mellitus  type 2      Foot ulcer due to secondary DM      Coronary artery disease      Acute on chronic systolic heart failure      Breast Reduction  at age 18      Toe amputation status, left      S/P CABG (coronary artery bypass graft)      AICD (automatic cardioverter/defibrillator) present              NECK: No pain or stiffness  RESPIRATORY: No cough, wheezing, chills or hemoptysis; No Shortness of Breath  CARDIOVASCULAR: No chest pain, palpitations, passing out, dizziness,  GASTROINTESTINAL: No abdominal or epigastric pain. No nausea, vomiting, or hematemesis;    NEUROLOGICAL: No headaches,    Medications:  MEDICATIONS  (STANDING):  aspirin  chewable 81 milliGRAM(s) Oral daily  atorvastatin 40 milliGRAM(s) Oral at bedtime  carvedilol 6.25 milliGRAM(s) Oral every 12 hours  chlorhexidine 4% Liquid 1 Application(s) Topical <User Schedule>  epoetin arian (EPOGEN) Injectable 56459 Unit(s) SubCutaneous once  famotidine    Tablet 20 milliGRAM(s) Oral daily  insulin glargine Injectable (LANTUS) 15 Unit(s) SubCutaneous at bedtime  insulin lispro (ADMELOG) corrective regimen sliding scale   SubCutaneous Before meals and at bedtime  insulin lispro Injectable (ADMELOG) 5 Unit(s) SubCutaneous three times a day before meals  mycophenolate mofetil 1 Gram(s) Oral <User Schedule>  nystatin    Suspension 045545 Unit(s) Swish and Swallow four times a day  polyethylene glycol 3350 17 Gram(s) Oral two times a day  senna 2 Tablet(s) Oral at bedtime  sevelamer carbonate 1600 milliGRAM(s) Oral three times a day with meals  trimethoprim   80 mG/sulfamethoxazole 400 mG 1 Tablet(s) Oral daily  valGANciclovir 450 milliGRAM(s) Oral <User Schedule>    MEDICATIONS  (PRN):  HYDROmorphone  Injectable 0.5 milliGRAM(s) IV Push every 3 hours PRN breakthrough pain  sodium chloride 0.9% lock flush 10 milliLiter(s) IV Push every 1 hour PRN Pre/post blood products, medications, blood draw, and to maintain line patency  traMADol 25 milliGRAM(s) Oral every 6 hours PRN Moderate Pain (4 - 6)  traMADol 50 milliGRAM(s) Oral every 6 hours PRN Severe Pain (7 - 10)    	    PHYSICAL EXAM:  T(C): 36.7 (07-19-23 @ 14:30), Max: 36.9 (07-19-23 @ 05:00)  HR: 80 (07-19-23 @ 14:30) (80 - 99)  BP: 152/70 (07-19-23 @ 14:30) (128/60 - 186/78)  RR: 20 (07-19-23 @ 14:30) (16 - 36)  SpO2: 98% (07-19-23 @ 14:30) (96% - 100%)  Wt(kg): --  I&O's Summary    18 Jul 2023 07:01  -  19 Jul 2023 07:00  --------------------------------------------------------  IN: 1360 mL / OUT: 240 mL / NET: 1120 mL    19 Jul 2023 07:01  -  19 Jul 2023 16:25  --------------------------------------------------------  IN: 0 mL / OUT: 25 mL / NET: -25 mL        Appearance: Normal	  HEENT:   Normal oral mucosa, PERRL, EOMI	  Lymphatic: No lymphadenopathy  Cardiovascular: Normal S1 S2, No JVD,  Respiratory: Lungs clear to auscultation	  Psychiatry: A & O x 3, Mood & affect appropriate  Gastrointestinal:  Soft, Non-tender, + BS	  	  Neurologic: Non-focal  Extremities: Normal range of motion,     TELEMETRY: 	    ECG:  	  RADIOLOGY:  OTHER: 	  	  LABS:	 	    CARDIAC MARKERS:                                8.8    13.22 )-----------( 182      ( 19 Jul 2023 07:06 )             27.4     07-19    138  |  95<L>  |  66<H>  ----------------------------<  196<H>  5.8<H>   |  26  |  7.32<H>    Ca    10.1      19 Jul 2023 07:03  Phos  8.7     07-19  Mg     2.6     07-19      proBNP:   Lipid Profile:   HgA1c:   TSH:

## 2023-07-19 NOTE — CONSULT NOTE ADULT - SUBJECTIVE AND OBJECTIVE BOX
Maryjane Arguello  : 1977  Primary: Elissa Norris  Secondary:  [address] @ [address] [place] [id number]  Phone: [phone]  Fax: [phone] Plan Frequency: 2x a week  Plan of Care/Certification Expiration Date: 23    PT Visit Info:  1        OUTPATIENT PHYSICAL THERAPY:OP NOTE TYPE: Treatment Note 2022       Episode  }Appt Desk              ICD-10: Treatment Diagnosis: Pain in Left Shoulder (M25.512)  Strain of muscle(s) and tendon(s) of the rotator cuff of Left Shoulder, sequela (S46.012S)   Medical/Referring Diagnosis:  No admission diagnoses are documented for this encounter. Referring Physician:  Gerardo Rosado MD MD Orders:  PT Eval and Treat   Date of Onset:  Onset Date: 22   Allergies:   Patient has no known allergies. Restrictions/Precautions:  Restrictions/Precautions: None  No data recorded   Interventions Planned (Treatment may consist of any combination of the following):    Current Treatment Recommendations: Strengthening; ROM; Functional mobility training; Endurance training; Neuromuscular re-education; Manual Therapy - Soft Tissue Mobilization; Return to work related activity; Home exercise program; Safety education & training; Modalities; Therapeutic activities     Subjective Comments:   See Eval  Initial:}    2/10Post Session:       2/10  Medications Last Reviewed:  2022  Updated Objective Findings:  See evaluation note from today  Treatment       THERAPEUTIC EXERCISE: (23 minutes):  Exercises per grid below to improve mobility, strength and balance. Required minimal visual, verbal and manual cues to promote proper body alignment and promote proper body posture. Progressed resistance and complexity of movement as indicated.      Date:  2022 Date:   Date:     Activity/Exercise Parameters Parameters Parameters   Education HEP, POC, PT goals, anatomy/pathology     Manual PROM 8min     UBE      Rings      PCS [id number]     IR stretch [id number] cane     Wand FE ER 3min     SR 15x     Bilateral ER 15x orange                                               THERAPEUTIC ACTIVITY: ( 0 minutes): Activities per gid below to improve functional movement related mobility, strength and balance to improve neuro-muscular carryover to daily functional activities for improving patient's quality of life. Required visual, verbal and manual cues to promote proper body alignment and promote proper body posture/mechanics. Progressed resistance and complexity of movement as indicated. Date:  11/8/2022 Date:   Date:     Activity/Exercise Parameters Parameters Parameters                                                   MANUAL THERAPY: (0 minutes): Joint mobilization, Soft tissue mobilization was utilized and necessary because of the patient's restricted joint motion and restricted motion of soft tissue mobility. Date  11/8/2022    Technique Used Grade  Level # Time(s) Effect while being performed                                                                 HEP Log Date    11/8/2022   2.     3.    4.    5.              Treatment/Session Summary:    Treatment Assessment:   See Eval  Communication/Consultation:   Instruction in correct posture  Equipment provided today:  None  Recommendations/Intent for next treatment session: Next visit will focus on progression of ROM and strength.     Total Treatment Billable Duration:  23 minutes   Time In: 6002  Time Out: [address] [place] [address], PT       Charge Capture  }Post Session Pain  Med[facility] Portal  MD Guidelines  Scanned Media  Benefits  MyChart    Future Appointments   Date Time Provider Keshia Lutz   11/15/2022  2:45 PM Dalia Slater, PT [facility]   11/22/2022  2:45 PM Dalia Slater, PT [facility]   11/29/2022  3:15 PM Dalia Slater PT [facility]   12/6/2022  2:45 PM Dalia Slater, PT [facility]   12/12/2022  8:30 AM Navya Kessler MD [facility]   12/13/2022 2:45 PM Lois Holloway, PT [facility]   1/16/2023  2:20 PM SAMSON Craig - [facility] VASCULAR SURGERY CONSULT NOTE    Patient is a 53y old  Male who presents with a chief complaint of Possible DDRT (2023 16:25)      HPI:  52 yo M with hx of DM2 CAD, CHF, s/p CABG , AICD (), on hemodialysis for approximately 6 years via L AVF (nephrologist Dr. Bharath Romo), He has hx of PVD, is s/p 4 stents in R leg (mid and distal right superficial femoral artery, right popliteal x2 on 22).; is sp RT big toe and second toe amputation , on asa and plavix for severe PVD (per pt).     Patient now s/p renal transplant on . Patient reports LUE swelling since . Recent duplex showed LUE subclavian thombosis in the setting of this new LUE swelling. Patient with distal LUE fistula. Vascular Surgery consulted for management of subclavian thrombosis.         PAST MEDICAL & SURGICAL HISTORY:  Diabetes mellitus type 2    Foot ulcer due to secondary DM    Coronary artery disease    Acute on chronic systolic heart failure    Breast Reduction at age 18    Toe amputation status, left    S/P CABG (coronary artery bypass graft)    AICD (automatic cardioverter/defibrillator) present      [  ] No significant past history as reviewed with the patient and family    FAMILY HISTORY:  Family history of diabetes mellitus (Father)      [  ] Family history not pertinent as reviewed with the patient and family    SOCIAL HISTORY:    MEDICATIONS  (STANDING):  aspirin  chewable 81 milliGRAM(s) Oral daily  atorvastatin 40 milliGRAM(s) Oral at bedtime  carvedilol 6.25 milliGRAM(s) Oral every 12 hours  chlorhexidine 4% Liquid 1 Application(s) Topical <User Schedule>  epoetin arian (EPOGEN) Injectable 02496 Unit(s) SubCutaneous once  famotidine    Tablet 20 milliGRAM(s) Oral daily  insulin glargine Injectable (LANTUS) 15 Unit(s) SubCutaneous at bedtime  insulin lispro (ADMELOG) corrective regimen sliding scale   SubCutaneous Before meals and at bedtime  insulin lispro Injectable (ADMELOG) 5 Unit(s) SubCutaneous three times a day before meals  mycophenolate mofetil 1 Gram(s) Oral <User Schedule>  nystatin    Suspension 626951 Unit(s) Swish and Swallow four times a day  polyethylene glycol 3350 17 Gram(s) Oral two times a day  senna 2 Tablet(s) Oral at bedtime  sevelamer carbonate 1600 milliGRAM(s) Oral three times a day with meals  trimethoprim   80 mG/sulfamethoxazole 400 mG 1 Tablet(s) Oral daily  valGANciclovir 450 milliGRAM(s) Oral <User Schedule>    MEDICATIONS  (PRN):  HYDROmorphone  Injectable 0.5 milliGRAM(s) IV Push every 3 hours PRN breakthrough pain  sodium chloride 0.9% lock flush 10 milliLiter(s) IV Push every 1 hour PRN Pre/post blood products, medications, blood draw, and to maintain line patency  traMADol 25 milliGRAM(s) Oral every 6 hours PRN Moderate Pain (4 - 6)  traMADol 50 milliGRAM(s) Oral every 6 hours PRN Severe Pain (7 - 10)    Allergies    Contrast. (Unknown)    Intolerances        Vital Signs Last 24 Hrs  T(C): 37.2 (2023 17:00), Max: 37.2 (2023 17:00)  T(F): 98.9 (2023 17:00), Max: 98.9 (2023 17:00)  HR: 86 (2023 17:00) (80 - 103)  BP: 141/64 (2023 17:00) (120/56 - 186/78)  BP(mean): 92 (2023 17:00) (81 - 112)  RR: 18 (2023 17:00) (16 - 36)  SpO2: 98% (2023 17:00) (96% - 100%)    Parameters below as of 2023 17:00  Patient On (Oxygen Delivery Method): room air      Physical Exam:  General: In NAD, laying in bed  Resp: Bilateral chest rise on RA  LUE:   Swelling up to level of axilla. Palpable thrill at fistula. Radial/brachial pulses palpable.     Daily     Daily Weight in k.1 (2023 05:00)      Labs:                        8.8    13.22 )-----------( 182      ( 2023 07:06 )             27.4     07-19    138  |  95<L>  |  66<H>  ----------------------------<  196<H>  5.8<H>   |  26  |  7.32<H>    Ca    10.1      2023 07:03  Phos  8.7     07-  Mg     2.6     07-      PT/INR - ( 2023 07:06 )   PT: 12.7 sec;   INR: 1.09 ratio         PTT - ( 2023 07:06 )  PTT:27.2 sec  Urinalysis Basic - ( 2023 07:03 )    Color: x / Appearance: x / SG: x / pH: x  Gluc: 196 mg/dL / Ketone: x  / Bili: x / Urobili: x   Blood: x / Protein: x / Nitrite: x   Leuk Esterase: x / RBC: x / WBC x   Sq Epi: x / Non Sq Epi: x / Bacteria: x        IMAGING STUDIES:    < from: [facility] Duplex Hemodialysis Access, Left (23 @ 23:12) >      INTERPRETATION:  History: End-stage kidney disease, left upper extremity   swelling, evaluate dialysis access left upper extremity.    Edema is present in the superficial soft tissues of the left forearm.    A dialysis access fistula was created by the surgical anastomosis of the   cephalic vein to the radial artery in the distal left forearm.    Calcified atheromatous plaque is present along the course of the left   radial artery.    There is a brisk low resistance pattern of antegrade flow through the   left radial artery proximal to the surgical anastomosis.    There is aneurysmal formation and arterialization of the egress vein.    Elevated velocities measuring 433/146 cm/s in the egress cephalic vein   suggest a mild flow-limiting stenosis at this location.    There is redirection of egress flow from the cephalic vein to the basilic   vein in the left upper arm.    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 This is associated with chest   wall varices.    The flow volume in the egress cephalic vein measured 1100 mL/m.    Impression:    The thrombosis of the left subclavian vein is likely responsible for the   swelling of the left upper extremity.    --- End of Report ---      < end of copied text >           VASCULAR SURGERY CONSULT NOTE    Patient is a 53y old  Male who presents with a chief complaint of Possible DDRT (2023 16:25)      HPI:  52 yo M with hx of DM2 CAD, CHF, s/p CABG , AICD (2016), on hemodialysis for approximately 6 years via L AVF (nephrologist Dr. Bharath Romo), He has hx of PVD, is s/p 4 stents in R leg (mid and distal right superficial femoral artery, right popliteal x2 on 22).; is sp RT big toe and second toe amputation , on asa and plavix for severe PVD (per pt).     Patient now s/p renal transplant on . Patient reports LUE swelling since . Recent duplex showed LUE subclavian thombosis in the setting of this new LUE swelling. Patient with distal LUE fistula. Patient with history of swelling of LUE and previous fistulagram/stenting. Vascular Surgery consulted for management of subclavian thrombosis.         PAST MEDICAL & SURGICAL HISTORY:  Diabetes mellitus type 2    Foot ulcer due to secondary DM    Coronary artery disease    Acute on chronic systolic heart failure    Breast Reduction at age 18    Toe amputation status, left    S/P CABG (coronary artery bypass graft)    AICD (automatic cardioverter/defibrillator) present      [  ] No significant past history as reviewed with the patient and family    FAMILY HISTORY:  Family history of diabetes mellitus (Father)      [  ] Family history not pertinent as reviewed with the patient and family    SOCIAL HISTORY:    MEDICATIONS  (STANDING):  aspirin  chewable 81 milliGRAM(s) Oral daily  atorvastatin 40 milliGRAM(s) Oral at bedtime  carvedilol 6.25 milliGRAM(s) Oral every 12 hours  chlorhexidine 4% Liquid 1 Application(s) Topical <User Schedule>  epoetin arian (EPOGEN) Injectable 35861 Unit(s) SubCutaneous once  famotidine    Tablet 20 milliGRAM(s) Oral daily  insulin glargine Injectable (LANTUS) 15 Unit(s) SubCutaneous at bedtime  insulin lispro (ADMELOG) corrective regimen sliding scale   SubCutaneous Before meals and at bedtime  insulin lispro Injectable (ADMELOG) 5 Unit(s) SubCutaneous three times a day before meals  mycophenolate mofetil 1 Gram(s) Oral <User Schedule>  nystatin    Suspension 633014 Unit(s) Swish and Swallow four times a day  polyethylene glycol 3350 17 Gram(s) Oral two times a day  senna 2 Tablet(s) Oral at bedtime  sevelamer carbonate 1600 milliGRAM(s) Oral three times a day with meals  trimethoprim   80 mG/sulfamethoxazole 400 mG 1 Tablet(s) Oral daily  valGANciclovir 450 milliGRAM(s) Oral <User Schedule>    MEDICATIONS  (PRN):  HYDROmorphone  Injectable 0.5 milliGRAM(s) IV Push every 3 hours PRN breakthrough pain  sodium chloride 0.9% lock flush 10 milliLiter(s) IV Push every 1 hour PRN Pre/post blood products, medications, blood draw, and to maintain line patency  traMADol 25 milliGRAM(s) Oral every 6 hours PRN Moderate Pain (4 - 6)  traMADol 50 milliGRAM(s) Oral every 6 hours PRN Severe Pain (7 - 10)    Allergies    Contrast. (Unknown)    Intolerances        Vital Signs Last 24 Hrs  T(C): 37.2 (2023 17:00), Max: 37.2 (2023 17:00)  T(F): 98.9 (2023 17:00), Max: 98.9 (2023 17:00)  HR: 86 (2023 17:00) (80 - 103)  BP: 141/64 (2023 17:00) (120/56 - 186/78)  BP(mean): 92 (2023 17:00) (81 - 112)  RR: 18 (2023 17:00) (16 - 36)  SpO2: 98% (2023 17:00) (96% - 100%)    Parameters below as of 2023 17:00  Patient On (Oxygen Delivery Method): room air      Physical Exam:  General: In NAD, laying in bed  Resp: Bilateral chest rise on RA  LUE:   Swelling up to level of axilla. Palpable thrill at fistula. Radial/brachial pulses palpable.     Daily     Daily Weight in k.1 (2023 05:00)      Labs:                        8.8    13.22 )-----------( 182      ( 2023 07:06 )             27.4     07-19    138  |  95<L>  |  66<H>  ----------------------------<  196<H>  5.8<H>   |  26  |  7.32<H>    Ca    10.1      2023 07:03  Phos  8.7     07-  Mg     2.6     07-19      PT/INR - ( 2023 07:06 )   PT: 12.7 sec;   INR: 1.09 ratio         PTT - ( 2023 07:06 )  PTT:27.2 sec  Urinalysis Basic - ( 2023 07:03 )    Color: x / Appearance: x / SG: x / pH: x  Gluc: 196 mg/dL / Ketone: x  / Bili: x / Urobili: x   Blood: x / Protein: x / Nitrite: x   Leuk Esterase: x / RBC: x / WBC x   Sq Epi: x / Non Sq Epi: x / Bacteria: x        IMAGING STUDIES:    < from: [facility] Duplex Hemodialysis Access, Left (23 @ 23:12) >      INTERPRETATION:  History: End-stage kidney disease, left upper extremity   swelling, evaluate dialysis access left upper extremity.    Edema is present in the superficial soft tissues of the left forearm.    A dialysis access fistula was created by the surgical anastomosis of the   cephalic vein to the radial artery in the distal left forearm.    Calcified atheromatous plaque is present along the course of the left   radial artery.    There is a brisk low resistance pattern of antegrade flow through the   left radial artery proximal to the surgical anastomosis.    There is aneurysmal formation and arterialization of the egress vein.    Elevated velocities measuring 433/146 cm/s in the egress cephalic vein   suggest a mild flow-limiting stenosis at this location.    There is redirection of egress flow from the cephalic vein to the basilic   vein in the left upper arm.    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 This is associated with chest   wall varices.    The flow volume in the egress cephalic vein measured 1100 mL/m.    Impression:    The thrombosis of the left subclavian vein is likely responsible for the   swelling of the left upper extremity.    --- End of Report ---      < end of copied text >

## 2023-07-19 NOTE — PROGRESS NOTE ADULT - SUBJECTIVE AND OBJECTIVE BOX
[facility] DIVISION OF KIDNEY DISEASES AND HYPERTENSION -- FOLLOW UP NOTE  --------------------------------------------------------------------------------    HPI: 52 yo M with h/o ESRD on HD x6 years via LUE AVF (outpatient nephrologist: Dr. Bharath Romo), DM2 CAD, CHF, underwent CABG 2015, AICD (2016), and severe PVD (underwent R big toe and second toe amputation in 2021) initially presented for kidney transplant. Pt. underwent DDRT on 7/16. Pt. was transferred to SICU for IV vasopressor support, now off and transferred to medical floor. Post-transplant course significant for DGF. Pt. received first HD treatment on 7/17.     24 hour events/subjective: Pt. was seen and evaluated at bedside this morning. Pt. reports significant BL UE and LE edema. Continues to have LUE swelling around AVF site. Denies any headaches, fevers/chills, chest pain, abdominal pain, and SOB.    PAST HISTORY  --------------------------------------------------------------------------------  No significant changes to PMH, PSH, FHx, SHx, unless otherwise noted    ALLERGIES & MEDICATIONS  --------------------------------------------------------------------------------  Allergies    Contrast. (Unknown)    Intolerances      Standing Inpatient Medications  acetaminophen     Tablet .. 650 milliGRAM(s) Oral once  antithymocyte globulin rabbit (peripheral) IVPB w/additives 150 milliGRAM(s) IV Intermittent once  aspirin  chewable 81 milliGRAM(s) Oral daily  atorvastatin 40 milliGRAM(s) Oral at bedtime  carvedilol 6.25 milliGRAM(s) Oral every 12 hours  chlorhexidine 4% Liquid 1 Application(s) Topical <User Schedule>  diphenhydrAMINE 50 milliGRAM(s) Oral once  epoetin arian (EPOGEN) Injectable 86162 Unit(s) SubCutaneous once  famotidine    Tablet 20 milliGRAM(s) Oral daily  insulin glargine Injectable (LANTUS) 12 Unit(s) SubCutaneous at bedtime  insulin lispro (ADMELOG) corrective regimen sliding scale   SubCutaneous Before meals and at bedtime  methylPREDNISolone sodium succinate Injectable 125 milliGRAM(s) IV Push once  mycophenolate mofetil 1 Gram(s) Oral <User Schedule>  nystatin    Suspension 922171 Unit(s) Swish and Swallow four times a day  polyethylene glycol 3350 17 Gram(s) Oral two times a day  senna 2 Tablet(s) Oral at bedtime  sevelamer carbonate 1600 milliGRAM(s) Oral three times a day with meals  trimethoprim   80 mG/sulfamethoxazole 400 mG 1 Tablet(s) Oral daily  valGANciclovir 450 milliGRAM(s) Oral <User Schedule>    PRN Inpatient Medications  HYDROmorphone  Injectable 0.5 milliGRAM(s) IV Push every 3 hours PRN  sodium chloride 0.9% lock flush 10 milliLiter(s) IV Push every 1 hour PRN  traMADol 25 milliGRAM(s) Oral every 6 hours PRN  traMADol 50 milliGRAM(s) Oral every 6 hours PRN      REVIEW OF SYSTEMS  --------------------------------------------------------------------------------  See HPI    VITALS/PHYSICAL EXAM  --------------------------------------------------------------------------------  T(C): 36.4 (07-19-23 @ 09:45), Max: 36.9 (07-19-23 @ 05:00)  HR: 87 (07-19-23 @ 09:45) (76 - 99)  BP: 143/79 (07-19-23 @ 09:45) (128/60 - 186/78)  RR: 20 (07-19-23 @ 09:45) (13 - 43)  SpO2: 97% (07-19-23 @ 09:45) (96% - 100%)  Wt(kg): --    07-18-23 @ 07:01  -  07-19-23 @ 07:00  --------------------------------------------------------  IN: 1360 mL / OUT: 240 mL / NET: 1120 mL    Physical Exam:  Gen: NAD  HEENT: PERRL, supple neck, clear oropharynx  Pulm: CTA B/L  CV: RRR, S1S2;  Abd: +BS, soft, nontender/nondistended  : No suprapubic tenderness  Transplant: Mild incisional tenderness. Surgical site is c/d/i  Extremities: +BL UE and LE edema  Skin: Warm, without rashes  Vascular access: +LUE AVF with thrill and bruit appreciated, +swelling around AVF site    LABS/STUDIES  --------------------------------------------------------------------------------              8.8    13.22 >-----------<  182      [07-19-23 @ 07:06]              27.4     138  |  95  |  66  ----------------------------<  196      [07-19-23 @ 07:03]  5.8   |  26  |  7.32        Ca     10.1     [07-19-23 @ 07:03]      Mg     2.6     [07-19-23 @ 07:03]      Phos  8.7     [07-19-23 @ 07:03]      PT/INR: PT 12.7 , INR 1.09       [07-19-23 @ 07:06]  PTT: 27.2       [07-19-23 @ 07:06]      Creatinine Trend:  SCr 7.32 [07-19 @ 07:03]  SCr 6.80 [07-19 @ 00:09]  SCr 6.45 [07-18 @ 18:35]  SCr 5.92 [07-18 @ 12:10]  SCr 5.64 [07-18 @ 05:48]    Tacrolimus (), Serum: 14.9 ng/mL (07-19 @ 07:06)  Tacrolimus (), Serum: 13.6 ng/mL (07-18 @ 05:48)  Tacrolimus (), Serum: 5.4 ng/mL (07-17 @ 05:11)    TSH 5.16      [07-22-22 @ 04:43]    HBsAb 5.7      [07-15-23 @ 21:22]  HBsAg Nonreact      [07-15-23 @ 21:21]  HBcAb Nonreact      [07-15-23 @ 21:22]  HCV 0.16, Nonreact      [07-15-23 @ 21:22]  HIV Nonreact      [07-15-23 @ 21:21]

## 2023-07-20 LAB
ANION GAP SERPL CALC-SCNC: 15 MMOL/L — SIGNIFICANT CHANGE UP (ref 5–17)
APTT BLD: 25 SEC — LOW (ref 27.5–35.5)
APTT BLD: 25.5 SEC — LOW (ref 27.5–35.5)
BLD GP AB SCN SERPL QL: NEGATIVE — SIGNIFICANT CHANGE UP
BUN SERPL-MCNC: 50 MG/DL — HIGH (ref 7–23)
CALCIUM SERPL-MCNC: 9.3 MG/DL — SIGNIFICANT CHANGE UP (ref 8.4–10.5)
CHLORIDE SERPL-SCNC: 97 MMOL/L — SIGNIFICANT CHANGE UP (ref 96–108)
CO2 SERPL-SCNC: 25 MMOL/L — SIGNIFICANT CHANGE UP (ref 22–31)
CREAT SERPL-MCNC: 5.24 MG/DL — HIGH (ref 0.5–1.3)
EGFR: 12 ML/MIN/1.73M2 — LOW
GLUCOSE BLDC GLUCOMTR-MCNC: 189 MG/DL — HIGH (ref 70–99)
GLUCOSE BLDC GLUCOMTR-MCNC: 201 MG/DL — HIGH (ref 70–99)
GLUCOSE BLDC GLUCOMTR-MCNC: 245 MG/DL — HIGH (ref 70–99)
GLUCOSE BLDC GLUCOMTR-MCNC: 288 MG/DL — HIGH (ref 70–99)
GLUCOSE SERPL-MCNC: 206 MG/DL — HIGH (ref 70–99)
HCT VFR BLD CALC: 26.1 % — LOW (ref 39–50)
HCT VFR BLD CALC: 32.1 % — LOW (ref 39–50)
HGB BLD-MCNC: 10.4 G/DL — LOW (ref 13–17)
HGB BLD-MCNC: 8.4 G/DL — LOW (ref 13–17)
MAGNESIUM SERPL-MCNC: 2.4 MG/DL — SIGNIFICANT CHANGE UP (ref 1.6–2.6)
MCHC RBC-ENTMCNC: 30.4 PG — SIGNIFICANT CHANGE UP (ref 27–34)
MCHC RBC-ENTMCNC: 30.9 PG — SIGNIFICANT CHANGE UP (ref 27–34)
MCHC RBC-ENTMCNC: 32.2 GM/DL — SIGNIFICANT CHANGE UP (ref 32–36)
MCHC RBC-ENTMCNC: 32.4 GM/DL — SIGNIFICANT CHANGE UP (ref 32–36)
MCV RBC AUTO: 94.6 FL — SIGNIFICANT CHANGE UP (ref 80–100)
MCV RBC AUTO: 95.3 FL — SIGNIFICANT CHANGE UP (ref 80–100)
NRBC # BLD: 0 /100 WBCS — SIGNIFICANT CHANGE UP (ref 0–0)
NRBC # BLD: 0 /100 WBCS — SIGNIFICANT CHANGE UP (ref 0–0)
PHOSPHATE SERPL-MCNC: 7.3 MG/DL — HIGH (ref 2.5–4.5)
PLATELET # BLD AUTO: 111 K/UL — LOW (ref 150–400)
PLATELET # BLD AUTO: 88 K/UL — LOW (ref 150–400)
POTASSIUM SERPL-MCNC: 5.2 MMOL/L — SIGNIFICANT CHANGE UP (ref 3.5–5.3)
POTASSIUM SERPL-SCNC: 5.2 MMOL/L — SIGNIFICANT CHANGE UP (ref 3.5–5.3)
RBC # BLD: 2.76 M/UL — LOW (ref 4.2–5.8)
RBC # BLD: 3.37 M/UL — LOW (ref 4.2–5.8)
RBC # FLD: 17.3 % — HIGH (ref 10.3–14.5)
RBC # FLD: 17.9 % — HIGH (ref 10.3–14.5)
RH IG SCN BLD-IMP: POSITIVE — SIGNIFICANT CHANGE UP
SODIUM SERPL-SCNC: 137 MMOL/L — SIGNIFICANT CHANGE UP (ref 135–145)
TACROLIMUS SERPL-MCNC: 9.1 NG/ML — SIGNIFICANT CHANGE UP
WBC # BLD: 6.34 K/UL — SIGNIFICANT CHANGE UP (ref 3.8–10.5)
WBC # BLD: 7.9 K/UL — SIGNIFICANT CHANGE UP (ref 3.8–10.5)
WBC # FLD AUTO: 6.34 K/UL — SIGNIFICANT CHANGE UP (ref 3.8–10.5)
WBC # FLD AUTO: 7.9 K/UL — SIGNIFICANT CHANGE UP (ref 3.8–10.5)

## 2023-07-20 PROCEDURE — 99233 SBSQ HOSP IP/OBS HIGH 50: CPT | Mod: GC

## 2023-07-20 PROCEDURE — 99232 SBSQ HOSP IP/OBS MODERATE 35: CPT | Mod: GC,24

## 2023-07-20 RX ORDER — ACETAMINOPHEN 500 MG
650 TABLET ORAL ONCE
Refills: 0 | Status: COMPLETED | OUTPATIENT
Start: 2023-07-20 | End: 2023-07-20

## 2023-07-20 RX ORDER — TACROLIMUS 5 MG/1
6 CAPSULE ORAL DAILY
Refills: 0 | Status: DISCONTINUED | OUTPATIENT
Start: 2023-07-20 | End: 2023-07-20

## 2023-07-20 RX ORDER — TACROLIMUS 5 MG/1
6 CAPSULE ORAL
Refills: 0 | Status: DISCONTINUED | OUTPATIENT
Start: 2023-07-20 | End: 2023-07-22

## 2023-07-20 RX ORDER — HEPARIN SODIUM 5000 [USP'U]/ML
14 INJECTION INTRAVENOUS; SUBCUTANEOUS
Qty: 25000 | Refills: 0 | Status: DISCONTINUED | OUTPATIENT
Start: 2023-07-20 | End: 2023-07-20

## 2023-07-20 RX ORDER — CARVEDILOL PHOSPHATE 80 MG/1
3.12 CAPSULE, EXTENDED RELEASE ORAL EVERY 12 HOURS
Refills: 0 | Status: DISCONTINUED | OUTPATIENT
Start: 2023-07-20 | End: 2023-07-20

## 2023-07-20 RX ORDER — TACROLIMUS 5 MG/1
6 CAPSULE ORAL ONCE
Refills: 0 | Status: COMPLETED | OUTPATIENT
Start: 2023-07-20 | End: 2023-07-20

## 2023-07-20 RX ORDER — DIPHENHYDRAMINE HCL 50 MG
50 CAPSULE ORAL ONCE
Refills: 0 | Status: COMPLETED | OUTPATIENT
Start: 2023-07-20 | End: 2023-07-20

## 2023-07-20 RX ORDER — HEPARIN SODIUM 5000 [USP'U]/ML
1600 INJECTION INTRAVENOUS; SUBCUTANEOUS
Qty: 25000 | Refills: 0 | Status: DISCONTINUED | OUTPATIENT
Start: 2023-07-20 | End: 2023-07-21

## 2023-07-20 RX ADMIN — Medication 4: at 08:39

## 2023-07-20 RX ADMIN — FAMOTIDINE 20 MILLIGRAM(S): 10 INJECTION INTRAVENOUS at 11:44

## 2023-07-20 RX ADMIN — Medication 5 UNIT(S): at 08:39

## 2023-07-20 RX ADMIN — HEPARIN SODIUM 16 UNIT(S)/HR: 5000 INJECTION INTRAVENOUS; SUBCUTANEOUS at 23:31

## 2023-07-20 RX ADMIN — MYCOPHENOLATE MOFETIL 1 GRAM(S): 250 CAPSULE ORAL at 08:40

## 2023-07-20 RX ADMIN — Medication 500000 UNIT(S): at 18:43

## 2023-07-20 RX ADMIN — Medication 6: at 22:17

## 2023-07-20 RX ADMIN — Medication 5 UNIT(S): at 18:42

## 2023-07-20 RX ADMIN — Medication 500000 UNIT(S): at 05:57

## 2023-07-20 RX ADMIN — CARVEDILOL PHOSPHATE 6.25 MILLIGRAM(S): 80 CAPSULE, EXTENDED RELEASE ORAL at 05:56

## 2023-07-20 RX ADMIN — Medication 650 MILLIGRAM(S): at 11:44

## 2023-07-20 RX ADMIN — INSULIN GLARGINE 15 UNIT(S): 100 INJECTION, SOLUTION SUBCUTANEOUS at 22:16

## 2023-07-20 RX ADMIN — Medication 81 MILLIGRAM(S): at 11:44

## 2023-07-20 RX ADMIN — SEVELAMER CARBONATE 1600 MILLIGRAM(S): 2400 POWDER, FOR SUSPENSION ORAL at 08:40

## 2023-07-20 RX ADMIN — Medication 50 MILLIGRAM(S): at 11:44

## 2023-07-20 RX ADMIN — MYCOPHENOLATE MOFETIL 1 GRAM(S): 250 CAPSULE ORAL at 20:57

## 2023-07-20 RX ADMIN — Medication 500000 UNIT(S): at 11:45

## 2023-07-20 RX ADMIN — Medication 500000 UNIT(S): at 22:16

## 2023-07-20 RX ADMIN — POLYETHYLENE GLYCOL 3350 17 GRAM(S): 17 POWDER, FOR SOLUTION ORAL at 05:57

## 2023-07-20 RX ADMIN — ATORVASTATIN CALCIUM 40 MILLIGRAM(S): 80 TABLET, FILM COATED ORAL at 22:16

## 2023-07-20 RX ADMIN — Medication 5 UNIT(S): at 12:22

## 2023-07-20 RX ADMIN — HEPARIN SODIUM 0.14 UNIT(S)/HR: 5000 INJECTION INTRAVENOUS; SUBCUTANEOUS at 12:09

## 2023-07-20 RX ADMIN — TACROLIMUS 6 MILLIGRAM(S): 5 CAPSULE ORAL at 12:22

## 2023-07-20 RX ADMIN — SEVELAMER CARBONATE 1600 MILLIGRAM(S): 2400 POWDER, FOR SUSPENSION ORAL at 18:42

## 2023-07-20 RX ADMIN — Medication 1 TABLET(S): at 11:45

## 2023-07-20 RX ADMIN — Medication 60 MILLIGRAM(S): at 11:45

## 2023-07-20 RX ADMIN — SEVELAMER CARBONATE 1600 MILLIGRAM(S): 2400 POWDER, FOR SUSPENSION ORAL at 11:44

## 2023-07-20 RX ADMIN — Medication 2: at 12:22

## 2023-07-20 RX ADMIN — Medication 4: at 18:42

## 2023-07-20 NOTE — PROGRESS NOTE ADULT - PROBLEM SELECTOR PLAN 3
Pt. with hyperkalemia in the setting of ESRD and recent DDRT with DGF. Serum potassium improved to 5.1 today following medical management and HD treatment. Monitor serum potassium.

## 2023-07-20 NOTE — PROGRESS NOTE ADULT - SUBJECTIVE AND OBJECTIVE BOX
[facility] DIVISION OF KIDNEY DISEASES AND HYPERTENSION -- FOLLOW UP NOTE  --------------------------------------------------------------------------------    HPI: 54 yo M with h/o ESRD on HD x6 years via LUE AVF (outpatient nephrologist: Dr. Bharath Romo), DM2 CAD, CHF, underwent CABG 2015, AICD (2016), and severe PVD (underwent R big toe and second toe amputation in 2021) initially presented for kidney transplant. Pt. underwent DDRT on 7/16. Pt. was transferred to SICU for IV vasopressor support, now off and transferred to medical floor. Post-transplant course significant for DGF. Pt. received first HD treatment on 7/17, last on 7/19.    24 hour events/subjective: Pt. was seen and evaluated at bedside this morning. Pt. reports improvement in BL UE and LE edema following HD treatment yesterday. LUE swelling around AVF site is improving. Feels well overall. Denies any headaches, fevers/chills, chest pain, abdominal pain, and SOB.    PAST HISTORY  --------------------------------------------------------------------------------  No significant changes to PMH, PSH, FHx, SHx, unless otherwise noted    ALLERGIES & MEDICATIONS  --------------------------------------------------------------------------------  Allergies    Contrast. (Unknown)    Intolerances      Standing Inpatient Medications  acetaminophen     Tablet .. 650 milliGRAM(s) Oral once  antithymocyte globulin rabbit (peripheral) IVPB w/additives 150 milliGRAM(s) IV Intermittent once  aspirin  chewable 81 milliGRAM(s) Oral daily  atorvastatin 40 milliGRAM(s) Oral at bedtime  carvedilol 3.125 milliGRAM(s) Oral every 12 hours  chlorhexidine 4% Liquid 1 Application(s) Topical <User Schedule>  diphenhydrAMINE 50 milliGRAM(s) Oral once  famotidine    Tablet 20 milliGRAM(s) Oral daily  heparin  Infusion 14 Unit(s)/Hr IV Continuous <Continuous>  insulin glargine Injectable (LANTUS) 15 Unit(s) SubCutaneous at bedtime  insulin lispro (ADMELOG) corrective regimen sliding scale   SubCutaneous Before meals and at bedtime  insulin lispro Injectable (ADMELOG) 5 Unit(s) SubCutaneous three times a day before meals  methylPREDNISolone sodium succinate Injectable 60 milliGRAM(s) IV Push once  mycophenolate mofetil 1 Gram(s) Oral <User Schedule>  nystatin    Suspension 845766 Unit(s) Swish and Swallow four times a day  polyethylene glycol 3350 17 Gram(s) Oral two times a day  senna 2 Tablet(s) Oral at bedtime  sevelamer carbonate 1600 milliGRAM(s) Oral three times a day with meals  trimethoprim   80 mG/sulfamethoxazole 400 mG 1 Tablet(s) Oral daily  valGANciclovir 450 milliGRAM(s) Oral <User Schedule>    PRN Inpatient Medications  HYDROmorphone  Injectable 0.5 milliGRAM(s) IV Push every 3 hours PRN  sodium chloride 0.9% lock flush 10 milliLiter(s) IV Push every 1 hour PRN  traMADol 25 milliGRAM(s) Oral every 6 hours PRN  traMADol 50 milliGRAM(s) Oral every 6 hours PRN    REVIEW OF SYSTEMS  --------------------------------------------------------------------------------  See HPI    VITALS/PHYSICAL EXAM  --------------------------------------------------------------------------------  T(C): 36.8 (07-20-23 @ 09:09), Max: 37.2 (07-19-23 @ 17:00)  HR: 86 (07-20-23 @ 09:09) (80 - 103)  BP: 140/72 (07-20-23 @ 09:09) (110/54 - 154/65)  RR: 18 (07-20-23 @ 09:09) (18 - 20)  SpO2: 98% (07-20-23 @ 09:09) (97% - 100%)  Wt(kg): --    07-19-23 @ 07:01  -  07-20-23 @ 07:00  --------------------------------------------------------  IN: 680 mL / OUT: 2540 mL / NET: -1860 mL    07-20-23 @ 07:01  -  07-20-23 @ 11:45  --------------------------------------------------------  IN: 240 mL / OUT: 45 mL / NET: 195 mL    Physical Exam:  Gen: NAD  HEENT: PERRL, supple neck, clear oropharynx  Pulm: CTA B/L  CV: RRR, S1S2;  Abd: +BS, soft, nontender/nondistended  : No suprapubic tenderness  Transplant: Surgical site is c/d/i  Extremities: +BL UE and LE edema (improving)  Skin: Warm, without rashes  Vascular access: +LUE AVF with thrill and bruit appreciated, +swelling around AVF site (improving)    LABS/STUDIES  --------------------------------------------------------------------------------              8.4    7.90  >-----------<  111      [07-20-23 @ 06:35]              26.1     137  |  97  |  50  ----------------------------<  206      [07-20-23 @ 06:35]  5.2   |  25  |  5.24        Ca     9.3     [07-20-23 @ 06:35]      Mg     2.4     [07-20-23 @ 06:35]      Phos  7.3     [07-20-23 @ 06:35]    PT/INR: PT 12.7 , INR 1.09       [07-19-23 @ 07:06]  PTT: 27.2       [07-19-23 @ 07:06]    Creatinine Trend:  SCr 5.24 [07-20 @ 06:35]  SCr 7.32 [07-19 @ 07:03]  SCr 6.80 [07-19 @ 00:09]  SCr 6.45 [07-18 @ 18:35]  SCr 5.92 [07-18 @ 12:10]    Tacrolimus (), Serum: 9.1 ng/mL (07-20 @ 06:35)  Tacrolimus (), Serum: 14.9 ng/mL (07-19 @ 07:06)  Tacrolimus (), Serum: 13.6 ng/mL (07-18 @ 05:48)  Tacrolimus (), Serum: 5.4 ng/mL (07-17 @ 05:11)    TSH 5.16      [07-22-22 @ 04:43]    HBsAb 5.7      [07-15-23 @ 21:22]  HBsAg Nonreact      [07-15-23 @ 21:21]  HBcAb Nonreact      [07-15-23 @ 21:22]  HCV 0.16, Nonreact      [07-15-23 @ 21:22]  HIV Nonreact      [07-15-23 @ 21:21]

## 2023-07-20 NOTE — PROGRESS NOTE ADULT - ASSESSMENT
Patient is a 53y Male  with hx of DM2 CAD, CHF, s/p CABG 2015, AICD (2016), on hemodialysis for approximately 6 years via L AVF (nephrologist Dr. Bharath Romo), He has hx of PVD, is s/p 4 stents in R leg (mid and distal right superficial femoral artery, right popliteal x2 on 1/14/22).  s/p  RT big toe and second toe amputation 2021, on asa and plavix for severe PVD (per pt).   Patient is s/p DDRT 7/16, requiring levophed drip and insulin drip post op with hyperkalemia, transferred to SICU         CVS: Post op hypotension  improved  on regular floor    - diet as tolerated  - Bowel regimen     Renal: s/p DDRT for ESRD 2/2 DM  -has left arm fistula,   hyperkalemia  hd as per renal  - care per renal transplant regarding: tc / meds  - Monitor I&Os and electrolytes  - replete electrolytes as needed   s/p hd on 7/17 / 19 /for hd today       Heme: stable h/h  - Monitor CBC and coags  -  VTE prophylaxis: hold  -SCD's        Endo: hx of DM,   - Hyperglycemia requring insulin drip d/c d    now fsg riss    steroid taper     -oob

## 2023-07-20 NOTE — PROGRESS NOTE ADULT - PROBLEM SELECTOR PLAN 4
Pt. with anemia in the setting of ESRD/DGF. Hgb is low at 8.4 today. Recommend EPO qweekly. Monitor hgb.    If you have any questions, please feel free to contact me  Enrique Nagel  Nephrology Fellow  [phone] / [employer] Teams(Preferred)  (After 5pm or on weekends please page the on-call fellow)

## 2023-07-20 NOTE — CHART NOTE - NSCHARTNOTEFT_GEN_A_CORE
Nutrition Follow Up Note  Patient seen for: Nutrition Department protocol  for post kidney transplant recipient follow up   Pt is s/p DDRT 23 POD#4    Chart reviewed, events noted. "54 yo M with hx of DM2 CAD, CHF, s/p CABG , AICD (), on hemodialysis for approximately 6 years via L AVF (nephrologist Dr. Bharath Romo), He has hx of PVD, is s/p 4 stents in R leg (mid and distal right superficial femoral artery, right popliteal x2 on 22).; is sp RT big toe and second toe amputation , on asa and plavix for severe PVD (per pt).   Now here for DDRT. Pt reports dose not make urine only few drops occasionally. Pt denies N/V/D, fevers/chills, CP, SOB. Pt reports last ate at 4pm today and had HD yesterday 23.  Underwent L DDRT with CIT 16hrs, 1A,1V,1U. Started on insulin drip intraoperatively for hyperkalemia after reperfusion. He was started on pressors (Levo - 0.04) intraoperatively and received 2U PRBC. Got Simulect and solumedrol for induction. "    Source: [X] Patient       [x] Current Medical Record        [] RN        [] Family at bedside       [] Other:    -If unable to interview patient: [] Trach/Vent/BiPAP  [] Disoriented/confused/inappropriate to interview    Diet Order:   Diet, Renal Restrictions:   For patients receiving Renal Replacement - No Protein Restr, No Conc K, No Conc Phos, Low Sodium  Consistent Carbohydrate {No Snacks} (CSTCHO) (23)    - Is current order appropriate/adequate? [X] Yes  []  No:     - PO intake :   [] >75%  Adequate    [X] 50-75%  Fair       [] <50%  Poor    - Nutrition-related concerns:      - Ordered for transplant meds: Thymoglobulin,  Solu-medrol, Cellcept       -  BG Management:  Lantus 15 U at bedtime, Lispro 5 u 3x/day before meals, and SSI      - S/P iHD treatments on  & ; Hyperphosphatemia noted ordered for Renvela 3x/day with meals        - Pt provided with post-transplant education on initial assessment, able to teach back 1-2 points at time of RD visit     GI:  Last BM 23  Bowel Regimen? [X] Yes- Miralax and Senna   [] No    UOP: 15 ml thus far (), 150 ml (), 80 m ()  - Low urine out put noted, likely related to delayed graft function (DGF), will monitor urine output during present admission.     Weights: Daily Weight in k.5 (), Weight in k.4 (), Weight in k.7 (), Weight in k.1 (), Weight in k.6 (), Weight in k.7 ()  -- Weight fluctuations in setting of fluid shifts (IVF, intraoperative fluids, hemodialysis treatments, and edema). Will continue to monitor weight trends as available/able.     Nutritionally Pertinent MEDICATIONS  (STANDING):  atorvastatin  carvedilol  famotidine    Tablet  insulin glargine Injectable (LANTUS)  insulin lispro (ADMELOG) corrective regimen sliding scale  insulin lispro Injectable (ADMELOG)  methylPREDNISolone sodium succinate Injectable  nystatin    Suspension  polyethylene glycol 3350  senna  trimethoprim   80 mG/sulfamethoxazole 400 mG  valGANciclovir    Pertinent Labs:  @ 06:35: Na 137, BUN 50<H>, Cr 5.24<H>, <H>, K+ 5.2, Phos 7.3<H>, Mg 2.4, Alk Phos --, ALT/SGPT --, AST/SGOT --, HbA1c --      Finger Sticks:  POCT Blood Glucose.: 245 mg/dL ( @ 07:46)  POCT Blood Glucose.: 132 mg/dL ( @ 22:26)  POCT Blood Glucose.: 213 mg/dL ( @ 18:13)  POCT Blood Glucose.: 259 mg/dL ( @ 12:56)      Skin per nursing documentation:  -- No pressure injuries noted per flow sheets   Edema: -- +1 generalized edema, +2 edema to left wrist, arm, and hand     Estimated Needs:   [X] no change since previous assessment  [] recalculated:     Previous Nutrition Diagnosis:   1. Increased Nutrient Needs  2. Food and Nutrition related knowledge deficit   Nutrition Diagnosis is: [x] ongoing  [] resolved [] not applicable     New Nutrition Diagnosis: [X] Not applicable    Nutrition Care Plan:  [X] In Progress  [] Achieved  [] Not applicable    Nutrition Interventions:     Education Provided:       [X] Yes: Reviewed post-transplant nutrition therapy and food safety guidelines for transplant recipients.  Reviewed recommendations to avoid grapefruit, pomegranate and star fruit while taking immunosuppressant medication.  Reviewed recommendations for moderate intake of sodium with transplant medications.  Reviewed Consistent Carbohydrate diet, including carbohydrate content of foods and portion sizes. Pt is advised that BG management may be more challenging after transplant. [] No:        Recommendations:         1. Continue diet as ordered/tolerated upon discharge: Consistent Carbohydrate Renal      2. Recommend follow up visit with Transplant MD & outpatient RD as warranted      3. Provide encouragement with PO intakes, menu selections and assistance with meals as needed      4. Provided education on post-transplant nutrition therapy & food safety guidelines for transplant recipients with nutrition package before discharge- made aware RD remains available      Monitoring and Evaluation:   Continue to monitor patient's weights, labs, PO intakes, urine output, blood glucose levels, and bowel movements.     RD remains available upon request and will follow up per protocol  Kasey Gibbons MS,RDN, CDN, Pager # [phone] or TEAMS

## 2023-07-20 NOTE — PROGRESS NOTE ADULT - SUBJECTIVE AND OBJECTIVE BOX
Subjective: Patient seen and examined. No new events except as noted.     SUBJECTIVE/ROS:  on a/c for subclavian thrombosis       MEDICATIONS:  MEDICATIONS  (STANDING):  acetaminophen     Tablet .. 650 milliGRAM(s) Oral once  antithymocyte globulin rabbit (peripheral) IVPB w/additives 150 milliGRAM(s) IV Intermittent once  aspirin  chewable 81 milliGRAM(s) Oral daily  atorvastatin 40 milliGRAM(s) Oral at bedtime  carvedilol 3.125 milliGRAM(s) Oral every 12 hours  chlorhexidine 4% Liquid 1 Application(s) Topical <User Schedule>  diphenhydrAMINE 50 milliGRAM(s) Oral once  famotidine    Tablet 20 milliGRAM(s) Oral daily  heparin  Infusion 14 Unit(s)/Hr (0.14 mL/Hr) IV Continuous <Continuous>  insulin glargine Injectable (LANTUS) 15 Unit(s) SubCutaneous at bedtime  insulin lispro (ADMELOG) corrective regimen sliding scale   SubCutaneous Before meals and at bedtime  insulin lispro Injectable (ADMELOG) 5 Unit(s) SubCutaneous three times a day before meals  methylPREDNISolone sodium succinate Injectable 60 milliGRAM(s) IV Push once  mycophenolate mofetil 1 Gram(s) Oral <User Schedule>  nystatin    Suspension 780248 Unit(s) Swish and Swallow four times a day  polyethylene glycol 3350 17 Gram(s) Oral two times a day  senna 2 Tablet(s) Oral at bedtime  sevelamer carbonate 1600 milliGRAM(s) Oral three times a day with meals  trimethoprim   80 mG/sulfamethoxazole 400 mG 1 Tablet(s) Oral daily  valGANciclovir 450 milliGRAM(s) Oral <User Schedule>      PHYSICAL EXAM:  T(C): 36.8 (07-20-23 @ 09:09), Max: 37.2 (07-19-23 @ 17:00)  HR: 86 (07-20-23 @ 09:09) (80 - 103)  BP: 140/72 (07-20-23 @ 09:09) (110/54 - 154/65)  RR: 18 (07-20-23 @ 09:09) (18 - 20)  SpO2: 98% (07-20-23 @ 09:09) (97% - 100%)  Wt(kg): --  I&O's Summary    19 Jul 2023 07:01  -  20 Jul 2023 07:00  --------------------------------------------------------  IN: 680 mL / OUT: 2540 mL / NET: -1860 mL    20 Jul 2023 07:01  -  20 Jul 2023 11:03  --------------------------------------------------------  IN: 240 mL / OUT: 45 mL / NET: 195 mL            JVP: Normal  Neck: supple  Lung: clear   CV: S1 S2 , Murmur:  Abd: soft  Ext: No edema  neuro: Awake / alert  Psych: flat affect  Skin: normal``    LABS/DATA:    CARDIAC MARKERS:                                8.4    7.90  )-----------( 111      ( 20 Jul 2023 06:35 )             26.1     07-20    137  |  97  |  50<H>  ----------------------------<  206<H>  5.2   |  25  |  5.24<H>    Ca    9.3      20 Jul 2023 06:35  Phos  7.3     07-20  Mg     2.4     07-20      proBNP:   Lipid Profile:   HgA1c:   TSH:     TELE:  EKG:

## 2023-07-20 NOTE — PROGRESS NOTE ADULT - SUBJECTIVE AND OBJECTIVE BOX
Transplant Surgery - Multidisciplinary Progress Note  --------------------------------------------------------------  DDRT 7/16/23 POD#3    Pt seen and examined by the multidisciplinary team: Surgery: Dr. Muñoz   and Nephrology: Dr. Aguilar and  SHERIE Chris/Vinod/Julissa unit RN. Rest of disciplines not in attendance to be notified of plan    54 yo M with hx of DM2 CAD, CHF, s/p CABG 2015, AICD (2016), on hemodialysis for approximately 6 years via L AVF (nephrologist Dr. Bharath Romo), He has hx of PVD, is s/p 4 stents in R leg (mid and distal right superficial femoral artery, right popliteal x2 on 1/14/22).; is sp RT big toe and second toe amputation 2021, on asa and plavix for severe PVD (per pt).   Now here for DDRT. Pt reports dose not make urine only few drops occasionally. Pt denies N/V/D, fevers/chills, CP, SOB. Pt reports last ate at 4pm today and had HD yesterday 7/14/23.    Underwent L DDRT with [facility] 16hrs, 1A,1V,1U. Started on insulin drip intraoperatively for hyperkalemia after reperfusion. He was started on pressors (Levo - 0.04) intraoperatively and received 2U PRBC. Got Simulect and solumedrol for induction.     Interval events:   - LUE swelling. Vascular evaluation recommending AC, discussed this morning, will obtain CTV and eval possible intervrention,.   - Pt. is oliguric, documented urine output is 150 cc over the past 24 hours.  - Granda to be removed this am  - HD yesterday, 2.3L removed with hypotension at the end  - Coreg decreased to 3.125  - VSS stable, Afebrile.   - LIANA output remains serosanguinous  - Continues on steroid taper without complaints.      MEDICATIONS  (STANDING):  acetaminophen     Tablet .. 650 milliGRAM(s) Oral once  antithymocyte globulin rabbit (peripheral) IVPB w/additives 150 milliGRAM(s) IV Intermittent once  aspirin  chewable 81 milliGRAM(s) Oral daily  atorvastatin 40 milliGRAM(s) Oral at bedtime  carvedilol 6.25 milliGRAM(s) Oral every 12 hours  chlorhexidine 4% Liquid 1 Application(s) Topical <User Schedule>  diphenhydrAMINE 50 milliGRAM(s) Oral once  epoetin arian (EPOGEN) Injectable 81969 Unit(s) SubCutaneous once  famotidine    Tablet 20 milliGRAM(s) Oral daily  insulin glargine Injectable (LANTUS) 15 Unit(s) SubCutaneous at bedtime  insulin lispro (ADMELOG) corrective regimen sliding scale   SubCutaneous Before meals and at bedtime  insulin lispro Injectable (ADMELOG) 5 Unit(s) SubCutaneous three times a day before meals  methylPREDNISolone sodium succinate Injectable 125 milliGRAM(s) IV Push once  mycophenolate mofetil 1 Gram(s) Oral <User Schedule>  nystatin    Suspension 430811 Unit(s) Swish and Swallow four times a day  polyethylene glycol 3350 17 Gram(s) Oral two times a day  senna 2 Tablet(s) Oral at bedtime  sevelamer carbonate 1600 milliGRAM(s) Oral three times a day with meals  trimethoprim   80 mG/sulfamethoxazole 400 mG 1 Tablet(s) Oral daily  valGANciclovir 450 milliGRAM(s) Oral <User Schedule>    MEDICATIONS  (PRN):  HYDROmorphone  Injectable 0.5 milliGRAM(s) IV Push every 3 hours PRN breakthrough pain  sodium chloride 0.9% lock flush 10 milliLiter(s) IV Push every 1 hour PRN Pre/post blood products, medications, blood draw, and to maintain line patency  traMADol 25 milliGRAM(s) Oral every 6 hours PRN Moderate Pain (4 - 6)  traMADol 50 milliGRAM(s) Oral every 6 hours PRN Severe Pain (7 - 10)    PAST MEDICAL & SURGICAL HISTORY:  Diabetes mellitus  type 2  Foot ulcer due to secondary DM  Coronary artery disease  Acute on chronic systolic heart failure  Breast Reduction  at age 18  Toe amputation status, left  S/P CABG (coronary artery bypass graft)  AICD (automatic cardioverter/defibrillator) present    Vital Signs Last 24 Hrs  T(C): 36.4 (19 Jul 2023 09:45), Max: 36.9 (19 Jul 2023 05:00)  T(F): 97.6 (19 Jul 2023 09:45), Max: 98.5 (19 Jul 2023 05:00)  HR: 87 (19 Jul 2023 09:45) (76 - 99)  BP: 143/79 (19 Jul 2023 09:45) (128/60 - 186/78)  BP(mean): 100 (19 Jul 2023 05:00) (82 - 112)  RR: 20 (19 Jul 2023 09:45) (13 - 43)  SpO2: 97% (19 Jul 2023 09:45) (96% - 100%)  Parameters below as of 19 Jul 2023 09:45  Patient On (Oxygen Delivery Method): room air    I&O's Summary  18 Jul 2023 07:01  -  19 Jul 2023 07:00  --------------------------------------------------------  IN: 1360 mL / OUT: 240 mL / NET: 1120 mL    19 Jul 2023 07:01  -  19 Jul 2023 13:29  --------------------------------------------------------  IN: 0 mL / OUT: 5 mL / NET: -5 mL                        8.8    13.22 )-----------( 182      ( 19 Jul 2023 07:06 )             27.4     07-19    138  |  95<L>  |  66<H>  ----------------------------<  196<H>  5.8<H>   |  26  |  7.32<H>    Ca    10.1      19 Jul 2023 07:03  Phos  8.7     07-19  Mg     2.6     07-19    Tacrolimus (), Serum: 14.9 ng/mL (07-19 @ 07:06)    PHYSICAL EXAM:  GENERAL: NAD, well-groomed, well-developed  HEAD:  Atraumatic, Normocephalic  EYES: EOMI, PERRLA, conjunctiva and sclera clear  NECK: Supple, No JVD, L IJ TLC CDI  CHEST/LUNG: non-labored breathing on room air.   HEART: Regular rate and rhythm; S1/S2  ABDOMEN: Soft, Non-distended, LLQ incision with staples in place healing well. Miles drain with bulb suction SS output, no signs of infection.  : granda in place.   VASCULAR: Normal pulses, Normal capillary refill  EXTREMITIES:  2+ Peripheral Pulses, No cyanosis, L hand edema, LE edema improving b/l. AVF with + thrill.   SKIN: Warm, Intact

## 2023-07-20 NOTE — PROGRESS NOTE ADULT - ASSESSMENT
52 yo M with hx of DM2 CAD, CHF, s/p CABG 2015, AICD (2016), on hemodialysis for approximately 6 years via L AVF (nephrologist Dr. Bharath Romo), He has hx of PVD, is s/p 4 stents in R leg (mid and distal right superficial femoral artery, right popliteal x2 on 1/14/22).; is sp RT big toe and second toe amputation 2021, on asa and plavix for severe PVD (per pt).   Now here for DDRT. Pt reports dose not make urine only few drops occasionally. Pt denies N/V/D, fevers/chills, CP, SOB. Pt reports last ate at 4pm today and had HD yesterday 7/14/23.    Underwent L DDRT with [facility] 16hrs, 1A,1V,1U. Started on insulin drip intraoperatively for hyperkalemia after reperfusion. He was started on pressors (Levo - 0.004) intraoperatively and received 2U PRBC. Got Simulect and solumedrol for induction.     Plan:    POD#4- L DDRT  DGF---> Continues on HD for now for fluid removal. K 5.2 today and P 7.3  Repeat graft US with patent vessels  Monitor urine output, creatinine, LIANA.   Restarted on home aspirin. Holding plavix as the patient was started on heparin gtt.  Continue diabetic diet.    Immunosuppression:   - Switched from Simulect to Thymoglobulin.   - MMF, Methylpred taper, Env by level  - Valcyte to every other day (MWF)     DM:   - Monitor FS, patient now on ISS  - Started pre meal and Lantus.     PAD:   - Aspirin restarted  - Plavix held     LUE swelling.   - LUE US : prelim with SC stenosis vs thrombus (known per patient)   - Vascular evaluation of AVF recommending CTV and started on heparin gtt.   - No contraindication for CTV or fistulogram from transplant surgery perspective.   - Will follow result of CTV today and evaluate for further intervention by vascular surgery

## 2023-07-20 NOTE — PROGRESS NOTE ADULT - ASSESSMENT
54 yo M with hx of DM2 CAD, CHF, s/p CABG 2015, AICD (2016), on hemodialysis for approximately 6 years via L AVF (nephrologist Dr. Bharath Romo), He has hx of PVD, is s/p 4 stents in R leg (mid and distal right superficial femoral artery, right popliteal x2 on 1/14/22).; is sp RT big toe and second toe amputation 2021, on asa and plavix for severe PVD (per pt). Now here for possible DDRT. Pt reports does not make urine only few drops occasionally. Pt denies N/V/D, fevers/chills, CP, SOB. Pt reports last ate at 4pm today and had HD yesterday 7/14/23.  Nephrology consulted for ESRD s/p DDRT.      ESRD on HD [facility]  Nephrologist : Dr. Thomas / Dr. Brooke at [facility] [place]   s/p DDRT 7/16   s/p HD 7/19  HD again today per Transplant team   HD consent in chart.   Renal diet   Monitor     s/p DDRT [facility] 7/16/2023   Underwent L DDRT 7/13/2023 s/p Simulect and solumedrol for induction.   Started on insulin drip intraoperatively for hyperkalemia after reperfusion and transferred to SICU   Monitor BMP, urine output   Pt oliguric ~ 25 cc urine output over 24 hrs   s/p Lasix 80 mg IVP today per Transplant   s/p HD 7/19, plan for HD again today with 2L UF   Repeat graft US with patent vessels  Management per Transplant  LUE swelling US with left sub clavian vein stenosis and thrombosis. Vascular following & recommending CTV and started on heparin gtt.   Continue immunosuppressants per transplant : Envarsus by level (goal 8-10), Cellcept 1 gm BID, and prednisone taper.   Initially received Simulect induction - Changed to Thymoglobulin given delayed graft function. Dose #2 today   Valcyte to every other day (MWF) Nystatin, Bactrim renal dose  FK level 9.1 on 7/20   Check tacrolimus trough 30 minutes prior to AM dose  Monitor urine output, creatinine, LIANA.         Hyperkalemia   in setting of ESRD and recent DDRT with sub-optimally functioning allograft.   s/p HD 7/19  Low K diet   HD today   Mgmt per Transplant  Monitor     HTN:   BP fluctuates   Coreg reduced to 3.125mg po bid due to low BP.   Monitor     Hyperphosphatemia  Low PO4 diet  Phoslo switched to sevelamer 1600 mg tid   Monitor PO4 daily

## 2023-07-20 NOTE — PROGRESS NOTE ADULT - SUBJECTIVE AND OBJECTIVE BOX
DATE OF SERVICE: 07-20-23 @ 17:16  CHIEF COMPLAINT:Patient is a 53y old  Male who presents with a chief complaint of Possible DDRT (20 Jul 2023 14:22)    	        PAST MEDICAL & SURGICAL HISTORY:  Diabetes mellitus  type 2      Foot ulcer due to secondary DM      Coronary artery disease      Acute on chronic systolic heart failure      Breast Reduction  at age 18      Toe amputation status, left      S/P CABG (coronary artery bypass graft)      AICD (automatic cardioverter/defibrillator) present                  RESPIRATORY: No cough, wheezing, chills or hemoptysis; No Shortness of Breath  CARDIOVASCULAR: No chest pain, palpitations, passing out, dizziness,   GASTROINTESTINAL: No abdominal or epigastric pain. No nausea, vomiting, or hematemesis; No diarrhea or constipation. No melena or hematochezia.  swelling better  NEUROLOGICAL: No headaches,     Medications:  MEDICATIONS  (STANDING):  aspirin  chewable 81 milliGRAM(s) Oral daily  atorvastatin 40 milliGRAM(s) Oral at bedtime  carvedilol 3.125 milliGRAM(s) Oral every 12 hours  chlorhexidine 4% Liquid 1 Application(s) Topical <User Schedule>  famotidine    Tablet 20 milliGRAM(s) Oral daily  heparin  Infusion 14 Unit(s)/Hr (0.14 mL/Hr) IV Continuous <Continuous>  insulin glargine Injectable (LANTUS) 15 Unit(s) SubCutaneous at bedtime  insulin lispro (ADMELOG) corrective regimen sliding scale   SubCutaneous Before meals and at bedtime  insulin lispro Injectable (ADMELOG) 5 Unit(s) SubCutaneous three times a day before meals  mycophenolate mofetil 1 Gram(s) Oral <User Schedule>  nystatin    Suspension 514469 Unit(s) Swish and Swallow four times a day  polyethylene glycol 3350 17 Gram(s) Oral two times a day  senna 2 Tablet(s) Oral at bedtime  sevelamer carbonate 1600 milliGRAM(s) Oral three times a day with meals  tacrolimus ER Tablet (ENVARSUS XR) 6 milliGRAM(s) Oral <User Schedule>  trimethoprim   80 mG/sulfamethoxazole 400 mG 1 Tablet(s) Oral daily  valGANciclovir 450 milliGRAM(s) Oral <User Schedule>    MEDICATIONS  (PRN):  HYDROmorphone  Injectable 0.5 milliGRAM(s) IV Push every 3 hours PRN breakthrough pain  sodium chloride 0.9% lock flush 10 milliLiter(s) IV Push every 1 hour PRN Pre/post blood products, medications, blood draw, and to maintain line patency  traMADol 25 milliGRAM(s) Oral every 6 hours PRN Moderate Pain (4 - 6)  traMADol 50 milliGRAM(s) Oral every 6 hours PRN Severe Pain (7 - 10)    	    PHYSICAL EXAM:  T(C): 36.7 (07-20-23 @ 15:50), Max: 37.1 (07-20-23 @ 01:15)  HR: 86 (07-20-23 @ 15:50) (74 - 99)  BP: 117/83 (07-20-23 @ 15:50) (103/57 - 154/65)  RR: 16 (07-20-23 @ 15:50) (16 - 20)  SpO2: 99% (07-20-23 @ 15:50) (97% - 100%)  Wt(kg): --  I&O's Summary    19 Jul 2023 07:01  -  20 Jul 2023 07:00  --------------------------------------------------------  IN: 680 mL / OUT: 2540 mL / NET: -1860 mL    20 Jul 2023 07:01  -  20 Jul 2023 17:16  --------------------------------------------------------  IN: 813.9 mL / OUT: 95 mL / NET: 718.9 mL        Appearance: Normal	  HEENT:   Normal oral mucosa, PERRL, EOMI	  Lymphatic: No lymphadenopathy  Cardiovascular: Normal S1 S2, No JVD,  Respiratory: dec  bs   Gastrointestinal:  Soft, Non-tender, + BS	  Skin: No rashes, No ecchymoses, No cyanosis	  Neurologic: Non-focal  Extremities: edema better    TELEMETRY: 	    ECG:  	  RADIOLOGY:  OTHER: 	  	  LABS:	 	    CARDIAC MARKERS:                                8.4    7.90  )-----------( 111      ( 20 Jul 2023 06:35 )             26.1     07-20    137  |  97  |  50<H>  ----------------------------<  206<H>  5.2   |  25  |  5.24<H>    Ca    9.3      20 Jul 2023 06:35  Phos  7.3     07-20  Mg     2.4     07-20      proBNP:   Lipid Profile:   HgA1c:   TSH:

## 2023-07-20 NOTE — PROGRESS NOTE ADULT - ASSESSMENT
54 yo M with h/o ESRD on HD x6 years via LUE AVF (outpatient nephrologist: Dr. Bharath Romo), DM2 CAD, CHF, underwent CABG 2015, AICD (2016), and severe PVD (underwent R big toe and second toe amputation in 2021) initially presented for kidney transplant. Pt. underwent DDRT on 7/16. Pt. was transferred to SICU for IV vasopressor support, now off and transferred to medical floor. Post-transplant course significant for DGF. Pt. received first HD treatment on 7/17, last on 7/19.

## 2023-07-20 NOTE — PROGRESS NOTE ADULT - SUBJECTIVE AND OBJECTIVE BOX
[facility]   MD DANIEL WALDEN MD BRIANA PETITO, NP    TEL:  FROM 9 AM to 5 PM ---OFFICE: [phone]  FROM 5 PM - 9 AM PLEASE CALL ANSWERING SERVICE: [phone]     RENAL PROGRESS NOTE: DATE OF SERVICE 07-20-23 @ 14:23    Patient is a 53y old  Male who presents with a chief complaint of Possible DDRT    Patient seen and examined at bedside. No chest pain/sob    VITALS:  T(F): 97.9 (07-20-23 @ 13:50), Max: 98.9 (07-19-23 @ 17:00)  HR: 74 (07-20-23 @ 13:50)  BP: 135/71 (07-20-23 @ 13:50)  RR: 16 (07-20-23 @ 13:50)  SpO2: 98% (07-20-23 @ 13:50)  Wt(kg): --    07-19 @ 07:01  -  07-20 @ 07:00  --------------------------------------------------------  IN: 680 mL / OUT: 2540 mL / NET: -1860 mL    07-20 @ 07:01  -  07-20 @ 14:23  --------------------------------------------------------  IN: 647 mL / OUT: 45 mL / NET: 602 mL      PHYSICAL EXAM:  Constitutional: NAD  Neck: No JVD  Respiratory: CTAB, no wheezes, rales or rhonchi  Cardiovascular: S1, S2, RRR  Gastrointestinal: BS+, soft, NT/ND  Transplant: Surgical site is c/d/i. LIANA drain   Extremities: + b/l UE and LE edema (improving)  Skin: Warm, without rashes  Vascular access: +LUE AVF with thrill and bruit, + edema around AVF site (improving)    Hospital Medications:   MEDICATIONS  (STANDING):  aspirin  chewable 81 milliGRAM(s) Oral daily  atorvastatin 40 milliGRAM(s) Oral at bedtime  carvedilol 3.125 milliGRAM(s) Oral every 12 hours  chlorhexidine 4% Liquid 1 Application(s) Topical <User Schedule>  famotidine    Tablet 20 milliGRAM(s) Oral daily  heparin  Infusion 14 Unit(s)/Hr (0.14 mL/Hr) IV Continuous <Continuous>  insulin glargine Injectable (LANTUS) 15 Unit(s) SubCutaneous at bedtime  insulin lispro (ADMELOG) corrective regimen sliding scale   SubCutaneous Before meals and at bedtime  insulin lispro Injectable (ADMELOG) 5 Unit(s) SubCutaneous three times a day before meals  mycophenolate mofetil 1 Gram(s) Oral <User Schedule>  nystatin    Suspension 939841 Unit(s) Swish and Swallow four times a day  polyethylene glycol 3350 17 Gram(s) Oral two times a day  senna 2 Tablet(s) Oral at bedtime  sevelamer carbonate 1600 milliGRAM(s) Oral three times a day with meals  tacrolimus ER Tablet (ENVARSUS XR) 6 milliGRAM(s) Oral <User Schedule>  trimethoprim   80 mG/sulfamethoxazole 400 mG 1 Tablet(s) Oral daily  valGANciclovir 450 milliGRAM(s) Oral <User Schedule>    Tacrolimus (), Serum: 9.1 ng/mL (07-20 @ 06:35)    LABS:  07-20    137  |  97  |  50<H>  ----------------------------<  206<H>  5.2   |  25  |  5.24<H>    Ca    9.3      20 Jul 2023 06:35  Phos  7.3     07-20  Mg     2.4     07-20      Creatinine Trend: 5.24 <--, 7.32 <--, 6.80 <--, 6.45 <--, 5.92 <--, 5.64 <--, 5.33 <--, 4.87 <--, 8.23 <--, 8.18 <--, 7.87 <--, 7.92 <--, 8.05 <--, 7.73 <--, 6.97 <--, 7.03 <--, 6.55 <--, 7.14 <--    Phosphorus: 7.3 mg/dL (07-20 @ 06:35)                              8.4    7.90  )-----------( 111 ( 20 Jul 2023 06:35 )             26.1     Urine Studies:  Urinalysis - [07-20-23 @ 06:35]      Color  / Appearance  / SG  / pH       Gluc 206 / Ketone   / Bili  / Urobili        Blood  / Protein  / Leuk Est  / Nitrite       RBC  / WBC  / Hyaline  / Gran  / Sq Epi  / Non Sq Epi  / Bacteria       TSH 5.16      [07-22-22 @ 04:43]    HBsAb 5.7      [07-15-23 @ 21:22]  HBsAg Nonreact      [07-15-23 @ 21:21]  HBcAb Nonreact      [07-15-23 @ 21:22]  HCV 0.16, Nonreact      [07-15-23 @ 21:22]  HIV Nonreact      [07-15-23 @ 21:21]      RADIOLOGY & ADDITIONAL STUDIES:

## 2023-07-20 NOTE — PROGRESS NOTE ADULT - PROBLEM SELECTOR PLAN 1
Pt. with h/o ESRD on HD. Underwent DDRT on 7/16/23. Post-transplant course significant for DGF, pt. received first HD treatment on 7/17, last on 7/19. Pt. is oliguric, documented urine output is 90 cc over the past 24 hours. Pt. clinically stable. Labs reviewed. Plan for HD treatment today with 2L UF. Vascular consult note reviewed: recommending therapeutic AC and elevation. Continue with immunosuppression, as outlined below. Pt. will likely require HD following discharge. Monitor labs and urine output. Avoid nephrotoxins. Dose medications as per eGFR.

## 2023-07-20 NOTE — PROGRESS NOTE ADULT - ASSESSMENT
CAD s/p cabg  stable   asa, statin    chronic systolic chf  stable  improving LV function   cont coreg   off ace/arb/arni for now   s/p ICD  interrogation noted     ESRD  s/p transplant   fu with transplant team     subclavian thrombosis  on a/c

## 2023-07-21 LAB
ANION GAP SERPL CALC-SCNC: 15 MMOL/L — SIGNIFICANT CHANGE UP (ref 5–17)
APTT BLD: 21.9 SEC — LOW (ref 27.5–35.5)
APTT BLD: 32.7 SEC — SIGNIFICANT CHANGE UP (ref 27.5–35.5)
APTT BLD: 95.6 SEC — HIGH (ref 27.5–35.5)
BUN SERPL-MCNC: 52 MG/DL — HIGH (ref 7–23)
CALCIUM SERPL-MCNC: 9.4 MG/DL — SIGNIFICANT CHANGE UP (ref 8.4–10.5)
CHLORIDE SERPL-SCNC: 97 MMOL/L — SIGNIFICANT CHANGE UP (ref 96–108)
CO2 SERPL-SCNC: 27 MMOL/L — SIGNIFICANT CHANGE UP (ref 22–31)
CREAT SERPL-MCNC: 4.88 MG/DL — HIGH (ref 0.5–1.3)
EGFR: 14 ML/MIN/1.73M2 — LOW
GLUCOSE BLDC GLUCOMTR-MCNC: 188 MG/DL — HIGH (ref 70–99)
GLUCOSE BLDC GLUCOMTR-MCNC: 226 MG/DL — HIGH (ref 70–99)
GLUCOSE BLDC GLUCOMTR-MCNC: 229 MG/DL — HIGH (ref 70–99)
GLUCOSE BLDC GLUCOMTR-MCNC: 284 MG/DL — HIGH (ref 70–99)
GLUCOSE SERPL-MCNC: 290 MG/DL — HIGH (ref 70–99)
HCT VFR BLD CALC: 25.4 % — LOW (ref 39–50)
HCT VFR BLD CALC: 26.3 % — LOW (ref 39–50)
HCT VFR BLD CALC: 27.1 % — LOW (ref 39–50)
HGB BLD-MCNC: 8.2 G/DL — LOW (ref 13–17)
HGB BLD-MCNC: 8.4 G/DL — LOW (ref 13–17)
HGB BLD-MCNC: 8.6 G/DL — LOW (ref 13–17)
MAGNESIUM SERPL-MCNC: 2.4 MG/DL — SIGNIFICANT CHANGE UP (ref 1.6–2.6)
MCHC RBC-ENTMCNC: 29.8 PG — SIGNIFICANT CHANGE UP (ref 27–34)
MCHC RBC-ENTMCNC: 30.2 PG — SIGNIFICANT CHANGE UP (ref 27–34)
MCHC RBC-ENTMCNC: 30.4 PG — SIGNIFICANT CHANGE UP (ref 27–34)
MCHC RBC-ENTMCNC: 31.7 GM/DL — LOW (ref 32–36)
MCHC RBC-ENTMCNC: 31.9 GM/DL — LOW (ref 32–36)
MCHC RBC-ENTMCNC: 32.3 GM/DL — SIGNIFICANT CHANGE UP (ref 32–36)
MCV RBC AUTO: 93.8 FL — SIGNIFICANT CHANGE UP (ref 80–100)
MCV RBC AUTO: 94.1 FL — SIGNIFICANT CHANGE UP (ref 80–100)
MCV RBC AUTO: 94.6 FL — SIGNIFICANT CHANGE UP (ref 80–100)
NRBC # BLD: 0 /100 WBCS — SIGNIFICANT CHANGE UP (ref 0–0)
PHOSPHATE SERPL-MCNC: 6.4 MG/DL — HIGH (ref 2.5–4.5)
PLATELET # BLD AUTO: 62 K/UL — LOW (ref 150–400)
PLATELET # BLD AUTO: 68 K/UL — LOW (ref 150–400)
PLATELET # BLD AUTO: 79 K/UL — LOW (ref 150–400)
POTASSIUM SERPL-MCNC: 4 MMOL/L — SIGNIFICANT CHANGE UP (ref 3.5–5.3)
POTASSIUM SERPL-SCNC: 4 MMOL/L — SIGNIFICANT CHANGE UP (ref 3.5–5.3)
RBC # BLD: 2.7 M/UL — LOW (ref 4.2–5.8)
RBC # BLD: 2.78 M/UL — LOW (ref 4.2–5.8)
RBC # BLD: 2.89 M/UL — LOW (ref 4.2–5.8)
RBC # FLD: 16.7 % — HIGH (ref 10.3–14.5)
RBC # FLD: 16.8 % — HIGH (ref 10.3–14.5)
RBC # FLD: 16.9 % — HIGH (ref 10.3–14.5)
SODIUM SERPL-SCNC: 139 MMOL/L — SIGNIFICANT CHANGE UP (ref 135–145)
TACROLIMUS SERPL-MCNC: 9.2 NG/ML — SIGNIFICANT CHANGE UP
UFH PPP CHRO-ACNC: 0.25 IU/ML — LOW (ref 0.3–0.7)
WBC # BLD: 3.63 K/UL — LOW (ref 3.8–10.5)
WBC # BLD: 5.03 K/UL — SIGNIFICANT CHANGE UP (ref 3.8–10.5)
WBC # BLD: 6.19 K/UL — SIGNIFICANT CHANGE UP (ref 3.8–10.5)
WBC # FLD AUTO: 3.63 K/UL — LOW (ref 3.8–10.5)
WBC # FLD AUTO: 5.03 K/UL — SIGNIFICANT CHANGE UP (ref 3.8–10.5)
WBC # FLD AUTO: 6.19 K/UL — SIGNIFICANT CHANGE UP (ref 3.8–10.5)

## 2023-07-21 PROCEDURE — 99233 SBSQ HOSP IP/OBS HIGH 50: CPT | Mod: GC

## 2023-07-21 PROCEDURE — 76776 US EXAM K TRANSPL W/DOPPLER: CPT | Mod: 26,LT

## 2023-07-21 PROCEDURE — 99232 SBSQ HOSP IP/OBS MODERATE 35: CPT | Mod: GC,24

## 2023-07-21 RX ORDER — INSULIN GLARGINE 100 [IU]/ML
20 INJECTION, SOLUTION SUBCUTANEOUS AT BEDTIME
Refills: 0 | Status: DISCONTINUED | OUTPATIENT
Start: 2023-07-21 | End: 2023-07-23

## 2023-07-21 RX ORDER — HEPARIN SODIUM 5000 [USP'U]/ML
1000 INJECTION INTRAVENOUS; SUBCUTANEOUS
Qty: 25000 | Refills: 0 | Status: DISCONTINUED | OUTPATIENT
Start: 2023-07-21 | End: 2023-07-22

## 2023-07-21 RX ORDER — ERYTHROPOIETIN 10000 [IU]/ML
10000 INJECTION, SOLUTION INTRAVENOUS; SUBCUTANEOUS ONCE
Refills: 0 | Status: COMPLETED | OUTPATIENT
Start: 2023-07-21 | End: 2023-07-21

## 2023-07-21 RX ORDER — ACETAMINOPHEN 500 MG
650 TABLET ORAL ONCE
Refills: 0 | Status: COMPLETED | OUTPATIENT
Start: 2023-07-21 | End: 2023-07-21

## 2023-07-21 RX ORDER — HEPARIN SODIUM 5000 [USP'U]/ML
900 INJECTION INTRAVENOUS; SUBCUTANEOUS
Qty: 25000 | Refills: 0 | Status: DISCONTINUED | OUTPATIENT
Start: 2023-07-21 | End: 2023-07-21

## 2023-07-21 RX ORDER — INSULIN LISPRO 100/ML
8 VIAL (ML) SUBCUTANEOUS
Refills: 0 | Status: DISCONTINUED | OUTPATIENT
Start: 2023-07-21 | End: 2023-07-24

## 2023-07-21 RX ORDER — HEPARIN SODIUM 5000 [USP'U]/ML
800 INJECTION INTRAVENOUS; SUBCUTANEOUS
Qty: 25000 | Refills: 0 | Status: DISCONTINUED | OUTPATIENT
Start: 2023-07-21 | End: 2023-07-21

## 2023-07-21 RX ORDER — DIPHENHYDRAMINE HCL 50 MG
50 CAPSULE ORAL ONCE
Refills: 0 | Status: COMPLETED | OUTPATIENT
Start: 2023-07-21 | End: 2023-07-21

## 2023-07-21 RX ADMIN — HEPARIN SODIUM 9 UNIT(S)/HR: 5000 INJECTION INTRAVENOUS; SUBCUTANEOUS at 15:32

## 2023-07-21 RX ADMIN — Medication 30 MILLIGRAM(S): at 12:12

## 2023-07-21 RX ADMIN — FAMOTIDINE 20 MILLIGRAM(S): 10 INJECTION INTRAVENOUS at 12:13

## 2023-07-21 RX ADMIN — Medication 50 MILLIGRAM(S): at 12:12

## 2023-07-21 RX ADMIN — ATORVASTATIN CALCIUM 40 MILLIGRAM(S): 80 TABLET, FILM COATED ORAL at 21:56

## 2023-07-21 RX ADMIN — SEVELAMER CARBONATE 1600 MILLIGRAM(S): 2400 POWDER, FOR SUSPENSION ORAL at 17:39

## 2023-07-21 RX ADMIN — Medication 8 UNIT(S): at 17:40

## 2023-07-21 RX ADMIN — TRAMADOL HYDROCHLORIDE 50 MILLIGRAM(S): 50 TABLET ORAL at 19:32

## 2023-07-21 RX ADMIN — TRAMADOL HYDROCHLORIDE 50 MILLIGRAM(S): 50 TABLET ORAL at 06:25

## 2023-07-21 RX ADMIN — Medication 5 UNIT(S): at 08:09

## 2023-07-21 RX ADMIN — TRAMADOL HYDROCHLORIDE 50 MILLIGRAM(S): 50 TABLET ORAL at 07:05

## 2023-07-21 RX ADMIN — MYCOPHENOLATE MOFETIL 1 GRAM(S): 250 CAPSULE ORAL at 08:08

## 2023-07-21 RX ADMIN — Medication 4: at 12:13

## 2023-07-21 RX ADMIN — TACROLIMUS 6 MILLIGRAM(S): 5 CAPSULE ORAL at 08:12

## 2023-07-21 RX ADMIN — Medication 8 UNIT(S): at 12:13

## 2023-07-21 RX ADMIN — Medication 500000 UNIT(S): at 05:36

## 2023-07-21 RX ADMIN — SEVELAMER CARBONATE 1600 MILLIGRAM(S): 2400 POWDER, FOR SUSPENSION ORAL at 08:09

## 2023-07-21 RX ADMIN — HEPARIN SODIUM 10 UNIT(S)/HR: 5000 INJECTION INTRAVENOUS; SUBCUTANEOUS at 23:10

## 2023-07-21 RX ADMIN — Medication 6: at 08:09

## 2023-07-21 RX ADMIN — Medication 650 MILLIGRAM(S): at 12:12

## 2023-07-21 RX ADMIN — MYCOPHENOLATE MOFETIL 1 GRAM(S): 250 CAPSULE ORAL at 19:33

## 2023-07-21 RX ADMIN — Medication 4: at 21:57

## 2023-07-21 RX ADMIN — TRAMADOL HYDROCHLORIDE 50 MILLIGRAM(S): 50 TABLET ORAL at 20:15

## 2023-07-21 RX ADMIN — Medication 500000 UNIT(S): at 17:38

## 2023-07-21 RX ADMIN — Medication 81 MILLIGRAM(S): at 12:13

## 2023-07-21 RX ADMIN — Medication 1 TABLET(S): at 12:13

## 2023-07-21 RX ADMIN — Medication 500000 UNIT(S): at 12:12

## 2023-07-21 RX ADMIN — Medication 2: at 17:39

## 2023-07-21 RX ADMIN — ERYTHROPOIETIN 10000 UNIT(S): 10000 INJECTION, SOLUTION INTRAVENOUS; SUBCUTANEOUS at 14:05

## 2023-07-21 RX ADMIN — HEPARIN SODIUM 8 UNIT(S)/HR: 5000 INJECTION INTRAVENOUS; SUBCUTANEOUS at 08:07

## 2023-07-21 RX ADMIN — SEVELAMER CARBONATE 1600 MILLIGRAM(S): 2400 POWDER, FOR SUSPENSION ORAL at 12:12

## 2023-07-21 RX ADMIN — INSULIN GLARGINE 20 UNIT(S): 100 INJECTION, SOLUTION SUBCUTANEOUS at 21:57

## 2023-07-21 RX ADMIN — VALGANCICLOVIR 450 MILLIGRAM(S): 450 TABLET, FILM COATED ORAL at 05:36

## 2023-07-21 NOTE — PROGRESS NOTE ADULT - SUBJECTIVE AND OBJECTIVE BOX
Transplant Surgery - Multidisciplinary Progress Note  --------------------------------------------------------------  DDRT 7/16/23 POD#5    Pt seen and examined by the multidisciplinary team: Surgery: Dr. Muñoz   and Nephrology: Dr. Aguilar and Residents/fellows, PAs, and unit RN. Rest of disciplines not in attendance to be notified of plan    54 yo M with hx of DM2 CAD, CHF, s/p CABG 2015, AICD (2016), on hemodialysis for approximately 6 years via L AVF (nephrologist Dr. Bharath Romo), He has hx of PVD, is s/p 4 stents in R leg (mid and distal right superficial femoral artery, right popliteal x2 on 1/14/22).; is sp RT big toe and second toe amputation 2021, on asa and plavix for severe PVD (per pt).   Now here for DDRT. Pt reports dose not make urine only few drops occasionally. Pt denies N/V/D, fevers/chills, CP, SOB. Pt reports last ate at 4pm today and had HD yesterday 7/14/23.    Underwent L DDRT with CIT 16hrs, 1A,1V,1U. Started on insulin drip intraoperatively for hyperkalemia after reperfusion. He was started on pressors (Levo - 0.04) intraoperatively and received 2U PRBC. Got Simulect and solumedrol for induction.     Interval events:   -HD -2L, plan to repeat today   -Hep gtt low ptt goal 40-60  -granda removed       MEDICATIONS  (STANDING):  aspirin  chewable 81 milliGRAM(s) Oral daily  atorvastatin 40 milliGRAM(s) Oral at bedtime  chlorhexidine 4% Liquid 1 Application(s) Topical <User Schedule>  famotidine    Tablet 20 milliGRAM(s) Oral daily  heparin  Infusion 800 Unit(s)/Hr (8 mL/Hr) IV Continuous <Continuous>  insulin glargine Injectable (LANTUS) 20 Unit(s) SubCutaneous at bedtime  insulin lispro (ADMELOG) corrective regimen sliding scale   SubCutaneous Before meals and at bedtime  insulin lispro Injectable (ADMELOG) 8 Unit(s) SubCutaneous three times a day before meals  mycophenolate mofetil 1 Gram(s) Oral <User Schedule>  nystatin    Suspension 293276 Unit(s) Swish and Swallow four times a day  polyethylene glycol 3350 17 Gram(s) Oral two times a day  senna 2 Tablet(s) Oral at bedtime  sevelamer carbonate 1600 milliGRAM(s) Oral three times a day with meals  tacrolimus ER Tablet (ENVARSUS XR) 6 milliGRAM(s) Oral <User Schedule>  trimethoprim   80 mG/sulfamethoxazole 400 mG 1 Tablet(s) Oral daily  valGANciclovir 450 milliGRAM(s) Oral <User Schedule>    MEDICATIONS  (PRN):  sodium chloride 0.9% lock flush 10 milliLiter(s) IV Push every 1 hour PRN Pre/post blood products, medications, blood draw, and to maintain line patency  traMADol 25 milliGRAM(s) Oral every 6 hours PRN Moderate Pain (4 - 6)  traMADol 50 milliGRAM(s) Oral every 6 hours PRN Severe Pain (7 - 10)      PAST MEDICAL & SURGICAL HISTORY:  Diabetes mellitus  type 2      Foot ulcer due to secondary DM      Coronary artery disease      Acute on chronic systolic heart failure      Breast Reduction  at age 18      Toe amputation status, left      S/P CABG (coronary artery bypass graft)      AICD (automatic cardioverter/defibrillator) present          Vital Signs Last 24 Hrs  T(C): 36.7 (21 Jul 2023 04:53), Max: 36.9 (20 Jul 2023 12:49)  T(F): 98 (21 Jul 2023 04:53), Max: 98.4 (20 Jul 2023 12:49)  HR: 81 (21 Jul 2023 04:53) (74 - 97)  BP: 150/81 (21 Jul 2023 04:53) (103/57 - 151/67)  BP(mean): 96 (21 Jul 2023 01:30) (75 - 97)  RR: 18 (21 Jul 2023 04:53) (16 - 20)  SpO2: 98% (21 Jul 2023 04:53) (95% - 100%)    Parameters below as of 21 Jul 2023 04:53  Patient On (Oxygen Delivery Method): room air        I&O's Summary    20 Jul 2023 07:01  -  21 Jul 2023 07:00  --------------------------------------------------------  IN: 2209.3 mL / OUT: 2202.5 mL / NET: 6.8 mL    21 Jul 2023 07:01  -  21 Jul 2023 10:22  --------------------------------------------------------  IN: 256 mL / OUT: 30 mL / NET: 226 mL                              8.6    5.03  )-----------( 79       ( 21 Jul 2023 05:26 )             27.1     07-21    139  |  97  |  52<H>  ----------------------------<  290<H>  4.0   |  27  |  4.88<H>    Ca    9.4      21 Jul 2023 05:26  Phos  6.4     07-21  Mg     2.4     07-21      Tacrolimus (), Serum: 9.2 ng/mL (07-21 @ 05:26)    PHYSICAL EXAM:  GENERAL: NAD, well-groomed, well-developed  HEAD:  Atraumatic, Normocephalic  EYES: EOMI, PERRLA, conjunctiva and sclera clear  NECK: Supple, No JVD, L IJ TLC CDI  CHEST/LUNG: non-labored breathing on room air.   HEART: Regular rate and rhythm; S1/S2  ABDOMEN: Soft, Non-distended, LLQ incision with staples in place healing well. Miles drain with bulb suction SS output, no signs of infection.  : no granda, no uop  VASCULAR: Normal pulses, Normal capillary refill  EXTREMITIES:  2+ Peripheral Pulses, No cyanosis, L hand edema, LE edema improving b/l. AVF with + thrill.   SKIN: Warm, Intact

## 2023-07-21 NOTE — PROVIDER CONTACT NOTE (MEDICATION) - ASSESSMENT
is heparin order going to be changed? level was 32.7, just assuring if any changes are going to made to patients medication.
patient pTT 32.7, any changes to heparin gtt?

## 2023-07-21 NOTE — PROGRESS NOTE ADULT - PROBLEM SELECTOR PLAN 3
Pt. with anemia in the setting of ESRD/DGF. Hgb is low at 8.6 today. Recommend EPO qweekly. Monitor hgb.    If you have any questions, please feel free to contact me  Enrique Nagel  Nephrology Fellow  [phone] / [employer] Teams(Preferred)  (After 5pm or on weekends please page the on-call fellow)

## 2023-07-21 NOTE — PROGRESS NOTE ADULT - ASSESSMENT
52 yo M with h/o ESRD on HD x6 years via LUE AVF (outpatient nephrologist: Dr. Bharath Romo), DM2 CAD, CHF, underwent CABG 2015, AICD (2016), and severe PVD (underwent R big toe and second toe amputation in 2021) initially presented for kidney transplant. Pt. underwent DDRT on 7/16. Pt. was transferred to SICU for IV vasopressor support, now off and transferred to medical floor. Post-transplant course significant for DGF. Pt. received first HD treatment on 7/17, last on 7/20.

## 2023-07-21 NOTE — PROGRESS NOTE ADULT - ASSESSMENT
54 yo M with hx of DM2 CAD, CHF, s/p CABG 2015, AICD (2016), on hemodialysis for approximately 6 years via L AVF (nephrologist Dr. Bharath Romo), He has hx of PVD, is s/p 4 stents in R leg (mid and distal right superficial femoral artery, right popliteal x2 on 1/14/22).; is sp RT big toe and second toe amputation 2021, on asa and plavix for severe PVD (per pt). Now here for possible DDRT. Pt reports does not make urine only few drops occasionally. Pt denies N/V/D, fevers/chills, CP, SOB. Pt reports last ate at 4pm today and had HD yesterday 7/14/23.  Nephrology consulted for ESRD s/p DDRT.      ESRD on HD [facility]  Nephrologist : Dr. Thomas / Dr. Brooke at [facility] [place]   s/p DDRT 7/16   s/p HD 7/19, 7/20  HD per transplant team   HD again today   HD consent in chart.   Renal diet   Monitor     s/p DDRT @ [facility] 7/16/2023   Underwent L DDRT 7/13/2023 s/p Simulect and solumedrol for induction.   Started on insulin drip intraoperatively for hyperkalemia after reperfusion and transferred to SICU   Monitor BMP, urine output   Pt anuric at present, likely will require HD as present   Repeat graft US with patent vessels  Management per Transplant  LUE swelling US with left sub clavian vein stenosis and thrombosis. Vascular following & recommending CTV and started on heparin gtt.   Continue immunosuppressants per transplant : Envarsus 6 mg qd, Cellcept 1 gm BID, and prednisone taper.   Initially received Simulect induction - Changed to Thymoglobulin given delayed graft function. Dose #2 today   Valcyte to every other day (MWF) Nystatin, Bactrim renal dose  FK level 9.2 on 7/21  Check tacrolimus trough 30 minutes prior to AM dose  Monitor urine output, creatinine, LIANA.       Hyperkalemia   in setting of ESRD and recent DDRT with sub-optimally functioning allograft.   s/p HD 7/19 and 7/20   Low K diet   HD today   Mgmt per Transplant  Monitor     HTN:   BP fluctuates   Coreg reduced to 3.125mg po bid due to low BP.   Monitor     Hyperphosphatemia  Low PO4 diet  Phoslo switched to sevelamer 1600 mg tid -- continue at present   Monitor PO4 daily

## 2023-07-21 NOTE — PROGRESS NOTE ADULT - ASSESSMENT
54 yo M with hx of DM2 CAD, CHF, s/p CABG 2015, AICD (2016), on hemodialysis for approximately 6 years via L AVF (nephrologist Dr. Bharath Romo), He has hx of PVD, is s/p 4 stents in R leg (mid and distal right superficial femoral artery, right popliteal x2 on 1/14/22).; is sp RT big toe and second toe amputation 2021, on asa and plavix for severe PVD (per pt).   Now here for DDRT. Pt reports dose not make urine only few drops occasionally. Pt denies N/V/D, fevers/chills, CP, SOB. Pt reports last ate at 4pm today and had HD yesterday 7/14/23.    Underwent L DDRT with CIT 16hrs, 1A,1V,1U. Started on insulin drip intraoperatively for hyperkalemia after reperfusion. He was started on pressors (Levo - 0.004) intraoperatively and received 2U PRBC. Got Simulect and solumedrol for induction.     Plan:    POD#5- L DDRT  DGF---> Continues on HD for now for fluid removal.  Will repeat sono today 7/21  Monitor urine output, creatinine, LIANA.   Restarted on home aspirin. Holding plavix as the patient was started on heparin gtt.  Continue diabetic diet.    Immunosuppression:   - Switched from Simulect to Thymoglobulin.   - MMF, Methylpred taper, Env by level  - Valcyte to every other day (MWF)     DM:   - Monitor FS, patient now on ISS  - Started pre meal and Lantus, will adjust to goal     PAD:   - Aspirin restarted  - Plavix held     LUE swelling.   - LUE US : prelim with SC stenosis vs thrombus (known per patient)   - Vascular evaluation of AV,  started on heparin gtt ptt goal 40-60  - vascular planning Monday fistulogram

## 2023-07-21 NOTE — PROGRESS NOTE ADULT - SUBJECTIVE AND OBJECTIVE BOX
DATE OF SERVICE: 07-21-23 @ 13:52  CHIEF COMPLAINT:Patient is a 53y old  Male who presents with a chief complaint of Possible DDRT (21 Jul 2023 10:58)    	        PAST MEDICAL & SURGICAL HISTORY:  Diabetes mellitus  type 2      Foot ulcer due to secondary DM      Coronary artery disease      Acute on chronic systolic heart failure      Breast Reduction  at age 18      Toe amputation status, left      S/P CABG (coronary artery bypass graft)      AICD (automatic cardioverter/defibrillator) present              REVIEW OF SYSTEMS:  less swelling  NECK: No pain or stiffness  RESPIRATORY: No cough, wheezing, chills or hemoptysis; No Shortness of Breath  CARDIOVASCULAR: No chest pain, palpitations, passing out,   GASTROINTESTINAL: No abdominal or epigastric pain. No nausea, vomiting,   GENITOURINARY: No dysuria, frequency, hematuria, or incontinence  NEUROLOGICAL: No headaches,    Medications:  MEDICATIONS  (STANDING):  aspirin  chewable 81 milliGRAM(s) Oral daily  atorvastatin 40 milliGRAM(s) Oral at bedtime  chlorhexidine 4% Liquid 1 Application(s) Topical <User Schedule>  epoetin arian (EPOGEN) Injectable 48234 Unit(s) SubCutaneous once  famotidine    Tablet 20 milliGRAM(s) Oral daily  heparin  Infusion 800 Unit(s)/Hr (8 mL/Hr) IV Continuous <Continuous>  insulin glargine Injectable (LANTUS) 20 Unit(s) SubCutaneous at bedtime  insulin lispro (ADMELOG) corrective regimen sliding scale   SubCutaneous Before meals and at bedtime  insulin lispro Injectable (ADMELOG) 8 Unit(s) SubCutaneous three times a day before meals  mycophenolate mofetil 1 Gram(s) Oral <User Schedule>  nystatin    Suspension 161432 Unit(s) Swish and Swallow four times a day  polyethylene glycol 3350 17 Gram(s) Oral two times a day  senna 2 Tablet(s) Oral at bedtime  sevelamer carbonate 1600 milliGRAM(s) Oral three times a day with meals  tacrolimus ER Tablet (ENVARSUS XR) 6 milliGRAM(s) Oral <User Schedule>  trimethoprim   80 mG/sulfamethoxazole 400 mG 1 Tablet(s) Oral daily  valGANciclovir 450 milliGRAM(s) Oral <User Schedule>    MEDICATIONS  (PRN):  sodium chloride 0.9% lock flush 10 milliLiter(s) IV Push every 1 hour PRN Pre/post blood products, medications, blood draw, and to maintain line patency  traMADol 25 milliGRAM(s) Oral every 6 hours PRN Moderate Pain (4 - 6)  traMADol 50 milliGRAM(s) Oral every 6 hours PRN Severe Pain (7 - 10)    	    PHYSICAL EXAM:  T(C): 36.6 (07-21-23 @ 10:23), Max: 36.8 (07-21-23 @ 01:30)  HR: 85 (07-21-23 @ 10:23) (81 - 97)  BP: 115/59 (07-21-23 @ 10:23) (103/57 - 151/67)  RR: 20 (07-21-23 @ 10:23) (16 - 20)  SpO2: 99% (07-21-23 @ 10:23) (95% - 100%)  Wt(kg): --  I&O's Summary    20 Jul 2023 07:01  -  21 Jul 2023 07:00  --------------------------------------------------------  IN: 2209.3 mL / OUT: 2202.5 mL / NET: 6.8 mL    21 Jul 2023 07:01  -  21 Jul 2023 13:52  --------------------------------------------------------  IN: 520 mL / OUT: 55 mL / NET: 465 mL        Appearance: Normal	  HEENT:   Normal oral mucosa, PERRL, EOMI	  Lymphatic: No lymphadenopathy  Cardiovascular: Normal S1 S2, No JVD, N  Respiratory: Lungs clear to auscultation	  l arm less swollen  Gastrointestinal:  Soft, Non-tender, + BS	  	  Neurologic: Non-focal  Extremities: Normal range of motion,   TELEMETRY: 	    ECG:  	  RADIOLOGY:  OTHER: 	  	  LABS:	 	    CARDIAC MARKERS:                                8.6    5.03  )-----------( 79       ( 21 Jul 2023 05:26 )             27.1     07-21    139  |  97  |  52<H>  ----------------------------<  290<H>  4.0   |  27  |  4.88<H>    Ca    9.4      21 Jul 2023 05:26  Phos  6.4     07-21  Mg     2.4     07-21      proBNP:   Lipid Profile:   HgA1c:   TSH:

## 2023-07-21 NOTE — PROVIDER CONTACT NOTE (MEDICATION) - RECOMMENDATIONS
please follow up with appropriate interventions
if changes made to heparin gtt please reorder for patient specific nomogram

## 2023-07-21 NOTE — PROGRESS NOTE ADULT - SUBJECTIVE AND OBJECTIVE BOX
Subjective: Patient seen and examined. No new events except as noted.     SUBJECTIVE/ROS:  nad      MEDICATIONS:  MEDICATIONS  (STANDING):  acetaminophen     Tablet .. 650 milliGRAM(s) Oral once  antithymocyte globulin rabbit (peripheral) IVPB w/additives 150 milliGRAM(s) IV Intermittent once  aspirin  chewable 81 milliGRAM(s) Oral daily  atorvastatin 40 milliGRAM(s) Oral at bedtime  chlorhexidine 4% Liquid 1 Application(s) Topical <User Schedule>  diphenhydrAMINE 50 milliGRAM(s) Oral once  famotidine    Tablet 20 milliGRAM(s) Oral daily  heparin  Infusion 800 Unit(s)/Hr (8 mL/Hr) IV Continuous <Continuous>  insulin glargine Injectable (LANTUS) 20 Unit(s) SubCutaneous at bedtime  insulin lispro (ADMELOG) corrective regimen sliding scale   SubCutaneous Before meals and at bedtime  insulin lispro Injectable (ADMELOG) 8 Unit(s) SubCutaneous three times a day before meals  methylPREDNISolone sodium succinate Injectable 30 milliGRAM(s) IV Push once  mycophenolate mofetil 1 Gram(s) Oral <User Schedule>  nystatin    Suspension 364256 Unit(s) Swish and Swallow four times a day  polyethylene glycol 3350 17 Gram(s) Oral two times a day  senna 2 Tablet(s) Oral at bedtime  sevelamer carbonate 1600 milliGRAM(s) Oral three times a day with meals  tacrolimus ER Tablet (ENVARSUS XR) 6 milliGRAM(s) Oral <User Schedule>  trimethoprim   80 mG/sulfamethoxazole 400 mG 1 Tablet(s) Oral daily  valGANciclovir 450 milliGRAM(s) Oral <User Schedule>      PHYSICAL EXAM:  T(C): 36.6 (07-21-23 @ 10:23), Max: 36.9 (07-20-23 @ 12:49)  HR: 85 (07-21-23 @ 10:23) (74 - 97)  BP: 115/59 (07-21-23 @ 10:23) (103/57 - 151/67)  RR: 20 (07-21-23 @ 10:23) (16 - 20)  SpO2: 99% (07-21-23 @ 10:23) (95% - 100%)  Wt(kg): --  I&O's Summary    20 Jul 2023 07:01  -  21 Jul 2023 07:00  --------------------------------------------------------  IN: 2209.3 mL / OUT: 2202.5 mL / NET: 6.8 mL    21 Jul 2023 07:01  -  21 Jul 2023 10:37  --------------------------------------------------------  IN: 256 mL / OUT: 30 mL / NET: 226 mL            JVP: Normal  Neck: supple  Lung: clear   CV: S1 S2 , Murmur:  Abd: soft  Ext: No edema  neuro: Awake / alert  Psych: flat affect  Skin: normal``    LABS/DATA:    CARDIAC MARKERS:                                8.6    5.03  )-----------( 79       ( 21 Jul 2023 05:26 )             27.1     07-21    139  |  97  |  52<H>  ----------------------------<  290<H>  4.0   |  27  |  4.88<H>    Ca    9.4      21 Jul 2023 05:26  Phos  6.4     07-21  Mg     2.4     07-21      proBNP:   Lipid Profile:   HgA1c:   TSH:     TELE:  EKG:

## 2023-07-21 NOTE — PROGRESS NOTE ADULT - PROBLEM SELECTOR PLAN 1
Pt. with h/o ESRD on HD. Underwent DDRT on 7/16/23. Post-transplant course significant for DGF, pt. received first HD treatment on 7/17, last on 7/19. Pt. is anuric, documented urine output is 25 cc over the past 24 hours. Pt. clinically stable. Labs reviewed. Plan for HD treatment today with 2L UF. Vascular consult note reviewed: recommending therapeutic AC and elevation. Continue with immunosuppression, as outlined below. Pt. will likely require HD following discharge. Monitor labs and urine output. Avoid nephrotoxins. Dose medications as per eGFR.

## 2023-07-21 NOTE — PROVIDER CONTACT NOTE (MEDICATION) - ACTION/TREATMENT ORDERED:
will follow up with appropriate orders, continue nomogram protocol
changing dose, please continue monitoring per protocol

## 2023-07-21 NOTE — PROGRESS NOTE ADULT - SUBJECTIVE AND OBJECTIVE BOX
[facility]   MD DANIEL WALDEN MD BRIANA PETITO, NP    TEL:  FROM 9 AM to 5 PM ---OFFICE: [phone]  FROM 5 PM - 9 AM PLEASE CALL ANSWERING SERVICE: [phone]     RENAL PROGRESS NOTE: DATE OF SERVICE 07-21-23 @ 16:10  Patient is a 53y old  Male who presents with a chief complaint of Possible DDRT     Patient seen and examined at bedside. No chest pain/sob. c/o soreness near surgical incision     VITALS:  T(F): 97.6 (07-21-23 @ 15:20), Max: 98.2 (07-21-23 @ 01:30)  HR: 85 (07-21-23 @ 15:20)  BP: 106/41 (07-21-23 @ 15:20)  RR: 17 (07-21-23 @ 15:20)  SpO2: 100% (07-21-23 @ 15:20)  Wt(kg): --    07-20 @ 07:01  -  07-21 @ 07:00  --------------------------------------------------------  IN: 2209.3 mL / OUT: 2202.5 mL / NET: 6.8 mL    07-21 @ 07:01  -  07-21 @ 16:10  --------------------------------------------------------  IN: 536 mL / OUT: 55 mL / NET: 481 mL      PHYSICAL EXAM:  Constitutional: NAD  Neck: No JVD  Respiratory: CTAB, no wheezes, rales or rhonchi  Cardiovascular: S1, S2, RRR  Gastrointestinal: BS+, soft, NT/ND. Surgical incision c/d/i, LIANA drain in place.   Extremities: No peripheral edema  Access: L AVF       Hospital Medications:   MEDICATIONS  (STANDING):  aspirin  chewable 81 milliGRAM(s) Oral daily  atorvastatin 40 milliGRAM(s) Oral at bedtime  chlorhexidine 4% Liquid 1 Application(s) Topical <User Schedule>  famotidine    Tablet 20 milliGRAM(s) Oral daily  heparin  Infusion 900 Unit(s)/Hr (9 mL/Hr) IV Continuous <Continuous>  insulin glargine Injectable (LANTUS) 20 Unit(s) SubCutaneous at bedtime  insulin lispro (ADMELOG) corrective regimen sliding scale   SubCutaneous Before meals and at bedtime  insulin lispro Injectable (ADMELOG) 8 Unit(s) SubCutaneous three times a day before meals  mycophenolate mofetil 1 Gram(s) Oral <User Schedule>  nystatin    Suspension 311690 Unit(s) Swish and Swallow four times a day  polyethylene glycol 3350 17 Gram(s) Oral two times a day  senna 2 Tablet(s) Oral at bedtime  sevelamer carbonate 1600 milliGRAM(s) Oral three times a day with meals  tacrolimus ER Tablet (ENVARSUS XR) 6 milliGRAM(s) Oral <User Schedule>  trimethoprim   80 mG/sulfamethoxazole 400 mG 1 Tablet(s) Oral daily  valGANciclovir 450 milliGRAM(s) Oral <User Schedule>    Tacrolimus (), Serum: 9.2 ng/mL (07-21 @ 05:26)    LABS:  07-21    139  |  97  |  52<H>  ----------------------------<  290<H>  4.0   |  27  |  4.88<H>    Ca    9.4      21 Jul 2023 05:26  Phos  6.4     07-21  Mg     2.4     07-21      Creatinine Trend: 4.88 <--, 5.24 <--, 7.32 <--, 6.80 <--, 6.45 <--, 5.92 <--, 5.64 <--, 5.33 <--, 4.87 <--, 8.23 <--, 8.18 <--, 7.87 <--, 7.92 <--, 8.05 <--, 7.73 <--, 6.97 <--, 7.03 <--, 6.55 <--, 7.14 <--    Phosphorus: 6.4 mg/dL (07-21 @ 05:26)                              8.2    6.19  )-----------( 68 ( 21 Jul 2023 14:01 )             25.4     Urine Studies:  Urinalysis - [07-21-23 @ 05:26]      Color  / Appearance  / SG  / pH       Gluc 290 / Ketone   / Bili  / Urobili        Blood  / Protein  / Leuk Est  / Nitrite       RBC  / WBC  / Hyaline  / Gran  / Sq Epi  / Non Sq Epi  / Bacteria       TSH 5.16      [07-22-22 @ 04:43]    HBsAb 5.7      [07-15-23 @ 21:22]  HBsAg Nonreact      [07-15-23 @ 21:21]  HBcAb Nonreact      [07-15-23 @ 21:22]  HCV 0.16, Nonreact      [07-15-23 @ 21:22]  HIV Nonreact      [07-15-23 @ 21:21]      RADIOLOGY & ADDITIONAL STUDIES:

## 2023-07-21 NOTE — PROGRESS NOTE ADULT - ASSESSMENT
Patient is a 53y Male  with hx of DM2 CAD, CHF, s/p CABG 2015, AICD (2016), on hemodialysis for approximately 6 years via L AVF (nephrologist Dr. Bharath Romo), He has hx of PVD, is s/p 4 stents in R leg (mid and distal right superficial femoral artery, right popliteal x2 on 1/14/22).  s/p  RT big toe and second toe amputation 2021, on asa and plavix for severe PVD (per pt).   Patient is s/p DDRT 7/16, requiring levophed drip and insulin drip post op with hyperkalemia, transferred to SICU         CVS: Post op hypotension  improved  on regular floor    - diet as tolerated  - Bowel regimen     Renal: s/p DDRT for ESRD 2/2 DM  -has left arm fistula,   hyperkalemia  hd as per renal  - care per renal transplant regarding: tc / meds  - Monitor I&Os and electrolytes  - replete electrolytes as needed   hd as per renal      Heme: stable h/h  - Monitor CBC and coags  -  VTE prophylaxis: hold  -SCD's        Endo: hx of DM,   - Hyperglycemia requring insulin drip d/c d    now fsg riss    steroid taper \  for l subclavian procedure on monday     -oob

## 2023-07-21 NOTE — PROGRESS NOTE ADULT - SUBJECTIVE AND OBJECTIVE BOX
[facility] DIVISION OF KIDNEY DISEASES AND HYPERTENSION -- FOLLOW UP NOTE  --------------------------------------------------------------------------------    HPI: 54 yo M with h/o ESRD on HD x6 years via LUE AVF (outpatient nephrologist: Dr. Bharath Romo), DM2 CAD, CHF, underwent CABG 2015, AICD (2016), and severe PVD (underwent R big toe and second toe amputation in 2021) initially presented for kidney transplant. Pt. underwent DDRT on 7/16. Pt. was transferred to SICU for IV vasopressor support, now off and transferred to medical floor. Post-transplant course significant for DGF. Pt. received first HD treatment on 7/17, last on 7/20.    24 hour events/subjective: Pt. was seen and evaluated at bedside this morning. Pt. reports improvement in BL UE and LE edema following HD treatment yesterday. LUE swelling around AVF site is improving. Feels well overall. Denies any headaches, fevers/chills, chest pain, abdominal pain, and SOB.    PAST HISTORY  --------------------------------------------------------------------------------  No significant changes to PMH, PSH, FHx, SHx, unless otherwise noted    ALLERGIES & MEDICATIONS  --------------------------------------------------------------------------------  Allergies    Contrast. (Unknown)    Intolerances      Standing Inpatient Medications  acetaminophen     Tablet .. 650 milliGRAM(s) Oral once  antithymocyte globulin rabbit (peripheral) IVPB w/additives 150 milliGRAM(s) IV Intermittent once  aspirin  chewable 81 milliGRAM(s) Oral daily  atorvastatin 40 milliGRAM(s) Oral at bedtime  chlorhexidine 4% Liquid 1 Application(s) Topical <User Schedule>  diphenhydrAMINE 50 milliGRAM(s) Oral once  famotidine    Tablet 20 milliGRAM(s) Oral daily  heparin  Infusion 800 Unit(s)/Hr IV Continuous <Continuous>  insulin glargine Injectable (LANTUS) 20 Unit(s) SubCutaneous at bedtime  insulin lispro (ADMELOG) corrective regimen sliding scale   SubCutaneous Before meals and at bedtime  insulin lispro Injectable (ADMELOG) 8 Unit(s) SubCutaneous three times a day before meals  methylPREDNISolone sodium succinate Injectable 30 milliGRAM(s) IV Push once  mycophenolate mofetil 1 Gram(s) Oral <User Schedule>  nystatin    Suspension 006404 Unit(s) Swish and Swallow four times a day  polyethylene glycol 3350 17 Gram(s) Oral two times a day  senna 2 Tablet(s) Oral at bedtime  sevelamer carbonate 1600 milliGRAM(s) Oral three times a day with meals  tacrolimus ER Tablet (ENVARSUS XR) 6 milliGRAM(s) Oral <User Schedule>  trimethoprim   80 mG/sulfamethoxazole 400 mG 1 Tablet(s) Oral daily  valGANciclovir 450 milliGRAM(s) Oral <User Schedule>    PRN Inpatient Medications  sodium chloride 0.9% lock flush 10 milliLiter(s) IV Push every 1 hour PRN  traMADol 25 milliGRAM(s) Oral every 6 hours PRN  traMADol 50 milliGRAM(s) Oral every 6 hours PRN      REVIEW OF SYSTEMS  --------------------------------------------------------------------------------  See HPI    VITALS/PHYSICAL EXAM  --------------------------------------------------------------------------------  T(C): 36.6 (07-21-23 @ 10:23), Max: 36.9 (07-20-23 @ 12:49)  HR: 85 (07-21-23 @ 10:23) (74 - 97)  BP: 115/59 (07-21-23 @ 10:23) (103/57 - 151/67)  RR: 20 (07-21-23 @ 10:23) (16 - 20)  SpO2: 99% (07-21-23 @ 10:23) (95% - 100%)  Wt(kg): --    07-20-23 @ 07:01  -  07-21-23 @ 07:00  --------------------------------------------------------  IN: 2209.3 mL / OUT: 2202.5 mL / NET: 6.8 mL    07-21-23 @ 07:01  -  07-21-23 @ 10:58  --------------------------------------------------------  IN: 256 mL / OUT: 30 mL / NET: 226 mL    Physical Exam:  Gen: NAD  HEENT: PERRL, supple neck, clear oropharynx  Pulm: CTA B/L  CV: RRR, S1S2;  Abd: +BS, soft, nontender/nondistended  : No suprapubic tenderness  Transplant: Surgical site is c/d/i  Extremities: +BL UE and LE edema (improving)  Skin: Warm, without rashes  Vascular access: +LUE AVF with thrill and bruit appreciated, +swelling around AVF site (improving)    LABS/STUDIES  --------------------------------------------------------------------------------              8.6    5.03  >-----------<  79       [07-21-23 @ 05:26]              27.1     139  |  97  |  52  ----------------------------<  290      [07-21-23 @ 05:26]  4.0   |  27  |  4.88        Ca     9.4     [07-21-23 @ 05:26]      Mg     2.4     [07-21-23 @ 05:26]      Phos  6.4     [07-21-23 @ 05:26]    PTT: 95.6       [07-21-23 @ 05:26]    Creatinine Trend:  SCr 4.88 [07-21 @ 05:26]  SCr 5.24 [07-20 @ 06:35]  SCr 7.32 [07-19 @ 07:03]  SCr 6.80 [07-19 @ 00:09]  SCr 6.45 [07-18 @ 18:35]    Tacrolimus (), Serum: 9.2 ng/mL (07-21 @ 05:26)  Tacrolimus (), Serum: 9.1 ng/mL (07-20 @ 06:35)  Tacrolimus (), Serum: 14.9 ng/mL (07-19 @ 07:06)  Tacrolimus (), Serum: 13.6 ng/mL (07-18 @ 05:48)    TSH 5.16      [07-22-22 @ 04:43]    HBsAb 5.7      [07-15-23 @ 21:22]  HBsAg Nonreact      [07-15-23 @ 21:21]  HBcAb Nonreact      [07-15-23 @ 21:22]  HCV 0.16, Nonreact      [07-15-23 @ 21:22]  HIV Nonreact      [07-15-23 @ 21:21]

## 2023-07-22 LAB
ANION GAP SERPL CALC-SCNC: 15 MMOL/L — SIGNIFICANT CHANGE UP (ref 5–17)
APTT BLD: 25.2 SEC — LOW (ref 27.5–35.5)
APTT BLD: 47.1 SEC — HIGH (ref 27.5–35.5)
APTT BLD: 51.4 SEC — HIGH (ref 27.5–35.5)
BUN SERPL-MCNC: 50 MG/DL — HIGH (ref 7–23)
CALCIUM SERPL-MCNC: 9.1 MG/DL — SIGNIFICANT CHANGE UP (ref 8.4–10.5)
CHLORIDE SERPL-SCNC: 93 MMOL/L — LOW (ref 96–108)
CO2 SERPL-SCNC: 28 MMOL/L — SIGNIFICANT CHANGE UP (ref 22–31)
CREAT SERPL-MCNC: 4.45 MG/DL — HIGH (ref 0.5–1.3)
EGFR: 15 ML/MIN/1.73M2 — LOW
GLUCOSE BLDC GLUCOMTR-MCNC: 166 MG/DL — HIGH (ref 70–99)
GLUCOSE BLDC GLUCOMTR-MCNC: 210 MG/DL — HIGH (ref 70–99)
GLUCOSE BLDC GLUCOMTR-MCNC: 221 MG/DL — HIGH (ref 70–99)
GLUCOSE BLDC GLUCOMTR-MCNC: 224 MG/DL — HIGH (ref 70–99)
GLUCOSE SERPL-MCNC: 205 MG/DL — HIGH (ref 70–99)
HCT VFR BLD CALC: 26.8 % — LOW (ref 39–50)
HCT VFR BLD CALC: 26.9 % — LOW (ref 39–50)
HGB BLD-MCNC: 8.6 G/DL — LOW (ref 13–17)
HGB BLD-MCNC: 8.6 G/DL — LOW (ref 13–17)
MAGNESIUM SERPL-MCNC: 2.3 MG/DL — SIGNIFICANT CHANGE UP (ref 1.6–2.6)
MCHC RBC-ENTMCNC: 30.4 PG — SIGNIFICANT CHANGE UP (ref 27–34)
MCHC RBC-ENTMCNC: 30.5 PG — SIGNIFICANT CHANGE UP (ref 27–34)
MCHC RBC-ENTMCNC: 32 GM/DL — SIGNIFICANT CHANGE UP (ref 32–36)
MCHC RBC-ENTMCNC: 32.1 GM/DL — SIGNIFICANT CHANGE UP (ref 32–36)
MCV RBC AUTO: 94.7 FL — SIGNIFICANT CHANGE UP (ref 80–100)
MCV RBC AUTO: 95.4 FL — SIGNIFICANT CHANGE UP (ref 80–100)
NRBC # BLD: 0 /100 WBCS — SIGNIFICANT CHANGE UP (ref 0–0)
NRBC # BLD: 0 /100 WBCS — SIGNIFICANT CHANGE UP (ref 0–0)
PHOSPHATE SERPL-MCNC: 5 MG/DL — HIGH (ref 2.5–4.5)
PLATELET # BLD AUTO: 60 K/UL — LOW (ref 150–400)
PLATELET # BLD AUTO: 65 K/UL — LOW (ref 150–400)
POTASSIUM SERPL-MCNC: 3.6 MMOL/L — SIGNIFICANT CHANGE UP (ref 3.5–5.3)
POTASSIUM SERPL-SCNC: 3.6 MMOL/L — SIGNIFICANT CHANGE UP (ref 3.5–5.3)
RBC # BLD: 2.82 M/UL — LOW (ref 4.2–5.8)
RBC # BLD: 2.83 M/UL — LOW (ref 4.2–5.8)
RBC # FLD: 16.7 % — HIGH (ref 10.3–14.5)
RBC # FLD: 16.7 % — HIGH (ref 10.3–14.5)
SODIUM SERPL-SCNC: 136 MMOL/L — SIGNIFICANT CHANGE UP (ref 135–145)
TACROLIMUS SERPL-MCNC: 7.1 NG/ML — SIGNIFICANT CHANGE UP
UFH PPP CHRO-ACNC: 0.3 IU/ML — SIGNIFICANT CHANGE UP (ref 0.3–0.7)
WBC # BLD: 3.62 K/UL — LOW (ref 3.8–10.5)
WBC # BLD: 4.64 K/UL — SIGNIFICANT CHANGE UP (ref 3.8–10.5)
WBC # FLD AUTO: 3.62 K/UL — LOW (ref 3.8–10.5)
WBC # FLD AUTO: 4.64 K/UL — SIGNIFICANT CHANGE UP (ref 3.8–10.5)

## 2023-07-22 PROCEDURE — 99232 SBSQ HOSP IP/OBS MODERATE 35: CPT | Mod: 24

## 2023-07-22 PROCEDURE — 99233 SBSQ HOSP IP/OBS HIGH 50: CPT

## 2023-07-22 RX ORDER — CHLORHEXIDINE GLUCONATE 213 G/1000ML
1 SOLUTION TOPICAL DAILY
Refills: 0 | Status: DISCONTINUED | OUTPATIENT
Start: 2023-07-22 | End: 2023-07-24

## 2023-07-22 RX ORDER — ACETAMINOPHEN 500 MG
650 TABLET ORAL ONCE
Refills: 0 | Status: COMPLETED | OUTPATIENT
Start: 2023-07-22 | End: 2023-07-22

## 2023-07-22 RX ORDER — TACROLIMUS 5 MG/1
6 CAPSULE ORAL
Refills: 0 | Status: DISCONTINUED | OUTPATIENT
Start: 2023-07-22 | End: 2023-07-24

## 2023-07-22 RX ORDER — FUROSEMIDE 40 MG
80 TABLET ORAL
Refills: 0 | Status: COMPLETED | OUTPATIENT
Start: 2023-07-22 | End: 2023-07-22

## 2023-07-22 RX ORDER — DIPHENHYDRAMINE HCL 50 MG
50 CAPSULE ORAL ONCE
Refills: 0 | Status: COMPLETED | OUTPATIENT
Start: 2023-07-22 | End: 2023-07-22

## 2023-07-22 RX ORDER — HEPARIN SODIUM 5000 [USP'U]/ML
11 INJECTION INTRAVENOUS; SUBCUTANEOUS
Qty: 25000 | Refills: 0 | Status: DISCONTINUED | OUTPATIENT
Start: 2023-07-22 | End: 2023-07-23

## 2023-07-22 RX ADMIN — Medication 80 MILLIGRAM(S): at 13:09

## 2023-07-22 RX ADMIN — POLYETHYLENE GLYCOL 3350 17 GRAM(S): 17 POWDER, FOR SOLUTION ORAL at 17:19

## 2023-07-22 RX ADMIN — CHLORHEXIDINE GLUCONATE 1 APPLICATION(S): 213 SOLUTION TOPICAL at 13:13

## 2023-07-22 RX ADMIN — Medication 81 MILLIGRAM(S): at 13:01

## 2023-07-22 RX ADMIN — HEPARIN SODIUM 11 UNIT(S)/HR: 5000 INJECTION INTRAVENOUS; SUBCUTANEOUS at 19:58

## 2023-07-22 RX ADMIN — Medication 500000 UNIT(S): at 00:50

## 2023-07-22 RX ADMIN — SEVELAMER CARBONATE 1600 MILLIGRAM(S): 2400 POWDER, FOR SUSPENSION ORAL at 13:00

## 2023-07-22 RX ADMIN — Medication 4: at 22:35

## 2023-07-22 RX ADMIN — SEVELAMER CARBONATE 1600 MILLIGRAM(S): 2400 POWDER, FOR SUSPENSION ORAL at 08:42

## 2023-07-22 RX ADMIN — FAMOTIDINE 20 MILLIGRAM(S): 10 INJECTION INTRAVENOUS at 13:01

## 2023-07-22 RX ADMIN — TACROLIMUS 6 MILLIGRAM(S): 5 CAPSULE ORAL at 09:11

## 2023-07-22 RX ADMIN — Medication 80 MILLIGRAM(S): at 17:18

## 2023-07-22 RX ADMIN — HEPARIN SODIUM 11 UNIT(S)/HR: 5000 INJECTION INTRAVENOUS; SUBCUTANEOUS at 09:21

## 2023-07-22 RX ADMIN — INSULIN GLARGINE 20 UNIT(S): 100 INJECTION, SOLUTION SUBCUTANEOUS at 22:34

## 2023-07-22 RX ADMIN — Medication 4: at 17:19

## 2023-07-22 RX ADMIN — Medication 4: at 08:41

## 2023-07-22 RX ADMIN — Medication 2: at 13:00

## 2023-07-22 RX ADMIN — Medication 8 UNIT(S): at 08:42

## 2023-07-22 RX ADMIN — Medication 500000 UNIT(S): at 17:18

## 2023-07-22 RX ADMIN — Medication 8 UNIT(S): at 17:19

## 2023-07-22 RX ADMIN — SEVELAMER CARBONATE 1600 MILLIGRAM(S): 2400 POWDER, FOR SUSPENSION ORAL at 17:19

## 2023-07-22 RX ADMIN — MYCOPHENOLATE MOFETIL 1 GRAM(S): 250 CAPSULE ORAL at 19:56

## 2023-07-22 RX ADMIN — Medication 50 MILLIGRAM(S): at 12:59

## 2023-07-22 RX ADMIN — Medication 1 TABLET(S): at 13:00

## 2023-07-22 RX ADMIN — Medication 30 MILLIGRAM(S): at 12:59

## 2023-07-22 RX ADMIN — ATORVASTATIN CALCIUM 40 MILLIGRAM(S): 80 TABLET, FILM COATED ORAL at 21:38

## 2023-07-22 RX ADMIN — Medication 500000 UNIT(S): at 13:00

## 2023-07-22 RX ADMIN — Medication 8 UNIT(S): at 13:01

## 2023-07-22 RX ADMIN — Medication 500000 UNIT(S): at 22:36

## 2023-07-22 RX ADMIN — Medication 650 MILLIGRAM(S): at 13:00

## 2023-07-22 RX ADMIN — MYCOPHENOLATE MOFETIL 1 GRAM(S): 250 CAPSULE ORAL at 08:42

## 2023-07-22 NOTE — PROGRESS NOTE ADULT - ASSESSMENT
53 yr old man with ESRD on HD for 6 years via left AVF. Nephrologist Dr. Bharath Romo.   PMH: DM2, CAD, CHF, s/p CABG 2015, AICD (2016),  PVD, is s/p 4 stents in R leg (mid and distal right superficial femoral artery, right popliteal x2 on 1/14/22).; is sp RT big toe and second toe amputation 2021, on asa and plavix for severe PVD.     S/p DDRT on 7/14/23 on the left side.   Donor 59 yrs old, KDPI 83%, Terminal Cr 0.8. Donor CMV +, EBV +. Single vessels and ureter stented. CIT 16 hours. Required 2 units PRBC intraop. Transferred to SICU post op due to hypotension requiring vasopressors.     HD done yesterday, tolerated 2 liters UF.  Still oliguric. Edema improving   Started heparin drip for thrombus in left subclavian. Planned for thrombectomy and fistuloplasty on Monday 7/24/23 by vascular     1. 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  Follow up Tx US done yesterday unremarkable.   S/p HD yesterday. Tolerated UF 2Liters.   Voiding more urine. No need for dialysis today. Will hold off on dialysis and give IV lasix.   Continue sevelamer 1600mg po TID for hyperphosphatemia     2. Immunosuppression   Initially received Simulect induction - Changed to Thymoglobulin given delayed graft function. Dose #4 today. CBC reviewed, has anemia and thrombocytopenia but not severe. Will give full dose.   Steroid taper per protocol  MMF 1 gram po BID   Envarsus 6mg daily, trough goal 8-10     3. Infection prophylaxis   Nystatin, Bactrim, Valcyte renal dose     4. LUE swelling US with left subclavian vein stenosis and thrombus. Continue heparin drip -conservative PTT target. Planned for thrombectomy and fistulogram/fistulopasty on Monday     5. CAD s/p CABG/PVD - Continue Aspirin. Holding plavix while on anticoagulation      Coreg to 3.125mg po bid due to low BP.     6. DM2- Lantus 20U and admelog 8 units pre meals .    7. Anemia - multifactorial, post op, CKD. Continue epo 10,000 units sq weekly . 53 yr old man with ESRD on HD for 6 years via left AVF. Nephrologist Dr. Bharath Romo.   PMH: DM2, CAD, CHF, s/p CABG 2015, AICD (2016),  PVD, is s/p 4 stents in R leg (mid and distal right superficial femoral artery, right popliteal x2 on 1/14/22).; is sp RT big toe and second toe amputation 2021, on asa and plavix for severe PVD.     S/p DDRT on 7/14/23 on the left side.   Donor 59 yrs old, KDPI 83%, Terminal Cr 0.8. Donor CMV +, EBV +. Single vessels and ureter stented. CIT 16 hours. Required 2 units PRBC intraop. Transferred to SICU post op due to hypotension requiring vasopressors.     HD done yesterday, tolerated 2 liters UF.  Still oliguric but UOP improving. Edema improving   Started heparin drip for thrombus in left subclavian. Planned for thrombectomy and fistuloplasty on Monday 7/24/23 by vascular     1. 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  Follow up Tx US done yesterday unremarkable.   S/p HD yesterday. Tolerated UF 2Liters.   Voiding more urine. No need for dialysis today. Will hold off on dialysis and give IV lasix.   Continue sevelamer 1600mg po TID for hyperphosphatemia     2. Immunosuppression   Initially received Simulect induction - Changed to Thymoglobulin given delayed graft function. Dose #4 today. CBC reviewed, has anemia and thrombocytopenia but not severe. Will give full dose.   Steroid taper per protocol  MMF 1 gram po BID   Envarsus 6mg daily, trough goal 8-10     3. Infection prophylaxis   Nystatin, Bactrim, Valcyte renal dose     4. LUE swelling US with left subclavian vein stenosis and thrombus. Continue heparin drip -conservative PTT target. Planned for thrombectomy and fistulogram/fistulopasty on Monday     5. CAD s/p CABG/PVD - Continue Aspirin. Holding plavix while on anticoagulation      Coreg to 3.125mg po bid due to low BP.     6. DM2- Lantus 20U and admelog 8 units pre meals .    7. Anemia - multifactorial, post op, CKD. Continue epo 10,000 units sq weekly .

## 2023-07-22 NOTE — PROGRESS NOTE ADULT - SUBJECTIVE AND OBJECTIVE BOX
[facility] DIVISION OF KIDNEY DISEASES AND HYPERTENSION -- FOLLOW UP NOTE  --------------------------------------------------------------------------------  Authored by: Nba Aguilar   Cell # [phone]     ROSA CONDE was seen and examined at bedside.   Patient is a 53y old  Male who presents with a chief complaint of Possible DDRT (07-22-23)      24 hour events/subjective: Voiding more, edema improved with daily dialysis sessions, feels better overall. Had BM.   Tolerating diet, ambulating.       Standing Inpatient Medications  acetaminophen     Tablet .. 650 milliGRAM(s) Oral once  antithymocyte globulin rabbit (peripheral) IVPB w/additives 150 milliGRAM(s) IV Intermittent once  aspirin  chewable 81 milliGRAM(s) Oral daily  atorvastatin 40 milliGRAM(s) Oral at bedtime  chlorhexidine 2% Cloths 1 Application(s) Topical daily  chlorhexidine 4% Liquid 1 Application(s) Topical <User Schedule>  diphenhydrAMINE 50 milliGRAM(s) Oral once  famotidine    Tablet 20 milliGRAM(s) Oral daily  heparin  Infusion 11 Unit(s)/Hr IV Continuous <Continuous>  insulin glargine Injectable (LANTUS) 20 Unit(s) SubCutaneous at bedtime  insulin lispro (ADMELOG) corrective regimen sliding scale   SubCutaneous Before meals and at bedtime  insulin lispro Injectable (ADMELOG) 8 Unit(s) SubCutaneous three times a day before meals  methylPREDNISolone sodium succinate Injectable 30 milliGRAM(s) IV Push once  mycophenolate mofetil 1 Gram(s) Oral <User Schedule>  nystatin    Suspension 813158 Unit(s) Swish and Swallow four times a day  polyethylene glycol 3350 17 Gram(s) Oral two times a day  senna 2 Tablet(s) Oral at bedtime  sevelamer carbonate 1600 milliGRAM(s) Oral three times a day with meals  tacrolimus ER Tablet (ENVARSUS XR) 6 milliGRAM(s) Oral <User Schedule>  trimethoprim   80 mG/sulfamethoxazole 400 mG 1 Tablet(s) Oral daily  valGANciclovir 450 milliGRAM(s) Oral <User Schedule>    PRN Inpatient Medications  sodium chloride 0.9% lock flush 10 milliLiter(s) IV Push every 1 hour PRN  traMADol 25 milliGRAM(s) Oral every 6 hours PRN  traMADol 50 milliGRAM(s) Oral every 6 hours PRN        VITALS/PHYSICAL EXAM  --------------------------------------------------------------------------------  T(C): 36.4 (07-22-23 @ 12:00), Max: 36.9 (07-21-23 @ 21:12)  HR: 83 (07-22-23 @ 12:00) (81 - 100)  BP: 133/67 (07-22-23 @ 12:00) (106/41 - 150/64)  RR: 18 (07-22-23 @ 12:00) (16 - 20)  SpO2: 100% (07-22-23 @ 12:00) (95% - 100%)        07-21-23 @ 07:01  -  07-22-23 @ 07:00  --------------------------------------------------------  IN: 2573.1 mL / OUT: 2135 mL / NET: 438.1 mL    07-22-23 @ 07:01  -  07-22-23 @ 12:16  --------------------------------------------------------  IN: 283 mL / OUT: 265 mL / NET: 18 mL      Physical Exam:  Awake and alert  Comfortably sitting in chair  Lungs clear   LUE edema+, AVF + thrill   No LE edema  Abdomen soft, LLQ wound + staples, one area slightly open, dressing stained with serosanguinous fluid, LIANA+   Alert and oriented     LABS/STUDIES  --------------------------------------------------------------------------------              8.6    3.62  >-----------<  60       [07-22-23 @ 06:56]              26.9     136  |  93  |  50  ----------------------------<  205      [07-22-23 @ 06:56]  3.6   |  28  |  4.45        Ca     9.1     [07-22-23 @ 06:56]      Mg     2.3     [07-22-23 @ 06:56]      Phos  5.0     [07-22-23 @ 06:56]    PTT: 25.2       [07-22-23 @ 06:55]      Creatinine Trend:  SCr 4.45 [07-22 @ 06:56]  SCr 4.88 [07-21 @ 05:26]  SCr 5.24 [07-20 @ 06:35]  SCr 7.32 [07-19 @ 07:03]  SCr 6.80 [07-19 @ 00:09]    Tacrolimus (), Serum: 7.1 ng/mL (07-22 @ 06:56)  Tacrolimus (), Serum: 9.2 ng/mL (07-21 @ 05:26)  Tacrolimus (), Serum: 9.1 ng/mL (07-20 @ 06:35)  Tacrolimus (), Serum: 14.9 ng/mL (07-19 @ 07:06)    CAPILLARY BLOOD GLUCOSE  POCT Blood Glucose.: 221 mg/dL (22 Jul 2023 08:30)  POCT Blood Glucose.: 229 mg/dL (21 Jul 2023 21:11)  POCT Blood Glucose.: 188 mg/dL (21 Jul 2023 17:25)      Urinalysis - [07-22-23 @ 06:56]      Color  / Appearance  / SG  / pH       Gluc 205 / Ketone   / Bili  / Urobili        Blood  / Protein  / Leuk Est  / Nitrite       RBC  / WBC  / Hyaline  / Gran  / Sq Epi  / Non Sq Epi  / Bacteria     HBsAb 5.7      [07-15-23 @ 21:22]  HBsAg Nonreact      [07-15-23 @ 21:21]  HBcAb Nonreact      [07-15-23 @ 21:22]  HCV 0.16, Nonreact      [07-15-23 @ 21:22]  HIV Nonreact      [07-15-23 @ 21:21]

## 2023-07-22 NOTE — PROGRESS NOTE ADULT - SUBJECTIVE AND OBJECTIVE BOX
Transplant Surgery - Multidisciplinary Progress Note  --------------------------------------------------------------  DDRT 7/16/23 POD#6    Pt seen and examined by the multidisciplinary team: Surgery: Dr. Muñoz and Nephrology: Dr. Aguilar and NP Devon/Aries, and unit RN. Rest of disciplines not in attendance to be notified of plan    54 yo M with hx of DM2 CAD, CHF, s/p CABG 2015, AICD (2016), on hemodialysis for approximately 6 years via L AVF (nephrologist Dr. Bharath Romo), He has hx of PVD, is s/p 4 stents in R leg (mid and distal right superficial femoral artery, right popliteal x2 on 1/14/22).; is sp RT big toe and second toe amputation 2021, on asa and plavix for severe PVD (per pt).   Now here for DDRT.     Underwent L DDRT 7/15/23 Simulect and solumedrol for induction.   CIT 16hrs, 1A,1V,1U.   Started on insulin drip intraoperatively for hyperkalemia after reperfusion.  Required pressors (Levo - 0.04) intraoperatively and received 2U PRBC.       Interval events:   -S/P HD yesterday  -Hep gtt low ptt goal 40-60  -BG elevated insulin adjusted  - Repeat US patent  - Pt agitated overnight       MEDICATIONS  (STANDING):  aspirin  chewable 81 milliGRAM(s) Oral daily  atorvastatin 40 milliGRAM(s) Oral at bedtime  chlorhexidine 4% Liquid 1 Application(s) Topical <User Schedule>  famotidine    Tablet 20 milliGRAM(s) Oral daily  heparin  Infusion 800 Unit(s)/Hr (8 mL/Hr) IV Continuous <Continuous>  insulin glargine Injectable (LANTUS) 20 Unit(s) SubCutaneous at bedtime  insulin lispro (ADMELOG) corrective regimen sliding scale   SubCutaneous Before meals and at bedtime  insulin lispro Injectable (ADMELOG) 8 Unit(s) SubCutaneous three times a day before meals  mycophenolate mofetil 1 Gram(s) Oral <User Schedule>  nystatin    Suspension 686871 Unit(s) Swish and Swallow four times a day  polyethylene glycol 3350 17 Gram(s) Oral two times a day  senna 2 Tablet(s) Oral at bedtime  sevelamer carbonate 1600 milliGRAM(s) Oral three times a day with meals  tacrolimus ER Tablet (ENVARSUS XR) 6 milliGRAM(s) Oral <User Schedule>  trimethoprim   80 mG/sulfamethoxazole 400 mG 1 Tablet(s) Oral daily  valGANciclovir 450 milliGRAM(s) Oral <User Schedule>    MEDICATIONS  (PRN):  sodium chloride 0.9% lock flush 10 milliLiter(s) IV Push every 1 hour PRN Pre/post blood products, medications, blood draw, and to maintain line patency  traMADol 25 milliGRAM(s) Oral every 6 hours PRN Moderate Pain (4 - 6)  traMADol 50 milliGRAM(s) Oral every 6 hours PRN Severe Pain (7 - 10)      PAST MEDICAL & SURGICAL HISTORY:  Diabetes mellitus  type 2      Foot ulcer due to secondary DM      Coronary artery disease      Acute on chronic systolic heart failure      Breast Reduction  at age 18      Toe amputation status, left      S/P CABG (coronary artery bypass graft)      AICD (automatic cardioverter/defibrillator) present          Vital Signs Last 24 Hrs  T(C): 36.7 (21 Jul 2023 04:53), Max: 36.9 (20 Jul 2023 12:49)  T(F): 98 (21 Jul 2023 04:53), Max: 98.4 (20 Jul 2023 12:49)  HR: 81 (21 Jul 2023 04:53) (74 - 97)  BP: 150/81 (21 Jul 2023 04:53) (103/57 - 151/67)  BP(mean): 96 (21 Jul 2023 01:30) (75 - 97)  RR: 18 (21 Jul 2023 04:53) (16 - 20)  SpO2: 98% (21 Jul 2023 04:53) (95% - 100%)    Parameters below as of 21 Jul 2023 04:53  Patient On (Oxygen Delivery Method): room air        I&O's Summary    20 Jul 2023 07:01  -  21 Jul 2023 07:00  --------------------------------------------------------  IN: 2209.3 mL / OUT: 2202.5 mL / NET: 6.8 mL    21 Jul 2023 07:01  -  21 Jul 2023 10:22  --------------------------------------------------------  IN: 256 mL / OUT: 30 mL / NET: 226 mL                              8.6    5.03  )-----------( 79       ( 21 Jul 2023 05:26 )             27.1     07-21    139  |  97  |  52<H>  ----------------------------<  290<H>  4.0   |  27  |  4.88<H>    Ca    9.4      21 Jul 2023 05:26  Phos  6.4     07-21  Mg     2.4     07-21      Tacrolimus (), Serum: 9.2 ng/mL (07-21 @ 05:26)    PHYSICAL EXAM:  GENERAL: NAD, well-groomed, well-developed  HEAD:  Atraumatic, Normocephalic  EYES: EOMI, PERRLA, conjunctiva and sclera clear  NECK: Supple, No JVD, L IJ TLC CDI  CHEST/LUNG: non-labored breathing on room air.   HEART: Regular rate and rhythm; S1/S2  ABDOMEN: Soft, Non-distended, LLQ incision with staples in place healing well. Miles drain with bulb suction SS output, no signs of infection.  : no granda, no uop  VASCULAR: Normal pulses, Normal capillary refill  EXTREMITIES:  2+ Peripheral Pulses, No cyanosis, L hand edema, LE edema improving b/l. AVF with + thrill.   SKIN: Warm, Intact

## 2023-07-22 NOTE — PROGRESS NOTE ADULT - ASSESSMENT
Patient is a 53y Male  with hx of DM2 CAD, CHF, s/p CABG 2015, AICD (2016), on hemodialysis for approximately 6 years via L AVF (nephrologist Dr. Bharath Romo), He has hx of PVD, is s/p 4 stents in R leg (mid and distal right superficial femoral artery, right popliteal x2 on 1/14/22).  s/p  RT big toe and second toe amputation 2021, on asa and plavix for severe PVD (per pt).   Patient is s/p DDRT 7/16, requiring levophed drip and insulin drip post op with hyperkalemia, transferred to SICU         CVS: Post op hypotension  improved  on regular floor    - diet as tolerated  - Bowel regimen     Renal: s/p DDRT for ESRD 2/2 DM  -has left arm fistula,   hyperkalemia better  hd as per renal  - care per renal transplant regarding: tc / meds  - Monitor I&Os and electrolytes  - replete electrolytes as needed   hd as per renal /no hd today      Heme: stable h/h  - Monitor CBC and coags  -  VTE prophylaxis: hold  -SCD's        Endo: hx of DM,   - Hyperglycemia requring insulin drip d/c d    now fsg riss    steroid taper \  for l subclavian procedure on monday     -oob

## 2023-07-22 NOTE — PROGRESS NOTE ADULT - SUBJECTIVE AND OBJECTIVE BOX
Subjective: Patient seen and examined. No new events except as noted.     SUBJECTIVE/ROS:  No chest pain, dyspnea, palpitation, or dizziness.       MEDICATIONS:  MEDICATIONS  (STANDING):  acetaminophen     Tablet .. 650 milliGRAM(s) Oral once  antithymocyte globulin rabbit (peripheral) IVPB w/additives 150 milliGRAM(s) IV Intermittent once  aspirin  chewable 81 milliGRAM(s) Oral daily  atorvastatin 40 milliGRAM(s) Oral at bedtime  chlorhexidine 2% Cloths 1 Application(s) Topical daily  chlorhexidine 4% Liquid 1 Application(s) Topical <User Schedule>  diphenhydrAMINE 50 milliGRAM(s) Oral once  famotidine    Tablet 20 milliGRAM(s) Oral daily  furosemide   Injectable 80 milliGRAM(s) IV Push <User Schedule>  heparin  Infusion 11 Unit(s)/Hr (11 mL/Hr) IV Continuous <Continuous>  insulin glargine Injectable (LANTUS) 20 Unit(s) SubCutaneous at bedtime  insulin lispro (ADMELOG) corrective regimen sliding scale   SubCutaneous Before meals and at bedtime  insulin lispro Injectable (ADMELOG) 8 Unit(s) SubCutaneous three times a day before meals  methylPREDNISolone sodium succinate Injectable 30 milliGRAM(s) IV Push once  mycophenolate mofetil 1 Gram(s) Oral <User Schedule>  nystatin    Suspension 316419 Unit(s) Swish and Swallow four times a day  polyethylene glycol 3350 17 Gram(s) Oral two times a day  senna 2 Tablet(s) Oral at bedtime  sevelamer carbonate 1600 milliGRAM(s) Oral three times a day with meals  tacrolimus ER Tablet (ENVARSUS XR) 6 milliGRAM(s) Oral <User Schedule>  trimethoprim   80 mG/sulfamethoxazole 400 mG 1 Tablet(s) Oral daily  valGANciclovir 450 milliGRAM(s) Oral <User Schedule>      PHYSICAL EXAM:  T(C): 36.4 (07-22-23 @ 12:00), Max: 36.9 (07-21-23 @ 21:12)  HR: 83 (07-22-23 @ 12:00) (81 - 100)  BP: 133/67 (07-22-23 @ 12:00) (106/41 - 150/64)  RR: 18 (07-22-23 @ 12:00) (16 - 20)  SpO2: 100% (07-22-23 @ 12:00) (95% - 100%)  Wt(kg): --  I&O's Summary    21 Jul 2023 07:01  -  22 Jul 2023 07:00  --------------------------------------------------------  IN: 2573.1 mL / OUT: 2135 mL / NET: 438.1 mL    22 Jul 2023 07:01  -  22 Jul 2023 12:12  --------------------------------------------------------  IN: 283 mL / OUT: 265 mL / NET: 18 mL            JVP: Normal  Neck: supple  Lung: clear   CV: S1 S2 , Murmur:  Abd: soft  Ext: No edema  neuro: Awake / alert  Psych: flat affect  Skin: normal``    LABS/DATA:    CARDIAC MARKERS:                                8.6    3.62  )-----------( 60       ( 22 Jul 2023 06:56 )             26.9     07-22    136  |  93<L>  |  50<H>  ----------------------------<  205<H>  3.6   |  28  |  4.45<H>    Ca    9.1      22 Jul 2023 06:56  Phos  5.0     07-22  Mg     2.3     07-22      proBNP:   Lipid Profile:   HgA1c:   TSH:     TELE:  EKG:

## 2023-07-22 NOTE — PROGRESS NOTE ADULT - ASSESSMENT
54 yo M with hx of DM2 CAD, CHF, s/p CABG 2015, AICD (2016), on hemodialysis for approximately 6 years via L AVF (nephrologist Dr. Bharath Romo), He has hx of PVD, is s/p 4 stents in R leg (mid and distal right superficial femoral artery, right popliteal x2 on 1/14/22).; is sp RT big toe and second toe amputation 2021, on asa and plavix for severe PVD (per pt). Now here for possible DDRT. Pt reports does not make urine only few drops occasionally. Pt denies N/V/D, fevers/chills, CP, SOB. Pt reports last ate at 4pm today and had HD yesterday 7/14/23.  Nephrology consulted for ESRD s/p DDRT.      ESRD on HD [facility]  Nephrologist : Dr. Thomas / Dr. Brooke at [facility] [place]   s/p DDRT 7/16   s/p HD 7/19, 7/20, 7/21   HD per transplant team   HD consent in chart.   Renal diet   Monitor     s/p DDRT @ [facility] 7/16/2023   Underwent L DDRT 7/13/2023 s/p Simulect and solumedrol for induction.   Started on insulin drip intraoperatively for hyperkalemia after reperfusion and transferred to SICU   Monitor BMP, urine output   Pt anuric at present, likely will require HD as present   Repeat graft US with patent vessels  Management per Transplant  LUE swelling US with left sub clavian vein stenosis and thrombosis. Vascular following & recommending CTV and started on heparin gtt.   Continue immunosuppressants per transplant : Envarsus 6 mg qd, Cellcept 1 gm BID, and prednisone taper.   Initially received Simulect induction - Changed to Thymoglobulin given delayed graft function. Dose #2 today   Valcyte to every other day (MWF) Nystatin, Bactrim renal dose  FK level 9.2 on 7/21  Check tacrolimus trough 30 minutes prior to AM dose  Monitor urine output, creatinine, LIANA.       Hyperkalemia   in setting of ESRD and recent DDRT with sub-optimally functioning allograft.   s/p HD 7/19 and 7/20 , 7/21  Low K diet   Mgmt per Transplant  Monitor     HTN:   BP fluctuates   Coreg reduced to 3.125mg po bid due to low BP.   Monitor     Hyperphosphatemia  Low PO4 diet  Phoslo switched to sevelamer 1600 mg tid -- continue at present   Monitor PO4 daily

## 2023-07-22 NOTE — PROGRESS NOTE ADULT - ASSESSMENT
CAD s/p cabg  stable   asa, statin    chronic systolic chf  stable  improving LV function   resume coreg once deemed safe as moi primary team   off ace/arb/arni for now   s/p ICD  interrogation noted     ESRD  s/p transplant   fu with transplant team     subclavian thrombosis  on a/c

## 2023-07-22 NOTE — PROGRESS NOTE ADULT - SUBJECTIVE AND OBJECTIVE BOX
[facility]   MD DANIEL WALDEN MD RUORU WONG, PA    TEL:  FROM 9 AM to 5 PM ---OFFICE: [phone]    FROM 5 PM - 9 AM PLEASE CALL ANSWERING SERVICE: [phone]    RENAL FOLLOW UP NOTE--Date of Service 07-22-23 @ 08:17  --------------------------------------------------------------------------------  HPI:  Pt seen and examined at bedside.       PAST HISTORY  --------------------------------------------------------------------------------  No significant changes to PMH, PSH, FHx, SHx, unless otherwise noted    ALLERGIES & MEDICATIONS  --------------------------------------------------------------------------------  Allergies    Contrast. (Unknown)    Intolerances      Standing Inpatient Medications  aspirin  chewable 81 milliGRAM(s) Oral daily  atorvastatin 40 milliGRAM(s) Oral at bedtime  chlorhexidine 4% Liquid 1 Application(s) Topical <User Schedule>  famotidine    Tablet 20 milliGRAM(s) Oral daily  heparin  Infusion 11 Unit(s)/Hr IV Continuous <Continuous>  insulin glargine Injectable (LANTUS) 20 Unit(s) SubCutaneous at bedtime  insulin lispro (ADMELOG) corrective regimen sliding scale   SubCutaneous Before meals and at bedtime  insulin lispro Injectable (ADMELOG) 8 Unit(s) SubCutaneous three times a day before meals  mycophenolate mofetil 1 Gram(s) Oral <User Schedule>  nystatin    Suspension 391456 Unit(s) Swish and Swallow four times a day  polyethylene glycol 3350 17 Gram(s) Oral two times a day  senna 2 Tablet(s) Oral at bedtime  sevelamer carbonate 1600 milliGRAM(s) Oral three times a day with meals  tacrolimus ER Tablet (ENVARSUS XR) 6 milliGRAM(s) Oral <User Schedule>  trimethoprim   80 mG/sulfamethoxazole 400 mG 1 Tablet(s) Oral daily  valGANciclovir 450 milliGRAM(s) Oral <User Schedule>    PRN Inpatient Medications  sodium chloride 0.9% lock flush 10 milliLiter(s) IV Push every 1 hour PRN  traMADol 25 milliGRAM(s) Oral every 6 hours PRN  traMADol 50 milliGRAM(s) Oral every 6 hours PRN      REVIEW OF SYSTEMS  --------------------------------------------------------------------------------  General: no fever  MSK: no edema     VITALS/PHYSICAL EXAM  --------------------------------------------------------------------------------  T(C): 36.8 (07-22-23 @ 05:45), Max: 36.9 (07-21-23 @ 21:12)  HR: 82 (07-22-23 @ 05:45) (81 - 100)  BP: 137/72 (07-22-23 @ 05:45) (106/41 - 150/64)  RR: 18 (07-22-23 @ 05:45) (16 - 20)  SpO2: 97% (07-22-23 @ 05:45) (95% - 100%)  Wt(kg): --        07-21-23 @ 07:01  -  07-22-23 @ 07:00  --------------------------------------------------------  IN: 2573.1 mL / OUT: 2135 mL / NET: 438.1 mL      Physical Exam:  	Gen: NAD  	HEENT: MMM  	Pulm: CTA B/L  	CV: S1S2  	Abd: Soft, +BS  	Ext: No LE edema B/L                      Neuro: Awake   	Skin: Warm and Dry   	Vascular access: NO HD catheter            no  kalee  LABS/STUDIES  --------------------------------------------------------------------------------              8.6    3.62  >-----------<  60       [07-22-23 @ 06:56]              26.9     136  |  93  |  50  ----------------------------<  205      [07-22-23 @ 06:56]  3.6   |  28  |  4.45        Ca     9.1     [07-22-23 @ 06:56]      Mg     2.3     [07-22-23 @ 06:56]      Phos  5.0     [07-22-23 @ 06:56]        PTT: 25.2       [07-22-23 @ 06:55]      Creatinine Trend:  SCr 4.45 [07-22 @ 06:56]  SCr 4.88 [07-21 @ 05:26]  SCr 5.24 [07-20 @ 06:35]  SCr 7.32 [07-19 @ 07:03]  SCr 6.80 [07-19 @ 00:09]    Urinalysis - [07-22-23 @ 06:56]      Color  / Appearance  / SG  / pH       Gluc 205 / Ketone   / Bili  / Urobili        Blood  / Protein  / Leuk Est  / Nitrite       RBC  / WBC  / Hyaline  / Gran  / Sq Epi  / Non Sq Epi  / Bacteria         HBsAb 5.7      [07-15-23 @ 21:22]  HBsAg Nonreact      [07-15-23 @ 21:21]  HBcAb Nonreact      [07-15-23 @ 21:22]  HCV 0.16, Nonreact      [07-15-23 @ 21:22]  HIV Nonreact      [07-15-23 @ 21:21]

## 2023-07-22 NOTE — PROGRESS NOTE ADULT - SUBJECTIVE AND OBJECTIVE BOX
DATE OF SERVICE: 07-22-23 @ 16:15  CHIEF COMPLAINT:Patient is a 53y old  Male who presents with a chief complaint of Possible DDRT (22 Jul 2023 13:36)    	        PAST MEDICAL & SURGICAL HISTORY:  Diabetes mellitus  type 2      Foot ulcer due to secondary DM      Coronary artery disease      Acute on chronic systolic heart failure      Breast Reduction  at age 18      Toe amputation status, left      S/P CABG (coronary artery bypass graft)      AICD (automatic cardioverter/defibrillator) present              REV  NECK: No pain or stiffness  RESPIRATORY: No cough, wheezing, chills or hemoptysis; No Shortness of Breath  CARDIOVASCULAR: No chest pain, palpitations, passing out,   GASTROINTESTINAL: No abdominal or epigastric pain. No nausea, vomiting, or hematemesis;   GENITOURINARY: No dysuria, frequency, hematuria,   NEUROLOGICAL: No headaches, memory loss, loss of strength, numbness, or tremors      Medications:  MEDICATIONS  (STANDING):  aspirin  chewable 81 milliGRAM(s) Oral daily  atorvastatin 40 milliGRAM(s) Oral at bedtime  chlorhexidine 2% Cloths 1 Application(s) Topical daily  chlorhexidine 4% Liquid 1 Application(s) Topical <User Schedule>  famotidine    Tablet 20 milliGRAM(s) Oral daily  furosemide   Injectable 80 milliGRAM(s) IV Push <User Schedule>  heparin  Infusion 11 Unit(s)/Hr (11 mL/Hr) IV Continuous <Continuous>  insulin glargine Injectable (LANTUS) 20 Unit(s) SubCutaneous at bedtime  insulin lispro (ADMELOG) corrective regimen sliding scale   SubCutaneous Before meals and at bedtime  insulin lispro Injectable (ADMELOG) 8 Unit(s) SubCutaneous three times a day before meals  mycophenolate mofetil 1 Gram(s) Oral <User Schedule>  nystatin    Suspension 504547 Unit(s) Swish and Swallow four times a day  polyethylene glycol 3350 17 Gram(s) Oral two times a day  senna 2 Tablet(s) Oral at bedtime  sevelamer carbonate 1600 milliGRAM(s) Oral three times a day with meals  tacrolimus ER Tablet (ENVARSUS XR) 6 milliGRAM(s) Oral <User Schedule>  trimethoprim   80 mG/sulfamethoxazole 400 mG 1 Tablet(s) Oral daily  valGANciclovir 450 milliGRAM(s) Oral <User Schedule>    MEDICATIONS  (PRN):  sodium chloride 0.9% lock flush 10 milliLiter(s) IV Push every 1 hour PRN Pre/post blood products, medications, blood draw, and to maintain line patency  traMADol 25 milliGRAM(s) Oral every 6 hours PRN Moderate Pain (4 - 6)  traMADol 50 milliGRAM(s) Oral every 6 hours PRN Severe Pain (7 - 10)    	    PHYSICAL EXAM:  T(C): 36.4 (07-22-23 @ 16:00), Max: 36.9 (07-21-23 @ 21:12)  HR: 83 (07-22-23 @ 16:00) (81 - 90)  BP: 142/67 (07-22-23 @ 16:00) (112/53 - 144/79)  RR: 17 (07-22-23 @ 16:00) (16 - 18)  SpO2: 100% (07-22-23 @ 16:00) (95% - 100%)  Wt(kg): --  I&O's Summary    21 Jul 2023 07:01  -  22 Jul 2023 07:00  --------------------------------------------------------  IN: 2573.1 mL / OUT: 2135 mL / NET: 438.1 mL    22 Jul 2023 07:01  -  22 Jul 2023 16:15  --------------------------------------------------------  IN: 493.2 mL / OUT: 280 mL / NET: 213.2 mL        Appearance: Normal	  HEENT:   Normal oral mucosa,  Cardiovascular: Normal S1 S2, No JVD,  Respiratory: Lungs clear to auscultation	  Psychiatry: A & O x 3,  Gastrointestinal:  Soft, Non-tender, + BS	  Skin: No rashes, No ecchymoses, No cyanosis	  Neurologic: Non-focal  Extremities: dec edema    TELEMETRY: 	    ECG:  	  RADIOLOGY:  OTHER: 	  	  LABS:	 	    CARDIAC MARKERS:                                8.6    4.64  )-----------( 65       ( 22 Jul 2023 15:00 )             26.8     07-22    136  |  93<L>  |  50<H>  ----------------------------<  205<H>  3.6   |  28  |  4.45<H>    Ca    9.1      22 Jul 2023 06:56  Phos  5.0     07-22  Mg     2.3     07-22      proBNP:   Lipid Profile:   HgA1c:   TSH:

## 2023-07-22 NOTE — PROGRESS NOTE ADULT - ASSESSMENT
54 yo M with hx of DM2 CAD, CHF, s/p CABG 2015, AICD (2016), on hemodialysis for approximately 6 years via L AVF (nephrologist Dr. Bharath Romo), He has hx of PVD, is s/p 4 stents in R leg (mid and distal right superficial femoral artery, right popliteal x2 on 1/14/22).; is sp RT big toe and second toe amputation 2021, on asa and plavix for severe PVD (per pt).   Now here for DDRT.      Plan:  POD#6- L DDRT  DGF---> Last HD 7/21/23  Lasix 80mg IV BID x 2 doses today will hold off HD for today per renal  Continue heparin drip per vascular plan for fistula gram on Monday  Monitor urine output, creatinine, LIANA.   Psych consult for agitation  Restarted on home aspirin. Holding plavix    Continue diabetic diet.    Immunosuppression:   - Switched from Simulect to Thymoglobulin.   - MMF, Methylpred taper, Env by level  - Valcyte to every other day (MWF)     DM:   - Monitor FS, patient now on ISS  - Started pre meal and Lantus, will adjust to goal     PAD:   - Aspirin restarted  - Plavix held     LUE swelling.   - LUE US : prelim with SC stenosis vs thrombus (known per patient)   - Vascular evaluation of AV,  started on heparin gtt ptt goal 40-60  - vascular planning Monday fistulogram

## 2023-07-23 ENCOUNTER — TRANSCRIPTION ENCOUNTER (OUTPATIENT)
Age: 53
End: 2023-07-23

## 2023-07-23 LAB
ANION GAP SERPL CALC-SCNC: 15 MMOL/L — SIGNIFICANT CHANGE UP (ref 5–17)
APTT BLD: 30.9 SEC — SIGNIFICANT CHANGE UP (ref 27.5–35.5)
APTT BLD: 34.5 SEC — SIGNIFICANT CHANGE UP (ref 27.5–35.5)
BUN SERPL-MCNC: 72 MG/DL — HIGH (ref 7–23)
CALCIUM SERPL-MCNC: 9.3 MG/DL — SIGNIFICANT CHANGE UP (ref 8.4–10.5)
CHLORIDE SERPL-SCNC: 93 MMOL/L — LOW (ref 96–108)
CO2 SERPL-SCNC: 26 MMOL/L — SIGNIFICANT CHANGE UP (ref 22–31)
CREAT SERPL-MCNC: 5.58 MG/DL — HIGH (ref 0.5–1.3)
EGFR: 12 ML/MIN/1.73M2 — LOW
GLUCOSE BLDC GLUCOMTR-MCNC: 142 MG/DL — HIGH (ref 70–99)
GLUCOSE BLDC GLUCOMTR-MCNC: 164 MG/DL — HIGH (ref 70–99)
GLUCOSE BLDC GLUCOMTR-MCNC: 186 MG/DL — HIGH (ref 70–99)
GLUCOSE BLDC GLUCOMTR-MCNC: 242 MG/DL — HIGH (ref 70–99)
GLUCOSE SERPL-MCNC: 229 MG/DL — HIGH (ref 70–99)
HCT VFR BLD CALC: 24.7 % — LOW (ref 39–50)
HGB BLD-MCNC: 8.1 G/DL — LOW (ref 13–17)
MAGNESIUM SERPL-MCNC: 2.4 MG/DL — SIGNIFICANT CHANGE UP (ref 1.6–2.6)
MCHC RBC-ENTMCNC: 30.6 PG — SIGNIFICANT CHANGE UP (ref 27–34)
MCHC RBC-ENTMCNC: 32.8 GM/DL — SIGNIFICANT CHANGE UP (ref 32–36)
MCV RBC AUTO: 93.2 FL — SIGNIFICANT CHANGE UP (ref 80–100)
NRBC # BLD: 0 /100 WBCS — SIGNIFICANT CHANGE UP (ref 0–0)
PHOSPHATE SERPL-MCNC: 5.6 MG/DL — HIGH (ref 2.5–4.5)
PLATELET # BLD AUTO: 57 K/UL — LOW (ref 150–400)
POTASSIUM SERPL-MCNC: 3.9 MMOL/L — SIGNIFICANT CHANGE UP (ref 3.5–5.3)
POTASSIUM SERPL-SCNC: 3.9 MMOL/L — SIGNIFICANT CHANGE UP (ref 3.5–5.3)
RBC # BLD: 2.65 M/UL — LOW (ref 4.2–5.8)
RBC # FLD: 16.7 % — HIGH (ref 10.3–14.5)
SODIUM SERPL-SCNC: 134 MMOL/L — LOW (ref 135–145)
TACROLIMUS SERPL-MCNC: 8.3 NG/ML — SIGNIFICANT CHANGE UP
WBC # BLD: 2.45 K/UL — LOW (ref 3.8–10.5)
WBC # FLD AUTO: 2.45 K/UL — LOW (ref 3.8–10.5)

## 2023-07-23 PROCEDURE — 99233 SBSQ HOSP IP/OBS HIGH 50: CPT

## 2023-07-23 PROCEDURE — 99232 SBSQ HOSP IP/OBS MODERATE 35: CPT | Mod: GC,24

## 2023-07-23 RX ORDER — FUROSEMIDE 40 MG
80 TABLET ORAL ONCE
Refills: 0 | Status: COMPLETED | OUTPATIENT
Start: 2023-07-23 | End: 2023-07-23

## 2023-07-23 RX ORDER — DIPHENHYDRAMINE HCL 50 MG
50 CAPSULE ORAL ONCE
Refills: 0 | Status: COMPLETED | OUTPATIENT
Start: 2023-07-24 | End: 2023-07-24

## 2023-07-23 RX ORDER — SODIUM CHLORIDE 9 MG/ML
1000 INJECTION INTRAMUSCULAR; INTRAVENOUS; SUBCUTANEOUS
Refills: 0 | Status: DISCONTINUED | OUTPATIENT
Start: 2023-07-23 | End: 2023-07-24

## 2023-07-23 RX ORDER — INSULIN GLARGINE 100 [IU]/ML
10 INJECTION, SOLUTION SUBCUTANEOUS AT BEDTIME
Refills: 0 | Status: DISCONTINUED | OUTPATIENT
Start: 2023-07-23 | End: 2023-07-24

## 2023-07-23 RX ORDER — HEPARIN SODIUM 5000 [USP'U]/ML
1300 INJECTION INTRAVENOUS; SUBCUTANEOUS
Qty: 25000 | Refills: 0 | Status: DISCONTINUED | OUTPATIENT
Start: 2023-07-23 | End: 2023-07-24

## 2023-07-23 RX ORDER — HEPARIN SODIUM 5000 [USP'U]/ML
1200 INJECTION INTRAVENOUS; SUBCUTANEOUS
Qty: 25000 | Refills: 0 | Status: DISCONTINUED | OUTPATIENT
Start: 2023-07-23 | End: 2023-07-23

## 2023-07-23 RX ADMIN — Medication 2: at 21:54

## 2023-07-23 RX ADMIN — INSULIN GLARGINE 10 UNIT(S): 100 INJECTION, SOLUTION SUBCUTANEOUS at 21:54

## 2023-07-23 RX ADMIN — Medication 81 MILLIGRAM(S): at 13:12

## 2023-07-23 RX ADMIN — HEPARIN SODIUM 13 UNIT(S)/HR: 5000 INJECTION INTRAVENOUS; SUBCUTANEOUS at 17:09

## 2023-07-23 RX ADMIN — Medication 20 MILLIGRAM(S): at 17:17

## 2023-07-23 RX ADMIN — Medication 8 UNIT(S): at 09:06

## 2023-07-23 RX ADMIN — Medication 500000 UNIT(S): at 13:09

## 2023-07-23 RX ADMIN — SEVELAMER CARBONATE 1600 MILLIGRAM(S): 2400 POWDER, FOR SUSPENSION ORAL at 17:15

## 2023-07-23 RX ADMIN — SEVELAMER CARBONATE 1600 MILLIGRAM(S): 2400 POWDER, FOR SUSPENSION ORAL at 13:12

## 2023-07-23 RX ADMIN — HEPARIN SODIUM 11 UNIT(S)/HR: 5000 INJECTION INTRAVENOUS; SUBCUTANEOUS at 00:13

## 2023-07-23 RX ADMIN — MYCOPHENOLATE MOFETIL 1 GRAM(S): 250 CAPSULE ORAL at 20:37

## 2023-07-23 RX ADMIN — Medication 80 MILLIGRAM(S): at 13:08

## 2023-07-23 RX ADMIN — Medication 1 TABLET(S): at 13:12

## 2023-07-23 RX ADMIN — ATORVASTATIN CALCIUM 40 MILLIGRAM(S): 80 TABLET, FILM COATED ORAL at 20:36

## 2023-07-23 RX ADMIN — Medication 500000 UNIT(S): at 09:04

## 2023-07-23 RX ADMIN — SEVELAMER CARBONATE 1600 MILLIGRAM(S): 2400 POWDER, FOR SUSPENSION ORAL at 09:05

## 2023-07-23 RX ADMIN — Medication 8 UNIT(S): at 13:10

## 2023-07-23 RX ADMIN — Medication 80 MILLIGRAM(S): at 20:36

## 2023-07-23 RX ADMIN — Medication 4: at 09:06

## 2023-07-23 RX ADMIN — MYCOPHENOLATE MOFETIL 1 GRAM(S): 250 CAPSULE ORAL at 09:05

## 2023-07-23 RX ADMIN — HEPARIN SODIUM 12 UNIT(S)/HR: 5000 INJECTION INTRAVENOUS; SUBCUTANEOUS at 09:02

## 2023-07-23 RX ADMIN — Medication 500000 UNIT(S): at 17:15

## 2023-07-23 RX ADMIN — Medication 20 MILLIGRAM(S): at 13:09

## 2023-07-23 RX ADMIN — CHLORHEXIDINE GLUCONATE 1 APPLICATION(S): 213 SOLUTION TOPICAL at 13:11

## 2023-07-23 RX ADMIN — FAMOTIDINE 20 MILLIGRAM(S): 10 INJECTION INTRAVENOUS at 13:11

## 2023-07-23 RX ADMIN — Medication 50 MILLIGRAM(S): at 20:36

## 2023-07-23 RX ADMIN — Medication 2: at 13:10

## 2023-07-23 RX ADMIN — TACROLIMUS 6 MILLIGRAM(S): 5 CAPSULE ORAL at 09:05

## 2023-07-23 RX ADMIN — Medication 8 UNIT(S): at 17:36

## 2023-07-23 NOTE — PROGRESS NOTE ADULT - SUBJECTIVE AND OBJECTIVE BOX
Transplant Surgery - Multidisciplinary Progress Note  --------------------------------------------------------------  DDRT 7/16/23 POD#7    Pt seen and examined by the multidisciplinary team: Surgery: Dr. Muñoz and Nephrology: Dr. Aguilar and NP Devon and unit RN. Rest of disciplines not in attendance to be notified of plan    52 yo M with hx of DM2 CAD, CHF, s/p CABG 2015, AICD (2016), on hemodialysis for approximately 6 years via L AVF (nephrologist Dr. Bharath Romo), He has hx of PVD, is s/p 4 stents in R leg (mid and distal right superficial femoral artery, right popliteal x2 on 1/14/22).; is sp RT big toe and second toe amputation 2021, on asa and plavix for severe PVD (per pt).   Now here for DDRT.     Underwent L DDRT 7/15/23 Simulect and solumedrol for induction.   CIT 16hrs, 1A,1V,1U.   Started on insulin drip intraoperatively for hyperkalemia after reperfusion.  Required pressors (Levo - 0.04) intraoperatively and received 2U PRBC.       MEDICATIONS  (STANDING):  aspirin  chewable 81 milliGRAM(s) Oral daily  atorvastatin 40 milliGRAM(s) Oral at bedtime  chlorhexidine 2% Cloths 1 Application(s) Topical daily  chlorhexidine 4% Liquid 1 Application(s) Topical <User Schedule>  famotidine    Tablet 20 milliGRAM(s) Oral daily  furosemide   Injectable 80 milliGRAM(s) IV Push once  heparin  Infusion 1200 Unit(s)/Hr (12 mL/Hr) IV Continuous <Continuous>  insulin glargine Injectable (LANTUS) 20 Unit(s) SubCutaneous at bedtime  insulin lispro (ADMELOG) corrective regimen sliding scale   SubCutaneous Before meals and at bedtime  insulin lispro Injectable (ADMELOG) 8 Unit(s) SubCutaneous three times a day before meals  mycophenolate mofetil 1 Gram(s) Oral <User Schedule>  nystatin    Suspension 559988 Unit(s) Swish and Swallow four times a day  polyethylene glycol 3350 17 Gram(s) Oral two times a day  predniSONE   Tablet 20 milliGRAM(s) Oral two times a day  predniSONE   Tablet   Oral   senna 2 Tablet(s) Oral at bedtime  sevelamer carbonate 1600 milliGRAM(s) Oral three times a day with meals  tacrolimus ER Tablet (ENVARSUS XR) 6 milliGRAM(s) Oral <User Schedule>  trimethoprim   80 mG/sulfamethoxazole 400 mG 1 Tablet(s) Oral daily  valGANciclovir 450 milliGRAM(s) Oral <User Schedule>    MEDICATIONS  (PRN):  sodium chloride 0.9% lock flush 10 milliLiter(s) IV Push every 1 hour PRN Pre/post blood products, medications, blood draw, and to maintain line patency  traMADol 25 milliGRAM(s) Oral every 6 hours PRN Moderate Pain (4 - 6)  traMADol 50 milliGRAM(s) Oral every 6 hours PRN Severe Pain (7 - 10)      PAST MEDICAL & SURGICAL HISTORY:  Diabetes mellitus  type 2      Foot ulcer due to secondary DM      Coronary artery disease      Acute on chronic systolic heart failure      Breast Reduction  at age 18      Toe amputation status, left      S/P CABG (coronary artery bypass graft)      AICD (automatic cardioverter/defibrillator) present          Vital Signs Last 24 Hrs  T(C): 36.3 (23 Jul 2023 08:00), Max: 37 (23 Jul 2023 05:35)  T(F): 97.3 (23 Jul 2023 08:00), Max: 98.6 (23 Jul 2023 05:35)  HR: 88 (23 Jul 2023 08:00) (82 - 92)  BP: 119/53 (23 Jul 2023 08:00) (119/53 - 151/69)  BP(mean): 87 (22 Jul 2023 20:00) (87 - 99)  RR: 18 (23 Jul 2023 08:00) (17 - 18)  SpO2: 100% (23 Jul 2023 08:00) (98% - 100%)    Parameters below as of 23 Jul 2023 08:00  Patient On (Oxygen Delivery Method): room air        I&O's Summary    22 Jul 2023 07:01  -  23 Jul 2023 07:00  --------------------------------------------------------  IN: 1754.6 mL / OUT: 660 mL / NET: 1094.6 mL                              8.1    2.45  )-----------( 57       ( 23 Jul 2023 06:31 )             24.7     07-23    134<L>  |  93<L>  |  72<H>  ----------------------------<  229<H>  3.9   |  26  |  5.58<H>    Ca    9.3      23 Jul 2023 06:32  Phos  5.6     07-23  Mg     2.4     07-23      Tacrolimus (), Serum: 8.3 ng/mL (07-23 @ 06:33)                    PHYSICAL EXAM:  GENERAL: NAD, well-groomed, well-developed  HEAD:  Atraumatic, Normocephalic  EYES: EOMI, PERRLA, conjunctiva and sclera clear  NECK: Supple, No JVD, L IJ TLC CDI  CHEST/LUNG: non-labored breathing on room air.   HEART: Regular rate and rhythm; S1/S2  ABDOMEN: Soft, Non-distended, LLQ incision with staples in place healing well. Miles drain with bulb suction SS output, no signs of infection.  : no granda, no uop  VASCULAR: Normal pulses, Normal capillary refill  EXTREMITIES:  2+ Peripheral Pulses, No cyanosis, L hand edema, LE edema improving b/l. AVF with + thrill.   SKIN: Warm, Intact

## 2023-07-23 NOTE — PROGRESS NOTE ADULT - SUBJECTIVE AND OBJECTIVE BOX
Subjective: Patient seen and examined. No new events except as noted.     SUBJECTIVE/ROS:  nad      MEDICATIONS:  MEDICATIONS  (STANDING):  aspirin  chewable 81 milliGRAM(s) Oral daily  atorvastatin 40 milliGRAM(s) Oral at bedtime  chlorhexidine 2% Cloths 1 Application(s) Topical daily  chlorhexidine 4% Liquid 1 Application(s) Topical <User Schedule>  famotidine    Tablet 20 milliGRAM(s) Oral daily  furosemide   Injectable 80 milliGRAM(s) IV Push once  heparin  Infusion 1200 Unit(s)/Hr (12 mL/Hr) IV Continuous <Continuous>  insulin glargine Injectable (LANTUS) 20 Unit(s) SubCutaneous at bedtime  insulin lispro (ADMELOG) corrective regimen sliding scale   SubCutaneous Before meals and at bedtime  insulin lispro Injectable (ADMELOG) 8 Unit(s) SubCutaneous three times a day before meals  mycophenolate mofetil 1 Gram(s) Oral <User Schedule>  nystatin    Suspension 204383 Unit(s) Swish and Swallow four times a day  polyethylene glycol 3350 17 Gram(s) Oral two times a day  predniSONE   Tablet 20 milliGRAM(s) Oral two times a day  predniSONE   Tablet   Oral   senna 2 Tablet(s) Oral at bedtime  sevelamer carbonate 1600 milliGRAM(s) Oral three times a day with meals  tacrolimus ER Tablet (ENVARSUS XR) 6 milliGRAM(s) Oral <User Schedule>  trimethoprim   80 mG/sulfamethoxazole 400 mG 1 Tablet(s) Oral daily  valGANciclovir 450 milliGRAM(s) Oral <User Schedule>      PHYSICAL EXAM:  T(C): 36.3 (07-23-23 @ 08:00), Max: 37 (07-23-23 @ 05:35)  HR: 88 (07-23-23 @ 08:00) (82 - 92)  BP: 119/53 (07-23-23 @ 08:00) (119/53 - 151/69)  RR: 18 (07-23-23 @ 08:00) (17 - 18)  SpO2: 100% (07-23-23 @ 08:00) (98% - 100%)  Wt(kg): --  I&O's Summary    22 Jul 2023 07:01  -  23 Jul 2023 07:00  --------------------------------------------------------  IN: 1754.6 mL / OUT: 660 mL / NET: 1094.6 mL            JVP: Normal  Neck: supple  Lung: clear   CV: S1 S2 , Murmur:  Abd: soft  Ext: No edema  neuro: Awake / alert  Psych: flat affect  Skin: normal``    LABS/DATA:    CARDIAC MARKERS:                                8.1    2.45  )-----------( 57       ( 23 Jul 2023 06:31 )             24.7     07-23    134<L>  |  93<L>  |  72<H>  ----------------------------<  229<H>  3.9   |  26  |  5.58<H>    Ca    9.3      23 Jul 2023 06:32  Phos  5.6     07-23  Mg     2.4     07-23      proBNP:   Lipid Profile:   HgA1c:   TSH:     TELE:  EKG:

## 2023-07-23 NOTE — PROGRESS NOTE ADULT - ASSESSMENT
CAD s/p cabg  stable   asa, statin    chronic systolic chf  stable  improving LV function   resume coreg once deemed safe as moi primary team   off ace/arb/arni for now   s/p ICD  interrogation noted     ESRD  s/p transplant   fu with transplant team     subclavian thrombosis  on a/c  plan for fistulogram   no CV objection to proceed

## 2023-07-23 NOTE — PROGRESS NOTE ADULT - ASSESSMENT
54 yo M with hx of DM2 CAD, CHF, s/p CABG 2015, AICD (2016), on hemodialysis for approximately 6 years via L AVF (nephrologist Dr. Bharath Romo), He has hx of PVD, is s/p 4 stents in R leg (mid and distal right superficial femoral artery, right popliteal x2 on 1/14/22).; is sp RT big toe and second toe amputation 2021, on asa and plavix for severe PVD (per pt).   Now here for DDRT.      Plan:  POD#7- L DDRT  DGF---> Last HD 7/21/23  Lasix 80mg IV on dose today  Continue heparin drip per vascular plan for fistula gram on Monday  Monitor urine output, creatinine, LIANA.   Restarted on home aspirin. Holding Plavix    Continue diabetic diet.    Immunosuppression:   - Switched from Simulect to Thymoglobulin.   - MMF, Methylpred taper, Env by level  - Valcyte to every other day (MWF)   -Envarsus 6 mg QD (FK goal 8-10 ng/dL)  DM:   - Monitor FS, patient now on ISS  - Started pre meal and Lantus, will adjust to goal     PAD:   - Aspirin restarted  - Plavix held     LUE swelling.   - LUE US : prelim with SC stenosis vs thrombus (known per patient)   - Vascular evaluation of AV,  started on heparin gtt ptt goal 40-60  - vascular planning Monday fistulogram

## 2023-07-23 NOTE — PROGRESS NOTE ADULT - SUBJECTIVE AND OBJECTIVE BOX
Vascular Surgery Progress Note    Subjective: Patient resting comfortably in chair. States he is intermittently "woozy" which is not unusual for him. Denies CP, SOB, vomiting. Notes his LUE has been swollen since his last dialysis session which he attributes partially to the position he sat in during HD.    Objective:  Vitals:  T(C): 36.5 (07-23-23 @ 12:23), Max: 37 (07-23-23 @ 05:35)  HR: 89 (07-23-23 @ 12:23) (82 - 92)  BP: 147/75 (07-23-23 @ 12:23) (119/53 - 151/69)  RR: 18 (07-23-23 @ 12:23) (17 - 18)  SpO2: 99% (07-23-23 @ 12:23) (98% - 100%)  Wt(kg): --    07-22 @ 07:01  -  07-23 @ 07:00  --------------------------------------------------------  IN:    Heparin: 10 mL    Heparin: 253 mL    IV PiggyBack: 531.6 mL    Oral Fluid: 960 mL  Total IN: 1754.6 mL    OUT:    Bulb (mL): 160 mL    Voided (mL): 500 mL  Total OUT: 660 mL    Total NET: 1094.6 mL      07-23 @ 07:01  -  07-23 @ 15:25  --------------------------------------------------------  IN:    Oral Fluid: 400 mL  Total IN: 400 mL    OUT:    Bulb (mL): 50 mL    Voided (mL): 200 mL  Total OUT: 250 mL    Total NET: 150 mL          Physical Exam:  General: WN/WD NAD  Neurology: A&Ox3, nonfocal, follows commands  Respiratory: nonlabored breathing on room air  CV: hemodynamically stable  Extremities: LUE edematous. Palpable RA pulse. Slight strikethrough bleeding on dressing over AVF. Palpable thrill.         Labs:                        8.1    2.45  )-----------( 57       ( 23 Jul 2023 06:31 )             24.7     07-23    134<L>  |  93<L>  |  72<H>  ----------------------------<  229<H>  3.9   |  26  |  5.58<H>    Ca    9.3      23 Jul 2023 06:32  Phos  5.6     07-23  Mg     2.4     07-23        PTT - ( 23 Jul 2023 06:32 )  PTT:34.5 sec  Urinalysis Basic - ( 23 Jul 2023 06:32 )    Color: x / Appearance: x / SG: x / pH: x  Gluc: 229 mg/dL / Ketone: x  / Bili: x / Urobili: x   Blood: x / Protein: x / Nitrite: x   Leuk Esterase: x / RBC: x / WBC x   Sq Epi: x / Non Sq Epi: x / Bacteria: x

## 2023-07-23 NOTE — PROGRESS NOTE ADULT - ASSESSMENT
53 yr old man with ESRD on HD for 6 years via left AVF. Nephrologist Dr. Bharath Romo.   PMH: DM2, CAD, CHF, s/p CABG 2015, AICD (2016),  PVD, is s/p 4 stents in R leg (mid and distal right superficial femoral artery, right popliteal x2 on 1/14/22).; is sp RT big toe and second toe amputation 2021, on asa and plavix for severe PVD.   S/p DDRT on 7/14/23 on left side. Donor 59 yrs old, KDPI 83%, Terminal Cr 0.8. Donor CMV +, EBV +. Single vessels and ureter stented. CIT 16 hours. Required 2 units PRBC intraop. Transferred to SICU post op due to hypotension requiring vasopressors.       1. 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  Tx US unremarkable.   Last HD was on 7/21/23. UOP improved post lasix yesterday. Will give lasix 80mg iv x1 today.   BUN/Cr slightly higher but electrolytes and volume status acceptable. No need for dialysis today. Will do HD after fistulogram tomorrow.   Continue sevelamer 1600mg po TID for hyperphosphatemia     2. Immunosuppression   Initially received Simulect induction - Changed to Thymoglobulin given delayed graft function. Completed dose #4 yesterday.  Steroid taper per protocol  MMF 1 gram po BID   Envarsus 6mg daily, trough goal 8-10. Level today 8.3 at goal.     3. Infection prophylaxis   Nystatin, Bactrim, Valcyte renal dose     4. LUE swelling US with left subclavian vein stenosis and thrombus. Continue heparin drip -conservative PTT target 40-60. Planned for thrombectomy and fistulogram/fistulopasty on 7/24/23      5. CAD s/p CABG/PVD - Continue Aspirin. Holding plavix while on anticoagulation      Coreg 3.125mg po bid    6. DM2- Lantus 20U and admelog 8 units pre meals .    7. Pancytopenia   Anemia - multifactorial, post op, CKD. Continue epo 10,000 units sq weekly .  Leukopenia and thrombocytopenia - medication induced, has completed thymo. Continue to monitor for now,  lower cellcept dose if declines further

## 2023-07-23 NOTE — PROGRESS NOTE ADULT - ASSESSMENT
53 year old male now POD7 s/p renal transplant 7/16. LUE swelling likely secondary to L subclavian thrombus noted on 7/18 duplex. Planned for fistulogram of left radiocephalic fistula tomorrow.    Plan:   - Preop for tomorrow  - Premedicate for contrast allergy tonight  - NPO at midnight  - Protect LUE   - Continue AC, hold heparin 6 hours before OR  - Consent signed today    Vascular Surgery  [id number]   53 year old male now POD7 s/p renal transplant 7/16. LUE swelling likely secondary to L subclavian thrombus noted on 7/18 duplex. Planned for fistulogram of left radiocephalic fistula tomorrow.    Recommendations:   - Preop for tomorrow  - Premedicate for contrast allergy tonight  - NPO at midnight  - Protect LUE   - Continue AC, hold heparin 6 hours before OR  - Consent signed today  - Elevate the LUE for swelling, do not apply compression    Vascular Surgery  [id number]

## 2023-07-23 NOTE — PROGRESS NOTE ADULT - SUBJECTIVE AND OBJECTIVE BOX
[facility] [place] [facility]   MD DANIEL WALDEN MD RUORU WONG, PA    TEL:  FROM 9 AM to 5 PM ---OFFICE: [phone]    FROM 5 PM - 9 AM PLEASE CALL ANSWERING SERVICE: [phone]    RENAL FOLLOW UP NOTE--Date of Service 07-23-23 @ 08:21  --------------------------------------------------------------------------------  HPI:      Pt seen and examined at bedside.   Denies SOB, chest pain     PAST HISTORY  --------------------------------------------------------------------------------  No significant changes to PMH, PSH, FHx, SHx, unless otherwise noted    ALLERGIES & MEDICATIONS  --------------------------------------------------------------------------------  Allergies    Contrast. (Unknown)    Intolerances      Standing Inpatient Medications  aspirin  chewable 81 milliGRAM(s) Oral daily  atorvastatin 40 milliGRAM(s) Oral at bedtime  chlorhexidine 2% Cloths 1 Application(s) Topical daily  chlorhexidine 4% Liquid 1 Application(s) Topical <User Schedule>  famotidine    Tablet 20 milliGRAM(s) Oral daily  insulin glargine Injectable (LANTUS) 20 Unit(s) SubCutaneous at bedtime  insulin lispro (ADMELOG) corrective regimen sliding scale   SubCutaneous Before meals and at bedtime  insulin lispro Injectable (ADMELOG) 8 Unit(s) SubCutaneous three times a day before meals  mycophenolate mofetil 1 Gram(s) Oral <User Schedule>  nystatin    Suspension 238194 Unit(s) Swish and Swallow four times a day  polyethylene glycol 3350 17 Gram(s) Oral two times a day  senna 2 Tablet(s) Oral at bedtime  sevelamer carbonate 1600 milliGRAM(s) Oral three times a day with meals  tacrolimus ER Tablet (ENVARSUS XR) 6 milliGRAM(s) Oral <User Schedule>  trimethoprim   80 mG/sulfamethoxazole 400 mG 1 Tablet(s) Oral daily  valGANciclovir 450 milliGRAM(s) Oral <User Schedule>    PRN Inpatient Medications  sodium chloride 0.9% lock flush 10 milliLiter(s) IV Push every 1 hour PRN  traMADol 25 milliGRAM(s) Oral every 6 hours PRN  traMADol 50 milliGRAM(s) Oral every 6 hours PRN      REVIEW OF SYSTEMS  --------------------------------------------------------------------------------  General: no fever  MSK: no edema     VITALS/PHYSICAL EXAM  --------------------------------------------------------------------------------  T(C): 37 (07-23-23 @ 05:35), Max: 37 (07-23-23 @ 05:35)  HR: 86 (07-23-23 @ 05:35) (82 - 92)  BP: 136/59 (07-23-23 @ 05:35) (133/67 - 151/69)  RR: 18 (07-23-23 @ 05:35) (17 - 18)  SpO2: 98% (07-23-23 @ 05:35) (98% - 100%)  Wt(kg): --        07-22-23 @ 07:01  -  07-23-23 @ 07:00  --------------------------------------------------------  IN: 1754.6 mL / OUT: 660 mL / NET: 1094.6 mL      Physical Exam:  	Gen: NAD  	HEENT: MMM  	Pulm: CTA B/L  	CV: S1S2  	Abd: Soft, +BS  	Ext: No LE edema B/L                      Neuro: Awake   	Skin: Warm and Dry   	Vascular access: avf           no  kalee  LABS/STUDIES  --------------------------------------------------------------------------------              8.1    2.45  >-----------<  57       [07-23-23 @ 06:31]              24.7     134  |  93  |  72  ----------------------------<  229      [07-23-23 @ 06:32]  3.9   |  26  |  5.58        Ca     9.3     [07-23-23 @ 06:32]      Mg     2.4     [07-23-23 @ 06:32]      Phos  5.6     [07-23-23 @ 06:32]        PTT: 34.5       [07-23-23 @ 06:32]      Creatinine Trend:  SCr 5.58 [07-23 @ 06:32]  SCr 4.45 [07-22 @ 06:56]  SCr 4.88 [07-21 @ 05:26]  SCr 5.24 [07-20 @ 06:35]  SCr 7.32 [07-19 @ 07:03]    Urinalysis - [07-23-23 @ 06:32]      Color  / Appearance  / SG  / pH       Gluc 229 / Ketone   / Bili  / Urobili        Blood  / Protein  / Leuk Est  / Nitrite       RBC  / WBC  / Hyaline  / Gran  / Sq Epi  / Non Sq Epi  / Bacteria         HBsAb 5.7      [07-15-23 @ 21:22]  HBsAg Nonreact      [07-15-23 @ 21:21]  HBcAb Nonreact      [07-15-23 @ 21:22]  HCV 0.16, Nonreact      [07-15-23 @ 21:22]  HIV Nonreact      [07-15-23 @ 21:21]

## 2023-07-23 NOTE — PROGRESS NOTE ADULT - ASSESSMENT
52 yo M with hx of DM2 CAD, CHF, s/p CABG 2015, AICD (2016), on hemodialysis for approximately 6 years via L AVF (nephrologist Dr. Bharath Romo), He has hx of PVD, is s/p 4 stents in R leg (mid and distal right superficial femoral artery, right popliteal x2 on 1/14/22).; is sp RT big toe and second toe amputation 2021, on asa and plavix for severe PVD (per pt). Now here for possible DDRT. Pt reports does not make urine only few drops occasionally. Pt denies N/V/D, fevers/chills, CP, SOB. Pt reports last ate at 4pm today and had HD yesterday 7/14/23.  Nephrology consulted for ESRD s/p DDRT.      ESRD on HD [facility]  Nephrologist : Dr. Thomas / Dr. Brooke at [facility]   s/p DDRT 7/16   s/p HD 7/19, 7/20, 7/21   HD per transplant team   HD consent in chart.   Renal diet   Monitor     s/p DDRT @ [facility] 7/16/2023   Underwent L DDRT 7/13/2023 s/p Simulect and solumedrol for induction.   Monitor BMP, urine output   Pt anuric at present, likely will require HD as present   Repeat graft US with patent vessels  Management per Transplant  LUE swelling US with left sub clavian vein stenosis and thrombosis. Vascular following & recommending CTV and started on heparin gtt. Planned for Fistulogram on MOnday  Continue immunosuppressants per transplant : Envarsus 6 mg qd, Cellcept 1 gm BID, and prednisone taper.   Initially received Simulect induction - Changed to Thymoglobulin given delayed graft function. Dose #2 today   Valcyte to every other day (MWF) Nystatin, Bactrim renal dose  Check tacrolimus trough 30 minutes prior to AM dose  Monitor urine output, creatinine, LIANA.       Hyperkalemia   in setting of ESRD and recent DDRT with sub-optimally functioning allograft.   s/p HD 7/19 and 7/20 , 7/21  Low K diet   Mgmt per Transplant  Monitor     HTN:   BP fluctuates   Monitor     Hyperphosphatemia  Low PO4 diet  Phoslo switched to sevelamer 1600 mg tid -- continue at present   Monitor PO4 daily

## 2023-07-23 NOTE — PROGRESS NOTE ADULT - SUBJECTIVE AND OBJECTIVE BOX
[facility] DIVISION OF KIDNEY DISEASES AND HYPERTENSION -- FOLLOW UP NOTE  --------------------------------------------------------------------------------  Authored by: Nba Aguilar   Cell # [phone]     ROSA CONDE was seen and examined at bedside.   Patient is a 53y old  Male who presents with a chief complaint of Possible DDRT (07-23-23)    24 hour events/subjective: Given lasix yesterday, UOP improved to 500ml/24 hours. Pain controlled. Edema better.   Ambulating, tolerating diet. Voiding urine. No NVD.     Standing Inpatient Medications  aspirin  chewable 81 milliGRAM(s) Oral daily  atorvastatin 40 milliGRAM(s) Oral at bedtime  chlorhexidine 2% Cloths 1 Application(s) Topical daily  chlorhexidine 4% Liquid 1 Application(s) Topical <User Schedule>  famotidine    Tablet 20 milliGRAM(s) Oral daily  furosemide   Injectable 80 milliGRAM(s) IV Push once  heparin  Infusion 1200 Unit(s)/Hr IV Continuous <Continuous>  insulin glargine Injectable (LANTUS) 20 Unit(s) SubCutaneous at bedtime  insulin lispro (ADMELOG) corrective regimen sliding scale   SubCutaneous Before meals and at bedtime  insulin lispro Injectable (ADMELOG) 8 Unit(s) SubCutaneous three times a day before meals  mycophenolate mofetil 1 Gram(s) Oral <User Schedule>  nystatin    Suspension 670865 Unit(s) Swish and Swallow four times a day  polyethylene glycol 3350 17 Gram(s) Oral two times a day  predniSONE   Tablet 20 milliGRAM(s) Oral two times a day  senna 2 Tablet(s) Oral at bedtime  sevelamer carbonate 1600 milliGRAM(s) Oral three times a day with meals  tacrolimus ER Tablet (ENVARSUS XR) 6 milliGRAM(s) Oral <User Schedule>  trimethoprim   80 mG/sulfamethoxazole 400 mG 1 Tablet(s) Oral daily  valGANciclovir 450 milliGRAM(s) Oral <User Schedule>    PRN Inpatient Medications  sodium chloride 0.9% lock flush 10 milliLiter(s) IV Push every 1 hour PRN  traMADol 25 milliGRAM(s) Oral every 6 hours PRN  traMADol 50 milliGRAM(s) Oral every 6 hours PRN        VITALS/PHYSICAL EXAM  --------------------------------------------------------------------------------  T(C): 36.3 (07-23-23 @ 08:00), Max: 37 (07-23-23 @ 05:35)  HR: 88 (07-23-23 @ 08:00) (82 - 92)  BP: 119/53 (07-23-23 @ 08:00) (119/53 - 151/69)  RR: 18 (07-23-23 @ 08:00) (17 - 18)  SpO2: 100% (07-23-23 @ 08:00) (98% - 100%)      07-22-23 @ 07:01  -  07-23-23 @ 07:00  --------------------------------------------------------  IN: 1754.6 mL / OUT: 660 mL / NET: 1094.6 mL      Physical Exam:  Awake and alert  Comfortably sitting in chair  Lungs clear   LUE edema+, AVF + thrill   No LE edema  Abdomen soft, LLQ wound + staples, one area slightly open, dressing stained with serosanguinous fluid, LIANA+   Alert and oriented       LABS/STUDIES  --------------------------------------------------------------------------------              8.1    2.45  >-----------<  57       [07-23-23 @ 06:31]              24.7     134  |  93  |  72  ----------------------------<  229      [07-23-23 @ 06:32]  3.9   |  26  |  5.58        Ca     9.3     [07-23-23 @ 06:32]      Mg     2.4     [07-23-23 @ 06:32]      Phos  5.6     [07-23-23 @ 06:32]        Creatinine Trend:  SCr 5.58 [07-23 @ 06:32]  SCr 4.45 [07-22 @ 06:56]  SCr 4.88 [07-21 @ 05:26]  SCr 5.24 [07-20 @ 06:35]  SCr 7.32 [07-19 @ 07:03]    Tacrolimus (), Serum: 8.3 ng/mL (07-23 @ 06:33)  Tacrolimus (), Serum: 7.1 ng/mL (07-22 @ 06:56)  Tacrolimus (), Serum: 9.2 ng/mL (07-21 @ 05:26)  Tacrolimus (), Serum: 9.1 ng/mL (07-20 @ 06:35)        CAPILLARY BLOOD GLUCOSE  POCT Blood Glucose.: 242 mg/dL (23 Jul 2023 08:29)  POCT Blood Glucose.: 224 mg/dL (22 Jul 2023 21:47)  POCT Blood Glucose.: 210 mg/dL (22 Jul 2023 17:06)  POCT Blood Glucose.: 166 mg/dL (22 Jul 2023 12:25)      Urinalysis - [07-23-23 @ 06:32]      Color  / Appearance  / SG  / pH       Gluc 229 / Ketone   / Bili  / Urobili        Blood  / Protein  / Leuk Est  / Nitrite       RBC  / WBC  / Hyaline  / Gran  / Sq Epi  / Non Sq Epi  / Bacteria         HBsAb 5.7      [07-15-23 @ 21:22]  HBsAg Nonreact      [07-15-23 @ 21:21]  HBcAb Nonreact      [07-15-23 @ 21:22]  HCV 0.16, Nonreact      [07-15-23 @ 21:22]  HIV Nonreact      [07-15-23 @ 21:21]

## 2023-07-24 ENCOUNTER — TRANSCRIPTION ENCOUNTER (OUTPATIENT)
Age: 53
End: 2023-07-24

## 2023-07-24 DIAGNOSIS — E83.39 OTHER DISORDERS OF PHOSPHORUS METABOLISM: ICD-10-CM

## 2023-07-24 LAB
ANION GAP SERPL CALC-SCNC: 15 MMOL/L — SIGNIFICANT CHANGE UP (ref 5–17)
APTT BLD: 26.7 SEC — LOW (ref 27.5–35.5)
APTT BLD: 50.2 SEC — HIGH (ref 27.5–35.5)
BLD GP AB SCN SERPL QL: NEGATIVE — SIGNIFICANT CHANGE UP
BUN SERPL-MCNC: 89 MG/DL — HIGH (ref 7–23)
CALCIUM SERPL-MCNC: 8.8 MG/DL — SIGNIFICANT CHANGE UP (ref 8.4–10.5)
CHLORIDE SERPL-SCNC: 93 MMOL/L — LOW (ref 96–108)
CO2 SERPL-SCNC: 24 MMOL/L — SIGNIFICANT CHANGE UP (ref 22–31)
CREAT SERPL-MCNC: 6.3 MG/DL — HIGH (ref 0.5–1.3)
EGFR: 10 ML/MIN/1.73M2 — LOW
GLUCOSE BLDC GLUCOMTR-MCNC: 216 MG/DL — HIGH (ref 70–99)
GLUCOSE BLDC GLUCOMTR-MCNC: 232 MG/DL — HIGH (ref 70–99)
GLUCOSE BLDC GLUCOMTR-MCNC: 251 MG/DL — HIGH (ref 70–99)
GLUCOSE BLDC GLUCOMTR-MCNC: 254 MG/DL — HIGH (ref 70–99)
GLUCOSE BLDC GLUCOMTR-MCNC: 257 MG/DL — HIGH (ref 70–99)
GLUCOSE BLDC GLUCOMTR-MCNC: 288 MG/DL — HIGH (ref 70–99)
GLUCOSE SERPL-MCNC: 226 MG/DL — HIGH (ref 70–99)
HCT VFR BLD CALC: 22.5 % — LOW (ref 39–50)
HGB BLD-MCNC: 7.5 G/DL — LOW (ref 13–17)
INR BLD: 0.99 RATIO — SIGNIFICANT CHANGE UP (ref 0.88–1.16)
MAGNESIUM SERPL-MCNC: 2.4 MG/DL — SIGNIFICANT CHANGE UP (ref 1.6–2.6)
MCHC RBC-ENTMCNC: 30.1 PG — SIGNIFICANT CHANGE UP (ref 27–34)
MCHC RBC-ENTMCNC: 33.3 GM/DL — SIGNIFICANT CHANGE UP (ref 32–36)
MCV RBC AUTO: 90.4 FL — SIGNIFICANT CHANGE UP (ref 80–100)
NRBC # BLD: 0 /100 WBCS — SIGNIFICANT CHANGE UP (ref 0–0)
PHOSPHATE SERPL-MCNC: 5.6 MG/DL — HIGH (ref 2.5–4.5)
PLATELET # BLD AUTO: 83 K/UL — LOW (ref 150–400)
POTASSIUM SERPL-MCNC: 4.2 MMOL/L — SIGNIFICANT CHANGE UP (ref 3.5–5.3)
POTASSIUM SERPL-SCNC: 4.2 MMOL/L — SIGNIFICANT CHANGE UP (ref 3.5–5.3)
PROTHROM AB SERPL-ACNC: 11.5 SEC — SIGNIFICANT CHANGE UP (ref 10.5–13.4)
RBC # BLD: 2.49 M/UL — LOW (ref 4.2–5.8)
RBC # FLD: 16.6 % — HIGH (ref 10.3–14.5)
RH IG SCN BLD-IMP: POSITIVE — SIGNIFICANT CHANGE UP
SODIUM SERPL-SCNC: 132 MMOL/L — LOW (ref 135–145)
TACROLIMUS SERPL-MCNC: 9.6 NG/ML — SIGNIFICANT CHANGE UP
WBC # BLD: 5 K/UL — SIGNIFICANT CHANGE UP (ref 3.8–10.5)
WBC # FLD AUTO: 5 K/UL — SIGNIFICANT CHANGE UP (ref 3.8–10.5)

## 2023-07-24 PROCEDURE — 36907 BALO ANGIOP CTR DIALYSIS SEG: CPT

## 2023-07-24 PROCEDURE — 99232 SBSQ HOSP IP/OBS MODERATE 35: CPT | Mod: GC

## 2023-07-24 PROCEDURE — 99232 SBSQ HOSP IP/OBS MODERATE 35: CPT | Mod: 24

## 2023-07-24 PROCEDURE — 36901 INTRO CATH DIALYSIS CIRCUIT: CPT

## 2023-07-24 DEVICE — IMPLANTABLE DEVICE: Type: IMPLANTABLE DEVICE | Status: FUNCTIONAL

## 2023-07-24 DEVICE — GUIDEWIRE RADIFOCUS GLIDEWIRE STANDARD ANGLED TIP 0.035" X 260CM: Type: IMPLANTABLE DEVICE | Status: FUNCTIONAL

## 2023-07-24 DEVICE — CATH QUICK CROSS .035X135CM: Type: IMPLANTABLE DEVICE | Status: FUNCTIONAL

## 2023-07-24 DEVICE — BLLN MUSTANG 5X40MMX75CM: Type: IMPLANTABLE DEVICE | Status: FUNCTIONAL

## 2023-07-24 DEVICE — GLDWIRE ADVANTAGE .035X180 CM: Type: IMPLANTABLE DEVICE | Status: FUNCTIONAL

## 2023-07-24 DEVICE — GUIDEWIRE AMPLATZ SUPER-STIFF STRAIGHT .035" X 260CM: Type: IMPLANTABLE DEVICE | Status: FUNCTIONAL

## 2023-07-24 DEVICE — SHEATH INTRODUCER TERUMO PINNACLE PERIPHERAL 7FR X 10CM: Type: IMPLANTABLE DEVICE | Status: FUNCTIONAL

## 2023-07-24 DEVICE — CATH ANGIO GLIDECATH ANGLE TAPER 5FR X 65CM: Type: IMPLANTABLE DEVICE | Status: FUNCTIONAL

## 2023-07-24 DEVICE — GUIDEWIRE BENTSON 0.035" X 145CM: Type: IMPLANTABLE DEVICE | Status: FUNCTIONAL

## 2023-07-24 DEVICE — WIRE APPROACH HYDRO STRT 300CM: Type: IMPLANTABLE DEVICE | Status: FUNCTIONAL

## 2023-07-24 DEVICE — GUIDEWIRE TERUMO GLIDEWIRE ANGLED .035" X 150CM STANDARD: Type: IMPLANTABLE DEVICE | Status: FUNCTIONAL

## 2023-07-24 DEVICE — GWIRE ANGL .035X150 STIFF: Type: IMPLANTABLE DEVICE | Status: FUNCTIONAL

## 2023-07-24 RX ORDER — FUROSEMIDE 40 MG
80 TABLET ORAL ONCE
Refills: 0 | Status: COMPLETED | OUTPATIENT
Start: 2023-07-24 | End: 2023-07-24

## 2023-07-24 RX ORDER — CHLORHEXIDINE GLUCONATE 213 G/1000ML
1 SOLUTION TOPICAL
Refills: 0 | Status: DISCONTINUED | OUTPATIENT
Start: 2023-07-24 | End: 2023-07-28

## 2023-07-24 RX ORDER — INSULIN LISPRO 100/ML
VIAL (ML) SUBCUTANEOUS
Refills: 0 | Status: DISCONTINUED | OUTPATIENT
Start: 2023-07-24 | End: 2023-07-28

## 2023-07-24 RX ORDER — TACROLIMUS 5 MG/1
6 CAPSULE ORAL
Refills: 0 | Status: DISCONTINUED | OUTPATIENT
Start: 2023-07-24 | End: 2023-07-26

## 2023-07-24 RX ORDER — INSULIN LISPRO 100/ML
8 VIAL (ML) SUBCUTANEOUS
Refills: 0 | Status: DISCONTINUED | OUTPATIENT
Start: 2023-07-24 | End: 2023-07-25

## 2023-07-24 RX ORDER — HYDROMORPHONE HYDROCHLORIDE 2 MG/ML
0.5 INJECTION INTRAMUSCULAR; INTRAVENOUS; SUBCUTANEOUS
Refills: 0 | Status: DISCONTINUED | OUTPATIENT
Start: 2023-07-24 | End: 2023-07-24

## 2023-07-24 RX ORDER — ASPIRIN/CALCIUM CARB/MAGNESIUM 324 MG
81 TABLET ORAL DAILY
Refills: 0 | Status: DISCONTINUED | OUTPATIENT
Start: 2023-07-24 | End: 2023-07-28

## 2023-07-24 RX ORDER — ATORVASTATIN CALCIUM 80 MG/1
40 TABLET, FILM COATED ORAL AT BEDTIME
Refills: 0 | Status: DISCONTINUED | OUTPATIENT
Start: 2023-07-24 | End: 2023-07-28

## 2023-07-24 RX ORDER — TRAMADOL HYDROCHLORIDE 50 MG/1
25 TABLET ORAL EVERY 6 HOURS
Refills: 0 | Status: DISCONTINUED | OUTPATIENT
Start: 2023-07-24 | End: 2023-07-28

## 2023-07-24 RX ORDER — ONDANSETRON 8 MG/1
4 TABLET, FILM COATED ORAL ONCE
Refills: 0 | Status: DISCONTINUED | OUTPATIENT
Start: 2023-07-24 | End: 2023-07-24

## 2023-07-24 RX ORDER — FUROSEMIDE 40 MG
80 TABLET ORAL
Refills: 0 | Status: DISCONTINUED | OUTPATIENT
Start: 2023-07-24 | End: 2023-07-24

## 2023-07-24 RX ORDER — MYCOPHENOLATE MOFETIL 250 MG/1
1 CAPSULE ORAL
Refills: 0 | Status: DISCONTINUED | OUTPATIENT
Start: 2023-07-24 | End: 2023-07-28

## 2023-07-24 RX ORDER — NYSTATIN 500MM UNIT
500000 POWDER (EA) MISCELLANEOUS
Refills: 0 | Status: DISCONTINUED | OUTPATIENT
Start: 2023-07-24 | End: 2023-07-28

## 2023-07-24 RX ORDER — VALGANCICLOVIR 450 MG/1
450 TABLET, FILM COATED ORAL
Refills: 0 | Status: DISCONTINUED | OUTPATIENT
Start: 2023-07-24 | End: 2023-07-28

## 2023-07-24 RX ORDER — FAMOTIDINE 10 MG/ML
20 INJECTION INTRAVENOUS DAILY
Refills: 0 | Status: DISCONTINUED | OUTPATIENT
Start: 2023-07-24 | End: 2023-07-28

## 2023-07-24 RX ORDER — TRAMADOL HYDROCHLORIDE 50 MG/1
50 TABLET ORAL EVERY 6 HOURS
Refills: 0 | Status: DISCONTINUED | OUTPATIENT
Start: 2023-07-24 | End: 2023-07-28

## 2023-07-24 RX ORDER — INSULIN GLARGINE 100 [IU]/ML
15 INJECTION, SOLUTION SUBCUTANEOUS AT BEDTIME
Refills: 0 | Status: DISCONTINUED | OUTPATIENT
Start: 2023-07-24 | End: 2023-07-25

## 2023-07-24 RX ORDER — INSULIN GLARGINE 100 [IU]/ML
10 INJECTION, SOLUTION SUBCUTANEOUS AT BEDTIME
Refills: 0 | Status: DISCONTINUED | OUTPATIENT
Start: 2023-07-24 | End: 2023-07-24

## 2023-07-24 RX ORDER — SODIUM CHLORIDE 9 MG/ML
1000 INJECTION INTRAMUSCULAR; INTRAVENOUS; SUBCUTANEOUS
Refills: 0 | Status: DISCONTINUED | OUTPATIENT
Start: 2023-07-24 | End: 2023-07-24

## 2023-07-24 RX ORDER — SENNA PLUS 8.6 MG/1
2 TABLET ORAL AT BEDTIME
Refills: 0 | Status: DISCONTINUED | OUTPATIENT
Start: 2023-07-24 | End: 2023-07-28

## 2023-07-24 RX ADMIN — SODIUM CHLORIDE 70 MILLILITER(S): 9 INJECTION INTRAMUSCULAR; INTRAVENOUS; SUBCUTANEOUS at 00:38

## 2023-07-24 RX ADMIN — MYCOPHENOLATE MOFETIL 1 GRAM(S): 250 CAPSULE ORAL at 19:48

## 2023-07-24 RX ADMIN — Medication 50 MILLIGRAM(S): at 06:34

## 2023-07-24 RX ADMIN — ATORVASTATIN CALCIUM 40 MILLIGRAM(S): 80 TABLET, FILM COATED ORAL at 21:35

## 2023-07-24 RX ADMIN — VALGANCICLOVIR 450 MILLIGRAM(S): 450 TABLET, FILM COATED ORAL at 06:34

## 2023-07-24 RX ADMIN — Medication 500000 UNIT(S): at 17:23

## 2023-07-24 RX ADMIN — SODIUM CHLORIDE 70 MILLILITER(S): 9 INJECTION INTRAMUSCULAR; INTRAVENOUS; SUBCUTANEOUS at 11:02

## 2023-07-24 RX ADMIN — Medication 8 UNIT(S): at 12:46

## 2023-07-24 RX ADMIN — Medication 50 MILLIGRAM(S): at 00:38

## 2023-07-24 RX ADMIN — CHLORHEXIDINE GLUCONATE 1 APPLICATION(S): 213 SOLUTION TOPICAL at 07:36

## 2023-07-24 RX ADMIN — FAMOTIDINE 20 MILLIGRAM(S): 10 INJECTION INTRAVENOUS at 12:04

## 2023-07-24 RX ADMIN — TACROLIMUS 6 MILLIGRAM(S): 5 CAPSULE ORAL at 07:17

## 2023-07-24 RX ADMIN — INSULIN GLARGINE 15 UNIT(S): 100 INJECTION, SOLUTION SUBCUTANEOUS at 21:35

## 2023-07-24 RX ADMIN — MYCOPHENOLATE MOFETIL 1 GRAM(S): 250 CAPSULE ORAL at 07:17

## 2023-07-24 RX ADMIN — Medication 80 MILLIGRAM(S): at 14:40

## 2023-07-24 RX ADMIN — Medication 1 TABLET(S): at 12:03

## 2023-07-24 RX ADMIN — VALGANCICLOVIR 450 MILLIGRAM(S): 450 TABLET, FILM COATED ORAL at 12:04

## 2023-07-24 RX ADMIN — Medication 6: at 11:01

## 2023-07-24 RX ADMIN — Medication 500000 UNIT(S): at 06:34

## 2023-07-24 RX ADMIN — Medication 6: at 21:35

## 2023-07-24 RX ADMIN — Medication 500000 UNIT(S): at 12:03

## 2023-07-24 RX ADMIN — Medication 81 MILLIGRAM(S): at 12:04

## 2023-07-24 RX ADMIN — Medication 6: at 06:46

## 2023-07-24 RX ADMIN — Medication 4: at 18:15

## 2023-07-24 RX ADMIN — Medication 8 UNIT(S): at 18:15

## 2023-07-24 NOTE — BRIEF OPERATIVE NOTE - OPERATION/FINDINGS
Stenosis of the left subclavian/innominate vein Stenosis of the left subclavian/innominate vein  DCB of stenosis with 7mm Lutonix DCB

## 2023-07-24 NOTE — PRE-ANESTHESIA EVALUATION ADULT - NSANTHOSAYNRD_GEN_A_CORE
No. VICTORIANO screening performed.  STOP BANG Legend: 0-2 = LOW Risk; 3-4 = INTERMEDIATE Risk; 5-8 = HIGH Risk
No. VICTORIANO screening performed.  STOP BANG Legend: 0-2 = LOW Risk; 3-4 = INTERMEDIATE Risk; 5-8 = HIGH Risk

## 2023-07-24 NOTE — PROGRESS NOTE ADULT - SUBJECTIVE AND OBJECTIVE BOX
Transplant Surgery - Multidisciplinary Progress Note  --------------------------------------------------------------  DDRT 7/16/23 POD # 8    Present: Patient seen and examined with multidisciplinary team including Transplant Surgeon: Dr. Muñoz. Dr. Calero. Transplant Nephrologist: Dr. Morel, Transplant pharmacist JUDIE Plunkett. NP Devon, NP Student MICAH Falk and unit RN during am rounds.  Disciplines not in attendance will be notified of the plan.     52 yo M with hx of DM2 CAD, CHF, s/p CABG 2015, AICD (2016), on hemodialysis for approximately 6 years via L AVF (nephrologist Dr. Bharath Romo), He has hx of PVD, is s/p 4 stents in R leg (mid and distal right superficial femoral artery, right popliteal x2 on 1/14/22).; is sp RT big toe and second toe amputation 2021, on asa and plavix for severe PVD (per pt).   Now here for DDRT.     Underwent L DDRT 7/15/23 Simulect and solumedrol for induction.   CIT 16hrs, 1A,1V,1U.   Started on insulin drip intraoperatively for hyperkalemia after reperfusion.  Required pressors (Levo - 0.04) intraoperatively and received 2U PRBC.       Interval events:   -S/P HD 7/21/23  -Hep gtt held for fistula gram today  -Started on laisx 80 IV BID with increased UO 1100ml       Immunosuppression:  Induction with Thymoglobulin   Maintenance Immunosuppression:  Envarsus 6mg, MMF 1g bid, SST     Ongoing monitoring for signs of rejection.    Potential Discharge date: pending clinical improvement  Education:  Medications  Plan of care:  See Below         MEDICATIONS  (STANDING):  aspirin  chewable 81 milliGRAM(s) Oral daily  atorvastatin 40 milliGRAM(s) Oral at bedtime  chlorhexidine 4% Liquid 1 Application(s) Topical <User Schedule>  famotidine    Tablet 20 milliGRAM(s) Oral daily  furosemide   Injectable 80 milliGRAM(s) IV Push two times a day  insulin glargine Injectable (LANTUS) 15 Unit(s) SubCutaneous at bedtime  insulin lispro (ADMELOG) corrective regimen sliding scale   SubCutaneous Before meals and at bedtime  insulin lispro Injectable (ADMELOG) 8 Unit(s) SubCutaneous three times a day before meals  mycophenolate mofetil 1 Gram(s) Oral <User Schedule>  nystatin    Suspension 834323 Unit(s) Swish and Swallow four times a day  predniSONE   Tablet 5 milliGRAM(s) Oral daily  senna 2 Tablet(s) Oral at bedtime  tacrolimus ER Tablet (ENVARSUS XR) 6 milliGRAM(s) Oral <User Schedule>  trimethoprim   80 mG/sulfamethoxazole 400 mG 1 Tablet(s) Oral daily  valGANciclovir 450 milliGRAM(s) Oral <User Schedule>    MEDICATIONS  (PRN):  traMADol 25 milliGRAM(s) Oral every 6 hours PRN Moderate Pain (4 - 6)  traMADol 50 milliGRAM(s) Oral every 6 hours PRN Severe Pain (7 - 10)      PAST MEDICAL & SURGICAL HISTORY:  Diabetes mellitus type 2  Foot ulcer due to secondary DM  Coronary artery disease  Acute on chronic systolic heart failure  Breast Reduction at age 18  Toe amputation status, left  S/P CABG (coronary artery bypass graft)  AICD (automatic cardioverter/defibrillator) present      Vital Signs Last 24 Hrs  T(C): 36.5 (24 Jul 2023 13:00), Max: 36.7 (24 Jul 2023 05:00)  T(F): 97.7 (24 Jul 2023 13:00), Max: 98 (24 Jul 2023 05:00)  HR: 86 (24 Jul 2023 13:00) (84 - 89)  BP: 133/76 (24 Jul 2023 13:00) (96/52 - 155/79)  BP(mean): 84 (24 Jul 2023 11:00) (71 - 87)  RR: 18 (24 Jul 2023 13:00) (15 - 18)  SpO2: 98% (24 Jul 2023 13:00) (98% - 100%)    Parameters below as of 24 Jul 2023 13:00  Patient On (Oxygen Delivery Method): room air        I&O's Summary    23 Jul 2023 07:01  -  24 Jul 2023 07:00  --------------------------------------------------------  IN: 1482 mL / OUT: 1275 mL / NET: 207 mL    24 Jul 2023 07:01  -  24 Jul 2023 13:55  --------------------------------------------------------  IN: 500 mL / OUT: 440 mL / NET: 60 mL                       7.5    5.00  )-----------( 83       ( 24 Jul 2023 05:06 )             22.5     07-24    132<L>  |  93<L>  |  89<H>  ----------------------------<  226<H>  4.2   |  24  |  6.30<H>    Ca    8.8      24 Jul 2023 05:06  Phos  5.6     07-24  Mg     2.4     07-24      Tacrolimus (), Serum: 9.6 ng/mL (07-24 @ 05:06)       REVIEW OF SYSTEMS     All other systems were reviewed and are negative.  PHYSICAL EXAM:  GENERAL: NAD, well-groomed, well-developed  HEAD:  Atraumatic, Normocephalic  EYES: EOMI, PERRLA, conjunctiva and sclera clear  NECK: Supple, No JVD, L IJ TLC CDI  CHEST/LUNG: non-labored breathing on room air.   HEART: Regular rate and rhythm; S1/S2  ABDOMEN: Soft, Non-distended, LLQ incision with staples in place healing well. Miles drain with bulb suction SS output, no signs of infection.  : no granda, no uop  VASCULAR: Normal pulses, Normal capillary refill  EXTREMITIES:  2+ Peripheral Pulses, No cyanosis, L hand edema, LE edema improving b/l. AVF with + thrill.   SKIN: Warm, Intact

## 2023-07-24 NOTE — PROGRESS NOTE ADULT - ASSESSMENT
53 year old male now POD7 s/p renal transplant 7/16. LUE swelling likely secondary to L subclavian thrombus noted on 7/18 duplex. Planned for fistulogram of left radiocephalic fistula 7/24.    Recommendations:   - Premedicate for contrast allergy this AM at 0630  - NPO at midnight  - Protect LUE   - Continue AC, hold heparin 6 hours before OR  - Consented  - Elevate the LUE for swelling, do not apply compression    Vascular Surgery  [phone]

## 2023-07-24 NOTE — BRIEF OPERATIVE NOTE - NSICDXBRIEFPREOP_GEN_ALL_CORE_FT
PRE-OP DIAGNOSIS:  ESRD on dialysis 16-Jul-2023 07:11:56  Oswaldo Alba  
PRE-OP DIAGNOSIS:  ESRD on dialysis 16-Jul-2023 07:11:56  Oswaldo Alba

## 2023-07-24 NOTE — PROGRESS NOTE ADULT - ASSESSMENT
54 yo M with hx of DM2 CAD, CHF, s/p CABG 2015, AICD (2016), on hemodialysis for approximately 6 years via L AVF (nephrologist Dr. Bharath Romo), He has hx of PVD, is s/p 4 stents in R leg (mid and distal right superficial femoral artery, right popliteal x2 on 1/14/22).; is sp RT big toe and second toe amputation 2021, on asa and plavix for severe PVD (per pt).   Now here for DDRT.      Plan:  POD#8- L DDRT  DGF---> Last HD 7/21/23  Lasix 80mg IV BID  Monitor urine output   On aspirin. Holding Plavix    Continue diabetic diet.    Immunosuppression:   - Switched from Simulect to Thymoglobulin completed on 7/22/23  - MMF, Methylpred taper, Env by level  - Valcyte to every other day (MWF)   -Envarsus 6 mg QD (FK goal 8-10 ng/dL)  DM:   - Monitor FS, patient now on ISS  - Adjusted pre meal and Lantus, will adjust to goal     PAD:   - Aspirin restarted  - Plavix held     LUE swelling.   - LUE US : prelim with SC stenosis vs thrombus (known per patient)   - S/P vascular fistula gram this am   - Will follow up AC plan with vascular today

## 2023-07-24 NOTE — BRIEF OPERATIVE NOTE - NSICDXBRIEFPOSTOP_GEN_ALL_CORE_FT
POST-OP DIAGNOSIS:  ESRD on dialysis 16-Jul-2023 07:12:04  Oswaldo Alba  
POST-OP DIAGNOSIS:  ESRD on dialysis 16-Jul-2023 07:12:04  Oswaldo Alba

## 2023-07-24 NOTE — PROGRESS NOTE ADULT - SUBJECTIVE AND OBJECTIVE BOX
Subjective: Patient seen and examined. No new events except as noted.     SUBJECTIVE/ROS:        MEDICATIONS:  MEDICATIONS  (STANDING):      PHYSICAL EXAM:  T(C): 36.7 (07-24-23 @ 07:41), Max: 36.7 (07-24-23 @ 05:00)  HR: 88 (07-24-23 @ 07:41) (86 - 89)  BP: 139/75 (07-24-23 @ 07:41) (136/67 - 155/79)  RR: 18 (07-24-23 @ 07:41) (18 - 18)  SpO2: 98% (07-24-23 @ 07:41) (98% - 100%)  Wt(kg): --  I&O's Summary    23 Jul 2023 07:01  -  24 Jul 2023 07:00  --------------------------------------------------------  IN: 1482 mL / OUT: 1275 mL / NET: 207 mL      Height (cm): 175.3 (07-24 @ 07:41)  Weight (kg): 96.6 (07-24 @ 07:41)  BMI (kg/m2): 31.4 (07-24 @ 07:41)  BSA (m2): 2.12 (07-24 @ 07:41)      JVP: Normal  Neck: supple  Lung: clear   CV: S1 S2 , Murmur:  Abd: soft  Ext: No edema  neuro: Awake / alert  Psych: flat affect  Skin: normal``    LABS/DATA:    CARDIAC MARKERS:                                7.5    5.00  )-----------( 83       ( 24 Jul 2023 05:06 )             22.5     07-24    132<L>  |  93<L>  |  89<H>  ----------------------------<  226<H>  4.2   |  24  |  6.30<H>    Ca    8.8      24 Jul 2023 05:06  Phos  5.6     07-24  Mg     2.4     07-24      proBNP:   Lipid Profile:   HgA1c:   TSH:     TELE:  EKG:

## 2023-07-24 NOTE — BRIEF OPERATIVE NOTE - NSICDXBRIEFPROCEDURE_GEN_ALL_CORE_FT
PROCEDURES:  Open transplantation of kidney from  donor 2023 07:11:34  Oswaldo Alba preparation of kidney from  donor 2023 07:11:49  Oswaldo Alba  
Bladder non-tender and non-distended. Urine clear yellow.
PROCEDURES:  Arteriovenous fistulogram with angioplasty 24-Jul-2023 10:00:52  Tiffany Aguilar

## 2023-07-24 NOTE — PROGRESS NOTE ADULT - ASSESSMENT
54 yo M with hx of DM2 CAD, CHF, s/p CABG 2015, AICD (2016), on hemodialysis for approximately 6 years via L AVF (nephrologist Dr. Bharath Romo), He has hx of PVD, is s/p 4 stents in R leg (mid and distal right superficial femoral artery, right popliteal x2 on 1/14/22).; is sp RT big toe and second toe amputation 2021, on asa and plavix for severe PVD (per pt). Now here for possible DDRT. Pt reports does not make urine only few drops occasionally. Pt denies N/V/D, fevers/chills, CP, SOB. Pt reports last ate at 4pm today and had HD yesterday 7/14/23.  Nephrology consulted for ESRD s/p DDRT.      ESRD on HD [facility]  Nephrologist : Dr. Thomas / Dr. Brooke at [facility]   s/p DDRT 7/16   s/p HD 7/19- 7/22  s/p Left Radiocephalic fistulogram and subclavian vein angioplasty with Vascular today   HD per transplant team   HD consent in chart.   Renal diet   Monitor     s/p DDRT @ [facility] 7/16/2023   Underwent L DDRT 7/13/2023 s/p Simulect and solumedrol for induction.   Monitor BMP, urine output   Pt anuric at present, likely will require HD as present   Repeat graft US with patent vessels  Management per Transplant  LUE swelling--> LUE swelling likely secondary to L subclavian thrombus noted on 7/18 duplex. S/p for left radiocephalic fistulogram and subclavian vein angioplasty (7/24).   Documented urine output is 1.1L over the past 24 hours on IV Lasix. Per Transplant, no acute indication for HD currently. Transplant recommending Lasix 80 mg IV today.   Continue immunosuppressants per transplant : Envarsus 6 mg qd, Cellcept 1 gm BID, and prednisone taper.   Initially received Simulect induction - Changed to Thymoglobulin given delayed graft function. Dose #2 7/22  Valcyte to every other day (MWF) Nystatin, Bactrim renal dose  Check tacrolimus trough 30 minutes prior to AM dose  Tacro level 7/24 is 9.6.   Monitor urine output/ sCr -- Per TP, pt likely will need to continue HD upon d/c      Hyperkalemia   in setting of ESRD and recent DDRT with sub-optimally functioning allograft.   s/p HD 7/19 - 7/22  Low K diet   Mgmt per Transplant  Monitor     HTN:   BP fluctuates   Monitor     Hyperphosphatemia  Low PO4 diet  Phoslo switched to sevelamer 1600 mg tid -- continue at present   Monitor PO4 daily 54 yo M with hx of DM2 CAD, CHF, s/p CABG 2015, AICD (2016), on hemodialysis for approximately 6 years via L AVF (nephrologist Dr. Bharath Romo), He has hx of PVD, is s/p 4 stents in R leg (mid and distal right superficial femoral artery, right popliteal x2 on 1/14/22).; is sp RT big toe and second toe amputation 2021, on asa and plavix for severe PVD (per pt). Now here for possible DDRT. Pt reports does not make urine only few drops occasionally. Pt denies N/V/D, fevers/chills, CP, SOB. Pt reports last ate at 4pm today and had HD yesterday 7/14/23.  Nephrology consulted for ESRD s/p DDRT.      ESRD on HD [facility]  Nephrologist : Dr. Thomas / Dr. Brooke at [facility]   s/p DDRT 7/16   s/p HD 7/19- 7/22  s/p Left Radiocephalic fistulogram and subclavian vein angioplasty with Vascular 7/24  HD per transplant team   HD consent in chart.   Renal diet   Monitor     s/p DDRT @ [facility] 7/16/2023   Underwent L DDRT 7/13/2023 s/p Simulect and solumedrol for induction.   Monitor BMP, urine output   Pt anuric at present, likely will require HD as present   Repeat graft US with patent vessels  Management per Transplant  LUE swelling--> LUE swelling likely secondary to L subclavian thrombus noted on 7/18 duplex. S/p for left radiocephalic fistulogram and subclavian vein angioplasty (7/24).   Documented urine output is 1.1L over the past 24 hours on IV Lasix. Per Transplant, no acute indication for HD currently. Transplant recommending Lasix 80 mg IV today.   Continue immunosuppressants per transplant : Envarsus 6 mg qd, Cellcept 1 gm BID, and prednisone taper.   Initially received Simulect induction - Changed to Thymoglobulin given delayed graft function. Dose #2 7/22  Valcyte to every other day (MWF) Nystatin, Bactrim renal dose  Check tacrolimus trough 30 minutes prior to AM dose  Tacro level 7/24 is 9.6.   Monitor urine output/ sCr -- Per TP, pt likely will need to continue HD upon d/c      Hyperkalemia   in setting of ESRD and recent DDRT with sub-optimally functioning allograft.   s/p HD 7/19 - 7/22  Low K diet   Mgmt per Transplant  Monitor     HTN:   BP fluctuates   Monitor     Hyperphosphatemia  Low PO4 diet  Phoslo switched to sevelamer 1600 mg tid -- continue at present   Monitor PO4 daily

## 2023-07-24 NOTE — CHART NOTE - NSCHARTNOTEFT_GEN_A_CORE
Vascular Surgery Post op Check    Procedure:  s/p fistulogram and subclavian vein angioplasty    Pt seen and examined without complaints. Pain is controlled. Denies SOB/CP/N/V. Tolerating regular diet.     Vital Signs Last 24 Hrs  T(C): 36.5 (24 Jul 2023 13:00), Max: 36.7 (24 Jul 2023 05:00)  T(F): 97.7 (24 Jul 2023 13:00), Max: 98 (24 Jul 2023 05:00)  HR: 86 (24 Jul 2023 13:00) (84 - 89)  BP: 133/76 (24 Jul 2023 13:00) (96/52 - 155/79)  BP(mean): 84 (24 Jul 2023 11:00) (71 - 87)  RR: 18 (24 Jul 2023 13:00) (15 - 18)  SpO2: 98% (24 Jul 2023 13:00) (98% - 100%)    Parameters below as of 24 Jul 2023 13:00  Patient On (Oxygen Delivery Method): room air        I&O's Summary    23 Jul 2023 07:01  -  24 Jul 2023 07:00  --------------------------------------------------------  IN: 1482 mL / OUT: 1275 mL / NET: 207 mL    24 Jul 2023 07:01  -  24 Jul 2023 14:28  --------------------------------------------------------  IN: 500 mL / OUT: 440 mL / NET: 60 mL      PHYSICAL EXAM:  General:  laying in bed, NAD  Respiratory:  non-labored breathing  Extremity:  left upper extremity fistulogram site c/d/i; proline stitch removed      Assessment: 53 year old male now POD7 s/p renal transplant 7/16. LUE swelling likely secondary to L subclavian thrombus noted on 7/18 duplex. S/p for left radiocephalic fistulogram and subclavian vein angioplasty (7/24).      Plan:  - may resume AC tomorrow  - regular diet   - rest of care per primary team     Vascular Surgery   [id number]

## 2023-07-24 NOTE — BRIEF OPERATIVE NOTE - COMMENTS
Premedicated for contrast allergy per protocol Premedicated for contrast allergy per protocol (approx 70cc contrast used)

## 2023-07-24 NOTE — PROGRESS NOTE ADULT - PROBLEM SELECTOR PLAN 3
Pt. with anemia in the setting of ESRD/DGF. Hgb is low at 7.5 today. Recommend EPO qweekly (last dose: 7/21). Monitor hgb.

## 2023-07-24 NOTE — PROGRESS NOTE ADULT - SUBJECTIVE AND OBJECTIVE BOX
[facility] DIVISION OF KIDNEY DISEASES AND HYPERTENSION -- FOLLOW UP NOTE  --------------------------------------------------------------------------------    HPI: 52 yo M with h/o ESRD on HD x6 years via LUE AVF (outpatient nephrologist: Dr. Bharath Romo), DM2 CAD, CHF, underwent CABG 2015, AICD (2016), and severe PVD (underwent R big toe and second toe amputation in 2021) initially presented for kidney transplant. Pt. underwent DDRT on 7/16. Pt. was transferred to SICU for IV vasopressor support, now off and transferred to medical floor. Post-transplant course significant for DGF. Pt. received first HD treatment on 7/17, last on 7/21.    24 hour events/subjective: Pt. underwent fistyulogram this morning. Pt. was seen and evaluated at bedside this morning. Pt. reports improvement in BL UE and LE edema. LUE swelling around AVF site is improving. Feels well overall. Denies any headaches, fevers/chills, chest pain, abdominal pain, and SOB.    PAST HISTORY  --------------------------------------------------------------------------------  No significant changes to PMH, PSH, FHx, SHx, unless otherwise noted    ALLERGIES & MEDICATIONS  --------------------------------------------------------------------------------  Allergies    Contrast. (Unknown)    Intolerances      Standing Inpatient Medications  aspirin  chewable 81 milliGRAM(s) Oral daily  atorvastatin 40 milliGRAM(s) Oral at bedtime  chlorhexidine 4% Liquid 1 Application(s) Topical <User Schedule>  famotidine    Tablet 20 milliGRAM(s) Oral daily  furosemide   Injectable 80 milliGRAM(s) IV Push two times a day  insulin glargine Injectable (LANTUS) 15 Unit(s) SubCutaneous at bedtime  insulin lispro (ADMELOG) corrective regimen sliding scale   SubCutaneous Before meals and at bedtime  insulin lispro Injectable (ADMELOG) 8 Unit(s) SubCutaneous three times a day before meals  mycophenolate mofetil 1 Gram(s) Oral <User Schedule>  nystatin    Suspension 936350 Unit(s) Swish and Swallow four times a day  predniSONE   Tablet 5 milliGRAM(s) Oral daily  senna 2 Tablet(s) Oral at bedtime  sodium chloride 0.9%. 1000 milliLiter(s) IV Continuous <Continuous>  tacrolimus ER Tablet (ENVARSUS XR) 6 milliGRAM(s) Oral <User Schedule>  trimethoprim   80 mG/sulfamethoxazole 400 mG 1 Tablet(s) Oral daily  valGANciclovir 450 milliGRAM(s) Oral <User Schedule>    PRN Inpatient Medications  traMADol 25 milliGRAM(s) Oral every 6 hours PRN  traMADol 50 milliGRAM(s) Oral every 6 hours PRN      REVIEW OF SYSTEMS  --------------------------------------------------------------------------------  See HPI    VITALS/PHYSICAL EXAM  --------------------------------------------------------------------------------  T(C): 36.3 (07-24-23 @ 11:00), Max: 36.7 (07-24-23 @ 05:00)  HR: 88 (07-24-23 @ 11:30) (84 - 89)  BP: 126/59 (07-24-23 @ 11:30) (96/52 - 155/79)  RR: 16 (07-24-23 @ 11:30) (15 - 18)  SpO2: 100% (07-24-23 @ 11:30) (98% - 100%)  Wt(kg): --  Height (cm): 175.3 (07-24-23 @ 07:41)  Weight (kg): 96.6 (07-24-23 @ 07:41)  BMI (kg/m2): 31.4 (07-24-23 @ 07:41)  BSA (m2): 2.12 (07-24-23 @ 07:41)      07-23-23 @ 07:01  -  07-24-23 @ 07:00  --------------------------------------------------------  IN: 1482 mL / OUT: 1275 mL / NET: 207 mL    07-24-23 @ 07:01  -  07-24-23 @ 12:11  --------------------------------------------------------  IN: 260 mL / OUT: 35 mL / NET: 225 mL    Physical Exam:  Gen: NAD  HEENT: PERRL, supple neck, clear oropharynx  Pulm: CTA B/L  CV: RRR, S1S2;  Abd: +BS, soft, nontender/nondistended  : No suprapubic tenderness  Transplant: Surgical site is c/d/i  Extremities: +BL UE and LE edema (improving)  Skin: Warm, without rashes  Vascular access: +LUE AVF with thrill and bruit appreciated, +swelling around AVF site (improving)    LABS/STUDIES  --------------------------------------------------------------------------------              7.5    5.00  >-----------<  83       [07-24-23 @ 05:06]              22.5     132  |  93  |  89  ----------------------------<  226      [07-24-23 @ 05:06]  4.2   |  24  |  6.30        Ca     8.8     [07-24-23 @ 05:06]      Mg     2.4     [07-24-23 @ 05:06]      Phos  5.6     [07-24-23 @ 05:06]    PT/INR: PT 11.5 , INR 0.99       [07-24-23 @ 05:07]  PTT: 26.7       [07-24-23 @ 05:07]    Creatinine Trend:  SCr 6.30 [07-24 @ 05:06]  SCr 5.58 [07-23 @ 06:32]  SCr 4.45 [07-22 @ 06:56]  SCr 4.88 [07-21 @ 05:26]  SCr 5.24 [07-20 @ 06:35]    Tacrolimus (), Serum: 9.6 ng/mL (07-24 @ 05:06)  Tacrolimus (), Serum: 8.3 ng/mL (07-23 @ 06:33)  Tacrolimus (), Serum: 7.1 ng/mL (07-22 @ 06:56)  Tacrolimus (), Serum: 9.2 ng/mL (07-21 @ 05:26)    HBsAb 5.7      [07-15-23 @ 21:22]  HBsAg Nonreact      [07-15-23 @ 21:21]  HBcAb Nonreact      [07-15-23 @ 21:22]  HCV 0.16, Nonreact      [07-15-23 @ 21:22]  HIV Nonreact      [07-15-23 @ 21:21]

## 2023-07-24 NOTE — PROGRESS NOTE ADULT - PROBLEM SELECTOR PLAN 4
Pt. with hyperphosphatemia in the setting of ESRD/DGF. Serum phosphorus is elevated at 5.6 today. Pt. is receiving Sevelamer 1600 mg tid. Recommend to continue with phosphorus binder. Monitor serum phosphorus, goal: 3.5-5.5,    If you have any questions, please feel free to contact me  Enrique Nagel  Nephrology Fellow  [phone] / [employer] Teams(Preferred)  (After 5pm or on weekends please page the on-call fellow)

## 2023-07-24 NOTE — PROGRESS NOTE ADULT - ASSESSMENT
52 yo M with h/o ESRD on HD x6 years via LUE AVF (outpatient nephrologist: Dr. Bharath Romo), DM2 CAD, CHF, underwent CABG 2015, AICD (2016), and severe PVD (underwent R big toe and second toe amputation in 2021) initially presented for kidney transplant. Pt. underwent DDRT on 7/16. Pt. was transferred to SICU for IV vasopressor support, now off and transferred to medical floor. Post-transplant course significant for DGF. Pt. received first HD treatment on 7/17, last on 7/21. Pt. underwent fistulogram on 7/24 due to swelling of LUE swelling.

## 2023-07-24 NOTE — PROGRESS NOTE ADULT - SUBJECTIVE AND OBJECTIVE BOX
[facility]   MD DANIEL WALDEN MD BRIANA PETITO, NP    TEL:  FROM 9 AM to 5 PM ---OFFICE: [phone]  FROM 5 PM - 9 AM PLEASE CALL ANSWERING SERVICE: [phone]     RENAL PROGRESS NOTE: DATE OF SERVICE 07-24-23 @ 15:44    Patient is a 53y old  Male who presents with a chief complaint of Possible DDRT   Patient seen and examined at bedside. No chest pain/sob    VITALS:  T(F): 97.7 (07-24-23 @ 13:00), Max: 98 (07-24-23 @ 05:00)  HR: 86 (07-24-23 @ 13:00)  BP: 133/76 (07-24-23 @ 13:00)  RR: 18 (07-24-23 @ 13:00)  SpO2: 98% (07-24-23 @ 13:00)  Wt(kg): --    07-23 @ 07:01  -  07-24 @ 07:00  --------------------------------------------------------  IN: 1482 mL / OUT: 1275 mL / NET: 207 mL    07-24 @ 07:01  -  07-24 @ 15:44  --------------------------------------------------------  IN: 680 mL / OUT: 890 mL / NET: -210 mL      Height (cm): 175.3 (07-24 @ 07:41)  Weight (kg): 96.6 (07-24 @ 07:41)  BMI (kg/m2): 31.4 (07-24 @ 07:41)  BSA (m2): 2.12 (07-24 @ 07:41)    PHYSICAL EXAM:  Constitutional: NAD  Neck: No JVD  Respiratory: CTAB, no wheezes, rales or rhonchi  Cardiovascular: S1, S2, RRR  Gastrointestinal: BS+, soft, NT/ND. Surgical incision, LIANA drain in place with ss drainage   Extremities: improving b/l LE edema   Access: LUE AVF - swelling, s/p fistulogram Site c/d/i     Hospital Medications:   MEDICATIONS  (STANDING):  aspirin  chewable 81 milliGRAM(s) Oral daily  atorvastatin 40 milliGRAM(s) Oral at bedtime  chlorhexidine 4% Liquid 1 Application(s) Topical <User Schedule>  famotidine    Tablet 20 milliGRAM(s) Oral daily  insulin glargine Injectable (LANTUS) 15 Unit(s) SubCutaneous at bedtime  insulin lispro (ADMELOG) corrective regimen sliding scale   SubCutaneous Before meals and at bedtime  insulin lispro Injectable (ADMELOG) 8 Unit(s) SubCutaneous three times a day before meals  mycophenolate mofetil 1 Gram(s) Oral <User Schedule>  nystatin    Suspension 686023 Unit(s) Swish and Swallow four times a day  predniSONE   Tablet 5 milliGRAM(s) Oral daily  senna 2 Tablet(s) Oral at bedtime  tacrolimus ER Tablet (ENVARSUS XR) 6 milliGRAM(s) Oral <User Schedule>  trimethoprim   80 mG/sulfamethoxazole 400 mG 1 Tablet(s) Oral daily  valGANciclovir 450 milliGRAM(s) Oral <User Schedule>    Tacrolimus (), Serum: 9.6 ng/mL (07-24 @ 05:06)    LABS:  07-24    132<L>  |  93<L>  |  89<H>  ----------------------------<  226<H>  4.2   |  24  |  6.30<H>    Ca    8.8      24 Jul 2023 05:06  Phos  5.6     07-24  Mg     2.4     07-24      Creatinine Trend: 6.30 <--, 5.58 <--, 4.45 <--, 4.88 <--, 5.24 <--, 7.32 <--, 6.80 <--, 6.45 <--, 5.92 <--, 5.64 <--, 5.33 <--, 4.87 <--    Phosphorus: 5.6 mg/dL (07-24 @ 05:06)                              7.5    5.00  )-----------( 83       ( 24 Jul 2023 05:06 )             22.5     Urine Studies:  Urinalysis - [07-24-23 @ 05:06]      Color  / Appearance  / SG  / pH       Gluc 226 / Ketone   / Bili  / Urobili        Blood  / Protein  / Leuk Est  / Nitrite       RBC  / WBC  / Hyaline  / Gran  / Sq Epi  / Non Sq Epi  / Bacteria         HBsAb 5.7      [07-15-23 @ 21:22]  HBsAg Nonreact      [07-15-23 @ 21:21]  HBcAb Nonreact      [07-15-23 @ 21:22]  HCV 0.16, Nonreact      [07-15-23 @ 21:22]  HIV Nonreact      [07-15-23 @ 21:21]      RADIOLOGY & ADDITIONAL STUDIES:

## 2023-07-24 NOTE — PROGRESS NOTE ADULT - SUBJECTIVE AND OBJECTIVE BOX
[facility] Vascular [facility] Daily Progress Note  =====================================================    SUBJECTIVE: Patient seen and examined at bedside on AM rounds. Patient reports that they're feeling well. NPO for procedure today      ALLERGIES:  Contrast. (Unknown)      --------------------------------------------------------------------------------------    MEDICATIONS:    Neurologic Medications    Respiratory Medications    Cardiovascular Medications    Gastrointestinal Medications    Genitourinary Medications    Hematologic/Oncologic Medications    Antimicrobial/Immunologic Medications    Endocrine/Metabolic Medications    Topical/Other Medications    --------------------------------------------------------------------------------------    VITAL SIGNS:  Vital Signs Last 24 Hrs  T(C): 36.7 (24 Jul 2023 07:41), Max: 36.7 (24 Jul 2023 05:00)  T(F): 98 (24 Jul 2023 05:00), Max: 98 (24 Jul 2023 05:00)  HR: 88 (24 Jul 2023 07:41) (86 - 89)  BP: 139/75 (24 Jul 2023 07:41) (136/67 - 155/79)  BP(mean): 87 (24 Jul 2023 07:41) (87 - 87)  ABP: --  ABP(mean): --  RR: 18 (24 Jul 2023 07:41) (18 - 18)  SpO2: 98% (24 Jul 2023 07:41) (98% - 100%)    O2 Parameters below as of 24 Jul 2023 07:41    O2 Flow (L/min): 2        --------------------------------------------------------------------------------------    Physical Exam:  General: WN/WD NAD  Neurology: A&Ox3, nonfocal, follows commands  Respiratory: nonlabored breathing on room air  CV: hemodynamically stable  Extremities: LUE edematous. Palpable radial pulse. Palpable thrill.    --------------------------------------------------------------------------------------    LABS                          7.5    5.00  )-----------( 83       ( 24 Jul 2023 05:06 )             22.5   07-24    132<L>  |  93<L>  |  89<H>  ----------------------------<  226<H>  4.2   |  24  |  6.30<H>    Ca    8.8      24 Jul 2023 05:06  Phos  5.6     07-24  Mg     2.4     07-24    PT/INR - ( 24 Jul 2023 05:07 )   PT: 11.5 sec;   INR: 0.99 ratio         PTT - ( 24 Jul 2023 05:07 )  PTT:26.7 sec    --------------------------------------------------------------------------------------    INS AND OUTS:    I&O's Detail    23 Jul 2023 07:01  -  24 Jul 2023 07:00  --------------------------------------------------------  IN:    Heparin: 48 mL    Heparin: 104 mL    Oral Fluid: 840 mL    sodium chloride 0.9%: 490 mL  Total IN: 1482 mL    OUT:    Bulb (mL): 175 mL    Voided (mL): 1100 mL  Total OUT: 1275 mL    Total NET: 207 mL    --------------------------------------------------------------------------------------

## 2023-07-24 NOTE — PROGRESS NOTE ADULT - PROBLEM SELECTOR PLAN 1
Pt. with h/o ESRD on HD. Underwent DDRT on 7/16/23. Post-transplant course significant for DGF, pt. received first HD treatment on 7/17, last on 7/21. Pt. is non-oliguric, documented urine output is 1.1L over the past 24 hours on IV Lasix. Pt. clinically stable. Labs reviewed. No acute indication for HD currently. Vascular consult note reviewed: recommending therapeutic AC and elevation. Recommend Lasix 80 mg IV today. Continue with immunosuppression, as outlined below. Pt. will likely require HD following discharge. Monitor labs and urine output. Avoid nephrotoxins. Dose medications as per eGFR.

## 2023-07-25 LAB
ANION GAP SERPL CALC-SCNC: 19 MMOL/L — HIGH (ref 5–17)
APTT BLD: 26 SEC — LOW (ref 27.5–35.5)
BUN SERPL-MCNC: 101 MG/DL — HIGH (ref 7–23)
CALCIUM SERPL-MCNC: 9.8 MG/DL — SIGNIFICANT CHANGE UP (ref 8.4–10.5)
CHLORIDE SERPL-SCNC: 91 MMOL/L — LOW (ref 96–108)
CO2 SERPL-SCNC: 22 MMOL/L — SIGNIFICANT CHANGE UP (ref 22–31)
CREAT SERPL-MCNC: 6.83 MG/DL — HIGH (ref 0.5–1.3)
EGFR: 9 ML/MIN/1.73M2 — LOW
GLUCOSE BLDC GLUCOMTR-MCNC: 219 MG/DL — HIGH (ref 70–99)
GLUCOSE BLDC GLUCOMTR-MCNC: 274 MG/DL — HIGH (ref 70–99)
GLUCOSE BLDC GLUCOMTR-MCNC: 276 MG/DL — HIGH (ref 70–99)
GLUCOSE BLDC GLUCOMTR-MCNC: 336 MG/DL — HIGH (ref 70–99)
GLUCOSE BLDC GLUCOMTR-MCNC: 411 MG/DL — HIGH (ref 70–99)
GLUCOSE SERPL-MCNC: 264 MG/DL — HIGH (ref 70–99)
HCT VFR BLD CALC: 25.6 % — LOW (ref 39–50)
HGB BLD-MCNC: 8.3 G/DL — LOW (ref 13–17)
MAGNESIUM SERPL-MCNC: 2.6 MG/DL — SIGNIFICANT CHANGE UP (ref 1.6–2.6)
MCHC RBC-ENTMCNC: 30.1 PG — SIGNIFICANT CHANGE UP (ref 27–34)
MCHC RBC-ENTMCNC: 32.4 GM/DL — SIGNIFICANT CHANGE UP (ref 32–36)
MCV RBC AUTO: 92.8 FL — SIGNIFICANT CHANGE UP (ref 80–100)
NRBC # BLD: 0 /100 WBCS — SIGNIFICANT CHANGE UP (ref 0–0)
PHOSPHATE SERPL-MCNC: 6.4 MG/DL — HIGH (ref 2.5–4.5)
PLATELET # BLD AUTO: 134 K/UL — LOW (ref 150–400)
POTASSIUM SERPL-MCNC: 4.3 MMOL/L — SIGNIFICANT CHANGE UP (ref 3.5–5.3)
POTASSIUM SERPL-SCNC: 4.3 MMOL/L — SIGNIFICANT CHANGE UP (ref 3.5–5.3)
RBC # BLD: 2.76 M/UL — LOW (ref 4.2–5.8)
RBC # FLD: 17.2 % — HIGH (ref 10.3–14.5)
SODIUM SERPL-SCNC: 132 MMOL/L — LOW (ref 135–145)
TACROLIMUS SERPL-MCNC: 8.2 NG/ML — SIGNIFICANT CHANGE UP
WBC # BLD: 8.42 K/UL — SIGNIFICANT CHANGE UP (ref 3.8–10.5)
WBC # FLD AUTO: 8.42 K/UL — SIGNIFICANT CHANGE UP (ref 3.8–10.5)

## 2023-07-25 PROCEDURE — 99232 SBSQ HOSP IP/OBS MODERATE 35: CPT | Mod: FS

## 2023-07-25 PROCEDURE — 99232 SBSQ HOSP IP/OBS MODERATE 35: CPT | Mod: FS,GC,24

## 2023-07-25 PROCEDURE — 99232 SBSQ HOSP IP/OBS MODERATE 35: CPT | Mod: GC

## 2023-07-25 RX ORDER — SEVELAMER CARBONATE 2400 MG/1
800 POWDER, FOR SUSPENSION ORAL
Refills: 0 | Status: DISCONTINUED | OUTPATIENT
Start: 2023-07-25 | End: 2023-07-28

## 2023-07-25 RX ORDER — INSULIN GLARGINE 100 [IU]/ML
23 INJECTION, SOLUTION SUBCUTANEOUS AT BEDTIME
Refills: 0 | Status: DISCONTINUED | OUTPATIENT
Start: 2023-07-25 | End: 2023-07-28

## 2023-07-25 RX ORDER — DIPHENHYDRAMINE HCL 50 MG
25 CAPSULE ORAL ONCE
Refills: 0 | Status: COMPLETED | OUTPATIENT
Start: 2023-07-25 | End: 2023-07-25

## 2023-07-25 RX ORDER — INSULIN LISPRO 100/ML
10 VIAL (ML) SUBCUTANEOUS
Refills: 0 | Status: DISCONTINUED | OUTPATIENT
Start: 2023-07-25 | End: 2023-07-26

## 2023-07-25 RX ORDER — FUROSEMIDE 40 MG
1 TABLET ORAL
Qty: 60 | Refills: 0
Start: 2023-07-25 | End: 2023-08-23

## 2023-07-25 RX ORDER — HUMAN INSULIN 100 [IU]/ML
5 INJECTION, SUSPENSION SUBCUTANEOUS ONCE
Refills: 0 | Status: COMPLETED | OUTPATIENT
Start: 2023-07-25 | End: 2023-07-25

## 2023-07-25 RX ORDER — INSULIN GLARGINE 100 [IU]/ML
20 INJECTION, SOLUTION SUBCUTANEOUS AT BEDTIME
Refills: 0 | Status: DISCONTINUED | OUTPATIENT
Start: 2023-07-25 | End: 2023-07-25

## 2023-07-25 RX ORDER — FUROSEMIDE 40 MG
80 TABLET ORAL ONCE
Refills: 0 | Status: COMPLETED | OUTPATIENT
Start: 2023-07-25 | End: 2023-07-25

## 2023-07-25 RX ORDER — DEXTROSE 50 % IN WATER 50 %
15 SYRINGE (ML) INTRAVENOUS ONCE
Refills: 0 | Status: DISCONTINUED | OUTPATIENT
Start: 2023-07-25 | End: 2023-07-28

## 2023-07-25 RX ADMIN — Medication 80 MILLIGRAM(S): at 09:22

## 2023-07-25 RX ADMIN — Medication 25 MILLIGRAM(S): at 03:35

## 2023-07-25 RX ADMIN — INSULIN GLARGINE 23 UNIT(S): 100 INJECTION, SOLUTION SUBCUTANEOUS at 21:45

## 2023-07-25 RX ADMIN — Medication 8 UNIT(S): at 09:18

## 2023-07-25 RX ADMIN — Medication 10 UNIT(S): at 13:24

## 2023-07-25 RX ADMIN — SEVELAMER CARBONATE 800 MILLIGRAM(S): 2400 POWDER, FOR SUSPENSION ORAL at 11:59

## 2023-07-25 RX ADMIN — Medication 81 MILLIGRAM(S): at 11:58

## 2023-07-25 RX ADMIN — Medication 10 UNIT(S): at 17:42

## 2023-07-25 RX ADMIN — Medication 5 MILLIGRAM(S): at 06:05

## 2023-07-25 RX ADMIN — HUMAN INSULIN 5 UNIT(S): 100 INJECTION, SUSPENSION SUBCUTANEOUS at 10:20

## 2023-07-25 RX ADMIN — Medication 6: at 13:23

## 2023-07-25 RX ADMIN — Medication 500000 UNIT(S): at 11:58

## 2023-07-25 RX ADMIN — Medication 4: at 17:42

## 2023-07-25 RX ADMIN — MYCOPHENOLATE MOFETIL 1 GRAM(S): 250 CAPSULE ORAL at 20:07

## 2023-07-25 RX ADMIN — TACROLIMUS 6 MILLIGRAM(S): 5 CAPSULE ORAL at 07:42

## 2023-07-25 RX ADMIN — SEVELAMER CARBONATE 800 MILLIGRAM(S): 2400 POWDER, FOR SUSPENSION ORAL at 17:12

## 2023-07-25 RX ADMIN — FAMOTIDINE 20 MILLIGRAM(S): 10 INJECTION INTRAVENOUS at 11:58

## 2023-07-25 RX ADMIN — Medication 1 TABLET(S): at 11:58

## 2023-07-25 RX ADMIN — Medication 8: at 09:17

## 2023-07-25 RX ADMIN — ATORVASTATIN CALCIUM 40 MILLIGRAM(S): 80 TABLET, FILM COATED ORAL at 20:07

## 2023-07-25 RX ADMIN — MYCOPHENOLATE MOFETIL 1 GRAM(S): 250 CAPSULE ORAL at 07:42

## 2023-07-25 RX ADMIN — Medication 6: at 21:46

## 2023-07-25 NOTE — PROGRESS NOTE ADULT - SUBJECTIVE AND OBJECTIVE BOX
[facility] DIVISION OF KIDNEY DISEASES AND HYPERTENSION -- FOLLOW UP NOTE  --------------------------------------------------------------------------------    HPI: 52 yo M with h/o ESRD on HD x6 years via LUE AVF (outpatient nephrologist: Dr. Bharath Romo), DM2 CAD, CHF, underwent CABG 2015, AICD (2016), and severe PVD (underwent R big toe and second toe amputation in 2021) initially presented for kidney transplant. Pt. underwent DDRT on 7/16. Pt. was transferred to SICU for IV vasopressor support, now off and transferred to medical floor. Post-transplant course significant for DGF. Pt. received first HD treatment on 7/17, last on 7/21.    24 hour events/subjective: Pt. was seen and evaluated at bedside this morning. Pt. reports improvement in BL UE and LE edema. LUE swelling around AVF site is improving. Feels well overall. Denies any headaches, fevers/chills, chest pain, abdominal pain, and SOB.      PAST HISTORY  --------------------------------------------------------------------------------  No significant changes to PMH, PSH, FHx, SHx, unless otherwise noted    ALLERGIES & MEDICATIONS  --------------------------------------------------------------------------------  Allergies    Contrast. (Unknown)    Intolerances      Standing Inpatient Medications  aspirin  chewable 81 milliGRAM(s) Oral daily  atorvastatin 40 milliGRAM(s) Oral at bedtime  chlorhexidine 4% Liquid 1 Application(s) Topical <User Schedule>  famotidine    Tablet 20 milliGRAM(s) Oral daily  insulin glargine Injectable (LANTUS) 15 Unit(s) SubCutaneous at bedtime  insulin lispro (ADMELOG) corrective regimen sliding scale   SubCutaneous Before meals and at bedtime  insulin lispro Injectable (ADMELOG) 8 Unit(s) SubCutaneous three times a day before meals  mycophenolate mofetil 1 Gram(s) Oral <User Schedule>  nystatin    Suspension [id number] Unit(s) Swish and Swallow four times a day  predniSONE   Tablet 5 milliGRAM(s) Oral daily  senna 2 Tablet(s) Oral at bedtime  tacrolimus ER Tablet (ENVARSUS XR) 6 milliGRAM(s) Oral <User Schedule>  trimethoprim   80 mG/sulfamethoxazole 400 mG 1 Tablet(s) Oral daily  valGANciclovir 450 milliGRAM(s) Oral <User Schedule>    PRN Inpatient Medications  traMADol 25 milliGRAM(s) Oral every 6 hours PRN  traMADol 50 milliGRAM(s) Oral every 6 hours PRN      REVIEW OF SYSTEMS  --------------------------------------------------------------------------------  See HPI    VITALS/PHYSICAL EXAM  --------------------------------------------------------------------------------  T(C): 36.6 (07-25-23 @ 05:00), Max: 37 (07-24-23 @ 21:09)  HR: 83 (07-25-23 @ 05:00) (81 - 91)  BP: 150/74 (07-25-23 @ 05:00) (96/52 - 150/74)  RR: 18 (07-25-23 @ 05:00) (15 - 18)  SpO2: 100% (07-25-23 @ 05:00) (97% - 100%)  Wt(kg): --  Height (cm): 175.3 (07-24-23 @ 07:41)  Weight (kg): 96.6 (07-24-23 @ 07:41)  BMI (kg/m2): 31.4 (07-24-23 @ 07:41)  BSA (m2): 2.12 (07-24-23 @ 07:41)      07-24-23 @ 07:01  -  07-25-23 @ 07:00  --------------------------------------------------------  IN: 1340 mL / OUT: 1600 mL / NET: -260 mL      Physical Exam:  Gen: NAD  HEENT: PERRL, supple neck, clear oropharynx  Pulm: CTA B/L  CV: RRR, S1S2;  Abd: +BS, soft, nontender/nondistended  : No suprapubic tenderness  Transplant: Surgical site is c/d/i  Extremities: +BL UE and LE edema (improving)  Skin: Warm, without rashes  Vascular access: +LUE AVF with thrill and bruit appreciated, +swelling around AVF site (improving)    LABS/STUDIES  --------------------------------------------------------------------------------              8.3    8.42  >-----------<  134      [07-25-23 @ 06:44]              25.6     132  |  93  |  89  ----------------------------<  226      [07-24-23 @ 05:06]  4.2   |  24  |  6.30        Ca     8.8     [07-24-23 @ 05:06]      Mg     2.4     [07-24-23 @ 05:06]      Phos  5.6     [07-24-23 @ 05:06]      PT/INR: PT 11.5 , INR 0.99       [07-24-23 @ 05:07]  PTT: 26.0       [07-25-23 @ 06:45]    Creatinine Trend:  SCr 6.30 [07-24 @ 05:06]  SCr 5.58 [07-23 @ 06:32]  SCr 4.45 [07-22 @ 06:56]  SCr 4.88 [07-21 @ 05:26]  SCr 5.24 [07-20 @ 06:35]    Tacrolimus (), Serum: 9.6 ng/mL (07-24 @ 05:06)  Tacrolimus (), Serum: 8.3 ng/mL (07-23 @ 06:33)  Tacrolimus (), Serum: 7.1 ng/mL (07-22 @ 06:56)  Tacrolimus (), Serum: 9.2 ng/mL (07-21 @ 05:26)    HBsAb 5.7      [07-15-23 @ 21:22]  HBsAg Nonreact      [07-15-23 @ 21:21]  HBcAb Nonreact      [07-15-23 @ 21:22]  HCV 0.16, Nonreact      [07-15-23 @ 21:22]  HIV Nonreact      [07-15-23 @ 21:21] [facility] DIVISION OF KIDNEY DISEASES AND HYPERTENSION -- FOLLOW UP NOTE  --------------------------------------------------------------------------------    HPI: 54 yo M with h/o ESRD on HD x6 years via LUE AVF (outpatient nephrologist: Dr. Bharath Romo), DM2 CAD, CHF, underwent CABG 2015, AICD (2016), and severe PVD (underwent R big toe and second toe amputation in 2021) initially presented for kidney transplant. Pt. underwent DDRT on 7/16. Pt. was transferred to SICU for IV vasopressor support, now off and transferred to medical floor. Post-transplant course significant for DGF. Pt. received first HD treatment on 7/17, last on 7/21. Pt. underwent fistulogram with angioplasty on 7/24.    24 hour events/subjective: Pt. was seen and evaluated at bedside this morning. Pt. reports improvement in BL UE and LE edema. LUE swelling around AVF site is improving. Feels well overall. Denies any headaches, fevers/chills, chest pain, abdominal pain, and SOB.      PAST HISTORY  --------------------------------------------------------------------------------  No significant changes to PMH, PSH, FHx, SHx, unless otherwise noted    ALLERGIES & MEDICATIONS  --------------------------------------------------------------------------------  Allergies    Contrast. (Unknown)    Intolerances      Standing Inpatient Medications  aspirin  chewable 81 milliGRAM(s) Oral daily  atorvastatin 40 milliGRAM(s) Oral at bedtime  chlorhexidine 4% Liquid 1 Application(s) Topical <User Schedule>  famotidine    Tablet 20 milliGRAM(s) Oral daily  insulin glargine Injectable (LANTUS) 15 Unit(s) SubCutaneous at bedtime  insulin lispro (ADMELOG) corrective regimen sliding scale   SubCutaneous Before meals and at bedtime  insulin lispro Injectable (ADMELOG) 8 Unit(s) SubCutaneous three times a day before meals  mycophenolate mofetil 1 Gram(s) Oral <User Schedule>  nystatin    Suspension [id number] Unit(s) Swish and Swallow four times a day  predniSONE   Tablet 5 milliGRAM(s) Oral daily  senna 2 Tablet(s) Oral at bedtime  tacrolimus ER Tablet (ENVARSUS XR) 6 milliGRAM(s) Oral <User Schedule>  trimethoprim   80 mG/sulfamethoxazole 400 mG 1 Tablet(s) Oral daily  valGANciclovir 450 milliGRAM(s) Oral <User Schedule>    PRN Inpatient Medications  traMADol 25 milliGRAM(s) Oral every 6 hours PRN  traMADol 50 milliGRAM(s) Oral every 6 hours PRN      REVIEW OF SYSTEMS  --------------------------------------------------------------------------------  See HPI    VITALS/PHYSICAL EXAM  --------------------------------------------------------------------------------  T(C): 36.6 (07-25-23 @ 05:00), Max: 37 (07-24-23 @ 21:09)  HR: 83 (07-25-23 @ 05:00) (81 - 91)  BP: 150/74 (07-25-23 @ 05:00) (96/52 - 150/74)  RR: 18 (07-25-23 @ 05:00) (15 - 18)  SpO2: 100% (07-25-23 @ 05:00) (97% - 100%)  Wt(kg): --  Height (cm): 175.3 (07-24-23 @ 07:41)  Weight (kg): 96.6 (07-24-23 @ 07:41)  BMI (kg/m2): 31.4 (07-24-23 @ 07:41)  BSA (m2): 2.12 (07-24-23 @ 07:41)      07-24-23 @ 07:01  -  07-25-23 @ 07:00  --------------------------------------------------------  IN: 1340 mL / OUT: 1600 mL / NET: -260 mL      Physical Exam:  Gen: NAD  HEENT: PERRL, supple neck, clear oropharynx  Pulm: CTA B/L  CV: RRR, S1S2;  Abd: +BS, soft, nontender/nondistended  : No suprapubic tenderness  Transplant: Surgical site is c/d/i  Extremities: +BL UE and LE edema (improving)  Skin: Warm, without rashes  Vascular access: +LUE AVF with thrill and bruit appreciated, +swelling around AVF site (improving)    LABS/STUDIES  --------------------------------------------------------------------------------              8.3    8.42  >-----------<  134      [07-25-23 @ 06:44]              25.6     132  |  93  |  89  ----------------------------<  226      [07-24-23 @ 05:06]  4.2   |  24  |  6.30        Ca     8.8     [07-24-23 @ 05:06]      Mg     2.4     [07-24-23 @ 05:06]      Phos  5.6     [07-24-23 @ 05:06]      PT/INR: PT 11.5 , INR 0.99       [07-24-23 @ 05:07]  PTT: 26.0       [07-25-23 @ 06:45]    Creatinine Trend:  SCr 6.30 [07-24 @ 05:06]  SCr 5.58 [07-23 @ 06:32]  SCr 4.45 [07-22 @ 06:56]  SCr 4.88 [07-21 @ 05:26]  SCr 5.24 [07-20 @ 06:35]    Tacrolimus (), Serum: 9.6 ng/mL (07-24 @ 05:06)  Tacrolimus (), Serum: 8.3 ng/mL (07-23 @ 06:33)  Tacrolimus (), Serum: 7.1 ng/mL (07-22 @ 06:56)  Tacrolimus (), Serum: 9.2 ng/mL (07-21 @ 05:26)    HBsAb 5.7      [07-15-23 @ 21:22]  HBsAg Nonreact      [07-15-23 @ 21:21]  HBcAb Nonreact      [07-15-23 @ 21:22]  HCV 0.16, Nonreact      [07-15-23 @ 21:22]  HIV Nonreact      [07-15-23 @ 21:21]

## 2023-07-25 NOTE — PROGRESS NOTE ADULT - SUBJECTIVE AND OBJECTIVE BOX
Subjective: Patient seen and examined. No new events except as noted.     SUBJECTIVE/ROS:  No chest pain, dyspnea, palpitation, or dizziness.       MEDICATIONS:  MEDICATIONS  (STANDING):  aspirin  chewable 81 milliGRAM(s) Oral daily  atorvastatin 40 milliGRAM(s) Oral at bedtime  chlorhexidine 4% Liquid 1 Application(s) Topical <User Schedule>  famotidine    Tablet 20 milliGRAM(s) Oral daily  insulin glargine Injectable (LANTUS) 20 Unit(s) SubCutaneous at bedtime  insulin lispro (ADMELOG) corrective regimen sliding scale   SubCutaneous Before meals and at bedtime  insulin lispro Injectable (ADMELOG) 10 Unit(s) SubCutaneous three times a day before meals  mycophenolate mofetil 1 Gram(s) Oral <User Schedule>  nystatin    Suspension [id number] Unit(s) Swish and Swallow four times a day  predniSONE   Tablet 5 milliGRAM(s) Oral daily  senna 2 Tablet(s) Oral at bedtime  sevelamer carbonate 800 milliGRAM(s) Oral three times a day with meals  tacrolimus ER Tablet (ENVARSUS XR) 6 milliGRAM(s) Oral <User Schedule>  trimethoprim   80 mG/sulfamethoxazole 400 mG 1 Tablet(s) Oral daily  valGANciclovir 450 milliGRAM(s) Oral <User Schedule>      PHYSICAL EXAM:  T(C): 36.6 (07-25-23 @ 09:00), Max: 37 (07-24-23 @ 21:09)  HR: 81 (07-25-23 @ 09:00) (81 - 91)  BP: 141/66 (07-25-23 @ 09:00) (110/53 - 150/74)  RR: 18 (07-25-23 @ 09:00) (16 - 18)  SpO2: 99% (07-25-23 @ 09:00) (97% - 100%)  Wt(kg): --  I&O's Summary    24 Jul 2023 07:01  -  25 Jul 2023 07:00  --------------------------------------------------------  IN: 1340 mL / OUT: 1600 mL / NET: -260 mL    25 Jul 2023 07:01  -  25 Jul 2023 10:53  --------------------------------------------------------  IN: 240 mL / OUT: 150 mL / NET: 90 mL            JVP: Normal  Neck: supple  Lung: clear   CV: S1 S2 , Murmur:  Abd: soft  Ext: No edema  neuro: Awake / alert  Psych: flat affect  Skin: normal``    LABS/DATA:    CARDIAC MARKERS:                                8.3    8.42  )-----------( 134      ( 25 Jul 2023 06:44 )             25.6     07-25    132<L>  |  91<L>  |  101<H>  ----------------------------<  264<H>  4.3   |  22  |  6.83<H>    Ca    9.8      25 Jul 2023 06:46  Phos  6.4     07-25  Mg     2.6     07-25      proBNP:   Lipid Profile:   HgA1c:   TSH:     TELE:  EKG:

## 2023-07-25 NOTE — PROVIDER CONTACT NOTE (OTHER) - RECOMMENDATIONS
Pause heparin gtt, repeat PTT & CBC?
can send PTT but CBC will not show accurate results. Can send one hour post dialysis if team is okay with that.
any further intervention at this time? can bladder scan again post dialysis to assure bladder is not retaining
administer nph As ordered
any further interventions at this time?
md at bedside to assess. US?
please come reinforce safety precautions and treatment adherence with patient. Patient needs extra layer of reinforcement from provider standpoint.
Contact provider, call security
can patient go to ultrasound off unit?

## 2023-07-25 NOTE — PROVIDER CONTACT NOTE (OTHER) - ACTION/TREATMENT ORDERED:
finding was expected, continue to monitor per protocol
carolina alvarado aware. insulin gtt remains off. sma7 labs sent, results pending. no new orders given@this time
insulin gtt held as ordered.
just continue to monitor will speak with patient, make team aware about any other behavioral outbursts and continue to reinforce safety protocols
CEDRICK DIETZ ordered , md verdin at bedside to assess patient
nph 5units given. will continue to monitor
will put in order, please send patient to ultrasound
SHERIE Peraltaose aware, advised to pause heparin gtt and redraw PTT at 2100. Nursing cares continue
will follow up with appropriate interventions
Security came to floor to attempt to diffuse situation. Situation calm at time of security departure. Pt continued to yell at primary RN after security left, ordered RN to leave room. Cares continue
ok to send one hour post dialysis for accurate level, please send PTT as ordered

## 2023-07-25 NOTE — PROVIDER CONTACT NOTE (OTHER) - REASON
FS=46, repeat FS-92.
FS=99 this hr
patient going for ultrasound, can patient get off tele order?
Pt aggressive, yelling at staff and attempting to throw walker
Pt's urine dark red & bloody, on heparin gtt
+3 edema LUE
patient cbc could not be drawn for 6 hour post heparin gtt start order
patient refusing walker and at high risk for falls
pts fs blood sugar 411at [id number]
patient has not voided since granda removal
patient pTT 25.5

## 2023-07-25 NOTE — PROGRESS NOTE ADULT - PROBLEM SELECTOR PLAN 4
Pt. with hyperphosphatemia in the setting of ESRD/DGF. Serum phosphorus is elevated at 5.6 today. Pt. is receiving Sevelamer 1600 mg tid. Recommend to continue with phosphorus binder. Monitor serum phosphorus, goal: 3.5-5.5,    If you have any questions, please feel free to contact me  Enrique Nagel  Nephrology Fellow  [phone] / [employer] Teams(Preferred)  (After 5pm or on weekends please page the on-call fellow) Pt. with hyperphosphatemia in the setting of ESRD/DGF. Serum phosphorus is elevated at 6.4 today. Pt. is receiving Sevelamer 1600 mg tid. Recommend to continue with phosphorus binder. Low phosphorus diet. Monitor serum phosphorus, goal: 3.5-5.5,    If you have any questions, please feel free to contact me  Enrique Nagel  Nephrology Fellow  [phone] / [employer] Teams(Preferred)  (After 5pm or on weekends please page the on-call fellow)

## 2023-07-25 NOTE — PROGRESS NOTE ADULT - ASSESSMENT
52 yo M with h/o ESRD on HD x6 years via LUE AVF (outpatient nephrologist: Dr. Bharath Romo), DM2 CAD, CHF, underwent CABG 2015, AICD (2016), and severe PVD (underwent R big toe and second toe amputation in 2021) initially presented for kidney transplant. Pt. underwent DDRT on 7/16. Pt. was transferred to SICU for IV vasopressor support, now off and transferred to medical floor. Post-transplant course significant for DGF. Pt. received first HD treatment on 7/17, last on 7/21. Pt. underwent fistulogram on 7/24 due to swelling of LUE swelling. 52 yo M with h/o ESRD on HD x6 years via LUE AVF (outpatient nephrologist: Dr. Bharath Romo), DM2 CAD, CHF, underwent CABG 2015, AICD (2016), and severe PVD (underwent R big toe and second toe amputation in 2021) initially presented for kidney transplant. Pt. underwent DDRT on 7/16. Pt. was transferred to SICU for IV vasopressor support, now off and transferred to medical floor. Post-transplant course significant for DGF. Pt. received first HD treatment on 7/17, last on 7/21. Pt. underwent fistulogram with angioplasty on 7/24.

## 2023-07-25 NOTE — PROGRESS NOTE ADULT - SUBJECTIVE AND OBJECTIVE BOX
SURGERY DAILY PROGRESS NOTE:         SUBJECTIVE/ROS: Patient seen and examined at bedside. States swelling has improved after fistulogram.        MEDICATIONS  (STANDING):  aspirin  chewable 81 milliGRAM(s) Oral daily  atorvastatin 40 milliGRAM(s) Oral at bedtime  chlorhexidine 4% Liquid 1 Application(s) Topical <User Schedule>  famotidine    Tablet 20 milliGRAM(s) Oral daily  insulin glargine Injectable (LANTUS) 15 Unit(s) SubCutaneous at bedtime  insulin lispro (ADMELOG) corrective regimen sliding scale   SubCutaneous Before meals and at bedtime  insulin lispro Injectable (ADMELOG) 8 Unit(s) SubCutaneous three times a day before meals  mycophenolate mofetil 1 Gram(s) Oral <User Schedule>  nystatin    Suspension 408336 Unit(s) Swish and Swallow four times a day  predniSONE   Tablet 5 milliGRAM(s) Oral daily  senna 2 Tablet(s) Oral at bedtime  sevelamer carbonate 800 milliGRAM(s) Oral three times a day with meals  tacrolimus ER Tablet (ENVARSUS XR) 6 milliGRAM(s) Oral <User Schedule>  trimethoprim   80 mG/sulfamethoxazole 400 mG 1 Tablet(s) Oral daily  valGANciclovir 450 milliGRAM(s) Oral <User Schedule>    MEDICATIONS  (PRN):  traMADol 25 milliGRAM(s) Oral every 6 hours PRN Moderate Pain (4 - 6)  traMADol 50 milliGRAM(s) Oral every 6 hours PRN Severe Pain (7 - 10)      OBJECTIVE:    Vital Signs Last 24 Hrs  T(C): 36.6 (25 Jul 2023 09:00), Max: 37 (24 Jul 2023 21:09)  T(F): 97.9 (25 Jul 2023 09:00), Max: 98.6 (24 Jul 2023 21:09)  HR: 81 (25 Jul 2023 09:00) (81 - 91)  BP: 141/66 (25 Jul 2023 09:00) (96/52 - 150/74)  BP(mean): 84 (24 Jul 2023 11:00) (71 - 84)  RR: 18 (25 Jul 2023 09:00) (15 - 18)  SpO2: 99% (25 Jul 2023 09:00) (97% - 100%)    Parameters below as of 25 Jul 2023 09:00  Patient On (Oxygen Delivery Method): room air            I&O's Detail    24 Jul 2023 07:01  -  25 Jul 2023 07:00  --------------------------------------------------------  IN:    Oral Fluid: 1200 mL    sodium chloride 0.9%: 140 mL  Total IN: 1340 mL    OUT:    Bulb (mL): 130 mL    Voided (mL): 1470 mL  Total OUT: 1600 mL    Total NET: -260 mL      25 Jul 2023 07:01  -  25 Jul 2023 09:11  --------------------------------------------------------  IN:    Oral Fluid: 240 mL  Total IN: 240 mL    OUT:    Voided (mL): 150 mL  Total OUT: 150 mL    Total NET: 90 mL      PHYSICAL EXAM:  General:  laying in bed, NAD  Respiratory:  non-labored breathing  Extremity:  left upper extremity AVF with palpable thrill, fistulogram site without active bleeding, swelling improving     LABS:                        8.3    8.42  )-----------( 134      ( 25 Jul 2023 06:44 )             25.6     07-25    132<L>  |  91<L>  |  101<H>  ----------------------------<  264<H>  4.3   |  22  |  6.83<H>    Ca    9.8      25 Jul 2023 06:46  Phos  6.4     07-25  Mg     2.6     07-25      PT/INR - ( 24 Jul 2023 05:07 )   PT: 11.5 sec;   INR: 0.99 ratio         PTT - ( 25 Jul 2023 06:45 )  PTT:26.0 sec  Urinalysis Basic - ( 25 Jul 2023 06:46 )    Color: x / Appearance: x / SG: x / pH: x  Gluc: 264 mg/dL / Ketone: x  / Bili: x / Urobili: x   Blood: x / Protein: x / Nitrite: x   Leuk Esterase: x / RBC: x / WBC x   Sq Epi: x / Non Sq Epi: x / Bacteria: x

## 2023-07-25 NOTE — PROGRESS NOTE ADULT - ASSESSMENT
CAD s/p cabg  stable   asa, statin    chronic systolic chf  stable  improving LV function   resume coreg once deemed safe as moi primary team   off ace/arb/arni for now   s/p ICD  interrogation noted     ESRD  s/p transplant   fu with transplant team

## 2023-07-25 NOTE — PROGRESS NOTE ADULT - SUBJECTIVE AND OBJECTIVE BOX
[facility]   MD DANIEL WALDEN MD BRIANA PETITO, NP    TEL:  FROM 9 AM to 5 PM ---OFFICE: [phone]  FROM 5 PM - 9 AM PLEASE CALL ANSWERING SERVICE: [phone]    RENAL PROGRESS NOTE: DATE OF SERVICE 07-25-23 @ 10:52    Patient is a 53y old  Male who presents with a chief complaint of Possible DDRT   Patient seen and examined at bedside. No chest pain/sob    VITALS:  T(F): 97.9 (07-25-23 @ 09:00), Max: 98.6 (07-24-23 @ 21:09)  HR: 81 (07-25-23 @ 09:00)  BP: 141/66 (07-25-23 @ 09:00)  RR: 18 (07-25-23 @ 09:00)  SpO2: 99% (07-25-23 @ 09:00)  Wt(kg): --    07-24 @ 07:01  -  07-25 @ 07:00  --------------------------------------------------------  IN: 1340 mL / OUT: 1600 mL / NET: -260 mL    07-25 @ 07:01  -  07-25 @ 10:52  --------------------------------------------------------  IN: 240 mL / OUT: 150 mL / NET: 90 mL      PHYSICAL EXAM:  Constitutional: NAD  Neck: No JVD  Respiratory: CTAB, no wheezes, rales or rhonchi  Cardiovascular: S1, S2, RRR  Gastrointestinal: BS+, soft, NT/ND. Surgical incision dressing c/d/i  Extremities: trace b/l LE edema improving   Access: L AVF thrill palpable, bruit auscultates, swelling improving     Hospital Medications:   MEDICATIONS  (STANDING):  aspirin  chewable 81 milliGRAM(s) Oral daily  atorvastatin 40 milliGRAM(s) Oral at bedtime  chlorhexidine 4% Liquid 1 Application(s) Topical <User Schedule>  famotidine    Tablet 20 milliGRAM(s) Oral daily  insulin glargine Injectable (LANTUS) 20 Unit(s) SubCutaneous at bedtime  insulin lispro (ADMELOG) corrective regimen sliding scale   SubCutaneous Before meals and at bedtime  insulin lispro Injectable (ADMELOG) 10 Unit(s) SubCutaneous three times a day before meals  mycophenolate mofetil 1 Gram(s) Oral <User Schedule>  nystatin    Suspension 191983 Unit(s) Swish and Swallow four times a day  predniSONE   Tablet 5 milliGRAM(s) Oral daily  senna 2 Tablet(s) Oral at bedtime  sevelamer carbonate 800 milliGRAM(s) Oral three times a day with meals  tacrolimus ER Tablet (ENVARSUS XR) 6 milliGRAM(s) Oral <User Schedule>  trimethoprim   80 mG/sulfamethoxazole 400 mG 1 Tablet(s) Oral daily  valGANciclovir 450 milliGRAM(s) Oral <User Schedule>    Tacrolimus (), Serum: 8.2 ng/mL (07-25 @ 06:44)    LABS:  07-25    132<L>  |  91<L>  |  101<H>  ----------------------------<  264<H>  4.3   |  22  |  6.83<H>    Ca    9.8      25 Jul 2023 06:46  Phos  6.4     07-25  Mg     2.6     07-25      Creatinine Trend: 6.83 <--, 6.30 <--, 5.58 <--, 4.45 <--, 4.88 <--, 5.24 <--, 7.32 <--, 6.80 <--, 6.45 <--, 5.92 <--    Phosphorus: 6.4 mg/dL (07-25 @ 06:46)                              8.3    8.42  )-----------( 134      ( 25 Jul 2023 06:44 )             25.6     Urine Studies:  Urinalysis - [07-25-23 @ 06:46]      Color  / Appearance  / SG  / pH       Gluc 264 / Ketone   / Bili  / Urobili        Blood  / Protein  / Leuk Est  / Nitrite       RBC  / WBC  / Hyaline  / Gran  / Sq Epi  / Non Sq Epi  / Bacteria         HBsAb 5.7      [07-15-23 @ 21:22]  HBsAg Nonreact      [07-15-23 @ 21:21]  HBcAb Nonreact      [07-15-23 @ 21:22]  HCV 0.16, Nonreact      [07-15-23 @ 21:22]  HIV Nonreact      [07-15-23 @ 21:21]      RADIOLOGY & ADDITIONAL STUDIES:

## 2023-07-25 NOTE — PROGRESS NOTE ADULT - ASSESSMENT
Assessment: 53 year old male now POD7 s/p renal transplant 7/16. LUE swelling likely secondary to L subclavian thrombus noted on 7/18 duplex. S/p for left radiocephalic fistulogram and subclavian vein angioplasty (7/24).      Plan:  - may resume AC today from vascular standpoint  - rest of care per primary team     Vascular Surgery signing off.  Please reconsult as needed.       Vascular Surgery   [id number].

## 2023-07-25 NOTE — PROGRESS NOTE ADULT - ASSESSMENT
52 yo M with hx of DM2 CAD, CHF, s/p CABG 2015, AICD (2016), on hemodialysis for approximately 6 years via L AVF (nephrologist Dr. Bharath Romo), He has hx of PVD, is s/p 4 stents in R leg (mid and distal right superficial femoral artery, right popliteal x2 on 1/14/22).; is sp RT big toe and second toe amputation 2021, on asa and plavix for severe PVD (per pt).   Now here for DDRT.      Plan:  POD#9- L DDRT  DGF---> Last HD 7/21/23, now UOP improving  Lasix 80mg IV BID  Monitor urine output   On aspirin. Holding Plavix for possible biopsy, no more need of Hep gtt of full AC per vascular surgery  Continue diabetic diet.    Immunosuppression:   - Switched from Simulect to Thymoglobulin completed on 7/22/23  - MMF, Methylpred taper, Env by level  - Valcyte to every other day (MWF)   -Envarsus 6 mg QD (FK goal 8-10 ng/dL)  DM:   - Monitor FS, patient now on ISS  - Adjusted pre meal and Lantus, will adjust to goal     PAD:   - Aspirin restarted  - Plavix held     LUE swelling.   - s/p LUE fistulogram with balloon angioplasty.  - Continue Aspirin, no full dose AC required  -Plavix held for possible biopsy of for eval of DGF

## 2023-07-25 NOTE — PROGRESS NOTE ADULT - PROBLEM SELECTOR PLAN 3
Pt. with anemia in the setting of ESRD/DGF. Hgb is low at 7.5 today. Recommend EPO qweekly (last dose: 7/21). Monitor hgb. Pt. with anemia in the setting of ESRD/DGF. Hgb is low at 8.3 today. Recommend EPO qweekly (last dose: 7/21). Monitor hgb.

## 2023-07-25 NOTE — PROVIDER CONTACT NOTE (OTHER) - DATE AND TIME:
17-Jul-2023 09:05
17-Jul-2023 10:00
22-Jul-2023 08:54
18-Jul-2023 06:00
20-Jul-2023 18:47
20-Jul-2023 18:48
20-Jul-2023 16:18
22-Jul-2023 06:05
21-Jul-2023 09:30
21-Jul-2023 19:56
25-Jul-2023 10:20

## 2023-07-25 NOTE — PROVIDER CONTACT NOTE (OTHER) - BACKGROUND
patient s/p DDRT pod4
patient s/p L kidney transplant.
patient admitted s/p DDRT POD#4
patient s/p DDRT POD5
pt s/p  DDRT . post po 9. hyperglycemic
54 y/o M s/p DDRT from 7/16, received HD today. Samuels removed 7/20, no urine since removal. Pt voided 50mL for first time, urine is dark red & bloody.
patient s/p DDRT POD4
patient s/p DDRT POD7
54 y/o M s/p DDRT from 7/16, on heparin gtt at 10 mL/hr

## 2023-07-25 NOTE — PROVIDER CONTACT NOTE (OTHER) - NAME OF MD/NP/PA/DO NOTIFIED:
Chu Osorio NP, Dr. Evelyne REEVES, Dr. Negra REEVES, Dr. Negrito REEVES
SHERIE Brock
Toni REHMAN
He REHMAN
Toni REHMAN
tito hyde
Chu Osorio NP
Toni REHMAN
carolina alvarado
carolina alvarado
md verdin

## 2023-07-25 NOTE — PROGRESS NOTE ADULT - ASSESSMENT
54 yo M with hx of DM2 CAD, CHF, s/p CABG 2015, AICD (2016), on hemodialysis for approximately 6 years via L AVF (nephrologist Dr. Bharath Romo), He has hx of PVD, is s/p 4 stents in R leg (mid and distal right superficial femoral artery, right popliteal x2 on 1/14/22).; is sp RT big toe and second toe amputation 2021, on asa and plavix for severe PVD (per pt). Now here for possible DDRT. Pt reports does not make urine only few drops occasionally. Pt denies N/V/D, fevers/chills, CP, SOB. Pt reports last ate at 4pm today and had HD yesterday 7/14/23.  Nephrology consulted for ESRD s/p DDRT.      ESRD on HD [facility]  Nephrologist : Dr. Thomas / Dr. Brooke at [facility] [place]   s/p DDRT 7/16   s/p HD 7/19- 7/22  s/p Left Radiocephalic fistulogram and subclavian vein angioplasty with Vascular 7/24  HD per transplant team   HD consent in chart.   Renal diet   Monitor     s/p DDRT @ [facility] 7/16/2023   Underwent L DDRT 7/13/2023 s/p Simulect and solumedrol for induction.   Repeat graft US with patent vessels  Management per Transplant  LUE swelling--> LUE swelling likely secondary to L subclavian thrombus noted on 7/18 duplex. S/p for left radiocephalic fistulogram and subclavian vein angioplasty (7/24).   Documented urine output is ~1L over the past 24 hours on IV Lasix. Per Transplant, no acute indication for HD currently. Transplant recommending Lasix 80 mg IV today.   Continue immunosuppressants per transplant : Envarsus 6 mg qd, Cellcept 1 gm BID, and prednisone taper.   Initially received Simulect induction - Changed to Thymoglobulin given delayed graft function. Dose #2 7/22  Valcyte to every other day (MWF) Nystatin, Bactrim renal dose  Check tacrolimus trough 30 minutes prior to AM dose  Tacro level 7/25 is 8.2    Monitor urine output/ sCr -- Per TP, pt likely will need to continue HD upon d/c      Hyperkalemia   in setting of ESRD and recent DDRT with sub-optimally functioning allograft.   s/p HD 7/19 - 7/22  Low K diet   improving   Mgmt per Transplant  Monitor     HTN:   BP fluctuates   Monitor     Hyperphosphatemia  Low PO4 diet  Phoslo switched to sevelamer 1600 mg tid -- continue at present   Monitor PO4 daily

## 2023-07-25 NOTE — PROGRESS NOTE ADULT - PROBLEM SELECTOR PLAN 1
Pt. with h/o ESRD on HD. Underwent DDRT on 7/16/23. Post-transplant course significant for DGF, pt. received first HD treatment on 7/17, last on 7/21. Pt. is non-oliguric, documented urine output is 1.1L over the past 24 hours on IV Lasix. Pt. clinically stable. Labs reviewed. No acute indication for HD currently. Vascular consult note reviewed: recommending therapeutic AC and elevation. Recommend Lasix 80 mg IV today. Continue with immunosuppression, as outlined below. Pt. will likely require HD following discharge. Monitor labs and urine output. Avoid nephrotoxins. Dose medications as per eGFR. Pt. with h/o ESRD on HD. Underwent DDRT on 7/16/23. Post-transplant course significant for DGF, pt. received first HD treatment on 7/17, last on 7/21. Pt. is non-oliguric, documented urine output is ~1.5L over the past 24 hours on IV Lasix. Pt. clinically stable. Labs reviewed. No acute indication for HD currently. Recommend Lasix 80 mg IV today. Continue with immunosuppression, as outlined below. Pt. will likely require HD following discharge. Monitor labs and urine output. Avoid nephrotoxins. Dose medications as per eGFR.

## 2023-07-25 NOTE — PROVIDER CONTACT NOTE (OTHER) - SITUATION
patient pTT 25.5
Pt increasingly aggressive with staff this morning, yelling & attempting to throw walker. Pt refusing to walk with walker, pt unstable on feet. Pt insisting on walking independently.
pts fs blood sugar 411at 1018hrs post breakfast prior giving nph insulin
patient going for ultrasound, can patient get off tele order?
Pt's urine dark red & bloody, on heparin gtt at rate of 9mL/hr
patient cbc could not be drawn for 6 hour post heparin gtt started order
patient has not voided since granda removal
patient refusing walker and at high risk for falls

## 2023-07-25 NOTE — PROGRESS NOTE ADULT - SUBJECTIVE AND OBJECTIVE BOX
Transplant Surgery - Multidisciplinary Progress Note  --------------------------------------------------------------  DDRT 7/16/23 POD # 9    Present: Patient seen and examined with multidisciplinary team including Transplant Surgeon: Dr. Simons. Dr. Calero. Transplant Nephrologist: Dr. Morel, Transplant pharmacist JUDIE Plunkett. PAand unit RN during am rounds.  Disciplines not in attendance will be notified of the plan.     52 yo M with hx of DM2 CAD, CHF, s/p CABG 2015, AICD (2016), on hemodialysis for approximately 6 years via L AVF (nephrologist Dr. Bharath Romo), He has hx of PVD, is s/p 4 stents in R leg (mid and distal right superficial femoral artery, right popliteal x2 on 1/14/22).; is sp RT big toe and second toe amputation 2021, on asa and plavix for severe PVD (per pt).   Now here for DDRT.     Underwent L DDRT 7/15/23 Simulect and solumedrol for induction.   CIT 16hrs, 1A,1V,1U.   Started on insulin drip intraoperatively for hyperkalemia after reperfusion.  Required pressors (Levo - 0.04) intraoperatively and received 2U PRBC.       Interval events:   -S/P HD 7/21/23  -Urine output improving  -Started on laisx 80 IV BID with increased UO 1100ml yesterday, re-ordered for lasix today  -S/P LUE fistulogram with balloon angioplasty of L subclavian, no more AC needed, continues aspirin and holding plavix for now for possible biopsy  -Patient reports feeling well without major complaints. LUE swelling decreasing.       Immunosuppression:  Induction with Thymoglobulin   Maintenance Immunosuppression:  Envarsus 6mg, MMF 1g bid, SST     Ongoing monitoring for signs of rejection.    Potential Discharge date: pending clinical improvement  Education:  Medications  Plan of care:  See Below      T(C): 36.6 (07-25-23 @ 09:00), Max: 37 (07-24-23 @ 21:09)  T(F): 97.9 (07-25-23 @ 09:00), Max: 98.6 (07-24-23 @ 21:09)  HR: 81 (07-25-23 @ 09:00) (81 - 91)  BP: 141/66 (07-25-23 @ 09:00) (110/53 - 150/74)  RR: 18 (07-25-23 @ 09:00) (18 - 18)  SpO2: 99% (07-25-23 @ 09:00) (97% - 100%)    07-24-23 @ 07:01  -  07-25-23 @ 07:00  --------------------------------------------------------  IN:    Oral Fluid: 1200 mL    sodium chloride 0.9%: 140 mL  Total IN: 1340 mL    OUT:    Bulb (mL): 130 mL    Voided (mL): 1470 mL  Total OUT: 1600 mL    Total NET: -260 mL      07-25-23 @ 07:01  -  07-25-23 @ 11:41  --------------------------------------------------------  IN:    Oral Fluid: 240 mL  Total IN: 240 mL    OUT:    Voided (mL): 150 mL  Total OUT: 150 mL    Total NET: 90 mL          aspirin  chewable 81 milliGRAM(s) Oral daily  atorvastatin 40 milliGRAM(s) Oral at bedtime  chlorhexidine 4% Liquid 1 Application(s) Topical <User Schedule>  dextrose Oral Gel 15 Gram(s) Oral once PRN  famotidine    Tablet 20 milliGRAM(s) Oral daily  insulin glargine Injectable (LANTUS) 20 Unit(s) SubCutaneous at bedtime  insulin lispro (ADMELOG) corrective regimen sliding scale   SubCutaneous Before meals and at bedtime  insulin lispro Injectable (ADMELOG) 10 Unit(s) SubCutaneous three times a day before meals  mycophenolate mofetil 1 Gram(s) Oral <User Schedule>  nystatin    Suspension [id number] Unit(s) Swish and Swallow four times a day  predniSONE   Tablet 5 milliGRAM(s) Oral daily  senna 2 Tablet(s) Oral at bedtime  sevelamer carbonate 800 milliGRAM(s) Oral three times a day with meals  tacrolimus ER Tablet (ENVARSUS XR) 6 milliGRAM(s) Oral <User Schedule>  traMADol 25 milliGRAM(s) Oral every 6 hours PRN  traMADol 50 milliGRAM(s) Oral every 6 hours PRN  trimethoprim   80 mG/sulfamethoxazole 400 mG 1 Tablet(s) Oral daily  valGANciclovir 450 milliGRAM(s) Oral <User Schedule>       REVIEW OF SYSTEMS     All other systems were reviewed and are negative.    PHYSICAL EXAM:  GENERAL: NAD, well-groomed, well-developed  HEAD:  Atraumatic, Normocephalic  EYES: EOMI, PERRLA, conjunctiva and sclera clear  NECK: Supple, No JVD, L IJ TLC CDI  CHEST/LUNG: non-labored breathing on room air.   HEART: Regular rate and rhythm; S1/S2  ABDOMEN: Soft, Non-distended, LLQ incision with staples in place healing well. Miles drain with bulb suction SS output, no signs of infection.  : no granda, UOP improved  VASCULAR: Normal pulses, Normal capillary refill  EXTREMITIES:  2+ Peripheral Pulses, No cyanosis, L hand edema resolved, LE edema improving b/l. AVF with + thrill.   SKIN: Warm, Intact

## 2023-07-26 LAB
A1C WITH ESTIMATED AVERAGE GLUCOSE RESULT: 5.8 % — HIGH (ref 4–5.6)
ANION GAP SERPL CALC-SCNC: 18 MMOL/L — HIGH (ref 5–17)
APTT BLD: 25.3 SEC — SIGNIFICANT CHANGE UP (ref 24.5–35.6)
BUN SERPL-MCNC: 121 MG/DL — HIGH (ref 7–23)
CALCIUM SERPL-MCNC: 9 MG/DL — SIGNIFICANT CHANGE UP (ref 8.4–10.5)
CHLORIDE SERPL-SCNC: 95 MMOL/L — LOW (ref 96–108)
CO2 SERPL-SCNC: 23 MMOL/L — SIGNIFICANT CHANGE UP (ref 22–31)
CREAT SERPL-MCNC: 8.03 MG/DL — HIGH (ref 0.5–1.3)
EGFR: 7 ML/MIN/1.73M2 — LOW
ESTIMATED AVERAGE GLUCOSE: 120 MG/DL — HIGH (ref 68–114)
GLUCOSE BLDC GLUCOMTR-MCNC: 129 MG/DL — HIGH (ref 70–99)
GLUCOSE BLDC GLUCOMTR-MCNC: 136 MG/DL — HIGH (ref 70–99)
GLUCOSE BLDC GLUCOMTR-MCNC: 143 MG/DL — HIGH (ref 70–99)
GLUCOSE BLDC GLUCOMTR-MCNC: 165 MG/DL — HIGH (ref 70–99)
GLUCOSE SERPL-MCNC: 105 MG/DL — HIGH (ref 70–99)
HCT VFR BLD CALC: 20.5 % — CRITICAL LOW (ref 39–50)
HCT VFR BLD CALC: 24.7 % — LOW (ref 39–50)
HGB BLD-MCNC: 6.8 G/DL — CRITICAL LOW (ref 13–17)
HGB BLD-MCNC: 7.8 G/DL — LOW (ref 13–17)
MAGNESIUM SERPL-MCNC: 2.6 MG/DL — SIGNIFICANT CHANGE UP (ref 1.6–2.6)
MCHC RBC-ENTMCNC: 30 PG — SIGNIFICANT CHANGE UP (ref 27–34)
MCHC RBC-ENTMCNC: 30.9 PG — SIGNIFICANT CHANGE UP (ref 27–34)
MCHC RBC-ENTMCNC: 31.6 GM/DL — LOW (ref 32–36)
MCHC RBC-ENTMCNC: 33.2 GM/DL — SIGNIFICANT CHANGE UP (ref 32–36)
MCV RBC AUTO: 93.2 FL — SIGNIFICANT CHANGE UP (ref 80–100)
MCV RBC AUTO: 95 FL — SIGNIFICANT CHANGE UP (ref 80–100)
NRBC # BLD: 0 /100 WBCS — SIGNIFICANT CHANGE UP (ref 0–0)
NRBC # BLD: 0 /100 WBCS — SIGNIFICANT CHANGE UP (ref 0–0)
PHOSPHATE SERPL-MCNC: 6.6 MG/DL — HIGH (ref 2.5–4.5)
PLATELET # BLD AUTO: 125 K/UL — LOW (ref 150–400)
PLATELET # BLD AUTO: 145 K/UL — LOW (ref 150–400)
POTASSIUM SERPL-MCNC: 4.4 MMOL/L — SIGNIFICANT CHANGE UP (ref 3.5–5.3)
POTASSIUM SERPL-SCNC: 4.4 MMOL/L — SIGNIFICANT CHANGE UP (ref 3.5–5.3)
RBC # BLD: 2.2 M/UL — LOW (ref 4.2–5.8)
RBC # BLD: 2.6 M/UL — LOW (ref 4.2–5.8)
RBC # FLD: 17.3 % — HIGH (ref 10.3–14.5)
RBC # FLD: 17.5 % — HIGH (ref 10.3–14.5)
SODIUM SERPL-SCNC: 136 MMOL/L — SIGNIFICANT CHANGE UP (ref 135–145)
TACROLIMUS SERPL-MCNC: 5.4 NG/ML — SIGNIFICANT CHANGE UP
WBC # BLD: 10.25 K/UL — SIGNIFICANT CHANGE UP (ref 3.8–10.5)
WBC # BLD: 7.12 K/UL — SIGNIFICANT CHANGE UP (ref 3.8–10.5)
WBC # FLD AUTO: 10.25 K/UL — SIGNIFICANT CHANGE UP (ref 3.8–10.5)
WBC # FLD AUTO: 7.12 K/UL — SIGNIFICANT CHANGE UP (ref 3.8–10.5)

## 2023-07-26 PROCEDURE — 99232 SBSQ HOSP IP/OBS MODERATE 35: CPT | Mod: FS,GC,24

## 2023-07-26 PROCEDURE — 99232 SBSQ HOSP IP/OBS MODERATE 35: CPT | Mod: GC

## 2023-07-26 PROCEDURE — G0452: CPT | Mod: 26

## 2023-07-26 PROCEDURE — 99222 1ST HOSP IP/OBS MODERATE 55: CPT

## 2023-07-26 PROCEDURE — 76776 US EXAM K TRANSPL W/DOPPLER: CPT | Mod: 26,LT

## 2023-07-26 RX ORDER — FUROSEMIDE 40 MG
80 TABLET ORAL ONCE
Refills: 0 | Status: COMPLETED | OUTPATIENT
Start: 2023-07-26 | End: 2023-07-26

## 2023-07-26 RX ORDER — TACROLIMUS 5 MG/1
1 CAPSULE ORAL ONCE
Refills: 0 | Status: COMPLETED | OUTPATIENT
Start: 2023-07-26 | End: 2023-07-26

## 2023-07-26 RX ORDER — TACROLIMUS 5 MG/1
7 CAPSULE ORAL
Refills: 0 | Status: DISCONTINUED | OUTPATIENT
Start: 2023-07-27 | End: 2023-07-27

## 2023-07-26 RX ORDER — INSULIN LISPRO 100/ML
4 VIAL (ML) SUBCUTANEOUS ONCE
Refills: 0 | Status: COMPLETED | OUTPATIENT
Start: 2023-07-26 | End: 2023-07-26

## 2023-07-26 RX ORDER — INSULIN LISPRO 100/ML
6 VIAL (ML) SUBCUTANEOUS
Refills: 0 | Status: DISCONTINUED | OUTPATIENT
Start: 2023-07-26 | End: 2023-07-28

## 2023-07-26 RX ADMIN — Medication 80 MILLIGRAM(S): at 10:06

## 2023-07-26 RX ADMIN — SEVELAMER CARBONATE 800 MILLIGRAM(S): 2400 POWDER, FOR SUSPENSION ORAL at 12:45

## 2023-07-26 RX ADMIN — INSULIN GLARGINE 23 UNIT(S): 100 INJECTION, SOLUTION SUBCUTANEOUS at 22:36

## 2023-07-26 RX ADMIN — MYCOPHENOLATE MOFETIL 1 GRAM(S): 250 CAPSULE ORAL at 19:40

## 2023-07-26 RX ADMIN — SEVELAMER CARBONATE 800 MILLIGRAM(S): 2400 POWDER, FOR SUSPENSION ORAL at 17:25

## 2023-07-26 RX ADMIN — ATORVASTATIN CALCIUM 40 MILLIGRAM(S): 80 TABLET, FILM COATED ORAL at 22:36

## 2023-07-26 RX ADMIN — Medication 1 TABLET(S): at 12:46

## 2023-07-26 RX ADMIN — Medication 500000 UNIT(S): at 12:45

## 2023-07-26 RX ADMIN — Medication 5 MILLIGRAM(S): at 05:30

## 2023-07-26 RX ADMIN — Medication 6 UNIT(S): at 18:04

## 2023-07-26 RX ADMIN — FAMOTIDINE 20 MILLIGRAM(S): 10 INJECTION INTRAVENOUS at 12:45

## 2023-07-26 RX ADMIN — MYCOPHENOLATE MOFETIL 1 GRAM(S): 250 CAPSULE ORAL at 07:56

## 2023-07-26 RX ADMIN — TACROLIMUS 6 MILLIGRAM(S): 5 CAPSULE ORAL at 08:07

## 2023-07-26 RX ADMIN — VALGANCICLOVIR 450 MILLIGRAM(S): 450 TABLET, FILM COATED ORAL at 05:30

## 2023-07-26 RX ADMIN — Medication 500000 UNIT(S): at 17:26

## 2023-07-26 RX ADMIN — Medication 4 UNIT(S): at 10:07

## 2023-07-26 RX ADMIN — SEVELAMER CARBONATE 800 MILLIGRAM(S): 2400 POWDER, FOR SUSPENSION ORAL at 07:57

## 2023-07-26 RX ADMIN — Medication 81 MILLIGRAM(S): at 12:46

## 2023-07-26 RX ADMIN — TACROLIMUS 1 MILLIGRAM(S): 5 CAPSULE ORAL at 12:45

## 2023-07-26 RX ADMIN — Medication 2: at 09:10

## 2023-07-26 NOTE — CONSULT NOTE ADULT - SUBJECTIVE AND OBJECTIVE BOX
Reason For Consult:     HPI:  2 yo M with h/o ESRD on HD x6 years via LUE AVF (outpatient nephrologist: Dr. Bharath Romo), DM2 CAD, CHF, underwent CABG 2015, AICD (2016), and severe PVD (underwent R big toe and second toe amputation in 2021) initially presented for kidney transplant. Pt. underwent DDRT on 7/16. Pt. was transferred to SICU for IV vasopressor support, now off and transferred to medical floor. Post-transplant course significant for DGF. Pt. received first HD treatment on 7/17, last on 7/21. Pt. underwent fistulogram with angioplasty on 7/24.      54 yo M with hx of DM2 CAD, CHF, s/p CABG 2015, AICD (2016), on hemodialysis for approximately 6 years via L AVF (nephrologist Dr. Bharath Romo), He has hx of PVD, is s/p 4 stents in R leg (mid and distal right superficial femoral artery, right popliteal x2 on 1/14/22).; is sp RT big toe and second toe amputation 2021, on asa and plavix for severe PVD (per pt).     Now here for possible DDRT. Pt reports dose not make urine only few drops occasionally. Pt denies N/V/D, fevers/chills, CP, SOB. Pt reports last ate at 4pm today and had HD yesterday 7/14/23.        Home meds:  -Atorvastatin 40mg QD  -[id number]  -Januvia 100mg QD  -Coreg 6.25 BID  -Lisinopril 2.5 QD  -Nanette-bridger 1 tab QD  -Plavix 75 mg QD   (15 Jul 2023 19:08)      PAST MEDICAL & SURGICAL HISTORY:  Diabetes mellitus  type 2      Foot ulcer due to secondary DM      Coronary artery disease      Acute on chronic systolic heart failure      Breast Reduction  at age 18      Toe amputation status, left      S/P CABG (coronary artery bypass graft)      AICD (automatic cardioverter/defibrillator) present          FAMILY HISTORY:  Family history of diabetes mellitus (Father)        Social History:    Outpatient Medications:    MEDICATIONS  (STANDING):  aspirin  chewable 81 milliGRAM(s) Oral daily  atorvastatin 40 milliGRAM(s) Oral at bedtime  chlorhexidine 4% Liquid 1 Application(s) Topical <User Schedule>  famotidine    Tablet 20 milliGRAM(s) Oral daily  insulin glargine Injectable (LANTUS) 23 Unit(s) SubCutaneous at bedtime  insulin lispro (ADMELOG) corrective regimen sliding scale   SubCutaneous Before meals and at bedtime  insulin lispro Injectable (ADMELOG) 6 Unit(s) SubCutaneous three times a day before meals  mycophenolate mofetil 1 Gram(s) Oral <User Schedule>  nystatin    Suspension 141247 Unit(s) Swish and Swallow four times a day  predniSONE   Tablet 5 milliGRAM(s) Oral daily  senna 2 Tablet(s) Oral at bedtime  sevelamer carbonate 800 milliGRAM(s) Oral three times a day with meals  trimethoprim   80 mG/sulfamethoxazole 400 mG 1 Tablet(s) Oral daily  valGANciclovir 450 milliGRAM(s) Oral <User Schedule>    MEDICATIONS  (PRN):  dextrose Oral Gel 15 Gram(s) Oral once PRN Blood Glucose LESS THAN 70 milliGRAM(s)/deciliter  traMADol 25 milliGRAM(s) Oral every 6 hours PRN Moderate Pain (4 - 6)  traMADol 50 milliGRAM(s) Oral every 6 hours PRN Severe Pain (7 - 10)      Allergies    Contrast. (Unknown)    Intolerances      Review of Systems:  Constitutional: No fever  Eyes: No blurry vision  Neuro: No tremors  HEENT: No pain  Cardiovascular: No chest pain, palpitations  Respiratory: No SOB, no cough  GI: No nausea, vomiting, abdominal pain  : No dysuria  Skin: no rash  Psych: no depression  Endocrine: no polyuria, polydipsia  Hem/lymph: no swelling  Osteoporosis: no fractures    ALL OTHER SYSTEMS REVIEWED AND NEGATIVE    UNABLE TO OBTAIN    PHYSICAL EXAM:  VITALS: T(C): 36.4 (07-26-23 @ 13:10)  T(F): 97.5 (07-26-23 @ 13:10), Max: 98.1 (07-26-23 @ 00:49)  HR: 79 (07-26-23 @ 13:10) (63 - 89)  BP: 138/69 (07-26-23 @ 13:10) (112/46 - 159/79)  RR:  (18 - 18)  SpO2:  (98% - 100%)  Wt(kg): --  GENERAL: NAD, well-groomed, well-developed  EYES: No proptosis, no lid lag, anicteric  HEENT:  Atraumatic, Normocephalic, moist mucous membranes  THYROID: Normal size, no palpable nodules  RESPIRATORY: Clear to auscultation bilaterally; No rales, rhonchi, wheezing, or rubs  CARDIOVASCULAR: Regular rate and rhythm; No murmurs; no peripheral edema  GI: Soft, nontender, non distended, normal bowel sounds  SKIN: Dry, intact, No rashes or lesions  MUSCULOSKELETAL: Full range of motion, normal strength  NEURO: sensation intact, extraocular movements intact, no tremor, normal reflexes  PSYCH: Alert and oriented x 3, normal affect, normal mood  CUSHING'S SIGNS: no striae    POCT Blood Glucose.: 165 mg/dL (07-26-23 @ 08:51)  POCT Blood Glucose.: 274 mg/dL (07-25-23 @ 21:07)  POCT Blood Glucose.: 219 mg/dL (07-25-23 @ 16:57)  POCT Blood Glucose.: 276 mg/dL (07-25-23 @ 13:21)  POCT Blood Glucose.: 411 mg/dL (07-25-23 @ 10:18)  POCT Blood Glucose.: 336 mg/dL (07-25-23 @ 08:45)  POCT Blood Glucose.: 288 mg/dL (07-24-23 @ 21:08)  POCT Blood Glucose.: 216 mg/dL (07-24-23 @ 18:12)  POCT Blood Glucose.: 254 mg/dL (07-24-23 @ 12:45)  POCT Blood Glucose.: 251 mg/dL (07-24-23 @ 10:23)  POCT Blood Glucose.: 257 mg/dL (07-24-23 @ 06:41)  POCT Blood Glucose.: 232 mg/dL (07-24-23 @ 04:30)  POCT Blood Glucose.: 186 mg/dL (07-23-23 @ 21:01)  POCT Blood Glucose.: 142 mg/dL (07-23-23 @ 17:22)                            7.8    10.25 )-----------( 145      ( 26 Jul 2023 12:15 )             24.7       07-26    136  |  95<L>  |  121<H>  ----------------------------<  105<H>  4.4   |  23  |  8.03<H>    eGFR: 7<L>    Ca    9.0      07-26  Mg     2.6     07-26  Phos  6.6     07-26         Reason For Consult:     HPI:  54 yo M with h/o ESRD on HD x6 years via LUE AVF (outpatient nephrologist: Dr. Bharath Romo), DM2 CAD, CHF, underwent CABG 2015, AICD (2016), and severe PVD (underwent R big toe and second toe amputation in 2021) initially presented for kidney transplant. Pt. underwent DDRT on 7/16. Pt. was transferred to SICU for IV vasopressor support, now off and transferred to medical floor. Post-transplant course significant for DGF. Pt. received first HD treatment on 7/17, last on 7/21. Pt. under went fistulogram with angioplasty on 7/24.  Endocrinology team consulted for DM management.   He is currently on Lantus 24 U HS and Lispro 6 U TID with meals and BG are in mid to high 200s.     Dx with DM for "a while"  family h/o DM in mother   complications: +CAD, no cva, +ESRD s/p DDRT on 7/16   Follows with PCP, Bharath Newton   Home meds: Januvia 100 mg daily   Diet consist of coffee, boiled egg, guac, greek food, vegetables  No snacks.  Drinks regular juice.      Today, he is tolerating diet. Denies n/v/d/ap.      PAST MEDICAL & SURGICAL HISTORY:  Diabetes mellitus  type 2      Foot ulcer due to secondary DM      Coronary artery disease      Acute on chronic systolic heart failure      Breast Reduction  at age 18      Toe amputation status, left      S/P CABG (coronary artery bypass graft)      AICD (automatic cardioverter/defibrillator) present          FAMILY HISTORY:  Family history of diabetes mellitus (Father)        Social History: denies tobacco, ivda     Outpatient Medications:    MEDICATIONS  (STANDING):  aspirin  chewable 81 milliGRAM(s) Oral daily  atorvastatin 40 milliGRAM(s) Oral at bedtime  chlorhexidine 4% Liquid 1 Application(s) Topical <User Schedule>  famotidine    Tablet 20 milliGRAM(s) Oral daily  insulin glargine Injectable (LANTUS) 23 Unit(s) SubCutaneous at bedtime  insulin lispro (ADMELOG) corrective regimen sliding scale   SubCutaneous Before meals and at bedtime  insulin lispro Injectable (ADMELOG) 6 Unit(s) SubCutaneous three times a day before meals  mycophenolate mofetil 1 Gram(s) Oral <User Schedule>  nystatin    Suspension 623592 Unit(s) Swish and Swallow four times a day  predniSONE   Tablet 5 milliGRAM(s) Oral daily  senna 2 Tablet(s) Oral at bedtime  sevelamer carbonate 800 milliGRAM(s) Oral three times a day with meals  trimethoprim   80 mG/sulfamethoxazole 400 mG 1 Tablet(s) Oral daily  valGANciclovir 450 milliGRAM(s) Oral <User Schedule>    MEDICATIONS  (PRN):  dextrose Oral Gel 15 Gram(s) Oral once PRN Blood Glucose LESS THAN 70 milliGRAM(s)/deciliter  traMADol 25 milliGRAM(s) Oral every 6 hours PRN Moderate Pain (4 - 6)  traMADol 50 milliGRAM(s) Oral every 6 hours PRN Severe Pain (7 - 10)      Allergies    Contrast. (Unknown)    Intolerances      Review of Systems:  Constitutional: No fever  Eyes: No blurry vision  Neuro: No tremors  HEENT: No pain  Cardiovascular: No chest pain, palpitations  Respiratory: No SOB, no cough  GI: No nausea, vomiting, abdominal pain  : No dysuria  Skin: no rash  Psych: no depression  Endocrine: no polyuria, polydipsia  Hem/lymph: no swelling  Osteoporosis: no fractures    ALL OTHER SYSTEMS REVIEWED AND NEGATIVE    PHYSICAL EXAM:  VITALS: T(C): 36.4 (07-26-23 @ 13:10)  T(F): 97.5 (07-26-23 @ 13:10), Max: 98.1 (07-26-23 @ 00:49)  HR: 79 (07-26-23 @ 13:10) (63 - 89)  BP: 138/69 (07-26-23 @ 13:10) (112/46 - 159/79)  RR:  (18 - 18)  SpO2:  (98% - 100%)  Wt(kg): --  GENERAL: NAD, well-groomed, well-developed  EYES: No proptosis, no lid lag, anicteric  HEENT:  Atraumatic, Normocephalic, moist mucous membranes  THYROID: Normal size, no palpable nodules  RESPIRATORY: Clear to auscultation bilaterally; No rales, rhonchi, wheezing, or rubs  CARDIOVASCULAR: Regular rate and rhythm; No murmurs; no peripheral edema  PSYCH: Alert and oriented x 3, normal affect, normal mood  CUSHING'S SIGNS: no striae    POCT Blood Glucose.: 165 mg/dL (07-26-23 @ 08:51)  POCT Blood Glucose.: 274 mg/dL (07-25-23 @ 21:07)  POCT Blood Glucose.: 219 mg/dL (07-25-23 @ 16:57)  POCT Blood Glucose.: 276 mg/dL (07-25-23 @ 13:21)  POCT Blood Glucose.: 411 mg/dL (07-25-23 @ 10:18)  POCT Blood Glucose.: 336 mg/dL (07-25-23 @ 08:45)  POCT Blood Glucose.: 288 mg/dL (07-24-23 @ 21:08)  POCT Blood Glucose.: 216 mg/dL (07-24-23 @ 18:12)  POCT Blood Glucose.: 254 mg/dL (07-24-23 @ 12:45)  POCT Blood Glucose.: 251 mg/dL (07-24-23 @ 10:23)  POCT Blood Glucose.: 257 mg/dL (07-24-23 @ 06:41)  POCT Blood Glucose.: 232 mg/dL (07-24-23 @ 04:30)  POCT Blood Glucose.: 186 mg/dL (07-23-23 @ 21:01)  POCT Blood Glucose.: 142 mg/dL (07-23-23 @ 17:22)                            7.8    10.25 )-----------( 145      ( 26 Jul 2023 12:15 )             24.7       07-26    136  |  95<L>  |  121<H>  ----------------------------<  105<H>  4.4   |  23  |  8.03<H>    eGFR: 7<L>    Ca    9.0      07-26  Mg     2.6     07-26  Phos  6.6     07-26

## 2023-07-26 NOTE — PROGRESS NOTE ADULT - ASSESSMENT
CAD s/p cabg  stable   asa, statin    chronic systolic chf  stable  improving LV function   resume coreg once deemed safe as moi primary team   off ace/arb/arni for now   s/p ICD    ESRD  s/p transplant   fu with transplant team

## 2023-07-26 NOTE — PROGRESS NOTE ADULT - PROBLEM SELECTOR PLAN 1
Pt. with h/o ESRD on HD. Underwent DDRT on 7/16/23. Post-transplant course significant for DGF, pt. received first HD treatment on 7/17, last on 7/21. Pt. is non-oliguric, documented urine output is 810 cc over the past 24 hours on IV Lasix. Pt. clinically stable. Labs reviewed. Scr increased to 8.03 today. Plan for HD treatment today. Continue with immunosuppression, as outlined below. Pt. will likely require HD following discharge. Monitor labs and urine output. Avoid nephrotoxins. Dose medications as per eGFR.

## 2023-07-26 NOTE — CONSULT NOTE ADULT - CONSULT REQUESTED DATE/TIME
17-Jul-2023 09:26
19-Jul-2023 17:23
16-Jul-2023 07:54
16-Jul-2023 13:28
17-Jul-2023 13:00
26-Jul-2023
16-Jul-2023 14:05

## 2023-07-26 NOTE — PROGRESS NOTE ADULT - SUBJECTIVE AND OBJECTIVE BOX
Subjective: Patient seen and examined. No new events except as noted.     SUBJECTIVE/ROS:  nad      MEDICATIONS:  MEDICATIONS  (STANDING):  aspirin  chewable 81 milliGRAM(s) Oral daily  atorvastatin 40 milliGRAM(s) Oral at bedtime  chlorhexidine 4% Liquid 1 Application(s) Topical <User Schedule>  famotidine    Tablet 20 milliGRAM(s) Oral daily  furosemide   Injectable 80 milliGRAM(s) IV Push once  insulin glargine Injectable (LANTUS) 23 Unit(s) SubCutaneous at bedtime  insulin lispro (ADMELOG) corrective regimen sliding scale   SubCutaneous Before meals and at bedtime  insulin lispro Injectable (ADMELOG) 6 Unit(s) SubCutaneous three times a day before meals  insulin lispro Injectable (ADMELOG). 4 Unit(s) SubCutaneous once  metolazone 5 milliGRAM(s) Oral once  mycophenolate mofetil 1 Gram(s) Oral <User Schedule>  nystatin    Suspension 392032 Unit(s) Swish and Swallow four times a day  predniSONE   Tablet 5 milliGRAM(s) Oral daily  senna 2 Tablet(s) Oral at bedtime  sevelamer carbonate 800 milliGRAM(s) Oral three times a day with meals  tacrolimus ER Tablet (ENVARSUS XR) 6 milliGRAM(s) Oral <User Schedule>  trimethoprim   80 mG/sulfamethoxazole 400 mG 1 Tablet(s) Oral daily  valGANciclovir 450 milliGRAM(s) Oral <User Schedule>      PHYSICAL EXAM:  T(C): 36.6 (07-26-23 @ 09:05), Max: 36.7 (07-25-23 @ 21:22)  HR: 63 (07-26-23 @ 09:05) (63 - 89)  BP: 139/74 (07-26-23 @ 09:05) (112/46 - 145/76)  RR: 18 (07-26-23 @ 09:05) (18 - 18)  SpO2: 100% (07-26-23 @ 09:05) (98% - 100%)  Wt(kg): --  I&O's Summary    25 Jul 2023 07:01  -  26 Jul 2023 07:00  --------------------------------------------------------  IN: 1360 mL / OUT: 955 mL / NET: 405 mL    26 Jul 2023 07:01  -  26 Jul 2023 09:17  --------------------------------------------------------  IN: 120 mL / OUT: 30 mL / NET: 90 mL            JVP: Normal  Neck: supple  Lung: clear   CV: S1 S2 , Murmur:  Abd: soft  Ext: No edema  neuro: Awake / alert  Psych: flat affect  Skin: normal``    LABS/DATA:    CARDIAC MARKERS:                                6.8    7.12  )-----------( 125      ( 26 Jul 2023 07:25 )             20.5     07-26    136  |  95<L>  |  121<H>  ----------------------------<  105<H>  4.4   |  23  |  8.03<H>    Ca    9.0      26 Jul 2023 07:03  Phos  6.6     07-26  Mg     2.6     07-26      proBNP:   Lipid Profile:   HgA1c:   TSH:     TELE:  EKG:

## 2023-07-26 NOTE — PROGRESS NOTE ADULT - ASSESSMENT
54 yo M with hx of DM2 CAD, CHF, s/p CABG 2015, AICD (2016), on hemodialysis for approximately 6 years via L AVF (nephrologist Dr. Bharath Romo), He has hx of PVD, is s/p 4 stents in R leg (mid and distal right superficial femoral artery, right popliteal x2 on 1/14/22).; is sp RT big toe and second toe amputation 2021, on asa and plavix for severe PVD (per pt).   Now s/p DDRT on 7/16    s/p DDRT  - Post-op US w/ patent vasculature but patient was more oliguric yesterday so will repeat renal Doppler  - Lasix 80 mg IV x1 dose and metolazine 5 mg PO x1 dose  - DGF requiring hemodialysis 7/17, 7/19, 7/20, 7/21 and will plan for dialysis again today  - H&H low at 6.8/20.5 so will repeat today and if still low, will give 1 unit of PRBCs w/ hemodialysis    Immunosuppression:   - Switched from Simulect to Thymoglobulin, completed on 7/22/23  - Envarsus by level,   - Tacro level 5.4 so increasing Envarsus from 6 mg -> 7 mg daily  - Valcyte every other day (MWF)    DM:   - Monitor FS, patient now on ISS  - Adjusted pre meal and Lantus but still hyperglycemic so will consult endocrine    PAD:   - Aspirin restarted  - Plavix held     LUE swelling.   - s/p LUE fistulogram with balloon angioplasty.  - Continue Aspirin, no full dose AC required  - Plavix held for possible biopsy of for eval of DGF    [id number] 52 yo M with hx of DM2 CAD, CHF, s/p CABG 2015, AICD (2016), on hemodialysis for approximately 6 years via L AVF (nephrologist Dr. Bharath Romo), He has hx of PVD, is s/p 4 stents in R leg (mid and distal right superficial femoral artery, right popliteal x2 on 1/14/22).; is sp RT big toe and second toe amputation 2021, on asa and plavix for severe PVD (per pt).   Now s/p DDRT on 7/16    s/p DDRT  - Post-op US w/ patent vasculature but patient was more oliguric yesterday so will repeat renal Doppler  - Lasix 80 mg IV x1 dose and metolazine 5 mg PO x1 dose  - DGF requiring hemodialysis 7/17, 7/19, 7/20, 7/21 and will plan for dialysis again today  - H&H low at 6.8/20.5 so will repeat today and if still low, will give 1 unit of PRBCs w/ hemodialysis    Immunosuppression:   - Switched from Simulect to Thymoglobulin, completed on 7/22/23  - Envarsus by level,   - Tacro level 5.4 today so increasing Envarsus from 6 mg -> 7 mg daily  - Valcyte every other day (MWF)    DM:   - Monitor FS, patient now on ISS  - Adjusted pre meal and Lantus but still hyperglycemic so will consult endocrine    PAD:   - Aspirin restarted  - Plavix held     LUE swelling.   - s/p LUE fistulogram with balloon angioplasty.  - Continue Aspirin, no full dose AC required  - Plavix held for possible biopsy of for eval of DGF    [id number]

## 2023-07-26 NOTE — CONSULT NOTE ADULT - CONSULT REASON
Cardiac eval
DM
med
s/p DDRT
ESRD s/p DDRT
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
kidney recipient

## 2023-07-26 NOTE — PROGRESS NOTE ADULT - PROBLEM SELECTOR PLAN 4
Pt. with hyperphosphatemia in the setting of ESRD/DGF. Serum phosphorus is elevated at 6.6 today. Pt. is receiving Sevelamer 1600 mg tid. Plan for HD treatment today, as outlined above. Recommend to continue with phosphorus binder. Low phosphorus diet. Monitor serum phosphorus, goal: 3.5-5.5,    If you have any questions, please feel free to contact me  Enrique Nagel  Nephrology Fellow  [phone] / [employer] Teams(Preferred)  (After 5pm or on weekends please page the on-call fellow)

## 2023-07-26 NOTE — PROGRESS NOTE ADULT - ASSESSMENT
52 yo M with hx of DM2 CAD, CHF, s/p CABG 2015, AICD (2016), on hemodialysis for approximately 6 years via L AVF (nephrologist Dr. Bharath Romo), He has hx of PVD, is s/p 4 stents in R leg (mid and distal right superficial femoral artery, right popliteal x2 on 1/14/22).; is sp RT big toe and second toe amputation 2021, on asa and plavix for severe PVD (per pt). Now here for possible DDRT. Pt reports does not make urine only few drops occasionally. Pt denies N/V/D, fevers/chills, CP, SOB. Pt reports last ate at 4pm today and had HD yesterday 7/14/23.  Nephrology consulted for ESRD s/p DDRT.      ESRD on HD [facility]  Nephrologist : Dr. Thomas / Dr. Brooke at [facility] [place]   s/p DDRT 7/16   s/p HD 7/19- 7/22  s/p Left Radiocephalic fistulogram and subclavian vein angioplasty with Vascular 7/24  HD per transplant team --> Plan for HD today. Per TP, will likely need to continue HD as outpatient.   HD consent in chart.   Renal diet   Monitor     s/p DDRT @ [facility] 7/16/2023   Underwent L DDRT 7/13/2023 s/p Simulect and solumedrol for induction.   Repeat graft US with patent vessels  Management per Transplant  LUE swelling--> LUE swelling likely secondary to L subclavian thrombus noted on 7/18 duplex. S/p for left radiocephalic fistulogram and subclavian vein angioplasty (7/24).   Documented urine output is ~810cc over the past 24 hours on IV Lasix   sCr worsening at present   Plan for HD today   Continue immunosuppressants per transplant : Envarsus 7 mg qd, Cellcept 1 gm BID, and prednisone taper.   Initially received Simulect induction - Changed to Thymoglobulin given delayed graft function. Dose #2 7/22  Valcyte to every other day (MWF) Nystatin, Bactrim renal dose  Check tacrolimus trough 30 minutes prior to AM dose  Tacro level 7/26 is 5.4    Monitor urine output/ sCr -- Per TP, pt likely will need to continue HD upon d/c    Anemia  plan for prbc today  Maintain Hgb > 8  On LUX  Monitor     Hyperkalemia   in setting of ESRD and recent DDRT with sub-optimally functioning allograft.   s/p HD 7/19 - 7/22  Low K diet   improving   Mgmt per Transplant  Monitor     HTN:   BP fluctuates   Monitor     Hyperphosphatemia  Low PO4 diet  Phoslo switched to sevelamer 1600 mg tid -- continue at present   Monitor PO4 daily

## 2023-07-26 NOTE — PROGRESS NOTE ADULT - SUBJECTIVE AND OBJECTIVE BOX
[facility]   MD DANIEL WALDEN MD BRIANA PETITO, NP    TEL:  FROM 9 AM to 5 PM ---OFFICE: [phone]  FROM 5 PM - 9 AM PLEASE CALL ANSWERING SERVICE: [phone]     RENAL PROGRESS NOTE: DATE OF SERVICE 07-26-23 @ 16:14  Patient is a 53y old  Male who presents with a chief complaint of Possible DDRT     Patient seen and examined at bedside. No chest pain/sob    VITALS:  T(F): 97.5 (07-26-23 @ 13:10), Max: 98.1 (07-26-23 @ 00:49)  HR: 79 (07-26-23 @ 13:10)  BP: 138/69 (07-26-23 @ 13:10)  RR: 18 (07-26-23 @ 13:10)  SpO2: 100% (07-26-23 @ 13:10)  Wt(kg): --    07-25 @ 07:01  -  07-26 @ 07:00  --------------------------------------------------------  IN: 1360 mL / OUT: 955 mL / NET: 405 mL    07-26 @ 07:01  -  07-26 @ 16:14  --------------------------------------------------------  IN: 240 mL / OUT: 2630 mL / NET: -2390 mL      PHYSICAL EXAM:  Constitutional: NAD  Neck: No JVD  Respiratory: CTAB, no wheezes, rales or rhonchi  Cardiovascular: S1, S2, RRR  Gastrointestinal: BS+, soft, NT/ND  Extremities: No peripheral edema  Access: L AVF palpable thrill, bruit auscultated     Hospital Medications:   MEDICATIONS  (STANDING):  aspirin  chewable 81 milliGRAM(s) Oral daily  atorvastatin 40 milliGRAM(s) Oral at bedtime  chlorhexidine 4% Liquid 1 Application(s) Topical <User Schedule>  famotidine    Tablet 20 milliGRAM(s) Oral daily  insulin glargine Injectable (LANTUS) 23 Unit(s) SubCutaneous at bedtime  insulin lispro (ADMELOG) corrective regimen sliding scale   SubCutaneous Before meals and at bedtime  insulin lispro Injectable (ADMELOG) 6 Unit(s) SubCutaneous three times a day before meals  mycophenolate mofetil 1 Gram(s) Oral <User Schedule>  nystatin    Suspension [id number] Unit(s) Swish and Swallow four times a day  predniSONE   Tablet 5 milliGRAM(s) Oral daily  senna 2 Tablet(s) Oral at bedtime  sevelamer carbonate 800 milliGRAM(s) Oral three times a day with meals  trimethoprim   80 mG/sulfamethoxazole 400 mG 1 Tablet(s) Oral daily  valGANciclovir 450 milliGRAM(s) Oral <User Schedule>    Tacrolimus (), Serum: 5.4 ng/mL (07-26 @ 07:00)    LABS:  07-26    136  |  95<L>  |  121<H>  ----------------------------<  105<H>  4.4   |  23  |  8.03<H>    Ca    9.0      26 Jul 2023 07:03  Phos  6.6     07-26  Mg     2.6     07-26      Creatinine Trend: 8.03 <--, 6.83 <--, 6.30 <--, 5.58 <--, 4.45 <--, 4.88 <--, 5.24 <--    Phosphorus: 6.6 mg/dL (07-26 @ 07:03)                              7.8    10.25 )-----------( 145      ( 26 Jul 2023 12:15 )             24.7     Urine Studies:  Urinalysis - [07-26-23 @ 07:03]      Color  / Appearance  / SG  / pH       Gluc 105 / Ketone   / Bili  / Urobili        Blood  / Protein  / Leuk Est  / Nitrite       RBC  / WBC  / Hyaline  / Gran  / Sq Epi  / Non Sq Epi  / Bacteria         HBsAb 5.7      [07-15-23 @ 21:22]  HBsAg Nonreact      [07-15-23 @ 21:21]  HBcAb Nonreact      [07-15-23 @ 21:22]  HCV 0.16, Nonreact      [07-15-23 @ 21:22]  HIV Nonreact      [07-15-23 @ 21:21]      RADIOLOGY & ADDITIONAL STUDIES:

## 2023-07-26 NOTE — PROGRESS NOTE ADULT - ASSESSMENT
52 yo M with h/o ESRD on HD x6 years via LUE AVF (outpatient nephrologist: Dr. Bharath Romo), DM2 CAD, CHF, underwent CABG 2015, AICD (2016), and severe PVD (underwent R big toe and second toe amputation in 2021) initially presented for kidney transplant. Pt. underwent DDRT on 7/16. Pt. was transferred to SICU for IV vasopressor support, now off and transferred to medical floor. Post-transplant course significant for DGF. Pt. received first HD treatment on 7/17, last on 7/21. Pt. underwent fistulogram with angioplasty on 7/24.

## 2023-07-26 NOTE — PHARMACY EDUCATION NOTE - EDUCATION SUMMARY
Discharge immunosuppressant medications and prophylatic anti-infective agents reviewed with the patient. Outpatient medication schedule was discussed in detail including: medication name, indication, dose, administration times, treatment duration, side effects, drug interactions, and special instructions. Patient questions and concerns were answered and addressed. Patient demonstrated understanding. Stent education was provided to patient.

## 2023-07-26 NOTE — PROGRESS NOTE ADULT - SUBJECTIVE AND OBJECTIVE BOX
[facility] DIVISION OF KIDNEY DISEASES AND HYPERTENSION -- FOLLOW UP NOTE  --------------------------------------------------------------------------------    HPI: 52 yo M with h/o ESRD on HD x6 years via LUE AVF (outpatient nephrologist: Dr. Bharath Romo), DM2 CAD, CHF, underwent CABG 2015, AICD (2016), and severe PVD (underwent R big toe and second toe amputation in 2021) initially presented for kidney transplant. Pt. underwent DDRT on 7/16. Pt. was transferred to SICU for IV vasopressor support, now off and transferred to medical floor. Post-transplant course significant for DGF. Pt. received first HD treatment on 7/17, last on 7/21. Pt. underwent fistulogram with angioplasty on 7/24.    24 hour events/subjective: Pt. was seen and evaluated at bedside this morning. Pt. reports improvement in BL UE and LE edema. LUE swelling around AVF site improved. Denies any headaches, fevers/chills, chest pain, abdominal pain, and SOB.      PAST HISTORY  --------------------------------------------------------------------------------  No significant changes to PMH, PSH, FHx, SHx, unless otherwise noted    ALLERGIES & MEDICATIONS  --------------------------------------------------------------------------------  Allergies    Contrast. (Unknown)    Intolerances      Standing Inpatient Medications  aspirin  chewable 81 milliGRAM(s) Oral daily  atorvastatin 40 milliGRAM(s) Oral at bedtime  chlorhexidine 4% Liquid 1 Application(s) Topical <User Schedule>  famotidine    Tablet 20 milliGRAM(s) Oral daily  insulin glargine Injectable (LANTUS) 23 Unit(s) SubCutaneous at bedtime  insulin lispro (ADMELOG) corrective regimen sliding scale   SubCutaneous Before meals and at bedtime  insulin lispro Injectable (ADMELOG) 6 Unit(s) SubCutaneous three times a day before meals  mycophenolate mofetil 1 Gram(s) Oral <User Schedule>  nystatin    Suspension 082269 Unit(s) Swish and Swallow four times a day  predniSONE   Tablet 5 milliGRAM(s) Oral daily  senna 2 Tablet(s) Oral at bedtime  sevelamer carbonate 800 milliGRAM(s) Oral three times a day with meals  tacrolimus ER Tablet (ENVARSUS XR) 1 milliGRAM(s) Oral once  trimethoprim   80 mG/sulfamethoxazole 400 mG 1 Tablet(s) Oral daily  valGANciclovir 450 milliGRAM(s) Oral <User Schedule>    PRN Inpatient Medications  dextrose Oral Gel 15 Gram(s) Oral once PRN  traMADol 50 milliGRAM(s) Oral every 6 hours PRN  traMADol 25 milliGRAM(s) Oral every 6 hours PRN      REVIEW OF SYSTEMS  --------------------------------------------------------------------------------  See HPI    VITALS/PHYSICAL EXAM  --------------------------------------------------------------------------------  T(C): 36.6 (07-26-23 @ 09:05), Max: 36.7 (07-25-23 @ 21:22)  HR: 63 (07-26-23 @ 09:05) (63 - 89)  BP: 139/74 (07-26-23 @ 09:05) (112/46 - 145/76)  RR: 18 (07-26-23 @ 09:05) (18 - 18)  SpO2: 100% (07-26-23 @ 09:05) (98% - 100%)  Wt(kg): --    07-25-23 @ 07:01  -  07-26-23 @ 07:00  --------------------------------------------------------  IN: 1360 mL / OUT: 955 mL / NET: 405 mL    07-26-23 @ 07:01  -  07-26-23 @ 12:26  --------------------------------------------------------  IN: 120 mL / OUT: 230 mL / NET: -110 mL    Physical Exam:  Gen: NAD  HEENT: PERRL, supple neck, clear oropharynx  Pulm: CTA B/L  CV: RRR, S1S2;  Abd: +BS, soft, nontender/nondistended  : No suprapubic tenderness  Transplant: Surgical site is c/d/i  Extremities: +BL UE and LE edema (improving)  Skin: Warm, without rashes  Vascular access: +LUE AVF with thrill and bruit appreciated, +swelling around AVF site (improving)    LABS/STUDIES  --------------------------------------------------------------------------------              6.8    7.12  >-----------<  125      [07-26-23 @ 07:25]              20.5     136  |  95  |  121  ----------------------------<  105      [07-26-23 @ 07:03]  4.4   |  23  |  8.03        Ca     9.0     [07-26-23 @ 07:03]      Mg     2.6     [07-26-23 @ 07:03]      Phos  6.6     [07-26-23 @ 07:03]      PTT: 25.3       [07-26-23 @ 07:01]    Creatinine Trend:  SCr 8.03 [07-26 @ 07:03]  SCr 6.83 [07-25 @ 06:46]  SCr 6.30 [07-24 @ 05:06]  SCr 5.58 [07-23 @ 06:32]  SCr 4.45 [07-22 @ 06:56]    Tacrolimus (), Serum: 5.4 ng/mL (07-26 @ 07:00)  Tacrolimus (), Serum: 8.2 ng/mL (07-25 @ 06:44)  Tacrolimus (), Serum: 9.6 ng/mL (07-24 @ 05:06)  Tacrolimus (), Serum: 8.3 ng/mL (07-23 @ 06:33)    HBsAb 5.7      [07-15-23 @ 21:22]  HBsAg Nonreact      [07-15-23 @ 21:21]  HBcAb Nonreact      [07-15-23 @ 21:22]  HCV 0.16, Nonreact      [07-15-23 @ 21:22]  HIV Nonreact      [07-15-23 @ 21:21]

## 2023-07-26 NOTE — PROGRESS NOTE ADULT - SUBJECTIVE AND OBJECTIVE BOX
Transplant Surgery - Multidisciplinary Rounds  --------------------------------------------------------------  DDRT    Date: 07/16/2023     POD#10    Patient seen and examined with multidisciplinary team including transplant surgeon Dr. Simons, transplant nephrologist Dr. Charlie Corado, pharmacist Jorge Alberto Plunkett, Erna Miranda, and unit JARROD Lang during AM rounds. Disciplines not in attendance will be notified of the plan.    Interval Events:  - Lantus increased from 20 -> 23 units at bedtime and pre-meal increased from 8 -> 10 units  - Started sevelamer 800 mg PO TID for hyperphosphatemia  - Lasix 80 mg IV x1 dose    Immunosuppression:  Induction: Simulect -> thymoglobulin  Maintenance immunosuppression: Envarsus 6 mg PO daily, prednisone 5 mg PO daily, MMF 1 g PO BID  Ongoing monitoring for signs of rejection.    Potential Discharge date: TBD    MEDICATIONS  (STANDING):  aspirin  chewable 81 milliGRAM(s) Oral daily  atorvastatin 40 milliGRAM(s) Oral at bedtime  chlorhexidine 4% Liquid 1 Application(s) Topical <User Schedule>  famotidine    Tablet 20 milliGRAM(s) Oral daily  insulin glargine Injectable (LANTUS) 23 Unit(s) SubCutaneous at bedtime  insulin lispro (ADMELOG) corrective regimen sliding scale   SubCutaneous Before meals and at bedtime  insulin lispro Injectable (ADMELOG) 6 Unit(s) SubCutaneous three times a day before meals  mycophenolate mofetil 1 Gram(s) Oral <User Schedule>  nystatin    Suspension 204375 Unit(s) Swish and Swallow four times a day  predniSONE   Tablet 5 milliGRAM(s) Oral daily  senna 2 Tablet(s) Oral at bedtime  sevelamer carbonate 800 milliGRAM(s) Oral three times a day with meals  tacrolimus ER Tablet (ENVARSUS XR) 6 milliGRAM(s) Oral <User Schedule>  trimethoprim   80 mG/sulfamethoxazole 400 mG 1 Tablet(s) Oral daily  valGANciclovir 450 milliGRAM(s) Oral <User Schedule>    MEDICATIONS  (PRN):  dextrose Oral Gel 15 Gram(s) Oral once PRN Blood Glucose LESS THAN 70 milliGRAM(s)/deciliter  traMADol 25 milliGRAM(s) Oral every 6 hours PRN Moderate Pain (4 - 6)  traMADol 50 milliGRAM(s) Oral every 6 hours PRN Severe Pain (7 - 10)      PAST MEDICAL & SURGICAL HISTORY:  Diabetes mellitus type 2  Foot ulcer due to secondary DM  Coronary artery disease  Acute on chronic systolic heart failure  Breast Reduction at age 18  Toe amputation status, left  S/P CABG (coronary artery bypass graft)  AICD (automatic cardioverter/defibrillator) present    Vital Signs Last 24 Hrs  T(C): 36.6 (26 Jul 2023 09:05), Max: 36.7 (25 Jul 2023 21:22)  T(F): 97.9 (26 Jul 2023 09:05), Max: 98.1 (26 Jul 2023 00:49)  HR: 63 (26 Jul 2023 09:05) (63 - 89)  BP: 139/74 (26 Jul 2023 09:05) (112/46 - 145/76)  RR: 18 (26 Jul 2023 09:05) (18 - 18)  SpO2: 100% (26 Jul 2023 09:05) (98% - 100%)    Parameters below as of 26 Jul 2023 09:05  Patient On (Oxygen Delivery Method): room air    I&O's Summary    25 Jul 2023 07:01  -  26 Jul 2023 07:00  --------------------------------------------------------  IN: 1360 mL / OUT: 955 mL / NET: 405 mL    26 Jul 2023 07:01  -  26 Jul 2023 11:33  --------------------------------------------------------  IN: 120 mL / OUT: 230 mL / NET: -110 mL                        6.8    7.12  )-----------( 125      ( 26 Jul 2023 07:25 )             20.5     136  |  95<L>  |  121<H>  ----------------------------<  105<H>  4.4   |  23  |  8.03<H>    Ca    9.0      26 Jul 2023 07:03  Phos  6.6  Mg     2.6    Tacrolimus (), Serum: 5.4 ng/mL (07-26 @ 07:00)    Review of systems  Gen: No weight changes, fatigue, fevers/chills, weakness  Skin: No rashes  Head/Eyes/Ears/Mouth: No headache; Normal hearing; Normal vision w/o blurriness; No sinus pain/discomfort, sore throat  Respiratory: No dyspnea, cough, wheezing, hemoptysis  CV: No chest pain, PND, orthopnea  GI: Mild abdominal pain at surgical incision site; denies diarrhea, constipation, nausea, vomiting, melena, hematochezia  : No increased frequency, dysuria, hematuria, nocturia  MSK: No joint pain/swelling; no back pain; no edema  Neuro: No dizziness/lightheadedness, weakness, seizures, numbness, tingling  Heme: No easy bruising or bleeding  Endo: No heat/cold intolerance  Psych: No significant nervousness, anxiety, stress, depression  All other systems were reviewed and are negative, except as noted.    PHYSICAL EXAM:  Constitutional: Well developed / well nourished  Eyes: Anicteric, PERRLA  ENMT: nc/at  Neck: supple  Respiratory: CTA B/L  Cardiovascular: RRR  Gastrointestinal: Soft, non distended, mild tenderness at the incision site; incision c/d/i; LIANA .....   Genitourinary: Urinary catheter in place*****Voiding spontaneously  Extremities: SCD's in place and working bilaterally, AVF.....  Vascular: Palpable dp pulses bilaterally  Neurological: A&O x3  Skin: no rashes, ulcerations or lesions;  Musculoskeletal: Moving all extremities  Psychiatric: Responsive Transplant Surgery - Multidisciplinary Rounds  --------------------------------------------------------------  DDRT    Date: 07/16/2023     POD#10    Patient seen and examined with multidisciplinary team including transplant surgeon Dr. Simons, transplant nephrologist Dr. Charlie Corado, pharmacist Jorge Alberto Plunkett, Erna Miranda, and unit JARROD Lang during AM rounds. Disciplines not in attendance will be notified of the plan.    Interval Events:  - Lantus increased from 20 -> 23 units at bedtime and pre-meal increased from 8 -> 10 units  - Started sevelamer 800 mg PO TID for hyperphosphatemia  - Lasix 80 mg IV x1 dose    Immunosuppression:  Induction: Simulect -> thymoglobulin  Maintenance immunosuppression: Envarsus 6 mg PO daily, prednisone 5 mg PO daily, MMF 1 g PO BID  Ongoing monitoring for signs of rejection.    Potential Discharge date: TBD    MEDICATIONS  (STANDING):  aspirin  chewable 81 milliGRAM(s) Oral daily  atorvastatin 40 milliGRAM(s) Oral at bedtime  chlorhexidine 4% Liquid 1 Application(s) Topical <User Schedule>  famotidine    Tablet 20 milliGRAM(s) Oral daily  insulin glargine Injectable (LANTUS) 23 Unit(s) SubCutaneous at bedtime  insulin lispro (ADMELOG) corrective regimen sliding scale   SubCutaneous Before meals and at bedtime  insulin lispro Injectable (ADMELOG) 6 Unit(s) SubCutaneous three times a day before meals  mycophenolate mofetil 1 Gram(s) Oral <User Schedule>  nystatin    Suspension 283196 Unit(s) Swish and Swallow four times a day  predniSONE   Tablet 5 milliGRAM(s) Oral daily  senna 2 Tablet(s) Oral at bedtime  sevelamer carbonate 800 milliGRAM(s) Oral three times a day with meals  tacrolimus ER Tablet (ENVARSUS XR) 6 milliGRAM(s) Oral <User Schedule>  trimethoprim   80 mG/sulfamethoxazole 400 mG 1 Tablet(s) Oral daily  valGANciclovir 450 milliGRAM(s) Oral <User Schedule>    MEDICATIONS  (PRN):  dextrose Oral Gel 15 Gram(s) Oral once PRN Blood Glucose LESS THAN 70 milliGRAM(s)/deciliter  traMADol 25 milliGRAM(s) Oral every 6 hours PRN Moderate Pain (4 - 6)  traMADol 50 milliGRAM(s) Oral every 6 hours PRN Severe Pain (7 - 10)      PAST MEDICAL & SURGICAL HISTORY:  Diabetes mellitus type 2  Foot ulcer due to secondary DM  Coronary artery disease  Acute on chronic systolic heart failure  Breast Reduction at age 18  Toe amputation status, left  S/P CABG (coronary artery bypass graft)  AICD (automatic cardioverter/defibrillator) present    Vital Signs Last 24 Hrs  T(C): 36.6 (26 Jul 2023 09:05), Max: 36.7 (25 Jul 2023 21:22)  T(F): 97.9 (26 Jul 2023 09:05), Max: 98.1 (26 Jul 2023 00:49)  HR: 63 (26 Jul 2023 09:05) (63 - 89)  BP: 139/74 (26 Jul 2023 09:05) (112/46 - 145/76)  RR: 18 (26 Jul 2023 09:05) (18 - 18)  SpO2: 100% (26 Jul 2023 09:05) (98% - 100%)    Parameters below as of 26 Jul 2023 09:05  Patient On (Oxygen Delivery Method): room air    I&O's Summary    25 Jul 2023 07:01  -  26 Jul 2023 07:00  --------------------------------------------------------  IN: 1360 mL / OUT: 955 mL / NET: 405 mL    26 Jul 2023 07:01  -  26 Jul 2023 11:33  --------------------------------------------------------  IN: 120 mL / OUT: 230 mL / NET: -110 mL                        6.8    7.12  )-----------( 125      ( 26 Jul 2023 07:25 )             20.5     136  |  95<L>  |  121<H>  ----------------------------<  105<H>  4.4   |  23  |  8.03<H>    Ca    9.0      26 Jul 2023 07:03  Phos  6.6  Mg     2.6    Tacrolimus (), Serum: 5.4 ng/mL (07-26 @ 07:00)    Review of systems  Gen: No weight changes, fatigue, fevers/chills, weakness  Skin: No rashes  Head/Eyes/Ears/Mouth: No headache; Normal hearing; Normal vision w/o blurriness; No sinus pain/discomfort, sore throat  Respiratory: No dyspnea, cough, wheezing, hemoptysis  CV: No chest pain, PND, orthopnea  GI: Mild abdominal pain at surgical incision site; denies diarrhea, constipation, nausea, vomiting, melena, hematochezia  : No increased frequency, dysuria, hematuria, nocturia  MSK: No joint pain/swelling; no back pain; no edema  Neuro: No dizziness/lightheadedness, weakness, seizures, numbness, tingling  Heme: No easy bruising or bleeding  Endo: No heat/cold intolerance  Psych: No significant nervousness, anxiety, stress, depression  All other systems were reviewed and are negative, except as noted.    PHYSICAL EXAM:  Constitutional: Well developed / well nourished  Eyes: Anicteric, PERRLA  ENMT: nc/at  Neck: supple  Respiratory: CTA B/L  Cardiovascular: RRR  Gastrointestinal: Soft, non distended, mild tenderness at the incision site; incision c/d/i; LIANA w/ serosanguineous output  Genitourinary: Voiding spontaneously  Extremities: SCD's in place and working bilaterally, LUE AVF  Vascular: Palpable dp pulses bilaterally  Neurological: A&O x3  Skin: no rashes, ulcerations or lesions;  Musculoskeletal: Moving all extremities  Psychiatric: Responsive

## 2023-07-26 NOTE — CONSULT NOTE ADULT - ASSESSMENT
52 yo M with h/o ESRD on HD x6 years via LUE AVF (outpatient nephrologist: Dr. Bharath Romo), DM2 CAD, CHF, underwent CABG 2015, AICD (2016), and severe PVD (underwent R big toe and second toe amputation in 2021) initially presented for kidney transplant. Pt. underwent DDRT on 7/16. Pt. was transferred to SICU for IV vasopressor support, now off and transferred to medical floor. Post-transplant course significant for DGF. Pt. received first HD treatment on 7/17, last on 7/21. Pt. under went fistulogram with angioplasty on 7/24.  Endocrinology team consulted for DM management.   He is on HD post tx.     # Stress Hyperglycemia post kidney tx   # ESRD on HD s/p DDRT on 7/16  - cautious with insulin titration; high risk for insulin stacking and hypoglycemia  # Diabetes Mellitus type II   A1c: 5.8%, not reliable in HD   Home meds: Januvia 100 mg daily   GFR: 7    - He is currently on Lantus 24 U HS and Lispro 6 U TID with meals and BG are in mid to high 200s.   - Blood glucose target while hospitalized 140-180 mg/dL  - He has been on Prednisone taper, he has been on prednisone 5 mg daily since 7/25 morning.    - recommend increasing Lantus to 30 U HS   - recommend increasing Lispro to 16 U TID with meals   - c/w mod scale with meals and low dose at night   - do not give scheduled prandial insulin if patient is NPO  - please monitor fingerstick blood glucose at least 4 times a day to avoid insulin toxicity, hypoglycemia  - Carbohydrate controlled diet, no concentrated sweets  - Counseled on need for medication and diet adherence to avoid new complications.   DISCHARGE: basal/bolus, RN to teach insulin     # Dyslipidemia: c.w lipitor 40 mg HS   # Hypertension: BP goal < 130/80, will defer to primary team     Mamie Pittman MD  Pager: 9AM - 5PM (Mon-Fri): [phone]  After 5PM and on Weekends: [phone]     For nonurgent matters, please email [email or web address] for assistance.    54 yo M with h/o ESRD on HD x6 years via LUE AVF (outpatient nephrologist: Dr. Bharath Romo), DM2 CAD, CHF, underwent CABG 2015, AICD (2016), and severe PVD (underwent R big toe and second toe amputation in 2021) initially presented for kidney transplant. Pt. underwent DDRT on 7/16. Pt. was transferred to SICU for IV vasopressor support, now off and transferred to medical floor. Post-transplant course significant for DGF. Pt. received first HD treatment on 7/17, last on 7/21. Pt. under went fistulogram with angioplasty on 7/24.  Endocrinology team consulted for DM management.   He is on HD post tx.     # Stress Hyperglycemia post kidney tx   # ESRD on HD s/p DDRT on 7/16  - cautious with insulin titration; high risk for insulin stacking and hypoglycemia  # Diabetes Mellitus type II   A1c: 5.8%, not reliable in HD   Home meds: Januvia 100 mg daily   GFR: 7     - Blood glucose target while hospitalized 140-180 mg/dL  - He has been on Prednisone taper, he has been on prednisone 5 mg daily since 7/25 morning.    - BG have improved significantly today, yesterday BG were in mid 200s.   - recommend increasing Lantus to 26 U HS   - c/w Lispro 6 U TID with meals    - c/w mod scale with meals and low dose at night   - do not give scheduled prandial insulin if patient is NPO  - please monitor fingerstick blood glucose at least 4 times a day to avoid insulin toxicity, hypoglycemia  - Carbohydrate controlled diet, no concentrated sweets  - Counseled on need for medication and diet adherence to avoid new complications.   DISCHARGE: basal/bolus, RN to teach insulin     # Dyslipidemia: c.w lipitor 40 mg HS   # Hypertension: BP goal < 130/80, will defer to primary team     Mamie Pittman MD  Pager: 9AM - 5PM (Mon-Fri): [phone]  After 5PM and on Weekends: [phone]     For nonurgent matters, please email [email or web address] for assistance.    54 yo M with h/o ESRD on HD x6 years via LUE AVF (outpatient nephrologist: Dr. Bharath Romo), DM2 CAD, CHF, underwent CABG 2015, AICD (2016), and severe PVD (underwent R big toe and second toe amputation in 2021) initially presented for kidney transplant. Pt. underwent DDRT on 7/16. Pt. was transferred to SICU for IV vasopressor support, now off and transferred to medical floor. Post-transplant course significant for DGF. Pt. received first HD treatment on 7/17, last on 7/21. Pt. under went fistulogram with angioplasty on 7/24.  Endocrinology team consulted for DM management.   He is on HD post tx.     # Stress Hyperglycemia post kidney tx   # ESRD on HD s/p DDRT on 7/16  - cautious with insulin titration; high risk for insulin stacking and hypoglycemia  # Diabetes Mellitus type II   A1c: 5.8%, not reliable in HD   Home meds: Januvia 100 mg daily   GFR: 7     - Blood glucose target while hospitalized 140-180 mg/dL  - He has been on Prednisone taper, he has been on prednisone 5 mg daily since 7/25 morning.    - BG have improved significantly today, yesterday BG were in mid 200s.   - recommend c/w Lantus 23 U HS   - c/w Lispro 6 U TID with meals    - c/w mod scale with meals and low dose at night   - do not give scheduled prandial insulin if patient is NPO  - please monitor fingerstick blood glucose at least 4 times a day to avoid insulin toxicity, hypoglycemia  - Carbohydrate controlled diet, no concentrated sweets  - Counseled on need for medication and diet adherence to avoid new complications.   DISCHARGE: basal/bolus, RN to teach insulin     # Dyslipidemia: c.w lipitor 40 mg HS   # Hypertension: BP goal < 130/80, will defer to primary team     Mamie Pittman MD  Pager: 9AM - 5PM (Mon-Fri): [phone]  After 5PM and on Weekends: [phone]     For nonurgent matters, please email [email or web address] for assistance.

## 2023-07-26 NOTE — CONSULT NOTE ADULT - CONSULT REQUESTED BY NAME
Dr. Brooke/Dr Thomas  HD patient
hilepo
primary team
Renal Transplant
Dr. Vizcaino
Dr cardenas / office pt
Dr. Vizcaino

## 2023-07-26 NOTE — PROGRESS NOTE ADULT - PROBLEM SELECTOR PLAN 3
Pt. with anemia in the setting of ESRD/DGF. Hgb is low at 6.8 today, improved to 7.8 on repeat. Recommend EPO qweekly (last dose: 7/21). Monitor hgb.

## 2023-07-27 ENCOUNTER — TRANSCRIPTION ENCOUNTER (OUTPATIENT)
Age: 53
End: 2023-07-27

## 2023-07-27 DIAGNOSIS — E78.5 HYPERLIPIDEMIA, UNSPECIFIED: ICD-10-CM

## 2023-07-27 DIAGNOSIS — I10 ESSENTIAL (PRIMARY) HYPERTENSION: ICD-10-CM

## 2023-07-27 DIAGNOSIS — E11.65 TYPE 2 DIABETES MELLITUS WITH HYPERGLYCEMIA: ICD-10-CM

## 2023-07-27 LAB
ANION GAP SERPL CALC-SCNC: 16 MMOL/L — SIGNIFICANT CHANGE UP (ref 5–17)
APTT BLD: 26.4 SEC — SIGNIFICANT CHANGE UP (ref 24.5–35.6)
BLD GP AB SCN SERPL QL: NEGATIVE — SIGNIFICANT CHANGE UP
BUN SERPL-MCNC: 83 MG/DL — HIGH (ref 7–23)
CALCIUM SERPL-MCNC: 8.7 MG/DL — SIGNIFICANT CHANGE UP (ref 8.4–10.5)
CHLORIDE SERPL-SCNC: 95 MMOL/L — LOW (ref 96–108)
CO2 SERPL-SCNC: 24 MMOL/L — SIGNIFICANT CHANGE UP (ref 22–31)
CREAT SERPL-MCNC: 5.45 MG/DL — HIGH (ref 0.5–1.3)
EGFR: 12 ML/MIN/1.73M2 — LOW
GLUCOSE BLDC GLUCOMTR-MCNC: 120 MG/DL — HIGH (ref 70–99)
GLUCOSE BLDC GLUCOMTR-MCNC: 140 MG/DL — HIGH (ref 70–99)
GLUCOSE BLDC GLUCOMTR-MCNC: 159 MG/DL — HIGH (ref 70–99)
GLUCOSE BLDC GLUCOMTR-MCNC: 166 MG/DL — HIGH (ref 70–99)
GLUCOSE BLDC GLUCOMTR-MCNC: 198 MG/DL — HIGH (ref 70–99)
GLUCOSE SERPL-MCNC: 167 MG/DL — HIGH (ref 70–99)
HCT VFR BLD CALC: 20.9 % — CRITICAL LOW (ref 39–50)
HGB BLD-MCNC: 6.7 G/DL — CRITICAL LOW (ref 13–17)
MAGNESIUM SERPL-MCNC: 2.2 MG/DL — SIGNIFICANT CHANGE UP (ref 1.6–2.6)
MCHC RBC-ENTMCNC: 30.6 PG — SIGNIFICANT CHANGE UP (ref 27–34)
MCHC RBC-ENTMCNC: 32.1 GM/DL — SIGNIFICANT CHANGE UP (ref 32–36)
MCV RBC AUTO: 95.4 FL — SIGNIFICANT CHANGE UP (ref 80–100)
NRBC # BLD: 0 /100 WBCS — SIGNIFICANT CHANGE UP (ref 0–0)
PHOSPHATE SERPL-MCNC: 5 MG/DL — HIGH (ref 2.5–4.5)
PLATELET # BLD AUTO: 146 K/UL — LOW (ref 150–400)
POTASSIUM SERPL-MCNC: 3.8 MMOL/L — SIGNIFICANT CHANGE UP (ref 3.5–5.3)
POTASSIUM SERPL-SCNC: 3.8 MMOL/L — SIGNIFICANT CHANGE UP (ref 3.5–5.3)
RBC # BLD: 2.19 M/UL — LOW (ref 4.2–5.8)
RBC # FLD: 17.4 % — HIGH (ref 10.3–14.5)
RH IG SCN BLD-IMP: POSITIVE — SIGNIFICANT CHANGE UP
SODIUM SERPL-SCNC: 135 MMOL/L — SIGNIFICANT CHANGE UP (ref 135–145)
TACROLIMUS SERPL-MCNC: 6.1 NG/ML — SIGNIFICANT CHANGE UP
WBC # BLD: 9.89 K/UL — SIGNIFICANT CHANGE UP (ref 3.8–10.5)
WBC # FLD AUTO: 9.89 K/UL — SIGNIFICANT CHANGE UP (ref 3.8–10.5)

## 2023-07-27 PROCEDURE — 99232 SBSQ HOSP IP/OBS MODERATE 35: CPT

## 2023-07-27 PROCEDURE — 99232 SBSQ HOSP IP/OBS MODERATE 35: CPT | Mod: GC

## 2023-07-27 PROCEDURE — 99232 SBSQ HOSP IP/OBS MODERATE 35: CPT | Mod: FS,GC,24

## 2023-07-27 RX ORDER — TRAMADOL HYDROCHLORIDE 50 MG/1
1 TABLET ORAL
Qty: 0 | Refills: 0 | DISCHARGE
Start: 2023-07-27

## 2023-07-27 RX ORDER — POLYETHYLENE GLYCOL 3350 17 G/17G
17 POWDER, FOR SOLUTION ORAL ONCE
Refills: 0 | Status: COMPLETED | OUTPATIENT
Start: 2023-07-27 | End: 2023-07-27

## 2023-07-27 RX ORDER — ERYTHROPOIETIN 10000 [IU]/ML
10000 INJECTION, SOLUTION INTRAVENOUS; SUBCUTANEOUS ONCE
Refills: 0 | Status: COMPLETED | OUTPATIENT
Start: 2023-07-27 | End: 2023-07-27

## 2023-07-27 RX ORDER — MAGNESIUM HYDROXIDE 400 MG/1
30 TABLET, CHEWABLE ORAL ONCE
Refills: 0 | Status: COMPLETED | OUTPATIENT
Start: 2023-07-27 | End: 2023-07-27

## 2023-07-27 RX ORDER — MULTIVIT WITH MIN/MFOLATE/K2 340-15/3 G
296 POWDER (GRAM) ORAL ONCE
Refills: 0 | Status: COMPLETED | OUTPATIENT
Start: 2023-07-27 | End: 2023-07-27

## 2023-07-27 RX ORDER — TACROLIMUS 5 MG/1
1 CAPSULE ORAL ONCE
Refills: 0 | Status: COMPLETED | OUTPATIENT
Start: 2023-07-27 | End: 2023-07-27

## 2023-07-27 RX ORDER — LISINOPRIL 2.5 MG/1
1 TABLET ORAL
Qty: 0 | Refills: 0 | DISCHARGE

## 2023-07-27 RX ORDER — TRAMADOL HYDROCHLORIDE 50 MG/1
0.5 TABLET ORAL
Qty: 0 | Refills: 0 | DISCHARGE
Start: 2023-07-27

## 2023-07-27 RX ORDER — FUROSEMIDE 40 MG
1 TABLET ORAL
Qty: 0 | Refills: 0 | DISCHARGE
Start: 2023-07-27

## 2023-07-27 RX ORDER — FUROSEMIDE 40 MG
80 TABLET ORAL
Refills: 0 | Status: DISCONTINUED | OUTPATIENT
Start: 2023-07-27 | End: 2023-07-28

## 2023-07-27 RX ORDER — INSULIN GLARGINE 100 [IU]/ML
23 INJECTION, SOLUTION SUBCUTANEOUS
Qty: 0 | Refills: 0 | DISCHARGE
Start: 2023-07-27

## 2023-07-27 RX ORDER — MYCOPHENOLATE MOFETIL 250 MG/1
500 CAPSULE ORAL
Qty: 0 | Refills: 0 | DISCHARGE
Start: 2023-07-27

## 2023-07-27 RX ORDER — NYSTATIN 500MM UNIT
5 POWDER (EA) MISCELLANEOUS
Qty: 0 | Refills: 0 | DISCHARGE
Start: 2023-07-27

## 2023-07-27 RX ORDER — CLOPIDOGREL BISULFATE 75 MG/1
1 TABLET, FILM COATED ORAL
Qty: 30 | Refills: 0

## 2023-07-27 RX ORDER — SEVELAMER CARBONATE 2400 MG/1
1 POWDER, FOR SUSPENSION ORAL
Qty: 0 | Refills: 0 | DISCHARGE
Start: 2023-07-27

## 2023-07-27 RX ORDER — TACROLIMUS 5 MG/1
2 CAPSULE ORAL
Qty: 0 | Refills: 0 | DISCHARGE
Start: 2023-07-27

## 2023-07-27 RX ORDER — LACTULOSE 10 G/15ML
20 SOLUTION ORAL ONCE
Refills: 0 | Status: COMPLETED | OUTPATIENT
Start: 2023-07-27 | End: 2023-07-27

## 2023-07-27 RX ORDER — CARVEDILOL PHOSPHATE 80 MG/1
1 CAPSULE, EXTENDED RELEASE ORAL
Qty: 0 | Refills: 0 | DISCHARGE

## 2023-07-27 RX ORDER — VALGANCICLOVIR 450 MG/1
1 TABLET, FILM COATED ORAL
Refills: 0 | DISCHARGE
Start: 2023-07-27

## 2023-07-27 RX ORDER — SENNA PLUS 8.6 MG/1
2 TABLET ORAL
Qty: 0 | Refills: 0 | DISCHARGE
Start: 2023-07-27

## 2023-07-27 RX ORDER — TACROLIMUS 5 MG/1
8 CAPSULE ORAL
Refills: 0 | Status: DISCONTINUED | OUTPATIENT
Start: 2023-07-27 | End: 2023-07-28

## 2023-07-27 RX ORDER — SITAGLIPTIN 50 MG/1
1 TABLET, FILM COATED ORAL
Qty: 30 | Refills: 0

## 2023-07-27 RX ORDER — ASCORBIC ACID 60 MG
1 TABLET,CHEWABLE ORAL
Qty: 0 | Refills: 0 | DISCHARGE

## 2023-07-27 RX ADMIN — FAMOTIDINE 20 MILLIGRAM(S): 10 INJECTION INTRAVENOUS at 12:58

## 2023-07-27 RX ADMIN — POLYETHYLENE GLYCOL 3350 17 GRAM(S): 17 POWDER, FOR SOLUTION ORAL at 15:20

## 2023-07-27 RX ADMIN — Medication 6 UNIT(S): at 18:02

## 2023-07-27 RX ADMIN — ERYTHROPOIETIN 10000 UNIT(S): 10000 INJECTION, SOLUTION INTRAVENOUS; SUBCUTANEOUS at 13:29

## 2023-07-27 RX ADMIN — MAGNESIUM HYDROXIDE 30 MILLILITER(S): 400 TABLET, CHEWABLE ORAL at 09:11

## 2023-07-27 RX ADMIN — SEVELAMER CARBONATE 800 MILLIGRAM(S): 2400 POWDER, FOR SUSPENSION ORAL at 17:33

## 2023-07-27 RX ADMIN — ATORVASTATIN CALCIUM 40 MILLIGRAM(S): 80 TABLET, FILM COATED ORAL at 22:00

## 2023-07-27 RX ADMIN — Medication 1 TABLET(S): at 12:58

## 2023-07-27 RX ADMIN — MYCOPHENOLATE MOFETIL 1 GRAM(S): 250 CAPSULE ORAL at 20:11

## 2023-07-27 RX ADMIN — Medication 2: at 09:10

## 2023-07-27 RX ADMIN — LACTULOSE 20 GRAM(S): 10 SOLUTION ORAL at 20:50

## 2023-07-27 RX ADMIN — TACROLIMUS 7 MILLIGRAM(S): 5 CAPSULE ORAL at 08:17

## 2023-07-27 RX ADMIN — Medication 296 MILLILITER(S): at 16:14

## 2023-07-27 RX ADMIN — INSULIN GLARGINE 23 UNIT(S): 100 INJECTION, SOLUTION SUBCUTANEOUS at 22:01

## 2023-07-27 RX ADMIN — MYCOPHENOLATE MOFETIL 1 GRAM(S): 250 CAPSULE ORAL at 08:17

## 2023-07-27 RX ADMIN — SEVELAMER CARBONATE 800 MILLIGRAM(S): 2400 POWDER, FOR SUSPENSION ORAL at 12:59

## 2023-07-27 RX ADMIN — Medication 2: at 22:01

## 2023-07-27 RX ADMIN — Medication 500000 UNIT(S): at 13:00

## 2023-07-27 RX ADMIN — Medication 81 MILLIGRAM(S): at 13:00

## 2023-07-27 RX ADMIN — SEVELAMER CARBONATE 800 MILLIGRAM(S): 2400 POWDER, FOR SUSPENSION ORAL at 08:17

## 2023-07-27 RX ADMIN — Medication 6 UNIT(S): at 09:10

## 2023-07-27 RX ADMIN — TACROLIMUS 1 MILLIGRAM(S): 5 CAPSULE ORAL at 12:58

## 2023-07-27 RX ADMIN — LACTULOSE 20 GRAM(S): 10 SOLUTION ORAL at 17:31

## 2023-07-27 RX ADMIN — Medication 500000 UNIT(S): at 17:33

## 2023-07-27 RX ADMIN — Medication 500000 UNIT(S): at 06:10

## 2023-07-27 RX ADMIN — Medication 5 MILLIGRAM(S): at 06:10

## 2023-07-27 RX ADMIN — Medication 6 UNIT(S): at 13:01

## 2023-07-27 RX ADMIN — POLYETHYLENE GLYCOL 3350 17 GRAM(S): 17 POWDER, FOR SOLUTION ORAL at 02:33

## 2023-07-27 RX ADMIN — Medication 80 MILLIGRAM(S): at 09:20

## 2023-07-27 RX ADMIN — SENNA PLUS 2 TABLET(S): 8.6 TABLET ORAL at 22:00

## 2023-07-27 RX ADMIN — Medication 2: at 18:03

## 2023-07-27 NOTE — PROGRESS NOTE ADULT - PROBLEM SELECTOR PLAN 1
Pt. with h/o ESRD on HD. Underwent DDRT on 7/16/23. Post-transplant course significant for DGF, pt. received first HD treatment on 7/17, last on 7/26. Pt. is non-oliguric, documented urine output is 1.1L over the past 24 hours on IV Lasix. Pt. clinically stable. Labs reviewed. Continue with immunosuppression, as outlined below. Recommend to start Lasix 80 mg PO BID. Pt. will require HD following discharge. Monitor labs and urine output. Avoid nephrotoxins. Dose medications as per eGFR.

## 2023-07-27 NOTE — PROGRESS NOTE ADULT - ASSESSMENT
Physical Therapy Daily Treatment Note     Name: Chago Douglas  Clinic Number: [id number]    Therapy Diagnosis:   Encounter Diagnoses   Name Primary?    Weakness of shoulder     Range of motion deficit      Physician: Gilmar Arnold III, *    Visit Date: 7/31/2019    Physician Orders: eval and treat  Medical Diagnosis:    M75.41 (ICD-10-CM) - Impingement syndrome of right shoulder   M75.21 (ICD-10-CM) - Biceps tendinitis of right upper extremity   M25.511,G89.29 (ICD-10-CM) - Chronic right shoulder pain   M75.101 (ICD-10-CM) - Rotator cuff syndrome of right shoulder         Date of Surgery: 3/7/19  Evaluation Date: 3/13/2019  Authorization Period Expiration: 12/31/19  Plan of Care Certification Period: 8/30/2019  Visit # / Visits authorized: 11/ 25      Time In: 2:10  Time Out: 3:00  Total Billable Time: 38 minutes    Precautions: Standard  PLAN:                                                                                                                                                     1. Continue PT  2. Emphasized scapular function.  3. I have discussed return to activity in detail, may be able to begin throwing at 3 months status post.  4. he will see us back in 4 weeks.                                      5. All questions were answered and he should contact us if he  has any questions or concerns in the interim.                                                            POST OPERATIVE PLAN: We will follow the arthroscopic debridement guidelines. We discussed with the patient's family after surgery. The patient will remain in a sling for 1-2 weeks.  Wean sling x 1-2 weeks, no ROM restrictions.      Subjective     Pt reports: Goal to return to recreational throwing of baseball, no team sport participation anymore. Has missed recent appts due to summer classes. He has been throwing some and doing occasional strength exercises.      He was not compliant with home exercise program.  Response to previous  treatment: no soreness  Functional change: improved AROM/strength    Pain: 0/10  Location: right shoulder      Objective     POD 4 mo 24 days    Full AROM all planes no pain  HHS for strength        Shoulder flx: R= 33.8 lbs, L= 34.3 lbs       Shoulder abd: R= 28 lbs, L= 45.6 lbs (61% deficit)       Shoulder ER: R= 18.7 lbs, L= 22.7 lbs; 20% BW= 30 lbs)    X 15 push ups to fatigue    * no pain any movement/test     Chago received therapeutic exercises to develop strength, ROM, endurance, stability, and flexibility for 30 minutes including:     UBE 4/4 lv 5  Standing Y and T with scap squeeze 5 lbs mitul 2 x 10 each  SL ER 4-5 lbs 4 x 10 reps  TRX T 3 x 10  Prone T, W 2 x 10 3 lbs mitul  Quadruped row 3 x 10 mitul 24.6 lbs    Therapeutic activity for throwing baseball x 10 min  Phase 1 interval throwing 2 x 25 tosses at 45 ft; no pain- fatigued last 5-10 reps    Neuro re-ed for stabilization x 0 min  Body blade PNF D1/2 2 x 30 sec-NP  Cable D1/2 3 x 10 each 3-10 lbs  OH stability- NP  Bosu push ups 3 x 10 reps-NP  Body blade in flx, abd 90 deg and ER 90/90 2 x 30 sec each-NP  CKC shoulder taps/cone taps Y balance- NP  plalloff press at 90 and 120 deg x 15 mitul yellow sport band-NP    Chago received the following manual therapy techniques: Joint mobilizations were applied to the: R shoulder for 0 minutes, including:  AP, inferior glides grade 3/4-NP  Scapular mobilizations all planes grade 3 -NP      Home Exercises Provided and Patient Education Provided     Education provided:   - Pt was educated and demonstrated good understanding of post-op precautions, timeframe for recovery, prognosis, expectations for rehab, normal and expected soreness, activity modification/avoidance/pacing, use of ICE/heat, load v rest ratio, throwing progression/criteria, conditioning program    Written Home Exercises Provided: Patient instructed to cont prior HEP.  Exercises were reviewed and Chago was able to demonstrate them prior to the end of the  session.  Chago demonstrated good  understanding of the education provided.     See EMR under Media for exercises provided prior visit.    Assessment     Pt has not been compliant coming into clinic recently limiting progress. Started interval throwing program and handout given to him to continue indep. ROM and strength are functional with no complaints of pain. However significant weakness remains in R shoulder abd and ER, not near goal of 20% BW for ER strength or L UE. He is not returning to team sport activities and is appropriate for D/C today. Pt reported/demonstrated no adverse response or exacerbation of symptoms during today's session.     Chago is progressing well towards his goals.    Pt prognosis is Excellent.     Pt will continue to benefit from skilled outpatient physical therapy to address the deficits listed in the problem list box on initial evaluation, provide pt/family education and to maximize pt's level of independence in the home and community environment.     Pt's spiritual, cultural and educational needs considered and pt agreeable to plan of care and goals.     Anticipated barriers to physical therapy: none noted/reported    Short Term GOALS:  In 4-8 weeks, pt. will:  1. Pt will increase ROM to the to R shoulder to > 160 deg in order to perform ADLs and IADLs more effectively (met)  2. Decrease overall pain to 2/10 on average with daily activities  (met 4/17/2019)  3. Increase strength to the R shoulder to 4/5 in order to perform ADLs and IADLs more effectively (met)  4. Improve FOTO intake score to 80 to demonstrate improvement with functional mobility and use progressing, not met)  5. Independent with HEP (met)  Long Term GOALS:  In 8-16 weeks, pt. will:  1. Pt will increase ROM to the R shoulder external rotation at 90 to 95 deg in order to perform ADLs and IADLs more effectively (met 4/17/2019)  2. Decrease overall pain to 0/10 on average with daily activities (met)  3. Increase strength  to the R shoulder to 5/5 in order to perform ADLs and IADLs more effectively progressing, not met)  4. Improve FOTO intake score to 90 to demonstrate improvement with functional mobility and use progressing, not met)  5. Independent with HEP progressing, not met)  6. Return to full participation with baseball unrestricted.(progressing, not met)    Plan     Work to D/C to indep HEP/gym routine and continue throwing program as needed over next 2 visits.     Kunal Ivy, PT    52 yo M with h/o ESRD on HD x6 years via LUE AVF (outpatient nephrologist: Dr. Bharath Romo), DM2 CAD, CHF, underwent CABG 2015, AICD (2016), and severe PVD (underwent R big toe and second toe amputation in 2021) initially presented for kidney transplant. Pt. underwent DDRT on 7/16. Pt. was transferred to SICU for IV vasopressor support, now off and transferred to medical floor. Post-transplant course significant for DGF. Pt. received first HD treatment on 7/17, last on 7/26. Pt. underwent fistulogram with angioplasty on 7/24.

## 2023-07-27 NOTE — DISCHARGE NOTE NURSING/CASE MANAGEMENT/SOCIAL WORK - PATIENT PORTAL LINK FT
You can access the FollowMyHealth Patient Portal offered by [facility] by registering at the following website: [email or web address]. By joining [facility]’s FollowMyHealth portal, you will also be able to view your health information using other applications (apps) compatible with our system.

## 2023-07-27 NOTE — PROGRESS NOTE ADULT - SUBJECTIVE AND OBJECTIVE BOX
[facility] DIVISION OF KIDNEY DISEASES AND HYPERTENSION -- FOLLOW UP NOTE  --------------------------------------------------------------------------------    HPI: 54 yo M with h/o ESRD on HD x6 years via LUE AVF (outpatient nephrologist: Dr. Bharath Romo), DM2 CAD, CHF, underwent CABG 2015, AICD (2016), and severe PVD (underwent R big toe and second toe amputation in 2021) initially presented for kidney transplant. Pt. underwent DDRT on 7/16. Pt. was transferred to SICU for IV vasopressor support, now off and transferred to medical floor. Post-transplant course significant for DGF. Pt. received first HD treatment on 7/17, last on 7/26. Pt. underwent fistulogram with angioplasty on 7/24.    24 hour events/subjective: Pt. was seen and evaluated at bedside this morning. Pt. reports improvement in BL UE and LE edema. LUE swelling resolved. Denies any headaches, fevers/chills, chest pain, abdominal pain, and SOB.    PAST HISTORY  --------------------------------------------------------------------------------  No significant changes to PMH, PSH, FHx, SHx, unless otherwise noted    ALLERGIES & MEDICATIONS  --------------------------------------------------------------------------------  Allergies    Contrast. (Unknown)    Intolerances    Standing Inpatient Medications  aspirin  chewable 81 milliGRAM(s) Oral daily  atorvastatin 40 milliGRAM(s) Oral at bedtime  chlorhexidine 4% Liquid 1 Application(s) Topical <User Schedule>  epoetin arian (EPOGEN) Injectable 77446 Unit(s) SubCutaneous once  famotidine    Tablet 20 milliGRAM(s) Oral daily  furosemide    Tablet 80 milliGRAM(s) Oral two times a day  insulin glargine Injectable (LANTUS) 23 Unit(s) SubCutaneous at bedtime  insulin lispro (ADMELOG) corrective regimen sliding scale   SubCutaneous Before meals and at bedtime  insulin lispro Injectable (ADMELOG) 6 Unit(s) SubCutaneous three times a day before meals  mycophenolate mofetil 1 Gram(s) Oral <User Schedule>  nystatin    Suspension 573349 Unit(s) Swish and Swallow four times a day  predniSONE   Tablet 5 milliGRAM(s) Oral daily  senna 2 Tablet(s) Oral at bedtime  sevelamer carbonate 800 milliGRAM(s) Oral three times a day with meals  tacrolimus ER Tablet (ENVARSUS XR) 7 milliGRAM(s) Oral <User Schedule>  trimethoprim   80 mG/sulfamethoxazole 400 mG 1 Tablet(s) Oral daily  valGANciclovir 450 milliGRAM(s) Oral <User Schedule>    PRN Inpatient Medications  dextrose Oral Gel 15 Gram(s) Oral once PRN  traMADol 25 milliGRAM(s) Oral every 6 hours PRN  traMADol 50 milliGRAM(s) Oral every 6 hours PRN      REVIEW OF SYSTEMS  --------------------------------------------------------------------------------  See HPI    VITALS/PHYSICAL EXAM  --------------------------------------------------------------------------------  T(C): 36.7 (07-27-23 @ 09:12), Max: 36.8 (07-26-23 @ 21:45)  HR: 95 (07-27-23 @ 09:12) (79 - 95)  BP: 111/51 (07-27-23 @ 09:12) (111/51 - 159/79)  RR: 18 (07-27-23 @ 09:12) (18 - 18)  SpO2: 99% (07-27-23 @ 09:12) (98% - 100%)  Wt(kg): --    07-26-23 @ 07:01  -  07-27-23 @ 07:00  --------------------------------------------------------  IN: 1320 mL / OUT: 2975 mL / NET: -1655 mL    07-27-23 @ 07:01  -  07-27-23 @ 10:23  --------------------------------------------------------  IN: 120 mL / OUT: 55 mL / NET: 65 mL    Physical Exam:  Gen: NAD  HEENT: PERRL, supple neck, clear oropharynx  Pulm: CTA B/L  CV: RRR, S1S2;  Abd: +BS, soft, nontender/nondistended  : No suprapubic tenderness  Transplant: Surgical site is c/d/i  Extremities: +L UE and BL LE edema (improving)  Skin: Warm, without rashes  Vascular access: +LUE AVF with thrill and bruit appreciated, +swelling around AVF site (improving)    LABS/STUDIES  --------------------------------------------------------------------------------              6.7    9.89  >-----------<  146      [07-27-23 @ 06:35]              20.9     135  |  95  |  83  ----------------------------<  167      [07-27-23 @ 06:36]  3.8   |  24  |  5.45        Ca     8.7     [07-27-23 @ 06:36]      Mg     2.2     [07-27-23 @ 06:36]      Phos  5.0     [07-27-23 @ 06:36]    PTT: 26.4       [07-27-23 @ 06:36]    Creatinine Trend:  SCr 5.45 [07-27 @ 06:36]  SCr 8.03 [07-26 @ 07:03]  SCr 6.83 [07-25 @ 06:46]  SCr 6.30 [07-24 @ 05:06]  SCr 5.58 [07-23 @ 06:32]    Tacrolimus (), Serum: 6.1 ng/mL (07-27 @ 06:36)  Tacrolimus (), Serum: 5.4 ng/mL (07-26 @ 07:00)  Tacrolimus (), Serum: 8.2 ng/mL (07-25 @ 06:44)  Tacrolimus (), Serum: 9.6 ng/mL (07-24 @ 05:06)    HBsAb 5.7      [07-15-23 @ 21:22]  HBsAg Nonreact      [07-15-23 @ 21:21]  HBcAb Nonreact      [07-15-23 @ 21:22]  HCV 0.16, Nonreact      [07-15-23 @ 21:22]  HIV Nonreact      [07-15-23 @ 21:21]

## 2023-07-27 NOTE — PROVIDER CONTACT NOTE (CRITICAL VALUE NOTIFICATION) - ASSESSMENT
Pt A&O4, VSS, asymptomatic, pt offers no complaints at this time
k+ 6.2
Pt is A&O4, asymptomatic, VSS pt has no complaints at this time

## 2023-07-27 NOTE — DISCHARGE NOTE PROVIDER - NSDCCPCAREPLAN_GEN_ALL_CORE_FT
PRINCIPAL DISCHARGE DIAGNOSIS  Diagnosis: Kidney transplant recipient  Assessment and Plan of Treatment:   - Avoid heavy lifting aything over 5lbs for six weeks following your surgery date. Do NOT drive a car or operate machinery unless cleared by your transplant surgeon during your follow up visit. Avoid straining, use stool softners as needed. Stop stool softners if diarrhea develops.   - Call transplant clinic if you develop fever, increased abdominal pain, redness/swelling or bleeding around your incision site  - Call transplant clinic if you have difficulty urinating, notice decrease in urine output or blood in urine.   - Follow FOOD SAFETY instructions provided to you in your patient education guide booklet   - Bathing: Shower is allowed, you can let soap and water flow over your incision; do NOT rub the area. Bath is NOT allowed until your incision is healed and you have been cleared by your transplant surgeon.         SECONDARY DISCHARGE DIAGNOSES  Diagnosis: Immunosuppression  Assessment and Plan of Treatment: - Transplant recipients are at risk for developing infection because immune system is lowered due to transplant medications.   - Avoid contact with sick people; practice good hand hygiene;   - Take medications as directed by your translant team. Never stop taking medications unless instructed by your transplant physician.  - Do NOT double up medication dose if you missed your dose; call the transplant office for instructions.        Diagnosis: Diabetes mellitus  Assessment and Plan of Treatment: If you have diabetes, you may need to monitor your blood sugar more frequently after transplant. Uncongrolled blood sugar levels can lead to poor wound healing and other complications. Follow a low carb and low sugar diet. Continue to take all anti-diabetic medications/insulin as prescribed. Follow up with your Primary Care Doctor regularly for blood sugar/A1c checks. Follow up with an opthalmologist and a podiatrist on an annual basis.

## 2023-07-27 NOTE — PROVIDER CONTACT NOTE (CRITICAL VALUE NOTIFICATION) - ACTION/TREATMENT ORDERED:
SHERIE Maria notified, repeat CBC ordered, Pt safety maintained
POS HD
Provider notified, no interventions given at this time, possible transfusion TBD Pt safety is maintained

## 2023-07-27 NOTE — PROGRESS NOTE ADULT - ASSESSMENT
54 yo M with h/o ESRD on HD x6 years via LUE AVF (outpatient nephrologist: Dr. Bharath Romo), DM2 CAD, CHF, underwent CABG 2015, AICD (2016), and severe PVD (underwent R big toe and second toe amputation in 2021) initially presented for kidney transplant. Pt. underwent DDRT on 7/16. Pt. was transferred to SICU for IV vasopressor support, now off and transferred to medical floor. Post-transplant course significant for DGF. Pt. received first HD treatment on 7/17, last on 7/21. Pt underwent fistulogram with angioplasty on 7/24. Endocrinology team consulted for DM management.   He is on HD post tx. BG Goal 100-180mg/dl. Tolerating POs, BGs in 100s with , mostly at goal. On Prednisone 5 mg daily and Tacrolimus ER 8 mg daily. Discharge today       A1c: 5.8%, not reliable in HD   Home meds: Januvia 100 mg daily     Met with patient and reviewed the following:    -A1c LEVEL: Present and goal  -Blood glucose goals: 100s to 150s as out pt  -Glucose monitoring frequency: ac and hs  -Hypoglycemia prevention, detection and treatment ( 15: 15 rule discussed )  -Healthy eating and portion control  -Insulin(s) action, time of administration and side effects  -Importance of follow up care  -RN to educate pt on insulin pen use and glucometer use   Pt able to give teach back with no assistance  Spent over 30 minutes providing face to face education.

## 2023-07-27 NOTE — DISCHARGE NOTE PROVIDER - NSDCMRMEDTOKEN_GEN_ALL_CORE_FT
aspirin 81 mg oral delayed release tablet: 1 tab(s) orally once a day  atorvastatin 40 mg oral tablet: 1 tab(s) orally once a day (at bedtime)  furosemide 80 mg oral tablet: 1 tab(s) orally 2 times a day  HumaLOG KwikPen 100 units/mL injectable solution: 6 unit(s) injectable 3 times a day (before meals)  insulin glargine 100 units/mL subcutaneous solution: 23 unit(s) subcutaneous once a day (at bedtime)  mycophenolate mofetil 250 mg oral capsule: 1,000 milligram(s) orally 2 times a day  nystatin 100,000 units/mL oral suspension: 5 milliliter(s) orally 4 times a day  predniSONE 5 mg oral tablet: 1 tab(s) orally once a day  senna leaf extract oral tablet: 2 tab(s) orally once a day (at bedtime)  sevelamer carbonate 800 mg oral tablet: 1 tab(s) orally 3 times a day (with meals)  sulfamethoxazole-trimethoprim 400 mg-80 mg oral tablet: 1 tab(s) orally once a day  tacrolimus 4 mg oral tablet, extended release: 2 tab(s) orally once a day 8AM  valGANciclovir 450 mg oral tablet: 1 tab(s) orally Monday, Wednesday, and Friday   aspirin 81 mg oral delayed release tablet: 1 tab(s) orally once a day  atorvastatin 40 mg oral tablet: 1 tab(s) orally once a day (at bedtime)  furosemide 80 mg oral tablet: 1 tab(s) orally 2 times a day  HumaLOG KwikPen 100 units/mL injectable solution: 6 unit(s) injectable 3 times a day (before meals)  insulin glargine 100 units/mL subcutaneous solution: 23 unit(s) subcutaneous once a day (at bedtime)  mycophenolate mofetil 250 mg oral capsule: 1,000 milligram(s) orally 2 times a day  nystatin 100,000 units/mL oral suspension: 5 milliliter(s) orally 4 times a day  Occupational Therapy: Evaluation and Treatment  Physical therapy: Evaluation and treatment  predniSONE 5 mg oral tablet: 1 tab(s) orally once a day  rolling walker: Use as needed  senna leaf extract oral tablet: 2 tab(s) orally once a day (at bedtime)  sevelamer carbonate 800 mg oral tablet: 1 tab(s) orally 3 times a day (with meals)  sulfamethoxazole-trimethoprim 400 mg-80 mg oral tablet: 1 tab(s) orally once a day  tacrolimus 4 mg oral tablet, extended release: 2 tab(s) orally once a day 8AM  valGANciclovir 450 mg oral tablet: 1 tab(s) orally Monday, Wednesday, and Friday

## 2023-07-27 NOTE — DISCHARGE NOTE PROVIDER - NSDCHHNEEDSERVICEOTHER_GEN_ALL_CORE_FT
Empty and record LIANA drain output 2 times daily and as needed.  Bring record out drain output to your clinic appointment Empty and record LIANA drain output 2 times daily and as needed.  Bring record out drain output to your clinic appointment  New to Insulin

## 2023-07-27 NOTE — DISCHARGE NOTE PROVIDER - NSDCFUADDINST_GEN_ALL_CORE_FT
Empty and record drain output 2 times daily and as needed. Bring record of drain output to your clinic appointment

## 2023-07-27 NOTE — PROGRESS NOTE ADULT - PROBLEM SELECTOR PLAN 1
-Test BGs ACTID and at HS  - Continue with Lantus 23 U at HS   - Continue with Lispro 6 U TID with meals, Hold if not eating or NPO  - c/w mod scale with meals and low dose at night   - Carbohydrate controlled diet, no concentrated sweets  - RD consult for education about consistent carbohydrate diet   - Please keep hypoglycemia protocol in place     DISCHARGE:   -Discharge home today  - Can be discharged on current insulin regimen ( Lantus 23 units at HS and premeal Admelog 6 units ACTID, hold if not eating)  - Patient to follow up with PCP in one week to review BGs and adjust insulin doses. pt to contact PCP for BGs >200 for 2 days or BG < 70 X1.  -pt has inpt medicare and medicaid. [facility] endocrine faculty do not accept Medicaid. pt can follow up at [facility] located in [facility] on [address] phone :[phone]. Email sent to assist scheduling an appointment   -

## 2023-07-27 NOTE — PROGRESS NOTE ADULT - PROBLEM SELECTOR PLAN 3
Currently on Atorvastatin 40 mg daily   Goal LDL < 70    Assessed pt/labs/meds and discussed plan of care with primary team/pt  Insulin adjustment   Discharge plan  Follow up care    Contact via [employer] Teams during business hours  To reach covering provider access AMION via [employer] tools  For Urgent matters/after-hours/weekends/holidays please page endocrine fellow on call   For nonurgent matters please email [email or web address]    Please note that this patient may be followed by different provider tomorrow.  Notify endocrine 24 hours prior to discharge for final recommendations

## 2023-07-27 NOTE — DISCHARGE NOTE PROVIDER - NSDCFUADDAPPT_GEN_ALL_CORE_FT
You will be contacted to schedule follow-up appointment on Tuesday 8/1/23 in the Transplant Clinic.    Follow-up with your endocrinologist as outpatient You will be contacted to schedule follow-up appointment on Tuesday 8/1/23 in the Transplant Clinic.    You will need to have uretreral stent removed in 4-6 weeks    Follow up at [facility] located in [facility] on [address] phone :[phone]. You will be contacted to scheduling an appointment

## 2023-07-27 NOTE — DISCHARGE NOTE PROVIDER - NSDCHHNEEDSERVICE_GEN_ALL_CORE
Medication teaching and assessment/Observation and assessment/Other, specify... Medication teaching and assessment/Observation and assessment/Rehabilitation services/Other, specify...

## 2023-07-27 NOTE — PROGRESS NOTE ADULT - PROBLEM SELECTOR PLAN 3
Pt. with anemia in the setting of ESRD/DGF. Hgb is low at 6.7 today. Recommend blood transfusion. Will dose EPO today. Monitor hgb.

## 2023-07-27 NOTE — PROGRESS NOTE ADULT - PROBLEM SELECTOR PLAN 4
Pt. with hyperphosphatemia in the setting of ESRD/DGF. Serum phosphorus is elevated at 5.0 today. Pt. is receiving Sevelamer 1600 mg tid. Recommend to continue with phosphorus binder. Low phosphorus diet. Monitor serum phosphorus, goal: 3.5-5.5,    If you have any questions, please feel free to contact me  Enrique Nagel  Nephrology Fellow  [phone] / [employer] Teams(Preferred)  (After 5pm or on weekends please page the on-call fellow)

## 2023-07-27 NOTE — PROGRESS NOTE ADULT - SUBJECTIVE AND OBJECTIVE BOX
Transplant Surgery - Multidisciplinary Rounds  --------------------------------------------------------------  DDRT    Date: 07/16/2023     POD#11    Patient seen and examined with multidisciplinary team including transplant surgeon  transplant nephrologist Dr. Charlie Corado, pharmacist Jorge Alberto Plunkett, [facility], and unit JARROD Lang during AM rounds. Disciplines not in attendance will be notified of the plan.    Interval Events:  - Lantus increased and FS have been well controlled.  - Started sevelamer 800 mg PO TID for hyperphosphatemia  - Lasix changed to PO today  - Hemoglobin downtrending today requiring 1 unit of PRBC  - HD tolerated well yesterday, -1700cc and reports feeling much better this morning.     Immunosuppression:  Induction: Simulect -> thymoglobulin  Maintenance immunosuppression: Envarsus 6 mg PO daily, prednisone 5 mg PO daily, MMF 1 g PO BID  Ongoing monitoring for signs of rejection.    Potential Discharge date: today    T(C): 36.7 (07-27-23 @ 09:12), Max: 36.8 (07-26-23 @ 21:45)  T(F): 98.1 (07-27-23 @ 09:12), Max: 98.3 (07-27-23 @ 01:22)  HR: 95 (07-27-23 @ 09:12) (79 - 95)  BP: 111/51 (07-27-23 @ 09:12) (111/51 - 159/79)  RR: 18 (07-27-23 @ 09:12) (18 - 18)  SpO2: 99% (07-27-23 @ 09:12) (98% - 100%)    07-26-23 @ 07:01  -  07-27-23 @ 07:00  --------------------------------------------------------  IN:    Oral Fluid: 1320 mL  Total IN: 1320 mL    OUT:    Bulb (mL): 130 mL    Other (mL): 1700 mL    Voided (mL): 1145 mL  Total OUT: 2975 mL    Total NET: -1655 mL      07-27-23 @ 07:01  -  07-27-23 @ 10:42  --------------------------------------------------------  IN:    Oral Fluid: 120 mL  Total IN: 120 mL    OUT:    Bulb (mL): 5 mL    Voided (mL): 50 mL  Total OUT: 55 mL    Total NET: 65 mL          aspirin  chewable 81 milliGRAM(s) Oral daily  atorvastatin 40 milliGRAM(s) Oral at bedtime  chlorhexidine 4% Liquid 1 Application(s) Topical <User Schedule>  dextrose Oral Gel 15 Gram(s) Oral once PRN  epoetin arian (EPOGEN) Injectable 72883 Unit(s) SubCutaneous once  famotidine    Tablet 20 milliGRAM(s) Oral daily  furosemide    Tablet 80 milliGRAM(s) Oral two times a day  insulin glargine Injectable (LANTUS) 23 Unit(s) SubCutaneous at bedtime  insulin lispro (ADMELOG) corrective regimen sliding scale   SubCutaneous Before meals and at bedtime  insulin lispro Injectable (ADMELOG) 6 Unit(s) SubCutaneous three times a day before meals  mycophenolate mofetil 1 Gram(s) Oral <User Schedule>  nystatin    Suspension 497416 Unit(s) Swish and Swallow four times a day  predniSONE   Tablet 5 milliGRAM(s) Oral daily  senna 2 Tablet(s) Oral at bedtime  sevelamer carbonate 800 milliGRAM(s) Oral three times a day with meals  tacrolimus ER Tablet (ENVARSUS XR) 7 milliGRAM(s) Oral <User Schedule>  traMADol 50 milliGRAM(s) Oral every 6 hours PRN  traMADol 25 milliGRAM(s) Oral every 6 hours PRN  trimethoprim   80 mG/sulfamethoxazole 400 mG 1 Tablet(s) Oral daily  valGANciclovir 450 milliGRAM(s) Oral <User Schedule>        PAST MEDICAL & SURGICAL HISTORY:  Diabetes mellitus type 2  Foot ulcer due to secondary DM  Coronary artery disease  Acute on chronic systolic heart failure  Breast Reduction at age 18  Toe amputation status, left  S/P CABG (coronary artery bypass graft)  AICD (automatic cardioverter/defibrillator) present      Review of systems  Gen: No weight changes, fatigue, fevers/chills, weakness  Skin: No rashes  Head/Eyes/Ears/Mouth: No headache; Normal hearing; Normal vision w/o blurriness; No sinus pain/discomfort, sore throat  Respiratory: No dyspnea, cough, wheezing, hemoptysis  CV: No chest pain, PND, orthopnea  GI: Mild abdominal pain at surgical incision site; denies diarrhea, constipation, nausea, vomiting, melena, hematochezia  : No increased frequency, dysuria, hematuria, nocturia  MSK: No joint pain/swelling; no back pain; no edema  Neuro: No dizziness/lightheadedness, weakness, seizures, numbness, tingling  Heme: No easy bruising or bleeding  Endo: No heat/cold intolerance  Psych: No significant nervousness, anxiety, stress, depression  All other systems were reviewed and are negative, except as noted.    PHYSICAL EXAM:  Constitutional: Well developed / well nourished  Eyes: Anicteric, PERRLA  ENMT: nc/at  Neck: supple  Respiratory: CTA B/L  Cardiovascular: RRR  Gastrointestinal: Soft, non distended, mild tenderness at the incision site; incision c/d/i; LIANA w/ serosanguineous output  Genitourinary: Voiding spontaneously  Extremities: SCD's in place and working bilaterally, LUE AVF  Vascular: Palpable dp pulses bilaterally  Neurological: A&O x3  Skin: no rashes, ulcerations or lesions;  Musculoskeletal: Moving all extremities  Psychiatric: Responsive

## 2023-07-27 NOTE — PROGRESS NOTE ADULT - SUBJECTIVE AND OBJECTIVE BOX
Seen earlier today     Chief Complaint: Diabetes Mellitus follow up    INTERVAL HX: Tolerating POs with good appetite. BGs in 100s with . BGs mostly at goal. As per RN, patient will be discharged today. On Prednisone 5 mg daily and Tacrolimus ER 8 mg daily      Review of Systems:  General: As above  GI: No nausea, vomiting  Endocrine: no  S&Sx of hypoglycemia    Allergies    Contrast. (Unknown)    Intolerances      MEDICATIONS  (STANDING):  atorvastatin 40 milliGRAM(s) Oral at bedtime  insulin glargine Injectable (LANTUS) 23 Unit(s) SubCutaneous at bedtime  insulin lispro (ADMELOG) corrective regimen sliding scale   SubCutaneous Before meals and at bedtime  insulin lispro Injectable (ADMELOG) 6 Unit(s) SubCutaneous three times a day before meals  predniSONE   Tablet 5 milliGRAM(s) Oral daily  senna 2 Tablet(s) Oral at bedtime  tacrolimus ER Tablet (ENVARSUS XR) 8 milliGRAM(s) Oral <User Schedule>  trimethoprim   80 mG/sulfamethoxazole 400 mG 1 Tablet(s) Oral daily  valGANciclovir 450 milliGRAM(s) Oral <User Schedule>      atorvastatin   40 milliGRAM(s) Oral (07-26-23 @ 22:36)    insulin glargine Injectable (LANTUS)   23 Unit(s) SubCutaneous (07-26-23 @ 22:36)    insulin lispro (ADMELOG) corrective regimen sliding scale   2 Unit(s) SubCutaneous (07-27-23 @ 09:10)    insulin lispro Injectable (ADMELOG)   6 Unit(s) SubCutaneous (07-27-23 @ 13:01)   6 Unit(s) SubCutaneous (07-27-23 @ 09:10)   6 Unit(s) SubCutaneous (07-26-23 @ 18:04)    predniSONE   Tablet   5 milliGRAM(s) Oral (07-27-23 @ 06:10)        PHYSICAL EXAM:  VITALS: T(C): 36.7 (07-27-23 @ 13:32)  T(F): 98.1 (07-27-23 @ 13:32), Max: 98.3 (07-27-23 @ 01:22)  HR: 88 (07-27-23 @ 13:32) (85 - 95)  BP: 115/52 (07-27-23 @ 13:32) (111/51 - 138/71)  RR:  (18 - 18)  SpO2:  (98% - 100%)  Wt(kg): --  GENERAL: Male sitting in chair,  in NAD  Respiratory: Respirations unlabored   Extremities: Warm, no edema  NEURO: Alert , appropriate     LABS:  POCT Blood Glucose.: 140 mg/dL (07-27-23 @ 12:56)  POCT Blood Glucose.: 159 mg/dL (07-27-23 @ 09:07)  POCT Blood Glucose.: 120 mg/dL (07-27-23 @ 01:18)  POCT Blood Glucose.: 136 mg/dL (07-26-23 @ 21:44)  POCT Blood Glucose.: 143 mg/dL (07-26-23 @ 17:45)  POCT Blood Glucose.: 129 mg/dL (07-26-23 @ 15:40)  POCT Blood Glucose.: 165 mg/dL (07-26-23 @ 08:51)  POCT Blood Glucose.: 274 mg/dL (07-25-23 @ 21:07)  POCT Blood Glucose.: 219 mg/dL (07-25-23 @ 16:57)  POCT Blood Glucose.: 276 mg/dL (07-25-23 @ 13:21)  POCT Blood Glucose.: 411 mg/dL (07-25-23 @ 10:18)  POCT Blood Glucose.: 336 mg/dL (07-25-23 @ 08:45)  POCT Blood Glucose.: 288 mg/dL (07-24-23 @ 21:08)  POCT Blood Glucose.: 216 mg/dL (07-24-23 @ 18:12)                          6.7    9.89  )-----------( 146      ( 27 Jul 2023 06:35 )             20.9     07-27    135  |  95<L>  |  83<H>  ----------------------------<  167<H>  3.8   |  24  |  5.45<H>    Ca    8.7      27 Jul 2023 06:36  Phos  5.0     07-27  Mg     2.2     07-27      eGFR: 12 mL/min/1.73m2 (27 Jul 2023 06:36)      Thyroid Function Tests:      A1C with Estimated Average Glucose Result: 5.8 % (07-26-23 @ 07:25)    Estimated Average Glucose: 120 mg/dL (07-26-23 @ 07:25)        Diet, Renal Restrictions:   For patients receiving Renal Replacement - No Protein Restr, No Conc K, No Conc Phos, Low Sodium  Consistent Carbohydrate No Snacks (CSTCHO)  Supplement Feeding Modality:  Oral  Nepro Cans or Servings Per Day:  1       Frequency:  Daily (07-24-23 @ 10:36) [Active]

## 2023-07-27 NOTE — DISCHARGE NOTE PROVIDER - HOSPITAL COURSE
54 yo M with hx of DM2 CAD, CHF, s/p CABG 2015, AICD (2016), on hemodialysis for approximately 6 years via L AVF (nephrologist Dr. Bharath Romo), He has hx of PVD, is s/p 4 stents in R leg (mid and distal right superficial femoral artery, right popliteal x2 on 1/14/22).; is sp RT big toe and second toe amputation 2021, on asa and plavix for severe PVD (per pt).     Underwent Left sided DDRT on 7/16/23 under Simulect induction  Postoperative course complicated by delayed graft function requiring hemodialysis.   Simulect changed to Thymoglobulin, completed 6mg /kg  LUE swelling -> duplex c/f L subclavian thrombus.  Heparin started   Underwent successful IR fistulogram on 7/7/24. Heparin discontinued. No need for full anticoagulation. ASA 81mg po qd started  Endocrinology consulted for hyperglycemia. Regimen optimized  Transfused 1 PRBC of day of discharge   Home med Plavix not resumed pending plan for outpatient renal biopsy    He was tolerating regular diet, ambulating and had bowel function.  He was evaluated by the multi-disciplinary team including surgeon, nephrologist, ACP, pharmacist, nutrition, social work, and nursing and deemed stable for discharge with the following plan:     MEDS:  Envarsus 8mg po qd, MMF 1gm BID, Prednisone 5mg po qd  ppx: Bactrim, Nystatin and Valcyte 450mg TIW.      FOLLOW-UP:  Continue outpatient HD  Follow-up in clinic Tuesday 8/1/23     54 yo M with hx of DM2 CAD, CHF, s/p CABG 2015, AICD (2016), on hemodialysis for approximately 6 years via L AVF (nephrologist Dr. Bharath Romo), He has hx of PVD, is s/p 4 stents in R leg (mid and distal right superficial femoral artery, right popliteal x2 on 1/14/22).; is sp RT big toe and second toe amputation 2021, on asa and plavix for severe PVD (per pt).     Underwent Left sided DDRT on 7/16/23 under Simulect induction  Postoperative course complicated by delayed graft function requiring hemodialysis.   Simulect changed to Thymoglobulin, completed 6mg /kg  LUE swelling -> duplex c/f L subclavian thrombus.  Heparin started   Underwent successful IR fistulogram on 7/7/24. Heparin discontinued. No need for full anticoagulation. ASA 81mg po qd started  Endocrinology consulted for hyperglycemia. Regimen optimized  Transfused 1 PRBC of day of discharge   Home med Plavix not resumed pending plan for outpatient renal biopsy    He was tolerating regular diet, ambulating and had bowel function.  He was evaluated by the multi-disciplinary team including surgeon, nephrologist, ACP, pharmacist, nutrition, social work, and nursing and deemed stable for discharge with the following plan:     MEDS:  Envarsus 8mg po qd, MMF 1gm BID, Prednisone 5mg po qd  ppx: Bactrim, Nystatin and Valcyte 450mg TIW.      FOLLOW-UP:  Continue outpatient HD  Follow-up in clinic Tuesday 8/1/23  Follow up with Endocrinology

## 2023-07-27 NOTE — PROGRESS NOTE ADULT - NUTRITIONAL ASSESSMENT
Diet, Renal Restrictions:   For patients receiving Renal Replacement - No Protein Restr, No Conc K, No Conc Phos, Low Sodium  Consistent Carbohydrate {No Snacks} (CSTCHO)  Supplement Feeding Modality:  Oral  Nepro Cans or Servings Per Day:  1       Frequency:  Daily (07-24-23 @ 10:36) [Active]

## 2023-07-27 NOTE — PROGRESS NOTE ADULT - ASSESSMENT
Patient is a 53y Male  with hx of DM2 CAD, CHF, s/p CABG 2015, AICD (2016), on hemodialysis for approximately 6 years via L AVF (nephrologist Dr. Bharath Romo), He has hx of PVD, is s/p 4 stents in R leg (mid and distal right superficial femoral artery, right popliteal x2 on 1/14/22).  s/p  RT big toe and second toe amputation 2021, on asa and plavix for severe PVD (per pt).   Patient is s/p DDRT 7/16, requiring levophed drip and insulin drip post op with hyperkalemia, transferred to SICU          Post op hypotension/ resolved    - diet as tolerated  - Bowel regimen for constipation    Renal: s/p DDRT for ESRD 2/2 DM  -has left arm fistula,   hyperkalemia better  hd as per renal  - care per renal transplant regarding: tc / meds  - Monitor I&Os and electrolytes  - replete electrolytes as needed   hd as per renal /      - Monitor CBC and coags/transfuse 1 unit today   -  VTE prophylaxis: hold  -SCD's        Endo: hx of DM,   - Hyperglycemia improved  endo eval noted      steroid taper \    ?renal biopsy will be decided as outpt       -oob

## 2023-07-27 NOTE — DISCHARGE NOTE NURSING/CASE MANAGEMENT/SOCIAL WORK - NSDCFUADDAPPT_GEN_ALL_CORE_FT
You will be contacted to schedule follow-up appointment on Tuesday 8/1/23 in the Transplant Clinic.    Follow-up with your endocrinologist as outpatient

## 2023-07-27 NOTE — PROGRESS NOTE ADULT - SUBJECTIVE AND OBJECTIVE BOX
[facility]   MD DANIEL WALDEN MD BRIANA PETITO, NP    TEL:  FROM 9 AM to 5 PM ---OFFICE: [phone]  FROM 5 PM - 9 AM PLEASE CALL ANSWERING SERVICE: [phone]     RENAL PROGRESS NOTE: DATE OF SERVICE 07-27-23 @ 12:47  Patient is a 53y old  Male who presents with a chief complaint of Possible DDRT    Patient seen and examined at bedside. No chest pain/sob    VITALS:  T(F): 97.1 (07-27-23 @ 10:40), Max: 98.3 (07-27-23 @ 01:22)  HR: 90 (07-27-23 @ 10:40)  BP: 113/50 (07-27-23 @ 10:40)  RR: 18 (07-27-23 @ 10:40)  SpO2: 100% (07-27-23 @ 10:40)  Wt(kg): --    07-26 @ 07:01  -  07-27 @ 07:00  --------------------------------------------------------  IN: 1320 mL / OUT: 2975 mL / NET: -1655 mL    07-27 @ 07:01  -  07-27 @ 12:47  --------------------------------------------------------  IN: 120 mL / OUT: 55 mL / NET: 65 mL      PHYSICAL EXAM:  Constitutional: NAD  Neck: No JVD  Respiratory: CTAB, no wheezes, rales or rhonchi  Cardiovascular: S1, S2, RRR  Gastrointestinal: BS+, soft, NT/ND  Extremities: No peripheral edema  Access: L AVF palpable thrill, bruit auscultated       Hospital Medications:   MEDICATIONS  (STANDING):  aspirin  chewable 81 milliGRAM(s) Oral daily  atorvastatin 40 milliGRAM(s) Oral at bedtime  chlorhexidine 4% Liquid 1 Application(s) Topical <User Schedule>  epoetin arian (EPOGEN) Injectable 70332 Unit(s) SubCutaneous once  famotidine    Tablet 20 milliGRAM(s) Oral daily  furosemide    Tablet 80 milliGRAM(s) Oral two times a day  insulin glargine Injectable (LANTUS) 23 Unit(s) SubCutaneous at bedtime  insulin lispro (ADMELOG) corrective regimen sliding scale   SubCutaneous Before meals and at bedtime  insulin lispro Injectable (ADMELOG) 6 Unit(s) SubCutaneous three times a day before meals  mycophenolate mofetil 1 Gram(s) Oral <User Schedule>  nystatin    Suspension 962821 Unit(s) Swish and Swallow four times a day  predniSONE   Tablet 5 milliGRAM(s) Oral daily  senna 2 Tablet(s) Oral at bedtime  sevelamer carbonate 800 milliGRAM(s) Oral three times a day with meals  tacrolimus ER Tablet (ENVARSUS XR) 8 milliGRAM(s) Oral <User Schedule>  tacrolimus ER Tablet (ENVARSUS XR) 1 milliGRAM(s) Oral once  trimethoprim   80 mG/sulfamethoxazole 400 mG 1 Tablet(s) Oral daily  valGANciclovir 450 milliGRAM(s) Oral <User Schedule>  Tacrolimus (), Serum: 6.1 ng/mL (07-27 @ 06:36)    LABS:  07-27    135  |  95<L>  |  83<H>  ----------------------------<  167<H>  3.8   |  24  |  5.45<H>    Ca    8.7      27 Jul 2023 06:36  Phos  5.0     07-27  Mg     2.2     07-27      Creatinine Trend: 5.45 <--, 8.03 <--, 6.83 <--, 6.30 <--, 5.58 <--, 4.45 <--, 4.88 <--    Phosphorus: 5.0 mg/dL (07-27 @ 06:36)                              6.7    9.89  )-----------( 146      ( 27 Jul 2023 06:35 )             20.9     Urine Studies:  Urinalysis - [07-27-23 @ 06:36]      Color  / Appearance  / SG  / pH       Gluc 167 / Ketone   / Bili  / Urobili        Blood  / Protein  / Leuk Est  / Nitrite       RBC  / WBC  / Hyaline  / Gran  / Sq Epi  / Non Sq Epi  / Bacteria         HBsAb 5.7      [07-15-23 @ 21:22]  HBsAg Nonreact      [07-15-23 @ 21:21]  HBcAb Nonreact      [07-15-23 @ 21:22]  HCV 0.16, Nonreact      [07-15-23 @ 21:22]  HIV Nonreact      [07-15-23 @ 21:21]      RADIOLOGY & ADDITIONAL STUDIES:

## 2023-07-27 NOTE — DISCHARGE NOTE PROVIDER - CARE PROVIDER_API CALL
Michel Vizcaino.  Surgery  [address] [place] [address]  Phone: [phone]  Fax: [phone]  Follow Up Time:

## 2023-07-27 NOTE — PROGRESS NOTE ADULT - ASSESSMENT
52 yo M with hx of DM2 CAD, CHF, s/p CABG 2015, AICD (2016), on hemodialysis for approximately 6 years via L AVF (nephrologist Dr. Bharath Romo), He has hx of PVD, is s/p 4 stents in R leg (mid and distal right superficial femoral artery, right popliteal x2 on 1/14/22).; is sp RT big toe and second toe amputation 2021, on asa and plavix for severe PVD (per pt). Now here for possible DDRT. Pt reports does not make urine only few drops occasionally. Pt denies N/V/D, fevers/chills, CP, SOB. Pt reports last ate at 4pm today and had HD yesterday 7/14/23.  Nephrology consulted for ESRD s/p DDRT.      ESRD on HD [facility]  Nephrologist : Dr. Thomas / Dr. Brooke at [facility]   s/p DDRT 7/16   s/p HD 7/19- 7/22  s/p Left Radiocephalic fistulogram and subclavian vein angioplasty with Vascular 7/24  HD per transplant team --> s/p HD 7/26. Per TP, will likely need to continue HD as outpatient.   HD consent in chart.   Renal diet   Monitor     s/p DDRT @ [facility] 7/16/2023   Underwent L DDRT 7/13/2023 s/p Simulect and solumedrol for induction.   Repeat graft US with patent vessels  Management per Transplant  LUE swelling--> LUE swelling likely secondary to L subclavian thrombus noted on 7/18 duplex. S/p for left radiocephalic fistulogram and subclavian vein angioplasty (7/24).   Documented urine output is ~1145cc over the past 24 hours on IV Lasix   s/p HD 7/26   Continue immunosuppressants per transplant : Envarsus 7 mg qd, Cellcept 1 gm BID, and prednisone taper.   Initially received Simulect induction - Changed to Thymoglobulin given delayed graft function. Dose #2 7/22  Valcyte to every other day (MWF) Nystatin, Bactrim renal dose  Check tacrolimus trough 30 minutes prior to AM dose  Tacro level 7/27 is 5.3   Per Transplant, will continue HD as outpatient upon d/c.     Anemia  plan for prbc today  Maintain Hgb > 8  On LUX  Monitor     Hyperkalemia   in setting of ESRD and recent DDRT with sub-optimally functioning allograft.   Low K diet   Improving   Mgmt per Transplant  Monitor       HTN:   BP optimal     Monitor     Hyperphosphatemia  improving  Low PO4 diet  Phoslo switched to sevelamer 1600 mg tid -- continue at present   Monitor PO4 daily

## 2023-07-27 NOTE — PROGRESS NOTE ADULT - SUBJECTIVE AND OBJECTIVE BOX
DATE OF SERVICE: 07-27-23 @ 11:32  CHIEF COMPLAINT:Patient is a 53y old  Male who presents with a chief complaint of Possible DDRT (27 Jul 2023 10:39)    	        PAST MEDICAL & SURGICAL HISTORY:  Diabetes mellitus  type 2      Foot ulcer due to secondary DM      Coronary artery disease      Acute on chronic systolic heart failure      Breast Reduction  at age 18      Toe amputation status, left      S/P CABG (coronary artery bypass graft)      AICD (automatic cardioverter/defibrillator) present              REVIEW OF SYSTEMS:    NECK: No pain or stiffness  RESPIRATORY: No cough, wheezing, chills or hemoptysis; No Shortness of Breath  CARDIOVASCULAR: No chest pain, palpitations, passing out,   GASTROINTESTINAL: No abdominal or epigastric pain. No nausea, vomiting,   constipated  GENITOURINARY: No dysuria, frequency, hematuria, or incontinence  NEUROLOGICAL: No headaches,     Medications:  MEDICATIONS  (STANDING):  aspirin  chewable 81 milliGRAM(s) Oral daily  atorvastatin 40 milliGRAM(s) Oral at bedtime  chlorhexidine 4% Liquid 1 Application(s) Topical <User Schedule>  epoetin arian (EPOGEN) Injectable 24812 Unit(s) SubCutaneous once  famotidine    Tablet 20 milliGRAM(s) Oral daily  furosemide    Tablet 80 milliGRAM(s) Oral two times a day  insulin glargine Injectable (LANTUS) 23 Unit(s) SubCutaneous at bedtime  insulin lispro (ADMELOG) corrective regimen sliding scale   SubCutaneous Before meals and at bedtime  insulin lispro Injectable (ADMELOG) 6 Unit(s) SubCutaneous three times a day before meals  mycophenolate mofetil 1 Gram(s) Oral <User Schedule>  nystatin    Suspension 144550 Unit(s) Swish and Swallow four times a day  predniSONE   Tablet 5 milliGRAM(s) Oral daily  senna 2 Tablet(s) Oral at bedtime  sevelamer carbonate 800 milliGRAM(s) Oral three times a day with meals  tacrolimus ER Tablet (ENVARSUS XR) 1 milliGRAM(s) Oral once  tacrolimus ER Tablet (ENVARSUS XR) 8 milliGRAM(s) Oral <User Schedule>  trimethoprim   80 mG/sulfamethoxazole 400 mG 1 Tablet(s) Oral daily  valGANciclovir 450 milliGRAM(s) Oral <User Schedule>    MEDICATIONS  (PRN):  dextrose Oral Gel 15 Gram(s) Oral once PRN Blood Glucose LESS THAN 70 milliGRAM(s)/deciliter  traMADol 50 milliGRAM(s) Oral every 6 hours PRN Severe Pain (7 - 10)  traMADol 25 milliGRAM(s) Oral every 6 hours PRN Moderate Pain (4 - 6)    	    PHYSICAL EXAM:  T(C): 36.2 (07-27-23 @ 10:40), Max: 36.8 (07-26-23 @ 21:45)  HR: 90 (07-27-23 @ 10:40) (79 - 95)  BP: 113/50 (07-27-23 @ 10:40) (111/51 - 159/79)  RR: 18 (07-27-23 @ 10:40) (18 - 18)  SpO2: 100% (07-27-23 @ 10:40) (98% - 100%)  Wt(kg): --  I&O's Summary    26 Jul 2023 07:01  -  27 Jul 2023 07:00  --------------------------------------------------------  IN: 1320 mL / OUT: 2975 mL / NET: -1655 mL    27 Jul 2023 07:01  -  27 Jul 2023 11:32  --------------------------------------------------------  IN: 120 mL / OUT: 55 mL / NET: 65 mL        	  HEENT:   Normal oral mucosa,   Cardiovascular: Normal S1 S2, No JVD  Respiratory: Lungs clear to auscultation	  Psychiatry: A & O  Gastrointestinal:  Soft, Non-tender, + BS	  	  Neurologic: Non-focal  Extremities: Normal range of motion,     TELEMETRY: 	    ECG:  	  RADIOLOGY:  OTHER: 	  	  LABS:	 	    CARDIAC MARKERS:                                6.7    9.89  )-----------( 146      ( 27 Jul 2023 06:35 )             20.9     07-27    135  |  95<L>  |  83<H>  ----------------------------<  167<H>  3.8   |  24  |  5.45<H>    Ca    8.7      27 Jul 2023 06:36  Phos  5.0     07-27  Mg     2.2     07-27      proBNP:   Lipid Profile:   HgA1c:   TSH:

## 2023-07-27 NOTE — PROGRESS NOTE ADULT - ASSESSMENT
54 yo M with hx of DM2 CAD, CHF, s/p CABG 2015, AICD (2016), on hemodialysis for approximately 6 years via L AVF (nephrologist Dr. Bharath Romo), He has hx of PVD, is s/p 4 stents in R leg (mid and distal right superficial femoral artery, right popliteal x2 on 1/14/22).; is sp RT big toe and second toe amputation 2021, on asa and plavix for severe PVD (per pt).   Now s/p DDRT on 7/16    s/p DDRT  - Post-op US w/ patent vasculature but patient was more oliguric  7/25 responding well to lasix on 7/26. No big changes on repeated US.   -  Lasix 80 BID PO  - DGF requiring hemodialysis 7/17, 7/19, 7/20, 7/21 and 7/26. Will be discharged on HD.   - H&H low at 6.7/20.1 this morning, no evidence of bleeding, drains serours. Will order 1 unit today prior DC and a dose of Epo.     Immunosuppression:   - Switched from Simulect to Thymoglobulin, completed on 7/22/23  - Envarsus by level,   - Tacro level 5.4 today so increasing Envarsus from 6 mg -> 7 mg daily. Pending today  - Valcyte every other day (MWF)    DM:   - Monitor FS, patient now on ISS  - Insulin adjusted with better FS control. Endocrine recommendations appreciated.     PAD:   - Aspirin restarted  - Plavix will continue to be held until final decision of biopsy as outpatient.     LUE swelling.   - s/p LUE fistulogram with balloon angioplasty.  - Continue Aspirin, no full dose AC required  - Plavix held for possible biopsy of for eval of DGF    [id number]  Dispo: DC home today with drain, on HD. After 1 unit of PRBC is given.

## 2023-07-28 VITALS
HEART RATE: 78 BPM | TEMPERATURE: 98 F | SYSTOLIC BLOOD PRESSURE: 130 MMHG | OXYGEN SATURATION: 97 % | WEIGHT: 218.7 LBS | DIASTOLIC BLOOD PRESSURE: 60 MMHG | RESPIRATION RATE: 18 BRPM

## 2023-07-28 LAB
ANION GAP SERPL CALC-SCNC: 16 MMOL/L — SIGNIFICANT CHANGE UP (ref 5–17)
APTT BLD: 27.2 SEC — SIGNIFICANT CHANGE UP (ref 24.5–35.6)
BUN SERPL-MCNC: 90 MG/DL — HIGH (ref 7–23)
CALCIUM SERPL-MCNC: 9.4 MG/DL — SIGNIFICANT CHANGE UP (ref 8.4–10.5)
CHLORIDE SERPL-SCNC: 94 MMOL/L — LOW (ref 96–108)
CO2 SERPL-SCNC: 28 MMOL/L — SIGNIFICANT CHANGE UP (ref 22–31)
CREAT SERPL-MCNC: 5.66 MG/DL — HIGH (ref 0.5–1.3)
EGFR: 11 ML/MIN/1.73M2 — LOW
GLUCOSE BLDC GLUCOMTR-MCNC: 117 MG/DL — HIGH (ref 70–99)
GLUCOSE BLDC GLUCOMTR-MCNC: 163 MG/DL — HIGH (ref 70–99)
GLUCOSE BLDC GLUCOMTR-MCNC: 199 MG/DL — HIGH (ref 70–99)
GLUCOSE SERPL-MCNC: 170 MG/DL — HIGH (ref 70–99)
HCT VFR BLD CALC: 25 % — LOW (ref 39–50)
HGB BLD-MCNC: 8.4 G/DL — LOW (ref 13–17)
MAGNESIUM SERPL-MCNC: 3 MG/DL — HIGH (ref 1.6–2.6)
MCHC RBC-ENTMCNC: 31.2 PG — SIGNIFICANT CHANGE UP (ref 27–34)
MCHC RBC-ENTMCNC: 33.6 GM/DL — SIGNIFICANT CHANGE UP (ref 32–36)
MCV RBC AUTO: 92.9 FL — SIGNIFICANT CHANGE UP (ref 80–100)
NRBC # BLD: 0 /100 WBCS — SIGNIFICANT CHANGE UP (ref 0–0)
PHOSPHATE SERPL-MCNC: 4.2 MG/DL — SIGNIFICANT CHANGE UP (ref 2.5–4.5)
PLATELET # BLD AUTO: 170 K/UL — SIGNIFICANT CHANGE UP (ref 150–400)
POTASSIUM SERPL-MCNC: 4.6 MMOL/L — SIGNIFICANT CHANGE UP (ref 3.5–5.3)
POTASSIUM SERPL-SCNC: 4.6 MMOL/L — SIGNIFICANT CHANGE UP (ref 3.5–5.3)
RBC # BLD: 2.69 M/UL — LOW (ref 4.2–5.8)
RBC # FLD: 17 % — HIGH (ref 10.3–14.5)
SODIUM SERPL-SCNC: 138 MMOL/L — SIGNIFICANT CHANGE UP (ref 135–145)
TACROLIMUS SERPL-MCNC: 7.6 NG/ML — SIGNIFICANT CHANGE UP
WBC # BLD: 10.59 K/UL — HIGH (ref 3.8–10.5)
WBC # FLD AUTO: 10.59 K/UL — HIGH (ref 3.8–10.5)

## 2023-07-28 PROCEDURE — 83036 HEMOGLOBIN GLYCOSYLATED A1C: CPT

## 2023-07-28 PROCEDURE — 87389 HIV-1 AG W/HIV-1&-2 AB AG IA: CPT

## 2023-07-28 PROCEDURE — 82330 ASSAY OF CALCIUM: CPT

## 2023-07-28 PROCEDURE — 71045 X-RAY EXAM CHEST 1 VIEW: CPT

## 2023-07-28 PROCEDURE — 80048 BASIC METABOLIC PNL TOTAL CA: CPT

## 2023-07-28 PROCEDURE — 85610 PROTHROMBIN TIME: CPT

## 2023-07-28 PROCEDURE — C1894: CPT

## 2023-07-28 PROCEDURE — 86706 HEP B SURFACE ANTIBODY: CPT

## 2023-07-28 PROCEDURE — 84295 ASSAY OF SERUM SODIUM: CPT

## 2023-07-28 PROCEDURE — 82803 BLOOD GASES ANY COMBINATION: CPT

## 2023-07-28 PROCEDURE — 76776 US EXAM K TRANSPL W/DOPPLER: CPT

## 2023-07-28 PROCEDURE — 83735 ASSAY OF MAGNESIUM: CPT

## 2023-07-28 PROCEDURE — 86704 HEP B CORE ANTIBODY TOTAL: CPT

## 2023-07-28 PROCEDURE — C2623: CPT

## 2023-07-28 PROCEDURE — 36430 TRANSFUSION BLD/BLD COMPNT: CPT

## 2023-07-28 PROCEDURE — 81256 HFE GENE: CPT

## 2023-07-28 PROCEDURE — 85520 HEPARIN ASSAY: CPT

## 2023-07-28 PROCEDURE — 82565 ASSAY OF CREATININE: CPT

## 2023-07-28 PROCEDURE — C1889: CPT

## 2023-07-28 PROCEDURE — C1725: CPT

## 2023-07-28 PROCEDURE — 36415 COLL VENOUS BLD VENIPUNCTURE: CPT

## 2023-07-28 PROCEDURE — 97112 NEUROMUSCULAR REEDUCATION: CPT

## 2023-07-28 PROCEDURE — 80197 ASSAY OF TACROLIMUS: CPT

## 2023-07-28 PROCEDURE — 93005 ELECTROCARDIOGRAM TRACING: CPT

## 2023-07-28 PROCEDURE — 97535 SELF CARE MNGMENT TRAINING: CPT

## 2023-07-28 PROCEDURE — 85014 HEMATOCRIT: CPT

## 2023-07-28 PROCEDURE — 86850 RBC ANTIBODY SCREEN: CPT

## 2023-07-28 PROCEDURE — 86900 BLOOD TYPING SEROLOGIC ABO: CPT

## 2023-07-28 PROCEDURE — 87340 HEPATITIS B SURFACE AG IA: CPT

## 2023-07-28 PROCEDURE — C1887: CPT

## 2023-07-28 PROCEDURE — 99232 SBSQ HOSP IP/OBS MODERATE 35: CPT | Mod: GC

## 2023-07-28 PROCEDURE — 82962 GLUCOSE BLOOD TEST: CPT

## 2023-07-28 PROCEDURE — 87522 HEPATITIS C REVRS TRNSCRPJ: CPT

## 2023-07-28 PROCEDURE — C2617: CPT

## 2023-07-28 PROCEDURE — 84132 ASSAY OF SERUM POTASSIUM: CPT

## 2023-07-28 PROCEDURE — 83605 ASSAY OF LACTIC ACID: CPT

## 2023-07-28 PROCEDURE — 85027 COMPLETE CBC AUTOMATED: CPT

## 2023-07-28 PROCEDURE — 93990 DOPPLER FLOW TESTING: CPT

## 2023-07-28 PROCEDURE — 82570 ASSAY OF URINE CREATININE: CPT

## 2023-07-28 PROCEDURE — 99232 SBSQ HOSP IP/OBS MODERATE 35: CPT

## 2023-07-28 PROCEDURE — 97166 OT EVAL MOD COMPLEX 45 MIN: CPT

## 2023-07-28 PROCEDURE — 86901 BLOOD TYPING SEROLOGIC RH(D): CPT

## 2023-07-28 PROCEDURE — 99261: CPT

## 2023-07-28 PROCEDURE — 85025 COMPLETE CBC W/AUTO DIFF WBC: CPT

## 2023-07-28 PROCEDURE — 86923 COMPATIBILITY TEST ELECTRIC: CPT

## 2023-07-28 PROCEDURE — 86803 HEPATITIS C AB TEST: CPT

## 2023-07-28 PROCEDURE — 84100 ASSAY OF PHOSPHORUS: CPT

## 2023-07-28 PROCEDURE — 76000 FLUOROSCOPY <1 HR PHYS/QHP: CPT

## 2023-07-28 PROCEDURE — P9040: CPT

## 2023-07-28 PROCEDURE — C8929: CPT

## 2023-07-28 PROCEDURE — 87635 SARS-COV-2 COVID-19 AMP PRB: CPT

## 2023-07-28 PROCEDURE — 85018 HEMOGLOBIN: CPT

## 2023-07-28 PROCEDURE — 80053 COMPREHEN METABOLIC PANEL: CPT

## 2023-07-28 PROCEDURE — 74018 RADEX ABDOMEN 1 VIEW: CPT

## 2023-07-28 PROCEDURE — 97116 GAIT TRAINING THERAPY: CPT

## 2023-07-28 PROCEDURE — 82947 ASSAY GLUCOSE BLOOD QUANT: CPT

## 2023-07-28 PROCEDURE — 85730 THROMBOPLASTIN TIME PARTIAL: CPT

## 2023-07-28 PROCEDURE — C1769: CPT

## 2023-07-28 PROCEDURE — 97530 THERAPEUTIC ACTIVITIES: CPT

## 2023-07-28 PROCEDURE — 97162 PT EVAL MOD COMPLEX 30 MIN: CPT

## 2023-07-28 PROCEDURE — 82435 ASSAY OF BLOOD CHLORIDE: CPT

## 2023-07-28 PROCEDURE — C1751: CPT

## 2023-07-28 RX ADMIN — Medication 81 MILLIGRAM(S): at 12:35

## 2023-07-28 RX ADMIN — Medication 80 MILLIGRAM(S): at 05:33

## 2023-07-28 RX ADMIN — TACROLIMUS 8 MILLIGRAM(S): 5 CAPSULE ORAL at 10:14

## 2023-07-28 RX ADMIN — Medication 80 MILLIGRAM(S): at 17:10

## 2023-07-28 RX ADMIN — VALGANCICLOVIR 450 MILLIGRAM(S): 450 TABLET, FILM COATED ORAL at 05:28

## 2023-07-28 RX ADMIN — Medication 5 MILLIGRAM(S): at 05:28

## 2023-07-28 RX ADMIN — SEVELAMER CARBONATE 800 MILLIGRAM(S): 2400 POWDER, FOR SUSPENSION ORAL at 12:35

## 2023-07-28 RX ADMIN — Medication 6 UNIT(S): at 08:52

## 2023-07-28 RX ADMIN — Medication 500000 UNIT(S): at 12:34

## 2023-07-28 RX ADMIN — Medication 2: at 12:55

## 2023-07-28 RX ADMIN — Medication 2: at 08:53

## 2023-07-28 RX ADMIN — MYCOPHENOLATE MOFETIL 1 GRAM(S): 250 CAPSULE ORAL at 07:56

## 2023-07-28 RX ADMIN — Medication 500000 UNIT(S): at 05:28

## 2023-07-28 RX ADMIN — Medication 500000 UNIT(S): at 00:16

## 2023-07-28 RX ADMIN — Medication 500000 UNIT(S): at 17:10

## 2023-07-28 RX ADMIN — SEVELAMER CARBONATE 800 MILLIGRAM(S): 2400 POWDER, FOR SUSPENSION ORAL at 07:56

## 2023-07-28 RX ADMIN — Medication 1 TABLET(S): at 12:35

## 2023-07-28 RX ADMIN — FAMOTIDINE 20 MILLIGRAM(S): 10 INJECTION INTRAVENOUS at 12:35

## 2023-07-28 NOTE — PROGRESS NOTE ADULT - PROBLEM SELECTOR PROBLEM 3
Anemia
Hyperkalemia
Hyperlipidemia
Anemia
Hyperlipidemia
Anemia
Anemia
Hyperkalemia

## 2023-07-28 NOTE — PROGRESS NOTE ADULT - PROBLEM SELECTOR PLAN 4
Pt. with hyperphosphatemia in the setting of ESRD/DGF. Serum phosphorus is elevated at 5.0 yesterday (7/27). Labs are pending from today. Pt. is receiving Sevelamer 1600 mg tid. Recommend to continue with phosphorus binder. Low phosphorus diet. Monitor serum phosphorus, goal: 3.5-5.5,    If you have any questions, please feel free to contact me  Enrique Nagel  Nephrology Fellow  [phone] / [employer] Teams(Preferred)  (After 5pm or on weekends please page the on-call fellow)

## 2023-07-28 NOTE — PROGRESS NOTE ADULT - REASON FOR ADMISSION
Possible DDRT
DDRT
DDRT
Possible DDRT

## 2023-07-28 NOTE — PROGRESS NOTE ADULT - SUBJECTIVE AND OBJECTIVE BOX
Progress Note:   · Provider Specialty	Transplant Surgery    Reason for Admission:    Reason for Admission:  · Reason for Admission	Possible DDRT      · Subjective and Objective:   Transplant Surgery - Multidisciplinary Rounds  --------------------------------------------------------------  DDRT    Date: 07/16/2023     POD#12    Patient seen and examined with multidisciplinary team including transplant surgeon  transplant nephrologist Dr. Charlie Corado, pharmacist Jorge Alberto Plunkett, ACPs, and NP Priscilla . Disciplines not in attendance will be notified of the plan.    Interval Events:  - Lantus increased and FS have been well controlled.  - Started sevelamer 800 mg PO TID for hyperphosphatemia  - Lasix changed to PO today  - Hemoglobin downtrending today required 1  unit of PRBC  - HD tolerated well tolerated on 7/28, To have Next HD today    Immunosuppression:  Induction: Simulect -> thymoglobulin  Maintenance immunosuppression: Envarsus 7 mg PO daily, prednisone 5 mg PO daily, MMF 1 g PO BID  Ongoing monitoring for signs of rejection.     Discharge date: today            PAST MEDICAL & SURGICAL HISTORY:  Diabetes mellitus type 2  Foot ulcer due to secondary DM  Coronary artery disease  Acute on chronic systolic heart failure  Breast Reduction at age 18  Toe amputation status, left  S/P CABG (coronary artery bypass graft)  AICD (automatic cardioverter/defibrillator) present      Review of systems  Gen: No weight changes, fatigue, fevers/chills, weakness  Skin: No rashes  Head/Eyes/Ears/Mouth: No headache; Normal hearing; Normal vision w/o blurriness; No sinus pain/discomfort, sore throat  Respiratory: No dyspnea, cough, wheezing, hemoptysis  CV: No chest pain, PND, orthopnea  GI: Mild abdominal pain at surgical incision site; denies diarrhea, constipation, nausea, vomiting, melena, hematochezia  : No increased frequency, dysuria, hematuria, nocturia  MSK: No joint pain/swelling; no back pain; no edema  Neuro: No dizziness/lightheadedness, weakness, seizures, numbness, tingling  Heme: No easy bruising or bleeding  Endo: No heat/cold intolerance  Psych: No significant nervousness, anxiety, stress, depression  All other systems were reviewed and are negative, except as noted.    PHYSICAL EXAM:  Constitutional: Well developed / well nourished  Eyes: Anicteric, PERRLA  ENMT: nc/at  Neck: supple  Respiratory: CTA B/L  Cardiovascular: RRR  Gastrointestinal: Soft, non distended, mild tenderness at the incision site; incision c/d/i; LIANA w/ serosanguineous output  Genitourinary: Voiding spontaneously  Extremities: SCD's in place and working bilaterally, LUE AVF  Vascular: Palpable dp pulses bilaterally  Neurological: A&O x3  Skin: no rashes, ulcerations or lesions;  Musculoskeletal: Moving all extremities  Psychiatric: Responsive          Assessment and Plan:   · Assessment	    MEDICATIONS  (STANDING):  aspirin  chewable 81 milliGRAM(s) Oral daily  atorvastatin 40 milliGRAM(s) Oral at bedtime  chlorhexidine 4% Liquid 1 Application(s) Topical <User Schedule>  famotidine    Tablet 20 milliGRAM(s) Oral daily  furosemide    Tablet 80 milliGRAM(s) Oral two times a day  insulin glargine Injectable (LANTUS) 23 Unit(s) SubCutaneous at bedtime  insulin lispro (ADMELOG) corrective regimen sliding scale   SubCutaneous Before meals and at bedtime  insulin lispro Injectable (ADMELOG) 6 Unit(s) SubCutaneous three times a day before meals  mycophenolate mofetil 1 Gram(s) Oral <User Schedule>  nystatin    Suspension 033388 Unit(s) Swish and Swallow four times a day  predniSONE   Tablet 5 milliGRAM(s) Oral daily  senna 2 Tablet(s) Oral at bedtime  sevelamer carbonate 800 milliGRAM(s) Oral three times a day with meals  tacrolimus ER Tablet (ENVARSUS XR) 8 milliGRAM(s) Oral <User Schedule>  trimethoprim   80 mG/sulfamethoxazole 400 mG 1 Tablet(s) Oral daily  valGANciclovir 450 milliGRAM(s) Oral <User Schedule>    MEDICATIONS  (PRN):  dextrose Oral Gel 15 Gram(s) Oral once PRN Blood Glucose LESS THAN 70 milliGRAM(s)/deciliter  traMADol 25 milliGRAM(s) Oral every 6 hours PRN Moderate Pain (4 - 6)  traMADol 50 milliGRAM(s) Oral every 6 hours PRN Severe Pain (7 - 10)      PAST MEDICAL & SURGICAL HISTORY:  Diabetes mellitus  type 2      Foot ulcer due to secondary DM      Coronary artery disease      Acute on chronic systolic heart failure      Breast Reduction  at age 18      Toe amputation status, left      S/P CABG (coronary artery bypass graft)      AICD (automatic cardioverter/defibrillator) present          Vital Signs Last 24 Hrs  T(C): 36.5 (28 Jul 2023 08:59), Max: 36.9 (28 Jul 2023 01:00)  T(F): 97.7 (28 Jul 2023 08:59), Max: 98.4 (28 Jul 2023 01:00)  HR: 81 (28 Jul 2023 08:59) (81 - 90)  BP: 137/73 (28 Jul 2023 08:59) (113/50 - 171/80)  BP(mean): --  RR: 18 (28 Jul 2023 08:59) (18 - 18)  SpO2: 100% (28 Jul 2023 08:59) (98% - 100%)    Parameters below as of 28 Jul 2023 08:59  Patient On (Oxygen Delivery Method): room air        I&O's Summary    27 Jul 2023 07:01  -  28 Jul 2023 07:00  --------------------------------------------------------  IN: 960 mL / OUT: 950 mL / NET: 10 mL    28 Jul 2023 07:01  -  28 Jul 2023 10:37  --------------------------------------------------------  IN: 120 mL / OUT: 100 mL / NET: 20 mL                              6.7    9.89  )-----------( 146      ( 27 Jul 2023 06:35 )             20.9     07-27    135  |  95<L>  |  83<H>  ----------------------------<  167<H>  3.8   |  24  |  5.45<H>    Ca    8.7      27 Jul 2023 06:36  Phos  5.0     07-27  Mg     2.2     07-27      Tacrolimus (), Serum: 6.1 ng/mL (07-27 @ 06:36)              52 yo M with hx of DM2 CAD, CHF, s/p CABG 2015, AICD (2016), on hemodialysis for approximately 6 years via L AVF (nephrologist Dr. Bharath Romo), He has hx of PVD, is s/p 4 stents in R leg (mid and distal right superficial femoral artery, right popliteal x2 on 1/14/22).; is sp RT big toe and second toe amputation 2021, on asa and plavix for severe PVD (per pt).   Now s/p DDRT on 7/16    s/p DDRT  - Post-op US w/ patent vasculature but patient was more oliguric  7/25 responding well to lasix on 7/26. No big changes on repeated US.   -  Lasix 80 BID PO  - DGF requiring hemodialysis 7/17, 7/19, 7/20, 7/21 and 7/26. Will be discharged on HD.   - H&H low at 6.7/20 on 7/27 Received 1U PRBCs   -GI: Constipation that has not responded to Laxatives yesterday. Will have extra Water enema today possible bedside disimpaction     Immunosuppression:   - Switched from Simulect to Thymoglobulin, completed on 7/22/23  - Envarsus by level,   - Tacro level Pending today .On Envarsus  7 mg daily today  - Valcyte every other day (MWF)    DM:   - Monitor FS, patient now on ISS  - Insulin adjusted with better FS control. Endocrine recommendations appreciated.     PAD:   - Aspirin restarted  - Plavix will continue to be held until final decision of biopsy as outpatient.     LUE swelling.   - s/p LUE fistulogram with balloon angioplasty.  - Continue Aspirin, no full dose AC required  - Plavix held for possible biopsy of for eval of DGF    [id number]  Dispo: DC home today with drain, on HD. After 1 unit of PRBC is given.

## 2023-07-28 NOTE — PROGRESS NOTE ADULT - PROVIDER SPECIALTY LIST ADULT
Cardiology
Cardiology
Nephrology
Transplant Nephrology
Transplant Surgery
Vascular Surgery
Vascular Surgery
Cardiology
Internal Medicine
Nephrology
Nephrology
SICU
Transplant Surgery
Cardiology
Internal Medicine
Nephrology
Pharmacy
SICU
Transplant Nephrology
Transplant Surgery
Vascular Surgery
Cardiology
Internal Medicine
Nephrology
Transplant Nephrology
Transplant Nephrology
Transplant Surgery
Endocrinology
Transplant Nephrology
Endocrinology
Transplant Nephrology

## 2023-07-28 NOTE — PROGRESS NOTE ADULT - PROBLEM SELECTOR PLAN 3
Currently on Atorvastatin 40 mg daily   Goal LDL < 70      Contact via [employer] Teams during business hours  To reach covering provider access AMION via sunrise tools  For Urgent matters/after-hours/weekends/holidays please page endocrine fellow on call   For nonurgent matters please email [email or web address]    Please note that this patient may be followed by different provider tomorrow.  Notify endocrine 24 hours prior to discharge for final recommendations

## 2023-07-28 NOTE — PROGRESS NOTE ADULT - PROBLEM SELECTOR PLAN 1
Pt. with h/o ESRD on HD. Underwent DDRT on 7/16/23. Post-transplant course significant for DGF, pt. received first HD treatment on 7/17, last on 7/26. Pt. is non-oliguric, documented urine output is 825 cc over the past 24 hours on PO Lasix. Pt. clinically stable. Labs reviewed. Plan for HD treatment today. Continue with immunosuppression regimen, as outlined below. Continue with Lasix 80 mg PO BID. Pt. will require HD following discharge. Monitor labs and urine output. Avoid nephrotoxins. Dose medications as per eGFR.

## 2023-07-28 NOTE — PROGRESS NOTE ADULT - PROBLEM SELECTOR PROBLEM 2
Immunosuppression
Hypertension
Immunosuppression
Immunosuppression
Hypertension
Immunosuppression

## 2023-07-28 NOTE — PROGRESS NOTE ADULT - PROBLEM SELECTOR PLAN 1
-Test BGs ACTID and at HS  - Continue with Lantus 23 U at HS   - Continue with Lispro 6 U TID with meals, Hold if not eating or NPO  - c/w mod scale with meals and low dose at night   - Carbohydrate controlled diet, no concentrated sweets  - RD consult for education about consistent carbohydrate diet   - Please keep hypoglycemia protocol in place     DISCHARGE:   -Discharge home today  - Can be discharged on current insulin regimen ( Lantus 23 units at HS and premeal Admelog 6 units ACTID, hold if not eating)  - Patient to follow up with PCP in one week to review BGs and adjust insulin doses. pt to contact PCP for BG> 400,  BGs >200 for 2 days or BG < 70 X1.  -pt has inpt medicare and medicaid. [facility] endocrine faculty do not accept Medicaid. pt can follow up at [facility] located in [facility] on [address] phone :[phone]. Email sent to assist scheduling an appointment   -

## 2023-07-28 NOTE — PROGRESS NOTE ADULT - ATTENDING COMMENTS
I personally saw and examined patient.  Alert and oriented.  Abdomen soft and non-tender.  Wound clean and dry.    IMMUNOSUPPRESSION MANAGEMENT  Envarsus level today: 5.4  Envarsus level yesterday: not obtained (transplanted overnight)  Envarsus dose: 8 mg by mouth daily  Aim for level of 8-10  Envarsus dose management: No change in current dose.
I personally saw and examined patient.  Kidney transplant recipient.  Alert, oriented, responsive.  Abdomen soft and non-tender    IMMUNOSUPPRESSION MANAGEMENT:  Envarsus level today: 9.2  Envarsus level yesterday: 9.1  Envarsus dose:  6 mg by mouth daily  Envarsus dose management:  No change in current dose  Also on Thymoglobulin
I personally saw and examined patient.  Kidney transplant recipient.  Alert, oriented, responsive.  Abdomen soft and non-tender.  Wound clean and dry.    IMMUNOSUPPRESSION MANAGEMENT:  Envarsus level today: 8.3  Envarsus level yesterday: 7.1  Aim for level of 8-10  Current envarsus dose: 6 mg by mouth daily.  Envarsus dose management: No change in current dose
POD#0 s/p DDRT  making minimal urine. Being weaned off pressors  remains on insulin gtt  Immunosuppression - Envarsus 8 mg daily, Steroid taper, Cellcept 1gm bid.   Will check tacrolimus level and adjust dose for level ~8  Transplant Prophylaxis with nystatin, bactrim, valcyte  GI prophylaxis due to steroids  monitor glucose levels and adjust insulin as necessary   monitor electrolytes - may need HD if no improvement in hyperkalemia
I personally saw and examined patient.  Kidney transplant recipient.  Alert, oriented, responsive.  Abdomen soft and non-tender.  Wound clean and dry.    IMMUNOSUPPRESSION MANAGEMENT:  Envarsus level today: 13.6  Envarsus level yesterday: 5.4  Aim for level of 8-10  Current envarsus dose: 8 mg by mouth daily.  Envarsus dose management: hold dose until AM level is available.  will determine dose at the time based on level.
I personally saw and examined patient.  Kidney transplant recipient.  Alert, oriented, responsive.  Abdomen soft and non-tender.  Wound clean and dry.    IMMUNOSUPPRESSION MANAGEMENT:  Envarsus level today: 9.1  Envarsus level yesterday: 14.9  Aim for level of 8-10  Current envarsus dose:  Being held  Envarsus dose management: change to 6 mg by mouth daily.     currently experiencing delayed graft function.  Started Thymoglobulin
SICU ATTENDING ATTESTATION    I have seen and examined this patient on multidisciplinary SICU rounds thismorning. I have reviewed all new labs, imaging and reports. I have participated in formulating the plan for the day, and have read and agree with the history, ROS, exam, assessment and plan as stated above, with my additions listed below:     POD 2 DDRT with delayed graft function.   HD yesterday for hyperkalemia. Improved today.   Add home Coreg back today, wean midodrine as able.   hold home lisinopril.   regular diet  pain well controlled.   Medically ready for transition to floor from ICU.     Total time spent in the care of this patient today (excluding procedures & teaching): 15 min                 Over 50% of the total time was spent in discussion and coordination of care with consulting services, dietary and rehab services.       Rhina Rodgers M.D., M.S.  Dept of Trauma, Acute and Critical Care
fistulagram to address central vein stenosis
53 yr old man with ESRD on HD for 6 years via left AVF. Nephrologist Dr. Bharath Romo.   PMH: DM2, CAD, CHF, s/p CABG 2015, AICD (2016),  PVD, is s/p 4 stents in R leg (mid and distal right superficial femoral artery, right popliteal x2 on 1/14/22).; is sp RT big toe and second toe amputation 2021, on asa and plavix for severe PVD.     S/p DDRT on 7/14/23 on the left side.   Donor 59 yrs old, KDPI 83%, Terminal Cr 0.8. Donor CMV +, EBV +. Single vessels and ureter stented. CIT 16 hours. Required 2 units PRBC intraop. Transferred to SICU post op due to hypotension requiring vasopressors.     HD done yesterday, tolerated 2 liters UF.  Still oliguric. Edema improving   Started heparin drip for thrombus in left subclavian. Planned for thrombectomy and fistuloplasty on Monday 7/23 by vascular     1. 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    Remains oliguric, volume overload present. S/p HD yesterday. Will do HD again today , plan fur UF 2 liters.   Continue sevelamer 1600mg po TID for hyperphosphatemia     2. Immunosuppression   Initially received Simulect induction - Changed to Thymoglobulin given delayed graft function. Dose #3 today. CBC reviewed, has anemia and thrombocytopenia but not severe. Will continue to monitor.  Steroid taper per protocol  MMF 1 gram po BID   Envarsus 6mg daily, level 9.2 at goal, trough 8-10     3. Infection prophylaxis   Nystatin, Bactrim, Valcyte renal dose     4. LUE swelling US with left sub clavian vein stenosis and thrombosis. Continue heparin drip -conservative PTT target. Planned for thrombectomy and fistulogram/fistulopasty on Monday     5. CAD s/p CABG/PVD - Continue Aspirin. Holding plavix while on anticoagulation      Coreg to 3.125mg po bid due to low BP.     6. DM2- Increase lantus 20U and admelog 8 units pre meals .    7. Anemia - multifactorial, post op, CKD. Start epo 10,000 units sq weekly
53 yr old man with ESRD on HD for 6 years via left AVF. Nephrologist Dr. Bharath Romo.   PMH: DM2, CAD, CHF, s/p CABG 2015, AICD (2016),  PVD, is s/p 4 stents in R leg (mid and distal right superficial femoral artery, right popliteal x2 on 1/14/22).; is sp RT big toe and second toe amputation 2021, on asa and plavix for severe PVD.     S/p DDRT on 7/14/23 on the left side.   Donor 59 yrs old, KDPI 83%, Terminal Cr 0.8. Donor CMV +, EBV +. Single vessels and ureter stented. CIT 16 hours. Required 2 units PRBC intraop. Transferred to SICU post op due to hypotension requiring vasopressors.     Weaned off IV vasopressors, now on po midodrine.  Received 1 unit PRBC yesterday.   Had HD yesterday. LUE swelling pos HD.     1. S/p DDRT on 7/14/23   Course complicated by hypotension requiring DGF s/p HD yesterday.   Remains oligoanuria, no response to lasix yesterday.  volume status and electrolytes acceptable. No need for HD today. Will plan for HD tomorrow.   Start sevelamer 1600mg po TID for hyperphosphatemia     2. Immunosuppression   Simulect induction   Steroid taper per protocol  MMF 1 gram po BID   Envarsus per level, goal trough 8-10     3. Infection prophylaxis   Nystatin, Bactrim, Valcyte renal dose      4. CAD/PVD - aspirin resumed. Will resume plavix if H/H remains stable tomorrow      D/c Coreg due to low BP. Can start low dose metoprolol 12.5mg po bid      Continue midodrine 10mg po q8h, maintain MAP >80mmHg for renal perfusion.     5. DM2- Controlled. Continue Admelog sliding scale for now.     6. LUE arm swelling - obtain ultrasound
53 yr old man with ESRD on HD for 6 years via left AVF. Nephrologist Dr. Bharath Romo.   PMH: DM2, CAD, CHF, s/p CABG 2015, AICD (2016),  PVD, is s/p 4 stents in R leg (mid and distal right superficial femoral artery, right popliteal x2 on 1/14/22).; is sp RT big toe and second toe amputation 2021, on asa and plavix for severe PVD.     S/p DDRT on 7/14/23 on the left side.   Donor 60 yrs old, KDPI 83%, Terminal Cr 0.8. Donor CMV +, EBV +. Single vessels and ureter stented. CIT 16 hours. Required 2 units PRBC intraop. Transferred to SICU post op due to hypotension requiring vasopressors.     HD done yesterday, tolerated 2.3 liters UF. Feels better today. Still oliguric.   Edema improving     1. 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    Remains oliguric, volume overload present. S/p HD yesterday. Will do HD again today , plan fur UF 2 liters.   Will plan for HD tomorrow - his outpatient schedule is MW   Start sevelamer 1600mg po TID for hyperphosphatemia     2. Immunosuppression   Initially received Simulect induction - Changed to Thymoglobulin given delayed graft function. Dose #2 today   Steroid taper per protocol  MMF 1 gram po BID   Envarsus per level, goal trough 8-10     3. Infection prophylaxis   Nystatin, Bactrim, Valcyte renal dose     4. LUE swelling US with left sub clavian vein stenosis and thrombosis. Vascular follow up. Start anticoagulation heparin vs Eliquis      5. CAD s/p CABG/PVD - Continue Aspirin. Holding plavix while on anticoagulation      Reduce Coreg to 3.125mg po bid due to low BP.     6. DM2- Continue lantus 15U and admelog 5 units pre meals
ATN of transplant  UO decreased and BUN rising.  Plan for dialysis today  Hb low - repeat and transfuse if needed.  Check renal sonogram.    Cont current immunosuppression.
ATN of transplant  UO improved and sonogram ok.   Hb low - transfuse and will d/c home.     Cont current immunosuppression.
ATN of transplant  UO improving  Plan for IV lasix today, hold dialysis and reassess tomorrow.    Cont current immunosuppression.
Pt feels constipated  UO dropped  Plan dialysis today  Tap water enema for constipation, may need disimpaction.  Blood sugars improved.
Delayed graft function likely due to ATN in the setting of post op hypotension requiring vasopressors.   BP improved, fluid overload present on exam.   Repeat transplant US today   Vascular f/u for evaluation of AVF   Will do HD today. Will do ultrafiltration 2.5 liters as tolerated.   Pt received Simulect for induction but given DGF will give Thymo. Discussed with Dr. Vizcaino .
ATN of transplant  UO improving  Plan for IV lasix today, hold dialysis and reassess tomorrow.    Cont current immunosuppression.

## 2023-07-28 NOTE — PROGRESS NOTE ADULT - PROBLEM SELECTOR PLAN 3
Pt. with anemia in the setting of ESRD/DGF. Hgb was low at 6.7 yesterday. Pt. received PRBC transfusion and EPO yesterday (7/27). CBC is pending from today. Continue with weekly EPO. Monitor hgb.

## 2023-07-28 NOTE — PROGRESS NOTE ADULT - PROBLEM SELECTOR PROBLEM 1
Type 2 diabetes mellitus with hyperglycemia
Kidney transplant recipient
Kidney transplant recipient
Type 2 diabetes mellitus with hyperglycemia
Kidney transplant recipient

## 2023-07-28 NOTE — PROGRESS NOTE ADULT - SUBJECTIVE AND OBJECTIVE BOX
[facility]   MD DANIEL WALDEN MD RUORU WONG, PA    TEL:  FROM 9 AM to 5 PM ---OFFICE: [phone]    FROM 5 PM - 9 AM PLEASE CALL ANSWERING SERVICE: [phone]    RENAL FOLLOW UP NOTE--Date of Service 07-28-23 @ 08:05  --------------------------------------------------------------------------------  HPI:      Pt seen and examined at bedside.       PAST HISTORY  --------------------------------------------------------------------------------  No significant changes to PMH, PSH, FHx, SHx, unless otherwise noted    ALLERGIES & MEDICATIONS  --------------------------------------------------------------------------------  Allergies    Contrast. (Unknown)    Intolerances      Standing Inpatient Medications  aspirin  chewable 81 milliGRAM(s) Oral daily  atorvastatin 40 milliGRAM(s) Oral at bedtime  chlorhexidine 4% Liquid 1 Application(s) Topical <User Schedule>  famotidine    Tablet 20 milliGRAM(s) Oral daily  furosemide    Tablet 80 milliGRAM(s) Oral two times a day  insulin glargine Injectable (LANTUS) 23 Unit(s) SubCutaneous at bedtime  insulin lispro (ADMELOG) corrective regimen sliding scale   SubCutaneous Before meals and at bedtime  insulin lispro Injectable (ADMELOG) 6 Unit(s) SubCutaneous three times a day before meals  mycophenolate mofetil 1 Gram(s) Oral <User Schedule>  nystatin    Suspension 976839 Unit(s) Swish and Swallow four times a day  predniSONE   Tablet 5 milliGRAM(s) Oral daily  senna 2 Tablet(s) Oral at bedtime  sevelamer carbonate 800 milliGRAM(s) Oral three times a day with meals  tacrolimus ER Tablet (ENVARSUS XR) 8 milliGRAM(s) Oral <User Schedule>  trimethoprim   80 mG/sulfamethoxazole 400 mG 1 Tablet(s) Oral daily  valGANciclovir 450 milliGRAM(s) Oral <User Schedule>    PRN Inpatient Medications  dextrose Oral Gel 15 Gram(s) Oral once PRN  traMADol 25 milliGRAM(s) Oral every 6 hours PRN  traMADol 50 milliGRAM(s) Oral every 6 hours PRN      REVIEW OF SYSTEMS  --------------------------------------------------------------------------------  General: no fever  MSK: no edema     VITALS/PHYSICAL EXAM  --------------------------------------------------------------------------------  T(C): 36.6 (07-28-23 @ 05:00), Max: 36.9 (07-28-23 @ 01:00)  HR: 89 (07-28-23 @ 05:00) (86 - 95)  BP: 144/64 (07-28-23 @ 05:00) (111/51 - 171/80)  RR: 18 (07-28-23 @ 05:00) (18 - 18)  SpO2: 98% (07-28-23 @ 05:00) (98% - 100%)  Wt(kg): --        07-27-23 @ 07:01  -  07-28-23 @ 07:00  --------------------------------------------------------  IN: 960 mL / OUT: 950 mL / NET: 10 mL      Physical Exam:  	Gen: NAD  	HEENT: MMM  	Pulm: CTA B/L  	CV: S1S2  	Abd: Soft, +BS  	Ext: No LE edema B/L                      Neuro: Awake   	Skin: Warm and Dry   	Vascular access: avf           no kalee  LABS/STUDIES  --------------------------------------------------------------------------------              6.7    9.89  >-----------<  146      [07-27-23 @ 06:35]              20.9     135  |  95  |  83  ----------------------------<  167      [07-27-23 @ 06:36]  3.8   |  24  |  5.45        Ca     8.7     [07-27-23 @ 06:36]      Mg     2.2     [07-27-23 @ 06:36]      Phos  5.0     [07-27-23 @ 06:36]        PTT: 26.4       [07-27-23 @ 06:36]      Creatinine Trend:  SCr 5.45 [07-27 @ 06:36]  SCr 8.03 [07-26 @ 07:03]  SCr 6.83 [07-25 @ 06:46]  SCr 6.30 [07-24 @ 05:06]  SCr 5.58 [07-23 @ 06:32]    Urinalysis - [07-27-23 @ 06:36]      Color  / Appearance  / SG  / pH       Gluc 167 / Ketone   / Bili  / Urobili        Blood  / Protein  / Leuk Est  / Nitrite       RBC  / WBC  / Hyaline  / Gran  / Sq Epi  / Non Sq Epi  / Bacteria         HBsAb 5.7      [07-15-23 @ 21:22]  HBsAg Nonreact      [07-15-23 @ 21:21]  HBcAb Nonreact      [07-15-23 @ 21:22]  HCV 0.16, Nonreact      [07-15-23 @ 21:22]  HIV Nonreact      [07-15-23 @ 21:21]

## 2023-07-28 NOTE — PROGRESS NOTE ADULT - ASSESSMENT
54 yo M with h/o ESRD on HD x6 years via LUE AVF (outpatient nephrologist: Dr. Bharath Romo), DM2 CAD, CHF, underwent CABG 2015, AICD (2016), and severe PVD (underwent R big toe and second toe amputation in 2021) initially presented for kidney transplant. Pt. underwent DDRT on 7/16. Pt. was transferred to SICU for IV vasopressor support, now off and transferred to medical floor. Post-transplant course significant for DGF. Pt. received first HD treatment on 7/17, last on 7/21. Pt underwent fistulogram with angioplasty on 7/24. Endocrinology team consulted for DM management.   He is on HD post tx. BG Goal 100-180mg/dl. BGs mostly at goal  in 100s. On Prednisone 5 mg daily and Tacrolimus ER 8 mg daily. Likely discharge after HD today. Encouraged patient to continue to monitor BGs ACTID and at HS at home, contact PCP for BG> 400 or < 70, or BG > 200 X 2 days and follow up with PCP and endocrinologist       A1c: 5.8%, not reliable in HD   Home meds: Januvia 100 mg daily

## 2023-07-28 NOTE — PROGRESS NOTE ADULT - ASSESSMENT
Patient is a 53y Male  with hx of DM2 CAD, CHF, s/p CABG 2015, AICD (2016), on hemodialysis for approximately 6 years via L AVF (nephrologist Dr. Bharath Romo), He has hx of PVD, is s/p 4 stents in R leg (mid and distal right superficial femoral artery, right popliteal x2 on 1/14/22).  s/p  RT big toe and second toe amputation 2021, on asa and plavix for severe PVD (per pt).   Patient is s/p DDRT 7/16, requiring levophed drip and insulin drip post op with hyperkalemia, transferred to SICU          Post op hypotension/ resolved    - diet as tolerated  - Bowel regimen for constipation    Renal: s/p DDRT for ESRD 2/2 DM  -has left arm fistula,   hyperkalemia better  hd as per renal  - care per renal transplant regarding: tc / meds  - Monitor I&Os and electrolytes  - replete electrolytes as needed   hd as per renal /      - Monitor CBC and coags/transfuse 1 unit today   -  VTE prophylaxis: hold  -SCD's        Endo: hx of DM,   - Hyperglycemia improved  endo eval noted      steroid taper \    ?renal biopsy will be decided as outpt       -oob    d/c planning

## 2023-07-28 NOTE — PROGRESS NOTE ADULT - SUBJECTIVE AND OBJECTIVE BOX
Subjective: Patient seen and examined. No new events except as noted.     SUBJECTIVE/ROS:        MEDICATIONS:  MEDICATIONS  (STANDING):  aspirin  chewable 81 milliGRAM(s) Oral daily  atorvastatin 40 milliGRAM(s) Oral at bedtime  chlorhexidine 4% Liquid 1 Application(s) Topical <User Schedule>  famotidine    Tablet 20 milliGRAM(s) Oral daily  furosemide    Tablet 80 milliGRAM(s) Oral two times a day  insulin glargine Injectable (LANTUS) 23 Unit(s) SubCutaneous at bedtime  insulin lispro (ADMELOG) corrective regimen sliding scale   SubCutaneous Before meals and at bedtime  insulin lispro Injectable (ADMELOG) 6 Unit(s) SubCutaneous three times a day before meals  mycophenolate mofetil 1 Gram(s) Oral <User Schedule>  nystatin    Suspension 522131 Unit(s) Swish and Swallow four times a day  predniSONE   Tablet 5 milliGRAM(s) Oral daily  senna 2 Tablet(s) Oral at bedtime  sevelamer carbonate 800 milliGRAM(s) Oral three times a day with meals  tacrolimus ER Tablet (ENVARSUS XR) 8 milliGRAM(s) Oral <User Schedule>  trimethoprim   80 mG/sulfamethoxazole 400 mG 1 Tablet(s) Oral daily  valGANciclovir 450 milliGRAM(s) Oral <User Schedule>      PHYSICAL EXAM:  T(C): 36.5 (07-28-23 @ 08:59), Max: 36.9 (07-28-23 @ 01:00)  HR: 81 (07-28-23 @ 08:59) (81 - 90)  BP: 137/73 (07-28-23 @ 08:59) (115/52 - 171/80)  RR: 18 (07-28-23 @ 08:59) (18 - 18)  SpO2: 100% (07-28-23 @ 08:59) (98% - 100%)  Wt(kg): --  I&O's Summary    27 Jul 2023 07:01  -  28 Jul 2023 07:00  --------------------------------------------------------  IN: 960 mL / OUT: 950 mL / NET: 10 mL    28 Jul 2023 07:01  -  28 Jul 2023 10:40  --------------------------------------------------------  IN: 120 mL / OUT: 100 mL / NET: 20 mL            JVP: Normal  Neck: supple  Lung: clear   CV: S1 S2 , Murmur:  Abd: soft  Ext: No edema  neuro: Awake / alert  Psych: flat affect  Skin: normal``    LABS/DATA:    CARDIAC MARKERS:                                6.7    9.89  )-----------( 146      ( 27 Jul 2023 06:35 )             20.9     07-27    135  |  95<L>  |  83<H>  ----------------------------<  167<H>  3.8   |  24  |  5.45<H>    Ca    8.7      27 Jul 2023 06:36  Phos  5.0     07-27  Mg     2.2     07-27      proBNP:   Lipid Profile:   HgA1c:   TSH:     TELE:  EKG:

## 2023-07-28 NOTE — PROGRESS NOTE ADULT - NS ATTEND OPT1 GEN_ALL_CORE

## 2023-07-28 NOTE — PROGRESS NOTE ADULT - SUBJECTIVE AND OBJECTIVE BOX
DATE OF SERVICE: 07-28-23 @ 10:59  CHIEF COMPLAINT:Patient is a 53y old  Male who presents with a chief complaint of Possible DDRT (28 Jul 2023 10:34)    	        PAST MEDICAL & SURGICAL HISTORY:  Diabetes mellitus  type 2      Foot ulcer due to secondary DM      Coronary artery disease      Acute on chronic systolic heart failure      Breast Reduction  at age 18      Toe amputation status, left      S/P CABG (coronary artery bypass graft)      AICD (automatic cardioverter/defibrillator) present              REVIEW OF SYSTEMS:  CONSTITUTIONAL: No fever, weight loss, or fatigue  EYES: No eye pain, visual disturbances, or discharge  NECK: No pain or stiffness  RESPIRATORY: No cough, wheezing, chills or hemoptysis; No Shortness of Breath  CARDIOVASCULAR: No chest pain, palpitations, passing out, dizziness, or leg swelling  GASTROINTESTINAL: No abdominal or epigastric pain.   + constipation  GENITOURINARY: No dysuria, frequency, hematuria, or incontinence  NEUROLOGICAL: No headaches    Medications:  MEDICATIONS  (STANDING):  aspirin  chewable 81 milliGRAM(s) Oral daily  atorvastatin 40 milliGRAM(s) Oral at bedtime  chlorhexidine 4% Liquid 1 Application(s) Topical <User Schedule>  famotidine    Tablet 20 milliGRAM(s) Oral daily  furosemide    Tablet 80 milliGRAM(s) Oral two times a day  insulin glargine Injectable (LANTUS) 23 Unit(s) SubCutaneous at bedtime  insulin lispro (ADMELOG) corrective regimen sliding scale   SubCutaneous Before meals and at bedtime  insulin lispro Injectable (ADMELOG) 6 Unit(s) SubCutaneous three times a day before meals  mycophenolate mofetil 1 Gram(s) Oral <User Schedule>  nystatin    Suspension [id number] Unit(s) Swish and Swallow four times a day  predniSONE   Tablet 5 milliGRAM(s) Oral daily  senna 2 Tablet(s) Oral at bedtime  sevelamer carbonate 800 milliGRAM(s) Oral three times a day with meals  tacrolimus ER Tablet (ENVARSUS XR) 8 milliGRAM(s) Oral <User Schedule>  trimethoprim   80 mG/sulfamethoxazole 400 mG 1 Tablet(s) Oral daily  valGANciclovir 450 milliGRAM(s) Oral <User Schedule>    MEDICATIONS  (PRN):  dextrose Oral Gel 15 Gram(s) Oral once PRN Blood Glucose LESS THAN 70 milliGRAM(s)/deciliter  traMADol 25 milliGRAM(s) Oral every 6 hours PRN Moderate Pain (4 - 6)  traMADol 50 milliGRAM(s) Oral every 6 hours PRN Severe Pain (7 - 10)    	    PHYSICAL EXAM:  T(C): 36.5 (07-28-23 @ 08:59), Max: 36.9 (07-28-23 @ 01:00)  HR: 81 (07-28-23 @ 08:59) (81 - 90)  BP: 137/73 (07-28-23 @ 08:59) (115/52 - 171/80)  RR: 18 (07-28-23 @ 08:59) (18 - 18)  SpO2: 100% (07-28-23 @ 08:59) (98% - 100%)  Wt(kg): --  I&O's Summary    27 Jul 2023 07:01  -  28 Jul 2023 07:00  --------------------------------------------------------  IN: 960 mL / OUT: 950 mL / NET: 10 mL    28 Jul 2023 07:01  -  28 Jul 2023 10:59  --------------------------------------------------------  IN: 120 mL / OUT: 100 mL / NET: 20 mL        Appearance: Normal	  HEENT:   Normal oral mucosa, PERRL, EOMI	    Cardiovascular: Normal S1 S2, No JVD,   Respiratory: Lungs clear to auscultation	  Psychiatry: A & O   Gastrointestinal:  Soft, Non-tender, + BS	    Neurologic: Non-focal  Extremities: Normal range of motion,   TELEMETRY: 	    ECG:  	  RADIOLOGY:  OTHER: 	  	  LABS:	 	    CARDIAC MARKERS:                                6.7    9.89  )-----------( 146      ( 27 Jul 2023 06:35 )             20.9     07-27    135  |  95<L>  |  83<H>  ----------------------------<  167<H>  3.8   |  24  |  5.45<H>    Ca    8.7      27 Jul 2023 06:36  Phos  5.0     07-27  Mg     2.2     07-27      proBNP:   Lipid Profile:   HgA1c:   TSH:

## 2023-07-28 NOTE — PROGRESS NOTE ADULT - NS ATTEND AMEND GEN_ALL_CORE FT
I personally saw and examined patient.  Kidney transplant recipient.  Alert. oriented, responsive.  Abdomen soft and non-tender.    IMMUNOSUPPRESSION MANAGEMENT:  Envarsus level today: 7.1  Envarsus level yesterday: 9.2  Aim for level of: 8-10  Envarsus dose: 6  mg by mouth daily.  Envarsus dose management:  No change in current dose.
as above
DGF graft. constipation.  immuno: tac/cellcept/pred
I personally saw and examined patient.  Alert, oriented, responsive.  Abdomen soft and non-tender.  Kidney transplant recipient.    IMMUNOSUPPRESSION MANAGEMENT:  Envarsus level today: 9.6  Envarsus level yesterday: 8.3  Envarsus dose:  6 mg by mouth daily  Aim for level of 8-10  Envarsus dose management: No change in current dose.
as above
as above, subclav occlusion chronic, secondary to icd lead.  pt was on dapt prior, should resume asa, then plavix once cleared from transplant perspective.  I do not think pt needs therapeutic anticoagulation
as above
discussed with trasnplant team  pt to continue hd as outtp
DGF, UOP improving. May require additional HD.   Discussed case w vascular -- ASA monotherapy, will need Plavix re-added after potential graft biopsy.  Immuno: tac/cellcept/pred.  Completed Thymo induction.
as above
as above
I personally saw and examined patient.  Kidney transplant recipient.  Alert, oriented, responsive.  Abdomen soft and non-tender.  Wound clean and dry.    IMMUNOSUPPRESSION MANAGEMENT:  Envarsus level today: 14.9  Envarsus level yesterday: 13.6  Aim for level of 8-10  Current envarsus dose:  being held  Envarsus dose management: continue to hold.  Will dose based on AM level

## 2023-07-28 NOTE — PROGRESS NOTE ADULT - SUBJECTIVE AND OBJECTIVE BOX
Seen earlier today     Chief Complaint: Diabetes Mellitus follow up    INTERVAL HX: patient was having HD in his room at the time of visit. He was constipated, but was able to have BM today. Plan for discharge home after HD today. BGs  mostly at goal on current insulin regimen. Noted premeal insulin held for lunch and dinner because pt is not eating       Review of Systems:  General: As above  GI: No nausea, vomiting  Endocrine: no  S&Sx of hypoglycemia    Allergies    Contrast. (Unknown)    Intolerances      MEDICATIONS  (STANDING):  atorvastatin 40 milliGRAM(s) Oral at bedtime  insulin glargine Injectable (LANTUS) 23 Unit(s) SubCutaneous at bedtime  insulin lispro (ADMELOG) corrective regimen sliding scale   SubCutaneous Before meals and at bedtime  insulin lispro Injectable (ADMELOG) 6 Unit(s) SubCutaneous three times a day before meals  mycophenolate mofetil 1 Gram(s) Oral <User Schedule>  predniSONE   Tablet 5 milliGRAM(s) Oral daily  tacrolimus ER Tablet (ENVARSUS XR) 8 milliGRAM(s) Oral <User Schedule>  trimethoprim   80 mG/sulfamethoxazole 400 mG 1 Tablet(s) Oral daily  valGANciclovir 450 milliGRAM(s) Oral <User Schedule>        atorvastatin   40 milliGRAM(s) Oral (07-27-23 @ 22:00)    insulin glargine Injectable (LANTUS)   23 Unit(s) SubCutaneous (07-27-23 @ 22:01)    insulin lispro (ADMELOG) corrective regimen sliding scale   2 Unit(s) SubCutaneous (07-28-23 @ 12:55)   2 Unit(s) SubCutaneous (07-28-23 @ 08:53)   2 Unit(s) SubCutaneous (07-27-23 @ 22:01)   2 Unit(s) SubCutaneous (07-27-23 @ 18:03)    insulin lispro Injectable (ADMELOG)   6 Unit(s) SubCutaneous (07-28-23 @ 08:52)   6 Unit(s) SubCutaneous (07-27-23 @ 18:02)    predniSONE   Tablet   5 milliGRAM(s) Oral (07-28-23 @ 05:28)        PHYSICAL EXAM:  VITALS: T(C): 36.5 (07-28-23 @ 14:40)  T(F): 97.7 (07-28-23 @ 14:40), Max: 98.4 (07-28-23 @ 01:00)  HR: 76 (07-28-23 @ 14:40) (76 - 90)  BP: 141/57 (07-28-23 @ 14:40) (131/71 - 171/80)  RR:  (18 - 18)  SpO2:  (98% - 100%)  Wt(kg): --  GENERAL: male laying in bed, in NAD  Respiratory: Respirations unlabored   Extremities: Warm, no edema  NEURO: Alert , appropriate     LABS:  POCT Blood Glucose.: 117 mg/dL (07-28-23 @ 17:08)  POCT Blood Glucose.: 199 mg/dL (07-28-23 @ 12:43)  POCT Blood Glucose.: 163 mg/dL (07-28-23 @ 08:52)  POCT Blood Glucose.: 166 mg/dL (07-27-23 @ 21:28)  POCT Blood Glucose.: 198 mg/dL (07-27-23 @ 17:48)  POCT Blood Glucose.: 140 mg/dL (07-27-23 @ 12:56)  POCT Blood Glucose.: 159 mg/dL (07-27-23 @ 09:07)  POCT Blood Glucose.: 120 mg/dL (07-27-23 @ 01:18)  POCT Blood Glucose.: 136 mg/dL (07-26-23 @ 21:44)  POCT Blood Glucose.: 143 mg/dL (07-26-23 @ 17:45)  POCT Blood Glucose.: 129 mg/dL (07-26-23 @ 15:40)  POCT Blood Glucose.: 165 mg/dL (07-26-23 @ 08:51)  POCT Blood Glucose.: 274 mg/dL (07-25-23 @ 21:07)                          8.4    10.59 )-----------( 170      ( 28 Jul 2023 10:58 )             25.0     07-28    138  |  94<L>  |  90<H>  ----------------------------<  170<H>  4.6   |  28  |  5.66<H>    Ca    9.4      28 Jul 2023 10:57  Phos  4.2     07-28  Mg     3.0     07-28      eGFR: 11 mL/min/1.73m2 (28 Jul 2023 10:57)      Thyroid Function Tests:      A1C with Estimated Average Glucose Result: 5.8 % (07-26-23 @ 07:25)    Estimated Average Glucose: 120 mg/dL (07-26-23 @ 07:25)        Diet, Renal Restrictions:   For patients receiving Renal Replacement - No Protein Restr, No Conc K, No Conc Phos, Low Sodium  Consistent Carbohydrate No Snacks (CSTCHO)  Supplement Feeding Modality:  Oral  Nepro Cans or Servings Per Day:  1       Frequency:  Daily (07-24-23 @ 10:36) [Active]

## 2023-07-28 NOTE — PROGRESS NOTE ADULT - SUBJECTIVE AND OBJECTIVE BOX
[facility] DIVISION OF KIDNEY DISEASES AND HYPERTENSION -- FOLLOW UP NOTE  --------------------------------------------------------------------------------    HPI: 54 yo M with h/o ESRD on HD x6 years via LUE AVF (outpatient nephrologist: Dr. Bharath Romo), DM2 CAD, CHF, underwent CABG 2015, AICD (2016), and severe PVD (underwent R big toe and second toe amputation in 2021) initially presented for kidney transplant. Pt. underwent DDRT on 7/16. Pt. was transferred to SICU for IV vasopressor support, now off and transferred to medical floor. Post-transplant course significant for DGF. Pt. received first HD treatment on 7/17, last on 7/26. Pt. underwent fistulogram with angioplasty on 7/24.    24 hour events/subjective: Pt. was seen and evaluated at bedside this morning. Pt. reports improvement in BL UE and LE edema. LUE swelling resolved. He also endorses constipation, not improved with enema and laxatives. Denies any headaches, fevers/chills, chest pain, abdominal pain, and SOB.      PAST HISTORY  --------------------------------------------------------------------------------  No significant changes to PMH, PSH, FHx, SHx, unless otherwise noted    ALLERGIES & MEDICATIONS  --------------------------------------------------------------------------------  Allergies    Contrast. (Unknown)    Intolerances      Standing Inpatient Medications  aspirin  chewable 81 milliGRAM(s) Oral daily  atorvastatin 40 milliGRAM(s) Oral at bedtime  chlorhexidine 4% Liquid 1 Application(s) Topical <User Schedule>  famotidine    Tablet 20 milliGRAM(s) Oral daily  furosemide    Tablet 80 milliGRAM(s) Oral two times a day  insulin glargine Injectable (LANTUS) 23 Unit(s) SubCutaneous at bedtime  insulin lispro (ADMELOG) corrective regimen sliding scale   SubCutaneous Before meals and at bedtime  insulin lispro Injectable (ADMELOG) 6 Unit(s) SubCutaneous three times a day before meals  mycophenolate mofetil 1 Gram(s) Oral <User Schedule>  nystatin    Suspension 876811 Unit(s) Swish and Swallow four times a day  predniSONE   Tablet 5 milliGRAM(s) Oral daily  senna 2 Tablet(s) Oral at bedtime  sevelamer carbonate 800 milliGRAM(s) Oral three times a day with meals  tacrolimus ER Tablet (ENVARSUS XR) 8 milliGRAM(s) Oral <User Schedule>  trimethoprim   80 mG/sulfamethoxazole 400 mG 1 Tablet(s) Oral daily  valGANciclovir 450 milliGRAM(s) Oral <User Schedule>    PRN Inpatient Medications  dextrose Oral Gel 15 Gram(s) Oral once PRN  traMADol 25 milliGRAM(s) Oral every 6 hours PRN  traMADol 50 milliGRAM(s) Oral every 6 hours PRN      REVIEW OF SYSTEMS  --------------------------------------------------------------------------------  See HPI    VITALS/PHYSICAL EXAM  --------------------------------------------------------------------------------  T(C): 36.5 (07-28-23 @ 08:59), Max: 36.9 (07-28-23 @ 01:00)  HR: 81 (07-28-23 @ 08:59) (81 - 90)  BP: 137/73 (07-28-23 @ 08:59) (113/50 - 171/80)  RR: 18 (07-28-23 @ 08:59) (18 - 18)  SpO2: 100% (07-28-23 @ 08:59) (98% - 100%)  Wt(kg): --    07-27-23 @ 07:01  -  07-28-23 @ 07:00  --------------------------------------------------------  IN: 960 mL / OUT: 950 mL / NET: 10 mL    07-28-23 @ 07:01  -  07-28-23 @ 10:09  --------------------------------------------------------  IN: 120 mL / OUT: 100 mL / NET: 20 mL    Physical Exam:  Gen: NAD  HEENT: PERRL, supple neck, clear oropharynx  Pulm: CTA B/L  CV: RRR, S1S2;  Abd: +BS, soft, nontender/nondistended  : No suprapubic tenderness  Transplant: Surgical site is c/d/i  Extremities: +L UE and BL LE edema (improving)  Skin: Warm, without rashes  Vascular access: +LUE AVF with thrill and bruit appreciated, +swelling around AVF site (improving)    LABS/STUDIES  --------------------------------------------------------------------------------              6.7    9.89  >-----------<  146      [07-27-23 @ 06:35]              20.9     135  |  95  |  83  ----------------------------<  167      [07-27-23 @ 06:36]  3.8   |  24  |  5.45        Ca     8.7     [07-27-23 @ 06:36]      Mg     2.2     [07-27-23 @ 06:36]      Phos  5.0     [07-27-23 @ 06:36]    PTT: 26.4       [07-27-23 @ 06:36]    Creatinine Trend:  SCr 5.45 [07-27 @ 06:36]  SCr 8.03 [07-26 @ 07:03]  SCr 6.83 [07-25 @ 06:46]  SCr 6.30 [07-24 @ 05:06]  SCr 5.58 [07-23 @ 06:32]    Tacrolimus (), Serum: 6.1 ng/mL (07-27 @ 06:36)  Tacrolimus (), Serum: 5.4 ng/mL (07-26 @ 07:00)  Tacrolimus (), Serum: 8.2 ng/mL (07-25 @ 06:44)  Tacrolimus (), Serum: 9.6 ng/mL (07-24 @ 05:06)    HBsAb 5.7      [07-15-23 @ 21:22]  HBsAg Nonreact      [07-15-23 @ 21:21]  HBcAb Nonreact      [07-15-23 @ 21:22]  HCV 0.16, Nonreact      [07-15-23 @ 21:22]  HIV Nonreact      [07-15-23 @ 21:21]

## 2023-07-28 NOTE — PROGRESS NOTE ADULT - ASSESSMENT
52 yo M with h/o ESRD on HD x6 years via LUE AVF (outpatient nephrologist: Dr. Bharath Romo), DM2 CAD, CHF, underwent CABG 2015, AICD (2016), and severe PVD (underwent R big toe and second toe amputation in 2021) initially presented for kidney transplant. Pt. underwent DDRT on 7/16. Pt. was transferred to SICU for IV vasopressor support, now off and transferred to medical floor. Post-transplant course significant for DGF. Pt. received first HD treatment on 7/17, last on 7/26. Pt. underwent fistulogram with angioplasty on 7/24.

## 2023-07-28 NOTE — PROGRESS NOTE ADULT - PROBLEM SELECTOR PLAN 2
Current immunosuppression regimen is Envarsus 6 mg qd, Cellcept 1 gm BID, and prednisone taper. Tacrolimus trough result is pending from today. Recommend to continue with current regimen. Check tacrolimus trough 30 minutes prior to AM dose, goal: 8-10.
Current immunosuppression regimen is Cellcept 1 gm BID, and prednisone taper. Tacrolimus currently on hold due to elevated trough level. Tacrolimus trough improved to 9.1 today. Recommend to start Envarsus 6 mg qd. Continue with Cellcept, and prednisone taper. Check tacrolimus trough 30 minutes prior to AM dose, goal: 8-10.
Current immunosuppression regimen is Envarsus 8 mg qd, Cellcept 1 gm BID, and prednisone taper. Tacrolimus trough result from today is pending result. Recommend to continue with current regimen pending tacrolimus trough result. Check tacrolimus trough 30 minutes prior to AM dose, goal: 8-10.
Current immunosuppression regimen is Envarsus 7 mg qd, Cellcept 1 gm BID, and prednisone 5 mg qd. Tacrolimus trough was below goal at 6.1 yesterday. Envarsus dose was increased to 8 mg qd. Check tacrolimus trough 30 minutes prior to AM dose, goal: 8-10.
Current immunosuppression regimen is Envarsus 6 mg qd, Cellcept 1 gm BID, and prednisone taper. Tacrolimus trough is 9.2 today, at goal. Recommend to continue with current regimen. Check tacrolimus trough 30 minutes prior to AM dose, goal: 8-10.
Current immunosuppression regimen is Envarsus 7 mg qd, Cellcept 1 gm BID, and prednisone 5 mg qd. Tacrolimus trough result is pending from today. Recommend to continue with current regimen. Check tacrolimus trough 30 minutes prior to AM dose, goal: 8-10.
Outpatient BP goal < 130/80  Managed by primary team
Current immunosuppression regimen is Envarsus (by trough), Cellcept 1 gm BID, and prednisone taper. Tacrolimus trough is below goal at 5.4 today. Recommend to continue with current regimen. Check tacrolimus trough 30 minutes prior to AM dose, goal: 8-11.
Current immunosuppression regimen is Envarsus 7 mg qd, Cellcept 1 gm BID, and prednisone 5 mg qd. Tacrolimus trough result is pending from today. Recommend to continue with current regimen. Check tacrolimus trough 30 minutes prior to AM dose, goal: 8-10.
Current immunosuppression regimen is Envarsus 6 mg qd, Cellcept 1 gm BID, and prednisone taper. Tacrolimus trough result is pending from today. Recommend to continue with current regimen. Check tacrolimus trough 30 minutes prior to AM dose, goal: 8-10.
Current immunosuppression regimen is Envarsus 8 mg qd, Cellcept 1 gm BID, and prednisone taper. Tacrolimus trough is above goal 14.9 today. Recommend to hold Envarsus dose today, dose based on trough resulkt tomorrow. Check tacrolimus trough 30 minutes prior to AM dose, goal: 8-10.
Outpatient BP goal < 130/80  Managed by primary team

## 2023-07-28 NOTE — PROGRESS NOTE ADULT - ATTENDING SUPERVISION STATEMENT
Resident
Resident
Fellow
Resident
Fellow

## 2023-07-28 NOTE — PROGRESS NOTE ADULT - ASSESSMENT
52 yo M with hx of DM2 CAD, CHF, s/p CABG 2015, AICD (2016), on hemodialysis for approximately 6 years via L AVF (nephrologist Dr. Bharath Romo), He has hx of PVD, is s/p 4 stents in R leg (mid and distal right superficial femoral artery, right popliteal x2 on 1/14/22).; is sp RT big toe and second toe amputation 2021, on asa and plavix for severe PVD (per pt). Now here for possible DDRT. Pt reports does not make urine only few drops occasionally. Pt denies N/V/D, fevers/chills, CP, SOB. Pt reports last ate at 4pm today and had HD yesterday 7/14/23.  Nephrology consulted for ESRD s/p DDRT.      ESRD on HD [facility]  Nephrologist : Dr. Thomas / Dr. Brooke at [facility]   s/p DDRT 7/16   s/p HD 7/19- 7/22  s/p Left Radiocephalic fistulogram and subclavian vein angioplasty with Vascular 7/24  HD per transplant team --> s/p HD 7/26. Per TP, will likely need to continue HD as outpatient.   HD consent in chart.   Renal diet   Monitor     s/p DDRT @ [facility] 7/16/2023   Underwent L DDRT 7/13/2023 s/p Simulect and solumedrol for induction.   Repeat graft US with patent vessels  Management per Transplant  LUE swelling--> LUE swelling likely secondary to L subclavian thrombus noted on 7/18 duplex. S/p for left radiocephalic fistulogram and subclavian vein angioplasty (7/24).   Documented urine output is ~1145cc over the past 24 hours on IV Lasix   s/p HD 7/26   Continue immunosuppressants per transplant : Envarsus 7 mg qd, Cellcept 1 gm BID, and prednisone taper.   Initially received Simulect induction - Changed to Thymoglobulin given delayed graft function. Dose #2 7/22  Valcyte to every other day (MWF) Nystatin, Bactrim renal dose  Check tacrolimus trough 30 minutes prior to AM dose  Tacro level 7/27 is 5.3   Per Transplant, will continue HD as outpatient upon d/c.     Anemia  Maintain Hgb > 8  On LUX  Monitor     Hyperkalemia   in setting of ESRD and recent DDRT with sub-optimally functioning allograft.   Low K diet   Mgmt per Transplant  Monitor       HTN:   BP optimal     Monitor     Hyperphosphatemia  improving  Low PO4 diet  Phoslo switched to sevelamer 1600 mg tid -- continue at present   Monitor PO4 daily

## 2023-08-01 ENCOUNTER — APPOINTMENT (OUTPATIENT)
Dept: NEPHROLOGY | Facility: CLINIC | Age: 53
End: 2023-08-01
Payer: MEDICARE

## 2023-08-01 ENCOUNTER — LABORATORY RESULT (OUTPATIENT)
Age: 53
End: 2023-08-01

## 2023-08-01 VITALS
TEMPERATURE: 97.6 F | SYSTOLIC BLOOD PRESSURE: 119 MMHG | HEART RATE: 106 BPM | RESPIRATION RATE: 13 BRPM | BODY MASS INDEX: 31.74 KG/M2 | DIASTOLIC BLOOD PRESSURE: 59 MMHG | WEIGHT: 209.44 LBS | OXYGEN SATURATION: 100 % | HEIGHT: 68 IN

## 2023-08-01 PROCEDURE — 99214 OFFICE O/P EST MOD 30 MIN: CPT

## 2023-08-01 NOTE — PHYSICAL EXAM
[General Appearance - Alert] : alert [General Appearance - In No Acute Distress] : in no acute distress [Sclera] : the sclera and conjunctiva were normal [Outer Ear] : the ears and nose were normal in appearance [Involuntary Movements] : no involuntary movements were seen [___ (cm) Fistula] : [unfilled] (cm) fistula [] : no rash [FreeTextEntry1] : A, OX3 [Oriented To Time, Place, And Person] : oriented to person, place, and time [Impaired Insight] : insight and judgment were intact

## 2023-08-01 NOTE — ASSESSMENT
[FreeTextEntry1] : Renal Transplant recipient: Noted delayed allograft function, on dialysis at discharge, . Reviewed for urinary symptoms/fever/chills/pain/new symptoms. Tolerating medications. Lab data from hospital reviewed including allograft function, urinalysis, any viremia and trough level of medication as well as any imaging reports. Immunosuppression: reviewed; Noted induction regimen, maintenance regimen and reviewed target trough level. DM: Current regimen reviewed. Optimal target glucose levels reviewed for fasting and post prandial measurements. Will continue to monitor and adjust treatment; reviewed lifestyle modifications for glycemia control. Has flow sheets to maintain home charts of glucose , blood pressure, temperature, weight and urine output. Hypertension: controlled; Reviewed medications.  Reviewed target for blood pressure control. Hyperlipidemia: Reviewed medication regimen/lifestyle modification for maintaining target lipid levels. Cardiovascular risk reduction, primary/secondary prevention measures were discussed as appropriate.   Prophylaxis: Reviewed antimicrobial and GI prophylaxis as well as precautions to prevent infections. Discussed ambulation, using incentive spirometer, optimal glucose and blood pressure readings, adherence with medications and follow ups, follow up clinic visit schedule, avoiding dehydration, mosquito bites; prevention of DVT as well as food safety. Patient has met with transplant surgeon post transplant; post op wound care ureteral stent removal and follow up care were discussed. Advised to bring flow sheets and medication list at every visit. Copy of office visit and lab reports are being sent to primary physician and referring nephrologist.

## 2023-08-01 NOTE — HISTORY OF PRESENT ILLNESS
[FreeTextEntry1] : He received DDRT 7/16/23, Thymo induction. DGF on dialysis. Last dialyzed on 7/31 Monday at [facility] Urine output 100 ml/day On furosemide 80X2/d Envarsus 8/d Fulldose MMF/pred On vlcyte, aspirin, lipitor 40/d, pepcid, Renvela, Lantus 23/d Humalog 6 u  No pain/SOB Drain 50 ml/day Ankle swelling  Home glucose controlled Blood pressure pre dialysis 150, post dialysis 120.  Uses 2 pillows. No SOB   Had diarrhea after laxatives  No fever. No hematuria

## 2023-08-02 LAB
ALBUMIN SERPL ELPH-MCNC: 3.8 G/DL
ALP BLD-CCNC: 73 U/L
ALT SERPL-CCNC: 11 U/L
ANION GAP SERPL CALC-SCNC: 19 MMOL/L
AST SERPL-CCNC: 17 U/L
BILIRUB SERPL-MCNC: 0.5 MG/DL
BUN SERPL-MCNC: 46 MG/DL
CALCIUM SERPL-MCNC: 9.8 MG/DL
CHLORIDE SERPL-SCNC: 97 MMOL/L
CO2 SERPL-SCNC: 24 MMOL/L
CREAT SERPL-MCNC: 5.13 MG/DL
EGFR: 13 ML/MIN/1.73M2
GLUCOSE SERPL-MCNC: 140 MG/DL
MAGNESIUM SERPL-MCNC: 2.6 MG/DL
PHOSPHATE SERPL-MCNC: 4.2 MG/DL
POTASSIUM SERPL-SCNC: 4.4 MMOL/L
PROT SERPL-MCNC: 5.9 G/DL
SODIUM SERPL-SCNC: 140 MMOL/L
TACROLIMUS SERPL-MCNC: 9.5 NG/ML

## 2023-08-07 ENCOUNTER — INPATIENT (INPATIENT)
Facility: HOSPITAL | Age: 53
LOS: 12 days | Discharge: HOME | DRG: 699 | End: 2023-08-20
Attending: SURGERY | Admitting: SURGERY
Payer: MEDICARE

## 2023-08-07 ENCOUNTER — APPOINTMENT (OUTPATIENT)
Dept: TRANSPLANT | Facility: CLINIC | Age: 53
End: 2023-08-07

## 2023-08-07 VITALS
RESPIRATION RATE: 20 BRPM | OXYGEN SATURATION: 100 % | SYSTOLIC BLOOD PRESSURE: 110 MMHG | HEIGHT: 69 IN | HEART RATE: 99 BPM | DIASTOLIC BLOOD PRESSURE: 54 MMHG | TEMPERATURE: 98 F

## 2023-08-07 DIAGNOSIS — Z95.1 PRESENCE OF AORTOCORONARY BYPASS GRAFT: Chronic | ICD-10-CM

## 2023-08-07 DIAGNOSIS — Z94.0 KIDNEY TRANSPLANT STATUS: ICD-10-CM

## 2023-08-07 DIAGNOSIS — Z95.810 PRESENCE OF AUTOMATIC (IMPLANTABLE) CARDIAC DEFIBRILLATOR: Chronic | ICD-10-CM

## 2023-08-07 DIAGNOSIS — D64.9 ANEMIA, UNSPECIFIED: ICD-10-CM

## 2023-08-07 DIAGNOSIS — R10.9 UNSPECIFIED ABDOMINAL PAIN: ICD-10-CM

## 2023-08-07 DIAGNOSIS — Z89.422 ACQUIRED ABSENCE OF OTHER LEFT TOE(S): Chronic | ICD-10-CM

## 2023-08-07 DIAGNOSIS — E83.39 OTHER DISORDERS OF PHOSPHORUS METABOLISM: ICD-10-CM

## 2023-08-07 DIAGNOSIS — D84.9 IMMUNODEFICIENCY, UNSPECIFIED: ICD-10-CM

## 2023-08-07 LAB
ALBUMIN SERPL ELPH-MCNC: 3.4 G/DL — SIGNIFICANT CHANGE UP (ref 3.3–5)
ALBUMIN SERPL ELPH-MCNC: 3.6 G/DL — SIGNIFICANT CHANGE UP (ref 3.3–5)
ALP SERPL-CCNC: 67 U/L — SIGNIFICANT CHANGE UP (ref 40–120)
ALP SERPL-CCNC: 72 U/L — SIGNIFICANT CHANGE UP (ref 40–120)
ALT FLD-CCNC: 7 U/L — LOW (ref 10–45)
ALT FLD-CCNC: 9 U/L — LOW (ref 10–45)
ANION GAP SERPL CALC-SCNC: 18 MMOL/L — HIGH (ref 5–17)
ANION GAP SERPL CALC-SCNC: 21 MMOL/L — HIGH (ref 5–17)
ANISOCYTOSIS BLD QL: SIGNIFICANT CHANGE UP
APPEARANCE UR: ABNORMAL
APTT BLD: 30 SEC — SIGNIFICANT CHANGE UP (ref 24.5–35.6)
APTT BLD: SIGNIFICANT CHANGE UP SEC (ref 24.5–35.6)
AST SERPL-CCNC: 14 U/L — SIGNIFICANT CHANGE UP (ref 10–40)
AST SERPL-CCNC: 9 U/L — LOW (ref 10–40)
BACTERIA # UR AUTO: ABNORMAL
BASE EXCESS BLDV CALC-SCNC: 2.8 MMOL/L — SIGNIFICANT CHANGE UP (ref -2–3)
BASOPHILS # BLD AUTO: 0 K/UL — SIGNIFICANT CHANGE UP (ref 0–0.2)
BASOPHILS # BLD AUTO: 0.01 K/UL — SIGNIFICANT CHANGE UP (ref 0–0.2)
BASOPHILS NFR BLD AUTO: 0 % — SIGNIFICANT CHANGE UP (ref 0–2)
BASOPHILS NFR BLD AUTO: 0.3 % — SIGNIFICANT CHANGE UP (ref 0–2)
BILIRUB SERPL-MCNC: 0.4 MG/DL — SIGNIFICANT CHANGE UP (ref 0.2–1.2)
BILIRUB SERPL-MCNC: 0.4 MG/DL — SIGNIFICANT CHANGE UP (ref 0.2–1.2)
BILIRUB UR-MCNC: NEGATIVE — SIGNIFICANT CHANGE UP
BUN SERPL-MCNC: 47 MG/DL — HIGH (ref 7–23)
BUN SERPL-MCNC: 48 MG/DL — HIGH (ref 7–23)
BURR CELLS BLD QL SMEAR: PRESENT — SIGNIFICANT CHANGE UP
CA-I SERPL-SCNC: 1.36 MMOL/L — HIGH (ref 1.15–1.33)
CALCIUM SERPL-MCNC: 10 MG/DL — SIGNIFICANT CHANGE UP (ref 8.4–10.5)
CALCIUM SERPL-MCNC: 10 MG/DL — SIGNIFICANT CHANGE UP (ref 8.4–10.5)
CHLORIDE BLDV-SCNC: 100 MMOL/L — SIGNIFICANT CHANGE UP (ref 96–108)
CHLORIDE SERPL-SCNC: 96 MMOL/L — SIGNIFICANT CHANGE UP (ref 96–108)
CHLORIDE SERPL-SCNC: 97 MMOL/L — SIGNIFICANT CHANGE UP (ref 96–108)
CO2 BLDV-SCNC: 29 MMOL/L — HIGH (ref 22–26)
CO2 SERPL-SCNC: 21 MMOL/L — LOW (ref 22–31)
CO2 SERPL-SCNC: 23 MMOL/L — SIGNIFICANT CHANGE UP (ref 22–31)
COLOR SPEC: ABNORMAL
CREAT SERPL-MCNC: 4.54 MG/DL — HIGH (ref 0.5–1.3)
CREAT SERPL-MCNC: 5.01 MG/DL — HIGH (ref 0.5–1.3)
DACRYOCYTES BLD QL SMEAR: SLIGHT — SIGNIFICANT CHANGE UP
DIFF PNL FLD: ABNORMAL
EGFR: 13 ML/MIN/1.73M2 — LOW
EGFR: 15 ML/MIN/1.73M2 — LOW
ELLIPTOCYTES BLD QL SMEAR: SLIGHT — SIGNIFICANT CHANGE UP
EOSINOPHIL # BLD AUTO: 0 K/UL — SIGNIFICANT CHANGE UP (ref 0–0.5)
EOSINOPHIL # BLD AUTO: 0.04 K/UL — SIGNIFICANT CHANGE UP (ref 0–0.5)
EOSINOPHIL NFR BLD AUTO: 0 % — SIGNIFICANT CHANGE UP (ref 0–6)
EOSINOPHIL NFR BLD AUTO: 0.9 % — SIGNIFICANT CHANGE UP (ref 0–6)
EPI CELLS # UR: 0 /HPF — SIGNIFICANT CHANGE UP
GAS PNL BLDV: 137 MMOL/L — SIGNIFICANT CHANGE UP (ref 136–145)
GAS PNL BLDV: SIGNIFICANT CHANGE UP
GIANT PLATELETS BLD QL SMEAR: PRESENT — SIGNIFICANT CHANGE UP
GLUCOSE BLDC GLUCOMTR-MCNC: 131 MG/DL — HIGH (ref 70–99)
GLUCOSE BLDC GLUCOMTR-MCNC: 138 MG/DL — HIGH (ref 70–99)
GLUCOSE BLDC GLUCOMTR-MCNC: 146 MG/DL — HIGH (ref 70–99)
GLUCOSE BLDC GLUCOMTR-MCNC: 149 MG/DL — HIGH (ref 70–99)
GLUCOSE BLDC GLUCOMTR-MCNC: 165 MG/DL — HIGH (ref 70–99)
GLUCOSE BLDV-MCNC: 158 MG/DL — HIGH (ref 70–99)
GLUCOSE SERPL-MCNC: 150 MG/DL — HIGH (ref 70–99)
GLUCOSE SERPL-MCNC: 153 MG/DL — HIGH (ref 70–99)
GLUCOSE UR QL: NEGATIVE — SIGNIFICANT CHANGE UP
HCO3 BLDV-SCNC: 28 MMOL/L — SIGNIFICANT CHANGE UP (ref 22–29)
HCT VFR BLD CALC: 24.3 % — LOW (ref 39–50)
HCT VFR BLD CALC: 24.4 % — LOW (ref 39–50)
HCT VFR BLDA CALC: 23 % — LOW (ref 39–51)
HGB BLD CALC-MCNC: 7.8 G/DL — LOW (ref 12.6–17.4)
HGB BLD-MCNC: 7.3 G/DL — LOW (ref 13–17)
HGB BLD-MCNC: 7.6 G/DL — LOW (ref 13–17)
HYALINE CASTS # UR AUTO: 1 /LPF — SIGNIFICANT CHANGE UP (ref 0–2)
IMM GRANULOCYTES NFR BLD AUTO: 0.8 % — SIGNIFICANT CHANGE UP (ref 0–0.9)
INR BLD: 1.01 RATIO — SIGNIFICANT CHANGE UP (ref 0.85–1.18)
INR BLD: 1.07 RATIO — SIGNIFICANT CHANGE UP (ref 0.85–1.18)
KETONES UR-MCNC: NEGATIVE — SIGNIFICANT CHANGE UP
LACTATE BLDV-MCNC: 1.5 MMOL/L — SIGNIFICANT CHANGE UP (ref 0.5–2)
LEUKOCYTE ESTERASE UR-ACNC: ABNORMAL
LYMPHOCYTES # BLD AUTO: 0 % — LOW (ref 13–44)
LYMPHOCYTES # BLD AUTO: 0 K/UL — LOW (ref 1–3.3)
LYMPHOCYTES # BLD AUTO: 0.07 K/UL — LOW (ref 1–3.3)
LYMPHOCYTES # BLD AUTO: 1.8 % — LOW (ref 13–44)
MACROCYTES BLD QL: SLIGHT — SIGNIFICANT CHANGE UP
MAGNESIUM SERPL-MCNC: 2.3 MG/DL — SIGNIFICANT CHANGE UP (ref 1.6–2.6)
MANUAL SMEAR VERIFICATION: SIGNIFICANT CHANGE UP
MCHC RBC-ENTMCNC: 30 GM/DL — LOW (ref 32–36)
MCHC RBC-ENTMCNC: 30.9 PG — SIGNIFICANT CHANGE UP (ref 27–34)
MCHC RBC-ENTMCNC: 30.9 PG — SIGNIFICANT CHANGE UP (ref 27–34)
MCHC RBC-ENTMCNC: 31.1 GM/DL — LOW (ref 32–36)
MCV RBC AUTO: 103 FL — HIGH (ref 80–100)
MCV RBC AUTO: 99.2 FL — SIGNIFICANT CHANGE UP (ref 80–100)
MONOCYTES # BLD AUTO: 0.19 K/UL — SIGNIFICANT CHANGE UP (ref 0–0.9)
MONOCYTES # BLD AUTO: 0.31 K/UL — SIGNIFICANT CHANGE UP (ref 0–0.9)
MONOCYTES NFR BLD AUTO: 4.3 % — SIGNIFICANT CHANGE UP (ref 2–14)
MONOCYTES NFR BLD AUTO: 7.8 % — SIGNIFICANT CHANGE UP (ref 2–14)
NEUTROPHILS # BLD AUTO: 3.56 K/UL — SIGNIFICANT CHANGE UP (ref 1.8–7.4)
NEUTROPHILS # BLD AUTO: 4.25 K/UL — SIGNIFICANT CHANGE UP (ref 1.8–7.4)
NEUTROPHILS NFR BLD AUTO: 89.3 % — HIGH (ref 43–77)
NEUTROPHILS NFR BLD AUTO: 94.8 % — HIGH (ref 43–77)
NITRITE UR-MCNC: NEGATIVE — SIGNIFICANT CHANGE UP
NRBC # BLD: 0 /100 WBCS — SIGNIFICANT CHANGE UP (ref 0–0)
PCO2 BLDV: 45 MMHG — SIGNIFICANT CHANGE UP (ref 42–55)
PH BLDV: 7.4 — SIGNIFICANT CHANGE UP (ref 7.32–7.43)
PH UR: 6 — SIGNIFICANT CHANGE UP (ref 5–8)
PHOSPHATE SERPL-MCNC: 3.7 MG/DL — SIGNIFICANT CHANGE UP (ref 2.5–4.5)
PLAT MORPH BLD: NORMAL — SIGNIFICANT CHANGE UP
PLATELET # BLD AUTO: 162 K/UL — SIGNIFICANT CHANGE UP (ref 150–400)
PLATELET # BLD AUTO: 176 K/UL — SIGNIFICANT CHANGE UP (ref 150–400)
PO2 BLDV: 36 MMHG — SIGNIFICANT CHANGE UP (ref 25–45)
POIKILOCYTOSIS BLD QL AUTO: SLIGHT — SIGNIFICANT CHANGE UP
POTASSIUM BLDV-SCNC: 3.7 MMOL/L — SIGNIFICANT CHANGE UP (ref 3.5–5.1)
POTASSIUM SERPL-MCNC: 3.7 MMOL/L — SIGNIFICANT CHANGE UP (ref 3.5–5.3)
POTASSIUM SERPL-MCNC: 3.8 MMOL/L — SIGNIFICANT CHANGE UP (ref 3.5–5.3)
POTASSIUM SERPL-SCNC: 3.7 MMOL/L — SIGNIFICANT CHANGE UP (ref 3.5–5.3)
POTASSIUM SERPL-SCNC: 3.8 MMOL/L — SIGNIFICANT CHANGE UP (ref 3.5–5.3)
PROT SERPL-MCNC: 5.8 G/DL — LOW (ref 6–8.3)
PROT SERPL-MCNC: 6.2 G/DL — SIGNIFICANT CHANGE UP (ref 6–8.3)
PROT UR-MCNC: ABNORMAL
PROTHROM AB SERPL-ACNC: 10.6 SEC — SIGNIFICANT CHANGE UP (ref 9.5–13)
PROTHROM AB SERPL-ACNC: 11.2 SEC — SIGNIFICANT CHANGE UP (ref 9.5–13)
RBC # BLD: 2.36 M/UL — LOW (ref 4.2–5.8)
RBC # BLD: 2.46 M/UL — LOW (ref 4.2–5.8)
RBC # FLD: 18.4 % — HIGH (ref 10.3–14.5)
RBC # FLD: 18.7 % — HIGH (ref 10.3–14.5)
RBC BLD AUTO: ABNORMAL
RBC CASTS # UR COMP ASSIST: 552 /HPF — HIGH (ref 0–4)
SAO2 % BLDV: 62.4 % — LOW (ref 67–88)
SODIUM SERPL-SCNC: 138 MMOL/L — SIGNIFICANT CHANGE UP (ref 135–145)
SODIUM SERPL-SCNC: 138 MMOL/L — SIGNIFICANT CHANGE UP (ref 135–145)
SP GR SPEC: 1.01 — SIGNIFICANT CHANGE UP (ref 1.01–1.02)
TACROLIMUS SERPL-MCNC: 12.3 NG/ML — SIGNIFICANT CHANGE UP
UROBILINOGEN FLD QL: NEGATIVE — SIGNIFICANT CHANGE UP
WBC # BLD: 3.98 K/UL — SIGNIFICANT CHANGE UP (ref 3.8–10.5)
WBC # BLD: 4.48 K/UL — SIGNIFICANT CHANGE UP (ref 3.8–10.5)
WBC # FLD AUTO: 3.98 K/UL — SIGNIFICANT CHANGE UP (ref 3.8–10.5)
WBC # FLD AUTO: 4.48 K/UL — SIGNIFICANT CHANGE UP (ref 3.8–10.5)
WBC UR QL: 63 /HPF — HIGH (ref 0–5)

## 2023-08-07 PROCEDURE — 71045 X-RAY EXAM CHEST 1 VIEW: CPT | Mod: 26

## 2023-08-07 PROCEDURE — 76776 US EXAM K TRANSPL W/DOPPLER: CPT | Mod: 26,LT

## 2023-08-07 PROCEDURE — 90935 HEMODIALYSIS ONE EVALUATION: CPT | Mod: GC

## 2023-08-07 PROCEDURE — 74176 CT ABD & PELVIS W/O CONTRAST: CPT | Mod: 26,MA

## 2023-08-07 PROCEDURE — 99254 IP/OBS CNSLTJ NEW/EST MOD 60: CPT | Mod: GC,25

## 2023-08-07 PROCEDURE — 99285 EMERGENCY DEPT VISIT HI MDM: CPT

## 2023-08-07 PROCEDURE — 99233 SBSQ HOSP IP/OBS HIGH 50: CPT | Mod: GC,25

## 2023-08-07 PROCEDURE — 99222 1ST HOSP IP/OBS MODERATE 55: CPT | Mod: 24

## 2023-08-07 RX ORDER — CEFTRIAXONE 500 MG/1
2000 INJECTION, POWDER, FOR SOLUTION INTRAMUSCULAR; INTRAVENOUS ONCE
Refills: 0 | Status: COMPLETED | OUTPATIENT
Start: 2023-08-07 | End: 2023-08-07

## 2023-08-07 RX ORDER — SENNA PLUS 8.6 MG/1
2 TABLET ORAL AT BEDTIME
Refills: 0 | Status: DISCONTINUED | OUTPATIENT
Start: 2023-08-07 | End: 2023-08-17

## 2023-08-07 RX ORDER — ATORVASTATIN CALCIUM 80 MG/1
40 TABLET, FILM COATED ORAL AT BEDTIME
Refills: 0 | Status: DISCONTINUED | OUTPATIENT
Start: 2023-08-07 | End: 2023-08-20

## 2023-08-07 RX ORDER — INSULIN GLARGINE 100 [IU]/ML
23 INJECTION, SOLUTION SUBCUTANEOUS AT BEDTIME
Refills: 0 | Status: DISCONTINUED | OUTPATIENT
Start: 2023-08-07 | End: 2023-08-08

## 2023-08-07 RX ORDER — MYCOPHENOLATE MOFETIL 250 MG/1
1000 CAPSULE ORAL
Refills: 0 | Status: DISCONTINUED | OUTPATIENT
Start: 2023-08-07 | End: 2023-08-20

## 2023-08-07 RX ORDER — TACROLIMUS 5 MG/1
8 CAPSULE ORAL
Refills: 0 | Status: DISCONTINUED | OUTPATIENT
Start: 2023-08-07 | End: 2023-08-07

## 2023-08-07 RX ORDER — FAMOTIDINE 10 MG/ML
20 INJECTION INTRAVENOUS DAILY
Refills: 0 | Status: DISCONTINUED | OUTPATIENT
Start: 2023-08-07 | End: 2023-08-20

## 2023-08-07 RX ORDER — VALGANCICLOVIR 450 MG/1
450 TABLET, FILM COATED ORAL
Refills: 0 | Status: DISCONTINUED | OUTPATIENT
Start: 2023-08-07 | End: 2023-08-20

## 2023-08-07 RX ORDER — CEFTRIAXONE 500 MG/1
1000 INJECTION, POWDER, FOR SOLUTION INTRAMUSCULAR; INTRAVENOUS EVERY 24 HOURS
Refills: 0 | Status: COMPLETED | OUTPATIENT
Start: 2023-08-08 | End: 2023-08-10

## 2023-08-07 RX ORDER — FUROSEMIDE 40 MG
80 TABLET ORAL
Refills: 0 | Status: DISCONTINUED | OUTPATIENT
Start: 2023-08-07 | End: 2023-08-13

## 2023-08-07 RX ORDER — SEVELAMER CARBONATE 2400 MG/1
800 POWDER, FOR SUSPENSION ORAL
Refills: 0 | Status: DISCONTINUED | OUTPATIENT
Start: 2023-08-07 | End: 2023-08-13

## 2023-08-07 RX ORDER — INSULIN LISPRO 100/ML
6 VIAL (ML) SUBCUTANEOUS
Refills: 0 | Status: DISCONTINUED | OUTPATIENT
Start: 2023-08-07 | End: 2023-08-09

## 2023-08-07 RX ORDER — NYSTATIN 500MM UNIT
500000 POWDER (EA) MISCELLANEOUS
Refills: 0 | Status: DISCONTINUED | OUTPATIENT
Start: 2023-08-07 | End: 2023-08-20

## 2023-08-07 RX ORDER — ASPIRIN/CALCIUM CARB/MAGNESIUM 324 MG
81 TABLET ORAL DAILY
Refills: 0 | Status: DISCONTINUED | OUTPATIENT
Start: 2023-08-07 | End: 2023-08-07

## 2023-08-07 RX ORDER — POLYETHYLENE GLYCOL 3350 17 G/17G
17 POWDER, FOR SOLUTION ORAL
Refills: 0 | Status: DISCONTINUED | OUTPATIENT
Start: 2023-08-07 | End: 2023-08-15

## 2023-08-07 RX ADMIN — Medication 500000 UNIT(S): at 18:43

## 2023-08-07 RX ADMIN — SEVELAMER CARBONATE 800 MILLIGRAM(S): 2400 POWDER, FOR SUSPENSION ORAL at 21:56

## 2023-08-07 RX ADMIN — MYCOPHENOLATE MOFETIL 1000 MILLIGRAM(S): 250 CAPSULE ORAL at 09:39

## 2023-08-07 RX ADMIN — FAMOTIDINE 20 MILLIGRAM(S): 10 INJECTION INTRAVENOUS at 11:31

## 2023-08-07 RX ADMIN — SEVELAMER CARBONATE 800 MILLIGRAM(S): 2400 POWDER, FOR SUSPENSION ORAL at 09:35

## 2023-08-07 RX ADMIN — Medication 500000 UNIT(S): at 11:32

## 2023-08-07 RX ADMIN — VALGANCICLOVIR 450 MILLIGRAM(S): 450 TABLET, FILM COATED ORAL at 09:35

## 2023-08-07 RX ADMIN — Medication 5 MILLIGRAM(S): at 21:28

## 2023-08-07 RX ADMIN — MYCOPHENOLATE MOFETIL 1000 MILLIGRAM(S): 250 CAPSULE ORAL at 21:23

## 2023-08-07 RX ADMIN — Medication 500000 UNIT(S): at 23:06

## 2023-08-07 RX ADMIN — SEVELAMER CARBONATE 800 MILLIGRAM(S): 2400 POWDER, FOR SUSPENSION ORAL at 18:31

## 2023-08-07 RX ADMIN — Medication 6 UNIT(S): at 12:24

## 2023-08-07 RX ADMIN — INSULIN GLARGINE 23 UNIT(S): 100 INJECTION, SOLUTION SUBCUTANEOUS at 21:53

## 2023-08-07 RX ADMIN — ATORVASTATIN CALCIUM 40 MILLIGRAM(S): 80 TABLET, FILM COATED ORAL at 21:24

## 2023-08-07 RX ADMIN — Medication 6 UNIT(S): at 09:45

## 2023-08-07 RX ADMIN — Medication 6 UNIT(S): at 18:32

## 2023-08-07 RX ADMIN — TACROLIMUS 8 MILLIGRAM(S): 5 CAPSULE ORAL at 09:36

## 2023-08-07 RX ADMIN — Medication 80 MILLIGRAM(S): at 18:31

## 2023-08-07 RX ADMIN — SENNA PLUS 2 TABLET(S): 8.6 TABLET ORAL at 21:24

## 2023-08-07 RX ADMIN — CEFTRIAXONE 100 MILLIGRAM(S): 500 INJECTION, POWDER, FOR SOLUTION INTRAMUSCULAR; INTRAVENOUS at 09:34

## 2023-08-07 RX ADMIN — Medication 1 TABLET(S): at 18:32

## 2023-08-07 RX ADMIN — Medication 5 MILLIGRAM(S): at 21:24

## 2023-08-07 NOTE — ED ADULT NURSE NOTE - OBJECTIVE STATEMENT
53-year-old male history of end-stage renal disease is status post transplant by Dr. Charlie Corado on 7/16/23, hemodialysis last completed 8/4, diabetes, CHF status post CABG 2015, AICD, presents with 4 days of constipation and 2 days of decreased urine output. States has an urge to urinate but develops lower abd and rectal pain when bearing down. States during prior admission had attempted multiple enemas, MiraLAX, with no relief.  Patient also endorsing pain over kidney insertion site and brief episode of palpitations.  Patient passing gas.  Denies fevers, shortness of breath, nausea, vomiting, dysuria. AOx4 and speaking coherently. Breathing is unlabored, spontaneous, and symmetrical. NSR on cardiac monitor with PVCs. Healed scars noted to sternum. <2s capillary refill. Full ROM of all extremities. R great toe amputation. Abdomen is rounded and distended.

## 2023-08-07 NOTE — CONSULT NOTE ADULT - PROBLEM SELECTOR RECOMMENDATION 3
Pt. with anemia in the setting of ESRD/DGF. Hgb low at 7.6 today. Check iron studies. Will order epo with HD sessions once weight is updated. Monitor hgb.

## 2023-08-07 NOTE — H&P ADULT - NS ATTEND AMEND GEN_ALL_CORE FT
s/p DDRT 7/16/23 with DGF, admitted with abdominal pain and UTI  f/u cultures  Abx  bowel regimen for constipation  Cont current immunosuppression with Envarsus 8mg daily, Prednisone 5mg daily, Cellcept 1gm bid.   Will check tacrolimus level and adjust dose accordingly for level ~8-10  Transplant Prophylaxis with nystatin, bactrim, valcyte  GI prophylaxis due to steroids  IR for biopsy of transplant kidney due to DGF

## 2023-08-07 NOTE — CONSULT NOTE ADULT - ASSESSMENT
52 yo M with hx of DM2 CAD, CHF, s/p CABG 2015, AICD (2016), on hemodialysis for approximately 6 years via L AVF (nephrologist Dr. Bharath Romo), He has hx of PVD, is s/p 4 stents in R leg (mid and distal right superficial femoral artery, right popliteal x2 on 1/14/22).; is sp RT big toe and second toe amputation 2021, on asa and plavix for severe PVD (per pt). Now here for possible DDRT. Pt reports does not make urine only few drops occasionally. Pt denies N/V/D, fevers/chills, CP, SOB. Pt reports last ate at 4pm today and had HD yesterday 7/14/23.  Nephrology consulted for ESRD s/p DDRT.      s/p DDRT @ [facility] 7/16/2023   Initially received Simulect induction -- Changed to Thymoglobulin given delayed graft function.  Pt discharged on previous admission on HD.   Reports last HD was Friday 8/4 with ~ 1.75 L UF   Via L AVF   s/p Left Radiocephalic fistulogram and subclavian vein angioplasty with Vascular 7/24/2023  HD per Transplant team, Consent   s/p CT A& P No contrast 8/7/23 --> partially imaged L pleural effusion, unchanged. Partially  imaged LLL opacity again seen.  small amount of dependent air within urinary bladder, small renal cysts, left transplant kidney with mil hydro. Stent extends from kidney to bladder.   s/p Doppler Renal US Transplant kidney 8/7/23 --> patent renal vasculature. Elevated renal artery peak systolic velocities. Concern for possible renal artery stenosis at anastomosis. Mild elevation  of intrarenal resistive indices , grossly unchanged from prior exam.   Continue Immunosuppression per transplant   FK level 12.3 8/7   Check tacrolimus trough 30 minutes prior to AM dose  IR consult noted -- PT planned for transplant renal Biopsy on Wednesday 8/9/23   Renal Diet   Monitor     Anemia  Mgmt per Transplant   LUX with HD   Maintain Hgb > 8  Monitor     HTN:   BP optimal     Monitor    52 yo M with hx of DM2 CAD, CHF, s/p CABG 2015, AICD (2016), on hemodialysis for approximately 6 years via L AVF (nephrologist Dr. Bharath Romo), He has hx of PVD, is s/p 4 stents in R leg (mid and distal right superficial femoral artery, right popliteal x2 on 1/14/22).; is sp RT big toe and second toe amputation 2021, on asa and plavix for severe PVD (per pt). Now here for possible DDRT. Pt reports does not make urine only few drops occasionally. Pt denies N/V/D, fevers/chills, CP, SOB. Pt reports last ate at 4pm today and had HD yesterday 7/14/23.  Nephrology consulted for ESRD s/p DDRT.      s/p DDRT @ [facility] 7/16/2023   Initially received Simulect induction -- Changed to Thymoglobulin given delayed graft function.  Pt discharged on previous admission on HD.   Post transport course significant for DGF  Reports last HD was Friday 8/4 with ~ 1.75 L UF   [facility]   Via L AVF --> s/p Left Radiocephalic fistulogram and subclavian vein angioplasty with Vascular 7/24/2023  HD per transplant, Plan for HD today  Consent obtained from patient and placed in chart.  s/p CT A& P No contrast 8/7/23 --> partially imaged L pleural effusion, unchanged. Partially  imaged LLL opacity again seen.  small amount of dependent air within urinary bladder, small renal cysts, left transplant kidney with mil hydro. Stent extends from kidney to bladder.   s/p Doppler Renal US Transplant kidney 8/7/23 --> patent renal vasculature. Elevated renal artery peak systolic velocities. Concern for possible renal artery stenosis at anastomosis. Mild elevation  of intrarenal resistive indices , grossly unchanged from prior exam.   Continue Immunosuppression per transplant   Envarsus 8 mg qd, Cellcept 1 gm BID, and prednisone 5 mg qd.   FK level 12.3 8/7   Check tacrolimus trough 30 minutes prior to AM dose  IR consult noted -- PT planned for transplant renal Biopsy on Wednesday 8/9/23   Monitor labs and urine output. Avoid nephrotoxins  Dose medications as per eGFR.  Renal Diet   Monitor       Anemia  Mgmt per Transplant - planning for LUX with HD   Maintain Hgb > 8  Monitor     HTN:   BP optimal     Monitor    54 yo M with hx of DM2 CAD, CHF, s/p CABG 2015, AICD (2016), on hemodialysis for approximately 6 years via L AVF (nephrologist Dr. Bharath Romo), He has hx of PVD, is s/p 4 stents in R leg (mid and distal right superficial femoral artery, right popliteal x2 on 1/14/22).; is sp RT big toe and second toe amputation 2021, on asa and plavix for severe PVD (per pt). Now here for possible DDRT. Pt reports does not make urine only few drops occasionally. Pt denies N/V/D, fevers/chills, CP, SOB. Pt reports last ate at 4pm today and had HD yesterday 7/14/23.  Nephrology consulted for ESRD s/p DDRT.      s/p DDRT @ [facility] 7/16/2023   Initially received Simulect induction -- Changed to Thymoglobulin given delayed graft function.  Pt discharged on previous admission on HD.   Post transport course significant for DGF  Reports last HD was Friday 8/4 with ~ 1.75 L UF   [facility]   Via L AVF --> s/p Left Radiocephalic fistulogram and subclavian vein angioplasty with Vascular 7/24/2023  HD per transplant, Plan for HD today  Consent obtained from patient and placed in chart.  s/p CT A& P No contrast 8/7/23 --> partially imaged L pleural effusion, unchanged. Partially  imaged LLL opacity again seen.  small amount of dependent air within urinary bladder, small renal cysts, left transplant kidney with mil hydro. Stent extends from kidney to bladder.   s/p Doppler Renal US Transplant kidney 8/7/23 --> patent renal vasculature. Elevated renal artery peak systolic velocities. Concern for possible renal artery stenosis at anastomosis. Mild elevation  of intrarenal resistive indices , grossly unchanged from prior exam.   Continue Immunosuppression per transplant   Envarsus 8 mg qd, Cellcept 1 gm BID, and prednisone 5 mg qd.   FK level 12.3 8/7   Check tacrolimus trough 30 minutes prior to AM dose  IR consult noted -- PT planned for transplant renal Biopsy on Wednesday 8/9/23   Monitor labs and urine output. Avoid nephrotoxins  Dose medications as per eGFR.  Renal Diet   Monitor     Anemia  Mgmt per Transplant - planning for LUX with HD   Maintain Hgb > 8  Monitor     HTN:   BP optimal     Monitor     Proteinuria/Hemturia  likely in setting of + LE, bacteria  Mgmt per transplant

## 2023-08-07 NOTE — CONSULT NOTE ADULT - PROBLEM SELECTOR RECOMMENDATION 2
Current immunosuppression regimen is Envarsus 8 mg qd, Cellcept 1 gm BID, and prednisone 5 mg qd. Tacro trough noted 12.3 today. Check tacrolimus trough 30 minutes prior to AM dose, goal: 8-10.

## 2023-08-07 NOTE — ED PROVIDER NOTE - PHYSICAL EXAMINATION
GEN: NAD. A&Ox3. obese habitus  HEENT: normocephalic, atraumatic, EOMI, PERRL, no scleral icterus, no conjuntival pallor, moist MM  CARDIAC: RRR, S1, S2, no murmur. Radial pulses pulses present and symmetric b/l  PULM: CTA B/L no wheeze, rhonchi, rales.   ABD: LLQ ttp no rebound or guarding, drain in place   MSK: Moving all extremities, no edema  NEURO: no focal neurological deficits, CN 2-12 grossly intact  SKIN: ecchymosis over upper and lower extremities

## 2023-08-07 NOTE — ED PROVIDER NOTE - OBJECTIVE STATEMENT
53-year-old male history of end-stage renal disease is status post transplant by Dr. Charlie Corado on 7/16/23, hemodialysis Monday Wednesday Friday with [facility], diabetes, CHF status post CABG 2015, AICD, presents with 4 days of constipation and 2 days of decreased urine output. States has an urge to urinate but develops lower abd and rectal pain when bearing down. States during prior admission had attempted multiple enemas, MiraLAX, with no relief.  Patient also endorsing pain over kidney insertion site and brief episode of palpitations.  Patient passing gas.  Denies fevers, shortness of breath, nausea, vomiting, dysuria.

## 2023-08-07 NOTE — H&P ADULT - NSHPLABSRESULTS_GEN_ALL_CORE
7.6    3.98  )-----------( 162      ( 07 Aug 2023 06:58 )             24.4     08-07    138  |  97  |  48<H>  ----------------------------<  150<H>  3.8   |  23  |  5.01<H>    Ca    10.0      07 Aug 2023 06:58  Phos  3.7     08-07  Mg     2.3     08-07    TPro  5.8<L>  /  Alb  3.4  /  TBili  0.4  /  DBili  x   /  AST  9<L>  /  ALT  9<L>  /  AlkPhos  67  08-07    CAPILLARY BLOOD GLUCOSE        PT/INR - ( 07 Aug 2023 06:58 )   PT: 11.2 sec;   INR: 1.07 ratio         PTT - ( 07 Aug 2023 06:58 )  PTT:30.0 sec  COVID-19 PCR: [facility] (15 Jul 2023 21:22)

## 2023-08-07 NOTE — CONSULT NOTE ADULT - PROBLEM SELECTOR RECOMMENDATION 4
Phos is at goal for now. C/w home Sevelamer 800 mg tid. Recommend to continue with phosphorus binder. Low phosphorus diet. Monitor serum phosphorus, goal: 3.5-5.5    If you have any questions, please feel free to contact me  Shar Schaefer  Nephrology Fellow  [phone]; Prefer [employer] TEAMS  (After 5pm or on weekends please page the on-call fellow).

## 2023-08-07 NOTE — ED PROVIDER NOTE - CARE PLAN
Principal Discharge DX:	Undifferentiated abdominal pain  Secondary Diagnosis:	Constipation   1 Principal Discharge DX:	Undifferentiated abdominal pain  Secondary Diagnosis:	Constipation  Secondary Diagnosis:	Perinephric hematoma  Secondary Diagnosis:	Acute cystitis  Secondary Diagnosis:	Pleural effusion, left  Secondary Diagnosis:	Anemia

## 2023-08-07 NOTE — ED PROVIDER NOTE - NSTIMEPROVIDERCAREINITIATE_GEN_ER
MEDICAL ELIGIBILITY FORM    Name: William Chin YOB: 2011     William is Medically eligible for all sports without restriction    Recommendations: N/A    I have examined the student named on this form and completed the preparticipation physical evaluation. The athlete does not have apparent clinical contraindications to practice and can participate in the sport(s) as outlined on this form. A copy of the physical examination findings are on record in my office and can be made available to the school at the request of the parents. If conditions arise after the athlete has been cleared for participation, the physician may rescind the medical eligibility until the problem is resolved and the potential consequences are completely explained to the athlete (and parents or guardians).    Name of health care professional (print or type): Yanira Lerma MD Date: 6/15/2023     Address: [address]  [facility] [address]  Dept: [phone]     Signature of health care professional: _______________________________________      SHARED EMERGENCY INFORMATION    Allergies   Allergen Reactions   • Amoxil RASH   • Milk   (Food Or Med) ANAPHYLAXIS     Suspected allergy-sinus congestion   • Kittrell   (Food Or Med) Other (See Comments)     Allergy test   • Blue Dyes   (Food Or Med) Other (See Comments)   • Grapefruit   (Food Or Med) Other (See Comments)     Allergy test     • Red Dye   (Food Or Med) Other (See Comments)     All dyes Allergy test   • Seasonal Other (See Comments)       Current Outpatient Medications   Medication Instructions   • albuterol 108 (90 Base) MCG/ACT inhaler 1-2 puffs, Inhalation   • lisdexamfetamine (VYVANSE) 40 mg, Oral, EVERY MORNING   • Loratadine (CLARITIN PO) Take  by mouth.    • Spacer/Aero-Holding Chambers Device FOR USE WITH INHALER.   • triamcinolone (ARISTOCORT) 0.1 % cream BRITTANY AA 2 TO 3 TIMES D FOR 3 TO 5 D TAPER AND  DISCONTINUE WHEN CLEAR       Other information:_____________________________________________________________________  _____________________________________________________________________________________  _____________________________________________________________________________________    Emergency contacts:___________________________________________________________________  _____________________________________________________________________________________  _____________________________________________________________________________________     © 2019 [employer], American Academy of Pediatrics, American College of Sport Medicine, American Medical Society for Sports Medicine, American Orthopaedics Society for Sport Medicine, and American Osteopathic Academy of Sports Medicine.  Permission is granted to reprint for noncommercial, educational purposes with acknowledgement.        PHYSICAL EXAMINATION FORM     Name: William Chin YOB: 2011   PHYSICIAN REMINDERS  1. Consider additional questions on more-sensitive issues.  Do you feel stressed out or under a lot of pressure?  Do you feel sad, hopeless, depressed or anxious?  Do you feel safe at your home or residence?  During the past 30 days, did you use chewing tobacco, snuff or dip?  Do you drink alcohol or user any other drugs?  Have you even taken anabolic steroids or used any other performance-enhancing supplement?  Have you even take any supplements to help you gain or lose weight or improve your performance?  Do you wear a seat belt, use a helmet, and use condoms?  2.  Consider reviewing questions on cardiovascular symptoms (Q4-Q13 of History Form).    EXAMINATION     Vitals: /70   Pulse 71   Temp 97.5 °F (36.4 °C) (Temporal)   Resp 20   Ht 5' 0.5\" (1.537 m)   Wt (!) 65.3 kg (144 lb)   BMI 27.66 kg/m²   BSA 1.63 m²    Vision: R 20/               L 20/                      Corrected  Y         N      MEDICAL NORMAL ABNORMAL FINDINGS   Appearance  Marfan stigmata (kyphoscoliosis, high-arched palate, pectus excavatum, arachnodactyly, arm span > height, hyperlaxity, myopia, MVP, aortic insufficiency) Yes    Eyes, ears, nose, throat  Pupils equal  Hearing Yes    Lymph nodes Yes    Heart*  Murmurs (auscultation standing, auscultation supine, +/- Valsalva maneuver) Yes    Lungs Yes    Abdomen Yes    Skin  Herpes simplex virus (HSV), lesions suggestive of methicillin-resistant Staphylococcus aureus MRSA, or tinea corporis Yes    MUSCULOSKELETAL NORMAL ABNORMAL FINDINGS   Neck Yes    Back Yes    Shoulder and arm Yes    Elbow and forearm Yes    Wrist, hand, and fingers Yes    Hip and thigh Yes    Knee Yes    Leg and ankle Yes    Foot and toes Yes    Functional  Double-leg squat test, single-leg squat test, and box drop or step drop test Yes    * Consider electrocardiography ECG, echocardiogram, referral to cardiology for abnormal cardiac history or examination finding, or a combination of those.  Name of health care professional (print or type): Yanira Lerma MD  Date: 6/15/2023  Address: [address] [address]  Dept: [phone]   Signature of health care professional: _______________________________________    © 2019 American Academy of Family Physicians, American Academy of Pediatrics, American College of Sport Medicine, American Medical Society for Sports Medicine, American Orthopaedics Society for Sport Medicine, and American Osteopathic Academy of Sports Medicine.  Permission is granted to reprint for noncommercial, educational purposes with acknowledgement.         HISTORY FORM  Note: Complete and sign this form (with your parents if younger than 18) before your appointment.  Name: William Chin YOB: 2011     Date of examination: 6/15/2023  Sport(s):_________________________________________   Sex assigned at birth: (F, M,  or other): male How do you identify your gender?(F, M, or other):_________     List past and current medical conditions:____________________________________________________________________  _______________________________________________________________________________________________________________    Have you ever had surgery? If yes, list all past surgical procedures: ______________________________________________  _______________________________________________________________________________________________________________    Medicines and supplements: List all current prescriptions, over-the-counter medicines, and supplements (herbal and nutritional):   ______________________________________________________________________________________________________________  ______________________________________________________________________________________________________________    Do you have any allergies? If yes, please list all your allergies (ie, medicines, pollens, food, stinging insects).   ______________________________________________________________________________________________________________  ______________________________________________________________________________________________________________       Patient Health Questionnaire Version 4 (PHQ-4)  Over the last 2 weeks, how often have you been bothered by any of the following problems? ([place] response.)   Not at all Several days Over half the days Nearly every day   Feeling nervous, anxious, or on edge 0 1 2 3   Not being able to stop or control worrying 0 1 2 3   Little interest or pleasure in doing things 0 1 2 3   Feeling down, depressed, or hopeless 0 1 2 3   (A sum of =3 is considered positive on either subscale [questions 1 and 2, or questions 3 and 4] for screening purposes.)     GENERAL QUESTIONS  (Explain “Yes” answers at the end of this form.  [place] questions if you don’t know the answer.)     Yes     No   1. Do you have any  concerns that you would like to discuss with your provider?       2. Has a provider ever denied or restricted your participation in sports for any reason?       3. Do you have any ongoing medical issues or recent illness?       HEART HEALTH QUESTIONS ABOUT YOU Yes No   4. Have you ever passed out or nearly passed out during or after exercise?       5. Have you ever had discomfort, pain, tightness, or pressure in your chest during exercise?       6. Does your heart ever race, flutter in your chest, or skip beats (irregular beats) during exercise?       7. Has a doctor ever told you that you have any heart problems?       8. Has a doctor ever requested a test for your heart? For example, electrocardiography (ECG) or echocardiography.      HEART HEALTH QUESTIONS ABOUT YOU (CONTINUED ) Yes No   9. Do you get light-headed or feel shorter of breath than your friends during exercise?       10. Have you ever had a seizure?       HEART HEALTH QUESTIONS ABOUT YOUR FAMILY Yes No   11. Has any family member or relative  of heart problems or had an unexpected or unexplained sudden death before age 35 years (including drowning or unexplained car crash)?       12. Does anyone in your family have a genetic heart problem such as hypertrophic cardiomyopathy (HCM), Marfan syndrome, arrhythmogenic right ventricular cardiomyopathy (ARVC), long QT syndrome (LQTS), short QT syndrome (SQTS), Brugada syndrome, or catecholaminergic polymorphic ventricular tachycardia (CPVT)?       13. Has anyone in your family had a pacemaker or an implanted defibrillator before age 35?                Name: William Chin YOB: 2011     BONE AND JOINT QUESTIONS Yes No   14. Have you ever had a stress fracture or an injury to a bone, muscle, ligament, joint, or tendon that caused you to miss a practice or game?       15. Do you have a bone, muscle, ligament, or joint injury that bothers you?       MEDICAL QUESTIONS Yes No   16. Do you cough,  wheeze, or have difficulty breathing during or after exercise?       17. Are you missing a kidney, an eye, a testicle (males), your spleen, or any other organ?       18. Do you have groin or testicle pain or a painful bulge or hernia in the groin area?       19. Do you have any recurring skin rashes or rashes that come and go, including herpes or methicillin-resistant Staphylococcus aureus  (MRSA)?       20. Have you had a concussion or head injury that caused confusion, a prolonged headache, or memory problems?       21. Have you ever had numbness, had tingling, had weakness in your arms or legs, or been unable to move your arms or legs after being hit or falling?       22. Have you ever become ill while exercising in the heat?       23. Do you or does someone in your family have sickle cell trait or disease?       24. Have you ever had or do you have any problems with your eyes or vision?        MEDICAL QUESTIONS (CONTINUED ) Yes No   25. Do you worry about your weight?         26. Are you trying to or has anyone recommended that you gain or lose weight?       27. Are you on a special diet or do you avoid certain types of foods or food groups?       28. Have you ever had an eating disorder?       FEMALES ONLY     29. Have you ever had a menstrual period?       30. How old were you when you had your first menstrual period?       31. When was your most recent menstrual period?       32. How many periods have you had in the past 12 months?         Explain \"Yes\" answers here.  ______________________________________________    ______________________________________________    ______________________________________________    ______________________________________________    ______________________________________________    ______________________________________________    ______________________________________________    ______________________________________________     I hereby state that, to the best of my  knowledge, my answers to the questions on this form are complete and correct.    Signature of athlete: ____________________________________________________________________________________    Signature of parent or guardian: ___________________________________________________________________________  Date: ________________________________________________  _____________________________________________________________________________________________________  © 2019 American Academy of Family Physicians, American Academy of Pediatrics, American College of Sports Medicine, American Medical Society for Sports Medicine, American Orthopaedic Society for Sports Medicine, and American Osteopathic Academy of Sports Medicine. Permission is granted to reprint for noncommercial, educational purposes with acknowledgment.   07-Aug-2023 03:51

## 2023-08-07 NOTE — CONSULT NOTE ADULT - ASSESSMENT
54 yo M with h/o ESRD on HD x6 years via LUE AVF (outpatient nephrologist: Dr. Shaka Sanchez), underwent DDR on 7/16/23, DM2 CAD, CHF, underwent CABG 2015, AICD (2016), and severe PVD (underwent R big toe and second toe amputation in 2021) here for abdominal pain and constipation also with DGF requiring iHD will plan for biopsy while here.

## 2023-08-07 NOTE — ED ADULT NURSE REASSESSMENT NOTE - TEMPERATURE IN CELSIUS (DEGREES C)
Fax received from [facility]. Certification and plan of care for patient. Goals for patient to remain free of falls and hospitalizations. Patient utilizes care with SN once weekly for 4 weeks, then once every 2 weeks for 4 weeks.     Certif 36.8

## 2023-08-07 NOTE — CONSULT NOTE ADULT - PROBLEM SELECTOR RECOMMENDATION 9
Pt. with h/o ESRD on HD. Underwent DDRT on 7/16/23. Post-transplant course significant for DGF. Still receiving iHD at [facility], last HD was 8/4/23. HD Consent obtained from patient and placed in chart. Pt. is non-oliguric. Pt. clinically stable. Labs reviewed. Plan for iHD treatment today. Continue with immunosuppression regimen, as outlined below. Monitor labs and urine output. Avoid nephrotoxins. Dose medications as per eGFR.    Change lasix PO BID daily to non-HD days.

## 2023-08-07 NOTE — ED PROVIDER NOTE - CLINICAL SUMMARY MEDICAL DECISION MAKING FREE TEXT BOX
53-year-old male history of end-stage renal disease is status post transplant by Dr. Charlie Corado on 7/16/23, hemodialysis Monday Wednesday Friday with [facility], diabetes, CHF status post CABG 2015, AICD, presents with 4 days of constipation and 2 days of decreased urine output. States has an urge to urinate but develops lower abd and rectal pain when bearing down. LIANA drain in place with no surrounding erythema or discharge, LLQ ttp. DDx includes but not limited to worsening transplant kidney function, SBO, UTI, large stool burden. Will obtain labs, CT noncon, renal US, UA, discuss with transplant.

## 2023-08-07 NOTE — ED ADULT TRIAGE NOTE - CHIEF COMPLAINT QUOTE
BIBEMS constipation x 4 days also c/o chest pain. Recent kidney transplant still on HD, b/l LE swelling, jaundice appearing

## 2023-08-07 NOTE — CONSULT NOTE ADULT - TIME BILLING
Kidney recipient with delayed functioning allograft  DM, HTN, CAD, PAD, Constipation  Borderline Non oliguric  Reviewed clinical, lab, imaging, data, medications  Suggestions  Hemodialysis today  Plan for allograft biopsy  Check DSA  Tacrolimus target level 8-10 n/ml  Maintain MAP >65 at hemodialysis  D/w transplant team  Agree with bowel regimen  I was present during and reviewed clinical and lab data as well as assessment and plan as documented by the house staff as noted. Please contact if any additional questions with any change in clinical condition or on availability of any additional information or reports.

## 2023-08-07 NOTE — ED PROVIDER NOTE - ATTENDING CONTRIBUTION TO CARE
Attending (Jesus Pittman D.O.):  I have personally seen and examined this patient. I have performed a substantive portion of the visit including all aspects of the medical decision making. Resident, fellow, student, and/or ACP note reviewed. I agree on the plan of care except where noted.    53M with hx of type 2 DM on insulin, CAD, CHF, s/p CABG 2015, AICD (2016), on hemodialysis for approximately 6 years via L AVF (nephrologist Dr. Bharath Romo), he has hx of PVD, is s/p 4 stents in R leg (mid and distal right superficial femoral artery, right popliteal x2 on 1/14/22).; is sp RT big toe and second toe amputation 2021, on asa and plavix for severe PVD (per pt). s/p DDRT on 7/16 on L here for worsening of constipation, tenesmus x3 days now w/ dec urination, patient states 2/2 rectal pain. Urinated 500cc yesterday. Compliant w/ meds. Denies fever, chills. Due for dialysis today. Hemodynam stable, NAD. LIANA drain w/ serosanguinous discharge. + abd distended, diffusely ttp, no jaundice. + rue brusing likely from recent hospitalization. + chronic LE swelling and pvd changes.+ blowing murmur, clear lungs, does not sound profoundly fluid overloaded. Plan to check labs, vbg, ua, CT a/p noncontrast, rejection med levels, trans kidney doppler on left. Discuss with transplant.

## 2023-08-07 NOTE — ED ADULT NURSE NOTE - NSFALLRISKINTERV_ED_ALL_ED

## 2023-08-07 NOTE — CONSULT NOTE ADULT - ASSESSMENT
52 y/o M with past medical history significant for HTN, CHF (s/p AICD 2016), CAD (s/p CABG 2015), IDDM, PVD s/p stent x4 in RLE with R big toe/second toe amputation (2021) and ESRD on HD via LUE AVF for 6 years now s/p DDRT 7/16/2023 with Thymoglobulin induction.   Post-transplant course complicated by severe constipation requiring disimpaction, DGF requiring hemodialysis and LUE swelling with concern for L subclavian thrombus, now s/p IR fistulogram 7/7/23 with balloon angioplasty of subclavian vein.   discharged home on 7/27/23 and remained on HD MWF   He presents from home via EMS to [facility] on 8/7/23 with abdominal discomfort and no bowel movement for 3 days.     UTI /ro  f/u cultures  abs   - CT A/P in ED with distended bladder, mild hydronephrosis  renal transpalnt sono  renal biopsy  - Samuels     Constipation  - Continue daily bowel regimen  - Enema PRN   - Ambulate OOB  will likely need disimpaction    #S/P DDRT 7/16/23 with persistent DGF   c/w meds as per renal transplant team     #Hx CAD, CABG, s/p AICD  - Continue meds    #Hx nIDDM now on insulin   - Continue home Lantus 23u qHS, Lispro 6u premeal  - POCT glucose ACHS   - CC renal diet   /dm diet

## 2023-08-07 NOTE — ED ADULT NURSE REASSESSMENT NOTE - NS ED NURSE REASSESS COMMENT FT1
pt requesting miralax after previously refusing, inpatient surgery team contacted at pager [phone], pending page back pt requesting miralax after previously refusing, inpatient surgery team contacted at pager [phone], surg paged back and stated that they do not have this patient, attempted to page pager [phone], pending page back

## 2023-08-07 NOTE — H&P ADULT - NSHPPHYSICALEXAM_GEN_ALL_CORE
General: Well appearing, resting comfortably in bed in no acute distress   Neuro: Grossly intact bilaterally   HEENT: Normocephalic, atraumatic, trachea midline, no JVD   Heart: Regular S1/S2, no murmurs rubs or gallops   Lungs: Unlabored breathing on RA; Clear to auscultation bilaterally, no adventitious sounds   Abdomen: Soft, non-distended, normoactive bowel sounds throughout, suprapubic tenderness without guarding/rebound/rigidity; LLQ transplant incision with some staples in place, small areas of epidermal opening, LIANA with SS output   Upper Extremities: No edema, freely mobile bilaterally; LUE AVF +thrill/bruit   Lower Extremities: 2+ pitting edema, SCDs in place, feet warm bilaterally; well healed R 1st/2nd toe amputation site; Hemosiderin staining on anterior shins   Skin: Warm, non-diaphoretic throughout

## 2023-08-07 NOTE — H&P ADULT - NSICDXPASTMEDICALHX_GEN_ALL_CORE_FT
PAST MEDICAL HISTORY:  Acute on chronic systolic heart failure     Coronary artery disease     Diabetes mellitus type 2    Foot ulcer due to secondary DM     H/O kidney transplant

## 2023-08-07 NOTE — ED PROVIDER NOTE - PROGRESS NOTE DETAILS
Discussed with transplant. Will evaluate patient. PVR 250cc in bladder. Discussed granda placement with patient, declining at this time.  -Deon Lamb PGY-2 Attending (Jesus Pittman D.O.):  Ct with concern for perinephric hemorrhage, UA with blood and rbcs, many bacteria, elevated wbcs and leuk esterase. Maybe falsely elevated wbc in setting of high rbcs as this can affect urine microscopy. CT chest with stable loculated effusion on left side. Discussed with transplant. Rec CFX, admission. Patient with hgb 7.3 -> 7.6 w/o intervention, remains hemodynam stable.

## 2023-08-07 NOTE — ED ADULT NURSE REASSESSMENT NOTE - NS ED NURSE REASSESS COMMENT FT1
Received patient from [facility] at 1900. Full code, AOx4, continent x2, independent. S/p kidney transplant (July 2023). Currently receiving dialysis (M/W/F) - left  av fistula. Went ot dialysis today - 1.5L removed. No apparent signs of acute discomfort/distress at this time. Infection/fall/safety protocol maintained. POC reviewed, Pt informed of call bell system and placed within reach.

## 2023-08-07 NOTE — H&P ADULT - ASSESSMENT
54 y/o M with past medical history significant for HTN, CHF (s/p AICD 2016), CAD (s/p CABG 2015), IDDM, PVD s/p stent x4 in RLE with R big toe/second toe amputation (2021) and ESRD on HD via LUE AVF for 6 years now s/p DDRT 7/16/2023 with Thymoglobulin induction. Post-transplant course complicated by severe constipation requiring disimpaction, DGF requiring hemodialysis and LUE swelling with concern for L subclavian thrombus, now s/p IR fistulogram 7/7/23 with balloon angioplasty of subclavian vein. He was discharged home on 7/27/23 and remained on HD MWF at home center [facility]. He presents from home via EMS to [facility] on 8/7/23 with abdominal discomfort and no bowel movement for 3 days.     #UTI w/ urinary retention  - UA with >60 WBC, +leukocytes  - Ceftriaxone 2g x1 in ED   - UCx pending   - CT A/P in ED with distended bladder, mild hydronephrosis  - Txp US pending   - Samuels to be placed for retention as PVR ~250ml after voiding 150mL     #Constipation  - Continue daily bowel regimen  - Enema PRN   - Ambulate OOB  - If no BM may need disimpaction    #S/P DDRT 7/16/23 with persistent DGF   - DGF: Remains on HD MWF, last HD 8/4, due for HD today  - Continue lasix 80mg PO BID, renvela 800 w/ meals   - Immuno: Env 8, MMF 1g BID, Pred 5  - Ppx: Valcyte, Bactrim, Nystatin, Pepcid     #Hx CAD, CABG, s/p AICD  - Continue ASA81, Lipitor    #Hx IDDM  - Continue home Lantus 23u qHS, Lispro 6u premeal  - POCT glucose ACHS   - CC renal diet

## 2023-08-08 ENCOUNTER — APPOINTMENT (OUTPATIENT)
Dept: ULTRASOUND IMAGING | Facility: HOSPITAL | Age: 53
End: 2023-08-08

## 2023-08-08 DIAGNOSIS — Z94.0 KIDNEY TRANSPLANT STATUS: ICD-10-CM

## 2023-08-08 LAB
ANION GAP SERPL CALC-SCNC: 16 MMOL/L — SIGNIFICANT CHANGE UP (ref 5–17)
BASOPHILS # BLD AUTO: 0 K/UL — SIGNIFICANT CHANGE UP (ref 0–0.2)
BASOPHILS NFR BLD AUTO: 0 % — SIGNIFICANT CHANGE UP (ref 0–2)
BLD GP AB SCN SERPL QL: NEGATIVE — SIGNIFICANT CHANGE UP
BUN SERPL-MCNC: 31 MG/DL — HIGH (ref 7–23)
CALCIUM SERPL-MCNC: 9.8 MG/DL — SIGNIFICANT CHANGE UP (ref 8.4–10.5)
CHLORIDE SERPL-SCNC: 96 MMOL/L — SIGNIFICANT CHANGE UP (ref 96–108)
CO2 SERPL-SCNC: 27 MMOL/L — SIGNIFICANT CHANGE UP (ref 22–31)
CREAT SERPL-MCNC: 3.43 MG/DL — HIGH (ref 0.5–1.3)
EGFR: 21 ML/MIN/1.73M2 — LOW
EOSINOPHIL # BLD AUTO: 0 K/UL — SIGNIFICANT CHANGE UP (ref 0–0.5)
EOSINOPHIL NFR BLD AUTO: 0 % — SIGNIFICANT CHANGE UP (ref 0–6)
GLUCOSE BLDC GLUCOMTR-MCNC: 140 MG/DL — HIGH (ref 70–99)
GLUCOSE BLDC GLUCOMTR-MCNC: 144 MG/DL — HIGH (ref 70–99)
GLUCOSE BLDC GLUCOMTR-MCNC: 210 MG/DL — HIGH (ref 70–99)
GLUCOSE BLDC GLUCOMTR-MCNC: 259 MG/DL — HIGH (ref 70–99)
GLUCOSE SERPL-MCNC: 124 MG/DL — HIGH (ref 70–99)
HCT VFR BLD CALC: 26.3 % — LOW (ref 39–50)
HGB BLD-MCNC: 8.4 G/DL — LOW (ref 13–17)
IMM GRANULOCYTES NFR BLD AUTO: 1.2 % — HIGH (ref 0–0.9)
LYMPHOCYTES # BLD AUTO: 0.04 K/UL — LOW (ref 1–3.3)
LYMPHOCYTES # BLD AUTO: 0.8 % — LOW (ref 13–44)
MAGNESIUM SERPL-MCNC: 2.2 MG/DL — SIGNIFICANT CHANGE UP (ref 1.6–2.6)
MCHC RBC-ENTMCNC: 31.2 PG — SIGNIFICANT CHANGE UP (ref 27–34)
MCHC RBC-ENTMCNC: 31.9 GM/DL — LOW (ref 32–36)
MCV RBC AUTO: 97.8 FL — SIGNIFICANT CHANGE UP (ref 80–100)
MONOCYTES # BLD AUTO: 0.36 K/UL — SIGNIFICANT CHANGE UP (ref 0–0.9)
MONOCYTES NFR BLD AUTO: 7.4 % — SIGNIFICANT CHANGE UP (ref 2–14)
NEUTROPHILS # BLD AUTO: 4.43 K/UL — SIGNIFICANT CHANGE UP (ref 1.8–7.4)
NEUTROPHILS NFR BLD AUTO: 90.6 % — HIGH (ref 43–77)
NRBC # BLD: 0 /100 WBCS — SIGNIFICANT CHANGE UP (ref 0–0)
PHOSPHATE SERPL-MCNC: 2.9 MG/DL — SIGNIFICANT CHANGE UP (ref 2.5–4.5)
PLATELET # BLD AUTO: 175 K/UL — SIGNIFICANT CHANGE UP (ref 150–400)
POTASSIUM SERPL-MCNC: 3.6 MMOL/L — SIGNIFICANT CHANGE UP (ref 3.5–5.3)
POTASSIUM SERPL-SCNC: 3.6 MMOL/L — SIGNIFICANT CHANGE UP (ref 3.5–5.3)
RBC # BLD: 2.69 M/UL — LOW (ref 4.2–5.8)
RBC # FLD: 18.8 % — HIGH (ref 10.3–14.5)
RH IG SCN BLD-IMP: POSITIVE — SIGNIFICANT CHANGE UP
SODIUM SERPL-SCNC: 139 MMOL/L — SIGNIFICANT CHANGE UP (ref 135–145)
TACROLIMUS SERPL-MCNC: 13 NG/ML — SIGNIFICANT CHANGE UP
WBC # BLD: 4.89 K/UL — SIGNIFICANT CHANGE UP (ref 3.8–10.5)
WBC # FLD AUTO: 4.89 K/UL — SIGNIFICANT CHANGE UP (ref 3.8–10.5)

## 2023-08-08 PROCEDURE — 99232 SBSQ HOSP IP/OBS MODERATE 35: CPT | Mod: GC,24

## 2023-08-08 PROCEDURE — 99232 SBSQ HOSP IP/OBS MODERATE 35: CPT | Mod: GC

## 2023-08-08 PROCEDURE — 93010 ELECTROCARDIOGRAM REPORT: CPT

## 2023-08-08 RX ORDER — LACTULOSE 10 G/15ML
30 SOLUTION ORAL ONCE
Refills: 0 | Status: COMPLETED | OUTPATIENT
Start: 2023-08-08 | End: 2023-08-08

## 2023-08-08 RX ORDER — INSULIN GLARGINE 100 [IU]/ML
8 INJECTION, SOLUTION SUBCUTANEOUS AT BEDTIME
Refills: 0 | Status: COMPLETED | OUTPATIENT
Start: 2023-08-08 | End: 2023-08-08

## 2023-08-08 RX ORDER — INSULIN GLARGINE 100 [IU]/ML
16 INJECTION, SOLUTION SUBCUTANEOUS AT BEDTIME
Refills: 0 | Status: DISCONTINUED | OUTPATIENT
Start: 2023-08-08 | End: 2023-08-08

## 2023-08-08 RX ORDER — SODIUM CHLORIDE 9 MG/ML
1000 INJECTION, SOLUTION INTRAVENOUS
Refills: 0 | Status: DISCONTINUED | OUTPATIENT
Start: 2023-08-08 | End: 2023-08-08

## 2023-08-08 RX ADMIN — MYCOPHENOLATE MOFETIL 1000 MILLIGRAM(S): 250 CAPSULE ORAL at 09:30

## 2023-08-08 RX ADMIN — CEFTRIAXONE 100 MILLIGRAM(S): 500 INJECTION, POWDER, FOR SOLUTION INTRAMUSCULAR; INTRAVENOUS at 09:33

## 2023-08-08 RX ADMIN — POLYETHYLENE GLYCOL 3350 17 GRAM(S): 17 POWDER, FOR SOLUTION ORAL at 05:01

## 2023-08-08 RX ADMIN — SENNA PLUS 2 TABLET(S): 8.6 TABLET ORAL at 19:55

## 2023-08-08 RX ADMIN — ATORVASTATIN CALCIUM 40 MILLIGRAM(S): 80 TABLET, FILM COATED ORAL at 19:55

## 2023-08-08 RX ADMIN — Medication 5 MILLIGRAM(S): at 05:01

## 2023-08-08 RX ADMIN — POLYETHYLENE GLYCOL 3350 17 GRAM(S): 17 POWDER, FOR SOLUTION ORAL at 17:55

## 2023-08-08 RX ADMIN — Medication 5 MILLIGRAM(S): at 19:55

## 2023-08-08 RX ADMIN — SEVELAMER CARBONATE 800 MILLIGRAM(S): 2400 POWDER, FOR SUSPENSION ORAL at 17:55

## 2023-08-08 RX ADMIN — LACTULOSE 30 GRAM(S): 10 SOLUTION ORAL at 21:38

## 2023-08-08 RX ADMIN — SEVELAMER CARBONATE 800 MILLIGRAM(S): 2400 POWDER, FOR SUSPENSION ORAL at 13:06

## 2023-08-08 RX ADMIN — Medication 80 MILLIGRAM(S): at 05:01

## 2023-08-08 RX ADMIN — INSULIN GLARGINE 8 UNIT(S): 100 INJECTION, SOLUTION SUBCUTANEOUS at 21:38

## 2023-08-08 RX ADMIN — Medication 500000 UNIT(S): at 05:00

## 2023-08-08 RX ADMIN — Medication 6 UNIT(S): at 09:34

## 2023-08-08 RX ADMIN — Medication 80 MILLIGRAM(S): at 13:06

## 2023-08-08 RX ADMIN — FAMOTIDINE 20 MILLIGRAM(S): 10 INJECTION INTRAVENOUS at 11:17

## 2023-08-08 RX ADMIN — Medication 100 MILLIGRAM(S): at 10:33

## 2023-08-08 RX ADMIN — Medication 1 TABLET(S): at 11:17

## 2023-08-08 RX ADMIN — Medication 6 UNIT(S): at 13:07

## 2023-08-08 RX ADMIN — Medication 6 UNIT(S): at 17:55

## 2023-08-08 RX ADMIN — Medication 500000 UNIT(S): at 17:55

## 2023-08-08 RX ADMIN — MYCOPHENOLATE MOFETIL 1000 MILLIGRAM(S): 250 CAPSULE ORAL at 19:55

## 2023-08-08 RX ADMIN — SEVELAMER CARBONATE 800 MILLIGRAM(S): 2400 POWDER, FOR SUSPENSION ORAL at 09:30

## 2023-08-08 RX ADMIN — Medication 500000 UNIT(S): at 21:40

## 2023-08-08 RX ADMIN — Medication 500000 UNIT(S): at 11:17

## 2023-08-08 NOTE — PROGRESS NOTE ADULT - PROBLEM SELECTOR PLAN 3
Pt. with anemia in the setting of ESRD/DGF. Hgb below goal. Check iron studies. Will order epo with HD sessions once weight is updated. Monitor hgb.

## 2023-08-08 NOTE — PROGRESS NOTE ADULT - ASSESSMENT
54 y/o M w/ DGF and UTI.     -500mg Solumedrol today  -Continue to hold ASA, IR biopsy tomorrow  -Regular diet  -Bowl regiment  -HD as usual    D/w Dr. Vizcaino    Transplant Surgery 52 y/o M w/ DGF and UTI.     -500mg Solumedrol today  -Continue to hold ASA, IR biopsy tomorrow  -Regular diet, NPO at MN w/ mIVF low dose  -Lantus adjusted for MN  -Bowl regiment  -HD as usual    D/w Dr. Vizcaino    Transplant Surgery

## 2023-08-08 NOTE — PROGRESS NOTE ADULT - PROBLEM SELECTOR PLAN 4
Phos is at goal for now. C/w home Sevelamer 800 mg tid. Recommend to continue with phosphorus binder. Low phosphorus diet. Monitor serum phosphorus, goal: 3.5-5.5    If you have any questions, please feel free to contact me  Shar Schaefer  Nephrology Fellow  [phone]; Prefer [employer] TEAMS  (After 5pm or on weekends please page the on-call fellow).

## 2023-08-08 NOTE — PROGRESS NOTE ADULT - ASSESSMENT
52 yo M with hx of DM2 CAD, CHF, s/p CABG 2015, AICD (2016), on hemodialysis for approximately 6 years via L AVF (nephrologist Dr. Bharath Romo), He has hx of PVD, is s/p 4 stents in R leg (mid and distal right superficial femoral artery, right popliteal x2 on 1/14/22).; is sp RT big toe and second toe amputation 2021, on asa and plavix for severe PVD (per pt). Now here for possible DDRT. Pt reports does not make urine only few drops occasionally. Pt denies N/V/D, fevers/chills, CP, SOB. Pt reports last ate at 4pm today and had HD yesterday 7/14/23.  Nephrology consulted for ESRD s/p DDRT.      s/p DDRT @ [facility] 7/16/2023   Initially received Simulect induction -- Changed to Thymoglobulin given delayed graft function.  Pt discharged on previous admission on HD.   Post transport course significant for DGF  Via L AVF --> s/p Left Radiocephalic fistulogram and subclavian vein angioplasty with Vascular 7/24/2023  Consent obtained from patient and placed in chart.  s/p CT A& P No contrast 8/7/23 --> partially imaged L pleural effusion, unchanged. Partially  imaged LLL opacity again seen.  small amount of dependent air within urinary bladder, small renal cysts, left transplant kidney with mil hydro. Stent extends from kidney to bladder.   s/p Doppler Renal US Transplant kidney 8/7/23 --> patent renal vasculature. Elevated renal artery peak systolic velocities. Concern for possible renal artery stenosis at anastomosis. Mild elevation  of intrarenal resistive indices , grossly unchanged from prior exam.   Continue Immunosuppression per transplant    IR consult noted -- PT planned for transplant renal Biopsy on Wednesday 8/9/23   Monitor labs and urine output. Avoid nephrotoxins  HD per transplant,   Dose medications as per eGFR.  Renal Diet   Monitor     Anemia  Mgmt per Transplant - planning for LUX with HD   Maintain Hgb > 8  Monitor     HTN:   BP optimal     Monitor     Proteinuria/Hemturia  likely in setting of + LE, bacteria  Mgmt per transplant

## 2023-08-08 NOTE — PROGRESS NOTE ADULT - SUBJECTIVE AND OBJECTIVE BOX
Patient is a 53y old  Male who presents with a chief complaint of UTI, fecal impaction (08 Aug 2023 09:58)      DATE OF SERVICE: 08-08-23 @ 16:16    SUBJECTIVE / OVERNIGHT EVENTS: overnight events noted    ROS:  Resp: No cough no sputum production  CVS: No chest pain no palpitations no orthopnea  GI: no N/V/D  : no dysuria, no hematuria  "I feel better"         MEDICATIONS  (STANDING):  atorvastatin 40 milliGRAM(s) Oral at bedtime  bisacodyl 5 milliGRAM(s) Oral at bedtime  cefTRIAXone   IVPB 1000 milliGRAM(s) IV Intermittent every 24 hours  famotidine    Tablet 20 milliGRAM(s) Oral daily  furosemide    Tablet 80 milliGRAM(s) Oral two times a day  insulin glargine Injectable (LANTUS) 16 Unit(s) SubCutaneous at bedtime  insulin lispro Injectable (ADMELOG) 6 Unit(s) SubCutaneous three times a day before meals  lactated ringers. 1000 milliLiter(s) (30 mL/Hr) IV Continuous <Continuous>  mycophenolate mofetil 1000 milliGRAM(s) Oral two times a day  nystatin    Suspension 940890 Unit(s) Oral four times a day  polyethylene glycol 3350 17 Gram(s) Oral two times a day  predniSONE   Tablet 5 milliGRAM(s) Oral daily  senna 2 Tablet(s) Oral at bedtime  sevelamer carbonate 800 milliGRAM(s) Oral three times a day with meals  trimethoprim   80 mG/sulfamethoxazole 400 mG 1 Tablet(s) Oral daily  valGANciclovir 450 milliGRAM(s) Oral <User Schedule>    MEDICATIONS  (PRN):        CAPILLARY BLOOD GLUCOSE      POCT Blood Glucose.: 144 mg/dL (08 Aug 2023 12:49)  POCT Blood Glucose.: 140 mg/dL (08 Aug 2023 09:28)  POCT Blood Glucose.: 138 mg/dL (07 Aug 2023 21:25)  POCT Blood Glucose.: 131 mg/dL (07 Aug 2023 18:13)    I&O's Summary    07 Aug 2023 07:01  -  08 Aug 2023 07:00  --------------------------------------------------------  IN: 120 mL / OUT: 1650 mL / NET: -1530 mL    08 Aug 2023 07:01  -  08 Aug 2023 16:16  --------------------------------------------------------  IN: 720 mL / OUT: 187.5 mL / NET: 532.5 mL        Vital Signs Last 24 Hrs  T(C): 36.9 (08 Aug 2023 13:58), Max: 37 (07 Aug 2023 20:20)  T(F): 98.4 (08 Aug 2023 13:58), Max: 98.6 (07 Aug 2023 20:20)  HR: 100 (08 Aug 2023 13:58) (73 - 124)  BP: 134/70 (08 Aug 2023 13:58) (100/52 - 135/71)  BP(mean): 88 (08 Aug 2023 13:14) (82 - 88)  RR: 18 (08 Aug 2023 13:58) (16 - 18)  SpO2: 100% (08 Aug 2023 13:58) (97% - 100%)    PHYSICAL EXAM:  GENERAL: in no apparent distress  HEAD:  Atraumatic, Normocephalic  EYES: EOMI, PERRLA, sclera clear  NECK: Supple, No JVD  CHEST/LUNG: clear b/l, no wheeze  HEART: S1 S2; no murmurs appreciated  ABDOMEN: Soft, Nontender, Bowel sounds present  EXTREMITIES:  No clubbing or cyanosis,  no edema  NEUROLOGY: AO x 3 non-focal  SKIN: No rashes or lesions    LABS:                        8.4    4.89  )-----------( 175      ( 08 Aug 2023 07:44 )             26.3     08-08    139  |  96  |  31<H>  ----------------------------<  124<H>  3.6   |  27  |  3.43<H>    Ca    9.8      08 Aug 2023 07:42  Phos  2.9     08-08  Mg     2.2     08-08    TPro  5.8<L>  /  Alb  3.4  /  TBili  0.4  /  DBili  x   /  AST  9<L>  /  ALT  9<L>  /  AlkPhos  67  08-07    PT/INR - ( 07 Aug 2023 06:58 )   PT: 11.2 sec;   INR: 1.07 ratio         PTT - ( 07 Aug 2023 06:58 )  PTT:30.0 sec      Urinalysis Basic - ( 08 Aug 2023 07:42 )    Color: x / Appearance: x / SG: x / pH: x  Gluc: 124 mg/dL / Ketone: x  / Bili: x / Urobili: x   Blood: x / Protein: x / Nitrite: x   Leuk Esterase: x / RBC: x / WBC x   Sq Epi: x / Non Sq Epi: x / Bacteria: x          All consultant(s) notes reviewed and care discussed with other providers        Contact Number, Dr Murphy [phone]

## 2023-08-08 NOTE — PROGRESS NOTE ADULT - SUBJECTIVE AND OBJECTIVE BOX
[facility]   MD DANIEL WALDEN MD RUORU WONG, PA    TEL:  FROM 9 AM to 5 PM ---OFFICE: [phone]    FROM 5 PM - 9 AM PLEASE CALL ANSWERING SERVICE: [phone]    RENAL FOLLOW UP NOTE--Date of Service 08-08-23 @ 09:17  --------------------------------------------------------------------------------  HPI:      Pt seen and examined at bedside.   Denies SOB, chest pain     PAST HISTORY  --------------------------------------------------------------------------------  No significant changes to PMH, PSH, FHx, SHx, unless otherwise noted    ALLERGIES & MEDICATIONS  --------------------------------------------------------------------------------  Allergies    Contrast. (Unknown)    Intolerances      Standing Inpatient Medications  atorvastatin 40 milliGRAM(s) Oral at bedtime  bisacodyl 5 milliGRAM(s) Oral at bedtime  cefTRIAXone   IVPB 1000 milliGRAM(s) IV Intermittent every 24 hours  famotidine    Tablet 20 milliGRAM(s) Oral daily  furosemide    Tablet 80 milliGRAM(s) Oral two times a day  insulin glargine Injectable (LANTUS) 23 Unit(s) SubCutaneous at bedtime  insulin lispro Injectable (ADMELOG) 6 Unit(s) SubCutaneous three times a day before meals  methylPREDNISolone sodium succinate IVPB 500 milliGRAM(s) IV Intermittent once  mycophenolate mofetil 1000 milliGRAM(s) Oral two times a day  nystatin    Suspension 338382 Unit(s) Oral four times a day  polyethylene glycol 3350 17 Gram(s) Oral two times a day  predniSONE   Tablet 5 milliGRAM(s) Oral daily  senna 2 Tablet(s) Oral at bedtime  sevelamer carbonate 800 milliGRAM(s) Oral three times a day with meals  trimethoprim   80 mG/sulfamethoxazole 400 mG 1 Tablet(s) Oral daily  valGANciclovir 450 milliGRAM(s) Oral <User Schedule>    PRN Inpatient Medications      REVIEW OF SYSTEMS  --------------------------------------------------------------------------------  General: no fever  MSK: trace edema     VITALS/PHYSICAL EXAM  --------------------------------------------------------------------------------  T(C): 36.8 (08-08-23 @ 04:54), Max: 37 (08-07-23 @ 20:20)  HR: 98 (08-08-23 @ 04:54) (73 - 109)  BP: 112/51 (08-08-23 @ 04:54) (100/52 - 130/69)  RR: 18 (08-08-23 @ 04:54) (16 - 18)  SpO2: 100% (08-08-23 @ 04:54) (96% - 100%)  Wt(kg): --  Height (cm): 175.3 (08-07-23 @ 20:20)      08-07-23 @ 07:01  -  08-08-23 @ 07:00  --------------------------------------------------------  IN: 120 mL / OUT: 1650 mL / NET: -1530 mL      Physical Exam:  	Gen: NAD  	HEENT: MMM  	Pulm: CTA B/L  	CV: S1S2  	Abd: Soft, +BS  	Ext: trace  LE edema B/L                      Neuro: Awake   	Skin: Warm and Dry   	Vascular access: avf            no kalee  LABS/STUDIES  --------------------------------------------------------------------------------              8.4    4.89  >-----------<  175      [08-08-23 @ 07:44]              26.3     139  |  96  |  31  ----------------------------<  124      [08-08-23 @ 07:42]  3.6   |  27  |  3.43        Ca     9.8     [08-08-23 @ 07:42]      Mg     2.2     [08-08-23 @ 07:42]      Phos  2.9     [08-08-23 @ 07:42]    TPro  5.8  /  Alb  3.4  /  TBili  0.4  /  DBili  x   /  AST  9   /  ALT  9   /  AlkPhos  67  [08-07-23 @ 06:58]    PT/INR: PT 11.2 , INR 1.07       [08-07-23 @ 06:58]  PTT: 30.0       [08-07-23 @ 06:58]      Creatinine Trend:  SCr 3.43 [08-08 @ 07:42]  SCr 5.01 [08-07 @ 06:58]  SCr 4.54 [08-07 @ 05:13]  SCr 5.66 [07-28 @ 10:57]  SCr 5.45 [07-27 @ 06:36]    Urinalysis - [08-08-23 @ 07:42]      Color  / Appearance  / SG  / pH       Gluc 124 / Ketone   / Bili  / Urobili        Blood  / Protein  / Leuk Est  / Nitrite       RBC  / WBC  / Hyaline  / Gran  / Sq Epi  / Non Sq Epi  / Bacteria         HBsAb 5.7      [07-15-23 @ 21:22]  HBsAg Nonreact      [07-15-23 @ 21:21]  HBcAb Nonreact      [07-15-23 @ 21:22]  HCV 0.16, Nonreact      [07-15-23 @ 21:22]  HIV Nonreact      [07-15-23 @ 21:21]

## 2023-08-08 NOTE — PROGRESS NOTE ADULT - PROBLEM SELECTOR PLAN 1
Pt. with h/o ESRD on HD. Underwent DDRT on 7/16/23. Post-transplant course significant for DGF. Still receiving iHD at [facility], last outpatient HD was 8/4/23. HD Consent obtained from patient and placed in chart. Pt. is non-oliguric. Pt. clinically stable. Labs reviewed. HD done Mon 8/7.     Continue with immunosuppression regimen, as outlined below. Monitor labs and urine output. Avoid nephrotoxins. Dose medications as per eGFR. Planed for renal biopsy on Wednesday.     Change lasix PO BID daily to non-HD days.  on ABX for UTI

## 2023-08-08 NOTE — PROGRESS NOTE ADULT - SUBJECTIVE AND OBJECTIVE BOX
Surgery Progress Note    Subjective:     Patient seen and examined at bedside. Received HD yesterday, had large BM. Urine cx not resulted yet.     OBJECTIVE:     T(C): 36.7 (08-08-23 @ 09:36), Max: 37 (08-07-23 @ 20:20)  HR: 93 (08-08-23 @ 09:36) (73 - 109)  BP: 135/71 (08-08-23 @ 09:36) (100/52 - 135/71)  RR: 18 (08-08-23 @ 09:36) (16 - 18)  SpO2: 99% (08-08-23 @ 09:36) (96% - 100%)  Wt(kg): --    I&O's Detail    07 Aug 2023 07:01  -  08 Aug 2023 07:00  --------------------------------------------------------  IN:    Oral Fluid: 120 mL  Total IN: 120 mL    OUT:    Bulb (mL): 90 mL    Other (mL): 1500 mL    Voided (mL): 60 mL  Total OUT: 1650 mL    Total NET: -1530 mL          PHYSICAL EXAM:    GENERAL: NAD, lying in bed comfortably  LUNGS: Comfortable on room air, lungs CTAB  ABDOMEN: Soft, non tender  EXTREMITIES: AVF    MEDICATIONS  (STANDING):  atorvastatin 40 milliGRAM(s) Oral at bedtime  bisacodyl 5 milliGRAM(s) Oral at bedtime  cefTRIAXone   IVPB 1000 milliGRAM(s) IV Intermittent every 24 hours  famotidine    Tablet 20 milliGRAM(s) Oral daily  furosemide    Tablet 80 milliGRAM(s) Oral two times a day  insulin glargine Injectable (LANTUS) 23 Unit(s) SubCutaneous at bedtime  insulin lispro Injectable (ADMELOG) 6 Unit(s) SubCutaneous three times a day before meals  methylPREDNISolone sodium succinate IVPB 500 milliGRAM(s) IV Intermittent once  mycophenolate mofetil 1000 milliGRAM(s) Oral two times a day  nystatin    Suspension 015087 Unit(s) Oral four times a day  polyethylene glycol 3350 17 Gram(s) Oral two times a day  predniSONE   Tablet 5 milliGRAM(s) Oral daily  senna 2 Tablet(s) Oral at bedtime  sevelamer carbonate 800 milliGRAM(s) Oral three times a day with meals  trimethoprim   80 mG/sulfamethoxazole 400 mG 1 Tablet(s) Oral daily  valGANciclovir 450 milliGRAM(s) Oral <User Schedule>    MEDICATIONS  (PRN):      LABS:                          8.4    4.89  )-----------( 175      ( 08 Aug 2023 07:44 )             26.3     08-08    139  |  96  |  31<H>  ----------------------------<  124<H>  3.6   |  27  |  3.43<H>    Ca    9.8      08 Aug 2023 07:42  Phos  2.9     08-08  Mg     2.2     08-08    TPro  5.8<L>  /  Alb  3.4  /  TBili  0.4  /  DBili  x   /  AST  9<L>  /  ALT  9<L>  /  AlkPhos  67  08-07    PT/INR - ( 07 Aug 2023 06:58 )   PT: 11.2 sec;   INR: 1.07 ratio         PTT - ( 07 Aug 2023 06:58 )  PTT:30.0 sec

## 2023-08-08 NOTE — PROGRESS NOTE ADULT - ASSESSMENT
54 y/o M with past medical history significant for HTN, CHF (s/p AICD 2016), CAD (s/p CABG 2015), IDDM, PVD s/p stent x4 in RLE with R big toe/second toe amputation (2021) and ESRD on HD via LUE AVF for 6 years now s/p DDRT 7/16/2023 with Thymoglobulin induction.   Post-transplant course complicated by severe constipation requiring disimpaction, DGF requiring hemodialysis and LUE swelling with concern for L subclavian thrombus, now s/p IR fistulogram 7/7/23 with balloon angioplasty of subclavian vein.   discharged home on 7/27/23 and remained on HD MWF   He presents from home via EMS to [facility] on 8/7/23 with abdominal discomfort and no bowel movement for 3 days.     UTI /ro  f/u cultures  abs   - CT A/P in ED with distended bladder, mild hydronephrosis  renal transpalnt sono  renal biopsy  - Samuels     Constipation  - Continue daily bowel regimen  - Enema PRN   - Ambulate OOB  will likely need disimpaction    #S/P DDRT 7/16/23 with persistent DGF   c/w meds as per renal transplant team     #Hx CAD, CABG, s/p AICD  - Continue meds    #Hx nIDDM now on insulin   - Continue home Lantus 23u qHS, Lispro 6u premeal  - POCT glucose ACHS   - CC renal diet   /dm diet

## 2023-08-08 NOTE — PROGRESS NOTE ADULT - PROBLEM SELECTOR PLAN 2
Current home immunosuppression regimen is Envarsus 8 mg qd, Cellcept 1 gm BID, and prednisone 5 mg qd. Tacro trough noted 12.3 yesterday, pending today. Will hold envarsus for now until repeat level back. Check tacrolimus trough 30 minutes prior to AM dose, goal: 8-10.

## 2023-08-08 NOTE — PROGRESS NOTE ADULT - SUBJECTIVE AND OBJECTIVE BOX
[facility] DIVISION OF KIDNEY DISEASES AND HYPERTENSION -- FOLLOW UP NOTE  --------------------------------------------------------------------------------  Chief Complaint: history of DDRT on 7/16/23  HPI:   54 yo M with h/o ESRD on HD x6 years via LUE AVF (outpatient nephrologist: Dr. Shaka Sanchez), underwent DDRT on 7/16/23, DM2 CAD, CHF, underwent CABG 2015, AICD (2016), and severe PVD (underwent R big toe and second toe amputation in 2021) here for abdominal pain and constipation. Patient had a DGF post op and is still continuing to require iHD. Goes to [facility] ([place], [place]). Last iHD was Friday, Aug 4th. Patient was planned to undergo a renal biopsy this week but had to be brought in by EMS for abdominal discomfort and constipation. Patient also with reduced intake of PO. Still makes urine. Denies any headaches, fevers/chills, chest pain, abdominal pain, and SOB.    24 hour events/subjective:  Patient received a session of iHD yesterday, tolerated well. Planned for renal biopsy on Wed. Denies any headaches, fevers/chills, chest pain, palpitations, SOB, and leg swelling.      PAST HISTORY  --------------------------------------------------------------------------------  No significant changes to PMH, PSH, FHx, SHx, unless otherwise noted    ALLERGIES & MEDICATIONS  --------------------------------------------------------------------------------  Allergies    Contrast. (Unknown)    Intolerances      Standing Inpatient Medications  atorvastatin 40 milliGRAM(s) Oral at bedtime  bisacodyl 5 milliGRAM(s) Oral at bedtime  cefTRIAXone   IVPB 1000 milliGRAM(s) IV Intermittent every 24 hours  famotidine    Tablet 20 milliGRAM(s) Oral daily  furosemide    Tablet 80 milliGRAM(s) Oral two times a day  insulin glargine Injectable (LANTUS) 23 Unit(s) SubCutaneous at bedtime  insulin lispro Injectable (ADMELOG) 6 Unit(s) SubCutaneous three times a day before meals  mycophenolate mofetil 1000 milliGRAM(s) Oral two times a day  nystatin    Suspension 524666 Unit(s) Oral four times a day  polyethylene glycol 3350 17 Gram(s) Oral two times a day  predniSONE   Tablet 5 milliGRAM(s) Oral daily  senna 2 Tablet(s) Oral at bedtime  sevelamer carbonate 800 milliGRAM(s) Oral three times a day with meals  trimethoprim   80 mG/sulfamethoxazole 400 mG 1 Tablet(s) Oral daily  valGANciclovir 450 milliGRAM(s) Oral <User Schedule>    PRN Inpatient Medications      REVIEW OF SYSTEMS  --------------------------------------------------------------------------------  per above    VITALS/PHYSICAL EXAM  --------------------------------------------------------------------------------  T(C): 36.8 (08-08-23 @ 04:54), Max: 37 (08-07-23 @ 20:20)  HR: 98 (08-08-23 @ 04:54) (73 - 109)  BP: 112/51 (08-08-23 @ 04:54) (100/52 - 130/69)  RR: 18 (08-08-23 @ 04:54) (16 - 18)  SpO2: 100% (08-08-23 @ 04:54) (96% - 100%)  Wt(kg): --  Height (cm): 175.3 (08-07-23 @ 20:20)      08-07-23 @ 07:01  -  08-08-23 @ 07:00  --------------------------------------------------------  IN: 120 mL / OUT: 1650 mL / NET: -1530 mL      Physical Exam:  Gen: NAD  HEENT: PERRL, supple neck, clear oropharynx  Pulm: CTA B/L  CV: RRR, S1S2;  Abd: +BS, soft, nontender/nondistended  : No suprapubic tenderness  Transplant: Surgical site is c/d/i  Extremities: No LE edema  Skin: Warm, without rashes  Vascular access: +LUE AVF with thrill and bruit appreciated      LABS/STUDIES  --------------------------------------------------------------------------------              7.6    3.98  >-----------<  162      [08-07-23 @ 06:58]              24.4     138  |  97  |  48  ----------------------------<  150      [08-07-23 @ 06:58]  3.8   |  23  |  5.01        Ca     10.0     [08-07-23 @ 06:58]      Mg     2.3     [08-07-23 @ 05:13]      Phos  3.7     [08-07-23 @ 05:13]    TPro  5.8  /  Alb  3.4  /  TBili  0.4  /  DBili  x   /  AST  9   /  ALT  9   /  AlkPhos  67  [08-07-23 @ 06:58]    PT/INR: PT 11.2 , INR 1.07       [08-07-23 @ 06:58]  PTT: 30.0       [08-07-23 @ 06:58]      Creatinine Trend:  SCr 5.01 [08-07 @ 06:58]  SCr 4.54 [08-07 @ 05:13]  SCr 5.66 [07-28 @ 10:57]  SCr 5.45 [07-27 @ 06:36]  SCr 8.03 [07-26 @ 07:03]    Tacrolimus (), Serum: 12.3 ng/mL (08-07 @ 05:13)            Urinalysis - [08-07-23 @ 06:58]      Color  / Appearance  / SG  / pH       Gluc 150 / Ketone   / Bili  / Urobili        Blood  / Protein  / Leuk Est  / Nitrite       RBC  / WBC  / Hyaline  / Gran  / Sq Epi  / Non Sq Epi  / Bacteria         HBsAb 5.7      [07-15-23 @ 21:22]  HBsAg Nonreact      [07-15-23 @ 21:21]  HBcAb Nonreact      [07-15-23 @ 21:22]  HCV 0.16, Nonreact      [07-15-23 @ 21:22]  HIV Nonreact      [07-15-23 @ 21:21]

## 2023-08-09 ENCOUNTER — RESULT REVIEW (OUTPATIENT)
Age: 53
End: 2023-08-09

## 2023-08-09 LAB
ANION GAP SERPL CALC-SCNC: 16 MMOL/L — SIGNIFICANT CHANGE UP (ref 5–17)
BASOPHILS # BLD AUTO: 0.01 K/UL — SIGNIFICANT CHANGE UP (ref 0–0.2)
BASOPHILS NFR BLD AUTO: 0.2 % — SIGNIFICANT CHANGE UP (ref 0–2)
BUN SERPL-MCNC: 43 MG/DL — HIGH (ref 7–23)
CALCIUM SERPL-MCNC: 10.1 MG/DL — SIGNIFICANT CHANGE UP (ref 8.4–10.5)
CHLORIDE SERPL-SCNC: 96 MMOL/L — SIGNIFICANT CHANGE UP (ref 96–108)
CO2 SERPL-SCNC: 22 MMOL/L — SIGNIFICANT CHANGE UP (ref 22–31)
CREAT SERPL-MCNC: 3.45 MG/DL — HIGH (ref 0.5–1.3)
EGFR: 20 ML/MIN/1.73M2 — LOW
EOSINOPHIL # BLD AUTO: 0 K/UL — SIGNIFICANT CHANGE UP (ref 0–0.5)
EOSINOPHIL NFR BLD AUTO: 0 % — SIGNIFICANT CHANGE UP (ref 0–6)
GLUCOSE BLDC GLUCOMTR-MCNC: 266 MG/DL — HIGH (ref 70–99)
GLUCOSE BLDC GLUCOMTR-MCNC: 270 MG/DL — HIGH (ref 70–99)
GLUCOSE BLDC GLUCOMTR-MCNC: 282 MG/DL — HIGH (ref 70–99)
GLUCOSE BLDC GLUCOMTR-MCNC: 292 MG/DL — HIGH (ref 70–99)
GLUCOSE SERPL-MCNC: 252 MG/DL — HIGH (ref 70–99)
HCT VFR BLD CALC: 26.4 % — LOW (ref 39–50)
HGB BLD-MCNC: 8.4 G/DL — LOW (ref 13–17)
IMM GRANULOCYTES NFR BLD AUTO: 1.1 % — HIGH (ref 0–0.9)
LYMPHOCYTES # BLD AUTO: 0.09 K/UL — LOW (ref 1–3.3)
LYMPHOCYTES # BLD AUTO: 1.7 % — LOW (ref 13–44)
MAGNESIUM SERPL-MCNC: 2.1 MG/DL — SIGNIFICANT CHANGE UP (ref 1.6–2.6)
MCHC RBC-ENTMCNC: 30.8 PG — SIGNIFICANT CHANGE UP (ref 27–34)
MCHC RBC-ENTMCNC: 31.8 GM/DL — LOW (ref 32–36)
MCV RBC AUTO: 96.7 FL — SIGNIFICANT CHANGE UP (ref 80–100)
MONOCYTES # BLD AUTO: 0.47 K/UL — SIGNIFICANT CHANGE UP (ref 0–0.9)
MONOCYTES NFR BLD AUTO: 8.9 % — SIGNIFICANT CHANGE UP (ref 2–14)
NEUTROPHILS # BLD AUTO: 4.63 K/UL — SIGNIFICANT CHANGE UP (ref 1.8–7.4)
NEUTROPHILS NFR BLD AUTO: 88.1 % — HIGH (ref 43–77)
NRBC # BLD: 0 /100 WBCS — SIGNIFICANT CHANGE UP (ref 0–0)
PHOSPHATE SERPL-MCNC: 3.4 MG/DL — SIGNIFICANT CHANGE UP (ref 2.5–4.5)
PLATELET # BLD AUTO: 178 K/UL — SIGNIFICANT CHANGE UP (ref 150–400)
POTASSIUM SERPL-MCNC: 4.1 MMOL/L — SIGNIFICANT CHANGE UP (ref 3.5–5.3)
POTASSIUM SERPL-SCNC: 4.1 MMOL/L — SIGNIFICANT CHANGE UP (ref 3.5–5.3)
RBC # BLD: 2.73 M/UL — LOW (ref 4.2–5.8)
RBC # FLD: 18.4 % — HIGH (ref 10.3–14.5)
SODIUM SERPL-SCNC: 134 MMOL/L — LOW (ref 135–145)
TACROLIMUS SERPL-MCNC: 6.9 NG/ML — SIGNIFICANT CHANGE UP
WBC # BLD: 5.26 K/UL — SIGNIFICANT CHANGE UP (ref 3.8–10.5)
WBC # FLD AUTO: 5.26 K/UL — SIGNIFICANT CHANGE UP (ref 3.8–10.5)

## 2023-08-09 PROCEDURE — 88313 SPECIAL STAINS GROUP 2: CPT | Mod: 26

## 2023-08-09 PROCEDURE — 50200 RENAL BIOPSY PERQ: CPT | Mod: LT

## 2023-08-09 PROCEDURE — 76942 ECHO GUIDE FOR BIOPSY: CPT | Mod: 26

## 2023-08-09 PROCEDURE — 88346 IMFLUOR 1ST 1ANTB STAIN PX: CPT | Mod: 26

## 2023-08-09 PROCEDURE — 88348 ELECTRON MICROSCOPY DX: CPT | Mod: 26

## 2023-08-09 PROCEDURE — 88305 TISSUE EXAM BY PATHOLOGIST: CPT | Mod: 26

## 2023-08-09 PROCEDURE — 88350 IMFLUOR EA ADDL 1ANTB STN PX: CPT | Mod: 26

## 2023-08-09 PROCEDURE — 99232 SBSQ HOSP IP/OBS MODERATE 35: CPT | Mod: GC

## 2023-08-09 PROCEDURE — 99232 SBSQ HOSP IP/OBS MODERATE 35: CPT | Mod: 24

## 2023-08-09 RX ORDER — INSULIN GLARGINE 100 [IU]/ML
20 INJECTION, SOLUTION SUBCUTANEOUS AT BEDTIME
Refills: 0 | Status: DISCONTINUED | OUTPATIENT
Start: 2023-08-09 | End: 2023-08-11

## 2023-08-09 RX ORDER — INSULIN LISPRO 100/ML
8 VIAL (ML) SUBCUTANEOUS
Refills: 0 | Status: DISCONTINUED | OUTPATIENT
Start: 2023-08-09 | End: 2023-08-10

## 2023-08-09 RX ORDER — INSULIN LISPRO 100/ML
VIAL (ML) SUBCUTANEOUS
Refills: 0 | Status: DISCONTINUED | OUTPATIENT
Start: 2023-08-09 | End: 2023-08-20

## 2023-08-09 RX ORDER — TACROLIMUS 5 MG/1
4 CAPSULE ORAL
Refills: 0 | Status: DISCONTINUED | OUTPATIENT
Start: 2023-08-10 | End: 2023-08-12

## 2023-08-09 RX ORDER — GLUCAGON INJECTION, SOLUTION 0.5 MG/.1ML
1 INJECTION, SOLUTION SUBCUTANEOUS ONCE
Refills: 0 | Status: DISCONTINUED | OUTPATIENT
Start: 2023-08-09 | End: 2023-08-20

## 2023-08-09 RX ORDER — SODIUM CHLORIDE 9 MG/ML
1000 INJECTION, SOLUTION INTRAVENOUS
Refills: 0 | Status: DISCONTINUED | OUTPATIENT
Start: 2023-08-09 | End: 2023-08-20

## 2023-08-09 RX ORDER — TACROLIMUS 5 MG/1
4 CAPSULE ORAL ONCE
Refills: 0 | Status: COMPLETED | OUTPATIENT
Start: 2023-08-09 | End: 2023-08-09

## 2023-08-09 RX ORDER — INSULIN LISPRO 100/ML
VIAL (ML) SUBCUTANEOUS AT BEDTIME
Refills: 0 | Status: DISCONTINUED | OUTPATIENT
Start: 2023-08-09 | End: 2023-08-20

## 2023-08-09 RX ORDER — DEXTROSE 50 % IN WATER 50 %
15 SYRINGE (ML) INTRAVENOUS ONCE
Refills: 0 | Status: DISCONTINUED | OUTPATIENT
Start: 2023-08-09 | End: 2023-08-20

## 2023-08-09 RX ORDER — DEXTROSE 50 % IN WATER 50 %
25 SYRINGE (ML) INTRAVENOUS ONCE
Refills: 0 | Status: DISCONTINUED | OUTPATIENT
Start: 2023-08-09 | End: 2023-08-20

## 2023-08-09 RX ORDER — DEXTROSE 50 % IN WATER 50 %
12.5 SYRINGE (ML) INTRAVENOUS ONCE
Refills: 0 | Status: DISCONTINUED | OUTPATIENT
Start: 2023-08-09 | End: 2023-08-20

## 2023-08-09 RX ORDER — INSULIN GLARGINE 100 [IU]/ML
18 INJECTION, SOLUTION SUBCUTANEOUS AT BEDTIME
Refills: 0 | Status: DISCONTINUED | OUTPATIENT
Start: 2023-08-09 | End: 2023-08-09

## 2023-08-09 RX ADMIN — FAMOTIDINE 20 MILLIGRAM(S): 10 INJECTION INTRAVENOUS at 12:16

## 2023-08-09 RX ADMIN — ATORVASTATIN CALCIUM 40 MILLIGRAM(S): 80 TABLET, FILM COATED ORAL at 20:13

## 2023-08-09 RX ADMIN — POLYETHYLENE GLYCOL 3350 17 GRAM(S): 17 POWDER, FOR SOLUTION ORAL at 06:12

## 2023-08-09 RX ADMIN — Medication 500000 UNIT(S): at 16:37

## 2023-08-09 RX ADMIN — MYCOPHENOLATE MOFETIL 1000 MILLIGRAM(S): 250 CAPSULE ORAL at 20:14

## 2023-08-09 RX ADMIN — Medication 5 MILLIGRAM(S): at 20:13

## 2023-08-09 RX ADMIN — INSULIN GLARGINE 20 UNIT(S): 100 INJECTION, SOLUTION SUBCUTANEOUS at 21:55

## 2023-08-09 RX ADMIN — Medication 80 MILLIGRAM(S): at 06:12

## 2023-08-09 RX ADMIN — Medication 1 TABLET(S): at 12:16

## 2023-08-09 RX ADMIN — Medication 8 UNIT(S): at 16:46

## 2023-08-09 RX ADMIN — Medication 500000 UNIT(S): at 06:12

## 2023-08-09 RX ADMIN — SENNA PLUS 2 TABLET(S): 8.6 TABLET ORAL at 20:14

## 2023-08-09 RX ADMIN — Medication 6: at 16:46

## 2023-08-09 RX ADMIN — TACROLIMUS 4 MILLIGRAM(S): 5 CAPSULE ORAL at 16:36

## 2023-08-09 RX ADMIN — MYCOPHENOLATE MOFETIL 1000 MILLIGRAM(S): 250 CAPSULE ORAL at 08:08

## 2023-08-09 RX ADMIN — Medication 50 MILLIGRAM(S): at 12:15

## 2023-08-09 RX ADMIN — CEFTRIAXONE 100 MILLIGRAM(S): 500 INJECTION, POWDER, FOR SOLUTION INTRAMUSCULAR; INTRAVENOUS at 08:08

## 2023-08-09 RX ADMIN — VALGANCICLOVIR 450 MILLIGRAM(S): 450 TABLET, FILM COATED ORAL at 08:08

## 2023-08-09 RX ADMIN — Medication 2: at 21:23

## 2023-08-09 RX ADMIN — Medication 500000 UNIT(S): at 23:54

## 2023-08-09 RX ADMIN — Medication 500000 UNIT(S): at 12:15

## 2023-08-09 RX ADMIN — POLYETHYLENE GLYCOL 3350 17 GRAM(S): 17 POWDER, FOR SOLUTION ORAL at 16:37

## 2023-08-09 RX ADMIN — SEVELAMER CARBONATE 800 MILLIGRAM(S): 2400 POWDER, FOR SUSPENSION ORAL at 16:36

## 2023-08-09 RX ADMIN — Medication 80 MILLIGRAM(S): at 16:36

## 2023-08-09 NOTE — PRE-ANESTHESIA EVALUATION ADULT - BP NONINVASIVE MEAN (MM HG)
100 Spiral Flap Text: The defect edges were debeveled with a #15 scalpel blade.  Given the location of the defect, shape of the defect and the proximity to free margins a spiral flap was deemed most appropriate.  Using a sterile surgical marker, an appropriate rotation flap was drawn incorporating the defect and placing the expected incisions within the relaxed skin tension lines where possible. The area thus outlined was incised deep to adipose tissue with a #15 scalpel blade.  The skin margins were undermined to an appropriate distance in all directions utilizing iris scissors.

## 2023-08-09 NOTE — PROGRESS NOTE ADULT - ASSESSMENT
Tee Salvador is a 54 y/o M with past medical history significant for HTN, CHF (s/p AICD 2016--last interrogated 7/15/23), CAD (s/p CABG 2015), IDDM, PVD s/p stent x4 in RLE with R big toe/second toe amputation (2021) and ESRD on HD via LUE AVF for 6 years now s/p DDRT 7/16/2023 with Thymoglobulin induction. Post-transplant course complicated by severe constipation requiring disimpaction, DGF requiring hemodialysis and LUE swelling with concern for L subclavian thrombus, now s/p IR fistulogram 7/7/23 with balloon angioplasty of subclavian vein. He was discharged home on 7/27/23 and remained on HD MWF at home center [facility]. He presents from home via EMS to [facility] on 8/7/23 with constipation and continued DGF    DGF s/p DDRT  -for IR biopsy today  -DSA+ : will continue pulse dose steroids (500 on 8/9, 250 today)  -HD per Nephrology, remains on Lasix 80BID  -UTI: UCx with GNR 50-99K, on CTX, f/u specation  -f/u BCx  -expect LIANA removal this admission  -bowel regimen  -diet as tolerated  -SCDs    Immunosuppression  -Envarsus per level  -MMF 1/1  -steroids as above  -PPx regimen: Valcyte/Bactrim/Nystatin/PPI with renal dosing    DM  -adjust while on steroids

## 2023-08-09 NOTE — PROGRESS NOTE ADULT - ASSESSMENT
52 yo M with h/o ESRD on HD x6 years via LUE AVF (outpatient nephrologist: Dr. Shaka Sanchez), underwent DDR on 7/16/23, DM2 CAD, CHF, underwent CABG 2015, AICD (2016), and severe PVD (underwent R big toe and second toe amputation in 2021) here for abdominal pain and constipation also with DGF requiring iHD, renal biopsy to be done this admission

## 2023-08-09 NOTE — PROGRESS NOTE ADULT - PROBLEM SELECTOR PLAN 2
Current home immunosuppression regimen is Envarsus 8 mg qd, Cellcept 1 gm BID, and prednisone 5 mg qd. Tacro trough noted 13 yesterday, pending today. Will hold envarsus for now until repeat level back. Check tacrolimus trough 30 minutes prior to AM dose, goal: 8-10.    Patient on pulse dose steroids 8/8-

## 2023-08-09 NOTE — PROGRESS NOTE ADULT - SUBJECTIVE AND OBJECTIVE BOX
Transplant Surgery - Multidisciplinary Progress Note  --------------------------------------------------------------  DDRT 7/16/2023    Present:   Patient seen and examined with multidisciplinary Transplant team including  Surgeon: Dr. Vizcaino,  Nephrologist: Dr. Hairston, Dr. Schaefer, SHERIE Brock and unit RN during am rounds.  Disciplines not in attendance will be notified of the plan.     Tee Salvador is a 54 y/o M with past medical history significant for HTN, CHF (s/p AICD 2016--last interrogated 7/15/23), CAD (s/p CABG 2015), IDDM, PVD s/p stent x4 in RLE with R big toe/second toe amputation (2021) and ESRD on HD via LUE AVF for 6 years now s/p DDRT 7/16/2023 with Thymoglobulin induction. Post-transplant course complicated by severe constipation requiring disimpaction, DGF requiring hemodialysis and LUE swelling with concern for L subclavian thrombus, now s/p IR fistulogram 7/7/23 with balloon angioplasty of subclavian vein. He was discharged home on 7/27/23 and remained on HD MWF at home center [facility]. He presents from home via EMS to [facility] on 8/7/23 with constipation and continued DGF    Interval Events:  Afebrile on CTX, VSS  DGF, HD continued  tolerating steroids  constipation resolved    Immunosuppression:  Envarsus per level  MMF 1/1  pulse steroids    Potential Discharge date: TBD    Education:  Medications    Plan of care:  See Below    MEDICATIONS  (STANDING):  atorvastatin 40 milliGRAM(s) Oral at bedtime  bisacodyl 5 milliGRAM(s) Oral at bedtime  cefTRIAXone   IVPB 1000 milliGRAM(s) IV Intermittent every 24 hours  dextrose 5%. 1000 milliLiter(s) (50 mL/Hr) IV Continuous <Continuous>  dextrose 5%. 1000 milliLiter(s) (100 mL/Hr) IV Continuous <Continuous>  dextrose 50% Injectable 25 Gram(s) IV Push once  dextrose 50% Injectable 12.5 Gram(s) IV Push once  dextrose 50% Injectable 25 Gram(s) IV Push once  famotidine    Tablet 20 milliGRAM(s) Oral daily  furosemide    Tablet 80 milliGRAM(s) Oral two times a day  glucagon  Injectable 1 milliGRAM(s) IntraMuscular once  insulin glargine Injectable (LANTUS) 18 Unit(s) SubCutaneous at bedtime  insulin lispro (ADMELOG) corrective regimen sliding scale   SubCutaneous at bedtime  insulin lispro (ADMELOG) corrective regimen sliding scale   SubCutaneous three times a day before meals  insulin lispro Injectable (ADMELOG) 8 Unit(s) SubCutaneous three times a day before meals  methylPREDNISolone sodium succinate IVPB 250 milliGRAM(s) IV Intermittent once  mycophenolate mofetil 1000 milliGRAM(s) Oral two times a day  nystatin    Suspension 379777 Unit(s) Oral four times a day  polyethylene glycol 3350 17 Gram(s) Oral two times a day  senna 2 Tablet(s) Oral at bedtime  sevelamer carbonate 800 milliGRAM(s) Oral three times a day with meals  trimethoprim   80 mG/sulfamethoxazole 400 mG 1 Tablet(s) Oral daily  valGANciclovir 450 milliGRAM(s) Oral <User Schedule>    MEDICATIONS  (PRN):  dextrose Oral Gel 15 Gram(s) Oral once PRN Blood Glucose LESS THAN 70 milliGRAM(s)/deciliter      PAST MEDICAL & SURGICAL HISTORY:  Diabetes mellitus  type 2      Foot ulcer due to secondary DM      Coronary artery disease      Acute on chronic systolic heart failure      H/O kidney transplant      Breast Reduction  at age 18      Toe amputation status, left      S/P CABG (coronary artery bypass graft)      AICD (automatic cardioverter/defibrillator) present          Vital Signs Last 24 Hrs  T(C): 36.6 (09 Aug 2023 09:22), Max: 36.9 (08 Aug 2023 13:58)  T(F): 97.9 (09 Aug 2023 09:22), Max: 98.4 (08 Aug 2023 13:58)  HR: 98 (09 Aug 2023 09:22) (96 - 124)  BP: 142/79 (09 Aug 2023 09:22) (111/77 - 165/81)  BP(mean): 100 (09 Aug 2023 09:22) (88 - 100)  RR: 18 (09 Aug 2023 09:22) (18 - 18)  SpO2: 97% (09 Aug 2023 09:22) (97% - 100%)    Parameters below as of 09 Aug 2023 09:22  Patient On (Oxygen Delivery Method): room air        I&O's Summary    08 Aug 2023 07:01  -  09 Aug 2023 07:00  --------------------------------------------------------  IN: 1200 mL / OUT: 677.5 mL / NET: 522.5 mL    09 Aug 2023 07:01  -  09 Aug 2023 11:56  --------------------------------------------------------  IN: 0 mL / OUT: 200 mL / NET: -200 mL                              8.4    5.26  )-----------( 178      ( 09 Aug 2023 06:41 )             26.4     08-09    134<L>  |  96  |  43<H>  ----------------------------<  252<H>  4.1   |  22  |  3.45<H>    Ca    10.1      09 Aug 2023 06:41  Phos  3.4     08-09  Mg     2.1     08-09      Tacrolimus (), Serum: 6.9 ng/mL (08-09 @ 06:38)          Review of systems  Gen: No weight changes, fatigue, fevers/chills, weakness  Skin: No rashes  Head/Eyes/Ears/Mouth: No headache; Normal hearing; Normal vision w/o blurriness; No sinus pain/discomfort, sore throat  Respiratory: No dyspnea, cough, wheezing, hemoptysis  CV: No chest pain, PND, orthopnea  GI: no abdominal pain, denies diarrhea, constipation, nausea, vomiting, melena, hematochezia  : No increased frequency, dysuria, hematuria, nocturia  MSK: No joint pain/swelling; no back pain; no edema  Neuro: No dizziness/lightheadedness, weakness, seizures, numbness, tingling  Heme: No easy bruising or bleeding  Endo: No heat/cold intolerance  Psych: No significant nervousness, anxiety, stress, depression  All other systems were reviewed and are negative, except as noted.      PHYSICAL EXAM:  Constitutional: Well developed / well nourished  Eyes: Anicteric, PERRLA  ENMT: nc/at  Neck: supple  Respiratory: CTA B/L  Cardiovascular: RRR  Gastrointestinal: Soft, ND/NT.  well healed incisional scar. LIANA 50 serous  Genitourinary: Voiding spontaneously  Extremities: SCD's in place and working bilaterally  Vascular: Palpable dp pulses bilaterally  Neurological: A&O x3  Skin: no rashes, ulcerations or lesions;  Musculoskeletal: Moving all extremities  Psychiatric: Responsive

## 2023-08-09 NOTE — PROCEDURE NOTE - PROCEDURE FINDINGS AND DETAILS
History of Present Illness


General


Chief Complaint:  Chest Pain


Source:  Patient





Present Illness


HPI


Is a 57-year-old female with history of diabetes and hypertension.  Also 

history esophagitis.  She presents with multiple complaints.  She complaining 

of dizziness and lightheadedness for the whole day.  Also with coughing 

congestion.  Now with chest pain for last few hours.  Denies any fever or 

chills.  Pain is centrally located.  No radiation.  7/10.  No exertional 

component.  Also with bilateral lower extremity burning sensation.  Nothing 

made it better nothing made it worse.


Allergies:  


Coded Allergies:  


     MORPHINE (Verified  Allergy, Unknown, 2/22/16)





Patient History


Past Medical History:  see triage record, old chart reviewed, DM, HTN


Past Surgical History:  other


Pertinent Family History:  none


Social History:  Denies: smoking


Pregnant Now:  No


Immunizations:  other


Reviewed Nursing Documentation:  PMH: Agreed, PSxH: Agreed





Nursing Documentation-PMH


Hx Cardiac Problems:  Yes


Hx Hypertension:  Yes


Hx Diabetes:  Yes - type 2


Hx Cancer:  Yes - BREAST


Hx Gastrointestinal Problems:  No


Hx Neurological Problems:  No





Review of Systems


Eye:  Denies: blurred vision, eye pain


ENT:  Denies: ear pain, nose congestion, throat swelling


Respiratory:  Reports: cough, Denies: shortness of breath


Cardiovascular:  Reports: chest pain, Denies: palpitations


Gastrointestinal:  Denies: abdominal pain, diarrhea, nausea, vomiting


Musculoskeletal:  Denies: back pain, joint pain


Skin:  Denies: rash


Neurological:  Denies: headache, numbness


Endocrine:  Denies: increased thirst, increased urine


Hematologic/Lymphatic:  Denies: easy bruising


All Other Systems:  negative except mentioned in HPI





Physical Exam





Vital Signs








  Date Time  Temp Pulse Resp B/P Pulse Ox O2 Delivery O2 Flow Rate FiO2


 


2/16/17 00:21 97.9 112 23 131/76 100 Room Air  





vitals with tachycardia


Sp02 EP Interpretation:  reviewed, normal


General Appearance:  well appearing, no apparent distress, alert


Head:  normocephalic, atraumatic


Eyes:  bilateral eye EOMI, bilateral eye PERRL


ENT:  hearing grossly normal, normal pharynx


Neck:  full range of motion, supple, no meningismus


Respiratory:  chest non-tender, lungs clear, normal breath sounds


Cardiovascular #1:  regular rate, rhythm, no murmur


Gastrointestinal:  normal bowel sounds, non tender, no mass, no organomegaly, 

no bruit, non-distended


Musculoskeletal:  back normal, gait/station normal, normal range of motion


Neurologic:  alert, oriented x3


Psychiatric:  anxious


Skin:  warm/dry





Medical Decision Making


Diagnostic Impression:  


 Primary Impression:  


 Chest pain


 Qualified Codes:  R07.9 - Chest pain, unspecified


 Additional Impressions:  


 ACS (acute coronary syndrome)


 Dizziness


 Peripheral neuropathy


 Obesity (BMI 30-39.9)


 Acute renal insufficiency


ER Course


Patient presents with atypical chest pain.  She has multiple risk factors for 

ACS.  First set of troponin negative.  Will admit for serial EKG and rule out.  

No evidence of PE, dissection to name a few.  Her dizziness he anxiety related 

could be viral illness.  No evidence of TIA or CVA.  No evidence of neoplastic 

process.


Lab Results Impression


Labs with abnormal creatinine.


EKG Diagnostic Results


Rate:  normal, tachycardiac


Rhythm:  NSR


ST Segments:  no acute changes





Rhythm Strip Diag. Results


EP Interpretation:  yes


Rate:  98


Rhythm:  NSR, no PVC's, no ectopy





Chest X-Ray Diagnostic Results


EP Interpretation:  Yes


Findings:  no consolidation, no effusion, no pneumothorax, no acute 

cardiopulmonary disease


Number of Views:  1





CT/MRI/US Diagnostic Results


CT/MRI/US Diagnostic Results :  


   Imaging Test Ordered:  ct head


   Impression


neg per radiologist.





Last Vital Signs








  Date Time  Temp Pulse Resp B/P Pulse Ox O2 Delivery O2 Flow Rate FiO2


 


2/16/17 00:21 97.9 112 23 131/76 100 Room Air  








Status:  improved


Disposition:  ADMITTED AS INPATIENT


Condition:  Serious











MEGHAN BARRETT M.D. Feb 16, 2017 00:34
US kidney biopsy of LLQ transplant using 18 g needle via 17 g guide needle.  3 cores obtained and deemed adequate by histopathologist.  Site embolized using gelfoam pledget.  Full report to follow.

## 2023-08-09 NOTE — PROGRESS NOTE ADULT - SUBJECTIVE AND OBJECTIVE BOX
[facility] DIVISION OF KIDNEY DISEASES AND HYPERTENSION -- FOLLOW UP NOTE  --------------------------------------------------------------------------------  Chief Complaint: history of DDRT on 7/16/23    HPI:   54 yo M with h/o ESRD on HD x6 years via LUE AVF (outpatient nephrologist: Dr. Shaka Sanchez), underwent DDRT on 7/16/23, DM2 CAD, CHF, underwent CABG 2015, AICD (2016), and severe PVD (underwent R big toe and second toe amputation in 2021) here for abdominal pain and constipation. Patient had a DGF post op and is still continuing to require iHD. Goes to [facility]. Last iHD was Friday, Aug 4th. Patient was planned to undergo a renal biopsy this week but had to be brought in by EMS for abdominal discomfort and constipation. Patient also with reduced intake of PO. Still makes urine. Denies any headaches, fevers/chills, chest pain, abdominal pain, and SOB.    24 hour events/subjective:  Planned for renal biopsy today. Had another large bowel movement today. Denies any headaches, fevers/chills, chest pain, palpitations, SOB, and leg swelling.    PAST HISTORY  --------------------------------------------------------------------------------  No significant changes to PMH, PSH, FHx, SHx, unless otherwise noted    ALLERGIES & MEDICATIONS  --------------------------------------------------------------------------------  Allergies    Contrast. (Unknown)    Intolerances      Standing Inpatient Medications  atorvastatin 40 milliGRAM(s) Oral at bedtime  bisacodyl 5 milliGRAM(s) Oral at bedtime  cefTRIAXone   IVPB 1000 milliGRAM(s) IV Intermittent every 24 hours  famotidine    Tablet 20 milliGRAM(s) Oral daily  furosemide    Tablet 80 milliGRAM(s) Oral two times a day  insulin lispro Injectable (ADMELOG) 6 Unit(s) SubCutaneous three times a day before meals  mycophenolate mofetil 1000 milliGRAM(s) Oral two times a day  nystatin    Suspension 860603 Unit(s) Oral four times a day  polyethylene glycol 3350 17 Gram(s) Oral two times a day  senna 2 Tablet(s) Oral at bedtime  sevelamer carbonate 800 milliGRAM(s) Oral three times a day with meals  trimethoprim   80 mG/sulfamethoxazole 400 mG 1 Tablet(s) Oral daily  valGANciclovir 450 milliGRAM(s) Oral <User Schedule>    PRN Inpatient Medications      REVIEW OF SYSTEMS  --------------------------------------------------------------------------------  per above     VITALS/PHYSICAL EXAM  --------------------------------------------------------------------------------  T(C): 36.5 (08-09-23 @ 05:46), Max: 36.9 (08-08-23 @ 13:58)  HR: 110 (08-09-23 @ 05:46) (93 - 124)  BP: 165/81 (08-09-23 @ 05:46) (111/77 - 165/81)  RR: 18 (08-09-23 @ 05:46) (18 - 18)  SpO2: 98% (08-09-23 @ 05:46) (98% - 100%)  Wt(kg): --  Height (cm): 175.3 (08-07-23 @ 20:20)      08-08-23 @ 07:01  -  08-09-23 @ 07:00  --------------------------------------------------------  IN: 1200 mL / OUT: 677.5 mL / NET: 522.5 mL      Physical Exam:  Gen: NAD  HEENT: PERRL, supple neck, clear oropharynx  Pulm: CTA B/L  CV: RRR, S1S2;  Abd: +BS, soft, nontender/nondistended  : No suprapubic tenderness  Extremities: No LE edema  Skin: Warm, without rashes  Vascular access: +LUE AVF with thrill and bruit appreciated      LABS/STUDIES  --------------------------------------------------------------------------------              8.4    5.26  >-----------<  178      [08-09-23 @ 06:41]              26.4     139  |  96  |  31  ----------------------------<  124      [08-08-23 @ 07:42]  3.6   |  27  |  3.43        Ca     9.8     [08-08-23 @ 07:42]      Mg     2.2     [08-08-23 @ 07:42]      Phos  2.9     [08-08-23 @ 07:42]            Creatinine Trend:  SCr 3.43 [08-08 @ 07:42]  SCr 5.01 [08-07 @ 06:58]  SCr 4.54 [08-07 @ 05:13]  SCr 5.66 [07-28 @ 10:57]  SCr 5.45 [07-27 @ 06:36]    Tacrolimus (), Serum: 13.0 ng/mL (08-08 @ 07:42)  Tacrolimus (), Serum: 12.3 ng/mL (08-07 @ 05:13)            Urinalysis - [08-08-23 @ 07:42]      Color  / Appearance  / SG  / pH       Gluc 124 / Ketone   / Bili  / Urobili        Blood  / Protein  / Leuk Est  / Nitrite       RBC  / WBC  / Hyaline  / Gran  / Sq Epi  / Non Sq Epi  / Bacteria         HBsAb 5.7      [07-15-23 @ 21:22]  HBsAg Nonreact      [07-15-23 @ 21:21]  HBcAb Nonreact      [07-15-23 @ 21:22]  HCV 0.16, Nonreact      [07-15-23 @ 21:22]  HIV Nonreact      [07-15-23 @ 21:21]

## 2023-08-09 NOTE — PROGRESS NOTE ADULT - SUBJECTIVE AND OBJECTIVE BOX
Interventional Radiology    HPI: 53y Male with trasnplant kidney for biopsy.    Allergies: Contrast. (Unknown)    Medications (Abx/Cardiac/Anticoagulation/Blood Products)    cefTRIAXone   IVPB: 100 mL/Hr IV Intermittent ( @ 08:08)  furosemide    Tablet: 80 milliGRAM(s) Oral ( @ 06:12)  nystatin    Suspension: 101637 Unit(s) Oral ( @ 12:15)  trimethoprim   80 mG/sulfamethoxazole 400 m Tablet(s) Oral ( @ 12:16)  valGANciclovir: 450 milliGRAM(s) Oral ( @ 08:08)    Data:  175.3  T(C): 36.6  HR: 94  BP: 133/67  RR: 18  SpO2: 99%    Exam  General: No acute distress  Chest: Non labored breathing  Abdomen: Non-distended  Extremities: No swelling, warm    -WBC 5.26 / HgB 8.4 / Hct 26.4 / Plt 178  -Na 134 / Cl 96 / BUN 43 / Glucose 252  -K 4.1 / CO2 22 / Cr 3.45  -ALT -- / Alk Phos -- / T.Bili --  -INR1.07    Imaging:     Plan: 53y Male presents for transplant renal biopsy.  -Risks/Benefits/alternatives explained with the patient and/or healthcare proxy and witnessed informed consent obtained.

## 2023-08-09 NOTE — PROGRESS NOTE ADULT - SUBJECTIVE AND OBJECTIVE BOX
Subjective: Patient seen and examined. No new events except as noted.     SUBJECTIVE/ROS:  No chest pain, dyspnea, palpitation, or dizziness.       MEDICATIONS:  MEDICATIONS  (STANDING):  atorvastatin 40 milliGRAM(s) Oral at bedtime  bisacodyl 5 milliGRAM(s) Oral at bedtime  cefTRIAXone   IVPB 1000 milliGRAM(s) IV Intermittent every 24 hours  dextrose 5%. 1000 milliLiter(s) (50 mL/Hr) IV Continuous <Continuous>  dextrose 5%. 1000 milliLiter(s) (100 mL/Hr) IV Continuous <Continuous>  dextrose 50% Injectable 25 Gram(s) IV Push once  dextrose 50% Injectable 12.5 Gram(s) IV Push once  dextrose 50% Injectable 25 Gram(s) IV Push once  famotidine    Tablet 20 milliGRAM(s) Oral daily  furosemide    Tablet 80 milliGRAM(s) Oral two times a day  glucagon  Injectable 1 milliGRAM(s) IntraMuscular once  insulin glargine Injectable (LANTUS) 18 Unit(s) SubCutaneous at bedtime  insulin lispro (ADMELOG) corrective regimen sliding scale   SubCutaneous at bedtime  insulin lispro (ADMELOG) corrective regimen sliding scale   SubCutaneous three times a day before meals  insulin lispro Injectable (ADMELOG) 8 Unit(s) SubCutaneous three times a day before meals  methylPREDNISolone sodium succinate IVPB 250 milliGRAM(s) IV Intermittent once  mycophenolate mofetil 1000 milliGRAM(s) Oral two times a day  nystatin    Suspension 132415 Unit(s) Oral four times a day  polyethylene glycol 3350 17 Gram(s) Oral two times a day  senna 2 Tablet(s) Oral at bedtime  sevelamer carbonate 800 milliGRAM(s) Oral three times a day with meals  trimethoprim   80 mG/sulfamethoxazole 400 mG 1 Tablet(s) Oral daily  valGANciclovir 450 milliGRAM(s) Oral <User Schedule>      PHYSICAL EXAM:  T(C): 36.6 (08-09-23 @ 09:22), Max: 36.9 (08-08-23 @ 13:58)  HR: 98 (08-09-23 @ 09:22) (96 - 124)  BP: 142/79 (08-09-23 @ 09:22) (111/77 - 165/81)  RR: 18 (08-09-23 @ 09:22) (18 - 18)  SpO2: 97% (08-09-23 @ 09:22) (97% - 100%)  Wt(kg): --  I&O's Summary    08 Aug 2023 07:01  -  09 Aug 2023 07:00  --------------------------------------------------------  IN: 1200 mL / OUT: 677.5 mL / NET: 522.5 mL    09 Aug 2023 07:01  -  09 Aug 2023 12:02  --------------------------------------------------------  IN: 0 mL / OUT: 200 mL / NET: -200 mL            JVP: Normal  Neck: supple  Lung: clear   CV: S1 S2 , Murmur:  Abd: soft  Ext: No edema  neuro: Awake / alert  Psych: flat affect  Skin: normal``    LABS/DATA:    CARDIAC MARKERS:                                8.4    5.26  )-----------( 178      ( 09 Aug 2023 06:41 )             26.4     08-09    134<L>  |  96  |  43<H>  ----------------------------<  252<H>  4.1   |  22  |  3.45<H>    Ca    10.1      09 Aug 2023 06:41  Phos  3.4     08-09  Mg     2.1     08-09      proBNP:   Lipid Profile:   HgA1c:   TSH:     TELE:  EKG:

## 2023-08-09 NOTE — PRE-ANESTHESIA EVALUATION ADULT - NSANTHPEFT_GEN_ALL_CORE
PHYSICAL EXAM:  GENERAL: NAD, afebrile  CHEST/LUNG: Breathing comfortably on room air   HEART: Regular rate and rhythm  NEUROLOGY: AAO*3, non-focal

## 2023-08-09 NOTE — PROGRESS NOTE ADULT - PROBLEM SELECTOR PLAN 4
Phos is at goal for now. C/w home Sevelamer 800 mg tid. Recommend to continue with phosphorus binder. Low phosphorus diet. Monitor serum phosphorus, goal: 3.5-5.5    If you have any questions, please feel free to contact me  Shar Schaefer  Nephrology Fellow  [phone]; Prefer [employer] TEAMS  (After 5pm or on weekends please page the on-call fellow).

## 2023-08-09 NOTE — PROGRESS NOTE ADULT - ASSESSMENT
54 yo M with hx of DM2 CAD, CHF, s/p CABG 2015, AICD (2016), on hemodialysis for approximately 6 years via L AVF (nephrologist Dr. Bharath Romo), He has hx of PVD, is s/p 4 stents in R leg (mid and distal right superficial femoral artery, right popliteal x2 on 1/14/22).; is sp RT big toe and second toe amputation 2021, on asa and plavix for severe PVD (per pt). Now here for possible DDRT. Pt reports does not make urine only few drops occasionally. Pt denies N/V/D, fevers/chills, CP, SOB. Pt reports last ate at 4pm today and had HD yesterday 7/14/23.  Nephrology consulted for ESRD s/p DDRT.            s/p DDRT @ [facility] 7/16/2023   Initially received Simulect induction -- Changed to Thymoglobulin given delayed graft function.  Pt discharged on previous admission on HD.   Post transport course significant for DGF  Via L AVF --> s/p Left Radiocephalic fistulogram and subclavian vein angioplasty with Vascular 7/24/2023  Consent obtained from patient and placed in chart.  s/p CT A& P No contrast 8/7/23 --> partially imaged L pleural effusion, unchanged. Partially  imaged LLL opacity again seen.  small amount of dependent air within urinary bladder, small renal cysts, left transplant kidney with mil hydro. Stent extends from kidney to bladder.   s/p Doppler Renal US Transplant kidney 8/7/23 --> patent renal vasculature. Elevated renal artery peak systolic velocities. Concern for possible renal artery stenosis at anastomosis. Mild elevation  of intrarenal resistive indices , grossly unchanged from prior exam.   Continue Immunosuppression per transplant : Envarsus 4 mg qd, Cellcept 1 gm BID, and prednisone 5 mg qd. Tacro trough is 6.9.   s/p Transplant renal biopsy today   Monitor labs and urine output. Avoid nephrotoxins  HD per transplant,   Dose medications as per eGFR.  Renal Diet   Monitor     Anemia  Mgmt per Transplant - planning for LUX with HD   Maintain Hgb > 8  Monitor     HTN:   BP optimal     Monitor     Proteinuria/Hemturia  likely in setting of + LE, bacteria  Mgmt per transplant

## 2023-08-09 NOTE — PROGRESS NOTE ADULT - PROBLEM SELECTOR PLAN 1
Pt. with h/o ESRD on HD. Underwent DDRT on 7/16/23. Post-transplant course significant for DGF. Still receiving iHD at [facility], last outpatient HD was 8/4/23. HD Consent obtained from patient and placed in chart. Pt. is non-oliguric. Pt. clinically stable. Labs reviewed. HD done Mon 8/7.     Continue with immunosuppression regimen, as outlined below. Monitor labs and urine output. Avoid nephrotoxins. Dose medications as per eGFR. Planed for renal biopsy on Wednesday.      on ABX for UTI. Ceft 8/6-

## 2023-08-09 NOTE — PROGRESS NOTE ADULT - SUBJECTIVE AND OBJECTIVE BOX
[facility] [place] [facility]PHROLOGY PRACTICE   MD DANIEL WALDEN MD BRIANA PETITO, NP    TEL:  FROM 9 AM to 5 PM ---OFFICE: [phone]  FROM 5 PM - 9 AM PLEASE CALL ANSWERING SERVICE: [phone]     RENAL PROGRESS NOTE: DATE OF SERVICE 08-09-23 @ 17:23  Patient is a 53y old  Male who presents with a chief complaint of UTI, fecal impaction  Patient seen and examined at bedside. No chest pain/sob    VITALS:  T(F): 97.8 (08-09-23 @ 16:39), Max: 98.1 (08-08-23 @ 20:02)  HR: 89 (08-09-23 @ 16:39)  BP: 129/69 (08-09-23 @ 16:39)  RR: 18 (08-09-23 @ 16:39)  SpO2: 99% (08-09-23 @ 16:39)  Wt(kg): --    08-08 @ 07:01  -  08-09 @ 07:00  --------------------------------------------------------  IN: 1200 mL / OUT: 677.5 mL / NET: 522.5 mL    08-09 @ 07:01  -  08-09 @ 17:23  --------------------------------------------------------  IN: 0 mL / OUT: 200 mL / NET: -200 mL      Height (cm): 175.3 (08-09 @ 14:18)    PHYSICAL EXAM:  Constitutional: NAD  Neck: No JVD  Respiratory: CTAB, no wheezes, rales or rhonchi  Cardiovascular: S1, S2, RRR  Gastrointestinal: BS+, soft, NT/ND  Extremities: No peripheral edema    Hospital Medications:   MEDICATIONS  (STANDING):  atorvastatin 40 milliGRAM(s) Oral at bedtime  bisacodyl 5 milliGRAM(s) Oral at bedtime  cefTRIAXone   IVPB 1000 milliGRAM(s) IV Intermittent every 24 hours  dextrose 5%. 1000 milliLiter(s) (50 mL/Hr) IV Continuous <Continuous>  dextrose 5%. 1000 milliLiter(s) (100 mL/Hr) IV Continuous <Continuous>  dextrose 50% Injectable 12.5 Gram(s) IV Push once  dextrose 50% Injectable 25 Gram(s) IV Push once  dextrose 50% Injectable 25 Gram(s) IV Push once  famotidine    Tablet 20 milliGRAM(s) Oral daily  furosemide    Tablet 80 milliGRAM(s) Oral two times a day  glucagon  Injectable 1 milliGRAM(s) IntraMuscular once  insulin glargine Injectable (LANTUS) 18 Unit(s) SubCutaneous at bedtime  insulin lispro (ADMELOG) corrective regimen sliding scale   SubCutaneous three times a day before meals  insulin lispro (ADMELOG) corrective regimen sliding scale   SubCutaneous at bedtime  insulin lispro Injectable (ADMELOG) 8 Unit(s) SubCutaneous three times a day before meals  mycophenolate mofetil 1000 milliGRAM(s) Oral two times a day  nystatin    Suspension 560448 Unit(s) Oral four times a day  polyethylene glycol 3350 17 Gram(s) Oral two times a day  senna 2 Tablet(s) Oral at bedtime  sevelamer carbonate 800 milliGRAM(s) Oral three times a day with meals  trimethoprim   80 mG/sulfamethoxazole 400 mG 1 Tablet(s) Oral daily  valGANciclovir 450 milliGRAM(s) Oral <User Schedule>    Tacrolimus (), Serum: 6.9 ng/mL (08-09 @ 06:38)    LABS:  08-09    134<L>  |  96  |  43<H>  ----------------------------<  252<H>  4.1   |  22  |  3.45<H>    Ca    10.1      09 Aug 2023 06:41  Phos  3.4     08-09  Mg     2.1     08-09      Creatinine Trend: 3.45 <--, 3.43 <--, 5.01 <--, 4.54 <--    Phosphorus: 3.4 mg/dL (08-09 @ 06:41)                              8.4    5.26  )-----------( 178      ( 09 Aug 2023 06:41 )             26.4     Urine Studies:  Urinalysis - [08-09-23 @ 06:41]      Color  / Appearance  / SG  / pH       Gluc 252 / Ketone   / Bili  / Urobili        Blood  / Protein  / Leuk Est  / Nitrite       RBC  / WBC  / Hyaline  / Gran  / Sq Epi  / Non Sq Epi  / Bacteria         HBsAb 5.7      [07-15-23 @ 21:22]  HBsAg Nonreact      [07-15-23 @ 21:21]  HBcAb Nonreact      [07-15-23 @ 21:22]  HCV 0.16, Nonreact      [07-15-23 @ 21:22]  HIV Nonreact      [07-15-23 @ 21:21]      RADIOLOGY & ADDITIONAL STUDIES:

## 2023-08-09 NOTE — PRE-ANESTHESIA EVALUATION ADULT - NSRADCARDRESULTSFT_GEN_ALL_CORE
TTE 7/16/23   1. Patient reportedly refused administration of an ultrasound enhancing agent.   2. Normal left ventricular cavity size. The left ventricular wall thickness is normal. There are no regional wall motion abnormalities seen.   3. Normal right ventricular cavity size and normal right ventricular systolic function.   4. Device lead is visualized in the right heart.

## 2023-08-09 NOTE — PRE-ANESTHESIA EVALUATION ADULT - NSANTHPMHFT_GEN_ALL_CORE
52 y/o M with pmhx HTN, CHF (s/p AICD 2016), CAD (s/p CABG 2015), IDDM, PVD s/p stent x4 in RLE with R big toe/second toe amputation (2021) and ESRD on HD via LUE AVF for 6 years s/p DDRT 7/16/2023, presenting for transplant kidney biopsy.    Denies cp, sob, and GERD-like symptoms.

## 2023-08-10 LAB
-  AMIKACIN: SIGNIFICANT CHANGE UP
-  AMOXICILLIN/CLAVULANIC ACID: SIGNIFICANT CHANGE UP
-  AMPICILLIN/SULBACTAM: SIGNIFICANT CHANGE UP
-  AMPICILLIN: SIGNIFICANT CHANGE UP
-  AZTREONAM: SIGNIFICANT CHANGE UP
-  CEFAZOLIN: SIGNIFICANT CHANGE UP
-  CEFEPIME: SIGNIFICANT CHANGE UP
-  CEFOXITIN: SIGNIFICANT CHANGE UP
-  CEFTRIAXONE: SIGNIFICANT CHANGE UP
-  CEFUROXIME: SIGNIFICANT CHANGE UP
-  CIPROFLOXACIN: SIGNIFICANT CHANGE UP
-  ERTAPENEM: SIGNIFICANT CHANGE UP
-  GENTAMICIN: SIGNIFICANT CHANGE UP
-  IMIPENEM: SIGNIFICANT CHANGE UP
-  LEVOFLOXACIN: SIGNIFICANT CHANGE UP
-  MEROPENEM: SIGNIFICANT CHANGE UP
-  NITROFURANTOIN: SIGNIFICANT CHANGE UP
-  PIPERACILLIN/TAZOBACTAM: SIGNIFICANT CHANGE UP
-  TOBRAMYCIN: SIGNIFICANT CHANGE UP
-  TRIMETHOPRIM/SULFAMETHOXAZOLE: SIGNIFICANT CHANGE UP
ANION GAP SERPL CALC-SCNC: 18 MMOL/L — HIGH (ref 5–17)
BASOPHILS # BLD AUTO: 0 K/UL — SIGNIFICANT CHANGE UP (ref 0–0.2)
BASOPHILS NFR BLD AUTO: 0 % — SIGNIFICANT CHANGE UP (ref 0–2)
BUN SERPL-MCNC: 56 MG/DL — HIGH (ref 7–23)
CALCIUM SERPL-MCNC: 10 MG/DL — SIGNIFICANT CHANGE UP (ref 8.4–10.5)
CHLORIDE SERPL-SCNC: 96 MMOL/L — SIGNIFICANT CHANGE UP (ref 96–108)
CO2 SERPL-SCNC: 23 MMOL/L — SIGNIFICANT CHANGE UP (ref 22–31)
CREAT SERPL-MCNC: 3.72 MG/DL — HIGH (ref 0.5–1.3)
CULTURE RESULTS: SIGNIFICANT CHANGE UP
EGFR: 19 ML/MIN/1.73M2 — LOW
EOSINOPHIL # BLD AUTO: 0 K/UL — SIGNIFICANT CHANGE UP (ref 0–0.5)
EOSINOPHIL NFR BLD AUTO: 0 % — SIGNIFICANT CHANGE UP (ref 0–6)
GLUCOSE BLDC GLUCOMTR-MCNC: 261 MG/DL — HIGH (ref 70–99)
GLUCOSE BLDC GLUCOMTR-MCNC: 269 MG/DL — HIGH (ref 70–99)
GLUCOSE BLDC GLUCOMTR-MCNC: 281 MG/DL — HIGH (ref 70–99)
GLUCOSE BLDC GLUCOMTR-MCNC: 304 MG/DL — HIGH (ref 70–99)
GLUCOSE SERPL-MCNC: 307 MG/DL — HIGH (ref 70–99)
HCT VFR BLD CALC: 24.9 % — LOW (ref 39–50)
HGB BLD-MCNC: 8 G/DL — LOW (ref 13–17)
IMM GRANULOCYTES NFR BLD AUTO: 1.1 % — HIGH (ref 0–0.9)
LYMPHOCYTES # BLD AUTO: 0.05 K/UL — LOW (ref 1–3.3)
LYMPHOCYTES # BLD AUTO: 1.1 % — LOW (ref 13–44)
MAGNESIUM SERPL-MCNC: 2.1 MG/DL — SIGNIFICANT CHANGE UP (ref 1.6–2.6)
MCHC RBC-ENTMCNC: 31.3 PG — SIGNIFICANT CHANGE UP (ref 27–34)
MCHC RBC-ENTMCNC: 32.1 GM/DL — SIGNIFICANT CHANGE UP (ref 32–36)
MCV RBC AUTO: 97.3 FL — SIGNIFICANT CHANGE UP (ref 80–100)
METHOD TYPE: SIGNIFICANT CHANGE UP
MONOCYTES # BLD AUTO: 0.32 K/UL — SIGNIFICANT CHANGE UP (ref 0–0.9)
MONOCYTES NFR BLD AUTO: 7.3 % — SIGNIFICANT CHANGE UP (ref 2–14)
MYCOPHENOLATE SERPL-MCNC: 5.6 UG/ML — HIGH (ref 1–3.5)
MYCOPHENOLIC ACID GLUCURONIDE: 232 UG/ML — HIGH (ref 15–125)
NEUTROPHILS # BLD AUTO: 3.96 K/UL — SIGNIFICANT CHANGE UP (ref 1.8–7.4)
NEUTROPHILS NFR BLD AUTO: 90.5 % — HIGH (ref 43–77)
NRBC # BLD: 0 /100 WBCS — SIGNIFICANT CHANGE UP (ref 0–0)
ORGANISM # SPEC MICROSCOPIC CNT: SIGNIFICANT CHANGE UP
ORGANISM # SPEC MICROSCOPIC CNT: SIGNIFICANT CHANGE UP
PHOSPHATE SERPL-MCNC: 3.6 MG/DL — SIGNIFICANT CHANGE UP (ref 2.5–4.5)
PLATELET # BLD AUTO: 171 K/UL — SIGNIFICANT CHANGE UP (ref 150–400)
POTASSIUM SERPL-MCNC: 4.2 MMOL/L — SIGNIFICANT CHANGE UP (ref 3.5–5.3)
POTASSIUM SERPL-SCNC: 4.2 MMOL/L — SIGNIFICANT CHANGE UP (ref 3.5–5.3)
RBC # BLD: 2.56 M/UL — LOW (ref 4.2–5.8)
RBC # FLD: 18.4 % — HIGH (ref 10.3–14.5)
SODIUM SERPL-SCNC: 137 MMOL/L — SIGNIFICANT CHANGE UP (ref 135–145)
SPECIMEN SOURCE: SIGNIFICANT CHANGE UP
TACROLIMUS SERPL-MCNC: 7.2 NG/ML — SIGNIFICANT CHANGE UP
WBC # BLD: 4.38 K/UL — SIGNIFICANT CHANGE UP (ref 3.8–10.5)
WBC # FLD AUTO: 4.38 K/UL — SIGNIFICANT CHANGE UP (ref 3.8–10.5)

## 2023-08-10 PROCEDURE — 99232 SBSQ HOSP IP/OBS MODERATE 35: CPT | Mod: GC

## 2023-08-10 PROCEDURE — 99223 1ST HOSP IP/OBS HIGH 75: CPT

## 2023-08-10 PROCEDURE — 99232 SBSQ HOSP IP/OBS MODERATE 35: CPT | Mod: 24

## 2023-08-10 RX ORDER — INSULIN LISPRO 100/ML
10 VIAL (ML) SUBCUTANEOUS
Refills: 0 | Status: DISCONTINUED | OUTPATIENT
Start: 2023-08-10 | End: 2023-08-13

## 2023-08-10 RX ORDER — CEFAZOLIN SODIUM 1 G
1000 VIAL (EA) INJECTION EVERY 8 HOURS
Refills: 0 | Status: COMPLETED | OUTPATIENT
Start: 2023-08-11 | End: 2023-08-14

## 2023-08-10 RX ORDER — ASPIRIN/CALCIUM CARB/MAGNESIUM 324 MG
81 TABLET ORAL DAILY
Refills: 0 | Status: DISCONTINUED | OUTPATIENT
Start: 2023-08-11 | End: 2023-08-20

## 2023-08-10 RX ADMIN — INSULIN GLARGINE 20 UNIT(S): 100 INJECTION, SOLUTION SUBCUTANEOUS at 22:12

## 2023-08-10 RX ADMIN — POLYETHYLENE GLYCOL 3350 17 GRAM(S): 17 POWDER, FOR SOLUTION ORAL at 05:59

## 2023-08-10 RX ADMIN — TACROLIMUS 4 MILLIGRAM(S): 5 CAPSULE ORAL at 08:03

## 2023-08-10 RX ADMIN — MYCOPHENOLATE MOFETIL 1000 MILLIGRAM(S): 250 CAPSULE ORAL at 08:01

## 2023-08-10 RX ADMIN — SEVELAMER CARBONATE 800 MILLIGRAM(S): 2400 POWDER, FOR SUSPENSION ORAL at 17:12

## 2023-08-10 RX ADMIN — Medication 8 UNIT(S): at 09:18

## 2023-08-10 RX ADMIN — ATORVASTATIN CALCIUM 40 MILLIGRAM(S): 80 TABLET, FILM COATED ORAL at 20:09

## 2023-08-10 RX ADMIN — Medication 2: at 22:11

## 2023-08-10 RX ADMIN — Medication 8: at 09:19

## 2023-08-10 RX ADMIN — Medication 8 UNIT(S): at 17:14

## 2023-08-10 RX ADMIN — Medication 6: at 17:15

## 2023-08-10 RX ADMIN — Medication 500000 UNIT(S): at 12:21

## 2023-08-10 RX ADMIN — Medication 500000 UNIT(S): at 05:58

## 2023-08-10 RX ADMIN — Medication 80 MILLIGRAM(S): at 05:58

## 2023-08-10 RX ADMIN — SEVELAMER CARBONATE 800 MILLIGRAM(S): 2400 POWDER, FOR SUSPENSION ORAL at 12:21

## 2023-08-10 RX ADMIN — Medication 8 UNIT(S): at 12:54

## 2023-08-10 RX ADMIN — Medication 50 MILLIGRAM(S): at 10:15

## 2023-08-10 RX ADMIN — FAMOTIDINE 20 MILLIGRAM(S): 10 INJECTION INTRAVENOUS at 12:22

## 2023-08-10 RX ADMIN — POLYETHYLENE GLYCOL 3350 17 GRAM(S): 17 POWDER, FOR SOLUTION ORAL at 17:13

## 2023-08-10 RX ADMIN — Medication 5 MILLIGRAM(S): at 20:09

## 2023-08-10 RX ADMIN — Medication 6: at 12:55

## 2023-08-10 RX ADMIN — MYCOPHENOLATE MOFETIL 1000 MILLIGRAM(S): 250 CAPSULE ORAL at 20:09

## 2023-08-10 RX ADMIN — Medication 500000 UNIT(S): at 17:12

## 2023-08-10 RX ADMIN — Medication 80 MILLIGRAM(S): at 12:57

## 2023-08-10 RX ADMIN — Medication 500000 UNIT(S): at 23:17

## 2023-08-10 RX ADMIN — Medication 1 TABLET(S): at 12:22

## 2023-08-10 RX ADMIN — CEFTRIAXONE 100 MILLIGRAM(S): 500 INJECTION, POWDER, FOR SOLUTION INTRAMUSCULAR; INTRAVENOUS at 09:13

## 2023-08-10 RX ADMIN — SEVELAMER CARBONATE 800 MILLIGRAM(S): 2400 POWDER, FOR SUSPENSION ORAL at 08:01

## 2023-08-10 RX ADMIN — SENNA PLUS 2 TABLET(S): 8.6 TABLET ORAL at 20:09

## 2023-08-10 NOTE — PROGRESS NOTE ADULT - SUBJECTIVE AND OBJECTIVE BOX
Transplant Surgery - Multidisciplinary Progress Note  --------------------------------------------------------------  DDRT 7/16/2023    Present:   Patient seen and examined with multidisciplinary Transplant team including  Surgeon: Dr. Vizcaino, Dr. Calero, PGY 3 Dr. Montalvo. NP Devon,  Nephrologist: Dr. Hairston/Dr. Schaefer and unit RN during am rounds.  Disciplines not in attendance will be notified of the plan.     HPI: Tee Salvador is a 52 y/o M with past medical history significant for HTN, CHF (s/p AICD 2016--last interrogated 7/15/23), CAD (s/p CABG 2015), IDDM, PVD s/p stent x4 in RLE with R big toe/second toe amputation (2021) and ESRD on HD via LUE AVF for 6 years now s/p DDRT 7/16/2023 with Thymoglobulin induction. Post-transplant course complicated by severe constipation requiring disimpaction, DGF requiring hemodialysis and LUE swelling with concern for L subclavian thrombus, now s/p IR fistulogram 7/7/23 with balloon angioplasty of subclavian vein. He was discharged home on 7/27/23 and remained on HD MWF at home center [facility]. He presents from home via EMS to [facility] on 8/7/23 with constipation and continued DGF    Interval Events:  Afebrile on CTX, VSS  DGF, HD continued  S/P IR biopdy yesterday  Pulse steroid with medrol 250mg IV x1  tolerating steroids  constipation resolved    Immunosuppression:  Envarsus per level  MMF 1/1  pulse steroids    Potential Discharge date: TBD    Education:  Medications    Plan of care:  See Below       MEDICATIONS  (STANDING):  atorvastatin 40 milliGRAM(s) Oral at bedtime  bisacodyl 5 milliGRAM(s) Oral at bedtime  dextrose 5%. 1000 milliLiter(s) (50 mL/Hr) IV Continuous <Continuous>  dextrose 5%. 1000 milliLiter(s) (100 mL/Hr) IV Continuous <Continuous>  dextrose 50% Injectable 12.5 Gram(s) IV Push once  dextrose 50% Injectable 25 Gram(s) IV Push once  dextrose 50% Injectable 25 Gram(s) IV Push once  famotidine    Tablet 20 milliGRAM(s) Oral daily  furosemide    Tablet 80 milliGRAM(s) Oral two times a day  glucagon  Injectable 1 milliGRAM(s) IntraMuscular once  insulin glargine Injectable (LANTUS) 20 Unit(s) SubCutaneous at bedtime  insulin lispro (ADMELOG) corrective regimen sliding scale   SubCutaneous three times a day before meals  insulin lispro (ADMELOG) corrective regimen sliding scale   SubCutaneous at bedtime  insulin lispro Injectable (ADMELOG) 8 Unit(s) SubCutaneous three times a day before meals  mycophenolate mofetil 1000 milliGRAM(s) Oral two times a day  nystatin    Suspension 808623 Unit(s) Oral four times a day  polyethylene glycol 3350 17 Gram(s) Oral two times a day  senna 2 Tablet(s) Oral at bedtime  sevelamer carbonate 800 milliGRAM(s) Oral three times a day with meals  tacrolimus ER Tablet (ENVARSUS XR) 4 milliGRAM(s) Oral <User Schedule>  trimethoprim   80 mG/sulfamethoxazole 400 mG 1 Tablet(s) Oral daily  valGANciclovir 450 milliGRAM(s) Oral <User Schedule>    MEDICATIONS  (PRN):  dextrose Oral Gel 15 Gram(s) Oral once PRN Blood Glucose LESS THAN 70 milliGRAM(s)/deciliter      PAST MEDICAL & SURGICAL HISTORY:  Diabetes mellitus  type 2      Foot ulcer due to secondary DM      Coronary artery disease      Acute on chronic systolic heart failure      H/O kidney transplant      Breast Reduction  at age 18      Toe amputation status, left      S/P CABG (coronary artery bypass graft)      AICD (automatic cardioverter/defibrillator) present          Vital Signs Last 24 Hrs  T(C): 36.1 (10 Aug 2023 13:00), Max: 36.9 (09 Aug 2023 21:00)  T(F): 97 (10 Aug 2023 13:00), Max: 98.4 (09 Aug 2023 21:00)  HR: 93 (10 Aug 2023 13:00) (80 - 96)  BP: 135/67 (10 Aug 2023 13:00) (117/64 - 135/67)  BP(mean): 65 (09 Aug 2023 16:05) (65 - 100)  RR: 18 (10 Aug 2023 13:00) (15 - 18)  SpO2: 99% (10 Aug 2023 13:00) (97% - 100%)    Parameters below as of 10 Aug 2023 13:00  Patient On (Oxygen Delivery Method): room air        I&O's Summary    09 Aug 2023 07:01  -  10 Aug 2023 07:00  --------------------------------------------------------  IN: 480 mL / OUT: 660 mL / NET: -180 mL                              8.0    4.38  )-----------( 171      ( 10 Aug 2023 07:40 )             24.9     08-10    137  |  96  |  56<H>  ----------------------------<  307<H>  4.2   |  23  |  3.72<H>    Ca    10.0      10 Aug 2023 07:38  Phos  3.6     08-10  Mg     2.1     08-10      Tacrolimus (), Serum: 7.2 ng/mL (08-10 @ 07:40)        Culture - Blood (collected 08-08-23 @ 07:49)  Source: .Blood Blood-Peripheral  Preliminary Report (08-10-23 @ 11:01):    No growth at 48 Hours    Culture - Blood (collected 08-08-23 @ 07:49)  Source: .Blood Blood-Peripheral  Preliminary Report (08-10-23 @ 11:01):    No growth at 48 Hours    Culture - Urine (collected 08-07-23 @ 05:21)  Source: Clean Catch Clean Catch (Midstream)  Final Report (08-10-23 @ 12:05):    50,000 - 99,000 CFU/mL Klebsiella pneumoniae  Organism: Klebsiella pneumoniae (08-10-23 @ 12:05)  Organism: Klebsiella pneumoniae (08-10-23 @ 12:05)                  Review of systems  Gen: No weight changes, fatigue, fevers/chills, weakness  Skin: No rashes  Head/Eyes/Ears/Mouth: No headache; Normal hearing; Normal vision w/o blurriness; No sinus pain/discomfort, sore throat  Respiratory: No dyspnea, cough, wheezing, hemoptysis  CV: No chest pain, PND, orthopnea  GI: no abdominal pain, denies diarrhea, constipation, nausea, vomiting, melena, hematochezia  : No increased frequency, dysuria, hematuria, nocturia  MSK: No joint pain/swelling; no back pain; no edema  Neuro: No dizziness/lightheadedness, weakness, seizures, numbness, tingling  Heme: No easy bruising or bleeding  Endo: No heat/cold intolerance  Psych: No significant nervousness, anxiety, stress, depression  All other systems were reviewed and are negative, except as noted.      PHYSICAL EXAM:  Constitutional: Well developed / well nourished  Eyes: Anicteric, PERRLA  ENMT: nc/at  Neck: supple  Respiratory: CTA B/L  Cardiovascular: RRR  Gastrointestinal: Soft, ND/NT.  well healed incisional scar. LIANA 50 serous  Genitourinary: Voiding spontaneously  Extremities: SCD's in place and working bilaterally  Vascular: Palpable dp pulses bilaterally  Neurological: A&O x3  Skin: no rashes, ulcerations or lesions;  Musculoskeletal: Moving all extremities  Psychiatric: Responsive

## 2023-08-10 NOTE — PROGRESS NOTE ADULT - SUBJECTIVE AND OBJECTIVE BOX
[facility] [place] NEPHROLOGY PRACTICE   MD DANIEL WALDEN MD BRIANA PETITO, NP    TEL:  FROM 9 AM to 5 PM ---OFFICE: [phone]  FROM 5 PM - 9 AM PLEASE CALL ANSWERING SERVICE: [phone]     RENAL PROGRESS NOTE: DATE OF SERVICE 08-10-23 @ 12:52  Patient is a 53y old  Male who presents with a chief complaint of UTI, fecal impaction  Patient seen and examined at bedside. No chest pain/sob    VITALS:  T(F): 97.9 (08-10-23 @ 09:00), Max: 98.4 (08-09-23 @ 21:00)  HR: 92 (08-10-23 @ 09:00)  BP: 132/66 (08-10-23 @ 09:00)  RR: 18 (08-10-23 @ 09:00)  SpO2: 100% (08-10-23 @ 09:00)  Wt(kg): --    08-09 @ 07:01  -  08-10 @ 07:00  --------------------------------------------------------  IN: 480 mL / OUT: 660 mL / NET: -180 mL      Height (cm): 175.3 (08-09 @ 14:18)    PHYSICAL EXAM:  Constitutional: NAD  Neck: No JVD  Respiratory: CTAB, no wheezes, rales or rhonchi  Cardiovascular: S1, S2, RRR  Gastrointestinal: BS+, soft, NT/ND  Extremities: No peripheral edema  Access: AVF palpable thrill, bruit auscultated     Hospital Medications:   MEDICATIONS  (STANDING):  atorvastatin 40 milliGRAM(s) Oral at bedtime  bisacodyl 5 milliGRAM(s) Oral at bedtime  dextrose 5%. 1000 milliLiter(s) (50 mL/Hr) IV Continuous <Continuous>  dextrose 5%. 1000 milliLiter(s) (100 mL/Hr) IV Continuous <Continuous>  dextrose 50% Injectable 12.5 Gram(s) IV Push once  dextrose 50% Injectable 25 Gram(s) IV Push once  dextrose 50% Injectable 25 Gram(s) IV Push once  famotidine    Tablet 20 milliGRAM(s) Oral daily  furosemide    Tablet 80 milliGRAM(s) Oral two times a day  glucagon  Injectable 1 milliGRAM(s) IntraMuscular once  insulin glargine Injectable (LANTUS) 20 Unit(s) SubCutaneous at bedtime  insulin lispro (ADMELOG) corrective regimen sliding scale   SubCutaneous three times a day before meals  insulin lispro (ADMELOG) corrective regimen sliding scale   SubCutaneous at bedtime  insulin lispro Injectable (ADMELOG) 8 Unit(s) SubCutaneous three times a day before meals  mycophenolate mofetil 1000 milliGRAM(s) Oral two times a day  nystatin    Suspension 546143 Unit(s) Oral four times a day  polyethylene glycol 3350 17 Gram(s) Oral two times a day  senna 2 Tablet(s) Oral at bedtime  sevelamer carbonate 800 milliGRAM(s) Oral three times a day with meals  tacrolimus ER Tablet (ENVARSUS XR) 4 milliGRAM(s) Oral <User Schedule>  trimethoprim   80 mG/sulfamethoxazole 400 mG 1 Tablet(s) Oral daily  valGANciclovir 450 milliGRAM(s) Oral <User Schedule>    Tacrolimus (), Serum: 7.2 ng/mL (08-10 @ 07:40)    LABS:  08-10    137  |  96  |  56<H>  ----------------------------<  307<H>  4.2   |  23  |  3.72<H>    Ca    10.0      10 Aug 2023 07:38  Phos  3.6     08-10  Mg     2.1     08-10      Creatinine Trend: 3.72 <--, 3.45 <--, 3.43 <--, 5.01 <--, 4.54 <--    Phosphorus: 3.6 mg/dL (08-10 @ 07:38)                              8.0    4.38  )-----------( 171      ( 10 Aug 2023 07:40 )             24.9     Urine Studies:  Urinalysis - [08-10-23 @ 07:38]      Color  / Appearance  / SG  / pH       Gluc 307 / Ketone   / Bili  / Urobili        Blood  / Protein  / Leuk Est  / Nitrite       RBC  / WBC  / Hyaline  / Gran  / Sq Epi  / Non Sq Epi  / Bacteria         HBsAb 5.7      [07-15-23 @ 21:22]  HBsAg Nonreact      [07-15-23 @ 21:21]  HBcAb Nonreact      [07-15-23 @ 21:22]  HCV 0.16, Nonreact      [07-15-23 @ 21:22]  HIV Nonreact      [07-15-23 @ 21:21]      RADIOLOGY & ADDITIONAL STUDIES:

## 2023-08-10 NOTE — CONSULT NOTE ADULT - ASSESSMENT
Tee Salvador is a 52 y/o M with past medical history significant for HTN, CHF (s/p AICD ), CAD (s/p CABG ), IDDM, PVD s/p stent x4 in RLE with R big toe/second toe amputation () and ESRD on HD via LUE AVF for 6 years now s/p DDRT 2023 with Thymoglobulin induction. Post-transplant course complicated by severe constipation requiring disimpaction, DGF requiring hemodialysis and LUE swelling with concern for L subclavian thrombus, now s/p IR fistulogram 23 with balloon angioplasty of subclavian vein. He was discharged home on 23 and remained on HD MWF at home center [facility]. He presents from home via EMS to [facility] on 23 with abdominal discomfort and no bowel movement for 3 days.   Patient reports that he has had no bowel movement, reports this is "just like what happened last time". Reports he is straining but has "no abdominal muscles" to have BM. Denies nausea/vomiting. Reports reduced PO intake and appetite. Denies fevers, chills. Reports increasing urine output up to 500ml/day, still with LIANA in place from transplant surgery. Reports his last HD was on 23 with 1.75L removed.      Noted to have asymptomatic bacteruria this hospitalization, denies weak stream, incomplete emptying of bladder, dysuria, frequency, urgency or hematuria.       #Abdominal Pain  #UTI  # Donor Renal Transplant Recipient  #Immunosuppression due to drug therapy  --Afebrile - curve temperatures  --No leukocytosis - monitor cbc  --BCX () - No growth at 48 hours  --CT A/P () Left transplant kidney with mild hydronephrosis. A stent extends from the kidney to the bladder. There is a small amount of ill-defined high density fluid likely perinephric hemorrhage. Small focus of subcutaneous hemorrhage adjacent to the site of the left percutaneous catheter. Small amount of dependent air within the urinary bladder. Additional foci of air are scattered throughout the bladder may be within the bladder wall. Question infection.  --Continue Ceftriaxone 1 gm Q24HR  --UCX () - +Klebsiella Pneumonia - f/u sensitivities      Tee Salvador is a 54 y/o M with past medical history significant for HTN, CHF (s/p AICD ), CAD (s/p CABG ), IDDM, PVD s/p stent x4 in RLE with R big toe/second toe amputation () and ESRD on HD via LUE AVF for 6 years now s/p DDRT 2023 with Thymoglobulin induction. Post-transplant course complicated by severe constipation requiring disimpaction, DGF requiring hemodialysis and LUE swelling with concern for L subclavian thrombus, now s/p IR fistulogram 23 with balloon angioplasty of subclavian vein. He was discharged home on 23 and remained on HD MWF at home center [facility]. He presents from home via EMS to [facility] on 23 with abdominal discomfort and no bowel movement for 3 days.   Patient reports that he has had no bowel movement, reports this is "just like what happened last time". Reports he is straining but has "no abdominal muscles" to have BM. Denies nausea/vomiting. Reports reduced PO intake and appetite. Denies fevers, chills. Reports increasing urine output up to 500ml/day, still with LIANA in place from transplant surgery. Reports his last HD was on 23 with 1.75L removed.      Noted to have asymptomatic bacteruria this hospitalization, denies weak stream, incomplete emptying of bladder, dysuria, frequency, urgency or hematuria.     #Abdominal Pain  #UTI  # Donor Renal Transplant Recipient  #Immunosuppression due to drug therapy  --Afebrile - curve temperatures  --No leukocytosis - monitor cbc  --BCX () - No growth at 48 hours  --CT A/P () Left transplant kidney with mild hydronephrosis. A stent extends from the kidney to the bladder. There is a small amount of ill-defined high density fluid likely perinephric hemorrhage. Small focus of subcutaneous hemorrhage adjacent to the site of the left percutaneous catheter. Small amount of dependent air within the urinary bladder. Additional foci of air are scattered throughout the bladder may be within the bladder wall. Question infection.  --UCX () - +Klebsiella Pneumonia  --Based on susceptibilities - recommend deescalating to Cefazolin 1g IV Q8H (can use Cephalexin 500 Q8H for discharge purposes) (End Date: 23) (I have ordered)     I will sign off at this time. Please feel free to contact me with any further questions or concerns.    Murali Valentin M.D.  [facility] Division of Infectious Disease  8AM-5PM Monday - Friday: Available on [employer] Teams  After Hours and Holidays (or if no response on [employer] Teams): Please contact the Infectious Diseases Office at [phone]

## 2023-08-10 NOTE — PROGRESS NOTE ADULT - ASSESSMENT
52 y/o M with past medical history significant for HTN, CHF (s/p AICD 2016), CAD (s/p CABG 2015), IDDM, PVD s/p stent x4 in RLE with R big toe/second toe amputation (2021) and ESRD on HD via LUE AVF for 6 years now s/p DDRT 7/16/2023 with Thymoglobulin induction.   Post-transplant course complicated by severe constipation requiring disimpaction, DGF requiring hemodialysis and LUE swelling with concern for L subclavian thrombus, now s/p IR fistulogram 7/7/23 with balloon angioplasty of subclavian vein.   discharged home on 7/27/23 and remained on HD MWF   He presents from home via EMS to [facility] on 8/7/23 with abdominal discomfort and no bowel movement for 3 days.     UTI /ro  f/u cultures  abs   - CT A/P in ED with distended bladder, mild hydronephrosis  renal transpalnt sono  renal biopsy  - Samuels     Constipation  - Continue daily bowel regimen  - Enema PRN   - Ambulate OOB  will likely need disimpaction    #S/P DDRT 7/16/23 with persistent DGF   c/w meds as per renal transplant team     #Hx CAD, CABG, s/p AICD  - Continue meds    #Hx IDDM now on insulin   - Continue home Lantus 23u qHS, Lispro 6u premeal  - POCT glucose ACHS   - CC renal diet   /dm diet

## 2023-08-10 NOTE — PROGRESS NOTE ADULT - SUBJECTIVE AND OBJECTIVE BOX
Subjective: Patient seen and examined. No new events except as noted.     SUBJECTIVE/ROS:        MEDICATIONS:  MEDICATIONS  (STANDING):  atorvastatin 40 milliGRAM(s) Oral at bedtime  bisacodyl 5 milliGRAM(s) Oral at bedtime  cefTRIAXone   IVPB 1000 milliGRAM(s) IV Intermittent every 24 hours  dextrose 5%. 1000 milliLiter(s) (50 mL/Hr) IV Continuous <Continuous>  dextrose 5%. 1000 milliLiter(s) (100 mL/Hr) IV Continuous <Continuous>  dextrose 50% Injectable 25 Gram(s) IV Push once  dextrose 50% Injectable 12.5 Gram(s) IV Push once  dextrose 50% Injectable 25 Gram(s) IV Push once  famotidine    Tablet 20 milliGRAM(s) Oral daily  furosemide    Tablet 80 milliGRAM(s) Oral two times a day  glucagon  Injectable 1 milliGRAM(s) IntraMuscular once  insulin glargine Injectable (LANTUS) 20 Unit(s) SubCutaneous at bedtime  insulin lispro (ADMELOG) corrective regimen sliding scale   SubCutaneous three times a day before meals  insulin lispro (ADMELOG) corrective regimen sliding scale   SubCutaneous at bedtime  insulin lispro Injectable (ADMELOG) 8 Unit(s) SubCutaneous three times a day before meals  methylPREDNISolone sodium succinate IVPB 250 milliGRAM(s) IV Intermittent once  mycophenolate mofetil 1000 milliGRAM(s) Oral two times a day  nystatin    Suspension 586152 Unit(s) Oral four times a day  polyethylene glycol 3350 17 Gram(s) Oral two times a day  senna 2 Tablet(s) Oral at bedtime  sevelamer carbonate 800 milliGRAM(s) Oral three times a day with meals  tacrolimus ER Tablet (ENVARSUS XR) 4 milliGRAM(s) Oral <User Schedule>  trimethoprim   80 mG/sulfamethoxazole 400 mG 1 Tablet(s) Oral daily  valGANciclovir 450 milliGRAM(s) Oral <User Schedule>      PHYSICAL EXAM:  T(C): 36.7 (08-10-23 @ 05:29), Max: 36.9 (08-09-23 @ 21:00)  HR: 96 (08-10-23 @ 05:29) (80 - 98)  BP: 134/69 (08-10-23 @ 05:29) (117/64 - 142/79)  RR: 18 (08-10-23 @ 05:29) (15 - 18)  SpO2: 98% (08-10-23 @ 05:29) (97% - 100%)  Wt(kg): --  I&O's Summary    09 Aug 2023 07:01  -  10 Aug 2023 07:00  --------------------------------------------------------  IN: 480 mL / OUT: 660 mL / NET: -180 mL      Height (cm): 175.3 (08-09 @ 14:18)      JVP: Normal  Neck: supple  Lung: clear   CV: S1 S2 , Murmur:  Abd: soft  Ext: No edema  neuro: Awake / alert  Psych: flat affect  Skin: normal``    LABS/DATA:    CARDIAC MARKERS:                                8.0    4.38  )-----------( 171      ( 10 Aug 2023 07:40 )             24.9     08-10    137  |  96  |  56<H>  ----------------------------<  307<H>  4.2   |  23  |  3.72<H>    Ca    10.0      10 Aug 2023 07:38  Phos  3.6     08-10  Mg     2.1     08-10      proBNP:   Lipid Profile:   HgA1c:   TSH:     TELE:  EKG:

## 2023-08-10 NOTE — CONSULT NOTE ADULT - NS ATTEND AMEND GEN_ALL_CORE FT
pending transplant biopsy  HD per Transplant team
Renal transplant recipient presenting with chief complain of constipation  Noted to have DGF  s/p renal biopsy  UA with pyuria and UCx with Klebsiella  Unclear if UTI or asymptomatic bacteriuria but in context of transplant within 1 month will treat    Today is day 4 of Ceftriaxone  Would deescalate to Cefazolin 1g IV Q8H based on susceptibilities  Can use Cephalexin if planned for discharge soon  Would complete total 7 days of antibiotics (End Date: 8/13/23)    I will sign off at this time. Please feel free to contact me with any further questions or concerns.    Murali Valentin M.D.  [facility] Division of Infectious Disease  8AM-5PM Monday - Friday: Available on [employer] Teams  After Hours and Holidays (or if no response on [employer] Teams): Please contact the Infectious Diseases Office at [phone]    The above assessment and plan were discussed with Chu transplant surgery NP

## 2023-08-10 NOTE — PROGRESS NOTE ADULT - PROBLEM SELECTOR PLAN 1
Pt. with h/o ESRD on HD. Underwent DDRT on 7/16/23. Post-transplant course significant for DGF. Still receiving iHD at [facility], last outpatient HD was 8/4/23. HD Consent obtained from patient and placed in chart. Pt. is non-oliguric. Pt. clinically stable. Labs reviewed. HD done Mon 8/7.     Continue with immunosuppression regimen, as outlined below. Monitor labs and urine output. Avoid nephrotoxins. Dose medications as per eGFR. Planed for renal biopsy on Wednesday.      on ABX for klebsiella UTI, sensitivities to be reported. Currently on Ceft 8/6-

## 2023-08-10 NOTE — PROGRESS NOTE ADULT - PROBLEM SELECTOR PLAN 2
Current home immunosuppression regimen is Envarsus 8 mg qd, Cellcept 1 gm BID, and prednisone 5 mg qd. Tacro trough noted 6.9 yesterday. Now on Envarsus 4mg and cellcept 1g BID. Check tacrolimus trough 30 minutes prior to AM dose, goal: 8-10. Pred held while patient on pulse dose steroids 8/8-

## 2023-08-10 NOTE — PROVIDER CONTACT NOTE (CRITICAL VALUE NOTIFICATION) - ACTION/TREATMENT ORDERED:
Provider notified, pt already receiving ceftriaxone, no further interventions given at this time. Nursing care continues
SAEID Sage notified, no interventions at this time, pt safety is maintained

## 2023-08-10 NOTE — PROGRESS NOTE ADULT - ASSESSMENT
54 yo M with h/o ESRD on HD x6 years via LUE AVF (outpatient nephrologist: Dr. Shaka Sanchez), underwent DDR on 7/16/23, DM2 CAD, CHF, underwent CABG 2015, AICD (2016), and severe PVD (underwent R big toe and second toe amputation in 2021) here for abdominal pain and constipation also with DGF requiring iHD, renal biopsy done 8/9/23.

## 2023-08-10 NOTE — PROGRESS NOTE ADULT - SUBJECTIVE AND OBJECTIVE BOX
[facility] Division of Kidney Diseases & Hypertension  FOLLOW UP NOTE  [phone]--------------------------------------------------------------------------------    Chief Complaint: history of DDRT on 7/16/23    HPI:   52 yo M with h/o ESRD on HD x6 years via LUE AVF (outpatient nephrologist: Dr. Shaka Sanchez), underwent DDRT on 7/16/23, DM2 CAD, CHF, underwent CABG 2015, AICD (2016), and severe PVD (underwent R big toe and second toe amputation in 2021) here for abdominal pain and constipation. Patient had a DGF post op and is still continuing to require iHD. Goes to [facility]. Last iHD was Friday, Aug 4th. Patient was planned to undergo a renal biopsy this week but had to be brought in by EMS for abdominal discomfort and constipation. Patient also with reduced intake of PO. Still makes urine.     24 hour events/subjective:  Patient underwent renal biopsy yesterday. Denies any headaches, fevers/chills, chest pain, palpitations, SOB, and leg swelling.      PAST HISTORY  --------------------------------------------------------------------------------  No significant changes to PMH, PSH, FHx, SHx, unless otherwise noted    ALLERGIES & MEDICATIONS  --------------------------------------------------------------------------------  Allergies    Contrast. (Unknown)    Intolerances      Standing Inpatient Medications  atorvastatin 40 milliGRAM(s) Oral at bedtime  bisacodyl 5 milliGRAM(s) Oral at bedtime  cefTRIAXone   IVPB 1000 milliGRAM(s) IV Intermittent every 24 hours  dextrose 5%. 1000 milliLiter(s) IV Continuous <Continuous>  dextrose 5%. 1000 milliLiter(s) IV Continuous <Continuous>  dextrose 50% Injectable 25 Gram(s) IV Push once  dextrose 50% Injectable 12.5 Gram(s) IV Push once  dextrose 50% Injectable 25 Gram(s) IV Push once  famotidine    Tablet 20 milliGRAM(s) Oral daily  furosemide    Tablet 80 milliGRAM(s) Oral two times a day  glucagon  Injectable 1 milliGRAM(s) IntraMuscular once  insulin glargine Injectable (LANTUS) 20 Unit(s) SubCutaneous at bedtime  insulin lispro (ADMELOG) corrective regimen sliding scale   SubCutaneous at bedtime  insulin lispro (ADMELOG) corrective regimen sliding scale   SubCutaneous three times a day before meals  insulin lispro Injectable (ADMELOG) 8 Unit(s) SubCutaneous three times a day before meals  methylPREDNISolone sodium succinate IVPB 250 milliGRAM(s) IV Intermittent once  mycophenolate mofetil 1000 milliGRAM(s) Oral two times a day  nystatin    Suspension 651682 Unit(s) Oral four times a day  polyethylene glycol 3350 17 Gram(s) Oral two times a day  senna 2 Tablet(s) Oral at bedtime  sevelamer carbonate 800 milliGRAM(s) Oral three times a day with meals  tacrolimus ER Tablet (ENVARSUS XR) 4 milliGRAM(s) Oral <User Schedule>  trimethoprim   80 mG/sulfamethoxazole 400 mG 1 Tablet(s) Oral daily  valGANciclovir 450 milliGRAM(s) Oral <User Schedule>    PRN Inpatient Medications  dextrose Oral Gel 15 Gram(s) Oral once PRN      REVIEW OF SYSTEMS  --------------------------------------------------------------------------------  per above     VITALS/PHYSICAL EXAM  --------------------------------------------------------------------------------  T(C): 36.7 (08-10-23 @ 05:29), Max: 36.9 (08-09-23 @ 21:00)  HR: 96 (08-10-23 @ 05:29) (80 - 98)  BP: 134/69 (08-10-23 @ 05:29) (117/64 - 142/79)  RR: 18 (08-10-23 @ 05:29) (15 - 18)  SpO2: 98% (08-10-23 @ 05:29) (97% - 100%)  Wt(kg): --  Height (cm): 175.3 (08-09-23 @ 14:18)      08-09-23 @ 07:01  -  08-10-23 @ 07:00  --------------------------------------------------------  IN: 360 mL / OUT: 640 mL / NET: -280 mL      Physical Exam:  Gen: NAD  HEENT: PERRL, supple neck, clear oropharynx  Pulm: CTA B/L  CV: RRR, S1S2;  Abd: +BS, soft, nontender/nondistended  Transplant:   : No suprapubic tenderness  Extremities: No LE edema  Skin: Warm, without rashes  Vascular access: +LUE AVF with thrill and bruit appreciated    LABS/STUDIES  --------------------------------------------------------------------------------              8.4    5.26  >-----------<  178      [08-09-23 @ 06:41]              26.4     134  |  96  |  43  ----------------------------<  252      [08-09-23 @ 06:41]  4.1   |  22  |  3.45        Ca     10.1     [08-09-23 @ 06:41]      Mg     2.1     [08-09-23 @ 06:41]      Phos  3.4     [08-09-23 @ 06:41]            Creatinine Trend:  SCr 3.45 [08-09 @ 06:41]  SCr 3.43 [08-08 @ 07:42]  SCr 5.01 [08-07 @ 06:58]  SCr 4.54 [08-07 @ 05:13]  SCr 5.66 [07-28 @ 10:57]    Urinalysis - [08-09-23 @ 06:41]      Color  / Appearance  / SG  / pH       Gluc 252 / Ketone   / Bili  / Urobili        Blood  / Protein  / Leuk Est  / Nitrite       RBC  / WBC  / Hyaline  / Gran  / Sq Epi  / Non Sq Epi  / Bacteria

## 2023-08-10 NOTE — PROGRESS NOTE ADULT - ASSESSMENT
Tee Salvador is a 52 y/o M with past medical history significant for HTN, CHF (s/p AICD 2016--last interrogated 7/15/23), CAD (s/p CABG 2015), IDDM, PVD s/p stent x4 in RLE with R big toe/second toe amputation (2021) and ESRD on HD via LUE AVF for 6 years now s/p DDRT 7/16/2023 with Thymoglobulin induction. Post-transplant course complicated by severe constipation requiring disimpaction, DGF requiring hemodialysis and LUE swelling with concern for L subclavian thrombus, now s/p IR fistulogram 7/7/23 with balloon angioplasty of subclavian vein. He was discharged home on 7/27/23 and remained on HD MWF at home center [facility]. He presents from home via EMS to [facility] on 8/7/23 with constipation and continued DGF    DGF s/p DDRT  -S/P IR biopsy yesterday pending results  -DSA+ : will continue pulse dose steroids (500 on 8/9, 250 yesterday and today)  -HD per Nephrology, remains on Lasix 80BID  -UTI: UCx with GNR 50-99K, on CTX, f/u specation  -f/u BCx  -expect LIANA removal this admission  -bowel regimen  -diet as tolerated  -SCDs  -Resume asa in am (48 hours post BX)    Immunosuppression  -Envarsus per level  -MMF 1/1  -steroids as above  -PPx regimen: Valcyte/Bactrim/Nystatin/PPI with renal dosing    DM  -adjust while on steroids

## 2023-08-10 NOTE — PROGRESS NOTE ADULT - SUBJECTIVE AND OBJECTIVE BOX
Patient is a 53y old  Male who presents with a chief complaint of UTI, fecal impaction (10 Aug 2023 13:26)      DATE OF SERVICE: 08-10-23 @ 13:34    SUBJECTIVE / OVERNIGHT EVENTS: overnight events noted    ROS:  Resp: No cough no sputum production  CVS: No chest pain no palpitations no orthopnea  GI: no N/V/D  : no dysuria, no hematuria        MEDICATIONS  (STANDING):  atorvastatin 40 milliGRAM(s) Oral at bedtime  bisacodyl 5 milliGRAM(s) Oral at bedtime  dextrose 5%. 1000 milliLiter(s) (50 mL/Hr) IV Continuous <Continuous>  dextrose 5%. 1000 milliLiter(s) (100 mL/Hr) IV Continuous <Continuous>  dextrose 50% Injectable 12.5 Gram(s) IV Push once  dextrose 50% Injectable 25 Gram(s) IV Push once  dextrose 50% Injectable 25 Gram(s) IV Push once  famotidine    Tablet 20 milliGRAM(s) Oral daily  furosemide    Tablet 80 milliGRAM(s) Oral two times a day  glucagon  Injectable 1 milliGRAM(s) IntraMuscular once  insulin glargine Injectable (LANTUS) 20 Unit(s) SubCutaneous at bedtime  insulin lispro (ADMELOG) corrective regimen sliding scale   SubCutaneous three times a day before meals  insulin lispro (ADMELOG) corrective regimen sliding scale   SubCutaneous at bedtime  insulin lispro Injectable (ADMELOG) 8 Unit(s) SubCutaneous three times a day before meals  mycophenolate mofetil 1000 milliGRAM(s) Oral two times a day  nystatin    Suspension 576898 Unit(s) Oral four times a day  polyethylene glycol 3350 17 Gram(s) Oral two times a day  senna 2 Tablet(s) Oral at bedtime  sevelamer carbonate 800 milliGRAM(s) Oral three times a day with meals  tacrolimus ER Tablet (ENVARSUS XR) 4 milliGRAM(s) Oral <User Schedule>  trimethoprim   80 mG/sulfamethoxazole 400 mG 1 Tablet(s) Oral daily  valGANciclovir 450 milliGRAM(s) Oral <User Schedule>    MEDICATIONS  (PRN):  dextrose Oral Gel 15 Gram(s) Oral once PRN Blood Glucose LESS THAN 70 milliGRAM(s)/deciliter        CAPILLARY BLOOD GLUCOSE      POCT Blood Glucose.: 281 mg/dL (10 Aug 2023 12:35)  POCT Blood Glucose.: 304 mg/dL (10 Aug 2023 08:29)  POCT Blood Glucose.: 266 mg/dL (09 Aug 2023 21:23)  POCT Blood Glucose.: 282 mg/dL (09 Aug 2023 16:43)    I&O's Summary    09 Aug 2023 07:01  -  10 Aug 2023 07:00  --------------------------------------------------------  IN: 480 mL / OUT: 660 mL / NET: -180 mL        Vital Signs Last 24 Hrs  T(C): 36.1 (10 Aug 2023 13:00), Max: 36.9 (09 Aug 2023 21:00)  T(F): 97 (10 Aug 2023 13:00), Max: 98.4 (09 Aug 2023 21:00)  HR: 93 (10 Aug 2023 13:00) (80 - 96)  BP: 135/67 (10 Aug 2023 13:00) (117/64 - 135/67)  BP(mean): 65 (09 Aug 2023 16:05) (65 - 100)  RR: 18 (10 Aug 2023 13:00) (15 - 18)  SpO2: 99% (10 Aug 2023 13:00) (97% - 100%)      PHYSICAL EXAM:  EYES: EOMI, PERRLA  CHEST/LUNG: clear   HEART: S1 S2; no murmurs   ABDOMEN: Soft, Nontender  LIANA drain +  EXTREMITIES:  no edema  NEUROLOGY: AO x 3 non-focal  SKIN: No rashes or lesions    LABS:                        8.0    4.38  )-----------( 171      ( 10 Aug 2023 07:40 )             24.9     08-10    137  |  96  |  56<H>  ----------------------------<  307<H>  4.2   |  23  |  3.72<H>    Ca    10.0      10 Aug 2023 07:38  Phos  3.6     08-10  Mg     2.1     08-10            Urinalysis Basic - ( 10 Aug 2023 07:38 )    Color: x / Appearance: x / SG: x / pH: x  Gluc: 307 mg/dL / Ketone: x  / Bili: x / Urobili: x   Blood: x / Protein: x / Nitrite: x   Leuk Esterase: x / RBC: x / WBC x   Sq Epi: x / Non Sq Epi: x / Bacteria: x          All consultant(s) notes reviewed and care discussed with other providers        Contact Number, Dr Murphy [phone]

## 2023-08-10 NOTE — PROGRESS NOTE ADULT - PROBLEM SELECTOR PLAN 4
Phos is at goal for now. C/w home Sevelamer 800 mg tid. Recommend to continue with phosphorus binder. Low phosphorus diet. Monitor serum phosphorus, goal: 3.5-5.5    If you have any questions, please feel free to contact me  Shar Schaefer  Nephrology Fellow  [phone]; Prefer [employer] TEAMS  (After 5pm or on weekends please page the on-call fellow).

## 2023-08-10 NOTE — PROVIDER CONTACT NOTE (CRITICAL VALUE NOTIFICATION) - ASSESSMENT
Pt A&O4, VSS, asymptomatic, pt offers no complaints at this time
Pt A&O4, VSS, no complaints with urination at this time

## 2023-08-10 NOTE — PROGRESS NOTE ADULT - ASSESSMENT
54 yo M with hx of DM2 CAD, CHF, s/p CABG 2015, AICD (2016), on hemodialysis for approximately 6 years via L AVF (nephrologist Dr. Bharath Romo), He has hx of PVD, is s/p 4 stents in R leg (mid and distal right superficial femoral artery, right popliteal x2 on 1/14/22).; is sp RT big toe and second toe amputation 2021, on asa and plavix for severe PVD (per pt). Now here for possible DDRT. Pt reports does not make urine only few drops occasionally. Pt denies N/V/D, fevers/chills, CP, SOB. Pt reports last ate at 4pm today and had HD yesterday 7/14/23.  Nephrology consulted for ESRD s/p DDRT.      s/p DDRT @ [facility] 7/16/2023   Initially received Simulect induction -- Changed to Thymoglobulin given delayed graft function.  Pt discharged on previous admission on HD.   Post transport course significant for DGF  Via L AVF --> s/p Left Radiocephalic fistulogram and subclavian vein angioplasty with Vascular 7/24/2023  Consent obtained from patient and placed in chart.  s/p CT A& P No contrast 8/7/23 --> partially imaged L pleural effusion, unchanged. Partially  imaged LLL opacity again seen.  small amount of dependent air within urinary bladder, small renal cysts, left transplant kidney with mil hydro. Stent extends from kidney to bladder.   s/p Doppler Renal US Transplant kidney 8/7/23 --> patent renal vasculature. Elevated renal artery peak systolic velocities. Concern for possible renal artery stenosis at anastomosis. Mild elevation  of intrarenal resistive indices , grossly unchanged from prior exam.   Continue Immunosuppression per transplant : Envarsus 4 mg qd, Cellcept 1 gm BID, and prednisone 5 mg qd.   Tacro trough is 7.2 8/10  s/p Transplant renal biopsy 8/09  Monitor labs and urine output. Avoid nephrotoxins  HD per transplant, last HD 8/7    Dose medications as per eGFR.  Renal Diet   Monitor     Anemia  Mgmt per Transplant - planning for LUX with HD  Iron panel pending    Maintain Hgb > 8  Monitor     HTN:   BP optimal     Monitor     Proteinuria/Hemturia  likely in setting of + LE, bacteria  Urine cx + Klebsiella   Mgmt per transplant

## 2023-08-11 ENCOUNTER — APPOINTMENT (OUTPATIENT)
Dept: ULTRASOUND IMAGING | Facility: HOSPITAL | Age: 53
End: 2023-08-11

## 2023-08-11 LAB
ANION GAP SERPL CALC-SCNC: 15 MMOL/L — SIGNIFICANT CHANGE UP (ref 5–17)
APTT BLD: 24.8 SEC — SIGNIFICANT CHANGE UP (ref 24.5–35.6)
BASOPHILS # BLD AUTO: 0 K/UL — SIGNIFICANT CHANGE UP (ref 0–0.2)
BASOPHILS NFR BLD AUTO: 0 % — SIGNIFICANT CHANGE UP (ref 0–2)
BLD GP AB SCN SERPL QL: NEGATIVE — SIGNIFICANT CHANGE UP
BUN SERPL-MCNC: 61 MG/DL — HIGH (ref 7–23)
CA-I BLD-SCNC: 1.33 MMOL/L — SIGNIFICANT CHANGE UP (ref 1.15–1.33)
CALCIUM SERPL-MCNC: 10 MG/DL — SIGNIFICANT CHANGE UP (ref 8.4–10.5)
CHLORIDE SERPL-SCNC: 96 MMOL/L — SIGNIFICANT CHANGE UP (ref 96–108)
CO2 SERPL-SCNC: 23 MMOL/L — SIGNIFICANT CHANGE UP (ref 22–31)
CREAT SERPL-MCNC: 3.35 MG/DL — HIGH (ref 0.5–1.3)
EGFR: 21 ML/MIN/1.73M2 — LOW
EOSINOPHIL # BLD AUTO: 0 K/UL — SIGNIFICANT CHANGE UP (ref 0–0.5)
EOSINOPHIL NFR BLD AUTO: 0 % — SIGNIFICANT CHANGE UP (ref 0–6)
FIBRINOGEN PPP-MCNC: 362 MG/DL — SIGNIFICANT CHANGE UP (ref 200–445)
GLUCOSE BLDC GLUCOMTR-MCNC: 203 MG/DL — HIGH (ref 70–99)
GLUCOSE BLDC GLUCOMTR-MCNC: 206 MG/DL — HIGH (ref 70–99)
GLUCOSE BLDC GLUCOMTR-MCNC: 212 MG/DL — HIGH (ref 70–99)
GLUCOSE BLDC GLUCOMTR-MCNC: 222 MG/DL — HIGH (ref 70–99)
GLUCOSE BLDC GLUCOMTR-MCNC: 278 MG/DL — HIGH (ref 70–99)
GLUCOSE SERPL-MCNC: 272 MG/DL — HIGH (ref 70–99)
HCT VFR BLD CALC: 25.3 % — LOW (ref 39–50)
HGB BLD-MCNC: 8.1 G/DL — LOW (ref 13–17)
IMM GRANULOCYTES NFR BLD AUTO: 0.9 % — SIGNIFICANT CHANGE UP (ref 0–0.9)
INR BLD: 0.93 RATIO — SIGNIFICANT CHANGE UP (ref 0.85–1.18)
LYMPHOCYTES # BLD AUTO: 0.05 K/UL — LOW (ref 1–3.3)
LYMPHOCYTES # BLD AUTO: 1.2 % — LOW (ref 13–44)
MAGNESIUM SERPL-MCNC: 2 MG/DL — SIGNIFICANT CHANGE UP (ref 1.6–2.6)
MCHC RBC-ENTMCNC: 31 PG — SIGNIFICANT CHANGE UP (ref 27–34)
MCHC RBC-ENTMCNC: 32 GM/DL — SIGNIFICANT CHANGE UP (ref 32–36)
MCV RBC AUTO: 96.9 FL — SIGNIFICANT CHANGE UP (ref 80–100)
MONOCYTES # BLD AUTO: 0.33 K/UL — SIGNIFICANT CHANGE UP (ref 0–0.9)
MONOCYTES NFR BLD AUTO: 7.6 % — SIGNIFICANT CHANGE UP (ref 2–14)
NEUTROPHILS # BLD AUTO: 3.92 K/UL — SIGNIFICANT CHANGE UP (ref 1.8–7.4)
NEUTROPHILS NFR BLD AUTO: 90.3 % — HIGH (ref 43–77)
NRBC # BLD: 0 /100 WBCS — SIGNIFICANT CHANGE UP (ref 0–0)
PHOSPHATE SERPL-MCNC: 2.8 MG/DL — SIGNIFICANT CHANGE UP (ref 2.5–4.5)
PLATELET # BLD AUTO: 197 K/UL — SIGNIFICANT CHANGE UP (ref 150–400)
POTASSIUM SERPL-MCNC: 4.2 MMOL/L — SIGNIFICANT CHANGE UP (ref 3.5–5.3)
POTASSIUM SERPL-SCNC: 4.2 MMOL/L — SIGNIFICANT CHANGE UP (ref 3.5–5.3)
PROTHROM AB SERPL-ACNC: 10.3 SEC — SIGNIFICANT CHANGE UP (ref 9.5–13)
RBC # BLD: 2.61 M/UL — LOW (ref 4.2–5.8)
RBC # FLD: 18 % — HIGH (ref 10.3–14.5)
RH IG SCN BLD-IMP: POSITIVE — SIGNIFICANT CHANGE UP
SODIUM SERPL-SCNC: 134 MMOL/L — LOW (ref 135–145)
TACROLIMUS SERPL-MCNC: 6.2 NG/ML — SIGNIFICANT CHANGE UP
WBC # BLD: 4.34 K/UL — SIGNIFICANT CHANGE UP (ref 3.8–10.5)
WBC # FLD AUTO: 4.34 K/UL — SIGNIFICANT CHANGE UP (ref 3.8–10.5)

## 2023-08-11 PROCEDURE — 99232 SBSQ HOSP IP/OBS MODERATE 35: CPT | Mod: 24

## 2023-08-11 PROCEDURE — 36514 APHERESIS PLASMA: CPT

## 2023-08-11 PROCEDURE — 99233 SBSQ HOSP IP/OBS HIGH 50: CPT | Mod: GC

## 2023-08-11 RX ORDER — ACETAMINOPHEN 500 MG
650 TABLET ORAL ONCE
Refills: 0 | Status: COMPLETED | OUTPATIENT
Start: 2023-08-11 | End: 2023-08-11

## 2023-08-11 RX ORDER — IMMUNE GLOBULIN (HUMAN) 10 G/100ML
9 INJECTION INTRAVENOUS; SUBCUTANEOUS ONCE
Refills: 0 | Status: COMPLETED | OUTPATIENT
Start: 2023-08-11 | End: 2023-08-11

## 2023-08-11 RX ORDER — INSULIN GLARGINE 100 [IU]/ML
27 INJECTION, SOLUTION SUBCUTANEOUS AT BEDTIME
Refills: 0 | Status: DISCONTINUED | OUTPATIENT
Start: 2023-08-11 | End: 2023-08-13

## 2023-08-11 RX ORDER — DIPHENHYDRAMINE HCL 50 MG
25 CAPSULE ORAL ONCE
Refills: 0 | Status: COMPLETED | OUTPATIENT
Start: 2023-08-11 | End: 2023-08-11

## 2023-08-11 RX ORDER — ERYTHROPOIETIN 10000 [IU]/ML
10000 INJECTION, SOLUTION INTRAVENOUS; SUBCUTANEOUS ONCE
Refills: 0 | Status: COMPLETED | OUTPATIENT
Start: 2023-08-11 | End: 2023-08-11

## 2023-08-11 RX ORDER — ALBUMIN HUMAN 25 %
500 VIAL (ML) INTRAVENOUS ONCE
Refills: 0 | Status: COMPLETED | OUTPATIENT
Start: 2023-08-11 | End: 2023-08-11

## 2023-08-11 RX ORDER — HUMAN INSULIN 100 [IU]/ML
7 INJECTION, SUSPENSION SUBCUTANEOUS ONCE
Refills: 0 | Status: COMPLETED | OUTPATIENT
Start: 2023-08-11 | End: 2023-08-11

## 2023-08-11 RX ADMIN — Medication 25 MILLIGRAM(S): at 21:42

## 2023-08-11 RX ADMIN — FAMOTIDINE 20 MILLIGRAM(S): 10 INJECTION INTRAVENOUS at 11:35

## 2023-08-11 RX ADMIN — Medication 25 MILLIGRAM(S): at 17:47

## 2023-08-11 RX ADMIN — Medication 250 MILLILITER(S): at 18:31

## 2023-08-11 RX ADMIN — Medication 4: at 12:29

## 2023-08-11 RX ADMIN — IMMUNE GLOBULIN (HUMAN) 15 GRAM(S): 10 INJECTION INTRAVENOUS; SUBCUTANEOUS at 21:55

## 2023-08-11 RX ADMIN — Medication 125 MILLIGRAM(S): at 11:35

## 2023-08-11 RX ADMIN — ERYTHROPOIETIN 10000 UNIT(S): 10000 INJECTION, SOLUTION INTRAVENOUS; SUBCUTANEOUS at 11:47

## 2023-08-11 RX ADMIN — Medication 650 MILLIGRAM(S): at 21:42

## 2023-08-11 RX ADMIN — ATORVASTATIN CALCIUM 40 MILLIGRAM(S): 80 TABLET, FILM COATED ORAL at 21:04

## 2023-08-11 RX ADMIN — HUMAN INSULIN 7 UNIT(S): 100 INJECTION, SUSPENSION SUBCUTANEOUS at 12:28

## 2023-08-11 RX ADMIN — Medication 4: at 17:04

## 2023-08-11 RX ADMIN — SEVELAMER CARBONATE 800 MILLIGRAM(S): 2400 POWDER, FOR SUSPENSION ORAL at 08:41

## 2023-08-11 RX ADMIN — TACROLIMUS 4 MILLIGRAM(S): 5 CAPSULE ORAL at 08:43

## 2023-08-11 RX ADMIN — SEVELAMER CARBONATE 800 MILLIGRAM(S): 2400 POWDER, FOR SUSPENSION ORAL at 11:35

## 2023-08-11 RX ADMIN — POLYETHYLENE GLYCOL 3350 17 GRAM(S): 17 POWDER, FOR SOLUTION ORAL at 17:02

## 2023-08-11 RX ADMIN — Medication 100 MILLIGRAM(S): at 17:03

## 2023-08-11 RX ADMIN — Medication 650 MILLIGRAM(S): at 22:42

## 2023-08-11 RX ADMIN — Medication 80 MILLIGRAM(S): at 08:41

## 2023-08-11 RX ADMIN — INSULIN GLARGINE 27 UNIT(S): 100 INJECTION, SOLUTION SUBCUTANEOUS at 22:46

## 2023-08-11 RX ADMIN — Medication 100 MILLIGRAM(S): at 08:41

## 2023-08-11 RX ADMIN — Medication 81 MILLIGRAM(S): at 11:35

## 2023-08-11 RX ADMIN — Medication 500000 UNIT(S): at 08:41

## 2023-08-11 RX ADMIN — POLYETHYLENE GLYCOL 3350 17 GRAM(S): 17 POWDER, FOR SOLUTION ORAL at 08:41

## 2023-08-11 RX ADMIN — Medication 6: at 08:47

## 2023-08-11 RX ADMIN — SEVELAMER CARBONATE 800 MILLIGRAM(S): 2400 POWDER, FOR SUSPENSION ORAL at 17:03

## 2023-08-11 RX ADMIN — MYCOPHENOLATE MOFETIL 1000 MILLIGRAM(S): 250 CAPSULE ORAL at 08:41

## 2023-08-11 RX ADMIN — MYCOPHENOLATE MOFETIL 1000 MILLIGRAM(S): 250 CAPSULE ORAL at 21:04

## 2023-08-11 RX ADMIN — Medication 500000 UNIT(S): at 11:35

## 2023-08-11 RX ADMIN — Medication 80 MILLIGRAM(S): at 13:14

## 2023-08-11 RX ADMIN — Medication 10 UNIT(S): at 12:28

## 2023-08-11 RX ADMIN — Medication 10 UNIT(S): at 17:05

## 2023-08-11 RX ADMIN — VALGANCICLOVIR 450 MILLIGRAM(S): 450 TABLET, FILM COATED ORAL at 08:42

## 2023-08-11 RX ADMIN — Medication 500000 UNIT(S): at 17:02

## 2023-08-11 RX ADMIN — Medication 5 MILLIGRAM(S): at 21:04

## 2023-08-11 RX ADMIN — Medication 1 TABLET(S): at 11:35

## 2023-08-11 RX ADMIN — Medication 10 UNIT(S): at 08:48

## 2023-08-11 NOTE — PROGRESS NOTE ADULT - ASSESSMENT
52 y/o M with past medical history significant for HTN, CHF (s/p AICD 2016), CAD (s/p CABG 2015), IDDM, PVD s/p stent x4 in RLE with R big toe/second toe amputation (2021) and ESRD on HD via LUE AVF for 6 years now s/p DDRT 7/16/2023 with Thymoglobulin induction.   Post-transplant course complicated by severe constipation requiring disimpaction, DGF requiring hemodialysis and LUE swelling with concern for L subclavian thrombus, now s/p IR fistulogram 7/7/23 with balloon angioplasty of subclavian vein.   discharged home on 7/27/23 and remained on HD MWF   He presents from home via EMS to [facility] on 8/7/23 with abdominal discomfort and no bowel movement for 3 days.     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

## 2023-08-11 NOTE — PROGRESS NOTE ADULT - PROBLEM SELECTOR PLAN 4
Phos is at goal for now. C/w home Sevelamer 800 mg tid. Recommend to continue with phosphorus binder. Low phosphorus diet. Monitor serum phosphorus, goal: 3.5-5.5    If you have any questions, please feel free to contact me  Shar Schaefer  Nephrology Fellow  [phone]; Prefer [employer] TEAMS  (After 5pm or on weekends please page the on-call fellow).

## 2023-08-11 NOTE — PROGRESS NOTE ADULT - SUBJECTIVE AND OBJECTIVE BOX
[facility] DIVISION OF KIDNEY DISEASES AND HYPERTENSION -- FOLLOW UP NOTE  --------------------------------------------------------------------------------  Chief Complaint: history of DDRT on 7/16/23    HPI:   52 yo M with h/o ESRD on HD x6 years via LUE AVF (outpatient nephrologist: Dr. Shaka Sanchez), underwent DDRT on 7/16/23, DM2 CAD, CHF, underwent CABG 2015, AICD (2016), and severe PVD (underwent R big toe and second toe amputation in 2021) here for abdominal pain and constipation. Patient had a DGF post op and is still continuing to require iHD. Goes to [facility]. Last iHD was Friday, Aug 4th. Patient was planned to undergo a renal biopsy this week but had to be brought in by EMS for abdominal discomfort and constipation. Patient also with reduced intake of PO. Still makes urine. s/p renal biopsy on 8/9.    24 hour events/subjective:  Renal biopsy showing concern for AMR and diabetic nephrosclerosis. Is upset about the biopsy results. Denies any headaches, fevers/chills, chest pain, palpitations, SOB, and leg swelling.      PAST HISTORY  --------------------------------------------------------------------------------  No significant changes to PMH, PSH, FHx, SHx, unless otherwise noted    ALLERGIES & MEDICATIONS  --------------------------------------------------------------------------------  Allergies    Contrast. (Unknown)    Intolerances      Standing Inpatient Medications  aspirin enteric coated 81 milliGRAM(s) Oral daily  atorvastatin 40 milliGRAM(s) Oral at bedtime  bisacodyl 5 milliGRAM(s) Oral at bedtime  ceFAZolin   IVPB 1000 milliGRAM(s) IV Intermittent every 8 hours  dextrose 5%. 1000 milliLiter(s) IV Continuous <Continuous>  dextrose 5%. 1000 milliLiter(s) IV Continuous <Continuous>  dextrose 50% Injectable 25 Gram(s) IV Push once  dextrose 50% Injectable 12.5 Gram(s) IV Push once  dextrose 50% Injectable 25 Gram(s) IV Push once  famotidine    Tablet 20 milliGRAM(s) Oral daily  furosemide    Tablet 80 milliGRAM(s) Oral two times a day  glucagon  Injectable 1 milliGRAM(s) IntraMuscular once  insulin glargine Injectable (LANTUS) 20 Unit(s) SubCutaneous at bedtime  insulin lispro (ADMELOG) corrective regimen sliding scale   SubCutaneous at bedtime  insulin lispro (ADMELOG) corrective regimen sliding scale   SubCutaneous three times a day before meals  insulin lispro Injectable (ADMELOG) 10 Unit(s) SubCutaneous three times a day before meals  mycophenolate mofetil 1000 milliGRAM(s) Oral two times a day  nystatin    Suspension 420879 Unit(s) Oral four times a day  polyethylene glycol 3350 17 Gram(s) Oral two times a day  senna 2 Tablet(s) Oral at bedtime  sevelamer carbonate 800 milliGRAM(s) Oral three times a day with meals  tacrolimus ER Tablet (ENVARSUS XR) 4 milliGRAM(s) Oral <User Schedule>  trimethoprim   80 mG/sulfamethoxazole 400 mG 1 Tablet(s) Oral daily  valGANciclovir 450 milliGRAM(s) Oral <User Schedule>    PRN Inpatient Medications  dextrose Oral Gel 15 Gram(s) Oral once PRN      REVIEW OF SYSTEMS  --------------------------------------------------------------------------------  per above    VITALS/PHYSICAL EXAM  --------------------------------------------------------------------------------  T(C): 36.9 (08-11-23 @ 06:50), Max: 36.9 (08-10-23 @ 17:00)  HR: 89 (08-11-23 @ 06:50) (80 - 94)  BP: 113/53 (08-11-23 @ 06:50) (107/65 - 135/67)  RR: 18 (08-11-23 @ 06:50) (18 - 18)  SpO2: 99% (08-11-23 @ 06:50) (95% - 100%)  Wt(kg): --  Height (cm): 175.3 (08-09-23 @ 14:18)      08-10-23 @ 07:01  -  08-11-23 @ 07:00  --------------------------------------------------------  IN: 480 mL / OUT: 1705 mL / NET: -1225 mL      Physical Exam:  Gen: NAD  HEENT: PERRL, supple neck, clear oropharynx  Pulm: CTA B/L  CV: RRR, S1S2;  Abd: +BS, soft, nontender/nondistended  : No suprapubic tenderness  Extremities: No LE edema  Skin: Warm, without rashes  Vascular access: +LUE AVF with thrill and bruit appreciated      LABS/STUDIES  --------------------------------------------------------------------------------              8.0    4.38  >-----------<  171      [08-10-23 @ 07:40]              24.9     137  |  96  |  56  ----------------------------<  307      [08-10-23 @ 07:38]  4.2   |  23  |  3.72        Ca     10.0     [08-10-23 @ 07:38]      iCa    1.33     [08-11 @ 07:19]      Mg     2.1     [08-10-23 @ 07:38]      Phos  3.6     [08-10-23 @ 07:38]            Creatinine Trend:  SCr 3.72 [08-10 @ 07:38]  SCr 3.45 [08-09 @ 06:41]  SCr 3.43 [08-08 @ 07:42]  SCr 5.01 [08-07 @ 06:58]  SCr 4.54 [08-07 @ 05:13]    Tacrolimus (), Serum: 7.2 ng/mL (08-10 @ 07:40)  Tacrolimus (), Serum: 6.9 ng/mL (08-09 @ 06:38)  Tacrolimus (), Serum: 13.0 ng/mL (08-08 @ 07:42)  Tacrolimus (), Serum: 12.3 ng/mL (08-07 @ 05:13)            Urinalysis - [08-10-23 @ 07:38]      Color  / Appearance  / SG  / pH       Gluc 307 / Ketone   / Bili  / Urobili        Blood  / Protein  / Leuk Est  / Nitrite       RBC  / WBC  / Hyaline  / Gran  / Sq Epi  / Non Sq Epi  / Bacteria         HBsAb 5.7      [07-15-23 @ 21:22]  HBsAg Nonreact      [07-15-23 @ 21:21]  HBcAb Nonreact      [07-15-23 @ 21:22]  HCV 0.16, Nonreact      [07-15-23 @ 21:22]  HIV Nonreact      [07-15-23 @ 21:21]

## 2023-08-11 NOTE — PROGRESS NOTE ADULT - SUBJECTIVE AND OBJECTIVE BOX
Patient is a 53y old  Male who presents with a chief complaint of UTI, fecal impaction (11 Aug 2023 13:48)      DATE OF SERVICE: 08-11-23 @ 14:07    SUBJECTIVE / OVERNIGHT EVENTS: overnight events noted    ROS:  Resp: No cough no sputum production  CVS: No chest pain no palpitations no orthopnea  GI: no N/V/D  "I feel fine'       MEDICATIONS  (STANDING):  aspirin enteric coated 81 milliGRAM(s) Oral daily  atorvastatin 40 milliGRAM(s) Oral at bedtime  bisacodyl 5 milliGRAM(s) Oral at bedtime  ceFAZolin   IVPB 1000 milliGRAM(s) IV Intermittent every 8 hours  dextrose 5%. 1000 milliLiter(s) (50 mL/Hr) IV Continuous <Continuous>  dextrose 5%. 1000 milliLiter(s) (100 mL/Hr) IV Continuous <Continuous>  dextrose 50% Injectable 25 Gram(s) IV Push once  dextrose 50% Injectable 12.5 Gram(s) IV Push once  dextrose 50% Injectable 25 Gram(s) IV Push once  famotidine    Tablet 20 milliGRAM(s) Oral daily  furosemide    Tablet 80 milliGRAM(s) Oral two times a day  glucagon  Injectable 1 milliGRAM(s) IntraMuscular once  immune   globulin 10% (GAMMAGARD) IVPB 9 Gram(s) IV Intermittent once  insulin glargine Injectable (LANTUS) 27 Unit(s) SubCutaneous at bedtime  insulin lispro (ADMELOG) corrective regimen sliding scale   SubCutaneous three times a day before meals  insulin lispro (ADMELOG) corrective regimen sliding scale   SubCutaneous at bedtime  insulin lispro Injectable (ADMELOG) 10 Unit(s) SubCutaneous three times a day before meals  mycophenolate mofetil 1000 milliGRAM(s) Oral two times a day  nystatin    Suspension 886032 Unit(s) Oral four times a day  polyethylene glycol 3350 17 Gram(s) Oral two times a day  senna 2 Tablet(s) Oral at bedtime  sevelamer carbonate 800 milliGRAM(s) Oral three times a day with meals  tacrolimus ER Tablet (ENVARSUS XR) 4 milliGRAM(s) Oral <User Schedule>  trimethoprim   80 mG/sulfamethoxazole 400 mG 1 Tablet(s) Oral daily  valGANciclovir 450 milliGRAM(s) Oral <User Schedule>    MEDICATIONS  (PRN):  dextrose Oral Gel 15 Gram(s) Oral once PRN Blood Glucose LESS THAN 70 milliGRAM(s)/deciliter        CAPILLARY BLOOD GLUCOSE      POCT Blood Glucose.: 203 mg/dL (11 Aug 2023 12:26)  POCT Blood Glucose.: 278 mg/dL (11 Aug 2023 08:39)  POCT Blood Glucose.: 261 mg/dL (10 Aug 2023 21:52)  POCT Blood Glucose.: 269 mg/dL (10 Aug 2023 17:13)    I&O's Summary    10 Aug 2023 07:01  -  11 Aug 2023 07:00  --------------------------------------------------------  IN: 600 mL / OUT: 1715 mL / NET: -1115 mL    11 Aug 2023 07:01  -  11 Aug 2023 14:07  --------------------------------------------------------  IN: 240 mL / OUT: 525 mL / NET: -285 mL        Vital Signs Last 24 Hrs  T(C): 36.6 (11 Aug 2023 12:55), Max: 36.9 (10 Aug 2023 17:00)  T(F): 97.9 (11 Aug 2023 12:55), Max: 98.5 (11 Aug 2023 06:50)  HR: 93 (11 Aug 2023 12:55) (80 - 94)  BP: 132/60 (11 Aug 2023 12:55) (107/65 - 132/60)  BP(mean): --  RR: 18 (11 Aug 2023 12:55) (18 - 18)  SpO2: 100% (11 Aug 2023 12:55) (95% - 100%)      PHYSICAL EXAM:  EYES: EOMI, PERRLA  CHEST/LUNG: clear   HEART: S1 S2; no murmurs   ABDOMEN: Soft, Nontender  LIANA drain +  EXTREMITIES:  no edema  NEUROLOGY: AO x 3 non-focal  SKIN: No rashes or lesions    LABS:                        8.1    4.34  )-----------( 197      ( 11 Aug 2023 07:28 )             25.3     08-11    134<L>  |  96  |  61<H>  ----------------------------<  272<H>  4.2   |  23  |  3.35<H>    Ca    10.0      11 Aug 2023 07:21  Phos  2.8     08-11  Mg     2.0     08-11      PT/INR - ( 11 Aug 2023 07:25 )   PT: 10.3 sec;   INR: 0.93 ratio         PTT - ( 11 Aug 2023 07:25 )  PTT:24.8 sec      Urinalysis Basic - ( 11 Aug 2023 07:21 )    Color: x / Appearance: x / SG: x / pH: x  Gluc: 272 mg/dL / Ketone: x  / Bili: x / Urobili: x   Blood: x / Protein: x / Nitrite: x   Leuk Esterase: x / RBC: x / WBC x   Sq Epi: x / Non Sq Epi: x / Bacteria: x          All consultant(s) notes reviewed and care discussed with other providers        Contact Number, Dr Murphy [phone]

## 2023-08-11 NOTE — PROGRESS NOTE ADULT - ASSESSMENT
Tee Salvador is a 54 y/o M with past medical history significant for HTN, CHF (s/p AICD 2016--last interrogated 7/15/23), CAD (s/p CABG 2015), IDDM, PVD s/p stent x4 in RLE with R big toe/second toe amputation (2021) and ESRD on HD via LUE AVF for 6 years now s/p DDRT 7/16/2023 with Thymoglobulin induction. Post-transplant course complicated by severe constipation requiring disimpaction, DGF requiring hemodialysis and LUE swelling with concern for L subclavian thrombus, now s/p IR fistulogram 7/7/23 with balloon angioplasty of subclavian vein. He was discharged home on 7/27/23 and remained on HD MWF at home center [facility]. He presents from home via EMS to [facility] on 8/7/23 with constipation and continued DGF    DGF s/p DDRT  -S/P IR biopsy on 8/10 with concern for AMR. will initiate PLEX/IVIG (3 sessions for now, while in house).  -DSA+ : will continue pulse dose steroids (500 on 8/8, 250 on 8/9, 125 today)  -HD per Nephrology, continue Lasix 80QD  -UTI: Klebsiella, CTX-->Ancef  -BCx negative  -LIANA to continue  -bowel regimen  -diet as tolerated  -SCDs  -Resume ASA in AM  -epogen today    Immunosuppression  -Envarsus per level  -MMF 1/1  -steroids as above  -PPx regimen: Valcyte/Bactrim/Nystatin/PPI with renal dosing    DM  -adjust while on steroids

## 2023-08-11 NOTE — PROGRESS NOTE ADULT - ASSESSMENT
52 yo M with h/o ESRD on HD x6 years via LUE AVF (outpatient nephrologist: Dr. Shaka Sanchez), underwent DDR on 7/16/23, DM2 CAD, CHF, underwent CABG 2015, AICD (2016), and severe PVD (underwent R big toe and second toe amputation in 2021) here for abdominal pain and constipation also with DGF requiring iHD, renal biopsy done 8/9/23.

## 2023-08-11 NOTE — PROGRESS NOTE ADULT - SUBJECTIVE AND OBJECTIVE BOX
Transplant Surgery - Multidisciplinary Progress Note  --------------------------------------------------------------  DDRT 7/16/2023    Present:   Patient seen and examined with multidisciplinary Transplant team including  Surgeon: Dr. Vizcaino, Dr. Calero, PGY 3 Dr. Montalvo. SHERIE Brock,  Nephrologist: Dr. Hairston/Dr. Schaefer and unit RN during am rounds.  Disciplines not in attendance will be notified of the plan.     HPI: Tee Salvador is a 54 y/o M with past medical history significant for HTN, CHF (s/p AICD 2016--last interrogated 7/15/23), CAD (s/p CABG 2015), IDDM, PVD s/p stent x4 in RLE with R big toe/second toe amputation (2021) and ESRD on HD via LUE AVF for 6 years now s/p DDRT 7/16/2023 with Thymoglobulin induction. Post-transplant course complicated by severe constipation requiring disimpaction, DGF requiring hemodialysis and LUE swelling with concern for L subclavian thrombus, now s/p IR fistulogram 7/7/23 with balloon angioplasty of subclavian vein. He was discharged home on 7/27/23 and remained on HD MWF at home center [facility]. He presents from home via EMS to [facility] on 8/7/23 with constipation and continued DGF    Interval Events:  Afebrile, VSS on ABX  improving graft function today  bx with concern for AMR  no complaints at this time    Immunosuppression:  Envarsus per level  MMF 1/1  pulse steroids with taper    Potential Discharge date: TBD    Education:  Medications    Plan of care:  See Below      MEDICATIONS  (STANDING):  aspirin enteric coated 81 milliGRAM(s) Oral daily  atorvastatin 40 milliGRAM(s) Oral at bedtime  bisacodyl 5 milliGRAM(s) Oral at bedtime  ceFAZolin   IVPB 1000 milliGRAM(s) IV Intermittent every 8 hours  dextrose 5%. 1000 milliLiter(s) (50 mL/Hr) IV Continuous <Continuous>  dextrose 5%. 1000 milliLiter(s) (100 mL/Hr) IV Continuous <Continuous>  dextrose 50% Injectable 25 Gram(s) IV Push once  dextrose 50% Injectable 12.5 Gram(s) IV Push once  dextrose 50% Injectable 25 Gram(s) IV Push once  epoetin arian-epbx (RETACRIT) Injectable 93764 Unit(s) SubCutaneous once  famotidine    Tablet 20 milliGRAM(s) Oral daily  furosemide    Tablet 80 milliGRAM(s) Oral two times a day  glucagon  Injectable 1 milliGRAM(s) IntraMuscular once  immune   globulin 10% (GAMMAGARD) IVPB 9 Gram(s) IV Intermittent once  insulin glargine Injectable (LANTUS) 20 Unit(s) SubCutaneous at bedtime  insulin lispro (ADMELOG) corrective regimen sliding scale   SubCutaneous at bedtime  insulin lispro (ADMELOG) corrective regimen sliding scale   SubCutaneous three times a day before meals  insulin lispro Injectable (ADMELOG) 10 Unit(s) SubCutaneous three times a day before meals  methylPREDNISolone sodium succinate Injectable 125 milliGRAM(s) IV Push once  mycophenolate mofetil 1000 milliGRAM(s) Oral two times a day  nystatin    Suspension 656808 Unit(s) Oral four times a day  polyethylene glycol 3350 17 Gram(s) Oral two times a day  senna 2 Tablet(s) Oral at bedtime  sevelamer carbonate 800 milliGRAM(s) Oral three times a day with meals  tacrolimus ER Tablet (ENVARSUS XR) 4 milliGRAM(s) Oral <User Schedule>  trimethoprim   80 mG/sulfamethoxazole 400 mG 1 Tablet(s) Oral daily  valGANciclovir 450 milliGRAM(s) Oral <User Schedule>    MEDICATIONS  (PRN):  dextrose Oral Gel 15 Gram(s) Oral once PRN Blood Glucose LESS THAN 70 milliGRAM(s)/deciliter      PAST MEDICAL & SURGICAL HISTORY:  Diabetes mellitus  type 2      Foot ulcer due to secondary DM      Coronary artery disease      Acute on chronic systolic heart failure      H/O kidney transplant      Breast Reduction  at age 18      Toe amputation status, left      S/P CABG (coronary artery bypass graft)      AICD (automatic cardioverter/defibrillator) present          Vital Signs Last 24 Hrs  T(C): 36.7 (11 Aug 2023 08:52), Max: 36.9 (10 Aug 2023 17:00)  T(F): 98 (11 Aug 2023 08:52), Max: 98.5 (11 Aug 2023 06:50)  HR: 88 (11 Aug 2023 08:52) (80 - 94)  BP: 128/57 (11 Aug 2023 08:52) (107/65 - 135/67)  BP(mean): --  RR: 18 (11 Aug 2023 08:52) (18 - 18)  SpO2: 100% (11 Aug 2023 08:52) (95% - 100%)    Parameters below as of 11 Aug 2023 08:52  Patient On (Oxygen Delivery Method): room air        I&O's Summary    10 Aug 2023 07:01  -  11 Aug 2023 07:00  --------------------------------------------------------  IN: 600 mL / OUT: 1715 mL / NET: -1115 mL    11 Aug 2023 07:01  -  11 Aug 2023 10:24  --------------------------------------------------------  IN: 120 mL / OUT: 200 mL / NET: -80 mL                              8.1    4.34  )-----------( 197      ( 11 Aug 2023 07:28 )             25.3     08-11    134<L>  |  96  |  61<H>  ----------------------------<  272<H>  4.2   |  23  |  3.35<H>    Ca    10.0      11 Aug 2023 07:21  Phos  2.8     08-11  Mg     2.0     08-11      Tacrolimus (), Serum: 6.2 ng/mL (08-11 @ 07:29)        Culture - Blood (collected 08-08-23 @ 07:49)  Source: .Blood Blood-Peripheral  Preliminary Report (08-10-23 @ 11:01):    No growth at 48 Hours    Culture - Blood (collected 08-08-23 @ 07:49)  Source: .Blood Blood-Peripheral  Preliminary Report (08-10-23 @ 11:01):    No growth at 48 Hours    Culture - Urine (collected 08-07-23 @ 05:21)  Source: Clean Catch Clean Catch (Midstream)  Final Report (08-10-23 @ 12:05):    50,000 - 99,000 CFU/mL Klebsiella pneumoniae  Organism: Klebsiella pneumoniae (08-10-23 @ 12:05)  Organism: Klebsiella pneumoniae (08-10-23 @ 12:05)                    Review of systems  Gen: No weight changes, fatigue, fevers/chills, weakness  Skin: No rashes  Head/Eyes/Ears/Mouth: No headache; Normal hearing; Normal vision w/o blurriness; No sinus pain/discomfort, sore throat  Respiratory: No dyspnea, cough, wheezing, hemoptysis  CV: No chest pain, PND, orthopnea  GI: no abdominal pain, denies diarrhea, constipation, nausea, vomiting, melena, hematochezia  : No increased frequency, dysuria, hematuria, nocturia  MSK: No joint pain/swelling; no back pain; no edema  Neuro: No dizziness/lightheadedness, weakness, seizures, numbness, tingling  Heme: No easy bruising or bleeding  Endo: No heat/cold intolerance  Psych: No significant nervousness, anxiety, stress, depression  All other systems were reviewed and are negative, except as noted.      PHYSICAL EXAM:  Constitutional: Well developed / well nourished  Eyes: Anicteric, PERRLA  ENMT: nc/at  Neck: supple  Respiratory: CTA B/L  Cardiovascular: RRR  Gastrointestinal: Soft, ND/NT.  well healed incisional scar. LIANA serous  Genitourinary: Voiding spontaneously  Extremities: SCD's in place and working bilaterally  Vascular: Palpable dp pulses bilaterally  Neurological: A&O x3  Skin: no rashes, ulcerations or lesions;  Musculoskeletal: Moving all extremities  Psychiatric: Responsive

## 2023-08-11 NOTE — CONSULT NOTE ADULT - CONSULT REQUESTED DATE/TIME
07-Aug-2023 12:04
07-Aug-2023 12:08
10-Aug-2023 11:57
08-Aug-2023 09:58
11-Aug-2023 16:50
07-Aug-2023 10:56
07-Aug-2023 13:00

## 2023-08-11 NOTE — PROGRESS NOTE ADULT - PROBLEM SELECTOR PLAN 2
Continue  Envarsus 4 mg qd, Cellcept 1 gm BID, and prednisone 5 mg qd. Tacro trough noted 7.2 yesterday. Check tacrolimus trough 30 minutes prior to AM dose, goal: 8-10. Pred held while patient on pulse dose steroids 8/8-

## 2023-08-11 NOTE — PROGRESS NOTE ADULT - SUBJECTIVE AND OBJECTIVE BOX
Subjective: Patient seen and examined. No new events except as noted.     SUBJECTIVE/ROS:  nad      MEDICATIONS:  MEDICATIONS  (STANDING):  aspirin enteric coated 81 milliGRAM(s) Oral daily  atorvastatin 40 milliGRAM(s) Oral at bedtime  bisacodyl 5 milliGRAM(s) Oral at bedtime  ceFAZolin   IVPB 1000 milliGRAM(s) IV Intermittent every 8 hours  dextrose 5%. 1000 milliLiter(s) (50 mL/Hr) IV Continuous <Continuous>  dextrose 5%. 1000 milliLiter(s) (100 mL/Hr) IV Continuous <Continuous>  dextrose 50% Injectable 25 Gram(s) IV Push once  dextrose 50% Injectable 12.5 Gram(s) IV Push once  dextrose 50% Injectable 25 Gram(s) IV Push once  famotidine    Tablet 20 milliGRAM(s) Oral daily  furosemide    Tablet 80 milliGRAM(s) Oral two times a day  glucagon  Injectable 1 milliGRAM(s) IntraMuscular once  insulin glargine Injectable (LANTUS) 20 Unit(s) SubCutaneous at bedtime  insulin lispro (ADMELOG) corrective regimen sliding scale   SubCutaneous three times a day before meals  insulin lispro (ADMELOG) corrective regimen sliding scale   SubCutaneous at bedtime  insulin lispro Injectable (ADMELOG) 10 Unit(s) SubCutaneous three times a day before meals  mycophenolate mofetil 1000 milliGRAM(s) Oral two times a day  nystatin    Suspension 540277 Unit(s) Oral four times a day  polyethylene glycol 3350 17 Gram(s) Oral two times a day  senna 2 Tablet(s) Oral at bedtime  sevelamer carbonate 800 milliGRAM(s) Oral three times a day with meals  tacrolimus ER Tablet (ENVARSUS XR) 4 milliGRAM(s) Oral <User Schedule>  trimethoprim   80 mG/sulfamethoxazole 400 mG 1 Tablet(s) Oral daily  valGANciclovir 450 milliGRAM(s) Oral <User Schedule>      PHYSICAL EXAM:  T(C): 36.9 (08-11-23 @ 06:50), Max: 36.9 (08-10-23 @ 17:00)  HR: 89 (08-11-23 @ 06:50) (80 - 94)  BP: 113/53 (08-11-23 @ 06:50) (107/65 - 135/67)  RR: 18 (08-11-23 @ 06:50) (18 - 18)  SpO2: 99% (08-11-23 @ 06:50) (95% - 100%)  Wt(kg): --  I&O's Summary    10 Aug 2023 07:01  -  11 Aug 2023 07:00  --------------------------------------------------------  IN: 600 mL / OUT: 1715 mL / NET: -1115 mL            JVP: Normal  Neck: supple  Lung: clear   CV: S1 S2 , Murmur:  Abd: soft  Ext: No edema  neuro: Awake / alert  Psych: flat affect  Skin: normal``    LABS/DATA:    CARDIAC MARKERS:                                8.1    4.34  )-----------( 197      ( 11 Aug 2023 07:28 )             25.3     08-11    134<L>  |  96  |  61<H>  ----------------------------<  272<H>  4.2   |  23  |  3.35<H>    Ca    10.0      11 Aug 2023 07:21  Phos  2.8     08-11  Mg     2.0     08-11      proBNP:   Lipid Profile:   HgA1c:   TSH:     TELE:  EKG:

## 2023-08-11 NOTE — CONSULT NOTE ADULT - PROVIDER SPECIALTY LIST ADULT
Nephrology
Transplant Nephrology
Intervent Radiology
Cardiology
Internal Medicine
Transplant ID
Transfusion Medicine

## 2023-08-11 NOTE — PROGRESS NOTE ADULT - ASSESSMENT
52 yo M with hx of DM2 CAD, CHF, s/p CABG 2015, AICD (2016), on hemodialysis for approximately 6 years via L AVF (nephrologist Dr. Bharath Romo), He has hx of PVD, is s/p 4 stents in R leg (mid and distal right superficial femoral artery, right popliteal x2 on 1/14/22).; is sp RT big toe and second toe amputation 2021, on asa and plavix for severe PVD (per pt). Now here for possible DDRT. Pt reports does not make urine only few drops occasionally. Pt denies N/V/D, fevers/chills, CP, SOB. Pt reports last ate at 4pm today and had HD yesterday 7/14/23.  Nephrology consulted for ESRD s/p DDRT.      s/p DDRT @ [facility] 7/16/2023   Initially received Simulect induction -- Changed to Thymoglobulin given delayed graft function.  Pt discharged on previous admission on HD.   Post transport course significant for DGF  Via L AVF --> s/p Left Radiocephalic fistulogram and subclavian vein angioplasty with Vascular 7/24/2023  Consent obtained from patient and placed in chart.  s/p CT A& P No contrast 8/7/23 --> partially imaged L pleural effusion, unchanged. Partially  imaged LLL opacity again seen.  small amount of dependent air within urinary bladder, small renal cysts, left transplant kidney with mil hydro. Stent extends from kidney to bladder.   s/p Doppler Renal US Transplant kidney 8/7/23 --> patent renal vasculature. Elevated renal artery peak systolic velocities. Concern for possible renal artery stenosis at anastomosis. Mild elevation  of intrarenal resistive indices , grossly unchanged from prior exam.   Continue Immunosuppression per transplant : Envarsus 4 mg qd, Cellcept 1 gm BID, and prednisone 5 mg qd.   Tacro trough is 7.2 8/10  s/p Transplant renal biopsy 8/09 -- Suggestive of AMR   Monitor labs and urine output. Avoid nephrotoxins  HD per transplant, last HD 8/7    Dose medications as per eGFR.  Renal Diet   Monitor     Anemia  Mgmt per Transplant - planning for LUX with HD  Iron panel pending    Maintain Hgb > 8  Monitor     HTN:   BP optimal     Monitor     Proteinuria / Hematuria   likely in setting of + LE, bacteria  Urine cx + Klebsiella   Mgmt per transplant    54 yo M with hx of DM2 CAD, CHF, s/p CABG 2015, AICD (2016), on hemodialysis for approximately 6 years via L AVF (nephrologist Dr. Bharath Romo), He has hx of PVD, is s/p 4 stents in R leg (mid and distal right superficial femoral artery, right popliteal x2 on 1/14/22).; is sp RT big toe and second toe amputation 2021, on asa and plavix for severe PVD (per pt). Now here for possible DDRT. Pt reports does not make urine only few drops occasionally. Pt denies N/V/D, fevers/chills, CP, SOB. Pt reports last ate at 4pm today and had HD yesterday 7/14/23.  Nephrology consulted for ESRD s/p DDRT.      s/p DDRT @ [facility] 7/16/2023   Initially received Simulect induction -- Changed to Thymoglobulin given delayed graft function.  Pt discharged on previous admission on HD.   Post transport course significant for DGF  Via L AVF --> s/p Left Radiocephalic fistulogram and subclavian vein angioplasty with Vascular 7/24/2023  Consent obtained from patient and placed in chart.  s/p CT A& P No contrast 8/7/23 --> partially imaged L pleural effusion, unchanged. Partially  imaged LLL opacity again seen.  small amount of dependent air within urinary bladder, small renal cysts, left transplant kidney with mil hydro. Stent extends from kidney to bladder.   s/p Doppler Renal US Transplant kidney 8/7/23 --> patent renal vasculature. Elevated renal artery peak systolic velocities. Concern for possible renal artery stenosis at anastomosis. Mild elevation  of intrarenal resistive indices , grossly unchanged from prior exam.   Continue Immunosuppression per transplant : Envarsus 4 mg qd, Cellcept 1 gm BID, and prednisone 5 mg qd.   Tacro trough is 7.2 8/10  s/p Transplant renal biopsy 8/09 -- concern for AMR. per transplant note plan to initiate PLEX/IVIG (3 sessions for now, while in house).  Monitor labs and urine output. Avoid nephrotoxins  HD per transplant, last HD 8/7    Dose medications as per eGFR.  Renal Diet   Monitor     Anemia  Mgmt per Transplant - planning for LUX with HD  Iron panel pending    Maintain Hgb > 8  Monitor     HTN:   BP optimal     Monitor     Proteinuria / Hematuria   likely in setting of + LE, bacteria  Urine cx + Klebsiella   Mgmt per transplant

## 2023-08-11 NOTE — CONSULT NOTE ADULT - ASSESSMENT
54 y/o M with past medical history significant for HTN, CHF (s/p AICD 2016--last interrogated 7/15/23), CAD (s/p CABG 2015), IDDM, PVD s/p stent x4 in RLE with R big toe/second toe amputation (2021) and ESRD on HD via LUE AVF for 6 years now s/p DDRT 7/16/2023 with Thymoglobulin induction. Kidney biopsy on 8/10 concerned for AMR and diabetic nephrosclerosis. BB is contacted to initiate PLEX. A course of 3 PLEX planned for now (8/11,8/12,8/14).    We will initiate PLEX today, 1 plasma volume with half donor plasma and half 5% albumin as replacement fluid, fluid balance 97%. PLEX#1 completed with no issues.    Therapeutic plasma exchange (PLEX) procedure (including replacement with 5% albumin and/or plasma and anticoagulation), benefits, risks and complications of the procedure (electrolyte imbalance, fluid imbalance, transfusion transmitted infections and transfusion reactions which may be life threatening) and alternative options explained in detail to the patient who verbalized understanding and consented to the procedure after all questions answered.

## 2023-08-11 NOTE — PROGRESS NOTE ADULT - ASSESSMENT
CAD s/p cabg  stable   asa, statin    chronic systolic chf  stable  improving LV function   resume coreg once deemed safe as moi primary team   off ace/arb/arni for now   s/p ICD    ESRD  s/p transplant   fu with transplant team   plan for plasmapheresis

## 2023-08-11 NOTE — PROGRESS NOTE ADULT - SUBJECTIVE AND OBJECTIVE BOX
[facility] NEPHROLOGY PRACTICE   MD DANIEL WALDEN MD BRIANA PETITO, NP    TEL:  FROM 9 AM to 5 PM ---OFFICE: [phone]  FROM 5 PM - 9 AM PLEASE CALL ANSWERING SERVICE: [phone]     RENAL PROGRESS NOTE: DATE OF SERVICE 08-11-23 @ 13:48  Patient is a 53y old  Male who presents with a chief complaint of UTI, fecal impaction   Patient seen and examined at bedside. No chest pain/sob    VITALS:  T(F): 97.9 (08-11-23 @ 12:55), Max: 98.5 (08-11-23 @ 06:50)  HR: 93 (08-11-23 @ 12:55)  BP: 132/60 (08-11-23 @ 12:55)  RR: 18 (08-11-23 @ 12:55)  SpO2: 100% (08-11-23 @ 12:55)  Wt(kg): --    08-10 @ 07:01  -  08-11 @ 07:00  --------------------------------------------------------  IN: 600 mL / OUT: 1715 mL / NET: -1115 mL    08-11 @ 07:01  -  08-11 @ 13:48  --------------------------------------------------------  IN: 240 mL / OUT: 525 mL / NET: -285 mL      Weight (kg): 91.7 (08-11 @ 09:00)    PHYSICAL EXAM:  Constitutional: NAD  Neck: No JVD  Respiratory: CTAB, no wheezes, rales or rhonchi  Cardiovascular: S1, S2, RRR  Gastrointestinal: BS+, soft, NT/ND  Extremities: No peripheral edema  Access: L AVF -- palpable thrill, bruit auscultated     Hospital Medications:   MEDICATIONS  (STANDING):  aspirin enteric coated 81 milliGRAM(s) Oral daily  atorvastatin 40 milliGRAM(s) Oral at bedtime  bisacodyl 5 milliGRAM(s) Oral at bedtime  ceFAZolin   IVPB 1000 milliGRAM(s) IV Intermittent every 8 hours  dextrose 5%. 1000 milliLiter(s) (50 mL/Hr) IV Continuous <Continuous>  dextrose 5%. 1000 milliLiter(s) (100 mL/Hr) IV Continuous <Continuous>  dextrose 50% Injectable 25 Gram(s) IV Push once  dextrose 50% Injectable 12.5 Gram(s) IV Push once  dextrose 50% Injectable 25 Gram(s) IV Push once  famotidine    Tablet 20 milliGRAM(s) Oral daily  furosemide    Tablet 80 milliGRAM(s) Oral two times a day  glucagon  Injectable 1 milliGRAM(s) IntraMuscular once  immune   globulin 10% (GAMMAGARD) IVPB 9 Gram(s) IV Intermittent once  insulin glargine Injectable (LANTUS) 27 Unit(s) SubCutaneous at bedtime  insulin lispro (ADMELOG) corrective regimen sliding scale   SubCutaneous three times a day before meals  insulin lispro (ADMELOG) corrective regimen sliding scale   SubCutaneous at bedtime  insulin lispro Injectable (ADMELOG) 10 Unit(s) SubCutaneous three times a day before meals  mycophenolate mofetil 1000 milliGRAM(s) Oral two times a day  nystatin    Suspension [id number] Unit(s) Oral four times a day  polyethylene glycol 3350 17 Gram(s) Oral two times a day  senna 2 Tablet(s) Oral at bedtime  sevelamer carbonate 800 milliGRAM(s) Oral three times a day with meals  tacrolimus ER Tablet (ENVARSUS XR) 4 milliGRAM(s) Oral <User Schedule>  trimethoprim   80 mG/sulfamethoxazole 400 mG 1 Tablet(s) Oral daily  valGANciclovir 450 milliGRAM(s) Oral <User Schedule>    Tacrolimus (), Serum: 6.2 ng/mL (08-11 @ 07:29)    LABS:  08-11    134<L>  |  96  |  61<H>  ----------------------------<  272<H>  4.2   |  23  |  3.35<H>    Ca    10.0      11 Aug 2023 07:21  Phos  2.8     08-11  Mg     2.0     08-11      Creatinine Trend: 3.35 <--, 3.72 <--, 3.45 <--, 3.43 <--, 5.01 <--, 4.54 <--    Phosphorus: 2.8 mg/dL (08-11 @ 07:21)                              8.1    4.34  )-----------( 197      ( 11 Aug 2023 07:28 )             25.3     Urine Studies:  Urinalysis - [08-11-23 @ 07:21]      Color  / Appearance  / SG  / pH       Gluc 272 / Ketone   / Bili  / Urobili        Blood  / Protein  / Leuk Est  / Nitrite       RBC  / WBC  / Hyaline  / Gran  / Sq Epi  / Non Sq Epi  / Bacteria         HBsAb 5.7      [07-15-23 @ 21:22]  HBsAg Nonreact      [07-15-23 @ 21:21]  HBcAb Nonreact      [07-15-23 @ 21:22]  HCV 0.16, Nonreact      [07-15-23 @ 21:22]  HIV Nonreact      [07-15-23 @ 21:21]      RADIOLOGY & ADDITIONAL STUDIES:

## 2023-08-11 NOTE — CONSULT NOTE ADULT - SUBJECTIVE AND OBJECTIVE BOX
Patient is a 53y old  Male who presents with a chief complaint of UTI, fecal impaction (07 Aug 2023 10:56)  hpi reviewed    INTERVAL HPI/OVERNIGHT EVENTS:    Medications:MEDICATIONS  (STANDING):  atorvastatin 40 milliGRAM(s) Oral at bedtime  bisacodyl 5 milliGRAM(s) Oral at bedtime  famotidine    Tablet 20 milliGRAM(s) Oral daily  furosemide    Tablet 80 milliGRAM(s) Oral two times a day  insulin glargine Injectable (LANTUS) 23 Unit(s) SubCutaneous at bedtime  insulin lispro Injectable (ADMELOG) 6 Unit(s) SubCutaneous three times a day before meals  mycophenolate mofetil 1000 milliGRAM(s) Oral two times a day  nystatin    Suspension 283796 Unit(s) Oral four times a day  polyethylene glycol 3350 17 Gram(s) Oral two times a day  predniSONE   Tablet 5 milliGRAM(s) Oral daily  senna 2 Tablet(s) Oral at bedtime  sevelamer carbonate 800 milliGRAM(s) Oral three times a day with meals  trimethoprim   80 mG/sulfamethoxazole 400 mG 1 Tablet(s) Oral daily  valGANciclovir 450 milliGRAM(s) Oral <User Schedule>    MEDICATIONS  (PRN):      Allergies: Allergies    Contrast. (Unknown)    Intolerances          FAMILY HISTORY:  Family history of diabetes mellitus (Father)          PAST MEDICAL & SURGICAL HISTORY:  Diabetes mellitus  type 2      Foot ulcer due to secondary DM      Coronary artery disease      Acute on chronic systolic heart failure      H/O kidney transplant      Breast Reduction  at age 18      Toe amputation status, left      S/P CABG (coronary artery bypass graft)      AICD (automatic cardioverter/defibrillator) present          REVIEW OF SYSTEMS:    EYES: No eye pain, visual disturbances, or discharge      RESPIRATORY: No cough, wheezing, chills or hemoptysis; No shortness of breath  CARDIOVASCULAR: No chest pain, palpitations, dizziness,   GASTROINTESTINAL: abd discomfort   GENITOURINARY: No dysuria, frequency, hematuria,   NEUROLOGICAL: No headaches, memory loss, loss of strength, numbness, or tremors      T(C): 36.5 (08-07-23 @ 04:59), Max: 36.7 (08-07-23 @ 03:36)  HR: 95 (08-07-23 @ 04:59) (95 - 99)  BP: 120/60 (08-07-23 @ 04:59) (110/54 - 120/60)  RR: 15 (08-07-23 @ 04:59) (15 - 20)  SpO2: 100% (08-07-23 @ 04:59) (100% - 100%)  Wt(kg): --Vital Signs Last 24 Hrs  T(C): 36.5 (07 Aug 2023 04:59), Max: 36.7 (07 Aug 2023 03:36)  T(F): 97.7 (07 Aug 2023 04:59), Max: 98 (07 Aug 2023 03:36)  HR: 95 (07 Aug 2023 04:59) (95 - 99)  BP: 120/60 (07 Aug 2023 04:59) (110/54 - 120/60)  BP(mean): 77 (07 Aug 2023 04:59) (77 - 77)  RR: 15 (07 Aug 2023 04:59) (15 - 20)  SpO2: 100% (07 Aug 2023 04:59) (100% - 100%)    Parameters below as of 07 Aug 2023 04:59  Patient On (Oxygen Delivery Method): room air      I&O's Summary      PHYSICAL EXAM:  GENERAL: NAD, well-groomed, well-developed  HEAD:  Atraumatic, Normocephalic  EYES: EOMI, PERRLA, conjunctiva and sclera clear  ENMT: No tonsillar erythema, exudates, or enlargement; Moist mucous membranes, Good dentition, No lesions  NECK: Supple, No JVD, Normal thyroid  NERVOUS SYSTEM:  Alert & Oriented X3, Good concentration; Motor Strength 5/5 B/L upper and lower extremities; DTRs 2+ intact and symmetric  CHEST/LUNG: Clear to percussion bilaterally; No rales, rhonchi, wheezing, or rubs  HEART: Regular rate and rhythm; No murmurs, rubs, or gallops  ABDOMEN: Soft, Nontender, Nondistended; Bowel sounds present  EXTREMITIES:  2+ Peripheral Pulses, No clubbing, cyanosis, or edema  LYMPH: No lymphadenopathy noted  SKIN: No rashes or lesions    Consultant(s) Notes Reviewed:  [x ] YES  [ ] NO  Care Discussed with Consultants/Other Providerscpk [ x] YES  [ ] NO    LABS:                    CBC Full  -  ( 07 Aug 2023 06:58 )  WBC Count : 3.98 K/uL  RBC Count : 2.46 M/uL  Hemoglobin : 7.6 g/dL  Hematocrit : 24.4 %  Platelet Count - Automated : 162 K/uL  Mean Cell Volume : 99.2 fl  Mean Cell Hemoglobin : 30.9 pg  Mean Cell Hemoglobin Concentration : 31.1 gm/dL  Auto Neutrophil # : 3.56 K/uL  Auto Lymphocyte # : 0.07 K/uL  Auto Monocyte # : 0.31 K/uL  Auto Eosinophil # : 0.00 K/uL  Auto Basophil # : 0.01 K/uL  Auto Neutrophil % : 89.3 %  Auto Lymphocyte % : 1.8 %  Auto Monocyte % : 7.8 %  Auto Eosinophil % : 0.0 %  Auto Basophil % : 0.3 %      08-07    138  |  97  |  48<H>  ----------------------------<  150<H>  3.8   |  23  |  5.01<H>    Ca    10.0      07 Aug 2023 06:58  Phos  3.7     08-07  Mg     2.3     08-07    TPro  5.8<L>  /  Alb  3.4  /  TBili  0.4  /  DBili  x   /  AST  9<L>  /  ALT  9<L>  /  AlkPhos  67  08-07      Urinalysis Basic - ( 07 Aug 2023 06:58 )    Color: x / Appearance: x / SG: x / pH: x  Gluc: 150 mg/dL / Ketone: x  / Bili: x / Urobili: x   Blood: x / Protein: x / Nitrite: x   Leuk Esterase: x / RBC: x / WBC x   Sq Epi: x / Non Sq Epi: x / Bacteria: x        PT/INR - ( 07 Aug 2023 06:58 )   PT: 11.2 sec;   INR: 1.07 ratio         PTT - ( 07 Aug 2023 06:58 )  PTT:30.0 sec  RADIOLOGY & ADDITIONAL TESTS:    Imaging Personally Reviewed:  [ ] YES  [ ] NO
Interventional Radiology    Evaluate for Procedure: Transplant renal Biopsy    HPI: 54 y/o M with past medical history significant for HTN, CHF (s/p AICD 2016), CAD (s/p CABG 2015), IDDM, PVD s/p stent x4 in RLE with R big toe/second toe amputation (2021) and ESRD on HD via LUE AVF for 6 years now s/p DDRT 7/16/2023 with Thymoglobulin induction. Post-transplant course complicated by severe constipation requiring disimpaction, DGF requiring hemodialysis and LUE swelling with concern for L subclavian thrombus, now s/p IR fistulogram 7/7/23 with balloon angioplasty of subclavian vein. He was discharged home on 7/27/23 and remained on HD MWF at home center [facility]. He presents from home via EMS to [facility] on 8/7/23 with abdominal discomfort and no bowel movement for 3 days. CT A/P in ED with distended bladder, mild hydronephrosis. IR consulted for transplant renal biopsy    Allergies: Contrast. (Unknown)    Medications (Abx/Cardiac/Anticoagulation/Blood Products)  cefTRIAXone   IVPB: 100 mL/Hr IV Intermittent (08-07 @ 09:34)  valGANciclovir: 450 milliGRAM(s) Oral (08-07 @ 09:35)    Data:  175.3  T(C): 36.5  HR: 95  BP: 120/60  RR: 15  SpO2: 100%    -WBC 3.98 / HgB 7.6 / Hct 24.4 / Plt 162  -Na 138 / Cl 97 / BUN 48 / Glucose 150  -K 3.8 / CO2 23 / Cr 5.01  -ALT 9 / Alk Phos 67 / T.Bili 0.4  -INR 1.07 / PTT 30.0      Assessment/Plan:  54 y/o M with past medical history significant for HTN, CHF (s/p AICD 2016), CAD (s/p CABG 2015), IDDM, PVD s/p stent x4 in RLE with R big toe/second toe amputation (2021) and ESRD on HD via LUE AVF for 6 years now s/p DDRT 7/16/2023 with Thymoglobulin induction. Post-transplant course complicated by severe constipation requiring disimpaction, DGF requiring hemodialysis and LUE swelling with concern for L subclavian thrombus, now s/p IR fistulogram 7/7/23 with balloon angioplasty of subclavian vein. He was discharged home on 7/27/23 and remained on HD MWF at home center [facility]. He presents from home via EMS to [facility] on 8/7/23 with abdominal discomfort and no bowel movement for 3 days. CT A/P in ED with distended bladder, mild hydronephrosis. IR consulted for transplant renal biopsy      - Will plan for transplant renal Biopsy on Wednesday 8/9/23  - Place IR procedure order approved by Dr. Bar  - NPO after midnight on 8/8/23  - Continue to hold ASA. Last dose on 8/3  - Maintain SBP of 150 or less for procedure  
[facility] DIVISION OF KIDNEY DISEASES AND HYPERTENSION -- INITIAL CONSULT NOTE  --------------------------------------------------------------------------------  HPI:   54 yo M with h/o ESRD on HD x6 years via LUE AVF (outpatient nephrologist: Dr. Shaka Sanchez), underwent DDR on 7/16/23, DM2 CAD, CHF, underwent CABG 2015, AICD (2016), and severe PVD (underwent R big toe and second toe amputation in 2021) here for abdominal pain and constipation. Patient had a DGF post op and is still continuing to require iHD. Goes to [facility]. Last iHD was Friday, Aug 4th. Patient was planned to undergo a renal biopsy this week but had to be brought in by EMS for abdominal discomfort and constipation. Patient also with reduced intake of PO. Still makes urine. Denies any headaches, fevers/chills, chest pain, abdominal pain, and SOB.    PAST HISTORY  --------------------------------------------------------------------------------  PAST MEDICAL & SURGICAL HISTORY:  Diabetes mellitus  type 2      Foot ulcer due to secondary DM      Coronary artery disease      Acute on chronic systolic heart failure      H/O kidney transplant      Breast Reduction  at age 18      Toe amputation status, left      S/P CABG (coronary artery bypass graft)      AICD (automatic cardioverter/defibrillator) present        FAMILY HISTORY:  Family history of diabetes mellitus (Father)      PAST SOCIAL HISTORY:    ALLERGIES & MEDICATIONS  --------------------------------------------------------------------------------  Allergies    Contrast. (Unknown)    Intolerances      Standing Inpatient Medications  atorvastatin 40 milliGRAM(s) Oral at bedtime  bisacodyl 5 milliGRAM(s) Oral at bedtime  famotidine    Tablet 20 milliGRAM(s) Oral daily  furosemide    Tablet 80 milliGRAM(s) Oral two times a day  insulin glargine Injectable (LANTUS) 23 Unit(s) SubCutaneous at bedtime  insulin lispro Injectable (ADMELOG) 6 Unit(s) SubCutaneous three times a day before meals  mycophenolate mofetil 1000 milliGRAM(s) Oral two times a day  nystatin    Suspension 253406 Unit(s) Oral four times a day  polyethylene glycol 3350 17 Gram(s) Oral two times a day  predniSONE   Tablet 5 milliGRAM(s) Oral daily  senna 2 Tablet(s) Oral at bedtime  sevelamer carbonate 800 milliGRAM(s) Oral three times a day with meals  trimethoprim   80 mG/sulfamethoxazole 400 mG 1 Tablet(s) Oral daily  valGANciclovir 450 milliGRAM(s) Oral <User Schedule>    PRN Inpatient Medications      REVIEW OF SYSTEMS  --------------------------------------------------------------------------------  per above     VITALS/PHYSICAL EXAM  --------------------------------------------------------------------------------  T(C): 36.5 (08-07-23 @ 04:59), Max: 36.7 (08-07-23 @ 03:36)  HR: 95 (08-07-23 @ 04:59) (95 - 99)  BP: 120/60 (08-07-23 @ 04:59) (110/54 - 120/60)  RR: 15 (08-07-23 @ 04:59) (15 - 20)  SpO2: 100% (08-07-23 @ 04:59) (100% - 100%)  Wt(kg): --  Height (cm): 175.3 (08-07-23 @ 03:36)      Physical Exam:  Gen: NAD  HEENT: PERRL, supple neck, clear oropharynx  Pulm: CTA B/L  CV: RRR, S1S2;  Abd: +BS, soft, nontender/nondistended  : No suprapubic tenderness  Transplant: Surgical site is c/d/i  Extremities: No LE edema  Skin: Warm, without rashes  Vascular access: +LUE AVF with thrill and bruit appreciated    LABS/STUDIES  --------------------------------------------------------------------------------              7.6    3.98  >-----------<  162      [08-07-23 @ 06:58]              24.4     138  |  97  |  48  ----------------------------<  150      [08-07-23 @ 06:58]  3.8   |  23  |  5.01        Ca     10.0     [08-07-23 @ 06:58]      Mg     2.3     [08-07-23 @ 05:13]      Phos  3.7     [08-07-23 @ 05:13]    TPro  5.8  /  Alb  3.4  /  TBili  0.4  /  DBili  x   /  AST  9   /  ALT  9   /  AlkPhos  67  [08-07-23 @ 06:58]    PT/INR: PT 11.2 , INR 1.07       [08-07-23 @ 06:58]  PTT: 30.0       [08-07-23 @ 06:58]      Creatinine Trend:  SCr 5.01 [08-07 @ 06:58]  SCr 4.54 [08-07 @ 05:13]  SCr 5.66 [07-28 @ 10:57]  SCr 5.45 [07-27 @ 06:36]  SCr 8.03 [07-26 @ 07:03]    Urinalysis - [08-07-23 @ 06:58]      Color  / Appearance  / SG  / pH       Gluc 150 / Ketone   / Bili  / Urobili        Blood  / Protein  / Leuk Est  / Nitrite       RBC  / WBC  / Hyaline  / Gran  / Sq Epi  / Non Sq Epi  / Bacteria         HBsAb 5.7      [07-15-23 @ 21:22]  HBsAb Reactive      [07-21-22 @ 17:23]  HBsAg Nonreact      [07-15-23 @ 21:21]  HBcAb Nonreact      [07-15-23 @ 21:22]  HCV 0.16, Nonreact      [07-15-23 @ 21:22]  HIV Nonreact      [07-15-23 @ 21:21]      TacrolimusTacrolimus (), Serum: 12.3 ng/mL (08-07 @ 05:13)    Cyclosporine  Sirolimus  Mycophenolate  BK PCR  CMV PCR  Parvo PCR  EBV PCR
[facility]   MD DANIEL WALDEN MD BRIANA PETITO, NP    TEL:  FROM 9 AM to 5 PM ---OFFICE: [phone]  FROM 5 PM - 9 AM PLEASE CALL ANSWERING SERVICE: [phone]    RENAL INITIAL CONSULT NOTE: DATE OF SERVICE 08-07-23 @ 12:04    HPI: Tee Salvador is a 54 y/o M with past medical history significant for HTN, CHF (s/p AICD 2016), CAD (s/p CABG 2015), IDDM, PVD s/p stent x4 in RLE with R big toe/second toe amputation (2021) and ESRD on HD via LUE AVF for 6 years now s/p DDRT 7/16/2023 with Thymoglobulin induction. Post-transplant course complicated by severe constipation requiring disimpaction, DGF requiring hemodialysis and LUE swelling with concern for L subclavian thrombus, now s/p IR fistulogram 7/7/23 with balloon angioplasty of subclavian vein. He was discharged home on 7/27/23 and remained on HD MWF at home center [facility]. He presents from home via EMS to [facility] on 8/7/23 with abdominal discomfort and no bowel movement for 3 days. Patient reports that he has had no bowel movement, reports this is "just like what happened last time". Reports he is straining but has "no abdominal muscles" to have BM. Denies nausea/vomiting. Reports reduced PO intake and appetite. Denies fevers, chills. Reports increasing urine output up to 500ml/day, still with LIANA in place from transplant surgery. Reports his last HD was on Friday 8/4/23 with 1.75L removed. Nephrology consulted for s/p DDRT, hemodialysis.       Allergies:  Contrast. (Unknown)      PAST MEDICAL & SURGICAL HISTORY:  Diabetes mellitus  type 2      Foot ulcer due to secondary DM      Coronary artery disease      Acute on chronic systolic heart failure      H/O kidney transplant      Breast Reduction  at age 18      Toe amputation status, left      S/P CABG (coronary artery bypass graft)      AICD (automatic cardioverter/defibrillator) present          Home Medications Reviewed    Hospital Medications:   MEDICATIONS  (STANDING):  atorvastatin 40 milliGRAM(s) Oral at bedtime  bisacodyl 5 milliGRAM(s) Oral at bedtime  famotidine    Tablet 20 milliGRAM(s) Oral daily  furosemide    Tablet 80 milliGRAM(s) Oral two times a day  insulin glargine Injectable (LANTUS) 23 Unit(s) SubCutaneous at bedtime  insulin lispro Injectable (ADMELOG) 6 Unit(s) SubCutaneous three times a day before meals  mycophenolate mofetil 1000 milliGRAM(s) Oral two times a day  nystatin    Suspension 420138 Unit(s) Oral four times a day  polyethylene glycol 3350 17 Gram(s) Oral two times a day  predniSONE   Tablet 5 milliGRAM(s) Oral daily  senna 2 Tablet(s) Oral at bedtime  sevelamer carbonate 800 milliGRAM(s) Oral three times a day with meals  trimethoprim   80 mG/sulfamethoxazole 400 mG 1 Tablet(s) Oral daily  valGANciclovir 450 milliGRAM(s) Oral <User Schedule>      SOCIAL HISTORY:  Denies ETOh, Smoking,     FAMILY HISTORY:  Family history of diabetes mellitus (Father)        REVIEW OF SYSTEMS:  CONSTITUTIONAL: No weakness, fevers or chills  EYES/ENT: No visual changes;  No vertigo or throat pain   NECK: No pain or stiffness  RESPIRATORY: No cough, wheezing, hemoptysis; No shortness of breath  CARDIOVASCULAR: No chest pain or palpitations.  GASTROINTESTINAL: SEE HPI   GENITOURINARY: SEE HPI   NEUROLOGICAL: No numbness or weakness  SKIN: No itching, burning, rashes, or lesions   VASCULAR: No bilateral lower extremity edema.   All other review of systems is negative unless indicated above.    VITALS:  T(F): 97.7 (08-07-23 @ 04:59), Max: 98 (08-07-23 @ 03:36)  HR: 95 (08-07-23 @ 04:59)  BP: 120/60 (08-07-23 @ 04:59)  RR: 15 (08-07-23 @ 04:59)  SpO2: 100% (08-07-23 @ 04:59)  Wt(kg): --    Height (cm): 175.3 (08-07 @ 03:36)    PHYSICAL EXAM:  Constitutional: NAD  HEENT: anicteric sclera, oropharynx clear, MMM  Neck: No JVD  Respiratory: CTAB, no wheezes, rales or rhonchi  Cardiovascular: S1, S2, RRR  Gastrointestinal: BS+, distended, NT. LIANA drain in place.   Extremities: b/l LE +1 edema   Neurological: A/O x 3, no focal deficits  Psychiatric: Normal mood, normal affect  : No CVA tenderness. No granda.   Skin: No rashes  Vascular Access: L AVF ( + thrill, bruit auscultated)     LABS:  08-07    138  |  97  |  48<H>  ----------------------------<  150<H>  3.8   |  23  |  5.01<H>    Ca    10.0      07 Aug 2023 06:58  Phos  3.7     08-07  Mg     2.3     08-07    TPro  5.8<L>  /  Alb  3.4  /  TBili  0.4  /  DBili      /  AST  9<L>  /  ALT  9<L>  /  AlkPhos  67  08-07    Creatinine Trend: 5.01 <--, 4.54 <--                        7.6    3.98  )-----------( 162      ( 07 Aug 2023 06:58 )             24.4     Urine Studies:  Urinalysis Basic - ( 07 Aug 2023 06:58 )    Color:  / Appearance:  / SG:  / pH:   Gluc: 150 mg/dL / Ketone:   / Bili:  / Urobili:    Blood:  / Protein:  / Nitrite:    Leuk Esterase:  / RBC:  / WBC    Sq Epi:  / Non Sq Epi:  / Bacteria:           RADIOLOGY & ADDITIONAL STUDIES:                 
Patient is a 53y old  Male who presents with a chief complaint of UTI, fecal impaction (10 Aug 2023 08:49)      HPI:  Tee Salvador is a 54 y/o M with past medical history significant for HTN, CHF (s/p AICD 2016), CAD (s/p CABG 2015), IDDM, PVD s/p stent x4 in RLE with R big toe/second toe amputation (2021) and ESRD on HD via LUE AVF for 6 years now s/p DDRT 7/16/2023 with Thymoglobulin induction. Post-transplant course complicated by severe constipation requiring disimpaction, DGF requiring hemodialysis and LUE swelling with concern for L subclavian thrombus, now s/p IR fistulogram 7/7/23 with balloon angioplasty of subclavian vein. He was discharged home on 7/27/23 and remained on HD MWF at home center [facility]. He presents from home via EMS to [facility] on 8/7/23 with abdominal discomfort and no bowel movement for 3 days.     Patient reports that he has had no bowel movement, reports this is "just like what happened last time". Reports he is straining but has "no abdominal muscles" to have BM. Denies nausea/vomiting. Reports reduced PO intake and appetite. Denies fevers, chills. Reports increasing urine output up to 500ml/day, still with LIANA in place from transplant surgery. Reports his last HD was on Friday 8/4/23 with 1.75L removed.      Noted to have asymptomatic bacteruria this hospitalization, denies weak stream, incomplete emptying of bladder, dysuria, frequency, urgency or hematuria.      prior hospital charts reviewed [X  ]  primary team notes reviewed [ X ]  other consultant notes reviewed [ X ]    PAST MEDICAL & SURGICAL HISTORY:  Diabetes mellitus  type 2      Foot ulcer due to secondary DM      Coronary artery disease      Acute on chronic systolic heart failure      H/O kidney transplant      Breast Reduction  at age 18      Toe amputation status, left      S/P CABG (coronary artery bypass graft)      AICD (automatic cardioverter/defibrillator) present          Allergies  Contrast. (Unknown)    ANTIMICROBIALS (past 90 days)  MEDICATIONS  (STANDING):  cefTRIAXone   IVPB   100 mL/Hr IV Intermittent (08-10-23 @ 09:13)   100 mL/Hr IV Intermittent (08-09-23 @ 08:08)   100 mL/Hr IV Intermittent (08-08-23 @ 09:33)    cefTRIAXone   IVPB   100 mL/Hr IV Intermittent (08-07-23 @ 09:34)    nystatin    Suspension   693856 Unit(s) Oral (08-10-23 @ 05:58)   786897 Unit(s) Oral (08-09-23 @ 23:54)   618034 Unit(s) Oral (08-09-23 @ 16:37)   080572 Unit(s) Oral (08-09-23 @ 12:15)   [id number] Unit(s) Oral (08-09-23 @ 06:12)   695420 Unit(s) Oral (08-08-23 @ 21:40)   696042 Unit(s) Oral (08-08-23 @ 17:55)   [id number] Unit(s) Oral (08-08-23 @ 11:17)   351626 Unit(s) Oral (08-08-23 @ 05:00)   [id number] Unit(s) Oral (08-07-23 @ 23:06)   703568 Unit(s) Oral (08-07-23 @ 18:43)   [id number] Unit(s) Oral (08-07-23 @ 11:32)    trimethoprim   80 mG/sulfamethoxazole 400 mG   1 Tablet(s) Oral (08-09-23 @ 12:16)   1 Tablet(s) Oral (08-08-23 @ 11:17)   1 Tablet(s) Oral (08-07-23 @ 18:32)    valGANciclovir   450 milliGRAM(s) Oral (08-09-23 @ 08:08)   450 milliGRAM(s) Oral (08-07-23 @ 09:35)      ANTIMICROBIALS:    nystatin    Suspension [id number] four times a day  trimethoprim   80 mG/sulfamethoxazole 400 mG 1 daily  valGANciclovir 450 <User Schedule>    OTHER MEDS: MEDICATIONS  (STANDING):  atorvastatin 40 at bedtime  bisacodyl 5 at bedtime  dextrose 50% Injectable 12.5 once  dextrose 50% Injectable 25 once  dextrose 50% Injectable 25 once  dextrose Oral Gel 15 once PRN  famotidine    Tablet 20 daily  furosemide    Tablet 80 two times a day  glucagon  Injectable 1 once  insulin glargine Injectable (LANTUS) 20 at bedtime  insulin lispro (ADMELOG) corrective regimen sliding scale  at bedtime  insulin lispro (ADMELOG) corrective regimen sliding scale  three times a day before meals  insulin lispro Injectable (ADMELOG) 8 three times a day before meals  mycophenolate mofetil 1000 two times a day  polyethylene glycol 3350 17 two times a day  senna 2 at bedtime  tacrolimus ER Tablet (ENVARSUS XR) 4 <User Schedule>    SOCIAL HISTORY:   hx smoking  non-smoker    FAMILY HISTORY:  Family history of diabetes mellitus (Father)      REVIEW OF SYSTEMS  [  ] ROS unobtainable because:    [x  ] All other systems negative except as noted below:	    Constitutional:  [ ] fever [ ] chills  [ ] weight loss  [ ] weakness  Skin:  [ ] rash [ ] phlebitis	  Eyes: [ ] icterus [ ] pain  [ ] discharge	  ENMT: [ ] sore throat  [ ] thrush [ ] ulcers [ ] exudates  Respiratory: [ ] dyspnea [ ] hemoptysis [ ] cough [ ] sputum	  Cardiovascular:  [ ] chest pain [ ] palpitations [ ] edema	  Gastrointestinal:  [ ] nausea [ ] vomiting [ ] diarrhea [ ] constipation [ ] pain	  Genitourinary:  [ ] dysuria [ ] frequency [ ] hematuria [ ] discharge [ ] flank pain  [ ] incontinence  Musculoskeletal:  [ ] myalgias [ ] arthralgias [ ] arthritis  [ ] back pain  Neurological:  [ ] headache [ ] seizures  [ ] confusion/altered mental status  Psychiatric:  [ ] anxiety [ ] depression	  Hematology/Lymphatics:  [ ] lymphadenopathy  Endocrine:  [ ] adrenal [ ] thyroid  Allergic/Immunologic:	 [ ] transplant [ ] seasonal    Vital Signs Last 24 Hrs  T(F): 97.9 (08-10-23 @ 09:00), Max: 98.6 (08-07-23 @ 20:20)  Vital Signs Last 24 Hrs  HR: 92 (08-10-23 @ 09:00) (80 - 96)  BP: 132/66 (08-10-23 @ 09:00) (117/64 - 134/69)  RR: 18 (08-10-23 @ 09:00)  SpO2: 100% (08-10-23 @ 09:00) (97% - 100%)  Wt(kg): --    PHYSICAL EXAMINATION:  General: Alert and Awake, NAD  HEENT: PERRL, EOMI, No subconjunctival hemorrhages, Oropharynx Clear, MMM  Neck: Supple, No NATI  Cardiac: RRR, No M/R/G  Resp: CTAB, No Wh/Rh/Ra  Abdomen: NBS, NT/ND, No HSM, No rigidity or guarding. x1 LIANA drain LLQ  MSK: No LE edema. No stigmata of IE. No evidence of phlebitis. No evidence of synovitis.  : No Samuels  Skin: No rashes or lesions. Skin is warm and dry to the touch.   Neuro: Alert and Awake. CN 2-12 Grossly intact. Moves all four extremities spontaneously.  Psych: Calm, Pleasant, Cooperative                          8.0    4.38  )-----------( 171      ( 10 Aug 2023 07:40 )             24.9     08-10    137  |  96  |  56<H>  ----------------------------<  307<H>  4.2   |  23  |  3.72<H>    Ca    10.0      10 Aug 2023 07:38  Phos  3.6     08-10  Mg     2.1     08-10      Urinalysis Basic - ( 10 Aug 2023 07:38 )    Color: x / Appearance: x / SG: x / pH: x  Gluc: 307 mg/dL / Ketone: x  / Bili: x / Urobili: x   Blood: x / Protein: x / Nitrite: x   Leuk Esterase: x / RBC: x / WBC x   Sq Epi: x / Non Sq Epi: x / Bacteria: x    MICROBIOLOGY:  Culture - Blood (collected 08 Aug 2023 07:49)  Source: .Blood Blood-Peripheral  Preliminary Report (10 Aug 2023 11:01):    No growth at 48 Hours    Culture - Blood (collected 08 Aug 2023 07:49)  Source: .Blood Blood-Peripheral  Preliminary Report (10 Aug 2023 11:01):    No growth at 48 Hours    Culture - Urine (collected 07 Aug 2023 05:21)  Source: Clean Catch Clean Catch (Midstream)  Preliminary Report (09 Aug 2023 15:49):    50,000 - 99,000 CFU/mL Klebsiella pneumoniae                    RADIOLOGY:    <The imaging below has been reviewed and visualized by me independently. Findings as detailed in report below>    < from: US Trans Kidney w/ Doppler, Left (08.07.23 @ 08:35) >  IMPRESSION:    1.  Patent renal vasculature. Elevated renal artery  peak systolic   velocities, measuring 372 cm/s at the anastomosis.. Although no tardus   parvus waveform morphology is identified within the kidney, these   findings are concerning for possible renal artery stenosis at the   anastomosis.. Advise further evaluation as clinically warranted.  2.  Mild elevation of intrarenal resistive indices, grossly unchanged   from prior exam. Differential considerations include ATN, rejection,   nephrotoxicity. Clinical correlation is again recommended.  3.  Stable mild hydronephrosis. Indwelling ureteral stent.    --- End of Report ---          < end of copied text >  
HPI:  54 yo M with h/o ESRD on HD x6 years via LUE AVF (outpatient nephrologist: Dr. Shaka Sanchez), underwent DDRT on 7/16/23, DM2 CAD, CHF, underwent CABG 2015, AICD (2016), and severe PVD (underwent R big toe and second toe amputation in 2021) here for abdominal pain and constipation. Patient had a DGF post op and is still continuing to require iHD. Goes to [facility]. Last iHD was Friday, Aug 4th. Patient was planned to undergo a renal biopsy this week but had to be brought in by EMS for abdominal discomfort and constipation. Patient also with reduced intake of PO. Still makes urine. s/p renal biopsy on 8/9. Renal biopsy showing concern for AMR and diabetic nephrosclerosis. BB is contacted to initiate PLEX.    PAST MEDICAL & SURGICAL HISTORY:  Diabetes mellitus  type 2      Foot ulcer due to secondary DM      Coronary artery disease      Acute on chronic systolic heart failure      H/O kidney transplant      Breast Reduction  at age 18      Toe amputation status, left      S/P CABG (coronary artery bypass graft)      AICD (automatic cardioverter/defibrillator) present      Allergies    Contrast. (Unknown)    Intolerances    MEDICATIONS  (STANDING):  aspirin enteric coated 81 milliGRAM(s) Oral daily  atorvastatin 40 milliGRAM(s) Oral at bedtime  bisacodyl 5 milliGRAM(s) Oral at bedtime  ceFAZolin   IVPB 1000 milliGRAM(s) IV Intermittent every 8 hours  dextrose 5%. 1000 milliLiter(s) (50 mL/Hr) IV Continuous <Continuous>  dextrose 5%. 1000 milliLiter(s) (100 mL/Hr) IV Continuous <Continuous>  dextrose 50% Injectable 12.5 Gram(s) IV Push once  dextrose 50% Injectable 25 Gram(s) IV Push once  dextrose 50% Injectable 25 Gram(s) IV Push once  famotidine    Tablet 20 milliGRAM(s) Oral daily  furosemide    Tablet 80 milliGRAM(s) Oral two times a day  glucagon  Injectable 1 milliGRAM(s) IntraMuscular once  immune   globulin 10% (GAMMAGARD) IVPB 9 Gram(s) IV Intermittent once  insulin glargine Injectable (LANTUS) 27 Unit(s) SubCutaneous at bedtime  insulin lispro (ADMELOG) corrective regimen sliding scale   SubCutaneous at bedtime  insulin lispro (ADMELOG) corrective regimen sliding scale   SubCutaneous three times a day before meals  insulin lispro Injectable (ADMELOG) 10 Unit(s) SubCutaneous three times a day before meals  mycophenolate mofetil 1000 milliGRAM(s) Oral two times a day  nystatin    Suspension 838054 Unit(s) Oral four times a day  polyethylene glycol 3350 17 Gram(s) Oral two times a day  senna 2 Tablet(s) Oral at bedtime  sevelamer carbonate 800 milliGRAM(s) Oral three times a day with meals  tacrolimus ER Tablet (ENVARSUS XR) 4 milliGRAM(s) Oral <User Schedule>  trimethoprim   80 mG/sulfamethoxazole 400 mG 1 Tablet(s) Oral daily  valGANciclovir 450 milliGRAM(s) Oral <User Schedule>    MEDICATIONS  (PRN):  dextrose Oral Gel 15 Gram(s) Oral once PRN Blood Glucose LESS THAN 70 milliGRAM(s)/deciliter    Vital Signs Last 24 Hrs  T(C): 36.6 (11 Aug 2023 12:55), Max: 36.9 (10 Aug 2023 17:00)  T(F): 97.9 (11 Aug 2023 12:55), Max: 98.5 (11 Aug 2023 06:50)  HR: 93 (11 Aug 2023 12:55) (80 - 94)  BP: 132/60 (11 Aug 2023 12:55) (107/65 - 132/60)  BP(mean): --  RR: 18 (11 Aug 2023 12:55) (18 - 18)  SpO2: 100% (11 Aug 2023 12:55) (95% - 100%)    Parameters below as of 11 Aug 2023 12:55  Patient On (Oxygen Delivery Method): room air                          8.1    4.34  )-----------( 197      ( 11 Aug 2023 07:28 )             25.3   08-11    134<L>  |  96  |  61<H>  ----------------------------<  272<H>  4.2   |  23  |  3.35<H>    Ca    10.0      11 Aug 2023 07:21  Phos  2.8     08-11  Mg     2.0     08-11    PT/INR - ( 11 Aug 2023 07:25 )   PT: 10.3 sec;   INR: 0.93 ratio         PTT - ( 11 Aug 2023 07:25 )  PTT:24.8 sec
CHIEF COMPLAINT:Patient is a 53y old  Male who presents with a chief complaint of UTI, fecal impaction (08 Aug 2023 09:52)      HISTORY OF PRESENT ILLNESS:    53 male with history as below , recent kidney transplant admitted with UTI, constipation   denies any chest pain, sob, palpitation, dizziness or syncope.     PAST MEDICAL & SURGICAL HISTORY:  Diabetes mellitus  type 2      Foot ulcer due to secondary DM      Coronary artery disease      Acute on chronic systolic heart failure      H/O kidney transplant      Breast Reduction  at age 18      Toe amputation status, left      S/P CABG (coronary artery bypass graft)      AICD (automatic cardioverter/defibrillator) present              MEDICATIONS:  furosemide    Tablet 80 milliGRAM(s) Oral two times a day    cefTRIAXone   IVPB 1000 milliGRAM(s) IV Intermittent every 24 hours  nystatin    Suspension 650432 Unit(s) Oral four times a day  trimethoprim   80 mG/sulfamethoxazole 400 mG 1 Tablet(s) Oral daily  valGANciclovir 450 milliGRAM(s) Oral <User Schedule>        bisacodyl 5 milliGRAM(s) Oral at bedtime  famotidine    Tablet 20 milliGRAM(s) Oral daily  polyethylene glycol 3350 17 Gram(s) Oral two times a day  senna 2 Tablet(s) Oral at bedtime    atorvastatin 40 milliGRAM(s) Oral at bedtime  insulin glargine Injectable (LANTUS) 23 Unit(s) SubCutaneous at bedtime  insulin lispro Injectable (ADMELOG) 6 Unit(s) SubCutaneous three times a day before meals  methylPREDNISolone sodium succinate IVPB 500 milliGRAM(s) IV Intermittent once  predniSONE   Tablet 5 milliGRAM(s) Oral daily    lactated ringers. 1000 milliLiter(s) IV Continuous <Continuous>  mycophenolate mofetil 1000 milliGRAM(s) Oral two times a day      FAMILY HISTORY:  Family history of diabetes mellitus (Father)        Non-contributory    SOCIAL HISTORY:    No tobacco, drugs or etoh    Allergies    Contrast. (Unknown)    Intolerances    	    REVIEW OF SYSTEMS:  as above  The rest of the 14 points ROS reviewed and except above they are unremarkable.        PHYSICAL EXAM:  T(C): 36.7 (08-08-23 @ 09:36), Max: 37 (08-07-23 @ 20:20)  HR: 93 (08-08-23 @ 09:36) (73 - 109)  BP: 135/71 (08-08-23 @ 09:36) (100/52 - 135/71)  RR: 18 (08-08-23 @ 09:36) (16 - 18)  SpO2: 99% (08-08-23 @ 09:36) (96% - 100%)  Wt(kg): --  I&O's Summary    07 Aug 2023 07:01  -  08 Aug 2023 07:00  --------------------------------------------------------  IN: 120 mL / OUT: 1650 mL / NET: -1530 mL        JVP: Normal  Neck: supple  Lung: clear   CV: S1 S2 , Murmur:  Abd: soft  Ext: No edema  neuro: Awake / alert  Psych: flat affect  Skin: normal    LABS/DATA:    TELEMETRY: 	    ECG:  	   	  CARDIAC MARKERS:                        101 <<== 08-07-23 @ 05:13                              8.4    4.89  )-----------( 175      ( 08 Aug 2023 07:44 )             26.3     08-08    139  |  96  |  31<H>  ----------------------------<  124<H>  3.6   |  27  |  3.43<H>    Ca    9.8      08 Aug 2023 07:42  Phos  2.9     08-08  Mg     2.2     08-08    TPro  5.8<L>  /  Alb  3.4  /  TBili  0.4  /  DBili  x   /  AST  9<L>  /  ALT  9<L>  /  AlkPhos  67  08-07    proBNP:   Lipid Profile:   HgA1c:   TSH:

## 2023-08-11 NOTE — CONSULT NOTE ADULT - REASON FOR ADMISSION
UTI, fecal impaction

## 2023-08-12 DIAGNOSIS — R79.89 OTHER SPECIFIED ABNORMAL FINDINGS OF BLOOD CHEMISTRY: ICD-10-CM

## 2023-08-12 DIAGNOSIS — T86.11 KIDNEY TRANSPLANT REJECTION: ICD-10-CM

## 2023-08-12 LAB
ANION GAP SERPL CALC-SCNC: 14 MMOL/L — SIGNIFICANT CHANGE UP (ref 5–17)
APTT BLD: 24.9 SEC — SIGNIFICANT CHANGE UP (ref 24.5–35.6)
BASOPHILS # BLD AUTO: 0 K/UL — SIGNIFICANT CHANGE UP (ref 0–0.2)
BASOPHILS NFR BLD AUTO: 0 % — SIGNIFICANT CHANGE UP (ref 0–2)
BUN SERPL-MCNC: 58 MG/DL — HIGH (ref 7–23)
CA-I BLD-SCNC: 1.3 MMOL/L — SIGNIFICANT CHANGE UP (ref 1.15–1.33)
CALCIUM SERPL-MCNC: 9.6 MG/DL — SIGNIFICANT CHANGE UP (ref 8.4–10.5)
CHLORIDE SERPL-SCNC: 101 MMOL/L — SIGNIFICANT CHANGE UP (ref 96–108)
CO2 SERPL-SCNC: 24 MMOL/L — SIGNIFICANT CHANGE UP (ref 22–31)
CREAT SERPL-MCNC: 2.54 MG/DL — HIGH (ref 0.5–1.3)
EGFR: 30 ML/MIN/1.73M2 — LOW
EOSINOPHIL # BLD AUTO: 0.03 K/UL — SIGNIFICANT CHANGE UP (ref 0–0.5)
EOSINOPHIL NFR BLD AUTO: 0.9 % — SIGNIFICANT CHANGE UP (ref 0–6)
FIBRINOGEN PPP-MCNC: 181 MG/DL — LOW (ref 200–445)
GLUCOSE BLDC GLUCOMTR-MCNC: 168 MG/DL — HIGH (ref 70–99)
GLUCOSE BLDC GLUCOMTR-MCNC: 189 MG/DL — HIGH (ref 70–99)
GLUCOSE BLDC GLUCOMTR-MCNC: 201 MG/DL — HIGH (ref 70–99)
GLUCOSE BLDC GLUCOMTR-MCNC: 256 MG/DL — HIGH (ref 70–99)
GLUCOSE SERPL-MCNC: 206 MG/DL — HIGH (ref 70–99)
HCT VFR BLD CALC: 23.6 % — LOW (ref 39–50)
HGB BLD-MCNC: 7.5 G/DL — LOW (ref 13–17)
INR BLD: 1.05 RATIO — SIGNIFICANT CHANGE UP (ref 0.85–1.18)
LYMPHOCYTES # BLD AUTO: 0.03 K/UL — LOW (ref 1–3.3)
LYMPHOCYTES # BLD AUTO: 0.9 % — LOW (ref 13–44)
MAGNESIUM SERPL-MCNC: 1.7 MG/DL — SIGNIFICANT CHANGE UP (ref 1.6–2.6)
MANUAL SMEAR VERIFICATION: SIGNIFICANT CHANGE UP
MCHC RBC-ENTMCNC: 30.9 PG — SIGNIFICANT CHANGE UP (ref 27–34)
MCHC RBC-ENTMCNC: 31.8 GM/DL — LOW (ref 32–36)
MCV RBC AUTO: 97.1 FL — SIGNIFICANT CHANGE UP (ref 80–100)
MONOCYTES # BLD AUTO: 0.24 K/UL — SIGNIFICANT CHANGE UP (ref 0–0.9)
MONOCYTES NFR BLD AUTO: 6.9 % — SIGNIFICANT CHANGE UP (ref 2–14)
NEUTROPHILS # BLD AUTO: 3.15 K/UL — SIGNIFICANT CHANGE UP (ref 1.8–7.4)
NEUTROPHILS NFR BLD AUTO: 91.3 % — HIGH (ref 43–77)
PHOSPHATE SERPL-MCNC: 2.6 MG/DL — SIGNIFICANT CHANGE UP (ref 2.5–4.5)
PLAT MORPH BLD: NORMAL — SIGNIFICANT CHANGE UP
PLATELET # BLD AUTO: 164 K/UL — SIGNIFICANT CHANGE UP (ref 150–400)
POTASSIUM SERPL-MCNC: 3.8 MMOL/L — SIGNIFICANT CHANGE UP (ref 3.5–5.3)
POTASSIUM SERPL-SCNC: 3.8 MMOL/L — SIGNIFICANT CHANGE UP (ref 3.5–5.3)
PROTHROM AB SERPL-ACNC: 11.5 SEC — SIGNIFICANT CHANGE UP (ref 9.5–13)
RBC # BLD: 2.43 M/UL — LOW (ref 4.2–5.8)
RBC # FLD: 18.2 % — HIGH (ref 10.3–14.5)
RBC BLD AUTO: SIGNIFICANT CHANGE UP
SODIUM SERPL-SCNC: 139 MMOL/L — SIGNIFICANT CHANGE UP (ref 135–145)
TACROLIMUS SERPL-MCNC: 4.7 NG/ML — SIGNIFICANT CHANGE UP
WBC # BLD: 3.45 K/UL — LOW (ref 3.8–10.5)
WBC # FLD AUTO: 3.45 K/UL — LOW (ref 3.8–10.5)

## 2023-08-12 PROCEDURE — 36514 APHERESIS PLASMA: CPT

## 2023-08-12 PROCEDURE — 99232 SBSQ HOSP IP/OBS MODERATE 35: CPT

## 2023-08-12 RX ORDER — TACROLIMUS 5 MG/1
2 CAPSULE ORAL ONCE
Refills: 0 | Status: COMPLETED | OUTPATIENT
Start: 2023-08-12 | End: 2023-08-12

## 2023-08-12 RX ORDER — IMMUNE GLOBULIN (HUMAN) 10 G/100ML
9 INJECTION INTRAVENOUS; SUBCUTANEOUS ONCE
Refills: 0 | Status: COMPLETED | OUTPATIENT
Start: 2023-08-12 | End: 2023-08-12

## 2023-08-12 RX ORDER — DIPHENHYDRAMINE HCL 50 MG
25 CAPSULE ORAL ONCE
Refills: 0 | Status: COMPLETED | OUTPATIENT
Start: 2023-08-12 | End: 2023-08-12

## 2023-08-12 RX ORDER — TACROLIMUS 5 MG/1
6 CAPSULE ORAL
Refills: 0 | Status: DISCONTINUED | OUTPATIENT
Start: 2023-08-13 | End: 2023-08-19

## 2023-08-12 RX ORDER — MAGNESIUM SULFATE 500 MG/ML
2 VIAL (ML) INJECTION ONCE
Refills: 0 | Status: COMPLETED | OUTPATIENT
Start: 2023-08-12 | End: 2023-08-12

## 2023-08-12 RX ORDER — ACETAMINOPHEN 500 MG
650 TABLET ORAL ONCE
Refills: 0 | Status: COMPLETED | OUTPATIENT
Start: 2023-08-12 | End: 2023-08-12

## 2023-08-12 RX ADMIN — SEVELAMER CARBONATE 800 MILLIGRAM(S): 2400 POWDER, FOR SUSPENSION ORAL at 17:53

## 2023-08-12 RX ADMIN — Medication 100 MILLIGRAM(S): at 12:26

## 2023-08-12 RX ADMIN — Medication 25 GRAM(S): at 12:26

## 2023-08-12 RX ADMIN — Medication 1 TABLET(S): at 12:32

## 2023-08-12 RX ADMIN — Medication 25 MILLIGRAM(S): at 10:05

## 2023-08-12 RX ADMIN — MYCOPHENOLATE MOFETIL 1000 MILLIGRAM(S): 250 CAPSULE ORAL at 21:13

## 2023-08-12 RX ADMIN — MYCOPHENOLATE MOFETIL 1000 MILLIGRAM(S): 250 CAPSULE ORAL at 09:23

## 2023-08-12 RX ADMIN — Medication 500000 UNIT(S): at 12:32

## 2023-08-12 RX ADMIN — Medication 100 MILLIGRAM(S): at 21:21

## 2023-08-12 RX ADMIN — Medication 10 UNIT(S): at 17:51

## 2023-08-12 RX ADMIN — Medication 5 MILLIGRAM(S): at 21:14

## 2023-08-12 RX ADMIN — FAMOTIDINE 20 MILLIGRAM(S): 10 INJECTION INTRAVENOUS at 12:32

## 2023-08-12 RX ADMIN — POLYETHYLENE GLYCOL 3350 17 GRAM(S): 17 POWDER, FOR SOLUTION ORAL at 17:53

## 2023-08-12 RX ADMIN — INSULIN GLARGINE 27 UNIT(S): 100 INJECTION, SOLUTION SUBCUTANEOUS at 21:39

## 2023-08-12 RX ADMIN — Medication 2: at 09:23

## 2023-08-12 RX ADMIN — Medication 500000 UNIT(S): at 06:29

## 2023-08-12 RX ADMIN — SEVELAMER CARBONATE 800 MILLIGRAM(S): 2400 POWDER, FOR SUSPENSION ORAL at 09:23

## 2023-08-12 RX ADMIN — Medication 81 MILLIGRAM(S): at 12:32

## 2023-08-12 RX ADMIN — ATORVASTATIN CALCIUM 40 MILLIGRAM(S): 80 TABLET, FILM COATED ORAL at 21:14

## 2023-08-12 RX ADMIN — Medication 650 MILLIGRAM(S): at 10:05

## 2023-08-12 RX ADMIN — Medication 80 MILLIGRAM(S): at 13:12

## 2023-08-12 RX ADMIN — POLYETHYLENE GLYCOL 3350 17 GRAM(S): 17 POWDER, FOR SOLUTION ORAL at 06:31

## 2023-08-12 RX ADMIN — IMMUNE GLOBULIN (HUMAN) 15 GRAM(S): 10 INJECTION INTRAVENOUS; SUBCUTANEOUS at 15:36

## 2023-08-12 RX ADMIN — TACROLIMUS 2 MILLIGRAM(S): 5 CAPSULE ORAL at 14:58

## 2023-08-12 RX ADMIN — Medication 100 MILLIGRAM(S): at 01:29

## 2023-08-12 RX ADMIN — Medication 4: at 13:15

## 2023-08-12 RX ADMIN — Medication 2: at 17:51

## 2023-08-12 RX ADMIN — Medication 80 MILLIGRAM(S): at 06:29

## 2023-08-12 RX ADMIN — SEVELAMER CARBONATE 800 MILLIGRAM(S): 2400 POWDER, FOR SUSPENSION ORAL at 13:12

## 2023-08-12 RX ADMIN — Medication 10 UNIT(S): at 13:14

## 2023-08-12 RX ADMIN — Medication 60 MILLIGRAM(S): at 12:29

## 2023-08-12 RX ADMIN — Medication 2: at 21:42

## 2023-08-12 RX ADMIN — Medication 10 UNIT(S): at 09:23

## 2023-08-12 RX ADMIN — SENNA PLUS 2 TABLET(S): 8.6 TABLET ORAL at 21:13

## 2023-08-12 RX ADMIN — Medication 500000 UNIT(S): at 17:53

## 2023-08-12 RX ADMIN — TACROLIMUS 4 MILLIGRAM(S): 5 CAPSULE ORAL at 09:23

## 2023-08-12 NOTE — PROGRESS NOTE ADULT - SUBJECTIVE AND OBJECTIVE BOX
--------------------------------------------------------------------------------  Chief Complaint: No new symptoms      24 hour events/subjective:      Reviewed  PAST HISTORY  --------------------------------------------------------------------------------  No significant changes to PMH, PSH, FHx, SHx, unless otherwise noted    ALLERGIES & MEDICATIONS  --------------------------------------------------------------------------------  Allergies    Contrast. (Unknown)    Intolerances      Standing Inpatient Medications  aspirin enteric coated 81 milliGRAM(s) Oral daily  atorvastatin 40 milliGRAM(s) Oral at bedtime  bisacodyl 5 milliGRAM(s) Oral at bedtime  ceFAZolin   IVPB 1000 milliGRAM(s) IV Intermittent every 8 hours  dextrose 5%. 1000 milliLiter(s) IV Continuous <Continuous>  dextrose 5%. 1000 milliLiter(s) IV Continuous <Continuous>  dextrose 50% Injectable 25 Gram(s) IV Push once  dextrose 50% Injectable 12.5 Gram(s) IV Push once  dextrose 50% Injectable 25 Gram(s) IV Push once  famotidine    Tablet 20 milliGRAM(s) Oral daily  furosemide    Tablet 80 milliGRAM(s) Oral two times a day  glucagon  Injectable 1 milliGRAM(s) IntraMuscular once  immune   globulin 10% (GAMMAGARD) IVPB 9 Gram(s) IV Intermittent once  insulin glargine Injectable (LANTUS) 27 Unit(s) SubCutaneous at bedtime  insulin lispro (ADMELOG) corrective regimen sliding scale   SubCutaneous at bedtime  insulin lispro (ADMELOG) corrective regimen sliding scale   SubCutaneous three times a day before meals  insulin lispro Injectable (ADMELOG) 10 Unit(s) SubCutaneous three times a day before meals  mycophenolate mofetil 1000 milliGRAM(s) Oral two times a day  nystatin    Suspension 682455 Unit(s) Oral four times a day  polyethylene glycol 3350 17 Gram(s) Oral two times a day  senna 2 Tablet(s) Oral at bedtime  sevelamer carbonate 800 milliGRAM(s) Oral three times a day with meals  tacrolimus ER Tablet (ENVARSUS XR) 4 milliGRAM(s) Oral <User Schedule>  trimethoprim   80 mG/sulfamethoxazole 400 mG 1 Tablet(s) Oral daily  valGANciclovir 450 milliGRAM(s) Oral <User Schedule>    PRN Inpatient Medications  dextrose Oral Gel 15 Gram(s) Oral once PRN      REVIEW OF SYSTEMS  --------------------------------------------------------------------------------  Gen: fatigue, fevers/chills, weakness  Skin: No rashes  Head/Eyes/Ears/Mouth: No headache;No sore throat  Respiratory: No dyspnea, cough,   CV: No chest pain, PND, orthopnea  GI: No abdominal pain, diarrhea, constipation, nausea, vomiting  Transplant: No pain  : No increased frequency, dysuria, hematuria, nocturia  MSK: No joint pain/swelling; no back pain; no edema  Neuro: No dizziness/lightheadedness, weakness, seizures, numbness, tingling  Psych: No significant nervousness, anxiety, stress, depression    All other systems were reviewed and are negative, except as noted.    VITALS/PHYSICAL EXAM  --------------------------------------------------------------------------------  T(C): 37.1 (08-12-23 @ 13:00), Max: 37.1 (08-11-23 @ 22:05)  HR: 89 (08-12-23 @ 13:00) (71 - 94)  BP: 104/63 (08-12-23 @ 13:00) (97/47 - 132/78)  RR: 18 (08-12-23 @ 13:00) (18 - 19)  SpO2: 98% (08-12-23 @ 13:00) (95% - 100%)       Weight (kg): 91.7 (08-11-23 @ 09:00)      08-11-23 @ 07:01  -  08-12-23 @ 07:00  --------------------------------------------------------  IN: 740 mL / OUT: 1570 mL / NET: -830 mL      Physical Exam:  	Gen: NAD, well-appearing  	HEENT: PERRL, supple neck, clear oropharynx  	Pulm: CTA B/L  	CV: RRR, S1S2; no rub  	Abd: +BS, soft, nontender/nondistended                      Transplant: No tenderness,   	: No suprapubic tenderness  	UE: Warm, FROM, intact strength; no edema; no asterixis  	LE: Warm, FROM, intact strength; no edema  	Neuro: No focal deficits, intact gait  	Psych: Normal affect and mood  	Skin: Warm, without rashes      LABS/STUDIES  --------------------------------------------------------------------------------              7.5    3.45  >-----------<  164      [08-12-23 @ 06:57]              23.6     139  |  101  |  58  ----------------------------<  206      [08-12-23 @ 06:55]  3.8   |  24  |  2.54        Ca     9.6     [08-12-23 @ 06:55]      iCa    1.30     [08-12 @ 06:55]      Mg     1.7     [08-12-23 @ 06:55]      Phos  2.6     [08-12-23 @ 06:55]      PT/INR: PT 11.5 , INR 1.05       [08-12-23 @ 06:58]  PTT: 24.9       [08-12-23 @ 06:58]      Creatinine Trend:  SCr 2.54 [08-12 @ 06:55]  SCr 3.35 [08-11 @ 07:21]  SCr 3.72 [08-10 @ 07:38]  SCr 3.45 [08-09 @ 06:41]  SCr 3.43 [08-08 @ 07:42]    Tacrolimus (), Serum: 4.7 ng/mL (08-12 @ 06:57)  Tacrolimus (), Serum: 6.2 ng/mL (08-11 @ 07:29)  Tacrolimus (), Serum: 7.2 ng/mL (08-10 @ 07:40)  Tacrolimus (), Serum: 6.9 ng/mL (08-09 @ 06:38)    Urinalysis - [08-12-23 @ 06:55]      Color  / Appearance  / SG  / pH       Gluc 206 / Ketone   / Bili  / Urobili        Blood  / Protein  / Leuk Est  / Nitrite       RBC  / WBC  / Hyaline  / Gran  / Sq Epi  / Non Sq Epi  / Bacteria         HBsAb 5.7      [07-15-23 @ 21:22]  HBsAg Nonreact      [07-15-23 @ 21:21]  HBcAb Nonreact      [07-15-23 @ 21:22]  HCV 0.16, Nonreact      [07-15-23 @ 21:22]  HIV Nonreact      [07-15-23 @ 21:21]

## 2023-08-12 NOTE — PROGRESS NOTE ADULT - SUBJECTIVE AND OBJECTIVE BOX
Subjective: Patient seen and examined. No new events except as noted.     SUBJECTIVE/ROS:  No chest pain, dyspnea, palpitation, or dizziness.       MEDICATIONS:  MEDICATIONS  (STANDING):  aspirin enteric coated 81 milliGRAM(s) Oral daily  atorvastatin 40 milliGRAM(s) Oral at bedtime  bisacodyl 5 milliGRAM(s) Oral at bedtime  ceFAZolin   IVPB 1000 milliGRAM(s) IV Intermittent every 8 hours  dextrose 5%. 1000 milliLiter(s) (50 mL/Hr) IV Continuous <Continuous>  dextrose 5%. 1000 milliLiter(s) (100 mL/Hr) IV Continuous <Continuous>  dextrose 50% Injectable 25 Gram(s) IV Push once  dextrose 50% Injectable 12.5 Gram(s) IV Push once  dextrose 50% Injectable 25 Gram(s) IV Push once  famotidine    Tablet 20 milliGRAM(s) Oral daily  furosemide    Tablet 80 milliGRAM(s) Oral two times a day  glucagon  Injectable 1 milliGRAM(s) IntraMuscular once  insulin glargine Injectable (LANTUS) 27 Unit(s) SubCutaneous at bedtime  insulin lispro (ADMELOG) corrective regimen sliding scale   SubCutaneous at bedtime  insulin lispro (ADMELOG) corrective regimen sliding scale   SubCutaneous three times a day before meals  insulin lispro Injectable (ADMELOG) 10 Unit(s) SubCutaneous three times a day before meals  mycophenolate mofetil 1000 milliGRAM(s) Oral two times a day  nystatin    Suspension 918237 Unit(s) Oral four times a day  polyethylene glycol 3350 17 Gram(s) Oral two times a day  senna 2 Tablet(s) Oral at bedtime  sevelamer carbonate 800 milliGRAM(s) Oral three times a day with meals  tacrolimus ER Tablet (ENVARSUS XR) 4 milliGRAM(s) Oral <User Schedule>  trimethoprim   80 mG/sulfamethoxazole 400 mG 1 Tablet(s) Oral daily  valGANciclovir 450 milliGRAM(s) Oral <User Schedule>      PHYSICAL EXAM:  T(C): 36.8 (08-12-23 @ 05:00), Max: 37.1 (08-11-23 @ 22:05)  HR: 90 (08-12-23 @ 05:00) (71 - 94)  BP: 125/84 (08-12-23 @ 05:00) (97/47 - 132/60)  RR: 18 (08-12-23 @ 05:00) (18 - 19)  SpO2: 98% (08-12-23 @ 05:00) (95% - 100%)  Wt(kg): --  I&O's Summary    11 Aug 2023 07:01  -  12 Aug 2023 07:00  --------------------------------------------------------  IN: 740 mL / OUT: 1570 mL / NET: -830 mL        Weight (kg): 91.7 (08-11 @ 09:00)      JVP: Normal  Neck: supple  Lung: clear   CV: S1 S2 , Murmur:  Abd: soft  Ext: No edema  neuro: Awake / alert  Psych: flat affect  Skin: normal``    LABS/DATA:    CARDIAC MARKERS:                                7.5    3.45  )-----------( 164      ( 12 Aug 2023 06:57 )             23.6     08-12    139  |  101  |  58<H>  ----------------------------<  206<H>  3.8   |  24  |  2.54<H>    Ca    9.6      12 Aug 2023 06:55  Phos  2.6     08-12  Mg     1.7     08-12      proBNP:   Lipid Profile:   HgA1c:   TSH:     TELE:  EKG:

## 2023-08-12 NOTE — PROGRESS NOTE ADULT - SUBJECTIVE AND OBJECTIVE BOX
Transplant Surgery - Multidisciplinary Progress Note  --------------------------------------------------------------  DDRT 7/16/2023    Present:   Patient seen and examined with multidisciplinary Transplant team including  Surgeon: Dr. Vizcaino, SHERIE Brock,  Nephrologist: Dr. Haisrton and unit RN during am rounds.  Disciplines not in attendance will be notified of the plan.     HPI: Tee Salvador is a 52 y/o M with past medical history significant for HTN, CHF (s/p AICD 2016--last interrogated 7/15/23), CAD (s/p CABG 2015), IDDM, PVD s/p stent x4 in RLE with R big toe/second toe amputation (2021) and ESRD on HD via LUE AVF for 6 years now s/p DDRT 7/16/2023 with Thymoglobulin induction. Post-transplant course complicated by severe constipation requiring disimpaction, DGF requiring hemodialysis and LUE swelling with concern for L subclavian thrombus, now s/p IR fistulogram 7/7/23 with balloon angioplasty of subclavian vein. He was discharged home on 7/27/23 and remained on HD MWF at home center [facility]. He presents from home via EMS to [facility] on 8/7/23 with constipation and continued DGF    Interval Events:  Afebrile, VSS on ABX  improving graft function daily  bx with concern for AMR, tolerating PLEX/IVIG/steroids  no complaints at this time    Immunosuppression:  Envarsus per level  MMF 1/1  pulse steroids with taper    Potential Discharge date: TBD    Education:  Medications    Plan of care:  See Below        MEDICATIONS  (STANDING):  aspirin enteric coated 81 milliGRAM(s) Oral daily  atorvastatin 40 milliGRAM(s) Oral at bedtime  bisacodyl 5 milliGRAM(s) Oral at bedtime  ceFAZolin   IVPB 1000 milliGRAM(s) IV Intermittent every 8 hours  dextrose 5%. 1000 milliLiter(s) (50 mL/Hr) IV Continuous <Continuous>  dextrose 5%. 1000 milliLiter(s) (100 mL/Hr) IV Continuous <Continuous>  dextrose 50% Injectable 25 Gram(s) IV Push once  dextrose 50% Injectable 12.5 Gram(s) IV Push once  dextrose 50% Injectable 25 Gram(s) IV Push once  famotidine    Tablet 20 milliGRAM(s) Oral daily  furosemide    Tablet 80 milliGRAM(s) Oral two times a day  glucagon  Injectable 1 milliGRAM(s) IntraMuscular once  insulin glargine Injectable (LANTUS) 27 Unit(s) SubCutaneous at bedtime  insulin lispro (ADMELOG) corrective regimen sliding scale   SubCutaneous three times a day before meals  insulin lispro (ADMELOG) corrective regimen sliding scale   SubCutaneous at bedtime  insulin lispro Injectable (ADMELOG) 10 Unit(s) SubCutaneous three times a day before meals  mycophenolate mofetil 1000 milliGRAM(s) Oral two times a day  nystatin    Suspension 218971 Unit(s) Oral four times a day  polyethylene glycol 3350 17 Gram(s) Oral two times a day  senna 2 Tablet(s) Oral at bedtime  sevelamer carbonate 800 milliGRAM(s) Oral three times a day with meals  tacrolimus ER Tablet (ENVARSUS XR) 4 milliGRAM(s) Oral <User Schedule>  trimethoprim   80 mG/sulfamethoxazole 400 mG 1 Tablet(s) Oral daily  valGANciclovir 450 milliGRAM(s) Oral <User Schedule>    MEDICATIONS  (PRN):  dextrose Oral Gel 15 Gram(s) Oral once PRN Blood Glucose LESS THAN 70 milliGRAM(s)/deciliter      PAST MEDICAL & SURGICAL HISTORY:  Diabetes mellitus  type 2      Foot ulcer due to secondary DM      Coronary artery disease      Acute on chronic systolic heart failure      H/O kidney transplant      Breast Reduction  at age 18      Toe amputation status, left      S/P CABG (coronary artery bypass graft)      AICD (automatic cardioverter/defibrillator) present          Vital Signs Last 24 Hrs  T(C): 36.8 (12 Aug 2023 05:00), Max: 37.1 (11 Aug 2023 22:05)  T(F): 98.3 (12 Aug 2023 05:00), Max: 98.8 (11 Aug 2023 23:50)  HR: 90 (12 Aug 2023 05:00) (71 - 94)  BP: 125/84 (12 Aug 2023 05:00) (97/47 - 132/60)  BP(mean): --  RR: 18 (12 Aug 2023 05:00) (18 - 19)  SpO2: 98% (12 Aug 2023 05:00) (95% - 100%)    Parameters below as of 12 Aug 2023 05:00  Patient On (Oxygen Delivery Method): room air        I&O's Summary    11 Aug 2023 07:01  -  12 Aug 2023 07:00  --------------------------------------------------------  IN: 740 mL / OUT: 1570 mL / NET: -830 mL                              7.5    3.45  )-----------( 164      ( 12 Aug 2023 06:57 )             23.6     08-12    139  |  101  |  58<H>  ----------------------------<  206<H>  3.8   |  24  |  2.54<H>    Ca    9.6      12 Aug 2023 06:55  Phos  2.6     08-12  Mg     1.7     08-12      Tacrolimus (), Serum: 6.2 ng/mL (08-11 @ 07:29)        Culture - Blood (collected 08-08-23 @ 07:49)  Source: .Blood Blood-Peripheral  Preliminary Report (08-11-23 @ 11:01):    No growth at 72 Hours    Culture - Blood (collected 08-08-23 @ 07:49)  Source: .Blood Blood-Peripheral  Preliminary Report (08-11-23 @ 11:01):    No growth at 72 Hours    Culture - Urine (collected 08-07-23 @ 05:21)  Source: Clean Catch Clean Catch (Midstream)  Final Report (08-10-23 @ 12:05):    50,000 - 99,000 CFU/mL Klebsiella pneumoniae  Organism: Klebsiella pneumoniae (08-10-23 @ 12:05)  Organism: Klebsiella pneumoniae (08-10-23 @ 12:05)                  Review of systems  Gen: No weight changes, fatigue, fevers/chills, weakness  Skin: No rashes  Head/Eyes/Ears/Mouth: No headache; Normal hearing; Normal vision w/o blurriness; No sinus pain/discomfort, sore throat  Respiratory: No dyspnea, cough, wheezing, hemoptysis  CV: No chest pain, PND, orthopnea  GI: no abdominal pain, denies diarrhea, constipation, nausea, vomiting, melena, hematochezia  : No increased frequency, dysuria, hematuria, nocturia  MSK: No joint pain/swelling; no back pain; no edema  Neuro: No dizziness/lightheadedness, weakness, seizures, numbness, tingling  Heme: No easy bruising or bleeding  Endo: No heat/cold intolerance  Psych: No significant nervousness, anxiety, stress, depression  All other systems were reviewed and are negative, except as noted.      PHYSICAL EXAM:  Constitutional: Well developed / well nourished  Eyes: Anicteric, PERRLA  ENMT: nc/at  Neck: supple  Respiratory: CTA B/L  Cardiovascular: RRR  Gastrointestinal: Soft, ND/NT.  well healed incisional scar. LIANA serous  Genitourinary: Voiding spontaneously  Extremities: SCD's in place and working bilaterally  Vascular: Palpable dp pulses bilaterally  Neurological: A&O x3  Skin: no rashes, ulcerations or lesions;  Musculoskeletal: Moving all extremities  Psychiatric: Responsive

## 2023-08-12 NOTE — PROGRESS NOTE ADULT - SUBJECTIVE AND OBJECTIVE BOX
DATE OF SERVICE: 08-12-23 @ 13:05  CHIEF COMPLAINT:Patient is a 53y old  Male who presents with a chief complaint of UTI, fecal impaction (12 Aug 2023 10:39)    	        PAST MEDICAL & SURGICAL HISTORY:  Diabetes mellitus  type 2      Foot ulcer due to secondary DM      Coronary artery disease      Acute on chronic systolic heart failure      H/O kidney transplant      Breast Reduction  at age 18      Toe amputation status, left      S/P CABG (coronary artery bypass graft)      AICD (automatic cardioverter/defibrillator) present              RESPIRATORY: No cough, wheezing, chills or hemoptysis; No Shortness of Breath  CARDIOVASCULAR: No chest pain, palpitations, passing out,   GASTROINTESTINAL: No abdominal or epigastric pain. No nausea, vomiting, or hematemesis;   GENITOURINARY: No dysuria, frequency, hematuria,  NEUROLOGICAL: No headaches,    Medications:  MEDICATIONS  (STANDING):  aspirin enteric coated 81 milliGRAM(s) Oral daily  atorvastatin 40 milliGRAM(s) Oral at bedtime  bisacodyl 5 milliGRAM(s) Oral at bedtime  ceFAZolin   IVPB 1000 milliGRAM(s) IV Intermittent every 8 hours  dextrose 5%. 1000 milliLiter(s) (50 mL/Hr) IV Continuous <Continuous>  dextrose 5%. 1000 milliLiter(s) (100 mL/Hr) IV Continuous <Continuous>  dextrose 50% Injectable 25 Gram(s) IV Push once  dextrose 50% Injectable 12.5 Gram(s) IV Push once  dextrose 50% Injectable 25 Gram(s) IV Push once  famotidine    Tablet 20 milliGRAM(s) Oral daily  furosemide    Tablet 80 milliGRAM(s) Oral two times a day  glucagon  Injectable 1 milliGRAM(s) IntraMuscular once  immune   globulin 10% (GAMMAGARD) IVPB 9 Gram(s) IV Intermittent once  insulin glargine Injectable (LANTUS) 27 Unit(s) SubCutaneous at bedtime  insulin lispro (ADMELOG) corrective regimen sliding scale   SubCutaneous at bedtime  insulin lispro (ADMELOG) corrective regimen sliding scale   SubCutaneous three times a day before meals  insulin lispro Injectable (ADMELOG) 10 Unit(s) SubCutaneous three times a day before meals  mycophenolate mofetil 1000 milliGRAM(s) Oral two times a day  nystatin    Suspension 677216 Unit(s) Oral four times a day  polyethylene glycol 3350 17 Gram(s) Oral two times a day  senna 2 Tablet(s) Oral at bedtime  sevelamer carbonate 800 milliGRAM(s) Oral three times a day with meals  tacrolimus ER Tablet (ENVARSUS XR) 4 milliGRAM(s) Oral <User Schedule>  trimethoprim   80 mG/sulfamethoxazole 400 mG 1 Tablet(s) Oral daily  valGANciclovir 450 milliGRAM(s) Oral <User Schedule>    MEDICATIONS  (PRN):  dextrose Oral Gel 15 Gram(s) Oral once PRN Blood Glucose LESS THAN 70 milliGRAM(s)/deciliter    	    PHYSICAL EXAM:  T(C): 37.1 (08-12-23 @ 13:00), Max: 37.1 (08-11-23 @ 22:05)  HR: 89 (08-12-23 @ 13:00) (71 - 94)  BP: 104/63 (08-12-23 @ 13:00) (97/47 - 132/78)  RR: 18 (08-12-23 @ 13:00) (18 - 19)  SpO2: 98% (08-12-23 @ 13:00) (95% - 100%)  Wt(kg): --  I&O's Summary    11 Aug 2023 07:01  -  12 Aug 2023 07:00  --------------------------------------------------------  IN: 740 mL / OUT: 1570 mL / NET: -830 mL        Appearance: Normal	  HEENT:   Normal oral mucosa, PERRL, EOMI	  Lymphatic: No lymphadenopathy  Cardiovascular: Normal S1 S2, No JVD,   Respiratory: Lungs clear to auscultation	  Psychiatry: A & O  Gastrointestinal:  Soft, Non-tender, + BS	  Neurologic: Non-focal  Extremities: Normal range of motion,  TELEMETRY: 	    ECG:  	  RADIOLOGY:  OTHER: 	  	  LABS:	 	    CARDIAC MARKERS:                                7.5    3.45  )-----------( 164      ( 12 Aug 2023 06:57 )             23.6     08-12    139  |  101  |  58<H>  ----------------------------<  206<H>  3.8   |  24  |  2.54<H>    Ca    9.6      12 Aug 2023 06:55  Phos  2.6     08-12  Mg     1.7     08-12      proBNP:   Lipid Profile:   HgA1c:   TSH:

## 2023-08-12 NOTE — PROGRESS NOTE ADULT - SUBJECTIVE AND OBJECTIVE BOX
[facility]   MD DANIEL WALDEN MD RUORU WONG, PA    TEL:  FROM 9 AM to 5 PM ---OFFICE: [phone]    FROM 5 PM - 9 AM PLEASE CALL ANSWERING SERVICE: [phone]    RENAL FOLLOW UP NOTE--Date of Service 08-12-23 @ 07:51  --------------------------------------------------------------------------------  HPI:      Pt seen and examined at bedside.   Aramisies SOB, chest pain     PAST HISTORY  --------------------------------------------------------------------------------  No significant changes to PMH, PSH, FHx, SHx, unless otherwise noted    ALLERGIES & MEDICATIONS  --------------------------------------------------------------------------------  Allergies    Contrast. (Unknown)    Intolerances      Standing Inpatient Medications  aspirin enteric coated 81 milliGRAM(s) Oral daily  atorvastatin 40 milliGRAM(s) Oral at bedtime  bisacodyl 5 milliGRAM(s) Oral at bedtime  ceFAZolin   IVPB 1000 milliGRAM(s) IV Intermittent every 8 hours  dextrose 5%. 1000 milliLiter(s) IV Continuous <Continuous>  dextrose 5%. 1000 milliLiter(s) IV Continuous <Continuous>  dextrose 50% Injectable 12.5 Gram(s) IV Push once  dextrose 50% Injectable 25 Gram(s) IV Push once  dextrose 50% Injectable 25 Gram(s) IV Push once  famotidine    Tablet 20 milliGRAM(s) Oral daily  furosemide    Tablet 80 milliGRAM(s) Oral two times a day  glucagon  Injectable 1 milliGRAM(s) IntraMuscular once  insulin glargine Injectable (LANTUS) 27 Unit(s) SubCutaneous at bedtime  insulin lispro (ADMELOG) corrective regimen sliding scale   SubCutaneous three times a day before meals  insulin lispro (ADMELOG) corrective regimen sliding scale   SubCutaneous at bedtime  insulin lispro Injectable (ADMELOG) 10 Unit(s) SubCutaneous three times a day before meals  mycophenolate mofetil 1000 milliGRAM(s) Oral two times a day  nystatin    Suspension 094097 Unit(s) Oral four times a day  polyethylene glycol 3350 17 Gram(s) Oral two times a day  senna 2 Tablet(s) Oral at bedtime  sevelamer carbonate 800 milliGRAM(s) Oral three times a day with meals  tacrolimus ER Tablet (ENVARSUS XR) 4 milliGRAM(s) Oral <User Schedule>  trimethoprim   80 mG/sulfamethoxazole 400 mG 1 Tablet(s) Oral daily  valGANciclovir 450 milliGRAM(s) Oral <User Schedule>    PRN Inpatient Medications  dextrose Oral Gel 15 Gram(s) Oral once PRN      REVIEW OF SYSTEMS  --------------------------------------------------------------------------------  General: no fever  MSK: no edema     VITALS/PHYSICAL EXAM  --------------------------------------------------------------------------------  T(C): 36.8 (08-12-23 @ 05:00), Max: 37.1 (08-11-23 @ 22:05)  HR: 90 (08-12-23 @ 05:00) (71 - 94)  BP: 125/84 (08-12-23 @ 05:00) (97/47 - 132/60)  RR: 18 (08-12-23 @ 05:00) (18 - 19)  SpO2: 98% (08-12-23 @ 05:00) (95% - 100%)  Wt(kg): --    Weight (kg): 91.7 (08-11-23 @ 09:00)      08-11-23 @ 07:01  -  08-12-23 @ 07:00  --------------------------------------------------------  IN: 740 mL / OUT: 1570 mL / NET: -830 mL      Physical Exam:  	Gen: NAD  	HEENT: MMM  	Pulm: CTA B/L  	CV: S1S2  	Abd: Soft, +BS  	Ext: No LE edema B/L                      Neuro: Awake   	Skin: Warm and Dry   	Vascular access: NO HD catheter            no  granda  LABS/STUDIES  --------------------------------------------------------------------------------              7.5    3.45  >-----------<  164      [08-12-23 @ 06:57]              23.6     139  |  101  |  58  ----------------------------<  206      [08-12-23 @ 06:55]  3.8   |  24  |  2.54        Ca     9.6     [08-12-23 @ 06:55]      iCa    1.30     [08-12 @ 06:55]      Mg     1.7     [08-12-23 @ 06:55]      Phos  2.6     [08-12-23 @ 06:55]      PT/INR: PT 11.5 , INR 1.05       [08-12-23 @ 06:58]  PTT: 24.9       [08-12-23 @ 06:58]      Creatinine Trend:  SCr 2.54 [08-12 @ 06:55]  SCr 3.35 [08-11 @ 07:21]  SCr 3.72 [08-10 @ 07:38]  SCr 3.45 [08-09 @ 06:41]  SCr 3.43 [08-08 @ 07:42]    Urinalysis - [08-12-23 @ 06:55]      Color  / Appearance  / SG  / pH       Gluc 206 / Ketone   / Bili  / Urobili        Blood  / Protein  / Leuk Est  / Nitrite       RBC  / WBC  / Hyaline  / Gran  / Sq Epi  / Non Sq Epi  / Bacteria         HBsAb 5.7      [07-15-23 @ 21:22]  HBsAg Nonreact      [07-15-23 @ 21:21]  HBcAb Nonreact      [07-15-23 @ 21:22]  HCV 0.16, Nonreact      [07-15-23 @ 21:22]  HIV Nonreact      [07-15-23 @ 21:21]

## 2023-08-12 NOTE — CHART NOTE - NSCHARTNOTEFT_GEN_A_CORE
54 y/o M with past medical history significant for HTN, CHF (s/p AICD 2016--last interrogated 7/15/23), CAD (s/p CABG 2015), IDDM, PVD s/p stent x4 in RLE with R big toe/second toe amputation (2021) and ESRD on HD via LUE AVF for 6 years now s/p DDRT 7/16/2023. Kidney biopsy on 8/10 concerned for AMR and diabetic nephrosclerosis. BB is contacted to initiate PLEX. A course of 3 PLEX planned for now (8/11,8/12,8/14).    8/11/23 PLEX #1, 1 plasma volume with half donor plasma and half 5% albumin as replacement fluid. PLEX#1 completed with no issues.  8/12/23 PLEX #2, 1 plasma volume with half donor plasma and half 5% albumin as replacement fluid. PLEX#2 completed with no issues.    PLEX #3 is scheduled for Monday 8/14/23.

## 2023-08-12 NOTE — PROGRESS NOTE ADULT - ASSESSMENT
54 yo M with hx of DM2 CAD, CHF, s/p CABG 2015, AICD (2016), on hemodialysis for approximately 6 years via L AVF (nephrologist Dr. Bharath Romo), He has hx of PVD, is s/p 4 stents in R leg (mid and distal right superficial femoral artery, right popliteal x2 on 1/14/22).; is sp RT big toe and second toe amputation 2021, on asa and plavix for severe PVD (per pt). Now here for possible DDRT. Pt reports does not make urine only few drops occasionally. Pt denies N/V/D, fevers/chills, CP, SOB. Pt reports last ate at 4pm today and had HD yesterday 7/14/23.  Nephrology consulted for ESRD s/p DDRT.      s/p DDRT @ [facility] 7/16/2023   Initially received Simulect induction -- Changed to Thymoglobulin given delayed graft function.  Pt discharged on previous admission on HD.   Post transport course significant for DGF  Via L AVF --> s/p Left Radiocephalic fistulogram and subclavian vein angioplasty with Vascular 7/24/2023  Consent obtained from patient and placed in chart.  s/p CT A& P No contrast 8/7/23 --> partially imaged L pleural effusion, unchanged. Partially  imaged LLL opacity again seen.  small amount of dependent air within urinary bladder, small renal cysts, left transplant kidney with mil hydro. Stent extends from kidney to bladder.   s/p Doppler Renal US Transplant kidney 8/7/23 --> patent renal vasculature. Elevated renal artery peak systolic velocities. Concern for possible renal artery stenosis at anastomosis. Mild elevation  of intrarenal resistive indices , grossly unchanged from prior exam.   Continue Immunosuppression per transplant : Envarsus 4 mg qd, Cellcept 1 gm BID, and prednisone 5 mg qd.   Tacro trough is 7.2 8/10  s/p Transplant renal biopsy 8/09 -- concern for AMR. per transplant ,  initiated on  PLEX/IVIG (3 sessions for now, while in house).  Monitor labs and urine output. Avoid nephrotoxins  HD per transplant, last HD 8/7    Dose medications as per eGFR.  Renal Diet   Monitor     Anemia  Mgmt per Transplant - planning for LUX with HD  Iron panel pending    Maintain Hgb > 8  Monitor     HTN:   BP optimal     Monitor     Proteinuria / Hematuria   likely in setting of + LE, bacteria  Urine cx + Klebsiella   Mgmt per transplant

## 2023-08-12 NOTE — PROGRESS NOTE ADULT - ASSESSMENT
54 y/o M with past medical history significant for HTN, CHF (s/p AICD 2016), CAD (s/p CABG 2015), IDDM, PVD s/p stent x4 in RLE with R big toe/second toe amputation (2021) and ESRD on HD via LUE AVF for 6 years now s/p DDRT 7/16/2023 with Thymoglobulin induction.   Post-transplant course complicated by severe constipation requiring disimpaction, DGF requiring hemodialysis and LUE swelling with concern for L subclavian thrombus, now s/p IR fistulogram 7/7/23 with balloon angioplasty of subclavian vein.   discharged home on 7/27/23 and remained on HD MWF   He presents from home via EMS to [facility] on 8/7/23 with abdominal discomfort and no bowel movement for 3 days.     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

## 2023-08-12 NOTE — PROGRESS NOTE ADULT - ASSESSMENT
Tee Salvador is a 54 y/o M with past medical history significant for HTN, CHF (s/p AICD 2016--last interrogated 7/15/23), CAD (s/p CABG 2015), IDDM, PVD s/p stent x4 in RLE with R big toe/second toe amputation (2021) and ESRD on HD via LUE AVF for 6 years now s/p DDRT 7/16/2023 with Thymoglobulin induction. Post-transplant course complicated by severe constipation requiring disimpaction, DGF requiring hemodialysis and LUE swelling with concern for L subclavian thrombus, now s/p IR fistulogram 7/7/23 with balloon angioplasty of subclavian vein. He was discharged home on 7/27/23 and remained on HD MWF at home center [facility]. He presents from home via EMS to [facility] on 8/7/23 with constipation and continued DGF    DGF s/p DDRT  -S/P IR biopsy on 8/10 with concern for AMR. will continue PLEX/IVIG (3 sessions for now, while in house)  -DSA+ : will continue pulse dose steroids (500 on 8/8, 250 on 8/9, 125 on 8/10, 60 today  -graft function improving  -last HD on 8.7, continue Lasix 80QD  -UTI: Klebsiella, CTX-->Ancef (total of 7D per ID)  -BCx negative  -LIANA to continue  -bowel regimen  -diet as tolerated  -SCDs  -last epogen 8/11, stable h/h. resume ASA    Immunosuppression  -Envarsus per level  -MMF 1/1  -steroids as above  -PPx regimen: Valcyte/Bactrim/Nystatin/PPI with renal dosing    DM  -adjust while on steroids

## 2023-08-13 LAB
ANION GAP SERPL CALC-SCNC: 14 MMOL/L — SIGNIFICANT CHANGE UP (ref 5–17)
APTT BLD: 25.2 SEC — SIGNIFICANT CHANGE UP (ref 24.5–35.6)
BASOPHILS # BLD AUTO: 0 K/UL — SIGNIFICANT CHANGE UP (ref 0–0.2)
BASOPHILS NFR BLD AUTO: 0 % — SIGNIFICANT CHANGE UP (ref 0–2)
BUN SERPL-MCNC: 51 MG/DL — HIGH (ref 7–23)
CA-I BLD-SCNC: 1.28 MMOL/L — SIGNIFICANT CHANGE UP (ref 1.15–1.33)
CALCIUM SERPL-MCNC: 9.4 MG/DL — SIGNIFICANT CHANGE UP (ref 8.4–10.5)
CHLORIDE SERPL-SCNC: 102 MMOL/L — SIGNIFICANT CHANGE UP (ref 96–108)
CO2 SERPL-SCNC: 22 MMOL/L — SIGNIFICANT CHANGE UP (ref 22–31)
CREAT SERPL-MCNC: 2.35 MG/DL — HIGH (ref 0.5–1.3)
CULTURE RESULTS: SIGNIFICANT CHANGE UP
CULTURE RESULTS: SIGNIFICANT CHANGE UP
EGFR: 32 ML/MIN/1.73M2 — LOW
EOSINOPHIL # BLD AUTO: 0 K/UL — SIGNIFICANT CHANGE UP (ref 0–0.5)
EOSINOPHIL NFR BLD AUTO: 0 % — SIGNIFICANT CHANGE UP (ref 0–6)
FIBRINOGEN PPP-MCNC: 188 MG/DL — LOW (ref 200–445)
GLUCOSE BLDC GLUCOMTR-MCNC: 105 MG/DL — HIGH (ref 70–99)
GLUCOSE BLDC GLUCOMTR-MCNC: 167 MG/DL — HIGH (ref 70–99)
GLUCOSE BLDC GLUCOMTR-MCNC: 180 MG/DL — HIGH (ref 70–99)
GLUCOSE BLDC GLUCOMTR-MCNC: 230 MG/DL — HIGH (ref 70–99)
GLUCOSE SERPL-MCNC: 262 MG/DL — HIGH (ref 70–99)
HCT VFR BLD CALC: 23.7 % — LOW (ref 39–50)
HGB BLD-MCNC: 7.7 G/DL — LOW (ref 13–17)
IMM GRANULOCYTES NFR BLD AUTO: 3.3 % — HIGH (ref 0–0.9)
INR BLD: 1.01 RATIO — SIGNIFICANT CHANGE UP (ref 0.85–1.18)
LYMPHOCYTES # BLD AUTO: 0.11 K/UL — LOW (ref 1–3.3)
LYMPHOCYTES # BLD AUTO: 3 % — LOW (ref 13–44)
MAGNESIUM SERPL-MCNC: 2 MG/DL — SIGNIFICANT CHANGE UP (ref 1.6–2.6)
MCHC RBC-ENTMCNC: 31.8 PG — SIGNIFICANT CHANGE UP (ref 27–34)
MCHC RBC-ENTMCNC: 32.5 GM/DL — SIGNIFICANT CHANGE UP (ref 32–36)
MCV RBC AUTO: 97.9 FL — SIGNIFICANT CHANGE UP (ref 80–100)
MONOCYTES # BLD AUTO: 0.31 K/UL — SIGNIFICANT CHANGE UP (ref 0–0.9)
MONOCYTES NFR BLD AUTO: 8.4 % — SIGNIFICANT CHANGE UP (ref 2–14)
NEUTROPHILS # BLD AUTO: 3.14 K/UL — SIGNIFICANT CHANGE UP (ref 1.8–7.4)
NEUTROPHILS NFR BLD AUTO: 85.3 % — HIGH (ref 43–77)
NRBC # BLD: 0 /100 WBCS — SIGNIFICANT CHANGE UP (ref 0–0)
PHOSPHATE SERPL-MCNC: 2 MG/DL — LOW (ref 2.5–4.5)
PLATELET # BLD AUTO: 170 K/UL — SIGNIFICANT CHANGE UP (ref 150–400)
POTASSIUM SERPL-MCNC: 3.7 MMOL/L — SIGNIFICANT CHANGE UP (ref 3.5–5.3)
POTASSIUM SERPL-SCNC: 3.7 MMOL/L — SIGNIFICANT CHANGE UP (ref 3.5–5.3)
PROTHROM AB SERPL-ACNC: 10.6 SEC — SIGNIFICANT CHANGE UP (ref 9.5–13)
RBC # BLD: 2.42 M/UL — LOW (ref 4.2–5.8)
RBC # FLD: 17.9 % — HIGH (ref 10.3–14.5)
SODIUM SERPL-SCNC: 138 MMOL/L — SIGNIFICANT CHANGE UP (ref 135–145)
SPECIMEN SOURCE: SIGNIFICANT CHANGE UP
SPECIMEN SOURCE: SIGNIFICANT CHANGE UP
TACROLIMUS SERPL-MCNC: 8.6 NG/ML — SIGNIFICANT CHANGE UP
WBC # BLD: 3.68 K/UL — LOW (ref 3.8–10.5)
WBC # FLD AUTO: 3.68 K/UL — LOW (ref 3.8–10.5)

## 2023-08-13 PROCEDURE — 99232 SBSQ HOSP IP/OBS MODERATE 35: CPT

## 2023-08-13 RX ORDER — INSULIN GLARGINE 100 [IU]/ML
20 INJECTION, SOLUTION SUBCUTANEOUS AT BEDTIME
Refills: 0 | Status: DISCONTINUED | OUTPATIENT
Start: 2023-08-13 | End: 2023-08-15

## 2023-08-13 RX ORDER — INSULIN LISPRO 100/ML
12 VIAL (ML) SUBCUTANEOUS
Refills: 0 | Status: DISCONTINUED | OUTPATIENT
Start: 2023-08-13 | End: 2023-08-15

## 2023-08-13 RX ORDER — SODIUM,POTASSIUM PHOSPHATES 278-250MG
1 POWDER IN PACKET (EA) ORAL ONCE
Refills: 0 | Status: COMPLETED | OUTPATIENT
Start: 2023-08-13 | End: 2023-08-13

## 2023-08-13 RX ORDER — INSULIN GLARGINE 100 [IU]/ML
30 INJECTION, SOLUTION SUBCUTANEOUS AT BEDTIME
Refills: 0 | Status: DISCONTINUED | OUTPATIENT
Start: 2023-08-13 | End: 2023-08-13

## 2023-08-13 RX ORDER — FUROSEMIDE 40 MG
80 TABLET ORAL DAILY
Refills: 0 | Status: DISCONTINUED | OUTPATIENT
Start: 2023-08-13 | End: 2023-08-14

## 2023-08-13 RX ADMIN — Medication 80 MILLIGRAM(S): at 06:17

## 2023-08-13 RX ADMIN — TACROLIMUS 6 MILLIGRAM(S): 5 CAPSULE ORAL at 09:21

## 2023-08-13 RX ADMIN — SENNA PLUS 2 TABLET(S): 8.6 TABLET ORAL at 21:23

## 2023-08-13 RX ADMIN — Medication 1 PACKET(S): at 11:08

## 2023-08-13 RX ADMIN — Medication 100 MILLIGRAM(S): at 18:02

## 2023-08-13 RX ADMIN — MYCOPHENOLATE MOFETIL 1000 MILLIGRAM(S): 250 CAPSULE ORAL at 09:22

## 2023-08-13 RX ADMIN — MYCOPHENOLATE MOFETIL 1000 MILLIGRAM(S): 250 CAPSULE ORAL at 20:03

## 2023-08-13 RX ADMIN — Medication 500000 UNIT(S): at 00:15

## 2023-08-13 RX ADMIN — Medication 81 MILLIGRAM(S): at 11:08

## 2023-08-13 RX ADMIN — FAMOTIDINE 20 MILLIGRAM(S): 10 INJECTION INTRAVENOUS at 11:08

## 2023-08-13 RX ADMIN — Medication 10 UNIT(S): at 09:26

## 2023-08-13 RX ADMIN — POLYETHYLENE GLYCOL 3350 17 GRAM(S): 17 POWDER, FOR SOLUTION ORAL at 18:02

## 2023-08-13 RX ADMIN — Medication 4: at 09:26

## 2023-08-13 RX ADMIN — ATORVASTATIN CALCIUM 40 MILLIGRAM(S): 80 TABLET, FILM COATED ORAL at 21:18

## 2023-08-13 RX ADMIN — Medication 5 MILLIGRAM(S): at 21:18

## 2023-08-13 RX ADMIN — Medication 100 MILLIGRAM(S): at 11:09

## 2023-08-13 RX ADMIN — Medication 2: at 13:16

## 2023-08-13 RX ADMIN — INSULIN GLARGINE 20 UNIT(S): 100 INJECTION, SOLUTION SUBCUTANEOUS at 22:48

## 2023-08-13 RX ADMIN — Medication 500000 UNIT(S): at 18:02

## 2023-08-13 RX ADMIN — POLYETHYLENE GLYCOL 3350 17 GRAM(S): 17 POWDER, FOR SOLUTION ORAL at 06:20

## 2023-08-13 RX ADMIN — Medication 12 UNIT(S): at 13:17

## 2023-08-13 RX ADMIN — Medication 1 TABLET(S): at 11:08

## 2023-08-13 RX ADMIN — Medication 500000 UNIT(S): at 06:17

## 2023-08-13 RX ADMIN — Medication 500000 UNIT(S): at 11:08

## 2023-08-13 NOTE — PROGRESS NOTE ADULT - TIME BILLING
Kidney Transplant recipient with functioning allograft  Elevated creatinine AMR DSA+ on Apheresis and IV Ig- 3 more sessions planned M/W/F  Creatinine trend noted, improved, now off dialysis  Comorbidities reviewed.  Patient seen, examined and reviewed available clinical and lab data including history,  progress notes and consult notes.  Reviewed immunosuppression and allograft function including urine out put, creatinine trend, urine studies and any allograft and bladder imaging.  Reviewed medication regimen for comorbidities  Suggestions:  Apheresis as scheduled  Steroid taper  Tacrolimus target trough level 8-10 ng/ml  Will follow  I was present during and reviewed clinical and lab data as well as assessment and plan as documented  . Please contact if any additional questions with any change in clinical condition or on availability of any additional information or reports.
Kidney Transplant recipient with functioning allograft  Elevated creatinine AMR DSA+ on Apheresiss and IV Ig  Creatinine trend noted  Comorbidities reviewed.  Patient seen, examined and reviewed available clinical and lab data including history,  progress notes and consult notes.  Reviewed immunosuppression and allograft function including urine out put, creatinine trend, urine studies and any allograft and bladder imaging.  Reviewed medication regimen for comorbidities  Suggestions:  Apheresis as scheduled  Tacrolimus target trough level 8-10 ng/ml  Will follow  I was present during and reviewed clinical and lab data as well as assessment and plan as documented  . Please contact if any additional questions with any change in clinical condition or on availability of any additional information or reports.
DGF, scheduled for biopsy  Hemodialysis as scheduled  Noted DSA, agree with plan for steroid pulse  I was present during and reviewed clinical and lab data as well as assessment and plan as documented by the housestaff as noted. Please contact if any additional questions with any change in clinical condition or on availability of any additional information or reports.
Kidney recipient DGF  AMR/DSA+ Steroid pulse, starting apheresis, IV Ig today  Suggestions  Tac level 8-10 ng/ml  Apheresis with IV Ig 200 mg/Kg post apheresis today, Saturday, Monday  Hold dialysis  Plan of care d/w patient and family member as well as apheresis team and transplant team  I was present during and reviewed clinical and lab data as well as assessment and plan as documented by the housestaff as noted. Please contact if any additional questions with any change in clinical condition or on availability of any additional information or reports.
Kidney recipient with DGF  DSA  On Steroid pulse   Scheduled for biopsy today of renal allohgraft  Will monitor for dialysis need. Hold off today  I was present during and reviewed clinical and lab data as well as assessment and plan as documented by the housestaff as noted. Please contact if any additional questions with any change in clinical condition or on availability of any additional information or reports.
DDRT DM, CAD  DGF  Non oliguric  S/p biopsy  DSA+  Steroid pulse  Plan  Monitor for allograft function recovery  Hold dialysis today  F/u biopsy  I was present during and reviewed clinical and lab data as well as assessment and plan as documented by the housestaff as noted. Please contact if any additional questions with any change in clinical condition or on availability of any additional information or reports.

## 2023-08-13 NOTE — PROGRESS NOTE ADULT - SUBJECTIVE AND OBJECTIVE BOX
--------------------------------------------------------------------------------  Chief Complaint: No new symptoms    24 hour events/subjective:  Reviewed      PAST HISTORY  --------------------------------------------------------------------------------  No significant changes to PMH, PSH, FHx, SHx, unless otherwise noted    ALLERGIES & MEDICATIONS  --------------------------------------------------------------------------------  Allergies    Contrast. (Unknown)    Intolerances      Standing Inpatient Medications  aspirin enteric coated 81 milliGRAM(s) Oral daily  atorvastatin 40 milliGRAM(s) Oral at bedtime  bisacodyl 5 milliGRAM(s) Oral at bedtime  ceFAZolin   IVPB 1000 milliGRAM(s) IV Intermittent every 8 hours  dextrose 5%. 1000 milliLiter(s) IV Continuous <Continuous>  dextrose 5%. 1000 milliLiter(s) IV Continuous <Continuous>  dextrose 50% Injectable 25 Gram(s) IV Push once  dextrose 50% Injectable 12.5 Gram(s) IV Push once  dextrose 50% Injectable 25 Gram(s) IV Push once  famotidine    Tablet 20 milliGRAM(s) Oral daily  furosemide    Tablet 80 milliGRAM(s) Oral daily  glucagon  Injectable 1 milliGRAM(s) IntraMuscular once  insulin glargine Injectable (LANTUS) 30 Unit(s) SubCutaneous at bedtime  insulin lispro (ADMELOG) corrective regimen sliding scale   SubCutaneous at bedtime  insulin lispro (ADMELOG) corrective regimen sliding scale   SubCutaneous three times a day before meals  insulin lispro Injectable (ADMELOG) 12 Unit(s) SubCutaneous three times a day before meals  mycophenolate mofetil 1000 milliGRAM(s) Oral two times a day  nystatin    Suspension 323131 Unit(s) Oral four times a day  polyethylene glycol 3350 17 Gram(s) Oral two times a day  senna 2 Tablet(s) Oral at bedtime  tacrolimus ER Tablet (ENVARSUS XR) 6 milliGRAM(s) Oral <User Schedule>  trimethoprim   80 mG/sulfamethoxazole 400 mG 1 Tablet(s) Oral daily  valGANciclovir 450 milliGRAM(s) Oral <User Schedule>    PRN Inpatient Medications  dextrose Oral Gel 15 Gram(s) Oral once PRN      REVIEW OF SYSTEMS  --------------------------------------------------------------------------------  Gen: No fevers/chills, weakness  Skin: No rashes  Head/Eyes/Ears/Mouth: No headache;No sore throat  Respiratory: No dyspnea, cough,   CV: No chest pain, PND, orthopnea  GI: No abdominal pain, diarrhea, constipation, nausea, vomiting  Transplant: No pain  : No   dysuria, hematuria, nocturia  MSK: No joint pain/swelling; no back pain; no edema  Neuro: No dizziness/lightheadedness, weakness, seizures, numbness, tingling  Psych: No significant nervousness, anxiety, stress, depression    All other systems were reviewed and are negative, except as noted.    VITALS/PHYSICAL EXAM  --------------------------------------------------------------------------------  T(C): 36.8 (08-13-23 @ 08:57), Max: 37.1 (08-12-23 @ 13:00)  HR: 90 (08-13-23 @ 08:57) (84 - 97)  BP: 169/73 (08-13-23 @ 08:57) (93/44 - 169/73)  RR: 18 (08-13-23 @ 08:57) (18 - 18)  SpO2: 99% (08-13-23 @ 08:57) (98% - 100%)           08-12-23 @ 07:01  -  08-13-23 @ 07:00  --------------------------------------------------------  IN: 420 mL / OUT: 1480 mL / NET: -1060 mL    08-13-23 @ 07:01  -  08-13-23 @ 11:37  --------------------------------------------------------  IN: 0 mL / OUT: 320 mL / NET: -320 mL      Physical Exam:  	Gen: NAD, well-appearing  	HEENT: No acute signs  	Pulm: CTA B/L  	CV: RRR, S1S2; no rub  	Back: No spinal or CVA tenderness; no sacral edema  	Abd: +BS, soft, nontender/nondistended                      Transplant: Healing incision  	: No suprapubic tenderness  	UE:  no asterixis  	LE: No acute signs  	Neuro: No focal deficits  	Psych: Normal affect and mood  	Skin: Warm, without rashes      LABS/STUDIES  --------------------------------------------------------------------------------              7.7    3.68  >-----------<  170      [08-13-23 @ 06:02]              23.7     138  |  102  |  51  ----------------------------<  262      [08-13-23 @ 06:00]  3.7   |  22  |  2.35        Ca     9.4     [08-13-23 @ 06:00]      iCa    1.28     [08-13 @ 06:00]      Mg     2.0     [08-13-23 @ 06:00]      Phos  2.0     [08-13-23 @ 06:00]      PT/INR: PT 10.6 , INR 1.01       [08-13-23 @ 06:02]  PTT: 25.2       [08-13-23 @ 06:02]      Creatinine Trend:  SCr 2.35 [08-13 @ 06:00]  SCr 2.54 [08-12 @ 06:55]  SCr 3.35 [08-11 @ 07:21]  SCr 3.72 [08-10 @ 07:38]  SCr 3.45 [08-09 @ 06:41]    Tacrolimus (), Serum: 8.6 ng/mL (08-13 @ 06:02)  Tacrolimus (), Serum: 4.7 ng/mL (08-12 @ 06:57)  Tacrolimus (), Serum: 6.2 ng/mL (08-11 @ 07:29)  Tacrolimus (), Serum: 7.2 ng/mL (08-10 @ 07:40)         Urinalysis - [08-13-23 @ 06:00]      Color  / Appearance  / SG  / pH       Gluc 262 / Ketone   / Bili  / Urobili        Blood  / Protein  / Leuk Est  / Nitrite       RBC  / WBC  / Hyaline  / Gran  / Sq Epi  / Non Sq Epi  / Bacteria         HBsAb 5.7      [07-15-23 @ 21:22]  HBsAg Nonreact      [07-15-23 @ 21:21]  HBcAb Nonreact      [07-15-23 @ 21:22]  HCV 0.16, Nonreact      [07-15-23 @ 21:22]  HIV Nonreact      [07-15-23 @ 21:21]

## 2023-08-13 NOTE — PROGRESS NOTE ADULT - SUBJECTIVE AND OBJECTIVE BOX
Patient is a 53y old  Male who presents with a chief complaint of UTI, fecal impaction (13 Aug 2023 11:07)      DATE OF SERVICE: 08-13-23 @ 11:14    SUBJECTIVE / OVERNIGHT EVENTS: overnight events noted    ROS:  Resp: No cough no sputum production  CVS: No chest pain no palpitations no orthopnea  GI: no N/V/D  : no dysuria, no hematuria  "I feel fine'         MEDICATIONS  (STANDING):  aspirin enteric coated 81 milliGRAM(s) Oral daily  atorvastatin 40 milliGRAM(s) Oral at bedtime  bisacodyl 5 milliGRAM(s) Oral at bedtime  ceFAZolin   IVPB 1000 milliGRAM(s) IV Intermittent every 8 hours  dextrose 5%. 1000 milliLiter(s) (50 mL/Hr) IV Continuous <Continuous>  dextrose 5%. 1000 milliLiter(s) (100 mL/Hr) IV Continuous <Continuous>  dextrose 50% Injectable 25 Gram(s) IV Push once  dextrose 50% Injectable 12.5 Gram(s) IV Push once  dextrose 50% Injectable 25 Gram(s) IV Push once  famotidine    Tablet 20 milliGRAM(s) Oral daily  furosemide    Tablet 80 milliGRAM(s) Oral daily  glucagon  Injectable 1 milliGRAM(s) IntraMuscular once  insulin glargine Injectable (LANTUS) 30 Unit(s) SubCutaneous at bedtime  insulin lispro (ADMELOG) corrective regimen sliding scale   SubCutaneous at bedtime  insulin lispro (ADMELOG) corrective regimen sliding scale   SubCutaneous three times a day before meals  insulin lispro Injectable (ADMELOG) 12 Unit(s) SubCutaneous three times a day before meals  mycophenolate mofetil 1000 milliGRAM(s) Oral two times a day  nystatin    Suspension 201584 Unit(s) Oral four times a day  polyethylene glycol 3350 17 Gram(s) Oral two times a day  senna 2 Tablet(s) Oral at bedtime  tacrolimus ER Tablet (ENVARSUS XR) 6 milliGRAM(s) Oral <User Schedule>  trimethoprim   80 mG/sulfamethoxazole 400 mG 1 Tablet(s) Oral daily  valGANciclovir 450 milliGRAM(s) Oral <User Schedule>    MEDICATIONS  (PRN):  dextrose Oral Gel 15 Gram(s) Oral once PRN Blood Glucose LESS THAN 70 milliGRAM(s)/deciliter        CAPILLARY BLOOD GLUCOSE      POCT Blood Glucose.: 230 mg/dL (13 Aug 2023 08:31)  POCT Blood Glucose.: 256 mg/dL (12 Aug 2023 21:29)  POCT Blood Glucose.: 168 mg/dL (12 Aug 2023 17:09)  POCT Blood Glucose.: 201 mg/dL (12 Aug 2023 12:58)    I&O's Summary    12 Aug 2023 07:01  -  13 Aug 2023 07:00  --------------------------------------------------------  IN: 420 mL / OUT: 1480 mL / NET: -1060 mL    13 Aug 2023 07:01  -  13 Aug 2023 11:14  --------------------------------------------------------  IN: 0 mL / OUT: 320 mL / NET: -320 mL        Vital Signs Last 24 Hrs  T(C): 36.8 (13 Aug 2023 08:57), Max: 37.1 (12 Aug 2023 13:00)  T(F): 98.3 (13 Aug 2023 08:57), Max: 98.7 (12 Aug 2023 13:00)  HR: 90 (13 Aug 2023 08:57) (84 - 97)  BP: 169/73 (13 Aug 2023 08:57) (93/44 - 169/73)  BP(mean): --  RR: 18 (13 Aug 2023 08:57) (18 - 18)  SpO2: 99% (13 Aug 2023 08:57) (98% - 100%)      PHYSICAL EXAM:  EYES: EOMI, PERRLA  CHEST/LUNG: clear   HEART: S1 S2; no murmurs   ABDOMEN: Soft, Nontender  EXTREMITIES:  no edema  NEUROLOGY: AO x 3 non-focal  SKIN: No rashes or lesions    LABS:                        7.7    3.68  )-----------( 170      ( 13 Aug 2023 06:02 )             23.7     08-13    138  |  102  |  51<H>  ----------------------------<  262<H>  3.7   |  22  |  2.35<H>    Ca    9.4      13 Aug 2023 06:00  Phos  2.0     08-13  Mg     2.0     08-13      PT/INR - ( 13 Aug 2023 06:02 )   PT: 10.6 sec;   INR: 1.01 ratio         PTT - ( 13 Aug 2023 06:02 )  PTT:25.2 sec      Urinalysis Basic - ( 13 Aug 2023 06:00 )    Color: x / Appearance: x / SG: x / pH: x  Gluc: 262 mg/dL / Ketone: x  / Bili: x / Urobili: x   Blood: x / Protein: x / Nitrite: x   Leuk Esterase: x / RBC: x / WBC x   Sq Epi: x / Non Sq Epi: x / Bacteria: x          All consultant(s) notes reviewed and care discussed with other providers        Contact Number, Dr Murphy [phone]

## 2023-08-13 NOTE — PROGRESS NOTE ADULT - SUBJECTIVE AND OBJECTIVE BOX
Transplant Surgery - Multidisciplinary Progress Note  --------------------------------------------------------------  DDRT 7/16/2023    Present:   Patient seen and examined with multidisciplinary Transplant team including  Surgeon: Dr. Vizcaino, SHERIE Brock,  Nephrologist: Dr. Hairston and unit RN during am rounds.  Disciplines not in attendance will be notified of the plan.     HPI: Tee Salvador is a 54 y/o M with past medical history significant for HTN, CHF (s/p AICD 2016--last interrogated 7/15/23), CAD (s/p CABG 2015), IDDM, PVD s/p stent x4 in RLE with R big toe/second toe amputation (2021) and ESRD on HD via LUE AVF for 6 years now s/p DDRT 7/16/2023 with Thymoglobulin induction. Post-transplant course complicated by severe constipation requiring disimpaction, DGF requiring hemodialysis and LUE swelling with concern for L subclavian thrombus, now s/p IR fistulogram 7/7/23 with balloon angioplasty of subclavian vein. He was discharged home on 7/27/23 and remained on HD MWF at home center [facility]. He presents from home via EMS to [facility] on 8/7/23 with constipation and continued DGF    Interval Events:  Afebrile, VSS on ABX  improving graft function daily  bx with concern for AMR, tolerating PLEX/IVIG/steroids  no complaints at this time    Immunosuppression:  Envarsus per level  MMF 1/1  pulse steroids with taper    Potential Discharge date: TBD    Education:  Medications    Plan of care:  See Below        MEDICATIONS  (STANDING):  aspirin enteric coated 81 milliGRAM(s) Oral daily  atorvastatin 40 milliGRAM(s) Oral at bedtime  bisacodyl 5 milliGRAM(s) Oral at bedtime  ceFAZolin   IVPB 1000 milliGRAM(s) IV Intermittent every 8 hours  dextrose 5%. 1000 milliLiter(s) (50 mL/Hr) IV Continuous <Continuous>  dextrose 5%. 1000 milliLiter(s) (100 mL/Hr) IV Continuous <Continuous>  dextrose 50% Injectable 12.5 Gram(s) IV Push once  dextrose 50% Injectable 25 Gram(s) IV Push once  dextrose 50% Injectable 25 Gram(s) IV Push once  famotidine    Tablet 20 milliGRAM(s) Oral daily  furosemide    Tablet 80 milliGRAM(s) Oral two times a day  glucagon  Injectable 1 milliGRAM(s) IntraMuscular once  insulin glargine Injectable (LANTUS) 30 Unit(s) SubCutaneous at bedtime  insulin lispro (ADMELOG) corrective regimen sliding scale   SubCutaneous three times a day before meals  insulin lispro (ADMELOG) corrective regimen sliding scale   SubCutaneous at bedtime  insulin lispro Injectable (ADMELOG) 12 Unit(s) SubCutaneous three times a day before meals  mycophenolate mofetil 1000 milliGRAM(s) Oral two times a day  nystatin    Suspension 492465 Unit(s) Oral four times a day  polyethylene glycol 3350 17 Gram(s) Oral two times a day  senna 2 Tablet(s) Oral at bedtime  tacrolimus ER Tablet (ENVARSUS XR) 6 milliGRAM(s) Oral <User Schedule>  trimethoprim   80 mG/sulfamethoxazole 400 mG 1 Tablet(s) Oral daily  valGANciclovir 450 milliGRAM(s) Oral <User Schedule>    MEDICATIONS  (PRN):  dextrose Oral Gel 15 Gram(s) Oral once PRN Blood Glucose LESS THAN 70 milliGRAM(s)/deciliter      PAST MEDICAL & SURGICAL HISTORY:  Diabetes mellitus  type 2      Foot ulcer due to secondary DM      Coronary artery disease      Acute on chronic systolic heart failure      H/O kidney transplant      Breast Reduction  at age 18      Toe amputation status, left      S/P CABG (coronary artery bypass graft)      AICD (automatic cardioverter/defibrillator) present          Vital Signs Last 24 Hrs  T(C): 36.8 (13 Aug 2023 08:57), Max: 37.1 (12 Aug 2023 13:00)  T(F): 98.3 (13 Aug 2023 08:57), Max: 98.7 (12 Aug 2023 13:00)  HR: 90 (13 Aug 2023 08:57) (84 - 97)  BP: 169/73 (13 Aug 2023 08:57) (93/44 - 169/73)  BP(mean): --  RR: 18 (13 Aug 2023 08:57) (18 - 18)  SpO2: 99% (13 Aug 2023 08:57) (98% - 100%)    Parameters below as of 13 Aug 2023 08:57  Patient On (Oxygen Delivery Method): room air        I&O's Summary    12 Aug 2023 07:01  -  13 Aug 2023 07:00  --------------------------------------------------------  IN: 420 mL / OUT: 1480 mL / NET: -1060 mL    13 Aug 2023 07:01  -  13 Aug 2023 11:10  --------------------------------------------------------  IN: 0 mL / OUT: 320 mL / NET: -320 mL                              7.7    3.68  )-----------( 170      ( 13 Aug 2023 06:02 )             23.7     08-13    138  |  102  |  51<H>  ----------------------------<  262<H>  3.7   |  22  |  2.35<H>    Ca    9.4      13 Aug 2023 06:00  Phos  2.0     08-13  Mg     2.0     08-13      Tacrolimus (), Serum: 8.6 ng/mL (08-13 @ 06:02)        Culture - Blood (collected 08-08-23 @ 07:49)  Source: .Blood Blood-Peripheral  Final Report (08-13-23 @ 11:00):    No growth at 5 days    Culture - Blood (collected 08-08-23 @ 07:49)  Source: .Blood Blood-Peripheral  Final Report (08-13-23 @ 11:00):    No growth at 5 days    Culture - Urine (collected 08-07-23 @ 05:21)  Source: Clean Catch Clean Catch (Midstream)  Final Report (08-10-23 @ 12:05):    50,000 - 99,000 CFU/mL Klebsiella pneumoniae  Organism: Klebsiella pneumoniae (08-10-23 @ 12:05)  Organism: Klebsiella pneumoniae (08-10-23 @ 12:05)                    MEDICATIONS  (STANDING):  aspirin enteric coated 81 milliGRAM(s) Oral daily  atorvastatin 40 milliGRAM(s) Oral at bedtime  bisacodyl 5 milliGRAM(s) Oral at bedtime  ceFAZolin   IVPB 1000 milliGRAM(s) IV Intermittent every 8 hours  dextrose 5%. 1000 milliLiter(s) (50 mL/Hr) IV Continuous <Continuous>  dextrose 5%. 1000 milliLiter(s) (100 mL/Hr) IV Continuous <Continuous>  dextrose 50% Injectable 25 Gram(s) IV Push once  dextrose 50% Injectable 12.5 Gram(s) IV Push once  dextrose 50% Injectable 25 Gram(s) IV Push once  famotidine    Tablet 20 milliGRAM(s) Oral daily  furosemide    Tablet 80 milliGRAM(s) Oral two times a day  glucagon  Injectable 1 milliGRAM(s) IntraMuscular once  insulin glargine Injectable (LANTUS) 27 Unit(s) SubCutaneous at bedtime  insulin lispro (ADMELOG) corrective regimen sliding scale   SubCutaneous three times a day before meals  insulin lispro (ADMELOG) corrective regimen sliding scale   SubCutaneous at bedtime  insulin lispro Injectable (ADMELOG) 10 Unit(s) SubCutaneous three times a day before meals  mycophenolate mofetil 1000 milliGRAM(s) Oral two times a day  nystatin    Suspension 089382 Unit(s) Oral four times a day  polyethylene glycol 3350 17 Gram(s) Oral two times a day  senna 2 Tablet(s) Oral at bedtime  sevelamer carbonate 800 milliGRAM(s) Oral three times a day with meals  tacrolimus ER Tablet (ENVARSUS XR) 4 milliGRAM(s) Oral <User Schedule>  trimethoprim   80 mG/sulfamethoxazole 400 mG 1 Tablet(s) Oral daily  valGANciclovir 450 milliGRAM(s) Oral <User Schedule>    MEDICATIONS  (PRN):  dextrose Oral Gel 15 Gram(s) Oral once PRN Blood Glucose LESS THAN 70 milliGRAM(s)/deciliter      PAST MEDICAL & SURGICAL HISTORY:  Diabetes mellitus  type 2      Foot ulcer due to secondary DM      Coronary artery disease      Acute on chronic systolic heart failure      H/O kidney transplant      Breast Reduction  at age 18      Toe amputation status, left      S/P CABG (coronary artery bypass graft)      AICD (automatic cardioverter/defibrillator) present          Vital Signs Last 24 Hrs  T(C): 36.8 (12 Aug 2023 05:00), Max: 37.1 (11 Aug 2023 22:05)  T(F): 98.3 (12 Aug 2023 05:00), Max: 98.8 (11 Aug 2023 23:50)  HR: 90 (12 Aug 2023 05:00) (71 - 94)  BP: 125/84 (12 Aug 2023 05:00) (97/47 - 132/60)  BP(mean): --  RR: 18 (12 Aug 2023 05:00) (18 - 19)  SpO2: 98% (12 Aug 2023 05:00) (95% - 100%)    Parameters below as of 12 Aug 2023 05:00  Patient On (Oxygen Delivery Method): room air        I&O's Summary    11 Aug 2023 07:01  -  12 Aug 2023 07:00  --------------------------------------------------------  IN: 740 mL / OUT: 1570 mL / NET: -830 mL                              7.5    3.45  )-----------( 164      ( 12 Aug 2023 06:57 )             23.6     08-12    139  |  101  |  58<H>  ----------------------------<  206<H>  3.8   |  24  |  2.54<H>    Ca    9.6      12 Aug 2023 06:55  Phos  2.6     08-12  Mg     1.7     08-12      Tacrolimus (), Serum: 6.2 ng/mL (08-11 @ 07:29)        Culture - Blood (collected 08-08-23 @ 07:49)  Source: .Blood Blood-Peripheral  Preliminary Report (08-11-23 @ 11:01):    No growth at 72 Hours    Culture - Blood (collected 08-08-23 @ 07:49)  Source: .Blood Blood-Peripheral  Preliminary Report (08-11-23 @ 11:01):    No growth at 72 Hours    Culture - Urine (collected 08-07-23 @ 05:21)  Source: Clean Catch Clean Catch (Midstream)  Final Report (08-10-23 @ 12:05):    50,000 - 99,000 CFU/mL Klebsiella pneumoniae  Organism: Klebsiella pneumoniae (08-10-23 @ 12:05)  Organism: Klebsiella pneumoniae (08-10-23 @ 12:05)                  Review of systems  Gen: No weight changes, fatigue, fevers/chills, weakness  Skin: No rashes  Head/Eyes/Ears/Mouth: No headache; Normal hearing; Normal vision w/o blurriness; No sinus pain/discomfort, sore throat  Respiratory: No dyspnea, cough, wheezing, hemoptysis  CV: No chest pain, PND, orthopnea  GI: no abdominal pain, denies diarrhea, constipation, nausea, vomiting, melena, hematochezia  : No increased frequency, dysuria, hematuria, nocturia  MSK: No joint pain/swelling; no back pain; no edema  Neuro: No dizziness/lightheadedness, weakness, seizures, numbness, tingling  Heme: No easy bruising or bleeding  Endo: No heat/cold intolerance  Psych: No significant nervousness, anxiety, stress, depression  All other systems were reviewed and are negative, except as noted.      PHYSICAL EXAM:  Constitutional: Well developed / well nourished  Eyes: Anicteric, PERRLA  ENMT: nc/at  Neck: supple  Respiratory: CTA B/L  Cardiovascular: RRR  Gastrointestinal: Soft, ND/NT.  well healed incisional scar. LIANA serous  Genitourinary: Voiding spontaneously  Extremities: SCD's in place and working bilaterally  Vascular: Palpable dp pulses bilaterally  Neurological: A&O x3  Skin: no rashes, ulcerations or lesions;  Musculoskeletal: Moving all extremities  Psychiatric: Responsive

## 2023-08-13 NOTE — PROGRESS NOTE ADULT - SUBJECTIVE AND OBJECTIVE BOX
[facility] [place] [facility]   MD DANIEL WALDEN MD RUORU WONG, PA    TEL:  FROM 9 AM to 5 PM ---OFFICE: [phone]    FROM 5 PM - 9 AM PLEASE CALL ANSWERING SERVICE: [phone]    RENAL FOLLOW UP NOTE--Date of Service 08-13-23 @ 08:08  --------------------------------------------------------------------------------  HPI:      Pt seen and examined at bedside.   Aramisies SOB, chest pain     PAST HISTORY  --------------------------------------------------------------------------------  No significant changes to PMH, PSH, FHx, SHx, unless otherwise noted    ALLERGIES & MEDICATIONS  --------------------------------------------------------------------------------  Allergies    Contrast. (Unknown)    Intolerances      Standing Inpatient Medications  aspirin enteric coated 81 milliGRAM(s) Oral daily  atorvastatin 40 milliGRAM(s) Oral at bedtime  bisacodyl 5 milliGRAM(s) Oral at bedtime  ceFAZolin   IVPB 1000 milliGRAM(s) IV Intermittent every 8 hours  dextrose 5%. 1000 milliLiter(s) IV Continuous <Continuous>  dextrose 5%. 1000 milliLiter(s) IV Continuous <Continuous>  dextrose 50% Injectable 12.5 Gram(s) IV Push once  dextrose 50% Injectable 25 Gram(s) IV Push once  dextrose 50% Injectable 25 Gram(s) IV Push once  famotidine    Tablet 20 milliGRAM(s) Oral daily  furosemide    Tablet 80 milliGRAM(s) Oral two times a day  glucagon  Injectable 1 milliGRAM(s) IntraMuscular once  insulin glargine Injectable (LANTUS) 27 Unit(s) SubCutaneous at bedtime  insulin lispro (ADMELOG) corrective regimen sliding scale   SubCutaneous three times a day before meals  insulin lispro (ADMELOG) corrective regimen sliding scale   SubCutaneous at bedtime  insulin lispro Injectable (ADMELOG) 10 Unit(s) SubCutaneous three times a day before meals  mycophenolate mofetil 1000 milliGRAM(s) Oral two times a day  nystatin    Suspension 989324 Unit(s) Oral four times a day  polyethylene glycol 3350 17 Gram(s) Oral two times a day  senna 2 Tablet(s) Oral at bedtime  sevelamer carbonate 800 milliGRAM(s) Oral three times a day with meals  tacrolimus ER Tablet (ENVARSUS XR) 6 milliGRAM(s) Oral <User Schedule>  trimethoprim   80 mG/sulfamethoxazole 400 mG 1 Tablet(s) Oral daily  valGANciclovir 450 milliGRAM(s) Oral <User Schedule>    PRN Inpatient Medications  dextrose Oral Gel 15 Gram(s) Oral once PRN      REVIEW OF SYSTEMS  --------------------------------------------------------------------------------  General: no fever  CVS: no chest pain  MSK: + edema     VITALS/PHYSICAL EXAM  --------------------------------------------------------------------------------  T(C): 37 (08-13-23 @ 06:31), Max: 37.1 (08-12-23 @ 13:00)  HR: 84 (08-13-23 @ 06:31) (84 - 97)  BP: 124/61 (08-13-23 @ 06:31) (93/44 - 151/68)  RR: 18 (08-13-23 @ 06:31) (18 - 18)  SpO2: 98% (08-13-23 @ 06:31) (98% - 100%)  Wt(kg): --    Weight (kg): 91.7 (08-11-23 @ 09:00)      08-12-23 @ 07:01  -  08-13-23 @ 07:00  --------------------------------------------------------  IN: 420 mL / OUT: 1480 mL / NET: -1060 mL      Physical Exam:  	Gen: NAD  	HEENT: MMM  	Pulm: CTA B/L  	CV: S1S2  	Abd: Soft, +BS  	Ext: + LE edema B/L                      Neuro: Awake   	Skin: Warm and Dry   	Vascular access: avf          MAKAYLA granda  LABS/STUDIES  --------------------------------------------------------------------------------              7.7    3.68  >-----------<  170      [08-13-23 @ 06:02]              23.7     138  |  102  |  51  ----------------------------<  262      [08-13-23 @ 06:00]  3.7   |  22  |  2.35        Ca     9.4     [08-13-23 @ 06:00]      iCa    1.28     [08-13 @ 06:00]      Mg     2.0     [08-13-23 @ 06:00]      Phos  2.0     [08-13-23 @ 06:00]      PT/INR: PT 10.6 , INR 1.01       [08-13-23 @ 06:02]  PTT: 25.2       [08-13-23 @ 06:02]      Creatinine Trend:  SCr 2.35 [08-13 @ 06:00]  SCr 2.54 [08-12 @ 06:55]  SCr 3.35 [08-11 @ 07:21]  SCr 3.72 [08-10 @ 07:38]  SCr 3.45 [08-09 @ 06:41]    Urinalysis - [08-13-23 @ 06:00]      Color  / Appearance  / SG  / pH       Gluc 262 / Ketone   / Bili  / Urobili        Blood  / Protein  / Leuk Est  / Nitrite       RBC  / WBC  / Hyaline  / Gran  / Sq Epi  / Non Sq Epi  / Bacteria         HBsAb 5.7      [07-15-23 @ 21:22]  HBsAg Nonreact      [07-15-23 @ 21:21]  HBcAb Nonreact      [07-15-23 @ 21:22]  HCV 0.16, Nonreact      [07-15-23 @ 21:22]  HIV Nonreact      [07-15-23 @ 21:21]

## 2023-08-13 NOTE — PROGRESS NOTE ADULT - ASSESSMENT
54 y/o M with past medical history significant for HTN, CHF (s/p AICD 2016), CAD (s/p CABG 2015), IDDM, PVD s/p stent x4 in RLE with R big toe/second toe amputation (2021) and ESRD on HD via LUE AVF for 6 years now s/p DDRT 7/16/2023 with Thymoglobulin induction.   Post-transplant course complicated by severe constipation requiring disimpaction, DGF requiring hemodialysis and LUE swelling with concern for L subclavian thrombus, now s/p IR fistulogram 7/7/23 with balloon angioplasty of subclavian vein.   discharged home on 7/27/23 and remained on HD MWF   He presents from home via EMS to [facility] on 8/7/23 with abdominal discomfort and no bowel movement for 3 days.     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

## 2023-08-13 NOTE — PROGRESS NOTE ADULT - ASSESSMENT
54 yo M with hx of DM2 CAD, CHF, s/p CABG 2015, AICD (2016), on hemodialysis for approximately 6 years via L AVF (nephrologist Dr. Bharath Romo), He has hx of PVD, is s/p 4 stents in R leg (mid and distal right superficial femoral artery, right popliteal x2 on 1/14/22).; is sp RT big toe and second toe amputation 2021, on asa and plavix for severe PVD (per pt). Now here for possible DDRT. Pt reports does not make urine only few drops occasionally. Pt denies N/V/D, fevers/chills, CP, SOB. Pt reports last ate at 4pm today and had HD yesterday 7/14/23.  Nephrology consulted for ESRD s/p DDRT.      s/p DDRT @ [facility] 7/16/2023   Initially received Simulect induction -- Changed to Thymoglobulin given delayed graft function.  Pt discharged on previous admission on HD.   Post transport course significant for DGF  Via L AVF --> s/p Left Radiocephalic fistulogram and subclavian vein angioplasty with Vascular 7/24/2023  Consent obtained from patient and placed in chart.  s/p CT A& P No contrast 8/7/23 --> partially imaged L pleural effusion, unchanged. Partially  imaged LLL opacity again seen.  small amount of dependent air within urinary bladder, small renal cysts, left transplant kidney with mil hydro. Stent extends from kidney to bladder.   s/p Doppler Renal US Transplant kidney 8/7/23 --> patent renal vasculature. Elevated renal artery peak systolic velocities. Concern for possible renal artery stenosis at anastomosis. Mild elevation  of intrarenal resistive indices , grossly unchanged from prior exam.   Continue Immunosuppression per transplant : Envarsus 4 mg qd, Cellcept 1 gm BID, and prednisone 5 mg qd.   Tacro trough is 7.2 8/10  s/p Transplant renal biopsy 8/09 -- concern for AMR. per transplant ,  initiated on  PLEX/IVIG (3 sessions for now, while in house).  Monitor labs and urine output. Avoid nephrotoxins  HD per transplant, last HD 8/7    Dose medications as per eGFR.  Renal Diet   Monitor     Anemia  Mgmt per Transplant - planning for LUX with HD  Iron panel pending    Maintain Hgb > 8  Monitor     HTN:   BP optimal     Monitor     Proteinuria / Hematuria   likely in setting of + LE, bacteria  Urine cx + Klebsiella   Mgmt per transplant

## 2023-08-13 NOTE — PROGRESS NOTE ADULT - ASSESSMENT
Tee Salvador is a 54 y/o M with past medical history significant for HTN, CHF (s/p AICD 2016--last interrogated 7/15/23), CAD (s/p CABG 2015), IDDM, PVD s/p stent x4 in RLE with R big toe/second toe amputation (2021) and ESRD on HD via LUE AVF for 6 years now s/p DDRT 7/16/2023 with Thymoglobulin induction. Post-transplant course complicated by severe constipation requiring disimpaction, DGF requiring hemodialysis and LUE swelling with concern for L subclavian thrombus, now s/p IR fistulogram 7/7/23 with balloon angioplasty of subclavian vein. He was discharged home on 7/27/23 and remained on HD MWF at home center [facility]. He presents from home via EMS to [facility] on 8/7/23 with constipation and continued DGF    DGF s/p DDRT  -S/P IR biopsy on 8/10 with concern for AMR. will continue PLEX/IVIG (plan for 5 sessions total)  -DSA+ : will continue pulse dose steroids with taper (500 on 8/8, 250 on 8/9, 125 on 8/10, 60 on 8/12).  Pred PO taper to start today down to 20QD  -graft function improving  -last HD on 8/7, decrease to Lasix 80QD  -UTI: Klebsiella, CTX-->Ancef (total of 7D per ID)  -BCx negative  -LIANA to continue  -bowel regimen  -diet as tolerated  -SCDs  -last epogen 8/11, stable h/h. resume ASA    Immunosuppression  -Envarsus per level  -MMF 1/1  -steroids as above  -PPx regimen: Valcyte/Bactrim/Nystatin/PPI with renal dosing    DM  -adjust while on steroids     Tee Salvador is a 54 y/o M with past medical history significant for HTN, CHF (s/p AICD 2016--last interrogated 7/15/23), CAD (s/p CABG 2015), IDDM, PVD s/p stent x4 in RLE with R big toe/second toe amputation (2021) and ESRD on HD via LUE AVF for 6 years now s/p DDRT 7/16/2023 with Thymoglobulin induction. Post-transplant course complicated by severe constipation requiring disimpaction, DGF requiring hemodialysis and LUE swelling with concern for L subclavian thrombus, now s/p IR fistulogram 7/7/23 with balloon angioplasty of subclavian vein. He was discharged home on 7/27/23 and remained on HD MWF at home center [facility]. He presents from home via EMS to [facility] on 8/7/23 with constipation and continued DGF    DGF s/p DDRT  -S/P IR biopsy on 8/10 with concern for AMR. will continue PLEX/IVIG (plan for 5 sessions total, next on 8/14)  -DSA+ : will continue pulse dose steroids with taper (500 on 8/8, 250 on 8/9, 125 on 8/10, 60 on 8/12).  Pred PO taper to start today down to 20QD  -graft function improving  -last HD on 8/7, decrease to Lasix 80QD  -UTI: Klebsiella, CTX-->Ancef (total of 7D per ID)  -will plan to remove ureteral stent on 8/15 in OR with Dr. Vizcaino  -BCx negative  -LIANA to continue  -bowel regimen  -diet as tolerated  -SCDs  -last epogen 8/11, stable h/h. continue ASA    Immunosuppression  -Envarsus per level  -MMF 1/1  -steroids as above  -PPx regimen: Valcyte/Bactrim/Nystatin/PPI with renal dosing    DM  -adjust while on steroids

## 2023-08-14 LAB
ANION GAP SERPL CALC-SCNC: 11 MMOL/L — SIGNIFICANT CHANGE UP (ref 5–17)
APTT BLD: 25.5 SEC — SIGNIFICANT CHANGE UP (ref 24.5–35.6)
BASOPHILS # BLD AUTO: 0.01 K/UL — SIGNIFICANT CHANGE UP (ref 0–0.2)
BASOPHILS NFR BLD AUTO: 0.2 % — SIGNIFICANT CHANGE UP (ref 0–2)
BLD GP AB SCN SERPL QL: NEGATIVE — SIGNIFICANT CHANGE UP
BUN SERPL-MCNC: 46 MG/DL — HIGH (ref 7–23)
CA-I BLD-SCNC: 1.28 MMOL/L — SIGNIFICANT CHANGE UP (ref 1.15–1.33)
CALCIUM SERPL-MCNC: 9.4 MG/DL — SIGNIFICANT CHANGE UP (ref 8.4–10.5)
CHLORIDE SERPL-SCNC: 101 MMOL/L — SIGNIFICANT CHANGE UP (ref 96–108)
CO2 SERPL-SCNC: 25 MMOL/L — SIGNIFICANT CHANGE UP (ref 22–31)
CREAT FLD-MCNC: 2.17 MG/DL — SIGNIFICANT CHANGE UP
CREAT SERPL-MCNC: 2.3 MG/DL — HIGH (ref 0.5–1.3)
EGFR: 33 ML/MIN/1.73M2 — LOW
EOSINOPHIL # BLD AUTO: 0 K/UL — SIGNIFICANT CHANGE UP (ref 0–0.5)
EOSINOPHIL NFR BLD AUTO: 0 % — SIGNIFICANT CHANGE UP (ref 0–6)
FIBRINOGEN PPP-MCNC: 223 MG/DL — SIGNIFICANT CHANGE UP (ref 200–445)
GLUCOSE BLDC GLUCOMTR-MCNC: 186 MG/DL — HIGH (ref 70–99)
GLUCOSE BLDC GLUCOMTR-MCNC: 193 MG/DL — HIGH (ref 70–99)
GLUCOSE BLDC GLUCOMTR-MCNC: 219 MG/DL — HIGH (ref 70–99)
GLUCOSE BLDC GLUCOMTR-MCNC: 232 MG/DL — HIGH (ref 70–99)
GLUCOSE SERPL-MCNC: 166 MG/DL — HIGH (ref 70–99)
HCT VFR BLD CALC: 25.6 % — LOW (ref 39–50)
HGB BLD-MCNC: 8.1 G/DL — LOW (ref 13–17)
IMM GRANULOCYTES NFR BLD AUTO: 4.1 % — HIGH (ref 0–0.9)
INR BLD: 0.95 RATIO — SIGNIFICANT CHANGE UP (ref 0.85–1.18)
LYMPHOCYTES # BLD AUTO: 0.13 K/UL — LOW (ref 1–3.3)
LYMPHOCYTES # BLD AUTO: 2.9 % — LOW (ref 13–44)
MAGNESIUM SERPL-MCNC: 1.8 MG/DL — SIGNIFICANT CHANGE UP (ref 1.6–2.6)
MCHC RBC-ENTMCNC: 30.9 PG — SIGNIFICANT CHANGE UP (ref 27–34)
MCHC RBC-ENTMCNC: 31.6 GM/DL — LOW (ref 32–36)
MCV RBC AUTO: 97.7 FL — SIGNIFICANT CHANGE UP (ref 80–100)
MONOCYTES # BLD AUTO: 0.41 K/UL — SIGNIFICANT CHANGE UP (ref 0–0.9)
MONOCYTES NFR BLD AUTO: 9.2 % — SIGNIFICANT CHANGE UP (ref 2–14)
NEUTROPHILS # BLD AUTO: 3.71 K/UL — SIGNIFICANT CHANGE UP (ref 1.8–7.4)
NEUTROPHILS NFR BLD AUTO: 83.6 % — HIGH (ref 43–77)
NRBC # BLD: 0 /100 WBCS — SIGNIFICANT CHANGE UP (ref 0–0)
PHOSPHATE SERPL-MCNC: 1.9 MG/DL — LOW (ref 2.5–4.5)
PLATELET # BLD AUTO: 181 K/UL — SIGNIFICANT CHANGE UP (ref 150–400)
POTASSIUM SERPL-MCNC: 3.8 MMOL/L — SIGNIFICANT CHANGE UP (ref 3.5–5.3)
POTASSIUM SERPL-SCNC: 3.8 MMOL/L — SIGNIFICANT CHANGE UP (ref 3.5–5.3)
PROTHROM AB SERPL-ACNC: 10.5 SEC — SIGNIFICANT CHANGE UP (ref 9.5–13)
RBC # BLD: 2.62 M/UL — LOW (ref 4.2–5.8)
RBC # FLD: 17.9 % — HIGH (ref 10.3–14.5)
RH IG SCN BLD-IMP: POSITIVE — SIGNIFICANT CHANGE UP
SODIUM SERPL-SCNC: 137 MMOL/L — SIGNIFICANT CHANGE UP (ref 135–145)
SURGICAL PATHOLOGY STUDY: SIGNIFICANT CHANGE UP
TACROLIMUS SERPL-MCNC: 8.3 NG/ML — SIGNIFICANT CHANGE UP
WBC # BLD: 4.44 K/UL — SIGNIFICANT CHANGE UP (ref 3.8–10.5)
WBC # FLD AUTO: 4.44 K/UL — SIGNIFICANT CHANGE UP (ref 3.8–10.5)

## 2023-08-14 PROCEDURE — 36514 APHERESIS PLASMA: CPT

## 2023-08-14 PROCEDURE — 99232 SBSQ HOSP IP/OBS MODERATE 35: CPT

## 2023-08-14 RX ORDER — CARVEDILOL PHOSPHATE 80 MG/1
3.12 CAPSULE, EXTENDED RELEASE ORAL EVERY 12 HOURS
Refills: 0 | Status: DISCONTINUED | OUTPATIENT
Start: 2023-08-14 | End: 2023-08-20

## 2023-08-14 RX ORDER — ACETAMINOPHEN 500 MG
650 TABLET ORAL ONCE
Refills: 0 | Status: COMPLETED | OUTPATIENT
Start: 2023-08-14 | End: 2023-08-14

## 2023-08-14 RX ORDER — SODIUM CHLORIDE 9 MG/ML
500 INJECTION INTRAMUSCULAR; INTRAVENOUS; SUBCUTANEOUS ONCE
Refills: 0 | Status: COMPLETED | OUTPATIENT
Start: 2023-08-14 | End: 2023-08-14

## 2023-08-14 RX ORDER — DIPHENHYDRAMINE HCL 50 MG
25 CAPSULE ORAL ONCE
Refills: 0 | Status: COMPLETED | OUTPATIENT
Start: 2023-08-14 | End: 2023-08-14

## 2023-08-14 RX ORDER — CEFAZOLIN SODIUM 1 G
1000 VIAL (EA) INJECTION EVERY 8 HOURS
Refills: 0 | Status: COMPLETED | OUTPATIENT
Start: 2023-08-14 | End: 2023-08-17

## 2023-08-14 RX ORDER — IMMUNE GLOBULIN (HUMAN) 10 G/100ML
9 INJECTION INTRAVENOUS; SUBCUTANEOUS ONCE
Refills: 0 | Status: COMPLETED | OUTPATIENT
Start: 2023-08-14 | End: 2023-08-14

## 2023-08-14 RX ORDER — MAGNESIUM SULFATE 500 MG/ML
2 VIAL (ML) INJECTION ONCE
Refills: 0 | Status: COMPLETED | OUTPATIENT
Start: 2023-08-14 | End: 2023-08-14

## 2023-08-14 RX ADMIN — Medication 25 MILLIGRAM(S): at 15:35

## 2023-08-14 RX ADMIN — Medication 650 MILLIGRAM(S): at 16:00

## 2023-08-14 RX ADMIN — Medication 25 GRAM(S): at 08:00

## 2023-08-14 RX ADMIN — Medication 100 MILLIGRAM(S): at 05:20

## 2023-08-14 RX ADMIN — ATORVASTATIN CALCIUM 40 MILLIGRAM(S): 80 TABLET, FILM COATED ORAL at 21:08

## 2023-08-14 RX ADMIN — Medication 4: at 17:18

## 2023-08-14 RX ADMIN — Medication 40 MILLIGRAM(S): at 09:32

## 2023-08-14 RX ADMIN — Medication 500000 UNIT(S): at 05:19

## 2023-08-14 RX ADMIN — TACROLIMUS 6 MILLIGRAM(S): 5 CAPSULE ORAL at 07:58

## 2023-08-14 RX ADMIN — Medication 5 MILLIGRAM(S): at 21:08

## 2023-08-14 RX ADMIN — Medication 100 MILLIGRAM(S): at 21:07

## 2023-08-14 RX ADMIN — MYCOPHENOLATE MOFETIL 1000 MILLIGRAM(S): 250 CAPSULE ORAL at 19:35

## 2023-08-14 RX ADMIN — Medication 12 UNIT(S): at 12:33

## 2023-08-14 RX ADMIN — FAMOTIDINE 20 MILLIGRAM(S): 10 INJECTION INTRAVENOUS at 12:34

## 2023-08-14 RX ADMIN — Medication 500000 UNIT(S): at 17:19

## 2023-08-14 RX ADMIN — CARVEDILOL PHOSPHATE 3.12 MILLIGRAM(S): 80 CAPSULE, EXTENDED RELEASE ORAL at 09:32

## 2023-08-14 RX ADMIN — Medication 500000 UNIT(S): at 22:34

## 2023-08-14 RX ADMIN — SODIUM CHLORIDE 1000 MILLILITER(S): 9 INJECTION INTRAMUSCULAR; INTRAVENOUS; SUBCUTANEOUS at 18:44

## 2023-08-14 RX ADMIN — Medication 650 MILLIGRAM(S): at 15:35

## 2023-08-14 RX ADMIN — MYCOPHENOLATE MOFETIL 1000 MILLIGRAM(S): 250 CAPSULE ORAL at 07:57

## 2023-08-14 RX ADMIN — INSULIN GLARGINE 20 UNIT(S): 100 INJECTION, SOLUTION SUBCUTANEOUS at 21:17

## 2023-08-14 RX ADMIN — SENNA PLUS 2 TABLET(S): 8.6 TABLET ORAL at 21:08

## 2023-08-14 RX ADMIN — Medication 12 UNIT(S): at 09:31

## 2023-08-14 RX ADMIN — Medication 12 UNIT(S): at 17:18

## 2023-08-14 RX ADMIN — Medication 81 MILLIGRAM(S): at 12:34

## 2023-08-14 RX ADMIN — Medication 80 MILLIGRAM(S): at 07:57

## 2023-08-14 RX ADMIN — Medication 2: at 12:33

## 2023-08-14 RX ADMIN — VALGANCICLOVIR 450 MILLIGRAM(S): 450 TABLET, FILM COATED ORAL at 07:57

## 2023-08-14 RX ADMIN — Medication 500000 UNIT(S): at 12:34

## 2023-08-14 RX ADMIN — IMMUNE GLOBULIN (HUMAN) 15 GRAM(S): 10 INJECTION INTRAVENOUS; SUBCUTANEOUS at 16:44

## 2023-08-14 RX ADMIN — Medication 1 TABLET(S): at 12:34

## 2023-08-14 RX ADMIN — POLYETHYLENE GLYCOL 3350 17 GRAM(S): 17 POWDER, FOR SOLUTION ORAL at 05:26

## 2023-08-14 RX ADMIN — Medication 85 MILLIMOLE(S): at 10:45

## 2023-08-14 RX ADMIN — Medication 100 MILLIGRAM(S): at 09:32

## 2023-08-14 RX ADMIN — CARVEDILOL PHOSPHATE 3.12 MILLIGRAM(S): 80 CAPSULE, EXTENDED RELEASE ORAL at 17:27

## 2023-08-14 RX ADMIN — Medication 4: at 09:31

## 2023-08-14 NOTE — PROGRESS NOTE ADULT - ASSESSMENT
52 y/o M with past medical history significant for HTN, CHF (s/p AICD 2016), CAD (s/p CABG 2015), IDDM, PVD s/p stent x4 in RLE with R big toe/second toe amputation (2021) and ESRD on HD via LUE AVF for 6 years now s/p DDRT 7/16/2023 with Thymoglobulin induction.   Post-transplant course complicated by severe constipation requiring disimpaction, DGF requiring hemodialysis and LUE swelling with concern for L subclavian thrombus, now s/p IR fistulogram 7/7/23 with balloon angioplasty of subclavian vein.   discharged home on 7/27/23 and remained on HD MWF   He presents from home via EMS to [facility] on 8/7/23 with abdominal discomfort and no bowel movement for 3 days.     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

## 2023-08-14 NOTE — CHART NOTE - NSCHARTNOTEFT_GEN_A_CORE
52 y/o M with past medical history significant for HTN, CHF (s/p AICD 2016--last interrogated 7/15/23), CAD (s/p CABG 2015), IDDM, PVD s/p stent x4 in RLE with R big toe/second toe amputation (2021) and ESRD on HD via LUE AVF for 6 years now s/p DDRT 7/16/2023. Kidney biopsy on 8/10 concerned for AMR and diabetic nephrosclerosis. BB is contacted to initiate PLEX. A course of 3 PLEX planned for now (8/11,8/12,8/14).    8/11/23 PLEX #1, 1 plasma volume with half donor plasma and half 5% albumin as replacement fluid. PLEX#1 completed with no issues.  8/12/23 PLEX #2, 1 plasma volume with half donor plasma and half 5% albumin as replacement fluid. PLEX#2 completed with no issues.  PLEX #3 today. Labs reviewed. 1 plasma volume with half donor plasma and half 5% albumin as replacement fluid. Pt BP dropped to 87s during the procedure but pt asymptomatic. 100ml saline bolus given. BP went up to 110s. Procedure resumed and completed with no other issues.    Please contact BB if additional PLEX required.

## 2023-08-14 NOTE — PROGRESS NOTE ADULT - ASSESSMENT
54 yo M with h/o ESRD on HD x6 years via LUE AVF (outpatient nephrologist: Dr. Shaka Sanchez), underwent DDRT on 7/16/23, DM2 CAD, CHF, underwent CABG 2015, AICD (2016), and severe PVD (underwent R big toe and second toe amputation in 2021) here for abdominal pain and constipation also with DGF requiring iHD, renal biopsy done 8/9/23.     1. s/p DDRT on 7/16/23- Allograft with GIN due to AMR. Cr is improving   Renal biopsy  showing AMR and diabetic nephrosclerosis (g1, ptc0-1 c4d 0). Plan for total 5 sessions of PLEX  s/p PLEX on  8/11, 8/12. session #3 today- 8/14  s/p pulse dose steroids, now on taper- prednisone 40 mg po daily today   2. IS meds- Tac for goal 1 gm po bid , MMF 1gm po bid and steroid taper   3. Klebsiella UTI- on cefazolin

## 2023-08-14 NOTE — PROGRESS NOTE ADULT - SUBJECTIVE AND OBJECTIVE BOX
Patient is a 53y old  Male who presents with a chief complaint of UTI, fecal impaction (14 Aug 2023 10:20)      DATE OF SERVICE: 08-14-23 @ 13:47    SUBJECTIVE / OVERNIGHT EVENTS: overnight events noted    ROS:  Resp: No cough no sputum production  CVS: No chest pain no palpitations no orthopnea  GI: no N/V/D        MEDICATIONS  (STANDING):  acetaminophen     Tablet .. 650 milliGRAM(s) Oral once  aspirin enteric coated 81 milliGRAM(s) Oral daily  atorvastatin 40 milliGRAM(s) Oral at bedtime  bisacodyl 5 milliGRAM(s) Oral at bedtime  carvedilol 3.125 milliGRAM(s) Oral every 12 hours  ceFAZolin   IVPB 1000 milliGRAM(s) IV Intermittent every 8 hours  dextrose 5%. 1000 milliLiter(s) (50 mL/Hr) IV Continuous <Continuous>  dextrose 5%. 1000 milliLiter(s) (100 mL/Hr) IV Continuous <Continuous>  dextrose 50% Injectable 25 Gram(s) IV Push once  dextrose 50% Injectable 12.5 Gram(s) IV Push once  dextrose 50% Injectable 25 Gram(s) IV Push once  diphenhydrAMINE 25 milliGRAM(s) Oral once  famotidine    Tablet 20 milliGRAM(s) Oral daily  glucagon  Injectable 1 milliGRAM(s) IntraMuscular once  immune   globulin 10% (GAMMAGARD) IVPB 9 Gram(s) IV Intermittent once  insulin glargine Injectable (LANTUS) 20 Unit(s) SubCutaneous at bedtime  insulin lispro (ADMELOG) corrective regimen sliding scale   SubCutaneous at bedtime  insulin lispro (ADMELOG) corrective regimen sliding scale   SubCutaneous three times a day before meals  insulin lispro Injectable (ADMELOG) 12 Unit(s) SubCutaneous three times a day before meals  mycophenolate mofetil 1000 milliGRAM(s) Oral two times a day  nystatin    Suspension 429243 Unit(s) Oral four times a day  polyethylene glycol 3350 17 Gram(s) Oral two times a day  senna 2 Tablet(s) Oral at bedtime  tacrolimus ER Tablet (ENVARSUS XR) 6 milliGRAM(s) Oral <User Schedule>  trimethoprim   80 mG/sulfamethoxazole 400 mG 1 Tablet(s) Oral daily  valGANciclovir 450 milliGRAM(s) Oral <User Schedule>    MEDICATIONS  (PRN):  dextrose Oral Gel 15 Gram(s) Oral once PRN Blood Glucose LESS THAN 70 milliGRAM(s)/deciliter        CAPILLARY BLOOD GLUCOSE      POCT Blood Glucose.: 186 mg/dL (14 Aug 2023 12:16)  POCT Blood Glucose.: 219 mg/dL (14 Aug 2023 08:54)  POCT Blood Glucose.: 180 mg/dL (13 Aug 2023 21:53)  POCT Blood Glucose.: 105 mg/dL (13 Aug 2023 17:07)    I&O's Summary    13 Aug 2023 07:01  -  14 Aug 2023 07:00  --------------------------------------------------------  IN: 420 mL / OUT: 1590 mL / NET: -1170 mL        Vital Signs Last 24 Hrs  T(C): 36.5 (14 Aug 2023 13:14), Max: 37.1 (13 Aug 2023 17:13)  T(F): 97.7 (14 Aug 2023 13:14), Max: 98.7 (13 Aug 2023 17:13)  HR: 87 (14 Aug 2023 13:14) (87 - 94)  BP: 102/57 (14 Aug 2023 13:14) (102/57 - 150/78)  BP(mean): --  RR: 20 (14 Aug 2023 13:14) (18 - 20)  SpO2: 96% (14 Aug 2023 13:14) (96% - 100%)    PHYSICAL EXAM:  EYES: EOMI, PERRLA  CHEST/LUNG: clear   HEART: S1 S2; no murmurs   ABDOMEN: Soft, Nontender  EXTREMITIES:  no edema  NEUROLOGY: AO x 3 non-focal  SKIN: No rashes or lesions    LABS:                        8.1    4.44  )-----------( 181      ( 14 Aug 2023 06:17 )             25.6     08-14    137  |  101  |  46<H>  ----------------------------<  166<H>  3.8   |  25  |  2.30<H>    Ca    9.4      14 Aug 2023 06:15  Phos  1.9     08-14  Mg     1.8     08-14      PT/INR - ( 14 Aug 2023 06:17 )   PT: 10.5 sec;   INR: 0.95 ratio         PTT - ( 14 Aug 2023 06:17 )  PTT:25.5 sec      Urinalysis Basic - ( 14 Aug 2023 06:15 )    Color: x / Appearance: x / SG: x / pH: x  Gluc: 166 mg/dL / Ketone: x  / Bili: x / Urobili: x   Blood: x / Protein: x / Nitrite: x   Leuk Esterase: x / RBC: x / WBC x   Sq Epi: x / Non Sq Epi: x / Bacteria: x          All consultant(s) notes reviewed and care discussed with other providers        Contact Number, Dr Murphy [phone]

## 2023-08-14 NOTE — PROGRESS NOTE ADULT - ASSESSMENT
CAD s/p cabg  stable   asa, statin    chronic systolic chf  stable  improving LV function   on coreg   off ace/arb/arni for now   s/p ICD    ESRD  s/p transplant   fu with transplant team   plan for plasmapheresis

## 2023-08-14 NOTE — PROGRESS NOTE ADULT - ASSESSMENT
54 yo M with hx of DM2 CAD, CHF, s/p CABG 2015, AICD (2016), on hemodialysis for approximately 6 years via L AVF (nephrologist Dr. Bharath Romo), He has hx of PVD, is s/p 4 stents in R leg (mid and distal right superficial femoral artery, right popliteal x2 on 1/14/22).; is sp RT big toe and second toe amputation 2021, on asa and plavix for severe PVD (per pt). Now here for possible DDRT. Pt reports does not make urine only few drops occasionally. Pt denies N/V/D, fevers/chills, CP, SOB. Pt reports last ate at 4pm today and had HD yesterday 7/14/23.  Nephrology consulted for ESRD s/p DDRT.      s/p DDRT @ [facility] 7/16/2023   Initially received Simulect induction -- Changed to Thymoglobulin given delayed graft function.  Pt discharged on previous admission on HD.   Post transport course significant for DGF  Via L AVF --> s/p Left Radiocephalic fistulogram and subclavian vein angioplasty with Vascular 7/24/2023  Consent obtained from patient and placed in chart.  s/p CT A& P No contrast 8/7/23 --> partially imaged L pleural effusion, unchanged. Partially  imaged LLL opacity again seen.  small amount of dependent air within urinary bladder, small renal cysts, left transplant kidney with mil hydro. Stent extends from kidney to bladder.   s/p Doppler Renal US Transplant kidney 8/7/23 --> patent renal vasculature. Elevated renal artery peak systolic velocities. Concern for possible renal artery stenosis at anastomosis. Mild elevation  of intrarenal resistive indices , grossly unchanged from prior exam.   Continue Immunosuppression per transplant : Tacrolimus 1 gm po bid , Cellcept 1 gm bid, and steroid taper.   Tacro trough is 8.3 on 8/14/23   s/p Transplant renal biopsy 8/09 --Renal biopsy  showing AMR and diabetic nephrosclerosis (g1, ptc0-1 c4d 0). Plan for total 5 sessions of PLEX, s/p PLEX on  8/11, 8/12. session #3 today- 8/14  Monitor labs and urine output. Avoid nephrotoxins  HD per transplant, last HD 8/7    Dose medications as per eGFR.  Renal Diet   Monitor       Anemia  Mgmt per Transplant - planning for LUX with HD  Maintain Hgb > 8  Monitor     HTN:   BP fluctuates   Monitor     Proteinuria / Hematuria   likely in setting of + LE, bacteria  Urine cx + Klebsiella   On Cefazolin  Mgmt per Transplant ID

## 2023-08-14 NOTE — PROGRESS NOTE ADULT - SUBJECTIVE AND OBJECTIVE BOX
[facility] DIVISION OF KIDNEY DISEASES AND HYPERTENSION -- FOLLOW UP NOTE  --------------------------------------------------------------------------------  Chief Complaint:    24 hour events/subjective:  Patient was seen and examined at bedside  no overnight events       PAST HISTORY  --------------------------------------------------------------------------------  No significant changes to PMH, PSH, FHx, SHx, unless otherwise noted    ALLERGIES & MEDICATIONS  --------------------------------------------------------------------------------  Allergies    Contrast. (Unknown)    Intolerances      Standing Inpatient Medications  aspirin enteric coated 81 milliGRAM(s) Oral daily  atorvastatin 40 milliGRAM(s) Oral at bedtime  bisacodyl 5 milliGRAM(s) Oral at bedtime  carvedilol 3.125 milliGRAM(s) Oral every 12 hours  ceFAZolin   IVPB 1000 milliGRAM(s) IV Intermittent every 8 hours  dextrose 5%. 1000 milliLiter(s) IV Continuous <Continuous>  dextrose 5%. 1000 milliLiter(s) IV Continuous <Continuous>  dextrose 50% Injectable 12.5 Gram(s) IV Push once  dextrose 50% Injectable 25 Gram(s) IV Push once  dextrose 50% Injectable 25 Gram(s) IV Push once  famotidine    Tablet 20 milliGRAM(s) Oral daily  glucagon  Injectable 1 milliGRAM(s) IntraMuscular once  insulin glargine Injectable (LANTUS) 20 Unit(s) SubCutaneous at bedtime  insulin lispro (ADMELOG) corrective regimen sliding scale   SubCutaneous at bedtime  insulin lispro (ADMELOG) corrective regimen sliding scale   SubCutaneous three times a day before meals  insulin lispro Injectable (ADMELOG) 12 Unit(s) SubCutaneous three times a day before meals  mycophenolate mofetil 1000 milliGRAM(s) Oral two times a day  nystatin    Suspension 802084 Unit(s) Oral four times a day  polyethylene glycol 3350 17 Gram(s) Oral two times a day  senna 2 Tablet(s) Oral at bedtime  sodium phosphate 30 milliMole(s)/500 mL IVPB 30 milliMole(s) IV Intermittent once  tacrolimus ER Tablet (ENVARSUS XR) 6 milliGRAM(s) Oral <User Schedule>  trimethoprim   80 mG/sulfamethoxazole 400 mG 1 Tablet(s) Oral daily  valGANciclovir 450 milliGRAM(s) Oral <User Schedule>    PRN Inpatient Medications  dextrose Oral Gel 15 Gram(s) Oral once PRN      REVIEW OF SYSTEMS  --------------------------------------------------------------------------------  Gen: No fatigue, fevers/chills, weakness  Skin: No rashes  Head/Eyes/Ears/Mouth: No headache;No sore throat  Respiratory: No dyspnea, cough,   CV: No chest pain, PND, orthopnea  GI: No abdominal pain, diarrhea, constipation, nausea, vomiting  Transplant: No pain  : No increased frequency, dysuria, hematuria, nocturia  MSK: No joint pain/swelling; no back pain; no edema  Neuro: No dizziness/lightheadedness, weakness, seizures, numbness, tingling  Psych: No significant nervousness, anxiety, stress, depression    All other systems were reviewed and are negative, except as noted.    VITALS/PHYSICAL EXAM  --------------------------------------------------------------------------------  T(C): 36.4 (08-14-23 @ 09:16), Max: 37.1 (08-13-23 @ 17:13)  HR: 94 (08-14-23 @ 09:16) (88 - 94)  BP: 136/72 (08-14-23 @ 09:16) (115/62 - 150/78)  RR: 20 (08-14-23 @ 09:16) (18 - 20)  SpO2: 98% (08-14-23 @ 09:16) (98% - 100%)  Wt(kg): --        08-13-23 @ 07:01  -  08-14-23 @ 07:00  --------------------------------------------------------  IN: 420 mL / OUT: 1590 mL / NET: -1170 mL      Physical Exam:  	Gen: NAD  	HEENT: PERRL, supple neck, clear oropharynx  	Pulm: CTA B/L  	CV: RRR, S1S2; no rub  	Back: No spinal or CVA tenderness; no sacral edema  	Abd: +BS, soft, nontender/nondistended                      Transplant: No tenderness, swelling  	: No suprapubic tenderness  	UE: Warm, FROM; no edema; no asterixis  	LE: Warm, FROM; no edema  	Neuro: No focal deficits  	Psych: Normal affect and mood  	Skin: Warm, without rashes      LABS/STUDIES  --------------------------------------------------------------------------------              8.1    4.44  >-----------<  181      [08-14-23 @ 06:17]              25.6     137  |  101  |  46  ----------------------------<  166      [08-14-23 @ 06:15]  3.8   |  25  |  2.30        Ca     9.4     [08-14-23 @ 06:15]      iCa    1.28     [08-14 @ 06:15]      Mg     1.8     [08-14-23 @ 06:15]      Phos  1.9     [08-14-23 @ 06:15]      PT/INR: PT 10.5 , INR 0.95       [08-14-23 @ 06:17]  PTT: 25.5       [08-14-23 @ 06:17]      Creatinine Trend:  SCr 2.30 [08-14 @ 06:15]  SCr 2.35 [08-13 @ 06:00]  SCr 2.54 [08-12 @ 06:55]  SCr 3.35 [08-11 @ 07:21]  SCr 3.72 [08-10 @ 07:38]    Tacrolimus (), Serum: 8.3 ng/mL (08-14 @ 06:17)  Tacrolimus (), Serum: 8.6 ng/mL (08-13 @ 06:02)  Tacrolimus (), Serum: 4.7 ng/mL (08-12 @ 06:57)  Tacrolimus (), Serum: 6.2 ng/mL (08-11 @ 07:29)            Urinalysis - [08-14-23 @ 06:15]      Color  / Appearance  / SG  / pH       Gluc 166 / Ketone   / Bili  / Urobili        Blood  / Protein  / Leuk Est  / Nitrite       RBC  / WBC  / Hyaline  / Gran  / Sq Epi  / Non Sq Epi  / Bacteria         HBsAb 5.7      [07-15-23 @ 21:22]  HBsAg Nonreact      [07-15-23 @ 21:21]  HBcAb Nonreact      [07-15-23 @ 21:22]  HCV 0.16, Nonreact      [07-15-23 @ 21:22]  HIV Nonreact      [07-15-23 @ 21:21]

## 2023-08-14 NOTE — PROGRESS NOTE ADULT - SUBJECTIVE AND OBJECTIVE BOX
[facility]   MD DANIEL WALDEN MD BRIANA PETITO, NP    TEL:  FROM 9 AM to 5 PM ---OFFICE: [phone]  FROM 5 PM - 9 AM PLEASE CALL ANSWERING SERVICE: [phone]     RENAL PROGRESS NOTE: DATE OF SERVICE 08-14-23 @ 14:41  Patient is a 53y old  Male who presents with a chief complaint of UTI, fecal impaction   Patient seen and examined at bedside. No chest pain/sob    VITALS:  T(F): 97.7 (08-14-23 @ 13:14), Max: 98.7 (08-13-23 @ 17:13)  HR: 87 (08-14-23 @ 13:14)  BP: 102/57 (08-14-23 @ 13:14)  RR: 20 (08-14-23 @ 13:14)  SpO2: 96% (08-14-23 @ 13:14)  Wt(kg): --    08-13 @ 07:01  -  08-14 @ 07:00  --------------------------------------------------------  IN: 420 mL / OUT: 1590 mL / NET: -1170 mL    08-14 @ 07:01  -  08-14 @ 14:41  --------------------------------------------------------  IN: 0 mL / OUT: 150 mL / NET: -150 mL      PHYSICAL EXAM:  Constitutional: NAD  Neck: No JVD  Respiratory: CTAB, no wheezes, rales or rhonchi  Cardiovascular: S1, S2, RRR  Gastrointestinal: BS+, soft, NT/ND  Extremities: No peripheral edema  Access: L AVF -- palpable thrill, bruit auscultated      Hospital Medications:   MEDICATIONS  (STANDING):  acetaminophen     Tablet .. 650 milliGRAM(s) Oral once  aspirin enteric coated 81 milliGRAM(s) Oral daily  atorvastatin 40 milliGRAM(s) Oral at bedtime  bisacodyl 5 milliGRAM(s) Oral at bedtime  carvedilol 3.125 milliGRAM(s) Oral every 12 hours  ceFAZolin   IVPB 1000 milliGRAM(s) IV Intermittent every 8 hours  dextrose 5%. 1000 milliLiter(s) (50 mL/Hr) IV Continuous <Continuous>  dextrose 5%. 1000 milliLiter(s) (100 mL/Hr) IV Continuous <Continuous>  dextrose 50% Injectable 25 Gram(s) IV Push once  dextrose 50% Injectable 12.5 Gram(s) IV Push once  dextrose 50% Injectable 25 Gram(s) IV Push once  diphenhydrAMINE 25 milliGRAM(s) Oral once  famotidine    Tablet 20 milliGRAM(s) Oral daily  glucagon  Injectable 1 milliGRAM(s) IntraMuscular once  immune   globulin 10% (GAMMAGARD) IVPB 9 Gram(s) IV Intermittent once  insulin glargine Injectable (LANTUS) 20 Unit(s) SubCutaneous at bedtime  insulin lispro (ADMELOG) corrective regimen sliding scale   SubCutaneous three times a day before meals  insulin lispro (ADMELOG) corrective regimen sliding scale   SubCutaneous at bedtime  insulin lispro Injectable (ADMELOG) 12 Unit(s) SubCutaneous three times a day before meals  mycophenolate mofetil 1000 milliGRAM(s) Oral two times a day  nystatin    Suspension 241647 Unit(s) Oral four times a day  polyethylene glycol 3350 17 Gram(s) Oral two times a day  senna 2 Tablet(s) Oral at bedtime  tacrolimus ER Tablet (ENVARSUS XR) 6 milliGRAM(s) Oral <User Schedule>  trimethoprim   80 mG/sulfamethoxazole 400 mG 1 Tablet(s) Oral daily  valGANciclovir 450 milliGRAM(s) Oral <User Schedule>    Tacrolimus (), Serum: 8.3 ng/mL (08-14 @ 06:17)    LABS:  08-14    137  |  101  |  46<H>  ----------------------------<  166<H>  3.8   |  25  |  2.30<H>    Ca    9.4      14 Aug 2023 06:15  Phos  1.9     08-14  Mg     1.8     08-14      Creatinine Trend: 2.30 <--, 2.35 <--, 2.54 <--, 3.35 <--, 3.72 <--, 3.45 <--, 3.43 <--    Phosphorus: 1.9 mg/dL (08-14 @ 06:15)                              8.1    4.44  )-----------( 181      ( 14 Aug 2023 06:17 )             25.6     Urine Studies:  Urinalysis - [08-14-23 @ 06:15]      Color  / Appearance  / SG  / pH       Gluc 166 / Ketone   / Bili  / Urobili        Blood  / Protein  / Leuk Est  / Nitrite       RBC  / WBC  / Hyaline  / Gran  / Sq Epi  / Non Sq Epi  / Bacteria         HBsAb 5.7      [07-15-23 @ 21:22]  HBsAg Nonreact      [07-15-23 @ 21:21]  HBcAb Nonreact      [07-15-23 @ 21:22]  HCV 0.16, Nonreact      [07-15-23 @ 21:22]  HIV Nonreact      [07-15-23 @ 21:21]      RADIOLOGY & ADDITIONAL STUDIES:

## 2023-08-14 NOTE — PROGRESS NOTE ADULT - SUBJECTIVE AND OBJECTIVE BOX
Transplant Surgery - Multidisciplinary Progress Note  --------------------------------------------------------------  DDRT 7/16/2023    Present:   Patient seen and examined with multidisciplinary Transplant team including  Surgeon: Dr. Delacruz, SHERIE Hughes,  Nephrologist: Dr. BHMUIKA Corado and unit RN during am rounds.  Disciplines not in attendance will be notified of the plan.     HPI: Tee Salvador is a 54 y/o M with past medical history significant for HTN, CHF (s/p AICD 2016--last interrogated 7/15/23), CAD (s/p CABG 2015), IDDM, PVD s/p stent x4 in RLE with R big toe/second toe amputation (2021) and ESRD on HD via LUE AVF for 6 years now s/p DDRT 7/16/2023 with Thymoglobulin induction. Post-transplant course complicated by severe constipation requiring disimpaction, DGF requiring hemodialysis and LUE swelling with concern for L subclavian thrombus, now s/p IR fistulogram 7/7/23 with balloon angioplasty of subclavian vein. He was discharged home on 7/27/23 and remained on HD MWF at home center [facility]. He presents from home via EMS to [facility] on 8/7/23 with constipation and continued DGF    Interval Events:  Afebrile, VSS on ABX  improving graft function daily  bx with concern for AMR, tolerating PLEX/IVIG/steroids  no complaints at this time  LIANA x1 still in place from OR    Immunosuppression:  Envarsus per level  MMF 1/1  pulse steroids with taper    Potential Discharge date: TBD    Education:  Medications    Plan of care:  See Below        MEDICATIONS  (STANDING):  aspirin enteric coated 81 milliGRAM(s) Oral daily  atorvastatin 40 milliGRAM(s) Oral at bedtime  bisacodyl 5 milliGRAM(s) Oral at bedtime  carvedilol 3.125 milliGRAM(s) Oral every 12 hours  ceFAZolin   IVPB 1000 milliGRAM(s) IV Intermittent every 8 hours  dextrose 5%. 1000 milliLiter(s) (50 mL/Hr) IV Continuous <Continuous>  dextrose 5%. 1000 milliLiter(s) (100 mL/Hr) IV Continuous <Continuous>  dextrose 50% Injectable 12.5 Gram(s) IV Push once  dextrose 50% Injectable 25 Gram(s) IV Push once  dextrose 50% Injectable 25 Gram(s) IV Push once  famotidine    Tablet 20 milliGRAM(s) Oral daily  glucagon  Injectable 1 milliGRAM(s) IntraMuscular once  insulin glargine Injectable (LANTUS) 20 Unit(s) SubCutaneous at bedtime  insulin lispro (ADMELOG) corrective regimen sliding scale   SubCutaneous at bedtime  insulin lispro (ADMELOG) corrective regimen sliding scale   SubCutaneous three times a day before meals  insulin lispro Injectable (ADMELOG) 12 Unit(s) SubCutaneous three times a day before meals  mycophenolate mofetil 1000 milliGRAM(s) Oral two times a day  nystatin    Suspension 250664 Unit(s) Oral four times a day  polyethylene glycol 3350 17 Gram(s) Oral two times a day  senna 2 Tablet(s) Oral at bedtime  sodium phosphate 30 milliMole(s)/500 mL IVPB 30 milliMole(s) IV Intermittent once  tacrolimus ER Tablet (ENVARSUS XR) 6 milliGRAM(s) Oral <User Schedule>  trimethoprim   80 mG/sulfamethoxazole 400 mG 1 Tablet(s) Oral daily  valGANciclovir 450 milliGRAM(s) Oral <User Schedule>    MEDICATIONS  (PRN):  dextrose Oral Gel 15 Gram(s) Oral once PRN Blood Glucose LESS THAN 70 milliGRAM(s)/deciliter      PAST MEDICAL & SURGICAL HISTORY:  Diabetes mellitus  type 2      Foot ulcer due to secondary DM      Coronary artery disease      Acute on chronic systolic heart failure      H/O kidney transplant      Breast Reduction  at age 18      Toe amputation status, left      S/P CABG (coronary artery bypass graft)      AICD (automatic cardioverter/defibrillator) present          Vital Signs Last 24 Hrs  T(C): 36.4 (14 Aug 2023 09:16), Max: 37.1 (13 Aug 2023 17:13)  T(F): 97.6 (14 Aug 2023 09:16), Max: 98.7 (13 Aug 2023 17:13)  HR: 94 (14 Aug 2023 09:16) (88 - 94)  BP: 136/72 (14 Aug 2023 09:16) (115/62 - 150/78)  BP(mean): --  RR: 20 (14 Aug 2023 09:16) (18 - 20)  SpO2: 98% (14 Aug 2023 09:16) (98% - 100%)    Parameters below as of 14 Aug 2023 09:16  Patient On (Oxygen Delivery Method): room air        I&O's Summary    13 Aug 2023 07:01  -  14 Aug 2023 07:00  --------------------------------------------------------  IN: 420 mL / OUT: 1590 mL / NET: -1170 mL                              8.1    4.44  )-----------( 181      ( 14 Aug 2023 06:17 )             25.6     08-14    137  |  101  |  46<H>  ----------------------------<  166<H>  3.8   |  25  |  2.30<H>    Ca    9.4      14 Aug 2023 06:15  Phos  1.9     08-14  Mg     1.8     08-14      Tacrolimus (), Serum: 8.3 ng/mL (08-14 @ 06:17)        Culture - Blood (collected 08-08-23 @ 07:49)  Source: .Blood Blood-Peripheral  Final Report (08-13-23 @ 11:00):    No growth at 5 days    Culture - Blood (collected 08-08-23 @ 07:49)  Source: .Blood Blood-Peripheral  Final Report (08-13-23 @ 11:00):    No growth at 5 days      Review of systems  Gen: No weight changes, fatigue, fevers/chills, weakness  Skin: No rashes  Head/Eyes/Ears/Mouth: No headache; Normal hearing; Normal vision w/o blurriness; No sinus pain/discomfort, sore throat  Respiratory: No dyspnea, cough, wheezing, hemoptysis  CV: No chest pain, PND, orthopnea  GI: no abdominal pain, denies diarrhea, constipation, nausea, vomiting, melena, hematochezia  : No increased frequency, dysuria, hematuria, nocturia  MSK: No joint pain/swelling; no back pain; no edema  Neuro: No dizziness/lightheadedness, weakness, seizures, numbness, tingling  Heme: No easy bruising or bleeding  Endo: No heat/cold intolerance  Psych: No significant nervousness, anxiety, stress, depression  All other systems were reviewed and are negative, except as noted.      PHYSICAL EXAM:  Constitutional: Well developed / well nourished  Eyes: Anicteric, PERRLA  ENMT: nc/at  Neck: supple  Respiratory: CTA B/L  Cardiovascular: RRR  Gastrointestinal: Soft, ND/NT. RLQ incision with some staples still in place . LIANA serous  Genitourinary: Voiding spontaneously  Extremities: SCD's in place and working bilaterally  Vascular: Palpable dp pulses bilaterally  Neurological: A&O x3  Skin: no rashes, ulcerations or lesions;  Musculoskeletal: Moving all extremities  Psychiatric: Responsive

## 2023-08-14 NOTE — PROGRESS NOTE ADULT - ASSESSMENT
Tee Salvador is a 52 y/o M with past medical history significant for HTN, CHF (s/p AICD 2016--last interrogated 7/15/23), CAD (s/p CABG 2015), IDDM, PVD s/p stent x4 in RLE with R big toe/second toe amputation (2021) and ESRD on HD via LUE AVF for 6 years now s/p DDRT 7/16/2023 with Thymoglobulin induction. Post-transplant course complicated by severe constipation requiring disimpaction, DGF requiring hemodialysis and LUE swelling with concern for L subclavian thrombus, now s/p IR fistulogram 7/7/23 with balloon angioplasty of subclavian vein. He was discharged home on 7/27/23 and remained on HD MWF at home center [facility]. He presents from home via EMS to [facility] on 8/7/23 with constipation and continued DGF    DGF s/p DDRT 7/16/23  -S/P IR biopsy on 8/10 with concern for AMR. will continue PLEX/IVIG (plan for 5 sessions total, PLEX/IVIG#3 today 8/14)  -DSA+ : will continue pulse dose steroids with taper (500 on 8/8, 250 on 8/9, 125 on 8/10, 60 on 8/12).  Prednisone 40 mg PO x1 today, start Prednisone 20 mg daily tomorrow  -graft function improving  -last HD on 8/7, d/c furosemide  -UTI: Klebsiella, CTX-->Ancef (total of 7D per ID), last day on 8/16, send repeat Ucx today  -will plan to remove ureteral stent on 8/15 in OR with Dr. Vizcaino, NPO at midnight  -BCx negative  -LIANA to continue, send for Fluid Creatinine  -bowel regimen  -diet as tolerated  -SCDs  -last epogen 8/11, stable h/h. continue ASA    Immunosuppression  -Envarsus per level  -MMF 1/1  -steroids as above  -PPx regimen: Valcyte/Bactrim/Nystatin/PPI with renal dosing    DM  -Lantus/Lispro adjust while on steroids

## 2023-08-14 NOTE — PROGRESS NOTE ADULT - SUBJECTIVE AND OBJECTIVE BOX
Subjective: Patient seen and examined. No new events except as noted.     SUBJECTIVE/ROS:  No chest pain, dyspnea, palpitation, or dizziness.       MEDICATIONS:  MEDICATIONS  (STANDING):  aspirin enteric coated 81 milliGRAM(s) Oral daily  atorvastatin 40 milliGRAM(s) Oral at bedtime  bisacodyl 5 milliGRAM(s) Oral at bedtime  carvedilol 3.125 milliGRAM(s) Oral every 12 hours  dextrose 5%. 1000 milliLiter(s) (50 mL/Hr) IV Continuous <Continuous>  dextrose 5%. 1000 milliLiter(s) (100 mL/Hr) IV Continuous <Continuous>  dextrose 50% Injectable 25 Gram(s) IV Push once  dextrose 50% Injectable 12.5 Gram(s) IV Push once  dextrose 50% Injectable 25 Gram(s) IV Push once  famotidine    Tablet 20 milliGRAM(s) Oral daily  glucagon  Injectable 1 milliGRAM(s) IntraMuscular once  insulin glargine Injectable (LANTUS) 20 Unit(s) SubCutaneous at bedtime  insulin lispro (ADMELOG) corrective regimen sliding scale   SubCutaneous at bedtime  insulin lispro (ADMELOG) corrective regimen sliding scale   SubCutaneous three times a day before meals  insulin lispro Injectable (ADMELOG) 12 Unit(s) SubCutaneous three times a day before meals  mycophenolate mofetil 1000 milliGRAM(s) Oral two times a day  nystatin    Suspension 634096 Unit(s) Oral four times a day  polyethylene glycol 3350 17 Gram(s) Oral two times a day  predniSONE   Tablet 40 milliGRAM(s) Oral once  senna 2 Tablet(s) Oral at bedtime  sodium phosphate 30 milliMole(s)/500 mL IVPB 30 milliMole(s) IV Intermittent once  tacrolimus ER Tablet (ENVARSUS XR) 6 milliGRAM(s) Oral <User Schedule>  trimethoprim   80 mG/sulfamethoxazole 400 mG 1 Tablet(s) Oral daily  valGANciclovir 450 milliGRAM(s) Oral <User Schedule>      PHYSICAL EXAM:  T(C): 36.9 (08-14-23 @ 05:24), Max: 37.1 (08-13-23 @ 17:13)  HR: 94 (08-14-23 @ 05:24) (88 - 94)  BP: 131/76 (08-14-23 @ 05:24) (115/62 - 150/78)  RR: 18 (08-14-23 @ 05:24) (18 - 18)  SpO2: 99% (08-14-23 @ 05:24) (99% - 100%)  Wt(kg): --  I&O's Summary    13 Aug 2023 07:01  -  14 Aug 2023 07:00  --------------------------------------------------------  IN: 420 mL / OUT: 1590 mL / NET: -1170 mL            JVP: Normal  Neck: supple  Lung: clear   CV: S1 S2 , Murmur:  Abd: soft  Ext: No edema  neuro: Awake / alert  Psych: flat affect  Skin: normal``    LABS/DATA:    CARDIAC MARKERS:                                8.1    4.44  )-----------( 181      ( 14 Aug 2023 06:17 )             25.6     08-14    137  |  101  |  46<H>  ----------------------------<  166<H>  3.8   |  25  |  2.30<H>    Ca    9.4      14 Aug 2023 06:15  Phos  1.9     08-14  Mg     1.8     08-14      proBNP:   Lipid Profile:   HgA1c:   TSH:     TELE:  EKG:

## 2023-08-15 LAB
ANION GAP SERPL CALC-SCNC: 14 MMOL/L — SIGNIFICANT CHANGE UP (ref 5–17)
APTT BLD: 25.5 SEC — SIGNIFICANT CHANGE UP (ref 24.5–35.6)
BASOPHILS # BLD AUTO: 0 K/UL — SIGNIFICANT CHANGE UP (ref 0–0.2)
BASOPHILS NFR BLD AUTO: 0 % — SIGNIFICANT CHANGE UP (ref 0–2)
BUN SERPL-MCNC: 42 MG/DL — HIGH (ref 7–23)
CA-I BLD-SCNC: 1.28 MMOL/L — SIGNIFICANT CHANGE UP (ref 1.15–1.33)
CALCIUM SERPL-MCNC: 9 MG/DL — SIGNIFICANT CHANGE UP (ref 8.4–10.5)
CHLORIDE SERPL-SCNC: 101 MMOL/L — SIGNIFICANT CHANGE UP (ref 96–108)
CO2 SERPL-SCNC: 23 MMOL/L — SIGNIFICANT CHANGE UP (ref 22–31)
CREAT SERPL-MCNC: 2.09 MG/DL — HIGH (ref 0.5–1.3)
CULTURE RESULTS: SIGNIFICANT CHANGE UP
EGFR: 37 ML/MIN/1.73M2 — LOW
EOSINOPHIL # BLD AUTO: 0 K/UL — SIGNIFICANT CHANGE UP (ref 0–0.5)
EOSINOPHIL NFR BLD AUTO: 0 % — SIGNIFICANT CHANGE UP (ref 0–6)
FIBRINOGEN AG PPP IA-MCNC: 434 MG/DL — SIGNIFICANT CHANGE UP (ref 233–496)
FIBRINOGEN PPP-MCNC: 184 MG/DL — LOW (ref 200–445)
GLUCOSE BLDC GLUCOMTR-MCNC: 128 MG/DL — HIGH (ref 70–99)
GLUCOSE BLDC GLUCOMTR-MCNC: 131 MG/DL — HIGH (ref 70–99)
GLUCOSE BLDC GLUCOMTR-MCNC: 150 MG/DL — HIGH (ref 70–99)
GLUCOSE BLDC GLUCOMTR-MCNC: 172 MG/DL — HIGH (ref 70–99)
GLUCOSE BLDC GLUCOMTR-MCNC: 205 MG/DL — HIGH (ref 70–99)
GLUCOSE SERPL-MCNC: 162 MG/DL — HIGH (ref 70–99)
HCT VFR BLD CALC: 23.9 % — LOW (ref 39–50)
HGB BLD-MCNC: 7.6 G/DL — LOW (ref 13–17)
IMM GRANULOCYTES NFR BLD AUTO: 3.9 % — HIGH (ref 0–0.9)
INR BLD: 1.06 RATIO — SIGNIFICANT CHANGE UP (ref 0.85–1.18)
LYMPHOCYTES # BLD AUTO: 0.13 K/UL — LOW (ref 1–3.3)
LYMPHOCYTES # BLD AUTO: 2.8 % — LOW (ref 13–44)
MAGNESIUM SERPL-MCNC: 1.9 MG/DL — SIGNIFICANT CHANGE UP (ref 1.6–2.6)
MCHC RBC-ENTMCNC: 30.9 PG — SIGNIFICANT CHANGE UP (ref 27–34)
MCHC RBC-ENTMCNC: 31.8 GM/DL — LOW (ref 32–36)
MCV RBC AUTO: 97.2 FL — SIGNIFICANT CHANGE UP (ref 80–100)
MONOCYTES # BLD AUTO: 0.42 K/UL — SIGNIFICANT CHANGE UP (ref 0–0.9)
MONOCYTES NFR BLD AUTO: 9.2 % — SIGNIFICANT CHANGE UP (ref 2–14)
NEUTROPHILS # BLD AUTO: 3.86 K/UL — SIGNIFICANT CHANGE UP (ref 1.8–7.4)
NEUTROPHILS NFR BLD AUTO: 84.1 % — HIGH (ref 43–77)
NRBC # BLD: 0 /100 WBCS — SIGNIFICANT CHANGE UP (ref 0–0)
PHOSPHATE SERPL-MCNC: 2.3 MG/DL — LOW (ref 2.5–4.5)
PLATELET # BLD AUTO: 177 K/UL — SIGNIFICANT CHANGE UP (ref 150–400)
POTASSIUM SERPL-MCNC: 4 MMOL/L — SIGNIFICANT CHANGE UP (ref 3.5–5.3)
POTASSIUM SERPL-SCNC: 4 MMOL/L — SIGNIFICANT CHANGE UP (ref 3.5–5.3)
PROTHROM AB SERPL-ACNC: 11.1 SEC — SIGNIFICANT CHANGE UP (ref 9.5–13)
RBC # BLD: 2.46 M/UL — LOW (ref 4.2–5.8)
RBC # FLD: 18.2 % — HIGH (ref 10.3–14.5)
SODIUM SERPL-SCNC: 138 MMOL/L — SIGNIFICANT CHANGE UP (ref 135–145)
SPECIMEN SOURCE: SIGNIFICANT CHANGE UP
TACROLIMUS SERPL-MCNC: 7.8 NG/ML — SIGNIFICANT CHANGE UP
WBC # BLD: 4.59 K/UL — SIGNIFICANT CHANGE UP (ref 3.8–10.5)
WBC # FLD AUTO: 4.59 K/UL — SIGNIFICANT CHANGE UP (ref 3.8–10.5)

## 2023-08-15 PROCEDURE — 99232 SBSQ HOSP IP/OBS MODERATE 35: CPT | Mod: GC

## 2023-08-15 RX ORDER — INSULIN GLARGINE 100 [IU]/ML
18 INJECTION, SOLUTION SUBCUTANEOUS AT BEDTIME
Refills: 0 | Status: DISCONTINUED | OUTPATIENT
Start: 2023-08-15 | End: 2023-08-18

## 2023-08-15 RX ORDER — INSULIN LISPRO 100/ML
10 VIAL (ML) SUBCUTANEOUS
Refills: 0 | Status: DISCONTINUED | OUTPATIENT
Start: 2023-08-15 | End: 2023-08-20

## 2023-08-15 RX ADMIN — Medication 81 MILLIGRAM(S): at 13:39

## 2023-08-15 RX ADMIN — Medication 4: at 13:38

## 2023-08-15 RX ADMIN — INSULIN GLARGINE 18 UNIT(S): 100 INJECTION, SOLUTION SUBCUTANEOUS at 22:26

## 2023-08-15 RX ADMIN — Medication 500000 UNIT(S): at 13:39

## 2023-08-15 RX ADMIN — Medication 2: at 08:36

## 2023-08-15 RX ADMIN — Medication 100 MILLIGRAM(S): at 21:55

## 2023-08-15 RX ADMIN — POLYETHYLENE GLYCOL 3350 17 GRAM(S): 17 POWDER, FOR SOLUTION ORAL at 05:42

## 2023-08-15 RX ADMIN — Medication 1 TABLET(S): at 13:39

## 2023-08-15 RX ADMIN — MYCOPHENOLATE MOFETIL 1000 MILLIGRAM(S): 250 CAPSULE ORAL at 20:06

## 2023-08-15 RX ADMIN — MYCOPHENOLATE MOFETIL 1000 MILLIGRAM(S): 250 CAPSULE ORAL at 08:35

## 2023-08-15 RX ADMIN — FAMOTIDINE 20 MILLIGRAM(S): 10 INJECTION INTRAVENOUS at 13:40

## 2023-08-15 RX ADMIN — Medication 20 MILLIGRAM(S): at 05:42

## 2023-08-15 RX ADMIN — CARVEDILOL PHOSPHATE 3.12 MILLIGRAM(S): 80 CAPSULE, EXTENDED RELEASE ORAL at 17:25

## 2023-08-15 RX ADMIN — TACROLIMUS 6 MILLIGRAM(S): 5 CAPSULE ORAL at 08:35

## 2023-08-15 RX ADMIN — Medication 12 UNIT(S): at 08:37

## 2023-08-15 RX ADMIN — Medication 500000 UNIT(S): at 17:26

## 2023-08-15 RX ADMIN — Medication 500000 UNIT(S): at 05:41

## 2023-08-15 RX ADMIN — Medication 100 MILLIGRAM(S): at 05:42

## 2023-08-15 RX ADMIN — CARVEDILOL PHOSPHATE 3.12 MILLIGRAM(S): 80 CAPSULE, EXTENDED RELEASE ORAL at 05:41

## 2023-08-15 RX ADMIN — Medication 500000 UNIT(S): at 21:56

## 2023-08-15 RX ADMIN — Medication 100 MILLIGRAM(S): at 13:43

## 2023-08-15 RX ADMIN — Medication 10 UNIT(S): at 17:43

## 2023-08-15 RX ADMIN — SENNA PLUS 2 TABLET(S): 8.6 TABLET ORAL at 21:56

## 2023-08-15 RX ADMIN — Medication 5 MILLIGRAM(S): at 21:56

## 2023-08-15 RX ADMIN — Medication 12 UNIT(S): at 13:38

## 2023-08-15 RX ADMIN — ATORVASTATIN CALCIUM 40 MILLIGRAM(S): 80 TABLET, FILM COATED ORAL at 21:56

## 2023-08-15 NOTE — DIETITIAN INITIAL EVALUATION ADULT - FACTORS AFF FOOD INTAKE
PT. CALLED TO LET YUDITH KNOW SHE IS SCHEDULED WITH DIABETIC APPT. Chidi 67 12-12-22. FYI NO NEED TO CALL BACK. none

## 2023-08-15 NOTE — DIETITIAN INITIAL EVALUATION ADULT - REASON FOR ADMISSION
Chart Reviewed, Events noted  " 52 y/o M with past medical history significant for HTN, CHF (s/p AICD 2016--last interrogated 7/15/23), CAD (s/p CABG 2015), IDDM, PVD s/p stent x4 in RLE with R big toe/second toe amputation (2021) and ESRD on HD via LUE AVF for 6 years now s/p DDRT 7/16/2023 with Thymoglobulin induction. Post-transplant course complicated by severe constipation requiring disimpaction, DGF requiring hemodialysis and LUE swelling with concern for L subclavian thrombus, now s/p IR fistulogram 7/7/23 with balloon angioplasty of subclavian vein. He was discharged home on 7/27/23 and remained on HD MWF at home [facility]. He presents from home via EMS to [facility] on 8/7/23 with constipation and continued DGF"

## 2023-08-15 NOTE — PROGRESS NOTE ADULT - PROBLEM SELECTOR PLAN 2
On Envarsus per goal level, Cellcept 1 gm BID, and prednisone 5 mg qd. Tacro trough noted 7.2 yesterday. Check tacrolimus trough 30 minutes prior to AM dose, goal: 8-10. Pred held while patient on pulse dose steroids 8/8- On Envarsus per goal level, Cellcept 1 gm BID, and prednisone 5 mg qd. Tacro trough noted 7.8 yesterday. Check tacrolimus trough 30 minutes prior to AM dose, goal: 8-10. Pred held while patient on pulse dose steroids 8/8-

## 2023-08-15 NOTE — PROGRESS NOTE ADULT - SUBJECTIVE AND OBJECTIVE BOX
Subjective: Patient seen and examined. No new events except as noted.     SUBJECTIVE/ROS:  No chest pain, dyspnea, palpitation, or dizziness.       MEDICATIONS:  MEDICATIONS  (STANDING):  aspirin enteric coated 81 milliGRAM(s) Oral daily  atorvastatin 40 milliGRAM(s) Oral at bedtime  bisacodyl 5 milliGRAM(s) Oral at bedtime  carvedilol 3.125 milliGRAM(s) Oral every 12 hours  ceFAZolin   IVPB 1000 milliGRAM(s) IV Intermittent every 8 hours  dextrose 5%. 1000 milliLiter(s) (50 mL/Hr) IV Continuous <Continuous>  dextrose 5%. 1000 milliLiter(s) (100 mL/Hr) IV Continuous <Continuous>  dextrose 50% Injectable 25 Gram(s) IV Push once  dextrose 50% Injectable 12.5 Gram(s) IV Push once  dextrose 50% Injectable 25 Gram(s) IV Push once  famotidine    Tablet 20 milliGRAM(s) Oral daily  glucagon  Injectable 1 milliGRAM(s) IntraMuscular once  insulin glargine Injectable (LANTUS) 20 Unit(s) SubCutaneous at bedtime  insulin lispro (ADMELOG) corrective regimen sliding scale   SubCutaneous three times a day before meals  insulin lispro (ADMELOG) corrective regimen sliding scale   SubCutaneous at bedtime  insulin lispro Injectable (ADMELOG) 12 Unit(s) SubCutaneous three times a day before meals  mycophenolate mofetil 1000 milliGRAM(s) Oral two times a day  nystatin    Suspension [id number] Unit(s) Oral four times a day  polyethylene glycol 3350 17 Gram(s) Oral two times a day  predniSONE   Tablet 20 milliGRAM(s) Oral daily  senna 2 Tablet(s) Oral at bedtime  tacrolimus ER Tablet (ENVARSUS XR) 6 milliGRAM(s) Oral <User Schedule>  trimethoprim   80 mG/sulfamethoxazole 400 mG 1 Tablet(s) Oral daily  valGANciclovir 450 milliGRAM(s) Oral <User Schedule>      PHYSICAL EXAM:  T(C): 36.7 (08-15-23 @ 05:53), Max: 37.2 (08-14-23 @ 22:00)  HR: 89 (08-15-23 @ 05:53) (87 - 96)  BP: 128/76 (08-15-23 @ 05:53) (90/45 - 138/71)  RR: 18 (08-15-23 @ 05:53) (18 - 20)  SpO2: 98% (08-15-23 @ 05:53) (96% - 100%)  Wt(kg): --  I&O's Summary    14 Aug 2023 07:01  -  15 Aug 2023 07:00  --------------------------------------------------------  IN: 720 mL / OUT: 1320 mL / NET: -600 mL            JVP: Normal  Neck: supple  Lung: clear   CV: S1 S2 , Murmur:  Abd: soft  Ext: No edema  neuro: Awake / alert  Psych: flat affect  Skin: normal``    LABS/DATA:    CARDIAC MARKERS:                                7.6    4.59  )-----------( 177      ( 15 Aug 2023 06:18 )             23.9     08-15    138  |  101  |  42<H>  ----------------------------<  162<H>  4.0   |  23  |  2.09<H>    Ca    9.0      15 Aug 2023 06:18  Phos  2.3     08-15  Mg     1.9     08-15      proBNP:   Lipid Profile:   HgA1c:   TSH:     TELE:  EKG:

## 2023-08-15 NOTE — PROGRESS NOTE ADULT - SUBJECTIVE AND OBJECTIVE BOX
Patient is a 53y old  Male who presents with a chief complaint of UTI, fecal impaction (15 Aug 2023 13:06)      DATE OF SERVICE: 08-15-23 @ 13:44    SUBJECTIVE / OVERNIGHT EVENTS: overnight events noted    ROS:  Resp: No cough no sputum production  CVS: No chest pain no palpitations no orthopnea  GI: no N/V/D  : no dysuria, no hematuria  "I feel fine'         MEDICATIONS  (STANDING):  aspirin enteric coated 81 milliGRAM(s) Oral daily  atorvastatin 40 milliGRAM(s) Oral at bedtime  bisacodyl 5 milliGRAM(s) Oral at bedtime  carvedilol 3.125 milliGRAM(s) Oral every 12 hours  ceFAZolin   IVPB 1000 milliGRAM(s) IV Intermittent every 8 hours  dextrose 5%. 1000 milliLiter(s) (50 mL/Hr) IV Continuous <Continuous>  dextrose 5%. 1000 milliLiter(s) (100 mL/Hr) IV Continuous <Continuous>  dextrose 50% Injectable 25 Gram(s) IV Push once  dextrose 50% Injectable 12.5 Gram(s) IV Push once  dextrose 50% Injectable 25 Gram(s) IV Push once  famotidine    Tablet 20 milliGRAM(s) Oral daily  glucagon  Injectable 1 milliGRAM(s) IntraMuscular once  insulin glargine Injectable (LANTUS) 20 Unit(s) SubCutaneous at bedtime  insulin lispro (ADMELOG) corrective regimen sliding scale   SubCutaneous three times a day before meals  insulin lispro (ADMELOG) corrective regimen sliding scale   SubCutaneous at bedtime  insulin lispro Injectable (ADMELOG) 12 Unit(s) SubCutaneous three times a day before meals  mycophenolate mofetil 1000 milliGRAM(s) Oral two times a day  nystatin    Suspension 990723 Unit(s) Oral four times a day  predniSONE   Tablet 20 milliGRAM(s) Oral daily  senna 2 Tablet(s) Oral at bedtime  tacrolimus ER Tablet (ENVARSUS XR) 6 milliGRAM(s) Oral <User Schedule>  trimethoprim   80 mG/sulfamethoxazole 400 mG 1 Tablet(s) Oral daily  valGANciclovir 450 milliGRAM(s) Oral <User Schedule>    MEDICATIONS  (PRN):  dextrose Oral Gel 15 Gram(s) Oral once PRN Blood Glucose LESS THAN 70 milliGRAM(s)/deciliter        CAPILLARY BLOOD GLUCOSE      POCT Blood Glucose.: 205 mg/dL (15 Aug 2023 13:32)  POCT Blood Glucose.: 172 mg/dL (15 Aug 2023 08:34)  POCT Blood Glucose.: 193 mg/dL (14 Aug 2023 21:16)  POCT Blood Glucose.: 232 mg/dL (14 Aug 2023 17:03)    I&O's Summary    14 Aug 2023 07:01  -  15 Aug 2023 07:00  --------------------------------------------------------  IN: 720 mL / OUT: 1320 mL / NET: -600 mL        Vital Signs Last 24 Hrs  T(C): 36.6 (15 Aug 2023 08:39), Max: 37.2 (14 Aug 2023 22:00)  T(F): 97.9 (15 Aug 2023 08:39), Max: 98.9 (14 Aug 2023 22:00)  HR: 81 (15 Aug 2023 08:39) (81 - 96)  BP: 142/70 (15 Aug 2023 08:39) (90/45 - 142/70)  BP(mean): --  RR: 18 (15 Aug 2023 08:39) (18 - 18)  SpO2: 98% (15 Aug 2023 08:39) (98% - 100%)    PHYSICAL EXAM:  EYES: EOMI, PERRLA  CHEST/LUNG: clear   HEART: S1 S2; no murmurs   ABDOMEN: Soft, Nontender  EXTREMITIES:  no edema  NEUROLOGY: AO x 3 non-focal  SKIN: No rashes or lesions    LABS:                        7.6    4.59  )-----------( 177      ( 15 Aug 2023 06:18 )             23.9     08-15    138  |  101  |  42<H>  ----------------------------<  162<H>  4.0   |  23  |  2.09<H>    Ca    9.0      15 Aug 2023 06:18  Phos  2.3     08-15  Mg     1.9     08-15      PT/INR - ( 15 Aug 2023 06:18 )   PT: 11.1 sec;   INR: 1.06 ratio         PTT - ( 15 Aug 2023 06:18 )  PTT:25.5 sec      Urinalysis Basic - ( 15 Aug 2023 06:18 )    Color: x / Appearance: x / SG: x / pH: x  Gluc: 162 mg/dL / Ketone: x  / Bili: x / Urobili: x   Blood: x / Protein: x / Nitrite: x   Leuk Esterase: x / RBC: x / WBC x   Sq Epi: x / Non Sq Epi: x / Bacteria: x          All consultant(s) notes reviewed and care discussed with other providers        Contact Number, Dr Murphy [phone]

## 2023-08-15 NOTE — DIETITIAN INITIAL EVALUATION ADULT - ADD RECOMMEND
1) Continue consistent carbohydrate renal diet. Defer diet/texture modification to medical team/SLP as indicated   2) Reinforce post-transplant nutrition therapy and food safety guidelines in-house and prior to discharge.   3) Discharge diet: Continue as above. Recommend follow up visit with Transplant MD and outpatient RD for dietary modifications as warranted.  4) Monitor PO intake,  Urine Output, GI tolerance, skin integrity,and labs. RD remains available if needed, pt is aware.

## 2023-08-15 NOTE — PROGRESS NOTE ADULT - SUBJECTIVE AND OBJECTIVE BOX
Transplant Surgery - Multidisciplinary Progress Note  --------------------------------------------------------------  DDRT 7/16/2023    Present:   Patient seen and examined with multidisciplinary Transplant team including  Surgeon: Dr. Delacruz, SHERIE Hughes,  Nephrologist: Dr. BHUMIKA Corado and unit RN during am rounds.  Disciplines not in attendance will be notified of the plan.     HPI: Tee Salvador is a 54 y/o M with past medical history significant for HTN, CHF (s/p AICD 2016--last interrogated 7/15/23), CAD (s/p CABG 2015), IDDM, PVD s/p stent x4 in RLE with R big toe/second toe amputation (2021) and ESRD on HD via LUE AVF for 6 years now s/p DDRT 7/16/2023 with Thymoglobulin induction. Post-transplant course complicated by severe constipation requiring disimpaction, DGF requiring hemodialysis and LUE swelling with concern for L subclavian thrombus, now s/p IR fistulogram 7/7/23 with balloon angioplasty of subclavian vein. He was discharged home on 7/27/23 and remained on HD MWF at home center [facility]. He presents from home via EMS to [facility] on 8/7/23 with constipation and continued DGF    Interval Events:  Afebrile, VSS on ABX  improving graft function daily  bx with concern for AMR, tolerating PLEX/IVIG/steroids  no complaints at this time  LIANA x1 still in place from OR    Immunosuppression:  Envarsus per level  MMF 1/1  pulse steroids with taper    Potential Discharge date: TBD    Education:  Medications    Plan of care:  See Below        MEDICATIONS  (STANDING):  aspirin enteric coated 81 milliGRAM(s) Oral daily  atorvastatin 40 milliGRAM(s) Oral at bedtime  bisacodyl 5 milliGRAM(s) Oral at bedtime  carvedilol 3.125 milliGRAM(s) Oral every 12 hours  ceFAZolin   IVPB 1000 milliGRAM(s) IV Intermittent every 8 hours  dextrose 5%. 1000 milliLiter(s) (50 mL/Hr) IV Continuous <Continuous>  dextrose 5%. 1000 milliLiter(s) (100 mL/Hr) IV Continuous <Continuous>  dextrose 50% Injectable 12.5 Gram(s) IV Push once  dextrose 50% Injectable 25 Gram(s) IV Push once  dextrose 50% Injectable 25 Gram(s) IV Push once  famotidine    Tablet 20 milliGRAM(s) Oral daily  glucagon  Injectable 1 milliGRAM(s) IntraMuscular once  insulin glargine Injectable (LANTUS) 20 Unit(s) SubCutaneous at bedtime  insulin lispro (ADMELOG) corrective regimen sliding scale   SubCutaneous at bedtime  insulin lispro (ADMELOG) corrective regimen sliding scale   SubCutaneous three times a day before meals  insulin lispro Injectable (ADMELOG) 12 Unit(s) SubCutaneous three times a day before meals  mycophenolate mofetil 1000 milliGRAM(s) Oral two times a day  nystatin    Suspension 751123 Unit(s) Oral four times a day  polyethylene glycol 3350 17 Gram(s) Oral two times a day  senna 2 Tablet(s) Oral at bedtime  sodium phosphate 30 milliMole(s)/500 mL IVPB 30 milliMole(s) IV Intermittent once  tacrolimus ER Tablet (ENVARSUS XR) 6 milliGRAM(s) Oral <User Schedule>  trimethoprim   80 mG/sulfamethoxazole 400 mG 1 Tablet(s) Oral daily  valGANciclovir 450 milliGRAM(s) Oral <User Schedule>    MEDICATIONS  (PRN):  dextrose Oral Gel 15 Gram(s) Oral once PRN Blood Glucose LESS THAN 70 milliGRAM(s)/deciliter      PAST MEDICAL & SURGICAL HISTORY:  Diabetes mellitus  type 2      Foot ulcer due to secondary DM      Coronary artery disease      Acute on chronic systolic heart failure      H/O kidney transplant      Breast Reduction  at age 18      Toe amputation status, left      S/P CABG (coronary artery bypass graft)      AICD (automatic cardioverter/defibrillator) present          Vital Signs Last 24 Hrs  Vital Signs Last 24 Hrs  T(C): 36.6 (15 Aug 2023 08:39), Max: 37.2 (14 Aug 2023 22:00)  T(F): 97.9 (15 Aug 2023 08:39), Max: 98.9 (14 Aug 2023 22:00)  HR: 81 (15 Aug 2023 08:39) (81 - 96)  BP: 142/70 (15 Aug 2023 08:39) (90/45 - 142/70)  BP(mean): --  RR: 18 (15 Aug 2023 08:39) (18 - 20)  SpO2: 98% (15 Aug 2023 08:39) (96% - 100%)    Parameters below as of 15 Aug 2023 08:39  Patient On (Oxygen Delivery Method): room air    I&O's Summary    14 Aug 2023 07:01  -  15 Aug 2023 07:00  --------------------------------------------------------  IN: 720 mL / OUT: 1320 mL / NET: -600 mL                              7.6    4.59  )-----------( 177      ( 15 Aug 2023 06:18 )             23.9     08-15    138  |  101  |  42<H>  ----------------------------<  162<H>  4.0   |  23  |  2.09<H>    Ca    9.0      15 Aug 2023 06:18  Phos  2.3     08-15  Mg     1.9     08-15      Review of systems  Gen: No weight changes, fatigue, fevers/chills, weakness  Skin: No rashes  Head/Eyes/Ears/Mouth: No headache; Normal hearing; Normal vision w/o blurriness; No sinus pain/discomfort, sore throat  Respiratory: No dyspnea, cough, wheezing, hemoptysis  CV: No chest pain, PND, orthopnea  GI: no abdominal pain, denies diarrhea, constipation, nausea, vomiting, melena, hematochezia  : No increased frequency, dysuria, hematuria, nocturia  MSK: No joint pain/swelling; no back pain; no edema  Neuro: No dizziness/lightheadedness, weakness, seizures, numbness, tingling  Heme: No easy bruising or bleeding  Endo: No heat/cold intolerance  Psych: No significant nervousness, anxiety, stress, depression  All other systems were reviewed and are negative, except as noted.      PHYSICAL EXAM:  Constitutional: Well developed / well nourished  Eyes: Anicteric, PERRLA  ENMT: nc/at  Neck: supple  Respiratory: CTA B/L  Cardiovascular: RRR  Gastrointestinal: Soft, ND/NT. RLQ incision with some staples still in place . LIANA serous  Genitourinary: Voiding spontaneously  Extremities: SCD's in place and working bilaterally  Vascular: Palpable dp pulses bilaterally  Neurological: A&O x3  Skin: no rashes, ulcerations or lesions;  Musculoskeletal: Moving all extremities  Psychiatric: Responsive

## 2023-08-15 NOTE — DIETITIAN INITIAL EVALUATION ADULT - PHYSCIAL ASSESSMENT
Weights:  --Drug Dosing Weight: 91.7 kg/ 202.2 pounds (8/11/23)  --Daily Weights: 94.6 kg/ 208.6 pounds (8/15/23), 93.2 kg/ 205.5 pounds  (8/14/23),92.7 kg/ 204.4 pounds  (8/13/23), 92.7 kg/ 204.4 pounds  (8/12/23)  -- Weight History per [facility]: 96.6 kg/ 213 pounds (7/15/23), 95.3 kg/ 210.1 pounds (5/26/23), 91.6 kg/ 202 pounds (7/20/22)  -- No  significant weight changes noted over the past ~ 13 months, Will monitor weight stable as able.  -- IBW: 160 pounds %IBW: 130%

## 2023-08-15 NOTE — PROGRESS NOTE ADULT - PROBLEM SELECTOR PLAN 1
Pt. with h/o ESRD on HD. Underwent DDRT on 7/16/23. Post-transplant course significant for DGF. Still receiving iHD at [facility], last outpatient HD was 8/4/23. HD consent obtained from patient and placed in chart. Last HD done Mon 8/7. Renal biopsy done, showing AMR and diabetic nephrosclerosis (g1, ptc0-1 c4d 0).     Pt. is non-oliguric. Pt. clinically stable. Labs reviewed. Continue with immunosuppression regimen, as outlined below. Monitor labs and urine output. Avoid nephrotoxins. Dose medications as per eGFR. Plan for PLEX on 8/11, 8/12, and 8/14    On antibiotics for klebsiella UTI, sensitive to ceftriaxone (8/7-8/10); then on Cefazolin until 8/16 Pt. with h/o ESRD on HD. Underwent DDRT on 7/16/23. Post-transplant course significant for DGF. Still receiving iHD at [facility], last outpatient HD was 8/4/23. HD consent obtained from patient and placed in chart. Last HD done Mon 8/7. Renal biopsy done, showing AMR and diabetic nephrosclerosis (g1, ptc0-1 c4d 0).     Pt. is non-oliguric. Pt. clinically stable. Labs reviewed. Continue with immunosuppression regimen, as outlined below. Monitor labs and urine output. Avoid nephrotoxins. Dose medications as per eGFR. Plan for PLEX on 8/11, 8/12, and 8/14. Repeat DSA today. Will plan for Rituximab on Friday    On antibiotics for klebsiella UTI, sensitive to ceftriaxone (8/7-8/10); then on Cefazolin until 8/16

## 2023-08-15 NOTE — PROGRESS NOTE ADULT - ASSESSMENT
CAD s/p cabg  stable   asa, statin    chronic systolic chf  stable  improving LV function   on coreg   off ace/arb/arni for now   s/p ICD    ESRD  s/p transplant   fu with transplant team   crt improving

## 2023-08-15 NOTE — PROGRESS NOTE ADULT - ASSESSMENT
Tee Salvador is a 54 y/o M with past medical history significant for HTN, CHF (s/p AICD 2016--last interrogated 7/15/23), CAD (s/p CABG 2015), IDDM, PVD s/p stent x4 in RLE with R big toe/second toe amputation (2021) and ESRD on HD via LUE AVF for 6 years now s/p DDRT 7/16/2023 with Thymoglobulin induction. Post-transplant course complicated by severe constipation requiring disimpaction, DGF requiring hemodialysis and LUE swelling with concern for L subclavian thrombus, now s/p IR fistulogram 7/7/23 with balloon angioplasty of subclavian vein. He was discharged home on 7/27/23 and remained on HD MWF at home center [facility]. He presents from home via EMS to [facility] on 8/7/23 with constipation and continued DGF    DGF s/p DDRT 7/16/23  -S/P IR biopsy on 8/10 with concern for AMR. will continue PLEX/IVIG (plan for 5 sessions total, PLEX/IVIG#3 tomorrow 8/16), plan for Rituximab Friday 8/18  -DSA+ : will continue pulse dose steroids with taper (500 on 8/8, 250 on 8/9, 125 on 8/10, 60 on 8/12).  Prednisone 20 mg daily  -graft function improving  -last HD on 8/7, d/c furosemide  -UTI: Klebsiella, CTX-->Ancef (total of 7D per ID), last day on 8/16, follow up repeat Ucx  -BCx negative  -LIANA to continue, LIANA Cr negative  -bowel regimen  -diet as tolerated  -SCDs  -last epogen 8/11, stable h/h. continue ASA    Immunosuppression  -Envarsus per level  -MMF 1/1  -steroids as above  -PPx regimen: Valcyte/Bactrim/Nystatin/PPI with renal dosing    DM  -Lantus/Lispro adjust while on steroids

## 2023-08-15 NOTE — DIETITIAN INITIAL EVALUATION ADULT - PERTINENT LABORATORY DATA
08-15    138  |  101  |  42<H>  ----------------------------<  162<H>  4.0   |  23  |  2.09<H>    Ca    9.0      15 Aug 2023 06:18  Phos  2.3     08-15  Mg     1.9     08-15    POCT Blood Glucose.: 172 mg/dL (08-15-23 @ 08:34)  POCT Blood Glucose.: 193 mg/dL (08-14-23 @ 21:16)  POCT Blood Glucose.: 232 mg/dL (08-14-23 @ 17:03)  POCT Blood Glucose.: 186 mg/dL (08-14-23 @ 12:16)    A1C with Estimated Average Glucose Result: 5.8 % (07-26-23 @ 07:25)

## 2023-08-15 NOTE — DIETITIAN INITIAL EVALUATION ADULT - ORAL INTAKE PTA/DIET HISTORY
Pt confirms no known food allergies/intolerances. Reports having a good appetite and PO intakes at home. Reports following a renal diet. Pt denies nausea, vomiting, diarrhea, or constipation. Denies difficulty chewing/swallowing.

## 2023-08-15 NOTE — DIETITIAN INITIAL EVALUATION ADULT - OTHER INFO
Pt is s/p recent DDRT 7/16/23:  -- Deltasone,  Solu-medrol, Tacrolimus, Cellcept transplant meds ordered  -- BG management: SSI, Lantus 20 U at bedtime, Lispro 10 U 3x/day before mels  -- Urine output per flow sheets 0ml thus far (8/15), 1,250 ml (8/14), 1,480 ml (8/13)  -- Hyperphosphatemia; resolved, continue renal diet restriction for now

## 2023-08-15 NOTE — PROGRESS NOTE ADULT - SUBJECTIVE AND OBJECTIVE BOX
[facility]   MD DANIEL WALDEN MD BRIANA PETITO, NP    TEL:  FROM 9 AM to 5 PM ---OFFICE: [phone]  FROM 5 PM - 9 AM PLEASE CALL ANSWERING SERVICE: [phone]     RENAL PROGRESS NOTE: DATE OF SERVICE 08-15-23 @ 13:06    52 y/o M with past medical history significant for HTN, CHF (s/p AICD 2016--last interrogated 7/15/23), CAD (s/p CABG 2015), IDDM, PVD s/p stent x4 in RLE with R big toe/second toe amputation (2021) and ESRD on HD via LUE AVF for 6 years now s/p DDRT 7/16/2023 with Thymoglobulin induction. Post-transplant course complicated by severe constipation requiring disimpaction, DGF requiring hemodialysis and LUE swelling with concern for L subclavian thrombus, now s/p IR fistulogram 7/7/23 with balloon angioplasty of subclavian vein. He was discharged home on 7/27/23 and remained on HD MWF at home center [facility]. He presents from home via EMS to [facility] on 8/7/23 with constipation and continued DGF".     Patient seen and examined at bedside. No chest pain/sob    VITALS:  T(F): 97.9 (08-15-23 @ 08:39), Max: 98.9 (08-14-23 @ 22:00)  HR: 81 (08-15-23 @ 08:39)  BP: 142/70 (08-15-23 @ 08:39)  RR: 18 (08-15-23 @ 08:39)  SpO2: 98% (08-15-23 @ 08:39)  Wt(kg): --    08-14 @ 07:01  -  08-15 @ 07:00  --------------------------------------------------------  IN: 720 mL / OUT: 1320 mL / NET: -600 mL      PHYSICAL EXAM:  Constitutional: NAD  Neck: No JVD  Respiratory: CTAB, no wheezes, rales or rhonchi  Cardiovascular: S1, S2, RRR  Gastrointestinal: BS+, soft, NT/ND  Extremities: No peripheral edema  Access: L AVF -- palpable thrill, bruit auscultated       Hospital Medications:   MEDICATIONS  (STANDING):  aspirin enteric coated 81 milliGRAM(s) Oral daily  atorvastatin 40 milliGRAM(s) Oral at bedtime  bisacodyl 5 milliGRAM(s) Oral at bedtime  carvedilol 3.125 milliGRAM(s) Oral every 12 hours  ceFAZolin   IVPB 1000 milliGRAM(s) IV Intermittent every 8 hours  dextrose 5%. 1000 milliLiter(s) (50 mL/Hr) IV Continuous <Continuous>  dextrose 5%. 1000 milliLiter(s) (100 mL/Hr) IV Continuous <Continuous>  dextrose 50% Injectable 25 Gram(s) IV Push once  dextrose 50% Injectable 12.5 Gram(s) IV Push once  dextrose 50% Injectable 25 Gram(s) IV Push once  famotidine    Tablet 20 milliGRAM(s) Oral daily  glucagon  Injectable 1 milliGRAM(s) IntraMuscular once  insulin glargine Injectable (LANTUS) 20 Unit(s) SubCutaneous at bedtime  insulin lispro (ADMELOG) corrective regimen sliding scale   SubCutaneous three times a day before meals  insulin lispro (ADMELOG) corrective regimen sliding scale   SubCutaneous at bedtime  insulin lispro Injectable (ADMELOG) 12 Unit(s) SubCutaneous three times a day before meals  mycophenolate mofetil 1000 milliGRAM(s) Oral two times a day  nystatin    Suspension 507312 Unit(s) Oral four times a day  predniSONE   Tablet 20 milliGRAM(s) Oral daily  senna 2 Tablet(s) Oral at bedtime  tacrolimus ER Tablet (ENVARSUS XR) 6 milliGRAM(s) Oral <User Schedule>  trimethoprim   80 mG/sulfamethoxazole 400 mG 1 Tablet(s) Oral daily  valGANciclovir 450 milliGRAM(s) Oral <User Schedule>    Tacrolimus (), Serum: 7.8 ng/mL (08-15 @ 06:18)    LABS:  08-15    138  |  101  |  42<H>  ----------------------------<  162<H>  4.0   |  23  |  2.09<H>    Ca    9.0      15 Aug 2023 06:18  Phos  2.3     08-15  Mg     1.9     08-15      Creatinine Trend: 2.09 <--, 2.30 <--, 2.35 <--, 2.54 <--, 3.35 <--, 3.72 <--, 3.45 <--    Phosphorus: 2.3 mg/dL (08-15 @ 06:18)                              7.6    4.59  )-----------( 177      ( 15 Aug 2023 06:18 )             23.9     Urine Studies:  Urinalysis - [08-15-23 @ 06:18]      Color  / Appearance  / SG  / pH       Gluc 162 / Ketone   / Bili  / Urobili        Blood  / Protein  / Leuk Est  / Nitrite       RBC  / WBC  / Hyaline  / Gran  / Sq Epi  / Non Sq Epi  / Bacteria             RADIOLOGY & ADDITIONAL STUDIES:

## 2023-08-15 NOTE — DIETITIAN INITIAL EVALUATION ADULT - REASON INDICATOR FOR ASSESSMENT
Length of Stay  Information obtained from: Review of pt's current medical record, interview with pt in his assigned room on 6MONTI; Pt known to RD from covering kidney transplant service line.

## 2023-08-15 NOTE — DIETITIAN INITIAL EVALUATION ADULT - NSFNSADHERENCEPTAFT_GEN_A_CORE
-- Pt confirms history of diabetes, Most recent A1c= 5.8 % (07-26-23) indicates good glycemic control. Pt took Januvia at home for medication management.

## 2023-08-15 NOTE — DIETITIAN INITIAL EVALUATION ADULT - FLUID ACCUMULATION
-- No edema noted per flow sheets  Hemigard Intro: Due to skin fragility and wound tension, it was decided to use HEMIGARD adhesive retention suture devices to permit a linear closure. The skin was cleaned and dried for a 6cm distance away from the wound. Excessive hair, if present, was removed to allow for adhesion.

## 2023-08-15 NOTE — PROGRESS NOTE ADULT - PROBLEM SELECTOR PLAN 3
Pt. with anemia in the setting of ESRD/DGF. Hgb below goal. Check iron studies. Will order epo with HD sessions once weight is updated. Monitor hgb. Pt. with anemia in the setting of ESRD/DGF. Hgb below goal. Check iron studies. Epo 10k given on 8/11

## 2023-08-15 NOTE — PROGRESS NOTE ADULT - ASSESSMENT
54 yo M with hx of DM2 CAD, CHF, s/p CABG 2015, AICD (2016), on hemodialysis for approximately 6 years via L AVF (nephrologist Dr. Bharath Romo), He has hx of PVD, is s/p 4 stents in R leg (mid and distal right superficial femoral artery, right popliteal x2 on 1/14/22).; is sp RT big toe and second toe amputation 2021, on asa and plavix for severe PVD (per pt). Now here for possible DDRT. Pt reports does not make urine only few drops occasionally. Pt denies N/V/D, fevers/chills, CP, SOB. Pt reports last ate at 4pm today and had HD yesterday 7/14/23.  Nephrology consulted for ESRD s/p DDRT.      s/p DDRT @ [facility] 7/16/2023   Initially received Simulect induction -- Changed to Thymoglobulin given delayed graft function.  Pt discharged on previous admission on HD.   Post transport course significant for DGF  Via L AVF --> s/p Left Radiocephalic fistulogram and subclavian vein angioplasty with Vascular 7/24/2023  Consent obtained from patient and placed in chart.  s/p CT A& P No contrast 8/7/23 --> partially imaged L pleural effusion, unchanged. Partially  imaged LLL opacity again seen.  small amount of dependent air within urinary bladder, small renal cysts, left transplant kidney with mil hydro. Stent extends from kidney to bladder.   s/p Doppler Renal US Transplant kidney 8/7/23 --> patent renal vasculature. Elevated renal artery peak systolic velocities. Concern for possible renal artery stenosis at anastomosis. Mild elevation  of intrarenal resistive indices , grossly unchanged from prior exam.   Continue Immunosuppression per transplant : Envarsus per level , Cellcept 1 gm bid, and pulse steroids, prednisone 20 mg qd  Tacro trough is 7.8 on 8/15/23    s/p Transplant renal biopsy 8/09 --Renal biopsy  showing AMR and diabetic nephrosclerosis (g1, ptc0-1 c4d 0). Plan for total 5 sessions of PLEX, s/p PLEX on  8/11, 8/12. #3 tomorrow 8/16, plan for Rituximab Friday 8/18  Monitor labs and urine output. Avoid nephrotoxins  HD per transplant, last HD 8/7 . Pt off Furosemide.   Dose medications as per eGFR.  Renal Diet   Monitor     Anemia  Mgmt per Transplant - planning for LUX with HD  Maintain Hgb > 8  Monitor     HTN:   BP fluctuates   Monitor     Proteinuria / Hematuria   likely in setting of + LE, bacteria  Urine cx + Klebsiella   On Cefazolin  Mgmt per Transplant ID

## 2023-08-15 NOTE — PROGRESS NOTE ADULT - SUBJECTIVE AND OBJECTIVE BOX
[facility] DIVISION OF KIDNEY DISEASES AND HYPERTENSION -- FOLLOW UP NOTE  --------------------------------------------------------------------------------  Chief Complaint: history of DDRT on 7/16/23    HPI:   52 yo M with h/o ESRD on HD x6 years via LUE AVF (outpatient nephrologist: Dr. Shaka Sanchez), underwent DDRT on 7/16/23, DM2 CAD, CHF, underwent CABG 2015, AICD (2016), and severe PVD (underwent R big toe and second toe amputation in 2021) here for abdominal pain and constipation. Patient had a DGF post op and is still continuing to require iHD. Goes to [facility]. Last iHD was Friday, Aug 4th. Patient was planned to undergo a renal biopsy this week but had to be brought in by EMS for abdominal discomfort and constipation. Patient also with reduced intake of PO. Still makes urine. s/p renal biopsy on 8/9 showing AMR and diabetic nephrosclerosis (g1, PTC0-1, C4d 0). Total of 5 PLEX sessions, */11, 8/12,8/14.     24 hour events/subjective:  Patient seen this am, sitting up in chair. Complains of having frequent bowel movements. Denies any headaches, fevers/chills, chest pain, palpitations, SOB, and leg swelling.        PAST HISTORY  --------------------------------------------------------------------------------  No significant changes to PMH, PSH, FHx, SHx, unless otherwise noted    ALLERGIES & MEDICATIONS  --------------------------------------------------------------------------------  Allergies    Contrast. (Unknown)    Intolerances      Standing Inpatient Medications  aspirin enteric coated 81 milliGRAM(s) Oral daily  atorvastatin 40 milliGRAM(s) Oral at bedtime  bisacodyl 5 milliGRAM(s) Oral at bedtime  carvedilol 3.125 milliGRAM(s) Oral every 12 hours  ceFAZolin   IVPB 1000 milliGRAM(s) IV Intermittent every 8 hours  dextrose 5%. 1000 milliLiter(s) IV Continuous <Continuous>  dextrose 5%. 1000 milliLiter(s) IV Continuous <Continuous>  dextrose 50% Injectable 25 Gram(s) IV Push once  dextrose 50% Injectable 12.5 Gram(s) IV Push once  dextrose 50% Injectable 25 Gram(s) IV Push once  famotidine    Tablet 20 milliGRAM(s) Oral daily  glucagon  Injectable 1 milliGRAM(s) IntraMuscular once  insulin glargine Injectable (LANTUS) 20 Unit(s) SubCutaneous at bedtime  insulin lispro (ADMELOG) corrective regimen sliding scale   SubCutaneous three times a day before meals  insulin lispro (ADMELOG) corrective regimen sliding scale   SubCutaneous at bedtime  insulin lispro Injectable (ADMELOG) 12 Unit(s) SubCutaneous three times a day before meals  mycophenolate mofetil 1000 milliGRAM(s) Oral two times a day  nystatin    Suspension 939928 Unit(s) Oral four times a day  polyethylene glycol 3350 17 Gram(s) Oral two times a day  predniSONE   Tablet 20 milliGRAM(s) Oral daily  senna 2 Tablet(s) Oral at bedtime  tacrolimus ER Tablet (ENVARSUS XR) 6 milliGRAM(s) Oral <User Schedule>  trimethoprim   80 mG/sulfamethoxazole 400 mG 1 Tablet(s) Oral daily  valGANciclovir 450 milliGRAM(s) Oral <User Schedule>    PRN Inpatient Medications  dextrose Oral Gel 15 Gram(s) Oral once PRN      REVIEW OF SYSTEMS  --------------------------------------------------------------------------------  per above     VITALS/PHYSICAL EXAM  --------------------------------------------------------------------------------  T(C): 36.7 (08-15-23 @ 05:53), Max: 37.2 (08-14-23 @ 22:00)  HR: 89 (08-15-23 @ 05:53) (87 - 96)  BP: 128/76 (08-15-23 @ 05:53) (90/45 - 138/71)  RR: 18 (08-15-23 @ 05:53) (18 - 20)  SpO2: 98% (08-15-23 @ 05:53) (96% - 100%)  Wt(kg): --        08-14-23 @ 07:01  -  08-15-23 @ 07:00  --------------------------------------------------------  IN: 720 mL / OUT: 1320 mL / NET: -600 mL      Physical Exam:  	Gen: NAD  	HEENT: PERRL, supple neck, clear oropharynx  	Pulm: CTA B/L  	CV: RRR, S1S2; no rub  	Back: No spinal or CVA tenderness; no sacral edema  	Abd: +BS, soft, nontender/nondistended                      Transplant: No tenderness, swelling  	: No suprapubic tenderness  	UE: Warm, FROM; no edema; no asterixis  	LE: Warm, FROM; no edema  	Neuro: No focal deficits  	Psych: Normal affect and mood  	Skin: Warm, without rashes    LABS/STUDIES  --------------------------------------------------------------------------------              7.6    4.59  >-----------<  177      [08-15-23 @ 06:18]              23.9     138  |  101  |  42  ----------------------------<  162      [08-15-23 @ 06:18]  4.0   |  23  |  2.09        Ca     9.0     [08-15-23 @ 06:18]      iCa    1.28     [08-15 @ 06:18]      Mg     1.9     [08-15-23 @ 06:18]      Phos  2.3     [08-15-23 @ 06:18]      PT/INR: PT 11.1 , INR 1.06       [08-15-23 @ 06:18]  PTT: 25.5       [08-15-23 @ 06:18]      Creatinine Trend:  SCr 2.09 [08-15 @ 06:18]  SCr 2.30 [08-14 @ 06:15]  SCr 2.35 [08-13 @ 06:00]  SCr 2.54 [08-12 @ 06:55]  SCr 3.35 [08-11 @ 07:21]    Tacrolimus (), Serum: 8.3 ng/mL (08-14 @ 06:17)  Tacrolimus (), Serum: 8.6 ng/mL (08-13 @ 06:02)  Tacrolimus (), Serum: 4.7 ng/mL (08-12 @ 06:57)  Tacrolimus (), Serum: 6.2 ng/mL (08-11 @ 07:29)            Urinalysis - [08-15-23 @ 06:18]      Color  / Appearance  / SG  / pH       Gluc 162 / Ketone   / Bili  / Urobili        Blood  / Protein  / Leuk Est  / Nitrite       RBC  / WBC  / Hyaline  / Gran  / Sq Epi  / Non Sq Epi  / Bacteria              [facility] DIVISION OF KIDNEY DISEASES AND HYPERTENSION -- FOLLOW UP NOTE  --------------------------------------------------------------------------------  Chief Complaint: history of DDRT on 7/16/23    HPI:   54 yo M with h/o ESRD on HD x6 years via LUE AVF (outpatient nephrologist: Dr. Shaka Sanchez), underwent DDRT on 7/16/23, DM2 CAD, CHF, underwent CABG 2015, AICD (2016), and severe PVD (underwent R big toe and second toe amputation in 2021) here for abdominal pain and constipation. Patient had a DGF post op and is still continuing to require iHD. Goes to [facility]. Last iHD was Friday, Aug 4th. Patient was planned to undergo a renal biopsy this week but had to be brought in by EMS for abdominal discomfort and constipation. Patient also with reduced intake of PO. Still makes urine. s/p renal biopsy on 8/9 showing AMR and diabetic nephrosclerosis (g1, PTC0-1, C4d 0). Total of 5 PLEX sessions, */11, 8/12,8/14.     24 hour events/subjective:  Patient seen this am, sitting up in chair. Complains of having frequent bowel movements. Denies any headaches, fevers/chills, chest pain, palpitations, SOB, and leg swelling.        PAST HISTORY  --------------------------------------------------------------------------------  No significant changes to PMH, PSH, FHx, SHx, unless otherwise noted    ALLERGIES & MEDICATIONS  --------------------------------------------------------------------------------  Allergies    Contrast. (Unknown)    Intolerances      Standing Inpatient Medications  aspirin enteric coated 81 milliGRAM(s) Oral daily  atorvastatin 40 milliGRAM(s) Oral at bedtime  bisacodyl 5 milliGRAM(s) Oral at bedtime  carvedilol 3.125 milliGRAM(s) Oral every 12 hours  ceFAZolin   IVPB 1000 milliGRAM(s) IV Intermittent every 8 hours  dextrose 5%. 1000 milliLiter(s) IV Continuous <Continuous>  dextrose 5%. 1000 milliLiter(s) IV Continuous <Continuous>  dextrose 50% Injectable 25 Gram(s) IV Push once  dextrose 50% Injectable 12.5 Gram(s) IV Push once  dextrose 50% Injectable 25 Gram(s) IV Push once  famotidine    Tablet 20 milliGRAM(s) Oral daily  glucagon  Injectable 1 milliGRAM(s) IntraMuscular once  insulin glargine Injectable (LANTUS) 20 Unit(s) SubCutaneous at bedtime  insulin lispro (ADMELOG) corrective regimen sliding scale   SubCutaneous three times a day before meals  insulin lispro (ADMELOG) corrective regimen sliding scale   SubCutaneous at bedtime  insulin lispro Injectable (ADMELOG) 12 Unit(s) SubCutaneous three times a day before meals  mycophenolate mofetil 1000 milliGRAM(s) Oral two times a day  nystatin    Suspension 963474 Unit(s) Oral four times a day  polyethylene glycol 3350 17 Gram(s) Oral two times a day  predniSONE   Tablet 20 milliGRAM(s) Oral daily  senna 2 Tablet(s) Oral at bedtime  tacrolimus ER Tablet (ENVARSUS XR) 6 milliGRAM(s) Oral <User Schedule>  trimethoprim   80 mG/sulfamethoxazole 400 mG 1 Tablet(s) Oral daily  valGANciclovir 450 milliGRAM(s) Oral <User Schedule>    PRN Inpatient Medications  dextrose Oral Gel 15 Gram(s) Oral once PRN      REVIEW OF SYSTEMS  --------------------------------------------------------------------------------  per above     VITALS/PHYSICAL EXAM  --------------------------------------------------------------------------------  T(C): 36.7 (08-15-23 @ 05:53), Max: 37.2 (08-14-23 @ 22:00)  HR: 89 (08-15-23 @ 05:53) (87 - 96)  BP: 128/76 (08-15-23 @ 05:53) (90/45 - 138/71)  RR: 18 (08-15-23 @ 05:53) (18 - 20)  SpO2: 98% (08-15-23 @ 05:53) (96% - 100%)  Wt(kg): --        08-14-23 @ 07:01  -  08-15-23 @ 07:00  --------------------------------------------------------  IN: 720 mL / OUT: 1320 mL / NET: -600 mL      Physical Exam:  	Gen: NAD  	HEENT: PERRL, supple neck, clear oropharynx  	Pulm: CTA B/L  	CV: RRR, S1S2; no rub  	Back: No spinal or CVA tenderness; no sacral edema  	Abd: +BS, soft, nontender/nondistended                      Transplant: No tenderness, swelling  	: No suprapubic tenderness  	UE: Warm, FROM; no edema; no asterixis  	LE: Warm, FROM; no edema  	Neuro: No focal deficits  	Psych: Normal affect and mood  	Skin: Warm, without rashes      LABS/STUDIES  --------------------------------------------------------------------------------              7.6    4.59  >-----------<  177      [08-15-23 @ 06:18]              23.9     138  |  101  |  42  ----------------------------<  162      [08-15-23 @ 06:18]  4.0   |  23  |  2.09        Ca     9.0     [08-15-23 @ 06:18]      iCa    1.28     [08-15 @ 06:18]      Mg     1.9     [08-15-23 @ 06:18]      Phos  2.3     [08-15-23 @ 06:18]      PT/INR: PT 11.1 , INR 1.06       [08-15-23 @ 06:18]  PTT: 25.5       [08-15-23 @ 06:18]      Creatinine Trend:  SCr 2.09 [08-15 @ 06:18]  SCr 2.30 [08-14 @ 06:15]  SCr 2.35 [08-13 @ 06:00]  SCr 2.54 [08-12 @ 06:55]  SCr 3.35 [08-11 @ 07:21]    Tacrolimus (), Serum: 8.3 ng/mL (08-14 @ 06:17)  Tacrolimus (), Serum: 8.6 ng/mL (08-13 @ 06:02)  Tacrolimus (), Serum: 4.7 ng/mL (08-12 @ 06:57)  Tacrolimus (), Serum: 6.2 ng/mL (08-11 @ 07:29)            Urinalysis - [08-15-23 @ 06:18]      Color  / Appearance  / SG  / pH       Gluc 162 / Ketone   / Bili  / Urobili        Blood  / Protein  / Leuk Est  / Nitrite       RBC  / WBC  / Hyaline  / Gran  / Sq Epi  / Non Sq Epi  / Bacteria

## 2023-08-15 NOTE — DIETITIAN INITIAL EVALUATION ADULT - PERTINENT MEDS FT
MEDICATIONS  (STANDING):  aspirin enteric coated 81 milliGRAM(s) Oral daily  atorvastatin 40 milliGRAM(s) Oral at bedtime  bisacodyl 5 milliGRAM(s) Oral at bedtime  carvedilol 3.125 milliGRAM(s) Oral every 12 hours  ceFAZolin   IVPB 1000 milliGRAM(s) IV Intermittent every 8 hours  dextrose 5%. 1000 milliLiter(s) (50 mL/Hr) IV Continuous <Continuous>  dextrose 5%. 1000 milliLiter(s) (100 mL/Hr) IV Continuous <Continuous>  dextrose 50% Injectable 25 Gram(s) IV Push once  dextrose 50% Injectable 12.5 Gram(s) IV Push once  dextrose 50% Injectable 25 Gram(s) IV Push once  famotidine    Tablet 20 milliGRAM(s) Oral daily  glucagon  Injectable 1 milliGRAM(s) IntraMuscular once  insulin glargine Injectable (LANTUS) 20 Unit(s) SubCutaneous at bedtime  insulin lispro (ADMELOG) corrective regimen sliding scale   SubCutaneous three times a day before meals  insulin lispro (ADMELOG) corrective regimen sliding scale   SubCutaneous at bedtime  insulin lispro Injectable (ADMELOG) 12 Unit(s) SubCutaneous three times a day before meals  mycophenolate mofetil 1000 milliGRAM(s) Oral two times a day  nystatin    Suspension 133828 Unit(s) Oral four times a day  predniSONE   Tablet 20 milliGRAM(s) Oral daily  senna 2 Tablet(s) Oral at bedtime  tacrolimus ER Tablet (ENVARSUS XR) 6 milliGRAM(s) Oral <User Schedule>  trimethoprim   80 mG/sulfamethoxazole 400 mG 1 Tablet(s) Oral daily  valGANciclovir 450 milliGRAM(s) Oral <User Schedule>    MEDICATIONS  (PRN):  dextrose Oral Gel 15 Gram(s) Oral once PRN Blood Glucose LESS THAN 70 milliGRAM(s)/deciliter

## 2023-08-15 NOTE — PROGRESS NOTE ADULT - PROBLEM SELECTOR PLAN 4
Phos is at goal for now. C/w home Sevelamer 800 mg tid. Recommend to continue with phosphorus binder. Low phosphorus diet. Monitor serum phosphorus, goal: 3.5-5.5    If you have any questions, please feel free to contact me  Shar Schaefer  Nephrology Fellow  [phone]; Prefer [employer] TEAMS  (After 5pm or on weekends please page the on-call fellow). Phos is below goal.  Encourage PO intake. goal: 3.5-5.5    If you have any questions, please feel free to contact me  Shar Schaefer  Nephrology Fellow  [phone]; Prefer [employer] TEAMS  (After 5pm or on weekends please page the on-call fellow).

## 2023-08-15 NOTE — DIETITIAN INITIAL EVALUATION ADULT - EDUCATION DIETARY MODIFICATIONS
Briefly reviewed post-organ transplant nutrition therapy  that was  discussed at length during previous admission. Discussed requirement to follow food safety guidelines with medications, increased needs for proper post-surgical healing, and moderate intake of sodium and carbohydrates. Encouraged strategies to optimize nutritional intake due to increased demands associated with surgical healing. Pt expressed understanding./(2) meets goals/outcomes/verbalization

## 2023-08-16 PROBLEM — Z94.0 KIDNEY TRANSPLANT STATUS: Chronic | Status: ACTIVE | Noted: 2023-08-07

## 2023-08-16 LAB
ANION GAP SERPL CALC-SCNC: 11 MMOL/L — SIGNIFICANT CHANGE UP (ref 5–17)
APTT BLD: 25 SEC — SIGNIFICANT CHANGE UP (ref 24.5–35.6)
BASOPHILS # BLD AUTO: 0.01 K/UL — SIGNIFICANT CHANGE UP (ref 0–0.2)
BASOPHILS NFR BLD AUTO: 0.2 % — SIGNIFICANT CHANGE UP (ref 0–2)
BUN SERPL-MCNC: 46 MG/DL — HIGH (ref 7–23)
CA-I BLD-SCNC: 1.3 MMOL/L — SIGNIFICANT CHANGE UP (ref 1.15–1.33)
CALCIUM SERPL-MCNC: 9.2 MG/DL — SIGNIFICANT CHANGE UP (ref 8.4–10.5)
CHLORIDE SERPL-SCNC: 104 MMOL/L — SIGNIFICANT CHANGE UP (ref 96–108)
CO2 SERPL-SCNC: 21 MMOL/L — LOW (ref 22–31)
CREAT SERPL-MCNC: 2.12 MG/DL — HIGH (ref 0.5–1.3)
EGFR: 37 ML/MIN/1.73M2 — LOW
EOSINOPHIL # BLD AUTO: 0 K/UL — SIGNIFICANT CHANGE UP (ref 0–0.5)
EOSINOPHIL NFR BLD AUTO: 0 % — SIGNIFICANT CHANGE UP (ref 0–6)
FERRITIN SERPL-MCNC: 1010 NG/ML — HIGH (ref 30–400)
FIBRINOGEN PPP-MCNC: 209 MG/DL — SIGNIFICANT CHANGE UP (ref 200–445)
GLUCOSE BLDC GLUCOMTR-MCNC: 144 MG/DL — HIGH (ref 70–99)
GLUCOSE BLDC GLUCOMTR-MCNC: 208 MG/DL — HIGH (ref 70–99)
GLUCOSE BLDC GLUCOMTR-MCNC: 216 MG/DL — HIGH (ref 70–99)
GLUCOSE BLDC GLUCOMTR-MCNC: 240 MG/DL — HIGH (ref 70–99)
GLUCOSE SERPL-MCNC: 148 MG/DL — HIGH (ref 70–99)
HCT VFR BLD CALC: 23.9 % — LOW (ref 39–50)
HGB BLD-MCNC: 7.7 G/DL — LOW (ref 13–17)
IMM GRANULOCYTES NFR BLD AUTO: 4.4 % — HIGH (ref 0–0.9)
INR BLD: 0.93 RATIO — SIGNIFICANT CHANGE UP (ref 0.85–1.18)
IRON SATN MFR SERPL: 37 % — SIGNIFICANT CHANGE UP (ref 16–55)
IRON SATN MFR SERPL: 68 UG/DL — SIGNIFICANT CHANGE UP (ref 45–165)
LYMPHOCYTES # BLD AUTO: 0.16 K/UL — LOW (ref 1–3.3)
LYMPHOCYTES # BLD AUTO: 3.9 % — LOW (ref 13–44)
MAGNESIUM SERPL-MCNC: 2 MG/DL — SIGNIFICANT CHANGE UP (ref 1.6–2.6)
MCHC RBC-ENTMCNC: 32 PG — SIGNIFICANT CHANGE UP (ref 27–34)
MCHC RBC-ENTMCNC: 32.2 GM/DL — SIGNIFICANT CHANGE UP (ref 32–36)
MCV RBC AUTO: 99.2 FL — SIGNIFICANT CHANGE UP (ref 80–100)
MONOCYTES # BLD AUTO: 0.43 K/UL — SIGNIFICANT CHANGE UP (ref 0–0.9)
MONOCYTES NFR BLD AUTO: 10.5 % — SIGNIFICANT CHANGE UP (ref 2–14)
NEUTROPHILS # BLD AUTO: 3.3 K/UL — SIGNIFICANT CHANGE UP (ref 1.8–7.4)
NEUTROPHILS NFR BLD AUTO: 81 % — HIGH (ref 43–77)
NRBC # BLD: 0 /100 WBCS — SIGNIFICANT CHANGE UP (ref 0–0)
PHOSPHATE SERPL-MCNC: 2.3 MG/DL — LOW (ref 2.5–4.5)
PLATELET # BLD AUTO: 189 K/UL — SIGNIFICANT CHANGE UP (ref 150–400)
POTASSIUM SERPL-MCNC: 3.9 MMOL/L — SIGNIFICANT CHANGE UP (ref 3.5–5.3)
POTASSIUM SERPL-SCNC: 3.9 MMOL/L — SIGNIFICANT CHANGE UP (ref 3.5–5.3)
PROTHROM AB SERPL-ACNC: 10.3 SEC — SIGNIFICANT CHANGE UP (ref 9.5–13)
RBC # BLD: 2.41 M/UL — LOW (ref 4.2–5.8)
RBC # FLD: 18.1 % — HIGH (ref 10.3–14.5)
SODIUM SERPL-SCNC: 136 MMOL/L — SIGNIFICANT CHANGE UP (ref 135–145)
TACROLIMUS SERPL-MCNC: 8.3 NG/ML — SIGNIFICANT CHANGE UP
TIBC SERPL-MCNC: 185 UG/DL — LOW (ref 220–430)
TRANSFERRIN SERPL-MCNC: 134 MG/DL — LOW (ref 200–360)
UIBC SERPL-MCNC: 117 UG/DL — SIGNIFICANT CHANGE UP (ref 110–370)
WBC # BLD: 4.08 K/UL — SIGNIFICANT CHANGE UP (ref 3.8–10.5)
WBC # FLD AUTO: 4.08 K/UL — SIGNIFICANT CHANGE UP (ref 3.8–10.5)

## 2023-08-16 PROCEDURE — 99232 SBSQ HOSP IP/OBS MODERATE 35: CPT | Mod: GC

## 2023-08-16 PROCEDURE — 36514 APHERESIS PLASMA: CPT

## 2023-08-16 RX ORDER — IMMUNE GLOBULIN (HUMAN) 10 G/100ML
9 INJECTION INTRAVENOUS; SUBCUTANEOUS ONCE
Refills: 0 | Status: COMPLETED | OUTPATIENT
Start: 2023-08-16 | End: 2023-08-16

## 2023-08-16 RX ORDER — DIPHENHYDRAMINE HCL 50 MG
25 CAPSULE ORAL ONCE
Refills: 0 | Status: COMPLETED | OUTPATIENT
Start: 2023-08-16 | End: 2023-08-16

## 2023-08-16 RX ORDER — ACETAMINOPHEN 500 MG
650 TABLET ORAL ONCE
Refills: 0 | Status: COMPLETED | OUTPATIENT
Start: 2023-08-16 | End: 2023-08-16

## 2023-08-16 RX ADMIN — FAMOTIDINE 20 MILLIGRAM(S): 10 INJECTION INTRAVENOUS at 13:07

## 2023-08-16 RX ADMIN — Medication 650 MILLIGRAM(S): at 16:32

## 2023-08-16 RX ADMIN — Medication 4: at 17:29

## 2023-08-16 RX ADMIN — Medication 1 TABLET(S): at 13:06

## 2023-08-16 RX ADMIN — Medication 100 MILLIGRAM(S): at 05:11

## 2023-08-16 RX ADMIN — Medication 10 UNIT(S): at 13:06

## 2023-08-16 RX ADMIN — Medication 10 UNIT(S): at 17:29

## 2023-08-16 RX ADMIN — INSULIN GLARGINE 18 UNIT(S): 100 INJECTION, SOLUTION SUBCUTANEOUS at 21:31

## 2023-08-16 RX ADMIN — MYCOPHENOLATE MOFETIL 1000 MILLIGRAM(S): 250 CAPSULE ORAL at 09:38

## 2023-08-16 RX ADMIN — Medication 4: at 13:05

## 2023-08-16 RX ADMIN — VALGANCICLOVIR 450 MILLIGRAM(S): 450 TABLET, FILM COATED ORAL at 09:38

## 2023-08-16 RX ADMIN — Medication 500000 UNIT(S): at 17:29

## 2023-08-16 RX ADMIN — Medication 81 MILLIGRAM(S): at 13:07

## 2023-08-16 RX ADMIN — Medication 500000 UNIT(S): at 13:06

## 2023-08-16 RX ADMIN — Medication 500000 UNIT(S): at 23:10

## 2023-08-16 RX ADMIN — Medication 20 MILLIGRAM(S): at 05:11

## 2023-08-16 RX ADMIN — TACROLIMUS 6 MILLIGRAM(S): 5 CAPSULE ORAL at 09:38

## 2023-08-16 RX ADMIN — ATORVASTATIN CALCIUM 40 MILLIGRAM(S): 80 TABLET, FILM COATED ORAL at 21:31

## 2023-08-16 RX ADMIN — Medication 500000 UNIT(S): at 05:11

## 2023-08-16 RX ADMIN — Medication 10 UNIT(S): at 09:39

## 2023-08-16 RX ADMIN — Medication 4: at 09:38

## 2023-08-16 RX ADMIN — Medication 650 MILLIGRAM(S): at 16:46

## 2023-08-16 RX ADMIN — Medication 100 MILLIGRAM(S): at 22:57

## 2023-08-16 RX ADMIN — Medication 100 MILLIGRAM(S): at 13:05

## 2023-08-16 RX ADMIN — IMMUNE GLOBULIN (HUMAN) 15 GRAM(S): 10 INJECTION INTRAVENOUS; SUBCUTANEOUS at 17:28

## 2023-08-16 RX ADMIN — CARVEDILOL PHOSPHATE 3.12 MILLIGRAM(S): 80 CAPSULE, EXTENDED RELEASE ORAL at 21:30

## 2023-08-16 RX ADMIN — CARVEDILOL PHOSPHATE 3.12 MILLIGRAM(S): 80 CAPSULE, EXTENDED RELEASE ORAL at 05:11

## 2023-08-16 RX ADMIN — Medication 25 MILLIGRAM(S): at 16:32

## 2023-08-16 RX ADMIN — MYCOPHENOLATE MOFETIL 1000 MILLIGRAM(S): 250 CAPSULE ORAL at 20:24

## 2023-08-16 NOTE — PROGRESS NOTE ADULT - SUBJECTIVE AND OBJECTIVE BOX
Patient is a 53y old  Male who presents with a chief complaint of UTI, fecal impaction (16 Aug 2023 10:30)      DATE OF SERVICE: 08-16-23 @ 13:28    SUBJECTIVE / OVERNIGHT EVENTS: overnight events noted    ROS:  Resp: No cough no sputum production  CVS: No chest pain no palpitations no orthopnea  GI: no N/V/D          MEDICATIONS  (STANDING):  acetaminophen     Tablet .. 650 milliGRAM(s) Oral once  aspirin enteric coated 81 milliGRAM(s) Oral daily  atorvastatin 40 milliGRAM(s) Oral at bedtime  bisacodyl 5 milliGRAM(s) Oral at bedtime  carvedilol 3.125 milliGRAM(s) Oral every 12 hours  ceFAZolin   IVPB 1000 milliGRAM(s) IV Intermittent every 8 hours  dextrose 5%. 1000 milliLiter(s) (50 mL/Hr) IV Continuous <Continuous>  dextrose 5%. 1000 milliLiter(s) (100 mL/Hr) IV Continuous <Continuous>  dextrose 50% Injectable 25 Gram(s) IV Push once  dextrose 50% Injectable 12.5 Gram(s) IV Push once  dextrose 50% Injectable 25 Gram(s) IV Push once  diphenhydrAMINE 25 milliGRAM(s) Oral once  famotidine    Tablet 20 milliGRAM(s) Oral daily  glucagon  Injectable 1 milliGRAM(s) IntraMuscular once  immune   globulin 10% (GAMMAGARD) IVPB 9 Gram(s) IV Intermittent once  insulin glargine Injectable (LANTUS) 18 Unit(s) SubCutaneous at bedtime  insulin lispro (ADMELOG) corrective regimen sliding scale   SubCutaneous three times a day before meals  insulin lispro (ADMELOG) corrective regimen sliding scale   SubCutaneous at bedtime  insulin lispro Injectable (ADMELOG) 10 Unit(s) SubCutaneous three times a day before meals  mycophenolate mofetil 1000 milliGRAM(s) Oral two times a day  nystatin    Suspension 224606 Unit(s) Oral four times a day  predniSONE   Tablet 20 milliGRAM(s) Oral daily  senna 2 Tablet(s) Oral at bedtime  tacrolimus ER Tablet (ENVARSUS XR) 6 milliGRAM(s) Oral <User Schedule>  trimethoprim   80 mG/sulfamethoxazole 400 mG 1 Tablet(s) Oral daily  valGANciclovir 450 milliGRAM(s) Oral <User Schedule>    MEDICATIONS  (PRN):  dextrose Oral Gel 15 Gram(s) Oral once PRN Blood Glucose LESS THAN 70 milliGRAM(s)/deciliter        CAPILLARY BLOOD GLUCOSE      POCT Blood Glucose.: 208 mg/dL (16 Aug 2023 13:00)  POCT Blood Glucose.: 216 mg/dL (16 Aug 2023 09:02)  POCT Blood Glucose.: 150 mg/dL (15 Aug 2023 22:24)  POCT Blood Glucose.: 131 mg/dL (15 Aug 2023 21:19)  POCT Blood Glucose.: 128 mg/dL (15 Aug 2023 17:16)  POCT Blood Glucose.: 205 mg/dL (15 Aug 2023 13:32)    I&O's Summary    15 Aug 2023 07:01  -  16 Aug 2023 07:00  --------------------------------------------------------  IN: 360 mL / OUT: 825 mL / NET: -465 mL    16 Aug 2023 07:01  -  16 Aug 2023 13:28  --------------------------------------------------------  IN: 240 mL / OUT: 110 mL / NET: 130 mL        Vital Signs Last 24 Hrs  T(C): 36.4 (16 Aug 2023 09:29), Max: 37 (16 Aug 2023 05:05)  T(F): 97.6 (16 Aug 2023 09:29), Max: 98.6 (16 Aug 2023 05:05)  HR: 87 (16 Aug 2023 09:29) (87 - 90)  BP: 130/71 (16 Aug 2023 09:29) (111/67 - 131/66)  BP(mean): --  RR: 20 (16 Aug 2023 09:29) (18 - 20)  SpO2: 100% (16 Aug 2023 09:29) (98% - 100%)    PHYSICAL EXAM:  EYES: EOMI, PERRLA  CHEST/LUNG: clear   HEART: S1 S2; no murmurs   ABDOMEN: Soft, Nontender  EXTREMITIES:  no edema  NEUROLOGY: AO x 3 non-focal  SKIN: No rashes or lesions    LABS:                        7.7    4.08  )-----------( 189      ( 16 Aug 2023 06:37 )             23.9     08-16    136  |  104  |  46<H>  ----------------------------<  148<H>  3.9   |  21<L>  |  2.12<H>    Ca    9.2      16 Aug 2023 06:38  Phos  2.3     08-16  Mg     2.0     08-16      PT/INR - ( 16 Aug 2023 06:38 )   PT: 10.3 sec;   INR: 0.93 ratio         PTT - ( 16 Aug 2023 06:38 )  PTT:25.0 sec      Urinalysis Basic - ( 16 Aug 2023 06:38 )    Color: x / Appearance: x / SG: x / pH: x  Gluc: 148 mg/dL / Ketone: x  / Bili: x / Urobili: x   Blood: x / Protein: x / Nitrite: x   Leuk Esterase: x / RBC: x / WBC x   Sq Epi: x / Non Sq Epi: x / Bacteria: x          All consultant(s) notes reviewed and care discussed with other providers        Contact Number, Dr Murphy [phone]

## 2023-08-16 NOTE — PROGRESS NOTE ADULT - PROBLEM SELECTOR PLAN 1
Pt. with h/o ESRD on HD. Underwent DDRT on 7/16/23. Post-transplant course significant for DGF. Still receiving iHD at [facility], last outpatient HD was 8/4/23. HD consent obtained from patient and placed in chart. Last HD done Mon 8/7. Renal biopsy done, showing AMR and diabetic nephrosclerosis (g1, ptc0-1 c4d 0).     Pt. is non-oliguric. Pt. clinically stable. Labs reviewed. Continue with immunosuppression regimen, as outlined below. Monitor labs and urine output. Avoid nephrotoxins. Dose medications as per eGFR. Plan for PLEX on 8/11, 8/12, and 8/14. DSA pending. Will plan for Rituximab on Friday    On antibiotics for klebsiella UTI, sensitive to ceftriaxone (8/7-8/10); then on Cefazolin until today.

## 2023-08-16 NOTE — PROGRESS NOTE ADULT - ASSESSMENT
Tee Salvador is a 52 y/o M with past medical history significant for HTN, CHF (s/p AICD 2016--last interrogated 7/15/23), CAD (s/p CABG 2015), IDDM, PVD s/p stent x4 in RLE with R big toe/second toe amputation (2021) and ESRD on HD via LUE AVF for 6 years now s/p DDRT 7/16/2023 with Thymoglobulin induction. Post-transplant course complicated by severe constipation requiring disimpaction, DGF requiring hemodialysis and LUE swelling with concern for L subclavian thrombus, now s/p IR fistulogram 7/7/23 with balloon angioplasty of subclavian vein. He was discharged home on 7/27/23 and remained on HD MWF at home center [facility]. He presents from home via EMS to [facility] on 8/7/23 with constipation and continued DGF    [ ] DGF s/p DDRT 7/16/23  -S/P IR biopsy on 8/10 with concern for AMR. will continue PLEX/IVIG (plan for 5 sessions total, PLEX/IVIG#4 tomorrow 8/16), plan for Rituximab Friday 8/18  -DSA+ : will continue pulse dose steroids with taper (500 on 8/8, 250 on 8/9, 125 on 8/10, 60 on 8/12).  Prednisone 20 mg daily  -graft function improving  -last HD on 8/7, furosemide on Hold.   -UTI: Klebsiella, CTX-->Ancef (total of 7D per ID), last day on 8/16, follow up repeat Ucx: Neg.   -BCx negative  -LIANA to continue, LIANA Cr negative  -bowel regimen  -diet as tolerated  -SCDs  -last epogen 8/11, stable h/h. continue ASA    Immunosuppression  -Envarsus per level  -MMF 1/1  -steroids as above  -PPx regimen: Valcyte/Bactrim/Nystatin/PPI with renal dosing    DM  -Lantus/Lispro adjust while on steroids.     # [id number]  Transplant PA     Tee Salvador is a 52 y/o M with past medical history significant for HTN, CHF (s/p AICD 2016--last interrogated 7/15/23), CAD (s/p CABG 2015), IDDM, PVD s/p stent x4 in RLE with R big toe/second toe amputation (2021) and ESRD on HD via LUE AVF for 6 years now s/p DDRT 7/16/2023 with Thymoglobulin induction. Post-transplant course complicated by severe constipation requiring disimpaction, DGF requiring hemodialysis and LUE swelling with concern for L subclavian thrombus, now s/p IR fistulogram 7/7/23 with balloon angioplasty of subclavian vein. He was discharged home on 7/27/23 and remained on HD MWF at home center [facility]. He presents from home via EMS to [facility] on 8/7/23 with constipation and continued DGF    [ ] DGF s/p DDRT 7/16/23  -S/P IR biopsy on 8/10 with concern for AMR. will continue PLEX/IVIG (plan for 5 sessions total, PLEX/IVIG#4 tomorrow 8/16), plan for Rituximab Friday 8/18  -DSA+ : will continue pulse dose steroids with taper (500 on 8/8, 250 on 8/9, 125 on 8/10, 60 on 8/12).  Prednisone 20 mg daily  - DSA labs sent today 8/16.   -graft function improving  -last HD on 8/7, furosemide on Hold.   -UTI: Klebsiella, CTX-->Ancef (total of 7D per ID), last day on 8/16, follow up repeat Ucx: Neg.   -BCx negative  -LIANA to continue, LIANA Cr negative  -bowel regimen  -diet as tolerated  -SCDs  -last epogen 8/11, stable h/h. continue ASA    [ ] Immunosuppression  -Envarsus per level  -MMF 1/1  -steroids as above  -PPx regimen: Valcyte/Bactrim/Nystatin/PPI with renal dosing    [ ] DM  -Lantus/Lispro adjust while on steroids.     # [id number]  Transplant PA

## 2023-08-16 NOTE — PROGRESS NOTE ADULT - ASSESSMENT
52 y/o M with past medical history significant for HTN, CHF (s/p AICD 2016), CAD (s/p CABG 2015), IDDM, PVD s/p stent x4 in RLE with R big toe/second toe amputation (2021) and ESRD on HD via LUE AVF for 6 years now s/p DDRT 7/16/2023 with Thymoglobulin induction.   Post-transplant course complicated by severe constipation requiring disimpaction, DGF requiring hemodialysis and LUE swelling with concern for L subclavian thrombus, now s/p IR fistulogram 7/7/23 with balloon angioplasty of subclavian vein.   discharged home on 7/27/23 and remained on HD MWF   He presents from home via EMS to [facility] on 8/7/23 with abdominal discomfort and no bowel movement for 3 days.     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

## 2023-08-16 NOTE — PROGRESS NOTE ADULT - ASSESSMENT
54 yo M with hx of DM2 CAD, CHF, s/p CABG 2015, AICD (2016), on hemodialysis for approximately 6 years via L AVF (nephrologist Dr. Bharath Romo), He has hx of PVD, is s/p 4 stents in R leg (mid and distal right superficial femoral artery, right popliteal x2 on 1/14/22).; is sp RT big toe and second toe amputation 2021, on asa and plavix for severe PVD (per pt). Now here for possible DDRT. Pt reports does not make urine only few drops occasionally. Pt denies N/V/D, fevers/chills, CP, SOB. Pt reports last ate at 4pm today and had HD yesterday 7/14/23.  Nephrology consulted for ESRD s/p DDRT.      s/p DDRT @ [facility] 7/16/2023   Initially received Simulect induction -- Changed to Thymoglobulin given delayed graft function.  Pt discharged on previous admission on HD.   Post transport course significant for DGF  Via L AVF --> s/p Left Radiocephalic fistulogram and subclavian vein angioplasty with Vascular 7/24/2023  Consent obtained from patient and placed in chart.  s/p CT A& P No contrast 8/7/23 --> partially imaged L pleural effusion, unchanged. Partially  imaged LLL opacity again seen.  small amount of dependent air within urinary bladder, small renal cysts, left transplant kidney with mil hydro. Stent extends from kidney to bladder.   s/p Doppler Renal US Transplant kidney 8/7/23 --> patent renal vasculature. Elevated renal artery peak systolic velocities. Concern for possible renal artery stenosis at anastomosis. Mild elevation  of intrarenal resistive indices , grossly unchanged from prior exam.   Continue Immunosuppression per transplant : Envarsus per level ( 6mg) , Cellcept 1 gm bid, and pulse steroids, prednisone 20 mg qd  Tacro trough is 8.3 on 8/16/23    s/p Transplant renal biopsy 8/09 --Renal biopsy  showing AMR and diabetic nephrosclerosis (g1, ptc0-1 c4d 0). Plan for total 5 sessions of PLEX, s/p PLEX on  8/11, 8/12. #4 tomorrow 8/17, plan for Rituximab Friday 8/18. DSA labs sent 8/16  Monitor labs and urine output. Avoid nephrotoxins  HD per transplant, last HD 8/7 . Pt off Furosemide.   Dose medications as per eGFR.  Renal Diet   Monitor     Anemia  Mgmt per Transplant   LUX  Maintain Hgb > 8  Monitor     HTN:   BP fluctuates   Monitor     Proteinuria / Hematuria   likely in setting of + LE, bacteria  Urine cx + Klebsiella   On Cefazolin  Mgmt per Transplant ID

## 2023-08-16 NOTE — PROGRESS NOTE ADULT - SUBJECTIVE AND OBJECTIVE BOX
Subjective: Patient seen and examined. No new events except as noted.     SUBJECTIVE/ROS:        MEDICATIONS:  MEDICATIONS  (STANDING):  aspirin enteric coated 81 milliGRAM(s) Oral daily  atorvastatin 40 milliGRAM(s) Oral at bedtime  bisacodyl 5 milliGRAM(s) Oral at bedtime  carvedilol 3.125 milliGRAM(s) Oral every 12 hours  ceFAZolin   IVPB 1000 milliGRAM(s) IV Intermittent every 8 hours  dextrose 5%. 1000 milliLiter(s) (50 mL/Hr) IV Continuous <Continuous>  dextrose 5%. 1000 milliLiter(s) (100 mL/Hr) IV Continuous <Continuous>  dextrose 50% Injectable 25 Gram(s) IV Push once  dextrose 50% Injectable 12.5 Gram(s) IV Push once  dextrose 50% Injectable 25 Gram(s) IV Push once  famotidine    Tablet 20 milliGRAM(s) Oral daily  glucagon  Injectable 1 milliGRAM(s) IntraMuscular once  insulin glargine Injectable (LANTUS) 18 Unit(s) SubCutaneous at bedtime  insulin lispro (ADMELOG) corrective regimen sliding scale   SubCutaneous three times a day before meals  insulin lispro (ADMELOG) corrective regimen sliding scale   SubCutaneous at bedtime  insulin lispro Injectable (ADMELOG) 10 Unit(s) SubCutaneous three times a day before meals  mycophenolate mofetil 1000 milliGRAM(s) Oral two times a day  nystatin    Suspension 029074 Unit(s) Oral four times a day  predniSONE   Tablet 20 milliGRAM(s) Oral daily  senna 2 Tablet(s) Oral at bedtime  tacrolimus ER Tablet (ENVARSUS XR) 6 milliGRAM(s) Oral <User Schedule>  trimethoprim   80 mG/sulfamethoxazole 400 mG 1 Tablet(s) Oral daily  valGANciclovir 450 milliGRAM(s) Oral <User Schedule>      PHYSICAL EXAM:  T(C): 37 (08-16-23 @ 05:05), Max: 37 (08-16-23 @ 05:05)  HR: 90 (08-16-23 @ 05:05) (81 - 90)  BP: 120/69 (08-16-23 @ 05:05) (111/67 - 142/70)  RR: 18 (08-16-23 @ 05:05) (18 - 18)  SpO2: 99% (08-16-23 @ 05:05) (98% - 100%)  Wt(kg): --  I&O's Summary    15 Aug 2023 07:01  -  16 Aug 2023 07:00  --------------------------------------------------------  IN: 360 mL / OUT: 825 mL / NET: -465 mL            JVP: Normal  Neck: supple  Lung: clear   CV: S1 S2 , Murmur:  Abd: soft  Ext: No edema  neuro: Awake / alert  Psych: flat affect  Skin: normal``    LABS/DATA:    CARDIAC MARKERS:                                7.7    4.08  )-----------( 189      ( 16 Aug 2023 06:37 )             23.9     08-16    136  |  104  |  46<H>  ----------------------------<  148<H>  3.9   |  21<L>  |  2.12<H>    Ca    9.2      16 Aug 2023 06:38  Phos  2.3     08-16  Mg     2.0     08-16      proBNP:   Lipid Profile:   HgA1c:   TSH:     TELE:  EKG:

## 2023-08-16 NOTE — CHART NOTE - NSCHARTNOTEFT_GEN_A_CORE
52 y/o M with past medical history significant for HTN, CHF (s/p AICD 2016--last interrogated 7/15/23), CAD (s/p CABG 2015), IDDM, PVD s/p stent x4 in RLE with R big toe/second toe amputation (2021) and ESRD on HD via LUE AVF for 6 years now s/p DDRT 7/16/2023. Kidney biopsy on 8/10 concerned for AMR and diabetic nephrosclerosis. BB is contacted to initiate PLEX. A course of 3 PLEX was originally planned (8/11,8/12,8/14). 2 additional sessions were requested.    8/11/23 PLEX #1, 1 plasma volume with half donor plasma and half 5% albumin as replacement fluid. PLEX#1 completed with no issues.  8/12/23 PLEX #2, 1 plasma volume with half donor plasma and half 5% albumin as replacement fluid. PLEX#2 completed with no issues.  8/14/23 PLEX #3  1 plasma volume with half donor plasma and half 5% albumin as replacement fluid. Pt BP dropped to 87s during the procedure but pt asymptomatic. 100ml saline bolus given. BP went up to 110s. Procedure resumed and completed with no other issues.    8/16/23 PLEX #4, 1 plasma volume with half donor plasma and half 5% albumin as replacement fluid. PLEX#4 completed with no issues.    PLEX #5 is scheduled for 08/18/23. Please order CBC, fibrinogen, coags, and ionized calcium with AM labs.

## 2023-08-16 NOTE — PROGRESS NOTE ADULT - SUBJECTIVE AND OBJECTIVE BOX
[facility]   MD DANIEL WALDEN MD BRIANA PETITO, NP    TEL:  FROM 9 AM to 5 PM ---OFFICE: [phone]  FROM 5 PM - 9 AM PLEASE CALL ANSWERING SERVICE: [phone]     RENAL PROGRESS NOTE: DATE OF SERVICE 08-16-23 @ 17:42  Patient is a 53y old  Male who presents with a chief complaint of UTI, fecal impaction   Patient seen and examined at bedside. No chest pain/sob    VITALS:  T(F): 98.4 (08-16-23 @ 17:25), Max: 98.6 (08-16-23 @ 05:05)  HR: 88 (08-16-23 @ 17:25)  BP: 101/52 (08-16-23 @ 17:25)  RR: 18 (08-16-23 @ 17:25)  SpO2: 100% (08-16-23 @ 17:25)  Wt(kg): --    08-15 @ 07:01  -  08-16 @ 07:00  --------------------------------------------------------  IN: 360 mL / OUT: 825 mL / NET: -465 mL    08-16 @ 07:01  -  08-16 @ 17:42  --------------------------------------------------------  IN: 240 mL / OUT: 110 mL / NET: 130 mL      PHYSICAL EXAM:  Constitutional: NAD  Neck: No JVD  Respiratory: CTAB, no wheezes, rales or rhonchi  Cardiovascular: S1, S2, RRR  Gastrointestinal: BS+, soft, NT/ND  Extremities: No peripheral edema  Access: AVF -- palpable thrill, bruit auscultated     Hospital Medications:   MEDICATIONS  (STANDING):  aspirin enteric coated 81 milliGRAM(s) Oral daily  atorvastatin 40 milliGRAM(s) Oral at bedtime  bisacodyl 5 milliGRAM(s) Oral at bedtime  carvedilol 3.125 milliGRAM(s) Oral every 12 hours  ceFAZolin   IVPB 1000 milliGRAM(s) IV Intermittent every 8 hours  dextrose 5%. 1000 milliLiter(s) (50 mL/Hr) IV Continuous <Continuous>  dextrose 5%. 1000 milliLiter(s) (100 mL/Hr) IV Continuous <Continuous>  dextrose 50% Injectable 25 Gram(s) IV Push once  dextrose 50% Injectable 12.5 Gram(s) IV Push once  dextrose 50% Injectable 25 Gram(s) IV Push once  famotidine    Tablet 20 milliGRAM(s) Oral daily  glucagon  Injectable 1 milliGRAM(s) IntraMuscular once  insulin glargine Injectable (LANTUS) 18 Unit(s) SubCutaneous at bedtime  insulin lispro (ADMELOG) corrective regimen sliding scale   SubCutaneous three times a day before meals  insulin lispro (ADMELOG) corrective regimen sliding scale   SubCutaneous at bedtime  insulin lispro Injectable (ADMELOG) 10 Unit(s) SubCutaneous three times a day before meals  mycophenolate mofetil 1000 milliGRAM(s) Oral two times a day  nystatin    Suspension 699920 Unit(s) Oral four times a day  predniSONE   Tablet 20 milliGRAM(s) Oral daily  senna 2 Tablet(s) Oral at bedtime  tacrolimus ER Tablet (ENVARSUS XR) 6 milliGRAM(s) Oral <User Schedule>  trimethoprim   80 mG/sulfamethoxazole 400 mG 1 Tablet(s) Oral daily  valGANciclovir 450 milliGRAM(s) Oral <User Schedule>    Tacrolimus (), Serum: 8.3 ng/mL (08-16 @ 06:37)    LABS:  08-16    136  |  104  |  46<H>  ----------------------------<  148<H>  3.9   |  21<L>  |  2.12<H>    Ca    9.2      16 Aug 2023 06:38  Phos  2.3     08-16  Mg     2.0     08-16      Creatinine Trend: 2.12 <--, 2.09 <--, 2.30 <--, 2.35 <--, 2.54 <--, 3.35 <--, 3.72 <--    Ferritin: 1010 ng/mL (08-16 @ 06:39)  Iron - Total Binding Capacity.: 185 ug/dL (08-16 @ 06:39)  Iron Total: 68 ug/dL (08-16 @ 06:39)  Phosphorus: 2.3 mg/dL (08-16 @ 06:38)                              7.7    4.08  )-----------( 189      ( 16 Aug 2023 06:37 )             23.9     Urine Studies:  Urinalysis - [08-16-23 @ 06:38]      Color  / Appearance  / SG  / pH       Gluc 148 / Ketone   / Bili  / Urobili        Blood  / Protein  / Leuk Est  / Nitrite       RBC  / WBC  / Hyaline  / Gran  / Sq Epi  / Non Sq Epi  / Bacteria       Iron 68, TIBC 185, %sat 37      [08-16-23 @ 06:39]  Ferritin 1010      [08-16-23 @ 06:39]        RADIOLOGY & ADDITIONAL STUDIES:

## 2023-08-16 NOTE — PROGRESS NOTE ADULT - SUBJECTIVE AND OBJECTIVE BOX
Transplant Surgery - Multidisciplinary Progress Note  --------------------------------------------------------------  DDRT 7/16/2023    Present:    Patient seen and examined with multidisciplinary team including Transplant Surgeon: Dr. Delacruz. Transplant Nephrologist: Dr. JONO Corado Transplant Pharmacist: SAEID Plunkett/SHERIE Chris and unit RN during am rounds.  Disciplines not in attendance will be notified of the plan.     HPI: Tee Salvador is a 54 y/o M with past medical history significant for HTN, CHF (s/p AICD 2016--last interrogated 7/15/23), CAD (s/p CABG 2015), IDDM, PVD s/p stent x4 in RLE with R big toe/second toe amputation (2021) and ESRD on HD via LUE AVF for 6 years now s/p DDRT 7/16/2023 with Thymoglobulin induction. Post-transplant course complicated by severe constipation requiring disimpaction, DGF requiring hemodialysis and LUE swelling with concern for L subclavian thrombus, now s/p IR fistulogram 7/7/23 with balloon angioplasty of subclavian vein. He was discharged home on 7/27/23 and remained on HD MWF at home center [facility]. He presents from home via EMS to [facility] on 8/7/23 with constipation and continued DGF.     Interval Events:  - bx with concern for AMR, tolerating PLEX/IVIG/steroids.   - no complaints at this time  - Denies any Abdominal Pain.  - Have BM, passing gas  - Cr:  2.12 UOP: 750cc.   - LIANA x1 still in place from OR,   -  VSS, Afebrile. - Pt denies CP/SOB, fever/chills, N/V.     Immunosuppression:  Envarsus per level  MMF 1/1  pulse steroids with taper    Potential Discharge date: TBD    Education:  Medications    Plan of care:  See Below      MEDICATIONS  (STANDING):  acetaminophen     Tablet .. 650 milliGRAM(s) Oral once  aspirin enteric coated 81 milliGRAM(s) Oral daily  atorvastatin 40 milliGRAM(s) Oral at bedtime  bisacodyl 5 milliGRAM(s) Oral at bedtime  carvedilol 3.125 milliGRAM(s) Oral every 12 hours  ceFAZolin   IVPB 1000 milliGRAM(s) IV Intermittent every 8 hours  dextrose 5%. 1000 milliLiter(s) (100 mL/Hr) IV Continuous <Continuous>  dextrose 5%. 1000 milliLiter(s) (50 mL/Hr) IV Continuous <Continuous>  dextrose 50% Injectable 25 Gram(s) IV Push once  dextrose 50% Injectable 12.5 Gram(s) IV Push once  dextrose 50% Injectable 25 Gram(s) IV Push once  diphenhydrAMINE 25 milliGRAM(s) Oral once  famotidine    Tablet 20 milliGRAM(s) Oral daily  glucagon  Injectable 1 milliGRAM(s) IntraMuscular once  immune   globulin 10% (GAMMAGARD) IVPB 9 Gram(s) IV Intermittent once  insulin glargine Injectable (LANTUS) 18 Unit(s) SubCutaneous at bedtime  insulin lispro (ADMELOG) corrective regimen sliding scale   SubCutaneous three times a day before meals  insulin lispro (ADMELOG) corrective regimen sliding scale   SubCutaneous at bedtime  insulin lispro Injectable (ADMELOG) 10 Unit(s) SubCutaneous three times a day before meals  mycophenolate mofetil 1000 milliGRAM(s) Oral two times a day  nystatin    Suspension 128472 Unit(s) Oral four times a day  predniSONE   Tablet 20 milliGRAM(s) Oral daily  senna 2 Tablet(s) Oral at bedtime  tacrolimus ER Tablet (ENVARSUS XR) 6 milliGRAM(s) Oral <User Schedule>  trimethoprim   80 mG/sulfamethoxazole 400 mG 1 Tablet(s) Oral daily  valGANciclovir 450 milliGRAM(s) Oral <User Schedule>    MEDICATIONS  (PRN):  dextrose Oral Gel 15 Gram(s) Oral once PRN Blood Glucose LESS THAN 70 milliGRAM(s)/deciliter      PAST MEDICAL & SURGICAL HISTORY:  Diabetes mellitus  type 2      Foot ulcer due to secondary DM      Coronary artery disease      Acute on chronic systolic heart failure      H/O kidney transplant      Breast Reduction  at age 18      Toe amputation status, left      S/P CABG (coronary artery bypass graft)      AICD (automatic cardioverter/defibrillator) present          Vital Signs Last 24 Hrs  T(C): 36.4 (16 Aug 2023 09:29), Max: 37 (16 Aug 2023 05:05)  T(F): 97.6 (16 Aug 2023 09:29), Max: 98.6 (16 Aug 2023 05:05)  HR: 87 (16 Aug 2023 09:29) (87 - 90)  BP: 130/71 (16 Aug 2023 09:29) (111/67 - 141/70)  BP(mean): --  RR: 20 (16 Aug 2023 09:29) (18 - 20)  SpO2: 100% (16 Aug 2023 09:29) (98% - 100%)    Parameters below as of 16 Aug 2023 09:29  Patient On (Oxygen Delivery Method): room air        I&O's Summary    15 Aug 2023 07:01  -  16 Aug 2023 07:00  --------------------------------------------------------  IN: 360 mL / OUT: 825 mL / NET: -465 mL    16 Aug 2023 07:01  -  16 Aug 2023 11:54  --------------------------------------------------------  IN: 240 mL / OUT: 110 mL / NET: 130 mL                              7.7    4.08  )-----------( 189      ( 16 Aug 2023 06:37 )             23.9     08-16    136  |  104  |  46<H>  ----------------------------<  148<H>  3.9   |  21<L>  |  2.12<H>    Ca    9.2      16 Aug 2023 06:38  Phos  2.3     08-16  Mg     2.0     08-16      Tacrolimus (), Serum: 8.3 ng/mL (08-16 @ 06:37)        Culture - Urine (collected 08-14-23 @ 11:24)  Source: Clean Catch Clean Catch (Midstream)  Final Report (08-15-23 @ 13:09):    <10,000 CFU/mL Normal Urogenital Alisia    Review of systems  Gen: No weight changes, fatigue, fevers/chills, weakness  Skin: No rashes  Head/Eyes/Ears/Mouth: No headache; Normal hearing; Normal vision w/o blurriness; No sinus pain/discomfort, sore throat  Respiratory: No dyspnea, cough, wheezing, hemoptysis  CV: No chest pain, PND, orthopnea  GI: no abdominal pain, denies diarrhea, constipation, nausea, vomiting, melena, hematochezia  : No increased frequency, dysuria, hematuria, nocturia  MSK: No joint pain/swelling; no back pain; no edema  Neuro: No dizziness/lightheadedness, weakness, seizures, numbness, tingling  Heme: No easy bruising or bleeding  Endo: No heat/cold intolerance  Psych: No significant nervousness, anxiety, stress, depression  All other systems were reviewed and are negative, except as noted.     PHYSICAL EXAM:  Constitutional: Well developed / well nourished  Eyes: Anicteric, PERRLA  ENMT: nc/at  Neck: supple  Respiratory: CTA B/L  Cardiovascular: RRR  Gastrointestinal: Soft, ND/NT. RLQ incision with some staples still in place . LIANA serous  Genitourinary: Voiding spontaneously  Extremities: SCD's in place and working bilaterally  Vascular: Palpable dp pulses bilaterally  Neurological: A&O x3  Skin: no rashes, ulcerations or lesions;  Musculoskeletal: Moving all extremities  Psychiatric: Responsive  Transplant Surgery - Multidisciplinary Progress Note  --------------------------------------------------------------  DDRT 7/16/2023    Present:    Patient seen and examined with multidisciplinary team including Transplant Surgeon: Dr. Delacruz. Transplant Nephrologist: Dr. JONO Corado Transplant Pharmacist: SAEID Plunkett/SHERIE Chris and unit RN during am rounds.  Disciplines not in attendance will be notified of the plan.     HPI: Tee Salvador is a 52 y/o M with past medical history significant for HTN, CHF (s/p AICD 2016--last interrogated 7/15/23), CAD (s/p CABG 2015), IDDM, PVD s/p stent x4 in RLE with R big toe/second toe amputation (2021) and ESRD on HD via LUE AVF for 6 years now s/p DDRT 7/16/2023 with Thymoglobulin induction. Post-transplant course complicated by severe constipation requiring disimpaction, DGF requiring hemodialysis and LUE swelling with concern for L subclavian thrombus, now s/p IR fistulogram 7/7/23 with balloon angioplasty of subclavian vein. He was discharged home on 7/27/23 and remained on HD MWF at home center [facility]. He presents from home via EMS to [facility] on 8/7/23 with constipation and continued DGF.     Interval Events:  - bx with concern for AMR, tolerating PLEX/IVIG/steroids.   - no complaints at this time  - Denies any Abdominal Pain.  - Have BM, passing gas  - Cr:  2.12 UOP: 750cc.   - LIANA x1 still in place from OR,   -  VSS, Afebrile.   - Pt denies CP/SOB, fever/chills, N/V.     Immunosuppression:  Envarsus per level  MMF 1/1  pulse steroids with taper    Potential Discharge date: TBD    Education:  Medications    Plan of care:  See Below      MEDICATIONS  (STANDING):  acetaminophen     Tablet .. 650 milliGRAM(s) Oral once  aspirin enteric coated 81 milliGRAM(s) Oral daily  atorvastatin 40 milliGRAM(s) Oral at bedtime  bisacodyl 5 milliGRAM(s) Oral at bedtime  carvedilol 3.125 milliGRAM(s) Oral every 12 hours  ceFAZolin   IVPB 1000 milliGRAM(s) IV Intermittent every 8 hours  dextrose 5%. 1000 milliLiter(s) (100 mL/Hr) IV Continuous <Continuous>  dextrose 5%. 1000 milliLiter(s) (50 mL/Hr) IV Continuous <Continuous>  dextrose 50% Injectable 25 Gram(s) IV Push once  dextrose 50% Injectable 12.5 Gram(s) IV Push once  dextrose 50% Injectable 25 Gram(s) IV Push once  diphenhydrAMINE 25 milliGRAM(s) Oral once  famotidine    Tablet 20 milliGRAM(s) Oral daily  glucagon  Injectable 1 milliGRAM(s) IntraMuscular once  immune   globulin 10% (GAMMAGARD) IVPB 9 Gram(s) IV Intermittent once  insulin glargine Injectable (LANTUS) 18 Unit(s) SubCutaneous at bedtime  insulin lispro (ADMELOG) corrective regimen sliding scale   SubCutaneous three times a day before meals  insulin lispro (ADMELOG) corrective regimen sliding scale   SubCutaneous at bedtime  insulin lispro Injectable (ADMELOG) 10 Unit(s) SubCutaneous three times a day before meals  mycophenolate mofetil 1000 milliGRAM(s) Oral two times a day  nystatin    Suspension 772091 Unit(s) Oral four times a day  predniSONE   Tablet 20 milliGRAM(s) Oral daily  senna 2 Tablet(s) Oral at bedtime  tacrolimus ER Tablet (ENVARSUS XR) 6 milliGRAM(s) Oral <User Schedule>  trimethoprim   80 mG/sulfamethoxazole 400 mG 1 Tablet(s) Oral daily  valGANciclovir 450 milliGRAM(s) Oral <User Schedule>    MEDICATIONS  (PRN):  dextrose Oral Gel 15 Gram(s) Oral once PRN Blood Glucose LESS THAN 70 milliGRAM(s)/deciliter      PAST MEDICAL & SURGICAL HISTORY:  Diabetes mellitus  type 2      Foot ulcer due to secondary DM      Coronary artery disease      Acute on chronic systolic heart failure      H/O kidney transplant      Breast Reduction  at age 18      Toe amputation status, left      S/P CABG (coronary artery bypass graft)      AICD (automatic cardioverter/defibrillator) present          Vital Signs Last 24 Hrs  T(C): 36.4 (16 Aug 2023 09:29), Max: 37 (16 Aug 2023 05:05)  T(F): 97.6 (16 Aug 2023 09:29), Max: 98.6 (16 Aug 2023 05:05)  HR: 87 (16 Aug 2023 09:29) (87 - 90)  BP: 130/71 (16 Aug 2023 09:29) (111/67 - 141/70)  BP(mean): --  RR: 20 (16 Aug 2023 09:29) (18 - 20)  SpO2: 100% (16 Aug 2023 09:29) (98% - 100%)    Parameters below as of 16 Aug 2023 09:29  Patient On (Oxygen Delivery Method): room air        I&O's Summary    15 Aug 2023 07:01  -  16 Aug 2023 07:00  --------------------------------------------------------  IN: 360 mL / OUT: 825 mL / NET: -465 mL    16 Aug 2023 07:01  -  16 Aug 2023 11:54  --------------------------------------------------------  IN: 240 mL / OUT: 110 mL / NET: 130 mL                              7.7    4.08  )-----------( 189      ( 16 Aug 2023 06:37 )             23.9     08-16    136  |  104  |  46<H>  ----------------------------<  148<H>  3.9   |  21<L>  |  2.12<H>    Ca    9.2      16 Aug 2023 06:38  Phos  2.3     08-16  Mg     2.0     08-16      Tacrolimus (), Serum: 8.3 ng/mL (08-16 @ 06:37)        Culture - Urine (collected 08-14-23 @ 11:24)  Source: Clean Catch Clean Catch (Midstream)  Final Report (08-15-23 @ 13:09):    <10,000 CFU/mL Normal Urogenital Alisia    Review of systems  Gen: No weight changes, fatigue, fevers/chills, weakness  Skin: No rashes  Head/Eyes/Ears/Mouth: No headache; Normal hearing; Normal vision w/o blurriness; No sinus pain/discomfort, sore throat  Respiratory: No dyspnea, cough, wheezing, hemoptysis  CV: No chest pain, PND, orthopnea  GI: no abdominal pain, denies diarrhea, constipation, nausea, vomiting, melena, hematochezia  : No increased frequency, dysuria, hematuria, nocturia  MSK: No joint pain/swelling; no back pain; no edema  Neuro: No dizziness/lightheadedness, weakness, seizures, numbness, tingling  Heme: No easy bruising or bleeding  Endo: No heat/cold intolerance  Psych: No significant nervousness, anxiety, stress, depression  All other systems were reviewed and are negative, except as noted.     PHYSICAL EXAM:  Constitutional: Well developed / well nourished  Eyes: Anicteric, PERRLA  ENMT: nc/at  Neck: supple  Respiratory: CTA B/L  Cardiovascular: RRR  Gastrointestinal: Soft, ND/NT. RLQ incision with some staples still in place . LIANA serous  Genitourinary: Voiding spontaneously  Extremities: SCD's in place and working bilaterally  Vascular: Palpable dp pulses bilaterally  Neurological: A&O x3  Skin: no rashes, ulcerations or lesions;  Musculoskeletal: Moving all extremities  Psychiatric: Responsive

## 2023-08-16 NOTE — PROGRESS NOTE ADULT - SUBJECTIVE AND OBJECTIVE BOX
[facility] DIVISION OF KIDNEY DISEASES AND HYPERTENSION -- FOLLOW UP NOTE  --------------------------------------------------------------------------------  Chief Complaint: history of DDRT on 7/16/23    HPI:   54 yo M with h/o ESRD on HD x6 years via LUE AVF (outpatient nephrologist: Dr. Shaka Sanchez), underwent DDRT on 7/16/23, DM2 CAD, CHF, underwent CABG 2015, AICD (2016), and severe PVD (underwent R big toe and second toe amputation in 2021) here for abdominal pain and constipation. Patient had a DGF post op and is still continuing to require iHD. Goes to [facility]. Last iHD was Friday, Aug 4th. Patient was planned to undergo a renal biopsy this week but had to be brought in by EMS for abdominal discomfort and constipation. Patient also with reduced intake of PO. Still makes urine. s/p renal biopsy on 8/9 showing AMR and diabetic nephrosclerosis (g1, PTC0-1, C4d 0). Total of 5 PLEX sessions, */11, 8/12,8/14.     24 hour events/subjective:  Due to PLEX #4 today.  Denies any headaches, fevers/chills, chest pain, palpitations, SOB, and leg swelling.    PAST HISTORY  --------------------------------------------------------------------------------  No significant changes to PMH, PSH, FHx, SHx, unless otherwise noted    ALLERGIES & MEDICATIONS  --------------------------------------------------------------------------------  Allergies    Contrast. (Unknown)    Intolerances      Standing Inpatient Medications  aspirin enteric coated 81 milliGRAM(s) Oral daily  atorvastatin 40 milliGRAM(s) Oral at bedtime  bisacodyl 5 milliGRAM(s) Oral at bedtime  carvedilol 3.125 milliGRAM(s) Oral every 12 hours  ceFAZolin   IVPB 1000 milliGRAM(s) IV Intermittent every 8 hours  dextrose 5%. 1000 milliLiter(s) IV Continuous <Continuous>  dextrose 5%. 1000 milliLiter(s) IV Continuous <Continuous>  dextrose 50% Injectable 25 Gram(s) IV Push once  dextrose 50% Injectable 12.5 Gram(s) IV Push once  dextrose 50% Injectable 25 Gram(s) IV Push once  famotidine    Tablet 20 milliGRAM(s) Oral daily  glucagon  Injectable 1 milliGRAM(s) IntraMuscular once  insulin glargine Injectable (LANTUS) 18 Unit(s) SubCutaneous at bedtime  insulin lispro (ADMELOG) corrective regimen sliding scale   SubCutaneous three times a day before meals  insulin lispro (ADMELOG) corrective regimen sliding scale   SubCutaneous at bedtime  insulin lispro Injectable (ADMELOG) 10 Unit(s) SubCutaneous three times a day before meals  mycophenolate mofetil 1000 milliGRAM(s) Oral two times a day  nystatin    Suspension 700684 Unit(s) Oral four times a day  predniSONE   Tablet 20 milliGRAM(s) Oral daily  senna 2 Tablet(s) Oral at bedtime  tacrolimus ER Tablet (ENVARSUS XR) 6 milliGRAM(s) Oral <User Schedule>  trimethoprim   80 mG/sulfamethoxazole 400 mG 1 Tablet(s) Oral daily  valGANciclovir 450 milliGRAM(s) Oral <User Schedule>    PRN Inpatient Medications  dextrose Oral Gel 15 Gram(s) Oral once PRN      REVIEW OF SYSTEMS  --------------------------------------------------------------------------------  per above     VITALS/PHYSICAL EXAM  --------------------------------------------------------------------------------  T(C): 37 (08-16-23 @ 05:05), Max: 37 (08-16-23 @ 05:05)  HR: 90 (08-16-23 @ 05:05) (81 - 90)  BP: 120/69 (08-16-23 @ 05:05) (111/67 - 142/70)  RR: 18 (08-16-23 @ 05:05) (18 - 18)  SpO2: 99% (08-16-23 @ 05:05) (98% - 100%)  Wt(kg): --        08-15-23 @ 07:01  -  08-16-23 @ 07:00  --------------------------------------------------------  IN: 360 mL / OUT: 825 mL / NET: -465 mL      Physical Exam:  	Gen: NAD  	HEENT: PERRL, supple neck, clear oropharynx  	Pulm: CTA B/L  	CV: RRR, S1S2; no rub  	Back: No spinal or CVA tenderness; no sacral edema  	Abd: +BS, soft, nontender/nondistended                      Transplant: No tenderness, swelling  	: No suprapubic tenderness  	UE: Warm, FROM; no edema; no asterixis  	LE: Warm, FROM; no edema  	Neuro: No focal deficits  	Psych: Normal affect and mood  	Skin: Warm, without rashes    LABS/STUDIES  --------------------------------------------------------------------------------              7.7    4.08  >-----------<  189      [08-16-23 @ 06:37]              23.9     136  |  104  |  46  ----------------------------<  148      [08-16-23 @ 06:38]  3.9   |  21  |  2.12        Ca     9.2     [08-16-23 @ 06:38]      iCa    1.30     [08-16 @ 06:42]      Mg     2.0     [08-16-23 @ 06:38]      Phos  2.3     [08-16-23 @ 06:38]      PT/INR: PT 10.3 , INR 0.93       [08-16-23 @ 06:38]  PTT: 25.0       [08-16-23 @ 06:38]      Creatinine Trend:  SCr 2.12 [08-16 @ 06:38]  SCr 2.09 [08-15 @ 06:18]  SCr 2.30 [08-14 @ 06:15]  SCr 2.35 [08-13 @ 06:00]  SCr 2.54 [08-12 @ 06:55]    Tacrolimus (), Serum: 7.8 ng/mL (08-15 @ 06:18)  Tacrolimus (), Serum: 8.3 ng/mL (08-14 @ 06:17)  Tacrolimus (), Serum: 8.6 ng/mL (08-13 @ 06:02)  Tacrolimus (), Serum: 4.7 ng/mL (08-12 @ 06:57)            Urinalysis - [08-16-23 @ 06:38]      Color  / Appearance  / SG  / pH       Gluc 148 / Ketone   / Bili  / Urobili        Blood  / Protein  / Leuk Est  / Nitrite       RBC  / WBC  / Hyaline  / Gran  / Sq Epi  / Non Sq Epi  / Bacteria

## 2023-08-16 NOTE — PROGRESS NOTE ADULT - PROBLEM SELECTOR PLAN 4
Phos is below goal.  Encourage PO intake. goal: 3.5-5.5    If you have any questions, please feel free to contact me  Shar Schaefer  Nephrology Fellow  [phone]; Prefer [employer] TEAMS  (After 5pm or on weekends please page the on-call fellow).

## 2023-08-16 NOTE — PROGRESS NOTE ADULT - PROBLEM SELECTOR PLAN 3
Pt. with anemia in the setting of ESRD/DGF. Hgb below goal. Check iron studies. Epo 10k given on 8/11

## 2023-08-16 NOTE — PROGRESS NOTE ADULT - PROBLEM SELECTOR PLAN 2
On Envarsus per goal level, Cellcept 1 gm BID, and prednisone 5 mg qd. Tacro trough noted 7.8 today. Check tacrolimus trough 30 minutes prior to AM dose, goal: 8-10. Pred held while patient on pulse dose steroids 8/8-

## 2023-08-17 LAB
ANION GAP SERPL CALC-SCNC: 14 MMOL/L — SIGNIFICANT CHANGE UP (ref 5–17)
APTT BLD: 25.6 SEC — SIGNIFICANT CHANGE UP (ref 24.5–35.6)
BASOPHILS # BLD AUTO: 0.01 K/UL — SIGNIFICANT CHANGE UP (ref 0–0.2)
BASOPHILS NFR BLD AUTO: 0.2 % — SIGNIFICANT CHANGE UP (ref 0–2)
BLD GP AB SCN SERPL QL: NEGATIVE — SIGNIFICANT CHANGE UP
BUN SERPL-MCNC: 42 MG/DL — HIGH (ref 7–23)
CA-I BLD-SCNC: 1.3 MMOL/L — SIGNIFICANT CHANGE UP (ref 1.15–1.33)
CALCIUM SERPL-MCNC: 9.3 MG/DL — SIGNIFICANT CHANGE UP (ref 8.4–10.5)
CHLORIDE SERPL-SCNC: 104 MMOL/L — SIGNIFICANT CHANGE UP (ref 96–108)
CO2 SERPL-SCNC: 21 MMOL/L — LOW (ref 22–31)
CREAT SERPL-MCNC: 2.1 MG/DL — HIGH (ref 0.5–1.3)
EGFR: 37 ML/MIN/1.73M2 — LOW
EOSINOPHIL # BLD AUTO: 0 K/UL — SIGNIFICANT CHANGE UP (ref 0–0.5)
EOSINOPHIL NFR BLD AUTO: 0 % — SIGNIFICANT CHANGE UP (ref 0–6)
FIBRINOGEN PPP-MCNC: 190 MG/DL — LOW (ref 200–445)
GLUCOSE BLDC GLUCOMTR-MCNC: 156 MG/DL — HIGH (ref 70–99)
GLUCOSE BLDC GLUCOMTR-MCNC: 187 MG/DL — HIGH (ref 70–99)
GLUCOSE BLDC GLUCOMTR-MCNC: 202 MG/DL — HIGH (ref 70–99)
GLUCOSE BLDC GLUCOMTR-MCNC: 278 MG/DL — HIGH (ref 70–99)
GLUCOSE SERPL-MCNC: 130 MG/DL — HIGH (ref 70–99)
HBV CORE IGM SER-ACNC: SIGNIFICANT CHANGE UP
HBV E AB SER-ACNC: SIGNIFICANT CHANGE UP
HBV E AG SER-ACNC: SIGNIFICANT CHANGE UP
HBV SURFACE AB SER-ACNC: REACTIVE
HBV SURFACE AG SER-ACNC: SIGNIFICANT CHANGE UP
HCT VFR BLD CALC: 23.6 % — LOW (ref 39–50)
HGB BLD-MCNC: 7.3 G/DL — LOW (ref 13–17)
IMM GRANULOCYTES NFR BLD AUTO: 4.6 % — HIGH (ref 0–0.9)
INR BLD: 1.04 RATIO — SIGNIFICANT CHANGE UP (ref 0.85–1.18)
LYMPHOCYTES # BLD AUTO: 0.17 K/UL — LOW (ref 1–3.3)
LYMPHOCYTES # BLD AUTO: 4.1 % — LOW (ref 13–44)
MAGNESIUM SERPL-MCNC: 1.9 MG/DL — SIGNIFICANT CHANGE UP (ref 1.6–2.6)
MCHC RBC-ENTMCNC: 30.9 GM/DL — LOW (ref 32–36)
MCHC RBC-ENTMCNC: 31.1 PG — SIGNIFICANT CHANGE UP (ref 27–34)
MCV RBC AUTO: 100.4 FL — HIGH (ref 80–100)
MONOCYTES # BLD AUTO: 0.51 K/UL — SIGNIFICANT CHANGE UP (ref 0–0.9)
MONOCYTES NFR BLD AUTO: 12.4 % — SIGNIFICANT CHANGE UP (ref 2–14)
NEUTROPHILS # BLD AUTO: 3.22 K/UL — SIGNIFICANT CHANGE UP (ref 1.8–7.4)
NEUTROPHILS NFR BLD AUTO: 78.7 % — HIGH (ref 43–77)
NRBC # BLD: 0 /100 WBCS — SIGNIFICANT CHANGE UP (ref 0–0)
PHOSPHATE SERPL-MCNC: 2.1 MG/DL — LOW (ref 2.5–4.5)
PLATELET # BLD AUTO: 172 K/UL — SIGNIFICANT CHANGE UP (ref 150–400)
POTASSIUM SERPL-MCNC: 4 MMOL/L — SIGNIFICANT CHANGE UP (ref 3.5–5.3)
POTASSIUM SERPL-SCNC: 4 MMOL/L — SIGNIFICANT CHANGE UP (ref 3.5–5.3)
PROTHROM AB SERPL-ACNC: 11.4 SEC — SIGNIFICANT CHANGE UP (ref 9.5–13)
RBC # BLD: 2.35 M/UL — LOW (ref 4.2–5.8)
RBC # FLD: 18.4 % — HIGH (ref 10.3–14.5)
RH IG SCN BLD-IMP: POSITIVE — SIGNIFICANT CHANGE UP
SODIUM SERPL-SCNC: 139 MMOL/L — SIGNIFICANT CHANGE UP (ref 135–145)
TACROLIMUS SERPL-MCNC: 8.6 NG/ML — SIGNIFICANT CHANGE UP
WBC # BLD: 4.1 K/UL — SIGNIFICANT CHANGE UP (ref 3.8–10.5)
WBC # FLD AUTO: 4.1 K/UL — SIGNIFICANT CHANGE UP (ref 3.8–10.5)

## 2023-08-17 PROCEDURE — 99232 SBSQ HOSP IP/OBS MODERATE 35: CPT | Mod: GC

## 2023-08-17 RX ORDER — SENNA PLUS 8.6 MG/1
2 TABLET ORAL AT BEDTIME
Refills: 0 | Status: DISCONTINUED | OUTPATIENT
Start: 2023-08-17 | End: 2023-08-20

## 2023-08-17 RX ADMIN — SENNA PLUS 2 TABLET(S): 8.6 TABLET ORAL at 21:59

## 2023-08-17 RX ADMIN — Medication 10 UNIT(S): at 13:18

## 2023-08-17 RX ADMIN — Medication 500000 UNIT(S): at 13:18

## 2023-08-17 RX ADMIN — Medication 6: at 18:03

## 2023-08-17 RX ADMIN — MYCOPHENOLATE MOFETIL 1000 MILLIGRAM(S): 250 CAPSULE ORAL at 09:10

## 2023-08-17 RX ADMIN — Medication 20 MILLIGRAM(S): at 06:43

## 2023-08-17 RX ADMIN — Medication 1 TABLET(S): at 13:19

## 2023-08-17 RX ADMIN — Medication 2: at 13:18

## 2023-08-17 RX ADMIN — Medication 63.75 MILLIMOLE(S): at 14:05

## 2023-08-17 RX ADMIN — Medication 500000 UNIT(S): at 06:42

## 2023-08-17 RX ADMIN — Medication 500000 UNIT(S): at 18:03

## 2023-08-17 RX ADMIN — Medication 10 UNIT(S): at 09:12

## 2023-08-17 RX ADMIN — CARVEDILOL PHOSPHATE 3.12 MILLIGRAM(S): 80 CAPSULE, EXTENDED RELEASE ORAL at 06:43

## 2023-08-17 RX ADMIN — Medication 100 MILLIGRAM(S): at 06:43

## 2023-08-17 RX ADMIN — Medication 2: at 09:11

## 2023-08-17 RX ADMIN — CARVEDILOL PHOSPHATE 3.12 MILLIGRAM(S): 80 CAPSULE, EXTENDED RELEASE ORAL at 18:03

## 2023-08-17 RX ADMIN — ATORVASTATIN CALCIUM 40 MILLIGRAM(S): 80 TABLET, FILM COATED ORAL at 21:59

## 2023-08-17 RX ADMIN — TACROLIMUS 6 MILLIGRAM(S): 5 CAPSULE ORAL at 09:11

## 2023-08-17 RX ADMIN — Medication 5 MILLIGRAM(S): at 21:59

## 2023-08-17 RX ADMIN — Medication 10 UNIT(S): at 18:03

## 2023-08-17 RX ADMIN — Medication 81 MILLIGRAM(S): at 13:19

## 2023-08-17 RX ADMIN — FAMOTIDINE 20 MILLIGRAM(S): 10 INJECTION INTRAVENOUS at 13:19

## 2023-08-17 RX ADMIN — MYCOPHENOLATE MOFETIL 1000 MILLIGRAM(S): 250 CAPSULE ORAL at 19:53

## 2023-08-17 RX ADMIN — INSULIN GLARGINE 18 UNIT(S): 100 INJECTION, SOLUTION SUBCUTANEOUS at 21:59

## 2023-08-17 NOTE — PROGRESS NOTE ADULT - PROBLEM SELECTOR PLAN 1
Pt. with h/o ESRD on HD. Underwent DDRT on 7/16/23. Post-transplant course significant for DGF. Still receiving iHD at [facility], last outpatient HD was 8/4/23. HD consent obtained from patient and placed in chart. Last HD done Mon 8/7. Renal biopsy done, showing AMR and diabetic nephrosclerosis (g1, ptc0-1 c4d 0).     Pt. is non-oliguric. Pt. clinically stable. Labs reviewed. Continue with immunosuppression regimen, as outlined below. Monitor labs and urine output. Avoid nephrotoxins. Dose medications as per eGFR. Plan for PLEX on 8/11, 8/12, 8/14 and 8/16. DSA pending. Will plan for Rituximab on Friday after 5th PLEX    On antibiotics for klebsiella UTI, sensitive to ceftriaxone (8/7-8/10); then on Cefazolin until today.

## 2023-08-17 NOTE — PROGRESS NOTE ADULT - PROBLEM SELECTOR PLAN 2
On Envarsus per goal level, Cellcept 1 gm BID, and prednisone 5 mg qd. Tacro trough noted 8.6 today. Check tacrolimus trough 30 minutes prior to AM dose, goal: 8-10. Pred held while patient on pulse dose steroids 8/8-

## 2023-08-17 NOTE — PROGRESS NOTE ADULT - SUBJECTIVE AND OBJECTIVE BOX
[facility]   MD DANIEL WALDEN MD BRIANA PETITO, NP    TEL:  FROM 9 AM to 5 PM ---OFFICE: [phone]  FROM 5 PM - 9 AM PLEASE CALL ANSWERING SERVICE: [phone]     RENAL PROGRESS NOTE: DATE OF SERVICE 08-17-23 @ 16:17  Patient is a 53y old  Male who presents with a chief complaint of UTI, fecal impaction  Patient seen and examined at bedside. No chest pain/sob    VITALS:  T(F): 97.8 (08-17-23 @ 14:10), Max: 99.4 (08-17-23 @ 01:08)  HR: 87 (08-17-23 @ 14:10)  BP: 128/71 (08-17-23 @ 14:10)  RR: 17 (08-17-23 @ 14:10)  SpO2: 100% (08-17-23 @ 14:10)  Wt(kg): --    08-16 @ 07:01  -  08-17 @ 07:00  --------------------------------------------------------  IN: 1320 mL / OUT: 1040 mL / NET: 280 mL    08-17 @ 07:01  -  08-17 @ 16:17  --------------------------------------------------------  IN: 580 mL / OUT: 330 mL / NET: 250 mL      PHYSICAL EXAM:  Constitutional: NAD  Neck: No JVD  Respiratory: CTAB, no wheezes, rales or rhonchi  Cardiovascular: S1, S2, RRR  Gastrointestinal: BS+, soft, NT/ND. LIANA drain   Extremities: No peripheral edema  Access: AVF --> palpable thrill, bruit auscultated     Hospital Medications:   MEDICATIONS  (STANDING):  aspirin enteric coated 81 milliGRAM(s) Oral daily  atorvastatin 40 milliGRAM(s) Oral at bedtime  carvedilol 3.125 milliGRAM(s) Oral every 12 hours  dextrose 5%. 1000 milliLiter(s) (100 mL/Hr) IV Continuous <Continuous>  dextrose 5%. 1000 milliLiter(s) (50 mL/Hr) IV Continuous <Continuous>  dextrose 50% Injectable 25 Gram(s) IV Push once  dextrose 50% Injectable 12.5 Gram(s) IV Push once  dextrose 50% Injectable 25 Gram(s) IV Push once  famotidine    Tablet 20 milliGRAM(s) Oral daily  glucagon  Injectable 1 milliGRAM(s) IntraMuscular once  insulin glargine Injectable (LANTUS) 18 Unit(s) SubCutaneous at bedtime  insulin lispro (ADMELOG) corrective regimen sliding scale   SubCutaneous three times a day before meals  insulin lispro (ADMELOG) corrective regimen sliding scale   SubCutaneous at bedtime  insulin lispro Injectable (ADMELOG) 10 Unit(s) SubCutaneous three times a day before meals  mycophenolate mofetil 1000 milliGRAM(s) Oral two times a day  nystatin    Suspension 410045 Unit(s) Oral four times a day  predniSONE   Tablet 20 milliGRAM(s) Oral daily  sodium phosphate 15 milliMole(s)/250 mL IVPB 15 milliMole(s) IV Intermittent once  tacrolimus ER Tablet (ENVARSUS XR) 6 milliGRAM(s) Oral <User Schedule>  trimethoprim   80 mG/sulfamethoxazole 400 mG 1 Tablet(s) Oral daily  valGANciclovir 450 milliGRAM(s) Oral <User Schedule>    Tacrolimus (), Serum: 8.6 ng/mL (08-17 @ 07:08)    LABS:  08-17    139  |  104  |  42<H>  ----------------------------<  130<H>  4.0   |  21<L>  |  2.10<H>    Ca    9.3      17 Aug 2023 07:08  Phos  2.1     08-17  Mg     1.9     08-17      Creatinine Trend: 2.10 <--, 2.12 <--, 2.09 <--, 2.30 <--, 2.35 <--, 2.54 <--, 3.35 <--    Phosphorus: 2.1 mg/dL (08-17 @ 07:08)                              7.3    4.10  )-----------( 172      ( 17 Aug 2023 07:08 )             23.6     Urine Studies:  Urinalysis - [08-17-23 @ 07:08]      Color  / Appearance  / SG  / pH       Gluc 130 / Ketone   / Bili  / Urobili        Blood  / Protein  / Leuk Est  / Nitrite       RBC  / WBC  / Hyaline  / Gran  / Sq Epi  / Non Sq Epi  / Bacteria       Iron 68, TIBC 185, %sat 37      [08-16-23 @ 06:39]  Ferritin 1010      [08-16-23 @ 06:39]    HBsAg Nonreact      [08-17-23 @ 11:28]      RADIOLOGY & ADDITIONAL STUDIES:

## 2023-08-17 NOTE — PROGRESS NOTE ADULT - ASSESSMENT
52 yo M with hx of DM2 CAD, CHF, s/p CABG 2015, AICD (2016), on hemodialysis for approximately 6 years via L AVF (nephrologist Dr. Bharath Romo), He has hx of PVD, is s/p 4 stents in R leg (mid and distal right superficial femoral artery, right popliteal x2 on 1/14/22).; is sp RT big toe and second toe amputation 2021, on asa and plavix for severe PVD (per pt). Now here for possible DDRT. Pt reports does not make urine only few drops occasionally. Pt denies N/V/D, fevers/chills, CP, SOB. Pt reports last ate at 4pm today and had HD yesterday 7/14/23.  Nephrology consulted for ESRD s/p DDRT.      s/p DDRT @ [facility] 7/16/2023   Initially received Simulect induction -- Changed to Thymoglobulin given delayed graft function.  Pt discharged on previous admission on HD.   Post transport course significant for DGF  Via L AVF --> s/p Left Radiocephalic fistulogram and subclavian vein angioplasty with Vascular 7/24/2023  Consent obtained from patient and placed in chart.  s/p CT A& P No contrast 8/7/23 --> partially imaged L pleural effusion, unchanged. Partially  imaged LLL opacity again seen.  small amount of dependent air within urinary bladder, small renal cysts, left transplant kidney with mil hydro. Stent extends from kidney to bladder.   s/p Doppler Renal US Transplant kidney 8/7/23 --> patent renal vasculature. Elevated renal artery peak systolic velocities. Concern for possible renal artery stenosis at anastomosis. Mild elevation  of intrarenal resistive indices , grossly unchanged from prior exam.   Continue Immunosuppression per transplant : Envarsus per level ( 6mg) , Cellcept 1 gm bid, and pulse steroids, prednisone held while pt on pulse steroids   Tacro trough is 8.6 on 8/17/23    s/p Transplant renal biopsy 8/09 --Renal biopsy  showing AMR and diabetic nephrosclerosis (g1, ptc0-1 c4d 0). Plan for total 5 sessions of PLEX, s/p PLEX on  8/11, 8/12. #4 8/16, plan for Rituximab Friday 8/18. DSA labs sent 8/16  Monitor labs and urine output. Avoid nephrotoxins  HD per transplant, last HD 8/7 . Pt off Furosemide.   Dose medications as per eGFR.  Renal Diet   Monitor     Anemia  Mgmt per Transplant   LUX  Maintain Hgb > 8  Monitor     HTN:   BP fluctuates   Monitor     Proteinuria / Hematuria   likely in setting of + LE, bacteria  Urine cx + Klebsiella   On Cefazolin  Mgmt per Transplant ID

## 2023-08-17 NOTE — PROGRESS NOTE ADULT - SUBJECTIVE AND OBJECTIVE BOX
[facility] DIVISION OF KIDNEY DISEASES AND HYPERTENSION -- FOLLOW UP NOTE  --------------------------------------------------------------------------------  Chief Complaint: history of DDRT on 7/16/23    HPI:   52 yo M with h/o ESRD on HD x6 years via LUE AVF (outpatient nephrologist: Dr. Shaka Sanchez), underwent DDRT on 7/16/23, DM2 CAD, CHF, underwent CABG 2015, AICD (2016), and severe PVD (underwent R big toe and second toe amputation in 2021) here for abdominal pain and constipation. Patient had a DGF post op and is still continuing to require iHD. Goes to [facility]. Last iHD was Friday, Aug 4th. Patient was planned to undergo a renal biopsy this week but had to be brought in by EMS for abdominal discomfort and constipation. Patient also with reduced intake of PO. Still makes urine. s/p renal biopsy on 8/9 showing AMR and diabetic nephrosclerosis (g1, PTC0-1, C4d 0). Total of 5 PLEX sessions, */11, 8/12,8/14.     24 hour events/subjective:  Patient complaining of some diarrhea today.  Denies any headaches, fevers/chills, chest pain, palpitations, SOB, and leg swelling.      PAST HISTORY  --------------------------------------------------------------------------------  No significant changes to PMH, PSH, FHx, SHx, unless otherwise noted    ALLERGIES & MEDICATIONS  --------------------------------------------------------------------------------  Allergies    Contrast. (Unknown)    Intolerances      Standing Inpatient Medications  aspirin enteric coated 81 milliGRAM(s) Oral daily  atorvastatin 40 milliGRAM(s) Oral at bedtime  carvedilol 3.125 milliGRAM(s) Oral every 12 hours  dextrose 5%. 1000 milliLiter(s) IV Continuous <Continuous>  dextrose 5%. 1000 milliLiter(s) IV Continuous <Continuous>  dextrose 50% Injectable 25 Gram(s) IV Push once  dextrose 50% Injectable 12.5 Gram(s) IV Push once  dextrose 50% Injectable 25 Gram(s) IV Push once  famotidine    Tablet 20 milliGRAM(s) Oral daily  glucagon  Injectable 1 milliGRAM(s) IntraMuscular once  insulin glargine Injectable (LANTUS) 18 Unit(s) SubCutaneous at bedtime  insulin lispro (ADMELOG) corrective regimen sliding scale   SubCutaneous three times a day before meals  insulin lispro (ADMELOG) corrective regimen sliding scale   SubCutaneous at bedtime  insulin lispro Injectable (ADMELOG) 10 Unit(s) SubCutaneous three times a day before meals  mycophenolate mofetil 1000 milliGRAM(s) Oral two times a day  nystatin    Suspension 023047 Unit(s) Oral four times a day  predniSONE   Tablet 20 milliGRAM(s) Oral daily  sodium phosphate 15 milliMole(s)/250 mL IVPB 15 milliMole(s) IV Intermittent once  tacrolimus ER Tablet (ENVARSUS XR) 6 milliGRAM(s) Oral <User Schedule>  trimethoprim   80 mG/sulfamethoxazole 400 mG 1 Tablet(s) Oral daily  valGANciclovir 450 milliGRAM(s) Oral <User Schedule>    PRN Inpatient Medications  dextrose Oral Gel 15 Gram(s) Oral once PRN      REVIEW OF SYSTEMS  --------------------------------------------------------------------------------  per above     VITALS/PHYSICAL EXAM  --------------------------------------------------------------------------------  T(C): 36.9 (08-17-23 @ 09:00), Max: 37.4 (08-17-23 @ 01:08)  HR: 83 (08-17-23 @ 09:00) (76 - 89)  BP: 142/72 (08-17-23 @ 09:00) (101/52 - 142/72)  RR: 18 (08-17-23 @ 09:00) (18 - 20)  SpO2: 100% (08-17-23 @ 09:00) (100% - 100%)  Wt(kg): --        08-16-23 @ 07:01  -  08-17-23 @ 07:00  --------------------------------------------------------  IN: 1320 mL / OUT: 1040 mL / NET: 280 mL    08-17-23 @ 07:01  -  08-17-23 @ 10:47  --------------------------------------------------------  IN: 0 mL / OUT: 200 mL / NET: -200 mL      Physical Exam:  	Gen: NAD  	HEENT: PERRL, supple neck, clear oropharynx  	Pulm: CTA B/L  	CV: RRR, S1S2; no rub  	Back: No spinal or CVA tenderness; no sacral edema  	Abd: +BS, soft, nontender/nondistended                      Transplant: No tenderness, swelling  	: No suprapubic tenderness  	UE: Warm, FROM; no edema; no asterixis  	LE: Warm, FROM; no edema  	Neuro: No focal deficits  	Psych: Normal affect and mood  	Skin: Warm, without rashes    LABS/STUDIES  --------------------------------------------------------------------------------              7.3    4.10  >-----------<  172      [08-17-23 @ 07:08]              23.6     139  |  104  |  42  ----------------------------<  130      [08-17-23 @ 07:08]  4.0   |  21  |  2.10        Ca     9.3     [08-17-23 @ 07:08]      iCa    1.30     [08-17 @ 07:55]      Mg     1.9     [08-17-23 @ 07:08]      Phos  2.1     [08-17-23 @ 07:08]      PT/INR: PT 11.4 , INR 1.04       [08-17-23 @ 07:13]  PTT: 25.6       [08-17-23 @ 07:13]      Creatinine Trend:  SCr 2.10 [08-17 @ 07:08]  SCr 2.12 [08-16 @ 06:38]  SCr 2.09 [08-15 @ 06:18]  SCr 2.30 [08-14 @ 06:15]  SCr 2.35 [08-13 @ 06:00]    Tacrolimus (), Serum: 8.6 ng/mL (08-17 @ 07:08)  Tacrolimus (), Serum: 8.3 ng/mL (08-16 @ 06:37)  Tacrolimus (), Serum: 7.8 ng/mL (08-15 @ 06:18)  Tacrolimus (), Serum: 8.3 ng/mL (08-14 @ 06:17)            Urinalysis - [08-17-23 @ 07:08]      Color  / Appearance  / SG  / pH       Gluc 130 / Ketone   / Bili  / Urobili        Blood  / Protein  / Leuk Est  / Nitrite       RBC  / WBC  / Hyaline  / Gran  / Sq Epi  / Non Sq Epi  / Bacteria       Iron 68, TIBC 185, %sat 37      [08-16-23 @ 06:39]  Ferritin 1010      [08-16-23 @ 06:39]

## 2023-08-17 NOTE — PROGRESS NOTE ADULT - ASSESSMENT
54 y/o M with past medical history significant for HTN, CHF (s/p AICD 2016), CAD (s/p CABG 2015), IDDM, PVD s/p stent x4 in RLE with R big toe/second toe amputation (2021) and ESRD on HD via LUE AVF for 6 years now s/p DDRT 7/16/2023 with Thymoglobulin induction.   Post-transplant course complicated by severe constipation requiring disimpaction, DGF requiring hemodialysis and LUE swelling with concern for L subclavian thrombus, now s/p IR fistulogram 7/7/23 with balloon angioplasty of subclavian vein.   discharged home on 7/27/23 and remained on HD MWF   He presents from home via EMS to [facility] on 8/7/23 with abdominal discomfort and no bowel movement for 3 days.     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

## 2023-08-17 NOTE — PROGRESS NOTE ADULT - PROBLEM SELECTOR PLAN 3
Pt. with anemia in the setting of ESRD/DGF. Hgb below goal. Iron studies reviewed. Epo 10k given on 8/11. Will give 1u PRBC today and consider epo tomorrow.

## 2023-08-17 NOTE — PROGRESS NOTE ADULT - SUBJECTIVE AND OBJECTIVE BOX
Subjective: Patient seen and examined. No new events except as noted.     SUBJECTIVE/ROS:  No chest pain, dyspnea, palpitation, or dizziness.       MEDICATIONS:  MEDICATIONS  (STANDING):  aspirin enteric coated 81 milliGRAM(s) Oral daily  atorvastatin 40 milliGRAM(s) Oral at bedtime  bisacodyl 5 milliGRAM(s) Oral at bedtime  carvedilol 3.125 milliGRAM(s) Oral every 12 hours  dextrose 5%. 1000 milliLiter(s) (50 mL/Hr) IV Continuous <Continuous>  dextrose 5%. 1000 milliLiter(s) (100 mL/Hr) IV Continuous <Continuous>  dextrose 50% Injectable 25 Gram(s) IV Push once  dextrose 50% Injectable 12.5 Gram(s) IV Push once  dextrose 50% Injectable 25 Gram(s) IV Push once  famotidine    Tablet 20 milliGRAM(s) Oral daily  glucagon  Injectable 1 milliGRAM(s) IntraMuscular once  insulin glargine Injectable (LANTUS) 18 Unit(s) SubCutaneous at bedtime  insulin lispro (ADMELOG) corrective regimen sliding scale   SubCutaneous three times a day before meals  insulin lispro (ADMELOG) corrective regimen sliding scale   SubCutaneous at bedtime  insulin lispro Injectable (ADMELOG) 10 Unit(s) SubCutaneous three times a day before meals  mycophenolate mofetil 1000 milliGRAM(s) Oral two times a day  nystatin    Suspension 343533 Unit(s) Oral four times a day  predniSONE   Tablet 20 milliGRAM(s) Oral daily  senna 2 Tablet(s) Oral at bedtime  tacrolimus ER Tablet (ENVARSUS XR) 6 milliGRAM(s) Oral <User Schedule>  trimethoprim   80 mG/sulfamethoxazole 400 mG 1 Tablet(s) Oral daily  valGANciclovir 450 milliGRAM(s) Oral <User Schedule>      PHYSICAL EXAM:  T(C): 37.1 (08-17-23 @ 06:42), Max: 37.4 (08-17-23 @ 01:08)  HR: 89 (08-17-23 @ 06:42) (76 - 89)  BP: 124/65 (08-17-23 @ 06:42) (101/52 - 136/73)  RR: 18 (08-17-23 @ 06:42) (18 - 20)  SpO2: 100% (08-17-23 @ 06:42) (100% - 100%)  Wt(kg): --  I&O's Summary    15 Aug 2023 07:01  -  16 Aug 2023 07:00  --------------------------------------------------------  IN: 360 mL / OUT: 825 mL / NET: -465 mL    16 Aug 2023 07:01  -  17 Aug 2023 06:55  --------------------------------------------------------  IN: 1080 mL / OUT: 840 mL / NET: 240 mL            JVP: Normal  Neck: supple  Lung: clear   CV: S1 S2 , Murmur:  Abd: soft  Ext: No edema  neuro: Awake / alert  Psych: flat affect  Skin: normal``    LABS/DATA:    CARDIAC MARKERS:                                7.7    4.08  )-----------( 189      ( 16 Aug 2023 06:37 )             23.9     08-16    136  |  104  |  46<H>  ----------------------------<  148<H>  3.9   |  21<L>  |  2.12<H>    Ca    9.2      16 Aug 2023 06:38  Phos  2.3     08-16  Mg     2.0     08-16      proBNP:   Lipid Profile:   HgA1c:   TSH:     TELE:  EKG:

## 2023-08-17 NOTE — PROGRESS NOTE ADULT - SUBJECTIVE AND OBJECTIVE BOX
Transplant Surgery - Multidisciplinary Progress Note  --------------------------------------------------------------  DDRT 7/16/2023    Present:    Patient seen and examined with multidisciplinary team including Transplant Surgeon: Dr. Delacruz. Transplant Nephrologist: Dr. JONO Corado Transplant Pharmacist: SAEID Plunkett/SHERIE Chris and unit RN during am rounds.  Disciplines not in attendance will be notified of the plan.     HPI: Tee Salvador is a 52 y/o M with past medical history significant for HTN, CHF (s/p AICD 2016--last interrogated 7/15/23), CAD (s/p CABG 2015), IDDM, PVD s/p stent x4 in RLE with R big toe/second toe amputation (2021) and ESRD on HD via LUE AVF for 6 years now s/p DDRT 7/16/2023 with Thymoglobulin induction. Post-transplant course complicated by severe constipation requiring disimpaction, DGF requiring hemodialysis and LUE swelling with concern for L subclavian thrombus, now s/p IR fistulogram 7/7/23 with balloon angioplasty of subclavian vein. He was discharged home on 7/27/23 and remained on HD MWF at home center [facility]. He presents from home via EMS to [facility] on 8/7/23 with constipation and continued DGF.     Interval Events:  - bx with concern for AMR, tolerating PLEX/IVIG/steroids.   - no complaints at this time  - Denies any Abdominal Pain.  - Have BM, passing gas  - Cr:  2.10 UOP: 750cc.   - LIANA OP: 90cc.   -  VSS, Afebrile.   - Pt denies CP/SOB, fever/chills, N/V.     Immunosuppression:  Envarsus per level  MMF 1/1  pulse steroids with taper    Potential Discharge date: TBD    Education:  Medications    Plan of care:  See Below      MEDICATIONS  (STANDING):  aspirin enteric coated 81 milliGRAM(s) Oral daily  atorvastatin 40 milliGRAM(s) Oral at bedtime  carvedilol 3.125 milliGRAM(s) Oral every 12 hours  dextrose 5%. 1000 milliLiter(s) (100 mL/Hr) IV Continuous <Continuous>  dextrose 5%. 1000 milliLiter(s) (50 mL/Hr) IV Continuous <Continuous>  dextrose 50% Injectable 25 Gram(s) IV Push once  dextrose 50% Injectable 12.5 Gram(s) IV Push once  dextrose 50% Injectable 25 Gram(s) IV Push once  famotidine    Tablet 20 milliGRAM(s) Oral daily  glucagon  Injectable 1 milliGRAM(s) IntraMuscular once  insulin glargine Injectable (LANTUS) 18 Unit(s) SubCutaneous at bedtime  insulin lispro (ADMELOG) corrective regimen sliding scale   SubCutaneous three times a day before meals  insulin lispro (ADMELOG) corrective regimen sliding scale   SubCutaneous at bedtime  insulin lispro Injectable (ADMELOG) 10 Unit(s) SubCutaneous three times a day before meals  mycophenolate mofetil 1000 milliGRAM(s) Oral two times a day  nystatin    Suspension 262331 Unit(s) Oral four times a day  predniSONE   Tablet 20 milliGRAM(s) Oral daily  sodium phosphate 15 milliMole(s)/250 mL IVPB 15 milliMole(s) IV Intermittent once  tacrolimus ER Tablet (ENVARSUS XR) 6 milliGRAM(s) Oral <User Schedule>  trimethoprim   80 mG/sulfamethoxazole 400 mG 1 Tablet(s) Oral daily  valGANciclovir 450 milliGRAM(s) Oral <User Schedule>    MEDICATIONS  (PRN):  dextrose Oral Gel 15 Gram(s) Oral once PRN Blood Glucose LESS THAN 70 milliGRAM(s)/deciliter      PAST MEDICAL & SURGICAL HISTORY:  Diabetes mellitus  type 2      Foot ulcer due to secondary DM      Coronary artery disease      Acute on chronic systolic heart failure      H/O kidney transplant      Breast Reduction  at age 18      Toe amputation status, left      S/P CABG (coronary artery bypass graft)      AICD (automatic cardioverter/defibrillator) present          Vital Signs Last 24 Hrs  T(C): 36.6 (17 Aug 2023 14:10), Max: 37.4 (17 Aug 2023 01:08)  T(F): 97.8 (17 Aug 2023 14:10), Max: 99.4 (17 Aug 2023 01:08)  HR: 87 (17 Aug 2023 14:10) (76 - 89)  BP: 128/71 (17 Aug 2023 14:10) (101/52 - 142/72)  BP(mean): --  RR: 17 (17 Aug 2023 14:10) (17 - 18)  SpO2: 100% (17 Aug 2023 14:10) (100% - 100%)    Parameters below as of 17 Aug 2023 14:10  Patient On (Oxygen Delivery Method): room air        I&O's Summary    16 Aug 2023 07:01  -  17 Aug 2023 07:00  --------------------------------------------------------  IN: 1320 mL / OUT: 1040 mL / NET: 280 mL    17 Aug 2023 07:01  -  17 Aug 2023 16:43  --------------------------------------------------------  IN: 580 mL / OUT: 330 mL / NET: 250 mL                              7.3    4.10  )-----------( 172      ( 17 Aug 2023 07:08 )             23.6     08-17    139  |  104  |  42<H>  ----------------------------<  130<H>  4.0   |  21<L>  |  2.10<H>    Ca    9.3      17 Aug 2023 07:08  Phos  2.1     08-17  Mg     1.9     08-17      Tacrolimus (), Serum: 8.6 ng/mL (08-17 @ 07:08)        Culture - Urine (collected 08-14-23 @ 11:24)  Source: Clean Catch Clean Catch (Midstream)  Final Report (08-15-23 @ 13:09):    <10,000 CFU/mL Normal Urogenital Alisia    Review of systems  Gen: No weight changes, fatigue, fevers/chills, weakness  Skin: No rashes  Head/Eyes/Ears/Mouth: No headache; Normal hearing; Normal vision w/o blurriness; No sinus pain/discomfort, sore throat  Respiratory: No dyspnea, cough, wheezing, hemoptysis  CV: No chest pain, PND, orthopnea  GI: no abdominal pain, denies diarrhea, constipation, nausea, vomiting, melena, hematochezia  : No increased frequency, dysuria, hematuria, nocturia  MSK: No joint pain/swelling; no back pain; no edema  Neuro: No dizziness/lightheadedness, weakness, seizures, numbness, tingling  Heme: No easy bruising or bleeding  Endo: No heat/cold intolerance  Psych: No significant nervousness, anxiety, stress, depression  All other systems were reviewed and are negative, except as noted.     PHYSICAL EXAM:  Constitutional: Well developed / well nourished  Eyes: Anicteric, PERRLA  ENMT: nc/at  Neck: supple  Respiratory: CTA B/L  Cardiovascular: RRR  Gastrointestinal: Soft, ND/NT. RLQ incision with some staples still in place . LIANA serous  Genitourinary: Voiding spontaneously  Extremities: SCD's in place and working bilaterally  Vascular: Palpable dp pulses bilaterally  Neurological: A&O x3  Skin: no rashes, ulcerations or lesions;  Musculoskeletal: Moving all extremities  Psychiatric: Responsive

## 2023-08-17 NOTE — PROGRESS NOTE ADULT - ASSESSMENT
Tee Salvador is a 52 y/o M with past medical history significant for HTN, CHF (s/p AICD 2016--last interrogated 7/15/23), CAD (s/p CABG 2015), IDDM, PVD s/p stent x4 in RLE with R big toe/second toe amputation (2021) and ESRD on HD via LUE AVF for 6 years now s/p DDRT 7/16/2023 with Thymoglobulin induction. Post-transplant course complicated by severe constipation requiring disimpaction, DGF requiring hemodialysis and LUE swelling with concern for L subclavian thrombus, now s/p IR fistulogram 7/7/23 with balloon angioplasty of subclavian vein. He was discharged home on 7/27/23 and remained on HD MWF at home center [facility]. He presents from home via EMS to [facility] on 8/7/23 with constipation and continued DGF    [ ] DGF s/p DDRT 7/16/23  -S/P IR biopsy on 8/10 with concern for AMR. will continue PLEX/IVIG (plan for 5 sessions total, PLEX/IVIG#5 tomorrow 8/18), plan for Rituximab Friday 8/18  -DSA+ : will continue pulse dose steroids with taper (500 on 8/8, 250 on 8/9, 125 on 8/10, 60 on 8/12).  Prednisone 20 mg daily  - DSA labs sent today 8/16.   -graft function improving  -last HD on 8/7, furosemide on Hold.   -UTI: Klebsiella, CTX-->Ancef (total of 7D per ID), last day on 8/16, follow up repeat Ucx: Neg.   -BCx negative  -LIANA dced today.  -bowel regimen  -diet as tolerated  -SCDs  -last epogen 8/11, stable h/h. continue ASA    [ ] Immunosuppression  -Envarsus per level  -MMF 1/1  -steroids as above  -PPx regimen: Valcyte/Bactrim/Nystatin/PPI with renal dosing    [ ] DM  -Lantus/Lispro adjust while on steroids.     # [id number]  Transplant PA

## 2023-08-17 NOTE — PROGRESS NOTE ADULT - ASSESSMENT
CAD s/p cabg  stable   asa, statin    chronic systolic chf  stable  improving LV function   on coreg   off ace/arb/arni for now   s/p ICD    ESRD  s/p transplant   fu with transplant team   crt improving     I will be away till Aug 23rd. Dr Fontenot is covering me for any urgent cardiac issues.

## 2023-08-17 NOTE — PROGRESS NOTE ADULT - SUBJECTIVE AND OBJECTIVE BOX
Patient is a 53y old  Male who presents with a chief complaint of UTI, fecal impaction (17 Aug 2023 10:47)      DATE OF SERVICE: 08-17-23 @ 16:07    SUBJECTIVE / OVERNIGHT EVENTS: overnight events noted    ROS:  Resp: No cough no sputum production  CVS: No chest pain no palpitations no orthopnea  GI: no N/V/D  "I feel fine'       MEDICATIONS  (STANDING):  aspirin enteric coated 81 milliGRAM(s) Oral daily  atorvastatin 40 milliGRAM(s) Oral at bedtime  carvedilol 3.125 milliGRAM(s) Oral every 12 hours  dextrose 5%. 1000 milliLiter(s) (100 mL/Hr) IV Continuous <Continuous>  dextrose 5%. 1000 milliLiter(s) (50 mL/Hr) IV Continuous <Continuous>  dextrose 50% Injectable 25 Gram(s) IV Push once  dextrose 50% Injectable 12.5 Gram(s) IV Push once  dextrose 50% Injectable 25 Gram(s) IV Push once  famotidine    Tablet 20 milliGRAM(s) Oral daily  glucagon  Injectable 1 milliGRAM(s) IntraMuscular once  insulin glargine Injectable (LANTUS) 18 Unit(s) SubCutaneous at bedtime  insulin lispro (ADMELOG) corrective regimen sliding scale   SubCutaneous three times a day before meals  insulin lispro (ADMELOG) corrective regimen sliding scale   SubCutaneous at bedtime  insulin lispro Injectable (ADMELOG) 10 Unit(s) SubCutaneous three times a day before meals  mycophenolate mofetil 1000 milliGRAM(s) Oral two times a day  nystatin    Suspension 297583 Unit(s) Oral four times a day  predniSONE   Tablet 20 milliGRAM(s) Oral daily  sodium phosphate 15 milliMole(s)/250 mL IVPB 15 milliMole(s) IV Intermittent once  tacrolimus ER Tablet (ENVARSUS XR) 6 milliGRAM(s) Oral <User Schedule>  trimethoprim   80 mG/sulfamethoxazole 400 mG 1 Tablet(s) Oral daily  valGANciclovir 450 milliGRAM(s) Oral <User Schedule>    MEDICATIONS  (PRN):  dextrose Oral Gel 15 Gram(s) Oral once PRN Blood Glucose LESS THAN 70 milliGRAM(s)/deciliter        CAPILLARY BLOOD GLUCOSE      POCT Blood Glucose.: 187 mg/dL (17 Aug 2023 12:32)  POCT Blood Glucose.: 156 mg/dL (17 Aug 2023 09:07)  POCT Blood Glucose.: 144 mg/dL (16 Aug 2023 20:57)  POCT Blood Glucose.: 240 mg/dL (16 Aug 2023 17:20)    I&O's Summary    16 Aug 2023 07:01  -  17 Aug 2023 07:00  --------------------------------------------------------  IN: 1320 mL / OUT: 1040 mL / NET: 280 mL    17 Aug 2023 07:01  -  17 Aug 2023 16:07  --------------------------------------------------------  IN: 580 mL / OUT: 330 mL / NET: 250 mL        Vital Signs Last 24 Hrs  T(C): 36.6 (17 Aug 2023 14:10), Max: 37.4 (17 Aug 2023 01:08)  T(F): 97.8 (17 Aug 2023 14:10), Max: 99.4 (17 Aug 2023 01:08)  HR: 87 (17 Aug 2023 14:10) (76 - 89)  BP: 128/71 (17 Aug 2023 14:10) (101/52 - 142/72)  BP(mean): --  RR: 17 (17 Aug 2023 14:10) (17 - 18)  SpO2: 100% (17 Aug 2023 14:10) (100% - 100%)      PHYSICAL EXAM:  EYES: EOMI, PERRLA  CHEST/LUNG: clear   HEART: S1 S2; no murmurs   ABDOMEN: Soft, Nontender  EXTREMITIES:  no edema  NEUROLOGY: AO x 3 non-focal  SKIN: No rashes or lesions    LABS:                        7.3    4.10  )-----------( 172      ( 17 Aug 2023 07:08 )             23.6     08-17    139  |  104  |  42<H>  ----------------------------<  130<H>  4.0   |  21<L>  |  2.10<H>    Ca    9.3      17 Aug 2023 07:08  Phos  2.1     08-17  Mg     1.9     08-17      PT/INR - ( 17 Aug 2023 07:13 )   PT: 11.4 sec;   INR: 1.04 ratio         PTT - ( 17 Aug 2023 07:13 )  PTT:25.6 sec      Urinalysis Basic - ( 17 Aug 2023 07:08 )    Color: x / Appearance: x / SG: x / pH: x  Gluc: 130 mg/dL / Ketone: x  / Bili: x / Urobili: x   Blood: x / Protein: x / Nitrite: x   Leuk Esterase: x / RBC: x / WBC x   Sq Epi: x / Non Sq Epi: x / Bacteria: x          All consultant(s) notes reviewed and care discussed with other providers        Contact Number, Dr Murphy [phone]

## 2023-08-17 NOTE — PROGRESS NOTE ADULT - PROBLEM SELECTOR PLAN 4
Phos is below goal.  Replete phos. Encourage PO intake goal: 3.5-5.5    If you have any questions, please feel free to contact me  Shar Schaefer  Nephrology Fellow  [phone]; Prefer [employer] TEAMS  (After 5pm or on weekends please page the on-call fellow).

## 2023-08-18 LAB
ANION GAP SERPL CALC-SCNC: 12 MMOL/L — SIGNIFICANT CHANGE UP (ref 5–17)
APTT BLD: 25.7 SEC — SIGNIFICANT CHANGE UP (ref 24.5–35.6)
BASOPHILS # BLD AUTO: 0.02 K/UL — SIGNIFICANT CHANGE UP (ref 0–0.2)
BASOPHILS NFR BLD AUTO: 0.4 % — SIGNIFICANT CHANGE UP (ref 0–2)
BUN SERPL-MCNC: 38 MG/DL — HIGH (ref 7–23)
CA-I BLD-SCNC: 1.32 MMOL/L — SIGNIFICANT CHANGE UP (ref 1.15–1.33)
CALCIUM SERPL-MCNC: 9.2 MG/DL — SIGNIFICANT CHANGE UP (ref 8.4–10.5)
CHLORIDE SERPL-SCNC: 106 MMOL/L — SIGNIFICANT CHANGE UP (ref 96–108)
CO2 SERPL-SCNC: 21 MMOL/L — LOW (ref 22–31)
CREAT SERPL-MCNC: 1.88 MG/DL — HIGH (ref 0.5–1.3)
EGFR: 42 ML/MIN/1.73M2 — LOW
EOSINOPHIL # BLD AUTO: 0 K/UL — SIGNIFICANT CHANGE UP (ref 0–0.5)
EOSINOPHIL NFR BLD AUTO: 0 % — SIGNIFICANT CHANGE UP (ref 0–6)
FIBRINOGEN PPP-MCNC: 243 MG/DL — SIGNIFICANT CHANGE UP (ref 200–445)
GLUCOSE BLDC GLUCOMTR-MCNC: 112 MG/DL — HIGH (ref 70–99)
GLUCOSE BLDC GLUCOMTR-MCNC: 123 MG/DL — HIGH (ref 70–99)
GLUCOSE BLDC GLUCOMTR-MCNC: 141 MG/DL — HIGH (ref 70–99)
GLUCOSE BLDC GLUCOMTR-MCNC: 188 MG/DL — HIGH (ref 70–99)
GLUCOSE BLDC GLUCOMTR-MCNC: 198 MG/DL — HIGH (ref 70–99)
GLUCOSE SERPL-MCNC: 116 MG/DL — HIGH (ref 70–99)
HCT VFR BLD CALC: 26.6 % — LOW (ref 39–50)
HGB BLD-MCNC: 8.5 G/DL — LOW (ref 13–17)
IMM GRANULOCYTES NFR BLD AUTO: 6.4 % — HIGH (ref 0–0.9)
INR BLD: 0.96 RATIO — SIGNIFICANT CHANGE UP (ref 0.85–1.18)
LYMPHOCYTES # BLD AUTO: 0.18 K/UL — LOW (ref 1–3.3)
LYMPHOCYTES # BLD AUTO: 3.9 % — LOW (ref 13–44)
MAGNESIUM SERPL-MCNC: 1.9 MG/DL — SIGNIFICANT CHANGE UP (ref 1.6–2.6)
MCHC RBC-ENTMCNC: 30.8 PG — SIGNIFICANT CHANGE UP (ref 27–34)
MCHC RBC-ENTMCNC: 32 GM/DL — SIGNIFICANT CHANGE UP (ref 32–36)
MCV RBC AUTO: 96.4 FL — SIGNIFICANT CHANGE UP (ref 80–100)
MONOCYTES # BLD AUTO: 0.5 K/UL — SIGNIFICANT CHANGE UP (ref 0–0.9)
MONOCYTES NFR BLD AUTO: 10.7 % — SIGNIFICANT CHANGE UP (ref 2–14)
NEUTROPHILS # BLD AUTO: 3.66 K/UL — SIGNIFICANT CHANGE UP (ref 1.8–7.4)
NEUTROPHILS NFR BLD AUTO: 78.6 % — HIGH (ref 43–77)
NRBC # BLD: 0 /100 WBCS — SIGNIFICANT CHANGE UP (ref 0–0)
PHOSPHATE SERPL-MCNC: 1.9 MG/DL — LOW (ref 2.5–4.5)
PLATELET # BLD AUTO: 179 K/UL — SIGNIFICANT CHANGE UP (ref 150–400)
POTASSIUM SERPL-MCNC: 3.9 MMOL/L — SIGNIFICANT CHANGE UP (ref 3.5–5.3)
POTASSIUM SERPL-SCNC: 3.9 MMOL/L — SIGNIFICANT CHANGE UP (ref 3.5–5.3)
PROTHROM AB SERPL-ACNC: 10.6 SEC — SIGNIFICANT CHANGE UP (ref 9.5–13)
RBC # BLD: 2.76 M/UL — LOW (ref 4.2–5.8)
RBC # FLD: 18.8 % — HIGH (ref 10.3–14.5)
SODIUM SERPL-SCNC: 139 MMOL/L — SIGNIFICANT CHANGE UP (ref 135–145)
TACROLIMUS SERPL-MCNC: 10.4 NG/ML — SIGNIFICANT CHANGE UP
WBC # BLD: 4.66 K/UL — SIGNIFICANT CHANGE UP (ref 3.8–10.5)
WBC # FLD AUTO: 4.66 K/UL — SIGNIFICANT CHANGE UP (ref 3.8–10.5)

## 2023-08-18 PROCEDURE — 99232 SBSQ HOSP IP/OBS MODERATE 35: CPT | Mod: GC

## 2023-08-18 PROCEDURE — 36514 APHERESIS PLASMA: CPT

## 2023-08-18 RX ORDER — DIPHENHYDRAMINE HCL 50 MG
50 CAPSULE ORAL ONCE
Refills: 0 | Status: COMPLETED | OUTPATIENT
Start: 2023-08-18 | End: 2023-08-18

## 2023-08-18 RX ORDER — ACETAMINOPHEN 500 MG
650 TABLET ORAL ONCE
Refills: 0 | Status: COMPLETED | OUTPATIENT
Start: 2023-08-18 | End: 2023-08-18

## 2023-08-18 RX ORDER — ERYTHROPOIETIN 10000 [IU]/ML
10000 INJECTION, SOLUTION INTRAVENOUS; SUBCUTANEOUS ONCE
Refills: 0 | Status: COMPLETED | OUTPATIENT
Start: 2023-08-18 | End: 2023-08-18

## 2023-08-18 RX ORDER — RITUXIMAB 10 MG/ML
800 INJECTION, SOLUTION INTRAVENOUS ONCE
Refills: 0 | Status: DISCONTINUED | OUTPATIENT
Start: 2023-08-18 | End: 2023-08-18

## 2023-08-18 RX ORDER — RITUXIMAB 10 MG/ML
800 INJECTION, SOLUTION INTRAVENOUS ONCE
Refills: 0 | Status: COMPLETED | OUTPATIENT
Start: 2023-08-18 | End: 2023-08-18

## 2023-08-18 RX ORDER — INSULIN GLARGINE 100 [IU]/ML
10 INJECTION, SOLUTION SUBCUTANEOUS AT BEDTIME
Refills: 0 | Status: DISCONTINUED | OUTPATIENT
Start: 2023-08-18 | End: 2023-08-20

## 2023-08-18 RX ADMIN — Medication 81 MILLIGRAM(S): at 12:47

## 2023-08-18 RX ADMIN — Medication 500000 UNIT(S): at 00:06

## 2023-08-18 RX ADMIN — Medication 500000 UNIT(S): at 21:47

## 2023-08-18 RX ADMIN — MYCOPHENOLATE MOFETIL 1000 MILLIGRAM(S): 250 CAPSULE ORAL at 09:07

## 2023-08-18 RX ADMIN — CARVEDILOL PHOSPHATE 3.12 MILLIGRAM(S): 80 CAPSULE, EXTENDED RELEASE ORAL at 18:07

## 2023-08-18 RX ADMIN — Medication 10 UNIT(S): at 09:06

## 2023-08-18 RX ADMIN — Medication 500000 UNIT(S): at 18:07

## 2023-08-18 RX ADMIN — Medication 10 UNIT(S): at 18:11

## 2023-08-18 RX ADMIN — SENNA PLUS 2 TABLET(S): 8.6 TABLET ORAL at 21:45

## 2023-08-18 RX ADMIN — Medication 2: at 12:47

## 2023-08-18 RX ADMIN — Medication 650 MILLIGRAM(S): at 12:47

## 2023-08-18 RX ADMIN — RITUXIMAB 200 MILLIGRAM(S): 10 INJECTION, SOLUTION INTRAVENOUS at 13:40

## 2023-08-18 RX ADMIN — ATORVASTATIN CALCIUM 40 MILLIGRAM(S): 80 TABLET, FILM COATED ORAL at 21:46

## 2023-08-18 RX ADMIN — Medication 50 MILLIGRAM(S): at 12:46

## 2023-08-18 RX ADMIN — Medication 500000 UNIT(S): at 05:16

## 2023-08-18 RX ADMIN — Medication 500000 UNIT(S): at 12:47

## 2023-08-18 RX ADMIN — Medication 2: at 09:06

## 2023-08-18 RX ADMIN — CARVEDILOL PHOSPHATE 3.12 MILLIGRAM(S): 80 CAPSULE, EXTENDED RELEASE ORAL at 05:18

## 2023-08-18 RX ADMIN — Medication 1 TABLET(S): at 12:47

## 2023-08-18 RX ADMIN — TACROLIMUS 6 MILLIGRAM(S): 5 CAPSULE ORAL at 09:07

## 2023-08-18 RX ADMIN — MYCOPHENOLATE MOFETIL 1000 MILLIGRAM(S): 250 CAPSULE ORAL at 20:25

## 2023-08-18 RX ADMIN — FAMOTIDINE 20 MILLIGRAM(S): 10 INJECTION INTRAVENOUS at 12:47

## 2023-08-18 RX ADMIN — INSULIN GLARGINE 10 UNIT(S): 100 INJECTION, SOLUTION SUBCUTANEOUS at 22:12

## 2023-08-18 RX ADMIN — Medication 10 UNIT(S): at 12:48

## 2023-08-18 RX ADMIN — Medication 5 MILLIGRAM(S): at 21:45

## 2023-08-18 RX ADMIN — ERYTHROPOIETIN 10000 UNIT(S): 10000 INJECTION, SOLUTION INTRAVENOUS; SUBCUTANEOUS at 12:47

## 2023-08-18 RX ADMIN — Medication 20 MILLIGRAM(S): at 05:16

## 2023-08-18 RX ADMIN — VALGANCICLOVIR 450 MILLIGRAM(S): 450 TABLET, FILM COATED ORAL at 09:07

## 2023-08-18 NOTE — PROVIDER CONTACT NOTE (OTHER) - ASSESSMENT
patients left arm swelling has increased drastically since PLEX. PLEX RN placed a dressing that seemed to be too tight and dressing was removed by PCA. Patient has full sensation in extremity, denied pain or discomfort, is able to perform ROM, and shows no s/s of discoloration or excessive heat in affected extremity; however, extremity increased from +1-2 swelling to +4. Patient described to nurse that patient has confronted this situation outpatient before and on last admission and states "they flushed it out and it went away." Denies any s/s related to transfusion reaction.
patient started on rituxan, wanted to check with provider about patients status and infusion policy. Performed 15 minute vital check after starting infusion. Patients initial BP was 136/72 and policy indicates provider notification and pause for systolic drop or increase of 20mmHg during infusion. Currently patient is tolerating medication with no s/s of side effects or adverse effects, however, current BP is 110/64. Primary RN wanted to assure with team that it is safe to continue medication.
Pt HR was 124 in bathroom. Repeat VS: /70, , Temp 98.4F, 100% on room air.
vs as charted, no s/s of distress. pt verbalizes that he feels burning when the infusion starts. Pt was immediately disconnected and IV was flushes. No discomfort when IV is flushed with normal saline

## 2023-08-18 NOTE — PROGRESS NOTE ADULT - ASSESSMENT
52 y/o M with past medical history significant for HTN, CHF (s/p AICD 2016), CAD (s/p CABG 2015), IDDM, PVD s/p stent x4 in RLE with R big toe/second toe amputation (2021) and ESRD on HD via LUE AVF for 6 years now s/p DDRT 7/16/2023 with Thymoglobulin induction.   Post-transplant course complicated by severe constipation requiring disimpaction, DGF requiring hemodialysis and LUE swelling with concern for L subclavian thrombus, now s/p IR fistulogram 7/7/23 with balloon angioplasty of subclavian vein.   discharged home on 7/27/23 and remained on HD MWF   He presents from home via EMS to [facility] on 8/7/23 with abdominal discomfort and no bowel movement for 3 days.     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

## 2023-08-18 NOTE — PROGRESS NOTE ADULT - PROBLEM SELECTOR PROBLEM 3
Acute renal transplant rejection
Acute renal transplant rejection
Anemia
Anemia
DISPLAY PLAN FREE TEXT
Anemia
DISPLAY PLAN FREE TEXT

## 2023-08-18 NOTE — PROVIDER CONTACT NOTE (OTHER) - REASON
Pt c/o dizziness, lightheadedness after straining to have BM in bathroom
burning when ancef is infusing
patient started on rituxan, wanted to check with provider about patients status and infusion policy.
patients left arm swelling has increased drastically since PLEX

## 2023-08-18 NOTE — PROGRESS NOTE ADULT - NS ATTEND AMEND GEN_ALL_CORE FT
Jackelyn ctoto
s/p DDRT 7/16/23 with DGF, admitted with abdominal pain and UTI and found to have AMR  f/u cultures, cont Abx as per ID    +DSA with MFI 5000 at HLA DQB. s/p kidney biopsy prelim report showing possible AMR, no ACR  Started on IV solumedrol taper and plasmapheresis/IVIg  Making significantly more urine and Cr starting to decrease  Immunosuppression - Increase to Envarsus 7mg daily, Cont Solumedrol taper, Cellcept 1gm bid.   Will check tacrolimus level and adjust dose accordingly for level ~8-10  Received Thymoglobulin post-op  plan for second session Plex/IVIg today and 3rd on Monday. then will repeat DSA  Transplant Prophylaxis with nystatin, bactrim, valcyte  GI prophylaxis due to steroids
AMR --transplant follow up
as above
s/p DDRT 7/16/23 with DGF, admitted with abdominal pain and UTI  f/u cultures, cont Abx    +DSA with MFI 5000 at HLA DQB. s/p kidney biopsy yesterday. prelim report showing possible AMR, no ACR  Started on IV solumedrol taper yesterday  subjectively making more urine  Cont current immunosuppression with Envarsus 4mg daily, Solumedrol taper, Cellcept 1gm bid.   Will check tacrolimus level and adjust dose accordingly for level ~8-10  Received Thymoglobulin post-op  Plan to start plasmapheresis tomorrow with IVIg due to suspected AMR  Transplant Prophylaxis with nystatin, bactrim, valcyte  GI prophylaxis due to steroids
as above
as above
s/p DDRT 7/16/23 with DGF, admitted with abdominal pain and UTI  f/u cultures, cont Abx as per ID    +DSA with MFI 5000 at HLA DQB. s/p kidney biopsy prelim report showing possible AMR, no ACR  Started on IV solumedrol taper. Due to receive plasmapheresis and IVIg today  Making significantly more urine  Immunosuppression - Increase to Envarsus 5mg daily, Cont Solumedrol taper, Cellcept 1gm bid.   Will check tacrolimus level and adjust dose accordingly for level ~8-10  Received Thymoglobulin post-op  Transplant Prophylaxis with nystatin, bactrim, valcyte  GI prophylaxis due to steroids
s/p DDRT 7/16/23 with DGF, admitted with abdominal pain and UTI and found to have AMR  f/u cultures, cont Abx as per ID    +DSA with MFI 5000 at HLA DQB. s/p kidney biopsy prelim report showing possible AMR, no ACR  Started on IV solumedrol taper and plasmapheresis/IVIg  Making significantly more urine and Cr starting to decrease, currently 2.3  Immunosuppression - Envarsus 7mg daily, Cont Solumedrol taper, Cellcept 1gm bid.   Will check tacrolimus level and adjust dose accordingly for level ~8-10  Received Thymoglobulin post-op  plan for 3rd session of plasmapheresis/IVIg Monday. Will tentatively plan to give 5 sessions  Transplant Prophylaxis with nystatin, bactrim, valcyte  GI prophylaxis due to steroids
s/p DDRT 7/16/23 with DGF, admitted with abdominal pain and UTI  f/u cultures  cont Abx  bowel regimen for constipation, which has resolved  +DSA with MFI 5000 at HLA DQB. Awaiting kidney biopsy today. Started on solumedrol taper  Cont current immunosuppression with Envarsus 8mg daily, Solumedrol taper, Cellcept 1gm bid.   Will check tacrolimus level and adjust dose accordingly for level ~8-10  Received Thymoglobulin post-op  Transplant Prophylaxis with nystatin, bactrim, valcyte  GI prophylaxis due to steroids  IR for biopsy of transplant kidney due to DGF today
s/p kidney transplant-continue current immunosuppression, HD PRN

## 2023-08-18 NOTE — PROGRESS NOTE ADULT - PROBLEM SELECTOR PROBLEM 2
Immunosuppression

## 2023-08-18 NOTE — PROVIDER CONTACT NOTE (OTHER) - SITUATION
patient started on rituxan, wanted to check with provider about patients status and infusion policy.
Pt c/o dizziness, lightheadedness after straining to have BM in bathroom
Pt states that he feels "burning up his arm" when the ancef is infusing
patients left arm swelling has increased drastically since PLEX

## 2023-08-18 NOTE — PROVIDER CONTACT NOTE (OTHER) - RECOMMENDATIONS
Patient is tolerating infusion but does provider want infusion to be paused due to swelling? Elevated extremity, dressing from PLEX was removed.
notify provider
Is it okay to continue to medication?

## 2023-08-18 NOTE — PROGRESS NOTE ADULT - ATTENDING COMMENTS
s/p DDRT 7/16/23 with DGF, admitted with abdominal pain and UTI  f/u cultures  Abx  bowel regimen for constipation  +DSA with MFI 5000 at HLA DQB. Awaiting kidney biopsy tomorrow. Will start solumedrol taper  Cont current immunosuppression with Envarsus 8mg daily, Solumedrol taper, Cellcept 1gm bid.   Will check tacrolimus level and adjust dose accordingly for level ~8-10  Received Thymoglobulin post-op  Transplant Prophylaxis with nystatin, bactrim, valcyte  GI prophylaxis due to steroids  IR for biopsy of transplant kidney due to DGF tomorrow.
Pt feels ok  GIN due to AMR - 2 DQ antibodies of 5000 MFI  Next PLEX session friday (#5).  After completion will give 1 dose rituximab.  Renal function stable.  Transfuse 1 unit for anemia
Pt feels ok  GIN due to AMR - 2 DQ antibodies of 5000 MFI  Next PLEX session today (#$) then friday.  After completion will give 1 dose rituximab.  Renal function stable.
Pt feels ok  GIN due to AMR - 2 DQ antibodies of 5000 MFI - improved to 1000  Next PLEX session today and rituximab.    Renal function stable.
Pt feels ok  GIN due to AMR - 2 DQ antibodies of 5000 MFI  Next PLEX session tomorrow then friday.  After completion will give 1 dose rituximab.

## 2023-08-18 NOTE — PROVIDER CONTACT NOTE (MEDICATION) - ACTION/TREATMENT ORDERED:
Will follow up with pharmacologist Jorge Alberto and follow up if any interventions are needed at this time. Continue close monitoring and as patient to stay still if possible during remainder of infusion.
ok to give carvedilol dosing now as ordered.

## 2023-08-18 NOTE — PROGRESS NOTE ADULT - PROBLEM SELECTOR PROBLEM 4
Hyperphosphatemia
Elevated serum creatinine
Elevated serum creatinine
Hyperphosphatemia

## 2023-08-18 NOTE — PROGRESS NOTE ADULT - SUBJECTIVE AND OBJECTIVE BOX
[facility] – [place] NEPHROLOGY PRACTICE   MD DANIEL WALDEN MD BRIANA PETITO, NP    TEL:  FROM 9 AM to 5 PM ---OFFICE: [phone]  FROM 5 PM - 9 AM PLEASE CALL ANSWERING SERVICE: [phone]     RENAL PROGRESS NOTE: DATE OF SERVICE 08-18-23 @ 12:39  Patient is a 53y old  Male who presents with a chief complaint of UTI, fecal impaction   Patient seen and examined at bedside. No chest pain/sob    VITALS:  T(F): 98.5 (08-18-23 @ 09:00), Max: 98.5 (08-18-23 @ 09:00)  HR: 81 (08-18-23 @ 09:00)  BP: 131/72 (08-18-23 @ 09:00)  RR: 18 (08-18-23 @ 09:00)  SpO2: 100% (08-18-23 @ 09:00)  Wt(kg): --    08-17 @ 07:01  -  08-18 @ 07:00  --------------------------------------------------------  IN: 1830 mL / OUT: 880 mL / NET: 950 mL      PHYSICAL EXAM:  Constitutional: NAD  Neck: No JVD  Respiratory: CTAB, no wheezes, rales or rhonchi  Cardiovascular: S1, S2, RRR  Gastrointestinal: BS+, soft, NT/ND  Extremities: No peripheral edema    Hospital Medications:   MEDICATIONS  (STANDING):  acetaminophen     Tablet .. 650 milliGRAM(s) Oral once  aspirin enteric coated 81 milliGRAM(s) Oral daily  atorvastatin 40 milliGRAM(s) Oral at bedtime  bisacodyl 5 milliGRAM(s) Oral at bedtime  carvedilol 3.125 milliGRAM(s) Oral every 12 hours  dextrose 5%. 1000 milliLiter(s) (50 mL/Hr) IV Continuous <Continuous>  dextrose 5%. 1000 milliLiter(s) (100 mL/Hr) IV Continuous <Continuous>  dextrose 50% Injectable 12.5 Gram(s) IV Push once  dextrose 50% Injectable 25 Gram(s) IV Push once  dextrose 50% Injectable 25 Gram(s) IV Push once  diphenhydrAMINE 50 milliGRAM(s) Oral once  epoetin arian (PROCRIT) Injectable 69612 Unit(s) SubCutaneous once  famotidine    Tablet 20 milliGRAM(s) Oral daily  glucagon  Injectable 1 milliGRAM(s) IntraMuscular once  insulin glargine Injectable (LANTUS) 18 Unit(s) SubCutaneous at bedtime  insulin lispro (ADMELOG) corrective regimen sliding scale   SubCutaneous three times a day before meals  insulin lispro (ADMELOG) corrective regimen sliding scale   SubCutaneous at bedtime  insulin lispro Injectable (ADMELOG) 10 Unit(s) SubCutaneous three times a day before meals  mycophenolate mofetil 1000 milliGRAM(s) Oral two times a day  nystatin    Suspension 501431 Unit(s) Oral four times a day  predniSONE   Tablet 20 milliGRAM(s) Oral daily  riTUXimab-pvvr (RUXIENCE) IVPB (Non - oncologic) 800 milliGRAM(s) IV Intermittent once  senna 2 Tablet(s) Oral at bedtime  tacrolimus ER Tablet (ENVARSUS XR) 6 milliGRAM(s) Oral <User Schedule>  trimethoprim   80 mG/sulfamethoxazole 400 mG 1 Tablet(s) Oral daily  valGANciclovir 450 milliGRAM(s) Oral <User Schedule>    Tacrolimus (), Serum: 10.4 ng/mL (08-18 @ 05:41)    LABS:  08-18    139  |  106  |  38<H>  ----------------------------<  116<H>  3.9   |  21<L>  |  1.88<H>    Ca    9.2      18 Aug 2023 05:41  Phos  1.9     08-18  Mg     1.9     08-18      Creatinine Trend: 1.88 <--, 2.10 <--, 2.12 <--, 2.09 <--, 2.30 <--, 2.35 <--, 2.54 <--    Phosphorus: 1.9 mg/dL (08-18 @ 05:41)                              8.5    4.66  )-----------( 179      ( 18 Aug 2023 05:41 )             26.6     Urine Studies:  Urinalysis - [08-18-23 @ 05:41]      Color  / Appearance  / SG  / pH       Gluc 116 / Ketone   / Bili  / Urobili        Blood  / Protein  / Leuk Est  / Nitrite       RBC  / WBC  / Hyaline  / Gran  / Sq Epi  / Non Sq Epi  / Bacteria       Iron 68, TIBC 185, %sat 37      [08-16-23 @ 06:39]  Ferritin 1010      [08-16-23 @ 06:39]    HBsAb Reactive      [08-17-23 @ 11:28]  HBsAg Nonreact      [08-17-23 @ 11:28]      RADIOLOGY & ADDITIONAL STUDIES:   [facility] [place]   MD DANIEL WALDEN MD BRIANA PETITO, NP    TEL:  FROM 9 AM to 5 PM ---OFFICE: [phone]  FROM 5 PM - 9 AM PLEASE CALL ANSWERING SERVICE: [phone]     RENAL PROGRESS NOTE: DATE OF SERVICE 08-18-23 @ 12:39  Patient is a 53y old  Male who presents with a chief complaint of UTI, fecal impaction   Patient seen and examined at bedside. No chest pain/sob    VITALS:  T(F): 98.5 (08-18-23 @ 09:00), Max: 98.5 (08-18-23 @ 09:00)  HR: 81 (08-18-23 @ 09:00)  BP: 131/72 (08-18-23 @ 09:00)  RR: 18 (08-18-23 @ 09:00)  SpO2: 100% (08-18-23 @ 09:00)  Wt(kg): --    08-17 @ 07:01  -  08-18 @ 07:00  --------------------------------------------------------  IN: 1830 mL / OUT: 880 mL / NET: 950 mL      PHYSICAL EXAM:  Constitutional: NAD  Neck: No JVD  Respiratory: CTAB, no wheezes, rales or rhonchi  Cardiovascular: S1, S2, RRR  Gastrointestinal: BS+, soft, NT/ND  Extremities:  LUE edema  Access: LAVF -- palpable thrill, bruit auscultated     Hospital Medications:   MEDICATIONS  (STANDING):  acetaminophen     Tablet .. 650 milliGRAM(s) Oral once  aspirin enteric coated 81 milliGRAM(s) Oral daily  atorvastatin 40 milliGRAM(s) Oral at bedtime  bisacodyl 5 milliGRAM(s) Oral at bedtime  carvedilol 3.125 milliGRAM(s) Oral every 12 hours  dextrose 5%. 1000 milliLiter(s) (50 mL/Hr) IV Continuous <Continuous>  dextrose 5%. 1000 milliLiter(s) (100 mL/Hr) IV Continuous <Continuous>  dextrose 50% Injectable 12.5 Gram(s) IV Push once  dextrose 50% Injectable 25 Gram(s) IV Push once  dextrose 50% Injectable 25 Gram(s) IV Push once  diphenhydrAMINE 50 milliGRAM(s) Oral once  epoetin arian (PROCRIT) Injectable 90263 Unit(s) SubCutaneous once  famotidine    Tablet 20 milliGRAM(s) Oral daily  glucagon  Injectable 1 milliGRAM(s) IntraMuscular once  insulin glargine Injectable (LANTUS) 18 Unit(s) SubCutaneous at bedtime  insulin lispro (ADMELOG) corrective regimen sliding scale   SubCutaneous three times a day before meals  insulin lispro (ADMELOG) corrective regimen sliding scale   SubCutaneous at bedtime  insulin lispro Injectable (ADMELOG) 10 Unit(s) SubCutaneous three times a day before meals  mycophenolate mofetil 1000 milliGRAM(s) Oral two times a day  nystatin    Suspension 237515 Unit(s) Oral four times a day  predniSONE   Tablet 20 milliGRAM(s) Oral daily  riTUXimab-pvvr (RUXIENCE) IVPB (Non - oncologic) 800 milliGRAM(s) IV Intermittent once  senna 2 Tablet(s) Oral at bedtime  tacrolimus ER Tablet (ENVARSUS XR) 6 milliGRAM(s) Oral <User Schedule>  trimethoprim   80 mG/sulfamethoxazole 400 mG 1 Tablet(s) Oral daily  valGANciclovir 450 milliGRAM(s) Oral <User Schedule>    Tacrolimus (), Serum: 10.4 ng/mL (08-18 @ 05:41)    LABS:  08-18    139  |  106  |  38<H>  ----------------------------<  116<H>  3.9   |  21<L>  |  1.88<H>    Ca    9.2      18 Aug 2023 05:41  Phos  1.9     08-18  Mg     1.9     08-18      Creatinine Trend: 1.88 <--, 2.10 <--, 2.12 <--, 2.09 <--, 2.30 <--, 2.35 <--, 2.54 <--    Phosphorus: 1.9 mg/dL (08-18 @ 05:41)                              8.5    4.66  )-----------( 179      ( 18 Aug 2023 05:41 )             26.6     Urine Studies:  Urinalysis - [08-18-23 @ 05:41]      Color  / Appearance  / SG  / pH       Gluc 116 / Ketone   / Bili  / Urobili        Blood  / Protein  / Leuk Est  / Nitrite       RBC  / WBC  / Hyaline  / Gran  / Sq Epi  / Non Sq Epi  / Bacteria       Iron 68, TIBC 185, %sat 37      [08-16-23 @ 06:39]  Ferritin 1010      [08-16-23 @ 06:39]    HBsAb Reactive      [08-17-23 @ 11:28]  HBsAg Nonreact      [08-17-23 @ 11:28]      RADIOLOGY & ADDITIONAL STUDIES:

## 2023-08-18 NOTE — PROVIDER CONTACT NOTE (MEDICATION) - ASSESSMENT
patient due for low dose carvedilol, is team okay with administering as ordered? patient has tolerated infusion well and is about to finish dosing. Does team want carvedilol pushed back for safe closer monitoring post transfusion or does team want administration as ordered? Vitals stable.
patient was disconnected from rituxan infusion for a few minutes, unwitnessed disconnection but caught by primary RN. Patient has been receiving infusion per protocol and IV pump was alarming that there was an air bubble in the line. Upon assessing IV line primary RN noted that the patients line had come undone from the IV lock and was not infusing for a few minutes and leaking onto floor. It was caught MCC after starting the 30 minute dosing increase. Puddle on floor was minimal. Patient has been turning and boosting himself in bed during infusion frequently by laying bed flat and pulling himself up using headboard. Instructed patient to please remain in a stable position for the remainder of infusion as he may have accidentally undone the IV without realizing and call for help if he needs boosting. Patient is still tolerating infusion well and vitals have been stable.

## 2023-08-18 NOTE — PROGRESS NOTE ADULT - PROBLEM SELECTOR PROBLEM 1
Kidney transplanted
Transplanted kidney
Kidney transplanted
Transplanted kidney

## 2023-08-18 NOTE — PROGRESS NOTE ADULT - NS ATTEND BILL GEN_ALL_CORE
Ongoing SW/CM Assessment/Plan of Care Note     Patient/Family discharge goal (s):             PT Recommendation:  Recommendation for Discharge: PT WI: Assisted living, Home therapy    OT Recommendation:  Recommendations for Discharge: OT WI: Assisted living    SLP Recommendation:       Disposition:       Progress note:     SHANI was made aware pt is medically ready to return to her AL today.    SHANI called pt's AL, Rachael, Hope and Norma ([phone]) and spoke to Christine.    Informed Christine that per MD, pt is medically ready for discharge. Christine asked that trevor pt return, she bring a copy of her discharge summary and AVS.    Rachael Matos and Norma uses [facility]. All new and changed prescriptions need to be faxed to [facility] at [phone] (phone: [phone].)    Pt uses transportation through [facility]. Writer called and left a message for CC worker Italia Rojas ([phone]) with request for call back to arrange discharge transportation. Awaiting call back.    SHANI following.    ADD: TRAVIS arranged discharge ambulance for pt as CC did not call back with other transportation options.    SHANI faxed discharge summary and AVS to [facility] at fax # [phone].    Discharge plan is appropriate to meet pt's needs.    Case closed to SHANI.        
Attending to bill

## 2023-08-18 NOTE — PROGRESS NOTE ADULT - ASSESSMENT
54 yo M with h/o ESRD on HD x6 years via LUE AVF (outpatient nephrologist: Dr. Shaka Sanchez), underwent DDR on 7/16/23, DM2 CAD, CHF, underwent CABG 2015, AICD (2016), and severe PVD (underwent R big toe and second toe amputation in 2021) here for abdominal pain and constipation also with DGF requiring iHD, renal biopsy done 8/9/23 showing AMR. Treated with PLEX, IVIG, last PLEX session on8/8 with rituximab.

## 2023-08-18 NOTE — PROVIDER CONTACT NOTE (OTHER) - BACKGROUND
s/p DDRT 7/16/2023 , DGF requiring hemodialysis and LUE swelling with concern for L subclavian thrombus, He presents from home via  with abdominal discomfort and no bowel movement for 3 days
patient readmitted for rejection s/p DDRT
patient readmitted for kidney rejection receiving rituxan IV infusion
Pt admitted with abdominal discomfort and no BM for 3 days. S/p DDRT 7/16/2023

## 2023-08-18 NOTE — PROGRESS NOTE ADULT - SUBJECTIVE AND OBJECTIVE BOX
[facility] DIVISION OF KIDNEY DISEASES AND HYPERTENSION -- FOLLOW UP NOTE  --------------------------------------------------------------------------------  Chief Complaint: history of DDRT on 7/16/23    HPI:   52 yo M with h/o ESRD on HD x6 years via LUE AVF (outpatient nephrologist: Dr. Shaka Sanchez), underwent DDRT on 7/16/23, DM2 CAD, CHF, underwent CABG 2015, AICD (2016), and severe PVD (underwent R big toe and second toe amputation in 2021) here for abdominal pain and constipation. Patient had a DGF post op and is still continuing to require iHD. Goes to [facility]. Last iHD was Friday, Aug 4th. Patient was planned to undergo a renal biopsy this week but had to be brought in by EMS for abdominal discomfort and constipation. Patient also with reduced intake of PO. Still makes urine. s/p renal biopsy on 8/9 showing AMR and diabetic nephrosclerosis (g1, PTC0-1, C4d 0). Total of 5 PLEX sessions, 8/11, 8/12,8/14.     24 hour events/subjective:  Planned for PLEX today then Rituxan Denies any headaches, fevers/chills, chest pain, palpitations, SOB, and leg swelling.    PAST HISTORY  --------------------------------------------------------------------------------  No significant changes to PMH, PSH, FHx, SHx, unless otherwise noted    ALLERGIES & MEDICATIONS  --------------------------------------------------------------------------------  Allergies    Contrast. (Unknown)    Intolerances      Standing Inpatient Medications  acetaminophen     Tablet .. 650 milliGRAM(s) Oral once  aspirin enteric coated 81 milliGRAM(s) Oral daily  atorvastatin 40 milliGRAM(s) Oral at bedtime  bisacodyl 5 milliGRAM(s) Oral at bedtime  carvedilol 3.125 milliGRAM(s) Oral every 12 hours  dextrose 5%. 1000 milliLiter(s) IV Continuous <Continuous>  dextrose 5%. 1000 milliLiter(s) IV Continuous <Continuous>  dextrose 50% Injectable 12.5 Gram(s) IV Push once  dextrose 50% Injectable 25 Gram(s) IV Push once  dextrose 50% Injectable 25 Gram(s) IV Push once  diphenhydrAMINE 50 milliGRAM(s) Oral once  epoetin arian (PROCRIT) Injectable 43883 Unit(s) SubCutaneous once  famotidine    Tablet 20 milliGRAM(s) Oral daily  glucagon  Injectable 1 milliGRAM(s) IntraMuscular once  insulin glargine Injectable (LANTUS) 18 Unit(s) SubCutaneous at bedtime  insulin lispro (ADMELOG) corrective regimen sliding scale   SubCutaneous three times a day before meals  insulin lispro (ADMELOG) corrective regimen sliding scale   SubCutaneous at bedtime  insulin lispro Injectable (ADMELOG) 10 Unit(s) SubCutaneous three times a day before meals  mycophenolate mofetil 1000 milliGRAM(s) Oral two times a day  nystatin    Suspension 169341 Unit(s) Oral four times a day  predniSONE   Tablet 20 milliGRAM(s) Oral daily  riTUXimab-pvvr (RUXIENCE) IVPB (Non - oncologic) 800 milliGRAM(s) IV Intermittent once  senna 2 Tablet(s) Oral at bedtime  tacrolimus ER Tablet (ENVARSUS XR) 6 milliGRAM(s) Oral <User Schedule>  trimethoprim   80 mG/sulfamethoxazole 400 mG 1 Tablet(s) Oral daily  valGANciclovir 450 milliGRAM(s) Oral <User Schedule>    PRN Inpatient Medications  dextrose Oral Gel 15 Gram(s) Oral once PRN      REVIEW OF SYSTEMS  --------------------------------------------------------------------------------  per above    VITALS/PHYSICAL EXAM  --------------------------------------------------------------------------------  T(C): 36.9 (08-18-23 @ 09:00), Max: 36.9 (08-18-23 @ 05:00)  HR: 81 (08-18-23 @ 09:00) (79 - 87)  BP: 131/72 (08-18-23 @ 09:00) (110/69 - 140/64)  RR: 18 (08-18-23 @ 09:00) (17 - 18)  SpO2: 100% (08-18-23 @ 09:00) (98% - 100%)  Wt(kg): --        08-17-23 @ 07:01  -  08-18-23 @ 07:00  --------------------------------------------------------  IN: 1830 mL / OUT: 880 mL / NET: 950 mL      Physical Exam:  	Gen: NAD  	HEENT: PERRL, supple neck, clear oropharynx  	Pulm: CTA B/L  	CV: RRR, S1S2; no rub  	Back: No spinal or CVA tenderness; no sacral edema  	Abd: +BS, soft, nontender/nondistended                      Transplant: No tenderness, swelling  	: No suprapubic tenderness  	LE: Warm, FROM; no edema  	Neuro: No focal deficits  	Psych: Normal affect and mood  	Skin: Warm, without rashes    LABS/STUDIES  --------------------------------------------------------------------------------              8.5    4.66  >-----------<  179      [08-18-23 @ 05:41]              26.6     139  |  106  |  38  ----------------------------<  116      [08-18-23 @ 05:41]  3.9   |  21  |  1.88        Ca     9.2     [08-18-23 @ 05:41]      iCa    1.32     [08-18 @ 05:41]      Mg     1.9     [08-18-23 @ 05:41]      Phos  1.9     [08-18-23 @ 05:41]      PT/INR: PT 10.6 , INR 0.96       [08-18-23 @ 05:42]  PTT: 25.7       [08-18-23 @ 05:42]      Creatinine Trend:  SCr 1.88 [08-18 @ 05:41]  SCr 2.10 [08-17 @ 07:08]  SCr 2.12 [08-16 @ 06:38]  SCr 2.09 [08-15 @ 06:18]  SCr 2.30 [08-14 @ 06:15]    Tacrolimus (), Serum: 10.4 ng/mL (08-18 @ 05:41)  Tacrolimus (), Serum: 8.6 ng/mL (08-17 @ 07:08)  Tacrolimus (), Serum: 8.3 ng/mL (08-16 @ 06:37)  Tacrolimus (), Serum: 7.8 ng/mL (08-15 @ 06:18)            Urinalysis - [08-18-23 @ 05:41]      Color  / Appearance  / SG  / pH       Gluc 116 / Ketone   / Bili  / Urobili        Blood  / Protein  / Leuk Est  / Nitrite       RBC  / WBC  / Hyaline  / Gran  / Sq Epi  / Non Sq Epi  / Bacteria       Iron 68, TIBC 185, %sat 37      [08-16-23 @ 06:39]  Ferritin 1010      [08-16-23 @ 06:39]    HBsAb Reactive      [08-17-23 @ 11:28]  HBsAg Nonreact      [08-17-23 @ 11:28]

## 2023-08-18 NOTE — PROGRESS NOTE ADULT - PROBLEM SELECTOR PLAN 1
Pt. with h/o ESRD on HD. Underwent DDRT on 7/16/23. Post-transplant course significant for DGF. Still receiving iHD at [facility], last outpatient HD was 8/4/23. HD consent obtained from patient and placed in chart. Last HD done Mon 8/7. Renal biopsy done, showing AMR and diabetic nephrosclerosis (g1, ptc0-1 c4d 0).     Pt. is non-oliguric. Pt. clinically stable. Labs reviewed. Continue with immunosuppression regimen, as outlined below. Monitor labs and urine output. Avoid nephrotoxins. Dose medications as per eGFR. Plan for PLEX on 8/11, 8/12, 8/14 and 8/16. DSA pending. Will plan for Rituximab on today after 5th PLEX

## 2023-08-18 NOTE — PROVIDER CONTACT NOTE (MEDICATION) - SITUATION
patient due for low dose carvedilol, is team okay with administering as ordered?
patient was disconnected from rituxan infusion for a few minutes

## 2023-08-18 NOTE — CHART NOTE - NSCHARTNOTEFT_GEN_A_CORE
54 y/o M with past medical history significant for HTN, CHF (s/p AICD 2016--last interrogated 7/15/23), CAD (s/p CABG 2015), IDDM, PVD s/p stent x4 in RLE with R big toe/second toe amputation (2021) and ESRD on HD via LUE AVF for 6 years now s/p DDRT 7/16/2023. Kidney biopsy on 8/10 concerned for AMR and diabetic nephrosclerosis. BB is contacted to initiate PLEX. A course of 3 PLEX was originally planned (8/11,8/12,8/14). 2 additional sessions were requested.    8/11/23 PLEX #1, 1 plasma volume with half donor plasma and half 5% albumin as replacement fluid. PLEX#1 completed with no issues.    8/12/23 PLEX #2, 1 plasma volume with half donor plasma and half 5% albumin as replacement fluid. PLEX#2 completed with no issues.    8/14/23 PLEX #3  1 plasma volume with half donor plasma and half 5% albumin as replacement fluid. Pt BP dropped to 87s during the procedure but pt asymptomatic. 100ml saline bolus given. BP went up to 110s. Procedure resumed and completed with no other issues.    8/16/23 PLEX #4, 1 plasma volume with half donor plasma and half 5% albumin as replacement fluid. PLEX#4 completed with no issues.    PLEX #5 today. Labs reviewed. PT PTT INR wnl. Fibrinogen 243 mg/dl. 1 plasma volume with 5% albumin as replacement fluid.     Please contact BB if additional procedure indicated.

## 2023-08-18 NOTE — PROGRESS NOTE ADULT - PROBLEM SELECTOR PLAN 3
Pt. with anemia in the setting of ESRD/DGF. Hgb below goal. Iron studies reviewed. Epo 10k given on 8/11. s/p 1 u PRBC on 8/17 and 10k epo today

## 2023-08-18 NOTE — PROGRESS NOTE ADULT - SUBJECTIVE AND OBJECTIVE BOX
Patient is a 53y old  Male who presents with a chief complaint of UTI, fecal impaction (18 Aug 2023 12:38)      DATE OF SERVICE: 08-18-23 @ 13:42    SUBJECTIVE / OVERNIGHT EVENTS: overnight events noted    ROS:  Resp: No cough no sputum production  CVS: No chest pain no palpitations no orthopnea  GI: no N/V/D  : no dysuria, no hematuria  "I feel fine'         MEDICATIONS  (STANDING):  aspirin enteric coated 81 milliGRAM(s) Oral daily  atorvastatin 40 milliGRAM(s) Oral at bedtime  bisacodyl 5 milliGRAM(s) Oral at bedtime  carvedilol 3.125 milliGRAM(s) Oral every 12 hours  dextrose 5%. 1000 milliLiter(s) (50 mL/Hr) IV Continuous <Continuous>  dextrose 5%. 1000 milliLiter(s) (100 mL/Hr) IV Continuous <Continuous>  dextrose 50% Injectable 25 Gram(s) IV Push once  dextrose 50% Injectable 12.5 Gram(s) IV Push once  dextrose 50% Injectable 25 Gram(s) IV Push once  famotidine    Tablet 20 milliGRAM(s) Oral daily  glucagon  Injectable 1 milliGRAM(s) IntraMuscular once  insulin glargine Injectable (LANTUS) 18 Unit(s) SubCutaneous at bedtime  insulin lispro (ADMELOG) corrective regimen sliding scale   SubCutaneous three times a day before meals  insulin lispro (ADMELOG) corrective regimen sliding scale   SubCutaneous at bedtime  insulin lispro Injectable (ADMELOG) 10 Unit(s) SubCutaneous three times a day before meals  mycophenolate mofetil 1000 milliGRAM(s) Oral two times a day  nystatin    Suspension 480216 Unit(s) Oral four times a day  predniSONE   Tablet 20 milliGRAM(s) Oral daily  riTUXimab-pvvr (RUXIENCE) IVPB (Non - oncologic) 800 milliGRAM(s) IV Intermittent once  senna 2 Tablet(s) Oral at bedtime  tacrolimus ER Tablet (ENVARSUS XR) 6 milliGRAM(s) Oral <User Schedule>  trimethoprim   80 mG/sulfamethoxazole 400 mG 1 Tablet(s) Oral daily  valGANciclovir 450 milliGRAM(s) Oral <User Schedule>    MEDICATIONS  (PRN):  dextrose Oral Gel 15 Gram(s) Oral once PRN Blood Glucose LESS THAN 70 milliGRAM(s)/deciliter        CAPILLARY BLOOD GLUCOSE      POCT Blood Glucose.: 198 mg/dL (18 Aug 2023 12:42)  POCT Blood Glucose.: 188 mg/dL (18 Aug 2023 09:00)  POCT Blood Glucose.: 202 mg/dL (17 Aug 2023 21:33)  POCT Blood Glucose.: 278 mg/dL (17 Aug 2023 17:07)    I&O's Summary    17 Aug 2023 07:01  -  18 Aug 2023 07:00  --------------------------------------------------------  IN: 1830 mL / OUT: 880 mL / NET: 950 mL        Vital Signs Last 24 Hrs  T(C): 36.8 (18 Aug 2023 12:57), Max: 36.9 (18 Aug 2023 05:00)  T(F): 98.2 (18 Aug 2023 12:57), Max: 98.5 (18 Aug 2023 09:00)  HR: 87 (18 Aug 2023 12:57) (79 - 87)  BP: 103/61 (18 Aug 2023 12:57) (103/61 - 140/64)  BP(mean): --  RR: 18 (18 Aug 2023 12:57) (17 - 18)  SpO2: 99% (18 Aug 2023 12:57) (98% - 100%)      PHYSICAL EXAM:  EYES: EOMI, PERRLA  CHEST/LUNG: clear   HEART: S1 S2; no murmurs   ABDOMEN: Soft, Nontender  EXTREMITIES:  no edema  NEUROLOGY: AO x 3 non-focal  SKIN: No rashes or lesions    LABS:                        8.5    4.66  )-----------( 179      ( 18 Aug 2023 05:41 )             26.6     08-18    139  |  106  |  38<H>  ----------------------------<  116<H>  3.9   |  21<L>  |  1.88<H>    Ca    9.2      18 Aug 2023 05:41  Phos  1.9     08-18  Mg     1.9     08-18      PT/INR - ( 18 Aug 2023 05:42 )   PT: 10.6 sec;   INR: 0.96 ratio         PTT - ( 18 Aug 2023 05:42 )  PTT:25.7 sec      Urinalysis Basic - ( 18 Aug 2023 05:41 )    Color: x / Appearance: x / SG: x / pH: x  Gluc: 116 mg/dL / Ketone: x  / Bili: x / Urobili: x   Blood: x / Protein: x / Nitrite: x   Leuk Esterase: x / RBC: x / WBC x   Sq Epi: x / Non Sq Epi: x / Bacteria: x          All consultant(s) notes reviewed and care discussed with other providers        Contact Number, Dr Murphy [phone]

## 2023-08-18 NOTE — PROVIDER CONTACT NOTE (OTHER) - ACTION/TREATMENT ORDERED:
patient sounds like he is tolerating medication, continue medication and monitoring per protocol
Continue infusion, will order for duplex
team made aware, pierce REHMAN aware. Nursing care to be continued
provider notified. Will order and perform EKG. continue to monitor

## 2023-08-18 NOTE — PROVIDER CONTACT NOTE (MEDICATION) - RECOMMENDATIONS
Extra secured IV lock and line in place with tape and julius wrap to avoid issue from repeating, instructed patient to remain in place or call for help if boosting is needed. please follow up with any other appropriate interventions at this time.

## 2023-08-18 NOTE — PROGRESS NOTE ADULT - ASSESSMENT
54 yo M with hx of DM2 CAD, CHF, s/p CABG 2015, AICD (2016), on hemodialysis for approximately 6 years via L AVF (nephrologist Dr. Bharath Romo), He has hx of PVD, is s/p 4 stents in R leg (mid and distal right superficial femoral artery, right popliteal x2 on 1/14/22).; is sp RT big toe and second toe amputation 2021, on asa and plavix for severe PVD (per pt). Now here for possible DDRT. Pt reports does not make urine only few drops occasionally. Pt denies N/V/D, fevers/chills, CP, SOB. Pt reports last ate at 4pm today and had HD yesterday 7/14/23.  Nephrology consulted for ESRD s/p DDRT.      s/p DDRT @ [facility] 7/16/2023   Initially received Simulect induction -- Changed to Thymoglobulin given delayed graft function.  Pt discharged on previous admission on HD.   Post transport course significant for DGF  Via L AVF --> s/p Left Radiocephalic fistulogram and subclavian vein angioplasty with Vascular 7/24/2023  Consent obtained from patient and placed in chart.  s/p CT A& P No contrast 8/7/23 --> partially imaged L pleural effusion, unchanged. Partially  imaged LLL opacity again seen.  small amount of dependent air within urinary bladder, small renal cysts, left transplant kidney with mil hydro. Stent extends from kidney to bladder.   s/p Doppler Renal US Transplant kidney 8/7/23 --> patent renal vasculature. Elevated renal artery peak systolic velocities. Concern for possible renal artery stenosis at anastomosis. Mild elevation  of intrarenal resistive indices , grossly unchanged from prior exam.   Continue Immunosuppression per transplant : Envarsus per level ( 6mg) , Cellcept 1 gm bid, and pulse steroids, prednisone held while pt on pulse steroids   Tacro trough is 10.4 on 8/18   s/p Transplant renal biopsy 8/09 --Renal biopsy  showing AMR and diabetic nephrosclerosis (g1, ptc0-1 c4d 0). Plan for total 5 sessions of PLEX, s/p PLEX on  8/11, 8/12. #4 8/16, plan for Rituximab today following 5th PLEX. DSA labs sent 8/16 - pending   Monitor labs and urine output. Avoid nephrotoxins  HD per transplant, last HD 8/7 . Pt off Furosemide.   Dose medications as per eGFR.  Renal Diet   Monitor     Anemia  Mgmt per Transplant   s/p pRBC 8/17  Maintain Hgb > 8  Monitor     HTN:   BP controlled   Monitor     Proteinuria / Hematuria   likely in setting of + LE, bacteria  Urine cx + Klebsiella   On Cefazolin  Mgmt per Transplant ID

## 2023-08-18 NOTE — PROGRESS NOTE ADULT - PROBLEM SELECTOR PLAN 2
On Envarsus per goal level, Cellcept 1 gm BID, and prednisone 5 mg qd. Tacro trough noted 10.4 today. Check tacrolimus trough 30 minutes prior to AM dose, goal: 8-10. Pred held while patient on pulse dose steroids 8/8-

## 2023-08-18 NOTE — PROVIDER CONTACT NOTE (MEDICATION) - BACKGROUND
patient admitted for renal rejection, receiving titrated rituxan dosing following infusion protocol
patient admitted for renal rejection almost complete with rituxan infusion

## 2023-08-18 NOTE — PROVIDER CONTACT NOTE (MEDICATION) - REASON
patient was disconnected from rituxan infusion for a few minutes
patient due for low dose carvedilol, is team okay with administering as ordered?

## 2023-08-18 NOTE — PROGRESS NOTE ADULT - ASSESSMENT
Tee Salvador is a 52 y/o M with past medical history significant for HTN, CHF (s/p AICD 2016--last interrogated 7/15/23), CAD (s/p CABG 2015), IDDM, PVD s/p stent x4 in RLE with R big toe/second toe amputation (2021) and ESRD on HD via LUE AVF for 6 years now s/p DDRT 7/16/2023 with Thymoglobulin induction. Post-transplant course complicated by severe constipation requiring disimpaction, DGF requiring hemodialysis and LUE swelling with concern for L subclavian thrombus, now s/p IR fistulogram 7/7/23 with balloon angioplasty of subclavian vein. He was discharged home on 7/27/23 and remained on HD MWF at home center [facility] He presents from home via EMS to [facility] on 8/7/23 with constipation and continued DGF    [ ] DGF s/p DDRT 7/16/23  -S/P IR biopsy on 8/10 with concern for AMR. will continue PLEX/IVIG (last session today), plan for Rituximab today  -DSA+ : will continue pulse dose steroids with taper (500 on 8/8, 250 on 8/9, 125 on 8/10, 60 on 8/12).  Prednisone 20 mg daily  - DSA labs sent today 8/16.   -graft function improving  -last HD on 8/7, furosemide on Hold.   -UTI: Klebsiella, CTX-->Ancef (total of 7D per ID), last day on 8/16, follow up repeat Ucx: Neg.   -BCx negative  -bowel regimen  -diet as tolerated  -SCDs  -epogen today, stable h/h. continue ASA    [ ] Immunosuppression  -Envarsus per level  -MMF 1/1  -steroids as above  -PPx regimen: Valcyte/Bactrim/Nystatin/PPI with renal dosing    [ ] DM  -Lantus/Lispro adjust while on steroids.     # [id number]  Transplant PA

## 2023-08-18 NOTE — PROGRESS NOTE ADULT - SUBJECTIVE AND OBJECTIVE BOX
Transplant Surgery - Multidisciplinary Progress Note  --------------------------------------------------------------  DDRT 7/16/2023    Present:    Patient seen and examined with multidisciplinary team including Transplant Surgeon: Dr. Delacruz. Transplant Nephrologist: Dr. JONO Corado Transplant Pharmacist: SAEID Plunkett/SHERIE Chris and unit RN during am rounds.  Disciplines not in attendance will be notified of the plan.     HPI: Tee Salvador is a 54 y/o M with past medical history significant for HTN, CHF (s/p AICD 2016--last interrogated 7/15/23), CAD (s/p CABG 2015), IDDM, PVD s/p stent x4 in RLE with R big toe/second toe amputation (2021) and ESRD on HD via LUE AVF for 6 years now s/p DDRT 7/16/2023 with Thymoglobulin induction. Post-transplant course complicated by severe constipation requiring disimpaction, DGF requiring hemodialysis and LUE swelling with concern for L subclavian thrombus, now s/p IR fistulogram 7/7/23 with balloon angioplasty of subclavian vein. He was discharged home on 7/27/23 and remained on HD MWF at home center [facility]. He presents from home via EMS to [facility] on 8/7/23 with constipation and continued DGF.     Interval Events:  - bx with concern for AMR, tolerating PLEX/IVIG/steroids. Last dose PLEX/IVIG today. Rituximab today.  - no complaints at this time  - Denies any Abdominal Pain.  - Have BM, passing gas  - Cr: 1.88  -  VSS, Afebrile.   - Pt denies CP/SOB, fever/chills, N/V.     Immunosuppression:  Envarsus per level  MMF 1/1  pulse steroids with taper    Potential Discharge date: TBD    Education:  Medications    Plan of care:  See Below      MEDICATIONS  (STANDING):  aspirin enteric coated 81 milliGRAM(s) Oral daily  atorvastatin 40 milliGRAM(s) Oral at bedtime  carvedilol 3.125 milliGRAM(s) Oral every 12 hours  dextrose 5%. 1000 milliLiter(s) (100 mL/Hr) IV Continuous <Continuous>  dextrose 5%. 1000 milliLiter(s) (50 mL/Hr) IV Continuous <Continuous>  dextrose 50% Injectable 25 Gram(s) IV Push once  dextrose 50% Injectable 12.5 Gram(s) IV Push once  dextrose 50% Injectable 25 Gram(s) IV Push once  famotidine    Tablet 20 milliGRAM(s) Oral daily  glucagon  Injectable 1 milliGRAM(s) IntraMuscular once  insulin glargine Injectable (LANTUS) 18 Unit(s) SubCutaneous at bedtime  insulin lispro (ADMELOG) corrective regimen sliding scale   SubCutaneous three times a day before meals  insulin lispro (ADMELOG) corrective regimen sliding scale   SubCutaneous at bedtime  insulin lispro Injectable (ADMELOG) 10 Unit(s) SubCutaneous three times a day before meals  mycophenolate mofetil 1000 milliGRAM(s) Oral two times a day  nystatin    Suspension 013491 Unit(s) Oral four times a day  predniSONE   Tablet 20 milliGRAM(s) Oral daily  sodium phosphate 15 milliMole(s)/250 mL IVPB 15 milliMole(s) IV Intermittent once  tacrolimus ER Tablet (ENVARSUS XR) 6 milliGRAM(s) Oral <User Schedule>  trimethoprim   80 mG/sulfamethoxazole 400 mG 1 Tablet(s) Oral daily  valGANciclovir 450 milliGRAM(s) Oral <User Schedule>    MEDICATIONS  (PRN):  dextrose Oral Gel 15 Gram(s) Oral once PRN Blood Glucose LESS THAN 70 milliGRAM(s)/deciliter      PAST MEDICAL & SURGICAL HISTORY:  Diabetes mellitus  type 2      Foot ulcer due to secondary DM      Coronary artery disease      Acute on chronic systolic heart failure      H/O kidney transplant      Breast Reduction  at age 18      Toe amputation status, left      S/P CABG (coronary artery bypass graft)      AICD (automatic cardioverter/defibrillator) present          Vital Signs Last 24 Hrs  Vital Signs Last 24 Hrs  T(C): 36.9 (18 Aug 2023 05:00), Max: 36.9 (18 Aug 2023 05:00)  T(F): 98.4 (18 Aug 2023 05:00), Max: 98.4 (18 Aug 2023 05:00)  HR: 84 (18 Aug 2023 05:00) (79 - 87)  BP: 126/70 (18 Aug 2023 05:00) (110/69 - 140/64)  BP(mean): --  RR: 18 (18 Aug 2023 05:00) (17 - 18)  SpO2: 98% (18 Aug 2023 05:00) (98% - 100%)    Parameters below as of 18 Aug 2023 05:00  Patient On (Oxygen Delivery Method): room air        I&O's Summary    17 Aug 2023 07:01  -  18 Aug 2023 07:00  --------------------------------------------------------  IN: 1830 mL / OUT: 880 mL / NET: 950 mL                                  8.5    4.66  )-----------( 179      ( 18 Aug 2023 05:41 )             26.6   08-18    139  |  106  |  38<H>  ----------------------------<  116<H>  3.9   |  21<L>  |  1.88<H>    Ca    9.2      18 Aug 2023 05:41  Phos  1.9     08-18  Mg     1.9     08-18        Review of systems  Gen: No weight changes, fatigue, fevers/chills, weakness  Skin: No rashes  Head/Eyes/Ears/Mouth: No headache; Normal hearing; Normal vision w/o blurriness; No sinus pain/discomfort, sore throat  Respiratory: No dyspnea, cough, wheezing, hemoptysis  CV: No chest pain, PND, orthopnea  GI: no abdominal pain, denies diarrhea, constipation, nausea, vomiting, melena, hematochezia  : No increased frequency, dysuria, hematuria, nocturia  MSK: No joint pain/swelling; no back pain; no edema  Neuro: No dizziness/lightheadedness, weakness, seizures, numbness, tingling  Heme: No easy bruising or bleeding  Endo: No heat/cold intolerance  Psych: No significant nervousness, anxiety, stress, depression  All other systems were reviewed and are negative, except as noted.     PHYSICAL EXAM:  Constitutional: Well developed / well nourished  Eyes: Anicteric, PERRLA  ENMT: nc/at  Neck: supple  Respiratory: CTA B/L  Cardiovascular: RRR  Gastrointestinal: Soft, ND/NT. RLQ incision with some staples still in place . LIANA serous  Genitourinary: Voiding spontaneously  Extremities: SCD's in place and working bilaterally  Vascular: Palpable dp pulses bilaterally  Neurological: A&O x3  Skin: no rashes, ulcerations or lesions;  Musculoskeletal: Moving all extremities  Psychiatric: Responsive

## 2023-08-18 NOTE — PROGRESS NOTE ADULT - PROBLEM SELECTOR PLAN 4
Phos is below goal.  Replete phos. Encourage PO intake goal: 3.5-5.5    If you have any questions, please feel free to contact me  Shar Schaefer  Nephrology Fellow  [phone]; Prefer [employer] TEAMS  (After 5pm or on weekends please page the on-call fellow).

## 2023-08-19 ENCOUNTER — TRANSCRIPTION ENCOUNTER (OUTPATIENT)
Age: 53
End: 2023-08-19

## 2023-08-19 LAB
ANION GAP SERPL CALC-SCNC: 12 MMOL/L — SIGNIFICANT CHANGE UP (ref 5–17)
APTT BLD: 29.6 SEC — SIGNIFICANT CHANGE UP (ref 24.5–35.6)
BASOPHILS # BLD AUTO: 0 K/UL — SIGNIFICANT CHANGE UP (ref 0–0.2)
BASOPHILS NFR BLD AUTO: 0 % — SIGNIFICANT CHANGE UP (ref 0–2)
BUN SERPL-MCNC: 37 MG/DL — HIGH (ref 7–23)
CA-I BLD-SCNC: 1.32 MMOL/L — SIGNIFICANT CHANGE UP (ref 1.15–1.33)
CALCIUM SERPL-MCNC: 9.3 MG/DL — SIGNIFICANT CHANGE UP (ref 8.4–10.5)
CHLORIDE SERPL-SCNC: 111 MMOL/L — HIGH (ref 96–108)
CO2 SERPL-SCNC: 17 MMOL/L — LOW (ref 22–31)
CREAT SERPL-MCNC: 1.82 MG/DL — HIGH (ref 0.5–1.3)
EGFR: 44 ML/MIN/1.73M2 — LOW
EOSINOPHIL # BLD AUTO: 0 K/UL — SIGNIFICANT CHANGE UP (ref 0–0.5)
EOSINOPHIL NFR BLD AUTO: 0 % — SIGNIFICANT CHANGE UP (ref 0–6)
FIBRINOGEN PPP-MCNC: 144 MG/DL — LOW (ref 200–445)
GLUCOSE BLDC GLUCOMTR-MCNC: 146 MG/DL — HIGH (ref 70–99)
GLUCOSE BLDC GLUCOMTR-MCNC: 152 MG/DL — HIGH (ref 70–99)
GLUCOSE BLDC GLUCOMTR-MCNC: 155 MG/DL — HIGH (ref 70–99)
GLUCOSE BLDC GLUCOMTR-MCNC: 161 MG/DL — HIGH (ref 70–99)
GLUCOSE BLDC GLUCOMTR-MCNC: 174 MG/DL — HIGH (ref 70–99)
GLUCOSE SERPL-MCNC: 139 MG/DL — HIGH (ref 70–99)
HCT VFR BLD CALC: 26.5 % — LOW (ref 39–50)
HGB BLD-MCNC: 8.3 G/DL — LOW (ref 13–17)
INR BLD: 1.16 RATIO — SIGNIFICANT CHANGE UP (ref 0.85–1.18)
LYMPHOCYTES # BLD AUTO: 0 % — LOW (ref 13–44)
LYMPHOCYTES # BLD AUTO: 0 K/UL — LOW (ref 1–3.3)
MAGNESIUM SERPL-MCNC: 1.9 MG/DL — SIGNIFICANT CHANGE UP (ref 1.6–2.6)
MANUAL SMEAR VERIFICATION: SIGNIFICANT CHANGE UP
MCHC RBC-ENTMCNC: 31 PG — SIGNIFICANT CHANGE UP (ref 27–34)
MCHC RBC-ENTMCNC: 31.3 GM/DL — LOW (ref 32–36)
MCV RBC AUTO: 98.9 FL — SIGNIFICANT CHANGE UP (ref 80–100)
MONOCYTES # BLD AUTO: 0.1 K/UL — SIGNIFICANT CHANGE UP (ref 0–0.9)
MONOCYTES NFR BLD AUTO: 2.2 % — SIGNIFICANT CHANGE UP (ref 2–14)
MYELOCYTES NFR BLD: 2.2 % — HIGH (ref 0–0)
NEUTROPHILS # BLD AUTO: 4.27 K/UL — SIGNIFICANT CHANGE UP (ref 1.8–7.4)
NEUTROPHILS NFR BLD AUTO: 95.6 % — HIGH (ref 43–77)
PHOSPHATE SERPL-MCNC: 1.8 MG/DL — LOW (ref 2.5–4.5)
PLAT MORPH BLD: NORMAL — SIGNIFICANT CHANGE UP
PLATELET # BLD AUTO: 133 K/UL — LOW (ref 150–400)
POTASSIUM SERPL-MCNC: 4 MMOL/L — SIGNIFICANT CHANGE UP (ref 3.5–5.3)
POTASSIUM SERPL-SCNC: 4 MMOL/L — SIGNIFICANT CHANGE UP (ref 3.5–5.3)
PROTHROM AB SERPL-ACNC: 12.7 SEC — SIGNIFICANT CHANGE UP (ref 9.5–13)
RBC # BLD: 2.68 M/UL — LOW (ref 4.2–5.8)
RBC # FLD: 18.7 % — HIGH (ref 10.3–14.5)
RBC BLD AUTO: SIGNIFICANT CHANGE UP
SODIUM SERPL-SCNC: 140 MMOL/L — SIGNIFICANT CHANGE UP (ref 135–145)
TACROLIMUS SERPL-MCNC: 10.6 NG/ML — SIGNIFICANT CHANGE UP
WBC # BLD: 4.47 K/UL — SIGNIFICANT CHANGE UP (ref 3.8–10.5)
WBC # FLD AUTO: 4.47 K/UL — SIGNIFICANT CHANGE UP (ref 3.8–10.5)

## 2023-08-19 PROCEDURE — 99233 SBSQ HOSP IP/OBS HIGH 50: CPT

## 2023-08-19 PROCEDURE — 93971 EXTREMITY STUDY: CPT | Mod: 26,LT

## 2023-08-19 RX ORDER — SODIUM,POTASSIUM PHOSPHATES 278-250MG
1 POWDER IN PACKET (EA) ORAL ONCE
Refills: 0 | Status: COMPLETED | OUTPATIENT
Start: 2023-08-19 | End: 2023-08-19

## 2023-08-19 RX ORDER — DIPHENHYDRAMINE HCL 50 MG
25 CAPSULE ORAL ONCE
Refills: 0 | Status: COMPLETED | OUTPATIENT
Start: 2023-08-19 | End: 2023-08-19

## 2023-08-19 RX ORDER — INSULIN GLARGINE 100 [IU]/ML
21 INJECTION, SOLUTION SUBCUTANEOUS
Refills: 0 | DISCHARGE
Start: 2023-08-19

## 2023-08-19 RX ORDER — ACETAMINOPHEN 500 MG
650 TABLET ORAL ONCE
Refills: 0 | Status: COMPLETED | OUTPATIENT
Start: 2023-08-19 | End: 2023-08-19

## 2023-08-19 RX ORDER — IMMUNE GLOBULIN (HUMAN) 10 G/100ML
9 INJECTION INTRAVENOUS; SUBCUTANEOUS ONCE
Refills: 0 | Status: COMPLETED | OUTPATIENT
Start: 2023-08-19 | End: 2023-08-19

## 2023-08-19 RX ORDER — CARVEDILOL PHOSPHATE 80 MG/1
1 CAPSULE, EXTENDED RELEASE ORAL
Qty: 0 | Refills: 0 | DISCHARGE
Start: 2023-08-19

## 2023-08-19 RX ORDER — TACROLIMUS 5 MG/1
5 CAPSULE ORAL
Refills: 0 | Status: DISCONTINUED | OUTPATIENT
Start: 2023-08-20 | End: 2023-08-20

## 2023-08-19 RX ORDER — APIXABAN 2.5 MG/1
5 TABLET, FILM COATED ORAL EVERY 12 HOURS
Refills: 0 | Status: DISCONTINUED | OUTPATIENT
Start: 2023-08-19 | End: 2023-08-20

## 2023-08-19 RX ADMIN — Medication 2: at 09:03

## 2023-08-19 RX ADMIN — FAMOTIDINE 20 MILLIGRAM(S): 10 INJECTION INTRAVENOUS at 12:57

## 2023-08-19 RX ADMIN — Medication 5 MILLIGRAM(S): at 20:55

## 2023-08-19 RX ADMIN — Medication 1 PACKET(S): at 17:49

## 2023-08-19 RX ADMIN — Medication 10 UNIT(S): at 09:03

## 2023-08-19 RX ADMIN — ATORVASTATIN CALCIUM 40 MILLIGRAM(S): 80 TABLET, FILM COATED ORAL at 21:11

## 2023-08-19 RX ADMIN — Medication 25 MILLIGRAM(S): at 12:55

## 2023-08-19 RX ADMIN — INSULIN GLARGINE 10 UNIT(S): 100 INJECTION, SOLUTION SUBCUTANEOUS at 20:55

## 2023-08-19 RX ADMIN — SENNA PLUS 2 TABLET(S): 8.6 TABLET ORAL at 20:55

## 2023-08-19 RX ADMIN — Medication 10 UNIT(S): at 12:56

## 2023-08-19 RX ADMIN — Medication 500000 UNIT(S): at 20:56

## 2023-08-19 RX ADMIN — Medication 500000 UNIT(S): at 17:49

## 2023-08-19 RX ADMIN — Medication 500000 UNIT(S): at 12:57

## 2023-08-19 RX ADMIN — MYCOPHENOLATE MOFETIL 1000 MILLIGRAM(S): 250 CAPSULE ORAL at 09:04

## 2023-08-19 RX ADMIN — CARVEDILOL PHOSPHATE 3.12 MILLIGRAM(S): 80 CAPSULE, EXTENDED RELEASE ORAL at 05:10

## 2023-08-19 RX ADMIN — CARVEDILOL PHOSPHATE 3.12 MILLIGRAM(S): 80 CAPSULE, EXTENDED RELEASE ORAL at 17:50

## 2023-08-19 RX ADMIN — Medication 81 MILLIGRAM(S): at 12:56

## 2023-08-19 RX ADMIN — MYCOPHENOLATE MOFETIL 1000 MILLIGRAM(S): 250 CAPSULE ORAL at 19:47

## 2023-08-19 RX ADMIN — Medication 500000 UNIT(S): at 05:10

## 2023-08-19 RX ADMIN — Medication 1 TABLET(S): at 12:56

## 2023-08-19 RX ADMIN — Medication 2: at 12:56

## 2023-08-19 RX ADMIN — Medication 20 MILLIGRAM(S): at 05:10

## 2023-08-19 RX ADMIN — Medication 650 MILLIGRAM(S): at 12:56

## 2023-08-19 RX ADMIN — Medication 2: at 17:50

## 2023-08-19 RX ADMIN — IMMUNE GLOBULIN (HUMAN) 15 GRAM(S): 10 INJECTION INTRAVENOUS; SUBCUTANEOUS at 13:55

## 2023-08-19 RX ADMIN — Medication 10 UNIT(S): at 17:50

## 2023-08-19 RX ADMIN — TACROLIMUS 6 MILLIGRAM(S): 5 CAPSULE ORAL at 09:04

## 2023-08-19 RX ADMIN — APIXABAN 5 MILLIGRAM(S): 2.5 TABLET, FILM COATED ORAL at 20:55

## 2023-08-19 NOTE — PROGRESS NOTE ADULT - SUBJECTIVE AND OBJECTIVE BOX
[facility] DIVISION OF KIDNEY DISEASES AND HYPERTENSION -- FOLLOW UP NOTE  --------------------------------------------------------------------------------  Authored by: Nba Aguilar   Cell # [phone]     ROSA CONDE was seen and examined at bedside.   Patient is a 53y old  Male who presents with a chief complaint of UTI, fecal impaction (08-18-23)      24 hour events/subjective: No major events. Voiding urine, tolerating diet, ambulating, no NVD. No fever.   LUE swelling persists, has discomfort.       Standing Inpatient Medications  acetaminophen     Tablet .. 650 milliGRAM(s) Oral once  aspirin enteric coated 81 milliGRAM(s) Oral daily  atorvastatin 40 milliGRAM(s) Oral at bedtime  bisacodyl 5 milliGRAM(s) Oral at bedtime  carvedilol 3.125 milliGRAM(s) Oral every 12 hours  diphenhydrAMINE 25 milliGRAM(s) Oral once  famotidine    Tablet 20 milliGRAM(s) Oral daily  immune   globulin 10% (GAMMAGARD) IVPB 9 Gram(s) IV Intermittent once  insulin glargine Injectable (LANTUS) 10 Unit(s) SubCutaneous at bedtime  insulin lispro (ADMELOG) corrective regimen sliding scale   SubCutaneous three times a day before meals  insulin lispro (ADMELOG) corrective regimen sliding scale   SubCutaneous at bedtime  insulin lispro Injectable (ADMELOG) 10 Unit(s) SubCutaneous three times a day before meals  mycophenolate mofetil 1000 milliGRAM(s) Oral two times a day  nystatin    Suspension [id number] Unit(s) Oral four times a day  predniSONE   Tablet 20 milliGRAM(s) Oral daily  senna 2 Tablet(s) Oral at bedtime  tacrolimus ER Tablet (ENVARSUS XR) 6 milliGRAM(s) Oral <User Schedule>  trimethoprim   80 mG/sulfamethoxazole 400 mG 1 Tablet(s) Oral daily  valGANciclovir 450 milliGRAM(s) Oral <User Schedule>    PRN Inpatient Medications  dextrose Oral Gel 15 Gram(s) Oral once PRN        VITALS/PHYSICAL EXAM  --------------------------------------------------------------------------------  T(C): 36.9 (08-19-23 @ 08:30), Max: 37.1 (08-19-23 @ 01:15)  HR: 80 (08-19-23 @ 08:30) (77 - 87)  BP: 147/73 (08-19-23 @ 08:30) (103/61 - 158/79)  RR: 18 (08-19-23 @ 08:30) (17 - 19)  SpO2: 97% (08-19-23 @ 08:30) (97% - 100%)    08-18-23 @ 07:01  -  08-19-23 @ 07:00  --------------------------------------------------------  IN: 2140 mL / OUT: 1125 mL / NET: 1015 mL      Physical Exam:  Awake and alert  Comfortable   LUE diffuse swelling   AVF + thrill  Abdomen obese, soft non tender  No LE edema     LABS/STUDIES  --------------------------------------------------------------------------------              8.3    4.47  >-----------<  133      [08-19-23 @ 07:02]              26.5     140  |  111  |  37  ----------------------------<  139      [08-19-23 @ 06:58]  4.0   |  17  |  1.82        Ca     9.3     [08-19-23 @ 06:58]      iCa    1.32     [08-19 @ 06:58]      Mg     1.9     [08-19-23 @ 06:58]      Phos  1.8     [08-19-23 @ 06:58]      PT/INR: PT 12.7 , INR 1.16       [08-19-23 @ 07:03]  PTT: 29.6       [08-19-23 @ 07:03]      Creatinine Trend:  SCr 1.82 [08-19 @ 06:58]  SCr 1.88 [08-18 @ 05:41]  SCr 2.10 [08-17 @ 07:08]  SCr 2.12 [08-16 @ 06:38]  SCr 2.09 [08-15 @ 06:18]    Tacrolimus (), Serum: 10.6 ng/mL (08-19 @ 07:02)  Tacrolimus (), Serum: 10.4 ng/mL (08-18 @ 05:41)  Tacrolimus (), Serum: 8.6 ng/mL (08-17 @ 07:08)  Tacrolimus (), Serum: 8.3 ng/mL (08-16 @ 06:37)      CAPILLARY BLOOD GLUCOSE  POCT Blood Glucose.: 155 mg/dL (19 Aug 2023 08:32)  POCT Blood Glucose.: 146 mg/dL (19 Aug 2023 00:41)  POCT Blood Glucose.: 112 mg/dL (18 Aug 2023 21:45)  POCT Blood Glucose.: 123 mg/dL (18 Aug 2023 18:09)  POCT Blood Glucose.: 141 mg/dL (18 Aug 2023 17:03)  POCT Blood Glucose.: 198 mg/dL (18 Aug 2023 12:42)      Urinalysis - [08-19-23 @ 06:58]      Color  / Appearance  / SG  / pH       Gluc 139 / Ketone   / Bili  / Urobili        Blood  / Protein  / Leuk Est  / Nitrite       RBC  / WBC  / Hyaline  / Gran  / Sq Epi  / Non Sq Epi  / Bacteria       Iron 68, TIBC 185, %sat 37      [08-16-23 @ 06:39]  Ferritin 1010      [08-16-23 @ 06:39]    HBsAb Reactive      [08-17-23 @ 11:28]  HBsAg Nonreact      [08-17-23 @ 11:28]

## 2023-08-19 NOTE — PROGRESS NOTE ADULT - ASSESSMENT
54 yo M with hx of DM2 CAD, CHF, s/p CABG 2015, AICD (2016), on hemodialysis for approximately 6 years via L AVF (nephrologist Dr. Bharath Romo), He has hx of PVD, is s/p 4 stents in R leg (mid and distal right superficial femoral artery, right popliteal x2 on 1/14/22).; is sp RT big toe and second toe amputation 2021, on asa and plavix for severe PVD (per pt). Now here for possible DDRT. Pt reports does not make urine only few drops occasionally. Pt denies N/V/D, fevers/chills, CP, SOB. Pt reports last ate at 4pm today and had HD yesterday 7/14/23.  Nephrology consulted for ESRD s/p DDRT.      s/p DDRT @ [facility] 7/16/2023   Initially received Simulect induction -- Changed to Thymoglobulin given delayed graft function.  Pt discharged on previous admission on HD.   Post transport course significant for DGF  Via L AVF --> s/p Left Radiocephalic fistulogram and subclavian vein angioplasty with Vascular 7/24/2023  Consent obtained from patient and placed in chart.  s/p CT A& P No contrast 8/7/23 --> partially imaged L pleural effusion, unchanged. Partially  imaged LLL opacity again seen.  small amount of dependent air within urinary bladder, small renal cysts, left transplant kidney with mil hydro. Stent extends from kidney to bladder.   s/p Doppler Renal US Transplant kidney 8/7/23 --> patent renal vasculature. Elevated renal artery peak systolic velocities. Concern for possible renal artery stenosis at anastomosis. Mild elevation  of intrarenal resistive indices , grossly unchanged from prior exam.   Continue Immunosuppression per transplant : Envarsus per level ( 6mg) , Cellcept 1 gm bid, and pulse steroids, prednisone held while pt on pulse steroids   Tacro trough is 10.4 on 8/18   FK is 10.6 on 8/19 would reduced Tacrolimus to 3 mg BID  s/p Transplant renal biopsy 8/09 --Renal biopsy  showing AMR and diabetic nephrosclerosis (g1, ptc0-1 c4d 0). Plan for total 5 sessions of PLEX, s/p PLEX on  8/11, 8/12. #4 8/16, plan for Rituximab today following 5th PLEX. DSA labs sent 8/16 - pending   Monitor labs and urine output. Avoid nephrotoxins  HD per transplant, last HD 8/7 . Pt off Furosemide.   Dose medications as per eGFR.  Renal Diet   Monitor     Anemia  Mgmt per Transplant   s/p pRBC 8/17  Maintain Hgb > 8  Monitor     HTN:   BP controlled   Monitor     Proteinuria / Hematuria   likely in setting of + LE, bacteria  Urine cx + Klebsiella   On Cefazolin  Mgmt per Transplant ID  52 yo M with hx of DM2 CAD, CHF, s/p CABG 2015, AICD (2016), on hemodialysis for approximately 6 years via L AVF (nephrologist Dr. Bharath Romo), He has hx of PVD, is s/p 4 stents in R leg (mid and distal right superficial femoral artery, right popliteal x2 on 1/14/22).; is sp RT big toe and second toe amputation 2021, on asa and plavix for severe PVD (per pt). Now here for possible DDRT. Pt reports does not make urine only few drops occasionally. Pt denies N/V/D, fevers/chills, CP, SOB. Pt reports last ate at 4pm today and had HD yesterday 7/14/23.  Nephrology consulted for ESRD s/p DDRT.      s/p DDRT @ [facility] 7/16/2023   Initially received Simulect induction -- Changed to Thymoglobulin given delayed graft function.  Pt discharged on previous admission on HD.   Post transport course significant for DGF  Via L AVF --> s/p Left Radiocephalic fistulogram and subclavian vein angioplasty with Vascular 7/24/2023  Consent obtained from patient and placed in chart.  s/p CT A& P No contrast 8/7/23 --> partially imaged L pleural effusion, unchanged. Partially  imaged LLL opacity again seen.  small amount of dependent air within urinary bladder, small renal cysts, left transplant kidney with mil hydro. Stent extends from kidney to bladder.   s/p Doppler Renal US Transplant kidney 8/7/23 --> patent renal vasculature. Elevated renal artery peak systolic velocities. Concern for possible renal artery stenosis at anastomosis. Mild elevation  of intrarenal resistive indices , grossly unchanged from prior exam.   Continue Immunosuppression per transplant : Envarsus per level ( 6mg) , Cellcept 1 gm bid, and pulse steroids, prednisone held while pt on pulse steroids   Tacro trough is 10.4 on 8/18   FK is 10.6 on 8/19 would reduced Tacrolimus to 3 mg BID  s/p Transplant renal biopsy 8/09 --Renal biopsy  showing AMR and diabetic nephrosclerosis (g1, ptc0-1 c4d 0). Plan for total 5 sessions of PLEX, s/p PLEX on  8/11, 8/12. #4 8/16, plan for Rituximab today following 5th PLEX. DSA labs sent 8/16 - pending   Monitor labs and urine output. Avoid nephrotoxins  HD per transplant, last HD 8/7 . Pt off Furosemide.   Dose medications as per eGFR.  Renal Diet   Monitor     Anemia  Mgmt per Transplant   s/p pRBC 8/17  Maintain Hgb > 8  Monitor     Hypophosphatemia  Supplement as needed.     HTN:   BP controlled   Monitor     Proteinuria / Hematuria   likely in setting of + LE, bacteria  Urine cx + Klebsiella   On Cefazolin  Mgmt per Transplant ID

## 2023-08-19 NOTE — PROGRESS NOTE ADULT - SUBJECTIVE AND OBJECTIVE BOX
Patient is a 53y old  Male who presents with a chief complaint of UTI, fecal impaction (19 Aug 2023 11:11)      DATE OF SERVICE: 08-19-23 @ 12:36    SUBJECTIVE / OVERNIGHT EVENTS: overnight events noted    ROS:  Resp: No cough no sputum production  CVS: No chest pain no palpitations no orthopnea  GI: no N/V/D  : no dysuria, no hematuria  "I am going home"         MEDICATIONS  (STANDING):  acetaminophen     Tablet .. 650 milliGRAM(s) Oral once  aspirin enteric coated 81 milliGRAM(s) Oral daily  atorvastatin 40 milliGRAM(s) Oral at bedtime  bisacodyl 5 milliGRAM(s) Oral at bedtime  carvedilol 3.125 milliGRAM(s) Oral every 12 hours  dextrose 5%. 1000 milliLiter(s) (50 mL/Hr) IV Continuous <Continuous>  dextrose 5%. 1000 milliLiter(s) (100 mL/Hr) IV Continuous <Continuous>  dextrose 50% Injectable 12.5 Gram(s) IV Push once  dextrose 50% Injectable 25 Gram(s) IV Push once  dextrose 50% Injectable 25 Gram(s) IV Push once  diphenhydrAMINE 25 milliGRAM(s) Oral once  famotidine    Tablet 20 milliGRAM(s) Oral daily  glucagon  Injectable 1 milliGRAM(s) IntraMuscular once  immune   globulin 10% (GAMMAGARD) IVPB 9 Gram(s) IV Intermittent once  insulin glargine Injectable (LANTUS) 10 Unit(s) SubCutaneous at bedtime  insulin lispro (ADMELOG) corrective regimen sliding scale   SubCutaneous at bedtime  insulin lispro (ADMELOG) corrective regimen sliding scale   SubCutaneous three times a day before meals  insulin lispro Injectable (ADMELOG) 10 Unit(s) SubCutaneous three times a day before meals  mycophenolate mofetil 1000 milliGRAM(s) Oral two times a day  nystatin    Suspension 564753 Unit(s) Oral four times a day  predniSONE   Tablet 20 milliGRAM(s) Oral daily  senna 2 Tablet(s) Oral at bedtime  sodium phosphate 30 milliMole(s)/500 mL IVPB 30 milliMole(s) IV Intermittent once  tacrolimus ER Tablet (ENVARSUS XR) 6 milliGRAM(s) Oral <User Schedule>  trimethoprim   80 mG/sulfamethoxazole 400 mG 1 Tablet(s) Oral daily  valGANciclovir 450 milliGRAM(s) Oral <User Schedule>    MEDICATIONS  (PRN):  dextrose Oral Gel 15 Gram(s) Oral once PRN Blood Glucose LESS THAN 70 milliGRAM(s)/deciliter        CAPILLARY BLOOD GLUCOSE      POCT Blood Glucose.: 152 mg/dL (19 Aug 2023 12:21)  POCT Blood Glucose.: 155 mg/dL (19 Aug 2023 08:32)  POCT Blood Glucose.: 146 mg/dL (19 Aug 2023 00:41)  POCT Blood Glucose.: 112 mg/dL (18 Aug 2023 21:45)  POCT Blood Glucose.: 123 mg/dL (18 Aug 2023 18:09)  POCT Blood Glucose.: 141 mg/dL (18 Aug 2023 17:03)  POCT Blood Glucose.: 198 mg/dL (18 Aug 2023 12:42)    I&O's Summary    18 Aug 2023 07:01  -  19 Aug 2023 07:00  --------------------------------------------------------  IN: 2140 mL / OUT: 1125 mL / NET: 1015 mL        Vital Signs Last 24 Hrs  T(C): 36.4 (19 Aug 2023 12:01), Max: 37.1 (19 Aug 2023 01:15)  T(F): 97.6 (19 Aug 2023 12:01), Max: 98.7 (19 Aug 2023 01:15)  HR: 79 (19 Aug 2023 12:01) (77 - 87)  BP: 138/75 (19 Aug 2023 12:01) (103/61 - 158/79)  BP(mean): --  RR: 18 (19 Aug 2023 12:01) (17 - 19)  SpO2: 100% (19 Aug 2023 12:01) (97% - 100%)    PHYSICAL EXAM:  EYES: EOMI, PERRLA  CHEST/LUNG: clear   HEART: S1 S2; no murmurs   ABDOMEN: Soft, Nontender  EXTREMITIES:  no edema  NEUROLOGY: AO x 3 non-focal  SKIN: No rashes or lesions    LABS:                        8.3    4.47  )-----------( 133      ( 19 Aug 2023 07:02 )             26.5     08-19    140  |  111<H>  |  37<H>  ----------------------------<  139<H>  4.0   |  17<L>  |  1.82<H>    Ca    9.3      19 Aug 2023 06:58  Phos  1.8     08-19  Mg     1.9     08-19      PT/INR - ( 19 Aug 2023 07:03 )   PT: 12.7 sec;   INR: 1.16 ratio         PTT - ( 19 Aug 2023 07:03 )  PTT:29.6 sec      Urinalysis Basic - ( 19 Aug 2023 06:58 )    Color: x / Appearance: x / SG: x / pH: x  Gluc: 139 mg/dL / Ketone: x  / Bili: x / Urobili: x   Blood: x / Protein: x / Nitrite: x   Leuk Esterase: x / RBC: x / WBC x   Sq Epi: x / Non Sq Epi: x / Bacteria: x          All consultant(s) notes reviewed and care discussed with other providers        Contact Number, Dr Murphy [phone]

## 2023-08-19 NOTE — PROGRESS NOTE ADULT - ASSESSMENT
53 yr old man with ESRD on HD for 6 years. DDRT o 7/14/23. PMH: DM2, CAD, CHF, s/p CABG 2015, AICD (2016), PVD, is s/p 4 stents in R leg , right big toe and second toe amputation 2021. Received DDRT on 7/14/23. Donor 59 yrs old, KDPI 83%, Terminal Cr 0.8. Course complicated by DGF. Received Thymo induction. Admitted with UTI and persistent graft dysfunction. Treated for Klebsiella UTI on Cefazolin. Transplant kidney biopsy 8/9/23 showed mild glomerulitis and PTC suspicious for mild ABMR scotty in the setting of Class II DSA 5900 and 5100. No vasculitis, c4d negative. Biopsy also showed diffuse and nodular diabetic glomerulosclerosis, early and mild, with moderately thickened glomerular basement membranes, and severe arterial and arteriolar sclerosis, most likely early diabetic nephropathy and is donor related. He was treated with Plex x3, Steroid pulse, Rituximab X1. Planned for IVIG 8/19 and d/c. MMF tac and steroid taper. Last HD was on 8/7/23. Recovered DGF Cr 1.8 downtrending.    S/p DDRT on 7/14/23 with delayed graft function, now recovered, last HD was on 8/7/23. Cr down to 1.8mg/dL.   Biopsy with mild ABMR, vascular sclerosis and donor related diabetic changes.     Biopsy proven mild ABMR (glomerulitis and PTC but no vasculitis, negative C4d)  in the setting of class II DSA ~ 5900 and 5100.   S/p Steroids Plex x5, Ritux x1 and IVIG x 4 - last dose of IVIG today.     Immunosuppression: Envarsus target trough 8-10, MMF 1 gram po bid, prednisone 20mg - taper as outpatient     Infection prophylaxis - nystatin, bactrim, valcyte     LUE swelling due to central venous stenosis - f/u with [facility] as outpatient     Anemia multifactorial - CKD, medications.   Epo 10k given on 8/11 and 8/18. s/p 1 u PRBC on 8/17   Hgb relatively stable     D/c home today after last dose of IVIG  F/u with Dr. Hairston

## 2023-08-19 NOTE — DISCHARGE NOTE PROVIDER - CARE PROVIDERS DIRECT ADDRESSES
[email or web address]

## 2023-08-19 NOTE — DISCHARGE NOTE PROVIDER - NSDCCPCAREPLAN_GEN_ALL_CORE_FT
PRINCIPAL DISCHARGE DIAGNOSIS  Diagnosis: Antibody mediated rejection of renal transplant  Assessment and Plan of Treatment: s/p PLEX/IVIG x5 sessions, s/p Rituximab x1 session  Pulse steroids completed, tapered to 20mg daily   Improvement in graft function, now off dialysis      SECONDARY DISCHARGE DIAGNOSES  Diagnosis: Kidney transplanted  Assessment and Plan of Treatment: Contact our Transplant clinic at [phone], return to our Emergency Department or NOTIFY YOUR NEPHROLOGIST IF:  You have any bleeding that does not stop, any fever (over 100.4 F) or chills, persistent nausea/vomiting with inability to tolerate food or liquids, persistent diarrhea, and/or if you develop severe abdominal pain, confusion, any signs of bleeding, any urinary changes.      Diagnosis: Immunosuppression  Assessment and Plan of Treatment: - Transplant recipients are at risk for developing infection because immune system is lowered due to transplant medications.   - Avoid contact with sick people; practice good hand hygiene;   - Take medications as directed by your translant team. Never stop taking medications unless instructed by your transplant physician.  - Do NOT double up medication dose if you missed your dose; call the transplant office for instructions.        Diagnosis: Constipation  Assessment and Plan of Treatment: Continue bowel regimen at home    Diagnosis: Acute cystitis  Assessment and Plan of Treatment: s/p treatment of Klebsiella UTI with IV antibiotics    Diagnosis: Diabetes mellitus  Assessment and Plan of Treatment: If you have diabetes, you may need to monitor your blood sugar more frequently after transplant. Uncongrolled blood sugar levels can lead to poor wound healing and other complications. Follow a low carb and low sugar diet. Continue to take all anti-diabetic medications/insulin as prescribed. Follow up with your Primary Care Doctor regularly for blood sugar/A1c checks. Follow up with an opthalmologist and a podiatrist on an annual basis.

## 2023-08-19 NOTE — CHART NOTE - NSCHARTNOTEFT_GEN_A_CORE
53M with PMH of HTN, CHF (s/p AICD 2016), CAD (s/p CABG 2015), IDDM, PVD s/p stent x4 in RLE with R big toe/second toe amputation (2021) and ESRD on HD via LUE AVF for 6 years now s/p DDRT 7/16/2023.     Hematology called for recommendations regarding persistent DVT in left subclavian vein.     #Persistent DVT in left subclavian vein  - s/p subclavian vein angioplasty 7/24 with vascular surgery   - LUE Duplex (8/19) demonstrated persistent DVT in L subclavian vein and SVT of cephalic vein in mid forearm  - Per primary team not actively on HD, no lines in LUE    Recommend:  - hypercoagulable workup: APLS labs (lupus anticoagulant profile, anti-cardiolipin Ab, anti-beta 2 glycoprotein Ab), factor V leiden, Protein C, protein S, heparin antithrombin assay  - Appreciate vascular surgery recs  - Recommend starting anticoagulation - type of anticoagulation per primary team/transplant Nephrology. If there is concern for reduced creatinine clearance, then warfarin would be preferable over a DOAC    Jorje Davis, PGY-4  Fellow Hematology/Oncology  pager [phone]  Available on TEAMS  After 5pm or on weekends please contact  to page on-call fellow 53M with PMH of HTN, CHF (s/p AICD 2016), CAD (s/p CABG 2015), IDDM, PVD s/p stent x4 in RLE with R big toe/second toe amputation (2021) and ESRD on HD via LUE AVF for 6 years now s/p DDRT 7/16/2023.  IR biopsy on 8/10 concern for antibody mediated rejection, s/p PLEX/IVIG, rituximab.    Hematology called for recommendations regarding persistent DVT in left subclavian vein.     #Persistent DVT in left subclavian vein  - s/p subclavian vein angioplasty 7/24 with vascular surgery   - LUE Duplex (8/19) demonstrated persistent DVT in L subclavian vein and SVT of cephalic vein in mid forearm  - Per primary team not actively on HD, no lines in LUE    Recommend:  - hypercoagulable workup: APLS labs (lupus anticoagulant profile, anti-cardiolipin Ab, anti-beta 2 glycoprotein Ab), factor V leiden, Protein C, protein S, heparin antithrombin assay  - Appreciate vascular surgery recs  - Recommend starting anticoagulation - type of anticoagulation per primary team/transplant Nephrology. If there is concern for reduced creatinine clearance, then warfarin would be preferable over a DOAC    Jorje Davis, PGY-4  Fellow Hematology/Oncology  pager [phone]  Available on TEAMS  After 5pm or on weekends please contact  to page on-call fellow

## 2023-08-19 NOTE — DISCHARGE NOTE PROVIDER - HOSPITAL COURSE
Tee Salvador is a 52 y/o M with past medical history significant for HTN, CHF (s/p AICD 2016), CAD (s/p CABG 2015), IDDM, PVD s/p stent x4 in RLE with R big toe/second toe amputation (2021) and ESRD on HD via LUE AVF for 6 years now s/p DDRT 7/16/2023 with Thymoglobulin induction. Post-transplant course complicated by severe constipation requiring disimpaction, DGF requiring hemodialysis and LUE swelling with concern for L subclavian thrombus, now s/p IR fistulogram 7/7/23 with balloon angioplasty of subclavian vein. He was discharged home on 7/27/23 and remained on HD MWF at home center [facility]. He presents from home via EMS to [facility] on 8/7/23 with abdominal discomfort and no bowel movement for 3 days.     Patient reports that he has had no bowel movement, reports this is "just like what happened last time". Reports he is straining but has "no abdominal muscles" to have BM. Denies nausea/vomiting. Reports reduced PO intake and appetite. Denies fevers, chills. Reports increasing urine output up to 500ml/day, still with LIANA in place from transplant surgery. Reports his last HD was on Friday 8/4/23 with 1.75L removed.     Hospital course was as follows:  - Found to have Klebsiella UTI, transplant ID consulted, completed 7 day course of antibiotics (Ancef sensitive) last dose 8/16/23  - Blood cultures remained with NGTD, still with ureteral stent in place   - DGF: Last HD was on 8/8/23 with improvement in graft function   - Constipation: Lactulose, enemas, senna/miralax given with return of bowel function  - DSAs send 8/7/23 positive >5000 MFI  - IR renal transplant biopsy on 8/9/23 with findings of AMR, C4d staining negative, early diabetic nephropathy (donor related)   - IV Steroids with Solu-medrol 500mg x1 (8/8/23) to Prednisone 20mg daily   - S/P PLEX/IVIG x5 sessions (first session 8/11, last PLEX 8/18, last IVIF 8/19)  - Rituximab s/p PLEX on 8/18   - Anemia of chronic disease: s/p 1u RBC 8/17  - LUE swelling noted 8/18 (where LUE AVF is) --> LUE duplex **************  - Creatinine on day of discharge 1.82  - Insulin regimen adjusted based on steroid induced hyperglycemia     Patient seen by the multidisciplinary renal transplant team and deemed stable for discharge today. He will follow up with transplant nephrology outpatient team on Tuesday 8/22/23 with lab work and to see aprovider. Tee Salvador is a 54 y/o M with past medical history significant for HTN, CHF (s/p AICD 2016), CAD (s/p CABG 2015), IDDM, PVD s/p stent x4 in RLE with R big toe/second toe amputation (2021) and ESRD on HD via LUE AVF for 6 years now s/p DDRT 7/16/2023 with Thymoglobulin induction. Post-transplant course complicated by severe constipation requiring disimpaction, DGF requiring hemodialysis and LUE swelling with concern for L subclavian thrombus, now s/p IR fistulogram 7/7/23 with balloon angioplasty of subclavian vein. He was discharged home on 7/27/23 and remained on HD MWF at home center [facility]. He presents from home via EMS to [facility] on 8/7/23 with abdominal discomfort and no bowel movement for 3 days.     Patient reports that he has had no bowel movement, reports this is "just like what happened last time". Reports he is straining but has "no abdominal muscles" to have BM. Denies nausea/vomiting. Reports reduced PO intake and appetite. Denies fevers, chills. Reports increasing urine output up to 500ml/day, still with LIANA in place from transplant surgery. Reports his last HD was on Friday 8/4/23 with 1.75L removed.     Hospital course was as follows:  - Found to have Klebsiella UTI, transplant ID consulted, completed 7 day course of antibiotics (Ancef sensitive) last dose 8/16/23  - Blood cultures remained with NGTD, still with ureteral stent in place   - DGF: Last HD was on 8/8/23 with improvement in graft function  - Constipation: Lactulose, enemas, senna/miralax given with return of bowel function  - DSAs send 8/7/23 positive >5000 MFI  - IR renal transplant biopsy on 8/9/23 with findings of AMR, C4d staining negative, early diabetic nephropathy (donor related)  - IV Steroids with Solu-medrol 500mg x1 (8/8/23) to Prednisone 20mg daily   - S/P PLEX/IVIG x5 sessions (first session 8/11, last PLEX 8/18, last IVIF 8/19)  - Rituximab s/p PLEX on 8/18   - Anemia of chronic disease: s/p 1u RBC 8/17  - LUE swelling noted 8/18 (where LUE AVF is) --> LUE duplex w/ persistent left subclavian DVT noted but patient wishes to address this with his own vascular surgeon  - Creatinine on day of discharge 1.79  - Insulin regimen adjusted based on steroid induced hyperglycemia     Patient seen by the multidisciplinary renal transplant team and deemed stable for discharge today. He will follow up with transplant nephrology outpatient team on Tuesday 8/22/23 with lab work and to see a provider.  ** Please have Factor V Leiden genetic testing sent at this time as patient did not want labs drawn on discharge**

## 2023-08-19 NOTE — DISCHARGE NOTE PROVIDER - CARE PROVIDER_API CALL
Michel Vizcaino  Surgery  [address] [place] [address]  Phone: [phone]  Fax: [phone]  Scheduled Appointment: 09/05/2023    Nba Aguilar  Nephrology  [address] [place] [address]  Phone: [phone]  Fax: [phone]  Scheduled Appointment: 08/22/2023

## 2023-08-19 NOTE — DISCHARGE NOTE PROVIDER - NSDCMRMEDTOKEN_GEN_ALL_CORE_FT
aspirin 81 mg oral delayed release tablet: 1 tab(s) orally once a day  atorvastatin 40 mg oral tablet: 1 tab(s) orally once a day (at bedtime)  carvedilol 3.125 mg oral tablet: 1 tab(s) orally every 12 hours  HumaLOG KwikPen 100 units/mL injectable solution: 10 unit(s) injectable 3 times a day (before meals)  insulin glargine 100 units/mL subcutaneous solution: 10 unit(s) subcutaneous once a day (at bedtime)  mycophenolate mofetil 250 mg oral capsule: 1,000 milligram(s) orally 2 times a day  nystatin 100,000 units/mL oral suspension: 5 milliliter(s) orally 4 times a day  Pepcid 20 mg oral tablet: 1 tab(s) orally once a day  predniSONE 20 mg oral tablet: 1 tab(s) orally once a day  senna leaf extract oral tablet: 2 tab(s) orally once a day (at bedtime)  sulfamethoxazole-trimethoprim 400 mg-80 mg oral tablet: 1 tab(s) orally once a day  tacrolimus 4 mg oral tablet, extended release: 2 tab(s) orally once a day 8AM  valGANciclovir 450 mg oral tablet: 1 tab(s) orally Monday, Wednesday, and Friday   aspirin 81 mg oral delayed release tablet: 1 tab(s) orally once a day  atorvastatin 40 mg oral tablet: 1 tab(s) orally once a day (at bedtime)  carvedilol 3.125 mg oral tablet: 1 tab(s) orally every 12 hours  Eliquis 5 mg oral tablet: 1 tab(s) orally 2 times a day  HumaLOG KwikPen 100 units/mL injectable solution: 10 unit(s) injectable 3 times a day (before meals)  insulin glargine 100 units/mL subcutaneous solution: 10 unit(s) subcutaneous once a day (at bedtime)  mycophenolate mofetil 250 mg oral capsule: 1,000 milligram(s) orally 2 times a day  nystatin 100,000 units/mL oral suspension: 5 milliliter(s) orally 4 times a day  Pepcid 20 mg oral tablet: 1 tab(s) orally once a day  predniSONE 20 mg oral tablet: 1 tab(s) orally once a day  senna leaf extract oral tablet: 2 tab(s) orally once a day (at bedtime)  sulfamethoxazole-trimethoprim 400 mg-80 mg oral tablet: 1 tab(s) orally once a day  tacrolimus 1 mg oral tablet, extended release: 5 tab(s) orally once a day  valGANciclovir 450 mg oral tablet: 1 tab(s) orally Monday, Wednesday, and Friday

## 2023-08-19 NOTE — DISCHARGE NOTE PROVIDER - PROVIDER TOKENS
PROVIDER:[TOKEN:[[id number]],SCHEDULEDAPPT:[09/05/2023]],PROVIDER:[TOKEN:[[id number]],SCHEDULEDAPPT:[08/22/2023]]

## 2023-08-19 NOTE — PROGRESS NOTE ADULT - ASSESSMENT
54 y/o M with past medical history significant for HTN, CHF (s/p AICD 2016), CAD (s/p CABG 2015), IDDM, PVD s/p stent x4 in RLE with R big toe/second toe amputation (2021) and ESRD on HD via LUE AVF for 6 years now s/p DDRT 7/16/2023 with Thymoglobulin induction.   Post-transplant course complicated by severe constipation requiring disimpaction, DGF requiring hemodialysis and LUE swelling with concern for L subclavian thrombus, now s/p IR fistulogram 7/7/23 with balloon angioplasty of subclavian vein.   discharged home on 7/27/23 and remained on HD MWF   He presents from home via EMS to [facility] on 8/7/23 with abdominal discomfort and no bowel movement for 3 days.     UTI     completed Ancef  continue Bactrim prophylaxis     Constipation  Continue daily bowel regimen      #S/P DDRT 7/16/23 with persistent DGF   management per renal transplant team   biopsy possible antibody mediated rejection  creatinine down to ~ 1.8  continue to monitor     #Hx CAD, CABG, s/p AICD  - Continue meds    #Hx IDDM now on insulin   - Continue home Lantus 18 units QHS,   continue finger sticks with short acting insulin sliding scale

## 2023-08-19 NOTE — PROGRESS NOTE ADULT - SUBJECTIVE AND OBJECTIVE BOX
[facility]   MD DANIEL WALDEN MD KRISTINE SOLTANPOUR, SHERIE ARGUELLO    TEL:  OFFICE: [phone]  From 5pm-7am Answering Service [phone]    -- RENAL FOLLOW UP NOTE ---Date of Service 08-19-23 @ 20:39    Patient is a 53y old  Male who presents with a chief complaint of UTI, fecal impaction (19 Aug 2023 16:17)      Patient seen and examined at bedside. No chest pain/sob    VITALS:  T(F): 97.9 (08-19-23 @ 20:30), Max: 98.8 (08-19-23 @ 18:21)  HR: 74 (08-19-23 @ 20:30)  BP: 137/71 (08-19-23 @ 20:30)  RR: 18 (08-19-23 @ 20:30)  SpO2: 100% (08-19-23 @ 20:30)  Wt(kg): --    08-18 @ 07:01  -  08-19 @ 07:00  --------------------------------------------------------  IN: 2140 mL / OUT: 1125 mL / NET: 1015 mL    08-19 @ 07:01  -  08-20 @ 00:39  --------------------------------------------------------  IN: 570 mL / OUT: 1375 mL / NET: -805 mL          PHYSICAL EXAM:  General: NAD  Neck: No JVD  Respiratory: CTAB, no wheezes, rales or rhonchi  Cardiovascular: S1, S2, RRR  Gastrointestinal: BS+, soft, NT/ND  Extremities: No peripheral edema    Hospital Medications:   MEDICATIONS  (STANDING):  apixaban 5 milliGRAM(s) Oral every 12 hours  aspirin enteric coated 81 milliGRAM(s) Oral daily  atorvastatin 40 milliGRAM(s) Oral at bedtime  bisacodyl 5 milliGRAM(s) Oral at bedtime  carvedilol 3.125 milliGRAM(s) Oral every 12 hours  dextrose 5%. 1000 milliLiter(s) (100 mL/Hr) IV Continuous <Continuous>  dextrose 5%. 1000 milliLiter(s) (50 mL/Hr) IV Continuous <Continuous>  dextrose 50% Injectable 25 Gram(s) IV Push once  dextrose 50% Injectable 12.5 Gram(s) IV Push once  dextrose 50% Injectable 25 Gram(s) IV Push once  famotidine    Tablet 20 milliGRAM(s) Oral daily  glucagon  Injectable 1 milliGRAM(s) IntraMuscular once  insulin glargine Injectable (LANTUS) 10 Unit(s) SubCutaneous at bedtime  insulin lispro (ADMELOG) corrective regimen sliding scale   SubCutaneous three times a day before meals  insulin lispro (ADMELOG) corrective regimen sliding scale   SubCutaneous at bedtime  insulin lispro Injectable (ADMELOG) 10 Unit(s) SubCutaneous three times a day before meals  mycophenolate mofetil 1000 milliGRAM(s) Oral two times a day  nystatin    Suspension 890742 Unit(s) Oral four times a day  predniSONE   Tablet 20 milliGRAM(s) Oral daily  senna 2 Tablet(s) Oral at bedtime  tacrolimus ER Tablet (ENVARSUS XR) 5 milliGRAM(s) Oral <User Schedule>  trimethoprim   80 mG/sulfamethoxazole 400 mG 1 Tablet(s) Oral daily  valGANciclovir 450 milliGRAM(s) Oral <User Schedule>    Tacrolimus (), Serum: 10.6 ng/mL (08-19 @ 07:02)    LABS:  08-19    140  |  111<H>  |  37<H>  ----------------------------<  139<H>  4.0   |  17<L>  |  1.82<H>    Ca    9.3      19 Aug 2023 06:58  Phos  1.8     08-19  Mg     1.9     08-19      Creatinine Trend: 1.82 <--, 1.88 <--, 2.10 <--, 2.12 <--, 2.09 <--, 2.30 <--, 2.35 <--    Phosphorus: 1.8 mg/dL (08-19 @ 06:58)                              8.3    4.47  )-----------( 133      ( 19 Aug 2023 07:02 )             26.5     Urine Studies:  Urinalysis - [08-19-23 @ 06:58]      Color  / Appearance  / SG  / pH       Gluc 139 / Ketone   / Bili  / Urobili        Blood  / Protein  / Leuk Est  / Nitrite       RBC  / WBC  / Hyaline  / Gran  / Sq Epi  / Non Sq Epi  / Bacteria       Iron 68, TIBC 185, %sat 37      [08-16-23 @ 06:39]  Ferritin 1010      [08-16-23 @ 06:39]    HBsAb Reactive      [08-17-23 @ 11:28]  HBsAg Nonreact      [08-17-23 @ 11:28]      RADIOLOGY & ADDITIONAL STUDIES:

## 2023-08-19 NOTE — DISCHARGE NOTE PROVIDER - NSDCFUSCHEDAPPT_GEN_ALL_CORE_FT
[facility]ain[facility]  [facility]  Scheduled Appointment: 09/05/2023    [facility] Physician [facility]  [facility] [address]  Scheduled Appointment: 09/14/2023

## 2023-08-19 NOTE — PROGRESS NOTE ADULT - ASSESSMENT
Tee Salvador is a 54 y/o M with past medical history significant for HTN, CHF (s/p AICD 2016--last interrogated 7/15/23), CAD (s/p CABG 2015), IDDM, PVD s/p stent x4 in RLE with R big toe/second toe amputation (2021) and ESRD on HD via LUE AVF for 6 years now s/p DDRT 7/16/2023 with Thymoglobulin induction. Post-transplant course complicated by severe constipation requiring disimpaction, DGF requiring hemodialysis and LUE swelling with concern for L subclavian thrombus, now s/p IR fistulogram 7/7/23 with balloon angioplasty of subclavian vein. He was discharged home on 7/27/23 and remained on HD MWF at home center [facility]. He presents from home via EMS to [facility] on 8/7/23 with constipation and continued DGF    [ ] DGF s/p DDRT 7/16/23  -S/P IR biopsy on 8/10 with concern for AMR. will continue PLEX/IVIG x 5 cycles.  Rituximab completed yesterday.   -DSA+ : will continue pulse dose steroids with taper (500 on 8/8, 250 on 8/9, 125 on 8/10, 60 on 8/12).  Prednisone 20 mg daily  - DSA labs sent today 8/16.   -graft function improving, Cr: 1.82, UOP: 1125ml.   -last HD on 8/7, furosemide on Hold.   -UTI: Klebsiella, CTX-->Ancef (total of 7D per ID), last day on 8/16, follow up repeat Ucx: Neg.   -BCx negative  -bowel regimen  -diet as tolerated  -SCDs  -epogen today, stable h/h. continue ASA    [ ] Immunosuppression  -Envarsus per level  -MMF 1/1  -steroids as above  -PPx regimen: Valcyte/Bactrim/Nystatin/PPI with renal dosing    [ ] DM  -Lantus/Lispro adjust while on steroids.     # [id number]  Transplant PA

## 2023-08-19 NOTE — PROGRESS NOTE ADULT - ASSESSMENT
52 y/o M with PMH of HTN, CHF (s/p AICD 2016), CAD (s/p CABG 2015), IDDM, PVD s/p stent x4 in RLE with R big toe/second toe amputation (2021) and ESRD on HD via LUE AVF for 6 years now s/p DDRT 7/16/2023 with Thymoglobulin induction. Post-transplant course complicated by severe constipation requiring disimpaction, DGF requiring hemodialysis and LUE swelling with concern for L subclavian thrombus, now s/p IR fistulogram 7/7/23 with balloon angioplasty of subclavian vein. Discharged home on 7/27/23 and remained on HD MWF. He presents from home via EMS to [facility] on 8/7/23 with abdominal discomfort and no bowel movement for 3 days. Vascular team consulted for LUE swelling, s/p L radiocephalic fistulagram and subclavian vein angioplasty (7/24). LUE Duplex (8/19) demonstrated persistent DVT in L subclavian vein and SVT of cephalic vein in mid forearm.      Reccommendations:  - ACE wrap LUE and elevate  - Anticoagulation medication  - Hematology consult for persistent subclavian vein DVT  - Vascular will continue to follow    Vascular Surgery  [id number]

## 2023-08-19 NOTE — PROGRESS NOTE ADULT - SUBJECTIVE AND OBJECTIVE BOX
C Team Surgery Progress Note     S: Patient seen and evaluated this pm. Patient had a previous L radiocephalic fistulagram and subclavian vein angioplasty on 7/24 for LUE swelling 2/2 L subclavian thrombus with Dr. Daly. Patient endorses LUE swelling for the past 4 weeks, since the kidney surgery. Denies any pain. Vacular team reconsulted for LUE swelling.      MEDICATIONS  (STANDING):  aspirin enteric coated 81 milliGRAM(s) Oral daily  atorvastatin 40 milliGRAM(s) Oral at bedtime  bisacodyl 5 milliGRAM(s) Oral at bedtime  carvedilol 3.125 milliGRAM(s) Oral every 12 hours  dextrose 5%. 1000 milliLiter(s) (50 mL/Hr) IV Continuous <Continuous>  dextrose 5%. 1000 milliLiter(s) (100 mL/Hr) IV Continuous <Continuous>  dextrose 50% Injectable 25 Gram(s) IV Push once  dextrose 50% Injectable 12.5 Gram(s) IV Push once  dextrose 50% Injectable 25 Gram(s) IV Push once  famotidine    Tablet 20 milliGRAM(s) Oral daily  glucagon  Injectable 1 milliGRAM(s) IntraMuscular once  insulin glargine Injectable (LANTUS) 10 Unit(s) SubCutaneous at bedtime  insulin lispro (ADMELOG) corrective regimen sliding scale   SubCutaneous three times a day before meals  insulin lispro (ADMELOG) corrective regimen sliding scale   SubCutaneous at bedtime  insulin lispro Injectable (ADMELOG) 10 Unit(s) SubCutaneous three times a day before meals  mycophenolate mofetil 1000 milliGRAM(s) Oral two times a day  nystatin    Suspension 243258 Unit(s) Oral four times a day  potassium phosphate / sodium phosphate Powder (PHOS-NaK) 1 Packet(s) Oral once  predniSONE   Tablet 20 milliGRAM(s) Oral daily  senna 2 Tablet(s) Oral at bedtime  trimethoprim   80 mG/sulfamethoxazole 400 mG 1 Tablet(s) Oral daily  valGANciclovir 450 milliGRAM(s) Oral <User Schedule>    MEDICATIONS  (PRN):  dextrose Oral Gel 15 Gram(s) Oral once PRN Blood Glucose LESS THAN 70 milliGRAM(s)/deciliter      T(C): 37.1 (08-19-23 @ 13:54), Max: 37.1 (08-19-23 @ 01:15)  HR: 90 (08-19-23 @ 13:54) (77 - 90)  BP: 148/76 (08-19-23 @ 13:54) (128/64 - 158/79)  RR: 17 (08-19-23 @ 13:54) (17 - 19)  SpO2: 100% (08-19-23 @ 13:54) (97% - 100%)        08-18-23 @ 07:01  -  08-19-23 @ 07:00  --------------------------------------------------------  IN: 2140 mL / OUT: 1125 mL / NET: 1015 mL        Physical Exam:  General: NAD, AOx3  Respiratory: No labored breathing  CV: pulse regularly present  MSK: b/l UE motor and sensation grossly intact. Pitting edema of the hand extending up to the shoulders.  Pulses exam: R radial and ulnar dopplerable signals. Unable to palpate pulses 2/2 to pitting edema.    LABS:                        8.3    4.47  )-----------( 133      ( 19 Aug 2023 07:02 )             26.5     08-19    140  |  111<H>  |  37<H>  ----------------------------<  139<H>  4.0   |  17<L>  |  1.82<H>    Ca    9.3      19 Aug 2023 06:58  Phos  1.8     08-19  Mg     1.9     08-19

## 2023-08-19 NOTE — PROGRESS NOTE ADULT - SUBJECTIVE AND OBJECTIVE BOX
Transplant Surgery - Multidisciplinary Progress Note  --------------------------------------------------------------  DDRT 7/16/2023    Present:   Patient seen and examined with multidisciplinary team including Transplant Surgeon: Dr. Delacruz. Transplant Nephrologist: Dr. Lauren Chris and unit RN during am rounds.  Disciplines not in attendance will be notified of the plan.     HPI: Tee Salvador is a 52 y/o M with past medical history significant for HTN, CHF (s/p AICD 2016--last interrogated 7/15/23), CAD (s/p CABG 2015), IDDM, PVD s/p stent x4 in RLE with R big toe/second toe amputation (2021) and ESRD on HD via LUE AVF for 6 years now s/p DDRT 7/16/2023 with Thymoglobulin induction. Post-transplant course complicated by severe constipation requiring disimpaction, DGF requiring hemodialysis and LUE swelling with concern for L subclavian thrombus, now s/p IR fistulogram 7/7/23 with balloon angioplasty of subclavian vein. He was discharged home on 7/27/23 and remained on HD MWF at home center [facility]. He presents from home via EMS to [facility] on 8/7/23 with constipation and continued DGF.     Interval Events:  - c/o LUE swelling.   - bx with concern for AMR, tolerating PLEX/IVIG/steroids.   - Completed 5cycles of PLEX, last dose of IVIG today.   - no complaints at this time  - Denies any Abdominal Pain.  - Have BM, passing gas  - Graft function, improving. Cr: 1.88  - UOP: 1125ML  -  VSS, Afebrile.   - Pt denies CP/SOB, fever/chills, N/V.     Immunosuppression:  Envarsus per level  MMF 1/1  pulse steroids with taper    Potential Discharge date: TBD    Education:  Medications    Plan of care:  See Below    MEDICATIONS  (STANDING):  aspirin enteric coated 81 milliGRAM(s) Oral daily  atorvastatin 40 milliGRAM(s) Oral at bedtime  bisacodyl 5 milliGRAM(s) Oral at bedtime  carvedilol 3.125 milliGRAM(s) Oral every 12 hours  dextrose 5%. 1000 milliLiter(s) (50 mL/Hr) IV Continuous <Continuous>  dextrose 5%. 1000 milliLiter(s) (100 mL/Hr) IV Continuous <Continuous>  dextrose 50% Injectable 12.5 Gram(s) IV Push once  dextrose 50% Injectable 25 Gram(s) IV Push once  dextrose 50% Injectable 25 Gram(s) IV Push once  famotidine    Tablet 20 milliGRAM(s) Oral daily  glucagon  Injectable 1 milliGRAM(s) IntraMuscular once  immune   globulin 10% (GAMMAGARD) IVPB 9 Gram(s) IV Intermittent once  insulin glargine Injectable (LANTUS) 10 Unit(s) SubCutaneous at bedtime  insulin lispro (ADMELOG) corrective regimen sliding scale   SubCutaneous three times a day before meals  insulin lispro (ADMELOG) corrective regimen sliding scale   SubCutaneous at bedtime  insulin lispro Injectable (ADMELOG) 10 Unit(s) SubCutaneous three times a day before meals  mycophenolate mofetil 1000 milliGRAM(s) Oral two times a day  nystatin    Suspension 213004 Unit(s) Oral four times a day  predniSONE   Tablet 20 milliGRAM(s) Oral daily  senna 2 Tablet(s) Oral at bedtime  tacrolimus ER Tablet (ENVARSUS XR) 6 milliGRAM(s) Oral <User Schedule>  trimethoprim   80 mG/sulfamethoxazole 400 mG 1 Tablet(s) Oral daily  valGANciclovir 450 milliGRAM(s) Oral <User Schedule>    MEDICATIONS  (PRN):  dextrose Oral Gel 15 Gram(s) Oral once PRN Blood Glucose LESS THAN 70 milliGRAM(s)/deciliter      PAST MEDICAL & SURGICAL HISTORY:  Diabetes mellitus  type 2      Foot ulcer due to secondary DM      Coronary artery disease      Acute on chronic systolic heart failure      H/O kidney transplant      Breast Reduction  at age 18      Toe amputation status, left      S/P CABG (coronary artery bypass graft)      AICD (automatic cardioverter/defibrillator) present    Vital Signs Last 24 Hrs  T(C): 36.4 (19 Aug 2023 12:01), Max: 37.1 (19 Aug 2023 01:15)  T(F): 97.6 (19 Aug 2023 12:01), Max: 98.7 (19 Aug 2023 01:15)  HR: 79 (19 Aug 2023 12:01) (77 - 87)  BP: 138/75 (19 Aug 2023 12:01) (110/64 - 158/79)  BP(mean): --  RR: 18 (19 Aug 2023 12:01) (17 - 19)  SpO2: 100% (19 Aug 2023 12:01) (97% - 100%)    Parameters below as of 19 Aug 2023 12:01  Patient On (Oxygen Delivery Method): room air    I&O's Summary    18 Aug 2023 07:01  -  19 Aug 2023 07:00  --------------------------------------------------------  IN: 2140 mL / OUT: 1125 mL / NET: 1015 mL                              8.3    4.47  )-----------( 133      ( 19 Aug 2023 07:02 )             26.5     08-19    140  |  111<H>  |  37<H>  ----------------------------<  139<H>  4.0   |  17<L>  |  1.82<H>    Ca    9.3      19 Aug 2023 06:58  Phos  1.8     08-19  Mg     1.9     08-19  Tacrolimus (), Serum: 10.6 ng/mL (08-19 @ 07:02)    Culture - Urine (collected 08-14-23 @ 11:24)  Source: Clean Catch Clean Catch (Midstream)  Final Report (08-15-23 @ 13:09):    <10,000 CFU/mL Normal Urogenital Alisia     Review of systems  Gen: No weight changes, fatigue, fevers/chills, weakness  Skin: No rashes  Head/Eyes/Ears/Mouth: No headache; Normal hearing; Normal vision w/o blurriness; No sinus pain/discomfort, sore throat  Respiratory: No dyspnea, cough, wheezing, hemoptysis  CV: No chest pain, PND, orthopnea  GI: no abdominal pain, denies diarrhea, constipation, nausea, vomiting, melena, hematochezia  : No increased frequency, dysuria, hematuria, nocturia  MSK: No joint pain/swelling; no back pain; no edema  Neuro: No dizziness/lightheadedness, weakness, seizures, numbness, tingling  Heme: No easy bruising or bleeding  Endo: No heat/cold intolerance  Psych: No significant nervousness, anxiety, stress, depression  All other systems were reviewed and are negative, except as noted.     PHYSICAL EXAM:  Constitutional: Well developed / well nourished  Eyes: Anicteric, PERRLA  ENMT: nc/at  Neck: supple  Respiratory: CTA B/L  Cardiovascular: RRR  Gastrointestinal: Soft, ND/NT. RLQ incision with some staples still in place.   Genitourinary: Voiding spontaneously  Extremities: SCD's in place and working bilaterally  Vascular: Palpable dp pulses bilaterally  Neurological: A&O x3  Skin: no rashes, ulcerations or lesions;  Musculoskeletal: Moving all extremities  Psychiatric: Responsive

## 2023-08-20 ENCOUNTER — TRANSCRIPTION ENCOUNTER (OUTPATIENT)
Age: 53
End: 2023-08-20

## 2023-08-20 VITALS
DIASTOLIC BLOOD PRESSURE: 73 MMHG | TEMPERATURE: 98 F | SYSTOLIC BLOOD PRESSURE: 147 MMHG | HEART RATE: 83 BPM | RESPIRATION RATE: 18 BRPM | OXYGEN SATURATION: 100 %

## 2023-08-20 LAB
ANION GAP SERPL CALC-SCNC: 11 MMOL/L — SIGNIFICANT CHANGE UP (ref 5–17)
APTT 50/50 2HOUR INCUB: SIGNIFICANT CHANGE UP (ref 24.5–36.6)
APTT BLD: 29.2 SEC — SIGNIFICANT CHANGE UP (ref 24.5–35.6)
APTT BLD: 29.2 SEC — SIGNIFICANT CHANGE UP (ref 24.5–35.6)
APTT BLD: SIGNIFICANT CHANGE UP (ref 24.5–36.6)
BASOPHILS # BLD AUTO: 0.01 K/UL — SIGNIFICANT CHANGE UP (ref 0–0.2)
BASOPHILS NFR BLD AUTO: 0.2 % — SIGNIFICANT CHANGE UP (ref 0–2)
BUN SERPL-MCNC: 38 MG/DL — HIGH (ref 7–23)
CA-I BLD-SCNC: 1.37 MMOL/L — HIGH (ref 1.15–1.33)
CALCIUM SERPL-MCNC: 9.8 MG/DL — SIGNIFICANT CHANGE UP (ref 8.4–10.5)
CHLORIDE SERPL-SCNC: 110 MMOL/L — HIGH (ref 96–108)
CO2 SERPL-SCNC: 19 MMOL/L — LOW (ref 22–31)
CREAT SERPL-MCNC: 1.79 MG/DL — HIGH (ref 0.5–1.3)
EGFR: 45 ML/MIN/1.73M2 — LOW
EOSINOPHIL # BLD AUTO: 0 K/UL — SIGNIFICANT CHANGE UP (ref 0–0.5)
EOSINOPHIL NFR BLD AUTO: 0 % — SIGNIFICANT CHANGE UP (ref 0–6)
FIBRINOGEN PPP-MCNC: 164 MG/DL — LOW (ref 200–445)
GLUCOSE BLDC GLUCOMTR-MCNC: 163 MG/DL — HIGH (ref 70–99)
GLUCOSE SERPL-MCNC: 148 MG/DL — HIGH (ref 70–99)
HCT VFR BLD CALC: 27 % — LOW (ref 39–50)
HGB BLD-MCNC: 8.3 G/DL — LOW (ref 13–17)
IMM GRANULOCYTES NFR BLD AUTO: 4.9 % — HIGH (ref 0–0.9)
INR BLD: 1.08 RATIO — SIGNIFICANT CHANGE UP (ref 0.85–1.18)
LYMPHOCYTES # BLD AUTO: 0.1 K/UL — LOW (ref 1–3.3)
LYMPHOCYTES # BLD AUTO: 2.4 % — LOW (ref 13–44)
MAGNESIUM SERPL-MCNC: 1.9 MG/DL — SIGNIFICANT CHANGE UP (ref 1.6–2.6)
MCHC RBC-ENTMCNC: 30.7 GM/DL — LOW (ref 32–36)
MCHC RBC-ENTMCNC: 31 PG — SIGNIFICANT CHANGE UP (ref 27–34)
MCV RBC AUTO: 100.7 FL — HIGH (ref 80–100)
MONOCYTES # BLD AUTO: 0.41 K/UL — SIGNIFICANT CHANGE UP (ref 0–0.9)
MONOCYTES NFR BLD AUTO: 10 % — SIGNIFICANT CHANGE UP (ref 2–14)
NEUTROPHILS # BLD AUTO: 3.37 K/UL — SIGNIFICANT CHANGE UP (ref 1.8–7.4)
NEUTROPHILS NFR BLD AUTO: 82.5 % — HIGH (ref 43–77)
NRBC # BLD: 0 /100 WBCS — SIGNIFICANT CHANGE UP (ref 0–0)
PAT CTL 2H: SIGNIFICANT CHANGE UP (ref 24.5–36.6)
PHOSPHATE SERPL-MCNC: 1.9 MG/DL — LOW (ref 2.5–4.5)
PLATELET # BLD AUTO: 142 K/UL — LOW (ref 150–400)
POTASSIUM SERPL-MCNC: 3.9 MMOL/L — SIGNIFICANT CHANGE UP (ref 3.5–5.3)
POTASSIUM SERPL-SCNC: 3.9 MMOL/L — SIGNIFICANT CHANGE UP (ref 3.5–5.3)
PROTHROM AB SERPL-ACNC: 11.9 SEC — SIGNIFICANT CHANGE UP (ref 9.5–13)
RBC # BLD: 2.68 M/UL — LOW (ref 4.2–5.8)
RBC # FLD: 18.8 % — HIGH (ref 10.3–14.5)
SODIUM SERPL-SCNC: 140 MMOL/L — SIGNIFICANT CHANGE UP (ref 135–145)
TACROLIMUS SERPL-MCNC: 10.7 NG/ML — SIGNIFICANT CHANGE UP
WBC # BLD: 4.09 K/UL — SIGNIFICANT CHANGE UP (ref 3.8–10.5)
WBC # FLD AUTO: 4.09 K/UL — SIGNIFICANT CHANGE UP (ref 3.8–10.5)

## 2023-08-20 PROCEDURE — 80048 BASIC METABOLIC PNL TOTAL CA: CPT

## 2023-08-20 PROCEDURE — 87186 SC STD MICRODIL/AGAR DIL: CPT

## 2023-08-20 PROCEDURE — 87040 BLOOD CULTURE FOR BACTERIA: CPT

## 2023-08-20 PROCEDURE — 84100 ASSAY OF PHOSPHORUS: CPT

## 2023-08-20 PROCEDURE — 88313 SPECIAL STAINS GROUP 2: CPT

## 2023-08-20 PROCEDURE — 76942 ECHO GUIDE FOR BIOPSY: CPT

## 2023-08-20 PROCEDURE — 88305 TISSUE EXAM BY PATHOLOGIST: CPT

## 2023-08-20 PROCEDURE — 85014 HEMATOCRIT: CPT

## 2023-08-20 PROCEDURE — 83880 ASSAY OF NATRIURETIC PEPTIDE: CPT

## 2023-08-20 PROCEDURE — 99285 EMERGENCY DEPT VISIT HI MDM: CPT | Mod: 25

## 2023-08-20 PROCEDURE — 83735 ASSAY OF MAGNESIUM: CPT

## 2023-08-20 PROCEDURE — 82330 ASSAY OF CALCIUM: CPT

## 2023-08-20 PROCEDURE — 82962 GLUCOSE BLOOD TEST: CPT

## 2023-08-20 PROCEDURE — 80180 DRUG SCRN QUAN MYCOPHENOLATE: CPT

## 2023-08-20 PROCEDURE — 76776 US EXAM K TRANSPL W/DOPPLER: CPT

## 2023-08-20 PROCEDURE — 83540 ASSAY OF IRON: CPT

## 2023-08-20 PROCEDURE — 36430 TRANSFUSION BLD/BLD COMPNT: CPT

## 2023-08-20 PROCEDURE — 74176 CT ABD & PELVIS W/O CONTRAST: CPT | Mod: MA

## 2023-08-20 PROCEDURE — 85730 THROMBOPLASTIN TIME PARTIAL: CPT

## 2023-08-20 PROCEDURE — 85385 FIBRINOGEN ANTIGEN: CPT

## 2023-08-20 PROCEDURE — 86850 RBC ANTIBODY SCREEN: CPT

## 2023-08-20 PROCEDURE — 87350 HEPATITIS BE AG IA: CPT

## 2023-08-20 PROCEDURE — P9040: CPT

## 2023-08-20 PROCEDURE — 71045 X-RAY EXAM CHEST 1 VIEW: CPT

## 2023-08-20 PROCEDURE — 85306 CLOT INHIBIT PROT S FREE: CPT

## 2023-08-20 PROCEDURE — 87077 CULTURE AEROBIC IDENTIFY: CPT

## 2023-08-20 PROCEDURE — 86901 BLOOD TYPING SEROLOGIC RH(D): CPT

## 2023-08-20 PROCEDURE — 99261: CPT

## 2023-08-20 PROCEDURE — 86705 HEP B CORE ANTIBODY IGM: CPT

## 2023-08-20 PROCEDURE — 85300 ANTITHROMBIN III ACTIVITY: CPT

## 2023-08-20 PROCEDURE — 84295 ASSAY OF SERUM SODIUM: CPT

## 2023-08-20 PROCEDURE — 85732 THROMBOPLASTIN TIME PARTIAL: CPT

## 2023-08-20 PROCEDURE — 84466 ASSAY OF TRANSFERRIN: CPT

## 2023-08-20 PROCEDURE — 82947 ASSAY GLUCOSE BLOOD QUANT: CPT

## 2023-08-20 PROCEDURE — P9059: CPT

## 2023-08-20 PROCEDURE — 85303 CLOT INHIBIT PROT C ACTIVITY: CPT

## 2023-08-20 PROCEDURE — 82435 ASSAY OF BLOOD CHLORIDE: CPT

## 2023-08-20 PROCEDURE — 36415 COLL VENOUS BLD VENIPUNCTURE: CPT

## 2023-08-20 PROCEDURE — P9041: CPT

## 2023-08-20 PROCEDURE — 82570 ASSAY OF URINE CREATININE: CPT

## 2023-08-20 PROCEDURE — 84132 ASSAY OF SERUM POTASSIUM: CPT

## 2023-08-20 PROCEDURE — 85613 RUSSELL VIPER VENOM DILUTED: CPT

## 2023-08-20 PROCEDURE — 86900 BLOOD TYPING SEROLOGIC ABO: CPT

## 2023-08-20 PROCEDURE — 93005 ELECTROCARDIOGRAM TRACING: CPT

## 2023-08-20 PROCEDURE — 84484 ASSAY OF TROPONIN QUANT: CPT

## 2023-08-20 PROCEDURE — 82803 BLOOD GASES ANY COMBINATION: CPT

## 2023-08-20 PROCEDURE — 93971 EXTREMITY STUDY: CPT

## 2023-08-20 PROCEDURE — 50200 RENAL BIOPSY PERQ: CPT

## 2023-08-20 PROCEDURE — 81001 URINALYSIS AUTO W/SCOPE: CPT

## 2023-08-20 PROCEDURE — 86923 COMPATIBILITY TEST ELECTRIC: CPT

## 2023-08-20 PROCEDURE — 85384 FIBRINOGEN ACTIVITY: CPT

## 2023-08-20 PROCEDURE — 86706 HEP B SURFACE ANTIBODY: CPT

## 2023-08-20 PROCEDURE — 88348 ELECTRON MICROSCOPY DX: CPT

## 2023-08-20 PROCEDURE — 36514 APHERESIS PLASMA: CPT

## 2023-08-20 PROCEDURE — 88346 IMFLUOR 1ST 1ANTB STAIN PX: CPT

## 2023-08-20 PROCEDURE — 87086 URINE CULTURE/COLONY COUNT: CPT

## 2023-08-20 PROCEDURE — 85610 PROTHROMBIN TIME: CPT

## 2023-08-20 PROCEDURE — 83605 ASSAY OF LACTIC ACID: CPT

## 2023-08-20 PROCEDURE — P9045: CPT

## 2023-08-20 PROCEDURE — 99233 SBSQ HOSP IP/OBS HIGH 50: CPT

## 2023-08-20 PROCEDURE — 80197 ASSAY OF TACROLIMUS: CPT

## 2023-08-20 PROCEDURE — 85025 COMPLETE CBC W/AUTO DIFF WBC: CPT

## 2023-08-20 PROCEDURE — 86147 CARDIOLIPIN ANTIBODY EA IG: CPT

## 2023-08-20 PROCEDURE — 87340 HEPATITIS B SURFACE AG IA: CPT

## 2023-08-20 PROCEDURE — 88350 IMFLUOR EA ADDL 1ANTB STN PX: CPT

## 2023-08-20 PROCEDURE — 83550 IRON BINDING TEST: CPT

## 2023-08-20 PROCEDURE — 85018 HEMOGLOBIN: CPT

## 2023-08-20 PROCEDURE — 82728 ASSAY OF FERRITIN: CPT

## 2023-08-20 PROCEDURE — 86707 HEPATITIS BE ANTIBODY: CPT

## 2023-08-20 PROCEDURE — 80053 COMPREHEN METABOLIC PANEL: CPT

## 2023-08-20 PROCEDURE — 86146 BETA-2 GLYCOPROTEIN ANTIBODY: CPT

## 2023-08-20 RX ORDER — TACROLIMUS 5 MG/1
5 CAPSULE ORAL
Qty: 0 | Refills: 0 | DISCHARGE
Start: 2023-08-20

## 2023-08-20 RX ORDER — APIXABAN 2.5 MG/1
1 TABLET, FILM COATED ORAL
Qty: 60 | Refills: 0
Start: 2023-08-20

## 2023-08-20 RX ORDER — SODIUM,POTASSIUM PHOSPHATES 278-250MG
1 POWDER IN PACKET (EA) ORAL
Refills: 0 | Status: DISCONTINUED | OUTPATIENT
Start: 2023-08-20 | End: 2023-08-20

## 2023-08-20 RX ADMIN — Medication 500000 UNIT(S): at 05:53

## 2023-08-20 RX ADMIN — Medication 81 MILLIGRAM(S): at 11:19

## 2023-08-20 RX ADMIN — MYCOPHENOLATE MOFETIL 1000 MILLIGRAM(S): 250 CAPSULE ORAL at 09:07

## 2023-08-20 RX ADMIN — Medication 2: at 09:08

## 2023-08-20 RX ADMIN — Medication 1 TABLET(S): at 11:23

## 2023-08-20 RX ADMIN — Medication 500000 UNIT(S): at 11:20

## 2023-08-20 RX ADMIN — Medication 10 UNIT(S): at 09:08

## 2023-08-20 RX ADMIN — FAMOTIDINE 20 MILLIGRAM(S): 10 INJECTION INTRAVENOUS at 11:19

## 2023-08-20 RX ADMIN — CARVEDILOL PHOSPHATE 3.12 MILLIGRAM(S): 80 CAPSULE, EXTENDED RELEASE ORAL at 05:54

## 2023-08-20 RX ADMIN — APIXABAN 5 MILLIGRAM(S): 2.5 TABLET, FILM COATED ORAL at 09:06

## 2023-08-20 RX ADMIN — Medication 1 TABLET(S): at 11:20

## 2023-08-20 RX ADMIN — Medication 20 MILLIGRAM(S): at 05:54

## 2023-08-20 RX ADMIN — TACROLIMUS 5 MILLIGRAM(S): 5 CAPSULE ORAL at 09:07

## 2023-08-20 NOTE — PROGRESS NOTE ADULT - SUBJECTIVE AND OBJECTIVE BOX
[facility] DIVISION OF KIDNEY DISEASES AND HYPERTENSION -- FOLLOW UP NOTE  --------------------------------------------------------------------------------  Authored by: Nba Aguilar   Cell # [phone]     ROSA CONDE was seen and examined at bedside.   Patient is a 53y old  Male who presents with a chief complaint of UTI, fecal impaction (08-20-23)      24 hour events/subjective: Received IVIG #5 yesterday Was not discharged yesterday b/c he LUE ultrasound + for DVT. Seen by vascular and heme. Started on Eliqus. LUE wrapped in ACE bandage.   No new concerns today but frustrated about the delay in his discharge.       Standing Inpatient Medications  apixaban 5 milliGRAM(s) Oral every 12 hours  aspirin enteric coated 81 milliGRAM(s) Oral daily  atorvastatin 40 milliGRAM(s) Oral at bedtime  bisacodyl 5 milliGRAM(s) Oral at bedtime  carvedilol 3.125 milliGRAM(s) Oral every 12 hours  famotidine    Tablet 20 milliGRAM(s) Oral daily  insulin glargine Injectable (LANTUS) 10 Unit(s) SubCutaneous at bedtime  insulin lispro (ADMELOG) corrective regimen sliding scale   SubCutaneous three times a day before meals  insulin lispro (ADMELOG) corrective regimen sliding scale   SubCutaneous at bedtime  insulin lispro Injectable (ADMELOG) 10 Unit(s) SubCutaneous three times a day before meals  mycophenolate mofetil 1000 milliGRAM(s) Oral two times a day  nystatin    Suspension 726376 Unit(s) Oral four times a day  potassium phosphate / sodium phosphate Tablet (K-PHOS No. 2) 1 Tablet(s) Oral three times a day with meals  predniSONE   Tablet 20 milliGRAM(s) Oral daily  senna 2 Tablet(s) Oral at bedtime  tacrolimus ER Tablet (ENVARSUS XR) 5 milliGRAM(s) Oral <User Schedule>  trimethoprim   80 mG/sulfamethoxazole 400 mG 1 Tablet(s) Oral daily  valGANciclovir 450 milliGRAM(s) Oral <User Schedule>    PRN Inpatient Medications  dextrose Oral Gel 15 Gram(s) Oral once PRN      VITALS/PHYSICAL EXAM  --------------------------------------------------------------------------------  T(C): 36.6 (08-20-23 @ 08:23), Max: 37.1 (08-19-23 @ 13:54)  HR: 83 (08-20-23 @ 08:23) (74 - 90)  BP: 147/73 (08-20-23 @ 08:23) (112/72 - 149/74)  RR: 18 (08-20-23 @ 08:23) (17 - 18)  SpO2: 100% (08-20-23 @ 08:23) (99% - 100%)        08-19-23 @ 07:01  -  08-20-23 @ 07:00  --------------------------------------------------------  IN: 985 mL / OUT: 1425 mL / NET: -440 mL      Physical Exam:  Awake and alert  Comfortable   LUE diffuse swelling + ACE bandage. AVF + thrill  Abdomen obese, soft non tender  No LE edema       LABS/STUDIES  --------------------------------------------------------------------------------              8.3    4.09  >-----------<  142      [08-20-23 @ 06:54]              27.0     140  |  110  |  38  ----------------------------<  148      [08-20-23 @ 06:58]  3.9   |  19  |  1.79        Ca     9.8     [08-20-23 @ 06:58]      iCa    1.37     [08-20 @ 06:53]      Mg     1.9     [08-20-23 @ 06:58]      Phos  1.9     [08-20-23 @ 06:58]      PT/INR: PT 11.9 , INR 1.08       [08-20-23 @ 06:57]  PTT: 29.2       [08-20-23 @ 06:57]      Creatinine Trend:  SCr 1.79 [08-20 @ 06:58]  SCr 1.82 [08-19 @ 06:58]  SCr 1.88 [08-18 @ 05:41]  SCr 2.10 [08-17 @ 07:08]  SCr 2.12 [08-16 @ 06:38]    Tacrolimus (), Serum: 10.7 ng/mL (08-20 @ 06:53)  Tacrolimus (), Serum: 10.6 ng/mL (08-19 @ 07:02)  Tacrolimus (), Serum: 10.4 ng/mL (08-18 @ 05:41)  Tacrolimus (), Serum: 8.6 ng/mL (08-17 @ 07:08)      CAPILLARY BLOOD GLUCOSE  POCT Blood Glucose.: 163 mg/dL (20 Aug 2023 08:26)  POCT Blood Glucose.: 174 mg/dL (19 Aug 2023 20:53)  POCT Blood Glucose.: 161 mg/dL (19 Aug 2023 17:46)  POCT Blood Glucose.: 152 mg/dL (19 Aug 2023 12:21)      Urinalysis - [08-20-23 @ 06:58]      Color  / Appearance  / SG  / pH       Gluc 148 / Ketone   / Bili  / Urobili        Blood  / Protein  / Leuk Est  / Nitrite       RBC  / WBC  / Hyaline  / Gran  / Sq Epi  / Non Sq Epi  / Bacteria       Iron 68, TIBC 185, %sat 37      [08-16-23 @ 06:39]  Ferritin 1010      [08-16-23 @ 06:39]    HBsAb Reactive      [08-17-23 @ 11:28]  HBsAg Nonreact      [08-17-23 @ 11:28]

## 2023-08-20 NOTE — DISCHARGE NOTE NURSING/CASE MANAGEMENT/SOCIAL WORK - NSDCPEFALRISK_GEN_ALL_CORE
For information on Fall & Injury Prevention, visit: [email or web address] OR  [email or web address] OR  https://www.cdc.gov/steadi/patient.html

## 2023-08-20 NOTE — PROGRESS NOTE ADULT - SUBJECTIVE AND OBJECTIVE BOX
Progress Note:   · Provider Specialty	Transplant Surgery    Reason for Admission:    Reason for Admission:  · Reason for Admission	UTI, fecal impaction      · Subjective and Objective:   Transplant Surgery - Multidisciplinary Progress Note  --------------------------------------------------------------  DDRT 7/16/2023    Present:   Patient seen and examined with multidisciplinary team including Transplant Surgeon: Dr. Delacruz. Transplant Nephrologist: Dr. Lauren Lovett and unit RN during am rounds.  Disciplines not in attendance will be notified of the plan.     HPI: Tee Salvador is a 52 y/o M with past medical history significant for HTN, CHF (s/p AICD 2016--last interrogated 7/15/23), CAD (s/p CABG 2015), IDDM, PVD s/p stent x4 in RLE with R big toe/second toe amputation (2021) and ESRD on HD via LUE AVF for 6 years now s/p DDRT 7/16/2023 with Thymoglobulin induction. Post-transplant course complicated by severe constipation requiring disimpaction, DGF requiring hemodialysis and LUE swelling with concern for L subclavian thrombus, now s/p IR fistulogram 7/7/23 with balloon angioplasty of subclavian vein. He was discharged home on 7/27/23 and remained on HD MWF at home center [facility]. He presents from home via EMS to [facility] on 8/7/23 with constipation and continued DGF.     Interval Events:  - c/o LUE swelling. US Doppler with persistent Left SCV thrombosis  - bx with concern for AMR, Completed PLEX/IVIG 5 sessions + Rituximab  - Denies any Abdominal Pain.  - Have BM, passing gas  - Graft function, improving. Cr: 1.79  - UOP: 1425ML  -  VSS, Afebrile.   - Pt denies CP/SOB, fever/chills, N/V.     Immunosuppression:  Envarsus 6 mg  MMF 1/1  pulse steroids with taper    Potential Discharge date: TBD    Education:  Medications    Plan of care:  See Below      PAST MEDICAL & SURGICAL HISTORY:  Diabetes mellitus  type 2      Foot ulcer due to secondary DM      Coronary artery disease      Acute on chronic systolic heart failure      H/O kidney transplant      Breast Reduction  at age 18      Toe amputation status, left      S/P CABG (coronary artery bypass graft)      AICD (automatic cardioverter/defibrillator) present    MEDICATIONS  (STANDING):  apixaban 5 milliGRAM(s) Oral every 12 hours  aspirin enteric coated 81 milliGRAM(s) Oral daily  atorvastatin 40 milliGRAM(s) Oral at bedtime  bisacodyl 5 milliGRAM(s) Oral at bedtime  carvedilol 3.125 milliGRAM(s) Oral every 12 hours  dextrose 5%. 1000 milliLiter(s) (50 mL/Hr) IV Continuous <Continuous>  dextrose 5%. 1000 milliLiter(s) (100 mL/Hr) IV Continuous <Continuous>  dextrose 50% Injectable 25 Gram(s) IV Push once  dextrose 50% Injectable 12.5 Gram(s) IV Push once  dextrose 50% Injectable 25 Gram(s) IV Push once  famotidine    Tablet 20 milliGRAM(s) Oral daily  glucagon  Injectable 1 milliGRAM(s) IntraMuscular once  insulin glargine Injectable (LANTUS) 10 Unit(s) SubCutaneous at bedtime  insulin lispro (ADMELOG) corrective regimen sliding scale   SubCutaneous three times a day before meals  insulin lispro (ADMELOG) corrective regimen sliding scale   SubCutaneous at bedtime  insulin lispro Injectable (ADMELOG) 10 Unit(s) SubCutaneous three times a day before meals  mycophenolate mofetil 1000 milliGRAM(s) Oral two times a day  nystatin    Suspension 238539 Unit(s) Oral four times a day  potassium phosphate / sodium phosphate Tablet (K-PHOS No. 2) 1 Tablet(s) Oral three times a day with meals  predniSONE   Tablet 20 milliGRAM(s) Oral daily  senna 2 Tablet(s) Oral at bedtime  tacrolimus ER Tablet (ENVARSUS XR) 5 milliGRAM(s) Oral <User Schedule>  trimethoprim   80 mG/sulfamethoxazole 400 mG 1 Tablet(s) Oral daily  valGANciclovir 450 milliGRAM(s) Oral <User Schedule>    MEDICATIONS  (PRN):  dextrose Oral Gel 15 Gram(s) Oral once PRN Blood Glucose LESS THAN 70 milliGRAM(s)/deciliter      PAST MEDICAL & SURGICAL HISTORY:  Diabetes mellitus  type 2      Foot ulcer due to secondary DM      Coronary artery disease      Acute on chronic systolic heart failure      H/O kidney transplant      Breast Reduction  at age 18      Toe amputation status, left      S/P CABG (coronary artery bypass graft)      AICD (automatic cardioverter/defibrillator) present          Vital Signs Last 24 Hrs  T(C): 36.6 (20 Aug 2023 08:23), Max: 37.1 (19 Aug 2023 13:54)  T(F): 97.8 (20 Aug 2023 08:23), Max: 98.8 (19 Aug 2023 18:21)  HR: 83 (20 Aug 2023 08:23) (74 - 90)  BP: 147/73 (20 Aug 2023 08:23) (112/72 - 149/74)  BP(mean): --  RR: 18 (20 Aug 2023 08:23) (17 - 18)  SpO2: 100% (20 Aug 2023 08:23) (99% - 100%)    Parameters below as of 20 Aug 2023 08:23  Patient On (Oxygen Delivery Method): room air        I&O's Summary    19 Aug 2023 07:01  -  20 Aug 2023 07:00  --------------------------------------------------------  IN: 985 mL / OUT: 1425 mL / NET: -440 mL                              8.3    4.09  )-----------( 142      ( 20 Aug 2023 06:54 )             27.0     08-20    140  |  110<H>  |  38<H>  ----------------------------<  148<H>  3.9   |  19<L>  |  1.79<H>    Ca    9.8      20 Aug 2023 06:58  Phos  1.9     08-20  Mg     1.9     08-20      Tacrolimus (), Serum: 10.7 ng/mL (08-20 @ 06:53)      MEDICATIONS  (STANDING):  apixaban 5 milliGRAM(s) Oral every 12 hours  aspirin enteric coated 81 milliGRAM(s) Oral daily  atorvastatin 40 milliGRAM(s) Oral at bedtime  bisacodyl 5 milliGRAM(s) Oral at bedtime  carvedilol 3.125 milliGRAM(s) Oral every 12 hours  dextrose 5%. 1000 milliLiter(s) (50 mL/Hr) IV Continuous <Continuous>  dextrose 5%. 1000 milliLiter(s) (100 mL/Hr) IV Continuous <Continuous>  dextrose 50% Injectable 12.5 Gram(s) IV Push once  dextrose 50% Injectable 25 Gram(s) IV Push once  dextrose 50% Injectable 25 Gram(s) IV Push once  famotidine    Tablet 20 milliGRAM(s) Oral daily  glucagon  Injectable 1 milliGRAM(s) IntraMuscular once  insulin glargine Injectable (LANTUS) 10 Unit(s) SubCutaneous at bedtime  insulin lispro (ADMELOG) corrective regimen sliding scale   SubCutaneous at bedtime  insulin lispro (ADMELOG) corrective regimen sliding scale   SubCutaneous three times a day before meals  insulin lispro Injectable (ADMELOG) 10 Unit(s) SubCutaneous three times a day before meals  mycophenolate mofetil 1000 milliGRAM(s) Oral two times a day  nystatin    Suspension 371605 Unit(s) Oral four times a day  potassium phosphate / sodium phosphate Tablet (K-PHOS No. 2) 1 Tablet(s) Oral three times a day with meals  predniSONE   Tablet 20 milliGRAM(s) Oral daily  senna 2 Tablet(s) Oral at bedtime  tacrolimus ER Tablet (ENVARSUS XR) 5 milliGRAM(s) Oral <User Schedule>  trimethoprim   80 mG/sulfamethoxazole 400 mG 1 Tablet(s) Oral daily  valGANciclovir 450 milliGRAM(s) Oral <User Schedule>    MEDICATIONS  (PRN):  dextrose Oral Gel 15 Gram(s) Oral once PRN Blood Glucose LESS THAN 70 milliGRAM(s)/deciliter      PAST MEDICAL & SURGICAL HISTORY:  Diabetes mellitus  type 2      Foot ulcer due to secondary DM      Coronary artery disease      Acute on chronic systolic heart failure      H/O kidney transplant      Breast Reduction  at age 18      Toe amputation status, left      S/P CABG (coronary artery bypass graft)      AICD (automatic cardioverter/defibrillator) present          Vital Signs Last 24 Hrs  T(C): 36.6 (20 Aug 2023 08:23), Max: 37.1 (19 Aug 2023 13:54)  T(F): 97.8 (20 Aug 2023 08:23), Max: 98.8 (19 Aug 2023 18:21)  HR: 83 (20 Aug 2023 08:23) (74 - 90)  BP: 147/73 (20 Aug 2023 08:23) (112/72 - 149/74)  BP(mean): --  RR: 18 (20 Aug 2023 08:23) (17 - 18)  SpO2: 100% (20 Aug 2023 08:23) (99% - 100%)    Parameters below as of 20 Aug 2023 08:23  Patient On (Oxygen Delivery Method): room air        I&O's Summary    19 Aug 2023 07:01  -  20 Aug 2023 07:00  --------------------------------------------------------  IN: 985 mL / OUT: 1425 mL / NET: -440 mL                              8.3    4.09  )-----------( 142      ( 20 Aug 2023 06:54 )             27.0     08-20    140  |  110<H>  |  38<H>  ----------------------------<  148<H>  3.9   |  19<L>  |  1.79<H>    Ca    9.8      20 Aug 2023 06:58  Phos  1.9     08-20  Mg     1.9     08-20      Tacrolimus (), Serum: 10.7 ng/mL (08-20 @ 06:53)        Culture - Urine (collected 08-14-23 @ 11:24)  Source: Clean Catch Clean Catch (Midstream)  Final Report (08-15-23 @ 13:09):    <10,000 CFU/mL Normal Urogenital Alisia                            Review of systems  Gen: No weight changes, fatigue, fevers/chills, weakness  Skin: No rashes  Head/Eyes/Ears/Mouth: No headache; Normal hearing; Normal vision w/o blurriness; No sinus pain/discomfort, sore throat  Respiratory: No dyspnea, cough, wheezing, hemoptysis  CV: No chest pain, PND, orthopnea  GI: no abdominal pain, denies diarrhea, constipation, nausea, vomiting, melena, hematochezia  : No increased frequency, dysuria, hematuria, nocturia  MSK: No joint pain/swelling; no back pain; no edema  Neuro: No dizziness/lightheadedness, weakness, seizures, numbness, tingling  Heme: No easy bruising or bleeding  Endo: No heat/cold intolerance  Psych: No significant nervousness, anxiety, stress, depression  All other systems were reviewed and are negative, except as noted.     PHYSICAL EXAM:  Constitutional: Well developed / well nourished  Eyes: Anicteric, PERRLA  ENMT: nc/at  Neck: supple  Respiratory: CTA B/L  Cardiovascular: RRR  Gastrointestinal: Soft, ND/NT. RLQ incision with some staples still in place.   Genitourinary: Voiding spontaneously  Extremities: SCD's in place and working bilaterally  Vascular: Palpable dp pulses bilaterally  Neurological: A&O x3  Skin: no rashes, ulcerations or lesions;  Musculoskeletal: Moving all extremities  Psychiatric: Responsive       Assessment and Plan:   · Assessment	  Tee Salvador is a 52 y/o M with past medical history significant for HTN, CHF (s/p AICD 2016--last interrogated 7/15/23), CAD (s/p CABG 2015), IDDM, PVD s/p stent x4 in RLE with R big toe/second toe amputation (2021) and ESRD on HD via LUE AVF for 6 years now s/p DDRT 7/16/2023 with Thymoglobulin induction. Post-transplant course complicated by severe constipation requiring disimpaction, DGF requiring hemodialysis and LUE swelling with concern for L subclavian thrombus, now s/p IR fistulogram 7/7/23 with balloon angioplasty of subclavian vein. He was discharged home on 7/27/23 and remained on HD MWF at home center [facility]. He presents from home via EMS to [facility] on 8/7/23 with constipation and continued DGF    [ ] DGF s/p DDRT 7/16/23  -S/P IR biopsy on 8/10 with concern for AMR. will continue PLEX/IVIG x 5 cycles.  Rituximab completed yesterday.   -DSA+ : will continue pulse dose steroids with taper (500 on 8/8, 250 on 8/9, 125 on 8/10, 60 on 8/12).  Prednisone 20 mg daily  - DSA labs sent today 8/16.   -graft function improving, Cr: 1. 79   -last HD on 8/7, furosemide on Hold.   -.   -BCx negative  -bowel regimen  -diet as tolerated  -SCDs  -epogen today, stable h/h. continue ASA added Eliquis for Left SCV thrombosis. Hypercoagulability work up initiated by HemOnc Team, Vascular team following     [ ] Immunosuppression  -Envarsus per level  -MMF 1/1  -steroids as above  -PPx regimen: Valcyte/Bactrim/Nystatin/PPI with renal dosing    [ ] DM  -Lantus/Lispro adjust while on steroids.    -Dispo: Discharge today provided Eliquis can be obtain for him on pharmacy    # [phone]  Transplant Team       Transplant Surgery - Multidisciplinary Progress Note  --------------------------------------------------------------  DDRT 7/16/2023    Present:   Patient seen and examined with multidisciplinary team including Transplant Surgeon: Dr. Delacruz. Transplant Nephrologist: Dr. Lauren Lovett and unit RN Jonathan during am rounds.  Disciplines not in attendance will be notified of the plan.     HPI: Tee Salvador is a 52 y/o M with past medical history significant for HTN, CHF (s/p AICD 2016--last interrogated 7/15/23), CAD (s/p CABG 2015), IDDM, PVD s/p stent x4 in RLE with R big toe/second toe amputation (2021) and ESRD on HD via LUE AVF for 6 years now s/p DDRT 7/16/2023 with Thymoglobulin induction. Post-transplant course complicated by severe constipation requiring disimpaction, DGF requiring hemodialysis and LUE swelling with concern for L subclavian thrombus, now s/p IR fistulogram 7/7/23 with balloon angioplasty of subclavian vein. He was discharged home on 7/27/23 and remained on HD MWF at home center [facility]. He presents from home via EMS to [facility] on 8/7/23 with constipation and continued DGF.     Interval Events:  - c/o LUE swelling. US Doppler with persistent Left SCV thrombosis  - bx with concern for AMR, Completed PLEX/IVIG 5 sessions + Rituximab  - Denies any Abdominal Pain.  - Have BM, passing gas  - Graft function, improving. Cr: 1.79  - UOP: 1425ML  -  VSS, Afebrile.   - Pt denies CP/SOB, fever/chills, N/V.     Immunosuppression:  Envarsus 5 mg  MMF 1/1  s/p pulse steroids on prednisone 20 mg daily    Potential Discharge date: today 8/20    Education:  Medications    Plan of care:  See Below      PAST MEDICAL & SURGICAL HISTORY:  Diabetes mellitus  type 2      Foot ulcer due to secondary DM      Coronary artery disease      Acute on chronic systolic heart failure      H/O kidney transplant      Breast Reduction  at age 18      Toe amputation status, left      S/P CABG (coronary artery bypass graft)      AICD (automatic cardioverter/defibrillator) present    MEDICATIONS  (STANDING):  apixaban 5 milliGRAM(s) Oral every 12 hours  aspirin enteric coated 81 milliGRAM(s) Oral daily  atorvastatin 40 milliGRAM(s) Oral at bedtime  bisacodyl 5 milliGRAM(s) Oral at bedtime  carvedilol 3.125 milliGRAM(s) Oral every 12 hours  dextrose 5%. 1000 milliLiter(s) (50 mL/Hr) IV Continuous <Continuous>  dextrose 5%. 1000 milliLiter(s) (100 mL/Hr) IV Continuous <Continuous>  dextrose 50% Injectable 25 Gram(s) IV Push once  dextrose 50% Injectable 12.5 Gram(s) IV Push once  dextrose 50% Injectable 25 Gram(s) IV Push once  famotidine    Tablet 20 milliGRAM(s) Oral daily  glucagon  Injectable 1 milliGRAM(s) IntraMuscular once  insulin glargine Injectable (LANTUS) 10 Unit(s) SubCutaneous at bedtime  insulin lispro (ADMELOG) corrective regimen sliding scale   SubCutaneous three times a day before meals  insulin lispro (ADMELOG) corrective regimen sliding scale   SubCutaneous at bedtime  insulin lispro Injectable (ADMELOG) 10 Unit(s) SubCutaneous three times a day before meals  mycophenolate mofetil 1000 milliGRAM(s) Oral two times a day  nystatin    Suspension 270366 Unit(s) Oral four times a day  potassium phosphate / sodium phosphate Tablet (K-PHOS No. 2) 1 Tablet(s) Oral three times a day with meals  predniSONE   Tablet 20 milliGRAM(s) Oral daily  senna 2 Tablet(s) Oral at bedtime  tacrolimus ER Tablet (ENVARSUS XR) 5 milliGRAM(s) Oral <User Schedule>  trimethoprim   80 mG/sulfamethoxazole 400 mG 1 Tablet(s) Oral daily  valGANciclovir 450 milliGRAM(s) Oral <User Schedule>    MEDICATIONS  (PRN):  dextrose Oral Gel 15 Gram(s) Oral once PRN Blood Glucose LESS THAN 70 milliGRAM(s)/deciliter      PAST MEDICAL & SURGICAL HISTORY:  Diabetes mellitus  type 2      Foot ulcer due to secondary DM      Coronary artery disease      Acute on chronic systolic heart failure      H/O kidney transplant      Breast Reduction  at age 18      Toe amputation status, left      S/P CABG (coronary artery bypass graft)      AICD (automatic cardioverter/defibrillator) present          Vital Signs Last 24 Hrs  T(C): 36.6 (20 Aug 2023 08:23), Max: 37.1 (19 Aug 2023 13:54)  T(F): 97.8 (20 Aug 2023 08:23), Max: 98.8 (19 Aug 2023 18:21)  HR: 83 (20 Aug 2023 08:23) (74 - 90)  BP: 147/73 (20 Aug 2023 08:23) (112/72 - 149/74)  BP(mean): --  RR: 18 (20 Aug 2023 08:23) (17 - 18)  SpO2: 100% (20 Aug 2023 08:23) (99% - 100%)    Parameters below as of 20 Aug 2023 08:23  Patient On (Oxygen Delivery Method): room air        I&O's Summary    19 Aug 2023 07:01  -  20 Aug 2023 07:00  --------------------------------------------------------  IN: 985 mL / OUT: 1425 mL / NET: -440 mL                              8.3    4.09  )-----------( 142      ( 20 Aug 2023 06:54 )             27.0     08-20    140  |  110<H>  |  38<H>  ----------------------------<  148<H>  3.9   |  19<L>  |  1.79<H>    Ca    9.8      20 Aug 2023 06:58  Phos  1.9     08-20  Mg     1.9     08-20      Tacrolimus (), Serum: 10.7 ng/mL (08-20 @ 06:53)      MEDICATIONS  (STANDING):  apixaban 5 milliGRAM(s) Oral every 12 hours  aspirin enteric coated 81 milliGRAM(s) Oral daily  atorvastatin 40 milliGRAM(s) Oral at bedtime  bisacodyl 5 milliGRAM(s) Oral at bedtime  carvedilol 3.125 milliGRAM(s) Oral every 12 hours  dextrose 5%. 1000 milliLiter(s) (50 mL/Hr) IV Continuous <Continuous>  dextrose 5%. 1000 milliLiter(s) (100 mL/Hr) IV Continuous <Continuous>  dextrose 50% Injectable 12.5 Gram(s) IV Push once  dextrose 50% Injectable 25 Gram(s) IV Push once  dextrose 50% Injectable 25 Gram(s) IV Push once  famotidine    Tablet 20 milliGRAM(s) Oral daily  glucagon  Injectable 1 milliGRAM(s) IntraMuscular once  insulin glargine Injectable (LANTUS) 10 Unit(s) SubCutaneous at bedtime  insulin lispro (ADMELOG) corrective regimen sliding scale   SubCutaneous at bedtime  insulin lispro (ADMELOG) corrective regimen sliding scale   SubCutaneous three times a day before meals  insulin lispro Injectable (ADMELOG) 10 Unit(s) SubCutaneous three times a day before meals  mycophenolate mofetil 1000 milliGRAM(s) Oral two times a day  nystatin    Suspension 359848 Unit(s) Oral four times a day  potassium phosphate / sodium phosphate Tablet (K-PHOS No. 2) 1 Tablet(s) Oral three times a day with meals  predniSONE   Tablet 20 milliGRAM(s) Oral daily  senna 2 Tablet(s) Oral at bedtime  tacrolimus ER Tablet (ENVARSUS XR) 5 milliGRAM(s) Oral <User Schedule>  trimethoprim   80 mG/sulfamethoxazole 400 mG 1 Tablet(s) Oral daily  valGANciclovir 450 milliGRAM(s) Oral <User Schedule>    MEDICATIONS  (PRN):  dextrose Oral Gel 15 Gram(s) Oral once PRN Blood Glucose LESS THAN 70 milliGRAM(s)/deciliter      PAST MEDICAL & SURGICAL HISTORY:  Diabetes mellitus  type 2      Foot ulcer due to secondary DM      Coronary artery disease      Acute on chronic systolic heart failure      H/O kidney transplant      Breast Reduction  at age 18      Toe amputation status, left      S/P CABG (coronary artery bypass graft)      AICD (automatic cardioverter/defibrillator) present          Vital Signs Last 24 Hrs  T(C): 36.6 (20 Aug 2023 08:23), Max: 37.1 (19 Aug 2023 13:54)  T(F): 97.8 (20 Aug 2023 08:23), Max: 98.8 (19 Aug 2023 18:21)  HR: 83 (20 Aug 2023 08:23) (74 - 90)  BP: 147/73 (20 Aug 2023 08:23) (112/72 - 149/74)  BP(mean): --  RR: 18 (20 Aug 2023 08:23) (17 - 18)  SpO2: 100% (20 Aug 2023 08:23) (99% - 100%)    Parameters below as of 20 Aug 2023 08:23  Patient On (Oxygen Delivery Method): room air        I&O's Summary    19 Aug 2023 07:01  -  20 Aug 2023 07:00  --------------------------------------------------------  IN: 985 mL / OUT: 1425 mL / NET: -440 mL                              8.3    4.09  )-----------( 142      ( 20 Aug 2023 06:54 )             27.0     08-20    140  |  110<H>  |  38<H>  ----------------------------<  148<H>  3.9   |  19<L>  |  1.79<H>    Ca    9.8      20 Aug 2023 06:58  Phos  1.9     08-20  Mg     1.9     08-20      Tacrolimus (), Serum: 10.7 ng/mL (08-20 @ 06:53)        Culture - Urine (collected 08-14-23 @ 11:24)  Source: Clean Catch Clean Catch (Midstream)  Final Report (08-15-23 @ 13:09):    <10,000 CFU/mL Normal Urogenital Alisia                            Review of systems  Gen: No weight changes, fatigue, fevers/chills, weakness  Skin: No rashes  Head/Eyes/Ears/Mouth: No headache; Normal hearing; Normal vision w/o blurriness; No sinus pain/discomfort, sore throat  Respiratory: No dyspnea, cough, wheezing, hemoptysis  CV: No chest pain, PND, orthopnea  GI: no abdominal pain, denies diarrhea, constipation, nausea, vomiting, melena, hematochezia  : No increased frequency, dysuria, hematuria, nocturia  MSK: No joint pain/swelling; no back pain; no edema  Neuro: No dizziness/lightheadedness, weakness, seizures, numbness, tingling  Heme: No easy bruising or bleeding  Endo: No heat/cold intolerance  Psych: No significant nervousness, anxiety, stress, depression  All other systems were reviewed and are negative, except as noted.     PHYSICAL EXAM:  Constitutional: Well developed / well nourished  Eyes: Anicteric, PERRLA  ENMT: nc/at  Neck: supple  Respiratory: CTA B/L  Cardiovascular: RRR  Gastrointestinal: Soft, ND/NT. RLQ incision with some staples still in place.   Genitourinary: Voiding spontaneously  Extremities: SCD's in place and working bilaterally  Vascular: Palpable dp pulses bilaterally  Neurological: A&O x3  Skin: no rashes, ulcerations or lesions;  Musculoskeletal: Moving all extremities  Psychiatric: Responsive

## 2023-08-20 NOTE — PROGRESS NOTE ADULT - ASSESSMENT
52 y/o M with PMH of HTN, CHF (s/p AICD 2016), CAD (s/p CABG 2015), IDDM, PVD s/p stent x4 in RLE with R big toe/second toe amputation (2021) and ESRD on HD via LUE AVF for 6 years now s/p DDRT 7/16/2023 with Thymoglobulin induction. Post-transplant course complicated by severe constipation requiring disimpaction, DGF requiring hemodialysis and LUE swelling with concern for L subclavian thrombus, now s/p IR fistulogram 7/7/23 with balloon angioplasty of subclavian vein. Discharged home on 7/27/23 and remained on HD MWF. He presents from home via EMS to [facility] on 8/7/23 with abdominal discomfort and no bowel movement for 3 days. Vascular team consulted for LUE swelling, s/p L radiocephalic fistulagram and subclavian vein angioplasty (7/24). LUE Duplex (8/19) demonstrated persistent DVT in L subclavian vein and SVT of cephalic vein in mid forearm.      Reccommendations:  - ACE wrap LUE and elevate  - Vascular will continue to follow  - Appreciate Hematology recs    Vascular Surgery  [id number]

## 2023-08-20 NOTE — PROGRESS NOTE ADULT - REASON FOR ADMISSION
UTI, fecal impaction

## 2023-08-20 NOTE — PROGRESS NOTE ADULT - SUBJECTIVE AND OBJECTIVE BOX
[facility]   MD DANIEL WALDEN MD KRISTINE SOLTANPOUR, DO ANGELA WONG, PA    TEL:  OFFICE: [phone]  From 5pm-7am Answering Service [phone]    -- RENAL FOLLOW UP NOTE ---Date of Service 08-20-23 @ 16:58    Patient is a 53y old  Male who presents with a chief complaint of UTI, fecal impaction (20 Aug 2023 12:13)      Patient seen and examined at bedside. No chest pain/sob    VITALS:  T(F): 97.8 (08-20-23 @ 08:23), Max: 98.8 (08-19-23 @ 18:21)  HR: 83 (08-20-23 @ 08:23)  BP: 147/73 (08-20-23 @ 08:23)  RR: 18 (08-20-23 @ 08:23)  SpO2: 100% (08-20-23 @ 08:23)  Wt(kg): --    08-19 @ 07:01  -  08-20 @ 07:00  --------------------------------------------------------  IN: 985 mL / OUT: 1425 mL / NET: -440 mL          PHYSICAL EXAM:  General: NAD  Neck: No JVD  Respiratory: CTAB, no wheezes, rales or rhonchi  Cardiovascular: S1, S2, RRR  Gastrointestinal: BS+, soft, NT/ND  Extremities: No peripheral edema    Hospital Medications:   MEDICATIONS  (STANDING):  apixaban 5 milliGRAM(s) Oral every 12 hours  aspirin enteric coated 81 milliGRAM(s) Oral daily  atorvastatin 40 milliGRAM(s) Oral at bedtime  bisacodyl 5 milliGRAM(s) Oral at bedtime  carvedilol 3.125 milliGRAM(s) Oral every 12 hours  dextrose 5%. 1000 milliLiter(s) (50 mL/Hr) IV Continuous <Continuous>  dextrose 5%. 1000 milliLiter(s) (100 mL/Hr) IV Continuous <Continuous>  dextrose 50% Injectable 12.5 Gram(s) IV Push once  dextrose 50% Injectable 25 Gram(s) IV Push once  dextrose 50% Injectable 25 Gram(s) IV Push once  famotidine    Tablet 20 milliGRAM(s) Oral daily  glucagon  Injectable 1 milliGRAM(s) IntraMuscular once  insulin glargine Injectable (LANTUS) 10 Unit(s) SubCutaneous at bedtime  insulin lispro (ADMELOG) corrective regimen sliding scale   SubCutaneous three times a day before meals  insulin lispro (ADMELOG) corrective regimen sliding scale   SubCutaneous at bedtime  insulin lispro Injectable (ADMELOG) 10 Unit(s) SubCutaneous three times a day before meals  mycophenolate mofetil 1000 milliGRAM(s) Oral two times a day  nystatin    Suspension 614678 Unit(s) Oral four times a day  potassium phosphate / sodium phosphate Tablet (K-PHOS No. 2) 1 Tablet(s) Oral three times a day with meals  predniSONE   Tablet 20 milliGRAM(s) Oral daily  senna 2 Tablet(s) Oral at bedtime  tacrolimus ER Tablet (ENVARSUS XR) 5 milliGRAM(s) Oral <User Schedule>  trimethoprim   80 mG/sulfamethoxazole 400 mG 1 Tablet(s) Oral daily  valGANciclovir 450 milliGRAM(s) Oral <User Schedule>    Tacrolimus (), Serum: 10.7 ng/mL (08-20 @ 06:53)    LABS:  08-20    140  |  110<H>  |  38<H>  ----------------------------<  148<H>  3.9   |  19<L>  |  1.79<H>    Ca    9.8      20 Aug 2023 06:58  Phos  1.9     08-20  Mg     1.9     08-20      Creatinine Trend: 1.79 <--, 1.82 <--, 1.88 <--, 2.10 <--, 2.12 <--, 2.09 <--, 2.30 <--    Phosphorus: 1.9 mg/dL (08-20 @ 06:58)                              8.3    4.09  )-----------( 142      ( 20 Aug 2023 06:54 )             27.0     Urine Studies:  Urinalysis - [08-20-23 @ 06:58]      Color  / Appearance  / SG  / pH       Gluc 148 / Ketone   / Bili  / Urobili        Blood  / Protein  / Leuk Est  / Nitrite       RBC  / WBC  / Hyaline  / Gran  / Sq Epi  / Non Sq Epi  / Bacteria       Iron 68, TIBC 185, %sat 37      [08-16-23 @ 06:39]  Ferritin 1010      [08-16-23 @ 06:39]    HBsAb Reactive      [08-17-23 @ 11:28]  HBsAg Nonreact      [08-17-23 @ 11:28]      RADIOLOGY & ADDITIONAL STUDIES:

## 2023-08-20 NOTE — DISCHARGE NOTE NURSING/CASE MANAGEMENT/SOCIAL WORK - PATIENT PORTAL LINK FT
You can access the FollowMyHealth Patient Portal offered by [facility] by registering at the following website: [email or web address]. By joining [facility] FollowMyHealth portal, you will also be able to view your health information using other applications (apps) compatible with our system.

## 2023-08-20 NOTE — PROGRESS NOTE ADULT - ASSESSMENT
52 yo M with hx of DM2 CAD, CHF, s/p CABG 2015, AICD (2016), on hemodialysis for approximately 6 years via L AVF (nephrologist Dr. Bharath Romo), He has hx of PVD, is s/p 4 stents in R leg (mid and distal right superficial femoral artery, right popliteal x2 on 1/14/22).; is sp RT big toe and second toe amputation 2021, on asa and plavix for severe PVD (per pt). Now here for possible DDRT. Pt reports does not make urine only few drops occasionally. Pt denies N/V/D, fevers/chills, CP, SOB. Pt reports last ate at 4pm today and had HD yesterday 7/14/23.  Nephrology consulted for ESRD s/p DDRT.      s/p DDRT @ [facility] 7/16/2023   Initially received Simulect induction -- Changed to Thymoglobulin given delayed graft function.  Pt discharged on previous admission on HD.   Post transport course significant for DGF  Via L AVF --> s/p Left Radiocephalic fistulogram and subclavian vein angioplasty with Vascular 7/24/2023  Consent obtained from patient and placed in chart.  s/p CT A& P No contrast 8/7/23 --> partially imaged L pleural effusion, unchanged. Partially  imaged LLL opacity again seen.  small amount of dependent air within urinary bladder, small renal cysts, left transplant kidney with mil hydro. Stent extends from kidney to bladder.   s/p Doppler Renal US Transplant kidney 8/7/23 --> patent renal vasculature. Elevated renal artery peak systolic velocities. Concern for possible renal artery stenosis at anastomosis. Mild elevation  of intrarenal resistive indices , grossly unchanged from prior exam.   Continue Immunosuppression per transplant : Envarsus per level ( 6mg) , Cellcept 1 gm bid, and pulse steroids, prednisone held while pt on pulse steroids   Tacro trough is 10.4 on 8/18   FK is 10.6 on 8/19 would reduced Tacrolimus to 3 mg BID  s/p Transplant renal biopsy 8/09 --Renal biopsy  showing AMR and diabetic nephrosclerosis (g1, ptc0-1 c4d 0). Plan for total 5 sessions of PLEX, s/p PLEX on  8/11, 8/12. #4 8/16, plan for Rituximab today following 5th PLEX. DSA labs sent 8/16 - pending   Monitor labs and urine output. Avoid nephrotoxins  HD per transplant, last HD 8/7 . Pt off Furosemide.   Dose medications as per eGFR.  Renal Diet   Monitor     Anemia  Mgmt per Transplant   s/p pRBC 8/17  Maintain Hgb > 8  Monitor     Hypophosphatemia  Supplement as needed.   Still low.     HTN:   BP controlled   Monitor     Proteinuria / Hematuria   likely in setting of + LE, bacteria  Urine cx + Klebsiella   On Cefazolin  Mgmt per Transplant ID

## 2023-08-20 NOTE — DISCHARGE NOTE NURSING/CASE MANAGEMENT/SOCIAL WORK - NSDCPETBCESMAN_GEN_ALL_CORE
If you are a smoker, it is important for your health to stop smoking. Please be aware that second hand smoke is also harmful. Date of Birth: [birth year]	Time of Birth:     Admission Weight (g): 850    Admission Date and Time:  22 @ 18:09         Gestational Age: 25.4     Source of admission [ __ ] Inborn     [ __ ]Transport from    [facility]: Dr Wilkerson requested Peds team headed by attending Neonatologist, Dr Vickers, to attend this unscheduled repeat c/s of a 25.4 wk  female w/ PPROM and maternal fever.  Mom is 35 yo,  O+/PNL (-)/immune/GBS + ( 3/3)/Covid (-).  Maternal hx of asthma Rx w/ albuterol nebs prn and past hx of gastric sleeve.  Pregnancy complicated by leakage of fluid since  and maternal fever 100.8 today.  Mom Rx w/ ampicillin, Azithromycin, and amoxacillin.  Received 2 doses BMZ on 3/4- and Mag Sulfate for neuroprotection.  ROM @ delivery - clear fluid.  Infant emerged in breech position, good tone and spontaneous cry.  Delayed cord clamping.  Baby brought to warmer, placed on warming mattress and plastic bag, w/ temp probe and pulse oximetry.  Started on CPAP 5/ 30%, but O2 sats remained low so PEEP increased to max 7 and max FiO2 60%, with improvement in color and O2sats.  FiO2 gradually weaned to 30% w/ O2 sats 92% by 6 minutes of life.  3 vessels in cord.  PE grossly normal.  Mom consents to Hep B vaccine.  Infant transferred to the NICU on CPAP 7/30% for further management.      Social History: No history of alcohol/tobacco exposure obtained  FHx: non-contributory to the condition being treated   ROS: unable to obtain ()

## 2023-08-20 NOTE — PROGRESS NOTE ADULT - PROVIDER SPECIALTY LIST ADULT
Nephrology
Nephrology
Transplant Nephrology
Transplant Surgery
Vascular Surgery
Cardiology
Cardiology
Internal Medicine
Intervent Radiology
Nephrology
Nephrology
Transplant Nephrology
Transplant Surgery
Cardiology
Internal Medicine
Internal Medicine
Nephrology
Transplant Nephrology
Transplant Nephrology
Transplant Surgery
Vascular Surgery
Cardiology
Internal Medicine
Nephrology
Transplant Surgery
Transplant Nephrology

## 2023-08-20 NOTE — PROGRESS NOTE ADULT - ASSESSMENT
Tee Salvador is a 54 y/o M with past medical history significant for HTN, CHF (s/p AICD 2016--last interrogated 7/15/23), CAD (s/p CABG 2015), IDDM, PVD s/p stent x4 in RLE with R big toe/second toe amputation (2021) and ESRD on HD via LUE AVF for 6 years now s/p DDRT 7/16/2023 with Thymoglobulin induction. Post-transplant course complicated by severe constipation requiring disimpaction, DGF requiring hemodialysis and LUE swelling with concern for L subclavian thrombus, now s/p IR fistulogram 7/7/23 with balloon angioplasty of subclavian vein. He was discharged home on 7/27/23 and remained on HD MWF at home center [facility]. He presents from home via EMS to [facility] on 8/7/23 with constipation and continued DGF    [ ] DGF s/p DDRT 7/16/23  - S/P IR biopsy on 8/10 with concern for AMR. will continue PLEX/IVIG x 5 cycles.  Rituximab completed on 8/18  - DSA+ : will continue pulse dose steroids with taper (500 on 8/8, 250 on 8/9, 125 on 8/10, 60 on 8/12).  Prednisone 20 mg daily now  - DSA labs sent today 8/16.   - graft function improving, Cr: 1.79   - last HD on 8/7, furosemide on Hold.    -BCx negative  -bowel regimen  -diet as tolerated  -SCDs  -epogen today, stable h/h. 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 Hypercoagulability work up initiated by Heme/Onc Team, Vascular team following     [ ] Immunosuppression  -Envarsus per level (level 10.7 so keeping Envarsus 5 mg daily)  -MMF 1/1  -steroids as above  -PPx regimen: Valcyte/Bactrim/Nystatin/PPI with renal dosing    [ ] DM  -Lantus/Lispro adjust while on steroids.    -Dispo: Discharge today w/ Eliquis sent to [facility]    # [phone]  Transplant Team Tee Salvador is a 54 y/o M with past medical history significant for HTN, CHF (s/p AICD 2016--last interrogated 7/15/23), CAD (s/p CABG 2015), IDDM, PVD s/p stent x4 in RLE with R big toe/second toe amputation (2021) and ESRD on HD via LUE AVF for 6 years now s/p DDRT 7/16/2023 with Thymoglobulin induction. Post-transplant course complicated by severe constipation requiring disimpaction, DGF requiring hemodialysis and LUE swelling with concern for L subclavian thrombus, now s/p IR fistulogram 7/7/23 with balloon angioplasty of subclavian vein. He was discharged home on 7/27/23 and remained on HD MWF at home center [facility]. He presents from home via EMS to [facility] on 8/7/23 with constipation and continued DGF    [ ] DGF s/p DDRT 7/16/23  - S/P IR biopsy on 8/10 with concern for AMR. will continue PLEX/IVIG x 5 cycles.  Rituximab completed on 8/18  - DSA+ : will continue pulse dose steroids with taper (500 on 8/8, 250 on 8/9, 125 on 8/10, 60 on 8/12).  Prednisone 20 mg daily now  - DSA labs sent today 8/16.   - graft function improving, Cr: 1.79   - last HD on 8/7, furosemide on Hold.    -BCx negative  -bowel regimen  -diet as tolerated  -SCDs  -epogen today, stable h/h. 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 Hypercoagulability work up initiated by Heme/Onc Team, Vascular team following     [ ] Immunosuppression  -Envarsus per level (level 10.7 so keeping Envarsus 5 mg daily)  -MMF 1/1  -steroids as above  -PPx regimen: Valcyte/Bactrim/Nystatin/PPI with renal dosing    [ ] DM  -Lantus/Lispro adjust while on steroids.    -Dispo: Discharge today w/ Eliquis sent to [facility]    # [phone]  Transplant Team

## 2023-08-20 NOTE — PROGRESS NOTE ADULT - ASSESSMENT
54 y/o M with past medical history significant for HTN, CHF (s/p AICD 2016), CAD (s/p CABG 2015), IDDM, PVD s/p stent x4 in RLE with R big toe/second toe amputation (2021) and ESRD on HD via LUE AVF for 6 years now s/p DDRT 7/16/2023 with Thymoglobulin induction.   Post-transplant course complicated by severe constipation requiring disimpaction, DGF requiring hemodialysis and LUE swelling with concern for L subclavian thrombus, now s/p IR fistulogram 7/7/23 with balloon angioplasty of subclavian vein.   discharged home on 7/27/23 and remained on HD MWF   He presents from home via EMS to [facility] on 8/7/23 with abdominal discomfort and no bowel movement for 3 days.     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

## 2023-08-20 NOTE — PROGRESS NOTE ADULT - SUBJECTIVE AND OBJECTIVE BOX
Surgery Daily Progress Note  =====================================================  SUBJECTIVE: A 53y Male Patient seen and examined at bedside on AM rounds.     ALLERGIES:  Contrast. (Unknown)  --------------------------------------------------------------------------------------    MEDICATIONS:    Neurologic Medications    Respiratory Medications    Cardiovascular Medications  carvedilol 3.125 milliGRAM(s) Oral every 12 hours    Gastrointestinal Medications  bisacodyl 5 milliGRAM(s) Oral at bedtime  dextrose 5%. 1000 milliLiter(s) IV Continuous <Continuous>  dextrose 5%. 1000 milliLiter(s) IV Continuous <Continuous>  famotidine    Tablet 20 milliGRAM(s) Oral daily  potassium phosphate / sodium phosphate Tablet (K-PHOS No. 2) 1 Tablet(s) Oral three times a day with meals  senna 2 Tablet(s) Oral at bedtime    Genitourinary Medications    Hematologic/Oncologic Medications  apixaban 5 milliGRAM(s) Oral every 12 hours  aspirin enteric coated 81 milliGRAM(s) Oral daily  mycophenolate mofetil 1000 milliGRAM(s) Oral two times a day  tacrolimus ER Tablet (ENVARSUS XR) 5 milliGRAM(s) Oral <User Schedule>    Antimicrobial/Immunologic Medications  nystatin    Suspension [id number] Unit(s) Oral four times a day  trimethoprim   80 mG/sulfamethoxazole 400 mG 1 Tablet(s) Oral daily  valGANciclovir 450 milliGRAM(s) Oral <User Schedule>    Endocrine/Metabolic Medications  atorvastatin 40 milliGRAM(s) Oral at bedtime  dextrose 50% Injectable 25 Gram(s) IV Push once  dextrose 50% Injectable 12.5 Gram(s) IV Push once  dextrose 50% Injectable 25 Gram(s) IV Push once  dextrose Oral Gel 15 Gram(s) Oral once PRN Blood Glucose LESS THAN 70 milliGRAM(s)/deciliter  glucagon  Injectable 1 milliGRAM(s) IntraMuscular once  insulin glargine Injectable (LANTUS) 10 Unit(s) SubCutaneous at bedtime  insulin lispro (ADMELOG) corrective regimen sliding scale   SubCutaneous three times a day before meals  insulin lispro (ADMELOG) corrective regimen sliding scale   SubCutaneous at bedtime  insulin lispro Injectable (ADMELOG) 10 Unit(s) SubCutaneous three times a day before meals  predniSONE   Tablet 20 milliGRAM(s) Oral daily    Topical/Other Medications    --------------------------------------------------------------------------------------    VITAL SIGNS:  Contrast. (Unknown)      T(C): 36.6 (08-20-23 @ 08:23), Max: 37.1 (08-19-23 @ 13:54)  HR: 83 (08-20-23 @ 08:23) (74 - 90)  BP: 147/73 (08-20-23 @ 08:23) (112/72 - 149/74)  RR: 18 (08-20-23 @ 08:23) (17 - 18)  SpO2: 100% (08-20-23 @ 08:23) (99% - 100%)  --------------------------------------------------------------------------------------    Physical Exam:  General: NAD, AOx3  Respiratory: No labored breathing  CV: pulse regularly present  MSK: b/l UE motor and sensation grossly intact. Pitting edema of the hand extending up to the shoulders.  Pulses exam: R radial and ulnar dopplerable signals. Unable to palpate pulses 2/2 to pitting edema.      --------------------------------------------------------------------------------------    I&Os  T(C): 36.6 (08-20-23 @ 08:23), Max: 37.1 (08-19-23 @ 13:54)  HR: 83 (08-20-23 @ 08:23) (74 - 90)  BP: 147/73 (08-20-23 @ 08:23) (112/72 - 149/74)  RR: 18 (08-20-23 @ 08:23) (17 - 18)  SpO2: 100% (08-20-23 @ 08:23) (99% - 100%)  --------------------------------------------------------------------------------------    LABS                          8.3    4.09  )-----------( 142      ( 20 Aug 2023 06:54 )             27.0     08-20    140  |  110<H>  |  38<H>  ----------------------------<  148<H>  3.9   |  19<L>  |  1.79<H>    Ca    9.8      20 Aug 2023 06:58  Phos  1.9     08-20  Mg     1.9     08-20      PT/INR - ( 20 Aug 2023 06:57 )   PT: 11.9 sec;   INR: 1.08 ratio         PTT - ( 20 Aug 2023 06:57 )  PTT:29.2 sec  Urinalysis Basic - ( 20 Aug 2023 06:58 )    Color: x / Appearance: x / SG: x / pH: x  Gluc: 148 mg/dL / Ketone: x  / Bili: x / Urobili: x   Blood: x / Protein: x / Nitrite: x   Leuk Esterase: x / RBC: x / WBC x   Sq Epi: x / Non Sq Epi: x / Bacteria: x        08-19-23 @ 07:01  -  08-20-23 @ 07:00  --------------------------------------------------------  IN: 985 mL / OUT: 1425 mL / NET: -440 mL      apixaban 5 milliGRAM(s) Oral every 12 hours  aspirin enteric coated 81 milliGRAM(s) Oral daily  atorvastatin 40 milliGRAM(s) Oral at bedtime  bisacodyl 5 milliGRAM(s) Oral at bedtime  carvedilol 3.125 milliGRAM(s) Oral every 12 hours  dextrose 5%. 1000 milliLiter(s) IV Continuous <Continuous>  dextrose 5%. 1000 milliLiter(s) IV Continuous <Continuous>  dextrose 50% Injectable 12.5 Gram(s) IV Push once  dextrose 50% Injectable 25 Gram(s) IV Push once  dextrose 50% Injectable 25 Gram(s) IV Push once  dextrose Oral Gel 15 Gram(s) Oral once PRN  famotidine    Tablet 20 milliGRAM(s) Oral daily  glucagon  Injectable 1 milliGRAM(s) IntraMuscular once  insulin glargine Injectable (LANTUS) 10 Unit(s) SubCutaneous at bedtime  insulin lispro (ADMELOG) corrective regimen sliding scale   SubCutaneous at bedtime  insulin lispro (ADMELOG) corrective regimen sliding scale   SubCutaneous three times a day before meals  insulin lispro Injectable (ADMELOG) 10 Unit(s) SubCutaneous three times a day before meals  mycophenolate mofetil 1000 milliGRAM(s) Oral two times a day  nystatin    Suspension [id number] Unit(s) Oral four times a day  potassium phosphate / sodium phosphate Tablet (K-PHOS No. 2) 1 Tablet(s) Oral three times a day with meals  predniSONE   Tablet 20 milliGRAM(s) Oral daily  senna 2 Tablet(s) Oral at bedtime  tacrolimus ER Tablet (ENVARSUS XR) 5 milliGRAM(s) Oral <User Schedule>  trimethoprim   80 mG/sulfamethoxazole 400 mG 1 Tablet(s) Oral daily  valGANciclovir 450 milliGRAM(s) Oral <User Schedule>      RADIOLOGY, EKG & ADDITIONAL TESTS: Reviewed.

## 2023-08-20 NOTE — PROGRESS NOTE ADULT - ASSESSMENT
53 yr old man with ESRD on HD for 6 years. DDRT o 7/14/23. PMH: DM2, CAD, CHF, s/p CABG 2015, AICD (2016), PVD, is s/p 4 stents in R leg , right big toe and second toe amputation 2021. Received DDRT on 7/14/23. Donor 59 yrs old, KDPI 83%, Terminal Cr 0.8. Course complicated by DGF. Received Thymo induction. Admitted with UTI and persistent graft dysfunction. Treated for Klebsiella UTI on Cefazolin. Transplant kidney biopsy 8/9/23 showed mild glomerulitis and PTC suspicious for mild ABMR scotty in the setting of Class II DSA 5900 and 5100. No vasculitis, c4d negative. Biopsy also showed diffuse and nodular diabetic glomerulosclerosis, early and mild, with moderately thickened glomerular basement membranes, and severe arterial and arteriolar sclerosis, most likely early diabetic nephropathy and is donor related. He was treated with Plex x3, Steroid pulse, Rituximab X1. Planned for IVIG 8/19 and d/c. MMF tac and steroid taper. Last HD was on 8/7/23. Recovered DGF Cr 1.8 downtrending.    S/p DDRT on 7/14/23 with delayed graft function, now recovered, last HD was on 8/7/23. Cr down to 1.79mg/dL.   Biopsy with mild ABMR, vascular sclerosis and donor related diabetic changes.     Biopsy proven mild ABMR (glomerulitis and PTC but no vasculitis, negative C4d)  in the setting of class II DSA ~ 5900 and 5100.   S/p Steroids Plex x5, Ritux x1 and IVIG x 5 last     Immunosuppression: Envarsus 5mg daily,  target trough 8-10, MMF 1 gram po bid, prednisone 20mg - taper as outpatient     Infection prophylaxis - nystatin, bactrim, valcyte     LUE swelling due to central venous stenosis + DVT - start Eliquis 5mg po bid. F/u with [facility] as outpatient     Anemia multifactorial - CKD, medications.   Epo 10k given on 8/11 and 8/18. s/p 1 u PRBC on 8/17   Hgb relatively stable     D/c home today   F/u with Dr. Hairston

## 2023-08-20 NOTE — PROGRESS NOTE ADULT - SUBJECTIVE AND OBJECTIVE BOX
Patient is a 53y old  Male who presents with a chief complaint of UTI, fecal impaction (20 Aug 2023 11:07)      DATE OF SERVICE: 08-20-23 @ 13:14    SUBJECTIVE / OVERNIGHT EVENTS: overnight events noted    ROS:  Resp: No cough no sputum production  CVS: No chest pain no palpitations no orthopnea  GI: no N/V/D  : no dysuria, no hematuria  left arm swelling       MEDICATIONS  (STANDING):  apixaban 5 milliGRAM(s) Oral every 12 hours  aspirin enteric coated 81 milliGRAM(s) Oral daily  atorvastatin 40 milliGRAM(s) Oral at bedtime  bisacodyl 5 milliGRAM(s) Oral at bedtime  carvedilol 3.125 milliGRAM(s) Oral every 12 hours  dextrose 5%. 1000 milliLiter(s) (50 mL/Hr) IV Continuous <Continuous>  dextrose 5%. 1000 milliLiter(s) (100 mL/Hr) IV Continuous <Continuous>  dextrose 50% Injectable 25 Gram(s) IV Push once  dextrose 50% Injectable 12.5 Gram(s) IV Push once  dextrose 50% Injectable 25 Gram(s) IV Push once  famotidine    Tablet 20 milliGRAM(s) Oral daily  glucagon  Injectable 1 milliGRAM(s) IntraMuscular once  insulin glargine Injectable (LANTUS) 10 Unit(s) SubCutaneous at bedtime  insulin lispro (ADMELOG) corrective regimen sliding scale   SubCutaneous at bedtime  insulin lispro (ADMELOG) corrective regimen sliding scale   SubCutaneous three times a day before meals  insulin lispro Injectable (ADMELOG) 10 Unit(s) SubCutaneous three times a day before meals  mycophenolate mofetil 1000 milliGRAM(s) Oral two times a day  nystatin    Suspension 524820 Unit(s) Oral four times a day  potassium phosphate / sodium phosphate Tablet (K-PHOS No. 2) 1 Tablet(s) Oral three times a day with meals  predniSONE   Tablet 20 milliGRAM(s) Oral daily  senna 2 Tablet(s) Oral at bedtime  tacrolimus ER Tablet (ENVARSUS XR) 5 milliGRAM(s) Oral <User Schedule>  trimethoprim   80 mG/sulfamethoxazole 400 mG 1 Tablet(s) Oral daily  valGANciclovir 450 milliGRAM(s) Oral <User Schedule>    MEDICATIONS  (PRN):  dextrose Oral Gel 15 Gram(s) Oral once PRN Blood Glucose LESS THAN 70 milliGRAM(s)/deciliter        CAPILLARY BLOOD GLUCOSE      POCT Blood Glucose.: 163 mg/dL (20 Aug 2023 08:26)  POCT Blood Glucose.: 174 mg/dL (19 Aug 2023 20:53)  POCT Blood Glucose.: 161 mg/dL (19 Aug 2023 17:46)    I&O's Summary    19 Aug 2023 07:01  -  20 Aug 2023 07:00  --------------------------------------------------------  IN: 985 mL / OUT: 1425 mL / NET: -440 mL        Vital Signs Last 24 Hrs  T(C): 36.6 (20 Aug 2023 08:23), Max: 37.1 (19 Aug 2023 13:54)  T(F): 97.8 (20 Aug 2023 08:23), Max: 98.8 (19 Aug 2023 18:21)  HR: 83 (20 Aug 2023 08:23) (74 - 90)  BP: 147/73 (20 Aug 2023 08:23) (112/72 - 149/74)  BP(mean): --  RR: 18 (20 Aug 2023 08:23) (17 - 18)  SpO2: 100% (20 Aug 2023 08:23) (99% - 100%)    PHYSICAL EXAM:  EYES: EOMI, PERRLA  CHEST/LUNG: clear   HEART: S1 S2; no murmurs   ABDOMEN: Soft, Nontender  EXTREMITIES:  LUE edema  NEUROLOGY: AO x 3 non-focal      LABS:                        8.3    4.09  )-----------( 142      ( 20 Aug 2023 06:54 )             27.0     08-20    140  |  110<H>  |  38<H>  ----------------------------<  148<H>  3.9   |  19<L>  |  1.79<H>    Ca    9.8      20 Aug 2023 06:58  Phos  1.9     08-20  Mg     1.9     08-20      PT/INR - ( 20 Aug 2023 06:57 )   PT: 11.9 sec;   INR: 1.08 ratio         PTT - ( 20 Aug 2023 06:57 )  PTT:29.2 sec      Urinalysis Basic - ( 20 Aug 2023 06:58 )    Color: x / Appearance: x / SG: x / pH: x  Gluc: 148 mg/dL / Ketone: x  / Bili: x / Urobili: x   Blood: x / Protein: x / Nitrite: x   Leuk Esterase: x / RBC: x / WBC x   Sq Epi: x / Non Sq Epi: x / Bacteria: x          All consultant(s) notes reviewed and care discussed with other providers        Contact Number, Dr Murphy [phone]

## 2023-08-21 LAB
AT III ACT/NOR PPP CHRO: 90 % — SIGNIFICANT CHANGE UP (ref 85–135)
DRVVT RATIO: 0.59 RATIO — SIGNIFICANT CHANGE UP (ref 0–1.21)
DRVVT SCREEN TO CONFIRM RATIO: SIGNIFICANT CHANGE UP
NORMALIZED SCT PPP-RTO: 0.57 RATIO — SIGNIFICANT CHANGE UP (ref 0–1.16)
NORMALIZED SCT PPP-RTO: SIGNIFICANT CHANGE UP
PROT C ACT/NOR PPP: 122 % — SIGNIFICANT CHANGE UP (ref 74–150)

## 2023-08-22 ENCOUNTER — APPOINTMENT (OUTPATIENT)
Dept: TRANSPLANT | Facility: CLINIC | Age: 53
End: 2023-08-22

## 2023-08-22 LAB — B2 GLYCOPROT1 AB SER QL: NEGATIVE — SIGNIFICANT CHANGE UP

## 2023-08-23 ENCOUNTER — LABORATORY RESULT (OUTPATIENT)
Age: 53
End: 2023-08-23

## 2023-08-23 ENCOUNTER — APPOINTMENT (OUTPATIENT)
Dept: NEPHROLOGY | Facility: CLINIC | Age: 53
End: 2023-08-23
Payer: MEDICARE

## 2023-08-23 VITALS
DIASTOLIC BLOOD PRESSURE: 49 MMHG | WEIGHT: 240.3 LBS | SYSTOLIC BLOOD PRESSURE: 125 MMHG | HEIGHT: 68 IN | OXYGEN SATURATION: 100 % | TEMPERATURE: 97.9 F | HEART RATE: 92 BPM | BODY MASS INDEX: 36.42 KG/M2

## 2023-08-23 LAB — CARDIOLIPIN AB SER-ACNC: NEGATIVE — SIGNIFICANT CHANGE UP

## 2023-08-23 PROCEDURE — 99215 OFFICE O/P EST HI 40 MIN: CPT

## 2023-08-23 NOTE — REASON FOR VISIT
[Follow-Up] : a follow-up visit [FreeTextEntry1] : Here for post transplant visit, following discharge after readmission

## 2023-08-23 NOTE — HISTORY OF PRESENT ILLNESS
[FreeTextEntry1] : He received DDRT 7/16/23, Thymo induction. DGF, was readmitted and discharged on 8/19/23. Details of hospitalization, medications at discharge  as noted. He is now off dialysis. 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 Recvd Apheresis, IV Ig and Rituximab. Also treated for UTI. Has left UE DVT on Eliquis. reports left UE edema improving.  Home glucose controlled No SOB. Vascular surgeon: Dr. Malone   No fever. No hematuria  Hospital Course: Discharge Date	20-Aug-2023   Admission Date	07-Aug-2023 06:56 Reason for Admission	UTI, fecal impaction, DGF Hospital Course	 Tee Salvador is a 52 y/o M with past medical history significant for HTN, CHF (s/p AICD 2016), CAD (s/p CABG 2015), IDDM, PVD s/p stent x4 in RLE with R big toe/second toe amputation (2021) and ESRD on HD via LUE AVF for 6 years now s/p DDRT 7/16/2023 with Thymoglobulin induction. Post-transplant course complicated by severe constipation requiring disimpaction, DGF requiring hemodialysis and LUE swelling with concern for L subclavian thrombus, now s/p IR fistulogram 7/7/23 with balloon angioplasty of subclavian vein. He was discharged home on 7/27/23 and remained on HD MWF at home center [facility]. He presents from home via EMS to [facility] on 8/7/23 with abdominal discomfort and no bowel movement for 3 days.   Patient reports that he has had no bowel movement, reports this is "just like what happened last time". Reports he is straining but has "no abdominal muscles" to have BM. Denies nausea/vomiting. Reports reduced PO intake and appetite. Denies fevers, chills. Reports increasing urine output up to 500ml/day, still with LIANA in place from transplant surgery. Reports his last HD was on Friday 8/4/23 with 1.75L removed.   Hospital course was as follows: - Found to have Klebsiella UTI, transplant ID consulted, completed 7 day course of antibiotics (Ancef sensitive) last dose 8/16/23 - Blood cultures remained with NGTD, still with ureteral stent in place - DGF: Last HD was on 8/8/23 with improvement in graft function - Constipation: Lactulose, enemas, senna/miralax given with return of bowel function - DSAs send 8/7/23 positive >5000 MFI - IR renal transplant biopsy on 8/9/23 with findings of AMR, C4d staining negative, early diabetic nephropathy (donor related) - IV Steroids with Solu-medrol 500mg x1 (8/8/23) to Prednisone 20mg daily - S/P PLEX/IVIG x5 sessions (first session 8/11, last PLEX 8/18, last IVIF 8/19) - Rituximab s/p PLEX on 8/18 - Anemia of chronic disease: s/p 1u RBC 8/17 - LUE swelling noted 8/18 (where LUE AVF is) --> LUE duplex w/ persistent left subclavian DVT noted but patient wishes to address this with his own vascular surgeon - Creatinine on day of discharge 1.79 - Insulin regimen adjusted based on steroid induced hyperglycemia       Discharge Medications	 aspirin 81 mg oral delayed release tablet: 1 tab(s) orally once a day atorvastatin 40 mg oral tablet: 1 tab(s) orally once a day (at bedtime) carvedilol 3.125 mg oral tablet: 1 tab(s) orally every 12 hours Eliquis 5 mg oral tablet: 1 tab(s) orally 2 times a day HumaLOG KwikPen 100 units/mL injectable solution: 10 unit(s) injectable 3 times a day (before meals) insulin glargine 100 units/mL subcutaneous solution: 10 unit(s) subcutaneous once a day (at bedtime) mycophenolate mofetil 250 mg oral capsule: 1,000 milligram(s) orally 2 times a day nystatin 100,000 units/mL oral suspension: 5 milliliter(s) orally 4 times a day Pepcid 20 mg oral tablet: 1 tab(s) orally once a day predniSONE 20 mg oral tablet: 1 tab(s) orally once a day senna leaf extract oral tablet: 2 tab(s) orally once a day (at bedtime) sulfamethoxazole-trimethoprim 400 mg-80 mg oral tablet: 1 tab(s) orally once a day tacrolimus 1 mg oral tablet, extended release: 5 tab(s) orally once a day valGANciclovir 450 mg oral tablet: 1 tab(s) orally Monday, Wednesday, and Friday

## 2023-08-23 NOTE — PHYSICAL EXAM
[General Appearance - Alert] : alert [General Appearance - In No Acute Distress] : in no acute distress [Sclera] : the sclera and conjunctiva were normal [Outer Ear] : the ears and nose were normal in appearance [Involuntary Movements] : no involuntary movements were seen [___ (cm) Fistula] : [unfilled] (cm) fistula [] : no rash [Oriented To Time, Place, And Person] : oriented to person, place, and time [Impaired Insight] : insight and judgment were intact [Cervical Lymph Nodes Enlarged Posterior Bilaterally] : posterior cervical [Cervical Lymph Nodes Enlarged Anterior Bilaterally] : anterior cervical [Heart Sounds Gallop] : no gallops [Heart Sounds Pericardial Friction Rub] : no pericardial rub [FreeTextEntry1] : A, OX3

## 2023-08-23 NOTE — ASSESSMENT
[FreeTextEntry1] : Renal Transplant recipient: Noted delayed allograft function, AMR treated, has ureter stent, . Reviewed for urinary symptoms/fever/chills/pain/new symptoms. Tolerating medications. Lab data from hospital reviewed including allograft function,   Immunosuppression: reviewed; Noted induction regimen, maintenance regimen and reviewed target trough level. On Envarsus 5 mg/d Cellcept 1000/d and prednisone 20 mg/d Will review labs and decrease prednisone if labs are stable. DM: Current regimen reviewed. Optimal target glucose levels reviewed for fasting and post prandial measurements. Will continue to monitor and adjust treatment; reviewed lifestyle modifications for glycemia control. Has flow sheets to maintain home charts of glucose , blood pressure, temperature, weight and urine output. Hypertension: controlled; Reviewed medications.  Reviewed target for blood pressure control. DVT: Left UE, On Eliquis. Will f/u with vascular surgeon. Reports improving edema Prophylaxis: Reviewed antimicrobial and GI prophylaxis as well as precautions to prevent infections. Discussed ambulation,   optimal glucose and blood pressure readings, adherence with medications and follow ups, follow up clinic visit schedule, avoiding dehydration, mosquito bites; prevention of DVT as well as food safety. Patient has met with transplant surgeon post transplant; post op wound care ureteral stent removal and follow up care were discussed. Advised to bring flow sheets and medication list at every visit. Copy of office visit and lab reports are being sent to primary physician and referring nephrologist. Primary care/nephrologist: Dr. Talavera F/u weekly

## 2023-08-24 LAB
HBV SURFACE AG SER QL: NONREACTIVE
HCV AB SER QL: NONREACTIVE
HCV RNA SERPL NAA+PROBE-LOG IU: NOT DETECTED LOGIU/ML
HCV S/CO RATIO: 0.07 S/CO
HEPB DNA PCR INT: NOT DETECTED
HEPB DNA PCR LOG: NOT DETECTED LOGIU/ML
HEPC RNA INTERP: NOT DETECTED
HIV1 RNA # SERPL NAA+PROBE: NORMAL
HIV1 RNA # SERPL NAA+PROBE: NORMAL COPIES/ML
HIV1+2 AB SPEC QL IA.RAPID: NONREACTIVE
VIRAL LOAD INTERP: NORMAL
VIRAL LOAD LOG: NORMAL LG COP/ML

## 2023-08-25 LAB — PROT S FREE PPP-ACNC: 43 % — LOW (ref 63–140)

## 2023-08-28 ENCOUNTER — APPOINTMENT (OUTPATIENT)
Dept: TRANSPLANT | Facility: CLINIC | Age: 53
End: 2023-08-28
Payer: MEDICARE

## 2023-08-28 ENCOUNTER — LABORATORY RESULT (OUTPATIENT)
Age: 53
End: 2023-08-28

## 2023-08-28 VITALS
SYSTOLIC BLOOD PRESSURE: 95 MMHG | HEART RATE: 102 BPM | BODY MASS INDEX: 32.43 KG/M2 | DIASTOLIC BLOOD PRESSURE: 36 MMHG | WEIGHT: 214 LBS | OXYGEN SATURATION: 99 % | HEIGHT: 68 IN | TEMPERATURE: 97.3 F | RESPIRATION RATE: 13 BRPM

## 2023-08-28 PROCEDURE — 99215 OFFICE O/P EST HI 40 MIN: CPT | Mod: 24

## 2023-08-28 RX ORDER — CALCIUM ACETATE 667 MG/1
667 TABLET ORAL 3 TIMES DAILY
Qty: 90 | Refills: 0 | Status: DISCONTINUED | COMMUNITY
Start: 2023-07-17 | End: 2023-08-28

## 2023-08-28 NOTE — PHYSICAL EXAM
[No Acute Distress] : no acute distress [Sclera Anicteric] : sclera anicteric [Soft] : soft [Non-tender] : non-tender [In Left Arm] : fistula/graft in left arm [] : right femoral palpable [Normal] : normal [FreeTextEntry1] : No oral thrush [TextBox_34] : LUE edema. 2+ b/l LE edema [TextBox_86] : No inguinal lymphadenopathy  [Site: ___] : Site: [unfilled] [Clean] : clean [Dry] : dry [Healing Well] : healing well [Erythema] : not erythematous [Warm] : not warm [Tender] : not tender

## 2023-08-28 NOTE — ASSESSMENT
[FreeTextEntry1] : 53M s/p DDRT 7/16/23 with post-op AMR, treated with plasmapheresis and rituximab. Currently with stable Cr and off HD
breast self-exam

## 2023-08-28 NOTE — PLAN
[FreeTextEntry1] : Immunosuppression - Cont Envarsus 3 mg daily, Prednisone 20 mg daily, Cellcept 1gm bid Will check tacrolimus level and adjust dose for goal ~8 Possible transition to belatacept due to biopsy showing diabetic changes. Cont eliquis for LUE DVT. f/u with vascular surgeon Prophylaxis with nystatin, valcyte and bactrim HTN controlled with carvedilol DM controlled with Lantus 10u qhs and humalog 10 u premeal Scheduled for cystoscopy and ureteral stent removal next week. Hold eliquis prior  Patient to follow-up with nephrology next week, then continue with weekly appointments.

## 2023-08-29 LAB
ALBUMIN SERPL ELPH-MCNC: 4 G/DL
ALP BLD-CCNC: 86 U/L
ALT SERPL-CCNC: 7 U/L
ANION GAP SERPL CALC-SCNC: 11 MMOL/L
AST SERPL-CCNC: 10 U/L
BILIRUB SERPL-MCNC: 0.4 MG/DL
BKV DNA SPEC QL NAA+PROBE: NOT DETECTED IU/ML
BUN SERPL-MCNC: 19 MG/DL
CALCIUM SERPL-MCNC: 10.2 MG/DL
CHLORIDE SERPL-SCNC: 114 MMOL/L
CO2 SERPL-SCNC: 16 MMOL/L
CREAT SERPL-MCNC: 1.32 MG/DL
EGFR: 65 ML/MIN/1.73M2
GLUCOSE SERPL-MCNC: 132 MG/DL
MAGNESIUM SERPL-MCNC: 1.7 MG/DL
PHOSPHATE SERPL-MCNC: 1.3 MG/DL
POTASSIUM SERPL-SCNC: 5 MMOL/L
PROT SERPL-MCNC: 5.6 G/DL
SODIUM SERPL-SCNC: 142 MMOL/L
TACROLIMUS SERPL-MCNC: 5.4 NG/ML
URATE SERPL-MCNC: 5.8 MG/DL

## 2023-09-04 ENCOUNTER — TRANSCRIPTION ENCOUNTER (OUTPATIENT)
Age: 53
End: 2023-09-04

## 2023-09-05 ENCOUNTER — OUTPATIENT (OUTPATIENT)
Dept: OUTPATIENT SERVICES | Facility: HOSPITAL | Age: 53
LOS: 1 days | End: 2023-09-05
Payer: MEDICARE

## 2023-09-05 ENCOUNTER — APPOINTMENT (OUTPATIENT)
Dept: TRANSPLANT | Facility: HOSPITAL | Age: 53
End: 2023-09-05

## 2023-09-05 ENCOUNTER — TRANSCRIPTION ENCOUNTER (OUTPATIENT)
Age: 53
End: 2023-09-05

## 2023-09-05 VITALS
RESPIRATION RATE: 19 BRPM | DIASTOLIC BLOOD PRESSURE: 67 MMHG | OXYGEN SATURATION: 100 % | TEMPERATURE: 97 F | HEART RATE: 79 BPM | SYSTOLIC BLOOD PRESSURE: 152 MMHG

## 2023-09-05 VITALS
HEART RATE: 75 BPM | TEMPERATURE: 97 F | SYSTOLIC BLOOD PRESSURE: 137 MMHG | OXYGEN SATURATION: 100 % | HEIGHT: 69 IN | WEIGHT: 212.08 LBS | DIASTOLIC BLOOD PRESSURE: 77 MMHG | RESPIRATION RATE: 16 BRPM

## 2023-09-05 DIAGNOSIS — Z89.422 ACQUIRED ABSENCE OF OTHER LEFT TOE(S): Chronic | ICD-10-CM

## 2023-09-05 DIAGNOSIS — Z95.1 PRESENCE OF AORTOCORONARY BYPASS GRAFT: Chronic | ICD-10-CM

## 2023-09-05 DIAGNOSIS — Z94.0 KIDNEY TRANSPLANT STATUS: ICD-10-CM

## 2023-09-05 DIAGNOSIS — Z95.810 PRESENCE OF AUTOMATIC (IMPLANTABLE) CARDIAC DEFIBRILLATOR: Chronic | ICD-10-CM

## 2023-09-05 PROCEDURE — 82962 GLUCOSE BLOOD TEST: CPT

## 2023-09-05 PROCEDURE — 52310 CYSTOSCOPY AND TREATMENT: CPT | Mod: 58

## 2023-09-05 PROCEDURE — C1747: CPT

## 2023-09-05 PROCEDURE — 52310 CYSTOSCOPY AND TREATMENT: CPT | Mod: LT

## 2023-09-05 DEVICE — ASCOPE 4 CYSTOSCOPE FLEX 6X390MM
Type: IMPLANTABLE DEVICE | Status: NON-FUNCTIONAL
Removed: 2023-09-05

## 2023-09-05 RX ORDER — INSULIN LISPRO 100/ML
10 VIAL (ML) SUBCUTANEOUS
Qty: 0 | Refills: 0 | DISCHARGE

## 2023-09-05 RX ORDER — ONDANSETRON 8 MG/1
4 TABLET, FILM COATED ORAL ONCE
Refills: 0 | Status: DISCONTINUED | OUTPATIENT
Start: 2023-09-05 | End: 2023-09-19

## 2023-09-05 RX ORDER — OXYCODONE HYDROCHLORIDE 5 MG/1
5 TABLET ORAL ONCE
Refills: 0 | Status: DISCONTINUED | OUTPATIENT
Start: 2023-09-05 | End: 2023-09-05

## 2023-09-05 RX ORDER — ASPIRIN/CALCIUM CARB/MAGNESIUM 324 MG
1 TABLET ORAL
Qty: 0 | Refills: 0 | DISCHARGE

## 2023-09-05 RX ORDER — FAMOTIDINE 10 MG/ML
1 INJECTION INTRAVENOUS
Refills: 0 | DISCHARGE

## 2023-09-05 NOTE — PRE-ANESTHESIA EVALUATION ADULT - HEART RATE (BEATS/MIN)
What Type Of Note Output Would You Prefer (Optional)?: Standard Output
Hpi Title: Evaluation of Skin Lesions
How Severe Are Your Spot(S)?: mild
75

## 2023-09-05 NOTE — H&P PST ADULT - HISTORY OF PRESENT ILLNESS
53M s/p DDRT 7/15/23 with improving renal function, last Cr 1.2.  Recently treated for AMR with plasmapheresis and IVIg, rituximab.  Now for cystoscopy with removal of ureteral stent from right transplant kidney.  Patient denies any fevers, abdominal pain, nausea, diarrhea, hematuria or dysuria.   Eliquis last taken 3 days ago

## 2023-09-05 NOTE — BRIEF OPERATIVE NOTE - NSICDXBRIEFPROCEDURE_GEN_ALL_CORE_FT
PROCEDURES:  Cystoscopy with removal of double-J ureteral stent 05-Sep-2023 09:35:10  Michel Vizcaino

## 2023-09-05 NOTE — H&P PST ADULT - ATTENDING COMMENTS
53M s/p DDRT 7/15/23 with improving renal function, last Cr 1.2.  Now for cystoscopy with removal of ureteral stent from right transplant kidney.  Details of procedure discussed, including risks of hematuria, dysuria, GIN, UTI

## 2023-09-05 NOTE — ASU DISCHARGE PLAN (ADULT/PEDIATRIC) - NS MD DC FALL RISK RISK
For information on Fall & Injury Prevention, visit: [email or web address] OR  [email or web address] OR  https://www.cdc.gov/steadi/patient.html

## 2023-09-06 ENCOUNTER — APPOINTMENT (OUTPATIENT)
Dept: NEPHROLOGY | Facility: CLINIC | Age: 53
End: 2023-09-06
Payer: MEDICARE

## 2023-09-06 VITALS
HEIGHT: 68 IN | RESPIRATION RATE: 13 BRPM | HEART RATE: 79 BPM | WEIGHT: 212 LBS | SYSTOLIC BLOOD PRESSURE: 128 MMHG | TEMPERATURE: 96.1 F | DIASTOLIC BLOOD PRESSURE: 52 MMHG | OXYGEN SATURATION: 100 % | BODY MASS INDEX: 32.13 KG/M2

## 2023-09-06 LAB
ALBUMIN SERPL ELPH-MCNC: 3.9 G/DL
ALP BLD-CCNC: 84 U/L
ALT SERPL-CCNC: 10 U/L
ANION GAP SERPL CALC-SCNC: 13 MMOL/L
AST SERPL-CCNC: 9 U/L
BILIRUB SERPL-MCNC: 0.4 MG/DL
BUN SERPL-MCNC: 17 MG/DL
CALCIUM SERPL-MCNC: 9.7 MG/DL
CHLORIDE SERPL-SCNC: 110 MMOL/L
CO2 SERPL-SCNC: 18 MMOL/L
CREAT SERPL-MCNC: 1.26 MG/DL
EGFR: 69 ML/MIN/1.73M2
GLUCOSE SERPL-MCNC: 111 MG/DL
LDH SERPL-CCNC: 165 U/L
MAGNESIUM SERPL-MCNC: 1.5 MG/DL
PHOSPHATE SERPL-MCNC: 2 MG/DL
POTASSIUM SERPL-SCNC: 4.5 MMOL/L
PROT SERPL-MCNC: 5.5 G/DL
SODIUM SERPL-SCNC: 142 MMOL/L
TACROLIMUS SERPL-MCNC: 11.6 NG/ML
URATE SERPL-MCNC: 4.9 MG/DL

## 2023-09-06 PROCEDURE — 99214 OFFICE O/P EST MOD 30 MIN: CPT

## 2023-09-06 RX ORDER — NYSTATIN 100000 1/G
100000 POWDER TOPICAL
Qty: 1 | Refills: 0 | Status: ACTIVE | COMMUNITY
Start: 2023-09-06 | End: 1900-01-01

## 2023-09-06 NOTE — ASSESSMENT
[FreeTextEntry1] : Renal Transplant recipient: Noted delayed allograft function, AMR treated, has ureter stent, . Reviewed for urinary symptoms/fever/chills/pain/new symptoms. Tolerating medications. Lab data from hospital reviewed including allograft function,   Immunosuppression: reviewed; Noted induction regimen, maintenance regimen and reviewed target trough level. On Envarsus 5 mg/d Cellcept 1000/d and prednisone 20 mg/d He might have taken higher dose Envarsus by error (9) Will review labs and decrease prednisone if labs are stable. DM: Current regimen reviewed. Optimal target glucose levels reviewed for fasting and post prandial measurements. Will continue to monitor and adjust treatment; reviewed lifestyle modifications for glycemia control. Has flow sheets to maintain home charts of glucose , blood pressure, temperature, weight and urine output. Hypertension: controlled; Reviewed medications.  Reviewed target for blood pressure control. DVT: Left UE, On Eliquis. Will f/u with vascular surgeon. Reports improving edema. Planned for AVF closure Prophylaxis: Reviewed antimicrobial and GI prophylaxis as well as precautions to prevent infections. Discussed ambulation,   optimal glucose and blood pressure readings, adherence with medications and follow ups, follow up clinic visit schedule, avoiding dehydration, mosquito bites; prevention of DVT as well as food safety. Patient has met with transplant surgeon post transplant; post op wound care ureteral stent removal and follow up care were discussed. CAD CABG: On secondary prevention strategies. Advised to bring flow sheets and medication list at every visit. Copy of office visit and lab reports are being sent to primary physician and referring nephrologist. Primary care/nephrologist: Dr. Talavera F/u 2 weekly

## 2023-09-06 NOTE — HISTORY OF PRESENT ILLNESS
[FreeTextEntry1] : He received DDRT 7/16/23, Thymo induction. DGF, was readmitted and discharged on 8/19/23. Details of hospitalization, medications at discharge  as noted. He is now off dialysis. 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 Recvd Apheresis, IV Ig and Rituximab. Also treated for UTI. Has left UE DVT on Eliquis. He reports left UE edema and trave ankle edema on left (side of transplant). His ureter stent was removed on 9/5/23. Labs done today. He restarted Eliquis today.  Home glucose controlled No SOB. Vascular surgeon: Dr. Malone   No fever. No hematuria Has seen vascular surgeon planned for AVF closure on Friday.   Discharge Medications	 aspirin 81 mg oral delayed release tablet: 1 tab(s) orally once a day atorvastatin 40 mg oral tablet: 1 tab(s) orally once a day (at bedtime) carvedilol 3.125 mg oral tablet: 1 tab(s) orally every 12 hours Eliquis 5 mg oral tablet: 1 tab(s) orally 2 times a day HumaLOG KwikPen 100 units/mL injectable solution: 8-10 unit(s) injectable 3 times a day (before meals) insulin glargine 100 units/mL subcutaneous solution: 21 unit(s) subcutaneous once a day (at bedtime) mycophenolate mofetil 250 mg oral capsule: 1,000 milligram(s) orally 2 times a day nystatin 100,000 units/mL oral suspension: 5 milliliter(s) orally 4 times a day Pepcid 20 mg oral tablet: 1 tab(s) orally once a day predniSONE 10 mg oral tablet: 1 tab(s) orally once a day-  senna leaf extract oral tablet: 2 tab(s) orally once a day (at bedtime) sulfamethoxazole-trimethoprim 400 mg-80 mg oral tablet: 1 tab(s) orally once a day Envarsus: 5 mg once a day  valGANciclovir 450 mg oral tablet: 1 tab increased to daily

## 2023-09-06 NOTE — PHYSICAL EXAM
[General Appearance - Alert] : alert [General Appearance - In No Acute Distress] : in no acute distress [Sclera] : the sclera and conjunctiva were normal [Outer Ear] : the ears and nose were normal in appearance [Jugular Venous Distention Increased] : there was no jugular-venous distention [Heart Sounds Gallop] : no gallops [Heart Sounds Pericardial Friction Rub] : no pericardial rub [Cervical Lymph Nodes Enlarged Posterior Bilaterally] : posterior cervical [Cervical Lymph Nodes Enlarged Anterior Bilaterally] : anterior cervical [Involuntary Movements] : no involuntary movements were seen [___ (cm) Fistula] : [unfilled] (cm) fistula [] : no rash [FreeTextEntry1] : A, OX3 [Oriented To Time, Place, And Person] : oriented to person, place, and time [Impaired Insight] : insight and judgment were intact

## 2023-09-07 LAB
HCT VFR BLD CALC: 30.1 %
HGB BLD-MCNC: 9.2 G/DL
MCHC RBC-ENTMCNC: 30.6 GM/DL
MCHC RBC-ENTMCNC: 30.6 PG
MCV RBC AUTO: 100 FL
PLATELET # BLD AUTO: 180 K/UL
RBC # BLD: 3.01 M/UL
RBC # FLD: 16.1 %
WBC # FLD AUTO: 4.47 K/UL

## 2023-09-08 LAB — BKV DNA SPEC QL NAA+PROBE: NOT DETECTED IU/ML

## 2023-09-13 ENCOUNTER — APPOINTMENT (OUTPATIENT)
Dept: TRANSPLANT | Facility: CLINIC | Age: 53
End: 2023-09-13
Payer: MEDICARE

## 2023-09-13 VITALS
OXYGEN SATURATION: 100 % | WEIGHT: 216 LBS | DIASTOLIC BLOOD PRESSURE: 61 MMHG | HEIGHT: 68 IN | HEART RATE: 102 BPM | SYSTOLIC BLOOD PRESSURE: 113 MMHG | TEMPERATURE: 97.1 F | BODY MASS INDEX: 32.74 KG/M2 | RESPIRATION RATE: 13 BRPM

## 2023-09-13 PROCEDURE — 99215 OFFICE O/P EST HI 40 MIN: CPT | Mod: 24

## 2023-09-14 ENCOUNTER — APPOINTMENT (OUTPATIENT)
Dept: ENDOCRINOLOGY | Facility: HOSPITAL | Age: 53
End: 2023-09-14
Payer: MEDICARE

## 2023-09-26 ENCOUNTER — NON-APPOINTMENT (OUTPATIENT)
Age: 53
End: 2023-09-26

## 2023-09-26 ENCOUNTER — LABORATORY RESULT (OUTPATIENT)
Age: 53
End: 2023-09-26

## 2023-09-26 ENCOUNTER — APPOINTMENT (OUTPATIENT)
Dept: NEPHROLOGY | Facility: CLINIC | Age: 53
End: 2023-09-26
Payer: MEDICARE

## 2023-09-26 VITALS
SYSTOLIC BLOOD PRESSURE: 136 MMHG | HEART RATE: 88 BPM | TEMPERATURE: 98 F | OXYGEN SATURATION: 100 % | DIASTOLIC BLOOD PRESSURE: 74 MMHG | WEIGHT: 205 LBS | HEIGHT: 68 IN | BODY MASS INDEX: 31.07 KG/M2 | RESPIRATION RATE: 14 BRPM

## 2023-09-26 LAB
ALBUMIN SERPL ELPH-MCNC: 4.4 G/DL
ALP BLD-CCNC: 89 U/L
ALT SERPL-CCNC: 9 U/L
ANION GAP SERPL CALC-SCNC: 11 MMOL/L
APPEARANCE: CLEAR
AST SERPL-CCNC: 11 U/L
BILIRUB SERPL-MCNC: 0.4 MG/DL
BILIRUBIN URINE: NEGATIVE
BLOOD URINE: ABNORMAL
BUN SERPL-MCNC: 28 MG/DL
CALCIUM SERPL-MCNC: 10.3 MG/DL
CHLORIDE SERPL-SCNC: 107 MMOL/L
CO2 SERPL-SCNC: 22 MMOL/L
COLOR: YELLOW
CREAT SERPL-MCNC: 1.38 MG/DL
CREAT SPEC-SCNC: 95 MG/DL
EGFR: 62 ML/MIN/1.73M2
GLUCOSE QUALITATIVE U: NEGATIVE MG/DL
GLUCOSE SERPL-MCNC: 56 MG/DL
HCT VFR BLD CALC: 31.2 %
HGB BLD-MCNC: 9.8 G/DL
KETONES URINE: NEGATIVE MG/DL
LEUKOCYTE ESTERASE URINE: NEGATIVE
MAGNESIUM SERPL-MCNC: 1.4 MG/DL
MCHC RBC-ENTMCNC: 30 PG
MCHC RBC-ENTMCNC: 31.4 GM/DL
MCV RBC AUTO: 95.4 FL
MICROALBUMIN 24H UR DL<=1MG/L-MCNC: 3.4 MG/DL
MICROALBUMIN/CREAT 24H UR-RTO: 36 MG/G
NITRITE URINE: NEGATIVE
PH URINE: 6.5
PHOSPHATE SERPL-MCNC: 2.1 MG/DL
PLATELET # BLD AUTO: 200 K/UL
POTASSIUM SERPL-SCNC: 5 MMOL/L
PROT SERPL-MCNC: 6.2 G/DL
PROTEIN URINE: NORMAL MG/DL
RBC # BLD: 3.27 M/UL
RBC # FLD: 14.4 %
SODIUM SERPL-SCNC: 140 MMOL/L
SPECIFIC GRAVITY URINE: 1.02
TACROLIMUS SERPL-MCNC: 7.7 NG/ML
URATE SERPL-MCNC: 4.7 MG/DL
UROBILINOGEN URINE: 1 MG/DL
WBC # FLD AUTO: 1.67 K/UL

## 2023-09-26 PROCEDURE — 99214 OFFICE O/P EST MOD 30 MIN: CPT

## 2023-09-26 RX ORDER — POLYETHYLENE GLYCOL 3350 AND ELECTROLYTES WITH LEMON FLAVOR 236; 22.74; 6.74; 5.86; 2.97 G/4L; G/4L; G/4L; G/4L; G/4L
236 POWDER, FOR SOLUTION ORAL
Qty: 1 | Refills: 0 | Status: DISCONTINUED | COMMUNITY
Start: 2022-09-22 | End: 2023-09-26

## 2023-09-26 RX ORDER — CILOSTAZOL 50 MG/1
50 TABLET ORAL
Qty: 180 | Refills: 3 | Status: DISCONTINUED | COMMUNITY
Start: 2022-02-17 | End: 2023-09-26

## 2023-09-26 RX ORDER — SENNOSIDES 8.6 MG
8.6 TABLET ORAL DAILY
Qty: 30 | Refills: 3 | Status: DISCONTINUED | COMMUNITY
Start: 2023-07-17 | End: 2023-09-26

## 2023-09-26 RX ORDER — CARVEDILOL 6.25 MG/1
6.25 TABLET, FILM COATED ORAL
Qty: 180 | Refills: 0 | Status: DISCONTINUED | COMMUNITY
Start: 2021-11-12 | End: 2023-09-26

## 2023-09-26 RX ORDER — SULFAMETHOXAZOLE AND TRIMETHOPRIM 400; 80 MG/1; MG/1
400-80 TABLET ORAL
Qty: 30 | Refills: 4 | Status: DISCONTINUED | COMMUNITY
Start: 2023-07-17 | End: 2023-09-26

## 2023-09-26 RX ORDER — CLOPIDOGREL BISULFATE 75 MG/1
75 TABLET, FILM COATED ORAL DAILY
Qty: 30 | Refills: 0 | Status: DISCONTINUED | COMMUNITY
Start: 2023-07-17 | End: 2023-09-26

## 2023-09-26 RX ORDER — CLOPIDOGREL BISULFATE 75 MG/1
75 TABLET, FILM COATED ORAL
Qty: 90 | Refills: 3 | Status: DISCONTINUED | COMMUNITY
Start: 2022-02-23 | End: 2023-09-26

## 2023-09-26 RX ORDER — SEVELAMER CARBONATE 800 MG/1
800 TABLET, FILM COATED ORAL 3 TIMES DAILY
Qty: 180 | Refills: 0 | Status: DISCONTINUED | COMMUNITY
Start: 2023-07-19 | End: 2023-09-26

## 2023-09-26 RX ORDER — ATORVASTATIN CALCIUM 40 MG/1
40 TABLET, FILM COATED ORAL
Qty: 30 | Refills: 0 | Status: DISCONTINUED | COMMUNITY
Start: 2017-05-08 | End: 2023-09-26

## 2023-09-26 RX ORDER — NYSTATIN 100000 [USP'U]/ML
100000 SUSPENSION ORAL 4 TIMES DAILY
Qty: 600 | Refills: 4 | Status: DISCONTINUED | COMMUNITY
Start: 2023-07-17 | End: 2023-09-26

## 2023-09-26 RX ORDER — VALGANCICLOVIR HYDROCHLORIDE 450 MG/1
450 TABLET ORAL
Qty: 30 | Refills: 3 | Status: DISCONTINUED | COMMUNITY
Start: 2023-07-17 | End: 2023-09-26

## 2023-09-26 RX ORDER — LISINOPRIL 2.5 MG/1
2.5 TABLET ORAL
Qty: 90 | Refills: 0 | Status: DISCONTINUED | COMMUNITY
Start: 2021-06-01 | End: 2023-09-26

## 2023-09-28 LAB — BKV DNA SPEC QL NAA+PROBE: NOT DETECTED IU/ML

## 2023-09-29 NOTE — PROVIDER CONTACT NOTE (MEDICATION) - REASON
is heparin order going to be changed?

## 2023-10-09 ENCOUNTER — APPOINTMENT (OUTPATIENT)
Dept: NEPHROLOGY | Facility: CLINIC | Age: 53
End: 2023-10-09
Payer: MEDICARE

## 2023-10-09 VITALS
HEIGHT: 69 IN | WEIGHT: 204 LBS | TEMPERATURE: 96.7 F | DIASTOLIC BLOOD PRESSURE: 64 MMHG | HEART RATE: 84 BPM | OXYGEN SATURATION: 100 % | SYSTOLIC BLOOD PRESSURE: 121 MMHG | BODY MASS INDEX: 30.21 KG/M2

## 2023-10-09 DIAGNOSIS — R60.0 LOCALIZED EDEMA: ICD-10-CM

## 2023-10-09 LAB
ALBUMIN SERPL ELPH-MCNC: 3.9 G/DL
ALP BLD-CCNC: 84 U/L
ALT SERPL-CCNC: 6 U/L
ANION GAP SERPL CALC-SCNC: 9 MMOL/L
APPEARANCE: CLEAR
AST SERPL-CCNC: 11 U/L
BACTERIA: ABNORMAL /HPF
BILIRUB SERPL-MCNC: 0.5 MG/DL
BILIRUBIN URINE: NEGATIVE
BLOOD URINE: ABNORMAL
BUN SERPL-MCNC: 23 MG/DL
CALCIUM SERPL-MCNC: 10.5 MG/DL
CAST: 0 /LPF
CHLORIDE SERPL-SCNC: 107 MMOL/L
CO2 SERPL-SCNC: 24 MMOL/L
COLOR: YELLOW
CREAT SERPL-MCNC: 1.17 MG/DL
CREAT SPEC-SCNC: 119 MG/DL
CREAT/PROT UR: 0.1 RATIO
EGFR: 75 ML/MIN/1.73M2
EPITHELIAL CELLS: 1 /HPF
GLUCOSE QUALITATIVE U: NEGATIVE MG/DL
GLUCOSE SERPL-MCNC: 141 MG/DL
HCT VFR BLD CALC: 31.2 %
HGB BLD-MCNC: 9.6 G/DL
KETONES URINE: NEGATIVE MG/DL
LEUKOCYTE ESTERASE URINE: NEGATIVE
MAGNESIUM SERPL-MCNC: 1.5 MG/DL
MCHC RBC-ENTMCNC: 29.6 PG
MCHC RBC-ENTMCNC: 30.8 GM/DL
MCV RBC AUTO: 96.3 FL
MICROSCOPIC-UA: NORMAL
NITRITE URINE: NEGATIVE
PH URINE: 6
PHOSPHATE SERPL-MCNC: 2 MG/DL
PLATELET # BLD AUTO: 179 K/UL
POTASSIUM SERPL-SCNC: 4.9 MMOL/L
PROT SERPL-MCNC: 5.9 G/DL
PROT UR-MCNC: 12 MG/DL
PROTEIN URINE: NEGATIVE MG/DL
RBC # BLD: 3.24 M/UL
RBC # FLD: 13.5 %
RED BLOOD CELLS URINE: 24 /HPF
SODIUM SERPL-SCNC: 140 MMOL/L
SPECIFIC GRAVITY URINE: 1.02
TACROLIMUS SERPL-MCNC: 10.9 NG/ML
URATE SERPL-MCNC: 5.2 MG/DL
UROBILINOGEN URINE: 1 MG/DL
WBC # FLD AUTO: 2.77 K/UL
WHITE BLOOD CELLS URINE: 5 /HPF

## 2023-10-09 PROCEDURE — 99214 OFFICE O/P EST MOD 30 MIN: CPT

## 2023-10-09 RX ORDER — MYCOPHENOLATE MOFETIL 500 MG/1
500 TABLET ORAL
Qty: 60 | Refills: 11 | Status: DISCONTINUED | COMMUNITY
Start: 2023-07-17 | End: 2023-10-09

## 2023-10-09 RX ORDER — FUROSEMIDE 40 MG/1
40 TABLET ORAL
Qty: 30 | Refills: 5 | Status: ACTIVE | COMMUNITY
Start: 2023-10-09 | End: 1900-01-01

## 2023-10-11 LAB — BKV DNA SPEC QL NAA+PROBE: NOT DETECTED IU/ML

## 2023-10-24 ENCOUNTER — APPOINTMENT (OUTPATIENT)
Dept: NEPHROLOGY | Facility: CLINIC | Age: 53
End: 2023-10-24
Payer: MEDICARE

## 2023-10-24 ENCOUNTER — LABORATORY RESULT (OUTPATIENT)
Age: 53
End: 2023-10-24

## 2023-10-24 VITALS
HEIGHT: 69 IN | SYSTOLIC BLOOD PRESSURE: 135 MMHG | HEART RATE: 96 BPM | BODY MASS INDEX: 29.62 KG/M2 | TEMPERATURE: 96.9 F | WEIGHT: 200 LBS | DIASTOLIC BLOOD PRESSURE: 76 MMHG | OXYGEN SATURATION: 100 %

## 2023-10-24 LAB
ALBUMIN SERPL ELPH-MCNC: 4.3 G/DL
ALP BLD-CCNC: 96 U/L
ALT SERPL-CCNC: 10 U/L
ANION GAP SERPL CALC-SCNC: 14 MMOL/L
AST SERPL-CCNC: 12 U/L
BASOPHILS # BLD AUTO: 0.05 K/UL
BASOPHILS NFR BLD AUTO: 1 %
BILIRUB SERPL-MCNC: 0.3 MG/DL
BUN SERPL-MCNC: 43 MG/DL
CALCIUM SERPL-MCNC: 10 MG/DL
CHLORIDE SERPL-SCNC: 108 MMOL/L
CO2 SERPL-SCNC: 26 MMOL/L
CREAT SERPL-MCNC: 2.01 MG/DL
CREAT SPEC-SCNC: 53 MG/DL
CREAT/PROT UR: 0.2 RATIO
EGFR: 39 ML/MIN/1.73M2
EOSINOPHIL # BLD AUTO: 0 K/UL
EOSINOPHIL NFR BLD AUTO: 0 %
GLUCOSE SERPL-MCNC: 83 MG/DL
HCT VFR BLD CALC: 33.3 %
HGB BLD-MCNC: 10.5 G/DL
IMM GRANULOCYTES NFR BLD AUTO: 1.6 %
LYMPHOCYTES # BLD AUTO: 0.23 K/UL
LYMPHOCYTES NFR BLD AUTO: 4.6 %
MAGNESIUM SERPL-MCNC: 2 MG/DL
MAN DIFF?: NORMAL
MCHC RBC-ENTMCNC: 29.1 PG
MCHC RBC-ENTMCNC: 31.5 GM/DL
MCV RBC AUTO: 92.2 FL
MONOCYTES # BLD AUTO: 0.83 K/UL
MONOCYTES NFR BLD AUTO: 16.8 %
NEUTROPHILS # BLD AUTO: 3.76 K/UL
NEUTROPHILS NFR BLD AUTO: 76 %
PHOSPHATE SERPL-MCNC: 3.7 MG/DL
PLATELET # BLD AUTO: 182 K/UL
POTASSIUM SERPL-SCNC: 4.3 MMOL/L
PROT SERPL-MCNC: 6.5 G/DL
PROT UR-MCNC: 10 MG/DL
RBC # BLD: 3.61 M/UL
RBC # FLD: 13.4 %
SODIUM SERPL-SCNC: 147 MMOL/L
TACROLIMUS SERPL-MCNC: 9.6 NG/ML
URATE SERPL-MCNC: 8.2 MG/DL
WBC # FLD AUTO: 4.95 K/UL

## 2023-10-24 PROCEDURE — 99214 OFFICE O/P EST MOD 30 MIN: CPT

## 2023-10-24 RX ORDER — BLOOD SUGAR DIAGNOSTIC
STRIP MISCELLANEOUS 4 TIMES DAILY
Qty: 2 | Refills: 10 | Status: ACTIVE | COMMUNITY
Start: 2023-10-24 | End: 1900-01-01

## 2023-10-25 LAB
APPEARANCE: CLEAR
BACTERIA: NEGATIVE /HPF
BILIRUBIN URINE: NEGATIVE
BKV DNA SPEC QL NAA+PROBE: NOT DETECTED IU/ML
BLOOD URINE: NEGATIVE
CAST: 0 /LPF
COLOR: YELLOW
EPITHELIAL CELLS: 0 /HPF
GLUCOSE QUALITATIVE U: NEGATIVE MG/DL
KETONES URINE: NEGATIVE MG/DL
LEUKOCYTE ESTERASE URINE: NEGATIVE
MICROSCOPIC-UA: NORMAL
NITRITE URINE: NEGATIVE
PH URINE: 7.5
PROTEIN URINE: NEGATIVE MG/DL
RED BLOOD CELLS URINE: 1 /HPF
SPECIFIC GRAVITY URINE: 1.01
UROBILINOGEN URINE: 0.2 MG/DL
WHITE BLOOD CELLS URINE: 2 /HPF

## 2023-10-30 NOTE — DISCHARGE NOTE PROVIDER - NSDCADMDATE_GEN_ALL_CORE_FT
13-Jan-2022 19:03
General Sunscreen Counseling: I recommended a broad spectrum sunscreen with a SPF of 30 or higher.  I explained that SPF 30 sunscreens block approximately 97 percent of the sun's harmful rays.  Sunscreens should be applied at least 15 minutes prior to expected sun exposure and then every 2 hours after that as long as sun exposure continues. If swimming or exercising sunscreen should be reapplied every 45 minutes to an hour after getting wet or sweating.  One ounce, or the equivalent of a shot glass full of sunscreen, is adequate to protect the skin not covered by a bathing suit. I also recommended a lip balm with a sunscreen as well. Sun protective clothing can be used in lieu of sunscreen but must be worn the entire time you are exposed to the sun's rays.
Detail Level: Generalized

## 2023-10-31 ENCOUNTER — APPOINTMENT (OUTPATIENT)
Dept: TRANSPLANT | Facility: CLINIC | Age: 53
End: 2023-10-31

## 2023-10-31 ENCOUNTER — LABORATORY RESULT (OUTPATIENT)
Age: 53
End: 2023-10-31

## 2023-11-02 ENCOUNTER — RESULT CHARGE (OUTPATIENT)
Age: 53
End: 2023-11-02

## 2023-11-02 ENCOUNTER — OUTPATIENT (OUTPATIENT)
Dept: OUTPATIENT SERVICES | Facility: HOSPITAL | Age: 53
LOS: 1 days | End: 2023-11-02
Payer: MEDICARE

## 2023-11-02 ENCOUNTER — APPOINTMENT (OUTPATIENT)
Dept: ENDOCRINOLOGY | Facility: HOSPITAL | Age: 53
End: 2023-11-02
Payer: MEDICARE

## 2023-11-02 VITALS
HEART RATE: 84 BPM | RESPIRATION RATE: 14 BRPM | TEMPERATURE: 98 F | WEIGHT: 200 LBS | SYSTOLIC BLOOD PRESSURE: 145 MMHG | BODY MASS INDEX: 29.62 KG/M2 | HEIGHT: 69 IN | DIASTOLIC BLOOD PRESSURE: 63 MMHG

## 2023-11-02 DIAGNOSIS — Z89.422 ACQUIRED ABSENCE OF OTHER LEFT TOE(S): Chronic | ICD-10-CM

## 2023-11-02 DIAGNOSIS — Z95.810 PRESENCE OF AUTOMATIC (IMPLANTABLE) CARDIAC DEFIBRILLATOR: Chronic | ICD-10-CM

## 2023-11-02 DIAGNOSIS — E34.9 ENDOCRINE DISORDER, UNSPECIFIED: ICD-10-CM

## 2023-11-02 LAB
24R-OH-CALCIDIOL SERPL-MCNC: 18 NG/ML — LOW (ref 30–80)
24R-OH-CALCIDIOL SERPL-MCNC: 18 NG/ML — LOW (ref 30–80)
A1C WITH ESTIMATED AVERAGE GLUCOSE RESULT: 6.3 % — HIGH (ref 4–5.6)
A1C WITH ESTIMATED AVERAGE GLUCOSE RESULT: 6.3 % — HIGH (ref 4–5.6)
ANION GAP SERPL CALC-SCNC: 10 MMOL/L — SIGNIFICANT CHANGE UP (ref 5–17)
ANION GAP SERPL CALC-SCNC: 10 MMOL/L — SIGNIFICANT CHANGE UP (ref 5–17)
BUN SERPL-MCNC: 36 MG/DL — HIGH (ref 7–23)
BUN SERPL-MCNC: 36 MG/DL — HIGH (ref 7–23)
CALCIUM SERPL-MCNC: 10.6 MG/DL — HIGH (ref 8.4–10.5)
CHLORIDE SERPL-SCNC: 109 MMOL/L — HIGH (ref 96–108)
CHLORIDE SERPL-SCNC: 109 MMOL/L — HIGH (ref 96–108)
CO2 SERPL-SCNC: 22 MMOL/L — SIGNIFICANT CHANGE UP (ref 22–31)
CO2 SERPL-SCNC: 22 MMOL/L — SIGNIFICANT CHANGE UP (ref 22–31)
CREAT SERPL-MCNC: 1.23 MG/DL — SIGNIFICANT CHANGE UP (ref 0.5–1.3)
CREAT SERPL-MCNC: 1.23 MG/DL — SIGNIFICANT CHANGE UP (ref 0.5–1.3)
EGFR: 71 ML/MIN/1.73M2 — SIGNIFICANT CHANGE UP
EGFR: 71 ML/MIN/1.73M2 — SIGNIFICANT CHANGE UP
ESTIMATED AVERAGE GLUCOSE: 134 MG/DL — HIGH (ref 68–114)
ESTIMATED AVERAGE GLUCOSE: 134 MG/DL — HIGH (ref 68–114)
GLUCOSE BLDC GLUCOMTR-MCNC: 127
GLUCOSE BLDC GLUCOMTR-MCNC: 127 MG/DL — HIGH (ref 70–99)
GLUCOSE BLDC GLUCOMTR-MCNC: 127 MG/DL — HIGH (ref 70–99)
GLUCOSE SERPL-MCNC: 147 MG/DL — HIGH (ref 70–99)
GLUCOSE SERPL-MCNC: 147 MG/DL — HIGH (ref 70–99)
PHOSPHATE SERPL-MCNC: 1.9 MG/DL — LOW (ref 2.5–4.5)
PHOSPHATE SERPL-MCNC: 1.9 MG/DL — LOW (ref 2.5–4.5)
POTASSIUM SERPL-MCNC: 5.5 MMOL/L — HIGH (ref 3.5–5.3)
POTASSIUM SERPL-MCNC: 5.5 MMOL/L — HIGH (ref 3.5–5.3)
POTASSIUM SERPL-SCNC: 5.5 MMOL/L — HIGH (ref 3.5–5.3)
POTASSIUM SERPL-SCNC: 5.5 MMOL/L — HIGH (ref 3.5–5.3)
PTH-INTACT FLD-MCNC: 220 PG/ML — HIGH (ref 15–65)
PTH-INTACT FLD-MCNC: 220 PG/ML — HIGH (ref 15–65)
SODIUM SERPL-SCNC: 142 MMOL/L — SIGNIFICANT CHANGE UP (ref 135–145)
SODIUM SERPL-SCNC: 142 MMOL/L — SIGNIFICANT CHANGE UP (ref 135–145)

## 2023-11-02 PROCEDURE — 82310 ASSAY OF CALCIUM: CPT

## 2023-11-02 PROCEDURE — 83970 ASSAY OF PARATHORMONE: CPT

## 2023-11-02 PROCEDURE — 99214 OFFICE O/P EST MOD 30 MIN: CPT | Mod: GC

## 2023-11-02 PROCEDURE — 82962 GLUCOSE BLOOD TEST: CPT

## 2023-11-02 PROCEDURE — 36415 COLL VENOUS BLD VENIPUNCTURE: CPT

## 2023-11-02 PROCEDURE — 84100 ASSAY OF PHOSPHORUS: CPT

## 2023-11-02 PROCEDURE — G0463: CPT

## 2023-11-02 PROCEDURE — 80048 BASIC METABOLIC PNL TOTAL CA: CPT

## 2023-11-02 PROCEDURE — 83036 HEMOGLOBIN GLYCOSYLATED A1C: CPT

## 2023-11-02 PROCEDURE — 82306 VITAMIN D 25 HYDROXY: CPT

## 2023-11-02 RX ORDER — FLASH GLUCOSE SCANNING READER
EACH MISCELLANEOUS
Qty: 1 | Refills: 3 | Status: ACTIVE | COMMUNITY
Start: 2023-11-02 | End: 1900-01-01

## 2023-11-02 RX ORDER — FLASH GLUCOSE SENSOR
KIT MISCELLANEOUS
Qty: 2 | Refills: 5 | Status: ACTIVE | COMMUNITY
Start: 2023-11-02 | End: 1900-01-01

## 2023-11-06 ENCOUNTER — APPOINTMENT (OUTPATIENT)
Dept: NEPHROLOGY | Facility: CLINIC | Age: 53
End: 2023-11-06
Payer: MEDICARE

## 2023-11-06 VITALS
HEART RATE: 86 BPM | DIASTOLIC BLOOD PRESSURE: 76 MMHG | WEIGHT: 199 LBS | TEMPERATURE: 97.8 F | SYSTOLIC BLOOD PRESSURE: 161 MMHG | BODY MASS INDEX: 29.47 KG/M2 | HEIGHT: 69 IN | OXYGEN SATURATION: 100 %

## 2023-11-06 DIAGNOSIS — Z94.0 KIDNEY TRANSPLANT STATUS: ICD-10-CM

## 2023-11-06 DIAGNOSIS — E11.9 TYPE 2 DIABETES MELLITUS WITHOUT COMPLICATIONS: ICD-10-CM

## 2023-11-06 LAB
ALBUMIN SERPL ELPH-MCNC: 4.4 G/DL
ALP BLD-CCNC: 105 U/L
ALT SERPL-CCNC: 9 U/L
ANION GAP SERPL CALC-SCNC: 11 MMOL/L
APPEARANCE: CLEAR
AST SERPL-CCNC: 11 U/L
BACTERIA: NEGATIVE /HPF
BASOPHILS # BLD AUTO: 0.05 K/UL
BASOPHILS NFR BLD AUTO: 1.1 %
BILIRUB SERPL-MCNC: 0.4 MG/DL
BILIRUBIN URINE: NEGATIVE
BKV DNA SPEC QL NAA+PROBE: NOT DETECTED IU/ML
BLOOD URINE: NEGATIVE
BUN SERPL-MCNC: 29 MG/DL
CALCIUM SERPL-MCNC: 10.6 MG/DL
CAST: 0 /LPF
CHLORIDE SERPL-SCNC: 109 MMOL/L
CMV DNA SPEC QL NAA+PROBE: NOT DETECTED IU/ML
CMVPCR LOG: NOT DETECTED LOG10IU/ML
CO2 SERPL-SCNC: 21 MMOL/L
COLOR: YELLOW
CREAT SERPL-MCNC: 1.36 MG/DL
CREAT SPEC-SCNC: 101 MG/DL
CREAT/PROT UR: 0.1 RATIO
EGFR: 63 ML/MIN/1.73M2
EOSINOPHIL # BLD AUTO: 0 K/UL
EOSINOPHIL NFR BLD AUTO: 0 %
EPITHELIAL CELLS: 1 /HPF
GLUCOSE QUALITATIVE U: NEGATIVE MG/DL
GLUCOSE SERPL-MCNC: 146 MG/DL
HCT VFR BLD CALC: 33.3 %
HGB BLD-MCNC: 10.5 G/DL
IMM GRANULOCYTES NFR BLD AUTO: 4.8 %
KETONES URINE: NEGATIVE MG/DL
LEUKOCYTE ESTERASE URINE: ABNORMAL
LYMPHOCYTES # BLD AUTO: 0.23 K/UL
LYMPHOCYTES NFR BLD AUTO: 5 %
MAGNESIUM SERPL-MCNC: 1.6 MG/DL
MAN DIFF?: NORMAL
MCHC RBC-ENTMCNC: 29.2 PG
MCHC RBC-ENTMCNC: 31.5 GM/DL
MCV RBC AUTO: 92.5 FL
MICROSCOPIC-UA: NORMAL
MONOCYTES # BLD AUTO: 0.48 K/UL
MONOCYTES NFR BLD AUTO: 10.5 %
NEUTROPHILS # BLD AUTO: 3.58 K/UL
NEUTROPHILS NFR BLD AUTO: 78.6 %
NITRITE URINE: NEGATIVE
PH URINE: 6
PHOSPHATE SERPL-MCNC: 1.8 MG/DL
PLATELET # BLD AUTO: 179 K/UL
POTASSIUM SERPL-SCNC: 5.1 MMOL/L
PROT SERPL-MCNC: 6.1 G/DL
PROT UR-MCNC: 8 MG/DL
PROTEIN URINE: NEGATIVE MG/DL
RBC # BLD: 3.6 M/UL
RBC # FLD: 13.2 %
RED BLOOD CELLS URINE: 1 /HPF
SODIUM SERPL-SCNC: 141 MMOL/L
SPECIFIC GRAVITY URINE: 1.02
TACROLIMUS SERPL-MCNC: 8.5 NG/ML
URATE SERPL-MCNC: 6.2 MG/DL
UROBILINOGEN URINE: 0.2 MG/DL
WBC # FLD AUTO: 4.56 K/UL
WHITE BLOOD CELLS URINE: 4 /HPF

## 2023-11-06 PROCEDURE — 99214 OFFICE O/P EST MOD 30 MIN: CPT

## 2023-11-06 RX ORDER — INSULIN LISPRO 100 [IU]/ML
100 INJECTION, SOLUTION INTRAVENOUS; SUBCUTANEOUS
Qty: 1 | Refills: 0 | Status: ACTIVE | COMMUNITY
Start: 2023-07-17

## 2023-11-06 RX ORDER — ASPIRIN 81 MG/1
81 TABLET, FILM COATED ORAL DAILY
Qty: 30 | Refills: 0 | Status: ACTIVE | COMMUNITY
Start: 2023-07-17 | End: 1900-01-01

## 2023-11-06 RX ORDER — OMEGA-3/DHA/EPA/FISH OIL 300-1000MG
400 CAPSULE ORAL
Qty: 60 | Refills: 3 | Status: DISCONTINUED | COMMUNITY
Start: 2023-09-06 | End: 2023-11-06

## 2023-11-06 RX ORDER — PREDNISONE 5 MG/1
5 TABLET ORAL
Qty: 42 | Refills: 0 | Status: DISCONTINUED | COMMUNITY
Start: 2023-07-17 | End: 2023-11-06

## 2023-11-06 RX ORDER — SULFAMETHOXAZOLE AND TRIMETHOPRIM 400; 80 MG/1; MG/1
400-80 TABLET ORAL
Qty: 30 | Refills: 11 | Status: ACTIVE | COMMUNITY
Start: 2023-11-06 | End: 1900-01-01

## 2023-11-08 RX ORDER — DIBASIC SODIUM PHOSPHATE, MONOBASIC POTASSIUM PHOSPHATE AND MONOBASIC SODIUM PHOSPHATE 852; 155; 130 MG/1; MG/1; MG/1
155-852-130 TABLET ORAL
Qty: 120 | Refills: 1 | Status: ACTIVE | COMMUNITY
Start: 2023-08-28 | End: 1900-01-01

## 2023-11-08 RX ORDER — VALGANCICLOVIR HYDROCHLORIDE 450 MG/1
450 TABLET ORAL
Qty: 30 | Refills: 3 | Status: ACTIVE | COMMUNITY
Start: 2023-11-06 | End: 1900-01-01

## 2023-11-08 RX ORDER — APIXABAN 5 MG/1
5 TABLET, FILM COATED ORAL
Qty: 60 | Refills: 2 | Status: ACTIVE | COMMUNITY
Start: 2023-07-20 | End: 1900-01-01

## 2023-11-10 NOTE — ED PROVIDER NOTE - ENMT NEGATIVE STATEMENT, MLM
Ears: no ear pain and no hearing problems.Nose: no nasal congestion and no nasal drainage.Mouth/Throat: no dysphagia, no hoarseness and no throat pain.Neck: no lumps, no pain, no stiffness and no swollen glands.
104

## 2023-11-16 RX ORDER — MULTIVIT-MIN/FOLIC/VIT K/LYCOP 400-300MCG
50 MCG TABLET ORAL
Qty: 90 | Refills: 3 | Status: ACTIVE | COMMUNITY
Start: 2023-11-16 | End: 1900-01-01

## 2023-11-16 RX ORDER — INSULIN GLARGINE 100 [IU]/ML
100 INJECTION, SOLUTION SUBCUTANEOUS
Qty: 5 | Refills: 3 | Status: ACTIVE | COMMUNITY
Start: 2023-07-17 | End: 1900-01-01

## 2023-11-22 ENCOUNTER — APPOINTMENT (OUTPATIENT)
Dept: NEPHROLOGY | Facility: CLINIC | Age: 53
End: 2023-11-22
Payer: MEDICARE

## 2023-11-22 VITALS
DIASTOLIC BLOOD PRESSURE: 74 MMHG | WEIGHT: 199 LBS | TEMPERATURE: 98 F | HEIGHT: 69 IN | SYSTOLIC BLOOD PRESSURE: 138 MMHG | OXYGEN SATURATION: 100 % | HEART RATE: 81 BPM | BODY MASS INDEX: 29.47 KG/M2

## 2023-11-22 DIAGNOSIS — E78.5 HYPERLIPIDEMIA, UNSPECIFIED: ICD-10-CM

## 2023-11-22 LAB
ALBUMIN SERPL ELPH-MCNC: 4 G/DL
ALP BLD-CCNC: 113 U/L
ALT SERPL-CCNC: 6 U/L
ANION GAP SERPL CALC-SCNC: 8 MMOL/L
APPEARANCE: CLEAR
AST SERPL-CCNC: 10 U/L
BACTERIA: ABNORMAL /HPF
BILIRUB SERPL-MCNC: 0.3 MG/DL
BILIRUBIN URINE: NEGATIVE
BLOOD URINE: NEGATIVE
BUN SERPL-MCNC: 31 MG/DL
CALCIUM SERPL-MCNC: 10.4 MG/DL
CAST: 0 /LPF
CHLORIDE SERPL-SCNC: 110 MMOL/L
CO2 SERPL-SCNC: 23 MMOL/L
COLOR: YELLOW
CREAT SERPL-MCNC: 1.23 MG/DL
CREAT SPEC-SCNC: 110 MG/DL
CREAT/PROT UR: 0.1 RATIO
EGFR: 71 ML/MIN/1.73M2
EPITHELIAL CELLS: 0 /HPF
GLUCOSE QUALITATIVE U: NEGATIVE MG/DL
GLUCOSE SERPL-MCNC: 143 MG/DL
HCT VFR BLD CALC: 33.6 %
HGB BLD-MCNC: 10.3 G/DL
KETONES URINE: NEGATIVE MG/DL
LDH SERPL-CCNC: 200 U/L
LEUKOCYTE ESTERASE URINE: NEGATIVE
MAGNESIUM SERPL-MCNC: 1.7 MG/DL
MCHC RBC-ENTMCNC: 28.6 PG
MCHC RBC-ENTMCNC: 30.7 GM/DL
MCV RBC AUTO: 93.3 FL
MICROSCOPIC-UA: NORMAL
NITRITE URINE: NEGATIVE
PH URINE: 6
PHOSPHATE SERPL-MCNC: 2.5 MG/DL
PLATELET # BLD AUTO: 181 K/UL
POTASSIUM SERPL-SCNC: 5.7 MMOL/L
PROT SERPL-MCNC: 6 G/DL
PROT UR-MCNC: 9 MG/DL
PROTEIN URINE: NEGATIVE MG/DL
RBC # BLD: 3.6 M/UL
RBC # FLD: 13.7 %
RED BLOOD CELLS URINE: 1 /HPF
SODIUM SERPL-SCNC: 141 MMOL/L
SPECIFIC GRAVITY URINE: 1.02
TACROLIMUS SERPL-MCNC: 9.3 NG/ML
URATE SERPL-MCNC: 5.7 MG/DL
UROBILINOGEN URINE: 1 MG/DL
WBC # FLD AUTO: 2.33 K/UL
WHITE BLOOD CELLS URINE: 3 /HPF

## 2023-11-22 PROCEDURE — 99214 OFFICE O/P EST MOD 30 MIN: CPT

## 2023-11-23 PROBLEM — E78.5 HYPERLIPIDEMIA, UNSPECIFIED HYPERLIPIDEMIA TYPE: Status: ACTIVE | Noted: 2022-09-22

## 2023-11-26 LAB — BKV DNA SPEC QL NAA+PROBE: NOT DETECTED IU/ML

## 2023-12-06 ENCOUNTER — INPATIENT (INPATIENT)
Facility: HOSPITAL | Age: 53
LOS: 9 days | Discharge: HOME HEALTH | DRG: 617 | End: 2023-12-16
Attending: TRANSPLANT SURGERY | Admitting: SURGERY
Payer: MEDICARE

## 2023-12-06 VITALS
OXYGEN SATURATION: 99 % | SYSTOLIC BLOOD PRESSURE: 143 MMHG | DIASTOLIC BLOOD PRESSURE: 65 MMHG | TEMPERATURE: 98 F | HEART RATE: 80 BPM | RESPIRATION RATE: 20 BRPM | WEIGHT: 197.09 LBS | HEIGHT: 69 IN

## 2023-12-06 DIAGNOSIS — Z89.422 ACQUIRED ABSENCE OF OTHER LEFT TOE(S): Chronic | ICD-10-CM

## 2023-12-06 DIAGNOSIS — M86.9 OSTEOMYELITIS, UNSPECIFIED: ICD-10-CM

## 2023-12-06 DIAGNOSIS — I77.1 STRICTURE OF ARTERY: ICD-10-CM

## 2023-12-06 DIAGNOSIS — Z95.1 PRESENCE OF AORTOCORONARY BYPASS GRAFT: Chronic | ICD-10-CM

## 2023-12-06 DIAGNOSIS — Z95.810 PRESENCE OF AUTOMATIC (IMPLANTABLE) CARDIAC DEFIBRILLATOR: Chronic | ICD-10-CM

## 2023-12-06 LAB
ALBUMIN SERPL ELPH-MCNC: 3.7 G/DL — SIGNIFICANT CHANGE UP (ref 3.3–5)
ALBUMIN SERPL ELPH-MCNC: 3.7 G/DL — SIGNIFICANT CHANGE UP (ref 3.3–5)
ALP SERPL-CCNC: 97 U/L — SIGNIFICANT CHANGE UP (ref 40–120)
ALP SERPL-CCNC: 97 U/L — SIGNIFICANT CHANGE UP (ref 40–120)
ALT FLD-CCNC: 10 U/L — SIGNIFICANT CHANGE UP (ref 10–45)
ALT FLD-CCNC: 10 U/L — SIGNIFICANT CHANGE UP (ref 10–45)
ANION GAP SERPL CALC-SCNC: 10 MMOL/L — SIGNIFICANT CHANGE UP (ref 5–17)
ANION GAP SERPL CALC-SCNC: 10 MMOL/L — SIGNIFICANT CHANGE UP (ref 5–17)
AST SERPL-CCNC: 11 U/L — SIGNIFICANT CHANGE UP (ref 10–40)
AST SERPL-CCNC: 11 U/L — SIGNIFICANT CHANGE UP (ref 10–40)
BASOPHILS # BLD AUTO: 0.05 K/UL — SIGNIFICANT CHANGE UP (ref 0–0.2)
BASOPHILS # BLD AUTO: 0.05 K/UL — SIGNIFICANT CHANGE UP (ref 0–0.2)
BASOPHILS NFR BLD AUTO: 2.1 % — HIGH (ref 0–2)
BASOPHILS NFR BLD AUTO: 2.1 % — HIGH (ref 0–2)
BILIRUB SERPL-MCNC: 0.3 MG/DL — SIGNIFICANT CHANGE UP (ref 0.2–1.2)
BILIRUB SERPL-MCNC: 0.3 MG/DL — SIGNIFICANT CHANGE UP (ref 0.2–1.2)
BUN SERPL-MCNC: 29 MG/DL — HIGH (ref 7–23)
BUN SERPL-MCNC: 29 MG/DL — HIGH (ref 7–23)
CALCIUM SERPL-MCNC: 10.3 MG/DL — SIGNIFICANT CHANGE UP (ref 8.4–10.5)
CALCIUM SERPL-MCNC: 10.3 MG/DL — SIGNIFICANT CHANGE UP (ref 8.4–10.5)
CHLORIDE SERPL-SCNC: 108 MMOL/L — SIGNIFICANT CHANGE UP (ref 96–108)
CHLORIDE SERPL-SCNC: 108 MMOL/L — SIGNIFICANT CHANGE UP (ref 96–108)
CO2 SERPL-SCNC: 24 MMOL/L — SIGNIFICANT CHANGE UP (ref 22–31)
CO2 SERPL-SCNC: 24 MMOL/L — SIGNIFICANT CHANGE UP (ref 22–31)
CREAT SERPL-MCNC: 1 MG/DL — SIGNIFICANT CHANGE UP (ref 0.5–1.3)
CREAT SERPL-MCNC: 1 MG/DL — SIGNIFICANT CHANGE UP (ref 0.5–1.3)
CRP SERPL-MCNC: 33 MG/L — HIGH (ref 0–4)
CRP SERPL-MCNC: 33 MG/L — HIGH (ref 0–4)
DACRYOCYTES BLD QL SMEAR: SLIGHT — SIGNIFICANT CHANGE UP
DACRYOCYTES BLD QL SMEAR: SLIGHT — SIGNIFICANT CHANGE UP
EGFR: 91 ML/MIN/1.73M2 — SIGNIFICANT CHANGE UP
EGFR: 91 ML/MIN/1.73M2 — SIGNIFICANT CHANGE UP
ELLIPTOCYTES BLD QL SMEAR: SLIGHT — SIGNIFICANT CHANGE UP
ELLIPTOCYTES BLD QL SMEAR: SLIGHT — SIGNIFICANT CHANGE UP
EOSINOPHIL # BLD AUTO: 0.05 K/UL — SIGNIFICANT CHANGE UP (ref 0–0.5)
EOSINOPHIL # BLD AUTO: 0.05 K/UL — SIGNIFICANT CHANGE UP (ref 0–0.5)
EOSINOPHIL NFR BLD AUTO: 2.1 % — SIGNIFICANT CHANGE UP (ref 0–6)
EOSINOPHIL NFR BLD AUTO: 2.1 % — SIGNIFICANT CHANGE UP (ref 0–6)
GLUCOSE BLDC GLUCOMTR-MCNC: 102 MG/DL — HIGH (ref 70–99)
GLUCOSE BLDC GLUCOMTR-MCNC: 102 MG/DL — HIGH (ref 70–99)
GLUCOSE BLDC GLUCOMTR-MCNC: 117 MG/DL — HIGH (ref 70–99)
GLUCOSE BLDC GLUCOMTR-MCNC: 117 MG/DL — HIGH (ref 70–99)
GLUCOSE BLDC GLUCOMTR-MCNC: 76 MG/DL — SIGNIFICANT CHANGE UP (ref 70–99)
GLUCOSE BLDC GLUCOMTR-MCNC: 76 MG/DL — SIGNIFICANT CHANGE UP (ref 70–99)
GLUCOSE BLDC GLUCOMTR-MCNC: 81 MG/DL — SIGNIFICANT CHANGE UP (ref 70–99)
GLUCOSE BLDC GLUCOMTR-MCNC: 81 MG/DL — SIGNIFICANT CHANGE UP (ref 70–99)
GLUCOSE SERPL-MCNC: 87 MG/DL — SIGNIFICANT CHANGE UP (ref 70–99)
GLUCOSE SERPL-MCNC: 87 MG/DL — SIGNIFICANT CHANGE UP (ref 70–99)
HCT VFR BLD CALC: 32.9 % — LOW (ref 39–50)
HCT VFR BLD CALC: 32.9 % — LOW (ref 39–50)
HGB BLD-MCNC: 10.2 G/DL — LOW (ref 13–17)
HGB BLD-MCNC: 10.2 G/DL — LOW (ref 13–17)
LYMPHOCYTES # BLD AUTO: 0.18 K/UL — LOW (ref 1–3.3)
LYMPHOCYTES # BLD AUTO: 0.18 K/UL — LOW (ref 1–3.3)
LYMPHOCYTES # BLD AUTO: 7.4 % — LOW (ref 13–44)
LYMPHOCYTES # BLD AUTO: 7.4 % — LOW (ref 13–44)
MANUAL SMEAR VERIFICATION: SIGNIFICANT CHANGE UP
MANUAL SMEAR VERIFICATION: SIGNIFICANT CHANGE UP
MCHC RBC-ENTMCNC: 28.3 PG — SIGNIFICANT CHANGE UP (ref 27–34)
MCHC RBC-ENTMCNC: 28.3 PG — SIGNIFICANT CHANGE UP (ref 27–34)
MCHC RBC-ENTMCNC: 31 GM/DL — LOW (ref 32–36)
MCHC RBC-ENTMCNC: 31 GM/DL — LOW (ref 32–36)
MCV RBC AUTO: 91.4 FL — SIGNIFICANT CHANGE UP (ref 80–100)
MCV RBC AUTO: 91.4 FL — SIGNIFICANT CHANGE UP (ref 80–100)
MONOCYTES # BLD AUTO: 0.4 K/UL — SIGNIFICANT CHANGE UP (ref 0–0.9)
MONOCYTES # BLD AUTO: 0.4 K/UL — SIGNIFICANT CHANGE UP (ref 0–0.9)
MONOCYTES NFR BLD AUTO: 16.8 % — HIGH (ref 2–14)
MONOCYTES NFR BLD AUTO: 16.8 % — HIGH (ref 2–14)
NEUTROPHILS # BLD AUTO: 1.72 K/UL — LOW (ref 1.8–7.4)
NEUTROPHILS # BLD AUTO: 1.72 K/UL — LOW (ref 1.8–7.4)
NEUTROPHILS NFR BLD AUTO: 70.5 % — SIGNIFICANT CHANGE UP (ref 43–77)
NEUTROPHILS NFR BLD AUTO: 70.5 % — SIGNIFICANT CHANGE UP (ref 43–77)
NEUTS BAND # BLD: 1.1 % — SIGNIFICANT CHANGE UP (ref 0–8)
NEUTS BAND # BLD: 1.1 % — SIGNIFICANT CHANGE UP (ref 0–8)
PLAT MORPH BLD: NORMAL — SIGNIFICANT CHANGE UP
PLAT MORPH BLD: NORMAL — SIGNIFICANT CHANGE UP
PLATELET # BLD AUTO: 168 K/UL — SIGNIFICANT CHANGE UP (ref 150–400)
PLATELET # BLD AUTO: 168 K/UL — SIGNIFICANT CHANGE UP (ref 150–400)
POIKILOCYTOSIS BLD QL AUTO: SIGNIFICANT CHANGE UP
POIKILOCYTOSIS BLD QL AUTO: SIGNIFICANT CHANGE UP
POTASSIUM SERPL-MCNC: 4.2 MMOL/L — SIGNIFICANT CHANGE UP (ref 3.5–5.3)
POTASSIUM SERPL-MCNC: 4.2 MMOL/L — SIGNIFICANT CHANGE UP (ref 3.5–5.3)
POTASSIUM SERPL-SCNC: 4.2 MMOL/L — SIGNIFICANT CHANGE UP (ref 3.5–5.3)
POTASSIUM SERPL-SCNC: 4.2 MMOL/L — SIGNIFICANT CHANGE UP (ref 3.5–5.3)
PROT SERPL-MCNC: 6.3 G/DL — SIGNIFICANT CHANGE UP (ref 6–8.3)
PROT SERPL-MCNC: 6.3 G/DL — SIGNIFICANT CHANGE UP (ref 6–8.3)
RBC # BLD: 3.6 M/UL — LOW (ref 4.2–5.8)
RBC # BLD: 3.6 M/UL — LOW (ref 4.2–5.8)
RBC # FLD: 13.1 % — SIGNIFICANT CHANGE UP (ref 10.3–14.5)
RBC # FLD: 13.1 % — SIGNIFICANT CHANGE UP (ref 10.3–14.5)
RBC BLD AUTO: ABNORMAL
RBC BLD AUTO: ABNORMAL
SODIUM SERPL-SCNC: 142 MMOL/L — SIGNIFICANT CHANGE UP (ref 135–145)
SODIUM SERPL-SCNC: 142 MMOL/L — SIGNIFICANT CHANGE UP (ref 135–145)
WBC # BLD: 2.4 K/UL — LOW (ref 3.8–10.5)
WBC # BLD: 2.4 K/UL — LOW (ref 3.8–10.5)
WBC # FLD AUTO: 2.4 K/UL — LOW (ref 3.8–10.5)
WBC # FLD AUTO: 2.4 K/UL — LOW (ref 3.8–10.5)

## 2023-12-06 PROCEDURE — 99253 IP/OBS CNSLTJ NEW/EST LOW 45: CPT

## 2023-12-06 PROCEDURE — 93010 ELECTROCARDIOGRAM REPORT: CPT

## 2023-12-06 PROCEDURE — 99221 1ST HOSP IP/OBS SF/LOW 40: CPT

## 2023-12-06 PROCEDURE — 99222 1ST HOSP IP/OBS MODERATE 55: CPT

## 2023-12-06 PROCEDURE — 73630 X-RAY EXAM OF FOOT: CPT | Mod: 26,50

## 2023-12-06 PROCEDURE — 99223 1ST HOSP IP/OBS HIGH 75: CPT

## 2023-12-06 PROCEDURE — 99285 EMERGENCY DEPT VISIT HI MDM: CPT

## 2023-12-06 RX ORDER — PIPERACILLIN AND TAZOBACTAM 4; .5 G/20ML; G/20ML
3.38 INJECTION, POWDER, LYOPHILIZED, FOR SOLUTION INTRAVENOUS ONCE
Refills: 0 | Status: COMPLETED | OUTPATIENT
Start: 2023-12-06 | End: 2023-12-06

## 2023-12-06 RX ORDER — ATORVASTATIN CALCIUM 80 MG/1
40 TABLET, FILM COATED ORAL AT BEDTIME
Refills: 0 | Status: DISCONTINUED | OUTPATIENT
Start: 2023-12-06 | End: 2023-12-11

## 2023-12-06 RX ORDER — CARVEDILOL PHOSPHATE 80 MG/1
3.12 CAPSULE, EXTENDED RELEASE ORAL
Refills: 0 | Status: DISCONTINUED | OUTPATIENT
Start: 2023-12-06 | End: 2023-12-06

## 2023-12-06 RX ORDER — TACROLIMUS 5 MG/1
5 CAPSULE ORAL
Refills: 0 | Status: DISCONTINUED | OUTPATIENT
Start: 2023-12-07 | End: 2023-12-09

## 2023-12-06 RX ORDER — PIPERACILLIN AND TAZOBACTAM 4; .5 G/20ML; G/20ML
3.38 INJECTION, POWDER, LYOPHILIZED, FOR SOLUTION INTRAVENOUS EVERY 8 HOURS
Refills: 0 | Status: DISCONTINUED | OUTPATIENT
Start: 2023-12-07 | End: 2023-12-10

## 2023-12-06 RX ORDER — MYCOPHENOLATE MOFETIL 250 MG/1
1000 CAPSULE ORAL EVERY 12 HOURS
Refills: 0 | Status: DISCONTINUED | OUTPATIENT
Start: 2023-12-06 | End: 2023-12-06

## 2023-12-06 RX ORDER — FAMOTIDINE 10 MG/ML
20 INJECTION INTRAVENOUS DAILY
Refills: 0 | Status: DISCONTINUED | OUTPATIENT
Start: 2023-12-07 | End: 2023-12-11

## 2023-12-06 RX ORDER — INSULIN LISPRO 100/ML
VIAL (ML) SUBCUTANEOUS
Refills: 0 | Status: DISCONTINUED | OUTPATIENT
Start: 2023-12-06 | End: 2023-12-11

## 2023-12-06 RX ORDER — INSULIN LISPRO 100/ML
10 VIAL (ML) SUBCUTANEOUS
Refills: 0 | Status: DISCONTINUED | OUTPATIENT
Start: 2023-12-06 | End: 2023-12-11

## 2023-12-06 RX ORDER — VANCOMYCIN HCL 1 G
1000 VIAL (EA) INTRAVENOUS ONCE
Refills: 0 | Status: DISCONTINUED | OUTPATIENT
Start: 2023-12-06 | End: 2023-12-06

## 2023-12-06 RX ORDER — CARVEDILOL PHOSPHATE 80 MG/1
3.12 CAPSULE, EXTENDED RELEASE ORAL ONCE
Refills: 0 | Status: COMPLETED | OUTPATIENT
Start: 2023-12-06 | End: 2023-12-06

## 2023-12-06 RX ORDER — PIPERACILLIN AND TAZOBACTAM 4; .5 G/20ML; G/20ML
3.38 INJECTION, POWDER, LYOPHILIZED, FOR SOLUTION INTRAVENOUS ONCE
Refills: 0 | Status: DISCONTINUED | OUTPATIENT
Start: 2023-12-06 | End: 2023-12-06

## 2023-12-06 RX ORDER — ASPIRIN/CALCIUM CARB/MAGNESIUM 324 MG
81 TABLET ORAL DAILY
Refills: 0 | Status: DISCONTINUED | OUTPATIENT
Start: 2023-12-06 | End: 2023-12-11

## 2023-12-06 RX ORDER — CARVEDILOL PHOSPHATE 80 MG/1
6.25 CAPSULE, EXTENDED RELEASE ORAL EVERY 12 HOURS
Refills: 0 | Status: DISCONTINUED | OUTPATIENT
Start: 2023-12-07 | End: 2023-12-07

## 2023-12-06 RX ORDER — INSULIN GLARGINE 100 [IU]/ML
17 INJECTION, SOLUTION SUBCUTANEOUS AT BEDTIME
Refills: 0 | Status: DISCONTINUED | OUTPATIENT
Start: 2023-12-06 | End: 2023-12-08

## 2023-12-06 RX ORDER — INSULIN GLARGINE 100 [IU]/ML
21 INJECTION, SOLUTION SUBCUTANEOUS AT BEDTIME
Refills: 0 | Status: DISCONTINUED | OUTPATIENT
Start: 2023-12-06 | End: 2023-12-06

## 2023-12-06 RX ORDER — INSULIN LISPRO 100/ML
VIAL (ML) SUBCUTANEOUS AT BEDTIME
Refills: 0 | Status: DISCONTINUED | OUTPATIENT
Start: 2023-12-06 | End: 2023-12-11

## 2023-12-06 RX ORDER — APIXABAN 2.5 MG/1
5 TABLET, FILM COATED ORAL EVERY 12 HOURS
Refills: 0 | Status: DISCONTINUED | OUTPATIENT
Start: 2023-12-06 | End: 2023-12-11

## 2023-12-06 RX ORDER — VANCOMYCIN HCL 1 G
1250 VIAL (EA) INTRAVENOUS EVERY 12 HOURS
Refills: 0 | Status: DISCONTINUED | OUTPATIENT
Start: 2023-12-06 | End: 2023-12-10

## 2023-12-06 RX ADMIN — Medication 166.67 MILLIGRAM(S): at 17:17

## 2023-12-06 RX ADMIN — Medication 10 UNIT(S): at 16:44

## 2023-12-06 RX ADMIN — APIXABAN 5 MILLIGRAM(S): 2.5 TABLET, FILM COATED ORAL at 21:13

## 2023-12-06 RX ADMIN — INSULIN GLARGINE 17 UNIT(S): 100 INJECTION, SOLUTION SUBCUTANEOUS at 23:42

## 2023-12-06 RX ADMIN — ATORVASTATIN CALCIUM 40 MILLIGRAM(S): 80 TABLET, FILM COATED ORAL at 21:13

## 2023-12-06 RX ADMIN — CARVEDILOL PHOSPHATE 3.12 MILLIGRAM(S): 80 CAPSULE, EXTENDED RELEASE ORAL at 23:31

## 2023-12-06 RX ADMIN — CARVEDILOL PHOSPHATE 3.12 MILLIGRAM(S): 80 CAPSULE, EXTENDED RELEASE ORAL at 17:18

## 2023-12-06 RX ADMIN — PIPERACILLIN AND TAZOBACTAM 200 GRAM(S): 4; .5 INJECTION, POWDER, LYOPHILIZED, FOR SOLUTION INTRAVENOUS at 16:36

## 2023-12-06 RX ADMIN — PIPERACILLIN AND TAZOBACTAM 25 GRAM(S): 4; .5 INJECTION, POWDER, LYOPHILIZED, FOR SOLUTION INTRAVENOUS at 21:13

## 2023-12-06 NOTE — ED PROVIDER NOTE - PROGRESS NOTE DETAILS
Spoke with podiatry resident, will see patient, plan for admission to Dr. Malone (vascular). Reports Dr. Myers d/w Dr. Malone, no need to call vascular team. - Cari Farris PA-C Nephro and transplant paged to discuss safe abx options given patient's renal transplant. Nephro fellow called back, asher see patient. - Cari Farris PA-C Spoke with transplant, reports patient to be on their service under Dr. Simons give transplant within 1 year. - Cari Farris PA-C Nephro and transplant paged to discuss admission and safe abx options given patient's renal transplant. Nephro fellow called back, asher see patient. - Cari Farris PA-C Spoke with transplant, reports patient to be on their service under Dr. Simons as transplant within 1 year. - Cari Farris PA-C

## 2023-12-06 NOTE — ED ADULT NURSE NOTE - OBJECTIVE STATEMENT
52 y/o M with  PMHx of Kidney transplant in end of July, DM, CABG, AICD and foot ulcer  arrives to [facility] ED by with c/o. Pt states. A&Ox4. Denies HA, dizziness, blurry vision. Respirations spontaneous and unlabored. Denies SOB, dyspnea, cough, CP, palpitations. EKG done. On CM, NSR. Denies abd pain, N/V/D/C. Abd soft NT/ND. LMP/BM. Denies urinary symptoms. Denies fever, chills. No sick contacts. Skin intact, warm, dry, normal for race. G L/R . Ambulates at baseline.    History and physical as documented above.  Hx of renal transplant for ESRD (per patient, d/t DM) on tacro, cellcept and prednisone presenting with diabetic ulcer to tip of 3rd toe R foot, and larger ulcer over medial aspect L foot. No fever, chills, malaise, nausea, vomiting, weakness, chest pain, dyspnea, abdominal pain. On exam, afebrile, On exam, NAD, non-toxic appearing, VS wnl, awake, alert, oriented x 3, neurologically intact, breathing comfortably, lungs CTAB, no crackles or wheezing, no murmurs, abdomen nondistended, no ttp, no rebound or guarding, no rashes, no peripheral edema, no joint swelling, warm to touch, ulcer to tip of 3rd toe R foot, and larger ulcer over medial aspect L foot w/surrounding cellulitis, no purulence or crepitus. Low concern for nec fasc. Low utility in emergent CT or MRI. May need urgent advanced imaging beyond XR but will check labs, XR of bilateral feet, antibiotics, pain control, podiatry consult, TBA. 52 y/o M with  PMHx of Kidney transplant in end of July, DM, CABG, AICD and foot ulcer  arrives to [facility] ED by with c/o. Pt states. A&Ox4. Denies HA, dizziness, blurry vision. Respirations spontaneous and unlabored. Denies SOB, dyspnea, cough, CP, palpitations. EKG done. On CM, NSR. Denies abd pain, N/V/D/C. Abd soft NT/ND. LMP/BM. Denies urinary symptoms. Denies fever, chills. No sick contacts. Skin intact, warm, dry, normal for race. G L/R . Ambulates at baseline.    History and physical as documented above.  Hx of renal transplant for ESRD (per patient, d/t DM) on tacro, cellcept and prednisone presenting with diabetic ulcer to tip of 3rd toe R foot, and larger ulcer over medial aspect L foot. No fever, chills, malaise, nausea, vomiting, weakness, chest pain, dyspnea, abdominal pain. On exam, afebrile, On exam, NAD, non-toxic appearing, VS wnl, awake, alert, oriented x 3, neurologically intact, breathing comfortably, lungs CTAB, no crackles or wheezing, no murmurs, abdomen nondistended, no ttp, no rebound or guarding, no rashes, no peripheral edema, no joint swelling, warm to touch, ulcer to tip of 3rd toe R foot, and larger ulcer over medial aspect L foot w/surrounding cellulitis, no purulence or crepitus. Low concern for nec fasc. Low utility in emergent CT or MRI. May need urgent advanced imaging beyond XR but will check labs, XR of bilateral feet, antibiotics, pain control, podiatry consult, TBA. 52 y/o M with  PMHx of Kidney transplant in end of July, DM, CABG, AICD and foot ulcer with c/o diabetic ulcer to tip of 3rd toe R foot, and larger ulcer over medial aspect L foot. pt is A&Ox4 able to follow all commands. Pulse, motor, sensation present and equal in all 4 extremities. Denies HA, dizziness, blurry vision. Respirations spontaneous and unlabored on room air. Denies SOB, dyspnea, cough, CP, palpitations. Denies abd pain, N/V/D/C. Abd soft NT/ND. Denies urinary symptoms. Denies fever, chills. No sick contacts. Skin intact, warm, dry, normal for race. pt reports using cane to ambulate after a recent leg injury

## 2023-12-06 NOTE — PATIENT PROFILE ADULT - FALL HARM RISK - HARM RISK INTERVENTIONS
Assistance with ambulation/Assistance OOB with selected safe patient handling equipment/Communicate Risk of Fall with Harm to all staff/Discuss with provider need for PT consult/Monitor gait and stability/Provide patient with walking aids - walker, cane, crutches/Reinforce activity limits and safety measures with patient and family/Tailored Fall Risk Interventions/Visual Cue: Yellow wristband and red socks/Bed in lowest position, wheels locked, appropriate side rails in place/Call bell, personal items and telephone in reach/Instruct patient to call for assistance before getting out of bed or chair/Non-slip footwear when patient is out of bed/[place] to call system/Physically safe environment - no spills, clutter or unnecessary equipment/Purposeful Proactive Rounding/Room/bathroom lighting operational, light cord in reach Assistance with ambulation/Assistance OOB with selected safe patient handling equipment/Communicate Risk of Fall with Harm to all staff/Discuss with provider need for PT consult/Monitor gait and stability/Provide patient with walking aids - walker, cane, crutches/Reinforce activity limits and safety measures with patient and family/Tailored Fall Risk Interventions/Visual Cue: Yellow wristband and red socks/Bed in lowest position, wheels locked, appropriate side rails in place/Call bell, personal items and telephone in reach/Instruct patient to call for assistance before getting out of bed or chair/Non-slip footwear when patient is out of bed/[place] to call system/Physically safe environment - no spills, clutter or unnecessary equipment/Purposeful Proactive Rounding/Room/bathroom lighting operational, light cord in reach

## 2023-12-06 NOTE — CONSULT NOTE ADULT - ASSESSMENT
53M w bilateral foot wounds  - Patient seen and evaluated  - Afebrile, WBC 2.40, ESR/CRP ordered  - Right foot third digit with distal tip fibronecrotic wound to subQ with surrounding hyperkeratosis, no drainage, no erythema, no edema. Left foot dorsal medial wound with fibronecrotic wound bed to subQ, moderate malodor, scant serous drainage, with periwound erythema.  - Left foot wound cultured and ordered  - Recommending IV vanco and cefepime  - Recommending ID consult under Dr Boyd  - Bilateral foot xrays: left foot cortical irregularity along the fifth distal phalanx, and right foot first digit metatarsal head cortical irregulartiy andright foot second proximal phalanx cortical irregularity. all cortical irregularity noted on xrays do no clinically correlate.  - ordered JAN/PVR 2/2 to non palpable pulses: recommending vascular consult under Dr Malone  - Pod plan left foot wound debridement with possible graft application and right foot partial 3rd toe amputation pending JAN/PVR and vasc recs  -Please document medical/cards clearance for podiatry procedure under anesthesia   - seen with attending    53M w bilateral foot wounds  - Patient seen and evaluated  - Afebrile, WBC 2.40, ESR/CRP ordered  - Right foot third digit with distal tip fibronecrotic wound to subQ with surrounding hyperkeratosis, no drainage, no erythema, no edema. Left foot dorsal medial wound with fibronecrotic wound bed to subQ, moderate malodor, scant serous drainage, with periwound erythema.  - Left foot wound cultured and ordered  - Recommending IV vanco and cefepime  - Recommending ID consult under Dr Boyd  - Bilateral foot xrays: left foot cortical irregularity along the fifth distal phalanx, and right foot first digit metatarsal head cortical irregularity and right foot second proximal phalanx cortical irregularity. all cortical irregularity noted on xrays do no clinically correlate.  - ordered JAN/PVR 2/2 to non palpable pulses: recommending vascular consult under Dr Malone  - Pod plan left foot wound debridement with possible graft application and right foot partial 3rd toe amputation pending JAN/PVR and vasc recs  -Please document medical/cards clearance for podiatry procedure under anesthesia   - seen with attending    53M w bilateral foot wounds  - Patient seen and evaluated  - Afebrile, WBC 2.40, ESR/CRP ordered  - Right foot third digit with distal tip fibronecrotic wound to subQ with surrounding hyperkeratosis, no drainage, no erythema, no edema. Left foot dorsal medial wound with fibronecrotic wound bed to subQ, moderate malodor, scant serous drainage, with periwound erythema.  - Left foot wound cultured and ordered  - Recommending IV vanco and cefepime  - Recommending ID consult under Dr Boyd  - Bilateral foot xrays: left foot cortical irregularity along the fifth distal phalanx, and right foot first digit metatarsal head cortical irregularity and right foot second proximal phalanx cortical irregularity. all cortical irregularity noted on xrays do no clinically correlate.  - ordered JAN/PVR 2/2 to non palpable pulses: recommending vascular consult under Dr Malone  - Patient has an AICD that needs to be interrogated   - Pod plan left foot wound debridement with possible graft application and right foot partial 3rd toe amputation pending JAN/PVR and vasc recs  -Please document medical/cards clearance for podiatry procedure under anesthesia   - seen with attending    53M w bilateral foot wounds  - Patient seen and evaluated  - Afebrile, WBC 2.40, ESR/CRP ordered  - Right foot third digit with distal tip fibronecrotic wound to bone with surrounding hyperkeratosis, no drainage, no erythema, no edema. Left foot dorsal medial wound with fibronecrotic wound bed to subQ, moderate malodor, scant serous drainage, with periwound erythema.  - Left foot wound cultured and ordered  - Recommending IV vanco and cefepime  - Recommending ID consult under Dr Boyd  - Bilateral foot xrays: left foot cortical irregularity along the fifth distal phalanx, and right foot first digit metatarsal head cortical irregularity and right foot second proximal phalanx cortical irregularity. all cortical irregularity noted on xrays do no clinically correlate.  - ordered JAN/PVR 2/2 to non palpable pulses: recommending vascular consult under Dr Malone  - Patient has an AICD that needs to be interrogated   - Pod plan left foot wound debridement with possible graft application and right foot partial 3rd toe amputation pending JAN/PVR and vasc recs  -Please document medical/cards clearance for podiatry procedure under anesthesia   - seen with attending

## 2023-12-06 NOTE — CONSULT NOTE ADULT - ATTENDING COMMENTS
VARSHA Malone MD have participated in the daily care of this patient and have performed a history and physical exam of the patient and discussed  the findings and plan with the house officer. I reviewed the resident note and agree with the findings and plan   I Leonardo Malone MD have personally seen and examined the patient at bedside today at  8 pm

## 2023-12-06 NOTE — ED PROVIDER NOTE - PATIENT'S PREFERRED PRONOUN
I have received some of your lab and/or imaging results. Some of the results may appear abnormal but may correlate with your disease activity. If there are specific changes that I am concerned about, I will notify you. Otherwise, follow up as scheduled. Him/He

## 2023-12-06 NOTE — CONSULT NOTE ADULT - SUBJECTIVE AND OBJECTIVE BOX
Tee Salvador is a 52 y/o M with past medical history significant for HTN, CHF (s/p AICD 2016), CAD (s/p CABG 2015), IDDM, PVD s/p stent x4 in RLE with R big toe/second toe amputation (2021) and ESRD on HD via LUE AVF for 6 years now s/p DDRT 7/16/2023 with Thymoglobulin induction. Post-transplant course complicated by severe constipation requiring disimpaction, DGF requiring hemodialysis, admission in August with K pneumoniae UTI treated with cefazolin then keflex. He has chronic bilateral foot wounds followed by wound care. PAtient states in the last week he started noticing some redness around the left leg foot ulcer with no pain or discharge. The podiatrist also noted necrosis of the tip of the third right toe. He was sent to ER for iv antibitoics and management. He has not received any antibiotics. He denies fever but felt chills a week back. On admission, afebrile, WBC 2.4.       ____________________________________________________  ROS  GENERAL: denies chills, , night sweats, weight loss.   PSYCH: denies depression, anxiety, suicidal ideation, hallucination, and delusions  SKIN: no rash or lesions; no color changes, no abnormal nevi,no  dryness, and nojaundice    EYES: denies visual changes, floaters, pain, inflammation, blurred vision, and discharge  ENT: denies tinnitus, vertigo, epistaxis, oral lesion, and decreased acuity  PULM: denies, hemoptysis, pleurisy  CVS: denies angina, palpitations,+ orthopnea, no syncope, or heart murmur  GI: denies constipation, diarrhea, melena, abdominal pain, nausea.   : denies dysuria, frequency, discharge, incontinence, stones or macroscopic hematuria  MS: no arthralgias, no erythema or swelling, no myalgias, noedema, or lower back pain.   CNS: denies numbness, dizziness, seizure, or tremor  ENDO: denies heat/cold intolerance, polyuria, polydipsia, malaise.    HEME: denies bruising, bleeding, lymphadenopathy, anemia, and calf pain    Allergies  Contrast. (Unknown)    __________________________________________________  MEDS:  MEDICATIONS  (STANDING):  apixaban 5 every 12 hours  aspirin enteric coated 81 daily  atorvastatin 40 at bedtime  carvedilol 3.125 two times a day  insulin glargine Injectable (LANTUS) 21 at bedtime  insulin lispro (ADMELOG) corrective regimen sliding scale  three times a day before meals  insulin lispro (ADMELOG) corrective regimen sliding scale  at bedtime  insulin lispro Injectable (ADMELOG) 10 three times a day with meals    _________________________________________________  ANTIMICOBIALS  vancomycin  IVPB 1250 every 12 hours      GENERAL LABS              10.2                 142  | 24   | 29           2.40  >-----------< 168     ------------------------< 87                    32.9                 4.2  | 108  | 1.00                                         Ca 10.3  Mg x     Ph x          Urinalysis Basic - ( 06 Dec 2023 13:21 )    Color: x / Appearance: x / SG: x / pH: x  Gluc: 87 mg/dL / Ketone: x  / Bili: x / Urobili: x   Blood: x / Protein: x / Nitrite: x   Leuk Esterase: x / RBC: x / WBC x   Sq Epi: x / Non Sq Epi: x / Bacteria: x        _________________________________________________  MICROBIOLOGY  -----------      T(C): 36.2 (12-06-23 @ 20:16), Max: 36.7 (12-06-23 @ 17:20)  HR: 74 (12-06-23 @ 20:16) (74 - 80)  BP: 179/84 (12-06-23 @ 20:16) (138/72 - 179/84)  RR: 18 (12-06-23 @ 20:16) (16 - 20)  SpO2: 100% (12-06-23 @ 20:16) (97% - 100%)    CONSTITUTIONAL: Well groomed, no apparent distress  EYES: PERRLA and symmetric, EOMI, No conjunctival or scleral injection, non-icteric  ENMT: Oral mucosa with moist membranes. Normal dentition; no pharyngeal injection or exudates             NECK: Supple, symmetric and without tracheal deviation   RESP: No respiratory distress, no use of accessory muscles; CTA b/l, no WRR  CV: RRR, +S1S2, no MRG; no JVD; no peripheral edema  GI: Soft, NT, ND, no rebound, no guarding; no palpable masses; no hepatosplenomegaly; no hernia palpated  LYMPH: No cervical LAD or tenderness; no axillary LAD or tenderness; no inguinal LAD or tenderness  MSK: Normal gait; No digital clubbing or cyanosis; examination of the (head/neck/spine/ribs/pelvis, RUE, LUE, RLE, LLE) without misalignment,            Normal ROM without pain, no spinal tenderness, normal muscle strength/tone  SKIN: left foot dorsal medial ulcer with surrounding erythema, no discharge  R foot metatarsal stump c/d/i , 3d toe tip with dry ulcer, necrotic. no erythema surrounding this              Fungitell:   _______________________________________________  PERTINENT IMAGING

## 2023-12-06 NOTE — ED PROVIDER NOTE - NS ED MD DISPO DIVISION
[facility]

## 2023-12-06 NOTE — CONSULT NOTE ADULT - ASSESSMENT
ASSESSMENT: 54 y/o M with PMH of HTN, CHF (s/p AICD 2016), CAD (s/p CABG 2015), IDDM, PVD s/p stent x4 in RLE with R big toe/second toe amputation (2021) and ESRD on HD via LUE AVF for 6 years now s/p DDRT 7/16/2023 presenting to the ED with infected R 3rd toe and left 1st metatarsal ulcers. Patient follows with Dr. Malone of Vascular Surgery. Patient states he has had these wounds for a while, follows with Podiatry. However, states that over the past few weeks, they have begun to look worse, specifically the left 1st metatarsal ulcer. Vascular surgery consulted for evaluation:    RECS:  -No acute intervention  -Recommend JAN/ PVRs   -Will likely need angiogram during this admission  -Podiatry following, appreciate recs   -Please obtain medical and cardiac clearance   -Discussed with fellow, Dr. Farnsworth     Vascular Surgery, [id number]    ASSESSMENT: 52 y/o M with PMH of HTN, CHF (s/p AICD 2016), CAD (s/p CABG 2015), IDDM, PVD s/p stent x4 in RLE with R big toe/second toe amputation (2021) and ESRD on HD via LUE AVF for 6 years now s/p DDRT 7/16/2023 presenting to the ED with infected R 3rd toe and left 1st metatarsal ulcers. Patient follows with Dr. Malone of Vascular Surgery. Patient states he has had these wounds for a while, follows with Podiatry. However, states that over the past few weeks, they have begun to look worse, specifically the left 1st metatarsal ulcer. Vascular surgery consulted for evaluation:    RECS:  -No acute intervention  -Recommend JAN/ PVRs   -Will likely need angiogram during this admission  -Podiatry following, appreciate recs   -Please obtain medical and cardiac clearance   -Discussed with fellow, Dr. Farnsworth     Vascular Surgery, [id number]    ASSESSMENT: 52 y/o M with PMH of HTN, CHF (s/p AICD 2016), CAD (s/p CABG 2015), IDDM, PVD s/p stent x4 in RLE with R big toe/second toe amputation (2021) and ESRD on HD via LUE AVF for 6 years now s/p DDRT 7/16/2023 presenting to the ED with infected R 3rd toe and left 1st metatarsal ulcers. Patient follows with Dr. Malone of Vascular Surgery. Patient states he has had these wounds for a while, follows with Podiatry. However, states that over the past few weeks, they have begun to look worse, specifically the left 1st metatarsal ulcer. Vascular surgery consulted for evaluation:    RECS:  -No acute intervention  -Recommend JAN/ PVRs   -Podiatry following, appreciate recs   -Please obtain medical and cardiac clearance   -Discussed with fellow, Dr. Farnsworth   pt  is s/p recent  renal transplant  placement in left iliac fossa  d/w pt indications risks and benefits of  le angio possintervention to the rle and  left renal transplant  d/w renal attending  pt may be cleared for mra instead of   angio w iv contrast   will follow         Vascular Surgery, [id number]    ASSESSMENT: 54 y/o M with PMH of HTN, CHF (s/p AICD 2016), CAD (s/p CABG 2015), IDDM, PVD s/p stent x4 in RLE with R big toe/second toe amputation (2021) and ESRD on HD via LUE AVF for 6 years now s/p DDRT 7/16/2023 presenting to the ED with infected R 3rd toe and left 1st metatarsal ulcers. Patient follows with Dr. Malone of Vascular Surgery. Patient states he has had these wounds for a while, follows with Podiatry. However, states that over the past few weeks, they have begun to look worse, specifically the left 1st metatarsal ulcer. Vascular surgery consulted for evaluation:    RECS:  -No acute intervention  -Recommend JAN/ PVRs   -Podiatry following, appreciate recs   -Please obtain medical and cardiac clearance   -Discussed with fellow, Dr. Farnsworth   pt  is s/p recent  renal transplant  placement in left iliac fossa  d/w pt indications risks and benefits of  le angio possintervention to the rle and  left renal transplant  d/w renal attending  pt may be cleared for mra instead of   angio w iv contrast   will follow         Vascular Surgery, [id number]

## 2023-12-06 NOTE — CONSULT NOTE ADULT - ASSESSMENT
53M with PMH of HTN, HLD, DM2, CAD s/p CABG, HF s/p AICD, ESRD on HD since 2016 s/p DDRT (7/14/23) complicated by DGF, and toe amputations due to PVD/osteo, now presenting to the ED with concern for toe infection. Nephrology service consulted for management of immunosuppression of transplanted kidney.       1. 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   - Scr WNL at 1    2. Immunosuppression   - Initially received Simulect induction but changed to Thymoglobulin given delayed graft function.  - On MMF, Envarsus, and Cellcept  - Check serum tacrolimus level (trough) 30 mins prior to AM dose.  - Ppx with Valcyte and Bactrim  - Recommend holding cellcept at this time due to possible osteo    3. ?Osteomyelitis - Pt being seen by Podiatry, Vascular, and Transplant ID. Possible LE angio.  4. HTN - BPs elevated  5. DM2- On lantus and premeal insulin      If you have any questions, please feel free to contact me.  Stuart Franklin MD  Nephrology Fellow  [id number] / [employer] Teams (Preferred)  (After 4pm or on weekends, please call the on-call Fellow)   53M with PMH of HTN, HLD, DM2, CAD s/p CABG, HF s/p AICD, ESRD on HD since 2016 s/p DDRT (7/14/23) complicated by DGF, and toe amputations due to PVD/osteo, now presenting to the ED with concern for toe infection. Nephrology service consulted for management of immunosuppression of transplanted kidney.       1. 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   - Scr WNL at 1    2. Immunosuppression   - Initially received Simulect induction but changed to Thymoglobulin given delayed graft function.  - On MMF, Envarsus, and Cellcept  - Check serum tacrolimus level (trough) 30 mins prior to AM dose.  - Ppx with Valcyte and Bactrim  - Recommend holding cellcept at this time due to possible osteo    3. ?Osteomyelitis - Pt being seen by Podiatry, Vascular, and Transplant ID. Possible LE angio.  4. HTN - BPs elevated  5. DM2- On lantus and premeal insulin      If you have any questions, please feel free to contact me.  Stuart Franklin MD  Nephrology Fellow  [id number] / [employer] Teams (Preferred)  (After 4pm or on weekends, please call the on-call Fellow)

## 2023-12-06 NOTE — H&P ADULT - HISTORY OF PRESENT ILLNESS
Tee Salvador is a 54 y/o M with past medical history significant for IDDM, CAD s/p CABG x4v (2015) s/p AICD (2016), PVD (4 stents in RLE), HTN, HLD and ESRD previously on HD via LUE AVF (ligated 9/8/23) now s/p DDRT 7/16/2023 with Simulect -> Thymoglobulin induction. His post-transplant course was complicated by DGF and subtly AMR with +DSAs s/p PLEX/IVIG (8/2023) and L subclavian thrombus remains on Eliquis presents to [facility] ED from wound care clinic on 12/6/23 after concern for osteomyelitis from clinic providers.     Per patient, he has been following with wound clinic. Reports he had a blister on his L anterior foot that had popped and scabbed over. Also reports hearing a "pop" with an inability to walk on his L leg, and attributes this with why he is now ambulating with a cane. He also reports "the doc said the Right foot is worse" reporting during exam today they were able to "touch the bone". He denies foul smelling foot, odors, discharge from wounds. Denies fevers, chills. Reports no changes in blood sugars at home, other than "the up and down" with controlled glucose in AM then elevated to 200s sometimes during the day. Denies dysuria, abdominal pain.  Tee Salvador is a 54 y/o M with past medical history significant for IDDM, CAD s/p CABG x4v (2015) s/p AICD (2016), PVD (4 stents in RLE), HTN, HLD and ESRD previously on HD via LUE AVF (ligated 9/8/23) now s/p DDRT 7/16/2023 with Simulect -> Thymoglobulin induction. His post-transplant course was complicated by DGF and subtly AMR with +DSAs s/p PLEX/IVIG (8/2023) and L subclavian thrombus remains on Eliquis presents to [facility] ED from wound care clinic on 12/6/23 after concern for osteomyelitis from clinic providers.     Per patient, he has been following with wound clinic. Reports he had a blister on his L anterior foot that had popped and scabbed over. Also reports hearing a "pop" with an inability to walk on his L leg, and attributes this with why he is now ambulating with a cane. He also reports "the doc said the Right foot is worse" reporting during exam today they were able to "touch the bone". He denies foul smelling foot, odors, discharge from wounds. Denies fevers, chills. Reports no changes in blood sugars at home, other than "the up and down" with controlled glucose in AM then elevated to 200s sometimes during the day. Denies dysuria, abdominal pain.

## 2023-12-06 NOTE — H&P ADULT - ASSESSMENT
52 y/o M with past medical history significant for IDDM, CAD s/p CABG x4v (2015) s/p AICD (2016), PVD (4 stents in RLE), HTN, HLD and ESRD previously on HD via LUE AVF (ligated 9/8/23) now s/p DDRT 7/16/2023 with Simulect -> Thymoglobulin induction. His post-transplant course was complicated by DGF and subtly AMR with +DSAs s/p PLEX/IVIG (8/2023) and L subclavian thrombus remains on Eliquis presents to [facility] ED from wound care clinic on 12/6/23 after concern for osteomyelitis from clinic providers.     [ ] c/f bilateral foot osteomyelitis  - Vascular and podiatry consulted  - XR concerning for osteomyelitis of both feet  - Transplant ID consulted  - MRI R/L foot ordered   - Start Zosyn/Vanco for empiric coverage   - Trend fever curve  - Follow up blood cultures sent in ED   - Hx PVD: May need perfusion studies, though has palpable pulses     [ ] s/p DDRT 7/16/2023   - Baseline Cr ~1.0   - Immuno: Env 5, MMF HELD d/t c/f for osteomyelitis , Pred 10   - PPx: Bactrim, Pepcid   - Leukopenia: Daily CBC w/ diff, holding Valcyte, CMV w/ AM labs     [ ] IDDM  - Continue Lantus/Lispro home dose   - A1C w/ AM labs     [ ] CHF, CAD s/p CABG, L SC DVT   - Continue Eliquis, ASA 81   - Continue Coreg, Lipitor  54 y/o M with past medical history significant for IDDM, CAD s/p CABG x4v (2015) s/p AICD (2016), PVD (4 stents in RLE), HTN, HLD and ESRD previously on HD via LUE AVF (ligated 9/8/23) now s/p DDRT 7/16/2023 with Simulect -> Thymoglobulin induction. His post-transplant course was complicated by DGF and subtly AMR with +DSAs s/p PLEX/IVIG (8/2023) and L subclavian thrombus remains on Eliquis presents to [facility] ED from wound care clinic on 12/6/23 after concern for osteomyelitis from clinic providers.     [ ] c/f bilateral foot osteomyelitis  - Vascular and podiatry consulted  - XR concerning for osteomyelitis of both feet  - Transplant ID consulted  - MRI R/L foot ordered   - Start Zosyn/Vanco for empiric coverage   - Trend fever curve  - Follow up blood cultures sent in ED   - Hx PVD: May need perfusion studies, though has palpable pulses     [ ] s/p DDRT 7/16/2023   - Baseline Cr ~1.0   - Immuno: Env 5, MMF HELD d/t c/f for osteomyelitis , Pred 10   - PPx: Bactrim, Pepcid   - Leukopenia: Daily CBC w/ diff, holding Valcyte, CMV w/ AM labs     [ ] IDDM  - Continue Lantus/Lispro home dose   - A1C w/ AM labs     [ ] CHF, CAD s/p CABG, L SC DVT   - Continue Eliquis, ASA 81   - Continue Coreg, Lipitor

## 2023-12-06 NOTE — ED PROVIDER NOTE - ATTENDING APP SHARED VISIT CONTRIBUTION OF CARE
History and physical as documented above.  Hx of renal transplant for ESRD (per patient, d/t DM) on tacro, cellcept and prednisone presenting with diabetic ulcer to tip of 3rd toe R foot, and larger ulcer over medial aspect L foot. No fever, chills, malaise, nausea, vomiting, weakness, chest pain, dyspnea, abdominal pain. On exam, afebrile, On exam, NAD, non-toxic appearing, VS wnl, awake, alert, oriented x 3, neurologically intact, breathing comfortably, lungs CTAB, no crackles or wheezing, no murmurs, abdomen nondistended, no ttp, no rebound or guarding, no rashes, no peripheral edema, no joint swelling, warm to touch, ulcer to tip of 3rd toe R foot, and larger ulcer over medial aspect L foot w/surrounding cellulitis, no purulence or crepitus. Low concern for nec fasc. Low utility in emergent CT or MRI. May need urgent advanced imaging beyond XR but will check labs, XR of bilateral feet, antibiotics, pain control, podiatry consult, TBA.

## 2023-12-06 NOTE — CONSULT NOTE ADULT - ASSESSMENT
Left foot:    Status post amputation of the fifth metatarsal and proximal aspect of the   fifth proximal phalanx. Cortical irregularity along the fifth distal   phalanx, which may represent osteomyelitis. Correlate clinically for   overlying ulcer. MRI can be completed for further evaluation as   clinically indicated.    No acute displaced fracture or dislocation.  Tee Salvador is a 52 y/o M with past medical history significant for HTN, CHF (s/p AICD 2016), CAD (s/p CABG 2015), IDDM, PVD s/p stent x4 in RLE with R big toe/second toe amputation (2021) and ESRD on HD via LUE AVF for 6 years now s/p DDRT 7/16/2023 with Thymoglobulin induction. Post-transplant course complicated by severe constipation requiring disimpaction, DGF requiring hemodialysis, admission in August with K pneumoniae UTI treated with cefazolin then keflex. He has chronic bilateral foot wounds followed by wound care. PAtient states in the last week he started noticing some redness around the left leg foot ulcer with no pain or discharge. The podiatrist also noted necrosis of the tip of the third right toe. He was sent to ER for iv antibitoics and management. He has not received any antibiotics. He denies fever but felt chills a week back. On admission, afebrile, WBC 2.4.       Xray   Right foot:  Evaluation of the toes is limited due to patient positioning.  Status post amputation of the first ray to the level of the first   metatarsal head. There is minimal cortical irregularity with mild   progressive lucency within the first metatarsal head, which may represent   underlying osteomyelitis. Correlate clinically for overlying ulcer.  Status post amputation of the second toe to the level of the second   proximal phalanx with mild cortical irregularity along the amputation   margin of the second proximal phalanx, which may represent osteomyelitis.   Correlate clinically for overlying ulcer.      1. S/p DDKT 7/16/23 CMV D+/+, EBV D+/R+  Induction ATG  maintenance. tacro, mmf, pred  PPx; valcyte and bactrim  Complications DGF requiring HD    2. left foot ulcer and cellulitis  3. Right 3d toe necrotic ulcer  4. History of first ray amputation 8/2021 - tissue cx grew E faecalis, Corynebaacterium, prevotella spp, Lecrercia spp   CXR with concern for OM although not in areas of concern on exam    Recommend  ·	BC x2,   ·	MRi of both feet  ·	ESR, CRP  ·	continue zosyn 3.375 q8h , vancomycin 1 g q12h with goal of trough 10-15 - check trough priro to 4th dose  given cellulitis surrounding the left ulcer  ·	consult podiatry  ·	please also check CMV PCR  ·	continue valcyte and bactrim ppx    Thank you for involving us in the care of this patient  Transplant ID will continue to follow  Please call or page with additional questions  Pager; #[phone]  Teams: from 8 am to 5 pm  Elisabet Nguyen MD         Left foot:    Status post amputation of the fifth metatarsal and proximal aspect of the   fifth proximal phalanx. Cortical irregularity along the fifth distal   phalanx, which may represent osteomyelitis. Correlate clinically for   overlying ulcer. MRI can be completed for further evaluation as   clinically indicated.    No acute displaced fracture or dislocation.  Tee Salvador is a 54 y/o M with past medical history significant for HTN, CHF (s/p AICD 2016), CAD (s/p CABG 2015), IDDM, PVD s/p stent x4 in RLE with R big toe/second toe amputation (2021) and ESRD on HD via LUE AVF for 6 years now s/p DDRT 7/16/2023 with Thymoglobulin induction. Post-transplant course complicated by severe constipation requiring disimpaction, DGF requiring hemodialysis, admission in August with K pneumoniae UTI treated with cefazolin then keflex. He has chronic bilateral foot wounds followed by wound care. PAtient states in the last week he started noticing some redness around the left leg foot ulcer with no pain or discharge. The podiatrist also noted necrosis of the tip of the third right toe. He was sent to ER for iv antibitoics and management. He has not received any antibiotics. He denies fever but felt chills a week back. On admission, afebrile, WBC 2.4.       Xray   Right foot:  Evaluation of the toes is limited due to patient positioning.  Status post amputation of the first ray to the level of the first   metatarsal head. There is minimal cortical irregularity with mild   progressive lucency within the first metatarsal head, which may represent   underlying osteomyelitis. Correlate clinically for overlying ulcer.  Status post amputation of the second toe to the level of the second   proximal phalanx with mild cortical irregularity along the amputation   margin of the second proximal phalanx, which may represent osteomyelitis.   Correlate clinically for overlying ulcer.      1. S/p DDKT 7/16/23 CMV D+/+, EBV D+/R+  Induction ATG  maintenance. tacro, mmf, pred  PPx; valcyte and bactrim  Complications DGF requiring HD    2. left foot ulcer and cellulitis  3. Right 3d toe necrotic ulcer  4. History of first ray amputation 8/2021 - tissue cx grew E faecalis, Corynebaacterium, prevotella spp, Lecrercia spp   CXR with concern for OM although not in areas of concern on exam    Recommend  ·	BC x2,   ·	MRi of both feet  ·	ESR, CRP  ·	continue zosyn 3.375 q8h , vancomycin 1 g q12h with goal of trough 10-15 - check trough priro to 4th dose  given cellulitis surrounding the left ulcer  ·	consult podiatry  ·	please also check CMV PCR  ·	continue valcyte and bactrim ppx    Thank you for involving us in the care of this patient  Transplant ID will continue to follow  Please call or page with additional questions  Pager; #[phone]  Teams: from 8 am to 5 pm  Elisabet Nguyen MD

## 2023-12-06 NOTE — PATIENT PROFILE ADULT - CAREGIVER ADDRESS
[id number] [place] [address], [id number] [address] [place] [address], [phone]

## 2023-12-06 NOTE — H&P ADULT - NSHPPHYSICALEXAM_GEN_ALL_CORE
General: Well appearing, resting comfortably in bed in no acute distress   Neuro: Grossly intact bilaterally   HEENT: Normocephalic, atraumatic, trachea midline, no JVD   Chest: well healed scars on anterior chest   Heart: Regular S1/S2, no murmurs rubs or gallops   Lungs: Unlabored breathing on RA; Clear to auscultation bilaterally, no adventitious sounds   Abdomen: Soft, non-distended, normoactive bowel sounds throughout, no tenderness to palpation in all 4 quadrants; LLQ incision well healed    Upper Extremities: No edema, freely mobile bilaterally   Lower Extremities: L>R calf warmth, cap refill <2 seconds, 2+ AT pulses, feet warm bilaterally   Skin: Warm, non-diaphoretic throughout

## 2023-12-06 NOTE — CONSULT NOTE ADULT - SUBJECTIVE AND OBJECTIVE BOX
[facility] DIVISION OF KIDNEY DISEASES AND HYPERTENSION -- INITIAL CONSULT NOTE  --------------------------------------------------------------------------------  HPI: 53M with PMH of HTN, HLD, DM2, CAD s/p CABG, HF s/p AICD, ESRD on HD since 2016 s/p kidney transplant, and toe amputations due to PVD/osteo, now presenting to the ED with concern for toe infection. Nephrology service consulted for management of immunosuppression of transplanted kidney.         PAST HISTORY  --------------------------------------------------------------------------------  PAST MEDICAL & SURGICAL HISTORY:  Diabetes mellitus  type 2  Foot ulcer due to secondary DM  Coronary artery disease  Acute on chronic systolic heart failure  H/O kidney transplant  Breast Reduction  at age 18  Toe amputation status, left  S/P CABG (coronary artery bypass graft)  AICD (automatic cardioverter/defibrillator) present    FAMILY HISTORY:  Family history of diabetes mellitus (Father)    Social History:  Previous smoking history    ALLERGIES & MEDICATIONS  --------------------------------------------------------------------------------  Allergies  Contrast. (Unknown)    Intolerances    Standing Inpatient Medications  apixaban 5 milliGRAM(s) Oral every 12 hours  aspirin enteric coated 81 milliGRAM(s) Oral daily  atorvastatin 40 milliGRAM(s) Oral at bedtime  carvedilol 3.125 milliGRAM(s) Oral two times a day  insulin glargine Injectable (LANTUS) 21 Unit(s) SubCutaneous at bedtime  insulin lispro (ADMELOG) corrective regimen sliding scale   SubCutaneous at bedtime  insulin lispro (ADMELOG) corrective regimen sliding scale   SubCutaneous three times a day before meals  insulin lispro Injectable (ADMELOG) 10 Unit(s) SubCutaneous three times a day with meals  vancomycin  IVPB 1250 milliGRAM(s) IV Intermittent every 12 hours    PRN Inpatient Medications    REVIEW OF SYSTEMS  --------------------------------------------------------------------------------  Gen: No fevers/chills  Skin: No rashes  Head/Eyes/Ears/Mouth: No headache  Respiratory: No dyspnea, cough  CV: No chest pain  GI: No abdominal pain, diarrhea, constipation, nausea, vomiting  : No increased frequency, dysuria, hematuria  MSK: No edema  Neuro: No dizziness/lightheadedness, weakness  Heme: No easy bruising or bleeding    VITALS/PHYSICAL EXAM  --------------------------------------------------------------------------------  T(C): 36.2 (12-06-23 @ 20:16), Max: 36.7 (12-06-23 @ 17:20)  HR: 74 (12-06-23 @ 20:16) (74 - 80)  BP: 179/84 (12-06-23 @ 20:16) (138/72 - 179/84)  RR: 18 (12-06-23 @ 20:16) (16 - 20)  SpO2: 100% (12-06-23 @ 20:16) (97% - 100%)  Wt(kg): --  Height (cm): 175.3 (12-06-23 @ 10:41)  Weight (kg): 89.4 (12-06-23 @ 10:41)  BMI (kg/m2): 29.1 (12-06-23 @ 10:41)  BSA (m2): 2.05 (12-06-23 @ 10:41)    Physical Exam:  Gen: Not in distress, well-appearing  HEENT: pupils reactive to light, no scelral icterus, moist oral mucosa. No thrush. Supple neck, no JVD  Pulm: normal respiratory effort, lungs clear to auscultation bilaterally   CV: regular rate and rhythm, S1 and S2 normal, no murmur   Abd: normoactive bowel sounds, soft and non distended abdomen. No tenderness, guarding or rigidity. Transplant non tender, no bruit  : No suprapubic tenderness  Back: No spinal or CVA tenderness; no pre sacral edema  Extremities: No edema. Distal pulses 2+ bilaterally   Skin: wram, no rash, no cyanosis   Neuro: Alert and oriented to person, place and time. Normal speech. Normal affect.     LABS/STUDIES  --------------------------------------------------------------------------------              10.2   2.40  >-----------<  168      [12-06-23 @ 13:21]              32.9     142  |  108  |  29  ----------------------------<  87      [12-06-23 @ 13:21]  4.2   |  24  |  1.00        Ca     10.3     [12-06-23 @ 13:21]    TPro  6.3  /  Alb  3.7  /  TBili  0.3  /  DBili  x   /  AST  11  /  ALT  10  /  AlkPhos  97  [12-06-23 @ 13:21]          Creatinine Trend:  SCr 1.00 [12-06 @ 13:21]    Urinalysis - [12-06-23 @ 13:21]      Color  / Appearance  / SG  / pH       Gluc 87 / Ketone   / Bili  / Urobili        Blood  / Protein  / Leuk Est  / Nitrite       RBC  / WBC  / Hyaline  / Gran  / Sq Epi  / Non Sq Epi  / Bacteria       Iron 68, TIBC 185, %sat 37      [08-16-23 @ 06:39]  Ferritin 1010      [08-16-23 @ 06:39]  PTH -- (Ca 10.6)      [11-02-23 @ 11:56]   220  Vitamin D (25OH) 18.0      [11-02-23 @ 11:56]    HBsAb 5.7      [07-15-23 @ 21:22]  HBsAb Reactive      [08-17-23 @ 11:28]  HBsAg Nonreact      [08-17-23 @ 11:28]  HBcAb Nonreact      [07-15-23 @ 21:22]  HCV 0.16, Nonreact      [07-15-23 @ 21:22]  HIV Nonreact      [07-15-23 @ 21:21]      Tacrolimus  Cyclosporine  Sirolimus  Mycophenolate  BK PCR  CMV PCR  Parvo PCR  EBV PCR [facility] DIVISION OF KIDNEY DISEASES AND HYPERTENSION -- INITIAL CONSULT NOTE  --------------------------------------------------------------------------------  HPI: 53M with PMH of HTN, HLD, DM2, CAD s/p CABG, HF s/p AICD, ESRD on HD since 2016 s/p kidney transplant, and toe amputations due to PVD/osteo, now presenting to the ED with concern for toe infection. Nephrology service consulted for management of immunosuppression of transplanted kidney.         PAST HISTORY  --------------------------------------------------------------------------------  PAST MEDICAL & SURGICAL HISTORY:  Diabetes mellitus  type 2  Foot ulcer due to secondary DM  Coronary artery disease  Acute on chronic systolic heart failure  H/O kidney transplant  Breast Reduction  at age 18  Toe amputation status, left  S/P CABG (coronary artery bypass graft)  AICD (automatic cardioverter/defibrillator) present    FAMILY HISTORY:  Family history of diabetes mellitus (Father)    Social History:  Previous smoking history    ALLERGIES & MEDICATIONS  --------------------------------------------------------------------------------  Allergies  Contrast. (Unknown)    Intolerances    Standing Inpatient Medications  apixaban 5 milliGRAM(s) Oral every 12 hours  aspirin enteric coated 81 milliGRAM(s) Oral daily  atorvastatin 40 milliGRAM(s) Oral at bedtime  carvedilol 3.125 milliGRAM(s) Oral two times a day  insulin glargine Injectable (LANTUS) 21 Unit(s) SubCutaneous at bedtime  insulin lispro (ADMELOG) corrective regimen sliding scale   SubCutaneous at bedtime  insulin lispro (ADMELOG) corrective regimen sliding scale   SubCutaneous three times a day before meals  insulin lispro Injectable (ADMELOG) 10 Unit(s) SubCutaneous three times a day with meals  vancomycin  IVPB 1250 milliGRAM(s) IV Intermittent every 12 hours    PRN Inpatient Medications    REVIEW OF SYSTEMS  --------------------------------------------------------------------------------  Gen: No fevers/chills  Skin: No rashes  Head/Eyes/Ears/Mouth: No headache  Respiratory: No dyspnea, cough  CV: No chest pain  GI: No abdominal pain, diarrhea, constipation, nausea, vomiting  : No increased frequency, dysuria, hematuria  MSK: No edema  Neuro: No dizziness/lightheadedness, weakness  Heme: No easy bruising or bleeding    VITALS/PHYSICAL EXAM  --------------------------------------------------------------------------------  T(C): 36.2 (12-06-23 @ 20:16), Max: 36.7 (12-06-23 @ 17:20)  HR: 74 (12-06-23 @ 20:16) (74 - 80)  BP: 179/84 (12-06-23 @ 20:16) (138/72 - 179/84)  RR: 18 (12-06-23 @ 20:16) (16 - 20)  SpO2: 100% (12-06-23 @ 20:16) (97% - 100%)  Wt(kg): --  Height (cm): 175.3 (12-06-23 @ 10:41)  Weight (kg): 89.4 (12-06-23 @ 10:41)  BMI (kg/m2): 29.1 (12-06-23 @ 10:41)  BSA (m2): 2.05 (12-06-23 @ 10:41)    Physical Exam:  Gen: Not in distress, well-appearing  HEENT: pupils reactive to light, no scelral icterus, moist oral mucosa. No thrush. Supple neck, no JVD  Pulm: normal respiratory effort, lungs clear to auscultation bilaterally   CV: regular rate and rhythm, S1 and S2 normal, no murmur   Abd: normoactive bowel sounds, soft and non distended abdomen. No tenderness, guarding or rigidity. Transplant non tender, no bruit  : No suprapubic tenderness  Back: No spinal or CVA tenderness; no pre sacral edema  Extremities: No edema. Distal pulses 2+ bilaterally   Skin: wram, no rash, no cyanosis   Neuro: Alert and oriented to person, place and time. Normal speech. Normal affect.     LABS/STUDIES  --------------------------------------------------------------------------------              10.2   2.40  >-----------<  168      [12-06-23 @ 13:21]              32.9     142  |  108  |  29  ----------------------------<  87      [12-06-23 @ 13:21]  4.2   |  24  |  1.00        Ca     10.3     [12-06-23 @ 13:21]    TPro  6.3  /  Alb  3.7  /  TBili  0.3  /  DBili  x   /  AST  11  /  ALT  10  /  AlkPhos  97  [12-06-23 @ 13:21]          Creatinine Trend:  SCr 1.00 [12-06 @ 13:21]    Urinalysis - [12-06-23 @ 13:21]      Color  / Appearance  / SG  / pH       Gluc 87 / Ketone   / Bili  / Urobili        Blood  / Protein  / Leuk Est  / Nitrite       RBC  / WBC  / Hyaline  / Gran  / Sq Epi  / Non Sq Epi  / Bacteria       Iron 68, TIBC 185, %sat 37      [08-16-23 @ 06:39]  Ferritin 1010      [08-16-23 @ 06:39]  PTH -- (Ca 10.6)      [11-02-23 @ 11:56]   220  Vitamin D (25OH) 18.0      [11-02-23 @ 11:56]    HBsAb 5.7      [07-15-23 @ 21:22]  HBsAb Reactive      [08-17-23 @ 11:28]  HBsAg Nonreact      [08-17-23 @ 11:28]  HBcAb Nonreact      [07-15-23 @ 21:22]  HCV 0.16, Nonreact      [07-15-23 @ 21:22]  HIV Nonreact      [07-15-23 @ 21:21]      Tacrolimus  Cyclosporine  Sirolimus  Mycophenolate  BK PCR  CMV PCR  Parvo PCR  EBV PCR [facility] DIVISION OF KIDNEY DISEASES AND HYPERTENSION -- INITIAL CONSULT NOTE  --------------------------------------------------------------------------------  HPI: 53M with PMH of HTN, HLD, DM2, CAD s/p CABG, HF s/p AICD, ESRD on HD since 2016 s/p DDRT (7/14/23) complicated by DGF, and toe amputations due to PVD/osteo, now presenting to the ED with concern for toe infection. Nephrology service consulted for management of immunosuppression of transplanted kidney.         PAST HISTORY  --------------------------------------------------------------------------------  PAST MEDICAL & SURGICAL HISTORY:  Diabetes mellitus  type 2  Foot ulcer due to secondary DM  Coronary artery disease  Acute on chronic systolic heart failure  H/O kidney transplant  Breast Reduction  at age 18  Toe amputation status, left  S/P CABG (coronary artery bypass graft)  AICD (automatic cardioverter/defibrillator) present    FAMILY HISTORY:  Family history of diabetes mellitus (Father)    Social History:  Previous smoking history    ALLERGIES & MEDICATIONS  --------------------------------------------------------------------------------  Allergies  Contrast. (Unknown)    Intolerances    Standing Inpatient Medications  apixaban 5 milliGRAM(s) Oral every 12 hours  aspirin enteric coated 81 milliGRAM(s) Oral daily  atorvastatin 40 milliGRAM(s) Oral at bedtime  carvedilol 3.125 milliGRAM(s) Oral two times a day  insulin glargine Injectable (LANTUS) 21 Unit(s) SubCutaneous at bedtime  insulin lispro (ADMELOG) corrective regimen sliding scale   SubCutaneous at bedtime  insulin lispro (ADMELOG) corrective regimen sliding scale   SubCutaneous three times a day before meals  insulin lispro Injectable (ADMELOG) 10 Unit(s) SubCutaneous three times a day with meals  vancomycin  IVPB 1250 milliGRAM(s) IV Intermittent every 12 hours    PRN Inpatient Medications    REVIEW OF SYSTEMS  --------------------------------------------------------------------------------  Gen: No fevers/chills  Skin: No rashes  Head/Eyes/Ears/Mouth: No headache  Respiratory: No dyspnea, cough  CV: No chest pain  GI: No abdominal pain, diarrhea, constipation, nausea, vomiting  : No increased frequency, dysuria, hematuria  MSK: No edema  Neuro: No dizziness/lightheadedness, weakness  Heme: No easy bruising or bleeding    VITALS/PHYSICAL EXAM  --------------------------------------------------------------------------------  T(C): 36.2 (12-06-23 @ 20:16), Max: 36.7 (12-06-23 @ 17:20)  HR: 74 (12-06-23 @ 20:16) (74 - 80)  BP: 179/84 (12-06-23 @ 20:16) (138/72 - 179/84)  RR: 18 (12-06-23 @ 20:16) (16 - 20)  SpO2: 100% (12-06-23 @ 20:16) (97% - 100%)  Wt(kg): --  Height (cm): 175.3 (12-06-23 @ 10:41)  Weight (kg): 89.4 (12-06-23 @ 10:41)  BMI (kg/m2): 29.1 (12-06-23 @ 10:41)  BSA (m2): 2.05 (12-06-23 @ 10:41)    Physical Exam:  Gen: Not in distress, well-appearing  HEENT: pupils reactive to light, no scelral icterus, moist oral mucosa. No thrush. Supple neck, no JVD  Pulm: normal respiratory effort, lungs clear to auscultation bilaterally   CV: regular rate and rhythm, S1 and S2 normal, no murmur   Abd: normoactive bowel sounds, soft and non distended abdomen. No tenderness, guarding or rigidity. Transplant non tender, no bruit  : No suprapubic tenderness  Back: No spinal or CVA tenderness; no pre sacral edema  Extremities: No edema. Distal pulses 2+ bilaterally   Skin: wram, no rash, no cyanosis   Neuro: Alert and oriented to person, place and time. Normal speech. Normal affect.     LABS/STUDIES  --------------------------------------------------------------------------------              10.2   2.40  >-----------<  168      [12-06-23 @ 13:21]              32.9     142  |  108  |  29  ----------------------------<  87      [12-06-23 @ 13:21]  4.2   |  24  |  1.00        Ca     10.3     [12-06-23 @ 13:21]    TPro  6.3  /  Alb  3.7  /  TBili  0.3  /  DBili  x   /  AST  11  /  ALT  10  /  AlkPhos  97  [12-06-23 @ 13:21]          Creatinine Trend:  SCr 1.00 [12-06 @ 13:21]    Urinalysis - [12-06-23 @ 13:21]      Color  / Appearance  / SG  / pH       Gluc 87 / Ketone   / Bili  / Urobili        Blood  / Protein  / Leuk Est  / Nitrite       RBC  / WBC  / Hyaline  / Gran  / Sq Epi  / Non Sq Epi  / Bacteria       Iron 68, TIBC 185, %sat 37      [08-16-23 @ 06:39]  Ferritin 1010      [08-16-23 @ 06:39]  PTH -- (Ca 10.6)      [11-02-23 @ 11:56]   220  Vitamin D (25OH) 18.0      [11-02-23 @ 11:56]    HBsAb 5.7      [07-15-23 @ 21:22]  HBsAb Reactive      [08-17-23 @ 11:28]  HBsAg Nonreact      [08-17-23 @ 11:28]  HBcAb Nonreact      [07-15-23 @ 21:22]  HCV 0.16, Nonreact      [07-15-23 @ 21:22]  HIV Nonreact      [07-15-23 @ 21:21]      Tacrolimus  Cyclosporine  Sirolimus  Mycophenolate  BK PCR  CMV PCR  Parvo PCR  EBV PCR [facility] DIVISION OF KIDNEY DISEASES AND HYPERTENSION -- INITIAL CONSULT NOTE  --------------------------------------------------------------------------------  HPI: 53M with PMH of HTN, HLD, DM2, CAD s/p CABG, HF s/p AICD, ESRD on HD since 2016 s/p DDRT (7/14/23) complicated by DGF, and toe amputations due to PVD/osteo, now presenting to the ED with concern for toe infection. Nephrology service consulted for management of immunosuppression of transplanted kidney.         PAST HISTORY  --------------------------------------------------------------------------------  PAST MEDICAL & SURGICAL HISTORY:  Diabetes mellitus  type 2  Foot ulcer due to secondary DM  Coronary artery disease  Acute on chronic systolic heart failure  H/O kidney transplant  Breast Reduction  at age 18  Toe amputation status, left  S/P CABG (coronary artery bypass graft)  AICD (automatic cardioverter/defibrillator) present    FAMILY HISTORY:  Family history of diabetes mellitus (Father)    Social History:  Previous smoking history    ALLERGIES & MEDICATIONS  --------------------------------------------------------------------------------  Allergies  Contrast. (Unknown)    Intolerances    Standing Inpatient Medications  apixaban 5 milliGRAM(s) Oral every 12 hours  aspirin enteric coated 81 milliGRAM(s) Oral daily  atorvastatin 40 milliGRAM(s) Oral at bedtime  carvedilol 3.125 milliGRAM(s) Oral two times a day  insulin glargine Injectable (LANTUS) 21 Unit(s) SubCutaneous at bedtime  insulin lispro (ADMELOG) corrective regimen sliding scale   SubCutaneous at bedtime  insulin lispro (ADMELOG) corrective regimen sliding scale   SubCutaneous three times a day before meals  insulin lispro Injectable (ADMELOG) 10 Unit(s) SubCutaneous three times a day with meals  vancomycin  IVPB 1250 milliGRAM(s) IV Intermittent every 12 hours    PRN Inpatient Medications    REVIEW OF SYSTEMS  --------------------------------------------------------------------------------  Gen: No fevers/chills  Skin: No rashes  Head/Eyes/Ears/Mouth: No headache  Respiratory: No dyspnea, cough  CV: No chest pain  GI: No abdominal pain, diarrhea, constipation, nausea, vomiting  : No increased frequency, dysuria, hematuria  MSK: No edema  Neuro: No dizziness/lightheadedness, weakness  Heme: No easy bruising or bleeding    VITALS/PHYSICAL EXAM  --------------------------------------------------------------------------------  T(C): 36.2 (12-06-23 @ 20:16), Max: 36.7 (12-06-23 @ 17:20)  HR: 74 (12-06-23 @ 20:16) (74 - 80)  BP: 179/84 (12-06-23 @ 20:16) (138/72 - 179/84)  RR: 18 (12-06-23 @ 20:16) (16 - 20)  SpO2: 100% (12-06-23 @ 20:16) (97% - 100%)  Wt(kg): --  Height (cm): 175.3 (12-06-23 @ 10:41)  Weight (kg): 89.4 (12-06-23 @ 10:41)  BMI (kg/m2): 29.1 (12-06-23 @ 10:41)  BSA (m2): 2.05 (12-06-23 @ 10:41)    Physical Exam:  Gen: Not in distress, well-appearing  HEENT: pupils reactive to light, no scelral icterus, moist oral mucosa. No thrush. Supple neck, no JVD  Pulm: normal respiratory effort, lungs clear to auscultation bilaterally   CV: regular rate and rhythm, S1 and S2 normal, no murmur   Abd: normoactive bowel sounds, soft and non distended abdomen. No tenderness, guarding or rigidity. Transplant non tender, no bruit  : No suprapubic tenderness  Back: No spinal or CVA tenderness; no pre sacral edema  Extremities: No edema. Distal pulses 2+ bilaterally   Skin: wram, no rash, no cyanosis   Neuro: Alert and oriented to person, place and time. Normal speech. Normal affect.     LABS/STUDIES  --------------------------------------------------------------------------------              10.2   2.40  >-----------<  168      [12-06-23 @ 13:21]              32.9     142  |  108  |  29  ----------------------------<  87      [12-06-23 @ 13:21]  4.2   |  24  |  1.00        Ca     10.3     [12-06-23 @ 13:21]    TPro  6.3  /  Alb  3.7  /  TBili  0.3  /  DBili  x   /  AST  11  /  ALT  10  /  AlkPhos  97  [12-06-23 @ 13:21]          Creatinine Trend:  SCr 1.00 [12-06 @ 13:21]    Urinalysis - [12-06-23 @ 13:21]      Color  / Appearance  / SG  / pH       Gluc 87 / Ketone   / Bili  / Urobili        Blood  / Protein  / Leuk Est  / Nitrite       RBC  / WBC  / Hyaline  / Gran  / Sq Epi  / Non Sq Epi  / Bacteria       Iron 68, TIBC 185, %sat 37      [08-16-23 @ 06:39]  Ferritin 1010      [08-16-23 @ 06:39]  PTH -- (Ca 10.6)      [11-02-23 @ 11:56]   220  Vitamin D (25OH) 18.0      [11-02-23 @ 11:56]    HBsAb 5.7      [07-15-23 @ 21:22]  HBsAb Reactive      [08-17-23 @ 11:28]  HBsAg Nonreact      [08-17-23 @ 11:28]  HBcAb Nonreact      [07-15-23 @ 21:22]  HCV 0.16, Nonreact      [07-15-23 @ 21:22]  HIV Nonreact      [07-15-23 @ 21:21]      Tacrolimus  Cyclosporine  Sirolimus  Mycophenolate  BK PCR  CMV PCR  Parvo PCR  EBV PCR

## 2023-12-06 NOTE — CONSULT NOTE ADULT - SUBJECTIVE AND OBJECTIVE BOX
Patient is a 53y old  Male who presents with a chief complaint of c/f osteomyelitis (06 Dec 2023 16:33)      HPI:  Tee Salvador is a 52 y/o M with past medical history significant for IDDM, CAD s/p CABG x4v (2015) s/p AICD (2016), PVD (4 stents in RLE), HTN, HLD and ESRD previously on HD via LUE AVF (ligated 9/8/23) now s/p DDRT 7/16/2023 with Simulect -> Thymoglobulin induction. His post-transplant course was complicated by DGF and subtly AMR with +DSAs s/p PLEX/IVIG (8/2023) and L subclavian thrombus remains on Eliquis presents to [facility] ED from wound care clinic on 12/6/23 after concern for osteomyelitis from clinic providers.     Per patient, he has been following with wound clinic. Reports he had a blister on his L anterior foot that had popped and scabbed over. Also reports hearing a "pop" with an inability to walk on his L leg, and attributes this with why he is now ambulating with a cane. He also reports "the doc said the Right foot is worse" reporting during exam today they were able to "touch the bone". He denies foul smelling foot, odors, discharge from wounds. Denies fevers, chills. Reports no changes in blood sugars at home, other than "the up and down" with controlled glucose in AM then elevated to 200s sometimes during the day. Denies dysuria, abdominal pain.  (06 Dec 2023 16:33)      PAST MEDICAL & SURGICAL HISTORY:  Diabetes mellitus  type 2      Foot ulcer due to secondary DM      Coronary artery disease      Acute on chronic systolic heart failure      H/O kidney transplant      Breast Reduction  at age 18      Toe amputation status, left      S/P CABG (coronary artery bypass graft)      AICD (automatic cardioverter/defibrillator) present          MEDICATIONS  (STANDING):  apixaban 5 milliGRAM(s) Oral every 12 hours  aspirin enteric coated 81 milliGRAM(s) Oral daily  atorvastatin 40 milliGRAM(s) Oral at bedtime  carvedilol 3.125 milliGRAM(s) Oral two times a day  insulin glargine Injectable (LANTUS) 21 Unit(s) SubCutaneous at bedtime  insulin lispro (ADMELOG) corrective regimen sliding scale   SubCutaneous three times a day before meals  insulin lispro (ADMELOG) corrective regimen sliding scale   SubCutaneous at bedtime  insulin lispro Injectable (ADMELOG) 10 Unit(s) SubCutaneous three times a day with meals  piperacillin/tazobactam IVPB.- 3.375 Gram(s) IV Intermittent once  vancomycin  IVPB 1250 milliGRAM(s) IV Intermittent every 12 hours    MEDICATIONS  (PRN):      Allergies    Contrast. (Unknown)    Intolerances        VITALS:    Vital Signs Last 24 Hrs  T(C): 36.7 (06 Dec 2023 17:20), Max: 36.7 (06 Dec 2023 17:20)  T(F): 98 (06 Dec 2023 17:20), Max: 98 (06 Dec 2023 17:20)  HR: 78 (06 Dec 2023 17:20) (77 - 80)  BP: 171/53 (06 Dec 2023 17:20) (143/65 - 171/53)  BP(mean): 78 (06 Dec 2023 17:20) (78 - 83)  RR: 19 (06 Dec 2023 17:20) (18 - 20)  SpO2: 100% (06 Dec 2023 17:20) (99% - 100%)    Parameters below as of 06 Dec 2023 17:20  Patient On (Oxygen Delivery Method): room air        LABS:                          10.2   2.40  )-----------( 168      ( 06 Dec 2023 13:21 )             32.9       12-06    142  |  108  |  29<H>  ----------------------------<  87  4.2   |  24  |  1.00    Ca    10.3      06 Dec 2023 13:21    TPro  6.3  /  Alb  3.7  /  TBili  0.3  /  DBili  x   /  AST  11  /  ALT  10  /  AlkPhos  97  12-06      CAPILLARY BLOOD GLUCOSE      POCT Blood Glucose.: 102 mg/dL (06 Dec 2023 16:43)          LOWER EXTREMITY PHYSICAL EXAM:  Vascular: DP/PT 0/4 B/L, CFT <3 seconds B/L, Temperature gradient warm to cool b/l  Neuro: Epicritic sensation absent to the level of digits B/L  Musculoskeletal/Ortho: s/p right foot partial 1st ray resection, right foot partial 2nd toe amputation  skin: right foot third digit with distal tip fibronecrotic wound to subQ with surrounding hyperkeratosis, no drainage, no erythema, no edema. Left foot dorsal medial wound with fibronecrotic wound bed to subQ, moderate malodor, no drainage, with periwound erythema.    RADIOLOGY & ADDITIONAL STUDIES:    < from: Xray Foot AP + Lateral + Oblique, Bilat (12.06.23 @ 12:26) >    ACC: [id number] EXAM:  XR FOOT COMP MIN 3 VIEWS BI   ORDERED BY: LOTTIE SHANKS     PROCEDURE DATE:  12/06/2023          INTERPRETATION:  HISTORY: b/l foot wounds, eval for osteo    TECHNIQUE: 3 views of the left foot; 3 views of the right foot    COMPARISON: Right foot radiographs dated 4/25/2022. Left foot radiographs   dated 5/22/2013    IMPRESSION:    Left foot:    Status post amputation of the fifth metatarsal and proximal aspect of the   fifth proximal phalanx. Cortical irregularity along the fifth distal   phalanx, which may represent osteomyelitis. Correlate clinically for   overlying ulcer. MRI can be completed for further evaluation as   clinically indicated.    No acute displaced fracture or dislocation.    Atherosclerotic calcifications are noted.    Right foot:    Evaluation of the toes is limited due to patient positioning.    Status post amputation of the first ray to the level of the first   metatarsal head. There is minimal cortical irregularity with mild   progressive lucency within the first metatarsal head, which may represent   underlying osteomyelitis. Correlate clinically for overlying ulcer.    Status post amputation of the second toe to the level of the second   proximal phalanx with mild cortical irregularity along the amputation   margin of the second proximal phalanx, which may represent osteomyelitis.   Correlate clinically for overlying ulcer.    MRI of the right foot would be helpful to further evaluate the above   findings.    No acute displaced fracture or dislocation. Atherosclerotic   calcifications are noted.    --- End of Report ---            FLORESITA PAIZ M.D., ATTENDING RADIOLOGIST  This document has been electronically signed. Dec  6 2023  1:24PM    < end of copied text >   Patient is a 53y old  Male who presents with a chief complaint of c/f osteomyelitis (06 Dec 2023 16:33)      HPI:  Tee Salvador is a 54 y/o M with past medical history significant for IDDM, CAD s/p CABG x4v (2015) s/p AICD (2016), PVD (4 stents in RLE), HTN, HLD and ESRD previously on HD via LUE AVF (ligated 9/8/23) now s/p DDRT 7/16/2023 with Simulect -> Thymoglobulin induction. His post-transplant course was complicated by DGF and subtly AMR with +DSAs s/p PLEX/IVIG (8/2023) and L subclavian thrombus remains on Eliquis presents to [facility] ED from wound care clinic on 12/6/23 after concern for osteomyelitis from clinic providers.     Per patient, he has been following with wound clinic. Reports he had a blister on his L anterior foot that had popped and scabbed over. Also reports hearing a "pop" with an inability to walk on his L leg, and attributes this with why he is now ambulating with a cane. He also reports "the doc said the Right foot is worse" reporting during exam today they were able to "touch the bone". He denies foul smelling foot, odors, discharge from wounds. Denies fevers, chills. Reports no changes in blood sugars at home, other than "the up and down" with controlled glucose in AM then elevated to 200s sometimes during the day. Denies dysuria, abdominal pain.  (06 Dec 2023 16:33)      PAST MEDICAL & SURGICAL HISTORY:  Diabetes mellitus  type 2      Foot ulcer due to secondary DM      Coronary artery disease      Acute on chronic systolic heart failure      H/O kidney transplant      Breast Reduction  at age 18      Toe amputation status, left      S/P CABG (coronary artery bypass graft)      AICD (automatic cardioverter/defibrillator) present          MEDICATIONS  (STANDING):  apixaban 5 milliGRAM(s) Oral every 12 hours  aspirin enteric coated 81 milliGRAM(s) Oral daily  atorvastatin 40 milliGRAM(s) Oral at bedtime  carvedilol 3.125 milliGRAM(s) Oral two times a day  insulin glargine Injectable (LANTUS) 21 Unit(s) SubCutaneous at bedtime  insulin lispro (ADMELOG) corrective regimen sliding scale   SubCutaneous three times a day before meals  insulin lispro (ADMELOG) corrective regimen sliding scale   SubCutaneous at bedtime  insulin lispro Injectable (ADMELOG) 10 Unit(s) SubCutaneous three times a day with meals  piperacillin/tazobactam IVPB.- 3.375 Gram(s) IV Intermittent once  vancomycin  IVPB 1250 milliGRAM(s) IV Intermittent every 12 hours    MEDICATIONS  (PRN):      Allergies    Contrast. (Unknown)    Intolerances        VITALS:    Vital Signs Last 24 Hrs  T(C): 36.7 (06 Dec 2023 17:20), Max: 36.7 (06 Dec 2023 17:20)  T(F): 98 (06 Dec 2023 17:20), Max: 98 (06 Dec 2023 17:20)  HR: 78 (06 Dec 2023 17:20) (77 - 80)  BP: 171/53 (06 Dec 2023 17:20) (143/65 - 171/53)  BP(mean): 78 (06 Dec 2023 17:20) (78 - 83)  RR: 19 (06 Dec 2023 17:20) (18 - 20)  SpO2: 100% (06 Dec 2023 17:20) (99% - 100%)    Parameters below as of 06 Dec 2023 17:20  Patient On (Oxygen Delivery Method): room air        LABS:                          10.2   2.40  )-----------( 168      ( 06 Dec 2023 13:21 )             32.9       12-06    142  |  108  |  29<H>  ----------------------------<  87  4.2   |  24  |  1.00    Ca    10.3      06 Dec 2023 13:21    TPro  6.3  /  Alb  3.7  /  TBili  0.3  /  DBili  x   /  AST  11  /  ALT  10  /  AlkPhos  97  12-06      CAPILLARY BLOOD GLUCOSE      POCT Blood Glucose.: 102 mg/dL (06 Dec 2023 16:43)          LOWER EXTREMITY PHYSICAL EXAM:  Vascular: DP/PT 0/4 B/L, CFT <3 seconds B/L, Temperature gradient warm to cool b/l  Neuro: Epicritic sensation absent to the level of digits B/L  Musculoskeletal/Ortho: s/p right foot partial 1st ray resection, right foot partial 2nd toe amputation  skin: right foot third digit with distal tip fibronecrotic wound to subQ with surrounding hyperkeratosis, no drainage, no erythema, no edema. Left foot dorsal medial wound with fibronecrotic wound bed to subQ, moderate malodor, no drainage, with periwound erythema.    RADIOLOGY & ADDITIONAL STUDIES:    < from: Xray Foot AP + Lateral + Oblique, Bilat (12.06.23 @ 12:26) >    ACC: [id number] EXAM:  XR FOOT COMP MIN 3 VIEWS BI   ORDERED BY: LOTTIE SHANKS     PROCEDURE DATE:  12/06/2023          INTERPRETATION:  HISTORY: b/l foot wounds, eval for osteo    TECHNIQUE: 3 views of the left foot; 3 views of the right foot    COMPARISON: Right foot radiographs dated 4/25/2022. Left foot radiographs   dated 5/22/2013    IMPRESSION:    Left foot:    Status post amputation of the fifth metatarsal and proximal aspect of the   fifth proximal phalanx. Cortical irregularity along the fifth distal   phalanx, which may represent osteomyelitis. Correlate clinically for   overlying ulcer. MRI can be completed for further evaluation as   clinically indicated.    No acute displaced fracture or dislocation.    Atherosclerotic calcifications are noted.    Right foot:    Evaluation of the toes is limited due to patient positioning.    Status post amputation of the first ray to the level of the first   metatarsal head. There is minimal cortical irregularity with mild   progressive lucency within the first metatarsal head, which may represent   underlying osteomyelitis. Correlate clinically for overlying ulcer.    Status post amputation of the second toe to the level of the second   proximal phalanx with mild cortical irregularity along the amputation   margin of the second proximal phalanx, which may represent osteomyelitis.   Correlate clinically for overlying ulcer.    MRI of the right foot would be helpful to further evaluate the above   findings.    No acute displaced fracture or dislocation. Atherosclerotic   calcifications are noted.    --- End of Report ---            FLORESITA PAIZ M.D., ATTENDING RADIOLOGIST  This document has been electronically signed. Dec  6 2023  1:24PM    < end of copied text >   Patient is a 53y old  Male who presents with a chief complaint of c/f osteomyelitis (06 Dec 2023 16:33)      HPI:  Tee Salvador is a 54 y/o M with past medical history significant for IDDM, CAD s/p CABG x4v (2015) s/p AICD (2016), PVD (4 stents in RLE), HTN, HLD and ESRD previously on HD via LUE AVF (ligated 9/8/23) now s/p DDRT 7/16/2023 with Simulect -> Thymoglobulin induction. His post-transplant course was complicated by DGF and subtly AMR with +DSAs s/p PLEX/IVIG (8/2023) and L subclavian thrombus remains on Eliquis presents to [facility] ED from wound care clinic on 12/6/23 after concern for osteomyelitis from clinic providers.     Per patient, he has been following with wound clinic. Reports he had a blister on his L anterior foot that had popped and scabbed over. Also reports hearing a "pop" with an inability to walk on his L leg, and attributes this with why he is now ambulating with a cane. He also reports "the doc said the Right foot is worse" reporting during exam today they were able to "touch the bone". He denies foul smelling foot, odors, discharge from wounds. Denies fevers, chills. Reports no changes in blood sugars at home, other than "the up and down" with controlled glucose in AM then elevated to 200s sometimes during the day. Denies dysuria, abdominal pain.  (06 Dec 2023 16:33)      PAST MEDICAL & SURGICAL HISTORY:  Diabetes mellitus  type 2      Foot ulcer due to secondary DM      Coronary artery disease      Acute on chronic systolic heart failure      H/O kidney transplant      Breast Reduction  at age 18      Toe amputation status, left      S/P CABG (coronary artery bypass graft)      AICD (automatic cardioverter/defibrillator) present          MEDICATIONS  (STANDING):  apixaban 5 milliGRAM(s) Oral every 12 hours  aspirin enteric coated 81 milliGRAM(s) Oral daily  atorvastatin 40 milliGRAM(s) Oral at bedtime  carvedilol 3.125 milliGRAM(s) Oral two times a day  insulin glargine Injectable (LANTUS) 21 Unit(s) SubCutaneous at bedtime  insulin lispro (ADMELOG) corrective regimen sliding scale   SubCutaneous three times a day before meals  insulin lispro (ADMELOG) corrective regimen sliding scale   SubCutaneous at bedtime  insulin lispro Injectable (ADMELOG) 10 Unit(s) SubCutaneous three times a day with meals  piperacillin/tazobactam IVPB.- 3.375 Gram(s) IV Intermittent once  vancomycin  IVPB 1250 milliGRAM(s) IV Intermittent every 12 hours    MEDICATIONS  (PRN):      Allergies    Contrast. (Unknown)    Intolerances        VITALS:    Vital Signs Last 24 Hrs  T(C): 36.7 (06 Dec 2023 17:20), Max: 36.7 (06 Dec 2023 17:20)  T(F): 98 (06 Dec 2023 17:20), Max: 98 (06 Dec 2023 17:20)  HR: 78 (06 Dec 2023 17:20) (77 - 80)  BP: 171/53 (06 Dec 2023 17:20) (143/65 - 171/53)  BP(mean): 78 (06 Dec 2023 17:20) (78 - 83)  RR: 19 (06 Dec 2023 17:20) (18 - 20)  SpO2: 100% (06 Dec 2023 17:20) (99% - 100%)    Parameters below as of 06 Dec 2023 17:20  Patient On (Oxygen Delivery Method): room air        LABS:                          10.2   2.40  )-----------( 168      ( 06 Dec 2023 13:21 )             32.9       12-06    142  |  108  |  29<H>  ----------------------------<  87  4.2   |  24  |  1.00    Ca    10.3      06 Dec 2023 13:21    TPro  6.3  /  Alb  3.7  /  TBili  0.3  /  DBili  x   /  AST  11  /  ALT  10  /  AlkPhos  97  12-06      CAPILLARY BLOOD GLUCOSE      POCT Blood Glucose.: 102 mg/dL (06 Dec 2023 16:43)          LOWER EXTREMITY PHYSICAL EXAM:  Vascular: DP/PT 0/4 B/L, CFT <3 seconds B/L, Temperature gradient warm to cool b/l  Neuro: Epicritic sensation absent to the level of digits B/L  Musculoskeletal/Ortho: s/p right foot partial 1st ray resection, s/p right foot partial 2nd toe amputation, s/p left foot 5th MPJ resection  skin: right foot third digit with distal tip fibronecrotic wound to subQ with surrounding hyperkeratosis, no drainage, no erythema, no edema. Left foot dorsal medial wound with fibronecrotic wound bed to subQ, moderate malodor, no drainage, with periwound erythema.    RADIOLOGY & ADDITIONAL STUDIES:    < from: Xray Foot AP + Lateral + Oblique, Bilat (12.06.23 @ 12:26) >    ACC: [id number] EXAM:  XR FOOT COMP MIN 3 VIEWS BI   ORDERED BY: LOTTIE SHANKS     PROCEDURE DATE:  12/06/2023          INTERPRETATION:  HISTORY: b/l foot wounds, eval for osteo    TECHNIQUE: 3 views of the left foot; 3 views of the right foot    COMPARISON: Right foot radiographs dated 4/25/2022. Left foot radiographs   dated 5/22/2013    IMPRESSION:    Left foot:    Status post amputation of the fifth metatarsal and proximal aspect of the   fifth proximal phalanx. Cortical irregularity along the fifth distal   phalanx, which may represent osteomyelitis. Correlate clinically for   overlying ulcer. MRI can be completed for further evaluation as   clinically indicated.    No acute displaced fracture or dislocation.    Atherosclerotic calcifications are noted.    Right foot:    Evaluation of the toes is limited due to patient positioning.    Status post amputation of the first ray to the level of the first   metatarsal head. There is minimal cortical irregularity with mild   progressive lucency within the first metatarsal head, which may represent   underlying osteomyelitis. Correlate clinically for overlying ulcer.    Status post amputation of the second toe to the level of the second   proximal phalanx with mild cortical irregularity along the amputation   margin of the second proximal phalanx, which may represent osteomyelitis.   Correlate clinically for overlying ulcer.    MRI of the right foot would be helpful to further evaluate the above   findings.    No acute displaced fracture or dislocation. Atherosclerotic   calcifications are noted.    --- End of Report ---            FLORESITA PAIZ M.D., ATTENDING RADIOLOGIST  This document has been electronically signed. Dec  6 2023  1:24PM    < end of copied text >   Patient is a 53y old  Male who presents with a chief complaint of c/f osteomyelitis (06 Dec 2023 16:33)      HPI:  Tee Salvador is a 54 y/o M with past medical history significant for IDDM, CAD s/p CABG x4v (2015) s/p AICD (2016), PVD (4 stents in RLE), HTN, HLD and ESRD previously on HD via LUE AVF (ligated 9/8/23) now s/p DDRT 7/16/2023 with Simulect -> Thymoglobulin induction. His post-transplant course was complicated by DGF and subtly AMR with +DSAs s/p PLEX/IVIG (8/2023) and L subclavian thrombus remains on Eliquis presents to [facility] ED from wound care clinic on 12/6/23 after concern for osteomyelitis from clinic providers.     Per patient, he has been following with wound clinic. Reports he had a blister on his L anterior foot that had popped and scabbed over. Also reports hearing a "pop" with an inability to walk on his L leg, and attributes this with why he is now ambulating with a cane. He also reports "the doc said the Right foot is worse" reporting during exam today they were able to "touch the bone". He denies foul smelling foot, odors, discharge from wounds. Denies fevers, chills. Reports no changes in blood sugars at home, other than "the up and down" with controlled glucose in AM then elevated to 200s sometimes during the day. Denies dysuria, abdominal pain.  (06 Dec 2023 16:33)      PAST MEDICAL & SURGICAL HISTORY:  Diabetes mellitus  type 2      Foot ulcer due to secondary DM      Coronary artery disease      Acute on chronic systolic heart failure      H/O kidney transplant      Breast Reduction  at age 18      Toe amputation status, left      S/P CABG (coronary artery bypass graft)      AICD (automatic cardioverter/defibrillator) present          MEDICATIONS  (STANDING):  apixaban 5 milliGRAM(s) Oral every 12 hours  aspirin enteric coated 81 milliGRAM(s) Oral daily  atorvastatin 40 milliGRAM(s) Oral at bedtime  carvedilol 3.125 milliGRAM(s) Oral two times a day  insulin glargine Injectable (LANTUS) 21 Unit(s) SubCutaneous at bedtime  insulin lispro (ADMELOG) corrective regimen sliding scale   SubCutaneous three times a day before meals  insulin lispro (ADMELOG) corrective regimen sliding scale   SubCutaneous at bedtime  insulin lispro Injectable (ADMELOG) 10 Unit(s) SubCutaneous three times a day with meals  piperacillin/tazobactam IVPB.- 3.375 Gram(s) IV Intermittent once  vancomycin  IVPB 1250 milliGRAM(s) IV Intermittent every 12 hours    MEDICATIONS  (PRN):      Allergies    Contrast. (Unknown)    Intolerances        VITALS:    Vital Signs Last 24 Hrs  T(C): 36.7 (06 Dec 2023 17:20), Max: 36.7 (06 Dec 2023 17:20)  T(F): 98 (06 Dec 2023 17:20), Max: 98 (06 Dec 2023 17:20)  HR: 78 (06 Dec 2023 17:20) (77 - 80)  BP: 171/53 (06 Dec 2023 17:20) (143/65 - 171/53)  BP(mean): 78 (06 Dec 2023 17:20) (78 - 83)  RR: 19 (06 Dec 2023 17:20) (18 - 20)  SpO2: 100% (06 Dec 2023 17:20) (99% - 100%)    Parameters below as of 06 Dec 2023 17:20  Patient On (Oxygen Delivery Method): room air        LABS:                          10.2   2.40  )-----------( 168      ( 06 Dec 2023 13:21 )             32.9       12-06    142  |  108  |  29<H>  ----------------------------<  87  4.2   |  24  |  1.00    Ca    10.3      06 Dec 2023 13:21    TPro  6.3  /  Alb  3.7  /  TBili  0.3  /  DBili  x   /  AST  11  /  ALT  10  /  AlkPhos  97  12-06      CAPILLARY BLOOD GLUCOSE      POCT Blood Glucose.: 102 mg/dL (06 Dec 2023 16:43)          LOWER EXTREMITY PHYSICAL EXAM:  Vascular: DP/PT 0/4 B/L, CFT <3 seconds B/L, Temperature gradient warm to cool b/l  Neuro: Epicritic sensation absent to the level of digits B/L  Musculoskeletal/Ortho: s/p right foot partial 1st ray resection, s/p right foot partial 2nd toe amputation, s/p left foot 5th MPJ resection  skin: right foot third digit with distal tip fibronecrotic wound to subQ with surrounding hyperkeratosis, no drainage, no erythema, no edema. Left foot dorsal medial wound with fibronecrotic wound bed to subQ, moderate malodor, no drainage, with periwound erythema.    RADIOLOGY & ADDITIONAL STUDIES:    < from: Xray Foot AP + Lateral + Oblique, Bilat (12.06.23 @ 12:26) >    ACC: [id number] EXAM:  XR FOOT COMP MIN 3 VIEWS BI   ORDERED BY: LOTTIE SHANKS     PROCEDURE DATE:  12/06/2023          INTERPRETATION:  HISTORY: b/l foot wounds, eval for osteo    TECHNIQUE: 3 views of the left foot; 3 views of the right foot    COMPARISON: Right foot radiographs dated 4/25/2022. Left foot radiographs   dated 5/22/2013    IMPRESSION:    Left foot:    Status post amputation of the fifth metatarsal and proximal aspect of the   fifth proximal phalanx. Cortical irregularity along the fifth distal   phalanx, which may represent osteomyelitis. Correlate clinically for   overlying ulcer. MRI can be completed for further evaluation as   clinically indicated.    No acute displaced fracture or dislocation.    Atherosclerotic calcifications are noted.    Right foot:    Evaluation of the toes is limited due to patient positioning.    Status post amputation of the first ray to the level of the first   metatarsal head. There is minimal cortical irregularity with mild   progressive lucency within the first metatarsal head, which may represent   underlying osteomyelitis. Correlate clinically for overlying ulcer.    Status post amputation of the second toe to the level of the second   proximal phalanx with mild cortical irregularity along the amputation   margin of the second proximal phalanx, which may represent osteomyelitis.   Correlate clinically for overlying ulcer.    MRI of the right foot would be helpful to further evaluate the above   findings.    No acute displaced fracture or dislocation. Atherosclerotic   calcifications are noted.    --- End of Report ---            FLORESITA PAIZ M.D., ATTENDING RADIOLOGIST  This document has been electronically signed. Dec  6 2023  1:24PM    < end of copied text >   Patient is a 53y old  Male who presents with a chief complaint of c/f osteomyelitis (06 Dec 2023 16:33)      HPI:  Tee Salvador is a 52 y/o M with past medical history significant for IDDM, CAD s/p CABG x4v (2015) s/p AICD (2016), PVD (4 stents in RLE), HTN, HLD and ESRD previously on HD via LUE AVF (ligated 9/8/23) now s/p DDRT 7/16/2023 with Simulect -> Thymoglobulin induction. His post-transplant course was complicated by DGF and subtly AMR with +DSAs s/p PLEX/IVIG (8/2023) and L subclavian thrombus remains on Eliquis presents to [facility] ED from wound care clinic on 12/6/23 after concern for osteomyelitis from clinic providers.     Per patient, he has been following with wound clinic. Reports he had a blister on his L anterior foot that had popped and scabbed over. Also reports hearing a "pop" with an inability to walk on his L leg, and attributes this with why he is now ambulating with a cane. He also reports "the doc said the Right foot is worse" reporting during exam today they were able to "touch the bone". He denies foul smelling foot, odors, discharge from wounds. Denies fevers, chills. Reports no changes in blood sugars at home, other than "the up and down" with controlled glucose in AM then elevated to 200s sometimes during the day. Denies dysuria, abdominal pain.  (06 Dec 2023 16:33)      PAST MEDICAL & SURGICAL HISTORY:  Diabetes mellitus  type 2      Foot ulcer due to secondary DM      Coronary artery disease      Acute on chronic systolic heart failure      H/O kidney transplant      Breast Reduction  at age 18      Toe amputation status, left      S/P CABG (coronary artery bypass graft)      AICD (automatic cardioverter/defibrillator) present          MEDICATIONS  (STANDING):  apixaban 5 milliGRAM(s) Oral every 12 hours  aspirin enteric coated 81 milliGRAM(s) Oral daily  atorvastatin 40 milliGRAM(s) Oral at bedtime  carvedilol 3.125 milliGRAM(s) Oral two times a day  insulin glargine Injectable (LANTUS) 21 Unit(s) SubCutaneous at bedtime  insulin lispro (ADMELOG) corrective regimen sliding scale   SubCutaneous three times a day before meals  insulin lispro (ADMELOG) corrective regimen sliding scale   SubCutaneous at bedtime  insulin lispro Injectable (ADMELOG) 10 Unit(s) SubCutaneous three times a day with meals  piperacillin/tazobactam IVPB.- 3.375 Gram(s) IV Intermittent once  vancomycin  IVPB 1250 milliGRAM(s) IV Intermittent every 12 hours    MEDICATIONS  (PRN):      Allergies    Contrast. (Unknown)    Intolerances        VITALS:    Vital Signs Last 24 Hrs  T(C): 36.7 (06 Dec 2023 17:20), Max: 36.7 (06 Dec 2023 17:20)  T(F): 98 (06 Dec 2023 17:20), Max: 98 (06 Dec 2023 17:20)  HR: 78 (06 Dec 2023 17:20) (77 - 80)  BP: 171/53 (06 Dec 2023 17:20) (143/65 - 171/53)  BP(mean): 78 (06 Dec 2023 17:20) (78 - 83)  RR: 19 (06 Dec 2023 17:20) (18 - 20)  SpO2: 100% (06 Dec 2023 17:20) (99% - 100%)    Parameters below as of 06 Dec 2023 17:20  Patient On (Oxygen Delivery Method): room air        LABS:                          10.2   2.40  )-----------( 168      ( 06 Dec 2023 13:21 )             32.9       12-06    142  |  108  |  29<H>  ----------------------------<  87  4.2   |  24  |  1.00    Ca    10.3      06 Dec 2023 13:21    TPro  6.3  /  Alb  3.7  /  TBili  0.3  /  DBili  x   /  AST  11  /  ALT  10  /  AlkPhos  97  12-06      CAPILLARY BLOOD GLUCOSE      POCT Blood Glucose.: 102 mg/dL (06 Dec 2023 16:43)          LOWER EXTREMITY PHYSICAL EXAM:  Vascular: DP/PT 0/4 B/L, CFT <3 seconds B/L, Temperature gradient warm to cool b/l  Neuro: Epicritic sensation absent to the level of digits B/L  Musculoskeletal/Ortho: s/p right foot partial 1st ray resection, s/p right foot partial 2nd toe amputation, s/p left foot 5th MPJ resection  skin:  Right foot third digit with distal tip fibronecrotic wound to bone with surrounding hyperkeratosis, no drainage, no erythema, no edema. Left foot dorsal medial wound with fibronecrotic wound bed to subQ, moderate malodor, scant serous drainage, with periwound erythema.      RADIOLOGY & ADDITIONAL STUDIES:    < from: Xray Foot AP + Lateral + Oblique, Bilat (12.06.23 @ 12:26) >    ACC: [id number] EXAM:  XR FOOT COMP MIN 3 VIEWS BI   ORDERED BY: LOTTIE SHANKS     PROCEDURE DATE:  12/06/2023          INTERPRETATION:  HISTORY: b/l foot wounds, eval for osteo    TECHNIQUE: 3 views of the left foot; 3 views of the right foot    COMPARISON: Right foot radiographs dated 4/25/2022. Left foot radiographs   dated 5/22/2013    IMPRESSION:    Left foot:    Status post amputation of the fifth metatarsal and proximal aspect of the   fifth proximal phalanx. Cortical irregularity along the fifth distal   phalanx, which may represent osteomyelitis. Correlate clinically for   overlying ulcer. MRI can be completed for further evaluation as   clinically indicated.    No acute displaced fracture or dislocation.    Atherosclerotic calcifications are noted.    Right foot:    Evaluation of the toes is limited due to patient positioning.    Status post amputation of the first ray to the level of the first   metatarsal head. There is minimal cortical irregularity with mild   progressive lucency within the first metatarsal head, which may represent   underlying osteomyelitis. Correlate clinically for overlying ulcer.    Status post amputation of the second toe to the level of the second   proximal phalanx with mild cortical irregularity along the amputation   margin of the second proximal phalanx, which may represent osteomyelitis.   Correlate clinically for overlying ulcer.    MRI of the right foot would be helpful to further evaluate the above   findings.    No acute displaced fracture or dislocation. Atherosclerotic   calcifications are noted.    --- End of Report ---            FLORESITA PAIZ M.D., ATTENDING RADIOLOGIST  This document has been electronically signed. Dec  6 2023  1:24PM    < end of copied text >   Patient is a 53y old  Male who presents with a chief complaint of c/f osteomyelitis (06 Dec 2023 16:33)      HPI:  Tee Salvador is a 52 y/o M with past medical history significant for IDDM, CAD s/p CABG x4v (2015) s/p AICD (2016), PVD (4 stents in RLE), HTN, HLD and ESRD previously on HD via LUE AVF (ligated 9/8/23) now s/p DDRT 7/16/2023 with Simulect -> Thymoglobulin induction. His post-transplant course was complicated by DGF and subtly AMR with +DSAs s/p PLEX/IVIG (8/2023) and L subclavian thrombus remains on Eliquis presents to [facility] ED from wound care clinic on 12/6/23 after concern for osteomyelitis from clinic providers.     Per patient, he has been following with wound clinic. Reports he had a blister on his L anterior foot that had popped and scabbed over. Also reports hearing a "pop" with an inability to walk on his L leg, and attributes this with why he is now ambulating with a cane. He also reports "the doc said the Right foot is worse" reporting during exam today they were able to "touch the bone". He denies foul smelling foot, odors, discharge from wounds. Denies fevers, chills. Reports no changes in blood sugars at home, other than "the up and down" with controlled glucose in AM then elevated to 200s sometimes during the day. Denies dysuria, abdominal pain.  (06 Dec 2023 16:33)      PAST MEDICAL & SURGICAL HISTORY:  Diabetes mellitus  type 2      Foot ulcer due to secondary DM      Coronary artery disease      Acute on chronic systolic heart failure      H/O kidney transplant      Breast Reduction  at age 18      Toe amputation status, left      S/P CABG (coronary artery bypass graft)      AICD (automatic cardioverter/defibrillator) present          MEDICATIONS  (STANDING):  apixaban 5 milliGRAM(s) Oral every 12 hours  aspirin enteric coated 81 milliGRAM(s) Oral daily  atorvastatin 40 milliGRAM(s) Oral at bedtime  carvedilol 3.125 milliGRAM(s) Oral two times a day  insulin glargine Injectable (LANTUS) 21 Unit(s) SubCutaneous at bedtime  insulin lispro (ADMELOG) corrective regimen sliding scale   SubCutaneous three times a day before meals  insulin lispro (ADMELOG) corrective regimen sliding scale   SubCutaneous at bedtime  insulin lispro Injectable (ADMELOG) 10 Unit(s) SubCutaneous three times a day with meals  piperacillin/tazobactam IVPB.- 3.375 Gram(s) IV Intermittent once  vancomycin  IVPB 1250 milliGRAM(s) IV Intermittent every 12 hours    MEDICATIONS  (PRN):      Allergies    Contrast. (Unknown)    Intolerances        VITALS:    Vital Signs Last 24 Hrs  T(C): 36.7 (06 Dec 2023 17:20), Max: 36.7 (06 Dec 2023 17:20)  T(F): 98 (06 Dec 2023 17:20), Max: 98 (06 Dec 2023 17:20)  HR: 78 (06 Dec 2023 17:20) (77 - 80)  BP: 171/53 (06 Dec 2023 17:20) (143/65 - 171/53)  BP(mean): 78 (06 Dec 2023 17:20) (78 - 83)  RR: 19 (06 Dec 2023 17:20) (18 - 20)  SpO2: 100% (06 Dec 2023 17:20) (99% - 100%)    Parameters below as of 06 Dec 2023 17:20  Patient On (Oxygen Delivery Method): room air        LABS:                          10.2   2.40  )-----------( 168      ( 06 Dec 2023 13:21 )             32.9       12-06    142  |  108  |  29<H>  ----------------------------<  87  4.2   |  24  |  1.00    Ca    10.3      06 Dec 2023 13:21    TPro  6.3  /  Alb  3.7  /  TBili  0.3  /  DBili  x   /  AST  11  /  ALT  10  /  AlkPhos  97  12-06      CAPILLARY BLOOD GLUCOSE      POCT Blood Glucose.: 102 mg/dL (06 Dec 2023 16:43)          LOWER EXTREMITY PHYSICAL EXAM:  Vascular: DP/PT 0/4 B/L, CFT <3 seconds B/L, Temperature gradient warm to cool b/l  Neuro: Epicritic sensation absent to the level of digits B/L  Musculoskeletal/Ortho: s/p right foot partial 1st ray resection, s/p right foot partial 2nd toe amputation, s/p left foot 5th MPJ resection  skin:  Right foot third digit with distal tip fibronecrotic wound to bone with surrounding hyperkeratosis, no drainage, no erythema, no edema. Left foot dorsal medial wound with fibronecrotic wound bed to subQ, moderate malodor, scant serous drainage, with periwound erythema.      RADIOLOGY & ADDITIONAL STUDIES:    < from: Xray Foot AP + Lateral + Oblique, Bilat (12.06.23 @ 12:26) >    ACC: [id number] EXAM:  XR FOOT COMP MIN 3 VIEWS BI   ORDERED BY: LOTTIE SHANKS     PROCEDURE DATE:  12/06/2023          INTERPRETATION:  HISTORY: b/l foot wounds, eval for osteo    TECHNIQUE: 3 views of the left foot; 3 views of the right foot    COMPARISON: Right foot radiographs dated 4/25/2022. Left foot radiographs   dated 5/22/2013    IMPRESSION:    Left foot:    Status post amputation of the fifth metatarsal and proximal aspect of the   fifth proximal phalanx. Cortical irregularity along the fifth distal   phalanx, which may represent osteomyelitis. Correlate clinically for   overlying ulcer. MRI can be completed for further evaluation as   clinically indicated.    No acute displaced fracture or dislocation.    Atherosclerotic calcifications are noted.    Right foot:    Evaluation of the toes is limited due to patient positioning.    Status post amputation of the first ray to the level of the first   metatarsal head. There is minimal cortical irregularity with mild   progressive lucency within the first metatarsal head, which may represent   underlying osteomyelitis. Correlate clinically for overlying ulcer.    Status post amputation of the second toe to the level of the second   proximal phalanx with mild cortical irregularity along the amputation   margin of the second proximal phalanx, which may represent osteomyelitis.   Correlate clinically for overlying ulcer.    MRI of the right foot would be helpful to further evaluate the above   findings.    No acute displaced fracture or dislocation. Atherosclerotic   calcifications are noted.    --- End of Report ---            FLORESITA PAIZ M.D., ATTENDING RADIOLOGIST  This document has been electronically signed. Dec  6 2023  1:24PM    < end of copied text >

## 2023-12-06 NOTE — CONSULT NOTE ADULT - TIME BILLING
face-to-face encounter, review of extensive medical records in this and prior charts, laboratory findings, radiographic and microbiology results; documentation as noted above and discussion of diagnostic impressions and plan with the patient and team
Kidney recipient with functioning allograft  Noted creatinine, noted post transplant course.  Comorbidities reviewed   Admitted with foot ulcer suspected osteo, PAD  DM, CAD, HLD  Reviewed immunosuppression  Suggestions:  Podiatry, transplant ID and vascular surgery (Dr. Malone) f/u  Blood culture  Broad spectrum antimicrobial coverage  Agree with holding MMF until blood culture is reported  Continue Tac/prednisone  Will follow  I was present during and reviewed clinical and lab data as well as assessment and plan as documented by the house staff as noted. Please contact if any additional questions with any change in clinical condition or on availability of any additional information or reports.

## 2023-12-06 NOTE — PATIENT PROFILE ADULT - FUNCTIONAL ASSESSMENT - BASIC MOBILITY 6.
4-calculated by average/Not able to assess (calculate score using [facility] averaging method) 4-calculated by average/Not able to assess (calculate score using [facility] averaging method)

## 2023-12-06 NOTE — CONSULT NOTE ADULT - CRANIAL NERVE
mod art  insuff w mod trophic  skin changes  callie dpa and pta +1   rt toe 3  tip ulcer  4mm diam w limited  granulation tissue and ischemic/dry gangrene  left  medial distal lateral foot dorsum wound 3cm x 1cm x 5mm

## 2023-12-06 NOTE — H&P ADULT - NSHPLABSRESULTS_GEN_ALL_CORE
10.2   2.40  )-----------( 168      ( 06 Dec 2023 13:21 )             32.9     12-06    142  |  108  |  29<H>  ----------------------------<  87  4.2   |  24  |  1.00    Ca    10.3      06 Dec 2023 13:21    TPro  6.3  /  Alb  3.7  /  TBili  0.3  /  DBili  x   /  AST  11  /  ALT  10  /  AlkPhos  97  12-06    CAPILLARY BLOOD GLUCOSE          COVID-19 PCR: [place] (15 Jul 2023 21:22)

## 2023-12-07 LAB
ANION GAP SERPL CALC-SCNC: 9 MMOL/L — SIGNIFICANT CHANGE UP (ref 5–17)
ANION GAP SERPL CALC-SCNC: 9 MMOL/L — SIGNIFICANT CHANGE UP (ref 5–17)
BASOPHILS # BLD AUTO: 0.04 K/UL — SIGNIFICANT CHANGE UP (ref 0–0.2)
BASOPHILS # BLD AUTO: 0.04 K/UL — SIGNIFICANT CHANGE UP (ref 0–0.2)
BASOPHILS NFR BLD AUTO: 1.9 % — SIGNIFICANT CHANGE UP (ref 0–2)
BASOPHILS NFR BLD AUTO: 1.9 % — SIGNIFICANT CHANGE UP (ref 0–2)
BUN SERPL-MCNC: 21 MG/DL — SIGNIFICANT CHANGE UP (ref 7–23)
BUN SERPL-MCNC: 21 MG/DL — SIGNIFICANT CHANGE UP (ref 7–23)
CALCIUM SERPL-MCNC: 10.2 MG/DL — SIGNIFICANT CHANGE UP (ref 8.4–10.5)
CALCIUM SERPL-MCNC: 10.2 MG/DL — SIGNIFICANT CHANGE UP (ref 8.4–10.5)
CHLORIDE SERPL-SCNC: 106 MMOL/L — SIGNIFICANT CHANGE UP (ref 96–108)
CHLORIDE SERPL-SCNC: 106 MMOL/L — SIGNIFICANT CHANGE UP (ref 96–108)
CMV DNA CSF QL NAA+PROBE: SIGNIFICANT CHANGE UP IU/ML
CMV DNA CSF QL NAA+PROBE: SIGNIFICANT CHANGE UP IU/ML
CMV DNA SPEC NAA+PROBE-LOG#: SIGNIFICANT CHANGE UP LOG10IU/ML
CMV DNA SPEC NAA+PROBE-LOG#: SIGNIFICANT CHANGE UP LOG10IU/ML
CO2 SERPL-SCNC: 22 MMOL/L — SIGNIFICANT CHANGE UP (ref 22–31)
CO2 SERPL-SCNC: 22 MMOL/L — SIGNIFICANT CHANGE UP (ref 22–31)
CREAT SERPL-MCNC: 1.07 MG/DL — SIGNIFICANT CHANGE UP (ref 0.5–1.3)
CREAT SERPL-MCNC: 1.07 MG/DL — SIGNIFICANT CHANGE UP (ref 0.5–1.3)
CRP SERPL-MCNC: 32 MG/L — HIGH (ref 0–4)
CRP SERPL-MCNC: 32 MG/L — HIGH (ref 0–4)
EGFR: 84 ML/MIN/1.73M2 — SIGNIFICANT CHANGE UP
EGFR: 84 ML/MIN/1.73M2 — SIGNIFICANT CHANGE UP
EOSINOPHIL # BLD AUTO: 0 K/UL — SIGNIFICANT CHANGE UP (ref 0–0.5)
EOSINOPHIL # BLD AUTO: 0 K/UL — SIGNIFICANT CHANGE UP (ref 0–0.5)
EOSINOPHIL NFR BLD AUTO: 0 % — SIGNIFICANT CHANGE UP (ref 0–6)
EOSINOPHIL NFR BLD AUTO: 0 % — SIGNIFICANT CHANGE UP (ref 0–6)
ERYTHROCYTE [SEDIMENTATION RATE] IN BLOOD: 15 MM/HR — SIGNIFICANT CHANGE UP (ref 0–20)
ERYTHROCYTE [SEDIMENTATION RATE] IN BLOOD: 15 MM/HR — SIGNIFICANT CHANGE UP (ref 0–20)
ERYTHROCYTE [SEDIMENTATION RATE] IN BLOOD: 39 MM/HR — HIGH (ref 0–20)
ERYTHROCYTE [SEDIMENTATION RATE] IN BLOOD: 39 MM/HR — HIGH (ref 0–20)
GLUCOSE BLDC GLUCOMTR-MCNC: 137 MG/DL — HIGH (ref 70–99)
GLUCOSE BLDC GLUCOMTR-MCNC: 137 MG/DL — HIGH (ref 70–99)
GLUCOSE BLDC GLUCOMTR-MCNC: 164 MG/DL — HIGH (ref 70–99)
GLUCOSE BLDC GLUCOMTR-MCNC: 164 MG/DL — HIGH (ref 70–99)
GLUCOSE BLDC GLUCOMTR-MCNC: 185 MG/DL — HIGH (ref 70–99)
GLUCOSE BLDC GLUCOMTR-MCNC: 185 MG/DL — HIGH (ref 70–99)
GLUCOSE BLDC GLUCOMTR-MCNC: 186 MG/DL — HIGH (ref 70–99)
GLUCOSE BLDC GLUCOMTR-MCNC: 186 MG/DL — HIGH (ref 70–99)
GLUCOSE BLDC GLUCOMTR-MCNC: 72 MG/DL — SIGNIFICANT CHANGE UP (ref 70–99)
GLUCOSE BLDC GLUCOMTR-MCNC: 72 MG/DL — SIGNIFICANT CHANGE UP (ref 70–99)
GLUCOSE SERPL-MCNC: 144 MG/DL — HIGH (ref 70–99)
GLUCOSE SERPL-MCNC: 144 MG/DL — HIGH (ref 70–99)
HCT VFR BLD CALC: 35.4 % — LOW (ref 39–50)
HCT VFR BLD CALC: 35.4 % — LOW (ref 39–50)
HGB BLD-MCNC: 11 G/DL — LOW (ref 13–17)
HGB BLD-MCNC: 11 G/DL — LOW (ref 13–17)
IMM GRANULOCYTES NFR BLD AUTO: 4.7 % — HIGH (ref 0–0.9)
IMM GRANULOCYTES NFR BLD AUTO: 4.7 % — HIGH (ref 0–0.9)
LYMPHOCYTES # BLD AUTO: 0.16 K/UL — LOW (ref 1–3.3)
LYMPHOCYTES # BLD AUTO: 0.16 K/UL — LOW (ref 1–3.3)
LYMPHOCYTES # BLD AUTO: 7.4 % — LOW (ref 13–44)
LYMPHOCYTES # BLD AUTO: 7.4 % — LOW (ref 13–44)
MAGNESIUM SERPL-MCNC: 1.7 MG/DL — SIGNIFICANT CHANGE UP (ref 1.6–2.6)
MAGNESIUM SERPL-MCNC: 1.7 MG/DL — SIGNIFICANT CHANGE UP (ref 1.6–2.6)
MCHC RBC-ENTMCNC: 28.5 PG — SIGNIFICANT CHANGE UP (ref 27–34)
MCHC RBC-ENTMCNC: 28.5 PG — SIGNIFICANT CHANGE UP (ref 27–34)
MCHC RBC-ENTMCNC: 31.1 GM/DL — LOW (ref 32–36)
MCHC RBC-ENTMCNC: 31.1 GM/DL — LOW (ref 32–36)
MCV RBC AUTO: 91.7 FL — SIGNIFICANT CHANGE UP (ref 80–100)
MCV RBC AUTO: 91.7 FL — SIGNIFICANT CHANGE UP (ref 80–100)
MONOCYTES # BLD AUTO: 0.3 K/UL — SIGNIFICANT CHANGE UP (ref 0–0.9)
MONOCYTES # BLD AUTO: 0.3 K/UL — SIGNIFICANT CHANGE UP (ref 0–0.9)
MONOCYTES NFR BLD AUTO: 14 % — SIGNIFICANT CHANGE UP (ref 2–14)
MONOCYTES NFR BLD AUTO: 14 % — SIGNIFICANT CHANGE UP (ref 2–14)
MRSA PCR RESULT.: SIGNIFICANT CHANGE UP
MRSA PCR RESULT.: SIGNIFICANT CHANGE UP
NEUTROPHILS # BLD AUTO: 1.55 K/UL — LOW (ref 1.8–7.4)
NEUTROPHILS # BLD AUTO: 1.55 K/UL — LOW (ref 1.8–7.4)
NEUTROPHILS NFR BLD AUTO: 72 % — SIGNIFICANT CHANGE UP (ref 43–77)
NEUTROPHILS NFR BLD AUTO: 72 % — SIGNIFICANT CHANGE UP (ref 43–77)
NRBC # BLD: 0 /100 WBCS — SIGNIFICANT CHANGE UP (ref 0–0)
NRBC # BLD: 0 /100 WBCS — SIGNIFICANT CHANGE UP (ref 0–0)
PHOSPHATE SERPL-MCNC: 2.1 MG/DL — LOW (ref 2.5–4.5)
PHOSPHATE SERPL-MCNC: 2.1 MG/DL — LOW (ref 2.5–4.5)
PLATELET # BLD AUTO: 169 K/UL — SIGNIFICANT CHANGE UP (ref 150–400)
PLATELET # BLD AUTO: 169 K/UL — SIGNIFICANT CHANGE UP (ref 150–400)
POTASSIUM SERPL-MCNC: 4.5 MMOL/L — SIGNIFICANT CHANGE UP (ref 3.5–5.3)
POTASSIUM SERPL-MCNC: 4.5 MMOL/L — SIGNIFICANT CHANGE UP (ref 3.5–5.3)
POTASSIUM SERPL-SCNC: 4.5 MMOL/L — SIGNIFICANT CHANGE UP (ref 3.5–5.3)
POTASSIUM SERPL-SCNC: 4.5 MMOL/L — SIGNIFICANT CHANGE UP (ref 3.5–5.3)
RBC # BLD: 3.86 M/UL — LOW (ref 4.2–5.8)
RBC # BLD: 3.86 M/UL — LOW (ref 4.2–5.8)
RBC # FLD: 13.1 % — SIGNIFICANT CHANGE UP (ref 10.3–14.5)
RBC # FLD: 13.1 % — SIGNIFICANT CHANGE UP (ref 10.3–14.5)
S AUREUS DNA NOSE QL NAA+PROBE: SIGNIFICANT CHANGE UP
S AUREUS DNA NOSE QL NAA+PROBE: SIGNIFICANT CHANGE UP
SODIUM SERPL-SCNC: 137 MMOL/L — SIGNIFICANT CHANGE UP (ref 135–145)
SODIUM SERPL-SCNC: 137 MMOL/L — SIGNIFICANT CHANGE UP (ref 135–145)
TACROLIMUS SERPL-MCNC: 7.4 NG/ML — SIGNIFICANT CHANGE UP
TACROLIMUS SERPL-MCNC: 7.4 NG/ML — SIGNIFICANT CHANGE UP
TACROLIMUS SERPL-MCNC: 9 NG/ML — SIGNIFICANT CHANGE UP
TACROLIMUS SERPL-MCNC: 9 NG/ML — SIGNIFICANT CHANGE UP
WBC # BLD: 2.15 K/UL — LOW (ref 3.8–10.5)
WBC # BLD: 2.15 K/UL — LOW (ref 3.8–10.5)
WBC # FLD AUTO: 2.15 K/UL — LOW (ref 3.8–10.5)
WBC # FLD AUTO: 2.15 K/UL — LOW (ref 3.8–10.5)

## 2023-12-07 PROCEDURE — 99232 SBSQ HOSP IP/OBS MODERATE 35: CPT

## 2023-12-07 PROCEDURE — 93282 PRGRMG EVAL IMPLANTABLE DFB: CPT | Mod: 26

## 2023-12-07 PROCEDURE — 93925 LOWER EXTREMITY STUDY: CPT | Mod: 26

## 2023-12-07 PROCEDURE — 71046 X-RAY EXAM CHEST 2 VIEWS: CPT | Mod: 26

## 2023-12-07 PROCEDURE — 93923 UPR/LXTR ART STDY 3+ LVLS: CPT | Mod: 26

## 2023-12-07 RX ORDER — SODIUM,POTASSIUM PHOSPHATES 278-250MG
2 POWDER IN PACKET (EA) ORAL
Refills: 0 | Status: COMPLETED | OUTPATIENT
Start: 2023-12-07 | End: 2023-12-08

## 2023-12-07 RX ORDER — MAGNESIUM SULFATE 500 MG/ML
2 VIAL (ML) INJECTION ONCE
Refills: 0 | Status: COMPLETED | OUTPATIENT
Start: 2023-12-07 | End: 2023-12-07

## 2023-12-07 RX ORDER — SODIUM,POTASSIUM PHOSPHATES 278-250MG
2 POWDER IN PACKET (EA) ORAL
Refills: 0 | Status: DISCONTINUED | OUTPATIENT
Start: 2023-12-07 | End: 2023-12-07

## 2023-12-07 RX ORDER — CARVEDILOL PHOSPHATE 80 MG/1
3.12 CAPSULE, EXTENDED RELEASE ORAL EVERY 12 HOURS
Refills: 0 | Status: DISCONTINUED | OUTPATIENT
Start: 2023-12-07 | End: 2023-12-11

## 2023-12-07 RX ADMIN — Medication 25 GRAM(S): at 12:47

## 2023-12-07 RX ADMIN — Medication 1 TABLET(S): at 12:49

## 2023-12-07 RX ADMIN — Medication 166.67 MILLIGRAM(S): at 04:30

## 2023-12-07 RX ADMIN — Medication 10 MILLIGRAM(S): at 06:08

## 2023-12-07 RX ADMIN — Medication 166.67 MILLIGRAM(S): at 18:09

## 2023-12-07 RX ADMIN — CARVEDILOL PHOSPHATE 3.12 MILLIGRAM(S): 80 CAPSULE, EXTENDED RELEASE ORAL at 06:07

## 2023-12-07 RX ADMIN — ATORVASTATIN CALCIUM 40 MILLIGRAM(S): 80 TABLET, FILM COATED ORAL at 21:33

## 2023-12-07 RX ADMIN — Medication 10 UNIT(S): at 12:48

## 2023-12-07 RX ADMIN — Medication 2 TABLET(S): at 18:09

## 2023-12-07 RX ADMIN — INSULIN GLARGINE 17 UNIT(S): 100 INJECTION, SOLUTION SUBCUTANEOUS at 21:33

## 2023-12-07 RX ADMIN — CARVEDILOL PHOSPHATE 3.12 MILLIGRAM(S): 80 CAPSULE, EXTENDED RELEASE ORAL at 18:09

## 2023-12-07 RX ADMIN — Medication 81 MILLIGRAM(S): at 12:48

## 2023-12-07 RX ADMIN — TACROLIMUS 5 MILLIGRAM(S): 5 CAPSULE ORAL at 08:00

## 2023-12-07 RX ADMIN — PIPERACILLIN AND TAZOBACTAM 25 GRAM(S): 4; .5 INJECTION, POWDER, LYOPHILIZED, FOR SOLUTION INTRAVENOUS at 13:55

## 2023-12-07 RX ADMIN — PIPERACILLIN AND TAZOBACTAM 25 GRAM(S): 4; .5 INJECTION, POWDER, LYOPHILIZED, FOR SOLUTION INTRAVENOUS at 06:08

## 2023-12-07 RX ADMIN — APIXABAN 5 MILLIGRAM(S): 2.5 TABLET, FILM COATED ORAL at 21:33

## 2023-12-07 RX ADMIN — Medication 10 UNIT(S): at 18:09

## 2023-12-07 RX ADMIN — PIPERACILLIN AND TAZOBACTAM 25 GRAM(S): 4; .5 INJECTION, POWDER, LYOPHILIZED, FOR SOLUTION INTRAVENOUS at 21:34

## 2023-12-07 RX ADMIN — Medication 2: at 12:48

## 2023-12-07 RX ADMIN — FAMOTIDINE 20 MILLIGRAM(S): 10 INJECTION INTRAVENOUS at 12:48

## 2023-12-07 RX ADMIN — APIXABAN 5 MILLIGRAM(S): 2.5 TABLET, FILM COATED ORAL at 07:59

## 2023-12-07 NOTE — DIETITIAN INITIAL EVALUATION ADULT - EDUCATION DIETARY MODIFICATIONS
Reviewed post-transplant nutrition therapy and food safety guidelines for transplant recipients. Reviewed recommendations to avoid grapefruit, pomegranate and star fruit while taking immunosuppressant medication. Reviewed recommendations for moderate intake of sodium and carbohydrates with transplant medications. Pt was receptive and expressed understanding./(2) meets goals/outcomes/verbalization

## 2023-12-07 NOTE — CONSULT NOTE ADULT - ASSESSMENT
CAD s/p cabg  stable   asymptomatic   asa, statin    chronic systolic chf  stable  improved LV function   cont Coreg  off ace/arb/arni for now   s/p ICD    ESRD  s/p transplant   fu with transplant team     Pre-Operative Cardiac Risk Stratification and Optimization   Based on patient history and physical exam, the patient is considered to have elevated risk   recent echo shows normal LV function   stress test last year unremarkable  no CV symptoms  stable from CV standpoints to proceed to OR

## 2023-12-07 NOTE — PROVIDER CONTACT NOTE (OTHER) - RECOMMENDATIONS
have to notify for significant difference in blood pressure. Does provider want any further management.

## 2023-12-07 NOTE — DIETITIAN INITIAL EVALUATION ADULT - REASON
no overt signs of muscle/fat depletion upon visual observation; nutrition focus physical examination not indicated at this time.

## 2023-12-07 NOTE — PROCEDURE NOTE - ADDITIONAL PROCEDURE DETAILS
1. Battery 5.7 years  2. Lead impedance WNL  3. Sensing and pacing thresholds WNL  4. Vpaced 0.1%; in NSR  5. No arrhythmia episodes stored  6. Normal ICD function    #06405 1. Battery 5.7 years  2. Lead impedance WNL  3. Sensing and pacing thresholds WNL  4. Vpaced 0.1%; in NSR  5. No arrhythmia episodes stored  6. Normal ICD function    #[id number]

## 2023-12-07 NOTE — PROVIDER CONTACT NOTE (OTHER) - ASSESSMENT
patient had inaccurate blood pressure reading. Patients pressure was taken on ligated fistula arm left upper extremity. When taking manuals on that extremity pulse was barely audible, when rechecked on right lower arm pressure was more within patients baseline. Patient was asymptomatic, denies pain or discomfort, all other vitals stable.

## 2023-12-07 NOTE — PROGRESS NOTE ADULT - SUBJECTIVE AND OBJECTIVE BOX
Vascular Surgery Progress Note  Patient is a 53y old  Male who presents with a chief complaint of c/f osteomyelitis (07 Dec 2023 08:15)      INTERVAL EVENTS: No acute events overnight.  SUBJECTIVE: Patient seen and examined at bedside with surgical team, patient without complaints. Denies fever, chills, CP, SOB nausea, vomiting, abdominal pain.        OBJECTIVE:    Vital Signs Last 24 Hrs  T(C): 36.7 (07 Dec 2023 05:36), Max: 36.8 (07 Dec 2023 01:38)  T(F): 98.1 (07 Dec 2023 05:36), Max: 98.3 (07 Dec 2023 01:38)  HR: 76 (07 Dec 2023 05:36) (74 - 78)  BP: 136/59 (07 Dec 2023 05:36) (136/59 - 187/76)  BP(mean): 78 (06 Dec 2023 17:20) (78 - 83)  RR: 18 (07 Dec 2023 05:36) (16 - 19)  SpO2: 98% (07 Dec 2023 05:36) (97% - 100%)    Parameters below as of 07 Dec 2023 05:36  Patient On (Oxygen Delivery Method): room air    I&O's Detail    06 Dec 2023 07:01  -  07 Dec 2023 07:00  --------------------------------------------------------  IN:  Total IN: 0 mL    OUT:    Voided (mL): 800 mL  Total OUT: 800 mL    Total NET: -800 mL      07 Dec 2023 07:01  -  07 Dec 2023 12:26  --------------------------------------------------------  IN:    Oral Fluid: 240 mL  Total IN: 240 mL    OUT:    Voided (mL): 250 mL  Total OUT: 250 mL    Total NET: -10 mL      MEDICATIONS  (STANDING):  apixaban 5 milliGRAM(s) Oral every 12 hours  aspirin enteric coated 81 milliGRAM(s) Oral daily  atorvastatin 40 milliGRAM(s) Oral at bedtime  carvedilol 3.125 milliGRAM(s) Oral every 12 hours  famotidine    Tablet 20 milliGRAM(s) Oral daily  insulin glargine Injectable (LANTUS) 17 Unit(s) SubCutaneous at bedtime  insulin lispro (ADMELOG) corrective regimen sliding scale   SubCutaneous at bedtime  insulin lispro (ADMELOG) corrective regimen sliding scale   SubCutaneous three times a day before meals  insulin lispro Injectable (ADMELOG) 10 Unit(s) SubCutaneous three times a day with meals  magnesium sulfate  IVPB 2 Gram(s) IV Intermittent once  piperacillin/tazobactam IVPB.. 3.375 Gram(s) IV Intermittent every 8 hours  potassium phosphate / sodium phosphate Tablet (K-PHOS No. 2) 2 Tablet(s) Oral two times a day  predniSONE   Tablet 10 milliGRAM(s) Oral daily  tacrolimus ER Tablet (ENVARSUS XR) 5 milliGRAM(s) Oral <User Schedule>  trimethoprim   80 mG/sulfamethoxazole 400 mG 1 Tablet(s) Oral daily  vancomycin  IVPB 1250 milliGRAM(s) IV Intermittent every 12 hours    MEDICATIONS  (PRN):      PHYSICAL EXAM:    Gen: NAD  Neuro: A&Ox3  Resp: no increased WOB, satting well on RA  Cardiac: appears well perfused, extremities warm  Extremities  -LLE: 1st metatarsal ulcer, +DP/PT signals, warm, motor/ sensory intact   -RLE: 3rd toe wound with dry gangrene, +DP/ PT signals, warm, motor/ sensory intact     LABS:                        11.0   2.15  )-----------( 169      ( 07 Dec 2023 07:01 )             35.4     12-07    137  |  106  |  21  ----------------------------<  144<H>  4.5   |  22  |  1.07    Ca    10.2      07 Dec 2023 06:59  Phos  2.1     12-07  Mg     1.7     12-07    TPro  6.3  /  Alb  3.7  /  TBili  0.3  /  DBili  x   /  AST  11  /  ALT  10  /  AlkPhos  97  12-06      LIVER FUNCTIONS - ( 06 Dec 2023 13:21 )  Alb: 3.7 g/dL / Pro: 6.3 g/dL / ALK PHOS: 97 U/L / ALT: 10 U/L / AST: 11 U/L / GGT: x           Urinalysis Basic - ( 07 Dec 2023 06:59 )    Color: x / Appearance: x / SG: x / pH: x  Gluc: 144 mg/dL / Ketone: x  / Bili: x / Urobili: x   Blood: x / Protein: x / Nitrite: x   Leuk Esterase: x / RBC: x / WBC x   Sq Epi: x / Non Sq Epi: x / Bacteria: x      ABO Interpretation: A (12-06-23 @ 13:40)      IMAGING:     Vascular Surgery Progress Note  Patient is a 53y old  Male who presents with a chief complaint of c/f osteomyelitis (07 Dec 2023 08:15)      INTERVAL EVENTS: No acute events overnight.  SUBJECTIVE: Patient seen and examined at bedside with surgical team, patient without complaints. Denies fever, chills, CP, SOB nausea, vomiting, abdominal pain.        OBJECTIVE:    Vital Signs Last 24 Hrs  T(C): 36.7 (07 Dec 2023 05:36), Max: 36.8 (07 Dec 2023 01:38)  T(F): 98.1 (07 Dec 2023 05:36), Max: 98.3 (07 Dec 2023 01:38)  HR: 76 (07 Dec 2023 05:36) (74 - 78)  BP: 136/59 (07 Dec 2023 05:36) (136/59 - 187/76)  BP(mean): 78 (06 Dec 2023 17:20) (78 - 83)  RR: 18 (07 Dec 2023 05:36) (16 - 19)  SpO2: 98% (07 Dec 2023 05:36) (97% - 100%)    Parameters below as of 07 Dec 2023 05:36  Patient On (Oxygen Delivery Method): room air    I&O's Detail    06 Dec 2023 07:01  -  07 Dec 2023 07:00  --------------------------------------------------------  IN:  Total IN: 0 mL    OUT:    Voided (mL): 800 mL  Total OUT: 800 mL    Total NET: -800 mL      07 Dec 2023 07:01  -  07 Dec 2023 12:26  --------------------------------------------------------  IN:    Oral Fluid: 240 mL  Total IN: 240 mL    OUT:    Voided (mL): 250 mL  Total OUT: 250 mL    Total NET: -10 mL      MEDICATIONS  (STANDING):  apixaban 5 milliGRAM(s) Oral every 12 hours  aspirin enteric coated 81 milliGRAM(s) Oral daily  atorvastatin 40 milliGRAM(s) Oral at bedtime  carvedilol 3.125 milliGRAM(s) Oral every 12 hours  famotidine    Tablet 20 milliGRAM(s) Oral daily  insulin glargine Injectable (LANTUS) 17 Unit(s) SubCutaneous at bedtime  insulin lispro (ADMELOG) corrective regimen sliding scale   SubCutaneous at bedtime  insulin lispro (ADMELOG) corrective regimen sliding scale   SubCutaneous three times a day before meals  insulin lispro Injectable (ADMELOG) 10 Unit(s) SubCutaneous three times a day with meals  magnesium sulfate  IVPB 2 Gram(s) IV Intermittent once  piperacillin/tazobactam IVPB.. 3.375 Gram(s) IV Intermittent every 8 hours  potassium phosphate / sodium phosphate Tablet (K-PHOS No. 2) 2 Tablet(s) Oral two times a day  predniSONE   Tablet 10 milliGRAM(s) Oral daily  tacrolimus ER Tablet (ENVARSUS XR) 5 milliGRAM(s) Oral <User Schedule>  trimethoprim   80 mG/sulfamethoxazole 400 mG 1 Tablet(s) Oral daily  vancomycin  IVPB 1250 milliGRAM(s) IV Intermittent every 12 hours    MEDICATIONS  (PRN):      PHYSICAL EXAM:    Gen: NAD  Neuro: A&Ox3  Resp: no increased WOB, satting well on RA  Cardiac: appears well perfused, extremities warm  Extremities  -LLE: 1st metatarsal ulcer, +DP/PT signals, warm, motor/ sensory intact   -RLE: 3rd toe wound with dry gangrene, +DP/ PT signals, warm, motor/ sensory intact     LABS:                        11.0   2.15  )-----------( 169      ( 07 Dec 2023 07:01 )             35.4     12-07    137  |  106  |  21  ----------------------------<  144<H>  4.5   |  22  |  1.07    Ca    10.2      07 Dec 2023 06:59  Phos  2.1     12-07  Mg     1.7     12-07    TPro  6.3  /  Alb  3.7  /  TBili  0.3  /  DBili  x   /  AST  11  /  ALT  10  /  AlkPhos  97  12-06      LIVER FUNCTIONS - ( 06 Dec 2023 13:21 )  Alb: 3.7 g/dL / Pro: 6.3 g/dL / ALK PHOS: 97 U/L / ALT: 10 U/L / AST: 11 U/L / GGT: x           Urinalysis Basic - ( 07 Dec 2023 06:59 )    Color: x / Appearance: x / SG: x / pH: x  Gluc: 144 mg/dL / Ketone: x  / Bili: x / Urobili: x   Blood: x / Protein: x / Nitrite: x   Leuk Esterase: x / RBC: x / WBC x   Sq Epi: x / Non Sq Epi: x / Bacteria: x      ABO Interpretation: A (12-06-23 @ 13:40)      JAN/PVR reviewed

## 2023-12-07 NOTE — PROVIDER CONTACT NOTE (CRITICAL VALUE NOTIFICATION) - SITUATION
L Foot abcess  Numerous Klebsiella pneumoniae  Numerous Pseudomonas aeruginosa  Numerous Enterococcus faecalis

## 2023-12-07 NOTE — DIETITIAN INITIAL EVALUATION ADULT - REASON FOR ADMISSION
Chart Reviewed, Events Noted  "Patient is a 53y old  Male who presents with a chief complaint of c/f osteomyelitis."

## 2023-12-07 NOTE — PROGRESS NOTE ADULT - ASSESSMENT
54 y/o M with past medical history significant for IDDM, CAD s/p CABG x4v (2015) s/p AICD (2016), PVD (4 stents in RLE), HTN, HLD and ESRD previously on HD via LUE AVF (ligated 9/8/23) now s/p DDRT 7/16/2023 with Simulect -> Thymoglobulin induction. His post-transplant course was complicated by DGF and subtly AMR with +DSAs s/p PLEX/IVIG (8/2023) and L subclavian thrombus remains on Eliquis presents to [facility] ED from wound care clinic on 12/6/23 after concern for osteomyelitis from clinic providers.     [ ] c/f bilateral foot osteomyelitis  - Vascular, f/u JAN/PVR and podiatry consulted reccs  - XR concerning for osteomyelitis of both feet  - Transplant ID consulted  - f/u MRI R/L foot ordered   - cont Zosyn/Vanco for empiric coverage    - Follow up blood cultures sent in ED   - Hx PVD: May need perfusion studies, though has palpable pulses     [ ] s/p DDRT 7/16/2023   - Baseline Cr ~1.0   - Immuno: Env 5, MMF HELD d/t c/f for osteomyelitis , Pred 10   - PPx: Bactrim, Pepcid   - Leukopenia: Daily CBC w/ diff, holding Valcyte, CMV w/ AM labs     [ ] IDDM  - Continue Lantus/Lispro home dose   - A1C w/ AM labs     [ ] CHF, CAD s/p CABG, L SC DVT   - Continue Eliquis, ASA 81   - Continue Coreg, Lipitor       plan d/w transplant surgery team   52 y/o M with past medical history significant for IDDM, CAD s/p CABG x4v (2015) s/p AICD (2016), PVD (4 stents in RLE), HTN, HLD and ESRD previously on HD via LUE AVF (ligated 9/8/23) now s/p DDRT 7/16/2023 with Simulect -> Thymoglobulin induction. His post-transplant course was complicated by DGF and subtly AMR with +DSAs s/p PLEX/IVIG (8/2023) and L subclavian thrombus remains on Eliquis presents to [facility] ED from wound care clinic on 12/6/23 after concern for osteomyelitis from clinic providers.     [ ] c/f bilateral foot osteomyelitis  - Vascular, f/u JAN/PVR and podiatry consulted reccs  - XR concerning for osteomyelitis of both feet  - Transplant ID consulted  - f/u MRI R/L foot ordered   - cont Zosyn/Vanco for empiric coverage    - Follow up blood cultures sent in ED   - Hx PVD: May need perfusion studies, though has palpable pulses     [ ] s/p DDRT 7/16/2023   - Baseline Cr ~1.0   - Immuno: Env 5, MMF HELD d/t c/f for osteomyelitis , Pred 10   - PPx: Bactrim, Pepcid   - Leukopenia: Daily CBC w/ diff, holding Valcyte, CMV w/ AM labs     [ ] IDDM  - Continue Lantus/Lispro home dose   - A1C w/ AM labs     [ ] CHF, CAD s/p CABG, L SC DVT   - Continue Eliquis, ASA 81   - Continue Coreg, Lipitor       plan d/w transplant surgery team   54 y/o M with past medical history significant for IDDM, CAD s/p CABG x4v (2015) s/p AICD (2016), PVD (4 stents in RLE), HTN, HLD and ESRD previously on HD via LUE AVF (ligated 9/8/23) now s/p DDRT 7/16/2023 with Simulect -> Thymoglobulin induction. His post-transplant course was complicated by DGF and subtly AMR with +DSAs s/p PLEX/IVIG (8/2023) and L subclavian thrombus remains on Eliquis presents to [facility] ED from wound care clinic on 12/6/23 after concern for osteomyelitis from clinic providers.     [ ] c/f bilateral foot osteomyelitis  - Vascular, f/u JAN/PVR and podiatry consulted reccs  - XR concerning for osteomyelitis of both feet  - Transplant ID consulted  - f/u MRI R/L foot ordered   - cont Zosyn/Vanco for empiric coverage    - Follow up blood cultures sent in ED   - Hx PVD: May need perfusion studies, though has palpable pulses     [ ] s/p DDRT 7/16/2023   - Baseline Cr ~1.0   - Immuno: Env 5, MMF HELD d/t c/f for osteomyelitis , Pred 10   - PPx: Bactrim, Pepcid   - Leukopenia: Daily CBC w/ diff, holding Valcyte,   -f/u CMV w/ AM labs     [ ] IDDM  - Continue Lantus/Lispro home dose   - A1C w/ AM labs     [ ] CHF, CAD s/p CABG, L SC DVT   - Continue Eliquis, ASA 81   - Continue Coreg, Lipitor       plan d/w transplant surgery team   52 y/o M with past medical history significant for IDDM, CAD s/p CABG x4v (2015) s/p AICD (2016), PVD (4 stents in RLE), HTN, HLD and ESRD previously on HD via LUE AVF (ligated 9/8/23) now s/p DDRT 7/16/2023 with Simulect -> Thymoglobulin induction. His post-transplant course was complicated by DGF and subtly AMR with +DSAs s/p PLEX/IVIG (8/2023) and L subclavian thrombus remains on Eliquis presents to [facility] ED from wound care clinic on 12/6/23 after concern for osteomyelitis from clinic providers.     [ ] c/f bilateral foot osteomyelitis  - Vascular, f/u JAN/PVR and podiatry consulted reccs  - XR concerning for osteomyelitis of both feet  - Transplant ID consulted  - f/u MRI R/L foot ordered   - cont Zosyn/Vanco for empiric coverage    - Follow up blood cultures sent in ED   - Hx PVD: May need perfusion studies, though has palpable pulses     [ ] s/p DDRT 7/16/2023   - Baseline Cr ~1.0   - Immuno: Env 5, MMF HELD d/t c/f for osteomyelitis , Pred 10   - PPx: Bactrim, Pepcid   - Leukopenia: Daily CBC w/ diff, holding Valcyte,   -f/u CMV w/ AM labs     [ ] IDDM  - Continue Lantus/Lispro home dose   - A1C w/ AM labs     [ ] CHF, CAD s/p CABG, L SC DVT   - Continue Eliquis, ASA 81   - Continue Coreg, Lipitor       plan d/w transplant surgery team

## 2023-12-07 NOTE — PROGRESS NOTE ADULT - ASSESSMENT
ASSESSMENT: 52 y/o M with PMH of HTN, CHF (s/p AICD 2016), CAD (s/p CABG 2015), IDDM, PVD s/p stent x4 in RLE with R big toe/second toe amputation (2021) and ESRD on HD via LUE AVF for 6 years now s/p DDRT 7/16/2023 presenting to the ED with infected R 3rd toe and left 1st metatarsal ulcers. Patient follows with Dr. Malone of Vascular Surgery. Patient states he has had these wounds for a while, follows with Podiatry. However, states that over the past few weeks, they have begun to look worse, specifically the left 1st metatarsal ulcer. Vascular surgery consulted for evaluation:    RECS:  -No acute intervention  -Recommend JAN/ PVRs - noted  -Podiatry following, appreciate recs   -Please obtain medical and cardiac clearance   - d/w renal attending  pt may be cleared for mra instead of   angio w iv contrast   - will follow         Vascular Surgery, [id number]  ASSESSMENT: 54 y/o M with PMH of HTN, CHF (s/p AICD 2016), CAD (s/p CABG 2015), IDDM, PVD s/p stent x4 in RLE with R big toe/second toe amputation (2021) and ESRD on HD via LUE AVF for 6 years now s/p DDRT 7/16/2023 presenting to the ED with infected R 3rd toe and left 1st metatarsal ulcers. Patient follows with Dr. Malone of Vascular Surgery. Patient states he has had these wounds for a while, follows with Podiatry. However, states that over the past few weeks, they have begun to look worse, specifically the left 1st metatarsal ulcer. Vascular surgery consulted for evaluation:    RECS:  -No acute intervention  -Recommend JAN/ PVRs - noted  -Podiatry following, appreciate recs   -Please obtain medical and cardiac clearance   - d/w renal attending  pt may be cleared for mra instead of   angio w iv contrast   - will follow         Vascular Surgery, [id number]  ASSESSMENT: 54 y/o M with PMH of HTN, CHF (s/p AICD 2016), CAD (s/p CABG 2015), IDDM, PVD s/p stent x4 in RLE with R big toe/second toe amputation (2021) and ESRD on HD via LUE AVF for 6 years now s/p DDRT 7/16/2023 presenting to the ED with infected R 3rd toe and left 1st metatarsal ulcers. Patient follows with Dr. Malone of Vascular Surgery. Patient states he has had these wounds for a while, follows with Podiatry. However, states that over the past few weeks, they have begun to look worse, specifically the left 1st metatarsal ulcer. Vascular surgery consulted for evaluation:    RECS:  -reviewed w pt  Yovana/pvr findings  sig for small vessel dz  pt is cleared for rt toe podiatric intervention  pt is aware that there still is a possibility of limited healing   - d/w renal attending  pt may be cleared for mra instead of   angio w iv contrast   - will follow         Vascular Surgery, [id number]  ASSESSMENT: 54 y/o M with PMH of HTN, CHF (s/p AICD 2016), CAD (s/p CABG 2015), IDDM, PVD s/p stent x4 in RLE with R big toe/second toe amputation (2021) and ESRD on HD via LUE AVF for 6 years now s/p DDRT 7/16/2023 presenting to the ED with infected R 3rd toe and left 1st metatarsal ulcers. Patient follows with Dr. Malone of Vascular Surgery. Patient states he has had these wounds for a while, follows with Podiatry. However, states that over the past few weeks, they have begun to look worse, specifically the left 1st metatarsal ulcer. Vascular surgery consulted for evaluation:    RECS:  -reviewed w pt  Yovana/pvr findings  sig for small vessel dz  pt is cleared for rt toe podiatric intervention  pt is aware that there still is a possibility of limited healing   - d/w renal attending  pt may be cleared for mra instead of   angio w iv contrast   - will follow         Vascular Surgery, [id number]

## 2023-12-07 NOTE — DIETITIAN INITIAL EVALUATION ADULT - NSFNSPHYEXAMSKINFT_GEN_A_CORE
-- No pressure injuries noted per flow sheets; noted with diabetic food ulcer to left lateral foot and left 2nd toe.

## 2023-12-07 NOTE — CONSULT NOTE ADULT - SUBJECTIVE AND OBJECTIVE BOX
CHIEF COMPLAINT:Patient is a 53y old  Male who presents with a chief complaint of c/f osteomyelitis (06 Dec 2023 22:08)      HISTORY OF PRESENT ILLNESS:    This is a pleasant gentlemanwith past medical history significant for IDDM, CAD s/p CABG x4v (2015) s/p AICD (2016), PVD (4 stents in RLE), HTN, HLD and ESRD previously on HD via LUE AVF (ligated 9/8/23) now s/p DDRT 7/16/2023 with Simulect -> Thymoglobulin induction. His post-transplant course was complicated by DGF and subtly AMR with +DSAs s/p PLEX/IVIG (8/2023) and L subclavian thrombus remains on Eliquis presents to [facility] ED from wound care clinic on 12/6/23 after concern for osteomyelitis may need podiatry surgery   denies any chest pain, sob, palpitation, dizziness or syncope.      PAST MEDICAL & SURGICAL HISTORY:  Diabetes mellitus  type 2      Foot ulcer due to secondary DM      Coronary artery disease      Acute on chronic systolic heart failure      H/O kidney transplant      Breast Reduction  at age 18      Toe amputation status, left      S/P CABG (coronary artery bypass graft)      AICD (automatic cardioverter/defibrillator) present              MEDICATIONS:  apixaban 5 milliGRAM(s) Oral every 12 hours  aspirin enteric coated 81 milliGRAM(s) Oral daily  carvedilol 3.125 milliGRAM(s) Oral every 12 hours    piperacillin/tazobactam IVPB.. 3.375 Gram(s) IV Intermittent every 8 hours  trimethoprim   80 mG/sulfamethoxazole 400 mG 1 Tablet(s) Oral daily  vancomycin  IVPB 1250 milliGRAM(s) IV Intermittent every 12 hours        famotidine    Tablet 20 milliGRAM(s) Oral daily    atorvastatin 40 milliGRAM(s) Oral at bedtime  insulin glargine Injectable (LANTUS) 17 Unit(s) SubCutaneous at bedtime  insulin lispro (ADMELOG) corrective regimen sliding scale   SubCutaneous three times a day before meals  insulin lispro (ADMELOG) corrective regimen sliding scale   SubCutaneous at bedtime  insulin lispro Injectable (ADMELOG) 10 Unit(s) SubCutaneous three times a day with meals  predniSONE   Tablet 10 milliGRAM(s) Oral daily    tacrolimus ER Tablet (ENVARSUS XR) 5 milliGRAM(s) Oral <User Schedule>      FAMILY HISTORY:  Family history of diabetes mellitus (Father)        Non-contributory    SOCIAL HISTORY:    No tobacco, drugs or etoh    Allergies    Contrast. (Unknown)    Intolerances    	    REVIEW OF SYSTEMS:  as above  The rest of the 14 points ROS reviewed and except above they are unremarkable.        PHYSICAL EXAM:  T(C): 36.7 (12-07-23 @ 05:36), Max: 36.8 (12-07-23 @ 01:38)  HR: 76 (12-07-23 @ 05:36) (74 - 80)  BP: 136/59 (12-07-23 @ 05:36) (136/59 - 187/76)  RR: 18 (12-07-23 @ 05:36) (16 - 20)  SpO2: 98% (12-07-23 @ 05:36) (97% - 100%)  Wt(kg): --  I&O's Summary    06 Dec 2023 07:01  -  07 Dec 2023 07:00  --------------------------------------------------------  IN: 0 mL / OUT: 800 mL / NET: -800 mL      JVP: Normal  Neck: supple  Lung: clear   CV: S1 S2 , Murmur:  Abd: soft  Ext: No edema  neuro: Awake / alert  Psych: flat affect  Skin: normal    LABS/DATA:    TELEMETRY: 	    ECG:  	   	  CARDIAC MARKERS:        < from: TTE Limited W or WO Ultrasound Enhancing Agent (07.16.23 @ 12:02) >  __  CONCLUSIONS:      1. Patient reportedly refused administration of an ultrasound enhancing agent.   2. Normal left ventricular cavity size. The left ventricular wall thickness is normal. There are no regional wall motion abnormalities seen.   3. Normal right ventricular cavity size and normal right ventricular systolic function.   4. Device lead is visualized in the right heart.    ________________________________________________________________________    < end of copied text >                                11.0   2.15  )-----------( 169      ( 07 Dec 2023 07:01 )             35.4     12-07    137  |  106  |  21  ----------------------------<  144<H>  4.5   |  22  |  1.07    Ca    10.2      07 Dec 2023 06:59  Phos  2.1     12-07  Mg     1.7     12-07    TPro  6.3  /  Alb  3.7  /  TBili  0.3  /  DBili  x   /  AST  11  /  ALT  10  /  AlkPhos  97  12-06    proBNP:   Lipid Profile:   HgA1c:   TSH:            CHIEF COMPLAINT:Patient is a 53y old  Male who presents with a chief complaint of c/f osteomyelitis (06 Dec 2023 22:08)      HISTORY OF PRESENT ILLNESS:    This is a pleasant gentlemanwith past medical history significant for IDDM, CAD s/p CABG x4v (2015) s/p AICD (2016), PVD (4 stents in RLE), HTN, HLD and ESRD previously on HD via LUE AVF (ligated 9/8/23) now s/p DDRT 7/16/2023 with Simulect -> Thymoglobulin induction. His post-transplant course was complicated by DGF and subtly AMR with +DSAs s/p PLEX/IVIG (8/2023) and L subclavian thrombus remains on Eliquis presents to [facility] ED from wound care clinic on 12/6/23 after concern for osteomyelitis may need podiatry surgery   denies any chest pain, sob, palpitation, dizziness or syncope.      PAST MEDICAL & SURGICAL HISTORY:  Diabetes mellitus  type 2      Foot ulcer due to secondary DM      Coronary artery disease      Acute on chronic systolic heart failure      H/O kidney transplant      Breast Reduction  at age 18      Toe amputation status, left      S/P CABG (coronary artery bypass graft)      AICD (automatic cardioverter/defibrillator) present              MEDICATIONS:  apixaban 5 milliGRAM(s) Oral every 12 hours  aspirin enteric coated 81 milliGRAM(s) Oral daily  carvedilol 3.125 milliGRAM(s) Oral every 12 hours    piperacillin/tazobactam IVPB.. 3.375 Gram(s) IV Intermittent every 8 hours  trimethoprim   80 mG/sulfamethoxazole 400 mG 1 Tablet(s) Oral daily  vancomycin  IVPB 1250 milliGRAM(s) IV Intermittent every 12 hours        famotidine    Tablet 20 milliGRAM(s) Oral daily    atorvastatin 40 milliGRAM(s) Oral at bedtime  insulin glargine Injectable (LANTUS) 17 Unit(s) SubCutaneous at bedtime  insulin lispro (ADMELOG) corrective regimen sliding scale   SubCutaneous three times a day before meals  insulin lispro (ADMELOG) corrective regimen sliding scale   SubCutaneous at bedtime  insulin lispro Injectable (ADMELOG) 10 Unit(s) SubCutaneous three times a day with meals  predniSONE   Tablet 10 milliGRAM(s) Oral daily    tacrolimus ER Tablet (ENVARSUS XR) 5 milliGRAM(s) Oral <User Schedule>      FAMILY HISTORY:  Family history of diabetes mellitus (Father)        Non-contributory    SOCIAL HISTORY:    No tobacco, drugs or etoh    Allergies    Contrast. (Unknown)    Intolerances    	    REVIEW OF SYSTEMS:  as above  The rest of the 14 points ROS reviewed and except above they are unremarkable.        PHYSICAL EXAM:  T(C): 36.7 (12-07-23 @ 05:36), Max: 36.8 (12-07-23 @ 01:38)  HR: 76 (12-07-23 @ 05:36) (74 - 80)  BP: 136/59 (12-07-23 @ 05:36) (136/59 - 187/76)  RR: 18 (12-07-23 @ 05:36) (16 - 20)  SpO2: 98% (12-07-23 @ 05:36) (97% - 100%)  Wt(kg): --  I&O's Summary    06 Dec 2023 07:01  -  07 Dec 2023 07:00  --------------------------------------------------------  IN: 0 mL / OUT: 800 mL / NET: -800 mL      JVP: Normal  Neck: supple  Lung: clear   CV: S1 S2 , Murmur:  Abd: soft  Ext: No edema  neuro: Awake / alert  Psych: flat affect  Skin: normal    LABS/DATA:    TELEMETRY: 	    ECG:  	   	  CARDIAC MARKERS:        < from: TTE Limited W or WO Ultrasound Enhancing Agent (07.16.23 @ 12:02) >  __  CONCLUSIONS:      1. Patient reportedly refused administration of an ultrasound enhancing agent.   2. Normal left ventricular cavity size. The left ventricular wall thickness is normal. There are no regional wall motion abnormalities seen.   3. Normal right ventricular cavity size and normal right ventricular systolic function.   4. Device lead is visualized in the right heart.    ________________________________________________________________________    < end of copied text >                                11.0   2.15  )-----------( 169      ( 07 Dec 2023 07:01 )             35.4     12-07    137  |  106  |  21  ----------------------------<  144<H>  4.5   |  22  |  1.07    Ca    10.2      07 Dec 2023 06:59  Phos  2.1     12-07  Mg     1.7     12-07    TPro  6.3  /  Alb  3.7  /  TBili  0.3  /  DBili  x   /  AST  11  /  ALT  10  /  AlkPhos  97  12-06    proBNP:   Lipid Profile:   HgA1c:   TSH:

## 2023-12-07 NOTE — PROGRESS NOTE ADULT - SUBJECTIVE AND OBJECTIVE BOX
Transplant Surgery - Multidisciplinary Progress Note  --------------------------------------------------------------  DDRT 7/16/2023    Present: Patient seen and examined with multidisciplinary team including transplant surgeon: Dr. Simons, Nephrologist Dr. Hairston, pharmacist Jorge Alberto Plunkett, SHERIE Crawford, and unit RN during AM rounds. Disciplines not in attendance will be notified of the plan.      HPI: Tee Salvador is a 52 y/o M with past medical history significant for HTN, CHF (s/p AICD 2016--last interrogated 7/15/23), CAD (s/p CABG 2015), IDDM, PVD s/p stent x4 in RLE with R big toe/second toe amputation (2021) and ESRD on HD via LUE AVF for 6 years now s/p DDRT 7/16/2023 with Thymoglobulin induction. Post-transplant course complicated by severe constipation requiring disimpaction, DGF requiring hemodialysis and LUE swelling with concern for L subclavian thrombus on ELiquius, now s/p IR fistulogram 7/7/23 with balloon angioplasty of subclavian vein. He was discharged home on 7/27/23  presents to [facility] ED from wound care clinic on 12/6/23 after concern for osteomyelitis from clinic providers.     Interval Events:  - pending MRI of LE  -pending Podiatry and vascular intervention  -f/u CMV     Immunosuppression:  Envarsus 5 mg  MMF held  Prednisone 10 mg daily    Potential Discharge date: TBD      MEDICATIONS  (STANDING):  apixaban 5 milliGRAM(s) Oral every 12 hours  aspirin enteric coated 81 milliGRAM(s) Oral daily  atorvastatin 40 milliGRAM(s) Oral at bedtime  carvedilol 3.125 milliGRAM(s) Oral every 12 hours  famotidine    Tablet 20 milliGRAM(s) Oral daily  insulin glargine Injectable (LANTUS) 17 Unit(s) SubCutaneous at bedtime  insulin lispro (ADMELOG) corrective regimen sliding scale   SubCutaneous at bedtime  insulin lispro (ADMELOG) corrective regimen sliding scale   SubCutaneous three times a day before meals  insulin lispro Injectable (ADMELOG) 10 Unit(s) SubCutaneous three times a day with meals  piperacillin/tazobactam IVPB.. 3.375 Gram(s) IV Intermittent every 8 hours  potassium phosphate / sodium phosphate Tablet (K-PHOS No. 2) 2 Tablet(s) Oral two times a day  predniSONE   Tablet 10 milliGRAM(s) Oral daily  tacrolimus ER Tablet (ENVARSUS XR) 5 milliGRAM(s) Oral <User Schedule>  trimethoprim   80 mG/sulfamethoxazole 400 mG 1 Tablet(s) Oral daily  vancomycin  IVPB 1250 milliGRAM(s) IV Intermittent every 12 hours    MEDICATIONS  (PRN):      PAST MEDICAL & SURGICAL HISTORY:  Diabetes mellitus  type 2      Foot ulcer due to secondary DM      Coronary artery disease      Acute on chronic systolic heart failure      H/O kidney transplant      Breast Reduction  at age 18      Toe amputation status, left      S/P CABG (coronary artery bypass graft)      AICD (automatic cardioverter/defibrillator) present          Vital Signs Last 24 Hrs  T(C): 36.7 (07 Dec 2023 05:36), Max: 36.8 (07 Dec 2023 01:38)  T(F): 98.1 (07 Dec 2023 05:36), Max: 98.3 (07 Dec 2023 01:38)  HR: 76 (07 Dec 2023 05:36) (74 - 78)  BP: 136/59 (07 Dec 2023 05:36) (136/59 - 187/76)  BP(mean): 78 (06 Dec 2023 17:20) (78 - 78)  RR: 18 (07 Dec 2023 05:36) (16 - 19)  SpO2: 98% (07 Dec 2023 05:36) (97% - 100%)    Parameters below as of 07 Dec 2023 05:36  Patient On (Oxygen Delivery Method): room air        I&O's Summary    06 Dec 2023 07:01  -  07 Dec 2023 07:00  --------------------------------------------------------  IN: 0 mL / OUT: 800 mL / NET: -800 mL    07 Dec 2023 07:01  -  07 Dec 2023 13:01  --------------------------------------------------------  IN: 240 mL / OUT: 250 mL / NET: -10 mL                              11.0   2.15  )-----------( 169      ( 07 Dec 2023 07:01 )             35.4     12-07    137  |  106  |  21  ----------------------------<  144<H>  4.5   |  22  |  1.07    Ca    10.2      07 Dec 2023 06:59  Phos  2.1     12-07  Mg     1.7     12-07    TPro  6.3  /  Alb  3.7  /  TBili  0.3  /  DBili  x   /  AST  11  /  ALT  10  /  AlkPhos  97  12-06    Tacrolimus (), Serum: 7.4 ng/mL (12-07 @ 07:01)                Review of systems  Gen: No weight changes, fatigue, fevers/chills, weakness  Skin: No rashes  Head/Eyes/Ears/Mouth: No headache; Normal hearing; Normal vision w/o blurriness; No sinus pain/discomfort, sore throat  Respiratory: No dyspnea, cough, wheezing, hemoptysis  CV: No chest pain, PND, orthopnea  GI: no abdominal pain, denies diarrhea, constipation, nausea, vomiting, melena, hematochezia  : No increased frequency, dysuria, hematuria, nocturia  MSK: No joint pain/swelling; no back pain; no edema  Neuro: No dizziness/lightheadedness, weakness, seizures, numbness, tingling  Heme: No easy bruising or bleeding  Endo: No heat/cold intolerance  Psych: No significant nervousness, anxiety, stress, depression  All other systems were reviewed and are negative, except as noted.     PHYSICAL EXAM:  Constitutional: Well developed / well nourished  Eyes: Anicteric, PERRLA  ENMT: nc/at  Neck: supple  Respiratory: CTA B/L  Cardiovascular: RRR  Gastrointestinal: Soft, ND/NT   Genitourinary: Voiding spontaneously  Extremities: SCD's in place and working bilaterally  Vascular: Palpable dp pulses bilaterally, b/l LE foot wound dressing in place  Neurological: A&O x3  Skin: no rashes, ulcerations or lesions;  Musculoskeletal: Moving all extremities  Psychiatric: Responsive Transplant Surgery - Multidisciplinary Progress Note  --------------------------------------------------------------  DDRT 7/16/2023    Present: Patient seen and examined with multidisciplinary team including transplant surgeon: Dr. Simons, Nephrologist Dr. Hairston, pharmacist Jorge Alberto Plunkett, SHERIE Crawford, and unit RN during AM rounds. Disciplines not in attendance will be notified of the plan.      HPI: Tee Salvador is a 52 y/o M with past medical history significant for HTN, CHF (s/p AICD 2016--last interrogated 7/15/23), CAD (s/p CABG 2015), IDDM, PVD s/p stent x4 in RLE with R big toe/second toe amputation (2021) and ESRD on HD via LUE AVF for 6 years now s/p DDRT 7/16/2023 with Thymoglobulin induction. Post-transplant course complicated by severe constipation requiring disimpaction, DGF requiring hemodialysis and LUE swelling with concern for L subclavian thrombus on ELiquius, now s/p IR fistulogram 7/7/23 with balloon angioplasty of subclavian vein. He was discharged home on 7/27/23  presents to [facility] ED from wound care clinic on 12/6/23 after concern for osteomyelitis from clinic providers.     Interval Events:  - pending MRI of LE  -pending Podiatry and vascular intervention  -f/u CMV     Immunosuppression:  Envarsus 5 mg  MMF held  Prednisone 10 mg daily    Potential Discharge date: TBD      MEDICATIONS  (STANDING):  apixaban 5 milliGRAM(s) Oral every 12 hours  aspirin enteric coated 81 milliGRAM(s) Oral daily  atorvastatin 40 milliGRAM(s) Oral at bedtime  carvedilol 3.125 milliGRAM(s) Oral every 12 hours  famotidine    Tablet 20 milliGRAM(s) Oral daily  insulin glargine Injectable (LANTUS) 17 Unit(s) SubCutaneous at bedtime  insulin lispro (ADMELOG) corrective regimen sliding scale   SubCutaneous at bedtime  insulin lispro (ADMELOG) corrective regimen sliding scale   SubCutaneous three times a day before meals  insulin lispro Injectable (ADMELOG) 10 Unit(s) SubCutaneous three times a day with meals  piperacillin/tazobactam IVPB.. 3.375 Gram(s) IV Intermittent every 8 hours  potassium phosphate / sodium phosphate Tablet (K-PHOS No. 2) 2 Tablet(s) Oral two times a day  predniSONE   Tablet 10 milliGRAM(s) Oral daily  tacrolimus ER Tablet (ENVARSUS XR) 5 milliGRAM(s) Oral <User Schedule>  trimethoprim   80 mG/sulfamethoxazole 400 mG 1 Tablet(s) Oral daily  vancomycin  IVPB 1250 milliGRAM(s) IV Intermittent every 12 hours    MEDICATIONS  (PRN):      PAST MEDICAL & SURGICAL HISTORY:  Diabetes mellitus  type 2      Foot ulcer due to secondary DM      Coronary artery disease      Acute on chronic systolic heart failure      H/O kidney transplant      Breast Reduction  at age 18      Toe amputation status, left      S/P CABG (coronary artery bypass graft)      AICD (automatic cardioverter/defibrillator) present          Vital Signs Last 24 Hrs  T(C): 36.7 (07 Dec 2023 05:36), Max: 36.8 (07 Dec 2023 01:38)  T(F): 98.1 (07 Dec 2023 05:36), Max: 98.3 (07 Dec 2023 01:38)  HR: 76 (07 Dec 2023 05:36) (74 - 78)  BP: 136/59 (07 Dec 2023 05:36) (136/59 - 187/76)  BP(mean): 78 (06 Dec 2023 17:20) (78 - 78)  RR: 18 (07 Dec 2023 05:36) (16 - 19)  SpO2: 98% (07 Dec 2023 05:36) (97% - 100%)    Parameters below as of 07 Dec 2023 05:36  Patient On (Oxygen Delivery Method): room air        I&O's Summary    06 Dec 2023 07:01  -  07 Dec 2023 07:00  --------------------------------------------------------  IN: 0 mL / OUT: 800 mL / NET: -800 mL    07 Dec 2023 07:01  -  07 Dec 2023 13:01  --------------------------------------------------------  IN: 240 mL / OUT: 250 mL / NET: -10 mL                              11.0   2.15  )-----------( 169      ( 07 Dec 2023 07:01 )             35.4     12-07    137  |  106  |  21  ----------------------------<  144<H>  4.5   |  22  |  1.07    Ca    10.2      07 Dec 2023 06:59  Phos  2.1     12-07  Mg     1.7     12-07    TPro  6.3  /  Alb  3.7  /  TBili  0.3  /  DBili  x   /  AST  11  /  ALT  10  /  AlkPhos  97  12-06    Tacrolimus (), Serum: 7.4 ng/mL (12-07 @ 07:01)                Review of systems  Gen: No weight changes, fatigue, fevers/chills, weakness  Skin: No rashes  Head/Eyes/Ears/Mouth: No headache; Normal hearing; Normal vision w/o blurriness; No sinus pain/discomfort, sore throat  Respiratory: No dyspnea, cough, wheezing, hemoptysis  CV: No chest pain, PND, orthopnea  GI: no abdominal pain, denies diarrhea, constipation, nausea, vomiting, melena, hematochezia  : No increased frequency, dysuria, hematuria, nocturia  MSK: No joint pain/swelling; no back pain; no edema  Neuro: No dizziness/lightheadedness, weakness, seizures, numbness, tingling  Heme: No easy bruising or bleeding  Endo: No heat/cold intolerance  Psych: No significant nervousness, anxiety, stress, depression  All other systems were reviewed and are negative, except as noted.     PHYSICAL EXAM:  Constitutional: Well developed / well nourished  Eyes: Anicteric, PERRLA  ENMT: nc/at  Neck: supple  Respiratory: CTA B/L  Cardiovascular: RRR  Gastrointestinal: Soft, ND/NT   Genitourinary: Voiding spontaneously  Extremities: SCD's in place and working bilaterally  Vascular: Palpable dp pulses bilaterally, b/l LE foot wound dressing in place  Neurological: A&O x3  Skin: no rashes, ulcerations or lesions;  Musculoskeletal: Moving all extremities  Psychiatric: Responsive

## 2023-12-07 NOTE — DIETITIAN INITIAL EVALUATION ADULT - NS FNS REASON FOR WEIGHT CHANG
Weights:     Current Admission Weights:  - Dosing weight: 89.4 kg/ 197.1 pounds (12/6/23)  - Daily weight: 91.1 kg/200.8 pounds  (12/7/23    [facility]  [facility]/ 218.1 pounds (9/7/23)    Weight Change:  - Significant weight loss noted X 3 months. Pt reports weight loss is planned. He has been trying to loose weight, by incorporating more greens and lean protein into his diet and limiting his eating out at restaurants /fast food. Will continue to monitor weight status as able during present admission.    IBW: 160 pounds   %IBW: 125.5% Weights:     Current Admission Weights:  - Dosing weight: 89.4 kg/ 197.1 pounds (12/6/23)  - Daily weight: 91.1 kg/200.8 pounds  (12/7/23    [facility]  [facility]/ 218.1 pounds (9/7/23)    Weight Change:  - Significant weight loss noted X 3 months. Pt reports weight loss is planned. He has been trying to loose weight, by incorporating more greens and lean protein into his diet and limiting his eating out at restaurants /fast food. Will continue to monitor weight status as able during present admission.    IBW: 160 pounds   %IBW: 125.5%

## 2023-12-07 NOTE — DIETITIAN INITIAL EVALUATION ADULT - OTHER CALCULATIONS
-- Defer fluid needs to medical team  -- Estimated calorie/protein needs based on IBW of 160 pounds

## 2023-12-07 NOTE — PROGRESS NOTE ADULT - PROBLEM SELECTOR PLAN 1
VARSHA Malone MD have participated in the daily care of this patient  and have seen and examined the patient today and agree with  the  evaluation, assessment and plan of the surgical house officer  VARSHA Malone MD have personally seen and examined the patient at bedside today at 8  pm

## 2023-12-07 NOTE — DIETITIAN INITIAL EVALUATION ADULT - REASON INDICATOR FOR ASSESSMENT
MST Score 2 or >   Information obtained from: Review of pt's current medical record and interview with pt in his assigned room on 6MONTI

## 2023-12-07 NOTE — DIETITIAN INITIAL EVALUATION ADULT - PERTINENT LABORATORY DATA
12-07    137  |  106  |  21  ----------------------------<  144<H>  4.5   |  22  |  1.07    Ca    10.2      07 Dec 2023 06:59  Phos  2.1     12-07  Mg     1.7     12-07    TPro  6.3  /  Alb  3.7  /  TBili  0.3  /  DBili  x   /  AST  11  /  ALT  10  /  AlkPhos  97  12-06    POCT Blood Glucose.: 185 mg/dL (12-07-23 @ 12:19)  POCT Blood Glucose.: 164 mg/dL (12-07-23 @ 01:36)  POCT Blood Glucose.: 117 mg/dL (12-06-23 @ 22:47)  POCT Blood Glucose.: 81 mg/dL (12-06-23 @ 21:35)  POCT Blood Glucose.: 76 mg/dL (12-06-23 @ 18:59)  POCT Blood Glucose.: 102 mg/dL (12-06-23 @ 16:43)    A1C with Estimated Average Glucose Result: 6.3 % (11-02-23 @ 11:56)  A1C with Estimated Average Glucose Result: 5.8 % (07-26-23 @ 07:25)

## 2023-12-07 NOTE — DIETITIAN INITIAL EVALUATION ADULT - ADD RECOMMEND
1. Continue consistent carbohydrate diet. Defer diet/texture modification to medical team/SLP as indicated  2. Encourage adequate PO intake. [place] food preferences as appropriate and available. Staff to provide assistance with meals as warranted.  3. Monitor PO intake, GI tolerance, skin integrity and labs. RD remains available if needed, pt is aware.  1. Continue consistent carbohydrate diet. Defer diet/texture modification to medical team/SLP as indicated  2. Encourage adequate PO intake. [place] food preferences as appropriate and available. Staff to provide assistance with meals as warranted.  3. Monitor PO intake, GI tolerance, skin integrity and labs. RD remains available if needed, pt is aware.

## 2023-12-07 NOTE — DIETITIAN INITIAL EVALUATION ADULT - OTHER INFO
Pt with hx of DDRT 7/16/23:  -- Deltasone & Tacrolimus, transplant meds ordered  -- BG management: ordered for SSI, Lantus 17U at bedtime, Lispro 10 U 3x/day before meals. Current A1c=6.3 % (11-02-23), trending up from previous level of   A1C=5.8 % (07-26-23)  -- Urine output per flow sheets 250 ml thus far (12/7), 800 ml (12/6)  -- Labs: Low phosphorus, ordered for  K-PHOS.

## 2023-12-08 ENCOUNTER — APPOINTMENT (OUTPATIENT)
Dept: NEPHROLOGY | Facility: CLINIC | Age: 53
End: 2023-12-08

## 2023-12-08 LAB
-  AMIKACIN: SIGNIFICANT CHANGE UP
-  AMIKACIN: SIGNIFICANT CHANGE UP
-  AMOXICILLIN/CLAVULANIC ACID: SIGNIFICANT CHANGE UP
-  AMOXICILLIN/CLAVULANIC ACID: SIGNIFICANT CHANGE UP
-  AMPICILLIN/SULBACTAM: SIGNIFICANT CHANGE UP
-  AMPICILLIN/SULBACTAM: SIGNIFICANT CHANGE UP
-  AMPICILLIN: SIGNIFICANT CHANGE UP
-  AMPICILLIN: SIGNIFICANT CHANGE UP
-  AZTREONAM: SIGNIFICANT CHANGE UP
-  CEFAZOLIN: SIGNIFICANT CHANGE UP
-  CEFAZOLIN: SIGNIFICANT CHANGE UP
-  CEFEPIME: SIGNIFICANT CHANGE UP
-  CEFTAZIDIME: SIGNIFICANT CHANGE UP
-  CEFTAZIDIME: SIGNIFICANT CHANGE UP
-  CEFTRIAXONE: SIGNIFICANT CHANGE UP
-  CEFTRIAXONE: SIGNIFICANT CHANGE UP
-  CIPROFLOXACIN: SIGNIFICANT CHANGE UP
-  ERTAPENEM: SIGNIFICANT CHANGE UP
-  ERTAPENEM: SIGNIFICANT CHANGE UP
-  GENTAMICIN: SIGNIFICANT CHANGE UP
-  GENTAMICIN: SIGNIFICANT CHANGE UP
-  IMIPENEM: SIGNIFICANT CHANGE UP
-  LEVOFLOXACIN: SIGNIFICANT CHANGE UP
-  MEROPENEM: SIGNIFICANT CHANGE UP
-  PIPERACILLIN/TAZOBACTAM: SIGNIFICANT CHANGE UP
-  TOBRAMYCIN: SIGNIFICANT CHANGE UP
-  TOBRAMYCIN: SIGNIFICANT CHANGE UP
-  TRIMETHOPRIM/SULFAMETHOXAZOLE: SIGNIFICANT CHANGE UP
-  TRIMETHOPRIM/SULFAMETHOXAZOLE: SIGNIFICANT CHANGE UP
ANION GAP SERPL CALC-SCNC: 6 MMOL/L — SIGNIFICANT CHANGE UP (ref 5–17)
ANION GAP SERPL CALC-SCNC: 6 MMOL/L — SIGNIFICANT CHANGE UP (ref 5–17)
BASOPHILS # BLD AUTO: 0.04 K/UL — SIGNIFICANT CHANGE UP (ref 0–0.2)
BASOPHILS # BLD AUTO: 0.04 K/UL — SIGNIFICANT CHANGE UP (ref 0–0.2)
BASOPHILS NFR BLD AUTO: 1.6 % — SIGNIFICANT CHANGE UP (ref 0–2)
BASOPHILS NFR BLD AUTO: 1.6 % — SIGNIFICANT CHANGE UP (ref 0–2)
BUN SERPL-MCNC: 17 MG/DL — SIGNIFICANT CHANGE UP (ref 7–23)
BUN SERPL-MCNC: 17 MG/DL — SIGNIFICANT CHANGE UP (ref 7–23)
CALCIUM SERPL-MCNC: 10 MG/DL — SIGNIFICANT CHANGE UP (ref 8.4–10.5)
CALCIUM SERPL-MCNC: 10 MG/DL — SIGNIFICANT CHANGE UP (ref 8.4–10.5)
CHLORIDE SERPL-SCNC: 106 MMOL/L — SIGNIFICANT CHANGE UP (ref 96–108)
CHLORIDE SERPL-SCNC: 106 MMOL/L — SIGNIFICANT CHANGE UP (ref 96–108)
CO2 SERPL-SCNC: 24 MMOL/L — SIGNIFICANT CHANGE UP (ref 22–31)
CO2 SERPL-SCNC: 24 MMOL/L — SIGNIFICANT CHANGE UP (ref 22–31)
CREAT SERPL-MCNC: 1.15 MG/DL — SIGNIFICANT CHANGE UP (ref 0.5–1.3)
CREAT SERPL-MCNC: 1.15 MG/DL — SIGNIFICANT CHANGE UP (ref 0.5–1.3)
EGFR: 77 ML/MIN/1.73M2 — SIGNIFICANT CHANGE UP
EGFR: 77 ML/MIN/1.73M2 — SIGNIFICANT CHANGE UP
EOSINOPHIL # BLD AUTO: 0 K/UL — SIGNIFICANT CHANGE UP (ref 0–0.5)
EOSINOPHIL # BLD AUTO: 0 K/UL — SIGNIFICANT CHANGE UP (ref 0–0.5)
EOSINOPHIL NFR BLD AUTO: 0 % — SIGNIFICANT CHANGE UP (ref 0–6)
EOSINOPHIL NFR BLD AUTO: 0 % — SIGNIFICANT CHANGE UP (ref 0–6)
GLUCOSE BLDC GLUCOMTR-MCNC: 101 MG/DL — HIGH (ref 70–99)
GLUCOSE BLDC GLUCOMTR-MCNC: 101 MG/DL — HIGH (ref 70–99)
GLUCOSE BLDC GLUCOMTR-MCNC: 140 MG/DL — HIGH (ref 70–99)
GLUCOSE BLDC GLUCOMTR-MCNC: 140 MG/DL — HIGH (ref 70–99)
GLUCOSE BLDC GLUCOMTR-MCNC: 176 MG/DL — HIGH (ref 70–99)
GLUCOSE BLDC GLUCOMTR-MCNC: 82 MG/DL — SIGNIFICANT CHANGE UP (ref 70–99)
GLUCOSE BLDC GLUCOMTR-MCNC: 82 MG/DL — SIGNIFICANT CHANGE UP (ref 70–99)
GLUCOSE SERPL-MCNC: 173 MG/DL — HIGH (ref 70–99)
GLUCOSE SERPL-MCNC: 173 MG/DL — HIGH (ref 70–99)
HCT VFR BLD CALC: 33.3 % — LOW (ref 39–50)
HCT VFR BLD CALC: 33.3 % — LOW (ref 39–50)
HGB BLD-MCNC: 10.4 G/DL — LOW (ref 13–17)
HGB BLD-MCNC: 10.4 G/DL — LOW (ref 13–17)
IMM GRANULOCYTES NFR BLD AUTO: 2.4 % — HIGH (ref 0–0.9)
IMM GRANULOCYTES NFR BLD AUTO: 2.4 % — HIGH (ref 0–0.9)
LYMPHOCYTES # BLD AUTO: 0.24 K/UL — LOW (ref 1–3.3)
LYMPHOCYTES # BLD AUTO: 0.24 K/UL — LOW (ref 1–3.3)
LYMPHOCYTES # BLD AUTO: 9.6 % — LOW (ref 13–44)
LYMPHOCYTES # BLD AUTO: 9.6 % — LOW (ref 13–44)
MAGNESIUM SERPL-MCNC: 2 MG/DL — SIGNIFICANT CHANGE UP (ref 1.6–2.6)
MAGNESIUM SERPL-MCNC: 2 MG/DL — SIGNIFICANT CHANGE UP (ref 1.6–2.6)
MCHC RBC-ENTMCNC: 28.1 PG — SIGNIFICANT CHANGE UP (ref 27–34)
MCHC RBC-ENTMCNC: 28.1 PG — SIGNIFICANT CHANGE UP (ref 27–34)
MCHC RBC-ENTMCNC: 31.2 GM/DL — LOW (ref 32–36)
MCHC RBC-ENTMCNC: 31.2 GM/DL — LOW (ref 32–36)
MCV RBC AUTO: 90 FL — SIGNIFICANT CHANGE UP (ref 80–100)
MCV RBC AUTO: 90 FL — SIGNIFICANT CHANGE UP (ref 80–100)
METHOD TYPE: SIGNIFICANT CHANGE UP
MONOCYTES # BLD AUTO: 0.41 K/UL — SIGNIFICANT CHANGE UP (ref 0–0.9)
MONOCYTES # BLD AUTO: 0.41 K/UL — SIGNIFICANT CHANGE UP (ref 0–0.9)
MONOCYTES NFR BLD AUTO: 16.3 % — HIGH (ref 2–14)
MONOCYTES NFR BLD AUTO: 16.3 % — HIGH (ref 2–14)
NEUTROPHILS # BLD AUTO: 1.76 K/UL — LOW (ref 1.8–7.4)
NEUTROPHILS # BLD AUTO: 1.76 K/UL — LOW (ref 1.8–7.4)
NEUTROPHILS NFR BLD AUTO: 70.1 % — SIGNIFICANT CHANGE UP (ref 43–77)
NEUTROPHILS NFR BLD AUTO: 70.1 % — SIGNIFICANT CHANGE UP (ref 43–77)
NRBC # BLD: 0 /100 WBCS — SIGNIFICANT CHANGE UP (ref 0–0)
NRBC # BLD: 0 /100 WBCS — SIGNIFICANT CHANGE UP (ref 0–0)
PHOSPHATE SERPL-MCNC: 2.6 MG/DL — SIGNIFICANT CHANGE UP (ref 2.5–4.5)
PHOSPHATE SERPL-MCNC: 2.6 MG/DL — SIGNIFICANT CHANGE UP (ref 2.5–4.5)
PLATELET # BLD AUTO: 165 K/UL — SIGNIFICANT CHANGE UP (ref 150–400)
PLATELET # BLD AUTO: 165 K/UL — SIGNIFICANT CHANGE UP (ref 150–400)
POTASSIUM SERPL-MCNC: 4.5 MMOL/L — SIGNIFICANT CHANGE UP (ref 3.5–5.3)
POTASSIUM SERPL-MCNC: 4.5 MMOL/L — SIGNIFICANT CHANGE UP (ref 3.5–5.3)
POTASSIUM SERPL-SCNC: 4.5 MMOL/L — SIGNIFICANT CHANGE UP (ref 3.5–5.3)
POTASSIUM SERPL-SCNC: 4.5 MMOL/L — SIGNIFICANT CHANGE UP (ref 3.5–5.3)
RBC # BLD: 3.7 M/UL — LOW (ref 4.2–5.8)
RBC # BLD: 3.7 M/UL — LOW (ref 4.2–5.8)
RBC # FLD: 13.1 % — SIGNIFICANT CHANGE UP (ref 10.3–14.5)
RBC # FLD: 13.1 % — SIGNIFICANT CHANGE UP (ref 10.3–14.5)
SODIUM SERPL-SCNC: 136 MMOL/L — SIGNIFICANT CHANGE UP (ref 135–145)
SODIUM SERPL-SCNC: 136 MMOL/L — SIGNIFICANT CHANGE UP (ref 135–145)
TACROLIMUS SERPL-MCNC: 11.5 NG/ML — SIGNIFICANT CHANGE UP
TACROLIMUS SERPL-MCNC: 11.5 NG/ML — SIGNIFICANT CHANGE UP
VANCOMYCIN TROUGH SERPL-MCNC: 14.5 UG/ML — SIGNIFICANT CHANGE UP (ref 10–20)
VANCOMYCIN TROUGH SERPL-MCNC: 14.5 UG/ML — SIGNIFICANT CHANGE UP (ref 10–20)
WBC # BLD: 2.51 K/UL — LOW (ref 3.8–10.5)
WBC # BLD: 2.51 K/UL — LOW (ref 3.8–10.5)
WBC # FLD AUTO: 2.51 K/UL — LOW (ref 3.8–10.5)
WBC # FLD AUTO: 2.51 K/UL — LOW (ref 3.8–10.5)

## 2023-12-08 PROCEDURE — 99232 SBSQ HOSP IP/OBS MODERATE 35: CPT

## 2023-12-08 PROCEDURE — G0316 PROLONG INPT EVAL ADD15 M: CPT

## 2023-12-08 PROCEDURE — 99232 SBSQ HOSP IP/OBS MODERATE 35: CPT | Mod: GC

## 2023-12-08 PROCEDURE — 73718 MRI LOWER EXTREMITY W/O DYE: CPT | Mod: 26,LT

## 2023-12-08 PROCEDURE — 99233 SBSQ HOSP IP/OBS HIGH 50: CPT

## 2023-12-08 PROCEDURE — 93287 PERI-PX DEVICE EVAL & PRGR: CPT | Mod: 26,76

## 2023-12-08 RX ORDER — INSULIN GLARGINE 100 [IU]/ML
10 INJECTION, SOLUTION SUBCUTANEOUS AT BEDTIME
Refills: 0 | Status: DISCONTINUED | OUTPATIENT
Start: 2023-12-08 | End: 2023-12-11

## 2023-12-08 RX ADMIN — PIPERACILLIN AND TAZOBACTAM 25 GRAM(S): 4; .5 INJECTION, POWDER, LYOPHILIZED, FOR SOLUTION INTRAVENOUS at 09:04

## 2023-12-08 RX ADMIN — TACROLIMUS 5 MILLIGRAM(S): 5 CAPSULE ORAL at 09:06

## 2023-12-08 RX ADMIN — APIXABAN 5 MILLIGRAM(S): 2.5 TABLET, FILM COATED ORAL at 09:05

## 2023-12-08 RX ADMIN — CARVEDILOL PHOSPHATE 3.12 MILLIGRAM(S): 80 CAPSULE, EXTENDED RELEASE ORAL at 17:22

## 2023-12-08 RX ADMIN — Medication 81 MILLIGRAM(S): at 14:37

## 2023-12-08 RX ADMIN — Medication 2: at 18:12

## 2023-12-08 RX ADMIN — INSULIN GLARGINE 10 UNIT(S): 100 INJECTION, SOLUTION SUBCUTANEOUS at 22:50

## 2023-12-08 RX ADMIN — Medication 1 TABLET(S): at 14:37

## 2023-12-08 RX ADMIN — Medication 10 UNIT(S): at 18:14

## 2023-12-08 RX ADMIN — PIPERACILLIN AND TAZOBACTAM 25 GRAM(S): 4; .5 INJECTION, POWDER, LYOPHILIZED, FOR SOLUTION INTRAVENOUS at 18:12

## 2023-12-08 RX ADMIN — Medication 10 UNIT(S): at 14:37

## 2023-12-08 RX ADMIN — Medication 2: at 09:05

## 2023-12-08 RX ADMIN — PIPERACILLIN AND TAZOBACTAM 25 GRAM(S): 4; .5 INJECTION, POWDER, LYOPHILIZED, FOR SOLUTION INTRAVENOUS at 23:38

## 2023-12-08 RX ADMIN — APIXABAN 5 MILLIGRAM(S): 2.5 TABLET, FILM COATED ORAL at 21:42

## 2023-12-08 RX ADMIN — FAMOTIDINE 20 MILLIGRAM(S): 10 INJECTION INTRAVENOUS at 14:37

## 2023-12-08 RX ADMIN — Medication 2 TABLET(S): at 06:10

## 2023-12-08 RX ADMIN — Medication 166.67 MILLIGRAM(S): at 17:01

## 2023-12-08 RX ADMIN — Medication 10 MILLIGRAM(S): at 06:10

## 2023-12-08 RX ADMIN — Medication 10 UNIT(S): at 09:06

## 2023-12-08 RX ADMIN — CARVEDILOL PHOSPHATE 3.12 MILLIGRAM(S): 80 CAPSULE, EXTENDED RELEASE ORAL at 06:10

## 2023-12-08 RX ADMIN — Medication 166.67 MILLIGRAM(S): at 06:10

## 2023-12-08 RX ADMIN — ATORVASTATIN CALCIUM 40 MILLIGRAM(S): 80 TABLET, FILM COATED ORAL at 21:42

## 2023-12-08 NOTE — PROGRESS NOTE ADULT - SUBJECTIVE AND OBJECTIVE BOX
[facility] DIVISION OF KIDNEY DISEASES AND HYPERTENSION -- FOLLOW UP NOTE  --------------------------------------------------------------------------------    24 hour events/subjective:  -No acute events overnight  -Pt with suspected osteomyelitis of toes   -Occlusion of all 3 runoff vessels in the left calf.    PAST HISTORY  --------------------------------------------------------------------------------  No significant changes to PMH, PSH, FHx, SHx, unless otherwise noted    ALLERGIES & MEDICATIONS  --------------------------------------------------------------------------------  Allergies  Contrast. (Unknown)    Intolerances    Standing Inpatient Medications  apixaban 5 milliGRAM(s) Oral every 12 hours  aspirin enteric coated 81 milliGRAM(s) Oral daily  atorvastatin 40 milliGRAM(s) Oral at bedtime  carvedilol 3.125 milliGRAM(s) Oral every 12 hours  famotidine    Tablet 20 milliGRAM(s) Oral daily  insulin glargine Injectable (LANTUS) 17 Unit(s) SubCutaneous at bedtime  insulin lispro (ADMELOG) corrective regimen sliding scale   SubCutaneous at bedtime  insulin lispro (ADMELOG) corrective regimen sliding scale   SubCutaneous three times a day before meals  insulin lispro Injectable (ADMELOG) 10 Unit(s) SubCutaneous three times a day with meals  piperacillin/tazobactam IVPB.. 3.375 Gram(s) IV Intermittent every 8 hours  predniSONE   Tablet 10 milliGRAM(s) Oral daily  tacrolimus ER Tablet (ENVARSUS XR) 5 milliGRAM(s) Oral <User Schedule>  trimethoprim   80 mG/sulfamethoxazole 400 mG 1 Tablet(s) Oral daily  vancomycin  IVPB 1250 milliGRAM(s) IV Intermittent every 12 hours    PRN Inpatient Medications    REVIEW OF SYSTEMS  --------------------------------------------------------------------------------  Gen: No fevers/chills  Skin: No rashes  Head/Eyes/Ears/Mouth: No headache  Respiratory: No dyspnea, cough  CV: No chest pain  GI: No abdominal pain, diarrhea, constipation, nausea, vomiting  : No increased frequency, dysuria, hematuria  MSK: No edema  Neuro: No dizziness/lightheadedness, weakness    All other systems were reviewed and are negative, except as noted.    VITALS/PHYSICAL EXAM  --------------------------------------------------------------------------------  T(C): 36.6 (12-08-23 @ 09:00), Max: 36.9 (12-07-23 @ 21:15)  HR: 74 (12-08-23 @ 09:00) (71 - 89)  BP: 104/53 (12-08-23 @ 09:00) (104/53 - 190/81)  RR: 18 (12-08-23 @ 09:00) (18 - 18)  SpO2: 98% (12-08-23 @ 09:00) (96% - 100%)  Wt(kg): --    12-07-23 @ 07:01  -  12-08-23 @ 07:00  --------------------------------------------------------  IN: 1100 mL / OUT: 1250 mL / NET: -150 mL    12-08-23 @ 07:01  -  12-08-23 @ 12:02  --------------------------------------------------------  IN: 180 mL / OUT: 0 mL / NET: 180 mL    Physical Exam:  Gen: NAD  HEENT: Anicteric  Pulm: CTA B/L  CV: RRR, no murmur appreciated  Abd: +BS, soft and non distended abdomen. No tenderness to palpation. Transplant non tender, no bruit  : No suprapubic tenderness  Extremities: No edema, toe amputations, dressing on B/L LE  Skin: warm, stasis dermatitis  Neuro: Awake and alert    LABS/STUDIES  --------------------------------------------------------------------------------              10.4   2.51  >-----------<  165      [12-08-23 @ 07:19]              33.3     136  |  106  |  17  ----------------------------<  173      [12-08-23 @ 07:17]  4.5   |  24  |  1.15        Ca     10.0     [12-08-23 @ 07:17]      Mg     2.0     [12-08-23 @ 07:17]      Phos  2.6     [12-08-23 @ 07:17]    TPro  6.3  /  Alb  3.7  /  TBili  0.3  /  DBili  x   /  AST  11  /  ALT  10  /  AlkPhos  97  [12-06-23 @ 13:21]    Creatinine Trend:  SCr 1.15 [12-08 @ 07:17]  SCr 1.07 [12-07 @ 06:59]  SCr 1.00 [12-06 @ 13:21]    Tacrolimus (), Serum: 11.5 ng/mL (12-08 @ 07:23)  Tacrolimus (), Serum: 7.4 ng/mL (12-07 @ 07:01)  Tacrolimus (), Serum: 9.0 ng/mL (12-06 @ 13:21)    Urinalysis - [12-08-23 @ 07:17]      Color  / Appearance  / SG  / pH       Gluc 173 / Ketone   / Bili  / Urobili        Blood  / Protein  / Leuk Est  / Nitrite       RBC  / WBC  / Hyaline  / Gran  / Sq Epi  / Non Sq Epi  / Bacteria     Iron 68, TIBC 185, %sat 37      [08-16-23 @ 06:39]  Ferritin 1010      [08-16-23 @ 06:39]  PTH -- (Ca 10.6)      [11-02-23 @ 11:56]   220  Vitamin D (25OH) 18.0      [11-02-23 @ 11:56] [facility] DIVISION OF KIDNEY DISEASES AND HYPERTENSION -- FOLLOW UP NOTE  --------------------------------------------------------------------------------    24 hour events/subjective:  -No acute events overnight  -Pt with suspected osteomyelitis of toes   -Occlusion of all 3 runoff vessels in the left calf.    PAST HISTORY  --------------------------------------------------------------------------------  No significant changes to PMH, PSH, FHx, SHx, unless otherwise noted    ALLERGIES & MEDICATIONS  --------------------------------------------------------------------------------  Allergies  Contrast. (Unknown)    Intolerances    Standing Inpatient Medications  apixaban 5 milliGRAM(s) Oral every 12 hours  aspirin enteric coated 81 milliGRAM(s) Oral daily  atorvastatin 40 milliGRAM(s) Oral at bedtime  carvedilol 3.125 milliGRAM(s) Oral every 12 hours  famotidine    Tablet 20 milliGRAM(s) Oral daily  insulin glargine Injectable (LANTUS) 17 Unit(s) SubCutaneous at bedtime  insulin lispro (ADMELOG) corrective regimen sliding scale   SubCutaneous at bedtime  insulin lispro (ADMELOG) corrective regimen sliding scale   SubCutaneous three times a day before meals  insulin lispro Injectable (ADMELOG) 10 Unit(s) SubCutaneous three times a day with meals  piperacillin/tazobactam IVPB.. 3.375 Gram(s) IV Intermittent every 8 hours  predniSONE   Tablet 10 milliGRAM(s) Oral daily  tacrolimus ER Tablet (ENVARSUS XR) 5 milliGRAM(s) Oral <User Schedule>  trimethoprim   80 mG/sulfamethoxazole 400 mG 1 Tablet(s) Oral daily  vancomycin  IVPB 1250 milliGRAM(s) IV Intermittent every 12 hours    PRN Inpatient Medications    REVIEW OF SYSTEMS  --------------------------------------------------------------------------------  Gen: No fevers/chills  Skin: No rashes  Head/Eyes/Ears/Mouth: No headache  Respiratory: No dyspnea, cough  CV: No chest pain  GI: No abdominal pain, diarrhea, constipation, nausea, vomiting  : No increased frequency, dysuria, hematuria  MSK: No edema  Neuro: No dizziness/lightheadedness, weakness    All other systems were reviewed and are negative, except as noted.    VITALS/PHYSICAL EXAM  --------------------------------------------------------------------------------  T(C): 36.6 (12-08-23 @ 09:00), Max: 36.9 (12-07-23 @ 21:15)  HR: 74 (12-08-23 @ 09:00) (71 - 89)  BP: 104/53 (12-08-23 @ 09:00) (104/53 - 190/81)  RR: 18 (12-08-23 @ 09:00) (18 - 18)  SpO2: 98% (12-08-23 @ 09:00) (96% - 100%)  Wt(kg): --    12-07-23 @ 07:01  -  12-08-23 @ 07:00  --------------------------------------------------------  IN: 1100 mL / OUT: 1250 mL / NET: -150 mL    12-08-23 @ 07:01  -  12-08-23 @ 12:02  --------------------------------------------------------  IN: 180 mL / OUT: 0 mL / NET: 180 mL    Physical Exam:  Gen: NAD  HEENT: Anicteric  Pulm: CTA B/L  CV: RRR, no murmur appreciated  Abd: +BS, soft and non distended abdomen. No tenderness to palpation. Transplant non tender, no bruit  : No suprapubic tenderness  Extremities: No edema, toe amputations, dressing on B/L LE  Skin: warm, stasis dermatitis  Neuro: Awake and alert    LABS/STUDIES  --------------------------------------------------------------------------------              10.4   2.51  >-----------<  165      [12-08-23 @ 07:19]              33.3     136  |  106  |  17  ----------------------------<  173      [12-08-23 @ 07:17]  4.5   |  24  |  1.15        Ca     10.0     [12-08-23 @ 07:17]      Mg     2.0     [12-08-23 @ 07:17]      Phos  2.6     [12-08-23 @ 07:17]    TPro  6.3  /  Alb  3.7  /  TBili  0.3  /  DBili  x   /  AST  11  /  ALT  10  /  AlkPhos  97  [12-06-23 @ 13:21]    Creatinine Trend:  SCr 1.15 [12-08 @ 07:17]  SCr 1.07 [12-07 @ 06:59]  SCr 1.00 [12-06 @ 13:21]    Tacrolimus (), Serum: 11.5 ng/mL (12-08 @ 07:23)  Tacrolimus (), Serum: 7.4 ng/mL (12-07 @ 07:01)  Tacrolimus (), Serum: 9.0 ng/mL (12-06 @ 13:21)    Urinalysis - [12-08-23 @ 07:17]      Color  / Appearance  / SG  / pH       Gluc 173 / Ketone   / Bili  / Urobili        Blood  / Protein  / Leuk Est  / Nitrite       RBC  / WBC  / Hyaline  / Gran  / Sq Epi  / Non Sq Epi  / Bacteria     Iron 68, TIBC 185, %sat 37      [08-16-23 @ 06:39]  Ferritin 1010      [08-16-23 @ 06:39]  PTH -- (Ca 10.6)      [11-02-23 @ 11:56]   220  Vitamin D (25OH) 18.0      [11-02-23 @ 11:56]

## 2023-12-08 NOTE — PROGRESS NOTE ADULT - PROBLEM SELECTOR PLAN 1
Notes Recorded by Lexii Schwab RN on 4/4/2017 at 2:32 PM  Tried calling patient LMTCB results sent to [facility] with recommendations. Notes Recorded by Gia Artis MD on 4/2/2017 at 8:22 PM  Diabetes is uncontrolled.  hemog A1C should be 6.5   Yours VARSHA Malone MD have participated in the daily care of this patient  and have seen and examined the patient today and agree with  the  evaluation, assessment and plan of the surgical house officer  VARSHA Malone MD have personally seen and examined the patient at bedside today at 8  pm VARSHA Malone MD have participated in the daily care of this patient  and have seen and examined the patient today and agree with  the  evaluation, assessment and plan of the surgical house officer  VARSHA Malone MD have personally seen and examined the patient at bedside today at 2  pm

## 2023-12-08 NOTE — PROGRESS NOTE ADULT - ASSESSMENT
53M with PMH of HTN, HLD, DM2, CAD s/p CABG, HF s/p AICD, ESRD on HD since 2016 s/p DDRT (7/14/23) complicated by DGF, and toe amputations due to PVD/osteo, now presenting to the ED with concern for toe infection. Nephrology service consulted for management of immunosuppression of transplanted kidney.       1. 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   - Scr WNL at 1.1 today    2. Immunosuppression   - Initially received Simulect induction but changed to Thymoglobulin given delayed graft function.  - On MMF, Envarsus, and Cellcept  - Check serum tacrolimus level (trough) 30 mins prior to AM dose.  - Ppx with Valcyte and Bactrim  - Recommend holding cellcept at this time due to possible osteo  - Tacro level elevated at 11.5 today.    3. ?Osteomyelitis - Managed by Podiatry, Vascular, and Transplant ID.  4. HTN - intermittent elevated BPs otherwise improved control  5. DM2- On lantus and premeal insulin      If you have any questions, please feel free to contact me.  Stuart Franklin MD  Nephrology Fellow  [id number] / [employer] Teams (Preferred)  (After 4pm or on weekends, please call the on-call Fellow)   53M with PMH of HTN, HLD, DM2, CAD s/p CABG, HF s/p AICD, ESRD on HD since 2016 s/p DDRT (7/14/23) complicated by DGF, and toe amputations due to PVD/osteo, now presenting to the ED with concern for toe infection. Nephrology service consulted for management of immunosuppression of transplanted kidney.       1. 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   - Scr WNL at 1.1 today    2. Immunosuppression   - Initially received Simulect induction but changed to Thymoglobulin given delayed graft function.  - On MMF, Envarsus, and Cellcept  - Check serum tacrolimus level (trough) 30 mins prior to AM dose.  - Ppx with Valcyte and Bactrim  - Recommend holding cellcept at this time due to possible osteo  - Tacro level elevated at 11.5 today.    3. ?Osteomyelitis - Managed by Podiatry, Vascular, and Transplant ID.  4. HTN - intermittent elevated BPs otherwise improved control  5. DM2- On lantus and premeal insulin      If you have any questions, please feel free to contact me.  Stuart Franklin MD  Nephrology Fellow  [id number] / [employer] Teams (Preferred)  (After 4pm or on weekends, please call the on-call Fellow)

## 2023-12-08 NOTE — PROCEDURE NOTE - ADDITIONAL PROCEDURE DETAILS
Indication: Post MRI reprogramming  - normal sensing and pacing threshold  - stable lead impedance  - Changes Made: MRI sure scan mode turned off.  Patient returned to pre MRI settings

## 2023-12-08 NOTE — PROGRESS NOTE ADULT - SUBJECTIVE AND OBJECTIVE BOX
Subjective: Patient seen and examined. No new events except as noted.     SUBJECTIVE/ROS:  No chest pain, dyspnea, palpitation, or dizziness.       MEDICATIONS:  MEDICATIONS  (STANDING):  apixaban 5 milliGRAM(s) Oral every 12 hours  aspirin enteric coated 81 milliGRAM(s) Oral daily  atorvastatin 40 milliGRAM(s) Oral at bedtime  carvedilol 3.125 milliGRAM(s) Oral every 12 hours  famotidine    Tablet 20 milliGRAM(s) Oral daily  insulin glargine Injectable (LANTUS) 17 Unit(s) SubCutaneous at bedtime  insulin lispro (ADMELOG) corrective regimen sliding scale   SubCutaneous three times a day before meals  insulin lispro (ADMELOG) corrective regimen sliding scale   SubCutaneous at bedtime  insulin lispro Injectable (ADMELOG) 10 Unit(s) SubCutaneous three times a day with meals  piperacillin/tazobactam IVPB.. 3.375 Gram(s) IV Intermittent every 8 hours  predniSONE   Tablet 10 milliGRAM(s) Oral daily  tacrolimus ER Tablet (ENVARSUS XR) 5 milliGRAM(s) Oral <User Schedule>  trimethoprim   80 mG/sulfamethoxazole 400 mG 1 Tablet(s) Oral daily  vancomycin  IVPB 1250 milliGRAM(s) IV Intermittent every 12 hours      PHYSICAL EXAM:  T(C): 36.8 (12-08-23 @ 06:05), Max: 36.9 (12-07-23 @ 21:15)  HR: 72 (12-08-23 @ 06:05) (71 - 89)  BP: 138/76 (12-08-23 @ 06:05) (107/68 - 190/81)  RR: 18 (12-08-23 @ 06:05) (18 - 18)  SpO2: 100% (12-08-23 @ 06:05) (96% - 100%)  Wt(kg): --  I&O's Summary    07 Dec 2023 07:01  -  08 Dec 2023 07:00  --------------------------------------------------------  IN: 1100 mL / OUT: 1250 mL / NET: -150 mL            JVP: Normal  Neck: supple  Lung: clear   CV: S1 S2 , Murmur:  Abd: soft  Ext: No edema  neuro: Awake / alert  Psych: flat affect  `    LABS/DATA:    CARDIAC MARKERS:                                10.4   2.51  )-----------( 165      ( 08 Dec 2023 07:19 )             33.3     12-08    136  |  106  |  17  ----------------------------<  173<H>  4.5   |  24  |  1.15    Ca    10.0      08 Dec 2023 07:17  Phos  2.6     12-08  Mg     2.0     12-08    TPro  6.3  /  Alb  3.7  /  TBili  0.3  /  DBili  x   /  AST  11  /  ALT  10  /  AlkPhos  97  12-06    proBNP:   Lipid Profile:   HgA1c:   TSH:     TELE:  EKG:

## 2023-12-08 NOTE — PROGRESS NOTE ADULT - SUBJECTIVE AND OBJECTIVE BOX
Afebrile  JAN/PVR findings significant for small vessel disease   left ulcer cx growing Klebsiella pneumoniae, PsA and E faecalis        Vital Signs Last 24 Hrs  T(C): 36.8 (08 Dec 2023 06:05), Max: 36.9 (07 Dec 2023 21:15)  T(F): 98.2 (08 Dec 2023 06:05), Max: 98.5 (07 Dec 2023 21:15)  HR: 72 (08 Dec 2023 06:05) (71 - 89)  BP: 138/76 (08 Dec 2023 06:05) (107/68 - 190/81)  BP(mean): --  RR: 18 (08 Dec 2023 06:05) (18 - 18)  SpO2: 100% (08 Dec 2023 06:05) (96% - 100%)    Parameters below as of 08 Dec 2023 06:05  Patient On (Oxygen Delivery Method): room air      CONSTITUTIONAL: Well groomed, no apparent distress  EYES: PERRLA and symmetric, EOMI, No conjunctival or scleral injection, non-icteric  ENMT: Oral mucosa with moist membranes. Normal dentition; no pharyngeal injection or exudates             NECK: Supple, symmetric and without tracheal deviation   RESP: No respiratory distress, no use of accessory muscles; CTA b/l, no WRR  CV: RRR, +S1S2, no MRG; no JVD; no peripheral edema  GI: Soft, NT, ND, no rebound, no guarding; no palpable masses; no hepatosplenomegaly; no hernia palpated  LYMPH: No cervical LAD or tenderness; no axillary LAD or tenderness; no inguinal LAD or tenderness  MSK: Normal gait; No digital clubbing or cyanosis; examination of the (head/neck/spine/ribs/pelvis, RUE, LUE, RLE, LLE) without misalignment,            Normal ROM without pain, no spinal tenderness, normal muscle strength/tone  SKIN: left foot dorsal medial ulcer with surrounding erythema, no discharge  R foot metatarsal stump c/d/i , 3d toe tip with dry ulcer, necrotic. no erythema surrounding this          ____________________________________________________  ROS  GENERAL: denies chills, , night sweats, weight loss.   PSYCH: denies depression, anxiety, suicidal ideation, hallucination, and delusions  SKIN: no rash or lesions; no color changes, no abnormal nevi,no  dryness, and nojaundice    EYES: denies visual changes, floaters, pain, inflammation, blurred vision, and discharge  ENT: denies tinnitus, vertigo, epistaxis, oral lesion, and decreased acuity  PULM: denies, hemoptysis, pleurisy  CVS: denies angina, palpitations,+ orthopnea, no syncope, or heart murmur  GI: denies constipation, diarrhea, melena, abdominal pain, nausea.   : denies dysuria, frequency, discharge, incontinence, stones or macroscopic hematuria  MS: no arthralgias, no erythema or swelling, no myalgias, noedema, or lower back pain.   CNS: denies numbness, dizziness, seizure, or tremor  ENDO: denies heat/cold intolerance, polyuria, polydipsia, malaise.    HEME: denies bruising, bleeding, lymphadenopathy, anemia, and calf pain    Allergies  Contrast. (Unknown)    __________________________________________________  MEDS:  MEDICATIONS  (STANDING):  apixaban 5 every 12 hours  aspirin enteric coated 81 daily  atorvastatin 40 at bedtime  carvedilol 3.125 every 12 hours  famotidine    Tablet 20 daily  insulin glargine Injectable (LANTUS) 17 at bedtime  insulin lispro (ADMELOG) corrective regimen sliding scale  at bedtime  insulin lispro (ADMELOG) corrective regimen sliding scale  three times a day before meals  insulin lispro Injectable (ADMELOG) 10 three times a day with meals  predniSONE   Tablet 10 daily  tacrolimus ER Tablet (ENVARSUS XR) 5 <User Schedule>    _________________________________________________  ANTIMICOBIALS  piperacillin/tazobactam IVPB.. 3.375 every 8 hours  tacrolimus ER Tablet (ENVARSUS XR) 5 <User Schedule>  trimethoprim   80 mG/sulfamethoxazole 400 mG 1 daily  vancomycin  IVPB 1250 every 12 hours      GENERAL LABS              10.4                 x    | x    | x            2.51  >-----------< 165     ------------------------< x                     33.3                 x    | x    | x                                            Ca x     Mg x     Ph x        Vancomycin Level, Trough: 14.5 (12-08 @ 07:22)    Urinalysis Basic - ( 08 Dec 2023 07:17 )    Color: x / Appearance: x / SG: x / pH: x  Gluc: 173 mg/dL / Ketone: x  / Bili: x / Urobili: x   Blood: x / Protein: x / Nitrite: x   Leuk Esterase: x / RBC: x / WBC x   Sq Epi: x / Non Sq Epi: x / Bacteria: x        _________________________________________________  MICROBIOLOGY  -----------    Culture - Abscess with Gram Stain (collected 06 Dec 2023 16:59)  Source: .Abscess left foot  Preliminary Report (07 Dec 2023 19:41):    Numerous Klebsiella pneumoniae    Numerous Pseudomonas aeruginosa    Numerous Enterococcus faecalis    Culture - Blood (collected 06 Dec 2023 13:08)  Source: .Blood Blood  Preliminary Report (07 Dec 2023 18:01):    No growth at 24 hours    Culture - Blood (collected 06 Dec 2023 12:40)  Source: .Blood Blood  Preliminary Report (07 Dec 2023 18:01):    No growth at 24 hours            CMVPCR Log: NotDetec Pyw42MH/mL (12-07 @ 07:01)          Fungitell:   _______________________________________________  PERTINENT IMAGING       Afebrile  JAN/PVR findings significant for small vessel disease   left ulcer cx growing Klebsiella pneumoniae, PsA and E faecalis        Vital Signs Last 24 Hrs  T(C): 36.8 (08 Dec 2023 06:05), Max: 36.9 (07 Dec 2023 21:15)  T(F): 98.2 (08 Dec 2023 06:05), Max: 98.5 (07 Dec 2023 21:15)  HR: 72 (08 Dec 2023 06:05) (71 - 89)  BP: 138/76 (08 Dec 2023 06:05) (107/68 - 190/81)  BP(mean): --  RR: 18 (08 Dec 2023 06:05) (18 - 18)  SpO2: 100% (08 Dec 2023 06:05) (96% - 100%)    Parameters below as of 08 Dec 2023 06:05  Patient On (Oxygen Delivery Method): room air      CONSTITUTIONAL: Well groomed, no apparent distress  EYES: PERRLA and symmetric, EOMI, No conjunctival or scleral injection, non-icteric  ENMT: Oral mucosa with moist membranes. Normal dentition; no pharyngeal injection or exudates             NECK: Supple, symmetric and without tracheal deviation   RESP: No respiratory distress, no use of accessory muscles; CTA b/l, no WRR  CV: RRR, +S1S2, no MRG; no JVD; no peripheral edema  GI: Soft, NT, ND, no rebound, no guarding; no palpable masses; no hepatosplenomegaly; no hernia palpated  LYMPH: No cervical LAD or tenderness; no axillary LAD or tenderness; no inguinal LAD or tenderness  MSK: Normal gait; No digital clubbing or cyanosis; examination of the (head/neck/spine/ribs/pelvis, RUE, LUE, RLE, LLE) without misalignment,            Normal ROM without pain, no spinal tenderness, normal muscle strength/tone  SKIN: left foot dorsal medial ulcer with surrounding erythema, no discharge  R foot metatarsal stump c/d/i , 3d toe tip with dry ulcer, necrotic. no erythema surrounding this          ____________________________________________________  ROS  GENERAL: denies chills, , night sweats, weight loss.   PSYCH: denies depression, anxiety, suicidal ideation, hallucination, and delusions  SKIN: no rash or lesions; no color changes, no abnormal nevi,no  dryness, and nojaundice    EYES: denies visual changes, floaters, pain, inflammation, blurred vision, and discharge  ENT: denies tinnitus, vertigo, epistaxis, oral lesion, and decreased acuity  PULM: denies, hemoptysis, pleurisy  CVS: denies angina, palpitations,+ orthopnea, no syncope, or heart murmur  GI: denies constipation, diarrhea, melena, abdominal pain, nausea.   : denies dysuria, frequency, discharge, incontinence, stones or macroscopic hematuria  MS: no arthralgias, no erythema or swelling, no myalgias, noedema, or lower back pain.   CNS: denies numbness, dizziness, seizure, or tremor  ENDO: denies heat/cold intolerance, polyuria, polydipsia, malaise.    HEME: denies bruising, bleeding, lymphadenopathy, anemia, and calf pain    Allergies  Contrast. (Unknown)    __________________________________________________  MEDS:  MEDICATIONS  (STANDING):  apixaban 5 every 12 hours  aspirin enteric coated 81 daily  atorvastatin 40 at bedtime  carvedilol 3.125 every 12 hours  famotidine    Tablet 20 daily  insulin glargine Injectable (LANTUS) 17 at bedtime  insulin lispro (ADMELOG) corrective regimen sliding scale  at bedtime  insulin lispro (ADMELOG) corrective regimen sliding scale  three times a day before meals  insulin lispro Injectable (ADMELOG) 10 three times a day with meals  predniSONE   Tablet 10 daily  tacrolimus ER Tablet (ENVARSUS XR) 5 <User Schedule>    _________________________________________________  ANTIMICOBIALS  piperacillin/tazobactam IVPB.. 3.375 every 8 hours  tacrolimus ER Tablet (ENVARSUS XR) 5 <User Schedule>  trimethoprim   80 mG/sulfamethoxazole 400 mG 1 daily  vancomycin  IVPB 1250 every 12 hours      GENERAL LABS              10.4                 x    | x    | x            2.51  >-----------< 165     ------------------------< x                     33.3                 x    | x    | x                                            Ca x     Mg x     Ph x        Vancomycin Level, Trough: 14.5 (12-08 @ 07:22)    Urinalysis Basic - ( 08 Dec 2023 07:17 )    Color: x / Appearance: x / SG: x / pH: x  Gluc: 173 mg/dL / Ketone: x  / Bili: x / Urobili: x   Blood: x / Protein: x / Nitrite: x   Leuk Esterase: x / RBC: x / WBC x   Sq Epi: x / Non Sq Epi: x / Bacteria: x        _________________________________________________  MICROBIOLOGY  -----------    Culture - Abscess with Gram Stain (collected 06 Dec 2023 16:59)  Source: .Abscess left foot  Preliminary Report (07 Dec 2023 19:41):    Numerous Klebsiella pneumoniae    Numerous Pseudomonas aeruginosa    Numerous Enterococcus faecalis    Culture - Blood (collected 06 Dec 2023 13:08)  Source: .Blood Blood  Preliminary Report (07 Dec 2023 18:01):    No growth at 24 hours    Culture - Blood (collected 06 Dec 2023 12:40)  Source: .Blood Blood  Preliminary Report (07 Dec 2023 18:01):    No growth at 24 hours            CMVPCR Log: NotDetec Spv26WC/mL (12-07 @ 07:01)          Fungitell:   _______________________________________________  PERTINENT IMAGING

## 2023-12-08 NOTE — PROGRESS NOTE ADULT - SUBJECTIVE AND OBJECTIVE BOX
SUBJECTIVE: Patient seen and examined on AM rounds. Patient reports that they're feeling well. Denies fever, chills. Reports pain as controlled. No complaints at this time.     Vital Signs Last 24 Hrs  T(C): 36.8 (08 Dec 2023 06:05), Max: 36.9 (07 Dec 2023 21:15)  T(F): 98.2 (08 Dec 2023 06:05), Max: 98.5 (07 Dec 2023 21:15)  HR: 72 (08 Dec 2023 06:05) (71 - 89)  BP: 138/76 (08 Dec 2023 06:05) (107/68 - 190/81)  BP(mean): --  RR: 18 (08 Dec 2023 06:05) (18 - 18)  SpO2: 100% (08 Dec 2023 06:05) (96% - 100%)    Parameters below as of 08 Dec 2023 06:05  Patient On (Oxygen Delivery Method): room air        General Appearance: Appears well, NAD  Neck: Supple  Chest: Equal expansion bilaterally  CV: Pulse regular presently  Abdomen: Soft, nontense  Extremities: -LLE: 1st metatarsal ulcer, +DP/PT signals, warm, motor/ sensory intact   -RLE: 3rd toe wound with dry gangrene, +DP/ PT signals, warm, motor/ sensory intact     I&O's Summary    07 Dec 2023 07:01  -  08 Dec 2023 07:00  --------------------------------------------------------  IN: 1100 mL / OUT: 1250 mL / NET: -150 mL      I&O's Detail    07 Dec 2023 07:01  -  08 Dec 2023 07:00  --------------------------------------------------------  IN:    Oral Fluid: 1100 mL  Total IN: 1100 mL    OUT:    Voided (mL): 1250 mL  Total OUT: 1250 mL    Total NET: -150 mL          LABS:                        10.4   2.51  )-----------( 165      ( 08 Dec 2023 07:19 )             33.3     12-08    136  |  106  |  17  ----------------------------<  173<H>  4.5   |  24  |  1.15    Ca    10.0      08 Dec 2023 07:17  Phos  2.6     12-08  Mg     2.0     12-08    TPro  6.3  /  Alb  3.7  /  TBili  0.3  /  DBili  x   /  AST  11  /  ALT  10  /  AlkPhos  97  12-06      Urinalysis Basic - ( 08 Dec 2023 07:17 )    Color: x / Appearance: x / SG: x / pH: x  Gluc: 173 mg/dL / Ketone: x  / Bili: x / Urobili: x   Blood: x / Protein: x / Nitrite: x   Leuk Esterase: x / RBC: x / WBC x   Sq Epi: x / Non Sq Epi: x / Bacteria: x        RADIOLOGY & ADDITIONAL STUDIES: SUBJECTIVE: Patient seen and examined on AM rounds. Patient reports that they're feeling well. Denies fever, chills. Reports pain as controlled. No complaints at this time.     Vital Signs Last 24 Hrs  T(C): 36.8 (08 Dec 2023 06:05), Max: 36.9 (07 Dec 2023 21:15)  T(F): 98.2 (08 Dec 2023 06:05), Max: 98.5 (07 Dec 2023 21:15)  HR: 72 (08 Dec 2023 06:05) (71 - 89)  BP: 138/76 (08 Dec 2023 06:05) (107/68 - 190/81)  BP(mean): --  RR: 18 (08 Dec 2023 06:05) (18 - 18)  SpO2: 100% (08 Dec 2023 06:05) (96% - 100%)    Parameters below as of 08 Dec 2023 06:05  Patient On (Oxygen Delivery Method): room air        General Appearance: Appears well, NAD  Neck: Supple  Chest: Equal expansion bilaterally  CV: Pulse regular presently  Abdomen: Soft, nontense  Extremities: -LLE: 1st metatarsal ulcer, +DP/PT signals, warm, motor/ sensory intact   -RLE: 3rd toe wound with dry gangrene, +DP/ PT signals, warm, motor/ sensory intact     I&O's Summary    07 Dec 2023 07:01  -  08 Dec 2023 07:00  --------------------------------------------------------  IN: 1100 mL / OUT: 1250 mL / NET: -150 mL      I&O's Detail    07 Dec 2023 07:01  -  08 Dec 2023 07:00  --------------------------------------------------------  IN:    Oral Fluid: 1100 mL  Total IN: 1100 mL    OUT:    Voided (mL): 1250 mL  Total OUT: 1250 mL    Total NET: -150 mL          LABS:                        10.4   2.51  )-----------( 165      ( 08 Dec 2023 07:19 )             33.3     12-08    136  |  106  |  17  ----------------------------<  173<H>  4.5   |  24  |  1.15    Ca    10.0      08 Dec 2023 07:17  Phos  2.6     12-08  Mg     2.0     12-08    TPro  6.3  /  Alb  3.7  /  TBili  0.3  /  DBili  x   /  AST  11  /  ALT  10  /  AlkPhos  97  12-06      Urinalysis Basic - ( 08 Dec 2023 07:17 )    Color: x / Appearance: x / SG: x / pH: x  Gluc: 173 mg/dL / Ketone: x  / Bili: x / Urobili: x   Blood: x / Protein: x / Nitrite: x   Leuk Esterase: x / RBC: x / WBC x   Sq Epi: x / Non Sq Epi: x / Bacteria: x

## 2023-12-08 NOTE — PROCEDURE NOTE - ADDITIONAL PROCEDURE DETAILS
Indication: Pre MRI reprogramming  - normal sensing and pacing threshold  - stable lead impedance  - Changes Made: Patient placed in MRI sure scan  - Call post MRI to reprogram

## 2023-12-08 NOTE — PROGRESS NOTE ADULT - ASSESSMENT
ASSESSMENT: 54 y/o M with PMH of HTN, CHF (s/p AICD 2016), CAD (s/p CABG 2015), IDDM, PVD s/p stent x4 in RLE with R big toe/second toe amputation (2021) and ESRD on HD via LUE AVF for 6 years now s/p DDRT 7/16/2023 presenting to the ED with infected R 3rd toe and left 1st metatarsal ulcers. Patient follows with Dr. Malone of Vascular Surgery. Patient states he has had these wounds for a while, follows with Podiatry. However, states that over the past few weeks, they have begun to look worse, specifically the left 1st metatarsal ulcer. Vascular surgery consulted for evaluation:    RECS:  - JAN/PVR findings significant for small vessel disease   - pt is cleared for rt toe podiatric intervention  - pt is aware that there still is a possibility of limited healing   - d/w renal attending; recommend MRA   - will follow     Plan discussed with fellow on behalf of attending.     Vascular Surgery, [id number]  ASSESSMENT: 52 y/o M with PMH of HTN, CHF (s/p AICD 2016), CAD (s/p CABG 2015), IDDM, PVD s/p stent x4 in RLE with R big toe/second toe amputation (2021) and ESRD on HD via LUE AVF for 6 years now s/p DDRT 7/16/2023 presenting to the ED with infected R 3rd toe and left 1st metatarsal ulcers. Patient follows with Dr. Malone of Vascular Surgery. Patient states he has had these wounds for a while, follows with Podiatry. However, states that over the past few weeks, they have begun to look worse, specifically the left 1st metatarsal ulcer. Vascular surgery consulted for evaluation:    RECS:  - JAN/PVR findings significant for small vessel disease   - pt is cleared for rt toe podiatric intervention  - pt is aware that there still is a possibility of limited healing   - d/w renal attending; recommend MRA   - will follow     Plan discussed with fellow on behalf of attending.     Vascular Surgery, [id number]  ASSESSMENT: 52 y/o M with PMH of HTN, CHF (s/p AICD 2016), CAD (s/p CABG 2015), IDDM, PVD s/p stent x4 in RLE with R big toe/second toe amputation (2021) and ESRD on HD via LUE AVF for 6 years now s/p DDRT 7/16/2023 presenting to the ED with infected R 3rd toe and left 1st metatarsal ulcers. Patient follows with Dr. Malone of Vascular Surgery. Patient states he has had these wounds for a while, follows with Podiatry. However, states that over the past few weeks, they have begun to look worse, specifically the left 1st metatarsal ulcer. Vascular surgery consulted for evaluation:    RECS:  - mri results pending  will follow

## 2023-12-08 NOTE — PROGRESS NOTE ADULT - ASSESSMENT
54 y/o M with past medical history significant for IDDM, CAD s/p CABG x4v (2015) s/p AICD (2016), PVD (4 stents in RLE), HTN, HLD and ESRD previously on HD via LUE AVF (ligated 9/8/23) now s/p DDRT 7/16/2023 with Simulect -> Thymoglobulin induction. His post-transplant course was complicated by DGF and subtly AMR with +DSAs s/p PLEX/IVIG (8/2023) and L subclavian thrombus remains on Eliquis presents to [facility] ED from wound care clinic on 12/6/23 after concern for osteomyelitis from clinic providers.     [ ] c/f bilateral foot osteomyelitis  - Vascular andpodiatry consulted reccs appreciated  - Pod: planning for debridement and amputation on Monday, as per them cont Eliquis and ASA  - XR concerning for osteomyelitis of both feet  - Transplant ID reccs appreciated   - MRI R/L foot: shows osteomyelitises   - cont Zosyn/Vanco for empiric coverage   -vanco trough 14.5   - Follow up blood cultures sent in ED     [ ] s/p DDRT 7/16/2023   - Baseline Cr ~1.0   - Immuno: ENv hold am dose, f/u levels 12/9, MMF HELD d/t c/f for osteomyelitis , Pred 10   - PPx: Bactrim, Pepcid   - Leukopenia: Daily CBC w/ diff, holding Valcyte,       [ ] IDDM  - Continue Lantus/Lispro home dose   - A1C w/ AM labs     [ ] CHF, CAD s/p CABG, L SC DVT   - Continue Eliquis, ASA 81   - Continue Coreg, Lipitor       plan d/w transplant surgery team   52 y/o M with past medical history significant for IDDM, CAD s/p CABG x4v (2015) s/p AICD (2016), PVD (4 stents in RLE), HTN, HLD and ESRD previously on HD via LUE AVF (ligated 9/8/23) now s/p DDRT 7/16/2023 with Simulect -> Thymoglobulin induction. His post-transplant course was complicated by DGF and subtly AMR with +DSAs s/p PLEX/IVIG (8/2023) and L subclavian thrombus remains on Eliquis presents to [facility] ED from wound care clinic on 12/6/23 after concern for osteomyelitis from clinic providers.     [ ] c/f bilateral foot osteomyelitis  - Vascular andpodiatry consulted reccs appreciated  - Pod: planning for debridement and amputation on Monday, as per them cont Eliquis and ASA  - XR concerning for osteomyelitis of both feet  - Transplant ID reccs appreciated   - MRI R/L foot: shows osteomyelitises   - cont Zosyn/Vanco for empiric coverage   -vanco trough 14.5   - Follow up blood cultures sent in ED     [ ] s/p DDRT 7/16/2023   - Baseline Cr ~1.0   - Immuno: ENv hold am dose, f/u levels 12/9, MMF HELD d/t c/f for osteomyelitis , Pred 10   - PPx: Bactrim, Pepcid   - Leukopenia: Daily CBC w/ diff, holding Valcyte,       [ ] IDDM  - Continue Lantus/Lispro home dose   - A1C w/ AM labs     [ ] CHF, CAD s/p CABG, L SC DVT   - Continue Eliquis, ASA 81   - Continue Coreg, Lipitor       plan d/w transplant surgery team

## 2023-12-08 NOTE — PROGRESS NOTE ADULT - ASSESSMENT
Left foot:    Status post amputation of the fifth metatarsal and proximal aspect of the   fifth proximal phalanx. Cortical irregularity along the fifth distal   phalanx, which may represent osteomyelitis. Correlate clinically for   overlying ulcer. MRI can be completed for further evaluation as   clinically indicated.    No acute displaced fracture or dislocation.  Tee Salvador is a 52 y/o M with past medical history significant for HTN, CHF (s/p AICD 2016), CAD (s/p CABG 2015), IDDM, PVD s/p stent x4 in RLE with R big toe/second toe amputation (2021) and ESRD on HD via LUE AVF for 6 years now s/p DDRT 7/16/2023 with Thymoglobulin induction. Post-transplant course complicated by severe constipation requiring disimpaction, DGF requiring hemodialysis, admission in August with K pneumoniae UTI treated with cefazolin then keflex. He has chronic bilateral foot wounds followed by wound care. PAtient states in the last week he started noticing some redness around the left leg foot ulcer with no pain or discharge. The podiatrist also noted necrosis of the tip of the third right toe. He was sent to ER for iv antibitoics and management. He has not received any antibiotics. He denies fever but felt chills a week back. On admission, afebrile, WBC 2.4.       Xray   Right foot:  Evaluation of the toes is limited due to patient positioning.  Status post amputation of the first ray to the level of the first   metatarsal head. There is minimal cortical irregularity with mild   progressive lucency within the first metatarsal head, which may represent   underlying osteomyelitis. Correlate clinically for overlying ulcer.  Status post amputation of the second toe to the level of the second   proximal phalanx with mild cortical irregularity along the amputation   margin of the second proximal phalanx, which may represent osteomyelitis.   Correlate clinically for overlying ulcer.      1. S/p DDKT 7/16/23 CMV D+/+, EBV D+/R+  Induction ATG  maintenance. tacro, mmf, pred  PPx; valcyte and bactrim  Complications DGF requiring HD    2. Left foot ulcer and cellulitis  3. Right 3d toe necrotic ulcer  4. History of first ray amputation 8/2021 - tissue cx grew E faecalis, Corynebacterium prevotella spp, Lecrercia spp   CXR with concern for OM although not in areas of concern on exam  Vascular studies with small vessel disease.     Left ulcer culture sent ? debrided tossue: Klebsiella pneumoniae, PsA and E faecalis  zosyn/vanc 12/6-    Recommend  ·	BC x2,   ·	MRi of both feet  ·	ESR, CRP in the 30's  ·	continue zosyn 3.375 q8h , vancomycin 1 g q12h with goal of trough 10-15 - check trough priro to 4th dose  given cellulitis surrounding the left ulcer  ·	plan for second toe amputation and debridement of left foot ulcer" pelase send DEEP LEFT ulcer tissue bacterial cx, MARGIn histopath from R toe and MARGIN bacterial cultures   ·	continue valcyte and bactrim ppx    Thank you for involving us in the care of this patient  Transplant ID will continue to follow  Please call or page with additional questions  Pager; #[phone]  Teams: from 8 am to 5 pm  Elisabet Nguyen MD         Left foot:    Status post amputation of the fifth metatarsal and proximal aspect of the   fifth proximal phalanx. Cortical irregularity along the fifth distal   phalanx, which may represent osteomyelitis. Correlate clinically for   overlying ulcer. MRI can be completed for further evaluation as   clinically indicated.    No acute displaced fracture or dislocation.  Tee Salvador is a 54 y/o M with past medical history significant for HTN, CHF (s/p AICD 2016), CAD (s/p CABG 2015), IDDM, PVD s/p stent x4 in RLE with R big toe/second toe amputation (2021) and ESRD on HD via LUE AVF for 6 years now s/p DDRT 7/16/2023 with Thymoglobulin induction. Post-transplant course complicated by severe constipation requiring disimpaction, DGF requiring hemodialysis, admission in August with K pneumoniae UTI treated with cefazolin then keflex. He has chronic bilateral foot wounds followed by wound care. PAtient states in the last week he started noticing some redness around the left leg foot ulcer with no pain or discharge. The podiatrist also noted necrosis of the tip of the third right toe. He was sent to ER for iv antibitoics and management. He has not received any antibiotics. He denies fever but felt chills a week back. On admission, afebrile, WBC 2.4.       Xray   Right foot:  Evaluation of the toes is limited due to patient positioning.  Status post amputation of the first ray to the level of the first   metatarsal head. There is minimal cortical irregularity with mild   progressive lucency within the first metatarsal head, which may represent   underlying osteomyelitis. Correlate clinically for overlying ulcer.  Status post amputation of the second toe to the level of the second   proximal phalanx with mild cortical irregularity along the amputation   margin of the second proximal phalanx, which may represent osteomyelitis.   Correlate clinically for overlying ulcer.      1. S/p DDKT 7/16/23 CMV D+/+, EBV D+/R+  Induction ATG  maintenance. tacro, mmf, pred  PPx; valcyte and bactrim  Complications DGF requiring HD    2. Left foot ulcer and cellulitis  3. Right 3d toe necrotic ulcer  4. History of first ray amputation 8/2021 - tissue cx grew E faecalis, Corynebacterium prevotella spp, Lecrercia spp   CXR with concern for OM although not in areas of concern on exam  Vascular studies with small vessel disease.     Left ulcer culture sent ? debrided tossue: Klebsiella pneumoniae, PsA and E faecalis  zosyn/vanc 12/6-    Recommend  ·	BC x2,   ·	MRi of both feet  ·	ESR, CRP in the 30's  ·	continue zosyn 3.375 q8h , vancomycin 1 g q12h with goal of trough 10-15 - check trough priro to 4th dose  given cellulitis surrounding the left ulcer  ·	plan for second toe amputation and debridement of left foot ulcer" pelase send DEEP LEFT ulcer tissue bacterial cx, MARGIn histopath from R toe and MARGIN bacterial cultures   ·	continue valcyte and bactrim ppx    Thank you for involving us in the care of this patient  Transplant ID will continue to follow  Please call or page with additional questions  Pager; #[phone]  Teams: from 8 am to 5 pm  Elisabet Nguyen MD

## 2023-12-08 NOTE — PROGRESS NOTE ADULT - SUBJECTIVE AND OBJECTIVE BOX
Transplant Surgery - Multidisciplinary Progress Note  --------------------------------------------------------------  DDRT 7/16/2023    Present: Patient seen and examined with multidisciplinary team including transplant surgeon: Dr. Simons, Nephrologist Dr. Corado, Fellow: Dr. Kitchen, Pharmacist Jorge Alberto Plunkett, Resident MICAH Cervantes, and unit RN during AM rounds. Disciplines not in attendance will be notified of the plan.      HPI: Tee Salvador is a 54 y/o M with past medical history significant for HTN, CHF (s/p AICD 2016--last interrogated 7/15/23), CAD (s/p CABG 2015), IDDM, PVD s/p stent x4 in RLE with R big toe/second toe amputation (2021) and ESRD on HD via LUE AVF for 6 years now s/p DDRT 7/16/2023 with Thymoglobulin induction. Post-transplant course complicated by severe constipation requiring disimpaction, DGF requiring hemodialysis and LUE swelling with concern for L subclavian thrombus on ELiquius, now s/p IR fistulogram 7/7/23 with balloon angioplasty of subclavian vein. He was discharged home on 7/27/23  presents to [facility] ED from wound care clinic on 12/6/23 after concern for osteomyelitis from clinic providers.     Interval Events:  -JAN/PVR was performed and reviewed  -Adup reviewed  - Podiatry planning for Debridement of L foot and R 3rd toe ampuation 12/11/23  -CMV not detected    Immunosuppression:  Envarsus 5 mg, Level 11.5  MMF held  Prednisone 10 mg daily    Potential Discharge date: TBD      MEDICATIONS  (STANDING):  apixaban 5 milliGRAM(s) Oral every 12 hours  aspirin enteric coated 81 milliGRAM(s) Oral daily  atorvastatin 40 milliGRAM(s) Oral at bedtime  carvedilol 3.125 milliGRAM(s) Oral every 12 hours  famotidine    Tablet 20 milliGRAM(s) Oral daily  insulin glargine Injectable (LANTUS) 17 Unit(s) SubCutaneous at bedtime  insulin lispro (ADMELOG) corrective regimen sliding scale   SubCutaneous at bedtime  insulin lispro (ADMELOG) corrective regimen sliding scale   SubCutaneous three times a day before meals  insulin lispro Injectable (ADMELOG) 10 Unit(s) SubCutaneous three times a day with meals  piperacillin/tazobactam IVPB.. 3.375 Gram(s) IV Intermittent every 8 hours  predniSONE   Tablet 10 milliGRAM(s) Oral daily  tacrolimus ER Tablet (ENVARSUS XR) 5 milliGRAM(s) Oral <User Schedule>  trimethoprim   80 mG/sulfamethoxazole 400 mG 1 Tablet(s) Oral daily  vancomycin  IVPB 1250 milliGRAM(s) IV Intermittent every 12 hours    MEDICATIONS  (PRN):      PAST MEDICAL & SURGICAL HISTORY:  Diabetes mellitus  type 2      Foot ulcer due to secondary DM      Coronary artery disease      Acute on chronic systolic heart failure      H/O kidney transplant      Breast Reduction  at age 18      Toe amputation status, left      S/P CABG (coronary artery bypass graft)      AICD (automatic cardioverter/defibrillator) present          Vital Signs Last 24 Hrs  T(C): 36.5 (08 Dec 2023 14:33), Max: 36.9 (07 Dec 2023 21:15)  T(F): 97.7 (08 Dec 2023 14:33), Max: 98.5 (07 Dec 2023 21:15)  HR: 79 (08 Dec 2023 14:33) (71 - 89)  BP: 137/69 (08 Dec 2023 14:33) (107/68 - 190/81)  BP(mean): --  RR: 18 (08 Dec 2023 14:33) (18 - 18)  SpO2: 100% (08 Dec 2023 14:33) (96% - 100%)    Parameters below as of 08 Dec 2023 14:33  Patient On (Oxygen Delivery Method): room air        I&O's Summary    07 Dec 2023 07:01  -  08 Dec 2023 07:00  --------------------------------------------------------  IN: 1100 mL / OUT: 1250 mL / NET: -150 mL    08 Dec 2023 07:01  -  08 Dec 2023 15:27  --------------------------------------------------------  IN: 180 mL / OUT: 0 mL / NET: 180 mL                              10.4   2.51  )-----------( 165      ( 08 Dec 2023 07:19 )             33.3     12-08    136  |  106  |  17  ----------------------------<  173<H>  4.5   |  24  |  1.15    Ca    10.0      08 Dec 2023 07:17  Phos  2.6     12-08  Mg     2.0     12-08      Tacrolimus (), Serum: 11.5 ng/mL (12-08 @ 07:23)        Culture - Abscess with Gram Stain (collected 12-06-23 @ 16:59)  Source: .Abscess left foot  Preliminary Report (12-07-23 @ 19:41):    Numerous Klebsiella pneumoniae    Numerous Pseudomonas aeruginosa    Numerous Enterococcus faecalis    Culture - Blood (collected 12-06-23 @ 13:08)  Source: .Blood Blood  Preliminary Report (12-07-23 @ 18:01):    No growth at 24 hours    Culture - Blood (collected 12-06-23 @ 12:40)  Source: .Blood Blood  Preliminary Report (12-07-23 @ 18:01):    No growth at 24 hours        Review of systems  Gen: No weight changes, fatigue, fevers/chills, weakness  Skin: No rashes  Head/Eyes/Ears/Mouth: No headache; Normal hearing; Normal vision w/o blurriness; No sinus pain/discomfort, sore throat  Respiratory: No dyspnea, cough, wheezing, hemoptysis  CV: No chest pain, PND, orthopnea  GI: no abdominal pain, denies diarrhea, constipation, nausea, vomiting, melena, hematochezia  : No increased frequency, dysuria, hematuria, nocturia  MSK: No joint pain/swelling; no back pain; no edema  Neuro: No dizziness/lightheadedness, weakness, seizures, numbness, tingling  Heme: No easy bruising or bleeding  Endo: No heat/cold intolerance  Psych: No significant nervousness, anxiety, stress, depression  All other systems were reviewed and are negative, except as noted.     PHYSICAL EXAM:  Constitutional: Well developed / well nourished  Eyes: Anicteric, PERRLA  ENMT: nc/at  Neck: supple  Respiratory: CTA B/L  Cardiovascular: RRR  Gastrointestinal: Soft, ND/NT   Genitourinary: Voiding spontaneously  Extremities: SCD's in place and working bilaterally  Vascular: Palpable dp pulses bilaterally, b/l LE foot wound dressing in place  Neurological: A&O x3  Skin: no rashes, ulcerations or lesions;  Musculoskeletal: Moving all extremities  Psychiatric: Responsive Transplant Surgery - Multidisciplinary Progress Note  --------------------------------------------------------------  DDRT 7/16/2023    Present: Patient seen and examined with multidisciplinary team including transplant surgeon: Dr. Simons, Nephrologist Dr. Corado, Fellow: Dr. Kitchen, Pharmacist Jorge Alberto Plunkett, Resident MICAH Cervantes, and unit RN during AM rounds. Disciplines not in attendance will be notified of the plan.      HPI: Tee Salvador is a 54 y/o M with past medical history significant for HTN, CHF (s/p AICD 2016--last interrogated 7/15/23), CAD (s/p CABG 2015), IDDM, PVD s/p stent x4 in RLE with R big toe/second toe amputation (2021) and ESRD on HD via LUE AVF for 6 years now s/p DDRT 7/16/2023 with Thymoglobulin induction. Post-transplant course complicated by severe constipation requiring disimpaction, DGF requiring hemodialysis and LUE swelling with concern for L subclavian thrombus on ELiquius, now s/p IR fistulogram 7/7/23 with balloon angioplasty of subclavian vein. He was discharged home on 7/27/23  presents to [facility] ED from wound care clinic on 12/6/23 after concern for osteomyelitis from clinic providers.     Interval Events:  -JAN/PVR was performed and reviewed  -Adup reviewed  - Podiatry planning for Debridement of L foot and R 3rd toe ampuation 12/11/23  -CMV not detected    Immunosuppression:  Envarsus 5 mg, Level 11.5  MMF held  Prednisone 10 mg daily    Potential Discharge date: TBD      MEDICATIONS  (STANDING):  apixaban 5 milliGRAM(s) Oral every 12 hours  aspirin enteric coated 81 milliGRAM(s) Oral daily  atorvastatin 40 milliGRAM(s) Oral at bedtime  carvedilol 3.125 milliGRAM(s) Oral every 12 hours  famotidine    Tablet 20 milliGRAM(s) Oral daily  insulin glargine Injectable (LANTUS) 17 Unit(s) SubCutaneous at bedtime  insulin lispro (ADMELOG) corrective regimen sliding scale   SubCutaneous at bedtime  insulin lispro (ADMELOG) corrective regimen sliding scale   SubCutaneous three times a day before meals  insulin lispro Injectable (ADMELOG) 10 Unit(s) SubCutaneous three times a day with meals  piperacillin/tazobactam IVPB.. 3.375 Gram(s) IV Intermittent every 8 hours  predniSONE   Tablet 10 milliGRAM(s) Oral daily  tacrolimus ER Tablet (ENVARSUS XR) 5 milliGRAM(s) Oral <User Schedule>  trimethoprim   80 mG/sulfamethoxazole 400 mG 1 Tablet(s) Oral daily  vancomycin  IVPB 1250 milliGRAM(s) IV Intermittent every 12 hours    MEDICATIONS  (PRN):      PAST MEDICAL & SURGICAL HISTORY:  Diabetes mellitus  type 2      Foot ulcer due to secondary DM      Coronary artery disease      Acute on chronic systolic heart failure      H/O kidney transplant      Breast Reduction  at age 18      Toe amputation status, left      S/P CABG (coronary artery bypass graft)      AICD (automatic cardioverter/defibrillator) present          Vital Signs Last 24 Hrs  T(C): 36.5 (08 Dec 2023 14:33), Max: 36.9 (07 Dec 2023 21:15)  T(F): 97.7 (08 Dec 2023 14:33), Max: 98.5 (07 Dec 2023 21:15)  HR: 79 (08 Dec 2023 14:33) (71 - 89)  BP: 137/69 (08 Dec 2023 14:33) (107/68 - 190/81)  BP(mean): --  RR: 18 (08 Dec 2023 14:33) (18 - 18)  SpO2: 100% (08 Dec 2023 14:33) (96% - 100%)    Parameters below as of 08 Dec 2023 14:33  Patient On (Oxygen Delivery Method): room air        I&O's Summary    07 Dec 2023 07:01  -  08 Dec 2023 07:00  --------------------------------------------------------  IN: 1100 mL / OUT: 1250 mL / NET: -150 mL    08 Dec 2023 07:01  -  08 Dec 2023 15:27  --------------------------------------------------------  IN: 180 mL / OUT: 0 mL / NET: 180 mL                              10.4   2.51  )-----------( 165      ( 08 Dec 2023 07:19 )             33.3     12-08    136  |  106  |  17  ----------------------------<  173<H>  4.5   |  24  |  1.15    Ca    10.0      08 Dec 2023 07:17  Phos  2.6     12-08  Mg     2.0     12-08      Tacrolimus (), Serum: 11.5 ng/mL (12-08 @ 07:23)        Culture - Abscess with Gram Stain (collected 12-06-23 @ 16:59)  Source: .Abscess left foot  Preliminary Report (12-07-23 @ 19:41):    Numerous Klebsiella pneumoniae    Numerous Pseudomonas aeruginosa    Numerous Enterococcus faecalis    Culture - Blood (collected 12-06-23 @ 13:08)  Source: .Blood Blood  Preliminary Report (12-07-23 @ 18:01):    No growth at 24 hours    Culture - Blood (collected 12-06-23 @ 12:40)  Source: .Blood Blood  Preliminary Report (12-07-23 @ 18:01):    No growth at 24 hours        Review of systems  Gen: No weight changes, fatigue, fevers/chills, weakness  Skin: No rashes  Head/Eyes/Ears/Mouth: No headache; Normal hearing; Normal vision w/o blurriness; No sinus pain/discomfort, sore throat  Respiratory: No dyspnea, cough, wheezing, hemoptysis  CV: No chest pain, PND, orthopnea  GI: no abdominal pain, denies diarrhea, constipation, nausea, vomiting, melena, hematochezia  : No increased frequency, dysuria, hematuria, nocturia  MSK: No joint pain/swelling; no back pain; no edema  Neuro: No dizziness/lightheadedness, weakness, seizures, numbness, tingling  Heme: No easy bruising or bleeding  Endo: No heat/cold intolerance  Psych: No significant nervousness, anxiety, stress, depression  All other systems were reviewed and are negative, except as noted.     PHYSICAL EXAM:  Constitutional: Well developed / well nourished  Eyes: Anicteric, PERRLA  ENMT: nc/at  Neck: supple  Respiratory: CTA B/L  Cardiovascular: RRR  Gastrointestinal: Soft, ND/NT   Genitourinary: Voiding spontaneously  Extremities: SCD's in place and working bilaterally  Vascular: Palpable dp pulses bilaterally, b/l LE foot wound dressing in place  Neurological: A&O x3  Skin: no rashes, ulcerations or lesions;  Musculoskeletal: Moving all extremities  Psychiatric: Responsive

## 2023-12-08 NOTE — PROCEDURE NOTE - INTERROGATION NOTE: COMMENTS
Battery longevity estimated at 5.7 years
Battery longevity estimated at 5.7 years
ICD Interrogation for MRI

## 2023-12-09 LAB
ANION GAP SERPL CALC-SCNC: 8 MMOL/L — SIGNIFICANT CHANGE UP (ref 5–17)
ANION GAP SERPL CALC-SCNC: 8 MMOL/L — SIGNIFICANT CHANGE UP (ref 5–17)
BASOPHILS # BLD AUTO: 0.04 K/UL — SIGNIFICANT CHANGE UP (ref 0–0.2)
BASOPHILS # BLD AUTO: 0.04 K/UL — SIGNIFICANT CHANGE UP (ref 0–0.2)
BASOPHILS NFR BLD AUTO: 1.4 % — SIGNIFICANT CHANGE UP (ref 0–2)
BASOPHILS NFR BLD AUTO: 1.4 % — SIGNIFICANT CHANGE UP (ref 0–2)
BLD GP AB SCN SERPL QL: NEGATIVE — SIGNIFICANT CHANGE UP
BLD GP AB SCN SERPL QL: NEGATIVE — SIGNIFICANT CHANGE UP
BUN SERPL-MCNC: 22 MG/DL — SIGNIFICANT CHANGE UP (ref 7–23)
BUN SERPL-MCNC: 22 MG/DL — SIGNIFICANT CHANGE UP (ref 7–23)
CALCIUM SERPL-MCNC: 10.2 MG/DL — SIGNIFICANT CHANGE UP (ref 8.4–10.5)
CALCIUM SERPL-MCNC: 10.2 MG/DL — SIGNIFICANT CHANGE UP (ref 8.4–10.5)
CHLORIDE SERPL-SCNC: 106 MMOL/L — SIGNIFICANT CHANGE UP (ref 96–108)
CHLORIDE SERPL-SCNC: 106 MMOL/L — SIGNIFICANT CHANGE UP (ref 96–108)
CO2 SERPL-SCNC: 22 MMOL/L — SIGNIFICANT CHANGE UP (ref 22–31)
CO2 SERPL-SCNC: 22 MMOL/L — SIGNIFICANT CHANGE UP (ref 22–31)
CREAT SERPL-MCNC: 1.21 MG/DL — SIGNIFICANT CHANGE UP (ref 0.5–1.3)
CREAT SERPL-MCNC: 1.21 MG/DL — SIGNIFICANT CHANGE UP (ref 0.5–1.3)
EGFR: 72 ML/MIN/1.73M2 — SIGNIFICANT CHANGE UP
EGFR: 72 ML/MIN/1.73M2 — SIGNIFICANT CHANGE UP
EOSINOPHIL # BLD AUTO: 0 K/UL — SIGNIFICANT CHANGE UP (ref 0–0.5)
EOSINOPHIL # BLD AUTO: 0 K/UL — SIGNIFICANT CHANGE UP (ref 0–0.5)
EOSINOPHIL NFR BLD AUTO: 0 % — SIGNIFICANT CHANGE UP (ref 0–6)
EOSINOPHIL NFR BLD AUTO: 0 % — SIGNIFICANT CHANGE UP (ref 0–6)
GLUCOSE BLDC GLUCOMTR-MCNC: 112 MG/DL — HIGH (ref 70–99)
GLUCOSE BLDC GLUCOMTR-MCNC: 112 MG/DL — HIGH (ref 70–99)
GLUCOSE BLDC GLUCOMTR-MCNC: 142 MG/DL — HIGH (ref 70–99)
GLUCOSE BLDC GLUCOMTR-MCNC: 142 MG/DL — HIGH (ref 70–99)
GLUCOSE BLDC GLUCOMTR-MCNC: 169 MG/DL — HIGH (ref 70–99)
GLUCOSE BLDC GLUCOMTR-MCNC: 169 MG/DL — HIGH (ref 70–99)
GLUCOSE BLDC GLUCOMTR-MCNC: 210 MG/DL — HIGH (ref 70–99)
GLUCOSE BLDC GLUCOMTR-MCNC: 210 MG/DL — HIGH (ref 70–99)
GLUCOSE SERPL-MCNC: 165 MG/DL — HIGH (ref 70–99)
GLUCOSE SERPL-MCNC: 165 MG/DL — HIGH (ref 70–99)
HCT VFR BLD CALC: 32.6 % — LOW (ref 39–50)
HCT VFR BLD CALC: 32.6 % — LOW (ref 39–50)
HGB BLD-MCNC: 10.1 G/DL — LOW (ref 13–17)
HGB BLD-MCNC: 10.1 G/DL — LOW (ref 13–17)
IMM GRANULOCYTES NFR BLD AUTO: 6.7 % — HIGH (ref 0–0.9)
IMM GRANULOCYTES NFR BLD AUTO: 6.7 % — HIGH (ref 0–0.9)
LYMPHOCYTES # BLD AUTO: 0.27 K/UL — LOW (ref 1–3.3)
LYMPHOCYTES # BLD AUTO: 0.27 K/UL — LOW (ref 1–3.3)
LYMPHOCYTES # BLD AUTO: 9.6 % — LOW (ref 13–44)
LYMPHOCYTES # BLD AUTO: 9.6 % — LOW (ref 13–44)
MAGNESIUM SERPL-MCNC: 1.8 MG/DL — SIGNIFICANT CHANGE UP (ref 1.6–2.6)
MAGNESIUM SERPL-MCNC: 1.8 MG/DL — SIGNIFICANT CHANGE UP (ref 1.6–2.6)
MCHC RBC-ENTMCNC: 28.1 PG — SIGNIFICANT CHANGE UP (ref 27–34)
MCHC RBC-ENTMCNC: 28.1 PG — SIGNIFICANT CHANGE UP (ref 27–34)
MCHC RBC-ENTMCNC: 31 GM/DL — LOW (ref 32–36)
MCHC RBC-ENTMCNC: 31 GM/DL — LOW (ref 32–36)
MCV RBC AUTO: 90.6 FL — SIGNIFICANT CHANGE UP (ref 80–100)
MCV RBC AUTO: 90.6 FL — SIGNIFICANT CHANGE UP (ref 80–100)
MONOCYTES # BLD AUTO: 0.43 K/UL — SIGNIFICANT CHANGE UP (ref 0–0.9)
MONOCYTES # BLD AUTO: 0.43 K/UL — SIGNIFICANT CHANGE UP (ref 0–0.9)
MONOCYTES NFR BLD AUTO: 15.2 % — HIGH (ref 2–14)
MONOCYTES NFR BLD AUTO: 15.2 % — HIGH (ref 2–14)
NEUTROPHILS # BLD AUTO: 1.89 K/UL — SIGNIFICANT CHANGE UP (ref 1.8–7.4)
NEUTROPHILS # BLD AUTO: 1.89 K/UL — SIGNIFICANT CHANGE UP (ref 1.8–7.4)
NEUTROPHILS NFR BLD AUTO: 67.1 % — SIGNIFICANT CHANGE UP (ref 43–77)
NEUTROPHILS NFR BLD AUTO: 67.1 % — SIGNIFICANT CHANGE UP (ref 43–77)
NRBC # BLD: 0 /100 WBCS — SIGNIFICANT CHANGE UP (ref 0–0)
NRBC # BLD: 0 /100 WBCS — SIGNIFICANT CHANGE UP (ref 0–0)
PHOSPHATE SERPL-MCNC: 2.6 MG/DL — SIGNIFICANT CHANGE UP (ref 2.5–4.5)
PHOSPHATE SERPL-MCNC: 2.6 MG/DL — SIGNIFICANT CHANGE UP (ref 2.5–4.5)
PLATELET # BLD AUTO: 164 K/UL — SIGNIFICANT CHANGE UP (ref 150–400)
PLATELET # BLD AUTO: 164 K/UL — SIGNIFICANT CHANGE UP (ref 150–400)
POTASSIUM SERPL-MCNC: 4.6 MMOL/L — SIGNIFICANT CHANGE UP (ref 3.5–5.3)
POTASSIUM SERPL-MCNC: 4.6 MMOL/L — SIGNIFICANT CHANGE UP (ref 3.5–5.3)
POTASSIUM SERPL-SCNC: 4.6 MMOL/L — SIGNIFICANT CHANGE UP (ref 3.5–5.3)
POTASSIUM SERPL-SCNC: 4.6 MMOL/L — SIGNIFICANT CHANGE UP (ref 3.5–5.3)
RBC # BLD: 3.6 M/UL — LOW (ref 4.2–5.8)
RBC # BLD: 3.6 M/UL — LOW (ref 4.2–5.8)
RBC # FLD: 12.9 % — SIGNIFICANT CHANGE UP (ref 10.3–14.5)
RBC # FLD: 12.9 % — SIGNIFICANT CHANGE UP (ref 10.3–14.5)
RH IG SCN BLD-IMP: POSITIVE — SIGNIFICANT CHANGE UP
RH IG SCN BLD-IMP: POSITIVE — SIGNIFICANT CHANGE UP
SODIUM SERPL-SCNC: 136 MMOL/L — SIGNIFICANT CHANGE UP (ref 135–145)
SODIUM SERPL-SCNC: 136 MMOL/L — SIGNIFICANT CHANGE UP (ref 135–145)
TACROLIMUS SERPL-MCNC: 19.4 NG/ML — SIGNIFICANT CHANGE UP
TACROLIMUS SERPL-MCNC: 19.4 NG/ML — SIGNIFICANT CHANGE UP
WBC # BLD: 2.82 K/UL — LOW (ref 3.8–10.5)
WBC # BLD: 2.82 K/UL — LOW (ref 3.8–10.5)
WBC # FLD AUTO: 2.82 K/UL — LOW (ref 3.8–10.5)
WBC # FLD AUTO: 2.82 K/UL — LOW (ref 3.8–10.5)

## 2023-12-09 PROCEDURE — 99232 SBSQ HOSP IP/OBS MODERATE 35: CPT

## 2023-12-09 PROCEDURE — 99233 SBSQ HOSP IP/OBS HIGH 50: CPT

## 2023-12-09 PROCEDURE — G0316 PROLONG INPT EVAL ADD15 M: CPT

## 2023-12-09 RX ORDER — SODIUM,POTASSIUM PHOSPHATES 278-250MG
1 POWDER IN PACKET (EA) ORAL
Refills: 0 | Status: COMPLETED | OUTPATIENT
Start: 2023-12-09 | End: 2023-12-09

## 2023-12-09 RX ORDER — MAGNESIUM OXIDE 400 MG ORAL TABLET 241.3 MG
800 TABLET ORAL ONCE
Refills: 0 | Status: COMPLETED | OUTPATIENT
Start: 2023-12-09 | End: 2023-12-09

## 2023-12-09 RX ORDER — MAGNESIUM SULFATE 500 MG/ML
1 VIAL (ML) INJECTION ONCE
Refills: 0 | Status: DISCONTINUED | OUTPATIENT
Start: 2023-12-09 | End: 2023-12-09

## 2023-12-09 RX ADMIN — Medication 166.67 MILLIGRAM(S): at 05:28

## 2023-12-09 RX ADMIN — PIPERACILLIN AND TAZOBACTAM 25 GRAM(S): 4; .5 INJECTION, POWDER, LYOPHILIZED, FOR SOLUTION INTRAVENOUS at 08:41

## 2023-12-09 RX ADMIN — TACROLIMUS 5 MILLIGRAM(S): 5 CAPSULE ORAL at 08:43

## 2023-12-09 RX ADMIN — Medication 2: at 08:43

## 2023-12-09 RX ADMIN — FAMOTIDINE 20 MILLIGRAM(S): 10 INJECTION INTRAVENOUS at 12:40

## 2023-12-09 RX ADMIN — Medication 1 TABLET(S): at 12:40

## 2023-12-09 RX ADMIN — MAGNESIUM OXIDE 400 MG ORAL TABLET 800 MILLIGRAM(S): 241.3 TABLET ORAL at 08:42

## 2023-12-09 RX ADMIN — Medication 4: at 12:40

## 2023-12-09 RX ADMIN — Medication 1 PACKET(S): at 18:33

## 2023-12-09 RX ADMIN — APIXABAN 5 MILLIGRAM(S): 2.5 TABLET, FILM COATED ORAL at 21:49

## 2023-12-09 RX ADMIN — Medication 1 PACKET(S): at 08:42

## 2023-12-09 RX ADMIN — CARVEDILOL PHOSPHATE 3.12 MILLIGRAM(S): 80 CAPSULE, EXTENDED RELEASE ORAL at 05:30

## 2023-12-09 RX ADMIN — Medication 10 UNIT(S): at 18:32

## 2023-12-09 RX ADMIN — Medication 10 UNIT(S): at 12:40

## 2023-12-09 RX ADMIN — CARVEDILOL PHOSPHATE 3.12 MILLIGRAM(S): 80 CAPSULE, EXTENDED RELEASE ORAL at 18:30

## 2023-12-09 RX ADMIN — Medication 81 MILLIGRAM(S): at 12:40

## 2023-12-09 RX ADMIN — Medication 10 MILLIGRAM(S): at 05:28

## 2023-12-09 RX ADMIN — PIPERACILLIN AND TAZOBACTAM 25 GRAM(S): 4; .5 INJECTION, POWDER, LYOPHILIZED, FOR SOLUTION INTRAVENOUS at 18:33

## 2023-12-09 RX ADMIN — INSULIN GLARGINE 10 UNIT(S): 100 INJECTION, SOLUTION SUBCUTANEOUS at 21:48

## 2023-12-09 RX ADMIN — ATORVASTATIN CALCIUM 40 MILLIGRAM(S): 80 TABLET, FILM COATED ORAL at 21:48

## 2023-12-09 RX ADMIN — APIXABAN 5 MILLIGRAM(S): 2.5 TABLET, FILM COATED ORAL at 08:42

## 2023-12-09 RX ADMIN — Medication 166.67 MILLIGRAM(S): at 18:33

## 2023-12-09 NOTE — PROGRESS NOTE ADULT - ASSESSMENT
53M with PMH of HTN, HLD, DM2, CAD s/p CABG, HF s/p AICD, ESRD on HD since 2016 s/p DDRT (7/14/23) complicated by DGF, and toe amputations due to PVD/osteo, now presenting to the ED with concern for toe infection. Nephrology service consulted for management of immunosuppression of transplanted kidney.       1. s/p DDRT on 7/14/23 - Allograft function is excellent.   2. Immunosuppression - Initially received Simulect induction but changed to Thymoglobulin given delayed graft function.Envarsus with goal 8-10,  Cellcept on hold due to OM , Ppx with Valcyte and Bactrim.  3. Osteomyelitis - Managed by Podiatry, Vascular, and Transplant ID.  f/u with vascular.   4. HTN - BP at goal.   5. DM2- On lantus and premeal insulin

## 2023-12-09 NOTE — CHART NOTE - NSCHARTNOTEFT_GEN_A_CORE
Wound Care Team Note:    Request for wound care consult for foot/toe wound received and referred to podiatry. Will defer to podiatry for management. Please contact Podiatry for questions/concerns. Will not follow.    Clare Mistry NP-C, CWOCN via TEAMS
ASSESSMENT: 54 y/o M with PMH of HTN, CHF (s/p AICD 2016), CAD (s/p CABG 2015), IDDM, PVD s/p stent x4 in RLE with R big toe/second toe amputation (2021) and ESRD on HD via LUE AVF for 6 years now s/p DDRT 7/16/2023 presenting to the ED with infected R 3rd toe and left 1st metatarsal ulcers. Patient follows with Dr. Malone of Vascular Surgery. Patient states he has had these wounds for a while, follows with Podiatry. However, states that over the past few weeks, they have begun to look worse, specifically the left 1st metatarsal ulcer. Vascular surgery consulted for evaluation.    Discussed plan with attending Dr. Malone. Both Dr. Malone and podiatry attending are aware of all the present findings (JAN/PVR, Duplex, and MRI). Based on discussion between the podiatry team and the patient for the type and laterality of future intervention, will plan for MR angiography with gadolinium contrast.     Vascular  [id number]

## 2023-12-09 NOTE — PROGRESS NOTE ADULT - SUBJECTIVE AND OBJECTIVE BOX
Subjective: Patient seen and examined. No new events except as noted.     SUBJECTIVE/ROS:  No chest pain, dyspnea, palpitation, or dizziness.       MEDICATIONS:  MEDICATIONS  (STANDING):  apixaban 5 milliGRAM(s) Oral every 12 hours  aspirin enteric coated 81 milliGRAM(s) Oral daily  atorvastatin 40 milliGRAM(s) Oral at bedtime  carvedilol 3.125 milliGRAM(s) Oral every 12 hours  famotidine    Tablet 20 milliGRAM(s) Oral daily  insulin glargine Injectable (LANTUS) 10 Unit(s) SubCutaneous at bedtime  insulin lispro (ADMELOG) corrective regimen sliding scale   SubCutaneous at bedtime  insulin lispro (ADMELOG) corrective regimen sliding scale   SubCutaneous three times a day before meals  insulin lispro Injectable (ADMELOG) 10 Unit(s) SubCutaneous three times a day with meals  magnesium sulfate  IVPB 1 Gram(s) IV Intermittent once  piperacillin/tazobactam IVPB.. 3.375 Gram(s) IV Intermittent every 8 hours  potassium phosphate / sodium phosphate Powder (PHOS-NaK) 1 Packet(s) Oral two times a day with meals  predniSONE   Tablet 10 milliGRAM(s) Oral daily  tacrolimus ER Tablet (ENVARSUS XR) 5 milliGRAM(s) Oral <User Schedule>  trimethoprim   80 mG/sulfamethoxazole 400 mG 1 Tablet(s) Oral daily  vancomycin  IVPB 1250 milliGRAM(s) IV Intermittent every 12 hours      PHYSICAL EXAM:  T(C): 36.8 (12-09-23 @ 01:07), Max: 37.2 (12-08-23 @ 21:36)  HR: 76 (12-09-23 @ 05:30) (68 - 81)  BP: 139/77 (12-09-23 @ 05:30) (137/69 - 191/69)  RR: 18 (12-09-23 @ 05:30) (18 - 18)  SpO2: 100% (12-09-23 @ 05:30) (98% - 100%)  Wt(kg): --  I&O's Summary    08 Dec 2023 07:01  -  09 Dec 2023 07:00  --------------------------------------------------------  IN: 1020 mL / OUT: 500 mL / NET: 520 mL            JVP: Normal  Neck: supple  Lung: clear   CV: S1 S2 , Murmur:  Abd: soft  Ext: No edema  neuro: Awake / alert  Psych: flat affect  Skin: foot ulcer    LABS/DATA:    CARDIAC MARKERS:                                10.1   2.82  )-----------( 164      ( 09 Dec 2023 05:46 )             32.6     12-09    136  |  106  |  22  ----------------------------<  165<H>  4.6   |  22  |  1.21    Ca    10.2      09 Dec 2023 05:45  Phos  2.6     12-09  Mg     1.8     12-09      proBNP:   Lipid Profile:   HgA1c:   TSH:     TELE:  EKG:

## 2023-12-09 NOTE — PROGRESS NOTE ADULT - SUBJECTIVE AND OBJECTIVE BOX
[facility] DIVISION OF KIDNEY DISEASES AND HYPERTENSION -- FOLLOW UP NOTE  --------------------------------------------------------------------------------  Chief Complaint:    24 hour events/subjective:  Patient was seen and examined at bedside  no overnight events     PAST HISTORY  --------------------------------------------------------------------------------  No significant changes to PMH, PSH, FHx, SHx, unless otherwise noted    ALLERGIES & MEDICATIONS  --------------------------------------------------------------------------------  Allergies    Contrast. (Unknown)    Intolerances      Standing Inpatient Medications  apixaban 5 milliGRAM(s) Oral every 12 hours  aspirin enteric coated 81 milliGRAM(s) Oral daily  atorvastatin 40 milliGRAM(s) Oral at bedtime  carvedilol 3.125 milliGRAM(s) Oral every 12 hours  famotidine    Tablet 20 milliGRAM(s) Oral daily  insulin glargine Injectable (LANTUS) 10 Unit(s) SubCutaneous at bedtime  insulin lispro (ADMELOG) corrective regimen sliding scale   SubCutaneous at bedtime  insulin lispro (ADMELOG) corrective regimen sliding scale   SubCutaneous three times a day before meals  insulin lispro Injectable (ADMELOG) 10 Unit(s) SubCutaneous three times a day with meals  piperacillin/tazobactam IVPB.. 3.375 Gram(s) IV Intermittent every 8 hours  potassium phosphate / sodium phosphate Powder (PHOS-NaK) 1 Packet(s) Oral two times a day with meals  predniSONE   Tablet 10 milliGRAM(s) Oral daily  tacrolimus ER Tablet (ENVARSUS XR) 5 milliGRAM(s) Oral <User Schedule>  trimethoprim   80 mG/sulfamethoxazole 400 mG 1 Tablet(s) Oral daily  vancomycin  IVPB 1250 milliGRAM(s) IV Intermittent every 12 hours    PRN Inpatient Medications      REVIEW OF SYSTEMS  --------------------------------------------------------------------------------  Gen: No fatigue, fevers/chills, weakness  Skin: No rashes  Head/Eyes/Ears/Mouth: No headache;No sore throat  Respiratory: No dyspnea, cough,   CV: No chest pain, PND, orthopnea  GI: No abdominal pain, diarrhea, constipation, nausea, vomiting  Transplant: No pain  : No increased frequency, dysuria, hematuria, nocturia  MSK: No joint pain/swelling; no back pain; no edema  Neuro: No dizziness/lightheadedness, weakness, seizures, numbness, tingling  Psych: No significant nervousness, anxiety, stress, depression    All other systems were reviewed and are negative, except as noted.    VITALS/PHYSICAL EXAM  --------------------------------------------------------------------------------  T(C): 36.6 (12-09-23 @ 08:46), Max: 37.2 (12-08-23 @ 21:36)  HR: 81 (12-09-23 @ 08:46) (68 - 81)  BP: 127/79 (12-09-23 @ 08:46) (127/79 - 191/69)  RR: 18 (12-09-23 @ 08:46) (18 - 18)  SpO2: 99% (12-09-23 @ 08:46) (98% - 100%)  Wt(kg): --        12-08-23 @ 07:01  -  12-09-23 @ 07:00  --------------------------------------------------------  IN: 1020 mL / OUT: 500 mL / NET: 520 mL    12-09-23 @ 07:01  -  12-09-23 @ 09:43  --------------------------------------------------------  IN: 0 mL / OUT: 325 mL / NET: -325 mL      Physical Exam:  	Gen: NAD  	HEENT: PERRL, supple neck, clear oropharynx  	Pulm: CTA B/L  	CV: RRR, S1S2; no rub  	Back: No spinal or CVA tenderness; no sacral edema  	Abd: +BS, soft, nontender/nondistended                      Transplant: No tenderness, swelling  	: No suprapubic tenderness  	UE: Warm, FROM; no edema; no asterixis  	LE: Warm, FROM; no edema  	Neuro: No focal deficits  	Psych: Normal affect and mood  	Skin: Warm, without rashes      LABS/STUDIES  --------------------------------------------------------------------------------              10.1   2.82  >-----------<  164      [12-09-23 @ 05:46]              32.6     136  |  106  |  22  ----------------------------<  165      [12-09-23 @ 05:45]  4.6   |  22  |  1.21        Ca     10.2     [12-09-23 @ 05:45]      Mg     1.8     [12-09-23 @ 05:45]      Phos  2.6     [12-09-23 @ 05:45]        Creatinine Trend:  SCr 1.21 [12-09 @ 05:45]  SCr 1.15 [12-08 @ 07:17]  SCr 1.07 [12-07 @ 06:59]  SCr 1.00 [12-06 @ 13:21]    Tacrolimus (), Serum: 11.5 ng/mL (12-08 @ 07:23)  Tacrolimus (), Serum: 7.4 ng/mL (12-07 @ 07:01)  Tacrolimus (), Serum: 9.0 ng/mL (12-06 @ 13:21)          Urinalysis - [12-09-23 @ 05:45]      Color  / Appearance  / SG  / pH       Gluc 165 / Ketone   / Bili  / Urobili        Blood  / Protein  / Leuk Est  / Nitrite       RBC  / WBC  / Hyaline  / Gran  / Sq Epi  / Non Sq Epi  / Bacteria       Iron 68, TIBC 185, %sat 37      [08-16-23 @ 06:39]  Ferritin 1010      [08-16-23 @ 06:39]  PTH -- (Ca 10.6)      [11-02-23 @ 11:56]   220  Vitamin D (25OH) 18.0      [11-02-23 @ 11:56]         [facility] DIVISION OF KIDNEY DISEASES AND HYPERTENSION -- FOLLOW UP NOTE  --------------------------------------------------------------------------------  Chief Complaint:    24 hour events/subjective:  Patient was seen and examined at bedside  no overnight events     PAST HISTORY  --------------------------------------------------------------------------------  No significant changes to PMH, PSH, FHx, SHx, unless otherwise noted    ALLERGIES & MEDICATIONS  --------------------------------------------------------------------------------  Allergies    Contrast. (Unknown)    Intolerances      Standing Inpatient Medications  apixaban 5 milliGRAM(s) Oral every 12 hours  aspirin enteric coated 81 milliGRAM(s) Oral daily  atorvastatin 40 milliGRAM(s) Oral at bedtime  carvedilol 3.125 milliGRAM(s) Oral every 12 hours  famotidine    Tablet 20 milliGRAM(s) Oral daily  insulin glargine Injectable (LANTUS) 10 Unit(s) SubCutaneous at bedtime  insulin lispro (ADMELOG) corrective regimen sliding scale   SubCutaneous at bedtime  insulin lispro (ADMELOG) corrective regimen sliding scale   SubCutaneous three times a day before meals  insulin lispro Injectable (ADMELOG) 10 Unit(s) SubCutaneous three times a day with meals  piperacillin/tazobactam IVPB.. 3.375 Gram(s) IV Intermittent every 8 hours  potassium phosphate / sodium phosphate Powder (PHOS-NaK) 1 Packet(s) Oral two times a day with meals  predniSONE   Tablet 10 milliGRAM(s) Oral daily  tacrolimus ER Tablet (ENVARSUS XR) 5 milliGRAM(s) Oral <User Schedule>  trimethoprim   80 mG/sulfamethoxazole 400 mG 1 Tablet(s) Oral daily  vancomycin  IVPB 1250 milliGRAM(s) IV Intermittent every 12 hours    PRN Inpatient Medications      REVIEW OF SYSTEMS  --------------------------------------------------------------------------------  Gen: No fatigue, fevers/chills, weakness  Skin: No rashes  Head/Eyes/Ears/Mouth: No headache;No sore throat  Respiratory: No dyspnea, cough,   CV: No chest pain, PND, orthopnea  GI: No abdominal pain, diarrhea, constipation, nausea, vomiting  Transplant: No pain  : No increased frequency, dysuria, hematuria, nocturia  MSK: No joint pain/swelling; no back pain; no edema  Neuro: No dizziness/lightheadedness, weakness, seizures, numbness, tingling  Psych: No significant nervousness, anxiety, stress, depression    All other systems were reviewed and are negative, except as noted.    VITALS/PHYSICAL EXAM  --------------------------------------------------------------------------------  T(C): 36.6 (12-09-23 @ 08:46), Max: 37.2 (12-08-23 @ 21:36)  HR: 81 (12-09-23 @ 08:46) (68 - 81)  BP: 127/79 (12-09-23 @ 08:46) (127/79 - 191/69)  RR: 18 (12-09-23 @ 08:46) (18 - 18)  SpO2: 99% (12-09-23 @ 08:46) (98% - 100%)  Wt(kg): --        12-08-23 @ 07:01  -  12-09-23 @ 07:00  --------------------------------------------------------  IN: 1020 mL / OUT: 500 mL / NET: 520 mL    12-09-23 @ 07:01  -  12-09-23 @ 09:43  --------------------------------------------------------  IN: 0 mL / OUT: 325 mL / NET: -325 mL      Physical Exam:  	Gen: NAD  	HEENT: PERRL, supple neck, clear oropharynx  	Pulm: CTA B/L  	CV: RRR, S1S2; no rub  	Back: No spinal or CVA tenderness; no sacral edema  	Abd: +BS, soft, nontender/nondistended                      Transplant: No tenderness, swelling  	: No suprapubic tenderness  	UE: Warm, FROM; no edema; no asterixis  	LE: Warm, FROM; no edema  	Neuro: No focal deficits  	Psych: Normal affect and mood  	Skin: Warm, without rashes      LABS/STUDIES  --------------------------------------------------------------------------------              10.1   2.82  >-----------<  164      [12-09-23 @ 05:46]              32.6     136  |  106  |  22  ----------------------------<  165      [12-09-23 @ 05:45]  4.6   |  22  |  1.21        Ca     10.2     [12-09-23 @ 05:45]      Mg     1.8     [12-09-23 @ 05:45]      Phos  2.6     [12-09-23 @ 05:45]        Creatinine Trend:  SCr 1.21 [12-09 @ 05:45]  SCr 1.15 [12-08 @ 07:17]  SCr 1.07 [12-07 @ 06:59]  SCr 1.00 [12-06 @ 13:21]    Tacrolimus (), Serum: 11.5 ng/mL (12-08 @ 07:23)  Tacrolimus (), Serum: 7.4 ng/mL (12-07 @ 07:01)  Tacrolimus (), Serum: 9.0 ng/mL (12-06 @ 13:21)          Urinalysis - [12-09-23 @ 05:45]      Color  / Appearance  / SG  / pH       Gluc 165 / Ketone   / Bili  / Urobili        Blood  / Protein  / Leuk Est  / Nitrite       RBC  / WBC  / Hyaline  / Gran  / Sq Epi  / Non Sq Epi  / Bacteria       Iron 68, TIBC 185, %sat 37      [08-16-23 @ 06:39]  Ferritin 1010      [08-16-23 @ 06:39]  PTH -- (Ca 10.6)      [11-02-23 @ 11:56]   220  Vitamin D (25OH) 18.0      [11-02-23 @ 11:56]

## 2023-12-09 NOTE — PROGRESS NOTE ADULT - SUBJECTIVE AND OBJECTIVE BOX
Transplant Surgery - Multidisciplinary Progress Note  --------------------------------------------------------------  DDRT 7/16/2023    Present: Patient seen and examined with multidisciplinary Transplant team including: Surgeon: Dr. Simons, Nephrologist: Dr. Mickie BAI, KEYLA Osorio, bedside RN during AM rounds.   Disciplines not in attendance will be notified of the plan.        HPI: Tee Salvador is a 52 y/o M with past medical history significant for HTN, CHF (s/p AICD 2016--last interrogated 7/15/23), CAD (s/p CABG 2015), IDDM, PVD s/p stent x4 in RLE with R big toe/second toe amputation (2021) and ESRD on HD via LUE AVF for 6 years now s/p DDRT 7/16/2023 with Thymoglobulin induction. Post-transplant course complicated by severe constipation requiring disimpaction, DGF requiring hemodialysis and LUE swelling with concern for L subclavian thrombus on ELiquius, now s/p IR fistulogram 7/7/23 with balloon angioplasty of subclavian vein. He was discharged home on 7/27/23  presents to [facility] ED from wound care clinic on 12/6/23 after concern for osteomyelitis from clinic providers.     Interval Events:  -JAN/PVR was performed and reviewed  -Adup reviewed  - Podiatry planning for Debridement of L foot and R 3rd toe ampuation 12/11/23  -CMV not detected    Immunosuppression:  Envarsus 5 mg, Level 11.5  MMF held  Prednisone 10 mg daily    Potential Discharge date: TBD       MEDICATIONS  (STANDING):  apixaban 5 milliGRAM(s) Oral every 12 hours  aspirin enteric coated 81 milliGRAM(s) Oral daily  atorvastatin 40 milliGRAM(s) Oral at bedtime  carvedilol 3.125 milliGRAM(s) Oral every 12 hours  famotidine    Tablet 20 milliGRAM(s) Oral daily  insulin glargine Injectable (LANTUS) 10 Unit(s) SubCutaneous at bedtime  insulin lispro (ADMELOG) corrective regimen sliding scale   SubCutaneous at bedtime  insulin lispro (ADMELOG) corrective regimen sliding scale   SubCutaneous three times a day before meals  insulin lispro Injectable (ADMELOG) 10 Unit(s) SubCutaneous three times a day with meals  piperacillin/tazobactam IVPB.. 3.375 Gram(s) IV Intermittent every 8 hours  potassium phosphate / sodium phosphate Powder (PHOS-NaK) 1 Packet(s) Oral two times a day with meals  predniSONE   Tablet 10 milliGRAM(s) Oral daily  trimethoprim   80 mG/sulfamethoxazole 400 mG 1 Tablet(s) Oral daily  vancomycin  IVPB 1250 milliGRAM(s) IV Intermittent every 12 hours    MEDICATIONS  (PRN):      PAST MEDICAL & SURGICAL HISTORY:  Diabetes mellitus  type 2      Foot ulcer due to secondary DM      Coronary artery disease      Acute on chronic systolic heart failure      H/O kidney transplant      Breast Reduction  at age 18      Toe amputation status, left      S/P CABG (coronary artery bypass graft)      AICD (automatic cardioverter/defibrillator) present          Vital Signs Last 24 Hrs  T(C): 36.7 (09 Dec 2023 17:00), Max: 37.2 (08 Dec 2023 21:36)  T(F): 98.1 (09 Dec 2023 17:00), Max: 98.9 (08 Dec 2023 21:36)  HR: 77 (09 Dec 2023 17:00) (68 - 81)  BP: 163/81 (09 Dec 2023 17:00) (127/79 - 191/69)  BP(mean): --  RR: 18 (09 Dec 2023 17:00) (18 - 18)  SpO2: 99% (09 Dec 2023 17:00) (98% - 100%)    Parameters below as of 09 Dec 2023 17:00  Patient On (Oxygen Delivery Method): room air        I&O's Summary    08 Dec 2023 07:01  -  09 Dec 2023 07:00  --------------------------------------------------------  IN: 1020 mL / OUT: 500 mL / NET: 520 mL    09 Dec 2023 07:01  -  09 Dec 2023 17:56  --------------------------------------------------------  IN: 240 mL / OUT: 575 mL / NET: -335 mL                              10.1   2.82  )-----------( 164      ( 09 Dec 2023 05:46 )             32.6     12-09    136  |  106  |  22  ----------------------------<  165<H>  4.6   |  22  |  1.21    Ca    10.2      09 Dec 2023 05:45  Phos  2.6     12-09  Mg     1.8     12-09      Tacrolimus (), Serum: 19.4 ng/mL (12-09 @ 05:46)        Culture - Abscess with Gram Stain (collected 12-06-23 @ 16:59)  Source: .Abscess left foot  Preliminary Report (12-08-23 @ 19:58):    Numerous Klebsiella pneumoniae ESBL    Numerous Pseudomonas aeruginosa    Numerous Enterococcus faecalis Susceptibility to follow.    Normal skin linsey isolated  Organism: Klebsiella pneumoniae ESBL  Pseudomonas aeruginosa (12-08-23 @ 18:51)  Organism: Pseudomonas aeruginosa (12-08-23 @ 18:51)  Organism: Klebsiella pneumoniae ESBL (12-08-23 @ 18:51)    Culture - Blood (collected 12-06-23 @ 13:08)  Source: .Blood Blood  Preliminary Report (12-08-23 @ 18:01):    No growth at 48 Hours    Culture - Blood (collected 12-06-23 @ 12:40)  Source: .Blood Blood  Preliminary Report (12-08-23 @ 18:01):    No growth at 48 Hours                  Review of systems  Gen: No weight changes, fatigue, fevers/chills, weakness  Skin: No rashes  Head/Eyes/Ears/Mouth: No headache; Normal hearing; Normal vision w/o blurriness; No sinus pain/discomfort, sore throat  Respiratory: No dyspnea, cough, wheezing, hemoptysis  CV: No chest pain, PND, orthopnea  GI: no abdominal pain, denies diarrhea, constipation, nausea, vomiting, melena, hematochezia  : No increased frequency, dysuria, hematuria, nocturia  MSK: No joint pain/swelling; no back pain; no edema  Neuro: No dizziness/lightheadedness, weakness, seizures, numbness, tingling  Heme: No easy bruising or bleeding  Endo: No heat/cold intolerance  Psych: No significant nervousness, anxiety, stress, depression  All other systems were reviewed and are negative, except as noted.     PHYSICAL EXAM:  Constitutional: Well developed / well nourished  Eyes: Anicteric, PERRLA  ENMT: nc/at  Neck: supple  Respiratory: CTA B/L  Cardiovascular: RRR  Gastrointestinal: Soft, ND/NT   Genitourinary: Voiding spontaneously  Extremities: SCD's in place and working bilaterally  Vascular: Palpable dp pulses bilaterally, b/l LE foot wound dressing in place  Neurological: A&O x3  Skin: no rashes, ulcerations or lesions;  Musculoskeletal: Moving all extremities  Psychiatric: Responsive Transplant Surgery - Multidisciplinary Progress Note  --------------------------------------------------------------  DDRT 7/16/2023    Present: Patient seen and examined with multidisciplinary Transplant team including: Surgeon: Dr. Simons, Nephrologist: Dr. Mickie BAI, KEYLA Osorio, bedside RN during AM rounds.   Disciplines not in attendance will be notified of the plan.        HPI: Tee Salvador is a 52 y/o M with past medical history significant for HTN, CHF (s/p AICD 2016--last interrogated 7/15/23), CAD (s/p CABG 2015), IDDM, PVD s/p stent x4 in RLE with R big toe/second toe amputation (2021) and ESRD on HD via LUE AVF for 6 years now s/p DDRT 7/16/2023 with Thymoglobulin induction. Post-transplant course complicated by severe constipation requiring disimpaction, DGF requiring hemodialysis and LUE swelling with concern for L subclavian thrombus on ELiquius, now s/p IR fistulogram 7/7/23 with balloon angioplasty of subclavian vein. He was discharged home on 7/27/23  presents to [facility] ED from wound care clinic on 12/6/23 after concern for osteomyelitis from clinic providers.     Interval Events:  -JAN/PVR was performed and reviewed  -Adup reviewed  - Podiatry planning for Debridement of L foot and R 3rd toe ampuation 12/11/23  -CMV not detected    Immunosuppression:  Envarsus 5 mg, Level 11.5  MMF held  Prednisone 10 mg daily    Potential Discharge date: TBD       MEDICATIONS  (STANDING):  apixaban 5 milliGRAM(s) Oral every 12 hours  aspirin enteric coated 81 milliGRAM(s) Oral daily  atorvastatin 40 milliGRAM(s) Oral at bedtime  carvedilol 3.125 milliGRAM(s) Oral every 12 hours  famotidine    Tablet 20 milliGRAM(s) Oral daily  insulin glargine Injectable (LANTUS) 10 Unit(s) SubCutaneous at bedtime  insulin lispro (ADMELOG) corrective regimen sliding scale   SubCutaneous at bedtime  insulin lispro (ADMELOG) corrective regimen sliding scale   SubCutaneous three times a day before meals  insulin lispro Injectable (ADMELOG) 10 Unit(s) SubCutaneous three times a day with meals  piperacillin/tazobactam IVPB.. 3.375 Gram(s) IV Intermittent every 8 hours  potassium phosphate / sodium phosphate Powder (PHOS-NaK) 1 Packet(s) Oral two times a day with meals  predniSONE   Tablet 10 milliGRAM(s) Oral daily  trimethoprim   80 mG/sulfamethoxazole 400 mG 1 Tablet(s) Oral daily  vancomycin  IVPB 1250 milliGRAM(s) IV Intermittent every 12 hours    MEDICATIONS  (PRN):      PAST MEDICAL & SURGICAL HISTORY:  Diabetes mellitus  type 2      Foot ulcer due to secondary DM      Coronary artery disease      Acute on chronic systolic heart failure      H/O kidney transplant      Breast Reduction  at age 18      Toe amputation status, left      S/P CABG (coronary artery bypass graft)      AICD (automatic cardioverter/defibrillator) present          Vital Signs Last 24 Hrs  T(C): 36.7 (09 Dec 2023 17:00), Max: 37.2 (08 Dec 2023 21:36)  T(F): 98.1 (09 Dec 2023 17:00), Max: 98.9 (08 Dec 2023 21:36)  HR: 77 (09 Dec 2023 17:00) (68 - 81)  BP: 163/81 (09 Dec 2023 17:00) (127/79 - 191/69)  BP(mean): --  RR: 18 (09 Dec 2023 17:00) (18 - 18)  SpO2: 99% (09 Dec 2023 17:00) (98% - 100%)    Parameters below as of 09 Dec 2023 17:00  Patient On (Oxygen Delivery Method): room air        I&O's Summary    08 Dec 2023 07:01  -  09 Dec 2023 07:00  --------------------------------------------------------  IN: 1020 mL / OUT: 500 mL / NET: 520 mL    09 Dec 2023 07:01  -  09 Dec 2023 17:56  --------------------------------------------------------  IN: 240 mL / OUT: 575 mL / NET: -335 mL                              10.1   2.82  )-----------( 164      ( 09 Dec 2023 05:46 )             32.6     12-09    136  |  106  |  22  ----------------------------<  165<H>  4.6   |  22  |  1.21    Ca    10.2      09 Dec 2023 05:45  Phos  2.6     12-09  Mg     1.8     12-09      Tacrolimus (), Serum: 19.4 ng/mL (12-09 @ 05:46)        Culture - Abscess with Gram Stain (collected 12-06-23 @ 16:59)  Source: .Abscess left foot  Preliminary Report (12-08-23 @ 19:58):    Numerous Klebsiella pneumoniae ESBL    Numerous Pseudomonas aeruginosa    Numerous Enterococcus faecalis Susceptibility to follow.    Normal skin linsey isolated  Organism: Klebsiella pneumoniae ESBL  Pseudomonas aeruginosa (12-08-23 @ 18:51)  Organism: Pseudomonas aeruginosa (12-08-23 @ 18:51)  Organism: Klebsiella pneumoniae ESBL (12-08-23 @ 18:51)    Culture - Blood (collected 12-06-23 @ 13:08)  Source: .Blood Blood  Preliminary Report (12-08-23 @ 18:01):    No growth at 48 Hours    Culture - Blood (collected 12-06-23 @ 12:40)  Source: .Blood Blood  Preliminary Report (12-08-23 @ 18:01):    No growth at 48 Hours                  Review of systems  Gen: No weight changes, fatigue, fevers/chills, weakness  Skin: No rashes  Head/Eyes/Ears/Mouth: No headache; Normal hearing; Normal vision w/o blurriness; No sinus pain/discomfort, sore throat  Respiratory: No dyspnea, cough, wheezing, hemoptysis  CV: No chest pain, PND, orthopnea  GI: no abdominal pain, denies diarrhea, constipation, nausea, vomiting, melena, hematochezia  : No increased frequency, dysuria, hematuria, nocturia  MSK: No joint pain/swelling; no back pain; no edema  Neuro: No dizziness/lightheadedness, weakness, seizures, numbness, tingling  Heme: No easy bruising or bleeding  Endo: No heat/cold intolerance  Psych: No significant nervousness, anxiety, stress, depression  All other systems were reviewed and are negative, except as noted.     PHYSICAL EXAM:  Constitutional: Well developed / well nourished  Eyes: Anicteric, PERRLA  ENMT: nc/at  Neck: supple  Respiratory: CTA B/L  Cardiovascular: RRR  Gastrointestinal: Soft, ND/NT   Genitourinary: Voiding spontaneously  Extremities: SCD's in place and working bilaterally  Vascular: Palpable dp pulses bilaterally, b/l LE foot wound dressing in place  Neurological: A&O x3  Skin: no rashes, ulcerations or lesions;  Musculoskeletal: Moving all extremities  Psychiatric: Responsive

## 2023-12-09 NOTE — PROGRESS NOTE ADULT - ASSESSMENT
52 y/o M with past medical history significant for IDDM, CAD s/p CABG x4v (2015) s/p AICD (2016), PVD (4 stents in RLE), HTN, HLD and ESRD previously on HD via LUE AVF (ligated 9/8/23) now s/p DDRT 7/16/2023 with Simulect -> Thymoglobulin induction. His post-transplant course was complicated by DGF and subtly AMR with +DSAs s/p PLEX/IVIG (8/2023) and L subclavian thrombus remains on Eliquis presents to [facility] ED from wound care clinic on 12/6/23 after concern for osteomyelitis from clinic providers.     [ ] c/f bilateral foot osteomyelitis  - Vascular andpodiatry consulted reccs appreciated  - Pod: planning for OR Mon for R 3rd toe amputation/L foot debridement (card/Med) clearence done, as per them cont Eliquis and ASA    - Vascular: #[id number] will plan kim if still need MR angiography with gadolinium contrast or not   - XR concerning for osteomyelitis of both feet  - Transplant ID reccs appreciated   - MRI R/L foot: shows osteomyelitises   - cont Zosyn/Vanco for empiric coverage   -vanco trough 14.5   - Follow up blood cultures sent in ED     [ ] s/p DDRT 7/16/2023   - Baseline Cr ~1.0   - Immuno: ENv hold for supertheraputic level, MMF HELD d/t c/f for osteomyelitis , Pred 10   - PPx: Bactrim, Pepcid   - Leukopenia: Daily CBC w/ diff, holding Valcyte,       [ ] IDDM  - Continue Lantus/Lispro home dose     [ ] CHF, CAD s/p CABG, L SC DVT   - Continue Eliquis, ASA 81   - Continue Coreg, Lipitor          52 y/o M with past medical history significant for IDDM, CAD s/p CABG x4v (2015) s/p AICD (2016), PVD (4 stents in RLE), HTN, HLD and ESRD previously on HD via LUE AVF (ligated 9/8/23) now s/p DDRT 7/16/2023 with Simulect -> Thymoglobulin induction. His post-transplant course was complicated by DGF and subtly AMR with +DSAs s/p PLEX/IVIG (8/2023) and L subclavian thrombus remains on Eliquis presents to [facility] ED from wound care clinic on 12/6/23 after concern for osteomyelitis from clinic providers.     [ ] c/f bilateral foot osteomyelitis  - Vascular andpodiatry consulted reccs appreciated  - Pod: planning for OR Mon for R 3rd toe amputation/L foot debridement (card/Med) clearence done, as per them cont Eliquis and ASA    - Vascular: #[id number] will plan kim if still need MR angiography with gadolinium contrast or not   - XR concerning for osteomyelitis of both feet  - Transplant ID reccs appreciated   - MRI R/L foot: shows osteomyelitises   - cont Zosyn/Vanco for empiric coverage   -vanco trough 14.5   - Follow up blood cultures sent in ED     [ ] s/p DDRT 7/16/2023   - Baseline Cr ~1.0   - Immuno: ENv hold for supertheraputic level, MMF HELD d/t c/f for osteomyelitis , Pred 10   - PPx: Bactrim, Pepcid   - Leukopenia: Daily CBC w/ diff, holding Valcyte,       [ ] IDDM  - Continue Lantus/Lispro home dose     [ ] CHF, CAD s/p CABG, L SC DVT   - Continue Eliquis, ASA 81   - Continue Coreg, Lipitor

## 2023-12-09 NOTE — PROGRESS NOTE ADULT - ASSESSMENT
Left foot:    Status post amputation of the fifth metatarsal and proximal aspect of the   fifth proximal phalanx. Cortical irregularity along the fifth distal   phalanx, which may represent osteomyelitis. Correlate clinically for   overlying ulcer. MRI can be completed for further evaluation as   clinically indicated.    No acute displaced fracture or dislocation.  Tee Salvador is a 54 y/o M with past medical history significant for HTN, CHF (s/p AICD 2016), CAD (s/p CABG 2015), IDDM, PVD s/p stent x4 in RLE with R big toe/second toe amputation (2021) and ESRD on HD via LUE AVF for 6 years now s/p DDRT 7/16/2023 with Thymoglobulin induction. Post-transplant course complicated by severe constipation requiring disimpaction, DGF requiring hemodialysis, admission in August with K pneumoniae UTI treated with cefazolin then keflex. He has chronic bilateral foot wounds followed by wound care. PAtient states in the last week he started noticing some redness around the left leg foot ulcer with no pain or discharge. The podiatrist also noted necrosis of the tip of the third right toe. He was sent to ER for iv antibitoics and management. He has not received any antibiotics. He denies fever but felt chills a week back. On admission, afebrile, WBC 2.4.       Xray   Right foot:  Evaluation of the toes is limited due to patient positioning.  Status post amputation of the first ray to the level of the first   metatarsal head. There is minimal cortical irregularity with mild   progressive lucency within the first metatarsal head, which may represent   underlying osteomyelitis. Correlate clinically for overlying ulcer.  Status post amputation of the second toe to the level of the second   proximal phalanx with mild cortical irregularity along the amputation   margin of the second proximal phalanx, which may represent osteomyelitis.   Correlate clinically for overlying ulcer.      1. S/p DDKT 7/16/23 CMV D+/+, EBV D+/R+  Induction ATG  maintenance. tacro, mmf, pred  PPx; valcyte and bactrim  Complications DGF requiring HD    2. Left foot ulcer and cellulitis  3. Right 3d toe necrotic ulcer  4. History of first ray amputation 8/2021 - tissue cx grew E faecalis, Corynebacterium prevotella spp, Lecrercia spp   CXR with concern for OM although not in areas of concern on exam  Vascular studies with small vessel disease.     Left ulcer culture sent ? debrided tossue: Klebsiella pneumoniae, PsA and E faecalis  zosyn/vanc 12/6-    Recommendations  ·	MRi of both feet with OM of the left first metatarsal head and proximal phalynx of first toe and thrid right toe tip  ·	plan is to perform MRA with vascular  ·	Plan also for amputation of R thrid distal toe on 12/11 and debridement of LEft bone tissue  ·	Please send MARGIN LEFT bone tissue bacterial cx, MARGIn histopath from R toe and MARGIN bacterial cultures . Suscpet will still need 6 weeks of antibiotics given no plans to amputate L metatarsal/proximal phalanx  ·	Meanwhile, cultuures from ulcer superficial debridement with ESBl Klebsiella, PSA and E faecalis. REcommend stoping zosyn/vanc and starting Imipenem 1 g q8h   ·	continue valcyte and bactrim ppx    Thank you for involving us in the care of this patient  Transplant ID will continue to follow  Please call or page with additional questions  Pager; #[phone]  Teams: from 8 am to 5 pm  Elisabet Nguyen MD         Left foot:    Status post amputation of the fifth metatarsal and proximal aspect of the   fifth proximal phalanx. Cortical irregularity along the fifth distal   phalanx, which may represent osteomyelitis. Correlate clinically for   overlying ulcer. MRI can be completed for further evaluation as   clinically indicated.    No acute displaced fracture or dislocation.  Tee Salvador is a 54 y/o M with past medical history significant for HTN, CHF (s/p AICD 2016), CAD (s/p CABG 2015), IDDM, PVD s/p stent x4 in RLE with R big toe/second toe amputation (2021) and ESRD on HD via LUE AVF for 6 years now s/p DDRT 7/16/2023 with Thymoglobulin induction. Post-transplant course complicated by severe constipation requiring disimpaction, DGF requiring hemodialysis, admission in August with K pneumoniae UTI treated with cefazolin then keflex. He has chronic bilateral foot wounds followed by wound care. PAtient states in the last week he started noticing some redness around the left leg foot ulcer with no pain or discharge. The podiatrist also noted necrosis of the tip of the third right toe. He was sent to ER for iv antibitoics and management. He has not received any antibiotics. He denies fever but felt chills a week back. On admission, afebrile, WBC 2.4.       Xray   Right foot:  Evaluation of the toes is limited due to patient positioning.  Status post amputation of the first ray to the level of the first   metatarsal head. There is minimal cortical irregularity with mild   progressive lucency within the first metatarsal head, which may represent   underlying osteomyelitis. Correlate clinically for overlying ulcer.  Status post amputation of the second toe to the level of the second   proximal phalanx with mild cortical irregularity along the amputation   margin of the second proximal phalanx, which may represent osteomyelitis.   Correlate clinically for overlying ulcer.      1. S/p DDKT 7/16/23 CMV D+/+, EBV D+/R+  Induction ATG  maintenance. tacro, mmf, pred  PPx; valcyte and bactrim  Complications DGF requiring HD    2. Left foot ulcer and cellulitis  3. Right 3d toe necrotic ulcer  4. History of first ray amputation 8/2021 - tissue cx grew E faecalis, Corynebacterium prevotella spp, Lecrercia spp   CXR with concern for OM although not in areas of concern on exam  Vascular studies with small vessel disease.     Left ulcer culture sent ? debrided tossue: Klebsiella pneumoniae, PsA and E faecalis  zosyn/vanc 12/6-    Recommendations  ·	MRi of both feet with OM of the left first metatarsal head and proximal phalynx of first toe and thrid right toe tip  ·	plan is to perform MRA with vascular  ·	Plan also for amputation of R thrid distal toe on 12/11 and debridement of LEft bone tissue  ·	Please send MARGIN LEFT bone tissue bacterial cx, MARGIn histopath from R toe and MARGIN bacterial cultures . Suscpet will still need 6 weeks of antibiotics given no plans to amputate L metatarsal/proximal phalanx  ·	Meanwhile, cultuures from ulcer superficial debridement with ESBl Klebsiella, PSA and E faecalis. REcommend stoping zosyn/vanc and starting Imipenem 1 g q8h   ·	continue valcyte and bactrim ppx    Thank you for involving us in the care of this patient  Transplant ID will continue to follow  Please call or page with additional questions  Pager; #[phone]  Teams: from 8 am to 5 pm  Elisabet Nguyne MD         Left foot:    Status post amputation of the fifth metatarsal and proximal aspect of the   fifth proximal phalanx. Cortical irregularity along the fifth distal   phalanx, which may represent osteomyelitis. Correlate clinically for   overlying ulcer. MRI can be completed for further evaluation as   clinically indicated.    No acute displaced fracture or dislocation.  Tee Salvador is a 54 y/o M with past medical history significant for HTN, CHF (s/p AICD 2016), CAD (s/p CABG 2015), IDDM, PVD s/p stent x4 in RLE with R big toe/second toe amputation (2021) and ESRD on HD via LUE AVF for 6 years now s/p DDRT 7/16/2023 with Thymoglobulin induction. Post-transplant course complicated by severe constipation requiring disimpaction, DGF requiring hemodialysis, admission in August with K pneumoniae UTI treated with cefazolin then keflex. He has chronic bilateral foot wounds followed by wound care. PAtient states in the last week he started noticing some redness around the left leg foot ulcer with no pain or discharge. The podiatrist also noted necrosis of the tip of the third right toe. He was sent to ER for iv antibitoics and management. He has not received any antibiotics. He denies fever but felt chills a week back. On admission, afebrile, WBC 2.4.       Xray   Right foot:  Evaluation of the toes is limited due to patient positioning.  Status post amputation of the first ray to the level of the first   metatarsal head. There is minimal cortical irregularity with mild   progressive lucency within the first metatarsal head, which may represent   underlying osteomyelitis. Correlate clinically for overlying ulcer.  Status post amputation of the second toe to the level of the second   proximal phalanx with mild cortical irregularity along the amputation   margin of the second proximal phalanx, which may represent osteomyelitis.   Correlate clinically for overlying ulcer.      1. S/p DDKT 7/16/23 CMV D+/+, EBV D+/R+  Induction ATG  maintenance. tacro, mmf, pred  PPx; valcyte and bactrim  Complications DGF requiring HD    2. Left foot ulcer and cellulitis  3. Right 3d toe necrotic ulcer  4. History of first ray amputation 8/2021 - tissue cx grew E faecalis, Corynebacterium prevotella spp, Lecrercia spp   CXR with concern for OM although not in areas of concern on exam  Vascular studies with small vessel disease.     Left ulcer culture sent ? debrided tossue: Klebsiella pneumoniae, PsA and E faecalis  zosyn/vanc 12/6-    Recommendations  ·	MRi of both feet with OM of the left first metatarsal head and proximal phalynx of first toe and thrid right toe tip  ·	plan is to perform MRA with vascular  ·	Plan also for amputation of R thrid distal toe on 12/11 and debridement of LEft bone tissue  ·	Please send MARGIN LEFT bone tissue bacterial cx, MARGIn histopath from R toe and MARGIN bacterial cultures . Suspect will still need 6 weeks of antibiotics given no plans to amputate L metatarsal/proximal phalanx  ·	ESR, crp weekly  ·	Meanwhile, cultuures from ulcer superficial debridement with ESBl Klebsiella, PSA and E faecalis. REcommend stoping zosyn/vanc and starting Imipenem 1 g q8h   ·	continue valcyte and bactrim ppx    Thank you for involving us in the care of this patient  Transplant ID will continue to follow  Please call or page with additional questions  Pager; #[phone]  Teams: from 8 am to 5 pm  Elisabet Nguyen MD         Left foot:    Status post amputation of the fifth metatarsal and proximal aspect of the   fifth proximal phalanx. Cortical irregularity along the fifth distal   phalanx, which may represent osteomyelitis. Correlate clinically for   overlying ulcer. MRI can be completed for further evaluation as   clinically indicated.    No acute displaced fracture or dislocation.  Tee Salvador is a 52 y/o M with past medical history significant for HTN, CHF (s/p AICD 2016), CAD (s/p CABG 2015), IDDM, PVD s/p stent x4 in RLE with R big toe/second toe amputation (2021) and ESRD on HD via LUE AVF for 6 years now s/p DDRT 7/16/2023 with Thymoglobulin induction. Post-transplant course complicated by severe constipation requiring disimpaction, DGF requiring hemodialysis, admission in August with K pneumoniae UTI treated with cefazolin then keflex. He has chronic bilateral foot wounds followed by wound care. PAtient states in the last week he started noticing some redness around the left leg foot ulcer with no pain or discharge. The podiatrist also noted necrosis of the tip of the third right toe. He was sent to ER for iv antibitoics and management. He has not received any antibiotics. He denies fever but felt chills a week back. On admission, afebrile, WBC 2.4.       Xray   Right foot:  Evaluation of the toes is limited due to patient positioning.  Status post amputation of the first ray to the level of the first   metatarsal head. There is minimal cortical irregularity with mild   progressive lucency within the first metatarsal head, which may represent   underlying osteomyelitis. Correlate clinically for overlying ulcer.  Status post amputation of the second toe to the level of the second   proximal phalanx with mild cortical irregularity along the amputation   margin of the second proximal phalanx, which may represent osteomyelitis.   Correlate clinically for overlying ulcer.      1. S/p DDKT 7/16/23 CMV D+/+, EBV D+/R+  Induction ATG  maintenance. tacro, mmf, pred  PPx; valcyte and bactrim  Complications DGF requiring HD    2. Left foot ulcer and cellulitis  3. Right 3d toe necrotic ulcer  4. History of first ray amputation 8/2021 - tissue cx grew E faecalis, Corynebacterium prevotella spp, Lecrercia spp   CXR with concern for OM although not in areas of concern on exam  Vascular studies with small vessel disease.     Left ulcer culture sent ? debrided tossue: Klebsiella pneumoniae, PsA and E faecalis  zosyn/vanc 12/6-    Recommendations  ·	MRi of both feet with OM of the left first metatarsal head and proximal phalynx of first toe and thrid right toe tip  ·	plan is to perform MRA with vascular  ·	Plan also for amputation of R thrid distal toe on 12/11 and debridement of LEft bone tissue  ·	Please send MARGIN LEFT bone tissue bacterial cx, MARGIn histopath from R toe and MARGIN bacterial cultures . Suspect will still need 6 weeks of antibiotics given no plans to amputate L metatarsal/proximal phalanx  ·	ESR, crp weekly  ·	Meanwhile, cultuures from ulcer superficial debridement with ESBl Klebsiella, PSA and E faecalis. REcommend stoping zosyn/vanc and starting Imipenem 1 g q8h   ·	continue valcyte and bactrim ppx    Thank you for involving us in the care of this patient  Transplant ID will continue to follow  Please call or page with additional questions  Pager; #[phone]  Teams: from 8 am to 5 pm  Elisabet Nguyen MD

## 2023-12-09 NOTE — PROGRESS NOTE ADULT - SUBJECTIVE AND OBJECTIVE BOX
Afebrile  JAN/PVR findings significant for small vessel disease   MRI of both foot with OM   erythema around left foot ulcer improved  left ulcer cx growing Klebsiella pneumoniae, PsA and E faecalis      Vital Signs Last 24 Hrs  T(C): 36.7 (09 Dec 2023 17:00), Max: 37.2 (08 Dec 2023 21:36)  T(F): 98.1 (09 Dec 2023 17:00), Max: 98.9 (08 Dec 2023 21:36)  HR: 77 (09 Dec 2023 17:00) (68 - 81)  BP: 163/81 (09 Dec 2023 17:00) (127/79 - 191/69)  BP(mean): --  RR: 18 (09 Dec 2023 17:00) (18 - 18)  SpO2: 99% (09 Dec 2023 17:00) (98% - 100%)    Parameters below as of 09 Dec 2023 17:00  Patient On (Oxygen Delivery Method): room air        CONSTITUTIONAL: Well groomed, no apparent distress  EYES: PERRLA and symmetric, EOMI, No conjunctival or scleral injection, non-icteric  ENMT: Oral mucosa with moist membranes. Normal dentition; no pharyngeal injection or exudates             NECK: Supple, symmetric and without tracheal deviation   RESP: No respiratory distress, no use of accessory muscles; CTA b/l, no WRR  CV: RRR, +S1S2, no MRG; no JVD; no peripheral edema  GI: Soft, NT, ND, no rebound, no guarding; no palpable masses; no hepatosplenomegaly; no hernia palpated  LYMPH: No cervical LAD or tenderness; no axillary LAD or tenderness; no inguinal LAD or tenderness  MSK: Normal gait; No digital clubbing or cyanosis; examination of the (head/neck/spine/ribs/pelvis, RUE, LUE, RLE, LLE) without misalignment,            Normal ROM without pain, no spinal tenderness, normal muscle strength/tone  SKIN: left foot dorsal medial ulcer with surrounding erythema- improved, no discharge  R foot metatarsal stump c/d/i , 3d toe tip with dry ulcer, necrotic. no erythema surrounding this                  ____________________________________________________  ROS  GENERAL: denies chills, , night sweats, weight loss.   PSYCH: denies depression, anxiety, suicidal ideation, hallucination, and delusions  SKIN: no rash or lesions; no color changes, no abnormal nevi,no  dryness, and nojaundice    EYES: denies visual changes, floaters, pain, inflammation, blurred vision, and discharge  ENT: denies tinnitus, vertigo, epistaxis, oral lesion, and decreased acuity  PULM: denies, hemoptysis, pleurisy  CVS: denies angina, palpitations,+ orthopnea, no syncope, or heart murmur  GI: denies constipation, diarrhea, melena, abdominal pain, nausea.   : denies dysuria, frequency, discharge, incontinence, stones or macroscopic hematuria  MS: no arthralgias, no erythema or swelling, no myalgias, noedema, or lower back pain.   CNS: denies numbness, dizziness, seizure, or tremor  ENDO: denies heat/cold intolerance, polyuria, polydipsia, malaise.    HEME: denies bruising, bleeding, lymphadenopathy, anemia, and calf pain    Allergies  Contrast. (Unknown)    __________________________________________________  MEDS:  MEDICATIONS  (STANDING):  apixaban 5 every 12 hours  aspirin enteric coated 81 daily  atorvastatin 40 at bedtime  carvedilol 3.125 every 12 hours  famotidine    Tablet 20 daily  insulin glargine Injectable (LANTUS) 10 at bedtime  insulin lispro (ADMELOG) corrective regimen sliding scale  at bedtime  insulin lispro (ADMELOG) corrective regimen sliding scale  three times a day before meals  insulin lispro Injectable (ADMELOG) 10 three times a day with meals  predniSONE   Tablet 10 daily    _________________________________________________  ANTIMICOBIALS  piperacillin/tazobactam IVPB.. 3.375 every 8 hours  trimethoprim   80 mG/sulfamethoxazole 400 mG 1 daily  vancomycin  IVPB 1250 every 12 hours      GENERAL LABS              10.1                 x    | x    | x            2.82  >-----------< 164     ------------------------< x                     32.6                 x    | x    | x                                            Ca x     Mg x     Ph x        Vancomycin Level, Trough: 14.5 (12-08 @ 07:22)    Urinalysis Basic - ( 09 Dec 2023 05:45 )    Color: x / Appearance: x / SG: x / pH: x  Gluc: 165 mg/dL / Ketone: x  / Bili: x / Urobili: x   Blood: x / Protein: x / Nitrite: x   Leuk Esterase: x / RBC: x / WBC x   Sq Epi: x / Non Sq Epi: x / Bacteria: x        _________________________________________________  MICROBIOLOGY  -----------    Culture - Abscess with Gram Stain (collected 06 Dec 2023 16:59)  Source: .Abscess left foot  Preliminary Report (08 Dec 2023 19:58):    Numerous Klebsiella pneumoniae ESBL    Numerous Pseudomonas aeruginosa    Numerous Enterococcus faecalis Susceptibility to follow.    Normal skin linsey isolated  Organism: Klebsiella pneumoniae ESBL  Pseudomonas aeruginosa (08 Dec 2023 18:51)  Organism: Pseudomonas aeruginosa (08 Dec 2023 18:51)      Method Type: RUBY      -  Aztreonam: R >16      -  Cefepime: S 8      -  Ceftazidime: S 4      -  Ciprofloxacin: S 0.5      -  Imipenem: S 2      -  Levofloxacin: I 2      -  Meropenem: S <=1      -  Piperacillin/Tazobactam: S 16  Organism: Klebsiella pneumoniae ESBL (08 Dec 2023 18:51)      Method Type: RUBY      -  Amikacin: [id number]      -  Amoxicillin/Clavulanic Acid: S <=8/4      -  Ampicillin: R >16 These ampicillin results predict results for amoxicillin      -  Ampicillin/Sulbactam: R 16/8      -  Aztreonam: R 16      -  Cefazolin: R >16      -  Cefepime: R 8      -  Ceftriaxone: R >32      -  Ciprofloxacin: R 1      -  Ertapenem: S <=0.5      -  Gentamicin: S <=2      -  Imipenem: S <=1      -  Levofloxacin: I 1      -  Meropenem: S <=1      -  Piperacillin/Tazobactam: R <=8      -  Tobramycin: S <=2      -  Trimethoprim/Sulfamethoxazole: R >2/38    Culture - Blood (collected 06 Dec 2023 13:08)  Source: .Blood Blood  Preliminary Report (09 Dec 2023 18:01):    No growth at 72 Hours    Culture - Blood (collected 06 Dec 2023 12:40)  Source: .Blood Blood  Preliminary Report (09 Dec 2023 18:01):    No growth at 72 Hours            CMVPCR Log: NotDetec [id number]/mL (12-07 @ 07:01)          Fungitell:   _______________________________________________  PERTINENT IMAGING         Afebrile  JAN/PVR findings significant for small vessel disease   MRI of both foot with OM   erythema around left foot ulcer improved  left ulcer cx growing Klebsiella pneumoniae, PsA and E faecalis      Vital Signs Last 24 Hrs  T(C): 36.7 (09 Dec 2023 17:00), Max: 37.2 (08 Dec 2023 21:36)  T(F): 98.1 (09 Dec 2023 17:00), Max: 98.9 (08 Dec 2023 21:36)  HR: 77 (09 Dec 2023 17:00) (68 - 81)  BP: 163/81 (09 Dec 2023 17:00) (127/79 - 191/69)  BP(mean): --  RR: 18 (09 Dec 2023 17:00) (18 - 18)  SpO2: 99% (09 Dec 2023 17:00) (98% - 100%)    Parameters below as of 09 Dec 2023 17:00  Patient On (Oxygen Delivery Method): room air        CONSTITUTIONAL: Well groomed, no apparent distress  EYES: PERRLA and symmetric, EOMI, No conjunctival or scleral injection, non-icteric  ENMT: Oral mucosa with moist membranes. Normal dentition; no pharyngeal injection or exudates             NECK: Supple, symmetric and without tracheal deviation   RESP: No respiratory distress, no use of accessory muscles; CTA b/l, no WRR  CV: RRR, +S1S2, no MRG; no JVD; no peripheral edema  GI: Soft, NT, ND, no rebound, no guarding; no palpable masses; no hepatosplenomegaly; no hernia palpated  LYMPH: No cervical LAD or tenderness; no axillary LAD or tenderness; no inguinal LAD or tenderness  MSK: Normal gait; No digital clubbing or cyanosis; examination of the (head/neck/spine/ribs/pelvis, RUE, LUE, RLE, LLE) without misalignment,            Normal ROM without pain, no spinal tenderness, normal muscle strength/tone  SKIN: left foot dorsal medial ulcer with surrounding erythema- improved, no discharge  R foot metatarsal stump c/d/i , 3d toe tip with dry ulcer, necrotic. no erythema surrounding this                  ____________________________________________________  ROS  GENERAL: denies chills, , night sweats, weight loss.   PSYCH: denies depression, anxiety, suicidal ideation, hallucination, and delusions  SKIN: no rash or lesions; no color changes, no abnormal nevi,no  dryness, and nojaundice    EYES: denies visual changes, floaters, pain, inflammation, blurred vision, and discharge  ENT: denies tinnitus, vertigo, epistaxis, oral lesion, and decreased acuity  PULM: denies, hemoptysis, pleurisy  CVS: denies angina, palpitations,+ orthopnea, no syncope, or heart murmur  GI: denies constipation, diarrhea, melena, abdominal pain, nausea.   : denies dysuria, frequency, discharge, incontinence, stones or macroscopic hematuria  MS: no arthralgias, no erythema or swelling, no myalgias, noedema, or lower back pain.   CNS: denies numbness, dizziness, seizure, or tremor  ENDO: denies heat/cold intolerance, polyuria, polydipsia, malaise.    HEME: denies bruising, bleeding, lymphadenopathy, anemia, and calf pain    Allergies  Contrast. (Unknown)    __________________________________________________  MEDS:  MEDICATIONS  (STANDING):  apixaban 5 every 12 hours  aspirin enteric coated 81 daily  atorvastatin 40 at bedtime  carvedilol 3.125 every 12 hours  famotidine    Tablet 20 daily  insulin glargine Injectable (LANTUS) 10 at bedtime  insulin lispro (ADMELOG) corrective regimen sliding scale  at bedtime  insulin lispro (ADMELOG) corrective regimen sliding scale  three times a day before meals  insulin lispro Injectable (ADMELOG) 10 three times a day with meals  predniSONE   Tablet 10 daily    _________________________________________________  ANTIMICOBIALS  piperacillin/tazobactam IVPB.. 3.375 every 8 hours  trimethoprim   80 mG/sulfamethoxazole 400 mG 1 daily  vancomycin  IVPB 1250 every 12 hours      GENERAL LABS              10.1                 x    | x    | x            2.82  >-----------< 164     ------------------------< x                     32.6                 x    | x    | x                                            Ca x     Mg x     Ph x        Vancomycin Level, Trough: 14.5 (12-08 @ 07:22)    Urinalysis Basic - ( 09 Dec 2023 05:45 )    Color: x / Appearance: x / SG: x / pH: x  Gluc: 165 mg/dL / Ketone: x  / Bili: x / Urobili: x   Blood: x / Protein: x / Nitrite: x   Leuk Esterase: x / RBC: x / WBC x   Sq Epi: x / Non Sq Epi: x / Bacteria: x        _________________________________________________  MICROBIOLOGY  -----------    Culture - Abscess with Gram Stain (collected 06 Dec 2023 16:59)  Source: .Abscess left foot  Preliminary Report (08 Dec 2023 19:58):    Numerous Klebsiella pneumoniae ESBL    Numerous Pseudomonas aeruginosa    Numerous Enterococcus faecalis Susceptibility to follow.    Normal skin linsey isolated  Organism: Klebsiella pneumoniae ESBL  Pseudomonas aeruginosa (08 Dec 2023 18:51)  Organism: Pseudomonas aeruginosa (08 Dec 2023 18:51)      Method Type: RUBY      -  Aztreonam: R >16      -  Cefepime: S 8      -  Ceftazidime: S 4      -  Ciprofloxacin: S 0.5      -  Imipenem: S 2      -  Levofloxacin: I 2      -  Meropenem: S <=1      -  Piperacillin/Tazobactam: S 16  Organism: Klebsiella pneumoniae ESBL (08 Dec 2023 18:51)      Method Type: RUBY      -  Amikacin: [id number]      -  Amoxicillin/Clavulanic Acid: S <=8/4      -  Ampicillin: R >16 These ampicillin results predict results for amoxicillin      -  Ampicillin/Sulbactam: R 16/8      -  Aztreonam: R 16      -  Cefazolin: R >16      -  Cefepime: R 8      -  Ceftriaxone: R >32      -  Ciprofloxacin: R 1      -  Ertapenem: S <=0.5      -  Gentamicin: S <=2      -  Imipenem: S <=1      -  Levofloxacin: I 1      -  Meropenem: S <=1      -  Piperacillin/Tazobactam: R <=8      -  Tobramycin: S <=2      -  Trimethoprim/Sulfamethoxazole: R >2/38    Culture - Blood (collected 06 Dec 2023 13:08)  Source: .Blood Blood  Preliminary Report (09 Dec 2023 18:01):    No growth at 72 Hours    Culture - Blood (collected 06 Dec 2023 12:40)  Source: .Blood Blood  Preliminary Report (09 Dec 2023 18:01):    No growth at 72 Hours            CMVPCR Log: NotDetec [id number]/mL (12-07 @ 07:01)          Fungitell:   _______________________________________________  PERTINENT IMAGING

## 2023-12-10 ENCOUNTER — TRANSCRIPTION ENCOUNTER (OUTPATIENT)
Age: 53
End: 2023-12-10

## 2023-12-10 LAB
ANION GAP SERPL CALC-SCNC: 8 MMOL/L — SIGNIFICANT CHANGE UP (ref 5–17)
ANION GAP SERPL CALC-SCNC: 8 MMOL/L — SIGNIFICANT CHANGE UP (ref 5–17)
BASOPHILS # BLD AUTO: 0.02 K/UL — SIGNIFICANT CHANGE UP (ref 0–0.2)
BASOPHILS # BLD AUTO: 0.02 K/UL — SIGNIFICANT CHANGE UP (ref 0–0.2)
BASOPHILS NFR BLD AUTO: 0.9 % — SIGNIFICANT CHANGE UP (ref 0–2)
BASOPHILS NFR BLD AUTO: 0.9 % — SIGNIFICANT CHANGE UP (ref 0–2)
BUN SERPL-MCNC: 23 MG/DL — SIGNIFICANT CHANGE UP (ref 7–23)
BUN SERPL-MCNC: 23 MG/DL — SIGNIFICANT CHANGE UP (ref 7–23)
CALCIUM SERPL-MCNC: 10.3 MG/DL — SIGNIFICANT CHANGE UP (ref 8.4–10.5)
CALCIUM SERPL-MCNC: 10.3 MG/DL — SIGNIFICANT CHANGE UP (ref 8.4–10.5)
CHLORIDE SERPL-SCNC: 108 MMOL/L — SIGNIFICANT CHANGE UP (ref 96–108)
CHLORIDE SERPL-SCNC: 108 MMOL/L — SIGNIFICANT CHANGE UP (ref 96–108)
CO2 SERPL-SCNC: 22 MMOL/L — SIGNIFICANT CHANGE UP (ref 22–31)
CO2 SERPL-SCNC: 22 MMOL/L — SIGNIFICANT CHANGE UP (ref 22–31)
CREAT SERPL-MCNC: 1.32 MG/DL — HIGH (ref 0.5–1.3)
CREAT SERPL-MCNC: 1.32 MG/DL — HIGH (ref 0.5–1.3)
EGFR: 65 ML/MIN/1.73M2 — SIGNIFICANT CHANGE UP
EGFR: 65 ML/MIN/1.73M2 — SIGNIFICANT CHANGE UP
EOSINOPHIL # BLD AUTO: 0.07 K/UL — SIGNIFICANT CHANGE UP (ref 0–0.5)
EOSINOPHIL # BLD AUTO: 0.07 K/UL — SIGNIFICANT CHANGE UP (ref 0–0.5)
EOSINOPHIL NFR BLD AUTO: 2.7 % — SIGNIFICANT CHANGE UP (ref 0–6)
EOSINOPHIL NFR BLD AUTO: 2.7 % — SIGNIFICANT CHANGE UP (ref 0–6)
GLUCOSE BLDC GLUCOMTR-MCNC: 126 MG/DL — HIGH (ref 70–99)
GLUCOSE BLDC GLUCOMTR-MCNC: 126 MG/DL — HIGH (ref 70–99)
GLUCOSE BLDC GLUCOMTR-MCNC: 135 MG/DL — HIGH (ref 70–99)
GLUCOSE BLDC GLUCOMTR-MCNC: 135 MG/DL — HIGH (ref 70–99)
GLUCOSE BLDC GLUCOMTR-MCNC: 155 MG/DL — HIGH (ref 70–99)
GLUCOSE BLDC GLUCOMTR-MCNC: 155 MG/DL — HIGH (ref 70–99)
GLUCOSE BLDC GLUCOMTR-MCNC: 99 MG/DL — SIGNIFICANT CHANGE UP (ref 70–99)
GLUCOSE BLDC GLUCOMTR-MCNC: 99 MG/DL — SIGNIFICANT CHANGE UP (ref 70–99)
GLUCOSE SERPL-MCNC: 152 MG/DL — HIGH (ref 70–99)
GLUCOSE SERPL-MCNC: 152 MG/DL — HIGH (ref 70–99)
HCT VFR BLD CALC: 32.4 % — LOW (ref 39–50)
HCT VFR BLD CALC: 32.4 % — LOW (ref 39–50)
HGB BLD-MCNC: 10 G/DL — LOW (ref 13–17)
HGB BLD-MCNC: 10 G/DL — LOW (ref 13–17)
LYMPHOCYTES # BLD AUTO: 0.16 K/UL — LOW (ref 1–3.3)
LYMPHOCYTES # BLD AUTO: 0.16 K/UL — LOW (ref 1–3.3)
LYMPHOCYTES # BLD AUTO: 6.2 % — LOW (ref 13–44)
LYMPHOCYTES # BLD AUTO: 6.2 % — LOW (ref 13–44)
MAGNESIUM SERPL-MCNC: 1.9 MG/DL — SIGNIFICANT CHANGE UP (ref 1.6–2.6)
MAGNESIUM SERPL-MCNC: 1.9 MG/DL — SIGNIFICANT CHANGE UP (ref 1.6–2.6)
MANUAL SMEAR VERIFICATION: SIGNIFICANT CHANGE UP
MANUAL SMEAR VERIFICATION: SIGNIFICANT CHANGE UP
MCHC RBC-ENTMCNC: 27.9 PG — SIGNIFICANT CHANGE UP (ref 27–34)
MCHC RBC-ENTMCNC: 27.9 PG — SIGNIFICANT CHANGE UP (ref 27–34)
MCHC RBC-ENTMCNC: 30.9 GM/DL — LOW (ref 32–36)
MCHC RBC-ENTMCNC: 30.9 GM/DL — LOW (ref 32–36)
MCV RBC AUTO: 90.5 FL — SIGNIFICANT CHANGE UP (ref 80–100)
MCV RBC AUTO: 90.5 FL — SIGNIFICANT CHANGE UP (ref 80–100)
MONOCYTES # BLD AUTO: 0.36 K/UL — SIGNIFICANT CHANGE UP (ref 0–0.9)
MONOCYTES # BLD AUTO: 0.36 K/UL — SIGNIFICANT CHANGE UP (ref 0–0.9)
MONOCYTES NFR BLD AUTO: 13.4 % — SIGNIFICANT CHANGE UP (ref 2–14)
MONOCYTES NFR BLD AUTO: 13.4 % — SIGNIFICANT CHANGE UP (ref 2–14)
NEUTROPHILS # BLD AUTO: 2.04 K/UL — SIGNIFICANT CHANGE UP (ref 1.8–7.4)
NEUTROPHILS # BLD AUTO: 2.04 K/UL — SIGNIFICANT CHANGE UP (ref 1.8–7.4)
NEUTROPHILS NFR BLD AUTO: 76.8 % — SIGNIFICANT CHANGE UP (ref 43–77)
NEUTROPHILS NFR BLD AUTO: 76.8 % — SIGNIFICANT CHANGE UP (ref 43–77)
PHOSPHATE SERPL-MCNC: 2.2 MG/DL — LOW (ref 2.5–4.5)
PHOSPHATE SERPL-MCNC: 2.2 MG/DL — LOW (ref 2.5–4.5)
PLAT MORPH BLD: ABNORMAL
PLAT MORPH BLD: ABNORMAL
PLATELET # BLD AUTO: 176 K/UL — SIGNIFICANT CHANGE UP (ref 150–400)
PLATELET # BLD AUTO: 176 K/UL — SIGNIFICANT CHANGE UP (ref 150–400)
POTASSIUM SERPL-MCNC: 4.8 MMOL/L — SIGNIFICANT CHANGE UP (ref 3.5–5.3)
POTASSIUM SERPL-MCNC: 4.8 MMOL/L — SIGNIFICANT CHANGE UP (ref 3.5–5.3)
POTASSIUM SERPL-SCNC: 4.8 MMOL/L — SIGNIFICANT CHANGE UP (ref 3.5–5.3)
POTASSIUM SERPL-SCNC: 4.8 MMOL/L — SIGNIFICANT CHANGE UP (ref 3.5–5.3)
RBC # BLD: 3.58 M/UL — LOW (ref 4.2–5.8)
RBC # BLD: 3.58 M/UL — LOW (ref 4.2–5.8)
RBC # FLD: 12.9 % — SIGNIFICANT CHANGE UP (ref 10.3–14.5)
RBC # FLD: 12.9 % — SIGNIFICANT CHANGE UP (ref 10.3–14.5)
RBC BLD AUTO: SIGNIFICANT CHANGE UP
RBC BLD AUTO: SIGNIFICANT CHANGE UP
SODIUM SERPL-SCNC: 138 MMOL/L — SIGNIFICANT CHANGE UP (ref 135–145)
SODIUM SERPL-SCNC: 138 MMOL/L — SIGNIFICANT CHANGE UP (ref 135–145)
TACROLIMUS SERPL-MCNC: 12.1 NG/ML — SIGNIFICANT CHANGE UP
TACROLIMUS SERPL-MCNC: 12.1 NG/ML — SIGNIFICANT CHANGE UP
WBC # BLD: 2.65 K/UL — LOW (ref 3.8–10.5)
WBC # BLD: 2.65 K/UL — LOW (ref 3.8–10.5)
WBC # FLD AUTO: 2.65 K/UL — LOW (ref 3.8–10.5)
WBC # FLD AUTO: 2.65 K/UL — LOW (ref 3.8–10.5)

## 2023-12-10 PROCEDURE — 99232 SBSQ HOSP IP/OBS MODERATE 35: CPT

## 2023-12-10 RX ORDER — IMIPENEM AND CILASTATIN 250; 250 MG/100ML; MG/100ML
1000 INJECTION, POWDER, FOR SOLUTION INTRAVENOUS EVERY 8 HOURS
Refills: 0 | Status: DISCONTINUED | OUTPATIENT
Start: 2023-12-10 | End: 2023-12-11

## 2023-12-10 RX ORDER — TACROLIMUS 5 MG/1
2 CAPSULE ORAL ONCE
Refills: 0 | Status: COMPLETED | OUTPATIENT
Start: 2023-12-10 | End: 2023-12-10

## 2023-12-10 RX ADMIN — IMIPENEM AND CILASTATIN 250 MILLIGRAM(S): 250; 250 INJECTION, POWDER, FOR SOLUTION INTRAVENOUS at 22:01

## 2023-12-10 RX ADMIN — Medication 10 UNIT(S): at 08:50

## 2023-12-10 RX ADMIN — INSULIN GLARGINE 10 UNIT(S): 100 INJECTION, SOLUTION SUBCUTANEOUS at 22:01

## 2023-12-10 RX ADMIN — FAMOTIDINE 20 MILLIGRAM(S): 10 INJECTION INTRAVENOUS at 12:36

## 2023-12-10 RX ADMIN — CARVEDILOL PHOSPHATE 3.12 MILLIGRAM(S): 80 CAPSULE, EXTENDED RELEASE ORAL at 17:51

## 2023-12-10 RX ADMIN — APIXABAN 5 MILLIGRAM(S): 2.5 TABLET, FILM COATED ORAL at 22:00

## 2023-12-10 RX ADMIN — Medication 1 TABLET(S): at 12:36

## 2023-12-10 RX ADMIN — Medication 10 MILLIGRAM(S): at 05:09

## 2023-12-10 RX ADMIN — Medication 2: at 08:48

## 2023-12-10 RX ADMIN — Medication 81 MILLIGRAM(S): at 12:36

## 2023-12-10 RX ADMIN — CARVEDILOL PHOSPHATE 3.12 MILLIGRAM(S): 80 CAPSULE, EXTENDED RELEASE ORAL at 05:09

## 2023-12-10 RX ADMIN — TACROLIMUS 2 MILLIGRAM(S): 5 CAPSULE ORAL at 12:43

## 2023-12-10 RX ADMIN — ATORVASTATIN CALCIUM 40 MILLIGRAM(S): 80 TABLET, FILM COATED ORAL at 22:01

## 2023-12-10 RX ADMIN — APIXABAN 5 MILLIGRAM(S): 2.5 TABLET, FILM COATED ORAL at 08:50

## 2023-12-10 RX ADMIN — Medication 10 UNIT(S): at 12:37

## 2023-12-10 RX ADMIN — IMIPENEM AND CILASTATIN 250 MILLIGRAM(S): 250; 250 INJECTION, POWDER, FOR SOLUTION INTRAVENOUS at 13:20

## 2023-12-10 RX ADMIN — IMIPENEM AND CILASTATIN 250 MILLIGRAM(S): 250; 250 INJECTION, POWDER, FOR SOLUTION INTRAVENOUS at 06:43

## 2023-12-10 RX ADMIN — PIPERACILLIN AND TAZOBACTAM 25 GRAM(S): 4; .5 INJECTION, POWDER, LYOPHILIZED, FOR SOLUTION INTRAVENOUS at 02:45

## 2023-12-10 NOTE — PROGRESS NOTE ADULT - ASSESSMENT
53M with PMH of HTN, HLD, DM2, CAD s/p CABG, HF s/p AICD, ESRD on HD since 2016 s/p DDRT (7/14/23) complicated by DGF, and toe amputations due to PVD/osteo, now presenting to the ED with concern for toe infection. Nephrology service consulted for management of immunosuppression of transplanted kidney.       1. s/p DDRT on 7/14/23 - Allograft function is excellent.   2. Immunosuppression - Initially received Simulect induction but changed to Thymoglobulin given delayed graft function. on Envarsus with goal 8-10,  Cellcept on hold due to OM , Ppx with Valcyte and Bactrim.  3. Osteomyelitis - Managed by Podiatry, Vascular, and Transplant ID.  f/u with vascular.   4. HTN - BP at goal.   5. DM2- On lantus and premeal insulin

## 2023-12-10 NOTE — PROGRESS NOTE ADULT - ASSESSMENT
53M w bilateral foot wounds  - Patient seen and evaluated  - Afebrile, WBC 2.40, ESR/CRP ordered  - Right foot third digit with distal tip fibronecrotic wound to bone with surrounding hyperkeratosis, no drainage, no erythema, no edema. Left foot dorsal medial wound with fibronecrotic wound bed to subQ, moderate malodor, scant serous drainage, with periwound erythema.  - Left foot wound cultured and ordered  - Recommending IV vanco and cefepime  - Recommending ID consult under Dr Boyd  - Bilateral foot xrays: left foot cortical irregularity along the fifth distal phalanx, and right foot first digit metatarsal head cortical irregularity and right foot second proximal phalanx cortical irregularity. all cortical irregularity noted on xrays do no clinically correlate.  - ordered JAN/PVR 2/2 to non palpable pulses: recommending vascular consult under Dr Malone  - Patient has an AICD that needs to be interrogated   - Pod plan left foot wound debridement with possible graft application and right foot partial 3rd toe amputation pending JAN/PVR and vasc recs  - Please document medical/cards clearance for podiatry procedure under anesthesia   - Discussed  53M w bilateral foot wounds  - Patient seen and evaluated  - Afebrile, WBC 2.6  - Right foot third digit with distal tip fibronecrotic wound to bone with surrounding hyperkeratosis, no drainage, no erythema, no edema. Left foot dorsal medial wound with fibronecrotic wound bed to subQ, moderate malodor, scant serous drainage, with periwound erythema.  - Left foot wound culture growing klebsiella oxytoca, E. Faecalis, M. Morganii, Profidencia Stauartii (prelim)  - ID recommendations, appreciated   - Bilateral foot xrays: left foot cortical irregularity along the fifth distal phalanx, and right foot first digit metatarsal head cortical irregularity and right foot second proximal phalanx cortical irregularity. all cortical irregularity noted on xrays do no clinically correlate.  - Vascular recommendations appreciated - planning possible MRA  - AICD interrogation on 12/8, appreciated   - Pod plan left foot wound debridement with possible graft application and right foot partial 3rd toe amputation Monday 12/11 at 3PM w/ Dr. Myers   - Medical and cardiac optimization appreciated   - NPO after midnight  - CBC, BMP, Coags, and Type and screen ordered for AM  - Discussed with attending

## 2023-12-10 NOTE — PROGRESS NOTE ADULT - SUBJECTIVE AND OBJECTIVE BOX
[facility] DIVISION OF KIDNEY DISEASES AND HYPERTENSION -- FOLLOW UP NOTE  --------------------------------------------------------------------------------  Chief Complaint:    24 hour events/subjective:  Patient was seen and examined at bedside        PAST HISTORY  --------------------------------------------------------------------------------  No significant changes to PMH, PSH, FHx, SHx, unless otherwise noted    ALLERGIES & MEDICATIONS  --------------------------------------------------------------------------------  Allergies    Contrast. (Unknown)    Intolerances      Standing Inpatient Medications  apixaban 5 milliGRAM(s) Oral every 12 hours  aspirin enteric coated 81 milliGRAM(s) Oral daily  atorvastatin 40 milliGRAM(s) Oral at bedtime  carvedilol 3.125 milliGRAM(s) Oral every 12 hours  famotidine    Tablet 20 milliGRAM(s) Oral daily  imipenem/cilastatin  IVPB 1000 milliGRAM(s) IV Intermittent every 8 hours  insulin glargine Injectable (LANTUS) 10 Unit(s) SubCutaneous at bedtime  insulin lispro (ADMELOG) corrective regimen sliding scale   SubCutaneous at bedtime  insulin lispro (ADMELOG) corrective regimen sliding scale   SubCutaneous three times a day before meals  insulin lispro Injectable (ADMELOG) 10 Unit(s) SubCutaneous three times a day with meals  predniSONE   Tablet 10 milliGRAM(s) Oral daily  trimethoprim   80 mG/sulfamethoxazole 400 mG 1 Tablet(s) Oral daily    PRN Inpatient Medications      REVIEW OF SYSTEMS  --------------------------------------------------------------------------------  Gen: No fatigue, fevers/chills, weakness  Skin: No rashes  Head/Eyes/Ears/Mouth: No headache;No sore throat  Respiratory: No dyspnea, cough,   CV: No chest pain, PND, orthopnea  GI: No abdominal pain, diarrhea, constipation, nausea, vomiting  Transplant: No pain  : No increased frequency, dysuria, hematuria, nocturia  MSK: No joint pain/swelling; no back pain; no edema  Neuro: No dizziness/lightheadedness, weakness, seizures, numbness, tingling  Psych: No significant nervousness, anxiety, stress, depression    All other systems were reviewed and are negative, except as noted.    VITALS/PHYSICAL EXAM  --------------------------------------------------------------------------------  T(C): 36.7 (12-10-23 @ 08:39), Max: 36.7 (12-09-23 @ 13:00)  HR: 79 (12-10-23 @ 08:39) (72 - 81)  BP: 156/80 (12-10-23 @ 08:39) (138/73 - 165/77)  RR: 18 (12-10-23 @ 08:39) (18 - 18)  SpO2: 99% (12-10-23 @ 08:39) (99% - 100%)  Wt(kg): --        12-09-23 @ 07:01  -  12-10-23 @ 07:00  --------------------------------------------------------  IN: 360 mL / OUT: 1325 mL / NET: -965 mL    12-10-23 @ 07:01  -  12-10-23 @ 09:56  --------------------------------------------------------  IN: 0 mL / OUT: 150 mL / NET: -150 mL      Physical Exam:  	Gen: NAD  	HEENT: PERRL, supple neck, clear oropharynx  	Pulm: CTA B/L  	CV: RRR, S1S2; no rub  	Back: No spinal or CVA tenderness; no sacral edema  	Abd: +BS, soft, nontender/nondistended                      Transplant: No tenderness, swelling  	: No suprapubic tenderness  	UE: Warm, FROM; no edema; no asterixis  	LE: Warm, FROM; no edema  	Neuro: No focal deficits  	Psych: Normal affect and mood  	Skin: Warm, without rashes      LABS/STUDIES  --------------------------------------------------------------------------------              10.0   2.65  >-----------<  176      [12-10-23 @ 06:30]              32.4     138  |  108  |  23  ----------------------------<  152      [12-10-23 @ 06:30]  4.8   |  22  |  1.32        Ca     10.3     [12-10-23 @ 06:30]      Mg     1.9     [12-10-23 @ 06:30]      Phos  2.2     [12-10-23 @ 06:30]          Creatinine Trend:  SCr 1.32 [12-10 @ 06:30]  SCr 1.21 [12-09 @ 05:45]  SCr 1.15 [12-08 @ 07:17]  SCr 1.07 [12-07 @ 06:59]  SCr 1.00 [12-06 @ 13:21]    Tacrolimus (), Serum: 12.1 ng/mL (12-10 @ 06:30)  Tacrolimus (), Serum: 19.4 ng/mL (12-09 @ 05:46)  Tacrolimus (), Serum: 11.5 ng/mL (12-08 @ 07:23)  Tacrolimus (), Serum: 7.4 ng/mL (12-07 @ 07:01)        Urinalysis - [12-10-23 @ 06:30]      Color  / Appearance  / SG  / pH       Gluc 152 / Ketone   / Bili  / Urobili        Blood  / Protein  / Leuk Est  / Nitrite       RBC  / WBC  / Hyaline  / Gran  / Sq Epi  / Non Sq Epi  / Bacteria       Iron 68, TIBC 185, %sat 37      [08-16-23 @ 06:39]  Ferritin 1010      [08-16-23 @ 06:39]  PTH -- (Ca 10.6)      [11-02-23 @ 11:56]   220  Vitamin D (25OH) 18.0      [11-02-23 @ 11:56]         [facility] DIVISION OF KIDNEY DISEASES AND HYPERTENSION -- FOLLOW UP NOTE  --------------------------------------------------------------------------------  Chief Complaint:    24 hour events/subjective:  Patient was seen and examined at bedside        PAST HISTORY  --------------------------------------------------------------------------------  No significant changes to PMH, PSH, FHx, SHx, unless otherwise noted    ALLERGIES & MEDICATIONS  --------------------------------------------------------------------------------  Allergies    Contrast. (Unknown)    Intolerances      Standing Inpatient Medications  apixaban 5 milliGRAM(s) Oral every 12 hours  aspirin enteric coated 81 milliGRAM(s) Oral daily  atorvastatin 40 milliGRAM(s) Oral at bedtime  carvedilol 3.125 milliGRAM(s) Oral every 12 hours  famotidine    Tablet 20 milliGRAM(s) Oral daily  imipenem/cilastatin  IVPB 1000 milliGRAM(s) IV Intermittent every 8 hours  insulin glargine Injectable (LANTUS) 10 Unit(s) SubCutaneous at bedtime  insulin lispro (ADMELOG) corrective regimen sliding scale   SubCutaneous at bedtime  insulin lispro (ADMELOG) corrective regimen sliding scale   SubCutaneous three times a day before meals  insulin lispro Injectable (ADMELOG) 10 Unit(s) SubCutaneous three times a day with meals  predniSONE   Tablet 10 milliGRAM(s) Oral daily  trimethoprim   80 mG/sulfamethoxazole 400 mG 1 Tablet(s) Oral daily    PRN Inpatient Medications      REVIEW OF SYSTEMS  --------------------------------------------------------------------------------  Gen: No fatigue, fevers/chills, weakness  Skin: No rashes  Head/Eyes/Ears/Mouth: No headache;No sore throat  Respiratory: No dyspnea, cough,   CV: No chest pain, PND, orthopnea  GI: No abdominal pain, diarrhea, constipation, nausea, vomiting  Transplant: No pain  : No increased frequency, dysuria, hematuria, nocturia  MSK: No joint pain/swelling; no back pain; no edema  Neuro: No dizziness/lightheadedness, weakness, seizures, numbness, tingling  Psych: No significant nervousness, anxiety, stress, depression    All other systems were reviewed and are negative, except as noted.    VITALS/PHYSICAL EXAM  --------------------------------------------------------------------------------  T(C): 36.7 (12-10-23 @ 08:39), Max: 36.7 (12-09-23 @ 13:00)  HR: 79 (12-10-23 @ 08:39) (72 - 81)  BP: 156/80 (12-10-23 @ 08:39) (138/73 - 165/77)  RR: 18 (12-10-23 @ 08:39) (18 - 18)  SpO2: 99% (12-10-23 @ 08:39) (99% - 100%)  Wt(kg): --        12-09-23 @ 07:01  -  12-10-23 @ 07:00  --------------------------------------------------------  IN: 360 mL / OUT: 1325 mL / NET: -965 mL    12-10-23 @ 07:01  -  12-10-23 @ 09:56  --------------------------------------------------------  IN: 0 mL / OUT: 150 mL / NET: -150 mL      Physical Exam:  	Gen: NAD  	HEENT: PERRL, supple neck, clear oropharynx  	Pulm: CTA B/L  	CV: RRR, S1S2; no rub  	Back: No spinal or CVA tenderness; no sacral edema  	Abd: +BS, soft, nontender/nondistended                      Transplant: No tenderness, swelling  	: No suprapubic tenderness  	UE: Warm, FROM; no edema; no asterixis  	LE: Warm, FROM; no edema  	Neuro: No focal deficits  	Psych: Normal affect and mood  	Skin: Warm, without rashes      LABS/STUDIES  --------------------------------------------------------------------------------              10.0   2.65  >-----------<  176      [12-10-23 @ 06:30]              32.4     138  |  108  |  23  ----------------------------<  152      [12-10-23 @ 06:30]  4.8   |  22  |  1.32        Ca     10.3     [12-10-23 @ 06:30]      Mg     1.9     [12-10-23 @ 06:30]      Phos  2.2     [12-10-23 @ 06:30]          Creatinine Trend:  SCr 1.32 [12-10 @ 06:30]  SCr 1.21 [12-09 @ 05:45]  SCr 1.15 [12-08 @ 07:17]  SCr 1.07 [12-07 @ 06:59]  SCr 1.00 [12-06 @ 13:21]    Tacrolimus (), Serum: 12.1 ng/mL (12-10 @ 06:30)  Tacrolimus (), Serum: 19.4 ng/mL (12-09 @ 05:46)  Tacrolimus (), Serum: 11.5 ng/mL (12-08 @ 07:23)  Tacrolimus (), Serum: 7.4 ng/mL (12-07 @ 07:01)        Urinalysis - [12-10-23 @ 06:30]      Color  / Appearance  / SG  / pH       Gluc 152 / Ketone   / Bili  / Urobili        Blood  / Protein  / Leuk Est  / Nitrite       RBC  / WBC  / Hyaline  / Gran  / Sq Epi  / Non Sq Epi  / Bacteria       Iron 68, TIBC 185, %sat 37      [08-16-23 @ 06:39]  Ferritin 1010      [08-16-23 @ 06:39]  PTH -- (Ca 10.6)      [11-02-23 @ 11:56]   220  Vitamin D (25OH) 18.0      [11-02-23 @ 11:56]

## 2023-12-10 NOTE — PROGRESS NOTE ADULT - ASSESSMENT
52 y/o M with past medical history significant for IDDM, CAD s/p CABG x4v (2015) s/p AICD (2016), PVD (4 stents in RLE), HTN, HLD and ESRD previously on HD via LUE AVF (ligated 9/8/23) now s/p DDRT 7/16/2023 with Simulect -> Thymoglobulin induction. His post-transplant course was complicated by DGF and subtly AMR with +DSAs s/p PLEX/IVIG (8/2023) and L subclavian thrombus remains on Eliquis presents to [facility] ED from wound care clinic on 12/6/23 after concern for osteomyelitis from clinic providers.     [] - Right foot third digit with distal tip fibronecrotic wound to bone with surrounding hyperkeratosis, no drainage, no erythema, no edema. Left foot dorsal medial wound with fibronecrotic wound bed to subQ, moderate malodor, scant serous drainage, with periwound erythema.  - Left foot wound culture growing klebsiella oxytoca, E. Faecalis, M. Morganii, Profidencia Stauartii (prelim)  - Vascular andpodiatry consulted reccs appreciated  - Pod: planning for OR Mon for R 3rd toe amputation/L foot debridement (card/Med) clearence done, as per them cont Eliquis and ASA    - Vascular: #[id number] will plan kim if still need MR angiography with gadolinium contrast or not   - XR concerning for osteomyelitis of both feet  - Transplant ID reccs appreciated   - MRI R/L foot: shows osteomyelitises   - cont Zosyn/Vanco for empiric coverage   -vanco trough 14.5   - Follow up blood cultures sent in ED     [ ] s/p DDRT 7/16/2023   - Baseline Cr ~1.0   - Immuno: ENv hold for supertheraputic level, MMF HELD d/t c/f for osteomyelitis , Pred 10   - PPx: Bactrim, Pepcid   - Leukopenia: Daily CBC w/ diff, holding Valcyte,       [ ] IDDM  - Continue Lantus/Lispro home dose     [ ] CHF, CAD s/p CABG, L SC DVT   - Continue Eliquis, ASA 81   - Continue Coreg, Lipitor          54 y/o M with past medical history significant for IDDM, CAD s/p CABG x4v (2015) s/p AICD (2016), PVD (4 stents in RLE), HTN, HLD and ESRD previously on HD via LUE AVF (ligated 9/8/23) now s/p DDRT 7/16/2023 with Simulect -> Thymoglobulin induction. His post-transplant course was complicated by DGF and subtly AMR with +DSAs s/p PLEX/IVIG (8/2023) and L subclavian thrombus remains on Eliquis presents to [facility] ED from wound care clinic on 12/6/23 after concern for osteomyelitis from clinic providers.     [] - Right foot third digit with distal tip fibronecrotic wound to bone with surrounding hyperkeratosis, no drainage, no erythema, no edema. Left foot dorsal medial wound with fibronecrotic wound bed to subQ, moderate malodor, scant serous drainage, with periwound erythema.  - Left foot wound culture growing klebsiella oxytoca, E. Faecalis, M. Morganii, Profidencia Stauartii (prelim)  - Vascular andpodiatry consulted reccs appreciated  - Pod: planning for OR Mon for R 3rd toe amputation/L foot debridement (card/Med) clearence done, as per them cont Eliquis and ASA    - Vascular: #[id number] will plan kim if still need MR angiography with gadolinium contrast or not   - XR concerning for osteomyelitis of both feet  - Transplant ID reccs appreciated   - MRI R/L foot: shows osteomyelitises   - cont Zosyn/Vanco for empiric coverage   -vanco trough 14.5   - Follow up blood cultures sent in ED     [ ] s/p DDRT 7/16/2023   - Baseline Cr ~1.0   - Immuno: ENv hold for supertheraputic level, MMF HELD d/t c/f for osteomyelitis , Pred 10   - PPx: Bactrim, Pepcid   - Leukopenia: Daily CBC w/ diff, holding Valcyte,       [ ] IDDM  - Continue Lantus/Lispro home dose     [ ] CHF, CAD s/p CABG, L SC DVT   - Continue Eliquis, ASA 81   - Continue Coreg, Lipitor          54 y/o M with past medical history significant for IDDM, CAD s/p CABG x4v (2015) s/p AICD (2016), PVD (4 stents in RLE), HTN, HLD and ESRD previously on HD via LUE AVF (ligated 9/8/23) now s/p DDRT 7/16/2023 with Simulect -> Thymoglobulin induction. His post-transplant course was complicated by DGF and subtly AMR with +DSAs s/p PLEX/IVIG (8/2023) and L subclavian thrombus remains on Eliquis presents to [facility] ED from wound care clinic on 12/6/23 after concern for osteomyelitis from clinic providers.     [] Osteomyelitis of R foot third digit and left foot 1st toe  - Left foot wound culture growing klebsiella oxytoca, E. Faecalis, M. Morganii, Profidencia Stauartii   - OR tomorrow with podiatry for R 3rd toe amputation and L foot debridement  - Vascular team following: plan for intra op angio (tomorrow)  - ID following: reccs appreciated    [ ] s/p DDRT 7/16/2023   - Baseline Cr ~1.0   - Immuno: Env by level,  MMF HELD d/t c/f for osteomyelitis , Pred 10   - PPx: Bactrim, Pepcid   - valcyte held for leukopenia      [ ] IDDM  - Continue Lantus/Lispro home dose     [ ] CHF, CAD s/p CABG, L SC DVT   - Continue Eliquis, ASA 81   - Continue Coreg, Lipitor          52 y/o M with past medical history significant for IDDM, CAD s/p CABG x4v (2015) s/p AICD (2016), PVD (4 stents in RLE), HTN, HLD and ESRD previously on HD via LUE AVF (ligated 9/8/23) now s/p DDRT 7/16/2023 with Simulect -> Thymoglobulin induction. His post-transplant course was complicated by DGF and subtly AMR with +DSAs s/p PLEX/IVIG (8/2023) and L subclavian thrombus remains on Eliquis presents to [facility] ED from wound care clinic on 12/6/23 after concern for osteomyelitis from clinic providers.     [] Osteomyelitis of R foot third digit and left foot 1st toe  - Left foot wound culture growing klebsiella oxytoca, E. Faecalis, M. Morganii, Profidencia Stauartii   - OR tomorrow with podiatry for R 3rd toe amputation and L foot debridement  - Vascular team following: plan for intra op angio (tomorrow)  - ID following: reccs appreciated    [ ] s/p DDRT 7/16/2023   - Baseline Cr ~1.0   - Immuno: Env by level,  MMF HELD d/t c/f for osteomyelitis , Pred 10   - PPx: Bactrim, Pepcid   - valcyte held for leukopenia      [ ] IDDM  - Continue Lantus/Lispro home dose     [ ] CHF, CAD s/p CABG, L SC DVT   - Continue Eliquis, ASA 81   - Continue Coreg, Lipitor

## 2023-12-10 NOTE — PROGRESS NOTE ADULT - ASSESSMENT
ASSESSMENT: 54 y/o M with PMH of HTN, CHF (s/p AICD 2016), CAD (s/p CABG 2015), IDDM, PVD s/p stent x4 in RLE with R big toe/second toe amputation (2021) and ESRD on HD via LUE AVF for 6 years now s/p DDRT 7/16/2023 presenting to the ED with infected R 3rd toe and left 1st metatarsal ulcers. Patient follows with Dr. Malone of Vascular Surgery. Patient states he has had these wounds for a while, follows with Podiatry. However, states that over the past few weeks, they have begun to look worse, specifically the left 1st metatarsal ulcer. Vascular surgery consulted for evaluation.    Discussed plan with attending Dr. Malone. Dr. Malone would like podiatry to proceed to OR on 12/11 for their planned procedures. Based on the findings intraoperatively, plan for angiographic imaging will be made. Podiatry should proceed as planned with planned procedures.    Vascular  [id number]. ASSESSMENT: 52 y/o M with PMH of HTN, CHF (s/p AICD 2016), CAD (s/p CABG 2015), IDDM, PVD s/p stent x4 in RLE with R big toe/second toe amputation (2021) and ESRD on HD via LUE AVF for 6 years now s/p DDRT 7/16/2023 presenting to the ED with infected R 3rd toe and left 1st metatarsal ulcers. Patient follows with Dr. Malone of Vascular Surgery. Patient states he has had these wounds for a while, follows with Podiatry. However, states that over the past few weeks, they have begun to look worse, specifically the left 1st metatarsal ulcer. Vascular surgery consulted for evaluation.    Discussed plan with attending Dr. Malone. Dr. Malone would like podiatry to proceed to OR on 12/11 for their planned procedures. Based on the findings intraoperatively, plan for angiographic imaging will be made. Podiatry should proceed as planned with planned procedures.    Vascular  [id number].

## 2023-12-10 NOTE — PROGRESS NOTE ADULT - SUBJECTIVE AND OBJECTIVE BOX
Transplant Surgery - Multidisciplinary Progress Note  --------------------------------------------------------------  DDRT 7/16/2023    Present: Patient seen and examined with multidisciplinary Transplant team including: Surgeon: Dr. Simons, Nephrologist: Dr. Mickie BAI, SHERIE Prakash, and bedside RN during AM rounds.   Disciplines not in attendance will be notified of the plan.      HPI:  54 y/o M with past medical history significant for HTN, CHF (s/p AICD 2016--last interrogated 7/15/23), CAD (s/p CABG 2015), IDDM, PVD s/p stent x4 in RLE with R big toe/second toe amputation (2021) and ESRD on HD via LUE AVF for 6 years now s/p DDRT 7/16/2023 with Thymoglobulin induction. Post-transplant course complicated by severe constipation requiring disimpaction, DGF requiring hemodialysis and LUE swelling with concern for L subclavian thrombus on ELiquius, now s/p IR fistulogram 7/7/23 with balloon angioplasty of subclavian vein. He was discharged home on 7/27/23     Presented to [facility] ED from wound care clinic on 12/6/23 after concern for osteomyelitis from clinic providers.     Interval Events:  - switched to imipenem  - Env held for supratherapeutic level    Immunosuppression:  Envarsus held, MMF held, Prednisone 10 mg daily    Potential Discharge date: TBD  Plan of care: see below      MEDICATIONS  (STANDING):  apixaban 5 milliGRAM(s) Oral every 12 hours  aspirin enteric coated 81 milliGRAM(s) Oral daily  atorvastatin 40 milliGRAM(s) Oral at bedtime  carvedilol 3.125 milliGRAM(s) Oral every 12 hours  famotidine    Tablet 20 milliGRAM(s) Oral daily  imipenem/cilastatin  IVPB 1000 milliGRAM(s) IV Intermittent every 8 hours  insulin glargine Injectable (LANTUS) 10 Unit(s) SubCutaneous at bedtime  insulin lispro (ADMELOG) corrective regimen sliding scale   SubCutaneous three times a day before meals  insulin lispro (ADMELOG) corrective regimen sliding scale   SubCutaneous at bedtime  insulin lispro Injectable (ADMELOG) 10 Unit(s) SubCutaneous three times a day with meals  predniSONE   Tablet 10 milliGRAM(s) Oral daily  tacrolimus ER Tablet (ENVARSUS XR) 2 milliGRAM(s) Oral once  trimethoprim   80 mG/sulfamethoxazole 400 mG 1 Tablet(s) Oral daily    PAST MEDICAL & SURGICAL HISTORY:  Diabetes mellitus  Foot ulcer due to secondary DM  Coronary artery disease  Acute on chronic systolic heart failure  H/O kidney transplant  Breast Reduction  Toe amputation status, left  S/P CABG (coronary artery bypass graft)  AICD (automatic cardioverter/defibrillator) present    Vital Signs Last 24 Hrs  T(C): 36.7 (10 Dec 2023 08:39), Max: 36.7 (09 Dec 2023 13:00)  T(F): 98.1 (10 Dec 2023 08:39), Max: 98.1 (09 Dec 2023 13:00)  HR: 79 (10 Dec 2023 08:39) (72 - 81)  BP: 156/80 (10 Dec 2023 08:39) (138/73 - 165/77)  BP(mean): --  RR: 18 (10 Dec 2023 08:39) (18 - 18)  SpO2: 99% (10 Dec 2023 08:39) (99% - 100%)    Parameters below as of 10 Dec 2023 08:39  Patient On (Oxygen Delivery Method): room air    I&O's Summary    09 Dec 2023 07:01  -  10 Dec 2023 07:00  --------------------------------------------------------  IN: 360 mL / OUT: 1325 mL / NET: -965 mL    10 Dec 2023 07:01  -  10 Dec 2023 12:38  --------------------------------------------------------  IN: 240 mL / OUT: 750 mL / NET: -510 mL                        10.0   2.65  )-----------( 176      ( 10 Dec 2023 06:30 )             32.4     12-10    138  |  108  |  23  ----------------------------<  152<H>  4.8   |  22  |  1.32<H>    Ca    10.3      10 Dec 2023 06:30  Phos  2.2     12-10  Mg     1.9     12-10      Tacrolimus (), Serum: 12.1 ng/mL (12-10 @ 06:30)      Culture - Abscess with Gram Stain (collected 12-06-23 @ 16:59)  Source: .Abscess left foot  Preliminary Report (12-08-23 @ 19:58):    Numerous Klebsiella pneumoniae ESBL    Numerous Pseudomonas aeruginosa    Numerous Enterococcus faecalis Susceptibility to follow.    Normal skin linsey isolated  Organism: Klebsiella pneumoniae ESBL  Pseudomonas aeruginosa (12-08-23 @ 18:51)  Organism: Pseudomonas aeruginosa (12-08-23 @ 18:51)  Organism: Klebsiella pneumoniae ESBL (12-08-23 @ 18:51)    Culture - Blood (collected 12-06-23 @ 13:08)  Source: .Blood Blood  Preliminary Report (12-09-23 @ 18:01):    No growth at 72 Hours    Culture - Blood (collected 12-06-23 @ 12:40)  Source: .Blood Blood  Preliminary Report (12-09-23 @ 18:01):    No growth at 72 Hours      Review of systems  Gen: No weight changes, fatigue, fevers/chills, weakness  Skin: No rashes  Head/Eyes/Ears/Mouth: No headache; Normal hearing; Normal vision w/o blurriness; No sinus pain/discomfort, sore throat  Respiratory: No dyspnea, cough, wheezing, hemoptysis  CV: No chest pain, PND, orthopnea  GI: no abdominal pain, denies diarrhea, constipation, nausea, vomiting, melena, hematochezia  : No increased frequency, dysuria, hematuria, nocturia  MSK: No joint pain/swelling; no back pain; no edema  Neuro: No dizziness/lightheadedness, weakness, seizures, numbness, tingling  Heme: No easy bruising or bleeding  Endo: No heat/cold intolerance  Psych: No significant nervousness, anxiety, stress, depression  All other systems were reviewed and are negative, except as noted.     PHYSICAL EXAM:  Constitutional: Well developed / well nourished  Eyes: Anicteric, PERRLA  ENMT: nc/at  Neck: supple  Respiratory: CTA B/L  Cardiovascular: RRR  Gastrointestinal: Soft, ND/NT   Genitourinary: Voiding spontaneously  Extremities: SCD's in place and working bilaterally  Vascular: Palpable dp pulses bilaterally, b/l LE foot wound dressing in place  Neurological: A&O x3  Skin: no rashes, ulcerations or lesions;  Musculoskeletal: Moving all extremities  Psychiatric: Responsive Transplant Surgery - Multidisciplinary Progress Note  --------------------------------------------------------------  DDRT 7/16/2023    Present: Patient seen and examined with multidisciplinary Transplant team including: Surgeon: Dr. Simons, Nephrologist: Dr. Mickie BAI, SHERIE Prakash, and bedside RN during AM rounds.   Disciplines not in attendance will be notified of the plan.      HPI:  54 y/o M with past medical history significant for HTN, CHF (s/p AICD 2016--last interrogated 7/15/23), CAD (s/p CABG 2015), IDDM, PVD s/p stent x4 in RLE with R big toe/second toe amputation (2021) and ESRD on HD via LUE AVF for 6 years now s/p DDRT 7/16/2023 with Thymoglobulin induction. Post-transplant course complicated by severe constipation requiring disimpaction, DGF requiring hemodialysis and LUE swelling with concern for L subclavian thrombus on ELiquius, now s/p IR fistulogram 7/7/23 with balloon angioplasty of subclavian vein. He was discharged home on 7/27/23     Presented to [facility] ED from wound care clinic on 12/6/23 after concern for osteomyelitis from clinic providers.     Interval Events:  - switched to imipenem  - Env held for supratherapeutic level    Immunosuppression:  Envarsus held, MMF held, Prednisone 10 mg daily    Potential Discharge date: TBD  Plan of care: see below      MEDICATIONS  (STANDING):  apixaban 5 milliGRAM(s) Oral every 12 hours  aspirin enteric coated 81 milliGRAM(s) Oral daily  atorvastatin 40 milliGRAM(s) Oral at bedtime  carvedilol 3.125 milliGRAM(s) Oral every 12 hours  famotidine    Tablet 20 milliGRAM(s) Oral daily  imipenem/cilastatin  IVPB 1000 milliGRAM(s) IV Intermittent every 8 hours  insulin glargine Injectable (LANTUS) 10 Unit(s) SubCutaneous at bedtime  insulin lispro (ADMELOG) corrective regimen sliding scale   SubCutaneous three times a day before meals  insulin lispro (ADMELOG) corrective regimen sliding scale   SubCutaneous at bedtime  insulin lispro Injectable (ADMELOG) 10 Unit(s) SubCutaneous three times a day with meals  predniSONE   Tablet 10 milliGRAM(s) Oral daily  tacrolimus ER Tablet (ENVARSUS XR) 2 milliGRAM(s) Oral once  trimethoprim   80 mG/sulfamethoxazole 400 mG 1 Tablet(s) Oral daily    PAST MEDICAL & SURGICAL HISTORY:  Diabetes mellitus  Foot ulcer due to secondary DM  Coronary artery disease  Acute on chronic systolic heart failure  H/O kidney transplant  Breast Reduction  Toe amputation status, left  S/P CABG (coronary artery bypass graft)  AICD (automatic cardioverter/defibrillator) present    Vital Signs Last 24 Hrs  T(C): 36.7 (10 Dec 2023 08:39), Max: 36.7 (09 Dec 2023 13:00)  T(F): 98.1 (10 Dec 2023 08:39), Max: 98.1 (09 Dec 2023 13:00)  HR: 79 (10 Dec 2023 08:39) (72 - 81)  BP: 156/80 (10 Dec 2023 08:39) (138/73 - 165/77)  BP(mean): --  RR: 18 (10 Dec 2023 08:39) (18 - 18)  SpO2: 99% (10 Dec 2023 08:39) (99% - 100%)    Parameters below as of 10 Dec 2023 08:39  Patient On (Oxygen Delivery Method): room air    I&O's Summary    09 Dec 2023 07:01  -  10 Dec 2023 07:00  --------------------------------------------------------  IN: 360 mL / OUT: 1325 mL / NET: -965 mL    10 Dec 2023 07:01  -  10 Dec 2023 12:38  --------------------------------------------------------  IN: 240 mL / OUT: 750 mL / NET: -510 mL                        10.0   2.65  )-----------( 176      ( 10 Dec 2023 06:30 )             32.4     12-10    138  |  108  |  23  ----------------------------<  152<H>  4.8   |  22  |  1.32<H>    Ca    10.3      10 Dec 2023 06:30  Phos  2.2     12-10  Mg     1.9     12-10      Tacrolimus (), Serum: 12.1 ng/mL (12-10 @ 06:30)      Culture - Abscess with Gram Stain (collected 12-06-23 @ 16:59)  Source: .Abscess left foot  Preliminary Report (12-08-23 @ 19:58):    Numerous Klebsiella pneumoniae ESBL    Numerous Pseudomonas aeruginosa    Numerous Enterococcus faecalis Susceptibility to follow.    Normal skin linsey isolated  Organism: Klebsiella pneumoniae ESBL  Pseudomonas aeruginosa (12-08-23 @ 18:51)  Organism: Pseudomonas aeruginosa (12-08-23 @ 18:51)  Organism: Klebsiella pneumoniae ESBL (12-08-23 @ 18:51)    Culture - Blood (collected 12-06-23 @ 13:08)  Source: .Blood Blood  Preliminary Report (12-09-23 @ 18:01):    No growth at 72 Hours    Culture - Blood (collected 12-06-23 @ 12:40)  Source: .Blood Blood  Preliminary Report (12-09-23 @ 18:01):    No growth at 72 Hours      Review of systems  Gen: No weight changes, fatigue, fevers/chills, weakness  Skin: No rashes  Head/Eyes/Ears/Mouth: No headache; Normal hearing; Normal vision w/o blurriness; No sinus pain/discomfort, sore throat  Respiratory: No dyspnea, cough, wheezing, hemoptysis  CV: No chest pain, PND, orthopnea  GI: no abdominal pain, denies diarrhea, constipation, nausea, vomiting, melena, hematochezia  : No increased frequency, dysuria, hematuria, nocturia  MSK: No joint pain/swelling; no back pain; no edema  Neuro: No dizziness/lightheadedness, weakness, seizures, numbness, tingling  Heme: No easy bruising or bleeding  Endo: No heat/cold intolerance  Psych: No significant nervousness, anxiety, stress, depression  All other systems were reviewed and are negative, except as noted.     PHYSICAL EXAM:  Constitutional: Well developed / well nourished  Eyes: Anicteric, PERRLA  ENMT: nc/at  Neck: supple  Respiratory: CTA B/L  Cardiovascular: RRR  Gastrointestinal: Soft, ND/NT   Genitourinary: Voiding spontaneously  Extremities: SCD's in place and working bilaterally  Vascular: Palpable dp pulses bilaterally, b/l LE foot wound dressing in place  Neurological: A&O x3  Skin: no rashes, ulcerations or lesions;  Musculoskeletal: Moving all extremities  Psychiatric: Responsive

## 2023-12-10 NOTE — PROGRESS NOTE ADULT - SUBJECTIVE AND OBJECTIVE BOX
SUBJECTIVE: Patient seen and examined on AM rounds. Patient reports that they're feeling well. Denies fever, chills. Reports pain as controlled. No complaints at this time.     Vital Signs Last 24 Hrs  T(C): 36.8 (10 Dec 2023 12:45), Max: 36.8 (10 Dec 2023 12:45)  T(F): 98.2 (10 Dec 2023 12:45), Max: 98.2 (10 Dec 2023 12:45)  HR: 74 (10 Dec 2023 12:45) (72 - 79)  BP: 156/66 (10 Dec 2023 12:45) (138/73 - 163/81)  BP(mean): --  RR: 18 (10 Dec 2023 12:45) (18 - 18)  SpO2: 98% (10 Dec 2023 12:45) (98% - 100%)    Parameters below as of 10 Dec 2023 12:45  Patient On (Oxygen Delivery Method): room air        General Appearance: Appears well, NAD  Neck: Supple  Chest: Equal expansion bilaterally  CV: Pulse regular presently  Abdomen: Soft, nontense  Extremities: Wounds dressed, c/d/i    I&O's Summary    09 Dec 2023 07:01  -  10 Dec 2023 07:00  --------------------------------------------------------  IN: 360 mL / OUT: 1325 mL / NET: -965 mL    10 Dec 2023 07:01  -  10 Dec 2023 15:55  --------------------------------------------------------  IN: 240 mL / OUT: 1200 mL / NET: -960 mL      I&O's Detail    09 Dec 2023 07:01  -  10 Dec 2023 07:00  --------------------------------------------------------  IN:    Oral Fluid: 360 mL  Total IN: 360 mL    OUT:    Voided (mL): 1325 mL  Total OUT: 1325 mL    Total NET: -965 mL      10 Dec 2023 07:01  -  10 Dec 2023 15:55  --------------------------------------------------------  IN:    Oral Fluid: 240 mL  Total IN: 240 mL    OUT:    Voided (mL): 1200 mL  Total OUT: 1200 mL    Total NET: -960 mL          LABS:                        10.0   2.65  )-----------( 176      ( 10 Dec 2023 06:30 )             32.4     12-10    138  |  108  |  23  ----------------------------<  152<H>  4.8   |  22  |  1.32<H>    Ca    10.3      10 Dec 2023 06:30  Phos  2.2     12-10  Mg     1.9     12-10        Urinalysis Basic - ( 10 Dec 2023 06:30 )    Color: x / Appearance: x / SG: x / pH: x  Gluc: 152 mg/dL / Ketone: x  / Bili: x / Urobili: x   Blood: x / Protein: x / Nitrite: x   Leuk Esterase: x / RBC: x / WBC x   Sq Epi: x / Non Sq Epi: x / Bacteria: x        RADIOLOGY & ADDITIONAL STUDIES:

## 2023-12-10 NOTE — PROGRESS NOTE ADULT - SUBJECTIVE AND OBJECTIVE BOX
Patient is a 53y old  Male who presents with a chief complaint of c/f osteomyelitis (10 Dec 2023 09:56)       INTERVAL HPI/OVERNIGHT EVENTS:  Patient seen and evaluated at bedside.  Pt is resting comfortable in NAD. Denies N/V/F/C.    Allergies    Contrast. (Unknown)    Intolerances        Vital Signs Last 24 Hrs  T(C): 36.7 (10 Dec 2023 08:39), Max: 36.7 (09 Dec 2023 13:00)  T(F): 98.1 (10 Dec 2023 08:39), Max: 98.1 (09 Dec 2023 13:00)  HR: 79 (10 Dec 2023 08:39) (72 - 81)  BP: 156/80 (10 Dec 2023 08:39) (138/73 - 165/77)  BP(mean): --  RR: 18 (10 Dec 2023 08:39) (18 - 18)  SpO2: 99% (10 Dec 2023 08:39) (99% - 100%)    Parameters below as of 10 Dec 2023 08:39  Patient On (Oxygen Delivery Method): room air        LABS:                        10.0   2.65  )-----------( 176      ( 10 Dec 2023 06:30 )             32.4     12-10    138  |  108  |  23  ----------------------------<  152<H>  4.8   |  22  |  1.32<H>    Ca    10.3      10 Dec 2023 06:30  Phos  2.2     12-10  Mg     1.9     12-10        Urinalysis Basic - ( 10 Dec 2023 06:30 )    Color: x / Appearance: x / SG: x / pH: x  Gluc: 152 mg/dL / Ketone: x  / Bili: x / Urobili: x   Blood: x / Protein: x / Nitrite: x   Leuk Esterase: x / RBC: x / WBC x   Sq Epi: x / Non Sq Epi: x / Bacteria: x      CAPILLARY BLOOD GLUCOSE      POCT Blood Glucose.: 155 mg/dL (10 Dec 2023 08:27)  POCT Blood Glucose.: 112 mg/dL (09 Dec 2023 21:35)  POCT Blood Glucose.: 142 mg/dL (09 Dec 2023 17:45)  POCT Blood Glucose.: 210 mg/dL (09 Dec 2023 12:31)      Lower Extremity Physical Exam:  Vascular: DP/PT 0/4 B/L, CFT <3 seconds B/L, Temperature gradient warm to cool b/l  Neuro: Epicritic sensation absent to the level of digits B/L  Musculoskeletal/Ortho: s/p right foot partial 1st ray resection, s/p right foot partial 2nd toe amputation, s/p left foot 5th MPJ resection  skin:  Right foot third digit with distal tip fibronecrotic wound to bone with surrounding hyperkeratosis, no drainage, no erythema, no edema. Left foot dorsal medial wound with fibronecrotic wound bed to subQ, moderate malodor, scant serous drainage, with periwound erythema.      RADIOLOGY & ADDITIONAL TESTS:

## 2023-12-10 NOTE — PROGRESS NOTE ADULT - SUBJECTIVE AND OBJECTIVE BOX
Subjective: Patient seen and examined. No new events except as noted.     SUBJECTIVE/ROS:  nad      MEDICATIONS:  MEDICATIONS  (STANDING):  apixaban 5 milliGRAM(s) Oral every 12 hours  aspirin enteric coated 81 milliGRAM(s) Oral daily  atorvastatin 40 milliGRAM(s) Oral at bedtime  carvedilol 3.125 milliGRAM(s) Oral every 12 hours  famotidine    Tablet 20 milliGRAM(s) Oral daily  imipenem/cilastatin  IVPB 1000 milliGRAM(s) IV Intermittent every 8 hours  insulin glargine Injectable (LANTUS) 10 Unit(s) SubCutaneous at bedtime  insulin lispro (ADMELOG) corrective regimen sliding scale   SubCutaneous at bedtime  insulin lispro (ADMELOG) corrective regimen sliding scale   SubCutaneous three times a day before meals  insulin lispro Injectable (ADMELOG) 10 Unit(s) SubCutaneous three times a day with meals  predniSONE   Tablet 10 milliGRAM(s) Oral daily  trimethoprim   80 mG/sulfamethoxazole 400 mG 1 Tablet(s) Oral daily      PHYSICAL EXAM:  T(C): 36.7 (12-10-23 @ 05:00), Max: 36.7 (12-09-23 @ 13:00)  HR: 72 (12-10-23 @ 05:00) (72 - 81)  BP: 150/84 (12-10-23 @ 05:00) (127/79 - 165/77)  RR: 18 (12-10-23 @ 05:00) (18 - 18)  SpO2: 99% (12-10-23 @ 05:00) (99% - 100%)  Wt(kg): --  I&O's Summary    09 Dec 2023 07:01  -  10 Dec 2023 07:00  --------------------------------------------------------  IN: 360 mL / OUT: 1325 mL / NET: -965 mL            JVP: Normal  Neck: supple  Lung: clear   CV: S1 S2 , Murmur:  Abd: soft  Ext: No edema  neuro: Awake  Psych: flat affect  Skin: normal``    LABS/DATA:    CARDIAC MARKERS:                                10.0   2.65  )-----------( 176      ( 10 Dec 2023 06:30 )             32.4     12-10    138  |  108  |  23  ----------------------------<  152<H>  4.8   |  22  |  1.32<H>    Ca    10.3      10 Dec 2023 06:30  Phos  2.2     12-10  Mg     1.9     12-10      proBNP:   Lipid Profile:   HgA1c:   TSH:     TELE:  EKG:

## 2023-12-11 ENCOUNTER — TRANSCRIPTION ENCOUNTER (OUTPATIENT)
Age: 53
End: 2023-12-11

## 2023-12-11 ENCOUNTER — RESULT REVIEW (OUTPATIENT)
Age: 53
End: 2023-12-11

## 2023-12-11 ENCOUNTER — APPOINTMENT (OUTPATIENT)
Dept: NEPHROLOGY | Facility: CLINIC | Age: 53
End: 2023-12-11

## 2023-12-11 LAB
-  AMPICILLIN: 0.75 — SIGNIFICANT CHANGE UP
-  AMPICILLIN: 0.75 — SIGNIFICANT CHANGE UP
-  TETRACYCLINE: 0.5 — SIGNIFICANT CHANGE UP
-  TETRACYCLINE: 0.5 — SIGNIFICANT CHANGE UP
-  TIGECYCLINE: SIGNIFICANT CHANGE UP
-  VANCOMYCIN: SIGNIFICANT CHANGE UP
-  VANCOMYCIN: SIGNIFICANT CHANGE UP
ANION GAP SERPL CALC-SCNC: 8 MMOL/L — SIGNIFICANT CHANGE UP (ref 5–17)
ANION GAP SERPL CALC-SCNC: 8 MMOL/L — SIGNIFICANT CHANGE UP (ref 5–17)
APTT BLD: 37.1 SEC — HIGH (ref 24.5–35.6)
APTT BLD: 37.1 SEC — HIGH (ref 24.5–35.6)
BASOPHILS # BLD AUTO: 0.04 K/UL — SIGNIFICANT CHANGE UP (ref 0–0.2)
BASOPHILS # BLD AUTO: 0.04 K/UL — SIGNIFICANT CHANGE UP (ref 0–0.2)
BASOPHILS NFR BLD AUTO: 1.4 % — SIGNIFICANT CHANGE UP (ref 0–2)
BASOPHILS NFR BLD AUTO: 1.4 % — SIGNIFICANT CHANGE UP (ref 0–2)
BLD GP AB SCN SERPL QL: NEGATIVE — SIGNIFICANT CHANGE UP
BLD GP AB SCN SERPL QL: NEGATIVE — SIGNIFICANT CHANGE UP
BUN SERPL-MCNC: 18 MG/DL — SIGNIFICANT CHANGE UP (ref 7–23)
BUN SERPL-MCNC: 18 MG/DL — SIGNIFICANT CHANGE UP (ref 7–23)
CALCIUM SERPL-MCNC: 10.7 MG/DL — HIGH (ref 8.4–10.5)
CALCIUM SERPL-MCNC: 10.7 MG/DL — HIGH (ref 8.4–10.5)
CHLORIDE SERPL-SCNC: 104 MMOL/L — SIGNIFICANT CHANGE UP (ref 96–108)
CHLORIDE SERPL-SCNC: 104 MMOL/L — SIGNIFICANT CHANGE UP (ref 96–108)
CO2 SERPL-SCNC: 24 MMOL/L — SIGNIFICANT CHANGE UP (ref 22–31)
CO2 SERPL-SCNC: 24 MMOL/L — SIGNIFICANT CHANGE UP (ref 22–31)
CREAT SERPL-MCNC: 1.3 MG/DL — SIGNIFICANT CHANGE UP (ref 0.5–1.3)
CREAT SERPL-MCNC: 1.3 MG/DL — SIGNIFICANT CHANGE UP (ref 0.5–1.3)
CULTURE RESULTS: ABNORMAL
CULTURE RESULTS: ABNORMAL
CULTURE RESULTS: SIGNIFICANT CHANGE UP
EGFR: 66 ML/MIN/1.73M2 — SIGNIFICANT CHANGE UP
EGFR: 66 ML/MIN/1.73M2 — SIGNIFICANT CHANGE UP
EOSINOPHIL # BLD AUTO: 0 K/UL — SIGNIFICANT CHANGE UP (ref 0–0.5)
EOSINOPHIL # BLD AUTO: 0 K/UL — SIGNIFICANT CHANGE UP (ref 0–0.5)
EOSINOPHIL NFR BLD AUTO: 0 % — SIGNIFICANT CHANGE UP (ref 0–6)
EOSINOPHIL NFR BLD AUTO: 0 % — SIGNIFICANT CHANGE UP (ref 0–6)
GLUCOSE BLDC GLUCOMTR-MCNC: 140 MG/DL — HIGH (ref 70–99)
GLUCOSE BLDC GLUCOMTR-MCNC: 140 MG/DL — HIGH (ref 70–99)
GLUCOSE BLDC GLUCOMTR-MCNC: 165 MG/DL — HIGH (ref 70–99)
GLUCOSE BLDC GLUCOMTR-MCNC: 165 MG/DL — HIGH (ref 70–99)
GLUCOSE BLDC GLUCOMTR-MCNC: 168 MG/DL — HIGH (ref 70–99)
GLUCOSE BLDC GLUCOMTR-MCNC: 168 MG/DL — HIGH (ref 70–99)
GLUCOSE BLDC GLUCOMTR-MCNC: 225 MG/DL — HIGH (ref 70–99)
GLUCOSE BLDC GLUCOMTR-MCNC: 225 MG/DL — HIGH (ref 70–99)
GLUCOSE BLDC GLUCOMTR-MCNC: 98 MG/DL — SIGNIFICANT CHANGE UP (ref 70–99)
GLUCOSE BLDC GLUCOMTR-MCNC: 98 MG/DL — SIGNIFICANT CHANGE UP (ref 70–99)
GLUCOSE SERPL-MCNC: 99 MG/DL — SIGNIFICANT CHANGE UP (ref 70–99)
GLUCOSE SERPL-MCNC: 99 MG/DL — SIGNIFICANT CHANGE UP (ref 70–99)
HCT VFR BLD CALC: 34.7 % — LOW (ref 39–50)
HCT VFR BLD CALC: 34.7 % — LOW (ref 39–50)
HGB BLD-MCNC: 11 G/DL — LOW (ref 13–17)
HGB BLD-MCNC: 11 G/DL — LOW (ref 13–17)
IMM GRANULOCYTES NFR BLD AUTO: 10.5 % — HIGH (ref 0–0.9)
IMM GRANULOCYTES NFR BLD AUTO: 10.5 % — HIGH (ref 0–0.9)
INR BLD: 1.29 RATIO — HIGH (ref 0.85–1.18)
INR BLD: 1.29 RATIO — HIGH (ref 0.85–1.18)
LYMPHOCYTES # BLD AUTO: 0.29 K/UL — LOW (ref 1–3.3)
LYMPHOCYTES # BLD AUTO: 0.29 K/UL — LOW (ref 1–3.3)
LYMPHOCYTES # BLD AUTO: 10.2 % — LOW (ref 13–44)
LYMPHOCYTES # BLD AUTO: 10.2 % — LOW (ref 13–44)
MAGNESIUM SERPL-MCNC: 1.8 MG/DL — SIGNIFICANT CHANGE UP (ref 1.6–2.6)
MAGNESIUM SERPL-MCNC: 1.8 MG/DL — SIGNIFICANT CHANGE UP (ref 1.6–2.6)
MCHC RBC-ENTMCNC: 28.4 PG — SIGNIFICANT CHANGE UP (ref 27–34)
MCHC RBC-ENTMCNC: 28.4 PG — SIGNIFICANT CHANGE UP (ref 27–34)
MCHC RBC-ENTMCNC: 31.7 GM/DL — LOW (ref 32–36)
MCHC RBC-ENTMCNC: 31.7 GM/DL — LOW (ref 32–36)
MCV RBC AUTO: 89.4 FL — SIGNIFICANT CHANGE UP (ref 80–100)
MCV RBC AUTO: 89.4 FL — SIGNIFICANT CHANGE UP (ref 80–100)
METHOD TYPE: SIGNIFICANT CHANGE UP
MONOCYTES # BLD AUTO: 0.49 K/UL — SIGNIFICANT CHANGE UP (ref 0–0.9)
MONOCYTES # BLD AUTO: 0.49 K/UL — SIGNIFICANT CHANGE UP (ref 0–0.9)
MONOCYTES NFR BLD AUTO: 17.2 % — HIGH (ref 2–14)
MONOCYTES NFR BLD AUTO: 17.2 % — HIGH (ref 2–14)
MYCOPHENOLATE SERPL-MCNC: 0.6 UG/ML — LOW (ref 1–3.5)
MYCOPHENOLATE SERPL-MCNC: 0.6 UG/ML — LOW (ref 1–3.5)
MYCOPHENOLIC ACID GLUCURONIDE: 26 UG/ML — SIGNIFICANT CHANGE UP (ref 15–125)
MYCOPHENOLIC ACID GLUCURONIDE: 26 UG/ML — SIGNIFICANT CHANGE UP (ref 15–125)
NEUTROPHILS # BLD AUTO: 1.73 K/UL — LOW (ref 1.8–7.4)
NEUTROPHILS # BLD AUTO: 1.73 K/UL — LOW (ref 1.8–7.4)
NEUTROPHILS NFR BLD AUTO: 60.7 % — SIGNIFICANT CHANGE UP (ref 43–77)
NEUTROPHILS NFR BLD AUTO: 60.7 % — SIGNIFICANT CHANGE UP (ref 43–77)
NRBC # BLD: 0 /100 WBCS — SIGNIFICANT CHANGE UP (ref 0–0)
NRBC # BLD: 0 /100 WBCS — SIGNIFICANT CHANGE UP (ref 0–0)
ORGANISM # SPEC MICROSCOPIC CNT: ABNORMAL
PHOSPHATE SERPL-MCNC: 2.2 MG/DL — LOW (ref 2.5–4.5)
PHOSPHATE SERPL-MCNC: 2.2 MG/DL — LOW (ref 2.5–4.5)
PLATELET # BLD AUTO: 207 K/UL — SIGNIFICANT CHANGE UP (ref 150–400)
PLATELET # BLD AUTO: 207 K/UL — SIGNIFICANT CHANGE UP (ref 150–400)
POTASSIUM SERPL-MCNC: 4.6 MMOL/L — SIGNIFICANT CHANGE UP (ref 3.5–5.3)
POTASSIUM SERPL-MCNC: 4.6 MMOL/L — SIGNIFICANT CHANGE UP (ref 3.5–5.3)
POTASSIUM SERPL-SCNC: 4.6 MMOL/L — SIGNIFICANT CHANGE UP (ref 3.5–5.3)
POTASSIUM SERPL-SCNC: 4.6 MMOL/L — SIGNIFICANT CHANGE UP (ref 3.5–5.3)
PROTHROM AB SERPL-ACNC: 13.4 SEC — HIGH (ref 9.5–13)
PROTHROM AB SERPL-ACNC: 13.4 SEC — HIGH (ref 9.5–13)
RBC # BLD: 3.88 M/UL — LOW (ref 4.2–5.8)
RBC # BLD: 3.88 M/UL — LOW (ref 4.2–5.8)
RBC # FLD: 13 % — SIGNIFICANT CHANGE UP (ref 10.3–14.5)
RBC # FLD: 13 % — SIGNIFICANT CHANGE UP (ref 10.3–14.5)
RH IG SCN BLD-IMP: POSITIVE — SIGNIFICANT CHANGE UP
RH IG SCN BLD-IMP: POSITIVE — SIGNIFICANT CHANGE UP
SODIUM SERPL-SCNC: 136 MMOL/L — SIGNIFICANT CHANGE UP (ref 135–145)
SODIUM SERPL-SCNC: 136 MMOL/L — SIGNIFICANT CHANGE UP (ref 135–145)
SPECIMEN SOURCE: SIGNIFICANT CHANGE UP
TACROLIMUS SERPL-MCNC: 12 NG/ML — SIGNIFICANT CHANGE UP
TACROLIMUS SERPL-MCNC: 12 NG/ML — SIGNIFICANT CHANGE UP
WBC # BLD: 2.85 K/UL — LOW (ref 3.8–10.5)
WBC # BLD: 2.85 K/UL — LOW (ref 3.8–10.5)
WBC # FLD AUTO: 2.85 K/UL — LOW (ref 3.8–10.5)
WBC # FLD AUTO: 2.85 K/UL — LOW (ref 3.8–10.5)

## 2023-12-11 PROCEDURE — 88311 DECALCIFY TISSUE: CPT | Mod: 26

## 2023-12-11 PROCEDURE — 99232 SBSQ HOSP IP/OBS MODERATE 35: CPT

## 2023-12-11 PROCEDURE — 99232 SBSQ HOSP IP/OBS MODERATE 35: CPT | Mod: GC

## 2023-12-11 PROCEDURE — 88305 TISSUE EXAM BY PATHOLOGIST: CPT | Mod: 26

## 2023-12-11 PROCEDURE — 73630 X-RAY EXAM OF FOOT: CPT | Mod: 26,RT

## 2023-12-11 DEVICE — GRAFT FISH SKIN OMEGA3 WOUND 5X7CM: Type: IMPLANTABLE DEVICE | Site: LEFT, RIGHT | Status: FUNCTIONAL

## 2023-12-11 RX ORDER — ASPIRIN/CALCIUM CARB/MAGNESIUM 324 MG
81 TABLET ORAL DAILY
Refills: 0 | Status: DISCONTINUED | OUTPATIENT
Start: 2023-12-11 | End: 2023-12-16

## 2023-12-11 RX ORDER — SODIUM CHLORIDE 9 MG/ML
1000 INJECTION, SOLUTION INTRAVENOUS
Refills: 0 | Status: DISCONTINUED | OUTPATIENT
Start: 2023-12-11 | End: 2023-12-11

## 2023-12-11 RX ORDER — HYDROMORPHONE HYDROCHLORIDE 2 MG/ML
0.5 INJECTION INTRAMUSCULAR; INTRAVENOUS; SUBCUTANEOUS
Refills: 0 | Status: DISCONTINUED | OUTPATIENT
Start: 2023-12-11 | End: 2023-12-11

## 2023-12-11 RX ORDER — FAMOTIDINE 10 MG/ML
20 INJECTION INTRAVENOUS DAILY
Refills: 0 | Status: DISCONTINUED | OUTPATIENT
Start: 2023-12-11 | End: 2023-12-16

## 2023-12-11 RX ORDER — INSULIN LISPRO 100/ML
10 VIAL (ML) SUBCUTANEOUS
Refills: 0 | Status: DISCONTINUED | OUTPATIENT
Start: 2023-12-11 | End: 2023-12-16

## 2023-12-11 RX ORDER — INSULIN LISPRO 100/ML
VIAL (ML) SUBCUTANEOUS
Refills: 0 | Status: DISCONTINUED | OUTPATIENT
Start: 2023-12-11 | End: 2023-12-16

## 2023-12-11 RX ORDER — CARVEDILOL PHOSPHATE 80 MG/1
3.12 CAPSULE, EXTENDED RELEASE ORAL EVERY 12 HOURS
Refills: 0 | Status: DISCONTINUED | OUTPATIENT
Start: 2023-12-11 | End: 2023-12-16

## 2023-12-11 RX ORDER — LANOLIN ALCOHOL/MO/W.PET/CERES
5 CREAM (GRAM) TOPICAL AT BEDTIME
Refills: 0 | Status: DISCONTINUED | OUTPATIENT
Start: 2023-12-11 | End: 2023-12-11

## 2023-12-11 RX ORDER — ATORVASTATIN CALCIUM 80 MG/1
40 TABLET, FILM COATED ORAL AT BEDTIME
Refills: 0 | Status: DISCONTINUED | OUTPATIENT
Start: 2023-12-11 | End: 2023-12-16

## 2023-12-11 RX ORDER — INSULIN GLARGINE 100 [IU]/ML
10 INJECTION, SOLUTION SUBCUTANEOUS AT BEDTIME
Refills: 0 | Status: DISCONTINUED | OUTPATIENT
Start: 2023-12-11 | End: 2023-12-16

## 2023-12-11 RX ORDER — ONDANSETRON 8 MG/1
4 TABLET, FILM COATED ORAL ONCE
Refills: 0 | Status: DISCONTINUED | OUTPATIENT
Start: 2023-12-11 | End: 2023-12-11

## 2023-12-11 RX ORDER — OXYCODONE AND ACETAMINOPHEN 5; 325 MG/1; MG/1
1 TABLET ORAL EVERY 4 HOURS
Refills: 0 | Status: DISCONTINUED | OUTPATIENT
Start: 2023-12-11 | End: 2023-12-16

## 2023-12-11 RX ORDER — ACETAMINOPHEN 500 MG
650 TABLET ORAL EVERY 6 HOURS
Refills: 0 | Status: DISCONTINUED | OUTPATIENT
Start: 2023-12-11 | End: 2023-12-16

## 2023-12-11 RX ORDER — INSULIN GLARGINE 100 [IU]/ML
10 INJECTION, SOLUTION SUBCUTANEOUS AT BEDTIME
Refills: 0 | Status: DISCONTINUED | OUTPATIENT
Start: 2023-12-11 | End: 2023-12-11

## 2023-12-11 RX ORDER — LANOLIN ALCOHOL/MO/W.PET/CERES
5 CREAM (GRAM) TOPICAL ONCE
Refills: 0 | Status: COMPLETED | OUTPATIENT
Start: 2023-12-11 | End: 2023-12-11

## 2023-12-11 RX ADMIN — Medication 10 UNIT(S): at 18:14

## 2023-12-11 RX ADMIN — Medication 5 MILLIGRAM(S): at 01:59

## 2023-12-11 RX ADMIN — IMIPENEM AND CILASTATIN 250 MILLIGRAM(S): 250; 250 INJECTION, POWDER, FOR SOLUTION INTRAVENOUS at 05:16

## 2023-12-11 RX ADMIN — Medication 1 TABLET(S): at 11:49

## 2023-12-11 RX ADMIN — Medication 63.75 MILLIMOLE(S): at 08:40

## 2023-12-11 RX ADMIN — CARVEDILOL PHOSPHATE 3.12 MILLIGRAM(S): 80 CAPSULE, EXTENDED RELEASE ORAL at 05:16

## 2023-12-11 RX ADMIN — FAMOTIDINE 20 MILLIGRAM(S): 10 INJECTION INTRAVENOUS at 11:49

## 2023-12-11 RX ADMIN — Medication 2: at 12:47

## 2023-12-11 RX ADMIN — APIXABAN 5 MILLIGRAM(S): 2.5 TABLET, FILM COATED ORAL at 09:02

## 2023-12-11 RX ADMIN — Medication 81 MILLIGRAM(S): at 11:49

## 2023-12-11 RX ADMIN — CARVEDILOL PHOSPHATE 3.12 MILLIGRAM(S): 80 CAPSULE, EXTENDED RELEASE ORAL at 17:45

## 2023-12-11 RX ADMIN — INSULIN GLARGINE 10 UNIT(S): 100 INJECTION, SOLUTION SUBCUTANEOUS at 21:51

## 2023-12-11 RX ADMIN — ATORVASTATIN CALCIUM 40 MILLIGRAM(S): 80 TABLET, FILM COATED ORAL at 21:50

## 2023-12-11 RX ADMIN — Medication 10 MILLIGRAM(S): at 05:16

## 2023-12-11 RX ADMIN — IMIPENEM AND CILASTATIN 250 MILLIGRAM(S): 250; 250 INJECTION, POWDER, FOR SOLUTION INTRAVENOUS at 13:04

## 2023-12-11 NOTE — DISCHARGE NOTE PROVIDER - CARE PROVIDER_API CALL
Terry Simons Demetri  Surgery  [address] [place] [id number]  Phone: [phone]  Fax: [phone]  Follow Up Time:    Terry Simons Demetri  Surgery  [address] [place] [id number]  Phone: [phone]  Fax: [phone]  Follow Up Time:

## 2023-12-11 NOTE — BRIEF OPERATIVE NOTE - OPERATION/FINDINGS
s/p R foot Partial Third Ray Resection, closed, L foot wound debridement with graft application  - Bone at proximal resection was hard and of good quality  - No purulence noted  - Adequate intraop bleeding  - R foot incision site was primarily closed with 3-0 nylon  - Left foot graft was applied using 3-0 Nylon

## 2023-12-11 NOTE — PROGRESS NOTE ADULT - PROBLEM SELECTOR PLAN 1
VARSHA Malone MD have participated in the daily care of this patient  and have seen and examined the patient today and agree with  the  evaluation, assessment and plan of the surgical house officer  VARSHA Malone MD have personally seen and examined the patient at bedside today at  2  pm

## 2023-12-11 NOTE — PROGRESS NOTE ADULT - SUBJECTIVE AND OBJECTIVE BOX
Podiatry Pager #: [phone]    Patient is a 53y old  Male who presents with a chief complaint of c/f osteomyelitis (10 Dec 2023 15:55)      INTERVAL HPI/OVERNIGHT EVENTS:   Pt is scheduled for Right foot partial 3rd ray resection and left foot wound debridement with Dr. Myers at 3:00pm. Patient is aware of procedure and is NPO since midnight.    MEDICATIONS  (STANDING):  apixaban 5 milliGRAM(s) Oral every 12 hours  aspirin enteric coated 81 milliGRAM(s) Oral daily  atorvastatin 40 milliGRAM(s) Oral at bedtime  carvedilol 3.125 milliGRAM(s) Oral every 12 hours  famotidine    Tablet 20 milliGRAM(s) Oral daily  imipenem/cilastatin  IVPB 1000 milliGRAM(s) IV Intermittent every 8 hours  insulin glargine Injectable (LANTUS) 10 Unit(s) SubCutaneous at bedtime  insulin lispro (ADMELOG) corrective regimen sliding scale   SubCutaneous at bedtime  insulin lispro (ADMELOG) corrective regimen sliding scale   SubCutaneous three times a day before meals  insulin lispro Injectable (ADMELOG) 10 Unit(s) SubCutaneous three times a day with meals  predniSONE   Tablet 10 milliGRAM(s) Oral daily  trimethoprim   80 mG/sulfamethoxazole 400 mG 1 Tablet(s) Oral daily    MEDICATIONS  (PRN):      Allergies    Contrast. (Unknown)    Intolerances        Vital Signs Last 24 Hrs  T(C): 36.8 (11 Dec 2023 05:18), Max: 36.9 (10 Dec 2023 17:00)  T(F): 98.2 (11 Dec 2023 05:18), Max: 98.5 (10 Dec 2023 17:00)  HR: 77 (11 Dec 2023 05:18) (74 - 81)  BP: 144/73 (11 Dec 2023 05:18) (144/73 - 171/81)  BP(mean): --  RR: 18 (11 Dec 2023 05:18) (18 - 18)  SpO2: 98% (11 Dec 2023 05:18) (98% - 99%)    Parameters below as of 11 Dec 2023 05:18  Patient On (Oxygen Delivery Method): room air        LABS:                        11.0   2.85  )-----------( 207      ( 11 Dec 2023 06:24 )             34.7     12-10    138  |  108  |  23  ----------------------------<  152<H>  4.8   |  22  |  1.32<H>    Ca    10.3      10 Dec 2023 06:30  Phos  2.2     12-10  Mg     1.9     12-10      PT/INR - ( 11 Dec 2023 06:24 )   PT: 13.4 sec;   INR: 1.29 ratio         PTT - ( 11 Dec 2023 06:24 )  PTT:37.1 sec  Urinalysis Basic - ( 10 Dec 2023 06:30 )    Color: x / Appearance: x / SG: x / pH: x  Gluc: 152 mg/dL / Ketone: x  / Bili: x / Urobili: x   Blood: x / Protein: x / Nitrite: x   Leuk Esterase: x / RBC: x / WBC x   Sq Epi: x / Non Sq Epi: x / Bacteria: x      CAPILLARY BLOOD GLUCOSE      POCT Blood Glucose.: 135 mg/dL (10 Dec 2023 21:15)  POCT Blood Glucose.: 99 mg/dL (10 Dec 2023 17:18)  POCT Blood Glucose.: 126 mg/dL (10 Dec 2023 12:32)  POCT Blood Glucose.: 155 mg/dL (10 Dec 2023 08:27)      RADIOLOGY & ADDITIONAL TESTS:    Plan:   To OR today at 3:00pm with Dr. Myers for Right foot partial 3rd ray resection and left foot wound debridement.   CXR on sunrise.  EKG on sunrise.  Medical/Cardiac clearance since 12/10 and documented in chart.  Consent signed and in chart.  Procedure was explained to patient in detail. All alternatives, risks and complications were discussed. All questions answered. Podiatry Pager #: [phone]    Patient is a 53y old  Male who presents with a chief complaint of c/f osteomyelitis (10 Dec 2023 15:55)      INTERVAL HPI/OVERNIGHT EVENTS:   Pt is scheduled for Right foot partial 3rd ray resection and left foot wound debridement with Dr. Myers at 3:00pm. Patient is aware of procedure and is NPO since midnight.    MEDICATIONS  (STANDING):  apixaban 5 milliGRAM(s) Oral every 12 hours  aspirin enteric coated 81 milliGRAM(s) Oral daily  atorvastatin 40 milliGRAM(s) Oral at bedtime  carvedilol 3.125 milliGRAM(s) Oral every 12 hours  famotidine    Tablet 20 milliGRAM(s) Oral daily  imipenem/cilastatin  IVPB 1000 milliGRAM(s) IV Intermittent every 8 hours  insulin glargine Injectable (LANTUS) 10 Unit(s) SubCutaneous at bedtime  insulin lispro (ADMELOG) corrective regimen sliding scale   SubCutaneous at bedtime  insulin lispro (ADMELOG) corrective regimen sliding scale   SubCutaneous three times a day before meals  insulin lispro Injectable (ADMELOG) 10 Unit(s) SubCutaneous three times a day with meals  predniSONE   Tablet 10 milliGRAM(s) Oral daily  trimethoprim   80 mG/sulfamethoxazole 400 mG 1 Tablet(s) Oral daily    MEDICATIONS  (PRN):      Allergies    Contrast. (Unknown)    Intolerances        Vital Signs Last 24 Hrs  T(C): 36.8 (11 Dec 2023 05:18), Max: 36.9 (10 Dec 2023 17:00)  T(F): 98.2 (11 Dec 2023 05:18), Max: 98.5 (10 Dec 2023 17:00)  HR: 77 (11 Dec 2023 05:18) (74 - 81)  BP: 144/73 (11 Dec 2023 05:18) (144/73 - 171/81)  BP(mean): --  RR: 18 (11 Dec 2023 05:18) (18 - 18)  SpO2: 98% (11 Dec 2023 05:18) (98% - 99%)    Parameters below as of 11 Dec 2023 05:18  Patient On (Oxygen Delivery Method): room air        LABS:                        11.0   2.85  )-----------( 207      ( 11 Dec 2023 06:24 )             34.7     12-10    138  |  108  |  23  ----------------------------<  152<H>  4.8   |  22  |  1.32<H>    Ca    10.3      10 Dec 2023 06:30  Phos  2.2     12-10  Mg     1.9     12-10      PT/INR - ( 11 Dec 2023 06:24 )   PT: 13.4 sec;   INR: 1.29 ratio         PTT - ( 11 Dec 2023 06:24 )  PTT:37.1 sec  Urinalysis Basic - ( 10 Dec 2023 06:30 )    Color: x / Appearance: x / SG: x / pH: x  Gluc: 152 mg/dL / Ketone: x  / Bili: x / Urobili: x   Blood: x / Protein: x / Nitrite: x   Leuk Esterase: x / RBC: x / WBC x   Sq Epi: x / Non Sq Epi: x / Bacteria: x      CAPILLARY BLOOD GLUCOSE      POCT Blood Glucose.: 135 mg/dL (10 Dec 2023 21:15)  POCT Blood Glucose.: 99 mg/dL (10 Dec 2023 17:18)  POCT Blood Glucose.: 126 mg/dL (10 Dec 2023 12:32)  POCT Blood Glucose.: 155 mg/dL (10 Dec 2023 08:27)      RADIOLOGY & ADDITIONAL TESTS:    Plan:   To OR today at 3:00pm with Dr. Myers for Right foot partial 3rd ray resection and left foot wound debridement.   CXR on sunrise.  EKG on sunrise.  Medical/Cardiac clearance since 12/10 and documented in chart.  Consent signed and in chart.  Procedure was explained to patient in detail. All alternatives, risks and complications were discussed. All questions answered.

## 2023-12-11 NOTE — PROGRESS NOTE ADULT - SUBJECTIVE AND OBJECTIVE BOX
Vascular Surgery Progress Note  Patient is a 53y old  Male who presents with a chief complaint of c/f osteomyelitis (11 Dec 2023 06:45)      INTERVAL EVENTS: No acute events overnight.  SUBJECTIVE: Patient seen and examined at bedside with surgical team, patient without complaints. Denies fever, chills, CP, SOB nausea, vomiting, abdominal pain.      OBJECTIVE:    Vital Signs Last 24 Hrs  T(C): 36.8 (11 Dec 2023 05:18), Max: 36.9 (10 Dec 2023 17:00)  T(F): 98.2 (11 Dec 2023 05:18), Max: 98.5 (10 Dec 2023 17:00)  HR: 77 (11 Dec 2023 05:18) (74 - 81)  BP: 144/73 (11 Dec 2023 05:18) (144/73 - 171/81)  BP(mean): --  RR: 18 (11 Dec 2023 05:18) (18 - 18)  SpO2: 98% (11 Dec 2023 05:18) (98% - 99%)    Parameters below as of 11 Dec 2023 05:18  Patient On (Oxygen Delivery Method): room air    I&O's Detail    10 Dec 2023 07:01  -  11 Dec 2023 07:00  --------------------------------------------------------  IN:    Oral Fluid: 250 mL  Total IN: 250 mL    OUT:    Voided (mL): 3300 mL  Total OUT: 3300 mL    Total NET: -3050 mL      MEDICATIONS  (STANDING):  apixaban 5 milliGRAM(s) Oral every 12 hours  aspirin enteric coated 81 milliGRAM(s) Oral daily  atorvastatin 40 milliGRAM(s) Oral at bedtime  carvedilol 3.125 milliGRAM(s) Oral every 12 hours  famotidine    Tablet 20 milliGRAM(s) Oral daily  imipenem/cilastatin  IVPB 1000 milliGRAM(s) IV Intermittent every 8 hours  insulin glargine Injectable (LANTUS) 10 Unit(s) SubCutaneous at bedtime  insulin lispro (ADMELOG) corrective regimen sliding scale   SubCutaneous at bedtime  insulin lispro (ADMELOG) corrective regimen sliding scale   SubCutaneous three times a day before meals  insulin lispro Injectable (ADMELOG) 10 Unit(s) SubCutaneous three times a day with meals  predniSONE   Tablet 10 milliGRAM(s) Oral daily  sodium phosphate 15 milliMole(s)/250 mL IVPB 15 milliMole(s) IV Intermittent once  trimethoprim   80 mG/sulfamethoxazole 400 mG 1 Tablet(s) Oral daily    MEDICATIONS  (PRN):      PHYSICAL EXAM:    General Appearance: Appears well, NAD  Neck: Supple  Chest: Equal expansion bilaterally  CV: Pulse regular presently  Abdomen: Soft, nontense  Extremities: -LLE: 1st metatarsal ulcer, +DP/PT signals, warm, motor/ sensory intact   -RLE: 3rd toe wound with dry gangrene, +DP/ PT signals, warm, motor/ sensory intact     LABS:                        11.0   2.85  )-----------( 207      ( 11 Dec 2023 06:24 )             34.7     12-11    136  |  104  |  18  ----------------------------<  99  4.6   |  24  |  1.30    Ca    10.7<H>      11 Dec 2023 06:24  Phos  2.2     12-11  Mg     1.8     12-11      PT/INR - ( 11 Dec 2023 06:24 )   PT: 13.4 sec;   INR: 1.29 ratio         PTT - ( 11 Dec 2023 06:24 )  PTT:37.1 sec    Urinalysis Basic - ( 11 Dec 2023 06:24 )    Color: x / Appearance: x / SG: x / pH: x  Gluc: 99 mg/dL / Ketone: x  / Bili: x / Urobili: x   Blood: x / Protein: x / Nitrite: x   Leuk Esterase: x / RBC: x / WBC x   Sq Epi: x / Non Sq Epi: x / Bacteria: x      ABO Interpretation: A (12-11-23 @ 06:48)      IMAGING:     Vascular Surgery Progress Note  Patient is a 53y old  Male who presents with a chief complaint of c/f osteomyelitis (11 Dec 2023 06:45)      INTERVAL EVENTS: No acute events overnight.  SUBJECTIVE: Patient seen and examined at bedside with surgical team, patient without complaints. Denies fever, chills, CP, SOB nausea, vomiting, abdominal pain.      OBJECTIVE:    Vital Signs Last 24 Hrs  T(C): 36.8 (11 Dec 2023 05:18), Max: 36.9 (10 Dec 2023 17:00)  T(F): 98.2 (11 Dec 2023 05:18), Max: 98.5 (10 Dec 2023 17:00)  HR: 77 (11 Dec 2023 05:18) (74 - 81)  BP: 144/73 (11 Dec 2023 05:18) (144/73 - 171/81)  BP(mean): --  RR: 18 (11 Dec 2023 05:18) (18 - 18)  SpO2: 98% (11 Dec 2023 05:18) (98% - 99%)    Parameters below as of 11 Dec 2023 05:18  Patient On (Oxygen Delivery Method): room air    I&O's Detail    10 Dec 2023 07:01  -  11 Dec 2023 07:00  --------------------------------------------------------  IN:    Oral Fluid: 250 mL  Total IN: 250 mL    OUT:    Voided (mL): 3300 mL  Total OUT: 3300 mL    Total NET: -3050 mL      MEDICATIONS  (STANDING):  apixaban 5 milliGRAM(s) Oral every 12 hours  aspirin enteric coated 81 milliGRAM(s) Oral daily  atorvastatin 40 milliGRAM(s) Oral at bedtime  carvedilol 3.125 milliGRAM(s) Oral every 12 hours  famotidine    Tablet 20 milliGRAM(s) Oral daily  imipenem/cilastatin  IVPB 1000 milliGRAM(s) IV Intermittent every 8 hours  insulin glargine Injectable (LANTUS) 10 Unit(s) SubCutaneous at bedtime  insulin lispro (ADMELOG) corrective regimen sliding scale   SubCutaneous at bedtime  insulin lispro (ADMELOG) corrective regimen sliding scale   SubCutaneous three times a day before meals  insulin lispro Injectable (ADMELOG) 10 Unit(s) SubCutaneous three times a day with meals  predniSONE   Tablet 10 milliGRAM(s) Oral daily  sodium phosphate 15 milliMole(s)/250 mL IVPB 15 milliMole(s) IV Intermittent once  trimethoprim   80 mG/sulfamethoxazole 400 mG 1 Tablet(s) Oral daily    MEDICATIONS  (PRN):      PHYSICAL EXAM:    General Appearance: Appears well, NAD  Neck: Supple  Chest: Equal expansion bilaterally  CV: Pulse regular presently  Abdomen: Soft, nontense  Extremities: -LLE: 1st metatarsal ulcer, +DP/PT signals, warm, motor/ sensory intact   -RLE: 3rd toe wound with dry gangrene, +DP/ PT signals, warm, motor/ sensory intact   left foot dorsum wound stable      LABS:                        11.0   2.85  )-----------( 207      ( 11 Dec 2023 06:24 )             34.7     12-11    136  |  104  |  18  ----------------------------<  99  4.6   |  24  |  1.30    Ca    10.7<H>      11 Dec 2023 06:24  Phos  2.2     12-11  Mg     1.8     12-11      PT/INR - ( 11 Dec 2023 06:24 )   PT: 13.4 sec;   INR: 1.29 ratio         PTT - ( 11 Dec 2023 06:24 )  PTT:37.1 sec    Urinalysis Basic - ( 11 Dec 2023 06:24 )    Color: x / Appearance: x / SG: x / pH: x  Gluc: 99 mg/dL / Ketone: x  / Bili: x / Urobili: x   Blood: x / Protein: x / Nitrite: x   Leuk Esterase: x / RBC: x / WBC x   Sq Epi: x / Non Sq Epi: x / Bacteria: x      ABO Interpretation: A (12-11-23 @ 06:48)

## 2023-12-11 NOTE — BRIEF OPERATIVE NOTE - NSICDXBRIEFPROCEDURE_GEN_ALL_CORE_FT
PROCEDURES:  Partial amputation of third ray of right foot by open approach 11-Dec-2023 18:28:17  Wilma Ding  Debridement of wound of foot with application of skin graft 11-Dec-2023 18:29:15  Wilma Ding

## 2023-12-11 NOTE — PROGRESS NOTE ADULT - ASSESSMENT
53M with PMH of HTN, HLD, DM2, CAD s/p CABG, HF s/p AICD, ESRD on HD since 2016 s/p DDRT (7/14/23) complicated by DGF, and toe amputations due to PVD/osteo, now presenting to the ED with concern for toe infection. Nephrology service consulted for management of immunosuppression of transplanted kidney.       1. 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   - Scr WNL at 1.3 today    2. Immunosuppression   - Initially received Simulect induction but changed to Thymoglobulin given delayed graft function.  - On MMF, Envarsus, and Prednisone 10mg (+DSA on 7/15/23) at home  - Recommend holding cellcept at this time due to osteo  - Tacro level elevated at 12 today (last dose on 12/10 Envarsus 2mg)  - Check serum tacrolimus level (trough) 30 mins prior to AM dose.  - Ppx with Valcyte (currently not being given) and Bactrim    3. ?Osteomyelitis - managed by Podiatry, Vascular, and Transplant ID. Pt is scheduled for Right foot partial 3rd ray resection and left foot wound debridement today.  4. HTN - acceptable on Coreg 3.125mg BID  5. DM2- On lantus and premeal insulin      If you have any questions, please feel free to contact me.  Stuart Franklin MD  Nephrology Fellow  [id number] / [employer] Teams (Preferred)  (After 4pm or on weekends, please call the on-call Fellow) 53M with PMH of HTN, HLD, DM2, CAD s/p CABG, HF s/p AICD, ESRD on HD since 2016 s/p DDRT (7/14/23) complicated by DGF, and toe amputations due to PVD/osteo, now presenting to the ED with concern for toe infection. Nephrology service consulted for management of immunosuppression of transplanted kidney.       1. 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   - Scr WNL at 1.3 today    2. Immunosuppression   - Initially received Simulect induction but changed to Thymoglobulin given delayed graft function.  - On MMF, Envarsus, and Prednisone 10mg (+DSA on 7/15/23) at home  - Recommend holding cellcept at this time due to osteo  - Tacro level elevated at 12 today (last dose on 12/10 Envarsus 2mg)  - Check serum tacrolimus level (trough) 30 mins prior to AM dose.  - Ppx with Valcyte (currently not being given) and Bactrim    3. ?Osteomyelitis - managed by Podiatry, Vascular, and Transplant ID. Pt is scheduled for Right foot partial 3rd ray resection and left foot wound debridement today.  4. HTN - acceptable on Coreg 3.125mg BID  5. DM2- On lantus and premeal insulin      If you have any questions, please feel free to contact me.  Stuart Franklin MD  Nephrology Fellow  [id number] / [employer] Teams (Preferred)  (After 4pm or on weekends, please call the on-call Fellow)

## 2023-12-11 NOTE — BRIEF OPERATIVE NOTE - NSICDXBRIEFPOSTOP_GEN_ALL_CORE_FT
How Severe Is Your Skin Lesion?: mild Has Your Skin Lesion Been Treated?: not been treated Is This A New Presentation, Or A Follow-Up?: Growth POST-OP DIAGNOSIS:  Osteomyelitis of right foot 11-Dec-2023 18:30:00  Wilma Ding

## 2023-12-11 NOTE — PROGRESS NOTE ADULT - SUBJECTIVE AND OBJECTIVE BOX
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 (Unknown)    Intolerances    Standing Inpatient Medications  apixaban 5 milliGRAM(s) Oral every 12 hours  aspirin enteric coated 81 milliGRAM(s) Oral daily  atorvastatin 40 milliGRAM(s) Oral at bedtime  carvedilol 3.125 milliGRAM(s) Oral every 12 hours  famotidine    Tablet 20 milliGRAM(s) Oral daily  imipenem/cilastatin  IVPB 1000 milliGRAM(s) IV Intermittent every 8 hours  insulin glargine Injectable (LANTUS) 10 Unit(s) SubCutaneous at bedtime  insulin lispro (ADMELOG) corrective regimen sliding scale   SubCutaneous three times a day before meals  insulin lispro (ADMELOG) corrective regimen sliding scale   SubCutaneous at bedtime  insulin lispro Injectable (ADMELOG) 10 Unit(s) SubCutaneous three times a day with meals  predniSONE   Tablet 10 milliGRAM(s) Oral daily  trimethoprim   80 mG/sulfamethoxazole 400 mG 1 Tablet(s) Oral daily    PRN Inpatient Medications    REVIEW OF SYSTEMS  --------------------------------------------------------------------------------  Gen: No fevers/chills  Skin: No rashes  Head/Eyes/Ears/Mouth: No headache  Respiratory: No dyspnea, cough  CV: No chest pain  GI: No abdominal pain, diarrhea, constipation, nausea, vomiting  : No increased frequency, dysuria  MSK: No edema  Neuro: No dizziness/lightheadedness, weakness    All other systems were reviewed and are negative, except as noted.    VITALS/PHYSICAL EXAM  --------------------------------------------------------------------------------  T(C): 36.6 (12-11-23 @ 08:51), Max: 36.9 (12-10-23 @ 17:00)  HR: 77 (12-11-23 @ 08:51) (74 - 81)  BP: 117/58 (12-11-23 @ 08:51) (117/58 - 171/81)  RR: 18 (12-11-23 @ 08:51) (18 - 18)  SpO2: 98% (12-11-23 @ 08:51) (98% - 99%)  Wt(kg): --    12-10-23 @ 07:01  -  12-11-23 @ 07:00  --------------------------------------------------------  IN: 250 mL / OUT: 3300 mL / NET: -3050 mL    12-11-23 @ 07:01  -  12-11-23 @ 12:44  --------------------------------------------------------  IN: 0 mL / OUT: 300 mL / NET: -300 mL    Physical Exam:  Gen: NAD  HEENT: Anicteric  Pulm: CTA B/L  CV: RRR, no murmur appreciated  Abd: +BS, soft and non distended abdomen. No tenderness to palpation. Transplant non tender, no bruit  Extremities: No edema, toe amputations, dressing on B/L LE  Skin: warm, stasis dermatitis  Neuro: Awake and alert    LABS/STUDIES  --------------------------------------------------------------------------------              11.0   2.85  >-----------<  207      [12-11-23 @ 06:24]              34.7     136  |  104  |  18  ----------------------------<  99      [12-11-23 @ 06:24]  4.6   |  24  |  1.30        Ca     10.7     [12-11-23 @ 06:24]      Mg     1.8     [12-11-23 @ 06:24]      Phos  2.2     [12-11-23 @ 06:24]    PT/INR: PT 13.4 , INR 1.29       [12-11-23 @ 06:24]  PTT: 37.1       [12-11-23 @ 06:24]    Creatinine Trend:  SCr 1.30 [12-11 @ 06:24]  SCr 1.32 [12-10 @ 06:30]  SCr 1.21 [12-09 @ 05:45]  SCr 1.15 [12-08 @ 07:17]  SCr 1.07 [12-07 @ 06:59]    Tacrolimus (), Serum: 12.0 ng/mL (12-11 @ 06:24)  Tacrolimus (), Serum: 12.1 ng/mL (12-10 @ 06:30)  Tacrolimus (), Serum: 19.4 ng/mL (12-09 @ 05:46)  Tacrolimus (), Serum: 11.5 ng/mL (12-08 @ 07:23)    Urinalysis - [12-11-23 @ 06:24]      Color  / Appearance  / SG  / pH       Gluc 99 / Ketone   / Bili  / Urobili        Blood  / Protein  / Leuk Est  / Nitrite       RBC  / WBC  / Hyaline  / Gran  / Sq Epi  / Non Sq Epi  / Bacteria     Iron 68, TIBC 185, %sat 37      [08-16-23 @ 06:39]  Ferritin 1010      [08-16-23 @ 06:39]  PTH -- (Ca 10.6)      [11-02-23 @ 11:56]   220  Vitamin D (25OH) 18.0      [11-02-23 @ 11:56] [facility] DIVISION OF KIDNEY DISEASES AND HYPERTENSION -- FOLLOW UP NOTE  --------------------------------------------------------------------------------    24 hour events/subjective:  -No acute events overnight  -Pt with osteomyelitis of toes   -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 (Unknown)    Intolerances    Standing Inpatient Medications  apixaban 5 milliGRAM(s) Oral every 12 hours  aspirin enteric coated 81 milliGRAM(s) Oral daily  atorvastatin 40 milliGRAM(s) Oral at bedtime  carvedilol 3.125 milliGRAM(s) Oral every 12 hours  famotidine    Tablet 20 milliGRAM(s) Oral daily  imipenem/cilastatin  IVPB 1000 milliGRAM(s) IV Intermittent every 8 hours  insulin glargine Injectable (LANTUS) 10 Unit(s) SubCutaneous at bedtime  insulin lispro (ADMELOG) corrective regimen sliding scale   SubCutaneous three times a day before meals  insulin lispro (ADMELOG) corrective regimen sliding scale   SubCutaneous at bedtime  insulin lispro Injectable (ADMELOG) 10 Unit(s) SubCutaneous three times a day with meals  predniSONE   Tablet 10 milliGRAM(s) Oral daily  trimethoprim   80 mG/sulfamethoxazole 400 mG 1 Tablet(s) Oral daily    PRN Inpatient Medications    REVIEW OF SYSTEMS  --------------------------------------------------------------------------------  Gen: No fevers/chills  Skin: No rashes  Head/Eyes/Ears/Mouth: No headache  Respiratory: No dyspnea, cough  CV: No chest pain  GI: No abdominal pain, diarrhea, constipation, nausea, vomiting  : No increased frequency, dysuria  MSK: No edema  Neuro: No dizziness/lightheadedness, weakness    All other systems were reviewed and are negative, except as noted.    VITALS/PHYSICAL EXAM  --------------------------------------------------------------------------------  T(C): 36.6 (12-11-23 @ 08:51), Max: 36.9 (12-10-23 @ 17:00)  HR: 77 (12-11-23 @ 08:51) (74 - 81)  BP: 117/58 (12-11-23 @ 08:51) (117/58 - 171/81)  RR: 18 (12-11-23 @ 08:51) (18 - 18)  SpO2: 98% (12-11-23 @ 08:51) (98% - 99%)  Wt(kg): --    12-10-23 @ 07:01  -  12-11-23 @ 07:00  --------------------------------------------------------  IN: 250 mL / OUT: 3300 mL / NET: -3050 mL    12-11-23 @ 07:01  -  12-11-23 @ 12:44  --------------------------------------------------------  IN: 0 mL / OUT: 300 mL / NET: -300 mL    Physical Exam:  Gen: NAD  HEENT: Anicteric  Pulm: CTA B/L  CV: RRR, no murmur appreciated  Abd: +BS, soft and non distended abdomen. No tenderness to palpation. Transplant non tender, no bruit  Extremities: No edema, toe amputations, dressing on B/L LE  Skin: warm, stasis dermatitis  Neuro: Awake and alert    LABS/STUDIES  --------------------------------------------------------------------------------              11.0   2.85  >-----------<  207      [12-11-23 @ 06:24]              34.7     136  |  104  |  18  ----------------------------<  99      [12-11-23 @ 06:24]  4.6   |  24  |  1.30        Ca     10.7     [12-11-23 @ 06:24]      Mg     1.8     [12-11-23 @ 06:24]      Phos  2.2     [12-11-23 @ 06:24]    PT/INR: PT 13.4 , INR 1.29       [12-11-23 @ 06:24]  PTT: 37.1       [12-11-23 @ 06:24]    Creatinine Trend:  SCr 1.30 [12-11 @ 06:24]  SCr 1.32 [12-10 @ 06:30]  SCr 1.21 [12-09 @ 05:45]  SCr 1.15 [12-08 @ 07:17]  SCr 1.07 [12-07 @ 06:59]    Tacrolimus (), Serum: 12.0 ng/mL (12-11 @ 06:24)  Tacrolimus (), Serum: 12.1 ng/mL (12-10 @ 06:30)  Tacrolimus (), Serum: 19.4 ng/mL (12-09 @ 05:46)  Tacrolimus (), Serum: 11.5 ng/mL (12-08 @ 07:23)    Urinalysis - [12-11-23 @ 06:24]      Color  / Appearance  / SG  / pH       Gluc 99 / Ketone   / Bili  / Urobili        Blood  / Protein  / Leuk Est  / Nitrite       RBC  / WBC  / Hyaline  / Gran  / Sq Epi  / Non Sq Epi  / Bacteria     Iron 68, TIBC 185, %sat 37      [08-16-23 @ 06:39]  Ferritin 1010      [08-16-23 @ 06:39]  PTH -- (Ca 10.6)      [11-02-23 @ 11:56]   220  Vitamin D (25OH) 18.0      [11-02-23 @ 11:56]

## 2023-12-11 NOTE — PRE-ANESTHESIA EVALUATION ADULT - BP NONINVASIVE MEAN (MM HG)
HPI Comments: Sarah Javier is a [de-identified] y.o. female with PMhx significant for Alzheimer's Disease and dementia who presents ambulatory to the ED for an evaluation of gradually worsening confusion x 2 days. The pt's daughter also states that the pt was c/o left lower back pain s/p fall on 5/23/17. The daughter states that the pt resides at the [facility] and notes that she checks in on the pt on a daily basis. She reports that the pt fell on 5/23/17 and started c/o the left lower back pain after fall. She states that no one witnessed the fall, and both the daughter and the pt are unsure of the time that the pt was down for. She notes that she has been giving the pt Tramadol and Meloxicam for her pain, noting that the pt's last dose of Tramadol was last night (5/26/17) and her last dose of Meloxicam was this morning. The pt states that her pain has improved since her fall. Her daughter states that the pt is ambulatory without aid on baseline but reports that the pt has been using a walker to ambulate following the fall. The pt notes that she does not recall the fall. Per daughter, the pt has been presenting with worsening confusion x 2 days, noting that she has also been talking less since the onset of the confusion. She expresses concerns for the possibility of UTI, given that the pt has had frequent diagnoses of UTI since 9/2016. She reports that the pt's last UTI was 4/30/17. The pt specifically denies CP, N/V/D, fever, abdominal pain, or extremity pain at this time. SHx: -tobacco, -EtOH, -illicit drug use    PCP: Elise Bautista III, DO    There are no other complaints, changes or physical findings at this time. The history is provided by the patient and a relative. No  was used.         Past Medical History:   Diagnosis Date    Arthritis     hands    Hypertension     Incontinence of urine     Other ill-defined conditions     freq UTIs    Thromboembolus ([facility] [address] 5-2011 left leg    Unspecified adverse effect of anesthesia     PONV       Past Surgical History:   Procedure Laterality Date    BREAST SURGERY PROCEDURE UNLISTED      right breast bx x2    HX CATARACT REMOVAL  2008    zoe    HX CHOLECYSTECTOMY      HX UROLOGICAL  2006    bladder sling    VASCULAR SURGERY PROCEDURE UNLIST  6-2011    left leg vein procedure DVT         Family History:   Problem Relation Age of Onset    Cancer Father      lung, prostate    Heart Disease Brother     No Known Problems Mother        Social History     Social History    Marital status:      Spouse name: N/A    Number of children: N/A    Years of education: N/A     Occupational History    Not on file. Social History Main Topics    Smoking status: Never Smoker    Smokeless tobacco: Never Used    Alcohol use No    Drug use: No    Sexual activity: No     Other Topics Concern    Not on file     Social History Narrative         ALLERGIES: Ciprofloxacin; Ditropan [oxybutynin chloride]; Lisinopril; Sulfa (sulfonamide antibiotics); and Toprol xl [metoprolol succinate]    Review of Systems   Constitutional: Negative for chills, fatigue and fever. HENT: Negative for congestion, rhinorrhea and sore throat. Eyes: Negative for pain, discharge and visual disturbance. Respiratory: Negative for cough, chest tightness, shortness of breath and wheezing. Cardiovascular: Negative for chest pain, palpitations and leg swelling. Gastrointestinal: Negative for abdominal pain, constipation, diarrhea, nausea and vomiting. Genitourinary: Negative for dysuria, frequency and hematuria. Musculoskeletal: Positive for back pain (left lower). Negative for arthralgias and myalgias (extremities). Skin: Negative for rash. Neurological: Negative for dizziness, weakness, light-headedness and headaches. Psychiatric/Behavioral: Positive for confusion.        Patient Vitals for the past 12 hrs:   Temp Pulse Resp BP SpO2   05/27/17 1530 - - - 107/41 91 %   05/27/17 1410 98.1 °F (36.7 °C) 65 16 106/60 93 %            Physical Exam   Constitutional: She appears well-developed and well-nourished. No distress. HENT:   Head: Normocephalic and atraumatic. Eyes: EOM are normal. Right eye exhibits no discharge. Left eye exhibits no discharge. No scleral icterus. Contusion to left eye   Neck: Normal range of motion. Neck supple. No tracheal deviation present. Cardiovascular: Normal rate, regular rhythm, normal heart sounds and intact distal pulses. Exam reveals no gallop and no friction rub. No murmur heard. Pulmonary/Chest: Effort normal and breath sounds normal. No respiratory distress. She has no wheezes. She has no rales. Abdominal: Soft. She exhibits no distension. There is no tenderness. Musculoskeletal: Normal range of motion. She exhibits no edema. CTL spine normal alignment. No step off or midline tenderness. Good range of motion of extremities without tenderness. Lymphadenopathy:     She has no cervical adenopathy. Neurological: She is alert. No focal neuro deficits  Oriented to self and person. Facial symmetry. Moving all extremities equally. Skin: Skin is warm and dry. No rash noted. Psychiatric: She has a normal mood and affect. Nursing note and vitals reviewed. MDM  Number of Diagnoses or Management Options  NEIL (acute kidney injury) ([facility] [address]): Compression fracture of lumbar vertebra, closed, initial encounter:   Confusion:   Elevated troponin:   Diagnosis management comments:     Patient presents to ED after falling four days ago. Fall was unwitnessed, and patient does not recall fall due to history of AD. Differential includes head injury, ICH, contusion, fracture, dislocation, electrolyte abnormality, UTI. Overall low suspicion for ACS, arrhythmia.  - CBC, CMP, Troponin, UA    - Lumbar series, left hip  - HCT    Disposition: Leukocytosis of 24 - CXR negative, UA pending (hospitalist aware).   Troponin .15 although patient denies chest pain and dyspnea. Lumbar series concerning for compression fracture - suspect this is acute due to recent fall and pain with ambulation. NEIL also noted - patient has been eating/drinking so may be related to Meloxicam.  Plan to admit for further management. Amount and/or Complexity of Data Reviewed  Clinical lab tests: reviewed and ordered  Tests in the radiology section of CPT®: reviewed and ordered  Tests in the medicine section of CPT®: ordered and reviewed  Obtain history from someone other than the patient: yes (Daughter)  Review and summarize past medical records: yes  Discuss the patient with other providers: yes (Hospitalist)  Independent visualization of images, tracings, or specimens: yes    Patient Progress  Patient progress: stable    ED Course       Procedures    EKG interpretation: (Preliminary) 1538  Rhythm: normal sinus rhythm; and regular . Rate (approx.): 60; Axis: normal; RI interval: normal; QRS interval: poor QRS progression; ST/T wave: non-specific T wave abnormality. Written by Ren Stanley ED Scribe, as dictated by Rajesh Christiansen MD.    CONSULT NOTE:   4:39 PM  Rajesh Christiansen MD spoke with Dr. Keren Morrison,   Specialty: Hospitalist  Discussed pt's hx, disposition, and available diagnostic and imaging results. Reviewed care plans. Consultant will evaluate pt for admission.   Written by Ren Stanley ED Scribe, as dictated by Rajesh Christiansen MD.      LABORATORY TESTS:  Recent Results (from the past 12 hour(s))   CBC WITH AUTOMATED DIFF    Collection Time: 05/27/17  2:48 PM   Result Value Ref Range    WBC 24.3 (H) 3.6 - 11.0 K/uL    RBC 5.19 3.80 - 5.20 M/uL    HGB 15.5 11.5 - 16.0 g/dL    HCT 44.1 35.0 - 47.0 %    MCV 85.0 80.0 - 99.0 FL    MCH 29.9 26.0 - 34.0 PG    MCHC 35.1 30.0 - 36.5 g/dL    RDW 14.9 (H) 11.5 - 14.5 %    PLATELET 388 426 - 529 K/uL    NEUTROPHILS 84 (H) 32 - 75 %    BAND NEUTROPHILS 5 0 - 6 %    LYMPHOCYTES 2 (L) 12 - 49 %    MONOCYTES 7 5 - 13 %    EOSINOPHILS 1 0 - 7 %    BASOPHILS 1 0 - 1 %    ABS. NEUTROPHILS 21.7 (H) 1.8 - 8.0 K/UL    ABS. LYMPHOCYTES 0.5 (L) 0.8 - 3.5 K/UL    ABS. MONOCYTES 1.7 (H) 0.0 - 1.0 K/UL    ABS. EOSINOPHILS 0.2 0.0 - 0.4 K/UL    ABS. BASOPHILS 0.2 (H) 0.0 - 0.1 K/UL    RBC COMMENTS NORMOCYTIC, NORMOCHROMIC      WBC COMMENTS VACUOLATED POLYS      DF MANUAL     METABOLIC PANEL, COMPREHENSIVE    Collection Time: 05/27/17  2:48 PM   Result Value Ref Range    Sodium 138 136 - 145 mmol/L    Potassium 3.2 (L) 3.5 - 5.1 mmol/L    Chloride 103 97 - 108 mmol/L    CO2 25 21 - 32 mmol/L    Anion gap 10 5 - 15 mmol/L    Glucose 93 65 - 100 mg/dL    BUN 68 (H) 6 - 20 MG/DL    Creatinine 1.96 (H) 0.55 - 1.02 MG/DL    BUN/Creatinine ratio 35 (H) 12 - 20      GFR est AA 30 (L) >60 ml/min/1.73m2    GFR est non-AA 25 (L) >60 ml/min/1.73m2    Calcium 8.6 8.5 - 10.1 MG/DL    Bilirubin, total 0.7 0.2 - 1.0 MG/DL    ALT (SGPT) 31 12 - 78 U/L    AST (SGOT) 42 (H) 15 - 37 U/L    Alk. phosphatase 163 (H) 45 - 117 U/L    Protein, total 6.7 6.4 - 8.2 g/dL    Albumin 2.4 (L) 3.5 - 5.0 g/dL    Globulin 4.3 (H) 2.0 - 4.0 g/dL    A-G Ratio 0.6 (L) 1.1 - 2.2     TROPONIN I    Collection Time: 05/27/17  2:48 PM   Result Value Ref Range    Troponin-I, Qt. 0.15 (H) <0.05 ng/mL   EKG, 12 LEAD, INITIAL    Collection Time: 05/27/17  3:38 PM   Result Value Ref Range    Ventricular Rate 60 BPM    Atrial Rate 60 BPM    P-R Interval 140 ms    QRS Duration 82 ms    Q-T Interval 474 ms    QTC Calculation (Bezet) 474 ms    Calculated P Axis 14 degrees    Calculated R Axis 28 degrees    Calculated T Axis 63 degrees    Diagnosis       Sinus rhythm with premature atrial complexes  Low voltage QRS  Borderline ECG  When compared with ECG of 25-SEP-2016 18:58,  premature atrial complexes are now present         IMAGING RESULTS:  XR CHEST PORT   Final Result   PORTABLE CHEST RADIOGRAPH/S: 5/27/2017 4:11 PM     INDICATION: Leukocytosis.    HISTORY (per electronic medical record): Chronic UTI, dementia.     COMPARISON: 9/25/2016, 1/11/2007, 7/27/2006.     TECHNIQUE: Portable frontal semiupright radiograph/s of the chest.     FINDINGS:   The lungs are clear. There is probably emphysema. The central airways are  patent. No pneumothorax or pleural effusion.      IMPRESSION  IMPRESSION:   No acute disease in the chest.   XR HIP LT W OR WO PELV 2-3 VWS   Final Result   EXAM: XR HIP LT W OR WO PELV 2-3 VWS     INDICATION: fall with left hip pain.     COMPARISON: None.     FINDINGS: An AP view of the pelvis and a frogleg lateral view of the left hip  are underpenetrated due to portable technique. The patient is osteopenic,  further limiting sensitivity for nondisplaced fracture. No displaced fracture.     IMPRESSION  IMPRESSION: No displaced fracture. XR SPINE LUMB 2 OR 3 V   Final Result   LUMBAR SPINE, 3 VIEWS. 5/27/2017 3:23 PM     INDICATION: fall with low back pain     COMPARISON: 1/25/2007.     FINDINGS:  Frontal, lateral, and lumbosacral radiographs. Underpenetrated due to portable  technique. An L5 compression fracture with 50% vertebral body height loss is age  indeterminate, but new from 2007. Cholecystectomy clips in the right upper  quadrant. Hyperdensities in the left hemiabdomen and overlying the sacrum may  represent undigested pills or barium or calcium within diverticula. The left  hemiabdominal hyperdensity could also represent a renal or ureteral calculus.     IMPRESSION  IMPRESSION:  Osteoporosis and age-indeterminate L5 compression fracture. Abdominal  hyperdensities as described above; favor pills. CT HEAD WO CONT   Final Result   EXAM: CT HEAD WO CONT     INDICATION: fall with head injury     COMPARISON: 2015.     TECHNIQUE: Unenhanced CT of the head was performed using 5 mm images. Brain and  bone windows were generated.  CT dose reduction was achieved through use of a  standardized protocol tailored for this examination and automatic exposure  control for dose modulation.      FINDINGS:  There is slight prominence of the ventricles and sulci. There are slight changes  small vessel disease periventricular white matter. No hemorrhage mass or acute  infarction is identified. Bony structures are unchanged.     IMPRESSION  IMPRESSION: No acute abnormality identified. MEDICATIONS GIVEN:  Medications   0.9% sodium chloride infusion (not administered)       IMPRESSION:  1. NEIL (acute kidney injury) ([address]    2. Confusion    3. Elevated troponin    4. Compression fracture of lumbar vertebra, closed, initial encounter        PLAN:  1. Admit to Hospitalist.    ADMIT NOTE:  4:45 PM  Patient is being admitted to the hospital by Dr. Frances Mandel. The results of their tests and reasons for their admission have been discussed with them and/or available family. They convey agreement and understanding for the need to be admitted and for their admission diagnosis. Consultation has been made with the inpatient physician specialist for hospitalization. This note is prepared by Rosana Sandy, acting as Scribe for Garrett Huerta MD.    Garrett Huerta MD: The scribe's documentation has been prepared under my direction and personally reviewed by me in its entirety. I confirm that the note above accurately reflects all work, treatment, procedures, and medical decision making performed by me. 78

## 2023-12-11 NOTE — DISCHARGE NOTE PROVIDER - NSDCCPCAREPLAN_GEN_ALL_CORE_FT
PRINCIPAL DISCHARGE DIAGNOSIS  Diagnosis: Osteomyelitis of foot  Assessment and Plan of Treatment:       SECONDARY DISCHARGE DIAGNOSES  Diagnosis: Status post debridement  Assessment and Plan of Treatment:      PRINCIPAL DISCHARGE DIAGNOSIS  Diagnosis: Osteomyelitis of foot  Assessment and Plan of Treatment: He underwent Right foot partial 3rd ray resection and left foot wound debridement on 12/11.  After discussion b/n podiatry and vascular angiogram deferred at this time as there is adequate flow and good healing.  Continue imipenem for total of 4 weeks. Follow up with podiatry.      SECONDARY DISCHARGE DIAGNOSES  Diagnosis: Status post debridement  Assessment and Plan of Treatment: He underwent Right foot partial 3rd ray resection and left foot wound debridement on 12/11.  After discussion b/n podiatry and vascular angiogram deferred at this time as there is adequate flow and good healing.  Continue imipenem for total of 4 weeks. Follow up with podiatry.

## 2023-12-11 NOTE — DISCHARGE NOTE PROVIDER - HOSPITAL COURSE
53M with ESRD on HD for 6 years. S/p DDRT on 7/14/23, initial DGF needing HD until 8/7/23. H/o ABMR 8/2023 with ~ 6000 class II DSA treated with steroids, plex/ivig and ritux.   Recovered graft function to baseline creatinine of 1.3.  PMH: DMII on insulin, CAD, CHF, s/p CABG 2015, AICD (2016), PVD, is s/p 4 stents in R leg , right big toe and second toe amputation 2021.   He is admitted with osteomyelitis. He is on IV imipenem/cilastatin  He underwent Right foot partial 3rd ray resection and left foot wound debridement on 12/11.  After discussion b/n podiatry and vascular angiogram deferred at this time as there is adequate flow and good healing.     - S/p DDRT 7/2023 with early ABMR treated, creatinine down o 1.37 today stable.   - Immunosuppression s/p thymo induction, rx for ABMR with plex/ivig/ritux 8/2023    Envarsus was on hold for elevated tac level of, resumed at 1mg, level 3.2 today, increase to 2mg.      MMF on hold for infection, continue prednisone 10mg daily.     Continue bactrim for PCP prophylaxis. CMV PCR negative on 12/7. Valcyte d/siena   - Osteomyelitis - continue imipenem/cilastatin. Needs PICC line for long term antibiotics. He is cleared by us for PICC placement.   - DM II continue Lantus and Lispro   - HTN - continue low dose coreg 3.125mg po q12h   - CAD/PAD- continue ASA 81, atorvastatin 40. After discussion b/n podiatry and vascular angiogram deferred at this time as there is adequate flow and good healing.  - D/c planning once home IV set up . 53M with PMH of DMII on insulin, CAD, CHF, s/p CABG 2015, AICD (2016), PVD, is s/p 4 stents in R leg , right big toe and second toe amputation 2021, ESRD on HD for 6 years. S/p DDRT on 7/14/23, initial DGF needing HD until 8/7/23. H/o ABMR 8/2023 with ~ 6000 class II DSA treated with steroids, plex/ivig and ritux. Recovered graft function to baseline creatinine of 1.3.  He is admitted with osteomyelitis, on IV imipenem/cilastatin.  He underwent Right foot partial 3rd ray resection and left foot wound debridement on 12/11.  After discussion b/n podiatry and vascular angiogram deferred at this time as there is adequate flow and good healing.   PICC line placed 12/14    Creatinine down to 1.11 today stable.   Envarsus 2mg, prednisone 10mg daily, restarted MMF 500BID.    Patient is deemed stable for discharge with follow up in the clinic in one week. 53M with PMH of DMII on insulin, CAD, CHF, s/p CABG 2015, AICD (2016), PVD, is s/p 4 stents in R leg , right big toe and second toe amputation 2021, ESRD on HD for 6 years. S/p DDRT on 7/14/23, initial DGF needing HD until 8/7/23. H/o ABMR 8/2023 with ~ 6000 class II DSA treated with steroids, plex/ivig and ritux. Recovered graft function to baseline creatinine of 1.3.  He is admitted with osteomyelitis, on IV imipenem/cilastatin.  He underwent Right foot partial 3rd ray resection and left foot wound debridement on 12/11.  After discussion b/n podiatry and vascular angiogram deferred at this time as there is adequate flow and good healing.   PICC line placed 12/14 for continuation of abx on discharge.    Creatinine down to 1.11 today stable.   Envarsus 2mg, prednisone 10mg daily, restarted MMF 500BID.    Patient is deemed stable for discharge with antibiotic imipenem 12/10 for total of 4 weeks.  Follow up with podiatry and transplant clinic in one week.

## 2023-12-11 NOTE — DISCHARGE NOTE PROVIDER - CARE PROVIDERS DIRECT ADDRESSES
[email or web address]

## 2023-12-11 NOTE — PROGRESS NOTE ADULT - ASSESSMENT
Patient visited at bedside INAD awaiting surgery both feet  LOWER EXTREMITY PHYSICAL EXAM:    Vascular: DP/PT 0/4, B/L, CFT <delayed B/L, Temperature gradient wnl, B/L.   Neuro: Epicritic sensation absent to the level of feet, B/L.  Musculoskeletal/Ortho: Osteomeylitis right foot with exposed bone right 3rd toe  Ulcer left foot with possible osteomyelitis left foot  Severe PVD both feet  I spoke with vascular Dr. Malone in depth multiple times and patient was cleared for surgery from vascular standpoint  Dr. Malone relates that right foot has a much better chance to heal than left foot  PVD left foot is more advanced than right foot  Dr. Wright relates that patient is extremely high risk for BKA both legs, left leg > right leg due to PVD  left foot seems to be responding to antibiotic treatment with decreased edema, erythma and no signs of ascending cellulitis left foot  Right foot exposed bone with no signs of ascending cellulitis  patient is requesting amputation of right 3rd toe with resection of osteomyelitis right foot  Patient does not want 1 ray resection left foot due to risk of non-healing and is requesting minimal debridement of wound left foot with application of graft to promote wound healing  patient may need 1 ray resection in future but would need revasc prior to more aggressive left foot surgery  Patient is following up with Dr. Malone regarding vascular options  patient understands that he could end up having problems with his kidneys due to infected feet and has risk of kidney failure as well as BKA both legs  patient told that he may need prolonged antibiosis which could negatively effect his kidneys  patient is requesting surgery right foot to resect bone and only debridement of wound with application of graft left foot today  podiatry to follow

## 2023-12-11 NOTE — PROGRESS NOTE ADULT - SUBJECTIVE AND OBJECTIVE BOX
Subjective: Patient seen and examined. No new events except as noted.     SUBJECTIVE/ROS:  No chest pain, dyspnea, palpitation, or dizziness.       MEDICATIONS:  MEDICATIONS  (STANDING):  apixaban 5 milliGRAM(s) Oral every 12 hours  aspirin enteric coated 81 milliGRAM(s) Oral daily  atorvastatin 40 milliGRAM(s) Oral at bedtime  carvedilol 3.125 milliGRAM(s) Oral every 12 hours  famotidine    Tablet 20 milliGRAM(s) Oral daily  imipenem/cilastatin  IVPB 1000 milliGRAM(s) IV Intermittent every 8 hours  insulin glargine Injectable (LANTUS) 10 Unit(s) SubCutaneous at bedtime  insulin lispro (ADMELOG) corrective regimen sliding scale   SubCutaneous three times a day before meals  insulin lispro (ADMELOG) corrective regimen sliding scale   SubCutaneous at bedtime  insulin lispro Injectable (ADMELOG) 10 Unit(s) SubCutaneous three times a day with meals  predniSONE   Tablet 10 milliGRAM(s) Oral daily  trimethoprim   80 mG/sulfamethoxazole 400 mG 1 Tablet(s) Oral daily      PHYSICAL EXAM:  T(C): 36.6 (12-11-23 @ 08:51), Max: 36.9 (12-10-23 @ 17:00)  HR: 77 (12-11-23 @ 08:51) (74 - 81)  BP: 117/58 (12-11-23 @ 08:51) (117/58 - 171/81)  RR: 18 (12-11-23 @ 08:51) (18 - 18)  SpO2: 98% (12-11-23 @ 08:51) (98% - 99%)  Wt(kg): --  I&O's Summary    10 Dec 2023 07:01  -  11 Dec 2023 07:00  --------------------------------------------------------  IN: 250 mL / OUT: 3300 mL / NET: -3050 mL            JVP: Normal  Neck: supple  Lung: clear   CV: S1 S2 , Murmur:  Abd: soft  Ext: No edema  neuro: Awake / alert  Psych: flat affect      LABS/DATA:    CARDIAC MARKERS:                                11.0   2.85  )-----------( 207      ( 11 Dec 2023 06:24 )             34.7     12-11    136  |  104  |  18  ----------------------------<  99  4.6   |  24  |  1.30    Ca    10.7<H>      11 Dec 2023 06:24  Phos  2.2     12-11  Mg     1.8     12-11      proBNP:   Lipid Profile:   HgA1c:   TSH:     TELE:  EKG:

## 2023-12-11 NOTE — PROGRESS NOTE ADULT - SUBJECTIVE AND OBJECTIVE BOX
Patient is a 53y old  Male who presents with a chief complaint of c/f osteomyelitis (11 Dec 2023 12:43)      HPI:  Tee Salvador is a 52 y/o M with past medical history significant for IDDM, CAD s/p CABG x4v (2015) s/p AICD (2016), PVD (4 stents in RLE), HTN, HLD and ESRD previously on HD via LUE AVF (ligated 9/8/23) now s/p DDRT 7/16/2023 with Simulect -> Thymoglobulin induction. His post-transplant course was complicated by DGF and subtly AMR with +DSAs s/p PLEX/IVIG (8/2023) and L subclavian thrombus remains on Eliquis presents to [facility] ED from wound care clinic on 12/6/23 after concern for osteomyelitis from clinic providers.     Per patient, he has been following with wound clinic. Reports he had a blister on his L anterior foot that had popped and scabbed over. Also reports hearing a "pop" with an inability to walk on his L leg, and attributes this with why he is now ambulating with a cane. He also reports "the doc said the Right foot is worse" reporting during exam today they were able to "touch the bone". He denies foul smelling foot, odors, discharge from wounds. Denies fevers, chills. Reports no changes in blood sugars at home, other than "the up and down" with controlled glucose in AM then elevated to 200s sometimes during the day. Denies dysuria, abdominal pain.  (06 Dec 2023 16:33)      PAST MEDICAL & SURGICAL HISTORY:  Diabetes mellitus  type 2      Foot ulcer due to secondary DM      Coronary artery disease      Acute on chronic systolic heart failure      H/O kidney transplant      Breast Reduction  at age 18      Toe amputation status, left      S/P CABG (coronary artery bypass graft)      AICD (automatic cardioverter/defibrillator) present          MEDICATIONS  (STANDING):  apixaban 5 milliGRAM(s) Oral every 12 hours  aspirin enteric coated 81 milliGRAM(s) Oral daily  atorvastatin 40 milliGRAM(s) Oral at bedtime  carvedilol 3.125 milliGRAM(s) Oral every 12 hours  famotidine    Tablet 20 milliGRAM(s) Oral daily  imipenem/cilastatin  IVPB 1000 milliGRAM(s) IV Intermittent every 8 hours  insulin glargine Injectable (LANTUS) 10 Unit(s) SubCutaneous at bedtime  insulin lispro (ADMELOG) corrective regimen sliding scale   SubCutaneous at bedtime  insulin lispro (ADMELOG) corrective regimen sliding scale   SubCutaneous three times a day before meals  insulin lispro Injectable (ADMELOG) 10 Unit(s) SubCutaneous three times a day with meals  predniSONE   Tablet 10 milliGRAM(s) Oral daily  trimethoprim   80 mG/sulfamethoxazole 400 mG 1 Tablet(s) Oral daily    MEDICATIONS  (PRN):      Allergies    Contrast. (Unknown)    Intolerances        VITALS:    Vital Signs Last 24 Hrs  T(C): 36.5 (11 Dec 2023 13:32), Max: 36.9 (10 Dec 2023 17:00)  T(F): 97.7 (11 Dec 2023 13:21), Max: 98.5 (10 Dec 2023 17:00)  HR: 71 (11 Dec 2023 13:32) (71 - 81)  BP: 128/54 (11 Dec 2023 13:32) (117/58 - 171/81)  BP(mean): 78 (11 Dec 2023 13:32) (78 - 78)  RR: 17 (11 Dec 2023 13:32) (17 - 18)  SpO2: 100% (11 Dec 2023 13:32) (98% - 100%)    Parameters below as of 11 Dec 2023 13:21  Patient On (Oxygen Delivery Method): room air        LABS:                          11.0   2.85  )-----------( 207      ( 11 Dec 2023 06:24 )             34.7       12-11    136  |  104  |  18  ----------------------------<  99  4.6   |  24  |  1.30    Ca    10.7<H>      11 Dec 2023 06:24  Phos  2.2     12-11  Mg     1.8     12-11        CAPILLARY BLOOD GLUCOSE      POCT Blood Glucose.: 165 mg/dL (11 Dec 2023 12:11)  POCT Blood Glucose.: 98 mg/dL (11 Dec 2023 07:24)  POCT Blood Glucose.: 135 mg/dL (10 Dec 2023 21:15)  POCT Blood Glucose.: 99 mg/dL (10 Dec 2023 17:18)      PT/INR - ( 11 Dec 2023 06:24 )   PT: 13.4 sec;   INR: 1.29 ratio         PTT - ( 11 Dec 2023 06:24 )  PTT:37.1 sec    LOWER EXTREMITY PHYSICAL EXAM:    Vascular: DP/PT 0/4, B/L, CFT <delayed B/L, Temperature gradient wnl, B/L.   Neuro: Epicritic sensation absent to the level of feet, B/L.  Musculoskeletal/Ortho: Osteomyleitis right foot with exposed bone right 3rd toe  Ulcer left foot with possible osteomyelitis left foot  Severe PVD both feet Patient is a 53y old  Male who presents with a chief complaint of c/f osteomyelitis (11 Dec 2023 12:43)      HPI:  Tee Salvador is a 54 y/o M with past medical history significant for IDDM, CAD s/p CABG x4v (2015) s/p AICD (2016), PVD (4 stents in RLE), HTN, HLD and ESRD previously on HD via LUE AVF (ligated 9/8/23) now s/p DDRT 7/16/2023 with Simulect -> Thymoglobulin induction. His post-transplant course was complicated by DGF and subtly AMR with +DSAs s/p PLEX/IVIG (8/2023) and L subclavian thrombus remains on Eliquis presents to [facility] ED from wound care clinic on 12/6/23 after concern for osteomyelitis from clinic providers.     Per patient, he has been following with wound clinic. Reports he had a blister on his L anterior foot that had popped and scabbed over. Also reports hearing a "pop" with an inability to walk on his L leg, and attributes this with why he is now ambulating with a cane. He also reports "the doc said the Right foot is worse" reporting during exam today they were able to "touch the bone". He denies foul smelling foot, odors, discharge from wounds. Denies fevers, chills. Reports no changes in blood sugars at home, other than "the up and down" with controlled glucose in AM then elevated to 200s sometimes during the day. Denies dysuria, abdominal pain.  (06 Dec 2023 16:33)      PAST MEDICAL & SURGICAL HISTORY:  Diabetes mellitus  type 2      Foot ulcer due to secondary DM      Coronary artery disease      Acute on chronic systolic heart failure      H/O kidney transplant      Breast Reduction  at age 18      Toe amputation status, left      S/P CABG (coronary artery bypass graft)      AICD (automatic cardioverter/defibrillator) present          MEDICATIONS  (STANDING):  apixaban 5 milliGRAM(s) Oral every 12 hours  aspirin enteric coated 81 milliGRAM(s) Oral daily  atorvastatin 40 milliGRAM(s) Oral at bedtime  carvedilol 3.125 milliGRAM(s) Oral every 12 hours  famotidine    Tablet 20 milliGRAM(s) Oral daily  imipenem/cilastatin  IVPB 1000 milliGRAM(s) IV Intermittent every 8 hours  insulin glargine Injectable (LANTUS) 10 Unit(s) SubCutaneous at bedtime  insulin lispro (ADMELOG) corrective regimen sliding scale   SubCutaneous at bedtime  insulin lispro (ADMELOG) corrective regimen sliding scale   SubCutaneous three times a day before meals  insulin lispro Injectable (ADMELOG) 10 Unit(s) SubCutaneous three times a day with meals  predniSONE   Tablet 10 milliGRAM(s) Oral daily  trimethoprim   80 mG/sulfamethoxazole 400 mG 1 Tablet(s) Oral daily    MEDICATIONS  (PRN):      Allergies    Contrast. (Unknown)    Intolerances        VITALS:    Vital Signs Last 24 Hrs  T(C): 36.5 (11 Dec 2023 13:32), Max: 36.9 (10 Dec 2023 17:00)  T(F): 97.7 (11 Dec 2023 13:21), Max: 98.5 (10 Dec 2023 17:00)  HR: 71 (11 Dec 2023 13:32) (71 - 81)  BP: 128/54 (11 Dec 2023 13:32) (117/58 - 171/81)  BP(mean): 78 (11 Dec 2023 13:32) (78 - 78)  RR: 17 (11 Dec 2023 13:32) (17 - 18)  SpO2: 100% (11 Dec 2023 13:32) (98% - 100%)    Parameters below as of 11 Dec 2023 13:21  Patient On (Oxygen Delivery Method): room air        LABS:                          11.0   2.85  )-----------( 207      ( 11 Dec 2023 06:24 )             34.7       12-11    136  |  104  |  18  ----------------------------<  99  4.6   |  24  |  1.30    Ca    10.7<H>      11 Dec 2023 06:24  Phos  2.2     12-11  Mg     1.8     12-11        CAPILLARY BLOOD GLUCOSE      POCT Blood Glucose.: 165 mg/dL (11 Dec 2023 12:11)  POCT Blood Glucose.: 98 mg/dL (11 Dec 2023 07:24)  POCT Blood Glucose.: 135 mg/dL (10 Dec 2023 21:15)  POCT Blood Glucose.: 99 mg/dL (10 Dec 2023 17:18)      PT/INR - ( 11 Dec 2023 06:24 )   PT: 13.4 sec;   INR: 1.29 ratio         PTT - ( 11 Dec 2023 06:24 )  PTT:37.1 sec    LOWER EXTREMITY PHYSICAL EXAM:    Vascular: DP/PT 0/4, B/L, CFT <delayed B/L, Temperature gradient wnl, B/L.   Neuro: Epicritic sensation absent to the level of feet, B/L.  Musculoskeletal/Ortho: Osteomyleitis right foot with exposed bone right 3rd toe  Ulcer left foot with possible osteomyelitis left foot  Severe PVD both feet

## 2023-12-11 NOTE — PROGRESS NOTE ADULT - ASSESSMENT
ASSESSMENT: 52 y/o M with PMH of HTN, CHF (s/p AICD 2016), CAD (s/p CABG 2015), IDDM, PVD s/p stent x4 in RLE with R big toe/second toe amputation (2021) and ESRD on HD via LUE AVF for 6 years now s/p DDRT 7/16/2023 presenting to the ED with infected R 3rd toe and left 1st metatarsal ulcers. Patient follows with Dr. Malone of Vascular Surgery. Patient states he has had these wounds for a while, follows with Podiatry. However, states that over the past few weeks, they have begun to look worse, specifically the left 1st metatarsal ulcer. Vascular surgery consulted for evaluation.    Discussed plan with attending Dr. Malone. Dr. Malone would like podiatry to proceed to OR on 12/11 for their planned procedures. Based on the findings intraoperatively, plan for angiographic imaging will be made. Podiatry should proceed as planned with planned procedures.    - Cx +klebsiella, E faecalis, M Morganii, Profidencidwayne Pagan  - OR today with podiatry  - will consider revasc postop from pods procedure  - vascular will follow    Vascular  [id number]. ASSESSMENT: 54 y/o M with PMH of HTN, CHF (s/p AICD 2016), CAD (s/p CABG 2015), IDDM, PVD s/p stent x4 in RLE with R big toe/second toe amputation (2021) and ESRD on HD via LUE AVF for 6 years now s/p DDRT 7/16/2023 presenting to the ED with infected R 3rd toe and left 1st metatarsal ulcers. Patient follows with Dr. Malone of Vascular Surgery. Patient states he has had these wounds for a while, follows with Podiatry. However, states that over the past few weeks, they have begun to look worse, specifically the left 1st metatarsal ulcer. Vascular surgery consulted for evaluation.    Discussed plan with attending Dr. Malone. Dr. Malone would like podiatry to proceed to OR on 12/11 for their planned procedures. Based on the findings intraoperatively, plan for angiographic imaging will be made. Podiatry should proceed as planned with planned procedures.    - Cx +klebsiella, E faecalis, M Ralphii, Nohemi Pagan  - OR today with podiatry for right toe 3 intervention  based on intraop findings will d/w proceeding w   right or left lle angio  pt has been cleared by transplant surg for le angio and pt consents   - vascular will follow    Vascular  [id number]. ASSESSMENT: 52 y/o M with PMH of HTN, CHF (s/p AICD 2016), CAD (s/p CABG 2015), IDDM, PVD s/p stent x4 in RLE with R big toe/second toe amputation (2021) and ESRD on HD via LUE AVF for 6 years now s/p DDRT 7/16/2023 presenting to the ED with infected R 3rd toe and left 1st metatarsal ulcers. Patient follows with Dr. Malone of Vascular Surgery. Patient states he has had these wounds for a while, follows with Podiatry. However, states that over the past few weeks, they have begun to look worse, specifically the left 1st metatarsal ulcer. Vascular surgery consulted for evaluation.    Discussed plan with attending Dr. Malone. Dr. Malone would like podiatry to proceed to OR on 12/11 for their planned procedures. Based on the findings intraoperatively, plan for angiographic imaging will be made. Podiatry should proceed as planned with planned procedures.    - Cx +klebsiella, E faecalis, M Ralphii, Nohemi Pagan  - OR today with podiatry for right toe 3 intervention  based on intraop findings will d/w proceeding w   right or left lle angio  pt has been cleared by transplant surg for le angio and pt consents   - vascular will follow    Vascular  [id number].

## 2023-12-11 NOTE — PRE-ANESTHESIA EVALUATION ADULT - NSANTHPMHFT_GEN_ALL_CORE
54 y/o M with past medical history significant for IDDM,  CAD s/p CABG x4v (2015) s/p AICD (2016), PVD (4 stents in RLE), HTN, HLD and  ESRD previously on HD via LUE AVF (ligated 9/8/23) now s/p DDRT 7/16/2023 with  Simulect -> Thymoglobulin induction. His post-transplant course was complicated  by DGF and subtly AMR with +DSAs s/p PLEX/IVIG (8/2023) and L subclavian  thrombus remains on Eliquis presents to [facility] ED from wound care clinic on  12/6/23 after concern for osteomyelitis from clinic providers. 52 y/o M with past medical history significant for IDDM,  CAD s/p CABG x4v (2015) s/p AICD (2016), PVD (4 stents in RLE), HTN, HLD and  ESRD previously on HD via LUE AVF (ligated 9/8/23) now s/p DDRT 7/16/2023 with  Simulect -> Thymoglobulin induction. His post-transplant course was complicated  by DGF and subtly AMR with +DSAs s/p PLEX/IVIG (8/2023) and L subclavian  thrombus remains on Eliquis presents to [facility] ED from wound care clinic on  12/6/23 after concern for osteomyelitis from clinic providers.

## 2023-12-11 NOTE — PROGRESS NOTE ADULT - ASSESSMENT
54 y/o M with past medical history significant for IDDM, CAD s/p CABG x4v (2015) s/p AICD (2016), PVD (4 stents in RLE), HTN, HLD and ESRD previously on HD via LUE AVF (ligated 9/8/23) now s/p DDRT 7/16/2023 with Simulect -> Thymoglobulin induction. His post-transplant course was complicated by DGF and subtly AMR with +DSAs s/p PLEX/IVIG (8/2023) and L subclavian thrombus remains on Eliquis presents to [facility] ED from wound care clinic on 12/6/23 after concern for osteomyelitis from clinic providers.     [] Osteomyelitis of R foot third digit and left foot 1st toe  - Left foot wound culture growing ESBL Klebsiella, E. Faecalis, M. Morganii, Profidencia Stauartii   - OR today with podiatry for R 3rd toe amputation and L foot debridement  - Vascular team following: plan for intra op angio   - ID following: Continue Imipenem (12/10--)   - Blood cultures with NGTD   - Will discuss with TID for ?PICC vs Midline needs for long term antibiotics     [ ] s/p DDRT 7/16/2023   - Baseline Cr ~1.0   - Immuno: Env by level, MMF HELD d/t c/f for osteomyelitis, Pred 10   - PPx: Bactrim, Pepcid   - Valcyte held for leukopenia (CMV negative 12/7)     [ ] IDDM  - Continue Lantus/Lispro home dose     [ ] CHF, CAD s/p CABG, L SC DVT   - Continue Eliquis, ASA 81   - Continue Coreg, Lipitor          54 y/o M with past medical history significant for IDDM, CAD s/p CABG x4v (2015) s/p AICD (2016), PVD (4 stents in RLE), HTN, HLD and ESRD previously on HD via LUE AVF (ligated 9/8/23) now s/p DDRT 7/16/2023 with Simulect -> Thymoglobulin induction. His post-transplant course was complicated by DGF and subtly AMR with +DSAs s/p PLEX/IVIG (8/2023) and L subclavian thrombus remains on Eliquis presents to [facility] ED from wound care clinic on 12/6/23 after concern for osteomyelitis from clinic providers.     [] Osteomyelitis of R foot third digit and left foot 1st toe  - Left foot wound culture growing ESBL Klebsiella, E. Faecalis, M. Morganii, Profidencia Stauartii   - OR today with podiatry for R 3rd toe amputation and L foot debridement  - Vascular team following: plan for intra op angio   - ID following: Continue Imipenem (12/10--)   - Blood cultures with NGTD   - Will discuss with TID for ?PICC vs Midline needs for long term antibiotics     [ ] s/p DDRT 7/16/2023   - Baseline Cr ~1.0   - Immuno: Env by level, MMF HELD d/t c/f for osteomyelitis, Pred 10   - PPx: Bactrim, Pepcid   - Valcyte held for leukopenia (CMV negative 12/7)     [ ] IDDM  - Continue Lantus/Lispro home dose     [ ] CHF, CAD s/p CABG, L SC DVT   - Continue Eliquis, ASA 81   - Continue Coreg, Lipitor

## 2023-12-11 NOTE — DISCHARGE NOTE PROVIDER - NSDCMRMEDTOKEN_GEN_ALL_CORE_FT
aspirin 81 mg oral delayed release tablet: 1 tab(s) orally once a day  atorvastatin 40 mg oral tablet: 1 tab(s) orally once a day (at bedtime)  carvedilol 3.125 mg oral tablet: 1 tab(s) orally every 12 hours  Eliquis 5 mg oral tablet: 1 tab(s) orally 2 times a day  HumaLOG KwikPen 100 units/mL injectable solution: 10 unit(s) injectable 3 times a day (before meals)  insulin glargine 100 units/mL subcutaneous solution: 21 unit(s) subcutaneous once a day (at bedtime)  mycophenolate mofetil 250 mg oral capsule: 1,000 milligram(s) orally 2 times a day  Pepcid 20 mg oral tablet: 1 tab(s) orally once a day  Prednicot 5 mg oral tablet: 2 tab(s) orally once a day  senna leaf extract oral tablet: 2 tab(s) orally once a day (at bedtime)  sulfamethoxazole-trimethoprim 400 mg-80 mg oral tablet: 1 tab(s) orally once a day  tacrolimus 1 mg oral tablet, extended release: 5 tab(s) orally once a day  valGANciclovir 450 mg oral tablet: 1 tab(s) orally once a day   aspirin 81 mg oral delayed release tablet: 1 tab(s) orally once a day  atorvastatin 40 mg oral tablet: 1 tab(s) orally once a day (at bedtime)  carvedilol 3.125 mg oral tablet: 1 tab(s) orally every 12 hours  Eliquis 5 mg oral tablet: 1 tab(s) orally 2 times a day  HumaLOG KwikPen 100 units/mL injectable solution: 10 unit(s) injectable 3 times a day (before meals)  imipenem-cilastatin 500 mg-500 mg intravenous injection: 500 milligram(s) intravenously every 6 hours  insulin glargine 100 units/mL subcutaneous solution: 21 unit(s) subcutaneous once a day (at bedtime)  mycophenolate mofetil 250 mg oral capsule: 1,000 milligram(s) orally 2 times a day  Pepcid 20 mg oral tablet: 1 tab(s) orally once a day  Prednicot 5 mg oral tablet: 2 tab(s) orally once a day  senna leaf extract oral tablet: 2 tab(s) orally once a day (at bedtime)  sulfamethoxazole-trimethoprim 400 mg-80 mg oral tablet: 1 tab(s) orally once a day  tacrolimus 1 mg oral tablet, extended release: 5 tab(s) orally once a day  valGANciclovir 450 mg oral tablet: 1 tab(s) orally once a day   aspirin 81 mg oral delayed release tablet: 1 tab(s) orally once a day  atorvastatin 40 mg oral tablet: 1 tab(s) orally once a day (at bedtime)  carvedilol 3.125 mg oral tablet: 1 tab(s) orally every 12 hours  Eliquis 5 mg oral tablet: 1 tab(s) orally 2 times a day  HumaLOG KwikPen 100 units/mL injectable solution: 10 unit(s) injectable 3 times a day (before meals)  imipenem-cilastatin 500 mg-500 mg intravenous injection: 500 milligram(s) intravenously every 6 hours  insulin glargine 100 units/mL subcutaneous solution: 21 unit(s) subcutaneous once a day (at bedtime)  mycophenolate mofetil 250 mg oral capsule: 1,000 milligram(s) orally 2 times a day  Pepcid 20 mg oral tablet: 1 tab(s) orally once a day  Prednicot 5 mg oral tablet: 2 tab(s) orally once a day  senna leaf extract oral tablet: 2 tab(s) orally once a day (at bedtime)  sulfamethoxazole-trimethoprim 400 mg-80 mg oral tablet: 1 tab(s) orally once a day  tacrolimus 1 mg oral tablet, extended release: 5 tab(s) orally once a day   aspirin 81 mg oral delayed release tablet: 1 tab(s) orally once a day  atorvastatin 40 mg oral tablet: 1 tab(s) orally once a day (at bedtime)  carvedilol 3.125 mg oral tablet: 1 tab(s) orally every 12 hours  Eliquis 5 mg oral tablet: 1 tab(s) orally 2 times a day  HumaLOG KwikPen 100 units/mL injectable solution: 10 unit(s) injectable 3 times a day (before meals)  imipenem-cilastatin 500 mg-500 mg intravenous injection: 500 milligram(s) intravenously every 6 hours  insulin glargine 100 units/mL subcutaneous solution: 21 unit(s) subcutaneous once a day (at bedtime)  mycophenolate mofetil 250 mg oral capsule: 500 milligram(s) orally 2 times a day  Pepcid 20 mg oral tablet: 1 tab(s) orally once a day  Prednicot 5 mg oral tablet: 2 tab(s) orally once a day  sulfamethoxazole-trimethoprim 400 mg-80 mg oral tablet: 1 tab(s) orally once a day  tacrolimus 1 mg oral tablet, extended release: 2 tab(s) orally once a day   aspirin 81 mg oral delayed release tablet: 1 tab(s) orally once a day  atorvastatin 40 mg oral tablet: 1 tab(s) orally once a day (at bedtime)  carvedilol 3.125 mg oral tablet: 1 tab(s) orally every 12 hours  Eliquis 5 mg oral tablet: 1 tab(s) orally 2 times a day  HumaLOG KwikPen 100 units/mL injectable solution: 10 unit(s) injectable 3 times a day (before meals)  imipenem-cilastatin 500 mg-500 mg intravenous injection: 500 milligram(s) intravenously every 6 hours  insulin glargine 100 units/mL subcutaneous solution: 21 unit(s) subcutaneous once a day (at bedtime)  mycophenolate mofetil 250 mg oral capsule: 500 milligram(s) orally 2 times a day  Pepcid 20 mg oral tablet: 1 tab(s) orally once a day  predniSONE 10 mg oral tablet: 1 tab(s) orally once a day  sulfamethoxazole-trimethoprim 400 mg-80 mg oral tablet: 1 tab(s) orally once a day  tacrolimus 1 mg oral tablet, extended release: 2 tab(s) orally once a day

## 2023-12-11 NOTE — DISCHARGE NOTE PROVIDER - NSDCFUSCHEDAPPT_GEN_ALL_CORE_FT
[facility]  ENDOCRIN [address]  Scheduled Appointment: 01/25/2024     [facility]  ENDOCRIN [address]  Scheduled Appointment: 01/25/2024     [facility]  ENDOCRIN [address]  Scheduled Appointment: 01/25/2024    Vik Hairston  [facility]  NEPHRO [address] [address]  Scheduled Appointment: 03/11/2024     [facility]  ENDOCRIN [address]  Scheduled Appointment: 01/25/2024    Vik Hairston  [facility]  NEPHRO [address] [facility] [address]  Scheduled Appointment: 03/11/2024

## 2023-12-11 NOTE — DISCHARGE NOTE PROVIDER - NSDCFUADDAPPT_GEN_ALL_CORE_FT
Podiatry Discharge Instructions:  Follow up: Please follow up with Dr. Myers within 1 week of discharge from the hospital, please call [phone] for appointment and discuss that you recently were seen in the hospital.  Wound Care: Please leave your dressing clean dry intact until your follow up appointment   Weight bearing: Please weight bear as tolerated in a surgical shoe.  Antibiotics: Please continue as instructed. Podiatry Discharge Instructions:  Follow up: Please follow up with Dr. Myers within 1 week of discharge from the hospital, please call [phone] for appointment and discuss that you recently were seen in the hospital.  Wound Care: Please leave your dressing clean dry intact until your follow up appointment   Weight bearing: Please weight bear as tolerated in a surgical shoe.  Antibiotics: Please continue as instructed.

## 2023-12-11 NOTE — BRIEF OPERATIVE NOTE - COMMENTS
s/p R foot Partial Third Ray Resection, closed, L foot wound debridement with graft application  - Low concern for viability/ residual bone infection  - discharge pending no growth of the clean bone margin culture x 48 hours

## 2023-12-12 LAB
ANION GAP SERPL CALC-SCNC: 9 MMOL/L — SIGNIFICANT CHANGE UP (ref 5–17)
ANION GAP SERPL CALC-SCNC: 9 MMOL/L — SIGNIFICANT CHANGE UP (ref 5–17)
BASOPHILS # BLD AUTO: 0.04 K/UL — SIGNIFICANT CHANGE UP (ref 0–0.2)
BASOPHILS # BLD AUTO: 0.04 K/UL — SIGNIFICANT CHANGE UP (ref 0–0.2)
BASOPHILS NFR BLD AUTO: 1.3 % — SIGNIFICANT CHANGE UP (ref 0–2)
BASOPHILS NFR BLD AUTO: 1.3 % — SIGNIFICANT CHANGE UP (ref 0–2)
BUN SERPL-MCNC: 23 MG/DL — SIGNIFICANT CHANGE UP (ref 7–23)
BUN SERPL-MCNC: 23 MG/DL — SIGNIFICANT CHANGE UP (ref 7–23)
CALCIUM SERPL-MCNC: 10 MG/DL — SIGNIFICANT CHANGE UP (ref 8.4–10.5)
CALCIUM SERPL-MCNC: 10 MG/DL — SIGNIFICANT CHANGE UP (ref 8.4–10.5)
CHLORIDE SERPL-SCNC: 106 MMOL/L — SIGNIFICANT CHANGE UP (ref 96–108)
CHLORIDE SERPL-SCNC: 106 MMOL/L — SIGNIFICANT CHANGE UP (ref 96–108)
CO2 SERPL-SCNC: 25 MMOL/L — SIGNIFICANT CHANGE UP (ref 22–31)
CO2 SERPL-SCNC: 25 MMOL/L — SIGNIFICANT CHANGE UP (ref 22–31)
CREAT SERPL-MCNC: 1.54 MG/DL — HIGH (ref 0.5–1.3)
CREAT SERPL-MCNC: 1.54 MG/DL — HIGH (ref 0.5–1.3)
EGFR: 54 ML/MIN/1.73M2 — LOW
EGFR: 54 ML/MIN/1.73M2 — LOW
EOSINOPHIL # BLD AUTO: 0 K/UL — SIGNIFICANT CHANGE UP (ref 0–0.5)
EOSINOPHIL # BLD AUTO: 0 K/UL — SIGNIFICANT CHANGE UP (ref 0–0.5)
EOSINOPHIL NFR BLD AUTO: 0 % — SIGNIFICANT CHANGE UP (ref 0–6)
EOSINOPHIL NFR BLD AUTO: 0 % — SIGNIFICANT CHANGE UP (ref 0–6)
GLUCOSE BLDC GLUCOMTR-MCNC: 112 MG/DL — HIGH (ref 70–99)
GLUCOSE BLDC GLUCOMTR-MCNC: 112 MG/DL — HIGH (ref 70–99)
GLUCOSE BLDC GLUCOMTR-MCNC: 119 MG/DL — HIGH (ref 70–99)
GLUCOSE BLDC GLUCOMTR-MCNC: 119 MG/DL — HIGH (ref 70–99)
GLUCOSE BLDC GLUCOMTR-MCNC: 132 MG/DL — HIGH (ref 70–99)
GLUCOSE BLDC GLUCOMTR-MCNC: 132 MG/DL — HIGH (ref 70–99)
GLUCOSE BLDC GLUCOMTR-MCNC: 147 MG/DL — HIGH (ref 70–99)
GLUCOSE BLDC GLUCOMTR-MCNC: 147 MG/DL — HIGH (ref 70–99)
GLUCOSE BLDC GLUCOMTR-MCNC: 171 MG/DL — HIGH (ref 70–99)
GLUCOSE BLDC GLUCOMTR-MCNC: 171 MG/DL — HIGH (ref 70–99)
GLUCOSE BLDC GLUCOMTR-MCNC: 183 MG/DL — HIGH (ref 70–99)
GLUCOSE BLDC GLUCOMTR-MCNC: 183 MG/DL — HIGH (ref 70–99)
GLUCOSE SERPL-MCNC: 172 MG/DL — HIGH (ref 70–99)
GLUCOSE SERPL-MCNC: 172 MG/DL — HIGH (ref 70–99)
GRAM STN FLD: ABNORMAL
GRAM STN FLD: ABNORMAL
GRAM STN FLD: SIGNIFICANT CHANGE UP
HCT VFR BLD CALC: 31.7 % — LOW (ref 39–50)
HCT VFR BLD CALC: 31.7 % — LOW (ref 39–50)
HGB BLD-MCNC: 10 G/DL — LOW (ref 13–17)
HGB BLD-MCNC: 10 G/DL — LOW (ref 13–17)
IMM GRANULOCYTES NFR BLD AUTO: 4.4 % — HIGH (ref 0–0.9)
IMM GRANULOCYTES NFR BLD AUTO: 4.4 % — HIGH (ref 0–0.9)
LYMPHOCYTES # BLD AUTO: 0.27 K/UL — LOW (ref 1–3.3)
LYMPHOCYTES # BLD AUTO: 0.27 K/UL — LOW (ref 1–3.3)
LYMPHOCYTES # BLD AUTO: 8.5 % — LOW (ref 13–44)
LYMPHOCYTES # BLD AUTO: 8.5 % — LOW (ref 13–44)
MAGNESIUM SERPL-MCNC: 1.9 MG/DL — SIGNIFICANT CHANGE UP (ref 1.6–2.6)
MAGNESIUM SERPL-MCNC: 1.9 MG/DL — SIGNIFICANT CHANGE UP (ref 1.6–2.6)
MCHC RBC-ENTMCNC: 28.2 PG — SIGNIFICANT CHANGE UP (ref 27–34)
MCHC RBC-ENTMCNC: 28.2 PG — SIGNIFICANT CHANGE UP (ref 27–34)
MCHC RBC-ENTMCNC: 31.5 GM/DL — LOW (ref 32–36)
MCHC RBC-ENTMCNC: 31.5 GM/DL — LOW (ref 32–36)
MCV RBC AUTO: 89.5 FL — SIGNIFICANT CHANGE UP (ref 80–100)
MCV RBC AUTO: 89.5 FL — SIGNIFICANT CHANGE UP (ref 80–100)
MONOCYTES # BLD AUTO: 0.57 K/UL — SIGNIFICANT CHANGE UP (ref 0–0.9)
MONOCYTES # BLD AUTO: 0.57 K/UL — SIGNIFICANT CHANGE UP (ref 0–0.9)
MONOCYTES NFR BLD AUTO: 17.9 % — HIGH (ref 2–14)
MONOCYTES NFR BLD AUTO: 17.9 % — HIGH (ref 2–14)
NEUTROPHILS # BLD AUTO: 2.16 K/UL — SIGNIFICANT CHANGE UP (ref 1.8–7.4)
NEUTROPHILS # BLD AUTO: 2.16 K/UL — SIGNIFICANT CHANGE UP (ref 1.8–7.4)
NEUTROPHILS NFR BLD AUTO: 67.9 % — SIGNIFICANT CHANGE UP (ref 43–77)
NEUTROPHILS NFR BLD AUTO: 67.9 % — SIGNIFICANT CHANGE UP (ref 43–77)
NRBC # BLD: 0 /100 WBCS — SIGNIFICANT CHANGE UP (ref 0–0)
NRBC # BLD: 0 /100 WBCS — SIGNIFICANT CHANGE UP (ref 0–0)
PHOSPHATE SERPL-MCNC: 2.7 MG/DL — SIGNIFICANT CHANGE UP (ref 2.5–4.5)
PHOSPHATE SERPL-MCNC: 2.7 MG/DL — SIGNIFICANT CHANGE UP (ref 2.5–4.5)
PLATELET # BLD AUTO: 198 K/UL — SIGNIFICANT CHANGE UP (ref 150–400)
PLATELET # BLD AUTO: 198 K/UL — SIGNIFICANT CHANGE UP (ref 150–400)
POTASSIUM SERPL-MCNC: 4.6 MMOL/L — SIGNIFICANT CHANGE UP (ref 3.5–5.3)
POTASSIUM SERPL-MCNC: 4.6 MMOL/L — SIGNIFICANT CHANGE UP (ref 3.5–5.3)
POTASSIUM SERPL-SCNC: 4.6 MMOL/L — SIGNIFICANT CHANGE UP (ref 3.5–5.3)
POTASSIUM SERPL-SCNC: 4.6 MMOL/L — SIGNIFICANT CHANGE UP (ref 3.5–5.3)
RBC # BLD: 3.54 M/UL — LOW (ref 4.2–5.8)
RBC # BLD: 3.54 M/UL — LOW (ref 4.2–5.8)
RBC # FLD: 13.1 % — SIGNIFICANT CHANGE UP (ref 10.3–14.5)
RBC # FLD: 13.1 % — SIGNIFICANT CHANGE UP (ref 10.3–14.5)
SODIUM SERPL-SCNC: 140 MMOL/L — SIGNIFICANT CHANGE UP (ref 135–145)
SODIUM SERPL-SCNC: 140 MMOL/L — SIGNIFICANT CHANGE UP (ref 135–145)
SPECIMEN SOURCE: SIGNIFICANT CHANGE UP
TACROLIMUS SERPL-MCNC: 6.6 NG/ML — SIGNIFICANT CHANGE UP
TACROLIMUS SERPL-MCNC: 6.6 NG/ML — SIGNIFICANT CHANGE UP
WBC # BLD: 3.18 K/UL — LOW (ref 3.8–10.5)
WBC # BLD: 3.18 K/UL — LOW (ref 3.8–10.5)
WBC # FLD AUTO: 3.18 K/UL — LOW (ref 3.8–10.5)
WBC # FLD AUTO: 3.18 K/UL — LOW (ref 3.8–10.5)

## 2023-12-12 PROCEDURE — 99232 SBSQ HOSP IP/OBS MODERATE 35: CPT

## 2023-12-12 PROCEDURE — 99232 SBSQ HOSP IP/OBS MODERATE 35: CPT | Mod: GC

## 2023-12-12 RX ORDER — IMIPENEM AND CILASTATIN 250; 250 MG/100ML; MG/100ML
INJECTION, POWDER, FOR SOLUTION INTRAVENOUS
Refills: 0 | Status: DISCONTINUED | OUTPATIENT
Start: 2023-12-12 | End: 2023-12-16

## 2023-12-12 RX ORDER — APIXABAN 2.5 MG/1
5 TABLET, FILM COATED ORAL EVERY 12 HOURS
Refills: 0 | Status: DISCONTINUED | OUTPATIENT
Start: 2023-12-13 | End: 2023-12-13

## 2023-12-12 RX ORDER — IMIPENEM AND CILASTATIN 250; 250 MG/100ML; MG/100ML
500 INJECTION, POWDER, FOR SOLUTION INTRAVENOUS ONCE
Refills: 0 | Status: COMPLETED | OUTPATIENT
Start: 2023-12-12 | End: 2023-12-12

## 2023-12-12 RX ORDER — TACROLIMUS 5 MG/1
1 CAPSULE ORAL
Refills: 0 | Status: DISCONTINUED | OUTPATIENT
Start: 2023-12-13 | End: 2023-12-14

## 2023-12-12 RX ORDER — IMIPENEM AND CILASTATIN 250; 250 MG/100ML; MG/100ML
500 INJECTION, POWDER, FOR SOLUTION INTRAVENOUS EVERY 6 HOURS
Refills: 0 | Status: DISCONTINUED | OUTPATIENT
Start: 2023-12-12 | End: 2023-12-16

## 2023-12-12 RX ORDER — TACROLIMUS 5 MG/1
1 CAPSULE ORAL ONCE
Refills: 0 | Status: COMPLETED | OUTPATIENT
Start: 2023-12-12 | End: 2023-12-12

## 2023-12-12 RX ADMIN — Medication 81 MILLIGRAM(S): at 11:52

## 2023-12-12 RX ADMIN — TACROLIMUS 1 MILLIGRAM(S): 5 CAPSULE ORAL at 15:17

## 2023-12-12 RX ADMIN — Medication 2: at 09:51

## 2023-12-12 RX ADMIN — IMIPENEM AND CILASTATIN 100 MILLIGRAM(S): 250; 250 INJECTION, POWDER, FOR SOLUTION INTRAVENOUS at 11:50

## 2023-12-12 RX ADMIN — Medication 1 TABLET(S): at 11:52

## 2023-12-12 RX ADMIN — Medication 10 UNIT(S): at 17:27

## 2023-12-12 RX ADMIN — CARVEDILOL PHOSPHATE 3.12 MILLIGRAM(S): 80 CAPSULE, EXTENDED RELEASE ORAL at 17:29

## 2023-12-12 RX ADMIN — FAMOTIDINE 20 MILLIGRAM(S): 10 INJECTION INTRAVENOUS at 11:52

## 2023-12-12 RX ADMIN — Medication 10 UNIT(S): at 09:51

## 2023-12-12 RX ADMIN — CARVEDILOL PHOSPHATE 3.12 MILLIGRAM(S): 80 CAPSULE, EXTENDED RELEASE ORAL at 05:47

## 2023-12-12 RX ADMIN — IMIPENEM AND CILASTATIN 100 MILLIGRAM(S): 250; 250 INJECTION, POWDER, FOR SOLUTION INTRAVENOUS at 17:29

## 2023-12-12 RX ADMIN — IMIPENEM AND CILASTATIN 100 MILLIGRAM(S): 250; 250 INJECTION, POWDER, FOR SOLUTION INTRAVENOUS at 23:36

## 2023-12-12 RX ADMIN — ATORVASTATIN CALCIUM 40 MILLIGRAM(S): 80 TABLET, FILM COATED ORAL at 21:09

## 2023-12-12 RX ADMIN — Medication 10 MILLIGRAM(S): at 05:38

## 2023-12-12 RX ADMIN — INSULIN GLARGINE 10 UNIT(S): 100 INJECTION, SOLUTION SUBCUTANEOUS at 21:22

## 2023-12-12 NOTE — PROGRESS NOTE ADULT - SUBJECTIVE AND OBJECTIVE BOX
Subjective: Patient seen and examined. No new events except as noted.     SUBJECTIVE/ROS:  s/p surgery       MEDICATIONS:  MEDICATIONS  (STANDING):  aspirin enteric coated 81 milliGRAM(s) Oral daily  atorvastatin 40 milliGRAM(s) Oral at bedtime  carvedilol 3.125 milliGRAM(s) Oral every 12 hours  famotidine    Tablet 20 milliGRAM(s) Oral daily  insulin glargine Injectable (LANTUS) 10 Unit(s) SubCutaneous at bedtime  insulin lispro (ADMELOG) corrective regimen sliding scale   SubCutaneous three times a day before meals  insulin lispro Injectable (ADMELOG) 10 Unit(s) SubCutaneous three times a day with meals  predniSONE   Tablet 10 milliGRAM(s) Oral daily  trimethoprim   80 mG/sulfamethoxazole 400 mG 1 Tablet(s) Oral daily      PHYSICAL EXAM:  T(C): 36.5 (12-12-23 @ 08:17), Max: 36.8 (12-11-23 @ 21:00)  HR: 73 (12-12-23 @ 08:17) (48 - 78)  BP: 146/66 (12-12-23 @ 08:17) (104/49 - 148/74)  RR: 18 (12-12-23 @ 08:17) (15 - 18)  SpO2: 97% (12-12-23 @ 08:17) (97% - 100%)  Wt(kg): --  I&O's Summary    11 Dec 2023 07:01  -  12 Dec 2023 07:00  --------------------------------------------------------  IN: 240 mL / OUT: 1550 mL / NET: -1310 mL      Height (cm): 175.3 (12-11 @ 13:32)  Weight (kg): 89.4 (12-11 @ 13:32)  BMI (kg/m2): 29.1 (12-11 @ 13:32)  BSA (m2): 2.05 (12-11 @ 13:32)      JVP: Normal  Neck: supple  Lung: clear   CV: S1 S2 , Murmur:  Abd: soft  Ext: No edema  neuro: Awake / alert  Psych: flat affect      LABS/DATA:    CARDIAC MARKERS:                                10.0   3.18  )-----------( 198      ( 12 Dec 2023 07:04 )             31.7     12-12    140  |  106  |  23  ----------------------------<  172<H>  4.6   |  25  |  1.54<H>    Ca    10.0      12 Dec 2023 07:00  Phos  2.7     12-12  Mg     1.9     12-12      proBNP:   Lipid Profile:   HgA1c:   TSH:     TELE:  EKG:

## 2023-12-12 NOTE — PROGRESS NOTE ADULT - ASSESSMENT
Left foot:    Status post amputation of the fifth metatarsal and proximal aspect of the   fifth proximal phalanx. Cortical irregularity along the fifth distal   phalanx, which may represent osteomyelitis. Correlate clinically for   overlying ulcer. MRI can be completed for further evaluation as   clinically indicated.    No acute displaced fracture or dislocation.  Tee Salvador is a 52 y/o M with past medical history significant for HTN, CHF (s/p AICD 2016), CAD (s/p CABG 2015), IDDM, PVD s/p stent x4 in RLE with R big toe/second toe amputation (2021) and ESRD on HD via LUE AVF for 6 years now s/p DDRT 7/16/2023 with Thymoglobulin induction. Post-transplant course complicated by severe constipation requiring disimpaction, DGF requiring hemodialysis, admission in August with K pneumoniae UTI treated with cefazolin then keflex. He has chronic bilateral foot wounds followed by wound care. PAtient states in the last week he started noticing some redness around the left leg foot ulcer with no pain or discharge. The podiatrist also noted necrosis of the tip of the third right toe. He was sent to ER for iv antibitoics and management. He has not received any antibiotics. He denies fever but felt chills a week back. On admission, afebrile, WBC 2.4.       Xray   Right foot:  Evaluation of the toes is limited due to patient positioning.  Status post amputation of the first ray to the level of the first   metatarsal head. There is minimal cortical irregularity with mild   progressive lucency within the first metatarsal head, which may represent   underlying osteomyelitis. Correlate clinically for overlying ulcer.  Status post amputation of the second toe to the level of the second   proximal phalanx with mild cortical irregularity along the amputation   margin of the second proximal phalanx, which may represent osteomyelitis.   Correlate clinically for overlying ulcer.      1. S/p DDKT 7/16/23 CMV D+/+, EBV D+/R+  Induction ATG  maintenance. tacro, mmf, pred  PPx; valcyte and bactrim  Complications DGF requiring HD    2. Left foot ulcer and cellulitis  3. Right 3d toe necrotic ulcer  4. History of first ray amputation 8/2021 - tissue cx grew E faecalis, Corynebacterium prevotella spp, Lecrercia spp   CXR with concern for OM although not in areas of concern on exam  Vascular studies with small vessel disease.     Left ulcer culture sent ? debrided tossue: Klebsiella pneumoniae, PsA and E faecalis  zosyn/vanc 12/6-    Recommendations  ·	MRi of both feet with OM of the left first metatarsal head and proximal phalynx of first toe and thrid right toe tip  ·	plan is to perform MRA with vascular  ·	Plan also for amputation of R thrid distal toe on 12/11 and debridement of LEft bone tissue  ·	Please send MARGIN LEFT bone tissue bacterial cx, MARGIn histopath from R toe and MARGIN bacterial cultures . Suspect will still need 6 weeks of antibiotics given no plans to amputate L metatarsal/proximal phalanx  ·	ESR, crp weekly  ·	Meanwhile, cultuures from ulcer superficial debridement with ESBl Klebsiella, PSA and E faecalis.      Unless surgical operative cultures grow additional organisms will plan to treat with IV imipenem x 6weeks total therapy  Patient will need PICC line placement and home care services arranged by primary team    He can follow up in the ID office after discharge    Plan discussed with patient    Jackson Moore MD  Can be called via Teams  After 5pm/weekends [phone]           Left foot:    Status post amputation of the fifth metatarsal and proximal aspect of the   fifth proximal phalanx. Cortical irregularity along the fifth distal   phalanx, which may represent osteomyelitis. Correlate clinically for   overlying ulcer. MRI can be completed for further evaluation as   clinically indicated.    No acute displaced fracture or dislocation.  Tee Salvador is a 54 y/o M with past medical history significant for HTN, CHF (s/p AICD 2016), CAD (s/p CABG 2015), IDDM, PVD s/p stent x4 in RLE with R big toe/second toe amputation (2021) and ESRD on HD via LUE AVF for 6 years now s/p DDRT 7/16/2023 with Thymoglobulin induction. Post-transplant course complicated by severe constipation requiring disimpaction, DGF requiring hemodialysis, admission in August with K pneumoniae UTI treated with cefazolin then keflex. He has chronic bilateral foot wounds followed by wound care. PAtient states in the last week he started noticing some redness around the left leg foot ulcer with no pain or discharge. The podiatrist also noted necrosis of the tip of the third right toe. He was sent to ER for iv antibitoics and management. He has not received any antibiotics. He denies fever but felt chills a week back. On admission, afebrile, WBC 2.4.       Xray   Right foot:  Evaluation of the toes is limited due to patient positioning.  Status post amputation of the first ray to the level of the first   metatarsal head. There is minimal cortical irregularity with mild   progressive lucency within the first metatarsal head, which may represent   underlying osteomyelitis. Correlate clinically for overlying ulcer.  Status post amputation of the second toe to the level of the second   proximal phalanx with mild cortical irregularity along the amputation   margin of the second proximal phalanx, which may represent osteomyelitis.   Correlate clinically for overlying ulcer.      1. S/p DDKT 7/16/23 CMV D+/+, EBV D+/R+  Induction ATG  maintenance. tacro, mmf, pred  PPx; valcyte and bactrim  Complications DGF requiring HD    2. Left foot ulcer and cellulitis  3. Right 3d toe necrotic ulcer  4. History of first ray amputation 8/2021 - tissue cx grew E faecalis, Corynebacterium prevotella spp, Lecrercia spp   CXR with concern for OM although not in areas of concern on exam  Vascular studies with small vessel disease.     Left ulcer culture sent ? debrided tossue: Klebsiella pneumoniae, PsA and E faecalis  zosyn/vanc 12/6-    Recommendations  ·	MRi of both feet with OM of the left first metatarsal head and proximal phalynx of first toe and thrid right toe tip  ·	plan is to perform MRA with vascular  ·	Plan also for amputation of R thrid distal toe on 12/11 and debridement of LEft bone tissue  ·	Please send MARGIN LEFT bone tissue bacterial cx, MARGIn histopath from R toe and MARGIN bacterial cultures . Suspect will still need 6 weeks of antibiotics given no plans to amputate L metatarsal/proximal phalanx  ·	ESR, crp weekly  ·	Meanwhile, cultuures from ulcer superficial debridement with ESBl Klebsiella, PSA and E faecalis.      Unless surgical operative cultures grow additional organisms will plan to treat with IV imipenem x 6weeks total therapy  Patient will need PICC line placement and home care services arranged by primary team    He can follow up in the ID office after discharge    Plan discussed with patient    Jackson Moore MD  Can be called via Teams  After 5pm/weekends [phone]

## 2023-12-12 NOTE — PROGRESS NOTE ADULT - ASSESSMENT
53M with PMH of HTN, HLD, DM2, CAD s/p CABG, HF s/p AICD, ESRD on HD since 2016 s/p DDRT (7/14/23) complicated by DGF, and toe amputations due to PVD/osteo, now presenting to the ED with concern for toe infection. Nephrology service consulted for management of immunosuppression of transplanted kidney.       1. 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   - Scr increased from 1.3 to 1.54    2. Immunosuppression medications  - Initially received Simulect induction but changed to Thymoglobulin given delayed graft function.  - On MMF, Envarsus, and Prednisone 10mg (+DSA on 7/15/23) at home  - Recommend holding cellcept at this time due to osteo  - Tacro level elevated at 6.6 today (last dose on 12/10 Envarsus 2mg)  - Check serum tacrolimus level (trough) 30 mins prior to AM dose.  - Ppx with Valcyte (currently not being given) and Bactrim    3. Osteomyelitis - managed by Podiatry, Vascular, and Transplant ID. Pt underwent Right foot partial 3rd ray resection and left foot wound debridement on 12/11. Will continue on abx for ~6 weeks as per Transplant ID.  4. HTN - acceptable on Coreg 3.125mg BID  5. DM2- On lantus and premeal insulin      If you have any questions, please feel free to contact me.  Stuart Franklin MD  Nephrology Fellow  [id number] / [employer] Teams (Preferred)  (After 4pm or on weekends, please call the on-call Fellow) 53M with PMH of HTN, HLD, DM2, CAD s/p CABG, HF s/p AICD, ESRD on HD since 2016 s/p DDRT (7/14/23) complicated by DGF, and toe amputations due to PVD/osteo, now presenting to the ED with concern for toe infection. Nephrology service consulted for management of immunosuppression of transplanted kidney.       1. 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   - Scr increased from 1.3 to 1.54    2. Immunosuppression medications  - Initially received Simulect induction but changed to Thymoglobulin given delayed graft function.  - On MMF, Envarsus, and Prednisone 10mg (+DSA on 7/15/23) at home  - Recommend holding cellcept at this time due to osteo  - Tacro level elevated at 6.6 today (last dose on 12/10 Envarsus 2mg)  - Check serum tacrolimus level (trough) 30 mins prior to AM dose.  - Ppx with Valcyte (currently not being given) and Bactrim    3. Osteomyelitis - managed by Podiatry, Vascular, and Transplant ID. Pt underwent Right foot partial 3rd ray resection and left foot wound debridement on 12/11. Will continue on abx for ~6 weeks as per Transplant ID.  4. HTN - acceptable on Coreg 3.125mg BID  5. DM2- On lantus and premeal insulin      If you have any questions, please feel free to contact me.  Stuart Franklin MD  Nephrology Fellow  [id number] / [employer] Teams (Preferred)  (After 4pm or on weekends, please call the on-call Fellow) 53M with PMH of HTN, HLD, DM2, CAD s/p CABG, HF s/p AICD, ESRD on HD since 2016 s/p DDRT (7/14/23) complicated by DGF, and toe amputations due to PVD/osteo, now presenting to the ED with concern for toe infection. Nephrology service consulted for management of immunosuppression of transplanted kidney.       1. 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   - Scr increased from 1.3 to 1.54    2. Immunosuppression medications  - Initially received Simulect induction but changed to Thymoglobulin given delayed graft function.  - On MMF, Envarsus, and Prednisone 10mg (+DSA on 7/15/23) at home  - Recommend holding cellcept at this time due to osteo  - Tacro level elevated at 6.6 today (last dose on 12/10 Envarsus 2mg)  - Check serum tacrolimus level (trough) 30 mins prior to AM dose.  - Ppx with Bactrim    3. Osteomyelitis - managed by Podiatry, Vascular, and Transplant ID. Pt underwent Right foot partial 3rd ray resection and left foot wound debridement on 12/11. Will continue on abx for ~6 weeks as per Transplant ID.  4. HTN - acceptable on Coreg 3.125mg BID  5. DM2- On lantus and premeal insulin      If you have any questions, please feel free to contact me.  Stuart Franklin MD  Nephrology Fellow  [id number] / [employer] Teams (Preferred)  (After 4pm or on weekends, please call the on-call Fellow) 53M with PMH of HTN, HLD, DM2, CAD s/p CABG, HF s/p AICD, ESRD on HD since 2016 s/p DDRT (7/14/23) complicated by DGF, and toe amputations due to PVD/osteo, now presenting to the ED with concern for toe infection. Nephrology service consulted for management of immunosuppression of transplanted kidney.       1. 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   - Scr increased from 1.3 to 1.54    2. Immunosuppression medications  - Initially received Simulect induction but changed to Thymoglobulin given delayed graft function.  - On MMF, Envarsus, and Prednisone 10mg (+DSA on 7/15/23) at home  - Recommend holding cellcept at this time due to osteo  - Tacro level elevated at 6.6 today (last dose on 12/10 Envarsus 2mg)  - Check serum tacrolimus level (trough) 30 mins prior to AM dose.  - Ppx with Bactrim    3. Osteomyelitis - managed by Podiatry, Vascular, and Transplant ID. Pt underwent Right foot partial 3rd ray resection and left foot wound debridement on 12/11. Will continue on abx for ~6 weeks as per Transplant ID.  4. HTN - acceptable on Coreg 3.125mg BID  5. DM2- On lantus and premeal insulin      If you have any questions, please feel free to contact me.  Stuart Franklin MD  Nephrology Fellow  [id number] / [employer] Teams (Preferred)  (After 4pm or on weekends, please call the on-call Fellow)

## 2023-12-12 NOTE — PROGRESS NOTE ADULT - SUBJECTIVE AND OBJECTIVE BOX
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 (Unknown)    Intolerances    Standing Inpatient Medications  aspirin enteric coated 81 milliGRAM(s) Oral daily  atorvastatin 40 milliGRAM(s) Oral at bedtime  carvedilol 3.125 milliGRAM(s) Oral every 12 hours  famotidine    Tablet 20 milliGRAM(s) Oral daily  imipenem/cilastatin  IVPB      imipenem/cilastatin  IVPB 500 milliGRAM(s) IV Intermittent every 6 hours  insulin glargine Injectable (LANTUS) 10 Unit(s) SubCutaneous at bedtime  insulin lispro (ADMELOG) corrective regimen sliding scale   SubCutaneous three times a day before meals  insulin lispro Injectable (ADMELOG) 10 Unit(s) SubCutaneous three times a day with meals  predniSONE   Tablet 10 milliGRAM(s) Oral daily  trimethoprim   80 mG/sulfamethoxazole 400 mG 1 Tablet(s) Oral daily    PRN Inpatient Medications  acetaminophen     Tablet .. 650 milliGRAM(s) Oral every 6 hours PRN  oxycodone    5 mG/acetaminophen 325 mG 1 Tablet(s) Oral every 4 hours PRN    REVIEW OF SYSTEMS  --------------------------------------------------------------------------------  Gen: No fevers/chills  Respiratory: No dyspnea, cough,   CV: No chest pain, PND, orthopnea  GI: No abdominal pain, diarrhea, constipation, nausea, vomiting  Transplant: No pain  : No increased frequency, dysuria, hematuria   MSK: No edema  Neuro: No dizziness/lightheadedness    All other systems were reviewed and are negative, except as noted.    VITALS/PHYSICAL EXAM  --------------------------------------------------------------------------------  T(C): 37.4 (12-12-23 @ 13:31), Max: 37.4 (12-12-23 @ 13:31)  HR: 86 (12-12-23 @ 13:31) (69 - 86)  BP: 102/64 (12-12-23 @ 13:31) (102/64 - 148/74)  RR: 18 (12-12-23 @ 13:31) (18 - 18)  SpO2: 100% (12-12-23 @ 13:31) (97% - 100%)  Wt(kg): --  Height (cm): 175.3 (12-11-23 @ 13:32)  Weight (kg): 89.4 (12-11-23 @ 13:32)  BMI (kg/m2): 29.1 (12-11-23 @ 13:32)  BSA (m2): 2.05 (12-11-23 @ 13:32)    12-11-23 @ 07:01 - 12-12-23 @ 07:00  --------------------------------------------------------  IN: 240 mL / OUT: 1550 mL / NET: -1310 mL    12-12-23 @ 07:01  -  12-12-23 @ 16:50  --------------------------------------------------------  IN: 350 mL / OUT: 0 mL / NET: 350 mL    Physical Exam:  Gen: NAD  HEENT: Anicteric  Pulm: CTA B/L  CV: RRR, no murmur appreciated  Abd: +BS, soft and non distended abdomen. No tenderness to palpation. Transplant non tender, no bruit  Extremities: No edema, toe amputations, dressing on B/L LE  Skin: warm, stasis dermatitis  Neuro: Awake and alert    LABS/STUDIES  --------------------------------------------------------------------------------              10.0   3.18  >-----------<  198      [12-12-23 @ 07:04]              31.7     140  |  106  |  23  ----------------------------<  172      [12-12-23 @ 07:00]  4.6   |  25  |  1.54        Ca     10.0     [12-12-23 @ 07:00]      Mg     1.9     [12-12-23 @ 07:00]      Phos  2.7     [12-12-23 @ 07:00]    PT/INR: PT 13.4 , INR 1.29       [12-11-23 @ 06:24]  PTT: 37.1       [12-11-23 @ 06:24]    Creatinine Trend:  SCr 1.54 [12-12 @ 07:00]  SCr 1.30 [12-11 @ 06:24]  SCr 1.32 [12-10 @ 06:30]  SCr 1.21 [12-09 @ 05:45]  SCr 1.15 [12-08 @ 07:17]    Tacrolimus (), Serum: 6.6 ng/mL (12-12 @ 07:06)  Tacrolimus (), Serum: 12.0 ng/mL (12-11 @ 06:24)  Tacrolimus (), Serum: 12.1 ng/mL (12-10 @ 06:30)  Tacrolimus (), Serum: 19.4 ng/mL (12-09 @ 05:46)    Urinalysis - [12-12-23 @ 07:00]      Color  / Appearance  / SG  / pH       Gluc 172 / Ketone   / Bili  / Urobili        Blood  / Protein  / Leuk Est  / Nitrite       RBC  / WBC  / Hyaline  / Gran  / Sq Epi  / Non Sq Epi  / Bacteria     Iron 68, TIBC 185, %sat 37      [08-16-23 @ 06:39]  Ferritin 1010      [08-16-23 @ 06:39]  PTH -- (Ca 10.6)      [11-02-23 @ 11:56]   220  Vitamin D (25OH) 18.0      [11-02-23 @ 11:56] [facility] DIVISION OF KIDNEY DISEASES AND HYPERTENSION -- FOLLOW UP NOTE  --------------------------------------------------------------------------------    24 hour events/subjective:  -No acute events overnight  -Pt with osteomyelitis of toes   -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 (Unknown)    Intolerances    Standing Inpatient Medications  aspirin enteric coated 81 milliGRAM(s) Oral daily  atorvastatin 40 milliGRAM(s) Oral at bedtime  carvedilol 3.125 milliGRAM(s) Oral every 12 hours  famotidine    Tablet 20 milliGRAM(s) Oral daily  imipenem/cilastatin  IVPB      imipenem/cilastatin  IVPB 500 milliGRAM(s) IV Intermittent every 6 hours  insulin glargine Injectable (LANTUS) 10 Unit(s) SubCutaneous at bedtime  insulin lispro (ADMELOG) corrective regimen sliding scale   SubCutaneous three times a day before meals  insulin lispro Injectable (ADMELOG) 10 Unit(s) SubCutaneous three times a day with meals  predniSONE   Tablet 10 milliGRAM(s) Oral daily  trimethoprim   80 mG/sulfamethoxazole 400 mG 1 Tablet(s) Oral daily    PRN Inpatient Medications  acetaminophen     Tablet .. 650 milliGRAM(s) Oral every 6 hours PRN  oxycodone    5 mG/acetaminophen 325 mG 1 Tablet(s) Oral every 4 hours PRN    REVIEW OF SYSTEMS  --------------------------------------------------------------------------------  Gen: No fevers/chills  Respiratory: No dyspnea, cough,   CV: No chest pain, PND, orthopnea  GI: No abdominal pain, diarrhea, constipation, nausea, vomiting  Transplant: No pain  : No increased frequency, dysuria, hematuria   MSK: No edema  Neuro: No dizziness/lightheadedness    All other systems were reviewed and are negative, except as noted.    VITALS/PHYSICAL EXAM  --------------------------------------------------------------------------------  T(C): 37.4 (12-12-23 @ 13:31), Max: 37.4 (12-12-23 @ 13:31)  HR: 86 (12-12-23 @ 13:31) (69 - 86)  BP: 102/64 (12-12-23 @ 13:31) (102/64 - 148/74)  RR: 18 (12-12-23 @ 13:31) (18 - 18)  SpO2: 100% (12-12-23 @ 13:31) (97% - 100%)  Wt(kg): --  Height (cm): 175.3 (12-11-23 @ 13:32)  Weight (kg): 89.4 (12-11-23 @ 13:32)  BMI (kg/m2): 29.1 (12-11-23 @ 13:32)  BSA (m2): 2.05 (12-11-23 @ 13:32)    12-11-23 @ 07:01 - 12-12-23 @ 07:00  --------------------------------------------------------  IN: 240 mL / OUT: 1550 mL / NET: -1310 mL    12-12-23 @ 07:01  -  12-12-23 @ 16:50  --------------------------------------------------------  IN: 350 mL / OUT: 0 mL / NET: 350 mL    Physical Exam:  Gen: NAD  HEENT: Anicteric  Pulm: CTA B/L  CV: RRR, no murmur appreciated  Abd: +BS, soft and non distended abdomen. No tenderness to palpation. Transplant non tender, no bruit  Extremities: No edema, toe amputations, dressing on B/L LE  Skin: warm, stasis dermatitis  Neuro: Awake and alert    LABS/STUDIES  --------------------------------------------------------------------------------              10.0   3.18  >-----------<  198      [12-12-23 @ 07:04]              31.7     140  |  106  |  23  ----------------------------<  172      [12-12-23 @ 07:00]  4.6   |  25  |  1.54        Ca     10.0     [12-12-23 @ 07:00]      Mg     1.9     [12-12-23 @ 07:00]      Phos  2.7     [12-12-23 @ 07:00]    PT/INR: PT 13.4 , INR 1.29       [12-11-23 @ 06:24]  PTT: 37.1       [12-11-23 @ 06:24]    Creatinine Trend:  SCr 1.54 [12-12 @ 07:00]  SCr 1.30 [12-11 @ 06:24]  SCr 1.32 [12-10 @ 06:30]  SCr 1.21 [12-09 @ 05:45]  SCr 1.15 [12-08 @ 07:17]    Tacrolimus (), Serum: 6.6 ng/mL (12-12 @ 07:06)  Tacrolimus (), Serum: 12.0 ng/mL (12-11 @ 06:24)  Tacrolimus (), Serum: 12.1 ng/mL (12-10 @ 06:30)  Tacrolimus (), Serum: 19.4 ng/mL (12-09 @ 05:46)    Urinalysis - [12-12-23 @ 07:00]      Color  / Appearance  / SG  / pH       Gluc 172 / Ketone   / Bili  / Urobili        Blood  / Protein  / Leuk Est  / Nitrite       RBC  / WBC  / Hyaline  / Gran  / Sq Epi  / Non Sq Epi  / Bacteria     Iron 68, TIBC 185, %sat 37      [08-16-23 @ 06:39]  Ferritin 1010      [08-16-23 @ 06:39]  PTH -- (Ca 10.6)      [11-02-23 @ 11:56]   220  Vitamin D (25OH) 18.0      [11-02-23 @ 11:56]

## 2023-12-12 NOTE — PROGRESS NOTE ADULT - ASSESSMENT
53M w s/p R foot partial 3rd ray resection  - Patient seen and evaluated  - Afebrile, no leukocytosis  - 12/11 s/p RF partial 3rd ray resection: sutures intact, no dehiscence, no periwound erythema, no hematoma. 12/11 s/p LF wound debridement with kerecis graft applicaiton: sutures and graft intact, no drainage, no periwound erythema, no hematoma.  - OR findings: low concern for residual bone or soft tissue infection, low concern for viability  - OR data: pending  - ID recommendations, appreciated   - Vascular recommendations appreciated - planning possible MRA  - AICD interrogation on 12/8, appreciated  -Patient is stable from podiatry standpoint pending clean bone margins no growth x 48hrs  - Discussed with attending

## 2023-12-12 NOTE — PROGRESS NOTE ADULT - SUBJECTIVE AND OBJECTIVE BOX
Patient is a 53y old  Male who presents with a chief complaint of c/f osteomyelitis (12 Dec 2023 08:30)       INTERVAL HPI/OVERNIGHT EVENTS:  Patient seen and evaluated at bedside.  Pt is resting comfortable in NAD. Denies N/V/F/C.    Allergies    Contrast. (Unknown)    Intolerances        Vital Signs Last 24 Hrs  T(C): 36.5 (12 Dec 2023 08:17), Max: 36.8 (11 Dec 2023 21:00)  T(F): 97.7 (12 Dec 2023 08:17), Max: 98.2 (11 Dec 2023 21:00)  HR: 73 (12 Dec 2023 08:17) (48 - 78)  BP: 146/66 (12 Dec 2023 08:17) (104/49 - 148/74)  BP(mean): 94 (11 Dec 2023 17:00) (71 - 94)  RR: 18 (12 Dec 2023 08:17) (15 - 18)  SpO2: 97% (12 Dec 2023 08:17) (97% - 100%)    Parameters below as of 12 Dec 2023 08:17  Patient On (Oxygen Delivery Method): room air        LABS:                        10.0   3.18  )-----------( 198      ( 12 Dec 2023 07:04 )             31.7     12-12    140  |  106  |  23  ----------------------------<  172<H>  4.6   |  25  |  1.54<H>    Ca    10.0      12 Dec 2023 07:00  Phos  2.7     12-12  Mg     1.9     12-12      PT/INR - ( 11 Dec 2023 06:24 )   PT: 13.4 sec;   INR: 1.29 ratio         PTT - ( 11 Dec 2023 06:24 )  PTT:37.1 sec  Urinalysis Basic - ( 12 Dec 2023 07:00 )    Color: x / Appearance: x / SG: x / pH: x  Gluc: 172 mg/dL / Ketone: x  / Bili: x / Urobili: x   Blood: x / Protein: x / Nitrite: x   Leuk Esterase: x / RBC: x / WBC x   Sq Epi: x / Non Sq Epi: x / Bacteria: x      CAPILLARY BLOOD GLUCOSE      POCT Blood Glucose.: 183 mg/dL (12 Dec 2023 09:48)  POCT Blood Glucose.: 171 mg/dL (12 Dec 2023 08:15)  POCT Blood Glucose.: 225 mg/dL (11 Dec 2023 21:21)  POCT Blood Glucose.: 140 mg/dL (11 Dec 2023 17:29)  POCT Blood Glucose.: 168 mg/dL (11 Dec 2023 15:36)  POCT Blood Glucose.: 165 mg/dL (11 Dec 2023 12:11)      Lower Extremity Physical Exam:  Vascular: DP/PT 0/4 B/L, CFT <3 seconds B/L, Temperature gradient warm to cool b/l  Neuro: Epicritic sensation absent to the level of digits B/L  Musculoskeletal/Ortho: s/p right foot partial 1st ray resection, s/p right foot partial 2nd toe amputation, s/p left foot 5th MPJ resection  Skin: 12/11 s/p R foot partial 3rd ray resection: sutures intact, no dehiscence, no periwound erythema, no hematoma. 12/11 s/p L foot wound debridement with kerecis graft applicaiton: sutures and graft intact, no drainage, no periwound erythema, no hematoma.    RADIOLOGY & ADDITIONAL TESTS:

## 2023-12-12 NOTE — PROGRESS NOTE ADULT - SUBJECTIVE AND OBJECTIVE BOX
Vascular Surgery Progress Note  Patient is a 53y old  Male who presents with a chief complaint of c/f osteomyelitis (12 Dec 2023 10:03)      INTERVAL EVENTS: No acute events overnight.  SUBJECTIVE: Patient seen and examined at bedside with surgical team, patient without complaints. Denies fever, chills, CP, SOB nausea, vomiting, abdominal pain.        OBJECTIVE:    Vital Signs Last 24 Hrs  T(C): 36.5 (12 Dec 2023 08:17), Max: 36.8 (11 Dec 2023 21:00)  T(F): 97.7 (12 Dec 2023 08:17), Max: 98.2 (11 Dec 2023 21:00)  HR: 73 (12 Dec 2023 08:17) (48 - 78)  BP: 146/66 (12 Dec 2023 08:17) (104/49 - 148/74)  BP(mean): 94 (11 Dec 2023 17:00) (71 - 94)  RR: 18 (12 Dec 2023 08:17) (15 - 18)  SpO2: 97% (12 Dec 2023 08:17) (97% - 100%)    Parameters below as of 12 Dec 2023 08:17  Patient On (Oxygen Delivery Method): room air    I&O's Detail    11 Dec 2023 07:01  -  12 Dec 2023 07:00  --------------------------------------------------------  IN:    Oral Fluid: 240 mL  Total IN: 240 mL    OUT:    Voided (mL): 1550 mL  Total OUT: 1550 mL    Total NET: -1310 mL      MEDICATIONS  (STANDING):  aspirin enteric coated 81 milliGRAM(s) Oral daily  atorvastatin 40 milliGRAM(s) Oral at bedtime  carvedilol 3.125 milliGRAM(s) Oral every 12 hours  famotidine    Tablet 20 milliGRAM(s) Oral daily  imipenem/cilastatin  IVPB      imipenem/cilastatin  IVPB 500 milliGRAM(s) IV Intermittent every 6 hours  insulin glargine Injectable (LANTUS) 10 Unit(s) SubCutaneous at bedtime  insulin lispro (ADMELOG) corrective regimen sliding scale   SubCutaneous three times a day before meals  insulin lispro Injectable (ADMELOG) 10 Unit(s) SubCutaneous three times a day with meals  predniSONE   Tablet 10 milliGRAM(s) Oral daily  trimethoprim   80 mG/sulfamethoxazole 400 mG 1 Tablet(s) Oral daily    MEDICATIONS  (PRN):  acetaminophen     Tablet .. 650 milliGRAM(s) Oral every 6 hours PRN Mild Pain (1 - 3)  oxycodone    5 mG/acetaminophen 325 mG 1 Tablet(s) Oral every 4 hours PRN Moderate Pain (4 - 6)      PHYSICAL EXAM:    General Appearance: Appears well, NAD  Neck: Supple  Chest: Equal expansion bilaterally  CV: Pulse regular presently  Abdomen: Soft, nontense  Extremities: -LLE: 1st metatarsal ulcer, +DP/PT signals, warm, motor/ sensory intact   -RLE: 3rd toe wound with dry gangrene, +DP/ PT signals, warm, motor/ sensory intact   left foot dorsum wound stable    LABS:                        10.0   3.18  )-----------( 198      ( 12 Dec 2023 07:04 )             31.7     12-12    140  |  106  |  23  ----------------------------<  172<H>  4.6   |  25  |  1.54<H>    Ca    10.0      12 Dec 2023 07:00  Phos  2.7     12-12  Mg     1.9     12-12      PT/INR - ( 11 Dec 2023 06:24 )   PT: 13.4 sec;   INR: 1.29 ratio         PTT - ( 11 Dec 2023 06:24 )  PTT:37.1 sec    Urinalysis Basic - ( 12 Dec 2023 07:00 )    Color: x / Appearance: x / SG: x / pH: x  Gluc: 172 mg/dL / Ketone: x  / Bili: x / Urobili: x   Blood: x / Protein: x / Nitrite: x   Leuk Esterase: x / RBC: x / WBC x   Sq Epi: x / Non Sq Epi: x / Bacteria: x          IMAGING:

## 2023-12-12 NOTE — PHYSICAL THERAPY INITIAL EVALUATION ADULT - PERTINENT HX OF CURRENT PROBLEM, REHAB EVAL
52 y/o M with past medical history significant for IDDM, CAD s/p CABG x4v (2015) s/p AICD (2016), PVD (4 stents in RLE), HTN, HLD and ESRD previously on HD via LUE AVF (ligated 9/8/23) now s/p DDRT 7/16/2023 with Simulect -> Thymoglobulin induction. His post-transplant course was complicated by DGF and subtly [facility] with +DSAs s/p PLEX/IVIG (8/2023) and L subclavian thrombus remains on Eliquis presents to [facility] ED from wound care clinic on 12/6/23 after concern for osteomyelitis from clinic providers. Per patient, he has been following with wound clinic. Reports he had a blister on his L anterior foot that had popped and scabbed over. Also reports hearing a "pop" with an inability to walk on his L leg, and attributes this with why he is now ambulating with a cane. He also reports "the doc said the Right foot is worse" reporting during exam today they were able to "touch the bone".  s/p right foot partial 1st ray resection, s/p right foot partial 2nd toe amputation, s/p left foot 5th MPJ resection 52 y/o M with past medical history significant for IDDM, CAD s/p CABG x4v (2015) s/p AICD (2016), PVD (4 stents in RLE), HTN, HLD and ESRD previously on HD via LUE AVF (ligated 9/8/23) now s/p DDRT 7/16/2023 with Simulect -> Thymoglobulin induction. His post-transplant course was complicated by DGF and subtly [facility] with +DSAs s/p PLEX/IVIG (8/2023) and L subclavian thrombus remains on Eliquis presents to [facility] ED from wound care clinic on 12/6/23 after concern for osteomyelitis from clinic providers. Per patient, he has been following with wound clinic. Reports he had a blister on his L anterior foot that had popped and scabbed over. Also reports hearing a "pop" with an inability to walk on his L leg, and attributes this with why he is now ambulating with a cane. He also reports "the doc said the Right foot is worse" reporting during exam today they were able to "touch the bone".  s/p right foot partial 1st ray resection, s/p right foot partial 2nd toe amputation, s/p left foot 5th MPJ resection

## 2023-12-12 NOTE — PROGRESS NOTE ADULT - SUBJECTIVE AND OBJECTIVE BOX
Transplant Surgery - Multidisciplinary Progress Note  --------------------------------------------------------------  DDRT 7/16/2023    Present: Patient seen and examined with multidisciplinary Transplant team including  Surgeon: Dr. Vizcaino, Nephrologist: Dr. Aguilar   Pharmacist: Mary Trejo and unit RN during am rounds.  Disciplines not in attendance will be notified of the plan.     HPI:  52 y/o M with past medical history significant for HTN, CHF (s/p AICD 2016--last interrogated 7/15/23), CAD (s/p CABG 2015), IDDM, PVD s/p stent x4 in RLE with R big toe/second toe amputation (2021) and ESRD on HD via LUE AVF for 6 years now s/p DDRT 7/16/2023 with Thymoglobulin induction. Post-transplant course complicated by severe constipation requiring disimpaction, DGF requiring hemodialysis and LUE swelling with concern for L subclavian thrombus on ELiquius, now s/p IR fistulogram 7/7/23 with balloon angioplasty of subclavian vein. He was discharged home on 7/27/23     Presented to [facility] ED from wound care clinic on 12/6/23 after concern for osteomyelitis from clinic providers.     Interval Events:  - Afebrile, VSS  - OR w/podiatry yesterday: R partial 3rd toe amp, R foot I&D of necrotic tissue, L foot application of kerecis graft.     Immunosuppression:  Maintenance Immuno: Envarsus by level, MMF held, Pred 10    Potential Discharge date: TBD  Plan of care: see below          MEDICATIONS  (STANDING):  aspirin enteric coated 81 milliGRAM(s) Oral daily  atorvastatin 40 milliGRAM(s) Oral at bedtime  carvedilol 3.125 milliGRAM(s) Oral every 12 hours  famotidine    Tablet 20 milliGRAM(s) Oral daily  imipenem/cilastatin  IVPB      imipenem/cilastatin  IVPB 500 milliGRAM(s) IV Intermittent every 6 hours  insulin glargine Injectable (LANTUS) 10 Unit(s) SubCutaneous at bedtime  insulin lispro (ADMELOG) corrective regimen sliding scale   SubCutaneous three times a day before meals  insulin lispro Injectable (ADMELOG) 10 Unit(s) SubCutaneous three times a day with meals  predniSONE   Tablet 10 milliGRAM(s) Oral daily  trimethoprim   80 mG/sulfamethoxazole 400 mG 1 Tablet(s) Oral daily    MEDICATIONS  (PRN):  acetaminophen     Tablet .. 650 milliGRAM(s) Oral every 6 hours PRN Mild Pain (1 - 3)  oxycodone    5 mG/acetaminophen 325 mG 1 Tablet(s) Oral every 4 hours PRN Moderate Pain (4 - 6)      PAST MEDICAL & SURGICAL HISTORY:  Diabetes mellitus  type 2      Foot ulcer due to secondary DM      Coronary artery disease      Acute on chronic systolic heart failure      H/O kidney transplant      Breast Reduction  at age 18      Toe amputation status, left      S/P CABG (coronary artery bypass graft)      AICD (automatic cardioverter/defibrillator) present          Vital Signs Last 24 Hrs  T(C): 36.7 (12 Dec 2023 17:00), Max: 37.4 (12 Dec 2023 13:31)  T(F): 98 (12 Dec 2023 17:00), Max: 99.3 (12 Dec 2023 13:31)  HR: 74 (12 Dec 2023 17:00) (69 - 86)  BP: 131/76 (12 Dec 2023 17:00) (102/64 - 148/74)  BP(mean): --  RR: 18 (12 Dec 2023 17:00) (18 - 18)  SpO2: 99% (12 Dec 2023 17:00) (97% - 100%)    Parameters below as of 12 Dec 2023 17:00  Patient On (Oxygen Delivery Method): room air        I&O's Summary    11 Dec 2023 07:01  -  12 Dec 2023 07:00  --------------------------------------------------------  IN: 240 mL / OUT: 1550 mL / NET: -1310 mL    12 Dec 2023 07:01  -  12 Dec 2023 19:55  --------------------------------------------------------  IN: 550 mL / OUT: 0 mL / NET: 550 mL                              10.0   3.18  )-----------( 198      ( 12 Dec 2023 07:04 )             31.7     12-12    140  |  106  |  23  ----------------------------<  172<H>  4.6   |  25  |  1.54<H>    Ca    10.0      12 Dec 2023 07:00  Phos  2.7     12-12  Mg     1.9     12-12      Tacrolimus (), Serum: 6.6 ng/mL (12-12 @ 07:06)        Culture - Tissue with Gram Stain (collected 12-11-23 @ 21:16)  Source: .Tissue Other  Gram Stain (12-12-23 @ 01:58):    Numerous polymorphonuclear leukocytes seen per low power field    No organisms seen per oil power field    Culture - Tissue with Gram Stain (collected 12-11-23 @ 21:16)  Source: .Tissue Other  Gram Stain (12-12-23 @ 01:40):    No polymorphonuclear cells seen per low power field    Rare Gram Variable Coccobacilli seen per oil power field    Culture - Abscess with Gram Stain (collected 12-06-23 @ 16:59)  Source: .Abscess left foot  Final Report (12-11-23 @ 17:00):    Numerous Klebsiella pneumoniae ESBL    Numerous Pseudomonas aeruginosa    Numerous Enterococcus faecalis    Normal skin linsey isolated  Organism: Klebsiella pneumoniae ESBL  Pseudomonas aeruginosa  Enterococcus faecalis (12-11-23 @ 17:00)  Organism: Enterococcus faecalis (12-11-23 @ 17:00)  Organism: Enterococcus faecalis (12-11-23 @ 17:00)  Organism: Pseudomonas aeruginosa (12-11-23 @ 17:00)  Organism: Klebsiella pneumoniae ESBL (12-11-23 @ 17:00)    Culture - Blood (collected 12-06-23 @ 13:08)  Source: .Blood Blood  Final Report (12-11-23 @ 18:01):    No growth at 5 days    Culture - Blood (collected 12-06-23 @ 12:40)  Source: .Blood Blood  Final Report (12-11-23 @ 18:01):    No growth at 5 days            Review of systems  All other systems were reviewed and are negative, except as noted.     PHYSICAL EXAM:  Constitutional: Well developed / well nourished  Eyes: Anicteric, PERRLA  ENMT: nc/at  Neck: supple  Respiratory: CTA B/L  Cardiovascular: RRR  Gastrointestinal: Soft, ND/NT   Genitourinary: Voiding spontaneously  Extremities: SCD's in place and working bilaterally  Vascular: Palpable dp pulses bilaterally, b/l LE foot wound dressing in place  Neurological: A&O x3  Skin: no rashes, ulcerations or lesions other than above   Musculoskeletal: Moving all extremities  Psychiatric: Responsive Transplant Surgery - Multidisciplinary Progress Note  --------------------------------------------------------------  DDRT 7/16/2023    Present: Patient seen and examined with multidisciplinary Transplant team including  Surgeon: Dr. Vizcaino, Nephrologist: Dr. Aguilar   Pharmacist: Mary Trejo and unit RN during am rounds.  Disciplines not in attendance will be notified of the plan.     HPI:  54 y/o M with past medical history significant for HTN, CHF (s/p AICD 2016--last interrogated 7/15/23), CAD (s/p CABG 2015), IDDM, PVD s/p stent x4 in RLE with R big toe/second toe amputation (2021) and ESRD on HD via LUE AVF for 6 years now s/p DDRT 7/16/2023 with Thymoglobulin induction. Post-transplant course complicated by severe constipation requiring disimpaction, DGF requiring hemodialysis and LUE swelling with concern for L subclavian thrombus on ELiquius, now s/p IR fistulogram 7/7/23 with balloon angioplasty of subclavian vein. He was discharged home on 7/27/23     Presented to [facility] ED from wound care clinic on 12/6/23 after concern for osteomyelitis from clinic providers.     Interval Events:  - Afebrile, VSS  - OR w/podiatry yesterday: R partial 3rd toe amp, R foot I&D of necrotic tissue, L foot application of kerecis graft.     Immunosuppression:  Maintenance Immuno: Envarsus by level, MMF held, Pred 10    Potential Discharge date: TBD  Plan of care: see below          MEDICATIONS  (STANDING):  aspirin enteric coated 81 milliGRAM(s) Oral daily  atorvastatin 40 milliGRAM(s) Oral at bedtime  carvedilol 3.125 milliGRAM(s) Oral every 12 hours  famotidine    Tablet 20 milliGRAM(s) Oral daily  imipenem/cilastatin  IVPB      imipenem/cilastatin  IVPB 500 milliGRAM(s) IV Intermittent every 6 hours  insulin glargine Injectable (LANTUS) 10 Unit(s) SubCutaneous at bedtime  insulin lispro (ADMELOG) corrective regimen sliding scale   SubCutaneous three times a day before meals  insulin lispro Injectable (ADMELOG) 10 Unit(s) SubCutaneous three times a day with meals  predniSONE   Tablet 10 milliGRAM(s) Oral daily  trimethoprim   80 mG/sulfamethoxazole 400 mG 1 Tablet(s) Oral daily    MEDICATIONS  (PRN):  acetaminophen     Tablet .. 650 milliGRAM(s) Oral every 6 hours PRN Mild Pain (1 - 3)  oxycodone    5 mG/acetaminophen 325 mG 1 Tablet(s) Oral every 4 hours PRN Moderate Pain (4 - 6)      PAST MEDICAL & SURGICAL HISTORY:  Diabetes mellitus  type 2      Foot ulcer due to secondary DM      Coronary artery disease      Acute on chronic systolic heart failure      H/O kidney transplant      Breast Reduction  at age 18      Toe amputation status, left      S/P CABG (coronary artery bypass graft)      AICD (automatic cardioverter/defibrillator) present          Vital Signs Last 24 Hrs  T(C): 36.7 (12 Dec 2023 17:00), Max: 37.4 (12 Dec 2023 13:31)  T(F): 98 (12 Dec 2023 17:00), Max: 99.3 (12 Dec 2023 13:31)  HR: 74 (12 Dec 2023 17:00) (69 - 86)  BP: 131/76 (12 Dec 2023 17:00) (102/64 - 148/74)  BP(mean): --  RR: 18 (12 Dec 2023 17:00) (18 - 18)  SpO2: 99% (12 Dec 2023 17:00) (97% - 100%)    Parameters below as of 12 Dec 2023 17:00  Patient On (Oxygen Delivery Method): room air        I&O's Summary    11 Dec 2023 07:01  -  12 Dec 2023 07:00  --------------------------------------------------------  IN: 240 mL / OUT: 1550 mL / NET: -1310 mL    12 Dec 2023 07:01  -  12 Dec 2023 19:55  --------------------------------------------------------  IN: 550 mL / OUT: 0 mL / NET: 550 mL                              10.0   3.18  )-----------( 198      ( 12 Dec 2023 07:04 )             31.7     12-12    140  |  106  |  23  ----------------------------<  172<H>  4.6   |  25  |  1.54<H>    Ca    10.0      12 Dec 2023 07:00  Phos  2.7     12-12  Mg     1.9     12-12      Tacrolimus (), Serum: 6.6 ng/mL (12-12 @ 07:06)        Culture - Tissue with Gram Stain (collected 12-11-23 @ 21:16)  Source: .Tissue Other  Gram Stain (12-12-23 @ 01:58):    Numerous polymorphonuclear leukocytes seen per low power field    No organisms seen per oil power field    Culture - Tissue with Gram Stain (collected 12-11-23 @ 21:16)  Source: .Tissue Other  Gram Stain (12-12-23 @ 01:40):    No polymorphonuclear cells seen per low power field    Rare Gram Variable Coccobacilli seen per oil power field    Culture - Abscess with Gram Stain (collected 12-06-23 @ 16:59)  Source: .Abscess left foot  Final Report (12-11-23 @ 17:00):    Numerous Klebsiella pneumoniae ESBL    Numerous Pseudomonas aeruginosa    Numerous Enterococcus faecalis    Normal skin linsey isolated  Organism: Klebsiella pneumoniae ESBL  Pseudomonas aeruginosa  Enterococcus faecalis (12-11-23 @ 17:00)  Organism: Enterococcus faecalis (12-11-23 @ 17:00)  Organism: Enterococcus faecalis (12-11-23 @ 17:00)  Organism: Pseudomonas aeruginosa (12-11-23 @ 17:00)  Organism: Klebsiella pneumoniae ESBL (12-11-23 @ 17:00)    Culture - Blood (collected 12-06-23 @ 13:08)  Source: .Blood Blood  Final Report (12-11-23 @ 18:01):    No growth at 5 days    Culture - Blood (collected 12-06-23 @ 12:40)  Source: .Blood Blood  Final Report (12-11-23 @ 18:01):    No growth at 5 days            Review of systems  All other systems were reviewed and are negative, except as noted.     PHYSICAL EXAM:  Constitutional: Well developed / well nourished  Eyes: Anicteric, PERRLA  ENMT: nc/at  Neck: supple  Respiratory: CTA B/L  Cardiovascular: RRR  Gastrointestinal: Soft, ND/NT   Genitourinary: Voiding spontaneously  Extremities: SCD's in place and working bilaterally  Vascular: Palpable dp pulses bilaterally, b/l LE foot wound dressing in place  Neurological: A&O x3  Skin: no rashes, ulcerations or lesions other than above   Musculoskeletal: Moving all extremities  Psychiatric: Responsive

## 2023-12-12 NOTE — PROGRESS NOTE ADULT - SUBJECTIVE AND OBJECTIVE BOX
INFECTIOUS DISEASES FOLLOW UP-- Alicia Moore  [phone]    This is a follow up note for this  53yMale with  Osteomyelitis        ROS:  CONSTITUTIONAL:  No fever, good appetite  CARDIOVASCULAR:  No chest pain or palpitations  RESPIRATORY:  No dyspnea  GASTROINTESTINAL:  No nausea, vomiting, diarrhea, or abdominal pain  GENITOURINARY:  No dysuria  NEUROLOGIC:  No headache,     Allergies    Contrast. (Unknown)    Intolerances        ANTIBIOTICS/RELEVANT:  antimicrobials  imipenem/cilastatin  IVPB 500 milliGRAM(s) IV Intermittent every 6 hours  imipenem/cilastatin  IVPB      trimethoprim   80 mG/sulfamethoxazole 400 mG 1 Tablet(s) Oral daily    immunologic:    OTHER:  acetaminophen     Tablet .. 650 milliGRAM(s) Oral every 6 hours PRN  aspirin enteric coated 81 milliGRAM(s) Oral daily  atorvastatin 40 milliGRAM(s) Oral at bedtime  carvedilol 3.125 milliGRAM(s) Oral every 12 hours  famotidine    Tablet 20 milliGRAM(s) Oral daily  insulin glargine Injectable (LANTUS) 10 Unit(s) SubCutaneous at bedtime  insulin lispro (ADMELOG) corrective regimen sliding scale   SubCutaneous three times a day before meals  insulin lispro Injectable (ADMELOG) 10 Unit(s) SubCutaneous three times a day with meals  oxycodone    5 mG/acetaminophen 325 mG 1 Tablet(s) Oral every 4 hours PRN  predniSONE   Tablet 10 milliGRAM(s) Oral daily      Objective:  Vital Signs Last 24 Hrs  T(C): 36.7 (12 Dec 2023 21:25), Max: 37.4 (12 Dec 2023 13:31)  T(F): 98.1 (12 Dec 2023 21:25), Max: 99.3 (12 Dec 2023 13:31)  HR: 76 (12 Dec 2023 21:25) (69 - 86)  BP: 138/78 (12 Dec 2023 21:25) (102/64 - 148/74)  BP(mean): --  RR: 18 (12 Dec 2023 21:25) (18 - 18)  SpO2: 100% (12 Dec 2023 21:25) (97% - 100%)    Parameters below as of 12 Dec 2023 21:25  Patient On (Oxygen Delivery Method): room air        PHYSICAL EXAM:  Constitutional:no acute distress  Eyes:THOM, EOMI  Ear/Nose/Throat: no oral lesions, 	  Respiratory: clear BL  Cardiovascular: S1S2  Gastrointestinal:soft, (+) BS, no tenderness  Extremities:no e/e/c bilateral feet wrapped surgical dressing  plantar area of left foot  s/p toe amputation on right foot  No Lymphadenopathy  IV sites not inflammed.    LABS:                        10.0   3.18  )-----------( 198      ( 12 Dec 2023 07:04 )             31.7     12-12    140  |  106  |  23  ----------------------------<  172<H>  4.6   |  25  |  1.54<H>    Ca    10.0      12 Dec 2023 07:00  Phos  2.7     12-12  Mg     1.9     12-12      PT/INR - ( 11 Dec 2023 06:24 )   PT: 13.4 sec;   INR: 1.29 ratio         PTT - ( 11 Dec 2023 06:24 )  PTT:37.1 sec  Urinalysis Basic - ( 12 Dec 2023 07:00 )    Color: x / Appearance: x / SG: x / pH: x  Gluc: 172 mg/dL / Ketone: x  / Bili: x / Urobili: x   Blood: x / Protein: x / Nitrite: x   Leuk Esterase: x / RBC: x / WBC x   Sq Epi: x / Non Sq Epi: x / Bacteria: x        MICROBIOLOGY:            RECENT CULTURES:  12-11 @ 21:16  .Tissue Other  --  --  --    Rare Klebsiella pneumoniae  Few Staphylococcus epidermidis  --  12-06 @ 16:59  .Abscess left foot  Klebsiella pneumoniae ESBL  Pseudomonas aeruginosa  Enterococcus faecalis  Klebsiella pneumoniae ESBL  RUBY    Numerous Klebsiella pneumoniae ESBL  Numerous Pseudomonas aeruginosa  Numerous Enterococcus faecalis  Normal skin linsey isolated  --  12-06 @ 13:08  .Blood Blood  --  --  --    No growth at 5 days  --  12-06 @ 12:40  .Blood Blood  --  --  --    No growth at 5 days  --      RADIOLOGY & ADDITIONAL STUDIES: INFECTIOUS DISEASES FOLLOW UP-- Alicia Moore  [phone]    This is a follow up note for this  53yMale with  Osteomyelitis        ROS:  CONSTITUTIONAL:  No fever, good appetite  CARDIOVASCULAR:  No chest pain or palpitations  RESPIRATORY:  No dyspnea  GASTROINTESTINAL:  No nausea, vomiting, diarrhea, or abdominal pain  GENITOURINARY:  No dysuria  NEUROLOGIC:  No headache,     Allergies    Contrast. (Unknown)    Intolerances        ANTIBIOTICS/RELEVANT:  antimicrobials  imipenem/cilastatin  IVPB 500 milliGRAM(s) IV Intermittent every 6 hours  imipenem/cilastatin  IVPB      trimethoprim   80 mG/sulfamethoxazole 400 mG 1 Tablet(s) Oral daily    immunologic:    OTHER:  acetaminophen     Tablet .. 650 milliGRAM(s) Oral every 6 hours PRN  aspirin enteric coated 81 milliGRAM(s) Oral daily  atorvastatin 40 milliGRAM(s) Oral at bedtime  carvedilol 3.125 milliGRAM(s) Oral every 12 hours  famotidine    Tablet 20 milliGRAM(s) Oral daily  insulin glargine Injectable (LANTUS) 10 Unit(s) SubCutaneous at bedtime  insulin lispro (ADMELOG) corrective regimen sliding scale   SubCutaneous three times a day before meals  insulin lispro Injectable (ADMELOG) 10 Unit(s) SubCutaneous three times a day with meals  oxycodone    5 mG/acetaminophen 325 mG 1 Tablet(s) Oral every 4 hours PRN  predniSONE   Tablet 10 milliGRAM(s) Oral daily      Objective:  Vital Signs Last 24 Hrs  T(C): 36.7 (12 Dec 2023 21:25), Max: 37.4 (12 Dec 2023 13:31)  T(F): 98.1 (12 Dec 2023 21:25), Max: 99.3 (12 Dec 2023 13:31)  HR: 76 (12 Dec 2023 21:25) (69 - 86)  BP: 138/78 (12 Dec 2023 21:25) (102/64 - 148/74)  BP(mean): --  RR: 18 (12 Dec 2023 21:25) (18 - 18)  SpO2: 100% (12 Dec 2023 21:25) (97% - 100%)    Parameters below as of 12 Dec 2023 21:25  Patient On (Oxygen Delivery Method): room air        PHYSICAL EXAM:  Constitutional:no acute distress  Eyes:THOM, EOMI  Ear/Nose/Throat: no oral lesions, 	  Respiratory: clear BL  Cardiovascular: S1S2  Gastrointestinal:soft, (+) BS, no tenderness  Extremities:no e/e/c bilateral feet wrapped surgical dressing  plantar area of left foot  s/p toe amputation on right foot  No Lymphadenopathy  IV sites not inflammed.    LABS:                        10.0   3.18  )-----------( 198      ( 12 Dec 2023 07:04 )             31.7     12-12    140  |  106  |  23  ----------------------------<  172<H>  4.6   |  25  |  1.54<H>    Ca    10.0      12 Dec 2023 07:00  Phos  2.7     12-12  Mg     1.9     12-12      PT/INR - ( 11 Dec 2023 06:24 )   PT: 13.4 sec;   INR: 1.29 ratio         PTT - ( 11 Dec 2023 06:24 )  PTT:37.1 sec  Urinalysis Basic - ( 12 Dec 2023 07:00 )    Color: x / Appearance: x / SG: x / pH: x  Gluc: 172 mg/dL / Ketone: x  / Bili: x / Urobili: x   Blood: x / Protein: x / Nitrite: x   Leuk Esterase: x / RBC: x / WBC x   Sq Epi: x / Non Sq Epi: x / Bacteria: x        MICROBIOLOGY:            RECENT CULTURES:  12-11 @ 21:16  .Tissue Other  --  --  --    Rare Klebsiella pneumoniae  Few Staphylococcus epidermidis  --  12-06 @ 16:59  .Abscess left foot  Klebsiella pneumoniae ESBL  Pseudomonas aeruginosa  Enterococcus faecalis  Klebsiella pneumoniae ESBL  RUBY    Numerous Klebsiella pneumoniae ESBL  Numerous Pseudomonas aeruginosa  Numerous Enterococcus faecalis  Normal skin linsey isolated  --  12-06 @ 13:08  .Blood Blood  --  --  --    No growth at 5 days  --  12-06 @ 12:40  .Blood Blood  --  --  --    No growth at 5 days  --      RADIOLOGY & ADDITIONAL STUDIES:

## 2023-12-12 NOTE — PHYSICAL THERAPY INITIAL EVALUATION ADULT - ADDITIONAL COMMENTS
Patient lives in a house with his sister and has 3-4 steps to enter. He ambulates with a straight cane and also owns RW

## 2023-12-12 NOTE — PROGRESS NOTE ADULT - ASSESSMENT
ASSESSMENT: 54 y/o M with PMH of HTN, CHF (s/p AICD 2016), CAD (s/p CABG 2015), IDDM, PVD s/p stent x4 in RLE with R big toe/second toe amputation (2021) and ESRD on HD via LUE AVF for 6 years now s/p DDRT 7/16/2023 presenting to the ED with infected R 3rd toe and left 1st metatarsal ulcers. Patient follows with Dr. Malone of Vascular Surgery. Patient states he has had these wounds for a while, follows with Podiatry. However, states that over the past few weeks, they have begun to look worse, specifically the left 1st metatarsal ulcer. Vascular surgery consulted for evaluation.    - s/p Podiatry R partial 3rd ray resection and L foot debridement/graft  - Cx +klebsiella, E faecalis, M Morganii, Nohmei Pagan  - possible angio planning  - vascular will follow    Vascular  [id number]. ASSESSMENT: 54 y/o M with PMH of HTN, CHF (s/p AICD 2016), CAD (s/p CABG 2015), IDDM, PVD s/p stent x4 in RLE with R big toe/second toe amputation (2021) and ESRD on HD via LUE AVF for 6 years now s/p DDRT 7/16/2023 presenting to the ED with infected R 3rd toe and left 1st metatarsal ulcers. Patient follows with Dr. Malone of Vascular Surgery. Patient states he has had these wounds for a while, follows with Podiatry. However, states that over the past few weeks, they have begun to look worse, specifically the left 1st metatarsal ulcer. Vascular surgery consulted for evaluation.    - s/p Podiatry R partial 3rd ray resection and L foot debridement/graft  - Cx +klebsiella, E faecalis, M Morganii, Nohemi Pagan  - possible angio planning  - vascular will follow    Vascular  [id number].

## 2023-12-12 NOTE — PROGRESS NOTE ADULT - ASSESSMENT
CAD s/p cabg  stable   asymptomatic   asa, statin    chronic systolic chf  stable  improved LV function   cont Coreg  off ace/arb/arni for now   s/p ICD    ESRD  s/p transplant   fu with transplant team

## 2023-12-12 NOTE — PROGRESS NOTE ADULT - ASSESSMENT
54 y/o M with past medical history significant for IDDM, CAD s/p CABG x4v (2015) s/p AICD (2016), PVD (4 stents in RLE), HTN, HLD and ESRD previously on HD via LUE AVF (ligated 9/8/23) now s/p DDRT 7/16/2023 with Simulect -> Thymoglobulin induction. His post-transplant course was complicated by DGF and subtly [facility] with +DSAs s/p PLEX/IVIG (8/2023) and L subclavian thrombus remains on Eliquis presents to [facility] ED from wound care clinic on 12/6/23 after concern for osteomyelitis from clinic providers.     [] Osteomyelitis of R foot third digit and left foot 1st toe  - Left foot wound culture growing ESBL Klebsiella, E. Faecalis, M. Morganii, Profidencia Stauartii   - OR w/podiatry yesterday: R partial 3rd toe amp, R foot I&D of necrotic tissue, L foot application of kerecis graft.   - Vascular team following: plan for angio  - ID following: Continue Imipenem (12/10--)  - Blood cultures with NGTD   - Will discuss with TID for ?PICC vs Midline needs for long term antibiotics     [ ] s/p DDRT 7/16/2023   - Baseline Cr ~1.0   - Immuno: Env by level, MMF HELD d/t c/f for osteomyelitis, Pred 10   - PPx: Bactrim, Pepcid   - Valcyte held for leukopenia (CMV negative 12/7)     [ ] IDDM  - Continue Lantus/Lispro home dose     [ ] CHF, CAD s/p CABG, L SC DVT   - Continue Eliquis, ASA 81   - Continue Coreg, Lipitor          52 y/o M with past medical history significant for IDDM, CAD s/p CABG x4v (2015) s/p AICD (2016), PVD (4 stents in RLE), HTN, HLD and ESRD previously on HD via LUE AVF (ligated 9/8/23) now s/p DDRT 7/16/2023 with Simulect -> Thymoglobulin induction. His post-transplant course was complicated by DGF and subtly [facility]R with +DSAs s/p PLEX/IVIG (8/2023) and L subclavian thrombus remains on Eliquis presents to [facility] ED from wound care clinic on 12/6/23 after concern for osteomyelitis from clinic providers.     [] Osteomyelitis of R foot third digit and left foot 1st toe  - Left foot wound culture growing ESBL Klebsiella, E. Faecalis, M. Morganii, Profidencia Stauartii   - OR w/podiatry yesterday: R partial 3rd toe amp, R foot I&D of necrotic tissue, L foot application of kerecis graft.   - Vascular team following: plan for angio  - ID following: Continue Imipenem (12/10--)  - Blood cultures with NGTD   - Will discuss with TID for ?PICC vs Midline needs for long term antibiotics     [ ] s/p DDRT 7/16/2023   - Baseline Cr ~1.0   - Immuno: Env by level, MMF HELD d/t c/f for osteomyelitis, Pred 10   - PPx: Bactrim, Pepcid   - Valcyte held for leukopenia (CMV negative 12/7)     [ ] IDDM  - Continue Lantus/Lispro home dose     [ ] CHF, CAD s/p CABG, L SC DVT   - Continue Eliquis, ASA 81   - Continue Coreg, Lipitor

## 2023-12-13 DIAGNOSIS — M86.9 OSTEOMYELITIS, UNSPECIFIED: ICD-10-CM

## 2023-12-13 LAB
-  AMOXICILLIN/CLAVULANIC ACID: SIGNIFICANT CHANGE UP
-  AMOXICILLIN/CLAVULANIC ACID: SIGNIFICANT CHANGE UP
-  AMPICILLIN/SULBACTAM: SIGNIFICANT CHANGE UP
-  AMPICILLIN: SIGNIFICANT CHANGE UP
-  AMPICILLIN: SIGNIFICANT CHANGE UP
-  AZTREONAM: SIGNIFICANT CHANGE UP
-  AZTREONAM: SIGNIFICANT CHANGE UP
-  CEFAZOLIN: SIGNIFICANT CHANGE UP
-  CEFEPIME: SIGNIFICANT CHANGE UP
-  CEFEPIME: SIGNIFICANT CHANGE UP
-  CEFTRIAXONE: SIGNIFICANT CHANGE UP
-  CEFTRIAXONE: SIGNIFICANT CHANGE UP
-  CIPROFLOXACIN: SIGNIFICANT CHANGE UP
-  CIPROFLOXACIN: SIGNIFICANT CHANGE UP
-  CLINDAMYCIN: SIGNIFICANT CHANGE UP
-  CLINDAMYCIN: SIGNIFICANT CHANGE UP
-  ERTAPENEM: SIGNIFICANT CHANGE UP
-  ERTAPENEM: SIGNIFICANT CHANGE UP
-  ERYTHROMYCIN: SIGNIFICANT CHANGE UP
-  ERYTHROMYCIN: SIGNIFICANT CHANGE UP
-  GENTAMICIN: SIGNIFICANT CHANGE UP
-  IMIPENEM: SIGNIFICANT CHANGE UP
-  IMIPENEM: SIGNIFICANT CHANGE UP
-  LEVOFLOXACIN: SIGNIFICANT CHANGE UP
-  LEVOFLOXACIN: SIGNIFICANT CHANGE UP
-  MEROPENEM: SIGNIFICANT CHANGE UP
-  MEROPENEM: SIGNIFICANT CHANGE UP
-  OXACILLIN: SIGNIFICANT CHANGE UP
-  OXACILLIN: SIGNIFICANT CHANGE UP
-  PENICILLIN: SIGNIFICANT CHANGE UP
-  PENICILLIN: SIGNIFICANT CHANGE UP
-  PIPERACILLIN/TAZOBACTAM: SIGNIFICANT CHANGE UP
-  PIPERACILLIN/TAZOBACTAM: SIGNIFICANT CHANGE UP
-  RIFAMPIN: SIGNIFICANT CHANGE UP
-  RIFAMPIN: SIGNIFICANT CHANGE UP
-  TETRACYCLINE: SIGNIFICANT CHANGE UP
-  TETRACYCLINE: SIGNIFICANT CHANGE UP
-  TOBRAMYCIN: SIGNIFICANT CHANGE UP
-  TOBRAMYCIN: SIGNIFICANT CHANGE UP
-  TRIMETHOPRIM/SULFAMETHOXAZOLE: SIGNIFICANT CHANGE UP
-  VANCOMYCIN: SIGNIFICANT CHANGE UP
-  VANCOMYCIN: SIGNIFICANT CHANGE UP
ACANTHOCYTES BLD QL SMEAR: SLIGHT — SIGNIFICANT CHANGE UP
ACANTHOCYTES BLD QL SMEAR: SLIGHT — SIGNIFICANT CHANGE UP
ANION GAP SERPL CALC-SCNC: 9 MMOL/L — SIGNIFICANT CHANGE UP (ref 5–17)
ANION GAP SERPL CALC-SCNC: 9 MMOL/L — SIGNIFICANT CHANGE UP (ref 5–17)
ANISOCYTOSIS BLD QL: SLIGHT — SIGNIFICANT CHANGE UP
ANISOCYTOSIS BLD QL: SLIGHT — SIGNIFICANT CHANGE UP
BASOPHILS # BLD AUTO: 0.07 K/UL — SIGNIFICANT CHANGE UP (ref 0–0.2)
BASOPHILS # BLD AUTO: 0.07 K/UL — SIGNIFICANT CHANGE UP (ref 0–0.2)
BASOPHILS NFR BLD AUTO: 1.8 % — SIGNIFICANT CHANGE UP (ref 0–2)
BASOPHILS NFR BLD AUTO: 1.8 % — SIGNIFICANT CHANGE UP (ref 0–2)
BUN SERPL-MCNC: 33 MG/DL — HIGH (ref 7–23)
BUN SERPL-MCNC: 33 MG/DL — HIGH (ref 7–23)
BURR CELLS BLD QL SMEAR: PRESENT — SIGNIFICANT CHANGE UP
BURR CELLS BLD QL SMEAR: PRESENT — SIGNIFICANT CHANGE UP
CALCIUM SERPL-MCNC: 10.1 MG/DL — SIGNIFICANT CHANGE UP (ref 8.4–10.5)
CALCIUM SERPL-MCNC: 10.1 MG/DL — SIGNIFICANT CHANGE UP (ref 8.4–10.5)
CHLORIDE SERPL-SCNC: 105 MMOL/L — SIGNIFICANT CHANGE UP (ref 96–108)
CHLORIDE SERPL-SCNC: 105 MMOL/L — SIGNIFICANT CHANGE UP (ref 96–108)
CO2 SERPL-SCNC: 22 MMOL/L — SIGNIFICANT CHANGE UP (ref 22–31)
CO2 SERPL-SCNC: 22 MMOL/L — SIGNIFICANT CHANGE UP (ref 22–31)
CREAT SERPL-MCNC: 1.37 MG/DL — HIGH (ref 0.5–1.3)
CREAT SERPL-MCNC: 1.37 MG/DL — HIGH (ref 0.5–1.3)
DACRYOCYTES BLD QL SMEAR: SLIGHT — SIGNIFICANT CHANGE UP
DACRYOCYTES BLD QL SMEAR: SLIGHT — SIGNIFICANT CHANGE UP
EGFR: 62 ML/MIN/1.73M2 — SIGNIFICANT CHANGE UP
EGFR: 62 ML/MIN/1.73M2 — SIGNIFICANT CHANGE UP
ELLIPTOCYTES BLD QL SMEAR: SLIGHT — SIGNIFICANT CHANGE UP
ELLIPTOCYTES BLD QL SMEAR: SLIGHT — SIGNIFICANT CHANGE UP
EOSINOPHIL # BLD AUTO: 0.11 K/UL — SIGNIFICANT CHANGE UP (ref 0–0.5)
EOSINOPHIL # BLD AUTO: 0.11 K/UL — SIGNIFICANT CHANGE UP (ref 0–0.5)
EOSINOPHIL NFR BLD AUTO: 2.7 % — SIGNIFICANT CHANGE UP (ref 0–6)
EOSINOPHIL NFR BLD AUTO: 2.7 % — SIGNIFICANT CHANGE UP (ref 0–6)
GIANT PLATELETS BLD QL SMEAR: PRESENT — SIGNIFICANT CHANGE UP
GIANT PLATELETS BLD QL SMEAR: PRESENT — SIGNIFICANT CHANGE UP
GLUCOSE BLDC GLUCOMTR-MCNC: 132 MG/DL — HIGH (ref 70–99)
GLUCOSE BLDC GLUCOMTR-MCNC: 132 MG/DL — HIGH (ref 70–99)
GLUCOSE BLDC GLUCOMTR-MCNC: 162 MG/DL — HIGH (ref 70–99)
GLUCOSE BLDC GLUCOMTR-MCNC: 162 MG/DL — HIGH (ref 70–99)
GLUCOSE BLDC GLUCOMTR-MCNC: 197 MG/DL — HIGH (ref 70–99)
GLUCOSE BLDC GLUCOMTR-MCNC: 197 MG/DL — HIGH (ref 70–99)
GLUCOSE BLDC GLUCOMTR-MCNC: 81 MG/DL — SIGNIFICANT CHANGE UP (ref 70–99)
GLUCOSE BLDC GLUCOMTR-MCNC: 81 MG/DL — SIGNIFICANT CHANGE UP (ref 70–99)
GLUCOSE SERPL-MCNC: 130 MG/DL — HIGH (ref 70–99)
GLUCOSE SERPL-MCNC: 130 MG/DL — HIGH (ref 70–99)
HCT VFR BLD CALC: 31.9 % — LOW (ref 39–50)
HCT VFR BLD CALC: 31.9 % — LOW (ref 39–50)
HGB BLD-MCNC: 10 G/DL — LOW (ref 13–17)
HGB BLD-MCNC: 10 G/DL — LOW (ref 13–17)
LYMPHOCYTES # BLD AUTO: 0.32 K/UL — LOW (ref 1–3.3)
LYMPHOCYTES # BLD AUTO: 0.32 K/UL — LOW (ref 1–3.3)
LYMPHOCYTES # BLD AUTO: 8.1 % — LOW (ref 13–44)
LYMPHOCYTES # BLD AUTO: 8.1 % — LOW (ref 13–44)
MACROCYTES BLD QL: SLIGHT — SIGNIFICANT CHANGE UP
MACROCYTES BLD QL: SLIGHT — SIGNIFICANT CHANGE UP
MAGNESIUM SERPL-MCNC: 1.7 MG/DL — SIGNIFICANT CHANGE UP (ref 1.6–2.6)
MAGNESIUM SERPL-MCNC: 1.7 MG/DL — SIGNIFICANT CHANGE UP (ref 1.6–2.6)
MANUAL SMEAR VERIFICATION: SIGNIFICANT CHANGE UP
MANUAL SMEAR VERIFICATION: SIGNIFICANT CHANGE UP
MCHC RBC-ENTMCNC: 28 PG — SIGNIFICANT CHANGE UP (ref 27–34)
MCHC RBC-ENTMCNC: 28 PG — SIGNIFICANT CHANGE UP (ref 27–34)
MCHC RBC-ENTMCNC: 31.3 GM/DL — LOW (ref 32–36)
MCHC RBC-ENTMCNC: 31.3 GM/DL — LOW (ref 32–36)
MCV RBC AUTO: 89.4 FL — SIGNIFICANT CHANGE UP (ref 80–100)
MCV RBC AUTO: 89.4 FL — SIGNIFICANT CHANGE UP (ref 80–100)
METHOD TYPE: SIGNIFICANT CHANGE UP
MONOCYTES # BLD AUTO: 0.35 K/UL — SIGNIFICANT CHANGE UP (ref 0–0.9)
MONOCYTES # BLD AUTO: 0.35 K/UL — SIGNIFICANT CHANGE UP (ref 0–0.9)
MONOCYTES NFR BLD AUTO: 9 % — SIGNIFICANT CHANGE UP (ref 2–14)
MONOCYTES NFR BLD AUTO: 9 % — SIGNIFICANT CHANGE UP (ref 2–14)
NEUTROPHILS # BLD AUTO: 3.09 K/UL — SIGNIFICANT CHANGE UP (ref 1.8–7.4)
NEUTROPHILS # BLD AUTO: 3.09 K/UL — SIGNIFICANT CHANGE UP (ref 1.8–7.4)
NEUTROPHILS NFR BLD AUTO: 77.5 % — HIGH (ref 43–77)
NEUTROPHILS NFR BLD AUTO: 77.5 % — HIGH (ref 43–77)
NEUTS BAND # BLD: 0.9 % — SIGNIFICANT CHANGE UP (ref 0–8)
NEUTS BAND # BLD: 0.9 % — SIGNIFICANT CHANGE UP (ref 0–8)
OVALOCYTES BLD QL SMEAR: SLIGHT — SIGNIFICANT CHANGE UP
OVALOCYTES BLD QL SMEAR: SLIGHT — SIGNIFICANT CHANGE UP
PHOSPHATE SERPL-MCNC: 2.1 MG/DL — LOW (ref 2.5–4.5)
PHOSPHATE SERPL-MCNC: 2.1 MG/DL — LOW (ref 2.5–4.5)
PLAT MORPH BLD: ABNORMAL
PLAT MORPH BLD: ABNORMAL
PLATELET # BLD AUTO: 191 K/UL — SIGNIFICANT CHANGE UP (ref 150–400)
PLATELET # BLD AUTO: 191 K/UL — SIGNIFICANT CHANGE UP (ref 150–400)
POIKILOCYTOSIS BLD QL AUTO: SLIGHT — SIGNIFICANT CHANGE UP
POIKILOCYTOSIS BLD QL AUTO: SLIGHT — SIGNIFICANT CHANGE UP
POLYCHROMASIA BLD QL SMEAR: SLIGHT — SIGNIFICANT CHANGE UP
POLYCHROMASIA BLD QL SMEAR: SLIGHT — SIGNIFICANT CHANGE UP
POTASSIUM SERPL-MCNC: 4.4 MMOL/L — SIGNIFICANT CHANGE UP (ref 3.5–5.3)
POTASSIUM SERPL-MCNC: 4.4 MMOL/L — SIGNIFICANT CHANGE UP (ref 3.5–5.3)
POTASSIUM SERPL-SCNC: 4.4 MMOL/L — SIGNIFICANT CHANGE UP (ref 3.5–5.3)
POTASSIUM SERPL-SCNC: 4.4 MMOL/L — SIGNIFICANT CHANGE UP (ref 3.5–5.3)
RBC # BLD: 3.57 M/UL — LOW (ref 4.2–5.8)
RBC # BLD: 3.57 M/UL — LOW (ref 4.2–5.8)
RBC # FLD: 13 % — SIGNIFICANT CHANGE UP (ref 10.3–14.5)
RBC # FLD: 13 % — SIGNIFICANT CHANGE UP (ref 10.3–14.5)
RBC BLD AUTO: ABNORMAL
RBC BLD AUTO: ABNORMAL
SODIUM SERPL-SCNC: 136 MMOL/L — SIGNIFICANT CHANGE UP (ref 135–145)
SODIUM SERPL-SCNC: 136 MMOL/L — SIGNIFICANT CHANGE UP (ref 135–145)
TACROLIMUS SERPL-MCNC: 6.2 NG/ML — SIGNIFICANT CHANGE UP
TACROLIMUS SERPL-MCNC: 6.2 NG/ML — SIGNIFICANT CHANGE UP
WBC # BLD: 3.94 K/UL — SIGNIFICANT CHANGE UP (ref 3.8–10.5)
WBC # BLD: 3.94 K/UL — SIGNIFICANT CHANGE UP (ref 3.8–10.5)
WBC # FLD AUTO: 3.94 K/UL — SIGNIFICANT CHANGE UP (ref 3.8–10.5)
WBC # FLD AUTO: 3.94 K/UL — SIGNIFICANT CHANGE UP (ref 3.8–10.5)

## 2023-12-13 PROCEDURE — 99232 SBSQ HOSP IP/OBS MODERATE 35: CPT | Mod: GC

## 2023-12-13 PROCEDURE — 99232 SBSQ HOSP IP/OBS MODERATE 35: CPT

## 2023-12-13 RX ORDER — MAGNESIUM SULFATE 500 MG/ML
1 VIAL (ML) INJECTION ONCE
Refills: 0 | Status: COMPLETED | OUTPATIENT
Start: 2023-12-13 | End: 2023-12-13

## 2023-12-13 RX ORDER — HEPARIN SODIUM 5000 [USP'U]/ML
INJECTION INTRAVENOUS; SUBCUTANEOUS
Qty: 25000 | Refills: 0 | Status: DISCONTINUED | OUTPATIENT
Start: 2023-12-13 | End: 2023-12-14

## 2023-12-13 RX ORDER — HEPARIN SODIUM 5000 [USP'U]/ML
3500 INJECTION INTRAVENOUS; SUBCUTANEOUS EVERY 6 HOURS
Refills: 0 | Status: DISCONTINUED | OUTPATIENT
Start: 2023-12-13 | End: 2023-12-14

## 2023-12-13 RX ORDER — SODIUM CHLORIDE 9 MG/ML
1000 INJECTION INTRAMUSCULAR; INTRAVENOUS; SUBCUTANEOUS
Refills: 0 | Status: DISCONTINUED | OUTPATIENT
Start: 2023-12-13 | End: 2023-12-14

## 2023-12-13 RX ORDER — HEPARIN SODIUM 5000 [USP'U]/ML
7000 INJECTION INTRAVENOUS; SUBCUTANEOUS EVERY 6 HOURS
Refills: 0 | Status: DISCONTINUED | OUTPATIENT
Start: 2023-12-13 | End: 2023-12-14

## 2023-12-13 RX ADMIN — Medication 10 MILLIGRAM(S): at 05:50

## 2023-12-13 RX ADMIN — ATORVASTATIN CALCIUM 40 MILLIGRAM(S): 80 TABLET, FILM COATED ORAL at 21:45

## 2023-12-13 RX ADMIN — CARVEDILOL PHOSPHATE 3.12 MILLIGRAM(S): 80 CAPSULE, EXTENDED RELEASE ORAL at 05:50

## 2023-12-13 RX ADMIN — HEPARIN SODIUM 1600 UNIT(S)/HR: 5000 INJECTION INTRAVENOUS; SUBCUTANEOUS at 18:58

## 2023-12-13 RX ADMIN — TACROLIMUS 1 MILLIGRAM(S): 5 CAPSULE ORAL at 08:48

## 2023-12-13 RX ADMIN — HEPARIN SODIUM 1600 UNIT(S)/HR: 5000 INJECTION INTRAVENOUS; SUBCUTANEOUS at 19:23

## 2023-12-13 RX ADMIN — Medication 2: at 17:55

## 2023-12-13 RX ADMIN — Medication 10 UNIT(S): at 09:31

## 2023-12-13 RX ADMIN — Medication 10 UNIT(S): at 13:11

## 2023-12-13 RX ADMIN — FAMOTIDINE 20 MILLIGRAM(S): 10 INJECTION INTRAVENOUS at 11:56

## 2023-12-13 RX ADMIN — IMIPENEM AND CILASTATIN 100 MILLIGRAM(S): 250; 250 INJECTION, POWDER, FOR SOLUTION INTRAVENOUS at 05:48

## 2023-12-13 RX ADMIN — Medication 1 TABLET(S): at 11:56

## 2023-12-13 RX ADMIN — Medication 81 MILLIGRAM(S): at 11:56

## 2023-12-13 RX ADMIN — APIXABAN 5 MILLIGRAM(S): 2.5 TABLET, FILM COATED ORAL at 05:53

## 2023-12-13 RX ADMIN — Medication 100 GRAM(S): at 11:55

## 2023-12-13 RX ADMIN — Medication 10 UNIT(S): at 17:56

## 2023-12-13 RX ADMIN — IMIPENEM AND CILASTATIN 100 MILLIGRAM(S): 250; 250 INJECTION, POWDER, FOR SOLUTION INTRAVENOUS at 11:54

## 2023-12-13 RX ADMIN — Medication 63.75 MILLIMOLE(S): at 11:55

## 2023-12-13 RX ADMIN — CARVEDILOL PHOSPHATE 3.12 MILLIGRAM(S): 80 CAPSULE, EXTENDED RELEASE ORAL at 17:57

## 2023-12-13 RX ADMIN — IMIPENEM AND CILASTATIN 100 MILLIGRAM(S): 250; 250 INJECTION, POWDER, FOR SOLUTION INTRAVENOUS at 17:51

## 2023-12-13 RX ADMIN — SODIUM CHLORIDE 75 MILLILITER(S): 9 INJECTION INTRAMUSCULAR; INTRAVENOUS; SUBCUTANEOUS at 21:46

## 2023-12-13 RX ADMIN — Medication 2: at 13:11

## 2023-12-13 NOTE — PROGRESS NOTE ADULT - ASSESSMENT
ASSESSMENT: 52 y/o M with PMH of HTN, CHF (s/p AICD 2016), CAD (s/p CABG 2015), IDDM, PVD s/p stent x4 in RLE with R big toe/second toe amputation (2021) and ESRD on HD via LUE AVF for 6 years now s/p DDRT 7/16/2023 presenting to the ED with infected R 3rd toe and left 1st metatarsal ulcers. Patient follows with Dr. Malone of Vascular Surgery. Patient states he has had these wounds for a while, follows with Podiatry. However, states that over the past few weeks, they have begun to look worse, specifically the left 1st metatarsal ulcer. Vascular surgery consulted for evaluation.    - LLE Angiography planning for tomorrow 12/14  - Please hold eliquis until after angio; may be anticoagulated on heparin  - s/p Podiatry R partial 3rd ray resection and L foot debridement/graft  - Cx +klebsiella, E faecalis, M Morganii, Profidencia Stauchanelii  - vascular will follow    Vascular  [id number]. ASSESSMENT: 54 y/o M with PMH of HTN, CHF (s/p AICD 2016), CAD (s/p CABG 2015), IDDM, PVD s/p stent x4 in RLE with R big toe/second toe amputation (2021) and ESRD on HD via LUE AVF for 6 years now s/p DDRT 7/16/2023 presenting to the ED with infected R 3rd toe and left 1st metatarsal ulcers. Patient follows with Dr. Malone of Vascular Surgery. Patient states he has had these wounds for a while, follows with Podiatry. However, states that over the past few weeks, they have begun to look worse, specifically the left 1st metatarsal ulcer. Vascular surgery consulted for evaluation.    - LLE Angiography tent  dain for fri  - Please hold eliquis until after angio; may be anticoagulated on heparin  - s/p Podiatry R partial 3rd ray resection and L foot debridement/graft  will d/w podiatry att  wound healing status of callie op sites prior to proceeding w  lle angio   - Cx +klebsiella, E faecalis, M Morganii, Profidencia Stauartii  - vascular will follow    Vascular  [id number].

## 2023-12-13 NOTE — PROGRESS NOTE ADULT - ASSESSMENT
53 yr old man with ESRD on HD for 6 years. S/p DDRT on 7/14/23, initial DGF needing HD until 8/7/23.   H/o ABMR 8/2023 with ~ 6000 class II DSA treated with steroids, plex/ivig and ritux.   Recovered graft function  to baseline creatinine of 1.3mg/dL.   PMH: DMII on insulin, CAD, CHF, s/p CABG 2015, AICD (2016), PVD, is s/p 4 stents in R leg , right big toe and second toe amputation 2021.   He is admitted with osteomyelitis. He is on IV imipenem/cilastatin  He underwent Right foot partial 3rd ray resection and left foot wound debridement on 12/11.  Planned for angiogram    - S/p DDRT 7/2023 with early ABMR treated, creatinine down o 1.37 today stable.   - Immunosuppression s/p thymo induction, rx for ABMR with plex/ivig/ritux 8/2023    Envarsus was on hold for elevated tac level of, resumed at 1mg yesterday, level today 6.2 at goal. MMF on hold for infection, continue prednisone 10mg daily.     Continue bactrim for PCP prophylaxis. CMV PCR negative on 12/7. Valcyte d/siena   - Osteomyelitis - continue imipenem/cilastatin. Podiatry and ID following.   - DM II continue Lantus and Lispro   - HTN - continue low dose coreg 3.125mg po q12h   - CAD/PAD- continue ASA 81, atorvastatin 40. Angiogram planned. Hold apixaban, start heparin drip, give IV fluids pre and post to minimize risk of contrast induced nephropathy

## 2023-12-13 NOTE — PROGRESS NOTE ADULT - ASSESSMENT
52 y/o M with past medical history significant for IDDM, CAD s/p CABG x4v (2015) s/p AICD (2016), PVD (4 stents in RLE), HTN, HLD and ESRD previously on HD via LUE AVF (ligated 9/8/23) now s/p DDRT 7/16/2023 with Simulect -> Thymoglobulin induction. His post-transplant course was complicated by DGF and subtly AMR with +DSAs s/p PLEX/IVIG (8/2023) and L subclavian thrombus remains on Eliquis presents to [facility] ED from wound care clinic on 12/6/23 after concern for osteomyelitis from clinic providers.     [] Osteomyelitis of R foot third digit and left foot 1st toe  - Left foot wound culture growing ESBL Klebsiella, E. Faecalis, M. Morganii, Profidencia Stauartii   - OR w/podiatry 12/11: R partial 3rd toe amp, R foot I&D of necrotic tissue, L foot application of kerecis graft.   - Cx: klebsiella/staph epidermidis  - Vascular team: LLE angiography planning for tomorrow 12/14, will hold Eliquis and start on heparin gtt tonight  - ID following: Continue Imipenem (12/10--)  - Blood cultures with NGTD   - Will discuss with TID for ?PICC vs Midline needs for long term antibiotics     [ ] s/p DDRT 7/16/2023   - Baseline Cr ~1.0   - Immuno: Env by level, MMF HELD d/t c/f for osteomyelitis, Pred 10   - PPx: Bactrim, Pepcid   - Valcyte held for leukopenia (CMV negative 12/7)     [ ] IDDM  - Continue Lantus/Lispro home dose     [ ] CHF, CAD s/p CABG, L SC DVT   - Continue Eliquis, ASA 81   - Continue Coreg, Lipitor          54 y/o M with past medical history significant for IDDM, CAD s/p CABG x4v (2015) s/p AICD (2016), PVD (4 stents in RLE), HTN, HLD and ESRD previously on HD via LUE AVF (ligated 9/8/23) now s/p DDRT 7/16/2023 with Simulect -> Thymoglobulin induction. His post-transplant course was complicated by DGF and subtly AMR with +DSAs s/p PLEX/IVIG (8/2023) and L subclavian thrombus remains on Eliquis presents to [facility] ED from wound care clinic on 12/6/23 after concern for osteomyelitis from clinic providers.     [] Osteomyelitis of R foot third digit and left foot 1st toe  - Left foot wound culture growing ESBL Klebsiella, E. Faecalis, M. Morganii, Profidencia Stauartii   - OR w/podiatry 12/11: R partial 3rd toe amp, R foot I&D of necrotic tissue, L foot application of kerecis graft.   - Cx: klebsiella/staph epidermidis  - Vascular team: LLE angiography planning for tomorrow 12/14, will hold Eliquis and start on heparin gtt tonight  - ID following: Continue Imipenem (12/10--)  - Blood cultures with NGTD   - Will discuss with TID for ?PICC vs Midline needs for long term antibiotics     [ ] s/p DDRT 7/16/2023   - Baseline Cr ~1.0   - Immuno: Env by level, MMF HELD d/t c/f for osteomyelitis, Pred 10   - PPx: Bactrim, Pepcid   - Valcyte held for leukopenia (CMV negative 12/7)     [ ] IDDM  - Continue Lantus/Lispro home dose     [ ] CHF, CAD s/p CABG, L SC DVT   - Continue Eliquis, ASA 81   - Continue Coreg, Lipitor

## 2023-12-13 NOTE — PROGRESS NOTE ADULT - SUBJECTIVE AND OBJECTIVE BOX
Transplant Surgery - Multidisciplinary Progress Note  --------------------------------------------------------------  DDRT 7/16/2023    Present: Patient seen and examined with multidisciplinary team including Transplant Surgeon:  Dr. Vizcaino. Transplant Nephrologist: Dr. Aguilar,  Transplant Pharmacist: STEPHANY Reynoso and unit RN during am rounds.  Disciplines not in attendance will be notified of the plan.     HPI:  54 y/o M with past medical history significant for HTN, CHF (s/p AICD 2016--last interrogated 7/15/23), CAD (s/p CABG 2015), IDDM, PVD s/p stent x4 in RLE with R big toe/second toe amputation (2021) and ESRD on HD via LUE AVF for 6 years now s/p DDRT 7/16/2023 with Thymoglobulin induction. Post-transplant course complicated by severe constipation requiring disimpaction, DGF requiring hemodialysis and LUE swelling with concern for L subclavian thrombus on Eliquis, now s/p IR fistulogram 7/7/23 with balloon angioplasty of subclavian vein. He was discharged home on 7/27/23     Presented to [facility] ED from wound care clinic on 12/6/23 after concern for osteomyelitis from clinic providers.     Interval Events:  - Afebrile, VSS  - OR w/podiatry 12/11: R partial 3rd toe amp, R foot I&D of necrotic tissue, L foot application of kerecis graft.   - Cx: klebsiella/staph epidermidis    Immunosuppression:  Maintenance Immuno: Envarsus by level, MMF held, Pred 10mg    Potential Discharge date: TBD  Plan of care: see below          MEDICATIONS  (STANDING):  aspirin enteric coated 81 milliGRAM(s) Oral daily  atorvastatin 40 milliGRAM(s) Oral at bedtime  carvedilol 3.125 milliGRAM(s) Oral every 12 hours  famotidine    Tablet 20 milliGRAM(s) Oral daily  imipenem/cilastatin  IVPB 500 milliGRAM(s) IV Intermittent every 6 hours  imipenem/cilastatin  IVPB      insulin glargine Injectable (LANTUS) 10 Unit(s) SubCutaneous at bedtime  insulin lispro (ADMELOG) corrective regimen sliding scale   SubCutaneous three times a day before meals  insulin lispro Injectable (ADMELOG) 10 Unit(s) SubCutaneous three times a day with meals  predniSONE   Tablet 10 milliGRAM(s) Oral daily  tacrolimus ER Tablet (ENVARSUS XR) 1 milliGRAM(s) Oral <User Schedule>  trimethoprim   80 mG/sulfamethoxazole 400 mG 1 Tablet(s) Oral daily    MEDICATIONS  (PRN):  acetaminophen     Tablet .. 650 milliGRAM(s) Oral every 6 hours PRN Mild Pain (1 - 3)  oxycodone    5 mG/acetaminophen 325 mG 1 Tablet(s) Oral every 4 hours PRN Moderate Pain (4 - 6)      PAST MEDICAL & SURGICAL HISTORY:  Diabetes mellitus  type 2      Foot ulcer due to secondary DM      Coronary artery disease      Acute on chronic systolic heart failure      H/O kidney transplant      Breast Reduction  at age 18      Toe amputation status, left      S/P CABG (coronary artery bypass graft)      AICD (automatic cardioverter/defibrillator) present          Vital Signs Last 24 Hrs  T(C): 36.2 (13 Dec 2023 13:33), Max: 36.9 (13 Dec 2023 00:07)  T(F): 97.2 (13 Dec 2023 13:33), Max: 98.4 (13 Dec 2023 00:07)  HR: 71 (13 Dec 2023 13:33) (71 - 77)  BP: 152/74 (13 Dec 2023 13:33) (131/76 - 167/71)  BP(mean): --  RR: 18 (13 Dec 2023 13:33) (18 - 18)  SpO2: 100% (13 Dec 2023 13:33) (98% - 100%)    Parameters below as of 13 Dec 2023 13:33  Patient On (Oxygen Delivery Method): room air        I&O's Summary    12 Dec 2023 07:01  -  13 Dec 2023 07:00  --------------------------------------------------------  IN: 550 mL / OUT: 0 mL / NET: 550 mL    13 Dec 2023 07:01  -  13 Dec 2023 16:14  --------------------------------------------------------  IN: 480 mL / OUT: 450 mL / NET: 30 mL                              10.0   3.94  )-----------( 191      ( 13 Dec 2023 07:11 )             31.9     12-13    136  |  105  |  33<H>  ----------------------------<  130<H>  4.4   |  22  |  1.37<H>    Ca    10.1      13 Dec 2023 07:10  Phos  2.1     12-13  Mg     1.7     12-13      Tacrolimus (), Serum: 6.2 ng/mL (12-13 @ 07:11)        Culture - Tissue with Gram Stain (collected 12-11-23 @ 21:16)  Source: .Tissue Other  Gram Stain (12-12-23 @ 01:58):    Numerous polymorphonuclear leukocytes seen per low power field    No organisms seen per oil power field  Preliminary Report (12-12-23 @ 20:46):    No growth    Culture - Tissue with Gram Stain (collected 12-11-23 @ 21:16)  Source: .Tissue Other  Gram Stain (12-12-23 @ 22:53):    No polymorphonuclear cells seen per low power field    Rare Gram Variable Coccobacilli seen per oil power field  Preliminary Report (12-12-23 @ 22:53):    Rare Klebsiella pneumoniae    Few Staphylococcus epidermidis    Culture - Abscess with Gram Stain (collected 12-06-23 @ 16:59)  Source: .Abscess left foot  Final Report (12-11-23 @ 17:00):    Numerous Klebsiella pneumoniae ESBL    Numerous Pseudomonas aeruginosa    Numerous Enterococcus faecalis    Normal skin linsey isolated  Organism: Klebsiella pneumoniae ESBL  Pseudomonas aeruginosa  Enterococcus faecalis (12-11-23 @ 17:00)  Organism: Enterococcus faecalis (12-11-23 @ 17:00)  Organism: Enterococcus faecalis (12-11-23 @ 17:00)  Organism: Pseudomonas aeruginosa (12-11-23 @ 17:00)  Organism: Klebsiella pneumoniae ESBL (12-11-23 @ 17:00)                  Review of systems  All other systems were reviewed and are negative, except as noted.     PHYSICAL EXAM:  Constitutional: Well developed / well nourished  Eyes: Anicteric, PERRLA  ENMT: nc/at  Neck: supple  Respiratory: CTA B/L  Cardiovascular: RRR  Gastrointestinal: Soft, ND/NT   Genitourinary: Voiding spontaneously  Extremities: SCD's in place and working bilaterally  Vascular: Palpable dp pulses bilaterally, b/l LE foot wound dressing in place  Neurological: A&O x3  Skin: no rashes, ulcerations or lesions other than above   Musculoskeletal: Moving all extremities  Psychiatric: Responsive Transplant Surgery - Multidisciplinary Progress Note  --------------------------------------------------------------  DDRT 7/16/2023    Present: Patient seen and examined with multidisciplinary team including Transplant Surgeon:  Dr. Vizcaino. Transplant Nephrologist: Dr. Aguilar,  Transplant Pharmacist: STEPHANY Reynoso and unit RN during am rounds.  Disciplines not in attendance will be notified of the plan.     HPI:  52 y/o M with past medical history significant for HTN, CHF (s/p AICD 2016--last interrogated 7/15/23), CAD (s/p CABG 2015), IDDM, PVD s/p stent x4 in RLE with R big toe/second toe amputation (2021) and ESRD on HD via LUE AVF for 6 years now s/p DDRT 7/16/2023 with Thymoglobulin induction. Post-transplant course complicated by severe constipation requiring disimpaction, DGF requiring hemodialysis and LUE swelling with concern for L subclavian thrombus on Eliquis, now s/p IR fistulogram 7/7/23 with balloon angioplasty of subclavian vein. He was discharged home on 7/27/23     Presented to [facility] ED from wound care clinic on 12/6/23 after concern for osteomyelitis from clinic providers.     Interval Events:  - Afebrile, VSS  - OR w/podiatry 12/11: R partial 3rd toe amp, R foot I&D of necrotic tissue, L foot application of kerecis graft.   - Cx: klebsiella/staph epidermidis    Immunosuppression:  Maintenance Immuno: Envarsus by level, MMF held, Pred 10mg    Potential Discharge date: TBD  Plan of care: see below          MEDICATIONS  (STANDING):  aspirin enteric coated 81 milliGRAM(s) Oral daily  atorvastatin 40 milliGRAM(s) Oral at bedtime  carvedilol 3.125 milliGRAM(s) Oral every 12 hours  famotidine    Tablet 20 milliGRAM(s) Oral daily  imipenem/cilastatin  IVPB 500 milliGRAM(s) IV Intermittent every 6 hours  imipenem/cilastatin  IVPB      insulin glargine Injectable (LANTUS) 10 Unit(s) SubCutaneous at bedtime  insulin lispro (ADMELOG) corrective regimen sliding scale   SubCutaneous three times a day before meals  insulin lispro Injectable (ADMELOG) 10 Unit(s) SubCutaneous three times a day with meals  predniSONE   Tablet 10 milliGRAM(s) Oral daily  tacrolimus ER Tablet (ENVARSUS XR) 1 milliGRAM(s) Oral <User Schedule>  trimethoprim   80 mG/sulfamethoxazole 400 mG 1 Tablet(s) Oral daily    MEDICATIONS  (PRN):  acetaminophen     Tablet .. 650 milliGRAM(s) Oral every 6 hours PRN Mild Pain (1 - 3)  oxycodone    5 mG/acetaminophen 325 mG 1 Tablet(s) Oral every 4 hours PRN Moderate Pain (4 - 6)      PAST MEDICAL & SURGICAL HISTORY:  Diabetes mellitus  type 2      Foot ulcer due to secondary DM      Coronary artery disease      Acute on chronic systolic heart failure      H/O kidney transplant      Breast Reduction  at age 18      Toe amputation status, left      S/P CABG (coronary artery bypass graft)      AICD (automatic cardioverter/defibrillator) present          Vital Signs Last 24 Hrs  T(C): 36.2 (13 Dec 2023 13:33), Max: 36.9 (13 Dec 2023 00:07)  T(F): 97.2 (13 Dec 2023 13:33), Max: 98.4 (13 Dec 2023 00:07)  HR: 71 (13 Dec 2023 13:33) (71 - 77)  BP: 152/74 (13 Dec 2023 13:33) (131/76 - 167/71)  BP(mean): --  RR: 18 (13 Dec 2023 13:33) (18 - 18)  SpO2: 100% (13 Dec 2023 13:33) (98% - 100%)    Parameters below as of 13 Dec 2023 13:33  Patient On (Oxygen Delivery Method): room air        I&O's Summary    12 Dec 2023 07:01  -  13 Dec 2023 07:00  --------------------------------------------------------  IN: 550 mL / OUT: 0 mL / NET: 550 mL    13 Dec 2023 07:01  -  13 Dec 2023 16:14  --------------------------------------------------------  IN: 480 mL / OUT: 450 mL / NET: 30 mL                              10.0   3.94  )-----------( 191      ( 13 Dec 2023 07:11 )             31.9     12-13    136  |  105  |  33<H>  ----------------------------<  130<H>  4.4   |  22  |  1.37<H>    Ca    10.1      13 Dec 2023 07:10  Phos  2.1     12-13  Mg     1.7     12-13      Tacrolimus (), Serum: 6.2 ng/mL (12-13 @ 07:11)        Culture - Tissue with Gram Stain (collected 12-11-23 @ 21:16)  Source: .Tissue Other  Gram Stain (12-12-23 @ 01:58):    Numerous polymorphonuclear leukocytes seen per low power field    No organisms seen per oil power field  Preliminary Report (12-12-23 @ 20:46):    No growth    Culture - Tissue with Gram Stain (collected 12-11-23 @ 21:16)  Source: .Tissue Other  Gram Stain (12-12-23 @ 22:53):    No polymorphonuclear cells seen per low power field    Rare Gram Variable Coccobacilli seen per oil power field  Preliminary Report (12-12-23 @ 22:53):    Rare Klebsiella pneumoniae    Few Staphylococcus epidermidis    Culture - Abscess with Gram Stain (collected 12-06-23 @ 16:59)  Source: .Abscess left foot  Final Report (12-11-23 @ 17:00):    Numerous Klebsiella pneumoniae ESBL    Numerous Pseudomonas aeruginosa    Numerous Enterococcus faecalis    Normal skin linsey isolated  Organism: Klebsiella pneumoniae ESBL  Pseudomonas aeruginosa  Enterococcus faecalis (12-11-23 @ 17:00)  Organism: Enterococcus faecalis (12-11-23 @ 17:00)  Organism: Enterococcus faecalis (12-11-23 @ 17:00)  Organism: Pseudomonas aeruginosa (12-11-23 @ 17:00)  Organism: Klebsiella pneumoniae ESBL (12-11-23 @ 17:00)                  Review of systems  All other systems were reviewed and are negative, except as noted.     PHYSICAL EXAM:  Constitutional: Well developed / well nourished  Eyes: Anicteric, PERRLA  ENMT: nc/at  Neck: supple  Respiratory: CTA B/L  Cardiovascular: RRR  Gastrointestinal: Soft, ND/NT   Genitourinary: Voiding spontaneously  Extremities: SCD's in place and working bilaterally  Vascular: Palpable dp pulses bilaterally, b/l LE foot wound dressing in place  Neurological: A&O x3  Skin: no rashes, ulcerations or lesions other than above   Musculoskeletal: Moving all extremities  Psychiatric: Responsive

## 2023-12-13 NOTE — PRE PROCEDURE NOTE - PRE PROCEDURE EVALUATION
Surgeon: tarsha  Procedure: LLE Angiography planning for tomorrow     Vital Signs Last 24 Hrs  T(C): 36.2 (13 Dec 2023 13:33), Max: 36.9 (13 Dec 2023 00:07)  T(F): 97.2 (13 Dec 2023 13:33), Max: 98.4 (13 Dec 2023 00:07)  HR: 71 (13 Dec 2023 13:33) (71 - 77)  BP: 152/74 (13 Dec 2023 13:33) (131/76 - 167/71)  BP(mean): --  RR: 18 (13 Dec 2023 13:33) (18 - 18)  SpO2: 100% (13 Dec 2023 13:33) (98% - 100%)    Parameters below as of 13 Dec 2023 13:33  Patient On (Oxygen Delivery Method): room air                            10.0   3.94  )-----------( 191      ( 13 Dec 2023 07:11 )             31.9     12-13    136  |  105  |  33<H>  ----------------------------<  130<H>  4.4   |  22  |  1.37<H>    Ca    10.1      13 Dec 2023 07:10  Phos  2.1     12-13  Mg     1.7     12-13        Daily     Daily Weight in k.3 (13 Dec 2023 04:50)    EK/6 Diagnosis Line NORMAL SINUS RHYTHM  LEFT AXIS DEVIATION  CANNOT RULE OUT ANTERIOR INFARCT (CITED ON OR BEFORE 08-AUG-2023)  ABNORMAL ECG  WHEN COMPARED WITH ECG OF 08-AUG-2023 09:53,  NO SIGNIFICANT CHANGE WAS FOUND    CXR:   Type and Screen: ordered for tomorrow         - OR  for above procedure   - NPO past midnight, except medications  - IVF while NPO  - Consent signed and in chart  - Medical clearance for OR    vascular 9009 Surgeon: tarsha  Procedure: LLE Angiography planning for tomorrow     Vital Signs Last 24 Hrs  T(C): 36.2 (13 Dec 2023 13:33), Max: 36.9 (13 Dec 2023 00:07)  T(F): 97.2 (13 Dec 2023 13:33), Max: 98.4 (13 Dec 2023 00:07)  HR: 71 (13 Dec 2023 13:33) (71 - 77)  BP: 152/74 (13 Dec 2023 13:33) (131/76 - 167/71)  BP(mean): --  RR: 18 (13 Dec 2023 13:33) (18 - 18)  SpO2: 100% (13 Dec 2023 13:33) (98% - 100%)    Parameters below as of 13 Dec 2023 13:33  Patient On (Oxygen Delivery Method): room air                            10.0   3.94  )-----------( 191      ( 13 Dec 2023 07:11 )             31.9     12-13    136  |  105  |  33<H>  ----------------------------<  130<H>  4.4   |  22  |  1.37<H>    Ca    10.1      13 Dec 2023 07:10  Phos  2.1     12-13  Mg     1.7     12-13        Daily     Daily Weight in k.3 (13 Dec 2023 04:50)    EK/6 Diagnosis Line NORMAL SINUS RHYTHM  LEFT AXIS DEVIATION  CANNOT RULE OUT ANTERIOR INFARCT (CITED ON OR BEFORE 08-AUG-2023)  ABNORMAL ECG  WHEN COMPARED WITH ECG OF 08-AUG-2023 09:53,  NO SIGNIFICANT CHANGE WAS FOUND    CXR:   Type and Screen: ordered for tomorrow         - OR  for above procedure   - NPO past midnight, except medications  - IVF while NPO  - Consent signed and in chart  - Medical clearance for OR    vascular 9000

## 2023-12-13 NOTE — PROGRESS NOTE ADULT - PROBLEM SELECTOR PLAN 1
VARSHA Malone MD have participated in the daily care of this patient  and have seen and examined the patient today and agree with  the  evaluation, assessment and plan of the surgical house officer  VARSHA Malone MD have personally seen and examined the patient at bedside today at 6 pm

## 2023-12-13 NOTE — PROGRESS NOTE ADULT - SUBJECTIVE AND OBJECTIVE BOX
SUBJECTIVE: Patient seen and examined on AM rounds. Patient reports that they're feeling well. Denies fever, chills. Reports pain as controlled. No complaints at this time.     Vital Signs Last 24 Hrs  T(C): 36.8 (13 Dec 2023 04:50), Max: 37.4 (12 Dec 2023 13:31)  T(F): 98.2 (13 Dec 2023 04:50), Max: 99.3 (12 Dec 2023 13:31)  HR: 73 (13 Dec 2023 04:50) (73 - 86)  BP: 167/71 (13 Dec 2023 04:50) (102/64 - 167/71)  BP(mean): --  RR: 18 (13 Dec 2023 04:50) (18 - 18)  SpO2: 98% (13 Dec 2023 04:50) (97% - 100%)    Parameters below as of 13 Dec 2023 04:50  Patient On (Oxygen Delivery Method): room air        General Appearance: Appears well, NAD  Neck: Supple  Chest: Equal expansion bilaterally  CV: Pulse regular presently  Abdomen: Soft, nontense  Extremities: Bilateral toe wounds/incisions dressed; Bilateral PT/DP signals    I&O's Summary    12 Dec 2023 07:01  -  13 Dec 2023 07:00  --------------------------------------------------------  IN: 550 mL / OUT: 0 mL / NET: 550 mL      I&O's Detail    12 Dec 2023 07:01  -  13 Dec 2023 07:00  --------------------------------------------------------  IN:    Oral Fluid: 550 mL  Total IN: 550 mL    OUT:    Voided (mL): 0 mL  Total OUT: 0 mL    Total NET: 550 mL          LABS:                        10.0   3.94  )-----------( 191      ( 13 Dec 2023 07:11 )             31.9     12-13    136  |  105  |  33<H>  ----------------------------<  130<H>  4.4   |  22  |  1.37<H>    Ca    10.1      13 Dec 2023 07:10  Phos  2.1     12-13  Mg     1.7     12-13        Urinalysis Basic - ( 13 Dec 2023 07:10 )    Color: x / Appearance: x / SG: x / pH: x  Gluc: 130 mg/dL / Ketone: x  / Bili: x / Urobili: x   Blood: x / Protein: x / Nitrite: x   Leuk Esterase: x / RBC: x / WBC x   Sq Epi: x / Non Sq Epi: x / Bacteria: x        RADIOLOGY & ADDITIONAL STUDIES:

## 2023-12-13 NOTE — PROGRESS NOTE ADULT - SUBJECTIVE AND OBJECTIVE BOX
Subjective: Patient seen and examined. No new events except as noted.     SUBJECTIVE/ROS:  No chest pain, dyspnea, palpitation, or dizziness.       MEDICATIONS:  MEDICATIONS  (STANDING):  aspirin enteric coated 81 milliGRAM(s) Oral daily  atorvastatin 40 milliGRAM(s) Oral at bedtime  carvedilol 3.125 milliGRAM(s) Oral every 12 hours  famotidine    Tablet 20 milliGRAM(s) Oral daily  imipenem/cilastatin  IVPB 500 milliGRAM(s) IV Intermittent every 6 hours  imipenem/cilastatin  IVPB      insulin glargine Injectable (LANTUS) 10 Unit(s) SubCutaneous at bedtime  insulin lispro (ADMELOG) corrective regimen sliding scale   SubCutaneous three times a day before meals  insulin lispro Injectable (ADMELOG) 10 Unit(s) SubCutaneous three times a day with meals  predniSONE   Tablet 10 milliGRAM(s) Oral daily  tacrolimus ER Tablet (ENVARSUS XR) 1 milliGRAM(s) Oral <User Schedule>  trimethoprim   80 mG/sulfamethoxazole 400 mG 1 Tablet(s) Oral daily      PHYSICAL EXAM:  T(C): 36.6 (12-13-23 @ 09:02), Max: 37.4 (12-12-23 @ 13:31)  HR: 77 (12-13-23 @ 09:02) (73 - 86)  BP: 158/74 (12-13-23 @ 09:02) (102/64 - 167/71)  RR: 18 (12-13-23 @ 09:02) (18 - 18)  SpO2: 99% (12-13-23 @ 09:02) (98% - 100%)  Wt(kg): --  I&O's Summary    12 Dec 2023 07:01  -  13 Dec 2023 07:00  --------------------------------------------------------  IN: 550 mL / OUT: 0 mL / NET: 550 mL            JVP: Normal  Neck: supple  Lung: clear   CV: S1 S2 , Murmur:  Abd: soft  Ext: No edema  neuro: Awake / alert  Psych: flat affect  Skin: normal``    LABS/DATA:    CARDIAC MARKERS:                                10.0   3.94  )-----------( 191      ( 13 Dec 2023 07:11 )             31.9     12-13    136  |  105  |  33<H>  ----------------------------<  130<H>  4.4   |  22  |  1.37<H>    Ca    10.1      13 Dec 2023 07:10  Phos  2.1     12-13  Mg     1.7     12-13      proBNP:   Lipid Profile:   HgA1c:   TSH:     TELE:  EKG:

## 2023-12-13 NOTE — PROGRESS NOTE ADULT - SUBJECTIVE AND OBJECTIVE BOX
[facility] DIVISION OF KIDNEY DISEASES AND HYPERTENSION -- FOLLOW UP NOTE  --------------------------------------------------------------------------------  Authored by: Nba Aguilar   Cell # [phone]     ROSA CONDE was seen and examined at bedside.   Patient is a 53y old  Male who presents with a chief complaint of c/f osteomyelitis (12-13-23)      24 hour events/subjective: No major events. No pain. No fever. Voiding without difficulty.   Planned for possible angiogram tomorrow.       Standing Inpatient Medications  aspirin enteric coated 81 milliGRAM(s) Oral daily  atorvastatin 40 milliGRAM(s) Oral at bedtime  carvedilol 3.125 milliGRAM(s) Oral every 12 hours  famotidine    Tablet 20 milliGRAM(s) Oral daily  imipenem/cilastatin  IVPB      imipenem/cilastatin  IVPB 500 milliGRAM(s) IV Intermittent every 6 hours  insulin glargine Injectable (LANTUS) 10 Unit(s) SubCutaneous at bedtime  insulin lispro (ADMELOG) corrective regimen sliding scale   SubCutaneous three times a day before meals  insulin lispro Injectable (ADMELOG) 10 Unit(s) SubCutaneous three times a day with meals  predniSONE   Tablet 10 milliGRAM(s) Oral daily  tacrolimus ER Tablet (ENVARSUS XR) 1 milliGRAM(s) Oral <User Schedule>  trimethoprim   80 mG/sulfamethoxazole 400 mG 1 Tablet(s) Oral daily    PRN Inpatient Medications  acetaminophen     Tablet .. 650 milliGRAM(s) Oral every 6 hours PRN  oxycodone    5 mG/acetaminophen 325 mG 1 Tablet(s) Oral every 4 hours PRN        VITALS/PHYSICAL EXAM  --------------------------------------------------------------------------------  T(C): 36.2 (12-13-23 @ 13:33), Max: 36.9 (12-13-23 @ 00:07)  HR: 71 (12-13-23 @ 13:33) (71 - 77)  BP: 152/74 (12-13-23 @ 13:33) (131/76 - 167/71)  RR: 18 (12-13-23 @ 13:33) (18 - 18)  SpO2: 100% (12-13-23 @ 13:33) (98% - 100%)          12-12-23 @ 07:01  -  12-13-23 @ 07:00  --------------------------------------------------------  IN: 550 mL / OUT: 0 mL / NET: 550 mL    12-13-23 @ 07:01  -  12-13-23 @ 16:14  --------------------------------------------------------  IN: 480 mL / OUT: 450 mL / NET: 30 mL      Physical Exam:  Awake and alert   Comfortable   Lungs clear   Abdomen soft and non tender  LE minimal edema  Clean dressing b/l feet         LABS/STUDIES  --------------------------------------------------------------------------------              10.0   3.94  >-----------<  191      [12-13-23 @ 07:11]              31.9     136  |  105  |  33  ----------------------------<  130      [12-13-23 @ 07:10]  4.4   |  22  |  1.37        Ca     10.1     [12-13-23 @ 07:10]      Mg     1.7     [12-13-23 @ 07:10]      Phos  2.1     [12-13-23 @ 07:10]        Creatinine Trend:  SCr 1.37 [12-13 @ 07:10]  SCr 1.54 [12-12 @ 07:00]  SCr 1.30 [12-11 @ 06:24]  SCr 1.32 [12-10 @ 06:30]  SCr 1.21 [12-09 @ 05:45]    Tacrolimus (), Serum: 6.2 ng/mL (12-13 @ 07:11)  Tacrolimus (), Serum: 6.6 ng/mL (12-12 @ 07:06)  Tacrolimus (), Serum: 12.0 ng/mL (12-11 @ 06:24)  Tacrolimus (), Serum: 12.1 ng/mL (12-10 @ 06:30)        CAPILLARY BLOOD GLUCOSE  POCT Blood Glucose.: 197 mg/dL (13 Dec 2023 12:54)  POCT Blood Glucose.: 132 mg/dL (13 Dec 2023 08:34)  POCT Blood Glucose.: 119 mg/dL (12 Dec 2023 21:07)  POCT Blood Glucose.: 147 mg/dL (12 Dec 2023 17:24)      Urinalysis - [12-13-23 @ 07:10]      Color  / Appearance  / SG  / pH       Gluc 130 / Ketone   / Bili  / Urobili        Blood  / Protein  / Leuk Est  / Nitrite       RBC  / WBC  / Hyaline  / Gran  / Sq Epi  / Non Sq Epi  / Bacteria       Iron 68, TIBC 185, %sat 37      [08-16-23 @ 06:39]  Ferritin 1010      [08-16-23 @ 06:39]  PTH -- (Ca 10.6)      [11-02-23 @ 11:56]   220  Vitamin D (25OH) 18.0      [11-02-23 @ 11:56]         [facility] DIVISION OF KIDNEY DISEASES AND HYPERTENSION -- FOLLOW UP NOTE  --------------------------------------------------------------------------------  Authored by: Nba Aguilar   Cell # [phone]     ROSA CONDE was seen and examined at bedside.   Patient is a 53y old  Male who presents with a chief complaint of c/f osteomyelitis (12-13-23)      24 hour events/subjective: No major events. No pain. No fever. Voiding without difficulty.   Planned for possible angiogram tomorrow.       Standing Inpatient Medications  aspirin enteric coated 81 milliGRAM(s) Oral daily  atorvastatin 40 milliGRAM(s) Oral at bedtime  carvedilol 3.125 milliGRAM(s) Oral every 12 hours  famotidine    Tablet 20 milliGRAM(s) Oral daily  imipenem/cilastatin  IVPB      imipenem/cilastatin  IVPB 500 milliGRAM(s) IV Intermittent every 6 hours  insulin glargine Injectable (LANTUS) 10 Unit(s) SubCutaneous at bedtime  insulin lispro (ADMELOG) corrective regimen sliding scale   SubCutaneous three times a day before meals  insulin lispro Injectable (ADMELOG) 10 Unit(s) SubCutaneous three times a day with meals  predniSONE   Tablet 10 milliGRAM(s) Oral daily  tacrolimus ER Tablet (ENVARSUS XR) 1 milliGRAM(s) Oral <User Schedule>  trimethoprim   80 mG/sulfamethoxazole 400 mG 1 Tablet(s) Oral daily    PRN Inpatient Medications  acetaminophen     Tablet .. 650 milliGRAM(s) Oral every 6 hours PRN  oxycodone    5 mG/acetaminophen 325 mG 1 Tablet(s) Oral every 4 hours PRN        VITALS/PHYSICAL EXAM  --------------------------------------------------------------------------------  T(C): 36.2 (12-13-23 @ 13:33), Max: 36.9 (12-13-23 @ 00:07)  HR: 71 (12-13-23 @ 13:33) (71 - 77)  BP: 152/74 (12-13-23 @ 13:33) (131/76 - 167/71)  RR: 18 (12-13-23 @ 13:33) (18 - 18)  SpO2: 100% (12-13-23 @ 13:33) (98% - 100%)          12-12-23 @ 07:01  -  12-13-23 @ 07:00  --------------------------------------------------------  IN: 550 mL / OUT: 0 mL / NET: 550 mL    12-13-23 @ 07:01  -  12-13-23 @ 16:14  --------------------------------------------------------  IN: 480 mL / OUT: 450 mL / NET: 30 mL      Physical Exam:  Awake and alert   Comfortable   Lungs clear   Abdomen soft and non tender  LE minimal edema  Clean dressing b/l feet         LABS/STUDIES  --------------------------------------------------------------------------------              10.0   3.94  >-----------<  191      [12-13-23 @ 07:11]              31.9     136  |  105  |  33  ----------------------------<  130      [12-13-23 @ 07:10]  4.4   |  22  |  1.37        Ca     10.1     [12-13-23 @ 07:10]      Mg     1.7     [12-13-23 @ 07:10]      Phos  2.1     [12-13-23 @ 07:10]        Creatinine Trend:  SCr 1.37 [12-13 @ 07:10]  SCr 1.54 [12-12 @ 07:00]  SCr 1.30 [12-11 @ 06:24]  SCr 1.32 [12-10 @ 06:30]  SCr 1.21 [12-09 @ 05:45]    Tacrolimus (), Serum: 6.2 ng/mL (12-13 @ 07:11)  Tacrolimus (), Serum: 6.6 ng/mL (12-12 @ 07:06)  Tacrolimus (), Serum: 12.0 ng/mL (12-11 @ 06:24)  Tacrolimus (), Serum: 12.1 ng/mL (12-10 @ 06:30)        CAPILLARY BLOOD GLUCOSE  POCT Blood Glucose.: 197 mg/dL (13 Dec 2023 12:54)  POCT Blood Glucose.: 132 mg/dL (13 Dec 2023 08:34)  POCT Blood Glucose.: 119 mg/dL (12 Dec 2023 21:07)  POCT Blood Glucose.: 147 mg/dL (12 Dec 2023 17:24)      Urinalysis - [12-13-23 @ 07:10]      Color  / Appearance  / SG  / pH       Gluc 130 / Ketone   / Bili  / Urobili        Blood  / Protein  / Leuk Est  / Nitrite       RBC  / WBC  / Hyaline  / Gran  / Sq Epi  / Non Sq Epi  / Bacteria       Iron 68, TIBC 185, %sat 37      [08-16-23 @ 06:39]  Ferritin 1010      [08-16-23 @ 06:39]  PTH -- (Ca 10.6)      [11-02-23 @ 11:56]   220  Vitamin D (25OH) 18.0      [11-02-23 @ 11:56]

## 2023-12-14 LAB
ALBUMIN SERPL ELPH-MCNC: 3.3 G/DL — SIGNIFICANT CHANGE UP (ref 3.3–5)
ALBUMIN SERPL ELPH-MCNC: 3.3 G/DL — SIGNIFICANT CHANGE UP (ref 3.3–5)
ALP SERPL-CCNC: 92 U/L — SIGNIFICANT CHANGE UP (ref 40–120)
ALP SERPL-CCNC: 92 U/L — SIGNIFICANT CHANGE UP (ref 40–120)
ALT FLD-CCNC: 6 U/L — LOW (ref 10–45)
ALT FLD-CCNC: 6 U/L — LOW (ref 10–45)
ANION GAP SERPL CALC-SCNC: 11 MMOL/L — SIGNIFICANT CHANGE UP (ref 5–17)
ANION GAP SERPL CALC-SCNC: 11 MMOL/L — SIGNIFICANT CHANGE UP (ref 5–17)
APTT BLD: 102.3 SEC — HIGH (ref 24.5–35.6)
APTT BLD: 102.3 SEC — HIGH (ref 24.5–35.6)
APTT BLD: 103.8 SEC — HIGH (ref 24.5–35.6)
APTT BLD: 103.8 SEC — HIGH (ref 24.5–35.6)
AST SERPL-CCNC: 10 U/L — SIGNIFICANT CHANGE UP (ref 10–40)
AST SERPL-CCNC: 10 U/L — SIGNIFICANT CHANGE UP (ref 10–40)
BASOPHILS # BLD AUTO: 0.04 K/UL — SIGNIFICANT CHANGE UP (ref 0–0.2)
BASOPHILS # BLD AUTO: 0.04 K/UL — SIGNIFICANT CHANGE UP (ref 0–0.2)
BASOPHILS NFR BLD AUTO: 1.4 % — SIGNIFICANT CHANGE UP (ref 0–2)
BASOPHILS NFR BLD AUTO: 1.4 % — SIGNIFICANT CHANGE UP (ref 0–2)
BILIRUB SERPL-MCNC: 0.2 MG/DL — SIGNIFICANT CHANGE UP (ref 0.2–1.2)
BILIRUB SERPL-MCNC: 0.2 MG/DL — SIGNIFICANT CHANGE UP (ref 0.2–1.2)
BLD GP AB SCN SERPL QL: NEGATIVE — SIGNIFICANT CHANGE UP
BLD GP AB SCN SERPL QL: NEGATIVE — SIGNIFICANT CHANGE UP
BUN SERPL-MCNC: 27 MG/DL — HIGH (ref 7–23)
BUN SERPL-MCNC: 27 MG/DL — HIGH (ref 7–23)
CALCIUM SERPL-MCNC: 10.1 MG/DL — SIGNIFICANT CHANGE UP (ref 8.4–10.5)
CALCIUM SERPL-MCNC: 10.1 MG/DL — SIGNIFICANT CHANGE UP (ref 8.4–10.5)
CHLORIDE SERPL-SCNC: 108 MMOL/L — SIGNIFICANT CHANGE UP (ref 96–108)
CHLORIDE SERPL-SCNC: 108 MMOL/L — SIGNIFICANT CHANGE UP (ref 96–108)
CO2 SERPL-SCNC: 21 MMOL/L — LOW (ref 22–31)
CO2 SERPL-SCNC: 21 MMOL/L — LOW (ref 22–31)
CREAT SERPL-MCNC: 1.1 MG/DL — SIGNIFICANT CHANGE UP (ref 0.5–1.3)
CREAT SERPL-MCNC: 1.1 MG/DL — SIGNIFICANT CHANGE UP (ref 0.5–1.3)
EGFR: 81 ML/MIN/1.73M2 — SIGNIFICANT CHANGE UP
EGFR: 81 ML/MIN/1.73M2 — SIGNIFICANT CHANGE UP
EOSINOPHIL # BLD AUTO: 0 K/UL — SIGNIFICANT CHANGE UP (ref 0–0.5)
EOSINOPHIL # BLD AUTO: 0 K/UL — SIGNIFICANT CHANGE UP (ref 0–0.5)
EOSINOPHIL NFR BLD AUTO: 0 % — SIGNIFICANT CHANGE UP (ref 0–6)
EOSINOPHIL NFR BLD AUTO: 0 % — SIGNIFICANT CHANGE UP (ref 0–6)
GLUCOSE BLDC GLUCOMTR-MCNC: 119 MG/DL — HIGH (ref 70–99)
GLUCOSE BLDC GLUCOMTR-MCNC: 119 MG/DL — HIGH (ref 70–99)
GLUCOSE BLDC GLUCOMTR-MCNC: 140 MG/DL — HIGH (ref 70–99)
GLUCOSE BLDC GLUCOMTR-MCNC: 140 MG/DL — HIGH (ref 70–99)
GLUCOSE BLDC GLUCOMTR-MCNC: 142 MG/DL — HIGH (ref 70–99)
GLUCOSE BLDC GLUCOMTR-MCNC: 142 MG/DL — HIGH (ref 70–99)
GLUCOSE BLDC GLUCOMTR-MCNC: 98 MG/DL — SIGNIFICANT CHANGE UP (ref 70–99)
GLUCOSE BLDC GLUCOMTR-MCNC: 98 MG/DL — SIGNIFICANT CHANGE UP (ref 70–99)
GLUCOSE SERPL-MCNC: 122 MG/DL — HIGH (ref 70–99)
GLUCOSE SERPL-MCNC: 122 MG/DL — HIGH (ref 70–99)
HCT VFR BLD CALC: 32.1 % — LOW (ref 39–50)
HCT VFR BLD CALC: 32.1 % — LOW (ref 39–50)
HCT VFR BLD CALC: 32.5 % — LOW (ref 39–50)
HCT VFR BLD CALC: 32.5 % — LOW (ref 39–50)
HGB BLD-MCNC: 10.1 G/DL — LOW (ref 13–17)
HGB BLD-MCNC: 10.1 G/DL — LOW (ref 13–17)
HGB BLD-MCNC: 10.2 G/DL — LOW (ref 13–17)
HGB BLD-MCNC: 10.2 G/DL — LOW (ref 13–17)
IMM GRANULOCYTES NFR BLD AUTO: 3.1 % — HIGH (ref 0–0.9)
IMM GRANULOCYTES NFR BLD AUTO: 3.1 % — HIGH (ref 0–0.9)
INR BLD: 1.16 RATIO — SIGNIFICANT CHANGE UP (ref 0.85–1.18)
INR BLD: 1.16 RATIO — SIGNIFICANT CHANGE UP (ref 0.85–1.18)
LYMPHOCYTES # BLD AUTO: 0.28 K/UL — LOW (ref 1–3.3)
LYMPHOCYTES # BLD AUTO: 0.28 K/UL — LOW (ref 1–3.3)
LYMPHOCYTES # BLD AUTO: 9.7 % — LOW (ref 13–44)
LYMPHOCYTES # BLD AUTO: 9.7 % — LOW (ref 13–44)
MAGNESIUM SERPL-MCNC: 1.8 MG/DL — SIGNIFICANT CHANGE UP (ref 1.6–2.6)
MAGNESIUM SERPL-MCNC: 1.8 MG/DL — SIGNIFICANT CHANGE UP (ref 1.6–2.6)
MCHC RBC-ENTMCNC: 27.6 PG — SIGNIFICANT CHANGE UP (ref 27–34)
MCHC RBC-ENTMCNC: 27.6 PG — SIGNIFICANT CHANGE UP (ref 27–34)
MCHC RBC-ENTMCNC: 28.1 PG — SIGNIFICANT CHANGE UP (ref 27–34)
MCHC RBC-ENTMCNC: 28.1 PG — SIGNIFICANT CHANGE UP (ref 27–34)
MCHC RBC-ENTMCNC: 31.1 GM/DL — LOW (ref 32–36)
MCHC RBC-ENTMCNC: 31.1 GM/DL — LOW (ref 32–36)
MCHC RBC-ENTMCNC: 31.8 GM/DL — LOW (ref 32–36)
MCHC RBC-ENTMCNC: 31.8 GM/DL — LOW (ref 32–36)
MCV RBC AUTO: 88.4 FL — SIGNIFICANT CHANGE UP (ref 80–100)
MCV RBC AUTO: 88.4 FL — SIGNIFICANT CHANGE UP (ref 80–100)
MCV RBC AUTO: 88.8 FL — SIGNIFICANT CHANGE UP (ref 80–100)
MCV RBC AUTO: 88.8 FL — SIGNIFICANT CHANGE UP (ref 80–100)
MONOCYTES # BLD AUTO: 0.54 K/UL — SIGNIFICANT CHANGE UP (ref 0–0.9)
MONOCYTES # BLD AUTO: 0.54 K/UL — SIGNIFICANT CHANGE UP (ref 0–0.9)
MONOCYTES NFR BLD AUTO: 18.6 % — HIGH (ref 2–14)
MONOCYTES NFR BLD AUTO: 18.6 % — HIGH (ref 2–14)
NEUTROPHILS # BLD AUTO: 1.95 K/UL — SIGNIFICANT CHANGE UP (ref 1.8–7.4)
NEUTROPHILS # BLD AUTO: 1.95 K/UL — SIGNIFICANT CHANGE UP (ref 1.8–7.4)
NEUTROPHILS NFR BLD AUTO: 67.2 % — SIGNIFICANT CHANGE UP (ref 43–77)
NEUTROPHILS NFR BLD AUTO: 67.2 % — SIGNIFICANT CHANGE UP (ref 43–77)
NRBC # BLD: 0 /100 WBCS — SIGNIFICANT CHANGE UP (ref 0–0)
PHOSPHATE SERPL-MCNC: 2.2 MG/DL — LOW (ref 2.5–4.5)
PHOSPHATE SERPL-MCNC: 2.2 MG/DL — LOW (ref 2.5–4.5)
PLATELET # BLD AUTO: 193 K/UL — SIGNIFICANT CHANGE UP (ref 150–400)
PLATELET # BLD AUTO: 193 K/UL — SIGNIFICANT CHANGE UP (ref 150–400)
PLATELET # BLD AUTO: 199 K/UL — SIGNIFICANT CHANGE UP (ref 150–400)
PLATELET # BLD AUTO: 199 K/UL — SIGNIFICANT CHANGE UP (ref 150–400)
POTASSIUM SERPL-MCNC: 4.7 MMOL/L — SIGNIFICANT CHANGE UP (ref 3.5–5.3)
POTASSIUM SERPL-MCNC: 4.7 MMOL/L — SIGNIFICANT CHANGE UP (ref 3.5–5.3)
POTASSIUM SERPL-SCNC: 4.7 MMOL/L — SIGNIFICANT CHANGE UP (ref 3.5–5.3)
POTASSIUM SERPL-SCNC: 4.7 MMOL/L — SIGNIFICANT CHANGE UP (ref 3.5–5.3)
PROT SERPL-MCNC: 5.5 G/DL — LOW (ref 6–8.3)
PROT SERPL-MCNC: 5.5 G/DL — LOW (ref 6–8.3)
PROTHROM AB SERPL-ACNC: 12.7 SEC — SIGNIFICANT CHANGE UP (ref 9.5–13)
PROTHROM AB SERPL-ACNC: 12.7 SEC — SIGNIFICANT CHANGE UP (ref 9.5–13)
RBC # BLD: 3.63 M/UL — LOW (ref 4.2–5.8)
RBC # BLD: 3.63 M/UL — LOW (ref 4.2–5.8)
RBC # BLD: 3.66 M/UL — LOW (ref 4.2–5.8)
RBC # BLD: 3.66 M/UL — LOW (ref 4.2–5.8)
RBC # FLD: 12.9 % — SIGNIFICANT CHANGE UP (ref 10.3–14.5)
RBC # FLD: 12.9 % — SIGNIFICANT CHANGE UP (ref 10.3–14.5)
RBC # FLD: 13 % — SIGNIFICANT CHANGE UP (ref 10.3–14.5)
RBC # FLD: 13 % — SIGNIFICANT CHANGE UP (ref 10.3–14.5)
RH IG SCN BLD-IMP: POSITIVE — SIGNIFICANT CHANGE UP
RH IG SCN BLD-IMP: POSITIVE — SIGNIFICANT CHANGE UP
SODIUM SERPL-SCNC: 140 MMOL/L — SIGNIFICANT CHANGE UP (ref 135–145)
SODIUM SERPL-SCNC: 140 MMOL/L — SIGNIFICANT CHANGE UP (ref 135–145)
TACROLIMUS SERPL-MCNC: 3.4 NG/ML — SIGNIFICANT CHANGE UP
TACROLIMUS SERPL-MCNC: 3.4 NG/ML — SIGNIFICANT CHANGE UP
WBC # BLD: 2.9 K/UL — LOW (ref 3.8–10.5)
WBC # BLD: 2.9 K/UL — LOW (ref 3.8–10.5)
WBC # BLD: 3.64 K/UL — LOW (ref 3.8–10.5)
WBC # BLD: 3.64 K/UL — LOW (ref 3.8–10.5)
WBC # FLD AUTO: 2.9 K/UL — LOW (ref 3.8–10.5)
WBC # FLD AUTO: 2.9 K/UL — LOW (ref 3.8–10.5)
WBC # FLD AUTO: 3.64 K/UL — LOW (ref 3.8–10.5)
WBC # FLD AUTO: 3.64 K/UL — LOW (ref 3.8–10.5)

## 2023-12-14 PROCEDURE — 99232 SBSQ HOSP IP/OBS MODERATE 35: CPT

## 2023-12-14 PROCEDURE — 99232 SBSQ HOSP IP/OBS MODERATE 35: CPT | Mod: GC

## 2023-12-14 PROCEDURE — 71045 X-RAY EXAM CHEST 1 VIEW: CPT | Mod: 26

## 2023-12-14 RX ORDER — INSULIN GLARGINE 100 [IU]/ML
5 INJECTION, SOLUTION SUBCUTANEOUS ONCE
Refills: 0 | Status: COMPLETED | OUTPATIENT
Start: 2023-12-14 | End: 2023-12-14

## 2023-12-14 RX ORDER — TACROLIMUS 5 MG/1
2 CAPSULE ORAL
Refills: 0 | Status: DISCONTINUED | OUTPATIENT
Start: 2023-12-15 | End: 2023-12-16

## 2023-12-14 RX ORDER — MAGNESIUM SULFATE 500 MG/ML
2 VIAL (ML) INJECTION ONCE
Refills: 0 | Status: COMPLETED | OUTPATIENT
Start: 2023-12-14 | End: 2023-12-14

## 2023-12-14 RX ORDER — IMIPENEM AND CILASTATIN 250; 250 MG/100ML; MG/100ML
500 INJECTION, POWDER, FOR SOLUTION INTRAVENOUS
Qty: 1 | Refills: 0
Start: 2023-12-14 | End: 2024-01-10

## 2023-12-14 RX ORDER — TACROLIMUS 5 MG/1
1 CAPSULE ORAL ONCE
Refills: 0 | Status: COMPLETED | OUTPATIENT
Start: 2023-12-14 | End: 2023-12-14

## 2023-12-14 RX ORDER — SODIUM,POTASSIUM PHOSPHATES 278-250MG
1 POWDER IN PACKET (EA) ORAL
Refills: 0 | Status: COMPLETED | OUTPATIENT
Start: 2023-12-14 | End: 2023-12-14

## 2023-12-14 RX ORDER — APIXABAN 2.5 MG/1
5 TABLET, FILM COATED ORAL EVERY 12 HOURS
Refills: 0 | Status: DISCONTINUED | OUTPATIENT
Start: 2023-12-14 | End: 2023-12-16

## 2023-12-14 RX ADMIN — FAMOTIDINE 20 MILLIGRAM(S): 10 INJECTION INTRAVENOUS at 12:44

## 2023-12-14 RX ADMIN — IMIPENEM AND CILASTATIN 100 MILLIGRAM(S): 250; 250 INJECTION, POWDER, FOR SOLUTION INTRAVENOUS at 06:47

## 2023-12-14 RX ADMIN — Medication 10 MILLIGRAM(S): at 06:36

## 2023-12-14 RX ADMIN — Medication 1 TABLET(S): at 12:43

## 2023-12-14 RX ADMIN — Medication 25 GRAM(S): at 12:43

## 2023-12-14 RX ADMIN — IMIPENEM AND CILASTATIN 100 MILLIGRAM(S): 250; 250 INJECTION, POWDER, FOR SOLUTION INTRAVENOUS at 18:05

## 2023-12-14 RX ADMIN — Medication 81 MILLIGRAM(S): at 12:44

## 2023-12-14 RX ADMIN — Medication 10 UNIT(S): at 18:08

## 2023-12-14 RX ADMIN — CARVEDILOL PHOSPHATE 3.12 MILLIGRAM(S): 80 CAPSULE, EXTENDED RELEASE ORAL at 18:04

## 2023-12-14 RX ADMIN — IMIPENEM AND CILASTATIN 100 MILLIGRAM(S): 250; 250 INJECTION, POWDER, FOR SOLUTION INTRAVENOUS at 12:44

## 2023-12-14 RX ADMIN — Medication 10 UNIT(S): at 08:52

## 2023-12-14 RX ADMIN — TACROLIMUS 1 MILLIGRAM(S): 5 CAPSULE ORAL at 14:26

## 2023-12-14 RX ADMIN — Medication 1 TABLET(S): at 12:44

## 2023-12-14 RX ADMIN — TACROLIMUS 1 MILLIGRAM(S): 5 CAPSULE ORAL at 08:48

## 2023-12-14 RX ADMIN — HEPARIN SODIUM 1400 UNIT(S)/HR: 5000 INJECTION INTRAVENOUS; SUBCUTANEOUS at 01:53

## 2023-12-14 RX ADMIN — CARVEDILOL PHOSPHATE 3.12 MILLIGRAM(S): 80 CAPSULE, EXTENDED RELEASE ORAL at 06:39

## 2023-12-14 RX ADMIN — Medication 10 UNIT(S): at 12:44

## 2023-12-14 RX ADMIN — Medication 1 TABLET(S): at 18:08

## 2023-12-14 RX ADMIN — ATORVASTATIN CALCIUM 40 MILLIGRAM(S): 80 TABLET, FILM COATED ORAL at 22:07

## 2023-12-14 RX ADMIN — IMIPENEM AND CILASTATIN 100 MILLIGRAM(S): 250; 250 INJECTION, POWDER, FOR SOLUTION INTRAVENOUS at 23:45

## 2023-12-14 RX ADMIN — INSULIN GLARGINE 5 UNIT(S): 100 INJECTION, SOLUTION SUBCUTANEOUS at 22:29

## 2023-12-14 RX ADMIN — HEPARIN SODIUM 1200 UNIT(S)/HR: 5000 INJECTION INTRAVENOUS; SUBCUTANEOUS at 07:18

## 2023-12-14 RX ADMIN — APIXABAN 5 MILLIGRAM(S): 2.5 TABLET, FILM COATED ORAL at 18:08

## 2023-12-14 RX ADMIN — IMIPENEM AND CILASTATIN 100 MILLIGRAM(S): 250; 250 INJECTION, POWDER, FOR SOLUTION INTRAVENOUS at 00:16

## 2023-12-14 NOTE — PROGRESS NOTE ADULT - ASSESSMENT
CAD s/p cabg  stable   asymptomatic   asa, statin    chronic systolic chf  stable  asymptomatic   improved LV function   cont Coreg  off ace/arb/arni for now   s/p ICD    ESRD  s/p transplant   fu with transplant team     PAD  plan for angio  CV stable to proceed

## 2023-12-14 NOTE — PROGRESS NOTE ADULT - SUBJECTIVE AND OBJECTIVE BOX
Patient is a 53y old  Male who presents with a chief complaint of c/f osteomyelitis (14 Dec 2023 08:47)      Vascular Surgery Attending Progress Note    Interval HPI: pt w/o new c/o     Medications:  acetaminophen     Tablet .. 650 milliGRAM(s) Oral every 6 hours PRN  aspirin enteric coated 81 milliGRAM(s) Oral daily  atorvastatin 40 milliGRAM(s) Oral at bedtime  carvedilol 3.125 milliGRAM(s) Oral every 12 hours  famotidine    Tablet 20 milliGRAM(s) Oral daily  heparin   Injectable 7000 Unit(s) IV Push every 6 hours PRN  heparin   Injectable 3500 Unit(s) IV Push every 6 hours PRN  heparin  Infusion.  Unit(s)/Hr IV Continuous <Continuous>  imipenem/cilastatin  IVPB      imipenem/cilastatin  IVPB 500 milliGRAM(s) IV Intermittent every 6 hours  insulin glargine Injectable (LANTUS) 10 Unit(s) SubCutaneous at bedtime  insulin lispro (ADMELOG) corrective regimen sliding scale   SubCutaneous three times a day before meals  insulin lispro Injectable (ADMELOG) 10 Unit(s) SubCutaneous three times a day with meals  magnesium sulfate  IVPB 2 Gram(s) IV Intermittent once  oxycodone    5 mG/acetaminophen 325 mG 1 Tablet(s) Oral every 4 hours PRN  potassium phosphate / sodium phosphate Tablet (K-PHOS No. 2) 1 Tablet(s) Oral two times a day  predniSONE   Tablet 10 milliGRAM(s) Oral daily  sodium chloride 0.9%. 1000 milliLiter(s) IV Continuous <Continuous>  tacrolimus ER Tablet (ENVARSUS XR) 1 milliGRAM(s) Oral <User Schedule>  trimethoprim   80 mG/sulfamethoxazole 400 mG 1 Tablet(s) Oral daily      Vital Signs Last 24 Hrs  T(C): 36.8 (14 Dec 2023 08:50), Max: 36.9 (14 Dec 2023 04:49)  T(F): 98.2 (14 Dec 2023 08:50), Max: 98.4 (14 Dec 2023 04:49)  HR: 72 (14 Dec 2023 08:50) (69 - 76)  BP: 155/67 (14 Dec 2023 08:50) (122/72 - 156/77)  BP(mean): --  RR: 18 (14 Dec 2023 08:50) (18 - 18)  SpO2: 98% (14 Dec 2023 08:50) (98% - 100%)    Parameters below as of 14 Dec 2023 08:50  Patient On (Oxygen Delivery Method): room air      I&O's Summary    13 Dec 2023 07:01  -  14 Dec 2023 07:00  --------------------------------------------------------  IN: 720 mL / OUT: 1175 mL / NET: -455 mL    14 Dec 2023 07:01  -  14 Dec 2023 11:08  --------------------------------------------------------  IN: 0 mL / OUT: 300 mL / NET: -300 mL        Physical Exam:  Neuro  A&Ox3 VSS  Vascular:  callie foot dressing c/d/i    LABS:                        10.1   2.90  )-----------( 193      ( 14 Dec 2023 05:00 )             32.5     12-14    140  |  108  |  27<H>  ----------------------------<  122<H>  4.7   |  21<L>  |  1.10    Ca    10.1      14 Dec 2023 05:00  Phos  2.2     12-14  Mg     1.8     12-14    TPro  5.5<L>  /  Alb  3.3  /  TBili  0.2  /  DBili  x   /  AST  10  /  ALT  6<L>  /  AlkPhos  92  12-14    PT/INR - ( 14 Dec 2023 05:00 )   PT: 12.7 sec;   INR: 1.16 ratio         PTT - ( 14 Dec 2023 05:00 )  PTT:102.3 sec    STEVE YDER MD  [phone] Cell [phone] Patient is a 53y old  Male who presents with a chief complaint of c/f osteomyelitis (14 Dec 2023 08:47)      Vascular Surgery Attending Progress Note    Interval HPI: pt w/o new c/o     Medications:  acetaminophen     Tablet .. 650 milliGRAM(s) Oral every 6 hours PRN  aspirin enteric coated 81 milliGRAM(s) Oral daily  atorvastatin 40 milliGRAM(s) Oral at bedtime  carvedilol 3.125 milliGRAM(s) Oral every 12 hours  famotidine    Tablet 20 milliGRAM(s) Oral daily  heparin   Injectable 7000 Unit(s) IV Push every 6 hours PRN  heparin   Injectable 3500 Unit(s) IV Push every 6 hours PRN  heparin  Infusion.  Unit(s)/Hr IV Continuous <Continuous>  imipenem/cilastatin  IVPB      imipenem/cilastatin  IVPB 500 milliGRAM(s) IV Intermittent every 6 hours  insulin glargine Injectable (LANTUS) 10 Unit(s) SubCutaneous at bedtime  insulin lispro (ADMELOG) corrective regimen sliding scale   SubCutaneous three times a day before meals  insulin lispro Injectable (ADMELOG) 10 Unit(s) SubCutaneous three times a day with meals  magnesium sulfate  IVPB 2 Gram(s) IV Intermittent once  oxycodone    5 mG/acetaminophen 325 mG 1 Tablet(s) Oral every 4 hours PRN  potassium phosphate / sodium phosphate Tablet (K-PHOS No. 2) 1 Tablet(s) Oral two times a day  predniSONE   Tablet 10 milliGRAM(s) Oral daily  sodium chloride 0.9%. 1000 milliLiter(s) IV Continuous <Continuous>  tacrolimus ER Tablet (ENVARSUS XR) 1 milliGRAM(s) Oral <User Schedule>  trimethoprim   80 mG/sulfamethoxazole 400 mG 1 Tablet(s) Oral daily      Vital Signs Last 24 Hrs  T(C): 36.8 (14 Dec 2023 08:50), Max: 36.9 (14 Dec 2023 04:49)  T(F): 98.2 (14 Dec 2023 08:50), Max: 98.4 (14 Dec 2023 04:49)  HR: 72 (14 Dec 2023 08:50) (69 - 76)  BP: 155/67 (14 Dec 2023 08:50) (122/72 - 156/77)  BP(mean): --  RR: 18 (14 Dec 2023 08:50) (18 - 18)  SpO2: 98% (14 Dec 2023 08:50) (98% - 100%)    Parameters below as of 14 Dec 2023 08:50  Patient On (Oxygen Delivery Method): room air      I&O's Summary    13 Dec 2023 07:01  -  14 Dec 2023 07:00  --------------------------------------------------------  IN: 720 mL / OUT: 1175 mL / NET: -455 mL    14 Dec 2023 07:01  -  14 Dec 2023 11:08  --------------------------------------------------------  IN: 0 mL / OUT: 300 mL / NET: -300 mL        Physical Exam:  Neuro  A&Ox3 VSS  Vascular:  callie foot dressing c/d/i    LABS:                        10.1   2.90  )-----------( 193      ( 14 Dec 2023 05:00 )             32.5     12-14    140  |  108  |  27<H>  ----------------------------<  122<H>  4.7   |  21<L>  |  1.10    Ca    10.1      14 Dec 2023 05:00  Phos  2.2     12-14  Mg     1.8     12-14    TPro  5.5<L>  /  Alb  3.3  /  TBili  0.2  /  DBili  x   /  AST  10  /  ALT  6<L>  /  AlkPhos  92  12-14    PT/INR - ( 14 Dec 2023 05:00 )   PT: 12.7 sec;   INR: 1.16 ratio         PTT - ( 14 Dec 2023 05:00 )  PTT:102.3 sec    STEVE DYER MD  [phone] Cell [phone]

## 2023-12-14 NOTE — PROGRESS NOTE ADULT - ASSESSMENT
ASSESSMENT: 54 y/o M with PMH of HTN, CHF (s/p AICD 2016), CAD (s/p CABG 2015), IDDM, PVD s/p stent x4 in RLE with R big toe/second toe amputation (2021) and ESRD on HD via LUE AVF for 6 years now s/p DDRT 7/16/2023 presenting to the ED with infected R 3rd toe and left 1st metatarsal ulcers. Patient follows with Dr. Malone of Vascular Surgery. Patient states he has had these wounds for a while, follows with Podiatry. However, states that over the past few weeks, they have begun to look worse, specifically the left 1st metatarsal ulcer. Vascular surgery consulted for evaluation.    - d/w podiatry attending  rt toe amp and left foot  skin graft healing  w good uptake  abx rx for left foot OM as per ID and podiatry  Please preop for angio tomorrow - AM labs, NPO at midnight active T&S  will follow  ASSESSMENT: 52 y/o M with PMH of HTN, CHF (s/p AICD 2016), CAD (s/p CABG 2015), IDDM, PVD s/p stent x4 in RLE with R big toe/second toe amputation (2021) and ESRD on HD via LUE AVF for 6 years now s/p DDRT 7/16/2023 presenting to the ED with infected R 3rd toe and left 1st metatarsal ulcers. Patient follows with Dr. Malone of Vascular Surgery. Patient states he has had these wounds for a while, follows with Podiatry. However, states that over the past few weeks, they have begun to look worse, specifically the left 1st metatarsal ulcer. Vascular surgery consulted for evaluation.    - d/w podiatry attending  rt toe amp and left foot  skin graft healing  w good uptake  abx rx for left foot OM as per ID and podiatry  Please preop for angio tomorrow - AM labs, NPO at midnight active T&S  will follow  ASSESSMENT: 52 y/o M with PMH of HTN, CHF (s/p AICD 2016), CAD (s/p CABG 2015), IDDM, PVD s/p stent x4 in RLE with R big toe/second toe amputation (2021) and ESRD on HD via LUE AVF for 6 years now s/p DDRT 7/16/2023 presenting to the ED with infected R 3rd toe and left 1st metatarsal ulcers. Patient follows with Dr. Malone of Vascular Surgery. Patient states he has had these wounds for a while, follows with Podiatry. However, states that over the past few weeks, they have begun to look worse, specifically the left 1st metatarsal ulcer. Vascular surgery consulted for evaluation.    - d/w podiatry attending  rt toe amp and left foot  skin graft healing  w good uptake  abx rx for left foot OM as per ID and podiatry  - Defer angiogram, continue LWC  will follow  ASSESSMENT: 54 y/o M with PMH of HTN, CHF (s/p AICD 2016), CAD (s/p CABG 2015), IDDM, PVD s/p stent x4 in RLE with R big toe/second toe amputation (2021) and ESRD on HD via LUE AVF for 6 years now s/p DDRT 7/16/2023 presenting to the ED with infected R 3rd toe and left 1st metatarsal ulcers. Patient follows with Dr. Malone of Vascular Surgery. Patient states he has had these wounds for a while, follows with Podiatry. However, states that over the past few weeks, they have begun to look worse, specifically the left 1st metatarsal ulcer. Vascular surgery consulted for evaluation.    - d/w podiatry attending  rt toe amp and left foot  skin graft healing  w good uptake  abx rx for left foot OM as per ID and podiatry  - Defer angiogram, continue LWC  will follow

## 2023-12-14 NOTE — PROVIDER CONTACT NOTE (CRITICAL VALUE NOTIFICATION) - SITUATION
TISSUE CX FROM 12/11/23 rare kleb PNAE ESBL Few dantia epiderm TISSUE CX FROM 12/11/23 rare kleb PNAE ESBL Few danita epiderm

## 2023-12-14 NOTE — PROGRESS NOTE ADULT - SUBJECTIVE AND OBJECTIVE BOX
Transplant Surgery - Multidisciplinary Progress Note  --------------------------------------------------------------  DDRT 7/16/2023    Present: Patient seen and examined with multidisciplinary team including Transplant Surgeon:  Dr. Vizcaino. Transplant Nephrologist: Dr. Aguilar,  Transplant Pharmacist: STEPHANY Reynoso and unit RN during am rounds.  Disciplines not in attendance will be notified of the plan.     HPI:  52 y/o M with past medical history significant for HTN, CHF (s/p AICD 2016--last interrogated 7/15/23), CAD (s/p CABG 2015), IDDM, PVD s/p stent x4 in RLE with R big toe/second toe amputation (2021) and ESRD on HD via LUE AVF for 6 years now s/p DDRT 7/16/2023 with Thymoglobulin induction. Post-transplant course complicated by severe constipation requiring disimpaction, DGF requiring hemodialysis and LUE swelling with concern for L subclavian thrombus on Eliquis, now s/p IR fistulogram 7/7/23 with balloon angioplasty of subclavian vein. He was discharged home on 7/27/23     Presented to [facility] ED from wound care clinic on 12/6/23 after concern for osteomyelitis from clinic providers.     Interval Events:  - Afebrile, VSS  - OR w/podiatry 12/11: R partial 3rd toe amp, R foot I&D of necrotic tissue, L foot application of kerecis graft.   - Cx: klebsiella/staph epidermidis/enterococcus  - NPO for angiogram with vascular surgery, on heparin drip  - no overnight events      Immunosuppression:  Maintenance Immuno: Envarsus by level, MMF held, Pred 10mg    Potential Discharge date: TBD  Plan of care: see below          MEDICATIONS  (STANDING):  apixaban 5 milliGRAM(s) Oral every 12 hours  aspirin enteric coated 81 milliGRAM(s) Oral daily  atorvastatin 40 milliGRAM(s) Oral at bedtime  carvedilol 3.125 milliGRAM(s) Oral every 12 hours  famotidine    Tablet 20 milliGRAM(s) Oral daily  imipenem/cilastatin  IVPB      imipenem/cilastatin  IVPB 500 milliGRAM(s) IV Intermittent every 6 hours  insulin glargine Injectable (LANTUS) 10 Unit(s) SubCutaneous at bedtime  insulin lispro (ADMELOG) corrective regimen sliding scale   SubCutaneous three times a day before meals  insulin lispro Injectable (ADMELOG) 10 Unit(s) SubCutaneous three times a day with meals  potassium phosphate / sodium phosphate Tablet (K-PHOS No. 2) 1 Tablet(s) Oral two times a day  predniSONE   Tablet 10 milliGRAM(s) Oral daily  trimethoprim   80 mG/sulfamethoxazole 400 mG 1 Tablet(s) Oral daily    MEDICATIONS  (PRN):  acetaminophen     Tablet .. 650 milliGRAM(s) Oral every 6 hours PRN Mild Pain (1 - 3)  oxycodone    5 mG/acetaminophen 325 mG 1 Tablet(s) Oral every 4 hours PRN Moderate Pain (4 - 6)      PAST MEDICAL & SURGICAL HISTORY:  Diabetes mellitus  type 2      Foot ulcer due to secondary DM      Coronary artery disease      Acute on chronic systolic heart failure      H/O kidney transplant      Breast Reduction  at age 18      Toe amputation status, left      S/P CABG (coronary artery bypass graft)      AICD (automatic cardioverter/defibrillator) present          Vital Signs Last 24 Hrs  T(C): 36.8 (14 Dec 2023 13:05), Max: 36.9 (14 Dec 2023 04:49)  T(F): 98.2 (14 Dec 2023 13:05), Max: 98.4 (14 Dec 2023 04:49)  HR: 81 (14 Dec 2023 13:05) (69 - 81)  BP: 154/71 (14 Dec 2023 13:05) (122/72 - 156/77)  BP(mean): --  RR: 18 (14 Dec 2023 13:05) (18 - 18)  SpO2: 100% (14 Dec 2023 13:05) (98% - 100%)    Parameters below as of 14 Dec 2023 13:05  Patient On (Oxygen Delivery Method): room air        I&O's Summary    13 Dec 2023 07:01  -  14 Dec 2023 07:00  --------------------------------------------------------  IN: 720 mL / OUT: 1175 mL / NET: -455 mL    14 Dec 2023 07:01  -  14 Dec 2023 16:48  --------------------------------------------------------  IN: 200 mL / OUT: 300 mL / NET: -100 mL                              10.1   2.90  )-----------( 193      ( 14 Dec 2023 05:00 )             32.5     12-14    140  |  108  |  27<H>  ----------------------------<  122<H>  4.7   |  21<L>  |  1.10    Ca    10.1      14 Dec 2023 05:00  Phos  2.2     12-14  Mg     1.8     12-14    TPro  5.5<L>  /  Alb  3.3  /  TBili  0.2  /  DBili  x   /  AST  10  /  ALT  6<L>  /  AlkPhos  92  12-14    Tacrolimus (), Serum: 3.4 ng/mL (12-14 @ 05:00)        Culture - Tissue with Gram Stain (collected 12-11-23 @ 21:16)  Source: .Tissue Other  Gram Stain (12-12-23 @ 01:58):    Numerous polymorphonuclear leukocytes seen per low power field    No organisms seen per oil power field  Preliminary Report (12-12-23 @ 20:46):    No growth    Culture - Tissue with Gram Stain (collected 12-11-23 @ 21:16)  Source: .Tissue Other  Gram Stain (12-12-23 @ 22:53):    No polymorphonuclear cells seen per low power field    Rare Gram Variable Coccobacilli seen per oil power field  Preliminary Report (12-13-23 @ 21:35):    Rare Klebsiella pneumoniae ESBL    Few Staphylococcus epidermidis  Organism: Klebsiella pneumoniae ESBL  Staphylococcus epidermidis (12-13-23 @ 21:32)  Organism: Staphylococcus epidermidis (12-13-23 @ 21:32)  Organism: Klebsiella pneumoniae ESBL (12-13-23 @ 21:26)      Review of systems  All other systems were reviewed and are negative, except as noted.     PHYSICAL EXAM:  Constitutional: Well developed / well nourished  Eyes: Anicteric, PERRLA  ENMT: nc/at  Neck: supple  Respiratory: CTA B/L  Cardiovascular: RRR  Gastrointestinal: Soft, ND/NT   Genitourinary: Voiding spontaneously  Extremities: SCD's in place and working bilaterally  Vascular: Palpable dp pulses bilaterally, b/l LE foot wound dressing in place  Neurological: A&O x3  Skin: no rashes, ulcerations or lesions other than above   Musculoskeletal: Moving all extremities  Psychiatric: Responsive Transplant Surgery - Multidisciplinary Progress Note  --------------------------------------------------------------  DDRT 7/16/2023    Present: Patient seen and examined with multidisciplinary team including Transplant Surgeon:  Dr. Vizcaino. Transplant Nephrologist: Dr. Aguilar,  Transplant Pharmacist: STEPHANY Reynoso and unit RN during am rounds.  Disciplines not in attendance will be notified of the plan.     HPI:  54 y/o M with past medical history significant for HTN, CHF (s/p AICD 2016--last interrogated 7/15/23), CAD (s/p CABG 2015), IDDM, PVD s/p stent x4 in RLE with R big toe/second toe amputation (2021) and ESRD on HD via LUE AVF for 6 years now s/p DDRT 7/16/2023 with Thymoglobulin induction. Post-transplant course complicated by severe constipation requiring disimpaction, DGF requiring hemodialysis and LUE swelling with concern for L subclavian thrombus on Eliquis, now s/p IR fistulogram 7/7/23 with balloon angioplasty of subclavian vein. He was discharged home on 7/27/23     Presented to [facility] ED from wound care clinic on 12/6/23 after concern for osteomyelitis from clinic providers.     Interval Events:  - Afebrile, VSS  - OR w/podiatry 12/11: R partial 3rd toe amp, R foot I&D of necrotic tissue, L foot application of kerecis graft.   - Cx: klebsiella/staph epidermidis/enterococcus  - NPO for angiogram with vascular surgery, on heparin drip  - no overnight events      Immunosuppression:  Maintenance Immuno: Envarsus by level, MMF held, Pred 10mg    Potential Discharge date: TBD  Plan of care: see below          MEDICATIONS  (STANDING):  apixaban 5 milliGRAM(s) Oral every 12 hours  aspirin enteric coated 81 milliGRAM(s) Oral daily  atorvastatin 40 milliGRAM(s) Oral at bedtime  carvedilol 3.125 milliGRAM(s) Oral every 12 hours  famotidine    Tablet 20 milliGRAM(s) Oral daily  imipenem/cilastatin  IVPB      imipenem/cilastatin  IVPB 500 milliGRAM(s) IV Intermittent every 6 hours  insulin glargine Injectable (LANTUS) 10 Unit(s) SubCutaneous at bedtime  insulin lispro (ADMELOG) corrective regimen sliding scale   SubCutaneous three times a day before meals  insulin lispro Injectable (ADMELOG) 10 Unit(s) SubCutaneous three times a day with meals  potassium phosphate / sodium phosphate Tablet (K-PHOS No. 2) 1 Tablet(s) Oral two times a day  predniSONE   Tablet 10 milliGRAM(s) Oral daily  trimethoprim   80 mG/sulfamethoxazole 400 mG 1 Tablet(s) Oral daily    MEDICATIONS  (PRN):  acetaminophen     Tablet .. 650 milliGRAM(s) Oral every 6 hours PRN Mild Pain (1 - 3)  oxycodone    5 mG/acetaminophen 325 mG 1 Tablet(s) Oral every 4 hours PRN Moderate Pain (4 - 6)      PAST MEDICAL & SURGICAL HISTORY:  Diabetes mellitus  type 2      Foot ulcer due to secondary DM      Coronary artery disease      Acute on chronic systolic heart failure      H/O kidney transplant      Breast Reduction  at age 18      Toe amputation status, left      S/P CABG (coronary artery bypass graft)      AICD (automatic cardioverter/defibrillator) present          Vital Signs Last 24 Hrs  T(C): 36.8 (14 Dec 2023 13:05), Max: 36.9 (14 Dec 2023 04:49)  T(F): 98.2 (14 Dec 2023 13:05), Max: 98.4 (14 Dec 2023 04:49)  HR: 81 (14 Dec 2023 13:05) (69 - 81)  BP: 154/71 (14 Dec 2023 13:05) (122/72 - 156/77)  BP(mean): --  RR: 18 (14 Dec 2023 13:05) (18 - 18)  SpO2: 100% (14 Dec 2023 13:05) (98% - 100%)    Parameters below as of 14 Dec 2023 13:05  Patient On (Oxygen Delivery Method): room air        I&O's Summary    13 Dec 2023 07:01  -  14 Dec 2023 07:00  --------------------------------------------------------  IN: 720 mL / OUT: 1175 mL / NET: -455 mL    14 Dec 2023 07:01  -  14 Dec 2023 16:48  --------------------------------------------------------  IN: 200 mL / OUT: 300 mL / NET: -100 mL                              10.1   2.90  )-----------( 193      ( 14 Dec 2023 05:00 )             32.5     12-14    140  |  108  |  27<H>  ----------------------------<  122<H>  4.7   |  21<L>  |  1.10    Ca    10.1      14 Dec 2023 05:00  Phos  2.2     12-14  Mg     1.8     12-14    TPro  5.5<L>  /  Alb  3.3  /  TBili  0.2  /  DBili  x   /  AST  10  /  ALT  6<L>  /  AlkPhos  92  12-14    Tacrolimus (), Serum: 3.4 ng/mL (12-14 @ 05:00)        Culture - Tissue with Gram Stain (collected 12-11-23 @ 21:16)  Source: .Tissue Other  Gram Stain (12-12-23 @ 01:58):    Numerous polymorphonuclear leukocytes seen per low power field    No organisms seen per oil power field  Preliminary Report (12-12-23 @ 20:46):    No growth    Culture - Tissue with Gram Stain (collected 12-11-23 @ 21:16)  Source: .Tissue Other  Gram Stain (12-12-23 @ 22:53):    No polymorphonuclear cells seen per low power field    Rare Gram Variable Coccobacilli seen per oil power field  Preliminary Report (12-13-23 @ 21:35):    Rare Klebsiella pneumoniae ESBL    Few Staphylococcus epidermidis  Organism: Klebsiella pneumoniae ESBL  Staphylococcus epidermidis (12-13-23 @ 21:32)  Organism: Staphylococcus epidermidis (12-13-23 @ 21:32)  Organism: Klebsiella pneumoniae ESBL (12-13-23 @ 21:26)      Review of systems  All other systems were reviewed and are negative, except as noted.     PHYSICAL EXAM:  Constitutional: Well developed / well nourished  Eyes: Anicteric, PERRLA  ENMT: nc/at  Neck: supple  Respiratory: CTA B/L  Cardiovascular: RRR  Gastrointestinal: Soft, ND/NT   Genitourinary: Voiding spontaneously  Extremities: SCD's in place and working bilaterally  Vascular: Palpable dp pulses bilaterally, b/l LE foot wound dressing in place  Neurological: A&O x3  Skin: no rashes, ulcerations or lesions other than above   Musculoskeletal: Moving all extremities  Psychiatric: Responsive

## 2023-12-14 NOTE — PROGRESS NOTE ADULT - SUBJECTIVE AND OBJECTIVE BOX
Vascular Surgery Progress Note  Patient is a 53y old  Male who presents with a chief complaint of c/f osteomyelitis (14 Dec 2023 11:35)      INTERVAL EVENTS: No acute events overnight.  SUBJECTIVE: Patient seen and examined at bedside with surgical team, patient without complaints. Denies fever, chills, CP, SOB nausea, vomiting, abdominal pain.      OBJECTIVE:    Vital Signs Last 24 Hrs  T(C): 36.8 (14 Dec 2023 08:50), Max: 36.9 (14 Dec 2023 04:49)  T(F): 98.2 (14 Dec 2023 08:50), Max: 98.4 (14 Dec 2023 04:49)  HR: 72 (14 Dec 2023 08:50) (69 - 76)  BP: 155/67 (14 Dec 2023 08:50) (122/72 - 156/77)  BP(mean): --  RR: 18 (14 Dec 2023 08:50) (18 - 18)  SpO2: 98% (14 Dec 2023 08:50) (98% - 100%)    Parameters below as of 14 Dec 2023 08:50  Patient On (Oxygen Delivery Method): room air    I&O's Detail    13 Dec 2023 07:01  -  14 Dec 2023 07:00  --------------------------------------------------------  IN:    Oral Fluid: 720 mL  Total IN: 720 mL    OUT:    Voided (mL): 1175 mL  Total OUT: 1175 mL    Total NET: -455 mL      14 Dec 2023 07:01  -  14 Dec 2023 12:01  --------------------------------------------------------  IN:  Total IN: 0 mL    OUT:    Voided (mL): 300 mL  Total OUT: 300 mL    Total NET: -300 mL      MEDICATIONS  (STANDING):  apixaban 5 milliGRAM(s) Oral every 12 hours  aspirin enteric coated 81 milliGRAM(s) Oral daily  atorvastatin 40 milliGRAM(s) Oral at bedtime  carvedilol 3.125 milliGRAM(s) Oral every 12 hours  famotidine    Tablet 20 milliGRAM(s) Oral daily  imipenem/cilastatin  IVPB      imipenem/cilastatin  IVPB 500 milliGRAM(s) IV Intermittent every 6 hours  insulin glargine Injectable (LANTUS) 10 Unit(s) SubCutaneous at bedtime  insulin lispro (ADMELOG) corrective regimen sliding scale   SubCutaneous three times a day before meals  insulin lispro Injectable (ADMELOG) 10 Unit(s) SubCutaneous three times a day with meals  magnesium sulfate  IVPB 2 Gram(s) IV Intermittent once  potassium phosphate / sodium phosphate Tablet (K-PHOS No. 2) 1 Tablet(s) Oral two times a day  predniSONE   Tablet 10 milliGRAM(s) Oral daily  sodium chloride 0.9%. 1000 milliLiter(s) (75 mL/Hr) IV Continuous <Continuous>  tacrolimus ER Tablet (ENVARSUS XR) 1 milliGRAM(s) Oral <User Schedule>  trimethoprim   80 mG/sulfamethoxazole 400 mG 1 Tablet(s) Oral daily    MEDICATIONS  (PRN):  acetaminophen     Tablet .. 650 milliGRAM(s) Oral every 6 hours PRN Mild Pain (1 - 3)  oxycodone    5 mG/acetaminophen 325 mG 1 Tablet(s) Oral every 4 hours PRN Moderate Pain (4 - 6)      PHYSICAL EXAM:    Neuro  A&Ox3 VSS  Vascular:  callie foot dressing c/d/i    LABS:                        10.1   2.90  )-----------( 193      ( 14 Dec 2023 05:00 )             32.5     12-14    140  |  108  |  27<H>  ----------------------------<  122<H>  4.7   |  21<L>  |  1.10    Ca    10.1      14 Dec 2023 05:00  Phos  2.2     12-14  Mg     1.8     12-14    TPro  5.5<L>  /  Alb  3.3  /  TBili  0.2  /  DBili  x   /  AST  10  /  ALT  6<L>  /  AlkPhos  92  12-14    PT/INR - ( 14 Dec 2023 05:00 )   PT: 12.7 sec;   INR: 1.16 ratio         PTT - ( 14 Dec 2023 05:00 )  PTT:102.3 sec  LIVER FUNCTIONS - ( 14 Dec 2023 05:00 )  Alb: 3.3 g/dL / Pro: 5.5 g/dL / ALK PHOS: 92 U/L / ALT: 6 U/L / AST: 10 U/L / GGT: x           Urinalysis Basic - ( 14 Dec 2023 05:00 )    Color: x / Appearance: x / SG: x / pH: x  Gluc: 122 mg/dL / Ketone: x  / Bili: x / Urobili: x   Blood: x / Protein: x / Nitrite: x   Leuk Esterase: x / RBC: x / WBC x   Sq Epi: x / Non Sq Epi: x / Bacteria: x      ABO Interpretation: A (12-14-23 @ 04:58)      IMAGING:

## 2023-12-14 NOTE — PROVIDER CONTACT NOTE (CRITICAL VALUE NOTIFICATION) - TEST AND RESULT REPORTED:
tissue cx 12/11 prelim rare klebsiella PNA, few staph epidermis
tissue cx from 12/11/23
Numerous Klebsiella pneumoniae  Numerous Pseudomonas aeruginosa  L foot abcess  Numerous Enterococcus faecalis
foot abcesss Culture results from 12/6

## 2023-12-14 NOTE — PROGRESS NOTE ADULT - SUBJECTIVE AND OBJECTIVE BOX
Subjective: Patient seen and examined. No new events except as noted.     SUBJECTIVE/ROS:  plan for angio      MEDICATIONS:  MEDICATIONS  (STANDING):  aspirin enteric coated 81 milliGRAM(s) Oral daily  atorvastatin 40 milliGRAM(s) Oral at bedtime  carvedilol 3.125 milliGRAM(s) Oral every 12 hours  famotidine    Tablet 20 milliGRAM(s) Oral daily  heparin  Infusion.  Unit(s)/Hr (16 mL/Hr) IV Continuous <Continuous>  imipenem/cilastatin  IVPB      imipenem/cilastatin  IVPB 500 milliGRAM(s) IV Intermittent every 6 hours  insulin glargine Injectable (LANTUS) 10 Unit(s) SubCutaneous at bedtime  insulin lispro (ADMELOG) corrective regimen sliding scale   SubCutaneous three times a day before meals  insulin lispro Injectable (ADMELOG) 10 Unit(s) SubCutaneous three times a day with meals  predniSONE   Tablet 10 milliGRAM(s) Oral daily  sodium chloride 0.9%. 1000 milliLiter(s) (75 mL/Hr) IV Continuous <Continuous>  tacrolimus ER Tablet (ENVARSUS XR) 1 milliGRAM(s) Oral <User Schedule>  trimethoprim   80 mG/sulfamethoxazole 400 mG 1 Tablet(s) Oral daily      PHYSICAL EXAM:  T(C): 36.9 (12-14-23 @ 04:49), Max: 36.9 (12-14-23 @ 04:49)  HR: 76 (12-14-23 @ 04:49) (69 - 77)  BP: 122/72 (12-14-23 @ 04:49) (122/72 - 158/74)  RR: 18 (12-14-23 @ 04:49) (18 - 18)  SpO2: 100% (12-14-23 @ 04:49) (98% - 100%)  Wt(kg): --  I&O's Summary    13 Dec 2023 07:01  -  14 Dec 2023 07:00  --------------------------------------------------------  IN: 720 mL / OUT: 1175 mL / NET: -455 mL            JVP: Normal  Neck: supple  Lung: clear   CV: S1 S2 , Murmur:  Abd: soft  Ext: No edema  neuro: Awake / alert  Psych: flat affect  Skin: normal``    LABS/DATA:    CARDIAC MARKERS:                                10.1   2.90  )-----------( 193      ( 14 Dec 2023 05:00 )             32.5     12-14    140  |  108  |  27<H>  ----------------------------<  122<H>  4.7   |  21<L>  |  1.10    Ca    10.1      14 Dec 2023 05:00  Phos  2.2     12-14  Mg     1.8     12-14    TPro  5.5<L>  /  Alb  3.3  /  TBili  0.2  /  DBili  x   /  AST  10  /  ALT  6<L>  /  AlkPhos  92  12-14    proBNP:   Lipid Profile:   HgA1c:   TSH:     TELE:  EKG:

## 2023-12-14 NOTE — PROGRESS NOTE ADULT - ASSESSMENT
53M with PMH of HTN, HLD, DM2, CAD s/p CABG, HF s/p AICD, ESRD on HD since 2016 s/p DDRT (7/14/23) complicated by DGF, and toe amputations due to PVD/osteo, now presenting to the ED with concern for toe infection. Nephrology service consulted for management of immunosuppression of transplanted kidney.       1. 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   - Scr now WNL at 1.1 today    2. Immunosuppression medications  - Initially received Simulect induction but changed to Thymoglobulin given delayed graft function.  - On MMF, Envarsus, and Prednisone 10mg (+DSA on 7/15/23) at home  - Recommend holding Cellcept at this time due to osteo  - Tacro level 3.4 today  - Check serum tacrolimus level (trough) 30 mins prior to AM dose.  - Ppx with Bactrim    3. Osteomyelitis   - managed by Podiatry, Vascular, and Transplant ID  - Pt underwent Right foot partial 3rd ray resection and left foot wound debridement on 12/11.   - Will continue on abx for ~6 weeks as per Transplant ID. Pending PICC line placement.   - No plan for angio as per Vascular note.    4. HTN - acceptable on Coreg 3.125mg BID  5. DM2- On lantus and premeal insulin      If you have any questions, please feel free to contact me.  Stuart Franklin MD  Nephrology Fellow  [id number] / [employer] Teams (Preferred)  (After 4pm or on weekends, please call the on-call Fellow) 53M with PMH of HTN, HLD, DM2, CAD s/p CABG, HF s/p AICD, ESRD on HD since 2016 s/p DDRT (7/14/23) complicated by DGF, and toe amputations due to PVD/osteo, now presenting to the ED with concern for toe infection. Nephrology service consulted for management of immunosuppression of transplanted kidney.       1. 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   - Scr now WNL at 1.1 today    2. Immunosuppression medications  - Initially received Simulect induction but changed to Thymoglobulin given delayed graft function.  - On MMF, Envarsus, and Prednisone 10mg (+DSA on 7/15/23) at home  - Recommend holding Cellcept at this time due to osteo  - Tacro level 3.4 today  - Check serum tacrolimus level (trough) 30 mins prior to AM dose.  - Ppx with Bactrim    3. Osteomyelitis   - managed by Podiatry, Vascular, and Transplant ID  - Pt underwent Right foot partial 3rd ray resection and left foot wound debridement on 12/11.   - Will continue on abx for ~6 weeks as per Transplant ID. Pending PICC line placement.   - No plan for angio as per Vascular note.    4. HTN - acceptable on Coreg 3.125mg BID  5. DM2- On lantus and premeal insulin      If you have any questions, please feel free to contact me.  Stuart Franklin MD  Nephrology Fellow  [phone] / [employer] Teams (Preferred)  (After 4pm or on weekends, please call the on-call Fellow)

## 2023-12-14 NOTE — ADVANCED PRACTICE NURSE CONSULT - ASSESSMENT
Central Line Catheter Insertion Note  Patient or patient representative educated about central line associated blood stream infection prevention practices.  Catheter type: 4F,  SL Picc  : Bard  Power injectable: Yes  LOT#[id number]  EXP DATE 2024-09-30    Informed consent obtained by covering floor team.  Procedure assisted by: TRENTON Alvarez RN  Time out was preformed, confirming the patient's first and last name, date of birth, MR #, procedure, and correct site prior to start of procedure.    Patient was placed with HOB 30 degrees. Patient placement site was prepped with chlorhexidine solution, then draped using maximum sterile barrier protection. The area was injected with ml of 1% lidocaine. Using the Bard Site Rite 8, the catheter was placed using the Modified Seldinger Technique. Strict adherence to outline aseptic technique including handwashing, glove and gown, utilizaing mask and cap, patient placed mask, plus draping the patient with a sterile drape was observed. Upon completion of line placement, the insertion site was covered with a sterile CHG dressing. Pt tolerated procedure well.     All materials used for catheter insertion, including the intact guide wires, were accounted for at the end of the procedure.  Number of attempts: 1  Complications/Comments: None    Emergency Placement: No  Site: New   Anatomical Site of insertion: R Brachial  Catheter size/length: 4F,   40cm  US guided Bard single lumen power picc placed    Post procedure verification with chest Xray as per orders.   Central Line Catheter Insertion Note  Patient or patient representative educated about central line associated blood stream infection prevention practices.  Catheter type: 4F,  SL Picc  : Bard  Power injectable: Yes  LOT#[id number]  EXP DATE 2024-09-30    Informed consent obtained by covering floor team.  Procedure assisted by: TRENTON Alvarez RN  Time out was preformed, confirming the patient's first and last name, date of birth, MR #, procedure, and correct site prior to start of procedure.    Patient was placed with HOB 30 degrees. Patient placement site was prepped with chlorhexidine solution, then draped using maximum sterile barrier protection. The area was injected with ml of 1% lidocaine. Using the Bard Site Rite 8, the catheter was placed using the Modified Seldinger Technique. Strict adherence to outline aseptic technique including handwashing, glove and gown, utilizaing mask and cap, patient placed mask, plus draping the patient with a sterile drape was observed. Upon completion of line placement, the insertion site was covered with a sterile CHG dressing. Pt tolerated procedure well.     All materials used for catheter insertion, including the intact guide wires, were accounted for at the end of the procedure.  Number of attempts: 1  Complications/Comments: None    Emergency Placement: No  Site: New   Anatomical Site of insertion: R Brachial  Catheter size/length: 4F,   40cm  US guided Bard single lumen power picc placed    Post procedure verification with chest Xray as per orders.

## 2023-12-14 NOTE — PROGRESS NOTE ADULT - ASSESSMENT
Left foot:    Status post amputation of the fifth metatarsal and proximal aspect of the   fifth proximal phalanx. Cortical irregularity along the fifth distal   phalanx, which may represent osteomyelitis. Correlate clinically for   overlying ulcer. MRI can be completed for further evaluation as   clinically indicated.    No acute displaced fracture or dislocation.  Tee Salvador is a 54 y/o M with past medical history significant for HTN, CHF (s/p AICD 2016), CAD (s/p CABG 2015), IDDM, PVD s/p stent x4 in RLE with R big toe/second toe amputation (2021) and ESRD on HD via LUE AVF for 6 years now s/p DDRT 7/16/2023 with Thymoglobulin induction. Post-transplant course complicated by severe constipation requiring disimpaction, DGF requiring hemodialysis, admission in August with K pneumoniae UTI treated with cefazolin then keflex. He has chronic bilateral foot wounds followed by wound care. PAtient states in the last week he started noticing some redness around the left leg foot ulcer with no pain or discharge. The podiatrist also noted necrosis of the tip of the third right toe. He was sent to ER for iv antibitoics and management. He has not received any antibiotics. He denies fever but felt chills a week back. On admission, afebrile, WBC 2.4.       Xray   Right foot:  Evaluation of the toes is limited due to patient positioning.  Status post amputation of the first ray to the level of the first   metatarsal head. There is minimal cortical irregularity with mild   progressive lucency within the first metatarsal head, which may represent   underlying osteomyelitis. Correlate clinically for overlying ulcer.  Status post amputation of the second toe to the level of the second   proximal phalanx with mild cortical irregularity along the amputation   margin of the second proximal phalanx, which may represent osteomyelitis.   Correlate clinically for overlying ulcer.      1. S/p DDKT 7/16/23 CMV D+/+, EBV D+/R+  Induction ATG  maintenance. tacro, mmf, pred  PPx; valcyte and bactrim  Complications DGF requiring HD    2. Left foot ulcer and cellulitis  3. Right 3d toe necrotic ulcer  4. History of first ray amputation 8/2021 - tissue cx grew E faecalis, Corynebacterium prevotella spp, Lecrercia spp   CXR with concern for OM although not in areas of concern on exam  Vascular studies with small vessel disease.     Left ulcer culture sent ? debrided tossue: Klebsiella pneumoniae, PsA and E faecalis  zosyn/vanc 12/6-    Recommendations  ·	MRi of both feet with OM of the left first metatarsal head and proximal phalynx of first toe and thrid right toe tip  ·	plan is to perform MRA with vascular  ·	Plan also for amputation of R thrid distal toe on 12/11 and debridement of LEft bone tissue  ·	Please send MARGIN LEFT bone tissue bacterial cx, MARGIn histopath from R toe and MARGIN bacterial cultures . Suspect will still need 6 weeks of antibiotics given no plans to amputate L metatarsal/proximal phalanx  ·	ESR, crp weekly  ·	Meanwhile, cultuures from ulcer superficial debridement with ESBl Klebsiella, PSA and E faecalis.      Unless surgical operative cultures grow additional organisms will plan to treat with IV imipenem x 6weeks total therapy  Patient will need PICC line placement and home care services arranged by primary team    He can follow up in the ID office after discharge    Plan discussed with patient    Jackson Moore MD  Can be called via Teams  After 5pm/weekends [phone]           Left foot:    Status post amputation of the fifth metatarsal and proximal aspect of the   fifth proximal phalanx. Cortical irregularity along the fifth distal   phalanx, which may represent osteomyelitis. Correlate clinically for   overlying ulcer. MRI can be completed for further evaluation as   clinically indicated.    No acute displaced fracture or dislocation.  Tee Salvador is a 54 y/o M with past medical history significant for HTN, CHF (s/p AICD 2016), CAD (s/p CABG 2015), IDDM, PVD s/p stent x4 in RLE with R big toe/second toe amputation (2021) and ESRD on HD via LUE AVF for 6 years now s/p DDRT 7/16/2023 with Thymoglobulin induction. Post-transplant course complicated by severe constipation requiring disimpaction, DGF requiring hemodialysis, admission in August with K pneumoniae UTI treated with cefazolin then keflex. He has chronic bilateral foot wounds followed by wound care. PAtient states in the last week he started noticing some redness around the left leg foot ulcer with no pain or discharge. The podiatrist also noted necrosis of the tip of the third right toe. He was sent to ER for iv antibitoics and management. He has not received any antibiotics. He denies fever but felt chills a week back. On admission, afebrile, WBC 2.4.       Xray   Right foot:  Evaluation of the toes is limited due to patient positioning.  Status post amputation of the first ray to the level of the first   metatarsal head. There is minimal cortical irregularity with mild   progressive lucency within the first metatarsal head, which may represent   underlying osteomyelitis. Correlate clinically for overlying ulcer.  Status post amputation of the second toe to the level of the second   proximal phalanx with mild cortical irregularity along the amputation   margin of the second proximal phalanx, which may represent osteomyelitis.   Correlate clinically for overlying ulcer.      1. S/p DDKT 7/16/23 CMV D+/+, EBV D+/R+  Induction ATG  maintenance. tacro, mmf, pred  PPx; valcyte and bactrim  Complications DGF requiring HD    2. Left foot ulcer and cellulitis  3. Right 3d toe necrotic ulcer  4. History of first ray amputation 8/2021 - tissue cx grew E faecalis, Corynebacterium prevotella spp, Lecrercia spp   CXR with concern for OM although not in areas of concern on exam  Vascular studies with small vessel disease.     Left ulcer culture sent ? debrided tossue: Klebsiella pneumoniae, PsA and E faecalis  zosyn/vanc 12/6-    Recommendations  ·	MRi of both feet with OM of the left first metatarsal head and proximal phalynx of first toe and thrid right toe tip  ·	plan is to perform MRA with vascular  ·	Plan also for amputation of R thrid distal toe on 12/11 and debridement of LEft bone tissue  ·	Please send MARGIN LEFT bone tissue bacterial cx, MARGIn histopath from R toe and MARGIN bacterial cultures . Suspect will still need 6 weeks of antibiotics given no plans to amputate L metatarsal/proximal phalanx  ·	ESR, crp weekly  ·	Meanwhile, cultuures from ulcer superficial debridement with ESBl Klebsiella, PSA and E faecalis.      Unless surgical operative cultures grow additional organisms will plan to treat with IV imipenem x 6weeks total therapy  Patient will need PICC line placement and home care services arranged by primary team    He can follow up in the ID office after discharge    Plan discussed with patient    Jackson Moore MD  Can be called via Teams  After 5pm/weekends [phone]

## 2023-12-14 NOTE — PROGRESS NOTE ADULT - SUBJECTIVE AND OBJECTIVE BOX
INFECTIOUS DISEASES FOLLOW UP-- Alicia Moore  [phone]    This is a follow up note for this  53yMale with  Osteomyelitis        ROS:  CONSTITUTIONAL:  No fever, good appetite  CARDIOVASCULAR:  No chest pain or palpitations  RESPIRATORY:  No dyspnea  GASTROINTESTINAL:  No nausea, vomiting, diarrhea, or abdominal pain  GENITOURINARY:  No dysuria  NEUROLOGIC:  No headache,     Allergies    Contrast. (Unknown)    Intolerances        ANTIBIOTICS/RELEVANT:  antimicrobials  imipenem/cilastatin  IVPB 500 milliGRAM(s) IV Intermittent every 6 hours  imipenem/cilastatin  IVPB      trimethoprim   80 mG/sulfamethoxazole 400 mG 1 Tablet(s) Oral daily    immunologic:    OTHER:  acetaminophen     Tablet .. 650 milliGRAM(s) Oral every 6 hours PRN  apixaban 5 milliGRAM(s) Oral every 12 hours  aspirin enteric coated 81 milliGRAM(s) Oral daily  atorvastatin 40 milliGRAM(s) Oral at bedtime  carvedilol 3.125 milliGRAM(s) Oral every 12 hours  famotidine    Tablet 20 milliGRAM(s) Oral daily  insulin glargine Injectable (LANTUS) 10 Unit(s) SubCutaneous at bedtime  insulin lispro (ADMELOG) corrective regimen sliding scale   SubCutaneous three times a day before meals  insulin lispro Injectable (ADMELOG) 10 Unit(s) SubCutaneous three times a day with meals  oxycodone    5 mG/acetaminophen 325 mG 1 Tablet(s) Oral every 4 hours PRN  predniSONE   Tablet 10 milliGRAM(s) Oral daily      Objective:  Vital Signs Last 24 Hrs  T(C): 36.8 (14 Dec 2023 21:57), Max: 36.9 (14 Dec 2023 04:49)  T(F): 98.2 (14 Dec 2023 21:57), Max: 98.4 (14 Dec 2023 04:49)  HR: 74 (14 Dec 2023 21:57) (62 - 81)  BP: 161/76 (14 Dec 2023 21:57) (122/72 - 161/76)  BP(mean): --  RR: 18 (14 Dec 2023 21:57) (18 - 18)  SpO2: 98% (14 Dec 2023 21:57) (98% - 100%)    Parameters below as of 14 Dec 2023 21:57  Patient On (Oxygen Delivery Method): room air        PHYSICAL EXAM:  Constitutional:no acute distress  Eyes:THOM, EOMI  Ear/Nose/Throat: no oral lesions, 	  Respiratory: clear BL  Cardiovascular: S1S2  Gastrointestinal:soft, (+) BS, no tenderness  Extremities:no e/e/c  RUE PICC line  No Lymphadenopathy  IV sites not inflammed.    LABS:                        10.1   2.90  )-----------( 193      ( 14 Dec 2023 05:00 )             32.5     12-14    140  |  108  |  27<H>  ----------------------------<  122<H>  4.7   |  21<L>  |  1.10    Ca    10.1      14 Dec 2023 05:00  Phos  2.2     12-14  Mg     1.8     12-14    TPro  5.5<L>  /  Alb  3.3  /  TBili  0.2  /  DBili  x   /  AST  10  /  ALT  6<L>  /  AlkPhos  92  12-14    PT/INR - ( 14 Dec 2023 05:00 )   PT: 12.7 sec;   INR: 1.16 ratio         PTT - ( 14 Dec 2023 05:00 )  PTT:102.3 sec  Urinalysis Basic - ( 14 Dec 2023 05:00 )    Color: x / Appearance: x / SG: x / pH: x  Gluc: 122 mg/dL / Ketone: x  / Bili: x / Urobili: x   Blood: x / Protein: x / Nitrite: x   Leuk Esterase: x / RBC: x / WBC x   Sq Epi: x / Non Sq Epi: x / Bacteria: x        MICROBIOLOGY:            RECENT CULTURES:  12-11 @ 21:16  .Tissue Other  Klebsiella pneumoniae ESBL  Staphylococcus epidermidis  Klebsiella pneumoniae ESBL  RUBY    Rare Klebsiella pneumoniae ESBL  Few Staphylococcus epidermidis  --      RADIOLOGY & ADDITIONAL STUDIES:  < from: Xray Chest 1 View- PORTABLE-Urgent (Xray Chest 1 View- PORTABLE-Urgent .) (12.14.23 @ 15:14) >    IMPRESSION:  Interval placement of right upper extremity PICC with tip in the   cavoatrial junction. Small left effusion.    < end of copied text >   INFECTIOUS DISEASES FOLLOW UP-- Alicia Moore  [phone]    This is a follow up note for this  53yMale with  Osteomyelitis        ROS:  CONSTITUTIONAL:  No fever, good appetite  CARDIOVASCULAR:  No chest pain or palpitations  RESPIRATORY:  No dyspnea  GASTROINTESTINAL:  No nausea, vomiting, diarrhea, or abdominal pain  GENITOURINARY:  No dysuria  NEUROLOGIC:  No headache,     Allergies    Contrast. (Unknown)    Intolerances        ANTIBIOTICS/RELEVANT:  antimicrobials  imipenem/cilastatin  IVPB 500 milliGRAM(s) IV Intermittent every 6 hours  imipenem/cilastatin  IVPB      trimethoprim   80 mG/sulfamethoxazole 400 mG 1 Tablet(s) Oral daily    immunologic:    OTHER:  acetaminophen     Tablet .. 650 milliGRAM(s) Oral every 6 hours PRN  apixaban 5 milliGRAM(s) Oral every 12 hours  aspirin enteric coated 81 milliGRAM(s) Oral daily  atorvastatin 40 milliGRAM(s) Oral at bedtime  carvedilol 3.125 milliGRAM(s) Oral every 12 hours  famotidine    Tablet 20 milliGRAM(s) Oral daily  insulin glargine Injectable (LANTUS) 10 Unit(s) SubCutaneous at bedtime  insulin lispro (ADMELOG) corrective regimen sliding scale   SubCutaneous three times a day before meals  insulin lispro Injectable (ADMELOG) 10 Unit(s) SubCutaneous three times a day with meals  oxycodone    5 mG/acetaminophen 325 mG 1 Tablet(s) Oral every 4 hours PRN  predniSONE   Tablet 10 milliGRAM(s) Oral daily      Objective:  Vital Signs Last 24 Hrs  T(C): 36.8 (14 Dec 2023 21:57), Max: 36.9 (14 Dec 2023 04:49)  T(F): 98.2 (14 Dec 2023 21:57), Max: 98.4 (14 Dec 2023 04:49)  HR: 74 (14 Dec 2023 21:57) (62 - 81)  BP: 161/76 (14 Dec 2023 21:57) (122/72 - 161/76)  BP(mean): --  RR: 18 (14 Dec 2023 21:57) (18 - 18)  SpO2: 98% (14 Dec 2023 21:57) (98% - 100%)    Parameters below as of 14 Dec 2023 21:57  Patient On (Oxygen Delivery Method): room air        PHYSICAL EXAM:  Constitutional:no acute distress  Eyes:THOM, EOMI  Ear/Nose/Throat: no oral lesions, 	  Respiratory: clear BL  Cardiovascular: S1S2  Gastrointestinal:soft, (+) BS, no tenderness  Extremities:no e/e/c  RUE PICC line  No Lymphadenopathy  IV sites not inflammed.    LABS:                        10.1   2.90  )-----------( 193      ( 14 Dec 2023 05:00 )             32.5     12-14    140  |  108  |  27<H>  ----------------------------<  122<H>  4.7   |  21<L>  |  1.10    Ca    10.1      14 Dec 2023 05:00  Phos  2.2     12-14  Mg     1.8     12-14    TPro  5.5<L>  /  Alb  3.3  /  TBili  0.2  /  DBili  x   /  AST  10  /  ALT  6<L>  /  AlkPhos  92  12-14    PT/INR - ( 14 Dec 2023 05:00 )   PT: 12.7 sec;   INR: 1.16 ratio         PTT - ( 14 Dec 2023 05:00 )  PTT:102.3 sec  Urinalysis Basic - ( 14 Dec 2023 05:00 )    Color: x / Appearance: x / SG: x / pH: x  Gluc: 122 mg/dL / Ketone: x  / Bili: x / Urobili: x   Blood: x / Protein: x / Nitrite: x   Leuk Esterase: x / RBC: x / WBC x   Sq Epi: x / Non Sq Epi: x / Bacteria: x        MICROBIOLOGY:            RECENT CULTURES:  12-11 @ 21:16  .Tissue Other  Klebsiella pneumoniae ESBL  Staphylococcus epidermidis  Klebsiella pneumoniae ESBL  RUBY    Rare Klebsiella pneumoniae ESBL  Few Staphylococcus epidermidis  --      RADIOLOGY & ADDITIONAL STUDIES:  < from: Xray Chest 1 View- PORTABLE-Urgent (Xray Chest 1 View- PORTABLE-Urgent .) (12.14.23 @ 15:14) >    IMPRESSION:  Interval placement of right upper extremity PICC with tip in the   cavoatrial junction. Small left effusion.    < end of copied text >

## 2023-12-14 NOTE — PROGRESS NOTE ADULT - ASSESSMENT
54 y/o M with past medical history significant for IDDM, CAD s/p CABG x4v (2015) s/p AICD (2016), PVD (4 stents in RLE), HTN, HLD and ESRD previously on HD via LUE AVF (ligated 9/8/23) now s/p DDRT 7/16/2023 with Simulect -> Thymoglobulin induction. His post-transplant course was complicated by DGF and subtly [facility] with +DSAs s/p PLEX/IVIG (8/2023) and L subclavian thrombus remains on Eliquis presents to [facility] ED from wound care clinic on 12/6/23 after concern for osteomyelitis from clinic providers.     [] Osteomyelitis of R foot third digit and left foot 1st toe  - Left foot wound culture growing ESBL Klebsiella, E. Faecalis, M. Morganii, Profidencia Stauartii   - OR w/podiatry 12/11: R partial 3rd toe amp, R foot I&D of necrotic tissue, L foot application of kerecis graft.   - Cx: klebsiella/staph epidermidis  - Vascular team: kept NPO for LLE angiography today, started on heparin drip last night, angiogram cancelled after vascular discussion with podiatry, heparin drip dc'ed, resumed on eliquis  - ID following: Continue Imipenem (12/10--)  - Blood cultures with NGTD   - PICC line today for home IV abx, will continue Imipenem at home per TID    [ ] s/p DDRT 7/16/2023   - Baseline Cr ~1.0   - Immuno: Env by level, MMF HELD d/t c/f for osteomyelitis, Pred 10   - PPx: Bactrim, Pepcid   - Valcyte held for leukopenia (CMV negative 12/7)     [ ] IDDM  - Continue Lantus/Lispro home dose     [ ] CHF, CAD s/p CABG, L SC DVT   - Continue Eliquis, ASA 81   - Continue Coreg, Lipitor          52 y/o M with past medical history significant for IDDM, CAD s/p CABG x4v (2015) s/p AICD (2016), PVD (4 stents in RLE), HTN, HLD and ESRD previously on HD via LUE AVF (ligated 9/8/23) now s/p DDRT 7/16/2023 with Simulect -> Thymoglobulin induction. His post-transplant course was complicated by DGF and subtly [facility] with +DSAs s/p PLEX/IVIG (8/2023) and L subclavian thrombus remains on Eliquis presents to [facility] ED from wound care clinic on 12/6/23 after concern for osteomyelitis from clinic providers.     [] Osteomyelitis of R foot third digit and left foot 1st toe  - Left foot wound culture growing ESBL Klebsiella, E. Faecalis, M. Morganii, Profidencia Stauartii   - OR w/podiatry 12/11: R partial 3rd toe amp, R foot I&D of necrotic tissue, L foot application of kerecis graft.   - Cx: klebsiella/staph epidermidis  - Vascular team: kept NPO for LLE angiography today, started on heparin drip last night, angiogram cancelled after vascular discussion with podiatry, heparin drip dc'ed, resumed on eliquis  - ID following: Continue Imipenem (12/10--)  - Blood cultures with NGTD   - PICC line today for home IV abx, will continue Imipenem at home per TID    [ ] s/p DDRT 7/16/2023   - Baseline Cr ~1.0   - Immuno: Env by level, MMF HELD d/t c/f for osteomyelitis, Pred 10   - PPx: Bactrim, Pepcid   - Valcyte held for leukopenia (CMV negative 12/7)     [ ] IDDM  - Continue Lantus/Lispro home dose     [ ] CHF, CAD s/p CABG, L SC DVT   - Continue Eliquis, ASA 81   - Continue Coreg, Lipitor

## 2023-12-14 NOTE — PRE PROCEDURE NOTE - PRE PROCEDURE EVALUATION
------------------------------------------------------------  Vascular Surgery Pre-Procedure Note  ------------------------------------------------------------    Indication: 53y Male  who presents with a chief complaint of c/f osteomyelitis    Past Medical History:  Diabetes mellitus    Foot ulcer due to secondary DM    Coronary artery disease    Acute on chronic systolic heart failure    H/O kidney transplant        Allergies: Contrast. (Unknown)      Medications:  apixaban: 5 milliGRAM(s) Oral (23 @ 05:53)  aspirin enteric coated: 81 milliGRAM(s) Oral (23 @ 11:56)  carvedilol: 3.125 milliGRAM(s) Oral (23 @ 06:39)  heparin  Infusion.: 1200 Unit(s)/Hr IV Continuous (23 @ 01:54)  imipenem/cilastatin  IVPB: 100 mL/Hr IV Intermittent (23 @ 06:47)  trimethoprim   80 mG/sulfamethoxazole 400 m Tablet(s) Oral (23 @ 11:56)      Vital Signs:   T(F): 98.2 (08:50), Max: 98.4 (04:49)  HR: 72 (08:50)  BP: 155/67 (08:50)  RR: 18 (08:50)  SpO2: 98% (08:50)    Labs:           10.1  2.90)-----(193     (23 @ 05:00)         32.5     140 | 108 | 27  --------------------< 122     (23 @ 05:00)  4.7 | 21 | 1.10       PT: 12.7 23 @ 05:00  aPTT: 102.3<H> 23 @ 05:00   INR: 1.16 23 @ 05:00    Imaging: < from: MR Foot No Cont, Left (23 @ 14:11) >  INTERPRETATION:  EXAMINATION: MR FOOT LEFT, MR FOOT RIGHT    CLINICAL INDICATION:Evaluate for osteomyelitis    COMPARISON: Right foot radiographs dated 2022 and left foot   radiographs dated 2023 and additional priors.    TECHNIQUE: Multiplanar, multi-sequence MRI of the bilateral feet was   performed without intravenouscontrast.    INTERPRETATION:    Right foot:  Prior amputation of the first ray at the level of the first MTP joint.   Prior amputation of the second digit at the level of the distal aspect of   the proximal phalanx. There is T1 hypointense/abnormality consistent with   osteomyelitis involving the third digit middle and distal phalanges with   erosion of the tuft. There is mild cystic change at the base of the first   metatarsal, degenerative. There is edema within the intrinsic muscles of   the foot which may be seen in neuropathy. No abscess.    Left foot: Large/deep wound overlying the first metatarsal head at its T1   hypointense/STIR hyperintense confluent signal abnormality with   throughout the first metatarsal head/neck and involving the medial margin   of the first digit proximal phalanx, consistent with osteomyelitis. Prior   partial amputation of the distal third of the fifth metatarsal with the   fifth digit in situ. Mild midfoot arthrosis with patchy areas of   degenerative change. Moderate cystic change across the talonavicular   joint with cartilage thinning.There is edema within the intrinsic muscles   of the foot which may be seen in neuropathy. No abscess.    IMPRESSION:  1.  Right foot: Osteomyelitis of the right third digit middle and distal   phalanges with erosion of the distal tuft.    2.   Left foot: Deep wound over the first MTP joint medially, with   underlying osteomyelitis of the first metatarsal head/neck and first   digit proximal phalanx.    3.  Otherchronic and postsurgical findings as above.    --- End of Report ---      < end of copied text >      Consent: consented    Procedure Plan:  LE angio  - NPO at midnight  - 4 AM labs  - active T&S

## 2023-12-14 NOTE — PROGRESS NOTE ADULT - SUBJECTIVE AND OBJECTIVE BOX
Patient is a 53y old  Male who presents with a chief complaint of c/f osteomyelitis (14 Dec 2023 11:08)       INTERVAL HPI/OVERNIGHT EVENTS:  Patient seen and evaluated at bedside.  Pt is resting comfortable in NAD. Denies N/V/F/C.  Pain rated at X/10    Allergies    Contrast. (Unknown)    Intolerances        Vital Signs Last 24 Hrs  T(C): 36.8 (14 Dec 2023 08:50), Max: 36.9 (14 Dec 2023 04:49)  T(F): 98.2 (14 Dec 2023 08:50), Max: 98.4 (14 Dec 2023 04:49)  HR: 72 (14 Dec 2023 08:50) (69 - 76)  BP: 155/67 (14 Dec 2023 08:50) (122/72 - 156/77)  BP(mean): --  RR: 18 (14 Dec 2023 08:50) (18 - 18)  SpO2: 98% (14 Dec 2023 08:50) (98% - 100%)    Parameters below as of 14 Dec 2023 08:50  Patient On (Oxygen Delivery Method): room air        LABS:                        10.1   2.90  )-----------( 193      ( 14 Dec 2023 05:00 )             32.5     12-14    140  |  108  |  27<H>  ----------------------------<  122<H>  4.7   |  21<L>  |  1.10    Ca    10.1      14 Dec 2023 05:00  Phos  2.2     12-14  Mg     1.8     12-14    TPro  5.5<L>  /  Alb  3.3  /  TBili  0.2  /  DBili  x   /  AST  10  /  ALT  6<L>  /  AlkPhos  92  12-14    PT/INR - ( 14 Dec 2023 05:00 )   PT: 12.7 sec;   INR: 1.16 ratio         PTT - ( 14 Dec 2023 05:00 )  PTT:102.3 sec  Urinalysis Basic - ( 14 Dec 2023 05:00 )    Color: x / Appearance: x / SG: x / pH: x  Gluc: 122 mg/dL / Ketone: x  / Bili: x / Urobili: x   Blood: x / Protein: x / Nitrite: x   Leuk Esterase: x / RBC: x / WBC x   Sq Epi: x / Non Sq Epi: x / Bacteria: x      CAPILLARY BLOOD GLUCOSE      POCT Blood Glucose.: 119 mg/dL (14 Dec 2023 08:25)  POCT Blood Glucose.: 81 mg/dL (13 Dec 2023 21:04)  POCT Blood Glucose.: 162 mg/dL (13 Dec 2023 17:23)  POCT Blood Glucose.: 197 mg/dL (13 Dec 2023 12:54)      Lower Extremity Physical Exam:    Vascular: DP/PT 0/4 B/L, CFT <3 seconds B/L, Temperature gradient warm to cool b/l  Neuro: Epicritic sensation absent to the level of digits B/L  Musculoskeletal/Ortho: s/p right foot partial 1st ray resection, s/p right foot partial 2nd toe amputation, s/p left foot 5th MPJ resection  Skin: 12/11 s/p R foot partial 3rd ray resection: sutures intact, no dehiscence, no periwound erythema, no hematoma. 12/11 s/p L foot wound debridement with kerecis graft applicaiton: sutures and graft intact, no drainage, no periwound erythema, no hematoma.      RADIOLOGY & ADDITIONAL TESTS:

## 2023-12-14 NOTE — PROGRESS NOTE ADULT - ASSESSMENT
53M w s/p R foot partial 3rd ray resection  - Patient seen and evaluated  - Afebrile, WBC 2.9  - 12/11 s/p RF partial 3rd ray resection: sutures intact, no dehiscence, no periwound erythema, no hematoma. 12/11 s/p LF wound debridement with kerecis graft applicaiton: sutures and graft intact, no drainage, no periwound erythema, no hematoma.  - OR findings: low concern for residual bone or soft tissue infection, low concern for viability  - OR data: clean bone with no growth  - ID recommendations, appreciated   - Vascular recommendations appreciated  - AICD interrogation on 12/8, appreciated  - Wounds appear to be healing and viable, CFT less than 3 seconds, foot warm to touch  -Patient is stable from podiatry standpoint   - Seen with attending      53M w s/p R foot partial 3rd ray resection  - Patient seen and evaluated  - Afebrile, WBC 2.9  - 12/11 s/p RF partial 3rd ray resection: sutures intact, no dehiscence, no periwound erythema, no hematoma. 12/11 s/p LF wound debridement with kerecis graft applicaiton: sutures and graft intact, no drainage, no periwound erythema, no hematoma.  - OR findings: low concern for residual bone or soft tissue infection, low concern for viability  - OR data: clean bone with no growth  - Vascular recommendations appreciated  - Wounds appear to be healing and viable, CFT less than 3 seconds, foot warm to touch  - ID recommendations, appreciated - would recommend long term IV Abx/PICC 2/2 residual OM of the left foot hallux   -Patient is stable from podiatry standpoint   - Seen with attending

## 2023-12-14 NOTE — PROGRESS NOTE ADULT - PROBLEM SELECTOR PLAN 1
VARSHA Malone MD have participated in the daily care and management of this patient and have personally  seen and examined the patient today and have noted the findings and formulated the plan of care.  I Leonardo Malone MD have personally seen and examined the patient at bedside today at 9am

## 2023-12-14 NOTE — PROGRESS NOTE ADULT - SUBJECTIVE AND OBJECTIVE BOX
[facility] DIVISION OF KIDNEY DISEASES AND HYPERTENSION -- FOLLOW UP NOTE  --------------------------------------------------------------------------------    24 hour events/subjective:  -No acute events overnight  -Pt with osteomyelitis of toes   -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 (Unknown)    Intolerances    Standing Inpatient Medications  aspirin enteric coated 81 milliGRAM(s) Oral daily  atorvastatin 40 milliGRAM(s) Oral at bedtime  carvedilol 3.125 milliGRAM(s) Oral every 12 hours  famotidine    Tablet 20 milliGRAM(s) Oral daily  heparin  Infusion.  Unit(s)/Hr IV Continuous <Continuous>  imipenem/cilastatin  IVPB      imipenem/cilastatin  IVPB 500 milliGRAM(s) IV Intermittent every 6 hours  insulin glargine Injectable (LANTUS) 10 Unit(s) SubCutaneous at bedtime  insulin lispro (ADMELOG) corrective regimen sliding scale   SubCutaneous three times a day before meals  insulin lispro Injectable (ADMELOG) 10 Unit(s) SubCutaneous three times a day with meals  magnesium sulfate  IVPB 2 Gram(s) IV Intermittent once  potassium phosphate / sodium phosphate Tablet (K-PHOS No. 2) 1 Tablet(s) Oral two times a day  predniSONE   Tablet 10 milliGRAM(s) Oral daily  sodium chloride 0.9%. 1000 milliLiter(s) IV Continuous <Continuous>  tacrolimus ER Tablet (ENVARSUS XR) 1 milliGRAM(s) Oral <User Schedule>  trimethoprim   80 mG/sulfamethoxazole 400 mG 1 Tablet(s) Oral daily    PRN Inpatient Medications  acetaminophen     Tablet .. 650 milliGRAM(s) Oral every 6 hours PRN  heparin   Injectable 3500 Unit(s) IV Push every 6 hours PRN  heparin   Injectable 7000 Unit(s) IV Push every 6 hours PRN  oxycodone    5 mG/acetaminophen 325 mG 1 Tablet(s) Oral every 4 hours PRN    REVIEW OF SYSTEMS  --------------------------------------------------------------------------------  Gen: No fevers/chills  Respiratory: No dyspnea, cough,   CV: No chest pain  GI: No abdominal pain, diarrhea, constipation, nausea, vomiting  Transplant: No pain  : No increased frequency, dysuria, hematuria   MSK: No edema  Neuro: No dizziness/lightheadedness    All other systems were reviewed and are negative, except as noted.    VITALS/PHYSICAL EXAM  --------------------------------------------------------------------------------  T(C): 36.8 (12-14-23 @ 08:50), Max: 36.9 (12-14-23 @ 04:49)  HR: 72 (12-14-23 @ 08:50) (69 - 76)  BP: 155/67 (12-14-23 @ 08:50) (122/72 - 156/77)  RR: 18 (12-14-23 @ 08:50) (18 - 18)  SpO2: 98% (12-14-23 @ 08:50) (98% - 100%)  Wt(kg): --    12-13-23 @ 07:01  -  12-14-23 @ 07:00  --------------------------------------------------------  IN: 720 mL / OUT: 1175 mL / NET: -455 mL    12-14-23 @ 07:01  -  12-14-23 @ 11:26  --------------------------------------------------------  IN: 0 mL / OUT: 300 mL / NET: -300 mL    Physical Exam:  Gen: NAD  HEENT: Anicteric  Pulm: CTA B/L  CV: RRR, no murmur appreciated  Abd: +BS, soft and non distended abdomen. No tenderness to palpation. Transplant non tender, no bruit  Extremities: No edema, toe amputations, dressing on B/L LE  Skin: warm, stasis dermatitis  Neuro: Awake and alert    LABS/STUDIES  --------------------------------------------------------------------------------              10.1   2.90  >-----------<  193      [12-14-23 @ 05:00]              32.5     140  |  108  |  27  ----------------------------<  122      [12-14-23 @ 05:00]  4.7   |  21  |  1.10        Ca     10.1     [12-14-23 @ 05:00]      Mg     1.8     [12-14-23 @ 05:00]      Phos  2.2     [12-14-23 @ 05:00]    TPro  5.5  /  Alb  3.3  /  TBili  0.2  /  DBili  x   /  AST  10  /  ALT  6   /  AlkPhos  92  [12-14-23 @ 05:00]    PT/INR: PT 12.7 , INR 1.16       [12-14-23 @ 05:00]  PTT: 102.3      [12-14-23 @ 05:00]    Creatinine Trend:  SCr 1.10 [12-14 @ 05:00]  SCr 1.37 [12-13 @ 07:10]  SCr 1.54 [12-12 @ 07:00]  SCr 1.30 [12-11 @ 06:24]  SCr 1.32 [12-10 @ 06:30]    Tacrolimus (), Serum: 3.4 ng/mL (12-14 @ 05:00)  Tacrolimus (), Serum: 6.2 ng/mL (12-13 @ 07:11)  Tacrolimus (), Serum: 6.6 ng/mL (12-12 @ 07:06)  Tacrolimus (), Serum: 12.0 ng/mL (12-11 @ 06:24)    Urinalysis - [12-14-23 @ 05:00]      Color  / Appearance  / SG  / pH       Gluc 122 / Ketone   / Bili  / Urobili        Blood  / Protein  / Leuk Est  / Nitrite       RBC  / WBC  / Hyaline  / Gran  / Sq Epi  / Non Sq Epi  / Bacteria     Iron 68, TIBC 185, %sat 37      [08-16-23 @ 06:39]  Ferritin 1010      [08-16-23 @ 06:39]  PTH -- (Ca 10.6)      [11-02-23 @ 11:56]   220  Vitamin D (25OH) 18.0      [11-02-23 @ 11:56] [facility] DIVISION OF KIDNEY DISEASES AND HYPERTENSION -- FOLLOW UP NOTE  --------------------------------------------------------------------------------    24 hour events/subjective:  -No acute events overnight  -Pt with osteomyelitis of toes   -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 (Unknown)    Intolerances    Standing Inpatient Medications  aspirin enteric coated 81 milliGRAM(s) Oral daily  atorvastatin 40 milliGRAM(s) Oral at bedtime  carvedilol 3.125 milliGRAM(s) Oral every 12 hours  famotidine    Tablet 20 milliGRAM(s) Oral daily  heparin  Infusion.  Unit(s)/Hr IV Continuous <Continuous>  imipenem/cilastatin  IVPB      imipenem/cilastatin  IVPB 500 milliGRAM(s) IV Intermittent every 6 hours  insulin glargine Injectable (LANTUS) 10 Unit(s) SubCutaneous at bedtime  insulin lispro (ADMELOG) corrective regimen sliding scale   SubCutaneous three times a day before meals  insulin lispro Injectable (ADMELOG) 10 Unit(s) SubCutaneous three times a day with meals  magnesium sulfate  IVPB 2 Gram(s) IV Intermittent once  potassium phosphate / sodium phosphate Tablet (K-PHOS No. 2) 1 Tablet(s) Oral two times a day  predniSONE   Tablet 10 milliGRAM(s) Oral daily  sodium chloride 0.9%. 1000 milliLiter(s) IV Continuous <Continuous>  tacrolimus ER Tablet (ENVARSUS XR) 1 milliGRAM(s) Oral <User Schedule>  trimethoprim   80 mG/sulfamethoxazole 400 mG 1 Tablet(s) Oral daily    PRN Inpatient Medications  acetaminophen     Tablet .. 650 milliGRAM(s) Oral every 6 hours PRN  heparin   Injectable 3500 Unit(s) IV Push every 6 hours PRN  heparin   Injectable 7000 Unit(s) IV Push every 6 hours PRN  oxycodone    5 mG/acetaminophen 325 mG 1 Tablet(s) Oral every 4 hours PRN    REVIEW OF SYSTEMS  --------------------------------------------------------------------------------  Gen: No fevers/chills  Respiratory: No dyspnea, cough,   CV: No chest pain  GI: No abdominal pain, diarrhea, constipation, nausea, vomiting  Transplant: No pain  : No increased frequency, dysuria, hematuria   MSK: No edema  Neuro: No dizziness/lightheadedness    All other systems were reviewed and are negative, except as noted.    VITALS/PHYSICAL EXAM  --------------------------------------------------------------------------------  T(C): 36.8 (12-14-23 @ 08:50), Max: 36.9 (12-14-23 @ 04:49)  HR: 72 (12-14-23 @ 08:50) (69 - 76)  BP: 155/67 (12-14-23 @ 08:50) (122/72 - 156/77)  RR: 18 (12-14-23 @ 08:50) (18 - 18)  SpO2: 98% (12-14-23 @ 08:50) (98% - 100%)  Wt(kg): --    12-13-23 @ 07:01  -  12-14-23 @ 07:00  --------------------------------------------------------  IN: 720 mL / OUT: 1175 mL / NET: -455 mL    12-14-23 @ 07:01  -  12-14-23 @ 11:26  --------------------------------------------------------  IN: 0 mL / OUT: 300 mL / NET: -300 mL    Physical Exam:  Gen: NAD  HEENT: Anicteric  Pulm: CTA B/L  CV: RRR, no murmur appreciated  Abd: +BS, soft and non distended abdomen. No tenderness to palpation. Transplant non tender, no bruit  Extremities: No edema, toe amputations, dressing on B/L LE  Skin: warm, stasis dermatitis  Neuro: Awake and alert    LABS/STUDIES  --------------------------------------------------------------------------------              10.1   2.90  >-----------<  193      [12-14-23 @ 05:00]              32.5     140  |  108  |  27  ----------------------------<  122      [12-14-23 @ 05:00]  4.7   |  21  |  1.10        Ca     10.1     [12-14-23 @ 05:00]      Mg     1.8     [12-14-23 @ 05:00]      Phos  2.2     [12-14-23 @ 05:00]    TPro  5.5  /  Alb  3.3  /  TBili  0.2  /  DBili  x   /  AST  10  /  ALT  6   /  AlkPhos  92  [12-14-23 @ 05:00]    PT/INR: PT 12.7 , INR 1.16       [12-14-23 @ 05:00]  PTT: 102.3      [12-14-23 @ 05:00]    Creatinine Trend:  SCr 1.10 [12-14 @ 05:00]  SCr 1.37 [12-13 @ 07:10]  SCr 1.54 [12-12 @ 07:00]  SCr 1.30 [12-11 @ 06:24]  SCr 1.32 [12-10 @ 06:30]    Tacrolimus (), Serum: 3.4 ng/mL (12-14 @ 05:00)  Tacrolimus (), Serum: 6.2 ng/mL (12-13 @ 07:11)  Tacrolimus (), Serum: 6.6 ng/mL (12-12 @ 07:06)  Tacrolimus (), Serum: 12.0 ng/mL (12-11 @ 06:24)    Urinalysis - [12-14-23 @ 05:00]      Color  / Appearance  / SG  / pH       Gluc 122 / Ketone   / Bili  / Urobili        Blood  / Protein  / Leuk Est  / Nitrite       RBC  / WBC  / Hyaline  / Gran  / Sq Epi  / Non Sq Epi  / Bacteria     Iron 68, TIBC 185, %sat 37      [08-16-23 @ 06:39]  Ferritin 1010      [08-16-23 @ 06:39]  PTH -- (Ca 10.6)      [11-02-23 @ 11:56]   220  Vitamin D (25OH) 18.0      [11-02-23 @ 11:56]

## 2023-12-14 NOTE — PROGRESS NOTE ADULT - ASSESSMENT
ASSESSMENT: 52 y/o M with PMH of HTN, CHF (s/p AICD 2016), CAD (s/p CABG 2015), IDDM, PVD s/p stent x4 in RLE with R big toe/second toe amputation (2021) and ESRD on HD via LUE AVF for 6 years now s/p DDRT 7/16/2023 presenting to the ED with infected R 3rd toe and left 1st metatarsal ulcers. Patient follows with Dr. Malone of Vascular Surgery. Patient states he has had these wounds for a while, follows with Podiatry. However, states that over the past few weeks, they have begun to look worse, specifically the left 1st metatarsal ulcer. Vascular surgery consulted for evaluation.    - d/w podiatry attending  rt toe amp and left foot  skin graft healing  w good uptake   will hold off on  both rle and lle angio  d/w pt that if in the future there are any changes  we can  at that time proceed w   le angiography  abx rx for left foot OM as per ID and podiatry  above d/w pt  will follow

## 2023-12-15 ENCOUNTER — TRANSCRIPTION ENCOUNTER (OUTPATIENT)
Age: 53
End: 2023-12-15

## 2023-12-15 LAB
ALBUMIN SERPL ELPH-MCNC: 3.4 G/DL — SIGNIFICANT CHANGE UP (ref 3.3–5)
ALBUMIN SERPL ELPH-MCNC: 3.4 G/DL — SIGNIFICANT CHANGE UP (ref 3.3–5)
ALP SERPL-CCNC: 103 U/L — SIGNIFICANT CHANGE UP (ref 40–120)
ALP SERPL-CCNC: 103 U/L — SIGNIFICANT CHANGE UP (ref 40–120)
ALT FLD-CCNC: 9 U/L — LOW (ref 10–45)
ALT FLD-CCNC: 9 U/L — LOW (ref 10–45)
ANION GAP SERPL CALC-SCNC: 10 MMOL/L — SIGNIFICANT CHANGE UP (ref 5–17)
ANION GAP SERPL CALC-SCNC: 10 MMOL/L — SIGNIFICANT CHANGE UP (ref 5–17)
APTT BLD: 36.5 SEC — HIGH (ref 24.5–35.6)
APTT BLD: 36.5 SEC — HIGH (ref 24.5–35.6)
AST SERPL-CCNC: 13 U/L — SIGNIFICANT CHANGE UP (ref 10–40)
AST SERPL-CCNC: 13 U/L — SIGNIFICANT CHANGE UP (ref 10–40)
BASOPHILS # BLD AUTO: 0 K/UL — SIGNIFICANT CHANGE UP (ref 0–0.2)
BASOPHILS # BLD AUTO: 0 K/UL — SIGNIFICANT CHANGE UP (ref 0–0.2)
BASOPHILS NFR BLD AUTO: 0 % — SIGNIFICANT CHANGE UP (ref 0–2)
BASOPHILS NFR BLD AUTO: 0 % — SIGNIFICANT CHANGE UP (ref 0–2)
BILIRUB SERPL-MCNC: 0.3 MG/DL — SIGNIFICANT CHANGE UP (ref 0.2–1.2)
BILIRUB SERPL-MCNC: 0.3 MG/DL — SIGNIFICANT CHANGE UP (ref 0.2–1.2)
BUN SERPL-MCNC: 25 MG/DL — HIGH (ref 7–23)
BUN SERPL-MCNC: 25 MG/DL — HIGH (ref 7–23)
CALCIUM SERPL-MCNC: 10.3 MG/DL — SIGNIFICANT CHANGE UP (ref 8.4–10.5)
CALCIUM SERPL-MCNC: 10.3 MG/DL — SIGNIFICANT CHANGE UP (ref 8.4–10.5)
CHLORIDE SERPL-SCNC: 106 MMOL/L — SIGNIFICANT CHANGE UP (ref 96–108)
CHLORIDE SERPL-SCNC: 106 MMOL/L — SIGNIFICANT CHANGE UP (ref 96–108)
CO2 SERPL-SCNC: 21 MMOL/L — LOW (ref 22–31)
CO2 SERPL-SCNC: 21 MMOL/L — LOW (ref 22–31)
CREAT SERPL-MCNC: 1.11 MG/DL — SIGNIFICANT CHANGE UP (ref 0.5–1.3)
CREAT SERPL-MCNC: 1.11 MG/DL — SIGNIFICANT CHANGE UP (ref 0.5–1.3)
EGFR: 80 ML/MIN/1.73M2 — SIGNIFICANT CHANGE UP
EGFR: 80 ML/MIN/1.73M2 — SIGNIFICANT CHANGE UP
EOSINOPHIL # BLD AUTO: 0.05 K/UL — SIGNIFICANT CHANGE UP (ref 0–0.5)
EOSINOPHIL # BLD AUTO: 0.05 K/UL — SIGNIFICANT CHANGE UP (ref 0–0.5)
EOSINOPHIL NFR BLD AUTO: 1.4 % — SIGNIFICANT CHANGE UP (ref 0–6)
EOSINOPHIL NFR BLD AUTO: 1.4 % — SIGNIFICANT CHANGE UP (ref 0–6)
GLUCOSE BLDC GLUCOMTR-MCNC: 136 MG/DL — HIGH (ref 70–99)
GLUCOSE BLDC GLUCOMTR-MCNC: 136 MG/DL — HIGH (ref 70–99)
GLUCOSE BLDC GLUCOMTR-MCNC: 161 MG/DL — HIGH (ref 70–99)
GLUCOSE BLDC GLUCOMTR-MCNC: 161 MG/DL — HIGH (ref 70–99)
GLUCOSE BLDC GLUCOMTR-MCNC: 249 MG/DL — HIGH (ref 70–99)
GLUCOSE BLDC GLUCOMTR-MCNC: 249 MG/DL — HIGH (ref 70–99)
GLUCOSE BLDC GLUCOMTR-MCNC: 94 MG/DL — SIGNIFICANT CHANGE UP (ref 70–99)
GLUCOSE BLDC GLUCOMTR-MCNC: 94 MG/DL — SIGNIFICANT CHANGE UP (ref 70–99)
GLUCOSE SERPL-MCNC: 104 MG/DL — HIGH (ref 70–99)
GLUCOSE SERPL-MCNC: 104 MG/DL — HIGH (ref 70–99)
HCT VFR BLD CALC: 34.8 % — LOW (ref 39–50)
HCT VFR BLD CALC: 34.8 % — LOW (ref 39–50)
HGB BLD-MCNC: 10.9 G/DL — LOW (ref 13–17)
HGB BLD-MCNC: 10.9 G/DL — LOW (ref 13–17)
LYMPHOCYTES # BLD AUTO: 0.37 K/UL — LOW (ref 1–3.3)
LYMPHOCYTES # BLD AUTO: 0.37 K/UL — LOW (ref 1–3.3)
LYMPHOCYTES # BLD AUTO: 11.1 % — LOW (ref 13–44)
LYMPHOCYTES # BLD AUTO: 11.1 % — LOW (ref 13–44)
MAGNESIUM SERPL-MCNC: 1.9 MG/DL — SIGNIFICANT CHANGE UP (ref 1.6–2.6)
MAGNESIUM SERPL-MCNC: 1.9 MG/DL — SIGNIFICANT CHANGE UP (ref 1.6–2.6)
MANUAL SMEAR VERIFICATION: SIGNIFICANT CHANGE UP
MANUAL SMEAR VERIFICATION: SIGNIFICANT CHANGE UP
MCHC RBC-ENTMCNC: 27.7 PG — SIGNIFICANT CHANGE UP (ref 27–34)
MCHC RBC-ENTMCNC: 27.7 PG — SIGNIFICANT CHANGE UP (ref 27–34)
MCHC RBC-ENTMCNC: 31.3 GM/DL — LOW (ref 32–36)
MCHC RBC-ENTMCNC: 31.3 GM/DL — LOW (ref 32–36)
MCV RBC AUTO: 88.5 FL — SIGNIFICANT CHANGE UP (ref 80–100)
MCV RBC AUTO: 88.5 FL — SIGNIFICANT CHANGE UP (ref 80–100)
MONOCYTES # BLD AUTO: 0.28 K/UL — SIGNIFICANT CHANGE UP (ref 0–0.9)
MONOCYTES # BLD AUTO: 0.28 K/UL — SIGNIFICANT CHANGE UP (ref 0–0.9)
MONOCYTES NFR BLD AUTO: 8.3 % — SIGNIFICANT CHANGE UP (ref 2–14)
MONOCYTES NFR BLD AUTO: 8.3 % — SIGNIFICANT CHANGE UP (ref 2–14)
NEUTROPHILS # BLD AUTO: 2.63 K/UL — SIGNIFICANT CHANGE UP (ref 1.8–7.4)
NEUTROPHILS # BLD AUTO: 2.63 K/UL — SIGNIFICANT CHANGE UP (ref 1.8–7.4)
NEUTROPHILS NFR BLD AUTO: 79.2 % — HIGH (ref 43–77)
NEUTROPHILS NFR BLD AUTO: 79.2 % — HIGH (ref 43–77)
PHOSPHATE SERPL-MCNC: 2 MG/DL — LOW (ref 2.5–4.5)
PHOSPHATE SERPL-MCNC: 2 MG/DL — LOW (ref 2.5–4.5)
PLAT MORPH BLD: NORMAL — SIGNIFICANT CHANGE UP
PLAT MORPH BLD: NORMAL — SIGNIFICANT CHANGE UP
PLATELET # BLD AUTO: 199 K/UL — SIGNIFICANT CHANGE UP (ref 150–400)
PLATELET # BLD AUTO: 199 K/UL — SIGNIFICANT CHANGE UP (ref 150–400)
POTASSIUM SERPL-MCNC: 4.7 MMOL/L — SIGNIFICANT CHANGE UP (ref 3.5–5.3)
POTASSIUM SERPL-MCNC: 4.7 MMOL/L — SIGNIFICANT CHANGE UP (ref 3.5–5.3)
POTASSIUM SERPL-SCNC: 4.7 MMOL/L — SIGNIFICANT CHANGE UP (ref 3.5–5.3)
POTASSIUM SERPL-SCNC: 4.7 MMOL/L — SIGNIFICANT CHANGE UP (ref 3.5–5.3)
PROT SERPL-MCNC: 6.1 G/DL — SIGNIFICANT CHANGE UP (ref 6–8.3)
PROT SERPL-MCNC: 6.1 G/DL — SIGNIFICANT CHANGE UP (ref 6–8.3)
RBC # BLD: 3.93 M/UL — LOW (ref 4.2–5.8)
RBC # BLD: 3.93 M/UL — LOW (ref 4.2–5.8)
RBC # FLD: 12.8 % — SIGNIFICANT CHANGE UP (ref 10.3–14.5)
RBC # FLD: 12.8 % — SIGNIFICANT CHANGE UP (ref 10.3–14.5)
RBC BLD AUTO: SIGNIFICANT CHANGE UP
RBC BLD AUTO: SIGNIFICANT CHANGE UP
SODIUM SERPL-SCNC: 137 MMOL/L — SIGNIFICANT CHANGE UP (ref 135–145)
SODIUM SERPL-SCNC: 137 MMOL/L — SIGNIFICANT CHANGE UP (ref 135–145)
TACROLIMUS SERPL-MCNC: 4.9 NG/ML — SIGNIFICANT CHANGE UP
TACROLIMUS SERPL-MCNC: 4.9 NG/ML — SIGNIFICANT CHANGE UP
WBC # BLD: 3.32 K/UL — LOW (ref 3.8–10.5)
WBC # BLD: 3.32 K/UL — LOW (ref 3.8–10.5)
WBC # FLD AUTO: 3.32 K/UL — LOW (ref 3.8–10.5)
WBC # FLD AUTO: 3.32 K/UL — LOW (ref 3.8–10.5)

## 2023-12-15 PROCEDURE — 99232 SBSQ HOSP IP/OBS MODERATE 35: CPT | Mod: 24

## 2023-12-15 PROCEDURE — 99232 SBSQ HOSP IP/OBS MODERATE 35: CPT

## 2023-12-15 PROCEDURE — 99232 SBSQ HOSP IP/OBS MODERATE 35: CPT | Mod: GC

## 2023-12-15 PROCEDURE — 71045 X-RAY EXAM CHEST 1 VIEW: CPT | Mod: 26

## 2023-12-15 RX ORDER — MYCOPHENOLATE MOFETIL 250 MG/1
500 CAPSULE ORAL
Refills: 0 | Status: DISCONTINUED | OUTPATIENT
Start: 2023-12-15 | End: 2023-12-16

## 2023-12-15 RX ORDER — TACROLIMUS 5 MG/1
2 CAPSULE ORAL
Qty: 0 | Refills: 0 | DISCHARGE
Start: 2023-12-15

## 2023-12-15 RX ORDER — MYCOPHENOLATE MOFETIL 250 MG/1
500 CAPSULE ORAL ONCE
Refills: 0 | Status: COMPLETED | OUTPATIENT
Start: 2023-12-15 | End: 2023-12-15

## 2023-12-15 RX ADMIN — APIXABAN 5 MILLIGRAM(S): 2.5 TABLET, FILM COATED ORAL at 17:42

## 2023-12-15 RX ADMIN — Medication 4: at 12:52

## 2023-12-15 RX ADMIN — Medication 2: at 17:41

## 2023-12-15 RX ADMIN — IMIPENEM AND CILASTATIN 100 MILLIGRAM(S): 250; 250 INJECTION, POWDER, FOR SOLUTION INTRAVENOUS at 23:34

## 2023-12-15 RX ADMIN — CARVEDILOL PHOSPHATE 3.12 MILLIGRAM(S): 80 CAPSULE, EXTENDED RELEASE ORAL at 17:42

## 2023-12-15 RX ADMIN — MYCOPHENOLATE MOFETIL 500 MILLIGRAM(S): 250 CAPSULE ORAL at 10:28

## 2023-12-15 RX ADMIN — CARVEDILOL PHOSPHATE 3.12 MILLIGRAM(S): 80 CAPSULE, EXTENDED RELEASE ORAL at 05:31

## 2023-12-15 RX ADMIN — Medication 10 UNIT(S): at 12:51

## 2023-12-15 RX ADMIN — FAMOTIDINE 20 MILLIGRAM(S): 10 INJECTION INTRAVENOUS at 12:13

## 2023-12-15 RX ADMIN — MYCOPHENOLATE MOFETIL 500 MILLIGRAM(S): 250 CAPSULE ORAL at 21:57

## 2023-12-15 RX ADMIN — IMIPENEM AND CILASTATIN 100 MILLIGRAM(S): 250; 250 INJECTION, POWDER, FOR SOLUTION INTRAVENOUS at 17:48

## 2023-12-15 RX ADMIN — Medication 81 MILLIGRAM(S): at 12:13

## 2023-12-15 RX ADMIN — IMIPENEM AND CILASTATIN 100 MILLIGRAM(S): 250; 250 INJECTION, POWDER, FOR SOLUTION INTRAVENOUS at 05:30

## 2023-12-15 RX ADMIN — Medication 1 TABLET(S): at 12:13

## 2023-12-15 RX ADMIN — IMIPENEM AND CILASTATIN 100 MILLIGRAM(S): 250; 250 INJECTION, POWDER, FOR SOLUTION INTRAVENOUS at 12:14

## 2023-12-15 RX ADMIN — APIXABAN 5 MILLIGRAM(S): 2.5 TABLET, FILM COATED ORAL at 05:34

## 2023-12-15 RX ADMIN — TACROLIMUS 2 MILLIGRAM(S): 5 CAPSULE ORAL at 09:31

## 2023-12-15 RX ADMIN — Medication 10 MILLIGRAM(S): at 05:31

## 2023-12-15 RX ADMIN — Medication 10 UNIT(S): at 17:41

## 2023-12-15 RX ADMIN — ATORVASTATIN CALCIUM 40 MILLIGRAM(S): 80 TABLET, FILM COATED ORAL at 21:53

## 2023-12-15 NOTE — PROGRESS NOTE ADULT - PROBLEM SELECTOR PLAN 1
VARSHA Malone MD have participated in the daily care and management of this patient and have personally  seen and examined the patient today and have noted the findings and formulated the plan of care.  I Leonardo Malone MD have personally seen and examined the patient at bedside today at  1  pm

## 2023-12-15 NOTE — PROGRESS NOTE ADULT - ASSESSMENT
52 y/o M with past medical history significant for IDDM, CAD s/p CABG x4v (2015) s/p AICD (2016), PVD (4 stents in RLE), HTN, HLD and ESRD previously on HD via LUE AVF (ligated 9/8/23) now s/p DDRT 7/16/2023 with Simulect -> Thymoglobulin induction. His post-transplant course was complicated by DGF and subtly [facility] with +DSAs s/p PLEX/IVIG (8/2023) and L subclavian thrombus remains on Eliquis presents to [facility] ED from wound care clinic on 12/6/23 after concern for osteomyelitis from clinic providers.     [] Osteomyelitis of R foot third digit and left foot 1st toe  - Left foot wound culture growing ESBL Klebsiella, E. Faecalis, M. Morganii, Profidencia Stauartii   - OR w/podiatry 12/11: R partial 3rd toe amp, R foot I&D of necrotic tissue, L foot application of kerecis graft.   - Cx: klebsiella/staph epidermidis  - Vascular team: heparin drip dc'ed, resumed on eliquis  - ID following: Continue Imipenem (12/10) x 4 weeks  - Blood cultures with NGTD   - PICC line 12/14 for home IV abx, will continue Imipenem at home per TID  - Ready for discharge home with abx    [ ] s/p DDRT 7/16/2023   - Baseline Cr ~1.0   - Immuno: Env by level, MMF restarted at 500mg BID, Pred 10   - PPx: Bactrim, Pepcid   - Valcyte held for leukopenia (CMV negative 12/7)     [ ] IDDM  - Continue Lantus/Lispro home dose     [ ] CHF, CAD s/p CABG, L SC DVT   - Continue Eliquis, ASA 81   - Continue Coreg, Lipitor          52 y/o M with past medical history significant for IDDM, CAD s/p CABG x4v (2015) s/p AICD (2016), PVD (4 stents in RLE), HTN, HLD and ESRD previously on HD via LUE AVF (ligated 9/8/23) now s/p DDRT 7/16/2023 with Simulect -> Thymoglobulin induction. His post-transplant course was complicated by DGF and subtly AMR with +DSAs s/p PLEX/IVIG (8/2023) and L subclavian thrombus remains on Eliquis presents to [facility] ED from wound care clinic on 12/6/23 after concern for osteomyelitis from clinic providers.     [] Osteomyelitis of R foot third digit and left foot 1st toe  - Left foot wound culture growing ESBL Klebsiella, E. Faecalis, M. Morganii, Profidencia Stauartii   - OR w/podiatry 12/11: R partial 3rd toe amp, R foot I&D of necrotic tissue, L foot application of kerecis graft.   - Cx: klebsiella/staph epidermidis  - Vascular team: heparin drip dc'ed, resumed on eliquis  - ID following: Continue Imipenem (12/10) x 4 weeks  - Blood cultures with NGTD   - PICC line 12/14 for home IV abx, will continue Imipenem at home per TID  - Ready for discharge home with abx    [ ] s/p DDRT 7/16/2023   - Baseline Cr ~1.0   - Immuno: Env by level, MMF restarted at 500mg BID, Pred 10   - PPx: Bactrim, Pepcid   - Valcyte held for leukopenia (CMV negative 12/7)     [ ] IDDM  - Continue Lantus/Lispro home dose     [ ] CHF, CAD s/p CABG, L SC DVT   - Continue Eliquis, ASA 81   - Continue Coreg, Lipitor

## 2023-12-15 NOTE — PROGRESS NOTE ADULT - SUBJECTIVE AND OBJECTIVE BOX
Patient is a 53y old  Male who presents with a chief complaint of c/f osteomyelitis (15 Dec 2023 08:46)      Vascular Surgery Attending Progress Note    Interval HPI: pt w/o new c/o     Medications:  acetaminophen     Tablet .. 650 milliGRAM(s) Oral every 6 hours PRN  apixaban 5 milliGRAM(s) Oral every 12 hours  aspirin enteric coated 81 milliGRAM(s) Oral daily  atorvastatin 40 milliGRAM(s) Oral at bedtime  carvedilol 3.125 milliGRAM(s) Oral every 12 hours  famotidine    Tablet 20 milliGRAM(s) Oral daily  imipenem/cilastatin  IVPB      imipenem/cilastatin  IVPB 500 milliGRAM(s) IV Intermittent every 6 hours  insulin glargine Injectable (LANTUS) 10 Unit(s) SubCutaneous at bedtime  insulin lispro (ADMELOG) corrective regimen sliding scale   SubCutaneous three times a day before meals  insulin lispro Injectable (ADMELOG) 10 Unit(s) SubCutaneous three times a day with meals  mycophenolate mofetil 500 milliGRAM(s) Oral <User Schedule>  oxycodone    5 mG/acetaminophen 325 mG 1 Tablet(s) Oral every 4 hours PRN  predniSONE   Tablet 10 milliGRAM(s) Oral daily  tacrolimus ER Tablet (ENVARSUS XR) 2 milliGRAM(s) Oral <User Schedule>  trimethoprim   80 mG/sulfamethoxazole 400 mG 1 Tablet(s) Oral daily      Vital Signs Last 24 Hrs  T(C): 36.9 (15 Dec 2023 10:22), Max: 37 (15 Dec 2023 01:00)  T(F): 98.4 (15 Dec 2023 10:22), Max: 98.6 (15 Dec 2023 01:00)  HR: 68 (15 Dec 2023 10:22) (62 - 81)  BP: 127/77 (15 Dec 2023 10:22) (127/77 - 161/76)  BP(mean): --  RR: 18 (15 Dec 2023 06:13) (18 - 18)  SpO2: 100% (15 Dec 2023 06:13) (98% - 100%)    Parameters below as of 15 Dec 2023 10:22  Patient On (Oxygen Delivery Method): room air      I&O's Summary    14 Dec 2023 07:01  -  15 Dec 2023 07:00  --------------------------------------------------------  IN: 880 mL / OUT: 2540 mL / NET: -1660 mL        Physical Exam:  Neuro  A&Ox3 VSS  Vascular:  callie foot dressings c/d/i    LABS:                        10.9   3.32  )-----------( 199      ( 15 Dec 2023 07:11 )             34.8     12-15    137  |  106  |  25<H>  ----------------------------<  104<H>  4.7   |  21<L>  |  1.11    Ca    10.3      15 Dec 2023 07:11  Phos  2.0     12-15  Mg     1.9     12-15    TPro  6.1  /  Alb  3.4  /  TBili  0.3  /  DBili  x   /  AST  13  /  ALT  9<L>  /  AlkPhos  103  12-15    PT/INR - ( 14 Dec 2023 05:00 )   PT: 12.7 sec;   INR: 1.16 ratio         PTT - ( 15 Dec 2023 07:11 )  PTT:36.5 sec    STEVE DYER MD  [phone] Cell [phone] Patient is a 53y old  Male who presents with a chief complaint of c/f osteomyelitis (15 Dec 2023 08:46)      Vascular Surgery Attending Progress Note    Interval HPI: pt w/o new c/o     Medications:  acetaminophen     Tablet .. 650 milliGRAM(s) Oral every 6 hours PRN  apixaban 5 milliGRAM(s) Oral every 12 hours  aspirin enteric coated 81 milliGRAM(s) Oral daily  atorvastatin 40 milliGRAM(s) Oral at bedtime  carvedilol 3.125 milliGRAM(s) Oral every 12 hours  famotidine    Tablet 20 milliGRAM(s) Oral daily  imipenem/cilastatin  IVPB      imipenem/cilastatin  IVPB 500 milliGRAM(s) IV Intermittent every 6 hours  insulin glargine Injectable (LANTUS) 10 Unit(s) SubCutaneous at bedtime  insulin lispro (ADMELOG) corrective regimen sliding scale   SubCutaneous three times a day before meals  insulin lispro Injectable (ADMELOG) 10 Unit(s) SubCutaneous three times a day with meals  mycophenolate mofetil 500 milliGRAM(s) Oral <User Schedule>  oxycodone    5 mG/acetaminophen 325 mG 1 Tablet(s) Oral every 4 hours PRN  predniSONE   Tablet 10 milliGRAM(s) Oral daily  tacrolimus ER Tablet (ENVARSUS XR) 2 milliGRAM(s) Oral <User Schedule>  trimethoprim   80 mG/sulfamethoxazole 400 mG 1 Tablet(s) Oral daily      Vital Signs Last 24 Hrs  T(C): 36.9 (15 Dec 2023 10:22), Max: 37 (15 Dec 2023 01:00)  T(F): 98.4 (15 Dec 2023 10:22), Max: 98.6 (15 Dec 2023 01:00)  HR: 68 (15 Dec 2023 10:22) (62 - 81)  BP: 127/77 (15 Dec 2023 10:22) (127/77 - 161/76)  BP(mean): --  RR: 18 (15 Dec 2023 06:13) (18 - 18)  SpO2: 100% (15 Dec 2023 06:13) (98% - 100%)    Parameters below as of 15 Dec 2023 10:22  Patient On (Oxygen Delivery Method): room air      I&O's Summary    14 Dec 2023 07:01  -  15 Dec 2023 07:00  --------------------------------------------------------  IN: 880 mL / OUT: 2540 mL / NET: -1660 mL        Physical Exam:  Neuro  A&Ox3 VSS  Vascular:  callie foot dressings c/d/i    LABS:                        10.9   3.32  )-----------( 199      ( 15 Dec 2023 07:11 )             34.8     12-15    137  |  106  |  25<H>  ----------------------------<  104<H>  4.7   |  21<L>  |  1.11    Ca    10.3      15 Dec 2023 07:11  Phos  2.0     12-15  Mg     1.9     12-15    TPro  6.1  /  Alb  3.4  /  TBili  0.3  /  DBili  x   /  AST  13  /  ALT  9<L>  /  AlkPhos  103  12-15    PT/INR - ( 14 Dec 2023 05:00 )   PT: 12.7 sec;   INR: 1.16 ratio         PTT - ( 15 Dec 2023 07:11 )  PTT:36.5 sec    STEVE DYER MD  [phone] Cell [phone]

## 2023-12-15 NOTE — PROGRESS NOTE ADULT - ASSESSMENT
Left foot:    Status post amputation of the fifth metatarsal and proximal aspect of the   fifth proximal phalanx. Cortical irregularity along the fifth distal   phalanx, which may represent osteomyelitis. Correlate clinically for   overlying ulcer. MRI can be completed for further evaluation as   clinically indicated.    No acute displaced fracture or dislocation.  Tee Salvador is a 52 y/o M with past medical history significant for HTN, CHF (s/p AICD 2016), CAD (s/p CABG 2015), IDDM, PVD s/p stent x4 in RLE with R big toe/second toe amputation (2021) and ESRD on HD via LUE AVF for 6 years now s/p DDRT 7/16/2023 with Thymoglobulin induction. Post-transplant course complicated by severe constipation requiring disimpaction, DGF requiring hemodialysis, admission in August with K pneumoniae UTI treated with cefazolin then keflex. He has chronic bilateral foot wounds followed by wound care. PAtient states in the last week he started noticing some redness around the left leg foot ulcer with no pain or discharge. The podiatrist also noted necrosis of the tip of the third right toe. He was sent to ER for iv antibitoics and management. He has not received any antibiotics. He denies fever but felt chills a week back. On admission, afebrile, WBC 2.4.       Xray   Right foot:  Evaluation of the toes is limited due to patient positioning.  Status post amputation of the first ray to the level of the first   metatarsal head. There is minimal cortical irregularity with mild   progressive lucency within the first metatarsal head, which may represent   underlying osteomyelitis. Correlate clinically for overlying ulcer.  Status post amputation of the second toe to the level of the second   proximal phalanx with mild cortical irregularity along the amputation   margin of the second proximal phalanx, which may represent osteomyelitis.   Correlate clinically for overlying ulcer.      1. S/p DDKT 7/16/23 CMV D+/+, EBV D+/R+  Induction ATG  maintenance. tacro, mmf, pred  PPx; valcyte and bactrim  Complications DGF requiring HD    2. Left foot ulcer and cellulitis  3. Right 3d toe necrotic ulcer  4. History of first ray amputation 8/2021 - tissue cx grew E faecalis, Corynebacterium prevotella spp, Lecrercia spp   CXR with concern for OM although not in areas of concern on exam  Vascular studies with small vessel disease.     Left ulcer culture sent ? debrided tossue: Klebsiella pneumoniae, PsA and E faecalis  zosyn/vanc 12/6-    Recommendations  ·	MRi of both feet with OM of the left first metatarsal head and proximal phalynx of first toe and thrid right toe tip  ·	plan is to perform MRA with vascular  ·	Plan also for amputation of R thrid distal toe on 12/11 and debridement of LEft bone tissue  ·	Please send MARGIN LEFT bone tissue bacterial cx, MARGIn histopath from R toe and MARGIN bacterial cultures . Suspect will still need 6 weeks of antibiotics given no plans to amputate L metatarsal/proximal phalanx  ·	ESR, crp weekly  ·	Meanwhile, cultures from ulcer superficial debridement with ESBl Klebsiella, PSA and E faecalis.      Unless surgical operative cultures grow additional organisms will plan to treat with IV imipenem x 6weeks total therapy  PICC line placement in RUE site CDI    - home care services arranged by primary team- please let us now which infusion service  - should have weekly labs with CBC and CMP    He can follow up in the ID office after discharge    Plan discussed with patient    Jackson Moore MD  Can be called via Teams  After 5pm/weekends [phone]           Left foot:    Status post amputation of the fifth metatarsal and proximal aspect of the   fifth proximal phalanx. Cortical irregularity along the fifth distal   phalanx, which may represent osteomyelitis. Correlate clinically for   overlying ulcer. MRI can be completed for further evaluation as   clinically indicated.    No acute displaced fracture or dislocation.  Tee Salvador is a 54 y/o M with past medical history significant for HTN, CHF (s/p AICD 2016), CAD (s/p CABG 2015), IDDM, PVD s/p stent x4 in RLE with R big toe/second toe amputation (2021) and ESRD on HD via LUE AVF for 6 years now s/p DDRT 7/16/2023 with Thymoglobulin induction. Post-transplant course complicated by severe constipation requiring disimpaction, DGF requiring hemodialysis, admission in August with K pneumoniae UTI treated with cefazolin then keflex. He has chronic bilateral foot wounds followed by wound care. PAtient states in the last week he started noticing some redness around the left leg foot ulcer with no pain or discharge. The podiatrist also noted necrosis of the tip of the third right toe. He was sent to ER for iv antibitoics and management. He has not received any antibiotics. He denies fever but felt chills a week back. On admission, afebrile, WBC 2.4.       Xray   Right foot:  Evaluation of the toes is limited due to patient positioning.  Status post amputation of the first ray to the level of the first   metatarsal head. There is minimal cortical irregularity with mild   progressive lucency within the first metatarsal head, which may represent   underlying osteomyelitis. Correlate clinically for overlying ulcer.  Status post amputation of the second toe to the level of the second   proximal phalanx with mild cortical irregularity along the amputation   margin of the second proximal phalanx, which may represent osteomyelitis.   Correlate clinically for overlying ulcer.      1. S/p DDKT 7/16/23 CMV D+/+, EBV D+/R+  Induction ATG  maintenance. tacro, mmf, pred  PPx; valcyte and bactrim  Complications DGF requiring HD    2. Left foot ulcer and cellulitis  3. Right 3d toe necrotic ulcer  4. History of first ray amputation 8/2021 - tissue cx grew E faecalis, Corynebacterium prevotella spp, Lecrercia spp   CXR with concern for OM although not in areas of concern on exam  Vascular studies with small vessel disease.     Left ulcer culture sent ? debrided tossue: Klebsiella pneumoniae, PsA and E faecalis  zosyn/vanc 12/6-    Recommendations  ·	MRi of both feet with OM of the left first metatarsal head and proximal phalynx of first toe and thrid right toe tip  ·	plan is to perform MRA with vascular  ·	Plan also for amputation of R thrid distal toe on 12/11 and debridement of LEft bone tissue  ·	Please send MARGIN LEFT bone tissue bacterial cx, MARGIn histopath from R toe and MARGIN bacterial cultures . Suspect will still need 6 weeks of antibiotics given no plans to amputate L metatarsal/proximal phalanx  ·	ESR, crp weekly  ·	Meanwhile, cultures from ulcer superficial debridement with ESBl Klebsiella, PSA and E faecalis.      Unless surgical operative cultures grow additional organisms will plan to treat with IV imipenem x 6weeks total therapy  PICC line placement in RUE site CDI    - home care services arranged by primary team- please let us now which infusion service  - should have weekly labs with CBC and CMP    He can follow up in the ID office after discharge    Plan discussed with patient    Jackson Moore MD  Can be called via Teams  After 5pm/weekends [phone]

## 2023-12-15 NOTE — PROGRESS NOTE ADULT - SUBJECTIVE AND OBJECTIVE BOX
INFECTIOUS DISEASES FOLLOW UP-- Alicia Moore  [phone]    This is a follow up note for this  53yMale with  Osteomyelitis  possible discharge home today on IV imipenem      ROS:  CONSTITUTIONAL:  No fever, good appetite  CARDIOVASCULAR:  No chest pain or palpitations  RESPIRATORY:  No dyspnea  GASTROINTESTINAL:  No nausea, vomiting, diarrhea, or abdominal pain  GENITOURINARY:  No dysuria  NEUROLOGIC:  No headache,     Allergies    Contrast. (Unknown)    Intolerances        ANTIBIOTICS/RELEVANT:  antimicrobials  imipenem/cilastatin  IVPB      imipenem/cilastatin  IVPB 500 milliGRAM(s) IV Intermittent every 6 hours  trimethoprim   80 mG/sulfamethoxazole 400 mG 1 Tablet(s) Oral daily    immunologic:  mycophenolate mofetil 500 milliGRAM(s) Oral <User Schedule>  tacrolimus ER Tablet (ENVARSUS XR) 2 milliGRAM(s) Oral <User Schedule>    OTHER:  acetaminophen     Tablet .. 650 milliGRAM(s) Oral every 6 hours PRN  apixaban 5 milliGRAM(s) Oral every 12 hours  aspirin enteric coated 81 milliGRAM(s) Oral daily  atorvastatin 40 milliGRAM(s) Oral at bedtime  carvedilol 3.125 milliGRAM(s) Oral every 12 hours  famotidine    Tablet 20 milliGRAM(s) Oral daily  insulin glargine Injectable (LANTUS) 10 Unit(s) SubCutaneous at bedtime  insulin lispro (ADMELOG) corrective regimen sliding scale   SubCutaneous three times a day before meals  insulin lispro Injectable (ADMELOG) 10 Unit(s) SubCutaneous three times a day with meals  oxycodone    5 mG/acetaminophen 325 mG 1 Tablet(s) Oral every 4 hours PRN  predniSONE   Tablet 10 milliGRAM(s) Oral daily      Objective:  Vital Signs Last 24 Hrs  T(C): 36.8 (15 Dec 2023 14:53), Max: 37 (15 Dec 2023 01:00)  T(F): 98.2 (15 Dec 2023 14:53), Max: 98.6 (15 Dec 2023 01:00)  HR: 83 (15 Dec 2023 14:53) (62 - 83)  BP: 112/71 (15 Dec 2023 14:53) (112/71 - 161/76)  BP(mean): --  RR: 18 (15 Dec 2023 14:53) (18 - 18)  SpO2: 99% (15 Dec 2023 14:53) (98% - 100%)    Parameters below as of 15 Dec 2023 14:53  Patient On (Oxygen Delivery Method): room air        PHYSICAL EXAM:  Constitutional:no acute distress  Eyes:THOM, EOMI  Ear/Nose/Throat: no oral lesions, 	  Respiratory: clear BL  Cardiovascular: S1S2  Gastrointestinal:soft, (+) BS, no tenderness  Extremities:no e/e/c s/p right foot partial 3rd ray ampuation  RUE PICC line  No Lymphadenopathy  IV sites not inflammed.    LABS:                        10.9   3.32  )-----------( 199      ( 15 Dec 2023 07:11 )             34.8     12-15    137  |  106  |  25<H>  ----------------------------<  104<H>  4.7   |  21<L>  |  1.11    Ca    10.3      15 Dec 2023 07:11  Phos  2.0     12-15  Mg     1.9     12-15    TPro  6.1  /  Alb  3.4  /  TBili  0.3  /  DBili  x   /  AST  13  /  ALT  9<L>  /  AlkPhos  103  12-15    PT/INR - ( 14 Dec 2023 05:00 )   PT: 12.7 sec;   INR: 1.16 ratio         PTT - ( 15 Dec 2023 07:11 )  PTT:36.5 sec  Urinalysis Basic - ( 15 Dec 2023 07:11 )    Color: x / Appearance: x / SG: x / pH: x  Gluc: 104 mg/dL / Ketone: x  / Bili: x / Urobili: x   Blood: x / Protein: x / Nitrite: x   Leuk Esterase: x / RBC: x / WBC x   Sq Epi: x / Non Sq Epi: x / Bacteria: x        MICROBIOLOGY:            RECENT CULTURES:  12-11 @ 21:16  .Tissue Other  Klebsiella pneumoniae ESBL  Staphylococcus epidermidis  Klebsiella pneumoniae ESBL  RUBY    Rare Klebsiella pneumoniae ESBL  Few Staphylococcus epidermidis  --      RADIOLOGY & ADDITIONAL STUDIES:    < from: Xray Chest 1 View- PORTABLE-Urgent (Xray Chest 1 View- PORTABLE-Urgent .) (12.15.23 @ 11:18) >  IMPRESSION:  PICC tip in SVC    < end of copied text >   INFECTIOUS DISEASES FOLLOW UP-- Alicia Moore  [phone]    This is a follow up note for this  53yMale with  Osteomyelitis  possible discharge home today on IV imipenem      ROS:  CONSTITUTIONAL:  No fever, good appetite  CARDIOVASCULAR:  No chest pain or palpitations  RESPIRATORY:  No dyspnea  GASTROINTESTINAL:  No nausea, vomiting, diarrhea, or abdominal pain  GENITOURINARY:  No dysuria  NEUROLOGIC:  No headache,     Allergies    Contrast. (Unknown)    Intolerances        ANTIBIOTICS/RELEVANT:  antimicrobials  imipenem/cilastatin  IVPB      imipenem/cilastatin  IVPB 500 milliGRAM(s) IV Intermittent every 6 hours  trimethoprim   80 mG/sulfamethoxazole 400 mG 1 Tablet(s) Oral daily    immunologic:  mycophenolate mofetil 500 milliGRAM(s) Oral <User Schedule>  tacrolimus ER Tablet (ENVARSUS XR) 2 milliGRAM(s) Oral <User Schedule>    OTHER:  acetaminophen     Tablet .. 650 milliGRAM(s) Oral every 6 hours PRN  apixaban 5 milliGRAM(s) Oral every 12 hours  aspirin enteric coated 81 milliGRAM(s) Oral daily  atorvastatin 40 milliGRAM(s) Oral at bedtime  carvedilol 3.125 milliGRAM(s) Oral every 12 hours  famotidine    Tablet 20 milliGRAM(s) Oral daily  insulin glargine Injectable (LANTUS) 10 Unit(s) SubCutaneous at bedtime  insulin lispro (ADMELOG) corrective regimen sliding scale   SubCutaneous three times a day before meals  insulin lispro Injectable (ADMELOG) 10 Unit(s) SubCutaneous three times a day with meals  oxycodone    5 mG/acetaminophen 325 mG 1 Tablet(s) Oral every 4 hours PRN  predniSONE   Tablet 10 milliGRAM(s) Oral daily      Objective:  Vital Signs Last 24 Hrs  T(C): 36.8 (15 Dec 2023 14:53), Max: 37 (15 Dec 2023 01:00)  T(F): 98.2 (15 Dec 2023 14:53), Max: 98.6 (15 Dec 2023 01:00)  HR: 83 (15 Dec 2023 14:53) (62 - 83)  BP: 112/71 (15 Dec 2023 14:53) (112/71 - 161/76)  BP(mean): --  RR: 18 (15 Dec 2023 14:53) (18 - 18)  SpO2: 99% (15 Dec 2023 14:53) (98% - 100%)    Parameters below as of 15 Dec 2023 14:53  Patient On (Oxygen Delivery Method): room air        PHYSICAL EXAM:  Constitutional:no acute distress  Eyes:THOM, EOMI  Ear/Nose/Throat: no oral lesions, 	  Respiratory: clear BL  Cardiovascular: S1S2  Gastrointestinal:soft, (+) BS, no tenderness  Extremities:no e/e/c s/p right foot partial 3rd ray ampuation  RUE PICC line  No Lymphadenopathy  IV sites not inflammed.    LABS:                        10.9   3.32  )-----------( 199      ( 15 Dec 2023 07:11 )             34.8     12-15    137  |  106  |  25<H>  ----------------------------<  104<H>  4.7   |  21<L>  |  1.11    Ca    10.3      15 Dec 2023 07:11  Phos  2.0     12-15  Mg     1.9     12-15    TPro  6.1  /  Alb  3.4  /  TBili  0.3  /  DBili  x   /  AST  13  /  ALT  9<L>  /  AlkPhos  103  12-15    PT/INR - ( 14 Dec 2023 05:00 )   PT: 12.7 sec;   INR: 1.16 ratio         PTT - ( 15 Dec 2023 07:11 )  PTT:36.5 sec  Urinalysis Basic - ( 15 Dec 2023 07:11 )    Color: x / Appearance: x / SG: x / pH: x  Gluc: 104 mg/dL / Ketone: x  / Bili: x / Urobili: x   Blood: x / Protein: x / Nitrite: x   Leuk Esterase: x / RBC: x / WBC x   Sq Epi: x / Non Sq Epi: x / Bacteria: x        MICROBIOLOGY:            RECENT CULTURES:  12-11 @ 21:16  .Tissue Other  Klebsiella pneumoniae ESBL  Staphylococcus epidermidis  Klebsiella pneumoniae ESBL  RUBY    Rare Klebsiella pneumoniae ESBL  Few Staphylococcus epidermidis  --      RADIOLOGY & ADDITIONAL STUDIES:    < from: Xray Chest 1 View- PORTABLE-Urgent (Xray Chest 1 View- PORTABLE-Urgent .) (12.15.23 @ 11:18) >  IMPRESSION:  PICC tip in SVC    < end of copied text >

## 2023-12-15 NOTE — DISCHARGE NOTE NURSING/CASE MANAGEMENT/SOCIAL WORK - NSSCCONTNUM_GEN_ALL_CORE
[phone] ([facility] [place] [phone] (Home Care) [phone] ([facility]); [phone] (Home Care)

## 2023-12-15 NOTE — PROGRESS NOTE ADULT - SUBJECTIVE AND OBJECTIVE BOX
Subjective: Patient seen and examined. No new events except as noted.     SUBJECTIVE/ROS:  No chest pain, dyspnea, palpitation, or dizziness.       MEDICATIONS:  MEDICATIONS  (STANDING):  apixaban 5 milliGRAM(s) Oral every 12 hours  aspirin enteric coated 81 milliGRAM(s) Oral daily  atorvastatin 40 milliGRAM(s) Oral at bedtime  carvedilol 3.125 milliGRAM(s) Oral every 12 hours  famotidine    Tablet 20 milliGRAM(s) Oral daily  imipenem/cilastatin  IVPB      imipenem/cilastatin  IVPB 500 milliGRAM(s) IV Intermittent every 6 hours  insulin glargine Injectable (LANTUS) 10 Unit(s) SubCutaneous at bedtime  insulin lispro (ADMELOG) corrective regimen sliding scale   SubCutaneous three times a day before meals  insulin lispro Injectable (ADMELOG) 10 Unit(s) SubCutaneous three times a day with meals  predniSONE   Tablet 10 milliGRAM(s) Oral daily  tacrolimus ER Tablet (ENVARSUS XR) 2 milliGRAM(s) Oral <User Schedule>  trimethoprim   80 mG/sulfamethoxazole 400 mG 1 Tablet(s) Oral daily      PHYSICAL EXAM:  T(C): 36.8 (12-15-23 @ 06:13), Max: 37 (12-15-23 @ 01:00)  HR: 72 (12-15-23 @ 06:13) (62 - 81)  BP: 131/54 (12-15-23 @ 06:13) (131/54 - 161/76)  RR: 18 (12-15-23 @ 06:13) (18 - 18)  SpO2: 100% (12-15-23 @ 06:13) (98% - 100%)  Wt(kg): --  I&O's Summary    14 Dec 2023 07:01  -  15 Dec 2023 07:00  --------------------------------------------------------  IN: 880 mL / OUT: 2540 mL / NET: -1660 mL            JVP: Normal  Neck: supple  Lung: clear   CV: S1 S2 , Murmur:  Abd: soft  Ext: No edema  neuro: Awake / alert  Psych: flat affect  Skin: normal``    LABS/DATA:    CARDIAC MARKERS:                                10.9   3.32  )-----------( 199      ( 15 Dec 2023 07:11 )             34.8     12-15    137  |  106  |  25<H>  ----------------------------<  104<H>  4.7   |  21<L>  |  1.11    Ca    10.3      15 Dec 2023 07:11  Phos  2.0     12-15  Mg     1.9     12-15    TPro  6.1  /  Alb  3.4  /  TBili  0.3  /  DBili  x   /  AST  13  /  ALT  9<L>  /  AlkPhos  103  12-15    proBNP:   Lipid Profile:   HgA1c:   TSH:     TELE:  EKG:

## 2023-12-15 NOTE — PROGRESS NOTE ADULT - ASSESSMENT
CAD s/p cabg  stable   asymptomatic   asa, statin    chronic systolic chf  stable  asymptomatic   improved LV function   cont Coreg  off ace/arb/arni for now   s/p ICD    ESRD  s/p transplant   fu with transplant team

## 2023-12-15 NOTE — PROGRESS NOTE ADULT - ASSESSMENT
53M with PMH of HTN, HLD, DM2, CAD s/p CABG, HF s/p AICD, ESRD on HD since 2016 s/p DDRT (7/14/23) complicated by DGF, and toe amputations due to PVD/osteo, now presenting to the ED with concern for toe infection. Nephrology service consulted for management of immunosuppression of transplanted kidney.     1. 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   - Initially received Simulect induction but changed to Thymoglobulin given DGF.  - Follows with Dr. Hairston  - Scr now WNL at 1.11 today    2. Immunosuppression medications  - On MMF, Envarsus, and Prednisone 10mg (+DSA on 7/15/23) at home  - Resumed on half dose MMF  - Tacro level 4.9 today  - Check serum tacrolimus level (trough) 30 mins prior to AM dose.  - Ppx with Bactrim    3. Osteomyelitis   - managed by Podiatry, Vascular, and Transplant ID  - Pt underwent Right foot partial 3rd ray resection and left foot wound debridement on 12/11.   - Will continue on abx for ~6 weeks as per Transplant ID. PICC line placement.   - No plan for angio as per Vascular note.    4. HTN - acceptable on Coreg 3.125mg BID  5. DM2- On lantus and premeal insulin      Plan for discharge today.  If you have any questions, please feel free to contact me.  Stuart Franklin MD  Nephrology Fellow  [id number] / [employer] Teams (Preferred)  (After 4pm or on weekends, please call the on-call Fellow) 53M with PMH of HTN, HLD, DM2, CAD s/p CABG, HF s/p AICD, ESRD on HD since 2016 s/p DDRT (7/14/23) complicated by DGF, and toe amputations due to PVD/osteo, now presenting to the ED with concern for toe infection. Nephrology service consulted for management of immunosuppression of transplanted kidney.     1. 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   - Initially received Simulect induction but changed to Thymoglobulin given DGF.  - Follows with Dr. Hairston  - Scr now WNL at 1.11 today    2. Immunosuppression medications  - On MMF, Envarsus, and Prednisone 10mg (+DSA on 7/15/23) at home  - Resumed on half dose MMF  - Tacro level 4.9 today  - Check serum tacrolimus level (trough) 30 mins prior to AM dose.  - Ppx with Bactrim    3. Osteomyelitis   - managed by Podiatry, Vascular, and Transplant ID  - Pt underwent Right foot partial 3rd ray resection and left foot wound debridement on 12/11.   - Will continue on abx for ~6 weeks as per Transplant ID. PICC line placement.   - No plan for angio as per Vascular note.    4. HTN - acceptable on Coreg 3.125mg BID  5. DM2- On lantus and premeal insulin      Plan for discharge today.  If you have any questions, please feel free to contact me.  Stuart Franklni MD  Nephrology Fellow  [id number] / [employer] Teams (Preferred)  (After 4pm or on weekends, please call the on-call Fellow)

## 2023-12-15 NOTE — PROGRESS NOTE ADULT - SUBJECTIVE AND OBJECTIVE BOX
Transplant Surgery - Multidisciplinary Progress Note  --------------------------------------------------------------  DDRT 7/16/2023    Present: Patient seen and examined with multidisciplinary team including Transplant Surgeon:  Dr. Muñoz. transplant fellow Dr. Kitchen, Transplant Nephrologist: Dr. Aguilar,  Transplant Pharmacist: Edgardo  and unit RN during am rounds.  Disciplines not in attendance will be notified of the plan.     53M with past medical history significant for HTN, CHF (s/p AICD 2016--last interrogated 7/15/23), CAD (s/p CABG 2015), IDDM, PVD s/p stent x4 in RLE with R big toe/second toe amputation (2021) and ESRD on HD via LUE AVF for 6 years now s/p DDRT 7/16/2023 with Thymoglobulin induction. Post-transplant course complicated by severe constipation requiring disimpaction, DGF requiring hemodialysis and LUE swelling with concern for L subclavian thrombus on Eliquis, now s/p IR fistulogram 7/7/23 with balloon angioplasty of subclavian vein. He was discharged home on 7/27/23     Presented to [facility] ED from wound care clinic on 12/6/23 after concern for osteomyelitis from clinic providers.     Interval Events:  - Afebrile, VSS  - Heparin drip dced, resumed eliquis  - PICC placed for abx administration on discharge  - ON FS 98, decreased lantus to 5u      Immunosuppression:  Maintenance Immuno: Envarsus by level, MMF held, Pred 10mg    Potential Discharge date: TBD  Plan of care: see below        MEDICATIONS  (STANDING):  apixaban 5 milliGRAM(s) Oral every 12 hours  aspirin enteric coated 81 milliGRAM(s) Oral daily  atorvastatin 40 milliGRAM(s) Oral at bedtime  carvedilol 3.125 milliGRAM(s) Oral every 12 hours  famotidine    Tablet 20 milliGRAM(s) Oral daily  imipenem/cilastatin  IVPB      imipenem/cilastatin  IVPB 500 milliGRAM(s) IV Intermittent every 6 hours  insulin glargine Injectable (LANTUS) 10 Unit(s) SubCutaneous at bedtime  insulin lispro (ADMELOG) corrective regimen sliding scale   SubCutaneous three times a day before meals  insulin lispro Injectable (ADMELOG) 10 Unit(s) SubCutaneous three times a day with meals  mycophenolate mofetil 500 milliGRAM(s) Oral <User Schedule>  predniSONE   Tablet 10 milliGRAM(s) Oral daily  tacrolimus ER Tablet (ENVARSUS XR) 2 milliGRAM(s) Oral <User Schedule>  trimethoprim   80 mG/sulfamethoxazole 400 mG 1 Tablet(s) Oral daily    MEDICATIONS  (PRN):  acetaminophen     Tablet .. 650 milliGRAM(s) Oral every 6 hours PRN Mild Pain (1 - 3)  oxycodone    5 mG/acetaminophen 325 mG 1 Tablet(s) Oral every 4 hours PRN Moderate Pain (4 - 6)      PAST MEDICAL & SURGICAL HISTORY:  Diabetes mellitus  type 2      Foot ulcer due to secondary DM      Coronary artery disease      Acute on chronic systolic heart failure      H/O kidney transplant      Breast Reduction  at age 18      Toe amputation status, left      S/P CABG (coronary artery bypass graft)      AICD (automatic cardioverter/defibrillator) present          Vital Signs Last 24 Hrs  T(C): 36.8 (15 Dec 2023 14:53), Max: 37 (15 Dec 2023 01:00)  T(F): 98.2 (15 Dec 2023 14:53), Max: 98.6 (15 Dec 2023 01:00)  HR: 83 (15 Dec 2023 14:53) (62 - 83)  BP: 112/71 (15 Dec 2023 14:53) (112/71 - 161/76)  BP(mean): --  RR: 18 (15 Dec 2023 14:53) (18 - 18)  SpO2: 99% (15 Dec 2023 14:53) (98% - 100%)    Parameters below as of 15 Dec 2023 14:53  Patient On (Oxygen Delivery Method): room air        I&O's Summary    14 Dec 2023 07:01  -  15 Dec 2023 07:00  --------------------------------------------------------  IN: 880 mL / OUT: 2540 mL / NET: -1660 mL                              10.9   3.32  )-----------( 199      ( 15 Dec 2023 07:11 )             34.8     12-15    137  |  106  |  25<H>  ----------------------------<  104<H>  4.7   |  21<L>  |  1.11    Ca    10.3      15 Dec 2023 07:11  Phos  2.0     12-15  Mg     1.9     12-15    TPro  6.1  /  Alb  3.4  /  TBili  0.3  /  DBili  x   /  AST  13  /  ALT  9<L>  /  AlkPhos  103  12-15    Tacrolimus (), Serum: 4.9 ng/mL (12-15 @ 07:11)        Culture - Tissue with Gram Stain (collected 12-11-23 @ 21:16)  Source: .Tissue Other  Gram Stain (12-12-23 @ 01:58):    Numerous polymorphonuclear leukocytes seen per low power field    No organisms seen per oil power field  Preliminary Report (12-12-23 @ 20:46):    No growth    Culture - Tissue with Gram Stain (collected 12-11-23 @ 21:16)  Source: .Tissue Other  Gram Stain (12-12-23 @ 22:53):    No polymorphonuclear cells seen per low power field    Rare Gram Variable Coccobacilli seen per oil power field  Preliminary Report (12-13-23 @ 21:35):    Rare Klebsiella pneumoniae ESBL    Few Staphylococcus epidermidis  Organism: Klebsiella pneumoniae ESBL  Staphylococcus epidermidis (12-13-23 @ 21:32)  Organism: Staphylococcus epidermidis (12-13-23 @ 21:32)  Organism: Klebsiella pneumoniae ESBL (12-13-23 @ 21:26)        Review of systems  All other systems were reviewed and are negative, except as noted.     PHYSICAL EXAM:  Constitutional: Well developed  Eyes: Anicteric, PERRLA  ENMT: nc/at  Neck: supple  Respiratory: CTA B/L  Cardiovascular: RRR  Gastrointestinal: Soft, ND/NT   Genitourinary: Voiding spontaneously  Extremities: SCD's in place and working bilaterally  Vascular: Palpable dp pulses bilaterally, b/l LE foot wound dressing in place  Neurological: A&O x3  Skin: no rashes, ulcerations or lesions other than above   Musculoskeletal: Moving all extremities  Psychiatric: Responsive Transplant Surgery - Multidisciplinary Progress Note  --------------------------------------------------------------  DDRT 7/16/2023    Present: Patient seen and examined with multidisciplinary team including Transplant Surgeon:  Dr. Muñoz. transplant fellow Dr. Kitchen, Transplant Nephrologist: Dr. Aguilar,  Transplant Pharmacist: Edgardo  and unit RN during am rounds.  Disciplines not in attendance will be notified of the plan.     53M with past medical history significant for HTN, CHF (s/p AICD 2016--last interrogated 7/15/23), CAD (s/p CABG 2015), IDDM, PVD s/p stent x4 in RLE with R big toe/second toe amputation (2021) and ESRD on HD via LUE AVF for 6 years now s/p DDRT 7/16/2023 with Thymoglobulin induction. Post-transplant course complicated by severe constipation requiring disimpaction, DGF requiring hemodialysis and LUE swelling with concern for L subclavian thrombus on Eliquis, now s/p IR fistulogram 7/7/23 with balloon angioplasty of subclavian vein. He was discharged home on 7/27/23     Presented to [facility] ED from wound care clinic on 12/6/23 after concern for osteomyelitis from clinic providers.     Interval Events:  - Afebrile, VSS  - Heparin drip dced, resumed eliquis  - PICC placed for abx administration on discharge  - ON FS 98, decreased lantus to 5u      Immunosuppression:  Maintenance Immuno: Envarsus by level, MMF held, Pred 10mg    Potential Discharge date: TBD  Plan of care: see below        MEDICATIONS  (STANDING):  apixaban 5 milliGRAM(s) Oral every 12 hours  aspirin enteric coated 81 milliGRAM(s) Oral daily  atorvastatin 40 milliGRAM(s) Oral at bedtime  carvedilol 3.125 milliGRAM(s) Oral every 12 hours  famotidine    Tablet 20 milliGRAM(s) Oral daily  imipenem/cilastatin  IVPB      imipenem/cilastatin  IVPB 500 milliGRAM(s) IV Intermittent every 6 hours  insulin glargine Injectable (LANTUS) 10 Unit(s) SubCutaneous at bedtime  insulin lispro (ADMELOG) corrective regimen sliding scale   SubCutaneous three times a day before meals  insulin lispro Injectable (ADMELOG) 10 Unit(s) SubCutaneous three times a day with meals  mycophenolate mofetil 500 milliGRAM(s) Oral <User Schedule>  predniSONE   Tablet 10 milliGRAM(s) Oral daily  tacrolimus ER Tablet (ENVARSUS XR) 2 milliGRAM(s) Oral <User Schedule>  trimethoprim   80 mG/sulfamethoxazole 400 mG 1 Tablet(s) Oral daily    MEDICATIONS  (PRN):  acetaminophen     Tablet .. 650 milliGRAM(s) Oral every 6 hours PRN Mild Pain (1 - 3)  oxycodone    5 mG/acetaminophen 325 mG 1 Tablet(s) Oral every 4 hours PRN Moderate Pain (4 - 6)      PAST MEDICAL & SURGICAL HISTORY:  Diabetes mellitus  type 2      Foot ulcer due to secondary DM      Coronary artery disease      Acute on chronic systolic heart failure      H/O kidney transplant      Breast Reduction  at age 18      Toe amputation status, left      S/P CABG (coronary artery bypass graft)      AICD (automatic cardioverter/defibrillator) present          Vital Signs Last 24 Hrs  T(C): 36.8 (15 Dec 2023 14:53), Max: 37 (15 Dec 2023 01:00)  T(F): 98.2 (15 Dec 2023 14:53), Max: 98.6 (15 Dec 2023 01:00)  HR: 83 (15 Dec 2023 14:53) (62 - 83)  BP: 112/71 (15 Dec 2023 14:53) (112/71 - 161/76)  BP(mean): --  RR: 18 (15 Dec 2023 14:53) (18 - 18)  SpO2: 99% (15 Dec 2023 14:53) (98% - 100%)    Parameters below as of 15 Dec 2023 14:53  Patient On (Oxygen Delivery Method): room air        I&O's Summary    14 Dec 2023 07:01  -  15 Dec 2023 07:00  --------------------------------------------------------  IN: 880 mL / OUT: 2540 mL / NET: -1660 mL                              10.9   3.32  )-----------( 199      ( 15 Dec 2023 07:11 )             34.8     12-15    137  |  106  |  25<H>  ----------------------------<  104<H>  4.7   |  21<L>  |  1.11    Ca    10.3      15 Dec 2023 07:11  Phos  2.0     12-15  Mg     1.9     12-15    TPro  6.1  /  Alb  3.4  /  TBili  0.3  /  DBili  x   /  AST  13  /  ALT  9<L>  /  AlkPhos  103  12-15    Tacrolimus (), Serum: 4.9 ng/mL (12-15 @ 07:11)        Culture - Tissue with Gram Stain (collected 12-11-23 @ 21:16)  Source: .Tissue Other  Gram Stain (12-12-23 @ 01:58):    Numerous polymorphonuclear leukocytes seen per low power field    No organisms seen per oil power field  Preliminary Report (12-12-23 @ 20:46):    No growth    Culture - Tissue with Gram Stain (collected 12-11-23 @ 21:16)  Source: .Tissue Other  Gram Stain (12-12-23 @ 22:53):    No polymorphonuclear cells seen per low power field    Rare Gram Variable Coccobacilli seen per oil power field  Preliminary Report (12-13-23 @ 21:35):    Rare Klebsiella pneumoniae ESBL    Few Staphylococcus epidermidis  Organism: Klebsiella pneumoniae ESBL  Staphylococcus epidermidis (12-13-23 @ 21:32)  Organism: Staphylococcus epidermidis (12-13-23 @ 21:32)  Organism: Klebsiella pneumoniae ESBL (12-13-23 @ 21:26)        Review of systems  All other systems were reviewed and are negative, except as noted.     PHYSICAL EXAM:  Constitutional: Well developed  Eyes: Anicteric, PERRLA  ENMT: nc/at  Neck: supple  Respiratory: CTA B/L  Cardiovascular: RRR  Gastrointestinal: Soft, ND/NT   Genitourinary: Voiding spontaneously  Extremities: SCD's in place and working bilaterally  Vascular: Palpable dp pulses bilaterally, b/l LE foot wound dressing in place  Neurological: A&O x3  Skin: no rashes, ulcerations or lesions other than above   Musculoskeletal: Moving all extremities  Psychiatric: Responsive

## 2023-12-15 NOTE — PROGRESS NOTE ADULT - ATTENDING COMMENTS
53 yr old man with ESRD on HD for 6 years. S/p DDRT on 7/14/23, initial DGF needing HD until 8/7/23.   H/o ABMR 8/2023 with ~ 6000 class II DSA treated with steroids, plex/ivig and ritux.   Recovered graft function  to baseline creatinine of 1.3mg/dL.   PMH: DMII on insulin, CAD, CHF, s/p CABG 2015, AICD (2016), PVD, is s/p 4 stents in R leg , right big toe and second toe amputation 2021.   He is admitted with osteomyelitis. He is on IV imipenem/cilastatin  He underwent Right foot partial 3rd ray resection and left foot wound debridement on 12/11.  After discussion b/n podiatry and vascular angiogram deferred at this time as there is adequate flow and good healing.     - S/p DDRT 7/2023 with early ABMR treated, creatinine down o 1.37 today stable.   - Immunosuppression s/p thymo induction, rx for ABMR with plex/ivig/ritux 8/2023    Envarsus was on hold for elevated tac level of, resumed at 1mg, level 3.2 today, increase to 2mg.      MMF on hold for infection, continue prednisone 10mg daily.     Continue bactrim for PCP prophylaxis. CMV PCR negative on 12/7. Valcyte d/siena   - Osteomyelitis - continue imipenem/cilastatin. Needs PICC line for long term antibiotics. He is cleared by us for PICC placement.   - DM II continue Lantus and Lispro   - HTN - continue low dose coreg 3.125mg po q12h   - CAD/PAD- continue ASA 81, atorvastatin 40. After discussion b/n podiatry and vascular angiogram deferred at this time as there is adequate flow and good healing.  - D/c planning once home IV set up
53 yr old man with ESRD on HD for 6 years. S/p DDRT on 7/14/23, initial DGF needing HD until 8/7/23.   H/o ABMR 8/2023 with ~ 6000 class II DSA treated with steroids, plex/ivig and ritux.   Recovered graft function  to baseline creatinine of 1.3mg/dL.   PMH: DMII on insulin, CAD, CHF, s/p CABG 2015, AICD (2016), PVD, is s/p 4 stents in R leg , right big toe and second toe amputation 2021.   He is admitted with osteomyelitis. He is on IV imipenem/cilastatin  He underwent Right foot partial 3rd ray resection and left foot wound debridement on 12/11.  After discussion b/n podiatry and vascular angiogram deferred at this time as there is adequate flow and good healing.     - S/p DDRT 7/2023 with early ABMR treated, creatinine down to 1.1 today stable.   - Immunosuppression s/p thymo induction, rx for ABMR with plex/ivig/ritux 8/2023    On Envarsus 2mg daily level 4.9 today up from 3.2. Continue same dose for now, goal is 8-10    Resume MMF at 500mg po bid, reduce prednisone back to 5mg daily.     Continue bactrim for PCP prophylaxis. CMV PCR negative on 12/7. Valcyte d/siena   - Osteomyelitis - continue imipenem/cilastatin- check with ID if it can be changed to ertapenem for once a day dosing.  PICC line placed yesterday.   - DM II continue Lantus and Lispro   - HTN - continue low dose coreg 3.125mg po q12h   - CAD/PAD- continue ASA 81, atorvastatin 40. After discussion b/n podiatry and vascular angiogram deferred at this time as there is adequate flow and good healing.  - D/c planning today once home iv is set up.  - F/u in transplant clinic with Dr. Hairston within 2 weeks
VARSHA Malone MD have participated in the daily care of this patient  and have seen and examined the patient today and agree with  the  evaluation, assessment and plan of the surgical house officer  VARSHA Malone MD have personally seen and examined the patient at bedside today at  2  pm
53 yr old man with ESRD on HD for 6 years. S/p DDRT on 7/14/23, initial DGF needing HD until 8/7/23.   H/o ABMR 8/2023 with ~ 6000 class II DSA treated with steroids, plex/ivig and ritux.   Recovered graft function  to baseline creatinine of 1.3mg/dL.   PMH: DMII on insulin, CAD, CHF, s/p CABG 2015, AICD (2016), PVD, is s/p 4 stents in R leg , right big toe and second toe amputation 2021.   He is admitted with osteomyelitis. Planned for toe amputation and debridement. Also planned for angiogram sometime this week.     - S/p DDRT 7/2023 with early ABMR treated, now with stable graft function Cr 1.3mg/dL  - Immunosuppression s/p thymo induction, rx for ABMR with plex/ivig/ritux 8/2023    Envarsus on hold for elevated tac level of 12.0,  MMF on hold for infection, continue prednisone 10mg daily.     Continue bactrim for PCP prophylaxis. CMV PCR negative on 12/7. Valcyte d/siena   - Osteomyelitis - continue imipenem/cilastatin. Amputation and debridement planned.  - DM II p continue Lantus and Lispro   - HTN - continue low dose coreg 3.125mg po q12h   - CAD/PAD- continue ASA 81, atorvastatin 40, apixaban
VARSHA Malone MD have participated in the daily care of this patient  and have seen and examined the patient today and agree with  the  evaluation, assessment and plan of the surgical house officer  VARSHA Malone MD have personally seen and examined the patient at bedside today at 6 pm
53 yr old man with ESRD on HD for 6 years. S/p DDRT on 7/14/23, initial DGF needing HD until 8/7/23.   H/o ABMR 8/2023 with ~ 6000 class II DSA treated with steroids, plex/ivig and ritux.   Recovered graft function  to baseline creatinine of 1.3mg/dL.   PMH: DMII on insulin, CAD, CHF, s/p CABG 2015, AICD (2016), PVD, is s/p 4 stents in R leg , right big toe and second toe amputation 2021.   He is admitted with osteomyelitis.   He underwent Right foot partial 3rd ray resection and left foot wound debridement on 12/11.  Planned for angiogram sometime this week.     - S/p DDRT 7/2023 with early ABMR treated, now with mild GIN cr up to 1.5 from 1.3mg/dL  likely due to high tac level  - Immunosuppression s/p thymo induction, rx for ABMR with plex/ivig/ritux 8/2023    Envarsus was on hold for elevated tac level of, level down to 6.6 today. Resume Envarsus 1mg today. MMF on hold for infection, continue prednisone 10mg daily.     Continue bactrim for PCP prophylaxis. CMV PCR negative on 12/7. Valcyte d/siena   - Osteomyelitis - continue imipenem/cilastatin. Podiatry and ID follow up.   - DM II continue Lantus and Lispro   - HTN - continue low dose coreg 3.125mg po q12h   - CAD/PAD- continue ASA 81, atorvastatin 40, apixaban .
VARSHA Malone MD have participated in the daily care of this patient and have performed a history and physical exam of the patient and discussed  the findings and plan with the house officer. I reviewed the resident note and agree with the findings and plan   I Leonardo Malone MD have personally seen and examined the patient at bedside today at  8 pm
VARSHA Malone MD have participated in the daily care of this patient and have performed a history and physical exam of the patient and discussed  the findings and plan with the house officer. I reviewed the resident note and agree with the findings and plan   I Leonardo Malone MD have personally seen and examined the patient at bedside today at  2 pm

## 2023-12-15 NOTE — PROGRESS NOTE ADULT - ASSESSMENT
ASSESSMENT: 52 y/o M with PMH of HTN, CHF (s/p AICD 2016), CAD (s/p CABG 2015), IDDM, PVD s/p stent x4 in RLE with R big toe/second toe amputation (2021) and ESRD on HD via LUE AVF for 6 years now s/p DDRT 7/16/2023 presenting to the ED with infected R 3rd toe and left 1st metatarsal ulcers. Patient follows with Dr. Malone of Vascular Surgery. Patient states he has had these wounds for a while, follows with Podiatry. However, states that over the past few weeks, they have begun to look worse, specifically the left 1st metatarsal ulcer. Vascular surgery consulted for evaluation.    -pt is s/p rue picc line placement  abx as per id and  podiatry  f/u as outpt  reconsult prn   ASSESSMENT: 54 y/o M with PMH of HTN, CHF (s/p AICD 2016), CAD (s/p CABG 2015), IDDM, PVD s/p stent x4 in RLE with R big toe/second toe amputation (2021) and ESRD on HD via LUE AVF for 6 years now s/p DDRT 7/16/2023 presenting to the ED with infected R 3rd toe and left 1st metatarsal ulcers. Patient follows with Dr. Malone of Vascular Surgery. Patient states he has had these wounds for a while, follows with Podiatry. However, states that over the past few weeks, they have begun to look worse, specifically the left 1st metatarsal ulcer. Vascular surgery consulted for evaluation.    -pt is s/p rue picc line placement  abx as per id and  podiatry  f/u as outpt  reconsult prn

## 2023-12-15 NOTE — DISCHARGE NOTE NURSING/CASE MANAGEMENT/SOCIAL WORK - NSSCNAMETXT_GEN_ALL_CORE
[facility] ([facility] and [facility] [place] [facility]) [facility] ([facility]) and [facility] [place] ([facility])

## 2023-12-15 NOTE — DISCHARGE NOTE NURSING/CASE MANAGEMENT/SOCIAL WORK - NSDCFUADDAPPT_GEN_ALL_CORE_FT
Podiatry Discharge Instructions:  Follow up: Please follow up with Dr. Myers within 1 week of discharge from the hospital, please call [phone] for appointment and discuss that you recently were seen in the hospital.  Wound Care: Please leave your dressing clean dry intact until your follow up appointment   Weight bearing: Please weight bear as tolerated in a surgical shoe.  Antibiotics: Please continue as instructed. Podiatry Discharge Instructions:  Follow up: Please follow up with Dr. Myers within 1 week of discharge from the hospital, please call [phone] for appointment and discuss that you recently were seen in the hospital.  Wound Care: Please leave your dressing clean dry intact until your follow up appointment   Weight bearing: Please weight bear as tolerated in a surgical shoe.  Antibiotics: Please continue as instructed.

## 2023-12-15 NOTE — DISCHARGE NOTE NURSING/CASE MANAGEMENT/SOCIAL WORK - PATIENT PORTAL LINK FT
You can access the FollowMyHealth Patient Portal offered by [facility] by registering at the following website: [email or web address]. By joining "[employer]"’s FollowMyHealth portal, you will also be able to view your health information using other applications (apps) compatible with our system. You can access the FollowMyHealth Patient Portal offered by [facility] by registering at the following website: [email or web address]. By joining [employer]’s FollowMyHealth portal, you will also be able to view your health information using other applications (apps) compatible with our system.

## 2023-12-15 NOTE — PROGRESS NOTE ADULT - PROBLEM SELECTOR PLAN 2
VARSHA Malone MD have participated in the daily care and management of this patient and have personally  seen and examined the patient today and have noted the findings and formulated the plan of care.  I Leonardo Malone MD have personally seen and examined the patient at bedside today at  1  pm
VARSHA Malone MD have participated in the daily care of this patient  and have seen and examined the patient today and agree with  the  evaluation, assessment and plan of the surgical house officer  VARSHA Malone MD have personally seen and examined the patient at bedside today at 6 pm
VARSHA Malone MD have participated in the daily care and management of this patient and have personally  seen and examined the patient today and have noted the findings and formulated the plan of care.  I Leonardo Malone MD have personally seen and examined the patient at bedside today at 9am

## 2023-12-15 NOTE — PROGRESS NOTE ADULT - PROBLEM SELECTOR PROBLEM 1
Arterial insufficiency with ischemic ulcer

## 2023-12-15 NOTE — PROGRESS NOTE ADULT - SUBJECTIVE AND OBJECTIVE BOX
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 (Unknown)    Intolerances    Standing Inpatient Medications  apixaban 5 milliGRAM(s) Oral every 12 hours  aspirin enteric coated 81 milliGRAM(s) Oral daily  atorvastatin 40 milliGRAM(s) Oral at bedtime  carvedilol 3.125 milliGRAM(s) Oral every 12 hours  famotidine    Tablet 20 milliGRAM(s) Oral daily  imipenem/cilastatin  IVPB      imipenem/cilastatin  IVPB 500 milliGRAM(s) IV Intermittent every 6 hours  insulin glargine Injectable (LANTUS) 10 Unit(s) SubCutaneous at bedtime  insulin lispro (ADMELOG) corrective regimen sliding scale   SubCutaneous three times a day before meals  insulin lispro Injectable (ADMELOG) 10 Unit(s) SubCutaneous three times a day with meals  mycophenolate mofetil 500 milliGRAM(s) Oral <User Schedule>  predniSONE   Tablet 10 milliGRAM(s) Oral daily  tacrolimus ER Tablet (ENVARSUS XR) 2 milliGRAM(s) Oral <User Schedule>  trimethoprim   80 mG/sulfamethoxazole 400 mG 1 Tablet(s) Oral daily    PRN Inpatient Medications  acetaminophen     Tablet .. 650 milliGRAM(s) Oral every 6 hours PRN  oxycodone    5 mG/acetaminophen 325 mG 1 Tablet(s) Oral every 4 hours PRN    REVIEW OF SYSTEMS  --------------------------------------------------------------------------------  Gen: No fevers/chills  Respiratory: No dyspnea, cough,   CV: No chest pain  GI: No abdominal pain, diarrhea, constipation, nausea, vomiting  Transplant: No pain  : No increased frequency, dysuria, hematuria   MSK: No edema  Neuro: No dizziness/lightheadedness    All other systems were reviewed and are negative, except as noted.    VITALS/PHYSICAL EXAM  --------------------------------------------------------------------------------  T(C): 36.9 (12-15-23 @ 10:22), Max: 37 (12-15-23 @ 01:00)  HR: 68 (12-15-23 @ 10:22) (62 - 74)  BP: 127/77 (12-15-23 @ 10:22) (127/77 - 161/76)  RR: 18 (12-15-23 @ 06:13) (18 - 18)  SpO2: 100% (12-15-23 @ 06:13) (98% - 100%)  Wt(kg): --    12-14-23 @ 07:01  -  12-15-23 @ 07:00  --------------------------------------------------------  IN: 880 mL / OUT: 2540 mL / NET: -1660 mL    Physical Exam:  Gen: NAD  HEENT: Anicteric  Pulm: CTA B/L  CV: RRR, no murmur appreciated  Abd: +BS, soft and non distended abdomen. No tenderness to palpation. Transplant non tender, no bruit  Extremities: No edema, toe amputations, dressing on B/L LE  Skin: warm, stasis dermatitis  Neuro: Awake and alert    LABS/STUDIES  --------------------------------------------------------------------------------              10.9   3.32  >-----------<  199      [12-15-23 @ 07:11]              34.8     137  |  106  |  25  ----------------------------<  104      [12-15-23 @ 07:11]  4.7   |  21  |  1.11        Ca     10.3     [12-15-23 @ 07:11]      Mg     1.9     [12-15-23 @ 07:11]      Phos  2.0     [12-15-23 @ 07:11]    TPro  6.1  /  Alb  3.4  /  TBili  0.3  /  DBili  x   /  AST  13  /  ALT  9   /  AlkPhos  103  [12-15-23 @ 07:11]    PT/INR: PT 12.7 , INR 1.16       [12-14-23 @ 05:00]  PTT: 36.5       [12-15-23 @ 07:11]    Creatinine Trend:  SCr 1.11 [12-15 @ 07:11]  SCr 1.10 [12-14 @ 05:00]  SCr 1.37 [12-13 @ 07:10]  SCr 1.54 [12-12 @ 07:00]  SCr 1.30 [12-11 @ 06:24]    Tacrolimus (), Serum: 4.9 ng/mL (12-15 @ 07:11)  Tacrolimus (), Serum: 3.4 ng/mL (12-14 @ 05:00)  Tacrolimus (), Serum: 6.2 ng/mL (12-13 @ 07:11)  Tacrolimus (), Serum: 6.6 ng/mL (12-12 @ 07:06)    Urinalysis - [12-15-23 @ 07:11]      Color  / Appearance  / SG  / pH       Gluc 104 / Ketone   / Bili  / Urobili        Blood  / Protein  / Leuk Est  / Nitrite       RBC  / WBC  / Hyaline  / Gran  / Sq Epi  / Non Sq Epi  / Bacteria     Iron 68, TIBC 185, %sat 37      [08-16-23 @ 06:39]  Ferritin 1010      [08-16-23 @ 06:39]  PTH -- (Ca 10.6)      [11-02-23 @ 11:56]   220  Vitamin D (25OH) 18.0      [11-02-23 @ 11:56] [facility] DIVISION OF KIDNEY DISEASES AND HYPERTENSION -- FOLLOW UP NOTE  --------------------------------------------------------------------------------    24 hour events/subjective:  -No acute events overnight  -Pt with osteomyelitis of toes   -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 (Unknown)    Intolerances    Standing Inpatient Medications  apixaban 5 milliGRAM(s) Oral every 12 hours  aspirin enteric coated 81 milliGRAM(s) Oral daily  atorvastatin 40 milliGRAM(s) Oral at bedtime  carvedilol 3.125 milliGRAM(s) Oral every 12 hours  famotidine    Tablet 20 milliGRAM(s) Oral daily  imipenem/cilastatin  IVPB      imipenem/cilastatin  IVPB 500 milliGRAM(s) IV Intermittent every 6 hours  insulin glargine Injectable (LANTUS) 10 Unit(s) SubCutaneous at bedtime  insulin lispro (ADMELOG) corrective regimen sliding scale   SubCutaneous three times a day before meals  insulin lispro Injectable (ADMELOG) 10 Unit(s) SubCutaneous three times a day with meals  mycophenolate mofetil 500 milliGRAM(s) Oral <User Schedule>  predniSONE   Tablet 10 milliGRAM(s) Oral daily  tacrolimus ER Tablet (ENVARSUS XR) 2 milliGRAM(s) Oral <User Schedule>  trimethoprim   80 mG/sulfamethoxazole 400 mG 1 Tablet(s) Oral daily    PRN Inpatient Medications  acetaminophen     Tablet .. 650 milliGRAM(s) Oral every 6 hours PRN  oxycodone    5 mG/acetaminophen 325 mG 1 Tablet(s) Oral every 4 hours PRN    REVIEW OF SYSTEMS  --------------------------------------------------------------------------------  Gen: No fevers/chills  Respiratory: No dyspnea, cough,   CV: No chest pain  GI: No abdominal pain, diarrhea, constipation, nausea, vomiting  Transplant: No pain  : No increased frequency, dysuria, hematuria   MSK: No edema  Neuro: No dizziness/lightheadedness    All other systems were reviewed and are negative, except as noted.    VITALS/PHYSICAL EXAM  --------------------------------------------------------------------------------  T(C): 36.9 (12-15-23 @ 10:22), Max: 37 (12-15-23 @ 01:00)  HR: 68 (12-15-23 @ 10:22) (62 - 74)  BP: 127/77 (12-15-23 @ 10:22) (127/77 - 161/76)  RR: 18 (12-15-23 @ 06:13) (18 - 18)  SpO2: 100% (12-15-23 @ 06:13) (98% - 100%)  Wt(kg): --    12-14-23 @ 07:01  -  12-15-23 @ 07:00  --------------------------------------------------------  IN: 880 mL / OUT: 2540 mL / NET: -1660 mL    Physical Exam:  Gen: NAD  HEENT: Anicteric  Pulm: CTA B/L  CV: RRR, no murmur appreciated  Abd: +BS, soft and non distended abdomen. No tenderness to palpation. Transplant non tender, no bruit  Extremities: No edema, toe amputations, dressing on B/L LE  Skin: warm, stasis dermatitis  Neuro: Awake and alert    LABS/STUDIES  --------------------------------------------------------------------------------              10.9   3.32  >-----------<  199      [12-15-23 @ 07:11]              34.8     137  |  106  |  25  ----------------------------<  104      [12-15-23 @ 07:11]  4.7   |  21  |  1.11        Ca     10.3     [12-15-23 @ 07:11]      Mg     1.9     [12-15-23 @ 07:11]      Phos  2.0     [12-15-23 @ 07:11]    TPro  6.1  /  Alb  3.4  /  TBili  0.3  /  DBili  x   /  AST  13  /  ALT  9   /  AlkPhos  103  [12-15-23 @ 07:11]    PT/INR: PT 12.7 , INR 1.16       [12-14-23 @ 05:00]  PTT: 36.5       [12-15-23 @ 07:11]    Creatinine Trend:  SCr 1.11 [12-15 @ 07:11]  SCr 1.10 [12-14 @ 05:00]  SCr 1.37 [12-13 @ 07:10]  SCr 1.54 [12-12 @ 07:00]  SCr 1.30 [12-11 @ 06:24]    Tacrolimus (), Serum: 4.9 ng/mL (12-15 @ 07:11)  Tacrolimus (), Serum: 3.4 ng/mL (12-14 @ 05:00)  Tacrolimus (), Serum: 6.2 ng/mL (12-13 @ 07:11)  Tacrolimus (), Serum: 6.6 ng/mL (12-12 @ 07:06)    Urinalysis - [12-15-23 @ 07:11]      Color  / Appearance  / SG  / pH       Gluc 104 / Ketone   / Bili  / Urobili        Blood  / Protein  / Leuk Est  / Nitrite       RBC  / WBC  / Hyaline  / Gran  / Sq Epi  / Non Sq Epi  / Bacteria     Iron 68, TIBC 185, %sat 37      [08-16-23 @ 06:39]  Ferritin 1010      [08-16-23 @ 06:39]  PTH -- (Ca 10.6)      [11-02-23 @ 11:56]   220  Vitamin D (25OH) 18.0      [11-02-23 @ 11:56]

## 2023-12-15 NOTE — DISCHARGE NOTE NURSING/CASE MANAGEMENT/SOCIAL WORK - NSDCPEFALRISK_GEN_ALL_CORE
For information on Fall & Injury Prevention, visit: [email or web address] OR  [email or web address] OR  https://www.cdc.gov/steadi/patient.html For information on Fall & Injury Prevention, visit: [email or web address] OR  [email or web address] OR  https://www.cdc.gov/steadi/patient.html

## 2023-12-16 VITALS
SYSTOLIC BLOOD PRESSURE: 109 MMHG | OXYGEN SATURATION: 97 % | TEMPERATURE: 97 F | DIASTOLIC BLOOD PRESSURE: 74 MMHG | HEART RATE: 75 BPM | RESPIRATION RATE: 18 BRPM

## 2023-12-16 LAB
ALBUMIN SERPL ELPH-MCNC: 3.4 G/DL — SIGNIFICANT CHANGE UP (ref 3.3–5)
ALBUMIN SERPL ELPH-MCNC: 3.4 G/DL — SIGNIFICANT CHANGE UP (ref 3.3–5)
ALP SERPL-CCNC: 99 U/L — SIGNIFICANT CHANGE UP (ref 40–120)
ALP SERPL-CCNC: 99 U/L — SIGNIFICANT CHANGE UP (ref 40–120)
ALT FLD-CCNC: <5 U/L — LOW (ref 10–45)
ALT FLD-CCNC: <5 U/L — LOW (ref 10–45)
ANION GAP SERPL CALC-SCNC: 10 MMOL/L — SIGNIFICANT CHANGE UP (ref 5–17)
ANION GAP SERPL CALC-SCNC: 10 MMOL/L — SIGNIFICANT CHANGE UP (ref 5–17)
APTT BLD: 37.7 SEC — HIGH (ref 24.5–35.6)
APTT BLD: 37.7 SEC — HIGH (ref 24.5–35.6)
AST SERPL-CCNC: 9 U/L — LOW (ref 10–40)
AST SERPL-CCNC: 9 U/L — LOW (ref 10–40)
BASOPHILS # BLD AUTO: 0.05 K/UL — SIGNIFICANT CHANGE UP (ref 0–0.2)
BASOPHILS # BLD AUTO: 0.05 K/UL — SIGNIFICANT CHANGE UP (ref 0–0.2)
BASOPHILS NFR BLD AUTO: 1.6 % — SIGNIFICANT CHANGE UP (ref 0–2)
BASOPHILS NFR BLD AUTO: 1.6 % — SIGNIFICANT CHANGE UP (ref 0–2)
BILIRUB SERPL-MCNC: 0.3 MG/DL — SIGNIFICANT CHANGE UP (ref 0.2–1.2)
BILIRUB SERPL-MCNC: 0.3 MG/DL — SIGNIFICANT CHANGE UP (ref 0.2–1.2)
BUN SERPL-MCNC: 33 MG/DL — HIGH (ref 7–23)
BUN SERPL-MCNC: 33 MG/DL — HIGH (ref 7–23)
CALCIUM SERPL-MCNC: 10.6 MG/DL — HIGH (ref 8.4–10.5)
CALCIUM SERPL-MCNC: 10.6 MG/DL — HIGH (ref 8.4–10.5)
CHLORIDE SERPL-SCNC: 102 MMOL/L — SIGNIFICANT CHANGE UP (ref 96–108)
CHLORIDE SERPL-SCNC: 102 MMOL/L — SIGNIFICANT CHANGE UP (ref 96–108)
CO2 SERPL-SCNC: 22 MMOL/L — SIGNIFICANT CHANGE UP (ref 22–31)
CO2 SERPL-SCNC: 22 MMOL/L — SIGNIFICANT CHANGE UP (ref 22–31)
CREAT SERPL-MCNC: 1.32 MG/DL — HIGH (ref 0.5–1.3)
CREAT SERPL-MCNC: 1.32 MG/DL — HIGH (ref 0.5–1.3)
CULTURE RESULTS: ABNORMAL
CULTURE RESULTS: ABNORMAL
CULTURE RESULTS: SIGNIFICANT CHANGE UP
CULTURE RESULTS: SIGNIFICANT CHANGE UP
EGFR: 65 ML/MIN/1.73M2 — SIGNIFICANT CHANGE UP
EGFR: 65 ML/MIN/1.73M2 — SIGNIFICANT CHANGE UP
EOSINOPHIL # BLD AUTO: 0 K/UL — SIGNIFICANT CHANGE UP (ref 0–0.5)
EOSINOPHIL # BLD AUTO: 0 K/UL — SIGNIFICANT CHANGE UP (ref 0–0.5)
EOSINOPHIL NFR BLD AUTO: 0 % — SIGNIFICANT CHANGE UP (ref 0–6)
EOSINOPHIL NFR BLD AUTO: 0 % — SIGNIFICANT CHANGE UP (ref 0–6)
GLUCOSE BLDC GLUCOMTR-MCNC: 149 MG/DL — HIGH (ref 70–99)
GLUCOSE BLDC GLUCOMTR-MCNC: 149 MG/DL — HIGH (ref 70–99)
GLUCOSE SERPL-MCNC: 133 MG/DL — HIGH (ref 70–99)
GLUCOSE SERPL-MCNC: 133 MG/DL — HIGH (ref 70–99)
HCT VFR BLD CALC: 32.8 % — LOW (ref 39–50)
HCT VFR BLD CALC: 32.8 % — LOW (ref 39–50)
HGB BLD-MCNC: 10.6 G/DL — LOW (ref 13–17)
HGB BLD-MCNC: 10.6 G/DL — LOW (ref 13–17)
IMM GRANULOCYTES NFR BLD AUTO: 2 % — HIGH (ref 0–0.9)
IMM GRANULOCYTES NFR BLD AUTO: 2 % — HIGH (ref 0–0.9)
INR BLD: 1.26 RATIO — HIGH (ref 0.85–1.18)
INR BLD: 1.26 RATIO — HIGH (ref 0.85–1.18)
LYMPHOCYTES # BLD AUTO: 0.29 K/UL — LOW (ref 1–3.3)
LYMPHOCYTES # BLD AUTO: 0.29 K/UL — LOW (ref 1–3.3)
LYMPHOCYTES # BLD AUTO: 9.5 % — LOW (ref 13–44)
LYMPHOCYTES # BLD AUTO: 9.5 % — LOW (ref 13–44)
MAGNESIUM SERPL-MCNC: 1.9 MG/DL — SIGNIFICANT CHANGE UP (ref 1.6–2.6)
MAGNESIUM SERPL-MCNC: 1.9 MG/DL — SIGNIFICANT CHANGE UP (ref 1.6–2.6)
MCHC RBC-ENTMCNC: 28.6 PG — SIGNIFICANT CHANGE UP (ref 27–34)
MCHC RBC-ENTMCNC: 28.6 PG — SIGNIFICANT CHANGE UP (ref 27–34)
MCHC RBC-ENTMCNC: 32.3 GM/DL — SIGNIFICANT CHANGE UP (ref 32–36)
MCHC RBC-ENTMCNC: 32.3 GM/DL — SIGNIFICANT CHANGE UP (ref 32–36)
MCV RBC AUTO: 88.6 FL — SIGNIFICANT CHANGE UP (ref 80–100)
MCV RBC AUTO: 88.6 FL — SIGNIFICANT CHANGE UP (ref 80–100)
MONOCYTES # BLD AUTO: 0.69 K/UL — SIGNIFICANT CHANGE UP (ref 0–0.9)
MONOCYTES # BLD AUTO: 0.69 K/UL — SIGNIFICANT CHANGE UP (ref 0–0.9)
MONOCYTES NFR BLD AUTO: 22.5 % — HIGH (ref 2–14)
MONOCYTES NFR BLD AUTO: 22.5 % — HIGH (ref 2–14)
NEUTROPHILS # BLD AUTO: 1.97 K/UL — SIGNIFICANT CHANGE UP (ref 1.8–7.4)
NEUTROPHILS # BLD AUTO: 1.97 K/UL — SIGNIFICANT CHANGE UP (ref 1.8–7.4)
NEUTROPHILS NFR BLD AUTO: 64.4 % — SIGNIFICANT CHANGE UP (ref 43–77)
NEUTROPHILS NFR BLD AUTO: 64.4 % — SIGNIFICANT CHANGE UP (ref 43–77)
NRBC # BLD: 0 /100 WBCS — SIGNIFICANT CHANGE UP (ref 0–0)
NRBC # BLD: 0 /100 WBCS — SIGNIFICANT CHANGE UP (ref 0–0)
ORGANISM # SPEC MICROSCOPIC CNT: ABNORMAL
PHOSPHATE SERPL-MCNC: 2.1 MG/DL — LOW (ref 2.5–4.5)
PHOSPHATE SERPL-MCNC: 2.1 MG/DL — LOW (ref 2.5–4.5)
PLATELET # BLD AUTO: 191 K/UL — SIGNIFICANT CHANGE UP (ref 150–400)
PLATELET # BLD AUTO: 191 K/UL — SIGNIFICANT CHANGE UP (ref 150–400)
POTASSIUM SERPL-MCNC: 4.5 MMOL/L — SIGNIFICANT CHANGE UP (ref 3.5–5.3)
POTASSIUM SERPL-MCNC: 4.5 MMOL/L — SIGNIFICANT CHANGE UP (ref 3.5–5.3)
POTASSIUM SERPL-SCNC: 4.5 MMOL/L — SIGNIFICANT CHANGE UP (ref 3.5–5.3)
POTASSIUM SERPL-SCNC: 4.5 MMOL/L — SIGNIFICANT CHANGE UP (ref 3.5–5.3)
PROT SERPL-MCNC: 5.8 G/DL — LOW (ref 6–8.3)
PROT SERPL-MCNC: 5.8 G/DL — LOW (ref 6–8.3)
PROTHROM AB SERPL-ACNC: 13.8 SEC — HIGH (ref 9.5–13)
PROTHROM AB SERPL-ACNC: 13.8 SEC — HIGH (ref 9.5–13)
RBC # BLD: 3.7 M/UL — LOW (ref 4.2–5.8)
RBC # BLD: 3.7 M/UL — LOW (ref 4.2–5.8)
RBC # FLD: 13 % — SIGNIFICANT CHANGE UP (ref 10.3–14.5)
RBC # FLD: 13 % — SIGNIFICANT CHANGE UP (ref 10.3–14.5)
SODIUM SERPL-SCNC: 134 MMOL/L — LOW (ref 135–145)
SODIUM SERPL-SCNC: 134 MMOL/L — LOW (ref 135–145)
SPECIMEN SOURCE: SIGNIFICANT CHANGE UP
TACROLIMUS SERPL-MCNC: 6.9 NG/ML — SIGNIFICANT CHANGE UP
TACROLIMUS SERPL-MCNC: 6.9 NG/ML — SIGNIFICANT CHANGE UP
WBC # BLD: 3.06 K/UL — LOW (ref 3.8–10.5)
WBC # BLD: 3.06 K/UL — LOW (ref 3.8–10.5)
WBC # FLD AUTO: 3.06 K/UL — LOW (ref 3.8–10.5)
WBC # FLD AUTO: 3.06 K/UL — LOW (ref 3.8–10.5)

## 2023-12-16 PROCEDURE — 85027 COMPLETE CBC AUTOMATED: CPT

## 2023-12-16 PROCEDURE — 87641 MR-STAPH DNA AMP PROBE: CPT

## 2023-12-16 PROCEDURE — 83735 ASSAY OF MAGNESIUM: CPT

## 2023-12-16 PROCEDURE — 93925 LOWER EXTREMITY STUDY: CPT

## 2023-12-16 PROCEDURE — 71045 X-RAY EXAM CHEST 1 VIEW: CPT

## 2023-12-16 PROCEDURE — 93005 ELECTROCARDIOGRAM TRACING: CPT

## 2023-12-16 PROCEDURE — 96376 TX/PRO/DX INJ SAME DRUG ADON: CPT

## 2023-12-16 PROCEDURE — 73718 MRI LOWER EXTREMITY W/O DYE: CPT

## 2023-12-16 PROCEDURE — 80197 ASSAY OF TACROLIMUS: CPT

## 2023-12-16 PROCEDURE — 88311 DECALCIFY TISSUE: CPT

## 2023-12-16 PROCEDURE — 87184 SC STD DISK METHOD PER PLATE: CPT

## 2023-12-16 PROCEDURE — 87070 CULTURE OTHR SPECIMN AEROBIC: CPT

## 2023-12-16 PROCEDURE — 71046 X-RAY EXAM CHEST 2 VIEWS: CPT

## 2023-12-16 PROCEDURE — 87077 CULTURE AEROBIC IDENTIFY: CPT

## 2023-12-16 PROCEDURE — 87640 STAPH A DNA AMP PROBE: CPT

## 2023-12-16 PROCEDURE — 87181 SC STD AGAR DILUTION PER AGT: CPT

## 2023-12-16 PROCEDURE — 93923 UPR/LXTR ART STDY 3+ LVLS: CPT

## 2023-12-16 PROCEDURE — 84100 ASSAY OF PHOSPHORUS: CPT

## 2023-12-16 PROCEDURE — 99261: CPT

## 2023-12-16 PROCEDURE — 86901 BLOOD TYPING SEROLOGIC RH(D): CPT

## 2023-12-16 PROCEDURE — 80048 BASIC METABOLIC PNL TOTAL CA: CPT

## 2023-12-16 PROCEDURE — 80202 ASSAY OF VANCOMYCIN: CPT

## 2023-12-16 PROCEDURE — 99285 EMERGENCY DEPT VISIT HI MDM: CPT

## 2023-12-16 PROCEDURE — 86850 RBC ANTIBODY SCREEN: CPT

## 2023-12-16 PROCEDURE — 36415 COLL VENOUS BLD VENIPUNCTURE: CPT

## 2023-12-16 PROCEDURE — 80053 COMPREHEN METABOLIC PANEL: CPT

## 2023-12-16 PROCEDURE — 88305 TISSUE EXAM BY PATHOLOGIST: CPT

## 2023-12-16 PROCEDURE — 36569 INSJ PICC 5 YR+ W/O IMAGING: CPT

## 2023-12-16 PROCEDURE — 85025 COMPLETE CBC W/AUTO DIFF WBC: CPT

## 2023-12-16 PROCEDURE — 86900 BLOOD TYPING SEROLOGIC ABO: CPT

## 2023-12-16 PROCEDURE — 96375 TX/PRO/DX INJ NEW DRUG ADDON: CPT

## 2023-12-16 PROCEDURE — 85730 THROMBOPLASTIN TIME PARTIAL: CPT

## 2023-12-16 PROCEDURE — 80180 DRUG SCRN QUAN MYCOPHENOLATE: CPT

## 2023-12-16 PROCEDURE — 97161 PT EVAL LOW COMPLEX 20 MIN: CPT

## 2023-12-16 PROCEDURE — 85610 PROTHROMBIN TIME: CPT

## 2023-12-16 PROCEDURE — 82962 GLUCOSE BLOOD TEST: CPT

## 2023-12-16 PROCEDURE — 85652 RBC SED RATE AUTOMATED: CPT

## 2023-12-16 PROCEDURE — 87040 BLOOD CULTURE FOR BACTERIA: CPT

## 2023-12-16 PROCEDURE — 87186 SC STD MICRODIL/AGAR DIL: CPT

## 2023-12-16 PROCEDURE — 87205 SMEAR GRAM STAIN: CPT

## 2023-12-16 PROCEDURE — 86140 C-REACTIVE PROTEIN: CPT

## 2023-12-16 PROCEDURE — 96374 THER/PROPH/DIAG INJ IV PUSH: CPT

## 2023-12-16 PROCEDURE — 87075 CULTR BACTERIA EXCEPT BLOOD: CPT

## 2023-12-16 PROCEDURE — 73630 X-RAY EXAM OF FOOT: CPT

## 2023-12-16 PROCEDURE — 99232 SBSQ HOSP IP/OBS MODERATE 35: CPT | Mod: 24

## 2023-12-16 RX ADMIN — CARVEDILOL PHOSPHATE 3.12 MILLIGRAM(S): 80 CAPSULE, EXTENDED RELEASE ORAL at 05:33

## 2023-12-16 RX ADMIN — Medication 10 MILLIGRAM(S): at 05:33

## 2023-12-16 RX ADMIN — Medication 81 MILLIGRAM(S): at 10:20

## 2023-12-16 RX ADMIN — Medication 10 UNIT(S): at 09:09

## 2023-12-16 RX ADMIN — Medication 1 TABLET(S): at 10:20

## 2023-12-16 RX ADMIN — MYCOPHENOLATE MOFETIL 500 MILLIGRAM(S): 250 CAPSULE ORAL at 09:02

## 2023-12-16 RX ADMIN — IMIPENEM AND CILASTATIN 100 MILLIGRAM(S): 250; 250 INJECTION, POWDER, FOR SOLUTION INTRAVENOUS at 05:32

## 2023-12-16 RX ADMIN — APIXABAN 5 MILLIGRAM(S): 2.5 TABLET, FILM COATED ORAL at 05:33

## 2023-12-16 RX ADMIN — FAMOTIDINE 20 MILLIGRAM(S): 10 INJECTION INTRAVENOUS at 10:20

## 2023-12-16 RX ADMIN — TACROLIMUS 2 MILLIGRAM(S): 5 CAPSULE ORAL at 09:02

## 2023-12-16 NOTE — PROGRESS NOTE ADULT - SUBJECTIVE AND OBJECTIVE BOX
Subjective: Patient seen and examined. No new events except as noted.     SUBJECTIVE/ROS:        MEDICATIONS:  MEDICATIONS  (STANDING):  apixaban 5 milliGRAM(s) Oral every 12 hours  aspirin enteric coated 81 milliGRAM(s) Oral daily  atorvastatin 40 milliGRAM(s) Oral at bedtime  carvedilol 3.125 milliGRAM(s) Oral every 12 hours  famotidine    Tablet 20 milliGRAM(s) Oral daily  imipenem/cilastatin  IVPB      imipenem/cilastatin  IVPB 500 milliGRAM(s) IV Intermittent every 6 hours  insulin glargine Injectable (LANTUS) 10 Unit(s) SubCutaneous at bedtime  insulin lispro (ADMELOG) corrective regimen sliding scale   SubCutaneous three times a day before meals  insulin lispro Injectable (ADMELOG) 10 Unit(s) SubCutaneous three times a day with meals  mycophenolate mofetil 500 milliGRAM(s) Oral <User Schedule>  predniSONE   Tablet 10 milliGRAM(s) Oral daily  tacrolimus ER Tablet (ENVARSUS XR) 2 milliGRAM(s) Oral <User Schedule>  trimethoprim   80 mG/sulfamethoxazole 400 mG 1 Tablet(s) Oral daily      PHYSICAL EXAM:  T(C): 36.5 (12-16-23 @ 05:25), Max: 37.1 (12-16-23 @ 00:15)  HR: 76 (12-16-23 @ 05:25) (66 - 83)  BP: 154/70 (12-16-23 @ 05:25) (112/71 - 154/70)  RR: 18 (12-16-23 @ 05:25) (18 - 18)  SpO2: 97% (12-16-23 @ 05:25) (95% - 100%)  Wt(kg): --  I&O's Summary    15 Dec 2023 07:01  -  16 Dec 2023 07:00  --------------------------------------------------------  IN: 480 mL / OUT: 1225 mL / NET: -745 mL            JVP: Normal  Neck: supple  Lung: clear   CV: S1 S2 , Murmur:  Abd: soft  Ext: No edema  neuro: Awake / alert  Psych: flat affect  Skin: normal``    LABS/DATA:    CARDIAC MARKERS:                                10.6   3.06  )-----------( 191      ( 16 Dec 2023 06:40 )             32.8     12-16    134<L>  |  102  |  33<H>  ----------------------------<  133<H>  4.5   |  22  |  1.32<H>    Ca    10.6<H>      16 Dec 2023 06:41  Phos  2.1     12-16  Mg     1.9     12-16    TPro  5.8<L>  /  Alb  3.4  /  TBili  0.3  /  DBili  x   /  AST  9<L>  /  ALT  <5<L>  /  AlkPhos  99  12-16    proBNP:   Lipid Profile:   HgA1c:   TSH:     TELE:  EKG:

## 2023-12-16 NOTE — PROGRESS NOTE ADULT - SUBJECTIVE AND OBJECTIVE BOX
Transplant Surgery - Multidisciplinary Progress Note  --------------------------------------------------------------  DDRT 7/16/2023    Present: Patient seen and examined with multidisciplinary team including Transplant Surgeon:  Dr. Muñoz. transplant fellow Dr. Kitchen, Transplant Nephrologist: Dr. Aguilar, [facility]  Transplant Pharmacist: Edgardo  and unit RN during am rounds.  Disciplines not in attendance will be notified of the plan.     53M with past medical history significant for HTN, CHF (s/p AICD 2016--last interrogated 7/15/23), CAD (s/p CABG 2015), IDDM, PVD s/p stent x4 in RLE with R big toe/second toe amputation (2021) and ESRD on HD via LUE AVF for 6 years now s/p DDRT 7/16/2023 with Thymoglobulin induction. Post-transplant course complicated by severe constipation requiring disimpaction, DGF requiring hemodialysis and LUE swelling with concern for L subclavian thrombus on Eliquis, now s/p IR fistulogram 7/7/23 with balloon angioplasty of subclavian vein. He was discharged home on 7/27/23     Presented to [facility] ED from wound care clinic on 12/6/23 after concern for osteomyelitis from clinic providers.     Interval Events:  - Afebrile, VSS  - Good urine output  -  BID      Immunosuppression:  Maintenance Immuno: Envarsus by level, MMF held, Pred 10mg    Potential Discharge date: TBD  Plan of care: see below          MEDICATIONS  (STANDING):  apixaban 5 milliGRAM(s) Oral every 12 hours  aspirin enteric coated 81 milliGRAM(s) Oral daily  atorvastatin 40 milliGRAM(s) Oral at bedtime  carvedilol 3.125 milliGRAM(s) Oral every 12 hours  famotidine    Tablet 20 milliGRAM(s) Oral daily  imipenem/cilastatin  IVPB 500 milliGRAM(s) IV Intermittent every 6 hours  imipenem/cilastatin  IVPB      insulin glargine Injectable (LANTUS) 10 Unit(s) SubCutaneous at bedtime  insulin lispro (ADMELOG) corrective regimen sliding scale   SubCutaneous three times a day before meals  insulin lispro Injectable (ADMELOG) 10 Unit(s) SubCutaneous three times a day with meals  mycophenolate mofetil 500 milliGRAM(s) Oral <User Schedule>  predniSONE   Tablet 10 milliGRAM(s) Oral daily  tacrolimus ER Tablet (ENVARSUS XR) 2 milliGRAM(s) Oral <User Schedule>  trimethoprim   80 mG/sulfamethoxazole 400 mG 1 Tablet(s) Oral daily    MEDICATIONS  (PRN):  acetaminophen     Tablet .. 650 milliGRAM(s) Oral every 6 hours PRN Mild Pain (1 - 3)  oxycodone    5 mG/acetaminophen 325 mG 1 Tablet(s) Oral every 4 hours PRN Moderate Pain (4 - 6)      PAST MEDICAL & SURGICAL HISTORY:  Diabetes mellitus  type 2      Foot ulcer due to secondary DM      Coronary artery disease      Acute on chronic systolic heart failure      H/O kidney transplant      Breast Reduction  at age 18      Toe amputation status, left      S/P CABG (coronary artery bypass graft)      AICD (automatic cardioverter/defibrillator) present          Vital Signs Last 24 Hrs  T(C): 36.5 (16 Dec 2023 05:25), Max: 37.1 (16 Dec 2023 00:15)  T(F): 97.7 (16 Dec 2023 05:25), Max: 98.8 (16 Dec 2023 00:15)  HR: 76 (16 Dec 2023 05:25) (66 - 83)  BP: 154/70 (16 Dec 2023 05:25) (112/71 - 154/70)  BP(mean): --  RR: 18 (16 Dec 2023 05:25) (18 - 18)  SpO2: 97% (16 Dec 2023 05:25) (95% - 100%)    Parameters below as of 16 Dec 2023 05:25  Patient On (Oxygen Delivery Method): room air        I&O's Summary    15 Dec 2023 07:01  -  16 Dec 2023 07:00  --------------------------------------------------------  IN: 480 mL / OUT: 1225 mL / NET: -745 mL                              10.6   3.06  )-----------( 191      ( 16 Dec 2023 06:40 )             32.8     12-16    134<L>  |  102  |  33<H>  ----------------------------<  133<H>  4.5   |  22  |  1.32<H>    Ca    10.6<H>      16 Dec 2023 06:41  Phos  2.1     12-16  Mg     1.9     12-16    TPro  5.8<L>  /  Alb  3.4  /  TBili  0.3  /  DBili  x   /  AST  9<L>  /  ALT  <5<L>  /  AlkPhos  99  12-16    Tacrolimus (), Serum: 4.9 ng/mL (12-15 @ 07:11)        Culture - Tissue with Gram Stain (collected 12-11-23 @ 21:16)  Source: .Tissue Other  Gram Stain (12-12-23 @ 01:58):    Numerous polymorphonuclear leukocytes seen per low power field    No organisms seen per oil power field  Preliminary Report (12-12-23 @ 20:46):    No growth    Culture - Tissue with Gram Stain (collected 12-11-23 @ 21:16)  Source: .Tissue Other  Gram Stain (12-12-23 @ 22:53):    No polymorphonuclear cells seen per low power field    Rare Gram Variable Coccobacilli seen per oil power field  Preliminary Report (12-13-23 @ 21:35):    Rare Klebsiella pneumoniae ESBL    Few Staphylococcus epidermidis  Organism: Klebsiella pneumoniae ESBL  Staphylococcus epidermidis (12-13-23 @ 21:32)  Organism: Staphylococcus epidermidis (12-13-23 @ 21:32)  Organism: Klebsiella pneumoniae ESBL (12-13-23 @ 21:26)        Review of systems  All other systems were reviewed and are negative, except as noted.     PHYSICAL EXAM:  Constitutional: Well developed  Eyes: Anicteric, PERRLA  ENMT: nc/at  Neck: supple  Respiratory: CTA B/L  Cardiovascular: RRR  Gastrointestinal: Soft, ND/NT   Genitourinary: Voiding spontaneously  Extremities: SCD's in place and working bilaterally  Vascular: Palpable dp pulses bilaterally, b/l LE foot wound dressing in place  Neurological: A&O x3  Skin: no rashes, ulcerations or lesions other than above   Musculoskeletal: Moving all extremities  Psychiatric: Responsive Transplant Surgery - Multidisciplinary Progress Note  --------------------------------------------------------------  DDRT 7/16/2023    Present: Patient seen and examined with multidisciplinary team including Transplant Surgeon:  Dr. Muñoz. transplant fellow Dr. Kitchen, Transplant Nephrologist: Dr. Aguilar, [facility]  Transplant Pharmacist: Edgardo  and unit RN during am rounds.  Disciplines not in attendance will be notified of the plan.     53M with past medical history significant for HTN, CHF (s/p AICD 2016--last interrogated 7/15/23), CAD (s/p CABG 2015), IDDM, PVD s/p stent x4 in RLE with R big toe/second toe amputation (2021) and ESRD on HD via LUE AVF for 6 years now s/p DDRT 7/16/2023 with Thymoglobulin induction. Post-transplant course complicated by severe constipation requiring disimpaction, DGF requiring hemodialysis and LUE swelling with concern for L subclavian thrombus on Eliquis, now s/p IR fistulogram 7/7/23 with balloon angioplasty of subclavian vein. He was discharged home on 7/27/23     Presented to [facility] ED from wound care clinic on 12/6/23 after concern for osteomyelitis from clinic providers.     Interval Events:  - Afebrile, VSS  - Good urine output  -  BID      Immunosuppression:  Maintenance Immuno: Envarsus by level, MMF held, Pred 10mg    Potential Discharge date: TBD  Plan of care: see below          MEDICATIONS  (STANDING):  apixaban 5 milliGRAM(s) Oral every 12 hours  aspirin enteric coated 81 milliGRAM(s) Oral daily  atorvastatin 40 milliGRAM(s) Oral at bedtime  carvedilol 3.125 milliGRAM(s) Oral every 12 hours  famotidine    Tablet 20 milliGRAM(s) Oral daily  imipenem/cilastatin  IVPB 500 milliGRAM(s) IV Intermittent every 6 hours  imipenem/cilastatin  IVPB      insulin glargine Injectable (LANTUS) 10 Unit(s) SubCutaneous at bedtime  insulin lispro (ADMELOG) corrective regimen sliding scale   SubCutaneous three times a day before meals  insulin lispro Injectable (ADMELOG) 10 Unit(s) SubCutaneous three times a day with meals  mycophenolate mofetil 500 milliGRAM(s) Oral <User Schedule>  predniSONE   Tablet 10 milliGRAM(s) Oral daily  tacrolimus ER Tablet (ENVARSUS XR) 2 milliGRAM(s) Oral <User Schedule>  trimethoprim   80 mG/sulfamethoxazole 400 mG 1 Tablet(s) Oral daily    MEDICATIONS  (PRN):  acetaminophen     Tablet .. 650 milliGRAM(s) Oral every 6 hours PRN Mild Pain (1 - 3)  oxycodone    5 mG/acetaminophen 325 mG 1 Tablet(s) Oral every 4 hours PRN Moderate Pain (4 - 6)      PAST MEDICAL & SURGICAL HISTORY:  Diabetes mellitus  type 2      Foot ulcer due to secondary DM      Coronary artery disease      Acute on chronic systolic heart failure      H/O kidney transplant      Breast Reduction  at age 18      Toe amputation status, left      S/P CABG (coronary artery bypass graft)      AICD (automatic cardioverter/defibrillator) present          Vital Signs Last 24 Hrs  T(C): 36.5 (16 Dec 2023 05:25), Max: 37.1 (16 Dec 2023 00:15)  T(F): 97.7 (16 Dec 2023 05:25), Max: 98.8 (16 Dec 2023 00:15)  HR: 76 (16 Dec 2023 05:25) (66 - 83)  BP: 154/70 (16 Dec 2023 05:25) (112/71 - 154/70)  BP(mean): --  RR: 18 (16 Dec 2023 05:25) (18 - 18)  SpO2: 97% (16 Dec 2023 05:25) (95% - 100%)    Parameters below as of 16 Dec 2023 05:25  Patient On (Oxygen Delivery Method): room air        I&O's Summary    15 Dec 2023 07:01  -  16 Dec 2023 07:00  --------------------------------------------------------  IN: 480 mL / OUT: 1225 mL / NET: -745 mL                              10.6   3.06  )-----------( 191      ( 16 Dec 2023 06:40 )             32.8     12-16    134<L>  |  102  |  33<H>  ----------------------------<  133<H>  4.5   |  22  |  1.32<H>    Ca    10.6<H>      16 Dec 2023 06:41  Phos  2.1     12-16  Mg     1.9     12-16    TPro  5.8<L>  /  Alb  3.4  /  TBili  0.3  /  DBili  x   /  AST  9<L>  /  ALT  <5<L>  /  AlkPhos  99  12-16    Tacrolimus (), Serum: 4.9 ng/mL (12-15 @ 07:11)        Culture - Tissue with Gram Stain (collected 12-11-23 @ 21:16)  Source: .Tissue Other  Gram Stain (12-12-23 @ 01:58):    Numerous polymorphonuclear leukocytes seen per low power field    No organisms seen per oil power field  Preliminary Report (12-12-23 @ 20:46):    No growth    Culture - Tissue with Gram Stain (collected 12-11-23 @ 21:16)  Source: .Tissue Other  Gram Stain (12-12-23 @ 22:53):    No polymorphonuclear cells seen per low power field    Rare Gram Variable Coccobacilli seen per oil power field  Preliminary Report (12-13-23 @ 21:35):    Rare Klebsiella pneumoniae ESBL    Few Staphylococcus epidermidis  Organism: Klebsiella pneumoniae ESBL  Staphylococcus epidermidis (12-13-23 @ 21:32)  Organism: Staphylococcus epidermidis (12-13-23 @ 21:32)  Organism: Klebsiella pneumoniae ESBL (12-13-23 @ 21:26)        Review of systems  All other systems were reviewed and are negative, except as noted.     PHYSICAL EXAM:  Constitutional: Well developed  Eyes: Anicteric, PERRLA  ENMT: nc/at  Neck: supple  Respiratory: CTA B/L  Cardiovascular: RRR  Gastrointestinal: Soft, ND/NT   Genitourinary: Voiding spontaneously  Extremities: SCD's in place and working bilaterally  Vascular: Palpable dp pulses bilaterally, b/l LE foot wound dressing in place  Neurological: A&O x3  Skin: no rashes, ulcerations or lesions other than above   Musculoskeletal: Moving all extremities  Psychiatric: Responsive

## 2023-12-16 NOTE — PROGRESS NOTE ADULT - REASON FOR ADMISSION
c/f osteomyelitis

## 2023-12-16 NOTE — PROGRESS NOTE ADULT - NS ATTEND OPT1 GEN_ALL_CORE
I independently performed the documented:
I attest my time as attending is greater than 50% of the total combined time spent on qualifying patient care activities by the PA/NP and attending.
I independently performed the documented:

## 2023-12-16 NOTE — PROGRESS NOTE ADULT - NS ATTEND AMEND GEN_ALL_CORE FT
I personally saw and examined patient.  Kidney transplant recipient.  Alert, oriented, responsive.  Abdomen soft and non-tender.  Wound clean and dry.    IMMUNOSUPPRESSION MANAGEMENT:  Envarsus level today: 4.9  Envarsus level yesterday:   Aim for level of 4-6  Current envarsus dose: 2 mg by mouth daily.  Envarsus dose management:   No change in current dose
s/p DDRT 7/2023 admitted with osteomyelitis  POD#1 s/p right 3rd toe amputation and heel debridement, placement of skin graft  +GIN, slight rise in Cr. likely due to meds and elevated tacro level. Cont to monitor    Immunosuppression - Restart envarsus 1mg daily; Cont pred 10mg daily; MMF held due to infection/osteo  Prophylaxis with bactrim, pepcid.   Valcyte held due to neutropenia  Cont abx as per ID, with tentative plan for at least 6 weeks treatment. plan for PICC line
Kidney transplant recipient.  Being treated for osteomyelitis.    IMMUNOSUPPRESSION MANAGEMENT:  Envarsus level today: 6.9  Envarsus level yesterday: 4.9  Aim for level of: 8-10  Current envarsus dose: 2 mg by mouth daily  Envarsus dose management:  No change in current dose.
s/p DDRT 7/2023 admitted with osteomyelitis  POD#2 s/p right 3rd toe amputation and heel debridement, placement of skin graft  +GIN improving  Hold eliquis, transition to heparin gtt for planned angiogram, pending further improvement in Cr    Immunosuppression - envarsus 1mg daily; pred 10mg daily; MMF held due to infection/osteo  Prophylaxis with bactrim, pepcid.   Valcyte held due to neutropenia  Cont abx as per ID, with tentative plan for at least 6 weeks treatment. plan for PICC line
s/p DDRT 7/2023 admitted with osteomyelitis  POD#3 s/p right 3rd toe amputation and heel debridement, placement of skin graft  +GIN improving, Cr down to 1.1  f/u vascular re: possible angiogram. Per reports, there was good tissue bleeding, so no need for angiogram at this time. Will monitor wound for healing    Immunosuppression - envarsus 2mg daily; pred 10mg daily; MMF held due to infection/osteo  Prophylaxis with bactrim, pepcid.   Valcyte held due to neutropenia  Cont abx as per ID, with tentative plan for at least 6 weeks treatment. plan for PICC line
s/p DDRT 7/2023 admitted with osteomyelitis  Plan for OR today for toe amputation and heel debridement  will f/u with vascular re: angiogram b/l LE to assess if needs vascular reconstruction    Immunosuppression - Hold envarsus due to high level; Cont pred 10mg daily; MMF held due to infection/osteo  Prophylaxis with bactrim, pepcid.   Valcyte held due to neutropenia

## 2023-12-16 NOTE — PROGRESS NOTE ADULT - NS_MD_PANP_GEN_ALL_CORE
Attending and PA/NP shared services statement (NON-critical care):
Rafi Heaton(PA)
Attending and PA/NP shared services statement (NON-critical care):
Attending and PA/NP shared services statement (NON-critical care):

## 2023-12-16 NOTE — PROGRESS NOTE ADULT - ASSESSMENT
54 y/o M with past medical history significant for IDDM, CAD s/p CABG x4v (2015) s/p AICD (2016), PVD (4 stents in RLE), HTN, HLD and ESRD previously on HD via LUE AVF (ligated 9/8/23) now s/p DDRT 7/16/2023 with Simulect -> Thymoglobulin induction. His post-transplant course was complicated by DGF and subtly AMR with +DSAs s/p PLEX/IVIG (8/2023) and L subclavian thrombus remains on Eliquis presents to [facility] ED from wound care clinic on 12/6/23 after concern for osteomyelitis from clinic providers.     [] Osteomyelitis of R foot third digit and left foot 1st toe  - Left foot wound culture growing ESBL Klebsiella, E. Faecalis, M. Morganii, Profidencia Stauartii   - OR w/podiatry 12/11: R partial 3rd toe amp, R foot I&D of necrotic tissue, L foot application of kerecis graft.   - Cx: klebsiella/staph epidermidis  - Vascular team: heparin drip dc'ed, resumed on eliquis  - ID following: Continue Imipenem (12/10) x 4 weeks  - Blood cultures with NGTD   - PICC line 12/14 for home IV abx, will continue Imipenem at home per TID  - Ready for discharge home with abx    [ ] s/p DDRT 7/16/2023   - Baseline Cr ~1.0   - Immuno: Env by level, MMF 500mg BID, Pred 10   - PPx: Bactrim, Pepcid   - Valcyte held for leukopenia (CMV negative 12/7)     [ ] IDDM  - Continue Lantus/Lispro home dose     [ ] CHF, CAD s/p CABG, L SC DVT   - Continue Eliquis, ASA 81   - Continue Coreg, Lipitor          52 y/o M with past medical history significant for IDDM, CAD s/p CABG x4v (2015) s/p AICD (2016), PVD (4 stents in RLE), HTN, HLD and ESRD previously on HD via LUE AVF (ligated 9/8/23) now s/p DDRT 7/16/2023 with Simulect -> Thymoglobulin induction. His post-transplant course was complicated by DGF and subtly AMR with +DSAs s/p PLEX/IVIG (8/2023) and L subclavian thrombus remains on Eliquis presents to [facility] ED from wound care clinic on 12/6/23 after concern for osteomyelitis from clinic providers.     [] Osteomyelitis of R foot third digit and left foot 1st toe  - Left foot wound culture growing ESBL Klebsiella, E. Faecalis, M. Morganii, Profidencia Stauartii   - OR w/podiatry 12/11: R partial 3rd toe amp, R foot I&D of necrotic tissue, L foot application of kerecis graft.   - Cx: klebsiella/staph epidermidis  - Vascular team: heparin drip dc'ed, resumed on eliquis  - ID following: Continue Imipenem (12/10) x 4 weeks  - Blood cultures with NGTD   - PICC line 12/14 for home IV abx, will continue Imipenem at home per TID  - Ready for discharge home with abx    [ ] s/p DDRT 7/16/2023   - Baseline Cr ~1.0   - Immuno: Env by level, MMF 500mg BID, Pred 10   - PPx: Bactrim, Pepcid   - Valcyte held for leukopenia (CMV negative 12/7)     [ ] IDDM  - Continue Lantus/Lispro home dose     [ ] CHF, CAD s/p CABG, L SC DVT   - Continue Eliquis, ASA 81   - Continue Coreg, Lipitor

## 2023-12-16 NOTE — PROGRESS NOTE ADULT - PROVIDER SPECIALTY LIST ADULT
Cardiology
Podiatry
Transplant ID
Transplant Nephrology
Transplant Surgery
Vascular Surgery
Cardiology
Podiatry
Cardiology
Podiatry
Transplant Nephrology
Transplant Surgery
Transplant Surgery
Vascular Surgery
Podiatry
Podiatry
Transplant Nephrology
Transplant Surgery
Transplant Surgery
Vascular Surgery
Vascular Surgery
Cardiology
Podiatry
Transplant ID
Transplant Surgery
Transplant Surgery
Vascular Surgery
Transplant ID
Vascular Surgery
Vascular Surgery

## 2023-12-19 LAB
SURGICAL PATHOLOGY STUDY: SIGNIFICANT CHANGE UP
SURGICAL PATHOLOGY STUDY: SIGNIFICANT CHANGE UP

## 2023-12-29 ENCOUNTER — EMERGENCY (EMERGENCY)
Facility: HOSPITAL | Age: 53
LOS: 1 days | Discharge: HOME | End: 2023-12-29
Attending: EMERGENCY MEDICINE
Payer: MEDICARE

## 2023-12-29 ENCOUNTER — APPOINTMENT (OUTPATIENT)
Dept: WOUND CARE | Facility: HOSPITAL | Age: 53
End: 2023-12-29

## 2023-12-29 VITALS
TEMPERATURE: 98 F | OXYGEN SATURATION: 100 % | SYSTOLIC BLOOD PRESSURE: 168 MMHG | HEART RATE: 74 BPM | RESPIRATION RATE: 18 BRPM | DIASTOLIC BLOOD PRESSURE: 71 MMHG

## 2023-12-29 VITALS
HEIGHT: 69 IN | HEART RATE: 78 BPM | SYSTOLIC BLOOD PRESSURE: 195 MMHG | RESPIRATION RATE: 20 BRPM | OXYGEN SATURATION: 100 % | TEMPERATURE: 98 F | WEIGHT: 195.11 LBS | DIASTOLIC BLOOD PRESSURE: 84 MMHG

## 2023-12-29 DIAGNOSIS — Z95.810 PRESENCE OF AUTOMATIC (IMPLANTABLE) CARDIAC DEFIBRILLATOR: Chronic | ICD-10-CM

## 2023-12-29 DIAGNOSIS — Z89.422 ACQUIRED ABSENCE OF OTHER LEFT TOE(S): Chronic | ICD-10-CM

## 2023-12-29 DIAGNOSIS — Z95.1 PRESENCE OF AORTOCORONARY BYPASS GRAFT: Chronic | ICD-10-CM

## 2023-12-29 LAB
ALBUMIN SERPL ELPH-MCNC: 3.8 G/DL — SIGNIFICANT CHANGE UP (ref 3.3–5)
ALBUMIN SERPL ELPH-MCNC: 3.8 G/DL — SIGNIFICANT CHANGE UP (ref 3.3–5)
ALP SERPL-CCNC: 117 U/L — SIGNIFICANT CHANGE UP (ref 40–120)
ALP SERPL-CCNC: 117 U/L — SIGNIFICANT CHANGE UP (ref 40–120)
ALT FLD-CCNC: <5 U/L — LOW (ref 10–45)
ALT FLD-CCNC: <5 U/L — LOW (ref 10–45)
ANION GAP SERPL CALC-SCNC: 8 MMOL/L — SIGNIFICANT CHANGE UP (ref 5–17)
ANION GAP SERPL CALC-SCNC: 8 MMOL/L — SIGNIFICANT CHANGE UP (ref 5–17)
APPEARANCE UR: CLEAR — SIGNIFICANT CHANGE UP
APPEARANCE UR: CLEAR — SIGNIFICANT CHANGE UP
AST SERPL-CCNC: 11 U/L — SIGNIFICANT CHANGE UP (ref 10–40)
AST SERPL-CCNC: 11 U/L — SIGNIFICANT CHANGE UP (ref 10–40)
BACTERIA # UR AUTO: NEGATIVE /HPF — SIGNIFICANT CHANGE UP
BACTERIA # UR AUTO: NEGATIVE /HPF — SIGNIFICANT CHANGE UP
BASE EXCESS BLDV CALC-SCNC: -1.7 MMOL/L — SIGNIFICANT CHANGE UP (ref -2–3)
BASE EXCESS BLDV CALC-SCNC: -1.7 MMOL/L — SIGNIFICANT CHANGE UP (ref -2–3)
BASOPHILS # BLD AUTO: 0.03 K/UL — SIGNIFICANT CHANGE UP (ref 0–0.2)
BASOPHILS # BLD AUTO: 0.03 K/UL — SIGNIFICANT CHANGE UP (ref 0–0.2)
BASOPHILS NFR BLD AUTO: 0.8 % — SIGNIFICANT CHANGE UP (ref 0–2)
BASOPHILS NFR BLD AUTO: 0.8 % — SIGNIFICANT CHANGE UP (ref 0–2)
BILIRUB SERPL-MCNC: 0.4 MG/DL — SIGNIFICANT CHANGE UP (ref 0.2–1.2)
BILIRUB SERPL-MCNC: 0.4 MG/DL — SIGNIFICANT CHANGE UP (ref 0.2–1.2)
BILIRUB UR-MCNC: NEGATIVE — SIGNIFICANT CHANGE UP
BILIRUB UR-MCNC: NEGATIVE — SIGNIFICANT CHANGE UP
BUN SERPL-MCNC: 28 MG/DL — HIGH (ref 7–23)
BUN SERPL-MCNC: 28 MG/DL — HIGH (ref 7–23)
CA-I SERPL-SCNC: 1.44 MMOL/L — HIGH (ref 1.15–1.33)
CA-I SERPL-SCNC: 1.44 MMOL/L — HIGH (ref 1.15–1.33)
CALCIUM SERPL-MCNC: 10.4 MG/DL — SIGNIFICANT CHANGE UP (ref 8.4–10.5)
CALCIUM SERPL-MCNC: 10.4 MG/DL — SIGNIFICANT CHANGE UP (ref 8.4–10.5)
CAST: 0 /LPF — SIGNIFICANT CHANGE UP (ref 0–4)
CAST: 0 /LPF — SIGNIFICANT CHANGE UP (ref 0–4)
CHLORIDE BLDV-SCNC: 107 MMOL/L — SIGNIFICANT CHANGE UP (ref 96–108)
CHLORIDE BLDV-SCNC: 107 MMOL/L — SIGNIFICANT CHANGE UP (ref 96–108)
CHLORIDE SERPL-SCNC: 108 MMOL/L — SIGNIFICANT CHANGE UP (ref 96–108)
CHLORIDE SERPL-SCNC: 108 MMOL/L — SIGNIFICANT CHANGE UP (ref 96–108)
CO2 BLDV-SCNC: 26 MMOL/L — SIGNIFICANT CHANGE UP (ref 22–26)
CO2 BLDV-SCNC: 26 MMOL/L — SIGNIFICANT CHANGE UP (ref 22–26)
CO2 SERPL-SCNC: 24 MMOL/L — SIGNIFICANT CHANGE UP (ref 22–31)
CO2 SERPL-SCNC: 24 MMOL/L — SIGNIFICANT CHANGE UP (ref 22–31)
COLOR SPEC: YELLOW — SIGNIFICANT CHANGE UP
COLOR SPEC: YELLOW — SIGNIFICANT CHANGE UP
CREAT SERPL-MCNC: 1.08 MG/DL — SIGNIFICANT CHANGE UP (ref 0.5–1.3)
CREAT SERPL-MCNC: 1.08 MG/DL — SIGNIFICANT CHANGE UP (ref 0.5–1.3)
DACRYOCYTES BLD QL SMEAR: SLIGHT — SIGNIFICANT CHANGE UP
DACRYOCYTES BLD QL SMEAR: SLIGHT — SIGNIFICANT CHANGE UP
DIFF PNL FLD: NEGATIVE — SIGNIFICANT CHANGE UP
DIFF PNL FLD: NEGATIVE — SIGNIFICANT CHANGE UP
EGFR: 83 ML/MIN/1.73M2 — SIGNIFICANT CHANGE UP
EGFR: 83 ML/MIN/1.73M2 — SIGNIFICANT CHANGE UP
ELLIPTOCYTES BLD QL SMEAR: SIGNIFICANT CHANGE UP
ELLIPTOCYTES BLD QL SMEAR: SIGNIFICANT CHANGE UP
EOSINOPHIL # BLD AUTO: 0.38 K/UL — SIGNIFICANT CHANGE UP (ref 0–0.5)
EOSINOPHIL # BLD AUTO: 0.38 K/UL — SIGNIFICANT CHANGE UP (ref 0–0.5)
EOSINOPHIL NFR BLD AUTO: 10.2 % — HIGH (ref 0–6)
EOSINOPHIL NFR BLD AUTO: 10.2 % — HIGH (ref 0–6)
GAS PNL BLDV: 137 MMOL/L — SIGNIFICANT CHANGE UP (ref 136–145)
GAS PNL BLDV: 137 MMOL/L — SIGNIFICANT CHANGE UP (ref 136–145)
GAS PNL BLDV: SIGNIFICANT CHANGE UP
GAS PNL BLDV: SIGNIFICANT CHANGE UP
GIANT PLATELETS BLD QL SMEAR: PRESENT — SIGNIFICANT CHANGE UP
GIANT PLATELETS BLD QL SMEAR: PRESENT — SIGNIFICANT CHANGE UP
GLUCOSE BLDV-MCNC: 114 MG/DL — HIGH (ref 70–99)
GLUCOSE BLDV-MCNC: 114 MG/DL — HIGH (ref 70–99)
GLUCOSE SERPL-MCNC: 117 MG/DL — HIGH (ref 70–99)
GLUCOSE SERPL-MCNC: 117 MG/DL — HIGH (ref 70–99)
GLUCOSE UR QL: NEGATIVE MG/DL — SIGNIFICANT CHANGE UP
GLUCOSE UR QL: NEGATIVE MG/DL — SIGNIFICANT CHANGE UP
HCO3 BLDV-SCNC: 25 MMOL/L — SIGNIFICANT CHANGE UP (ref 22–29)
HCO3 BLDV-SCNC: 25 MMOL/L — SIGNIFICANT CHANGE UP (ref 22–29)
HCT VFR BLD CALC: 33.8 % — LOW (ref 39–50)
HCT VFR BLD CALC: 33.8 % — LOW (ref 39–50)
HCT VFR BLDA CALC: 33 % — LOW (ref 39–51)
HCT VFR BLDA CALC: 33 % — LOW (ref 39–51)
HGB BLD CALC-MCNC: 10.9 G/DL — LOW (ref 12.6–17.4)
HGB BLD CALC-MCNC: 10.9 G/DL — LOW (ref 12.6–17.4)
HGB BLD-MCNC: 10.6 G/DL — LOW (ref 13–17)
HGB BLD-MCNC: 10.6 G/DL — LOW (ref 13–17)
KETONES UR-MCNC: 15 MG/DL
KETONES UR-MCNC: 15 MG/DL
LACTATE BLDV-MCNC: 0.9 MMOL/L — SIGNIFICANT CHANGE UP (ref 0.5–2)
LACTATE BLDV-MCNC: 0.9 MMOL/L — SIGNIFICANT CHANGE UP (ref 0.5–2)
LEUKOCYTE ESTERASE UR-ACNC: NEGATIVE — SIGNIFICANT CHANGE UP
LEUKOCYTE ESTERASE UR-ACNC: NEGATIVE — SIGNIFICANT CHANGE UP
LIDOCAIN IGE QN: 22 U/L — SIGNIFICANT CHANGE UP (ref 7–60)
LIDOCAIN IGE QN: 22 U/L — SIGNIFICANT CHANGE UP (ref 7–60)
LYMPHOCYTES # BLD AUTO: 0.32 K/UL — LOW (ref 1–3.3)
LYMPHOCYTES # BLD AUTO: 0.32 K/UL — LOW (ref 1–3.3)
LYMPHOCYTES # BLD AUTO: 8.5 % — LOW (ref 13–44)
LYMPHOCYTES # BLD AUTO: 8.5 % — LOW (ref 13–44)
MAGNESIUM SERPL-MCNC: 1.5 MG/DL — LOW (ref 1.6–2.6)
MAGNESIUM SERPL-MCNC: 1.5 MG/DL — LOW (ref 1.6–2.6)
MANUAL SMEAR VERIFICATION: SIGNIFICANT CHANGE UP
MANUAL SMEAR VERIFICATION: SIGNIFICANT CHANGE UP
MCHC RBC-ENTMCNC: 27.4 PG — SIGNIFICANT CHANGE UP (ref 27–34)
MCHC RBC-ENTMCNC: 27.4 PG — SIGNIFICANT CHANGE UP (ref 27–34)
MCHC RBC-ENTMCNC: 31.4 GM/DL — LOW (ref 32–36)
MCHC RBC-ENTMCNC: 31.4 GM/DL — LOW (ref 32–36)
MCV RBC AUTO: 87.3 FL — SIGNIFICANT CHANGE UP (ref 80–100)
MCV RBC AUTO: 87.3 FL — SIGNIFICANT CHANGE UP (ref 80–100)
MONOCYTES # BLD AUTO: 0.35 K/UL — SIGNIFICANT CHANGE UP (ref 0–0.9)
MONOCYTES # BLD AUTO: 0.35 K/UL — SIGNIFICANT CHANGE UP (ref 0–0.9)
MONOCYTES NFR BLD AUTO: 9.3 % — SIGNIFICANT CHANGE UP (ref 2–14)
MONOCYTES NFR BLD AUTO: 9.3 % — SIGNIFICANT CHANGE UP (ref 2–14)
NEUTROPHILS # BLD AUTO: 2.64 K/UL — SIGNIFICANT CHANGE UP (ref 1.8–7.4)
NEUTROPHILS # BLD AUTO: 2.64 K/UL — SIGNIFICANT CHANGE UP (ref 1.8–7.4)
NEUTROPHILS NFR BLD AUTO: 71.2 % — SIGNIFICANT CHANGE UP (ref 43–77)
NEUTROPHILS NFR BLD AUTO: 71.2 % — SIGNIFICANT CHANGE UP (ref 43–77)
NITRITE UR-MCNC: NEGATIVE — SIGNIFICANT CHANGE UP
NITRITE UR-MCNC: NEGATIVE — SIGNIFICANT CHANGE UP
OVALOCYTES BLD QL SMEAR: SLIGHT — SIGNIFICANT CHANGE UP
OVALOCYTES BLD QL SMEAR: SLIGHT — SIGNIFICANT CHANGE UP
PCO2 BLDV: 48 MMHG — SIGNIFICANT CHANGE UP (ref 42–55)
PCO2 BLDV: 48 MMHG — SIGNIFICANT CHANGE UP (ref 42–55)
PH BLDV: 7.32 — SIGNIFICANT CHANGE UP (ref 7.32–7.43)
PH BLDV: 7.32 — SIGNIFICANT CHANGE UP (ref 7.32–7.43)
PH UR: 6.5 — SIGNIFICANT CHANGE UP (ref 5–8)
PH UR: 6.5 — SIGNIFICANT CHANGE UP (ref 5–8)
PLAT MORPH BLD: NORMAL — SIGNIFICANT CHANGE UP
PLAT MORPH BLD: NORMAL — SIGNIFICANT CHANGE UP
PLATELET # BLD AUTO: 157 K/UL — SIGNIFICANT CHANGE UP (ref 150–400)
PLATELET # BLD AUTO: 157 K/UL — SIGNIFICANT CHANGE UP (ref 150–400)
PO2 BLDV: 19 MMHG — LOW (ref 25–45)
PO2 BLDV: 19 MMHG — LOW (ref 25–45)
POIKILOCYTOSIS BLD QL AUTO: SIGNIFICANT CHANGE UP
POIKILOCYTOSIS BLD QL AUTO: SIGNIFICANT CHANGE UP
POTASSIUM BLDV-SCNC: 4.6 MMOL/L — SIGNIFICANT CHANGE UP (ref 3.5–5.1)
POTASSIUM BLDV-SCNC: 4.6 MMOL/L — SIGNIFICANT CHANGE UP (ref 3.5–5.1)
POTASSIUM SERPL-MCNC: 4.7 MMOL/L — SIGNIFICANT CHANGE UP (ref 3.5–5.3)
POTASSIUM SERPL-MCNC: 4.7 MMOL/L — SIGNIFICANT CHANGE UP (ref 3.5–5.3)
POTASSIUM SERPL-SCNC: 4.7 MMOL/L — SIGNIFICANT CHANGE UP (ref 3.5–5.3)
POTASSIUM SERPL-SCNC: 4.7 MMOL/L — SIGNIFICANT CHANGE UP (ref 3.5–5.3)
PROT SERPL-MCNC: 6 G/DL — SIGNIFICANT CHANGE UP (ref 6–8.3)
PROT SERPL-MCNC: 6 G/DL — SIGNIFICANT CHANGE UP (ref 6–8.3)
PROT UR-MCNC: 30 MG/DL
PROT UR-MCNC: 30 MG/DL
RBC # BLD: 3.87 M/UL — LOW (ref 4.2–5.8)
RBC # BLD: 3.87 M/UL — LOW (ref 4.2–5.8)
RBC # FLD: 13.7 % — SIGNIFICANT CHANGE UP (ref 10.3–14.5)
RBC # FLD: 13.7 % — SIGNIFICANT CHANGE UP (ref 10.3–14.5)
RBC BLD AUTO: ABNORMAL
RBC BLD AUTO: ABNORMAL
RBC CASTS # UR COMP ASSIST: 0 /HPF — SIGNIFICANT CHANGE UP (ref 0–4)
RBC CASTS # UR COMP ASSIST: 0 /HPF — SIGNIFICANT CHANGE UP (ref 0–4)
SAO2 % BLDV: 23.4 % — LOW (ref 67–88)
SAO2 % BLDV: 23.4 % — LOW (ref 67–88)
SODIUM SERPL-SCNC: 140 MMOL/L — SIGNIFICANT CHANGE UP (ref 135–145)
SODIUM SERPL-SCNC: 140 MMOL/L — SIGNIFICANT CHANGE UP (ref 135–145)
SP GR SPEC: 1.02 — SIGNIFICANT CHANGE UP (ref 1–1.03)
SP GR SPEC: 1.02 — SIGNIFICANT CHANGE UP (ref 1–1.03)
SQUAMOUS # UR AUTO: 0 /HPF — SIGNIFICANT CHANGE UP (ref 0–5)
SQUAMOUS # UR AUTO: 0 /HPF — SIGNIFICANT CHANGE UP (ref 0–5)
TACROLIMUS SERPL-MCNC: 11 NG/ML — SIGNIFICANT CHANGE UP
TACROLIMUS SERPL-MCNC: 11 NG/ML — SIGNIFICANT CHANGE UP
UROBILINOGEN FLD QL: 1 MG/DL — SIGNIFICANT CHANGE UP (ref 0.2–1)
UROBILINOGEN FLD QL: 1 MG/DL — SIGNIFICANT CHANGE UP (ref 0.2–1)
WBC # BLD: 3.71 K/UL — LOW (ref 3.8–10.5)
WBC # BLD: 3.71 K/UL — LOW (ref 3.8–10.5)
WBC # FLD AUTO: 3.71 K/UL — LOW (ref 3.8–10.5)
WBC # FLD AUTO: 3.71 K/UL — LOW (ref 3.8–10.5)
WBC UR QL: 1 /HPF — SIGNIFICANT CHANGE UP (ref 0–5)
WBC UR QL: 1 /HPF — SIGNIFICANT CHANGE UP (ref 0–5)

## 2023-12-29 PROCEDURE — 76776 US EXAM K TRANSPL W/DOPPLER: CPT

## 2023-12-29 PROCEDURE — 82803 BLOOD GASES ANY COMBINATION: CPT

## 2023-12-29 PROCEDURE — 87086 URINE CULTURE/COLONY COUNT: CPT

## 2023-12-29 PROCEDURE — 82947 ASSAY GLUCOSE BLOOD QUANT: CPT

## 2023-12-29 PROCEDURE — 83605 ASSAY OF LACTIC ACID: CPT

## 2023-12-29 PROCEDURE — 84295 ASSAY OF SERUM SODIUM: CPT

## 2023-12-29 PROCEDURE — 85025 COMPLETE CBC W/AUTO DIFF WBC: CPT

## 2023-12-29 PROCEDURE — 80180 DRUG SCRN QUAN MYCOPHENOLATE: CPT

## 2023-12-29 PROCEDURE — 96374 THER/PROPH/DIAG INJ IV PUSH: CPT

## 2023-12-29 PROCEDURE — 76776 US EXAM K TRANSPL W/DOPPLER: CPT | Mod: 26,LT

## 2023-12-29 PROCEDURE — 71045 X-RAY EXAM CHEST 1 VIEW: CPT

## 2023-12-29 PROCEDURE — 82330 ASSAY OF CALCIUM: CPT

## 2023-12-29 PROCEDURE — 83690 ASSAY OF LIPASE: CPT

## 2023-12-29 PROCEDURE — 99284 EMERGENCY DEPT VISIT MOD MDM: CPT | Mod: 25

## 2023-12-29 PROCEDURE — 36415 COLL VENOUS BLD VENIPUNCTURE: CPT

## 2023-12-29 PROCEDURE — 83735 ASSAY OF MAGNESIUM: CPT

## 2023-12-29 PROCEDURE — 80053 COMPREHEN METABOLIC PANEL: CPT

## 2023-12-29 PROCEDURE — 85014 HEMATOCRIT: CPT

## 2023-12-29 PROCEDURE — 81001 URINALYSIS AUTO W/SCOPE: CPT

## 2023-12-29 PROCEDURE — 74176 CT ABD & PELVIS W/O CONTRAST: CPT | Mod: 26,MA

## 2023-12-29 PROCEDURE — 85018 HEMOGLOBIN: CPT

## 2023-12-29 PROCEDURE — 71045 X-RAY EXAM CHEST 1 VIEW: CPT | Mod: 26

## 2023-12-29 PROCEDURE — 84132 ASSAY OF SERUM POTASSIUM: CPT

## 2023-12-29 PROCEDURE — 74176 CT ABD & PELVIS W/O CONTRAST: CPT | Mod: MA

## 2023-12-29 PROCEDURE — 99284 EMERGENCY DEPT VISIT MOD MDM: CPT

## 2023-12-29 PROCEDURE — 82435 ASSAY OF BLOOD CHLORIDE: CPT

## 2023-12-29 PROCEDURE — 80197 ASSAY OF TACROLIMUS: CPT

## 2023-12-29 RX ORDER — SODIUM CHLORIDE 9 MG/ML
1000 INJECTION, SOLUTION INTRAVENOUS ONCE
Refills: 0 | Status: COMPLETED | OUTPATIENT
Start: 2023-12-29 | End: 2023-12-29

## 2023-12-29 RX ORDER — ALTEPLASE 100 MG
2 KIT INTRAVENOUS ONCE
Refills: 0 | Status: DISCONTINUED | OUTPATIENT
Start: 2023-12-29 | End: 2024-01-01

## 2023-12-29 RX ORDER — PIPERACILLIN AND TAZOBACTAM 4; .5 G/20ML; G/20ML
3.38 INJECTION, POWDER, LYOPHILIZED, FOR SOLUTION INTRAVENOUS ONCE
Refills: 0 | Status: COMPLETED | OUTPATIENT
Start: 2023-12-29 | End: 2023-12-29

## 2023-12-29 RX ORDER — ACETAMINOPHEN 500 MG
650 TABLET ORAL ONCE
Refills: 0 | Status: COMPLETED | OUTPATIENT
Start: 2023-12-29 | End: 2023-12-29

## 2023-12-29 RX ADMIN — SODIUM CHLORIDE 1000 MILLILITER(S): 9 INJECTION, SOLUTION INTRAVENOUS at 03:24

## 2023-12-29 RX ADMIN — PIPERACILLIN AND TAZOBACTAM 200 GRAM(S): 4; .5 INJECTION, POWDER, LYOPHILIZED, FOR SOLUTION INTRAVENOUS at 07:47

## 2023-12-29 RX ADMIN — Medication 650 MILLIGRAM(S): at 01:56

## 2023-12-29 NOTE — ED PROVIDER NOTE - PATIENT PORTAL LINK FT
You can access the FollowMyHealth Patient Portal offered by [facility] by registering at the following website: [email or web address]. By joining [employer]’s FollowMyHealth portal, you will also be able to view your health information using other applications (apps) compatible with our system. You can access the FollowMyHealth Patient Portal offered by [facility] by registering at the following website: [email or web address]. By joining [employer]’s FollowMyHealth portal, you will also be able to view your health information using other applications (apps) compatible with our system.

## 2023-12-29 NOTE — ED ADULT NURSE NOTE - NSFALLHARMRISKINTERV_ED_ALL_ED
Assistance OOB with selected safe patient handling equipment if applicable/Assistance with ambulation/Communicate risk of Fall with Harm to all staff, patient, and family/Monitor gait and stability/Provide patient with walking aids/Provide visual cue: red socks, yellow wristband, yellow gown, etc/Reinforce activity limits and safety measures with patient and family/Bed in lowest position, wheels locked, appropriate side rails in place/Call bell, personal items and telephone in reach/Instruct patient to call for assistance before getting out of bed/chair/stretcher/Non-slip footwear applied when patient is off stretcher/[place] to call system/Physically safe environment - no spills, clutter or unnecessary equipment/Purposeful Proactive Rounding/Room/bathroom lighting operational, light cord in reach Assistance OOB with selected safe patient handling equipment if applicable/Assistance with ambulation/Communicate risk of Fall with Harm to all staff, patient, and family/Monitor gait and stability/Provide patient with walking aids/Provide visual cue: red socks, yellow wristband, yellow gown, etc/Reinforce activity limits and safety measures with patient and family/Bed in lowest position, wheels locked, appropriate side rails in place/Call bell, personal items and telephone in reach/Instruct patient to call for assistance before getting out of bed/chair/stretcher/Non-slip footwear applied when patient is off stretcher/[place] to call system/Physically safe environment - no spills, clutter or unnecessary equipment/Purposeful Proactive Rounding/Room/bathroom lighting operational, light cord in reach

## 2023-12-29 NOTE — CONSULT NOTE ADULT - ASSESSMENT
52 y/o M with past medical history significant for IDDM, CAD s/p CABG x4v (2015) s/p AICD (2016), PVD (4 stents in RLE), HTN, HLD and ESRD previously on HD via LUE AVF (ligated 9/8/23) now s/p DDRT 7/16/2023 with Simulect -> Thymoglobulin induction. His post-transplant course was complicated by DGF and subtly AMR with +DSAs s/p PLEX/IVIG (8/2023) and L subclavian thrombus remains on Eliquis presents to [facility] ED from wound care clinic on 12/6/23 after concern for osteomyelitis from clinic providers.     [ ] c/f bilateral foot osteomyelitis  - Vascular and podiatry consulted  - XR concerning for osteomyelitis of both feet  - Transplant ID consulted  - MRI R/L foot ordered   - Start Zosyn/Vanco for empiric coverage   - Trend fever curve  - Follow up blood cultures sent in ED   - Hx PVD: May need perfusion studies, though has palpable pulses     [ ] s/p DDRT 7/16/2023   - Baseline Cr ~1.0   - Immuno: Env 5, MMF HELD d/t c/f for osteomyelitis , Pred 10   - PPx: Bactrim, Pepcid   - Leukopenia: Daily CBC w/ diff, holding Valcyte, CMV w/ AM labs     [ ] IDDM  - Continue Lantus/Lispro home dose   - A1C w/ AM labs     [ ] CHF, CAD s/p CABG, L SC DVT   - Continue Eliquis, ASA 81   - Continue Coreg, Lipitor  52 y/o M with past medical history significant for IDDM, CAD s/p CABG x4v (2015) s/p AICD (2016), PVD (4 stents in RLE), HTN, HLD and ESRD previously on HD via LUE AVF (ligated 9/8/23) now s/p DDRT 7/16/2023 with Simulect -> Thymoglobulin induction. His post-transplant course was complicated by DGF and subtly AMR with +DSAs s/p PLEX/IVIG (8/2023) and L subclavian thrombus remains on Eliquis presents to [facility] ED from wound care clinic on 12/6/23 after concern for osteomyelitis from clinic providers.     [ ] c/f bilateral foot osteomyelitis  - Vascular and podiatry consulted  - XR concerning for osteomyelitis of both feet  - Transplant ID consulted  - MRI R/L foot ordered   - Start Zosyn/Vanco for empiric coverage   - Trend fever curve  - Follow up blood cultures sent in ED   - Hx PVD: May need perfusion studies, though has palpable pulses     [ ] s/p DDRT 7/16/2023   - Baseline Cr ~1.0   - Immuno: Env 5, MMF HELD d/t c/f for osteomyelitis , Pred 10   - PPx: Bactrim, Pepcid   - Leukopenia: Daily CBC w/ diff, holding Valcyte, CMV w/ AM labs     [ ] IDDM  - Continue Lantus/Lispro home dose   - A1C w/ AM labs     [ ] CHF, CAD s/p CABG, L SC DVT   - Continue Eliquis, ASA 81   - Continue Coreg, Lipitor  52 y/o M with past medical history significant for IDDM, CAD s/p CABG x4v (2015) s/p AICD (2016), PVD (4 stents in RLE), HTN, HLD and ESRD previously on HD via LUE AVF (ligated 9/8/23) now s/p DDRT 7/16/2023 with Simulect -> Thymoglobulin induction. His post-transplant course was complicated by DGF and subtly AMR with +DSAs s/p PLEX/IVIG (8/2023) and L subclavian thrombus remains on Eliquis presents to [facility] ED from wound care clinic on 12/6/23 after concern for osteomyelitis from clinic providers.     [ ] s/p DDRT 7/16/2023   - Cr 1.08  - Immuno: Env 2,  BID , Pred 10   - PPx: Bactrim, Pepcid   - s/p 12/11 Right foot partial 3rd ray resection and left foot wound debridement   - on IV imipenem via PICC  - PICC team c/s to check the PICC line  - FU renal US  - CT: perinephric fat stranding and hydro     54 y/o M with past medical history significant for IDDM, CAD s/p CABG x4v (2015) s/p AICD (2016), PVD (4 stents in RLE), HTN, HLD and ESRD previously on HD via LUE AVF (ligated 9/8/23) now s/p DDRT 7/16/2023 with Simulect -> Thymoglobulin induction. His post-transplant course was complicated by DGF and subtly AMR with +DSAs s/p PLEX/IVIG (8/2023) and L subclavian thrombus remains on Eliquis presents to [facility] ED from wound care clinic on 12/6/23 after concern for osteomyelitis from clinic providers.     [ ] s/p DDRT 7/16/2023   - Cr 1.08  - Immuno: Env 2,  BID , Pred 10   - PPx: Bactrim, Pepcid   - s/p 12/11 Right foot partial 3rd ray resection and left foot wound debridement   - on IV imipenem via PICC  - PICC team c/s to check the PICC line  - FU renal US  - CT: perinephric fat stranding and hydro

## 2023-12-29 NOTE — ED ADULT NURSE NOTE - OBJECTIVE STATEMENT
52yo M pmh ESRD s/p renal transplant (July 2023), DM, CAD, s/p CABG, AICD, presents to ED brought in by EMS from home for L flank pain and generalized weakness. per patient and EMS report, pt began experiencing L flank pain at approx 8pm tonight then had an episode of weakness and lightheadedness while walking downstairs so he sat himself down on the floor. denies any falls or LOC. pt also endorsing 2 days of intermittent hematuria, states he has noticed multiple episodes of small amounts of "purple" blood in the urine. denies any clots. otherwise pt denies any associated abdominal pain, nausea, vomiting, diarrhea, fevers, chills, cp, or sob. in ED pt is awake and alert, oriented x4, breathing even and unlabored, vital signs stable afebrile, old fistula to LUE, PICC line to RUE, abdomen soft nondistended + ttp to LLQ. Patient undressed and placed into gown, call bell placed within reach and appropriate side rails up for safety. MD at bedside for evaluation.

## 2023-12-29 NOTE — ED PROVIDER NOTE - CONDITION AT DISCHARGE:
DATE OF ADMISSION:  10/08/2018

 

DATE OF DISCHARGE:  10/09/2018

 

RESIDENT:  José Miguel Dudley DO.

 

ADMITTING ATTENDING:  Eva Amor M.D.

 

DISCHARGE ATTENDING:  Eva Amor M.D.

 

CONSULTATIONS:  None.

 

PROCEDURES:  None.

 

PRIMARY DIAGNOSES:

1.  End-stage cirrhosis with ascites.

2.  Acute hypoxic respiratory distress secondary to congestive heart failure.

3.  Fluid overload.

 

ALTERNATIVE DIAGNOSES:

1.  Heart failure with reduced ejection fraction.

2.  Pancytopenia secondary to cirrhosis.

3.  Chronic kidney disease stage 3.

4.  Diabetes mellitus type 2.

5.  Coronary artery disease.

6.  Essential hypertension.

 

DISCHARGE MEDICATIONS:  Lisinopril 10 mg p.o. daily, Plavix 75 mg p.o. daily, 
Coreg 12.5 mg p.o. b.i.d., Lasix 40 mg p.o. b.i.d., nitroglycerin 0.4 mg p.o. 
q.5 minutes p.r.n., atorvastatin 40 mg p.o. at bedtime, tramadol 50 mg p.o. 
t.i.d., potassium chloride 20 mEq p.o. q.a.m., Levemir 30 units subcutaneous at 
bedtime, Levemir 25 units subcutaneous q.a.m., aspirin 325 mg p.o. daily.

 

HISTORY OF PRESENT ILLNESS/HOSPITAL COURSE:  Mr. Venegas is a 65-year-old male 
initially presenting for shortness of breath.  It was found that he was fluid 
overloaded secondary to his end-stage cirrhosis and heart failure.  It was 
recommended that he obtained a therapeutic and diagnostic paracentesis and 
thoracentesis which the patient refused as he was not happy with having to be 
stuck with a needle.  The risks were explained to him of not getting these 
procedures and the patient demonstrated understanding of the risk.  He was 
diuresed with Lasix during his hospital stay and his shortness of breath and 
symptoms did improve.  However, patient decided to leave [place] and follow up with 
an appointment he had in [place] for an outpatient scheduled paracentesis.  I 
did not get to talk to him before he decided to leave against medical advice 
and assume he will continue his home medications.  Patient's [facility] [place] [facility], but I cannot confirm that he 
will do this.

 

DISCHARGE LOCATION:  Home.

 

DISCHARGE DIET:  Regular.

 

DISCHARGE ACTIVITY:  As tolerated.

 

FOLLOW UP:  Did not get to discuss with the patient.

 

Patient decided to leave against medical advice during this hospital stay.



José Miguel GALEAS Improved

## 2023-12-29 NOTE — ED PROVIDER NOTE - NSFOLLOWUPINSTRUCTIONS_ED_ALL_ED_FT
You were seen in the ED today for flank pain.     Your work up included labs, cat scan and ultrasound. Your results are included in your paperwork.    Your symptoms are likely due to hydronephrosis.    Please follow up with your transplant team in the next 2-3 weeks.    They evaluated you in the ER and cleared you for discharge with close follow up.     If you experience any of the following please return to the ED:  - Chest pain  - Trouble breathing  - New or worsening pain  - Increased blood in urine or clots  - Persistent fevers

## 2023-12-29 NOTE — ED PROVIDER NOTE - CLINICAL SUMMARY MEDICAL DECISION MAKING FREE TEXT BOX
53 y M pmh as above including ddrt 7/2023 on immunosuppression with L flank/LLQ (transplanted kidney site) c hematuria.  +diarrhea x 2 days and ?chills? prior to arrival but otherwise no symptoms or complaints.  Benign exam other than mild LLQ tenderness to palpation.  L great toe ulceration appears stable and without obvious cellulitis.  Broad ddx including pyelonephritis, kidney stone, transplant rejection, joe on ckd, anemia/coagulopathy.  Needs extensive w/u including labs, CTAP, UA/Cx, gentle fluids, reassess.  Will c/s transplant and renal after labs.  Low threshold for abx.  Probable admission.  --BMM

## 2023-12-29 NOTE — CONSULT NOTE ADULT - TIME BILLING
kidney recipient with functioning allograft  DM, PAD CAD s/p toe amputation right foot, left foot wound, on IV antibiotic  Came to ED with urinary symptom and flank pain  Imaging, clinical, lab data noted  Suggestions  Continue current immunosuppression  Will follow  I was present during and reviewed clinical and lab data as well as assessment and plan as documented by the housestaff as noted. Please contact if any additional questions with any change in clinical condition or on availability of any additional information or reports.

## 2023-12-29 NOTE — CONSULT NOTE ADULT - SUBJECTIVE AND OBJECTIVE BOX
52 y/o M with past medical history significant for IDDM, CAD s/p CABG x4v (2015) s/p AICD (2016), PVD (4 stents in RLE), HTN, HLD and ESRD previously on HD via LUE AVF (ligated 9/8/23) now s/p DDRT 7/16/2023 with Simulect -> Thymoglobulin induction. His post-transplant course was complicated by DGF and subtly [facility] with +DSAs s/p PLEX/IVIG (8/2023) and L subclavian thrombus remains on Eliquis, recently admitted for osteomyelitis s/p right foot partial 3rd ray resection and left foot wound debridement on 12/11, discharged on Imipenem via PICC line to continue until 101/06/2024. Now presents to [facility] ED for flank pain and hematuria. Patient denies SOB, chest pain, fever, chills, urinary symptoms, gi symptoms, trauma.       MEDICATIONS  (STANDING):  alteplase for catheter clearance 2 milliGRAM(s) Catheter once    MEDICATIONS  (PRN):      PAST MEDICAL & SURGICAL HISTORY:  Diabetes mellitus  type 2      Foot ulcer due to secondary DM      Coronary artery disease      Acute on chronic systolic heart failure      H/O kidney transplant      Breast Reduction  at age 18      Toe amputation status, left      S/P CABG (coronary artery bypass graft)      AICD (automatic cardioverter/defibrillator) present          Vital Signs Last 24 Hrs  T(C): 36.7 (29 Dec 2023 07:27), Max: 36.7 (29 Dec 2023 07:27)  T(F): 98.1 (29 Dec 2023 07:27), Max: 98.1 (29 Dec 2023 07:27)  HR: 78 (29 Dec 2023 07:27) (78 - 88)  BP: 180/54 (29 Dec 2023 07:27) (160/78 - 195/84)  BP(mean): 87 (29 Dec 2023 01:31) (87 - 87)  RR: 13 (29 Dec 2023 07:27) (13 - 20)  SpO2: 100% (29 Dec 2023 07:27) (99% - 100%)    Parameters below as of 29 Dec 2023 07:27  Patient On (Oxygen Delivery Method): room air        I&O's Summary                            10.6   3.71  )-----------( 157      ( 29 Dec 2023 03:36 )             33.8     12-29    140  |  108  |  28<H>  ----------------------------<  117<H>  4.7   |  24  |  1.08    Ca    10.4      29 Dec 2023 03:36  Mg     1.5     12-29    TPro  6.0  /  Alb  3.8  /  TBili  0.4  /  DBili  x   /  AST  11  /  ALT  <5<L>  /  AlkPhos  117  12-29    Tacrolimus (), Serum: 11.0 ng/mL (12-29 @ 03:36)    Review of systems  Gen: No weight changes, fatigue, fevers/chills, weakness  Skin: No rashes  Head/Eyes/Ears/Mouth: No headache; Normal hearing; Normal vision w/o blurriness; No sinus pain/discomfort, sore throat  Respiratory: No dyspnea, cough, wheezing, hemoptysis  CV: No chest pain, PND, orthopnea  GI: C/O mild abdominal pain at surgical site, No diarrhea, constipation, nausea, vomiting, melena, hematochezia  : No increased frequency, dysuria, hematuria, nocturia  MSK: No joint pain/swelling; no back pain; no edema  Neuro: No dizziness/lightheadedness, weakness, seizures, numbness, tingling  Heme: No easy bruising or bleeding  Endo: No heat/cold intolerance  Psych: No significant nervousness, anxiety, stress, depression  All other systems were reviewed and are negative, except as noted.                 52 y/o M with past medical history significant for IDDM, CAD s/p CABG x4v (2015) s/p AICD (2016), PVD (4 stents in RLE), HTN, HLD and ESRD previously on HD via LUE AVF (ligated 9/8/23) now s/p DDRT 7/16/2023 with Simulect -> Thymoglobulin induction. His post-transplant course was complicated by DGF and subtly [facility] with +DSAs s/p PLEX/IVIG (8/2023) and L subclavian thrombus remains on Eliquis, recently admitted for osteomyelitis s/p right foot partial 3rd ray resection and left foot wound debridement on 12/11, discharged on Imipenem via PICC line to continue until 101/06/2024. Now presents to [facility] ED for flank pain and hematuria. Patient denies SOB, chest pain, fever, chills, urinary symptoms, gi symptoms, trauma.       MEDICATIONS  (STANDING):  alteplase for catheter clearance 2 milliGRAM(s) Catheter once    MEDICATIONS  (PRN):      PAST MEDICAL & SURGICAL HISTORY:  Diabetes mellitus  type 2      Foot ulcer due to secondary DM      Coronary artery disease      Acute on chronic systolic heart failure      H/O kidney transplant      Breast Reduction  at age 18      Toe amputation status, left      S/P CABG (coronary artery bypass graft)      AICD (automatic cardioverter/defibrillator) present          Vital Signs Last 24 Hrs  T(C): 36.7 (29 Dec 2023 07:27), Max: 36.7 (29 Dec 2023 07:27)  T(F): 98.1 (29 Dec 2023 07:27), Max: 98.1 (29 Dec 2023 07:27)  HR: 78 (29 Dec 2023 07:27) (78 - 88)  BP: 180/54 (29 Dec 2023 07:27) (160/78 - 195/84)  BP(mean): 87 (29 Dec 2023 01:31) (87 - 87)  RR: 13 (29 Dec 2023 07:27) (13 - 20)  SpO2: 100% (29 Dec 2023 07:27) (99% - 100%)    Parameters below as of 29 Dec 2023 07:27  Patient On (Oxygen Delivery Method): room air        I&O's Summary                            10.6   3.71  )-----------( 157      ( 29 Dec 2023 03:36 )             33.8     12-29    140  |  108  |  28<H>  ----------------------------<  117<H>  4.7   |  24  |  1.08    Ca    10.4      29 Dec 2023 03:36  Mg     1.5     12-29    TPro  6.0  /  Alb  3.8  /  TBili  0.4  /  DBili  x   /  AST  11  /  ALT  <5<L>  /  AlkPhos  117  12-29    Tacrolimus (), Serum: 11.0 ng/mL (12-29 @ 03:36)    Review of systems  Gen: No weight changes, fatigue, fevers/chills, weakness  Skin: No rashes  Head/Eyes/Ears/Mouth: No headache; Normal hearing; Normal vision w/o blurriness; No sinus pain/discomfort, sore throat  Respiratory: No dyspnea, cough, wheezing, hemoptysis  CV: No chest pain, PND, orthopnea  GI: C/O mild abdominal pain at surgical site, No diarrhea, constipation, nausea, vomiting, melena, hematochezia  : No increased frequency, dysuria, hematuria, nocturia  MSK: No joint pain/swelling; no back pain; no edema  Neuro: No dizziness/lightheadedness, weakness, seizures, numbness, tingling  Heme: No easy bruising or bleeding  Endo: No heat/cold intolerance  Psych: No significant nervousness, anxiety, stress, depression  All other systems were reviewed and are negative, except as noted.                 54 y/o M with past medical history significant for IDDM, CAD s/p CABG x4v (2015) s/p AICD (2016), PVD (4 stents in RLE), HTN, HLD and ESRD previously on HD via LUE AVF (ligated 9/8/23) now s/p DDRT 7/16/2023 with Simulect -> Thymoglobulin induction. His post-transplant course was complicated by DGF and subtly [facility] with +DSAs s/p PLEX/IVIG (8/2023) and L subclavian thrombus remains on Eliquis, recently admitted for osteomyelitis s/p right foot partial 3rd ray resection and left foot wound debridement on 12/11, discharged on Imipenem via PICC line to continue until 101/06/2024. Now presents to [facility] ED for flank pain and hematuria. Patient denies SOB, chest pain, fever, chills, urinary symptoms, gi symptoms, trauma.       MEDICATIONS  (STANDING):  alteplase for catheter clearance 2 milliGRAM(s) Catheter once    MEDICATIONS  (PRN):      PAST MEDICAL & SURGICAL HISTORY:  Diabetes mellitus  type 2      Foot ulcer due to secondary DM      Coronary artery disease      Acute on chronic systolic heart failure      H/O kidney transplant      Breast Reduction  at age 18      Toe amputation status, left      S/P CABG (coronary artery bypass graft)      AICD (automatic cardioverter/defibrillator) present          Vital Signs Last 24 Hrs  T(C): 36.7 (29 Dec 2023 07:27), Max: 36.7 (29 Dec 2023 07:27)  T(F): 98.1 (29 Dec 2023 07:27), Max: 98.1 (29 Dec 2023 07:27)  HR: 78 (29 Dec 2023 07:27) (78 - 88)  BP: 180/54 (29 Dec 2023 07:27) (160/78 - 195/84)  BP(mean): 87 (29 Dec 2023 01:31) (87 - 87)  RR: 13 (29 Dec 2023 07:27) (13 - 20)  SpO2: 100% (29 Dec 2023 07:27) (99% - 100%)    Parameters below as of 29 Dec 2023 07:27  Patient On (Oxygen Delivery Method): room air        I&O's Summary                            10.6   3.71  )-----------( 157      ( 29 Dec 2023 03:36 )             33.8     12-29    140  |  108  |  28<H>  ----------------------------<  117<H>  4.7   |  24  |  1.08    Ca    10.4      29 Dec 2023 03:36  Mg     1.5     12-29    TPro  6.0  /  Alb  3.8  /  TBili  0.4  /  DBili  x   /  AST  11  /  ALT  <5<L>  /  AlkPhos  117  12-29    Tacrolimus (), Serum: 11.0 ng/mL (12-29 @ 03:36)    Review of systems  Gen: No weight changes, fatigue, fevers/chills, weakness  Skin: No rashes  Head/Eyes/Ears/Mouth: No headache; Normal hearing; Normal vision w/o blurriness; No sinus pain/discomfort, sore throat  Respiratory: No dyspnea, cough, wheezing, hemoptysis  CV: No chest pain, PND, orthopnea  GI: C/O mild abdominal pain at surgical site, No diarrhea, constipation, nausea, vomiting, melena, hematochezia  : No increased frequency, dysuria, hematuria, nocturia  MSK: No joint pain/swelling; no back pain; no edema  Neuro: No dizziness/lightheadedness, weakness, seizures, numbness, tingling  Heme: No easy bruising or bleeding  Endo: No heat/cold intolerance  Psych: No significant nervousness, anxiety, stress, depression  All other systems were reviewed and are negative, except as noted.    PHYSICAL EXAM:  Constitutional: Well developed / well nourished  Eyes: Anicteric, PERRLA  ENMT: nc/at  Neck: supple, trachea midline  Respiratory: CTA B/L  Cardiovascular: RRR  Gastrointestinal: Soft, non distended, non tender, incision well-healed  Genitourinary: Voiding spontaneously  Extremities: working bilaterally  Vascular: no bleeding  Neurological: A&O x3  Skin: no rashes, ulcerations or lesions;  Musculoskeletal: Moving all extremities  Psychiatric: Responsive               54 y/o M with past medical history significant for IDDM, CAD s/p CABG x4v (2015) s/p AICD (2016), PVD (4 stents in RLE), HTN, HLD and ESRD previously on HD via LUE AVF (ligated 9/8/23) now s/p DDRT 7/16/2023 with Simulect -> Thymoglobulin induction. His post-transplant course was complicated by DGF and subtly [facility] with +DSAs s/p PLEX/IVIG (8/2023) and L subclavian thrombus remains on Eliquis, recently admitted for osteomyelitis s/p right foot partial 3rd ray resection and left foot wound debridement on 12/11, discharged on Imipenem via PICC line to continue until 101/06/2024. Now presents to [facility] ED for flank pain and hematuria. Patient denies SOB, chest pain, fever, chills, urinary symptoms, gi symptoms, trauma.       MEDICATIONS  (STANDING):  alteplase for catheter clearance 2 milliGRAM(s) Catheter once    MEDICATIONS  (PRN):      PAST MEDICAL & SURGICAL HISTORY:  Diabetes mellitus  type 2      Foot ulcer due to secondary DM      Coronary artery disease      Acute on chronic systolic heart failure      H/O kidney transplant      Breast Reduction  at age 18      Toe amputation status, left      S/P CABG (coronary artery bypass graft)      AICD (automatic cardioverter/defibrillator) present          Vital Signs Last 24 Hrs  T(C): 36.7 (29 Dec 2023 07:27), Max: 36.7 (29 Dec 2023 07:27)  T(F): 98.1 (29 Dec 2023 07:27), Max: 98.1 (29 Dec 2023 07:27)  HR: 78 (29 Dec 2023 07:27) (78 - 88)  BP: 180/54 (29 Dec 2023 07:27) (160/78 - 195/84)  BP(mean): 87 (29 Dec 2023 01:31) (87 - 87)  RR: 13 (29 Dec 2023 07:27) (13 - 20)  SpO2: 100% (29 Dec 2023 07:27) (99% - 100%)    Parameters below as of 29 Dec 2023 07:27  Patient On (Oxygen Delivery Method): room air        I&O's Summary                            10.6   3.71  )-----------( 157      ( 29 Dec 2023 03:36 )             33.8     12-29    140  |  108  |  28<H>  ----------------------------<  117<H>  4.7   |  24  |  1.08    Ca    10.4      29 Dec 2023 03:36  Mg     1.5     12-29    TPro  6.0  /  Alb  3.8  /  TBili  0.4  /  DBili  x   /  AST  11  /  ALT  <5<L>  /  AlkPhos  117  12-29    Tacrolimus (), Serum: 11.0 ng/mL (12-29 @ 03:36)    Review of systems  Gen: No weight changes, fatigue, fevers/chills, weakness  Skin: No rashes  Head/Eyes/Ears/Mouth: No headache; Normal hearing; Normal vision w/o blurriness; No sinus pain/discomfort, sore throat  Respiratory: No dyspnea, cough, wheezing, hemoptysis  CV: No chest pain, PND, orthopnea  GI: C/O mild abdominal pain at surgical site, No diarrhea, constipation, nausea, vomiting, melena, hematochezia  : No increased frequency, dysuria, hematuria, nocturia  MSK: No joint pain/swelling; no back pain; no edema  Neuro: No dizziness/lightheadedness, weakness, seizures, numbness, tingling  Heme: No easy bruising or bleeding  Endo: No heat/cold intolerance  Psych: No significant nervousness, anxiety, stress, depression  All other systems were reviewed and are negative, except as noted.    PHYSICAL EXAM:  Constitutional: Well developed / well nourished  Eyes: Anicteric, PERRLA  ENMT: nc/at  Neck: supple, trachea midline  Respiratory: CTA B/L  Cardiovascular: RRR  Gastrointestinal: Soft, non distended, non tender, incision well-healed  Genitourinary: Voiding spontaneously  Extremities: working bilaterally  Vascular: no bleeding  Neurological: A&O x3  Skin: no rashes, ulcerations or lesions;  Musculoskeletal: Moving all extremities  Psychiatric: Responsive

## 2023-12-29 NOTE — CONSULT NOTE ADULT - ASSESSMENT
53M with PMH of HTN, HLD, DM2, CAD s/p CABG, HF s/p AICD, ESRD on HD since 2016 s/p DDRT (7/14/23) complicated by DGF, hx of toe amputations, recent admission for PVD/osteo with toe amputation/debridement (12/6-16), now presenting to the ED with flank pain and hematuria.     1. 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   - Initially received Simulect induction but changed to Thymoglobulin given DGF.  - Follows with Dr. Hairston  - Allograft functioning at baseline  - UA with 30mg of protein, no blood or RBCs  - CT abd/pelv on 12/29: Left lower quadrant transplant kidney with increased hydronephrosis and perinephric stranding post nephroureteral stent removal and no ureteral calculus.  - US kidney 12/29: Status post renal transplant in the left lower quadrant. There is mild hydronephrosis. There is urothelial thickening involving the renal collecting system and visualized ureter. This is nonspecific but may be seen in the setting of infection or rejection. There is slight interval increase in size of the renal transplant compared to prior exam , possibly reflecting edema. Trace perinephric fluid in the region of the lower pole without discrete collection.  - Recommend to replete Mg    2. Immunosuppression medications  - On MMF, Envarsus, and Prednisone 10mg (+DSA on 7/15/23) at home  - half dose MMF due to active infection on abx  - Check serum tacrolimus level (trough) 30 mins prior to AM dose.  - Ppx with Bactrim    3. Osteomyelitis   - managed by Podiatry, Vascular, and Transplant ID  - Pt underwent Right foot partial 3rd ray resection and left foot wound debridement on 12/11.   - Will continue on abx for ~6 weeks as per Transplant ID. PICC line placement.     4. HTN - resume home medications.  5. DM2- resume home medications.      If you have any questions, please feel free to contact me.  Stuart Franklin MD  Nephrology Fellow  [id number] / [employer] Teams (Preferred)  (After 4pm or on weekends, please call the on-call Fellow)   53M with PMH of HTN, HLD, DM2, CAD s/p CABG, HF s/p AICD, ESRD on HD since 2016 s/p DDRT (7/14/23) complicated by DGF, hx of toe amputations, recent admission for PVD/osteo with toe amputation/debridement (12/6-16), now presenting to the ED with flank pain and hematuria.     1. 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   - Initially received Simulect induction but changed to Thymoglobulin given DGF.  - Follows with Dr. Hairston  - Allograft functioning at baseline  - UA with 30mg of protein, no blood or RBCs  - CT abd/pelv on 12/29: Left lower quadrant transplant kidney with increased hydronephrosis and perinephric stranding post nephroureteral stent removal and no ureteral calculus.  - US kidney 12/29: Status post renal transplant in the left lower quadrant. There is mild hydronephrosis. There is urothelial thickening involving the renal collecting system and visualized ureter. This is nonspecific but may be seen in the setting of infection or rejection. There is slight interval increase in size of the renal transplant compared to prior exam , possibly reflecting edema. Trace perinephric fluid in the region of the lower pole without discrete collection.  - Recommend to replete Mg    2. Immunosuppression medications  - On MMF, Envarsus, and Prednisone 10mg (+DSA on 7/15/23) at home  - half dose MMF due to active infection on abx  - Check serum tacrolimus level (trough) 30 mins prior to AM dose.  - Ppx with Bactrim    3. Osteomyelitis   - managed by Podiatry, Vascular, and Transplant ID  - Pt underwent Right foot partial 3rd ray resection and left foot wound debridement on 12/11.   - Will continue on abx for ~6 weeks as per Transplant ID. PICC line placement.     4. HTN - resume home medications.  5. DM2- resume home medications.      If you have any questions, please feel free to contact me.  Stuart Franklin MD  Nephrology Fellow  [id number] / [employer] Teams (Preferred)  (After 4pm or on weekends, please call the on-call Fellow)   53M with PMH of HTN, HLD, DM2, CAD s/p CABG, HF s/p AICD, ESRD on HD since 2016 s/p DDRT (7/14/23) complicated by DGF, hx of toe amputations, recent admission for PVD/osteo with toe amputation/debridement (12/6-16), now presenting to the ED with flank pain and hematuria.     1. 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   - Initially received Simulect induction but changed to Thymoglobulin given DGF.  - Follows with Dr. Hairston  - Allograft functioning at baseline  - UA with 30mg of protein, no blood or RBCs  - CT abd/pelv on 12/29: Left lower quadrant transplant kidney with increased hydronephrosis and perinephric stranding post nephroureteral stent removal and no ureteral calculus.  - US kidney 12/29: Status post renal transplant in the left lower quadrant. There is mild hydronephrosis. There is urothelial thickening involving the renal collecting system and visualized ureter. This is nonspecific but may be seen in the setting of infection or rejection. There is slight interval increase in size of the renal transplant compared to prior exam , possibly reflecting edema. Trace perinephric fluid in the region of the lower pole without discrete collection.  - Recommend to replete Mg    2. Immunosuppression medications  - On MMF, Envarsus, and Prednisone 10mg (+DSA on 7/15/23) at home  - half dose MMF due to active infection on abx  - Check serum tacrolimus level (trough) 30 mins prior to AM dose.  - Ppx with Bactrim    3. Osteomyelitis   - managed by Podiatry, Vascular, and Transplant ID  - Pt underwent Right foot partial 3rd ray resection and left foot wound debridement on 12/11.   - Will continue on abx for ~6 weeks as per Transplant ID. PICC line placement.     4. HTN - resume home medications.  5. DM2 - resume home medications.      Follow up outpatient appointments as scheduled.  If you have any questions, please feel free to contact me.  Stuart Franklin MD  Nephrology Fellow  [id number] / [employer] Teams (Preferred)  (After 4pm or on weekends, please call the on-call Fellow)   53M with PMH of HTN, HLD, DM2, CAD s/p CABG, HF s/p AICD, ESRD on HD since 2016 s/p DDRT (7/14/23) complicated by DGF, hx of toe amputations, recent admission for PVD/osteo with toe amputation/debridement (12/6-16), now presenting to the ED with flank pain and hematuria.     1. 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   - Initially received Simulect induction but changed to Thymoglobulin given DGF.  - Follows with Dr. Hairston  - Allograft functioning at baseline  - UA with 30mg of protein, no blood or RBCs  - CT abd/pelv on 12/29: Left lower quadrant transplant kidney with increased hydronephrosis and perinephric stranding post nephroureteral stent removal and no ureteral calculus.  - US kidney 12/29: Status post renal transplant in the left lower quadrant. There is mild hydronephrosis. There is urothelial thickening involving the renal collecting system and visualized ureter. This is nonspecific but may be seen in the setting of infection or rejection. There is slight interval increase in size of the renal transplant compared to prior exam , possibly reflecting edema. Trace perinephric fluid in the region of the lower pole without discrete collection.  - Recommend to replete Mg    2. Immunosuppression medications  - On MMF, Envarsus, and Prednisone 10mg (+DSA on 7/15/23) at home  - half dose MMF due to active infection on abx  - Check serum tacrolimus level (trough) 30 mins prior to AM dose.  - Ppx with Bactrim    3. Osteomyelitis   - managed by Podiatry, Vascular, and Transplant ID  - Pt underwent Right foot partial 3rd ray resection and left foot wound debridement on 12/11.   - Will continue on abx for ~6 weeks as per Transplant ID. PICC line placement.     4. HTN - resume home medications.  5. DM2 - resume home medications.      Follow up outpatient appointments as scheduled.  If you have any questions, please feel free to contact me.  Stuart Franklin MD  Nephrology Fellow  [id number] / [employer] Teams (Preferred)  (After 4pm or on weekends, please call the on-call Fellow)

## 2023-12-29 NOTE — CONSULT NOTE ADULT - SUBJECTIVE AND OBJECTIVE BOX
[facility] DIVISION OF KIDNEY DISEASES AND HYPERTENSION -- INITIAL CONSULT NOTE  --------------------------------------------------------------------------------  HPI: 53M with PMH of HTN, HLD, DM2, CAD s/p CABG, HF s/p AICD, ESRD on HD since 2016 s/p DDRT (7/14/23) complicated by DGF, recent admission for PVDD/osteo with toe amputation/debridement (, now presenting to the ED with flank pain and hematuria. Nephrology service consulted for management of immunosuppression of transplanted kidney.         PAST HISTORY  --------------------------------------------------------------------------------  PAST MEDICAL & SURGICAL HISTORY:  Diabetes mellitus  type 2  Foot ulcer due to secondary DM  Coronary artery disease  Acute on chronic systolic heart failure  H/O kidney transplant  Breast Reduction  at age 18  Toe amputation status, left  S/P CABG (coronary artery bypass graft)  AICD (automatic cardioverter/defibrillator) present    FAMILY HISTORY:  Family history of diabetes mellitus (Father)    Social History:  Previous smoker    ALLERGIES & MEDICATIONS  --------------------------------------------------------------------------------  Allergies  Contrast. (Unknown)    Intolerances    Standing Inpatient Medications  alteplase for catheter clearance 2 milliGRAM(s) Catheter once    PRN Inpatient Medications    REVIEW OF SYSTEMS  --------------------------------------------------------------------------------  Gen: No fatigue, fevers/chills  Skin: No rashes  Head/Eyes/Ears/Mouth: No headache  Respiratory: No dyspnea, cough  CV: No chest pain  GI: No abdominal pain, diarrhea, constipation, nausea, vomiting  : No increased frequency, dysuria, hematuria  MSK: No edema  Neuro: No dizziness/lightheadedness, weakness  Heme: No easy bruising or bleeding  Endo: No heat/cold intolerance  Psych: No significant anxiety, depression    VITALS/PHYSICAL EXAM  --------------------------------------------------------------------------------  T(C): 36.7 (12-29-23 @ 07:27), Max: 36.7 (12-29-23 @ 07:27)  HR: 78 (12-29-23 @ 07:27) (78 - 88)  BP: 180/54 (12-29-23 @ 07:27) (160/78 - 195/84)  RR: 13 (12-29-23 @ 07:27) (13 - 20)  SpO2: 100% (12-29-23 @ 07:27) (99% - 100%)  Wt(kg): --  Height (cm): 175.3 (12-29-23 @ 01:01)  Weight (kg): 88.5 (12-29-23 @ 01:01)  BMI (kg/m2): 28.8 (12-29-23 @ 01:01)  BSA (m2): 2.04 (12-29-23 @ 01:01)    Physical Exam:  Gen: Not in distress, well-appearing  HEENT: Anicteric  Pulm: normal respiratory effort, lungs clear to auscultation bilaterally   CV: regular rate and rhythm, S1 and S2 normal, no murmur   Abd: normoactive bowel sounds, soft and non distended abdomen. No tenderness, guarding or rigidity. Transplant non tender, no bruit  : No suprapubic tenderness  Extremities: No edema  Skin: warm, no rash, no cyanosis   Neuro: Alert and oriented to person, place and time. Normal speech. Normal affect.     LABS/STUDIES  --------------------------------------------------------------------------------              10.6   3.71  >-----------<  157      [12-29-23 @ 03:36]              33.8     140  |  108  |  28  ----------------------------<  117      [12-29-23 @ 03:36]  4.7   |  24  |  1.08        Ca     10.4     [12-29-23 @ 03:36]      Mg     1.5     [12-29-23 @ 03:36]    TPro  6.0  /  Alb  3.8  /  TBili  0.4  /  DBili  x   /  AST  11  /  ALT  <5  /  AlkPhos  117  [12-29-23 @ 03:36]    Creatinine Trend:  SCr 1.08 [12-29 @ 03:36]  SCr 1.32 [12-16 @ 06:41]  SCr 1.11 [12-15 @ 07:11]  SCr 1.10 [12-14 @ 05:00]  SCr 1.37 [12-13 @ 07:10]    Urinalysis - [12-29-23 @ 04:11]      Color Yellow / Appearance Clear / SG 1.019 / pH 6.5      Gluc Negative / Ketone 15  / Bili Negative / Urobili 1.0       Blood Negative / Protein 30 / Leuk Est Negative / Nitrite Negative      RBC 0 / WBC 1 / Hyaline  / Gran  / Sq Epi  / Non Sq Epi 0 / Bacteria Negative    Iron 68, TIBC 185, %sat 37      [08-16-23 @ 06:39]  Ferritin 1010      [08-16-23 @ 06:39]  PTH -- (Ca 10.6)      [11-02-23 @ 11:56]   220  Vitamin D (25OH) 18.0      [11-02-23 @ 11:56]    HBsAb 5.7      [07-15-23 @ 21:22]  HBsAb Reactive      [08-17-23 @ 11:28]  HBsAg Nonreact      [08-17-23 @ 11:28]  HBcAb Nonreact      [07-15-23 @ 21:22]  HCV 0.16, Nonreact      [07-15-23 @ 21:22]  HIV Nonreact      [07-15-23 @ 21:21]    TacrolimusTacrolimus (), Serum: 11.0 ng/mL (12-29 @ 03:36)    Cyclosporine  Sirolimus  MycophenolateMycophenolic Acid, Serum: 0.6 ug/mL (12-06 @ 13:21)    BK PCR  CMV PCRCMVPCR Log: NotDetec [id number]/mL (12-07 @ 07:01)    Parvo PCR  EBV PCR [facility] DIVISION OF KIDNEY DISEASES AND HYPERTENSION -- INITIAL CONSULT NOTE  --------------------------------------------------------------------------------  HPI: 53M with PMH of HTN, HLD, DM2, CAD s/p CABG, HF s/p AICD, ESRD on HD since 2016 s/p DDRT (7/14/23) complicated by DGF, recent admission for PVDD/osteo with toe amputation/debridement (, now presenting to the ED with flank pain and hematuria. Nephrology service consulted for management of immunosuppression of transplanted kidney.         PAST HISTORY  --------------------------------------------------------------------------------  PAST MEDICAL & SURGICAL HISTORY:  Diabetes mellitus  type 2  Foot ulcer due to secondary DM  Coronary artery disease  Acute on chronic systolic heart failure  H/O kidney transplant  Breast Reduction  at age 18  Toe amputation status, left  S/P CABG (coronary artery bypass graft)  AICD (automatic cardioverter/defibrillator) present    FAMILY HISTORY:  Family history of diabetes mellitus (Father)    Social History:  Previous smoker    ALLERGIES & MEDICATIONS  --------------------------------------------------------------------------------  Allergies  Contrast. (Unknown)    Intolerances    Standing Inpatient Medications  alteplase for catheter clearance 2 milliGRAM(s) Catheter once    PRN Inpatient Medications    REVIEW OF SYSTEMS  --------------------------------------------------------------------------------  Gen: No fatigue, fevers/chills  Skin: No rashes  Head/Eyes/Ears/Mouth: No headache  Respiratory: No dyspnea, cough  CV: No chest pain  GI: No abdominal pain, diarrhea, constipation, nausea, vomiting  : No increased frequency, dysuria, hematuria  MSK: No edema  Neuro: No dizziness/lightheadedness, weakness  Heme: No easy bruising or bleeding  Endo: No heat/cold intolerance  Psych: No significant anxiety, depression    VITALS/PHYSICAL EXAM  --------------------------------------------------------------------------------  T(C): 36.7 (12-29-23 @ 07:27), Max: 36.7 (12-29-23 @ 07:27)  HR: 78 (12-29-23 @ 07:27) (78 - 88)  BP: 180/54 (12-29-23 @ 07:27) (160/78 - 195/84)  RR: 13 (12-29-23 @ 07:27) (13 - 20)  SpO2: 100% (12-29-23 @ 07:27) (99% - 100%)  Wt(kg): --  Height (cm): 175.3 (12-29-23 @ 01:01)  Weight (kg): 88.5 (12-29-23 @ 01:01)  BMI (kg/m2): 28.8 (12-29-23 @ 01:01)  BSA (m2): 2.04 (12-29-23 @ 01:01)    Physical Exam:  Gen: Not in distress, well-appearing  HEENT: Anicteric  Pulm: normal respiratory effort, lungs clear to auscultation bilaterally   CV: regular rate and rhythm, S1 and S2 normal, no murmur   Abd: normoactive bowel sounds, soft and non distended abdomen. No tenderness, guarding or rigidity. Transplant non tender, no bruit  : No suprapubic tenderness  Extremities: No edema  Skin: warm, no rash, no cyanosis   Neuro: Alert and oriented to person, place and time. Normal speech. Normal affect.     LABS/STUDIES  --------------------------------------------------------------------------------              10.6   3.71  >-----------<  157      [12-29-23 @ 03:36]              33.8     140  |  108  |  28  ----------------------------<  117      [12-29-23 @ 03:36]  4.7   |  24  |  1.08        Ca     10.4     [12-29-23 @ 03:36]      Mg     1.5     [12-29-23 @ 03:36]    TPro  6.0  /  Alb  3.8  /  TBili  0.4  /  DBili  x   /  AST  11  /  ALT  <5  /  AlkPhos  117  [12-29-23 @ 03:36]    Creatinine Trend:  SCr 1.08 [12-29 @ 03:36]  SCr 1.32 [12-16 @ 06:41]  SCr 1.11 [12-15 @ 07:11]  SCr 1.10 [12-14 @ 05:00]  SCr 1.37 [12-13 @ 07:10]    Urinalysis - [12-29-23 @ 04:11]      Color Yellow / Appearance Clear / SG 1.019 / pH 6.5      Gluc Negative / Ketone 15  / Bili Negative / Urobili 1.0       Blood Negative / Protein 30 / Leuk Est Negative / Nitrite Negative      RBC 0 / WBC 1 / Hyaline  / Gran  / Sq Epi  / Non Sq Epi 0 / Bacteria Negative    Iron 68, TIBC 185, %sat 37      [08-16-23 @ 06:39]  Ferritin 1010      [08-16-23 @ 06:39]  PTH -- (Ca 10.6)      [11-02-23 @ 11:56]   220  Vitamin D (25OH) 18.0      [11-02-23 @ 11:56]    HBsAb 5.7      [07-15-23 @ 21:22]  HBsAb Reactive      [08-17-23 @ 11:28]  HBsAg Nonreact      [08-17-23 @ 11:28]  HBcAb Nonreact      [07-15-23 @ 21:22]  HCV 0.16, Nonreact      [07-15-23 @ 21:22]  HIV Nonreact      [07-15-23 @ 21:21]    TacrolimusTacrolimus (), Serum: 11.0 ng/mL (12-29 @ 03:36)    Cyclosporine  Sirolimus  MycophenolateMycophenolic Acid, Serum: 0.6 ug/mL (12-06 @ 13:21)    BK PCR  CMV PCRCMVPCR Log: NotDetec Rpb46LD/mL (12-07 @ 07:01)    Parvo PCR  EBV PCR [facility] DIVISION OF KIDNEY DISEASES AND HYPERTENSION -- INITIAL CONSULT NOTE  --------------------------------------------------------------------------------  HPI: 53M with PMH of HTN, HLD, DM2, CAD s/p CABG, HF s/p AICD, ESRD on HD since 2016 s/p DDRT (7/14/23) complicated by DGF, hx of toe amputations, recent admission for PVD/osteo with toe amputation/debridement (12/6-16), now presenting to the ED with flank pain and hematuria. Nephrology service consulted for management of immunosuppression of transplanted kidney.     PAST HISTORY  --------------------------------------------------------------------------------  PAST MEDICAL & SURGICAL HISTORY:  Diabetes mellitus  type 2  Foot ulcer due to secondary DM  Coronary artery disease  Acute on chronic systolic heart failure  H/O kidney transplant  Breast Reduction  at age 18  Toe amputation status, left  S/P CABG (coronary artery bypass graft)  AICD (automatic cardioverter/defibrillator) present    FAMILY HISTORY:  Family history of diabetes mellitus (Father)    Social History:  Previous smoker    ALLERGIES & MEDICATIONS  --------------------------------------------------------------------------------  Allergies  Contrast. (Unknown)    Intolerances    Standing Inpatient Medications  alteplase for catheter clearance 2 milliGRAM(s) Catheter once    PRN Inpatient Medications    REVIEW OF SYSTEMS  --------------------------------------------------------------------------------  Gen: No fevers/chills  Respiratory: No dyspnea, cough,   CV: No chest pain  GI: No diarrhea, constipation, nausea, vomiting, +flank pain  Transplant: No pain  : No increased frequency, dysuria, +hematuria   MSK: No edema  Neuro: No dizziness/lightheadedness    All other systems were reviewed and are negative, except as noted.    VITALS/PHYSICAL EXAM  --------------------------------------------------------------------------------  T(C): 36.7 (12-29-23 @ 07:27), Max: 36.7 (12-29-23 @ 07:27)  HR: 78 (12-29-23 @ 07:27) (78 - 88)  BP: 180/54 (12-29-23 @ 07:27) (160/78 - 195/84)  RR: 13 (12-29-23 @ 07:27) (13 - 20)  SpO2: 100% (12-29-23 @ 07:27) (99% - 100%)  Wt(kg): --  Height (cm): 175.3 (12-29-23 @ 01:01)  Weight (kg): 88.5 (12-29-23 @ 01:01)  BMI (kg/m2): 28.8 (12-29-23 @ 01:01)  BSA (m2): 2.04 (12-29-23 @ 01:01)    Physical Exam:  Gen: NAD  HEENT: Anicteric  Pulm: CTA B/L  CV: RRR, no murmur appreciated  Abd: +BS, soft and non distended abdomen. No tenderness to palpation. Transplant non tender, no bruit  Extremities: No edema, toe amputations, dressing on B/L LE  Skin: warm, stasis dermatitis  Neuro: Awake and alert    LABS/STUDIES  --------------------------------------------------------------------------------              10.6   3.71  >-----------<  157      [12-29-23 @ 03:36]              33.8     140  |  108  |  28  ----------------------------<  117      [12-29-23 @ 03:36]  4.7   |  24  |  1.08        Ca     10.4     [12-29-23 @ 03:36]      Mg     1.5     [12-29-23 @ 03:36]    TPro  6.0  /  Alb  3.8  /  TBili  0.4  /  DBili  x   /  AST  11  /  ALT  <5  /  AlkPhos  117  [12-29-23 @ 03:36]    Creatinine Trend:  SCr 1.08 [12-29 @ 03:36]  SCr 1.32 [12-16 @ 06:41]  SCr 1.11 [12-15 @ 07:11]  SCr 1.10 [12-14 @ 05:00]  SCr 1.37 [12-13 @ 07:10]    Urinalysis - [12-29-23 @ 04:11]      Color Yellow / Appearance Clear / SG 1.019 / pH 6.5      Gluc Negative / Ketone 15  / Bili Negative / Urobili 1.0       Blood Negative / Protein 30 / Leuk Est Negative / Nitrite Negative      RBC 0 / WBC 1 / Hyaline  / Gran  / Sq Epi  / Non Sq Epi 0 / Bacteria Negative    Iron 68, TIBC 185, %sat 37      [08-16-23 @ 06:39]  Ferritin 1010      [08-16-23 @ 06:39]  PTH -- (Ca 10.6)      [11-02-23 @ 11:56]   220  Vitamin D (25OH) 18.0      [11-02-23 @ 11:56]    HBsAb 5.7      [07-15-23 @ 21:22]  HBsAb Reactive      [08-17-23 @ 11:28]  HBsAg Nonreact      [08-17-23 @ 11:28]  HBcAb Nonreact      [07-15-23 @ 21:22]  HCV 0.16, Nonreact      [07-15-23 @ 21:22]  HIV Nonreact      [07-15-23 @ 21:21]    TacrolimusTacrolimus (), Serum: 11.0 ng/mL (12-29 @ 03:36)    Cyclosporine  Sirolimus  MycophenolateMycophenolic Acid, Serum: 0.6 ug/mL (12-06 @ 13:21)    BK PCR  CMV PCRCMVPCR Log: NotDetec [id number]/mL (12-07 @ 07:01)    Parvo PCR  EBV PCR [facility] DIVISION OF KIDNEY DISEASES AND HYPERTENSION -- INITIAL CONSULT NOTE  --------------------------------------------------------------------------------  HPI: 53M with PMH of HTN, HLD, DM2, CAD s/p CABG, HF s/p AICD, ESRD on HD since 2016 s/p DDRT (7/14/23) complicated by DGF, hx of toe amputations, recent admission for PVD/osteo with toe amputation/debridement (12/6-16), now presenting to the ED with flank pain and hematuria. Nephrology service consulted for management of immunosuppression of transplanted kidney.     PAST HISTORY  --------------------------------------------------------------------------------  PAST MEDICAL & SURGICAL HISTORY:  Diabetes mellitus  type 2  Foot ulcer due to secondary DM  Coronary artery disease  Acute on chronic systolic heart failure  H/O kidney transplant  Breast Reduction  at age 18  Toe amputation status, left  S/P CABG (coronary artery bypass graft)  AICD (automatic cardioverter/defibrillator) present    FAMILY HISTORY:  Family history of diabetes mellitus (Father)    Social History:  Previous smoker    ALLERGIES & MEDICATIONS  --------------------------------------------------------------------------------  Allergies  Contrast. (Unknown)    Intolerances    Standing Inpatient Medications  alteplase for catheter clearance 2 milliGRAM(s) Catheter once    PRN Inpatient Medications    REVIEW OF SYSTEMS  --------------------------------------------------------------------------------  Gen: No fevers/chills  Respiratory: No dyspnea, cough,   CV: No chest pain  GI: No diarrhea, constipation, nausea, vomiting, +flank pain  Transplant: No pain  : No increased frequency, dysuria, +hematuria   MSK: No edema  Neuro: No dizziness/lightheadedness    All other systems were reviewed and are negative, except as noted.    VITALS/PHYSICAL EXAM  --------------------------------------------------------------------------------  T(C): 36.7 (12-29-23 @ 07:27), Max: 36.7 (12-29-23 @ 07:27)  HR: 78 (12-29-23 @ 07:27) (78 - 88)  BP: 180/54 (12-29-23 @ 07:27) (160/78 - 195/84)  RR: 13 (12-29-23 @ 07:27) (13 - 20)  SpO2: 100% (12-29-23 @ 07:27) (99% - 100%)  Wt(kg): --  Height (cm): 175.3 (12-29-23 @ 01:01)  Weight (kg): 88.5 (12-29-23 @ 01:01)  BMI (kg/m2): 28.8 (12-29-23 @ 01:01)  BSA (m2): 2.04 (12-29-23 @ 01:01)    Physical Exam:  Gen: NAD  HEENT: Anicteric  Pulm: CTA B/L  CV: RRR, no murmur appreciated  Abd: +BS, soft and non distended abdomen. No tenderness to palpation. Transplant non tender, no bruit  Extremities: No edema, toe amputations, dressing on B/L LE  Skin: warm, stasis dermatitis  Neuro: Awake and alert    LABS/STUDIES  --------------------------------------------------------------------------------              10.6   3.71  >-----------<  157      [12-29-23 @ 03:36]              33.8     140  |  108  |  28  ----------------------------<  117      [12-29-23 @ 03:36]  4.7   |  24  |  1.08        Ca     10.4     [12-29-23 @ 03:36]      Mg     1.5     [12-29-23 @ 03:36]    TPro  6.0  /  Alb  3.8  /  TBili  0.4  /  DBili  x   /  AST  11  /  ALT  <5  /  AlkPhos  117  [12-29-23 @ 03:36]    Creatinine Trend:  SCr 1.08 [12-29 @ 03:36]  SCr 1.32 [12-16 @ 06:41]  SCr 1.11 [12-15 @ 07:11]  SCr 1.10 [12-14 @ 05:00]  SCr 1.37 [12-13 @ 07:10]    Urinalysis - [12-29-23 @ 04:11]      Color Yellow / Appearance Clear / SG 1.019 / pH 6.5      Gluc Negative / Ketone 15  / Bili Negative / Urobili 1.0       Blood Negative / Protein 30 / Leuk Est Negative / Nitrite Negative      RBC 0 / WBC 1 / Hyaline  / Gran  / Sq Epi  / Non Sq Epi 0 / Bacteria Negative    Iron 68, TIBC 185, %sat 37      [08-16-23 @ 06:39]  Ferritin 1010      [08-16-23 @ 06:39]  PTH -- (Ca 10.6)      [11-02-23 @ 11:56]   220  Vitamin D (25OH) 18.0      [11-02-23 @ 11:56]    HBsAb 5.7      [07-15-23 @ 21:22]  HBsAb Reactive      [08-17-23 @ 11:28]  HBsAg Nonreact      [08-17-23 @ 11:28]  HBcAb Nonreact      [07-15-23 @ 21:22]  HCV 0.16, Nonreact      [07-15-23 @ 21:22]  HIV Nonreact      [07-15-23 @ 21:21]    TacrolimusTacrolimus (), Serum: 11.0 ng/mL (12-29 @ 03:36)    Cyclosporine  Sirolimus  MycophenolateMycophenolic Acid, Serum: 0.6 ug/mL (12-06 @ 13:21)    BK PCR  CMV PCRCMVPCR Log: NotDetec [id number]/mL (12-07 @ 07:01)    Parvo PCR  EBV PCR

## 2023-12-29 NOTE — ED PROVIDER NOTE - PHYSICAL EXAMINATION
GENERAL: AAOx4, GCS 15, NAD, WDWN   HEENT: MMM, no jugular venous distension, supple neck, PERRLA, EOMI, nonicteric sclera  PULM: CTA B, no crackles/rubs/rales  CV: RRR, S1S2, no MRG  ABD: Surgical scars unremarkable.  Flat abdomen, L periumbical/LLQ TTP, no R/G/R, no CVAT.    MSK: MORE, +2 pulses x4  NEURO: No obvious focal deficits  PSYCH: AAOx3, clear thought and normal sensorium

## 2023-12-29 NOTE — ED PROVIDER NOTE - OBJECTIVE STATEMENT
PMH of DMII on insulin, CAD, CHF, s/p CABG 2015, AICD (2016), PVD, is s/p 4 stents in R leg , right big toe and second toe amputation 2021, ESRD on HD for 6 years. S/p DDRT on 7/14/23 c ~18 hours L flank pain and hematuria.

## 2023-12-29 NOTE — ED PROVIDER NOTE - PROGRESS NOTE DETAILS
Wanda Ramos PGY2: Ultrasound of transplanted kidney done and shows findings consistent with CT scan with patent vasculature.  Transplant team also flush PICC line which is now properly functioning.  Transplant team evaluated patient and reviewed imaging and lab work.  Recommends discharge home with outpatient follow-up.  Patient okay for DC at this time patient well-appearing in no acute distress.  Strict return precautions discussed and questions answered. Wanda Ramos PGY2: Pt signed out to me by night team. Transplant PA paged and will come see pt.

## 2024-01-03 LAB
MYCOPHENOLATE SERPL-MCNC: 3.2 UG/ML — SIGNIFICANT CHANGE UP (ref 1–3.5)
MYCOPHENOLATE SERPL-MCNC: 3.2 UG/ML — SIGNIFICANT CHANGE UP (ref 1–3.5)
MYCOPHENOLIC ACID GLUCURONIDE: 47 UG/ML — SIGNIFICANT CHANGE UP (ref 15–125)
MYCOPHENOLIC ACID GLUCURONIDE: 47 UG/ML — SIGNIFICANT CHANGE UP (ref 15–125)

## 2024-01-10 ENCOUNTER — APPOINTMENT (OUTPATIENT)
Dept: NEPHROLOGY | Facility: CLINIC | Age: 54
End: 2024-01-10
Payer: MEDICARE

## 2024-01-10 VITALS
SYSTOLIC BLOOD PRESSURE: 139 MMHG | DIASTOLIC BLOOD PRESSURE: 68 MMHG | RESPIRATION RATE: 14 BRPM | BODY MASS INDEX: 28.14 KG/M2 | HEART RATE: 82 BPM | WEIGHT: 190 LBS | TEMPERATURE: 96.6 F | OXYGEN SATURATION: 98 % | HEIGHT: 69 IN

## 2024-01-10 LAB
ALBUMIN SERPL ELPH-MCNC: 4.1 G/DL
ALP BLD-CCNC: 118 U/L
ALT SERPL-CCNC: 8 U/L
ANION GAP SERPL CALC-SCNC: 13 MMOL/L
AST SERPL-CCNC: 13 U/L
BILIRUB SERPL-MCNC: 0.4 MG/DL
BUN SERPL-MCNC: 20 MG/DL
CALCIUM SERPL-MCNC: 10.5 MG/DL
CALCIUM SERPL-MCNC: 10.5 MG/DL
CHLORIDE SERPL-SCNC: 106 MMOL/L
CHOLEST SERPL-MCNC: 144 MG/DL
CO2 SERPL-SCNC: 22 MMOL/L
CREAT SERPL-MCNC: 0.89 MG/DL
EGFR: 103 ML/MIN/1.73M2
GLUCOSE SERPL-MCNC: 123 MG/DL
HCT VFR BLD CALC: 37.6 %
HDLC SERPL-MCNC: 52 MG/DL
HGB BLD-MCNC: 11.3 G/DL
LDH SERPL-CCNC: 192 U/L
LDLC SERPL CALC-MCNC: 66 MG/DL
MAGNESIUM SERPL-MCNC: 1.9 MG/DL
MCHC RBC-ENTMCNC: 27 PG
MCHC RBC-ENTMCNC: 30.1 GM/DL
MCV RBC AUTO: 90 FL
NONHDLC SERPL-MCNC: 92 MG/DL
PARATHYROID HORMONE INTACT: 152 PG/ML
PHOSPHATE SERPL-MCNC: 2.3 MG/DL
PLATELET # BLD AUTO: 153 K/UL
POTASSIUM SERPL-SCNC: 4.6 MMOL/L
PROT SERPL-MCNC: 5.9 G/DL
RBC # BLD: 4.18 M/UL
RBC # FLD: 14.4 %
SODIUM SERPL-SCNC: 141 MMOL/L
TACROLIMUS SERPL-MCNC: 10 NG/ML
TRIGL SERPL-MCNC: 155 MG/DL
URATE SERPL-MCNC: 5.9 MG/DL
WBC # FLD AUTO: 3.48 K/UL

## 2024-01-10 PROCEDURE — 99214 OFFICE O/P EST MOD 30 MIN: CPT

## 2024-01-10 NOTE — ASSESSMENT
[FreeTextEntry1] : Renal Transplant recipient: Noted allograft function, creatinine at discharge, martin creatinine. Reviewed for urinary symptoms/fever/chills/pain/new symptoms. Tolerating medications. Lab data reviewed including allograft function, urinalysis, any viremia and trough level of medication as well as any imaging reports. Immunosuppression: reviewed; Envarsus 2, cellcept 500 mg/dose pred 10/d Noted induction regimen, maintenance regimen and reviewed target trough level. Foot ulcer: Healing right; Left - referred for wound care on podiatry f/u. Also has wound care referral and Vascular f/u.  Leukopenia resolved, back on Bactrim and Valcyte. Completing 6 months soon. DM: Current regimen reviewed. Optimal target glucose levels reviewed for fasting and post prandial measurements. Will continue to monitor and adjust treatment; reviewed lifestyle modifications for glycemia control. Hyperlipidemia: Reviewed medication regimen/lifestyle modification for maintaining target lipid levels. Cardiovascular risk reduction, primary/secondary prevention measures were discussed as appropriate.   Prophylaxis: Reviewed antimicrobial and GI prophylaxis as well as precautions to prevent infections. Advised to bring flow sheets and medication list at every visit. Discussed ophthalmology and dermatology checks at least yearly and vaccinations. Flu vaccine yearly and Pneumonia vaccine every 5 years. Had flu vaccine. Copy of office visit and lab reports are being sent to primary physician and referring nephrologist.

## 2024-01-10 NOTE — PHYSICAL EXAM
[General Appearance - Alert] : alert [General Appearance - In No Acute Distress] : in no acute distress [Sclera] : the sclera and conjunctiva were normal [Outer Ear] : the ears and nose were normal in appearance [Jugular Venous Distention Increased] : there was no jugular-venous distention [Heart Sounds Gallop] : no gallops [Heart Sounds Pericardial Friction Rub] : no pericardial rub [Cervical Lymph Nodes Enlarged Posterior Bilaterally] : posterior cervical [Cervical Lymph Nodes Enlarged Anterior Bilaterally] : anterior cervical [Involuntary Movements] : no involuntary movements were seen [] : no rash [FreeTextEntry1] : A, OX3 [Oriented To Time, Place, And Person] : oriented to person, place, and time [Impaired Insight] : insight and judgment were intact

## 2024-01-10 NOTE — HISTORY OF PRESENT ILLNESS
[FreeTextEntry1] : He received DDRT 7/16/23, Thymo induction. DGF, Recovered. 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 His ureter stent was removed on 9/5/23. Labs done today. He is on Eliquis. He is on Eliquis for left arm DVT. He has left foot dorsum of big toe wound, healing, was on antibiotic. Has Podiatry follow up. He  had podiatry visit for right 3rd toe wound, healed. Has dressing on left. He follows with Dr. Myers. He had left UE AVF closed  on 9/8 at [facility]. he follows with Dr. Jose Braga for heart disease care. He has AICD. PMD: Dr. Romo. Home glucose controlled.   Has left foot ulcer on podiatry follow up. Has wound care referral. No SOB. Home blood pressure is controlled.   No fever. No hematuria      Medication list updated. Hospital Course:  Discharge Date 16-Dec-2023  Admission Date 06-Dec-2023 13:01  Reason for Admission c/f osteomyelitis  Hospital Course   53M with PMH of DMII on insulin, CAD, CHF, s/p CABG 2015, AICD (2016), PVD, is  s/p 4 stents in R leg , right big toe and second toe amputation 2021, ESRD on  HD for 6 years. S/p DDRT on 7/14/23, initial DGF needing HD until 8/7/23. H/o  ABMR 8/2023 with 6000 class II DSA treated with steroids, plex/ivig and  ritux. Recovered graft function to baseline creatinine of 1.3.  He is admitted with osteomyelitis, on IV imipenem/cilastatin.  He underwent Right foot partial 3rd ray resection and left foot wound  debridement on 12/11.  After discussion b/n podiatry and vascular angiogram deferred at this time as  there is adequate flow and good healing.  PICC line placed 12/14 for continuation of abx on discharge.       Discharge Medications  aspirin 81 mg oral delayed release tablet: 1 tab(s)  orally once a day  atorvastatin 40 mg oral tablet: 1 tab(s) orally once a day (at bedtime)  carvedilol 3.125 mg oral tablet: 1 tab(s) orally every 12 hours  Eliquis 5 mg oral tablet: 1 tab(s) orally 2 times a day  HumaLOG KwikPen 100 units/mL injectable solution: 8-10 unit(s) injectable 3 times  a day (before meals)  imipenem-cilastatin 500 mg-500 mg intravenous injection: 500 milligram(s)  intravenously every 6 hours  insulin glargine 100 units/mL subcutaneous solution: 21 unit(s) subcutaneous  once a day (at bedtime)  mycophenolate mofetil 250 mg oral capsule: 500 milligram(s) orally 2 times a  day  Pepcid 20 mg oral tablet: 1 tab(s) orally once a day  prednisone 10 mg oral tablet: 1 tab(s) orally once a day  sulfamethoxazole-trimethoprim 400 mg-80 mg oral tablet: 1 tab(s) orally once a  day  tacrolimus 1 mg oral tablet, extended release: 2 tab(s) orally once a day

## 2024-01-11 LAB
CMV DNA SPEC QL NAA+PROBE: NOT DETECTED IU/ML
CMVPCR LOG: NOT DETECTED LOG10IU/ML
ESTIMATED AVERAGE GLUCOSE: 131 MG/DL
HBA1C MFR BLD HPLC: 6.2 %

## 2024-01-12 ENCOUNTER — APPOINTMENT (OUTPATIENT)
Dept: WOUND CARE | Facility: HOSPITAL | Age: 54
End: 2024-01-12
Payer: MEDICARE

## 2024-01-12 ENCOUNTER — OUTPATIENT (OUTPATIENT)
Dept: OUTPATIENT SERVICES | Facility: HOSPITAL | Age: 54
LOS: 1 days | End: 2024-01-12
Payer: MEDICARE

## 2024-01-12 DIAGNOSIS — Z95.1 PRESENCE OF AORTOCORONARY BYPASS GRAFT: Chronic | ICD-10-CM

## 2024-01-12 DIAGNOSIS — Z89.422 ACQUIRED ABSENCE OF OTHER LEFT TOE(S): Chronic | ICD-10-CM

## 2024-01-12 DIAGNOSIS — Z95.810 PRESENCE OF AUTOMATIC (IMPLANTABLE) CARDIAC DEFIBRILLATOR: Chronic | ICD-10-CM

## 2024-01-12 PROCEDURE — G0463: CPT

## 2024-01-12 PROCEDURE — 99215 OFFICE O/P EST HI 40 MIN: CPT

## 2024-01-12 NOTE — REVIEW OF SYSTEMS
[Recent Weight Loss (___ Lbs)] : recent [unfilled] ~Ulb weight loss [Nosebleeds] : nosebleeds [Lower Ext Edema] : lower extremity edema [Difficulty Walking] : difficulty walking [Easy Bruising] : a tendency for easy bruising [Negative] : Psychiatric [FreeTextEntry3] : wears magnifying glasses. [FreeTextEntry5] : AICD [de-identified] : Left foot wound

## 2024-01-12 NOTE — HISTORY OF PRESENT ILLNESS
[FreeTextEntry1] : Mr. ROSA CONDE   presents to the office with a wound for 6 weeks duration.  The wound is located on left foot. the The patient has complaints of non healing wound.  The patient has been dressing the wound with betadine and gauze.  The patient denies fevers or chills.  The patient has localized pain to the wound upon dressing changes.  The patient has no other complaints or associated symptoms.  HbA1c is 6.2% as of Jan 2024.

## 2024-01-12 NOTE — PHYSICAL EXAM
[Skin Ulcer] : ulcer [Alert] : alert [Oriented to Person] : oriented to person [Oriented to Place] : oriented to place [Oriented to Time] : oriented to time [Calm] : calm [de-identified] : nad [de-identified] : non traumatic [de-identified] : supple [de-identified] : equal chest rise and fall [de-identified] : AICD [de-identified] : soft [de-identified] : FROM [de-identified] : Left foot ulcer [FreeTextEntry1] : Patient was evaluated for hyperbaric treatment and is deemed a candidate for HBO therapy. Patient's H and P was reviewed, and patient was given an orientation of the HBO suite and treatment schedule. Patient was educated on the risks and the benefits of the treatment. Patient read and signed consent prior to the initiation of any screening procedure. Patient had the opportunity to discuss any questions regarding HBO therapy. Writer witnessed the signing of the consent, and the patient was given a copy of the signed consent. Patient Chest Xray is in the chart and MRI

## 2024-01-17 LAB — BKV DNA SPEC QL NAA+PROBE: NOT DETECTED IU/ML

## 2024-01-18 DIAGNOSIS — L97.509 NON-PRESSURE CHRONIC ULCER OF OTHER PART OF UNSPECIFIED FOOT WITH UNSPECIFIED SEVERITY: ICD-10-CM

## 2024-01-18 DIAGNOSIS — M86.9 OSTEOMYELITIS, UNSPECIFIED: ICD-10-CM

## 2024-01-18 DIAGNOSIS — E11.621 TYPE 2 DIABETES MELLITUS WITH FOOT ULCER: ICD-10-CM

## 2024-01-22 ENCOUNTER — APPOINTMENT (OUTPATIENT)
Dept: HYPERBARIC MEDICINE | Facility: CLINIC | Age: 54
End: 2024-01-22
Payer: MEDICARE

## 2024-01-22 ENCOUNTER — OUTPATIENT (OUTPATIENT)
Dept: OUTPATIENT SERVICES | Facility: HOSPITAL | Age: 54
LOS: 1 days | End: 2024-01-22
Payer: MEDICARE

## 2024-01-22 VITALS
HEART RATE: 83 BPM | OXYGEN SATURATION: 100 % | SYSTOLIC BLOOD PRESSURE: 104 MMHG | TEMPERATURE: 97.5 F | RESPIRATION RATE: 16 BRPM | DIASTOLIC BLOOD PRESSURE: 61 MMHG

## 2024-01-22 VITALS
DIASTOLIC BLOOD PRESSURE: 80 MMHG | OXYGEN SATURATION: 100 % | RESPIRATION RATE: 16 BRPM | SYSTOLIC BLOOD PRESSURE: 141 MMHG | HEART RATE: 74 BPM

## 2024-01-22 DIAGNOSIS — Z89.422 ACQUIRED ABSENCE OF OTHER LEFT TOE(S): Chronic | ICD-10-CM

## 2024-01-22 DIAGNOSIS — Z95.1 PRESENCE OF AORTOCORONARY BYPASS GRAFT: Chronic | ICD-10-CM

## 2024-01-22 DIAGNOSIS — Z95.810 PRESENCE OF AUTOMATIC (IMPLANTABLE) CARDIAC DEFIBRILLATOR: Chronic | ICD-10-CM

## 2024-01-22 PROCEDURE — 99183 HYPERBARIC OXYGEN THERAPY: CPT

## 2024-01-22 PROCEDURE — G0277: CPT

## 2024-01-22 PROCEDURE — 82962 GLUCOSE BLOOD TEST: CPT

## 2024-01-22 RX ORDER — PREDNISONE 5 MG/1
5 TABLET ORAL DAILY
Qty: 60 | Refills: 11 | Status: ACTIVE | COMMUNITY
Start: 2023-07-17 | End: 1900-01-01

## 2024-01-22 NOTE — PROCEDURE
[Outpatient] : Outpatient [Ambulatory] : Patient is ambulatory. [Cane] : cane [THIS CHAMBER HAS BEEN CLEANED / DISINFECTED] : This chamber has been cleaned / disinfected according to local and hospital policy and procedure prior to this treatment. [I have examined and evaluated this patient today, including ears and lungs and have cleared the patient] : I have examined and evaluated this patient today, including ears and lungs and have cleared the patient to continue HBOT as prescribed.  [Time MD/Provider assessed Patient: _____] : Time MD/Provider assessed Patient: [unfilled] [I have ordered 1 HBOT session at the indicated protocol as seen below] : I have ordered 1 HBOT session at the indicated protocol as seen below [Patient educated on the risks of SMOKING prior to HBOT with understanding] : Patient educated on the risks of SMOKING prior to HBOT with understanding [Patient demonstrated and verbalized proper technique for using air break mask] : Patient demonstrated and verbalized proper technique for using air break mask [Patient educated on the risks of CONSUMING ALCOHOL prior to HBOT with understanding] : Patient educated on the risks of CONSUMING ALCOHOL prior to HBOT with understanding [100% Cotton] : 100% cotton [Empty all pockets] : empty all pockets [No hair oils, wigs, hairpieces, pins] : no hair oils, wigs, hairpieces, pins  [Pre tx medications] : pre tx medications  [No make-up, creams] : no make-up, creams  [No jewelry] : no jewelry  [No matches, cigarettes, lighters] : no matches, cigarettes, lighters  [Hearing aid removed] : hearing aid removed [Dentures removed] : dentures removed [Ground bracelet on pt's wrist] : ground bracelet on pt's wrist  [Contacts removed] : contacts removed  [Remove nail polish] : remove nail polish  [No reading material] : no reading material  [Bra, undergarments removed] : bra, undergarments removed  [No contraindicated dressings] : no contraindicated dressings [Ground Wire - VISUAL Verification - Intact/Free of Obstruction] : Ground Wire - VISUAL Verification - Intact/Free of Obstruction  [Ground Continuity - Verified < 1 ohm w/ Wrist Strap Moises] : Ground Continuity - Verified < 1 ohm w/ Wrist Strap Moises [Number: ___] : Number: [unfilled] [Diagnosis: ___] : Diagnosis: [unfilled] [____] : Post-Dive: Time - [unfilled] [___] : Post-Dive: Value - [unfilled] mg/dL [Clear all fields] : clear all fields [] : No [FreeTextEntry1] : 2.0 jevon [FreeTextEntry3] : 90 min [FreeTextEntry5] : 1216 [[facility]Entry[facility]] : 8696 [FreeTextEntry9] : 0479 [de-identified] : 1400 [de-identified] : 110 min [de-identified] : SAEID RILEY

## 2024-01-22 NOTE — ADDENDUM
[FreeTextEntry1] : Patient was given brief introduction to HBOT and daily routine, and how to minimize risk of barotrauma by demonstrating different techniques by HBT. Patient's TM evaluated by NP prior to tx.  Patient descended to 2.0 DANAE @ 1.5 PSI/min without incident in chamber #4. Patient is resting @ depth with equal chest rise and observed through out whole treatment. Patient ascended from 2.0 DANAE @ 1.5 PSI/min without incident. Patient tolerated treatment well.   Pt provided with apple juice and alyse crackers to raise bgl. Explained to pt to eat a meal prior to HBO treatment.

## 2024-01-22 NOTE — ASSESSMENT
[No change from previous assessment] : No change from previous assessment [Time MD/Provider assessed Patient:_______] : Time MD/Provider assessed Patient: [unfilled] [Patient prepared for dive] : Patient prepared for dive [Patient undergoing HBO treatment for __________] : Patient undergoing HBO treatment for [unfilled] [Patient descended without problem for 9 minutes] : Patient descended without problem for 9 minutes [No dizziness or thirst] :  No dizziness or thirst [No ear problems] : No ear problems [Vital signs stable] : Vital signs stable [Tolerating dive well] : Tolerating dive well [No Chest Pain, shortness of breath] : No Chest Pain, shortness of breath [Respiratory Rate Stable] : Respiratory Rate Stable [Yes] : Yes [No chest pain, shortness of breath, or ear pain] :  No chest pain, shortness of breath, or ear pain  [Tolerated Ascent well] : Tolerated Ascent well [Vital Signs stable] : Vital Signs stable [A physician was present throughout the entire HBOT] : A physician was present throughout the entire HBOT [Clinically Stable] : Clinically stable [Continue Treatment Plan] : Continue treatment plan [FreeTextEntry1] : Cleared to dive by SAEID RILEY

## 2024-01-23 ENCOUNTER — OUTPATIENT (OUTPATIENT)
Dept: OUTPATIENT SERVICES | Facility: HOSPITAL | Age: 54
LOS: 1 days | End: 2024-01-23
Payer: MEDICARE

## 2024-01-23 ENCOUNTER — APPOINTMENT (OUTPATIENT)
Dept: HYPERBARIC MEDICINE | Facility: CLINIC | Age: 54
End: 2024-01-23
Payer: MEDICARE

## 2024-01-23 VITALS
OXYGEN SATURATION: 100 % | SYSTOLIC BLOOD PRESSURE: 134 MMHG | HEART RATE: 99 BPM | TEMPERATURE: 97.3 F | RESPIRATION RATE: 16 BRPM | DIASTOLIC BLOOD PRESSURE: 83 MMHG

## 2024-01-23 VITALS
HEART RATE: 75 BPM | RESPIRATION RATE: 16 BRPM | DIASTOLIC BLOOD PRESSURE: 83 MMHG | OXYGEN SATURATION: 100 % | SYSTOLIC BLOOD PRESSURE: 141 MMHG

## 2024-01-23 DIAGNOSIS — M79.606 PAIN IN LEG, UNSPECIFIED: ICD-10-CM

## 2024-01-23 DIAGNOSIS — Z95.1 PRESENCE OF AORTOCORONARY BYPASS GRAFT: Chronic | ICD-10-CM

## 2024-01-23 DIAGNOSIS — Z95.810 PRESENCE OF AUTOMATIC (IMPLANTABLE) CARDIAC DEFIBRILLATOR: Chronic | ICD-10-CM

## 2024-01-23 PROCEDURE — 82962 GLUCOSE BLOOD TEST: CPT

## 2024-01-23 PROCEDURE — 99183 HYPERBARIC OXYGEN THERAPY: CPT

## 2024-01-23 PROCEDURE — G0277: CPT

## 2024-01-23 NOTE — ADDENDUM
[FreeTextEntry1] : Pt descended to 2.0 DANAE @ 1.5 PSI/min without incident in chamber #3 Pt resting @ depth with chest rise and fall observed throughout tx.  Pt ascended from 2.0 DANAE @ 1.5 PSI/min without incident.  Pt tolerated tx well.

## 2024-01-23 NOTE — ASSESSMENT
[No change from previous assessment] : No change from previous assessment [Patient prepared for dive] : Patient prepared for dive [Patient undergoing HBO treatment for __________] : Patient undergoing HBO treatment for [unfilled] [No dizziness or thirst] :  No dizziness or thirst [Patient descended without problem for 9 minutes] : Patient descended without problem for 9 minutes [No ear problems] : No ear problems [Vital signs stable] : Vital signs stable [Tolerating dive well] : Tolerating dive well [No Chest Pain, shortness of breath] : No Chest Pain, shortness of breath [Respiratory Rate Stable] : Respiratory Rate Stable [No chest pain, shortness of breath, or ear pain] :  No chest pain, shortness of breath, or ear pain  [Tolerated Ascent well] : Tolerated Ascent well [Vital Signs stable] : Vital Signs stable [A physician was present throughout the entire HBOT] : A physician was present throughout the entire HBOT [Yes] : Yes [Clinically Stable] : Clinically stable [Continue Treatment Plan] : Continue treatment plan

## 2024-01-23 NOTE — PROCEDURE
[Outpatient] : Outpatient [Ambulatory] : Patient is ambulatory. [Cane] : cane [THIS CHAMBER HAS BEEN CLEANED / DISINFECTED] : This chamber has been cleaned / disinfected according to local and hospital policy and procedure prior to this treatment. [I have examined and evaluated this patient today, including ears and lungs and have cleared the patient] : I have examined and evaluated this patient today, including ears and lungs and have cleared the patient to continue HBOT as prescribed.  [I have ordered 1 HBOT session at the indicated protocol as seen below] : I have ordered 1 HBOT session at the indicated protocol as seen below [Patient demonstrated and verbalized proper technique for using air break mask] : Patient demonstrated and verbalized proper technique for using air break mask [Patient educated on the risks of SMOKING prior to HBOT with understanding] : Patient educated on the risks of SMOKING prior to HBOT with understanding [Patient educated on the risks of CONSUMING ALCOHOL prior to HBOT with understanding] : Patient educated on the risks of CONSUMING ALCOHOL prior to HBOT with understanding [100% Cotton] : 100% cotton [Empty all pockets] : empty all pockets [No hair oils, wigs, hairpieces, pins] : no hair oils, wigs, hairpieces, pins  [No make-up, creams] : no make-up, creams  [Pre tx medications] : pre tx medications  [No jewelry] : no jewelry  [Hearing aid removed] : hearing aid removed [No matches, cigarettes, lighters] : no matches, cigarettes, lighters  [Ground bracelet on pt's wrist] : ground bracelet on pt's wrist  [Dentures removed] : dentures removed [Contacts removed] : contacts removed  [Remove nail polish] : remove nail polish  [No reading material] : no reading material  [Bra, undergarments removed] : bra, undergarments removed  [No contraindicated dressings] : no contraindicated dressings [Ground Continuity - Verified < 1 ohm w/ Wrist Strap Moises] : Ground Continuity - Verified < 1 ohm w/ Wrist Strap Moises [Ground Wire - VISUAL Verification - Intact/Free of Obstruction] : Ground Wire - VISUAL Verification - Intact/Free of Obstruction  [Number: ___] : Number: [unfilled] [Diagnosis: ___] : Diagnosis: [unfilled] [____] : Post-Dive: Time - [unfilled] [___] : Post-Dive: Value - [unfilled] mg/dL [Clear all fields] : clear all fields [] : No [FreeTextEntry1] : 2.0 jevon [FreeTextEntry5] : 0812 [FreeTextEntry3] : 90 min [FreeTextEntry7] : 1200 [FreeTextEntry9] : 3140 [de-identified] : 7347 [de-identified] : 110 min

## 2024-01-24 ENCOUNTER — OUTPATIENT (OUTPATIENT)
Dept: OUTPATIENT SERVICES | Facility: HOSPITAL | Age: 54
LOS: 1 days | End: 2024-01-24
Payer: MEDICARE

## 2024-01-24 ENCOUNTER — APPOINTMENT (OUTPATIENT)
Dept: HYPERBARIC MEDICINE | Facility: CLINIC | Age: 54
End: 2024-01-24
Payer: MEDICARE

## 2024-01-24 VITALS
DIASTOLIC BLOOD PRESSURE: 69 MMHG | OXYGEN SATURATION: 98 % | SYSTOLIC BLOOD PRESSURE: 111 MMHG | TEMPERATURE: 97.5 F | HEART RATE: 86 BPM | RESPIRATION RATE: 16 BRPM

## 2024-01-24 VITALS
OXYGEN SATURATION: 98 % | SYSTOLIC BLOOD PRESSURE: 168 MMHG | DIASTOLIC BLOOD PRESSURE: 62 MMHG | RESPIRATION RATE: 16 BRPM | HEART RATE: 61 BPM

## 2024-01-24 DIAGNOSIS — M79.606 PAIN IN LEG, UNSPECIFIED: ICD-10-CM

## 2024-01-24 DIAGNOSIS — Z95.1 PRESENCE OF AORTOCORONARY BYPASS GRAFT: Chronic | ICD-10-CM

## 2024-01-24 DIAGNOSIS — E11.621 TYPE 2 DIABETES MELLITUS WITH FOOT ULCER: ICD-10-CM

## 2024-01-24 DIAGNOSIS — L97.522 NON-PRESSURE CHRONIC ULCER OF OTHER PART OF LEFT FOOT WITH FAT LAYER EXPOSED: ICD-10-CM

## 2024-01-24 DIAGNOSIS — Z89.422 ACQUIRED ABSENCE OF OTHER LEFT TOE(S): Chronic | ICD-10-CM

## 2024-01-24 DIAGNOSIS — Z95.810 PRESENCE OF AUTOMATIC (IMPLANTABLE) CARDIAC DEFIBRILLATOR: Chronic | ICD-10-CM

## 2024-01-24 DIAGNOSIS — M86.331 CHRONIC MULTIFOCAL OSTEOMYELITIS, RIGHT RADIUS AND ULNA: ICD-10-CM

## 2024-01-24 PROCEDURE — G0277: CPT

## 2024-01-24 PROCEDURE — 82962 GLUCOSE BLOOD TEST: CPT

## 2024-01-24 PROCEDURE — 99183 HYPERBARIC OXYGEN THERAPY: CPT

## 2024-01-24 NOTE — PROGRESS NOTE ADULT - PROBLEM SELECTOR PROBLEM 4
Hyperphosphatemia
Hyperphosphatemia
Anemia
[FreeTextEntry1] : TODAY:  I had the pleasure of seeing Ian accompanied by his mother today at their home.  His previous history and physical findings have been reviewed.  Consent was obtained in order for encounter to be performed today using the Solo lashawn.  He is under our care for ADHD and ASD which are chronic conditions he is receiving continuing active treatment for.  He is currently managed on a regimen of Strattera 18 mg daily to help with focus and staying on task but both mom and patient do not see any improvement.  He is currently in 6th grade so mom is not really getting much feed back from teachers but states when he is home on the weekends she notices no difference.  He has previously failed trials of Adderall, Focalin, and methylphenidate and we will discuss adjusting his Strattera as he is experiencing no adverse side effects.  Mom also lets me know that Ian will be undergoing the intake appointment for a psychiatrist in order for them to treat him which is scheduled for next week and will let me know the outcome.  
Hyperphosphatemia
Hyperphosphatemia
Anemia
Hyperphosphatemia

## 2024-01-24 NOTE — PROCEDURE
[Outpatient] : Outpatient [Ambulatory] : Patient is ambulatory. [Cane] : cane [THIS CHAMBER HAS BEEN CLEANED / DISINFECTED] : This chamber has been cleaned / disinfected according to local and hospital policy and procedure prior to this treatment. [____] : Post-Dive: Time - [unfilled] [___] : Post-Dive: Value - [unfilled] mg/dL [I have examined and evaluated this patient today, including ears and lungs and have cleared the patient] : I have examined and evaluated this patient today, including ears and lungs and have cleared the patient to continue HBOT as prescribed.  [I have ordered 1 HBOT session at the indicated protocol as seen below] : I have ordered 1 HBOT session at the indicated protocol as seen below [Patient demonstrated and verbalized proper technique for using air break mask] : Patient demonstrated and verbalized proper technique for using air break mask [Patient educated on the risks of SMOKING prior to HBOT with understanding] : Patient educated on the risks of SMOKING prior to HBOT with understanding [Patient educated on the risks of CONSUMING ALCOHOL prior to HBOT with understanding] : Patient educated on the risks of CONSUMING ALCOHOL prior to HBOT with understanding [100% Cotton] : 100% cotton [Empty all pockets] : empty all pockets [No hair oils, wigs, hairpieces, pins] : no hair oils, wigs, hairpieces, pins  [Pre tx medications] : pre tx medications  [No make-up, creams] : no make-up, creams  [No jewelry] : no jewelry  [No matches, cigarettes, lighters] : no matches, cigarettes, lighters  [Hearing aid removed] : hearing aid removed [Dentures removed] : dentures removed [Ground bracelet on pt's wrist] : ground bracelet on pt's wrist  [Contacts removed] : contacts removed  [Remove nail polish] : remove nail polish  [No reading material] : no reading material  [Bra, undergarments removed] : bra, undergarments removed  [No contraindicated dressings] : no contraindicated dressings [Ground Wire - VISUAL Verification - Intact/Free of Obstruction] : Ground Wire - VISUAL Verification - Intact/Free of Obstruction  [Ground Continuity - Verified < 1 ohm w/ Wrist Strap Moises] : Ground Continuity - Verified < 1 ohm w/ Wrist Strap Moises [Diagnosis: ___] : Diagnosis: [unfilled] [Number: ___] : Number: [unfilled] [Clear all fields] : clear all fields [] : No [FreeTextEntry1] : 2.0 jevon [FreeTextEntry3] : 90 min [FreeTextEntry5] : 1200 [FreeTextEntry7] : 1215 [FreeTextEntry9] : 3248 [de-identified] : 7244 [de-identified] : 110 min

## 2024-01-24 NOTE — ASSESSMENT
[No change from previous assessment] : No change from previous assessment [Patient prepared for dive] : Patient prepared for dive [Patient undergoing HBO treatment for __________] : Patient undergoing HBO treatment for [unfilled] [Patient descended without problem for 9 minutes] : Patient descended without problem for 9 minutes [No dizziness or thirst] :  No dizziness or thirst [Vital signs stable] : Vital signs stable [No ear problems] : No ear problems [Tolerating dive well] : Tolerating dive well [No Chest Pain, shortness of breath] : No Chest Pain, shortness of breath [Respiratory Rate Stable] : Respiratory Rate Stable [No chest pain, shortness of breath, or ear pain] :  No chest pain, shortness of breath, or ear pain  [Tolerated Ascent well] : Tolerated Ascent well [Vital Signs stable] : Vital Signs stable [A physician was present throughout the entire HBOT] : A physician was present throughout the entire HBOT [Yes] : Yes [Clinically Stable] : Clinically stable [Continue Treatment Plan] : Continue treatment plan

## 2024-01-25 ENCOUNTER — APPOINTMENT (OUTPATIENT)
Dept: HYPERBARIC MEDICINE | Facility: CLINIC | Age: 54
End: 2024-01-25
Payer: MEDICARE

## 2024-01-25 ENCOUNTER — OUTPATIENT (OUTPATIENT)
Dept: OUTPATIENT SERVICES | Facility: HOSPITAL | Age: 54
LOS: 1 days | End: 2024-01-25
Payer: MEDICARE

## 2024-01-25 ENCOUNTER — APPOINTMENT (OUTPATIENT)
Dept: ENDOCRINOLOGY | Facility: HOSPITAL | Age: 54
End: 2024-01-25

## 2024-01-25 VITALS
SYSTOLIC BLOOD PRESSURE: 113 MMHG | RESPIRATION RATE: 16 BRPM | DIASTOLIC BLOOD PRESSURE: 56 MMHG | OXYGEN SATURATION: 97 % | TEMPERATURE: 97.7 F | HEART RATE: 82 BPM

## 2024-01-25 VITALS
HEART RATE: 84 BPM | SYSTOLIC BLOOD PRESSURE: 153 MMHG | DIASTOLIC BLOOD PRESSURE: 56 MMHG | RESPIRATION RATE: 16 BRPM | OXYGEN SATURATION: 97 %

## 2024-01-25 DIAGNOSIS — E11.621 TYPE 2 DIABETES MELLITUS WITH FOOT ULCER: ICD-10-CM

## 2024-01-25 DIAGNOSIS — L97.522 NON-PRESSURE CHRONIC ULCER OF OTHER PART OF LEFT FOOT WITH FAT LAYER EXPOSED: ICD-10-CM

## 2024-01-25 DIAGNOSIS — M79.606 PAIN IN LEG, UNSPECIFIED: ICD-10-CM

## 2024-01-25 DIAGNOSIS — Z95.1 PRESENCE OF AORTOCORONARY BYPASS GRAFT: Chronic | ICD-10-CM

## 2024-01-25 DIAGNOSIS — M86.331 CHRONIC MULTIFOCAL OSTEOMYELITIS, RIGHT RADIUS AND ULNA: ICD-10-CM

## 2024-01-25 PROCEDURE — 82962 GLUCOSE BLOOD TEST: CPT

## 2024-01-25 PROCEDURE — 99183 HYPERBARIC OXYGEN THERAPY: CPT

## 2024-01-25 PROCEDURE — G0277: CPT

## 2024-01-25 NOTE — PROVIDER CONTACT NOTE (OTHER) - ASSESSMENT
Interval History and Physical    Reviewed H&P written by Dr. Nurys Ariza on 24. Updates/changes noted below.     Marcy Smith is a 36 year old  who is presenting today for suction dilation and curettage in the setting of missed  at 6w1d gestation.     Today she reports feeling overall well and ready for her procedure. Denies fever, chills, N/V/D, new/worsening pain.     She last ate yesterday evening, last drank some sips milk this morning at 0400.     Visit Vitals  BP 95/54   Pulse 63   Temp 96.6 °F (35.9 °C)   Resp 20   Ht 5' 6\" (1.676 m)   Wt 92.4 kg   LMP 2023 (Exact Date)   SpO2 100%   BMI 32.88 kg/m²        Gen: WDWN, no acute distress, BMI - 30-34.9 - Class 1 Obesity  HEENT: EOMI, no scleral icterus  CV: regular rate, pulses intact, well-perfused  Resp: No respiratory distress, speaking in complete sentences  Extrem: moves all extremities, no edema  Abd: soft, nontender     A/P  Marcy Smith is a 36 year old  who is presenting today for suction dilation and curettage in the setting of missed  at 6w1d gestation.     Informed consent obtained. Informed consent obtained. We discussed the risks of suction D&C: infection, bleeding, injury to cervix and uterus including uterine perforation, other surgical injuries, potential need for laparoscopic surgery in the event of uterine injury. We also discussed the potential need for blood transfusion--with its small risk of a blood borne virus, allergic reaction, transfusion reaction. She is amenable to the risks and wishes to proceed. Written consent obtained and placed in the chart.   Marcy Smith did consent to receiving blood products.     - admit to day surgery  - insert and maintain PIV  - labs: type and screen, CBC  - urine pregnancy test not indicated  - abx: 200g Doxycycline on call to OR  - planned disposition: to home post-procedure    Patient does desire genetic testing of products of conception. Will send POC for 
patient going for ultrasound, can patient get off tele order? patient has been NSR on room air, no events overnight. On heparin drip. Vitals stable.
pt [id number] to make needs known vs stable . no s/s of chest pain present
s/p dialysis via left avf 7/17 , patient states arm does get swollen from dialysis, but never to hand. hand and arm +3 edema. + bruit +thrill
chromosomal analysis.     This patient is under the care of the gynecology team. For any questions or concerns, please page the gyn team at [phone]-.     Bronwyn Gerber DO, PGY I  Obstetrics & Gynecology  01/25/24    Attending Note:     I have seen and evaluated the patient. I have reviewed and edited where needed, the above resident note, and agree with findings, assessment and plan.    Peggy Ariza, DO     
Pt A&Ox4, VS as charted. Pt c/o incisional pain, medicated with Tramadol 50mg. Pt walked to bathroom to attempt BM, urinated instead. LIANA drainage small, site c/d/i.
Pt increasingly agitated this morning. Attempted to throw walker at [facility] & placed hand on top of Birgit RN in attempt to move walker. Pt shouting at staff stating "I am going to lose it" and "get out of my face" when staff attempted to calm pt. Security called for situation diffusion to which the pt replied "If you're going to call the  on me then kick me out." Pt calm when security left room, however pt continued to yell at RN afterward stating "Why did you call the  on me, are you stupid? Pt ordered RN to leave room.
patient cbc could not be drawn for 6 hour post heparin gtt started order. Patient is currently undergoing HD. HD RN is able to sent PTT but states CBC will show inaccurate results while intradialysis. CBC has to be drawn at least an hour after dialysis states treatment should be done around 18:30.
patient refusing walker and at high risk for falls. Patient had event with overnight staff and it was escalated to security and administration. Upon speaking with patient for one hour patient states that this behavior is something he has shown in past and felt he was dealing with "stupid misunderstanding" staff. Patient has good rapport with current primary RN, but upon assisting him to the bathroom he is still refusing walker stating "I can handle it," and beginning to get frustrated again. Patient was limping the entire way there and is blaming his back on his difficulty with ambulating. He is fully alert and oriented and aware of his risk of falling and states he will not use the walker unless he feels he needs it. While in bathroom, patient was straining and also went ahead to take his telemetry monitor off. Educated patient on the risk of straining and vasal vagaling while on the toilet and patient taught back risks, requested for miralax dosing after refusing since 7/20/23.
patient pTT 25.5. On patient specific nomogram.
patient has not voided since granda removal. Prior to removal bladder scan showed 0cc in bladder. Patient was voiding very minimal output around 5-10cc per hour. Now is getting dialysis treatment.

## 2024-01-26 ENCOUNTER — OUTPATIENT (OUTPATIENT)
Dept: OUTPATIENT SERVICES | Facility: HOSPITAL | Age: 54
LOS: 1 days | End: 2024-01-26
Payer: MEDICARE

## 2024-01-26 ENCOUNTER — APPOINTMENT (OUTPATIENT)
Dept: HYPERBARIC MEDICINE | Facility: CLINIC | Age: 54
End: 2024-01-26
Payer: MEDICARE

## 2024-01-26 VITALS
HEART RATE: 75 BPM | DIASTOLIC BLOOD PRESSURE: 37 MMHG | TEMPERATURE: 98 F | OXYGEN SATURATION: 100 % | SYSTOLIC BLOOD PRESSURE: 102 MMHG | RESPIRATION RATE: 16 BRPM

## 2024-01-26 DIAGNOSIS — E11.621 TYPE 2 DIABETES MELLITUS WITH FOOT ULCER: ICD-10-CM

## 2024-01-26 DIAGNOSIS — Z89.422 ACQUIRED ABSENCE OF OTHER LEFT TOE(S): Chronic | ICD-10-CM

## 2024-01-26 DIAGNOSIS — Z95.810 PRESENCE OF AUTOMATIC (IMPLANTABLE) CARDIAC DEFIBRILLATOR: Chronic | ICD-10-CM

## 2024-01-26 DIAGNOSIS — M79.606 PAIN IN LEG, UNSPECIFIED: ICD-10-CM

## 2024-01-26 DIAGNOSIS — L97.522 NON-PRESSURE CHRONIC ULCER OF OTHER PART OF LEFT FOOT WITH FAT LAYER EXPOSED: ICD-10-CM

## 2024-01-26 DIAGNOSIS — M86.331 CHRONIC MULTIFOCAL OSTEOMYELITIS, RIGHT RADIUS AND ULNA: ICD-10-CM

## 2024-01-26 DIAGNOSIS — Z95.1 PRESENCE OF AORTOCORONARY BYPASS GRAFT: Chronic | ICD-10-CM

## 2024-01-26 PROCEDURE — 82962 GLUCOSE BLOOD TEST: CPT

## 2024-01-26 PROCEDURE — G0277: CPT

## 2024-01-26 PROCEDURE — 99183 HYPERBARIC OXYGEN THERAPY: CPT

## 2024-01-26 NOTE — PROCEDURE
[Outpatient] : Outpatient [Ambulatory] : Patient is ambulatory. [Cane] : cane [THIS CHAMBER HAS BEEN CLEANED / DISINFECTED] : This chamber has been cleaned / disinfected according to local and hospital policy and procedure prior to this treatment. [____] : Post-Dive: Time - [unfilled] [___] : Post-Dive: Value - [unfilled] mg/dL [I have examined and evaluated this patient today, including ears and lungs and have cleared the patient] : I have examined and evaluated this patient today, including ears and lungs and have cleared the patient to continue HBOT as prescribed.  [Time MD/Provider assessed Patient: _____] : Time MD/Provider assessed Patient: [unfilled] [I have ordered 1 HBOT session at the indicated protocol as seen below] : I have ordered 1 HBOT session at the indicated protocol as seen below [Patient demonstrated and verbalized proper technique for using air break mask] : Patient demonstrated and verbalized proper technique for using air break mask [Patient educated on the risks of SMOKING prior to HBOT with understanding] : Patient educated on the risks of SMOKING prior to HBOT with understanding [Patient educated on the risks of CONSUMING ALCOHOL prior to HBOT with understanding] : Patient educated on the risks of CONSUMING ALCOHOL prior to HBOT with understanding [100% Cotton] : 100% cotton [Empty all pockets] : empty all pockets [No hair oils, wigs, hairpieces, pins] : no hair oils, wigs, hairpieces, pins  [Pre tx medications] : pre tx medications  [No make-up, creams] : no make-up, creams  [No jewelry] : no jewelry  [Hearing aid removed] : hearing aid removed [No matches, cigarettes, lighters] : no matches, cigarettes, lighters  [Dentures removed] : dentures removed [Ground bracelet on pt's wrist] : ground bracelet on pt's wrist  [Contacts removed] : contacts removed  [Remove nail polish] : remove nail polish  [No reading material] : no reading material  [Bra, undergarments removed] : bra, undergarments removed  [No contraindicated dressings] : no contraindicated dressings [Ground Wire - VISUAL Verification - Intact/Free of Obstruction] : Ground Wire - VISUAL Verification - Intact/Free of Obstruction  [Ground Continuity - Verified < 1 ohm w/ Wrist Strap Moises] : Ground Continuity - Verified < 1 ohm w/ Wrist Strap Moises [Clear all fields] : clear all fields [Number: ___] : Number: [unfilled] [Diagnosis: ___] : Diagnosis: [unfilled] [] : No [FreeTextEntry3] : 90 [FreeTextEntry1] : 2.0 [FreeTextEntry5] : 100 [FreeTextEntry7] : 1018 [FreeTextEntry9] : 1200 [de-identified] : 7094 [de-identified] : 110

## 2024-01-26 NOTE — ADDENDUM
[FreeTextEntry1] : Pt descended to 2.0 DANAE @ 1.5 PSI/min without incident in chamber #4 Pt resting @ depth with chest rise and fall observed throughout tx. Pt ascended from 2.0 DANAE @ 1.5 PSI/min without incident. Pt tolerated tx well.

## 2024-01-26 NOTE — ASSESSMENT
[Time MD/Provider assessed Patient:_______] : Time MD/Provider assessed Patient: [unfilled] [Patient prepared for dive] : Patient prepared for dive [Patient undergoing HBO treatment for __________] : Patient undergoing HBO treatment for [unfilled] [Patient descended without problem for 9 minutes] : Patient descended without problem for 9 minutes [No dizziness or thirst] :  No dizziness or thirst [No ear problems] : No ear problems [Tolerating dive well] : Tolerating dive well [Vital signs stable] : Vital signs stable [No Chest Pain, shortness of breath] : No Chest Pain, shortness of breath [Respiratory Rate Stable] : Respiratory Rate Stable [No chest pain, shortness of breath, or ear pain] :  No chest pain, shortness of breath, or ear pain  [Tolerated Ascent well] : Tolerated Ascent well [A physician was present throughout the entire HBOT] : A physician was present throughout the entire HBOT [Vital Signs stable] : Vital Signs stable [No] : No [Continue Treatment Plan] : Continue treatment plan [Clinically Stable] : Clinically stable [0] : 0 out of 10 [FreeTextEntry1] : /53 T98.0 HR 75

## 2024-01-29 ENCOUNTER — OUTPATIENT (OUTPATIENT)
Dept: OUTPATIENT SERVICES | Facility: HOSPITAL | Age: 54
LOS: 1 days | End: 2024-01-29
Payer: MEDICARE

## 2024-01-29 ENCOUNTER — APPOINTMENT (OUTPATIENT)
Dept: HYPERBARIC MEDICINE | Facility: CLINIC | Age: 54
End: 2024-01-29
Payer: MEDICARE

## 2024-01-29 VITALS
TEMPERATURE: 98.2 F | DIASTOLIC BLOOD PRESSURE: 70 MMHG | RESPIRATION RATE: 16 BRPM | SYSTOLIC BLOOD PRESSURE: 138 MMHG | HEART RATE: 81 BPM

## 2024-01-29 DIAGNOSIS — Z95.810 PRESENCE OF AUTOMATIC (IMPLANTABLE) CARDIAC DEFIBRILLATOR: Chronic | ICD-10-CM

## 2024-01-29 DIAGNOSIS — Z89.422 ACQUIRED ABSENCE OF OTHER LEFT TOE(S): Chronic | ICD-10-CM

## 2024-01-29 DIAGNOSIS — M79.606 PAIN IN LEG, UNSPECIFIED: ICD-10-CM

## 2024-01-29 PROCEDURE — 99183 HYPERBARIC OXYGEN THERAPY: CPT

## 2024-01-29 PROCEDURE — 82962 GLUCOSE BLOOD TEST: CPT

## 2024-01-29 PROCEDURE — G0277: CPT

## 2024-01-29 NOTE — ASSESSMENT
[Time MD/Provider assessed Patient:_______] : Time MD/Provider assessed Patient: [unfilled] [Patient prepared for dive] : Patient prepared for dive [Patient undergoing HBO treatment for __________] : Patient undergoing HBO treatment for [unfilled] [Patient descended without problem for 9 minutes] : Patient descended without problem for 9 minutes [No dizziness or thirst] :  No dizziness or thirst [No ear problems] : No ear problems [Vital signs stable] : Vital signs stable [Tolerating dive well] : Tolerating dive well [No Chest Pain, shortness of breath] : No Chest Pain, shortness of breath [Respiratory Rate Stable] : Respiratory Rate Stable [No chest pain, shortness of breath, or ear pain] :  No chest pain, shortness of breath, or ear pain  [Tolerated Ascent well] : Tolerated Ascent well [Vital Signs stable] : Vital Signs stable [A physician was present throughout the entire HBOT] : A physician was present throughout the entire HBOT [No] : No [Clinically Stable] : Clinically stable [Continue Treatment Plan] : Continue treatment plan [0] : 0 out of 10 [FreeTextEntry1] : /53 T98.0 HR 75

## 2024-01-29 NOTE — PROCEDURE
[Outpatient] : Outpatient [Ambulatory] : Patient is ambulatory. [Cane] : cane [THIS CHAMBER HAS BEEN CLEANED / DISINFECTED] : This chamber has been cleaned / disinfected according to local and hospital policy and procedure prior to this treatment. [____] : Post-Dive: Time - [unfilled] [___] : Post-Dive: Value - [unfilled] mg/dL [I have examined and evaluated this patient today, including ears and lungs and have cleared the patient] : I have examined and evaluated this patient today, including ears and lungs and have cleared the patient to continue HBOT as prescribed.  [Time MD/Provider assessed Patient: _____] : Time MD/Provider assessed Patient: [unfilled] [I have ordered 1 HBOT session at the indicated protocol as seen below] : I have ordered 1 HBOT session at the indicated protocol as seen below [Patient demonstrated and verbalized proper technique for using air break mask] : Patient demonstrated and verbalized proper technique for using air break mask [Patient educated on the risks of SMOKING prior to HBOT with understanding] : Patient educated on the risks of SMOKING prior to HBOT with understanding [Patient educated on the risks of CONSUMING ALCOHOL prior to HBOT with understanding] : Patient educated on the risks of CONSUMING ALCOHOL prior to HBOT with understanding [100% Cotton] : 100% cotton [Empty all pockets] : empty all pockets [No hair oils, wigs, hairpieces, pins] : no hair oils, wigs, hairpieces, pins  [Pre tx medications] : pre tx medications  [No make-up, creams] : no make-up, creams  [No jewelry] : no jewelry  [No matches, cigarettes, lighters] : no matches, cigarettes, lighters  [Hearing aid removed] : hearing aid removed [Dentures removed] : dentures removed [Ground bracelet on pt's wrist] : ground bracelet on pt's wrist  [Contacts removed] : contacts removed  [Remove nail polish] : remove nail polish  [No reading material] : no reading material  [Bra, undergarments removed] : bra, undergarments removed  [No contraindicated dressings] : no contraindicated dressings [Ground Wire - VISUAL Verification - Intact/Free of Obstruction] : Ground Wire - VISUAL Verification - Intact/Free of Obstruction  [Ground Continuity - Verified < 1 ohm w/ Wrist Strap Moises] : Ground Continuity - Verified < 1 ohm w/ Wrist Strap Moises [Clear all fields] : clear all fields [Number: ___] : Number: [unfilled] [Diagnosis: ___] : Diagnosis: [unfilled] [] : No [FreeTextEntry1] : 2.0 [FreeTextEntry3] : 90 [FreeTextEntry5] : 1005 [FreeTextEntry7] : 101 [FreeTextEntry9] : 1206 [de-identified] : [id number] [de-identified] : 110

## 2024-01-30 ENCOUNTER — OUTPATIENT (OUTPATIENT)
Dept: OUTPATIENT SERVICES | Facility: HOSPITAL | Age: 54
LOS: 1 days | End: 2024-01-30
Payer: MEDICARE

## 2024-01-30 ENCOUNTER — APPOINTMENT (OUTPATIENT)
Dept: HYPERBARIC MEDICINE | Facility: CLINIC | Age: 54
End: 2024-01-30
Payer: MEDICARE

## 2024-01-30 VITALS
SYSTOLIC BLOOD PRESSURE: 161 MMHG | HEART RATE: 77 BPM | RESPIRATION RATE: 18 BRPM | DIASTOLIC BLOOD PRESSURE: 81 MMHG | OXYGEN SATURATION: 100 % | TEMPERATURE: 96.8 F

## 2024-01-30 DIAGNOSIS — Z89.422 ACQUIRED ABSENCE OF OTHER LEFT TOE(S): Chronic | ICD-10-CM

## 2024-01-30 DIAGNOSIS — M79.606 PAIN IN LEG, UNSPECIFIED: ICD-10-CM

## 2024-01-30 DIAGNOSIS — Z95.810 PRESENCE OF AUTOMATIC (IMPLANTABLE) CARDIAC DEFIBRILLATOR: Chronic | ICD-10-CM

## 2024-01-30 DIAGNOSIS — Z95.1 PRESENCE OF AORTOCORONARY BYPASS GRAFT: Chronic | ICD-10-CM

## 2024-01-30 PROCEDURE — G0277: CPT

## 2024-01-30 PROCEDURE — 82962 GLUCOSE BLOOD TEST: CPT

## 2024-01-30 PROCEDURE — 99183 HYPERBARIC OXYGEN THERAPY: CPT

## 2024-01-30 NOTE — PROCEDURE
[Outpatient] : Outpatient [Cane] : cane [THIS CHAMBER HAS BEEN CLEANED / DISINFECTED] : This chamber has been cleaned / disinfected according to local and hospital policy and procedure prior to this treatment. [____] : Post-Dive: Time - [unfilled] [___] : Post-Dive: Value - [unfilled] mg/dL [I have examined and evaluated this patient today, including ears and lungs and have cleared the patient] : I have examined and evaluated this patient today, including ears and lungs and have cleared the patient to continue HBOT as prescribed.  [Time MD/Provider assessed Patient: _____] : Time MD/Provider assessed Patient: [unfilled] [I have ordered 1 HBOT session at the indicated protocol as seen below] : I have ordered 1 HBOT session at the indicated protocol as seen below [Patient demonstrated and verbalized proper technique for using air break mask] : Patient demonstrated and verbalized proper technique for using air break mask [Patient educated on the risks of SMOKING prior to HBOT with understanding] : Patient educated on the risks of SMOKING prior to HBOT with understanding [Patient educated on the risks of CONSUMING ALCOHOL prior to HBOT with understanding] : Patient educated on the risks of CONSUMING ALCOHOL prior to HBOT with understanding [] : No [100% Cotton] : 100% cotton [Empty all pockets] : empty all pockets [No hair oils, wigs, hairpieces, pins] : no hair oils, wigs, hairpieces, pins  [Pre tx medications] : pre tx medications  [No make-up, creams] : no make-up, creams  [No jewelry] : no jewelry  [No matches, cigarettes, lighters] : no matches, cigarettes, lighters  [Hearing aid removed] : hearing aid removed [Dentures removed] : dentures removed [Ground bracelet on pt's wrist] : ground bracelet on pt's wrist  [Contacts removed] : contacts removed  [Remove nail polish] : remove nail polish  [No reading material] : no reading material  [Bra, undergarments removed] : bra, undergarments removed  [No contraindicated dressings] : no contraindicated dressings [Ground Wire - VISUAL Verification - Intact/Free of Obstruction] : Ground Wire - VISUAL Verification - Intact/Free of Obstruction  [Ground Continuity - Verified < 1 ohm w/ Wrist Strap Moises] : Ground Continuity - Verified < 1 ohm w/ Wrist Strap Moises [Clear all fields] : clear all fields [Number: ___] : Number: [unfilled] [Diagnosis: ___] : Diagnosis: [unfilled] [FreeTextEntry1] : 2.0 [FreeTextEntry3] : 90 [FreeTextEntry5] : 3180 [facility] : 5398 [FreeTextEntry9] : 145 [de-identified] : 155 [de-identified] : 110

## 2024-01-31 ENCOUNTER — APPOINTMENT (OUTPATIENT)
Dept: HYPERBARIC MEDICINE | Facility: CLINIC | Age: 54
End: 2024-01-31
Payer: MEDICARE

## 2024-01-31 ENCOUNTER — OUTPATIENT (OUTPATIENT)
Dept: OUTPATIENT SERVICES | Facility: HOSPITAL | Age: 54
LOS: 1 days | End: 2024-01-31
Payer: MEDICARE

## 2024-01-31 ENCOUNTER — APPOINTMENT (OUTPATIENT)
Dept: INFECTIOUS DISEASE | Facility: CLINIC | Age: 54
End: 2024-01-31
Payer: MEDICARE

## 2024-01-31 VITALS
HEART RATE: 83 BPM | SYSTOLIC BLOOD PRESSURE: 119 MMHG | DIASTOLIC BLOOD PRESSURE: 46 MMHG | TEMPERATURE: 97.2 F | RESPIRATION RATE: 16 BRPM

## 2024-01-31 VITALS
RESPIRATION RATE: 16 BRPM | HEART RATE: 61 BPM | OXYGEN SATURATION: 99 % | SYSTOLIC BLOOD PRESSURE: 168 MMHG | DIASTOLIC BLOOD PRESSURE: 72 MMHG

## 2024-01-31 VITALS
HEART RATE: 80 BPM | WEIGHT: 189 LBS | SYSTOLIC BLOOD PRESSURE: 138 MMHG | DIASTOLIC BLOOD PRESSURE: 69 MMHG | BODY MASS INDEX: 27.91 KG/M2 | OXYGEN SATURATION: 99 %

## 2024-01-31 DIAGNOSIS — Z95.1 PRESENCE OF AORTOCORONARY BYPASS GRAFT: Chronic | ICD-10-CM

## 2024-01-31 DIAGNOSIS — Z95.810 PRESENCE OF AUTOMATIC (IMPLANTABLE) CARDIAC DEFIBRILLATOR: Chronic | ICD-10-CM

## 2024-01-31 DIAGNOSIS — Z89.422 ACQUIRED ABSENCE OF OTHER LEFT TOE(S): Chronic | ICD-10-CM

## 2024-01-31 DIAGNOSIS — M79.606 PAIN IN LEG, UNSPECIFIED: ICD-10-CM

## 2024-01-31 DIAGNOSIS — E55.9 VITAMIN D DEFICIENCY, UNSPECIFIED: ICD-10-CM

## 2024-01-31 LAB
25(OH)D3 SERPL-MCNC: 20.9 NG/ML
ALBUMIN SERPL ELPH-MCNC: 4.5 G/DL
ALP BLD-CCNC: 116 U/L
ALT SERPL-CCNC: 8 U/L
ANION GAP SERPL CALC-SCNC: 11 MMOL/L
AST SERPL-CCNC: 11 U/L
BILIRUB SERPL-MCNC: 0.4 MG/DL
BUN SERPL-MCNC: 32 MG/DL
CALCIUM SERPL-MCNC: 10.8 MG/DL
CHLORIDE SERPL-SCNC: 104 MMOL/L
CO2 SERPL-SCNC: 23 MMOL/L
CREAT SERPL-MCNC: 1.13 MG/DL
CRP SERPL-MCNC: <3 MG/L
EGFR: 78 ML/MIN/1.73M2
ERYTHROCYTE [SEDIMENTATION RATE] IN BLOOD BY WESTERGREN METHOD: 18 MM/HR
ESTIMATED AVERAGE GLUCOSE: 108 MG/DL
GLUCOSE SERPL-MCNC: 138 MG/DL
HBA1C MFR BLD HPLC: 5.4 %
HCT VFR BLD CALC: 37.2 %
HGB BLD-MCNC: 11.4 G/DL
MCHC RBC-ENTMCNC: 27.5 PG
MCHC RBC-ENTMCNC: 30.6 GM/DL
MCV RBC AUTO: 89.9 FL
PLATELET # BLD AUTO: 189 K/UL
POTASSIUM SERPL-SCNC: 4.9 MMOL/L
PROT SERPL-MCNC: 6.3 G/DL
RBC # BLD: 4.14 M/UL
RBC # FLD: 14.6 %
SODIUM SERPL-SCNC: 138 MMOL/L
WBC # FLD AUTO: 4.07 K/UL

## 2024-01-31 PROCEDURE — 82962 GLUCOSE BLOOD TEST: CPT

## 2024-01-31 PROCEDURE — 99183 HYPERBARIC OXYGEN THERAPY: CPT

## 2024-01-31 PROCEDURE — G0277: CPT

## 2024-01-31 PROCEDURE — 99214 OFFICE O/P EST MOD 30 MIN: CPT

## 2024-01-31 RX ORDER — CIPROFLOXACIN HYDROCHLORIDE 500 MG/1
500 TABLET, FILM COATED ORAL
Qty: 60 | Refills: 2 | Status: ACTIVE | COMMUNITY
Start: 2024-01-31 | End: 1900-01-01

## 2024-01-31 NOTE — PROCEDURE
[Outpatient] : Outpatient [Cane] : cane [THIS CHAMBER HAS BEEN CLEANED / DISINFECTED] : This chamber has been cleaned / disinfected according to local and hospital policy and procedure prior to this treatment. [____] : Post-Dive: Time - [unfilled] [___] : Post-Dive: Value - [unfilled] mg/dL [I have examined and evaluated this patient today, including ears and lungs and have cleared the patient] : I have examined and evaluated this patient today, including ears and lungs and have cleared the patient to continue HBOT as prescribed.  [Time MD/Provider assessed Patient: _____] : Time MD/Provider assessed Patient: [unfilled] [I have ordered 1 HBOT session at the indicated protocol as seen below] : I have ordered 1 HBOT session at the indicated protocol as seen below [Patient demonstrated and verbalized proper technique for using air break mask] : Patient demonstrated and verbalized proper technique for using air break mask [Patient educated on the risks of SMOKING prior to HBOT with understanding] : Patient educated on the risks of SMOKING prior to HBOT with understanding [Patient educated on the risks of CONSUMING ALCOHOL prior to HBOT with understanding] : Patient educated on the risks of CONSUMING ALCOHOL prior to HBOT with understanding [100% Cotton] : 100% cotton [Empty all pockets] : empty all pockets [No hair oils, wigs, hairpieces, pins] : no hair oils, wigs, hairpieces, pins  [Pre tx medications] : pre tx medications  [No make-up, creams] : no make-up, creams  [No jewelry] : no jewelry  [No matches, cigarettes, lighters] : no matches, cigarettes, lighters  [Hearing aid removed] : hearing aid removed [Dentures removed] : dentures removed [Ground bracelet on pt's wrist] : ground bracelet on pt's wrist  [Contacts removed] : contacts removed  [Remove nail polish] : remove nail polish  [No reading material] : no reading material  [Bra, undergarments removed] : bra, undergarments removed  [No contraindicated dressings] : no contraindicated dressings [Ground Wire - VISUAL Verification - Intact/Free of Obstruction] : Ground Wire - VISUAL Verification - Intact/Free of Obstruction  [Ground Continuity - Verified < 1 ohm w/ Wrist Strap Moises] : Ground Continuity - Verified < 1 ohm w/ Wrist Strap Moises [Clear all fields] : clear all fields [Number: ___] : Number: [unfilled] [Diagnosis: ___] : Diagnosis: [unfilled] [] : No [FreeTextEntry1] : 2.0 [FreeTextEntry3] : 90 min [FreeTextEntry5] : 3839 [[facility]] : 0710 [FreeTextEntry9] : 1529 [de-identified] : 2832 [de-identified] : 110 min

## 2024-02-01 ENCOUNTER — OUTPATIENT (OUTPATIENT)
Dept: OUTPATIENT SERVICES | Facility: HOSPITAL | Age: 54
LOS: 1 days | End: 2024-02-01
Payer: MEDICARE

## 2024-02-01 ENCOUNTER — APPOINTMENT (OUTPATIENT)
Dept: HYPERBARIC MEDICINE | Facility: CLINIC | Age: 54
End: 2024-02-01
Payer: MEDICARE

## 2024-02-01 VITALS
TEMPERATURE: 97.6 F | HEART RATE: 77 BPM | DIASTOLIC BLOOD PRESSURE: 68 MMHG | RESPIRATION RATE: 16 BRPM | OXYGEN SATURATION: 99 % | SYSTOLIC BLOOD PRESSURE: 149 MMHG

## 2024-02-01 VITALS
RESPIRATION RATE: 16 BRPM | DIASTOLIC BLOOD PRESSURE: 94 MMHG | HEART RATE: 71 BPM | SYSTOLIC BLOOD PRESSURE: 172 MMHG | OXYGEN SATURATION: 99 %

## 2024-02-01 DIAGNOSIS — M86.331 CHRONIC MULTIFOCAL OSTEOMYELITIS, RIGHT RADIUS AND ULNA: ICD-10-CM

## 2024-02-01 DIAGNOSIS — L97.522 NON-PRESSURE CHRONIC ULCER OF OTHER PART OF LEFT FOOT WITH FAT LAYER EXPOSED: ICD-10-CM

## 2024-02-01 DIAGNOSIS — E11.621 TYPE 2 DIABETES MELLITUS WITH FOOT ULCER: ICD-10-CM

## 2024-02-01 DIAGNOSIS — Z89.422 ACQUIRED ABSENCE OF OTHER LEFT TOE(S): Chronic | ICD-10-CM

## 2024-02-01 DIAGNOSIS — Z95.810 PRESENCE OF AUTOMATIC (IMPLANTABLE) CARDIAC DEFIBRILLATOR: Chronic | ICD-10-CM

## 2024-02-01 DIAGNOSIS — M79.606 PAIN IN LEG, UNSPECIFIED: ICD-10-CM

## 2024-02-01 DIAGNOSIS — Z95.1 PRESENCE OF AORTOCORONARY BYPASS GRAFT: Chronic | ICD-10-CM

## 2024-02-01 PROCEDURE — 99183 HYPERBARIC OXYGEN THERAPY: CPT

## 2024-02-01 PROCEDURE — 82962 GLUCOSE BLOOD TEST: CPT

## 2024-02-01 PROCEDURE — G0277: CPT

## 2024-02-01 NOTE — HISTORY OF PRESENT ILLNESS
[FreeTextEntry1] : Patient is  53 year old male who presents for follow of his osteomyelitis. Patient was seen in December at the hospital and was given 6 weeks of Imipenem which completed 1/27/24 for osteo of the R 3rd toe and L 1st toe with cultures growing pseudomonas, klebsiella ESBL and enterococcus. He states that he saw his podiatrist yesterday and states that the wound is better but not fully healed yet. He has no pain in either foot and states that it is much improved from when he first was sick. He has been compliant with his hyperbaric treatment. He had his PICC line in his RUE removed. He denies fever, chills, chest pain, sob, abdominal pain or pain in his lower extremities. He has been having L hip pain but that was from poor positioning during his hyperbaric treatments which has now resolved. Wound care RN comes to his house every 3 days and they are coming after his appointment today.  PMH: kidney transplant 7/16/23 CMV+/+, EBV +/+ with ATG induction currently on tacrolimus, mycophenolate and prednisone, CAD s/p CABG, PAD, vitamin D deficiency, DVT/swelling of LUE on eliquis  SH: lives with sister, retired used to work with [employer], 2015 years ago quit smoking 1 pack per week, Quit drinking in 2015, denies travel

## 2024-02-01 NOTE — END OF VISIT
[] : Fellow [FreeTextEntry3] : Patient seen and examined with Dr Souza  I agree wiht his histroy exam and plans as noted above Patient completed six weeks of IV imipenem for diabetic foot infeciton wiht osteomyelitis in a renal tx patient his foot still remains with open wound wiht a opurulent looking base and he is following wiht podiatry who have recommended surgical debridement/amputation and patient is reluctant at this point will try oral abx suppression but discussed wiht patient that it is likely to fail wihtout adequate limb blood flow

## 2024-02-01 NOTE — REVIEW OF SYSTEMS
[Negative] : Heme/Lymph [Skin Wound] : skin wound [Skin Lesions] : no skin lesions [Itching] : no itching

## 2024-02-01 NOTE — PROCEDURE
[Outpatient] : Outpatient [Cane] : cane [THIS CHAMBER HAS BEEN CLEANED / DISINFECTED] : This chamber has been cleaned / disinfected according to local and hospital policy and procedure prior to this treatment. [____] : Post-Dive: Time - [unfilled] [___] : Post-Dive: Value - [unfilled] mg/dL [I have examined and evaluated this patient today, including ears and lungs and have cleared the patient] : I have examined and evaluated this patient today, including ears and lungs and have cleared the patient to continue HBOT as prescribed.  [Time MD/Provider assessed Patient: _____] : Time MD/Provider assessed Patient: [unfilled] [I have ordered 1 HBOT session at the indicated protocol as seen below] : I have ordered 1 HBOT session at the indicated protocol as seen below [Patient demonstrated and verbalized proper technique for using air break mask] : Patient demonstrated and verbalized proper technique for using air break mask [Patient educated on the risks of SMOKING prior to HBOT with understanding] : Patient educated on the risks of SMOKING prior to HBOT with understanding [Patient educated on the risks of CONSUMING ALCOHOL prior to HBOT with understanding] : Patient educated on the risks of CONSUMING ALCOHOL prior to HBOT with understanding [100% Cotton] : 100% cotton [Empty all pockets] : empty all pockets [No hair oils, wigs, hairpieces, pins] : no hair oils, wigs, hairpieces, pins  [Pre tx medications] : pre tx medications  [No make-up, creams] : no make-up, creams  [No jewelry] : no jewelry  [No matches, cigarettes, lighters] : no matches, cigarettes, lighters  [Hearing aid removed] : hearing aid removed [Dentures removed] : dentures removed [Ground bracelet on pt's wrist] : ground bracelet on pt's wrist  [Contacts removed] : contacts removed  [Remove nail polish] : remove nail polish  [No reading material] : no reading material  [Bra, undergarments removed] : bra, undergarments removed  [No contraindicated dressings] : no contraindicated dressings [Ground Wire - VISUAL Verification - Intact/Free of Obstruction] : Ground Wire - VISUAL Verification - Intact/Free of Obstruction  [Ground Continuity - Verified < 1 ohm w/ Wrist Strap Moises] : Ground Continuity - Verified < 1 ohm w/ Wrist Strap Moises [Diagnosis: ___] : Diagnosis: [unfilled] [Number: ___] : Number: [unfilled] [Clear all fields] : clear all fields [] : No [FreeTextEntry1] : 2.0 jevon [FreeTextEntry3] : 90 min [FreeTextEntry5] : 3357 [FreeTextEntry7] : 1200 [FreeTextEntry9] : 0209 [de-identified] : 0284 [de-identified] : 110 min

## 2024-02-01 NOTE — ASSESSMENT
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 Patient completed course of Imipenem for 6 weeks with last dose being 1/27/24 s/p removal of RUE PICC line. Patient still with wound of the LLE with wound that doesn't appear to be acutely infected.  Assessment and Plan: *Osteomyelitis of the R 3rd toe and L 1st toe with cultures growing pseudomonas, klebsiella ESBL and enterococcus - obtain CBC, CMP, ESR and CRP to assess for remnant infection and also set baseline inflammatory markers incase of recurrence - will start Cipro 500mg PO Q12 for oral pseudomonal suppression so considering immunosuppression, current wound and poor wound healing  - continue wound care - continue surgical follow up, vascular intervention and bariatric therapy per surgical team   *Kidney transplant 7/16/23 CMV+/+, EBV +/+ with ATG induction currently on tacrolimus, mycophenolate and prednisone - continue follow up with transplant nephrology - no acute ID intervention at this time  *Refused Vaccination - patient counseled on the importance of vaccination, patient currently refused vaccination, will attempt to revaccinate at follow up visit  *CAD s/p CABG - continued routine monitoring with cardioloyg  *Vitamin d deficiency - obtain 25OH levels to check need for supplementation  Follow up: 1-2 months with Dr. Shaggy Souza to monitor wound healing/progression of infection

## 2024-02-01 NOTE — PHYSICAL EXAM
[General Appearance - Alert] : alert [General Appearance - In No Acute Distress] : in no acute distress [General Appearance - Well Nourished] : well nourished [General Appearance - Well-Appearing] : healthy appearing [Sclera] : the sclera and conjunctiva were normal [PERRL With Normal Accommodation] : pupils were equal in size, round, reactive to light [Outer Ear] : the ears and nose were normal in appearance [Hearing Threshold Finger Rub Not [place]] : hearing was normal [Oropharynx] : the oropharynx was normal with no thrush [Neck Appearance] : the appearance of the neck was normal [Neck Cervical Mass (___cm)] : no neck mass was observed [Jugular Venous Distention Increased] : there was no jugular-venous distention [Respiration, Rhythm And Depth] : normal respiratory rhythm and effort [Exaggerated Use Of Accessory Muscles For Inspiration] : no accessory muscle use [Auscultation Breath Sounds / Voice Sounds] : lungs were clear to auscultation bilaterally [Heart Rate And Rhythm] : heart rate was normal and rhythm regular [Heart Sounds] : normal S1 and S2 [Heart Sounds Gallop] : no gallops [Murmurs] : no murmurs [Heart Sounds Pericardial Friction Rub] : no pericardial rub [Bowel Sounds] : normal bowel sounds [Abdomen Soft] : soft [Abdomen Tenderness] : non-tender [] : no hepato-splenomegaly [Abdomen Mass (___ Cm)] : no abdominal mass palpated [Costovertebral Angle Tenderness] : no CVA tenderness [Cervical Lymph Nodes Enlarged Posterior Bilaterally] : posterior cervical [Cervical Lymph Nodes Enlarged Anterior Bilaterally] : anterior cervical [Supraclavicular Lymph Nodes Enlarged Bilaterally] : supraclavicular [Musculoskeletal - Swelling] : no joint swelling [Nail Clubbing] : no clubbing  or cyanosis of the fingernails [Motor Tone] : muscle strength and tone were normal [Oriented To Time, Place, And Person] : oriented to person, place, and time [Affect] : the affect was normal [FreeTextEntry1] : venous stasis of the lower extremities, skin changes around cabg scar, LLQ renal transplant scar, L foot on the First MTP joint with a wound on th dorsal aspect with granulation tissue but open wound approx 1.5" across no signs of active infection, LUE fistula

## 2024-02-02 ENCOUNTER — APPOINTMENT (OUTPATIENT)
Dept: HYPERBARIC MEDICINE | Facility: CLINIC | Age: 54
End: 2024-02-02
Payer: MEDICARE

## 2024-02-02 ENCOUNTER — OUTPATIENT (OUTPATIENT)
Dept: OUTPATIENT SERVICES | Facility: HOSPITAL | Age: 54
LOS: 1 days | End: 2024-02-02
Payer: MEDICARE

## 2024-02-02 ENCOUNTER — APPOINTMENT (OUTPATIENT)
Dept: WOUND CARE | Facility: HOSPITAL | Age: 54
End: 2024-02-02
Payer: MEDICARE

## 2024-02-02 VITALS
WEIGHT: 188 LBS | BODY MASS INDEX: 27.85 KG/M2 | DIASTOLIC BLOOD PRESSURE: 56 MMHG | OXYGEN SATURATION: 100 % | SYSTOLIC BLOOD PRESSURE: 160 MMHG | HEIGHT: 69 IN | HEART RATE: 80 BPM | RESPIRATION RATE: 18 BRPM | TEMPERATURE: 98.1 F

## 2024-02-02 VITALS
SYSTOLIC BLOOD PRESSURE: 167 MMHG | HEART RATE: 72 BPM | TEMPERATURE: 97.6 F | DIASTOLIC BLOOD PRESSURE: 77 MMHG | RESPIRATION RATE: 16 BRPM | OXYGEN SATURATION: 99 %

## 2024-02-02 VITALS
SYSTOLIC BLOOD PRESSURE: 176 MMHG | OXYGEN SATURATION: 99 % | RESPIRATION RATE: 16 BRPM | DIASTOLIC BLOOD PRESSURE: 82 MMHG | HEART RATE: 76 BPM

## 2024-02-02 DIAGNOSIS — Z89.422 ACQUIRED ABSENCE OF OTHER LEFT TOE(S): Chronic | ICD-10-CM

## 2024-02-02 DIAGNOSIS — Z95.1 PRESENCE OF AORTOCORONARY BYPASS GRAFT: Chronic | ICD-10-CM

## 2024-02-02 DIAGNOSIS — M79.606 PAIN IN LEG, UNSPECIFIED: ICD-10-CM

## 2024-02-02 PROCEDURE — 11042 DBRDMT SUBQ TIS 1ST 20SQCM/<: CPT

## 2024-02-02 PROCEDURE — 99183 HYPERBARIC OXYGEN THERAPY: CPT

## 2024-02-02 PROCEDURE — 82962 GLUCOSE BLOOD TEST: CPT

## 2024-02-02 PROCEDURE — G0277: CPT

## 2024-02-02 NOTE — HISTORY OF PRESENT ILLNESS
[FreeTextEntry1] : Patient presents with chief complaint of non-healing ulcer 1 metatarsal head left foot +PVD seeing Dr. Malone Right foot ulcer and surgery is completely healed. Non-healing wound left foot with osteomyelitis +DM with PVD Fibrogranular tissue medial 1 metatarsal head with poor healing potential left foot Right foot is completely healed with no signs of infection or wounds right foot PMH: Kidney disease replacement transplant CAD CABGx3 Exposed bone medial 1 metatarsal left foot Patient put on cipro for chronic OM by Dr. Moore ID No ascending cellulitis decreased edema 1 met left foot no erythema

## 2024-02-02 NOTE — PROCEDURE
[Outpatient] : Outpatient [Cane] : cane [THIS CHAMBER HAS BEEN CLEANED / DISINFECTED] : This chamber has been cleaned / disinfected according to local and hospital policy and procedure prior to this treatment. [____] : Post-Dive: Time - [unfilled] [___] : Post-Dive: Value - [unfilled] mg/dL [I have examined and evaluated this patient today, including ears and lungs and have cleared the patient] : I have examined and evaluated this patient today, including ears and lungs and have cleared the patient to continue HBOT as prescribed.  [I have ordered 1 HBOT session at the indicated protocol as seen below] : I have ordered 1 HBOT session at the indicated protocol as seen below [Patient demonstrated and verbalized proper technique for using air break mask] : Patient demonstrated and verbalized proper technique for using air break mask [Patient educated on the risks of SMOKING prior to HBOT with understanding] : Patient educated on the risks of SMOKING prior to HBOT with understanding [Patient educated on the risks of CONSUMING ALCOHOL prior to HBOT with understanding] : Patient educated on the risks of CONSUMING ALCOHOL prior to HBOT with understanding [100% Cotton] : 100% cotton [Empty all pockets] : empty all pockets [No hair oils, wigs, hairpieces, pins] : no hair oils, wigs, hairpieces, pins  [Pre tx medications] : pre tx medications  [No make-up, creams] : no make-up, creams  [No jewelry] : no jewelry  [No matches, cigarettes, lighters] : no matches, cigarettes, lighters  [Hearing aid removed] : hearing aid removed [Dentures removed] : dentures removed [Ground bracelet on pt's wrist] : ground bracelet on pt's wrist  [Contacts removed] : contacts removed  [Remove nail polish] : remove nail polish  [No reading material] : no reading material  [Bra, undergarments removed] : bra, undergarments removed  [No contraindicated dressings] : no contraindicated dressings [Ground Wire - VISUAL Verification - Intact/Free of Obstruction] : Ground Wire - VISUAL Verification - Intact/Free of Obstruction  [Ground Continuity - Verified < 1 ohm w/ Wrist Strap Moises] : Ground Continuity - Verified < 1 ohm w/ Wrist Strap Moises [Diagnosis: ___] : Diagnosis: [unfilled] [Number: ___] : Number: [unfilled] [Clear all fields] : clear all fields [] : No [FreeTextEntry1] : 2.0 jevon [FreeTextEntry3] : 90 min [FreeTextEntry5] : 1200 [FreeTextEntry7] : 1217 [FreeTextEntry9] : 2781 [de-identified] : 4936 [de-identified] : 110 min

## 2024-02-05 ENCOUNTER — APPOINTMENT (OUTPATIENT)
Dept: HYPERBARIC MEDICINE | Facility: CLINIC | Age: 54
End: 2024-02-05
Payer: MEDICARE

## 2024-02-05 ENCOUNTER — OUTPATIENT (OUTPATIENT)
Dept: OUTPATIENT SERVICES | Facility: HOSPITAL | Age: 54
LOS: 1 days | End: 2024-02-05
Payer: MEDICARE

## 2024-02-05 VITALS
OXYGEN SATURATION: 100 % | HEART RATE: 78 BPM | TEMPERATURE: 97.7 F | SYSTOLIC BLOOD PRESSURE: 145 MMHG | DIASTOLIC BLOOD PRESSURE: 73 MMHG | RESPIRATION RATE: 16 BRPM

## 2024-02-05 VITALS
DIASTOLIC BLOOD PRESSURE: 54 MMHG | HEART RATE: 56 BPM | SYSTOLIC BLOOD PRESSURE: 160 MMHG | OXYGEN SATURATION: 100 % | RESPIRATION RATE: 16 BRPM

## 2024-02-05 DIAGNOSIS — M79.606 PAIN IN LEG, UNSPECIFIED: ICD-10-CM

## 2024-02-05 DIAGNOSIS — Z95.810 PRESENCE OF AUTOMATIC (IMPLANTABLE) CARDIAC DEFIBRILLATOR: Chronic | ICD-10-CM

## 2024-02-05 DIAGNOSIS — L97.522 NON-PRESSURE CHRONIC ULCER OF OTHER PART OF LEFT FOOT WITH FAT LAYER EXPOSED: ICD-10-CM

## 2024-02-05 DIAGNOSIS — E11.621 TYPE 2 DIABETES MELLITUS WITH FOOT ULCER: ICD-10-CM

## 2024-02-05 DIAGNOSIS — Z89.422 ACQUIRED ABSENCE OF OTHER LEFT TOE(S): Chronic | ICD-10-CM

## 2024-02-05 DIAGNOSIS — M86.331 CHRONIC MULTIFOCAL OSTEOMYELITIS, RIGHT RADIUS AND ULNA: ICD-10-CM

## 2024-02-05 PROCEDURE — 82962 GLUCOSE BLOOD TEST: CPT

## 2024-02-05 PROCEDURE — G0277: CPT

## 2024-02-05 PROCEDURE — 99183 HYPERBARIC OXYGEN THERAPY: CPT

## 2024-02-05 NOTE — PROCEDURE
[Outpatient] : Outpatient [Cane] : cane [THIS CHAMBER HAS BEEN CLEANED / DISINFECTED] : This chamber has been cleaned / disinfected according to local and hospital policy and procedure prior to this treatment. [____] : Post-Dive: Time - [unfilled] [___] : Post-Dive: Value - [unfilled] mg/dL [I have examined and evaluated this patient today, including ears and lungs and have cleared the patient] : I have examined and evaluated this patient today, including ears and lungs and have cleared the patient to continue HBOT as prescribed.  [I have ordered 1 HBOT session at the indicated protocol as seen below] : I have ordered 1 HBOT session at the indicated protocol as seen below [Patient demonstrated and verbalized proper technique for using air break mask] : Patient demonstrated and verbalized proper technique for using air break mask [Patient educated on the risks of SMOKING prior to HBOT with understanding] : Patient educated on the risks of SMOKING prior to HBOT with understanding [Patient educated on the risks of CONSUMING ALCOHOL prior to HBOT with understanding] : Patient educated on the risks of CONSUMING ALCOHOL prior to HBOT with understanding [100% Cotton] : 100% cotton [Empty all pockets] : empty all pockets [No hair oils, wigs, hairpieces, pins] : no hair oils, wigs, hairpieces, pins  [Pre tx medications] : pre tx medications  [No make-up, creams] : no make-up, creams  [No jewelry] : no jewelry  [No matches, cigarettes, lighters] : no matches, cigarettes, lighters  [Hearing aid removed] : hearing aid removed [Dentures removed] : dentures removed [Ground bracelet on pt's wrist] : ground bracelet on pt's wrist  [Contacts removed] : contacts removed  [Remove nail polish] : remove nail polish  [No reading material] : no reading material  [Bra, undergarments removed] : bra, undergarments removed  [No contraindicated dressings] : no contraindicated dressings [Ground Wire - VISUAL Verification - Intact/Free of Obstruction] : Ground Wire - VISUAL Verification - Intact/Free of Obstruction  [Ground Continuity - Verified < 1 ohm w/ Wrist Strap Moises] : Ground Continuity - Verified < 1 ohm w/ Wrist Strap Moises [Diagnosis: ___] : Diagnosis: [unfilled] [Number: ___] : Number: [unfilled] [Clear all fields] : clear all fields [] : No [FreeTextEntry1] : 2.0 jevon [FreeTextEntry3] : 90 min [FreeTextEntry5] : 1200 [FreeTextEntry7] : 1217 [FreeTextEntry9] : 7758 [de-identified] : 3215 [de-identified] : 110 min

## 2024-02-06 ENCOUNTER — OUTPATIENT (OUTPATIENT)
Dept: OUTPATIENT SERVICES | Facility: HOSPITAL | Age: 54
LOS: 1 days | End: 2024-02-06
Payer: MEDICARE

## 2024-02-06 ENCOUNTER — APPOINTMENT (OUTPATIENT)
Dept: HYPERBARIC MEDICINE | Facility: CLINIC | Age: 54
End: 2024-02-06
Payer: MEDICARE

## 2024-02-06 VITALS
OXYGEN SATURATION: 99 % | TEMPERATURE: 98.3 F | HEART RATE: 87 BPM | DIASTOLIC BLOOD PRESSURE: 64 MMHG | RESPIRATION RATE: 16 BRPM | SYSTOLIC BLOOD PRESSURE: 136 MMHG

## 2024-02-06 DIAGNOSIS — Z95.810 PRESENCE OF AUTOMATIC (IMPLANTABLE) CARDIAC DEFIBRILLATOR: Chronic | ICD-10-CM

## 2024-02-06 DIAGNOSIS — Z95.1 PRESENCE OF AORTOCORONARY BYPASS GRAFT: Chronic | ICD-10-CM

## 2024-02-06 DIAGNOSIS — M79.606 PAIN IN LEG, UNSPECIFIED: ICD-10-CM

## 2024-02-06 PROCEDURE — 99183 HYPERBARIC OXYGEN THERAPY: CPT

## 2024-02-06 PROCEDURE — G0277: CPT

## 2024-02-06 PROCEDURE — 82962 GLUCOSE BLOOD TEST: CPT

## 2024-02-06 NOTE — PROCEDURE
[Outpatient] : Outpatient [Cane] : cane [THIS CHAMBER HAS BEEN CLEANED / DISINFECTED] : This chamber has been cleaned / disinfected according to local and hospital policy and procedure prior to this treatment. [____] : Post-Dive: Time - [unfilled] [___] : Post-Dive: Value - [unfilled] mg/dL [I have examined and evaluated this patient today, including ears and lungs and have cleared the patient] : I have examined and evaluated this patient today, including ears and lungs and have cleared the patient to continue HBOT as prescribed.  [I have ordered 1 HBOT session at the indicated protocol as seen below] : I have ordered 1 HBOT session at the indicated protocol as seen below [Patient demonstrated and verbalized proper technique for using air break mask] : Patient demonstrated and verbalized proper technique for using air break mask [Patient educated on the risks of SMOKING prior to HBOT with understanding] : Patient educated on the risks of SMOKING prior to HBOT with understanding [Patient educated on the risks of CONSUMING ALCOHOL prior to HBOT with understanding] : Patient educated on the risks of CONSUMING ALCOHOL prior to HBOT with understanding [100% Cotton] : 100% cotton [Empty all pockets] : empty all pockets [No hair oils, wigs, hairpieces, pins] : no hair oils, wigs, hairpieces, pins  [Pre tx medications] : pre tx medications  [No make-up, creams] : no make-up, creams  [No jewelry] : no jewelry  [No matches, cigarettes, lighters] : no matches, cigarettes, lighters  [Hearing aid removed] : hearing aid removed [Dentures removed] : dentures removed [Ground bracelet on pt's wrist] : ground bracelet on pt's wrist  [Contacts removed] : contacts removed  [Remove nail polish] : remove nail polish  [No reading material] : no reading material  [Bra, undergarments removed] : bra, undergarments removed  [No contraindicated dressings] : no contraindicated dressings [Ground Wire - VISUAL Verification - Intact/Free of Obstruction] : Ground Wire - VISUAL Verification - Intact/Free of Obstruction  [Ground Continuity - Verified < 1 ohm w/ Wrist Strap Moises] : Ground Continuity - Verified < 1 ohm w/ Wrist Strap Moises [Diagnosis: ___] : Diagnosis: [unfilled] [Number: ___] : Number: [unfilled] [Clear all fields] : clear all fields [] : No [FreeTextEntry1] : 2.0 jevon [FreeTextEntry3] : 90 min [FreeTextEntry5] : 4879 [FreeTextEntry7] : 1200 [FreeTextEntry9] : 5773 [de-identified] : 1069 [de-identified] : 110 min

## 2024-02-07 ENCOUNTER — OUTPATIENT (OUTPATIENT)
Dept: OUTPATIENT SERVICES | Facility: HOSPITAL | Age: 54
LOS: 1 days | End: 2024-02-07
Payer: MEDICARE

## 2024-02-07 ENCOUNTER — APPOINTMENT (OUTPATIENT)
Dept: HYPERBARIC MEDICINE | Facility: CLINIC | Age: 54
End: 2024-02-07
Payer: MEDICARE

## 2024-02-07 VITALS
SYSTOLIC BLOOD PRESSURE: 153 MMHG | DIASTOLIC BLOOD PRESSURE: 71 MMHG | RESPIRATION RATE: 16 BRPM | OXYGEN SATURATION: 100 % | HEART RATE: 93 BPM | TEMPERATURE: 97.3 F

## 2024-02-07 VITALS
OXYGEN SATURATION: 100 % | HEART RATE: 69 BPM | SYSTOLIC BLOOD PRESSURE: 174 MMHG | RESPIRATION RATE: 16 BRPM | DIASTOLIC BLOOD PRESSURE: 96 MMHG

## 2024-02-07 DIAGNOSIS — M79.606 PAIN IN LEG, UNSPECIFIED: ICD-10-CM

## 2024-02-07 DIAGNOSIS — Z89.422 ACQUIRED ABSENCE OF OTHER LEFT TOE(S): Chronic | ICD-10-CM

## 2024-02-07 DIAGNOSIS — Z95.1 PRESENCE OF AORTOCORONARY BYPASS GRAFT: Chronic | ICD-10-CM

## 2024-02-07 DIAGNOSIS — Z95.810 PRESENCE OF AUTOMATIC (IMPLANTABLE) CARDIAC DEFIBRILLATOR: Chronic | ICD-10-CM

## 2024-02-07 PROCEDURE — 99183 HYPERBARIC OXYGEN THERAPY: CPT

## 2024-02-07 PROCEDURE — G0277: CPT

## 2024-02-07 PROCEDURE — 82962 GLUCOSE BLOOD TEST: CPT

## 2024-02-07 NOTE — PROCEDURE
[Outpatient] : Outpatient [Cane] : cane [THIS CHAMBER HAS BEEN CLEANED / DISINFECTED] : This chamber has been cleaned / disinfected according to local and hospital policy and procedure prior to this treatment. [I have examined and evaluated this patient today, including ears and lungs and have cleared the patient] : I have examined and evaluated this patient today, including ears and lungs and have cleared the patient to continue HBOT as prescribed.  [I have ordered 1 HBOT session at the indicated protocol as seen below] : I have ordered 1 HBOT session at the indicated protocol as seen below [Patient demonstrated and verbalized proper technique for using air break mask] : Patient demonstrated and verbalized proper technique for using air break mask [Patient educated on the risks of SMOKING prior to HBOT with understanding] : Patient educated on the risks of SMOKING prior to HBOT with understanding [Patient educated on the risks of CONSUMING ALCOHOL prior to HBOT with understanding] : Patient educated on the risks of CONSUMING ALCOHOL prior to HBOT with understanding [100% Cotton] : 100% cotton [Empty all pockets] : empty all pockets [No hair oils, wigs, hairpieces, pins] : no hair oils, wigs, hairpieces, pins  [Pre tx medications] : pre tx medications  [No make-up, creams] : no make-up, creams  [No jewelry] : no jewelry  [No matches, cigarettes, lighters] : no matches, cigarettes, lighters  [Hearing aid removed] : hearing aid removed [Dentures removed] : dentures removed [Ground bracelet on pt's wrist] : ground bracelet on pt's wrist  [Contacts removed] : contacts removed  [Remove nail polish] : remove nail polish  [No reading material] : no reading material  [Bra, undergarments removed] : bra, undergarments removed  [No contraindicated dressings] : no contraindicated dressings [Ground Wire - VISUAL Verification - Intact/Free of Obstruction] : Ground Wire - VISUAL Verification - Intact/Free of Obstruction  [Ground Continuity - Verified < 1 ohm w/ Wrist Strap Moises] : Ground Continuity - Verified < 1 ohm w/ Wrist Strap Moises [Diagnosis: ___] : Diagnosis: [unfilled] [Number: ___] : Number: [unfilled] [____] : Recheck: Time - [unfilled] [___] : Recheck: Value - [unfilled] mg/dL [Clear all fields] : clear all fields [] : No [FreeTextEntry1] : 2.0 jevon [FreeTextEntry3] : 90 min [FreeTextEntry5] : 0749 [FreeTextEntry7] : 1200 [FreeTextEntry9] : 7174 [de-identified] : 0610 [de-identified] : 110 min

## 2024-02-07 NOTE — ADDENDUM
[FreeTextEntry1] : Pt descended to 2.0 DANAE @ 1.5 PSI/min without incident in chamber #4 Pt resting @ depth with chest rise and fall observed throughout tx. Pt ascended from 2.0 DANAE @ 1.5 PSI/min without incident. Pt tolerated tx well.  Pt provided with juice to raise bgl.

## 2024-02-08 ENCOUNTER — OUTPATIENT (OUTPATIENT)
Dept: OUTPATIENT SERVICES | Facility: HOSPITAL | Age: 54
LOS: 1 days | End: 2024-02-08
Payer: MEDICARE

## 2024-02-08 ENCOUNTER — APPOINTMENT (OUTPATIENT)
Dept: HYPERBARIC MEDICINE | Facility: CLINIC | Age: 54
End: 2024-02-08
Payer: MEDICARE

## 2024-02-08 VITALS
RESPIRATION RATE: 16 BRPM | TEMPERATURE: 98 F | DIASTOLIC BLOOD PRESSURE: 50 MMHG | OXYGEN SATURATION: 99 % | SYSTOLIC BLOOD PRESSURE: 142 MMHG | HEART RATE: 80 BPM

## 2024-02-08 VITALS
DIASTOLIC BLOOD PRESSURE: 73 MMHG | RESPIRATION RATE: 16 BRPM | OXYGEN SATURATION: 99 % | HEART RATE: 75 BPM | SYSTOLIC BLOOD PRESSURE: 162 MMHG

## 2024-02-08 DIAGNOSIS — M86.60 OTHER CHRONIC OSTEOMYELITIS, UNSPECIFIED SITE: ICD-10-CM

## 2024-02-08 DIAGNOSIS — M86.331 CHRONIC MULTIFOCAL OSTEOMYELITIS, RIGHT RADIUS AND ULNA: ICD-10-CM

## 2024-02-08 DIAGNOSIS — M79.606 PAIN IN LEG, UNSPECIFIED: ICD-10-CM

## 2024-02-08 DIAGNOSIS — L97.522 NON-PRESSURE CHRONIC ULCER OF OTHER PART OF LEFT FOOT WITH FAT LAYER EXPOSED: ICD-10-CM

## 2024-02-08 DIAGNOSIS — L97.524 NON-PRESSURE CHRONIC ULCER OF OTHER PART OF LEFT FOOT WITH NECROSIS OF BONE: ICD-10-CM

## 2024-02-08 DIAGNOSIS — I77.1 STRICTURE OF ARTERY: ICD-10-CM

## 2024-02-08 DIAGNOSIS — E11.621 TYPE 2 DIABETES MELLITUS WITH FOOT ULCER: ICD-10-CM

## 2024-02-08 DIAGNOSIS — M86.9 OSTEOMYELITIS, UNSPECIFIED: ICD-10-CM

## 2024-02-08 DIAGNOSIS — Z95.1 PRESENCE OF AORTOCORONARY BYPASS GRAFT: Chronic | ICD-10-CM

## 2024-02-08 PROCEDURE — 99183 HYPERBARIC OXYGEN THERAPY: CPT

## 2024-02-08 PROCEDURE — G0277: CPT

## 2024-02-08 PROCEDURE — 82962 GLUCOSE BLOOD TEST: CPT

## 2024-02-08 NOTE — PROCEDURE
[Outpatient] : Outpatient [Cane] : cane [THIS CHAMBER HAS BEEN CLEANED / DISINFECTED] : This chamber has been cleaned / disinfected according to local and hospital policy and procedure prior to this treatment. [____] : Recheck: Time - [unfilled] [___] : Recheck: Value - [unfilled] mg/dL [I have examined and evaluated this patient today, including ears and lungs and have cleared the patient] : I have examined and evaluated this patient today, including ears and lungs and have cleared the patient to continue HBOT as prescribed.  [I have ordered 1 HBOT session at the indicated protocol as seen below] : I have ordered 1 HBOT session at the indicated protocol as seen below [Patient demonstrated and verbalized proper technique for using air break mask] : Patient demonstrated and verbalized proper technique for using air break mask [Patient educated on the risks of SMOKING prior to HBOT with understanding] : Patient educated on the risks of SMOKING prior to HBOT with understanding [Patient educated on the risks of CONSUMING ALCOHOL prior to HBOT with understanding] : Patient educated on the risks of CONSUMING ALCOHOL prior to HBOT with understanding [100% Cotton] : 100% cotton [Empty all pockets] : empty all pockets [No hair oils, wigs, hairpieces, pins] : no hair oils, wigs, hairpieces, pins  [Pre tx medications] : pre tx medications  [No make-up, creams] : no make-up, creams  [No jewelry] : no jewelry  [No matches, cigarettes, lighters] : no matches, cigarettes, lighters  [Hearing aid removed] : hearing aid removed [Dentures removed] : dentures removed [Ground bracelet on pt's wrist] : ground bracelet on pt's wrist  [Contacts removed] : contacts removed  [Remove nail polish] : remove nail polish  [No reading material] : no reading material  [Bra, undergarments removed] : bra, undergarments removed  [No contraindicated dressings] : no contraindicated dressings [Ground Wire - VISUAL Verification - Intact/Free of Obstruction] : Ground Wire - VISUAL Verification - Intact/Free of Obstruction  [Ground Continuity - Verified < 1 ohm w/ Wrist Strap Moises] : Ground Continuity - Verified < 1 ohm w/ Wrist Strap Moises [Diagnosis: ___] : Diagnosis: [unfilled] [Number: ___] : Number: [unfilled] [Clear all fields] : clear all fields [] : No [FreeTextEntry1] : 2.0 jevon [FreeTextEntry3] : 90 min [FreeTextEntry5] : 5352 [FreeTextEntry7] : 1200 [FreeTextEntry9] : 2378 [de-identified] : 3447 [de-identified] : 110 min

## 2024-02-09 ENCOUNTER — OUTPATIENT (OUTPATIENT)
Dept: OUTPATIENT SERVICES | Facility: HOSPITAL | Age: 54
LOS: 1 days | End: 2024-02-09
Payer: MEDICARE

## 2024-02-09 ENCOUNTER — APPOINTMENT (OUTPATIENT)
Dept: WOUND CARE | Facility: HOSPITAL | Age: 54
End: 2024-02-09
Payer: MEDICARE

## 2024-02-09 ENCOUNTER — APPOINTMENT (OUTPATIENT)
Dept: HYPERBARIC MEDICINE | Facility: CLINIC | Age: 54
End: 2024-02-09
Payer: MEDICARE

## 2024-02-09 VITALS
HEART RATE: 90 BPM | TEMPERATURE: 98.4 F | SYSTOLIC BLOOD PRESSURE: 113 MMHG | DIASTOLIC BLOOD PRESSURE: 66 MMHG | RESPIRATION RATE: 16 BRPM | OXYGEN SATURATION: 99 %

## 2024-02-09 VITALS
SYSTOLIC BLOOD PRESSURE: 163 MMHG | HEART RATE: 71 BPM | DIASTOLIC BLOOD PRESSURE: 65 MMHG | OXYGEN SATURATION: 99 % | RESPIRATION RATE: 16 BRPM

## 2024-02-09 DIAGNOSIS — Z95.810 PRESENCE OF AUTOMATIC (IMPLANTABLE) CARDIAC DEFIBRILLATOR: Chronic | ICD-10-CM

## 2024-02-09 DIAGNOSIS — M79.606 PAIN IN LEG, UNSPECIFIED: ICD-10-CM

## 2024-02-09 DIAGNOSIS — E11.621 TYPE 2 DIABETES MELLITUS WITH FOOT ULCER: ICD-10-CM

## 2024-02-09 DIAGNOSIS — M86.331 CHRONIC MULTIFOCAL OSTEOMYELITIS, RIGHT RADIUS AND ULNA: ICD-10-CM

## 2024-02-09 DIAGNOSIS — Z95.1 PRESENCE OF AORTOCORONARY BYPASS GRAFT: Chronic | ICD-10-CM

## 2024-02-09 DIAGNOSIS — Z89.422 ACQUIRED ABSENCE OF OTHER LEFT TOE(S): Chronic | ICD-10-CM

## 2024-02-09 DIAGNOSIS — L97.522 NON-PRESSURE CHRONIC ULCER OF OTHER PART OF LEFT FOOT WITH FAT LAYER EXPOSED: ICD-10-CM

## 2024-02-09 PROCEDURE — 82962 GLUCOSE BLOOD TEST: CPT

## 2024-02-09 PROCEDURE — 99183 HYPERBARIC OXYGEN THERAPY: CPT

## 2024-02-09 PROCEDURE — G0277: CPT

## 2024-02-09 PROCEDURE — 15275 SKIN SUB GRAFT FACE/NK/HF/G: CPT

## 2024-02-09 NOTE — PROCEDURE
[Outpatient] : Outpatient [Cane] : cane [THIS CHAMBER HAS BEEN CLEANED / DISINFECTED] : This chamber has been cleaned / disinfected according to local and hospital policy and procedure prior to this treatment. [____] : Recheck: Time - [unfilled] [___] : Recheck: Value - [unfilled] mg/dL [I have examined and evaluated this patient today, including ears and lungs and have cleared the patient] : I have examined and evaluated this patient today, including ears and lungs and have cleared the patient to continue HBOT as prescribed.  [I have ordered 1 HBOT session at the indicated protocol as seen below] : I have ordered 1 HBOT session at the indicated protocol as seen below [Patient demonstrated and verbalized proper technique for using air break mask] : Patient demonstrated and verbalized proper technique for using air break mask [Patient educated on the risks of SMOKING prior to HBOT with understanding] : Patient educated on the risks of SMOKING prior to HBOT with understanding [Patient educated on the risks of CONSUMING ALCOHOL prior to HBOT with understanding] : Patient educated on the risks of CONSUMING ALCOHOL prior to HBOT with understanding [100% Cotton] : 100% cotton [Empty all pockets] : empty all pockets [No hair oils, wigs, hairpieces, pins] : no hair oils, wigs, hairpieces, pins  [Pre tx medications] : pre tx medications  [No make-up, creams] : no make-up, creams  [No jewelry] : no jewelry  [No matches, cigarettes, lighters] : no matches, cigarettes, lighters  [Hearing aid removed] : hearing aid removed [Dentures removed] : dentures removed [Ground bracelet on pt's wrist] : ground bracelet on pt's wrist  [Contacts removed] : contacts removed  [Remove nail polish] : remove nail polish  [No reading material] : no reading material  [Bra, undergarments removed] : bra, undergarments removed  [No contraindicated dressings] : no contraindicated dressings [Ground Wire - VISUAL Verification - Intact/Free of Obstruction] : Ground Wire - VISUAL Verification - Intact/Free of Obstruction  [Ground Continuity - Verified < 1 ohm w/ Wrist Strap Moises] : Ground Continuity - Verified < 1 ohm w/ Wrist Strap Moises [Diagnosis: ___] : Diagnosis: [unfilled] [Number: ___] : Number: [unfilled] [Clear all fields] : clear all fields [] : No [FreeTextEntry1] : 2.0 jevon [FreeTextEntry3] : 90 min [FreeTextEntry5] : 1200 [FreeTextEntry7] : 1215 [FreeTextEntry9] : 4124 [de-identified] : 2327 [de-identified] : 110 min

## 2024-02-09 NOTE — PLAN
[FreeTextEntry1] : full thickness debridement of ulceration elft foot with sterile Curette down to the level of the subcutaneous tissue Patient using santyl collagenase with gauze and julius daily Patient doing HBO therapy patient presents for Apligraf for left foot Explained to patient that since he does not have any revasc options accoring to Dr. 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 Malone regarding PVD continue daily dressing changes and surgical shoe left foot   Apligraf number (1) applied today to patients (left foot).   Wound bed debrided of bioburden and prepped for product application.  (1  piece and original size of product) obtained.  Total product usage was (44 sq. cm), Total waste (0 sq. cm) due to wound size.  Product secured with (steri strips and bolster dressing). Patient told to return 1 week no need for oral antibiosis

## 2024-02-09 NOTE — HISTORY OF PRESENT ILLNESS
[FreeTextEntry1] : Patient presents with chief complaint of non-healing ulcer 1 metatarsal head left foot +PVD seeing Dr. Malone Right foot ulcer and surgery is completely healed. Non-healing wound left foot with osteomyelitis +DM with PVD Fibrogranular tissue medial 1 metatarsal head with poor healing potential left foot Right foot is completely healed with no signs of infection or wounds right foot PMH: Kidney disease replacement transplant CAD CABGx3 Exposed bone medial 1 metatarsal left foot Patient put on cipro for chronic OM by Dr. Moore ID No ascending cellulitis decreased edema 1 met left foot no erythema chronic osteomyelitis left foot

## 2024-02-12 ENCOUNTER — OUTPATIENT (OUTPATIENT)
Dept: OUTPATIENT SERVICES | Facility: HOSPITAL | Age: 54
LOS: 1 days | End: 2024-02-12
Payer: MEDICARE

## 2024-02-12 ENCOUNTER — APPOINTMENT (OUTPATIENT)
Dept: HYPERBARIC MEDICINE | Facility: CLINIC | Age: 54
End: 2024-02-12
Payer: MEDICARE

## 2024-02-12 VITALS
TEMPERATURE: 97.8 F | SYSTOLIC BLOOD PRESSURE: 154 MMHG | DIASTOLIC BLOOD PRESSURE: 84 MMHG | RESPIRATION RATE: 16 BRPM | HEART RATE: 97 BPM

## 2024-02-12 DIAGNOSIS — E11.621 TYPE 2 DIABETES MELLITUS WITH FOOT ULCER: ICD-10-CM

## 2024-02-12 DIAGNOSIS — L97.522 NON-PRESSURE CHRONIC ULCER OF OTHER PART OF LEFT FOOT WITH FAT LAYER EXPOSED: ICD-10-CM

## 2024-02-12 DIAGNOSIS — M86.331 CHRONIC MULTIFOCAL OSTEOMYELITIS, RIGHT RADIUS AND ULNA: ICD-10-CM

## 2024-02-12 DIAGNOSIS — Z95.810 PRESENCE OF AUTOMATIC (IMPLANTABLE) CARDIAC DEFIBRILLATOR: Chronic | ICD-10-CM

## 2024-02-12 DIAGNOSIS — Z95.1 PRESENCE OF AORTOCORONARY BYPASS GRAFT: Chronic | ICD-10-CM

## 2024-02-12 DIAGNOSIS — Z89.422 ACQUIRED ABSENCE OF OTHER LEFT TOE(S): Chronic | ICD-10-CM

## 2024-02-12 PROCEDURE — 82962 GLUCOSE BLOOD TEST: CPT

## 2024-02-12 PROCEDURE — 99183 HYPERBARIC OXYGEN THERAPY: CPT

## 2024-02-12 PROCEDURE — G0277: CPT

## 2024-02-12 RX ORDER — TACROLIMUS 1 MG/1
1 TABLET, EXTENDED RELEASE ORAL DAILY
Qty: 60 | Refills: 4 | Status: ACTIVE | COMMUNITY
Start: 2023-07-17 | End: 1900-01-01

## 2024-02-12 NOTE — PROCEDURE
[Outpatient] : Outpatient [Cane] : cane [THIS CHAMBER HAS BEEN CLEANED / DISINFECTED] : This chamber has been cleaned / disinfected according to local and hospital policy and procedure prior to this treatment. [____] : Recheck: Time - [unfilled] [___] : Recheck: Value - [unfilled] mg/dL [I have examined and evaluated this patient today, including ears and lungs and have cleared the patient] : I have examined and evaluated this patient today, including ears and lungs and have cleared the patient to continue HBOT as prescribed.  [I have ordered 1 HBOT session at the indicated protocol as seen below] : I have ordered 1 HBOT session at the indicated protocol as seen below [Patient demonstrated and verbalized proper technique for using air break mask] : Patient demonstrated and verbalized proper technique for using air break mask [Patient educated on the risks of SMOKING prior to HBOT with understanding] : Patient educated on the risks of SMOKING prior to HBOT with understanding [Patient educated on the risks of CONSUMING ALCOHOL prior to HBOT with understanding] : Patient educated on the risks of CONSUMING ALCOHOL prior to HBOT with understanding [100% Cotton] : 100% cotton [Empty all pockets] : empty all pockets [No hair oils, wigs, hairpieces, pins] : no hair oils, wigs, hairpieces, pins  [Pre tx medications] : pre tx medications  [No make-up, creams] : no make-up, creams  [No jewelry] : no jewelry  [No matches, cigarettes, lighters] : no matches, cigarettes, lighters  [Hearing aid removed] : hearing aid removed [Dentures removed] : dentures removed [Ground bracelet on pt's wrist] : ground bracelet on pt's wrist  [Contacts removed] : contacts removed  [Remove nail polish] : remove nail polish  [No reading material] : no reading material  [Bra, undergarments removed] : bra, undergarments removed  [No contraindicated dressings] : no contraindicated dressings [Ground Wire - VISUAL Verification - Intact/Free of Obstruction] : Ground Wire - VISUAL Verification - Intact/Free of Obstruction  [Ground Continuity - Verified < 1 ohm w/ Wrist Strap Moises] : Ground Continuity - Verified < 1 ohm w/ Wrist Strap Moises [Clear all fields] : clear all fields [Number: ___] : Number: [unfilled] [Diagnosis: ___] : Diagnosis: [unfilled] [] : No [FreeTextEntry1] : 2.0 jevon [FreeTextEntry3] : 90 min [FreeTextEntry5] : 1200 [FreeTextEntry7] : 1219 [FreeTextEntry9] : 1633 [de-identified] : 5413 [de-identified] : 110 min

## 2024-02-13 ENCOUNTER — EMERGENCY (EMERGENCY)
Facility: HOSPITAL | Age: 54
LOS: 1 days | Discharge: HOME | End: 2024-02-13
Attending: EMERGENCY MEDICINE
Payer: MEDICARE

## 2024-02-13 ENCOUNTER — APPOINTMENT (OUTPATIENT)
Dept: HYPERBARIC MEDICINE | Facility: CLINIC | Age: 54
End: 2024-02-13

## 2024-02-13 VITALS
SYSTOLIC BLOOD PRESSURE: 174 MMHG | OXYGEN SATURATION: 99 % | HEART RATE: 75 BPM | TEMPERATURE: 98 F | DIASTOLIC BLOOD PRESSURE: 70 MMHG | RESPIRATION RATE: 20 BRPM

## 2024-02-13 VITALS
HEIGHT: 69 IN | WEIGHT: 188.94 LBS | RESPIRATION RATE: 20 BRPM | TEMPERATURE: 98 F | SYSTOLIC BLOOD PRESSURE: 159 MMHG | OXYGEN SATURATION: 98 % | DIASTOLIC BLOOD PRESSURE: 67 MMHG | HEART RATE: 96 BPM

## 2024-02-13 DIAGNOSIS — Z95.1 PRESENCE OF AORTOCORONARY BYPASS GRAFT: Chronic | ICD-10-CM

## 2024-02-13 DIAGNOSIS — Z89.422 ACQUIRED ABSENCE OF OTHER LEFT TOE(S): Chronic | ICD-10-CM

## 2024-02-13 DIAGNOSIS — Z95.810 PRESENCE OF AUTOMATIC (IMPLANTABLE) CARDIAC DEFIBRILLATOR: Chronic | ICD-10-CM

## 2024-02-13 LAB
ALBUMIN SERPL ELPH-MCNC: 3.7 G/DL — SIGNIFICANT CHANGE UP (ref 3.3–5)
ALP SERPL-CCNC: 93 U/L — SIGNIFICANT CHANGE UP (ref 40–120)
ALT FLD-CCNC: 11 U/L — SIGNIFICANT CHANGE UP (ref 10–45)
ANION GAP SERPL CALC-SCNC: 11 MMOL/L — SIGNIFICANT CHANGE UP (ref 5–17)
ANISOCYTOSIS BLD QL: SLIGHT — SIGNIFICANT CHANGE UP
APTT BLD: 33.7 SEC — SIGNIFICANT CHANGE UP (ref 24.5–35.6)
AST SERPL-CCNC: 26 U/L — SIGNIFICANT CHANGE UP (ref 10–40)
BASOPHILS # BLD AUTO: 0 K/UL — SIGNIFICANT CHANGE UP (ref 0–0.2)
BASOPHILS NFR BLD AUTO: 0 % — SIGNIFICANT CHANGE UP (ref 0–2)
BILIRUB SERPL-MCNC: 0.3 MG/DL — SIGNIFICANT CHANGE UP (ref 0.2–1.2)
BUN SERPL-MCNC: 31 MG/DL — HIGH (ref 7–23)
BURR CELLS BLD QL SMEAR: PRESENT — SIGNIFICANT CHANGE UP
CALCIUM SERPL-MCNC: 10.6 MG/DL — HIGH (ref 8.4–10.5)
CHLORIDE SERPL-SCNC: 109 MMOL/L — HIGH (ref 96–108)
CO2 SERPL-SCNC: 18 MMOL/L — LOW (ref 22–31)
CREAT SERPL-MCNC: 0.99 MG/DL — SIGNIFICANT CHANGE UP (ref 0.5–1.3)
EGFR: 92 ML/MIN/1.73M2 — SIGNIFICANT CHANGE UP
ELLIPTOCYTES BLD QL SMEAR: SLIGHT — SIGNIFICANT CHANGE UP
EOSINOPHIL # BLD AUTO: 0 K/UL — SIGNIFICANT CHANGE UP (ref 0–0.5)
EOSINOPHIL NFR BLD AUTO: 0 % — SIGNIFICANT CHANGE UP (ref 0–6)
GLUCOSE SERPL-MCNC: 141 MG/DL — HIGH (ref 70–99)
HCT VFR BLD CALC: 33.4 % — LOW (ref 39–50)
HGB BLD-MCNC: 10.7 G/DL — LOW (ref 13–17)
INR BLD: 1.4 RATIO — HIGH (ref 0.85–1.18)
LYMPHOCYTES # BLD AUTO: 0.25 K/UL — LOW (ref 1–3.3)
LYMPHOCYTES # BLD AUTO: 5.2 % — LOW (ref 13–44)
MACROCYTES BLD QL: SLIGHT — SIGNIFICANT CHANGE UP
MANUAL SMEAR VERIFICATION: SIGNIFICANT CHANGE UP
MCHC RBC-ENTMCNC: 28.5 PG — SIGNIFICANT CHANGE UP (ref 27–34)
MCHC RBC-ENTMCNC: 32 GM/DL — SIGNIFICANT CHANGE UP (ref 32–36)
MCV RBC AUTO: 89.1 FL — SIGNIFICANT CHANGE UP (ref 80–100)
MONOCYTES # BLD AUTO: 0.43 K/UL — SIGNIFICANT CHANGE UP (ref 0–0.9)
MONOCYTES NFR BLD AUTO: 8.8 % — SIGNIFICANT CHANGE UP (ref 2–14)
NEUTROPHILS # BLD AUTO: 4.21 K/UL — SIGNIFICANT CHANGE UP (ref 1.8–7.4)
NEUTROPHILS NFR BLD AUTO: 86 % — HIGH (ref 43–77)
OVALOCYTES BLD QL SMEAR: SLIGHT — SIGNIFICANT CHANGE UP
PLAT MORPH BLD: ABNORMAL
PLATELET # BLD AUTO: 166 K/UL — SIGNIFICANT CHANGE UP (ref 150–400)
POIKILOCYTOSIS BLD QL AUTO: SIGNIFICANT CHANGE UP
POTASSIUM SERPL-MCNC: 5.3 MMOL/L — SIGNIFICANT CHANGE UP (ref 3.5–5.3)
POTASSIUM SERPL-SCNC: 5.3 MMOL/L — SIGNIFICANT CHANGE UP (ref 3.5–5.3)
PROT SERPL-MCNC: 6.2 G/DL — SIGNIFICANT CHANGE UP (ref 6–8.3)
PROTHROM AB SERPL-ACNC: 15.3 SEC — HIGH (ref 9.5–13)
RBC # BLD: 3.75 M/UL — LOW (ref 4.2–5.8)
RBC # FLD: 14.7 % — HIGH (ref 10.3–14.5)
RBC BLD AUTO: ABNORMAL
SODIUM SERPL-SCNC: 138 MMOL/L — SIGNIFICANT CHANGE UP (ref 135–145)
WBC # BLD: 4.89 K/UL — SIGNIFICANT CHANGE UP (ref 3.8–10.5)
WBC # FLD AUTO: 4.89 K/UL — SIGNIFICANT CHANGE UP (ref 3.8–10.5)

## 2024-02-13 PROCEDURE — 82962 GLUCOSE BLOOD TEST: CPT

## 2024-02-13 PROCEDURE — 85025 COMPLETE CBC W/AUTO DIFF WBC: CPT

## 2024-02-13 PROCEDURE — 80053 COMPREHEN METABOLIC PANEL: CPT

## 2024-02-13 PROCEDURE — 85730 THROMBOPLASTIN TIME PARTIAL: CPT

## 2024-02-13 PROCEDURE — 99284 EMERGENCY DEPT VISIT MOD MDM: CPT

## 2024-02-13 PROCEDURE — 85610 PROTHROMBIN TIME: CPT

## 2024-02-13 RX ORDER — OXYMETAZOLINE HYDROCHLORIDE 0.5 MG/ML
2 SPRAY NASAL ONCE
Refills: 0 | Status: COMPLETED | OUTPATIENT
Start: 2024-02-13 | End: 2024-02-13

## 2024-02-13 RX ADMIN — OXYMETAZOLINE HYDROCHLORIDE 2 SPRAY(S): 0.5 SPRAY NASAL at 17:09

## 2024-02-13 NOTE — ED ADULT NURSE NOTE - NSFALLUNIVINTERV_ED_ALL_ED
Bed/Stretcher in lowest position, wheels locked, appropriate side rails in place/Call bell, personal items and telephone in reach/Instruct patient to call for assistance before getting out of bed/chair/stretcher/Non-slip footwear applied when patient is off stretcher/[place] to call system/Physically safe environment - no spills, clutter or unnecessary equipment/Purposeful proactive rounding/Room/bathroom lighting operational, light cord in reach

## 2024-02-13 NOTE — ED PROVIDER NOTE - PATIENT PORTAL LINK FT
You can access the FollowMyHealth Patient Portal offered by [facility] by registering at the following website: [email or web address]. By joining [employer]’s FollowMyHealth portal, you will also be able to view your health information using other applications (apps) compatible with our system.

## 2024-02-13 NOTE — ED PROVIDER NOTE - CARE PROVIDER_API CALL
Clarissa Sinha  Phone: [phone]  Fax: (   )    -  Established Patient  Follow Up Time: 4-6 Days

## 2024-02-13 NOTE — ED PROVIDER NOTE - PHYSICAL EXAMINATION
Vital signs reviewed.  CONSTITUTIONAL: NAD, awake  HEAD: Normocephalic; atraumatic  EYES: EOMI, no conjunctival injection, no scleral icterus  MOUTH/THROAT:  MMM; blood clot in L nostril, no active bleeding   NECK: Trachea midline  CV: Normal S1, S2; no audible murmurs; extremities WWP  RESP: normal work of breathing; CTAB, no stridor  ABD: soft, non-distended; non-tender  : Deferred  MSK/EXT: no edema, no limited ROM   SKIN: No rashes on exposed skin surfaces  NEURO: Moves all extremities spontaneously with no focal deficits, speech is appropriate

## 2024-02-13 NOTE — ED PROVIDER NOTE - PROGRESS NOTE DETAILS
Renuka PGY-1: pt reassessed.  Epitaxis stopped. Updated on lab results. Agreeable to follow up with PCP. Given strict return precautions Renuka PGY-1: pt reassessed.  Epistaxis stopped. Updated on lab results. Agreeable to follow up with PCP. Given strict return precautions

## 2024-02-13 NOTE — ED PROVIDER NOTE - PROVIDER TOKENS
FREE:[LAST:[Bharath],FIRST:[Clarissa],PHONE:[phone]],FAX:[(   )    -],FOLLOWUP:[4-6 Days],ESTABLISHEDPATIENT:[T]]

## 2024-02-13 NOTE — ED PROVIDER NOTE - OBJECTIVE STATEMENT
53-year-old male history of renal transplant, DVT on Eliquis, T2DM, L diabetic foot wound on daily hyperbaric tx, hx of CABG p/w epitaxis.   Patient report he was sitting in the car, blowing his nose, when the epi taxis started.  He was unable to stop it with compression.  Prompting ED visit.  Denies lightheadedness, headache, nausea, cough.

## 2024-02-13 NOTE — ED ADULT NURSE NOTE - OBJECTIVE STATEMENT
52 y/o male complaining of a nose bleed that started this afternoon. Pt states he was leaving his hyperbaric wound appointment and tried to clear his ears from pressure in his car when his nose started bleeding. Pt is A&ox4, breathing spontaneously, unlabored. Pt notes that bleed was persistent for 20-25 minutes. Pt states he is on eliquis and wanted to be seen just in case it did not stop. pt has pmh of foot wounds, Kidney transplant, HTN, diabetes, quadruple bypass and an allergy to contrast dye. Pt denies chest pain, SOB, N/V/D or blood in his stool or urine. Pt endorses previous nose bleeds in the past from similar events. pt lungs clear and equal bilaterally, abdomen soft, nontender, nondistended x 4 quadrants. Pt negative for peripheral edema. At time of RN assessment and exam there is only dried blood no active bleeding. Pt instructed to call when he needs to get out of bed for safety,

## 2024-02-13 NOTE — ED PROVIDER NOTE - CLINICAL SUMMARY MEDICAL DECISION MAKING FREE TEXT BOX
53-year-old male history of renal transplant, DVT on Eliquis, T2DM, L diabetic foot wound on daily hyperbaric tx, hx of CABG p/w epitaxis. VX wnl. Exam well appearing, blood clot in L nostril. S1S2 no murmur, CTAB. Abd soft nondistended no TTP. L foot diabetic wound dressed, in hard sole shoe. Will obtain  labs, give afrin. Dispo likely home 53-year-old male history of renal transplant, DVT on Eliquis, T2DM, L diabetic foot wound on daily hyperbaric tx, hx of CABG p/w epitaxis. VX wnl. Exam well appearing, blood clot in L nostril. S1S2 no murmur, CTAB. Abd soft nondistended no TTP. L foot diabetic wound dressed, in hard sole shoe. Will obtain  labs, give afrin. Dispo likely home  ZR

## 2024-02-13 NOTE — ED PROVIDER NOTE - NSFOLLOWUPINSTRUCTIONS_ED_ALL_ED_FT
Nosebleeds (epistaxis) can be very common. The lining or mucosa of the nasal tissue contains many blood vessels that may bleed easily. A common cause of nosebleeds is dry air. This dries the nasal membranes and makes the lining of the nose more susceptible to bleeding. Vigorous nose blowing and picking the  nose can also cause nosebleeds.  Nosebleeds are classified into two different types.    Anterior nose bleed. Most nosebleeds begin in the lower part of the septum. Nose bleeds in children are almost always the anterior type.  Posterior nose bleed. Less commonly, a nosebleed may begin high and deep within the nose and flow down the back of the throat. Posterior nosebleeds are often more severe, requiring a physician's care. They tend to occur more in individuals with high blood pressure or after trauma to the nose or face.    Medical conditions that may contribute to nosebleeds are allergies, sinusitis, and a deviated septum. Blood thinners, such as coumadin (warfarin), heparin, and aspirin and aspirin-containing products can worsen nasal bleeding. Clotting disorders, fractures of the nose, and rarely, tumors have to be considered.    To stop an anterior nose bleed:  1) Help the patient or child stay calm. A person who is agitated may bleed more profusely than someone who's been reassured and supported. Move indoors where it is cool and comfortable. Anxiety and outdoor  heat contribute to bleeding.   2) Pinch all the soft parts of the nose together between the thumb and the side of the index finger.  3) A cotton ball soaked in Afrin or beatrice-synephrine may be placed into the nostril.  4) Press firmly, but gently, the thumb and index finger toward the face, compressing the pinched parts of the nose against the bones of the face.  5) Hold the position for 5-10 minutes.  6) Head should be elevated, above the heart.  7) Ice can also be placed toward the nose and cheeks. Cold causes blood vessels to constrict thereby decreasing bleeding.  If you do experience additional bleeding, the following maneuver could help:  • Completely evacuate your nose of blood clots by blowing into a sink or Kleenexes and apply 3 to 4 sprays of topical decongestant such as Afrin to each nostril.  • Call the doctor if bleeding persists.    During periods of nosebleeds (epistaxis) there are several precautions you should employ for at least 2 weeks to allow healing of your nasal membranes:  • Avoid all vigorous activity. This includes jogging, lifting, yard work, aerobics, contact sports, or any activity that causes your pulse and blood pressure to increase.  • Avoid warm liquids, warm foods, or spicy foods. Heat causes blood vessels to dilate, thereby precipitating bleeding.  • Avoid hot showers and baths. Hot tubs and saunas are off limits.  • Avoid medicines that alter the ability of your blood to clot normally. This includes over the counter medicines such as aspirin, ibuprofen, Advil, Motrin, Anaprox, Naprosyn, Orudis, Nuprin, Ecotrin, and other nonsteroidal anti-inflammatories. Tylenol (acetaminophen) products are acceptable.  • Do not blow your nose, as this may forcibly disrupt clots along your nasal membranes. Sneeze through an open mouth.  • Avoid all trauma to your nose.  • Smoking is discouraged.  • Try to use a humidifier in the room in which you sleep or work  • Keep your head elevated at night while sleeping. A lubricating ointment (bacitracin, A and D ointment, polysporin) may be placed along the septum several times each day and before bedtime.      Please follow up with your primary care physician within 5 days.

## 2024-02-14 ENCOUNTER — APPOINTMENT (OUTPATIENT)
Dept: HYPERBARIC MEDICINE | Facility: CLINIC | Age: 54
End: 2024-02-14

## 2024-02-14 DIAGNOSIS — M79.606 PAIN IN LEG, UNSPECIFIED: ICD-10-CM

## 2024-02-15 ENCOUNTER — EMERGENCY (EMERGENCY)
Facility: HOSPITAL | Age: 54
LOS: 1 days | Discharge: HOME | End: 2024-02-15
Attending: STUDENT IN AN ORGANIZED HEALTH CARE EDUCATION/TRAINING PROGRAM
Payer: MEDICARE

## 2024-02-15 ENCOUNTER — OUTPATIENT (OUTPATIENT)
Dept: OUTPATIENT SERVICES | Facility: HOSPITAL | Age: 54
LOS: 1 days | End: 2024-02-15
Payer: MEDICARE

## 2024-02-15 ENCOUNTER — APPOINTMENT (OUTPATIENT)
Dept: HYPERBARIC MEDICINE | Facility: CLINIC | Age: 54
End: 2024-02-15
Payer: MEDICARE

## 2024-02-15 VITALS
OXYGEN SATURATION: 97 % | HEART RATE: 76 BPM | SYSTOLIC BLOOD PRESSURE: 155 MMHG | TEMPERATURE: 98 F | DIASTOLIC BLOOD PRESSURE: 60 MMHG | RESPIRATION RATE: 18 BRPM

## 2024-02-15 VITALS
SYSTOLIC BLOOD PRESSURE: 122 MMHG | HEART RATE: 78 BPM | OXYGEN SATURATION: 98 % | DIASTOLIC BLOOD PRESSURE: 48 MMHG | TEMPERATURE: 97.8 F | RESPIRATION RATE: 16 BRPM

## 2024-02-15 VITALS
HEART RATE: 78 BPM | DIASTOLIC BLOOD PRESSURE: 84 MMHG | OXYGEN SATURATION: 98 % | SYSTOLIC BLOOD PRESSURE: 165 MMHG | RESPIRATION RATE: 16 BRPM

## 2024-02-15 VITALS
RESPIRATION RATE: 20 BRPM | SYSTOLIC BLOOD PRESSURE: 148 MMHG | DIASTOLIC BLOOD PRESSURE: 79 MMHG | OXYGEN SATURATION: 100 % | WEIGHT: 160.06 LBS | HEIGHT: 69 IN | HEART RATE: 94 BPM

## 2024-02-15 DIAGNOSIS — L97.522 NON-PRESSURE CHRONIC ULCER OF OTHER PART OF LEFT FOOT WITH FAT LAYER EXPOSED: ICD-10-CM

## 2024-02-15 DIAGNOSIS — Z95.810 PRESENCE OF AUTOMATIC (IMPLANTABLE) CARDIAC DEFIBRILLATOR: Chronic | ICD-10-CM

## 2024-02-15 DIAGNOSIS — M86.331 CHRONIC MULTIFOCAL OSTEOMYELITIS, RIGHT RADIUS AND ULNA: ICD-10-CM

## 2024-02-15 DIAGNOSIS — Z89.422 ACQUIRED ABSENCE OF OTHER LEFT TOE(S): Chronic | ICD-10-CM

## 2024-02-15 DIAGNOSIS — M79.606 PAIN IN LEG, UNSPECIFIED: ICD-10-CM

## 2024-02-15 DIAGNOSIS — Z95.1 PRESENCE OF AORTOCORONARY BYPASS GRAFT: Chronic | ICD-10-CM

## 2024-02-15 DIAGNOSIS — E11.621 TYPE 2 DIABETES MELLITUS WITH FOOT ULCER: ICD-10-CM

## 2024-02-15 LAB
BASOPHILS # BLD AUTO: 0.04 K/UL — SIGNIFICANT CHANGE UP (ref 0–0.2)
BASOPHILS NFR BLD AUTO: 0.8 % — SIGNIFICANT CHANGE UP (ref 0–2)
ELLIPTOCYTES BLD QL SMEAR: SLIGHT — SIGNIFICANT CHANGE UP
EOSINOPHIL # BLD AUTO: 0 K/UL — SIGNIFICANT CHANGE UP (ref 0–0.5)
EOSINOPHIL NFR BLD AUTO: 0 % — SIGNIFICANT CHANGE UP (ref 0–6)
HCT VFR BLD CALC: 33.3 % — LOW (ref 39–50)
HGB BLD-MCNC: 10.6 G/DL — LOW (ref 13–17)
LYMPHOCYTES # BLD AUTO: 0.34 K/UL — LOW (ref 1–3.3)
LYMPHOCYTES # BLD AUTO: 6.1 % — LOW (ref 13–44)
MANUAL SMEAR VERIFICATION: SIGNIFICANT CHANGE UP
MCHC RBC-ENTMCNC: 28.6 PG — SIGNIFICANT CHANGE UP (ref 27–34)
MCHC RBC-ENTMCNC: 31.8 GM/DL — LOW (ref 32–36)
MCV RBC AUTO: 90 FL — SIGNIFICANT CHANGE UP (ref 80–100)
MONOCYTES # BLD AUTO: 0.49 K/UL — SIGNIFICANT CHANGE UP (ref 0–0.9)
MONOCYTES NFR BLD AUTO: 8.7 % — SIGNIFICANT CHANGE UP (ref 2–14)
NEUTROPHILS # BLD AUTO: 4.73 K/UL — SIGNIFICANT CHANGE UP (ref 1.8–7.4)
NEUTROPHILS NFR BLD AUTO: 83.5 % — HIGH (ref 43–77)
NEUTS BAND # BLD: 0.9 % — SIGNIFICANT CHANGE UP (ref 0–8)
OVALOCYTES BLD QL SMEAR: SLIGHT — SIGNIFICANT CHANGE UP
PLAT MORPH BLD: NORMAL — SIGNIFICANT CHANGE UP
PLATELET # BLD AUTO: 163 K/UL — SIGNIFICANT CHANGE UP (ref 150–400)
POIKILOCYTOSIS BLD QL AUTO: SLIGHT — SIGNIFICANT CHANGE UP
RBC # BLD: 3.7 M/UL — LOW (ref 4.2–5.8)
RBC # FLD: 14.6 % — HIGH (ref 10.3–14.5)
RBC BLD AUTO: ABNORMAL
WBC # BLD: 5.6 K/UL — SIGNIFICANT CHANGE UP (ref 3.8–10.5)
WBC # FLD AUTO: 5.6 K/UL — SIGNIFICANT CHANGE UP (ref 3.8–10.5)

## 2024-02-15 PROCEDURE — G0277: CPT

## 2024-02-15 PROCEDURE — 99284 EMERGENCY DEPT VISIT MOD MDM: CPT

## 2024-02-15 PROCEDURE — 99183 HYPERBARIC OXYGEN THERAPY: CPT

## 2024-02-15 PROCEDURE — 85025 COMPLETE CBC W/AUTO DIFF WBC: CPT

## 2024-02-15 PROCEDURE — 82962 GLUCOSE BLOOD TEST: CPT

## 2024-02-15 RX ORDER — OXYMETAZOLINE HYDROCHLORIDE 0.5 MG/ML
2 SPRAY NASAL ONCE
Refills: 0 | Status: COMPLETED | OUTPATIENT
Start: 2024-02-15 | End: 2024-02-15

## 2024-02-15 RX ORDER — ATORVASTATIN CALCIUM 40 MG/1
40 TABLET, FILM COATED ORAL
Qty: 30 | Refills: 11 | Status: ACTIVE | COMMUNITY
Start: 2023-07-17 | End: 1900-01-01

## 2024-02-15 RX ADMIN — OXYMETAZOLINE HYDROCHLORIDE 2 SPRAY(S): 0.5 SPRAY NASAL at 20:37

## 2024-02-15 NOTE — ED ADULT TRIAGE NOTE - CHIEF COMPLAINT QUOTE
nosebleed x  1 hour  received hyperbaric oxygen wound care treatment today for a wound to the left leg  Hx kidney transplant in July 2023  on Eliquis and aspirin

## 2024-02-15 NOTE — ED ADULT NURSE NOTE - OBJECTIVE STATEMENT
54 y/o male presents to the ED endorsing epistaxis out of L nare x1.5 hours. A/Ox4. Ambulatory with a cane at baseline. PMH: Renal transplant, CAD, T2DM and BL Chronic foot wounds treated with hyperbaric. Patient endorses recent occurrence of epistaxis on Tuesday following being in the snow and "having the heat on in the car". Patient denies nausea, vomiting, diarrhea, fever, cough, chills, chest pain, dyspnea, lightheadedness and chills. Safety and comfort provided. 52 y/o male presents to the ED endorsing epistaxis out of L nare x1.5 hours. A/Ox4. Ambulatory with a cane at baseline. PMH: Renal transplant, CAD, T2DM and BL Chronic foot wounds treated with hyperbaric. Patient endorses recent occurrence of epistaxis on Tuesday following being in the snow and "having the heat on in the car". Patient denies nausea, vomiting, diarrhea, fever, cough, chills, chest pain, dyspnea, lightheadedness and chills. Upon assessment, BL dried blood in nare with no active bleeding at this time. Safety and comfort provided.

## 2024-02-15 NOTE — ED ADULT NURSE NOTE - NSHOSCREENINGQ1_ED_ALL_ED
Subjective


Remarks


Resting comfortably in bed


No event overnight


Denied chest and or short of breath


No fever or chills





Objective


Vitals





Vital Signs








  Date Time  Temp Pulse Resp B/P (MAP) Pulse Ox O2 Delivery O2 Flow Rate FiO2


 


10/30/17 15:55 95.4 65 18 126/61 (82) 94   


 


10/30/17 12:00 96.1 57 17 127/54 (78) 95   


 


10/30/17 08:00 96.9 61 19 127/63 (84) 95   


 


10/30/17 05:23 96.5 62 18 151/69 (96) 95   


 


10/29/17 23:47 96.7 63 18 165/80 (108) 92   


 


10/29/17 20:27 97.6 92 18 164/81 (108) 94   


 


10/29/17 20:07  55      














I/O      


 


 10/29/17 10/29/17 10/29/17 10/30/17 10/30/17 10/30/17





 07:00 15:00 23:00 07:00 15:00 23:00


 


Intake Total 580 ml   680 ml  


 


Balance 580 ml   680 ml  


 


      


 


Intake Oral 580 ml   680 ml  


 


# Voids 4   4  








Result Diagram:  


10/30/17 0557                                                                  

              10/29/17 0726





Objective Remarks


GENERAL: This is a well-nourished, well-developed patient, in no apparent 

distress.


SKIN: No rashes, warm and dry 


HEAD: Atraumatic. Normocephalic. 


EYES: Pupils equal round and reactive. Extraocular motions intact. No scleral 

icterus. 


ENT: Nose without bleeding, or drainage, Airway patent.


NECK: Trachea midline.  Supple


CARDIOVASCULAR: Regular rate and rhythm without murmurs, gallops, or rubs. 


RESPIRATORY: Fair air entry bilaterally. No wheezes, rales, or rhonchi.  


GASTROINTESTINAL: Abdomen soft, non-tender, nondistended.  Positive bowel sounds


MUSCULOSKELETAL: Extremities without clubbing, cyanosis, or edema.  Pedal 

pulses appreciated


NEUROLOGICAL: Awake and alert.  Moves all extremity.  Normal speech.no focal 

neurological deficit





A/P


Problem List:  


(1) DVT (deep venous thrombosis)


ICD Code:  I82.409 - Acute embolism and thrombosis of unspecified deep veins of 

unspecified lower extremity


Status:  Acute


(2) CKD (chronic kidney disease)


ICD Code:  N18.9 - Chronic kidney disease, unspecified


(3) Hypothyroid


ICD Code:  E03.9 - Hypothyroidism, unspecified


Status:  Chronic


(4) Type 2 diabetes mellitus


ICD Code:  E11.9 - Type 2 diabetes mellitus


Status:  Chronic


Assessment and Plan


Right lower extremity DVT


Lower extremity edema mostly due to renal failure


SLE / with glomerulonephritis- s/p kidney biopsy;


            FOCAL PROLIFERATIVE AND NECROTIZING GLOMERULONEPHRITIS.  


   NODULAR DIABETIC GLOMERULOSCLEROSIS.  


   GLOBAL& SEGMENTAL GLOMERULOSCLEROSIS.  


   INTERSTITIAL FIBROSIS AND TUBULAR ATROPHY, SEVERE.


VALENTIN on CKD due to above mostly due to glomerulonephritis nothing to suggest CHF(

BNP and 2-D echo negative)


Diabetes mellitus


History pancreatitis


DVT prophylaxis on Coumadin and heparin drip





Plan:





Continue heparin drip and Coumadin bridging


Avoid nephrotoxin, monitor BMP


BNP is normal, 2-D echo reviewed by me unremarkable for cardiomyopathy


I will hold Lasix and repeat BMP in a.m. we'll consider nephrology consultation


Strict monitoring of I's and O's


Daily PT/INR





Problem Qualifiers





(1) DVT (deep venous thrombosis):  


Qualified Codes:  I82.492 - Acute embolism and thrombosis of other specified 

deep vein of left lower extremity


(2) CKD (chronic kidney disease):  


Qualified Codes:  N18.4 - Chronic kidney disease, stage 4 (severe)


(3) Hypothyroid:  


Qualified Codes:  E03.9 - Hypothyroidism, unspecified


(4) Type 2 diabetes mellitus:  


Qualified Codes:  E11.9 - Type 2 diabetes mellitus without complications; Z79.4 

- Long term (current) use of insulin








Shai Vasquez MD Oct 30, 2017 17:36 No

## 2024-02-15 NOTE — ED ADULT NURSE NOTE - NSFALLRISKINTERV_ED_ALL_ED

## 2024-02-15 NOTE — ED PROVIDER NOTE - PATIENT PORTAL LINK FT
You can access the FollowMyHealth Patient Portal offered by [facility] by registering at the following website: [email or web address]. By joining [employer]’s FollowMyHealth portal, you will also be able to view your health information using other applications (apps) compatible with our system.

## 2024-02-15 NOTE — ED ADULT NURSE REASSESSMENT NOTE - NS ED NURSE REASSESS COMMENT FT1
Patient noted to have bleeding out of L nare. Gauze applied to L nare with pressure. MD Aburto made aware. Afrin to be ordered. RN awaiting new orders.

## 2024-02-15 NOTE — ED PROVIDER NOTE - NSFOLLOWUPCLINICS_GEN_ALL_ED_FT
[facility] - ENT  Otolaryngology (ENT)  [address] [place] [address]  Phone: [phone]  Fax:

## 2024-02-15 NOTE — ED PROVIDER NOTE - CLINICAL SUMMARY MEDICAL DECISION MAKING FREE TEXT BOX
52 yo M renal transplant, DVT on Eliquis, T2DM, L diabetic foot wound on daily hyperbaric tx, hx of CABG p/w epitaxis, HDS no ongoing bleeding, will check cbc monitor for 1 hr dc with epistaxis precautions. Will reach transplant nephro about dc-ing eliquis.

## 2024-02-15 NOTE — PROCEDURE
[Outpatient] : Outpatient [Cane] : cane [THIS CHAMBER HAS BEEN CLEANED / DISINFECTED] : This chamber has been cleaned / disinfected according to local and hospital policy and procedure prior to this treatment. [____] : Post-Dive: Time - [unfilled] [___] : Post-Dive: Value - [unfilled] mg/dL [I have examined and evaluated this patient today, including ears and lungs and have cleared the patient] : I have examined and evaluated this patient today, including ears and lungs and have cleared the patient to continue HBOT as prescribed.  [I have ordered 1 HBOT session at the indicated protocol as seen below] : I have ordered 1 HBOT session at the indicated protocol as seen below [Patient demonstrated and verbalized proper technique for using air break mask] : Patient demonstrated and verbalized proper technique for using air break mask [Patient educated on the risks of SMOKING prior to HBOT with understanding] : Patient educated on the risks of SMOKING prior to HBOT with understanding [Patient educated on the risks of CONSUMING ALCOHOL prior to HBOT with understanding] : Patient educated on the risks of CONSUMING ALCOHOL prior to HBOT with understanding [100% Cotton] : 100% cotton [Empty all pockets] : empty all pockets [No hair oils, wigs, hairpieces, pins] : no hair oils, wigs, hairpieces, pins  [Pre tx medications] : pre tx medications  [No make-up, creams] : no make-up, creams  [No jewelry] : no jewelry  [No matches, cigarettes, lighters] : no matches, cigarettes, lighters  [Hearing aid removed] : hearing aid removed [Dentures removed] : dentures removed [Ground bracelet on pt's wrist] : ground bracelet on pt's wrist  [Contacts removed] : contacts removed  [Remove nail polish] : remove nail polish  [No reading material] : no reading material  [Bra, undergarments removed] : bra, undergarments removed  [No contraindicated dressings] : no contraindicated dressings [Ground Wire - VISUAL Verification - Intact/Free of Obstruction] : Ground Wire - VISUAL Verification - Intact/Free of Obstruction  [Ground Continuity - Verified < 1 ohm w/ Wrist Strap Moises] : Ground Continuity - Verified < 1 ohm w/ Wrist Strap Moises [Diagnosis: ___] : Diagnosis: [unfilled] [Number: ___] : Number: [unfilled] [Clear all fields] : clear all fields [] : No [FreeTextEntry1] : 2.0 jevon [FreeTextEntry3] : 90 min [FreeTextEntry5] : 1200 [FreeTextEntry7] : 1215 [FreeTextEntry9] : 6869 [de-identified] : [id number] [de-identified] : 110 min

## 2024-02-15 NOTE — ED PROVIDER NOTE - NSFOLLOWUPINSTRUCTIONS_ED_ALL_ED_FT
You were seen for epistaxis.  Please stop taking your Eliquis as per your nephrologist.  Please use 2 sprays of Afrin in the affected nostril if you begin bleeding again and hold pressure over the bridge of the nose.  Use a saline nasal spray to keep your nose hydrated throughout the day.  Return to the ER if you have ongoing bleeding with shortness of breath, lightheadedness, chest pain.  Please follow-up with the ENT at the number below if you have repeated episodes of nosebleeds.

## 2024-02-15 NOTE — ED PROVIDER NOTE - PROGRESS NOTE DETAILS
Reached nephro transplant - Dr. Hairston would like to dc eliquis at this time    Shalini Abutro MD, PGY3 Patient had some oozing of blood in left nares, given 2 sprays afrin, resolved now. Hgb stable.    Shalini Aburto MD, PGY3

## 2024-02-15 NOTE — ED PROVIDER NOTE - OBJECTIVE STATEMENT
52 yo M pmhx of renal transplant, DVT on Eliquis, T2DM, L diabetic foot wound on daily hyperbaric tx, hx of CABG p/w epitaxis since hyperbaric therapy at 4 pm today with no symptoms of lightheadedness, cp, sob. No bleeding currently in nose or throat.

## 2024-02-15 NOTE — ED PROVIDER NOTE - ATTENDING CONTRIBUTION TO CARE
History and physical as documented above.  No active bleeding. Will speak with RT regarding d/c'ing eliquis as planned. Will check CBC. symptom management, disposition pending work-up and response to treatment.

## 2024-02-16 ENCOUNTER — OUTPATIENT (OUTPATIENT)
Dept: OUTPATIENT SERVICES | Facility: HOSPITAL | Age: 54
LOS: 1 days | End: 2024-02-16
Payer: MEDICARE

## 2024-02-16 ENCOUNTER — APPOINTMENT (OUTPATIENT)
Dept: WOUND CARE | Facility: HOSPITAL | Age: 54
End: 2024-02-16
Payer: MEDICARE

## 2024-02-16 ENCOUNTER — APPOINTMENT (OUTPATIENT)
Dept: HYPERBARIC MEDICINE | Facility: CLINIC | Age: 54
End: 2024-02-16
Payer: MEDICARE

## 2024-02-16 VITALS
DIASTOLIC BLOOD PRESSURE: 83 MMHG | SYSTOLIC BLOOD PRESSURE: 166 MMHG | RESPIRATION RATE: 16 BRPM | HEART RATE: 79 BPM | OXYGEN SATURATION: 99 %

## 2024-02-16 VITALS
HEART RATE: 94 BPM | DIASTOLIC BLOOD PRESSURE: 48 MMHG | OXYGEN SATURATION: 99 % | SYSTOLIC BLOOD PRESSURE: 117 MMHG | TEMPERATURE: 97.2 F | RESPIRATION RATE: 16 BRPM

## 2024-02-16 DIAGNOSIS — Z89.422 ACQUIRED ABSENCE OF OTHER LEFT TOE(S): Chronic | ICD-10-CM

## 2024-02-16 DIAGNOSIS — Z95.1 PRESENCE OF AORTOCORONARY BYPASS GRAFT: Chronic | ICD-10-CM

## 2024-02-16 DIAGNOSIS — M79.606 PAIN IN LEG, UNSPECIFIED: ICD-10-CM

## 2024-02-16 DIAGNOSIS — Z95.810 PRESENCE OF AUTOMATIC (IMPLANTABLE) CARDIAC DEFIBRILLATOR: Chronic | ICD-10-CM

## 2024-02-16 DIAGNOSIS — L97.522 NON-PRESSURE CHRONIC ULCER OF OTHER PART OF LEFT FOOT WITH FAT LAYER EXPOSED: ICD-10-CM

## 2024-02-16 DIAGNOSIS — E11.621 TYPE 2 DIABETES MELLITUS WITH FOOT ULCER: ICD-10-CM

## 2024-02-16 DIAGNOSIS — M86.331 CHRONIC MULTIFOCAL OSTEOMYELITIS, RIGHT RADIUS AND ULNA: ICD-10-CM

## 2024-02-16 PROCEDURE — 82962 GLUCOSE BLOOD TEST: CPT

## 2024-02-16 PROCEDURE — 11042 DBRDMT SUBQ TIS 1ST 20SQCM/<: CPT

## 2024-02-16 PROCEDURE — 99183 HYPERBARIC OXYGEN THERAPY: CPT

## 2024-02-16 PROCEDURE — G0277: CPT

## 2024-02-16 NOTE — HISTORY OF PRESENT ILLNESS
[FreeTextEntry1] : Patient presents with chief complaint of non-healing ulcer 1 metatarsal head left foot +PVD seeing Dr. Malone Right foot ulcer and surgery is completely healed. Non-healing wound left foot with osteomyelitis +DM with PVD Fibrogranular tissue medial 1 metatarsal head with poor healing potential left foot Right foot is completely healed with no signs of infection or wounds right foot PMH: Kidney disease replacement transplant CAD CABGx3 Exposed bone medial 1 metatarsal left foot Patient put on cipro for chronic OM by Dr. Moore ID No ascending cellulitis decreased edema 1 met left foot no erythema chronic osteomyelitis left foot patient is improving with HBO returns s/p apligraf

## 2024-02-16 NOTE — PROCEDURE
[Outpatient] : Outpatient [Cane] : cane [THIS CHAMBER HAS BEEN CLEANED / DISINFECTED] : This chamber has been cleaned / disinfected according to local and hospital policy and procedure prior to this treatment. [____] : Post-Dive: Time - [unfilled] [___] : Post-Dive: Value - [unfilled] mg/dL [I have examined and evaluated this patient today, including ears and lungs and have cleared the patient] : I have examined and evaluated this patient today, including ears and lungs and have cleared the patient to continue HBOT as prescribed.  [I have ordered 1 HBOT session at the indicated protocol as seen below] : I have ordered 1 HBOT session at the indicated protocol as seen below [Patient demonstrated and verbalized proper technique for using air break mask] : Patient demonstrated and verbalized proper technique for using air break mask [Patient educated on the risks of SMOKING prior to HBOT with understanding] : Patient educated on the risks of SMOKING prior to HBOT with understanding [Patient educated on the risks of CONSUMING ALCOHOL prior to HBOT with understanding] : Patient educated on the risks of CONSUMING ALCOHOL prior to HBOT with understanding [100% Cotton] : 100% cotton [Empty all pockets] : empty all pockets [No hair oils, wigs, hairpieces, pins] : no hair oils, wigs, hairpieces, pins  [Pre tx medications] : pre tx medications  [No make-up, creams] : no make-up, creams  [No jewelry] : no jewelry  [No matches, cigarettes, lighters] : no matches, cigarettes, lighters  [Hearing aid removed] : hearing aid removed [Dentures removed] : dentures removed [Ground bracelet on pt's wrist] : ground bracelet on pt's wrist  [Contacts removed] : contacts removed  [Remove nail polish] : remove nail polish  [No reading material] : no reading material  [Bra, undergarments removed] : bra, undergarments removed  [No contraindicated dressings] : no contraindicated dressings [Ground Wire - VISUAL Verification - Intact/Free of Obstruction] : Ground Wire - VISUAL Verification - Intact/Free of Obstruction  [Ground Continuity - Verified < 1 ohm w/ Wrist Strap Moises] : Ground Continuity - Verified < 1 ohm w/ Wrist Strap Moises [Diagnosis: ___] : Diagnosis: [unfilled] [Number: ___] : Number: [unfilled] [Clear all fields] : clear all fields [] : No [FreeTextEntry1] : 2.0 jevon [FreeTextEntry3] : 90 min [FreeTextEntry5] : 2106 [FreeTextEntry7] : 1100 [FreeTextEntry9] : 2589 [de-identified] : [id number] [de-identified] : 110 min

## 2024-02-20 ENCOUNTER — APPOINTMENT (OUTPATIENT)
Dept: HYPERBARIC MEDICINE | Facility: CLINIC | Age: 54
End: 2024-02-20
Payer: MEDICARE

## 2024-02-20 ENCOUNTER — OUTPATIENT (OUTPATIENT)
Dept: OUTPATIENT SERVICES | Facility: HOSPITAL | Age: 54
LOS: 1 days | End: 2024-02-20
Payer: MEDICARE

## 2024-02-20 VITALS
TEMPERATURE: 97.4 F | HEART RATE: 94 BPM | DIASTOLIC BLOOD PRESSURE: 73 MMHG | SYSTOLIC BLOOD PRESSURE: 151 MMHG | RESPIRATION RATE: 16 BRPM | OXYGEN SATURATION: 99 %

## 2024-02-20 DIAGNOSIS — Z89.422 ACQUIRED ABSENCE OF OTHER LEFT TOE(S): Chronic | ICD-10-CM

## 2024-02-20 DIAGNOSIS — M86.60 OTHER CHRONIC OSTEOMYELITIS, UNSPECIFIED SITE: ICD-10-CM

## 2024-02-20 DIAGNOSIS — Z95.810 PRESENCE OF AUTOMATIC (IMPLANTABLE) CARDIAC DEFIBRILLATOR: Chronic | ICD-10-CM

## 2024-02-20 DIAGNOSIS — E11.621 TYPE 2 DIABETES MELLITUS WITH FOOT ULCER: ICD-10-CM

## 2024-02-20 DIAGNOSIS — L97.509 NON-PRESSURE CHRONIC ULCER OF OTHER PART OF UNSPECIFIED FOOT WITH UNSPECIFIED SEVERITY: ICD-10-CM

## 2024-02-20 DIAGNOSIS — L97.524 NON-PRESSURE CHRONIC ULCER OF OTHER PART OF LEFT FOOT WITH NECROSIS OF BONE: ICD-10-CM

## 2024-02-20 PROCEDURE — G0277: CPT

## 2024-02-20 PROCEDURE — 82962 GLUCOSE BLOOD TEST: CPT

## 2024-02-20 PROCEDURE — 99183 HYPERBARIC OXYGEN THERAPY: CPT

## 2024-02-20 NOTE — PROCEDURE
[Outpatient] : Outpatient [Cane] : cane [THIS CHAMBER HAS BEEN CLEANED / DISINFECTED] : This chamber has been cleaned / disinfected according to local and hospital policy and procedure prior to this treatment. [I have examined and evaluated this patient today, including ears and lungs and have cleared the patient] : I have examined and evaluated this patient today, including ears and lungs and have cleared the patient to continue HBOT as prescribed.  [I have ordered 1 HBOT session at the indicated protocol as seen below] : I have ordered 1 HBOT session at the indicated protocol as seen below [Patient demonstrated and verbalized proper technique for using air break mask] : Patient demonstrated and verbalized proper technique for using air break mask [Patient educated on the risks of SMOKING prior to HBOT with understanding] : Patient educated on the risks of SMOKING prior to HBOT with understanding [Patient educated on the risks of CONSUMING ALCOHOL prior to HBOT with understanding] : Patient educated on the risks of CONSUMING ALCOHOL prior to HBOT with understanding [100% Cotton] : 100% cotton [Empty all pockets] : empty all pockets [No hair oils, wigs, hairpieces, pins] : no hair oils, wigs, hairpieces, pins  [Pre tx medications] : pre tx medications  [No make-up, creams] : no make-up, creams  [No jewelry] : no jewelry  [No matches, cigarettes, lighters] : no matches, cigarettes, lighters  [Hearing aid removed] : hearing aid removed [Dentures removed] : dentures removed [Ground bracelet on pt's wrist] : ground bracelet on pt's wrist  [Contacts removed] : contacts removed  [Remove nail polish] : remove nail polish  [No reading material] : no reading material  [Bra, undergarments removed] : bra, undergarments removed  [No contraindicated dressings] : no contraindicated dressings [Ground Wire - VISUAL Verification - Intact/Free of Obstruction] : Ground Wire - VISUAL Verification - Intact/Free of Obstruction  [Ground Continuity - Verified < 1 ohm w/ Wrist Strap Moises] : Ground Continuity - Verified < 1 ohm w/ Wrist Strap Moises [Clear all fields] : clear all fields [Diagnosis: ___] : Diagnosis: [unfilled] [___] : Post-Dive: Value - [unfilled] mg/dL [____] : Post-Dive: Time - [unfilled] [Number: ___] : Number: [unfilled] [] : No [FreeTextEntry1] : 2.0 jevon [FreeTextEntry3] : 90 min [FreeTextEntry5] : 0314 [FreeTextEntry7] : 5819 [FreeTextEntry9] : 0289 [de-identified] : 110 min [de-identified] : 0983

## 2024-02-21 ENCOUNTER — OUTPATIENT (OUTPATIENT)
Dept: OUTPATIENT SERVICES | Facility: HOSPITAL | Age: 54
LOS: 1 days | End: 2024-02-21
Payer: MEDICARE

## 2024-02-21 ENCOUNTER — APPOINTMENT (OUTPATIENT)
Dept: HYPERBARIC MEDICINE | Facility: CLINIC | Age: 54
End: 2024-02-21
Payer: MEDICARE

## 2024-02-21 VITALS
OXYGEN SATURATION: 99 % | DIASTOLIC BLOOD PRESSURE: 71 MMHG | HEART RATE: 85 BPM | TEMPERATURE: 97.2 F | RESPIRATION RATE: 16 BRPM | SYSTOLIC BLOOD PRESSURE: 147 MMHG

## 2024-02-21 DIAGNOSIS — M79.606 PAIN IN LEG, UNSPECIFIED: ICD-10-CM

## 2024-02-21 DIAGNOSIS — Z95.1 PRESENCE OF AORTOCORONARY BYPASS GRAFT: Chronic | ICD-10-CM

## 2024-02-21 PROCEDURE — 99183 HYPERBARIC OXYGEN THERAPY: CPT

## 2024-02-21 PROCEDURE — G0277: CPT

## 2024-02-21 PROCEDURE — 82962 GLUCOSE BLOOD TEST: CPT

## 2024-02-21 NOTE — PROCEDURE
[Outpatient] : Outpatient [Cane] : cane [THIS CHAMBER HAS BEEN CLEANED / DISINFECTED] : This chamber has been cleaned / disinfected according to local and hospital policy and procedure prior to this treatment. [____] : Post-Dive: Time - [unfilled] [___] : Post-Dive: Value - [unfilled] mg/dL [I have examined and evaluated this patient today, including ears and lungs and have cleared the patient] : I have examined and evaluated this patient today, including ears and lungs and have cleared the patient to continue HBOT as prescribed.  [I have ordered 1 HBOT session at the indicated protocol as seen below] : I have ordered 1 HBOT session at the indicated protocol as seen below [Patient demonstrated and verbalized proper technique for using air break mask] : Patient demonstrated and verbalized proper technique for using air break mask [Patient educated on the risks of SMOKING prior to HBOT with understanding] : Patient educated on the risks of SMOKING prior to HBOT with understanding [Patient educated on the risks of CONSUMING ALCOHOL prior to HBOT with understanding] : Patient educated on the risks of CONSUMING ALCOHOL prior to HBOT with understanding [100% Cotton] : 100% cotton [Empty all pockets] : empty all pockets [No hair oils, wigs, hairpieces, pins] : no hair oils, wigs, hairpieces, pins  [Pre tx medications] : pre tx medications  [No make-up, creams] : no make-up, creams  [No jewelry] : no jewelry  [No matches, cigarettes, lighters] : no matches, cigarettes, lighters  [Hearing aid removed] : hearing aid removed [Dentures removed] : dentures removed [Ground bracelet on pt's wrist] : ground bracelet on pt's wrist  [Contacts removed] : contacts removed  [Remove nail polish] : remove nail polish  [No reading material] : no reading material  [Bra, undergarments removed] : bra, undergarments removed  [No contraindicated dressings] : no contraindicated dressings [Ground Wire - VISUAL Verification - Intact/Free of Obstruction] : Ground Wire - VISUAL Verification - Intact/Free of Obstruction  [Ground Continuity - Verified < 1 ohm w/ Wrist Strap Moises] : Ground Continuity - Verified < 1 ohm w/ Wrist Strap Moises [Diagnosis: ___] : Diagnosis: [unfilled] [Clear all fields] : clear all fields [Number: ___] : Number: [unfilled] [] : No [FreeTextEntry1] : 2.0 jevon [FreeTextEntry3] : 90 min [FreeTextEntry5] : 7815 [FreeTextEntry7] : 7034 [FreeTextEntry9] : 0403 [de-identified] : 0948 [de-identified] : 110 min

## 2024-02-22 ENCOUNTER — APPOINTMENT (OUTPATIENT)
Dept: HYPERBARIC MEDICINE | Facility: CLINIC | Age: 54
End: 2024-02-22
Payer: MEDICARE

## 2024-02-22 ENCOUNTER — OUTPATIENT (OUTPATIENT)
Dept: OUTPATIENT SERVICES | Facility: HOSPITAL | Age: 54
LOS: 1 days | End: 2024-02-22
Payer: MEDICARE

## 2024-02-22 VITALS
TEMPERATURE: 97.3 F | SYSTOLIC BLOOD PRESSURE: 162 MMHG | RESPIRATION RATE: 16 BRPM | DIASTOLIC BLOOD PRESSURE: 81 MMHG | OXYGEN SATURATION: 100 % | HEART RATE: 91 BPM

## 2024-02-22 VITALS
OXYGEN SATURATION: 100 % | RESPIRATION RATE: 16 BRPM | DIASTOLIC BLOOD PRESSURE: 63 MMHG | SYSTOLIC BLOOD PRESSURE: 180 MMHG | HEART RATE: 85 BPM

## 2024-02-22 DIAGNOSIS — M79.606 PAIN IN LEG, UNSPECIFIED: ICD-10-CM

## 2024-02-22 DIAGNOSIS — Z89.422 ACQUIRED ABSENCE OF OTHER LEFT TOE(S): Chronic | ICD-10-CM

## 2024-02-22 DIAGNOSIS — Z95.1 PRESENCE OF AORTOCORONARY BYPASS GRAFT: Chronic | ICD-10-CM

## 2024-02-22 DIAGNOSIS — Z95.810 PRESENCE OF AUTOMATIC (IMPLANTABLE) CARDIAC DEFIBRILLATOR: Chronic | ICD-10-CM

## 2024-02-22 PROCEDURE — 99183 HYPERBARIC OXYGEN THERAPY: CPT

## 2024-02-22 PROCEDURE — 82962 GLUCOSE BLOOD TEST: CPT

## 2024-02-22 PROCEDURE — G0277: CPT

## 2024-02-22 NOTE — PROCEDURE
[Cane] : cane [Outpatient] : Outpatient [THIS CHAMBER HAS BEEN CLEANED / DISINFECTED] : This chamber has been cleaned / disinfected according to local and hospital policy and procedure prior to this treatment. [____] : Post-Dive: Time - [unfilled] [___] : Post-Dive: Value - [unfilled] mg/dL [I have examined and evaluated this patient today, including ears and lungs and have cleared the patient] : I have examined and evaluated this patient today, including ears and lungs and have cleared the patient to continue HBOT as prescribed.  [I have ordered 1 HBOT session at the indicated protocol as seen below] : I have ordered 1 HBOT session at the indicated protocol as seen below [Patient demonstrated and verbalized proper technique for using air break mask] : Patient demonstrated and verbalized proper technique for using air break mask [Patient educated on the risks of SMOKING prior to HBOT with understanding] : Patient educated on the risks of SMOKING prior to HBOT with understanding [Patient educated on the risks of CONSUMING ALCOHOL prior to HBOT with understanding] : Patient educated on the risks of CONSUMING ALCOHOL prior to HBOT with understanding [100% Cotton] : 100% cotton [No hair oils, wigs, hairpieces, pins] : no hair oils, wigs, hairpieces, pins  [Empty all pockets] : empty all pockets [Pre tx medications] : pre tx medications  [No make-up, creams] : no make-up, creams  [Hearing aid removed] : hearing aid removed [No jewelry] : no jewelry  [No matches, cigarettes, lighters] : no matches, cigarettes, lighters  [Dentures removed] : dentures removed [Ground bracelet on pt's wrist] : ground bracelet on pt's wrist  [Contacts removed] : contacts removed  [No reading material] : no reading material  [Remove nail polish] : remove nail polish  [No contraindicated dressings] : no contraindicated dressings [Bra, undergarments removed] : bra, undergarments removed  [Ground Wire - VISUAL Verification - Intact/Free of Obstruction] : Ground Wire - VISUAL Verification - Intact/Free of Obstruction  [Ground Continuity - Verified < 1 ohm w/ Wrist Strap Moises] : Ground Continuity - Verified < 1 ohm w/ Wrist Strap Moises [Diagnosis: ___] : Diagnosis: [unfilled] [Number: ___] : Number: [unfilled] [Clear all fields] : clear all fields [] : No [FreeTextEntry1] : 2.0 jevon [FreeTextEntry5] : 4015 [FreeTextEntry3] : 90 min [FreeTextEntry9] : 7317 [facility] : 0451 [de-identified] : 110 min [de-identified] : 4457

## 2024-02-22 NOTE — ASSESSMENT
[No change from previous assessment] : No change from previous assessment [Patient prepared for dive] : Patient prepared for dive [Patient undergoing HBO treatment for __________] : Patient undergoing HBO treatment for [unfilled] [Patient descended without problem for 9 minutes] : Patient descended without problem for 9 minutes [No dizziness or thirst] :  No dizziness or thirst [Vital signs stable] : Vital signs stable [No ear problems] : No ear problems [Respiratory Rate Stable] : Respiratory Rate Stable [No Chest Pain, shortness of breath] : No Chest Pain, shortness of breath [Tolerating dive well] : Tolerating dive well [Tolerated Ascent well] : Tolerated Ascent well [No chest pain, shortness of breath, or ear pain] :  No chest pain, shortness of breath, or ear pain  [Vital Signs stable] : Vital Signs stable [No] : No [A physician was present throughout the entire HBOT] : A physician was present throughout the entire HBOT [Clinically Stable] : Clinically stable [Continue Treatment Plan] : Continue treatment plan [0] : 0 out of 10

## 2024-02-23 ENCOUNTER — OUTPATIENT (OUTPATIENT)
Dept: OUTPATIENT SERVICES | Facility: HOSPITAL | Age: 54
LOS: 1 days | End: 2024-02-23
Payer: MEDICARE

## 2024-02-23 ENCOUNTER — APPOINTMENT (OUTPATIENT)
Dept: HYPERBARIC MEDICINE | Facility: CLINIC | Age: 54
End: 2024-02-23
Payer: MEDICARE

## 2024-02-23 VITALS
RESPIRATION RATE: 16 BRPM | HEART RATE: 76 BPM | DIASTOLIC BLOOD PRESSURE: 85 MMHG | OXYGEN SATURATION: 100 % | SYSTOLIC BLOOD PRESSURE: 165 MMHG

## 2024-02-23 VITALS
TEMPERATURE: 97.8 F | HEART RATE: 99 BPM | OXYGEN SATURATION: 100 % | DIASTOLIC BLOOD PRESSURE: 55 MMHG | RESPIRATION RATE: 16 BRPM | SYSTOLIC BLOOD PRESSURE: 119 MMHG

## 2024-02-23 DIAGNOSIS — I77.1 STRICTURE OF ARTERY: ICD-10-CM

## 2024-02-23 DIAGNOSIS — M79.606 PAIN IN LEG, UNSPECIFIED: ICD-10-CM

## 2024-02-23 DIAGNOSIS — Z95.810 PRESENCE OF AUTOMATIC (IMPLANTABLE) CARDIAC DEFIBRILLATOR: Chronic | ICD-10-CM

## 2024-02-23 DIAGNOSIS — L97.524 NON-PRESSURE CHRONIC ULCER OF OTHER PART OF LEFT FOOT WITH NECROSIS OF BONE: ICD-10-CM

## 2024-02-23 DIAGNOSIS — E11.9 TYPE 2 DIABETES MELLITUS WITHOUT COMPLICATIONS: ICD-10-CM

## 2024-02-23 PROCEDURE — 82962 GLUCOSE BLOOD TEST: CPT

## 2024-02-23 PROCEDURE — G0277: CPT

## 2024-02-23 PROCEDURE — 99183 HYPERBARIC OXYGEN THERAPY: CPT

## 2024-02-23 NOTE — ASSESSMENT
[No change from previous assessment] : No change from previous assessment [Patient prepared for dive] : Patient prepared for dive [Patient undergoing HBO treatment for __________] : Patient undergoing HBO treatment for [unfilled] [No dizziness or thirst] :  No dizziness or thirst [Patient descended without problem for 9 minutes] : Patient descended without problem for 9 minutes [No ear problems] : No ear problems [Vital signs stable] : Vital signs stable [Tolerating dive well] : Tolerating dive well [No Chest Pain, shortness of breath] : No Chest Pain, shortness of breath [Respiratory Rate Stable] : Respiratory Rate Stable [No chest pain, shortness of breath, or ear pain] :  No chest pain, shortness of breath, or ear pain  [Tolerated Ascent well] : Tolerated Ascent well [Vital Signs stable] : Vital Signs stable [A physician was present throughout the entire HBOT] : A physician was present throughout the entire HBOT [No] : No [Clinically Stable] : Clinically stable [Continue Treatment Plan] : Continue treatment plan [0] : 0 out of 10

## 2024-02-23 NOTE — PROCEDURE
[Outpatient] : Outpatient [Cane] : cane [THIS CHAMBER HAS BEEN CLEANED / DISINFECTED] : This chamber has been cleaned / disinfected according to local and hospital policy and procedure prior to this treatment. [___] : Post-Dive: Value - [unfilled] mg/dL [____] : Post-Dive: Time - [unfilled] [I have examined and evaluated this patient today, including ears and lungs and have cleared the patient] : I have examined and evaluated this patient today, including ears and lungs and have cleared the patient to continue HBOT as prescribed.  [I have ordered 1 HBOT session at the indicated protocol as seen below] : I have ordered 1 HBOT session at the indicated protocol as seen below [Patient demonstrated and verbalized proper technique for using air break mask] : Patient demonstrated and verbalized proper technique for using air break mask [Patient educated on the risks of CONSUMING ALCOHOL prior to HBOT with understanding] : Patient educated on the risks of CONSUMING ALCOHOL prior to HBOT with understanding [Patient educated on the risks of SMOKING prior to HBOT with understanding] : Patient educated on the risks of SMOKING prior to HBOT with understanding [Empty all pockets] : empty all pockets [100% Cotton] : 100% cotton [No hair oils, wigs, hairpieces, pins] : no hair oils, wigs, hairpieces, pins  [No make-up, creams] : no make-up, creams  [Pre tx medications] : pre tx medications  [No jewelry] : no jewelry  [Hearing aid removed] : hearing aid removed [No matches, cigarettes, lighters] : no matches, cigarettes, lighters  [Ground bracelet on pt's wrist] : ground bracelet on pt's wrist  [Dentures removed] : dentures removed [Contacts removed] : contacts removed  [No reading material] : no reading material  [Remove nail polish] : remove nail polish  [Bra, undergarments removed] : bra, undergarments removed  [No contraindicated dressings] : no contraindicated dressings [Ground Wire - VISUAL Verification - Intact/Free of Obstruction] : Ground Wire - VISUAL Verification - Intact/Free of Obstruction  [Ground Continuity - Verified < 1 ohm w/ Wrist Strap Moises] : Ground Continuity - Verified < 1 ohm w/ Wrist Strap Moises [Diagnosis: ___] : Diagnosis: [unfilled] [Number: ___] : Number: [unfilled] [Clear all fields] : clear all fields [] : No [FreeTextEntry1] : 2.0 jevon [FreeTextEntry3] : 90 min [FreeTextEntry5] : 0909 [FreeTextEntry7] : 2346 [FreeTextEntry9] : 6103 [de-identified] : 0949 [de-identified] : 110 min

## 2024-02-26 ENCOUNTER — APPOINTMENT (OUTPATIENT)
Dept: HYPERBARIC MEDICINE | Facility: CLINIC | Age: 54
End: 2024-02-26
Payer: MEDICARE

## 2024-02-26 ENCOUNTER — OUTPATIENT (OUTPATIENT)
Dept: OUTPATIENT SERVICES | Facility: HOSPITAL | Age: 54
LOS: 1 days | End: 2024-02-26
Payer: MEDICARE

## 2024-02-26 VITALS
OXYGEN SATURATION: 100 % | TEMPERATURE: 97.4 F | SYSTOLIC BLOOD PRESSURE: 165 MMHG | HEART RATE: 89 BPM | RESPIRATION RATE: 16 BRPM | DIASTOLIC BLOOD PRESSURE: 94 MMHG

## 2024-02-26 DIAGNOSIS — Z89.422 ACQUIRED ABSENCE OF OTHER LEFT TOE(S): Chronic | ICD-10-CM

## 2024-02-26 DIAGNOSIS — Z95.1 PRESENCE OF AORTOCORONARY BYPASS GRAFT: Chronic | ICD-10-CM

## 2024-02-26 DIAGNOSIS — Z95.810 PRESENCE OF AUTOMATIC (IMPLANTABLE) CARDIAC DEFIBRILLATOR: Chronic | ICD-10-CM

## 2024-02-26 PROCEDURE — G0277: CPT

## 2024-02-26 PROCEDURE — 82962 GLUCOSE BLOOD TEST: CPT

## 2024-02-26 PROCEDURE — 99183 HYPERBARIC OXYGEN THERAPY: CPT

## 2024-02-26 NOTE — PROCEDURE
[Outpatient] : Outpatient [Cane] : cane [THIS CHAMBER HAS BEEN CLEANED / DISINFECTED] : This chamber has been cleaned / disinfected according to local and hospital policy and procedure prior to this treatment. [____] : Recheck: Time - [unfilled] [___] : Recheck: Value - [unfilled] mg/dL [I have examined and evaluated this patient today, including ears and lungs and have cleared the patient] : I have examined and evaluated this patient today, including ears and lungs and have cleared the patient to continue HBOT as prescribed.  [I have ordered 1 HBOT session at the indicated protocol as seen below] : I have ordered 1 HBOT session at the indicated protocol as seen below [Patient demonstrated and verbalized proper technique for using air break mask] : Patient demonstrated and verbalized proper technique for using air break mask [Patient educated on the risks of CONSUMING ALCOHOL prior to HBOT with understanding] : Patient educated on the risks of CONSUMING ALCOHOL prior to HBOT with understanding [Patient educated on the risks of SMOKING prior to HBOT with understanding] : Patient educated on the risks of SMOKING prior to HBOT with understanding [100% Cotton] : 100% cotton [No hair oils, wigs, hairpieces, pins] : no hair oils, wigs, hairpieces, pins  [Empty all pockets] : empty all pockets [Pre tx medications] : pre tx medications  [No make-up, creams] : no make-up, creams  [No jewelry] : no jewelry  [Hearing aid removed] : hearing aid removed [No matches, cigarettes, lighters] : no matches, cigarettes, lighters  [Dentures removed] : dentures removed [Ground bracelet on pt's wrist] : ground bracelet on pt's wrist  [Remove nail polish] : remove nail polish  [Contacts removed] : contacts removed  [Bra, undergarments removed] : bra, undergarments removed  [No reading material] : no reading material  [Ground Wire - VISUAL Verification - Intact/Free of Obstruction] : Ground Wire - VISUAL Verification - Intact/Free of Obstruction  [No contraindicated dressings] : no contraindicated dressings [Ground Continuity - Verified < 1 ohm w/ Wrist Strap Moises] : Ground Continuity - Verified < 1 ohm w/ Wrist Strap Moises [Clear all fields] : clear all fields [Diagnosis: ___] : Diagnosis: [unfilled] [Number: ___] : Number: [unfilled] [] : No [FreeTextEntry1] : 2.0 jevon [FreeTextEntry3] : 90 min [FreeTextEntry5] : 6657 [FreeTextEntry7] : 3006 [FreeTextEntry9] : 1847 [de-identified] : 1000 [de-identified] : 110 min

## 2024-02-26 NOTE — ASSESSMENT
[No change from previous assessment] : No change from previous assessment [Patient prepared for dive] : Patient prepared for dive [Patient descended without problem for 9 minutes] : Patient descended without problem for 9 minutes [Patient undergoing HBO treatment for __________] : Patient undergoing HBO treatment for [unfilled] [No ear problems] : No ear problems [No dizziness or thirst] :  No dizziness or thirst [Vital signs stable] : Vital signs stable [Tolerating dive well] : Tolerating dive well [Respiratory Rate Stable] : Respiratory Rate Stable [No Chest Pain, shortness of breath] : No Chest Pain, shortness of breath [No chest pain, shortness of breath, or ear pain] :  No chest pain, shortness of breath, or ear pain  [Tolerated Ascent well] : Tolerated Ascent well [No] : No [Vital Signs stable] : Vital Signs stable [A physician was present throughout the entire HBOT] : A physician was present throughout the entire HBOT [Clinically Stable] : Clinically stable [Continue Treatment Plan] : Continue treatment plan [0] : 0 out of 10

## 2024-02-27 ENCOUNTER — APPOINTMENT (OUTPATIENT)
Dept: HYPERBARIC MEDICINE | Facility: CLINIC | Age: 54
End: 2024-02-27
Payer: MEDICARE

## 2024-02-27 ENCOUNTER — OUTPATIENT (OUTPATIENT)
Dept: OUTPATIENT SERVICES | Facility: HOSPITAL | Age: 54
LOS: 1 days | End: 2024-02-27
Payer: MEDICARE

## 2024-02-27 VITALS
DIASTOLIC BLOOD PRESSURE: 64 MMHG | SYSTOLIC BLOOD PRESSURE: 146 MMHG | OXYGEN SATURATION: 100 % | HEART RATE: 99 BPM | TEMPERATURE: 97.1 F | RESPIRATION RATE: 16 BRPM

## 2024-02-27 DIAGNOSIS — M79.606 PAIN IN LEG, UNSPECIFIED: ICD-10-CM

## 2024-02-27 DIAGNOSIS — Z95.810 PRESENCE OF AUTOMATIC (IMPLANTABLE) CARDIAC DEFIBRILLATOR: Chronic | ICD-10-CM

## 2024-02-27 DIAGNOSIS — Z95.1 PRESENCE OF AORTOCORONARY BYPASS GRAFT: Chronic | ICD-10-CM

## 2024-02-27 DIAGNOSIS — Z89.422 ACQUIRED ABSENCE OF OTHER LEFT TOE(S): Chronic | ICD-10-CM

## 2024-02-27 PROCEDURE — 99183 HYPERBARIC OXYGEN THERAPY: CPT

## 2024-02-27 PROCEDURE — 82962 GLUCOSE BLOOD TEST: CPT

## 2024-02-27 PROCEDURE — G0277: CPT

## 2024-02-27 NOTE — PROCEDURE
[Outpatient] : Outpatient [Cane] : cane [THIS CHAMBER HAS BEEN CLEANED / DISINFECTED] : This chamber has been cleaned / disinfected according to local and hospital policy and procedure prior to this treatment. [____] : Recheck: Time - [unfilled] [I have examined and evaluated this patient today, including ears and lungs and have cleared the patient] : I have examined and evaluated this patient today, including ears and lungs and have cleared the patient to continue HBOT as prescribed.  [___] : Recheck: Value - [unfilled] mg/dL [I have ordered 1 HBOT session at the indicated protocol as seen below] : I have ordered 1 HBOT session at the indicated protocol as seen below [Patient demonstrated and verbalized proper technique for using air break mask] : Patient demonstrated and verbalized proper technique for using air break mask [Patient educated on the risks of CONSUMING ALCOHOL prior to HBOT with understanding] : Patient educated on the risks of CONSUMING ALCOHOL prior to HBOT with understanding [Patient educated on the risks of SMOKING prior to HBOT with understanding] : Patient educated on the risks of SMOKING prior to HBOT with understanding [100% Cotton] : 100% cotton [Empty all pockets] : empty all pockets [No hair oils, wigs, hairpieces, pins] : no hair oils, wigs, hairpieces, pins  [No make-up, creams] : no make-up, creams  [Pre tx medications] : pre tx medications  [No matches, cigarettes, lighters] : no matches, cigarettes, lighters  [No jewelry] : no jewelry  [Hearing aid removed] : hearing aid removed [Ground bracelet on pt's wrist] : ground bracelet on pt's wrist  [Dentures removed] : dentures removed [Remove nail polish] : remove nail polish  [Contacts removed] : contacts removed  [Bra, undergarments removed] : bra, undergarments removed  [No reading material] : no reading material  [No contraindicated dressings] : no contraindicated dressings [Ground Wire - VISUAL Verification - Intact/Free of Obstruction] : Ground Wire - VISUAL Verification - Intact/Free of Obstruction  [Ground Continuity - Verified < 1 ohm w/ Wrist Strap Moises] : Ground Continuity - Verified < 1 ohm w/ Wrist Strap Moises [Clear all fields] : clear all fields [Diagnosis: ___] : Diagnosis: [unfilled] [Number: ___] : Number: [unfilled] [] : No [FreeTextEntry1] : 2.0 jevon [FreeTextEntry3] : 90 min [FreeTextEntry5] : 4178 [FreeTextEntry7] : 5967 [FreeTextEntry9] : 8229 [de-identified] : 1000 [de-identified] : 110 min

## 2024-02-27 NOTE — ASSESSMENT
[No change from previous assessment] : No change from previous assessment [Patient prepared for dive] : Patient prepared for dive [Patient undergoing HBO treatment for __________] : Patient undergoing HBO treatment for [unfilled] [Patient descended without problem for 9 minutes] : Patient descended without problem for 9 minutes [No dizziness or thirst] :  No dizziness or thirst [No ear problems] : No ear problems [Tolerating dive well] : Tolerating dive well [Vital signs stable] : Vital signs stable [Respiratory Rate Stable] : Respiratory Rate Stable [No Chest Pain, shortness of breath] : No Chest Pain, shortness of breath [Tolerated Ascent well] : Tolerated Ascent well [No chest pain, shortness of breath, or ear pain] :  No chest pain, shortness of breath, or ear pain  [Vital Signs stable] : Vital Signs stable [A physician was present throughout the entire HBOT] : A physician was present throughout the entire HBOT [No] : No [Continue Treatment Plan] : Continue treatment plan [Clinically Stable] : Clinically stable [0] : 0 out of 10

## 2024-02-28 ENCOUNTER — APPOINTMENT (OUTPATIENT)
Dept: HYPERBARIC MEDICINE | Facility: CLINIC | Age: 54
End: 2024-02-28
Payer: MEDICARE

## 2024-02-28 ENCOUNTER — OUTPATIENT (OUTPATIENT)
Dept: OUTPATIENT SERVICES | Facility: HOSPITAL | Age: 54
LOS: 1 days | End: 2024-02-28
Payer: MEDICARE

## 2024-02-28 ENCOUNTER — APPOINTMENT (OUTPATIENT)
Dept: NEPHROLOGY | Facility: CLINIC | Age: 54
End: 2024-02-28

## 2024-02-28 VITALS
TEMPERATURE: 98 F | SYSTOLIC BLOOD PRESSURE: 171 MMHG | DIASTOLIC BLOOD PRESSURE: 86 MMHG | HEART RATE: 76 BPM | OXYGEN SATURATION: 100 % | RESPIRATION RATE: 16 BRPM

## 2024-02-28 DIAGNOSIS — Z95.810 PRESENCE OF AUTOMATIC (IMPLANTABLE) CARDIAC DEFIBRILLATOR: Chronic | ICD-10-CM

## 2024-02-28 DIAGNOSIS — M79.606 PAIN IN LEG, UNSPECIFIED: ICD-10-CM

## 2024-02-28 PROCEDURE — 99183 HYPERBARIC OXYGEN THERAPY: CPT

## 2024-02-28 PROCEDURE — 82962 GLUCOSE BLOOD TEST: CPT

## 2024-02-28 PROCEDURE — G0277: CPT

## 2024-02-28 NOTE — PROCEDURE
[Outpatient] : Outpatient [Cane] : cane [THIS CHAMBER HAS BEEN CLEANED / DISINFECTED] : This chamber has been cleaned / disinfected according to local and hospital policy and procedure prior to this treatment. [____] : Recheck: Time - [unfilled] [___] : Recheck: Value - [unfilled] mg/dL [I have ordered 1 HBOT session at the indicated protocol as seen below] : I have ordered 1 HBOT session at the indicated protocol as seen below [I have examined and evaluated this patient today, including ears and lungs and have cleared the patient] : I have examined and evaluated this patient today, including ears and lungs and have cleared the patient to continue HBOT as prescribed.  [Patient demonstrated and verbalized proper technique for using air break mask] : Patient demonstrated and verbalized proper technique for using air break mask [Patient educated on the risks of CONSUMING ALCOHOL prior to HBOT with understanding] : Patient educated on the risks of CONSUMING ALCOHOL prior to HBOT with understanding [Patient educated on the risks of SMOKING prior to HBOT with understanding] : Patient educated on the risks of SMOKING prior to HBOT with understanding [100% Cotton] : 100% cotton [No hair oils, wigs, hairpieces, pins] : no hair oils, wigs, hairpieces, pins  [Empty all pockets] : empty all pockets [No make-up, creams] : no make-up, creams  [Pre tx medications] : pre tx medications  [No jewelry] : no jewelry  [Hearing aid removed] : hearing aid removed [No matches, cigarettes, lighters] : no matches, cigarettes, lighters  [Dentures removed] : dentures removed [Ground bracelet on pt's wrist] : ground bracelet on pt's wrist  [Contacts removed] : contacts removed  [Remove nail polish] : remove nail polish  [No reading material] : no reading material  [Bra, undergarments removed] : bra, undergarments removed  [No contraindicated dressings] : no contraindicated dressings [Ground Wire - VISUAL Verification - Intact/Free of Obstruction] : Ground Wire - VISUAL Verification - Intact/Free of Obstruction  [Ground Continuity - Verified < 1 ohm w/ Wrist Strap Moises] : Ground Continuity - Verified < 1 ohm w/ Wrist Strap Moises [Clear all fields] : clear all fields [Diagnosis: ___] : Diagnosis: [unfilled] [Number: ___] : Number: [unfilled] [] : No [FreeTextEntry1] : 2.0 jevon [FreeTextEntry3] : 90 min [FreeTextEntry7] : 7266 [FreeTextEntry5] : 7409 [FreeTextEntry9] : 6349 [de-identified] : 1000 [de-identified] : 110 min

## 2024-02-28 NOTE — ASSESSMENT
[No change from previous assessment] : No change from previous assessment [Patient prepared for dive] : Patient prepared for dive [Patient undergoing HBO treatment for __________] : Patient undergoing HBO treatment for [unfilled] [Patient descended without problem for 9 minutes] : Patient descended without problem for 9 minutes [No dizziness or thirst] :  No dizziness or thirst [No ear problems] : No ear problems [Tolerating dive well] : Tolerating dive well [Vital signs stable] : Vital signs stable [No Chest Pain, shortness of breath] : No Chest Pain, shortness of breath [Respiratory Rate Stable] : Respiratory Rate Stable [No chest pain, shortness of breath, or ear pain] :  No chest pain, shortness of breath, or ear pain  [Tolerated Ascent well] : Tolerated Ascent well [Vital Signs stable] : Vital Signs stable [A physician was present throughout the entire HBOT] : A physician was present throughout the entire HBOT [No] : No [Clinically Stable] : Clinically stable [Continue Treatment Plan] : Continue treatment plan [0] : 0 out of 10

## 2024-02-29 ENCOUNTER — OUTPATIENT (OUTPATIENT)
Dept: OUTPATIENT SERVICES | Facility: HOSPITAL | Age: 54
LOS: 1 days | End: 2024-02-29
Payer: MEDICARE

## 2024-02-29 ENCOUNTER — APPOINTMENT (OUTPATIENT)
Dept: HYPERBARIC MEDICINE | Facility: CLINIC | Age: 54
End: 2024-02-29
Payer: MEDICARE

## 2024-02-29 VITALS
SYSTOLIC BLOOD PRESSURE: 146 MMHG | OXYGEN SATURATION: 99 % | TEMPERATURE: 98.3 F | HEART RATE: 95 BPM | RESPIRATION RATE: 16 BRPM | DIASTOLIC BLOOD PRESSURE: 69 MMHG

## 2024-02-29 VITALS
OXYGEN SATURATION: 99 % | DIASTOLIC BLOOD PRESSURE: 80 MMHG | HEART RATE: 75 BPM | SYSTOLIC BLOOD PRESSURE: 157 MMHG | RESPIRATION RATE: 16 BRPM

## 2024-02-29 DIAGNOSIS — Z95.810 PRESENCE OF AUTOMATIC (IMPLANTABLE) CARDIAC DEFIBRILLATOR: Chronic | ICD-10-CM

## 2024-02-29 DIAGNOSIS — E11.621 TYPE 2 DIABETES MELLITUS WITH FOOT ULCER: ICD-10-CM

## 2024-02-29 DIAGNOSIS — M86.331 CHRONIC MULTIFOCAL OSTEOMYELITIS, RIGHT RADIUS AND ULNA: ICD-10-CM

## 2024-02-29 DIAGNOSIS — L97.522 NON-PRESSURE CHRONIC ULCER OF OTHER PART OF LEFT FOOT WITH FAT LAYER EXPOSED: ICD-10-CM

## 2024-02-29 DIAGNOSIS — M79.606 PAIN IN LEG, UNSPECIFIED: ICD-10-CM

## 2024-02-29 PROCEDURE — 99183 HYPERBARIC OXYGEN THERAPY: CPT

## 2024-02-29 PROCEDURE — 82962 GLUCOSE BLOOD TEST: CPT

## 2024-02-29 PROCEDURE — G0277: CPT

## 2024-02-29 NOTE — PROCEDURE
[Outpatient] : Outpatient [Cane] : cane [THIS CHAMBER HAS BEEN CLEANED / DISINFECTED] : This chamber has been cleaned / disinfected according to local and hospital policy and procedure prior to this treatment. [____] : Recheck: Time - [unfilled] [I have examined and evaluated this patient today, including ears and lungs and have cleared the patient] : I have examined and evaluated this patient today, including ears and lungs and have cleared the patient to continue HBOT as prescribed.  [___] : Recheck: Value - [unfilled] mg/dL [I have ordered 1 HBOT session at the indicated protocol as seen below] : I have ordered 1 HBOT session at the indicated protocol as seen below [Patient demonstrated and verbalized proper technique for using air break mask] : Patient demonstrated and verbalized proper technique for using air break mask [Patient educated on the risks of SMOKING prior to HBOT with understanding] : Patient educated on the risks of SMOKING prior to HBOT with understanding [Patient educated on the risks of CONSUMING ALCOHOL prior to HBOT with understanding] : Patient educated on the risks of CONSUMING ALCOHOL prior to HBOT with understanding [No hair oils, wigs, hairpieces, pins] : no hair oils, wigs, hairpieces, pins  [Empty all pockets] : empty all pockets [100% Cotton] : 100% cotton [Pre tx medications] : pre tx medications  [No make-up, creams] : no make-up, creams  [No matches, cigarettes, lighters] : no matches, cigarettes, lighters  [No jewelry] : no jewelry  [Hearing aid removed] : hearing aid removed [Ground bracelet on pt's wrist] : ground bracelet on pt's wrist  [Dentures removed] : dentures removed [Contacts removed] : contacts removed  [Remove nail polish] : remove nail polish  [Bra, undergarments removed] : bra, undergarments removed  [No reading material] : no reading material  [No contraindicated dressings] : no contraindicated dressings [Ground Wire - VISUAL Verification - Intact/Free of Obstruction] : Ground Wire - VISUAL Verification - Intact/Free of Obstruction  [Ground Continuity - Verified < 1 ohm w/ Wrist Strap Moises] : Ground Continuity - Verified < 1 ohm w/ Wrist Strap Moises [Diagnosis: ___] : Diagnosis: [unfilled] [Number: ___] : Number: [unfilled] [Clear all fields] : clear all fields [] : No [FreeTextEntry1] : 2.0 jevon [FreeTextEntry3] : 90 min [FreeTextEntry5] : 3002 [FreeTextEntry7] : 2153 [FreeTextEntry9] : 3872 [de-identified] : 0998 [de-identified] : 110 min

## 2024-02-29 NOTE — ASSESSMENT
[Patient prepared for dive] : Patient prepared for dive [No change from previous assessment] : No change from previous assessment [Patient descended without problem for 9 minutes] : Patient descended without problem for 9 minutes [Patient undergoing HBO treatment for __________] : Patient undergoing HBO treatment for [unfilled] [No ear problems] : No ear problems [No dizziness or thirst] :  No dizziness or thirst [Tolerating dive well] : Tolerating dive well [No Chest Pain, shortness of breath] : No Chest Pain, shortness of breath [Vital signs stable] : Vital signs stable [No chest pain, shortness of breath, or ear pain] :  No chest pain, shortness of breath, or ear pain  [Respiratory Rate Stable] : Respiratory Rate Stable [Tolerated Ascent well] : Tolerated Ascent well [Vital Signs stable] : Vital Signs stable [No] : No [A physician was present throughout the entire HBOT] : A physician was present throughout the entire HBOT [Clinically Stable] : Clinically stable [Continue Treatment Plan] : Continue treatment plan [0] : 0 out of 10

## 2024-03-01 ENCOUNTER — APPOINTMENT (OUTPATIENT)
Dept: HYPERBARIC MEDICINE | Facility: CLINIC | Age: 54
End: 2024-03-01
Payer: MEDICARE

## 2024-03-01 ENCOUNTER — APPOINTMENT (OUTPATIENT)
Dept: WOUND CARE | Facility: HOSPITAL | Age: 54
End: 2024-03-01
Payer: MEDICARE

## 2024-03-01 ENCOUNTER — OUTPATIENT (OUTPATIENT)
Dept: OUTPATIENT SERVICES | Facility: HOSPITAL | Age: 54
LOS: 1 days | End: 2024-03-01
Payer: MEDICARE

## 2024-03-01 VITALS
TEMPERATURE: 97.7 F | SYSTOLIC BLOOD PRESSURE: 145 MMHG | RESPIRATION RATE: 16 BRPM | HEART RATE: 102 BPM | OXYGEN SATURATION: 97 % | DIASTOLIC BLOOD PRESSURE: 74 MMHG

## 2024-03-01 DIAGNOSIS — E11.621 TYPE 2 DIABETES MELLITUS WITH FOOT ULCER: ICD-10-CM

## 2024-03-01 DIAGNOSIS — L97.522 NON-PRESSURE CHRONIC ULCER OF OTHER PART OF LEFT FOOT WITH FAT LAYER EXPOSED: ICD-10-CM

## 2024-03-01 DIAGNOSIS — M86.331 CHRONIC MULTIFOCAL OSTEOMYELITIS, RIGHT RADIUS AND ULNA: ICD-10-CM

## 2024-03-01 DIAGNOSIS — Z89.422 ACQUIRED ABSENCE OF OTHER LEFT TOE(S): Chronic | ICD-10-CM

## 2024-03-01 DIAGNOSIS — M79.606 PAIN IN LEG, UNSPECIFIED: ICD-10-CM

## 2024-03-01 DIAGNOSIS — Z95.810 PRESENCE OF AUTOMATIC (IMPLANTABLE) CARDIAC DEFIBRILLATOR: Chronic | ICD-10-CM

## 2024-03-01 DIAGNOSIS — Z95.1 PRESENCE OF AORTOCORONARY BYPASS GRAFT: Chronic | ICD-10-CM

## 2024-03-01 PROCEDURE — G0277: CPT

## 2024-03-01 PROCEDURE — 15275 SKIN SUB GRAFT FACE/NK/HF/G: CPT

## 2024-03-01 PROCEDURE — 82962 GLUCOSE BLOOD TEST: CPT

## 2024-03-01 PROCEDURE — 99183 HYPERBARIC OXYGEN THERAPY: CPT

## 2024-03-01 NOTE — ASSESSMENT

## 2024-03-01 NOTE — HISTORY OF PRESENT ILLNESS
[FreeTextEntry1] : Patient presents with chief complaint of non-healing ulcer 1 metatarsal head left foot +PVD seeing Dr. Malone Right foot ulcer and surgery is completely healed. Non-healing wound left foot with osteomyelitis +DM with PVD Fibrogranular tissue medial 1 metatarsal head with poor healing potential left foot Right foot is completely healed with no signs of infection or wounds right foot PMH: Kidney disease replacement transplant CAD CABGx3 Exposed bone medial 1 metatarsal left foot Patient put on cipro for chronic OM by Dr. Moore ID No ascending cellulitis decreased edema 1 met left foot no erythema chronic osteomyelitis left foot patient is improving with HBO returns s/p apligraf and presents for another apligraf left foot for today

## 2024-03-01 NOTE — PLAN
[FreeTextEntry1] : full thickness debridement of ulceration elft foot with sterile Curette down to the level of the subcutaneous tissue Patient was using santyl collagenase with gauze and julius daily but no dressing changes for past week after apligraf application Patient doing HBO therapy patient presents for follow up Apligraf  left foot s/p 2 weeks Explained to patient that since he does not have any revasc options according to Dr. 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 Malone regarding PVD continue daily dressing changes and surgical shoe left foot  Fibrotic appearance to wound but decreased size and depth noted but minimal bleeding and exposed bone noted with no signs of cellulitis after full thickness debridement improved appearance to wound with increased granular appearance. Patient told to return 1 week Plan for skin substitute. Apligraf  Type: apligraf left foot  Wound has shown recent improvement through Increased granulation tissue and decrease in wound size  Wound is free from infection, drainage and necrotic tissue  Patient has adequate blood supply as demonstrated by bleeding  Compression type (for venous wounds):  Patient is on a comprehensive diabetic management program:  Offloading measures (for DFU):CROW  Wound has been treated with standards of care for many weeks no signs of cellulitis return 1 week

## 2024-03-01 NOTE — PROCEDURE
[Outpatient] : Outpatient [Cane] : cane [THIS CHAMBER HAS BEEN CLEANED / DISINFECTED] : This chamber has been cleaned / disinfected according to local and hospital policy and procedure prior to this treatment. [____] : Post-Dive: Time - [unfilled] [___] : Post-Dive: Value - [unfilled] mg/dL [I have ordered 1 HBOT session at the indicated protocol as seen below] : I have ordered 1 HBOT session at the indicated protocol as seen below [I have examined and evaluated this patient today, including ears and lungs and have cleared the patient] : I have examined and evaluated this patient today, including ears and lungs and have cleared the patient to continue HBOT as prescribed.  [Patient demonstrated and verbalized proper technique for using air break mask] : Patient demonstrated and verbalized proper technique for using air break mask [Patient educated on the risks of SMOKING prior to HBOT with understanding] : Patient educated on the risks of SMOKING prior to HBOT with understanding [Patient educated on the risks of CONSUMING ALCOHOL prior to HBOT with understanding] : Patient educated on the risks of CONSUMING ALCOHOL prior to HBOT with understanding [100% Cotton] : 100% cotton [Empty all pockets] : empty all pockets [No hair oils, wigs, hairpieces, pins] : no hair oils, wigs, hairpieces, pins  [Pre tx medications] : pre tx medications  [No make-up, creams] : no make-up, creams  [No jewelry] : no jewelry  [No matches, cigarettes, lighters] : no matches, cigarettes, lighters  [Hearing aid removed] : hearing aid removed [Dentures removed] : dentures removed [Ground bracelet on pt's wrist] : ground bracelet on pt's wrist  [Contacts removed] : contacts removed  [Remove nail polish] : remove nail polish  [No reading material] : no reading material  [Bra, undergarments removed] : bra, undergarments removed  [No contraindicated dressings] : no contraindicated dressings [Ground Wire - VISUAL Verification - Intact/Free of Obstruction] : Ground Wire - VISUAL Verification - Intact/Free of Obstruction  [Ground Continuity - Verified < 1 ohm w/ Wrist Strap Moises] : Ground Continuity - Verified < 1 ohm w/ Wrist Strap Moises [Clear all fields] : clear all fields [Diagnosis: ___] : Diagnosis: [unfilled] [Number: ___] : Number: [unfilled] [] : No [FreeTextEntry1] : 2.0 jevon [FreeTextEntry3] : 90 min [FreeTextEntry5] : 2604 [FreeTextEntry7] : 5189 [FreeTextEntry9] : 1937 [de-identified] : 110 min [de-identified] : 0985

## 2024-03-04 ENCOUNTER — OUTPATIENT (OUTPATIENT)
Dept: OUTPATIENT SERVICES | Facility: HOSPITAL | Age: 54
LOS: 1 days | End: 2024-03-04
Payer: MEDICARE

## 2024-03-04 ENCOUNTER — APPOINTMENT (OUTPATIENT)
Dept: HYPERBARIC MEDICINE | Facility: CLINIC | Age: 54
End: 2024-03-04
Payer: MEDICARE

## 2024-03-04 VITALS
OXYGEN SATURATION: 97 % | DIASTOLIC BLOOD PRESSURE: 74 MMHG | HEART RATE: 95 BPM | SYSTOLIC BLOOD PRESSURE: 150 MMHG | TEMPERATURE: 97.4 F | RESPIRATION RATE: 16 BRPM

## 2024-03-04 DIAGNOSIS — Z95.810 PRESENCE OF AUTOMATIC (IMPLANTABLE) CARDIAC DEFIBRILLATOR: Chronic | ICD-10-CM

## 2024-03-04 DIAGNOSIS — Z95.1 PRESENCE OF AORTOCORONARY BYPASS GRAFT: Chronic | ICD-10-CM

## 2024-03-04 DIAGNOSIS — Z89.422 ACQUIRED ABSENCE OF OTHER LEFT TOE(S): Chronic | ICD-10-CM

## 2024-03-04 PROCEDURE — 99183 HYPERBARIC OXYGEN THERAPY: CPT

## 2024-03-04 PROCEDURE — 82962 GLUCOSE BLOOD TEST: CPT

## 2024-03-04 PROCEDURE — G0277: CPT

## 2024-03-04 NOTE — ASSESSMENT
[No change from previous assessment] : No change from previous assessment [Patient prepared for dive] : Patient prepared for dive [Patient undergoing HBO treatment for __________] : Patient undergoing HBO treatment for [unfilled] [Patient descended without problem for 9 minutes] : Patient descended without problem for 9 minutes [No dizziness or thirst] :  No dizziness or thirst [No ear problems] : No ear problems [Vital signs stable] : Vital signs stable [Tolerating dive well] : Tolerating dive well [No Chest Pain, shortness of breath] : No Chest Pain, shortness of breath [Respiratory Rate Stable] : Respiratory Rate Stable [No chest pain, shortness of breath, or ear pain] :  No chest pain, shortness of breath, or ear pain  [Vital Signs stable] : Vital Signs stable [Tolerated Ascent well] : Tolerated Ascent well [A physician was present throughout the entire HBOT] : A physician was present throughout the entire HBOT [No] : No [Clinically Stable] : Clinically stable [Continue Treatment Plan] : Continue treatment plan [0] : 0 out of 10

## 2024-03-04 NOTE — PROCEDURE
[Outpatient] : Outpatient [Cane] : cane [THIS CHAMBER HAS BEEN CLEANED / DISINFECTED] : This chamber has been cleaned / disinfected according to local and hospital policy and procedure prior to this treatment. [____] : Post-Dive: Time - [unfilled] [___] : Post-Dive: Value - [unfilled] mg/dL [I have examined and evaluated this patient today, including ears and lungs and have cleared the patient] : I have examined and evaluated this patient today, including ears and lungs and have cleared the patient to continue HBOT as prescribed.  [I have ordered 1 HBOT session at the indicated protocol as seen below] : I have ordered 1 HBOT session at the indicated protocol as seen below [Patient educated on the risks of SMOKING prior to HBOT with understanding] : Patient educated on the risks of SMOKING prior to HBOT with understanding [Patient demonstrated and verbalized proper technique for using air break mask] : Patient demonstrated and verbalized proper technique for using air break mask [Patient educated on the risks of CONSUMING ALCOHOL prior to HBOT with understanding] : Patient educated on the risks of CONSUMING ALCOHOL prior to HBOT with understanding [100% Cotton] : 100% cotton [Empty all pockets] : empty all pockets [No hair oils, wigs, hairpieces, pins] : no hair oils, wigs, hairpieces, pins  [No make-up, creams] : no make-up, creams  [Pre tx medications] : pre tx medications  [No jewelry] : no jewelry  [No matches, cigarettes, lighters] : no matches, cigarettes, lighters  [Hearing aid removed] : hearing aid removed [Dentures removed] : dentures removed [Ground bracelet on pt's wrist] : ground bracelet on pt's wrist  [Contacts removed] : contacts removed  [Remove nail polish] : remove nail polish  [No reading material] : no reading material  [Bra, undergarments removed] : bra, undergarments removed  [Ground Wire - VISUAL Verification - Intact/Free of Obstruction] : Ground Wire - VISUAL Verification - Intact/Free of Obstruction  [No contraindicated dressings] : no contraindicated dressings [Ground Continuity - Verified < 1 ohm w/ Wrist Strap Moises] : Ground Continuity - Verified < 1 ohm w/ Wrist Strap Moises [Diagnosis: ___] : Diagnosis: [unfilled] [Clear all fields] : clear all fields [Number: ___] : Number: [unfilled] [] : No [FreeTextEntry1] : 2.0 jevon [FreeTextEntry5] : 6963 [FreeTextEntry3] : 90 min [FreeTextEntry7] : 1263 [FreeTextEntry9] : 2266 [de-identified] : 0944 [de-identified] : 110 min

## 2024-03-05 ENCOUNTER — APPOINTMENT (OUTPATIENT)
Dept: HYPERBARIC MEDICINE | Facility: CLINIC | Age: 54
End: 2024-03-05
Payer: MEDICARE

## 2024-03-05 ENCOUNTER — OUTPATIENT (OUTPATIENT)
Dept: OUTPATIENT SERVICES | Facility: HOSPITAL | Age: 54
LOS: 1 days | End: 2024-03-05
Payer: MEDICARE

## 2024-03-05 VITALS
OXYGEN SATURATION: 97 % | HEART RATE: 83 BPM | DIASTOLIC BLOOD PRESSURE: 79 MMHG | SYSTOLIC BLOOD PRESSURE: 151 MMHG | TEMPERATURE: 97.6 F | RESPIRATION RATE: 16 BRPM

## 2024-03-05 DIAGNOSIS — M86.331 CHRONIC MULTIFOCAL OSTEOMYELITIS, RIGHT RADIUS AND ULNA: ICD-10-CM

## 2024-03-05 DIAGNOSIS — E11.621 TYPE 2 DIABETES MELLITUS WITH FOOT ULCER: ICD-10-CM

## 2024-03-05 DIAGNOSIS — L97.522 NON-PRESSURE CHRONIC ULCER OF OTHER PART OF LEFT FOOT WITH FAT LAYER EXPOSED: ICD-10-CM

## 2024-03-05 DIAGNOSIS — M79.606 PAIN IN LEG, UNSPECIFIED: ICD-10-CM

## 2024-03-05 DIAGNOSIS — Z95.810 PRESENCE OF AUTOMATIC (IMPLANTABLE) CARDIAC DEFIBRILLATOR: Chronic | ICD-10-CM

## 2024-03-05 PROCEDURE — 82962 GLUCOSE BLOOD TEST: CPT

## 2024-03-05 PROCEDURE — 99183 HYPERBARIC OXYGEN THERAPY: CPT

## 2024-03-05 PROCEDURE — G0277: CPT

## 2024-03-05 NOTE — PROCEDURE
[Outpatient] : Outpatient [Cane] : cane [THIS CHAMBER HAS BEEN CLEANED / DISINFECTED] : This chamber has been cleaned / disinfected according to local and hospital policy and procedure prior to this treatment. [____] : Post-Dive: Time - [unfilled] [___] : Post-Dive: Value - [unfilled] mg/dL [I have examined and evaluated this patient today, including ears and lungs and have cleared the patient] : I have examined and evaluated this patient today, including ears and lungs and have cleared the patient to continue HBOT as prescribed.  [I have ordered 1 HBOT session at the indicated protocol as seen below] : I have ordered 1 HBOT session at the indicated protocol as seen below [Patient demonstrated and verbalized proper technique for using air break mask] : Patient demonstrated and verbalized proper technique for using air break mask [Patient educated on the risks of SMOKING prior to HBOT with understanding] : Patient educated on the risks of SMOKING prior to HBOT with understanding [Patient educated on the risks of CONSUMING ALCOHOL prior to HBOT with understanding] : Patient educated on the risks of CONSUMING ALCOHOL prior to HBOT with understanding [Empty all pockets] : empty all pockets [100% Cotton] : 100% cotton [No hair oils, wigs, hairpieces, pins] : no hair oils, wigs, hairpieces, pins  [Pre tx medications] : pre tx medications  [No make-up, creams] : no make-up, creams  [No matches, cigarettes, lighters] : no matches, cigarettes, lighters  [No jewelry] : no jewelry  [Dentures removed] : dentures removed [Hearing aid removed] : hearing aid removed [Ground bracelet on pt's wrist] : ground bracelet on pt's wrist  [Contacts removed] : contacts removed  [Remove nail polish] : remove nail polish  [No reading material] : no reading material  [Bra, undergarments removed] : bra, undergarments removed  [No contraindicated dressings] : no contraindicated dressings [Ground Wire - VISUAL Verification - Intact/Free of Obstruction] : Ground Wire - VISUAL Verification - Intact/Free of Obstruction  [Ground Continuity - Verified < 1 ohm w/ Wrist Strap Moises] : Ground Continuity - Verified < 1 ohm w/ Wrist Strap Moises [Clear all fields] : clear all fields [Diagnosis: ___] : Diagnosis: [unfilled] [Number: ___] : Number: [unfilled] [] : No [FreeTextEntry1] : 2.0 jevon [FreeTextEntry3] : 90 min [FreeTextEntry5] : 5697 [FreeTextEntry7] : 6863 [FreeTextEntry9] : 2242 [de-identified] : 0944 [de-identified] : 110 min

## 2024-03-05 NOTE — ASSESSMENT
[No change from previous assessment] : No change from previous assessment [Patient undergoing HBO treatment for __________] : Patient undergoing HBO treatment for [unfilled] [Patient prepared for dive] : Patient prepared for dive [No dizziness or thirst] :  No dizziness or thirst [Patient descended without problem for 9 minutes] : Patient descended without problem for 9 minutes [No ear problems] : No ear problems [Vital signs stable] : Vital signs stable [Tolerating dive well] : Tolerating dive well [No Chest Pain, shortness of breath] : No Chest Pain, shortness of breath [Respiratory Rate Stable] : Respiratory Rate Stable [Tolerated Ascent well] : Tolerated Ascent well [No chest pain, shortness of breath, or ear pain] :  No chest pain, shortness of breath, or ear pain  [Vital Signs stable] : Vital Signs stable [No] : No [A physician was present throughout the entire HBOT] : A physician was present throughout the entire HBOT [Clinically Stable] : Clinically stable [Continue Treatment Plan] : Continue treatment plan [0] : 0 out of 10

## 2024-03-06 ENCOUNTER — OUTPATIENT (OUTPATIENT)
Dept: OUTPATIENT SERVICES | Facility: HOSPITAL | Age: 54
LOS: 1 days | End: 2024-03-06
Payer: MEDICARE

## 2024-03-06 ENCOUNTER — APPOINTMENT (OUTPATIENT)
Dept: HYPERBARIC MEDICINE | Facility: CLINIC | Age: 54
End: 2024-03-06
Payer: MEDICARE

## 2024-03-06 ENCOUNTER — APPOINTMENT (OUTPATIENT)
Dept: OTOLARYNGOLOGY | Facility: CLINIC | Age: 54
End: 2024-03-06

## 2024-03-06 VITALS
HEART RATE: 78 BPM | OXYGEN SATURATION: 100 % | TEMPERATURE: 97.8 F | RESPIRATION RATE: 16 BRPM | SYSTOLIC BLOOD PRESSURE: 169 MMHG | DIASTOLIC BLOOD PRESSURE: 95 MMHG

## 2024-03-06 VITALS
DIASTOLIC BLOOD PRESSURE: 75 MMHG | OXYGEN SATURATION: 99 % | HEART RATE: 87 BPM | RESPIRATION RATE: 16 BRPM | SYSTOLIC BLOOD PRESSURE: 148 MMHG | TEMPERATURE: 97.8 F

## 2024-03-06 DIAGNOSIS — Z95.810 PRESENCE OF AUTOMATIC (IMPLANTABLE) CARDIAC DEFIBRILLATOR: Chronic | ICD-10-CM

## 2024-03-06 DIAGNOSIS — Z95.1 PRESENCE OF AORTOCORONARY BYPASS GRAFT: Chronic | ICD-10-CM

## 2024-03-06 DIAGNOSIS — Z89.422 ACQUIRED ABSENCE OF OTHER LEFT TOE(S): Chronic | ICD-10-CM

## 2024-03-06 DIAGNOSIS — M79.606 PAIN IN LEG, UNSPECIFIED: ICD-10-CM

## 2024-03-06 PROCEDURE — 99183 HYPERBARIC OXYGEN THERAPY: CPT

## 2024-03-06 PROCEDURE — 82962 GLUCOSE BLOOD TEST: CPT

## 2024-03-06 PROCEDURE — G0277: CPT

## 2024-03-06 NOTE — ASSESSMENT
[Patient prepared for dive] : Patient prepared for dive [No change from previous assessment] : No change from previous assessment [Patient undergoing HBO treatment for __________] : Patient undergoing HBO treatment for [unfilled] [Patient descended without problem for 9 minutes] : Patient descended without problem for 9 minutes [No dizziness or thirst] :  No dizziness or thirst [No ear problems] : No ear problems [Tolerating dive well] : Tolerating dive well [Vital signs stable] : Vital signs stable [No Chest Pain, shortness of breath] : No Chest Pain, shortness of breath [Respiratory Rate Stable] : Respiratory Rate Stable [No chest pain, shortness of breath, or ear pain] :  No chest pain, shortness of breath, or ear pain  [Tolerated Ascent well] : Tolerated Ascent well [Vital Signs stable] : Vital Signs stable [A physician was present throughout the entire HBOT] : A physician was present throughout the entire HBOT [No] : No [Clinically Stable] : Clinically stable [Continue Treatment Plan] : Continue treatment plan [0] : 0 out of 10

## 2024-03-06 NOTE — PROCEDURE
[Outpatient] : Outpatient [Cane] : cane [THIS CHAMBER HAS BEEN CLEANED / DISINFECTED] : This chamber has been cleaned / disinfected according to local and hospital policy and procedure prior to this treatment. [____] : Post-Dive: Time - [unfilled] [___] : Post-Dive: Value - [unfilled] mg/dL [I have examined and evaluated this patient today, including ears and lungs and have cleared the patient] : I have examined and evaluated this patient today, including ears and lungs and have cleared the patient to continue HBOT as prescribed.  [I have ordered 1 HBOT session at the indicated protocol as seen below] : I have ordered 1 HBOT session at the indicated protocol as seen below [Patient demonstrated and verbalized proper technique for using air break mask] : Patient demonstrated and verbalized proper technique for using air break mask [Patient educated on the risks of SMOKING prior to HBOT with understanding] : Patient educated on the risks of SMOKING prior to HBOT with understanding [Patient educated on the risks of CONSUMING ALCOHOL prior to HBOT with understanding] : Patient educated on the risks of CONSUMING ALCOHOL prior to HBOT with understanding [Empty all pockets] : empty all pockets [100% Cotton] : 100% cotton [No hair oils, wigs, hairpieces, pins] : no hair oils, wigs, hairpieces, pins  [Pre tx medications] : pre tx medications  [No make-up, creams] : no make-up, creams  [No jewelry] : no jewelry  [No matches, cigarettes, lighters] : no matches, cigarettes, lighters  [Hearing aid removed] : hearing aid removed [Ground bracelet on pt's wrist] : ground bracelet on pt's wrist  [Dentures removed] : dentures removed [Remove nail polish] : remove nail polish  [Contacts removed] : contacts removed  [No reading material] : no reading material  [Bra, undergarments removed] : bra, undergarments removed  [No contraindicated dressings] : no contraindicated dressings [Ground Wire - VISUAL Verification - Intact/Free of Obstruction] : Ground Wire - VISUAL Verification - Intact/Free of Obstruction  [Ground Continuity - Verified < 1 ohm w/ Wrist Strap Moises] : Ground Continuity - Verified < 1 ohm w/ Wrist Strap Moises [Clear all fields] : clear all fields [Diagnosis: ___] : Diagnosis: [unfilled] [Number: ___] : Number: [unfilled] [] : No [FreeTextEntry1] : 2.0 jevon [FreeTextEntry5] : 2828 [FreeTextEntry3] : 90 min [FreeTextEntry7] : 2929 [FreeTextEntry9] : 9830 [de-identified] : 0942 [de-identified] : 110 min

## 2024-03-07 ENCOUNTER — APPOINTMENT (OUTPATIENT)
Dept: HYPERBARIC MEDICINE | Facility: CLINIC | Age: 54
End: 2024-03-07
Payer: MEDICARE

## 2024-03-07 ENCOUNTER — OUTPATIENT (OUTPATIENT)
Dept: OUTPATIENT SERVICES | Facility: HOSPITAL | Age: 54
LOS: 1 days | End: 2024-03-07
Payer: MEDICARE

## 2024-03-07 VITALS
OXYGEN SATURATION: 99 % | RESPIRATION RATE: 16 BRPM | TEMPERATURE: 98 F | DIASTOLIC BLOOD PRESSURE: 70 MMHG | SYSTOLIC BLOOD PRESSURE: 137 MMHG | HEART RATE: 92 BPM

## 2024-03-07 DIAGNOSIS — Z95.1 PRESENCE OF AORTOCORONARY BYPASS GRAFT: Chronic | ICD-10-CM

## 2024-03-07 DIAGNOSIS — Z89.422 ACQUIRED ABSENCE OF OTHER LEFT TOE(S): Chronic | ICD-10-CM

## 2024-03-07 DIAGNOSIS — Z95.810 PRESENCE OF AUTOMATIC (IMPLANTABLE) CARDIAC DEFIBRILLATOR: Chronic | ICD-10-CM

## 2024-03-07 DIAGNOSIS — M79.606 PAIN IN LEG, UNSPECIFIED: ICD-10-CM

## 2024-03-07 PROCEDURE — G0277: CPT

## 2024-03-07 PROCEDURE — 82962 GLUCOSE BLOOD TEST: CPT

## 2024-03-07 PROCEDURE — 99183 HYPERBARIC OXYGEN THERAPY: CPT

## 2024-03-07 NOTE — PROCEDURE
[Outpatient] : Outpatient [Cane] : cane [THIS CHAMBER HAS BEEN CLEANED / DISINFECTED] : This chamber has been cleaned / disinfected according to local and hospital policy and procedure prior to this treatment. [____] : Post-Dive: Time - [unfilled] [___] : Post-Dive: Value - [unfilled] mg/dL [I have examined and evaluated this patient today, including ears and lungs and have cleared the patient] : I have examined and evaluated this patient today, including ears and lungs and have cleared the patient to continue HBOT as prescribed.  [I have ordered 1 HBOT session at the indicated protocol as seen below] : I have ordered 1 HBOT session at the indicated protocol as seen below [Patient demonstrated and verbalized proper technique for using air break mask] : Patient demonstrated and verbalized proper technique for using air break mask [Patient educated on the risks of SMOKING prior to HBOT with understanding] : Patient educated on the risks of SMOKING prior to HBOT with understanding [Patient educated on the risks of CONSUMING ALCOHOL prior to HBOT with understanding] : Patient educated on the risks of CONSUMING ALCOHOL prior to HBOT with understanding [100% Cotton] : 100% cotton [Empty all pockets] : empty all pockets [No hair oils, wigs, hairpieces, pins] : no hair oils, wigs, hairpieces, pins  [Pre tx medications] : pre tx medications  [No make-up, creams] : no make-up, creams  [No jewelry] : no jewelry  [No matches, cigarettes, lighters] : no matches, cigarettes, lighters  [Hearing aid removed] : hearing aid removed [Dentures removed] : dentures removed [Ground bracelet on pt's wrist] : ground bracelet on pt's wrist  [Remove nail polish] : remove nail polish  [Contacts removed] : contacts removed  [No reading material] : no reading material  [Bra, undergarments removed] : bra, undergarments removed  [No contraindicated dressings] : no contraindicated dressings [Ground Wire - VISUAL Verification - Intact/Free of Obstruction] : Ground Wire - VISUAL Verification - Intact/Free of Obstruction  [Ground Continuity - Verified < 1 ohm w/ Wrist Strap Moises] : Ground Continuity - Verified < 1 ohm w/ Wrist Strap Moises [Diagnosis: ___] : Diagnosis: [unfilled] [Number: ___] : Number: [unfilled] [] : No [Clear all fields] : clear all fields [FreeTextEntry1] : 2.0 jevon [FreeTextEntry3] : 90 min [FreeTextEntry5] : 6541 [FreeTextEntry9] : 6019 [FreeTextEntry7] : 6877 [de-identified] : 1007 [de-identified] : 110 min

## 2024-03-08 ENCOUNTER — APPOINTMENT (OUTPATIENT)
Dept: HYPERBARIC MEDICINE | Facility: CLINIC | Age: 54
End: 2024-03-08
Payer: MEDICARE

## 2024-03-08 ENCOUNTER — OUTPATIENT (OUTPATIENT)
Dept: OUTPATIENT SERVICES | Facility: HOSPITAL | Age: 54
LOS: 1 days | End: 2024-03-08

## 2024-03-08 ENCOUNTER — APPOINTMENT (OUTPATIENT)
Dept: WOUND CARE | Facility: HOSPITAL | Age: 54
End: 2024-03-08
Payer: MEDICARE

## 2024-03-08 ENCOUNTER — OUTPATIENT (OUTPATIENT)
Dept: OUTPATIENT SERVICES | Facility: HOSPITAL | Age: 54
LOS: 1 days | End: 2024-03-08
Payer: MEDICARE

## 2024-03-08 VITALS
SYSTOLIC BLOOD PRESSURE: 139 MMHG | DIASTOLIC BLOOD PRESSURE: 55 MMHG | HEART RATE: 86 BPM | RESPIRATION RATE: 16 BRPM | OXYGEN SATURATION: 100 % | TEMPERATURE: 97.8 F

## 2024-03-08 VITALS
DIASTOLIC BLOOD PRESSURE: 98 MMHG | SYSTOLIC BLOOD PRESSURE: 153 MMHG | HEART RATE: 80 BPM | OXYGEN SATURATION: 100 % | RESPIRATION RATE: 16 BRPM

## 2024-03-08 DIAGNOSIS — M86.331 CHRONIC MULTIFOCAL OSTEOMYELITIS, RIGHT RADIUS AND ULNA: ICD-10-CM

## 2024-03-08 DIAGNOSIS — L97.522 NON-PRESSURE CHRONIC ULCER OF OTHER PART OF LEFT FOOT WITH FAT LAYER EXPOSED: ICD-10-CM

## 2024-03-08 DIAGNOSIS — Z95.810 PRESENCE OF AUTOMATIC (IMPLANTABLE) CARDIAC DEFIBRILLATOR: Chronic | ICD-10-CM

## 2024-03-08 DIAGNOSIS — Z89.422 ACQUIRED ABSENCE OF OTHER LEFT TOE(S): Chronic | ICD-10-CM

## 2024-03-08 DIAGNOSIS — Z95.1 PRESENCE OF AORTOCORONARY BYPASS GRAFT: Chronic | ICD-10-CM

## 2024-03-08 DIAGNOSIS — E11.621 TYPE 2 DIABETES MELLITUS WITH FOOT ULCER: ICD-10-CM

## 2024-03-08 PROCEDURE — 99183 HYPERBARIC OXYGEN THERAPY: CPT

## 2024-03-08 PROCEDURE — 15275 SKIN SUB GRAFT FACE/NK/HF/G: CPT

## 2024-03-08 PROCEDURE — 82962 GLUCOSE BLOOD TEST: CPT

## 2024-03-08 PROCEDURE — G0277: CPT

## 2024-03-08 NOTE — ASSESSMENT
[No change from previous assessment] : No change from previous assessment [Patient prepared for dive] : Patient prepared for dive [Patient undergoing HBO treatment for __________] : Patient undergoing HBO treatment for [unfilled] [Patient descended without problem for 9 minutes] : Patient descended without problem for 9 minutes [No dizziness or thirst] :  No dizziness or thirst [No ear problems] : No ear problems [Vital signs stable] : Vital signs stable [Tolerating dive well] : Tolerating dive well [No Chest Pain, shortness of breath] : No Chest Pain, shortness of breath [Respiratory Rate Stable] : Respiratory Rate Stable [No chest pain, shortness of breath, or ear pain] :  No chest pain, shortness of breath, or ear pain  [Tolerated Ascent well] : Tolerated Ascent well [Vital Signs stable] : Vital Signs stable [No] : No [A physician was present throughout the entire HBOT] : A physician was present throughout the entire HBOT [Continue Treatment Plan] : Continue treatment plan [Clinically Stable] : Clinically stable [0] : 0 out of 10

## 2024-03-08 NOTE — PROCEDURE
[Outpatient] : Outpatient [Cane] : cane [THIS CHAMBER HAS BEEN CLEANED / DISINFECTED] : This chamber has been cleaned / disinfected according to local and hospital policy and procedure prior to this treatment. [____] : Post-Dive: Time - [unfilled] [___] : Post-Dive: Value - [unfilled] mg/dL [I have examined and evaluated this patient today, including ears and lungs and have cleared the patient] : I have examined and evaluated this patient today, including ears and lungs and have cleared the patient to continue HBOT as prescribed.  [I have ordered 1 HBOT session at the indicated protocol as seen below] : I have ordered 1 HBOT session at the indicated protocol as seen below [Patient demonstrated and verbalized proper technique for using air break mask] : Patient demonstrated and verbalized proper technique for using air break mask [Patient educated on the risks of SMOKING prior to HBOT with understanding] : Patient educated on the risks of SMOKING prior to HBOT with understanding [Patient educated on the risks of CONSUMING ALCOHOL prior to HBOT with understanding] : Patient educated on the risks of CONSUMING ALCOHOL prior to HBOT with understanding [100% Cotton] : 100% cotton [No hair oils, wigs, hairpieces, pins] : no hair oils, wigs, hairpieces, pins  [Empty all pockets] : empty all pockets [Pre tx medications] : pre tx medications  [No make-up, creams] : no make-up, creams  [No matches, cigarettes, lighters] : no matches, cigarettes, lighters  [No jewelry] : no jewelry  [Dentures removed] : dentures removed [Hearing aid removed] : hearing aid removed [Ground bracelet on pt's wrist] : ground bracelet on pt's wrist  [Contacts removed] : contacts removed  [Remove nail polish] : remove nail polish  [No reading material] : no reading material  [Bra, undergarments removed] : bra, undergarments removed  [No contraindicated dressings] : no contraindicated dressings [Ground Continuity - Verified < 1 ohm w/ Wrist Strap Moises] : Ground Continuity - Verified < 1 ohm w/ Wrist Strap Moises [Ground Wire - VISUAL Verification - Intact/Free of Obstruction] : Ground Wire - VISUAL Verification - Intact/Free of Obstruction  [Diagnosis: ___] : Diagnosis: [unfilled] [Number: ___] : Number: [unfilled] [Clear all fields] : clear all fields [] : No [FreeTextEntry1] : 2.0 jevon [FreeTextEntry3] : 90 min [FreeTextEntry5] : 4613 [FreeTextEntry7] : 7732 [FreeTextEntry9] : 6776 [de-identified] : 0686 [de-identified] : 110 min

## 2024-03-08 NOTE — PLAN
[FreeTextEntry1] : full thickness debridement of ulceration elft foot with sterile Curette down to the level of the subcutaneous tissue Patient was using santyl collagenase with gauze and julius daily but no dressing changes for past week after apligraf application Patient doing HBO therapy patient presents for follow up Apligraf  left foot s/p 1 week Explained to patient that since he does not have any revasc options according to Dr. 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 Malone regarding PVD continue daily dressing changes and surgical shoe left foot  Fibrotic appearance to wound but decreased size and depth noted but minimal bleeding and exposed bone noted with no signs of cellulitis after full thickness debridement improved appearance to wound with increased granular appearance. Patient told to return 1 week Plan for skin substitute. Apligraf  Type: apligraf left foot  Wound has shown recent improvement through Increased granulation tissue and decrease in wound size  Wound is free from infection, drainage and necrotic tissue  Patient has adequate blood supply as demonstrated by bleeding  Compression type (for venous wounds):  Patient is on a comprehensive diabetic management program:  Offloading measures (for DFU):CROW  Wound has been treated with standards of care for many weeks no signs of cellulitis return 1 week

## 2024-03-11 ENCOUNTER — APPOINTMENT (OUTPATIENT)
Dept: HYPERBARIC MEDICINE | Facility: CLINIC | Age: 54
End: 2024-03-11
Payer: MEDICARE

## 2024-03-11 ENCOUNTER — OUTPATIENT (OUTPATIENT)
Dept: OUTPATIENT SERVICES | Facility: HOSPITAL | Age: 54
LOS: 1 days | End: 2024-03-11
Payer: MEDICARE

## 2024-03-11 VITALS
SYSTOLIC BLOOD PRESSURE: 146 MMHG | RESPIRATION RATE: 16 BRPM | TEMPERATURE: 97.2 F | HEART RATE: 82 BPM | DIASTOLIC BLOOD PRESSURE: 77 MMHG | OXYGEN SATURATION: 100 %

## 2024-03-11 DIAGNOSIS — Z95.1 PRESENCE OF AORTOCORONARY BYPASS GRAFT: Chronic | ICD-10-CM

## 2024-03-11 PROCEDURE — 82962 GLUCOSE BLOOD TEST: CPT

## 2024-03-11 PROCEDURE — 99183 HYPERBARIC OXYGEN THERAPY: CPT

## 2024-03-11 PROCEDURE — G0277: CPT

## 2024-03-11 NOTE — PROCEDURE
[Outpatient] : Outpatient [Cane] : cane [THIS CHAMBER HAS BEEN CLEANED / DISINFECTED] : This chamber has been cleaned / disinfected according to local and hospital policy and procedure prior to this treatment. [____] : Post-Dive: Time - [unfilled] [I have examined and evaluated this patient today, including ears and lungs and have cleared the patient] : I have examined and evaluated this patient today, including ears and lungs and have cleared the patient to continue HBOT as prescribed.  [Patient demonstrated and verbalized proper technique for using air break mask] : Patient demonstrated and verbalized proper technique for using air break mask [I have ordered 1 HBOT session at the indicated protocol as seen below] : I have ordered 1 HBOT session at the indicated protocol as seen below [Patient educated on the risks of SMOKING prior to HBOT with understanding] : Patient educated on the risks of SMOKING prior to HBOT with understanding [Patient educated on the risks of CONSUMING ALCOHOL prior to HBOT with understanding] : Patient educated on the risks of CONSUMING ALCOHOL prior to HBOT with understanding [100% Cotton] : 100% cotton [Empty all pockets] : empty all pockets [No hair oils, wigs, hairpieces, pins] : no hair oils, wigs, hairpieces, pins  [No make-up, creams] : no make-up, creams  [Pre tx medications] : pre tx medications  [No jewelry] : no jewelry  [No matches, cigarettes, lighters] : no matches, cigarettes, lighters  [Hearing aid removed] : hearing aid removed [Dentures removed] : dentures removed [Contacts removed] : contacts removed  [Ground bracelet on pt's wrist] : ground bracelet on pt's wrist  [Remove nail polish] : remove nail polish  [No reading material] : no reading material  [Bra, undergarments removed] : bra, undergarments removed  [No contraindicated dressings] : no contraindicated dressings [Ground Continuity - Verified < 1 ohm w/ Wrist Strap Moises] : Ground Continuity - Verified < 1 ohm w/ Wrist Strap Moises [Ground Wire - VISUAL Verification - Intact/Free of Obstruction] : Ground Wire - VISUAL Verification - Intact/Free of Obstruction  [Clear all fields] : clear all fields [Number: ___] : Number: [unfilled] [Diagnosis: ___] : Diagnosis: [unfilled] [___] : Post-Dive: Value - [unfilled] mg/dL [] : No [FreeTextEntry1] : 2.0 jevon [FreeTextEntry7] : 3241 [FreeTextEntry5] : 5069 [FreeTextEntry3] : 90 min [de-identified] : 3355 [FreeTextEntry9] : 7126 [de-identified] : 110 min

## 2024-03-11 NOTE — ASSESSMENT
[No change from previous assessment] : No change from previous assessment [Patient prepared for dive] : Patient prepared for dive [Patient undergoing HBO treatment for __________] : Patient undergoing HBO treatment for [unfilled] [Patient descended without problem for 9 minutes] : Patient descended without problem for 9 minutes [No dizziness or thirst] :  No dizziness or thirst [No ear problems] : No ear problems [Tolerating dive well] : Tolerating dive well [Vital signs stable] : Vital signs stable [No Chest Pain, shortness of breath] : No Chest Pain, shortness of breath [Respiratory Rate Stable] : Respiratory Rate Stable [No chest pain, shortness of breath, or ear pain] :  No chest pain, shortness of breath, or ear pain  [Tolerated Ascent well] : Tolerated Ascent well [A physician was present throughout the entire HBOT] : A physician was present throughout the entire HBOT [Vital Signs stable] : Vital Signs stable [No] : No [Clinically Stable] : Clinically stable [Continue Treatment Plan] : Continue treatment plan [0] : 0 out of 10

## 2024-03-12 ENCOUNTER — APPOINTMENT (OUTPATIENT)
Dept: NEPHROLOGY | Facility: CLINIC | Age: 54
End: 2024-03-12
Payer: MEDICARE

## 2024-03-12 ENCOUNTER — APPOINTMENT (OUTPATIENT)
Dept: HYPERBARIC MEDICINE | Facility: CLINIC | Age: 54
End: 2024-03-12

## 2024-03-12 ENCOUNTER — OUTPATIENT (OUTPATIENT)
Dept: OUTPATIENT SERVICES | Facility: HOSPITAL | Age: 54
LOS: 1 days | End: 2024-03-12
Payer: MEDICARE

## 2024-03-12 VITALS
TEMPERATURE: 97.9 F | RESPIRATION RATE: 16 BRPM | SYSTOLIC BLOOD PRESSURE: 139 MMHG | OXYGEN SATURATION: 100 % | DIASTOLIC BLOOD PRESSURE: 70 MMHG | HEART RATE: 76 BPM

## 2024-03-12 VITALS
HEIGHT: 69 IN | SYSTOLIC BLOOD PRESSURE: 125 MMHG | TEMPERATURE: 98 F | BODY MASS INDEX: 28.14 KG/M2 | OXYGEN SATURATION: 98 % | WEIGHT: 190 LBS | HEART RATE: 91 BPM | DIASTOLIC BLOOD PRESSURE: 47 MMHG

## 2024-03-12 DIAGNOSIS — Z89.422 ACQUIRED ABSENCE OF OTHER LEFT TOE(S): Chronic | ICD-10-CM

## 2024-03-12 DIAGNOSIS — M86.331 CHRONIC MULTIFOCAL OSTEOMYELITIS, RIGHT RADIUS AND ULNA: ICD-10-CM

## 2024-03-12 DIAGNOSIS — E11.621 TYPE 2 DIABETES MELLITUS WITH FOOT ULCER: ICD-10-CM

## 2024-03-12 DIAGNOSIS — M79.606 PAIN IN LEG, UNSPECIFIED: ICD-10-CM

## 2024-03-12 DIAGNOSIS — Z95.810 PRESENCE OF AUTOMATIC (IMPLANTABLE) CARDIAC DEFIBRILLATOR: Chronic | ICD-10-CM

## 2024-03-12 DIAGNOSIS — L97.522 NON-PRESSURE CHRONIC ULCER OF OTHER PART OF LEFT FOOT WITH FAT LAYER EXPOSED: ICD-10-CM

## 2024-03-12 DIAGNOSIS — I25.10 ATHEROSCLEROTIC HEART DISEASE OF NATIVE CORONARY ARTERY W/OUT ANGINA PECTORIS: ICD-10-CM

## 2024-03-12 PROCEDURE — 99183 HYPERBARIC OXYGEN THERAPY: CPT

## 2024-03-12 PROCEDURE — 99214 OFFICE O/P EST MOD 30 MIN: CPT

## 2024-03-12 PROCEDURE — G0277: CPT

## 2024-03-12 PROCEDURE — 82962 GLUCOSE BLOOD TEST: CPT

## 2024-03-12 NOTE — PHYSICAL EXAM
[General Appearance - In No Acute Distress] : in no acute distress [General Appearance - Alert] : alert [Outer Ear] : the ears and nose were normal in appearance [Sclera] : the sclera and conjunctiva were normal [Jugular Venous Distention Increased] : there was no jugular-venous distention [Heart Sounds Pericardial Friction Rub] : no pericardial rub [Heart Sounds Gallop] : no gallops [Cervical Lymph Nodes Enlarged Posterior Bilaterally] : posterior cervical [Involuntary Movements] : no involuntary movements were seen [Cervical Lymph Nodes Enlarged Anterior Bilaterally] : anterior cervical [] : no rash [FreeTextEntry1] : Left foot lesion under dressing [Oriented To Time, Place, And Person] : oriented to person, place, and time [Impaired Insight] : insight and judgment were intact

## 2024-03-12 NOTE — HISTORY OF PRESENT ILLNESS
[FreeTextEntry1] : He received DDRT 7/16/23, Thymo induction. DGF, Recovered. 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 His ureter stent was removed on 9/5/23. Labs done today.   He has left foot dorsum of big toe wound, on Hyperbaric chamber treatment for over 4 weeks. Has Podiatry follow up. He had podiatry visit with Dr. Myers. He had left UE AVF closed  on 9/8 at [facility]. He follows with Dr. Jose Braga for heart disease care. He has AICD.  Home glucose controlled.     No SOB. Home blood pressure is controlled.   No fever. No hematuria   Medications	 aspirin 81 mg oral delayed release tablet: 1 tab(s) orally once a day atorvastatin 40 mg oral tablet: 1 tab(s) orally once a day (at bedtime) carvedilol 3.125 mg oral tablet: 1 tab(s) orally every 12 hours HumaLOG KwikPen 100 units/mL injectable solution: 8-10 unit(s) injectable 3 times a day (before meals) insulin glargine 100 units/mL subcutaneous solution: 21 unit(s) subcutaneous once a day (at bedtime) mycophenolate mofetil   500 milligram(s) orally 2 times a day Pepcid 20 mg oral tablet: 1 tab(s) orally once a day predniSONE   1 tab(s) orally once a day-  senna leaf extract oral tablet: 2 tab(s) orally once a day (at bedtime) sulfamethoxazole-trimethoprim 400 mg-80 mg oral tablet: 1 tab(s) orally once a day- will restart, wbc count has recovered Envarsus: 2 mg once a day        Medication list reviewed with patient. He reports he is off Eliquis, has been on f/u with Dr. Braga, cardiologist.  He is on a new antihypertensive in addition, unsure name. Started by Dr. Braga

## 2024-03-12 NOTE — ASSESSMENT
[No change from previous assessment] : No change from previous assessment [Patient prepared for dive] : Patient prepared for dive [Patient descended without problem for 9 minutes] : Patient descended without problem for 9 minutes [Patient undergoing HBO treatment for __________] : Patient undergoing HBO treatment for [unfilled] [No dizziness or thirst] :  No dizziness or thirst [Tolerating dive well] : Tolerating dive well [No ear problems] : No ear problems [Vital signs stable] : Vital signs stable [No Chest Pain, shortness of breath] : No Chest Pain, shortness of breath [Respiratory Rate Stable] : Respiratory Rate Stable [No chest pain, shortness of breath, or ear pain] :  No chest pain, shortness of breath, or ear pain  [Vital Signs stable] : Vital Signs stable [Tolerated Ascent well] : Tolerated Ascent well [A physician was present throughout the entire HBOT] : A physician was present throughout the entire HBOT [No] : No [Clinically Stable] : Clinically stable [Continue Treatment Plan] : Continue treatment plan [0] : 0 out of 10

## 2024-03-12 NOTE — PROCEDURE
[Outpatient] : Outpatient [Cane] : cane [THIS CHAMBER HAS BEEN CLEANED / DISINFECTED] : This chamber has been cleaned / disinfected according to local and hospital policy and procedure prior to this treatment. [____] : Post-Dive: Time - [unfilled] [___] : Post-Dive: Value - [unfilled] mg/dL [I have examined and evaluated this patient today, including ears and lungs and have cleared the patient] : I have examined and evaluated this patient today, including ears and lungs and have cleared the patient to continue HBOT as prescribed.  [I have ordered 1 HBOT session at the indicated protocol as seen below] : I have ordered 1 HBOT session at the indicated protocol as seen below [Patient demonstrated and verbalized proper technique for using air break mask] : Patient demonstrated and verbalized proper technique for using air break mask [Patient educated on the risks of SMOKING prior to HBOT with understanding] : Patient educated on the risks of SMOKING prior to HBOT with understanding [Patient educated on the risks of CONSUMING ALCOHOL prior to HBOT with understanding] : Patient educated on the risks of CONSUMING ALCOHOL prior to HBOT with understanding [100% Cotton] : 100% cotton [Empty all pockets] : empty all pockets [No hair oils, wigs, hairpieces, pins] : no hair oils, wigs, hairpieces, pins  [Pre tx medications] : pre tx medications  [No make-up, creams] : no make-up, creams  [No jewelry] : no jewelry  [No matches, cigarettes, lighters] : no matches, cigarettes, lighters  [Hearing aid removed] : hearing aid removed [Dentures removed] : dentures removed [Ground bracelet on pt's wrist] : ground bracelet on pt's wrist  [Contacts removed] : contacts removed  [Remove nail polish] : remove nail polish  [No reading material] : no reading material  [Bra, undergarments removed] : bra, undergarments removed  [No contraindicated dressings] : no contraindicated dressings [Ground Wire - VISUAL Verification - Intact/Free of Obstruction] : Ground Wire - VISUAL Verification - Intact/Free of Obstruction  [Ground Continuity - Verified < 1 ohm w/ Wrist Strap Moises] : Ground Continuity - Verified < 1 ohm w/ Wrist Strap Moises [Clear all fields] : clear all fields [Diagnosis: ___] : Diagnosis: [unfilled] [Number: ___] : Number: [unfilled] [] : No [FreeTextEntry1] : 2.0 jevon [FreeTextEntry3] : 90 min [FreeTextEntry5] : 7786 [FreeTextEntry7] : 7341 [FreeTextEntry9] : 4780 [de-identified] : 2185 [de-identified] : 110 min

## 2024-03-12 NOTE — ASSESSMENT
[FreeTextEntry1] : Renal Transplant recipient: Noted allograft function, creatinine at discharge, martin creatinine. Reviewed for urinary symptoms/fever/chills/pain/new symptoms. Tolerating medications. Lab data from last visit reviewed including allograft function, urinalysis, any viremia and trough level of medication as well as any imaging reports. Immunosuppression: reviewed; Envarsus 2, cellcept 500 mg/dose pred  Noted induction regimen, maintenance regimen and reviewed target trough level. Foot ulcer: Left foot under dressing on podiatry f/u, on hyperbaric treatment DM: Current regimen reviewed. Optimal target glucose levels reviewed for fasting and post prandial measurements. Will continue to monitor and adjust treatment; reviewed lifestyle modifications for glycemia control. Foot lesion: Advised to continue Podiatry follow up. Hyperlipidemia: Reviewed medication regimen/lifestyle modification for maintaining target lipid levels. Cardiovascular risk reduction, primary/secondary prevention measures were discussed as appropriate.   Prophylaxis: Reviewed antimicrobial and GI prophylaxis as well as precautions to prevent infections. Advised to bring flow sheets and medication list at every visit. Discussed ophthalmology and dermatology checks at least yearly and vaccinations. Flu vaccine yearly and Pneumonia vaccine every 5 years. Had flu vaccine. Copy of office visit and lab reports are being sent to primary physician and referring nephrologist.

## 2024-03-13 ENCOUNTER — APPOINTMENT (OUTPATIENT)
Dept: HYPERBARIC MEDICINE | Facility: CLINIC | Age: 54
End: 2024-03-13
Payer: MEDICARE

## 2024-03-13 ENCOUNTER — OUTPATIENT (OUTPATIENT)
Dept: OUTPATIENT SERVICES | Facility: HOSPITAL | Age: 54
LOS: 1 days | End: 2024-03-13
Payer: MEDICARE

## 2024-03-13 ENCOUNTER — OUTPATIENT (OUTPATIENT)
Dept: OUTPATIENT SERVICES | Facility: HOSPITAL | Age: 54
LOS: 1 days | End: 2024-03-13

## 2024-03-13 VITALS
HEART RATE: 76 BPM | RESPIRATION RATE: 16 BRPM | DIASTOLIC BLOOD PRESSURE: 85 MMHG | TEMPERATURE: 97.4 F | SYSTOLIC BLOOD PRESSURE: 175 MMHG | OXYGEN SATURATION: 98 %

## 2024-03-13 DIAGNOSIS — L97.509 TYPE 2 DIABETES MELLITUS WITH FOOT ULCER: ICD-10-CM

## 2024-03-13 DIAGNOSIS — L97.522 NON-PRESSURE CHRONIC ULCER OF OTHER PART OF LEFT FOOT WITH FAT LAYER EXPOSED: ICD-10-CM

## 2024-03-13 DIAGNOSIS — E11.621 TYPE 2 DIABETES MELLITUS WITH FOOT ULCER: ICD-10-CM

## 2024-03-13 DIAGNOSIS — Z95.1 PRESENCE OF AORTOCORONARY BYPASS GRAFT: Chronic | ICD-10-CM

## 2024-03-13 DIAGNOSIS — M86.331 CHRONIC MULTIFOCAL OSTEOMYELITIS, RIGHT RADIUS AND ULNA: ICD-10-CM

## 2024-03-13 DIAGNOSIS — M79.606 PAIN IN LEG, UNSPECIFIED: ICD-10-CM

## 2024-03-13 DIAGNOSIS — Z89.422 ACQUIRED ABSENCE OF OTHER LEFT TOE(S): Chronic | ICD-10-CM

## 2024-03-13 DIAGNOSIS — Z95.810 PRESENCE OF AUTOMATIC (IMPLANTABLE) CARDIAC DEFIBRILLATOR: Chronic | ICD-10-CM

## 2024-03-13 LAB
ALBUMIN SERPL ELPH-MCNC: 4.1 G/DL
ALP BLD-CCNC: 81 U/L
ALT SERPL-CCNC: 16 U/L
ANION GAP SERPL CALC-SCNC: 9 MMOL/L
AST SERPL-CCNC: 13 U/L
BILIRUB SERPL-MCNC: 0.4 MG/DL
BUN SERPL-MCNC: 22 MG/DL
CALCIUM SERPL-MCNC: 10.7 MG/DL
CHLORIDE SERPL-SCNC: 107 MMOL/L
CO2 SERPL-SCNC: 22 MMOL/L
CREAT SERPL-MCNC: 0.93 MG/DL
EGFR: 99 ML/MIN/1.73M2
GLUCOSE SERPL-MCNC: 190 MG/DL
HCT VFR BLD CALC: 33.7 %
HGB BLD-MCNC: 10.3 G/DL
LDH SERPL-CCNC: 161 U/L
MAGNESIUM SERPL-MCNC: 1.9 MG/DL
MCHC RBC-ENTMCNC: 28.5 PG
MCHC RBC-ENTMCNC: 30.6 GM/DL
MCV RBC AUTO: 93.1 FL
PHOSPHATE SERPL-MCNC: 2.2 MG/DL
PLATELET # BLD AUTO: 143 K/UL
POTASSIUM SERPL-SCNC: 4.7 MMOL/L
PROT SERPL-MCNC: 6 G/DL
RBC # BLD: 3.62 M/UL
RBC # FLD: 14.8 %
SODIUM SERPL-SCNC: 138 MMOL/L
TACROLIMUS SERPL-MCNC: 10.1 NG/ML
URATE SERPL-MCNC: 5.3 MG/DL
WBC # FLD AUTO: 3.29 K/UL

## 2024-03-13 PROCEDURE — G0277: CPT

## 2024-03-13 PROCEDURE — 82962 GLUCOSE BLOOD TEST: CPT

## 2024-03-13 PROCEDURE — 99183 HYPERBARIC OXYGEN THERAPY: CPT

## 2024-03-13 NOTE — ASSESSMENT

## 2024-03-13 NOTE — ED ADULT NURSE NOTE - TEMPLATE LIST FOR HEAD TO TOE ASSESSMENT
This report was requested by: Amy Eason | Reference #: [id number]    Practitioner Count: 1  Pharmacy Count: 1  Current Opioid Prescriptions: 0  Current Benzodiazepine Prescriptions: 0  Current Stimulant Prescriptions: 0      Patient Demographic Information (PDI)       PDI	First Name	Last Name	Birth Date	Gender	Street Address	[facility] Code  SLY uPlliam	1935	Female	[address]	[facility]	[id number]    Prescription Information      PDI Filter:    PDI	Current Rx	Drug Type	Rx Written	Rx Dispensed	Drug	Quantity	Days Supply  A	N	O	02/06/2024	02/09/2024	acetaminophen-cod #3 tablet	90	30  Prescriber Name Christopher Zhang MD  Prescriber CLIFFORD # [id number]  Payment Method Medicare  Dispenser [id number]  A	N	O	10/31/2023	11/02/2023	acetaminophen-cod #3 tablet	90	30  Prescriber Name Christohper Zhang MD  Prescriber CLIFFORD # [id number]  Payment Method Medicare  Dispenser [id number]  A	N	O	08/08/2023	08/14/2023	acetaminophen-cod #3 tablet	90	30  Prescriber Name Christopher Zhang MD  Prescriber CLIFFORD # [id number]  Payment Method Medicare  Dispenser [id number]  A	N	O	05/02/2023	05/04/2023	acetaminophen-cod #3 tablet	90	30  Prescriber Name Christopher Zhang MD  Prescriber CLIFFORD # [id number]  Payment Method Medicare  Dispenser [facility] Cardiac

## 2024-03-14 ENCOUNTER — APPOINTMENT (OUTPATIENT)
Dept: HYPERBARIC MEDICINE | Facility: CLINIC | Age: 54
End: 2024-03-14
Payer: MEDICARE

## 2024-03-14 ENCOUNTER — OUTPATIENT (OUTPATIENT)
Dept: OUTPATIENT SERVICES | Facility: HOSPITAL | Age: 54
LOS: 1 days | End: 2024-03-14
Payer: MEDICARE

## 2024-03-14 VITALS
SYSTOLIC BLOOD PRESSURE: 141 MMHG | TEMPERATURE: 98.2 F | DIASTOLIC BLOOD PRESSURE: 75 MMHG | RESPIRATION RATE: 16 BRPM | OXYGEN SATURATION: 100 % | HEART RATE: 73 BPM

## 2024-03-14 VITALS
DIASTOLIC BLOOD PRESSURE: 83 MMHG | SYSTOLIC BLOOD PRESSURE: 168 MMHG | OXYGEN SATURATION: 100 % | RESPIRATION RATE: 16 BRPM | HEART RATE: 76 BPM

## 2024-03-14 DIAGNOSIS — Z95.1 PRESENCE OF AORTOCORONARY BYPASS GRAFT: Chronic | ICD-10-CM

## 2024-03-14 DIAGNOSIS — Z89.422 ACQUIRED ABSENCE OF OTHER LEFT TOE(S): Chronic | ICD-10-CM

## 2024-03-14 DIAGNOSIS — M86.331 CHRONIC MULTIFOCAL OSTEOMYELITIS, RIGHT RADIUS AND ULNA: ICD-10-CM

## 2024-03-14 DIAGNOSIS — L97.522 NON-PRESSURE CHRONIC ULCER OF OTHER PART OF LEFT FOOT WITH FAT LAYER EXPOSED: ICD-10-CM

## 2024-03-14 DIAGNOSIS — M79.606 PAIN IN LEG, UNSPECIFIED: ICD-10-CM

## 2024-03-14 DIAGNOSIS — E11.621 TYPE 2 DIABETES MELLITUS WITH FOOT ULCER: ICD-10-CM

## 2024-03-14 DIAGNOSIS — Z95.810 PRESENCE OF AUTOMATIC (IMPLANTABLE) CARDIAC DEFIBRILLATOR: Chronic | ICD-10-CM

## 2024-03-14 PROCEDURE — G0277: CPT

## 2024-03-14 PROCEDURE — 99183 HYPERBARIC OXYGEN THERAPY: CPT

## 2024-03-14 PROCEDURE — 82962 GLUCOSE BLOOD TEST: CPT

## 2024-03-14 NOTE — ASSESSMENT
[No change from previous assessment] : No change from previous assessment [Patient undergoing HBO treatment for __________] : Patient undergoing HBO treatment for [unfilled] [Patient prepared for dive] : Patient prepared for dive [Patient descended without problem for 9 minutes] : Patient descended without problem for 9 minutes [No ear problems] : No ear problems [No dizziness or thirst] :  No dizziness or thirst [Tolerating dive well] : Tolerating dive well [Vital signs stable] : Vital signs stable [No Chest Pain, shortness of breath] : No Chest Pain, shortness of breath [Respiratory Rate Stable] : Respiratory Rate Stable [Tolerated Ascent well] : Tolerated Ascent well [No chest pain, shortness of breath, or ear pain] :  No chest pain, shortness of breath, or ear pain  [A physician was present throughout the entire HBOT] : A physician was present throughout the entire HBOT [Vital Signs stable] : Vital Signs stable [Clinically Stable] : Clinically stable [No] : No [0] : 0 out of 10 [Continue Treatment Plan] : Continue treatment plan

## 2024-03-14 NOTE — PROCEDURE
[Outpatient] : Outpatient [Cane] : cane [THIS CHAMBER HAS BEEN CLEANED / DISINFECTED] : This chamber has been cleaned / disinfected according to local and hospital policy and procedure prior to this treatment. [____] : Post-Dive: Time - [unfilled] [___] : Post-Dive: Value - [unfilled] mg/dL [I have examined and evaluated this patient today, including ears and lungs and have cleared the patient] : I have examined and evaluated this patient today, including ears and lungs and have cleared the patient to continue HBOT as prescribed.  [I have ordered 1 HBOT session at the indicated protocol as seen below] : I have ordered 1 HBOT session at the indicated protocol as seen below [Patient demonstrated and verbalized proper technique for using air break mask] : Patient demonstrated and verbalized proper technique for using air break mask [Patient educated on the risks of SMOKING prior to HBOT with understanding] : Patient educated on the risks of SMOKING prior to HBOT with understanding [Patient educated on the risks of CONSUMING ALCOHOL prior to HBOT with understanding] : Patient educated on the risks of CONSUMING ALCOHOL prior to HBOT with understanding [100% Cotton] : 100% cotton [No hair oils, wigs, hairpieces, pins] : no hair oils, wigs, hairpieces, pins  [Empty all pockets] : empty all pockets [Pre tx medications] : pre tx medications  [No jewelry] : no jewelry  [No make-up, creams] : no make-up, creams  [No matches, cigarettes, lighters] : no matches, cigarettes, lighters  [Hearing aid removed] : hearing aid removed [Ground bracelet on pt's wrist] : ground bracelet on pt's wrist  [Dentures removed] : dentures removed [Contacts removed] : contacts removed  [Remove nail polish] : remove nail polish  [Bra, undergarments removed] : bra, undergarments removed  [No reading material] : no reading material  [No contraindicated dressings] : no contraindicated dressings [Ground Wire - VISUAL Verification - Intact/Free of Obstruction] : Ground Wire - VISUAL Verification - Intact/Free of Obstruction  [Ground Continuity - Verified < 1 ohm w/ Wrist Strap Moises] : Ground Continuity - Verified < 1 ohm w/ Wrist Strap Moises [Diagnosis: ___] : Diagnosis: [unfilled] [Number: ___] : Number: [unfilled] [Clear all fields] : clear all fields [] : No [FreeTextEntry1] : 2.0 jevon [FreeTextEntry3] : 90 min [FreeTextEntry5] : 9245 [FreeTextEntry7] : 2472 [FreeTextEntry9] : 2805 [de-identified] : 0944 [de-identified] : 110 min

## 2024-03-15 ENCOUNTER — APPOINTMENT (OUTPATIENT)
Dept: HYPERBARIC MEDICINE | Facility: CLINIC | Age: 54
End: 2024-03-15
Payer: MEDICARE

## 2024-03-15 ENCOUNTER — OUTPATIENT (OUTPATIENT)
Dept: OUTPATIENT SERVICES | Facility: HOSPITAL | Age: 54
LOS: 1 days | End: 2024-03-15
Payer: MEDICARE

## 2024-03-15 ENCOUNTER — APPOINTMENT (OUTPATIENT)
Dept: WOUND CARE | Facility: HOSPITAL | Age: 54
End: 2024-03-15
Payer: MEDICARE

## 2024-03-15 VITALS
SYSTOLIC BLOOD PRESSURE: 161 MMHG | TEMPERATURE: 98 F | OXYGEN SATURATION: 100 % | HEART RATE: 76 BPM | RESPIRATION RATE: 16 BRPM | DIASTOLIC BLOOD PRESSURE: 84 MMHG

## 2024-03-15 DIAGNOSIS — Z89.422 ACQUIRED ABSENCE OF OTHER LEFT TOE(S): Chronic | ICD-10-CM

## 2024-03-15 DIAGNOSIS — M79.606 PAIN IN LEG, UNSPECIFIED: ICD-10-CM

## 2024-03-15 DIAGNOSIS — Z95.1 PRESENCE OF AORTOCORONARY BYPASS GRAFT: Chronic | ICD-10-CM

## 2024-03-15 DIAGNOSIS — Z95.810 PRESENCE OF AUTOMATIC (IMPLANTABLE) CARDIAC DEFIBRILLATOR: Chronic | ICD-10-CM

## 2024-03-15 DIAGNOSIS — E11.621 TYPE 2 DIABETES MELLITUS WITH FOOT ULCER: ICD-10-CM

## 2024-03-15 DIAGNOSIS — M86.331 CHRONIC MULTIFOCAL OSTEOMYELITIS, RIGHT RADIUS AND ULNA: ICD-10-CM

## 2024-03-15 DIAGNOSIS — L97.522 NON-PRESSURE CHRONIC ULCER OF OTHER PART OF LEFT FOOT WITH FAT LAYER EXPOSED: ICD-10-CM

## 2024-03-15 LAB — BKV DNA SPEC QL NAA+PROBE: NOT DETECTED IU/ML

## 2024-03-15 PROCEDURE — 99183 HYPERBARIC OXYGEN THERAPY: CPT

## 2024-03-15 PROCEDURE — G0277: CPT

## 2024-03-15 PROCEDURE — 15275 SKIN SUB GRAFT FACE/NK/HF/G: CPT

## 2024-03-15 PROCEDURE — 82962 GLUCOSE BLOOD TEST: CPT

## 2024-03-15 NOTE — PROCEDURE
No [Outpatient] : Outpatient [Cane] : cane [THIS CHAMBER HAS BEEN CLEANED / DISINFECTED] : This chamber has been cleaned / disinfected according to local and hospital policy and procedure prior to this treatment. [____] : Post-Dive: Time - [unfilled] [___] : Post-Dive: Value - [unfilled] mg/dL [I have examined and evaluated this patient today, including ears and lungs and have cleared the patient] : I have examined and evaluated this patient today, including ears and lungs and have cleared the patient to continue HBOT as prescribed.  [I have ordered 1 HBOT session at the indicated protocol as seen below] : I have ordered 1 HBOT session at the indicated protocol as seen below [Patient demonstrated and verbalized proper technique for using air break mask] : Patient demonstrated and verbalized proper technique for using air break mask [Patient educated on the risks of SMOKING prior to HBOT with understanding] : Patient educated on the risks of SMOKING prior to HBOT with understanding [Patient educated on the risks of CONSUMING ALCOHOL prior to HBOT with understanding] : Patient educated on the risks of CONSUMING ALCOHOL prior to HBOT with understanding [100% Cotton] : 100% cotton [Empty all pockets] : empty all pockets [No hair oils, wigs, hairpieces, pins] : no hair oils, wigs, hairpieces, pins  [Pre tx medications] : pre tx medications  [No make-up, creams] : no make-up, creams  [No jewelry] : no jewelry  [No matches, cigarettes, lighters] : no matches, cigarettes, lighters  [Dentures removed] : dentures removed [Hearing aid removed] : hearing aid removed [Ground bracelet on pt's wrist] : ground bracelet on pt's wrist  [Contacts removed] : contacts removed  [No reading material] : no reading material  [Remove nail polish] : remove nail polish  [Bra, undergarments removed] : bra, undergarments removed  [No contraindicated dressings] : no contraindicated dressings [Ground Wire - VISUAL Verification - Intact/Free of Obstruction] : Ground Wire - VISUAL Verification - Intact/Free of Obstruction  [Ground Continuity - Verified < 1 ohm w/ Wrist Strap Moises] : Ground Continuity - Verified < 1 ohm w/ Wrist Strap Moises [Diagnosis: ___] : Diagnosis: [unfilled] [Number: ___] : Number: [unfilled] [Clear all fields] : clear all fields [] : No [FreeTextEntry1] : 2.0 jevon [FreeTextEntry3] : 90 min [FreeTextEntry7] : 7648 [FreeTextEntry5] : 0104 [de-identified] : 1392 [FreeTextEntry9] : 9626 [de-identified] : 110 min

## 2024-03-15 NOTE — ASSESSMENT
[No change from previous assessment] : No change from previous assessment [Patient prepared for dive] : Patient prepared for dive [Patient undergoing HBO treatment for __________] : Patient undergoing HBO treatment for [unfilled] [Patient descended without problem for 9 minutes] : Patient descended without problem for 9 minutes [No dizziness or thirst] :  No dizziness or thirst [No ear problems] : No ear problems [Vital signs stable] : Vital signs stable [Tolerating dive well] : Tolerating dive well [No Chest Pain, shortness of breath] : No Chest Pain, shortness of breath [Respiratory Rate Stable] : Respiratory Rate Stable [No chest pain, shortness of breath, or ear pain] :  No chest pain, shortness of breath, or ear pain  [Vital Signs stable] : Vital Signs stable [Tolerated Ascent well] : Tolerated Ascent well [A physician was present throughout the entire HBOT] : A physician was present throughout the entire HBOT [No] : No [Continue Treatment Plan] : Continue treatment plan [Clinically Stable] : Clinically stable [0] : 0 out of 10

## 2024-03-15 NOTE — PLAN
[FreeTextEntry1] : full thickness debridement of ulceration elft foot with sterile Curette down to the level of the subcutaneous tissue Patient was using santyl collagenase with gauze and julius daily but no dressing changes for past week after apligraf application Patient doing HBO therapy patient presents for follow up Apligraf  left foot s/p 1 week Explained to patient that since he does not have any revasc options according to Dr. 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 Malone regarding PVD continue daily dressing changes and surgical shoe left foot  Fibrotic appearance to wound but decreased size and depth noted but minimal bleeding and exposed bone noted with no signs of cellulitis after full thickness debridement improved appearance to wound with increased granular appearance. Patient told to return 1 week Plan for skin substitute. Apligraf  Type: apligraf left foot #2 44 cm used  Wound has shown recent improvement through Increased granulation tissue and decrease in wound size  Wound is free from infection, drainage and necrotic tissue  Patient has adequate blood supply as demonstrated by bleeding  Compression type (for venous wounds):  Patient is on a comprehensive diabetic management program:  Offloading measures (for DFU):CROW  Wound has been treated with standards of care for many weeks no signs of cellulitis return 1 week

## 2024-03-18 ENCOUNTER — APPOINTMENT (OUTPATIENT)
Dept: HYPERBARIC MEDICINE | Facility: CLINIC | Age: 54
End: 2024-03-18
Payer: MEDICARE

## 2024-03-18 ENCOUNTER — OUTPATIENT (OUTPATIENT)
Dept: OUTPATIENT SERVICES | Facility: HOSPITAL | Age: 54
LOS: 1 days | End: 2024-03-18
Payer: MEDICARE

## 2024-03-18 ENCOUNTER — APPOINTMENT (OUTPATIENT)
Dept: HYPERBARIC MEDICINE | Facility: CLINIC | Age: 54
End: 2024-03-18

## 2024-03-18 VITALS
RESPIRATION RATE: 16 BRPM | TEMPERATURE: 97.4 F | DIASTOLIC BLOOD PRESSURE: 85 MMHG | SYSTOLIC BLOOD PRESSURE: 156 MMHG | HEART RATE: 86 BPM

## 2024-03-18 DIAGNOSIS — Z95.810 PRESENCE OF AUTOMATIC (IMPLANTABLE) CARDIAC DEFIBRILLATOR: Chronic | ICD-10-CM

## 2024-03-18 PROCEDURE — G0277: CPT

## 2024-03-18 PROCEDURE — 82962 GLUCOSE BLOOD TEST: CPT

## 2024-03-18 PROCEDURE — 99183 HYPERBARIC OXYGEN THERAPY: CPT

## 2024-03-18 NOTE — PROCEDURE
[Outpatient] : Outpatient [Cane] : cane [THIS CHAMBER HAS BEEN CLEANED / DISINFECTED] : This chamber has been cleaned / disinfected according to local and hospital policy and procedure prior to this treatment. [____] : Post-Dive: Time - [unfilled] [___] : Post-Dive: Value - [unfilled] mg/dL [I have examined and evaluated this patient today, including ears and lungs and have cleared the patient] : I have examined and evaluated this patient today, including ears and lungs and have cleared the patient to continue HBOT as prescribed.  [I have ordered 1 HBOT session at the indicated protocol as seen below] : I have ordered 1 HBOT session at the indicated protocol as seen below [Patient demonstrated and verbalized proper technique for using air break mask] : Patient demonstrated and verbalized proper technique for using air break mask [Patient educated on the risks of SMOKING prior to HBOT with understanding] : Patient educated on the risks of SMOKING prior to HBOT with understanding [Patient educated on the risks of CONSUMING ALCOHOL prior to HBOT with understanding] : Patient educated on the risks of CONSUMING ALCOHOL prior to HBOT with understanding [] : No [100% Cotton] : 100% cotton [Empty all pockets] : empty all pockets [No hair oils, wigs, hairpieces, pins] : no hair oils, wigs, hairpieces, pins  [Pre tx medications] : pre tx medications  [No make-up, creams] : no make-up, creams  [No jewelry] : no jewelry  [No matches, cigarettes, lighters] : no matches, cigarettes, lighters  [Hearing aid removed] : hearing aid removed [Dentures removed] : dentures removed [Ground bracelet on pt's wrist] : ground bracelet on pt's wrist  [Contacts removed] : contacts removed  [Remove nail polish] : remove nail polish  [Bra, undergarments removed] : bra, undergarments removed  [No reading material] : no reading material  [No contraindicated dressings] : no contraindicated dressings [Ground Wire - VISUAL Verification - Intact/Free of Obstruction] : Ground Wire - VISUAL Verification - Intact/Free of Obstruction  [Ground Continuity - Verified < 1 ohm w/ Wrist Strap Moises] : Ground Continuity - Verified < 1 ohm w/ Wrist Strap Moises [Clear all fields] : clear all fields [Number: ___] : Number: [unfilled] [Diagnosis: ___] : Diagnosis: [unfilled] [FreeTextEntry1] : 2.0 jevon [FreeTextEntry3] : 90 min [FreeTextEntry5] : 4840 [FreeTextEntry7] : 0018 [FreeTextEntry9] : 6156 [de-identified] : 3206 [de-identified] : 110 min

## 2024-03-19 ENCOUNTER — OUTPATIENT (OUTPATIENT)
Dept: OUTPATIENT SERVICES | Facility: HOSPITAL | Age: 54
LOS: 1 days | End: 2024-03-19
Payer: MEDICARE

## 2024-03-19 ENCOUNTER — APPOINTMENT (OUTPATIENT)
Dept: HYPERBARIC MEDICINE | Facility: CLINIC | Age: 54
End: 2024-03-19
Payer: MEDICARE

## 2024-03-19 ENCOUNTER — APPOINTMENT (OUTPATIENT)
Dept: HYPERBARIC MEDICINE | Facility: CLINIC | Age: 54
End: 2024-03-19

## 2024-03-19 VITALS
HEART RATE: 80 BPM | OXYGEN SATURATION: 98 % | RESPIRATION RATE: 16 BRPM | SYSTOLIC BLOOD PRESSURE: 167 MMHG | DIASTOLIC BLOOD PRESSURE: 85 MMHG

## 2024-03-19 VITALS
HEART RATE: 87 BPM | DIASTOLIC BLOOD PRESSURE: 56 MMHG | SYSTOLIC BLOOD PRESSURE: 167 MMHG | RESPIRATION RATE: 16 BRPM | TEMPERATURE: 97.4 F | OXYGEN SATURATION: 98 %

## 2024-03-19 DIAGNOSIS — M79.606 PAIN IN LEG, UNSPECIFIED: ICD-10-CM

## 2024-03-19 DIAGNOSIS — Z89.422 ACQUIRED ABSENCE OF OTHER LEFT TOE(S): Chronic | ICD-10-CM

## 2024-03-19 DIAGNOSIS — Z95.810 PRESENCE OF AUTOMATIC (IMPLANTABLE) CARDIAC DEFIBRILLATOR: Chronic | ICD-10-CM

## 2024-03-19 PROCEDURE — 99183 HYPERBARIC OXYGEN THERAPY: CPT

## 2024-03-19 PROCEDURE — 82962 GLUCOSE BLOOD TEST: CPT

## 2024-03-19 PROCEDURE — G0277: CPT

## 2024-03-19 NOTE — PROCEDURE
[Outpatient] : Outpatient [Cane] : cane [THIS CHAMBER HAS BEEN CLEANED / DISINFECTED] : This chamber has been cleaned / disinfected according to local and hospital policy and procedure prior to this treatment. [____] : Post-Dive: Time - [unfilled] [___] : Post-Dive: Value - [unfilled] mg/dL [I have examined and evaluated this patient today, including ears and lungs and have cleared the patient] : I have examined and evaluated this patient today, including ears and lungs and have cleared the patient to continue HBOT as prescribed.  [I have ordered 1 HBOT session at the indicated protocol as seen below] : I have ordered 1 HBOT session at the indicated protocol as seen below [Patient demonstrated and verbalized proper technique for using air break mask] : Patient demonstrated and verbalized proper technique for using air break mask [Patient educated on the risks of SMOKING prior to HBOT with understanding] : Patient educated on the risks of SMOKING prior to HBOT with understanding [Patient educated on the risks of CONSUMING ALCOHOL prior to HBOT with understanding] : Patient educated on the risks of CONSUMING ALCOHOL prior to HBOT with understanding [100% Cotton] : 100% cotton [Empty all pockets] : empty all pockets [No hair oils, wigs, hairpieces, pins] : no hair oils, wigs, hairpieces, pins  [Pre tx medications] : pre tx medications  [No make-up, creams] : no make-up, creams  [No jewelry] : no jewelry  [No matches, cigarettes, lighters] : no matches, cigarettes, lighters  [Hearing aid removed] : hearing aid removed [Dentures removed] : dentures removed [Ground bracelet on pt's wrist] : ground bracelet on pt's wrist  [Contacts removed] : contacts removed  [Remove nail polish] : remove nail polish  [No reading material] : no reading material  [Bra, undergarments removed] : bra, undergarments removed  [No contraindicated dressings] : no contraindicated dressings [Ground Wire - VISUAL Verification - Intact/Free of Obstruction] : Ground Wire - VISUAL Verification - Intact/Free of Obstruction  [Ground Continuity - Verified < 1 ohm w/ Wrist Strap Moises] : Ground Continuity - Verified < 1 ohm w/ Wrist Strap Moises [Clear all fields] : clear all fields [Diagnosis: ___] : Diagnosis: [unfilled] [Number: ___] : Number: [unfilled] [] : No [FreeTextEntry1] : 2.0 jevon [FreeTextEntry5] : 9533 [FreeTextEntry3] : 90 min [FreeTextEntry7] : 2085 [FreeTextEntry9] : 3917 [de-identified] : 1000 [de-identified] : 110 min

## 2024-03-19 NOTE — ASSESSMENT

## 2024-03-19 NOTE — PROCEDURE
[Outpatient] : Outpatient [Cane] : cane [THIS CHAMBER HAS BEEN CLEANED / DISINFECTED] : This chamber has been cleaned / disinfected according to local and hospital policy and procedure prior to this treatment. [____] : Post-Dive: Time - [unfilled] [___] : Post-Dive: Value - [unfilled] mg/dL [I have examined and evaluated this patient today, including ears and lungs and have cleared the patient] : I have examined and evaluated this patient today, including ears and lungs and have cleared the patient to continue HBOT as prescribed.  [I have ordered 1 HBOT session at the indicated protocol as seen below] : I have ordered 1 HBOT session at the indicated protocol as seen below [Patient demonstrated and verbalized proper technique for using air break mask] : Patient demonstrated and verbalized proper technique for using air break mask [Patient educated on the risks of SMOKING prior to HBOT with understanding] : Patient educated on the risks of SMOKING prior to HBOT with understanding [Patient educated on the risks of CONSUMING ALCOHOL prior to HBOT with understanding] : Patient educated on the risks of CONSUMING ALCOHOL prior to HBOT with understanding [100% Cotton] : 100% cotton [Empty all pockets] : empty all pockets [Pre tx medications] : pre tx medications  [No hair oils, wigs, hairpieces, pins] : no hair oils, wigs, hairpieces, pins  [No jewelry] : no jewelry  [No make-up, creams] : no make-up, creams  [No matches, cigarettes, lighters] : no matches, cigarettes, lighters  [Dentures removed] : dentures removed [Hearing aid removed] : hearing aid removed [Ground bracelet on pt's wrist] : ground bracelet on pt's wrist  [Contacts removed] : contacts removed  [No reading material] : no reading material  [Remove nail polish] : remove nail polish  [Bra, undergarments removed] : bra, undergarments removed  [No contraindicated dressings] : no contraindicated dressings [Ground Wire - VISUAL Verification - Intact/Free of Obstruction] : Ground Wire - VISUAL Verification - Intact/Free of Obstruction  [Ground Continuity - Verified < 1 ohm w/ Wrist Strap Moises] : Ground Continuity - Verified < 1 ohm w/ Wrist Strap Moises [Clear all fields] : clear all fields [Diagnosis: ___] : Diagnosis: [unfilled] [Number: ___] : Number: [unfilled] [] : No [FreeTextEntry3] : 90 min [FreeTextEntry1] : 2.0 jevon [FreeTextEntry5] : 1577 [FreeTextEntry9] : 5740 [FreeTextEntry7] : 0950 [de-identified] : 8807 [de-identified] : 110 min

## 2024-03-20 ENCOUNTER — APPOINTMENT (OUTPATIENT)
Dept: HYPERBARIC MEDICINE | Facility: CLINIC | Age: 54
End: 2024-03-20

## 2024-03-20 ENCOUNTER — APPOINTMENT (OUTPATIENT)
Dept: HYPERBARIC MEDICINE | Facility: CLINIC | Age: 54
End: 2024-03-20
Payer: MEDICARE

## 2024-03-20 ENCOUNTER — OUTPATIENT (OUTPATIENT)
Dept: OUTPATIENT SERVICES | Facility: HOSPITAL | Age: 54
LOS: 1 days | End: 2024-03-20
Payer: MEDICARE

## 2024-03-20 ENCOUNTER — APPOINTMENT (OUTPATIENT)
Dept: INFECTIOUS DISEASE | Facility: CLINIC | Age: 54
End: 2024-03-20
Payer: MEDICARE

## 2024-03-20 VITALS
HEART RATE: 68 BPM | RESPIRATION RATE: 16 BRPM | DIASTOLIC BLOOD PRESSURE: 78 MMHG | SYSTOLIC BLOOD PRESSURE: 146 MMHG | TEMPERATURE: 98.1 F

## 2024-03-20 VITALS — DIASTOLIC BLOOD PRESSURE: 72 MMHG | SYSTOLIC BLOOD PRESSURE: 132 MMHG

## 2024-03-20 VITALS
WEIGHT: 190 LBS | OXYGEN SATURATION: 97 % | DIASTOLIC BLOOD PRESSURE: 69 MMHG | HEART RATE: 100 BPM | SYSTOLIC BLOOD PRESSURE: 142 MMHG | BODY MASS INDEX: 28.14 KG/M2 | TEMPERATURE: 97.8 F | HEIGHT: 69 IN

## 2024-03-20 DIAGNOSIS — M79.606 PAIN IN LEG, UNSPECIFIED: ICD-10-CM

## 2024-03-20 DIAGNOSIS — Z95.1 PRESENCE OF AORTOCORONARY BYPASS GRAFT: Chronic | ICD-10-CM

## 2024-03-20 DIAGNOSIS — Z28.21 IMMUNIZATION NOT CARRIED OUT BECAUSE OF PATIENT REFUSAL: ICD-10-CM

## 2024-03-20 PROCEDURE — 99214 OFFICE O/P EST MOD 30 MIN: CPT

## 2024-03-20 PROCEDURE — 99183 HYPERBARIC OXYGEN THERAPY: CPT

## 2024-03-20 PROCEDURE — 82962 GLUCOSE BLOOD TEST: CPT

## 2024-03-20 PROCEDURE — G0277: CPT

## 2024-03-20 NOTE — PROCEDURE
[Outpatient] : Outpatient [Cane] : cane [THIS CHAMBER HAS BEEN CLEANED / DISINFECTED] : This chamber has been cleaned / disinfected according to local and hospital policy and procedure prior to this treatment. [____] : Post-Dive: Time - [unfilled] [___] : Post-Dive: Value - [unfilled] mg/dL [I have examined and evaluated this patient today, including ears and lungs and have cleared the patient] : I have examined and evaluated this patient today, including ears and lungs and have cleared the patient to continue HBOT as prescribed.  [I have ordered 1 HBOT session at the indicated protocol as seen below] : I have ordered 1 HBOT session at the indicated protocol as seen below [Patient demonstrated and verbalized proper technique for using air break mask] : Patient demonstrated and verbalized proper technique for using air break mask [Patient educated on the risks of SMOKING prior to HBOT with understanding] : Patient educated on the risks of SMOKING prior to HBOT with understanding [Patient educated on the risks of CONSUMING ALCOHOL prior to HBOT with understanding] : Patient educated on the risks of CONSUMING ALCOHOL prior to HBOT with understanding [Empty all pockets] : empty all pockets [100% Cotton] : 100% cotton [No hair oils, wigs, hairpieces, pins] : no hair oils, wigs, hairpieces, pins  [Pre tx medications] : pre tx medications  [No jewelry] : no jewelry  [No make-up, creams] : no make-up, creams  [No matches, cigarettes, lighters] : no matches, cigarettes, lighters  [Hearing aid removed] : hearing aid removed [Dentures removed] : dentures removed [Ground bracelet on pt's wrist] : ground bracelet on pt's wrist  [Contacts removed] : contacts removed  [Remove nail polish] : remove nail polish  [No reading material] : no reading material  [Bra, undergarments removed] : bra, undergarments removed  [No contraindicated dressings] : no contraindicated dressings [Ground Wire - VISUAL Verification - Intact/Free of Obstruction] : Ground Wire - VISUAL Verification - Intact/Free of Obstruction  [Ground Continuity - Verified < 1 ohm w/ Wrist Strap Moises] : Ground Continuity - Verified < 1 ohm w/ Wrist Strap Moises [Clear all fields] : clear all fields [Diagnosis: ___] : Diagnosis: [unfilled] [Number: ___] : Number: [unfilled] [] : No [FreeTextEntry5] : 1200 [FreeTextEntry1] : 2.0 jevon [FreeTextEntry3] : 90 min [de-identified] : 2025 [FreeTextEntry9] : 2744 [FreeTextEntry7] : 1218 [de-identified] : 110 min

## 2024-03-20 NOTE — ASSESSMENT
[No change from previous assessment] : No change from previous assessment [Time MD/Provider assessed Patient:_______] : Time MD/Provider assessed Patient: [unfilled] [Patient prepared for dive] : Patient prepared for dive [Patient undergoing HBO treatment for __________] : Patient undergoing HBO treatment for [unfilled] [Patient descended without problem for 9 minutes] : Patient descended without problem for 9 minutes [No dizziness or thirst] :  No dizziness or thirst [No ear problems] : No ear problems [Vital signs stable] : Vital signs stable [Tolerating dive well] : Tolerating dive well [Respiratory Rate Stable] : Respiratory Rate Stable [No Chest Pain, shortness of breath] : No Chest Pain, shortness of breath [No chest pain, shortness of breath, or ear pain] :  No chest pain, shortness of breath, or ear pain  [Tolerated Ascent well] : Tolerated Ascent well [Vital Signs stable] : Vital Signs stable [A physician was present throughout the entire HBOT] : A physician was present throughout the entire HBOT [No] : No [Continue Treatment Plan] : Continue treatment plan [Clinically Stable] : Clinically stable [0] : 0 out of 10

## 2024-03-21 ENCOUNTER — APPOINTMENT (OUTPATIENT)
Dept: HYPERBARIC MEDICINE | Facility: CLINIC | Age: 54
End: 2024-03-21

## 2024-03-21 DIAGNOSIS — M86.331 CHRONIC MULTIFOCAL OSTEOMYELITIS, RIGHT RADIUS AND ULNA: ICD-10-CM

## 2024-03-21 DIAGNOSIS — E11.621 TYPE 2 DIABETES MELLITUS WITH FOOT ULCER: ICD-10-CM

## 2024-03-21 DIAGNOSIS — L97.522 NON-PRESSURE CHRONIC ULCER OF OTHER PART OF LEFT FOOT WITH FAT LAYER EXPOSED: ICD-10-CM

## 2024-03-21 DIAGNOSIS — M79.606 PAIN IN LEG, UNSPECIFIED: ICD-10-CM

## 2024-03-22 ENCOUNTER — OUTPATIENT (OUTPATIENT)
Dept: OUTPATIENT SERVICES | Facility: HOSPITAL | Age: 54
LOS: 1 days | End: 2024-03-22

## 2024-03-22 ENCOUNTER — APPOINTMENT (OUTPATIENT)
Dept: WOUND CARE | Facility: HOSPITAL | Age: 54
End: 2024-03-22
Payer: MEDICARE

## 2024-03-22 VITALS
HEART RATE: 91 BPM | DIASTOLIC BLOOD PRESSURE: 76 MMHG | TEMPERATURE: 97.1 F | OXYGEN SATURATION: 99 % | SYSTOLIC BLOOD PRESSURE: 137 MMHG | RESPIRATION RATE: 18 BRPM

## 2024-03-22 DIAGNOSIS — Z89.422 ACQUIRED ABSENCE OF OTHER LEFT TOE(S): Chronic | ICD-10-CM

## 2024-03-22 DIAGNOSIS — Z95.1 PRESENCE OF AORTOCORONARY BYPASS GRAFT: Chronic | ICD-10-CM

## 2024-03-22 PROCEDURE — 15275 SKIN SUB GRAFT FACE/NK/HF/G: CPT

## 2024-03-22 NOTE — REVIEW OF SYSTEMS
[Skin Wound] : skin wound [Negative] : Psychiatric [Skin Lesions] : no skin lesions [Itching] : no itching

## 2024-03-22 NOTE — ASSESSMENT
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 Patient completed course of Imipenem for 6 weeks with last dose being 1/27/24 s/p removal of RUE PICC line and on cipro for pseudomonal ppx. Patient still with wound of the LLE with wound that doesn't appear to be acutely infected.  Assessment and Plan: *Osteomyelitis of the R 3rd toe and L 1st toe with cultures growing pseudomonas, klebsiella ESBL and enterococcus - Completed approximately 1 month of Cipro 500mg PO Q12 and wound care/podiatry state no signs of infection, will hold off on further ppx abx as risks outweight the benefits at this time - continue wound care - continue surgical follow up, vascular intervention and bariatric therapy per surgical team  *Kidney transplant 7/16/23 CMV+/+, EBV +/+ with ATG induction currently on tacrolimus, mycophenolate and prednisone - continue follow up with transplant nephrology - no acute ID intervention at this time  *Refused Vaccination, refused again 3/20/24 - patient counseled on the importance of vaccination, patient currently refused vaccination, will attempt to revaccinate at follow up visit  *CAD s/p CABG - continued routine monitoring with cardiology  *Vitamin d deficiency - would likely benefit from supplementation, would defer to primary transplant team as supplements can interfere with immunosuppression absorption   Follow up: 2 months with Dr. Shaggy Souza routine follow up for osteo and renal transplant

## 2024-03-22 NOTE — HISTORY OF PRESENT ILLNESS
[FreeTextEntry1] : Patient is 53 year old male who presents for follow of his osteomyelitis. Patient was seen in December at the hospital and was given 6 weeks of Imipenem which completed 1/27/24 for osteo of the R 3rd toe and L 1st toe with cultures growing pseudomonas, klebsiella ESBL and enterococcus now s/p removal of RUE PICC which was placed on ciprofloxacin for pseudomonal ppx to prevent wound progression. Patient states he has been following with wound care and with his podiatrist and undergoing hyperbaric treatment as wel and his wound has been healing well. He also had a skin grafts placed to the area. He states he completed his ABx and didn't continue taking anymore. He finished approximately 2 weeks before appointment. He states he feels well without acute complaints. Refusing vaccinations at this time.  PMH: kidney transplant 7/16/23 CMV+/+, EBV +/+ with ATG induction currently on tacrolimus, mycophenolate and prednisone, CAD s/p CABG, PAD, vitamin D deficiency, DVT/swelling of LUE on eliquis  SH: lives with sister, retired used to work with [employer], 2015 years ago quit smoking 1 pack per week, Quit drinking in 2015, denies travel, denies sick contact

## 2024-03-22 NOTE — PHYSICAL EXAM
[General Appearance - In No Acute Distress] : in no acute distress [General Appearance - Alert] : alert [General Appearance - Well Nourished] : well nourished [General Appearance - Well-Appearing] : healthy appearing [PERRL With Normal Accommodation] : pupils were equal in size, round, reactive to light [Sclera] : the sclera and conjunctiva were normal [Hearing Threshold Finger Rub Not [place]] : hearing was normal [Outer Ear] : the ears and nose were normal in appearance [Oropharynx] : the oropharynx was normal with no thrush [Neck Appearance] : the appearance of the neck was normal [Neck Cervical Mass (___cm)] : no neck mass was observed [Jugular Venous Distention Increased] : there was no jugular-venous distention [Respiration, Rhythm And Depth] : normal respiratory rhythm and effort [Exaggerated Use Of Accessory Muscles For Inspiration] : no accessory muscle use [Auscultation Breath Sounds / Voice Sounds] : lungs were clear to auscultation bilaterally [Heart Sounds] : normal S1 and S2 [Heart Rate And Rhythm] : heart rate was normal and rhythm regular [Heart Sounds Gallop] : no gallops [Murmurs] : no murmurs [Heart Sounds Pericardial Friction Rub] : no pericardial rub [Bowel Sounds] : normal bowel sounds [Abdomen Soft] : soft [Abdomen Tenderness] : non-tender [] : no hepato-splenomegaly [Abdomen Mass (___ Cm)] : no abdominal mass palpated [Costovertebral Angle Tenderness] : no CVA tenderness [Cervical Lymph Nodes Enlarged Posterior Bilaterally] : posterior cervical [Cervical Lymph Nodes Enlarged Anterior Bilaterally] : anterior cervical [Supraclavicular Lymph Nodes Enlarged Bilaterally] : supraclavicular [Musculoskeletal - Swelling] : no joint swelling [Nail Clubbing] : no clubbing  or cyanosis of the fingernails [Motor Tone] : muscle strength and tone were normal [Oriented To Time, Place, And Person] : oriented to person, place, and time [Affect] : the affect was normal [FreeTextEntry1] : venous stasis of the lower extremities, skin changes around cabg scar, LLQ renal transplant scar, LLE with surgical dressing, LUE fistula

## 2024-03-22 NOTE — END OF VISIT
[] : Fellow [FreeTextEntry3] : Patient seen and examined I agree with Dr Souza history physical and palns as ntoed above feeling well wound healing with hyperbaric oxygen and local wound care, has completed six weeks of abx can monitor off abx at this point follow up 1-2 mos

## 2024-03-22 NOTE — PLAN
[FreeTextEntry1] : full thickness debridement of ulceration elft foot with sterile Curette down to the level of the subcutaneous tissue Patient was using santyl collagenase with gauze and julius daily but no dressing changes for past week after apligraf application Patient doing HBO therapy patient presents for follow up Apligraf  left foot s/p 1 week Explained to patient that since he does not have any revasc options according to Dr. 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 Malone regarding PVD continue daily dressing changes and surgical shoe left foot  Fibrotic appearance to wound but decreased size and depth noted but minimal bleeding and exposed bone noted with no signs of cellulitis after full thickness debridement improved appearance to wound with increased granular appearance. Patient told to return 1 week Plan for skin substitute. Apligraf  Type: apligraf left foot #3 44 cm used  Wound has shown recent improvement through Increased granulation tissue and decrease in wound size  Wound is not free from infection, since patient has chronic osteomyelitis but no signs of cellulitis minimal drainage and no necrotic tissue left foot  Patient has adequate blood supply as demonstrated by bleeding but patient has PVD  Patient is on a comprehensive diabetic management program:  Offloading measures (for DFU):CROW  Wound has been treated with standards of care for many weeks no signs of cellulitis return 1 week

## 2024-03-22 NOTE — HISTORY OF PRESENT ILLNESS
[FreeTextEntry1] : Patient presents with chief complaint of non-healing ulcer 1 metatarsal head left foot +PVD seeing Dr. Malone Right foot ulcer and surgery is completely healed. Non-healing wound left foot with osteomyelitis +DM with PVD Fibrogranular tissue medial 1 metatarsal head with poor healing potential left foot Right foot is completely healed with no signs of infection or wounds right foot PMH: Kidney disease replacement transplant CAD CABGx3 Exposed bone medial 1 metatarsal left foot Patient put on cipro for chronic OM by Dr. Moore ID No ascending cellulitis decreased edema 1 met left foot no erythema chronic osteomyelitis left foot patient is improving with HBO returns s/p apligraf and presents for another apligraf left foot for today Patient is seeing ID Patient ha CHRONIC Osteomyelitis left foot. Bone is palpable and unable to be resected since no vascular clearance Left message with ID that patient still has bone infection left foot patient is progressing well with apligraf and wound care

## 2024-03-29 ENCOUNTER — APPOINTMENT (OUTPATIENT)
Dept: WOUND CARE | Facility: HOSPITAL | Age: 54
End: 2024-03-29
Payer: MEDICARE

## 2024-03-29 VITALS
HEART RATE: 89 BPM | RESPIRATION RATE: 18 BRPM | DIASTOLIC BLOOD PRESSURE: 50 MMHG | TEMPERATURE: 97.6 F | SYSTOLIC BLOOD PRESSURE: 99 MMHG | OXYGEN SATURATION: 97 %

## 2024-03-29 PROCEDURE — 15275 SKIN SUB GRAFT FACE/NK/HF/G: CPT

## 2024-03-29 NOTE — PLAN
[FreeTextEntry1] : full thickness debridement of ulceration elft foot with sterile Curette down to the level of the subcutaneous tissue Patient was using santyl collagenase with gauze and julius daily but no dressing changes for past week after apligraf application Patient no longer doing HBO therapy patient presents for follow up Apligraf  left foot s/p 1 week Explained to patient that since he does not have any revasc options according to Dr. 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 Malone regarding PVD continue daily dressing changes and surgical shoe left foot  Fibrotic appearance to wound but decreased size and depth noted but minimal bleeding and exposed bone noted with no signs of cellulitis after full thickness debridement improved appearance to wound with increased granular appearance. Patient told to return 1 week Plan for skin substitute. Apligraf  Type: apligraf left foot #4 44 cm used  Wound has shown recent improvement through Increased granulation tissue and decrease in wound size  Wound is not free from infection, since patient has chronic osteomyelitis but no signs of cellulitis minimal drainage and no necrotic tissue left foot  Patient has adequate blood supply as demonstrated by bleeding but patient has PVD  Patient is on a comprehensive diabetic management program:  Offloading measures (for DFU):CROW  Wound has been treated with standards of care for many weeks no signs of cellulitis return 1 week

## 2024-04-01 NOTE — PROGRESS NOTE ADULT - PROVIDER SPECIALTY LIST ADULT
Internal Medicine HPI    Objective:     Intake/Output Summary (Last 24 hours) at 4/1/2024 1425  Last data filed at 4/1/2024 0422  Gross per 24 hour   Intake 480 ml   Output 1200 ml   Net -720 ml        Vitals:   Vitals:    04/01/24 0743 04/01/24 0744 04/01/24 0930 04/01/24 1417   BP:   (!) 132/59    Pulse: 60 61 66 69   Resp: 14 17 18 25   Temp:   98.2 °F (36.8 °C)    TempSrc:   Oral    SpO2: 92% 92% 92% 90%   Weight:       Height:             Physical Exam:      General: Afebrile, in distress  Eyes: EOMI  ENT: neck supple, no JVD  Cardiovascular: S1-S2 normal no murmur  Respiratory: Air entry good bilaterally bilateral expiratory wheeze heard  Gastrointestinal: Soft, nontender  bowel sounds normal  Genitourinary: no suprapubic tenderness  Musculoskeletal: No edema  Skin: warm, dry  Neuro: Alert.  Oriented no focal deficit  Psych: Mood appropriate.         Medications:   Medications:    fluconazole  100 mg Oral Daily    nystatin  5 mL Oral 4x Daily    budesonide-formoterol  2 puff Inhalation BID RT    tiotropium  2 puff Inhalation Daily RT    amLODIPine  5 mg Oral Daily    metoprolol tartrate  25 mg Oral BID    senna  1 tablet Oral Nightly    polyethylene glycol  17 g Oral Daily    gabapentin  600 mg Oral Once    lidocaine  1 patch TransDERmal Daily    lidocaine  1 patch TransDERmal Daily    nicotine  1 patch TransDERmal Daily    gabapentin  600 mg Oral Nightly    sodium chloride flush  5-40 mL IntraVENous 2 times per day    [Held by provider] clopidogrel  75 mg Oral Daily    pantoprazole  40 mg Oral QAM AC    ranolazine  500 mg Oral BID    rosuvastatin  20 mg Oral Daily    enoxaparin  30 mg SubCUTAneous Daily    busPIRone  5 mg Oral BID      Infusions:    sodium chloride 25 mL/hr at 03/30/24 1134     PRN Meds: ipratropium 0.5 mg-albuterol 2.5 mg, 1 Dose, Q4H PRN  hydrALAZINE, 10 mg, Q4H PRN  labetalol, 10 mg, Q4H PRN  sodium chloride flush, 5-40 mL, PRN  sodium chloride, , PRN  ondansetron, 4 mg, Q8H PRN   Or  ondansetron, 4 mg, Q6H     LABA1C 5.5 02/05/2024 05:25 AM     TSH:   Lab Results   Component Value Date/Time    TSH 2.50 07/28/2016 02:26 PM     Troponin:   Lab Results   Component Value Date/Time    TROPONINT <0.010 04/17/2020 11:00 AM     Lactic Acid: No results for input(s): \"LACTA\" in the last 72 hours.  BNP:   No results for input(s): \"PROBNP\" in the last 72 hours.    UA:  Lab Results   Component Value Date/Time    NITRU NEGATIVE 03/16/2024 04:15 AM    COLORU YELLOW 03/16/2024 04:15 AM    PHUR 5.5 02/22/2017 03:37 PM    WBCUA 0 TO 1 03/18/2016 03:00 PM    RBCUA NEGATIVE 03/18/2016 03:00 PM    MUCUS 1+ 09/30/2013 05:45 PM    BACTERIA LARGE 03/18/2016 03:00 PM    CLARITYU CLEAR 03/16/2024 04:15 AM    SPECGRAV 1.020 03/16/2024 04:15 AM    LEUKOCYTESUR NEGATIVE 03/16/2024 04:15 AM    UROBILINOGEN 0.2 03/16/2024 04:15 AM    BILIRUBINUR NEGATIVE 03/16/2024 04:15 AM    BILIRUBINUR Negative 02/22/2017 03:37 PM    BLOODU NEGATIVE 03/16/2024 04:15 AM    GLUCOSEU Negative 02/22/2017 03:37 PM    KETUA NEGATIVE 03/16/2024 04:15 AM     Urine Cultures: No results found for: \"LABURIN\"  Blood Cultures: No results found for: \"BC\"  No results found for: \"BLOODCULT2\"  Organism: No results found for: \"ORG\"      Electronically signed by Venus Marmolejo MD on 4/1/2024 at 2:25 PM

## 2024-04-01 NOTE — PROGRESS NOTE ADULT - ASSESSMENT
54 y/o M with past medical history significant for HTN, CHF (s/p AICD 2016), CAD (s/p CABG 2015), IDDM, PVD s/p stent x4 in RLE with R big toe/second toe amputation (2021) and ESRD on HD via LUE AVF for 6 years now s/p DDRT 7/16/2023 with Thymoglobulin induction.   Post-transplant course complicated by severe constipation requiring disimpaction, DGF requiring hemodialysis and LUE swelling with concern for L subclavian thrombus, now s/p IR fistulogram 7/7/23 with balloon angioplasty of subclavian vein.   discharged home on 7/27/23 and remained on HD MWF   He presents from home via EMS to [facility] on 8/7/23 with abdominal discomfort and no bowel movement for 3 days.     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

## 2024-04-05 ENCOUNTER — APPOINTMENT (OUTPATIENT)
Dept: WOUND CARE | Facility: HOSPITAL | Age: 54
End: 2024-04-05
Payer: MEDICARE

## 2024-04-05 DIAGNOSIS — M86.60 OTHER CHRONIC OSTEOMYELITIS, UNSPECIFIED SITE: ICD-10-CM

## 2024-04-05 PROCEDURE — 11042 DBRDMT SUBQ TIS 1ST 20SQCM/<: CPT

## 2024-04-05 NOTE — PLAN
[FreeTextEntry1] : full thickness debridement of ulceration elft foot with sterile Curette down to the level of the subcutaneous tissue bone palpable Patient was using santyl collagenase with gauze and julius daily but no dressing changes for past week after apligraf application Soupy appearance to area with no signs of cellulitis Patient no longer doing HBO therapy patient presents for follow up Apligraf  left foot s/p 1 week another apligraf not recommended due to soupy appearance consider another apligraf next week Explained to patient that since he does not have any revasc options according to Dr. 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 Malone regarding PVD continue daily dressing changes and surgical shoe left foot  Fibrotic appearance to wound but decreased size and depth noted but minimal bleeding and exposed bone noted with no signs of cellulitis after full thickness debridement improved appearance to wound with increased granular appearance. Patient told to return 1 week Continue with  surgical shoe Rx Cam walker DM walker refer to alycia briggs patient to be evakluated for CROW Apply E02 Wound has been treated with standards of care for many weeks no signs of cellulitis return 1 week

## 2024-04-05 NOTE — HISTORY OF PRESENT ILLNESS
[FreeTextEntry1] : Patient presents with chief complaint of non-healing ulcer 1 metatarsal head left foot +PVD seeing Dr. Malone this week Right foot ulcer and surgery is completely healed. Non-healing wound left foot with osteomyelitis +DM with PVD Fibrogranular tissue medial 1 metatarsal head with poor healing potential left foot Right foot is completely healed with no signs of infection or wounds right foot PMH: Kidney disease replacement transplant CAD CABGx3 Exposed bone medial 1 metatarsal left foot Patient put on cipro for chronic OM by Dr. Moore ID No ascending cellulitis decreased edema 1 met left foot no erythema chronic osteomyelitis left foot patient is improving with HBO returns s/p apligraf and presents for another apligraf left foot for today Patient is seeing ID Patient ha CHRONIC Osteomyelitis left foot. Bone is palpable and unable to be resected since no vascular clearance patient is progressing well with wound care

## 2024-04-09 NOTE — PRE-OP CHECKLIST - SIDE RAILS UP
Assessment/Plan:    Diagnoses and all orders for this visit:    Nummular eczema  -     hydrocortisone 2.5 % cream; Apply topically 2 (two) times a day for 7 days    Other mucopurulent conjunctivitis of both eyes  -     polymyxin b-trimethoprim (POLYTRIM) ophthalmic solution; Administer 1 drop to both eyes every 6 (six) hours for 7 days          Subjective:     History provided by: mother    Patient ID: Hetal Tenorio is a 3 y.o. female    HPI  Here with c/o rash x 1 week, pink eye x 1 day  Mother has noted multiple circular patches on her upper and lower limbs.  She has a history of eczema but has been asymptomatic until now  Not much itching noted.    She was noted to have pink, watery eyes this morning.  Contact with sibling s with conjunctivitis    No fever  Otherwise in her usual state of health      The following portions of the patient's history were reviewed and updated as appropriate: allergies, current medications, past family history, past medical history, past social history, past surgical history, and problem list.    Review of Systems   Constitutional:  Negative for chills and fever.   HENT:  Negative for congestion, ear pain, rhinorrhea and sore throat.    Eyes:  Positive for discharge and redness. Negative for pain.   Respiratory:  Negative for cough and wheezing.    Cardiovascular:  Negative for chest pain and leg swelling.   Gastrointestinal:  Negative for abdominal pain and vomiting.   Genitourinary:  Negative for frequency and hematuria.   Musculoskeletal:  Negative for gait problem and joint swelling.   Skin:  Positive for rash. Negative for color change.   Neurological:  Negative for seizures and syncope.   All other systems reviewed and are negative.    Objective:    Vitals:    04/09/24 0958   Temp: 97.6 °F (36.4 °C)   TempSrc: Tympanic   Weight: 12.2 kg (26 lb 12.8 oz)       Physical Exam  Constitutional:       General: She is active. She is not in acute distress.     Appearance: She is  well-developed.   HENT:      Head: Normocephalic and atraumatic.      Right Ear: Tympanic membrane and ear canal normal. Tympanic membrane is not erythematous.      Left Ear: Tympanic membrane and ear canal normal. Tympanic membrane is not erythematous.      Nose: No congestion or rhinorrhea.      Mouth/Throat:      Pharynx: No oropharyngeal exudate or posterior oropharyngeal erythema.   Eyes:      General:         Right eye: Discharge present.         Left eye: Discharge present.     Pupils: Pupils are equal, round, and reactive to light.      Comments: Bilateral conjunctival injection with matted lashes   Cardiovascular:      Rate and Rhythm: Normal rate.      Pulses: Normal pulses.      Heart sounds: Normal heart sounds. No murmur heard.  Pulmonary:      Effort: Pulmonary effort is normal. No respiratory distress.      Breath sounds: Normal breath sounds. No wheezing.   Abdominal:      General: Abdomen is flat.      Palpations: Abdomen is soft. There is no mass.      Tenderness: There is no abdominal tenderness.   Musculoskeletal:         General: Normal range of motion.   Lymphadenopathy:      Cervical: No cervical adenopathy.   Skin:     General: Skin is warm and dry.      Capillary Refill: Capillary refill takes less than 2 seconds.      Findings: No rash.      Comments: Multiple coin like lesions with no central clearing or raised borders   Neurological:      General: No focal deficit present.      Mental Status: She is alert.      done

## 2024-04-12 ENCOUNTER — APPOINTMENT (OUTPATIENT)
Dept: WOUND CARE | Facility: HOSPITAL | Age: 54
End: 2024-04-12
Payer: MEDICARE

## 2024-04-12 PROCEDURE — 11042 DBRDMT SUBQ TIS 1ST 20SQCM/<: CPT

## 2024-04-16 ENCOUNTER — APPOINTMENT (OUTPATIENT)
Dept: VASCULAR SURGERY | Facility: CLINIC | Age: 54
End: 2024-04-16
Payer: MEDICARE

## 2024-04-16 VITALS
WEIGHT: 188 LBS | BODY MASS INDEX: 27.85 KG/M2 | HEIGHT: 69 IN | SYSTOLIC BLOOD PRESSURE: 147 MMHG | DIASTOLIC BLOOD PRESSURE: 72 MMHG | HEART RATE: 82 BPM | TEMPERATURE: 98.5 F

## 2024-04-16 PROCEDURE — 99214 OFFICE O/P EST MOD 30 MIN: CPT

## 2024-04-16 PROCEDURE — 93923 UPR/LXTR ART STDY 3+ LVLS: CPT

## 2024-04-16 PROCEDURE — ZZZZZ: CPT

## 2024-04-16 RX ORDER — CILOSTAZOL 50 MG/1
50 TABLET ORAL
Qty: 180 | Refills: 3 | Status: ACTIVE | COMMUNITY
Start: 2024-04-16 | End: 1900-01-01

## 2024-04-16 RX ORDER — FAMOTIDINE 20 MG/1
20 TABLET, FILM COATED ORAL
Qty: 90 | Refills: 3 | Status: ACTIVE | COMMUNITY
Start: 2023-07-17 | End: 1900-01-01

## 2024-04-16 RX ORDER — LANCETS 33 GAUGE
EACH MISCELLANEOUS
Qty: 2 | Refills: 3 | Status: ACTIVE | COMMUNITY
Start: 2023-10-24 | End: 1900-01-01

## 2024-04-16 NOTE — DISCHARGE NOTE NURSING/CASE MANAGEMENT/SOCIAL WORK - NSDCVIVACCINE_GEN_ALL_CORE_FT
No Vaccines Administered.
Detail Level: Zone
Initiate Treatment: Clobetasol 0.05% cream BID until clear and then PRN flares
Plan: Recommended VI Peel; pt will schedule appt with Dejah Cline.

## 2024-04-16 NOTE — PHYSICAL EXAM
[1+] : left 1+ [Alert] : alert [Oriented to Person] : oriented to person [Oriented to Place] : oriented to place [Oriented to Time] : oriented to time [Calm] : calm [de-identified] : nad [de-identified] : wnl [FreeTextEntry1] : foot dressing c/d/i moderate art insuff w moderate trophic skin changes  [de-identified] : wnl [de-identified] : Luis Cranial nerves 2-12 luis grossly intact [de-identified] : cooperative

## 2024-04-16 NOTE — HISTORY OF PRESENT ILLNESS
[FreeTextEntry1] : 50 y/o m w/ multiple comorbidities including but not limited to CHF, AICD, CAD, CABG, DM and ESRD on HD via LUE AVF (by Dr Malone in 2017) seen in hospital for right foot hallux OM. He is s/p right leg angio w/ Dr Malone on 1/14/22 and stents were placed to the mid and distal SFA, Pop and PT Trunk. Then underwent right foot hallux resection w/ Dr Meyrs on 1/17/22. He denies any new complaints. Denies claudication or rest pain. Denies fever or chills. He completed course of ABx. He is taking ASA 81, Statin, Plavix and Pletal 50mg BID.  [de-identified] : pt states  pletal 50 bid has been d/c by renal transplant service  pt is f/u w Dr Myers   for foot woundcare

## 2024-04-16 NOTE — DATA REVIEWED
[FreeTextEntry1] : 2/17/2022 JAN: Right JAN 1.22 Left JAN 0.71 consistent w/ moderate arterial disease on the left  4/11/2023 JAN/PVR RLE mod infra geniculate and LLE mod  infra geniculate arterial insuff                                    w vessel calcification                                  Rt JAN  nc Lt JAN  NC                               4/11/2023 Aorto Iliac Duplex  AA patent,   patent luis iliac arterial system                                 4/18/2023 Carotid Duplex  Rt ICA  less 50% stenosis  by velocity criteria                          Lt  ICA  less 50% stenosis  by velocity criteria                          Luis Ant Vertebral Arterial Flow   4/16/2024  JAN/PVR RLE mod infra geniculate and LLE mod  infra geniculate arterial insuff                                    w vessel calcification                                  Rt JAN  1.42 Lt JAN  NC

## 2024-04-16 NOTE — ASSESSMENT
[Arterial/Venous Disease] : arterial/venous disease [Medication Management] : medication management [Foot care/Footwear] : foot care/footwear [FreeTextEntry1] : Impression arterial insufficiency  w  foot ulcer/wound    Medical Conservative Management skin/foot protection measures, walking/exercise as tolerated Continue ASA, Pletal and Plavix Pt cleared by cards for Pletal, spoke w/ Dr Braga  pt is cleared by  renal transplant Dr MIKHAIL Hairston MD  to restart pletal 50 bid  ov w natalia/pvr s/o art insuff 12 mo april 2025 ov in 1-2 mo to re eval f/u w Dr Myers d/w pt indications risks and benefits of le angio poss intervention and the renal risks Pt  will f/u w Dr Myers and Dr Hairston  and  call back w decision  advised pt  will only undergo foot surg for om  by Dr Myers following  le angio by me  pt has to accept  renat transplant risk pt is aware if he continue to hold off on angio intervention  there is the possibility  the foot will deteriorate to the point of le amputation indication     [Ulcer Care] : ulcer care

## 2024-04-17 ENCOUNTER — APPOINTMENT (OUTPATIENT)
Dept: NEPHROLOGY | Facility: CLINIC | Age: 54
End: 2024-04-17
Payer: MEDICARE

## 2024-04-17 VITALS
SYSTOLIC BLOOD PRESSURE: 144 MMHG | WEIGHT: 190 LBS | BODY MASS INDEX: 28.14 KG/M2 | HEIGHT: 69 IN | RESPIRATION RATE: 18 BRPM | TEMPERATURE: 98.3 F | HEART RATE: 94 BPM | DIASTOLIC BLOOD PRESSURE: 73 MMHG | OXYGEN SATURATION: 98 %

## 2024-04-17 LAB
25(OH)D3 SERPL-MCNC: 15.7 NG/ML
ALBUMIN SERPL ELPH-MCNC: 4.1 G/DL
ALP BLD-CCNC: 92 U/L
ALT SERPL-CCNC: 15 U/L
ANION GAP SERPL CALC-SCNC: 10 MMOL/L
AST SERPL-CCNC: 13 U/L
BILIRUB SERPL-MCNC: 0.2 MG/DL
BUN SERPL-MCNC: 24 MG/DL
CALCIUM SERPL-MCNC: 10.4 MG/DL
CALCIUM SERPL-MCNC: 10.4 MG/DL
CHLORIDE SERPL-SCNC: 107 MMOL/L
CHOLEST SERPL-MCNC: 122 MG/DL
CO2 SERPL-SCNC: 25 MMOL/L
CREAT SERPL-MCNC: 1.09 MG/DL
EGFR: 82 ML/MIN/1.73M2
ESTIMATED AVERAGE GLUCOSE: 111 MG/DL
GLUCOSE SERPL-MCNC: 87 MG/DL
HBA1C MFR BLD HPLC: 5.5 %
HCT VFR BLD CALC: 37.7 %
HDLC SERPL-MCNC: 58 MG/DL
HGB BLD-MCNC: 12 G/DL
LDH SERPL-CCNC: 145 U/L
LDLC SERPL CALC-MCNC: 48 MG/DL
MAGNESIUM SERPL-MCNC: 1.8 MG/DL
MCHC RBC-ENTMCNC: 28.2 PG
MCHC RBC-ENTMCNC: 31.8 GM/DL
MCV RBC AUTO: 88.5 FL
NONHDLC SERPL-MCNC: 64 MG/DL
PARATHYROID HORMONE INTACT: 150 PG/ML
PHOSPHATE SERPL-MCNC: 2.8 MG/DL
PLATELET # BLD AUTO: 162 K/UL
POTASSIUM SERPL-SCNC: 4.3 MMOL/L
PROT SERPL-MCNC: 5.8 G/DL
RBC # BLD: 4.26 M/UL
RBC # FLD: 13.9 %
SODIUM SERPL-SCNC: 141 MMOL/L
TACROLIMUS SERPL-MCNC: 5 NG/ML
TRIGL SERPL-MCNC: 85 MG/DL
URATE SERPL-MCNC: 6.5 MG/DL
WBC # FLD AUTO: 3.46 K/UL

## 2024-04-17 PROCEDURE — 99214 OFFICE O/P EST MOD 30 MIN: CPT

## 2024-04-17 RX ORDER — TACROLIMUS 4 MG/1
4 TABLET, EXTENDED RELEASE ORAL
Qty: 90 | Refills: 3 | Status: DISCONTINUED | COMMUNITY
Start: 2023-07-17 | End: 2024-04-17

## 2024-04-17 RX ORDER — ERGOCALCIFEROL 1.25 MG/1
1.25 MG CAPSULE ORAL
Qty: 4 | Refills: 1 | Status: ACTIVE | COMMUNITY
Start: 2024-04-17 | End: 1900-01-01

## 2024-04-17 NOTE — HISTORY OF PRESENT ILLNESS
[FreeTextEntry1] : He received DDRT 7/16/23, Thymo induction. DGF, Recovered. 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 His ureter stent was removed on 9/5/23. Labs done today.   He has left foot dorsum of big toe wound, Was on Hyperbaric chamber treatment for over 4 weeks. Has Podiatry follow up. Now uses a regional oxygen therapy using home equipment. He had podiatry visit with Dr. Myers. Has vascular follow up with dr. Mlaone. He had left UE AVF closed  on 9/8 at [facility]. He follows with Dr. Jose Braga for heart disease care. He has AICD. He had labs today.  Home glucose controlled.     No SOB. Home blood pressure is controlled.   No fever. No hematuria   Medications	 aspirin 81 mg oral delayed release tablet: 1 tab(s) orally once a day atorvastatin 40 mg oral tablet: 1 tab(s) orally once a day (at bedtime) carvedilol 3.125 mg oral tablet: 1 tab(s) orally every 12 hours HumaLOG KwikPen 100 units/mL injectable solution: 8-10 unit(s) injectable 3 times a day (before meals) insulin glargine 100 units/mL subcutaneous solution: 21 unit(s) subcutaneous once a day (at bedtime) mycophenolate mofetil   500 milligram(s) orally 2 times a day Pepcid 20 mg oral tablet: 1 tab(s) orally once a day predniSONE   1 tab(s) orally once a day-  senna leaf extract oral tablet: 2 tab(s) orally once a day (at bedtime) sulfamethoxazole-trimethoprim 400 mg-80 mg oral tablet: 1 tab(s) orally once a day- will restart, wbc count has recovered Envarsus: 2 mg once a day  Pletal (Started this week) Furosemide- not taking No longer on Eliquis. No longer on Cipro     Medication list reviewed with patient. He reports he is off Eliquis, has been on f/u with Dr. Braga, cardiologist.  He is on a new antihypertensive in addition, unsure name. Started by Dr. Braga

## 2024-04-17 NOTE — DISCHARGE NOTE PROVIDER - PROVIDER TOKENS
Health Maintenance       Chlamydia and Gonorrhea Screening (if sexually active) (Yearly)  Never done    COVID-19 Vaccine (1 - 2023-24 season)  Never done           Following review of the above:  Patient is not proceeding with: Chlamydia and Gonorrhea Screening and COVID-19    Note: Refer to final orders and clinician documentation.        PROVIDER:[TOKEN:[[id number]]] PROVIDER:[TOKEN:[[id number]]]

## 2024-04-18 LAB
BKV DNA SPEC QL NAA+PROBE: NOT DETECTED IU/ML
CMV DNA SPEC QL NAA+PROBE: ABNORMAL IU/ML
CMVPCR LOG: ABNORMAL LOG10IU/ML

## 2024-04-18 NOTE — HISTORY OF PRESENT ILLNESS
[FreeTextEntry1] : Patient presents with chief complaint of non-healing ulcer 1 metatarsal head left foot +PVD seeing Dr. Malone this week Right foot ulcer and surgery is completely healed. Non-healing wound left foot with osteomyelitis +DM with PVD Fibrogranular tissue medial 1 metatarsal head with poor healing potential left foot Right foot is completely healed with no signs of infection or wounds right foot PMH: Kidney disease replacement transplant CAD CABGx3 Exposed bone medial 1 metatarsal left foot Patient put on cipro for chronic OM by Dr. Moore ID No ascending cellulitis decreased edema 1 met left foot no erythema chronic osteomyelitis left foot patient is improving with HBO returns s/p apligraf and presents for another apligraf left foot for today Patient is seeing ID Patient ha CHRONIC Osteomyelitis left foot. Bone is palpable and unable to be resected since no vascular clearance patient is progressing well with wound care  PATIENT USING eo2 BUT NOT CHANGING EO2 pad daily macerated tissue around 1 metatarsal head left foot

## 2024-04-18 NOTE — PLAN
[FreeTextEntry1] : full thickness debridement of ulceration elft foot with sterile Curette down to the level of the subcutaneous tissue bone palpable Patient was using santyl collagenase with gauze and julius daily but no dressing changes for past week after apligraf application Soupy appearance to area with no signs of cellulitis Patient no longer doing HBO therapy patient presents for follow up Apligraf  left foot s/p 1 week another apligraf not recommended due to macerated appearance Explained to patient that since he does not have any revasc options according to Dr. 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 Malone regarding PVD continue daily dressing changes and surgical shoe left foot  Fibrotic appearance to wound but decreased size and depth noted but minimal bleeding and exposed bone noted with no signs of cellulitis after full thickness debridement improved appearance to wound with increased granular appearance. Patient told to return 1 week Continue with  surgical shoe Rx Cam walker DM walker refer to alycia briggs patient to be evaluated for CROW  Patient is ambulatory. Patient requires a custom ankle foot orthosis with prosthetic socket and Haynes bottom to unweight distal components of foot, stabilize intra articular surfaces, and low density lining to protect prominent aretha areas of effected skin. Patient will need the device for a term of greater than 9 months. no reasonable prefabricated orthosis exists. Apply E02 Wound has been treated with standards of care for many weeks no signs of cellulitis await Dr. Malone evaluation and possible revasc return 2 weeks

## 2024-04-24 NOTE — H&P ADULT. - RESPIRATORY AND THORAX
Detail Level: Zone Quality 226: Preventive Care And Screening: Tobacco Use: Screening And Cessation Intervention: Patient screened for tobacco use and is an ex/non-smoker Quality 431: Preventive Care And Screening: Unhealthy Alcohol Use - Screening: Patient did not receive brief counseling if identified as an unhealthy alcohol user details…

## 2024-04-26 ENCOUNTER — OUTPATIENT (OUTPATIENT)
Dept: OUTPATIENT SERVICES | Facility: HOSPITAL | Age: 54
LOS: 1 days | End: 2024-04-26
Payer: MEDICARE

## 2024-04-26 ENCOUNTER — APPOINTMENT (OUTPATIENT)
Dept: WOUND CARE | Facility: HOSPITAL | Age: 54
End: 2024-04-26
Payer: MEDICARE

## 2024-04-26 VITALS — TEMPERATURE: 97.6 F | HEIGHT: 69 IN | BODY MASS INDEX: 27.99 KG/M2 | WEIGHT: 189 LBS

## 2024-04-26 DIAGNOSIS — L97.524 NON-PRESSURE CHRONIC ULCER OF OTHER PART OF LEFT FOOT WITH NECROSIS OF BONE: ICD-10-CM

## 2024-04-26 DIAGNOSIS — Z89.422 ACQUIRED ABSENCE OF OTHER LEFT TOE(S): Chronic | ICD-10-CM

## 2024-04-26 DIAGNOSIS — Z95.810 PRESENCE OF AUTOMATIC (IMPLANTABLE) CARDIAC DEFIBRILLATOR: Chronic | ICD-10-CM

## 2024-04-26 DIAGNOSIS — Z95.1 PRESENCE OF AORTOCORONARY BYPASS GRAFT: Chronic | ICD-10-CM

## 2024-04-26 PROCEDURE — 11042 DBRDMT SUBQ TIS 1ST 20SQCM/<: CPT

## 2024-04-26 NOTE — HISTORY OF PRESENT ILLNESS
[FreeTextEntry1] : Patient presents with chief complaint of non-healing ulcer 1 metatarsal head left foot +PVD seeing Dr. Malone this week Right foot ulcer and surgery is completely healed. Non-healing wound left foot with osteomyelitis +DM with PVD Fibrogranular tissue medial 1 metatarsal head with poor healing potential left foot Right foot is completely healed with no signs of infection or wounds right foot PMH: Kidney disease replacement transplant CAD CABGx3 Exposed bone medial 1 metatarsal left foot Patient put on cipro for chronic OM by Dr. Moore ID No ascending cellulitis decreased edema 1 met left foot no erythema chronic osteomyelitis left foot patient is improving with HBO returns s/p apligraf and presents for another apligraf left foot for today Patient is seeing ID Patient ha CHRONIC Osteomyelitis left foot. Bone is palpable and unable to be resected since no vascular clearance patient is progressing well with wound care  Patient saw Dr. Malone who relates PVD in presence of bone infection left foot

## 2024-04-26 NOTE — PLAN
[FreeTextEntry1] : full thickness debridement of ulceration elft foot with sterile Curette down to the level of the subcutaneous tissue bone palpable Patient was using santyl collagenase with gauze and julius daily but no dressing changes for past week after apligraf application No more Soupy appearance to area with no signs of cellulitis Patient no longer doing HBO therapy consider another apligraf in fuutre Explained to patient that since he does not have any revasc options according to Dr. Malone and that he has exposed bone left foot with chronic osteomyelitis that he is at very high risk for BKA Dr. Malone will not do revasc since patient is refusing 1st ray resection left foot Patient refused angiogram with dye to reduce risk of damage to kidneys patient refuses amputation of left big toe Patient understands that if area becomes infected with sepsis that he will need BKA left leg Patient to follow up with Dr. Malone regarding PVD continue daily dressing changes and surgical shoe left foot  Fibrotic appearance to wound but decreased size and depth noted but minimal bleeding and exposed bone noted with no signs of cellulitis after full thickness debridement improved appearance to wound with increased granular appearance. Patient told to return 1 week Continue with  surgical shoe Rx Cam walker DM walker refer to alycia briggs patient to be evakluated for CROW Recopmmencynthia Apply E02 daily Wound has been treated with standards of care for many weeks no signs of cellulitis but palpable bone and chronic osteomyelitis left foot return 2 weeks

## 2024-05-10 ENCOUNTER — APPOINTMENT (OUTPATIENT)
Dept: WOUND CARE | Facility: HOSPITAL | Age: 54
End: 2024-05-10
Payer: MEDICARE

## 2024-05-10 ENCOUNTER — OUTPATIENT (OUTPATIENT)
Dept: OUTPATIENT SERVICES | Facility: HOSPITAL | Age: 54
LOS: 1 days | End: 2024-05-10
Payer: MEDICARE

## 2024-05-10 DIAGNOSIS — I73.9 PERIPHERAL VASCULAR DISEASE, UNSPECIFIED: ICD-10-CM

## 2024-05-10 DIAGNOSIS — Z95.1 PRESENCE OF AORTOCORONARY BYPASS GRAFT: Chronic | ICD-10-CM

## 2024-05-10 DIAGNOSIS — Z95.810 PRESENCE OF AUTOMATIC (IMPLANTABLE) CARDIAC DEFIBRILLATOR: Chronic | ICD-10-CM

## 2024-05-10 DIAGNOSIS — Z89.422 ACQUIRED ABSENCE OF OTHER LEFT TOE(S): Chronic | ICD-10-CM

## 2024-05-10 PROCEDURE — 11042 DBRDMT SUBQ TIS 1ST 20SQCM/<: CPT

## 2024-05-10 NOTE — PLAN
[FreeTextEntry1] : full thickness debridement of ulceration elft foot with sterile Curette down to the level of the subcutaneous tissue bone palpable Patient was using santyl collagenase with gauze and julius daily but no dressing changes for past week after apligraf application No more Soupy appearance to area with no signs of cellulitis Patient no longer doing HBO therapy consider another apligraf in fuutre Explained to patient that since he does not have any revasc options according to Dr. Malone and that he has exposed bone left foot with chronic osteomyelitis that he is at very high risk for BKA Dr. Malone will not do revasc since patient is refusing 1st ray resection left foot Patient refused angiogram with dye to reduce risk of damage to kidneys patient refuses amputation of left big toe Patient understands that if area becomes infected with sepsis that he will need BKA left leg Patient to follow up with Dr. Malone regarding PVD continue daily dressing changes and surgical shoe left foot  Fibrotic appearance to wound but decreased size and depth noted but minimal bleeding and exposed bone noted with no signs of cellulitis after full thickness debridement improved appearance to wound with increased granular appearance. Continue with  surgical shoe Rx Cam walker DM walker refer to alycia briggs patient to be evakluated for [place] Recommend Apply E02 daily Wound has been treated with standards of care for many weeks no signs of cellulitis but palpable bone and chronic osteomyelitis left foot return 2 weeks

## 2024-05-17 DIAGNOSIS — L97.524 NON-PRESSURE CHRONIC ULCER OF OTHER PART OF LEFT FOOT WITH NECROSIS OF BONE: ICD-10-CM

## 2024-05-17 DIAGNOSIS — E11.9 TYPE 2 DIABETES MELLITUS WITHOUT COMPLICATIONS: ICD-10-CM

## 2024-05-17 DIAGNOSIS — I77.1 STRICTURE OF ARTERY: ICD-10-CM

## 2024-05-20 RX ORDER — CARVEDILOL 3.12 MG/1
3.12 TABLET, FILM COATED ORAL TWICE DAILY
Qty: 60 | Refills: 3 | Status: ACTIVE | COMMUNITY
Start: 2023-07-19 | End: 1900-01-01

## 2024-05-24 ENCOUNTER — APPOINTMENT (OUTPATIENT)
Dept: WOUND CARE | Facility: HOSPITAL | Age: 54
End: 2024-05-24
Payer: MEDICARE

## 2024-05-24 VITALS
OXYGEN SATURATION: 98 % | SYSTOLIC BLOOD PRESSURE: 168 MMHG | TEMPERATURE: 97.5 F | RESPIRATION RATE: 18 BRPM | HEART RATE: 94 BPM | DIASTOLIC BLOOD PRESSURE: 69 MMHG

## 2024-05-24 DIAGNOSIS — L97.529 NON-PRESSURE CHRONIC ULCER OF OTHER PART OF LEFT FOOT WITH UNSPECIFIED SEVERITY: ICD-10-CM

## 2024-05-24 DIAGNOSIS — M86.9 OSTEOMYELITIS, UNSPECIFIED: ICD-10-CM

## 2024-05-24 PROCEDURE — 11042 DBRDMT SUBQ TIS 1ST 20SQCM/<: CPT

## 2024-05-24 NOTE — HISTORY OF PRESENT ILLNESS
[FreeTextEntry1] : Patient presents with chief complaint of non-healing ulcer 1 metatarsal head left foot +PVD seeing Dr. Malone this week Right foot ulcer and surgery is completely healed. Non-healing wound left foot with osteomyelitis +DM with PVD Fibrogranular tissue medial 1 metatarsal head with poor healing potential left foot Right foot is completely healed with no signs of infection or wounds right foot PMH: Kidney disease replacement transplant CAD CABGx3 Exposed bone medial 1 metatarsal left foot Patient put on cipro for chronic OM by Dr. Moore ID No ascending cellulitis decreased edema 1 met left foot no erythema chronic osteomyelitis left foot Patient ha CHRONIC Osteomyelitis left foot. Bone is palpable and unable to be resected since no vascular clearance patient is progressing well with wound care but palpable bone still noted medial left 1 metatarsal head with poor prognosis Patient saw Dr. Malone who relates PVD in presence of bone infection left foot

## 2024-05-24 NOTE — PLAN
[FreeTextEntry1] : full thickness debridement of ulceration elft foot with sterile Curette down to the level of the subcutaneous tissue bone palpable Patient was using santyl collagenase with gauze and julius daily  No more Soupy appearance to area with no signs of cellulitis Patient no longer doing HBO therapy consider another apligraf in future Explained to patient that since he does not have any revasc options according to Dr. Malone and that he has exposed bone left foot with chronic osteomyelitis that he is at very high risk for BKA Dr. Malone will not do revasc since patient is refusing 1st ray resection left foot Patient refused angiogram with dye to reduce risk of damage to kidneys patient refuses amputation of left big toe Patient understands that if area becomes infected with sepsis that he will need BKA left leg Patient to follow up with Dr. Malone regarding PVD continue daily dressing changes and surgical shoe left foot  Fibrotic appearance to wound but decreased size and depth noted but minimal bleeding and exposed bone noted with no signs of cellulitis after full thickness debridement improved appearance to wound with increased granular appearance. Continue with  surgical shoe Rx Cam walker DM walker refer to alycia briggs patient to be evakluated for RENÉE Recommend Apply E02 daily Wound has been treated with standards of care for many weeks no signs of cellulitis but palpable bone and chronic osteomyelitis left foot return 2 weeks

## 2024-05-29 ENCOUNTER — APPOINTMENT (OUTPATIENT)
Dept: NEPHROLOGY | Facility: CLINIC | Age: 54
End: 2024-05-29
Payer: MEDICARE

## 2024-05-29 VITALS
HEIGHT: 69 IN | OXYGEN SATURATION: 99 % | RESPIRATION RATE: 18 BRPM | BODY MASS INDEX: 27.99 KG/M2 | TEMPERATURE: 98 F | WEIGHT: 189 LBS | HEART RATE: 96 BPM | DIASTOLIC BLOOD PRESSURE: 78 MMHG | SYSTOLIC BLOOD PRESSURE: 187 MMHG

## 2024-05-29 VITALS — DIASTOLIC BLOOD PRESSURE: 76 MMHG | SYSTOLIC BLOOD PRESSURE: 148 MMHG

## 2024-05-29 DIAGNOSIS — E11.9 TYPE 2 DIABETES MELLITUS W/OUT COMPLICATIONS: ICD-10-CM

## 2024-05-29 DIAGNOSIS — I77.1 STRICTURE OF ARTERY: ICD-10-CM

## 2024-05-29 DIAGNOSIS — I73.9 PERIPHERAL VASCULAR DISEASE, UNSPECIFIED: ICD-10-CM

## 2024-05-29 DIAGNOSIS — M86.9 OSTEOMYELITIS, UNSPECIFIED: ICD-10-CM

## 2024-05-29 DIAGNOSIS — Z94.0 OTHER LONG TERM (CURRENT) DRUG THERAPY: ICD-10-CM

## 2024-05-29 DIAGNOSIS — L98.499 NON-PRESSURE CHRONIC ULCER OF SKIN OF OTHER SITES WITH UNSPECIFIED SEVERITY: ICD-10-CM

## 2024-05-29 DIAGNOSIS — Z79.899 OTHER LONG TERM (CURRENT) DRUG THERAPY: ICD-10-CM

## 2024-05-29 DIAGNOSIS — L97.529 NON-PRESSURE CHRONIC ULCER OF OTHER PART OF LEFT FOOT WITH UNSPECIFIED SEVERITY: ICD-10-CM

## 2024-05-29 DIAGNOSIS — Z94.0 KIDNEY TRANSPLANT STATUS: ICD-10-CM

## 2024-05-29 PROCEDURE — 99214 OFFICE O/P EST MOD 30 MIN: CPT

## 2024-05-29 NOTE — HISTORY OF PRESENT ILLNESS
[FreeTextEntry1] : He received DDRT 7/16/23, Thymo induction. DGF, Recovered. 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 His ureter stent was removed on 9/5/23. Labs done today.   He has left foot dorsum of big toe wound, Was on Hyperbaric chamber treatment for over 4 weeks. Has Podiatry follow up. Now uses a regional oxygen therapy using home equipment. He had podiatry visit with Dr. Myers. Has vascular follow up with dr. Malone. Has a slowly healing ulcer medial aspect of left foot. He had left UE AVF closed  on 9/8 at [facility]. He follows with Dr. Jose Braga for heart disease care. He has AICD. He had labs today.  Home glucose controlled.     No SOB. Home blood pressure is controlled.   No fever. No hematuria   Medications	 aspirin 81 mg oral delayed release tablet: 1 tab(s) orally once a day atorvastatin 40 mg oral tablet: 1 tab(s) orally once a day (at bedtime) carvedilol 3.125 mg oral tablet: 1 tab(s) orally every 12 hours HumaLOG KwikPen 100 units/mL injectable solution: 8-10 unit(s) injectable 3 times a day (before meals) insulin glargine 100 units/mL subcutaneous solution: 21 unit(s) subcutaneous once a day (at bedtime) mycophenolate mofetil   500 milligram(s) orally 2 times a day Pepcid 20 mg oral tablet: 1 tab(s) orally once a day predniSONE  5 mg  1 tab(s) orally once a day-  senna leaf extract oral tablet: 2 tab(s) orally once a day (at bedtime) sulfamethoxazole-trimethoprim 400 mg-80 mg oral tablet: 1 tab(s) orally once a day- will restart, wbc count has recovered Envarsus: 2 mg once a day    Furosemide- not taking No longer on Eliquis.       Medication list reviewed with patient. He reports he is off Eliquis, has been on f/u with Dr. Braga, cardiologist.  He is on a new antihypertensive in addition, unsure name. Started by Dr. Braga

## 2024-05-29 NOTE — ASSESSMENT
[FreeTextEntry1] : Renal Transplant recipient: Noted allograft function, creatinine at discharge, martin creatinine. Reviewed for urinary symptoms/fever/chills/pain/new symptoms. Tolerating medications. Lab data from last visit reviewed including allograft function, urinalysis, any viremia and trough level of medication as well as any imaging reports. Immunosuppression: reviewed; Envarsus 2, cellcept 500 mg/dose pred  Noted induction regimen, maintenance regimen and reviewed target trough level. Foot ulcer: Left foot under dressing on podiatry f/u, on hyperbaric treatment DM: Current regimen reviewed. Optimal target glucose levels reviewed for fasting and post prandial measurements. Will continue to monitor and adjust treatment; reviewed lifestyle modifications for glycemia control. Foot lesion: Advised to continue Podiatry/ vascular/ wound care follow up. Hyperlipidemia: Reviewed medication regimen/lifestyle modification for maintaining target lipid levels. Cardiovascular risk reduction, primary/secondary prevention measures were discussed as appropriate.   Prophylaxis: Reviewed antimicrobial and GI prophylaxis as well as precautions to prevent infections. Advised to bring flow sheets and medication list at every visit. Discussed ophthalmology and dermatology checks at least yearly and vaccinations. Flu vaccine yearly and Pneumonia vaccine every 5 years. Had flu vaccine. Copy of office visit and lab reports are being sent to primary physician and referring nephrologist.

## 2024-05-30 LAB
ALBUMIN SERPL ELPH-MCNC: 4.2 G/DL
ALP BLD-CCNC: 93 U/L
ALT SERPL-CCNC: 12 U/L
ANION GAP SERPL CALC-SCNC: 12 MMOL/L
AST SERPL-CCNC: 16 U/L
BILIRUB SERPL-MCNC: 0.5 MG/DL
BUN SERPL-MCNC: 17 MG/DL
CALCIUM SERPL-MCNC: 10.7 MG/DL
CHLORIDE SERPL-SCNC: 105 MMOL/L
CMV DNA SPEC QL NAA+PROBE: NOT DETECTED IU/ML
CMVPCR LOG: NOT DETECTED LOG10IU/ML
CO2 SERPL-SCNC: 24 MMOL/L
CREAT SERPL-MCNC: 1.16 MG/DL
EGFR: 76 ML/MIN/1.73M2
GLUCOSE SERPL-MCNC: 119 MG/DL
HCT VFR BLD CALC: 39.5 %
HGB BLD-MCNC: 12.7 G/DL
LDH SERPL-CCNC: 154 U/L
MAGNESIUM SERPL-MCNC: 1.8 MG/DL
MCHC RBC-ENTMCNC: 28 PG
MCHC RBC-ENTMCNC: 32.2 GM/DL
MCV RBC AUTO: 87.2 FL
PHOSPHATE SERPL-MCNC: 2.7 MG/DL
PLATELET # BLD AUTO: 156 K/UL
POTASSIUM SERPL-SCNC: 4.8 MMOL/L
PROT SERPL-MCNC: 6.2 G/DL
RBC # BLD: 4.53 M/UL
RBC # FLD: 13.7 %
SODIUM SERPL-SCNC: 141 MMOL/L
TACROLIMUS SERPL-MCNC: 6.8 NG/ML
URATE SERPL-MCNC: 6.8 MG/DL
WBC # FLD AUTO: 3.23 K/UL

## 2024-06-04 LAB — BKV DNA SPEC QL NAA+PROBE: NOT DETECTED IU/ML

## 2024-06-07 ENCOUNTER — APPOINTMENT (OUTPATIENT)
Dept: WOUND CARE | Facility: HOSPITAL | Age: 54
End: 2024-06-07

## 2024-06-10 ENCOUNTER — APPOINTMENT (OUTPATIENT)
Dept: NEPHROLOGY | Facility: CLINIC | Age: 54
End: 2024-06-10

## 2024-06-24 RX ORDER — MYCOPHENOLATE MOFETIL 500 MG/1
500 TABLET ORAL
Qty: 60 | Refills: 11 | Status: ACTIVE | COMMUNITY
Start: 2023-10-09 | End: 1900-01-01

## 2024-06-25 ENCOUNTER — APPOINTMENT (OUTPATIENT)
Dept: VASCULAR SURGERY | Facility: CLINIC | Age: 54
End: 2024-06-25
Payer: MEDICARE

## 2024-06-25 VITALS
SYSTOLIC BLOOD PRESSURE: 143 MMHG | BODY MASS INDEX: 27.99 KG/M2 | WEIGHT: 189 LBS | TEMPERATURE: 97.7 F | HEIGHT: 69 IN | DIASTOLIC BLOOD PRESSURE: 82 MMHG | HEART RATE: 90 BPM

## 2024-06-25 DIAGNOSIS — I77.1 STRICTURE OF ARTERY: ICD-10-CM

## 2024-06-25 DIAGNOSIS — L98.499 STRICTURE OF ARTERY: ICD-10-CM

## 2024-06-25 PROCEDURE — 99213 OFFICE O/P EST LOW 20 MIN: CPT

## 2024-07-17 ENCOUNTER — RX RENEWAL (OUTPATIENT)
Age: 54
End: 2024-07-17

## 2024-08-01 ENCOUNTER — APPOINTMENT (OUTPATIENT)
Dept: NEPHROLOGY | Facility: CLINIC | Age: 54
End: 2024-08-01
Payer: MEDICARE

## 2024-08-01 VITALS
SYSTOLIC BLOOD PRESSURE: 129 MMHG | WEIGHT: 187 LBS | OXYGEN SATURATION: 97 % | BODY MASS INDEX: 27.7 KG/M2 | HEIGHT: 69 IN | TEMPERATURE: 98.1 F | HEART RATE: 102 BPM | DIASTOLIC BLOOD PRESSURE: 64 MMHG | RESPIRATION RATE: 18 BRPM

## 2024-08-01 DIAGNOSIS — Z94.0 OTHER LONG TERM (CURRENT) DRUG THERAPY: ICD-10-CM

## 2024-08-01 DIAGNOSIS — E11.9 TYPE 2 DIABETES MELLITUS W/OUT COMPLICATIONS: ICD-10-CM

## 2024-08-01 DIAGNOSIS — Z94.0 KIDNEY TRANSPLANT STATUS: ICD-10-CM

## 2024-08-01 DIAGNOSIS — E78.5 HYPERLIPIDEMIA, UNSPECIFIED: ICD-10-CM

## 2024-08-01 DIAGNOSIS — Z79.899 OTHER LONG TERM (CURRENT) DRUG THERAPY: ICD-10-CM

## 2024-08-01 LAB
ALBUMIN SERPL ELPH-MCNC: 4.1 G/DL
ALP BLD-CCNC: 75 U/L
ALT SERPL-CCNC: 16 U/L
ANION GAP SERPL CALC-SCNC: 12 MMOL/L
AST SERPL-CCNC: 17 U/L
BILIRUB SERPL-MCNC: 0.6 MG/DL
BUN SERPL-MCNC: 20 MG/DL
CALCIUM SERPL-MCNC: 10.1 MG/DL
CHLORIDE SERPL-SCNC: 105 MMOL/L
CO2 SERPL-SCNC: 23 MMOL/L
CREAT SERPL-MCNC: 0.99 MG/DL
EGFR: 91 ML/MIN/1.73M2
GLUCOSE SERPL-MCNC: 159 MG/DL
HCT VFR BLD CALC: 36.5 %
HGB BLD-MCNC: 11.5 G/DL
LDH SERPL-CCNC: 172 U/L
MAGNESIUM SERPL-MCNC: 1.8 MG/DL
MCHC RBC-ENTMCNC: 28 PG
MCHC RBC-ENTMCNC: 31.5 GM/DL
MCV RBC AUTO: 89 FL
PHOSPHATE SERPL-MCNC: 2.4 MG/DL
PLATELET # BLD AUTO: 156 K/UL
POTASSIUM SERPL-SCNC: 4.5 MMOL/L
PROT SERPL-MCNC: 5.8 G/DL
RBC # BLD: 4.1 M/UL
RBC # FLD: 15.3 %
SODIUM SERPL-SCNC: 140 MMOL/L
TACROLIMUS SERPL-MCNC: 5.7 NG/ML
URATE SERPL-MCNC: 5.5 MG/DL
WBC # FLD AUTO: 3.84 K/UL

## 2024-08-01 PROCEDURE — 99214 OFFICE O/P EST MOD 30 MIN: CPT

## 2024-08-01 NOTE — HISTORY OF PRESENT ILLNESS
[FreeTextEntry1] : He received DDRT 7/16/23, Thymo induction. DGF, Recovered. 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 His ureter stent was removed on 9/5/23. Labs done today.   He has left foot  wound, b . Has Podiatry follow up.  Healing gradually He had podiatry visit with Dr. Myers. Has vascular follow up with dr. Malone. Has a slowly healing ulcer medial aspect of left foot. He had left UE AVF closed  on 9/8 at [facility]. He follows with Dr. Jose Braga for heart disease care. He has AICD. He had labs today.  Home glucose controlled.     No SOB. Home blood pressure is controlled.   No fever. No hematuria   Medications	 aspirin 81 mg oral delayed release tablet: 1 tab(s) orally once a day atorvastatin 40 mg oral tablet: 1 tab(s) orally once a day (at bedtime) carvedilol 3.125 mg oral tablet: 1 tab(s) orally every 12 hours HumaLOG KwikPen 100 units/mL injectable solution: 8-10 unit(s) injectable 3 times a day (before meals) insulin glargine 100 units/mL subcutaneous solution: 21 unit(s) subcutaneous once a day (at bedtime) mycophenolate mofetil   500 milligram(s) orally 2 times a day Pepcid 20 mg oral tablet: 1 tab(s) orally once a day predniSONE  5 mg  1 tab(s) orally once a day-  senna leaf extract oral tablet: 2 tab(s) orally once a day (at bedtime) sulfamethoxazole-trimethoprim 400 mg-80 mg oral tablet: 1 tab(s) orally once a day- will restart, wbc count has recovered Envarsus: 2 mg once a day  Hydralazine 25 BID (Started by Dr. Braga)   Furosemide- not taking No longer on Eliquis.       Medication list reviewed with patient. He reports he is off Eliquis, has been on f/u with Dr. Braga, cardiologist.  He is on a new antihypertensive in addition, unsure name. Started by Dr. Braga  Plans to go to [facility] in September

## 2024-08-01 NOTE — PHYSICAL EXAM
[General Appearance - Alert] : alert [General Appearance - In No Acute Distress] : in no acute distress [Sclera] : the sclera and conjunctiva were normal [Outer Ear] : the ears and nose were normal in appearance [Jugular Venous Distention Increased] : there was no jugular-venous distention [Heart Sounds Gallop] : no gallops [Heart Sounds Pericardial Friction Rub] : no pericardial rub [Cervical Lymph Nodes Enlarged Posterior Bilaterally] : posterior cervical [Cervical Lymph Nodes Enlarged Anterior Bilaterally] : anterior cervical [Involuntary Movements] : no involuntary movements were seen [] : no rash [Oriented To Time, Place, And Person] : oriented to person, place, and time [Impaired Insight] : insight and judgment were intact [FreeTextEntry1] : A, OX3

## 2024-08-01 NOTE — ASSESSMENT
[FreeTextEntry1] : Renal Transplant recipient: Noted allograft function, creatinine at discharge, martin creatinine. Reviewed for urinary symptoms/fever/chills/pain/new symptoms. Tolerating medications. Lab data from last visit reviewed including allograft function, urinalysis, any viremia and trough level of medication as well as any imaging reports. Immunosuppression: reviewed; Envarsus 2, cellcept 500 mg/dose pred 10/d  Noted induction regimen, maintenance regimen and reviewed target trough level. Will decrease prednisone to 7.5 mg/d and then to 5/d in 2 months. Foot ulcer: Left foot under dressing on podiatry f/u, healing ulcer DM: Current regimen reviewed. Optimal target glucose levels reviewed for fasting and post prandial measurements. Will continue to monitor and adjust treatment; reviewed lifestyle modifications for glycemia control. Foot lesion: Advised to continue Podiatry/ vascular/ wound care follow up. Hyperlipidemia: Reviewed medication regimen/lifestyle modification for maintaining target lipid levels. Cardiovascular risk reduction, primary/secondary prevention measures were discussed as appropriate.   Prophylaxis: Reviewed antimicrobial and GI prophylaxis as well as precautions to prevent infections. Advised to bring flow sheets and medication list at every visit. Discussed ophthalmology and dermatology checks at least yearly and vaccinations. Flu vaccine yearly and Pneumonia vaccine every 5 years. Copy of office visit and lab reports are being sent to primary physician and referring nephrologist.

## 2024-08-02 LAB
BKV DNA SPEC QL NAA+PROBE: NOT DETECTED IU/ML
CMV DNA SPEC QL NAA+PROBE: NOT DETECTED IU/ML
CMVPCR LOG: NOT DETECTED LOG10IU/ML

## 2024-09-06 NOTE — PROCEDURE NOTE - NSTIMEOUT_GEN_A_CORE
Routing to scheduling to coordinate the following:  Fasting labs  CT venogram abdomen and pelvis with contrast  Bilateral lower extremity venous competency   Echocardiogram    Follow up in clinic 2 weeks after tests are completed     Appt note:  follow up to 9/5/24 appt. Fasting labs  CT venogram abdomen and pelvis with contrast  Bilateral lower extremity venous competency   Echocardiogram    Quinton Patel RN on 9/6/2024 at 9:22 AM      Patient's first and last name, , procedure, and correct site confirmed prior to the start of procedure.

## 2024-09-16 ENCOUNTER — APPOINTMENT (OUTPATIENT)
Dept: NEPHROLOGY | Facility: CLINIC | Age: 54
End: 2024-09-16
Payer: MEDICARE

## 2024-09-16 VITALS
RESPIRATION RATE: 18 BRPM | OXYGEN SATURATION: 98 % | BODY MASS INDEX: 26.36 KG/M2 | HEIGHT: 69 IN | TEMPERATURE: 97.2 F | WEIGHT: 178 LBS | SYSTOLIC BLOOD PRESSURE: 178 MMHG | HEART RATE: 90 BPM | DIASTOLIC BLOOD PRESSURE: 78 MMHG

## 2024-09-16 DIAGNOSIS — Z79.899 OTHER LONG TERM (CURRENT) DRUG THERAPY: ICD-10-CM

## 2024-09-16 DIAGNOSIS — I10 ESSENTIAL (PRIMARY) HYPERTENSION: ICD-10-CM

## 2024-09-16 DIAGNOSIS — Z94.0 KIDNEY TRANSPLANT STATUS: ICD-10-CM

## 2024-09-16 DIAGNOSIS — E11.9 TYPE 2 DIABETES MELLITUS W/OUT COMPLICATIONS: ICD-10-CM

## 2024-09-16 DIAGNOSIS — Z94.0 OTHER LONG TERM (CURRENT) DRUG THERAPY: ICD-10-CM

## 2024-09-16 LAB
ALBUMIN SERPL ELPH-MCNC: 4.2 G/DL
ALP BLD-CCNC: 75 U/L
ALT SERPL-CCNC: 18 U/L
ANION GAP SERPL CALC-SCNC: 10 MMOL/L
APPEARANCE: CLEAR
AST SERPL-CCNC: 21 U/L
BACTERIA: NEGATIVE /HPF
BILIRUB SERPL-MCNC: 0.4 MG/DL
BILIRUBIN URINE: NEGATIVE
BLOOD URINE: NEGATIVE
BUN SERPL-MCNC: 23 MG/DL
CALCIUM OXALATE CRYSTALS: PRESENT
CALCIUM SERPL-MCNC: 10.4 MG/DL
CAST: 0 /LPF
CHLORIDE SERPL-SCNC: 108 MMOL/L
CO2 SERPL-SCNC: 24 MMOL/L
COLOR: YELLOW
CREAT SERPL-MCNC: 1.1 MG/DL
CREAT SPEC-SCNC: 132 MG/DL
CREAT/PROT UR: 0.1 RATIO
EGFR: 80 ML/MIN/1.73M2
EPITHELIAL CELLS: 0 /HPF
GLUCOSE QUALITATIVE U: NEGATIVE MG/DL
GLUCOSE SERPL-MCNC: 43 MG/DL
HCT VFR BLD CALC: 40.5 %
HGB BLD-MCNC: 12.6 G/DL
KETONES URINE: NEGATIVE MG/DL
LDH SERPL-CCNC: 169 U/L
LEUKOCYTE ESTERASE URINE: NEGATIVE
MAGNESIUM SERPL-MCNC: 1.9 MG/DL
MCHC RBC-ENTMCNC: 29 PG
MCHC RBC-ENTMCNC: 31.1 GM/DL
MCV RBC AUTO: 93.3 FL
MICROSCOPIC-UA: NORMAL
NITRITE URINE: NEGATIVE
PH URINE: 5.5
PHOSPHATE SERPL-MCNC: 2.5 MG/DL
PLATELET # BLD AUTO: 154 K/UL
POTASSIUM SERPL-SCNC: 4.1 MMOL/L
PROT SERPL-MCNC: 6.2 G/DL
PROT UR-MCNC: 15 MG/DL
PROTEIN URINE: NORMAL MG/DL
RBC # BLD: 4.34 M/UL
RBC # FLD: 13.7 %
RED BLOOD CELLS URINE: 1 /HPF
REVIEW: NORMAL
SODIUM SERPL-SCNC: 143 MMOL/L
SPECIFIC GRAVITY URINE: 1.02
TACROLIMUS SERPL-MCNC: 6.5 NG/ML
URATE SERPL-MCNC: 6.2 MG/DL
UROBILINOGEN URINE: 0.2 MG/DL
WBC # FLD AUTO: 5.04 K/UL
WHITE BLOOD CELLS URINE: 4 /HPF

## 2024-09-16 PROCEDURE — 99214 OFFICE O/P EST MOD 30 MIN: CPT

## 2024-09-16 NOTE — HISTORY OF PRESENT ILLNESS
[FreeTextEntry1] : He received DDRT 7/16/23, Thymo induction. DGF, Recovered. 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 His ureter stent was removed on 9/5/23. Labs done today.   He has left foot  wound, b . Has Podiatry follow up.  Healing gradually He had podiatry visit with Dr. Myers. Has vascular follow up with dr. Malone. Has a slowly healing ulcer medial aspect of left foot. He had left UE AVF closed  on 9/8 at [facility]. He follows with Dr. Jose Braga for heart disease care. He has AICD. He had labs today.  Home glucose controlled.     No SOB.Working in [employer] 4 days a week. Blood pressure is elevated. Has been on f/u with Dr. Braga, now on Hydralazine. Home blood pressure is usually 130/60   No fever. No hematuria   Medications	 aspirin 81 mg oral delayed release tablet: 1 tab(s) orally once a day atorvastatin 40 mg oral tablet: 1 tab(s) orally once a day (at bedtime) carvedilol 3.125 mg oral tablet: 1 tab(s) orally every 12 hours HumaLOG KwikPen 100 units/mL injectable solution: 8-10 unit(s) injectable 3 times a day (before meals) insulin glargine 100 units/mL subcutaneous solution: 21 unit(s) subcutaneous once a day (at bedtime) mycophenolate mofetil   500 milligram(s) orally 2 times a day Pepcid 20 mg oral tablet: 1 tab(s) orally once a day predniSONE  5 mg  1 tab(s) orally once a day-  senna leaf extract oral tablet: 2 tab(s) orally once a day (at bedtime) sulfamethoxazole-trimethoprim 400 mg-80 mg oral tablet: 1 tab(s) orally once a day- will restart, wbc count has recovered Envarsus: 2 mg once a day  Hydralazine 25 BID (Started by Dr. Braga)   Furosemide- not taking No longer on Eliquis.       Medication list reviewed with patient. He reports he is off Eliquis, has been on f/u with Dr. Braga, cardiologist.  He is on a new antihypertensive in addition, unsure name. Started by Dr. Braga

## 2024-09-16 NOTE — ASSESSMENT
[FreeTextEntry1] : Renal Transplant recipient: Noted allograft function, creatinine at discharge, martin creatinine. Reviewed for urinary symptoms/fever/chills/pain/new symptoms. Tolerating medications. Lab data from last visit reviewed including allograft function, urinalysis, any viremia and trough level of medication as well as any imaging reports. Immunosuppression: reviewed; Envarsus 2, cellcept 500 mg/dose pred 10/d  Noted induction regimen, maintenance regimen and reviewed target trough level.   Foot ulcer: Left foot   healing ulcer DM: Current regimen reviewed. Optimal target glucose levels reviewed for fasting and post prandial measurements. Will continue to monitor and adjust treatment; reviewed lifestyle modifications for glycemia control. Foot lesion: Advised to continue Podiatry/ vascular/ wound care follow up. Hyperlipidemia: Reviewed medication regimen/lifestyle modification for maintaining target lipid levels. Cardiovascular risk reduction, primary/secondary prevention measures were discussed as appropriate.   Prophylaxis: Reviewed antimicrobial and GI prophylaxis as well as precautions to prevent infections. Advised to bring flow sheets and medication list at every visit. Discussed ophthalmology and dermatology checks at least yearly and vaccinations. Flu vaccine yearly and Pneumonia vaccine every 5 years. Copy of office visit and lab reports are being sent to primary physician and referring nephrologist. F/u   every 3 months. Also sees Dr. Romo

## 2024-09-18 LAB
CMV DNA SPEC QL NAA+PROBE: NOT DETECTED IU/ML
CMVPCR LOG: NOT DETECTED LOG10IU/ML

## 2024-09-19 LAB — BKV DNA SPEC QL NAA+PROBE: NOT DETECTED IU/ML

## 2024-10-07 ENCOUNTER — APPOINTMENT (OUTPATIENT)
Age: 54
End: 2024-10-07
Payer: SELF-PAY

## 2024-10-07 PROCEDURE — SCREENING: CUSTOM

## 2024-10-07 PROCEDURE — D0140: CPT

## 2024-10-07 PROCEDURE — D0330 PANORAMIC RADIOGRAPHIC IMAGE: CPT

## 2024-10-10 ENCOUNTER — APPOINTMENT (OUTPATIENT)
Dept: NEPHROLOGY | Facility: CLINIC | Age: 54
End: 2024-10-10

## 2024-10-14 ENCOUNTER — APPOINTMENT (OUTPATIENT)
Age: 54
End: 2024-10-14
Payer: SELF-PAY

## 2024-10-14 PROCEDURE — D0150: CPT

## 2024-10-14 PROCEDURE — D0210: CPT | Mod: NC

## 2024-10-22 ENCOUNTER — APPOINTMENT (OUTPATIENT)
Dept: VASCULAR SURGERY | Facility: CLINIC | Age: 54
End: 2024-10-22
Payer: MEDICARE

## 2024-10-22 VITALS
DIASTOLIC BLOOD PRESSURE: 73 MMHG | BODY MASS INDEX: 26.36 KG/M2 | WEIGHT: 178 LBS | HEART RATE: 69 BPM | HEIGHT: 69 IN | SYSTOLIC BLOOD PRESSURE: 145 MMHG | TEMPERATURE: 97.4 F

## 2024-10-22 DIAGNOSIS — I77.1 STRICTURE OF ARTERY: ICD-10-CM

## 2024-10-22 DIAGNOSIS — L98.499 STRICTURE OF ARTERY: ICD-10-CM

## 2024-10-22 PROCEDURE — 99213 OFFICE O/P EST LOW 20 MIN: CPT

## 2024-11-04 ENCOUNTER — APPOINTMENT (OUTPATIENT)
Age: 54
End: 2024-11-04
Payer: MEDICAID

## 2024-11-04 PROCEDURE — D7140: CPT

## 2024-11-04 PROCEDURE — D7953: CPT

## 2024-11-18 ENCOUNTER — APPOINTMENT (OUTPATIENT)
Age: 54
End: 2024-11-18
Payer: MEDICAID

## 2024-11-18 DIAGNOSIS — M86.60 OTHER CHRONIC OSTEOMYELITIS, UNSPECIFIED SITE: ICD-10-CM

## 2024-11-18 DIAGNOSIS — L97.524 NON-PRESSURE CHRONIC ULCER OF OTHER PART OF LEFT FOOT WITH NECROSIS OF BONE: ICD-10-CM

## 2024-11-18 DIAGNOSIS — L97.529 NON-PRESSURE CHRONIC ULCER OF OTHER PART OF LEFT FOOT WITH UNSPECIFIED SEVERITY: ICD-10-CM

## 2024-11-18 DIAGNOSIS — E11.9 TYPE 2 DIABETES MELLITUS WITHOUT COMPLICATIONS: ICD-10-CM

## 2024-11-18 DIAGNOSIS — I77.1 STRICTURE OF ARTERY: ICD-10-CM

## 2024-11-18 PROCEDURE — ZZZZZ: CPT

## 2024-12-02 ENCOUNTER — APPOINTMENT (OUTPATIENT)
Dept: NEPHROLOGY | Facility: CLINIC | Age: 54
End: 2024-12-02
Payer: MEDICARE

## 2024-12-02 VITALS
TEMPERATURE: 97.2 F | HEART RATE: 77 BPM | WEIGHT: 179 LBS | RESPIRATION RATE: 18 BRPM | OXYGEN SATURATION: 97 % | DIASTOLIC BLOOD PRESSURE: 72 MMHG | HEIGHT: 69 IN | SYSTOLIC BLOOD PRESSURE: 147 MMHG | BODY MASS INDEX: 26.51 KG/M2

## 2024-12-02 DIAGNOSIS — E11.9 TYPE 2 DIABETES MELLITUS W/OUT COMPLICATIONS: ICD-10-CM

## 2024-12-02 DIAGNOSIS — Z94.0 OTHER LONG TERM (CURRENT) DRUG THERAPY: ICD-10-CM

## 2024-12-02 DIAGNOSIS — Z79.899 OTHER LONG TERM (CURRENT) DRUG THERAPY: ICD-10-CM

## 2024-12-02 DIAGNOSIS — I10 ESSENTIAL (PRIMARY) HYPERTENSION: ICD-10-CM

## 2024-12-02 DIAGNOSIS — Z94.0 KIDNEY TRANSPLANT STATUS: ICD-10-CM

## 2024-12-02 LAB
ALBUMIN SERPL ELPH-MCNC: 4.1 G/DL
ALP BLD-CCNC: 74 U/L
ALT SERPL-CCNC: 17 U/L
ANION GAP SERPL CALC-SCNC: 10 MMOL/L
AST SERPL-CCNC: 14 U/L
BILIRUB SERPL-MCNC: 0.4 MG/DL
BUN SERPL-MCNC: 27 MG/DL
CALCIUM SERPL-MCNC: 10.2 MG/DL
CHLORIDE SERPL-SCNC: 105 MMOL/L
CO2 SERPL-SCNC: 26 MMOL/L
CREAT SERPL-MCNC: 1.13 MG/DL
EGFR: 78 ML/MIN/1.73M2
GLUCOSE SERPL-MCNC: 64 MG/DL
HCT VFR BLD CALC: 40.6 %
HGB BLD-MCNC: 12.7 G/DL
LDH SERPL-CCNC: 155 U/L
MAGNESIUM SERPL-MCNC: 2 MG/DL
MCHC RBC-ENTMCNC: 28.7 PG
MCHC RBC-ENTMCNC: 31.3 G/DL
MCV RBC AUTO: 91.6 FL
PHOSPHATE SERPL-MCNC: 3.2 MG/DL
PLATELET # BLD AUTO: 194 K/UL
POTASSIUM SERPL-SCNC: 4.4 MMOL/L
PROT SERPL-MCNC: 6 G/DL
RBC # BLD: 4.43 M/UL
RBC # FLD: 13.7 %
SODIUM SERPL-SCNC: 141 MMOL/L
TACROLIMUS SERPL-MCNC: 3.7 NG/ML
URATE SERPL-MCNC: 5.8 MG/DL
WBC # FLD AUTO: 2.51 K/UL

## 2024-12-02 PROCEDURE — 99214 OFFICE O/P EST MOD 30 MIN: CPT

## 2024-12-03 LAB
CMV DNA SPEC QL NAA+PROBE: NOT DETECTED IU/ML
CMVPCR LOG: NOT DETECTED LOG10IU/ML

## 2024-12-04 LAB — BKV DNA SPEC QL NAA+PROBE: NOT DETECTED IU/ML

## 2024-12-17 ENCOUNTER — APPOINTMENT (OUTPATIENT)
Dept: VASCULAR SURGERY | Facility: CLINIC | Age: 54
End: 2024-12-17

## 2024-12-23 ENCOUNTER — APPOINTMENT (OUTPATIENT)
Age: 54
End: 2024-12-23
Payer: SELF-PAY

## 2024-12-23 PROCEDURE — PIP: CUSTOM

## 2025-01-08 ENCOUNTER — APPOINTMENT (OUTPATIENT)
Age: 55
End: 2025-01-08
Payer: SELF-PAY

## 2025-01-08 PROCEDURE — D5130 IMMEDIATE DENTURE - MAXILLARY: CPT

## 2025-01-08 PROCEDURE — D5140 IMMEDIATE DENTURE - MANDIBULAR: CPT

## 2025-01-29 ENCOUNTER — APPOINTMENT (OUTPATIENT)
Age: 55
End: 2025-01-29
Payer: SELF-PAY

## 2025-01-29 PROCEDURE — D7953: CPT

## 2025-01-29 PROCEDURE — D7140: CPT

## 2025-01-31 ENCOUNTER — APPOINTMENT (OUTPATIENT)
Age: 55
End: 2025-01-31
Payer: SELF-PAY

## 2025-01-31 PROCEDURE — D0171: CPT | Mod: NC

## 2025-02-05 ENCOUNTER — APPOINTMENT (OUTPATIENT)
Age: 55
End: 2025-02-05
Payer: SELF-PAY

## 2025-02-05 PROCEDURE — D7953: CPT

## 2025-02-05 PROCEDURE — D7140: CPT

## 2025-02-07 ENCOUNTER — APPOINTMENT (OUTPATIENT)
Age: 55
End: 2025-02-07
Payer: SELF-PAY

## 2025-02-07 PROCEDURE — D0171: CPT | Mod: NC

## 2025-02-10 ENCOUNTER — APPOINTMENT (OUTPATIENT)
Dept: CV DIAGNOSTICS | Facility: HOSPITAL | Age: 55
End: 2025-02-10

## 2025-02-10 ENCOUNTER — RESULT REVIEW (OUTPATIENT)
Age: 55
End: 2025-02-10

## 2025-02-10 ENCOUNTER — OUTPATIENT (OUTPATIENT)
Dept: OUTPATIENT SERVICES | Facility: HOSPITAL | Age: 55
LOS: 1 days | End: 2025-02-10
Payer: MEDICARE

## 2025-02-10 DIAGNOSIS — Z95.1 PRESENCE OF AORTOCORONARY BYPASS GRAFT: Chronic | ICD-10-CM

## 2025-02-10 DIAGNOSIS — Z89.422 ACQUIRED ABSENCE OF OTHER LEFT TOE(S): Chronic | ICD-10-CM

## 2025-02-10 DIAGNOSIS — R07.9 CHEST PAIN, UNSPECIFIED: ICD-10-CM

## 2025-02-10 DIAGNOSIS — Z95.810 PRESENCE OF AUTOMATIC (IMPLANTABLE) CARDIAC DEFIBRILLATOR: Chronic | ICD-10-CM

## 2025-02-10 PROCEDURE — 93018 CV STRESS TEST I&R ONLY: CPT | Mod: GC

## 2025-02-10 PROCEDURE — 93016 CV STRESS TEST SUPVJ ONLY: CPT | Mod: GC

## 2025-02-10 PROCEDURE — 78452 HT MUSCLE IMAGE SPECT MULT: CPT | Mod: 26

## 2025-02-12 ENCOUNTER — APPOINTMENT (OUTPATIENT)
Age: 55
End: 2025-02-12

## 2025-02-20 ENCOUNTER — APPOINTMENT (OUTPATIENT)
Age: 55
End: 2025-02-20
Payer: SELF-PAY

## 2025-02-20 PROCEDURE — D0171: CPT

## 2025-03-03 ENCOUNTER — APPOINTMENT (OUTPATIENT)
Age: 55
End: 2025-03-03
Payer: SELF-PAY

## 2025-03-03 ENCOUNTER — APPOINTMENT (OUTPATIENT)
Dept: NEPHROLOGY | Facility: CLINIC | Age: 55
End: 2025-03-03
Payer: MEDICARE

## 2025-03-03 VITALS
HEART RATE: 87 BPM | DIASTOLIC BLOOD PRESSURE: 66 MMHG | WEIGHT: 172 LBS | RESPIRATION RATE: 18 BRPM | OXYGEN SATURATION: 97 % | TEMPERATURE: 98 F | BODY MASS INDEX: 25.48 KG/M2 | HEIGHT: 69 IN | SYSTOLIC BLOOD PRESSURE: 146 MMHG

## 2025-03-03 VITALS — SYSTOLIC BLOOD PRESSURE: 126 MMHG | DIASTOLIC BLOOD PRESSURE: 64 MMHG

## 2025-03-03 DIAGNOSIS — Z94.0 OTHER LONG TERM (CURRENT) DRUG THERAPY: ICD-10-CM

## 2025-03-03 DIAGNOSIS — Z79.899 OTHER LONG TERM (CURRENT) DRUG THERAPY: ICD-10-CM

## 2025-03-03 DIAGNOSIS — Z94.0 KIDNEY TRANSPLANT STATUS: ICD-10-CM

## 2025-03-03 DIAGNOSIS — Z95.1 PRESENCE OF AORTOCORONARY BYPASS GRAFT: ICD-10-CM

## 2025-03-03 DIAGNOSIS — E11.9 TYPE 2 DIABETES MELLITUS W/OUT COMPLICATIONS: ICD-10-CM

## 2025-03-03 LAB
25(OH)D3 SERPL-MCNC: 17.8 NG/ML
ALBUMIN SERPL ELPH-MCNC: 3.8 G/DL
ALP BLD-CCNC: 86 U/L
ALT SERPL-CCNC: 12 U/L
ANION GAP SERPL CALC-SCNC: 12 MMOL/L
AST SERPL-CCNC: 15 U/L
BILIRUB SERPL-MCNC: 0.4 MG/DL
BUN SERPL-MCNC: 22 MG/DL
CALCIUM SERPL-MCNC: 10.1 MG/DL
CALCIUM SERPL-MCNC: 10.1 MG/DL
CHLORIDE SERPL-SCNC: 106 MMOL/L
CHOLEST SERPL-MCNC: 122 MG/DL
CO2 SERPL-SCNC: 23 MMOL/L
CREAT SERPL-MCNC: 1.07 MG/DL
EGFR: 83 ML/MIN/1.73M2
GLUCOSE SERPL-MCNC: 97 MG/DL
HCT VFR BLD CALC: 36.5 %
HDLC SERPL-MCNC: 64 MG/DL
HGB BLD-MCNC: 11.6 G/DL
LDH SERPL-CCNC: 134 U/L
LDLC SERPL CALC-MCNC: 46 MG/DL
MAGNESIUM SERPL-MCNC: 1.9 MG/DL
MCHC RBC-ENTMCNC: 27.9 PG
MCHC RBC-ENTMCNC: 31.8 G/DL
MCV RBC AUTO: 87.7 FL
NONHDLC SERPL-MCNC: 58 MG/DL
PARATHYROID HORMONE INTACT: 144 PG/ML
PHOSPHATE SERPL-MCNC: 2.8 MG/DL
PLATELET # BLD AUTO: 166 K/UL
POTASSIUM SERPL-SCNC: 4.4 MMOL/L
PROT SERPL-MCNC: 5.7 G/DL
RBC # BLD: 4.16 M/UL
RBC # FLD: 13.7 %
SODIUM SERPL-SCNC: 141 MMOL/L
TACROLIMUS SERPL-MCNC: 6.7 NG/ML
TRIGL SERPL-MCNC: 48 MG/DL
URATE SERPL-MCNC: 6.1 MG/DL
WBC # FLD AUTO: 4.04 K/UL

## 2025-03-03 PROCEDURE — CUD6: CUSTOM

## 2025-03-03 PROCEDURE — 99214 OFFICE O/P EST MOD 30 MIN: CPT

## 2025-03-03 PROCEDURE — CLD6: CUSTOM

## 2025-03-03 RX ORDER — HYDRALAZINE HYDROCHLORIDE 50 MG/1
50 TABLET ORAL 3 TIMES DAILY
Qty: 90 | Refills: 1 | Status: ACTIVE | COMMUNITY
Start: 2025-03-03

## 2025-03-24 ENCOUNTER — APPOINTMENT (OUTPATIENT)
Age: 55
End: 2025-03-24
Payer: SELF-PAY

## 2025-03-24 PROCEDURE — PIP: CUSTOM

## 2025-04-07 ENCOUNTER — APPOINTMENT (OUTPATIENT)
Age: 55
End: 2025-04-07
Payer: SELF-PAY

## 2025-04-07 PROCEDURE — D0382: CPT

## 2025-05-19 ENCOUNTER — APPOINTMENT (OUTPATIENT)
Age: 55
End: 2025-05-19
Payer: SELF-PAY

## 2025-05-19 PROCEDURE — CUD1: CUSTOM

## 2025-05-19 PROCEDURE — CLD1: CUSTOM

## 2025-06-02 ENCOUNTER — APPOINTMENT (OUTPATIENT)
Dept: NEPHROLOGY | Facility: CLINIC | Age: 55
End: 2025-06-02
Payer: MEDICARE

## 2025-06-02 ENCOUNTER — APPOINTMENT (OUTPATIENT)
Age: 55
End: 2025-06-02
Payer: SELF-PAY

## 2025-06-02 VITALS
BODY MASS INDEX: 24.29 KG/M2 | WEIGHT: 164 LBS | OXYGEN SATURATION: 97 % | RESPIRATION RATE: 18 BRPM | HEIGHT: 69 IN | HEART RATE: 83 BPM | DIASTOLIC BLOOD PRESSURE: 68 MMHG | SYSTOLIC BLOOD PRESSURE: 154 MMHG | TEMPERATURE: 98.1 F

## 2025-06-02 VITALS — DIASTOLIC BLOOD PRESSURE: 60 MMHG | SYSTOLIC BLOOD PRESSURE: 130 MMHG

## 2025-06-02 DIAGNOSIS — Z79.899 OTHER LONG TERM (CURRENT) DRUG THERAPY: ICD-10-CM

## 2025-06-02 DIAGNOSIS — E11.9 TYPE 2 DIABETES MELLITUS W/OUT COMPLICATIONS: ICD-10-CM

## 2025-06-02 DIAGNOSIS — Z94.0 OTHER LONG TERM (CURRENT) DRUG THERAPY: ICD-10-CM

## 2025-06-02 DIAGNOSIS — I10 ESSENTIAL (PRIMARY) HYPERTENSION: ICD-10-CM

## 2025-06-02 DIAGNOSIS — Z94.0 KIDNEY TRANSPLANT STATUS: ICD-10-CM

## 2025-06-02 LAB
ALBUMIN SERPL ELPH-MCNC: 4.3 G/DL
ALP BLD-CCNC: 98 U/L
ALT SERPL-CCNC: 18 U/L
ANION GAP SERPL CALC-SCNC: 11 MMOL/L
AST SERPL-CCNC: 21 U/L
BILIRUB SERPL-MCNC: 0.5 MG/DL
BUN SERPL-MCNC: 17 MG/DL
CALCIUM SERPL-MCNC: 10.4 MG/DL
CHLORIDE SERPL-SCNC: 104 MMOL/L
CO2 SERPL-SCNC: 24 MMOL/L
CREAT SERPL-MCNC: 1 MG/DL
EGFRCR SERPLBLD CKD-EPI 2021: 90 ML/MIN/1.73M2
GLUCOSE SERPL-MCNC: 147 MG/DL
HCT VFR BLD CALC: 39.5 %
HGB BLD-MCNC: 12.4 G/DL
LDH SERPL-CCNC: 124 U/L
MAGNESIUM SERPL-MCNC: 1.7 MG/DL
MCHC RBC-ENTMCNC: 27.9 PG
MCHC RBC-ENTMCNC: 31.4 G/DL
MCV RBC AUTO: 89 FL
PHOSPHATE SERPL-MCNC: 2.6 MG/DL
PLATELET # BLD AUTO: 159 K/UL
POTASSIUM SERPL-SCNC: 4.9 MMOL/L
PROT SERPL-MCNC: 6.2 G/DL
RBC # BLD: 4.44 M/UL
RBC # FLD: 13.6 %
SODIUM SERPL-SCNC: 138 MMOL/L
URATE SERPL-MCNC: 6.3 MG/DL
WBC # FLD AUTO: 6.95 K/UL

## 2025-06-02 PROCEDURE — 99214 OFFICE O/P EST MOD 30 MIN: CPT

## 2025-06-02 PROCEDURE — D5120 COMPLETE DENTURE - MANDIBULAR: CPT

## 2025-06-02 PROCEDURE — D5110 COMPLETE DENTURE - MAXILLARY: CPT

## 2025-06-02 PROCEDURE — CUD2: CUSTOM

## 2025-06-04 LAB
CMV DNA SPEC QL NAA+PROBE: NOT DETECTED IU/ML
CMVPCR LOG: NOT DETECTED LOG10IU/ML
TACROLIMUS SERPL-MCNC: 8.6 NG/ML

## 2025-06-05 LAB — BKV DNA SPEC QL NAA+PROBE: NOT DETECTED IU/ML

## 2025-06-16 ENCOUNTER — APPOINTMENT (OUTPATIENT)
Age: 55
End: 2025-06-16
Payer: SELF-PAY

## 2025-06-16 PROCEDURE — CLD3: CUSTOM

## 2025-06-16 PROCEDURE — CUD3: CUSTOM

## 2025-06-23 ENCOUNTER — APPOINTMENT (OUTPATIENT)
Dept: VASCULAR SURGERY | Facility: CLINIC | Age: 55
End: 2025-06-23
Payer: MEDICARE

## 2025-06-23 VITALS
HEIGHT: 69 IN | HEART RATE: 74 BPM | WEIGHT: 162 LBS | TEMPERATURE: 98.2 F | BODY MASS INDEX: 23.99 KG/M2 | SYSTOLIC BLOOD PRESSURE: 134 MMHG | DIASTOLIC BLOOD PRESSURE: 70 MMHG

## 2025-06-23 PROCEDURE — 99214 OFFICE O/P EST MOD 30 MIN: CPT

## 2025-06-23 PROCEDURE — 93923 UPR/LXTR ART STDY 3+ LVLS: CPT

## 2025-06-30 ENCOUNTER — APPOINTMENT (OUTPATIENT)
Age: 55
End: 2025-06-30
Payer: SELF-PAY

## 2025-06-30 PROCEDURE — PIP: CUSTOM

## 2025-07-14 ENCOUNTER — APPOINTMENT (OUTPATIENT)
Age: 55
End: 2025-07-14
Payer: SELF-PAY

## 2025-07-14 PROCEDURE — CLD4: CUSTOM

## 2025-07-14 PROCEDURE — CUD4: CUSTOM

## 2025-08-11 ENCOUNTER — APPOINTMENT (OUTPATIENT)
Age: 55
End: 2025-08-11
Payer: SELF-PAY

## 2025-08-11 PROCEDURE — CUD5: CUSTOM

## 2025-08-11 PROCEDURE — CLD5: CUSTOM

## 2025-08-25 ENCOUNTER — APPOINTMENT (OUTPATIENT)
Age: 55
End: 2025-08-25
Payer: MEDICAID

## 2025-08-25 PROCEDURE — CLD6: CUSTOM

## 2025-08-25 PROCEDURE — CUD6: CUSTOM

## 2025-09-15 ENCOUNTER — APPOINTMENT (OUTPATIENT)
Age: 55
End: 2025-09-15
Payer: MEDICAID

## 2025-09-15 PROCEDURE — D0383: CPT

## 2025-09-22 LAB
25(OH)D3 SERPL-MCNC: 15.1 NG/ML
ALBUMIN SERPL ELPH-MCNC: 4.3 G/DL
ALP BLD-CCNC: 90 U/L
ALT SERPL-CCNC: 26 U/L
ANION GAP SERPL CALC-SCNC: 11 MMOL/L
AST SERPL-CCNC: 20 U/L
BILIRUB SERPL-MCNC: 0.4 MG/DL
BUN SERPL-MCNC: 26 MG/DL
CALCIUM SERPL-MCNC: 10.6 MG/DL
CALCIUM SERPL-MCNC: 10.6 MG/DL
CHLORIDE SERPL-SCNC: 105 MMOL/L
CHOLEST SERPL-MCNC: 131 MG/DL
CO2 SERPL-SCNC: 25 MMOL/L
CREAT SERPL-MCNC: 1.03 MG/DL
EGFRCR SERPLBLD CKD-EPI 2021: 86 ML/MIN/1.73M2
ESTIMATED AVERAGE GLUCOSE: 123 MG/DL
GLUCOSE SERPL-MCNC: 91 MG/DL
HBA1C MFR BLD HPLC: 5.9 %
HCT VFR BLD CALC: 39 %
HDLC SERPL-MCNC: 67 MG/DL
HGB BLD-MCNC: 12.7 G/DL
LDH SERPL-CCNC: 128 U/L
LDLC SERPL-MCNC: 51 MG/DL
MAGNESIUM SERPL-MCNC: 2 MG/DL
MCHC RBC-ENTMCNC: 29 PG
MCHC RBC-ENTMCNC: 32.6 G/DL
MCV RBC AUTO: 89 FL
NONHDLC SERPL-MCNC: 64 MG/DL
PARATHYROID HORMONE INTACT: 169 PG/ML
PHOSPHATE SERPL-MCNC: 3 MG/DL
PLATELET # BLD AUTO: 150 K/UL
POTASSIUM SERPL-SCNC: 4.6 MMOL/L
PROT SERPL-MCNC: 6.1 G/DL
RBC # BLD: 4.38 M/UL
RBC # FLD: 13.2 %
SODIUM SERPL-SCNC: 141 MMOL/L
TACROLIMUS SERPL-MCNC: 6.8 NG/ML
TRIGL SERPL-MCNC: 56 MG/DL
URATE SERPL-MCNC: 5.8 MG/DL
WBC # FLD AUTO: 4.08 K/UL

## 2025-09-23 LAB
BKV DNA SPEC QL NAA+PROBE: NOT DETECTED IU/ML
CMV DNA SPEC QL NAA+PROBE: NOT DETECTED IU/ML
CMVPCR LOG: NOT DETECTED LOG10IU/ML

## (undated) DEVICE — DRSG XEROFORM 1 X 8"

## (undated) DEVICE — SOL IRR BAG NS 0.9% 3000ML

## (undated) DEVICE — POSITIONER FOAM EGG CRATE ULNAR 2PCS (PINK)

## (undated) DEVICE — PACK BASIC GOWN

## (undated) DEVICE — DRAPE 3/4 SHEET W REINFORCEMENT 56X77"

## (undated) DEVICE — DRSG COMBINE 5X9"

## (undated) DEVICE — WARMING BLANKET UPPER ADULT

## (undated) DEVICE — BLADE SCALPEL SAFETYLOCK #10

## (undated) DEVICE — STRYKER INTERPULSE HANDPIECE W IRR SUCTION TUBE

## (undated) DEVICE — POSITIONER FOAM HEADREST (PINK)

## (undated) DEVICE — SUT BOOT STANDARD (ASSORTED) 5 PAIR

## (undated) DEVICE — SUT POLYSORB 3-0 30" V-20 UNDYED

## (undated) DEVICE — MEDICATION LABELS W MARKER

## (undated) DEVICE — TAPE GLO-N-TELL RADIOPAQUE 20 STRIPS

## (undated) DEVICE — PACK GENERAL MINOR

## (undated) DEVICE — FOLEY TRAY 16FR 5CC LTX UMETER CLOSED

## (undated) DEVICE — DRAPE 1/2 SHEET 40X57"

## (undated) DEVICE — PACK CYSTO

## (undated) DEVICE — NDL PERC BASEPLT 18GX7CM

## (undated) DEVICE — LAP PAD 18 X 18"

## (undated) DEVICE — SUT POLYSORB 2-0 30" GS-21 UNDYED

## (undated) DEVICE — DRAPE TOWEL BLUE 17" X 24"

## (undated) DEVICE — DRSG KLING 4"

## (undated) DEVICE — SUT PROLENE 6-0 30" C-1

## (undated) DEVICE — GLV 6.5 PROTEXIS (WHITE)

## (undated) DEVICE — STAPLER SKIN VISI-STAT 35 WIDE

## (undated) DEVICE — DRAPE MAGNETIC INSTRUMENT MEDIUM

## (undated) DEVICE — SUT PDS II PLUS 4-0 27" SH

## (undated) DEVICE — SUT SOFSILK 2-0 18" TIES

## (undated) DEVICE — DRAPE INSTRUMENT POUCH 6.75" X 11"

## (undated) DEVICE — TUBING HIGH POWER CONTRAST INJ

## (undated) DEVICE — SOL IRR POUR H2O 250ML

## (undated) DEVICE — VENODYNE/SCD SLEEVE CALF LARGE

## (undated) DEVICE — BUR STRYKER OVAL SOLID CARBIDE MED 4MM

## (undated) DEVICE — NDL HYPO SAFE 25G X 1.5" (ORANGE)

## (undated) DEVICE — STEALTH CLAMP INSERT SOFT/SOFT 30MM

## (undated) DEVICE — SUT PROLENE 7-0 24" BV175-6

## (undated) DEVICE — NDL HYPO REGULAR BEVEL 25G X 1.5" (BLUE)

## (undated) DEVICE — PACK MAJOR ABDOMINAL SUPINE

## (undated) DEVICE — PREP BETADINE KIT

## (undated) DEVICE — BAG DECANTER DISP

## (undated) DEVICE — PREP CHLORAPREP HI-LITE ORANGE 26ML

## (undated) DEVICE — SAW BLADE MICROAIRE OSCILATING 25.4MM X 90MM X 1.27MM

## (undated) DEVICE — SUT SOFSILK 4-0 18" V-20

## (undated) DEVICE — SUT PLAIN GUT 4-0 18" P-12

## (undated) DEVICE — GLV 7.5 PROTEXIS (WHITE)

## (undated) DEVICE — SYR LUER LOK 10CC

## (undated) DEVICE — PACKING GAUZE PLAIN 2"

## (undated) DEVICE — DRAPE IOBAN 23" X 23"

## (undated) DEVICE — BLADE SCALPEL SAFETYLOCK #15

## (undated) DEVICE — STAPLER COVIDIEN ENDO GIA SHORT HANDLE

## (undated) DEVICE — DRSG STERISTRIPS 0.5 X 4"

## (undated) DEVICE — DRAPE ISOLATION BAG 20X20"

## (undated) DEVICE — SAW BLADE MICROAIRE SAGITTAL 5.8X25.4X0.6 MM

## (undated) DEVICE — SUT PROLENE 7-0 30" C-1

## (undated) DEVICE — WARMING BLANKET LOWER ADULT

## (undated) DEVICE — DRSG STOCKINETTE IMPERVIOUS XL

## (undated) DEVICE — DRSG ACE BANDAGE 6"

## (undated) DEVICE — DRAPE LIGHT HANDLE COVER (BLUE)

## (undated) DEVICE — SUT NYLON 2-0 18" FS

## (undated) DEVICE — DRSG OPSITE 13.75 X 4"

## (undated) DEVICE — MARKING PEN W RULER

## (undated) DEVICE — TORQUE DEVICE FOR GUIDEWIRE 0.0100.038"

## (undated) DEVICE — BAG URINE W METER 2L

## (undated) DEVICE — VISITEC 4X4

## (undated) DEVICE — GOWN XL EXTRA LONG

## (undated) DEVICE — DRAPE GENERAL ENDOSCOPY

## (undated) DEVICE — DRSG SURG OPSITE 10X4"

## (undated) DEVICE — Device

## (undated) DEVICE — PACKING GAUZE IODOFORM 2"

## (undated) DEVICE — SUCTION YANKAUER NO CONTROL VENT

## (undated) DEVICE — SOL IRR BAG H2O 3000ML

## (undated) DEVICE — SAW BLADE MICROAIRE SAGITTAL 9.4MMX25.4MMX0.6MM

## (undated) DEVICE — CONN DUAL HOSE

## (undated) DEVICE — DRAPE FEMORAL ANGIOGRAPHY W TROUGH

## (undated) DEVICE — NDL COUNTER FOAM AND MAGNET 40-70

## (undated) DEVICE — DRAPE SLUSH / WARMER 44 X 66"

## (undated) DEVICE — PACKING GAUZE PLAIN 0.5"

## (undated) DEVICE — AORTIC PUNCH 4.8 MM LONG HANDLE

## (undated) DEVICE — BLADE SCALPEL SAFETYLOCK #11

## (undated) DEVICE — DRSG TEGADERM 6"X8"

## (undated) DEVICE — DRAPE CAMERA VIDEO 7"X96"

## (undated) DEVICE — CLAMP ALLIGATOR (SINGLE JAW) 3FR X 65CM

## (undated) DEVICE — SPECIMEN CONTAINER 100ML

## (undated) DEVICE — PACK BASIN SPECIAL PROCEDURE

## (undated) DEVICE — PACK PERI GYN

## (undated) DEVICE — SUT PDS II 1 54" TP-1

## (undated) DEVICE — TUBING TRUWAVE PRESSURE MALE/FEMALE 72"

## (undated) DEVICE — PACK EXTREMITY

## (undated) DEVICE — FOLEY TRAY 16FR LF URINE METER SURESTEP

## (undated) DEVICE — NDL ENTRY PERC MCKNIGHT 18G

## (undated) DEVICE — GOWN XL

## (undated) DEVICE — SYR LUER LOK 20CC

## (undated) DEVICE — GLV 8 PROTEXIS (WHITE)

## (undated) DEVICE — ELCTR BOVIE TIP BLADE INSULATED 6.5" EDGE

## (undated) DEVICE — DRSG STOCKINETTE IMPERVIOUS MED

## (undated) DEVICE — GOWN TRIMAX LG

## (undated) DEVICE — DRAPE EQUIPMENT BANDED BAG 30 X 30" (SHOWER CAP)

## (undated) DEVICE — DRSG ADAPTIC CURITY OIL EMULSION 3 X 8"

## (undated) DEVICE — ELCTR BOVIE PENCIL HANDPIECE

## (undated) DEVICE — CATH IV SAFE BC 20G X 1.16" (PINK)

## (undated) DEVICE — SOL IRR POUR NS 0.9% 500ML

## (undated) DEVICE — SOL IRR POUR H2O 1500ML

## (undated) DEVICE — GLV 8 PROTEXIS (CREAM) NEU-THERA

## (undated) DEVICE — ELCTR BOVIE TIP BLADE INSULATED 2.75" EDGE

## (undated) DEVICE — SUT PDS II PLUS 5-0 30" RB-1

## (undated) DEVICE — AORTIC PUNCH 5MM STANDARD HANDLE

## (undated) DEVICE — DRAIN PENROSE .25" X 18" LATEX

## (undated) DEVICE — SUT SOFSILK 4-0 18" TIES

## (undated) DEVICE — SYR LUER LOK 50CC

## (undated) DEVICE — HEMOSTSASIS VALVE Y SEAL ACCESSPLUS LG BORE

## (undated) DEVICE — DRAPE MAYO STAND 30"

## (undated) DEVICE — GLV 7 PROTEXIS (WHITE)

## (undated) DEVICE — GOWN TRIMAX XXL

## (undated) DEVICE — DRAIN RESERVOIR FOR JACKSON PRATT 100CC CARDINAL

## (undated) DEVICE — SUT BIOSYN 4-0 18" P-12

## (undated) DEVICE — ACMI SELF-SEALING SEAL UP TO 7FR

## (undated) DEVICE — VESSEL LOOP MAXI-RED  0.120" X 16"

## (undated) DEVICE — SUT PROLENE 6-0 4-30" C-1

## (undated) DEVICE — INFLATION DEVICE BASIXCOMPAK